# Patient Record
Sex: FEMALE | Race: AMERICAN INDIAN OR ALASKA NATIVE | NOT HISPANIC OR LATINO | Employment: OTHER | URBAN - METROPOLITAN AREA
[De-identification: names, ages, dates, MRNs, and addresses within clinical notes are randomized per-mention and may not be internally consistent; named-entity substitution may affect disease eponyms.]

---

## 2017-01-06 NOTE — PROGRESS NOTES
SUBJECTIVE:                                                    Kim Anne is a 71 year old female who presents to clinic today for the following health issues:    MTM: Crystal Barth, PharmD    Patient Description: Obese Native-American female with glasses.      Diabetes Follow-up  A1C goal: <8    On insulin.    A1C      6.3   12/13/2016  A1C      7.2   10/11/2016  A1C      9.1   8/29/2016  A1C      7.2   6/30/2016  A1C      6.8   12/9/2015  TSH   Date Value Ref Range Status   11/03/2016 1.28 0.40 - 4.00 mU/L Final   ]      Patient is checking blood sugars: Once in a while, 105 (fasting) today. In the past 2 weeks, reports lowest reading of 79 (asymptomatic, reports she rarely has low readings) and highest reading of 268 (after drinking soda).    Diabetic concerns: None     Symptoms of hypoglycemia (low blood sugar): none     Paresthesias (numbness or burning in feet) or sores: Yes, burning in the evening      Diabetic eye exam within the last year: Yes     Hyperlipidemia Follow-Up  LDL goal: <100    LDL CHOLESTEROL CALCULATED   Date Value Ref Range Status   12/20/2013 109 0 - 129 mg/dL Final     Comment:     LDL Cholesterol is the primary guide to therapy: LDL-cholesterol goal in high   risk patients is <100 mg/dL and in very high risk patients is <70 mg/dL.     LDL CHOLESTEROL DIRECT   Date Value Ref Range Status   08/29/2016 85 <100 mg/dL Final     Comment:     Desirable:       <100 mg/dl   ]      Rate your low fat/cholesterol diet?: good    Taking statin?  Yes, no muscle aches from statin    Other lipid medications/supplements?:  none     Hypertension Follow-up  BP goal: <130/80    BP Readings from Last 3 Encounters:   01/11/17 191/77   12/12/16 140/90   12/02/16 153/78     Last Basic Metabolic Panel:  NA      144   11/4/2016   POTASSIUM      4.5   11/4/2016  CHLORIDE      115   11/4/2016  FRANCES      7.9   11/4/2016  CO2       21   11/4/2016  BUN       37   11/4/2016  CR     2.09   11/4/2016  GLC       88    Visit Information Date & Time Provider Department Dept. Phone Encounter #  
 1/6/2017 10:15 AM Aracelis Redd MD [facility] [address] [place] [address] [id number] Follow-up Instructions Return in about 3 months (around 4/6/2017) for NP. Upcoming Health Maintenance Date Due HEMOGLOBIN A1C Q6M 1942 LIPID PANEL Q1 1942 FOOT EXAM Q1 12/30/1952 MICROALBUMIN Q1 12/30/1952 EYE EXAM RETINAL OR DILATED Q1 12/30/1952 DTaP/Tdap/Td series (1 - Tdap) 12/30/1963 FOBT Q 1 YEAR AGE 50-75 12/30/1992 ZOSTER VACCINE AGE 60> 12/30/2002 GLAUCOMA SCREENING Q2Y 12/30/2007 Pneumococcal 65+ Low/Medium Risk (1 of 2 - PCV13) 12/30/2007 MEDICARE YEARLY EXAM 12/30/2007 INFLUENZA AGE 9 TO ADULT 8/1/2016 Allergies as of 1/6/2017  Review Complete On: 1/6/2017 By: Jason Crawford LPN No Known Allergies Current Immunizations  Never Reviewed No immunizations on file. Not reviewed this visit You Were Diagnosed With   
  
 Codes Comments Bile duct stricture    -  Primary ICD-10-CM: K83.1 ICD-9-CM: 576.2 Intrahepatic bile duct carcinoma ([address]     ICD-10-CM: C22.1 ICD-9-CM: 155.1 Other abnormal tumor markers     ICD-10-CM: R97.8 ICD-9-CM: 795.89 Vitals BP Pulse Temp Height(growth percentile) Weight(growth percentile) SpO2  
 118/53 67 97.2 °F (36.2 °C) (Oral) 6' 2\" (1.88 m) 290 lb (131.5 kg) 93% BMI Smoking Status 37.23 kg/m2 Former Smoker BMI and BSA Data Body Mass Index Body Surface Area  
 37.23 kg/m 2 2.62 m 2 Preferred Pharmacy Pharmacy Name Phone [id number] [phone] Your Updated Medication List  
  
   
This list is accurate as of: 1/6/17 10:30 AM.  Always use your most recent med list.  
  
  
  
  
 ASPIRIN PO Take 81 mg by mouth daily. "11/4/2016      Outpatient blood pressures are being checked occasionally. She last checked BP at home 2 days ago, reports systolic BP of 198.    Low Salt Diet: no added salt       Chronic Kidney Disease Follow-up    Current NSAID use?   Ketorolac Tromethamine     Last seen by Nephrology 3/11/2016    GFR ESTIMATE   Date Value Ref Range Status   11/04/2016 23* >60 mL/min/1.7m2 Final     Comment:     Non  GFR Calc   11/03/2016 20* >60 mL/min/1.7m2 Final     Comment:     Non  GFR Calc   11/02/2016 17* >60 mL/min/1.7m2 Final     Comment:     Non  GFR Calc     GFR ESTIMATE IF BLACK   Date Value Ref Range Status   11/04/2016 28* >60 mL/min/1.7m2 Final     Comment:      GFR Calc   11/03/2016 24* >60 mL/min/1.7m2 Final     Comment:      GFR Calc   11/02/2016 20* >60 mL/min/1.7m2 Final     Comment:      GFR Calc       Depression Follow-up    Status since last visit:  Not a current problem     See PHQ-9 for current symptoms.  Other associated symptoms: None    Complicating factors:   Significant life event:  No   Current substance abuse:  None  Anxiety or Panic symptoms:  No    MENTAL STATUS EXAM  Appearance: Appears stated age, dressed and groomed appropriately for the visit today.  Attitude: cooperative  Behavior: normal  Eye Contact: normal  Speech: normal  Orientation: oriented to person, place, time and situation  Mood:  admits sadness and anxiety most of time  Affect: Mood Congruent  Thought Process: clear  Suicidal Ideation: reports no thoughts, no intention  Hallucination: no  Paranoid-no  Manic-no  Panic-no  Self harm-no  OCD-no  Gambling-no  Excessive shopping-no    Pain: 4-6/10, back and legs. No new MS complaints.    Sleep - \"Good\", wakes to urinate typically 3-4 times per night, after urinating she is able to go back to sleep without difficulty. Reports 6-8 hours total sleep per night.  Appetite - \"Good\", fasting " benazepril 10 mg tablet Commonly known as:  LOTENSIN Take 10 mg by mouth daily. bumetanide 1 mg tablet Commonly known as:  Rosie Bolus Take 2 mg by mouth two (2) times a day. CULTURELLE PROBIOTICS PO Take 1 Cap by mouth daily. * levothyroxine 200 mcg tablet Commonly known as:  SYNTHROID Take 400 mcg by mouth two (2) days a week. Takes 400 mcg Sat, Sun  
  
 * levothyroxine 200 mcg tablet Commonly known as:  SYNTHROID Take 200 mcg by mouth five (5) days a week. Daily Mon through Fri LORazepam 1 mg tablet Commonly known as:  ATIVAN Take 1 mg by mouth nightly.  
  
 magnesium oxide 400 mg Cap Take 400 mg by mouth daily. * NovoLOG Mix 70-30 FlexPen 100 unit/mL (70-30) Inpn Generic drug:  insulin aspart protamine/insulin aspart 60 Units by SubCUTAneous route Before breakfast and dinner. * NovoLOG Mix 70-30 FlexPen 100 unit/mL (70-30) Inpn Generic drug:  insulin aspart protamine/insulin aspart 10 Units by SubCUTAneous route nightly as needed (if BS >190 takes 10 units). potassium 99 mg tablet Take 99 mg by mouth daily. sucralfate 1 gram tablet Commonly known as:  Danney Ken Take 1 Tab by mouth three (3) times daily. VITAMIN B-12 1,000 mcg tablet Generic drug:  cyanocobalamin Take 1,000 mcg by mouth daily. VITAMIN D3 PO Take 10,000 Units by mouth daily. * Notice: This list has 4 medication(s) that are the same as other medications prescribed for you. Read the directions carefully, and ask your doctor or other care provider to review them with you. We Performed the Following CANCER AG 19-9 [[id number] Custom] CEA [id number] CPT(R)] HEPATIC FUNCTION PANEL [10281 CPT(R)] METABOLIC PANEL, COMPREHENSIVE [40170 CPT(R)] Follow-up Instructions Return in about 3 months (around 4/6/2017) for NP. Introducing [facility]! Dear Maria Feliciano: Thank you for requesting a [employer] account. Our records indicate that you already have an active [employer] account. You can access your account anytime at [email or web address] Did you know that you can access your hospital and ER discharge instructions at any time in [employer]? You can also review all of your test results from your hospital stay or ER visit. Additional Information If you have questions, please visit the Frequently Asked Questions section of the [employer] website at [email or web address] Remember, [employer] is NOT to be used for urgent needs. For medical emergencies, dial 911. Now available from your iPhone and Android! Please provide this summary of care documentation to your next provider. Your primary care clinician is listed as Azeem Sanchez. If you have any questions after today's visit, please call [phone]. today.  Exercise - 15 stairs in home     Recent falls - no  Recent blackouts - no  Seizures - no    Smoking - 2-3 cigarettes per day  Alcohol - None  Street drugs - None  Marijuana - None  Caffeine - Occasional soda    Any ER/UC or hospital visits in the last 2 weeks? - no      PHQ-9  English PHQ-9   Any Language             Social History   Substance Use Topics     Smoking status: Former Smoker -- 0.50 packs/day for 40 years     Types: Cigarettes     Quit date: 10/31/2016     Smokeless tobacco: Never Used     Alcohol Use: No        Problem list and histories reviewed & adjusted, as indicated.  Additional history: as documented    Patient Active Problem List   Diagnosis     Hyperlipidemia LDL goal <100     ARAUZ (dyspnea on exertion)     COPD (chronic obstructive pulmonary disease) (H)     Edema     Fatigue     History of MI (myocardial infarction)     Snores     Knee pain, left     Bunion of great toe of left foot     Dystrophic nail     Arthritis     Peripheral vascular disease (H)     Chronic low back pain     Health Care Home     CKD (chronic kidney disease) stage 4, GFR 15-29 ml/min (H)     Abnormal gait     Advance Care Planning     Vitamin D deficiency     Constipation     Tobacco use disorder     Hypertension goal BP (blood pressure) < 130/80     Bradycardia     Callus of foot     Other chronic pain     Cataracts, bilateral     Hyperopic astigmatism of both eyes     Presbyopia     Asymptomatic bunion, right     Acquired hypothyroidism     Type 2 diabetes mellitus with diabetic chronic kidney disease (H)     Hypertension associated with diabetes (H)     Hyperlipidemia associated with type 2 diabetes mellitus (H)     Obesity (BMI 30-39.9)     History of sick sinus syndrome     Nephrotic syndrome     Pneumonia     Grief     Past Surgical History   Procedure Laterality Date     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     Appendectomy       Cholecystectomy       Hysteroscopic placement contraceptive device        Elected term pregnancy       Subclavian stent  2010     [facility]     Ovary surgery       left for cyst benign     Colonoscopy  7/15/2011     polyps repeat in 5 years     Blepharoplasty bilateral  2013     Procedure: BLEPHAROPLASTY BILATERAL;  BILATERAL UPPER EYELID BLEPHAROPLASTY ;  Surgeon: Olayinka Lyon MD;  Location: [facility]       Social History   Substance Use Topics     Smoking status: Former Smoker -- 0.50 packs/day for 40 years     Types: Cigarettes     Quit date: 10/31/2016     Smokeless tobacco: Never Used     Alcohol Use: No     Family History   Problem Relation Age of Onset     DIABETES Mother      brother, MGM, sister     KIDNEY DISEASE Brother      X2 DM two      Asthma No family hx of      C.A.D. No family hx of      Hypertension No family hx of      CEREBROVASCULAR DISEASE No family hx of      Breast Cancer No family hx of      Cancer - colorectal No family hx of      Prostate Cancer No family hx of      Allergies No family hx of      Alzheimer Disease No family hx of      Anesthesia Reaction No family hx of      Arthritis No family hx of      Blood Disease No family hx of      CANCER No family hx of      Cardiovascular No family hx of      Circulatory No family hx of      Congenital Anomalies No family hx of      Connective Tissue Disorder No family hx of      Depression No family hx of      Eye Disorder No family hx of      Genetic Disorder No family hx of      GASTROINTESTINAL DISEASE No family hx of      Genitourinary Problems No family hx of      Gynecology No family hx of      HEART DISEASE No family hx of      Lipids No family hx of      Musculoskeletal Disorder No family hx of      Neurologic Disorder No family hx of      Obesity No family hx of      OSTEOPOROSIS No family hx of      Psychotic Disorder No family hx of      Respiratory No family hx of      Thyroid Disease No family hx of      Glaucoma No family hx of      Macular Degeneration No family hx of       Alcohol/Drug Child      daughter         Current Outpatient Prescriptions   Medication Sig Dispense Refill     oxyCODONE (ROXICODONE) 10 MG IR tablet Take 1 tablet (10 mg) by mouth every 8 hours as needed 90 tablet 0     glucose-vitamin C (DEX4 GLUCOSE) 4-0.006 G CHEW Take 1 tablet (4 g) by mouth every hour as needed for low blood sugar 50 tablet 3     insulin glargine (LANTUS) 100 UNIT/ML injection Inject 40 Units Subcutaneous every morning Pt reports down to 42 per MTM ON 12/08/2016 1 Month 3     losartan (COZAAR) 100 MG tablet Take 1 tablet (100 mg) by mouth daily 90 tablet 0     metoprolol (TOPROL-XL) 25 MG 24 hr tablet Take 1 tablet (25 mg) by mouth daily 90 tablet 1     albuterol (PROAIR HFA/PROVENTIL HFA/VENTOLIN HFA) 108 (90 BASE) MCG/ACT Inhaler Inhale 2 puffs into the lungs every 6 hours as needed for shortness of breath / dyspnea or wheezing 18 g 3     budesonide-formoterol (SYMBICORT) 80-4.5 MCG/ACT Inhaler Inhale 2 puffs into the lungs 2 times daily 1 Inhaler 1     nystatin (MYCOSTATIN) 339513 UNIT/GM POWD Apply 1 g topically 3 times daily as needed 30 g 1     amLODIPine (NORVASC) 10 MG tablet Take 1 tablet (10 mg) by mouth daily 30 tablet 11     furosemide (LASIX) 20 MG tablet Take 1 tablet (20 mg) by mouth daily 60 tablet 11     Cholecalciferol (VITAMIN D) 2000 UNITS tablet Take 2,000 Units by mouth daily 60 tablet 2     Alcohol Swabs (SM ALCOHOL PREP) 70 % PADS        ketorolac (ACULAR) 0.5 % ophthalmic solution        levothyroxine (SYNTHROID, LEVOTHROID) 200 MCG tablet Take 1 tablet (200 mcg) by mouth See Admin Instructions New daily increase. Recheck labs in 8 weeks. 90 tablet 1     docusate sodium (COLACE) 100 MG capsule Take 1 capsule (100 mg) by mouth 2 times daily 60 capsule 4     insulin pen needle 31G X 6 MM Use as directed 120 each 3     ASPIRIN LOW DOSE 81 MG EC tablet Take 1 tablet (81 mg) by mouth daily 30 tablet 11     Isopropyl Alcohol ([facility] ISOPROPYL ALCOHOL WIPES) 70 % MISC Externally  apply 1 pad topically 3 times daily 100 each 11     Propylene Glycol (SYSTANE BALANCE OP) Apply to eye as needed       order for DME Disp Refills Start End AMADEO     ORDER FOR DME, SET TO LOCAL PRINT, 1 Device 0   --    Sig: Equipment being ordered: DM shoes and inserts   Bunion, callus    Class: Local Print         Medication Administration Instructions     Equipment being ordered: DM shoes and inserts  Bunion, callus 1 Device 0     atorvastatin (LIPITOR) 40 MG tablet Take 1 tablet (40 mg) by mouth daily 90 tablet 1     ferrous sulfate (IRON) 325 (65 FE) MG tablet Take 1 tablet (325 mg) by mouth daily (with breakfast) 100 tablet 1     blood glucose monitoring (FREESTYLE) lancets Test BS four times daily as directed 1 Box 12     order for DME Equipment being ordered: 2 pairs of moderate support knee high support hose 2 Device 0     blood glucose monitoring (NO BRAND SPECIFIED) test strip 1 strip by In Vitro route 2 times daily Freestyle Lite 2 Box 12     order for DME Equipment being ordered:  4 wheeled walker with basket, seat and hand brakes 1 Device 0     order for DME Equipment being ordered: one automated BP machine and large cuff 1 Device 0     order for DME Equipment being ordered: 2 pair moderate level knee high support hose 2 Device 1     order for DME Equipment being ordered: One wheeled walker with seat, brakes and basket  To replace lost walker 1 Device 0     polyethylene glycol (MIRALAX) powder Take 17 g by mouth daily 510 g 2     nitroglycerin (NITROSTAT) 0.4 MG SL tablet Place 1 tablet (0.4 mg) under the tongue every 5 minutes as needed for chest pain 25 tablet 0     ORDER FOR DME, SET TO LOCAL PRINT, Equipment being ordered: DM shoes and inserts  Bunion, callus 1 Device 0     Emollient (EUCERIN CALMING DAILY MOIST) CREA Externally apply 1 dose * topically daily 1 Tube 12       Medications Reviewed with Patient:     Patient is a poor historian as it pertains to her medication compliance.    - Lantus:  "Reports she is now taking in evening rather than AM.  - Metoprolol, Amlodipine, and Losartan: Last took 2 days ago.  - Oxycodone: Last took last night.   - Nitroglycerin: States she has not had to take since previous visits.  - Lasix: Reports she is taking as prescribed.  - Levothyroxine: Reports she is taking as prescribed.  - Baby aspirin: Reports she is taking as prescribed.  - Atorvastatin: Reports she is taking as prescribed.  - Iron supplement: Reports she is taking as prescribed.  - Albuterol and Symbicort inhalers: Reports she is taking as prescribed, but using the \"blue one\" TID.    Discussed medication non-compliance. Reports she sets up her medications in a pill box weekly, but often does not take her medications because she has been \"too busy\".       [place] Prescription Drug Monitoring Program: Completed by provider 1/10/2017    Oxycodone (ROXICODONE) 10 mg tablet: On 12/9/2016, patient filled #90 (30 day supply)      Allergies   Allergen Reactions     Contrast Dye Hives and Itching     Clonidine      She had as IP and thinks it made her itchy     Diatrizoate Other (See Comments)     Diltiazem      Severe bradycardia     Hydralazine      [place] tab patient thought made her itchy so stopped     Iodine-131      Recent Labs   Lab Test  12/13/16   1036  11/04/16   0621  11/03/16   0545   11/02/16   0622  11/01/16   2335  10/11/16   0842  08/29/16   1652   12/09/15   0946   03/04/15   0944   12/20/13   1022   10/19/11   1111  08/19/11   0945   A1C  6.3*   --    --    --    --    --   7.2*  9.1*   < >  6.8*   < >  6.9*   < >  6.7*   < >   --   6.9*   LDL   --    --    --    --    --    --    --   85   --   72   --   99   < >  109   < >  85  109   HDL   --    --    --    --    --    --    --    --    --    --    --    --    --   43*   --   49*  49*   TRIG   --    --    --    --    --    --    --    --    --    --    --    --    --   331*   --   175*  254*   ALT   --    --    --    --   21 27   --   23   --   27   " "< >   --    < >   --    < >  14   --    CR   --   2.09*  2.39*   < >  2.64*  2.53*  2.24*  2.17*   < >  1.75*   < >  1.59*   < >   --    < >   --   1.20*   GFRESTIMATED   --   23*  20*   < >  18*  19*  22*  22*   < >  29*   < >  32*   < >   --    < >   --   45*   GFRESTBLACK   --   28*  24*   < >  22*  23*  26*  27*   < >  35*   < >  39*   < >   --    < >   --   55*   POTASSIUM   --   4.5  4.5   < >  5.6*  5.3  5.5*  5.2   < >  4.9   < >  5.1   < >   --    < >   --   4.2   TSH   --    --   1.28   --    --    --   17.25*  13.11*   --   1.24   < >   --    < >   --    < >  6.82*  36.00*    < > = values in this interval not displayed.      BP Readings from Last 3 Encounters:   01/11/17 191/77   12/12/16 140/90   12/02/16 153/78    Wt Readings from Last 3 Encounters:   01/11/17 196 lb 8 oz (89.132 kg)   12/08/16 197 lb 8 oz (89.585 kg)   12/02/16 191 lb (86.637 kg)            Labs reviewed in EPIC  Problem list, Medication list, Allergies, and Medical/Social/Surgical histories reviewed in [facility] and updated as appropriate.    ROS: Ankle swelling, worse at end of the day, wears support hose and tries to limit salt intake.  10 point ROS neg other than the symptoms noted above in the HPI.      This document serves as a record of the services and decisions personally performed and made by Mai Babcock MD. It was created on her behalf by Lou Pedro, a trained medical scribe. The creation of this document is based on the provider's statements to the medical scribe.  Lou Pedro 9:24 AM January 11, 2017    OBJECTIVE:                                                    /77 mmHg  Pulse 69  Temp(Src) 98.7  F (37.1  C) (Oral)  Resp 16  Ht 5' 5\" (1.651 m)  Wt 196 lb 8 oz (89.132 kg)  BMI 32.70 kg/m2  SpO2 96%  Body mass index is 32.7 kg/(m^2).     GENERAL:  Native-American obese female, healthy, alert and no distress  EYES: Eyes grossly normal to inspection, extraocular movements - intact, and PERRL  HENT: ear " canals- normal; TMs- normal; Nose- normal; Mouth- no ulcers, no lesions  NECK: no tenderness, no adenopathy, no asymmetry, no masses, no stiffness; thyroid- normal to palpation  RESP: lungs clear to auscultation - no rales, no rhonchi, no wheezes  CV: regular rates and rhythm, normal S1 S2, no S3 or S4 and no murmur, no click or rub -no edema no casimiro/cords  ABDOMEN: soft, no tenderness, no  hepatosplenomegaly, no masses, normal bowel sounds  MS: extremities- no gross deformities noted, trace edema of lower extremities bilaterally-symmetrical  SKIN: no suspicious lesions, no rashes  NEURO: strength and tone- normal, sensory exam- grossly normal, mentation- intact, speech- normal, reflexes- symmetric  Non focal no aphasia. No facial asymmetry. Finger to nose, rapid alteration, finger thumb opposition, heel knee shin are normal.  Diabetic foot exam: normal DP and PT pulses, left bunion, no trophic changes or ulcerative lesions and normal sensory exam, monofilament   BACK: no CVA tenderness, no paralumbar tenderness  PSYCH: Alert and oriented times 3; speech- coherent , normal rate and volume; able to articulate logical thoughts, able to abstract reason, no tangential thoughts, no hallucinations or delusions, affect- normal; poor historian in relationship to medication compliance  See [facility] notes   LYMPHATICS: ant. cervical- normal, post. cervical- normal, axillary- normal, supraclavicular- normal, inguinal- normal    Results for orders placed or performed in visit on 01/11/17   Drug abuse screen (NL, RW)   Result Value Ref Range    Methamphetamine Qual Urine  NEG     Negative   Cutoff for a negative methamphetamine is 1000 ng/mL or less.      Cocaine Qual Urine  NEG     Negative   Cutoff for a negative cocaine is 300 ng/mL or less.      Cannabinoids Qual Urine  NEG     Negative   Cutoff for a negative cannabinoid is 50 ng/mL or less.      MDMA Qual Urine  NEG     Negative   Cutoff for a negative MDMA (ecstasy) is 500  ng/mL or less.      Methadone Qual Urine  NEG     Negative   Cutoff for a negative methadone is 300 ng/mL or less.      Opiates Qualitative Urine  NEG     Negative   Cutoff for a negative opiate is 300 ng/mL or less.      Benzodiazepine Qual Urine  NEG     Negative   Cutoff for a negative benzodiazepine is 300 ng/mL or less.      Tricyc Anti Qual Urine  NEG     Negative   Cutoff for a negative tricyclic antidepressant is 1000 ng/mL or less.      Barbiturates Qual Urine  NEG     Negative   Cutoff for a negative barbituate is 300 ng/mL or less.      PCP Qual Urine  NEG     Negative   Cutoff for a negative PCP is 25 ng/mL or less.      Amphetamine Qual Urine  NEG     Negative   Cutoff for a negative amphetamine is 1000 ng/mL or less.      Oxycodone Qual Urine (A) NEG     Positive   Cutoff for a positive Oxycodone is greater than 100 ng/mL. This is an   unconfirmed screening result to be used for medical purposes only.                ASSESSMENT/PLAN:                                                      (E11.22,  N18.4,  Z79.4) Type 2 diabetes mellitus with stage 4 chronic kidney disease, with long-term current use of insulin (H)  (primary encounter diagnosis)  Comment: A1c has been recently controlled.  A1C      6.3   12/13/2016  A1C      7.2   10/11/2016  A1C      9.1   8/29/2016  A1C      7.2   6/30/2016  A1C      6.8   12/9/2015  Plan: Continue current treatment plan. Nephrology appointment 1/27/2017.    (M54.5,  G89.29) Chronic low back pain without sciatica, unspecified back pain laterality  Comment: Negative drug abuse screen. Refill.  Plan: Drug abuse screen (NL, RW), oxyCODONE         (ROXICODONE) 10 MG IR tablet    (Z79.899) Controlled substance agreement signed  Comment: Signed today.  Plan: CSA will be on file.    (E78.5) Hyperlipidemia LDL goal <100  Comment:   LDL CHOLESTEROL CALCULATED   Date Value Ref Range Status   12/20/2013 109 0 - 129 mg/dL Final     Comment:     LDL Cholesterol is the primary guide  to therapy: LDL-cholesterol goal in high   risk patients is <100 mg/dL and in very high risk patients is <70 mg/dL.     LDL CHOLESTEROL DIRECT   Date Value Ref Range Status   08/29/2016 85 <100 mg/dL Final     Comment:     Desirable:       <100 mg/dl     Plan: Continue current treatment plan.    (I10) Hypertension goal BP (blood pressure) < 130/80  Comment: Elevated secondary to medication noncompliance. Discussed importance of compliance to lower risk for stroke. Will check renal function.  BP Readings from Last 6 Encounters:   01/11/17 160/68   12/12/16 140/90   12/02/16 153/78   11/11/16 198/80   11/04/16 119/62   10/11/16 134/77     Plan: Renal panel (Alb, BUN, Ca, Cl, CO2, Creat,         Gluc, Phos, K, Na)    (K59.00) Constipation, unspecified constipation type  Comment: Managed with medication. Refill.  Plan: docusate sodium (COLACE) 100 MG capsule    (N18.4) CKD (chronic kidney disease) stage 4, GFR 15-29 ml/min (H)  Comment:   GFR ESTIMATE   Date Value Ref Range Status   11/04/2016 23* >60 mL/min/1.7m2 Final     Comment:     Non  GFR Calc   11/03/2016 20* >60 mL/min/1.7m2 Final     Comment:     Non  GFR Calc   11/02/2016 17* >60 mL/min/1.7m2 Final     Comment:     Non  GFR Calc     GFR ESTIMATE IF BLACK   Date Value Ref Range Status   11/04/2016 28* >60 mL/min/1.7m2 Final     Comment:      GFR Calc   11/03/2016 24* >60 mL/min/1.7m2 Final     Comment:      GFR Calc   11/02/2016 20* >60 mL/min/1.7m2 Final     Comment:      GFR Calc     Plan: Renal panel (Alb, BUN, Ca, Cl, CO2, Creat,         Gluc, Phos, K, Na) - Appointment with Nephrology 1/27/2017.    (J44.9) Chronic obstructive pulmonary disease, unspecified COPD type (H)  Comment: Oxygen saturation stable.  SpO2 Readings from Last 12 Encounters:   01/11/17 96%   12/08/16 97%   12/02/16 97%   11/11/16 97%   11/04/16 97%   10/11/16 97%   08/29/16 98%   08/10/16  "97%   06/30/16 96%   06/02/16 96%   05/26/16 97%   03/21/16 96%     Plan: Continue current treatment plan.    (D50.9) Iron deficiency anemia, unspecified iron deficiency anemia type  Comment: On iron supplementation. Labs today.  Plan: CBC with platelets, Iron and iron binding         capacity, Ferritin    (Z91.14) Noncompliance with medication regimen  Comment: Patient reports difficulty taking medications secondary to being \"too busy\". Discussed importance of medication compliance.  Plan: See patient instructions. Patient is to bring all medications in with her at next visit.    (F17.200) Tobacco use disorder  Comment: 2-3 cigarettes per day.  Plan: Encouraged cessation.        Patient Instructions   - Labs today.    - Remember to take your blood pressure medications (amlodipine, losartan, metoprolol, lasix) every morning, this will help reduce your risk for stroke.    - Make appointment with Crystal Barth PharmD for next week.    - Make appointment with Dr. Babcock in 1 month.    - Hard copy of script for oxycodone will be given to patient if tox screen is OK.     - Patient signed controlled substance agreement today.            The information in this document, created by a scribe for me, accurately reflects the services I personally performed and the decisions made by me. I have reviewed and approved this document for accuracy.  12:08 PM January 11, 2017    Mai Babcock MD  [facility]    40 min spent in direct face to face time with this pt discussing medication compliance, uncontrolled hypertension, CKD stage IV, diabetes, mental health, and others described above, greater than 50% in counseling and coordination of care.      "

## 2017-01-09 ENCOUNTER — CARE COORDINATION (OUTPATIENT)
Dept: GERIATRIC MEDICINE | Facility: CLINIC | Age: 72
End: 2017-01-09

## 2017-01-09 NOTE — PROGRESS NOTES
Per member, arranged transportation thru [facility] for the below appt:  Appt Date: 1/11 at 9:30am  Clinic Name:  [facility], [address], [place]  Transportation Provider: [employer]   time:  8:30-9am    Notified member of  time.  Shakila Fountain  Case Management Specialist  [facility]  [phone]

## 2017-01-11 ENCOUNTER — OFFICE VISIT (OUTPATIENT)
Dept: FAMILY MEDICINE | Facility: CLINIC | Age: 72
End: 2017-01-11
Payer: MEDICARE

## 2017-01-11 ENCOUNTER — OFFICE VISIT (OUTPATIENT)
Dept: PHARMACY | Facility: CLINIC | Age: 72
End: 2017-01-11
Payer: COMMERCIAL

## 2017-01-11 VITALS
RESPIRATION RATE: 16 BRPM | OXYGEN SATURATION: 96 % | SYSTOLIC BLOOD PRESSURE: 160 MMHG | WEIGHT: 196.5 LBS | HEART RATE: 69 BPM | DIASTOLIC BLOOD PRESSURE: 68 MMHG | HEIGHT: 65 IN | TEMPERATURE: 98.7 F | BODY MASS INDEX: 32.74 KG/M2

## 2017-01-11 DIAGNOSIS — N18.4 CKD (CHRONIC KIDNEY DISEASE) STAGE 4, GFR 15-29 ML/MIN (H): ICD-10-CM

## 2017-01-11 DIAGNOSIS — Z79.899 CONTROLLED SUBSTANCE AGREEMENT SIGNED: ICD-10-CM

## 2017-01-11 DIAGNOSIS — J44.9 CHRONIC OBSTRUCTIVE PULMONARY DISEASE, UNSPECIFIED COPD TYPE (H): ICD-10-CM

## 2017-01-11 DIAGNOSIS — F17.200 TOBACCO USE DISORDER: ICD-10-CM

## 2017-01-11 DIAGNOSIS — E11.22 TYPE 2 DIABETES MELLITUS WITH STAGE 4 CHRONIC KIDNEY DISEASE, WITH LONG-TERM CURRENT USE OF INSULIN (H): Primary | ICD-10-CM

## 2017-01-11 DIAGNOSIS — I10 HYPERTENSION GOAL BP (BLOOD PRESSURE) < 130/80: ICD-10-CM

## 2017-01-11 DIAGNOSIS — N18.4 TYPE 2 DIABETES MELLITUS WITH STAGE 4 CHRONIC KIDNEY DISEASE, WITH LONG-TERM CURRENT USE OF INSULIN (H): Primary | ICD-10-CM

## 2017-01-11 DIAGNOSIS — Z91.148 NONCOMPLIANCE WITH MEDICATION REGIMEN: ICD-10-CM

## 2017-01-11 DIAGNOSIS — I15.2 HYPERTENSION ASSOCIATED WITH DIABETES (H): Primary | ICD-10-CM

## 2017-01-11 DIAGNOSIS — K59.00 CONSTIPATION, UNSPECIFIED CONSTIPATION TYPE: ICD-10-CM

## 2017-01-11 DIAGNOSIS — Z79.4 TYPE 2 DIABETES MELLITUS WITH STAGE 4 CHRONIC KIDNEY DISEASE, WITH LONG-TERM CURRENT USE OF INSULIN (H): Primary | ICD-10-CM

## 2017-01-11 DIAGNOSIS — M54.50 CHRONIC LOW BACK PAIN WITHOUT SCIATICA, UNSPECIFIED BACK PAIN LATERALITY: ICD-10-CM

## 2017-01-11 DIAGNOSIS — E78.5 HYPERLIPIDEMIA LDL GOAL <100: ICD-10-CM

## 2017-01-11 DIAGNOSIS — D50.9 IRON DEFICIENCY ANEMIA, UNSPECIFIED IRON DEFICIENCY ANEMIA TYPE: ICD-10-CM

## 2017-01-11 DIAGNOSIS — G89.29 CHRONIC LOW BACK PAIN WITHOUT SCIATICA, UNSPECIFIED BACK PAIN LATERALITY: ICD-10-CM

## 2017-01-11 DIAGNOSIS — E11.59 HYPERTENSION ASSOCIATED WITH DIABETES (H): Primary | ICD-10-CM

## 2017-01-11 LAB
ALBUMIN SERPL-MCNC: 1.9 G/DL (ref 3.4–5)
AMPHETAMINES UR QL: ABNORMAL
ANION GAP SERPL CALCULATED.3IONS-SCNC: 9 MMOL/L (ref 3–14)
BARBITURATES UR QL: ABNORMAL
BENZODIAZ UR QL: ABNORMAL
BUN SERPL-MCNC: 22 MG/DL (ref 7–30)
CALCIUM SERPL-MCNC: 8.5 MG/DL (ref 8.5–10.1)
CANNABINOIDS UR QL: ABNORMAL
CHLORIDE SERPL-SCNC: 116 MMOL/L (ref 94–109)
CO2 SERPL-SCNC: 20 MMOL/L (ref 20–32)
COCAINE UR QL: ABNORMAL
CREAT SERPL-MCNC: 2.2 MG/DL (ref 0.52–1.04)
ERYTHROCYTE [DISTWIDTH] IN BLOOD BY AUTOMATED COUNT: 16.6 % (ref 10–15)
FERRITIN SERPL-MCNC: 105 NG/ML (ref 8–252)
GFR SERPL CREATININE-BSD FRML MDRD: 22 ML/MIN/1.7M2
GLUCOSE SERPL-MCNC: 145 MG/DL (ref 70–99)
HCT VFR BLD AUTO: 41.8 % (ref 35–47)
HGB BLD-MCNC: 13.2 G/DL (ref 11.7–15.7)
IRON SATN MFR SERPL: 18 % (ref 15–46)
IRON SERPL-MCNC: 35 UG/DL (ref 35–180)
MCH RBC QN AUTO: 29.4 PG (ref 26.5–33)
MCHC RBC AUTO-ENTMCNC: 31.6 G/DL (ref 31.5–36.5)
MCV RBC AUTO: 93 FL (ref 78–100)
MDA UR QL SCN: ABNORMAL
METHADONE UR QL SCN: ABNORMAL
METHAMPHET UR QL SCN: ABNORMAL
OPIATES UR QL SCN: ABNORMAL
OXYCODONE UR QL: ABNORMAL
PCP UR QL SCN: ABNORMAL
PHOSPHATE SERPL-MCNC: 3.8 MG/DL (ref 2.5–4.5)
PLATELET # BLD AUTO: 266 10E9/L (ref 150–450)
POTASSIUM SERPL-SCNC: 5.2 MMOL/L (ref 3.4–5.3)
RBC # BLD AUTO: 4.49 10E12/L (ref 3.8–5.2)
SODIUM SERPL-SCNC: 145 MMOL/L (ref 133–144)
TIBC SERPL-MCNC: 193 UG/DL (ref 240–430)
TRICYCLICS UR QL SCN: ABNORMAL
WBC # BLD AUTO: 7.4 10E9/L (ref 4–11)

## 2017-01-11 PROCEDURE — 99607 MTMS BY PHARM ADDL 15 MIN: CPT | Mod: GY | Performed by: PHARMACIST

## 2017-01-11 PROCEDURE — 85027 COMPLETE CBC AUTOMATED: CPT | Performed by: FAMILY MEDICINE

## 2017-01-11 PROCEDURE — 99605 MTMS BY PHARM NP 15 MIN: CPT | Mod: GY | Performed by: PHARMACIST

## 2017-01-11 PROCEDURE — 82728 ASSAY OF FERRITIN: CPT | Performed by: FAMILY MEDICINE

## 2017-01-11 PROCEDURE — 36415 COLL VENOUS BLD VENIPUNCTURE: CPT | Performed by: FAMILY MEDICINE

## 2017-01-11 PROCEDURE — 83550 IRON BINDING TEST: CPT | Performed by: FAMILY MEDICINE

## 2017-01-11 PROCEDURE — 80069 RENAL FUNCTION PANEL: CPT | Performed by: FAMILY MEDICINE

## 2017-01-11 PROCEDURE — 80305 DRUG TEST PRSMV DIR OPT OBS: CPT | Performed by: FAMILY MEDICINE

## 2017-01-11 PROCEDURE — 83540 ASSAY OF IRON: CPT | Performed by: FAMILY MEDICINE

## 2017-01-11 PROCEDURE — 99215 OFFICE O/P EST HI 40 MIN: CPT | Performed by: FAMILY MEDICINE

## 2017-01-11 RX ORDER — OXYCODONE HYDROCHLORIDE 10 MG/1
10 TABLET ORAL EVERY 8 HOURS PRN
Qty: 90 TABLET | Refills: 0 | Status: SHIPPED | OUTPATIENT
Start: 2017-01-11 | End: 2017-02-13

## 2017-01-11 RX ORDER — DOCUSATE SODIUM 100 MG/1
100 CAPSULE, LIQUID FILLED ORAL 2 TIMES DAILY
Qty: 60 CAPSULE | Refills: 4 | Status: SHIPPED | OUTPATIENT
Start: 2017-01-11 | End: 2017-11-30

## 2017-01-11 ASSESSMENT — PAIN SCALES - GENERAL: PAINLEVEL: SEVERE PAIN (6)

## 2017-01-11 NOTE — Clinical Note
My COPD Action Plan   Name: Kim Anne    YOB: 1945    Date: 1/10/2017    My doctor: Mai Babcock    My clinic: [facility] [address]  [address]  [facility] [id number] [phone]   My COPD Medicine:       My Controller Medications: Albuterol (Proair/Ventolin/Proventolin)  Formoterol/Budesonide (Symbicort)     My Rescue Medication:  Albuterol     Use of Oxygen:  Oxygen Not Prescribed   My COPD Severity: Mild = FeV1 > 80%        GREEN ZONE       Doing well today      Usual level of activity and exercise    Usual amount of cough and mucus    No shortness of breath    Can think clearly    Sleep well at night    Feel like eating Actions:      Take daily medicines    Use oxygen as prescribed    Follow regular exercise and diet plan    Avoid cigarette smoke and other irritants that harm the lungs             YELLOW ZONE          Having a bad day or flare up      Short of breath more than usual    Change in color or amount of mucus    More coughing or wheezing    Fever or chills    Less energy; trouble completing activities    Trouble thinking or focusing    Using quick relief inhaler or nebulizer more often    Poor sleep; symptoms wake me up    Do not feel like eating Actions:      Take daily medicines    Use quick relief inhaler every 4 hours    If you use oxygen, call you doctor to see if you should adjust your oxygen    Do breathing exercises or other things to help you relax    Let a loved one, friend or neighbor know you are feeling worse    If you have one or more symptoms, consider taking steroids or antibiotics (if they were prescribed)    Call your care team if you have 2 or more symptoms or symptoms don't improve           RED ZONE       Need medical care now      Severe shortness of breath (feel you can't breath)    Not enough breath to do any activity    Trouble walking or talking    Trouble coughing up mucus or blood in  mucus    Frequent coughing    Rescue medicines are not working    Not able to sleep because of breathing    Feel confused or drowsy    Chest pain  Actions:      Call 911 or     Have someone take you to the emergency room if you can't reach your care team        Electronically signed by: Mai Babcock, January 10, 2017     Annual Reminders:  Meet with Care Team, Flu Shot every Fall and Pneumonia Shot at least once.       TIFFANI SERRANO [place], [place] - [address]  [place], [place] - [address]

## 2017-01-11 NOTE — Clinical Note
[facility]  [facility], [address]   [address]  Telephone:  [phone]            January 12, 2017      Kim Anne  [address]  [facility] [address]              Dear Kim,    Results for orders placed or performed in visit on 01/11/17   Drug abuse screen (NL, RW)   Result Value Ref Range    Methamphetamine Qual Urine  NEG     Negative   Cutoff for a negative methamphetamine is 1000 ng/mL or less.      Cocaine Qual Urine  NEG     Negative   Cutoff for a negative cocaine is 300 ng/mL or less.      Cannabinoids Qual Urine  NEG     Negative   Cutoff for a negative cannabinoid is 50 ng/mL or less.      MDMA Qual Urine  NEG     Negative   Cutoff for a negative MDMA (ecstasy) is 500 ng/mL or less.      Methadone Qual Urine  NEG     Negative   Cutoff for a negative methadone is 300 ng/mL or less.      Opiates Qualitative Urine  NEG     Negative   Cutoff for a negative opiate is 300 ng/mL or less.      Benzodiazepine Qual Urine  NEG     Negative   Cutoff for a negative benzodiazepine is 300 ng/mL or less.      Tricyc Anti Qual Urine  NEG     Negative   Cutoff for a negative tricyclic antidepressant is 1000 ng/mL or less.      Barbiturates Qual Urine  NEG     Negative   Cutoff for a negative barbituate is 300 ng/mL or less.      PCP Qual Urine  NEG     Negative   Cutoff for a negative PCP is 25 ng/mL or less.      Amphetamine Qual Urine  NEG     Negative   Cutoff for a negative amphetamine is 1000 ng/mL or less.      Oxycodone Qual Urine (A) NEG     Positive   Cutoff for a positive Oxycodone is greater than 100 ng/mL. This is an   unconfirmed screening result to be used for medical purposes only.     Renal panel (Alb, BUN, Ca, Cl, CO2, Creat, Gluc, Phos, K, Na)   Result Value Ref Range    Sodium 145 (H) 133 - 144 mmol/L    Potassium 5.2 3.4 - 5.3 mmol/L    Chloride 116 (H) 94 - 109 mmol/L    Carbon Dioxide 20 20 - 32 mmol/L    Anion Gap 9 3 - 14  mmol/L    Glucose 145 (H) 70 - 99 mg/dL    Urea Nitrogen 22 7 - 30 mg/dL    Creatinine 2.20 (H) 0.52 - 1.04 mg/dL    GFR Estimate 22 (L) >60 mL/min/1.7m2    GFR Estimate If Black 27 (L) >60 mL/min/1.7m2    Calcium 8.5 8.5 - 10.1 mg/dL    Phosphorus 3.8 2.5 - 4.5 mg/dL    Albumin 1.9 (L) 3.4 - 5.0 g/dL   CBC with platelets   Result Value Ref Range    WBC 7.4 4.0 - 11.0 10e9/L    RBC Count 4.49 3.8 - 5.2 10e12/L    Hemoglobin 13.2 11.7 - 15.7 g/dL    Hematocrit 41.8 35.0 - 47.0 %    MCV 93 78 - 100 fl    MCH 29.4 26.5 - 33.0 pg    MCHC 31.6 31.5 - 36.5 g/dL    RDW 16.6 (H) 10.0 - 15.0 %    Platelet Count 266 150 - 450 10e9/L   Iron and iron binding capacity   Result Value Ref Range    Iron 35 35 - 180 ug/dL    Iron Binding Cap 193 (L) 240 - 430 ug/dL    Iron Saturation Index 18 15 - 46 %   Ferritin   Result Value Ref Range    Ferritin 105 8 - 252 ng/mL         GFR ESTIMATE   Date Value Ref Range Status   01/11/2017 22* >60 mL/min/1.7m2 Final     Comment:     Non  GFR Calc   11/04/2016 23* >60 mL/min/1.7m2 Final     Comment:     Non  GFR Calc   11/03/2016 20* >60 mL/min/1.7m2 Final     Comment:     Non  GFR Calc     Kidney stress as before.    Sugar and sodium are slightly up.   Iron is ok.  Nutrition lab albumin is low.      Sincerely,      Mai Babcock MD, MEd

## 2017-01-11 NOTE — PATIENT INSTRUCTIONS
- Labs today.    - Remember to take your blood pressure medications (amlodipine, losartan, metoprolol, lasix) every morning, this will help reduce your risk for stroke.    - Make appointment with Crystal Barth PharmD for next week.    - Make appointment with Dr. Babcock in 1 month.    - Hard copy of script for oxycodone will be given to patient if tox screen is OK.     - Patient signed controlled substance agreement today.

## 2017-01-11 NOTE — MR AVS SNAPSHOT
After Visit Summary   1/11/2017    Kim Anne    MRN: [id number]           Patient Information     Date Of Birth          1945        Visit Information        Provider Department      1/11/2017 9:30 AM Mai Babcock MD [facility]        Today's Diagnoses     Type 2 diabetes mellitus with stage 4 chronic kidney disease, with long-term current use of insulin (H)    -  1     Chronic low back pain without sciatica, unspecified back pain laterality         Controlled substance agreement signed         Hyperlipidemia LDL goal <100         Hypertension goal BP (blood pressure) < 130/80         Constipation, unspecified constipation type         CKD (chronic kidney disease) stage 4, GFR 15-29 ml/min (H)         Chronic obstructive pulmonary disease, unspecified COPD type (H)         Iron deficiency anemia, unspecified iron deficiency anemia type           Care Instructions    - Labs today.    - Remember to take your blood pressure medications (amlodipine, losartan, metoprolol, lasix) every morning, this will help reduce your risk for stroke.    - Make appointment with Crystal Barth PharmD for next week.    - Make appointment with Dr. Babcock in 1 month.    - Hard copy of script for oxycodone will be given to patient if tox screen is OK.     - Patient signed controlled substance agreement today.          Follow-ups after your visit        Your next 10 appointments already scheduled     Jan 27, 2017  9:15 AM   Lab with  LAB   [facility] ([facility])    [address] [place] [id number] [phone]            Jan 27, 2017 10:10 AM   (Arrive by 9:40 AM)   Return Visit with Bhargav Lopes MD   [facility] ([facility])    [address] [place] [id number] [phone]            Feb 23, 2017  1:00 PM   (Arrive by 12:45 PM)   Return Visit with Emily  "Virginia Cordova MD   [facility] ([facility])    [address] [facility]  [address] [facility] [address] [phone]              Who to contact     If you have questions or need follow up information about today's clinic visit or your schedule please contact [facility] directly at [phone].  Normal or non-critical lab and imaging results will be communicated to you by MyChart, letter or phone within 4 business days after the clinic has received the results. If you do not hear from us within 7 days, please contact the clinic through [employer] or phone. If you have a critical or abnormal lab result, we will notify you by phone as soon as possible.  Submit refill requests through [employer] or call your pharmacy and they will forward the refill request to us. Please allow 3 business days for your refill to be completed.          Additional Information About Your Visit        [employer] Information     [employer] lets you send messages to your doctor, view your test results, renew your prescriptions, schedule appointments and more. To sign up, go to [email or web address] . Click on \"Log in\" on the left side of the screen, which will take you to the Welcome page. Then click on \"Sign up Now\" on the right side of the page.     You will be asked to enter the access code listed below, as well as some personal information. Please follow the directions to create your username and password.     Your access code is: [email or web address]  Expires: 2017 10:10 AM     Your access code will  in 90 days. If you need help or a new code, please call your [facility] or [phone].        Care EveryWhere ID     This is your Care EveryWhere ID. This could be used by other organizations to access your [place] medical records  [id number]        Your Vitals Were     Pulse Temperature Respirations Height BMI (Body Mass Index) Pulse Oximetry    69 " "98.7  F (37.1  C) (Oral) 16 5' 5\" (1.651 m) 32.70 kg/m2 96%       Blood Pressure from Last 3 Encounters:   01/11/17 160/68   12/12/16 140/90   12/02/16 153/78    Weight from Last 3 Encounters:   01/11/17 196 lb 8 oz (89.132 kg)   12/08/16 197 lb 8 oz (89.585 kg)   12/02/16 191 lb (86.637 kg)              We Performed the Following     CBC with platelets     Drug abuse screen (NL, RW)     Ferritin     Iron and iron binding capacity     Renal panel (Alb, BUN, Ca, Cl, CO2, Creat, Gluc, Phos, K, Na)          Where to get your medicines      These medications were sent to TIFFANI SERRANO [facility] [place], [place] - [address]  [address] [facility] [address], [facility] [address]     Phone:  [phone]    - docusate sodium 100 MG capsule      Some of these will need a paper prescription and others can be bought over the counter.  Ask your nurse if you have questions.     Bring a paper prescription for each of these medications    - oxyCODONE 10 MG IR tablet       Primary Care Provider Office Phone # Fax #    Mai Babcock [phone]       [facility] [address]  [facility] [address]        Thank you!     Thank you for choosing [facility]  for your care. Our goal is always to provide you with excellent care. Hearing back from our patients is one way we can continue to improve our services. Please take a few minutes to complete the written survey that you may receive in the mail after your visit with us. Thank you!             Your Updated Medication List - Protect others around you: Learn how to safely use, store and throw away your medicines at www.disposemymeds.org.          This list is accurate as of: 1/11/17  9:41 AM.  Always use your most recent med list.                   Brand Name Dispense Instructions for use    albuterol 108 (90 BASE) MCG/ACT Inhaler    PROAIR HFA/PROVENTIL HFA/VENTOLIN HFA    18 g    Inhale 2 puffs into " the lungs every 6 hours as needed for shortness of breath / dyspnea or wheezing       amLODIPine 10 MG tablet    NORVASC    30 tablet    Take 1 tablet (10 mg) by mouth daily       ASPIRIN LOW DOSE 81 MG EC tablet   Generic drug:  aspirin     30 tablet    Take 1 tablet (81 mg) by mouth daily       atorvastatin 40 MG tablet    LIPITOR    90 tablet    Take 1 tablet (40 mg) by mouth daily       blood glucose monitoring lancets     1 Box    Test BS four times daily as directed       blood glucose monitoring test strip    no brand specified    2 Box    1 strip by In Vitro route 2 times daily Freestyle Lite       budesonide-formoterol 80-4.5 MCG/ACT Inhaler    SYMBICORT    1 Inhaler    Inhale 2 puffs into the lungs 2 times daily       docusate sodium 100 MG capsule    COLACE    60 capsule    Take 1 capsule (100 mg) by mouth 2 times daily       EUCERIN CALMING DAILY MOIST Crea     1 Tube    Externally apply 1 dose * topically daily       ferrous sulfate 325 (65 FE) MG tablet    IRON    100 tablet    Take 1 tablet (325 mg) by mouth daily (with breakfast)       furosemide 20 MG tablet    LASIX    60 tablet    Take 1 tablet (20 mg) by mouth daily       glucose-vitamin C 4-0.006 G Chew    DEX4 GLUCOSE    50 tablet    Take 1 tablet (4 g) by mouth every hour as needed for low blood sugar       insulin glargine 100 UNIT/ML injection    LANTUS    1 Month    Inject 40 Units Subcutaneous every morning Pt reports down to 42 per MTM ON 12/08/2016       insulin pen needle 31G X 6 MM     120 each    Use as directed       Isopropyl Alcohol 70 % Misc    [employer] ISOPROPYL ALCOHOL WIPES    100 each    Externally apply 1 pad topically 3 times daily       ketorolac 0.5 % ophthalmic solution    ACULAR         levothyroxine 200 MCG tablet    SYNTHROID/LEVOTHROID    90 tablet    Take 1 tablet (200 mcg) by mouth See Admin Instructions New daily increase. Recheck labs in 8 weeks.       losartan 100 MG tablet    COZAAR    90 tablet    Take 1 tablet (100  mg) by mouth daily       metoprolol 25 MG 24 hr tablet    TOPROL-XL    90 tablet    Take 1 tablet (25 mg) by mouth daily       nitroglycerin 0.4 MG sublingual tablet    NITROSTAT    25 tablet    Place 1 tablet (0.4 mg) under the tongue every 5 minutes as needed for chest pain       nystatin 869323 UNIT/GM Powd    MYCOSTATIN    30 g    Apply 1 g topically 3 times daily as needed       * order for DME     1 Device    Equipment being ordered: DM shoes and inserts Bunion, callus       * order for DME     2 Device    Equipment being ordered: 2 pair moderate level knee high support hose       * order for DME     1 Device    Equipment being ordered: One wheeled walker with seat, brakes and basket To replace lost walker       order for DME     1 Device    Equipment being ordered:  4 wheeled walker with basket, seat and hand brakes       * order for DME     1 Device    Equipment being ordered: one automated BP machine and large cuff       * order for DME     2 Device    Equipment being ordered: 2 pairs of moderate support knee high support hose       * order for DME     1 Device    DispRefillsStartEndDAW ?ORDER FOR DME, SET TO LOCAL PRINT,1 Device0-- ?Sig: Equipment being ordered: DM shoes and inserts  Bunion, callus ?Class: Local Print ? ? Medication Administration Instructions  ?Equipment being ordered: DM shoes and inserts Bunion, callus       oxyCODONE 10 MG IR tablet    ROXICODONE    90 tablet    Take 1 tablet (10 mg) by mouth every 8 hours as needed       polyethylene glycol powder    MIRALAX    510 g    Take 17 g by mouth daily       SM ALCOHOL PREP 70 % Pads          SYSTANE BALANCE OP      Apply to eye as needed       vitamin D 2000 UNITS tablet     60 tablet    Take 2,000 Units by mouth daily       * Notice:  This list has 6 medication(s) that are the same as other medications prescribed for you. Read the directions carefully, and ask your doctor or other care provider to review them with you.

## 2017-01-11 NOTE — PROGRESS NOTES
".  SUBJECTIVE/OBJECTIVE:                                                    Kim Anne is a 71 year old female coming in for a follow-up visit for Medication Therapy Management.  She was referred to me from Mai Babcock Pt added on today as a PCP covisit.    Chief Complaint: HTN follow-up. Last MTM visit on 12/2/16. CMR not completed today because this was a covisit.    Medication Adherence: issues found and discussed below. Patient did not bring medications to visit. She also isn't taking her HTN meds consistently - didn't take HTN meds this morning (because her cab came early). Told PCP that she took HTN meds yesterday, however tells me that she didn't take them yesterday because she was \"very busy cleaning out the refrigerator.\"    Hypertension: Current medications include amlodipine 10mg every day, furosemide 20mg once per day, losartan 100mg daily and metoprolol succinate 25mg daily.  Patient does self-monitor BP - SBP in the 180-190mmHg range.  Patient reports no current medication side effects.     Current labs include:  BP Readings from Last 3 Encounters:   01/11/17 160/68   12/12/16 140/90   12/02/16 153/78       A1C      6.3   12/13/2016  A1C      7.2   10/11/2016  A1C      9.1   8/29/2016  A1C      7.2   6/30/2016  A1C      6.8   12/9/2015    Recent Labs   Lab Test  08/29/16   1652  12/09/15   0946   12/20/13   1022   10/19/11   1111   CHOL   --    --    --   219*   --   169   HDL   --    --    --   43*   --   49*   LDL  85  72   < >  109   < >  85   TRIG   --    --    --   331*   --   175*   CHOLHDLRATIO   --    --    --   5.0   --   3.0    < > = values in this interval not displayed.       MICROL    75626   6/30/2016  MICROALBUMIN   9485.29   6/30/2016    Last Basic Metabolic Panel:  NA      144   11/4/2016   POTASSIUM      4.5   11/4/2016  CHLORIDE      115   11/4/2016  FRANCES      7.9   11/4/2016  CO2       21   11/4/2016  BUN       37   11/4/2016  CR     2.09   11/4/2016  GLC       88   " 11/4/2016    GFR ESTIMATE   Date Value Ref Range Status   11/04/2016 23* >60 mL/min/1.7m2 Final     Comment:     Non  GFR Calc   11/03/2016 20* >60 mL/min/1.7m2 Final     Comment:     Non  GFR Calc   11/02/2016 17* >60 mL/min/1.7m2 Final     Comment:     Non  GFR Calc     TSH   Date Value Ref Range Status   11/03/2016 1.28 0.40 - 4.00 mU/L Final     T4 FREE   Date Value Ref Range Status   10/11/2016 0.87 0.76 - 1.46 ng/dL Final     Wt Readings from Last 2 Encounters:   01/11/17 196 lb 8 oz (89.132 kg)   12/08/16 197 lb 8 oz (89.585 kg)     Most Recent Immunizations   Administered Date(s) Administered     Hepatitis B 06/28/2012     Influenza (High Dose) 3 valent vaccine 10/11/2016     Influenza (IIV3) 11/04/2014     Pneumococcal (PCV 13) 01/05/2015     Pneumococcal 23 valent 04/01/2011     TD (ADULT, 7+) 07/12/2007     TDAP (ADACEL AGES 11-64) 05/18/2011     Twinrix A/B 03/21/2012     Zoster vaccine, live 04/30/2012     ASSESSMENT:                                                    Current medications were reviewed with her today.      Medication Adherence: needs improvement. Discussed that pt should be taking her BP medications every day, no matter how busy she is. Discussed potential for end organ damage and the risk of MI/stroke. Pt says that she has not heard that high BP can cause a stroke before.     Hypertension: Needs Improvement. Patient is not meeting BP goal of < 140/90mmHg. She should start taking her meds daily, as prescribed.       PLAN:                                                      1. Pt to take HTN meds every day, as prescribed.     I spent 30 minutes with this patient today.  All changes were made via collaborative practice agreement with Mai Babcock. A copy of the visit note was provided to the patient's primary care provider.     Will follow up in 1 week with all her medications.     The patient was given a summary of these  recommendations as an after visit summary.    Pratibha Barth, PharmD  Medication Therapy Management Pharmacist  Pager: [phone]

## 2017-01-13 DIAGNOSIS — N18.4 CKD (CHRONIC KIDNEY DISEASE) STAGE 4, GFR 15-29 ML/MIN (H): ICD-10-CM

## 2017-01-13 DIAGNOSIS — N04.9 NEPHROTIC SYNDROME: Primary | ICD-10-CM

## 2017-01-13 NOTE — NURSING NOTE
Labs per clinic 2A protocol.  Follow up/CKD 3/4  Last OV: 3/1/16  Marian Almeida LPN  Nephrology  [facility]  [phone]

## 2017-01-16 ENCOUNTER — TELEPHONE (OUTPATIENT)
Dept: NEPHROLOGY | Facility: CLINIC | Age: 72
End: 2017-01-16

## 2017-01-16 NOTE — TELEPHONE ENCOUNTER
Marcelina, this is Dr. bermudez office from Clinic 3B at the [facility]. We are calling to remind you of your upcoming Nephrology appointment on 1/27/17 at 1010am. Please arrive about 1 hours prior to your appointment time for labs. Also, please bring an updated medication list or your labeled medication bottles with you to your appointment. If you have any questions or would like to cancel or reschedule your appointment, please call us at [phone].     You are welcome to have your labs done up to a week before your appointment at any [place] or [facility] facility. If you have your labs completed before your appointment, please still come 30 minutes early for check-in  ANNALEE SYED CMA

## 2017-01-17 ENCOUNTER — CARE COORDINATION (OUTPATIENT)
Dept: GERIATRIC MEDICINE | Facility: CLINIC | Age: 72
End: 2017-01-17

## 2017-01-17 NOTE — PROGRESS NOTES
Per member, arranged transportation thru [facility] for the below appt:  Appt Date: 1/18 at 3pm  Clinic Name:  [facility], [address], [place]  Transportation Provider: [facility]   time:  2pm    Notified member of  time.  Shakila Fountain  Case Management Specialist  [facility]  [phone]

## 2017-01-18 ENCOUNTER — OFFICE VISIT (OUTPATIENT)
Dept: PHARMACY | Facility: CLINIC | Age: 72
End: 2017-01-18
Payer: COMMERCIAL

## 2017-01-18 VITALS
OXYGEN SATURATION: 97 % | SYSTOLIC BLOOD PRESSURE: 118 MMHG | RESPIRATION RATE: 18 BRPM | HEART RATE: 74 BPM | WEIGHT: 199.5 LBS | DIASTOLIC BLOOD PRESSURE: 81 MMHG | TEMPERATURE: 99.4 F | BODY MASS INDEX: 33.2 KG/M2

## 2017-01-18 DIAGNOSIS — E11.59 HYPERTENSION ASSOCIATED WITH DIABETES (H): Primary | ICD-10-CM

## 2017-01-18 DIAGNOSIS — E63.9 NUTRITIONAL DEFICIENCY: ICD-10-CM

## 2017-01-18 DIAGNOSIS — E11.22 CONTROLLED TYPE 2 DIABETES MELLITUS WITH STAGE 4 CHRONIC KIDNEY DISEASE, WITH LONG-TERM CURRENT USE OF INSULIN (H): ICD-10-CM

## 2017-01-18 DIAGNOSIS — Z79.4 CONTROLLED TYPE 2 DIABETES MELLITUS WITH STAGE 4 CHRONIC KIDNEY DISEASE, WITH LONG-TERM CURRENT USE OF INSULIN (H): ICD-10-CM

## 2017-01-18 DIAGNOSIS — E03.9 ACQUIRED HYPOTHYROIDISM: ICD-10-CM

## 2017-01-18 DIAGNOSIS — K59.00 CONSTIPATION, UNSPECIFIED CONSTIPATION TYPE: ICD-10-CM

## 2017-01-18 DIAGNOSIS — N18.4 CONTROLLED TYPE 2 DIABETES MELLITUS WITH STAGE 4 CHRONIC KIDNEY DISEASE, WITH LONG-TERM CURRENT USE OF INSULIN (H): ICD-10-CM

## 2017-01-18 DIAGNOSIS — E78.5 HYPERLIPIDEMIA LDL GOAL <100: ICD-10-CM

## 2017-01-18 DIAGNOSIS — J44.9 CHRONIC OBSTRUCTIVE PULMONARY DISEASE, UNSPECIFIED COPD TYPE (H): ICD-10-CM

## 2017-01-18 DIAGNOSIS — I15.2 HYPERTENSION ASSOCIATED WITH DIABETES (H): Primary | ICD-10-CM

## 2017-01-18 DIAGNOSIS — I25.10 CORONARY ARTERY DISEASE INVOLVING NATIVE CORONARY ARTERY OF NATIVE HEART WITHOUT ANGINA PECTORIS: ICD-10-CM

## 2017-01-18 PROCEDURE — 99606 MTMS BY PHARM EST 15 MIN: CPT | Mod: GY | Performed by: PHARMACIST

## 2017-01-18 PROCEDURE — 99607 MTMS BY PHARM ADDL 15 MIN: CPT | Mod: GY | Performed by: PHARMACIST

## 2017-01-18 RX ORDER — BUDESONIDE AND FORMOTEROL FUMARATE DIHYDRATE 80; 4.5 UG/1; UG/1
2 AEROSOL RESPIRATORY (INHALATION) 2 TIMES DAILY
Qty: 1 INHALER | Refills: 1 | Status: SHIPPED | OUTPATIENT
Start: 2017-01-18 | End: 2017-03-10

## 2017-01-18 RX ORDER — ATORVASTATIN CALCIUM 40 MG/1
40 TABLET, FILM COATED ORAL DAILY
Qty: 90 TABLET | Refills: 1 | Status: SHIPPED | OUTPATIENT
Start: 2017-01-18 | End: 2017-05-10

## 2017-01-18 NOTE — PATIENT INSTRUCTIONS
Recommendations from today's MTM visit:                                                      1. Decrease Lantus to 30 units daily.     2. The Ventolin (blue inhaler) and Proair (red inhaler) are the same medication. Take these only when you are having trouble breathing. You should be taking the Symbicort twice a day every day. Look for that at home.     3. We filled up your pill boxes today. Keep up the great work with taking them!    4. Restart the atorvastatin and the Symbicort.    Next MTM visit: 1 month    To schedule another MTM appointment, please call the clinic directly or you may call the MTM scheduling line at [phone] or toll-free at [phone].     My Clinical Pharmacist's contact information:                                                      It was a pleasure seeing you today!  Please feel free to contact me with any questions or concerns you have.      Pratibha Barth, PharmD  Medication Therapy Management Pharmacist  Pager: [phone]    You may receive a survey about the MTM services you received.  I would appreciate your feedback to help me serve you better in the future. Please fill it out and return it when you can. Your comments will be anonymous.

## 2017-01-18 NOTE — NURSING NOTE
"Chief Complaint   Patient presents with     Medication Therapy Management       Initial /81 mmHg  Pulse 74  Temp(Src) 99.4  F (37.4  C) (Oral)  Resp 18  Wt 199 lb 8 oz (90.493 kg)  SpO2 97% Estimated body mass index is 33.2 kg/(m^2) as calculated from the following:    Height as of 1/11/17: 5' 5\" (1.651 m).    Weight as of this encounter: 199 lb 8 oz (90.493 kg).  BP completed using cuff size: regular    Latasha Esquivel MA      "

## 2017-01-18 NOTE — MR AVS SNAPSHOT
After Visit Summary   1/18/2017    Kim Anne    MRN: [id number]           Patient Information     Date Of Birth          1945        Visit Information        Provider Department      1/18/2017 3:00 PM Pratibha Barth [facility] MTM        Today's Diagnoses     Chronic obstructive pulmonary disease, unspecified COPD type (H)    -  1     Hyperlipidemia LDL goal <100           Care Instructions    Recommendations from today's MTM visit:                                                      1. Decrease Lantus to 30 units daily.     2. The Ventolin (blue inhaler) and Proair (red inhaler) are the same medication. Take these only when you are having trouble breathing. You should be taking the Symbicort twice a day every day. Look for that at home.     3. We filled up your pill boxes today. Keep up the great work with taking them!    4. Restart the atorvastatin and the Symbicort.    Next MTM visit: 1 month    To schedule another MTM appointment, please call the clinic directly or you may call the MTM scheduling line at [phone] or toll-free at [phone].     My Clinical Pharmacist's contact information:                                                      It was a pleasure seeing you today!  Please feel free to contact me with any questions or concerns you have.      Pratibha Barth, PharmD  Medication Therapy Management Pharmacist  Pager: [phone]    You may receive a survey about the MTM services you received.  I would appreciate your feedback to help me serve you better in the future. Please fill it out and return it when you can. Your comments will be anonymous.              Follow-ups after your visit        Your next 10 appointments already scheduled     Jan 27, 2017  9:15 AM   Lab with  LAB    [facility] ([facility])    [address]  [facility] [address] [phone]             "Jan 27, 2017 10:10 AM   (Arrive by 9:40 AM)   Return Visit with Bhargav Lopes MD   [facility] ([facility])    [address] [facility]  3rd [facility] [id number] [phone]            Feb 13, 2017 10:30 AM   Return Visit with Mai Babcock MD   [facility] ([facility])    [address]  [address]  [facility] [id number] [phone]            Feb 23, 2017  1:00 PM   (Arrive by 12:45 PM)   Return Visit with Emily Cordova MD   [facility] ([facility])    [address] [id number] [phone]              Who to contact     If you have questions or need follow up information about today's clinic visit or your schedule please contact [facility] directly at [phone].  Normal or non-critical lab and imaging results will be communicated to you by StemCellshart, letter or phone within 4 business days after the clinic has received the results. If you do not hear from us within 7 days, please contact the clinic through StemCellshart or phone. If you have a critical or abnormal lab result, we will notify you by phone as soon as possible.  Submit refill requests through [employer] or call your pharmacy and they will forward the refill request to us. Please allow 3 business days for your refill to be completed.          Additional Information About Your Visit        [employer] Information     [employer] lets you send messages to your doctor, view your test results, renew your prescriptions, schedule appointments and more. To sign up, go to [email or web address] . Click on \"Log in\" on the left side of the screen, which will take you to the Welcome page. Then click on \"Sign up Now\" on the right side of the page.     You will be asked to enter the access code listed below, as well as some personal " information. Please follow the directions to create your username and password.     Your access code is: [email or web address]  Expires: 2017 10:10 AM     Your access code will  in 90 days. If you need help or a new code, please call your [facility] or [phone].        Care EveryWhere ID     This is your Care EveryWhere ID. This could be used by other organizations to access your [facility] medical records  [id number]        Your Vitals Were     Pulse Temperature Respirations Pulse Oximetry          74 99.4  F (37.4  C) (Oral) 18 97%         Blood Pressure from Last 3 Encounters:   17 118/81   17 160/68   16 140/90    Weight from Last 3 Encounters:   17 199 lb 8 oz (90.493 kg)   17 196 lb 8 oz (89.132 kg)   16 197 lb 8 oz (89.585 kg)              Today, you had the following     No orders found for display         Where to get your medicines      These medications were sent to [facility] [place], [place] - [address] [facility] [address]  [address] [facility] [address], [facility] [address]     Phone:  [phone]    - atorvastatin 40 MG tablet  - budesonide-formoterol 80-4.5 MCG/ACT Inhaler       Primary Care Provider Office Phone # Fax #    Mai Babcock [phone]       [facility] [address]  [facility] [address]        Thank you!     Thank you for choosing [facility]  for your care. Our goal is always to provide you with excellent care. Hearing back from our patients is one way we can continue to improve our services. Please take a few minutes to complete the written survey that you may receive in the mail after your visit with us. Thank you!             Your Updated Medication List - Protect others around you: Learn how to safely use, store and throw away your medicines at www.disposemymeds.org.          This list is accurate as of: 17  4:21 PM.  Always use your most recent  med list.                   Brand Name Dispense Instructions for use    albuterol 108 (90 BASE) MCG/ACT Inhaler    PROAIR HFA/PROVENTIL HFA/VENTOLIN HFA    18 g    Inhale 2 puffs into the lungs every 6 hours as needed for shortness of breath / dyspnea or wheezing       amLODIPine 10 MG tablet    NORVASC    30 tablet    Take 1 tablet (10 mg) by mouth daily       ASPIRIN LOW DOSE 81 MG EC tablet   Generic drug:  aspirin     30 tablet    Take 1 tablet (81 mg) by mouth daily       atorvastatin 40 MG tablet    LIPITOR    90 tablet    Take 1 tablet (40 mg) by mouth daily       blood glucose monitoring lancets     1 Box    Test BS four times daily as directed       blood glucose monitoring test strip    no brand specified    2 Box    1 strip by In Vitro route 2 times daily Freestyle Lite       budesonide-formoterol 80-4.5 MCG/ACT Inhaler    SYMBICORT    1 Inhaler    Inhale 2 puffs into the lungs 2 times daily       docusate sodium 100 MG capsule    COLACE    60 capsule    Take 1 capsule (100 mg) by mouth 2 times daily       EUCERIN CALMING DAILY MOIST Crea     1 Tube    Externally apply 1 dose * topically daily       ferrous sulfate 325 (65 FE) MG tablet    IRON    100 tablet    Take 1 tablet (325 mg) by mouth daily (with breakfast)       furosemide 20 MG tablet    LASIX    60 tablet    Take 1 tablet (20 mg) by mouth daily       glucose-vitamin C 4-0.006 G Chew    DEX4 GLUCOSE    50 tablet    Take 1 tablet (4 g) by mouth every hour as needed for low blood sugar       insulin glargine 100 UNIT/ML injection    LANTUS    1 Month    Inject 40 Units Subcutaneous every morning Pt reports down to 42 per MTM ON 12/08/2016       insulin pen needle 31G X 6 MM     120 each    Use as directed       ketorolac 0.5 % ophthalmic solution    ACULAR         levothyroxine 200 MCG tablet    SYNTHROID/LEVOTHROID    90 tablet    Take 1 tablet (200 mcg) by mouth See Admin Instructions New daily increase. Recheck labs in 8 weeks.       losartan  100 MG tablet    COZAAR    90 tablet    Take 1 tablet (100 mg) by mouth daily       metoprolol 25 MG 24 hr tablet    TOPROL-XL    90 tablet    Take 1 tablet (25 mg) by mouth daily       nitroglycerin 0.4 MG sublingual tablet    NITROSTAT    25 tablet    Place 1 tablet (0.4 mg) under the tongue every 5 minutes as needed for chest pain       nystatin 445211 UNIT/GM Powd    MYCOSTATIN    30 g    Apply 1 g topically 3 times daily as needed       order for DME     1 Device    Equipment being ordered:  4 wheeled walker with basket, seat and hand brakes       * order for DME     1 Device    Equipment being ordered: one automated BP machine and large cuff       * order for DME     1 Device    DispRefillsStartEndDAW ?ORDER FOR DME, SET TO LOCAL PRINT,1 Device0-- ?Sig: Equipment being ordered: DM shoes and inserts  Bunion, callus ?Class: Local Print ? ? Medication Administration Instructions  ?Equipment being ordered: DM shoes and inserts Bunion, callus       oxyCODONE 10 MG IR tablet    ROXICODONE    90 tablet    Take 1 tablet (10 mg) by mouth every 8 hours as needed       polyethylene glycol powder    MIRALAX    510 g    Take 17 g by mouth daily       SM ALCOHOL PREP 70 % Pads          vitamin D 2000 UNITS tablet     60 tablet    Take 2,000 Units by mouth daily       * Notice:  This list has 2 medication(s) that are the same as other medications prescribed for you. Read the directions carefully, and ask your doctor or other care provider to review them with you.

## 2017-01-18 NOTE — PROGRESS NOTES
SUBJECTIVE/OBJECTIVE:                                                    Kim Anne is a 71 year old female coming in for a follow-up visit for Medication Therapy Management.  She was referred to me from Mai Babcock.     Chief Complaint: HTN follow-up and CMR. Last [facility] visit on 1/11/16.    Social: Son is on life support, [facility] support brought in because pt doesn't meet with a therapist regularly.     Medication Adherence: history of med compliance issues - frequently forgets to take her medications. Pt says that she used to use a pill box for her medications, but hers broke. She reports that she was better at taking her medications when she had the pill boxes.     Hypertension/CAD: Current medications include amlodipine 10mg every day, furosemide 20mg once per day, losartan 100mg daily and metoprolol succinate 25mg daily.  Patient does self-monitor BP with the values as below (in mmHg). Also has NTG SL on-hand for chest pain, though hasn't used this. Patient reports no current medication side effects.  94/67  164/75  110/79  174/77  119/79  165/73  125/84  186/89  106/75  166/83   110/75    Diabetes:  Pt currently taking Lantus 40 units daily. She did take 20 units of Lantus yesterday because had a hypoglycemic episode and woke up with a reading of 107mg/dL. Also has glucose tablets on-hand for hypoglycemia, though she usually just drinks juice when her BGs are low. Pt is experiencing the following side effects:  none  SMBG: two times daily.   Ranges (patient reported): 49-180mg/dL  Symptoms of low blood sugar? Shakiness, confusion. Frequency of hypoglycemia? Rarely, last yesterday.  Recent symptoms of high blood sugar? none  Eye exam: up to date  Foot exam: up to date  Microalbumin is not < 30 mg/g. Pt is taking an ACEi/ARB. Due for recheck.  Aspirin: Taking 81mg daily and denies side effects    Hyperlipidemia: Current therapy includes no current medications, has stopped atorvastatin because she  needs a refill.    COPD:  Current medications: Albuterol MDI and Budesonide+Formoterol (Symbicort) rarely. Has a complicated system of when she takes Ventolin and when she takes Proair, as she has both of these. Is unaware that these are both albuterol. Pt rinses their mouth after using steroid inhaler.   Pt reports the following symptoms: increased SOB at rest.    Hypothyroidism: Patient is taking levothyroxine 200 mcg daily. Patient is having the following symptoms: none.    Supplements: Currently taking vitamin D 2,000IU daily, ferrous sulfate 325mg daily. Notes no side effects with these medications.     Constipation: Currently taking docusate 100mg BID and Miralax daily. Works well, no side effects.      Current labs include:  BP Readings from Last 3 Encounters:   01/18/17 118/81   01/11/17 160/68   12/12/16 140/90       A1C      6.3   12/13/2016  A1C      7.2   10/11/2016  A1C      9.1   8/29/2016  A1C      7.2   6/30/2016  A1C      6.8   12/9/2015    Recent Labs   Lab Test  08/29/16   1652  12/09/15   0946   12/20/13   1022   10/19/11   1111   CHOL   --    --    --   219*   --   169   HDL   --    --    --   43*   --   49*   LDL  85  72   < >  109   < >  85   TRIG   --    --    --   331*   --   175*   CHOLHDLRATIO   --    --    --   5.0   --   3.0    < > = values in this interval not displayed.     Last Basic Metabolic Panel:  NA      144   11/4/2016   POTASSIUM      4.5   11/4/2016  CHLORIDE      115   11/4/2016  FRANCES      7.9   11/4/2016  CO2       21   11/4/2016  BUN       37   11/4/2016  CR     2.09   11/4/2016  GLC       88   11/4/2016    GFR ESTIMATE   Date Value Ref Range Status   01/11/2017 22* >60 mL/min/1.7m2 Final     Comment:     Non  GFR Calc   11/04/2016 23* >60 mL/min/1.7m2 Final     Comment:     Non  GFR Calc   11/03/2016 20* >60 mL/min/1.7m2 Final     Comment:     Non  GFR Calc     TSH   Date Value Ref Range Status   11/03/2016 1.28 0.40 - 4.00  mU/L Final     T4 FREE   Date Value Ref Range Status   10/11/2016 0.87 0.76 - 1.46 ng/dL Final     Wt Readings from Last 2 Encounters:   01/18/17 199 lb 8 oz (90.493 kg)   01/11/17 196 lb 8 oz (89.132 kg)     Most Recent Immunizations   Administered Date(s) Administered     Hepatitis B 06/28/2012     Influenza (High Dose) 3 valent vaccine 10/11/2016     Influenza (IIV3) 11/04/2014     Pneumococcal (PCV 13) 01/05/2015     Pneumococcal 23 valent 04/01/2011     TD (ADULT, 7+) 07/12/2007     TDAP (ADACEL AGES 11-64) 05/18/2011     Twinrix A/B 03/21/2012     Zoster vaccine, live 04/30/2012     ASSESSMENT:                                                    Current medications were reviewed with her today.      Medication Adherence: Improving. Pt seems to be less resistant to take her BP meds as prescribed today. Pill boxes given to pt today and first week filled out so she can have all her meds together for this week.     Hypertension/CAD: At goal of BP <140/90 today, continued to encourage pt to take these as prescribed.     Diabetes: Needs Improvement. Patient is meeting A1c goal of < 7%. Pt is having significany hyopglycemic episodes and is taking varied amounts of insulin. Seems that Lantus dose is too high - should decrease  The dose, however not to the point that she decreased it after her hypoglycemic episode.     Hyperlipidemia: Needs Improvement. Pt is not on high intensity statin which is indicated based on 2013 ACC/AHA guidelines for lipid management. Though she was prior to running out. Should restart atorvastatin.     COPD:  Needs improvement. Discussed with patient that Ventolin and Proair are the same thing. Should be taking Symbicort BID as prescribed. Discussed that this is the best to prevent SOB, and may take a while to feel any effects.     Hypothyroidism: Stable.    Supplements: Stable.    Constipation: Stable.      PLAN:                                                      1. Decrease Lantus to 30  units daily.   2. The Ventolin (blue inhaler) and Proair (red inhaler) are the same medication. Take these only when you are having trouble breathing. You should be taking the Symbicort 2 puffs twice a day every day. Look for that at home.   3. We filled up your pill boxes today. Keep up the great work with taking them!  4. Restart the atorvastatin 40mg daily.    I spent 60 minutes with this patient today.  All changes were made via collaborative practice agreement with Mai Babcock. A copy of the visit note was provided to the patient's primary care provider.     Will follow up in 1 month.     The patient was given a summary of these recommendations as an after visit summary.    Pratibha Barth, PharmD  Medication Therapy Management Pharmacist  Pager: [phone]

## 2017-01-25 ENCOUNTER — CARE COORDINATION (OUTPATIENT)
Dept: GERIATRIC MEDICINE | Facility: CLINIC | Age: 72
End: 2017-01-25

## 2017-01-25 NOTE — PROGRESS NOTES
Per Sheldon, arranged transportation thru [facility] for the below appt:  Appt Date: 1/27/17 at 9:15 AM  Clinic Name:  [facility] [address] [place] [address]  Transportation Provider: [facility] [employer]   time:  8:45 AM    Notified Kim of  time.      Brianna Barrios

## 2017-01-27 ENCOUNTER — OFFICE VISIT (OUTPATIENT)
Dept: NEPHROLOGY | Facility: CLINIC | Age: 72
End: 2017-01-27
Attending: INTERNAL MEDICINE
Payer: MEDICARE

## 2017-01-27 VITALS
OXYGEN SATURATION: 95 % | HEART RATE: 76 BPM | DIASTOLIC BLOOD PRESSURE: 74 MMHG | HEIGHT: 65 IN | SYSTOLIC BLOOD PRESSURE: 102 MMHG | WEIGHT: 193.3 LBS | BODY MASS INDEX: 32.21 KG/M2 | TEMPERATURE: 98.4 F

## 2017-01-27 DIAGNOSIS — N18.4 CKD (CHRONIC KIDNEY DISEASE) STAGE 4, GFR 15-29 ML/MIN (H): ICD-10-CM

## 2017-01-27 DIAGNOSIS — N04.9 NEPHROTIC SYNDROME: ICD-10-CM

## 2017-01-27 DIAGNOSIS — I10 HYPERTENSION GOAL BP (BLOOD PRESSURE) < 130/80: ICD-10-CM

## 2017-01-27 DIAGNOSIS — N18.4 CKD (CHRONIC KIDNEY DISEASE) STAGE 4, GFR 15-29 ML/MIN (H): Primary | ICD-10-CM

## 2017-01-27 LAB
ALBUMIN SERPL-MCNC: 1.7 G/DL (ref 3.4–5)
ANION GAP SERPL CALCULATED.3IONS-SCNC: 11 MMOL/L (ref 3–14)
BUN SERPL-MCNC: 25 MG/DL (ref 7–30)
CALCIUM SERPL-MCNC: 8.3 MG/DL (ref 8.5–10.1)
CHLORIDE SERPL-SCNC: 113 MMOL/L (ref 94–109)
CO2 SERPL-SCNC: 19 MMOL/L (ref 20–32)
CREAT SERPL-MCNC: 2.47 MG/DL (ref 0.52–1.04)
CREAT UR-MCNC: 163 MG/DL
ERYTHROCYTE [DISTWIDTH] IN BLOOD BY AUTOMATED COUNT: 14.9 % (ref 10–15)
GFR SERPL CREATININE-BSD FRML MDRD: 19 ML/MIN/1.7M2
GLUCOSE SERPL-MCNC: 98 MG/DL (ref 70–99)
HCT VFR BLD AUTO: 41.5 % (ref 35–47)
HGB BLD-MCNC: 12.8 G/DL (ref 11.7–15.7)
MCH RBC QN AUTO: 28.6 PG (ref 26.5–33)
MCHC RBC AUTO-ENTMCNC: 30.8 G/DL (ref 31.5–36.5)
MCV RBC AUTO: 93 FL (ref 78–100)
PHOSPHATE SERPL-MCNC: 3.7 MG/DL (ref 2.5–4.5)
PLATELET # BLD AUTO: 408 10E9/L (ref 150–450)
POTASSIUM SERPL-SCNC: 5.4 MMOL/L (ref 3.4–5.3)
PROT UR-MCNC: 23.92 G/L
PROT/CREAT 24H UR: 14.67 G/G CR (ref 0–0.2)
RBC # BLD AUTO: 4.48 10E12/L (ref 3.8–5.2)
SODIUM SERPL-SCNC: 142 MMOL/L (ref 133–144)
WBC # BLD AUTO: 10.3 10E9/L (ref 4–11)

## 2017-01-27 PROCEDURE — 84156 ASSAY OF PROTEIN URINE: CPT | Performed by: INTERNAL MEDICINE

## 2017-01-27 PROCEDURE — 36415 COLL VENOUS BLD VENIPUNCTURE: CPT | Performed by: INTERNAL MEDICINE

## 2017-01-27 PROCEDURE — 99213 OFFICE O/P EST LOW 20 MIN: CPT | Mod: ZF

## 2017-01-27 PROCEDURE — 85027 COMPLETE CBC AUTOMATED: CPT | Performed by: INTERNAL MEDICINE

## 2017-01-27 PROCEDURE — 80069 RENAL FUNCTION PANEL: CPT | Performed by: INTERNAL MEDICINE

## 2017-01-27 ASSESSMENT — PAIN SCALES - GENERAL: PAINLEVEL: MODERATE PAIN (4)

## 2017-01-27 NOTE — NURSING NOTE
"Chief Complaint   Patient presents with     RECHECK     6 month follow up CKD stage 4       Initial /74 mmHg  Pulse 76  Temp(Src) 98.4  F (36.9  C) (Oral)  Ht 1.651 m (5' 5\")  Wt 87.68 kg (193 lb 4.8 oz)  BMI 32.17 kg/m2  SpO2 95% Estimated body mass index is 32.17 kg/(m^2) as calculated from the following:    Height as of this encounter: 1.651 m (5' 5\").    Weight as of this encounter: 87.68 kg (193 lb 4.8 oz).  BP completed using cuff size: anna MORALES CMA    "
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
## 2017-01-27 NOTE — MR AVS SNAPSHOT
After Visit Summary   1/27/2017    Kim Anne    MRN: [id number]           Patient Information     Date Of Birth          1945        Visit Information        Provider Department      1/27/2017 10:10 AM Bhargav Lopes MD [facility] Nephrology        Care Instructions    Discontinue amlodipine.  Don't eat foods that are high in potassium: oranges, orange juice, bananas, tomatoes .        Follow-ups after your visit        Follow-up notes from your care team     Return in about 3 months (around 4/27/2017).      Your next 10 appointments already scheduled     Feb 13, 2017 10:30 AM   Return Visit with Mai Babcock MD   [facility] ([facility])    [address] [place] [address] [address] [place] [id number] [phone]            Feb 17, 2017 11:00 AM   SHORT with Pratibha Barth   [facility] ([facility])    [address] [place] [address]  [address] [place] [id number] [phone]            Feb 23, 2017  1:00 PM   (Arrive by 12:45 PM)   Return Visit with Emily Cordova MD   [facility] ([facility])    [address] [place] [address]  [address]rd [facility] [id number] [phone]            Apr 21, 2017  8:45 AM   Lab with  LAB   [facility] ([facility])    [address] [place] [id number] [phone]            Apr 21, 2017  9:40 AM   (Arrive by 9:10 AM)   Return Visit with Bhargav Lopes MD   [facility] ([facility])    [address] [place] [address]  3rd [facility] [id number] [phone]              Who to contact     If you have questions or need follow up information about today's clinic visit or your schedule please contact [facility] NEPHROLOG[facility] directly at [phone].  Normal or non-critical lab and  "imaging results will be communicated to you by MyChart, letter or phone within 4 business days after the clinic has received the results. If you do not hear from us within 7 days, please contact the clinic through [facility] or phone. If you have a critical or abnormal lab result, we will notify you by phone as soon as possible.  Submit refill requests through [facility] or call your pharmacy and they will forward the refill request to us. Please allow 3 business days for your refill to be completed.          Additional Information About Your Visit        [facility] Information     [facility] lets you send messages to your doctor, view your test results, renew your prescriptions, schedule appointments and more. To sign up, go to [email or web address] . Click on \"Log in\" on the left side of the screen, which will take you to the Welcome page. Then click on \"Sign up Now\" on the right side of the page.     You will be asked to enter the access code listed below, as well as some personal information. Please follow the directions to create your username and password.     Your access code is: [email or web address]  Expires: 2017 10:10 AM     Your access code will  in 90 days. If you need help or a new code, please call your [facility] clinic or [phone].        Care EveryWhere ID     This is your Care EveryWhere ID. This could be used by other organizations to access your [facility] medical records  [id number]        Your Vitals Were     Pulse Temperature Height BMI (Body Mass Index) Pulse Oximetry       76 98.4  F (36.9  C) (Oral) 1.651 m (5' 5\") 32.17 kg/m2 95%        Blood Pressure from Last 3 Encounters:   17 102/74   17 118/81   17 160/68    Weight from Last 3 Encounters:   17 87.68 kg (193 lb 4.8 oz)   17 90.493 kg (199 lb 8 oz)   17 89.132 kg (196 lb 8 oz)              Today, you had the following     No orders found for display         Today's Medication Changes          These changes " are accurate as of: 1/27/17 10:57 AM.  If you have any questions, ask your nurse or doctor.               Stop taking these medicines if you haven't already. Please contact your care team if you have questions.     amLODIPine 10 MG tablet   Commonly known as:  NORVASC   Stopped by:  Bhargav Lopes MD                    Primary Care Provider Office Phone # Fax #    Mai Babcock [phone]       [address] [address]        Thank you!     Thank you for choosing [facility] NEPHROLOGY  for your care. Our goal is always to provide you with excellent care. Hearing back from our patients is one way we can continue to improve our services. Please take a few minutes to complete the written survey that you may receive in the mail after your visit with us. Thank you!             Your Updated Medication List - Protect others around you: Learn how to safely use, store and throw away your medicines at www.disposemymeds.org.          This list is accurate as of: 1/27/17 10:57 AM.  Always use your most recent med list.                   Brand Name Dispense Instructions for use    albuterol 108 (90 BASE) MCG/ACT Inhaler    PROAIR HFA/PROVENTIL HFA/VENTOLIN HFA    18 g    Inhale 2 puffs into the lungs every 6 hours as needed for shortness of breath / dyspnea or wheezing       ASPIRIN LOW DOSE 81 MG EC tablet   Generic drug:  aspirin     30 tablet    Take 1 tablet (81 mg) by mouth daily       atorvastatin 40 MG tablet    LIPITOR    90 tablet    Take 1 tablet (40 mg) by mouth daily       blood glucose monitoring lancets     1 Box    Test BS four times daily as directed       blood glucose monitoring test strip    no brand specified    2 Box    1 strip by In Vitro route 2 times daily Freestyle Lite       budesonide-formoterol 80-4.5 MCG/ACT Inhaler    SYMBICORT    1 Inhaler    Inhale 2 puffs into the lungs 2 times daily       docusate sodium 100 MG capsule    COLACE     60 capsule    Take 1 capsule (100 mg) by mouth 2 times daily       EUCERIN CALMING DAILY MOIST Crea     1 Tube    Externally apply 1 dose * topically daily       ferrous sulfate 325 (65 FE) MG tablet    IRON    100 tablet    Take 1 tablet (325 mg) by mouth daily (with breakfast)       furosemide 20 MG tablet    LASIX    60 tablet    Take 1 tablet (20 mg) by mouth daily       glucose-vitamin C 4-0.006 G Chew    DEX4 GLUCOSE    50 tablet    Take 1 tablet (4 g) by mouth every hour as needed for low blood sugar       insulin glargine 100 UNIT/ML injection    LANTUS    15 mL    Inject 30 Units Subcutaneous every morning       insulin pen needle 31G X 6 MM     120 each    Use as directed       levothyroxine 200 MCG tablet    SYNTHROID/LEVOTHROID    90 tablet    Take 1 tablet (200 mcg) by mouth See Admin Instructions New daily increase. Recheck labs in 8 weeks.       losartan 100 MG tablet    COZAAR    90 tablet    Take 1 tablet (100 mg) by mouth daily       metoprolol 25 MG 24 hr tablet    TOPROL-XL    90 tablet    Take 1 tablet (25 mg) by mouth daily       nitroglycerin 0.4 MG sublingual tablet    NITROSTAT    25 tablet    Place 1 tablet (0.4 mg) under the tongue every 5 minutes as needed for chest pain       nystatin 702897 UNIT/GM Powd    MYCOSTATIN    30 g    Apply 1 g topically 3 times daily as needed       oxyCODONE 10 MG IR tablet    ROXICODONE    90 tablet    Take 1 tablet (10 mg) by mouth every 8 hours as needed       polyethylene glycol powder    MIRALAX    510 g    Take 17 g by mouth daily       SM ALCOHOL PREP 70 % Pads          vitamin D 2000 UNITS tablet     60 tablet    Take 2,000 Units by mouth daily

## 2017-01-27 NOTE — PATIENT INSTRUCTIONS
Discontinue amlodipine.  Don't eat foods that are high in potassium: oranges, orange juice, bananas, tomatoes .
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
## 2017-02-01 ENCOUNTER — TELEPHONE (OUTPATIENT)
Dept: FAMILY MEDICINE | Facility: CLINIC | Age: 72
End: 2017-02-01

## 2017-02-01 NOTE — TELEPHONE ENCOUNTER
Incoming call from pt inquiring about PCA services. Please follow up. Contact info: [phone]     Sonal Chavarria

## 2017-02-06 NOTE — PROGRESS NOTES
SUBJECTIVE:                                                    Kim Anne is a 71 year old female who presents to clinic today for the following health issues:    Patient Description: Obese  female with glasses    Diabetes Follow-up  A1C goal: <7    A1C      6.3   12/13/2016  A1C      7.2   10/11/2016  A1C      9.1   8/29/2016  A1C      7.2   6/30/2016  A1C      6.8   12/9/2015  TSH   Date Value Ref Range Status   11/03/2016 1.28 0.40 - 4.00 mU/L Final         Patient is checking blood sugars: 2     Patient reports taking Lantus at night as of 1 month ago.     Today: 126    65-2 week low - had a small amount of crackers and cheese that day.    267-2 week high - due to donuts    Diabetic concerns: None     Symptoms of hypoglycemia (low blood sugar): none     Paresthesias (numbness or burning in feet) or sores: Yes Burning      Diabetic eye exam within the last year: Yes     Hyperlipidemia Follow-Up  LDL goal: <100    LDL Cholesterol Calculated   Date Value Ref Range Status   12/20/2013 109 0 - 129 mg/dL Final     Comment:     LDL Cholesterol is the primary guide to therapy: LDL-cholesterol goal in high   risk patients is <100 mg/dL and in very high risk patients is <70 mg/dL.     LDL Cholesterol Direct   Date Value Ref Range Status   08/29/2016 85 <100 mg/dL Final     Comment:     Desirable:       <100 mg/dl       Rate your low fat/cholesterol diet?: fair    Taking statin?  Yes, no muscle aches from statin    Other lipid medications/supplements?:  none     Hypertension Follow-up  BP goal: <130/80    BP Readings from Last 3 Encounters:   02/13/17 158/81   01/27/17 102/74   01/18/17 118/81     Last Basic Metabolic Panel:  NA      142   1/27/2017   POTASSIUM      5.4   1/27/2017  CHLORIDE      113   1/27/2017  FRANCES      8.3   1/27/2017  CO2       19   1/27/2017  BUN       25   1/27/2017  CR     2.47   1/27/2017  GLC       98   1/27/2017      Outpatient blood pressures are being checked at Home .   Results are Normal .    Low Salt Diet: no added salt     Depression Followup    Status since last visit:  Pt reports not a current issue she is just worried about her son who is on life support.     See PHQ-9 for current symptoms.  Other associated symptoms: None    Complicating factors:   Significant life event:  Yes Son in hospital on life support    Current substance abuse:  None  Anxiety or Panic symptoms:  No  MENTAL STATUS EXAM  Appearance: Appears stated age, dressed and groomed appropriately for the visit today.  Attitude: cooperative  Behavior: normal  Eye Contact: normal  Speech: normal  Orientation: oriented to person, place, time and situation  Mood:  No anxiety or depression today.   Affect: Mood Congruent  Thought Process: clear  Suicidal Ideation: reports no thoughts, no intention  Hallucination: no  Paranoid-no  Manic-no  Panic-no  Self harm-no  OCD-no  Gambling-no    Sleep - Good last night - about 8 hours last night. Nocturia 2 times  Appetite - Fasting this morning. Taking Lantus at night (30 units).   Exercise - Walking pushing a wheel chair for less than 5 minutes    Recent falls - no  Recent blackouts - no  Seizures - no    Smoking - Smoking again - 10 per day. Declines help to quit though patient accepted quit plan card.   Alcohol - no  Street drugs - no  Marijuana - no  Caffeine - Coffee (1 cup per day)    Any ER/UC or hospital visits in the last 2 weeks? - no  Eye exam up to date? - Due    Pain rated as a 6/10 today radiating to left leg. Best is a 2-4/10.    Patient reports that her son is on life support at the hospital.     Using albuterol 2-3 times per day.     Chest pain - left chest burning sensation, at home while sitting down just after going to [employer] store. Lasted 1 minute.       PHQ-9  English PHQ-9   Any Language             Amount of exercise or physical activity: Walking    Problems taking medications regularly: No    Medication side effects: none    Diet: diabetic    Social  History   Substance Use Topics     Smoking status: Current Every Day Smoker     Packs/day: 0.10     Years: 40.00     Types: Cigarettes     Last attempt to quit: 10/31/2016     Smokeless tobacco: Never Used     Alcohol use No      Problem list and histories reviewed & adjusted, as indicated.  Additional history: as documented    Patient Active Problem List   Diagnosis     Hyperlipidemia LDL goal <100     ARAUZ (dyspnea on exertion)     COPD (chronic obstructive pulmonary disease) (H)     Edema     Fatigue     History of MI (myocardial infarction)     Snores     Knee pain, left     Bunion of great toe of left foot     Dystrophic nail     Arthritis     Peripheral vascular disease (H)     Chronic low back pain     Health Care Home     CKD (chronic kidney disease) stage 4, GFR 15-29 ml/min (H)     Abnormal gait     Advance Care Planning     Vitamin D deficiency     Constipation     Tobacco use disorder     Hypertension goal BP (blood pressure) < 130/80     Bradycardia     Callus of foot     Other chronic pain     Cataracts, bilateral     Hyperopic astigmatism of both eyes     Presbyopia     Asymptomatic bunion, right     Acquired hypothyroidism     Type 2 diabetes mellitus with diabetic chronic kidney disease (H)     Hypertension associated with diabetes (H)     Hyperlipidemia associated with type 2 diabetes mellitus (H)     Obesity (BMI 30-39.9)     History of sick sinus syndrome     Nephrotic syndrome     Pneumonia     Grief     Past Surgical History   Procedure Laterality Date     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     Appendectomy       Cholecystectomy       Hysteroscopic placement contraceptive device       Elected term pregnancy       Subclavian stent  august 2010     [facility]     Ovary surgery       left for cyst benign     Colonoscopy  7/15/2011     polyps repeat in 5 years     Blepharoplasty bilateral  9/18/2013     Procedure: BLEPHAROPLASTY BILATERAL;  BILATERAL UPPER EYELID BLEPHAROPLASTY ;   Surgeon: Olayinka Lyon MD;  Location: [facility]       Social History   Substance Use Topics     Smoking status: Current Every Day Smoker     Packs/day: 0.10     Years: 40.00     Types: Cigarettes     Last attempt to quit: 10/31/2016     Smokeless tobacco: Never Used     Alcohol use No     Family History   Problem Relation Age of Onset     DIABETES Mother      brother, MGM, sister     KIDNEY DISEASE Brother      X2 DM two      Alcohol/Drug Child      daughter     Asthma No family hx of      C.A.D. No family hx of      Hypertension No family hx of      CEREBROVASCULAR DISEASE No family hx of      Breast Cancer No family hx of      Cancer - colorectal No family hx of      Prostate Cancer No family hx of      Allergies No family hx of      Alzheimer Disease No family hx of      Anesthesia Reaction No family hx of      Arthritis No family hx of      Blood Disease No family hx of      CANCER No family hx of      Cardiovascular No family hx of      Circulatory No family hx of      Congenital Anomalies No family hx of      Connective Tissue Disorder No family hx of      Depression No family hx of      Eye Disorder No family hx of      Genetic Disorder No family hx of      GASTROINTESTINAL DISEASE No family hx of      Genitourinary Problems No family hx of      Gynecology No family hx of      HEART DISEASE No family hx of      Lipids No family hx of      Musculoskeletal Disorder No family hx of      Neurologic Disorder No family hx of      Obesity No family hx of      OSTEOPOROSIS No family hx of      Psychotic Disorder No family hx of      Respiratory No family hx of      Thyroid Disease No family hx of      Glaucoma No family hx of      Macular Degeneration No family hx of          Current Outpatient Prescriptions   Medication Sig Dispense Refill     insulin glargine (LANTUS) 100 UNIT/ML injection Inject 30 Units Subcutaneous every morning 15 mL 3     budesonide-formoterol (SYMBICORT) 80-4.5 MCG/ACT Inhaler Inhale 2  puffs into the lungs 2 times daily 1 Inhaler 1     atorvastatin (LIPITOR) 40 MG tablet Take 1 tablet (40 mg) by mouth daily 90 tablet 1     oxyCODONE (ROXICODONE) 10 MG IR tablet Take 1 tablet (10 mg) by mouth every 8 hours as needed 90 tablet 0     docusate sodium (COLACE) 100 MG capsule Take 1 capsule (100 mg) by mouth 2 times daily 60 capsule 4     glucose-vitamin C (DEX4 GLUCOSE) 4-0.006 G CHEW Take 1 tablet (4 g) by mouth every hour as needed for low blood sugar 50 tablet 3     losartan (COZAAR) 100 MG tablet Take 1 tablet (100 mg) by mouth daily 90 tablet 0     metoprolol (TOPROL-XL) 25 MG 24 hr tablet Take 1 tablet (25 mg) by mouth daily 90 tablet 1     albuterol (PROAIR HFA/PROVENTIL HFA/VENTOLIN HFA) 108 (90 BASE) MCG/ACT Inhaler Inhale 2 puffs into the lungs every 6 hours as needed for shortness of breath / dyspnea or wheezing 18 g 3     nystatin (MYCOSTATIN) 181117 UNIT/GM POWD Apply 1 g topically 3 times daily as needed 30 g 1     furosemide (LASIX) 20 MG tablet Take 1 tablet (20 mg) by mouth daily 60 tablet 11     Cholecalciferol (VITAMIN D) 2000 UNITS tablet Take 2,000 Units by mouth daily 60 tablet 2     Alcohol Swabs (SM ALCOHOL PREP) 70 % PADS        levothyroxine (SYNTHROID, LEVOTHROID) 200 MCG tablet Take 1 tablet (200 mcg) by mouth See Admin Instructions New daily increase. Recheck labs in 8 weeks. 90 tablet 1     insulin pen needle 31G X 6 MM Use as directed 120 each 3     ASPIRIN LOW DOSE 81 MG EC tablet Take 1 tablet (81 mg) by mouth daily 30 tablet 11     ferrous sulfate (IRON) 325 (65 FE) MG tablet Take 1 tablet (325 mg) by mouth daily (with breakfast) 100 tablet 1     blood glucose monitoring (FREESTYLE) lancets Test BS four times daily as directed 1 Box 12     blood glucose monitoring (NO BRAND SPECIFIED) test strip 1 strip by In Vitro route 2 times daily Freestyle Lite 2 Box 12     polyethylene glycol (MIRALAX) powder Take 17 g by mouth daily 510 g 2     nitroglycerin (NITROSTAT) 0.4 MG  SL tablet Place 1 tablet (0.4 mg) under the tongue every 5 minutes as needed for chest pain 25 tablet 0     Emollient (EUCERIN CALMING DAILY MOIST) CREA Externally apply 1 dose * topically daily 1 Tube 12     Allergies   Allergen Reactions     Contrast Dye Hives and Itching     Clonidine      She had as IP and thinks it made her itchy     Diatrizoate Other (See Comments)     Diltiazem      Severe bradycardia     Hydralazine      [place] tab patient thought made her itchy so stopped     Iodine-131      Recent Labs   Lab Test  01/27/17   0951  01/11/17   0946  12/13/16   1036   11/03/16   0545   11/02/16   0622  11/01/16   2335  10/11/16   0842  08/29/16   1652   12/09/15   0946   03/04/15   0944   12/20/13   1022   10/19/11   1111  08/19/11   0945   A1C   --    --   6.3*   --    --    --    --    --   7.2*  9.1*   < >  6.8*   < >  6.9*   < >  6.7*   < >   --   6.9*   LDL   --    --    --    --    --    --    --    --    --   85   --   72   --   99   < >  109   < >  85  109   HDL   --    --    --    --    --    --    --    --    --    --    --    --    --    --    --   43*   --   49*  49*   TRIG   --    --    --    --    --    --    --    --    --    --    --    --    --    --    --   331*   --   175*  254*   ALT   --    --    --    --    --    --   21  27   --   23   --   27   < >   --    < >   --    < >  14   --    CR  2.47*  2.20*   --    < >  2.39*   < >  2.64*  2.53*  2.24*  2.17*   < >  1.75*   < >  1.59*   < >   --    < >   --   1.20*   GFRESTIMATED  19*  22*   --    < >  20*   < >  18*  19*  22*  22*   < >  29*   < >  32*   < >   --    < >   --   45*   GFRESTBLACK  23*  27*   --    < >  24*   < >  22*  23*  26*  27*   < >  35*   < >  39*   < >   --    < >   --   55*   POTASSIUM  5.4*  5.2   --    < >  4.5   < >  5.6*  5.3  5.5*  5.2   < >  4.9   < >  5.1   < >   --    < >   --   4.2   TSH   --    --    --    --   1.28   --    --    --   17.25*  13.11*   --   1.24   < >   --    < >   --    < >  6.82*  36.00*     < > = values in this interval not displayed.      BP Readings from Last 3 Encounters:   02/13/17 158/81   01/27/17 102/74   01/18/17 118/81    Wt Readings from Last 3 Encounters:   02/13/17 196 lb (88.9 kg)   01/27/17 193 lb 4.8 oz (87.7 kg)   01/18/17 199 lb 8 oz (90.5 kg)            Labs reviewed in EPIC  Problem list, Medication list, Allergies, and Medical/Social/Surgical histories reviewed in [facility] and updated as appropriate.    ROS: 10 point ROS neg other than the symptoms noted above in the HPI.    This document serves as a record of the services and decisions personally performed and made by Mai Babcock MD. It was created on her behalf by Nicki Pop, a trained medical scribe. The creation of this document is based on the provider's statements to the medical scribe.  Nicki Ppo 12:59 PM 2/13/2017    OBJECTIVE:                                                    /81 (BP Location: Left arm, Patient Position: Chair, Cuff Size: Adult Large)  Pulse 68  Temp 98.6  F (37  C) (Oral)  Wt 196 lb (88.9 kg)  BMI 32.62 kg/m2  Body mass index is 32.62 kg/(m^2).  GENERAL:  Obese  female with glasses, healthy, alert and no distress  EYES: Eyes grossly normal to inspection, extraocular movements - intact, and PERRL  HENT: ear canals- normal; TMs- normal; Nose- normal; Mouth- no ulcers, no lesions  NECK: no tenderness, no adenopathy, no asymmetry, no masses, no stiffness; thyroid- normal to palpation  RESP: lungs clear to auscultation - no rales, no rhonchi, no wheezes  CV: regular rates and rhythm, normal S1 S2, no S3 or S4 and no murmur, no click or rub -no edema no casimiro/cords  ABDOMEN: soft, no tenderness, no  hepatosplenomegaly, no masses, normal bowel sounds  MS: extremities- no gross deformities noted, no edema  SKIN: no suspicious lesions, no rashes  NEURO: strength and tone- normal, sensory exam- grossly normal, mentation- intact, speech- normal, reflexes- symmetric  Non focal no aphasia. No  facial asymmetry. Finger to nose, rapid alteration, finger thumb opposition, heel knee shin are normal.  Diabetic foot exam: normal DP and PT pulses, no trophic changes or ulcerative lesions, normal sensory exam and thick toenails, bunions, and calloused feet. Declines referral to podiatry.   BACK: no CVA tenderness, no paralumbar tenderness  PSYCH: Alert and oriented times 3; speech- coherent , normal rate and volume; able to articulate logical thoughts, able to abstract reason, no tangential thoughts, no hallucinations or delusions, affect- normal  LYMPHATICS: ant. cervical- normal, post. cervical- normal, axillary- normal, supraclavicular- normal, inguinal- normal    Trace arthritis in hands bilaterally.     Expiratory wheeze anterior right lung - Cleared with coughing    Reviewed:  Key terms  XR LUMBAR SPINE 2-3 VIEWS 7/8/2014 10:43 AM   HISTORY: Lumbago   COMPARISON: None.   IMPRESSION: Multilevel advanced facet degenerative changes. Grade 1 degenerative spondylolisthesis at L4-L5. Multilevel mild degenerative disc disease. LATRICIA SEQUEIRA MD         ASSESSMENT/PLAN:                                                      (M54.5,  G89.29) Chronic low back pain without sciatica, unspecified back pain laterality  (primary encounter diagnosis)  Comment: Need for refill. Pain rating today: 6/10  Plan: oxyCODONE (ROXICODONE) 10 MG IR tablet          (I25.2) History of MI (myocardial infarction)  Comment: Patient noted a left chest area burning sensation lasting 1 minute recently.  Plan: Continue current treatment plan    (J44.9) Chronic obstructive pulmonary disease, unspecified COPD type (H)  Comment: Patient using her albuterol 2-3 times per day.   Plan: Continue current treatment plan    (E11.22,  N18.4,  Z79.4) Type 2 diabetes mellitus with stage 4 chronic kidney disease, with long-term current use of insulin (H)  Comment: Need for lab and referral  Plan: OPHTHALMOLOGY ADULT REFERRAL, Renal panel (Alb,        BUN, Ca,  Cl, CO2, Creat, Gluc, Phos, K, Na)          (E11.59,  I10) Hypertension associated with diabetes (H)  Comment:   BP Readings from Last 3 Encounters:   02/13/17 158/81   01/27/17 102/74   01/18/17 118/81   Plan: Continue current treatment plan    (I10) Hypertension goal BP (blood pressure) < 130/80    (E11.69,  E78.5) Hyperlipidemia associated with type 2 diabetes mellitus (H)    (E78.5) Hyperlipidemia LDL goal <100    (Z86.79) History of sick sinus syndrome    (G89.29) Other chronic pain  Comment: Overall pain rating of 6/10 today. Best is a 2-4/10  Plan: Continue current treatment plan    (Z13.820) Screening for osteoporosis  Comment: Need for screening  Plan: DX Hip/Pelvis/Spine          Patient Instructions:  Follow up with Dr GRIGGS in 1 month.   Eye referral sent  Oxycodone refill today.   Renal Panel lab to check Potassium Today.   The information in this document, created by the medical scribe, Nicki Pop, for me, accurately reflects the services I personally performed and the decisions made by me. I have reviewed and approved this document for accuracy prior to leaving the patient care area.    Mai Babcock MD  [facility]  40 min spent in direct face to face time with this pt discussing diabetes, pain, medications, and others described above, greater than 50% in counseling and coordination of care.

## 2017-02-09 ENCOUNTER — CARE COORDINATION (OUTPATIENT)
Dept: GERIATRIC MEDICINE | Facility: CLINIC | Age: 72
End: 2017-02-09

## 2017-02-09 NOTE — PROGRESS NOTES
Per Sofi, arranged transportation thru [facility] for the below appt:  Appt Date: 2/13 at 10:30am  Clinic Name:  [facility], [address], [place]  Transportation Provider: [employer]   time:  9:30am    Attempted to notify member of  time but no answer.  Shakila Fountain  Case Management Specialist  [facility]  [phone]

## 2017-02-13 ENCOUNTER — OFFICE VISIT (OUTPATIENT)
Dept: FAMILY MEDICINE | Facility: CLINIC | Age: 72
End: 2017-02-13
Payer: MEDICARE

## 2017-02-13 VITALS
TEMPERATURE: 98.6 F | SYSTOLIC BLOOD PRESSURE: 158 MMHG | WEIGHT: 196 LBS | BODY MASS INDEX: 32.62 KG/M2 | DIASTOLIC BLOOD PRESSURE: 81 MMHG | HEART RATE: 68 BPM

## 2017-02-13 DIAGNOSIS — Z86.79 HISTORY OF SICK SINUS SYNDROME: ICD-10-CM

## 2017-02-13 DIAGNOSIS — G89.29 OTHER CHRONIC PAIN: ICD-10-CM

## 2017-02-13 DIAGNOSIS — I10 HYPERTENSION GOAL BP (BLOOD PRESSURE) < 130/80: ICD-10-CM

## 2017-02-13 DIAGNOSIS — Z78.0 ASYMPTOMATIC MENOPAUSAL STATE: ICD-10-CM

## 2017-02-13 DIAGNOSIS — E11.59 HYPERTENSION ASSOCIATED WITH DIABETES (H): ICD-10-CM

## 2017-02-13 DIAGNOSIS — E11.22 TYPE 2 DIABETES MELLITUS WITH STAGE 4 CHRONIC KIDNEY DISEASE, WITH LONG-TERM CURRENT USE OF INSULIN (H): ICD-10-CM

## 2017-02-13 DIAGNOSIS — I15.2 HYPERTENSION ASSOCIATED WITH DIABETES (H): ICD-10-CM

## 2017-02-13 DIAGNOSIS — E11.69 HYPERLIPIDEMIA ASSOCIATED WITH TYPE 2 DIABETES MELLITUS (H): ICD-10-CM

## 2017-02-13 DIAGNOSIS — Z79.4 TYPE 2 DIABETES MELLITUS WITH STAGE 4 CHRONIC KIDNEY DISEASE, WITH LONG-TERM CURRENT USE OF INSULIN (H): ICD-10-CM

## 2017-02-13 DIAGNOSIS — I25.2 HISTORY OF MI (MYOCARDIAL INFARCTION): ICD-10-CM

## 2017-02-13 DIAGNOSIS — M54.50 CHRONIC LOW BACK PAIN WITHOUT SCIATICA, UNSPECIFIED BACK PAIN LATERALITY: Primary | ICD-10-CM

## 2017-02-13 DIAGNOSIS — J44.9 CHRONIC OBSTRUCTIVE PULMONARY DISEASE, UNSPECIFIED COPD TYPE (H): ICD-10-CM

## 2017-02-13 DIAGNOSIS — G89.29 CHRONIC LOW BACK PAIN WITHOUT SCIATICA, UNSPECIFIED BACK PAIN LATERALITY: Primary | ICD-10-CM

## 2017-02-13 DIAGNOSIS — N18.4 TYPE 2 DIABETES MELLITUS WITH STAGE 4 CHRONIC KIDNEY DISEASE, WITH LONG-TERM CURRENT USE OF INSULIN (H): ICD-10-CM

## 2017-02-13 DIAGNOSIS — E78.5 HYPERLIPIDEMIA LDL GOAL <100: ICD-10-CM

## 2017-02-13 DIAGNOSIS — Z13.820 SCREENING FOR OSTEOPOROSIS: ICD-10-CM

## 2017-02-13 DIAGNOSIS — E78.5 HYPERLIPIDEMIA ASSOCIATED WITH TYPE 2 DIABETES MELLITUS (H): ICD-10-CM

## 2017-02-13 PROCEDURE — 36415 COLL VENOUS BLD VENIPUNCTURE: CPT | Performed by: FAMILY MEDICINE

## 2017-02-13 PROCEDURE — 80069 RENAL FUNCTION PANEL: CPT | Performed by: FAMILY MEDICINE

## 2017-02-13 PROCEDURE — 99215 OFFICE O/P EST HI 40 MIN: CPT | Performed by: FAMILY MEDICINE

## 2017-02-13 RX ORDER — OXYCODONE HYDROCHLORIDE 10 MG/1
10 TABLET ORAL EVERY 8 HOURS PRN
Qty: 90 TABLET | Refills: 0 | Status: SHIPPED | OUTPATIENT
Start: 2017-02-13 | End: 2017-03-10

## 2017-02-13 ASSESSMENT — PAIN SCALES - GENERAL: PAINLEVEL: SEVERE PAIN (6)

## 2017-02-13 NOTE — LETTER
[facility]    02/13/17    Patient: Kim Anne  YOB: 1945  Medical Record Number: [id number]                                                                  Controlled Substance Agreement  I understand that my care provider has prescribed controlled substances (narcotics, tranquilizers, and/or stimulants) to help manage my condition(s).  I am taking this medicine to help me function or work.  I know that this is strong medicine.  It could have serious side effects and even cause a dependency on the drug.  If I stop these medicines suddenly, I could have severe withdrawal symptoms.    The risks, benefits, and side effects of these medication(s) were explained to me.  I agree that:  1. I will take part in other treatments as advised by my provider.  This may be psychiatry or counseling, physical therapy, behavioral therapy, group treatment, or a referral to a pain clinic.  I will reduce or stop my medicine when my provider tells me to do so.   2. I will take my medicines as prescribed.  I will not change the dose or schedule unless my provider tells me to.  There will be no refills if I  run out early.   I may be contacted at any time without warning and asked to complete a drug test or pill count.   3. I will keep all my appointments at the clinic.  If I miss appointments or fail to follow instructions, my provider may stop my medicine.  4. I will not ask other providers to prescribe controlled substances. And I will not accept controlled substances from other people. If I need another prescribed controlled substance for a new reason, I will notify my provider within one business day.  5. If I enroll in the [place] Medical Marijuana program, I will tell my provider.  I will also sign an agreement to share my medical records with my provider.  6. I will use one pharmacy to fill all of my controlled substance prescriptions.  If my prescription is mailed to my  pharmacy, it may take 5 to 7 days for my medicine to be ready.  7. I understand that my provider, clinic care team, and pharmacy can track controlled substance prescriptions from other providers through a central database (prescription monitoring program).  8. I will bring in my list of medications (or my medicine bottles) each time I come to the clinic.  REV-  04/2016                                                                                                                                            Page 1 of 2      [facility]    02/13/17    Patient: Kim Anne  YOB: 1945  Medical Record Number: [id number]    9. Refills of controlled substances will be made only during office hours.  It is up to me to make sure that I do not run out of my medicines on weekends or holidays.    10. I am responsible for my prescriptions.  If the medicine is lost or stolen, it will not be replaced.   I also agree not to share these medicines with anyone.  11. I agree to not use ANY illegal or recreational drugs.  This includes marijuana, cocaine, bath salts or other drugs.  I agree not to use alcohol unless my provider says I may.  I agree to give urine samples whenever asked.  If I fail to give a urine sample, the provider may stop my medicine.     12. I will tell my nurse or provider right away if I become pregnant or have a new medical problem treated outside of [facility].  13. I understand that this medicine can affect my thinking and judgment.  It may be unsafe for me to drive, use machinery and do dangerous tasks.  I will not do any of these things until I know how the medicine affects me.  If my dose changes, I will wait to see how it affects me.  I will contact my provider if I have concerns about medicine side effects.  I understand that if I do not follow any of the conditions above, my prescriptions or treatment may be stopped.    I agree that my provider,  clinic care team, and pharmacy may work with any city, state or federal law enforcement agency that investigates the misuse, sale, or other diversion of my controlled medicine. I will allow my provider to discuss my care with or share a copy of this agreement with any other treating provider, pharmacy or emergency room where I receive care.  I agree to give up (waive) any right of privacy or confidentiality with respect to these authorizations.   I have read this agreement and have asked questions about anything I did not understand.   ___________________________________    ___________________________  Patient Signature                                                           Date and Time  ___________________________________     ____________________________  Witness                                                                            Date and Time  ___________________________________  Mai Babcock MD  REV-  04/2016                                                                                                                                                                 Page 2 of 2  {Controlled Substance Agreement (CSA) Patient Instructions:[id number]}

## 2017-02-13 NOTE — MR AVS SNAPSHOT
After Visit Summary   2/13/2017    Kim Anne    MRN: [id number]           Patient Information     Date Of Birth          1945        Visit Information        Provider Department      2/13/2017 10:30 AM Mai Babcock MD [facility]        Today's Diagnoses     Chronic low back pain without sciatica, unspecified back pain laterality          Care Instructions    Follow up with Dr GRIGGS in 1 month.     Eye referral sent    Oxycodone refill today.     Renal Panel lab to check Potassium Today.         Follow-ups after your visit        Your next 10 appointments already scheduled     Feb 17, 2017 11:00 AM CST   SHORT with Pratibha Barth   [facility] ([facility])    [address] [place] [phone]            Feb 23, 2017  1:00 PM CST   (Arrive by 12:45 PM)   Return Visit with Emily Cordova MD   [facility] ([facility])    [address] [place] [address] [id number] [phone]            Apr 21, 2017  8:45 AM CDT   Lab with [facility]   [facility] ([facility])    [address] [place] [address] [id number] [phone]            Apr 21, 2017  9:40 AM CDT   (Arrive by 9:10 AM)   Return Visit with Bhargav Lopes MD   [facility] ([facility])    [address] [place] [address] [id number] [phone]              Who to contact     If you have questions or need follow up information about today's clinic visit or your schedule please contact [facility] directly at [phone].  Normal or non-critical lab and imaging results will be communicated to you by MyChart, letter or phone within 4 business days after the clinic has received the results. If you do  "not hear from us within 7 days, please contact the clinic through [employer] or phone. If you have a critical or abnormal lab result, we will notify you by phone as soon as possible.  Submit refill requests through [employer] or call your pharmacy and they will forward the refill request to us. Please allow 3 business days for your refill to be completed.          Additional Information About Your Visit        [employer] Information     [employer] lets you send messages to your doctor, view your test results, renew your prescriptions, schedule appointments and more. To sign up, go to [email or web address] . Click on \"Log in\" on the left side of the screen, which will take you to the Welcome page. Then click on \"Sign up Now\" on the right side of the page.     You will be asked to enter the access code listed below, as well as some personal information. Please follow the directions to create your username and password.     Your access code is: [email or web address]  Expires: 2017 10:47 AM     Your access code will  in 90 days. If you need help or a new code, please call your [facility] clinic or [phone].        Care EveryWhere ID     This is your Care EveryWhere ID. This could be used by other organizations to access your [facility] medical records  [id number]        Your Vitals Were     Pulse Temperature BMI (Body Mass Index)             68 98.6  F (37  C) (Oral) 32.62 kg/m2          Blood Pressure from Last 3 Encounters:   17 158/81   17 102/74   17 118/81    Weight from Last 3 Encounters:   17 196 lb (88.9 kg)   17 193 lb 4.8 oz (87.7 kg)   17 199 lb 8 oz (90.5 kg)              Today, you had the following     No orders found for display       Primary Care Provider Office Phone # Fax #    Mai Babcock [phone]       [facility] [address] [facility] [address]  [facility] [address]        Thank you!     Thank you for choosing [facility] " PRIMARY CARE  for your care. Our goal is always to provide you with excellent care. Hearing back from our patients is one way we can continue to improve our services. Please take a few minutes to complete the written survey that you may receive in the mail after your visit with us. Thank you!             Your Updated Medication List - Protect others around you: Learn how to safely use, store and throw away your medicines at www.disposemymeds.org.          This list is accurate as of: 2/13/17 10:48 AM.  Always use your most recent med list.                   Brand Name Dispense Instructions for use    albuterol 108 (90 BASE) MCG/ACT Inhaler    PROAIR HFA/PROVENTIL HFA/VENTOLIN HFA    18 g    Inhale 2 puffs into the lungs every 6 hours as needed for shortness of breath / dyspnea or wheezing       ASPIRIN LOW DOSE 81 MG EC tablet   Generic drug:  aspirin     30 tablet    Take 1 tablet (81 mg) by mouth daily       atorvastatin 40 MG tablet    LIPITOR    90 tablet    Take 1 tablet (40 mg) by mouth daily       blood glucose monitoring lancets     1 Box    Test BS four times daily as directed       blood glucose monitoring test strip    no brand specified    2 Box    1 strip by In Vitro route 2 times daily Freestyle Lite       budesonide-formoterol 80-4.5 MCG/ACT Inhaler    SYMBICORT    1 Inhaler    Inhale 2 puffs into the lungs 2 times daily       docusate sodium 100 MG capsule    COLACE    60 capsule    Take 1 capsule (100 mg) by mouth 2 times daily       EUCERIN CALMING DAILY MOIST Crea     1 Tube    Externally apply 1 dose * topically daily       ferrous sulfate 325 (65 FE) MG tablet    IRON    100 tablet    Take 1 tablet (325 mg) by mouth daily (with breakfast)       furosemide 20 MG tablet    LASIX    60 tablet    Take 1 tablet (20 mg) by mouth daily       glucose-vitamin C 4-0.006 G Chew    DEX4 GLUCOSE    50 tablet    Take 1 tablet (4 g) by mouth every hour as needed for low blood sugar       insulin glargine 100  UNIT/ML injection    LANTUS    15 mL    Inject 30 Units Subcutaneous every morning       insulin pen needle 31G X 6 MM     120 each    Use as directed       levothyroxine 200 MCG tablet    SYNTHROID/LEVOTHROID    90 tablet    Take 1 tablet (200 mcg) by mouth See Admin Instructions New daily increase. Recheck labs in 8 weeks.       losartan 100 MG tablet    COZAAR    90 tablet    Take 1 tablet (100 mg) by mouth daily       metoprolol 25 MG 24 hr tablet    TOPROL-XL    90 tablet    Take 1 tablet (25 mg) by mouth daily       nitroglycerin 0.4 MG sublingual tablet    NITROSTAT    25 tablet    Place 1 tablet (0.4 mg) under the tongue every 5 minutes as needed for chest pain       nystatin 978102 UNIT/GM Powd    MYCOSTATIN    30 g    Apply 1 g topically 3 times daily as needed       oxyCODONE 10 MG IR tablet    ROXICODONE    90 tablet    Take 1 tablet (10 mg) by mouth every 8 hours as needed       polyethylene glycol powder    MIRALAX    510 g    Take 17 g by mouth daily       SM ALCOHOL PREP 70 % Pads          vitamin D 2000 UNITS tablet     60 tablet    Take 2,000 Units by mouth daily

## 2017-02-13 NOTE — Clinical Note
Patient needs help in the house. Nephew used to help her and is currently in a coma and being transferred to [facility].

## 2017-02-13 NOTE — PATIENT INSTRUCTIONS
Follow up with Dr GRIGGS in 1 month.     Eye referral sent    Oxycodone refill today.     Renal Panel lab to check Potassium Today.

## 2017-02-13 NOTE — LETTER
[facility]  [facility]  [address] [place] [address], [address]  [place]   [address]  Telephone:  [phone]              February 14, 2017      Kim Anne  [address] [facility] [address]              Dear Kim,    Results for orders placed or performed in visit on 02/13/17   Renal panel (Alb, BUN, Ca, Cl, CO2, Creat, Gluc, Phos, K, Na)   Result Value Ref Range    Sodium 142 133 - 144 mmol/L    Potassium 5.8 (H) 3.4 - 5.3 mmol/L    Chloride 115 (H) 94 - 109 mmol/L    Carbon Dioxide 21 20 - 32 mmol/L    Anion Gap 6 3 - 14 mmol/L    Glucose 189 (H) 70 - 99 mg/dL    Urea Nitrogen 27 7 - 30 mg/dL    Creatinine 2.62 (H) 0.52 - 1.04 mg/dL    GFR Estimate 18 (L) >60 mL/min/1.7m2    GFR Estimate If Black 22 (L) >60 mL/min/1.7m2    Calcium 8.3 (L) 8.5 - 10.1 mg/dL    Phosphorus 3.8 2.5 - 4.5 mg/dL    Albumin 2.0 (L) 3.4 - 5.0 g/dL     Slightly worsened kidney tests.          Sincerely,      Mai Babcock MD, MEd

## 2017-02-13 NOTE — NURSING NOTE
"Chief Complaint   Patient presents with     Lipids     Diabetes     Hypertension       Initial /81 (BP Location: Left arm, Patient Position: Chair, Cuff Size: Adult Large)  Pulse 68  Temp 98.6  F (37  C) (Oral)  Wt 196 lb (88.9 kg)  BMI 32.62 kg/m2 Estimated body mass index is 32.62 kg/(m^2) as calculated from the following:    Height as of 1/27/17: 5' 5\" (1.651 m).    Weight as of this encounter: 196 lb (88.9 kg).  Medication Reconciliation: complete   Anibal Anne MA      "

## 2017-02-14 ENCOUNTER — CARE COORDINATION (OUTPATIENT)
Dept: GERIATRIC MEDICINE | Facility: CLINIC | Age: 72
End: 2017-02-14

## 2017-02-14 LAB
ALBUMIN SERPL-MCNC: 2 G/DL (ref 3.4–5)
ANION GAP SERPL CALCULATED.3IONS-SCNC: 6 MMOL/L (ref 3–14)
BUN SERPL-MCNC: 27 MG/DL (ref 7–30)
CALCIUM SERPL-MCNC: 8.3 MG/DL (ref 8.5–10.1)
CHLORIDE SERPL-SCNC: 115 MMOL/L (ref 94–109)
CO2 SERPL-SCNC: 21 MMOL/L (ref 20–32)
CREAT SERPL-MCNC: 2.62 MG/DL (ref 0.52–1.04)
GFR SERPL CREATININE-BSD FRML MDRD: 18 ML/MIN/1.7M2
GLUCOSE SERPL-MCNC: 189 MG/DL (ref 70–99)
PHOSPHATE SERPL-MCNC: 3.8 MG/DL (ref 2.5–4.5)
POTASSIUM SERPL-SCNC: 5.8 MMOL/L (ref 3.4–5.3)
SODIUM SERPL-SCNC: 142 MMOL/L (ref 133–144)

## 2017-02-14 NOTE — PROGRESS NOTES
Per Sarah, arranged transportation thru [facility] for the below appt:  Appt Date: 2/17 at 11am  Clinic Name:   [place], [address], [place]  Transportation Provider: [employer]   time:  10-10:30am    Notified member of  time.  Shakila Fountain  Case Management Specialist  [facility]  [phone]

## 2017-02-15 ASSESSMENT — PATIENT HEALTH QUESTIONNAIRE - PHQ9: SUM OF ALL RESPONSES TO PHQ QUESTIONS 1-9: 1

## 2017-02-17 ENCOUNTER — CARE COORDINATION (OUTPATIENT)
Dept: GERIATRIC MEDICINE | Facility: CLINIC | Age: 72
End: 2017-02-17

## 2017-02-17 ENCOUNTER — OFFICE VISIT (OUTPATIENT)
Dept: PHARMACY | Facility: CLINIC | Age: 72
End: 2017-02-17
Payer: COMMERCIAL

## 2017-02-17 VITALS — DIASTOLIC BLOOD PRESSURE: 66 MMHG | HEART RATE: 67 BPM | OXYGEN SATURATION: 96 % | SYSTOLIC BLOOD PRESSURE: 103 MMHG

## 2017-02-17 DIAGNOSIS — N18.4 CONTROLLED TYPE 2 DIABETES MELLITUS WITH STAGE 4 CHRONIC KIDNEY DISEASE, WITH LONG-TERM CURRENT USE OF INSULIN (H): ICD-10-CM

## 2017-02-17 DIAGNOSIS — E11.22 CONTROLLED TYPE 2 DIABETES MELLITUS WITH STAGE 4 CHRONIC KIDNEY DISEASE, WITH LONG-TERM CURRENT USE OF INSULIN (H): ICD-10-CM

## 2017-02-17 DIAGNOSIS — I25.10 CORONARY ARTERY DISEASE INVOLVING NATIVE CORONARY ARTERY OF NATIVE HEART WITHOUT ANGINA PECTORIS: ICD-10-CM

## 2017-02-17 DIAGNOSIS — E11.59 HYPERTENSION ASSOCIATED WITH DIABETES (H): Primary | ICD-10-CM

## 2017-02-17 DIAGNOSIS — Z79.4 CONTROLLED TYPE 2 DIABETES MELLITUS WITH STAGE 4 CHRONIC KIDNEY DISEASE, WITH LONG-TERM CURRENT USE OF INSULIN (H): ICD-10-CM

## 2017-02-17 DIAGNOSIS — J44.9 CHRONIC OBSTRUCTIVE PULMONARY DISEASE, UNSPECIFIED COPD TYPE (H): ICD-10-CM

## 2017-02-17 DIAGNOSIS — I15.2 HYPERTENSION ASSOCIATED WITH DIABETES (H): Primary | ICD-10-CM

## 2017-02-17 PROCEDURE — 99606 MTMS BY PHARM EST 15 MIN: CPT | Mod: GY | Performed by: PHARMACIST

## 2017-02-17 PROCEDURE — 99607 MTMS BY PHARM ADDL 15 MIN: CPT | Mod: GY | Performed by: PHARMACIST

## 2017-02-17 NOTE — PROGRESS NOTES
SUBJECTIVE/OBJECTIVE:                                                    Kim Anne is a 71 year old female coming in for a follow-up visit for Medication Therapy Management.  She was referred to me from Mai Babcock     Chief Complaint: HTN follow-up. Last MTM visit on 1/18/16.    Social: Son is on life support. He did a lot of things around the house (asting sort of like a PCA)    Medication Adherence: history of med compliance issues - says that things have improved since she started using the pill boxes.     Hypertension/CAD: Current medications include furosemide 20mg once per day, losartan 100mg daily and metoprolol succinate 25mg daily. Amlodipine stopped by Dr. Lopes, however pt isn't sure if she has stopped it - confused with with the difference between amlodipine and losartan. Patient does not self-monitor BP. Also has NTG SL on-hand for chest pain, used this last month - was stressed from the situation with her son. Patient reports no current medication side effects.    Diabetes:  Pt currently taking Lantus 30 units daily. Also has glucose tablets on-hand for hypoglycemia, though she usually just drinks juice when her BGs are low. Pt is experiencing the following side effects:  none  SMBG: two times daily.   Ranges (patient reported): 59-265mg/dL  Symptoms of low blood sugar? Shakiness, confusion. Frequency of hypoglycemia? Rare since dose decrease of the Lantus. Only 1 below 70mg/dL.   Recent symptoms of high blood sugar? none  Eye exam: up to date  Foot exam: up to date  Microalbumin is not < 30 mg/g. Pt is taking an ACEi/ARB. Due for recheck.  Aspirin: Taking 81mg daily and denies side effects    Hyperlipidemia: Current therapy includes atorvastatin 40mg once daily.  Pt reports no significant myalgias or other side effects.     COPD:  Current medications: Albuterol MDI and Budesonide+Formoterol (Symbicort) more frequently. Expresses better understanding of her albuterol. Pt rinses  their mouth after using steroid inhaler.   Pt reports the following symptoms: none.     Current labs include:    BP Readings from Last 3 Encounters:   02/13/17 158/81   01/27/17 102/74   01/18/17 118/81     Lab Results   Component Value Date    A1C 6.3 12/13/2016    A1C 7.2 10/11/2016    A1C 9.1 08/29/2016    A1C 7.2 06/30/2016    A1C 6.8 12/09/2015     Recent Labs   Lab Test  08/29/16   1652  12/09/15   0946   12/20/13   1022   10/19/11   1111   CHOL   --    --    --   219*   --   169   HDL   --    --    --   43*   --   49*   LDL  85  72   < >  109   < >  85   TRIG   --    --    --   331*   --   175*   CHOLHDLRATIO   --    --    --   5.0   --   3.0    < > = values in this interval not displayed.       Lab Results   Component Value Date    MICROL 49637 06/30/2016     No results found for: MICROALBUMIN    Last Basic Metabolic Panel:  Lab Results   Component Value Date     02/13/2017      Lab Results   Component Value Date    POTASSIUM 5.8 02/13/2017     Lab Results   Component Value Date    CHLORIDE 115 02/13/2017     Lab Results   Component Value Date    FRANCES 8.3 02/13/2017     Lab Results   Component Value Date    CO2 21 02/13/2017     Lab Results   Component Value Date    BUN 27 02/13/2017     Lab Results   Component Value Date    CR 2.62 02/13/2017     Lab Results   Component Value Date     02/13/2017     GFR Estimate   Date Value Ref Range Status   02/13/2017 18 (L) >60 mL/min/1.7m2 Final     Comment:     Non  GFR Calc   01/27/2017 19 (L) >60 mL/min/1.7m2 Final     Comment:     Non  GFR Calc   01/11/2017 22 (L) >60 mL/min/1.7m2 Final     Comment:     Non  GFR Calc     GFR Estimate If Black   Date Value Ref Range Status   02/13/2017 22 (L) >60 mL/min/1.7m2 Final     Comment:      GFR Calc   01/27/2017 23 (L) >60 mL/min/1.7m2 Final     Comment:      GFR Calc   01/11/2017 27 (L) >60 mL/min/1.7m2 Final     Comment:       GFR Calc     TSH   Date Value Ref Range Status   11/03/2016 1.28 0.40 - 4.00 mU/L Final     T4 Free   Date Value Ref Range Status   10/11/2016 0.87 0.76 - 1.46 ng/dL Final     Wt Readings from Last 2 Encounters:   02/13/17 196 lb (88.9 kg)   01/27/17 193 lb 4.8 oz (87.7 kg)     Most Recent Immunizations   Administered Date(s) Administered     Hepatitis B 06/28/2012     Influenza (High Dose) 3 valent vaccine 10/11/2016     Influenza (IIV3) 11/04/2014     Pneumococcal (PCV 13) 01/05/2015     Pneumococcal 23 valent 04/01/2011     TD (ADULT, 7+) 07/12/2007     TDAP (ADACEL AGES 11-64) 05/18/2011     Twinrix A/B 03/21/2012     Zoster vaccine, live 04/30/2012     ASSESSMENT:                                                    Current medications were reviewed with her today.      Medication Adherence: Improving per pt report.     Hypertension/CAD: BP at goal of BP <140/90 today on her left arm, however concerning that her right arm BP is quite elevated. Discussed with PCP, and agree that she should establish with cardiology.    Diabetes: Stable. Patient is meeting A1c goal of < 7%. Hypoglycemic issues have improved, pt would prefer to stay on this dose of Lantus.     Hyperlipidemia: Stable. Pt is on high intensity statin which is indicated based on 2013 ACC/AHA guidelines for lipid management. Stressed that she should be taking atorvastatin.    COPD:  Continued to stress compliance with Symbicort and albuterol technique.       PLAN:                                                      1. Sent referral to [facility] for getting a PCA set up.   2. Referral for cardiology entered via verbal approval with PCP.   3. Clarified pt should be taking atorvastatin and should not be taking amlodipine.     I spent 30 minutes with this patient today.  All changes were made via collaborative practice agreement with Mai Babcock. A copy of the visit note was provided to the patient's primary care provider.     Will follow  up in 1 month.     The patient was given a summary of these recommendations as an after visit summary.    Pratibha Barth, PharmD  Medication Therapy Management Pharmacist  Pager: [phone]

## 2017-02-17 NOTE — PATIENT INSTRUCTIONS
Recommendations from today's MTM visit:                                                      1. Dr. Lopes (your kidney doctor) stopped the amlodipine (Norvasc). Make sure that you stopped this.     2. Make sure that you started the atorvastatin (Lipitor) for your cholesterol.     3. I will have Nikia call you to start the PCA process.     Next MTM visit: 4/14 at 10:30    To schedule another MTM appointment, please call the clinic directly or you may call the MTM scheduling line at [phone] or toll-free at [phone].     My Clinical Pharmacist's contact information:                                                      It was a pleasure seeing you today!  Please feel free to contact me with any questions or concerns you have.      Pratibha Barth, PharmD  Medication Therapy Management Pharmacist  Pager: [phone]    You may receive a survey about the MTM services you received.  I would appreciate your feedback to help me serve you better in the future. Please fill it out and return it when you can. Your comments will be anonymous.

## 2017-02-17 NOTE — MR AVS SNAPSHOT
After Visit Summary   2/17/2017    Kim Anne    MRN: [id number]           Patient Information     Date Of Birth          1945        Visit Information        Provider Department      2/17/2017 11:00 AM Pratibha Barth [facility] MTM        Care Instructions    Recommendations from today's MTM visit:                                                      1. Dr. Lopes (your kidney doctor) stopped the amlodipine (Norvasc). Make sure that you stopped this.     2. Make sure that you started the atorvastatin (Lipitor) for your cholesterol.     3. I will have Nikia call you to start the PCA process.     Next MTM visit: 4/14 at 10:30    To schedule another MTM appointment, please call the clinic directly or you may call the MTM scheduling line at [phone] or toll-free at [phone].     My Clinical Pharmacist's contact information:                                                      It was a pleasure seeing you today!  Please feel free to contact me with any questions or concerns you have.      Pratibha Barth, PharmD  Medication Therapy Management Pharmacist  Pager: [phone]    You may receive a survey about the MTM services you received.  I would appreciate your feedback to help me serve you better in the future. Please fill it out and return it when you can. Your comments will be anonymous.                  Follow-ups after your visit        Your next 10 appointments already scheduled     Feb 17, 2017 11:00 AM CST   SHORT with Pratibha Barth   [facility] ([facility])    [address] [place] [id number] [phone]            Feb 20, 2017 10:30 AM CST   Return Visit with NICKI FLOAT NURSE   [facility] ([facility])    [address] [place] [id number]    [phone]            Feb 23, 2017  1:00 PM CST   (Arrive by 12:45 PM)   Return Visit with Emily Cordova MD   [facility] ([facility])    [address] [place] [address]  3rd [facility] [address]   [phone]            Mar 10, 2017  9:00 AM CST   Return Visit with Mai Babcock MD   [facility] ([facility])    [address] [place] [address]  [address] [place] [phone]            Apr 14, 2017 10:30 AM CDT   SHORT with Pratibha Barth   [facility] ([facility])    [address] [place] [address]  [address] [place] [phone]            Apr 21, 2017  8:45 AM CDT   Lab with  LAB   [facility] Lab ([facility])    [address] [place] [phone] [phone]            Apr 21, 2017  9:40 AM CDT   (Arrive by 9:10 AM)   Return Visit with Bhargav Lopes MD   [facility] Nephrology ([facility])    [address] [place] [phone]              Who to contact     If you have questions or need follow up information about today's clinic visit or your schedule please contact [facility] directly at [phone].  Normal or non-critical lab and imaging results will be communicated to you by MyChart, letter or phone within 4 business days after the clinic has received the results. If you do not hear from us within 7 days, please contact the clinic through MyChart or phone. If you have a critical or abnormal lab result, we will notify you by phone as soon as possible.  Submit refill requests through [employer] or call your pharmacy and they will forward the refill request to us. Please allow 3 business days for your refill to be completed.          Additional  "Information About Your Visit        MyChart Information     [employer] lets you send messages to your doctor, view your test results, renew your prescriptions, schedule appointments and more. To sign up, go to [email or web address] . Click on \"Log in\" on the left side of the screen, which will take you to the Welcome page. Then click on \"Sign up Now\" on the right side of the page.     You will be asked to enter the access code listed below, as well as some personal information. Please follow the directions to create your username and password.     Your access code is: [email or web address]  Expires: 2017 10:47 AM     Your access code will  in 90 days. If you need help or a new code, please call your [place] clinic or [phone].        Care EveryWhere ID     This is your Care EveryWhere ID. This could be used by other organizations to access your [place] medical records  [id number]        Your Vitals Were     Pulse Pulse Oximetry                67 96%           Blood Pressure from Last 3 Encounters:   17 103/66   17 158/81   17 102/74    Weight from Last 3 Encounters:   17 196 lb (88.9 kg)   17 193 lb 4.8 oz (87.7 kg)   17 199 lb 8 oz (90.5 kg)              Today, you had the following     No orders found for display       Primary Care Provider Office Phone # Fax #    Mai Babcock [phone]       [address] [address]        Thank you!     Thank you for choosing [facility]  for your care. Our goal is always to provide you with excellent care. Hearing back from our patients is one way we can continue to improve our services. Please take a few minutes to complete the written survey that you may receive in the mail after your visit with us. Thank you!             Your Updated Medication List - Protect others around you: Learn how to safely use, store and throw away your " medicines at www.disposemymeds.org.          This list is accurate as of: 2/17/17 10:51 AM.  Always use your most recent med list.                   Brand Name Dispense Instructions for use    albuterol 108 (90 BASE) MCG/ACT Inhaler    PROAIR HFA/PROVENTIL HFA/VENTOLIN HFA    18 g    Inhale 2 puffs into the lungs every 6 hours as needed for shortness of breath / dyspnea or wheezing       ASPIRIN LOW DOSE 81 MG EC tablet   Generic drug:  aspirin     30 tablet    Take 1 tablet (81 mg) by mouth daily       atorvastatin 40 MG tablet    LIPITOR    90 tablet    Take 1 tablet (40 mg) by mouth daily       blood glucose monitoring lancets     1 Box    Test BS four times daily as directed       blood glucose monitoring test strip    no brand specified    2 Box    1 strip by In Vitro route 2 times daily Freestyle Lite       budesonide-formoterol 80-4.5 MCG/ACT Inhaler    SYMBICORT    1 Inhaler    Inhale 2 puffs into the lungs 2 times daily       docusate sodium 100 MG capsule    COLACE    60 capsule    Take 1 capsule (100 mg) by mouth 2 times daily       EUCERIN CALMING DAILY MOIST Crea     1 Tube    Externally apply 1 dose * topically daily       ferrous sulfate 325 (65 FE) MG tablet    IRON    100 tablet    Take 1 tablet (325 mg) by mouth daily (with breakfast)       furosemide 20 MG tablet    LASIX    60 tablet    Take 1 tablet (20 mg) by mouth daily       glucose-vitamin C 4-0.006 G Chew    DEX4 GLUCOSE    50 tablet    Take 1 tablet (4 g) by mouth every hour as needed for low blood sugar       insulin glargine 100 UNIT/ML injection    LANTUS    15 mL    Inject 30 Units Subcutaneous every morning       insulin pen needle 31G X 6 MM     120 each    Use as directed       levothyroxine 200 MCG tablet    SYNTHROID/LEVOTHROID    90 tablet    Take 1 tablet (200 mcg) by mouth See Admin Instructions New daily increase. Recheck labs in 8 weeks.       losartan 100 MG tablet    COZAAR    90 tablet    Take 1 tablet (100 mg) by mouth  daily       metoprolol 25 MG 24 hr tablet    TOPROL-XL    90 tablet    Take 1 tablet (25 mg) by mouth daily       nitroglycerin 0.4 MG sublingual tablet    NITROSTAT    25 tablet    Place 1 tablet (0.4 mg) under the tongue every 5 minutes as needed for chest pain       nystatin 754099 UNIT/GM Powd    MYCOSTATIN    30 g    Apply 1 g topically 3 times daily as needed       oxyCODONE 10 MG IR tablet    ROXICODONE    90 tablet    Take 1 tablet (10 mg) by mouth every 8 hours as needed       polyethylene glycol powder    MIRALAX    510 g    Take 17 g by mouth daily       SM ALCOHOL PREP 70 % Pads          vitamin D 2000 UNITS tablet     60 tablet    Take 2,000 Units by mouth daily

## 2017-02-17 NOTE — PROGRESS NOTES
Kamaljit Ramos, arranged transportation thru [facility] for the below appt:  Appt Date: 2/20 at 10:30am  Clinic Name:  [facility], [address], [place]  Transportation Provider: [employer]   time:  9:30-10am    Appt Date: 2/23 at 1pm - arrival time 12:45am  Clinic Name:  [facility], [address] [facility], [place]  Transportation Provider: [employer]   time:  11:45-12;15pm    Appt Date: 3/10 at 9am  Clinic Name:  [facility], [address], [place]  Transportation Provider: [employer]   time:  8-8:30am    Notified member of  time.  Shakila Fountain  Case Management Specialist  [facility]  [phone]

## 2017-02-20 ENCOUNTER — OFFICE VISIT (OUTPATIENT)
Dept: FAMILY MEDICINE | Facility: CLINIC | Age: 72
End: 2017-02-20
Payer: MEDICARE

## 2017-02-20 VITALS — DIASTOLIC BLOOD PRESSURE: 64 MMHG | SYSTOLIC BLOOD PRESSURE: 108 MMHG

## 2017-02-20 DIAGNOSIS — I10 HTN (HYPERTENSION): Primary | ICD-10-CM

## 2017-02-20 PROCEDURE — 99207 ZZC NO CHARGE NURSE ONLY: CPT

## 2017-02-20 NOTE — PROGRESS NOTES
Kim Anne is a 71 year old female who comes in today for a Blood Pressure check because of medication change.    *Document pulse and BP  *Use new set of vitals button for multiple readings.  *Use extended vitals for orthostatic    Vitals as recorded, a regular cuff was used.    Patient is taking medication as prescribed  Patient is tolerating medications well.  Patient is not monitoring Blood Pressure at home.      Current complaints: none    /64 today, patient has no complaints.     Disposition: results routed to MD/AP  Thank you  Helga Marquis RN

## 2017-02-20 NOTE — MR AVS SNAPSHOT
After Visit Summary   2/20/2017    Kim Anne    MRN: [id number]           Patient Information     Date Of Birth          1945        Visit Information        Provider Department      2/20/2017 10:30 AM [facility] FLOAT NURSE [facility]        Today's Diagnoses     HTN (hypertension)    -  1       Follow-ups after your visit        Your next 10 appointments already scheduled     Feb 23, 2017  1:00 PM CST   (Arrive by 12:45 PM)   Return Visit with Emily Cordova MD   [facility] ([facility])    [address] [place] [address]  3rd [facility] [phone]            Mar 10, 2017  9:00 AM CST   Return Visit with Mai Babcock MD   [facility] ([facility])    [address] [place] [address] [address] [place] [phone]            Apr 14, 2017 10:30 AM CDT   [facility] ([facility])    [address] [place] [address] [address] [place] [phone]            Apr 21, 2017  8:45 AM CDT   Lab with  LAB   [facility] ([facility])    [address] [place] [address]  1st [facility] [phone]            Apr 21, 2017  9:40 AM CDT   (Arrive by 9:10 AM)   Return Visit with Bhargav Lopes MD   [facility] Nephrology ([facility])    [address] [place] [address]  3rd [facility] [phone]              Who to contact     If you have questions or need follow up information about today's clinic visit or your schedule please contact [facility] directly at [phone].  Normal or non-critical lab and imaging results will be communicated to you by MyChart, letter or phone within 4 business days after the clinic has  "received the results. If you do not hear from us within 7 days, please contact the clinic through [employer] or phone. If you have a critical or abnormal lab result, we will notify you by phone as soon as possible.  Submit refill requests through [employer] or call your pharmacy and they will forward the refill request to us. Please allow 3 business days for your refill to be completed.          Additional Information About Your Visit        [employer] Information     [employer] lets you send messages to your doctor, view your test results, renew your prescriptions, schedule appointments and more. To sign up, go to [email or web address] . Click on \"Log in\" on the left side of the screen, which will take you to the Welcome page. Then click on \"Sign up Now\" on the right side of the page.     You will be asked to enter the access code listed below, as well as some personal information. Please follow the directions to create your username and password.     Your access code is: [email or web address]  Expires: 2017 10:47 AM     Your access code will  in 90 days. If you need help or a new code, please call your [place] clinic or [phone].        Care EveryWhere ID     This is your Care EveryWhere ID. This could be used by other organizations to access your [place] medical records  [id number]         Blood Pressure from Last 3 Encounters:   17 108/64   17 103/66   17 158/81    Weight from Last 3 Encounters:   17 196 lb (88.9 kg)   17 193 lb 4.8 oz (87.7 kg)   17 199 lb 8 oz (90.5 kg)              Today, you had the following     No orders found for display       Primary Care Provider Office Phone # Fax #    Mai Babcock [phone]       [facility]ARE [facility] [address] [facility] [address]  [facility] [address]        Thank you!     Thank you for choosing [facility] PRIMARY CARE  for your care. Our goal is always to provide you with excellent care. " Hearing back from our patients is one way we can continue to improve our services. Please take a few minutes to complete the written survey that you may receive in the mail after your visit with us. Thank you!             Your Updated Medication List - Protect others around you: Learn how to safely use, store and throw away your medicines at www.disposemymeds.org.          This list is accurate as of: 2/20/17 10:43 AM.  Always use your most recent med list.                   Brand Name Dispense Instructions for use    albuterol 108 (90 BASE) MCG/ACT Inhaler    PROAIR HFA/PROVENTIL HFA/VENTOLIN HFA    18 g    Inhale 2 puffs into the lungs every 6 hours as needed for shortness of breath / dyspnea or wheezing       ASPIRIN LOW DOSE 81 MG EC tablet   Generic drug:  aspirin     30 tablet    Take 1 tablet (81 mg) by mouth daily       atorvastatin 40 MG tablet    LIPITOR    90 tablet    Take 1 tablet (40 mg) by mouth daily       blood glucose monitoring lancets     1 Box    Test BS four times daily as directed       blood glucose monitoring test strip    no brand specified    2 Box    1 strip by In Vitro route 2 times daily Freestyle Lite       budesonide-formoterol 80-4.5 MCG/ACT Inhaler    SYMBICORT    1 Inhaler    Inhale 2 puffs into the lungs 2 times daily       docusate sodium 100 MG capsule    COLACE    60 capsule    Take 1 capsule (100 mg) by mouth 2 times daily       EUCERIN CALMING DAILY MOIST Crea     1 Tube    Externally apply 1 dose * topically daily       ferrous sulfate 325 (65 FE) MG tablet    IRON    100 tablet    Take 1 tablet (325 mg) by mouth daily (with breakfast)       furosemide 20 MG tablet    LASIX    60 tablet    Take 1 tablet (20 mg) by mouth daily       glucose-vitamin C 4-0.006 G Chew    DEX4 GLUCOSE    50 tablet    Take 1 tablet (4 g) by mouth every hour as needed for low blood sugar       insulin glargine 100 UNIT/ML injection    LANTUS    15 mL    Inject 30 Units Subcutaneous every morning        insulin pen needle 31G X 6 MM     120 each    Use as directed       levothyroxine 200 MCG tablet    SYNTHROID/LEVOTHROID    90 tablet    Take 1 tablet (200 mcg) by mouth See Admin Instructions New daily increase. Recheck labs in 8 weeks.       losartan 100 MG tablet    COZAAR    90 tablet    Take 1 tablet (100 mg) by mouth daily       metoprolol 25 MG 24 hr tablet    TOPROL-XL    90 tablet    Take 1 tablet (25 mg) by mouth daily       nitroglycerin 0.4 MG sublingual tablet    NITROSTAT    25 tablet    Place 1 tablet (0.4 mg) under the tongue every 5 minutes as needed for chest pain       nystatin [id number] UNIT/GM Powd    MYCOSTATIN    30 g    Apply 1 g topically 3 times daily as needed       oxyCODONE 10 MG IR tablet    ROXICODONE    90 tablet    Take 1 tablet (10 mg) by mouth every 8 hours as needed       polyethylene glycol powder    MIRALAX    510 g    Take 17 g by mouth daily       SM ALCOHOL PREP 70 % Pads          vitamin D 2000 UNITS tablet     60 tablet    Take 2,000 Units by mouth daily

## 2017-02-23 ENCOUNTER — OFFICE VISIT (OUTPATIENT)
Dept: PULMONOLOGY | Facility: CLINIC | Age: 72
End: 2017-02-23
Payer: MEDICARE

## 2017-02-23 VITALS
RESPIRATION RATE: 16 BRPM | BODY MASS INDEX: 32.62 KG/M2 | DIASTOLIC BLOOD PRESSURE: 72 MMHG | OXYGEN SATURATION: 97 % | WEIGHT: 196 LBS | HEART RATE: 63 BPM | SYSTOLIC BLOOD PRESSURE: 111 MMHG

## 2017-02-23 DIAGNOSIS — J44.9 CHRONIC OBSTRUCTIVE PULMONARY DISEASE, UNSPECIFIED COPD TYPE (H): Primary | ICD-10-CM

## 2017-02-23 PROCEDURE — 99212 OFFICE O/P EST SF 10 MIN: CPT | Mod: ZF

## 2017-02-23 ASSESSMENT — PAIN SCALES - GENERAL: PAINLEVEL: NO PAIN (0)

## 2017-02-23 NOTE — LETTER
"2/23/2017       RE: Kim Anne  [address] [place] [facility] [address]     Dear Colleague,    Thank you for referring your patient, Kim Anne, to the [facility] at [facility]. Please see a copy of my visit note below.    HISTORY OF PRESENT ILLNESS:  The patient is a 71-year-old female with COPD and asthma.  She comes in today for followup.  I had last seen her in August and at that time we planned for 6-month followup.  She was hospitalized with pneumonia in November.  She feels that she has recovered completely from this pneumonia, although she does report that her breathing overall is gradually declining.  She has a chronic cough productive of small amounts of green phlegm without hemoptysis.  She has the persistent sensation that the phlegm is coming from the left side of her chest -- this has not changed.  The area is occasionally painful.  She does not have any fevers or chills.  Her weight is stable.  She has diffuse joint pain, but this is also unchanged.  Reflux symptoms are under good control.  A complete review of systems is negative.      She has Symbicort and uses this twice daily \"most of the time.\"  She also has an albuterol inhaler that she uses p.r.n.  She uses this most days, sometimes more than once each day.  Her son is currently hospitalized at [facility] after a significant illness, but he is getting better.      SOCIAL HISTORY:  She continues to smoke cigarettes.      PHYSICAL EXAMINATION:   GENERAL:  She is alert, breathing without the use of accessory respiratory muscles.   VITAL SIGNS:  Blood pressure 111/72, heart rate 63, respirations 16, oxygen saturation 97% on room air.   HEENT:  Normocephalic, atraumatic.  Sclerae are anicteric and conjunctivae are without injection.  Oropharynx is without lesion and mucous membranes are moist.   NECK:  No adenopathy or thyromegaly.   LUNGS:  Scattered expiratory " wheezes, no crackles or areas of focal consolidation.   HEART:  Regular rate and rhythm, S1, S2, without murmurs.   EXTREMITIES:  No clubbing, cyanosis or edema.   SKIN:  Warm and dry without rashes, erythema or ecchymoses.   NEUROLOGIC:  She is alert and appropriate.  Speech is fluent and there is no facial droop.  She is ambulatory.      IMAGING:  Chest x-ray from November is personally reviewed.  She has right upper lobe and left lower lobe infiltrates.      ASSESSMENT AND PLAN:  Chronic obstructive pulmonary disease with recurrent exacerbation:  She was most recently in the hospital in 2016.  She had pneumonia at that time.  I thought about getting a repeat chest x-ray to document clearing of infiltrates; however, she had a chest CT in 2016 that showed only a 4 mm (stable over 2 years) pulmonary nodule.  Therefore, I think the likelihood of underlying malignancy is very low.      Compliance has always been an issue for her and I encouraged her to quit smoking and to use her Symbicort on a regular basis.  I did not make any other changes to her medications at this time.  I encouraged her to remain active and she will return to clinic in 6 months, sooner p.r.n.         FLAKITO CORDOVA MD             D: 2017 13:10   T: 2017 17:10   MT: TT      Name:     JONEL CHAVEZ   MRN:      [id number]        Account:      [id number]   :      1945           Service Date: 2017      Document: [id number]       Again, thank you for allowing me to participate in the care of your patient.      Sincerely,    Flakito Cordova MD

## 2017-02-23 NOTE — PROGRESS NOTES
SUBJECTIVE:                                                    Kim Anne is a 71 year old female who presents to clinic today for the following health issues:      Patient Description:  obese female with glasses        Diabetes Follow-up AT GOAL  A1C goal: <7    Lab Results   Component Value Date    A1C 6.3 12/13/2016    A1C 7.2 10/11/2016    A1C 9.1 08/29/2016    A1C 7.2 06/30/2016    A1C 6.8 12/09/2015     TSH   Date Value Ref Range Status   11/03/2016 1.28 0.40 - 4.00 mU/L Final         Patient is checking blood sugars: not at all    Diabetic concerns: None     Symptoms of hypoglycemia (low blood sugar): none     Paresthesias (numbness or burning in feet) or sores: No     Diabetic eye exam within the last year: Yes     Hyperlipidemia Follow-Up AT GOAL   LDL goal: <100    LDL Cholesterol Calculated   Date Value Ref Range Status   12/20/2013 109 0 - 129 mg/dL Final     Comment:     LDL Cholesterol is the primary guide to therapy: LDL-cholesterol goal in high   risk patients is <100 mg/dL and in very high risk patients is <70 mg/dL.     LDL Cholesterol Direct   Date Value Ref Range Status   08/29/2016 85 <100 mg/dL Final     Comment:     Desirable:       <100 mg/dl   ]      Rate your low fat/cholesterol diet?: good    Taking statin?  Yes, no muscle aches from statin    Other lipid medications/supplements?:  none     Hypertension Follow-up AT GOAL  BP goal: <140/90    BP Readings from Last 3 Encounters:   03/10/17 95/69   02/23/17 111/72   02/20/17 108/64     Last Basic Metabolic Panel:  Lab Results   Component Value Date     02/13/2017      Lab Results   Component Value Date    POTASSIUM 5.8 02/13/2017     Lab Results   Component Value Date    CHLORIDE 115 02/13/2017     Lab Results   Component Value Date    FRANCES 8.3 02/13/2017     Lab Results   Component Value Date    CO2 21 02/13/2017     Lab Results   Component Value Date    BUN 27 02/13/2017     Lab Results   Component Value Date     CR 2.62 02/13/2017     Lab Results   Component Value Date     02/13/2017         Outpatient blood pressures are not being checked.    Low Salt Diet: no added salt       CKD 4- f/u with renal  Creatinine   Date Value Ref Range Status   02/13/2017 2.62 (H) 0.52 - 1.04 mg/dL Final   ]  GFR Estimate   Date Value Ref Range Status   02/13/2017 18 (L) >60 mL/min/1.7m2 Final     Comment:     Non  GFR Calc   01/27/2017 19 (L) >60 mL/min/1.7m2 Final     Comment:     Non  GFR Calc   01/11/2017 22 (L) >60 mL/min/1.7m2 Final     Comment:     Non  GFR Calc       Depression Followup    Status since last visit: Stable     See PHQ-9 for current symptoms.  Other associated symptoms: None    Complicating factors:   Significant life event:  No   Current substance abuse:  None  Anxiety or Panic symptoms:  No  MENTAL STATUS EXAM  Appearance: Appears stated age, dressed and groomed appropriately for the visit today.  Attitude: cooperative  Behavior: normal  Eye Contact: normal  Speech: normal  Orientation: oriented to person, place, time and situation  Mood:  admits sadness and anxiety most of time  Affect: Mood Congruent  Thought Process: clear  Suicidal Ideation: reports no thoughts, no intention  Hallucination: no  Paranoid-no  Manic-no  Panic-no  Self harm-no  OCD-no  Gambling-no  Excessive shopping no  Legal no  Self Harm no    Sleep - ok nocturia x3  Appetite - fasting, otherwise ok.   Exercise - walking daily-goes to hospital in [place] to see her son.   He has been slowly improving, planning rehab at [facility].    Recent falls - no  Recent blackouts - no  Seizures - no    Smoking - yes 10 cig/day. Quit in the past cold turkey.   Alcohol - no  Street drugs - no  Marijuana - no  Caffeine - coffee 1 cup per day    Any ER/UC or hospital visits in the last 2 weeks? - no  Eye exam up to date? - UTD  Dental visit up to date? - Dentures      PHQ-9  English PHQ-9   Any Language              Amount of exercise or physical activity: daily walking    Problems taking medications regularly: No    Medication side effects: none    Diet: low salt, low fat/cholesterol and diabetic    Social History   Substance Use Topics     Smoking status: Current Every Day Smoker     Packs/day: 0.10     Years: 40.00     Types: Cigarettes     Last attempt to quit: 10/31/2016     Smokeless tobacco: Never Used     Alcohol use No      Problem list and histories reviewed & adjusted, as indicated.  Additional history: as documented    Patient Active Problem List   Diagnosis     Hyperlipidemia LDL goal <100     ARAUZ (dyspnea on exertion)     COPD (chronic obstructive pulmonary disease) (H)     Edema     Fatigue     History of MI (myocardial infarction)     Snores     Knee pain, left     Bunion of great toe of left foot     Dystrophic nail     Arthritis     Peripheral vascular disease (H)     Chronic low back pain     Health Care Home     CKD (chronic kidney disease) stage 4, GFR 15-29 ml/min (H)     Abnormal gait     Advance Care Planning     Vitamin D deficiency     Constipation     Tobacco use disorder     Hypertension goal BP (blood pressure) < 130/80     Bradycardia     Callus of foot     Other chronic pain     Cataracts, bilateral     Hyperopic astigmatism of both eyes     Presbyopia     Asymptomatic bunion, right     Acquired hypothyroidism     Type 2 diabetes mellitus with diabetic chronic kidney disease (H)     Hypertension associated with diabetes (H)     Hyperlipidemia associated with type 2 diabetes mellitus (H)     Obesity (BMI 30-39.9)     History of sick sinus syndrome     Nephrotic syndrome     Pneumonia     Grief     Past Surgical History   Procedure Laterality Date     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     Appendectomy       Cholecystectomy       Hysteroscopic placement contraceptive device       Elected term pregnancy       Subclavian stent  august 2010     [facility]     Ovary surgery        left for cyst benign     Colonoscopy  7/15/2011     polyps repeat in 5 years     Blepharoplasty bilateral  2013     Procedure: BLEPHAROPLASTY BILATERAL;  BILATERAL UPPER EYELID BLEPHAROPLASTY ;  Surgeon: Olayinka Lyon MD;  Location: [facility]       Social History   Substance Use Topics     Smoking status: Current Every Day Smoker     Packs/day: 0.10     Years: 40.00     Types: Cigarettes     Last attempt to quit: 10/31/2016     Smokeless tobacco: Never Used     Alcohol use No     Family History   Problem Relation Age of Onset     DIABETES Mother      brother, MGM, sister     KIDNEY DISEASE Brother      X2 DM two      Alcohol/Drug Child      daughter     Asthma No family hx of      C.A.D. No family hx of      Hypertension No family hx of      CEREBROVASCULAR DISEASE No family hx of      Breast Cancer No family hx of      Cancer - colorectal No family hx of      Prostate Cancer No family hx of      Allergies No family hx of      Alzheimer Disease No family hx of      Anesthesia Reaction No family hx of      Arthritis No family hx of      Blood Disease No family hx of      CANCER No family hx of      Cardiovascular No family hx of      Circulatory No family hx of      Congenital Anomalies No family hx of      Connective Tissue Disorder No family hx of      Depression No family hx of      Eye Disorder No family hx of      Genetic Disorder No family hx of      GASTROINTESTINAL DISEASE No family hx of      Genitourinary Problems No family hx of      Gynecology No family hx of      HEART DISEASE No family hx of      Lipids No family hx of      Musculoskeletal Disorder No family hx of      Neurologic Disorder No family hx of      Obesity No family hx of      OSTEOPOROSIS No family hx of      Psychotic Disorder No family hx of      Respiratory No family hx of      Thyroid Disease No family hx of      Glaucoma No family hx of      Macular Degeneration No family hx of          Current Outpatient Prescriptions    Medication Sig Dispense Refill     oxyCODONE (ROXICODONE) 10 MG IR tablet Take 1 tablet (10 mg) by mouth every 8 hours as needed 90 tablet 0     insulin glargine (LANTUS) 100 UNIT/ML injection Inject 30 Units Subcutaneous every morning 15 mL 3     budesonide-formoterol (SYMBICORT) 80-4.5 MCG/ACT Inhaler Inhale 2 puffs into the lungs 2 times daily 1 Inhaler 1     atorvastatin (LIPITOR) 40 MG tablet Take 1 tablet (40 mg) by mouth daily 90 tablet 1     docusate sodium (COLACE) 100 MG capsule Take 1 capsule (100 mg) by mouth 2 times daily 60 capsule 4     glucose-vitamin C (DEX4 GLUCOSE) 4-0.006 G CHEW Take 1 tablet (4 g) by mouth every hour as needed for low blood sugar 50 tablet 3     losartan (COZAAR) 100 MG tablet Take 1 tablet (100 mg) by mouth daily 90 tablet 0     metoprolol (TOPROL-XL) 25 MG 24 hr tablet Take 1 tablet (25 mg) by mouth daily 90 tablet 1     albuterol (PROAIR HFA/PROVENTIL HFA/VENTOLIN HFA) 108 (90 BASE) MCG/ACT Inhaler Inhale 2 puffs into the lungs every 6 hours as needed for shortness of breath / dyspnea or wheezing 18 g 3     nystatin (MYCOSTATIN) 043595 UNIT/GM POWD Apply 1 g topically 3 times daily as needed 30 g 1     furosemide (LASIX) 20 MG tablet Take 1 tablet (20 mg) by mouth daily 60 tablet 11     Cholecalciferol (VITAMIN D) 2000 UNITS tablet Take 2,000 Units by mouth daily 60 tablet 2     Alcohol Swabs (SM ALCOHOL PREP) 70 % PADS        levothyroxine (SYNTHROID, LEVOTHROID) 200 MCG tablet Take 1 tablet (200 mcg) by mouth See Admin Instructions New daily increase. Recheck labs in 8 weeks. 90 tablet 1     insulin pen needle 31G X 6 MM Use as directed 120 each 3     ASPIRIN LOW DOSE 81 MG EC tablet Take 1 tablet (81 mg) by mouth daily 30 tablet 11     ferrous sulfate (IRON) 325 (65 FE) MG tablet Take 1 tablet (325 mg) by mouth daily (with breakfast) 100 tablet 1     blood glucose monitoring (FREESTYLE) lancets Test BS four times daily as directed 1 Box 12     blood glucose monitoring  (NO BRAND SPECIFIED) test strip 1 strip by In Vitro route 2 times daily Freestyle Lite 2 Box 12     polyethylene glycol (MIRALAX) powder Take 17 g by mouth daily 510 g 2     nitroglycerin (NITROSTAT) 0.4 MG SL tablet Place 1 tablet (0.4 mg) under the tongue every 5 minutes as needed for chest pain 25 tablet 0     Emollient (EUCERIN CALMING DAILY MOIST) CREA Externally apply 1 dose * topically daily 1 Tube 12     Allergies   Allergen Reactions     Contrast Dye Hives and Itching     Clonidine      She had as IP and thinks it made her itchy     Diatrizoate Other (See Comments)     Diltiazem      Severe bradycardia     Hydralazine      Drumright tab patient thought made her itchy so stopped     Iodine-131      Recent Labs   Lab Test  02/13/17   1102  01/27/17   0951   12/13/16   1036   11/03/16   0545   11/02/16   0622  11/01/16   2335  10/11/16   0842  08/29/16   1652   12/09/15   0946   03/04/15   0944   12/20/13   1022   10/19/11   1111  08/19/11   0945   A1C   --    --    --   6.3*   --    --    --    --    --   7.2*  9.1*   < >  6.8*   < >  6.9*   < >  6.7*   < >   --   6.9*   LDL   --    --    --    --    --    --    --    --    --    --   85   --   72   --   99   < >  109   < >  85  109   HDL   --    --    --    --    --    --    --    --    --    --    --    --    --    --    --    --   43*   --   49*  49*   TRIG   --    --    --    --    --    --    --    --    --    --    --    --    --    --    --    --   331*   --   175*  254*   ALT   --    --    --    --    --    --    --   21  27   --   23   --   27   < >   --    < >   --    < >  14   --    CR  2.62*  2.47*   < >   --    < >  2.39*   < >  2.64*  2.53*  2.24*  2.17*   < >  1.75*   < >  1.59*   < >   --    < >   --   1.20*   GFRESTIMATED  18*  19*   < >   --    < >  20*   < >  18*  19*  22*  22*   < >  29*   < >  32*   < >   --    < >   --   45*   GFRESTBLACK  22*  23*   < >   --    < >  24*   < >  22*  23*  26*  27*   < >  35*   < >  39*   < >   --    < >   --  "  55*   POTASSIUM  5.8*  5.4*   < >   --    < >  4.5   < >  5.6*  5.3  5.5*  5.2   < >  4.9   < >  5.1   < >   --    < >   --   4.2   TSH   --    --    --    --    --   1.28   --    --    --   17.25*  13.11*   --   1.24   < >   --    < >   --    < >  6.82*  36.00*    < > = values in this interval not displayed.      BP Readings from Last 3 Encounters:   03/10/17 95/69   02/23/17 111/72   02/20/17 108/64    Wt Readings from Last 3 Encounters:   03/10/17 196 lb (88.9 kg)   02/23/17 196 lb (88.9 kg)   02/13/17 196 lb (88.9 kg)            Labs reviewed in EPIC  Problem list, Medication list, Allergies, and Medical/Social/Surgical histories reviewed in [facility] and updated as appropriate.    ROS: 10 point ROS neg other than the symptoms noted above in the HPI.      OBJECTIVE:                                                    BP 95/69  Pulse 70  Temp 98.4  F (36.9  C) (Oral)  Resp 20  Ht 5' 5\" (1.651 m)  Wt 196 lb (88.9 kg)  SpO2 97%  BMI 32.62 kg/m2  There is no height or weight on file to calculate BMI.  GENERAL:   female, healthy, alert and no distress  EYES: Eyes grossly normal to inspection, extraocular movements - intact, and PERRL  HENT: ear canals- normal; TMs- normal; Nose- normal; Mouth- no ulcers, no lesions  NECK: no tenderness, no adenopathy, no asymmetry, no masses, no stiffness; thyroid- normal to palpation  RESP: lungs clear to auscultation - no rales, no rhonchi, no wheezes  CV: regular rates and rhythm, normal S1 S2, no S3 or S4 and no murmur, no click or rub -no edema no casimrio/cords  ABDOMEN: soft, no tenderness, no  hepatosplenomegaly, no masses, normal bowel sounds  MS: extremities- no gross deformities noted, no edema  SKIN: no suspicious lesions, no rashes  NEURO: strength and tone- normal, sensory exam- grossly normal, mentation- intact, speech- normal, reflexes- symmetric  Non focal no aphasia. No facial asymmetry. Finger to nose, rapid alteration, finger thumb opposition, heel knee " shin are normal.  Diabetic foot exam: normal DP and PT pulses, no trophic changes or ulcerative lesions and normal sensory exam left bunion  BACK: no CVA tenderness, no paralumbar tenderness  PSYCH: Alert and oriented times 3; speech- coherent , normal rate and volume; able to articulate logical thoughts, able to abstract reason, no tangential thoughts, no hallucinations or delusions, affect- normal    LYMPHATICS: ant. cervical- normal, post. cervical- normal, axillary- normal, supraclavicular- normal, inguinal- normal    Pain rating on Oxycodone 4/10 today  TSH   Date Value Ref Range Status   11/03/2016 1.28 0.40 - 4.00 mU/L Final   ]     Used Nitro 4 days ago. Was walking then mild left sided chest pain. No nausea/no radiation. Took one and the pain was gone. Declined EKG today, no active CP today.  ASSESSMENT/PLAN:                                                      ASSESSMENT / PLAN:  (R60.9) Edema, unspecified type  (primary encounter diagnosis)  Comment: none today, but gets slight swelling over the day per her report.  Advised to watch sodium intake, elevate.  Plan: order for DME            (M54.5,  G89.29) Chronic low back pain without sciatica, unspecified back pain laterality  Comment: Activity as tolerated  Plan: oxyCODONE (ROXICODONE) 10 MG IR tablet,             (J44.9) Chronic obstructive pulmonary disease, unspecified COPD type (H)  Comment:   Plan: budesonide-formoterol (SYMBICORT) 80-4.5         MCG/ACT Inhaler            (E11.22,  N18.4,  Z79.4) Controlled type 2 diabetes mellitus with stage 4 chronic kidney disease, with long-term current use of insulin (H)  Comment:   Plan: glucose-vitamin C (DEX4 GLUCOSE) 4-0.006 G CHEW            (E11.59,  I10) Hypertension associated with diabetes (H)  Comment:   Plan: losartan (COZAAR) 100 MG tablet            (E03.8) Other specified hypothyroidism  Comment:   Plan: levothyroxine (SYNTHROID/LEVOTHROID) 200 MCG         tablet            (E55.9) Vitamin D  deficiency disease  Comment:   Plan: Cholecalciferol (VITAMIN D) 2000 UNITS tablet                Patient Instructions:  Refills done today    Labs next visit with Dr GRIGGS in 2 months    2 months oxycodone scripts given    DME for support hose    Report if more episodes of increasing chest pain. Go to ER if not helped by nitro.        Mai Babcock MD  [facility]  30 min spent in direct face to face time with this pt discussing chronic pain, DM/BP/Lipid and other issues above and others described above, greater than 50% in counseling and coordination of care.

## 2017-02-23 NOTE — NURSING NOTE
Chief Complaint   Patient presents with     Breathing Problem     Patient is being seen for follow up of breathing issues      Pamela Turk CMA at 12:52 PM on 2/23/2017

## 2017-02-23 NOTE — PATIENT INSTRUCTIONS
Keep using the same inhalers; try to keep as active as you can.     We'll see you again in 6 months, and we'll check your breathing numbers at that time.    Work on quitting smoking!

## 2017-02-23 NOTE — MR AVS SNAPSHOT
After Visit Summary   2/23/2017    Kim Anne    MRN: [id number]           Patient Information     Date Of Birth          1945        Visit Information        Provider Department      2/23/2017 1:00 PM Emliy Cordova MD [facility]        Today's Diagnoses     Chronic obstructive pulmonary disease, unspecified COPD type (H)    -  1      Care Instructions    Keep using the same inhalers; try to keep as active as you can.     We'll see you again in 6 months, and we'll check your breathing numbers at that time.    Work on quitting smoking!        Follow-ups after your visit        Follow-up notes from your care team     Return in about 6 months (around 8/23/2017).      Your next 10 appointments already scheduled     Mar 08, 2017 12:30 PM CST   RETURN GENERAL with Cyn Gabriel MD   Eye Clinic ([facility])    [facility]  [address] [facility] [place]  [address] [address]   [phone]            Mar 10, 2017  9:00 AM CST   Return Visit with Mai Babcock MD   [facility] ([facility])    [address] [place] [address] [address] [place] [id number] [phone]            Apr 14, 2017 10:30 AM CDT   SHORT with Pratibha Barth   [facility] MT ([facility])    [address] [place] [address] [address] [place] [address]   [phone]            Apr 21, 2017  8:45 AM CDT   Lab with  LAB   [facility] ([facility])    [address] [place] [address]  1st [facility] [id number] [phone]            Apr 21, 2017  9:40 AM CDT   (Arrive by 9:10 AM)   Return Visit with Bhargav Lopes MD   [facility] Nephrology ([facility])    [address] [place] [address]  3rd [facility] [id number] [phone]              Who to contact     If you have questions or  "need follow up information about today's clinic visit or your schedule please contact [facility] directly at [phone].  Normal or non-critical lab and imaging results will be communicated to you by NeXeptionhart, letter or phone within 4 business days after the clinic has received the results. If you do not hear from us within 7 days, please contact the clinic through NeXeptionhart or phone. If you have a critical or abnormal lab result, we will notify you by phone as soon as possible.  Submit refill requests through [employer] or call your pharmacy and they will forward the refill request to us. Please allow 3 business days for your refill to be completed.          Additional Information About Your Visit        NeXeptionharPublicStuff Information     [employer] lets you send messages to your doctor, view your test results, renew your prescriptions, schedule appointments and more. To sign up, go to [email or web address] . Click on \"Log in\" on the left side of the screen, which will take you to the Welcome page. Then click on \"Sign up Now\" on the right side of the page.     You will be asked to enter the access code listed below, as well as some personal information. Please follow the directions to create your username and password.     Your access code is: [email or web address]  Expires: 2017 10:47 AM     Your access code will  in 90 days. If you need help or a new code, please call your [facility] clinic or [phone].        Care EveryWhere ID     This is your Care EveryWhere ID. This could be used by other organizations to access your [facility] medical records  [id number]        Your Vitals Were     Pulse Respirations Pulse Oximetry BMI (Body Mass Index)          63 16 97% 32.62 kg/m2         Blood Pressure from Last 3 Encounters:   17 111/72   17 108/64   17 103/66    Weight from Last 3 Encounters:   17 88.9 kg (196 lb)   17 88.9 kg (196 lb)   17 87.7 kg (193 lb 4.8 " oz)              Today, you had the following     No orders found for display       Primary Care Provider Office Phone # Fax #    Mai Babcock [phone]       [facility] [address] [facility] [address]  [facility] [address]        Thank you!     Thank you for choosing [facility]  for your care. Our goal is always to provide you with excellent care. Hearing back from our patients is one way we can continue to improve our services. Please take a few minutes to complete the written survey that you may receive in the mail after your visit with us. Thank you!             Your Updated Medication List - Protect others around you: Learn how to safely use, store and throw away your medicines at www.disposemymeds.org.          This list is accurate as of: 2/23/17 11:59 PM.  Always use your most recent med list.                   Brand Name Dispense Instructions for use    albuterol 108 (90 BASE) MCG/ACT Inhaler    PROAIR HFA/PROVENTIL HFA/VENTOLIN HFA    18 g    Inhale 2 puffs into the lungs every 6 hours as needed for shortness of breath / dyspnea or wheezing       ASPIRIN LOW DOSE 81 MG EC tablet   Generic drug:  aspirin     30 tablet    Take 1 tablet (81 mg) by mouth daily       atorvastatin 40 MG tablet    LIPITOR    90 tablet    Take 1 tablet (40 mg) by mouth daily       blood glucose monitoring lancets     1 Box    Test BS four times daily as directed       blood glucose monitoring test strip    no brand specified    2 Box    1 strip by In Vitro route 2 times daily Freestyle Lite       budesonide-formoterol 80-4.5 MCG/ACT Inhaler    SYMBICORT    1 Inhaler    Inhale 2 puffs into the lungs 2 times daily       docusate sodium 100 MG capsule    COLACE    60 capsule    Take 1 capsule (100 mg) by mouth 2 times daily       EUCERIN CALMING DAILY MOIST Crea     1 Tube    Externally apply 1 dose * topically daily       ferrous sulfate 325 (65 FE) MG tablet    IRON     100 tablet    Take 1 tablet (325 mg) by mouth daily (with breakfast)       furosemide 20 MG tablet    LASIX    60 tablet    Take 1 tablet (20 mg) by mouth daily       glucose-vitamin C 4-0.006 G Chew    DEX4 GLUCOSE    50 tablet    Take 1 tablet (4 g) by mouth every hour as needed for low blood sugar       insulin glargine 100 UNIT/ML injection    LANTUS    15 mL    Inject 30 Units Subcutaneous every morning       insulin pen needle 31G X 6 MM     120 each    Use as directed       levothyroxine 200 MCG tablet    SYNTHROID/LEVOTHROID    90 tablet    Take 1 tablet (200 mcg) by mouth See Admin Instructions New daily increase. Recheck labs in 8 weeks.       losartan 100 MG tablet    COZAAR    90 tablet    Take 1 tablet (100 mg) by mouth daily       metoprolol 25 MG 24 hr tablet    TOPROL-XL    90 tablet    Take 1 tablet (25 mg) by mouth daily       nitroglycerin 0.4 MG sublingual tablet    NITROSTAT    25 tablet    Place 1 tablet (0.4 mg) under the tongue every 5 minutes as needed for chest pain       nystatin 989759 UNIT/GM Powd    MYCOSTATIN    30 g    Apply 1 g topically 3 times daily as needed       oxyCODONE 10 MG IR tablet    ROXICODONE    90 tablet    Take 1 tablet (10 mg) by mouth every 8 hours as needed       polyethylene glycol powder    MIRALAX    510 g    Take 17 g by mouth daily       SM ALCOHOL PREP 70 % Pads          vitamin D 2000 UNITS tablet     60 tablet    Take 2,000 Units by mouth daily

## 2017-02-24 NOTE — PROGRESS NOTES
"HISTORY OF PRESENT ILLNESS:  The patient is a 71-year-old female with COPD and asthma.  She comes in today for followup.  I had last seen her in August and at that time we planned for 6-month followup.  She was hospitalized with pneumonia in November.  She feels that she has recovered completely from this pneumonia, although she does report that her breathing overall is gradually declining.  She has a chronic cough productive of small amounts of green phlegm without hemoptysis.  She has the persistent sensation that the phlegm is coming from the left side of her chest -- this has not changed.  The area is occasionally painful.  She does not have any fevers or chills.  Her weight is stable.  She has diffuse joint pain, but this is also unchanged.  Reflux symptoms are under good control.  A complete review of systems is negative.      She has Symbicort and uses this twice daily \"most of the time.\"  She also has an albuterol inhaler that she uses p.r.n.  She uses this most days, sometimes more than once each day.  Her son is currently hospitalized at [facility] after a significant illness, but he is getting better.      SOCIAL HISTORY:  She continues to smoke cigarettes.      PHYSICAL EXAMINATION:   GENERAL:  She is alert, breathing without the use of accessory respiratory muscles.   VITAL SIGNS:  Blood pressure 111/72, heart rate 63, respirations 16, oxygen saturation 97% on room air.   HEENT:  Normocephalic, atraumatic.  Sclerae are anicteric and conjunctivae are without injection.  Oropharynx is without lesion and mucous membranes are moist.   NECK:  No adenopathy or thyromegaly.   LUNGS:  Scattered expiratory wheezes, no crackles or areas of focal consolidation.   HEART:  Regular rate and rhythm, S1, S2, without murmurs.   EXTREMITIES:  No clubbing, cyanosis or edema.   SKIN:  Warm and dry without rashes, erythema or ecchymoses.   NEUROLOGIC:  She is alert and appropriate.  Speech is fluent and there is no facial " dyan.  She is ambulatory.      IMAGING:  Chest x-ray from November is personally reviewed.  She has right upper lobe and left lower lobe infiltrates.      ASSESSMENT AND PLAN:  Chronic obstructive pulmonary disease with recurrent exacerbation:  She was most recently in the hospital in 2016.  She had pneumonia at that time.  I thought about getting a repeat chest x-ray to document clearing of infiltrates; however, she had a chest CT in 2016 that showed only a 4 mm (stable over 2 years) pulmonary nodule.  Therefore, I think the likelihood of underlying malignancy is very low.      Compliance has always been an issue for her and I encouraged her to quit smoking and to use her Symbicort on a regular basis.  I did not make any other changes to her medications at this time.  I encouraged her to remain active and she will return to clinic in 6 months, sooner p.r.n.         FLAKITO TOPETE MD             D: 2017 13:10   T: 2017 17:10   MT: TT      Name:     JONEL CHAVEZ   MRN:      [id number]        Account:      [id number]   :      1945           Service Date: 2017      Document: [id number]

## 2017-03-10 ENCOUNTER — OFFICE VISIT (OUTPATIENT)
Dept: FAMILY MEDICINE | Facility: CLINIC | Age: 72
End: 2017-03-10
Payer: MEDICARE

## 2017-03-10 VITALS
TEMPERATURE: 98.4 F | OXYGEN SATURATION: 97 % | DIASTOLIC BLOOD PRESSURE: 69 MMHG | BODY MASS INDEX: 32.65 KG/M2 | WEIGHT: 196 LBS | HEART RATE: 70 BPM | SYSTOLIC BLOOD PRESSURE: 95 MMHG | HEIGHT: 65 IN | RESPIRATION RATE: 20 BRPM

## 2017-03-10 DIAGNOSIS — M54.50 CHRONIC LOW BACK PAIN WITHOUT SCIATICA, UNSPECIFIED BACK PAIN LATERALITY: ICD-10-CM

## 2017-03-10 DIAGNOSIS — E03.8 OTHER SPECIFIED HYPOTHYROIDISM: ICD-10-CM

## 2017-03-10 DIAGNOSIS — G89.29 CHRONIC LOW BACK PAIN WITHOUT SCIATICA, UNSPECIFIED BACK PAIN LATERALITY: ICD-10-CM

## 2017-03-10 DIAGNOSIS — E11.59 HYPERTENSION ASSOCIATED WITH DIABETES (H): ICD-10-CM

## 2017-03-10 DIAGNOSIS — Z79.4 CONTROLLED TYPE 2 DIABETES MELLITUS WITH STAGE 4 CHRONIC KIDNEY DISEASE, WITH LONG-TERM CURRENT USE OF INSULIN (H): ICD-10-CM

## 2017-03-10 DIAGNOSIS — J44.9 CHRONIC OBSTRUCTIVE PULMONARY DISEASE, UNSPECIFIED COPD TYPE (H): ICD-10-CM

## 2017-03-10 DIAGNOSIS — E55.9 VITAMIN D DEFICIENCY DISEASE: ICD-10-CM

## 2017-03-10 DIAGNOSIS — I15.2 HYPERTENSION ASSOCIATED WITH DIABETES (H): ICD-10-CM

## 2017-03-10 DIAGNOSIS — E11.22 CONTROLLED TYPE 2 DIABETES MELLITUS WITH STAGE 4 CHRONIC KIDNEY DISEASE, WITH LONG-TERM CURRENT USE OF INSULIN (H): ICD-10-CM

## 2017-03-10 DIAGNOSIS — N18.4 CONTROLLED TYPE 2 DIABETES MELLITUS WITH STAGE 4 CHRONIC KIDNEY DISEASE, WITH LONG-TERM CURRENT USE OF INSULIN (H): ICD-10-CM

## 2017-03-10 DIAGNOSIS — R60.9 EDEMA, UNSPECIFIED TYPE: Primary | ICD-10-CM

## 2017-03-10 PROCEDURE — 99214 OFFICE O/P EST MOD 30 MIN: CPT | Performed by: FAMILY MEDICINE

## 2017-03-10 RX ORDER — OXYCODONE HYDROCHLORIDE 10 MG/1
10 TABLET ORAL EVERY 8 HOURS PRN
Qty: 90 TABLET | Refills: 0 | Status: SHIPPED | OUTPATIENT
Start: 2017-03-13 | End: 2017-03-10

## 2017-03-10 RX ORDER — CHOLECALCIFEROL (VITAMIN D3) 50 MCG
2000 TABLET ORAL DAILY
Qty: 60 TABLET | Refills: 2 | Status: SHIPPED | OUTPATIENT
Start: 2017-03-10 | End: 2018-04-11

## 2017-03-10 RX ORDER — OXYCODONE HYDROCHLORIDE 10 MG/1
10 TABLET ORAL EVERY 8 HOURS PRN
Qty: 90 TABLET | Refills: 0 | Status: SHIPPED | OUTPATIENT
Start: 2017-04-12 | End: 2017-05-10

## 2017-03-10 RX ORDER — LOSARTAN POTASSIUM 100 MG/1
100 TABLET ORAL DAILY
Qty: 90 TABLET | Refills: 1 | Status: SHIPPED | OUTPATIENT
Start: 2017-03-10 | End: 2017-12-23 | Stop reason: SINTOL

## 2017-03-10 RX ORDER — BUDESONIDE AND FORMOTEROL FUMARATE DIHYDRATE 80; 4.5 UG/1; UG/1
2 AEROSOL RESPIRATORY (INHALATION) 2 TIMES DAILY
Qty: 1 INHALER | Refills: 1 | Status: SHIPPED | OUTPATIENT
Start: 2017-03-10 | End: 2017-05-10

## 2017-03-10 RX ORDER — LEVOTHYROXINE SODIUM 200 UG/1
200 TABLET ORAL SEE ADMIN INSTRUCTIONS
Qty: 90 TABLET | Refills: 1 | Status: SHIPPED | OUTPATIENT
Start: 2017-03-10 | End: 2017-05-10

## 2017-03-10 ASSESSMENT — PAIN SCALES - GENERAL: PAINLEVEL: MODERATE PAIN (4)

## 2017-03-10 NOTE — MR AVS SNAPSHOT
After Visit Summary   3/10/2017    Kim Anne    MRN: [id number]           Patient Information     Date Of Birth          1945        Visit Information        Provider Department      3/10/2017 9:00 AM Mai Babcock MD [facility]        Today's Diagnoses     Edema, unspecified type    -  1    Chronic low back pain without sciatica, unspecified back pain laterality        Chronic obstructive pulmonary disease, unspecified COPD type (H)        Controlled type 2 diabetes mellitus with stage 4 chronic kidney disease, with long-term current use of insulin (H)        Hypertension associated with diabetes (H)        Other specified hypothyroidism        Vitamin D deficiency disease          Care Instructions    Refills done today    Labs next visit with Dr GRIGGS in 2 months    2 months oxycodone scripts given    DME for support hose    Report if more episodes of increasing chest pain. Go to ER if not helped by nitro.        Follow-ups after your visit        Your next 10 appointments already scheduled     Apr 14, 2017 10:30 AM CDT   SHORT with Pratibha Barth   [facility] ([facility])    [address] [place] [address] [phone]            Apr 21, 2017  8:45 AM CDT   Lab with  LAB   [facility] ([facility])    [address] [place] [address]  1st [facility] [address] [phone]            Apr 21, 2017  9:40 AM CDT   (Arrive by 9:10 AM)   Return Visit with Bhargav Lopes MD   [facility] Nephrology ([facility])    [address] [place] [address]  3rd Floor  [facility] [address] [phone]              Who to contact     If you have questions or need follow up information about today's clinic visit or your schedule please contact [facility] directly at [phone].  Normal or  "non-critical lab and imaging results will be communicated to you by MyChart, letter or phone within 4 business days after the clinic has received the results. If you do not hear from us within 7 days, please contact the clinic through [employer] or phone. If you have a critical or abnormal lab result, we will notify you by phone as soon as possible.  Submit refill requests through enrich-in or call your pharmacy and they will forward the refill request to us. Please allow 3 business days for your refill to be completed.          Additional Information About Your Visit        enrich-in Information     enrich-in lets you send messages to your doctor, view your test results, renew your prescriptions, schedule appointments and more. To sign up, go to [email or web address] . Click on \"Log in\" on the left side of the screen, which will take you to the Welcome page. Then click on \"Sign up Now\" on the right side of the page.     You will be asked to enter the access code listed below, as well as some personal information. Please follow the directions to create your username and password.     Your access code is: [email or web address]  Expires: 2017 10:47 AM     Your access code will  in 90 days. If you need help or a new code, please call your [facility] clinic or [phone].        Care EveryWhere ID     This is your Care EveryWhere ID. This could be used by other organizations to access your [facility] medical records  [id number]        Your Vitals Were     Pulse Temperature Respirations Height Pulse Oximetry BMI (Body Mass Index)    70 98.4  F (36.9  C) (Oral) 20 5' 5\" (1.651 m) 97% 32.62 kg/m2       Blood Pressure from Last 3 Encounters:   03/10/17 95/69   17 111/72   17 108/64    Weight from Last 3 Encounters:   03/10/17 196 lb (88.9 kg)   17 196 lb (88.9 kg)   17 196 lb (88.9 kg)              Today, you had the following     No orders found for display         Today's Medication Changes        "   These changes are accurate as of: 3/10/17  9:45 AM.  If you have any questions, ask your nurse or doctor.               Start taking these medicines.        Dose/Directions    order for DME   Used for:  Edema, unspecified type   Started by:  Mai Babcock MD        Equipment being ordered: two pairs moderate knee high support hose   Quantity:  2 Device   Refills:  1       oxyCODONE 10 MG IR tablet   Commonly known as:  ROXICODONE   Used for:  Chronic low back pain without sciatica, unspecified back pain laterality   Started by:  Mai Babcock MD        Dose:  10 mg   Start taking on:  4/12/2017   Take 1 tablet (10 mg) by mouth every 8 hours as needed   Quantity:  90 tablet   Refills:  0            Where to get your medicines      These medications were sent to [facility] [address] [facility] [address]  [address] [facility] [address], [facility] [address]     Phone:  [phone]     budesonide-formoterol 80-4.5 MCG/ACT Inhaler    glucose-vitamin C 4-0.006 G Chew    levothyroxine 200 MCG tablet    losartan 100 MG tablet    vitamin D 2000 UNITS tablet         Some of these will need a paper prescription and others can be bought over the counter.  Ask your nurse if you have questions.     Bring a paper prescription for each of these medications     order for DME    oxyCODONE 10 MG IR tablet                Primary Care Provider Office Phone # Fax #    Mai Babcock [phone]       [facility] [address] [place] [address]  [facility] [address]        Thank you!     Thank you for choosing [facility]  for your care. Our goal is always to provide you with excellent care. Hearing back from our patients is one way we can continue to improve our services. Please take a few minutes to complete the written survey that you may receive in the mail after your visit with us. Thank you!             Your Updated Medication List - Protect  others around you: Learn how to safely use, store and throw away your medicines at www.disposemymeds.org.          This list is accurate as of: 3/10/17  9:45 AM.  Always use your most recent med list.                   Brand Name Dispense Instructions for use    albuterol 108 (90 BASE) MCG/ACT Inhaler    PROAIR HFA/PROVENTIL HFA/VENTOLIN HFA    18 g    Inhale 2 puffs into the lungs every 6 hours as needed for shortness of breath / dyspnea or wheezing       ASPIRIN LOW DOSE 81 MG EC tablet   Generic drug:  aspirin     30 tablet    Take 1 tablet (81 mg) by mouth daily       atorvastatin 40 MG tablet    LIPITOR    90 tablet    Take 1 tablet (40 mg) by mouth daily       blood glucose monitoring lancets     1 Box    Test BS four times daily as directed       blood glucose monitoring test strip    no brand specified    2 Box    1 strip by In Vitro route 2 times daily Freestyle Lite       budesonide-formoterol 80-4.5 MCG/ACT Inhaler    SYMBICORT    1 Inhaler    Inhale 2 puffs into the lungs 2 times daily       docusate sodium 100 MG capsule    COLACE    60 capsule    Take 1 capsule (100 mg) by mouth 2 times daily       EUCERIN CALMING DAILY MOIST Crea     1 Tube    Externally apply 1 dose * topically daily       ferrous sulfate 325 (65 FE) MG tablet    IRON    100 tablet    Take 1 tablet (325 mg) by mouth daily (with breakfast)       furosemide 20 MG tablet    LASIX    60 tablet    Take 1 tablet (20 mg) by mouth daily       glucose-vitamin C 4-0.006 G Chew    DEX4 GLUCOSE    50 tablet    Take 1 tablet (4 g) by mouth every hour as needed for low blood sugar       insulin glargine 100 UNIT/ML injection    LANTUS    15 mL    Inject 30 Units Subcutaneous every morning       insulin pen needle 31G X 6 MM     120 each    Use as directed       levothyroxine 200 MCG tablet    SYNTHROID/LEVOTHROID    90 tablet    Take 1 tablet (200 mcg) by mouth See Admin Instructions New daily increase. Recheck labs in 8 weeks.       losartan 100  MG tablet    COZAAR    90 tablet    Take 1 tablet (100 mg) by mouth daily       metoprolol 25 MG 24 hr tablet    TOPROL-XL    90 tablet    Take 1 tablet (25 mg) by mouth daily       nitroglycerin 0.4 MG sublingual tablet    NITROSTAT    25 tablet    Place 1 tablet (0.4 mg) under the tongue every 5 minutes as needed for chest pain       nystatin 400273 UNIT/GM Powd    MYCOSTATIN    30 g    Apply 1 g topically 3 times daily as needed       order for DME     2 Device    Equipment being ordered: two pairs moderate knee high support hose       oxyCODONE 10 MG IR tablet   Start taking on:  4/12/2017    ROXICODONE    90 tablet    Take 1 tablet (10 mg) by mouth every 8 hours as needed       polyethylene glycol powder    MIRALAX    510 g    Take 17 g by mouth daily       SM ALCOHOL PREP 70 % Pads          vitamin D 2000 UNITS tablet     60 tablet    Take 2,000 Units by mouth daily

## 2017-03-10 NOTE — PATIENT INSTRUCTIONS
Refills done today    Labs next visit with Dr GRIGGS in 2 months    2 months oxycodone scripts given    DME for support hose    Report if more episodes of increasing chest pain. Go to ER if not helped by nitro.

## 2017-03-10 NOTE — NURSING NOTE
"  Chief Complaint   Patient presents with     RECHECK       Initial BP 95/69  Pulse 70  Temp 98.4  F (36.9  C) (Oral)  Resp 20  Ht 5' 5\" (1.651 m)  Wt 196 lb (88.9 kg)  SpO2 97%  BMI 32.62 kg/m2 Estimated body mass index is 32.62 kg/(m^2) as calculated from the following:    Height as of this encounter: 5' 5\" (1.651 m).    Weight as of this encounter: 196 lb (88.9 kg).  Medication Reconciliation: complete     Anibal Anne MA      "

## 2017-03-21 DIAGNOSIS — E11.8 TYPE 2 DIABETES MELLITUS WITH COMPLICATION (H): ICD-10-CM

## 2017-03-21 RX ORDER — BLOOD-GLUCOSE METER
KIT MISCELLANEOUS
Qty: 50 STRIP | Refills: 12 | Status: SHIPPED | OUTPATIENT
Start: 2017-03-21 | End: 2017-05-10

## 2017-04-04 DIAGNOSIS — N18.4 CKD (CHRONIC KIDNEY DISEASE) STAGE 4, GFR 15-29 ML/MIN (H): Primary | ICD-10-CM

## 2017-04-04 NOTE — NURSING NOTE
Labs per clinic 2A protocol.  Follow up/CKD 4  Last OV: 1/27/17  Marian Almeida LPN  Nephrology  [facility]  [phone]

## 2017-04-06 ENCOUNTER — TELEPHONE (OUTPATIENT)
Dept: NEPHROLOGY | Facility: CLINIC | Age: 72
End: 2017-04-06

## 2017-04-06 NOTE — TELEPHONE ENCOUNTER
Marcelina, this is  office from Clinic 3B at the [facility]. We are calling to remind you of your upcoming Nephrology appointment on 4/18/17 at 940am. Please arrive about 1 hours prior to your appointment time for labs. Also, please bring an updated medication list or your labeled medication bottles with you to your appointment. If you have any questions or would like to cancel or reschedule your appointment, please call us at [phone].     You are welcome to have your labs done up to a week before your appointment at any [facility] facility. If you have your labs completed before your appointment, please still come 30 minutes early for check-in  ANNALEE SYED CMA

## 2017-04-11 ENCOUNTER — CARE COORDINATION (OUTPATIENT)
Dept: GERIATRIC MEDICINE | Facility: CLINIC | Age: 72
End: 2017-04-11

## 2017-04-20 ENCOUNTER — CARE COORDINATION (OUTPATIENT)
Dept: GERIATRIC MEDICINE | Facility: CLINIC | Age: 72
End: 2017-04-20

## 2017-04-20 NOTE — PROGRESS NOTES
Per Shelbi, arranged transportation thru [facility] for the below appt:  Appt Date: 4/24/17 @ 1:00pm  Clinic Name:  [facility] - [address] , [place] [address]  Transportation Provider: [employer]   time:  12:00pm - 12:30pm    Notified member of  time.    Justyna Mccain  Case Management Specialist  [facility]  [phone]

## 2017-04-25 ENCOUNTER — CARE COORDINATION (OUTPATIENT)
Dept: GERIATRIC MEDICINE | Facility: CLINIC | Age: 72
End: 2017-04-25

## 2017-04-25 NOTE — PROGRESS NOTES
[facility] Six-Month Telephone Assessment    6 month telephone assessment completed on 4/25/17.    ER visits: No  Hospitalizations: No  TCU stays: No  Significant health status changes: denies changes  Falls/Injuries: No  ADL/IADL changes: No  Changes in services: No    Caregiver Assessment follow up:  n/a      Member states she is interested in a PCA.  CM reviewed ALDs.  Member states is independent with all.  Member states she needs help with things around her house- cooking and cleaning.  Education provided that those are homemaking tasks.  CM offers home EW assessment.  CM offers 2 dates next week for home visit.  Member states she will call CM back for scheduling.      Will see client in 6 months for an annual health risk assessment.   Encouraged client to call CM with any questions or concerns in the meantime.     Batool Mai RN, BSN, PHN  [facility]  [phone]  Fax: [phone]

## 2017-05-08 NOTE — PROGRESS NOTES
SUBJECTIVE:                                                    Kim Anne is a 71 year old female who presents to clinic today for the following health issues:  MTM Pratibha Marie information for getting help at home    Patient Description:  female glasses with Diabetes        Medication Followup of All     Taking Medication as prescribed: yes    Side Effects:  None    Medication Helping Symptoms:  yes     Some wheezing still. Pratibha did refills for this.  Cab today  Diabetes Follow-up  A1C goal: <7    Lab Results   Component Value Date    A1C 6.3 12/13/2016    A1C 7.2 10/11/2016    A1C 9.1 08/29/2016    A1C 7.2 06/30/2016    A1C 6.8 12/09/2015     TSH   Date Value Ref Range Status   11/03/2016 1.28 0.40 - 4.00 mU/L Final   Last 2 weeks blood sugars by patient report:  Low 89  High 239 drank real pop  Today not checked      Patient is checking blood sugars: 1-2    Diabetic concerns: None     Symptoms of hypoglycemia (low blood sugar): none     Paresthesias (numbness or burning in feet) or sores: No     Diabetic eye exam within the last year: Yes due     Hyperlipidemia Follow-Up  LDL goal: <100    LDL Cholesterol Calculated   Date Value Ref Range Status   12/20/2013 109 0 - 129 mg/dL Final     Comment:     LDL Cholesterol is the primary guide to therapy: LDL-cholesterol goal in high   risk patients is <100 mg/dL and in very high risk patients is <70 mg/dL.     LDL Cholesterol Direct   Date Value Ref Range Status   08/29/2016 85 <100 mg/dL Final     Comment:     Desirable:       <100 mg/dl   ]  Recheck fasting today    Rate your low fat/cholesterol diet?: good    Taking statin?  Yes, no muscle aches from statin    Other lipid medications/supplements?:  none     Hypertension Follow-up  BP goal: <140/90 (could change to 150/90 due to age if needed)    BP Readings from Last 3 Encounters:   05/10/17 130/68   03/10/17 95/69   02/23/17 111/72     Last Basic Metabolic Panel:  Lab Results    Component Value Date     02/13/2017      Lab Results   Component Value Date    POTASSIUM 5.8 02/13/2017     Lab Results   Component Value Date    CHLORIDE 115 02/13/2017     Lab Results   Component Value Date    FRANCES 8.3 02/13/2017     Lab Results   Component Value Date    CO2 21 02/13/2017     Lab Results   Component Value Date    BUN 27 02/13/2017     Lab Results   Component Value Date    CR 2.62 02/13/2017     Lab Results   Component Value Date     02/13/2017         Outpatient blood pressures are not being checked.    Low Salt Diet: no added salt     Depression Followup    Status since last visit: Not a current issue     See PHQ-9 for current symptoms.  Other associated symptoms: None    Complicating factors:   Significant life event:  No   Current substance abuse:  None  Anxiety or Panic symptoms:  No  MENTAL STATUS EXAM  Appearance: Appears stated age, dressed and groomed appropriately for the visit today.  Attitude: cooperative  Behavior: normal  Eye Contact: normal  Speech: normal  Orientation: oriented to person, place, time and situation  Mood:  Admits no sadness and anxiety most of time  Affect: Mood Congruent  Thought Process: clear  Suicidal Ideation: reports no thoughts, no intention  Hallucination: no  Paranoid-no  Manic-no  Panic-no  Self harm-no  OCD-no  Gambling-no  Excessive shopping no  Legal no      Sleep - 6 hours, nocturia x 3  Appetite - ok  2meals/day, snacks  Exercise - no    Recent falls - no  Recent blackouts - no  Seizures - no    Smoking - yes 10 cig per day Reviewed cardiac risk calculator. She wants to quit again.  She will try Chantix. Did not have success with patch in the past.  First cig of day 1/2 hour after out of bed with coffee, 2 hours later, after lunch, 2 hours and after dinner, before bed. When up for bathroom then smokes.  Alcohol - no  Street drugs - no  Marijuana - no  Caffeine - no    Any ER/UC or hospital visits in the last 2 weeks? - no  Eye exam up to  date? - due  Dental visit up to date? - no issues      PHQ-9  English PHQ-9   Any Language             Amount of exercise or physical activity: none    Problems taking medications regularly: No    Medication side effects: none    Diet: low salt, low fat/cholesterol and diabetic    Social History   Substance Use Topics     Smoking status: Current Every Day Smoker     Packs/day: 0.10     Years: 40.00     Types: Cigarettes     Last attempt to quit: 10/31/2016     Smokeless tobacco: Never Used     Alcohol use No      Problem list and histories reviewed & adjusted, as indicated.  Additional history: as documented    Patient Active Problem List   Diagnosis     Hyperlipidemia LDL goal <100     ARAUZ (dyspnea on exertion)     COPD (chronic obstructive pulmonary disease) (H)     Edema     Fatigue     History of MI (myocardial infarction)     Snores     Knee pain, left     Bunion of great toe of left foot     Dystrophic nail     Arthritis     Peripheral vascular disease (H)     Chronic low back pain     Health Care Home     CKD (chronic kidney disease) stage 4, GFR 15-29 ml/min (H)     Abnormal gait     Advance Care Planning     Vitamin D deficiency     Constipation     Tobacco use disorder     Hypertension goal BP (blood pressure) < 130/80     Bradycardia     Callus of foot     Other chronic pain     Cataracts, bilateral     Hyperopic astigmatism of both eyes     Presbyopia     Asymptomatic bunion, right     Acquired hypothyroidism     Type 2 diabetes mellitus with diabetic chronic kidney disease (H)     Hypertension associated with diabetes (H)     Hyperlipidemia associated with type 2 diabetes mellitus (H)     Obesity (BMI 30-39.9)     History of sick sinus syndrome     Nephrotic syndrome     Pneumonia     Grief     Past Surgical History:   Procedure Laterality Date     APPENDECTOMY       BLEPHAROPLASTY BILATERAL  9/18/2013    Procedure: BLEPHAROPLASTY BILATERAL;  BILATERAL UPPER EYELID BLEPHAROPLASTY ;  Surgeon: Camron  MD Olayinka;  Location: [facility]     CHOLECYSTECTOMY       COLONOSCOPY  7/15/2011    polyps repeat in 5 years     elected term pregnancy       HYSTEROSCOPIC PLACEMENT CONTRACEPTIVE DEVICE       KIDNEY SURGERY      donated left kideny     OVARY SURGERY      left for cyst benign     subclavian stent  2010    [facility]       Social History   Substance Use Topics     Smoking status: Current Every Day Smoker     Packs/day: 0.10     Years: 40.00     Types: Cigarettes     Last attempt to quit: 10/31/2016     Smokeless tobacco: Never Used     Alcohol use No     Family History   Problem Relation Age of Onset     DIABETES Mother      brother, MGM, sister     KIDNEY DISEASE Brother      X2 DM two      Alcohol/Drug Child      daughter     Asthma No family hx of      C.A.D. No family hx of      Hypertension No family hx of      CEREBROVASCULAR DISEASE No family hx of      Breast Cancer No family hx of      Cancer - colorectal No family hx of      Prostate Cancer No family hx of      Allergies No family hx of      Alzheimer Disease No family hx of      Anesthesia Reaction No family hx of      Arthritis No family hx of      Blood Disease No family hx of      CANCER No family hx of      Cardiovascular No family hx of      Circulatory No family hx of      Congenital Anomalies No family hx of      Connective Tissue Disorder No family hx of      Depression No family hx of      Eye Disorder No family hx of      Genetic Disorder No family hx of      GASTROINTESTINAL DISEASE No family hx of      Genitourinary Problems No family hx of      Gynecology No family hx of      HEART DISEASE No family hx of      Lipids No family hx of      Musculoskeletal Disorder No family hx of      Neurologic Disorder No family hx of      Obesity No family hx of      OSTEOPOROSIS No family hx of      Psychotic Disorder No family hx of      Respiratory No family hx of      Thyroid Disease No family hx of      Glaucoma No family hx of      Macular  Degeneration No family hx of          Current Outpatient Prescriptions   Medication Sig Dispense Refill     FREESTYLE LITE test strip TEST BLOOD SUGAR TWO TIMES A DAY 50 strip 12     oxyCODONE (ROXICODONE) 10 MG IR tablet Take 1 tablet (10 mg) by mouth every 8 hours as needed 90 tablet 0     budesonide-formoterol (SYMBICORT) 80-4.5 MCG/ACT Inhaler Inhale 2 puffs into the lungs 2 times daily 1 Inhaler 1     glucose-vitamin C (DEX4 GLUCOSE) 4-0.006 G CHEW Take 1 tablet (4 g) by mouth every hour as needed for low blood sugar 50 tablet 3     losartan (COZAAR) 100 MG tablet Take 1 tablet (100 mg) by mouth daily 90 tablet 1     levothyroxine (SYNTHROID/LEVOTHROID) 200 MCG tablet Take 1 tablet (200 mcg) by mouth See Admin Instructions New daily increase. Recheck labs in 8 weeks. 90 tablet 1     Cholecalciferol (VITAMIN D) 2000 UNITS tablet Take 2,000 Units by mouth daily 60 tablet 2     order for DME Equipment being ordered: two pairs moderate knee high support hose 2 Device 1     insulin glargine (LANTUS) 100 UNIT/ML injection Inject 30 Units Subcutaneous every morning 15 mL 3     atorvastatin (LIPITOR) 40 MG tablet Take 1 tablet (40 mg) by mouth daily 90 tablet 1     docusate sodium (COLACE) 100 MG capsule Take 1 capsule (100 mg) by mouth 2 times daily 60 capsule 4     metoprolol (TOPROL-XL) 25 MG 24 hr tablet Take 1 tablet (25 mg) by mouth daily 90 tablet 1     albuterol (PROAIR HFA/PROVENTIL HFA/VENTOLIN HFA) 108 (90 BASE) MCG/ACT Inhaler Inhale 2 puffs into the lungs every 6 hours as needed for shortness of breath / dyspnea or wheezing 18 g 3     nystatin (MYCOSTATIN) 124103 UNIT/GM POWD Apply 1 g topically 3 times daily as needed 30 g 1     furosemide (LASIX) 20 MG tablet Take 1 tablet (20 mg) by mouth daily 60 tablet 11     Alcohol Swabs (SM ALCOHOL PREP) 70 % PADS        insulin pen needle 31G X 6 MM Use as directed 120 each 3     ASPIRIN LOW DOSE 81 MG EC tablet Take 1 tablet (81 mg) by mouth daily 30 tablet 11      ferrous sulfate (IRON) 325 (65 FE) MG tablet Take 1 tablet (325 mg) by mouth daily (with breakfast) 100 tablet 1     blood glucose monitoring (FREESTYLE) lancets Test BS four times daily as directed 1 Box 12     polyethylene glycol (MIRALAX) powder Take 17 g by mouth daily 510 g 2     nitroglycerin (NITROSTAT) 0.4 MG SL tablet Place 1 tablet (0.4 mg) under the tongue every 5 minutes as needed for chest pain 25 tablet 0     Emollient (EUCERIN CALMING DAILY MOIST) CREA Externally apply 1 dose * topically daily 1 Tube 12     Allergies   Allergen Reactions     Contrast Dye Hives and Itching     Clonidine      She had as IP and thinks it made her itchy     Diatrizoate Other (See Comments)     Diltiazem      Severe bradycardia     Hydralazine      [place] tab patient thought made her itchy so stopped     Iodine-131      Recent Labs   Lab Test  02/13/17   1102  01/27/17   0951   12/13/16   1036   11/03/16   0545   11/02/16   0622  11/01/16   2335  10/11/16   0842  08/29/16   1652   12/09/15   0946   03/04/15   0944   12/20/13   1022   10/19/11   1111  08/19/11   0945   A1C   --    --    --   6.3*   --    --    --    --    --   7.2*  9.1*   < >  6.8*   < >  6.9*   < >  6.7*   < >   --   6.9*   LDL   --    --    --    --    --    --    --    --    --    --   85   --   72   --   99   < >  109   < >  85  109   HDL   --    --    --    --    --    --    --    --    --    --    --    --    --    --    --    --   43*   --   49*  49*   TRIG   --    --    --    --    --    --    --    --    --    --    --    --    --    --    --    --   331*   --   175*  254*   ALT   --    --    --    --    --    --    --   21  27   --   23   --   27   < >   --    < >   --    < >  14   --    CR  2.62*  2.47*   < >   --    < >  2.39*   < >  2.64*  2.53*  2.24*  2.17*   < >  1.75*   < >  1.59*   < >   --    < >   --   1.20*   GFRESTIMATED  18*  19*   < >   --    < >  20*   < >  18*  19*  22*  22*   < >  29*   < >  32*   < >   --    < >   --   45*  "  GFRESTBLACK  22*  23*   < >   --    < >  24*   < >  22*  23*  26*  27*   < >  35*   < >  39*   < >   --    < >   --   55*   POTASSIUM  5.8*  5.4*   < >   --    < >  4.5   < >  5.6*  5.3  5.5*  5.2   < >  4.9   < >  5.1   < >   --    < >   --   4.2   TSH   --    --    --    --    --   1.28   --    --    --   17.25*  13.11*   --   1.24   < >   --    < >   --    < >  6.82*  36.00*    < > = values in this interval not displayed.      BP Readings from Last 3 Encounters:   05/10/17 130/68   03/10/17 95/69   02/23/17 111/72    Wt Readings from Last 3 Encounters:   05/10/17 190 lb 8 oz (86.4 kg)   03/10/17 196 lb (88.9 kg)   02/23/17 196 lb (88.9 kg)            Labs reviewed in EPIC  Problem list, Medication list, Allergies, and Medical/Social/Surgical histories reviewed in [facility] and updated as appropriate.    ROS: 10 point ROS neg other than the symptoms noted above in the HPI.    Having fun at [facility]  OBJECTIVE:                                                    /68  Pulse 60  Temp 98.5  F (36.9  C) (Oral)  Resp 16  Ht 5' 5\" (1.651 m)  Wt 190 lb 8 oz (86.4 kg)  SpO2 97%  BMI 31.7 kg/m2  There is no height or weight on file to calculate BMI.  GENERAL:   female with glasses, healthy, alert and no distress Smoker [place] dentures  EYES: Eyes grossly normal to inspection, extraocular movements - intact, and PERRL  HENT: ear canals- normal; TMs- normal; Nose- normal; Mouth- no ulcers, no lesions  NECK: no tenderness, no adenopathy, no asymmetry, no masses, no stiffness; thyroid- normal to palpation  RESP: lungs clear to auscultation - no rales, no rhonchi, no wheezes  CV: regular rates and rhythm, normal S1 S2, no S3 or S4 and no murmur, no click or rub -yes edema no casimiro/cords  ABDOMEN: soft, no tenderness, no  hepatosplenomegaly, no masses, normal bowel sounds  MS: extremities- no gross deformities noted yes edema  SKIN: no suspicious lesions, no rashes  NEURO: strength and tone- normal, sensory " exam- grossly normal, mentation- intact, speech- normal, reflexes- symmetric  Non focal no aphasia. No facial asymmetry. Finger to nose, rapid alteration, finger thumb opposition, heel knee shin are normal.  Diabetic foot exam: patient declines  BACK: no CVA tenderness, no paralumbar tenderness  PSYCH: Alert and oriented times 3; speech- coherent , normal rate and volume; able to articulate logical thoughts, able to abstract reason, no tangential thoughts, no hallucinations or delusions, affect- normal  LYMPHATICS: ant. cervical- normal, post. cervical- normal, axillary- normal, supraclavicular- normal, inguinal- normal       ASSESSMENT/PLAN:                                                      ASSESSMENT / PLAN:  (E78.5) Hyperlipidemia LDL goal <100  (primary encounter diagnosis)  Comment: fasting  Plan: Lipid Profile            (N18.4) CKD (chronic kidney disease) stage 4, GFR 15-29 ml/min (H)  Comment:   Plan: lab today    (J44.9) Chronic obstructive pulmonary disease, unspecified COPD type (H)  Comment:   Plan: continue cares, stop smoking    (R60.9) Edema, unspecified type  Comment:   Plan: Continue support hose, lasix, elevation. Increase exercise once you get your walker    (I25.2) History of MI (myocardial infarction)  Comment: no CP today  Plan: Continue cares    (I73.9) Peripheral vascular disease (H)  Comment:   Plan: Increase exercise once you get your walker    (M54.5,  G89.29) Chronic low back pain, unspecified back pain laterality, with sciatica presence unspecified  Comment: 4/10 pain today Increase exercise once you get your walker  Plan: Urine Drugs of Abuse Screen Panel 13        Oxycodone refill done if Tox13 is ok. [place] reviewed    (Z91.81) Risk for falls  Comment: Increase exercise once you get your walker  Plan: order for DME            (E11.22,  N18.4,  Z79.4) Type 2 diabetes mellitus with stage 4 chronic kidney disease, with long-term current use of insulin (H)  Comment:  stable  Plan: Hemoglobin A1c, OPHTHALMOLOGY ADULT REFERRAL            (Z71.6,  Z72.0) Encounter for smoking cessation counseling  Comment: we spent time reviewing Cardiac Risk Calculator with and without smoking. She could see the impact of quitting there and wants to try again. She tried the patch in the past and did not like it. She has dentures so gum is not an option. We discussed the behavioral component of smoking.  Plan: varenicline (CHANTIX STARTING MONTH PAK) 0.5 MG        X 11 & 1 MG X 42 tablet            (M54.5,  G89.29) Chronic low back pain without sciatica, unspecified back pain laterality  Comment: 4/10 pain today  Plan: oxyCODONE (ROXICODONE) 10 MG IR tablet            (E11.59,  I10) Hypertension associated with diabetes (H)  Comment: at goal  Plan: Continue cares    (E11.69,  E78.5) Hyperlipidemia associated with type 2 diabetes mellitus (H)  Comment: fast  Plan: labs today    (E03.9) Acquired hypothyroidism  Comment:   TSH   Date Value Ref Range Status   11/03/2016 1.28 0.40 - 4.00 mU/L Final   ]    Plan: Continue cares        Patient Instructions:  Pratibha did your refills    Chantix starter pack for smoking cessation.   You will try to have your coffee without cigarette.  Smoking Cessation Ideas    1. Consider hiding your cigarettes in an inconvenient place like the freezer, the trunk of your car etc.    2. Consider using gum, candy, toothpicks instead. Many smokers are oral people who like things in their mouth and hands.    3. Consider using an elastic/rubber band and snapping it when you want a cigarette to remind you to pause and consider if you want to make the choice to smoke. This is also a negative feedback tool.    4. Consider cutting the cigarette in half.    5. Consider portioning out your allotment for the day in a ziplock bag so that you can see what your limit is for the day. Then put the pack somewhere inconvenient.    6. Set a quit date.    7. Consider when and where and who you  "smoke with. Consider changing those behaviors. For example \"everytime I have coffee, I smoke\" try and separate these events from each other.    8. Consider setting longer time limits before your first or next cigarette. You could use a timer if needed.      Re-establish care with Ailyn/Pratibha/ ABDIRIZAK 1 month.    Pain medication refill today      Mai Babcock MD  [facility]  40 min spent in direct face to face time with this pt discussing DM/BP/Lipid/Smoking cessation/Chronic pain and others described above, greater than 50% in counseling and coordination of care.    She declined mammogram  Nica to set up eye exam again  "

## 2017-05-09 ENCOUNTER — CARE COORDINATION (OUTPATIENT)
Dept: GERIATRIC MEDICINE | Facility: CLINIC | Age: 72
End: 2017-05-09

## 2017-05-09 NOTE — PROGRESS NOTES
Per Ruby, arranged transportation thru [facility] for the below appt:  Appt Date: 5/10/17 @ 9:00 am  Clinic Name:  [facility] - [address] [place] [address], [id number], [place] [address]  Transportation Provider: [employer]   time:  8:00 am    Notified Prashant of  time.      Brianna Barrios  Case Management Specialist  [facility]   [phone]

## 2017-05-10 ENCOUNTER — OFFICE VISIT (OUTPATIENT)
Dept: PHARMACY | Facility: CLINIC | Age: 72
End: 2017-05-10
Payer: COMMERCIAL

## 2017-05-10 ENCOUNTER — CARE COORDINATION (OUTPATIENT)
Dept: GERIATRIC MEDICINE | Facility: CLINIC | Age: 72
End: 2017-05-10

## 2017-05-10 ENCOUNTER — OFFICE VISIT (OUTPATIENT)
Dept: FAMILY MEDICINE | Facility: CLINIC | Age: 72
End: 2017-05-10
Payer: MEDICARE

## 2017-05-10 VITALS
WEIGHT: 190.5 LBS | RESPIRATION RATE: 16 BRPM | HEART RATE: 60 BPM | SYSTOLIC BLOOD PRESSURE: 130 MMHG | DIASTOLIC BLOOD PRESSURE: 68 MMHG | TEMPERATURE: 98.5 F | BODY MASS INDEX: 31.74 KG/M2 | OXYGEN SATURATION: 97 % | HEIGHT: 65 IN

## 2017-05-10 DIAGNOSIS — Z79.4 TYPE 2 DIABETES MELLITUS WITHOUT COMPLICATION, WITH LONG-TERM CURRENT USE OF INSULIN (H): ICD-10-CM

## 2017-05-10 DIAGNOSIS — R21 GROIN RASH: ICD-10-CM

## 2017-05-10 DIAGNOSIS — G89.29 CHRONIC LOW BACK PAIN WITHOUT SCIATICA, UNSPECIFIED BACK PAIN LATERALITY: ICD-10-CM

## 2017-05-10 DIAGNOSIS — E78.5 HYPERLIPIDEMIA LDL GOAL <100: Primary | ICD-10-CM

## 2017-05-10 DIAGNOSIS — E11.22 TYPE 2 DIABETES MELLITUS WITH STAGE 4 CHRONIC KIDNEY DISEASE, WITH LONG-TERM CURRENT USE OF INSULIN (H): ICD-10-CM

## 2017-05-10 DIAGNOSIS — I15.2 HYPERTENSION ASSOCIATED WITH DIABETES (H): ICD-10-CM

## 2017-05-10 DIAGNOSIS — E78.5 HYPERLIPIDEMIA LDL GOAL <100: ICD-10-CM

## 2017-05-10 DIAGNOSIS — E03.8 OTHER SPECIFIED HYPOTHYROIDISM: ICD-10-CM

## 2017-05-10 DIAGNOSIS — G89.29 CHRONIC LOW BACK PAIN, UNSPECIFIED BACK PAIN LATERALITY, WITH SCIATICA PRESENCE UNSPECIFIED: ICD-10-CM

## 2017-05-10 DIAGNOSIS — R60.9 EDEMA, UNSPECIFIED TYPE: ICD-10-CM

## 2017-05-10 DIAGNOSIS — E78.5 HYPERLIPIDEMIA ASSOCIATED WITH TYPE 2 DIABETES MELLITUS (H): ICD-10-CM

## 2017-05-10 DIAGNOSIS — E11.9 TYPE 2 DIABETES MELLITUS WITHOUT COMPLICATION, WITH LONG-TERM CURRENT USE OF INSULIN (H): ICD-10-CM

## 2017-05-10 DIAGNOSIS — I25.2 HISTORY OF MI (MYOCARDIAL INFARCTION): ICD-10-CM

## 2017-05-10 DIAGNOSIS — N18.4 TYPE 2 DIABETES MELLITUS WITH STAGE 4 CHRONIC KIDNEY DISEASE, WITH LONG-TERM CURRENT USE OF INSULIN (H): ICD-10-CM

## 2017-05-10 DIAGNOSIS — J44.9 CHRONIC OBSTRUCTIVE PULMONARY DISEASE, UNSPECIFIED COPD TYPE (H): ICD-10-CM

## 2017-05-10 DIAGNOSIS — D50.9 IRON DEFICIENCY ANEMIA, UNSPECIFIED: ICD-10-CM

## 2017-05-10 DIAGNOSIS — Z91.81 RISK FOR FALLS: ICD-10-CM

## 2017-05-10 DIAGNOSIS — I25.10 CORONARY ARTERY DISEASE INVOLVING NATIVE CORONARY ARTERY OF NATIVE HEART WITHOUT ANGINA PECTORIS: ICD-10-CM

## 2017-05-10 DIAGNOSIS — Z79.4 TYPE 2 DIABETES MELLITUS WITH STAGE 4 CHRONIC KIDNEY DISEASE, WITH LONG-TERM CURRENT USE OF INSULIN (H): ICD-10-CM

## 2017-05-10 DIAGNOSIS — N18.4 CKD (CHRONIC KIDNEY DISEASE) STAGE 4, GFR 15-29 ML/MIN (H): ICD-10-CM

## 2017-05-10 DIAGNOSIS — I73.9 PERIPHERAL VASCULAR DISEASE (H): ICD-10-CM

## 2017-05-10 DIAGNOSIS — M54.50 CHRONIC LOW BACK PAIN WITHOUT SCIATICA, UNSPECIFIED BACK PAIN LATERALITY: ICD-10-CM

## 2017-05-10 DIAGNOSIS — M54.5 CHRONIC LOW BACK PAIN, UNSPECIFIED BACK PAIN LATERALITY, WITH SCIATICA PRESENCE UNSPECIFIED: ICD-10-CM

## 2017-05-10 DIAGNOSIS — E11.69 HYPERLIPIDEMIA ASSOCIATED WITH TYPE 2 DIABETES MELLITUS (H): ICD-10-CM

## 2017-05-10 DIAGNOSIS — K59.01 SLOW TRANSIT CONSTIPATION: ICD-10-CM

## 2017-05-10 DIAGNOSIS — I15.2 HYPERTENSION ASSOCIATED WITH DIABETES (H): Primary | ICD-10-CM

## 2017-05-10 DIAGNOSIS — E11.59 HYPERTENSION ASSOCIATED WITH DIABETES (H): Primary | ICD-10-CM

## 2017-05-10 DIAGNOSIS — E11.59 HYPERTENSION ASSOCIATED WITH DIABETES (H): ICD-10-CM

## 2017-05-10 DIAGNOSIS — E03.9 ACQUIRED HYPOTHYROIDISM: ICD-10-CM

## 2017-05-10 DIAGNOSIS — Z71.6 ENCOUNTER FOR SMOKING CESSATION COUNSELING: ICD-10-CM

## 2017-05-10 LAB
ALBUMIN SERPL-MCNC: 1.8 G/DL (ref 3.4–5)
AMPHETAMINES UR QL: ABNORMAL NG/ML
ANION GAP SERPL CALCULATED.3IONS-SCNC: 8 MMOL/L (ref 3–14)
BARBITURATES UR QL SCN: ABNORMAL NG/ML
BENZODIAZ UR QL SCN: ABNORMAL NG/ML
BUN SERPL-MCNC: 23 MG/DL (ref 7–30)
BUPRENORPHINE UR QL: ABNORMAL NG/ML
CALCIUM SERPL-MCNC: 8 MG/DL (ref 8.5–10.1)
CANNABINOIDS UR QL: ABNORMAL NG/ML
CHLORIDE SERPL-SCNC: 118 MMOL/L (ref 94–109)
CHOLEST SERPL-MCNC: 223 MG/DL
CO2 SERPL-SCNC: 19 MMOL/L (ref 20–32)
COCAINE UR QL SCN: ABNORMAL NG/ML
CREAT SERPL-MCNC: 2.25 MG/DL (ref 0.52–1.04)
CREAT UR-MCNC: 147 MG/DL
D-METHAMPHET UR QL: ABNORMAL NG/ML
ERYTHROCYTE [DISTWIDTH] IN BLOOD BY AUTOMATED COUNT: 15.6 % (ref 10–15)
GFR SERPL CREATININE-BSD FRML MDRD: 21 ML/MIN/1.7M2
GLUCOSE SERPL-MCNC: 142 MG/DL (ref 70–99)
HBA1C MFR BLD: 6.6 % (ref 4.3–6)
HCT VFR BLD AUTO: 41.4 % (ref 35–47)
HDLC SERPL-MCNC: 66 MG/DL
HGB BLD-MCNC: 12.8 G/DL (ref 11.7–15.7)
LDLC SERPL CALC-MCNC: 96 MG/DL
MCH RBC QN AUTO: 28.7 PG (ref 26.5–33)
MCHC RBC AUTO-ENTMCNC: 30.9 G/DL (ref 31.5–36.5)
MCV RBC AUTO: 93 FL (ref 78–100)
METHADONE UR QL SCN: ABNORMAL NG/ML
NONHDLC SERPL-MCNC: 157 MG/DL
OPIATES UR QL SCN: ABNORMAL NG/ML
OXYCODONE UR QL SCN: ABNORMAL NG/ML
PCP UR QL SCN: ABNORMAL NG/ML
PHOSPHATE SERPL-MCNC: 3.8 MG/DL (ref 2.5–4.5)
PLATELET # BLD AUTO: 322 10E9/L (ref 150–450)
POTASSIUM SERPL-SCNC: 5.5 MMOL/L (ref 3.4–5.3)
PROPOXYPH UR QL: ABNORMAL NG/ML
PROT UR-MCNC: 20.68 G/L
PROT/CREAT 24H UR: 14.07 G/G CR (ref 0–0.2)
RBC # BLD AUTO: 4.46 10E12/L (ref 3.8–5.2)
SODIUM SERPL-SCNC: 145 MMOL/L (ref 133–144)
TRICYCLICS UR QL SCN: ABNORMAL NG/ML
TRIGL SERPL-MCNC: 303 MG/DL
WBC # BLD AUTO: 7.2 10E9/L (ref 4–11)

## 2017-05-10 PROCEDURE — 80306 DRUG TEST PRSMV INSTRMNT: CPT | Performed by: INTERNAL MEDICINE

## 2017-05-10 PROCEDURE — 99607 MTMS BY PHARM ADDL 15 MIN: CPT | Mod: GY | Performed by: PHARMACIST

## 2017-05-10 PROCEDURE — 85027 COMPLETE CBC AUTOMATED: CPT | Performed by: FAMILY MEDICINE

## 2017-05-10 PROCEDURE — 80069 RENAL FUNCTION PANEL: CPT | Performed by: FAMILY MEDICINE

## 2017-05-10 PROCEDURE — 99606 MTMS BY PHARM EST 15 MIN: CPT | Mod: GY | Performed by: PHARMACIST

## 2017-05-10 PROCEDURE — 80061 LIPID PANEL: CPT | Performed by: FAMILY MEDICINE

## 2017-05-10 PROCEDURE — 83036 HEMOGLOBIN GLYCOSYLATED A1C: CPT | Performed by: FAMILY MEDICINE

## 2017-05-10 PROCEDURE — 84156 ASSAY OF PROTEIN URINE: CPT | Performed by: INTERNAL MEDICINE

## 2017-05-10 PROCEDURE — 36415 COLL VENOUS BLD VENIPUNCTURE: CPT | Performed by: FAMILY MEDICINE

## 2017-05-10 PROCEDURE — 99215 OFFICE O/P EST HI 40 MIN: CPT | Performed by: FAMILY MEDICINE

## 2017-05-10 RX ORDER — ALBUTEROL SULFATE 90 UG/1
2 AEROSOL, METERED RESPIRATORY (INHALATION) EVERY 6 HOURS PRN
Qty: 18 G | Refills: 3 | Status: SHIPPED | OUTPATIENT
Start: 2017-05-10 | End: 2017-09-28

## 2017-05-10 RX ORDER — METOPROLOL SUCCINATE 25 MG/1
25 TABLET, EXTENDED RELEASE ORAL DAILY
Qty: 90 TABLET | Refills: 1 | Status: SHIPPED | OUTPATIENT
Start: 2017-05-10 | End: 2018-08-16

## 2017-05-10 RX ORDER — POLYETHYLENE GLYCOL 3350 17 G/17G
1 POWDER, FOR SOLUTION ORAL DAILY
Qty: 510 G | Refills: 2 | Status: SHIPPED | OUTPATIENT
Start: 2017-05-10 | End: 2018-04-13

## 2017-05-10 RX ORDER — LANCETS 28 GAUGE
EACH MISCELLANEOUS
Qty: 1 BOX | Refills: 12 | Status: SHIPPED | OUTPATIENT
Start: 2017-05-10 | End: 2018-05-21

## 2017-05-10 RX ORDER — BUDESONIDE AND FORMOTEROL FUMARATE DIHYDRATE 80; 4.5 UG/1; UG/1
2 AEROSOL RESPIRATORY (INHALATION) 2 TIMES DAILY
Qty: 1 INHALER | Refills: 1 | Status: SHIPPED | OUTPATIENT
Start: 2017-05-10 | End: 2017-09-28

## 2017-05-10 RX ORDER — ISOPROPYL ALCOHOL 0.7 ML/1
1 SWAB TOPICAL 4 TIMES DAILY
Qty: 100 EACH | Refills: 11 | Status: SHIPPED | OUTPATIENT
Start: 2017-05-10 | End: 2018-08-10

## 2017-05-10 RX ORDER — LEVOTHYROXINE SODIUM 200 UG/1
200 TABLET ORAL SEE ADMIN INSTRUCTIONS
Qty: 90 TABLET | Refills: 1 | Status: SHIPPED | OUTPATIENT
Start: 2017-05-10 | End: 2018-02-26

## 2017-05-10 RX ORDER — OXYCODONE HYDROCHLORIDE 10 MG/1
10 TABLET ORAL EVERY 8 HOURS PRN
Qty: 90 TABLET | Refills: 0 | Status: SHIPPED | OUTPATIENT
Start: 2017-05-10 | End: 2017-06-08

## 2017-05-10 RX ORDER — ATORVASTATIN CALCIUM 40 MG/1
40 TABLET, FILM COATED ORAL DAILY
Qty: 90 TABLET | Refills: 1 | Status: SHIPPED | OUTPATIENT
Start: 2017-05-10 | End: 2018-01-15

## 2017-05-10 RX ORDER — FERROUS SULFATE 325(65) MG
1 TABLET ORAL
Qty: 100 TABLET | Refills: 1 | Status: SHIPPED | OUTPATIENT
Start: 2017-05-10 | End: 2017-10-31

## 2017-05-10 RX ORDER — NYSTATIN 100000 [USP'U]/G
1 POWDER TOPICAL 3 TIMES DAILY PRN
Qty: 30 G | Refills: 1 | Status: ON HOLD | OUTPATIENT
Start: 2017-05-10 | End: 2020-02-01

## 2017-05-10 ASSESSMENT — PAIN SCALES - GENERAL: PAINLEVEL: MILD PAIN (2)

## 2017-05-10 NOTE — PATIENT INSTRUCTIONS
"Pratibha did your refills    Chantix starter pack for smoking cessation.   You will try to have your coffee without cigarette.  Smoking Cessation Ideas    1. Consider hiding your cigarettes in an inconvenient place like the freezer, the trunk of your car etc.    2. Consider using gum, candy, toothpicks instead. Many smokers are oral people who like things in their mouth and hands.    3. Consider using an elastic/rubber band and snapping it when you want a cigarette to remind you to pause and consider if you want to make the choice to smoke. This is also a negative feedback tool.    4. Consider cutting the cigarette in half.    5. Consider portioning out your allotment for the day in a ziplock bag so that you can see what your limit is for the day. Then put the pack somewhere inconvenient.    6. Set a quit date.    7. Consider when and where and who you smoke with. Consider changing those behaviors. For example \"everytime I have coffee, I smoke\" try and separate these events from each other.    8. Consider setting longer time limits before your first or next cigarette. You could use a timer if needed.      Re-establish care with Ailyn/rPatibha/ ABDIRIZAK 1 month.    Pain medication refill today  "

## 2017-05-10 NOTE — PROGRESS NOTES
SUBJECTIVE/OBJECTIVE:                                                    Kim Anne is a 71 year old female coming in for a follow-up visit for Medication Therapy Management.  She was referred to me from Mai Babcock.     Chief Complaint: HTN follow-up. Last MTM visit on 2/17/17.    Social: Son is on life support. He did a lot of things around the house (asting sort of like a PCA)    Medication Adherence: history of med compliance issues - says that things have improved since she started using the pill boxes. Asking for refills today on all meds that need refills.    Hypertension/CAD: Current medications include furosemide 20mg once per day, losartan 100mg daily and metoprolol succinate 25mg daily. Patient does not self-monitor BP. Also has NTG SL on-hand for chest pain, hasn't used lately. Patient reports no current medication side effects.    Diabetes:  Pt currently taking Lantus 30 units daily. Also has glucose tablets on-hand for hypoglycemia, though she usually just drinks juice when her BGs are low. Pt is experiencing the following side effects:  none  SMBG: two times daily.   Ranges (patient reported): 89-239mg/dL  Symptoms of low blood sugar? Shakiness, confusion. Frequency of hypoglycemia? Rare since dose decrease of the Lantus.   Recent symptoms of high blood sugar? none  Eye exam: due  Foot exam: up to date  Microalbumin is not < 30 mg/g. Pt is taking an ACEi/ARB. Due for recheck.  Aspirin: Taking 81mg daily and denies side effects    Current labs include:    BP Readings from Last 3 Encounters:   05/10/17 130/68   03/10/17 95/69   02/23/17 111/72     Lab Results   Component Value Date    A1C 6.3 12/13/2016    A1C 7.2 10/11/2016    A1C 9.1 08/29/2016    A1C 7.2 06/30/2016    A1C 6.8 12/09/2015     Recent Labs   Lab Test  08/29/16   1652  12/09/15   0946   12/20/13   1022   10/19/11   1111   CHOL   --    --    --   219*   --   169   HDL   --    --    --   43*   --   49*   LDL  85  72   < >   109   < >  85   TRIG   --    --    --   331*   --   175*   CHOLHDLRATIO   --    --    --   5.0   --   3.0    < > = values in this interval not displayed.       Lab Results   Component Value Date    MICROL 89926 06/30/2016     No results found for: MICROALBUMIN    Last Basic Metabolic Panel:  Lab Results   Component Value Date     02/13/2017      Lab Results   Component Value Date    POTASSIUM 5.8 02/13/2017     Lab Results   Component Value Date    CHLORIDE 115 02/13/2017     Lab Results   Component Value Date    FRANCES 8.3 02/13/2017     Lab Results   Component Value Date    CO2 21 02/13/2017     Lab Results   Component Value Date    BUN 27 02/13/2017     Lab Results   Component Value Date    CR 2.62 02/13/2017     Lab Results   Component Value Date     02/13/2017     GFR Estimate   Date Value Ref Range Status   02/13/2017 18 (L) >60 mL/min/1.7m2 Final     Comment:     Non  GFR Calc   01/27/2017 19 (L) >60 mL/min/1.7m2 Final     Comment:     Non  GFR Calc   01/11/2017 22 (L) >60 mL/min/1.7m2 Final     Comment:     Non  GFR Calc     GFR Estimate If Black   Date Value Ref Range Status   02/13/2017 22 (L) >60 mL/min/1.7m2 Final     Comment:      GFR Calc   01/27/2017 23 (L) >60 mL/min/1.7m2 Final     Comment:      GFR Calc   01/11/2017 27 (L) >60 mL/min/1.7m2 Final     Comment:      GFR Calc     TSH   Date Value Ref Range Status   11/03/2016 1.28 0.40 - 4.00 mU/L Final     T4 Free   Date Value Ref Range Status   10/11/2016 0.87 0.76 - 1.46 ng/dL Final     Wt Readings from Last 2 Encounters:   05/10/17 190 lb 8 oz (86.4 kg)   03/10/17 196 lb (88.9 kg)     Most Recent Immunizations   Administered Date(s) Administered     Hepatitis B 06/28/2012     Influenza (High Dose) 3 valent vaccine 10/11/2016     Influenza (IIV3) 11/04/2014     Pneumococcal (PCV 13) 01/05/2015     Pneumococcal 23 valent 04/01/2011     TD  (ADULT, 7+) 07/12/2007     TDAP Vaccine (Adacel) 05/18/2011     Twinrix A/B 03/21/2012     Zoster vaccine, live 04/30/2012     ASSESSMENT:                                                    Current medications were reviewed with her today.      Medication Adherence: Improving per pt report. Refilled medications that were out of refills.     Hypertension/CAD: BP at goal of BP <140/90 today. Continue current therapy, continued to encourage pt to meet with cardiology. Due for CHC and renal panel today.     Diabetes: Stable. Patient is meeting A1c goal of < 7%. Hypoglycemic issues have improved. Due for A1c and microalbumin today.     PLAN:                                                      1. Refills on medications sent.  2. Labs today: A1c, microalbumin, CBC, renal panel    I spent 30 minutes with this patient today.  All changes were made via collaborative practice agreement with Mai Babcock. A copy of the visit note was provided to the patient's primary care provider.     Will follow up in 1 month.     The patient was given a summary of these recommendations as an after visit summary.    Pratibha Barth, PharmD  Medication Therapy Management Pharmacist  Pager: [phone]

## 2017-05-10 NOTE — LETTER
[facility]  [facility]  [address] [place] [address], [address]  [place]   [address]  Telephone:  [phone]              May 15, 2017      Kim Anne  [address]  [facility] [address]              Dear Kim,    Your kidney tests show stress.     Your A1C and LDL were great.    Potassium was elevated. AVOID POTASSIUM SUPPLEMENTS AND FOODS and Recheck.  Foods high in Potassium: Avocados, spinach, sweet potatos, coconut water, yogurt, white beans, bananas, acorn squash, dried apricost, mushrooms.  AVOID THESE FOODS FOR NOW.    Results for orders placed or performed in visit on 05/10/17   CBC with platelets   Result Value Ref Range    WBC 7.2 4.0 - 11.0 10e9/L    RBC Count 4.46 3.8 - 5.2 10e12/L    Hemoglobin 12.8 11.7 - 15.7 g/dL    Hematocrit 41.4 35.0 - 47.0 %    MCV 93 78 - 100 fl    MCH 28.7 26.5 - 33.0 pg    MCHC 30.9 (L) 31.5 - 36.5 g/dL    RDW 15.6 (H) 10.0 - 15.0 %    Platelet Count 322 150 - 450 10e9/L   Renal panel   Result Value Ref Range    Sodium 145 (H) 133 - 144 mmol/L    Potassium 5.5 (H) 3.4 - 5.3 mmol/L    Chloride 118 (H) 94 - 109 mmol/L    Carbon Dioxide 19 (L) 20 - 32 mmol/L    Anion Gap 8 3 - 14 mmol/L    Glucose 142 (H) 70 - 99 mg/dL    Urea Nitrogen 23 7 - 30 mg/dL    Creatinine 2.25 (H) 0.52 - 1.04 mg/dL    GFR Estimate 21 (L) >60 mL/min/1.7m2    GFR Estimate If Black 26 (L) >60 mL/min/1.7m2    Calcium 8.0 (L) 8.5 - 10.1 mg/dL    Phosphorus 3.8 2.5 - 4.5 mg/dL    Albumin 1.8 (L) 3.4 - 5.0 g/dL   Protein  random urine   Result Value Ref Range    Protein Random Urine 20.68 g/L    Protein Total Urine g/gr Creatinine 14.07 (H) 0 - 0.2 g/g Cr   Hemoglobin A1c   Result Value Ref Range    Hemoglobin A1C 6.6 (H) 4.3 - 6.0 %   Lipid Profile   Result Value Ref Range    Cholesterol 223 (H) <200 mg/dL    Triglycerides 303 (H) <150 mg/dL    HDL Cholesterol 66 >49 mg/dL    LDL Cholesterol Calculated 96 <100 mg/dL    Non HDL Cholesterol  157 (H) <130 mg/dL   Urine Drugs of Abuse Screen Panel 13   Result Value Ref Range    Cannabinoids (80-lab-7-carboxy-9-THC)  NDET ng/mL     Not Detected   Cutoff for a negative cannabinoid is 50 ng/mL or less.      Phencyclidine (Phencyclidine)  NDET ng/mL     Not Detected   Cutoff for a negative PCP is 25 ng/mL or less.      Cocaine (Benzoylecgonine)  NDET ng/mL     Not Detected   Cutoff for a negative cocaine is 150 ng/ml or less.      Methamphetamine (d-Methamphetamine)  NDET ng/mL     Not Detected   Cutoff for a negative methamphetamine is 500 ng/ml or less.      Opiates (Morphine)  NDET ng/mL     Not Detected   Cutoff for a negative opiate is 100 ng/ml or less.      Amphetamine (d-Amphetamine)  NDET ng/mL     Not Detected   Cutoff for a negative amphetamine is 500 ng/mL or less.      Benzodiazepines (Nordiazepam)  NDET ng/mL     Not Detected   Cutoff for a negative benzodiazepine is 150 ng/ml or less.      Tricyclic Antidepressants (Desipramine)  NDET ng/mL     Not Detected   Cutoff for a negative tricyclic antidepressant is 300 ng/ml or less.      Methadone (Methadone)  NDET ng/mL     Not Detected   Cutoff for a negative methadone is 200 ng/ml or less.      Barbiturates (Butalbital)  NDET ng/mL     Not Detected   Cutoff for a negative barbituate is 200 ng/ml or less.      Oxycodone (Oxycodone) (A) NDET ng/mL     Detected, Abnormal Result   Cutoff for a positive oxycodone is greater than 100 ng/ml.   This is an unconfirmed screening result to be used for medical purposes only.   Order [id number] for confirmation or individual confirmation tests to [facility].      Propoxyphene (Norpropoxyphene)  NDET ng/mL     Not Detected   Cutoff for a negative propoxyphene is 300 ng/ml or less      Buprenorphine (Buprenorphine)  NDET ng/mL     Not Detected   Cutoff for a negative buprenorphine is 10 ng/ml or less     Creatinine urine calculation only   Result Value Ref Range    Creatinine Urine 147 mg/dL                Sincerely,      Mai Babcock MD, MEd

## 2017-05-10 NOTE — MR AVS SNAPSHOT
After Visit Summary   5/10/2017    Kim Anne    MRN: [id number]           Patient Information     Date Of Birth          1945        Visit Information        Provider Department      5/10/2017 10:00 AM Pratibha Barth [facility]        Today's Diagnoses     Hypertension associated with diabetes (H)    -  1    Chronic obstructive pulmonary disease, unspecified COPD type (H)        Hyperlipidemia LDL goal <100        Type 2 diabetes mellitus without complication, with long-term current use of insulin (H)        Iron deficiency anemia, unspecified        Other specified hypothyroidism        Groin rash        Slow transit constipation        Coronary artery disease involving native coronary artery of native heart without angina pectoris           Follow-ups after your visit        Your next 10 appointments already scheduled     May 22, 2017 10:15 AM CDT   RETURN GENERAL with Cyn Gabriel MD   Eye Clinic ([facility])    Manuelito Ruiz [facility]  [address]  [address] [address]  [facility] [address] [phone]              Who to contact     If you have questions or need follow up information about today's clinic visit or your schedule please contact [facility] [place] directly at [phone].  Normal or non-critical lab and imaging results will be communicated to you by MyChart, letter or phone within 4 business days after the clinic has received the results. If you do not hear from us within 7 days, please contact the clinic through MyChart or phone. If you have a critical or abnormal lab result, we will notify you by phone as soon as possible.  Submit refill requests through [employer] or call your pharmacy and they will forward the refill request to us. Please allow 3 business days for your refill to be completed.          Additional Information About Your Visit        MyChart Information     [employer]  "lets you send messages to your doctor, view your test results, renew your prescriptions, schedule appointments and more. To sign up, go to [email or web address] . Click on \"Log in\" on the left side of the screen, which will take you to the Welcome page. Then click on \"Sign up Now\" on the right side of the page.     You will be asked to enter the access code listed below, as well as some personal information. Please follow the directions to create your username and password.     Your access code is: [email or web address]  Expires: 2017 11:47 AM     Your access code will  in 90 days. If you need help or a new code, please call your [place] clinic or [phone].        Care EveryWhere ID     This is your Care EveryWhere ID. This could be used by other organizations to access your [place] medical records  [id number]         Blood Pressure from Last 3 Encounters:   05/10/17 130/68   03/10/17 95/69   17 111/72    Weight from Last 3 Encounters:   05/10/17 190 lb 8 oz (86.4 kg)   03/10/17 196 lb (88.9 kg)   17 196 lb (88.9 kg)              Today, you had the following     No orders found for display         Today's Medication Changes          These changes are accurate as of: 5/10/17 11:59 PM.  If you have any questions, ask your nurse or doctor.               Start taking these medicines.        Dose/Directions    varenicline 0.5 MG X 11 & 1 MG X 42 tablet   Commonly known as:  CHANTIX STARTING MONTH GRETEL   Used for:  Encounter for smoking cessation counseling   Started by:  Mai Babcock MD        Take 0.5 mg tab daily for 3 days, then 0.5 mg tab twice daily for 4 days, then 1 mg twice daily.   Quantity:  53 tablet   Refills:  0         These medicines have changed or have updated prescriptions.        Dose/Directions    blood glucose monitoring test strip   Commonly known as:  FREESTYLE LITE   This may have changed:  See the new instructions.   Used for:  Type 2 diabetes mellitus without " complication, with long-term current use of insulin (H)   Changed by:  Pratibha Barth        TEST BLOOD SUGAR TWO TIMES A DAY   Quantity:  50 strip   Refills:  12       * order for DME   This may have changed:  Another medication with the same name was added. Make sure you understand how and when to take each.   Used for:  Edema, unspecified type   Changed by:  Mai Babcock MD        Equipment being ordered: two pairs moderate knee high support hose   Quantity:  2 Device   Refills:  1       * order for DME   This may have changed:  You were already taking a medication with the same name, and this prescription was added. Make sure you understand how and when to take each.   Used for:  Risk for falls   Changed by:  Mai Babcock MD        One wheeled walker with seat and brakes and basket   Quantity:  1 Device   Refills:  0       SM ALCOHOL PREP 70 % Pads   This may have changed:    - how much to take  - how to take this  - when to take this   Used for:  Type 2 diabetes mellitus without complication, with long-term current use of insulin (H)   Changed by:  Pratibha Barth        Dose:  1 pad   Externally apply 1 pad topically 4 times daily   Quantity:  100 each   Refills:  11       * Notice:  This list has 2 medication(s) that are the same as other medications prescribed for you. Read the directions carefully, and ask your doctor or other care provider to review them with you.         Where to get your medicines      These medications were sent to [facility] [place] [address] [address]  [address] [facility] [address]     Phone:  [phone]     albuterol 108 (90 BASE) MCG/ACT Inhaler    atorvastatin 40 MG tablet    blood glucose monitoring lancets    blood glucose monitoring test strip    budesonide-formoterol 80-4.5 MCG/ACT Inhaler    ferrous sulfate 325 (65 FE) MG tablet    levothyroxine 200 MCG tablet    metoprolol 25 MG 24 hr tablet     nystatin 028034 UNIT/GM Powd    polyethylene glycol powder    SM ALCOHOL PREP 70 % Pads    varenicline 0.5 MG X 11 & 1 MG X 42 tablet         Some of these will need a paper prescription and others can be bought over the counter.  Ask your nurse if you have questions.     Bring a paper prescription for each of these medications     order for DME    oxyCODONE 10 MG IR tablet                Primary Care Provider Office Phone # Fax #    Mai Babcock [phone]       [facility] CARE [facility] [address] [place] [address]  [facility] [address]        Thank you!     Thank you for choosing [facility]IMARY [facility]  for your care. Our goal is always to provide you with excellent care. Hearing back from our patients is one way we can continue to improve our services. Please take a few minutes to complete the written survey that you may receive in the mail after your visit with us. Thank you!             Your Updated Medication List - Protect others around you: Learn how to safely use, store and throw away your medicines at www.disposemymeds.org.          This list is accurate as of: 5/10/17 11:59 PM.  Always use your most recent med list.                   Brand Name Dispense Instructions for use    albuterol 108 (90 BASE) MCG/ACT Inhaler    PROAIR HFA/PROVENTIL HFA/VENTOLIN HFA    18 g    Inhale 2 puffs into the lungs every 6 hours as needed for shortness of breath / dyspnea or wheezing       ASPIRIN LOW DOSE 81 MG EC tablet   Generic drug:  aspirin     30 tablet    Take 1 tablet (81 mg) by mouth daily       atorvastatin 40 MG tablet    LIPITOR    90 tablet    Take 1 tablet (40 mg) by mouth daily       blood glucose monitoring lancets     1 Box    Test BS four times daily as directed       blood glucose monitoring test strip    FREESTYLE LITE    50 strip    TEST BLOOD SUGAR TWO TIMES A DAY       budesonide-formoterol 80-4.5 MCG/ACT Inhaler    SYMBICORT    1 Inhaler    Inhale 2 puffs  into the lungs 2 times daily       docusate sodium 100 MG capsule    COLACE    60 capsule    Take 1 capsule (100 mg) by mouth 2 times daily       EUCERIN CALMING DAILY MOIST Crea     1 Tube    Externally apply 1 dose * topically daily       ferrous sulfate 325 (65 FE) MG tablet    IRON    100 tablet    Take 1 tablet (325 mg) by mouth daily (with breakfast)       furosemide 20 MG tablet    LASIX    60 tablet    Take 1 tablet (20 mg) by mouth daily       glucose-vitamin C 4-0.006 G Chew    DEX4 GLUCOSE    50 tablet    Take 1 tablet (4 g) by mouth every hour as needed for low blood sugar       insulin glargine 100 UNIT/ML injection    LANTUS    15 mL    Inject 30 Units Subcutaneous every morning       insulin pen needle 31G X 6 MM     120 each    Use as directed       levothyroxine 200 MCG tablet    SYNTHROID/LEVOTHROID    90 tablet    Take 1 tablet (200 mcg) by mouth See Admin Instructions New daily increase. Recheck labs in 8 weeks.       losartan 100 MG tablet    COZAAR    90 tablet    Take 1 tablet (100 mg) by mouth daily       metoprolol 25 MG 24 hr tablet    TOPROL-XL    90 tablet    Take 1 tablet (25 mg) by mouth daily       nitroglycerin 0.4 MG sublingual tablet    NITROSTAT    25 tablet    Place 1 tablet (0.4 mg) under the tongue every 5 minutes as needed for chest pain       nystatin 818368 UNIT/GM Powd    MYCOSTATIN    30 g    Apply 1 g topically 3 times daily as needed       * order for DME     2 Device    Equipment being ordered: two pairs moderate knee high support hose       * order for DME     1 Device    One wheeled walker with seat and brakes and basket       oxyCODONE 10 MG IR tablet    ROXICODONE    90 tablet    Take 1 tablet (10 mg) by mouth every 8 hours as needed       polyethylene glycol powder    MIRALAX    510 g    Take 17 g (1 capful) by mouth daily       SM ALCOHOL PREP 70 % Pads     100 each    Externally apply 1 pad topically 4 times daily       varenicline 0.5 MG X 11 & 1 MG X 42 tablet     CHANTIX STARTING MONTH GRETEL    53 tablet    Take 0.5 mg tab daily for 3 days, then 0.5 mg tab twice daily for 4 days, then 1 mg twice daily.       vitamin D 2000 UNITS tablet     60 tablet    Take 2,000 Units by mouth daily       * Notice:  This list has 2 medication(s) that are the same as other medications prescribed for you. Read the directions carefully, and ask your doctor or other care provider to review them with you.

## 2017-05-10 NOTE — MR AVS SNAPSHOT
"              After Visit Summary   5/10/2017    Kim Anne    MRN: [id number]           Patient Information     Date Of Birth          1945        Visit Information        Provider Department      5/10/2017 9:30 AM Mai Babcock MD [facility]        Today's Diagnoses     Hyperlipidemia LDL goal <100    -  1    CKD (chronic kidney disease) stage 4, GFR 15-29 ml/min (H)        Chronic obstructive pulmonary disease, unspecified COPD type (H)        Edema, unspecified type        History of MI (myocardial infarction)        Peripheral vascular disease (H)        Chronic low back pain, unspecified back pain laterality, with sciatica presence unspecified        Risk for falls        Type 2 diabetes mellitus with stage 4 chronic kidney disease, with long-term current use of insulin (H)        Encounter for smoking cessation counseling        Chronic low back pain without sciatica, unspecified back pain laterality          Care Instructions    Pratibha did your refills    Chantix starter pack for smoking cessation.   You will try to have your coffee without cigarette.  Smoking Cessation Ideas    1. Consider hiding your cigarettes in an inconvenient place like the freezer, the trunk of your car etc.    2. Consider using gum, candy, toothpicks instead. Many smokers are oral people who like things in their mouth and hands.    3. Consider using an elastic/rubber band and snapping it when you want a cigarette to remind you to pause and consider if you want to make the choice to smoke. This is also a negative feedback tool.    4. Consider cutting the cigarette in half.    5. Consider portioning out your allotment for the day in a ziplock bag so that you can see what your limit is for the day. Then put the pack somewhere inconvenient.    6. Set a quit date.    7. Consider when and where and who you smoke with. Consider changing those behaviors. For example \"everytime I have coffee, " "I smoke\" try and separate these events from each other.    8. Consider setting longer time limits before your first or next cigarette. You could use a timer if needed.      Re-establish care with Mario/ ABDIRIZAK 1 month.    Pain medication refill today        Follow-ups after your visit        Additional Services     OPHTHALMOLOGY ADULT REFERRAL       Your provider has referred you to:  [facility]: Eye Clinic [facility] [phone]   [email or web address]      Please be aware that coverage of these services is subject to the terms and limitations of your health insurance plan.  Call member services at your health plan with any benefit or coverage questions.      Please bring the following to your appointment:  >>   Any x-rays, CTs or MRIs which have been performed.  Contact the facility where they were done to arrange for  prior to your scheduled appointment.  Any new CT, MRI or other procedures ordered by your specialist must be performed at a [place] facility or coordinated by your clinic's referral office.    >>   List of current medications   >>   This referral request   >>   Any documents/labs given to you for this referral                  Follow-up notes from your care team     Return in about 4 weeks (around 6/7/2017).      Who to contact     If you have questions or need follow up information about today's clinic visit or your schedule please contact [facility] PRIMARY [facility]ARE directly at [phone].  Normal or non-critical lab and imaging results will be communicated to you by MyChart, letter or phone within 4 business days after the clinic has received the results. If you do not hear from us within 7 days, please contact the clinic through MyChart or phone. If you have a critical or abnormal lab result, we will notify you by phone as soon as possible.  Submit refill requests through [employer] or call your pharmacy and they will forward the refill request to " "us. Please allow 3 business days for your refill to be completed.          Additional Information About Your Visit        MyChart Information     [employer] lets you send messages to your doctor, view your test results, renew your prescriptions, schedule appointments and more. To sign up, go to [email or web address] . Click on \"Log in\" on the left side of the screen, which will take you to the Welcome page. Then click on \"Sign up Now\" on the right side of the page.     You will be asked to enter the access code listed below, as well as some personal information. Please follow the directions to create your username and password.     Your access code is: [email or web address]  Expires: 2017 11:47 AM     Your access code will  in 90 days. If you need help or a new code, please call your [place] clinic or [phone].        Care EveryWhere ID     This is your Care EveryWhere ID. This could be used by other organizations to access your [place] medical records  [id number]        Your Vitals Were     Pulse Temperature Respirations Height Pulse Oximetry BMI (Body Mass Index)    60 98.5  F (36.9  C) (Oral) 16 5' 5\" (1.651 m) 97% 31.7 kg/m2       Blood Pressure from Last 3 Encounters:   05/10/17 130/68   03/10/17 95/69   17 111/72    Weight from Last 3 Encounters:   05/10/17 190 lb 8 oz (86.4 kg)   03/10/17 196 lb (88.9 kg)   17 196 lb (88.9 kg)              We Performed the Following     CBC with platelets     Hemoglobin A1c     Lipid Profile     OPHTHALMOLOGY ADULT REFERRAL     Protein  random urine     Renal panel     Urine Drugs of Abuse Screen Panel 13          Today's Medication Changes          These changes are accurate as of: 5/10/17 10:17 AM.  If you have any questions, ask your nurse or doctor.               Start taking these medicines.        Dose/Directions    varenicline 0.5 MG X 11 & 1 MG X 42 tablet   Commonly known as:  CHANTIX STARTING MONTH GRETEL   Used for:  Encounter for smoking " cessation counseling   Started by:  Mai Babcock MD        Take 0.5 mg tab daily for 3 days, then 0.5 mg tab twice daily for 4 days, then 1 mg twice daily.   Quantity:  53 tablet   Refills:  0         These medicines have changed or have updated prescriptions.        Dose/Directions    blood glucose monitoring test strip   Commonly known as:  FREESTYLE LITE   This may have changed:  See the new instructions.   Used for:  Type 2 diabetes mellitus without complication, with long-term current use of insulin (H)   Changed by:  Pratibha Barth        TEST BLOOD SUGAR TWO TIMES A DAY   Quantity:  50 strip   Refills:  12       * order for DME   This may have changed:  Another medication with the same name was added. Make sure you understand how and when to take each.   Used for:  Edema, unspecified type   Changed by:  Mai Babcock MD        Equipment being ordered: two pairs moderate knee high support hose   Quantity:  2 Device   Refills:  1       * order for DME   This may have changed:  You were already taking a medication with the same name, and this prescription was added. Make sure you understand how and when to take each.   Used for:  Risk for falls   Changed by:  Mai Babcock MD        One wheeled walker with seat and brakes and basket   Quantity:  1 Device   Refills:  0       SM ALCOHOL PREP 70 % Pads   This may have changed:    - how much to take  - how to take this  - when to take this   Used for:  Type 2 diabetes mellitus without complication, with long-term current use of insulin (H)   Changed by:  Pratibha Barth        Dose:  1 pad   Externally apply 1 pad topically 4 times daily   Quantity:  100 each   Refills:  11       * Notice:  This list has 2 medication(s) that are the same as other medications prescribed for you. Read the directions carefully, and ask your doctor or other care provider to review them with you.         Where to get your medicines      These  medications were sent to [facility] [place], [place] - [address]  [address] [facility] [address], [facility] [address]     Phone:  [phone]     albuterol 108 (90 BASE) MCG/ACT Inhaler    atorvastatin 40 MG tablet    blood glucose monitoring lancets    blood glucose monitoring test strip    budesonide-formoterol 80-4.5 MCG/ACT Inhaler    ferrous sulfate 325 (65 FE) MG tablet    levothyroxine 200 MCG tablet    metoprolol 25 MG 24 hr tablet    nystatin 287042 UNIT/GM Powd    polyethylene glycol powder    SM ALCOHOL PREP 70 % Pads    varenicline 0.5 MG X 11 & 1 MG X 42 tablet         Some of these will need a paper prescription and others can be bought over the counter.  Ask your nurse if you have questions.     Bring a paper prescription for each of these medications     order for DME    oxyCODONE 10 MG IR tablet                Primary Care Provider Office Phone # Fax #    Mai Babcock [phone]       [facility] [address] [address]  [facility] [address]        Thank you!     Thank you for choosing [facility] PRIMARY CARE  for your care. Our goal is always to provide you with excellent care. Hearing back from our patients is one way we can continue to improve our services. Please take a few minutes to complete the written survey that you may receive in the mail after your visit with us. Thank you!             Your Updated Medication List - Protect others around you: Learn how to safely use, store and throw away your medicines at www.disposemymeds.org.          This list is accurate as of: 5/10/17 10:17 AM.  Always use your most recent med list.                   Brand Name Dispense Instructions for use    albuterol 108 (90 BASE) MCG/ACT Inhaler    PROAIR HFA/PROVENTIL HFA/VENTOLIN HFA    18 g    Inhale 2 puffs into the lungs every 6 hours as needed for shortness of breath / dyspnea or wheezing       ASPIRIN LOW DOSE 81 MG EC tablet    Generic drug:  aspirin     30 tablet    Take 1 tablet (81 mg) by mouth daily       atorvastatin 40 MG tablet    LIPITOR    90 tablet    Take 1 tablet (40 mg) by mouth daily       blood glucose monitoring lancets     1 Box    Test BS four times daily as directed       blood glucose monitoring test strip    FREESTYLE LITE    50 strip    TEST BLOOD SUGAR TWO TIMES A DAY       budesonide-formoterol 80-4.5 MCG/ACT Inhaler    SYMBICORT    1 Inhaler    Inhale 2 puffs into the lungs 2 times daily       docusate sodium 100 MG capsule    COLACE    60 capsule    Take 1 capsule (100 mg) by mouth 2 times daily       EUCERIN CALMING DAILY MOIST Crea     1 Tube    Externally apply 1 dose * topically daily       ferrous sulfate 325 (65 FE) MG tablet    IRON    100 tablet    Take 1 tablet (325 mg) by mouth daily (with breakfast)       furosemide 20 MG tablet    LASIX    60 tablet    Take 1 tablet (20 mg) by mouth daily       glucose-vitamin C 4-0.006 G Chew    DEX4 GLUCOSE    50 tablet    Take 1 tablet (4 g) by mouth every hour as needed for low blood sugar       insulin glargine 100 UNIT/ML injection    LANTUS    15 mL    Inject 30 Units Subcutaneous every morning       insulin pen needle 31G X 6 MM     120 each    Use as directed       levothyroxine 200 MCG tablet    SYNTHROID/LEVOTHROID    90 tablet    Take 1 tablet (200 mcg) by mouth See Admin Instructions New daily increase. Recheck labs in 8 weeks.       losartan 100 MG tablet    COZAAR    90 tablet    Take 1 tablet (100 mg) by mouth daily       metoprolol 25 MG 24 hr tablet    TOPROL-XL    90 tablet    Take 1 tablet (25 mg) by mouth daily       nitroglycerin 0.4 MG sublingual tablet    NITROSTAT    25 tablet    Place 1 tablet (0.4 mg) under the tongue every 5 minutes as needed for chest pain       nystatin [id number] UNIT/GM Powd    MYCOSTATIN    30 g    Apply 1 g topically 3 times daily as needed       * order for DME     2 Device    Equipment being ordered: two pairs moderate  knee high support hose       * order for DME     1 Device    One wheeled walker with seat and brakes and basket       oxyCODONE 10 MG IR tablet    ROXICODONE    90 tablet    Take 1 tablet (10 mg) by mouth every 8 hours as needed       polyethylene glycol powder    MIRALAX    510 g    Take 17 g (1 capful) by mouth daily       SM ALCOHOL PREP 70 % Pads     100 each    Externally apply 1 pad topically 4 times daily       varenicline 0.5 MG X 11 & 1 MG X 42 tablet    CHANTIX STARTING MONTH GRETEL    53 tablet    Take 0.5 mg tab daily for 3 days, then 0.5 mg tab twice daily for 4 days, then 1 mg twice daily.       vitamin D 2000 UNITS tablet     60 tablet    Take 2,000 Units by mouth daily       * Notice:  This list has 2 medication(s) that are the same as other medications prescribed for you. Read the directions carefully, and ask your doctor or other care provider to review them with you.

## 2017-05-10 NOTE — PROGRESS NOTES
Call placed to member to offer to schedule home visit to assess for services.  Left voicemail requesting call back.   Batool Mai RN, BSN, PHN  [facility]  [phone]  Fax: [phone]

## 2017-05-18 ENCOUNTER — CARE COORDINATION (OUTPATIENT)
Dept: GERIATRIC MEDICINE | Facility: CLINIC | Age: 72
End: 2017-05-18

## 2017-05-18 NOTE — PROGRESS NOTES
Per Roxanne, arranged transportation thru [facility] for the below appt:  Appt Date: 5/22 @ 10:15 am  Clinic Name:   [facility] Clinic - [facility], [address] [place] [address], [address], [place] [address]  Transportation Provider: [employer]    time:  9:15 am    Notified Sarah of  time.    Brianna Barrios  Case Management Specialist  [facility]   [phone]

## 2017-05-22 ENCOUNTER — OFFICE VISIT (OUTPATIENT)
Dept: OPHTHALMOLOGY | Facility: CLINIC | Age: 72
End: 2017-05-22
Attending: OPHTHALMOLOGY
Payer: MEDICARE

## 2017-05-22 DIAGNOSIS — E11.9 DIABETES MELLITUS TYPE 2 WITHOUT RETINOPATHY (H): Primary | ICD-10-CM

## 2017-05-22 DIAGNOSIS — H52.4 PRESBYOPIA: ICD-10-CM

## 2017-05-22 DIAGNOSIS — Z96.1 PSEUDOPHAKIA, BOTH EYES: ICD-10-CM

## 2017-05-22 DIAGNOSIS — H52.13 MYOPIC ASTIGMATISM OF BOTH EYES: ICD-10-CM

## 2017-05-22 DIAGNOSIS — H52.203 MYOPIC ASTIGMATISM OF BOTH EYES: ICD-10-CM

## 2017-05-22 PROCEDURE — 99213 OFFICE O/P EST LOW 20 MIN: CPT | Mod: ZF

## 2017-05-22 ASSESSMENT — VISUAL ACUITY
OS_CC: 20/20
METHOD: SNELLEN - LINEAR
OD_CC: 20/20
CORRECTION_TYPE: GLASSES
OD_CC+: -2

## 2017-05-22 ASSESSMENT — CUP TO DISC RATIO
OS_RATIO: 0.2
OD_RATIO: 0.2

## 2017-05-22 ASSESSMENT — EXTERNAL EXAM - LEFT EYE: OS_EXAM: NORMAL

## 2017-05-22 ASSESSMENT — CONF VISUAL FIELD
OS_NORMAL: 1
METHOD: COUNTING FINGERS
OD_NORMAL: 1

## 2017-05-22 ASSESSMENT — SLIT LAMP EXAM - LIDS
COMMENTS: NORMAL
COMMENTS: NORMAL

## 2017-05-22 ASSESSMENT — REFRACTION_WEARINGRX
OD_ADD: +2.50
SPECS_TYPE: BIF
OS_AXIS: 105
OS_ADD: +2.50
OD_CYLINDER: +0.50
OS_SPHERE: -0.75
OS_CYLINDER: +0.75
OD_SPHERE: -1.00
OD_AXIS: 070

## 2017-05-22 ASSESSMENT — TONOMETRY
OS_IOP_MMHG: 18
IOP_METHOD: TONOPEN
OD_IOP_MMHG: 17

## 2017-05-22 ASSESSMENT — EXTERNAL EXAM - RIGHT EYE: OD_EXAM: NORMAL

## 2017-05-22 NOTE — NURSING NOTE
Chief Complaints and History of Present Illnesses   Patient presents with     Eye Exam For Diabetes     Diabetes mellitus type 2 without retinopathy      HPI    Affected eye(s):  Both   Symptoms:        Frequency:  Constant       Do you have eye pain now?:  No      Comments:  Had IOL done 9/16 and states that she has had floaters since.  Eye will ache  BS does not check  Lab Results       Component                Value               Date                       A1C                      6.6                 05/10/2017                 A1C                      6.3                 12/13/2016                 A1C                      7.2                 10/11/2016                 A1C                      9.1                 08/29/2016                 A1C                      7.2                 06/30/2016            Wendy Allred COT 10:42 AM May 22, 2017

## 2017-05-22 NOTE — MR AVS SNAPSHOT
After Visit Summary   2017    Kim Anne    MRN: [id number]           Patient Information     Date Of Birth          1945        Visit Information        Provider Department      2017 10:15 AM Cyn Gabriel MD [facility] Clinic        Today's Diagnoses     Diabetes mellitus type 2 without retinopathy (H)    -  1    Pseudophakia, both eyes        Myopic astigmatism of both eyes        Presbyopia           Follow-ups after your visit        Follow-up notes from your care team     Return in about 1 year (around 2018).      Who to contact     Please call your clinic at [phone] to:    Ask questions about your health    Make or cancel appointments    Discuss your medicines    Learn about your test results    Speak to your doctor   If you have compliments or concerns about an experience at your clinic, or if you wish to file a complaint, please contact [facility] Patient Relations at [phone] or email us at [email or web address]         Additional Information About Your Visit        MyChart Information     [employer] is an electronic gateway that provides easy, online access to your medical records. With [employer], you can request a clinic appointment, read your test results, renew a prescription or communicate with your care team.     To sign up for [employer] visit the website at [email or web address]   You will be asked to enter the access code listed below, as well as some personal information. Please follow the directions to create your username and password.     Your access code is: [email or web address]  Expires: 2017 12:21 PM     Your access code will  in 90 days. If you need help or a new code, please contact your [facility] Clinic or call [phone] for assistance.        Care EveryWhere ID     This is your Care EveryWhere ID. This could be used by other organizations to access your [facility]  records  [id number]         Blood Pressure from Last 3 Encounters:   05/10/17 130/68   03/10/17 95/69   02/23/17 111/72    Weight from Last 3 Encounters:   05/10/17 86.4 kg (190 lb 8 oz)   03/10/17 88.9 kg (196 lb)   02/23/17 88.9 kg (196 lb)              Today, you had the following     No orders found for display       Primary Care Provider Office Phone # Fax #    Mai Babcock [phone]        [facility] [address] [place] [address] [address]  [facility] [address]        Thank you!     Thank you for choosing EYE CLINIC  for your care. Our goal is always to provide you with excellent care. Hearing back from our patients is one way we can continue to improve our services. Please take a few minutes to complete the written survey that you may receive in the mail after your visit with us. Thank you!             Your Updated Medication List - Protect others around you: Learn how to safely use, store and throw away your medicines at www.disposemymeds.org.          This list is accurate as of: 5/22/17 12:21 PM.  Always use your most recent med list.                   Brand Name Dispense Instructions for use    albuterol 108 (90 BASE) MCG/ACT Inhaler    PROAIR HFA/PROVENTIL HFA/VENTOLIN HFA    18 g    Inhale 2 puffs into the lungs every 6 hours as needed for shortness of breath / dyspnea or wheezing       ASPIRIN LOW DOSE 81 MG EC tablet   Generic drug:  aspirin     30 tablet    Take 1 tablet (81 mg) by mouth daily       atorvastatin 40 MG tablet    LIPITOR    90 tablet    Take 1 tablet (40 mg) by mouth daily       blood glucose monitoring lancets     1 Box    Test BS four times daily as directed       blood glucose monitoring test strip    FREESTYLE LITE    50 strip    TEST BLOOD SUGAR TWO TIMES A DAY       budesonide-formoterol 80-4.5 MCG/ACT Inhaler    SYMBICORT    1 Inhaler    Inhale 2 puffs into the lungs 2 times daily       docusate sodium 100 MG capsule    COLACE    60 capsule    Take 1  capsule (100 mg) by mouth 2 times daily       EUCERIN CALMING DAILY MOIST Crea     1 Tube    Externally apply 1 dose * topically daily       ferrous sulfate 325 (65 FE) MG tablet    IRON    100 tablet    Take 1 tablet (325 mg) by mouth daily (with breakfast)       furosemide 20 MG tablet    LASIX    60 tablet    Take 1 tablet (20 mg) by mouth daily       glucose-vitamin C 4-0.006 G Chew    DEX4 GLUCOSE    50 tablet    Take 1 tablet (4 g) by mouth every hour as needed for low blood sugar       insulin glargine 100 UNIT/ML injection    LANTUS    15 mL    Inject 30 Units Subcutaneous every morning       insulin pen needle 31G X 6 MM     120 each    Use as directed       levothyroxine 200 MCG tablet    SYNTHROID/LEVOTHROID    90 tablet    Take 1 tablet (200 mcg) by mouth See Admin Instructions New daily increase. Recheck labs in 8 weeks.       losartan 100 MG tablet    COZAAR    90 tablet    Take 1 tablet (100 mg) by mouth daily       metoprolol 25 MG 24 hr tablet    TOPROL-XL    90 tablet    Take 1 tablet (25 mg) by mouth daily       nitroglycerin 0.4 MG sublingual tablet    NITROSTAT    25 tablet    Place 1 tablet (0.4 mg) under the tongue every 5 minutes as needed for chest pain       nystatin [id number] UNIT/GM Powd    MYCOSTATIN    30 g    Apply 1 g topically 3 times daily as needed       * order for DME     2 Device    Equipment being ordered: two pairs moderate knee high support hose       * order for DME     1 Device    One wheeled walker with seat and brakes and basket       oxyCODONE 10 MG IR tablet    ROXICODONE    90 tablet    Take 1 tablet (10 mg) by mouth every 8 hours as needed       polyethylene glycol powder    MIRALAX    510 g    Take 17 g (1 capful) by mouth daily       SM ALCOHOL PREP 70 % Pads     100 each    Externally apply 1 pad topically 4 times daily       varenicline 0.5 MG X 11 & 1 MG X 42 tablet    CHANTIX STARTING MONTH GRETEL    53 tablet    Take 0.5 mg tab daily for 3 days, then 0.5 mg tab twice  daily for 4 days, then 1 mg twice daily.       vitamin D 2000 UNITS tablet     60 tablet    Take 2,000 Units by mouth daily       * Notice:  This list has 2 medication(s) that are the same as other medications prescribed for you. Read the directions carefully, and ask your doctor or other care provider to review them with you.

## 2017-05-22 NOTE — PROGRESS NOTES
HPI  Kim Anne is a 71 year old female here for diabetic eye exam. She had cataract surgery in October of 2016, and she notes some floaters, but otherwise vision is good. No flashes. No eye pain, redness, discharge. She states her diabetes is doing well with good blood sugars.     Assessment & Plan    (E11.9) Diabetes mellitus type 2 without retinopathy (HCC)  (primary encounter diagnosis)  Comment: On insulin. Last A1c 6.6 5/2017. No diabetic retinopathy  Plan: Discussed the importance of tight blood glucose control in the prevention of diabetic retinopathy. Recommend yearly dilated eye exam.    (Z96.1) Pseudophakia, both eyes  Comment: Clear visual axis  Plan: Observe    (H52.203) Myopic astigmatism of both eyes  (H52.4) Presbyopia  Comment: Good vision with current glasses  Plan: Observe     -----------------------------------------------------------------------------------    Patient disposition:   Return in about 1 year (around 5/22/2018). or sooner as needed.    Teaching statement:  Complete documentation of historical and exam elements from today's encounter can be found in the full encounter summary report (not reduplicated in this progress note). I personally obtained the chief complaint(s) and history of present illness.  I confirmed and edited as necessary the review of systems, past medical/surgical history, family history, social history, and examination findings as documented by others; and I examined the patient myself. I personally reviewed the relevant tests, images, and reports as documented above.     I formulated and edited as necessary the assessment and plan and discussed the findings and management plan with the patient and family.      Cyn Gabriel MD  Comprehensive Ophthalmology & Ocular Pathology  Department of Ophthalmology and Visual Neurosciences  [email or web address]  Pager [phone]

## 2017-06-08 ENCOUNTER — OFFICE VISIT (OUTPATIENT)
Dept: FAMILY MEDICINE | Facility: CLINIC | Age: 72
End: 2017-06-08
Payer: MEDICARE

## 2017-06-08 VITALS
OXYGEN SATURATION: 97 % | HEART RATE: 59 BPM | DIASTOLIC BLOOD PRESSURE: 60 MMHG | WEIGHT: 188 LBS | RESPIRATION RATE: 16 BRPM | BODY MASS INDEX: 31.28 KG/M2 | SYSTOLIC BLOOD PRESSURE: 130 MMHG | TEMPERATURE: 98.9 F

## 2017-06-08 DIAGNOSIS — G89.29 CHRONIC LOW BACK PAIN WITHOUT SCIATICA, UNSPECIFIED BACK PAIN LATERALITY: ICD-10-CM

## 2017-06-08 DIAGNOSIS — R60.9 EDEMA, UNSPECIFIED TYPE: ICD-10-CM

## 2017-06-08 DIAGNOSIS — M54.50 CHRONIC LOW BACK PAIN WITHOUT SCIATICA, UNSPECIFIED BACK PAIN LATERALITY: ICD-10-CM

## 2017-06-08 PROCEDURE — 99214 OFFICE O/P EST MOD 30 MIN: CPT | Performed by: PHYSICIAN ASSISTANT

## 2017-06-08 RX ORDER — OXYCODONE HYDROCHLORIDE 10 MG/1
10 TABLET ORAL EVERY 8 HOURS PRN
Qty: 90 TABLET | Refills: 0 | Status: SHIPPED | OUTPATIENT
Start: 2017-06-08 | End: 2017-07-07

## 2017-06-08 ASSESSMENT — PAIN SCALES - GENERAL: PAINLEVEL: MODERATE PAIN (4)

## 2017-06-08 NOTE — PATIENT INSTRUCTIONS
Caring for Your Back Throughout the Day  Take care of your back throughout the day. You will likely have fewer back problems if you do. Try to warm up before you move. Shift positions often. Also do your best to form healthy habits.    Warm up for the day  Do a few slow, catlike stretches before starting your day. This simple warmup can soften your disks, stretch your back muscles, and help prevent injuries.  Shift positions often  At work and at home, change positions often. This helps keep your body from getting stiff. Stand up or lean back while you sit. If you can, get up and move every 1/2 hour.  Form healthy habits  Here are some suggestions:     Keep a healthy weight. When you weigh too much, your back is under excess strain. But losing just a few extra pounds can help a lot.    Try not to overeat. Learn about serving sizes. The size of a serving depends on the food and the food group. Many foods list serving sizes on the labels.    Handle minor aches with cold and heat. Apply cold the first 24 to 48 hours. Use heat after that. Always place a thin cloth between your skin and the source of cold or heat.    Take medicines as directed. This helps keep pain under control. Always read labels, and call your healthcare provider or pharmacist if you have any questions.  Walk each day  A daily walk keeps your back and thigh muscles stretched and strong. This gives your back better support. Be sure to walk with your spine s three curves aligned, by keeping your head, hips, and toes connected by a vertical line.     1675-3418 [employer]. [address] [place] [address], [place] [address]. All rights reserved. This information is not intended as a substitute for professional medical care. Always follow your healthcare professional's instructions.        Back Care Tips    Caring for your back  These are things you can do to prevent a recurrence of acute back pain and to reduce symptoms from chronic back pain:     Maintain a healthy weight. If you are overweight, losing weight will help most types of back pain.    Exercise is an important part of recovery from most types of back pain. The back is supported by the muscles behind and in front of the spine. This means both the back muscles and the abdominal muscles must be strengthened to provide better support for your spine.     Swimming and brisk walking are good overall exercises to improve your fitness level.    Practice safe lifting methods (below).    Practice good posture when sitting, standing and walking. Avoid prolonged sitting. This puts more stress on the lower back than standing or walking.    Wear quality shoes with sufficient arch support. Foot and ankle alignment can affect back symptoms. Women should avoid high heels.    Therapeutic massage can help  relax the back muscles without stretching them.    During the first 24 to 72 hours after an acute injury or flare-up of chronic back pain, apply an ice pack to the painful area for 20 minutes and then remove it for 20 minutes over a period of 60 to 90 minutes or several times a day. As a safety precaution, do not use a heating pad at bedtime. Sleeping on a heating pad can lead to skin burns or tissue damage.    Ice and heat therapies can be alternated.  Medications  Talk to your doctor before using medications, especially if you have other medical problems or are taking other medicines.    You may use acetaminophen or ibuprofen to control pain, unless other pain medicine was prescribed. If you have chronic conditions like diabetes, liver or kidney disease, stomach ulcers or gastrointestinal bleeding, or are taking blood thinners, talk with your doctor before taking any meidcations.    Be careful if you are given prescription pain medicines, narcotics, or medication for muscle spasm. They can cause drowsiness, affect your coordination, reflexes and judgment. Do not drive or operate heavy machinery.  Lumbar stretch   Here is a simple stretching exercise that will help relax muscle spasm and keep your back more limber. If exercise makes your back pain worse, don t do it.    Lie on your back with your knees bent and both feet on the ground.    Slowly raise your left knee to your chest as you flatten your lower back against the floor. Hold for 5 seconds.    Relax and repeat the exercise with your right knee.    Do 10 of these exercises for each leg.  Safe lifting method    Don t bend over at the waist to lift an object off the floor.  Instead, bend your knees and hips in a squat.     Keep your back and head upright    Hold the object close to your body, directly in front of you.    Straighten your legs to lift the object.     Lower the object to the floor in the reverse fashion.    If you must slide something across the floor, push it.  Posture tips  Sitting  Sit in chairs with straight backs or low-back support. rKeep your k nees lower than your hip, with your feet flat on the floor.  When driving, sit up straight. Adjust the seat forward so you are not leaning toward the steering wheel.  A small pillow or rolled towel behind your lower back may help if you are driving long distances.   Standing  When standing for long periods, shift most of your weight to one leg at a time. Alternate legs every few minutes.   Sleeping  The best way to sleep is on your side with your knees bent. Put a low pillow under your head to support your neck in a neutral spine position. Avoid thick pillows that bend your neck to one side. Put a pillow between your legs to further relax your lower back. If you sleep on your back, put pillows under your knees to support your legs in a slightly flexed position. Use a firm mattress. If your mattress sags, replace it, or use a 1/2-inch plywood board under the mattress to add support.  Follow-up care  Follow up with your health care provider or as directed by our staff.  If X-rays, a CT scan or an MRI scan were  taken, they will be reviewed by a radiologist. You will be notified of any new findings that may affect your care.  Call 911  Seek emergency medical care if any of the following occur:    Trouble breathing    Confusion    Very drowsy or trouble breathing    Fainting or loss of consciousness    Rapid or very slow heart rate    Loss of  bwel or bladder control  When to seek medical care  Call your health care provider if any of the following occur:    Pain becomes worse or spreads to your arms or legs    Weakness or numbness in one or both arms or legs    Numbness in the groin area    8854-6057 [employer]. [address] [place] [address]. All rights reserved. This information is not intended as a substitute for professional medical care. Always follow your healthcare professional's instructions.

## 2017-06-08 NOTE — PROGRESS NOTES
SUBJECTIVE:                                                    Kim Anne is a 71 year old female who presents to clinic today for the following health issues:      Back Pain Follow Up/ Oxycodone refill        Description:   Location of pain:  left  Character of pain: Varies back pain radiates to toes and pt reports experiencing numbness  Pain radiation:  To both left and right legs and feet   Since last visit, pain is:  unchanged  New numbness or weakness in legs, not attributed to pain:  no     Intensity: Currently4 /10    History:   Pain interferes with job: Not applicable  Therapies tried without relief: None   Therapies tried with relief: Oxycodone and hot tub outcome effective       Accompanying Signs & Symptoms:  Risk of Fracture:  None  Risk of Cauda Equina:  None  Risk of Infection:  None  Risk of Cancer:  None           Amount of exercise or physical activity:  Stairs every day 15 steps     Problems taking medications regularly: No    Medication side effects: none    Diet: regular (no restrictions)        Problem list and histories reviewed & adjusted, as indicated.  Additional history: Patient notes that her symptoms are controlled on OxyContin.  She is amenable to referral for Pain management.  Notes that her lumbar symptoms don't seem to be progressing.  She hasn't been able to  the HOWIE hose due to cost and is amenable to Care Coordination to see if assistance is available.  Repeat back exercises given.      ROS:  Constitutional, HEENT, cardiovascular, pulmonary, gi and gu systems are negative, except as otherwise noted.    OBJECTIVE:                                                    /60  Pulse 59  Temp 98.9  F (37.2  C) (Oral)  Resp 16  Wt 188 lb (85.3 kg)  SpO2 97%  BMI 31.28 kg/m2  Body mass index is 31.28 kg/(m^2).  GENERAL: healthy, alert and no distress  NECK: no adenopathy, no asymmetry, masses, or scars and thyroid normal to palpation  RESP: lungs clear to  auscultation - no rales, rhonchi or wheezes  CV: regular rate and rhythm, normal S1 S2, no S3 or S4, no murmur, click or rub,   SKIN: + 3 pitting LE to knee with no stasis dermatitis evident.  Comprehensive back pain exam:  No tenderness, Pain limits the following motions: Extension and L Lateral rotation, Lower extremity strength functional and equal on both sides and Straight leg raise negative bilaterally  PSYCH: mentation appears normal and affect flat    Diagnostic Test Results:  none      ASSESSMENT/PLAN:                                                    1. Chronic low back pain without sciatica, unspecified back pain laterality  - oxyCODONE (ROXICODONE) 10 MG IR tablet; Take 1 tablet (10 mg) by mouth every 8 hours as needed  Dispense: 90 tablet; Refill: 0  -PAIN MANAGEMENT Referral    2. Edema, unspecified type  - order for DME; Equipment being ordered: two pairs moderate knee high support hose  Dispense: 2 Device; Refill: 1    Use medication as directed.  Follow up per Dr. Hoyt, Pain management  Follow up with PCP in 1 month, sooner if needed.  Patient amenable to this follow up plan.     Nazario Mcneal PA-C  [facility]

## 2017-06-08 NOTE — MR AVS SNAPSHOT
After Visit Summary   6/8/2017    Kim Anne    MRN: [id number]           Patient Information     Date Of Birth          1945        Visit Information        Provider Department      6/8/2017 1:30 PM Nazario Mcneal PA-C [facility]        Today's Diagnoses     Chronic low back pain without sciatica, unspecified back pain laterality        Edema, unspecified type          Care Instructions      Caring for Your Back Throughout the Day  Take care of your back throughout the day. You will likely have fewer back problems if you do. Try to warm up before you move. Shift positions often. Also do your best to form healthy habits.    Warm up for the day  Do a few slow, catlike stretches before starting your day. This simple warmup can soften your disks, stretch your back muscles, and help prevent injuries.  Shift positions often  At work and at home, change positions often. This helps keep your body from getting stiff. Stand up or lean back while you sit. If you can, get up and move every 1/2 hour.  Form healthy habits  Here are some suggestions:     Keep a healthy weight. When you weigh too much, your back is under excess strain. But losing just a few extra pounds can help a lot.    Try not to overeat. Learn about serving sizes. The size of a serving depends on the food and the food group. Many foods list serving sizes on the labels.    Handle minor aches with cold and heat. Apply cold the first 24 to 48 hours. Use heat after that. Always place a thin cloth between your skin and the source of cold or heat.    Take medicines as directed. This helps keep pain under control. Always read labels, and call your healthcare provider or pharmacist if you have any questions.  Walk each day  A daily walk keeps your back and thigh muscles stretched and strong. This gives your back better support. Be sure to walk with your spine s three curves aligned, by keeping your  head, hips, and toes connected by a vertical line.     [id number] [employer]. [address] [place] [address], [place] [address]. All rights reserved. This information is not intended as a substitute for professional medical care. Always follow your healthcare professional's instructions.        Back Care Tips    Caring for your back  These are things you can do to prevent a recurrence of acute back pain and to reduce symptoms from chronic back pain:    Maintain a healthy weight. If you are overweight, losing weight will help most types of back pain.    Exercise is an important part of recovery from most types of back pain. The back is supported by the muscles behind and in front of the spine. This means both the back muscles and the abdominal muscles must be strengthened to provide better support for your spine.     Swimming and brisk walking are good overall exercises to improve your fitness level.    Practice safe lifting methods (below).    Practice good posture when sitting, standing and walking. Avoid prolonged sitting. This puts more stress on the lower back than standing or walking.    Wear quality shoes with sufficient arch support. Foot and ankle alignment can affect back symptoms. Women should avoid high heels.    Therapeutic massage can help  relax the back muscles without stretching them.    During the first 24 to 72 hours after an acute injury or flare-up of chronic back pain, apply an ice pack to the painful area for 20 minutes and then remove it for 20 minutes over a period of 60 to 90 minutes or several times a day. As a safety precaution, do not use a heating pad at bedtime. Sleeping on a heating pad can lead to skin burns or tissue damage.    Ice and heat therapies can be alternated.  Medications  Talk to your doctor before using medications, especially if you have other medical problems or are taking other medicines.    You may use acetaminophen or ibuprofen to control pain, unless other  pain medicine was prescribed. If you have chronic conditions like diabetes, liver or kidney disease, stomach ulcers or gastrointestinal bleeding, or are taking blood thinners, talk with your doctor before taking any meidcations.    Be careful if you are given prescription pain medicines, narcotics, or medication for muscle spasm. They can cause drowsiness, affect your coordination, reflexes and judgment. Do not drive or operate heavy machinery.  Lumbar stretch  Here is a simple stretching exercise that will help relax muscle spasm and keep your back more limber. If exercise makes your back pain worse, don t do it.    Lie on your back with your knees bent and both feet on the ground.    Slowly raise your left knee to your chest as you flatten your lower back against the floor. Hold for 5 seconds.    Relax and repeat the exercise with your right knee.    Do 10 of these exercises for each leg.  Safe lifting method    Don t bend over at the waist to lift an object off the floor.  Instead, bend your knees and hips in a squat.     Keep your back and head upright    Hold the object close to your body, directly in front of you.    Straighten your legs to lift the object.     Lower the object to the floor in the reverse fashion.    If you must slide something across the floor, push it.  Posture tips  Sitting  Sit in chairs with straight backs or low-back support. rKeep your k nees lower than your hip, with your feet flat on the floor.  When driving, sit up straight. Adjust the seat forward so you are not leaning toward the steering wheel.  A small pillow or rolled towel behind your lower back may help if you are driving long distances.   Standing  When standing for long periods, shift most of your weight to one leg at a time. Alternate legs every few minutes.   Sleeping  The best way to sleep is on your side with your knees bent. Put a low pillow under your head to support your neck in a neutral spine position. Avoid thick  pillows that bend your neck to one side. Put a pillow between your legs to further relax your lower back. If you sleep on your back, put pillows under your knees to support your legs in a slightly flexed position. Use a firm mattress. If your mattress sags, replace it, or use a 1/2-inch plywood board under the mattress to add support.  Follow-up care  Follow up with your health care provider or as directed by our staff.  If X-rays, a CT scan or an MRI scan were taken, they will be reviewed by a radiologist. You will be notified of any new findings that may affect your care.  Call 911  Seek emergency medical care if any of the following occur:    Trouble breathing    Confusion    Very drowsy or trouble breathing    Fainting or loss of consciousness    Rapid or very slow heart rate    Loss of  bwel or bladder control  When to seek medical care  Call your health care provider if any of the following occur:    Pain becomes worse or spreads to your arms or legs    Weakness or numbness in one or both arms or legs    Numbness in the groin area    [id number] [employer]. [address] [place] [address]. All rights reserved. This information is not intended as a substitute for professional medical care. Always follow your healthcare professional's instructions.                Follow-ups after your visit        Who to contact     If you have questions or need follow up information about today's clinic visit or your schedule please contact [facility] PRIMARY [facility]ARE directly at [phone].  Normal or non-critical lab and imaging results will be communicated to you by MyChart, letter or phone within 4 business days after the clinic has received the results. If you do not hear from us within 7 days, please contact the clinic through [email or web address] or phone. If you have a critical or abnormal lab result, we will notify you by phone as soon as possible.  Submit refill requests through [employer] or  "call your pharmacy and they will forward the refill request to us. Please allow 3 business days for your refill to be completed.          Additional Information About Your Visit        MyChart Information     [facility] lets you send messages to your doctor, view your test results, renew your prescriptions, schedule appointments and more. To sign up, go to [email or web address] . Click on \"Log in\" on the left side of the screen, which will take you to the Welcome page. Then click on \"Sign up Now\" on the right side of the page.     You will be asked to enter the access code listed below, as well as some personal information. Please follow the directions to create your username and password.     Your access code is: [email or web address]  Expires: 2017 12:21 PM     Your access code will  in 90 days. If you need help or a new code, please call your [place] clinic or [phone].        Care EveryWhere ID     This is your [facility] EveryWhere ID. This could be used by other organizations to access your [place] medical records  [id number]        Your Vitals Were     Pulse Temperature Respirations Pulse Oximetry BMI (Body Mass Index)       59 98.9  F (37.2  C) (Oral) 16 97% 31.28 kg/m2        Blood Pressure from Last 3 Encounters:   17 130/60   05/10/17 130/68   03/10/17 95/69    Weight from Last 3 Encounters:   17 188 lb (85.3 kg)   05/10/17 190 lb 8 oz (86.4 kg)   03/10/17 196 lb (88.9 kg)              Today, you had the following     No orders found for display         Where to get your medicines      Some of these will need a paper prescription and others can be bought over the counter.  Ask your nurse if you have questions.     Bring a paper prescription for each of these medications     order for DME    oxyCODONE 10 MG IR tablet          Primary Care Provider Office Phone #    Mai Babcock [phone]       [facility] [address] [facility] [address] [place] [id number]        Thank " you!     Thank you for choosing [facility]IMARY [facility]  for your care. Our goal is always to provide you with excellent care. Hearing back from our patients is one way we can continue to improve our services. Please take a few minutes to complete the written survey that you may receive in the mail after your visit with us. Thank you!             Your Updated Medication List - Protect others around you: Learn how to safely use, store and throw away your medicines at www.disposemymeds.org.          This list is accurate as of: 6/8/17  2:24 PM.  Always use your most recent med list.                   Brand Name Dispense Instructions for use    albuterol 108 (90 BASE) MCG/ACT Inhaler    PROAIR HFA/PROVENTIL HFA/VENTOLIN HFA    18 g    Inhale 2 puffs into the lungs every 6 hours as needed for shortness of breath / dyspnea or wheezing       ASPIRIN LOW DOSE 81 MG EC tablet   Generic drug:  aspirin     30 tablet    Take 1 tablet (81 mg) by mouth daily       atorvastatin 40 MG tablet    LIPITOR    90 tablet    Take 1 tablet (40 mg) by mouth daily       blood glucose monitoring lancets     1 Box    Test BS four times daily as directed       blood glucose monitoring test strip    FREESTYLE LITE    50 strip    TEST BLOOD SUGAR TWO TIMES A DAY       budesonide-formoterol 80-4.5 MCG/ACT Inhaler    SYMBICORT    1 Inhaler    Inhale 2 puffs into the lungs 2 times daily       docusate sodium 100 MG capsule    COLACE    60 capsule    Take 1 capsule (100 mg) by mouth 2 times daily       EUCERIN CALMING DAILY MOIST Crea     1 Tube    Externally apply 1 dose * topically daily       ferrous sulfate 325 (65 FE) MG tablet    IRON    100 tablet    Take 1 tablet (325 mg) by mouth daily (with breakfast)       furosemide 20 MG tablet    LASIX    60 tablet    Take 1 tablet (20 mg) by mouth daily       glucose-vitamin C 4-0.006 G Chew    DEX4 GLUCOSE    50 tablet    Take 1 tablet (4 g) by mouth every hour as needed for low  blood sugar       insulin glargine 100 UNIT/ML injection    LANTUS    15 mL    Inject 30 Units Subcutaneous every morning       insulin pen needle 31G X 6 MM     120 each    Use as directed       levothyroxine 200 MCG tablet    SYNTHROID/LEVOTHROID    90 tablet    Take 1 tablet (200 mcg) by mouth See Admin Instructions New daily increase. Recheck labs in 8 weeks.       losartan 100 MG tablet    COZAAR    90 tablet    Take 1 tablet (100 mg) by mouth daily       metoprolol 25 MG 24 hr tablet    TOPROL-XL    90 tablet    Take 1 tablet (25 mg) by mouth daily       nitroglycerin 0.4 MG sublingual tablet    NITROSTAT    25 tablet    Place 1 tablet (0.4 mg) under the tongue every 5 minutes as needed for chest pain       nystatin 241562 UNIT/GM Powd    MYCOSTATIN    30 g    Apply 1 g topically 3 times daily as needed       * order for DME     1 Device    One wheeled walker with seat and brakes and basket       * order for DME     2 Device    Equipment being ordered: two pairs moderate knee high support hose       oxyCODONE 10 MG IR tablet    ROXICODONE    90 tablet    Take 1 tablet (10 mg) by mouth every 8 hours as needed       polyethylene glycol powder    MIRALAX    510 g    Take 17 g (1 capful) by mouth daily       SM ALCOHOL PREP 70 % Pads     100 each    Externally apply 1 pad topically 4 times daily       varenicline 0.5 MG X 11 & 1 MG X 42 tablet    CHANTIX STARTING MONTH GRETEL    53 tablet    Take 0.5 mg tab daily for 3 days, then 0.5 mg tab twice daily for 4 days, then 1 mg twice daily.       vitamin D 2000 UNITS tablet     60 tablet    Take 2,000 Units by mouth daily       * Notice:  This list has 2 medication(s) that are the same as other medications prescribed for you. Read the directions carefully, and ask your doctor or other care provider to review them with you.

## 2017-06-08 NOTE — NURSING NOTE
"Chief Complaint   Patient presents with     Back Pain       Initial /60  Pulse 59  Temp 98.9  F (37.2  C) (Oral)  Resp 16  Wt 188 lb (85.3 kg)  SpO2 97%  BMI 31.28 kg/m2 Estimated body mass index is 31.28 kg/(m^2) as calculated from the following:    Height as of 5/10/17: 5' 5\" (1.651 m).    Weight as of this encounter: 188 lb (85.3 kg).  Medication Reconciliation: complete   Anibal Anne MA      "

## 2017-06-20 ENCOUNTER — TELEPHONE (OUTPATIENT)
Dept: FAMILY MEDICINE | Facility: CLINIC | Age: 72
End: 2017-06-20

## 2017-06-20 NOTE — TELEPHONE ENCOUNTER
Staff faxed completed order for Compression Stocking to  [facility] at [phone].      Kae Jacobs MA

## 2017-06-28 ENCOUNTER — TELEPHONE (OUTPATIENT)
Dept: FAMILY MEDICINE | Facility: CLINIC | Age: 72
End: 2017-06-28

## 2017-07-06 ENCOUNTER — CARE COORDINATION (OUTPATIENT)
Dept: GERIATRIC MEDICINE | Facility: CLINIC | Age: 72
End: 2017-07-06

## 2017-07-06 NOTE — PROGRESS NOTES
Arranged transportation thru [facility] for the below appt:  Appt Date & Time: 7/07 @ 10:30 am  Clinic Name & Address:  [address], [address], [place] [address]  Transportation Provider: [employer]   time:  9:30 am    Notified Kim of  time.      Brianna Barrios  Case Management Specialist  [facility]   [phone]

## 2017-07-07 ENCOUNTER — OFFICE VISIT (OUTPATIENT)
Dept: FAMILY MEDICINE | Facility: CLINIC | Age: 72
End: 2017-07-07
Payer: MEDICARE

## 2017-07-07 ENCOUNTER — OFFICE VISIT (OUTPATIENT)
Dept: BEHAVIORAL HEALTH | Facility: CLINIC | Age: 72
End: 2017-07-07
Payer: MEDICARE

## 2017-07-07 ENCOUNTER — TELEPHONE (OUTPATIENT)
Dept: FAMILY MEDICINE | Facility: CLINIC | Age: 72
End: 2017-07-07

## 2017-07-07 VITALS
RESPIRATION RATE: 14 BRPM | SYSTOLIC BLOOD PRESSURE: 88 MMHG | TEMPERATURE: 98.4 F | HEART RATE: 71 BPM | DIASTOLIC BLOOD PRESSURE: 60 MMHG | OXYGEN SATURATION: 96 % | WEIGHT: 188 LBS | BODY MASS INDEX: 31.28 KG/M2

## 2017-07-07 DIAGNOSIS — F17.210 CIGARETTE NICOTINE DEPENDENCE WITHOUT COMPLICATION: ICD-10-CM

## 2017-07-07 DIAGNOSIS — G89.29 CHRONIC LOW BACK PAIN WITHOUT SCIATICA, UNSPECIFIED BACK PAIN LATERALITY: ICD-10-CM

## 2017-07-07 DIAGNOSIS — M54.50 CHRONIC LOW BACK PAIN WITHOUT SCIATICA, UNSPECIFIED BACK PAIN LATERALITY: ICD-10-CM

## 2017-07-07 DIAGNOSIS — E11.22 CONTROLLED TYPE 2 DIABETES MELLITUS WITH STAGE 4 CHRONIC KIDNEY DISEASE, WITH LONG-TERM CURRENT USE OF INSULIN (H): Primary | ICD-10-CM

## 2017-07-07 DIAGNOSIS — R21 RASH: ICD-10-CM

## 2017-07-07 DIAGNOSIS — N18.4 CONTROLLED TYPE 2 DIABETES MELLITUS WITH STAGE 4 CHRONIC KIDNEY DISEASE, WITH LONG-TERM CURRENT USE OF INSULIN (H): Primary | ICD-10-CM

## 2017-07-07 DIAGNOSIS — F43.21 GRIEF: Primary | ICD-10-CM

## 2017-07-07 DIAGNOSIS — Z79.4 CONTROLLED TYPE 2 DIABETES MELLITUS WITH STAGE 4 CHRONIC KIDNEY DISEASE, WITH LONG-TERM CURRENT USE OF INSULIN (H): Primary | ICD-10-CM

## 2017-07-07 DIAGNOSIS — J44.9 CHRONIC OBSTRUCTIVE PULMONARY DISEASE, UNSPECIFIED COPD TYPE (H): ICD-10-CM

## 2017-07-07 PROCEDURE — 90832 PSYTX W PT 30 MINUTES: CPT | Performed by: SOCIAL WORKER

## 2017-07-07 PROCEDURE — 82043 UR ALBUMIN QUANTITATIVE: CPT | Performed by: NURSE PRACTITIONER

## 2017-07-07 PROCEDURE — 99214 OFFICE O/P EST MOD 30 MIN: CPT | Performed by: NURSE PRACTITIONER

## 2017-07-07 RX ORDER — OXYCODONE HYDROCHLORIDE 10 MG/1
10 TABLET ORAL EVERY 8 HOURS PRN
Qty: 90 TABLET | Refills: 0 | Status: SHIPPED | OUTPATIENT
Start: 2017-07-07 | End: 2017-08-07

## 2017-07-07 RX ORDER — DIAPER,BRIEF,INFANT-TODD,DISP
EACH MISCELLANEOUS
Qty: 30 G | Refills: 0 | Status: SHIPPED | OUTPATIENT
Start: 2017-07-07 | End: 2018-01-15

## 2017-07-07 NOTE — PROGRESS NOTES
[facility] - Integrated Primary Care   December 8, 2016      Behavioral Health Clinician Progress Note    Patient Name: Kim Anne           Service Type: Individual      Service Location:   Face to Face in Clinic     Session Start Time: 10:54  Session End Time: 11:13      Session Length: 16 - 37      Attendees: Patient, PCP and Scribe    Visit Activities (Refresh list every visit): [facility]    Diagnostic Assessment Date: TBD  Treatment Plan Review Date: TBD  See Flowsheets for today's PHQ-9 and JUSTINE-7 results  Previous PHQ-9:   PHQ-9 SCORE 12/2/2016 12/12/2016 2/14/2017   Total Score - - -   Total Score - - -   Total Score 0 0 1     Previous JUSTINE-7:   JUSTINE-7 SCORE 1/13/2016 12/2/2016 12/12/2016   Total Score - - -   Total Score 0 0 0   Total Score - - -       NAE LEVEL:  NAE Score (Last Two) 2/18/2013 5/23/2014   NAE Raw Score 48 45   Activation Score 80 73.1   NAE Level 4 4       DATA  Extended Session (60+ minutes): No  Interactive Complexity: No  Crisis: No    Treatment Objective(s) Addressed in This Session:  Target Behavior(s): disease management/lifestyle changes manage diabetes better    Grief / Loss: will process grief/loss issues in an adaptive manner  Psychological distress related to Diabetes    Current Stressors / Issues:  [facility] met with patient at PCP request to assess current behavioral health needs and provide information and support as necessary.  Pt reported that she found out yesterday that her cousin suddenly passed away. She stated that he was only 61 years old and his 10 year old daughter found him when he didn't get up for breakfast. Pt stated that it has been very difficult for her as she was not expecting this. Pt stated that her son currently lives with her as well as his two children. She stated that her son was in the hospital for three months due to diabetes and complications because of this. Pt seemed unclear about his medical conditions and stated that he was supposed to  get a PCA but he never did and he continues to fall and be short of breath. Pt also stated that she was supposed to get a PCA but also did not. Pt seemed very confused throughout the whole visit, especially regarding her own diabetes. Pt denied any anxiety or depression outside of the death of her cousin.    Reviewed new and ongoing stressors  Reviewed mental health symptoms and appropriate coping mechanisms    I affirmed the steps this patient has taken to address physical and behavioral health issues, and offered continued behavioral health services or referral, now or in the future, as needed by the patient.    Progress on Treatment Objective(s) / Homework:  Satisfactory progress - CONTEMPLATION (Considering change and yet undecided); Intervened by assessing the negative and positive thinking (ambivalence) about behavior change    Motivational Interviewing    MI Intervention: Expressed Empathy/Understanding, Supported Autonomy, Collaboration, Evocation, Open-ended questions and Reflections: simple and complex     Change Talk Expressed by the Patient: Committment to change Activation Taking steps    Provider Response to Change Talk: E - Evoked more info from patient about behavior change, A - Affirmed patient's thoughts, decisions, or attempts at behavior change, R - Reflected patient's change talk and S - Summarized patient's change talk statements      Care Plan review completed: No    Medication Review:  No changes to current psychiatric medication(s)    Medication Compliance:  Yes    Changes in Health Issues:   None reported    Chemical Use Review:   Substance Use: Chemical use reviewed, no active concerns identified      Tobacco Use: Yes, increase.  Client reports frequency of use 6 cigarettes daily. Reviewed information and resources for quitting  Reviewed options for assistance and intervention  Provided encouragement to quit     Assessment: Current Emotional / Mental Status (status of significant  symptoms):  Risk status (Self / Other harm or suicidal ideation)  Patient denies a history of suicidal ideation, suicide attempts, self-injurious behavior, homicidal ideation, homicidal behavior and and other safety concerns  Patient denies current fears or concerns for personal safety.  Patient denies current or recent suicidal ideation or behaviors.  Patient denies current or recent homicidal ideation or behaviors.  Patient denies current or recent self injurious behavior or ideation.  Patient denies other safety concerns.  A safety and risk management plan has not been developed at this time, however patient was encouraged to call [place] [phone] should there be a change in any of these risk factors.    Appearance:   Appropriate   Eye Contact:   Fair   Psychomotor Behavior: Normal   Attitude:   Cooperative   Orientation:   All  Speech   Rate / Production: Normal    Volume:  Normal   Mood:    Normal  Affect:    Blunted  Flat   Thought Content:  Clear   Thought Form:  Coherent  Logical   Insight:    Fair     Diagnoses:  1. Grief        Collateral Reports Completed:  Not Applicable    Plan: (Homework, other):  Patient was given information about behavioral services and encouraged to schedule a follow up appointment with the clinic [facility] [place] as needed.  She was also given information about mental health symptoms and treatment options .  CD Recommendations: Maintain Sobriety.  LAMINE Ndiaye, [facility] [place]      ______________________________________________________________________    Integrated Primary Care Behavioral Health Treatment Plan    Patient's Name: Kim Anne  YOB: 1945    Date: To be completed

## 2017-07-07 NOTE — TELEPHONE ENCOUNTER
Staff Left Message for Ayush at [facility] at [phone] stating staff did in fact faxed the DME Order for Compression Stocking and was verified by [email or web address]. Staff re-faxed DME Order to [phone] Ayush Direct Fax#.      Kae Jacobs MA

## 2017-07-07 NOTE — PROGRESS NOTES
SUBJECTIVE:                                                    Kim Anne is a 71 year old  female who presents to clinic today for the following health issues:  [facility]:John Lackey    Patient is here to Establish care and for Refills.     Patient reports that she lives in an apartment and her son lives with her and she helps in taking care of her grand kids. Patient reports that her son was recently discharged from a 3 month hospital admission and he went to rehabilitation. Patient reports that her son has been having difficulties with being short of breath and has been having frequent falls due to his weakness. Patient reports that his grand child who is 8 yrs old will sometimes help his father (patient's son).     Patient reports that she is still waiting for PCA help. Patient reports that there has been some missed telephone calls about this. This writer and the Aviva Lackey ([facility]) will follow-up with the  at the clinic and connect with the patient. Writer has concern about safety in the household and will follow-up with the .     Diabetes   Patient reports that she has had blood sugars ranging between 40's-200's with drinking pop, patient only checking when she thinks its appropriate. Patient denies any symptoms with the low blood sugars.     Patient is checking blood sugars: very occasionally.     Diabetic concerns: other - Patient stated when she gives her insulin her sugar goes really low so she do not use it. She reports she has been giving herself 10-15 units of insulin every so often, and then she stopped with the low blood sugars. Patient reports that she feels better off the insulin.       Symptoms of hypoglycemia (low blood sugar): denies     Paresthesias (numbness or burning in feet) or sores: Yes Numbness and burning when standing up too long, she reports that this is related to her back pain.      Date of last diabetic eye exam: Not Current    Chronic  Pain     Patient reports that her pain level is  4/10 today. Numbness to both toes while standing, bending, lifting things.    Type / Location of Pain: Lower back pain.   Analgesia/pain control:       Recent changes:  same      Overall control: Tolerable with discomfort  Activity level/function:      Daily activities:  Able to do light housework, cooking    Work:  not applicable  Adverse effects:  No  Adherance    Taking medication as directed?  Yes    Participating in other treatments: not applicable  Risk Factors:    Sleep:  Good    Mood/anxiety:  slightly worsened    Recent family or social stressors:  death in family:  Cousin  in his sleep    Other aggravating factors: prolonged sitting, prolonged standing and poor posture  PHQ-9 SCORE 2016   Total Score - - -   Total Score - - -   Total Score 0 0 1     JUSTINE-7 SCORE 2016   Total Score - - -   Total Score 0 0 0   Total Score - - -     Encounter-Level CSA - 2017:                 Controlled Substance Agreement - Scan on 2017  2:30 PM : CONTROLLED SUBSTANCE AGREEMENT (below)          Encounter-Level CSA - 2017:                 Controlled Substance Agreement - Scan on 2017  6:55 PM : CONTROLLED SUBSTANCE AGREEMENT (below)          Encounter-Level CSA - 2017:                 Controlled Substance Agreement - Scan on 3/8/2016  4:25 AM : CONTROLLED SUBSTANCE AGREEMENT 16 (below)          Encounter-Level CSA - 2017:                 Controlled Substance Agreement - Scan on 3/5/2015  9:09 AM : Controlled Substance Agreement 03/04/15 (below)              Amount of exercise or physical activity: Daily walk    Problems taking medications regularly: No    Medication side effects: none    Diet: low salt, Diabetes      Mental Health   Patient reports that she is sad because her cousin  yesterday in sleep. Patient reports that his 10 yr old daughter found him and was wondering why  "his father was not waking up and they called 911 but he had already passed. Patient reports that she will be going out of town to \"her people\" for the .      Status since last visit: increased sadness    See PHQ-9 for current symptoms.  Other associated symptoms: None    Complicating factors: death in the family.   Significant life event:  No   Current substance abuse:  None  Anxiety or Panic symptoms:  No    Sleep - pretty good about 6-8 hrs of sleep. Nocturia x 1  Appetite - good   Exercise - a block with walker. Only uses walker if walking too far. Otherwise reports that she would rather not use it, she reports that she would like not to be dependent on it.     Smoking - cigarettes 10 per day. Reports thinks about quitting everyday.  Patient reports she has quit \"cold turkey\" numerous times, but will always go back to it. Patient reports that she would rather quit at her own time, open to discussing this at the next visit.   Alcohol - no  Street drugs - no  Marijuana - no  Caffeine - 2 cups of coffee, mostly tea.     PHQ-9  English PHQ-9   Any Language               Problems taking medications regularly: No    Medication side effects: none    Diet: low salt    Social History   Substance Use Topics     Smoking status: Current Every Day Smoker     Packs/day: 0.10     Years: 40.00     Types: Cigarettes     Last attempt to quit: 10/31/2016     Smokeless tobacco: Never Used     Alcohol use No        Problem list and histories reviewed & adjusted, as indicated.  Additional history: as documented    Patient Active Problem List   Diagnosis     Hyperlipidemia LDL goal <100     ARAUZ (dyspnea on exertion)     COPD (chronic obstructive pulmonary disease) (H)     Edema     Fatigue     History of MI (myocardial infarction)     Snores     Knee pain, left     Bunion of great toe of left foot     Dystrophic nail     Arthritis     Peripheral vascular disease (H)     Chronic low back pain     Health Care Home     CKD (chronic " kidney disease) stage 4, GFR 15-29 ml/min (H)     Abnormal gait     Advance Care Planning     Vitamin D deficiency     Constipation     Tobacco use disorder     Hypertension goal BP (blood pressure) < 130/80     Bradycardia     Callus of foot     Other chronic pain     Cataracts, bilateral     Hyperopic astigmatism of both eyes     Presbyopia     Asymptomatic bunion, right     Acquired hypothyroidism     Type 2 diabetes mellitus with diabetic chronic kidney disease (H)     Hypertension associated with diabetes (H)     Hyperlipidemia associated with type 2 diabetes mellitus (H)     Obesity (BMI 30-39.9)     History of sick sinus syndrome     Nephrotic syndrome     Pneumonia     Grief     Past Surgical History:   Procedure Laterality Date     APPENDECTOMY       BLEPHAROPLASTY BILATERAL  2013    Procedure: BLEPHAROPLASTY BILATERAL;  BILATERAL UPPER EYELID BLEPHAROPLASTY ;  Surgeon: Olayinka Lyon MD;  Location:  [facility]     CATARACT IOL, RT/LT Bilateral      CHOLECYSTECTOMY       COLONOSCOPY  7/15/2011    polyps repeat in 5 years     elected term pregnancy       HYSTEROSCOPIC PLACEMENT CONTRACEPTIVE DEVICE       KIDNEY SURGERY      donated left kideny     OVARY SURGERY      left for cyst benign     subclavian stent  2010    [facility]       Social History   Substance Use Topics     Smoking status: Current Every Day Smoker     Packs/day: 0.10     Years: 40.00     Types: Cigarettes     Last attempt to quit: 10/31/2016     Smokeless tobacco: Never Used     Alcohol use No     Family History   Problem Relation Age of Onset     DIABETES Mother      brother, MGM, sister     KIDNEY DISEASE Brother      X2 DM two      Alcohol/Drug Child      daughter     Asthma No family hx of      C.A.D. No family hx of      Hypertension No family hx of      CEREBROVASCULAR DISEASE No family hx of      Breast Cancer No family hx of      Cancer - colorectal No family hx of      Prostate Cancer No family hx of       Allergies No family hx of      Alzheimer Disease No family hx of      Anesthesia Reaction No family hx of      Arthritis No family hx of      Blood Disease No family hx of      CANCER No family hx of      Cardiovascular No family hx of      Circulatory No family hx of      Congenital Anomalies No family hx of      Connective Tissue Disorder No family hx of      Depression No family hx of      Eye Disorder No family hx of      Genetic Disorder No family hx of      GASTROINTESTINAL DISEASE No family hx of      Genitourinary Problems No family hx of      Gynecology No family hx of      HEART DISEASE No family hx of      Lipids No family hx of      Musculoskeletal Disorder No family hx of      Neurologic Disorder No family hx of      Obesity No family hx of      OSTEOPOROSIS No family hx of      Psychotic Disorder No family hx of      Respiratory No family hx of      Thyroid Disease No family hx of      Glaucoma No family hx of      Macular Degeneration No family hx of            Current Outpatient Prescriptions   Medication Sig Dispense Refill     oxyCODONE (ROXICODONE) 10 MG IR tablet Take 1 tablet (10 mg) by mouth every 8 hours as needed 90 tablet 0     order for DME Equipment being ordered: two pairs moderate knee high support hose 2 Device 1     atorvastatin (LIPITOR) 40 MG tablet Take 1 tablet (40 mg) by mouth daily 90 tablet 1     blood glucose monitoring (FREESTYLE) lancets Test BS four times daily as directed 1 Box 12     budesonide-formoterol (SYMBICORT) 80-4.5 MCG/ACT Inhaler Inhale 2 puffs into the lungs 2 times daily 1 Inhaler 1     ferrous sulfate (IRON) 325 (65 FE) MG tablet Take 1 tablet (325 mg) by mouth daily (with breakfast) 100 tablet 1     blood glucose monitoring (FREESTYLE LITE) test strip TEST BLOOD SUGAR TWO TIMES A DAY 50 strip 12     levothyroxine (SYNTHROID/LEVOTHROID) 200 MCG tablet Take 1 tablet (200 mcg) by mouth See Admin Instructions New daily increase. Recheck labs in 8 weeks. 90 tablet  1     metoprolol (TOPROL-XL) 25 MG 24 hr tablet Take 1 tablet (25 mg) by mouth daily 90 tablet 1     nystatin (MYCOSTATIN) 963666 UNIT/GM POWD Apply 1 g topically 3 times daily as needed 30 g 1     polyethylene glycol (MIRALAX) powder Take 17 g (1 capful) by mouth daily 510 g 2     Alcohol Swabs (SM ALCOHOL PREP) 70 % PADS Externally apply 1 pad topically 4 times daily 100 each 11     order for DME One wheeled walker with seat and brakes and basket 1 Device 0     varenicline (CHANTIX STARTING MONTH GRETEL) 0.5 MG X 11 & 1 MG X 42 tablet Take 0.5 mg tab daily for 3 days, then 0.5 mg tab twice daily for 4 days, then 1 mg twice daily. 53 tablet 0     glucose-vitamin C (DEX4 GLUCOSE) 4-0.006 G CHEW Take 1 tablet (4 g) by mouth every hour as needed for low blood sugar 50 tablet 3     losartan (COZAAR) 100 MG tablet Take 1 tablet (100 mg) by mouth daily 90 tablet 1     Cholecalciferol (VITAMIN D) 2000 UNITS tablet Take 2,000 Units by mouth daily 60 tablet 2     insulin glargine (LANTUS) 100 UNIT/ML injection Inject 30 Units Subcutaneous every morning 15 mL 3     docusate sodium (COLACE) 100 MG capsule Take 1 capsule (100 mg) by mouth 2 times daily 60 capsule 4     furosemide (LASIX) 20 MG tablet Take 1 tablet (20 mg) by mouth daily 60 tablet 11     insulin pen needle 31G X 6 MM Use as directed 120 each 3     ASPIRIN LOW DOSE 81 MG EC tablet Take 1 tablet (81 mg) by mouth daily 30 tablet 11     nitroglycerin (NITROSTAT) 0.4 MG SL tablet Place 1 tablet (0.4 mg) under the tongue every 5 minutes as needed for chest pain 25 tablet 0     Emollient (EUCERIN CALMING DAILY MOIST) CREA Externally apply 1 dose * topically daily 1 Tube 12     albuterol (PROAIR HFA/PROVENTIL HFA/VENTOLIN HFA) 108 (90 BASE) MCG/ACT Inhaler Inhale 2 puffs into the lungs every 6 hours as needed for shortness of breath / dyspnea or wheezing (Patient not taking: Reported on 7/7/2017) 18 g 3     Allergies   Allergen Reactions     Contrast Dye Hives and Itching      Clonidine      She had as IP and thinks it made her itchy     Diatrizoate Other (See Comments)     Diltiazem      Severe bradycardia     Hydralazine      Craven tab patient thought made her itchy so stopped     Iodine-131      Recent Labs   Lab Test  05/10/17   1025  02/13/17   1102   12/13/16   1036   11/03/16   0545   11/02/16   0622  11/01/16   2335  10/11/16   0842  08/29/16   1652   12/09/15   0946   12/20/13   1022   10/19/11   1111   A1C  6.6*   --    --   6.3*   --    --    --    --    --   7.2*  9.1*   < >  6.8*   < >  6.7*   < >   --    LDL  96   --    --    --    --    --    --    --    --    --   85   --   72   < >  109   < >  85   HDL  66   --    --    --    --    --    --    --    --    --    --    --    --    --   43*   --   49*   TRIG  303*   --    --    --    --    --    --    --    --    --    --    --    --    --   331*   --   175*   ALT   --    --    --    --    --    --    --   21  27   --   23   --   27   < >   --    < >  14   CR  2.25*  2.62*   < >   --    < >  2.39*   < >  2.64*  2.53*  2.24*  2.17*   < >  1.75*   < >   --    < >   --    GFRESTIMATED  21*  18*   < >   --    < >  20*   < >  18*  19*  22*  22*   < >  29*   < >   --    < >   --    GFRESTBLACK  26*  22*   < >   --    < >  24*   < >  22*  23*  26*  27*   < >  35*   < >   --    < >   --    POTASSIUM  5.5*  5.8*   < >   --    < >  4.5   < >  5.6*  5.3  5.5*  5.2   < >  4.9   < >   --    < >   --    TSH   --    --    --    --    --   1.28   --    --    --   17.25*  13.11*   --   1.24   < >   --    < >  6.82*    < > = values in this interval not displayed.      BP Readings from Last 3 Encounters:   07/07/17 (!) 88/60   06/08/17 130/60   05/10/17 130/68    Wt Readings from Last 3 Encounters:   07/07/17 188 lb (85.3 kg)   06/08/17 188 lb (85.3 kg)   05/10/17 190 lb 8 oz (86.4 kg)        Labs reviewed in EPIC  Problem list, Medication list, Allergies, and Medical/Social/Surgical histories reviewed in EPIC and updated as  appropriate.     ROS: 10 point ROS neg other than the symptoms noted above in the HPI.   OBJECTIVE:                                                    BP (!) 88/60  Pulse 71  Temp 98.4  F (36.9  C) (Oral)  Resp 14  Wt 188 lb (85.3 kg)  SpO2 96%  BMI 31.28 kg/m2  Body mass index is 31.28 kg/(m^2).  GENERAL: healthy, alert, well nourished, well hydrated, no distress  EYES: Eyes grossly normal to inspection, extraocular movements - intact, and PERRL  HENT: Hard of Hearing. ear canals- normal; TMs- normal; Nose- normal; Mouth- no ulcers, no lesions  NECK: no tenderness, no adenopathy, no asymmetry, no masses, no stiffness; thyroid- normal to palpation  RESP: Rhonchi on left lung space, no change with cough. Right lung area with good air flow.   CV: regular rates and rhythm, normal S1 S2, no S3 or S4 and no murmur, no click or rub - no homans or cords  ABDOMEN: soft, no tenderness, no  hepatosplenomegaly, no masses, normal bowel sounds  MS: extremities- no gross deformities noted, no edema  SKIN: dry rash under chin, no suspicious lesions, no rashes  NEURO: strength and tone- normal, sensory exam- grossly normal, mentation- intact, speech- normal, reflexes- symmetric  Non focal no aphasia. No facial asymmetry. Finger to nose, rapid alteration, finger thumb opposition, heel knee shin are normal.  BACK: no CVA tenderness, no paralumbar tenderness  LYMPHATICS: ant. cervical- normal, post. cervical- normal, axillary- normal, supraclavicular- normal, inguinal- normal  Mental Status Assessment:  Appearance:   Appropriate   Eye Contact:   Good   Psychomotor Behavior: Normal   Attitude:   Cooperative   Orientation:   All  Speech   Rate / Production: Normal    Volume:  Normal   Mood:    Sad   Affect:    Appropriate   Thought Content:  Clear   Thought Form:  Coherent  Logical   Insight:    Fair   See [facility] notes   Diagnostic Test Results:pending.      ASSESSMENT/PLAN:                                                    (E11.22,   N18.4,  Z79.4) Controlled type 2 diabetes mellitus with stage 4 chronic kidney disease, with long-term current use of insulin (H)  (primary encounter diagnosis)  Comment: Patient reports discontinuation of insulin use due to low blood sugars. Patient did not bring her glucose meter to this visit. Unable to clarify the low levels at this time.   Plan: insulin glargine (LANTUS) 100 UNIT/ML         injection, Albumin Random Urine Quantitative        -Reduced the Lantus dose to 10 units every morning.  -Discussed with patient and she is agreeable to checking her blood sugar at least twice per week until the next visit and then we can re-visit the insulin dosing.     (M54.5,  G89.29) Chronic low back pain without sciatica, unspecified back pain laterality  Comment: Ongoing pain with some numbness to her toes. Patient denies any loss of bowel or bladder control.   Plan: oxyCODONE (ROXICODONE) 10 MG IR tablet        Refill done.     (R21) Rash  Comment: Patient reports itching under her chin.   Plan: hydrocortisone 1 % ointment        -Encouraged patient to use it for 2 weeks and then follow-up at the next visit.     (F17.210) Cigarette nicotine dependence without complication  Comment: Patient reports using about 10 cigarettes per day. Patient has a congested, when asked if she has a cold, she responds that she has a smoker's cough.   Plan: Will address plan to quit smoking on the next visit.     (J44.9) Chronic obstructive pulmonary disease, unspecified COPD type (H)  Comment: Patient has diagnosed COPD and Asthma. Last visit with pulmonary 2/23/2017. Productive cough, unable to clear left lung space during the exam.    Plan: follow-up with pulmonary in August 2017.   -Encouraged patient to use her medications as prescribed.        Patient Instructions   -Refill done.   -Labs today.   -Use cream for 2 weeks and then follow-up at next appointment.       JUNIOR Aguilar [facility]

## 2017-07-07 NOTE — TELEPHONE ENCOUNTER
Incoming call from  Ayush with [facility] they have not received the fax yet, please re-fax to Ayush's direct fax # [phone].  Ayush contact # [phone]        Bucky Weller

## 2017-07-07 NOTE — NURSING NOTE
"Chief Complaint   Patient presents with     Establish Care     Pain     Mental Health Problem     Diabetes       Initial BP (!) 88/60  Pulse 71  Temp 98.4  F (36.9  C) (Oral)  Resp 14  Wt 188 lb (85.3 kg)  SpO2 96%  BMI 31.28 kg/m2 Estimated body mass index is 31.28 kg/(m^2) as calculated from the following:    Height as of 5/10/17: 5' 5\" (1.651 m).    Weight as of this encounter: 188 lb (85.3 kg).  Medication Reconciliation: complete   Kae Jacobs MA      "

## 2017-07-07 NOTE — MR AVS SNAPSHOT
"              After Visit Summary   7/7/2017    Kim Anne    MRN: [id number]           Patient Information     Date Of Birth          1945        Visit Information        Provider Department      7/7/2017 10:30 AM Aviva Lackey LICSW [facility]        Today's Diagnoses     Grief    -  1       Follow-ups after your visit        Your next 10 appointments already scheduled     Sep 01, 2017  9:00 AM CDT   New Visit with JUNIOR Rodriguez CNP   [facility] ([facility])    [address]  [address]  [facility] [address] [phone]            Sep 01, 2017  9:00 AM CDT   Return Visit with LAMINE Chahal   [facility] ([facility])    [address]  [address]  [facility] [id number] [phone]              Who to contact     If you have questions or need follow up information about today's clinic visit or your schedule please contact [facility] [place] [place] directly at [phone].  Normal or non-critical lab and imaging results will be communicated to you by Zigfuhart, letter or phone within 4 business days after the clinic has received the results. If you do not hear from us within 7 days, please contact the clinic through Zigfuhart or phone. If you have a critical or abnormal lab result, we will notify you by phone as soon as possible.  Submit refill requests through [employer] or call your pharmacy and they will forward the refill request to us. Please allow 3 business days for your refill to be completed.          Additional Information About Your Visit        MyChart Information     [employer] lets you send messages to your doctor, view your test results, renew your prescriptions, schedule appointments and more. To sign up, go to [email or web address] . Click on \"Log in\" on the left side of the screen, which " "will take you to the Welcome page. Then click on \"Sign up Now\" on the right side of the page.     You will be asked to enter the access code listed below, as well as some personal information. Please follow the directions to create your username and password.     Your access code is: [email or web address]  Expires: 2017 12:21 PM     Your access code will  in 90 days. If you need help or a new code, please call your [place] clinic or [phone].        Care EveryWhere ID     This is your Care EveryWhere ID. This could be used by other organizations to access your [place] medical records  [id number]         Blood Pressure from Last 3 Encounters:   17 (!) 88/60   17 130/60   05/10/17 130/68    Weight from Last 3 Encounters:   17 188 lb (85.3 kg)   17 188 lb (85.3 kg)   05/10/17 190 lb 8 oz (86.4 kg)              Today, you had the following     No orders found for display         Today's Medication Changes          These changes are accurate as of: 17 11:16 AM.  If you have any questions, ask your nurse or doctor.               Start taking these medicines.        Dose/Directions    hydrocortisone 1 % ointment   Used for:  Rash   Started by:  Ailyn Figueroa APRN CNP        Apply sparingly to affected area three times daily for 14 days.   Quantity:  30 g   Refills:  0         These medicines have changed or have updated prescriptions.        Dose/Directions    insulin glargine 100 UNIT/ML injection   Commonly known as:  LANTUS   This may have changed:  how much to take   Used for:  Controlled type 2 diabetes mellitus with stage 4 chronic kidney disease, with long-term current use of insulin (H)   Changed by:  Ailyn Figueroa APRN CNP        Dose:  10 Units   Inject 10 Units Subcutaneous every morning   Quantity:  15 mL   Refills:  3            Where to get your medicines      These medications were sent to [facility] [place], [place] - [facility] [address] [facility] [address], [facility] [address]     Phone:  [phone]     hydrocortisone 1 % ointment    insulin glargine 100 UNIT/ML injection         Some of these will need a paper prescription and others can be bought over the counter.  Ask your nurse if you have questions.     Bring a paper prescription for each of these medications     oxyCODONE 10 MG IR tablet                Primary Care Provider Office Phone #    Mai Babcock [phone]       [facility] [address]  [facility] [address]        Equal Access to Services     [facility]: Hadii aad ku hadasho Soomaali, waaxda luqadaha, qaybta kaalmada adeegyada, waxay idiin hayaan adeeg keerthi white . So [facility] [phone].    ATENCIÓN: Si habla español, tiene a bailey disposición servicios gratuitos de asistencia lingüística. [facility] [phone].    We comply with applicable federal civil rights laws and [place] laws. We do not discriminate on the basis of race, color, national origin, age, disability sex, sexual orientation or gender identity.            Thank you!     Thank you for choosing [facility]  for your care. Our goal is always to provide you with excellent care. Hearing back from our patients is one way we can continue to improve our services. Please take a few minutes to complete the written survey that you may receive in the mail after your visit with us. Thank you!             Your Updated Medication List - Protect others around you: Learn how to safely use, store and throw away your medicines at www.disposemymeds.org.          This list is accurate as of: 7/7/17 11:16 AM.  Always use your most recent med list.                   Brand Name Dispense Instructions for use Diagnosis    albuterol 108 (90 BASE) MCG/ACT Inhaler    PROAIR HFA/PROVENTIL HFA/VENTOLIN HFA    18 g    Inhale 2 puffs into the lungs every 6 hours as needed for shortness of breath / dyspnea or wheezing     Chronic obstructive pulmonary disease, unspecified COPD type (H)       ASPIRIN LOW DOSE 81 MG EC tablet   Generic drug:  aspirin     30 tablet    Take 1 tablet (81 mg) by mouth daily    History of MI (myocardial infarction), Essential hypertension, benign       atorvastatin 40 MG tablet    LIPITOR    90 tablet    Take 1 tablet (40 mg) by mouth daily    Hyperlipidemia LDL goal <100       blood glucose monitoring lancets     1 Box    Test BS four times daily as directed    Type 2 diabetes mellitus without complication, with long-term current use of insulin (H)       blood glucose monitoring test strip    FREESTYLE LITE    50 strip    TEST BLOOD SUGAR TWO TIMES A DAY    Type 2 diabetes mellitus without complication, with long-term current use of insulin (H)       budesonide-formoterol 80-4.5 MCG/ACT Inhaler    SYMBICORT    1 Inhaler    Inhale 2 puffs into the lungs 2 times daily    Chronic obstructive pulmonary disease, unspecified COPD type (H)       docusate sodium 100 MG capsule    COLACE    60 capsule    Take 1 capsule (100 mg) by mouth 2 times daily    Constipation, unspecified constipation type       EUCERIN CALMING DAILY MOIST Crea     1 Tube    Externally apply 1 dose * topically daily    DM neuro manif type II       ferrous sulfate 325 (65 FE) MG tablet    IRON    100 tablet    Take 1 tablet (325 mg) by mouth daily (with breakfast)    Iron deficiency anemia, unspecified       furosemide 20 MG tablet    LASIX    60 tablet    Take 1 tablet (20 mg) by mouth daily    Hypertension goal BP (blood pressure) < 150/90       glucose-vitamin C 4-0.006 G Chew    DEX4 GLUCOSE    50 tablet    Take 1 tablet (4 g) by mouth every hour as needed for low blood sugar    Controlled type 2 diabetes mellitus with stage 4 chronic kidney disease, with long-term current use of insulin (H)       hydrocortisone 1 % ointment     30 g    Apply sparingly to affected area three times daily for 14 days.    Rash       insulin glargine 100  UNIT/ML injection    LANTUS    15 mL    Inject 10 Units Subcutaneous every morning    Controlled type 2 diabetes mellitus with stage 4 chronic kidney disease, with long-term current use of insulin (H)       insulin pen needle 31G X 6 MM     120 each    Use as directed    Type 2 diabetes mellitus without complication (H)       levothyroxine 200 MCG tablet    SYNTHROID/LEVOTHROID    90 tablet    Take 1 tablet (200 mcg) by mouth See Admin Instructions New daily increase. Recheck labs in 8 weeks.    Other specified hypothyroidism       losartan 100 MG tablet    COZAAR    90 tablet    Take 1 tablet (100 mg) by mouth daily    Hypertension associated with diabetes (H)       metoprolol 25 MG 24 hr tablet    TOPROL-XL    90 tablet    Take 1 tablet (25 mg) by mouth daily    Hypertension associated with diabetes (H)       nitroGLYcerin 0.4 MG sublingual tablet    NITROSTAT    25 tablet    Place 1 tablet (0.4 mg) under the tongue every 5 minutes as needed for chest pain    History of MI (myocardial infarction)       nystatin 794816 UNIT/GM Powd    MYCOSTATIN    30 g    Apply 1 g topically 3 times daily as needed    Groin rash       * order for DME     1 Device    One wheeled walker with seat and brakes and basket    Risk for falls       * order for DME     2 Device    Equipment being ordered: two pairs moderate knee high support hose    Edema, unspecified type       oxyCODONE 10 MG IR tablet    ROXICODONE    90 tablet    Take 1 tablet (10 mg) by mouth every 8 hours as needed    Chronic low back pain without sciatica, unspecified back pain laterality       polyethylene glycol powder    MIRALAX    510 g    Take 17 g (1 capful) by mouth daily    Slow transit constipation       SM ALCOHOL PREP 70 % Pads     100 each    Externally apply 1 pad topically 4 times daily    Type 2 diabetes mellitus without complication, with long-term current use of insulin (H)       varenicline 0.5 MG X 11 & 1 MG X 42 tablet    CHANTIX STARTING MONTH  GRETEL    53 tablet    Take 0.5 mg tab daily for 3 days, then 0.5 mg tab twice daily for 4 days, then 1 mg twice daily.    Encounter for smoking cessation counseling       vitamin D 2000 UNITS tablet     60 tablet    Take 2,000 Units by mouth daily    Vitamin D deficiency disease       * Notice:  This list has 2 medication(s) that are the same as other medications prescribed for you. Read the directions carefully, and ask your doctor or other care provider to review them with you.

## 2017-07-07 NOTE — TELEPHONE ENCOUNTER
Forms received from Ayush with [facility] because of missing info, please check what type of compression is needed.  Forms were placed in Ailyn's folder.  Please complete and fax  back today.        Bucky Weller

## 2017-07-07 NOTE — MR AVS SNAPSHOT
"              After Visit Summary   7/7/2017    Kim Anne    MRN: [id number]           Patient Information     Date Of Birth          1945        Visit Information        Provider Department      7/7/2017 10:30 AM Ailyn Figueroa APRN CNP [facility]        Today's Diagnoses     Rash    -  1    Controlled type 2 diabetes mellitus with stage 4 chronic kidney disease, with long-term current use of insulin (H)        Chronic low back pain without sciatica, unspecified back pain laterality          Care Instructions    -Refill done.   -Labs today.           Follow-ups after your visit        Who to contact     If you have questions or need follow up information about today's clinic visit or your schedule please contact [facility] directly at [phone].  Normal or non-critical lab and imaging results will be communicated to you by MyChart, letter or phone within 4 business days after the clinic has received the results. If you do not hear from us within 7 days, please contact the clinic through MyChart or phone. If you have a critical or abnormal lab result, we will notify you by phone as soon as possible.  Submit refill requests through [employer] [place] or call your pharmacy and they will forward the refill request to us. Please allow 3 business days for your refill to be completed.          Additional Information About Your Visit        MyChart Information     [employer] [place] lets you send messages to your doctor, view your test results, renew your prescriptions, schedule appointments and more. To sign up, go to [email or web address] . Click on \"Log in\" on the left side of the screen, which will take you to the Welcome page. Then click on \"Sign up Now\" on the right side of the page.     You will be asked to enter the access code listed below, as well as some personal information. Please follow the directions to create your username and password.   "   Your access code is: [email or web address]  Expires: 2017 12:21 PM     Your access code will  in 90 days. If you need help or a new code, please call your [facility] or [phone].        Care EveryWhere ID     This is your Care EveryWhere ID. This could be used by other organizations to access your [place] medical records  [id number]        Your Vitals Were     Pulse Temperature Respirations Pulse Oximetry BMI (Body Mass Index)       71 98.4  F (36.9  C) (Oral) 14 96% 31.28 kg/m2        Blood Pressure from Last 3 Encounters:   17 (!) 88/60   17 130/60   05/10/17 130/68    Weight from Last 3 Encounters:   17 188 lb (85.3 kg)   17 188 lb (85.3 kg)   05/10/17 190 lb 8 oz (86.4 kg)              We Performed the Following     Albumin Random Urine Quantitative          Today's Medication Changes          These changes are accurate as of: 17 11:00 AM.  If you have any questions, ask your nurse or doctor.               Start taking these medicines.        Dose/Directions    hydrocortisone 1 % ointment   Used for:  Rash   Started by:  Ailyn Figueroa APRN CNP        Apply sparingly to affected area three times daily for 14 days.   Quantity:  30 g   Refills:  0         These medicines have changed or have updated prescriptions.        Dose/Directions    insulin glargine 100 UNIT/ML injection   Commonly known as:  LANTUS   This may have changed:  how much to take   Used for:  Controlled type 2 diabetes mellitus with stage 4 chronic kidney disease, with long-term current use of insulin (H)   Changed by:  Ailyn Figueroa APRN CNP        Dose:  10 Units   Inject 10 Units Subcutaneous every morning   Quantity:  15 mL   Refills:  3            Where to get your medicines      These medications were sent to [facility] [place], [place] - [address] [facility] [address]  [address] [facility] [address], [facility] [address]     Phone:  [phone]     hydrocortisone 1 % ointment     insulin glargine 100 UNIT/ML injection         Some of these will need a paper prescription and others can be bought over the counter.  Ask your nurse if you have questions.     Bring a paper prescription for each of these medications     oxyCODONE 10 MG IR tablet                Primary Care Provider Office Phone #    Mai Babcock -[phone]       [facility] [address] [facility] [address]  [place] [address]        Equal Access to Services     CECIL TOLLIVER : Hadii aad ku hadasho Soomaali, waaxda luqadaha, qaybta kaalmada adeegyada, waxay idiin hayaan adeeg khconchitash laaudrey . So [facility] [phone].    ATENCIÓN: Si habla espyadira, tiene a bailey disposición servicios gratuitos de asistencia lingüística. Vicenteame al [phone].    We comply with applicable federal civil rights laws and [place] laws. We do not discriminate on the basis of race, color, national origin, age, disability sex, sexual orientation or gender identity.            Thank you!     Thank you for choosing [facility]  for your care. Our goal is always to provide you with excellent care. Hearing back from our patients is one way we can continue to improve our services. Please take a few minutes to complete the written survey that you may receive in the mail after your visit with us. Thank you!             Your Updated Medication List - Protect others around you: Learn how to safely use, store and throw away your medicines at www.disposemymeds.org.          This list is accurate as of: 7/7/17 11:00 AM.  Always use your most recent med list.                   Brand Name Dispense Instructions for use Diagnosis    albuterol 108 (90 BASE) MCG/ACT Inhaler    PROAIR HFA/PROVENTIL HFA/VENTOLIN HFA    18 g    Inhale 2 puffs into the lungs every 6 hours as needed for shortness of breath / dyspnea or wheezing    Chronic obstructive pulmonary disease, unspecified COPD type (H)       ASPIRIN LOW DOSE 81 MG EC tablet    Generic drug:  aspirin     30 tablet    Take 1 tablet (81 mg) by mouth daily    History of MI (myocardial infarction), Essential hypertension, benign       atorvastatin 40 MG tablet    LIPITOR    90 tablet    Take 1 tablet (40 mg) by mouth daily    Hyperlipidemia LDL goal <100       blood glucose monitoring lancets     1 Box    Test BS four times daily as directed    Type 2 diabetes mellitus without complication, with long-term current use of insulin (H)       blood glucose monitoring test strip    FREESTYLE LITE    50 strip    TEST BLOOD SUGAR TWO TIMES A DAY    Type 2 diabetes mellitus without complication, with long-term current use of insulin (H)       budesonide-formoterol 80-4.5 MCG/ACT Inhaler    SYMBICORT    1 Inhaler    Inhale 2 puffs into the lungs 2 times daily    Chronic obstructive pulmonary disease, unspecified COPD type (H)       docusate sodium 100 MG capsule    COLACE    60 capsule    Take 1 capsule (100 mg) by mouth 2 times daily    Constipation, unspecified constipation type       EUCERIN CALMING DAILY MOIST Crea     1 Tube    Externally apply 1 dose * topically daily    DM neuro manif type II       ferrous sulfate 325 (65 FE) MG tablet    IRON    100 tablet    Take 1 tablet (325 mg) by mouth daily (with breakfast)    Iron deficiency anemia, unspecified       furosemide 20 MG tablet    LASIX    60 tablet    Take 1 tablet (20 mg) by mouth daily    Hypertension goal BP (blood pressure) < 150/90       glucose-vitamin C 4-0.006 G Chew    DEX4 GLUCOSE    50 tablet    Take 1 tablet (4 g) by mouth every hour as needed for low blood sugar    Controlled type 2 diabetes mellitus with stage 4 chronic kidney disease, with long-term current use of insulin (H)       hydrocortisone 1 % ointment     30 g    Apply sparingly to affected area three times daily for 14 days.    Rash       insulin glargine 100 UNIT/ML injection    LANTUS    15 mL    Inject 10 Units Subcutaneous every morning    Controlled type 2  diabetes mellitus with stage 4 chronic kidney disease, with long-term current use of insulin (H)       insulin pen needle 31G X 6 MM     120 each    Use as directed    Type 2 diabetes mellitus without complication (H)       levothyroxine 200 MCG tablet    SYNTHROID/LEVOTHROID    90 tablet    Take 1 tablet (200 mcg) by mouth See Admin Instructions New daily increase. Recheck labs in 8 weeks.    Other specified hypothyroidism       losartan 100 MG tablet    COZAAR    90 tablet    Take 1 tablet (100 mg) by mouth daily    Hypertension associated with diabetes (H)       metoprolol 25 MG 24 hr tablet    TOPROL-XL    90 tablet    Take 1 tablet (25 mg) by mouth daily    Hypertension associated with diabetes (H)       nitroGLYcerin 0.4 MG sublingual tablet    NITROSTAT    25 tablet    Place 1 tablet (0.4 mg) under the tongue every 5 minutes as needed for chest pain    History of MI (myocardial infarction)       nystatin [id number] UNIT/GM Powd    MYCOSTATIN    30 g    Apply 1 g topically 3 times daily as needed    Groin rash       * order for DME     1 Device    One wheeled walker with seat and brakes and basket    Risk for falls       * order for DME     2 Device    Equipment being ordered: two pairs moderate knee high support hose    Edema, unspecified type       oxyCODONE 10 MG IR tablet    ROXICODONE    90 tablet    Take 1 tablet (10 mg) by mouth every 8 hours as needed    Chronic low back pain without sciatica, unspecified back pain laterality       polyethylene glycol powder    MIRALAX    510 g    Take 17 g (1 capful) by mouth daily    Slow transit constipation       SM ALCOHOL PREP 70 % Pads     100 each    Externally apply 1 pad topically 4 times daily    Type 2 diabetes mellitus without complication, with long-term current use of insulin (H)       varenicline 0.5 MG X 11 & 1 MG X 42 tablet    CHANTIX STARTING MONTH GRETEL    53 tablet    Take 0.5 mg tab daily for 3 days, then 0.5 mg tab twice daily for 4 days, then 1 mg  twice daily.    Encounter for smoking cessation counseling       vitamin D 2000 UNITS tablet     60 tablet    Take 2,000 Units by mouth daily    Vitamin D deficiency disease       * Notice:  This list has 2 medication(s) that are the same as other medications prescribed for you. Read the directions carefully, and ask your doctor or other care provider to review them with you.

## 2017-07-08 LAB
CREAT UR-MCNC: 84 MG/DL
MICROALBUMIN UR-MCNC: 5320 MG/L
MICROALBUMIN/CREAT UR: 6325.8 MG/G CR (ref 0–25)

## 2017-07-11 NOTE — TELEPHONE ENCOUNTER
APA paperwork was completed by PCP and returned to staff which was then faxed to [phone] ATTN: Ayush.        Kae Jacobs MA

## 2017-08-29 ENCOUNTER — CARE COORDINATION (OUTPATIENT)
Dept: GERIATRIC MEDICINE | Facility: CLINIC | Age: 72
End: 2017-08-29

## 2017-08-29 NOTE — PROGRESS NOTES
Arranged transportation thru [facility] for the below appt:  Appt Date & Time: 9/1/17 @ 9:00am  Clinic Name & Address:  [address] [place] [address]   Transportation Provider: [employer]    time:  8:00am - 8:30am    Notified member of  time.    Justyna Mccain  Case Management Specialist  [facility]  [phone]

## 2017-09-01 ENCOUNTER — OFFICE VISIT (OUTPATIENT)
Dept: BEHAVIORAL HEALTH | Facility: CLINIC | Age: 72
End: 2017-09-01
Payer: MEDICARE

## 2017-09-01 ENCOUNTER — CARE COORDINATION (OUTPATIENT)
Dept: CARE COORDINATION | Facility: CLINIC | Age: 72
End: 2017-09-01

## 2017-09-01 ENCOUNTER — OFFICE VISIT (OUTPATIENT)
Dept: FAMILY MEDICINE | Facility: CLINIC | Age: 72
End: 2017-09-01
Payer: MEDICARE

## 2017-09-01 VITALS
SYSTOLIC BLOOD PRESSURE: 124 MMHG | BODY MASS INDEX: 30.12 KG/M2 | RESPIRATION RATE: 14 BRPM | TEMPERATURE: 98.1 F | OXYGEN SATURATION: 96 % | WEIGHT: 181 LBS | HEART RATE: 68 BPM | DIASTOLIC BLOOD PRESSURE: 82 MMHG

## 2017-09-01 DIAGNOSIS — Z79.4 TYPE 2 DIABETES MELLITUS WITH STAGE 4 CHRONIC KIDNEY DISEASE, WITH LONG-TERM CURRENT USE OF INSULIN (H): ICD-10-CM

## 2017-09-01 DIAGNOSIS — E78.5 HYPERLIPIDEMIA ASSOCIATED WITH TYPE 2 DIABETES MELLITUS (H): ICD-10-CM

## 2017-09-01 DIAGNOSIS — Z00.00 MEDICARE ANNUAL WELLNESS VISIT, SUBSEQUENT: ICD-10-CM

## 2017-09-01 DIAGNOSIS — N18.4 TYPE 2 DIABETES MELLITUS WITH STAGE 4 CHRONIC KIDNEY DISEASE, WITH LONG-TERM CURRENT USE OF INSULIN (H): ICD-10-CM

## 2017-09-01 DIAGNOSIS — F43.21 GRIEF: Primary | ICD-10-CM

## 2017-09-01 DIAGNOSIS — E11.22 TYPE 2 DIABETES MELLITUS WITH STAGE 4 CHRONIC KIDNEY DISEASE, WITH LONG-TERM CURRENT USE OF INSULIN (H): ICD-10-CM

## 2017-09-01 DIAGNOSIS — N18.4 CKD (CHRONIC KIDNEY DISEASE) STAGE 4, GFR 15-29 ML/MIN (H): ICD-10-CM

## 2017-09-01 DIAGNOSIS — J44.9 CHRONIC OBSTRUCTIVE PULMONARY DISEASE, UNSPECIFIED COPD TYPE (H): Primary | ICD-10-CM

## 2017-09-01 DIAGNOSIS — F17.210 CIGARETTE NICOTINE DEPENDENCE WITHOUT COMPLICATION: ICD-10-CM

## 2017-09-01 DIAGNOSIS — M54.50 CHRONIC LOW BACK PAIN WITHOUT SCIATICA, UNSPECIFIED BACK PAIN LATERALITY: ICD-10-CM

## 2017-09-01 DIAGNOSIS — Z00.00 HEALTH CARE MAINTENANCE: ICD-10-CM

## 2017-09-01 DIAGNOSIS — G89.29 CHRONIC LOW BACK PAIN WITHOUT SCIATICA, UNSPECIFIED BACK PAIN LATERALITY: ICD-10-CM

## 2017-09-01 DIAGNOSIS — E11.69 HYPERLIPIDEMIA ASSOCIATED WITH TYPE 2 DIABETES MELLITUS (H): ICD-10-CM

## 2017-09-01 PROCEDURE — 90832 PSYTX W PT 30 MINUTES: CPT | Performed by: SOCIAL WORKER

## 2017-09-01 PROCEDURE — 99214 OFFICE O/P EST MOD 30 MIN: CPT | Performed by: NURSE PRACTITIONER

## 2017-09-01 RX ORDER — OXYCODONE HYDROCHLORIDE 10 MG/1
10 TABLET ORAL EVERY 8 HOURS PRN
Qty: 90 TABLET | Refills: 0 | Status: SHIPPED | OUTPATIENT
Start: 2017-09-07 | End: 2017-10-04

## 2017-09-01 NOTE — PROGRESS NOTES
SUBJECTIVE:                                                    Kim Anne is a 71 year old female who presents to clinic today for the following health issues:  [facility]:Aviva Lackey    Patient states that she is still waiting for PCA help and said that her previous PCP was working on this. Patient does not know if she has a . Patient met with Nikia MCCLOUD) during the visit and she will follow-up with her  and work on getting the patient PCA help at home.     Patient forgot to take her medications this morning and bring her glucometer. Patient states that she was running behind and the cab came too early and distracted her. Discussed patient bringing in all her medications and glucometer for the next visit with writer and MTM.     Diabetes Follow-up  Patient forgot to bring her meter. States that her blood sugars run in the low 100's when she checks them in the evening before bed. Patient reports that she takes 10 units of Lantus daily, but adjusts the dose as needed. Discussed that patient should not be adjusting this insulin; patient surprised by this although we had discussed this at the previous visit.       Patient is checking blood sugars: rarely.  Results range around 118    Diabetic concerns: None     Symptoms of hypoglycemia (low blood sugar): none     Paresthesias (numbness or burning in feet) or sores: Yes at night only.     Date of last diabetic eye exam: Possibly in April.    Chronic Pain Follow-Up  Patient states that her current pain level is at 4/10; patient states that the pain starts from the left side of her back pain to leg.   Type / Location of Pain: Lower back pain  Analgesia/pain control:       Recent changes:  Same.  Sometimes its hard when shopping or standing too long.      Overall control: Tolerable with discomfort  Activity level/function:      Daily activities:  Able to do light housework, cooking    Work:  not applicable  Adverse effects:  No  Adherance     Taking medication as directed?  Yes    Participating in other treatments: not applicable  Risk Factors:    Sleep:  Good    Mood/anxiety:  controlled    Recent family or social stressors: as noted.     Other aggravating factors: repetitive activities - going up and down the stairs with baskets of laundry.   PHQ-9 SCORE 12/2/2016 12/12/2016 2/14/2017   Total Score - - -   Total Score - - -   Total Score 0 0 1     JUSTINE-7 SCORE 1/13/2016 12/2/2016 12/12/2016   Total Score - - -   Total Score 0 0 0   Total Score - - -     Encounter-Level CSA - 02/13/2017:          Controlled Substance Agreement - Scan on 2/16/2017  2:30 PM : CONTROLLED SUBSTANCE AGREEMENT (below)            Encounter-Level CSA - 02/13/2017:          Controlled Substance Agreement - Scan on 1/16/2017  6:55 PM : CONTROLLED SUBSTANCE AGREEMENT (below)            Encounter-Level CSA - 02/13/2017:          Controlled Substance Agreement - Scan on 3/8/2016  4:25 AM : CONTROLLED SUBSTANCE AGREEMENT 03/07/16 (below)            Encounter-Level CSA - 02/13/2017:          Controlled Substance Agreement - Scan on 3/5/2015  9:09 AM : Controlled Substance Agreement 03/04/15 (below)                    Mental Health Follow up   Patient is concerned about his son and thinks he should have gone to the nursing home after discharge and now she has to take care of him. She reports that he is weak and she has to help take care of him and his grandson. Patient has a grandson about 8 years old that helps take care of his son. Son has lived with her for 10 years. Patient reports that her grandson started school last week.     Status since last visit: increase stress with taking care of his son.     See PHQ-9 for current symptoms.  Other associated symptoms: None    Complicating factors: as noted.   Significant life event:  No   Current substance abuse:  None  Anxiety or Panic symptoms:  No    Sleep - good once she falls asleep.   Appetite - good.   Exercise - no. Just normal  house work.     Smoking - 8 cigarettes per day from 10.  Alcohol - no  Street drugs - no  Marijuana - no  Caffeine - 1-2 cups of coffee. Seldom use of soda.     PHQ-9  English PHQ-9   Any Language               Problems taking medications regularly: No    Medication side effects: none    Diet: regular (no restrictions)  Social History   Substance Use Topics     Smoking status: Current Every Day Smoker     Packs/day: 0.10     Years: 40.00     Types: Cigarettes     Last attempt to quit: 10/31/2016     Smokeless tobacco: Never Used     Alcohol use No        Problem list and histories reviewed & adjusted, as indicated.  Additional history: as documented    Patient Active Problem List   Diagnosis     Hyperlipidemia LDL goal <100     ARAUZ (dyspnea on exertion)     COPD (chronic obstructive pulmonary disease) (H)     Edema     Fatigue     History of MI (myocardial infarction)     Snores     Knee pain, left     Bunion of great toe of left foot     Dystrophic nail     Arthritis     Peripheral vascular disease (H)     Chronic low back pain     Health Care Home     CKD (chronic kidney disease) stage 4, GFR 15-29 ml/min (H)     Abnormal gait     Advance Care Planning     Vitamin D deficiency     Constipation     Hypertension goal BP (blood pressure) < 130/80     Bradycardia     Callus of foot     Other chronic pain     Cataracts, bilateral     Hyperopic astigmatism of both eyes     Presbyopia     Asymptomatic bunion, right     Acquired hypothyroidism     Type 2 diabetes mellitus with diabetic chronic kidney disease (H)     Hypertension associated with diabetes (H)     Hyperlipidemia associated with type 2 diabetes mellitus (H)     Obesity (BMI 30-39.9)     History of sick sinus syndrome     Nephrotic syndrome     Pneumonia     Grief     Cigarette nicotine dependence without complication     Past Surgical History:   Procedure Laterality Date     APPENDECTOMY       BLEPHAROPLASTY BILATERAL  9/18/2013    Procedure: BLEPHAROPLASTY  BILATERAL;  BILATERAL UPPER EYELID BLEPHAROPLASTY ;  Surgeon: Olayinka Lyon MD;  Location: [facility]     CATARACT IOL, RT/LT Bilateral      CHOLECYSTECTOMY       COLONOSCOPY  7/15/2011    polyps repeat in 5 years     elected term pregnancy       HYSTEROSCOPIC PLACEMENT CONTRACEPTIVE DEVICE       KIDNEY SURGERY      donated left kideny     OVARY SURGERY      left for cyst benign     subclavian stent  2010    [facility]       Social History   Substance Use Topics     Smoking status: Current Every Day Smoker     Packs/day: 0.10     Years: 40.00     Types: Cigarettes     Last attempt to quit: 10/31/2016     Smokeless tobacco: Never Used     Alcohol use No     Family History   Problem Relation Age of Onset     DIABETES Mother      brother, MGM, sister     KIDNEY DISEASE Brother      X2 DM two      Alcohol/Drug Child      daughter     Asthma No family hx of      C.A.D. No family hx of      Hypertension No family hx of      CEREBROVASCULAR DISEASE No family hx of      Breast Cancer No family hx of      Cancer - colorectal No family hx of      Prostate Cancer No family hx of      Allergies No family hx of      Alzheimer Disease No family hx of      Anesthesia Reaction No family hx of      Arthritis No family hx of      Blood Disease No family hx of      CANCER No family hx of      Cardiovascular No family hx of      Circulatory No family hx of      Congenital Anomalies No family hx of      Connective Tissue Disorder No family hx of      Depression No family hx of      Eye Disorder No family hx of      Genetic Disorder No family hx of      GASTROINTESTINAL DISEASE No family hx of      Genitourinary Problems No family hx of      Gynecology No family hx of      HEART DISEASE No family hx of      Lipids No family hx of      Musculoskeletal Disorder No family hx of      Neurologic Disorder No family hx of      Obesity No family hx of      OSTEOPOROSIS No family hx of      Psychotic Disorder No family hx of       Respiratory No family hx of      Thyroid Disease No family hx of      Glaucoma No family hx of      Macular Degeneration No family hx of            Current Outpatient Prescriptions   Medication Sig Dispense Refill     oxyCODONE (ROXICODONE) 10 MG IR tablet Take 1 tablet (10 mg) by mouth every 8 hours as needed 90 tablet 0     insulin glargine (LANTUS) 100 UNIT/ML injection Inject 10 Units Subcutaneous every morning 15 mL 3     hydrocortisone 1 % ointment Apply sparingly to affected area three times daily for 14 days. 30 g 0     order for DME Equipment being ordered: two pairs moderate knee high support hose 2 Device 1     albuterol (PROAIR HFA/PROVENTIL HFA/VENTOLIN HFA) 108 (90 BASE) MCG/ACT Inhaler Inhale 2 puffs into the lungs every 6 hours as needed for shortness of breath / dyspnea or wheezing (Patient not taking: Reported on 7/7/2017) 18 g 3     atorvastatin (LIPITOR) 40 MG tablet Take 1 tablet (40 mg) by mouth daily 90 tablet 1     blood glucose monitoring (FREESTYLE) lancets Test BS four times daily as directed (Patient not taking: Reported on 7/7/2017) 1 Box 12     budesonide-formoterol (SYMBICORT) 80-4.5 MCG/ACT Inhaler Inhale 2 puffs into the lungs 2 times daily 1 Inhaler 1     ferrous sulfate (IRON) 325 (65 FE) MG tablet Take 1 tablet (325 mg) by mouth daily (with breakfast) 100 tablet 1     blood glucose monitoring (FREESTYLE LITE) test strip TEST BLOOD SUGAR TWO TIMES A DAY (Patient not taking: Reported on 7/7/2017) 50 strip 12     levothyroxine (SYNTHROID/LEVOTHROID) 200 MCG tablet Take 1 tablet (200 mcg) by mouth See Admin Instructions New daily increase. Recheck labs in 8 weeks. 90 tablet 1     metoprolol (TOPROL-XL) 25 MG 24 hr tablet Take 1 tablet (25 mg) by mouth daily 90 tablet 1     nystatin (MYCOSTATIN) 103802 UNIT/GM POWD Apply 1 g topically 3 times daily as needed 30 g 1     polyethylene glycol (MIRALAX) powder Take 17 g (1 capful) by mouth daily 510 g 2     Alcohol Swabs (SM ALCOHOL  PREP) 70 % PADS Externally apply 1 pad topically 4 times daily (Patient not taking: Reported on 7/7/2017) 100 each 11     order for DME One wheeled walker with seat and brakes and basket 1 Device 0     varenicline (CHANTIX STARTING MONTH PAK) 0.5 MG X 11 & 1 MG X 42 tablet Take 0.5 mg tab daily for 3 days, then 0.5 mg tab twice daily for 4 days, then 1 mg twice daily. 53 tablet 0     glucose-vitamin C (DEX4 GLUCOSE) 4-0.006 G CHEW Take 1 tablet (4 g) by mouth every hour as needed for low blood sugar 50 tablet 3     losartan (COZAAR) 100 MG tablet Take 1 tablet (100 mg) by mouth daily 90 tablet 1     Cholecalciferol (VITAMIN D) 2000 UNITS tablet Take 2,000 Units by mouth daily 60 tablet 2     docusate sodium (COLACE) 100 MG capsule Take 1 capsule (100 mg) by mouth 2 times daily 60 capsule 4     furosemide (LASIX) 20 MG tablet Take 1 tablet (20 mg) by mouth daily 60 tablet 11     insulin pen needle 31G X 6 MM Use as directed (Patient not taking: Reported on 7/7/2017) 120 each 3     ASPIRIN LOW DOSE 81 MG EC tablet Take 1 tablet (81 mg) by mouth daily 30 tablet 11     nitroglycerin (NITROSTAT) 0.4 MG SL tablet Place 1 tablet (0.4 mg) under the tongue every 5 minutes as needed for chest pain 25 tablet 0     Emollient (EUCERIN CALMING DAILY MOIST) CREA Externally apply 1 dose * topically daily 1 Tube 12     Allergies   Allergen Reactions     Contrast Dye Hives and Itching     Clonidine      She had as IP and thinks it made her itchy     Diatrizoate Other (See Comments)     Diltiazem      Severe bradycardia     Hydralazine      Granite tab patient thought made her itchy so stopped     Iodine-131      Recent Labs   Lab Test  05/10/17   1025  02/13/17   1102   12/13/16   1036   11/03/16   0545   11/02/16   0622  11/01/16   2335  10/11/16   0842  08/29/16   1652   12/09/15   0946   12/20/13   1022   10/19/11   1111   A1C  6.6*   --    --   6.3*   --    --    --    --    --   7.2*  9.1*   < >  6.8*   < >  6.7*   < >   --     LDL  96   --    --    --    --    --    --    --    --    --   85   --   72   < >  109   < >  85   HDL  66   --    --    --    --    --    --    --    --    --    --    --    --    --   43*   --   49*   TRIG  303*   --    --    --    --    --    --    --    --    --    --    --    --    --   331*   --   175*   ALT   --    --    --    --    --    --    --   21  27   --   23   --   27   < >   --    < >  14   CR  2.25*  2.62*   < >   --    < >  2.39*   < >  2.64*  2.53*  2.24*  2.17*   < >  1.75*   < >   --    < >   --    GFRESTIMATED  21*  18*   < >   --    < >  20*   < >  18*  19*  22*  22*   < >  29*   < >   --    < >   --    GFRESTBLACK  26*  22*   < >   --    < >  24*   < >  22*  23*  26*  27*   < >  35*   < >   --    < >   --    POTASSIUM  5.5*  5.8*   < >   --    < >  4.5   < >  5.6*  5.3  5.5*  5.2   < >  4.9   < >   --    < >   --    TSH   --    --    --    --    --   1.28   --    --    --   17.25*  13.11*   --   1.24   < >   --    < >  6.82*    < > = values in this interval not displayed.      BP Readings from Last 3 Encounters:   07/07/17 (!) 88/60   06/08/17 130/60   05/10/17 130/68    Wt Readings from Last 3 Encounters:   07/07/17 188 lb (85.3 kg)   06/08/17 188 lb (85.3 kg)   05/10/17 190 lb 8 oz (86.4 kg)        Labs reviewed in EPIC  Problem list, Medication list, Allergies, and Medical/Social/Surgical histories reviewed in [facility] and updated as appropriate.     ROS: Constitutional, neuro, ENT, endocrine, pulmonary, cardiac, gastrointestinal, genitourinary, musculoskeletal, integument and psychiatric systems are negative, except as otherwise noted above in the HPI.   OBJECTIVE:                                                    /82 (BP Location: Left arm, Patient Position: Chair, Cuff Size: Adult Regular)  Pulse 68  Temp 98.1  F (36.7  C) (Oral)  Resp 14  Wt 181 lb (82.1 kg)  SpO2 96%  BMI 30.12 kg/m2  Body mass index is 30.12 kg/(m^2).  GENERAL: healthy, alert, well nourished, well  hydrated, no distress  EYES: Eyes grossly normal to inspection, extraocular movements - intact, and PERRL  HENT: ear canals- normal; TMs- normal; Nose- normal; Mouth- no ulcers, no lesions  NECK: no tenderness, no adenopathy, no asymmetry, no masses, no stiffness; thyroid- normal to palpation  RESP: inspiratory wheezes.   CV: regular rates and rhythm, normal S1 S2, no S3 or S4 and no murmur, no click or rub - no homans or cords  ABDOMEN: soft, no tenderness, no  hepatosplenomegaly, no masses, normal bowel sounds  MS: extremities- no gross deformities noted, edema to bilateral legs and feet.   SKIN: no suspicious lesions, no rashes  NEURO: strength and tone- normal, sensory exam- grossly normal, mentation- intact, speech- normal, reflexes- symmetric  Non focal no aphasia. No facial asymmetry. Finger to nose, rapid alteration, finger thumb opposition, heel knee shin are normal.  BACK: no CVA tenderness, no paralumbar tenderness    Mental Status Assessment:  Appearance:   Appropriate   Eye Contact:   Fair   Psychomotor Behavior: Normal   Attitude:   Cooperative   Orientation:   All  Speech   Rate / Production: Normal    Volume:  Normal   Mood:    Normal  Affect:    Appropriate   Thought Content:  Clear  forgetful   Thought Form:  Coherent  Goal Directed  Logical   Insight:    Fair   Attention Span and Concentration:  limited  Recent and Remote Memory:  forgetful  Fund of Knowledge: appropriate  Muscle Strength and Tone: normal   Suicidal Ideation: reports no thoughts, no intention  Hallucination: No  Paranoid-No  Manic-No  Panic-No  Self harm-No      See [facility] notes     Diagnostic Test Results:  none      ASSESSMENT/PLAN:                                                    (J44.9) Chronic obstructive pulmonary disease, unspecified COPD type (H)  (primary encounter diagnosis)  Comment: Ongoing. Patient with wheezes through out and with a productive cough. Patient not sure when she last saw a pulmonologist. Last visit  2/23/2017.   ASSESSMENT AND PLAN:  Chronic obstructive pulmonary disease with recurrent exacerbation:  She was most recently in the hospital in 11/2016.  She had pneumonia at that time.  I thought about getting a repeat chest x-ray to document clearing of infiltrates; however, she had a chest CT in 05/2016 that showed only a 4 mm (stable over 2 years) pulmonary nodule.  Therefore, I think the likelihood of underlying malignancy is very low.       Compliance has always been an issue for her and I encouraged her to quit smoking and to use her Symbicort on a regular basis.  I did not make any other changes to her medications at this time.  I encouraged her to remain active and she will return to clinic in 6 months, sooner p.r.n.   Plan: Patient needs to be scheduled to see pulmonology for follow-up.       (E11.22,  N18.4,  Z79.4) Type 2 diabetes mellitus with stage 4 chronic kidney disease, with long-term current use of insulin (H)  Comment: Ongoing with compliance issues with her medications.   Plan: Hemoglobin A1c, CBC with platelets,         Comprehensive metabolic panel (BMP + Alb, Alk         Phos, ALT, AST, Total. Bili, TP)            (E11.69,  E78.5) Hyperlipidemia associated with type 2 diabetes mellitus (H)  Comment: Routine labs.   Plan: **Lipid panel reflex to direct LDL FUTURE 1yr            (F17.210) Cigarette nicotine dependence without complication  Comment: Ongoing, patient states that she would like to cut back on her cigarette smoking.   Plan: Discussed meeting with MTM at the next visit and address issue. Patient was previously on Chantix.     (Z00.00) Health care maintenance  Comment: Routine labs.   Plan: Hemoglobin A1c, CBC with platelets,         Comprehensive metabolic panel (BMP + Alb, Alk         Phos, ALT, AST, Total. Bili, TP), TSH with free        T4 reflex, **Lipid panel reflex to direct LDL         FUTURE 1yr            (Z00.00) Medicare annual wellness visit, subsequent  Comment: Routine  annual wellness visit.   Plan: Continue with current therapies. Discuss medication management at the next visit. Encouraged patient to bring in all her medications.     (M54.5,  G89.29) Chronic low back pain without sciatica, unspecified back pain laterality  Comment: Ongoing.   Plan: oxyCODONE (ROXICODONE) 10 MG IR tablet        Refill done.       Patient Instructions   -Las today.   -Follow-up for lab result discussion.   -Call clinic with any questions or concerns.       JUNIOR Aguilar [facility] PRIMARY [facility]

## 2017-09-01 NOTE — NURSING NOTE
"Chief Complaint   Patient presents with     RECHECK       Initial BP (!) 202/86  Pulse 68  Temp 98.1  F (36.7  C) (Oral)  Resp 14  Wt 181 lb (82.1 kg)  SpO2 96%  BMI 30.12 kg/m2 Estimated body mass index is 30.12 kg/(m^2) as calculated from the following:    Height as of 5/10/17: 5' 5\" (1.651 m).    Weight as of this encounter: 181 lb (82.1 kg).  Medication Reconciliation: complete     Gerson Flower, GRIS    "

## 2017-09-01 NOTE — LETTER
[facility] Home  Complex Care Plan  About Me  Patient Name:  Kim Anne    YOB: 1945  Age:   71 year old   Jackie MRN: [id number] Telephone Information:     Home Phone [phone]   Mobile [phone]       Address:    [address]  [facility] [place] [address] Email address:  No e-mail address on record      Emergency Contact(s)  Name Relationship Lgl Grd Work Phone Home Phone Mobile Phone   1. KATIE ALFRED Relative   [phone]           Primary language:  English     needed? No   [facility]:  [phone] op. 1  Other communication barriers: No  Preferred Method of Communication:  Phone  Current living arrangement: I live in a private home with family  Mobility Status/ Medical Equipment: Independent w/Device  Other information to know about me:    Health Maintenance  Health Maintenance Reviewed: Due/Overdue     My Access Plan  Medical Emergency 911   Primary Clinic Line [facility]- [phone]   24 Hour Appointment Line [phone] or  [phone]) (toll-free)   24 Hour Nurse Line [phone] (toll-free)   Preferred Urgent Care     Preferred [facility] [place] 2[place]  [phone]   Preferred Pharmacy [facility] [place] [address] [address] [facility] [address]     Behavioral Health Crisis Line The National Suicide Prevention Lifeline at 1-464.652.6203 or 911     My Care Team Members  Patient Care Team       Relationship Specialty Notifications Start End    Ailyn Figueroa APRN CNP PCP - General Nurse Practitioner - Gerontology  7/7/17     Phone: [phone] Fax: [phone]         [address] [place] [address] [facility] [address]    Batool Mai, RN Case Manager  Admissions 4/18/14     Phone: [phone]          [facility] [address] [place] [address] [facility] [address]    Nicki Ray Other (see comments)  Admissions 4/18/14     Comment:   [facility] Administrative Support    Marcus Frias MD MD Cardiology Admissions 10/15/14     Phone: [phone] Fax: [phone]         [address] [facility] [address] [facility] [address]    Hussain Sloan MD MD Ophthalmology  5/18/15     Phone: [phone] Fax: [phone]         XXX RETIRED XXX [facility] [address]    Cyn Gabriel MD MD Ophthalmology  9/10/15     Phone: [phone] Fax: [phone]         [address] [facility] [address]    Emily Cordova MD MD Internal Medicine  5/14/16     Phone: [phone] Fax: [phone]         [address] [facility] [facility] [address]    Bucky Mccain, RN Nurse Coordinator Cardiology Admissions 12/20/16     Phone: [phone]         Earnestine Nagy Other (see comments)  Admissions 2/17/17     Comment:  [facility] Administrative Support    Jessica Marie   Admissions 9/1/17     Phone: [phone]              My Care Plans  Self Management and Treatment Plan  Goals and (Comments)  Goal #1: Patient will have access to services in the home          Action Plans on File: Not Assessed  Advance Care Plans/Directives Type:        My Medical and Care Information  Problem List   Patient Active Problem List   Diagnosis     Hyperlipidemia LDL goal <100     ARAUZ (dyspnea on exertion)     COPD (chronic obstructive pulmonary disease) (H)     Edema     Fatigue     History of MI (myocardial infarction)     Snores     Knee pain, left     Bunion of great toe of left foot     Dystrophic nail     Arthritis     Peripheral vascular disease (H)     Chronic low back pain     Health Care Home     CKD (chronic kidney disease) stage 4, GFR 15-29 ml/min (H)     Abnormal gait     Advance Care Planning     Vitamin D deficiency     Constipation     Hypertension goal BP (blood pressure) < 130/80     Bradycardia     Callus of foot     Other chronic pain     Cataracts, bilateral     Hyperopic astigmatism of both eyes     Presbyopia      Asymptomatic bunion, right     Acquired hypothyroidism     Type 2 diabetes mellitus with diabetic chronic kidney disease (H)     Hypertension associated with diabetes (H)     Hyperlipidemia associated with type 2 diabetes mellitus (H)     Obesity (BMI 30-39.9)     History of sick sinus syndrome     Nephrotic syndrome     Pneumonia     Grief     Cigarette nicotine dependence without complication      Current Medications and Allergies:  See printed Medication Report.    Care Coordination Start Date: 09/01/17   Frequency of Care Coordination: monthly   Form Last Updated: 09/01/2017

## 2017-09-01 NOTE — PATIENT INSTRUCTIONS
-Las today.   -Follow-up for lab result discussion.   -Call clinic with any questions or concerns.

## 2017-09-01 NOTE — MR AVS SNAPSHOT
After Visit Summary   9/1/2017    Kim Anne    MRN: [id number]           Patient Information     Date Of Birth          1945        Visit Information        Provider Department      9/1/2017 9:00 AM Aviva Lackey LICSW [facility]        Today's Diagnoses     Grief    -  1       Follow-ups after your visit        Your next 10 appointments already scheduled     Sep 29, 2017 10:30 AM CDT   Return Visit with JUNIOR Rodriguez CNP   [facility] ([facility])    [address] [place] [address]  [address] [place] [id number] [phone]            Sep 29, 2017 10:30 AM CDT   Return Visit with LAMINE Chahal   [facility] ([facility])    [address] [place] [address]  [address] [place] [id number] [phone]            Sep 29, 2017 10:30 AM CDT   Office Visit with Pratibha Barth   [facility] ([facility]    [address] [place] [address]  [address] [place] [id number] [phone]           Bring a current list of meds and any records pertaining to this visit. For Physicals, please bring immunization records and any forms needing to be filled out. Please arrive 10 minutes early to complete paperwork.              Who to contact     If you have questions or need follow up information about today's clinic visit or your schedule please contact [facility] directly at [phone].  Normal or non-critical lab and imaging results will be communicated to you by MyChart, letter or phone within 4 business days after the clinic has received the results. If you do not hear from us within 7 days, please contact the clinic through MyChart or phone. If you have a critical or abnormal lab result, we will notify you by phone as soon as  "possible.  Submit refill requests through [employer] or call your pharmacy and they will forward the refill request to us. Please allow 3 business days for your refill to be completed.          Additional Information About Your Visit        [email or web address] Information     [employer] lets you send messages to your doctor, view your test results, renew your prescriptions, schedule appointments and more. To sign up, go to [email or web address] . Click on \"Log in\" on the left side of the screen, which will take you to the Welcome page. Then click on \"Sign up Now\" on the right side of the page.     You will be asked to enter the access code listed below, as well as some personal information. Please follow the directions to create your username and password.     Your access code is: [email or web address]  Expires: 2017  9:37 AM     Your access code will  in 90 days. If you need help or a new code, please call your [place] clinic or [phone].        Care EveryWhere ID     This is your Care EveryWhere ID. This could be used by other organizations to access your [place] medical records  [id number]         Blood Pressure from Last 3 Encounters:   17 124/82   17 (!) 88/60   17 130/60    Weight from Last 3 Encounters:   17 181 lb (82.1 kg)   17 188 lb (85.3 kg)   17 188 lb (85.3 kg)              Today, you had the following     No orders found for display         Where to get your medicines      Some of these will need a paper prescription and others can be bought over the counter.  Ask your nurse if you have questions.     Bring a paper prescription for each of these medications     oxyCODONE 10 MG IR tablet          Primary Care Provider Office Phone # Fax #    JUNIOR Rodriguez [phone]       [address] [facility] [address]  [facility] [place] [address]        Equal Access to Services     CECIL TOLLIVER AH: keo Solano, gail brown, " kassandra cary daisy muir'aan ah. So [facility] [phone].    ATENCIÓN: Si habla florence, tiene a bailey disposición servicios gratuitos de asistencia lingüística. Khadijah duran [phone].    We comply with applicable federal civil rights laws and [place] laws. We do not discriminate on the basis of race, color, national origin, age, disability sex, sexual orientation or gender identity.            Thank you!     Thank you for choosing [facility]  for your care. Our goal is always to provide you with excellent care. Hearing back from our patients is one way we can continue to improve our services. Please take a few minutes to complete the written survey that you may receive in the mail after your visit with us. Thank you!             Your Updated Medication List - Protect others around you: Learn how to safely use, store and throw away your medicines at www.disposemymeds.org.          This list is accurate as of: 9/1/17 10:40 AM.  Always use your most recent med list.                   Brand Name Dispense Instructions for use Diagnosis    albuterol 108 (90 BASE) MCG/ACT Inhaler    PROAIR HFA/PROVENTIL HFA/VENTOLIN HFA    18 g    Inhale 2 puffs into the lungs every 6 hours as needed for shortness of breath / dyspnea or wheezing    Chronic obstructive pulmonary disease, unspecified COPD type (H)       ASPIRIN LOW DOSE 81 MG EC tablet   Generic drug:  aspirin     30 tablet    Take 1 tablet (81 mg) by mouth daily    History of MI (myocardial infarction), Essential hypertension, benign       atorvastatin 40 MG tablet    LIPITOR    90 tablet    Take 1 tablet (40 mg) by mouth daily    Hyperlipidemia LDL goal <100       blood glucose monitoring lancets     1 Box    Test BS four times daily as directed    Type 2 diabetes mellitus without complication, with long-term current use of insulin (H)       blood glucose monitoring test strip    FREESTYLE LITE    50 strip    TEST BLOOD SUGAR TWO TIMES  A DAY    Type 2 diabetes mellitus without complication, with long-term current use of insulin (H)       budesonide-formoterol 80-4.5 MCG/ACT Inhaler    SYMBICORT    1 Inhaler    Inhale 2 puffs into the lungs 2 times daily    Chronic obstructive pulmonary disease, unspecified COPD type (H)       docusate sodium 100 MG capsule    COLACE    60 capsule    Take 1 capsule (100 mg) by mouth 2 times daily    Constipation, unspecified constipation type       EUCERIN CALMING DAILY MOIST Crea     1 Tube    Externally apply 1 dose * topically daily    DM neuro manif type II       ferrous sulfate 325 (65 FE) MG tablet    IRON    100 tablet    Take 1 tablet (325 mg) by mouth daily (with breakfast)    Iron deficiency anemia, unspecified       furosemide 20 MG tablet    LASIX    60 tablet    Take 1 tablet (20 mg) by mouth daily    Hypertension goal BP (blood pressure) < 150/90       glucose-vitamin C 4-0.006 G Chew    DEX4 GLUCOSE    50 tablet    Take 1 tablet (4 g) by mouth every hour as needed for low blood sugar    Controlled type 2 diabetes mellitus with stage 4 chronic kidney disease, with long-term current use of insulin (H)       hydrocortisone 1 % ointment     30 g    Apply sparingly to affected area three times daily for 14 days.    Rash       insulin glargine 100 UNIT/ML injection    LANTUS    15 mL    Inject 10 Units Subcutaneous every morning    Controlled type 2 diabetes mellitus with stage 4 chronic kidney disease, with long-term current use of insulin (H)       insulin pen needle 31G X 6 MM     120 each    Use as directed    Type 2 diabetes mellitus without complication (H)       levothyroxine 200 MCG tablet    SYNTHROID/LEVOTHROID    90 tablet    Take 1 tablet (200 mcg) by mouth See Admin Instructions New daily increase. Recheck labs in 8 weeks.    Other specified hypothyroidism       losartan 100 MG tablet    COZAAR    90 tablet    Take 1 tablet (100 mg) by mouth daily    Hypertension associated with diabetes (H)        metoprolol 25 MG 24 hr tablet    TOPROL-XL    90 tablet    Take 1 tablet (25 mg) by mouth daily    Hypertension associated with diabetes (H)       nitroGLYcerin 0.4 MG sublingual tablet    NITROSTAT    25 tablet    Place 1 tablet (0.4 mg) under the tongue every 5 minutes as needed for chest pain    History of MI (myocardial infarction)       nystatin 412095 UNIT/GM Powd    MYCOSTATIN    30 g    Apply 1 g topically 3 times daily as needed    Groin rash       * order for DME     1 Device    One wheeled walker with seat and brakes and basket    Risk for falls       * order for DME     2 Device    Equipment being ordered: two pairs moderate knee high support hose    Edema, unspecified type       oxyCODONE 10 MG IR tablet   Start taking on:  9/7/2017    ROXICODONE    90 tablet    Take 1 tablet (10 mg) by mouth every 8 hours as needed    Chronic low back pain without sciatica, unspecified back pain laterality       polyethylene glycol powder    MIRALAX    510 g    Take 17 g (1 capful) by mouth daily    Slow transit constipation       SM ALCOHOL PREP 70 % Pads     100 each    Externally apply 1 pad topically 4 times daily    Type 2 diabetes mellitus without complication, with long-term current use of insulin (H)       varenicline 0.5 MG X 11 & 1 MG X 42 tablet    CHANTIX STARTING MONTH GRETEL    53 tablet    Take 0.5 mg tab daily for 3 days, then 0.5 mg tab twice daily for 4 days, then 1 mg twice daily.    Encounter for smoking cessation counseling       vitamin D 2000 UNITS tablet     60 tablet    Take 2,000 Units by mouth daily    Vitamin D deficiency disease       * Notice:  This list has 2 medication(s) that are the same as other medications prescribed for you. Read the directions carefully, and ask your doctor or other care provider to review them with you.

## 2017-09-01 NOTE — PROGRESS NOTES
"[facility] - Integrated Primary Care   September 1, 2017      Behavioral Health Clinician Progress Note    Patient Name: Kim Anne           Service Type: Individual      Service Location:   Face to Face in Clinic     Session Start Time: 9:10am  Session End Time: 9:30am      Session Length: 16 - 37      Attendees: Patient and PCP    Visit Activities (Refresh list every visit): [facility]    Diagnostic Assessment Date: TBD  Treatment Plan Review Date: TBD  See Flowsheets for today's PHQ-9 and JUSTINE-7 results  Previous PHQ-9:   PHQ-9 SCORE 12/2/2016 12/12/2016 2/14/2017   Total Score - - -   Total Score - - -   Total Score 0 0 1     Previous JUSTINE-7:   JUSTINE-7 SCORE 1/13/2016 12/2/2016 12/12/2016   Total Score - - -   Total Score 0 0 0   Total Score - - -       NAE LEVEL:  NAE Score (Last Two) 2/18/2013 5/23/2014   NAE Raw Score 48 45   Activation Score 80 73.1   NAE Level 4 4       DATA  Extended Session (60+ minutes): No  Interactive Complexity: No  Crisis: No    Treatment Objective(s) Addressed in This Session:  Target Behavior(s): disease management/lifestyle changes manage diabetes better    Grief / Loss: will process grief/loss issues in an adaptive manner  Psychological distress related to Diabetes    Current Stressors / Issues:  [facility] met with patient at PCP request to assess current behavioral health needs and provide information and support as necessary.  Pt reported that her son continues to live with her and it has been difficult. She reported that he should have gone to a nursing home after getting discharged from the hospital however he came home and has been extremely weak ever since. Pt stated that he used to be bigger than she was, but now he is \"skin and bones\" and has lost a significant amount of weight. Pt stated that his son also lives with her and is 8 or 9 years old. Pt reported that he is helpful to her and returned to school on Monday. Pt stated that she is concerned about her son " and this causes a lot of stress in her life. Pt reported that she is still waiting on a PCA to come and help her. Writer involved the  for help regarding getting services into the home. Pt was confused regarding if she had had services in the past and was unclear if she had a  or not. Writer discussed with pt if she would let someone in her house to help her and she stated that she would. Pt would often stare off in the distance and seemed to be focused on something else. Pt denied any depression or anxiety and reported that she is doing fine besides her son who comes to struggle.    Reviewed new and ongoing stressors  Reviewed mental health symptoms and appropriate coping mechanisms    I affirmed the steps this patient has taken to address physical and behavioral health issues, and offered continued behavioral health services or referral, now or in the future, as needed by the patient.    Progress on Treatment Objective(s) / Homework:  Satisfactory progress - CONTEMPLATION (Considering change and yet undecided); Intervened by assessing the negative and positive thinking (ambivalence) about behavior change    Motivational Interviewing    MI Intervention: Expressed Empathy/Understanding, Supported Autonomy, Collaboration, Evocation, Open-ended questions and Reflections: simple and complex     Change Talk Expressed by the Patient: Committment to change Activation Taking steps    Provider Response to Change Talk: E - Evoked more info from patient about behavior change, A - Affirmed patient's thoughts, decisions, or attempts at behavior change, R - Reflected patient's change talk and S - Summarized patient's change talk statements      Care Plan review completed: No    Medication Review:  No changes to current psychiatric medication(s)    Medication Compliance:  Yes    Changes in Health Issues:   None reported    Chemical Use Review:   Substance Use: Chemical use reviewed, no active concerns  identified      Tobacco Use: Yes, increase.  Client reports frequency of use 6 cigarettes daily. Reviewed information and resources for quitting  Reviewed options for assistance and intervention  Provided encouragement to quit     Assessment: Current Emotional / Mental Status (status of significant symptoms):  Risk status (Self / Other harm or suicidal ideation)  Patient denies a history of suicidal ideation, suicide attempts, self-injurious behavior, homicidal ideation, homicidal behavior and and other safety concerns  Patient denies current fears or concerns for personal safety.  Patient denies current or recent suicidal ideation or behaviors.  Patient denies current or recent homicidal ideation or behaviors.  Patient denies current or recent self injurious behavior or ideation.  Patient denies other safety concerns.  A safety and risk management plan has not been developed at this time, however patient was encouraged to call [place] [phone] should there be a change in any of these risk factors.    Appearance:   Appropriate   Eye Contact:   Fair   Psychomotor Behavior: Normal   Attitude:   Cooperative   Orientation:   All  Speech   Rate / Production: Impoverished  Monotone    Volume:  Normal   Mood:    Normal  Affect:    Blunted  Flat   Thought Content:  Clear   Thought Form:  Tangential   Insight:    Fair     Diagnoses:  1. Grief        Collateral Reports Completed:  Not Applicable    Plan: (Homework, other):  Patient was given information about behavioral services and encouraged to schedule a follow up appointment with the clinic [facility] as needed.  She was also given information about mental health symptoms and treatment options .  CD Recommendations: Maintain Sobriety.  LAMINE Ndiaye, [facility]      ______________________________________________________________________    Integrated Primary Care Behavioral Health Treatment Plan    Patient's Name: Kim Anne  YOB: 1945    Date: To be  completed

## 2017-09-01 NOTE — PROGRESS NOTES
Clinic Care Coordination Contact  Care Team Conversations    SW met with patient at request of C and PCP. Patient is in need of PCA. SW reviewed chart. Patient is working with Batool Mai RN, BSN, PHN with [facility] [phone] who is patient's community care coordinator.     SW met with patient and discussed with patient options has with the EW program, also with [facility]. Patient stated that she was interested. VELASQUEZ called and left a message for Batool to call this SW to discuss patient's case and PCP's concerns.     Plan: VELASQUEZ will follow up with patient after talking to Batool.     SARABJIT Xiong    Care Coordinator  [facility], [facility], and [facility]'s   [phone]

## 2017-09-05 ENCOUNTER — CARE COORDINATION (OUTPATIENT)
Dept: GERIATRIC MEDICINE | Facility: CLINIC | Age: 72
End: 2017-09-05

## 2017-09-05 NOTE — PROGRESS NOTES
Call placed to member to schedule annual assessment.  No answer, unable to leave voicemail.  Batool Mai RN, BSN, PHN  [facility]  [phone]   Fax: [phone]

## 2017-09-07 ENCOUNTER — TELEPHONE (OUTPATIENT)
Dept: FAMILY MEDICINE | Facility: CLINIC | Age: 72
End: 2017-09-07

## 2017-09-07 DIAGNOSIS — Z00.00 HEALTH CARE MAINTENANCE: ICD-10-CM

## 2017-09-07 DIAGNOSIS — Z79.4 TYPE 2 DIABETES MELLITUS WITH STAGE 4 CHRONIC KIDNEY DISEASE, WITH LONG-TERM CURRENT USE OF INSULIN (H): ICD-10-CM

## 2017-09-07 DIAGNOSIS — E11.22 TYPE 2 DIABETES MELLITUS WITH STAGE 4 CHRONIC KIDNEY DISEASE, WITH LONG-TERM CURRENT USE OF INSULIN (H): ICD-10-CM

## 2017-09-07 DIAGNOSIS — E78.5 HYPERLIPIDEMIA ASSOCIATED WITH TYPE 2 DIABETES MELLITUS (H): ICD-10-CM

## 2017-09-07 DIAGNOSIS — E11.69 HYPERLIPIDEMIA ASSOCIATED WITH TYPE 2 DIABETES MELLITUS (H): ICD-10-CM

## 2017-09-07 DIAGNOSIS — N18.4 TYPE 2 DIABETES MELLITUS WITH STAGE 4 CHRONIC KIDNEY DISEASE, WITH LONG-TERM CURRENT USE OF INSULIN (H): ICD-10-CM

## 2017-09-07 DIAGNOSIS — E87.5 HYPERKALEMIA: Primary | ICD-10-CM

## 2017-09-07 LAB
ALBUMIN SERPL-MCNC: 1.9 G/DL (ref 3.4–5)
ALP SERPL-CCNC: 158 U/L (ref 40–150)
ALT SERPL W P-5'-P-CCNC: 18 U/L (ref 0–50)
ANION GAP SERPL CALCULATED.3IONS-SCNC: 10 MMOL/L (ref 3–14)
AST SERPL W P-5'-P-CCNC: 19 U/L (ref 0–45)
BILIRUB SERPL-MCNC: 0.2 MG/DL (ref 0.2–1.3)
BUN SERPL-MCNC: 26 MG/DL (ref 7–30)
CALCIUM SERPL-MCNC: 7.8 MG/DL (ref 8.5–10.1)
CHLORIDE SERPL-SCNC: 115 MMOL/L (ref 94–109)
CHOLEST SERPL-MCNC: 213 MG/DL
CO2 SERPL-SCNC: 18 MMOL/L (ref 20–32)
CREAT SERPL-MCNC: 2.7 MG/DL (ref 0.52–1.04)
ERYTHROCYTE [DISTWIDTH] IN BLOOD BY AUTOMATED COUNT: 15.6 % (ref 10–15)
GFR SERPL CREATININE-BSD FRML MDRD: 17 ML/MIN/1.7M2
GLUCOSE SERPL-MCNC: 158 MG/DL (ref 70–99)
HBA1C MFR BLD: 6.4 % (ref 4.3–6)
HCT VFR BLD AUTO: 41 % (ref 35–47)
HDLC SERPL-MCNC: 75 MG/DL
HGB BLD-MCNC: 12.8 G/DL (ref 11.7–15.7)
LDLC SERPL CALC-MCNC: 81 MG/DL
MCH RBC QN AUTO: 28.9 PG (ref 26.5–33)
MCHC RBC AUTO-ENTMCNC: 31.2 G/DL (ref 31.5–36.5)
MCV RBC AUTO: 93 FL (ref 78–100)
NONHDLC SERPL-MCNC: 138 MG/DL
PLATELET # BLD AUTO: 279 10E9/L (ref 150–450)
POTASSIUM SERPL-SCNC: 6.2 MMOL/L (ref 3.4–5.3)
PROT SERPL-MCNC: 5.7 G/DL (ref 6.8–8.8)
RBC # BLD AUTO: 4.43 10E12/L (ref 3.8–5.2)
SODIUM SERPL-SCNC: 143 MMOL/L (ref 133–144)
T4 FREE SERPL-MCNC: 0.48 NG/DL (ref 0.76–1.46)
TRIGL SERPL-MCNC: 283 MG/DL
TSH SERPL DL<=0.005 MIU/L-ACNC: 71.77 MU/L (ref 0.4–4)
WBC # BLD AUTO: 6.9 10E9/L (ref 4–11)

## 2017-09-07 PROCEDURE — 83036 HEMOGLOBIN GLYCOSYLATED A1C: CPT | Performed by: NURSE PRACTITIONER

## 2017-09-07 PROCEDURE — 84439 ASSAY OF FREE THYROXINE: CPT | Performed by: NURSE PRACTITIONER

## 2017-09-07 PROCEDURE — 80053 COMPREHEN METABOLIC PANEL: CPT | Performed by: NURSE PRACTITIONER

## 2017-09-07 PROCEDURE — 85027 COMPLETE CBC AUTOMATED: CPT | Performed by: NURSE PRACTITIONER

## 2017-09-07 PROCEDURE — 84443 ASSAY THYROID STIM HORMONE: CPT | Performed by: NURSE PRACTITIONER

## 2017-09-07 PROCEDURE — 36415 COLL VENOUS BLD VENIPUNCTURE: CPT | Performed by: NURSE PRACTITIONER

## 2017-09-07 PROCEDURE — 80061 LIPID PANEL: CPT | Performed by: NURSE PRACTITIONER

## 2017-09-07 NOTE — TELEPHONE ENCOUNTER
Elevated potassium noted in lab draw today at 6.2.  Forward urgently to Primary Care Provider.   Known history of elevated potassium and renal failure.  Has had relationship with Dr Lopes in Nephrology.   Jazmín Virk, CNP, APNP  [facility]

## 2017-09-08 ENCOUNTER — TELEPHONE (OUTPATIENT)
Dept: FAMILY MEDICINE | Facility: CLINIC | Age: 72
End: 2017-09-08

## 2017-09-08 ENCOUNTER — OFFICE VISIT (OUTPATIENT)
Dept: NEPHROLOGY | Facility: CLINIC | Age: 72
End: 2017-09-08
Attending: PHYSICIAN ASSISTANT
Payer: MEDICARE

## 2017-09-08 DIAGNOSIS — I42.2 HYPERTROPHIC CARDIOMYOPATHY (H): ICD-10-CM

## 2017-09-08 DIAGNOSIS — E87.20 ACIDOSIS: ICD-10-CM

## 2017-09-08 DIAGNOSIS — D63.1 ANEMIA OF CHRONIC RENAL FAILURE, STAGE 4 (SEVERE) (H): ICD-10-CM

## 2017-09-08 DIAGNOSIS — N18.4 CKD (CHRONIC KIDNEY DISEASE) STAGE 4, GFR 15-29 ML/MIN (H): ICD-10-CM

## 2017-09-08 DIAGNOSIS — N18.4 ANEMIA OF CHRONIC RENAL FAILURE, STAGE 4 (SEVERE) (H): ICD-10-CM

## 2017-09-08 DIAGNOSIS — E87.5 HYPERKALEMIA: ICD-10-CM

## 2017-09-08 DIAGNOSIS — Q24.8 LEFT VENTRICULAR OUTFLOW TRACT OBSTRUCTION: Primary | ICD-10-CM

## 2017-09-08 PROCEDURE — 99212 OFFICE O/P EST SF 10 MIN: CPT | Mod: ZF

## 2017-09-08 RX ORDER — FUROSEMIDE 40 MG
40 TABLET ORAL ONCE
Qty: 1 TABLET | Refills: 0 | Status: SHIPPED | OUTPATIENT
Start: 2017-09-08 | End: 2017-09-10

## 2017-09-08 RX ORDER — SODIUM POLYSTYRENE SULFONATE 15 G/60ML
15 SUSPENSION ORAL; RECTAL ONCE
Qty: 60 ML | Refills: 0 | Status: SHIPPED | OUTPATIENT
Start: 2017-09-08 | End: 2017-09-08

## 2017-09-08 RX ORDER — SODIUM POLYSTYRENE SULFONATE 15 G/60ML
15 SUSPENSION ORAL; RECTAL 2 TIMES DAILY
Qty: 120 ML | Refills: 0 | Status: SHIPPED | OUTPATIENT
Start: 2017-09-08 | End: 2017-09-09

## 2017-09-08 ASSESSMENT — PAIN SCALES - GENERAL: PAINLEVEL: MODERATE PAIN (4)

## 2017-09-08 NOTE — PATIENT INSTRUCTIONS
1.  medicine from pharmacy and start taking today.    2.  Have labs checked again on Monday. Low Potasium diet explained.    3. Check blood pressures every day on Right arm.  Call us if higher 150 (top number) or less than 120 top number.    4.  Check weight every day.  If decreasing by more than 5 lbs in 2-3 days or if decreasing steadily, if you feel lightheaded or dizzy, urine is very dark or you feel really thirsty.  Please call us    5.  Kidney Smart class.

## 2017-09-08 NOTE — MR AVS SNAPSHOT
After Visit Summary   9/8/2017    Kim Anne    MRN: [id number]           Patient Information     Date Of Birth          1945        Visit Information        Provider Department      9/8/2017 3:00 PM Wendy Espinal PA-C [facility]        Today's Diagnoses     Left ventricular outflow tract obstruction    -  1    Hyperkalemia        Hypertrophic cardiomyopathy (H)          Care Instructions    1.  medicine from pharmacy and start taking today.    2.  Have labs checked again on Monday.    3. Check blood pressures every day on Right arm.  Call us if higher 150 (top number) or less than 120 top number.    4.  Check weight every day.  If decreasing by more than 5 lbs in 2-3 days or if decreasing steadily, if you feel lightheaded or dizzy, urine is very dark or you feel really thirsty.  Please call us    5.  Kidney Smart class.            Follow-ups after your visit        Additional Services     CARDIOLOGY EVAL ADULT REFERRAL       Your provider has referred you to:  [facility]: [facility] [phone]   [email or web address]    Please be aware that coverage of these services is subject to the terms and limitations of your health insurance plan.  Call member services at your health plan with any benefit or coverage questions.      Type of Referral:  History of HCOM and LVOT. Need regular follow-up    Timeframe requested:  1-2 months  Please bring the following to your appointment:  >>   Any x-rays, CTs or MRIs which have been performed.  Contact the facility where they were done to arrange for  prior to your scheduled appointment.    >>   List of current medications  >>   This referral request   >>   Any documents/labs given to you for this referral                  Follow-up notes from your care team     Return in about 1 month (around 10/8/2017).      Your next 10  appointments already scheduled     Sep 21, 2017  7:30 AM CDT   (Arrive by 7:15 AM)   NEW HEART FAILURE with Jabier Law MD   [facility] ([facility])    [address] [facility] [address]  [address] [facility] [address]   [phone]            Sep 29, 2017 10:30 AM CDT   Return Visit with JUNIOR Rodriguez CNP   [facility]    [address] [place], [place] [address] [address]  [address]  [facility] [id number] [phone]            Sep 29, 2017 10:30 AM CDT   Return Visit with LAMINE Chahal   [facility] ([facility]    [address]   [address]  [facility] [id number] [phone]            Sep 29, 2017 10:30 AM CDT   Office Visit with Pratibha Barth   [facility] [place] ([facility])    [address] [place] [address]  [address] [place] [id number] [phone]           Bring a current list of meds and any records pertaining to this visit. For Physicals, please bring immunization records and any forms needing to be filled out. Please arrive 10 minutes early to complete paperwork.            Oct 13, 2017 10:30 AM CDT   (Arrive by 10:00 AM)   Return Visit with Wendy Espinal PA-C   [facility] ([facility])    [address] [id number] [phone]              Future tests that were ordered for you today     Open Future Orders        Priority Expected Expires Ordered    Basic metabolic panel  (Ca, Cl, CO2, Creat, Gluc, K, Na, BUN) Routine  9/8/2018 9/8/2017            Who to contact     If you have questions or need follow up information about today's clinic visit or your schedule please contact [facility] directly at [phone].  Normal or non-critical lab and imaging results will be communicated to you by Shay  "letter or phone within 4 business days after the clinic has received the results. If you do not hear from us within 7 days, please contact the clinic through Oncodesign or phone. If you have a critical or abnormal lab result, we will notify you by phone as soon as possible.  Submit refill requests through Oncodesign or call your pharmacy and they will forward the refill request to us. Please allow 3 business days for your refill to be completed.          Additional Information About Your Visit        Oncodesign Information     Oncodesign lets you send messages to your doctor, view your test results, renew your prescriptions, schedule appointments and more. To sign up, go to [email or web address] . Click on \"Log in\" on the left side of the screen, which will take you to the Welcome page. Then click on \"Sign up Now\" on the right side of the page.     You will be asked to enter the access code listed below, as well as some personal information. Please follow the directions to create your username and password.     Your access code is: [email or web address]  Expires: 2017  9:37 AM     Your access code will  in 90 days. If you need help or a new code, please call your [facility] clinic or [phone].        Care EveryWhere ID     This is your Care EveryWhere ID. This could be used by other organizations to access your [facility] medical records  [id number]        Your Vitals Were     Pulse Height Pulse Oximetry BMI (Body Mass Index)          66 1.651 m (5' 5\") 93% 30.69 kg/m2         Blood Pressure from Last 3 Encounters:   17 (!) 204/75   17 124/82   17 (!) 88/60    Weight from Last 3 Encounters:   17 83.6 kg (184 lb 6.4 oz)   17 82.1 kg (181 lb)   17 85.3 kg (188 lb)              We Performed the Following     CARDIOLOGY EVAL ADULT REFERRAL          Today's Medication Changes          These changes are accurate as of: 17  3:33 PM.  If you have any questions, ask your nurse or doctor.    "            These medicines have changed or have updated prescriptions.        Dose/Directions    sodium polystyrene 15 GM/60ML suspension   Commonly known as:  KAYEXALATE   This may have changed:  when to take this   Used for:  Hyperkalemia   Changed by:  Wendy Espinal PA-C        Dose:  15 g   Take 60 mLs (15 g) by mouth 2 times daily for 2 doses   Quantity:  120 mL   Refills:  0            Where to get your medicines      These medications were sent to [facility] [place], [place] - [address]  [address], [facility] [address]     Phone:  [phone]     sodium polystyrene 15 GM/60ML suspension                Primary Care Provider Office Phone # Fax #    Ailyn Figueroa, APRN [phone]       [address]  [facility] [address]        Equal Access to Services     CECIL [facility] Hospital[facility]ILEEN : Hadii alysha downingo Soluiz, waaxda luqadaha, qaybta kaalmada adeegyada, kassandra white . So [facility] [phone].    ATENCIÓN: Si habla español, tiene a bailey disposición servicios gratuitos de asistencia lingüística. Llame al [phone].    We comply with applicable federal civil rights laws and [place] laws. We do not discriminate on the basis of race, color, national origin, age, disability sex, sexual orientation or gender identity.            Thank you!     Thank you for choosing [facility]  for your care. Our goal is always to provide you with excellent care. Hearing back from our patients is one way we can continue to improve our services. Please take a few minutes to complete the written survey that you may receive in the mail after your visit with us. Thank you!             Your Updated Medication List - Protect others around you: Learn how to safely use, store and throw away your medicines at www.disposemymeds.org.          This list is accurate as of: 9/8/17  3:33 PM.  Always use your most recent med list.                    Brand Name Dispense Instructions for use Diagnosis    albuterol 108 (90 BASE) MCG/ACT Inhaler    PROAIR HFA/PROVENTIL HFA/VENTOLIN HFA    18 g    Inhale 2 puffs into the lungs every 6 hours as needed for shortness of breath / dyspnea or wheezing    Chronic obstructive pulmonary disease, unspecified COPD type (H)       ASPIRIN LOW DOSE 81 MG EC tablet   Generic drug:  aspirin     30 tablet    Take 1 tablet (81 mg) by mouth daily    History of MI (myocardial infarction), Essential hypertension, benign       atorvastatin 40 MG tablet    LIPITOR    90 tablet    Take 1 tablet (40 mg) by mouth daily    Hyperlipidemia LDL goal <100       blood glucose monitoring lancets     1 Box    Test BS four times daily as directed    Type 2 diabetes mellitus without complication, with long-term current use of insulin (H)       blood glucose monitoring test strip    FREESTYLE LITE    50 strip    TEST BLOOD SUGAR TWO TIMES A DAY    Type 2 diabetes mellitus without complication, with long-term current use of insulin (H)       budesonide-formoterol 80-4.5 MCG/ACT Inhaler    SYMBICORT    1 Inhaler    Inhale 2 puffs into the lungs 2 times daily    Chronic obstructive pulmonary disease, unspecified COPD type (H)       docusate sodium 100 MG capsule    COLACE    60 capsule    Take 1 capsule (100 mg) by mouth 2 times daily    Constipation, unspecified constipation type       EUCERIN CALMING DAILY MOIST Crea     1 Tube    Externally apply 1 dose * topically daily    DM neuro manif type II       ferrous sulfate 325 (65 FE) MG tablet    IRON    100 tablet    Take 1 tablet (325 mg) by mouth daily (with breakfast)    Iron deficiency anemia, unspecified       * furosemide 20 MG tablet    LASIX    60 tablet    Take 1 tablet (20 mg) by mouth daily    Hypertension goal BP (blood pressure) < 150/90       * furosemide 40 MG tablet    LASIX    1 tablet    Take 1 tablet (40 mg) by mouth once for 1 dose    Hyperkalemia       glucose-vitamin C 4-0.006 G Chew     DEX4 GLUCOSE    50 tablet    Take 1 tablet (4 g) by mouth every hour as needed for low blood sugar    Controlled type 2 diabetes mellitus with stage 4 chronic kidney disease, with long-term current use of insulin (H)       hydrocortisone 1 % ointment     30 g    Apply sparingly to affected area three times daily for 14 days.    Rash       insulin glargine 100 UNIT/ML injection    LANTUS    15 mL    Inject 10 Units Subcutaneous every morning    Controlled type 2 diabetes mellitus with stage 4 chronic kidney disease, with long-term current use of insulin (H)       insulin pen needle 31G X 6 MM     120 each    Use as directed    Type 2 diabetes mellitus without complication (H)       levothyroxine 200 MCG tablet    SYNTHROID/LEVOTHROID    90 tablet    Take 1 tablet (200 mcg) by mouth See Admin Instructions New daily increase. Recheck labs in 8 weeks.    Other specified hypothyroidism       losartan 100 MG tablet    COZAAR    90 tablet    Take 1 tablet (100 mg) by mouth daily    Hypertension associated with diabetes (H)       metoprolol 25 MG 24 hr tablet    TOPROL-XL    90 tablet    Take 1 tablet (25 mg) by mouth daily    Hypertension associated with diabetes (H)       nitroGLYcerin 0.4 MG sublingual tablet    NITROSTAT    25 tablet    Place 1 tablet (0.4 mg) under the tongue every 5 minutes as needed for chest pain    History of MI (myocardial infarction)       nystatin 277928 UNIT/GM Powd    MYCOSTATIN    30 g    Apply 1 g topically 3 times daily as needed    Groin rash       * order for DME     1 Device    One wheeled walker with seat and brakes and basket    Risk for falls       * order for DME     2 Device    Equipment being ordered: two pairs moderate knee high support hose    Edema, unspecified type       oxyCODONE 10 MG IR tablet    ROXICODONE    90 tablet    Take 1 tablet (10 mg) by mouth every 8 hours as needed    Chronic low back pain without sciatica, unspecified back pain laterality       polyethylene  glycol powder    MIRALAX    510 g    Take 17 g (1 capful) by mouth daily    Slow transit constipation       SM ALCOHOL PREP 70 % Pads     100 each    Externally apply 1 pad topically 4 times daily    Type 2 diabetes mellitus without complication, with long-term current use of insulin (H)       sodium polystyrene 15 GM/60ML suspension    KAYEXALATE    120 mL    Take 60 mLs (15 g) by mouth 2 times daily for 2 doses    Hyperkalemia       varenicline 0.5 MG X 11 & 1 MG X 42 tablet    CHANTIX STARTING MONTH GRETEL    53 tablet    Take 0.5 mg tab daily for 3 days, then 0.5 mg tab twice daily for 4 days, then 1 mg twice daily.    Encounter for smoking cessation counseling       vitamin D 2000 UNITS tablet     60 tablet    Take 2,000 Units by mouth daily    Vitamin D deficiency disease       * Notice:  This list has 4 medication(s) that are the same as other medications prescribed for you. Read the directions carefully, and ask your doctor or other care provider to review them with you.

## 2017-09-08 NOTE — NURSING NOTE
"Chief Complaint   Patient presents with     RECHECK     Follow up CKD stage 4.       Initial BP (!) 204/75  Pulse 66  Ht 1.651 m (5' 5\")  Wt 83.6 kg (184 lb 6.4 oz)  SpO2 93%  BMI 30.69 kg/m2 Estimated body mass index is 30.69 kg/(m^2) as calculated from the following:    Height as of this encounter: 1.651 m (5' 5\").    Weight as of this encounter: 83.6 kg (184 lb 6.4 oz).  Medication Reconciliation: complete   Yuliet Dickson., CMA    "

## 2017-09-08 NOTE — PROGRESS NOTES
Discussed hyperkalemia management at home with MTM: Pratibha Barth.     Plan to have patient take Lasix 40 mg today and dose patient with Kayexalate 15 g x 1 dose.     Patient to follow-up in clinic Monday for electrolyte recheck and also refer back to nephrology for ongoing care.     JUNIOR Aguilar CNP

## 2017-09-08 NOTE — TELEPHONE ENCOUNTER
[facility] called they don't have Kayexalate and won't be able to transfer to a different phar  because med can't be enter in their computer system, med was cancelled.   Phar contact info:  [phone]      Bucky Weller

## 2017-09-08 NOTE — TELEPHONE ENCOUNTER
Ordered Lasix and Kayexalate. Patient to follow-up in clinic Monday for potassium re-check.   Nurse to call and check on patient for symptoms and to help schedule with Nephrology Dr. Lopes.     JUNIOR Aguilar CNP

## 2017-09-08 NOTE — TELEPHONE ENCOUNTER
Writer called pt back.  Pt answered.     Pt stated that she just got [place].    Pt verbalized understanding of medication instructions from Ailyn.    Kayexalate reordered to [facility] pharmacy as writer received a call From Pt's pharmacy stating they didn't have this medication.    Juan Miles RN

## 2017-09-08 NOTE — TELEPHONE ENCOUNTER
Writer attempted to call pt, No answer.  Unable to LVM.    Will attempt to call back.    Juan Miles RN
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
## 2017-09-08 NOTE — TELEPHONE ENCOUNTER
Medication reordered to [facility] pharmacy as per Pt's request.    Juan Miles RN

## 2017-09-10 VITALS
HEIGHT: 65 IN | BODY MASS INDEX: 30.72 KG/M2 | HEART RATE: 66 BPM | SYSTOLIC BLOOD PRESSURE: 180 MMHG | DIASTOLIC BLOOD PRESSURE: 70 MMHG | OXYGEN SATURATION: 93 % | WEIGHT: 184.4 LBS

## 2017-09-10 RX ORDER — SODIUM BICARBONATE 325 MG/1
650 TABLET ORAL 3 TIMES DAILY
Qty: 180 TABLET | Refills: 3 | Status: SHIPPED | OUTPATIENT
Start: 2017-09-10 | End: 2017-12-15

## 2017-09-10 RX ORDER — FUROSEMIDE 40 MG
40 TABLET ORAL DAILY
Qty: 30 TABLET | Refills: 3 | Status: SHIPPED | OUTPATIENT
Start: 2017-09-10 | End: 2017-12-15
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
## 2017-09-11 DIAGNOSIS — N18.4 CKD (CHRONIC KIDNEY DISEASE) STAGE 4, GFR 15-29 ML/MIN (H): Primary | ICD-10-CM

## 2017-09-11 DIAGNOSIS — N18.4 CKD (CHRONIC KIDNEY DISEASE) STAGE 4, GFR 15-29 ML/MIN (H): ICD-10-CM

## 2017-09-11 DIAGNOSIS — E87.5 HYPERKALEMIA: ICD-10-CM

## 2017-09-11 DIAGNOSIS — J44.9 COPD (CHRONIC OBSTRUCTIVE PULMONARY DISEASE) (H): Primary | ICD-10-CM

## 2017-09-11 DIAGNOSIS — D63.1 ANEMIA OF CHRONIC RENAL FAILURE, STAGE 4 (SEVERE) (H): ICD-10-CM

## 2017-09-11 DIAGNOSIS — N18.4 ANEMIA OF CHRONIC RENAL FAILURE, STAGE 4 (SEVERE) (H): ICD-10-CM

## 2017-09-11 PROBLEM — I42.2 HYPERTROPHIC CARDIOMYOPATHY (H): Chronic | Status: ACTIVE | Noted: 2017-09-11

## 2017-09-11 LAB
ALBUMIN UR-MCNC: >=300 MG/DL
ANION GAP SERPL CALCULATED.3IONS-SCNC: 9 MMOL/L (ref 3–14)
APPEARANCE UR: CLEAR
BACTERIA #/AREA URNS HPF: ABNORMAL /HPF
BILIRUB UR QL STRIP: NEGATIVE
BUN SERPL-MCNC: 26 MG/DL (ref 7–30)
CALCIUM SERPL-MCNC: 8.1 MG/DL (ref 8.5–10.1)
CHLORIDE SERPL-SCNC: 116 MMOL/L (ref 94–109)
CO2 SERPL-SCNC: 20 MMOL/L (ref 20–32)
COLOR UR AUTO: YELLOW
CREAT SERPL-MCNC: 2.76 MG/DL (ref 0.52–1.04)
CREAT UR-MCNC: 31 MG/DL
FERRITIN SERPL-MCNC: 127 NG/ML (ref 8–252)
GFR SERPL CREATININE-BSD FRML MDRD: 17 ML/MIN/1.7M2
GLUCOSE SERPL-MCNC: 100 MG/DL (ref 70–99)
GLUCOSE UR STRIP-MCNC: 100 MG/DL
HGB UR QL STRIP: ABNORMAL
IRON SATN MFR SERPL: 43 % (ref 15–46)
IRON SERPL-MCNC: 73 UG/DL (ref 35–180)
KETONES UR STRIP-MCNC: NEGATIVE MG/DL
LEUKOCYTE ESTERASE UR QL STRIP: NEGATIVE
MAGNESIUM SERPL-MCNC: 1.7 MG/DL (ref 1.6–2.3)
NITRATE UR QL: NEGATIVE
NON-SQ EPI CELLS #/AREA URNS LPF: ABNORMAL /LPF
PH UR STRIP: 7 PH (ref 5–7)
PHOSPHATE SERPL-MCNC: 4.1 MG/DL (ref 2.5–4.5)
POTASSIUM SERPL-SCNC: 4.6 MMOL/L (ref 3.4–5.3)
PROT UR-MCNC: 4.36 G/L
PROT/CREAT 24H UR: 14.11 G/G CR (ref 0–0.2)
PTH-INTACT SERPL-MCNC: 363 PG/ML (ref 12–72)
RBC #/AREA URNS AUTO: ABNORMAL /HPF
SODIUM SERPL-SCNC: 145 MMOL/L (ref 133–144)
SOURCE: ABNORMAL
SP GR UR STRIP: 1.02 (ref 1–1.03)
TIBC SERPL-MCNC: 170 UG/DL (ref 240–430)
UROBILINOGEN UR STRIP-ACNC: 0.2 EU/DL (ref 0.2–1)
WBC #/AREA URNS AUTO: ABNORMAL /HPF

## 2017-09-11 PROCEDURE — 81001 URINALYSIS AUTO W/SCOPE: CPT | Performed by: PHYSICIAN ASSISTANT

## 2017-09-11 PROCEDURE — 84156 ASSAY OF PROTEIN URINE: CPT | Performed by: PHYSICIAN ASSISTANT

## 2017-09-11 PROCEDURE — 83540 ASSAY OF IRON: CPT | Performed by: PHYSICIAN ASSISTANT

## 2017-09-11 PROCEDURE — 83970 ASSAY OF PARATHORMONE: CPT | Performed by: PHYSICIAN ASSISTANT

## 2017-09-11 PROCEDURE — 36415 COLL VENOUS BLD VENIPUNCTURE: CPT | Performed by: PHYSICIAN ASSISTANT

## 2017-09-11 PROCEDURE — 83735 ASSAY OF MAGNESIUM: CPT | Performed by: PHYSICIAN ASSISTANT

## 2017-09-11 PROCEDURE — 80048 BASIC METABOLIC PNL TOTAL CA: CPT | Performed by: PHYSICIAN ASSISTANT

## 2017-09-11 PROCEDURE — 82728 ASSAY OF FERRITIN: CPT | Performed by: PHYSICIAN ASSISTANT

## 2017-09-11 PROCEDURE — 83550 IRON BINDING TEST: CPT | Performed by: PHYSICIAN ASSISTANT

## 2017-09-11 PROCEDURE — 82306 VITAMIN D 25 HYDROXY: CPT | Performed by: PHYSICIAN ASSISTANT

## 2017-09-11 PROCEDURE — 84100 ASSAY OF PHOSPHORUS: CPT | Performed by: PHYSICIAN ASSISTANT

## 2017-09-12 LAB — DEPRECATED CALCIDIOL+CALCIFEROL SERPL-MC: 15 UG/L (ref 20–75)

## 2017-09-13 ENCOUNTER — TELEPHONE (OUTPATIENT)
Dept: NEPHROLOGY | Facility: CLINIC | Age: 72
End: 2017-09-13

## 2017-09-14 ENCOUNTER — CARE COORDINATION (OUTPATIENT)
Dept: GERIATRIC MEDICINE | Facility: CLINIC | Age: 72
End: 2017-09-14

## 2017-09-14 ENCOUNTER — TELEPHONE (OUTPATIENT)
Dept: NEPHROLOGY | Facility: CLINIC | Age: 72
End: 2017-09-14

## 2017-09-14 DIAGNOSIS — N18.4 CHRONIC KIDNEY DISEASE, STAGE IV (SEVERE) (H): Primary | ICD-10-CM

## 2017-09-14 NOTE — PROGRESS NOTES
Scheduled annual assessment with member for 9/20 at 2pm.   Batool Mai RN, BSN, PHN  [facility]  [phone]  Fax: [phone]

## 2017-09-18 DIAGNOSIS — N18.4 CHRONIC KIDNEY DISEASE, STAGE IV (SEVERE) (H): ICD-10-CM

## 2017-09-18 PROCEDURE — 36415 COLL VENOUS BLD VENIPUNCTURE: CPT

## 2017-09-18 PROCEDURE — 80048 BASIC METABOLIC PNL TOTAL CA: CPT

## 2017-09-19 ENCOUNTER — PRE VISIT (OUTPATIENT)
Dept: CARDIOLOGY | Facility: CLINIC | Age: 72
End: 2017-09-19

## 2017-09-19 ENCOUNTER — TELEPHONE (OUTPATIENT)
Dept: TRANSPLANT | Facility: CLINIC | Age: 72
End: 2017-09-19

## 2017-09-19 ENCOUNTER — PRE VISIT (OUTPATIENT)
Dept: PULMONOLOGY | Facility: CLINIC | Age: 72
End: 2017-09-19

## 2017-09-19 ENCOUNTER — TELEPHONE (OUTPATIENT)
Dept: FAMILY MEDICINE | Facility: CLINIC | Age: 72
End: 2017-09-19

## 2017-09-19 DIAGNOSIS — Z76.82 ORGAN TRANSPLANT CANDIDATE: ICD-10-CM

## 2017-09-19 DIAGNOSIS — N18.4 CHRONIC RENAL FAILURE, STAGE 4 (SEVERE) (H): ICD-10-CM

## 2017-09-19 DIAGNOSIS — I10 ESSENTIAL HYPERTENSION: ICD-10-CM

## 2017-09-19 DIAGNOSIS — I73.9 PVD (PERIPHERAL VASCULAR DISEASE) (H): ICD-10-CM

## 2017-09-19 DIAGNOSIS — J98.4 DISEASE OF LUNG: ICD-10-CM

## 2017-09-19 DIAGNOSIS — Z87.891 HISTORY OF TOBACCO USE: ICD-10-CM

## 2017-09-19 DIAGNOSIS — E11.9 DIABETES MELLITUS, TYPE 2 (H): ICD-10-CM

## 2017-09-19 LAB
ANION GAP SERPL CALCULATED.3IONS-SCNC: 11 MMOL/L (ref 3–14)
BUN SERPL-MCNC: 34 MG/DL (ref 7–30)
CALCIUM SERPL-MCNC: 8 MG/DL (ref 8.5–10.1)
CHLORIDE SERPL-SCNC: 114 MMOL/L (ref 94–109)
CO2 SERPL-SCNC: 20 MMOL/L (ref 20–32)
CREAT SERPL-MCNC: 2.7 MG/DL (ref 0.52–1.04)
GFR SERPL CREATININE-BSD FRML MDRD: 17 ML/MIN/1.7M2
GLUCOSE SERPL-MCNC: 127 MG/DL (ref 70–99)
POTASSIUM SERPL-SCNC: 4.3 MMOL/L (ref 3.4–5.3)
SODIUM SERPL-SCNC: 145 MMOL/L (ref 133–144)

## 2017-09-19 NOTE — LETTER
09/25/17    Kim Anne  [address]  [place] [address]          Dear Kim,    Thank you for your interest in the Transplant Center at [facility], [facility]. We look forward to being a part your care team and assisting you through the transplant process.    As we discussed, your transplant coordinator is Carlota Beckwith ([phone]).  You may call your coordinator at any time with questions or concerns. Your first scheduled call will be on October 10th between 8am and noon. The TENTATIVE date for the evaluation is October 25th at 6:30am.  If this needs to change, call [phone].    Please complete the following.    1. Sign up for:    [employer], your electronic medical record    [employer], the Transplant Center's website (see enclosed booklet)    You can use these tools to learn more about your transplant, communicate with your care team, and track your medical details    2. Fill out and return the enclosed forms    Authorization for Electronic Communication    Authorization to Discuss Protected Health Information    [employer] Release of Information       Best Wishes,      Solid Organ Transplant Intake  [facility], [facility]    Cc: MD Ailyn Jarvis APRN, CNP        MD Emily Cervantes MD

## 2017-09-19 NOTE — Clinical Note
Please see smart set orders for pre kidney transplant evaluation - pt is not on dialysis. Pt does not need a second ABO. Thanks Carlota

## 2017-09-19 NOTE — TELEPHONE ENCOUNTER
1.  Date/reason for appt: 9/28/17, COPD    2.  Referring provider: FLORENTIN GUERRERO    3.  Call to patient (Yes / No - short description): No, referred     4.  Previous care at / records requested from:   [facility]

## 2017-09-19 NOTE — TELEPHONE ENCOUNTER
Pre visit nursing summary    HPI: Kim Anne is here for evaluation for heart failure.    Labs:    Ref. Range 9/7/2017 11:35 9/11/2017 09:28 9/11/2017 09:47   Sodium Latest Ref Range: 133 - 144 mmol/L 143 145 (H)    Potassium Latest Ref Range: 3.4 - 5.3 mmol/L 6.2 (HH) 4.6    Chloride Latest Ref Range: 94 - 109 mmol/L 115 (H) 116 (H)    Carbon Dioxide Latest Ref Range: 20 - 32 mmol/L 18 (L) 20    Urea Nitrogen Latest Ref Range: 7 - 30 mg/dL 26 26    Creatinine Latest Ref Range: 0.52 - 1.04 mg/dL 2.70 (H) 2.76 (H)    GFR Estimate Latest Ref Range: >60 mL/min/1.7m2 17 (L) 17 (L)    GFR Estimate If Black Latest Ref Range: >60 mL/min/1.7m2 21 (L) 20 (L)    Calcium Latest Ref Range: 8.5 - 10.1 mg/dL 7.8 (L) 8.1 (L)    Anion Gap Latest Ref Range: 3 - 14 mmol/L 10 9    Magnesium Latest Ref Range: 1.6 - 2.3 mg/dL  1.7    Phosphorus Latest Ref Range: 2.5 - 4.5 mg/dL  4.1    Albumin Latest Ref Range: 3.4 - 5.0 g/dL 1.9 (L)     Protein Total Latest Ref Range: 6.8 - 8.8 g/dL 5.7 (L)     Bilirubin Total Latest Ref Range: 0.2 - 1.3 mg/dL 0.2     Alkaline Phosphatase Latest Ref Range: 40 - 150 U/L 158 (H)     ALT Latest Ref Range: 0 - 50 U/L 18     AST Latest Ref Range: 0 - 45 U/L 19     Hemoglobin A1C Latest Ref Range: 4.3 - 6.0 % 6.4 (H)     Cholesterol Latest Ref Range: <200 mg/dL 213 (H)     Creatinine Urine Latest Units: mg/dL   31   Ferritin Latest Ref Range: 8 - 252 ng/mL  127    HDL Cholesterol Latest Ref Range: >49 mg/dL 75     Iron Latest Ref Range: 35 - 180 ug/dL  73    Iron Binding Cap Latest Ref Range: 240 - 430 ug/dL  170 (L)    Iron Saturation Index Latest Ref Range: 15 - 46 %  43    LDL Cholesterol Calculated Latest Ref Range: <100 mg/dL 81     Non HDL Cholesterol Latest Ref Range: <130 mg/dL 138 (H)     Protein Random Urine Latest Units: g/L   4.36   Protein Total Urine g/gr Creatinine Latest Ref Range: 0 - 0.2 g/g Cr   14.11 (H)   T4 Free Latest Ref Range: 0.76 - 1.46 ng/dL 0.48 (L)     Triglycerides  Latest Ref Range: <150 mg/dL 283 (H)     TSH Latest Ref Range: 0.40 - 4.00 mU/L 71.77 (H)     Vitamin D Deficiency screening Latest Ref Range: 20 - 75 ug/L  15 (L)    Glucose Latest Ref Range: 70 - 99 mg/dL 158 (H) 100 (H)    WBC Latest Ref Range: 4.0 - 11.0 10e9/L 6.9     Hemoglobin Latest Ref Range: 11.7 - 15.7 g/dL 12.8     Hematocrit Latest Ref Range: 35.0 - 47.0 % 41.0     Platelet Count Latest Ref Range: 150 - 450 10e9/L 279     RBC Count Latest Ref Range: 3.8 - 5.2 10e12/L 4.43     MCV Latest Ref Range: 78 - 100 fl 93     MCH Latest Ref Range: 26.5 - 33.0 pg 28.9     MCHC Latest Ref Range: 31.5 - 36.5 g/dL 31.2 (L)     RDW Latest Ref Range: 10.0 - 15.0 % 15.6 (H)     Parathyroid Hormone Intact Latest Ref Range: 12 - 72 pg/mL  363 (H)

## 2017-09-19 NOTE — TELEPHONE ENCOUNTER
Staff faxed completed and signed Insulin-Treated Diabetes Mellitus Report to [employer] at [phone].      Kae Jacobs MA

## 2017-09-19 NOTE — TELEPHONE ENCOUNTER
Reason for Call:  Form, our goal is to have forms completed with 72 hours, however, some forms may require a visit or additional information.    Type of letter, form or note:  medical - Insulin-Treated Diabetes Mellitus Report     Who is the form from?:  [employer] (if other please explain) [place] [employer]    Where did the form come from: Patient or family brought in       What clinic location was the form placed at[facility]    Where the form was placed: 's Box    What number is listed as a contact on the form?: Fax form when completed to [phone]       Additional comments: Patient would like a call when the form has been completed and faxed.     Call taken on 9/19/2017 at 8:35 AM by Cady Estevez

## 2017-09-22 ENCOUNTER — TELEPHONE (OUTPATIENT)
Dept: FAMILY MEDICINE | Facility: CLINIC | Age: 72
End: 2017-09-22

## 2017-09-22 NOTE — TELEPHONE ENCOUNTER
Staff faxed completed and signed [place] [employer] [facility]-Insulin-treated Diabetes Mellitus Report to [phone].      Kae Jacobs MA

## 2017-09-25 ENCOUNTER — CARE COORDINATION (OUTPATIENT)
Dept: GERIATRIC MEDICINE | Facility: CLINIC | Age: 72
End: 2017-09-25

## 2017-09-25 NOTE — PROGRESS NOTES
Call placed to member to reschedule annual assessment.  Member canceled previously scheduled assessment, 9/20/17.   NO answer. Unable to leave voicemail.   Batool Mai RN, BSN, PHN  [facility]  [phone]  Fax: [phone]

## 2017-09-25 NOTE — TELEPHONE ENCOUNTER
"Intake Progress Note  Nurse Call: 10/10  Save the Date: 10/25    Organ:  kidney  Referral Came Via (Fax from/phone call from):  Seen in clinic    Referring Physician: Eris Villela    Assigned Coordinator:  Carlota Beckwith    Reported Diagnosis that caused the kidney failure ( not CKD)  Previous donor in 1988 then later developed DMll    Best time patient can be reached:  mornings    How would you like to be communicated with, through MyChart, phone, or mail? phone      Records:  Viroblock requested from: All records are available in [facility] as all providers are  so no outside records have been ordered       Insurance information:  Current in EPIC      History of diabetes:  Yes  Type 2    If yes, age of onset:  65    Do you have an endocrinologist?: no         On insulin or oral medication:  Yes           What type of insulin and how many units? 10u Lantus daily          History of a kidney biopsy:  No    Past Medical and Surgical History (updated in Epic medical / surgical):             History of  cancer personally:  No,                             History of cardiac events:  No but has HTN               History abdominal surgeries other than previous transplant: Yes What type? Nephrectomy 1988, tubal ligation, gall bladder and appendix, hysterectomy              Where and when? Other all surgeries were 30 or more years ago              History of previous transplant: No            Listed or in eval at another transplant center?  No             History of hospitalization in last 12 months:  No                History of blood transfusion:  No               Smoking history:  Yes     Current smoker:  Yes  How much? 10 ciggarettes a day       On dialysis: No    If NYOD, estimated GFR:  17  Height:  65\"  Weight:  188lbs  BMI:  30    Health Maintenance:  PAP:  Year ago, [facility]  Mammo:  Year ago, [facility]  Colon:  never  Dental: dentures  Vaccines up to date  Special Needs (ie wheelchair, assistance, guardian, interpretor):  walker    As " for the next steps, as long as we are able to get financial approval and receive your medical records, you should be expecting a call from your Transplant Coordinator in about 2 weeks. Your Transplant Coordinator will go into more detail about the evaluation process, but we can put a hold on a tentative date for your transplant evaluation now. Kidney (K/P) evaluations are scheduled for Monday, Wednesday, or Thursdays, and can start as early as 6:30 am and end as late as 4:30pm. Would one of these days work better for you? Great, I am going to put a hold on Day/Month for you. Again, this appointment day and time is subject to change and is dependent on financial approval from your insurance company and our receiving your medical records so we are able to meet your individual needs.    I also want to schedule your first call with the coordinator. (Offer a couple choices and schedule patient's preferred date and time.)      Inform patient on the need to arrange age appropriate cancer screening, vaccines up to date and dental clearance    Reviewed evaluation process and reminded patient to complete questionnaire, complete medical records release, and review packet prior to evaluation visit     Informed patient that coordinator will review their chart and insurance coverage and if no concerns they will receive a call from a  to schedule evaluation

## 2017-09-25 NOTE — PROGRESS NOTES
Arranged transportation thru [facility] for the below appt:  Appt Date & Time: 9/28 @ 12:30pm  Clinic Name & Address:  [address], [place]   Transportation Provider: [employer]   time:  11:30 - 12:00pm    Notified member of  time.    Arranged transportation thru [facility] for the below appt:  Appt Date & Time: 9/29 @  10:30am  Clinic Name & Address:  [address] [place] [address]   Transportation Provider: [employer]   time:  9:30 - 10:00am    Notified member of  time.      Justyna Mccain  Case Management Specialist  [facility]  [phone]

## 2017-09-25 NOTE — PROGRESS NOTES
Return call from member who states she is not interested in a home visit at this time.  Will proceed with refusal.   Batool Mai RN, BSN, PHN  [facility]  [phone]  Fax: [phone]

## 2017-09-26 ENCOUNTER — CARE COORDINATION (OUTPATIENT)
Dept: GERIATRIC MEDICINE | Facility: CLINIC | Age: 72
End: 2017-09-26

## 2017-09-26 NOTE — PROGRESS NOTES
"Per CC, mailed client a \"Refusal of Home Visit\" letter.  MMIS entry.      Brianna Barrios  Case Management Specialist  [facility]   [phone]    "

## 2017-09-27 DIAGNOSIS — N18.4 CKD (CHRONIC KIDNEY DISEASE) STAGE 4, GFR 15-29 ML/MIN (H): Primary | ICD-10-CM

## 2017-09-28 ENCOUNTER — OFFICE VISIT (OUTPATIENT)
Dept: PULMONOLOGY | Facility: CLINIC | Age: 72
End: 2017-09-28
Attending: INTERNAL MEDICINE
Payer: MEDICARE

## 2017-09-28 VITALS
TEMPERATURE: 98.3 F | HEIGHT: 65 IN | DIASTOLIC BLOOD PRESSURE: 73 MMHG | SYSTOLIC BLOOD PRESSURE: 167 MMHG | RESPIRATION RATE: 18 BRPM | OXYGEN SATURATION: 96 % | BODY MASS INDEX: 31.06 KG/M2 | WEIGHT: 186.4 LBS | HEART RATE: 61 BPM

## 2017-09-28 DIAGNOSIS — J44.9 CHRONIC OBSTRUCTIVE PULMONARY DISEASE, UNSPECIFIED COPD TYPE (H): ICD-10-CM

## 2017-09-28 DIAGNOSIS — J43.9 PULMONARY EMPHYSEMA, UNSPECIFIED EMPHYSEMA TYPE (H): Primary | ICD-10-CM

## 2017-09-28 DIAGNOSIS — Z00.00 HEALTHCARE MAINTENANCE: ICD-10-CM

## 2017-09-28 DIAGNOSIS — J44.9 COPD (CHRONIC OBSTRUCTIVE PULMONARY DISEASE) (H): ICD-10-CM

## 2017-09-28 DIAGNOSIS — R06.02 SOB (SHORTNESS OF BREATH): ICD-10-CM

## 2017-09-28 DIAGNOSIS — Z23 ENCOUNTER FOR IMMUNIZATION: ICD-10-CM

## 2017-09-28 DIAGNOSIS — N18.9 CHRONIC KIDNEY DISEASE, UNSPECIFIED CKD STAGE: ICD-10-CM

## 2017-09-28 DIAGNOSIS — E66.9 OBESITY (BMI 30-39.9): ICD-10-CM

## 2017-09-28 PROCEDURE — 90662 IIV NO PRSV INCREASED AG IM: CPT | Mod: ZF | Performed by: INTERNAL MEDICINE

## 2017-09-28 PROCEDURE — 99212 OFFICE O/P EST SF 10 MIN: CPT | Mod: ZF

## 2017-09-28 PROCEDURE — G0008 ADMIN INFLUENZA VIRUS VAC: HCPCS | Mod: ZF

## 2017-09-28 PROCEDURE — 25000128 H RX IP 250 OP 636: Mod: ZF | Performed by: INTERNAL MEDICINE

## 2017-09-28 RX ORDER — ALBUTEROL SULFATE 90 UG/1
2 AEROSOL, METERED RESPIRATORY (INHALATION) EVERY 6 HOURS PRN
Qty: 18 G | Refills: 3 | Status: SHIPPED | OUTPATIENT
Start: 2017-09-28 | End: 2018-08-16

## 2017-09-28 RX ORDER — TIOTROPIUM BROMIDE 18 UG/1
CAPSULE ORAL; RESPIRATORY (INHALATION)
Qty: 90 CAPSULE | Refills: 3 | Status: SHIPPED | OUTPATIENT
Start: 2017-09-28 | End: 2017-12-15

## 2017-09-28 RX ORDER — BUDESONIDE AND FORMOTEROL FUMARATE DIHYDRATE 80; 4.5 UG/1; UG/1
2 AEROSOL RESPIRATORY (INHALATION) 2 TIMES DAILY
Qty: 1 INHALER | Refills: 1 | Status: SHIPPED | OUTPATIENT
Start: 2017-09-28 | End: 2017-10-03

## 2017-09-28 RX ADMIN — INFLUENZA A VIRUSA/MICHIGAN/45/2015 X-275 (H1N1) ANTIGEN (FORMALDEHYDE INACTIVATED), INFLUENZA A VIRUS A/HONG KONG/4801/2014 X-263B (H3N2) ANTIGEN (FORMALDEHYDE INACTIVATED), AND INFLUENZA B VIRUS B/BRISBANE/60/2008 ANTIGEN (FORMALDEHYDE INACTIVATED) 0.5 ML: 60; 60; 60 INJECTION, SUSPENSION INTRAMUSCULAR at 13:46

## 2017-09-28 ASSESSMENT — PAIN SCALES - GENERAL: PAINLEVEL: NO PAIN (0)

## 2017-09-28 NOTE — NURSING NOTE
RN was contacted by Dr. Packer regarding smoking cessation. RN advised regarding quit plan and chantix. MD stated pt didn't think insurance would cover chantix but would be willing to write for it. RN left message for patient to return call to review plan regarding quitting smoking and resources available.
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
## 2017-09-28 NOTE — PATIENT INSTRUCTIONS
How to Quit Smoking  Smoking is one of the hardest habits to break. About half of all people who have ever smoked have been able to quit. Most people who still smoke want to quit. Here are some of the best ways to stop smoking.    Keep trying  Most smokers make many attempts at quitting before they are successful. It s important not to give up.  Go cold turkey  Most former smokers quit cold turkey (all at once). Trying to cut back gradually doesn't seem to work as well, perhaps because it continues the smoking habit. Also, it is possible to inhale more while smoking fewer cigarettes. This results in the same amount of nicotine in your body.  Get support  Support programs can be a big help, especially for heavy smokers. These groups offer lectures, ways to change behavior, and peer support. Here are some ways to find a support program:    Free national quitline: 800-QUIT-NOW (261-596-1184).    [facility] quit-smoking programs.    American Lung Association: (542.125.7584).    American Cancer Society (829-794-2106).  Support at home is important too. Nonsmokers can offer praise and encouragement. If the smoker in your life finds it hard to quit, encourage them to keep trying.  Over-the-counter medicines  Nicotine replacement therapy may make quitting easier. Certain aids, such as the nicotine patch, gum, and lozenges, are available without a prescription. It is best to use these under a doctor s care, though. The skin patch provides a steady supply of nicotine. Nicotine gum and lozenges give temporary bursts of low levels of nicotine. Both methods reduce the craving for cigarettes. Warning: If you have nausea, vomiting, dizziness, weakness, or a fast heartbeat, stop using these products and see your doctor.  Prescription medicines  After reviewing your smoking patterns and past attempts to quit, your doctor may offer a prescription medicine such as bupropion, varenicline, a nicotine inhaler, or nasal spray. Each has  "advantages and side effects. Your doctor can review these with you.  Health benefits of quitting  The benefits of quitting start right away and keep improving the longer you go without smoking. These benefits occur at any age.  So whether you are 17 or 70, quitting is a good decision. Some of the benefits include:    20 minutes: Blood pressure and pulse return to normal.    8 hours: Oxygen levels return to normal.    2 days: Ability to smell and taste begin to improve as damaged nerves regrow.    2 to 3 weeks: Circulation and lung function improve.    1 to 9 months: Coughing, congestion, and shortness of breath decrease; tiredness decreases.    1 year: Risk of heart attack decreases by half.    5 years: Risk of lung cancer decreases by half; risk of stroke becomes the same as a nonsmoker s.  For more on how to quit smoking, try these online resources:     Smokefree.gov    \"Clearing the Air\" booklet from the National [employer] [place]: smokefree.gov/sites/default/files/pdf/clearing-the-air-accessible.pdf  Date Last Reviewed: 3/1/2017    5976-4476 [employer]. [address] [place] [address]. All rights reserved. This information is not intended as a substitute for professional medical care. Always follow your healthcare professional's instructions.        "

## 2017-09-28 NOTE — MR AVS SNAPSHOT
After Visit Summary   9/28/2017    Kim Anne    MRN: [id number]           Patient Information     Date Of Birth          1945        Visit Information        Provider Department      9/28/2017 1:00 PM Margret Packer MD M [facility]        Today's Diagnoses     Pulmonary emphysema, unspecified emphysema type (H)    -  1    Chronic obstructive pulmonary disease, unspecified COPD type (H)        SOB (shortness of breath)        Chronic kidney disease, unspecified CKD stage        Encounter for immunization        Healthcare maintenance        Obesity (BMI 30-39.9)          Care Instructions      How to Quit Smoking  Smoking is one of the hardest habits to break. About half of all people who have ever smoked have been able to quit. Most people who still smoke want to quit. Here are some of the best ways to stop smoking.    Keep trying  Most smokers make many attempts at quitting before they are successful. It s important not to give up.  Go cold turkey  Most former smokers quit cold turkey (all at once). Trying to cut back gradually doesn't seem to work as well, perhaps because it continues the smoking habit. Also, it is possible to inhale more while smoking fewer cigarettes. This results in the same amount of nicotine in your body.  Get support  Support programs can be a big help, especially for heavy smokers. These groups offer lectures, ways to change behavior, and peer support. Here are some ways to find a support program:    Free national quitline: 800-QUIT-NOW (581-911-3784).    [facility] quit-smoking programs.    American Lung Association: (817.861.1473).    American Cancer Society (300-855-5722).  Support at home is important too. Nonsmokers can offer praise and encouragement. If the smoker in your life finds it hard to quit, encourage them to keep trying.  Over-the-counter medicines  Nicotine replacement therapy may make quitting easier. Certain  "aids, such as the nicotine patch, gum, and lozenges, are available without a prescription. It is best to use these under a doctor s care, though. The skin patch provides a steady supply of nicotine. Nicotine gum and lozenges give temporary bursts of low levels of nicotine. Both methods reduce the craving for cigarettes. Warning: If you have nausea, vomiting, dizziness, weakness, or a fast heartbeat, stop using these products and see your doctor.  Prescription medicines  After reviewing your smoking patterns and past attempts to quit, your doctor may offer a prescription medicine such as bupropion, varenicline, a nicotine inhaler, or nasal spray. Each has advantages and side effects. Your doctor can review these with you.  Health benefits of quitting  The benefits of quitting start right away and keep improving the longer you go without smoking. These benefits occur at any age.  So whether you are 17 or 70, quitting is a good decision. Some of the benefits include:    20 minutes: Blood pressure and pulse return to normal.    8 hours: Oxygen levels return to normal.    2 days: Ability to smell and taste begin to improve as damaged nerves regrow.    2 to 3 weeks: Circulation and lung function improve.    1 to 9 months: Coughing, congestion, and shortness of breath decrease; tiredness decreases.    1 year: Risk of heart attack decreases by half.    5 years: Risk of lung cancer decreases by half; risk of stroke becomes the same as a nonsmoker s.  For more on how to quit smoking, try these online resources:     Smokefree.gov    \"Clearing the Air\" booklet from the National Cancer [place]: smokefree.gov/sites/default/files/pdf/clearing-the-air-accessible.pdf  Date Last Reviewed: 3/1/2017    0187-4445 The [employer]. [address] [place] [address], [place] [address]. All rights reserved. This information is not intended as a substitute for professional medical care. Always follow your healthcare professional's " instructions.                Follow-ups after your visit        Follow-up notes from your care team     Return in about 6 months (around 3/28/2018).      Your next 10 appointments already scheduled     Sep 29, 2017 10:30 AM CDT   [facility] JUNIOR Rodriguez CNP   [facility]    [address]  [address]  [facility] [address]   [phone]            Sep 29, 2017 10:30 AM CDT   Return Visit with LAMINE Chahal   [facility]    [address]  [address]  [facility] [address]   [phone]            Sep 29, 2017 10:30 AM CDT   Office Visit with Pratibha Barth   [facility] ([facility])    [address] [place] [address]  [address]  [facility] [address] [phone]           Bring a current list of meds and any records pertaining to this visit. For Physicals, please bring immunization records and any forms needing to be filled out. Please arrive 10 minutes early to complete paperwork.            Oct 13, 2017 10:30 AM CDT   (Arrive by 10:00 AM)   Return Visit with Wendy Espinal PA-C   [facility] ([facility])    [address] [place] [id number] [phone]            Mar 29, 2018  7:30 AM CDT   PFT VISIT with  PFL B   [facility] ([facility])    [address] [place] [id number] [phone]            Mar 29, 2018  8:00 AM CDT   (Arrive by 7:45 AM)   Return Visit with MD MATHEUS Rodriguez [facility] ([facility])    [address] [place] [id number] [phone]              Future tests that were ordered for you today     Open Future Orders   "      Priority Expected Expires Ordered    Spirometry, Breath Capacity:  Future Order, RT Performed Routine  2017    Renal panel Routine 10/13/2017 2017 2017    CBC with platelets Routine 10/13/2017 2017 2017    Protein  random urine with Creat Ratio Routine 10/13/2017 2017 2017            Who to contact     If you have questions or need follow up information about today's clinic visit or your schedule please contact [facility] directly at [phone].  Normal or non-critical lab and imaging results will be communicated to you by MonkeyFindhart, letter or phone within 4 business days after the clinic has received the results. If you do not hear from us within 7 days, please contact the clinic through WineDemont or phone. If you have a critical or abnormal lab result, we will notify you by phone as soon as possible.  Submit refill requests through YUPPTV or call your pharmacy and they will forward the refill request to us. Please allow 3 business days for your refill to be completed.          Additional Information About Your Visit        MyChart Information     YUPPTV lets you send messages to your doctor, view your test results, renew your prescriptions, schedule appointments and more. To sign up, go to [email or web address] . Click on \"Log in\" on the left side of the screen, which will take you to the Welcome page. Then click on \"Sign up Now\" on the right side of the page.     You will be asked to enter the access code listed below, as well as some personal information. Please follow the directions to create your username and password.     Your access code is: [email or web address]  Expires: 2017  9:37 AM     Your access code will  in 90 days. If you need help or a new code, please call your [place] clinic or [phone].        Care EveryWhere ID     This is your Care EveryWhere ID. This could be used by other organizations to " "access your [place] medical records  [id number]        Your Vitals Were     Pulse Temperature Respirations Height Pulse Oximetry BMI (Body Mass Index)    61 98.3  F (36.8  C) (Oral) 18 1.651 m (5' 5\") 96% 31.02 kg/m2       Blood Pressure from Last 3 Encounters:   09/28/17 167/73   09/08/17 180/70   09/01/17 124/82    Weight from Last 3 Encounters:   09/28/17 84.6 kg (186 lb 6.4 oz)   09/08/17 83.6 kg (184 lb 6.4 oz)   09/01/17 82.1 kg (181 lb)                 Today's Medication Changes          These changes are accurate as of: 9/28/17  1:46 PM.  If you have any questions, ask your nurse or doctor.               Start taking these medicines.        Dose/Directions    influenza Vac Split High-Dose 0.5 ML injection   Commonly known as:  FLUZONE   Used for:  Encounter for immunization, Healthcare maintenance   Started by:  Margret Packer MD        Dose:  0.5 mL   Inject 0.5 mLs into the muscle once for 1 dose   Quantity:  0.5 mL   Refills:  0       tiotropium 18 MCG capsule   Commonly known as:  SPIRIVA HANDIHALER   Used for:  Pulmonary emphysema, unspecified emphysema type (H)   Started by:  Margret Packer MD        Inhale contents of one capsule daily.   Quantity:  90 capsule   Refills:  3            Where to get your medicines      These medications were sent to [facility] [address] [facility] [address]  [address] [facility] [address], [facility] [address]     Phone:  [phone]     albuterol 108 (90 BASE) MCG/ACT Inhaler    budesonide-formoterol 80-4.5 MCG/ACT Inhaler    influenza Vac Split High-Dose 0.5 ML injection    tiotropium 18 MCG capsule                Primary Care Provider Office Phone # Fax #    JUNIOR Rodriguez [phone]       [address]  [facility] [address]        Equal Access to Services     CECIL TOLLIVER AH: Stephany Olmstead, waaxda luqadagail jones waxay idiin hayaan adeeg kharash la'aan ah. " So [facility] [phone].    ATENCIÓN: Si saela florence, tiene a bailey disposición servicios gratuitos de asistencia lingüística. Khadijah duran [phone].    We comply with applicable federal civil rights laws and [place] laws. We do not discriminate on the basis of race, color, national origin, age, disability sex, sexual orientation or gender identity.            Thank you!     Thank you for choosing [facility]  for your care. Our goal is always to provide you with excellent care. Hearing back from our patients is one way we can continue to improve our services. Please take a few minutes to complete the written survey that you may receive in the mail after your visit with us. Thank you!             Your Updated Medication List - Protect others around you: Learn how to safely use, store and throw away your medicines at www.disposemymeds.org.          This list is accurate as of: 9/28/17  1:46 PM.  Always use your most recent med list.                   Brand Name Dispense Instructions for use Diagnosis    albuterol 108 (90 BASE) MCG/ACT Inhaler    PROAIR HFA/PROVENTIL HFA/VENTOLIN HFA    18 g    Inhale 2 puffs into the lungs every 6 hours as needed for shortness of breath / dyspnea or wheezing    Chronic obstructive pulmonary disease, unspecified COPD type (H)       ASPIRIN LOW DOSE 81 MG EC tablet   Generic drug:  aspirin     30 tablet    Take 1 tablet (81 mg) by mouth daily    History of MI (myocardial infarction), Essential hypertension, benign       atorvastatin 40 MG tablet    LIPITOR    90 tablet    Take 1 tablet (40 mg) by mouth daily    Hyperlipidemia LDL goal <100       blood glucose monitoring lancets     1 Box    Test BS four times daily as directed    Type 2 diabetes mellitus without complication, with long-term current use of insulin (H)       blood glucose monitoring test strip    FREESTYLE LITE    50 strip    TEST BLOOD SUGAR TWO TIMES A DAY    Type 2 diabetes mellitus  without complication, with long-term current use of insulin (H)       budesonide-formoterol 80-4.5 MCG/ACT Inhaler    SYMBICORT    1 Inhaler    Inhale 2 puffs into the lungs 2 times daily    Chronic obstructive pulmonary disease, unspecified COPD type (H)       docusate sodium 100 MG capsule    COLACE    60 capsule    Take 1 capsule (100 mg) by mouth 2 times daily    Constipation, unspecified constipation type       EUCERIN CALMING DAILY MOIST Crea     1 Tube    Externally apply 1 dose * topically daily    DM neuro manif type II       ferrous sulfate 325 (65 FE) MG tablet    IRON    100 tablet    Take 1 tablet (325 mg) by mouth daily (with breakfast)    Iron deficiency anemia, unspecified       furosemide 40 MG tablet    LASIX    30 tablet    Take 1 tablet (40 mg) by mouth daily    Hyperkalemia       glucose-vitamin C 4-0.006 G Chew    DEX4 GLUCOSE    50 tablet    Take 1 tablet (4 g) by mouth every hour as needed for low blood sugar    Controlled type 2 diabetes mellitus with stage 4 chronic kidney disease, with long-term current use of insulin (H)       hydrocortisone 1 % ointment     30 g    Apply sparingly to affected area three times daily for 14 days.    Rash       influenza Vac Split High-Dose 0.5 ML injection    FLUZONE    0.5 mL    Inject 0.5 mLs into the muscle once for 1 dose    Encounter for immunization, Healthcare maintenance       insulin glargine 100 UNIT/ML injection    LANTUS    15 mL    Inject 10 Units Subcutaneous every morning    Controlled type 2 diabetes mellitus with stage 4 chronic kidney disease, with long-term current use of insulin (H)       insulin pen needle 31G X 6 MM     120 each    Use as directed    Type 2 diabetes mellitus without complication (H)       levothyroxine 200 MCG tablet    SYNTHROID/LEVOTHROID    90 tablet    Take 1 tablet (200 mcg) by mouth See Admin Instructions New daily increase. Recheck labs in 8 weeks.    Other specified hypothyroidism       losartan 100 MG tablet     COZAAR    90 tablet    Take 1 tablet (100 mg) by mouth daily    Hypertension associated with diabetes (H)       metoprolol 25 MG 24 hr tablet    TOPROL-XL    90 tablet    Take 1 tablet (25 mg) by mouth daily    Hypertension associated with diabetes (H)       nitroGLYcerin 0.4 MG sublingual tablet    NITROSTAT    25 tablet    Place 1 tablet (0.4 mg) under the tongue every 5 minutes as needed for chest pain    History of MI (myocardial infarction)       nystatin 875854 UNIT/GM Powd    MYCOSTATIN    30 g    Apply 1 g topically 3 times daily as needed    Groin rash       * order for DME     1 Device    One wheeled walker with seat and brakes and basket    Risk for falls       * order for DME     2 Device    Equipment being ordered: two pairs moderate knee high support hose    Edema, unspecified type       oxyCODONE 10 MG IR tablet    ROXICODONE    90 tablet    Take 1 tablet (10 mg) by mouth every 8 hours as needed    Chronic low back pain without sciatica, unspecified back pain laterality       polyethylene glycol powder    MIRALAX    510 g    Take 17 g (1 capful) by mouth daily    Slow transit constipation       SM ALCOHOL PREP 70 % Pads     100 each    Externally apply 1 pad topically 4 times daily    Type 2 diabetes mellitus without complication, with long-term current use of insulin (H)       sodium bicarbonate 325 MG tablet     180 tablet    Take 2 tablets (650 mg) by mouth 3 times daily    Acidosis, CKD (chronic kidney disease) stage 4, GFR 15-29 ml/min (H)       tiotropium 18 MCG capsule    SPIRIVA HANDIHALER    90 capsule    Inhale contents of one capsule daily.    Pulmonary emphysema, unspecified emphysema type (H)       varenicline 0.5 MG X 11 & 1 MG X 42 tablet    CHANTIX STARTING MONTH PAK    53 tablet    Take 0.5 mg tab daily for 3 days, then 0.5 mg tab twice daily for 4 days, then 1 mg twice daily.    Encounter for smoking cessation counseling       vitamin D 2000 UNITS tablet     60 tablet    Take  2,000 Units by mouth daily    Vitamin D deficiency disease       * Notice:  This list has 2 medication(s) that are the same as other medications prescribed for you. Read the directions carefully, and ask your doctor or other care provider to review them with you.

## 2017-09-28 NOTE — NURSING NOTE
Chief Complaint   Patient presents with     COPD     Follow up on Kim and her COPD     Bhargav Villegas CMA at 12:59 PM on 9/28/2017

## 2017-09-29 ENCOUNTER — CARE COORDINATION (OUTPATIENT)
Dept: CARE COORDINATION | Facility: CLINIC | Age: 72
End: 2017-09-29

## 2017-09-29 ENCOUNTER — OFFICE VISIT (OUTPATIENT)
Dept: BEHAVIORAL HEALTH | Facility: CLINIC | Age: 72
End: 2017-09-29
Payer: MEDICARE

## 2017-09-29 ENCOUNTER — OFFICE VISIT (OUTPATIENT)
Dept: PHARMACY | Facility: CLINIC | Age: 72
End: 2017-09-29
Payer: COMMERCIAL

## 2017-09-29 DIAGNOSIS — J44.9 CHRONIC OBSTRUCTIVE PULMONARY DISEASE, UNSPECIFIED COPD TYPE (H): ICD-10-CM

## 2017-09-29 DIAGNOSIS — E11.22 CONTROLLED TYPE 2 DIABETES MELLITUS WITH STAGE 4 CHRONIC KIDNEY DISEASE, WITH LONG-TERM CURRENT USE OF INSULIN (H): Primary | ICD-10-CM

## 2017-09-29 DIAGNOSIS — N18.4 CONTROLLED TYPE 2 DIABETES MELLITUS WITH STAGE 4 CHRONIC KIDNEY DISEASE, WITH LONG-TERM CURRENT USE OF INSULIN (H): Primary | ICD-10-CM

## 2017-09-29 DIAGNOSIS — E55.9 VITAMIN D DEFICIENCY: ICD-10-CM

## 2017-09-29 DIAGNOSIS — Z71.6 ENCOUNTER FOR SMOKING CESSATION COUNSELING: ICD-10-CM

## 2017-09-29 DIAGNOSIS — F43.21 GRIEF: Primary | ICD-10-CM

## 2017-09-29 DIAGNOSIS — Z79.4 CONTROLLED TYPE 2 DIABETES MELLITUS WITH STAGE 4 CHRONIC KIDNEY DISEASE, WITH LONG-TERM CURRENT USE OF INSULIN (H): Primary | ICD-10-CM

## 2017-09-29 DIAGNOSIS — E03.8 OTHER SPECIFIED HYPOTHYROIDISM: ICD-10-CM

## 2017-09-29 PROCEDURE — 99606 MTMS BY PHARM EST 15 MIN: CPT | Mod: GY | Performed by: PHARMACIST

## 2017-09-29 PROCEDURE — 99607 MTMS BY PHARM ADDL 15 MIN: CPT | Mod: GY | Performed by: PHARMACIST

## 2017-09-29 PROCEDURE — 90834 PSYTX W PT 45 MINUTES: CPT | Performed by: SOCIAL WORKER

## 2017-09-29 NOTE — MR AVS SNAPSHOT
After Visit Summary   9/29/2017    Kim Anne    MRN: [id number]           Patient Information     Date Of Birth          1945        Visit Information        Provider Department      9/29/2017 10:30 AM Pratibha Barth [facility] Integrated Primary Care MTM        Care Instructions    Recommendations from today's MTM visit:                                                      1. Increase your vitamin D to 3,000 units daily.     2. Take your iron with dinner, NOT with breakfast.     3. Ailyn faxed in the form for the 's office. 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     4. Keep up the great work with quitting smoking!!!     5. Try to limit foods that have potassium in them. Have your granddaughter help you look up which foods have a lot of potassium in them either on [email or web address] or the [employer] zeinab.     Next MTM visit: 2 weeks    To schedule another MTM appointment, please call the clinic directly or you may call the MTM scheduling line at [phone] or toll-free at [phone].     My Clinical Pharmacist's contact information:                                                      It was a pleasure seeing you today!  Please feel free to contact me with any questions or concerns you have.      Pratibha Barth, PharmD  Medication Therapy Management Pharmacist  Pager: [phone]    You may receive a survey about the MTM services you received.  I would appreciate your feedback to help me serve you better in the future. Please fill it out and return it when you can. Your comments will be anonymous.                  Follow-ups after your visit        Your next 10 appointments already scheduled     Oct 13, 2017 10:30 AM CDT   (Arrive by 10:00 AM)   Return Visit with JANE Montiel [facility] Nephrology ([facility] [place])    [address] [facility]  3rd [facility] [address] [phone]            Mar 29, 2018  7:30  "AM CDT   PFT VISIT with  PFL TAMMI   [facility] Pulmonary Function Testing ([facility])    [address] [id number] [phone]            Mar 29, 2018  8:00 AM CDT   (Arrive by 7:45 AM)   Return Visit with Margret Packer MD   [facility] ([facility])    [address] [id number] [phone]              Future tests that were ordered for you today     Open Future Orders        Priority Expected Expires Ordered    Spirometry, Breath Capacity:  Future Order, RT Performed Routine  11/12/2017 9/28/2017            Who to contact     If you have questions or need follow up information about today's clinic visit or your schedule please contact [facility] directly at [phone].  Normal or non-critical lab and imaging results will be communicated to you by MyChart, letter or phone within 4 business days after the clinic has received the results. If you do not hear from us within 7 days, please contact the clinic through Planbox[employer] or phone. If you have a critical or abnormal lab result, we will notify you by phone as soon as possible.  Submit refill requests through [employer] or call your pharmacy and they will forward the refill request to us. Please allow 3 business days for your refill to be completed.          Additional Information About Your Visit        Planboxhart Information     [employer] lets you send messages to your doctor, view your test results, renew your prescriptions, schedule appointments and more. To sign up, go to [email or web address] . Click on \"Log in\" on the left side of the screen, which will take you to the Welcome page. Then click on \"Sign up Now\" on the right side of the page.     You will be asked to enter the access code listed below, as well as some personal information. Please follow the directions to create " your username and password.     Your access code is: [email or web address]  Expires: 2017  9:37 AM     Your access code will  in 90 days. If you need help or a new code, please call your [place] clinic or [phone].        Care EveryWhere ID     This is your Care EveryWhere ID. This could be used by other organizations to access your [place] medical records  [id number]         Blood Pressure from Last 3 Encounters:   17 167/73   17 180/70   17 124/82    Weight from Last 3 Encounters:   17 186 lb 6.4 oz (84.6 kg)   17 184 lb 6.4 oz (83.6 kg)   17 181 lb (82.1 kg)              Today, you had the following     No orders found for display       Primary Care Provider Office Phone # Fax #    Ailyn Bernstein JUNIOR Figueroa [phone]       [address] [facility] [address] [place] [address]        Equal Access to Services     [facility]: Hadii aad ku hadasho Sofranciscaali, waaxda luqadaha, qaybta kaalmada stephanie, kassandra white . So [facility] [phone].    ATENCIÓN: Si habla español, tiene a bailey disposición servicios gratuitos de asistencia lingüística. Khadijah al [phone].    We comply with applicable federal civil rights laws and [place] laws. We do not discriminate on the basis of race, color, national origin, age, disability sex, sexual orientation or gender identity.            Thank you!     Thank you for choosing [facility] [place]  for your care. Our goal is always to provide you with excellent care. Hearing back from our patients is one way we can continue to improve our services. Please take a few minutes to complete the written survey that you may receive in the mail after your visit with us. Thank you!             Your Updated Medication List - Protect others around you: Learn how to safely use, store and throw away your medicines at www.disposemymeds.org.          This list is accurate as of: 17 10:45  AM.  Always use your most recent med list.                   Brand Name Dispense Instructions for use Diagnosis    albuterol 108 (90 BASE) MCG/ACT Inhaler    PROAIR HFA/PROVENTIL HFA/VENTOLIN HFA    18 g    Inhale 2 puffs into the lungs every 6 hours as needed for shortness of breath / dyspnea or wheezing    Chronic obstructive pulmonary disease, unspecified COPD type (H)       ASPIRIN LOW DOSE 81 MG EC tablet   Generic drug:  aspirin     30 tablet    Take 1 tablet (81 mg) by mouth daily    History of MI (myocardial infarction), Essential hypertension, benign       atorvastatin 40 MG tablet    LIPITOR    90 tablet    Take 1 tablet (40 mg) by mouth daily    Hyperlipidemia LDL goal <100       blood glucose monitoring lancets     1 Box    Test BS four times daily as directed    Type 2 diabetes mellitus without complication, with long-term current use of insulin (H)       blood glucose monitoring test strip    FREESTYLE LITE    50 strip    TEST BLOOD SUGAR TWO TIMES A DAY    Type 2 diabetes mellitus without complication, with long-term current use of insulin (H)       budesonide-formoterol 80-4.5 MCG/ACT Inhaler    SYMBICORT    1 Inhaler    Inhale 2 puffs into the lungs 2 times daily    Chronic obstructive pulmonary disease, unspecified COPD type (H)       docusate sodium 100 MG capsule    COLACE    60 capsule    Take 1 capsule (100 mg) by mouth 2 times daily    Constipation, unspecified constipation type       EUCERIN CALMING DAILY MOIST Crea     1 Tube    Externally apply 1 dose * topically daily    DM neuro manif type II       ferrous sulfate 325 (65 FE) MG tablet    IRON    100 tablet    Take 1 tablet (325 mg) by mouth daily (with breakfast)    Iron deficiency anemia, unspecified       furosemide 40 MG tablet    LASIX    30 tablet    Take 1 tablet (40 mg) by mouth daily    Hyperkalemia       glucose-vitamin C 4-0.006 G Chew    DEX4 GLUCOSE    50 tablet    Take 1 tablet (4 g) by mouth every hour as needed for low  blood sugar    Controlled type 2 diabetes mellitus with stage 4 chronic kidney disease, with long-term current use of insulin (H)       hydrocortisone 1 % ointment     30 g    Apply sparingly to affected area three times daily for 14 days.    Rash       insulin glargine 100 UNIT/ML injection    LANTUS    15 mL    Inject 10 Units Subcutaneous every morning    Controlled type 2 diabetes mellitus with stage 4 chronic kidney disease, with long-term current use of insulin (H)       insulin pen needle 31G X 6 MM     120 each    Use as directed    Type 2 diabetes mellitus without complication (H)       levothyroxine 200 MCG tablet    SYNTHROID/LEVOTHROID    90 tablet    Take 1 tablet (200 mcg) by mouth See Admin Instructions New daily increase. Recheck labs in 8 weeks.    Other specified hypothyroidism       losartan 100 MG tablet    COZAAR    90 tablet    Take 1 tablet (100 mg) by mouth daily    Hypertension associated with diabetes (H)       metoprolol 25 MG 24 hr tablet    TOPROL-XL    90 tablet    Take 1 tablet (25 mg) by mouth daily    Hypertension associated with diabetes (H)       nitroGLYcerin 0.4 MG sublingual tablet    NITROSTAT    25 tablet    Place 1 tablet (0.4 mg) under the tongue every 5 minutes as needed for chest pain    History of MI (myocardial infarction)       nystatin 675294 UNIT/GM Powd    MYCOSTATIN    30 g    Apply 1 g topically 3 times daily as needed    Groin rash       * order for DME     1 Device    One wheeled walker with seat and brakes and basket    Risk for falls       * order for DME     2 Device    Equipment being ordered: two pairs moderate knee high support hose    Edema, unspecified type       oxyCODONE 10 MG IR tablet    ROXICODONE    90 tablet    Take 1 tablet (10 mg) by mouth every 8 hours as needed    Chronic low back pain without sciatica, unspecified back pain laterality       polyethylene glycol powder    MIRALAX    510 g    Take 17 g (1 capful) by mouth daily    Slow transit  constipation       SM ALCOHOL PREP 70 % Pads     100 each    Externally apply 1 pad topically 4 times daily    Type 2 diabetes mellitus without complication, with long-term current use of insulin (H)       sodium bicarbonate 325 MG tablet     180 tablet    Take 2 tablets (650 mg) by mouth 3 times daily    Acidosis, CKD (chronic kidney disease) stage 4, GFR 15-29 ml/min (H)       tiotropium 18 MCG capsule    SPIRIVA HANDIHALER    90 capsule    Inhale contents of one capsule daily.    Pulmonary emphysema, unspecified emphysema type (H)       varenicline 0.5 MG X 11 & 1 MG X 42 tablet    CHANTIX STARTING MONTH GRETEL    53 tablet    Take 0.5 mg tab daily for 3 days, then 0.5 mg tab twice daily for 4 days, then 1 mg twice daily.    Encounter for smoking cessation counseling       vitamin D 2000 UNITS tablet     60 tablet    Take 2,000 Units by mouth daily    Vitamin D deficiency disease       * Notice:  This list has 2 medication(s) that are the same as other medications prescribed for you. Read the directions carefully, and ask your doctor or other care provider to review them with you.

## 2017-09-29 NOTE — PROGRESS NOTES
Clinic Care Coordination Contact  Care Team Conversations    Patient came to clinic today for visit with MTM/ [facility]. SW discussed case with [facility] and reviewed chart. [facility] and [facility].     SW discussed with patient refusal of [facility]. Patient stated that she would feel more comfortable if patient could meet [facility] in [facility]. SW will discuss with [facility].     SARABJIT Xiong    Care Coordinator  [facility] Primary [facility], and Women's   [phone]

## 2017-09-29 NOTE — PROGRESS NOTES
SUBJECTIVE/OBJECTIVE:                                                    Kim Anne is a 71 year old female coming in for a follow-up visit for Medication Therapy Management.  She was referred to me from Mai Babcock. PCP is now Ailyn Figueroa.    Chief Complaint: HTN follow-up. Last MTM visit on 5/10/17.    Medication Adherence: history of med compliance issues - says that things have improved since she started using the pill boxes. Pt didn't take medications for about 1 week when she forgot them at an out-of-town relative's house. Had medications sent back to her and reports she is now taking them.    Diabetes:  Pt currently taking Lantus 10 units daily. Also has glucose tablets on-hand for hypoglycemia. Pt is experiencing the following side effects:  none  SMBG: two times daily.   Ranges (patient reported): 187, 171, 145, 317, 338, 123, 194, 186, 106, 166, 112, 135  Symptoms of low blood sugar? Shakiness, confusion. Frequency of hypoglycemia? Rare since dose decrease of the Lantus.   Recent symptoms of high blood sugar? none  Eye exam: up to date  Foot exam: up to date  Microalbumin is not < 30 mg/g. Pt is taking an ACEi/ARB. Losartan 100 mg daily.  Aspirin: Taking 81mg daily and denies side effects  Diet: Has been eating more candy because she is trying to quit smoking and eats candy when she is craving a cigarette. She was told to avoid produce due to her high potassium levels recently so she has been eating mostly oatmeal, farina, and potatoes.   Immunizations: Pt has had annual flu vaccine and both pneumococcal vaccines.    Vitamin D deficiency: Pt currently prescribed vitamin D 2,000IU daily. Unclear if she is taking this as prescribed. Vitamin D levels have been consistently low for the past few years.   Lab Results   Component Value Date    VITDT 15 (L) 09/11/2017    VITDT 14 (L) 10/11/2016    VITDT 16 (L) 08/29/2016    VITDT 19 (L) 12/09/2015    VITDT 16 (L) 06/05/2015     Hypothyroidism:  Patient is prescribed levothyroxine 200 mcg daily. Patient is having the following symptoms: hypothyroidism -  Depressed mood.     Smoking Cessation: Pt reports smoking 4-8 cigarettes per day. When she is craving a cigarette she tries to chew Red Hots candy or some gum. She was prescribed Chantix by the pulmonary doctor yesterday but states her insurance doesn't cover it so she has not picked it up.     COPD: Current medications: Albuterol MDI (uses three times daily) and Budesonide+Formoterol (Symbicort) 2 puffs BID. Saw pulmonology yesterday and they started Spiriva, however pt hasn't picked up the prescription yet. Pt rinses their mouth after using steroid inhaler.   Pt reports the following symptoms: none.    Current labs include:  BP Readings from Last 3 Encounters:   09/28/17 167/73   09/08/17 180/70   09/01/17 124/82     Lab Results   Component Value Date    A1C 6.4 09/07/2017   .  Lab Results   Component Value Date    CHOL 213 09/07/2017     Lab Results   Component Value Date    TRIG 283 09/07/2017     Lab Results   Component Value Date    HDL 75 09/07/2017     Lab Results   Component Value Date    LDL 81 09/07/2017       Liver Function Studies -   Recent Labs   Lab Test  09/07/17   1135   PROTTOTAL  5.7*   ALBUMIN  1.9*   BILITOTAL  0.2   ALKPHOS  158*   AST  19   ALT  18     Lab Results   Component Value Date    UCRR 31 09/11/2017    MICROL 5320 07/07/2017    UMALCR 6325.80 (H) 07/07/2017     Last Basic Metabolic Panel:  Lab Results   Component Value Date     09/18/2017      Lab Results   Component Value Date    POTASSIUM 4.3 09/18/2017     Lab Results   Component Value Date    CHLORIDE 114 09/18/2017     Lab Results   Component Value Date    BUN 34 09/18/2017     Lab Results   Component Value Date    CR 2.70 09/18/2017     GFR Estimate   Date Value Ref Range Status   09/18/2017 17 (L) >60 mL/min/1.7m2 Final     Comment:     Non  GFR Calc   09/11/2017 17 (L) >60 mL/min/1.7m2 Final      Comment:     Non  GFR Calc   09/07/2017 17 (L) >60 mL/min/1.7m2 Final     Comment:     Non  GFR Calc     GFR Estimate If Black   Date Value Ref Range Status   09/18/2017 21 (L) >60 mL/min/1.7m2 Final     Comment:      GFR Calc   09/11/2017 20 (L) >60 mL/min/1.7m2 Final     Comment:      GFR Calc   09/07/2017 21 (L) >60 mL/min/1.7m2 Final     Comment:      GFR Calc     TSH   Date Value Ref Range Status   09/07/2017 71.77 (H) 0.40 - 4.00 mU/L Final     Most Recent Immunizations   Administered Date(s) Administered     HepB 06/28/2012     Influenza (High Dose) 3 valent vaccine 09/28/2017     Influenza (IIV3) 11/04/2014     Pneumococcal (PCV 13) 01/05/2015     Pneumococcal 23 valent 04/01/2011     TD (ADULT, 7+) 07/12/2007     TDAP Vaccine (Adacel) 05/18/2011     Twinrix A/B 03/21/2012     Zoster vaccine, live 04/30/2012     ASSESSMENT:                                                    Current medications were reviewed with her today.      Medication Adherence: Improving with use of pill box.      Diabetes: Stable. Patient is meeting A1c goal of < 7%. Microalbumin is not at goal < 30 mg/g. Taking an ARB at max dose. Aspirin therapy is safe and appropriate. Recommend to continue current therapy. Advised patient to limit high potassium foods and helped her look up foods that are high in potassium such as bananas, broccoli, avocados, and potatoes.     Vitamin D Deficiency: Needs improvement. Pt's latest vitamin D level was less than the goal range of 30 mcg/L. Pt reports her mood has been decreased lately which could be due to vitamin D deficiency or low thyroid levels. Recommend to increase vitamin D supplementation to 3000 units by mouth daily.     Hypothyroid: Needs improvement. Pt's last TSH level was elevated at 71.77 and free T4 was low at 0.48. Pt states she takes all medications in the morning. Levothyroxine absorption could be  decreased when taken with iron. Pt's depressed mood could be due to low thyroid levels or low vitamin D levels as discussed above. Recommend pt take iron supplement with dinner to help improve levothyroxine absorption and increase thyroid levels.     Smoking Cessation: Improving. Pt uses candy and gum to curb tobacco cravings. Encouraged pt to continue decreasing tobacco use. If she is able to start using Chantix the recommended max dose in patients with a CrCl less than 30 mL/min is 0.5 mg by mouth twice daily (Pt's calculated CrCl is 20.2 mL/min).     COPD: Stable. No changes in breathing or lung function. Recommend to start Spiriva as discussed with pulmonology.    PLAN:                                                      1. Increase your vitamin D to 3,000 units daily.   2. Take your iron with dinner, NOT with breakfast.   3. Keep up the great work with quitting smoking!!!   4. Try to limit foods that have potassium in them. Have your granddaughter help you look up which foods have a lot of potassium in them either on [email or web address] or the [employer] zeinab.    I spent 30 minutes with this patient today.  All changes were made via collaborative practice agreement with Ailyn Figueroa. A copy of the visit note was provided to the patient's primary care provider.     Will follow up in 2 weeks.     The patient was given a summary of these recommendations as an after visit summary.    Pratibha Barth, PharmD  Medication Therapy Management Pharmacist  Pager: [phone]

## 2017-09-29 NOTE — PATIENT INSTRUCTIONS
Recommendations from today's MTM visit:                                                      1. Increase your vitamin D to 3,000 units daily.     2. Take your iron with dinner, NOT with breakfast.     3. Ailyn faxed in the form for the 's office. 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     4. Keep up the great work with quitting smoking!!!     5. Try to limit foods that have potassium in them. Have your granddaughter help you look up which foods have a lot of potassium in them either on [email or web address] or the [employer] zeinab.     Next MTM visit: 2 weeks    To schedule another MTM appointment, please call the clinic directly or you may call the MTM scheduling line at [phone] or toll-free at [phone].     My Clinical Pharmacist's contact information:                                                      It was a pleasure seeing you today!  Please feel free to contact me with any questions or concerns you have.      Pratibha Barth, PharmD  Medication Therapy Management Pharmacist  Pager: [phone]    You may receive a survey about the MTM services you received.  I would appreciate your feedback to help me serve you better in the future. Please fill it out and return it when you can. Your comments will be anonymous.

## 2017-09-29 NOTE — PROGRESS NOTES
[facility] - Integrated Primary Care   September 29, 2017      Behavioral Health Clinician Progress Note    Patient Name: Kim Anne           Service Type: Individual      Service Location:   Face to Face in Clinic     Session Start Time: 10:14am  Session End Time: 10:50am      Session Length: 38 - 52      Attendees: Patient and MTM and MTM student    Visit Activities (Refresh list every visit): [facility]    Diagnostic Assessment Date: TBD  Treatment Plan Review Date: TBD  See Flowsheets for today's PHQ-9 and JUSTINE-7 results  Previous PHQ-9:   PHQ-9 SCORE 12/2/2016 12/12/2016 2/14/2017   Total Score - - -   Total Score - - -   Total Score 0 0 1     Previous JUSTINE-7:   JUSTINE-7 SCORE 1/13/2016 12/2/2016 12/12/2016   Total Score - - -   Total Score 0 0 0   Total Score - - -       NAE LEVEL:  NAE Score (Last Two) 2/18/2013 5/23/2014   NAE Raw Score 48 45   Activation Score 80 73.1   NAE Level 4 4       DATA  Extended Session (60+ minutes): No  Interactive Complexity: No  Crisis: No    Treatment Objective(s) Addressed in This Session:  Target Behavior(s): disease management/lifestyle changes manage diabetes better    Grief / Loss: will process grief/loss issues in an adaptive manner  Psychological distress related to Diabetes    Current Stressors / Issues:  Pt reported that her mood has been stable. She stated that she has not noticed any highs or lows in terms of depression. Pt denied any anxiety as well. Writer asked pt if she would still like someone to come into her home and help her like a PCA or nurse. She stated that she refused the assessment that was supposed to happen at her home because she doesn't want anyone coming into her home. Pt and writer processed this and she stated that her son had a nurse come in after he was discharged from the hospital and she called CPS because their home needed repairs. Writer asked pt if she doesn't want someone to come into her house or her son doesn't want  "someone to come into her house. She stated that it was her son because CPS got called for her grandson who is also living there. Pt stated that they had a hole in their wall and it needed to be fixed and that is why CPS was called. Pt stated that if the person who comes to her home is  she would consider letting them in. Pt presented as confused and paranoid throughout the visit. She was very limited in terms of her responses and seemed as though she did not want to answer any questions. Pt stated that she also had a form she needed filled out. Writer informed her that the form was faxed in and she stated \"well what did it say? I needed to see that before it was faxed in\". Writer and MTM helped pt to gain information regarding the DMV and that she can just go there and they can help her on the next steps regarding her 's license.    Reviewed new and ongoing stressors  Reviewed mental health symptoms and appropriate coping mechanisms    I affirmed the steps this patient has taken to address physical and behavioral health issues, and offered continued behavioral health services or referral, now or in the future, as needed by the patient.    Progress on Treatment Objective(s) / Homework:  Satisfactory progress - CONTEMPLATION (Considering change and yet undecided); Intervened by assessing the negative and positive thinking (ambivalence) about behavior change    Motivational Interviewing    MI Intervention: Expressed Empathy/Understanding, Supported Autonomy, Collaboration, Evocation, Open-ended questions and Reflections: simple and complex     Change Talk Expressed by the Patient: Committment to change Activation Taking steps    Provider Response to Change Talk: E - Evoked more info from patient about behavior change, A - Affirmed patient's thoughts, decisions, or attempts at behavior change, R - Reflected patient's change talk and S - Summarized patient's change talk statements      Care Plan " review completed: No    Medication Review:  No changes to current psychiatric medication(s)    Medication Compliance:  Yes    Changes in Health Issues:   None reported    Chemical Use Review:   Substance Use: Chemical use reviewed, no active concerns identified      Tobacco Use: Yes, increase.  Client reports frequency of use 6 cigarettes daily. Reviewed information and resources for quitting  Reviewed options for assistance and intervention  Provided encouragement to quit     Assessment: Current Emotional / Mental Status (status of significant symptoms):  Risk status (Self / Other harm or suicidal ideation)  Patient denies a history of suicidal ideation, suicide attempts, self-injurious behavior, homicidal ideation, homicidal behavior and and other safety concerns  Patient denies current fears or concerns for personal safety.  Patient denies current or recent suicidal ideation or behaviors.  Patient denies current or recent homicidal ideation or behaviors.  Patient denies current or recent self injurious behavior or ideation.  Patient denies other safety concerns.  A safety and risk management plan has not been developed at this time, however patient was encouraged to call [place] [phone] should there be a change in any of these risk factors.    Appearance:   Appropriate   Eye Contact:   Fair   Psychomotor Behavior: Normal   Attitude:   Cooperative   Orientation:   All  Speech   Rate / Production: Impoverished  Monotone    Volume:  Normal   Mood:    Normal  Affect:    Blunted  Flat   Thought Content:  Clear   Thought Form:  Tangential   Insight:    Fair     Diagnoses:  1. Grief        Collateral Reports Completed:  Not Applicable    Plan: (Homework, other):  Patient was given information about behavioral services and encouraged to schedule a follow up appointment with the clinic [facility] as needed.  She was also given information about mental health symptoms and treatment options .  CD Recommendations: Maintain  Redd.  LAMINE Ndiaye, [facility]      ______________________________________________________________________    Integrated Primary Care Behavioral Health Treatment Plan    Patient's Name: Kim Whittington Dayana  YOB: 1945    Date: To be completed

## 2017-09-29 NOTE — MR AVS SNAPSHOT
After Visit Summary   9/29/2017    Kim Anne    MRN: [id number]           Patient Information     Date Of Birth          1945        Visit Information        Provider Department      9/29/2017 10:30 AM Aviva Lackey LICSW [facility]        Today's Diagnoses     Grief    -  1       Follow-ups after your visit        Your next 10 appointments already scheduled     Oct 03, 2017  9:30 AM CDT   Return Visit with LAMINE Chahal   [facility] ([facility])    [address] [place] [address]  [address] [place] [id number]   [phone]            Oct 03, 2017  9:30 AM CDT   Return Visit with JUNIOR Rodriguez CNP   [facility] ([facility])    [address] [place] [address]  [address] [place] [id number]   [phone]            Oct 04, 2017 11:00 AM CDT   Office Visit with Pratibha Barth   [facility] ([facility])    [address] [place] [address] [address] [place] [id number]   [phone]           Bring a current list of meds and any records pertaining to this visit. For Physicals, please bring immunization records and any forms needing to be filled out. Please arrive 10 minutes early to complete paperwork.            Oct 13, 2017 10:30 AM CDT   (Arrive by 10:00 AM)   Return Visit with Wendy Espinal PA-C   [facility]    [address] [place] [id number] [phone]            Mar 29, 2018  7:30 AM CDT   PFT VISIT with  PFL TAMMI   [facility] ([facility])    [address] [place] [id number] [phone]            Mar 29, 2018  8:00 AM CDT   (Arrive by 7:45 AM)   Return Visit with Margret Packer MD   [facility] ([facility])    [address] [facility]  [address] [facility] [address] [phone]              Future tests that were ordered for you today     Open Future Orders        Priority Expected Expires Ordered    Spirometry, Breath Capacity:  Future Order, RT Performed Routine  2017            Who to contact     If you have questions or need follow up information about today's clinic visit or your schedule please contact [facility] directly at [phone].  Normal or non-critical lab and imaging results will be communicated to you by [email or web address], letter or phone within 4 business days after the clinic has received the results. If you do not hear from us within 7 days, please contact the clinic through [employer] or phone. If you have a critical or abnormal lab result, we will notify you by phone as soon as possible.  Submit refill requests through [employer] or call your pharmacy and they will forward the refill request to us. Please allow 3 business days for your refill to be completed.          Additional Information About Your Visit        Farmeto Information     F[employer]eto lets you send messages to your doctor, view your test results, renew your prescriptions, schedule appointments and more. To sign up, go to [email or web address] . Click on \"Log in\" on the left side of the screen, which will take you to the Welcome page. Then click on \"Sign up Now\" on the right side of the page.     You will be asked to enter the access code listed below, as well as some personal information. Please follow the directions to create your username and password.     Your access code is: [email or web address]  Expires: 2017  9:37 AM     Your access code will  in 90 days. If you need help or a new code, please call your [facility] or [phone].        Care EveryWhere ID     This is your Care EveryWhere ID. This could be used " by other organizations to access your [place] medical records  [id number]         Blood Pressure from Last 3 Encounters:   09/28/17 167/73   09/08/17 180/70   09/01/17 124/82    Weight from Last 3 Encounters:   09/28/17 186 lb 6.4 oz (84.6 kg)   09/08/17 184 lb 6.4 oz (83.6 kg)   09/01/17 181 lb (82.1 kg)              Today, you had the following     No orders found for display       Primary Care Provider Office Phone # Fax #    JUNIOR Rodriguez [phone]       [address] [facility] [address]  [facility] [address]        Equal Access to Services     CECIL TOLLIVER : Hadii aad ku hadasho Soomaali, waaxda luqadaha, qaybta kaalmada adeegyada, waxay idiin hayanastasiia white . So [facility] [phone].    ATENCIÓN: Si habla español, tiene a bailey disposición servicios gratuitos de asistencia lingüística. Llame al [phone].    We comply with applicable federal civil rights laws and [place] laws. We do not discriminate on the basis of race, color, national origin, age, disability sex, sexual orientation or gender identity.            Thank you!     Thank you for choosing [facility]  for your care. Our goal is always to provide you with excellent care. Hearing back from our patients is one way we can continue to improve our services. Please take a few minutes to complete the written survey that you may receive in the mail after your visit with us. Thank you!             Your Updated Medication List - Protect others around you: Learn how to safely use, store and throw away your medicines at www.disposemymeds.org.          This list is accurate as of: 9/29/17 11:32 AM.  Always use your most recent med list.                   Brand Name Dispense Instructions for use Diagnosis    albuterol 108 (90 BASE) MCG/ACT Inhaler    PROAIR HFA/PROVENTIL HFA/VENTOLIN HFA    18 g    Inhale 2 puffs into the lungs every 6 hours as needed for shortness of breath / dyspnea or wheezing     Chronic obstructive pulmonary disease, unspecified COPD type (H)       ASPIRIN LOW DOSE 81 MG EC tablet   Generic drug:  aspirin     30 tablet    Take 1 tablet (81 mg) by mouth daily    History of MI (myocardial infarction), Essential hypertension, benign       atorvastatin 40 MG tablet    LIPITOR    90 tablet    Take 1 tablet (40 mg) by mouth daily    Hyperlipidemia LDL goal <100       blood glucose monitoring lancets     1 Box    Test BS four times daily as directed    Type 2 diabetes mellitus without complication, with long-term current use of insulin (H)       blood glucose monitoring test strip    FREESTYLE LITE    50 strip    TEST BLOOD SUGAR TWO TIMES A DAY    Type 2 diabetes mellitus without complication, with long-term current use of insulin (H)       budesonide-formoterol 80-4.5 MCG/ACT Inhaler    SYMBICORT    1 Inhaler    Inhale 2 puffs into the lungs 2 times daily    Chronic obstructive pulmonary disease, unspecified COPD type (H)       docusate sodium 100 MG capsule    COLACE    60 capsule    Take 1 capsule (100 mg) by mouth 2 times daily    Constipation, unspecified constipation type       EUCERIN CALMING DAILY MOIST Crea     1 Tube    Externally apply 1 dose * topically daily    DM neuro manif type II       ferrous sulfate 325 (65 FE) MG tablet    IRON    100 tablet    Take 1 tablet (325 mg) by mouth daily (with breakfast)    Iron deficiency anemia, unspecified       furosemide 40 MG tablet    LASIX    30 tablet    Take 1 tablet (40 mg) by mouth daily    Hyperkalemia       glucose-vitamin C 4-0.006 G Chew    DEX4 GLUCOSE    50 tablet    Take 1 tablet (4 g) by mouth every hour as needed for low blood sugar    Controlled type 2 diabetes mellitus with stage 4 chronic kidney disease, with long-term current use of insulin (H)       hydrocortisone 1 % ointment     30 g    Apply sparingly to affected area three times daily for 14 days.    Rash       insulin glargine 100 UNIT/ML injection    LANTUS    15  mL    Inject 10 Units Subcutaneous every morning    Controlled type 2 diabetes mellitus with stage 4 chronic kidney disease, with long-term current use of insulin (H)       insulin pen needle 31G X 6 MM     120 each    Use as directed    Type 2 diabetes mellitus without complication (H)       levothyroxine 200 MCG tablet    SYNTHROID/LEVOTHROID    90 tablet    Take 1 tablet (200 mcg) by mouth See Admin Instructions New daily increase. Recheck labs in 8 weeks.    Other specified hypothyroidism       losartan 100 MG tablet    COZAAR    90 tablet    Take 1 tablet (100 mg) by mouth daily    Hypertension associated with diabetes (H)       metoprolol 25 MG 24 hr tablet    TOPROL-XL    90 tablet    Take 1 tablet (25 mg) by mouth daily    Hypertension associated with diabetes (H)       nitroGLYcerin 0.4 MG sublingual tablet    NITROSTAT    25 tablet    Place 1 tablet (0.4 mg) under the tongue every 5 minutes as needed for chest pain    History of MI (myocardial infarction)       nystatin 452642 UNIT/GM Powd    MYCOSTATIN    30 g    Apply 1 g topically 3 times daily as needed    Groin rash       * order for DME     1 Device    One wheeled walker with seat and brakes and basket    Risk for falls       * order for DME     2 Device    Equipment being ordered: two pairs moderate knee high support hose    Edema, unspecified type       oxyCODONE 10 MG IR tablet    ROXICODONE    90 tablet    Take 1 tablet (10 mg) by mouth every 8 hours as needed    Chronic low back pain without sciatica, unspecified back pain laterality       polyethylene glycol powder    MIRALAX    510 g    Take 17 g (1 capful) by mouth daily    Slow transit constipation       SM ALCOHOL PREP 70 % Pads     100 each    Externally apply 1 pad topically 4 times daily    Type 2 diabetes mellitus without complication, with long-term current use of insulin (H)       sodium bicarbonate 325 MG tablet     180 tablet    Take 2 tablets (650 mg) by mouth 3 times daily     Acidosis, CKD (chronic kidney disease) stage 4, GFR 15-29 ml/min (H)       tiotropium 18 MCG capsule    SPIRIVA HANDIHALER    90 capsule    Inhale contents of one capsule daily.    Pulmonary emphysema, unspecified emphysema type (H)       varenicline 0.5 MG X 11 & 1 MG X 42 tablet    CHANTIX STARTING MONTH GRETEL    53 tablet    Take 0.5 mg tab daily for 3 days, then 0.5 mg tab twice daily for 4 days, then 1 mg twice daily.    Encounter for smoking cessation counseling       vitamin D 2000 UNITS tablet     60 tablet    Take 2,000 Units by mouth daily    Vitamin D deficiency disease       * Notice:  This list has 2 medication(s) that are the same as other medications prescribed for you. Read the directions carefully, and ask your doctor or other care provider to review them with you.

## 2017-10-02 ENCOUNTER — CARE COORDINATION (OUTPATIENT)
Dept: GERIATRIC MEDICINE | Facility: CLINIC | Age: 72
End: 2017-10-02

## 2017-10-02 NOTE — PROGRESS NOTES
Rescheduled annual assessment 10/17 at 12pm.   Batool Mai RN, BSN, PHN  [facility]  [phone]  Fax: [phone]  ]

## 2017-10-03 DIAGNOSIS — J44.9 COPD (CHRONIC OBSTRUCTIVE PULMONARY DISEASE) (H): Primary | ICD-10-CM

## 2017-10-03 NOTE — TELEPHONE ENCOUNTER
Contacted by pt's pharmacy to request change in medication from Symbicort to Dulera d/t limited formulary.  Reviewed with Dr. Packer and new Rx written for Dulera.

## 2017-10-04 DIAGNOSIS — M54.50 CHRONIC LOW BACK PAIN WITHOUT SCIATICA, UNSPECIFIED BACK PAIN LATERALITY: ICD-10-CM

## 2017-10-04 DIAGNOSIS — G89.29 CHRONIC LOW BACK PAIN WITHOUT SCIATICA, UNSPECIFIED BACK PAIN LATERALITY: ICD-10-CM

## 2017-10-04 NOTE — TELEPHONE ENCOUNTER
Controlled Substance Refill Request for Oxycodone    Last refill: 9/7/17, 90 tablets, 30 day supply     Last clinic visit: 9/1/17    Next appt: N/A    Controlled substance agreement on file: Yes:  Date 2/16/17.    Documentation in problem list reviewed:  Yes    Processing:  Patient will  in clinic    RX [place] reviewed:  reviewed- no concerns  [place]  [email or web address]    Thank you!  Helga Marquis RN

## 2017-10-04 NOTE — TELEPHONE ENCOUNTER
oxycodone      Last Written Prescription Date:  9/1/2017  Last Fill Quantity: 90,   # refills: 0  Last Office Visit with [facility], [facility] or [facility] prescribing provider: 10/3/2017  Future Office visit:    Next 5 appointments (look out 90 days)     Oct 04, 2017 11:00 AM CDT   Office Visit with Pratibha Barth   [facility] ([facility])    [address] [place] [id number] [phone]                   Routing refill request to provider for review/approval because:  Drug not on the [facility], [facility] or [facility] refill protocol or controlled substance    Bucky Greene, Pharm.D.  [facility]  [phone]

## 2017-10-05 RX ORDER — OXYCODONE HYDROCHLORIDE 10 MG/1
10 TABLET ORAL EVERY 8 HOURS PRN
Qty: 90 TABLET | Refills: 0 | Status: SHIPPED | OUTPATIENT
Start: 2017-10-05 | End: 2017-10-20

## 2017-10-05 NOTE — TELEPHONE ENCOUNTER
Attempted to call Pt to inform that Rx was complete.  Unable to contact Pt or LVM.    Juan Miles RN

## 2017-10-09 ENCOUNTER — TELEPHONE (OUTPATIENT)
Dept: NEPHROLOGY | Facility: CLINIC | Age: 72
End: 2017-10-09

## 2017-10-10 ENCOUNTER — CARE COORDINATION (OUTPATIENT)
Dept: GERIATRIC MEDICINE | Facility: CLINIC | Age: 72
End: 2017-10-10

## 2017-10-10 NOTE — TELEPHONE ENCOUNTER
Reviewed pt's EPIC chart with records available to date. Pt has CKD stage 4 from diabetes type 2 per Dr. Bhargav Lopes's note January 2017, pt is on insulin. Next nephrology appointment was in September 2017 with Wendy Espinal NP. Mention of recommendation for kidney biopsy however I do not believe it actually was completed. Pt also has a solitary native kidney due to donating a kidney in 1988. History of smoking with COPD and currently still smoking ( just saw Dr. Packer in pulmonology for f/u of COPD and in her note she recommends OK for pt to proceed with kidney transplant ), history of coronary artery disease and MI, history of peripheral vascular disease, HTN, hyperlipidemia, hypothyroidism, low back pain. Colonoscopy in 2011 report states pt should repeat in 5 years. Pt declined recent recommendation for mammogram - last one 2015. Last PAP 10/2014 - due now. BMI 31. All OK to proceed with kidney transplant evaluation.     I called pt today as scheduled and spoke to pt at length. I introduced myself and explained my role. Pt confirmed she is interested in pursuing kidney transplant. Pt stating she donated her kidney to her brother in 1988, he has since passed away. I reviewed the pre kidney transplant evaluation components and process. I explained a  from our Office will call her to confirm her appts here on 10/25/2017.  Pt seemed unaware that her mammogram/PAP and colonoscopy procedures where due - instructed pt to make an appt with her PCP to review this and to get assistance with making appts. Instructed pt to bring someone along with her to appts. Instructed pt to call me with questions. Pt expressed good understanding of all and was in good agreement with the plan.     Smart set orders into [employer] today for pre kindey transplant eval - routed to .

## 2017-10-10 NOTE — PROGRESS NOTES
Arranged transportation thru [facility] for the below appt:  Appt Date & Time: 10/11 @ 4:00 pm  Clinic Name & Address:  [address] [address]  Transportation Provider: [employer]   time:  3:00 pm    Arranged transportation thru [facility] for the below appt:  Appt Date & Time: 10/13 @ 10:00 am  Clinic Name & Address:  [address] [address]  Transportation Provider: [employer]   time:  9:00 am    Arranged transportation thru [facility] for the below appt:  Appt Date & Time: 10/25 @ 6:10 am  Clinic Name & Address:  [address] [address]  Transportation Provider: [employer]   time:  5:10 am    Notified Kim of  time.    Brianna Barrios  Case Management Specialist  [facility]   [phone]

## 2017-10-11 ENCOUNTER — ALLIED HEALTH/NURSE VISIT (OUTPATIENT)
Dept: TRANSPLANT | Facility: CLINIC | Age: 72
End: 2017-10-11
Attending: PHYSICIAN ASSISTANT
Payer: COMMERCIAL

## 2017-10-11 DIAGNOSIS — N18.4 CKD (CHRONIC KIDNEY DISEASE) STAGE 4, GFR 15-29 ML/MIN (H): Primary | ICD-10-CM

## 2017-10-11 LAB
DLCOUNC-%PRED-PRE: 79 %
DLCOUNC-PRE: 16.54 ML/MIN/MMHG
DLCOUNC-PRED: 20.7 ML/MIN/MMHG
ERV-%PRED-PRE: 80 %
ERV-PRE: 0.39 L
ERV-PRED: 0.48 L
EXPTIME-PRE: 11.54 SEC
FEF2575-%PRED-PRE: 58 %
FEF2575-PRE: 1.03 L/SEC
FEF2575-PRED: 1.75 L/SEC
FEFMAX-%PRED-PRE: 68 %
FEFMAX-PRE: 3.88 L/SEC
FEFMAX-PRED: 5.64 L/SEC
FEV1-%PRED-PRE: 73 %
FEV1-PRE: 1.52 L
FEV1FEV6-PRE: 72 %
FEV1FEV6-PRED: 79 %
FEV1FVC-PRE: 71 %
FEV1FVC-PRED: 79 %
FEV1SVC-PRE: 66 %
FEV1SVC-PRED: 66 %
FIFMAX-PRE: 3.13 L/SEC
FVC-%PRED-PRE: 81 %
FVC-PRE: 2.15 L
FVC-PRED: 2.65 L
IC-%PRED-PRE: 72 %
IC-PRE: 1.92 L
IC-PRED: 2.67 L
VA-%PRED-PRE: 76 %
VA-PRE: 3.98 L
VC-%PRED-PRE: 73 %
VC-PRE: 2.31 L
VC-PRED: 3.15 L

## 2017-10-11 NOTE — TELEPHONE ENCOUNTER
"Called pt to verify for \"save the date\" of OCt 25 still was a good date for her, she confirmed yes, informed her that a 2nd date would be added and I would call back to confirm with her later today.  "

## 2017-10-11 NOTE — MR AVS SNAPSHOT
After Visit Summary   10/11/2017    Kim Anne    MRN: [id number]           Patient Information     Date Of Birth          1945        Visit Information        Provider Department      10/11/2017 4:00 PM  NEPHROLOGY NURSE [facility]        Today's Diagnoses     CKD (chronic kidney disease) stage 4, GFR 15-29 ml/min (H)    -  1       Follow-ups after your visit        Your next 10 appointments already scheduled     Oct 13, 2017 10:30 AM CDT   (Arrive by 10:00 AM)   Return Visit with Wendy Espinal PA-C   [facility] ([facility])    [address]  3rd Floor  [facility] [id number] [phone]            Oct 20, 2017  3:30 PM CDT   Office Visit with Pratibha Barth   [facility] ([facility])    [address] [place] [address] [address] [place] [address] [phone]           Bring a current list of meds and any records pertaining to this visit. For Physicals, please bring immunization records and any forms needing to be filled out. Please arrive 10 minutes early to complete paperwork.            Oct 20, 2017  4:00 PM CDT   Return Visit with JUNIOR Rodriguez CNP   [facility] ([facility])    [address] [place] [id number] [phone]            Oct 25, 2017  6:30 AM CDT   LAB with  LAB   [facility] ([facility])    [address] [place] [address]  1st Floor  [facility] [id number] [phone]           Patient must bring picture ID. Patient should be prepared to give a urine specimen  Please do not eat 10-12 hours before your appointment if you are coming in fasting for labs on lipids, cholesterol, or glucose (sugar). Pregnant women should follow their Care Team instructions. Water with medications is okay. Do not drink coffee or  other fluids. If you have concerns about taking  your medications, please ask at office or if scheduling via [employer], send a message by clicking on Secure Messaging, Message Your Care Team.            Oct 25, 2017  7:50 AM CDT   Transplant Class-Kidney with UC TRANSPLANT CLASS   [facility] ([facility])    [address] [place] [phone]   [phone]            Oct 25, 2017  9:00 AM CDT   NUTRITION VISIT with Sasha Adams RD   [facility] ([facility])    [address] [place] [phone]   [phone]            Oct 25, 2017  9:30 AM CDT   (Arrive by 9:15 AM)   Surgery Consult with UC PKE PATIENT 3   [facility] ([facility])    [address] [place] [phone]   [phone]            Oct 25, 2017 10:15 AM CDT   (Arrive by 10:00 AM)   SOT CARE COORDINATOR EVAL with Carlota Beckwith RN   [facility] ([facility])    [address] [place] [phone]   [phone]            Oct 25, 2017 10:30 AM CDT   Nephrology Consult with UC PKE PATIENT 3   [facility] ([facility])    [address] [place] [address]   [phone]            Oct 25, 2017  3:00 PM CDT   Ech Complete with [email or web address]   [facility] ([facility])    [address] [place] [id number] [phone]           1. Please bring or wear a comfortable two-piece outfit. 2. You may eat, drink and take your normal medicines. 3. For any questions that cannot be answered, please contact the ordering physician              Who to contact     If you have questions or need follow up information about today's clinic visit or your schedule please contact [facility] directly at [phone].  Normal or non-critical lab and imaging results will be communicated to you by MyChart, letter or phone within 4 business days after the clinic has received the results. If you do not hear from us within 7 days, please contact the clinic through [employer] or phone. If you have a critical or abnormal lab result, we will notify you by phone as soon as possible.  Submit refill requests through [employer] or call your pharmacy and they will forward the refill request to us. Please allow 3 business days for your refill to be completed.          Additional Information About Your Visit        [employer] Information     [employer] lets you send messages to your doctor, view your test results, renew your prescriptions, schedule appointments and more. To sign up, go to [email or web address] . Click on \"Log in\" on the left side of the screen, which will take you to the Welcome page. Then click on \"Sign up Now\" on the right side of the page.     You will be asked to enter the access code listed below, as well as some personal information. Please follow the directions to create your username and password.     Your access code is: [email or web address]  Expires: 2017  9:37 AM     Your access code will  in 90 days. If you need help or a new code, please call your [facility] clinic or [phone].        Care EveryWhere ID     This is your Care EveryWhere ID. This could be used by other organizations to access your [facility] medical records  [id number]         Blood Pressure from Last 3 Encounters:   17 167/73   17 180/70   17 124/82    Weight from Last 3 Encounters:   17 84.6 kg (186 lb 6.4 oz)   17 83.6 kg (184 lb 6.4 oz)   17 82.1 kg (181 lb)              Today, you had the following     No orders found for display       Primary Care Provider Office Phone # Fax #    JUNIOR Rodriguez [phone]       [address]  [facility] [address]        Equal Access to Services     CECIL TOLLIVER : Hadii aad ku hadjajanet [place], wadellada cubajoannaha, qaybta mookmakassandra dow. So [facility] [phone].    ATENCIÓN: Si habla español, tiene a bailey disposición servicios gratuitos de asistencia lingüística. Vicenteame al [phone].    We comply with applicable federal civil rights laws and [place] laws. We do not discriminate on the basis of race, color, national origin, age, disability, sex, sexual orientation, or gender identity.            Thank you!     Thank you for choosing [facility]  for your care. Our goal is always to provide you with excellent care. Hearing back from our patients is one way we can continue to improve our services. Please take a few minutes to complete the written survey that you may receive in the mail after your visit with us. Thank you!             Your Updated Medication List - Protect others around you: Learn how to safely use, store and throw away your medicines at www.disposemymeds.org.          This list is accurate as of: 10/11/17 11:59 PM.  Always use your most recent med list.                   Brand Name Dispense Instructions for use Diagnosis    albuterol 108 (90 BASE) MCG/ACT Inhaler    PROAIR HFA/PROVENTIL HFA/VENTOLIN HFA    18 g    Inhale 2 puffs into the lungs every 6 hours as needed for shortness of breath / dyspnea or wheezing    Chronic obstructive pulmonary disease, unspecified COPD type (H)       ASPIRIN LOW DOSE 81 MG EC tablet   Generic drug:  aspirin     30 tablet    Take 1 tablet (81 mg) by mouth daily    History of MI (myocardial infarction), Essential hypertension, benign       atorvastatin 40 MG tablet    LIPITOR    90 tablet    Take 1 tablet (40 mg) by mouth daily    Hyperlipidemia LDL goal <100       blood glucose monitoring lancets     1 Box    Test BS four times daily as directed    Type 2 diabetes mellitus without  complication, with long-term current use of insulin (H)       blood glucose monitoring test strip    FREESTYLE LITE    50 strip    TEST BLOOD SUGAR TWO TIMES A DAY    Type 2 diabetes mellitus without complication, with long-term current use of insulin (H)       docusate sodium 100 MG capsule    COLACE    60 capsule    Take 1 capsule (100 mg) by mouth 2 times daily    Constipation, unspecified constipation type       EUCERIN CALMING DAILY MOIST Crea     1 Tube    Externally apply 1 dose * topically daily    Type II or unspecified type diabetes mellitus with neurological manifestations, not stated as uncontrolled(250.60) (H)       ferrous sulfate 325 (65 FE) MG tablet    IRON    100 tablet    Take 1 tablet (325 mg) by mouth daily (with breakfast)    Iron deficiency anemia, unspecified       furosemide 40 MG tablet    LASIX    30 tablet    Take 1 tablet (40 mg) by mouth daily    Hyperkalemia       glucose-vitamin C 4-0.006 G Chew    DEX4 GLUCOSE    50 tablet    Take 1 tablet (4 g) by mouth every hour as needed for low blood sugar    Controlled type 2 diabetes mellitus with stage 4 chronic kidney disease, with long-term current use of insulin (H)       hydrocortisone 1 % ointment     30 g    Apply sparingly to affected area three times daily for 14 days.    Rash       insulin glargine 100 UNIT/ML injection    LANTUS    15 mL    Inject 10 Units Subcutaneous every morning    Controlled type 2 diabetes mellitus with stage 4 chronic kidney disease, with long-term current use of insulin (H)       insulin pen needle 31G X 6 MM     120 each    Use as directed    Type 2 diabetes mellitus without complication (H)       levothyroxine 200 MCG tablet    SYNTHROID/LEVOTHROID    90 tablet    Take 1 tablet (200 mcg) by mouth See Admin Instructions New daily increase. Recheck labs in 8 weeks.    Other specified hypothyroidism       losartan 100 MG tablet    COZAAR    90 tablet    Take 1 tablet (100 mg) by mouth daily    Hypertension  associated with diabetes (H)       metoprolol 25 MG 24 hr tablet    TOPROL-XL    90 tablet    Take 1 tablet (25 mg) by mouth daily    Hypertension associated with diabetes (H)       mometasone-formoterol 100-5 MCG/ACT oral inhaler    DULERA    1 Inhaler    Inhale 2 puffs into the lungs 2 times daily    COPD (chronic obstructive pulmonary disease) (H)       nitroGLYcerin 0.4 MG sublingual tablet    NITROSTAT    25 tablet    Place 1 tablet (0.4 mg) under the tongue every 5 minutes as needed for chest pain    History of MI (myocardial infarction)       nystatin 729365 UNIT/GM Powd    MYCOSTATIN    30 g    Apply 1 g topically 3 times daily as needed    Groin rash       * order for DME     1 Device    One wheeled walker with seat and brakes and basket    Risk for falls       * order for DME     2 Device    Equipment being ordered: two pairs moderate knee high support hose    Edema, unspecified type       oxyCODONE 10 MG IR tablet    ROXICODONE    90 tablet    Take 1 tablet (10 mg) by mouth every 8 hours as needed    Chronic low back pain without sciatica, unspecified back pain laterality       polyethylene glycol powder    MIRALAX    510 g    Take 17 g (1 capful) by mouth daily    Slow transit constipation       SM ALCOHOL PREP 70 % Pads     100 each    Externally apply 1 pad topically 4 times daily    Type 2 diabetes mellitus without complication, with long-term current use of insulin (H)       sodium bicarbonate 325 MG tablet     180 tablet    Take 2 tablets (650 mg) by mouth 3 times daily    Acidosis, CKD (chronic kidney disease) stage 4, GFR 15-29 ml/min (H)       tiotropium 18 MCG capsule    SPIRIVA HANDIHALER    90 capsule    Inhale contents of one capsule daily.    Pulmonary emphysema, unspecified emphysema type (H)       varenicline 0.5 MG X 11 & 1 MG X 42 tablet    CHANTIX STARTING MONTH PAK 53 tablet    Take 0.5 mg tab daily for 3 days, then 0.5 mg tab twice daily for 4 days, then 1 mg twice daily.     Encounter for smoking cessation counseling       vitamin D 2000 UNITS tablet     60 tablet    Take 2,000 Units by mouth daily    Vitamin D deficiency disease       * Notice:  This list has 2 medication(s) that are the same as other medications prescribed for you. Read the directions carefully, and ask your doctor or other care provider to review them with you.

## 2017-10-13 ENCOUNTER — OFFICE VISIT (OUTPATIENT)
Dept: NEPHROLOGY | Facility: CLINIC | Age: 72
End: 2017-10-13
Attending: INTERNAL MEDICINE
Payer: MEDICARE

## 2017-10-13 VITALS
DIASTOLIC BLOOD PRESSURE: 70 MMHG | WEIGHT: 177.6 LBS | BODY MASS INDEX: 29.59 KG/M2 | HEART RATE: 82 BPM | OXYGEN SATURATION: 96 % | HEIGHT: 65 IN | SYSTOLIC BLOOD PRESSURE: 174 MMHG

## 2017-10-13 DIAGNOSIS — N18.4 CHRONIC KIDNEY DISEASE, STAGE 4 (SEVERE) (H): Primary | ICD-10-CM

## 2017-10-13 DIAGNOSIS — N18.4 CKD (CHRONIC KIDNEY DISEASE) STAGE 4, GFR 15-29 ML/MIN (H): ICD-10-CM

## 2017-10-13 LAB
ALBUMIN SERPL-MCNC: 1.8 G/DL (ref 3.4–5)
ANION GAP SERPL CALCULATED.3IONS-SCNC: 8 MMOL/L (ref 3–14)
BUN SERPL-MCNC: 33 MG/DL (ref 7–30)
CALCIUM SERPL-MCNC: 8.2 MG/DL (ref 8.5–10.1)
CHLORIDE SERPL-SCNC: 112 MMOL/L (ref 94–109)
CO2 SERPL-SCNC: 23 MMOL/L (ref 20–32)
CREAT SERPL-MCNC: 3.07 MG/DL (ref 0.52–1.04)
CREAT UR-MCNC: 54 MG/DL
ERYTHROCYTE [DISTWIDTH] IN BLOOD BY AUTOMATED COUNT: 14.2 % (ref 10–15)
GFR SERPL CREATININE-BSD FRML MDRD: 15 ML/MIN/1.7M2
GLUCOSE SERPL-MCNC: 109 MG/DL (ref 70–99)
HCT VFR BLD AUTO: 37.3 % (ref 35–47)
HGB BLD-MCNC: 11.6 G/DL (ref 11.7–15.7)
MCH RBC QN AUTO: 28.5 PG (ref 26.5–33)
MCHC RBC AUTO-ENTMCNC: 31.1 G/DL (ref 31.5–36.5)
MCV RBC AUTO: 92 FL (ref 78–100)
PHOSPHATE SERPL-MCNC: 3.9 MG/DL (ref 2.5–4.5)
PLATELET # BLD AUTO: 320 10E9/L (ref 150–450)
POTASSIUM SERPL-SCNC: 4.5 MMOL/L (ref 3.4–5.3)
PROT UR-MCNC: 7.49 G/L
PROT/CREAT 24H UR: 13.82 G/G CR (ref 0–0.2)
RBC # BLD AUTO: 4.07 10E12/L (ref 3.8–5.2)
SODIUM SERPL-SCNC: 142 MMOL/L (ref 133–144)
WBC # BLD AUTO: 7.5 10E9/L (ref 4–11)

## 2017-10-13 PROCEDURE — 84156 ASSAY OF PROTEIN URINE: CPT | Performed by: PHYSICIAN ASSISTANT

## 2017-10-13 PROCEDURE — 36415 COLL VENOUS BLD VENIPUNCTURE: CPT | Performed by: PHYSICIAN ASSISTANT

## 2017-10-13 PROCEDURE — 99212 OFFICE O/P EST SF 10 MIN: CPT | Mod: ZF

## 2017-10-13 PROCEDURE — 80069 RENAL FUNCTION PANEL: CPT | Performed by: PHYSICIAN ASSISTANT

## 2017-10-13 PROCEDURE — 85027 COMPLETE CBC AUTOMATED: CPT | Performed by: PHYSICIAN ASSISTANT

## 2017-10-13 ASSESSMENT — PAIN SCALES - GENERAL: PAINLEVEL: MODERATE PAIN (4)

## 2017-10-13 NOTE — MR AVS SNAPSHOT
After Visit Summary   10/13/2017    Kim Anne    MRN: [id number]           Patient Information     Date Of Birth          1945        Visit Information        Provider Department      10/13/2017 10:30 AM Wendy Espinal PA-C [facility] Nephrology        Care Instructions    1.  Check blood pressures at home daily (few hours after taking medications) and heart rate.  Record those values and call us next Wednesday or Thursday with readings.  If BPs are above 150 we will plan to increase the Metoprolol.    2. Let us know if gaining weight or if fluid in legs is increasing.    3.  Labs in one month.    4.  Follow-up visit in 2 months.    5.  Access appointment with surgeon.          Follow-ups after your visit        Follow-up notes from your care team     Return in about 2 months (around 12/13/2017).      Your next 10 appointments already scheduled     Oct 20, 2017  3:30 PM CDT   Office Visit with Pratibha Barth   [facility] ([facility])    [address] [place] [id number] [phone]           Bring a current list of meds and any records pertaining to this visit. For Physicals, please bring immunization records and any forms needing to be filled out. Please arrive 10 minutes early to complete paperwork.            Oct 20, 2017  4:00 PM CDT   Return Visit with JUNIOR Rodriguez CNP   [facility]    [address] [place] [phone]            Oct 25, 2017  6:30 AM CDT   LAB with  LAB    Health Lab ([facility])    [address]  1st Floor  [facility] [id number] [phone]           Patient must bring picture ID. Patient should be prepared to give a urine specimen  Please do not eat 10-12 hours before your appointment if you are  coming in fasting for labs on lipids, cholesterol, or glucose (sugar). Pregnant women should follow their Care Team instructions. Water with medications is okay. Do not drink coffee or other fluids. If you have concerns about taking  your medications, please ask at office or if scheduling via [employer], send a message by clicking on Secure Messaging, Message Your Care Team.            Oct 25, 2017  7:50 AM CDT   Transplant Class-Kidney with UC TRANSPLANT CLASS   [facility] Solid Organ Transplant ([facility])    [address] [place] [phone]   [phone]            Oct 25, 2017  9:00 AM CDT   NUTRITION VISIT with Sasha Adams RD   [facility] Solid Organ Transplant ([facility])    [address] [place] [phone]   [phone]            Oct 25, 2017  9:30 AM CDT   (Arrive by 9:15 AM)   Surgery Consult with  PKE PATIENT 3   [facility] Solid Organ Transplant ([facility])    [address] [place] [phone]   [phone]            Oct 25, 2017 10:15 AM CDT   (Arrive by 10:00 AM)   SOT CARE COORDINATOR EVAL with Carlota Beckwith RN   [facility] Solid Organ Transplant ([facility])    [address] [place] [id number]   [phone]            Oct 25, 2017 10:30 AM CDT   Nephrology Consult with UC PKE PATIENT 3   [facility] Solid Organ Transplant ([facility])    [address] [place] [phone]   [phone]            Oct 25, 2017 11:15 AM CDT   (Arrive by 11:00 AM)   SOT SOCIAL WORK EVAL with ANA Dominguez   [facility] Solid Organ Transplant ([facility])    [address] [place] [phone]   [phone]            Oct 25, 2017  3:00 PM CDT   Ech Complete with UCECHCR2   [facility] Echo ([facility]    [address]  3rd [facility] [address] [phone]           1. Please bring or wear a comfortable two-piece outfit. 2. You may eat, drink and take your normal medicines. 3. For any questions that cannot be answered, please contact the ordering physician              Who to contact     If you have questions or need follow up information about today's clinic visit or your schedule please contact [facility] NEPHROLOGY directly at [phone].  Normal or non-critical lab and imaging results will be communicated to you by MyChart, letter or phone within 4 business days after the clinic has received the results. If you do not hear from us within 7 days, please contact the clinic through [employer] or phone. If you have a critical or abnormal lab result, we will notify you by phone as soon as possible.  Submit refill requests through [employer] or call your pharmacy and they will forward the refill request to us. Please allow 3 business days for your refill to be completed.          Additional Information About Your Visit        [employer] Information     [employer] lets you send messages to your doctor, view your test results, renew your prescriptions, schedule appointments and more. To sign up, go to [email or web address] . Click on \"Log in\" on the left side of the screen, which will take you to the Welcome page. Then click on \"Sign up Now\" on the right side of the page.     You will be asked to enter the access code listed below, as well as some personal information. Please follow the directions to create your username and password.     Your access code is: [email or web address]  Expires: 2017  9:37 AM     Your access code will  in 90 days. If you need help or a new code, please call your [place] clinic or [phone].        Care EveryWhere ID     This is your Care EveryWhere ID. This could be used by other organizations to access your [place] medical records  [id number]      " "  Your Vitals Were     Pulse Height Pulse Oximetry BMI (Body Mass Index)          82 1.651 m (5' 5\") 96% 29.55 kg/m2         Blood Pressure from Last 3 Encounters:   10/13/17 174/70   09/28/17 167/73   09/08/17 180/70    Weight from Last 3 Encounters:   10/13/17 80.6 kg (177 lb 9.6 oz)   09/28/17 84.6 kg (186 lb 6.4 oz)   09/08/17 83.6 kg (184 lb 6.4 oz)              Today, you had the following     No orders found for display       Primary Care Provider Office Phone # Fax #    JUNIOR Rodriguez [phone]       [address] [facility] [address]  [facility] [address]        Equal Access to Services     [facility] UNRULY : Hadii alysha graff Soluiz, waaxda luqadaha, qaybta kaalmada stephanie, kassandra white . So [facility] [phone].    ATENCIÓN: Si habla español, tiene a bailey disposición servicios gratuitos de asistencia lingüística. Khadijah al [phone].    We comply with applicable federal civil rights laws and [place] laws. We do not discriminate on the basis of race, color, national origin, age, disability, sex, sexual orientation, or gender identity.            Thank you!     Thank you for choosing [facility] NEPHROLOG[facility]  for your care. Our goal is always to provide you with excellent care. Hearing back from our patients is one way we can continue to improve our services. Please take a few minutes to complete the written survey that you may receive in the mail after your visit with us. Thank you!             Your Updated Medication List - Protect others around you: Learn how to safely use, store and throw away your medicines at www.disposemymeds.org.          This list is accurate as of: 10/13/17 12:02 PM.  Always use your most recent med list.                   Brand Name Dispense Instructions for use Diagnosis    albuterol 108 (90 BASE) MCG/ACT Inhaler    PROAIR HFA/PROVENTIL HFA/VENTOLIN HFA    18 g    Inhale 2 puffs into the lungs every 6 hours as needed for shortness of " breath / dyspnea or wheezing    Chronic obstructive pulmonary disease, unspecified COPD type (H)       ASPIRIN LOW DOSE 81 MG EC tablet   Generic drug:  aspirin     30 tablet    Take 1 tablet (81 mg) by mouth daily    History of MI (myocardial infarction), Essential hypertension, benign       atorvastatin 40 MG tablet    LIPITOR    90 tablet    Take 1 tablet (40 mg) by mouth daily    Hyperlipidemia LDL goal <100       blood glucose monitoring lancets     1 Box    Test BS four times daily as directed    Type 2 diabetes mellitus without complication, with long-term current use of insulin (H)       blood glucose monitoring test strip    FREESTYLE LITE    50 strip    TEST BLOOD SUGAR TWO TIMES A DAY    Type 2 diabetes mellitus without complication, with long-term current use of insulin (H)       docusate sodium 100 MG capsule    COLACE    60 capsule    Take 1 capsule (100 mg) by mouth 2 times daily    Constipation, unspecified constipation type       EUCERIN CALMING DAILY MOIST Crea     1 Tube    Externally apply 1 dose * topically daily    Type II or unspecified type diabetes mellitus with neurological manifestations, not stated as uncontrolled(250.60) (H)       ferrous sulfate 325 (65 FE) MG tablet    IRON    100 tablet    Take 1 tablet (325 mg) by mouth daily (with breakfast)    Iron deficiency anemia, unspecified       furosemide 40 MG tablet    LASIX    30 tablet    Take 1 tablet (40 mg) by mouth daily    Hyperkalemia       glucose-vitamin C 4-0.006 G Chew    DEX4 GLUCOSE    50 tablet    Take 1 tablet (4 g) by mouth every hour as needed for low blood sugar    Controlled type 2 diabetes mellitus with stage 4 chronic kidney disease, with long-term current use of insulin (H)       hydrocortisone 1 % ointment     30 g    Apply sparingly to affected area three times daily for 14 days.    Rash       insulin glargine 100 UNIT/ML injection    LANTUS    15 mL    Inject 10 Units Subcutaneous every morning    Controlled type  2 diabetes mellitus with stage 4 chronic kidney disease, with long-term current use of insulin (H)       insulin pen needle 31G X 6 MM     120 each    Use as directed    Type 2 diabetes mellitus without complication (H)       levothyroxine 200 MCG tablet    SYNTHROID/LEVOTHROID    90 tablet    Take 1 tablet (200 mcg) by mouth See Admin Instructions New daily increase. Recheck labs in 8 weeks.    Other specified hypothyroidism       losartan 100 MG tablet    COZAAR    90 tablet    Take 1 tablet (100 mg) by mouth daily    Hypertension associated with diabetes (H)       metoprolol 25 MG 24 hr tablet    TOPROL-XL    90 tablet    Take 1 tablet (25 mg) by mouth daily    Hypertension associated with diabetes (H)       mometasone-formoterol 100-5 MCG/ACT oral inhaler    DULERA    1 Inhaler    Inhale 2 puffs into the lungs 2 times daily    COPD (chronic obstructive pulmonary disease) (H)       nitroGLYcerin 0.4 MG sublingual tablet    NITROSTAT    25 tablet    Place 1 tablet (0.4 mg) under the tongue every 5 minutes as needed for chest pain    History of MI (myocardial infarction)       nystatin 669535 UNIT/GM Powd    MYCOSTATIN    30 g    Apply 1 g topically 3 times daily as needed    Groin rash       * order for DME     1 Device    One wheeled walker with seat and brakes and basket    Risk for falls       * order for DME     2 Device    Equipment being ordered: two pairs moderate knee high support hose    Edema, unspecified type       oxyCODONE 10 MG IR tablet    ROXICODONE    90 tablet    Take 1 tablet (10 mg) by mouth every 8 hours as needed    Chronic low back pain without sciatica, unspecified back pain laterality       polyethylene glycol powder    MIRALAX    510 g    Take 17 g (1 capful) by mouth daily    Slow transit constipation       SM ALCOHOL PREP 70 % Pads     100 each    Externally apply 1 pad topically 4 times daily    Type 2 diabetes mellitus without complication, with long-term current use of insulin (H)        sodium bicarbonate 325 MG tablet     180 tablet    Take 2 tablets (650 mg) by mouth 3 times daily    Acidosis, CKD (chronic kidney disease) stage 4, GFR 15-29 ml/min (H)       tiotropium 18 MCG capsule    SPIRIVA HANDIHALER    90 capsule    Inhale contents of one capsule daily.    Pulmonary emphysema, unspecified emphysema type (H)       varenicline 0.5 MG X 11 & 1 MG X 42 tablet    CHANTIX STARTING MONTH GRETEL    53 tablet    Take 0.5 mg tab daily for 3 days, then 0.5 mg tab twice daily for 4 days, then 1 mg twice daily.    Encounter for smoking cessation counseling       vitamin D 2000 UNITS tablet     60 tablet    Take 2,000 Units by mouth daily    Vitamin D deficiency disease       * Notice:  This list has 2 medication(s) that are the same as other medications prescribed for you. Read the directions carefully, and ask your doctor or other care provider to review them with you.

## 2017-10-13 NOTE — PATIENT INSTRUCTIONS
1.  Check blood pressures at home daily (few hours after taking medications) and heart rate.  Record those values and call us next Wednesday or Thursday with readings.  If BPs are above 150 we will plan to increase the Metoprolol.    2. Let us know if gaining weight or if fluid in legs is increasing.    3.  Labs in one month.    4.  Follow-up visit in 2 months.    5.  Access appointment with surgeon.

## 2017-10-13 NOTE — NURSING NOTE
"Chief Complaint   Patient presents with     RECHECK     Follow up CKD stage 4.       Initial /70  Pulse 82  Ht 1.651 m (5' 5\")  Wt 80.6 kg (177 lb 9.6 oz)  SpO2 96%  BMI 29.55 kg/m2 Estimated body mass index is 29.55 kg/(m^2) as calculated from the following:    Height as of this encounter: 1.651 m (5' 5\").    Weight as of this encounter: 80.6 kg (177 lb 9.6 oz).  Medication Reconciliation: complete   Yuliet Dickson., CMA    "
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
## 2017-10-17 NOTE — PROGRESS NOTES
Call placed to member to confirm visit.  Member canceled. CM attempts to reschedule.  Member states will call CM next week to reschedule.   Batool Mai RN, BSN, PHN  [facility]  [phone]  Fax: [phone]

## 2017-10-18 ENCOUNTER — CARE COORDINATION (OUTPATIENT)
Dept: GERIATRIC MEDICINE | Facility: CLINIC | Age: 72
End: 2017-10-18

## 2017-10-18 NOTE — PROGRESS NOTES
Arranged transportation thru [facility] for the below appt:  Appt Date & Time: 10/20 @ 3:30 pm  Clinic Name & Address:  [address], [facility] [address], [place] [address]  Transportation Provider: [employer]   time:  2:30 pm    Arranged transportation thru [facility] for the below appt:  Appt Date & Time: 10/25 @ 6:30 am  Clinic Name & Address:  [place] [id number]  Transportation Provider: [employer]   time:  5:30 am    Notified Kim of  time.    Brianna Barrios  Case Management Specialist  [facility]   [phone]

## 2017-10-20 ENCOUNTER — TELEPHONE (OUTPATIENT)
Dept: NEPHROLOGY | Facility: CLINIC | Age: 72
End: 2017-10-20

## 2017-10-20 ENCOUNTER — OFFICE VISIT (OUTPATIENT)
Dept: FAMILY MEDICINE | Facility: CLINIC | Age: 72
End: 2017-10-20
Payer: MEDICARE

## 2017-10-20 ENCOUNTER — OFFICE VISIT (OUTPATIENT)
Dept: PHARMACY | Facility: CLINIC | Age: 72
End: 2017-10-20
Payer: COMMERCIAL

## 2017-10-20 VITALS
OXYGEN SATURATION: 97 % | DIASTOLIC BLOOD PRESSURE: 58 MMHG | TEMPERATURE: 98.3 F | BODY MASS INDEX: 29.45 KG/M2 | HEART RATE: 95 BPM | WEIGHT: 177 LBS | SYSTOLIC BLOOD PRESSURE: 140 MMHG

## 2017-10-20 DIAGNOSIS — E11.59 HYPERTENSION ASSOCIATED WITH DIABETES (H): ICD-10-CM

## 2017-10-20 DIAGNOSIS — G89.29 CHRONIC LOW BACK PAIN WITHOUT SCIATICA, UNSPECIFIED BACK PAIN LATERALITY: ICD-10-CM

## 2017-10-20 DIAGNOSIS — N18.4 CKD (CHRONIC KIDNEY DISEASE) STAGE 4, GFR 15-29 ML/MIN (H): ICD-10-CM

## 2017-10-20 DIAGNOSIS — I15.2 HYPERTENSION ASSOCIATED WITH DIABETES (H): ICD-10-CM

## 2017-10-20 DIAGNOSIS — E03.9 ACQUIRED HYPOTHYROIDISM: Primary | ICD-10-CM

## 2017-10-20 DIAGNOSIS — E03.8 OTHER SPECIFIED HYPOTHYROIDISM: Primary | ICD-10-CM

## 2017-10-20 DIAGNOSIS — I15.0 RENOVASCULAR HYPERTENSION: Primary | ICD-10-CM

## 2017-10-20 DIAGNOSIS — M54.50 CHRONIC LOW BACK PAIN WITHOUT SCIATICA, UNSPECIFIED BACK PAIN LATERALITY: ICD-10-CM

## 2017-10-20 DIAGNOSIS — Z71.6 ENCOUNTER FOR SMOKING CESSATION COUNSELING: ICD-10-CM

## 2017-10-20 LAB — TSH SERPL DL<=0.005 MIU/L-ACNC: 1.04 MU/L (ref 0.4–4)

## 2017-10-20 PROCEDURE — 99607 MTMS BY PHARM ADDL 15 MIN: CPT | Mod: GY | Performed by: PHARMACIST

## 2017-10-20 PROCEDURE — 99606 MTMS BY PHARM EST 15 MIN: CPT | Mod: GY | Performed by: PHARMACIST

## 2017-10-20 PROCEDURE — 36415 COLL VENOUS BLD VENIPUNCTURE: CPT | Performed by: NURSE PRACTITIONER

## 2017-10-20 PROCEDURE — 99214 OFFICE O/P EST MOD 30 MIN: CPT | Performed by: NURSE PRACTITIONER

## 2017-10-20 PROCEDURE — 84443 ASSAY THYROID STIM HORMONE: CPT | Performed by: NURSE PRACTITIONER

## 2017-10-20 RX ORDER — OXYCODONE HYDROCHLORIDE 10 MG/1
10 TABLET ORAL EVERY 8 HOURS PRN
Qty: 90 TABLET | Refills: 0 | Status: SHIPPED | OUTPATIENT
Start: 2017-11-02 | End: 2017-11-30

## 2017-10-20 NOTE — MR AVS SNAPSHOT
After Visit Summary   10/20/2017    Kim Anne    MRN: [id number]           Patient Information     Date Of Birth          1945        Visit Information        Provider Department      10/20/2017 3:30 PM Pratibha Barth [facility]        Today's Diagnoses     Other specified hypothyroidism    -  1    Encounter for smoking cessation counseling        Hypertension associated with diabetes (H)           Follow-ups after your visit        Your next 10 appointments already scheduled     Nov 06, 2017 10:15 AM CST   LAB with  LAB   [facility] ([facility])    [address] [place] [id number] [phone]           Please do not eat 10-12 hours before your appointment if you are coming in fasting for labs on lipids, cholesterol, or glucose (sugar). This does not apply to pregnant women. Water, hot tea and black coffee (with nothing added) are okay. Do not drink other fluids, diet soda or chew gum.            Nov 06, 2017 10:30 AM CST   US AORTIC IMAGING with UCUSV2   [facility] US ([facility])    [address] [place] [id number] [phone]           Please bring a list of your medicines (including vitamins, minerals and over-the-counter drugs). Also, tell your doctor about any allergies you may have. Wear comfortable clothes and leave your valuables at home.  Adults: No eating or drinking for 8 hours before the exam. You may take medicine with a small sip of water.  Children: - Children 6+ years: No food or drink for 6 hours before exam. - Children 1-5 years: No food or drink for 4 hours before exam. - Infants, breast-fed: may have breast milk up to 2 hours before exam. - Infants, formula: may have bottle until 4 hours before exam.  Please call the Imaging Department at your exam site with any questions.            Nov 06, 2017  11:00 AM CST   US AORTA/IVC/ILIAC DUPLEX COMPLETE with UCUSV2   [facility] ([facility])    [address] [place] [phone]   [phone]           Please bring a list of your medicines (including vitamins, minerals and over-the-counter drugs). Also, tell your doctor about any allergies you may have. Wear comfortable clothes and leave your valuables at home.  Adults: No eating or drinking for 8 hours before the exam. You may take medicine with a small sip of water.  Children: - Children 6+ years: No food or drink for 6 hours before exam. - Children 1-5 years: No food or drink for 4 hours before exam. - Infants, breast-fed: may have breast milk up to 2 hours before exam. - Infants, formula: may have bottle until 4 hours before exam.  Please call the Imaging Department at your exam site with any questions.            Nov 06, 2017  2:30 PM CST   (Arrive by 2:15 PM)   NEW PANCREAS/KIDNEY TRANSPLANT WORK-UP with Kunal Nj MD   [facility] ([facility])    [address] [place] [id number]   [phone]            Dec 15, 2017 10:30 AM CST   (Arrive by 10:00 AM)   Return Visit with Wendy Espinal PA-C   [facility] Nephrology ([facility])    [address] [place] [id number]   [phone]            Nadeem 15, 2018 10:30 AM CST   Return Visit with JUNIOR aDy CNP   [facility] ([facility])    [address]  [address]  [facility] [address]   [phone]            Nadeem 15, 2018 10:30 AM CST   Return Visit with LAMINE Chahal   [facility] ([facility])    [address]  [address]  [facility] [address]   [phone]            Mar 29, 2018  7:30 AM CDT   PFT VISIT with GIOVANNA PFL TAMMI   [facility]  "Pulmonary Function Testing ([facility])    [address] [id number] [phone]            Mar 29, 2018  8:00 AM CDT   (Arrive by 7:45 AM)   Return Visit with Margret Packer MD   [facility] ([facility])    [address] [phone] [phone]              Who to contact     If you have questions or need follow up information about today's clinic visit or your schedule please contact [facility] directly at [phone].  Normal or non-critical lab and imaging results will be communicated to you by MyChart, letter or phone within 4 business days after the clinic has received the results. If you do not hear from us within 7 days, please contact the clinic through MyChart or phone. If you have a critical or abnormal lab result, we will notify you by phone as soon as possible.  Submit refill requests through [employer] or call your pharmacy and they will forward the refill request to us. Please allow 3 business days for your refill to be completed.          Additional Information About Your Visit        MyChart Information     [facility] lets you send messages to your doctor, view your test results, renew your prescriptions, schedule appointments and more. To sign up, go to [email or web address] . Click on \"Log in\" on the left side of the screen, which will take you to the Welcome page. Then click on \"Sign up Now\" on the right side of the page.     You will be asked to enter the access code listed below, as well as some personal information. Please follow the directions to create your username and password.     Your access code is: [email or web address]  Expires: 2017  9:37 AM     Your access code will  in 90 days. If you need help or a new code, please call your [facility] or [phone].        Care EveryWhere ID     This " is your Care EveryWhere ID. This could be used by other organizations to access your [facility] medical records  [id number]         Blood Pressure from Last 3 Encounters:   10/25/17 179/77   10/20/17 140/58   10/13/17 174/70    Weight from Last 3 Encounters:   10/25/17 177 lb 9.6 oz (80.6 kg)   10/20/17 177 lb (80.3 kg)   10/13/17 177 lb 9.6 oz (80.6 kg)              Today, you had the following     No orders found for display         Today's Medication Changes          These changes are accurate as of: 10/20/17 11:59 PM.  If you have any questions, ask your nurse or doctor.               Start taking these medicines.        Dose/Directions    amLODIPine 5 MG tablet   Commonly known as:  NORVASC   Used for:  Renovascular hypertension   Started by:  Wendy Espinal PA-C        Dose:  5 mg   Take 1 tablet (5 mg) by mouth daily   Quantity:  30 tablet   Refills:  3            Where to get your medicines      These medications were sent to [facility] [address]  [address], [facility] [address]     Phone:  [phone]     amLODIPine 5 MG tablet         Some of these will need a paper prescription and others can be bought over the counter.  Ask your nurse if you have questions.     Bring a paper prescription for each of these medications     oxyCODONE 10 MG IR tablet                Primary Care Provider Office Phone # Fax #    Ailyn Eduardohanna Ezequiel, JUNIOR [phone]       [address]  [facility] [address]        Equal Access to Services     CECIL TOLLIVER : Hadii alysha redd hadasho Soomaali, waaxda luqadaha, qaybta kaalmada adeegyafahad, waxay idiin hayaan adeeg kharash la'aan . So [facility] [phone].    ATENCIÓN: Si habla español, tiene a bailey disposición servicios gratuitos de asistencia lingüística. Llame al [phone].    We comply with applicable federal civil rights laws and [place] laws. We do not discriminate on the basis of race, color,  national origin, age, disability, sex, sexual orientation, or gender identity.            Thank you!     Thank you for choosing [facility]  for your care. Our goal is always to provide you with excellent care. Hearing back from our patients is one way we can continue to improve our services. Please take a few minutes to complete the written survey that you may receive in the mail after your visit with us. Thank you!             Your Updated Medication List - Protect others around you: Learn how to safely use, store and throw away your medicines at www.disposemymeds.org.          This list is accurate as of: 10/20/17 11:59 PM.  Always use your most recent med list.                   Brand Name Dispense Instructions for use Diagnosis    albuterol 108 (90 BASE) MCG/ACT Inhaler    PROAIR HFA/PROVENTIL HFA/VENTOLIN HFA    18 g    Inhale 2 puffs into the lungs every 6 hours as needed for shortness of breath / dyspnea or wheezing    Chronic obstructive pulmonary disease, unspecified COPD type (H)       amLODIPine 5 MG tablet    NORVASC    30 tablet    Take 1 tablet (5 mg) by mouth daily    Renovascular hypertension       ASPIRIN LOW DOSE 81 MG EC tablet   Generic drug:  aspirin     30 tablet    Take 1 tablet (81 mg) by mouth daily    History of MI (myocardial infarction), Essential hypertension, benign       atorvastatin 40 MG tablet    LIPITOR    90 tablet    Take 1 tablet (40 mg) by mouth daily    Hyperlipidemia LDL goal <100       blood glucose monitoring lancets     1 Box    Test BS four times daily as directed    Type 2 diabetes mellitus without complication, with long-term current use of insulin (H)       blood glucose monitoring test strip    FREESTYLE LITE    50 strip    TEST BLOOD SUGAR TWO TIMES A DAY    Type 2 diabetes mellitus without complication, with long-term current use of insulin (H)       docusate sodium 100 MG capsule    COLACE    60 capsule    Take 1 capsule (100 mg)  by mouth 2 times daily    Constipation, unspecified constipation type       EUCERIN CALMING DAILY MOIST Crea     1 Tube    Externally apply 1 dose * topically daily    Type II or unspecified type diabetes mellitus with neurological manifestations, not stated as uncontrolled(250.60) (H)       ferrous sulfate 325 (65 FE) MG tablet    IRON    100 tablet    Take 1 tablet (325 mg) by mouth daily (with breakfast)    Iron deficiency anemia, unspecified       furosemide 40 MG tablet    LASIX    30 tablet    Take 1 tablet (40 mg) by mouth daily    Hyperkalemia       glucose-vitamin C 4-0.006 G Chew    DEX4 GLUCOSE    50 tablet    Take 1 tablet (4 g) by mouth every hour as needed for low blood sugar    Controlled type 2 diabetes mellitus with stage 4 chronic kidney disease, with long-term current use of insulin (H)       hydrocortisone 1 % ointment     30 g    Apply sparingly to affected area three times daily for 14 days.    Rash       insulin glargine 100 UNIT/ML injection    LANTUS    15 mL    Inject 10 Units Subcutaneous every morning    Controlled type 2 diabetes mellitus with stage 4 chronic kidney disease, with long-term current use of insulin (H)       insulin pen needle 31G X 6 MM     120 each    Use as directed    Type 2 diabetes mellitus without complication (H)       levothyroxine 200 MCG tablet    SYNTHROID/LEVOTHROID    90 tablet    Take 1 tablet (200 mcg) by mouth See Admin Instructions New daily increase. Recheck labs in 8 weeks.    Other specified hypothyroidism       losartan 100 MG tablet    COZAAR    90 tablet    Take 1 tablet (100 mg) by mouth daily    Hypertension associated with diabetes (H)       metoprolol 25 MG 24 hr tablet    TOPROL-XL    90 tablet    Take 1 tablet (25 mg) by mouth daily    Hypertension associated with diabetes (H)       mometasone-formoterol 100-5 MCG/ACT oral inhaler    DULERA    1 Inhaler    Inhale 2 puffs into the lungs 2 times daily    COPD (chronic obstructive pulmonary  disease) (H)       nitroGLYcerin 0.4 MG sublingual tablet    NITROSTAT    25 tablet    Place 1 tablet (0.4 mg) under the tongue every 5 minutes as needed for chest pain    History of MI (myocardial infarction)       nystatin 664299 UNIT/GM Powd    MYCOSTATIN    30 g    Apply 1 g topically 3 times daily as needed    Groin rash       * order for DME     1 Device    One wheeled walker with seat and brakes and basket    Risk for falls       * order for DME     2 Device    Equipment being ordered: two pairs moderate knee high support hose    Edema, unspecified type       oxyCODONE 10 MG IR tablet   Start taking on:  11/2/2017    ROXICODONE    90 tablet    Take 1 tablet (10 mg) by mouth every 8 hours as needed    Chronic low back pain without sciatica, unspecified back pain laterality       polyethylene glycol powder    MIRALAX    510 g    Take 17 g (1 capful) by mouth daily    Slow transit constipation       SM ALCOHOL PREP 70 % Pads     100 each    Externally apply 1 pad topically 4 times daily    Type 2 diabetes mellitus without complication, with long-term current use of insulin (H)       sodium bicarbonate 325 MG tablet     180 tablet    Take 2 tablets (650 mg) by mouth 3 times daily    Acidosis, CKD (chronic kidney disease) stage 4, GFR 15-29 ml/min (H)       tiotropium 18 MCG capsule    SPIRIVA HANDIHALER    90 capsule    Inhale contents of one capsule daily.    Pulmonary emphysema, unspecified emphysema type (H)       varenicline 0.5 MG X 11 & 1 MG X 42 tablet    CHANTIX STARTING MONTH PAK    53 tablet    Take 0.5 mg tab daily for 3 days, then 0.5 mg tab twice daily for 4 days, then 1 mg twice daily.    Encounter for smoking cessation counseling       vitamin D 2000 UNITS tablet     60 tablet    Take 2,000 Units by mouth daily    Vitamin D deficiency disease       * Notice:  This list has 2 medication(s) that are the same as other medications prescribed for you. Read the directions carefully, and ask your doctor  or other care provider to review them with you.

## 2017-10-20 NOTE — PROGRESS NOTES
SUBJECTIVE:                                                    Kim Anne is a 72 year old  female who presents to clinic today for the following health issues:  MTM: Pratibha Nici    Hair Loss: Patient states that she has been losing hair and concerned is about it. Patient states that she had thick hair growing up but now you can see the top of her head because of how much hair she has lost. Last TSH check was done 9/7/2017. Will repeat lab.     Diabetes Follow-up  Patient states that she has been checking her blood sugars 175-200.   Patient is checking blood sugars: once daily.  Results are as follows:       am - forgot      Diabetic concerns: None     Symptoms of hypoglycemia (low blood sugar): none     Paresthesias (numbness or burning in feet) or sores: Yes    Date of last diabetic eye exam: 2017    Chronic Pain Follow-Up  Patient rates low back pain at 4/10 and usually her worst at 8-10/10.  Type / Location of Pain: back  Analgesia/pain control:       Recent changes:  same      Overall control: Tolerable with discomfort  Activity level/function:      Daily activities:  Can do most things most days, with some rest    Work:  Unable to work  Adverse effects:  No  Adherance    Taking medication as directed?  Yes    Participating in other treatments: not applicable  Risk Factors:    Sleep:  Fair    Mood/anxiety:  controlled    Recent family or social stressors:  none noted    Other aggravating factors: prolonged standing  PHQ-9 SCORE 12/2/2016 12/12/2016 2/14/2017   Total Score - - -   Total Score - - -   Total Score 0 0 1     JUSTINE-7 SCORE 1/13/2016 12/2/2016 12/12/2016   Total Score - - -   Total Score 0 0 0   Total Score - - -     Encounter-Level CSA - 02/13/2017:          Controlled Substance Agreement - Scan on 2/16/2017  2:30 PM : CONTROLLED SUBSTANCE AGREEMENT (below)            Encounter-Level CSA - 02/13/2017:          Controlled Substance Agreement - Scan on 1/16/2017  6:55 PM :  CONTROLLED SUBSTANCE AGREEMENT (below)            Encounter-Level CSA - 02/13/2017:          Controlled Substance Agreement - Scan on 3/8/2016  4:25 AM : CONTROLLED SUBSTANCE AGREEMENT 03/07/16 (below)            Encounter-Level CSA - 02/13/2017:          Controlled Substance Agreement - Scan on 3/5/2015  9:09 AM : Controlled Substance Agreement 03/04/15 (below)                  Amount of exercise or physical activity: 2-3 days/week for an average of 15-30 minutes    Problems taking medications regularly: No    Medication side effects: none  Diet: low salt      Mental Health Follow up   Patient states that she has not been depressed but there has been a lot of family medical health concerns that have been increasing her stress level. Patient states that her nephew went to the ED for a high blood pressure and he is still not feeling well. Patient also reports that she has a niece in the hospital for some fluid in her brain. Patient reports that her son is still getting better little by little. She states that he is very weak from the last time he was in the hospital. Patient states that all these things are causing her to have more stress, but she denies any depression or anxiety.   Patient also notes that she is going up [place] to discuss with her sisters what type of dialysis she will decide to have after meeting with nephrology 10/13/2017. Patient states that she does not remember most of that conversation because she was particularly tired that day. Patient states that she has some information that was provided to her to review because she makes the decision.     Status since last visit: increase stressors.     See PHQ-9 for current symptoms.  Other associated symptoms: None    Complicating factors: dialysis options due to kidney failure.   Significant life event:  No   Current substance abuse:  None  Anxiety or Panic symptoms:  No    Sleep - good.   Appetite - no concerns.   Exercise - walking.     Smoking -  no  Alcohol - no  Street drugs - no  Marijuana - no  Caffeine - no    PHQ-9  English PHQ-9   Any Language           Social History   Substance Use Topics     Smoking status: Current Every Day Smoker     Packs/day: 0.80     Years: 40.00     Types: Cigarettes     Last attempt to quit: 10/31/2016     Smokeless tobacco: Never Used     Alcohol use No        Problem list and histories reviewed & adjusted, as indicated.  Additional history: as documented    Patient Active Problem List   Diagnosis     Hyperlipidemia LDL goal <100     ARAUZ (dyspnea on exertion)     COPD (chronic obstructive pulmonary disease) (H)     Edema     Fatigue     History of MI (myocardial infarction)     Snores     Knee pain, left     Bunion of great toe of left foot     Dystrophic nail     Arthritis     Peripheral vascular disease (H)     Chronic low back pain     Health Care Home     CKD (chronic kidney disease) stage 4, GFR 15-29 ml/min (H)     Abnormal gait     Advance Care Planning     Vitamin D deficiency     Constipation     Hypertension goal BP (blood pressure) < 130/80     Bradycardia     Callus of foot     Other chronic pain     Cataracts, bilateral     Hyperopic astigmatism of both eyes     Presbyopia     Asymptomatic bunion, right     Acquired hypothyroidism     Type 2 diabetes mellitus with diabetic chronic kidney disease (H)     Hypertension associated with diabetes (H)     Hyperlipidemia associated with type 2 diabetes mellitus (H)     Obesity (BMI 30-39.9)     History of sick sinus syndrome     Nephrotic syndrome     Pneumonia     Grief     Cigarette nicotine dependence without complication     HCOM with LVOT     Hyperkalemia     Past Surgical History:   Procedure Laterality Date     APPENDECTOMY       BLEPHAROPLASTY BILATERAL  9/18/2013    Procedure: BLEPHAROPLASTY BILATERAL;  BILATERAL UPPER EYELID BLEPHAROPLASTY ;  Surgeon: Olayinka Lyon MD;  Location:  [facility]     CATARACT IOL, RT/LT Bilateral 2016     CHOLECYSTECTOMY        COLONOSCOPY  7/15/2011    polyps repeat in 5 years     elected term pregnancy       HYSTEROSCOPIC PLACEMENT CONTRACEPTIVE DEVICE       KIDNEY SURGERY      donated left kideny     OVARY SURGERY      left for cyst benign     subclavian stent  2010     [facility]       Social History   Substance Use Topics     Smoking status: Current Every Day Smoker     Packs/day: 0.80     Years: 40.00     Types: Cigarettes     Last attempt to quit: 10/31/2016     Smokeless tobacco: Never Used     Alcohol use No     Family History   Problem Relation Age of Onset     DIABETES Mother      brother, MGM, sister     KIDNEY DISEASE Brother      X2 DM two      Alcohol/Drug Child      daughter     Asthma No family hx of      C.A.D. No family hx of      Hypertension No family hx of      CEREBROVASCULAR DISEASE No family hx of      Breast Cancer No family hx of      Cancer - colorectal No family hx of      Prostate Cancer No family hx of      Allergies No family hx of      Alzheimer Disease No family hx of      Anesthesia Reaction No family hx of      Arthritis No family hx of      Blood Disease No family hx of      CANCER No family hx of      Cardiovascular No family hx of      Circulatory No family hx of      Congenital Anomalies No family hx of      Connective Tissue Disorder No family hx of      Depression No family hx of      Eye Disorder No family hx of      Genetic Disorder No family hx of      GASTROINTESTINAL DISEASE No family hx of      Genitourinary Problems No family hx of      Gynecology No family hx of      HEART DISEASE No family hx of      Lipids No family hx of      Musculoskeletal Disorder No family hx of      Neurologic Disorder No family hx of      Obesity No family hx of      OSTEOPOROSIS No family hx of      Psychotic Disorder No family hx of      Respiratory No family hx of      Thyroid Disease No family hx of      Glaucoma No family hx of      Macular Degeneration No family hx of            Current  Outpatient Prescriptions   Medication Sig Dispense Refill     oxyCODONE (ROXICODONE) 10 MG IR tablet Take 1 tablet (10 mg) by mouth every 8 hours as needed 90 tablet 0     mometasone-formoterol (DULERA) 100-5 MCG/ACT oral inhaler Inhale 2 puffs into the lungs 2 times daily 1 Inhaler 1     tiotropium (SPIRIVA HANDIHALER) 18 MCG capsule Inhale contents of one capsule daily. 90 capsule 3     albuterol (PROAIR HFA/PROVENTIL HFA/VENTOLIN HFA) 108 (90 BASE) MCG/ACT Inhaler Inhale 2 puffs into the lungs every 6 hours as needed for shortness of breath / dyspnea or wheezing 18 g 3     furosemide (LASIX) 40 MG tablet Take 1 tablet (40 mg) by mouth daily 30 tablet 3     sodium bicarbonate 325 MG tablet Take 2 tablets (650 mg) by mouth 3 times daily 180 tablet 3     insulin glargine (LANTUS) 100 UNIT/ML injection Inject 10 Units Subcutaneous every morning 15 mL 3     hydrocortisone 1 % ointment Apply sparingly to affected area three times daily for 14 days. 30 g 0     order for DME Equipment being ordered: two pairs moderate knee high support hose 2 Device 1     atorvastatin (LIPITOR) 40 MG tablet Take 1 tablet (40 mg) by mouth daily 90 tablet 1     blood glucose monitoring (FREESTYLE) lancets Test BS four times daily as directed 1 Box 12     ferrous sulfate (IRON) 325 (65 FE) MG tablet Take 1 tablet (325 mg) by mouth daily (with breakfast) 100 tablet 1     blood glucose monitoring (FREESTYLE LITE) test strip TEST BLOOD SUGAR TWO TIMES A DAY 50 strip 12     levothyroxine (SYNTHROID/LEVOTHROID) 200 MCG tablet Take 1 tablet (200 mcg) by mouth See Admin Instructions New daily increase. Recheck labs in 8 weeks. 90 tablet 1     metoprolol (TOPROL-XL) 25 MG 24 hr tablet Take 1 tablet (25 mg) by mouth daily 90 tablet 1     nystatin (MYCOSTATIN) 481460 UNIT/GM POWD Apply 1 g topically 3 times daily as needed 30 g 1     polyethylene glycol (MIRALAX) powder Take 17 g (1 capful) by mouth daily 510 g 2     Alcohol Swabs (SM ALCOHOL PREP)  70 % PADS Externally apply 1 pad topically 4 times daily 100 each 11     order for DME One wheeled walker with seat and brakes and basket 1 Device 0     varenicline (CHANTIX STARTING MONTH PAK) 0.5 MG X 11 & 1 MG X 42 tablet Take 0.5 mg tab daily for 3 days, then 0.5 mg tab twice daily for 4 days, then 1 mg twice daily. 53 tablet 0     glucose-vitamin C (DEX4 GLUCOSE) 4-0.006 G CHEW Take 1 tablet (4 g) by mouth every hour as needed for low blood sugar 50 tablet 3     losartan (COZAAR) 100 MG tablet Take 1 tablet (100 mg) by mouth daily 90 tablet 1     Cholecalciferol (VITAMIN D) 2000 UNITS tablet Take 2,000 Units by mouth daily 60 tablet 2     docusate sodium (COLACE) 100 MG capsule Take 1 capsule (100 mg) by mouth 2 times daily 60 capsule 4     insulin pen needle 31G X 6 MM Use as directed 120 each 3     ASPIRIN LOW DOSE 81 MG EC tablet Take 1 tablet (81 mg) by mouth daily 30 tablet 11     nitroglycerin (NITROSTAT) 0.4 MG SL tablet Place 1 tablet (0.4 mg) under the tongue every 5 minutes as needed for chest pain 25 tablet 0     Emollient (EUCERIN CALMING DAILY MOIST) CREA Externally apply 1 dose * topically daily 1 Tube 12     Allergies   Allergen Reactions     Contrast Dye Hives and Itching     Clonidine      She had as IP and thinks it made her itchy     Diatrizoate Other (See Comments)     Diltiazem      Severe bradycardia     Hydralazine      [place] tab patient thought made her itchy so stopped     Iodine-131      Recent Labs   Lab Test  10/13/17   1034  09/18/17   1141   09/07/17   1135  05/10/17   1025   12/13/16   1036   11/03/16   0545   11/02/16   0622  11/01/16   2335   08/29/16   1652   12/20/13   1022   A1C   --    --    --   6.4*  6.6*   --   6.3*   --    --    --    --    --    < >  9.1*   < >  6.7*   LDL   --    --    --   81  96   --    --    --    --    --    --    --    --   85   < >  109   HDL   --    --    --   75  66   --    --    --    --    --    --    --    --    --    --   43*   TRIG   --     --    --   283*  303*   --    --    --    --    --    --    --    --    --    --   331*   ALT   --    --    --   18   --    --    --    --    --    --   21  27   --   23   < >   --    CR  3.07*  2.70*   < >  2.70*  2.25*   < >   --    < >  2.39*   < >  2.64*  2.53*   < >  2.17*   < >   --    GFRESTIMATED  15*  17*   < >  17*  21*   < >   --    < >  20*   < >  18*  19*   < >  22*   < >   --    GFRESTBLACK  18*  21*   < >  21*  26*   < >   --    < >  24*   < >  22*  23*   < >  27*   < >   --    POTASSIUM  4.5  4.3   < >  6.2*  5.5*   < >   --    < >  4.5   < >  5.6*  5.3   < >  5.2   < >   --    TSH   --    --    --   71.77*   --    --    --    --   1.28   --    --    --    < >  13.11*   < >   --     < > = values in this interval not displayed.      BP Readings from Last 3 Encounters:   10/13/17 174/70   09/28/17 167/73   09/08/17 180/70    Wt Readings from Last 3 Encounters:   10/13/17 177 lb 9.6 oz (80.6 kg)   09/28/17 186 lb 6.4 oz (84.6 kg)   09/08/17 184 lb 6.4 oz (83.6 kg)        Labs reviewed in [facility]  Problem list, Medication list, Allergies, and Medical/Social/Surgical histories reviewed in [facility] and updated as appropriate.     ROS: Constitutional, neuro, ENT, endocrine, pulmonary, cardiac, gastrointestinal, genitourinary, musculoskeletal, integument and psychiatric systems are negative, except as otherwise noted above in the HPI.   OBJECTIVE:                                                    /58  Pulse 95  Temp 98.3  F (36.8  C) (Oral)  Wt 177 lb (80.3 kg)  SpO2 97%  BMI 29.45 kg/m2  Body mass index is 29.45 kg/(m^2).  GENERAL: healthy, alert, well nourished, well hydrated, no distress  EYES: Eyes grossly normal to inspection, extraocular movements - intact, and PERRL  RESP: lungs clear to auscultation - no rales, no rhonchi, no wheezes  CV: regular rates and rhythm, normal S1 S2, no S3 or S4 and no murmur, no click or rub - no homans or cords  ABDOMEN: soft, no tenderness,  normal bowel  sounds  MS: extremities- no gross deformities noted, no edema  NEURO: strength and tone- normal, sensory exam- grossly normal, mentation- intact, speech- normal, reflexes- symmetric  Non focal no aphasia. No facial asymmetry.   BACK: CVA tenderness, no paralumbar tenderness    Mental Status Assessment:  Appearance:   Appropriate   Eye Contact:   Fair   Psychomotor Behavior: Normal   Attitude:   Cooperative  Guarded   Orientation:   All  Speech   Rate / Production: Normal    Volume:  Normal   Mood:    Normal  Affect:    Appropriate   Thought Content:  Clear   Thought Form:  Coherent  Logical   Insight:    Fair   Attention Span and Concentration:  limited  Recent and Remote Memory:  intact  Fund of Knowledge: appropriate  Muscle Strength and Tone: normal   Suicidal Ideation: reports no thoughts, no intention  Hallucination: No  Paranoid-No  Manic-No  Panic-No  Self harm-No    Diagnostic Test Results:  Results for orders placed or performed in visit on 10/20/17   **TSH with free T4 reflex FUTURE 1yr   Result Value Ref Range    TSH 1.04 0.40 - 4.00 mU/L        ASSESSMENT/PLAN:                                                    (E03.9) Acquired hypothyroidism  (primary encounter diagnosis)  Comment: loss of hair as noted above.   Plan: **TSH with free T4 reflex FUTURE 1yr          (M54.5,  G89.29) Chronic low back pain without sciatica, unspecified back pain laterality  Comment: Controlled with medication.   Plan: oxyCODONE (ROXICODONE) 10 MG IR tablet        Refill done.     (N18.4) CKD (chronic kidney disease) stage 4, GFR 15-29 ml/min (H)  Comment: Patient denies any concerns at this time. Reports that she is taking time to make decisions on the dialysis as discussed with nephrology.   Plan: Continue with current therapy.     (I10) Hypertension goal BP (blood pressure) < 140/90  Comment:   BP Readings from Last 3 Encounters:   10/20/17 140/58   10/13/17 174/70   09/28/17 167/73       Plan: Encourage medication  compliance.   -Continue with current therapies.       Patient Instructions   -Call clinic with any questions or concerns.     JUNIOR [facility] PRIMARY [facility]

## 2017-10-20 NOTE — MR AVS SNAPSHOT
After Visit Summary   10/20/2017    Kim Anne    MRN: [id number]           Patient Information     Date Of Birth          1945        Visit Information        Provider Department      10/20/2017 4:00 PM Ailyn Figueroa APRN [facility]        Today's Diagnoses     Acquired hypothyroidism    -  1      Care Instructions    -Call clinic with any questions or concerns.             Follow-ups after your visit        Your next 10 appointments already scheduled     Oct 25, 2017  6:30 AM CDT   LAB with GIOVANNA LAB   [facility] ([facility]    [address] [place] [id number] [phone]           Patient must bring picture ID. Patient should be prepared to give a urine specimen  Please do not eat 10-12 hours before your appointment if you are coming in fasting for labs on lipids, cholesterol, or glucose (sugar). Pregnant women should follow their Care Team instructions. Water with medications is okay. Do not drink coffee or other fluids. If you have concerns about taking  your medications, please ask at office or if scheduling via Vent[employer], send a message by clicking on Secure Messaging, Message Your Care Team.            Oct 25, 2017  7:50 AM CDT   Transplant Class-Kidney with GIOVANNA TRANSPLANT CLASS   [facility] ([facility])    [address] [place] [id number] [phone]            Oct 25, 2017  9:00 AM CDT   NUTRITION VISIT with Sasha Adams RD   [facility] ([facility])    [address] [place] [id number] [phone]            Oct 25, 2017  9:30 AM CDT   (Arrive by 9:15 AM)   Surgery Consult with GIOVANNA ORONA PATIENT 3   [facility] ([facility])    [address] [place] [id number]    [phone]            Oct 25, 2017 10:15 AM CDT   (Arrive by 10:00 AM)   SOT CARE COORDINATOR EVAL with Carlota Beckwith RN   [facility] Transplant ([facility])    [address] [place] [phone]   [phone]            Oct 25, 2017 10:30 AM CDT   Nephrology Consult with  PKE PATIENT 3   [facility] ([facility])    [address] [place] [address]   [phone]            Oct 25, 2017 11:15 AM CDT   (Arrive by 11:00 AM)   SOT SOCIAL WORK EVAL with ANA Dominguez   [facility] ([facility])    [address] [place] [phone]   [phone]            Oct 25, 2017  1:30 PM CDT   (Arrive by 1:15 PM)   XR CHEST 2 VIEWS with XR1   [facility] ([facility])    [address] [place] [id number] [phone]           Please bring a list of your current medicines to your exam. (Include vitamins, minerals and over-thecounter medicines.) Leave your valuables at home.  Tell your doctor if there is a chance you may be pregnant.  You do not need to do anything special for this exam.            Oct 25, 2017  2:00 PM CDT   (Arrive by 1:45 PM)   ecg with  CV EKG   [facility] ([facility])    [address] [id number] [phone]            Oct 25, 2017  3:00 PM CDT   Ech Complete with UCECHCR2    [facility] ([facility])    [address] [place] [phone]           1. Please bring or wear a comfortable two-piece outfit. 2. You may eat, drink and take your normal medicines. 3. For any questions that cannot be answered, please contact the ordering physician              Who to contact     If you  "have questions or need follow up information about today's clinic visit or your schedule please contact [facility] directly at [phone].  Normal or non-critical lab and imaging results will be communicated to you by MyChart, letter or phone within 4 business days after the clinic has received the results. If you do not hear from us within 7 days, please contact the clinic through [facility] [employer] or phone. If you have a critical or abnormal lab result, we will notify you by phone as soon as possible.  Submit refill requests through [facility] or call your pharmacy and they will forward the refill request to us. Please allow 3 business days for your refill to be completed.          Additional Information About Your Visit        [facility] My [employer] Information     Or[facility]os lets you send messages to your doctor, view your test results, renew your prescriptions, schedule appointments and more. To sign up, go to [email or web address] . Click on \"Log in\" on the left side of the screen, which will take you to the Welcome page. Then click on \"Sign up Now\" on the right side of the page.     You will be asked to enter the access code listed below, as well as some personal information. Please follow the directions to create your username and password.     Your access code is: [email or web address]  Expires: 2017  9:37 AM     Your access code will  in 90 days. If you need help or a new code, please call your [facility] clinic or [phone].        Care EveryWhere ID     This is your Care EveryWhere ID. This could be used by other organizations to access your [facility] medical records  [id number]        Your Vitals Were     Pulse Temperature Pulse Oximetry BMI (Body Mass Index)          95 98.3  F (36.8  C) (Oral) 97% 29.45 kg/m2         Blood Pressure from Last 3 Encounters:   10/20/17 140/58   10/13/17 174/70   17 167/73    Weight from Last 3 Encounters:   10/20/17 177 lb (80.3 kg)   10/13/17 177 lb 9.6 oz " (80.6 kg)   09/28/17 186 lb 6.4 oz (84.6 kg)              We Performed the Following     **TSH with free T4 reflex FUTURE 1yr        Primary Care Provider Office Phone # Fax #    JUNIOR Rodriguez [phone]       [address]  [facility] [address]        Equal Access to Services     [facility] : Hadii aad ku hadasho Soomaali, waaxda luqadaha, qaybta kaalmada adeegyada, waxay idiin hayaan adeeg kharash la'aan . So [facility] [phone].    ATENCIÓN: Si habla español, tiene a bailey disposición servicios gratuitos de asistencia lingüística. Llame al [phone].    We comply with applicable federal civil rights laws and [place] laws. We do not discriminate on the basis of race, color, national origin, age, disability, sex, sexual orientation, or gender identity.            Thank you!     Thank you for choosing [facility] [place] [facility]  for your care. Our goal is always to provide you with excellent care. Hearing back from our patients is one way we can continue to improve our services. Please take a few minutes to complete the written survey that you may receive in the mail after your visit with us. Thank you!             Your Updated Medication List - Protect others around you: Learn how to safely use, store and throw away your medicines at www.disposemymeds.org.          This list is accurate as of: 10/20/17  4:24 PM.  Always use your most recent med list.                   Brand Name Dispense Instructions for use Diagnosis    albuterol 108 (90 BASE) MCG/ACT Inhaler    PROAIR HFA/PROVENTIL HFA/VENTOLIN HFA    18 g    Inhale 2 puffs into the lungs every 6 hours as needed for shortness of breath / dyspnea or wheezing    Chronic obstructive pulmonary disease, unspecified COPD type (H)       ASPIRIN LOW DOSE 81 MG EC tablet   Generic drug:  aspirin     30 tablet    Take 1 tablet (81 mg) by mouth daily    History of MI (myocardial infarction), Essential hypertension,  benign       atorvastatin 40 MG tablet    LIPITOR    90 tablet    Take 1 tablet (40 mg) by mouth daily    Hyperlipidemia LDL goal <100       blood glucose monitoring lancets     1 Box    Test BS four times daily as directed    Type 2 diabetes mellitus without complication, with long-term current use of insulin (H)       blood glucose monitoring test strip    FREESTYLE LITE    50 strip    TEST BLOOD SUGAR TWO TIMES A DAY    Type 2 diabetes mellitus without complication, with long-term current use of insulin (H)       docusate sodium 100 MG capsule    COLACE    60 capsule    Take 1 capsule (100 mg) by mouth 2 times daily    Constipation, unspecified constipation type       EUCERIN CALMING DAILY MOIST Crea     1 Tube    Externally apply 1 dose * topically daily    Type II or unspecified type diabetes mellitus with neurological manifestations, not stated as uncontrolled(250.60) (H)       ferrous sulfate 325 (65 FE) MG tablet    IRON    100 tablet    Take 1 tablet (325 mg) by mouth daily (with breakfast)    Iron deficiency anemia, unspecified       furosemide 40 MG tablet    LASIX    30 tablet    Take 1 tablet (40 mg) by mouth daily    Hyperkalemia       glucose-vitamin C 4-0.006 G Chew    DEX4 GLUCOSE    50 tablet    Take 1 tablet (4 g) by mouth every hour as needed for low blood sugar    Controlled type 2 diabetes mellitus with stage 4 chronic kidney disease, with long-term current use of insulin (H)       hydrocortisone 1 % ointment     30 g    Apply sparingly to affected area three times daily for 14 days.    Rash       insulin glargine 100 UNIT/ML injection    LANTUS    15 mL    Inject 10 Units Subcutaneous every morning    Controlled type 2 diabetes mellitus with stage 4 chronic kidney disease, with long-term current use of insulin (H)       insulin pen needle 31G X 6 MM     120 each    Use as directed    Type 2 diabetes mellitus without complication (H)       levothyroxine 200 MCG tablet    SYNTHROID/LEVOTHROID     90 tablet    Take 1 tablet (200 mcg) by mouth See Admin Instructions New daily increase. Recheck labs in 8 weeks.    Other specified hypothyroidism       losartan 100 MG tablet    COZAAR    90 tablet    Take 1 tablet (100 mg) by mouth daily    Hypertension associated with diabetes (H)       metoprolol 25 MG 24 hr tablet    TOPROL-XL    90 tablet    Take 1 tablet (25 mg) by mouth daily    Hypertension associated with diabetes (H)       mometasone-formoterol 100-5 MCG/ACT oral inhaler    DULERA    1 Inhaler    Inhale 2 puffs into the lungs 2 times daily    COPD (chronic obstructive pulmonary disease) (H)       nitroGLYcerin 0.4 MG sublingual tablet    NITROSTAT    25 tablet    Place 1 tablet (0.4 mg) under the tongue every 5 minutes as needed for chest pain    History of MI (myocardial infarction)       nystatin 407269 UNIT/GM Powd    MYCOSTATIN    30 g    Apply 1 g topically 3 times daily as needed    Groin rash       * order for DME     1 Device    One wheeled walker with seat and brakes and basket    Risk for falls       * order for DME     2 Device    Equipment being ordered: two pairs moderate knee high support hose    Edema, unspecified type       oxyCODONE 10 MG IR tablet    ROXICODONE    90 tablet    Take 1 tablet (10 mg) by mouth every 8 hours as needed    Chronic low back pain without sciatica, unspecified back pain laterality       polyethylene glycol powder    MIRALAX    510 g    Take 17 g (1 capful) by mouth daily    Slow transit constipation       SM ALCOHOL PREP 70 % Pads     100 each    Externally apply 1 pad topically 4 times daily    Type 2 diabetes mellitus without complication, with long-term current use of insulin (H)       sodium bicarbonate 325 MG tablet     180 tablet    Take 2 tablets (650 mg) by mouth 3 times daily    Acidosis, CKD (chronic kidney disease) stage 4, GFR 15-29 ml/min (H)       tiotropium 18 MCG capsule    SPIRIVA HANDIHALER    90 capsule    Inhale contents of one capsule daily.     Pulmonary emphysema, unspecified emphysema type (H)       varenicline 0.5 MG X 11 & 1 MG X 42 tablet    CHANTIX STARTING MONTH GRETEL    53 tablet    Take 0.5 mg tab daily for 3 days, then 0.5 mg tab twice daily for 4 days, then 1 mg twice daily.    Encounter for smoking cessation counseling       vitamin D 2000 UNITS tablet     60 tablet    Take 2,000 Units by mouth daily    Vitamin D deficiency disease       * Notice:  This list has 2 medication(s) that are the same as other medications prescribed for you. Read the directions carefully, and ask your doctor or other care provider to review them with you.

## 2017-10-20 NOTE — PROGRESS NOTES
SUBJECTIVE/OBJECTIVE:                                                    Kim Anne is a 71 year old female coming in for a follow-up visit for Medication Therapy Management.  She was referred to me from Mai Babcock. PCP is now Ailyn Figueroa. Pt seen with PCP today.    Chief Complaint: HTN follow-up. Last MTM visit on 5/10/17.    Medication Adherence: history of med compliance issues - says that things have improved since she started using the pill boxes. Pt didn't take medications for about 1 week when she forgot them at an out-of-town relative's house. Had medications sent back to her and reports she is now taking them.    Hypothyroidism: Patient is taking levothyroxine 200 mcg daily. Patient is having the following symptoms: hair loss, depressed mood.    Smoking Cessation: Pt reports smoking 4-8 cigarettes per day. When she is craving a cigarette she tries to chew Red Hots candy or some gum. She has not started the Chantix and she doesn't want to.     Hypertension: Current medications include amlodipine 10mg daily, furosemide 40mg daily, metoprolol XL 25mg daily, losartan 100mg daily.  Patient does not self-monitor BP.  Patient reports no current medication side effects. She did have a cigarette before today's appointment.     Current labs include:  BP Readings from Last 3 Encounters:   10/20/17 140/58   10/13/17 174/70   09/28/17 167/73     Lab Results   Component Value Date    A1C 6.4 09/07/2017   .  Lab Results   Component Value Date    CHOL 213 09/07/2017     Lab Results   Component Value Date    TRIG 283 09/07/2017     Lab Results   Component Value Date    HDL 75 09/07/2017     Lab Results   Component Value Date    LDL 81 09/07/2017       Liver Function Studies -   Recent Labs   Lab Test  09/07/17   1135   PROTTOTAL  5.7*   ALBUMIN  1.9*   BILITOTAL  0.2   ALKPHOS  158*   AST  19   ALT  18     Lab Results   Component Value Date    UCRR 54 10/13/2017    MICROL 5320 07/07/2017    UMALCR  6325.80 (H) 07/07/2017     Last Basic Metabolic Panel:  Lab Results   Component Value Date     09/18/2017      Lab Results   Component Value Date    POTASSIUM 4.3 09/18/2017     Lab Results   Component Value Date    CHLORIDE 114 09/18/2017     Lab Results   Component Value Date    BUN 34 09/18/2017     Lab Results   Component Value Date    CR 2.70 09/18/2017     GFR Estimate   Date Value Ref Range Status   10/13/2017 15 (L) >60 mL/min/1.7m2 Final     Comment:     Non  GFR Calc   09/18/2017 17 (L) >60 mL/min/1.7m2 Final     Comment:     Non  GFR Calc   09/11/2017 17 (L) >60 mL/min/1.7m2 Final     Comment:     Non  GFR Calc     GFR Estimate If Black   Date Value Ref Range Status   10/13/2017 18 (L) >60 mL/min/1.7m2 Final     Comment:      GFR Calc   09/18/2017 21 (L) >60 mL/min/1.7m2 Final     Comment:      GFR Calc   09/11/2017 20 (L) >60 mL/min/1.7m2 Final     Comment:      GFR Calc     TSH   Date Value Ref Range Status   09/07/2017 71.77 (H) 0.40 - 4.00 mU/L Final     Most Recent Immunizations   Administered Date(s) Administered     HepB 06/28/2012     Influenza (High Dose) 3 valent vaccine 09/28/2017     Influenza (IIV3) 11/04/2014     Pneumococcal (PCV 13) 01/05/2015     Pneumococcal 23 valent 04/01/2011     TD (ADULT, 7+) 07/12/2007     TDAP Vaccine (Adacel) 05/18/2011     Twinrix A/B 03/21/2012     Zoster vaccine, live 04/30/2012     ASSESSMENT:                                                    Current medications were reviewed with her today.      Medication Adherence: Improving with use of pill box.      Hypothyroid: Needs improvement. Pt now worried about hair loss. Could be due to hypothyroid, though is more likely due to increasing age. Should check TSH today.     Smoking Cessation: Improving. Encouraged continued smoking cessation.     Hypertension: Needs Improvement. Patient is not meeting BP goal of <  140/90mmHg, though is borderline and she had a cigarette before today's visit. Continue to stress smoking cessation.     PLAN:                                                      1. Encouraged continued smoking cessation.   2. TSH recheck today.    I spent 30 minutes with this patient today.  All changes were made via collaborative practice agreement with Ailyn Figueroa. A copy of the visit note was provided to the patient's primary care provider.     Will follow up in 2 weeks.     The patient was given a summary of these recommendations as an after visit summary.    Pratibha Barth, PharmD  Medication Therapy Management Pharmacist  Pager: [phone]

## 2017-10-20 NOTE — NURSING NOTE
"Chief Complaint   Patient presents with     Diabetes     Pain       Initial /58  Pulse 95  Temp 98.3  F (36.8  C) (Oral)  Wt 177 lb (80.3 kg)  SpO2 97%  BMI 29.45 kg/m2 Estimated body mass index is 29.45 kg/(m^2) as calculated from the following:    Height as of 10/13/17: 5' 5\" (1.651 m).    Weight as of this encounter: 177 lb (80.3 kg).  Medication Reconciliation: complete   Amy Neves MA      "

## 2017-10-25 ENCOUNTER — ALLIED HEALTH/NURSE VISIT (OUTPATIENT)
Dept: TRANSPLANT | Facility: CLINIC | Age: 72
End: 2017-10-25
Attending: TRANSPLANT SURGERY
Payer: MEDICARE

## 2017-10-25 ENCOUNTER — RESULTS ONLY (OUTPATIENT)
Dept: OTHER | Facility: CLINIC | Age: 72
End: 2017-10-25

## 2017-10-25 ENCOUNTER — ALLIED HEALTH/NURSE VISIT (OUTPATIENT)
Dept: TRANSPLANT | Facility: CLINIC | Age: 72
End: 2017-10-25
Attending: INTERNAL MEDICINE
Payer: MEDICARE

## 2017-10-25 ENCOUNTER — RADIANT APPOINTMENT (OUTPATIENT)
Dept: CARDIOLOGY | Facility: CLINIC | Age: 72
End: 2017-10-25
Attending: PHYSICIAN ASSISTANT

## 2017-10-25 VITALS
BODY MASS INDEX: 29.59 KG/M2 | SYSTOLIC BLOOD PRESSURE: 179 MMHG | HEART RATE: 91 BPM | HEIGHT: 65 IN | OXYGEN SATURATION: 100 % | WEIGHT: 177.6 LBS | DIASTOLIC BLOOD PRESSURE: 77 MMHG | RESPIRATION RATE: 20 BRPM | TEMPERATURE: 97.7 F

## 2017-10-25 DIAGNOSIS — Z01.818 ENCOUNTER FOR PRE-TRANSPLANT EVALUATION FOR KIDNEY TRANSPLANT: Primary | ICD-10-CM

## 2017-10-25 DIAGNOSIS — N18.4 CHRONIC RENAL FAILURE, STAGE 4 (SEVERE) (H): ICD-10-CM

## 2017-10-25 DIAGNOSIS — N18.4 CKD (CHRONIC KIDNEY DISEASE) STAGE 4, GFR 15-29 ML/MIN (H): Primary | ICD-10-CM

## 2017-10-25 DIAGNOSIS — I10 ESSENTIAL HYPERTENSION: ICD-10-CM

## 2017-10-25 DIAGNOSIS — J98.4 DISEASE OF LUNG: ICD-10-CM

## 2017-10-25 DIAGNOSIS — Z87.891 HISTORY OF TOBACCO USE: ICD-10-CM

## 2017-10-25 DIAGNOSIS — Z76.82 ORGAN TRANSPLANT CANDIDATE: ICD-10-CM

## 2017-10-25 DIAGNOSIS — Z76.82 AWAITING ORGAN TRANSPLANT STATUS: Primary | ICD-10-CM

## 2017-10-25 DIAGNOSIS — I10 HYPERTENSION GOAL BP (BLOOD PRESSURE) < 140/90: ICD-10-CM

## 2017-10-25 DIAGNOSIS — I73.9 PVD (PERIPHERAL VASCULAR DISEASE) (H): ICD-10-CM

## 2017-10-25 DIAGNOSIS — F17.200 SMOKER: ICD-10-CM

## 2017-10-25 DIAGNOSIS — N18.6 ESRD (END STAGE RENAL DISEASE) (H): Primary | ICD-10-CM

## 2017-10-25 DIAGNOSIS — E11.9 DIABETES MELLITUS, TYPE 2 (H): ICD-10-CM

## 2017-10-25 DIAGNOSIS — Z76.82 ORGAN TRANSPLANT CANDIDATE: Primary | ICD-10-CM

## 2017-10-25 DIAGNOSIS — Z90.5 SINGLE KIDNEY: ICD-10-CM

## 2017-10-25 DIAGNOSIS — N18.4 CHRONIC KIDNEY DISEASE, STAGE 4 (SEVERE) (H): ICD-10-CM

## 2017-10-25 LAB
ABO + RH BLD: NORMAL
ABO + RH BLD: NORMAL
ALBUMIN SERPL-MCNC: 1.9 G/DL (ref 3.4–5)
ALBUMIN UR-MCNC: >499 MG/DL
ALP SERPL-CCNC: 164 U/L (ref 40–150)
ALT SERPL W P-5'-P-CCNC: 16 U/L (ref 0–50)
ANION GAP SERPL CALCULATED.3IONS-SCNC: 7 MMOL/L (ref 3–14)
APPEARANCE UR: ABNORMAL
APTT PPP: 30 SEC (ref 22–37)
AST SERPL W P-5'-P-CCNC: 13 U/L (ref 0–45)
BASOPHILS # BLD AUTO: 0.1 10E9/L (ref 0–0.2)
BASOPHILS NFR BLD AUTO: 1.1 %
BILIRUB SERPL-MCNC: 0.2 MG/DL (ref 0.2–1.3)
BILIRUB UR QL STRIP: NEGATIVE
BLD GP AB SCN SERPL QL: NORMAL
BLD GP AB SCN TITR SERPL: NORMAL {TITER}
BLOOD BANK CMNT PATIENT-IMP: NORMAL
BUN SERPL-MCNC: 28 MG/DL (ref 7–30)
C PEPTIDE SERPL-MCNC: 2.8 NG/ML (ref 0.9–6.9)
CALCIUM SERPL-MCNC: 7.7 MG/DL (ref 8.5–10.1)
CARDIOLIPIN ANTIBODY IGG: <1.6 GPL-U/ML (ref 0–19.9)
CARDIOLIPIN ANTIBODY IGM: 0.3 MPL-U/ML (ref 0–19.9)
CHLORIDE SERPL-SCNC: 117 MMOL/L (ref 94–109)
CHOLEST SERPL-MCNC: 214 MG/DL
CMV IGG SERPL QL IA: >8 AI (ref 0–0.8)
CO2 SERPL-SCNC: 20 MMOL/L (ref 20–32)
COLOR UR AUTO: YELLOW
CREAT SERPL-MCNC: 2.77 MG/DL (ref 0.52–1.04)
DIFFERENTIAL METHOD BLD: ABNORMAL
EBV VCA IGG SER QL IA: >8 AI (ref 0–0.8)
EOSINOPHIL # BLD AUTO: 0.2 10E9/L (ref 0–0.7)
EOSINOPHIL NFR BLD AUTO: 2.3 %
ERYTHROCYTE [DISTWIDTH] IN BLOOD BY AUTOMATED COUNT: 14.1 % (ref 10–15)
GFR SERPL CREATININE-BSD FRML MDRD: 17 ML/MIN/1.7M2
GLUCOSE SERPL-MCNC: 112 MG/DL (ref 70–99)
GLUCOSE UR STRIP-MCNC: 150 MG/DL
HBA1C MFR BLD: 6.6 % (ref 4.3–6)
HBV CORE AB SERPL QL IA: NONREACTIVE
HBV SURFACE AB SERPL IA-ACNC: 65.86 M[IU]/ML
HBV SURFACE AG SERPL QL IA: NONREACTIVE
HCT VFR BLD AUTO: 38.8 % (ref 35–47)
HCV AB SERPL QL IA: NONREACTIVE
HDLC SERPL-MCNC: 67 MG/DL
HGB BLD-MCNC: 11.8 G/DL (ref 11.7–15.7)
HGB UR QL STRIP: NEGATIVE
HIV 1+2 AB+HIV1 P24 AG SERPL QL IA: NONREACTIVE
IMM GRANULOCYTES # BLD: 0 10E9/L (ref 0–0.4)
IMM GRANULOCYTES NFR BLD: 0.4 %
INR PPP: 0.84 (ref 0.86–1.14)
KETONES UR STRIP-MCNC: NEGATIVE MG/DL
LDLC SERPL CALC-MCNC: 104 MG/DL
LEUKOCYTE ESTERASE UR QL STRIP: NEGATIVE
LYMPHOCYTES # BLD AUTO: 1.2 10E9/L (ref 0.8–5.3)
LYMPHOCYTES NFR BLD AUTO: 16.9 %
MCH RBC QN AUTO: 28.2 PG (ref 26.5–33)
MCHC RBC AUTO-ENTMCNC: 30.4 G/DL (ref 31.5–36.5)
MCV RBC AUTO: 93 FL (ref 78–100)
MONOCYTES # BLD AUTO: 0.4 10E9/L (ref 0–1.3)
MONOCYTES NFR BLD AUTO: 5.4 %
NEUTROPHILS # BLD AUTO: 5.4 10E9/L (ref 1.6–8.3)
NEUTROPHILS NFR BLD AUTO: 73.9 %
NITRATE UR QL: NEGATIVE
NONHDLC SERPL-MCNC: 147 MG/DL
NRBC # BLD AUTO: 0 10*3/UL
NRBC BLD AUTO-RTO: 0 /100
PH UR STRIP: 6 PH (ref 5–7)
PHOSPHATE SERPL-MCNC: 3.2 MG/DL (ref 2.5–4.5)
PLATELET # BLD AUTO: 317 10E9/L (ref 150–450)
POTASSIUM SERPL-SCNC: 4.3 MMOL/L (ref 3.4–5.3)
PROT SERPL-MCNC: 5.8 G/DL (ref 6.8–8.8)
RBC # BLD AUTO: 4.19 10E12/L (ref 3.8–5.2)
RBC #/AREA URNS AUTO: 1 /HPF (ref 0–2)
SODIUM SERPL-SCNC: 144 MMOL/L (ref 133–144)
SOURCE: ABNORMAL
SP GR UR STRIP: 1.02 (ref 1–1.03)
SPECIMEN EXP DATE BLD: NORMAL
SQUAMOUS #/AREA URNS AUTO: 3 /HPF (ref 0–1)
T PALLIDUM IGG+IGM SER QL: NEGATIVE
THROMBIN TIME: 17.8 SEC (ref 13–19)
TRIGL SERPL-MCNC: 212 MG/DL
UROBILINOGEN UR STRIP-MCNC: 0 MG/DL (ref 0–2)
VZV IGG SER QL IA: 2.2 AI (ref 0–0.8)
WBC # BLD AUTO: 7.3 10E9/L (ref 4–11)
WBC #/AREA URNS AUTO: 3 /HPF (ref 0–2)

## 2017-10-25 PROCEDURE — 85610 PROTHROMBIN TIME: CPT | Performed by: PHYSICIAN ASSISTANT

## 2017-10-25 PROCEDURE — 40000866 ZZHCL STATISTIC HIV 1/2 ANTIGEN/ANTIBODY PRETRANSPLANT ONLY: Performed by: PHYSICIAN ASSISTANT

## 2017-10-25 PROCEDURE — 86147 CARDIOLIPIN ANTIBODY EA IG: CPT | Performed by: PHYSICIAN ASSISTANT

## 2017-10-25 PROCEDURE — 86900 BLOOD TYPING SEROLOGIC ABO: CPT | Performed by: PHYSICIAN ASSISTANT

## 2017-10-25 PROCEDURE — 86901 BLOOD TYPING SEROLOGIC RH(D): CPT | Performed by: PHYSICIAN ASSISTANT

## 2017-10-25 PROCEDURE — 85613 RUSSELL VIPER VENOM DILUTED: CPT | Performed by: PHYSICIAN ASSISTANT

## 2017-10-25 PROCEDURE — 81240 F2 GENE: CPT | Performed by: PHYSICIAN ASSISTANT

## 2017-10-25 PROCEDURE — 80053 COMPREHEN METABOLIC PANEL: CPT | Performed by: PHYSICIAN ASSISTANT

## 2017-10-25 PROCEDURE — 81001 URINALYSIS AUTO W/SCOPE: CPT | Performed by: PHYSICIAN ASSISTANT

## 2017-10-25 PROCEDURE — 86780 TREPONEMA PALLIDUM: CPT | Performed by: PHYSICIAN ASSISTANT

## 2017-10-25 PROCEDURE — 84681 ASSAY OF C-PEPTIDE: CPT | Performed by: PHYSICIAN ASSISTANT

## 2017-10-25 PROCEDURE — 85670 THROMBIN TIME PLASMA: CPT | Performed by: PHYSICIAN ASSISTANT

## 2017-10-25 PROCEDURE — 86704 HEP B CORE ANTIBODY TOTAL: CPT | Performed by: PHYSICIAN ASSISTANT

## 2017-10-25 PROCEDURE — 81370 HLA I & II TYPING LR: CPT | Performed by: PHYSICIAN ASSISTANT

## 2017-10-25 PROCEDURE — 85025 COMPLETE CBC W/AUTO DIFF WBC: CPT | Performed by: PHYSICIAN ASSISTANT

## 2017-10-25 PROCEDURE — 86480 TB TEST CELL IMMUN MEASURE: CPT | Performed by: PHYSICIAN ASSISTANT

## 2017-10-25 PROCEDURE — 86886 COOMBS TEST INDIRECT TITER: CPT | Performed by: PHYSICIAN ASSISTANT

## 2017-10-25 PROCEDURE — 40000724 ZZH UMP OPEN ENCOUNTER >70 DAYS

## 2017-10-25 PROCEDURE — 83036 HEMOGLOBIN GLYCOSYLATED A1C: CPT | Performed by: PHYSICIAN ASSISTANT

## 2017-10-25 PROCEDURE — 86665 EPSTEIN-BARR CAPSID VCA: CPT | Performed by: PHYSICIAN ASSISTANT

## 2017-10-25 PROCEDURE — 81241 F5 GENE: CPT | Performed by: PHYSICIAN ASSISTANT

## 2017-10-25 PROCEDURE — 86706 HEP B SURFACE ANTIBODY: CPT | Performed by: PHYSICIAN ASSISTANT

## 2017-10-25 PROCEDURE — 86850 RBC ANTIBODY SCREEN: CPT | Performed by: PHYSICIAN ASSISTANT

## 2017-10-25 PROCEDURE — 84100 ASSAY OF PHOSPHORUS: CPT | Performed by: PHYSICIAN ASSISTANT

## 2017-10-25 PROCEDURE — 86787 VARICELLA-ZOSTER ANTIBODY: CPT | Performed by: PHYSICIAN ASSISTANT

## 2017-10-25 PROCEDURE — 86832 HLA CLASS I HIGH DEFIN QUAL: CPT | Performed by: PHYSICIAN ASSISTANT

## 2017-10-25 PROCEDURE — 86833 HLA CLASS II HIGH DEFIN QUAL: CPT | Performed by: PHYSICIAN ASSISTANT

## 2017-10-25 PROCEDURE — 85730 THROMBOPLASTIN TIME PARTIAL: CPT | Mod: 91 | Performed by: PHYSICIAN ASSISTANT

## 2017-10-25 PROCEDURE — 36415 COLL VENOUS BLD VENIPUNCTURE: CPT | Performed by: PHYSICIAN ASSISTANT

## 2017-10-25 PROCEDURE — G0472 HEP C SCREEN HIGH RISK/OTHER: HCPCS | Performed by: PHYSICIAN ASSISTANT

## 2017-10-25 PROCEDURE — 86644 CMV ANTIBODY: CPT | Performed by: PHYSICIAN ASSISTANT

## 2017-10-25 PROCEDURE — 85730 THROMBOPLASTIN TIME PARTIAL: CPT | Performed by: PHYSICIAN ASSISTANT

## 2017-10-25 PROCEDURE — 80061 LIPID PANEL: CPT | Performed by: PHYSICIAN ASSISTANT

## 2017-10-25 PROCEDURE — 81376 HLA II TYPING 1 LOCUS LR: CPT | Performed by: PHYSICIAN ASSISTANT

## 2017-10-25 PROCEDURE — G0499 HEPB SCREEN HIGH RISK INDIV: HCPCS | Performed by: PHYSICIAN ASSISTANT

## 2017-10-25 RX ORDER — AMLODIPINE BESYLATE 5 MG/1
5 TABLET ORAL DAILY
Qty: 30 TABLET | Refills: 3 | Status: SHIPPED | OUTPATIENT
Start: 2017-10-25 | End: 2017-10-26

## 2017-10-25 ASSESSMENT — PAIN SCALES - GENERAL: PAINLEVEL: SEVERE PAIN (6)

## 2017-10-25 NOTE — PROGRESS NOTES
RE: Kim Anne    [facility]# [id number]        I saw your patient, Kim Anne, in consultation in our pretransplant clinic.  She was at clinic to discuss care for her end-stage renal disease.  She was at clinic with a friend.  Prior to clinic, they attended our pretransplant class.  Of note, she had donated a kidney to her brother in . She tells me his primary disease was diabetes. She, herself, now has diabetes.      We talked about the pros and cons of transplantation vs. dialysis.  We discussed the fact that it was important that she think about the pros and cons of each treatment option and make an active decision.  We also discussed the fact that the two were interconnected and she may need to go on dialysis before transplant (if she chose to have a transplant) and that if the transplant failed, she might need dialysis before another transplant.       We also discussed the fact that if she chose to have a transplant, she would need to decide between going on the wait list for a  donor transplant vs. having a living donor transplant.  We talked about the pros and cons of each option.  Although I didn't express an opinion regarding transplantation or dialysis, I suggested that if she chose to have a transplant, a living donor transplant would be preferable in that the surgery is the same, the immunosuppressive drugs and the risks are the same, but the transplant could be done sooner and the results are better.  I told her that the wait for  donor kidney was approximately five years for patients who are newly put on the waiting list.  In addition, we talked about the fact that the disadvantage of a living donor transplant was the risk to the donor.       Because she was interested in living donation, we spent some time discussing donor risks, including the risks of mortality, morbidity, and long-term risks with a single kidney.     I attempted to answer any remaining questions.   I also told her that should she have any questions, she should feel free to contact us.  We would be glad to answer any questions either over the phone or at another clinic visit.  Her transplant coordinator is Carlota Beckwith and may be reached at [phone].  Thank you for the opportunity to see her.     I spent 25 minutes with this patient.  Over 90% of that time was spent in counseling and coordination of care.             Yours truly,               Lexa Garzon MD         Professor of Surgery         ([phone])    MILKA/

## 2017-10-25 NOTE — PROGRESS NOTES
NUTRITION KIDNEY TRANSPLANT EVALUATION  Medical Nutrition Therapy    Weight and BMI:  Current BMI: 29.5  BMI is within criteria of <35 for kidney transplant       Visit Type: Initial Assessment    Kim Anne referred by Dr. Garzon for MNT related to kidney transplant evaluation    Patient accompanied by her cousin, Medina     H/o previous txp: no previous txp hx, however, donated a kidney in 1988    Nutrition Assessment:  Anthropometrics  Height:   65 in   BMI:    29.5    Weight Status:Overweight BMI 25-29.9   Weight:  177 lbs            IBW (lb): 125  % IBW: 142    Wt Hx: Pt has intentionally lost weight by eating less. She was in the 200s for UBW. Current weight stable for a few months now. Of note, weight during CKD diet education (6/2014) of 218 lbs.     Adj/dosing BW: 138 lbs/63 kg       Recent Labs   Lab Test  10/25/17   0639  09/07/17   1135   12/20/13   1022   10/19/11   1111   CHOL  214*  213*   < >  219*   --   169   HDL  67  75   < >  43*   --   49*   LDL  104*  81   < >  109   < >  85   TRIG  212*  283*   < >  331*   --   175*   CHOLHDLRATIO   --    --    --   5.0   --   3.0    < > = values in this interval not displayed.     Lab Results   Component Value Date    A1C 6.4 09/07/2017    A1C 6.6 05/10/2017    A1C 6.3 12/13/2016    A1C 7.2 10/11/2016    A1C 9.1 08/29/2016     Potassium   Date Value Ref Range Status   10/25/2017 4.3 3.4 - 5.3 mmol/L Final     Phosphorus: 3.2 today       Vitamins, Supplements, Pertinent Meds: iron, vitamin D  Herbal Medicines/Supplements: none    Dialysis Modality: None    Nutrition History  Pt-reported special diets/eating habits: none   Dining out/food not made at home: <1x/month   Appetite: good/baseline     Pt cooks for self. She recently switched to sea salt but one of her MDs told her that it is still salt. She plans now to switch to Mrs Graham.     Recall:  B: 2 boiled eggs, grits, cereal, oats   L: none  D: spaghetti and meatballs   Sn: cookies   Beverages: water,  seldom soda, 8-16 ounces lemonade, coffee, tea, 8-16 ounces milk, Gatorade (not daily)  ETOH (1 drink = 12 oz beer, 5 oz wine, 1.5 oz liquor): none     Physical Activity  Walking around house and casino      MALNUTRITION  % Intake:  Decreased intake does not meet criteria for malnutrition - intentional   % Weight Loss:  Weight loss does not meet criteria for malnutrition - intentional   Subcutaneous Fat Loss:  Does not meet criteria  Muscle Loss:  Does not meet criteria  Fluid Accumulation/Edema:  Unknown   Malnutrition Diagnosis: Patient does not meet two of the above criteria necessary for diagnosing malnutrition     Nutrition Prescription  Energy:  1575     (25 kcal/kg for maintenance)     Protein:  38-50  (0.6-0.8 g/kg for CKD)           Fluid:  1 ml/kcal or per MD        Micronutrient:  Na+: <2000 mg/day  K+: 3567-2419 mg/day  Phos: 800-1000 mg/day          Nutrition Diagnosis:  Food and nutrition related knowledge deficit r/t pre kidney transplant eval AEB pt verbalized not hearing pre/post transplant diet guidelines.    Nutrition Intervention:  Nutrition education provided:  Discussed sodium intake (low sodium foods and drinks, seasoning food without salt and tips for low sodium diet). Encouraged pt to utilize Mrs Dash, garlic/onion powders, etc and to purchase low sodium canned items if available. She reports buying frozen veggies instead of canned and reducing frequency of canned soup consumption.     Reviewed post txp diet guidelines in brief (will review in further detail post txp):   (1) Review of proper food safety measures d/t immunosuppressant therapy post-op and increased risk for food-borne illness (2) Stressed importance of not taking any herbal/Chinese/alternative medicines or supplements post txp (d/t risk for rejection, unknown effects on the organs, potential interactions with immunosuppresants). (3) Med regimen and possible side effects    Patient Understanding: Pt verbalized understanding of  education provided.  Expected Compliance: Good  Follow-Up Plans: PRN     Nutrition Goals:  Limit Na+ <2000mg/day    Provided pt with contact info.   Time spent with patient: 15 minutes.  Sasha Adams RD, LD

## 2017-10-25 NOTE — LETTER
10/25/2017     RE: Kim Anne  [address]  [facility] [address]     Dear Colleague,    Thank you for referring your patient, Kim Anne, to the [facility] SOLID ORGAN TRANSPLANT at [facility]. Please see a copy of my visit note below.    RE: Kim Anne    [facility]# [id number]        I saw your patient, Kim Anne, in consultation in our pretransplant clinic.  She was at clinic to discuss care for her end-stage renal disease.  She was at clinic with a friend.  Prior to clinic, they attended our pretransplant class.  Of note, she had donated a kidney to her brother in . She tells me his primary disease was diabetes. She, herself, now has diabetes.      We talked about the pros and cons of transplantation vs. dialysis.  We discussed the fact that it was important that she think about the pros and cons of each treatment option and make an active decision.  We also discussed the fact that the two were interconnected and she may need to go on dialysis before transplant (if she chose to have a transplant) and that if the transplant failed, she might need dialysis before another transplant.       We also discussed the fact that if she chose to have a transplant, she would need to decide between going on the wait list for a  donor transplant vs. having a living donor transplant.  We talked about the pros and cons of each option.  Although I didn't express an opinion regarding transplantation or dialysis, I suggested that if she chose to have a transplant, a living donor transplant would be preferable in that the surgery is the same, the immunosuppressive drugs and the risks are the same, but the transplant could be done sooner and the results are better.  I told her that the wait for  donor kidney was approximately five years for patients who are newly put on the waiting list.  In addition, we talked about the fact that the  disadvantage of a living donor transplant was the risk to the donor.       Because she was interested in living donation, we spent some time discussing donor risks, including the risks of mortality, morbidity, and long-term risks with a single kidney.     I attempted to answer any remaining questions.  I also told her that should she have any questions, she should feel free to contact us.  We would be glad to answer any questions either over the phone or at another clinic visit.  Her transplant coordinator is Carlota Beckwith and may be reached at [phone].  Thank you for the opportunity to see her.     I spent 25 minutes with this patient.  Over 90% of that time was spent in counseling and coordination of care.             Yours truly,               Lexa Garzon MD         Professor of Surgery         ([phone])    MILKA/

## 2017-10-25 NOTE — PROGRESS NOTES
Patient attended the Pre Kidney/Pancreas Transplant Education Class today with her cousin. All were very attentive and engaged throughout the class and asked few questions. I introduced the [facility] website and encouraged them to access it for further education. In addition to the standard video content. I reviewed living donation paired exchange, KDPI, typical length of stay and the need to stay locally post transplant discharge.

## 2017-10-25 NOTE — MR AVS SNAPSHOT
After Visit Summary   10/25/2017    Kim Anne    MRN: [id number]           Patient Information     Date Of Birth          1945        Visit Information        Provider Department      10/25/2017 9:00 AM Sasha Adams RD [facility] Solid Organ Transplant        Today's Diagnoses     Organ transplant candidate    -  1       Follow-ups after your visit        Your next 10 appointments already scheduled     Oct 25, 2017 10:15 AM CDT   (Arrive by 10:00 AM)   SOT CARE COORDINATOR EVAL with Carlota Beckwith RN   [facility] Solid Organ Transplant ([facility])    [address] [place] [address]   [phone]            Oct 25, 2017 10:30 AM CDT   Nephrology Consult with GIOVANNA ZAPATAE PATIENT 3   [facility] Solid Organ Transplant ([facility])    [address] [place] [address]   [phone]            Oct 25, 2017 11:15 AM CDT   (Arrive by 11:00 AM)   SOT SOCIAL WORK EVAL with ANA Dominguez   [facility] Solid Organ Transplant ([facility])    [address] [place] [address]   [phone]            Oct 25, 2017  1:30 PM CDT   (Arrive by 1:15 PM)   XR CHEST 2 VIEWS with UCXR1   [facility] ([facility])    [address] [place] [id number] [phone]           Please bring a list of your current medicines to your exam. (Include vitamins, minerals and over-thecounter medicines.) Leave your valuables at home.  Tell your doctor if there is a chance you may be pregnant.  You do not need to do anything special for this exam.            Oct 25, 2017  2:00 PM CDT   (Arrive by 1:45 PM)   ecg with  CV EKG   [facility] ([facility])    [address] [place] [id number] [phone]            Oct 25, 2017   3:00 PM CDT   Ech Complete with UCECHCR2   [facility] Echo ([facility])    [address] [place] [address] [phone]           1. Please bring or wear a comfortable two-piece outfit. 2. You may eat, drink and take your normal medicines. 3. For any questions that cannot be answered, please contact the ordering physician            Nov 06, 2017 10:15 AM CST   LAB with  LAB   [facility] Lab ([facility])    [address] [place] [address] [phone]           Patient must bring picture ID. Patient should be prepared to give a urine specimen  Please do not eat 10-12 hours before your appointment if you are coming in fasting for labs on lipids, cholesterol, or glucose (sugar). Pregnant women should follow their Care Team instructions. Water with medications is okay. Do not drink coffee or other fluids. If you have concerns about taking  your medications, please ask at office or if scheduling via [facility], send a message by clicking on Secure Messaging, Message Your Care Team.            Nov 06, 2017 10:30 AM CST   US AORTIC IMAGING with UCUSV2   [facility] US ([facility])    [address] [place] [address] [phone]           Please bring a list of your medicines (including vitamins, minerals and over-the-counter drugs). Also, tell your doctor about any allergies you may have. Wear comfortable clothes and leave your valuables at home.  Adults: No eating or drinking for 8 hours before the exam. You may take medicine with a small sip of water.  Children: - Children 6+ years: No food or drink for 6 hours before exam. - Children 1-5 years: No food or drink for 4 hours before exam. - Infants, breast-fed: may have breast milk up to 2 hours before exam. - Infants, formula: may have bottle until 4 hours before exam.  Please call the Imaging Department  at your exam site with any questions.            Nov 06, 2017 11:00 AM CST   US AORTA/IVC/ILIAC DUPLEX COMPLETE with UCUSV2   [facility] US ([facility] [place])    [address] [place] [address] [phone]           Please bring a list of your medicines (including vitamins, minerals and over-the-counter drugs). Also, tell your doctor about any allergies you may have. Wear comfortable clothes and leave your valuables at home.  Adults: No eating or drinking for 8 hours before the exam. You may take medicine with a small sip of water.  Children: - Children 6+ years: No food or drink for 6 hours before exam. - Children 1-5 years: No food or drink for 4 hours before exam. - Infants, breast-fed: may have breast milk up to 2 hours before exam. - Infants, formula: may have bottle until 4 hours before exam.  Please call the Imaging Department at your exam site with any questions.            Nov 06, 2017  2:30 PM CST   (Arrive by 2:15 PM)   NEW PANCREAS/KIDNEY TRANSPLANT WORK-UP with Kunal Nj MD   [facility] ([facility])    [address] [place] [id number] [phone]              Who to contact     If you have questions or need follow up information about today's clinic visit or your schedule please contact [facility] SOLID ORGAN TRANSPLANT directly at [phone].  Normal or non-critical lab and imaging results will be communicated to you by MyChart, letter or phone within 4 business days after the clinic has received the results. If you do not hear from us within 7 days, please contact the clinic through MyChart or phone. If you have a critical or abnormal lab result, we will notify you by phone as soon as possible.  Submit refill requests through [facility] or call your pharmacy and they will forward the refill request to us. Please allow 3 business days for your refill to be completed.           "Additional Information About Your Visit        MyChart Information     [employer] lets you send messages to your doctor, view your test results, renew your prescriptions, schedule appointments and more. To sign up, go to [email or web address] . Click on \"Log in\" on the left side of the screen, which will take you to the Welcome page. Then click on \"Sign up Now\" on the right side of the page.     You will be asked to enter the access code listed below, as well as some personal information. Please follow the directions to create your username and password.     Your access code is: [email or web address]  Expires: 2017  9:37 AM     Your access code will  in 90 days. If you need help or a new code, please call your [facility] clinic or [phone].        Care EveryWhere ID     This is your Care EveryWhere ID. This could be used by other organizations to access your [facility] medical records  [id number]         Blood Pressure from Last 3 Encounters:   10/20/17 140/58   10/13/17 174/70   17 167/73    Weight from Last 3 Encounters:   10/20/17 80.3 kg (177 lb)   10/13/17 80.6 kg (177 lb 9.6 oz)   17 84.6 kg (186 lb 6.4 oz)              Today, you had the following     No orders found for display       Primary Care Provider Office Phone # Fax #    Ailyn Bernstein JUNIOR Figueroa [phone]       [address] [facility] [address]  [facility] [address]        Equal Access to Services     [facility]: Hadii aad tramaine hadasho Soomaali, waaxda luqadaha, qaybta kaalmada stephanie, kassandra white . So [facility] [phone].    ATENCIÓN: Si habla español, tiene a bailey disposición servicios gratuitos de asistencia lingüística. Llame al [phone].    We comply with applicable federal civil rights laws and [place] laws. We do not discriminate on the basis of race, color, national origin, age, disability, sex, sexual orientation, or gender identity.            Thank you!     Thank you for choosing " [facility] SOLID ORGAN TRANSPLANT  for your care. Our goal is always to provide you with excellent care. Hearing back from our patients is one way we can continue to improve our services. Please take a few minutes to complete the written survey that you may receive in the mail after your visit with us. Thank you!             Your Updated Medication List - Protect others around you: Learn how to safely use, store and throw away your medicines at www.disposemymeds.org.          This list is accurate as of: 10/25/17  9:39 AM.  Always use your most recent med list.                   Brand Name Dispense Instructions for use Diagnosis    albuterol 108 (90 BASE) MCG/ACT Inhaler    PROAIR HFA/PROVENTIL HFA/VENTOLIN HFA    18 g    Inhale 2 puffs into the lungs every 6 hours as needed for shortness of breath / dyspnea or wheezing    Chronic obstructive pulmonary disease, unspecified COPD type (H)       ASPIRIN LOW DOSE 81 MG EC tablet   Generic drug:  aspirin     30 tablet    Take 1 tablet (81 mg) by mouth daily    History of MI (myocardial infarction), Essential hypertension, benign       atorvastatin 40 MG tablet    LIPITOR    90 tablet    Take 1 tablet (40 mg) by mouth daily    Hyperlipidemia LDL goal <100       blood glucose monitoring lancets     1 Box    Test BS four times daily as directed    Type 2 diabetes mellitus without complication, with long-term current use of insulin (H)       blood glucose monitoring test strip    FREESTYLE LITE    50 strip    TEST BLOOD SUGAR TWO TIMES A DAY    Type 2 diabetes mellitus without complication, with long-term current use of insulin (H)       docusate sodium 100 MG capsule    COLACE    60 capsule    Take 1 capsule (100 mg) by mouth 2 times daily    Constipation, unspecified constipation type       EUCERIN CALMING DAILY MOIST Crea     1 Tube    Externally apply 1 dose * topically daily    Type II or unspecified type diabetes mellitus with neurological manifestations, not stated as  uncontrolled(250.60) (H)       ferrous sulfate 325 (65 FE) MG tablet    IRON    100 tablet    Take 1 tablet (325 mg) by mouth daily (with breakfast)    Iron deficiency anemia, unspecified       furosemide 40 MG tablet    LASIX    30 tablet    Take 1 tablet (40 mg) by mouth daily    Hyperkalemia       glucose-vitamin C 4-0.006 G Chew    DEX4 GLUCOSE    50 tablet    Take 1 tablet (4 g) by mouth every hour as needed for low blood sugar    Controlled type 2 diabetes mellitus with stage 4 chronic kidney disease, with long-term current use of insulin (H)       hydrocortisone 1 % ointment     30 g    Apply sparingly to affected area three times daily for 14 days.    Rash       insulin glargine 100 UNIT/ML injection    LANTUS    15 mL    Inject 10 Units Subcutaneous every morning    Controlled type 2 diabetes mellitus with stage 4 chronic kidney disease, with long-term current use of insulin (H)       insulin pen needle 31G X 6 MM     120 each    Use as directed    Type 2 diabetes mellitus without complication (H)       levothyroxine 200 MCG tablet    SYNTHROID/LEVOTHROID    90 tablet    Take 1 tablet (200 mcg) by mouth See Admin Instructions New daily increase. Recheck labs in 8 weeks.    Other specified hypothyroidism       losartan 100 MG tablet    COZAAR    90 tablet    Take 1 tablet (100 mg) by mouth daily    Hypertension associated with diabetes (H)       metoprolol 25 MG 24 hr tablet    TOPROL-XL    90 tablet    Take 1 tablet (25 mg) by mouth daily    Hypertension associated with diabetes (H)       mometasone-formoterol 100-5 MCG/ACT oral inhaler    DULERA    1 Inhaler    Inhale 2 puffs into the lungs 2 times daily    COPD (chronic obstructive pulmonary disease) (H)       nitroGLYcerin 0.4 MG sublingual tablet    NITROSTAT    25 tablet    Place 1 tablet (0.4 mg) under the tongue every 5 minutes as needed for chest pain    History of MI (myocardial infarction)       nystatin 916361 UNIT/GM Powd    MYCOSTATIN    30 g     Apply 1 g topically 3 times daily as needed    Groin rash       * order for DME     1 Device    One wheeled walker with seat and brakes and basket    Risk for falls       * order for DME     2 Device    Equipment being ordered: two pairs moderate knee high support hose    Edema, unspecified type       oxyCODONE 10 MG IR tablet   Start taking on:  11/2/2017    ROXICODONE    90 tablet    Take 1 tablet (10 mg) by mouth every 8 hours as needed    Chronic low back pain without sciatica, unspecified back pain laterality       polyethylene glycol powder    MIRALAX    510 g    Take 17 g (1 capful) by mouth daily    Slow transit constipation       SM ALCOHOL PREP 70 % Pads     100 each    Externally apply 1 pad topically 4 times daily    Type 2 diabetes mellitus without complication, with long-term current use of insulin (H)       sodium bicarbonate 325 MG tablet     180 tablet    Take 2 tablets (650 mg) by mouth 3 times daily    Acidosis, CKD (chronic kidney disease) stage 4, GFR 15-29 ml/min (H)       tiotropium 18 MCG capsule    SPIRIVA HANDIHALER    90 capsule    Inhale contents of one capsule daily.    Pulmonary emphysema, unspecified emphysema type (H)       varenicline 0.5 MG X 11 & 1 MG X 42 tablet    CHANTIX STARTING MONTH GRETEL    53 tablet    Take 0.5 mg tab daily for 3 days, then 0.5 mg tab twice daily for 4 days, then 1 mg twice daily.    Encounter for smoking cessation counseling       vitamin D 2000 UNITS tablet     60 tablet    Take 2,000 Units by mouth daily    Vitamin D deficiency disease       * Notice:  This list has 2 medication(s) that are the same as other medications prescribed for you. Read the directions carefully, and ask your doctor or other care provider to review them with you.

## 2017-10-25 NOTE — MR AVS SNAPSHOT
After Visit Summary   10/25/2017    Kim Anne    MRN: [id number]           Patient Information     Date Of Birth          1945        Visit Information        Provider Department      10/25/2017 10:15 AM Carlota Beckwith RN [facility] Solid Organ Transplant        Today's Diagnoses     ESRD (end stage renal disease) (H)    -  1       Follow-ups after your visit        Your next 10 appointments already scheduled     Feb 12, 2018 11:30 AM CST   Return Visit with LAMINE Chahal   [facility] ([facility])    [address]  [address]  [facility] [address]   [phone]            Feb 12, 2018 11:30 AM CST   Return Visit with JUNIOR Bishop CNP   [facility] ([facility])    [address]  [address]  [facility] [address]   [phone]            Feb 16, 2018 10:00 AM CST   Lab with GIOVANNA LAB   [facility] Lab ([facility])    [address]  1st Floor  [facility] [address] [phone]            Feb 16, 2018 11:00 AM CST   (Arrive by 10:30 AM)   Return Visit with Wendy Espinal PA-C   [facility] Nephrology ([facility])    [address]  [address]  [facility] [address] [phone]            Feb 16, 2018 11:30 AM CST   (Arrive by 11:15 AM)   SHORT with Denton Yuen RPH   [facility] Medication Therapy Management ([facility])    [address]  3rd Floor  [facility] [id number] [phone]            Feb 27, 2018  6:00 PM CST   (Arrive by 5:45 PM)   NEW PANCREAS/KIDNEY TRANSPLANT WORK-UP with Quincy Griffin MD   [facility] Heart Care ([facility])    [address]  [address]  [facility] [address] [phone]            Mar 29, 2018  7:30 AM CDT   PFT VISIT with GIOVANNA PFL B   [facility] Pulmonary  "Function Testing ([facility])    [address]  3rd Floor  [facility] [id number] [phone]            Mar 29, 2018  8:00 AM CDT   (Arrive by 7:45 AM)   Return Visit with Margret Packer MD   [facility] ([facility])    [address]  [address]  [facility] [address] [phone]              Who to contact     If you have questions or need follow up information about today's clinic visit or your schedule please contact [facility]ANSPL[facility] directly at [phone].  Normal or non-critical lab and imaging results will be communicated to you by MyChart, letter or phone within 4 business days after the clinic has received the results. If you do not hear from us within 7 days, please contact the clinic through MyChart or phone. If you have a critical or abnormal lab result, we will notify you by phone as soon as possible.  Submit refill requests through [employer] or call your pharmacy and they will forward the refill request to us. Please allow 3 business days for your refill to be completed.          Additional Information About Your Visit        MyChart Information     The Smartphone Physical lets you send messages to your doctor, view your test results, renew your prescriptions, schedule appointments and more. To sign up, go to [email or web address] . Click on \"Log in\" on the left side of the screen, which will take you to the Welcome page. Then click on \"Sign up Now\" on the right side of the page.     You will be asked to enter the access code listed below, as well as some personal information. Please follow the directions to create your username and password.     Your access code is: [email or web address]  Expires: 3/1/2018  6:30 AM     Your access code will  in 90 days. If you need help or a new code, please call your [place] clinic or [phone].        Care EveryWhere ID     This is your Care EveryWhere " ID. This could be used by other organizations to access your [place] medical records  [id number]         Blood Pressure from Last 3 Encounters:   01/26/18 (!) 84/52   01/16/18 144/74   01/15/18 130/56    Weight from Last 3 Encounters:   01/26/18 77.1 kg (170 lb)   01/15/18 79.4 kg (175 lb)   12/15/17 80.1 kg (176 lb 8 oz)              Today, you had the following     No orders found for display       Primary Care Provider Office Phone # Fax #    JUNIOR Bishop [phone]       [address]  [facility] [address]        Equal Access to Services     CECIL TOLLIVER : Hadii aad ku hadasho Sofranciscaali, waaxda luqadaha, qaybta kaalmada adeegyada, waxlilliana white . So [facility] [phone].    ATENCIÓN: Si habla español, tiene a bailey disposición servicios gratuitos de asistencia lingüística. Llame al [phone].    We comply with applicable federal civil rights laws and [place] laws. We do not discriminate on the basis of race, color, national origin, age, disability, sex, sexual orientation, or gender identity.            Thank you!     Thank you for choosing [facility]GAN TRANSPLANT  for your care. Our goal is always to provide you with excellent care. Hearing back from our patients is one way we can continue to improve our services. Please take a few minutes to complete the written survey that you may receive in the mail after your visit with us. Thank you!             Your Updated Medication List - Protect others around you: Learn how to safely use, store and throw away your medicines at www.disposemymeds.org.          This list is accurate as of 10/25/17 11:59 PM.  Always use your most recent med list.                   Brand Name Dispense Instructions for use Diagnosis    albuterol 108 (90 BASE) MCG/ACT Inhaler    PROAIR HFA/PROVENTIL HFA/VENTOLIN HFA    18 g    Inhale 2 puffs into the lungs every 6 hours as needed for shortness of breath / dyspnea or  wheezing    Chronic obstructive pulmonary disease, unspecified COPD type (H)       ASPIRIN LOW DOSE 81 MG EC tablet   Generic drug:  aspirin     30 tablet    Take 1 tablet (81 mg) by mouth daily    History of MI (myocardial infarction), Essential hypertension, benign       blood glucose monitoring lancets     1 Box    Test BS four times daily as directed    Type 2 diabetes mellitus without complication, with long-term current use of insulin (H)       blood glucose monitoring test strip    FREESTYLE LITE    50 strip    TEST BLOOD SUGAR TWO TIMES A DAY    Type 2 diabetes mellitus without complication, with long-term current use of insulin (H)       EUCERIN CALMING DAILY MOIST Crea     1 Tube    Externally apply 1 dose * topically daily    Type II or unspecified type diabetes mellitus with neurological manifestations, not stated as uncontrolled(250.60) (H)       glucose-vitamin C 4-0.006 G Chew    DEX4 GLUCOSE    50 tablet    Take 1 tablet (4 g) by mouth every hour as needed for low blood sugar    Controlled type 2 diabetes mellitus with stage 4 chronic kidney disease, with long-term current use of insulin (H)       insulin glargine 100 UNIT/ML injection    LANTUS    15 mL    Inject 10 Units Subcutaneous every morning    Controlled type 2 diabetes mellitus with stage 4 chronic kidney disease, with long-term current use of insulin (H)       insulin pen needle 31G X 6 MM     120 each    Use as directed    Type 2 diabetes mellitus without complication (H)       levothyroxine 200 MCG tablet    SYNTHROID/LEVOTHROID    90 tablet    Take 1 tablet (200 mcg) by mouth See Admin Instructions New daily increase. Recheck labs in 8 weeks.    Other specified hypothyroidism       losartan 100 MG tablet    COZAAR    90 tablet    Take 1 tablet (100 mg) by mouth daily    Hypertension associated with diabetes (H)       metoprolol succinate 25 MG 24 hr tablet    TOPROL-XL    90 tablet    Take 1 tablet (25 mg) by mouth daily    Hypertension  associated with diabetes (H)       mometasone-formoterol 100-5 MCG/ACT oral inhaler    DULERA    1 Inhaler    Inhale 2 puffs into the lungs 2 times daily    COPD (chronic obstructive pulmonary disease) (H)       nystatin 840195 UNIT/GM Powd    MYCOSTATIN    30 g    Apply 1 g topically 3 times daily as needed    Groin rash       * order for DME     1 Device    One wheeled walker with seat and brakes and basket    Risk for falls       * order for DME     2 Device    Equipment being ordered: two pairs moderate knee high support hose    Edema, unspecified type       polyethylene glycol powder    MIRALAX    510 g    Take 17 g (1 capful) by mouth daily    Slow transit constipation       SM ALCOHOL PREP 70 % Pads     100 each    Externally apply 1 pad topically 4 times daily    Type 2 diabetes mellitus without complication, with long-term current use of insulin (H)       vitamin D 2000 UNITS tablet     60 tablet    Take 2,000 Units by mouth daily    Vitamin D deficiency disease       * Notice:  This list has 2 medication(s) that are the same as other medications prescribed for you. Read the directions carefully, and ask your doctor or other care provider to review them with you.

## 2017-10-25 NOTE — MR AVS SNAPSHOT
After Visit Summary   10/25/2017    Kim Anne    MRN: [id number]           Patient Information     Date Of Birth          1945        Visit Information        Provider Department      10/25/2017 11:15 AM Gricelda Galindo MSW [facility] Solid Organ Transplant        Today's Diagnoses     Awaiting organ transplant status    -  1       Follow-ups after your visit        Your next 10 appointments already scheduled     Nov 06, 2017 10:15 AM CST   LAB with  LAB   [facility] Lab [facility])    [address] [place] [address] [phone]           Patient must bring picture ID. Patient should be prepared to give a urine specimen  Please do not eat 10-12 hours before your appointment if you are coming in fasting for labs on lipids, cholesterol, or glucose (sugar). Pregnant women should follow their Care Team instructions. Water with medications is okay. Do not drink coffee or other fluids. If you have concerns about taking  your medications, please ask at office or if scheduling via [employer], send a message by clicking on Secure Messaging, Message Your Care Team.            Nov 06, 2017 10:30 AM CST   US AORTIC IMAGING with UCUSV2   [facility] US ([facility])    [address] [place] [address] [phone]           Please bring a list of your medicines (including vitamins, minerals and over-the-counter drugs). Also, tell your doctor about any allergies you may have. Wear comfortable clothes and leave your valuables at home.  Adults: No eating or drinking for 8 hours before the exam. You may take medicine with a small sip of water.  Children: - Children 6+ years: No food or drink for 6 hours before exam. - Children 1-5 years: No food or drink for 4 hours before exam. - Infants, breast-fed: may have breast milk up to 2 hours before exam. - Infants, formula: may have  bottle until 4 hours before exam.  Please call the Imaging Department at your exam site with any questions.            Nov 06, 2017 11:00 AM CST   US AORTA/IVC/ILIAC DUPLEX COMPLETE with UCUSV2   [facility] US ([facility])    [address] [place] [id number] [phone]           Please bring a list of your medicines (including vitamins, minerals and over-the-counter drugs). Also, tell your doctor about any allergies you may have. Wear comfortable clothes and leave your valuables at home.  Adults: No eating or drinking for 8 hours before the exam. You may take medicine with a small sip of water.  Children: - Children 6+ years: No food or drink for 6 hours before exam. - Children 1-5 years: No food or drink for 4 hours before exam. - Infants, breast-fed: may have breast milk up to 2 hours before exam. - Infants, formula: may have bottle until 4 hours before exam.  Please call the Imaging Department at your exam site with any questions.            Nov 06, 2017  2:30 PM CST   (Arrive by 2:15 PM)   NEW PANCREAS/KIDNEY TRANSPLANT WORK-UP with Kunal Nj MD   [facility] ([facility])    [address] [place] [address] [phone]            Dec 15, 2017 10:30 AM CST   (Arrive by 10:00 AM)   Return Visit with Wendy Espinal PA-C   [facility] Nephrology ([facility])    [address] [place] [address]   [phone]            Nadeem 15, 2018 10:30 AM CST   Return Visit with JUNIOR Rodriguez CNP   [facility] ([facility])    [address]  [facility] [address] [phone]            Nadeem 15, 2018 10:30 AM CST   Return Visit with LAMINE Chahal   [facility] ([facility])    [address]  [address]  [facility] [address]   [phone]            Mar 29, 2018  7:30 AM CDT   PFT VISIT with GIOVANNA CADENA   [facility] Pulmonary Function Testing ([facility])    [address] [place] [id number] [phone]            Mar 29, 2018  8:00 AM CDT   (Arrive by 7:45 AM)   Return Visit with Margret Packer MD   [facility] for [facility] ([facility])    [address] [place] [id number] [phone]              Who to contact     If you have questions or need follow up information about today's clinic visit or your schedule please contact [facility] SOLID ORGAN TRANSPLANT directly at [phone].  Normal or non-critical lab and imaging results will be communicated to you by [employer], letter or phone within 4 business days after the clinic has received the results. If you do not hear from us within 7 days, please contact the clinic through [employer] or phone. If you have a critical or abnormal lab result, we will notify you by phone as soon as possible.  Submit refill requests through [employer] or call your pharmacy and they will forward the refill request to us. Please allow 3 business days for your refill to be completed.          Additional Information About Your Visit        HELM [employer]shar[facility] [employer] Information     [employer] lets you send messages to your doctor, view your test results, renew your prescriptions, schedule appointments and more. To sign up, go to [email or web address] . Click on \"Log in\" on the left side of the screen, which will take you to the Welcome page. Then click on \"Sign up Now\" on the right side of the page.     You will be asked to enter the access code listed below, as well as some personal information. Please follow the directions to create your username and password.     Your access code is: [email or web address]  Expires: 2017  9:37 AM     Your access code will  in 90 days. " If you need help or a new code, please call your [place] clinic or [phone].        Care EveryWhere ID     This is your Care EveryWhere ID. This could be used by other organizations to access your [place] medical records  [id number]         Blood Pressure from Last 3 Encounters:   10/25/17 179/77   10/20/17 140/58   10/13/17 174/70    Weight from Last 3 Encounters:   10/25/17 80.6 kg (177 lb 9.6 oz)   10/20/17 80.3 kg (177 lb)   10/13/17 80.6 kg (177 lb 9.6 oz)              Today, you had the following     No orders found for display       Primary Care Provider Office Phone # Fax #    Ailyn Colecristelaeloise APRCRISTELA [phone]       [address] [facility] [address]  [facility] [address]        Equal Access to Services     [facility]: Hadii aad ku hadasho Sofranciscaali, waaxda luqadaha, qaybta kaalmada adeegyada, waxay raimundoin hayhannan mechelle white . So [facility] [phone].    ATENCIÓN: Si habla español, tiene a bailey disposición servicios gratuitos de asistencia lingüística. Llame al [phone].    We comply with applicable federal civil rights laws and [place] laws. We do not discriminate on the basis of race, color, national origin, age, disability, sex, sexual orientation, or gender identity.            Thank you!     Thank you for choosing [facility] SOLID ORGAN TRANSPLANT  for your care. Our goal is always to provide you with excellent care. Hearing back from our patients is one way we can continue to improve our services. Please take a few minutes to complete the written survey that you may receive in the mail after your visit with us. Thank you!             Your Updated Medication List - Protect others around you: Learn how to safely use, store and throw away your medicines at www.disposemymeds.org.          This list is accurate as of: 10/25/17 11:59 PM.  Always use your most recent med list.                   Brand Name Dispense Instructions for use Diagnosis    albuterol 108 (90 BASE) MCG/ACT  Inhaler    PROAIR HFA/PROVENTIL HFA/VENTOLIN HFA    18 g    Inhale 2 puffs into the lungs every 6 hours as needed for shortness of breath / dyspnea or wheezing    Chronic obstructive pulmonary disease, unspecified COPD type (H)       ASPIRIN LOW DOSE 81 MG EC tablet   Generic drug:  aspirin     30 tablet    Take 1 tablet (81 mg) by mouth daily    History of MI (myocardial infarction), Essential hypertension, benign       atorvastatin 40 MG tablet    LIPITOR    90 tablet    Take 1 tablet (40 mg) by mouth daily    Hyperlipidemia LDL goal <100       blood glucose monitoring lancets     1 Box    Test BS four times daily as directed    Type 2 diabetes mellitus without complication, with long-term current use of insulin (H)       blood glucose monitoring test strip    FREESTYLE LITE    50 strip    TEST BLOOD SUGAR TWO TIMES A DAY    Type 2 diabetes mellitus without complication, with long-term current use of insulin (H)       docusate sodium 100 MG capsule    COLACE    60 capsule    Take 1 capsule (100 mg) by mouth 2 times daily    Constipation, unspecified constipation type       EUCERIN CALMING DAILY MOIST Crea     1 Tube    Externally apply 1 dose * topically daily    Type II or unspecified type diabetes mellitus with neurological manifestations, not stated as uncontrolled(250.60) (H)       ferrous sulfate 325 (65 FE) MG tablet    IRON    100 tablet    Take 1 tablet (325 mg) by mouth daily (with breakfast)    Iron deficiency anemia, unspecified       furosemide 40 MG tablet    LASIX    30 tablet    Take 1 tablet (40 mg) by mouth daily    Hyperkalemia       glucose-vitamin C 4-0.006 G Chew    DEX4 GLUCOSE    50 tablet    Take 1 tablet (4 g) by mouth every hour as needed for low blood sugar    Controlled type 2 diabetes mellitus with stage 4 chronic kidney disease, with long-term current use of insulin (H)       hydrocortisone 1 % ointment     30 g    Apply sparingly to affected area three times daily for 14 days.     Rash       insulin glargine 100 UNIT/ML injection    LANTUS    15 mL    Inject 10 Units Subcutaneous every morning    Controlled type 2 diabetes mellitus with stage 4 chronic kidney disease, with long-term current use of insulin (H)       insulin pen needle 31G X 6 MM     120 each    Use as directed    Type 2 diabetes mellitus without complication (H)       levothyroxine 200 MCG tablet    SYNTHROID/LEVOTHROID    90 tablet    Take 1 tablet (200 mcg) by mouth See Admin Instructions New daily increase. Recheck labs in 8 weeks.    Other specified hypothyroidism       losartan 100 MG tablet    COZAAR    90 tablet    Take 1 tablet (100 mg) by mouth daily    Hypertension associated with diabetes (H)       metoprolol 25 MG 24 hr tablet    TOPROL-XL    90 tablet    Take 1 tablet (25 mg) by mouth daily    Hypertension associated with diabetes (H)       mometasone-formoterol 100-5 MCG/ACT oral inhaler    DULERA    1 Inhaler    Inhale 2 puffs into the lungs 2 times daily    COPD (chronic obstructive pulmonary disease) (H)       nitroGLYcerin 0.4 MG sublingual tablet    NITROSTAT    25 tablet    Place 1 tablet (0.4 mg) under the tongue every 5 minutes as needed for chest pain    History of MI (myocardial infarction)       nystatin [id number] UNIT/GM Powd    MYCOSTATIN    30 g    Apply 1 g topically 3 times daily as needed    Groin rash       * order for DME     1 Device    One wheeled walker with seat and brakes and basket    Risk for falls       * order for DME     2 Device    Equipment being ordered: two pairs moderate knee high support hose    Edema, unspecified type       oxyCODONE 10 MG IR tablet   Start taking on:  11/2/2017    ROXICODONE    90 tablet    Take 1 tablet (10 mg) by mouth every 8 hours as needed    Chronic low back pain without sciatica, unspecified back pain laterality       polyethylene glycol powder    MIRALAX    510 g    Take 17 g (1 capful) by mouth daily    Slow transit constipation       SM ALCOHOL PREP 70  % Pads     100 each    Externally apply 1 pad topically 4 times daily    Type 2 diabetes mellitus without complication, with long-term current use of insulin (H)       sodium bicarbonate 325 MG tablet     180 tablet    Take 2 tablets (650 mg) by mouth 3 times daily    Acidosis, CKD (chronic kidney disease) stage 4, GFR 15-29 ml/min (H)       tiotropium 18 MCG capsule    SPIRIVA HANDIHALER    90 capsule    Inhale contents of one capsule daily.    Pulmonary emphysema, unspecified emphysema type (H)       varenicline 0.5 MG X 11 & 1 MG X 42 tablet    CHANTIX STARTING MONTH GRETEL    53 tablet    Take 0.5 mg tab daily for 3 days, then 0.5 mg tab twice daily for 4 days, then 1 mg twice daily.    Encounter for smoking cessation counseling       vitamin D 2000 UNITS tablet     60 tablet    Take 2,000 Units by mouth daily    Vitamin D deficiency disease       * Notice:  This list has 2 medication(s) that are the same as other medications prescribed for you. Read the directions carefully, and ask your doctor or other care provider to review them with you.

## 2017-10-25 NOTE — MR AVS SNAPSHOT
After Visit Summary   10/25/2017    Kim Anne    MRN: [id number]           Patient Information     Date Of Birth          1945        Visit Information        Provider Department      10/25/2017 10:30 AM GIOVANNA PKSHIV PATIENT 3 [facility] Solid Organ Transplant        Today's Diagnoses     CKD (chronic kidney disease) stage 4, GFR 15-29 ml/min (H)    -  1    Smoker        Single kidney        Hypertension goal BP (blood pressure) < 140/90           Follow-ups after your visit        Your next 10 appointments already scheduled     Nov 06, 2017 10:15 AM CST   LAB with  LAB   [facility] ([facility])    [address] [place] [id number] [phone]           Please do not eat 10-12 hours before your appointment if you are coming in fasting for labs on lipids, cholesterol, or glucose (sugar). This does not apply to pregnant women. Water, hot tea and black coffee (with nothing added) are okay. Do not drink other fluids, diet soda or chew gum.            Nov 06, 2017 10:30 AM CST   US AORTIC IMAGING with UCUSV2   [facility] US ([facility])    [address] [place] [id number] [phone]           Please bring a list of your medicines (including vitamins, minerals and over-the-counter drugs). Also, tell your doctor about any allergies you may have. Wear comfortable clothes and leave your valuables at home.  Adults: No eating or drinking for 8 hours before the exam. You may take medicine with a small sip of water.  Children: - Children 6+ years: No food or drink for 6 hours before exam. - Children 1-5 years: No food or drink for 4 hours before exam. - Infants, breast-fed: may have breast milk up to 2 hours before exam. - Infants, formula: may have bottle until 4 hours before exam.  Please call the Imaging Department at your exam site with any questions.            Nov 06, 2017 11:00  AM CST   US AORTA/IVC/ILIAC DUPLEX COMPLETE with UCUSV2   [facility] ([facility])    [address] [place] [id number] [phone]           Please bring a list of your medicines (including vitamins, minerals and over-the-counter drugs). Also, tell your doctor about any allergies you may have. Wear comfortable clothes and leave your valuables at home.  Adults: No eating or drinking for 8 hours before the exam. You may take medicine with a small sip of water.  Children: - Children 6+ years: No food or drink for 6 hours before exam. - Children 1-5 years: No food or drink for 4 hours before exam. - Infants, breast-fed: may have breast milk up to 2 hours before exam. - Infants, formula: may have bottle until 4 hours before exam.  Please call the Imaging Department at your exam site with any questions.            Nov 06, 2017  2:30 PM CST   (Arrive by 2:15 PM)   NEW PANCREAS/KIDNEY TRANSPLANT WORK-UP with Kunal Nj MD   [facility] ([facility])    [address] [place] [address]   [phone]            Dec 15, 2017 10:30 AM CST   (Arrive by 10:00 AM)   Return Visit with Wendy Espinal PA-C   [facility]ph[facility] ([facility])    [address] [place] [address]   [phone]            Nadeem 15, 2018 10:30 AM CST   Return Visit with JUNIOR Day CNP   [facility] ([facility])    [address]  [address]  [facility] [address]   [phone]            Nadeem 15, 2018 10:30 AM CST   Return Visit with LAMINE Chahal   [facility] ([facility])    [address]  [address]  [facility] [address]   [phone]            Mar 29, 2018  7:30 AM CDT   PFT VISIT with  PFL TAMMI   [facility]  "Function Testing ([facility])    [address] [id number] [phone]            Mar 29, 2018  8:00 AM CDT   (Arrive by 7:45 AM)   Return Visit with Margret Packer MD   [facility] ([facility])    [address] [id number] [phone]              Who to contact     If you have questions or need follow up information about today's clinic visit or your schedule please contact [facility] SOLID ORGAN TRANSPLANT directly at [phone].  Normal or non-critical lab and imaging results will be communicated to you by MyChart, letter or phone within 4 business days after the clinic has received the results. If you do not hear from us within 7 days, please contact the clinic through MyChart or phone. If you have a critical or abnormal lab result, we will notify you by phone as soon as possible.  Submit refill requests through [employer] or call your pharmacy and they will forward the refill request to us. Please allow 3 business days for your refill to be completed.          Additional Information About Your Visit        MyChart Information     [employer] lets you send messages to your doctor, view your test results, renew your prescriptions, schedule appointments and more. To sign up, go to [email or web address] . Click on \"Log in\" on the left side of the screen, which will take you to the Welcome page. Then click on \"Sign up Now\" on the right side of the page.     You will be asked to enter the access code listed below, as well as some personal information. Please follow the directions to create your username and password.     Your access code is: [email or web address]  Expires: 2017  9:37 AM     Your access code will  in 90 days. If you need help or a new code, please call your [facility] clinic or [phone].        Care EveryWhere ID     This is your Care EveryWhere " "ID. This could be used by other organizations to access your [place] medical records  [id number]        Your Vitals Were     Pulse Temperature Respirations Height Pulse Oximetry BMI (Body Mass Index)    91 97.7  F (36.5  C) (Oral) 20 1.651 m (5' 5\") 100% 29.55 kg/m2       Blood Pressure from Last 3 Encounters:   10/25/17 179/77   10/20/17 140/58   10/13/17 174/70    Weight from Last 3 Encounters:   10/25/17 80.6 kg (177 lb 9.6 oz)   10/20/17 80.3 kg (177 lb)   10/13/17 80.6 kg (177 lb 9.6 oz)              Today, you had the following     No orders found for display       Primary Care Provider Office Phone # Fax #    Ailyn Bernstein Nieves APRCRISTELA [phone]       [address] [facility] [address]  [facility] [address]        Equal Access to Services     [facility]: Hadii aad ku hadasho Soomaali, waaxda luqadaha, qaybta kaalmada adeegyada, waxay idiin hayhannan mechelle white . So [facility] [phone].    ATENCIÓN: Si habla español, tiene a bailey disposición servicios gratuitos de asistencia lingüística. Llame al [phone].    We comply with applicable federal civil rights laws and [place] laws. We do not discriminate on the basis of race, color, national origin, age, disability, sex, sexual orientation, or gender identity.            Thank you!     Thank you for choosing [facility] SOLID ORGAN TRANSPLANT  for your care. Our goal is always to provide you with excellent care. Hearing back from our patients is one way we can continue to improve our services. Please take a few minutes to complete the written survey that you may receive in the mail after your visit with us. Thank you!             Your Updated Medication List - Protect others around you: Learn how to safely use, store and throw away your medicines at www.disposemymeds.org.          This list is accurate as of: 10/25/17 11:59 PM.  Always use your most recent med list.                   Brand Name Dispense Instructions for use Diagnosis    " albuterol 108 (90 BASE) MCG/ACT Inhaler    PROAIR HFA/PROVENTIL HFA/VENTOLIN HFA    18 g    Inhale 2 puffs into the lungs every 6 hours as needed for shortness of breath / dyspnea or wheezing    Chronic obstructive pulmonary disease, unspecified COPD type (H)       ASPIRIN LOW DOSE 81 MG EC tablet   Generic drug:  aspirin     30 tablet    Take 1 tablet (81 mg) by mouth daily    History of MI (myocardial infarction), Essential hypertension, benign       atorvastatin 40 MG tablet    LIPITOR    90 tablet    Take 1 tablet (40 mg) by mouth daily    Hyperlipidemia LDL goal <100       blood glucose monitoring lancets     1 Box    Test BS four times daily as directed    Type 2 diabetes mellitus without complication, with long-term current use of insulin (H)       blood glucose monitoring test strip    FREESTYLE LITE    50 strip    TEST BLOOD SUGAR TWO TIMES A DAY    Type 2 diabetes mellitus without complication, with long-term current use of insulin (H)       docusate sodium 100 MG capsule    COLACE    60 capsule    Take 1 capsule (100 mg) by mouth 2 times daily    Constipation, unspecified constipation type       EUCERIN CALMING DAILY MOIST Crea     1 Tube    Externally apply 1 dose * topically daily    Type II or unspecified type diabetes mellitus with neurological manifestations, not stated as uncontrolled(250.60) (H)       furosemide 40 MG tablet    LASIX    30 tablet    Take 1 tablet (40 mg) by mouth daily    Hyperkalemia       glucose-vitamin C 4-0.006 G Chew    DEX4 GLUCOSE    50 tablet    Take 1 tablet (4 g) by mouth every hour as needed for low blood sugar    Controlled type 2 diabetes mellitus with stage 4 chronic kidney disease, with long-term current use of insulin (H)       hydrocortisone 1 % ointment     30 g    Apply sparingly to affected area three times daily for 14 days.    Rash       insulin glargine 100 UNIT/ML injection    LANTUS    15 mL    Inject 10 Units Subcutaneous every morning    Controlled type  2 diabetes mellitus with stage 4 chronic kidney disease, with long-term current use of insulin (H)       insulin pen needle 31G X 6 MM     120 each    Use as directed    Type 2 diabetes mellitus without complication (H)       levothyroxine 200 MCG tablet    SYNTHROID/LEVOTHROID    90 tablet    Take 1 tablet (200 mcg) by mouth See Admin Instructions New daily increase. Recheck labs in 8 weeks.    Other specified hypothyroidism       losartan 100 MG tablet    COZAAR    90 tablet    Take 1 tablet (100 mg) by mouth daily    Hypertension associated with diabetes (H)       metoprolol 25 MG 24 hr tablet    TOPROL-XL    90 tablet    Take 1 tablet (25 mg) by mouth daily    Hypertension associated with diabetes (H)       mometasone-formoterol 100-5 MCG/ACT oral inhaler    DULERA    1 Inhaler    Inhale 2 puffs into the lungs 2 times daily    COPD (chronic obstructive pulmonary disease) (H)       nitroGLYcerin 0.4 MG sublingual tablet    NITROSTAT    25 tablet    Place 1 tablet (0.4 mg) under the tongue every 5 minutes as needed for chest pain    History of MI (myocardial infarction)       nystatin 473856 UNIT/GM Powd    MYCOSTATIN    30 g    Apply 1 g topically 3 times daily as needed    Groin rash       * order for DME     1 Device    One wheeled walker with seat and brakes and basket    Risk for falls       * order for DME     2 Device    Equipment being ordered: two pairs moderate knee high support hose    Edema, unspecified type       oxyCODONE 10 MG IR tablet   Start taking on:  11/2/2017    ROXICODONE    90 tablet    Take 1 tablet (10 mg) by mouth every 8 hours as needed    Chronic low back pain without sciatica, unspecified back pain laterality       polyethylene glycol powder    MIRALAX    510 g    Take 17 g (1 capful) by mouth daily    Slow transit constipation       SM ALCOHOL PREP 70 % Pads     100 each    Externally apply 1 pad topically 4 times daily    Type 2 diabetes mellitus without complication, with long-term  current use of insulin (H)       sodium bicarbonate 325 MG tablet     180 tablet    Take 2 tablets (650 mg) by mouth 3 times daily    Acidosis, CKD (chronic kidney disease) stage 4, GFR 15-29 ml/min (H)       tiotropium 18 MCG capsule    SPIRIVA HANDIHALER    90 capsule    Inhale contents of one capsule daily.    Pulmonary emphysema, unspecified emphysema type (H)       varenicline 0.5 MG X 11 & 1 MG X 42 tablet    CHANTIX STARTING MONTH GRETEL    53 tablet    Take 0.5 mg tab daily for 3 days, then 0.5 mg tab twice daily for 4 days, then 1 mg twice daily.    Encounter for smoking cessation counseling       vitamin D 2000 UNITS tablet     60 tablet    Take 2,000 Units by mouth daily    Vitamin D deficiency disease       * Notice:  This list has 2 medication(s) that are the same as other medications prescribed for you. Read the directions carefully, and ask your doctor or other care provider to review them with you.

## 2017-10-25 NOTE — NURSING NOTE
Pre Abdominal Organ Transplant Coordinator Evaluation Note:    MD present: Transplant Surgeon and Transplant Nephrologist     Attendees: self and pt's cousin Maggi     Type of transplant: kidney    Required Topic(s) Discussed: Evaluation notification document, SRTR data, Multiple wait list brochure, KDPI, Evaluation/approval process, Selection committee process, Wait list process and Living donor process    Teaching: Instruct patient on living donor process/provided contact info, Provided my business card and To call me with any questions    Assessment/Plan: I was not present at either provider consults today. I did spend approximately 30 minutes with pt and her cousin Maggi to review the pre-transplant process and to answer questions. Pt stating that she knows she will receive extra points on the wait list due to her previously donating a kidney and spoke about this today with the transplant surgeon. Pt unaware of any potential live donors at this time. Pt signed the signature page of the Receipt of Information for Kidney Transplant Recipients. Pt signed the KDPI > 85 % consent with a yes response. Explained to pt I will call her with the outcome of the Selection Committee Meeting. Instructed pt to call me with questions. Pt and cousin expressed good understanding of all and was in good agreement with the plan.     Time spent with patient: 30 minutes  Carlota Beckwith  --  Nurse Transplant Coordinator   Pager [phone]
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
## 2017-10-25 NOTE — MR AVS SNAPSHOT
After Visit Summary   10/25/2017    Kim Anne    MRN: [id number]           Patient Information     Date Of Birth          1945        Visit Information        Provider Department      10/25/2017 9:30 AM GIOVANNA ORONA PATIENT 3 [facility] Solid Organ Transplant        Today's Diagnoses     CKD (chronic kidney disease) stage 4, GFR 15-29 ml/min (H)    -  1    Essential hypertension        Organ transplant candidate           Follow-ups after your visit        Your next 10 appointments already scheduled     Oct 25, 2017  3:00 PM CDT   Ech Complete with [id number]   [facility] Echo ([facility])    [address] [place] [id number] [phone]           1. Please bring or wear a comfortable two-piece outfit. 2. You may eat, drink and take your normal medicines. 3. For any questions that cannot be answered, please contact the ordering physician            Nov 06, 2017 10:15 AM CST   LAB with  LAB   [facility] Lab ([facility])    [address] [place] [id number] [phone]           Patient must bring picture ID. Patient should be prepared to give a urine specimen  Please do not eat 10-12 hours before your appointment if you are coming in fasting for labs on lipids, cholesterol, or glucose (sugar). Pregnant women should follow their Care Team instructions. Water with medications is okay. Do not drink coffee or other fluids. If you have concerns about taking  your medications, please ask at office or if scheduling via Litblochart, send a message by clicking on Secure Messaging, Message Your Care Team.            Nov 06, 2017 10:30 AM CST   US AORTIC IMAGING with UCUSV2   [facility] US ([facility])    [address] [place] [address] [phone]           Please bring a list of your medicines (including vitamins, minerals and  over-the-counter drugs). Also, tell your doctor about any allergies you may have. Wear comfortable clothes and leave your valuables at home.  Adults: No eating or drinking for 8 hours before the exam. You may take medicine with a small sip of water.  Children: - Children 6+ years: No food or drink for 6 hours before exam. - Children 1-5 years: No food or drink for 4 hours before exam. - Infants, breast-fed: may have breast milk up to 2 hours before exam. - Infants, formula: may have bottle until 4 hours before exam.  Please call the Imaging Department at your exam site with any questions.            Nov 06, 2017 11:00 AM CST   US AORTA/IVC/ILIAC DUPLEX COMPLETE with UCUSV2   [facility] US ([facility])    [address] [place] [id number] [phone]           Please bring a list of your medicines (including vitamins, minerals and over-the-counter drugs). Also, tell your doctor about any allergies you may have. Wear comfortable clothes and leave your valuables at home.  Adults: No eating or drinking for 8 hours before the exam. You may take medicine with a small sip of water.  Children: - Children 6+ years: No food or drink for 6 hours before exam. - Children 1-5 years: No food or drink for 4 hours before exam. - Infants, breast-fed: may have breast milk up to 2 hours before exam. - Infants, formula: may have bottle until 4 hours before exam.  Please call the Imaging Department at your exam site with any questions.            Nov 06, 2017  2:30 PM CST   (Arrive by 2:15 PM)   NEW PANCREAS/KIDNEY TRANSPLANT WORK-UP with Kunal Nj MD   [facility] ([facility])    [address] [place] [id number] [phone]            Dec 15, 2017 10:30 AM CST   (Arrive by 10:00 AM)   Return Visit with Wendy Espinal PA-C   [facility]phrology ([facility])    [address] [place] [address]    3rd [facility] [address]   [phone]            Nadeem 15, 2018 10:30 AM CST   Return Visit with JUNIOR Rodriguez CNP   [facility] ([facility])    [address]  [address]  [facility] [address]   [phone]            Nadeem 15, 2018 10:30 AM CST   Return Visit with LAMINE Chahal   [facility] ([facility])    [address]  [address]  [facility] [address]   [phone]            Mar 29, 2018  7:30 AM CDT   PFT VISIT with  PFL B   [facility] Pulmonary Function Testing ([facility])    [address] [place] [address]   [phone]            Mar 29, 2018  8:00 AM CDT   (Arrive by 7:45 AM)   Return Visit with Margret Packer MD   [facility] ([facility])    [address] [place] [id number] [phone]              Who to contact     If you have questions or need follow up information about today's clinic visit or your schedule please contact [facility] SOLID ORGAN TRANSPLANT directly at [phone].  Normal or non-critical lab and imaging results will be communicated to you by Status4hart, letter or phone within 4 business days after the clinic has received the results. If you do not hear from us within 7 days, please contact the clinic through Status4hart or phone. If you have a critical or abnormal lab result, we will notify you by phone as soon as possible.  Submit refill requests through Shopventory or call your pharmacy and they will forward the refill request to us. Please allow 3 business days for your refill to be completed.          Additional Information About Your Visit        Shopventory Information     Shopventory lets you send messages to your doctor, view your test results, renew your prescriptions, schedule appointments  "and more. To sign up, go to [email or web address] . Click on \"Log in\" on the left side of the screen, which will take you to the Welcome page. Then click on \"Sign up Now\" on the right side of the page.     You will be asked to enter the access code listed below, as well as some personal information. Please follow the directions to create your username and password.     Your access code is: [email or web address]  Expires: 2017  9:37 AM     Your access code will  in 90 days. If you need help or a new code, please call your [place] clinic or [phone].        Care EveryWhere ID     This is your Care EveryWhere ID. This could be used by other organizations to access your [place] medical records  [id number]         Blood Pressure from Last 3 Encounters:   10/25/17 179/77   10/20/17 140/58   10/13/17 174/70    Weight from Last 3 Encounters:   10/25/17 80.6 kg (177 lb 9.6 oz)   10/20/17 80.3 kg (177 lb)   10/13/17 80.6 kg (177 lb 9.6 oz)              Today, you had the following     No orders found for display       Primary Care Provider Office Phone # Fax #    JUNIOR Rodriguez [phone]       [address]  [facility] [address]        Equal Access to Services     CECIL TOLLIVER : Hadii alysha ku hadasho Soomaali, waaxda luqadaha, qaybta kaalmada adeegyafahad, kassandra white . So [facility] [phone].    ATENCIÓN: Si habla español, tiene a bailey disposición servicios gratuitos de asistencia lingüística. Llame al [phone].    We comply with applicable federal civil rights laws and [place] laws. We do not discriminate on the basis of race, color, national origin, age, disability, sex, sexual orientation, or gender identity.            Thank you!     Thank you for choosing [facility] SOLID ORGAN TRANSPLANT  for your care. Our goal is always to provide you with excellent care. Hearing back from our patients is one way we can continue to improve our services. " Please take a few minutes to complete the written survey that you may receive in the mail after your visit with us. Thank you!             Your Updated Medication List - Protect others around you: Learn how to safely use, store and throw away your medicines at www.disposemymeds.org.          This list is accurate as of: 10/25/17  2:50 PM.  Always use your most recent med list.                   Brand Name Dispense Instructions for use Diagnosis    albuterol 108 (90 BASE) MCG/ACT Inhaler    PROAIR HFA/PROVENTIL HFA/VENTOLIN HFA    18 g    Inhale 2 puffs into the lungs every 6 hours as needed for shortness of breath / dyspnea or wheezing    Chronic obstructive pulmonary disease, unspecified COPD type (H)       ASPIRIN LOW DOSE 81 MG EC tablet   Generic drug:  aspirin     30 tablet    Take 1 tablet (81 mg) by mouth daily    History of MI (myocardial infarction), Essential hypertension, benign       atorvastatin 40 MG tablet    LIPITOR    90 tablet    Take 1 tablet (40 mg) by mouth daily    Hyperlipidemia LDL goal <100       blood glucose monitoring lancets     1 Box    Test BS four times daily as directed    Type 2 diabetes mellitus without complication, with long-term current use of insulin (H)       blood glucose monitoring test strip    FREESTYLE LITE    50 strip    TEST BLOOD SUGAR TWO TIMES A DAY    Type 2 diabetes mellitus without complication, with long-term current use of insulin (H)       docusate sodium 100 MG capsule    COLACE    60 capsule    Take 1 capsule (100 mg) by mouth 2 times daily    Constipation, unspecified constipation type       EUCERIN CALMING DAILY MOIST Crea     1 Tube    Externally apply 1 dose * topically daily    Type II or unspecified type diabetes mellitus with neurological manifestations, not stated as uncontrolled(250.60) (H)       ferrous sulfate 325 (65 FE) MG tablet    IRON    100 tablet    Take 1 tablet (325 mg) by mouth daily (with breakfast)    Iron deficiency anemia, unspecified        furosemide 40 MG tablet    LASIX    30 tablet    Take 1 tablet (40 mg) by mouth daily    Hyperkalemia       glucose-vitamin C 4-0.006 G Chew    DEX4 GLUCOSE    50 tablet    Take 1 tablet (4 g) by mouth every hour as needed for low blood sugar    Controlled type 2 diabetes mellitus with stage 4 chronic kidney disease, with long-term current use of insulin (H)       hydrocortisone 1 % ointment     30 g    Apply sparingly to affected area three times daily for 14 days.    Rash       insulin glargine 100 UNIT/ML injection    LANTUS    15 mL    Inject 10 Units Subcutaneous every morning    Controlled type 2 diabetes mellitus with stage 4 chronic kidney disease, with long-term current use of insulin (H)       insulin pen needle 31G X 6 MM     120 each    Use as directed    Type 2 diabetes mellitus without complication (H)       levothyroxine 200 MCG tablet    SYNTHROID/LEVOTHROID    90 tablet    Take 1 tablet (200 mcg) by mouth See Admin Instructions New daily increase. Recheck labs in 8 weeks.    Other specified hypothyroidism       losartan 100 MG tablet    COZAAR    90 tablet    Take 1 tablet (100 mg) by mouth daily    Hypertension associated with diabetes (H)       metoprolol 25 MG 24 hr tablet    TOPROL-XL    90 tablet    Take 1 tablet (25 mg) by mouth daily    Hypertension associated with diabetes (H)       mometasone-formoterol 100-5 MCG/ACT oral inhaler    DULERA    1 Inhaler    Inhale 2 puffs into the lungs 2 times daily    COPD (chronic obstructive pulmonary disease) (H)       nitroGLYcerin 0.4 MG sublingual tablet    NITROSTAT    25 tablet    Place 1 tablet (0.4 mg) under the tongue every 5 minutes as needed for chest pain    History of MI (myocardial infarction)       nystatin 207066 UNIT/GM Powd    MYCOSTATIN    30 g    Apply 1 g topically 3 times daily as needed    Groin rash       * order for DME     1 Device    One wheeled walker with seat and brakes and basket    Risk for falls       * order for DME      2 Device    Equipment being ordered: two pairs moderate knee high support hose    Edema, unspecified type       oxyCODONE 10 MG IR tablet   Start taking on:  11/2/2017    ROXICODONE    90 tablet    Take 1 tablet (10 mg) by mouth every 8 hours as needed    Chronic low back pain without sciatica, unspecified back pain laterality       polyethylene glycol powder    MIRALAX    510 g    Take 17 g (1 capful) by mouth daily    Slow transit constipation       SM ALCOHOL PREP 70 % Pads     100 each    Externally apply 1 pad topically 4 times daily    Type 2 diabetes mellitus without complication, with long-term current use of insulin (H)       sodium bicarbonate 325 MG tablet     180 tablet    Take 2 tablets (650 mg) by mouth 3 times daily    Acidosis, CKD (chronic kidney disease) stage 4, GFR 15-29 ml/min (H)       tiotropium 18 MCG capsule    SPIRIVA HANDIHALER    90 capsule    Inhale contents of one capsule daily.    Pulmonary emphysema, unspecified emphysema type (H)       varenicline 0.5 MG X 11 & 1 MG X 42 tablet    CHANTIX STARTING MONTH GRETEL    53 tablet    Take 0.5 mg tab daily for 3 days, then 0.5 mg tab twice daily for 4 days, then 1 mg twice daily.    Encounter for smoking cessation counseling       vitamin D 2000 UNITS tablet     60 tablet    Take 2,000 Units by mouth daily    Vitamin D deficiency disease       * Notice:  This list has 2 medication(s) that are the same as other medications prescribed for you. Read the directions carefully, and ask your doctor or other care provider to review them with you.

## 2017-10-25 NOTE — PROGRESS NOTES
Psychosocial Assessment  Patient Name/ Age: Kim Anne 72 year old   Medical Record #: [id number]  Duration of Interview:     40  min  Process:   Face-to-Face Interview                (counseling < 50%)   Present at Appointment: Kim and her cousin Olga        : ANA Christianson, [facility] Date:  2017        Type of transplant: Kidney    Donor type:   Kim indicated she does not know of any potential donors at this time.   Cadaver   Prior Transplants:    No Status of Transplant:       Current Living Situation    Location:   [address] [place] [address]  With Whom: Son Ki (47), a granddaughter (15) and grandson (8)       Family/ Social Support:    Kim also has a daughter (45) but does not know where she lives.    Kim has three additional grandchildren and three great grandchildren.  She has two sisters who live in [place].   None available      Available, helpful   Committed relationship:  Kim indicated she is  and her ex- is .   None available   Other supports:   Extended family and friends Available, helpful       Activities/ Functional Ability    Current level:  Kim wears corrective lenses. Ambulatory, visually impaired and independent with ADL's     Transportation Drives self       Vocational/Employment/Financial     Employment   Retired   Job Description      Income   [facility] nursing [facility]   Insurance  Medicare Parts A and B, [employer] including Part D and does with [facility] for her medications.    At this time, patient can afford medication costs:  Yes  Medicare and [place]       Medical Status    Current mode of treatment for ESRD None   Complications - Diabetes controlled with insulin. Neuropathy       Behavioral    Tobacco Use Yes  Chemical Dependency No   Kim indicated she is smoking 10 cigarettes a day and is working on quitting.   Kim indicated she drank alcohol when younger but not currently.  Kim also  quit using marijuana five years ago.      Psychiatric Impairment No    Reading ability Good  Education level: 11th Grade Recent Legal History No    Coping Style/Strategies:   Kim indicated when under stress she will smoke.     Ability to Adhere to Complex Medical Regime: Yes     Adherence History:  Kim indicated she will usually follow her physician's recommendations.        Education  _X_ Medicare  _X_ Rehabilitation  _X_ Donor issues  _X_ Community resources  _X_ Post discharge housing  _X_ Financial resources  _X_ Medical insurance options  _X_ Psych adjustment  _X_ Family adjustment  _X_ Health Care Directive - Provided a copy and explained the purpose of the Health Care Directive. Psychosocial Risks of Transplant Reviewed and Discussed:  _X_ Increased stress related to emotional,            family, social, employment or financial           situation  _X_ Affect on work and/or disability benefits  _X_ Affect on future life insurance  _X_ Transplant outcome expectations may           not be met  _X_ Mental Health Risks: anxiety,           depression, PTSD, guilt, grief and           chronic fatigue     Notable Items:   Discussed having a conversation with [facility] to learn if they will be able to provide her with anti-rejection medications in a timely basis as this has been an issue in the past for some transplant patients.      Final Evaluation/Assessment   Patient seemed to process information well. Appeared informed, motivated and able to follow post transplant requirements. Behavior was appropriate during interview. Has adequate income and insurance coverage. Adequate social support. No major contraindications noted for transplant.  At this time patient appears to understand the risks and benefits of transplant.      Recommendation  Acceptable    Selection Criteria Met:  Plan for support Yes   Chemical Dependence Yes   Smoking No  Mental Health Yes   Adequate Finances Yes    Signature: Makenzie Galindo  ANA, [facility]   Title: Clinical

## 2017-10-25 NOTE — MR AVS SNAPSHOT
After Visit Summary   10/25/2017    Kim Anne    MRN: [id number]           Patient Information     Date Of Birth          1945        Visit Information        Provider Department      10/25/2017 7:50 AM  TRANSPLANT CLASS [facility] Solid Organ Transplant        Today's Diagnoses     Encounter for pre-transplant evaluation for kidney transplant    -  1       Follow-ups after your visit        Your next 10 appointments already scheduled     Oct 25, 2017  2:00 PM CDT   (Arrive by 1:45 PM)   ecg with  CV EKG   [facility] Xray ([facility])    [address] [place] [address] [id number] [phone]            Oct 25, 2017  3:00 PM CDT   Ech Complete with ECHCR2   [facility] Echo ([facility])    [address] [place] [address] [id number] [phone]           1. Please bring or wear a comfortable two-piece outfit. 2. You may eat, drink and take your normal medicines. 3. For any questions that cannot be answered, please contact the ordering physician            Nov 06, 2017 10:15 AM CST   LAB with  LAB   [facility] Lab ([facility])    [address] [place] [address] [id number] [phone]           Patient must bring picture ID. Patient should be prepared to give a urine specimen  Please do not eat 10-12 hours before your appointment if you are coming in fasting for labs on lipids, cholesterol, or glucose (sugar). Pregnant women should follow their Care Team instructions. Water with medications is okay. Do not drink coffee or other fluids. If you have concerns about taking  your medications, please ask at office or if scheduling via Groupsite, send a message by clicking on Secure Messaging, Message Your Care Team.            Nov 06, 2017 10:30 AM CST   US AORTIC IMAGING with UCUSV2   [facility] US ([facility])     [address] [place] [address] [id number] [phone]           Please bring a list of your medicines (including vitamins, minerals and over-the-counter drugs). Also, tell your doctor about any allergies you may have. Wear comfortable clothes and leave your valuables at home.  Adults: No eating or drinking for 8 hours before the exam. You may take medicine with a small sip of water.  Children: - Children 6+ years: No food or drink for 6 hours before exam. - Children 1-5 years: No food or drink for 4 hours before exam. - Infants, breast-fed: may have breast milk up to 2 hours before exam. - Infants, formula: may have bottle until 4 hours before exam.  Please call the Imaging Department at your exam site with any questions.            Nov 06, 2017 11:00 AM CST   US AORTA/IVC/ILIAC DUPLEX COMPLETE with UCUSV2   [facility] ([facility])    [address] [place] [address] [id number] [phone]           Please bring a list of your medicines (including vitamins, minerals and over-the-counter drugs). Also, tell your doctor about any allergies you may have. Wear comfortable clothes and leave your valuables at home.  Adults: No eating or drinking for 8 hours before the exam. You may take medicine with a small sip of water.  Children: - Children 6+ years: No food or drink for 6 hours before exam. - Children 1-5 years: No food or drink for 4 hours before exam. - Infants, breast-fed: may have breast milk up to 2 hours before exam. - Infants, formula: may have bottle until 4 hours before exam.  Please call the Imaging Department at your exam site with any questions.            Nov 06, 2017  2:30 PM CST   (Arrive by 2:15 PM)   NEW PANCREAS/KIDNEY TRANSPLANT WORK-UP with Kunal Nj MD   [facility] ([facility])    [address] [place] [address] [phone] [phone]            Dec 15, 2017  10:30 AM CST   (Arrive by 10:00 AM)   Return Visit with Wendy Espinal PA-C   [facility] ([facility])    [address] [phone]            Nadeem 15, 2018 10:30 AM CST   Return Visit with JUNIOR Rodriguez CNP   [facility] ([facility]    [address]  [facility] [phone]            Nadeem 15, 2018 10:30 AM CST   Return Visit with LAMINE Chahal   [facility] ([facility])    [address]  [address]  [facility] [phone]            Mar 29, 2018  8:00 AM CDT   (Arrive by 7:45 AM)   Return Visit with Margret Packer MD   [facility] ([facility])    [address] [phone] [phone]              Who to contact     If you have questions or need follow up information about today's clinic visit or your schedule please contact [facility] SOLID ORGAN TRANSPLANT directly at [phone].  Normal or non-critical lab and imaging results will be communicated to you by MyChart, letter or phone within 4 business days after the clinic has received the results. If you do not hear from us within 7 days, please contact the clinic through MyChart or phone. If you have a critical or abnormal lab result, we will notify you by phone as soon as possible.  Submit refill requests through Lingt or call your pharmacy and they will forward the refill request to us. Please allow 3 business days for your refill to be completed.          Additional Information About Your Visit        Lingt Information     Lingt lets you send messages to your doctor, view your test results, renew your prescriptions, schedule appointments and more. To sign up, go to [email or web address]  "Click on \"Log in\" on the left side of the screen, which will take you to the Welcome page. Then click on \"Sign up Now\" on the right side of the page.     You will be asked to enter the access code listed below, as well as some personal information. Please follow the directions to create your username and password.     Your access code is: [email or web address]  Expires: 2017  9:37 AM     Your access code will  in 90 days. If you need help or a new code, please call your [facility] clinic or [phone].        Care EveryWhere ID     This is your Care EveryWhere ID. This could be used by other organizations to access your [facility] medical records  [id number]         Blood Pressure from Last 3 Encounters:   10/25/17 179/77   10/20/17 140/58   10/13/17 174/70    Weight from Last 3 Encounters:   10/25/17 80.6 kg (177 lb 9.6 oz)   10/20/17 80.3 kg (177 lb)   10/13/17 80.6 kg (177 lb 9.6 oz)              Today, you had the following     No orders found for display       Primary Care Provider Office Phone # Fax #    Ailyn Bernstein Henry APRCRISTELA [phone]       [address] [facility] [address]  [employer] [facility] [address]        Equal Access to Services     CECIL TOLLIVER : Hadii alysha downingo Soluiz, waaxda luqadaha, qaybta kaalmada stephanie, kassandra white . So [facility] [phone].    ATENCIÓN: Si habla español, tiene a bailey disposición servicios gratuitos de asistencia lingüística. Vicenteame al [phone].    We comply with applicable federal civil rights laws and [place] laws. We do not discriminate on the basis of race, color, national origin, age, disability, sex, sexual orientation, or gender identity.            Thank you!     Thank you for choosing [facility] SOLID ORGAN TRANSPLANT  for your care. Our goal is always to provide you with excellent care. Hearing back from our patients is one way we can continue to improve our services. Please take a few minutes to complete the written survey " that you may receive in the mail after your visit with us. Thank you!             Your Updated Medication List - Protect others around you: Learn how to safely use, store and throw away your medicines at www.disposemymeds.org.          This list is accurate as of: 10/25/17  1:57 PM.  Always use your most recent med list.                   Brand Name Dispense Instructions for use Diagnosis    albuterol 108 (90 BASE) MCG/ACT Inhaler    PROAIR HFA/PROVENTIL HFA/VENTOLIN HFA    18 g    Inhale 2 puffs into the lungs every 6 hours as needed for shortness of breath / dyspnea or wheezing    Chronic obstructive pulmonary disease, unspecified COPD type (H)       ASPIRIN LOW DOSE 81 MG EC tablet   Generic drug:  aspirin     30 tablet    Take 1 tablet (81 mg) by mouth daily    History of MI (myocardial infarction), Essential hypertension, benign       atorvastatin 40 MG tablet    LIPITOR    90 tablet    Take 1 tablet (40 mg) by mouth daily    Hyperlipidemia LDL goal <100       blood glucose monitoring lancets     1 Box    Test BS four times daily as directed    Type 2 diabetes mellitus without complication, with long-term current use of insulin (H)       blood glucose monitoring test strip    FREESTYLE LITE    50 strip    TEST BLOOD SUGAR TWO TIMES A DAY    Type 2 diabetes mellitus without complication, with long-term current use of insulin (H)       docusate sodium 100 MG capsule    COLACE    60 capsule    Take 1 capsule (100 mg) by mouth 2 times daily    Constipation, unspecified constipation type       EUCERIN CALMING DAILY MOIST Crea     1 Tube    Externally apply 1 dose * topically daily    Type II or unspecified type diabetes mellitus with neurological manifestations, not stated as uncontrolled(250.60) (H)       ferrous sulfate 325 (65 FE) MG tablet    IRON    100 tablet    Take 1 tablet (325 mg) by mouth daily (with breakfast)    Iron deficiency anemia, unspecified       furosemide 40 MG tablet    LASIX    30 tablet     Take 1 tablet (40 mg) by mouth daily    Hyperkalemia       glucose-vitamin C 4-0.006 G Chew    DEX4 GLUCOSE    50 tablet    Take 1 tablet (4 g) by mouth every hour as needed for low blood sugar    Controlled type 2 diabetes mellitus with stage 4 chronic kidney disease, with long-term current use of insulin (H)       hydrocortisone 1 % ointment     30 g    Apply sparingly to affected area three times daily for 14 days.    Rash       insulin glargine 100 UNIT/ML injection    LANTUS    15 mL    Inject 10 Units Subcutaneous every morning    Controlled type 2 diabetes mellitus with stage 4 chronic kidney disease, with long-term current use of insulin (H)       insulin pen needle 31G X 6 MM     120 each    Use as directed    Type 2 diabetes mellitus without complication (H)       levothyroxine 200 MCG tablet    SYNTHROID/LEVOTHROID    90 tablet    Take 1 tablet (200 mcg) by mouth See Admin Instructions New daily increase. Recheck labs in 8 weeks.    Other specified hypothyroidism       losartan 100 MG tablet    COZAAR    90 tablet    Take 1 tablet (100 mg) by mouth daily    Hypertension associated with diabetes (H)       metoprolol 25 MG 24 hr tablet    TOPROL-XL    90 tablet    Take 1 tablet (25 mg) by mouth daily    Hypertension associated with diabetes (H)       mometasone-formoterol 100-5 MCG/ACT oral inhaler    DULERA    1 Inhaler    Inhale 2 puffs into the lungs 2 times daily    COPD (chronic obstructive pulmonary disease) (H)       nitroGLYcerin 0.4 MG sublingual tablet    NITROSTAT    25 tablet    Place 1 tablet (0.4 mg) under the tongue every 5 minutes as needed for chest pain    History of MI (myocardial infarction)       nystatin 665909 UNIT/GM Powd    MYCOSTATIN    30 g    Apply 1 g topically 3 times daily as needed    Groin rash       * order for DME     1 Device    One wheeled walker with seat and brakes and basket    Risk for falls       * order for DME     2 Device    Equipment being ordered: two pairs  moderate knee high support hose    Edema, unspecified type       oxyCODONE 10 MG IR tablet   Start taking on:  11/2/2017    ROXICODONE    90 tablet    Take 1 tablet (10 mg) by mouth every 8 hours as needed    Chronic low back pain without sciatica, unspecified back pain laterality       polyethylene glycol powder    MIRALAX    510 g    Take 17 g (1 capful) by mouth daily    Slow transit constipation       SM ALCOHOL PREP 70 % Pads     100 each    Externally apply 1 pad topically 4 times daily    Type 2 diabetes mellitus without complication, with long-term current use of insulin (H)       sodium bicarbonate 325 MG tablet     180 tablet    Take 2 tablets (650 mg) by mouth 3 times daily    Acidosis, CKD (chronic kidney disease) stage 4, GFR 15-29 ml/min (H)       tiotropium 18 MCG capsule    SPIRIVA HANDIHALER    90 capsule    Inhale contents of one capsule daily.    Pulmonary emphysema, unspecified emphysema type (H)       varenicline 0.5 MG X 11 & 1 MG X 42 tablet    CHANTIX STARTING MONTH GRETEL    53 tablet    Take 0.5 mg tab daily for 3 days, then 0.5 mg tab twice daily for 4 days, then 1 mg twice daily.    Encounter for smoking cessation counseling       vitamin D 2000 UNITS tablet     60 tablet    Take 2,000 Units by mouth daily    Vitamin D deficiency disease       * Notice:  This list has 2 medication(s) that are the same as other medications prescribed for you. Read the directions carefully, and ask your doctor or other care provider to review them with you.

## 2017-10-26 DIAGNOSIS — I15.0 RENOVASCULAR HYPERTENSION: ICD-10-CM

## 2017-10-26 LAB
HLA TYPING COMPLETE SOT RECIPIENT: NORMAL
INTERPRETATION ECG - MUSE: NORMAL
LA PPP-IMP: NEGATIVE
PRA SINGLE ANTIGEN IGG ANTIBODY: NORMAL

## 2017-10-26 RX ORDER — AMLODIPINE BESYLATE 5 MG/1
10 TABLET ORAL DAILY
Qty: 60 TABLET | Refills: 3 | Status: SHIPPED | OUTPATIENT
Start: 2017-10-26 | End: 2017-11-02

## 2017-10-27 LAB
COPATH REPORT: NORMAL
M TB TUBERC IFN-G BLD QL: POSITIVE
M TB TUBERC IFN-G/MITOGEN IGNF BLD: 2.12 IU/ML

## 2017-10-30 LAB
A* LOCUS NMDP: NORMAL
A* LOCUS: NORMAL
A* NMDP: NORMAL
ABTEST METHOD: NORMAL
B* LOCUS NMDP: NORMAL
B* LOCUS: NORMAL
B* NMDP: NORMAL
B*: NORMAL
BW-1: NORMAL
C* LOCUS NMDP: NORMAL
C* LOCUS: NORMAL
C* NMDP: NORMAL
C*: NORMAL
DPA1* LOCUS NMDP: NORMAL
DPA1*: NORMAL
DPA1*LOCUS: NORMAL
DPB1* LOCUS NMDP: NORMAL
DPB1* NMDP: NORMAL
DPB1*: NORMAL
DPB1*LOCUS: NORMAL
DQA1*: NORMAL
DQA1*LOCUS NMDP: NORMAL
DQA1*LOCUS: NORMAL
DQB1* LOCUS NMDP: NORMAL
DQB1* LOCUS: NORMAL
DQB1* NMDP: NORMAL
DQB1*: NORMAL
DRB1* LOCUS: NORMAL
DRB1* NMDP: NORMAL
DRB1*: NORMAL
DRB3* LOCUS NMDP: NORMAL
DRB3* LOCUS: NORMAL
DRB3* NMDP: NORMAL
DRSSO TEST METHOD: NORMAL

## 2017-10-31 DIAGNOSIS — D50.9 ANEMIA, IRON DEFICIENCY: Primary | ICD-10-CM

## 2017-10-31 RX ORDER — FERROUS SULFATE 325(65) MG
1 TABLET ORAL
Qty: 100 TABLET | Refills: 1 | Status: SHIPPED | OUTPATIENT
Start: 2017-10-31 | End: 2018-04-10

## 2017-11-01 NOTE — PROGRESS NOTES
Left another message for patient requesting a call back to make sure that she has started taking the Amlodipine 10 mg daily as prescribed.      Wendy Espinal PA-C

## 2017-11-02 ENCOUNTER — CARE COORDINATION (OUTPATIENT)
Dept: NEPHROLOGY | Facility: CLINIC | Age: 72
End: 2017-11-02

## 2017-11-02 DIAGNOSIS — I15.0 RENOVASCULAR HYPERTENSION: ICD-10-CM

## 2017-11-02 RX ORDER — AMLODIPINE BESYLATE 5 MG/1
10 TABLET ORAL DAILY
Qty: 60 TABLET | Refills: 3 | Status: SHIPPED | OUTPATIENT
Start: 2017-11-02 | End: 2018-08-16

## 2017-11-02 NOTE — PROGRESS NOTES
Called patient to see if she has started amlodipine 10 mg daily. Said she was not aware that a Rx was sent, but will pick it up today or tomorrow. Requested that it be sent to a different pharmacy, so I resent the Rx. Confirmed that patient checks her BP at home. Told her I will follow up with her in 1-2 weeks to monitor blood pressure readings. Patient verbalized understanding.    Ayush Oneal, RN, BAN  Nephrology RN Care Coordinator

## 2017-11-03 ENCOUNTER — CARE COORDINATION (OUTPATIENT)
Dept: CARE COORDINATION | Facility: CLINIC | Age: 72
End: 2017-11-03

## 2017-11-03 ENCOUNTER — CARE COORDINATION (OUTPATIENT)
Dept: GERIATRIC MEDICINE | Facility: CLINIC | Age: 72
End: 2017-11-03

## 2017-11-03 NOTE — PROGRESS NOTES
Arranged transportation thru [facility] for the below appt:  Appt Date & Time: 11/06 @ 10:30 am  Clinic Name & Address:  [address] [address]  Transportation Provider: [employer]   time:  9:30 am    Notified Kim of  time.    Brianna Barrios  Case Management Specialist  [facility]   [phone]

## 2017-11-03 NOTE — PROGRESS NOTES
Clinic Care Coordination Contact  [facility]/Voicemail    Referral Source: PCP  Clinical Data: Care Coordinator Outreach  Outreach attempted x 1.  Left message on voicemail with call back information and requested return call.  Plan: Care Coordinator will continue to follow patient.  SARABJIT Xiong    Care Coordinator  [facility]   [phone]

## 2017-11-06 ENCOUNTER — PRE VISIT (OUTPATIENT)
Dept: CARDIOLOGY | Facility: CLINIC | Age: 72
End: 2017-11-06

## 2017-11-09 ENCOUNTER — CARE COORDINATION (OUTPATIENT)
Dept: NEPHROLOGY | Facility: CLINIC | Age: 72
End: 2017-11-09

## 2017-11-09 NOTE — PROGRESS NOTES
Reason for Call    Left VM for patient to return call. Would like to check if patient has started amlodipine, review home BP readings, and remind patient that she is due for repeat labs next week.    Ayush Oneal RN

## 2017-11-14 LAB
SA1 CELL: NORMAL
SA1 COMMENTS: NORMAL
SA1 HI RISK ABY: NORMAL
SA1 MOD RISK ABY: NORMAL
SA1 TEST METHOD: NORMAL
SA2 CELL: NORMAL
SA2 COMMENTS: NORMAL
SA2 HI RISK ABY UA: NORMAL
SA2 MOD RISK ABY: NORMAL
SA2 TEST METHOD: NORMAL

## 2017-11-22 NOTE — PROGRESS NOTES
Called patient and reviewed home BP readings. She reports the following readings over the past week:    194/85 P 76  184/86 P 73  149/81 P 70  146/69 P 73  134/72 P 65  161/81 P 74    She's unsure of her weights. Reports some bilateral edema in her feet that occurs during the day, but goes away at night. Says this is not new for her.     She reports that she usually takes her BP reading prior to taking her morning medications, but otherwise she just takes it whenever she remembers. I advised trying to check her BP about an hour or 2 after taking her morning BP medications.     Confirmed she started amlodipine. Is also taking lasix, metoprolol, and losartan as prescribed.     Update sent to MATT Montiel. Pt has follow up appointment scheduled for 12/15.     Ayush Oneal RN

## 2017-11-29 ENCOUNTER — CARE COORDINATION (OUTPATIENT)
Dept: NEPHROLOGY | Facility: CLINIC | Age: 72
End: 2017-11-29

## 2017-11-29 NOTE — PROGRESS NOTES
Called patient to check on home BP readings this week. She says she does not have the readings she recorded, because her grandson ripped them out of her notebook and made paper airplanes out of them. She said she thought the readings were similar to last week.   Will follow up again next week to review readings.     Ayush Oneal RN

## 2017-11-30 DIAGNOSIS — M54.50 CHRONIC LOW BACK PAIN WITHOUT SCIATICA, UNSPECIFIED BACK PAIN LATERALITY: ICD-10-CM

## 2017-11-30 DIAGNOSIS — G89.29 CHRONIC LOW BACK PAIN WITHOUT SCIATICA, UNSPECIFIED BACK PAIN LATERALITY: ICD-10-CM

## 2017-11-30 DIAGNOSIS — K59.00 CONSTIPATION, UNSPECIFIED CONSTIPATION TYPE: ICD-10-CM

## 2017-11-30 DIAGNOSIS — E87.5 HYPERKALEMIA: ICD-10-CM

## 2017-11-30 RX ORDER — OXYCODONE HYDROCHLORIDE 10 MG/1
10 TABLET ORAL EVERY 8 HOURS PRN
Qty: 90 TABLET | Refills: 0 | Status: SHIPPED | OUTPATIENT
Start: 2017-12-02 | End: 2017-12-28

## 2017-11-30 RX ORDER — FUROSEMIDE 40 MG
40 TABLET ORAL DAILY
Qty: 30 TABLET | Refills: 3 | OUTPATIENT
Start: 2017-11-30

## 2017-11-30 RX ORDER — DOCUSATE SODIUM 100 MG/1
100 CAPSULE, LIQUID FILLED ORAL 2 TIMES DAILY
Qty: 60 CAPSULE | Refills: 4 | Status: SHIPPED | OUTPATIENT
Start: 2017-11-30 | End: 2018-06-25

## 2017-11-30 NOTE — TELEPHONE ENCOUNTER
furosemide (LASIX) 40 MG tablet  Refills available at [facility] pharmacy.    Juan Miles RN

## 2017-12-11 ENCOUNTER — CARE COORDINATION (OUTPATIENT)
Dept: NEPHROLOGY | Facility: CLINIC | Age: 72
End: 2017-12-11

## 2017-12-11 NOTE — PROGRESS NOTES
Left VM for patient to return call to review home BP readings.   Patient has a follow up appointment scheduled for this Friday. 12/15, so I asked that she bring her readings to that visit as well.    Ayush Oneal RN

## 2017-12-12 ENCOUNTER — CARE COORDINATION (OUTPATIENT)
Dept: GERIATRIC MEDICINE | Facility: CLINIC | Age: 72
End: 2017-12-12

## 2017-12-12 DIAGNOSIS — N18.4 CKD (CHRONIC KIDNEY DISEASE) STAGE 4, GFR 15-29 ML/MIN (H): Primary | ICD-10-CM

## 2017-12-12 NOTE — PROGRESS NOTES
Was notified that member switched to [facility] effective 12/1/17.  Call placed to member for transitional HRA.  Let voicemail requesting call back.   CM noted that member is in kidney transplant evaluation. Call placed to Ayush Oneal RN nephrology CC to follow up on condition.  States she is working with member on her BP.  Concerns of poor compliance with medications.  Ayush provided this CM with pre transplant CC contact information, Priscilla [phone].  Left voicemail with Jessica requesting call back.    Batool Mai RN, BSN, PHN  [facility]  [phone]  Fax: [phone]

## 2017-12-13 ENCOUNTER — CARE COORDINATION (OUTPATIENT)
Dept: GERIATRIC MEDICINE | Facility: CLINIC | Age: 72
End: 2017-12-13

## 2017-12-13 NOTE — PROGRESS NOTES
Arranged transportation thru [facility] for the below appt:  Appt Date & Time: 12/15 @ 9:30 am  Clinic Name & Address:  [address] [address]  Transportation Provider: [employer]   time:  8:30 am    Notified Kim of  time.    Brianna Barrios  Case Management Specialist  [facility]   [phone]

## 2017-12-13 NOTE — PROGRESS NOTES
[facility] to [facility] to [facility]  States she is not interested in a visit until after 1/1/18,  is too busy.  Hx of member canceling in the past but CM will contact member to schedule assessment after 1/1.    Member  is happy with her new PCP.  States she is very stressed right now because her son is in the hospital.  States related to complications from his DM and also lung issues.  States the lung issues are related to past drug use.  States her son blames her for his bad health and drug use.    States she still cares for her 8 year old grandson.  States he is helpful around the house.   States she feels her health is unchanged but does get tired with a lot of activity.  States she has 15 stairs to get upstairs to use the bathroom.   is able to do the stairs but is tired afterwards.   Jacqueline still drives, has a van.   Batool Mai RN, BSN, PHN  [facility]  [phone]  Fax: [phone]

## 2017-12-15 ENCOUNTER — OFFICE VISIT (OUTPATIENT)
Dept: NEPHROLOGY | Facility: CLINIC | Age: 72
End: 2017-12-15
Attending: PHYSICIAN ASSISTANT
Payer: COMMERCIAL

## 2017-12-15 VITALS
DIASTOLIC BLOOD PRESSURE: 77 MMHG | HEIGHT: 65 IN | BODY MASS INDEX: 29.41 KG/M2 | OXYGEN SATURATION: 95 % | WEIGHT: 176.5 LBS | HEART RATE: 77 BPM | SYSTOLIC BLOOD PRESSURE: 186 MMHG | TEMPERATURE: 98.3 F

## 2017-12-15 DIAGNOSIS — J43.9 PULMONARY EMPHYSEMA, UNSPECIFIED EMPHYSEMA TYPE (H): ICD-10-CM

## 2017-12-15 DIAGNOSIS — E87.20 ACIDOSIS: ICD-10-CM

## 2017-12-15 DIAGNOSIS — R60.9 EDEMA, UNSPECIFIED TYPE: Primary | ICD-10-CM

## 2017-12-15 DIAGNOSIS — I25.2 HISTORY OF MI (MYOCARDIAL INFARCTION): ICD-10-CM

## 2017-12-15 DIAGNOSIS — R03.0 ELEVATED BLOOD PRESSURE READING WITHOUT DIAGNOSIS OF HYPERTENSION: ICD-10-CM

## 2017-12-15 DIAGNOSIS — N18.4 CKD (CHRONIC KIDNEY DISEASE) STAGE 4, GFR 15-29 ML/MIN (H): ICD-10-CM

## 2017-12-15 DIAGNOSIS — E87.5 HYPERKALEMIA: ICD-10-CM

## 2017-12-15 LAB
ALBUMIN SERPL-MCNC: 2 G/DL (ref 3.4–5)
ANION GAP SERPL CALCULATED.3IONS-SCNC: 7 MMOL/L (ref 3–14)
BUN SERPL-MCNC: 30 MG/DL (ref 7–30)
CALCIUM SERPL-MCNC: 7.9 MG/DL (ref 8.5–10.1)
CHLORIDE SERPL-SCNC: 119 MMOL/L (ref 94–109)
CO2 SERPL-SCNC: 18 MMOL/L (ref 20–32)
CREAT SERPL-MCNC: 2.87 MG/DL (ref 0.52–1.04)
CREAT UR-MCNC: 55 MG/DL
GFR SERPL CREATININE-BSD FRML MDRD: 16 ML/MIN/1.7M2
GLUCOSE SERPL-MCNC: 98 MG/DL (ref 70–99)
HGB BLD-MCNC: 12.1 G/DL (ref 11.7–15.7)
PHOSPHATE SERPL-MCNC: 4.1 MG/DL (ref 2.5–4.5)
POTASSIUM SERPL-SCNC: 5.5 MMOL/L (ref 3.4–5.3)
PROT UR-MCNC: 9.56 G/L
PROT/CREAT 24H UR: 17.29 G/G CR (ref 0–0.2)
SODIUM SERPL-SCNC: 144 MMOL/L (ref 133–144)

## 2017-12-15 PROCEDURE — 84156 ASSAY OF PROTEIN URINE: CPT | Performed by: PHYSICIAN ASSISTANT

## 2017-12-15 PROCEDURE — 99213 OFFICE O/P EST LOW 20 MIN: CPT

## 2017-12-15 PROCEDURE — 80069 RENAL FUNCTION PANEL: CPT | Performed by: PHYSICIAN ASSISTANT

## 2017-12-15 PROCEDURE — 85018 HEMOGLOBIN: CPT | Performed by: PHYSICIAN ASSISTANT

## 2017-12-15 PROCEDURE — 36415 COLL VENOUS BLD VENIPUNCTURE: CPT | Performed by: PHYSICIAN ASSISTANT

## 2017-12-15 RX ORDER — NITROGLYCERIN 0.4 MG/1
0.4 TABLET SUBLINGUAL EVERY 5 MIN PRN
Qty: 25 TABLET | Refills: 0 | Status: SHIPPED | OUTPATIENT
Start: 2017-12-15 | End: 2019-08-22

## 2017-12-15 RX ORDER — FUROSEMIDE 40 MG
40 TABLET ORAL DAILY
Qty: 30 TABLET | Refills: 3 | Status: SHIPPED | OUTPATIENT
Start: 2017-12-15 | End: 2017-12-23

## 2017-12-15 RX ORDER — TIOTROPIUM BROMIDE 18 UG/1
CAPSULE ORAL; RESPIRATORY (INHALATION)
Qty: 90 CAPSULE | Refills: 3 | Status: SHIPPED | OUTPATIENT
Start: 2017-12-15 | End: 2018-01-15

## 2017-12-15 RX ORDER — SODIUM POLYSTYRENE SULFONATE 15 G/60ML
15 SUSPENSION ORAL; RECTAL ONCE
Qty: 60 ML | Refills: 0 | Status: SHIPPED | OUTPATIENT
Start: 2017-12-15 | End: 2017-12-15

## 2017-12-15 RX ORDER — SODIUM BICARBONATE 325 MG/1
650 TABLET ORAL 3 TIMES DAILY
Qty: 180 TABLET | Refills: 3 | Status: SHIPPED | OUTPATIENT
Start: 2017-12-15 | End: 2018-04-10

## 2017-12-15 ASSESSMENT — PAIN SCALES - GENERAL: PAINLEVEL: NO PAIN (0)
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
## 2017-12-15 NOTE — MR AVS SNAPSHOT
After Visit Summary   12/15/2017    Kim nAne    MRN: [id number]           Patient Information     Date Of Birth          1945        Visit Information        Provider Department      12/15/2017 10:30 AM Wendy Espinal PA-C [facility] Nephrology        Today's Diagnoses     Edema, unspecified type    -  1    Pulmonary emphysema, unspecified emphysema type (H)        Hyperkalemia        Acidosis        CKD (chronic kidney disease) stage 4, GFR 15-29 ml/min (H)        History of MI (myocardial infarction)          Care Instructions    1. Your blood pressure is very high per clinic reading.     -Please make sure that you are taking Lasix 40 mg once daily, Amlodipine 10 mg once daily, Losartan 100 mg once daily and Metoprolol XL 25 mg once daily.  -We will complete a 24 hour blood pressure test to determine if you are always this high and need additional medications.  -I agree that limiting stress is important and you should take strategies to help keep your home peaceful    2.  Continue to reduce smoking, try to cut back by 1-2 cigarettes every few days or once a week at minimum to meet your goal.    3.  Start taking sodium bicarbonate to reduce acid levels in your blood.    4.  To improve potassium levels.  Take Kaexylate 15 G one time.    Start taking Lasix 40 once daily   Continue to limit food rich in potassium as we discussed including fruits and vegetables like tomatoes, potatoes, broccoli, beans, dairy products like milk, cheese all need to be limited to keep your potassium from climbing higher.    5.  Repeat labs next week.    6.  I have have the nurse talk to you more about dialysis options.              Follow-ups after your visit        Follow-up notes from your care team     Return in about 2 months (around 2/15/2018).      Your next 10 appointments already scheduled     Dec 22, 2017 10:00 AM CST   LAB with  LAB    Health Lab ([facility])    [address] [id number] [phone]           Please do not eat 10-12 hours before your appointment if you are coming in fasting for labs on lipids, cholesterol, or glucose (sugar). This does not apply to pregnant women. Water, hot tea and black coffee (with nothing added) are okay. Do not drink other fluids, diet soda or chew gum.            Nadeem 15, 2018 10:30 AM CST   Return Visit with JUNIOR Bishop CNP   [facility] ([facility])    [address]  [address]  [facility] [address]   [phone]            Nadeem 15, 2018 10:30 AM CST   Return Visit with LAMINE Chahal   [facility] ([facility])    [address]  [address]  [facility] [id number] [phone]            Feb 16, 2018 10:00 AM CST   Lab with  LAB   [facility] ([facility])    [address] [place] [id number] [phone]            Feb 16, 2018 11:00 AM CST   (Arrive by 10:30 AM)   Return Visit with Wendy Espinal PA-C   [facility] Nephrology ([facility])    [address] [place] [id number] [phone]            Mar 29, 2018  7:30 AM CDT   PFT VISIT with  PFL B   [facility] Pulmonary Function Testing ([facility])    [address] [place] [id number] [phone]            Mar 29, 2018  8:00 AM CDT   (Arrive by 7:45 AM)   Return Visit with Margret Packer MD   [facility] ([facility])    [address] [place] [id number] [phone]              Who to contact     If you have questions or need follow up information about today's clinic visit or your schedule please contact [facility] NE[facility] directly at [phone].  Normal  "or non-critical lab and imaging results will be communicated to you by MyChart, letter or phone within 4 business days after the clinic has received the results. If you do not hear from us within 7 days, please contact the clinic through [employer] or phone. If you have a critical or abnormal lab result, we will notify you by phone as soon as possible.  Submit refill requests through [employer] or call your pharmacy and they will forward the refill request to us. Please allow 3 business days for your refill to be completed.          Additional Information About Your Visit        [employer] Information     [employer] lets you send messages to your doctor, view your test results, renew your prescriptions, schedule appointments and more. To sign up, go to [email or web address] . Click on \"Log in\" on the left side of the screen, which will take you to the Welcome page. Then click on \"Sign up Now\" on the right side of the page.     You will be asked to enter the access code listed below, as well as some personal information. Please follow the directions to create your username and password.     Your access code is: [email or web address]  Expires: 3/1/2018  6:30 AM     Your access code will  in 90 days. If you need help or a new code, please call your [place] clinic or [phone].        Care EveryWhere ID     This is your Care EveryWhere ID. This could be used by other organizations to access your [facility] medical records  [id number]        Your Vitals Were     Pulse Temperature Height Pulse Oximetry BMI (Body Mass Index)       77 98.3  F (36.8  C) (Oral) 1.651 m (5' 5\") 95% 29.37 kg/m2        Blood Pressure from Last 3 Encounters:   12/15/17 186/77   10/25/17 179/77   10/20/17 140/58    Weight from Last 3 Encounters:   12/15/17 80.1 kg (176 lb 8 oz)   10/25/17 80.6 kg (177 lb 9.6 oz)   10/20/17 80.3 kg (177 lb)              Today, you had the following     No orders found for display         Today's Medication Changes        "   These changes are accurate as of: 12/15/17 12:16 PM.  If you have any questions, ask your nurse or doctor.               Start taking these medicines.        Dose/Directions    sodium polystyrene 15 GM/60ML suspension   Commonly known as:  KAYEXALATE   Used for:  Hyperkalemia   Started by:  Wendy Espinal PA-C        Dose:  15 g   Take 60 mLs (15 g) by mouth once for 1 dose Avoid taking other oral medications 3 hours before or after this medication.   Quantity:  60 mL   Refills:  0            Where to get your medicines      These medications were sent to [facility] [place], [place] - [address] [facility]  [address] [facility] [address]     Phone:  [phone]     furosemide 40 MG tablet    nitroGLYcerin 0.4 MG sublingual tablet    sodium bicarbonate 325 MG tablet    sodium polystyrene 15 GM/60ML suspension    tiotropium 18 MCG capsule                Primary Care Provider Office Phone # Fax #    Ailyn CRISTELA Nieves, APRN [phone]       [address]  [facility] [address]        Equal Access to Services     [facility]: Hadii aad ku hadasho Soomaali, waaxda luqadaha, qaybta kaalmada adeegyafahad, kassandra white . So [facility] [phone].    ATENCIÓN: Si habla español, tiene a bailey disposición servicios gratuitos de asistencia lingüística. Vicenteame al [phone].    We comply with applicable federal civil rights laws and [place] laws. We do not discriminate on the basis of race, color, national origin, age, disability, sex, sexual orientation, or gender identity.            Thank you!     Thank you for choosing [facility] NE[facility]ROLOG[facility]  for your care. Our goal is always to provide you with excellent care. Hearing back from our patients is one way we can continue to improve our services. Please take a few minutes to complete the written survey that you may receive in the mail after your visit with us. Thank you!             Your Updated  Medication List - Protect others around you: Learn how to safely use, store and throw away your medicines at www.disposemymeds.org.          This list is accurate as of: 12/15/17 12:16 PM.  Always use your most recent med list.                   Brand Name Dispense Instructions for use Diagnosis    albuterol 108 (90 BASE) MCG/ACT Inhaler    PROAIR HFA/PROVENTIL HFA/VENTOLIN HFA    18 g    Inhale 2 puffs into the lungs every 6 hours as needed for shortness of breath / dyspnea or wheezing    Chronic obstructive pulmonary disease, unspecified COPD type (H)       amLODIPine 5 MG tablet    NORVASC    60 tablet    Take 2 tablets (10 mg) by mouth daily    Renovascular hypertension       ASPIRIN LOW DOSE 81 MG EC tablet   Generic drug:  aspirin     30 tablet    Take 1 tablet (81 mg) by mouth daily    History of MI (myocardial infarction), Essential hypertension, benign       atorvastatin 40 MG tablet    LIPITOR    90 tablet    Take 1 tablet (40 mg) by mouth daily    Hyperlipidemia LDL goal <100       blood glucose monitoring lancets     1 Box    Test BS four times daily as directed    Type 2 diabetes mellitus without complication, with long-term current use of insulin (H)       blood glucose monitoring test strip    FREESTYLE LITE    50 strip    TEST BLOOD SUGAR TWO TIMES A DAY    Type 2 diabetes mellitus without complication, with long-term current use of insulin (H)       docusate sodium 100 MG capsule    COLACE    60 capsule    Take 1 capsule (100 mg) by mouth 2 times daily    Constipation, unspecified constipation type       EUCERIN CALMING DAILY MOIST Crea     1 Tube    Externally apply 1 dose * topically daily    Type II or unspecified type diabetes mellitus with neurological manifestations, not stated as uncontrolled(250.60) (H)       ferrous sulfate 325 (65 FE) MG tablet    IRON    100 tablet    Take 1 tablet (325 mg) by mouth daily (with breakfast)    Anemia, iron deficiency       furosemide 40 MG tablet    LASIX     30 tablet    Take 1 tablet (40 mg) by mouth daily    Hyperkalemia, Edema, unspecified type       glucose-vitamin C 4-0.006 G Chew    DEX4 GLUCOSE    50 tablet    Take 1 tablet (4 g) by mouth every hour as needed for low blood sugar    Controlled type 2 diabetes mellitus with stage 4 chronic kidney disease, with long-term current use of insulin (H)       hydrocortisone 1 % ointment     30 g    Apply sparingly to affected area three times daily for 14 days.    Rash       insulin glargine 100 UNIT/ML injection    LANTUS    15 mL    Inject 10 Units Subcutaneous every morning    Controlled type 2 diabetes mellitus with stage 4 chronic kidney disease, with long-term current use of insulin (H)       insulin pen needle 31G X 6 MM     120 each    Use as directed    Type 2 diabetes mellitus without complication (H)       levothyroxine 200 MCG tablet    SYNTHROID/LEVOTHROID    90 tablet    Take 1 tablet (200 mcg) by mouth See Admin Instructions New daily increase. Recheck labs in 8 weeks.    Other specified hypothyroidism       losartan 100 MG tablet    COZAAR    90 tablet    Take 1 tablet (100 mg) by mouth daily    Hypertension associated with diabetes (H)       metoprolol 25 MG 24 hr tablet    TOPROL-XL    90 tablet    Take 1 tablet (25 mg) by mouth daily    Hypertension associated with diabetes (H)       mometasone-formoterol 100-5 MCG/ACT oral inhaler    DULERA    1 Inhaler    Inhale 2 puffs into the lungs 2 times daily    COPD (chronic obstructive pulmonary disease) (H)       nitroGLYcerin 0.4 MG sublingual tablet    NITROSTAT    25 tablet    Place 1 tablet (0.4 mg) under the tongue every 5 minutes as needed for chest pain    History of MI (myocardial infarction)       nystatin 897917 UNIT/GM Powd    MYCOSTATIN    30 g    Apply 1 g topically 3 times daily as needed    Groin rash       * order for DME     1 Device    One wheeled walker with seat and brakes and basket    Risk for falls       * order for DME     2 Device     Equipment being ordered: two pairs moderate knee high support hose    Edema, unspecified type       oxyCODONE IR 10 MG tablet    ROXICODONE    90 tablet    Take 1 tablet (10 mg) by mouth every 8 hours as needed    Chronic low back pain without sciatica, unspecified back pain laterality       polyethylene glycol powder    MIRALAX    510 g    Take 17 g (1 capful) by mouth daily    Slow transit constipation       SM ALCOHOL PREP 70 % Pads     100 each    Externally apply 1 pad topically 4 times daily    Type 2 diabetes mellitus without complication, with long-term current use of insulin (H)       sodium bicarbonate 325 MG tablet     180 tablet    Take 2 tablets (650 mg) by mouth 3 times daily    Acidosis, CKD (chronic kidney disease) stage 4, GFR 15-29 ml/min (H)       sodium polystyrene 15 GM/60ML suspension    KAYEXALATE    60 mL    Take 60 mLs (15 g) by mouth once for 1 dose Avoid taking other oral medications 3 hours before or after this medication.    Hyperkalemia       tiotropium 18 MCG capsule    SPIRIVA HANDIHALER    90 capsule    Inhale contents of one capsule daily.    Pulmonary emphysema, unspecified emphysema type (H)       vitamin D 2000 UNITS tablet     60 tablet    Take 2,000 Units by mouth daily    Vitamin D deficiency disease       * Notice:  This list has 2 medication(s) that are the same as other medications prescribed for you. Read the directions carefully, and ask your doctor or other care provider to review them with you.

## 2017-12-15 NOTE — PATIENT INSTRUCTIONS
1. Your blood pressure is very high per clinic reading.     -Please make sure that you are taking Lasix 40 mg once daily, Amlodipine 10 mg once daily, Losartan 100 mg once daily and Metoprolol XL 25 mg once daily.  -We will complete a 24 hour blood pressure test to determine if you are always this high and need additional medications.  -I agree that limiting stress is important and you should take strategies to help keep your home peaceful    2.  Continue to reduce smoking, try to cut back by 1-2 cigarettes every few days or once a week at minimum to meet your goal.    3.  Start taking sodium bicarbonate to reduce acid levels in your blood.    4.  To improve potassium levels.  Take Kaexylate 15 G one time.    Start taking Lasix 40 once daily   Continue to limit food rich in potassium as we discussed including fruits and vegetables like tomatoes, potatoes, broccoli, beans, dairy products like milk, cheese all need to be limited to keep your potassium from climbing higher.    5.  Repeat labs next week.    6.  I have have the nurse talk to you more about dialysis options.

## 2017-12-15 NOTE — NURSING NOTE
"Chief Complaint   Patient presents with     RECHECK     follow up  Diabetic nephropathy       Initial /77 (BP Location: Right arm, Patient Position: Sitting, Cuff Size: Adult Regular)  Pulse 77  Temp 98.3  F (36.8  C) (Oral)  Ht 1.651 m (5' 5\")  Wt 80.1 kg (176 lb 8 oz)  SpO2 95%  BMI 29.37 kg/m2 Estimated body mass index is 29.37 kg/(m^2) as calculated from the following:    Height as of this encounter: 1.651 m (5' 5\").    Weight as of this encounter: 80.1 kg (176 lb 8 oz).  Medication Reconciliation: complete    "

## 2017-12-18 ENCOUNTER — TELEPHONE (OUTPATIENT)
Dept: FAMILY MEDICINE | Facility: CLINIC | Age: 72
End: 2017-12-18

## 2017-12-18 DIAGNOSIS — I10 HTN (HYPERTENSION): Primary | ICD-10-CM

## 2017-12-18 DIAGNOSIS — N18.4 CKD (CHRONIC KIDNEY DISEASE) STAGE 4, GFR 15-29 ML/MIN (H): ICD-10-CM

## 2017-12-18 NOTE — TELEPHONE ENCOUNTER
Reason for Call:  Form, our goal is to have forms completed with 72 hours, however, some forms may require a visit or additional information.    Type of letter, form or note:  medical    Who is the form from?: Patient    Where did the form come from: Patient or family brought in       What clinic location was the form placed at?: [facility]    Where the form was placed: 's Box    What number is listed as a contact on the form?: [phone] Call Patient when form/paperwork has been mail to the DM[employer].        Additional comments: Mail form to [address] [place] [address], [place] [address]    The  [employer] Division is requesting a copy of the last Physical in order to reinstate her driving Privileges.     Call taken on 12/18/2017 at 11:29 AM by Cady Estevez
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
## 2017-12-20 ENCOUNTER — CARE COORDINATION (OUTPATIENT)
Dept: GERIATRIC MEDICINE | Facility: CLINIC | Age: 72
End: 2017-12-20

## 2017-12-20 NOTE — PROGRESS NOTES
Order placed for 24 hour blood pressure monitor.   Left VM for patient to return call to discuss scheduling.     Ayush Oneal RN
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
## 2017-12-21 ENCOUNTER — CARE COORDINATION (OUTPATIENT)
Dept: GERIATRIC MEDICINE | Facility: CLINIC | Age: 72
End: 2017-12-21

## 2017-12-21 DIAGNOSIS — Z45.2 ADJUSTMENT AND MANAGEMENT OF VASCULAR ACCESS DEVICE: ICD-10-CM

## 2017-12-21 DIAGNOSIS — N18.4 CHRONIC KIDNEY DISEASE, STAGE 4, SEVERELY DECREASED GFR (H): Primary | ICD-10-CM

## 2017-12-21 NOTE — PROGRESS NOTES
Arranged [facility] for the below appt:  Appt Date & Time: 12/22/17 @ 10:00am  Clinic Name & Address:  [facility] [address]  Transportation Provider: [employer]   time:  9:00am    Notified member of  time.    Justyna Mccain  Case Management Specialist  [facility]  [phone]

## 2017-12-22 ENCOUNTER — CARE COORDINATION (OUTPATIENT)
Dept: GERIATRIC MEDICINE | Facility: CLINIC | Age: 72
End: 2017-12-22

## 2017-12-22 ENCOUNTER — ALLIED HEALTH/NURSE VISIT (OUTPATIENT)
Dept: NEPHROLOGY | Facility: CLINIC | Age: 72
End: 2017-12-22
Attending: PHYSICIAN ASSISTANT
Payer: COMMERCIAL

## 2017-12-22 VITALS — SYSTOLIC BLOOD PRESSURE: 156 MMHG | DIASTOLIC BLOOD PRESSURE: 70 MMHG

## 2017-12-22 DIAGNOSIS — R03.0 ELEVATED BLOOD PRESSURE READING WITHOUT DIAGNOSIS OF HYPERTENSION: ICD-10-CM

## 2017-12-22 DIAGNOSIS — E87.5 HYPERKALEMIA: ICD-10-CM

## 2017-12-22 DIAGNOSIS — I10 HTN (HYPERTENSION): ICD-10-CM

## 2017-12-22 LAB
ANION GAP SERPL CALCULATED.3IONS-SCNC: 7 MMOL/L (ref 3–14)
BUN SERPL-MCNC: 37 MG/DL (ref 7–30)
CALCIUM SERPL-MCNC: 7.8 MG/DL (ref 8.5–10.1)
CHLORIDE SERPL-SCNC: 117 MMOL/L (ref 94–109)
CO2 SERPL-SCNC: 21 MMOL/L (ref 20–32)
CREAT SERPL-MCNC: 3.17 MG/DL (ref 0.52–1.04)
GFR SERPL CREATININE-BSD FRML MDRD: 14 ML/MIN/1.7M2
GLUCOSE SERPL-MCNC: 125 MG/DL (ref 70–99)
POTASSIUM SERPL-SCNC: 5.7 MMOL/L (ref 3.4–5.3)
SODIUM SERPL-SCNC: 145 MMOL/L (ref 133–144)

## 2017-12-22 PROCEDURE — 80048 BASIC METABOLIC PNL TOTAL CA: CPT | Performed by: PHYSICIAN ASSISTANT

## 2017-12-22 PROCEDURE — 93786 AMBL BP MNTR W/SW REC ONLY: CPT | Mod: ZF

## 2017-12-22 PROCEDURE — 36415 COLL VENOUS BLD VENIPUNCTURE: CPT | Performed by: PHYSICIAN ASSISTANT

## 2017-12-22 NOTE — MR AVS SNAPSHOT
After Visit Summary   12/22/2017    Kim Anne    MRN: [id number]           Patient Information     Date Of Birth          1945        Visit Information        Provider Department      12/22/2017 10:30 AM UC BP MONITOR 1 [facility]        Today's Diagnoses     Elevated blood pressure reading without diagnosis of hypertension        HTN (hypertension)           Follow-ups after your visit        Your next 10 appointments already scheduled     Nadeem 15, 2018 10:30 AM CST   Return Visit with JUNIOR Bishop CNP   [facility] ([facility])    [address]  [address]  [facility] [address]   [phone]            Nadeem 15, 2018 10:30 AM CST   Return Visit with LAMINE Chahal   [facility]    [address]  [address]  [facility] [address]   [phone]            Jan 16, 2018  8:30 AM CST   US UPPER EXTREMITY VENOUS MAPPING BILATERAL with UCUSV1   [facility] US ([facility])    [address] [place] [address]  1st [facility] [id number] [phone]           Please bring a list of your medicines (including vitamins, minerals and over-the-counter drugs). Also, tell your doctor about any allergies you may have. Wear comfortable clothes and leave your valuables at home.  You do not need to do anything special to prepare for your exam.  Please call the Imaging Department at your exam site with any questions.            Jan 16, 2018  9:40 AM CST   (Arrive by 9:25 AM)   Fistula Consult with Maverick Kramer MD   [facility] ([facility])    [address] [place] [address]  3rd [facility] [id number] [phone]            Feb 16, 2018 10:00 AM CST   Lab with  LAB   [facility] ([facility])    [address] [place] [address]  [facility] [address]   [phone]            Feb 16, 2018 11:00 AM CST   (Arrive by 10:30 AM)   Return Visit with Wendy Espinal PA-C   [facility] ([facility])    [address] [place] [address]   [phone]            Mar 29, 2018  7:30 AM CDT   PFT VISIT with GIOVANNA CADENA   [facility] Pulmonary Function Testing ([facility])    [address] [place] [address]   [phone]            Mar 29, 2018  8:00 AM CDT   (Arrive by 7:45 AM)   Return Visit with Margret Packer MD   [facility] ([facility])    [address] [place] [address]   [phone]              Future tests that were ordered for you today     Open Future Orders        Priority Expected Expires Ordered    US Upper Extremity Venous Mapping Bilateral Routine 12/21/2017 12/22/2018 12/21/2017            Who to contact     If you have questions or need follow up information about today's clinic visit or your schedule please contact [facility] NEPHROLOGY directly at [phone].  Normal or non-critical lab and imaging results will be communicated to you by [employer], letter or phone within 4 business days after the clinic has received the results. If you do not hear from us within 7 days, please contact the clinic through [employer] or phone. If you have a critical or abnormal lab result, we will notify you by phone as soon as possible.  Submit refill requests through [employer] or call your pharmacy and they will forward the refill request to us. Please allow 3 business days for your refill to be completed.          Additional Information About Your Visit        [employer] Information     [employer] lets you send messages to your doctor, view your test results, renew your prescriptions, schedule appointments and more. To sign up, go to [email or web address] .  "Click on \"Log in\" on the left side of the screen, which will take you to the Welcome page. Then click on \"Sign up Now\" on the right side of the page.     You will be asked to enter the access code listed below, as well as some personal information. Please follow the directions to create your username and password.     Your access code is: [email or web address]  Expires: 3/1/2018  6:30 AM     Your access code will  in 90 days. If you need help or a new code, please call your [place] clinic or [phone].        Care EveryWhere ID     This is your Care EveryWhere ID. This could be used by other organizations to access your [place] medical records  [id number]         Blood Pressure from Last 3 Encounters:   17 156/70   12/15/17 186/77   10/25/17 179/77    Weight from Last 3 Encounters:   12/15/17 80.1 kg (176 lb 8 oz)   10/25/17 80.6 kg (177 lb 9.6 oz)   10/20/17 80.3 kg (177 lb)              We Performed the Following     24 hour blood press monitor     24 hour blood press monitor        Primary Care Provider Office Phone # Fax #    Ailyn JUNIOR Pickett [phone]       [address] [facility] [address] [place] [address]        Equal Access to Services     CECIL TOLLIVER : Hadii alysha ku hadasho Soomaali, waaxda luqadaha, qaybta kaalmada adeelva, kassandra white . So [facility] [phone].    ATENCIÓN: Si habla español, tiene a bailey disposición servicios gratuitos de asistencia lingüística. Khadijah al [phone].    We comply with applicable federal civil rights laws and [place] laws. We do not discriminate on the basis of race, color, national origin, age, disability, sex, sexual orientation, or gender identity.            Thank you!     Thank you for choosing [facility] NE[facility]  for your care. Our goal is always to provide you with excellent care. Hearing back from our patients is one way we can continue to improve our services. Please take a few minutes to complete the " written survey that you may receive in the mail after your visit with us. Thank you!             Your Updated Medication List - Protect others around you: Learn how to safely use, store and throw away your medicines at www.NaturalMotion[employer]Cardeds.org.          This list is accurate as of: 12/22/17 11:32 AM.  Always use your most recent med list.                   Brand Name Dispense Instructions for use Diagnosis    albuterol 108 (90 BASE) MCG/ACT Inhaler    PROAIR HFA/PROVENTIL HFA/VENTOLIN HFA    18 g    Inhale 2 puffs into the lungs every 6 hours as needed for shortness of breath / dyspnea or wheezing    Chronic obstructive pulmonary disease, unspecified COPD type (H)       amLODIPine 5 MG tablet    NORVASC    60 tablet    Take 2 tablets (10 mg) by mouth daily    Renovascular hypertension       ASPIRIN LOW DOSE 81 MG EC tablet   Generic drug:  aspirin     30 tablet    Take 1 tablet (81 mg) by mouth daily    History of MI (myocardial infarction), Essential hypertension, benign       atorvastatin 40 MG tablet    LIPITOR    90 tablet    Take 1 tablet (40 mg) by mouth daily    Hyperlipidemia LDL goal <100       blood glucose monitoring lancets     1 Box    Test BS four times daily as directed    Type 2 diabetes mellitus without complication, with long-term current use of insulin (H)       blood glucose monitoring test strip    FREESTYLE LITE    50 strip    TEST BLOOD SUGAR TWO TIMES A DAY    Type 2 diabetes mellitus without complication, with long-term current use of insulin (H)       docusate sodium 100 MG capsule    COLACE    60 capsule    Take 1 capsule (100 mg) by mouth 2 times daily    Constipation, unspecified constipation type       EUCERIN CALMING DAILY MOIST Crea     1 Tube    Externally apply 1 dose * topically daily    Type II or unspecified type diabetes mellitus with neurological manifestations, not stated as uncontrolled(250.60) (H)       ferrous sulfate 325 (65 FE) MG tablet    IRON    100 tablet    Take 1  tablet (325 mg) by mouth daily (with breakfast)    Anemia, iron deficiency       furosemide 40 MG tablet    LASIX    30 tablet    Take 1 tablet (40 mg) by mouth daily    Hyperkalemia, Edema, unspecified type       glucose-vitamin C 4-0.006 G Chew    DEX4 GLUCOSE    50 tablet    Take 1 tablet (4 g) by mouth every hour as needed for low blood sugar    Controlled type 2 diabetes mellitus with stage 4 chronic kidney disease, with long-term current use of insulin (H)       hydrocortisone 1 % ointment     30 g    Apply sparingly to affected area three times daily for 14 days.    Rash       insulin glargine 100 UNIT/ML injection    LANTUS    15 mL    Inject 10 Units Subcutaneous every morning    Controlled type 2 diabetes mellitus with stage 4 chronic kidney disease, with long-term current use of insulin (H)       insulin pen needle 31G X 6 MM     120 each    Use as directed    Type 2 diabetes mellitus without complication (H)       levothyroxine 200 MCG tablet    SYNTHROID/LEVOTHROID    90 tablet    Take 1 tablet (200 mcg) by mouth See Admin Instructions New daily increase. Recheck labs in 8 weeks.    Other specified hypothyroidism       losartan 100 MG tablet    COZAAR    90 tablet    Take 1 tablet (100 mg) by mouth daily    Hypertension associated with diabetes (H)       metoprolol 25 MG 24 hr tablet    TOPROL-XL    90 tablet    Take 1 tablet (25 mg) by mouth daily    Hypertension associated with diabetes (H)       mometasone-formoterol 100-5 MCG/ACT oral inhaler    DULERA    1 Inhaler    Inhale 2 puffs into the lungs 2 times daily    COPD (chronic obstructive pulmonary disease) (H)       nitroGLYcerin 0.4 MG sublingual tablet    NITROSTAT    25 tablet    Place 1 tablet (0.4 mg) under the tongue every 5 minutes as needed for chest pain    History of MI (myocardial infarction)       nystatin 121952 UNIT/GM Powd    MYCOSTATIN    30 g    Apply 1 g topically 3 times daily as needed    Groin rash       * order for DME     1  Device    One wheeled walker with seat and brakes and basket    Risk for falls       * order for DME     2 Device    Equipment being ordered: two pairs moderate knee high support hose    Edema, unspecified type       oxyCODONE IR 10 MG tablet    ROXICODONE    90 tablet    Take 1 tablet (10 mg) by mouth every 8 hours as needed    Chronic low back pain without sciatica, unspecified back pain laterality       polyethylene glycol powder    MIRALAX    510 g    Take 17 g (1 capful) by mouth daily    Slow transit constipation       SM ALCOHOL PREP 70 % Pads     100 each    Externally apply 1 pad topically 4 times daily    Type 2 diabetes mellitus without complication, with long-term current use of insulin (H)       sodium bicarbonate 325 MG tablet     180 tablet    Take 2 tablets (650 mg) by mouth 3 times daily    Acidosis, CKD (chronic kidney disease) stage 4, GFR 15-29 ml/min (H)       tiotropium 18 MCG capsule    SPIRIVA HANDIHALER    90 capsule    Inhale contents of one capsule daily.    Pulmonary emphysema, unspecified emphysema type (H)       vitamin D 2000 UNITS tablet     60 tablet    Take 2,000 Units by mouth daily    Vitamin D deficiency disease       * Notice:  This list has 2 medication(s) that are the same as other medications prescribed for you. Read the directions carefully, and ask your doctor or other care provider to review them with you.

## 2017-12-22 NOTE — NURSING NOTE
Arm circumference: 26 cm  BP cuff size: Adult Medium  3 BP readings sitting in room: 156/70, 150/66, 149/75  Cuff to be placed on the Right arm (per Wendy, has subclavian stenosis on left side)  Time of initiation: 11:15 AM  Serial number: [id number]  Patient instructions: Reviewed with patient and give handout.    Patient will return monitor on Tuesday 12/26 due to long holiday weekend.    Ayush Oneal RN

## 2017-12-22 NOTE — PROGRESS NOTES
Arranged transportation thru [facility] for the below appt:  Appt Date & Time: 12/26 @ 10:30 am  Clinic Name & Address:  [facility] - [address] [place] [address]  Transportation Provider: [employer]   time:  9:30 am    Notified Kim of  time.    Brianna Barrios  Case Management Specialist  [facility]   [phone]

## 2017-12-23 ENCOUNTER — TELEPHONE (OUTPATIENT)
Dept: NEPHROLOGY | Facility: CLINIC | Age: 72
End: 2017-12-23

## 2017-12-23 DIAGNOSIS — R60.9 EDEMA, UNSPECIFIED TYPE: ICD-10-CM

## 2017-12-23 DIAGNOSIS — I15.0 RENOVASCULAR HYPERTENSION: ICD-10-CM

## 2017-12-23 DIAGNOSIS — E87.5 HYPERKALEMIA: Primary | ICD-10-CM

## 2017-12-23 RX ORDER — SODIUM POLYSTYRENE SULFONATE 15 G/60ML
30 SUSPENSION ORAL; RECTAL 2 TIMES DAILY
Qty: 240 ML | Refills: 0 | Status: SHIPPED | OUTPATIENT
Start: 2017-12-23 | End: 2017-12-24

## 2017-12-23 RX ORDER — FUROSEMIDE 40 MG
40 TABLET ORAL 2 TIMES DAILY
Qty: 60 TABLET | Refills: 3 | Status: SHIPPED | OUTPATIENT
Start: 2017-12-23 | End: 2018-04-10

## 2017-12-26 ENCOUNTER — VIRTUAL VISIT (OUTPATIENT)
Dept: NEPHROLOGY | Facility: CLINIC | Age: 72
End: 2017-12-26
Attending: PHYSICIAN ASSISTANT
Payer: COMMERCIAL

## 2017-12-26 DIAGNOSIS — I10 HTN (HYPERTENSION): Primary | ICD-10-CM

## 2017-12-26 DIAGNOSIS — R03.0 ELEVATED BLOOD PRESSURE READING WITHOUT DIAGNOSIS OF HYPERTENSION: Primary | ICD-10-CM

## 2017-12-26 PROCEDURE — 93788 AMBL BP MNTR W/SW A/R: CPT | Mod: ZF

## 2017-12-26 NOTE — MR AVS SNAPSHOT
After Visit Summary   12/26/2017    Kim Anne    MRN: [id number]           Patient Information     Date Of Birth          1945        Visit Information        Provider Department      12/26/2017 11:00 AM UC BP MONITOR 1 [facility]        Today's Diagnoses     HTN (hypertension)    -  1       Follow-ups after your visit        Your next 10 appointments already scheduled     Nadeem 15, 2018 10:30 AM CST   Return Visit with JUNIOR Bishop CNP   [facility] ([facility])    [address]  [address]  [facility] [place] [facility] [address]   [phone]            Nadeem 15, 2018 10:30 AM CST   Return Visit with LAMINE Chahal   [facility] ([facility])    [address]  [address]  [facility] [address] [phone]            Jan 16, 2018  8:30 AM CST   US UPPER EXTREMITY VENOUS MAPPING BILATERAL with UCUSV1   [facility] US ([facility])    [address] [place] [id number] [phone]           Please bring a list of your medicines (including vitamins, minerals and over-the-counter drugs). Also, tell your doctor about any allergies you may have. Wear comfortable clothes and leave your valuables at home.  You do not need to do anything special to prepare for your exam.  Please call the Imaging Department at your exam site with any questions.            Jan 16, 2018  9:40 AM CST   (Arrive by 9:25 AM)   Fistula Consult with Maverick Kramer MD   [facility] Solid Organ Transplant ([facility])    [address] [place] [id number] [phone]            Feb 16, 2018 10:00 AM CST   Lab with  LAB   [facility] Lab ([facility])    [address] [place] [id number] [phone]            Feb 16, 2018 11:00  "AM CST   (Arrive by 10:30 AM)   Return Visit with Wendy Espinal PA-C   [facility] ([facility])    [address] [address] [phone]            Mar 29, 2018  7:30 AM CDT   PFT VISIT with GIOVANNA CADENA   [facility] Pulmonary Function Testing ([facility])    [address] [place] [address] [phone]            Mar 29, 2018  8:00 AM CDT   (Arrive by 7:45 AM)   Return Visit with Margret Packer MD   [facility] ([facility])    [address] [place] [address] [phone]              Who to contact     If you have questions or need follow up information about today's clinic visit or your schedule please contact [facility] NEPHROLOGY directly at [phone].  Normal or non-critical lab and imaging results will be communicated to you by [employer], letter or phone within 4 business days after the clinic has received the results. If you do not hear from us within 7 days, please contact the clinic through [employer] or phone. If you have a critical or abnormal lab result, we will notify you by phone as soon as possible.  Submit refill requests through [employer] or call your pharmacy and they will forward the refill request to us. Please allow 3 business days for your refill to be completed.          Additional Information About Your Visit        [employer] Information     [employer] lets you send messages to your doctor, view your test results, renew your prescriptions, schedule appointments and more. To sign up, go to [email or web address] . Click on \"Log in\" on the left side of the screen, which will take you to the Welcome page. Then click on \"Sign up Now\" on the right side of the page.     You will be asked to enter the access code listed below, as well as some personal information. Please follow the directions to " create your username and password.     Your access code is: [email or web address]  Expires: 3/1/2018  6:30 AM     Your access code will  in 90 days. If you need help or a new code, please call your [facility] or [phone].        Care EveryWhere ID     This is your Care EveryWhere ID. This could be used by other organizations to access your [place] medical records  [id number]         Blood Pressure from Last 3 Encounters:   17 156/70   12/15/17 186/77   10/25/17 179/77    Weight from Last 3 Encounters:   12/15/17 80.1 kg (176 lb 8 oz)   10/25/17 80.6 kg (177 lb 9.6 oz)   10/20/17 80.3 kg (177 lb)              Today, you had the following     No orders found for display       Primary Care Provider Office Phone # Fax #    Ailyn JUNIOR Pickett [phone]       [address] [address]        Equal Access to Services     [facility]: Hadii aad ku hadasho Soomaali, waaxda luqadaha, qaybta kaalmada adeegyada, kassandra white . So [facility] [phone].    ATENCIÓN: Si habla español, tiene a bailey disposición servicios gratuitos de asistencia lingüística. Llame al [phone].    We comply with applicable federal civil rights laws and [place] laws. We do not discriminate on the basis of race, color, national origin, age, disability, sex, sexual orientation, or gender identity.            Thank you!     Thank you for choosing [facility] NEPHROLOGY  for your care. Our goal is always to provide you with excellent care. Hearing back from our patients is one way we can continue to improve our services. Please take a few minutes to complete the written survey that you may receive in the mail after your visit with us. Thank you!             Your Updated Medication List - Protect others around you: Learn how to safely use, store and throw away your medicines at www.disposemymeds.org.          This list is accurate as of: 17 11:53 AM.  Always use your  most recent med list.                   Brand Name Dispense Instructions for use Diagnosis    albuterol 108 (90 BASE) MCG/ACT Inhaler    PROAIR HFA/PROVENTIL HFA/VENTOLIN HFA    18 g    Inhale 2 puffs into the lungs every 6 hours as needed for shortness of breath / dyspnea or wheezing    Chronic obstructive pulmonary disease, unspecified COPD type (H)       amLODIPine 5 MG tablet    NORVASC    60 tablet    Take 2 tablets (10 mg) by mouth daily    Renovascular hypertension       ASPIRIN LOW DOSE 81 MG EC tablet   Generic drug:  aspirin     30 tablet    Take 1 tablet (81 mg) by mouth daily    History of MI (myocardial infarction), Essential hypertension, benign       atorvastatin 40 MG tablet    LIPITOR    90 tablet    Take 1 tablet (40 mg) by mouth daily    Hyperlipidemia LDL goal <100       blood glucose monitoring lancets     1 Box    Test BS four times daily as directed    Type 2 diabetes mellitus without complication, with long-term current use of insulin (H)       blood glucose monitoring test strip    FREESTYLE LITE    50 strip    TEST BLOOD SUGAR TWO TIMES A DAY    Type 2 diabetes mellitus without complication, with long-term current use of insulin (H)       docusate sodium 100 MG capsule    COLACE    60 capsule    Take 1 capsule (100 mg) by mouth 2 times daily    Constipation, unspecified constipation type       EUCERIN CALMING DAILY MOIST Crea     1 Tube    Externally apply 1 dose * topically daily    Type II or unspecified type diabetes mellitus with neurological manifestations, not stated as uncontrolled(250.60) (H)       ferrous sulfate 325 (65 FE) MG tablet    IRON    100 tablet    Take 1 tablet (325 mg) by mouth daily (with breakfast)    Anemia, iron deficiency       furosemide 40 MG tablet    LASIX    60 tablet    Take 1 tablet (40 mg) by mouth 2 times daily    Hyperkalemia, Edema, unspecified type       glucose-vitamin C 4-0.006 G Chew    DEX4 GLUCOSE    50 tablet    Take 1 tablet (4 g) by mouth every  hour as needed for low blood sugar    Controlled type 2 diabetes mellitus with stage 4 chronic kidney disease, with long-term current use of insulin (H)       hydrocortisone 1 % ointment     30 g    Apply sparingly to affected area three times daily for 14 days.    Rash       insulin glargine 100 UNIT/ML injection    LANTUS    15 mL    Inject 10 Units Subcutaneous every morning    Controlled type 2 diabetes mellitus with stage 4 chronic kidney disease, with long-term current use of insulin (H)       insulin pen needle 31G X 6 MM     120 each    Use as directed    Type 2 diabetes mellitus without complication (H)       levothyroxine 200 MCG tablet    SYNTHROID/LEVOTHROID    90 tablet    Take 1 tablet (200 mcg) by mouth See Admin Instructions New daily increase. Recheck labs in 8 weeks.    Other specified hypothyroidism       metoprolol 25 MG 24 hr tablet    TOPROL-XL    90 tablet    Take 1 tablet (25 mg) by mouth daily    Hypertension associated with diabetes (H)       mometasone-formoterol 100-5 MCG/ACT oral inhaler    DULERA    1 Inhaler    Inhale 2 puffs into the lungs 2 times daily    COPD (chronic obstructive pulmonary disease) (H)       nitroGLYcerin 0.4 MG sublingual tablet    NITROSTAT    25 tablet    Place 1 tablet (0.4 mg) under the tongue every 5 minutes as needed for chest pain    History of MI (myocardial infarction)       nystatin 764002 UNIT/GM Powd    MYCOSTATIN    30 g    Apply 1 g topically 3 times daily as needed    Groin rash       * order for DME     1 Device    One wheeled walker with seat and brakes and basket    Risk for falls       * order for DME     2 Device    Equipment being ordered: two pairs moderate knee high support hose    Edema, unspecified type       oxyCODONE IR 10 MG tablet    ROXICODONE    90 tablet    Take 1 tablet (10 mg) by mouth every 8 hours as needed    Chronic low back pain without sciatica, unspecified back pain laterality       polyethylene glycol powder    MIRALAX     510 g    Take 17 g (1 capful) by mouth daily    Slow transit constipation       SM ALCOHOL PREP 70 % Pads     100 each    Externally apply 1 pad topically 4 times daily    Type 2 diabetes mellitus without complication, with long-term current use of insulin (H)       sodium bicarbonate 325 MG tablet     180 tablet    Take 2 tablets (650 mg) by mouth 3 times daily    Acidosis, CKD (chronic kidney disease) stage 4, GFR 15-29 ml/min (H)       tiotropium 18 MCG capsule    SPIRIVA HANDIHALER    90 capsule    Inhale contents of one capsule daily.    Pulmonary emphysema, unspecified emphysema type (H)       vitamin D 2000 UNITS tablet     60 tablet    Take 2,000 Units by mouth daily    Vitamin D deficiency disease       * Notice:  This list has 2 medication(s) that are the same as other medications prescribed for you. Read the directions carefully, and ask your doctor or other care provider to review them with you.

## 2017-12-26 NOTE — NURSING NOTE
Date and time complete: 12/23 11:15 AM  Time went to bed: 11:00 PM  Time woke up: Not provided   Naps? How many and what times: 2:00-3:00 PM and 6:00-8:00 PM  Did you get up to use bathroom, how many times: 1  Did you get up for any reason, how many times: 4   Compared to usual, how well did you sleep?   Same as usual  BP medications taken and times for each: not recorded by patient, but she says she took her regular medications as prescribed    Ayush Oneal RN

## 2017-12-26 NOTE — MR AVS SNAPSHOT
After Visit Summary   12/26/2017    Kim Anne    MRN: [id number]           Patient Information     Date Of Birth          1945        Visit Information        Provider Department      12/26/2017 11:00 AM Fredy Mccoy MD [facility]        Today's Diagnoses     Elevated blood pressure reading without diagnosis of hypertension    -  1       Follow-ups after your visit        Your next 10 appointments already scheduled     Nadeem 15, 2018 10:30 AM CST   Return Visit with JUNIOR Bishop CNP   [facility]    [address]  [address]  [facility] [address]   [phone]            Nadeem 15, 2018 10:30 AM CST   Return Visit with LAMINE Chahal   [facility] ([facility]    [address]  [address]  [facility] [address]   [phone]            Jan 16, 2018  8:30 AM CST   US UPPER EXTREMITY VENOUS MAPPING BILATERAL with UCUSV1   [facility] ([facility])    [address] [place] [id number] [phone]           Please bring a list of your medicines (including vitamins, minerals and over-the-counter drugs). Also, tell your doctor about any allergies you may have. Wear comfortable clothes and leave your valuables at home.  You do not need to do anything special to prepare for your exam.  Please call the Imaging Department at your exam site with any questions.            Jan 16, 2018  9:40 AM CST   (Arrive by 9:25 AM)   Fistula Consult with Maverick Kramer MD   [facility] ([facility])    [address] [place] [id number] [phone]            Feb 16, 2018 10:00 AM CST   Lab with  LAB   [facility] ([facility])    [address] [place] [address]  [id number]   [phone]            Feb 16, 2018 11:00 AM CST   (Arrive by 10:30 AM)   Return Visit with Wendy Espinal PA-C   [facility] ([facility])    [address] [place] [phone]   [phone]            Mar 29, 2018  7:30 AM CDT   PFT VISIT with GIOVANNA PFL B   [facility] Pulmonary Function Testing ([facility])    [address] [place] [id number] [phone]            Mar 29, 2018  8:00 AM CDT   (Arrive by 7:45 AM)   Return Visit with Margret Packer MD   [facility] ([facility])    [address] [place] [phone] [phone]              Future tests that were ordered for you today     Open Future Orders        Priority Expected Expires Ordered    Basic metabolic panel Routine 12/27/2017 1/26/2018 12/27/2017            Who to contact     If you have questions or need follow up information about today's clinic visit or your schedule please contact [facility] NE[facility] directly at [phone].  Normal or non-critical lab and imaging results will be communicated to you by [employer], letter or phone within 4 business days after the clinic has received the results. If you do not hear from us within 7 days, please contact the clinic through [employer] or phone. If you have a critical or abnormal lab result, we will notify you by phone as soon as possible.  Submit refill requests through [employer] or call your pharmacy and they will forward the refill request to us. Please allow 3 business days for your refill to be completed.          Additional Information About Your Visit        [employer] Information     [employer] lets you send messages to your doctor, view your test results, renew your prescriptions, schedule appointments and more. To sign up, go to [email or web address] . Click on \"Log in\" on the left side of the " "screen, which will take you to the Welcome page. Then click on \"Sign up Now\" on the right side of the page.     You will be asked to enter the access code listed below, as well as some personal information. Please follow the directions to create your username and password.     Your access code is: [email or web address]  Expires: 3/1/2018  6:30 AM     Your access code will  in 90 days. If you need help or a new code, please call your [place] clinic or [phone].        Care EveryWhere ID     This is your Care EveryWhere ID. This could be used by other organizations to access your [place] medical records  [id number]         Blood Pressure from Last 3 Encounters:   17 156/70   12/15/17 186/77   10/25/17 179/77    Weight from Last 3 Encounters:   12/15/17 80.1 kg (176 lb 8 oz)   10/25/17 80.6 kg (177 lb 9.6 oz)   10/20/17 80.3 kg (177 lb)              We Performed the Following     REVIEW/REPORT BP RECORDING        Primary Care Provider Office Phone # Fax #    AilynJUNIOR Blackwell [phone]       [address] [place] [address]        Equal Access to Services     CECIL TOLLIVER : Hadii aad ku hadasho Soomaali, waaxda luqadaha, qaybta kaalmada adeegyada, kassandra eubanksin hayanastasiia white . So [facility] [phone].    ATENCIÓN: Si habla español, tiene a bailey disposición servicios gratuitos de asistencia lingüística. Llame al [phone].    We comply with applicable federal civil rights laws and [place] laws. We do not discriminate on the basis of race, color, national origin, age, disability, sex, sexual orientation, or gender identity.            Thank you!     Thank you for choosing [facility]  for your care. Our goal is always to provide you with excellent care. Hearing back from our patients is one way we can continue to improve our services. Please take a few minutes to complete the written survey that you may receive in the mail after your visit with us. Thank " you!             Your Updated Medication List - Protect others around you: Learn how to safely use, store and throw away your medicines at www.disposemymeds.org.          This list is accurate as of: 12/26/17 11:59 PM.  Always use your most recent med list.                   Brand Name Dispense Instructions for use Diagnosis    albuterol 108 (90 BASE) MCG/ACT Inhaler    PROAIR HFA/PROVENTIL HFA/VENTOLIN HFA    18 g    Inhale 2 puffs into the lungs every 6 hours as needed for shortness of breath / dyspnea or wheezing    Chronic obstructive pulmonary disease, unspecified COPD type (H)       amLODIPine 5 MG tablet    NORVASC    60 tablet    Take 2 tablets (10 mg) by mouth daily    Renovascular hypertension       ASPIRIN LOW DOSE 81 MG EC tablet   Generic drug:  aspirin     30 tablet    Take 1 tablet (81 mg) by mouth daily    History of MI (myocardial infarction), Essential hypertension, benign       atorvastatin 40 MG tablet    LIPITOR    90 tablet    Take 1 tablet (40 mg) by mouth daily    Hyperlipidemia LDL goal <100       blood glucose monitoring lancets     1 Box    Test BS four times daily as directed    Type 2 diabetes mellitus without complication, with long-term current use of insulin (H)       blood glucose monitoring test strip    FREESTYLE LITE    50 strip    TEST BLOOD SUGAR TWO TIMES A DAY    Type 2 diabetes mellitus without complication, with long-term current use of insulin (H)       docusate sodium 100 MG capsule    COLACE    60 capsule    Take 1 capsule (100 mg) by mouth 2 times daily    Constipation, unspecified constipation type       EUCERIN CALMING DAILY MOIST Crea     1 Tube    Externally apply 1 dose * topically daily    Type II or unspecified type diabetes mellitus with neurological manifestations, not stated as uncontrolled(250.60) (H)       ferrous sulfate 325 (65 FE) MG tablet    IRON    100 tablet    Take 1 tablet (325 mg) by mouth daily (with breakfast)    Anemia, iron deficiency        furosemide 40 MG tablet    LASIX    60 tablet    Take 1 tablet (40 mg) by mouth 2 times daily    Hyperkalemia, Edema, unspecified type       glucose-vitamin C 4-0.006 G Chew    DEX4 GLUCOSE    50 tablet    Take 1 tablet (4 g) by mouth every hour as needed for low blood sugar    Controlled type 2 diabetes mellitus with stage 4 chronic kidney disease, with long-term current use of insulin (H)       hydrocortisone 1 % ointment     30 g    Apply sparingly to affected area three times daily for 14 days.    Rash       insulin glargine 100 UNIT/ML injection    LANTUS    15 mL    Inject 10 Units Subcutaneous every morning    Controlled type 2 diabetes mellitus with stage 4 chronic kidney disease, with long-term current use of insulin (H)       insulin pen needle 31G X 6 MM     120 each    Use as directed    Type 2 diabetes mellitus without complication (H)       levothyroxine 200 MCG tablet    SYNTHROID/LEVOTHROID    90 tablet    Take 1 tablet (200 mcg) by mouth See Admin Instructions New daily increase. Recheck labs in 8 weeks.    Other specified hypothyroidism       metoprolol 25 MG 24 hr tablet    TOPROL-XL    90 tablet    Take 1 tablet (25 mg) by mouth daily    Hypertension associated with diabetes (H)       mometasone-formoterol 100-5 MCG/ACT oral inhaler    DULERA    1 Inhaler    Inhale 2 puffs into the lungs 2 times daily    COPD (chronic obstructive pulmonary disease) (H)       nitroGLYcerin 0.4 MG sublingual tablet    NITROSTAT    25 tablet    Place 1 tablet (0.4 mg) under the tongue every 5 minutes as needed for chest pain    History of MI (myocardial infarction)       nystatin 665602 UNIT/GM Powd    MYCOSTATIN    30 g    Apply 1 g topically 3 times daily as needed    Groin rash       * order for DME     1 Device    One wheeled walker with seat and brakes and basket    Risk for falls       * order for DME     2 Device    Equipment being ordered: two pairs moderate knee high support hose    Edema, unspecified type        oxyCODONE IR 10 MG tablet    ROXICODONE    90 tablet    Take 1 tablet (10 mg) by mouth every 8 hours as needed    Chronic low back pain without sciatica, unspecified back pain laterality       polyethylene glycol powder    MIRALAX    510 g    Take 17 g (1 capful) by mouth daily    Slow transit constipation       SM ALCOHOL PREP 70 % Pads     100 each    Externally apply 1 pad topically 4 times daily    Type 2 diabetes mellitus without complication, with long-term current use of insulin (H)       sodium bicarbonate 325 MG tablet     180 tablet    Take 2 tablets (650 mg) by mouth 3 times daily    Acidosis, CKD (chronic kidney disease) stage 4, GFR 15-29 ml/min (H)       tiotropium 18 MCG capsule    SPIRIVA HANDIHALER    90 capsule    Inhale contents of one capsule daily.    Pulmonary emphysema, unspecified emphysema type (H)       vitamin D 2000 UNITS tablet     60 tablet    Take 2,000 Units by mouth daily    Vitamin D deficiency disease       * Notice:  This list has 2 medication(s) that are the same as other medications prescribed for you. Read the directions carefully, and ask your doctor or other care provider to review them with you.

## 2017-12-27 ENCOUNTER — CARE COORDINATION (OUTPATIENT)
Dept: NEPHROLOGY | Facility: CLINIC | Age: 72
End: 2017-12-27

## 2017-12-27 DIAGNOSIS — E87.5 HYPERKALEMIA: Primary | ICD-10-CM

## 2017-12-28 ENCOUNTER — TELEPHONE (OUTPATIENT)
Dept: NEPHROLOGY | Facility: CLINIC | Age: 72
End: 2017-12-28

## 2017-12-28 DIAGNOSIS — M54.50 CHRONIC LOW BACK PAIN WITHOUT SCIATICA, UNSPECIFIED BACK PAIN LATERALITY: ICD-10-CM

## 2017-12-28 DIAGNOSIS — G89.29 CHRONIC LOW BACK PAIN WITHOUT SCIATICA, UNSPECIFIED BACK PAIN LATERALITY: ICD-10-CM

## 2017-12-28 NOTE — TELEPHONE ENCOUNTER
"Social Work Brief Intervention  [facility]    Data/Intervention:    Patient Name:  Kim Anne  /Age:  1945 (72 year old)    Visit Type: telephone  Referral Source: [facility]al Clinic  Reason for Referral:  Patient has concerns with an adult family member.    Collaborated With:    -Patient on the phone    Patient Concerns/Issues:    spoke with Patient today on the phone. Patient reported that her concern is with her adult son who has been living with her for \"years and years.\" Patient reported he is not physically or mentally well and yells a lot. Patient reported she feels safe and that the yelling is not verbal abuse. Patient reported her son just got out of [facility] for rehab for his medical condition and feels he was discharged too early and needs more help to get better. Patient would like to know how to get him back into TCU for rehab or to get him home care.    Intervention/Education/Resources Provided:   encouraged Patient to have a conversation about this with her son.  explained that due to HIPAA, she will need his consent to speak with his medical providers as home care or TCU readmission has to be ordered by his doctor. Patient reported she will see if her son will let her come with him to his next appointment.     Assessment/Plan:  Patient to speak with her son about concerns and see if he will allow her to speak with his medical providers.    Provided patient with contact information and availability to follow up as needed.    Shefali Cornejo [facility]  Outpatient Specialty Clinics  Direct Phone: [phone]  Pager:  [phone]            "

## 2017-12-28 NOTE — TELEPHONE ENCOUNTER
oxyCODONE IR (ROXICODONE) 10 MG tablet  Controlled Substance Refill Request    Last refill: 12/2/17 Per [place]    Last clinic visit: 10/20/17    Next appt: 1/15/17    Controlled substance agreement on file: Yes:  Date 2/16/17 with Dr. Babcock.    Documentation in problem list reviewed:  Yes    Processing:  Patient will  in clinic    RX [place] reviewed:  reviewed- no concerns  [place]:  [email or web address]      Juan Miles RN

## 2017-12-28 NOTE — PROGRESS NOTES
Ambulatory Blood Pressure Monitoring (ABPM) Report     Readings      Overall 48      Day 32      Night 16             Overall        AVG MIN MAX    Systolic 151 125 176    Diastolic 64 45 90     73 123    PP 87 69 108    Heart Rate 57 51 109                  Daytime        AVG MIN MAX    Systolic 155 136 176    Diastolic 68 53 90     86 123    PP 88 70 103    Heart Rate 58 51 109           % SBP >135 100.0%      % DBP >85 6.3%                    Nighttime        AVG MIN MAX    Systolic 144 125 166    Diastolic 58 45 77    MAP 92 73 112    PP 85 69 108    Heart Rate 55 51 61           % SBP >120 100.0%      % DBP >70 6.3%             Systolic dipping % 7.5%               ACC/AHA Cutoff    Current  for Hypertension   Overall 151/64  >125/75    Daytime 155/68  >130/80    Nighttime 144/58  >110/65      Assessment/Recommendations: 71yo F with elevated clinic and ambulatory BP with a non-dipping pattern.    A copy of the ABPM results were provided to the patient.    Fredy Mccoy MD  [phone]

## 2017-12-29 RX ORDER — OXYCODONE HYDROCHLORIDE 10 MG/1
10 TABLET ORAL EVERY 8 HOURS PRN
Qty: 90 TABLET | Refills: 0 | Status: SHIPPED | OUTPATIENT
Start: 2017-12-30 | End: 2018-01-30

## 2018-01-02 ENCOUNTER — CARE COORDINATION (OUTPATIENT)
Dept: GERIATRIC MEDICINE | Facility: CLINIC | Age: 73
End: 2018-01-02

## 2018-01-02 NOTE — PROGRESS NOTES
Per member request, CM called back to schedule annual assessment after the first of the year.  Member [facility] is not intersted in a home visit at this time.  States she is driving to [facility] to visit her sisters.  \[facility]\"" she will be there for approx 1 week.  Was agreeable to ETTA calling back in a few weeks to reschedule.   Batool Mai RN, BSN, N  [facility]  [phone]  Fax: [phone]

## 2018-01-03 ENCOUNTER — CARE COORDINATION (OUTPATIENT)
Dept: NEPHROLOGY | Facility: CLINIC | Age: 73
End: 2018-01-03

## 2018-01-03 DIAGNOSIS — I10 HTN (HYPERTENSION): Primary | ICD-10-CM

## 2018-01-03 DIAGNOSIS — N18.4 CKD (CHRONIC KIDNEY DISEASE) STAGE 4, GFR 15-29 ML/MIN (H): ICD-10-CM

## 2018-01-03 NOTE — PROGRESS NOTES
Received order from MATT Fair for patient to start hydralazine 25 mg BID due to elevated BPs evaluated by 24 hour ABPM.     Called patient and relayed recommendations. She verbalized understanding. Rx sent to pharmacy. Will follow up in 2 weeks to continue to monitor BP.    Also reminded patient that she needs to get labs rechecked to monitor hyperkalemia. Patient said she would be available to come in next week. Message sent to scheduling staff to help with appointment.    Went to sign order for hydralazine and got a notification for a documented allergy to hydralazine. Called patient back. She's not sure what kind of reaction she had; thinks it was either rash/ itching or that she may have blacked out from it. Will hold off on sending Rx and update Wendy about allergy.    Ayush Oneal RN

## 2018-01-04 ENCOUNTER — CARE COORDINATION (OUTPATIENT)
Dept: GERIATRIC MEDICINE | Facility: CLINIC | Age: 73
End: 2018-01-04

## 2018-01-04 NOTE — PROGRESS NOTES
Arranged transportation thru [facility] PAR for the below appt:  Appt Date & Time: 1/08 @ 12:15 pm  Clinic Name & Address:  [facility] [address] [address]  Transportation Provider: [employer]   time:  11:15 am    Notified Kim of  time.    Brianna Barrios  Case Management Specialist  [facility]   [phone]

## 2018-01-04 NOTE — PROGRESS NOTES
Reviewed with MATT Fair. She recommends the following:    She needs to see Cardiology so we can get a better idea of her degree of obstruction to help guide our mgmt.  I referred her as did the transplant team and looks like she no-showed or canceled.   Lets try again for an as soon as possible appt with Cards.  We can remind her on Monday when she comes for labs and I'd like her to see pharmacy again to reinforce the changes made over the Ke[place] holiday.     Called patient and relayed recommendations. Will send a message to clinic coordinators to help with cardiology appointment. Also placed referral and sent message to [facility] pharmacist for appointment.     Ayush Oneal RN

## 2018-01-05 ENCOUNTER — CARE COORDINATION (OUTPATIENT)
Dept: NEPHROLOGY | Facility: CLINIC | Age: 73
End: 2018-01-05

## 2018-01-05 DIAGNOSIS — N18.4 CHRONIC KIDNEY DISEASE, STAGE 4 (SEVERE) (H): ICD-10-CM

## 2018-01-05 DIAGNOSIS — I10 HTN (HYPERTENSION): Primary | ICD-10-CM

## 2018-01-05 RX ORDER — HYDRALAZINE HYDROCHLORIDE 25 MG/1
25 TABLET, FILM COATED ORAL 2 TIMES DAILY
Qty: 60 TABLET | Refills: 1 | Status: SHIPPED | OUTPATIENT
Start: 2018-01-05 | End: 2018-04-10

## 2018-01-05 NOTE — PROGRESS NOTES
Received verbal order from MATT Montiel for patient to go ahead and start hydralazine 25 mg BID. Wendy reviewed with Dr. Lopes. Patient should stop medication and call us if she experiences itching or rash. Reviewed with patient who verbalized understanding.    Patient will follow up with [facility] pharmacist and get labs on Monday.    Ayush Oneal RN

## 2018-01-08 ENCOUNTER — CARE COORDINATION (OUTPATIENT)
Dept: NEPHROLOGY | Facility: CLINIC | Age: 73
End: 2018-01-08

## 2018-01-08 ENCOUNTER — OFFICE VISIT (OUTPATIENT)
Dept: PHARMACY | Facility: CLINIC | Age: 73
End: 2018-01-08
Payer: COMMERCIAL

## 2018-01-08 ENCOUNTER — HOSPITAL ENCOUNTER (OUTPATIENT)
Facility: CLINIC | Age: 73
Setting detail: SPECIMEN
Discharge: HOME | End: 2018-01-08
Admitting: PHYSICIAN ASSISTANT
Payer: COMMERCIAL

## 2018-01-08 VITALS — DIASTOLIC BLOOD PRESSURE: 60 MMHG | HEART RATE: 60 BPM | SYSTOLIC BLOOD PRESSURE: 128 MMHG

## 2018-01-08 DIAGNOSIS — E87.5 HYPERKALEMIA: ICD-10-CM

## 2018-01-08 DIAGNOSIS — I15.1 HYPERTENSION SECONDARY TO OTHER RENAL DISORDERS: Primary | ICD-10-CM

## 2018-01-08 DIAGNOSIS — N18.4 CKD (CHRONIC KIDNEY DISEASE) STAGE 4, GFR 15-29 ML/MIN (H): ICD-10-CM

## 2018-01-08 DIAGNOSIS — E87.5 HYPERKALEMIA: Primary | ICD-10-CM

## 2018-01-08 DIAGNOSIS — Z71.6 ENCOUNTER FOR SMOKING CESSATION COUNSELING: ICD-10-CM

## 2018-01-08 LAB
ANION GAP SERPL CALCULATED.3IONS-SCNC: 6 MMOL/L (ref 3–14)
BUN SERPL-MCNC: 33 MG/DL (ref 7–30)
CALCIUM SERPL-MCNC: 7.5 MG/DL (ref 8.5–10.1)
CHLORIDE SERPL-SCNC: 116 MMOL/L (ref 94–109)
CO2 SERPL-SCNC: 20 MMOL/L (ref 20–32)
CREAT SERPL-MCNC: 3.08 MG/DL (ref 0.52–1.04)
GFR SERPL CREATININE-BSD FRML MDRD: 15 ML/MIN/1.7M2
GLUCOSE SERPL-MCNC: 92 MG/DL (ref 70–99)
POTASSIUM SERPL-SCNC: 5.4 MMOL/L (ref 3.4–5.3)
SODIUM SERPL-SCNC: 143 MMOL/L (ref 133–144)

## 2018-01-08 PROCEDURE — 99606 MTMS BY PHARM EST 15 MIN: CPT | Performed by: PHARMACIST

## 2018-01-08 PROCEDURE — 80048 BASIC METABOLIC PNL TOTAL CA: CPT

## 2018-01-08 PROCEDURE — 99607 MTMS BY PHARM ADDL 15 MIN: CPT | Performed by: PHARMACIST

## 2018-01-08 RX ORDER — SODIUM POLYSTYRENE SULFONATE 15 G/60ML
15 SUSPENSION ORAL; RECTAL DAILY
Qty: 120 ML | Refills: 0 | Status: SHIPPED | OUTPATIENT
Start: 2018-01-08 | End: 2018-01-09

## 2018-01-08 NOTE — MR AVS SNAPSHOT
After Visit Summary   1/8/2018    Kim Anne    MRN: [id number]           Patient Information     Date Of Birth          1945        Visit Information        Provider Department      1/8/2018 12:30 PM [facility]        Care Instructions    Recommendations from today's MTM visit:                                                      1. Set your quit date for tomorrow. Talk to your family, tell them you are trying to quit smoking so they do not buy you cigarettes. If this does not work, save your empty pack so they do not think you've run out.     2. Have your granddaughter help you set up a daily alarm on your cell phone to remind you to take your medications.     3. Bring in all medications in to next visit. We will go through them in person.    Next MTM visit: 1/16/18 at 11 AM after your other appointments. Bring in all your medications.     To schedule another MTM appointment, please call the clinic directly or you may call the MTM scheduling line at [phone] or toll-free at [phone].     My Clinical Pharmacist's contact information:                                                      It was a pleasure seeing you today!  Please feel free to contact me with any questions or concerns you have.      Denton Yuen, PharmD  [facility] Pharmacist    Phone: [phone]     You may receive a survey about the MTM services you received.  I would appreciate your feedback to help me serve you better in the future. Please fill it out and return it when you can. Your comments will be anonymous.            Follow-ups after your visit        Your next 10 appointments already scheduled     Jan 08, 2018  2:00 PM CST   LAB with [facility] ([facility])    [address] [place] [address] [phone]           Please do not eat 10-12 hours before your appointment if you are coming in  fasting for labs on lipids, cholesterol, or glucose (sugar). This does not apply to pregnant women. Water, hot tea and black coffee (with nothing added) are okay. Do not drink other fluids, diet soda or chew gum.            Nadeem 15, 2018 10:30 AM CST   Return Visit with JUNIOR Bishop CNP   [facility] ([facility])    [address]  [address]  [facility] [address] [phone]            Nadeem 15, 2018 10:30 AM CST   Return Visit with LAMINE Chahal   [facility] ([facility])    [address]  [address]  [facility] [address] [phone]            Jan 16, 2018  8:30 AM CST   US UPPER EXTREMITY VENOUS MAPPING BILATERAL with [id number]   [facility] ([facility])    [address]  [address]st [facility] [address] [phone]           Please bring a list of your medicines (including vitamins, minerals and over-the-counter drugs). Also, tell your doctor about any allergies you may have. Wear comfortable clothes and leave your valuables at home.  You do not need to do anything special to prepare for your exam.  Please call the Imaging Department at your exam site with any questions.            Jan 16, 2018  9:40 AM CST   (Arrive by 9:25 AM)   Fistula Consult with Maverick Kramer MD   [facility] ([facility])    [address]  [address]  [facility] [id number] [phone]            Jan 16, 2018 11:00 AM CST   (Arrive by 10:45 AM)   Office Visit with Denton Yuen RPH   [facility] Medication Therapy Management ([facility])    [address]  3rd Floor  [facility] [address] [phone]           Bring a current list of meds and any records pertaining to this visit. For Physicals, please bring immunization records and any  forms needing to be filled out. Please arrive 10 minutes early to complete paperwork.            Jan 29, 2018  1:00 PM CST   (Arrive by 12:45 PM)   NEW HEART FAILURE with Wyatt Schneider MD   [facility] ([facility])    [address] [place] [address]  [address]  [facility] [address] [phone]            Feb 16, 2018 10:00 AM CST   Lab with UC LAB   [facility] ([facility])    [address] [place] [address]  1st Floor  [facility] [id number] [phone]            Feb 16, 2018 11:00 AM CST   (Arrive by 10:30 AM)   Return Visit with Wendy Espinal PA-C   [facility] ([facility])    [address] [place] [address]  [address]  [facility] [address] [phone]            Mar 29, 2018  8:00 AM CDT   (Arrive by 7:45 AM)   Return Visit with Margret Packer MD   [facility] ([facility])    [address] [place] [address]  [address] [place] [id number] [phone]              Who to contact     If you have questions or need follow up information about today's clinic visit or your schedule please contact [facility] MEDICATION THERAPY MANAGEMENT directly at [phone].  Normal or non-critical lab and imaging results will be communicated to you by SP3Hhart, letter or phone within 4 business days after the clinic has received the results. If you do not hear from us within 7 days, please contact the clinic through SP3Hhart or phone. If you have a critical or abnormal lab result, we will notify you by phone as soon as possible.  Submit refill requests through General Blood or call your pharmacy and they will forward the refill request to us. Please allow 3 business days for your refill to be completed.          Additional Information About Your Visit        General Blood Information     General Blood lets you send messages to your doctor, view your test results, renew your prescriptions, schedule  "appointments and more. To sign up, go to [email or web address] . Click on \"Log in\" on the left side of the screen, which will take you to the Welcome page. Then click on \"Sign up Now\" on the right side of the page.     You will be asked to enter the access code listed below, as well as some personal information. Please follow the directions to create your username and password.     Your access code is: [email or web address]  Expires: 3/1/2018  6:30 AM     Your access code will  in 90 days. If you need help or a new code, please call your [place] clinic or [phone].        Care EveryWhere ID     This is your Care EveryWhere ID. This could be used by other organizations to access your [place] medical records  [id number]        Your Vitals Were     Pulse                   60            Blood Pressure from Last 3 Encounters:   18 128/60   17 156/70   12/15/17 186/77    Weight from Last 3 Encounters:   12/15/17 176 lb 8 oz (80.1 kg)   10/25/17 177 lb 9.6 oz (80.6 kg)   10/20/17 177 lb (80.3 kg)              Today, you had the following     No orders found for display       Primary Care Provider Office Phone # Fax #    Ailyn CRISTELA SeguraNieves APRCRISTELA [phone]       [address] [facility] [address]  [facility] [address]        Equal Access to Services     CECIL TOLLIVER : Hadii alysha ku hadasho Soomaali, waaxda luqadaha, qaybta kaalmada adeegyafahad, kassandra villa [facility] white . So [facility] [phone].    ATENCIÓN: Si habla español, tiene a bailey disposición servicios gratuitos de asistencia lingüística. Llame al [phone].    We comply with applicable federal civil rights laws and [place] laws. We do not discriminate on the basis of race, color, national origin, age, disability, sex, sexual orientation, or gender identity.            Thank you!     Thank you for choosing [facility] MEDICATION THERAPY MANAGEMENT  for your care. Our goal is always to provide you with excellent care. Hearing back " from our patients is one way we can continue to improve our services. Please take a few minutes to complete the written survey that you may receive in the mail after your visit with us. Thank you!             Your Updated Medication List - Protect others around you: Learn how to safely use, store and throw away your medicines at www.disposemymeds.org.          This list is accurate as of: 1/8/18  1:15 PM.  Always use your most recent med list.                   Brand Name Dispense Instructions for use Diagnosis    albuterol 108 (90 BASE) MCG/ACT Inhaler    PROAIR HFA/PROVENTIL HFA/VENTOLIN HFA    18 g    Inhale 2 puffs into the lungs every 6 hours as needed for shortness of breath / dyspnea or wheezing    Chronic obstructive pulmonary disease, unspecified COPD type (H)       amLODIPine 5 MG tablet    NORVASC    60 tablet    Take 2 tablets (10 mg) by mouth daily    Renovascular hypertension       ASPIRIN LOW DOSE 81 MG EC tablet   Generic drug:  aspirin     30 tablet    Take 1 tablet (81 mg) by mouth daily    History of MI (myocardial infarction), Essential hypertension, benign       atorvastatin 40 MG tablet    LIPITOR    90 tablet    Take 1 tablet (40 mg) by mouth daily    Hyperlipidemia LDL goal <100       blood glucose monitoring lancets     1 Box    Test BS four times daily as directed    Type 2 diabetes mellitus without complication, with long-term current use of insulin (H)       blood glucose monitoring test strip    FREESTYLE LITE    50 strip    TEST BLOOD SUGAR TWO TIMES A DAY    Type 2 diabetes mellitus without complication, with long-term current use of insulin (H)       docusate sodium 100 MG capsule    COLACE    60 capsule    Take 1 capsule (100 mg) by mouth 2 times daily    Constipation, unspecified constipation type       EUCERIN CALMING DAILY MOIST Crea     1 Tube    Externally apply 1 dose * topically daily    Type II or unspecified type diabetes mellitus with neurological manifestations, not stated  as uncontrolled(250.60) (H)       ferrous sulfate 325 (65 FE) MG tablet    IRON    100 tablet    Take 1 tablet (325 mg) by mouth daily (with breakfast)    Anemia, iron deficiency       furosemide 40 MG tablet    LASIX    60 tablet    Take 1 tablet (40 mg) by mouth 2 times daily    Hyperkalemia, Edema, unspecified type       glucose-vitamin C 4-0.006 G Chew    DEX4 GLUCOSE    50 tablet    Take 1 tablet (4 g) by mouth every hour as needed for low blood sugar    Controlled type 2 diabetes mellitus with stage 4 chronic kidney disease, with long-term current use of insulin (H)       hydrALAZINE 25 MG tablet    APRESOLINE    60 tablet    Take 1 tablet (25 mg) by mouth 2 times daily    HTN (hypertension), Chronic kidney disease, stage 4 (severe) (H)       hydrocortisone 1 % ointment     30 g    Apply sparingly to affected area three times daily for 14 days.    Rash       insulin glargine 100 UNIT/ML injection    LANTUS    15 mL    Inject 10 Units Subcutaneous every morning    Controlled type 2 diabetes mellitus with stage 4 chronic kidney disease, with long-term current use of insulin (H)       insulin pen needle 31G X 6 MM     120 each    Use as directed    Type 2 diabetes mellitus without complication (H)       levothyroxine 200 MCG tablet    SYNTHROID/LEVOTHROID    90 tablet    Take 1 tablet (200 mcg) by mouth See Admin Instructions New daily increase. Recheck labs in 8 weeks.    Other specified hypothyroidism       metoprolol 25 MG 24 hr tablet    TOPROL-XL    90 tablet    Take 1 tablet (25 mg) by mouth daily    Hypertension associated with diabetes (H)       mometasone-formoterol 100-5 MCG/ACT oral inhaler    DULERA    1 Inhaler    Inhale 2 puffs into the lungs 2 times daily    COPD (chronic obstructive pulmonary disease) (H)       nitroGLYcerin 0.4 MG sublingual tablet    NITROSTAT    25 tablet    Place 1 tablet (0.4 mg) under the tongue every 5 minutes as needed for chest pain    History of MI (myocardial  infarction)       nystatin 157158 UNIT/GM Powd    MYCOSTATIN    30 g    Apply 1 g topically 3 times daily as needed    Groin rash       * order for DME     1 Device    One wheeled walker with seat and brakes and basket    Risk for falls       * order for DME     2 Device    Equipment being ordered: two pairs moderate knee high support hose    Edema, unspecified type       oxyCODONE IR 10 MG tablet    ROXICODONE    90 tablet    Take 1 tablet (10 mg) by mouth every 8 hours as needed    Chronic low back pain without sciatica, unspecified back pain laterality       polyethylene glycol powder    MIRALAX    510 g    Take 17 g (1 capful) by mouth daily    Slow transit constipation       SM ALCOHOL PREP 70 % Pads     100 each    Externally apply 1 pad topically 4 times daily    Type 2 diabetes mellitus without complication, with long-term current use of insulin (H)       sodium bicarbonate 325 MG tablet     180 tablet    Take 2 tablets (650 mg) by mouth 3 times daily    Acidosis, CKD (chronic kidney disease) stage 4, GFR 15-29 ml/min (H)       tiotropium 18 MCG capsule    SPIRIVA HANDIHALER    90 capsule    Inhale contents of one capsule daily.    Pulmonary emphysema, unspecified emphysema type (H)       vitamin D 2000 UNITS tablet     60 tablet    Take 2,000 Units by mouth daily    Vitamin D deficiency disease       * Notice:  This list has 2 medication(s) that are the same as other medications prescribed for you. Read the directions carefully, and ask your doctor or other care provider to review them with you.

## 2018-01-08 NOTE — PROGRESS NOTES
Called patient to review labs.   Received verbal order from MATT Fair for Kayexalate due to hyperkalemia. Rx sent to pharmacy.  Patient indicated she's having a hard time knowing what she can and cannot eat in relation to low potassium diet. Will refer patient to our dietician.   Wendy is also considering starting Veltassa for ongoing management of hyperkalemia. Will follow up with patient to discuss further if this is the plan.     Ayush Oneal RN

## 2018-01-08 NOTE — PROGRESS NOTES
SUBJECTIVE/OBJECTIVE:                           Kim Anne is a 72 year old female coming in for an initial visit for Medication Therapy Management.  She was referred to me from MATT Montiel for HTN.     Chief Complaint: HTN and smoking cessation    Allergies/ADRs: Reviewed in Epic  Tobacco: 0-1 pack per day - is interested in quittingTobacco Cessation Action Plan: Medication Therapy Management  (Referral to MTM)  Alcohol: not currently using  Caffeine: 1 cups/day of coffee, 4 cups of green tea daily  Activity: Has a pinched nerve in her back, sometimes she loses feelings in her feet.   PMH: Reviewed in Epic    Medication Adherence/Access  The patient misses their medication 0-2 times per week, but patient is unable to describe fully how many she might miss.  Pt uses a pill box. Her grand daughter reminds her to take her meds. She took her meds this morning.   Patient is responsible for his/her own medications.   Adherence/Compliance is described as fair .  The patient fills general medications at [facility] pharmacy (on file).   Has the patient been offered to fill at [facility]? No  Other programs or coupons used: Unknown    Hypertension: Patient was unable to recall at home medication names and doses. Per Epic generated medication list (last updated 12/15/17), current medications include Amlodipine 5 mg once daliy, metoprolol 25 mg 24 hour tablet once daily, and hydralazine 25 mg tablet twice daily. Patient does self-monitor BP only taking when she feels upset. Last week she took it and recalled a systolic level of 239. She reports that she was stressed out with family matters at the time she took it.    Smoking Cessation:  How much does she smoke:  6-8 cigarettes daily  Why does she smoke: Stress reliever   Triggers for smoking include:  Stress, influenced by peers/family members  How long has she been smoking:  About 40 years  What is the most she ever smoked:  10-12 cigarettes  daily  Tried quitting in the past: Yes.  How many times:  6-7.  For how long: 3 months.  What worked/didn t work in the past: Cold turkey worked in the past.  Tried Nicotine patches, but continued smoking with use.   Why does she want to quit (motivators for quitting): avoid the unwanted smell of smoke and bad breath, granddaughter has asthma, help lower blood pressure, increase quality of life    Current labs include:  BP Readings from Last 3 Encounters:   01/08/18 128/60   12/22/17 156/70   12/15/17 186/77     Today's Vitals: /60  Pulse 60  Lab Results   Component Value Date    A1C 6.6 10/25/2017   .  Lab Results   Component Value Date    CHOL 214 10/25/2017     Lab Results   Component Value Date    TRIG 212 10/25/2017     Lab Results   Component Value Date    HDL 67 10/25/2017     Lab Results   Component Value Date     10/25/2017       Liver Function Studies -   Recent Labs   Lab Test  12/15/17   0942  10/25/17   0639   PROTTOTAL   --   5.8*   ALBUMIN  2.0*  1.9*   BILITOTAL   --   0.2   ALKPHOS   --   164*   AST   --   13   ALT   --   16       Lab Results   Component Value Date    UCRR 55 12/15/2017    MICROL 5320 07/07/2017    UMALCR 6325.80 (H) 07/07/2017       Last Basic Metabolic Panel:  Lab Results   Component Value Date     12/22/2017      Lab Results   Component Value Date    POTASSIUM 5.7 12/22/2017     Lab Results   Component Value Date    CHLORIDE 117 12/22/2017     Lab Results   Component Value Date    BUN 37 12/22/2017     Lab Results   Component Value Date    CR 3.17 12/22/2017     GFR Estimate   Date Value Ref Range Status   12/22/2017 14 (L) >60 mL/min/1.7m2 Final     Comment:     Non  GFR Calc   12/15/2017 16 (L) >60 mL/min/1.7m2 Final     Comment:     Non  GFR Calc   10/25/2017 17 (L) >60 mL/min/1.7m2 Final     Comment:     Non  GFR Calc     TSH   Date Value Ref Range Status   10/20/2017 1.04 0.40 - 4.00 mU/L Final     Most  Recent Immunizations   Administered Date(s) Administered     HepB 06/28/2012     Influenza (High Dose) 3 valent vaccine 09/28/2017     Influenza (IIV3) PF 11/04/2014     Pneumo Conj 13-V (2010&after) 01/05/2015     Pneumococcal 23 valent 04/01/2011     TD (ADULT, 7+) 07/12/2007     TDAP Vaccine (Adacel) 05/18/2011     Twinrix A/B 03/21/2012     Zoster vaccine, live 04/30/2012       ASSESSMENT:                               Medication Adherence: Unknown at time of consult. Patient forgot medications at home and could not recall the names or doses of what she is currently taking. A full review of home medications was not completed at time of visit.     Smoking Cessation: Uncontrolled. Patient would benefit from quitting smoking. Given history of quitting cold turkey, one option is to attempt this method again. If unsuccessful, some pharmacological treatment options to support smoking cessation include nicorette lozenges or nicotine patches.     Hypertension: Needs improvement. Per 2015 KDIGO guidelines, an appropriate blood pressure goal (given CKD stage 4 comorbidity + albumin in urine >30mg/g) is <130/80 mmHg. Patient could benefit from decreasing daily caffeine and tobacco intake to help better manage blood pressure levels.     If patient is adherent to current medication list and blood pressures remain elevated some pharmacological treatment options include:   1. Replace metoprolol-XL 25 mg once daily with carvedilol 3.125 mg twice daily.   2. Consider adding Veltassa to lower serum potassium levels <5. Dosing recommendations: Initial, 8.4 g orally once daily. Titrate in increments of 8.4 g at 1-week or longer intervals to goal serum potassium level. Dose may be increased or decreased as needed. MAX, 25.2 g/day. Once potassium levels within range (<4.5-5) consider adding an ACE-inhibitor, such as Lisinopril, to control blood pressure and renal protection.    PLAN:                            1. Recommend patient to  reduce smoking cigarettes from 6-8 cigarettes/day to 0 cigarettes/day with a goal to quit by tomorrow (1/9/18). Advise patient to keep an empty cigarette pack in purse to prevent friends from purchasing cigarettes for her. Instruct patient to let family members know about personal goal to increase support system at home.     2. Request patient to bring all medications (prescriptions, OTC's, inhalers, etc) to follow-up MTM appointment next week 1/16/18. Will plan to review home medications and adherance to optimize medications to better manage blood pressure. Attempt to reduce overall number of medications, if possible.    3. Recommend patient to monitor and record at home blood pressures daily while relaxed.     4. MATT Montiel, agreed with plan to start Veltassa. I will order it to get started on the PA process. Will talk with patient at next visit.     I spent 60 minutes with this patient today. I offer these suggestions for consideration by the nephrology. A copy of the visit note was provided to the patient's renal care provider.    Will follow up with patient with an MTM visit in one week on Tuesday 1/16/17.     The patient was given a summary of these recommendations as an after visit summary.     Latasha Reza, PharmD Student     Denton Yuen, PharmD  MTM Pharmacist    Phone: [phone]

## 2018-01-08 NOTE — PATIENT INSTRUCTIONS
Recommendations from today's MTM visit:                                                      1. Set your quit date for tomorrow. Talk to your family, tell them you are trying to quit smoking so they do not buy you cigarettes. If this does not work, save your empty pack so they do not think you've run out.     2. Have your granddaughter help you set up a daily alarm on your cell phone to remind you to take your medications.     3. Bring in all medications in to next visit. We will go through them in person.    Next MTM visit: 1/16/18 at 11 AM after your other appointments. Bring in all your medications.     To schedule another MTM appointment, please call the clinic directly or you may call the MTM scheduling line at [phone] or toll-free at [phone].     My Clinical Pharmacist's contact information:                                                      It was a pleasure seeing you today!  Please feel free to contact me with any questions or concerns you have.      Denton Yuen, PharmDAGO  MTM Pharmacist    Phone: [phone]     You may receive a survey about the MTM services you received.  I would appreciate your feedback to help me serve you better in the future. Please fill it out and return it when you can. Your comments will be anonymous.

## 2018-01-09 ENCOUNTER — CARE COORDINATION (OUTPATIENT)
Dept: GERIATRIC MEDICINE | Facility: CLINIC | Age: 73
End: 2018-01-09

## 2018-01-09 RX ORDER — SODIUM POLYSTYRENE SULFONATE 15 G/60ML
15 SUSPENSION ORAL; RECTAL DAILY
Qty: 120 ML | Refills: 0 | Status: SHIPPED | OUTPATIENT
Start: 2018-01-09 | End: 2018-01-11

## 2018-01-09 NOTE — PROGRESS NOTES
Arranged transportation thru [facility] PAR for the below appt:  Appt Date & Time: 1/15 @ 10:30 am  Clinic Name & Address:   [facility] ([address])  Transportation Provider: [employer]   time:  9:30 am    Arranged transportation thru are PAR for the below appt:  Appt Date & Time: 1/16 @ 8:30 am  Clinic Name & Address:  [address] [address]  Transportation Provider: [employer]   time:  7:30 am    Notified Kim of  time.    Brianna Barrios  Case Management Specialist  [facility]   [phone]

## 2018-01-10 NOTE — TELEPHONE ENCOUNTER
Form not found in paperwork. May have already been processed.  I will route to pcp to check status of medical form.  Anibal Anne MA

## 2018-01-11 NOTE — TELEPHONE ENCOUNTER
Paperwork was filled 10/3/2017 and in the media section. Please let me know if this in what was needed.   JUNIOR Ragland CNP

## 2018-01-15 ENCOUNTER — OFFICE VISIT (OUTPATIENT)
Dept: BEHAVIORAL HEALTH | Facility: CLINIC | Age: 73
End: 2018-01-15
Payer: COMMERCIAL

## 2018-01-15 ENCOUNTER — OFFICE VISIT (OUTPATIENT)
Dept: FAMILY MEDICINE | Facility: CLINIC | Age: 73
End: 2018-01-15
Payer: COMMERCIAL

## 2018-01-15 VITALS
SYSTOLIC BLOOD PRESSURE: 130 MMHG | WEIGHT: 175 LBS | OXYGEN SATURATION: 95 % | RESPIRATION RATE: 16 BRPM | DIASTOLIC BLOOD PRESSURE: 56 MMHG | TEMPERATURE: 99.7 F | BODY MASS INDEX: 29.12 KG/M2 | HEART RATE: 78 BPM

## 2018-01-15 DIAGNOSIS — F43.21 GRIEF: Primary | ICD-10-CM

## 2018-01-15 DIAGNOSIS — E03.9 HYPOTHYROIDISM, UNSPECIFIED TYPE: ICD-10-CM

## 2018-01-15 DIAGNOSIS — N18.4 TYPE 2 DIABETES MELLITUS WITH STAGE 4 CHRONIC KIDNEY DISEASE, WITH LONG-TERM CURRENT USE OF INSULIN (H): Primary | ICD-10-CM

## 2018-01-15 DIAGNOSIS — E87.5 HYPERKALEMIA: ICD-10-CM

## 2018-01-15 DIAGNOSIS — Z79.4 TYPE 2 DIABETES MELLITUS WITH STAGE 4 CHRONIC KIDNEY DISEASE, WITH LONG-TERM CURRENT USE OF INSULIN (H): Primary | ICD-10-CM

## 2018-01-15 DIAGNOSIS — E78.5 HYPERLIPIDEMIA LDL GOAL <100: ICD-10-CM

## 2018-01-15 DIAGNOSIS — E11.22 TYPE 2 DIABETES MELLITUS WITH STAGE 4 CHRONIC KIDNEY DISEASE, WITH LONG-TERM CURRENT USE OF INSULIN (H): Primary | ICD-10-CM

## 2018-01-15 DIAGNOSIS — J43.9 PULMONARY EMPHYSEMA, UNSPECIFIED EMPHYSEMA TYPE (H): ICD-10-CM

## 2018-01-15 DIAGNOSIS — R60.0 LOCALIZED EDEMA: ICD-10-CM

## 2018-01-15 PROCEDURE — 93000 ELECTROCARDIOGRAM COMPLETE: CPT | Performed by: NURSE PRACTITIONER

## 2018-01-15 PROCEDURE — 90832 PSYTX W PT 30 MINUTES: CPT | Performed by: SOCIAL WORKER

## 2018-01-15 PROCEDURE — 99214 OFFICE O/P EST MOD 30 MIN: CPT | Performed by: NURSE PRACTITIONER

## 2018-01-15 RX ORDER — TIOTROPIUM BROMIDE 18 UG/1
CAPSULE ORAL; RESPIRATORY (INHALATION)
Qty: 90 CAPSULE | Refills: 3 | Status: SHIPPED | OUTPATIENT
Start: 2018-01-15 | End: 2018-04-10

## 2018-01-15 RX ORDER — ATORVASTATIN CALCIUM 40 MG/1
40 TABLET, FILM COATED ORAL DAILY
Qty: 90 TABLET | Refills: 1 | Status: SHIPPED | OUTPATIENT
Start: 2018-01-15 | End: 2018-11-26

## 2018-01-15 NOTE — MR AVS SNAPSHOT
After Visit Summary   1/15/2018    Kim Anne    MRN: [id number]           Patient Information     Date Of Birth          1945        Visit Information        Provider Department      1/15/2018 10:30 AM Aviva Lackey LICSW [facility]        Today's Diagnoses     Grief    -  1       Follow-ups after your visit        Your next 10 appointments already scheduled     Jan 16, 2018  8:30 AM CST   US UPPER EXTREMITY VENOUS MAPPING BILATERAL with [id number]   [facility] US ([facility])    [address] [place] [address]  [address] [facility] [address] [phone]           Please bring a list of your medicines (including vitamins, minerals and over-the-counter drugs). Also, tell your doctor about any allergies you may have. Wear comfortable clothes and leave your valuables at home.  You do not need to do anything special to prepare for your exam.  Please call the Imaging Department at your exam site with any questions.            Jan 16, 2018  9:40 AM CST   (Arrive by 9:25 AM)   Fistula Consult with Maverick Kramer MD   [facility] ([facility])    [address] [place] [address]  [address] [place] [address] [phone]            Jan 16, 2018 11:00 AM CST   (Arrive by 10:45 AM)   Office Visit with Denton Yuen [facility] Medication Therapy Management ([facility])    [address] [place] [address]  [address] [facility] [address] [phone]           Bring a current list of meds and any records pertaining to this visit. For Physicals, please bring immunization records and any forms needing to be filled out. Please arrive 10 minutes early to complete paperwork.            Jan 16, 2018 12:30 PM CST   NUTRITION VISIT with Sasha Adams RD   [facility] ([facility])    [address] [place] [address]  [address]  [facility] [address]   [phone]            Jan 29, 2018  1:00 PM CST   (Arrive by 12:45 PM)   NEW HEART FAILURE with Wyatt Schneider MD   [facility] Heart Care ([facility])    [address] [facility]  [address]  [facility] [address]   [phone]            Feb 16, 2018 10:00 AM CST   Lab with  LAB   [facility] Lab ([facility])    [address] [place] [address]  1st Floor  [facility] [address]   [phone]            Feb 16, 2018 11:00 AM CST   (Arrive by 10:30 AM)   Return Visit with Wendy Espinal PA-C   [facility] Nephrology ([facility])    [address] [place] [address]  [address]  [facility] [address]   [phone]            Mar 29, 2018  7:30 AM CDT   PFT VISIT with  PFL B   [facility] Pulmonary Function Testing ([facility])    [address] [place] [address]  3rd Floor  [facility] [address]   [phone]            Mar 29, 2018  8:00 AM CDT   (Arrive by 7:45 AM)   Return Visit with Margret Packer MD   [facility] ([facility])    [address] [place] [address]  [address]  [facility] [address]   [phone]              Future tests that were ordered for you today     Open Future Orders        Priority Expected Expires Ordered    **TSH with free T4 reflex FUTURE 1yr Routine 12/16/2018 1/15/2019 1/15/2018            Who to contact     If you have questions or need follow up information about today's clinic visit or your schedule please contact [facility] directly at [phone].  Normal or non-critical lab and imaging results will be communicated to you by MyChart, letter or phone within 4 business days after the clinic has received the results. If you do not hear from us within 7 days, please contact the clinic through MyChart or phone. If you have a critical or abnormal lab result, we will notify you by phone as  "soon as possible.  Submit refill requests through [employer] or call your pharmacy and they will forward the refill request to us. Please allow 3 business days for your refill to be completed.          Additional Information About Your Visit        [facility] [employer] Information     In[employer] lets you send messages to your doctor, view your test results, renew your prescriptions, schedule appointments and more. To sign up, go to [email or web address] . Click on \"Log in\" on the left side of the screen, which will take you to the Welcome page. Then click on \"Sign up Now\" on the right side of the page.     You will be asked to enter the access code listed below, as well as some personal information. Please follow the directions to create your username and password.     Your access code is: [email or web address]  Expires: 3/1/2018  6:30 AM     Your access code will  in 90 days. If you need help or a new code, please call your [facility] clinic or [phone].        Care EveryWhere ID     This is your Care EveryWhere ID. This could be used by other organizations to access your [facility] medical records  [id number]         Blood Pressure from Last 3 Encounters:   01/15/18 130/56   18 128/60   17 156/70    Weight from Last 3 Encounters:   01/15/18 175 lb (79.4 kg)   12/15/17 176 lb 8 oz (80.1 kg)   10/25/17 177 lb 9.6 oz (80.6 kg)              Today, you had the following     No orders found for display         Today's Medication Changes          These changes are accurate as of: 1/15/18 11:04 AM.  If you have any questions, ask your nurse or doctor.               These medicines have changed or have updated prescriptions.        Dose/Directions    * order for DME   This may have changed:  Another medication with the same name was added. Make sure you understand how and when to take each.   Used for:  Risk for falls   Changed by:  Mai Babcock MD        One wheeled walker with seat and brakes and basket   Quantity:  1 " Device   Refills:  0       * order for DME   This may have changed:  Another medication with the same name was added. Make sure you understand how and when to take each.   Used for:  Edema, unspecified type   Changed by:  Nazario Mcneal PA-C        Equipment being ordered: two pairs moderate knee high support hose   Quantity:  2 Device   Refills:  1       * order for DME   This may have changed:  You were already taking a medication with the same name, and this prescription was added. Make sure you understand how and when to take each.   Used for:  Localized edema   Changed by:  Ailyn Nieves APRN CNP        Equipment being ordered: Compression socks.   Quantity:  2 each   Refills:  0       * Notice:  This list has 3 medication(s) that are the same as other medications prescribed for you. Read the directions carefully, and ask your doctor or other care provider to review them with you.         Where to get your medicines      These medications were sent to [facility] [address]  [address] [address]     Phone:  [phone]     tiotropium 18 MCG capsule         These medications were sent to [facility] [place], [place] - [address] [facility]  [address] [facility] [address]     Phone:  [phone]     atorvastatin 40 MG tablet         Some of these will need a paper prescription and others can be bought over the counter.  Ask your nurse if you have questions.     Bring a paper prescription for each of these medications     order for DME                Primary Care Provider Office Phone # Fax #    JUNIOR Bishop [phone]       [address]  [facility] [address]        Equal Access to Services     [facility] UNRULY : Hadii alysha graff Soluiz, waaxda luqadaha, qaybta kaalmark brown, kassandra shoemaker. So [facility] [phone].    ATENCIÓN: Tae dailey  español, tiene a bailey disposición servicios gratuitos de asistencia lingüística. Khadijah duran [phone].    We comply with applicable federal civil rights laws and [place] laws. We do not discriminate on the basis of race, color, national origin, age, disability, sex, sexual orientation, or gender identity.            Thank you!     Thank you for choosing [facility]  for your care. Our goal is always to provide you with excellent care. Hearing back from our patients is one way we can continue to improve our services. Please take a few minutes to complete the written survey that you may receive in the mail after your visit with us. Thank you!             Your Updated Medication List - Protect others around you: Learn how to safely use, store and throw away your medicines at www.disposemymeds.org.          This list is accurate as of: 1/15/18 11:04 AM.  Always use your most recent med list.                   Brand Name Dispense Instructions for use Diagnosis    albuterol 108 (90 BASE) MCG/ACT Inhaler    PROAIR HFA/PROVENTIL HFA/VENTOLIN HFA    18 g    Inhale 2 puffs into the lungs every 6 hours as needed for shortness of breath / dyspnea or wheezing    Chronic obstructive pulmonary disease, unspecified COPD type (H)       amLODIPine 5 MG tablet    NORVASC    60 tablet    Take 2 tablets (10 mg) by mouth daily    Renovascular hypertension       ASPIRIN LOW DOSE 81 MG EC tablet   Generic drug:  aspirin     30 tablet    Take 1 tablet (81 mg) by mouth daily    History of MI (myocardial infarction), Essential hypertension, benign       atorvastatin 40 MG tablet    LIPITOR    90 tablet    Take 1 tablet (40 mg) by mouth daily    Hyperlipidemia LDL goal <100       blood glucose monitoring lancets     1 Box    Test BS four times daily as directed    Type 2 diabetes mellitus without complication, with long-term current use of insulin (H)       blood glucose monitoring test strip    FREESTYLE LITE     50 strip    TEST BLOOD SUGAR TWO TIMES A DAY    Type 2 diabetes mellitus without complication, with long-term current use of insulin (H)       docusate sodium 100 MG capsule    COLACE    60 capsule    Take 1 capsule (100 mg) by mouth 2 times daily    Constipation, unspecified constipation type       EUCERIN CALMING DAILY MOIST Crea     1 Tube    Externally apply 1 dose * topically daily    Type II or unspecified type diabetes mellitus with neurological manifestations, not stated as uncontrolled(250.60) (H)       ferrous sulfate 325 (65 FE) MG tablet    IRON    100 tablet    Take 1 tablet (325 mg) by mouth daily (with breakfast)    Anemia, iron deficiency       furosemide 40 MG tablet    LASIX    60 tablet    Take 1 tablet (40 mg) by mouth 2 times daily    Hyperkalemia, Edema, unspecified type       glucose-vitamin C 4-0.006 G Chew    DEX4 GLUCOSE    50 tablet    Take 1 tablet (4 g) by mouth every hour as needed for low blood sugar    Controlled type 2 diabetes mellitus with stage 4 chronic kidney disease, with long-term current use of insulin (H)       hydrALAZINE 25 MG tablet    APRESOLINE    60 tablet    Take 1 tablet (25 mg) by mouth 2 times daily    HTN (hypertension), Chronic kidney disease, stage 4 (severe) (H)       insulin glargine 100 UNIT/ML injection    LANTUS    15 mL    Inject 10 Units Subcutaneous every morning    Controlled type 2 diabetes mellitus with stage 4 chronic kidney disease, with long-term current use of insulin (H)       insulin pen needle 31G X 6 MM     120 each    Use as directed    Type 2 diabetes mellitus without complication (H)       levothyroxine 200 MCG tablet    SYNTHROID/LEVOTHROID    90 tablet    Take 1 tablet (200 mcg) by mouth See Admin Instructions New daily increase. Recheck labs in 8 weeks.    Other specified hypothyroidism       metoprolol succinate 25 MG 24 hr tablet    TOPROL-XL    90 tablet    Take 1 tablet (25 mg) by mouth daily    Hypertension associated with diabetes  (H)       mometasone-formoterol 100-5 MCG/ACT oral inhaler    DULERA    1 Inhaler    Inhale 2 puffs into the lungs 2 times daily    COPD (chronic obstructive pulmonary disease) (H)       nitroGLYcerin 0.4 MG sublingual tablet    NITROSTAT    25 tablet    Place 1 tablet (0.4 mg) under the tongue every 5 minutes as needed for chest pain    History of MI (myocardial infarction)       nystatin 996298 UNIT/GM Powd    MYCOSTATIN    30 g    Apply 1 g topically 3 times daily as needed    Groin rash       * order for DME     1 Device    One wheeled walker with seat and brakes and basket    Risk for falls       * order for DME     2 Device    Equipment being ordered: two pairs moderate knee high support hose    Edema, unspecified type       * order for DME     2 each    Equipment being ordered: Compression socks.    Localized edema       oxyCODONE IR 10 MG tablet    ROXICODONE    90 tablet    Take 1 tablet (10 mg) by mouth every 8 hours as needed    Chronic low back pain without sciatica, unspecified back pain laterality       patiromer 8.4 G packet    VELTASSA    30 each    Take 8.4 g by mouth daily    Hyperkalemia       polyethylene glycol powder    MIRALAX    510 g    Take 17 g (1 capful) by mouth daily    Slow transit constipation       SM ALCOHOL PREP 70 % Pads     100 each    Externally apply 1 pad topically 4 times daily    Type 2 diabetes mellitus without complication, with long-term current use of insulin (H)       sodium bicarbonate 325 MG tablet     180 tablet    Take 2 tablets (650 mg) by mouth 3 times daily    Acidosis, CKD (chronic kidney disease) stage 4, GFR 15-29 ml/min (H)       tiotropium 18 MCG capsule    SPIRIVA HANDIHALER    90 capsule    Inhale contents of one capsule daily.    Pulmonary emphysema, unspecified emphysema type (H)       vitamin D 2000 UNITS tablet     60 tablet    Take 2,000 Units by mouth daily    Vitamin D deficiency disease       * Notice:  This list has 3 medication(s) that are the  same as other medications prescribed for you. Read the directions carefully, and ask your doctor or other care provider to review them with you.

## 2018-01-15 NOTE — MR AVS SNAPSHOT
After Visit Summary   1/15/2018    Kim Anne    MRN: [id number]           Patient Information     Date Of Birth          1945        Visit Information        Provider Department      1/15/2018 10:30 AM Ailyn Nieves APRN [facility] Integrated [facility]        Today's Diagnoses     Type 2 diabetes mellitus with stage 4 chronic kidney disease, with long-term current use of insulin (H)    -  1    Pulmonary emphysema, unspecified emphysema type (H)        Localized edema        Hyperlipidemia LDL goal <100        Hypothyroidism, unspecified type        Hyperkalemia          Care Instructions    -Use Dulera 2 puffs twice daily.   -Refill done.   -Call clinic with any questions or concerns.     Discharge Instructions: Eating a Low-Potassium Diet  Your healthcare provider has prescribed a low-potassium diet for you. This kind of diet is advised for people who have certain kidney problems. Potassium is needed for muscle function. But too much potassium is a health risk. Potassium is found in many foods. Read below to find out how to change your diet.  Foods to limit  Some foods are high in potassium. Limit your daily intake of the foods in the list below.    Fruits: apricots (canned and fresh), bananas, cantaloupe, honeydew melon, kiwi, nectarines, pomegranates, oranges, orange juice, pears, dried fruits (apricots, dates, figs, prunes), and prune juice    Vegetables: asparagus, avocado, artichoke, bamboo shoots, beets, brussels sprouts, cabbage, celery, chard, okra, potatoes (white and sweet), pumpkin, rutabaga, spinach (cooked), squash, tomato, tomato sauce, tomato juice, and vegetable juice cocktail    Legumes: black-eyed peas, chickpeas, lentils, lima beans, navy beans, red kidney beans, soybeans, and split peas    Nuts and seeds: almonds, Brazil nuts, cashews, peanuts, peanut butter, pecans, pumpkin seeds, sunflower seeds, and walnuts    Breads and cereals: bran and  whole-grain products    Dairy foods: milk, cheese, ice cream, yogurt    Animal protein: all forms of animal protein    Other: chocolate, cocoa, coconut milk, and molasses  Tips    Ask your healthcare provider how much potassium you are allowed each day. This will help you figure out serving sizes for your needs.    Check labels for potassium. It may be listed as potassium chloride.    Do not use salt substitutes. These often have potassium in them.    Cook frozen fruits and vegetables in water. Rinse and drain them well before eating.    Drain liquid from all canned fruits and vegetables. Rinse them before eating.    Reduce the potassium in potatoes. Peel them, slice thinly, and soak in water for at least 4 hours.    Reduce the potassium in green leafy vegetables. Soak them in water for at least 4 hours.    Eat white rice and refined white flour products. These include white bread, pasta, and grits.  Follow-up  Make a follow-up appointment as advised by your healthcare provider.  When to call your healthcare provider  Call your healthcare provider right away if you have any of the following:    Fatigue    Shortness of breath    Chest pain    Slow, irregular heartbeat    Fainting    Dizziness    Lightheadedness    Confusion   Date Last Reviewed: 6/1/2017 2000-2017 [employer]. [address] [place] [address]. All rights reserved. This information is not intended as a substitute for professional medical care. Always follow your healthcare professional's instructions.                Follow-ups after your visit        Your next 10 appointments already scheduled     Jan 29, 2018  1:00 PM CST   (Arrive by 12:45 PM)   NEW HEART FAILURE with Wyatt Schneider MD   [facility] ([facility])    [address] [place] [address]  [address] [place] [id number] [phone]            Feb 12, 2018 11:30 AM CST   Return Visit with LAMINE Chahal   [facility] [place] [facility]  Integrated Primary [facility] ([facility])    [address]  [address]  [facility] [address]   [phone]            Feb 12, 2018 11:30 AM CST   Return Visit with JUNIOR Bishop CNP   [facility] ([facility])    [address]  [address]  [facility] [address]   [phone]            Feb 16, 2018 10:00 AM CST   Lab with  LAB   [facility] ([facility])    [address] [facility] [address]  1st Floor  [facility] [address]   [phone]            Feb 16, 2018 11:00 AM CST   (Arrive by 10:30 AM)   Return Visit with Wendy Espinal PA-C   [facility] Nephrology ([facility])    [address] [facility] [address]  [address]  [facility] [id number] [phone]            Feb 16, 2018 11:30 AM CST   (Arrive by 11:15 AM)   [facility]    [address] [facility] [address]  3rd Floor  [facility] [address] [phone]            Mar 29, 2018  7:30 AM CDT   PFT VISIT with  PFL TAMMI   [facility] Pulmonary Function Testing ([facility])    [address] [facility] [address]  3rd Floor  [facility] [id number] [phone]            Mar 29, 2018  8:00 AM CDT   (Arrive by 7:45 AM)   Return Visit with Margret Packer MD   [facility] ([facility])    [address] [place] [address]  [address]  [facility] [id number] [phone]              Who to contact     If you have questions or need follow up information about today's clinic visit or your schedule please contact [facility] directly at [phone].  Normal or non-critical lab and imaging results will be communicated to you by MyChart, letter or phone within 4 business days after the clinic has received the results. If you do not hear from us  "within 7 days, please contact the clinic through [employer] or phone. If you have a critical or abnormal lab result, we will notify you by phone as soon as possible.  Submit refill requests through [employer] or call your pharmacy and they will forward the refill request to us. Please allow 3 business days for your refill to be completed.          Additional Information About Your Visit        [employer] Information     [employer] lets you send messages to your doctor, view your test results, renew your prescriptions, schedule appointments and more. To sign up, go to [email or web address] . Click on \"Log in\" on the left side of the screen, which will take you to the Welcome page. Then click on \"Sign up Now\" on the right side of the page.     You will be asked to enter the access code listed below, as well as some personal information. Please follow the directions to create your username and password.     Your access code is: [email or web address]  Expires: 3/1/2018  6:30 AM     Your access code will  in 90 days. If you need help or a new code, please call your [place] clinic or [phone].        Care EveryWhere ID     This is your Care EveryWhere ID. This could be used by other organizations to access your [place] medical records  [id number]        Your Vitals Were     Pulse Temperature Respirations Pulse Oximetry BMI (Body Mass Index)       78 99.7  F (37.6  C) (Oral) 16 95% 29.12 kg/m2        Blood Pressure from Last 3 Encounters:   18 144/74   01/15/18 130/56   18 128/60    Weight from Last 3 Encounters:   01/15/18 175 lb (79.4 kg)   12/15/17 176 lb 8 oz (80.1 kg)   10/25/17 177 lb 9.6 oz (80.6 kg)              We Performed the Following     EKG 12-lead complete w/read - Clinics          Today's Medication Changes          These changes are accurate as of 1/15/18 11:59 PM.  If you have any questions, ask your nurse or doctor.               These medicines have changed or have updated prescriptions.        " Dose/Directions    * order for DME   This may have changed:  Another medication with the same name was added. Make sure you understand how and when to take each.   Used for:  Risk for falls   Changed by:  Ailyn Nieves APRN CNP        One wheeled walker with seat and brakes and basket   Quantity:  1 Device   Refills:  0       * order for DME   This may have changed:  Another medication with the same name was added. Make sure you understand how and when to take each.   Used for:  Edema, unspecified type   Changed by:  Ailyn Nieves APRN CNP        Equipment being ordered: two pairs moderate knee high support hose   Quantity:  2 Device   Refills:  1       * order for DME   This may have changed:  You were already taking a medication with the same name, and this prescription was added. Make sure you understand how and when to take each.   Used for:  Localized edema   Changed by:  Ailyn Nieves APRN CNP        Equipment being ordered: Compression socks.   Quantity:  2 each   Refills:  0       * Notice:  This list has 3 medication(s) that are the same as other medications prescribed for you. Read the directions carefully, and ask your doctor or other care provider to review them with you.         Where to get your medicines      These medications were sent to [facility] [place], [place] - [address]  [address] [address]     Phone:  [phone]     tiotropium 18 MCG capsule         These medications were sent to [facility] [place], [place] - [address] [facility]  [address] [facility] [address]     Phone:  [phone]     atorvastatin 40 MG tablet         Some of these will need a paper prescription and others can be bought over the counter.  Ask your nurse if you have questions.     Bring a paper prescription for each of these medications     order for DME                Primary Care Provider Office Phone # Fax #    Ailyn Nieves  APRN [facility] [phone]       [address]  [facility] [address]        Equal Access to Services     CECIL TOLLIVER : Hadgaurav alysha redd prerna [place], jacobda elie, uzielta katannada stephanie, kassandra chaudharideborah sahara. So [facility] [phone].    ATENCIÓN: Si habla español, tiene a bailey disposición servicios gratuitos de asistencia lingüística. Llame al [phone].    We comply with applicable federal civil rights laws and [place] laws. We do not discriminate on the basis of race, color, national origin, age, disability, sex, sexual orientation, or gender identity.            Thank you!     Thank you for choosing [facility]  for your care. Our goal is always to provide you with excellent care. Hearing back from our patients is one way we can continue to improve our services. Please take a few minutes to complete the written survey that you may receive in the mail after your visit with us. Thank you!             Your Updated Medication List - Protect others around you: Learn how to safely use, store and throw away your medicines at www.disposemymeds.org.          This list is accurate as of 1/15/18 11:59 PM.  Always use your most recent med list.                   Brand Name Dispense Instructions for use Diagnosis    albuterol 108 (90 BASE) MCG/ACT Inhaler    PROAIR HFA/PROVENTIL HFA/VENTOLIN HFA    18 g    Inhale 2 puffs into the lungs every 6 hours as needed for shortness of breath / dyspnea or wheezing    Chronic obstructive pulmonary disease, unspecified COPD type (H)       amLODIPine 5 MG tablet    NORVASC    60 tablet    Take 2 tablets (10 mg) by mouth daily    Renovascular hypertension       ASPIRIN LOW DOSE 81 MG EC tablet   Generic drug:  aspirin     30 tablet    Take 1 tablet (81 mg) by mouth daily    History of MI (myocardial infarction), Essential hypertension, benign       atorvastatin 40 MG tablet    LIPITOR    90 tablet    Take 1 tablet  (40 mg) by mouth daily    Hyperlipidemia LDL goal <100       blood glucose monitoring lancets     1 Box    Test BS four times daily as directed    Type 2 diabetes mellitus without complication, with long-term current use of insulin (H)       blood glucose monitoring test strip    FREESTYLE LITE    50 strip    TEST BLOOD SUGAR TWO TIMES A DAY    Type 2 diabetes mellitus without complication, with long-term current use of insulin (H)       docusate sodium 100 MG capsule    COLACE    60 capsule    Take 1 capsule (100 mg) by mouth 2 times daily    Constipation, unspecified constipation type       EUCERIN CALMING DAILY MOIST Crea     1 Tube    Externally apply 1 dose * topically daily    Type II or unspecified type diabetes mellitus with neurological manifestations, not stated as uncontrolled(250.60) (H)       ferrous sulfate 325 (65 FE) MG tablet    IRON    100 tablet    Take 1 tablet (325 mg) by mouth daily (with breakfast)    Anemia, iron deficiency       furosemide 40 MG tablet    LASIX    60 tablet    Take 1 tablet (40 mg) by mouth 2 times daily    Hyperkalemia, Edema, unspecified type       glucose-vitamin C 4-0.006 G Chew    DEX4 GLUCOSE    50 tablet    Take 1 tablet (4 g) by mouth every hour as needed for low blood sugar    Controlled type 2 diabetes mellitus with stage 4 chronic kidney disease, with long-term current use of insulin (H)       hydrALAZINE 25 MG tablet    APRESOLINE    60 tablet    Take 1 tablet (25 mg) by mouth 2 times daily    HTN (hypertension), Chronic kidney disease, stage 4 (severe) (H)       insulin glargine 100 UNIT/ML injection    LANTUS    15 mL    Inject 10 Units Subcutaneous every morning    Controlled type 2 diabetes mellitus with stage 4 chronic kidney disease, with long-term current use of insulin (H)       insulin pen needle 31G X 6 MM     120 each    Use as directed    Type 2 diabetes mellitus without complication (H)       levothyroxine 200 MCG tablet    SYNTHROID/LEVOTHROID    90  tablet    Take 1 tablet (200 mcg) by mouth See Admin Instructions New daily increase. Recheck labs in 8 weeks.    Other specified hypothyroidism       metoprolol succinate 25 MG 24 hr tablet    TOPROL-XL    90 tablet    Take 1 tablet (25 mg) by mouth daily    Hypertension associated with diabetes (H)       mometasone-formoterol 100-5 MCG/ACT oral inhaler    DULERA    1 Inhaler    Inhale 2 puffs into the lungs 2 times daily    COPD (chronic obstructive pulmonary disease) (H)       nitroGLYcerin 0.4 MG sublingual tablet    NITROSTAT    25 tablet    Place 1 tablet (0.4 mg) under the tongue every 5 minutes as needed for chest pain    History of MI (myocardial infarction)       nystatin 874168 UNIT/GM Powd    MYCOSTATIN    30 g    Apply 1 g topically 3 times daily as needed    Groin rash       * order for DME     1 Device    One wheeled walker with seat and brakes and basket    Risk for falls       * order for DME     2 Device    Equipment being ordered: two pairs moderate knee high support hose    Edema, unspecified type       * order for DME     2 each    Equipment being ordered: Compression socks.    Localized edema       oxyCODONE IR 10 MG tablet    ROXICODONE    90 tablet    Take 1 tablet (10 mg) by mouth every 8 hours as needed    Chronic low back pain without sciatica, unspecified back pain laterality       polyethylene glycol powder    MIRALAX    510 g    Take 17 g (1 capful) by mouth daily    Slow transit constipation       SM ALCOHOL PREP 70 % Pads     100 each    Externally apply 1 pad topically 4 times daily    Type 2 diabetes mellitus without complication, with long-term current use of insulin (H)       sodium bicarbonate 325 MG tablet     180 tablet    Take 2 tablets (650 mg) by mouth 3 times daily    Acidosis, CKD (chronic kidney disease) stage 4, GFR 15-29 ml/min (H)       tiotropium 18 MCG capsule    SPIRIVA HANDIHALER    90 capsule    Inhale contents of one capsule daily.    Pulmonary emphysema,  unspecified emphysema type (H)       vitamin D 2000 UNITS tablet     60 tablet    Take 2,000 Units by mouth daily    Vitamin D deficiency disease       * Notice:  This list has 3 medication(s) that are the same as other medications prescribed for you. Read the directions carefully, and ask your doctor or other care provider to review them with you.

## 2018-01-15 NOTE — PROGRESS NOTES
SUBJECTIVE:                                                    Kim Anne is a 72 year old  female who presents to clinic today for the following health issues:  [facility]: [facility]    Diabetes Follow-up  Patient states that she checks her blood sugars only when they are high. Patient states that she checked it last week and it was at 99.    Patient is checking blood sugars: Pt reports when she feels good she does not check sugar levels.     Diabetic concerns: None     Symptoms of hypoglycemia (low blood sugar): none     Paresthesias (numbness or burning in feet) or sores: Burning at night once in a while      Date of last diabetic eye exam: Due     Hyperlipidemia Follow-Up    Rate your low fat/cholesterol diet?: Pt reports concerned about what to eat and what not to eat consult on potassium enriched food.      Taking statin?  Yes, no muscle aches from statin    Other lipid medications/supplements?:  none    Hypertension Follow-up  Patient denies any chest pain, shortness of breath, or syncopal episodes.    Outpatient blood pressures are not being checked.    Low Salt Diet: no added salt    BP Readings from Last 2 Encounters:   01/08/18 128/60   12/22/17 156/70     Hemoglobin A1C (%)   Date Value   10/25/2017 6.6 (H)   09/07/2017 6.4 (H)     LDL Cholesterol Calculated (mg/dL)   Date Value   10/25/2017 104 (H)   09/07/2017 81     Back Pain Follow Up    Description:   Location of pain:  Low back pain   Character of pain: constant  Pain radiation: Patient reports pain radiates down to left leg  Since last visit, pain is:  worsened  New numbness or weakness in legs, not attributed to pain:  YES-  Pt reports weakness in legs due to pain she is unable to stand for long periods. She reports even standing in grocery line has become too much.     Intensity: Currently 10/10    History:   Pain interferes with job: Not applicable  Therapies tried without relief: Hot Tub   Therapies tried with relief: Hot  tub and oxycodone        Accompanying Signs & Symptoms:  Risk of Fracture:  Age >64  Risk of Cauda Equina:  None  Risk of Infection:  None  Risk of Cancer:  None        Amount of exercise or physical activity: walks outside when weather is nice.     Problems taking medications regularly: no, patient would like to discontinue some of her medications. Discussed the importance of her taking the medications currently prescribed.     Medication side effects: none  Diet: Regular diet, patient working on eating a low potassium diet.       Mental Health Follow up   Patient states that she is concerned about her diet with her high potassium levels. Patient states that her son's mood is getting better, so this has helped her mood too. Patient states that she will think about getting PCA help in the home to help with ADL's. Patient states that her son's health is not getting better but his mood has improved.     Status since last visit: improving.     See PHQ-9 for current symptoms.  Other associated symptoms: None    Complicating factors: none.   Significant life event:  No   Current substance abuse:  None  Anxiety or Panic symptoms:  No    Sleep - good, waking up about 2 times per night.   Appetite - watching her potassium diet.   Exercise - walking, otherwise exercising at home.     Smoking - 5-6 cigarettes per day.   Alcohol - no  Street drugs - no  Marijuana - last about 5 years ago.     PHQ-9  English PHQ-9   Any Language               Problems taking medications regularly: Yes, but only taking her insulin when she believes she needs to.     Medication side effects: none  Social History   Substance Use Topics     Smoking status: Current Every Day Smoker     Packs/day: 0.80     Years: 40.00     Types: Cigarettes     Last attempt to quit: 10/31/2016     Smokeless tobacco: Never Used     Alcohol use No        Problem list and histories reviewed & adjusted, as indicated.  Additional history: as documented    Patient Active  Problem List   Diagnosis     Hyperlipidemia LDL goal <100     ARAUZ (dyspnea on exertion)     COPD (chronic obstructive pulmonary disease) (H)     Edema     Fatigue     History of MI (myocardial infarction)     Snores     Knee pain, left     Bunion of great toe of left foot     Dystrophic nail     Arthritis     Peripheral vascular disease (H)     Chronic low back pain     Health Care Home     CKD (chronic kidney disease) stage 4, GFR 15-29 ml/min (H)     Abnormal gait     Advance Care Planning     Vitamin D deficiency     Constipation     Hypertension goal BP (blood pressure) < 130/80     Bradycardia     Callus of foot     Other chronic pain     Cataracts, bilateral     Hyperopic astigmatism of both eyes     Presbyopia     Asymptomatic bunion, right     Acquired hypothyroidism     Type 2 diabetes mellitus with diabetic chronic kidney disease (H)     Hypertension associated with diabetes (H)     Hyperlipidemia associated with type 2 diabetes mellitus (H)     Obesity (BMI 30-39.9)     History of sick sinus syndrome     Nephrotic syndrome     Pneumonia     Grief     Cigarette nicotine dependence without complication     HCOM with LVOT     Hyperkalemia     Type 2 diabetes, HbA1C goal < 8% (H)     Smoker     Single kidney     Hypothyroid     Hypertension goal BP (blood pressure) < 140/90     Hyperlipidemia     Diabetic nephropathy (H)     Hypoalbuminemia     Past Surgical History:   Procedure Laterality Date     APPENDECTOMY       BLEPHAROPLASTY BILATERAL  9/18/2013    Procedure: BLEPHAROPLASTY BILATERAL;  BILATERAL UPPER EYELID BLEPHAROPLASTY ;  Surgeon: Olayinka Lyon MD;  Location: [facility]     CATARACT IOL, RT/LT Bilateral 2016     CHOLECYSTECTOMY       COLONOSCOPY  7/15/2011    polyps repeat in 5 years     elected term pregnancy       HYSTEROSCOPIC PLACEMENT CONTRACEPTIVE DEVICE       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 No family hx of      Hypertension No family hx of      CEREBROVASCULAR DISEASE No family hx of      Breast Cancer No family hx of      Cancer - colorectal No family hx of      Prostate Cancer No family hx of      Allergies No family hx of      Alzheimer Disease No family hx of      Anesthesia Reaction No family hx of      Arthritis No family hx of      Blood Disease No family hx of      CANCER No family hx of      Cardiovascular No family hx of      Circulatory No family hx of      Congenital Anomalies No family hx of      Connective Tissue Disorder No family hx of      Depression No family hx of      Eye Disorder No family hx of      Genetic Disorder No family hx of      GASTROINTESTINAL DISEASE No family hx of      Genitourinary Problems No family hx of      Gynecology No family hx of      HEART DISEASE No family hx of      Lipids No family hx of      Musculoskeletal Disorder No family hx of      Neurologic Disorder No family hx of      Obesity No family hx of      OSTEOPOROSIS No family hx of      Psychotic Disorder No family hx of      Respiratory No family hx of      Thyroid Disease No family hx of      Glaucoma No family hx of      Macular Degeneration No family hx of            Current Outpatient Prescriptions   Medication Sig Dispense Refill     patiromer (VELTASSA) 8.4 G packet Take 8.4 g by mouth daily 30 each 11     hydrALAZINE (APRESOLINE) 25 MG tablet Take 1 tablet (25 mg) by mouth 2 times daily 60 tablet 1     oxyCODONE IR (ROXICODONE) 10 MG  tablet Take 1 tablet (10 mg) by mouth every 8 hours as needed 90 tablet 0     furosemide (LASIX) 40 MG tablet Take 1 tablet (40 mg) by mouth 2 times daily 60 tablet 3     tiotropium (SPIRIVA HANDIHALER) 18 MCG capsule Inhale contents of one capsule daily. 90 capsule 3     sodium bicarbonate 325 MG tablet Take 2 tablets (650 mg) by mouth 3 times daily 180 tablet 3     nitroGLYcerin (NITROSTAT) 0.4 MG sublingual tablet Place 1 tablet (0.4 mg) under the tongue every 5 minutes as needed for chest pain 25 tablet 0     docusate sodium (COLACE) 100 MG capsule Take 1 capsule (100 mg) by mouth 2 times daily 60 capsule 4     amLODIPine (NORVASC) 5 MG tablet Take 2 tablets (10 mg) by mouth daily 60 tablet 3     ferrous sulfate (IRON) 325 (65 FE) MG tablet Take 1 tablet (325 mg) by mouth daily (with breakfast) 100 tablet 1     mometasone-formoterol (DULERA) 100-5 MCG/ACT oral inhaler Inhale 2 puffs into the lungs 2 times daily 1 Inhaler 1     albuterol (PROAIR HFA/PROVENTIL HFA/VENTOLIN HFA) 108 (90 BASE) MCG/ACT Inhaler Inhale 2 puffs into the lungs every 6 hours as needed for shortness of breath / dyspnea or wheezing 18 g 3     insulin glargine (LANTUS) 100 UNIT/ML injection Inject 10 Units Subcutaneous every morning 15 mL 3     hydrocortisone 1 % ointment Apply sparingly to affected area three times daily for 14 days. 30 g 0     order for DME Equipment being ordered: two pairs moderate knee high support hose 2 Device 1     atorvastatin (LIPITOR) 40 MG tablet Take 1 tablet (40 mg) by mouth daily 90 tablet 1     blood glucose monitoring (FREESTYLE) lancets Test BS four times daily as directed 1 Box 12     blood glucose monitoring (FREESTYLE LITE) test strip TEST BLOOD SUGAR TWO TIMES A DAY 50 strip 12     levothyroxine (SYNTHROID/LEVOTHROID) 200 MCG tablet Take 1 tablet (200 mcg) by mouth See Admin Instructions New daily increase. Recheck labs in 8 weeks. 90 tablet 1     metoprolol (TOPROL-XL) 25 MG 24 hr tablet Take 1 tablet  (25 mg) by mouth daily 90 tablet 1     nystatin (MYCOSTATIN) 413758 UNIT/GM POWD Apply 1 g topically 3 times daily as needed 30 g 1     polyethylene glycol (MIRALAX) powder Take 17 g (1 capful) by mouth daily 510 g 2     Alcohol Swabs (SM ALCOHOL PREP) 70 % PADS Externally apply 1 pad topically 4 times daily 100 each 11     order for DME One wheeled walker with seat and brakes and basket 1 Device 0     glucose-vitamin C (DEX4 GLUCOSE) 4-0.006 G CHEW Take 1 tablet (4 g) by mouth every hour as needed for low blood sugar 50 tablet 3     Cholecalciferol (VITAMIN D) 2000 UNITS tablet Take 2,000 Units by mouth daily 60 tablet 2     insulin pen needle 31G X 6 MM Use as directed 120 each 3     ASPIRIN LOW DOSE 81 MG EC tablet Take 1 tablet (81 mg) by mouth daily 30 tablet 11     Emollient (EUCERIN CALMING DAILY MOIST) CREA Externally apply 1 dose * topically daily 1 Tube 12     Allergies   Allergen Reactions     Contrast Dye Hives and Itching     Clonidine      She had as IP and thinks it made her itchy     Diatrizoate Other (See Comments)     Diltiazem      Severe bradycardia     Hydralazine      [place] tab patient thought made her itchy so stopped     Iodine-131      Recent Labs   Lab Test  01/08/18   1335  12/22/17   1040   10/25/17   0639  10/20/17   1626   09/07/17   1135  05/10/17   1025   11/02/16   0622   A1C   --    --    --   6.6*   --    --   6.4*  6.6*   < >   --    LDL   --    --    --   104*   --    --   81  96   --    --    HDL   --    --    --   67   --    --   75  66   --    --    TRIG   --    --    --   212*   --    --   283*  303*   --    --    ALT   --    --    --   16   --    --   18   --    --   21   CR  3.08*  3.17*   < >  2.77*   --    < >  2.70*  2.25*   < >  2.64*   GFRESTIMATED  15*  14*   < >  17*   --    < >  17*  21*   < >  18*   GFRESTBLACK  18*  17*   < >  20*   --    < >  21*  26*   < >  22*   POTASSIUM  5.4*  5.7*   < >  4.3   --    < >  6.2*  5.5*   < >  5.6*   TSH   --    --    --    --    1.04   --   71.77*   --    < >   --     < > = values in this interval not displayed.      BP Readings from Last 3 Encounters:   01/08/18 128/60   12/22/17 156/70   12/15/17 186/77    Wt Readings from Last 3 Encounters:   12/15/17 176 lb 8 oz (80.1 kg)   10/25/17 177 lb 9.6 oz (80.6 kg)   10/20/17 177 lb (80.3 kg)        Labs reviewed in EPIC  Problem list, Medication list, Allergies, and Medical/Social/Surgical histories reviewed in [facility] and updated as appropriate.     ROS: Constitutional, neuro, ENT, endocrine, pulmonary, cardiac, gastrointestinal, genitourinary, musculoskeletal, integument and psychiatric systems are negative, except as otherwise noted above in the HPI.   OBJECTIVE:                                                    /56  Pulse 78  Temp 99.7  F (37.6  C) (Oral)  Resp 16  Wt 175 lb (79.4 kg)  SpO2 95%  BMI 29.12 kg/m2  Body mass index is 29.12 kg/(m^2).  GENERAL: healthy, alert, well nourished, well hydrated, no distress  EYES: Eyes grossly normal to inspection, extraocular movements - intact, and PERRL  HENT: ear canals- normal; TMs- normal; Nose- normal; Mouth- no ulcers, no lesions  NECK: no tenderness, no adenopathy, no asymmetry, no masses, no stiffness; thyroid- normal to palpation  RESP: lungs clear to auscultation - no rales, right lower rhonchi, no wheezes  CV: regular rates and rhythm, normal S1 S2, no S3 or S4 and no murmur, no click or rub - no homans or cords  ABDOMEN: soft, no tenderness,normal bowel sounds  MS: extremities- no gross deformities noted, no edema  SKIN: no suspicious lesions, no rashes  NEURO: strength and tone- normal, sensory exam- grossly normal, mentation- intact, speech- normal, reflexes- symmetric  Non focal no aphasia. No facial asymmetry.   BACK:  No CVA tenderness, paralumbar tenderness  LYMPHATICS: ant. cervical- normal, post. cervical- normal,  supraclavicular- normal  Mental Status Assessment:  Appearance:   Appropriate   Eye Contact:   Good    Psychomotor Behavior: Normal   Attitude:   Cooperative   Orientation:   All  Speech   Rate / Production: Normal    Volume:  Normal   Mood:    Normal  Affect:    Appropriate   Thought Content:  Clear   Thought Form:  Coherent  Logical   Insight:    Fair   Attention Span and Concentration:  appropriate  Recent and Remote Memory:  intact  Fund of Knowledge: appropriate  Muscle Strength and Tone: normal     See [facility] notes     Diagnostic Test Results:  Pending.      ASSESSMENT/PLAN:                                                    (E11.22,  N18.4,  Z79.4) Type 2 diabetes mellitus with stage 4 chronic kidney disease, with long-term current use of insulin (H)  (primary encounter diagnosis)  Comment: as noted above.   Plan: Hemoglobin A1c        -Encouraged patient to take her insulin daily and check her blood sugars daily.     (J43.9) Pulmonary emphysema, unspecified emphysema type (H)  Comment: Patient states that she has been using her albuterol inhaler frequently. Reports that she has only been using her Dulera once per day.   Plan: tiotropium (SPIRIVA HANDIHALER) 18 MCG capsule        -Discussed with patient that she should be taking her Dulera twice daily to help reduce the frequency use of the albuterol.     (R60.0) Localized edema  Comment: Patient with bilateral 2+ foot ankle edema. Patient has compression sock on today but they seem to be loose on her.   Plan: order for DME        Discussed ordering of new compression socks to help with the edema.   -Clarified with patient her lasix dose of 40 mg daily.     (E78.5) Hyperlipidemia LDL goal <100  Comment: Last cholesterol remained elevated.   Plan: atorvastatin (LIPITOR) 40 MG tablet        Will discuss increasing the Lipitor to 80 mg daily at the next visit.     (E03.9) Hypothyroidism, unspecified type  Comment: Rechecking level.   Plan: **TSH with free T4 reflex FUTURE 1yr          (E87.5) Hyperkalemia  Comment: Last EKG 10/25/2017, concern for changes with  hyperkalemia.   Plan: EKG 12-lead complete w/read - Clinics            Patient Instructions     -Use Dulera 2 puffs twice daily.   -Refill done.   -Call clinic with any questions or concerns.     Discharge Instructions: Eating a Low-Potassium Diet  Your healthcare provider has prescribed a low-potassium diet for you. This kind of diet is advised for people who have certain kidney problems. Potassium is needed for muscle function. But too much potassium is a health risk. Potassium is found in many foods. Read below to find out how to change your diet.  Foods to limit  Some foods are high in potassium. Limit your daily intake of the foods in the list below.    Fruits: apricots (canned and fresh), bananas, cantaloupe, honeydew melon, kiwi, nectarines, pomegranates, oranges, orange juice, pears, dried fruits (apricots, dates, figs, prunes), and prune juice    Vegetables: asparagus, avocado, artichoke, bamboo shoots, beets, brussels sprouts, cabbage, celery, chard, okra, potatoes (white and sweet), pumpkin, rutabaga, spinach (cooked), squash, tomato, tomato sauce, tomato juice, and vegetable juice cocktail    Legumes: black-eyed peas, chickpeas, lentils, lima beans, navy beans, red kidney beans, soybeans, and split peas    Nuts and seeds: almonds, Brazil nuts, cashews, peanuts, peanut butter, pecans, pumpkin seeds, sunflower seeds, and walnuts    Breads and cereals: bran and whole-grain products    Dairy foods: milk, cheese, ice cream, yogurt    Animal protein: all forms of animal protein    Other: chocolate, cocoa, coconut milk, and molasses  Tips    Ask your healthcare provider how much potassium you are allowed each day. This will help you figure out serving sizes for your needs.    Check labels for potassium. It may be listed as potassium chloride.    Do not use salt substitutes. These often have potassium in them.    Cook frozen fruits and vegetables in water. Rinse and drain them well before eating.    Drain liquid  from all canned fruits and vegetables. Rinse them before eating.    Reduce the potassium in potatoes. Peel them, slice thinly, and soak in water for at least 4 hours.    Reduce the potassium in green leafy vegetables. Soak them in water for at least 4 hours.    Eat white rice and refined white flour products. These include white bread, pasta, and grits.  Follow-up  Make a follow-up appointment as advised by your healthcare provider.  When to call your healthcare provider  Call your healthcare provider right away if you have any of the following:    Fatigue    Shortness of breath    Chest pain    Slow, irregular heartbeat    Fainting    Dizziness    Lightheadedness    Confusion   Date Last Reviewed: 6/1/2017 2000-2017 [employer]. [address] [place] [address], [place] [address]. All rights reserved. This information is not intended as a substitute for professional medical care. Always follow your healthcare professional's instructions.        I spent 25 min spent in direct face to face time with Kim Anne, greater than 50% in counseling and coordination of care for:  Diabetes, low potassium foods, and depression/anxiety.       [facility] PRIMARY CARE

## 2018-01-15 NOTE — PROGRESS NOTES
"[facility] - Integrated Primary Care   January 15, 2018      Behavioral Health Clinician Progress Note    Patient Name: Kim Anne           Service Type: Individual      Service Location:   Face to Face in Clinic     Session Start Time: 10:36am  Session End Time: 11:04am      Session Length: 16 - 37      Attendees: Patient and PCP    Visit Activities (Refresh list every visit): [facility] [place] [employer]    Diagnostic Assessment Date: TBD  Treatment Plan Review Date: TBD  See Flowsheets for today's PHQ-9 and JUSTINE-7 results  Previous PHQ-9:   PHQ-9 SCORE 12/2/2016 12/12/2016 2/14/2017   Total Score - - -   Total Score - - -   Total Score 0 0 1     Previous JUSTINE-7:   JUSTINE-7 SCORE 1/13/2016 12/2/2016 12/12/2016   Total Score - - -   Total Score 0 0 0   Total Score - - -       NAE LEVEL:  NAE Score (Last Two) 2/18/2013 5/23/2014   NAE Raw Score 48 45   Activation Score 80 73.1   NAE Level 4 4       DATA  Extended Session (60+ minutes): No  Interactive Complexity: No  Crisis: No    Treatment Objective(s) Addressed in This Session:  Target Behavior(s): disease management/lifestyle changes manage diabetes better    Grief / Loss: will process grief/loss issues in an adaptive manner  Psychological distress related to Diabetes    Current Stressors / Issues:  [facility] [place] met with patient at PCP request to assess current behavioral health needs and provide information and support as necessary.  Pt reported that she has been doing well. Pt reported that she does have a lot of anxiety regarding what she can eat and what she cannot due to having high potassium levels. Pt and writer processed how at her appointment tomorrow she should get a better idea of what she can eat. Pt reported that she gets up about twice a night to use the bathroom. Pt reported that her son and his son continues to live with her. Pt stated that her son's health is not getting better, however, \"his personality is getting better\". Pt stated that she would like some " help in her house in terms of helping with cleaning and cooking, but needs to think about if she wants that at this time. Pt reported that she smokes 5-6 cigarettes a day. Pt denied any alcohol or drug use. Pt reported that she does have sisters that she contacts on a regular basis. She stated that they are good support for her. Pt reported that her grandson's birthday is this week which she is excited for.    Reviewed new and ongoing stressors  Reviewed mental health symptoms and appropriate coping mechanisms    I affirmed the steps this patient has taken to address physical and behavioral health issues, and offered continued behavioral health services or referral, now or in the future, as needed by the patient.    Progress on Treatment Objective(s) / Homework:  Satisfactory progress - CONTEMPLATION (Considering change and yet undecided); Intervened by assessing the negative and positive thinking (ambivalence) about behavior change    Motivational Interviewing    MI Intervention: Expressed Empathy/Understanding, Supported Autonomy, Collaboration, Evocation, Open-ended questions and Reflections: simple and complex     Change Talk Expressed by the Patient: Committment to change Activation Taking steps    Provider Response to Change Talk: E - Evoked more info from patient about behavior change, A - Affirmed patient's thoughts, decisions, or attempts at behavior change, R - Reflected patient's change talk and S - Summarized patient's change talk statements      Care Plan review completed: No    Medication Review:  No changes to current psychiatric medication(s)    Medication Compliance:  Yes    Changes in Health Issues:   None reported    Chemical Use Review:   Substance Use: Chemical use reviewed, no active concerns identified      Tobacco Use: Yes, increase.  Client reports frequency of use 6 cigarettes daily. Reviewed information and resources for quitting  Reviewed options for assistance and intervention  Provided  encouragement to quit     Assessment: Current Emotional / Mental Status (status of significant symptoms):  Risk status (Self / Other harm or suicidal ideation)  Patient denies a history of suicidal ideation, suicide attempts, self-injurious behavior, homicidal ideation, homicidal behavior and and other safety concerns  Patient denies current fears or concerns for personal safety.  Patient denies current or recent suicidal ideation or behaviors.  Patient denies current or recent homicidal ideation or behaviors.  Patient denies current or recent self injurious behavior or ideation.  Patient denies other safety concerns.  A safety and risk management plan has not been developed at this time, however patient was encouraged to call [place] [phone] should there be a change in any of these risk factors.    Appearance:   Appropriate   Eye Contact:   Fair   Psychomotor Behavior: Normal   Attitude:   Cooperative   Orientation:   All  Speech   Rate / Production: Impoverished  Monotone    Volume:  Normal   Mood:    Normal  Affect:    Blunted  Flat   Thought Content:  Clear   Thought Form:  Logical   Insight:    Fair     Diagnoses:  1. Grief        Collateral Reports Completed:  Not Applicable    Plan: (Homework, other):  Patient was given information about behavioral services and encouraged to schedule a follow up appointment with the clinic [facility] as needed.  She was also given information about mental health symptoms and treatment options .  CD Recommendations: Maintain Sobriety.  LAMINE Ndiaye, [facility]      ______________________________________________________________________    Integrated Primary Care Behavioral Health Treatment Plan    Patient's Name: Kim Anne  YOB: 1945    Date: To be completed

## 2018-01-15 NOTE — PATIENT INSTRUCTIONS
-Use Dulera 2 puffs twice daily.   -Refill done.   -Call clinic with any questions or concerns.     Discharge Instructions: Eating a Low-Potassium Diet  Your healthcare provider has prescribed a low-potassium diet for you. This kind of diet is advised for people who have certain kidney problems. Potassium is needed for muscle function. But too much potassium is a health risk. Potassium is found in many foods. Read below to find out how to change your diet.  Foods to limit  Some foods are high in potassium. Limit your daily intake of the foods in the list below.    Fruits: apricots (canned and fresh), bananas, cantaloupe, honeydew melon, kiwi, nectarines, pomegranates, oranges, orange juice, pears, dried fruits (apricots, dates, figs, prunes), and prune juice    Vegetables: asparagus, avocado, artichoke, bamboo shoots, beets, brussels sprouts, cabbage, celery, chard, okra, potatoes (white and sweet), pumpkin, rutabaga, spinach (cooked), squash, tomato, tomato sauce, tomato juice, and vegetable juice cocktail    Legumes: black-eyed peas, chickpeas, lentils, lima beans, navy beans, red kidney beans, soybeans, and split peas    Nuts and seeds: almonds, Brazil nuts, cashews, peanuts, peanut butter, pecans, pumpkin seeds, sunflower seeds, and walnuts    Breads and cereals: bran and whole-grain products    Dairy foods: milk, cheese, ice cream, yogurt    Animal protein: all forms of animal protein    Other: chocolate, cocoa, coconut milk, and molasses  Tips    Ask your healthcare provider how much potassium you are allowed each day. This will help you figure out serving sizes for your needs.    Check labels for potassium. It may be listed as potassium chloride.    Do not use salt substitutes. These often have potassium in them.    Cook frozen fruits and vegetables in water. Rinse and drain them well before eating.    Drain liquid from all canned fruits and vegetables. Rinse them before eating.    Reduce the potassium in  potatoes. Peel them, slice thinly, and soak in water for at least 4 hours.    Reduce the potassium in green leafy vegetables. Soak them in water for at least 4 hours.    Eat white rice and refined white flour products. These include white bread, pasta, and grits.  Follow-up  Make a follow-up appointment as advised by your healthcare provider.  When to call your healthcare provider  Call your healthcare provider right away if you have any of the following:    Fatigue    Shortness of breath    Chest pain    Slow, irregular heartbeat    Fainting    Dizziness    Lightheadedness    Confusion   Date Last Reviewed: 6/1/2017 2000-2017 [employer]. [address] [place] [address], [place] [address]. All rights reserved. This information is not intended as a substitute for professional medical care. Always follow your healthcare professional's instructions.

## 2018-01-16 ENCOUNTER — OFFICE VISIT (OUTPATIENT)
Dept: PHARMACY | Facility: CLINIC | Age: 73
End: 2018-01-16
Payer: COMMERCIAL

## 2018-01-16 ENCOUNTER — TELEPHONE (OUTPATIENT)
Dept: FAMILY MEDICINE | Facility: CLINIC | Age: 73
End: 2018-01-16

## 2018-01-16 ENCOUNTER — CARE COORDINATION (OUTPATIENT)
Dept: GERIATRIC MEDICINE | Facility: CLINIC | Age: 73
End: 2018-01-16

## 2018-01-16 ENCOUNTER — RADIANT APPOINTMENT (OUTPATIENT)
Dept: ULTRASOUND IMAGING | Facility: CLINIC | Age: 73
End: 2018-01-16
Attending: CLINICAL NURSE SPECIALIST
Payer: COMMERCIAL

## 2018-01-16 ENCOUNTER — OFFICE VISIT (OUTPATIENT)
Dept: TRANSPLANT | Facility: CLINIC | Age: 73
End: 2018-01-16
Attending: SURGERY
Payer: COMMERCIAL

## 2018-01-16 ENCOUNTER — ALLIED HEALTH/NURSE VISIT (OUTPATIENT)
Dept: TRANSPLANT | Facility: CLINIC | Age: 73
End: 2018-01-16
Attending: PHYSICIAN ASSISTANT
Payer: COMMERCIAL

## 2018-01-16 VITALS — HEART RATE: 62 BPM | DIASTOLIC BLOOD PRESSURE: 74 MMHG | SYSTOLIC BLOOD PRESSURE: 144 MMHG

## 2018-01-16 DIAGNOSIS — K59.01 SLOW TRANSIT CONSTIPATION: ICD-10-CM

## 2018-01-16 DIAGNOSIS — R60.0 LOCALIZED EDEMA: ICD-10-CM

## 2018-01-16 DIAGNOSIS — Z71.6 ENCOUNTER FOR SMOKING CESSATION COUNSELING: ICD-10-CM

## 2018-01-16 DIAGNOSIS — N18.4 CHRONIC KIDNEY DISEASE, STAGE 4, SEVERELY DECREASED GFR (H): ICD-10-CM

## 2018-01-16 DIAGNOSIS — N18.5 TYPE 2 DIABETES MELLITUS WITH STAGE 5 CHRONIC KIDNEY DISEASE NOT ON CHRONIC DIALYSIS, WITH LONG-TERM CURRENT USE OF INSULIN (H): ICD-10-CM

## 2018-01-16 DIAGNOSIS — N18.4 CKD (CHRONIC KIDNEY DISEASE) STAGE 4, GFR 15-29 ML/MIN (H): Primary | ICD-10-CM

## 2018-01-16 DIAGNOSIS — E03.8 OTHER SPECIFIED HYPOTHYROIDISM: ICD-10-CM

## 2018-01-16 DIAGNOSIS — I15.1 HYPERTENSION SECONDARY TO OTHER RENAL DISORDERS: Primary | ICD-10-CM

## 2018-01-16 DIAGNOSIS — I10 ESSENTIAL HYPERTENSION: ICD-10-CM

## 2018-01-16 DIAGNOSIS — N18.4 CHRONIC KIDNEY DISEASE, STAGE 4, SEVERELY DECREASED GFR (H): Primary | ICD-10-CM

## 2018-01-16 DIAGNOSIS — N18.4 TYPE 2 DIABETES MELLITUS WITH STAGE 4 CHRONIC KIDNEY DISEASE, WITH LONG-TERM CURRENT USE OF INSULIN (H): ICD-10-CM

## 2018-01-16 DIAGNOSIS — E78.5 HYPERLIPIDEMIA LDL GOAL <100: ICD-10-CM

## 2018-01-16 DIAGNOSIS — E11.22 TYPE 2 DIABETES MELLITUS WITH STAGE 5 CHRONIC KIDNEY DISEASE NOT ON CHRONIC DIALYSIS, WITH LONG-TERM CURRENT USE OF INSULIN (H): ICD-10-CM

## 2018-01-16 DIAGNOSIS — Z79.4 TYPE 2 DIABETES MELLITUS WITH STAGE 5 CHRONIC KIDNEY DISEASE NOT ON CHRONIC DIALYSIS, WITH LONG-TERM CURRENT USE OF INSULIN (H): ICD-10-CM

## 2018-01-16 DIAGNOSIS — R52 PAIN: ICD-10-CM

## 2018-01-16 DIAGNOSIS — N18.4 CKD (CHRONIC KIDNEY DISEASE) STAGE 4, GFR 15-29 ML/MIN (H): ICD-10-CM

## 2018-01-16 DIAGNOSIS — N18.5 CHRONIC KIDNEY DISEASE, STAGE 5, KIDNEY FAILURE (H): Primary | ICD-10-CM

## 2018-01-16 DIAGNOSIS — J44.9 CHRONIC OBSTRUCTIVE PULMONARY DISEASE, UNSPECIFIED COPD TYPE (H): ICD-10-CM

## 2018-01-16 DIAGNOSIS — Z90.5 SINGLE KIDNEY: ICD-10-CM

## 2018-01-16 DIAGNOSIS — Z45.2 ADJUSTMENT AND MANAGEMENT OF VASCULAR ACCESS DEVICE: ICD-10-CM

## 2018-01-16 DIAGNOSIS — F17.200 SMOKER: ICD-10-CM

## 2018-01-16 DIAGNOSIS — E87.5 HYPERKALEMIA: ICD-10-CM

## 2018-01-16 DIAGNOSIS — Z79.4 TYPE 2 DIABETES MELLITUS WITH STAGE 4 CHRONIC KIDNEY DISEASE, WITH LONG-TERM CURRENT USE OF INSULIN (H): ICD-10-CM

## 2018-01-16 DIAGNOSIS — I73.9 PVD (PERIPHERAL VASCULAR DISEASE) (H): ICD-10-CM

## 2018-01-16 DIAGNOSIS — E11.22 TYPE 2 DIABETES MELLITUS WITH STAGE 4 CHRONIC KIDNEY DISEASE, WITH LONG-TERM CURRENT USE OF INSULIN (H): ICD-10-CM

## 2018-01-16 LAB
ANION GAP SERPL CALCULATED.3IONS-SCNC: 10 MMOL/L (ref 3–14)
BUN SERPL-MCNC: 41 MG/DL (ref 7–30)
CALCIUM SERPL-MCNC: 7.8 MG/DL (ref 8.5–10.1)
CHLORIDE SERPL-SCNC: 114 MMOL/L (ref 94–109)
CO2 SERPL-SCNC: 21 MMOL/L (ref 20–32)
CREAT SERPL-MCNC: 3.34 MG/DL (ref 0.52–1.04)
GFR SERPL CREATININE-BSD FRML MDRD: 14 ML/MIN/1.7M2
GLUCOSE SERPL-MCNC: 95 MG/DL (ref 70–99)
HBA1C MFR BLD: 6 % (ref 4.3–6)
POTASSIUM SERPL-SCNC: 4.8 MMOL/L (ref 3.4–5.3)
SODIUM SERPL-SCNC: 144 MMOL/L (ref 133–144)

## 2018-01-16 PROCEDURE — 99607 MTMS BY PHARM ADDL 15 MIN: CPT | Performed by: PHARMACIST

## 2018-01-16 PROCEDURE — 99606 MTMS BY PHARM EST 15 MIN: CPT | Performed by: PHARMACIST

## 2018-01-16 PROCEDURE — 80048 BASIC METABOLIC PNL TOTAL CA: CPT | Performed by: NURSE PRACTITIONER

## 2018-01-16 PROCEDURE — 83036 HEMOGLOBIN GLYCOSYLATED A1C: CPT | Performed by: NURSE PRACTITIONER

## 2018-01-16 PROCEDURE — 36415 COLL VENOUS BLD VENIPUNCTURE: CPT | Performed by: NURSE PRACTITIONER

## 2018-01-16 NOTE — MR AVS SNAPSHOT
After Visit Summary   1/16/2018    Kim Anne    MRN: [id number]           Patient Information     Date Of Birth          1945        Visit Information        Provider Department      1/16/2018 12:30 PM Sasha Adams RD [facility] Solid Organ Transplant        Today's Diagnoses     Chronic kidney disease, stage 4, severely decreased GFR (H)    -  1       Follow-ups after your visit        Your next 10 appointments already scheduled     Jan 29, 2018  1:00 PM CST   (Arrive by 12:45 PM)   NEW HEART FAILURE with Wyatt Schneider MD   [facility] Heart Care ([facility])    [address]  [address]  [place] [address]   [phone]            Feb 12, 2018 11:30 AM CST   Return Visit with LAMINE Chahal   [facility] ([facility])    [address]  [address]  [place] [address]   [phone]            Feb 12, 2018 11:30 AM CST   Return Visit with JUNIOR Bishop [facility] Primary [facility] ([facility])    [address]  [address]  [place] [facility] [address]   [phone]            Feb 16, 2018 10:00 AM CST   Lab with  LAB   [facility] Lab ([facility])    [address]  1st Floor  [place] [address] [phone]            Feb 16, 2018 11:00 AM CST   (Arrive by 10:30 AM)   Return Visit with Wendy Espinal PA-C   [facility] Nephrology ([facility])    [address]  [address]  [place] [address] [phone]            Feb 16, 2018 11:30 AM CST   (Arrive by 11:15 AM)   SHORT with Denton Yuen RPH   [facility] Medication Therapy Management ([facility])    [address]  3rd Floor  [place] [address] [phone]            Mar 29, 2018  7:30 AM CDT   PFT VISIT with  PFL TAMMI   [facility]  "Pulmonary Function Testing ([facility])    [address]  3rd [address]  [facility] [id number] [phone]            Mar 29, 2018  8:00 AM CDT   (Arrive by 7:45 AM)   Return Visit with Margret Packer MD   [facility] ([facility])    [address]  [address]  [facility] [id number] [phone]              Future tests that were ordered for you today     Open Future Orders        Priority Expected Expires Ordered    **TSH with free T4 reflex FUTURE 1yr Routine 12/16/2018 1/15/2019 1/15/2018            Who to contact     If you have questions or need follow up information about today's clinic visit or your schedule please contact [facility] directly at [phone].  Normal or non-critical lab and imaging results will be communicated to you by MyChart, letter or phone within 4 business days after the clinic has received the results. If you do not hear from us within 7 days, please contact the clinic through [employer] or phone. If you have a critical or abnormal lab result, we will notify you by phone as soon as possible.  Submit refill requests through [employer] or call your pharmacy and they will forward the refill request to us. Please allow 3 business days for your refill to be completed.          Additional Information About Your Visit        MyChart Information     [employer] lets you send messages to your doctor, view your test results, renew your prescriptions, schedule appointments and more. To sign up, go to [email or web address] . Click on \"Log in\" on the left side of the screen, which will take you to the Welcome page. Then click on \"Sign up Now\" on the right side of the page.     You will be asked to enter the access code listed below, as well as some personal information. Please follow the directions to create your username and password.     Your access code is: " [email or web address]  Expires: 3/1/2018  6:30 AM     Your access code will  in 90 days. If you need help or a new code, please call your [place] clinic or [phone].        Care EveryWhere ID     This is your Care EveryWhere ID. This could be used by other organizations to access your [place] medical records  [id number]         Blood Pressure from Last 3 Encounters:   18 144/74   01/15/18 130/56   18 128/60    Weight from Last 3 Encounters:   01/15/18 79.4 kg (175 lb)   12/15/17 80.1 kg (176 lb 8 oz)   10/25/17 80.6 kg (177 lb 9.6 oz)              Today, you had the following     No orders found for display         Today's Medication Changes          These changes are accurate as of: 18 12:53 PM.  If you have any questions, ask your nurse or doctor.               These medicines have changed or have updated prescriptions.        Dose/Directions    furosemide 40 MG tablet   Commonly known as:  LASIX   This may have changed:    - how much to take  - when to take this   Used for:  Hyperkalemia, Edema, unspecified type        Dose:  40 mg   Take 1 tablet (40 mg) by mouth 2 times daily   Quantity:  60 tablet   Refills:  3       hydrALAZINE 25 MG tablet   Commonly known as:  APRESOLINE   This may have changed:    - how much to take  - when to take this   Used for:  HTN (hypertension), Chronic kidney disease, stage 4 (severe) (H)        Dose:  25 mg   Take 1 tablet (25 mg) by mouth 2 times daily   Quantity:  60 tablet   Refills:  1       * order for DME   This may have changed:  Another medication with the same name was changed. Make sure you understand how and when to take each.   Used for:  Risk for falls   Changed by:  Mai Babcock MD        One wheeled walker with seat and brakes and basket   Quantity:  1 Device   Refills:  0       * order for DME   This may have changed:  Another medication with the same name was changed. Make sure you understand how and when to take each.   Used for:   Edema, unspecified type   Changed by:  Nazario Mcneal PA-C        Equipment being ordered: two pairs moderate knee high support hose   Quantity:  2 Device   Refills:  1       * order for DME   This may have changed:  additional instructions   Used for:  Localized edema   Changed by:  Ailyn Nieves APRN CNP        Equipment being ordered: Compression socks. Strength:15-20 mmHg   Quantity:  2 each   Refills:  0       * Notice:  This list has 3 medication(s) that are the same as other medications prescribed for you. Read the directions carefully, and ask your doctor or other care provider to review them with you.      Stop taking these medicines if you haven't already. Please contact your care team if you have questions.     glucose-vitamin C 4-0.006 G Chew   Commonly known as:  DEX4 GLUCOSE   Stopped by:  Denton Yuen, [facility]                Where to get your medicines      Some of these will need a paper prescription and others can be bought over the counter.  Ask your nurse if you have questions.     Bring a paper prescription for each of these medications     order for DME                Primary Care Provider Office Phone # Fax #    JUNIOR Bishop [phone]       [address] [facility] [address]  [facility] [address]        Equal Access to Services     CECIL TOLLIVER AH: Hadii alysha redd hadasho Sofranciscaali, waaxda luqadaha, qaybta kaalmada adeegyada, kassandra shoemaker. So [facility] [phone].    ATENCIÓN: Si habla español, tiene a bailey disposición servicios gratuitos de asistencia lingüística. Khadijah al [phone].    We comply with applicable federal civil rights laws and [place] laws. We do not discriminate on the basis of race, color, national origin, age, disability, sex, sexual orientation, or gender identity.            Thank you!     Thank you for choosing [facility]  for your care. Our goal is always to provide you with excellent care.  Hearing back from our patients is one way we can continue to improve our services. Please take a few minutes to complete the written survey that you may receive in the mail after your visit with us. Thank you!             Your Updated Medication List - Protect others around you: Learn how to safely use, store and throw away your medicines at www.disposemymeds.org.          This list is accurate as of: 1/16/18 12:53 PM.  Always use your most recent med list.                   Brand Name Dispense Instructions for use Diagnosis    albuterol 108 (90 BASE) MCG/ACT Inhaler    PROAIR HFA/PROVENTIL HFA/VENTOLIN HFA    18 g    Inhale 2 puffs into the lungs every 6 hours as needed for shortness of breath / dyspnea or wheezing    Chronic obstructive pulmonary disease, unspecified COPD type (H)       amLODIPine 5 MG tablet    NORVASC    60 tablet    Take 2 tablets (10 mg) by mouth daily    Renovascular hypertension       ASPIRIN LOW DOSE 81 MG EC tablet   Generic drug:  aspirin     30 tablet    Take 1 tablet (81 mg) by mouth daily    History of MI (myocardial infarction), Essential hypertension, benign       atorvastatin 40 MG tablet    LIPITOR    90 tablet    Take 1 tablet (40 mg) by mouth daily    Hyperlipidemia LDL goal <100       blood glucose monitoring lancets     1 Box    Test BS four times daily as directed    Type 2 diabetes mellitus without complication, with long-term current use of insulin (H)       blood glucose monitoring test strip    FREESTYLE LITE    50 strip    TEST BLOOD SUGAR TWO TIMES A DAY    Type 2 diabetes mellitus without complication, with long-term current use of insulin (H)       docusate sodium 100 MG capsule    COLACE    60 capsule    Take 1 capsule (100 mg) by mouth 2 times daily    Constipation, unspecified constipation type       EUCERIN CALMING DAILY MOIST Crea     1 Tube    Externally apply 1 dose * topically daily    Type II or unspecified type diabetes mellitus with neurological  manifestations, not stated as uncontrolled(250.60) (H)       ferrous sulfate 325 (65 FE) MG tablet    IRON    100 tablet    Take 1 tablet (325 mg) by mouth daily (with breakfast)    Anemia, iron deficiency       furosemide 40 MG tablet    LASIX    60 tablet    Take 1 tablet (40 mg) by mouth 2 times daily    Hyperkalemia, Edema, unspecified type       hydrALAZINE 25 MG tablet    APRESOLINE    60 tablet    Take 1 tablet (25 mg) by mouth 2 times daily    HTN (hypertension), Chronic kidney disease, stage 4 (severe) (H)       insulin glargine 100 UNIT/ML injection    LANTUS    15 mL    Inject 10 Units Subcutaneous every morning    Controlled type 2 diabetes mellitus with stage 4 chronic kidney disease, with long-term current use of insulin (H)       insulin pen needle 31G X 6 MM     120 each    Use as directed    Type 2 diabetes mellitus without complication (H)       levothyroxine 200 MCG tablet    SYNTHROID/LEVOTHROID    90 tablet    Take 1 tablet (200 mcg) by mouth See Admin Instructions New daily increase. Recheck labs in 8 weeks.    Other specified hypothyroidism       metoprolol succinate 25 MG 24 hr tablet    TOPROL-XL    90 tablet    Take 1 tablet (25 mg) by mouth daily    Hypertension associated with diabetes (H)       mometasone-formoterol 100-5 MCG/ACT oral inhaler    DULERA    1 Inhaler    Inhale 2 puffs into the lungs 2 times daily    COPD (chronic obstructive pulmonary disease) (H)       nitroGLYcerin 0.4 MG sublingual tablet    NITROSTAT    25 tablet    Place 1 tablet (0.4 mg) under the tongue every 5 minutes as needed for chest pain    History of MI (myocardial infarction)       nystatin 991490 UNIT/GM Powd    MYCOSTATIN    30 g    Apply 1 g topically 3 times daily as needed    Groin rash       * order for DME     1 Device    One wheeled walker with seat and brakes and basket    Risk for falls       * order for DME     2 Device    Equipment being ordered: two pairs moderate knee high support hose     Edema, unspecified type       * order for DME     2 each    Equipment being ordered: Compression socks. Strength:15-20 mmHg    Localized edema       oxyCODONE IR 10 MG tablet    ROXICODONE    90 tablet    Take 1 tablet (10 mg) by mouth every 8 hours as needed    Chronic low back pain without sciatica, unspecified back pain laterality       patiromer 8.4 G packet    VELTASSA    30 each    Take 8.4 g by mouth daily    Hyperkalemia       polyethylene glycol powder    MIRALAX    510 g    Take 17 g (1 capful) by mouth daily    Slow transit constipation       SM ALCOHOL PREP 70 % Pads     100 each    Externally apply 1 pad topically 4 times daily    Type 2 diabetes mellitus without complication, with long-term current use of insulin (H)       sodium bicarbonate 325 MG tablet     180 tablet    Take 2 tablets (650 mg) by mouth 3 times daily    Acidosis, CKD (chronic kidney disease) stage 4, GFR 15-29 ml/min (H)       tiotropium 18 MCG capsule    SPIRIVA HANDIHALER    90 capsule    Inhale contents of one capsule daily.    Pulmonary emphysema, unspecified emphysema type (H)       vitamin D 2000 UNITS tablet     60 tablet    Take 2,000 Units by mouth daily    Vitamin D deficiency disease       * Notice:  This list has 3 medication(s) that are the same as other medications prescribed for you. Read the directions carefully, and ask your doctor or other care provider to review them with you.

## 2018-01-16 NOTE — TELEPHONE ENCOUNTER
Incoming call from Ayush with [facility] stating that they received an order for pt's compression socks, but there is no strength specified.     They faxed back the order for revision.    Please follow up. Contact info: [phone]  Fax number: [phone]    Sonal Chavarria

## 2018-01-16 NOTE — PROGRESS NOTES
Outpatient MNT     Time Spent: 15 minutes  Visit Type: F/U (last seen by OP RD 10/2017 for kidney txp eval)  Referring Physician: Dr Espinal   Reason for RD Visit: hyperkalemia   Pt accompanied by: self    Follow Up on Previous Goals  1. Limit Na+ <2000 mg/day  Update/Evaluation: Goal likely met - see below for interval history     Previous Nutrition Dx  Food and nutrition related knowledge deficit r/t pre kidney transplant eval AEB pt verbalized not hearing pre/post transplant diet guidelines.   Update/Evaluation: Updated    Interval History  K+ has been high since 12/2017 (5.5, 5.7, 5.4). She reports having a list of foods at home and now is reading labels for K+, but is overwhelmed so overall is skipping meals.     Diet Recall  Breakfast Boiled eggs, cereal, oats, grits    Lunch Skips d/t diet restrictions    Dinner chix soup or tripe soup    Snacks Apples, oranges, grapes, berries   Beverages Some milk, grapefruit/apple/cranberry/orange juice    Alcohol Unknown    Dining out Unknown      Physical Activity  Unknown      Anthropometrics  Height:   65 in   BMI:    29.1    Weight Status:Overweight BMI 25-29.9   Weight:  175 lbs            IBW (lb): 125  % IBW: 140    Wt Hx: Wt stable since 10/2017.     Adj/dosing BW: 138 lbs/63 kg       Malnutrition  % Intake: Decreased intake does not meet criteria for malnutrition   % Weight Loss: None noted  Subcutaneous Fat Loss: None  Muscle Loss: None  Fluid Accumulation/Edema: Unknown  Malnutrition Diagnosis: Patient does not meet two of the above criteria necessary for diagnosing malnutrition     Nutrition Diagnosis  Excessive nutrient intake (potassium) related to pt eating high K+ foods as evidenced by high K+ levels x 1 month, reason for RD referral .     Nutrition Intervention  Provided list of high and low potassium foods and encouraged pt to keep list on fridge or in kitchen. Encouraged her to try almond milk instead of cow's milk and reviewed other foods she is  eating that may be contributing to a higher level.     Patient Understanding: Pt verbalized understanding of education provided.  Expected Compliance: Good  Follow-Up Plans: PRN     Nutrition Goals  Limit high K+ foods to reduce level to WNL     Provided pt with contact info.   Sasha Adams RD, LD  [facility] [phone]

## 2018-01-16 NOTE — TELEPHONE ENCOUNTER
DME order signed by provider and faxed - place on triage nurse desk if needed    Closing encounter - no further actions needed at this time    Jv Newberry RN

## 2018-01-16 NOTE — PROGRESS NOTES
SUBJECTIVE/OBJECTIVE:                           Kim Anne is a 72 year old female coming in for a follow up visit for Medication Therapy Management.  She was referred to me from MATT Montiel for HTN.     Chief Complaint: HTN, smoking cessation, Veltassa education    Allergies/ADRs: Reviewed in Epic  Tobacco: 0-1 pack per day - is interested in quittingTobacco Cessation Action Plan: Medication Therapy Management  (Referral to MTM)  Alcohol: not currently using  Caffeine: 1 cups/day of coffee, 4 cups of green tea daily  Activity: Has a pinched nerve in her back, sometimes she loses feelings in her feet.   PMH: Reviewed in Epic    Medication Adherence/Access  The patient misses their medication 0-2 times per week. Pt uses a pill box. Her grand daughter reminds her to take her meds. She took her meds today, typically only takes her medications in the morning. If she has a BID meds, she just doubles her dose and takes it once daily. She brought her medications in today as requested.  [facility]   Has the patient been offered to fill at [place]? No  Other programs or coupons used: Unknown    Smoking Cessation:  How much does she smoke:  Reduced to 4 cigarettes daily- 6-8 cigarettes daily. Her family is staying with her, and still giving her cigarettes. She spoke with them about it already, but to no avail.   Why does she smoke: Stress reliever   Triggers for smoking include:  Stress, influenced by peers/family members  How long has she been smoking:  About 40 years  What is the most she ever smoked:  10-12 cigarettes daily  Tried quitting in the past: Yes.  How many times:  6-7.  For how long: 3 months.  What worked/didn t work in the past: Cold turkey worked in the past.  Tried Nicotine patches, but continued smoking with use.   Why does she want to quit (motivators for quitting): avoid the unwanted smell of smoke and bad breath,  "granddaughter has asthma, help lower blood pressure, increase quality of life    Constipation: Pt is taking Docusate 200mg daily, uses Miralax prn. Having a BM every other day. No bloating or discomfort.     Hypertension/Edema: Current medications include Metoprolol ER 25mg daily Amlodipine 10mg daily, Hydralazine 50mg once daily.  Patient does not self-monitor BP.  Patient reports the following medication side effects: edema occurs in the afternoon/evening, returns to normal in the morning.     CKD: Pt is taking Ferrous sulfate daily, and Furosemide 80mg once daily. Hx of chronically elevated potassium, although today it is 4.8 after a dose of Kayexalate last week. She uses Sodium Bicarb if \"her levels are low\".  Ferritin and TSATs WNL.   Hemoglobin   Date Value Ref Range Status   12/15/2017 12.1 11.7 - 15.7 g/dL Final     Hypothyroidism: Patient is taking levothyroxine 200 mcg daily. Patient is having the following symptoms: none. She takes this with all her other medications in the morning.    Hyperlipidemia: Current therapy includes Atorvastatin 40mg once daily, has been out of this for 2 days, it was too cold to get to the pharmacy.  Pt reports no significant myalgias or other side effects.   The ASCVD Risk score ([place] DC Jr, et al., 2013) failed to calculate for the following reasons:    The patient has a prior MCI or stroke diagnosis    COPD: Current medications: Mometasone+Formoterol (Dulera) twice daily prn, Spiriva BID prn. Pt does not rinse their mouth after using steroid inhaler.  Pt is not taking either inhaler consistently. Has ran out of her Ventolin inhaler.   Pt is not experiencing side effects.   Pt reports the following symptoms: none.  Pt does have an COPD Action Plan on file.     Pain: Oxycodone 10mg TID scheduled. She is able to complete all her daily living activities with this med. When shopping her pain is around 8-9/10, at home it is controlled per patient.     Current labs include:  BP " Readings from Last 3 Encounters:   01/15/18 130/56   01/08/18 128/60   12/22/17 156/70     Today's Vitals: There were no vitals taken for this visit.  Lab Results   Component Value Date    A1C 6.6 10/25/2017   .  Lab Results   Component Value Date    CHOL 214 10/25/2017     Lab Results   Component Value Date    TRIG 212 10/25/2017     Lab Results   Component Value Date    HDL 67 10/25/2017     Lab Results   Component Value Date     10/25/2017       Liver Function Studies -   Recent Labs   Lab Test  12/15/17   0942  10/25/17   0639   PROTTOTAL   --   5.8*   ALBUMIN  2.0*  1.9*   BILITOTAL   --   0.2   ALKPHOS   --   164*   AST   --   13   ALT   --   16       Lab Results   Component Value Date    UCRR 55 12/15/2017    MICROL 5320 07/07/2017    UMALCR 6325.80 (H) 07/07/2017       Last Basic Metabolic Panel:  Lab Results   Component Value Date     12/22/2017      Lab Results   Component Value Date    POTASSIUM 5.7 12/22/2017     Lab Results   Component Value Date    CHLORIDE 117 12/22/2017     Lab Results   Component Value Date    BUN 37 12/22/2017     Lab Results   Component Value Date    CR 3.17 12/22/2017     GFR Estimate   Date Value Ref Range Status   01/08/2018 15 (L) >60 mL/min/1.7m2 Final     Comment:     Non  GFR Calc   12/22/2017 14 (L) >60 mL/min/1.7m2 Final     Comment:     Non  GFR Calc   12/15/2017 16 (L) >60 mL/min/1.7m2 Final     Comment:     Non  GFR Calc     TSH   Date Value Ref Range Status   10/20/2017 1.04 0.40 - 4.00 mU/L Final     Most Recent Immunizations   Administered Date(s) Administered     HepB 06/28/2012     Influenza (High Dose) 3 valent vaccine 09/28/2017     Influenza (IIV3) PF 11/04/2014     Pneumo Conj 13-V (2010&after) 01/05/2015     Pneumococcal 23 valent 04/01/2011     TD (ADULT, 7+) 07/12/2007     TDAP Vaccine (Adacel) 05/18/2011     Twinrix A/B 03/21/2012     Zoster vaccine, live 04/30/2012       ASSESSMENT:                                Medication Adherence: Pt states she is taking medications daily and does not miss doses frequently, but pt is a poor historian.     Smoking Cessation: Needs improvement. Discussed NRT with patient. She would like to try Nicotine Lozenges. Pt should have another conversation with family about quitting smoking and ask for their support.     Constipation: Stable.     Hypertension/Edema: Needs improvement. BP not at goal today <140/90, was at goal yesterday. Pt to start monitoring at home today.     CKD: Needs improvement. Wendy Espinal and I have been working on securing a free trial of Veltassa from  due to hyperkalemia. Pt signed release forms today. Discussed its use.     Hypothyroidism: Stable. Last TSH WNL.     Hyperlipidemia: Stable. Pt to  refill today.     COPD: Needs improvement. We discussed correct use of maintenance vs rescue inhalers. It is difficult to get information from patient regarding her SOB and breathing as she is a poor historian. Pt was told to Start using Spiriva every day and get a refill of Ventolin.  We will start her taking Spiriva daily as I do not think she will be able to handle starting Dulera and Spiriva at the same time.     Pain: Stable. Pain controlled per patient.     PLAN:                            MATT Montiel, consider...  1. Starting 2mg Nicotine lozenges    Pt to...  1. Start checking your blood pressures daily at home and write them down.   2. Start using Spiriva every day. Refill Ventolin inhaler.   3. Talk to pharmacy about refilling Ventolin inhaler. This inhaler is a rescue inhaler meant to be used when you are having shortness of breath.   4. Continue taking all your medications daily.  5. Start taking Veltassa one packet daily. Mix with 8 oz of water and drink daily with food AT LEAST 3 HOURS apart from your other medications. Do not take with your other medications. Pending     I spent 45 minutes with this patient  today. I offer these suggestions for consideration by the nephrology. A copy of the visit note was provided to the patient's renal care provider.     Will follow up with patient in 1 month    The patient was given a summary of these recommendations as an after visit summary.     Denton Yuen, PharmD  [facility] Pharmacist    Phone: [phone]

## 2018-01-16 NOTE — TELEPHONE ENCOUNTER
Routing to provider - Ezequiel - please review and advise as appropriate  1. Clarification:  DME - Compression stockings  2. Please provide strength - order pended    Thank you,  Jv Newberry RN

## 2018-01-16 NOTE — LETTER
1/16/2018       RE: Kim Anne  [address]  [place] [place] [address]     Dear Colleague,    Thank you for referring your patient, Kim Anne, to the [facility] SOLID ORGAN TRANSPL[facility] at [facility]. Please see a copy of my visit note below.    Dialysis Access Service  Consult Note    Referred by Dr. Villela for creation of permanent dialysis access.    HPI: Ms. Anne is being seen today for placement of permanent dialysis access due to Chronic renal failure from Hypertension and Type 2 Diabetes and solitary kidney (s/p donation in 1988).  Ms. Anne is not dialyzing at (Not currently on dialysis).      Past Surgical History:   Procedure Laterality Date     APPENDECTOMY       BLEPHAROPLASTY BILATERAL  9/18/2013    Procedure: BLEPHAROPLASTY BILATERAL;  BILATERAL UPPER EYELID BLEPHAROPLASTY ;  Surgeon: Olayinka Lyon MD;  Location:  [facility]     CATARACT IOL, RT/LT Bilateral 2016     CHOLECYSTECTOMY       COLONOSCOPY  7/15/2011    polyps repeat in 5 years     elected term pregnancy       HYSTEROSCOPIC PLACEMENT CONTRACEPTIVE DEVICE       KIDNEY SURGERY  1988    donated left kideny     OVARY SURGERY      left for cyst benign     subclavian stent  august 2010    LUMA Liao       Risk factors for vascular access are:     Hx of CVC    []    Comment:   Hx of PICC line         []    Comment:   Hx of Pacemaker    []    Comment:   History of failed access:  []           SVC syndrome   []       Heart Failure    []    EF:    Periph arterial disease  [x]    L subclavian stent  Prior Fracture/Surgery  []    Location:   DVT    []    Location:  Diabetes   [x]         Neuropathy   [x]    Comment:   Anticoagulation:   []    Agent:      Anticoagulation contraindication: []    Details:                   Pediatric    []                           Hx of transplant   []   Comment:     Current immunosuppression []   Comment:                                  PHYSICAL EXAM:     alert  and cooperative  EXTREMITY EXAM:   Vein Exam: Obvious suitable veins?    Left arm: YES   []           NO  [x]       Comment:    Right arm: YES   []           NO  [x]       Comment:   Arterial Exam:   Radial   L: 2+ R: 2+   Ulnar   L: 2+;R: 2+  Patent Palmar arch  L: YES   []  NO   []       R: YES   []   NO   []        Capillary refill:  L: <2  sec, R:< 2 sec    Sensory exam:   Left hand: Normal   [x]       Abnormal   []     Comment:    Right hand: Normal   [x]       Abnormal   []     Comment:     Motor exam normal:   Left hand: Normal   [x]       Abnormal   []     Comment:     Right hand: Normal   [x]       Abnormal   []     Comment:                       Mapping Reviewed:  Target vein: 3 mm left cephalic vein at antecubital fossa   Target artery radial artery at the proximal forearm    Assessment & Plan: Ms. Anne is a fair candidate for placement of permanent dialysis access.  I would recommend ?left radial artery to cephalic vein forearm AVG creation under Block with MAC .     The surgical risks and benefits were reviewed and questions were answered. We discussed the day of surgery plan, anesthesia, postop care, risk of infection, numbness, injury to surrounding structures, bleeding, thrombosis, steal syndrome, possible need for future angioplasty or surgical revision, as well as nonmaturation or need for site abandonment. This was contrasted with morbidity and mortality risk of long-term catheter based hemodialysis access. The patient was counselled to defer access construction in lieu of determining transplant candidacy due to poor vascular targets.  I told her that once we sort out her estimated time until she needs to initiate dialysis, and whether she might be appropriate for transplant in a preemptive fashion (as she didn't seem imformed about transplant I encouraged referral, and later found she had gone thru evaluation last fall.  ???).  If it is determined that transplant is not in her best interest,  I would wait to place her AVG access until she is within a month of initiation since she does not have suitable veins. The patient was counselled to contact our nurse coordinator, JUNIOR Dixon (Sum), [facility] at [phone] with any questions or concerns.  Thank you for the opportunity to participate in Ms. Anen's care.    TT: 35 min, CT: 25 min.            Maverick Kramer MD  Department of Surgery

## 2018-01-16 NOTE — PATIENT INSTRUCTIONS
Recommendations from today's MTM visit:                                                      1. I will talk to Wendy about sending over Nicotine lozenges place these between your gum and lip until it dissolves. Do not suck on it like a normal hard candy, this can cause as stomach ache.  Telling your family that you live with that you are quitting smoking and you would appreciate it if they didn't give you cigarettes.     2. Start checking your blood pressures daily at home and write them down.     3. Use Spiriva every day, but only once daily. Rinse your mouth after using your Dulera inhaler (the blue one). Rinsing your mouth can prevent a yeast infection. These inhalers are used to prevent shortness of breath, they will not work quickly enough to treat when you are already having shortness of breath.     4. Talk to pharmacy about refilling Ventolin inhaler. This inhaler is a rescue inhaler meant to be used when you are having shortness of breath.     5. Continue taking all your medications daily.    6. Start taking Veltassa one packet daily. Mix with 8 oz of water and drink daily with food AT LEAST 3 HOURS apart from your other medications. Do not take with your other medications.    Next MTM visit: 2/16/18 at 11:30am.     To schedule another MTM appointment, please call the clinic directly or you may call the MTM scheduling line at [phone] or toll-free at [phone].     My Clinical Pharmacist's contact information:                                                      It was a pleasure seeing you today!  Please feel free to contact me with any questions or concerns you have.      Denton Yuen, PharmDAGO  MTM Pharmacist    Phone: [phone]     You may receive a survey about the MT services you received.  I would appreciate your feedback to help me serve you better in the future. Please fill it out and return it when you can. Your comments will be anonymous.

## 2018-01-16 NOTE — Clinical Note
I got Steven robleroatin signed, discussed  it from other medications. FYI she has been taking her medications double dosed once daily instead of twice (ie. Hydralazine, Furosemide). I don't know if she could handle twice daily dosing at this point.   I think patient would benefit from Nicotine 2mg lozenges as well, as she is struggling to quit smoking. You give me the okay and I will send them over. Thanks!  Denton Yuen, PharmD [facility] Pharmacist  Phone: [phone]

## 2018-01-16 NOTE — MR AVS SNAPSHOT
After Visit Summary   1/16/2018    Kim Anne    MRN: [id number]           Patient Information     Date Of Birth          1945        Visit Information        Provider Department      1/16/2018 9:40 AM Maverick Kramer MD [facility]        Today's Diagnoses     Chronic kidney disease, stage 5, kidney failure (H)    -  1    Single kidney        Type 2 diabetes mellitus with stage 5 chronic kidney disease not on chronic dialysis, with long-term current use of insulin (H)        Essential hypertension        PVD (peripheral vascular disease) (H)        Smoker           Follow-ups after your visit        Your next 10 appointments already scheduled     Jan 29, 2018  1:00 PM CST   (Arrive by 12:45 PM)   NEW HEART FAILURE with Wyatt Schneider MD   [facility] ([facility])    [address]  [address]  [facility] [address] [phone]            Feb 12, 2018 11:30 AM CST   Return Visit with LAMINE Chahal   [facility] ([facility])    [address]  [address]  [facility] [address] [phone]            Feb 12, 2018 11:30 AM CST   Return Visit with JUNIOR Bishop CNP   [facility] ([facility])    [address]  [address]  [facility] [address] [phone]            Feb 16, 2018 10:00 AM CST   Lab with GIOVANNA LAB   [facility] ([facility])    [address]  1st Floor  [facility] [address] [phone]            Feb 16, 2018 11:00 AM CST   (Arrive by 10:30 AM)   Return Visit with JANE Montiel [facility] ([facility])    [address]  [address]  [place] [facility] [address] [phone]            Feb 16, 2018 11:30 AM CST   (Arrive by 11:15 AM)   SHORT with Denton Yuen RPH     [facility] ([facility])    [address]  3rd Floor  [facility] [address]   [phone]            Mar 29, 2018  7:30 AM CDT   PFT VISIT with GIOVANNA PFL B   [facility] ([facility])    [address]  3rd Floor  [facility] [address]   [phone]            Mar 29, 2018  8:00 AM CDT   (Arrive by 7:45 AM)   Return Visit with Margret Packer MD   [facility] ([facility])    [address]  [address]  [facility] [address] [phone]              Who to contact     If you have questions or need follow up information about today's clinic visit or your schedule please contact [facility] [employer] directly at [phone].  Normal or non-critical lab and imaging results will be communicated to you by Hatsizehart, letter or phone within 4 business days after the clinic has received the results. If you do not hear from us within 7 days, please contact the clinic through Hatsizehart or phone. If you have a critical or abnormal lab result, we will notify you by phone as soon as possible.  Submit refill requests through ZilloPay or call your pharmacy and they will forward the refill request to us. Please allow 3 business days for your refill to be completed.          Additional Information About Your Visit        ZilloPay Information     ZilloPay lets you send messages to your doctor, view your test results, renew your prescriptions, schedule appointments and more. To sign up, go to [email or web address] . Click on \"Log in\" on the left side of the screen, which will take you to the Welcome page. Then click on \"Sign up Now\" on the right side of the page.     You will be asked to enter the access code listed below, as well as some personal information. Please follow the directions to create your username and password.     Your access " code is: [email or web address]  Expires: 3/1/2018  6:30 AM     Your access code will  in 90 days. If you need help or a new code, please call your [place] clinic or [phone].        Care EveryWhere ID     This is your Care EveryWhere ID. This could be used by other organizations to access your [place] medical records  [id number]         Blood Pressure from Last 3 Encounters:   18 144/74   01/15/18 130/56   18 128/60    Weight from Last 3 Encounters:   01/15/18 79.4 kg (175 lb)   12/15/17 80.1 kg (176 lb 8 oz)   10/25/17 80.6 kg (177 lb 9.6 oz)              Today, you had the following     No orders found for display         Today's Medication Changes          These changes are accurate as of: 18 11:59 PM.  If you have any questions, ask your nurse or doctor.               Start taking these medicines.        Dose/Directions    nicotine polacrilex 2 MG lozenge   Commonly known as:  COMMIT   Used for:  Chronic obstructive pulmonary disease, unspecified COPD type (H)   Started by:  Denton Yuen, [facility]        Dissolve 1 lozenge orally every 4 hours as directed.   Quantity:  100 lozenge   Refills:  2         These medicines have changed or have updated prescriptions.        Dose/Directions    furosemide 40 MG tablet   Commonly known as:  LASIX   This may have changed:    - how much to take  - when to take this   Used for:  Hyperkalemia, Edema, unspecified type        Dose:  40 mg   Take 1 tablet (40 mg) by mouth 2 times daily   Quantity:  60 tablet   Refills:  3       hydrALAZINE 25 MG tablet   Commonly known as:  APRESOLINE   This may have changed:    - how much to take  - when to take this   Used for:  HTN (hypertension), Chronic kidney disease, stage 4 (severe) (H)        Dose:  25 mg   Take 1 tablet (25 mg) by mouth 2 times daily   Quantity:  60 tablet   Refills:  1       * order for DME   This may have changed:  Another medication with the same name was changed. Make sure you understand  how and when to take each.   Used for:  Risk for falls   Changed by:  Mai Babcock MD        One wheeled walker with seat and brakes and basket   Quantity:  1 Device   Refills:  0       * order for DME   This may have changed:  Another medication with the same name was changed. Make sure you understand how and when to take each.   Used for:  Edema, unspecified type   Changed by:  Nazario Mcneal PA-C        Equipment being ordered: two pairs moderate knee high support hose   Quantity:  2 Device   Refills:  1       * order for DME   This may have changed:  additional instructions   Used for:  Localized edema   Changed by:  Ailyn Nieves APRN CNP        Equipment being ordered: Compression socks. Strength:15-20 mmHg   Quantity:  2 each   Refills:  0       * Notice:  This list has 3 medication(s) that are the same as other medications prescribed for you. Read the directions carefully, and ask your doctor or other care provider to review them with you.      Stop taking these medicines if you haven't already. Please contact your care team if you have questions.     glucose-vitamin C 4-0.006 G Chew   Commonly known as:  DEX4 GLUCOSE   Stopped by:  Denton Yuen, [facility]                Where to get your medicines      These medications were sent to [facility] [place], [place] - [address]  [address] [facility] [address]     Phone:  [phone]     nicotine polacrilex 2 MG lozenge         Some of these will need a paper prescription and others can be bought over the counter.  Ask your nurse if you have questions.     Bring a paper prescription for each of these medications     order for DME                Primary Care Provider Office Phone # Fax #    JUNIOR Bishop [phone]       [address]  [facility] [address]        Equal Access to Services     CECIL TOLLIVER AH: keo Solano qaybta  kassandra aguilar giselledeborah aguilarfermin white ahChen [facility] [phone].    ATENCIÓN: Si ashlie henriquez, tiene a bailey disposición servicios gratuitos de asistencia lingüística. Khadijah al [phone].    We comply with applicable federal civil rights laws and [place] laws. We do not discriminate on the basis of race, color, national origin, age, disability, sex, sexual orientation, or gender identity.            Thank you!     Thank you for choosing [facility] [employer]  for your care. Our goal is always to provide you with excellent care. Hearing back from our patients is one way we can continue to improve our services. Please take a few minutes to complete the written survey that you may receive in the mail after your visit with us. Thank you!             Your Updated Medication List - Protect others around you: Learn how to safely use, store and throw away your medicines at www.disposemymeds.org.          This list is accurate as of: 1/16/18 11:59 PM.  Always use your most recent med list.                   Brand Name Dispense Instructions for use Diagnosis    albuterol 108 (90 BASE) MCG/ACT Inhaler    PROAIR HFA/PROVENTIL HFA/VENTOLIN HFA    18 g    Inhale 2 puffs into the lungs every 6 hours as needed for shortness of breath / dyspnea or wheezing    Chronic obstructive pulmonary disease, unspecified COPD type (H)       amLODIPine 5 MG tablet    NORVASC    60 tablet    Take 2 tablets (10 mg) by mouth daily    Renovascular hypertension       ASPIRIN LOW DOSE 81 MG EC tablet   Generic drug:  aspirin     30 tablet    Take 1 tablet (81 mg) by mouth daily    History of MI (myocardial infarction), Essential hypertension, benign       atorvastatin 40 MG tablet    LIPITOR    90 tablet    Take 1 tablet (40 mg) by mouth daily    Hyperlipidemia LDL goal <100       blood glucose monitoring lancets     1 Box    Test BS four times daily as directed    Type 2 diabetes mellitus without complication, with  long-term current use of insulin (H)       blood glucose monitoring test strip    FREESTYLE LITE    50 strip    TEST BLOOD SUGAR TWO TIMES A DAY    Type 2 diabetes mellitus without complication, with long-term current use of insulin (H)       docusate sodium 100 MG capsule    COLACE    60 capsule    Take 1 capsule (100 mg) by mouth 2 times daily    Constipation, unspecified constipation type       EUCERIN CALMING DAILY MOIST Crea     1 Tube    Externally apply 1 dose * topically daily    Type II or unspecified type diabetes mellitus with neurological manifestations, not stated as uncontrolled(250.60) (H)       ferrous sulfate 325 (65 FE) MG tablet    IRON    100 tablet    Take 1 tablet (325 mg) by mouth daily (with breakfast)    Anemia, iron deficiency       furosemide 40 MG tablet    LASIX    60 tablet    Take 1 tablet (40 mg) by mouth 2 times daily    Hyperkalemia, Edema, unspecified type       hydrALAZINE 25 MG tablet    APRESOLINE    60 tablet    Take 1 tablet (25 mg) by mouth 2 times daily    HTN (hypertension), Chronic kidney disease, stage 4 (severe) (H)       insulin glargine 100 UNIT/ML injection    LANTUS    15 mL    Inject 10 Units Subcutaneous every morning    Controlled type 2 diabetes mellitus with stage 4 chronic kidney disease, with long-term current use of insulin (H)       insulin pen needle 31G X 6 MM     120 each    Use as directed    Type 2 diabetes mellitus without complication (H)       levothyroxine 200 MCG tablet    SYNTHROID/LEVOTHROID    90 tablet    Take 1 tablet (200 mcg) by mouth See Admin Instructions New daily increase. Recheck labs in 8 weeks.    Other specified hypothyroidism       metoprolol succinate 25 MG 24 hr tablet    TOPROL-XL    90 tablet    Take 1 tablet (25 mg) by mouth daily    Hypertension associated with diabetes (H)       mometasone-formoterol 100-5 MCG/ACT oral inhaler    DULERA    1 Inhaler    Inhale 2 puffs into the lungs 2 times daily    COPD (chronic obstructive  pulmonary disease) (H)       nicotine polacrilex 2 MG lozenge    COMMIT    100 lozenge    Dissolve 1 lozenge orally every 4 hours as directed.    Chronic obstructive pulmonary disease, unspecified COPD type (H)       nitroGLYcerin 0.4 MG sublingual tablet    NITROSTAT    25 tablet    Place 1 tablet (0.4 mg) under the tongue every 5 minutes as needed for chest pain    History of MI (myocardial infarction)       nystatin 615425 UNIT/GM Powd    MYCOSTATIN    30 g    Apply 1 g topically 3 times daily as needed    Groin rash       * order for DME     1 Device    One wheeled walker with seat and brakes and basket    Risk for falls       * order for DME     2 Device    Equipment being ordered: two pairs moderate knee high support hose    Edema, unspecified type       * order for DME     2 each    Equipment being ordered: Compression socks. Strength:15-20 mmHg    Localized edema       oxyCODONE IR 10 MG tablet    ROXICODONE    90 tablet    Take 1 tablet (10 mg) by mouth every 8 hours as needed    Chronic low back pain without sciatica, unspecified back pain laterality       polyethylene glycol powder    MIRALAX    510 g    Take 17 g (1 capful) by mouth daily    Slow transit constipation       SM ALCOHOL PREP 70 % Pads     100 each    Externally apply 1 pad topically 4 times daily    Type 2 diabetes mellitus without complication, with long-term current use of insulin (H)       sodium bicarbonate 325 MG tablet     180 tablet    Take 2 tablets (650 mg) by mouth 3 times daily    Acidosis, CKD (chronic kidney disease) stage 4, GFR 15-29 ml/min (H)       tiotropium 18 MCG capsule    SPIRIVA HANDIHALER    90 capsule    Inhale contents of one capsule daily.    Pulmonary emphysema, unspecified emphysema type (H)       vitamin D 2000 UNITS tablet     60 tablet    Take 2,000 Units by mouth daily    Vitamin D deficiency disease       * Notice:  This list has 3 medication(s) that are the same as other medications prescribed for you.  Read the directions carefully, and ask your doctor or other care provider to review them with you.

## 2018-01-16 NOTE — MR AVS SNAPSHOT
After Visit Summary   1/16/2018    Kim Anne    MRN: [id number]           Patient Information     Date Of Birth          1945        Visit Information        Provider Department      1/16/2018 11:00 AM [facility]        Care Instructions    Recommendations from today's MT visit:                                                      1. I talk to Wendy about sending over Nicotine lozenges place these between your gum and lip until it dissolves. Do not suck on it like a normal hard candy, this can cause as stomach ache.  Telling your family that you live with that you are quitting smoking and you would appreciate it if they didn't give you cigarettes.     2. Start checking your blood pressures daily at home and write them down.     3. Use Spiriva ever day, but only once daily. Rinse your mouth after using your Dulera inhaler (the blue one). Rinsing your mouth can prevent a yeast infection. These inhalers are used to prevent shortness of breath, they will not work quickly enough to treat when you are already having shortness of breath.     4. Talk to pharmacy about refilling Ventolin inhaler. This inhaler is a rescue inhaler meant to be used when you are having shortness of breath.     5. Continue taking all your medications daily.    6. Start taking Veltassa one packet daily. Mix with 8 oz of water and drink daily with food AT LEAST 3 HOURS apart from your other medications. Do not take with your other medications.    Next MTM visit: 2/16/18 at 11:30am.     To schedule another MT appointment, please call the clinic directly or you may call the [facility] scheduling line at [phone] or toll-free at [phone].     My Clinical Pharmacist's contact information:                                                      It was a pleasure seeing you today!  Please feel free to contact me with any questions or concerns you have.      Denton  Wilfrid, Sofía  MTM Pharmacist    Phone: [phone]     You may receive a survey about the MT services you received.  I would appreciate your feedback to help me serve you better in the future. Please fill it out and return it when you can. Your comments will be anonymous.              Follow-ups after your visit        Your next 10 appointments already scheduled     Jan 16, 2018 12:30 PM CST   NUTRITION VISIT with Sasha Adams RD   [facility] ([facility])    [address]  [address]  [facility] [facility] [id number] [phone]            Jan 29, 2018  1:00 PM CST   (Arrive by 12:45 PM)   NEW HEART FAILURE with Wyatt Schneider MD   [facility] ([facility])    [address]  [address]  [place] [id number] [phone]            Feb 12, 2018 11:30 AM CST   Return Visit with LAMINE Chahal   [facility] ([facility])    [address]  [address]  [place] [facility] [address] [phone]            Feb 12, 2018 11:30 AM CST   Return Visit with JUNIOR Bishop CNP   [facility] ([facility])    [address]  [address]  [facility] [facility] [address] [phone]            Feb 16, 2018 10:00 AM CST   Lab with  LAB    [facility] ([facility])    [address]  1st Floor  [place] [facility] [id number] [phone]            Feb 16, 2018 11:00 AM CST   (Arrive by 10:30 AM)   Return Visit with Wendy Espinal PA-C   [facility] Nephrology ([facility])    [address]  [address]  [place] [facility] [id number] [phone]            Feb 16, 2018 11:30 AM CST   (Arrive by 11:15 AM)   [facility]    [address] [place] [address]  "[address]  [address] [facility] [address]   [phone]            Mar 29, 2018  7:30 AM CDT   PFT VISIT with GIOVANNA PFL B   [facility] ([facility])    [address]  [address] [facility] [address]   [phone]            Mar 29, 2018  8:00 AM CDT   (Arrive by 7:45 AM)   Return Visit with Margret Packer MD   [facility] ([facility])    [address]  [address]  [place] [address]   [phone]              Future tests that were ordered for you today     Open Future Orders        Priority Expected Expires Ordered    **TSH with free T4 reflex FUTURE 1yr Routine 12/16/2018 1/15/2019 1/15/2018            Who to contact     If you have questions or need follow up information about today's clinic visit or your schedule please contact [facility] MEDICATION THERAPY MANAGEMENT directly at [phone].  Normal or non-critical lab and imaging results will be communicated to you by Rormixhart, letter or phone within 4 business days after the clinic has received the results. If you do not hear from us within 7 days, please contact the clinic through Plickerst or phone. If you have a critical or abnormal lab result, we will notify you by phone as soon as possible.  Submit refill requests through ArmedZilla or call your pharmacy and they will forward the refill request to us. Please allow 3 business days for your refill to be completed.          Additional Information About Your Visit        ArmedZilla Information     ArmedZilla lets you send messages to your doctor, view your test results, renew your prescriptions, schedule appointments and more. To sign up, go to [email or web address] . Click on \"Log in\" on the left side of the screen, which will take you to the Welcome page. Then click on \"Sign up Now\" on the right side of the page.     You will be asked to enter the access code listed below, as well as " some personal information. Please follow the directions to create your username and password.     Your access code is: [email or web address]  Expires: 3/1/2018  6:30 AM     Your access code will  in 90 days. If you need help or a new code, please call your [facility] clinic or [phone].        Care EveryWhere ID     This is your Care EveryWhere ID. This could be used by other organizations to access your [facility] medical records  [id number]        Your Vitals Were     Pulse                   62            Blood Pressure from Last 3 Encounters:   18 144/74   01/15/18 130/56   18 128/60    Weight from Last 3 Encounters:   01/15/18 175 lb (79.4 kg)   12/15/17 176 lb 8 oz (80.1 kg)   10/25/17 177 lb 9.6 oz (80.6 kg)              Today, you had the following     No orders found for display         Today's Medication Changes          These changes are accurate as of: 18 12:09 PM.  If you have any questions, ask your nurse or doctor.               These medicines have changed or have updated prescriptions.        Dose/Directions    furosemide 40 MG tablet   Commonly known as:  LASIX   This may have changed:    - how much to take  - when to take this   Used for:  Hyperkalemia, Edema, unspecified type        Dose:  40 mg   Take 1 tablet (40 mg) by mouth 2 times daily   Quantity:  60 tablet   Refills:  3       hydrALAZINE 25 MG tablet   Commonly known as:  APRESOLINE   This may have changed:    - how much to take  - when to take this   Used for:  HTN (hypertension), Chronic kidney disease, stage 4 (severe) (H)        Dose:  25 mg   Take 1 tablet (25 mg) by mouth 2 times daily   Quantity:  60 tablet   Refills:  1         Stop taking these medicines if you haven't already. Please contact your care team if you have questions.     glucose-vitamin C 4-0.006 G Chew   Commonly known as:  DEX4 GLUCOSE   Stopped by:  Denton Yuen, [facility]                    Primary Care Provider Office Phone # Fax #    Ailyn N  Ezequiel, APRN [facility] [phone]       [address] [facility] [address]  [facility] [address]        Equal Access to Services     CECIL TOLLIVER : Hadgaurav alysha redd prerna [place], wadellada luqralf, qaybta kaalmada stephanie, kassandra chaudharideborah sahara. So [facility] [phone].    ATENCIÓN: Si habla español, tiene a bailey disposición servicios gratuitos de asistencia lingüística. Llame al [phone].    We comply with applicable federal civil rights laws and [place] laws. We do not discriminate on the basis of race, color, national origin, age, disability, sex, sexual orientation, or gender identity.            Thank you!     Thank you for choosing [facility] MEDICATION THERAPY MANAGEMENT  for your care. Our goal is always to provide you with excellent care. Hearing back from our patients is one way we can continue to improve our services. Please take a few minutes to complete the written survey that you may receive in the mail after your visit with us. Thank you!             Your Updated Medication List - Protect others around you: Learn how to safely use, store and throw away your medicines at www.disposemymeds.org.          This list is accurate as of: 1/16/18 12:09 PM.  Always use your most recent med list.                   Brand Name Dispense Instructions for use Diagnosis    albuterol 108 (90 BASE) MCG/ACT Inhaler    PROAIR HFA/PROVENTIL HFA/VENTOLIN HFA    18 g    Inhale 2 puffs into the lungs every 6 hours as needed for shortness of breath / dyspnea or wheezing    Chronic obstructive pulmonary disease, unspecified COPD type (H)       amLODIPine 5 MG tablet    NORVASC    60 tablet    Take 2 tablets (10 mg) by mouth daily    Renovascular hypertension       ASPIRIN LOW DOSE 81 MG EC tablet   Generic drug:  aspirin     30 tablet    Take 1 tablet (81 mg) by mouth daily    History of MI (myocardial infarction), Essential hypertension, benign       atorvastatin 40 MG tablet    LIPITOR    90 tablet    Take 1  tablet (40 mg) by mouth daily    Hyperlipidemia LDL goal <100       blood glucose monitoring lancets     1 Box    Test BS four times daily as directed    Type 2 diabetes mellitus without complication, with long-term current use of insulin (H)       blood glucose monitoring test strip    FREESTYLE LITE    50 strip    TEST BLOOD SUGAR TWO TIMES A DAY    Type 2 diabetes mellitus without complication, with long-term current use of insulin (H)       docusate sodium 100 MG capsule    COLACE    60 capsule    Take 1 capsule (100 mg) by mouth 2 times daily    Constipation, unspecified constipation type       EUCERIN CALMING DAILY MOIST Crea     1 Tube    Externally apply 1 dose * topically daily    Type II or unspecified type diabetes mellitus with neurological manifestations, not stated as uncontrolled(250.60) (H)       ferrous sulfate 325 (65 FE) MG tablet    IRON    100 tablet    Take 1 tablet (325 mg) by mouth daily (with breakfast)    Anemia, iron deficiency       furosemide 40 MG tablet    LASIX    60 tablet    Take 1 tablet (40 mg) by mouth 2 times daily    Hyperkalemia, Edema, unspecified type       hydrALAZINE 25 MG tablet    APRESOLINE    60 tablet    Take 1 tablet (25 mg) by mouth 2 times daily    HTN (hypertension), Chronic kidney disease, stage 4 (severe) (H)       insulin glargine 100 UNIT/ML injection    LANTUS    15 mL    Inject 10 Units Subcutaneous every morning    Controlled type 2 diabetes mellitus with stage 4 chronic kidney disease, with long-term current use of insulin (H)       insulin pen needle 31G X 6 MM     120 each    Use as directed    Type 2 diabetes mellitus without complication (H)       levothyroxine 200 MCG tablet    SYNTHROID/LEVOTHROID    90 tablet    Take 1 tablet (200 mcg) by mouth See Admin Instructions New daily increase. Recheck labs in 8 weeks.    Other specified hypothyroidism       metoprolol succinate 25 MG 24 hr tablet    TOPROL-XL    90 tablet    Take 1 tablet (25 mg) by mouth  daily    Hypertension associated with diabetes (H)       mometasone-formoterol 100-5 MCG/ACT oral inhaler    DULERA    1 Inhaler    Inhale 2 puffs into the lungs 2 times daily    COPD (chronic obstructive pulmonary disease) (H)       nitroGLYcerin 0.4 MG sublingual tablet    NITROSTAT    25 tablet    Place 1 tablet (0.4 mg) under the tongue every 5 minutes as needed for chest pain    History of MI (myocardial infarction)       nystatin 935613 UNIT/GM Powd    MYCOSTATIN    30 g    Apply 1 g topically 3 times daily as needed    Groin rash       * order for DME     1 Device    One wheeled walker with seat and brakes and basket    Risk for falls       * order for DME     2 Device    Equipment being ordered: two pairs moderate knee high support hose    Edema, unspecified type       * order for DME     2 each    Equipment being ordered: Compression socks.    Localized edema       oxyCODONE IR 10 MG tablet    ROXICODONE    90 tablet    Take 1 tablet (10 mg) by mouth every 8 hours as needed    Chronic low back pain without sciatica, unspecified back pain laterality       patiromer 8.4 G packet    VELTASSA    30 each    Take 8.4 g by mouth daily    Hyperkalemia       polyethylene glycol powder    MIRALAX    510 g    Take 17 g (1 capful) by mouth daily    Slow transit constipation       SM ALCOHOL PREP 70 % Pads     100 each    Externally apply 1 pad topically 4 times daily    Type 2 diabetes mellitus without complication, with long-term current use of insulin (H)       sodium bicarbonate 325 MG tablet     180 tablet    Take 2 tablets (650 mg) by mouth 3 times daily    Acidosis, CKD (chronic kidney disease) stage 4, GFR 15-29 ml/min (H)       tiotropium 18 MCG capsule    SPIRIVA HANDIHALER    90 capsule    Inhale contents of one capsule daily.    Pulmonary emphysema, unspecified emphysema type (H)       vitamin D 2000 UNITS tablet     60 tablet    Take 2,000 Units by mouth daily    Vitamin D deficiency disease       * Notice:   This list has 3 medication(s) that are the same as other medications prescribed for you. Read the directions carefully, and ask your doctor or other care provider to review them with you.

## 2018-01-18 RX ORDER — POLYETHYLENE GLYCOL 3350 17 G
POWDER IN PACKET (EA) ORAL
Qty: 100 LOZENGE | Refills: 2 | Status: SHIPPED | OUTPATIENT
Start: 2018-01-18 | End: 2019-05-01

## 2018-01-18 NOTE — PROGRESS NOTES
Dialysis Access Service  Consult Note    Referred by Dr. Villela for creation of permanent dialysis access.    HPI: Ms. Anne is being seen today for placement of permanent dialysis access due to Chronic renal failure from Hypertension and Type 2 Diabetes and solitary kidney (s/p donation in 1988).  Ms. Anne is not dialyzing at (Not currently on dialysis).      Past Surgical History:   Procedure Laterality Date     APPENDECTOMY       BLEPHAROPLASTY BILATERAL  9/18/2013    Procedure: BLEPHAROPLASTY BILATERAL;  BILATERAL UPPER EYELID BLEPHAROPLASTY ;  Surgeon: Olayinka Lyon MD;  Location: [facility]     CATARACT IOL, RT/LT Bilateral 2016     CHOLECYSTECTOMY       COLONOSCOPY  7/15/2011    polyps repeat in 5 years     elected term pregnancy       HYSTEROSCOPIC PLACEMENT CONTRACEPTIVE DEVICE       KIDNEY SURGERY  1988    donated left kideny     OVARY SURGERY      left for cyst benign     subclavian stent  august 2010    LUMA Liao       Risk factors for vascular access are:     Hx of CVC    []    Comment:   Hx of PICC line         []    Comment:   Hx of Pacemaker    []    Comment:   History of failed access:  []           SVC syndrome   []       Heart Failure    []    EF:    Periph arterial disease  [x]    L subclavian stent  Prior Fracture/Surgery  []    Location:   DVT    []    Location:  Diabetes   [x]         Neuropathy   [x]    Comment:   Anticoagulation:   []    Agent:      Anticoagulation contraindication: []    Details:                   Pediatric    []                           Hx of transplant   []   Comment:     Current immunosuppression []   Comment:                                  PHYSICAL EXAM:     alert and cooperative  EXTREMITY EXAM:   Vein Exam: Obvious suitable veins?    Left arm: YES   []           NO  [x]       Comment:    Right arm: YES   []           NO  [x]       Comment:   Arterial Exam:   Radial   L: 2+ R: 2+   Ulnar   L: 2+;R: 2+  Patent Palmar arch  L: YES   []  NO   []       R: YES    []   NO   []        Capillary refill:  L: <2  sec, R:< 2 sec    Sensory exam:   Left hand: Normal   [x]       Abnormal   []     Comment:    Right hand: Normal   [x]       Abnormal   []     Comment:     Motor exam normal:   Left hand: Normal   [x]       Abnormal   []     Comment:     Right hand: Normal   [x]       Abnormal   []     Comment:                       Mapping Reviewed:  Target vein: 3 mm left cephalic vein at antecubital fossa   Target artery radial artery at the proximal forearm    Assessment & Plan: Ms. Anne is a fair candidate for placement of permanent dialysis access.  I would recommend ?left radial artery to cephalic vein forearm AVG creation under Block with MAC .     The surgical risks and benefits were reviewed and questions were answered. We discussed the day of surgery plan, anesthesia, postop care, risk of infection, numbness, injury to surrounding structures, bleeding, thrombosis, steal syndrome, possible need for future angioplasty or surgical revision, as well as nonmaturation or need for site abandonment. This was contrasted with morbidity and mortality risk of long-term catheter based hemodialysis access. The patient was counselled to defer access construction in lieu of determining transplant candidacy due to poor vascular targets.  I told her that once we sort out her estimated time until she needs to initiate dialysis, and whether she might be appropriate for transplant in a preemptive fashion (as she didn't seem imformed about transplant I encouraged referral, and later found she had gone thru evaluation last fall.  ???).  If it is determined that transplant is not in her best interest, I would wait to place her AVG access until she is within a month of initiation since she does not have suitable veins. The patient was counselled to contact our nurse coordinator, JUNIOR Dixon (Sum), CNS at [phone] with any questions or concerns.  Thank you for the opportunity to  participate in Ms. Anne's care.    TT: 35 min, CT: 25 min.            Maverick Kramer MD  Department of Surgery

## 2018-01-19 ENCOUNTER — CARE COORDINATION (OUTPATIENT)
Dept: CARE COORDINATION | Facility: CLINIC | Age: 73
End: 2018-01-19

## 2018-01-19 NOTE — PROGRESS NOTES
Clinic Care Coordination Contact  [facility]/Voicemail    Referral Source: PCP  Clinical Data: Care Coordinator Outreach  Outreach attempted x 1.  Left message on voicemail with call back information and requested return call.  Plan:  Care Coordinator will continue to follow.  SARABJIT Xiong    Care Coordinator  [facility]   [phone]

## 2018-01-22 ENCOUNTER — CARE COORDINATION (OUTPATIENT)
Dept: GERIATRIC MEDICINE | Facility: CLINIC | Age: 73
End: 2018-01-22

## 2018-01-22 NOTE — TELEPHONE ENCOUNTER
"Staff can print and fax paperwork that was filled out on 10/3/2017. \"The  [employer] Division is requesting a copy of the last physical in order to reinstate her driving privileges.\" Staff unable to find a recent physical. Please advise before staff faxes paperwork.    Latasha Esquivel MA    "

## 2018-01-22 NOTE — PROGRESS NOTES
Call placed to member to attempt to schedule annual home visit. Member deflected topic of home visit.  States will contact CM when she is available for a home visit.   Discussed recent MTM appt 1/16.  Member was unaware of recommendations.  CM reviewed these with member.    Batool Mai RN, BSN, PHN  [facility]  [phone]  Fax: [phone]

## 2018-01-25 ENCOUNTER — TELEPHONE (OUTPATIENT)
Dept: TRANSPLANT | Facility: CLINIC | Age: 73
End: 2018-01-25

## 2018-01-25 NOTE — LETTER
01/25/18    Kim Anne  [address] [place] [address]    Dear Kim,    It was a pleasure to see you recently for consideration of kidney transplantation. Your pre-transplant evaluation results were reviewed at our Multidisciplinary Selection Committee. The committee is requesting the following items are completed before determining your candidacy:    1. Cardiology recommendation for proceeding with kidney transplant. Please attend your appointment here on January 29, 2018 at 1:00 PM with Dr. Schneider for this.    2. Dental work needs to be up to date. Please make an appointment for a check up if you have not had one in the last 12 months.   3. PAP smear, mammogram and colonoscopy reports need to be sent to our Office. Please follow-up with your primary care provider to facilitate getting these appointments scheduled.   4. Continue to follow with your pulmonologist as recommended. Your next appointment is on March 29, 2018 for PFT's at 7:30 AM and then 8:00 AM with Dr. Packer.  5. 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     You have already been added onto the kidney wait list here today. You have been placed on INACTIVE status due to needing all of the above items successfully completed. Upon successful completion, you will be eligible to be changed to ACTIVE status. You will be notified by our Office when your status can be changed to ACTIVE.     Please have any potential live donors register now online with our Program to initiat their evaluations at [email or web address]. You will be notified by our Program in the event of an approved live donor.     Please feel free to call me with questions at [phone].     Sincerely,    Carlota Beckwith RN BSN Transplant Coordinator   Solid Organ Transplant  [facility],  [facility]    Enclosure: UNOS Letter     CC: JUNIOR Bishop CNP (PCP)

## 2018-01-25 NOTE — LETTER
2018    Kim Anne  [address] [facility] [address]      Dear Ms. Anne,    This letter is sent to confirm that you are a candidate in the kidney transplant program at the [facility].  You were placed on the kidney INACTIVE waitlist on 2018.  This means you will accumulate waiting time but not receive  donor calls. You have been placed on INACTIVE status due to an incomplete evaluation, please see my separate letter regarding the details of this. You will be notified by our Office when your status can be changed to ACTIVE.      Items we will need from you:      We will receive approval from your insurance company for the transplant procedure when your status is ready to be changed to ACTIVE.  It is critical that you notify us if there is any change in your insurance.  It is also important that you familiarize yourself with the details of your specific insurance policy.  Our patient  is available to assist you if you should have any questions regarding your coverage.      An ALA or PRA blood sample needs to be sent here every 3 months to match you with  donors or any potential living donors. Your next sample is due here by 2018. You can have this drawn at our Clinic or any [facility] by simply making an appointment. You can also have this done at an outside lab for which special mailing boxes (called mailers) will be sent to you directly from the Outreach Department.  You should take the physician order form and the  to your outside laboratory if you wish when it is time to for this testing to be done.  Additional mailers can be obtained by calling the Transplant Office and asking to speak to a kidney .      During this waiting period, we may request additional periodic laboratory tests with your primary physician.  It will be your responsibility to remind your physician to  forward your results to the Transplant Office.                We need to be kept informed of any changes in your medical condition such as:    o changes in your medications,   o significant changes in your health  o significant infections (such as pneumonia or abscesses)  o blood transfusions  o any condition which requires hospitalization  o any surgery      Remember to complete any routine cancer screening tests required before your transplant.  This includes colonoscopy; prostrate screening for men, and mammogram and gynecologic testing for women, as well as dental work.  Your primary care clinic can assist you with arranging for these exams.  Remind your caregivers to forward copies of the records and final reports.    We want you to know that our program has physician and surgeon coverage 24 hours a day, 365 days a year. If this coverage changes or there are substantial program changes, you will be notified in writing by letter.     Attached is a letter from the [employer] ([employer]). It describes the services and information offered to patients by [employer] and the Organ Procurement and Transplantation Network.    We appreciate having had the opportunity to participate in your care.  If you have questions, please feel free to call the Transplant Office at [phone] or [phone].      Sincerely,       Kidney Transplant Program    Enclosures: ALA/PRA Physician Order, Telephone Contact List, Travel Resources, [employer] Letter, Waitlist Information Update and While You Are Waiting  CC:   JUNIOR Bishop CNP (PCP)

## 2018-01-25 NOTE — LETTER
PHYSICIAN ORDER   ALA/PRA BLOOD    DATE & TIME ISSUED: 2018 12:54 PM  PATIENT NAME: Kim Anne   : 1945     [facility] MR#  [id number]     DIAGNOSIS/ICD-10 CODE: Awaiting Organ transplant [Z76.82}   EXPIRES: (1 YEAR AFTER DATE ISSUED)  EVERY 12 weeks / 3 months   1. Please draw 20ml of blood in red top (plain) tube for Antileukocyte Antibody (ALA or PRA).   2. Label tubes with the patient s name, complete lab slip.         3. Mailers, lab slips with instructions are sent to patient separately.      4. Call the Outreach Lab at [phone] to reorder mailers.       5. Mail blood to (this address is also on the mailers):    IMMUNOLOGY LABORATORY  [facility]  Room [address]  [address]  [address]        Lexa Garzon MD  Surgical Director, Kidney Transplantation

## 2018-01-25 NOTE — TELEPHONE ENCOUNTER
Staff printed paperwork from 10/3/2017 and AVS from 1/15/2018 and mailed information to [place] [employer] at [address] [place] [address].    Latasha Esquivel MA

## 2018-01-25 NOTE — TELEPHONE ENCOUNTER
Contacted patient to review outcome of selection committee meeting (See selection committee encounter).   Explained to patient that he/she needs to complete all components of the evaluation to be eligible for active status on the waiting list or to proceed with a live donor kidney transplant.   Reviewed next steps based on outcomes: needs to attend cardiology appt 01/29/2018; needs updated PAP, mammogram, colonoscopy - pt to schedule all with PCP; needs to continue to follow with pulm - next appt for RTC for PFT and provider on 03/29/2018.  Explained I will add her onto the kidney waitlist now on INACTIVE status due to an incomplete evaluation. Explained she will be eligible for changing to ACTIVE status when all of evaluation has been successfully completed.   Patient will be listed as inactive (is not on dialysis and evaluation is not complete)-will receive:   -An Evaluation Summary Letter indicating what is needed to complete evaluation-discussed with patient if they would like to have testing done with  [facility] or locally   -A listing letter indicating inactive status   -A PRA Order   -PRA Mailers  Confirmed with patient that on successful completion of outstanding components, patient is eligible for active status and they will receive a follow-up call.   Confirmed that patient has contact information for additional questions or concerns.  Pt expressed good understanding of all and was in good agreement with the plan.   I added pt onto DD kidney wait list today on INACTIVE status due to an incomplete evaluation. Pt is not yet on dialysis. Pt qualifies for wait time with an est GFR of 16 on 12/15/2017. Pt listed with option of KDPI > 85 % and consent is signed for this. Generated listing letter/PRA order and Transplant Eval Summary today in EPIC - routed to administrative asisstant for mailing.   Pt was a previous kidney donor in 1988 - called [employer] who will send me the form to complete and send back so patient  can get points for previous donation.

## 2018-01-26 ENCOUNTER — HOSPITAL ENCOUNTER (OUTPATIENT)
Dept: GENERAL RADIOLOGY | Facility: CLINIC | Age: 73
Discharge: HOME | End: 2018-01-26
Attending: NURSE PRACTITIONER | Admitting: NURSE PRACTITIONER
Payer: COMMERCIAL

## 2018-01-26 ENCOUNTER — OFFICE VISIT (OUTPATIENT)
Dept: FAMILY MEDICINE | Facility: CLINIC | Age: 73
End: 2018-01-26
Payer: COMMERCIAL

## 2018-01-26 VITALS
HEART RATE: 68 BPM | OXYGEN SATURATION: 96 % | BODY MASS INDEX: 28.29 KG/M2 | TEMPERATURE: 98.6 F | RESPIRATION RATE: 18 BRPM | SYSTOLIC BLOOD PRESSURE: 84 MMHG | WEIGHT: 170 LBS | DIASTOLIC BLOOD PRESSURE: 52 MMHG

## 2018-01-26 DIAGNOSIS — J44.9 CHRONIC OBSTRUCTIVE PULMONARY DISEASE, UNSPECIFIED COPD TYPE (H): ICD-10-CM

## 2018-01-26 DIAGNOSIS — R06.02 SHORTNESS OF BREATH: ICD-10-CM

## 2018-01-26 DIAGNOSIS — R05.9 COUGH: Primary | ICD-10-CM

## 2018-01-26 DIAGNOSIS — J00 ACUTE NASOPHARYNGITIS: ICD-10-CM

## 2018-01-26 PROCEDURE — 99214 OFFICE O/P EST MOD 30 MIN: CPT | Performed by: NURSE PRACTITIONER

## 2018-01-26 PROCEDURE — 71046 X-RAY EXAM CHEST 2 VIEWS: CPT

## 2018-01-26 RX ORDER — FLUTICASONE PROPIONATE 50 MCG
1-2 SPRAY, SUSPENSION (ML) NASAL DAILY
Qty: 1 BOTTLE | Refills: 1 | Status: SHIPPED | OUTPATIENT
Start: 2018-01-26 | End: 2019-04-03

## 2018-01-26 RX ORDER — AZITHROMYCIN 250 MG/1
TABLET, FILM COATED ORAL
Qty: 6 TABLET | Refills: 0 | Status: SHIPPED | OUTPATIENT
Start: 2018-01-26 | End: 2018-03-12

## 2018-01-26 RX ORDER — ALBUTEROL SULFATE 0.83 MG/ML
1 SOLUTION RESPIRATORY (INHALATION) ONCE
Qty: 3 ML | Refills: 0 | Status: SHIPPED | OUTPATIENT
Start: 2018-01-26 | End: 2018-01-26

## 2018-01-26 NOTE — MR AVS SNAPSHOT
After Visit Summary   1/26/2018    Kim Anne    MRN: [id number]           Patient Information     Date Of Birth          1945        Visit Information        Provider Department      1/26/2018 9:00 AM Ailyn Nieves APRN CNP [facility]        Today's Diagnoses     Cough    -  1    Shortness of breath        Acute nasopharyngitis          Care Instructions    -Chest X-ray today.   -Flonase and Azithromycin for respiratory illness.   -Call clinic with any questions or concerns.             Follow-ups after your visit        Your next 10 appointments already scheduled     Jan 29, 2018  1:00 PM CST   (Arrive by 12:45 PM)   NEW HEART FAILURE with Wyatt Schneider MD   [facility] ([facility])    [address]  [address]  [facility] [id number] [phone]            Feb 12, 2018 11:30 AM CST   Return Visit with LAMINE Chahal   [facility] ([facility])    [address]  [address]  [facility] [id number] [phone]            Feb 12, 2018 11:30 AM CST   Return Visit with JUNIOR Bishop CNP   [facility] ([facility])    [address]  [address]  [facility] [id number] [phone]            Feb 16, 2018 10:00 AM CST   Lab with  LAB   [facility] Lab ([facility])    [address]  1st Floor  [facility] [id number] [phone]            Feb 16, 2018 11:00 AM CST   (Arrive by 10:30 AM)   Return Visit with Wendy Espinal PA-C   [facility] Nephrology ([facility])    [address]  [address]  [facility] [id number] [phone]            Feb 16, 2018 11:30 AM CST   (Arrive by 11:15 AM)   SHORT with Denton Yuen RPH   [facility] Medication Therapy Management ([facility]  "[facility])    [address]  [address] [facility] [address]   [phone]            Mar 29, 2018  7:30 AM CDT   PFT VISIT with GIOVANNA CADENA   [facility] Pulmonary Function Testing ([facility])    [address]  [address] [facility] [address] [phone]            Mar 29, 2018  8:00 AM CDT   (Arrive by 7:45 AM)   Return Visit with Margret Packer MD   [facility] ([facility])    [address]  [address]  [facility] [address] [phone]              Who to contact     If you have questions or need follow up information about today's clinic visit or your schedule please contact [facility] directly at [phone].  Normal or non-critical lab and imaging results will be communicated to you by MyChart, letter or phone within 4 business days after the clinic has received the results. If you do not hear from us within 7 days, please contact the clinic through [facility] or phone. If you have a critical or abnormal lab result, we will notify you by phone as soon as possible.  Submit refill requests through [facility] or call your pharmacy and they will forward the refill request to us. Please allow 3 business days for your refill to be completed.          Additional Information About Your Visit        MyChart Information     [employer] lets you send messages to your doctor, view your test results, renew your prescriptions, schedule appointments and more. To sign up, go to [email or web address] . Click on \"Log in\" on the left side of the screen, which will take you to the Welcome page. Then click on \"Sign up Now\" on the right side of the page.     You will be asked to enter the access code listed below, as well as some personal information. Please follow the directions to create your username and password.     Your access code is: [email or web address]  Expires: 3/1/2018  6:30 AM   "   Your access code will  in 90 days. If you need help or a new code, please call your [place] clinic or [phone].        Care EveryWhere ID     This is your Care EveryWhere ID. This could be used by other organizations to access your [place] medical records  [id number]        Your Vitals Were     Pulse Temperature Respirations Pulse Oximetry BMI (Body Mass Index)       68 98.6  F (37  C) (Oral) 18 96% 28.29 kg/m2        Blood Pressure from Last 3 Encounters:   18 (!) 84/52   18 144/74   01/15/18 130/56    Weight from Last 3 Encounters:   18 170 lb (77.1 kg)   01/15/18 175 lb (79.4 kg)   12/15/17 176 lb 8 oz (80.1 kg)              We Performed the Following     XR Chest 2 Views          Today's Medication Changes          These changes are accurate as of 18  9:26 AM.  If you have any questions, ask your nurse or doctor.               Start taking these medicines.        Dose/Directions    azithromycin 250 MG tablet   Commonly known as:  ZITHROMAX   Used for:  Cough   Started by:  Ailyn Nieves APRN CNP        Two tablets first day, then one tablet daily for four days.   Quantity:  6 tablet   Refills:  0       fluticasone 50 MCG/ACT spray   Commonly known as:  FLONASE   Used for:  Acute nasopharyngitis   Started by:  Ailyn Nieves APRN CNP        Dose:  1-2 spray   Spray 1-2 sprays into both nostrils daily   Quantity:  1 Bottle   Refills:  1         These medicines have changed or have updated prescriptions.        Dose/Directions    * albuterol 108 (90 BASE) MCG/ACT Inhaler   Commonly known as:  PROAIR HFA/PROVENTIL HFA/VENTOLIN HFA   This may have changed:  Another medication with the same name was added. Make sure you understand how and when to take each.   Used for:  Chronic obstructive pulmonary disease, unspecified COPD type (H)   Changed by:  Ailyn Nieves APRN CNP        Dose:  2 puff   Inhale 2 puffs into the lungs every 6 hours as needed for shortness of  breath / dyspnea or wheezing   Quantity:  18 g   Refills:  3       * albuterol (2.5 MG/3ML) 0.083% neb solution   This may have changed:  You were already taking a medication with the same name, and this prescription was added. Make sure you understand how and when to take each.   Used for:  Shortness of breath   Changed by:  Ailyn Nieves APRN CNP        Dose:  1 vial   Take 1 vial (2.5 mg) by nebulization once for 1 dose   Quantity:  3 mL   Refills:  0       furosemide 40 MG tablet   Commonly known as:  LASIX   This may have changed:    - how much to take  - when to take this   Used for:  Hyperkalemia, Edema, unspecified type        Dose:  40 mg   Take 1 tablet (40 mg) by mouth 2 times daily   Quantity:  60 tablet   Refills:  3       hydrALAZINE 25 MG tablet   Commonly known as:  APRESOLINE   This may have changed:    - how much to take  - when to take this   Used for:  HTN (hypertension), Chronic kidney disease, stage 4 (severe) (H)        Dose:  25 mg   Take 1 tablet (25 mg) by mouth 2 times daily   Quantity:  60 tablet   Refills:  1       * Notice:  This list has 2 medication(s) that are the same as other medications prescribed for you. Read the directions carefully, and ask your doctor or other care provider to review them with you.         Where to get your medicines      These medications were sent to [facility] [place], [place] - [address]  [address], [facility] [address]     Phone:  [phone]     albuterol (2.5 MG/3ML) 0.083% neb solution    azithromycin 250 MG tablet    fluticasone 50 MCG/ACT spray                Primary Care Provider Office Phone # Fax #    JUNIOR Bishop [phone]       [address]  [facility] [address]        Equal Access to Services     CECIL TOLLIVER : Stephany Olmstead, keo delgadillo, gail brown, kassandra shoemaekr. So [facility] [phone].    ATENCIÓN: Si  ashlie henriquez, tiene a bailey disposición servicios gratuitos de asistencia lingüística. Khadijah duran [phone].    We comply with applicable federal civil rights laws and [place] laws. We do not discriminate on the basis of race, color, national origin, age, disability, sex, sexual orientation, or gender identity.            Thank you!     Thank you for choosing [facility]  for your care. Our goal is always to provide you with excellent care. Hearing back from our patients is one way we can continue to improve our services. Please take a few minutes to complete the written survey that you may receive in the mail after your visit with us. Thank you!             Your Updated Medication List - Protect others around you: Learn how to safely use, store and throw away your medicines at www.disposemymeds.org.          This list is accurate as of 1/26/18  9:26 AM.  Always use your most recent med list.                   Brand Name Dispense Instructions for use Diagnosis    * albuterol 108 (90 BASE) MCG/ACT Inhaler    PROAIR HFA/PROVENTIL HFA/VENTOLIN HFA    18 g    Inhale 2 puffs into the lungs every 6 hours as needed for shortness of breath / dyspnea or wheezing    Chronic obstructive pulmonary disease, unspecified COPD type (H)       * albuterol (2.5 MG/3ML) 0.083% neb solution     3 mL    Take 1 vial (2.5 mg) by nebulization once for 1 dose    Shortness of breath       amLODIPine 5 MG tablet    NORVASC    60 tablet    Take 2 tablets (10 mg) by mouth daily    Renovascular hypertension       ASPIRIN LOW DOSE 81 MG EC tablet   Generic drug:  aspirin     30 tablet    Take 1 tablet (81 mg) by mouth daily    History of MI (myocardial infarction), Essential hypertension, benign       atorvastatin 40 MG tablet    LIPITOR    90 tablet    Take 1 tablet (40 mg) by mouth daily    Hyperlipidemia LDL goal <100       azithromycin 250 MG tablet    ZITHROMAX    6 tablet    Two tablets first day, then one  tablet daily for four days.    Cough       blood glucose monitoring lancets     1 Box    Test BS four times daily as directed    Type 2 diabetes mellitus without complication, with long-term current use of insulin (H)       blood glucose monitoring test strip    FREESTYLE LITE    50 strip    TEST BLOOD SUGAR TWO TIMES A DAY    Type 2 diabetes mellitus without complication, with long-term current use of insulin (H)       docusate sodium 100 MG capsule    COLACE    60 capsule    Take 1 capsule (100 mg) by mouth 2 times daily    Constipation, unspecified constipation type       EUCERIN CALMING DAILY MOIST Crea     1 Tube    Externally apply 1 dose * topically daily    Type II or unspecified type diabetes mellitus with neurological manifestations, not stated as uncontrolled(250.60) (H)       ferrous sulfate 325 (65 FE) MG tablet    IRON    100 tablet    Take 1 tablet (325 mg) by mouth daily (with breakfast)    Anemia, iron deficiency       fluticasone 50 MCG/ACT spray    FLONASE    1 Bottle    Spray 1-2 sprays into both nostrils daily    Acute nasopharyngitis       furosemide 40 MG tablet    LASIX    60 tablet    Take 1 tablet (40 mg) by mouth 2 times daily    Hyperkalemia, Edema, unspecified type       hydrALAZINE 25 MG tablet    APRESOLINE    60 tablet    Take 1 tablet (25 mg) by mouth 2 times daily    HTN (hypertension), Chronic kidney disease, stage 4 (severe) (H)       insulin glargine 100 UNIT/ML injection    LANTUS    15 mL    Inject 10 Units Subcutaneous every morning    Controlled type 2 diabetes mellitus with stage 4 chronic kidney disease, with long-term current use of insulin (H)       insulin pen needle 31G X 6 MM     120 each    Use as directed    Type 2 diabetes mellitus without complication (H)       levothyroxine 200 MCG tablet    SYNTHROID/LEVOTHROID    90 tablet    Take 1 tablet (200 mcg) by mouth See Admin Instructions New daily increase. Recheck labs in 8 weeks.    Other specified hypothyroidism        metoprolol succinate 25 MG 24 hr tablet    TOPROL-XL    90 tablet    Take 1 tablet (25 mg) by mouth daily    Hypertension associated with diabetes (H)       mometasone-formoterol 100-5 MCG/ACT oral inhaler    DULERA    1 Inhaler    Inhale 2 puffs into the lungs 2 times daily    COPD (chronic obstructive pulmonary disease) (H)       nicotine polacrilex 2 MG lozenge    COMMIT    100 lozenge    Dissolve 1 lozenge orally every 4 hours as directed.    Chronic obstructive pulmonary disease, unspecified COPD type (H)       nitroGLYcerin 0.4 MG sublingual tablet    NITROSTAT    25 tablet    Place 1 tablet (0.4 mg) under the tongue every 5 minutes as needed for chest pain    History of MI (myocardial infarction)       nystatin 859344 UNIT/GM Powd    MYCOSTATIN    30 g    Apply 1 g topically 3 times daily as needed    Groin rash       * order for DME     1 Device    One wheeled walker with seat and brakes and basket    Risk for falls       * order for DME     2 Device    Equipment being ordered: two pairs moderate knee high support hose    Edema, unspecified type       * order for DME     2 each    Equipment being ordered: Compression socks. Strength:15-20 mmHg    Localized edema       oxyCODONE IR 10 MG tablet    ROXICODONE    90 tablet    Take 1 tablet (10 mg) by mouth every 8 hours as needed    Chronic low back pain without sciatica, unspecified back pain laterality       polyethylene glycol powder    MIRALAX    510 g    Take 17 g (1 capful) by mouth daily    Slow transit constipation       SM ALCOHOL PREP 70 % Pads     100 each    Externally apply 1 pad topically 4 times daily    Type 2 diabetes mellitus without complication, with long-term current use of insulin (H)       sodium bicarbonate 325 MG tablet     180 tablet    Take 2 tablets (650 mg) by mouth 3 times daily    Acidosis, CKD (chronic kidney disease) stage 4, GFR 15-29 ml/min (H)       tiotropium 18 MCG capsule    SPIRIVA HANDIHALER    90 capsule    Inhale  contents of one capsule daily.    Pulmonary emphysema, unspecified emphysema type (H)       vitamin D 2000 UNITS tablet     60 tablet    Take 2,000 Units by mouth daily    Vitamin D deficiency disease       * Notice:  This list has 5 medication(s) that are the same as other medications prescribed for you. Read the directions carefully, and ask your doctor or other care provider to review them with you.

## 2018-01-26 NOTE — PROGRESS NOTES
SUBJECTIVE:                                                    Kim Anne is a 72 year old  female who presents to clinic today for the following health issues:    Cold  Patient reporting symptoms from last week about Monday. Patient states that her grandson vomited in her bedroom while he was sick and she has been sick ever since. Patient states that she was the only one in the house that was not sick at that point. Patient is currently living with her son and grandson. Denies any sore throat,  Just throat dryness. Denies any sinus pressure or pain. Reports green phlegm with cough.     Duration: Last week    Description (location/character/radiation): Chills, past fever, cough, green sputum.    Intensity:  moderate, severe    Accompanying signs and symptoms: Runny nose as well.     History (similar episodes/previous evaluation): Grandson recently sick.    Precipitating or alleviating factors: None    Therapies tried and outcome: None      Sleep - cough does not keep patient awake at night. Patient states that she is able to sleep at night.   Appetite - good   Exercise - none.     Smoking - cut down, about 1 cigarette per day since getting sick. Has not any cigarette since yesterday. Denies any urges to want to smoke.   Alcohol - no  Street drugs - no  Marijuana - no  Caffeine - 1 cup per day.     PHQ-9  English PHQ-9   Any Language             Problems taking medications regularly: No    Medication side effects: none    Diet: regular (no restrictions)    Social History   Substance Use Topics     Smoking status: Current Every Day Smoker     Packs/day: 0.80     Years: 40.00     Types: Cigarettes     Last attempt to quit: 10/31/2016     Smokeless tobacco: Never Used     Alcohol use No        Problem list and histories reviewed & adjusted, as indicated.  Additional history: as documented    Patient Active Problem List   Diagnosis     Hyperlipidemia LDL goal <100     ARAUZ (dyspnea on exertion)      COPD (chronic obstructive pulmonary disease) (H)     Edema     Fatigue     History of MI (myocardial infarction)     Snores     Knee pain, left     Bunion of great toe of left foot     Dystrophic nail     Arthritis     Peripheral vascular disease (H)     Chronic low back pain     Health Care Home     CKD (chronic kidney disease) stage 4, GFR 15-29 ml/min (H)     Abnormal gait     Advance Care Planning     Vitamin D deficiency     Constipation     Hypertension goal BP (blood pressure) < 130/80     Bradycardia     Callus of foot     Other chronic pain     Cataracts, bilateral     Hyperopic astigmatism of both eyes     Presbyopia     Asymptomatic bunion, right     Acquired hypothyroidism     Type 2 diabetes mellitus with diabetic chronic kidney disease (H)     Hypertension associated with diabetes (H)     Hyperlipidemia associated with type 2 diabetes mellitus (H)     Obesity (BMI 30-39.9)     History of sick sinus syndrome     Nephrotic syndrome     Pneumonia     Grief     Cigarette nicotine dependence without complication     HCOM with LVOT     Hyperkalemia     Type 2 diabetes, HbA1C goal < 8% (H)     Smoker     Single kidney     Hypothyroid     Hypertension goal BP (blood pressure) < 140/90     Hyperlipidemia     Diabetic nephropathy (H)     Hypoalbuminemia     Past Surgical History:   Procedure Laterality Date     APPENDECTOMY       BLEPHAROPLASTY BILATERAL  9/18/2013    Procedure: BLEPHAROPLASTY BILATERAL;  BILATERAL UPPER EYELID BLEPHAROPLASTY ;  Surgeon: Olayinka Lyon MD;  Location:  [facility]     CATARACT IOL, RT/LT Bilateral 2016     CHOLECYSTECTOMY       COLONOSCOPY  7/15/2011    polyps repeat in 5 years     elected term pregnancy       HYSTEROSCOPIC PLACEMENT CONTRACEPTIVE DEVICE       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 No family hx of      Hypertension No family hx of      CEREBROVASCULAR DISEASE No family hx of      Breast Cancer No family hx of      Cancer - colorectal No family hx of      Prostate Cancer No family hx of      Allergies No family hx of      Alzheimer Disease No family hx of      Anesthesia Reaction No family hx of      Arthritis No family hx of      Blood Disease No family hx of      CANCER No family hx of      Cardiovascular No family hx of      Circulatory No family hx of      Congenital Anomalies No family hx of      Connective Tissue Disorder No family hx of      Depression No family hx of      Eye Disorder No family hx of      Genetic Disorder No family hx of      GASTROINTESTINAL DISEASE No family hx of      Genitourinary Problems No family hx of      Gynecology No family hx of      HEART DISEASE No family hx of      Lipids No family hx of      Musculoskeletal Disorder No family hx of      Neurologic Disorder No family hx of      Obesity No family hx of      OSTEOPOROSIS No family hx of      Psychotic Disorder No family hx of      Respiratory No family hx of      Thyroid Disease No family hx of      Glaucoma No family hx of      Macular Degeneration No family hx of            Current Outpatient Prescriptions   Medication Sig Dispense Refill     nicotine polacrilex (COMMIT) 2 MG lozenge Dissolve 1 lozenge orally every 4 hours as directed. 100 lozenge 2     order for DME Equipment being ordered: Compression socks.  Strength:15-20 mmHg 2 each 0     tiotropium (SPIRIVA HANDIHALER) 18 MCG capsule Inhale contents of one capsule daily. 90  capsule 3     atorvastatin (LIPITOR) 40 MG tablet Take 1 tablet (40 mg) by mouth daily 90 tablet 1     hydrALAZINE (APRESOLINE) 25 MG tablet Take 1 tablet (25 mg) by mouth 2 times daily (Patient taking differently: Take 50 mg by mouth daily ) 60 tablet 1     oxyCODONE IR (ROXICODONE) 10 MG tablet Take 1 tablet (10 mg) by mouth every 8 hours as needed 90 tablet 0     furosemide (LASIX) 40 MG tablet Take 1 tablet (40 mg) by mouth 2 times daily (Patient taking differently: Take 80 mg by mouth daily ) 60 tablet 3     sodium bicarbonate 325 MG tablet Take 2 tablets (650 mg) by mouth 3 times daily 180 tablet 3     nitroGLYcerin (NITROSTAT) 0.4 MG sublingual tablet Place 1 tablet (0.4 mg) under the tongue every 5 minutes as needed for chest pain 25 tablet 0     docusate sodium (COLACE) 100 MG capsule Take 1 capsule (100 mg) by mouth 2 times daily 60 capsule 4     amLODIPine (NORVASC) 5 MG tablet Take 2 tablets (10 mg) by mouth daily 60 tablet 3     ferrous sulfate (IRON) 325 (65 FE) MG tablet Take 1 tablet (325 mg) by mouth daily (with breakfast) 100 tablet 1     mometasone-formoterol (DULERA) 100-5 MCG/ACT oral inhaler Inhale 2 puffs into the lungs 2 times daily 1 Inhaler 1     albuterol (PROAIR HFA/PROVENTIL HFA/VENTOLIN HFA) 108 (90 BASE) MCG/ACT Inhaler Inhale 2 puffs into the lungs every 6 hours as needed for shortness of breath / dyspnea or wheezing (Patient not taking: Reported on 1/16/2018) 18 g 3     insulin glargine (LANTUS) 100 UNIT/ML injection Inject 10 Units Subcutaneous every morning 15 mL 3     order for DME Equipment being ordered: two pairs moderate knee high support hose 2 Device 1     blood glucose monitoring (FREESTYLE) lancets Test BS four times daily as directed 1 Box 12     blood glucose monitoring (FREESTYLE LITE) test strip TEST BLOOD SUGAR TWO TIMES A DAY 50 strip 12     levothyroxine (SYNTHROID/LEVOTHROID) 200 MCG tablet Take 1 tablet (200 mcg) by mouth See Admin Instructions New daily increase.  Recheck labs in 8 weeks. 90 tablet 1     metoprolol (TOPROL-XL) 25 MG 24 hr tablet Take 1 tablet (25 mg) by mouth daily 90 tablet 1     nystatin (MYCOSTATIN) 777590 UNIT/GM POWD Apply 1 g topically 3 times daily as needed 30 g 1     polyethylene glycol (MIRALAX) powder Take 17 g (1 capful) by mouth daily 510 g 2     Alcohol Swabs (SM ALCOHOL PREP) 70 % PADS Externally apply 1 pad topically 4 times daily 100 each 11     order for DME One wheeled walker with seat and brakes and basket 1 Device 0     Cholecalciferol (VITAMIN D) 2000 UNITS tablet Take 2,000 Units by mouth daily (Patient not taking: Reported on 1/16/2018) 60 tablet 2     insulin pen needle 31G X 6 MM Use as directed 120 each 3     ASPIRIN LOW DOSE 81 MG EC tablet Take 1 tablet (81 mg) by mouth daily 30 tablet 11     Emollient (EUCERIN CALMING DAILY MOIST) CREA Externally apply 1 dose * topically daily 1 Tube 12     Allergies   Allergen Reactions     Contrast Dye Hives and Itching     Clonidine      She had as IP and thinks it made her itchy     Diatrizoate Other (See Comments)     Diltiazem      Severe bradycardia     Hydralazine      [place] tab patient thought made her itchy so stopped     Iodine-131      Recent Labs   Lab Test  01/16/18   1055  01/08/18   1335   10/25/17   0639  10/20/17   1626   09/07/17   1135  05/10/17   1025   11/02/16   0622   A1C  6.0   --    --   6.6*   --    --   6.4*  6.6*   < >   --    LDL   --    --    --   104*   --    --   81  96   --    --    HDL   --    --    --   67   --    --   75  66   --    --    TRIG   --    --    --   212*   --    --   283*  303*   --    --    ALT   --    --    --   16   --    --   18   --    --   21   CR  3.34*  3.08*   < >  2.77*   --    < >  2.70*  2.25*   < >  2.64*   GFRESTIMATED  14*  15*   < >  17*   --    < >  17*  21*   < >  18*   GFRESTBLACK  16*  18*   < >  20*   --    < >  21*  26*   < >  22*   POTASSIUM  4.8  5.4*   < >  4.3   --    < >  6.2*  5.5*   < >  5.6*   TSH   --    --    --     --   1.04   --   71.77*   --    < >   --     < > = values in this interval not displayed.      BP Readings from Last 3 Encounters:   01/16/18 144/74   01/15/18 130/56   01/08/18 128/60    Wt Readings from Last 3 Encounters:   01/15/18 175 lb (79.4 kg)   12/15/17 176 lb 8 oz (80.1 kg)   10/25/17 177 lb 9.6 oz (80.6 kg)        Labs reviewed in EPIC  Problem list, Medication list, Allergies, and Medical/Social/Surgical histories reviewed in [facility] and updated as appropriate.     ROS: Constitutional, neuro, ENT, endocrine, pulmonary, cardiac, gastrointestinal, genitourinary, musculoskeletal, integument and psychiatric systems are negative, except as otherwise noted above in the HPI.   OBJECTIVE:                                                    BP (!) 84/52  Pulse 68  Temp 98.6  F (37  C) (Oral)  Resp 18  Wt 170 lb (77.1 kg)  SpO2 96%  BMI 28.29 kg/m2  Body mass index is 28.29 kg/(m^2).  GENERAL: healthy, alert, well nourished, well hydrated, no distress  EYES: Eyes grossly normal to inspection, extraocular movements - intact, and PERRL  HENT: ear canals- normal; TMs- normal; Nose- normal; Mouth- no ulcers, no lesions  NECK: no tenderness, no adenopathy, no asymmetry, no masses, no stiffness; thyroid- normal to palpation  RESP: lungs clear to auscultation - no rales, no rhonchi, no wheezes  CV: regular rates and rhythm, normal S1 S2, no S3 or S4 and no murmur, no click or rub - no homans or cords  MS: extremities- no gross deformities noted, no edema  NEURO: strength and tone- normal, sensory exam- grossly normal, mentation- intact, speech- normal, reflexes- symmetric  Non focal no aphasia. No facial asymmetry.   LYMPHATICS: ant. cervical- normal, post. cervical- normal,  supraclavicular- normal.  Mental Status Assessment:  Appearance:   Appropriate   Eye Contact:   Good   Psychomotor Behavior: Normal   Attitude:   Cooperative   Orientation:   All  Speech   Rate / Production: Normal    Volume:  Normal    Mood:    Normal  Affect:    Appropriate   Thought Content:  Clear   Thought Form:  Coherent  Logical   Insight:    Good   Diagnostic Test Results:  Results for orders placed or performed in visit on 01/26/18 (from the past 24 hour(s))   XR Chest 2 Views    Narrative    Exam:  Chest X-ray 1/26/2018 10:35 AM    History: ; Cough    Comparison: 10/25/2017    Findings: PA and lateral chest radiographs are obtained. Stable  position of left subclavian stent. Surgical clips projecting over the  left upper quadrant. The cardiomediastinal silhouette and pulmonary  vasculature are within normal limits. The lungs are hyperinflated with  upper and middle lobe predominant bronchiectasis and bronchial wall  thickening, consistent with emphysema. No pleural effusion or  pneumothorax. No focal pulmonary opacities. The upper abdomen is  unremarkable.      Impression    Impression:   Emphysematous changes of the lungs without focal pulmonary opacity.    I have personally reviewed the examination and initial interpretation  and I agree with the findings.    WILL CAMILO MD     *Note: Due to a large number of results and/or encounters for the requested time period, some results have not been displayed. A complete set of results can be found in Results Review.        ASSESSMENT/PLAN:                                                    (R05) Cough  (primary encounter diagnosis)  Comment: Noted above. Concern for infection with patient having a history of smoking and COPD.   Plan: XR Chest 2 Views, azithromycin (ZITHROMAX) 250         MG tablet        -Discussed antibiotic use for the next 5 days.     (R06.02) Shortness of breath  Comment: Patient wheezing and short of breath during the exam. Patient denies using a neb prior to clinic visit.   Plan: albuterol (2.5 MG/3ML) 0.083% neb solution        -Patient moving air better after the nebulizer.   -Encouraged patient to use her albuterol rescue inhaler to help with her shortness of  breath.     (J00) Acute nasopharyngitis  Comment: Noted above.   Plan: fluticasone (FLONASE) 50 MCG/ACT spray        -encouraged patient to use Flonase daily while symptoms persist.     (J44.9) Chronic obstructive pulmonary disease, unspecified COPD type (H)  Comment: Patient with a history of COPD and smoking. CXR to rule out infection vs other etiology.   Plan: XR Chest 2 Views, albuterol (2.5 MG/3ML) 0.083%        neb solution        -No infectious process noted in the CXR.   -Patient to complete 5 day course of azithromycin.     Patient Instructions   -Chest X-ray today.   -Flonase and Azithromycin for respiratory illness.   -Call clinic with any questions or concerns.     I spent 25 min spent in direct face to face time with Kim Anne, greater than 50% in counseling and coordination of care for: Cough symptoms, Dyspnea and management.      JUNIOR Lopez [facility]

## 2018-01-26 NOTE — NURSING NOTE
"Chief Complaint   Patient presents with     URI       Initial BP (!) 84/52  Pulse 68  Temp 98.6  F (37  C) (Oral)  Resp 18  Wt 170 lb (77.1 kg)  SpO2 96%  BMI 28.29 kg/m2 Estimated body mass index is 28.29 kg/(m^2) as calculated from the following:    Height as of 12/15/17: 5' 5\" (1.651 m).    Weight as of this encounter: 170 lb (77.1 kg).  Medication Reconciliation: complete     Gerson Flower, GRIS    "

## 2018-01-26 NOTE — PATIENT INSTRUCTIONS
-Chest X-ray today.   -Flonase and Azithromycin for respiratory illness.   -Call clinic with any questions or concerns.

## 2018-01-26 NOTE — NURSING NOTE
The following nebulizer treatment was given:     MEDICATION: Albuterol Sulfate 2.5 mg  : [employer]  LOT #: [id number]  EXPIRATION DATE:  5/19  NDC # 9161-1700-86

## 2018-01-29 ENCOUNTER — PRE VISIT (OUTPATIENT)
Dept: CARDIOLOGY | Facility: CLINIC | Age: 73
End: 2018-01-29

## 2018-01-29 DIAGNOSIS — I42.2 HYPERTROPHIC CARDIOMYOPATHY (H): Primary | Chronic | ICD-10-CM

## 2018-01-30 ENCOUNTER — TELEPHONE (OUTPATIENT)
Dept: FAMILY MEDICINE | Facility: CLINIC | Age: 73
End: 2018-01-30

## 2018-01-30 DIAGNOSIS — M54.50 CHRONIC LOW BACK PAIN WITHOUT SCIATICA, UNSPECIFIED BACK PAIN LATERALITY: ICD-10-CM

## 2018-01-30 DIAGNOSIS — G89.29 CHRONIC LOW BACK PAIN WITHOUT SCIATICA, UNSPECIFIED BACK PAIN LATERALITY: ICD-10-CM

## 2018-01-30 RX ORDER — OXYCODONE HYDROCHLORIDE 10 MG/1
10 TABLET ORAL EVERY 8 HOURS PRN
Qty: 90 TABLET | Refills: 0 | Status: SHIPPED | OUTPATIENT
Start: 2018-01-30 | End: 2018-03-01

## 2018-01-30 NOTE — TELEPHONE ENCOUNTER
Routing refill request to provider for review/approval because:  Drug not on the [facility] refill protocol     Angelina Hollis RN January 30, 2018 3:38 PM

## 2018-01-30 NOTE — TELEPHONE ENCOUNTER
Last Written Prescription Date:  12/30/17  Last Fill Quantity: 90,  # refills: 0   Last Office Visit with [facility] provider:  01/26/18   Future Office Visit:    Next 5 appointments (look out 90 days)     Feb 12, 2018 11:30 AM CST   [facility] with LAMINE Chahal   [facility]    [address]  [address]  [facility] [address]   [phone]            Feb 12, 2018 11:30 AM CST   Return Visit with JUNIOR Bishop CNP   [facility] ([facility])    [address]  [address]  [facility] [address]   [phone]            Feb 16, 2018 11:30 AM CST   (Arrive by 11:15 AM)   SHORT with eDnton Yuen RPH   [facility] ([facility])    [address] [facility] [address]  [address] [facility] [address] [phone]                Requested Prescriptions   Pending Prescriptions Disp Refills     oxyCODONE IR (ROXICODONE) 10 MG tablet 90 tablet 0     Sig: Take 1 tablet (10 mg) by mouth every 8 hours as needed    There is no refill protocol information for this order

## 2018-01-31 NOTE — TELEPHONE ENCOUNTER
Called pt to inform script was available to  in clinic, no answer, left VM requesting call back.  Helga Marquis RN

## 2018-02-09 ENCOUNTER — CARE COORDINATION (OUTPATIENT)
Dept: GERIATRIC MEDICINE | Facility: CLINIC | Age: 73
End: 2018-02-09

## 2018-02-09 DIAGNOSIS — N18.4 CKD (CHRONIC KIDNEY DISEASE) STAGE 4, GFR 15-29 ML/MIN (H): Primary | ICD-10-CM

## 2018-02-09 NOTE — PROGRESS NOTES
Arranged transportation thru [facility] for the below appt:  Appt Date & Time: 2/12 @ 11:30 am  Clinic Name & Address:  [facility] [address] [place] [address], [address], [place] [address]  Transportation Provider: [employer]   time:  10:45 am    Arranged transportation thru [facility] for the below appt:  Appt Date & Time: 2/16 @ 10:00 am  Clinic Name & Address:  [place] [id number]  Transportation Provider: [employer]   time:  9:30 am    Arranged transportation thru [facility] for the below appt:  Appt Date & Time: 2/27 @ 5:45 pm  Clinic Name & Address:  [place] [id number]  Transportation Provider: [employer]   time:  5:15 pm    Notified Kim of  time.    Brianna Barrios  Case Management Specialist  [facility]   [phone]

## 2018-02-12 ENCOUNTER — OFFICE VISIT (OUTPATIENT)
Dept: BEHAVIORAL HEALTH | Facility: CLINIC | Age: 73
End: 2018-02-12
Payer: COMMERCIAL

## 2018-02-12 ENCOUNTER — OFFICE VISIT (OUTPATIENT)
Dept: FAMILY MEDICINE | Facility: CLINIC | Age: 73
End: 2018-02-12
Payer: COMMERCIAL

## 2018-02-12 VITALS
HEART RATE: 63 BPM | TEMPERATURE: 98.7 F | OXYGEN SATURATION: 98 % | RESPIRATION RATE: 16 BRPM | BODY MASS INDEX: 27.64 KG/M2 | SYSTOLIC BLOOD PRESSURE: 120 MMHG | HEIGHT: 66 IN | WEIGHT: 172 LBS | DIASTOLIC BLOOD PRESSURE: 60 MMHG

## 2018-02-12 DIAGNOSIS — F43.21 GRIEF: ICD-10-CM

## 2018-02-12 DIAGNOSIS — N18.4 TYPE 2 DIABETES MELLITUS WITH STAGE 4 CHRONIC KIDNEY DISEASE, WITH LONG-TERM CURRENT USE OF INSULIN (H): ICD-10-CM

## 2018-02-12 DIAGNOSIS — Z79.4 TYPE 2 DIABETES MELLITUS WITH STAGE 4 CHRONIC KIDNEY DISEASE, WITH LONG-TERM CURRENT USE OF INSULIN (H): ICD-10-CM

## 2018-02-12 DIAGNOSIS — Z12.4 CERVICAL CANCER SCREENING: ICD-10-CM

## 2018-02-12 DIAGNOSIS — Z12.11 COLON CANCER SCREENING: ICD-10-CM

## 2018-02-12 DIAGNOSIS — Z12.31 ENCOUNTER FOR SCREENING MAMMOGRAM FOR BREAST CANCER: Primary | ICD-10-CM

## 2018-02-12 DIAGNOSIS — F43.21 ADJUSTMENT DISORDER WITH DEPRESSED MOOD: Primary | ICD-10-CM

## 2018-02-12 DIAGNOSIS — Z76.82 AWAITING ORGAN TRANSPLANT STATUS: ICD-10-CM

## 2018-02-12 DIAGNOSIS — E11.22 TYPE 2 DIABETES MELLITUS WITH STAGE 4 CHRONIC KIDNEY DISEASE, WITH LONG-TERM CURRENT USE OF INSULIN (H): ICD-10-CM

## 2018-02-12 PROCEDURE — G0476 HPV COMBO ASSAY CA SCREEN: HCPCS | Performed by: NURSE PRACTITIONER

## 2018-02-12 PROCEDURE — G0145 SCR C/V CYTO,THINLAYER,RESCR: HCPCS | Performed by: NURSE PRACTITIONER

## 2018-02-12 PROCEDURE — 99214 OFFICE O/P EST MOD 30 MIN: CPT | Performed by: NURSE PRACTITIONER

## 2018-02-12 PROCEDURE — 90832 PSYTX W PT 30 MINUTES: CPT | Performed by: SOCIAL WORKER

## 2018-02-12 ASSESSMENT — PAIN SCALES - GENERAL: PAINLEVEL: MODERATE PAIN (4)

## 2018-02-12 NOTE — PROGRESS NOTES
SUBJECTIVE:                                                    Kim Anne is a 72 year old  female who presents to clinic today for the following health issues:  [facility]: Juan Lopez (Intern)    Diabetes Follow-up    Patient is checking blood sugars: 0-2    Diabetic concerns: None     Symptoms of hypoglycemia (low blood sugar): none     Paresthesias (numbness or burning in feet) or sores: No   Date of last diabetic eye exam: Current. Patient is due for an eye exam in May.    Hyperlipidemia Follow-Up      Rate your low fat/cholesterol diet?: good    Taking statin?  Yes, no muscle aches from statin    Other lipid medications/supplements?:  none    Hypertension Follow-up  Patient denies any headaches, chest pain, shortness of breath or syncopal episodes.     Outpatient blood pressures are not being checked.    Low Salt Diet: no added salt    BP Readings from Last 2 Encounters:   01/26/18 (!) 84/52   01/16/18 144/74     Hemoglobin A1C (%)   Date Value   01/16/2018 6.0   10/25/2017 6.6 (H)     LDL Cholesterol Calculated (mg/dL)   Date Value   10/25/2017 104 (H)   09/07/2017 81       Amount of exercise or physical activity: None    Problems taking medications regularly: No    Medication side effects: none    Diet: low potassium.       Mental Health Follow up   Patient reports that her mood has been good. Patient states that she has her kids and grand kids around her most the time and they keep her busy and entertained. States that she will go to her room when she needs some alone time.     Status since last visit: Improving.     See PHQ-9 for current symptoms.  Other associated symptoms: None    Complicating factors: increased doctor appointments.   Significant life event:  No   Current substance abuse:  None  Anxiety or Panic symptoms:  No    Sleep - good, with nocturia x1.   Appetite - good, reports that she has a big appetite and having trouble with the recommended low potassium diet. Patient  states that she does not make the meals; family typically cook for her and have been helpful with focusing on the low potassium.   Exercise - none at this time due to the cold and icy conditions. Walks in the hallways and some stairs.     Smoking - down to 3 cigarettes per day. Using hard candy.   Alcohol - no  Street drugs - no  Marijuana - no  Caffeine - coffee.     PHQ-9  English PHQ-9   Any Language               Problems taking medications regularly: No    Medication side effects: none    Diet: low potassium.     Social History   Substance Use Topics     Smoking status: Current Every Day Smoker     Packs/day: 0.80     Years: 40.00     Types: Cigarettes     Last attempt to quit: 10/31/2016     Smokeless tobacco: Never Used     Alcohol use No        Problem list and histories reviewed & adjusted, as indicated.  Additional history: as documented    Patient Active Problem List   Diagnosis     Hyperlipidemia LDL goal <100     ARAUZ (dyspnea on exertion)     COPD (chronic obstructive pulmonary disease) (H)     Edema     Fatigue     History of MI (myocardial infarction)     Snores     Knee pain, left     Bunion of great toe of left foot     Dystrophic nail     Arthritis     Peripheral vascular disease (H)     Chronic low back pain     Health Care Home     CKD (chronic kidney disease) stage 4, GFR 15-29 ml/min (H)     Abnormal gait     Advance Care Planning     Vitamin D deficiency     Constipation     Hypertension goal BP (blood pressure) < 130/80     Bradycardia     Callus of foot     Other chronic pain     Cataracts, bilateral     Hyperopic astigmatism of both eyes     Presbyopia     Asymptomatic bunion, right     Acquired hypothyroidism     Type 2 diabetes mellitus with diabetic chronic kidney disease (H)     Hypertension associated with diabetes (H)     Hyperlipidemia associated with type 2 diabetes mellitus (H)     Obesity (BMI 30-39.9)     History of sick sinus syndrome     Nephrotic syndrome     Pneumonia      Grief     Cigarette nicotine dependence without complication     HCOM with LVOT     Hyperkalemia     Type 2 diabetes, HbA1C goal < 8% (H)     Smoker     Single kidney     Hypothyroid     Hypertension goal BP (blood pressure) < 140/90     Hyperlipidemia     Diabetic nephropathy (H)     Hypoalbuminemia     Past Surgical History:   Procedure Laterality Date     APPENDECTOMY       BLEPHAROPLASTY BILATERAL  2013    Procedure: BLEPHAROPLASTY BILATERAL;  BILATERAL UPPER EYELID BLEPHAROPLASTY ;  Surgeon: Olayinka Lyon MD;  Location: [facility]     CATARACT IOL, RT/LT Bilateral      CHOLECYSTECTOMY       COLONOSCOPY  7/15/2011    polyps repeat in 5 years     elected term pregnancy       HYSTEROSCOPIC PLACEMENT CONTRACEPTIVE DEVICE       KIDNEY SURGERY      donated left kideny     OVARY SURGERY      left for cyst benign     subclavian stent  2010    [facility]       Social History   Substance Use Topics     Smoking status: Current Every Day Smoker     Packs/day: 0.80     Years: 40.00     Types: Cigarettes     Last attempt to quit: 10/31/2016     Smokeless tobacco: Never Used     Alcohol use No     Family History   Problem Relation Age of Onset     DIABETES Mother      brother, MGM, sister     KIDNEY DISEASE Brother      X2 DM two      Alcohol/Drug Child      daughter     Asthma No family hx of      C.A.D. No family hx of      Hypertension No family hx of      CEREBROVASCULAR DISEASE No family hx of      Breast Cancer No family hx of      Cancer - colorectal No family hx of      Prostate Cancer No family hx of      Allergies No family hx of      Alzheimer Disease No family hx of      Anesthesia Reaction No family hx of      Arthritis No family hx of      Blood Disease No family hx of      CANCER No family hx of      Cardiovascular No family hx of      Circulatory No family hx of      Congenital Anomalies No family hx of      Connective Tissue Disorder No family hx of      Depression No family hx of       Eye Disorder No family hx of      Genetic Disorder No family hx of      GASTROINTESTINAL DISEASE No family hx of      Genitourinary Problems No family hx of      Gynecology No family hx of      HEART DISEASE No family hx of      Lipids No family hx of      Musculoskeletal Disorder No family hx of      Neurologic Disorder No family hx of      Obesity No family hx of      OSTEOPOROSIS No family hx of      Psychotic Disorder No family hx of      Respiratory No family hx of      Thyroid Disease No family hx of      Glaucoma No family hx of      Macular Degeneration No family hx of            Current Outpatient Prescriptions   Medication Sig Dispense Refill     oxyCODONE IR (ROXICODONE) 10 MG tablet Take 1 tablet (10 mg) by mouth every 8 hours as needed 90 tablet 0     azithromycin (ZITHROMAX) 250 MG tablet Two tablets first day, then one tablet daily for four days. 6 tablet 0     fluticasone (FLONASE) 50 MCG/ACT spray Spray 1-2 sprays into both nostrils daily 1 Bottle 1     nicotine polacrilex (COMMIT) 2 MG lozenge Dissolve 1 lozenge orally every 4 hours as directed. 100 lozenge 2     order for DME Equipment being ordered: Compression socks.  Strength:15-20 mmHg 2 each 0     tiotropium (SPIRIVA HANDIHALER) 18 MCG capsule Inhale contents of one capsule daily. 90 capsule 3     atorvastatin (LIPITOR) 40 MG tablet Take 1 tablet (40 mg) by mouth daily 90 tablet 1     hydrALAZINE (APRESOLINE) 25 MG tablet Take 1 tablet (25 mg) by mouth 2 times daily (Patient taking differently: Take 50 mg by mouth daily ) 60 tablet 1     furosemide (LASIX) 40 MG tablet Take 1 tablet (40 mg) by mouth 2 times daily (Patient taking differently: Take 80 mg by mouth daily ) 60 tablet 3     sodium bicarbonate 325 MG tablet Take 2 tablets (650 mg) by mouth 3 times daily 180 tablet 3     nitroGLYcerin (NITROSTAT) 0.4 MG sublingual tablet Place 1 tablet (0.4 mg) under the tongue every 5 minutes as needed for chest pain 25 tablet 0     docusate  sodium (COLACE) 100 MG capsule Take 1 capsule (100 mg) by mouth 2 times daily 60 capsule 4     amLODIPine (NORVASC) 5 MG tablet Take 2 tablets (10 mg) by mouth daily 60 tablet 3     ferrous sulfate (IRON) 325 (65 FE) MG tablet Take 1 tablet (325 mg) by mouth daily (with breakfast) 100 tablet 1     mometasone-formoterol (DULERA) 100-5 MCG/ACT oral inhaler Inhale 2 puffs into the lungs 2 times daily 1 Inhaler 1     albuterol (PROAIR HFA/PROVENTIL HFA/VENTOLIN HFA) 108 (90 BASE) MCG/ACT Inhaler Inhale 2 puffs into the lungs every 6 hours as needed for shortness of breath / dyspnea or wheezing 18 g 3     insulin glargine (LANTUS) 100 UNIT/ML injection Inject 10 Units Subcutaneous every morning 15 mL 3     order for DME Equipment being ordered: two pairs moderate knee high support hose 2 Device 1     blood glucose monitoring (FREESTYLE) lancets Test BS four times daily as directed 1 Box 12     blood glucose monitoring (FREESTYLE LITE) test strip TEST BLOOD SUGAR TWO TIMES A DAY 50 strip 12     levothyroxine (SYNTHROID/LEVOTHROID) 200 MCG tablet Take 1 tablet (200 mcg) by mouth See Admin Instructions New daily increase. Recheck labs in 8 weeks. 90 tablet 1     metoprolol (TOPROL-XL) 25 MG 24 hr tablet Take 1 tablet (25 mg) by mouth daily 90 tablet 1     nystatin (MYCOSTATIN) 907234 UNIT/GM POWD Apply 1 g topically 3 times daily as needed 30 g 1     polyethylene glycol (MIRALAX) powder Take 17 g (1 capful) by mouth daily 510 g 2     Alcohol Swabs (SM ALCOHOL PREP) 70 % PADS Externally apply 1 pad topically 4 times daily 100 each 11     order for DME One wheeled walker with seat and brakes and basket 1 Device 0     Cholecalciferol (VITAMIN D) 2000 UNITS tablet Take 2,000 Units by mouth daily 60 tablet 2     insulin pen needle 31G X 6 MM Use as directed 120 each 3     ASPIRIN LOW DOSE 81 MG EC tablet Take 1 tablet (81 mg) by mouth daily 30 tablet 11     Emollient (EUCERIN CALMING DAILY MOIST) CREA Externally apply 1 dose *  "topically daily 1 Tube 12     Allergies   Allergen Reactions     Contrast Dye Hives and Itching     Clonidine      She had as IP and thinks it made her itchy     Diatrizoate Other (See Comments)     Diltiazem      Severe bradycardia     Hydralazine      [place] tab patient thought made her itchy so stopped     Iodine-131      Recent Labs   Lab Test  01/16/18   1055  01/08/18   1335   10/25/17   0639  10/20/17   1626   09/07/17   1135  05/10/17   1025   11/02/16   0622   A1C  6.0   --    --   6.6*   --    --   6.4*  6.6*   < >   --    LDL   --    --    --   104*   --    --   81  96   --    --    HDL   --    --    --   67   --    --   75  66   --    --    TRIG   --    --    --   212*   --    --   283*  303*   --    --    ALT   --    --    --   16   --    --   18   --    --   21   CR  3.34*  3.08*   < >  2.77*   --    < >  2.70*  2.25*   < >  2.64*   GFRESTIMATED  14*  15*   < >  17*   --    < >  17*  21*   < >  18*   GFRESTBLACK  16*  18*   < >  20*   --    < >  21*  26*   < >  22*   POTASSIUM  4.8  5.4*   < >  4.3   --    < >  6.2*  5.5*   < >  5.6*   TSH   --    --    --    --   1.04   --   71.77*   --    < >   --     < > = values in this interval not displayed.      BP Readings from Last 3 Encounters:   01/26/18 (!) 84/52   01/16/18 144/74   01/15/18 130/56    Wt Readings from Last 3 Encounters:   01/26/18 170 lb (77.1 kg)   01/15/18 175 lb (79.4 kg)   12/15/17 176 lb 8 oz (80.1 kg)        Labs reviewed in EPIC  Problem list, Medication list, Allergies, and Medical/Social/Surgical histories reviewed in [facility] and updated as appropriate.     ROS: Constitutional, neuro, ENT, endocrine, pulmonary, cardiac, gastrointestinal, genitourinary, musculoskeletal, integument and psychiatric systems are negative, except as otherwise noted above in the HPI.   OBJECTIVE:                                                    /60  Pulse 63  Temp 98.7  F (37.1  C) (Oral)  Resp 16  Ht 5' 6\" (1.676 m)  Wt 172 lb (78 kg)  SpO2 " 98%  BMI 27.76 kg/m2  Body mass index is 27.76 kg/(m^2).  GENERAL: healthy, alert, well nourished, well hydrated, no distress  EYES: Eyes grossly normal to inspection, extraocular movements - intact, and PERRL  RESP: lungs clear with expiratory wheezes to auscultation.  CV: regular rates and rhythm, normal S1 S2, no S3 or S4 and no murmur, no click or rub - no homans or cords  ABDOMEN: soft, no tenderness, normal bowel sounds  MS: extremities- no gross deformities noted, no edema  SKIN: no suspicious lesions, no rashes  NEURO: strength and tone- normal, sensory exam- grossly normal, mentation- intact, speech- normal, Non focal no aphasia. No facial asymmetry.   Mental Status Assessment:  Appearance:   Appropriate   Eye Contact:   Good   Psychomotor Behavior: Normal   Attitude:   Cooperative   Orientation:   All  Speech   Rate / Production: Normal    Volume:  Normal   Mood:    Normal  Affect:    Appropriate   Thought Content:  Clear   Thought Form:  Coherent  Logical   Insight:    Good   Attention Span and Concentration: appropriate  Recent and Remote Memory:  intact  Fund of Knowledge: appropriate  Muscle Strength and Tone: normal   Suicidal Ideation: reports no thoughts, no intention  Hallucination: No  Paranoid-No  Manic-No  Panic-No  Self harm-No      See [facility] notes     Diagnostic Test Results:  Pending.      ASSESSMENT/PLAN:                                                    (Z12.31) Encounter for screening mammogram for breast cancer  (primary encounter diagnosis)  Comment: Routine screening, also recommended by solid organ transplant.   Plan: *MA Screening Digital Bilateral        Patient will be helped to schedule her appointment.    (Z12.11) Colon cancer screening  Comment: Routine screening.   Plan: GASTROENTEROLOGY ADULT REF PROCEDURE ONLY        Recommendation per transplant team.   -Patient will be helped to schedule appointment.     (Z12.4) Cervical cancer screening  Comment: Per transplant  recommendation.   Plan: Pap imaged thin layer screen with HPV -         recommended age 30 - 65 years (select HPV order        below), HPV High Risk Types DNA Cervical        Letter will be sent out to patient with results.     (E11.22,  N18.4,  Z79.4) Type 2 diabetes mellitus with stage 4 chronic kidney disease, with long-term current use of insulin (H)  Comment: Noted above.   Lab Results   Component Value Date    A1C 6.0 01/16/2018    A1C 6.6 10/25/2017    A1C 6.4 09/07/2017    A1C 6.6 05/10/2017    A1C 6.3 12/13/2016       Plan: No changes with medications; continue with daily Lantus.     Patient Instructions   -Colonoscopy and Mammogram set-up in place.   -Will send you a letter with the pap results.     I spent 25 min spent in direct face to face time with Kim Anne, greater than 50% in counseling and coordination of care for:  Screenings for transplant, diabetes and depression/anxiety.     JUNIOR Lopez [facility] PRIMARY CARE

## 2018-02-12 NOTE — MR AVS SNAPSHOT
After Visit Summary   2/12/2018    Kim Anne    MRN: [id number]           Patient Information     Date Of Birth          1945        Visit Information        Provider Department      2/12/2018 11:30 AM Reji Lopez [facility]        Today's Diagnoses     Adjustment disorder with depressed mood    -  1    Grief           Follow-ups after your visit        Your next 10 appointments already scheduled     Feb 20, 2018 10:30 AM CST   (Arrive by 10:15 AM)   MA SCREENING DIGITAL BILATERAL with URBCMA1   [facility] ([facility])    [address] [place] [address], [address]  [facility] [id number] [phone]           Do not use any powder, lotion or deodorant under your arms or on your breast. If you do, we will ask you to remove it before your exam.  Wear comfortable, two-piece clothing.  If you have any allergies, tell your care team.  Bring any previous mammograms from other facilities or have them mailed to the breast center.            Feb 27, 2018  6:00 PM CST   (Arrive by 5:45 PM)   NEW PANCREAS/KIDNEY TRANSPLANT WORK-UP with Quincy Griffin MD   [facility] ([facility])    [address]  [address]  [facility] [id number] [phone]            Mar 02, 2018 10:00 AM CST   Lab with  LAB   [facility] ([facility])    [address]  1st Floor  [facility] [id number] [phone]            Mar 02, 2018 11:00 AM CST   (Arrive by 10:30 AM)   Return Visit with Wendy Espinal PA-C   [facility] ([facility])    [address]  [address]  [facility] [id number] [phone]            Mar 05, 2018 10:30 AM CST   (Arrive by 10:15 AM)   SHORT with Denton Yuen RPH   [facility] ([facility])    [address] [place] [address]  3rd Floor  [facility]  [address]   [phone]            Mar 12, 2018 10:00 AM CDT   Return Visit with JUNIOR Bishop CNP   [facility] ([facility]    [address]  [address]  [facility] [address]   [phone]            Mar 12, 2018 10:00 AM CDT   Return Visit with LAMINE Chahal   [facility] ([facility]    [address]  [address]  [facility] [address] [phone]            Mar 29, 2018  7:30 AM CDT   PFT VISIT with  PFL B   [facility] ([facility])    [address]  3rd Floor  [facility] [address] [phone]            Mar 29, 2018  8:00 AM CDT   (Arrive by 7:45 AM)   Return Visit with Margret Packer MD   [facility] ([facility])    [address]  [address]  [facility] [address] [phone]              Who to contact     If you have questions or need follow up information about today's clinic visit or your schedule please contact [facility] directly at [phone].  Normal or non-critical lab and imaging results will be communicated to you by [email or web address], letter or phone within 4 business days after the clinic has received the results. If you do not hear from us within 7 days, please contact the clinic through MyChart or phone. If you have a critical or abnormal lab result, we will notify you by phone as soon as possible.  Submit refill requests through ShopPad or call your pharmacy and they will forward the refill request to us. Please allow 3 business days for your refill to be completed.          Additional Information About Your Visit        ShopPad Information     ShopPad lets you send messages to your doctor, view your test results, renew your prescriptions, schedule appointments and more. To sign up, go  "to [email or web address] . Click on \"Log in\" on the left side of the screen, which will take you to the Welcome page. Then click on \"Sign up Now\" on the right side of the page.     You will be asked to enter the access code listed below, as well as some personal information. Please follow the directions to create your username and password.     Your access code is: [email or web address]  Expires: 3/1/2018  6:30 AM     Your access code will  in 90 days. If you need help or a new code, please call your [place] clinic or [phone].        Care EveryWhere ID     This is your Care EveryWhere ID. This could be used by other organizations to access your [place] medical records  [id number]         Blood Pressure from Last 3 Encounters:   18 182/73   18 120/60   18 (!) 84/52    Weight from Last 3 Encounters:   18 168 lb 9.6 oz (76.5 kg)   18 172 lb (78 kg)   18 170 lb (77.1 kg)              Today, you had the following     No orders found for display         Today's Medication Changes          These changes are accurate as of 18 11:59 PM.  If you have any questions, ask your nurse or doctor.               These medicines have changed or have updated prescriptions.        Dose/Directions    furosemide 40 MG tablet   Commonly known as:  LASIX   This may have changed:    - how much to take  - when to take this   Used for:  Hyperkalemia, Edema, unspecified type        Dose:  40 mg   Take 1 tablet (40 mg) by mouth 2 times daily   Quantity:  60 tablet   Refills:  3       hydrALAZINE 25 MG tablet   Commonly known as:  APRESOLINE   This may have changed:    - how much to take  - when to take this   Used for:  HTN (hypertension), Chronic kidney disease, stage 4 (severe) (H)        Dose:  25 mg   Take 1 tablet (25 mg) by mouth 2 times daily   Quantity:  60 tablet   Refills:  1                Primary Care Provider Office Phone # Fax #    JUNIOR Bishop [phone] "       [address] [facility] [address]  [facility] [address]        Equal Access to Services     ARPITADIMPLE UNRULY : Hadii alysha redd changjanet Sofranciscaali, waaxda luqadaha, qaybta katannafahad minroyafahad, kassandra cary giselledeborah merazconchitaanna white . So [facility] [place] [phone].    ATENCIÓN: Si habla español, tiene a bailey disposición servicios gratuitos de asistencia lingüística. Llame al [phone].    We comply with applicable federal civil rights laws and [place] laws. We do not discriminate on the basis of race, color, national origin, age, disability, sex, sexual orientation, or gender identity.            Thank you!     Thank you for choosing [facility]  for your care. Our goal is always to provide you with excellent care. Hearing back from our patients is one way we can continue to improve our services. Please take a few minutes to complete the written survey that you may receive in the mail after your visit with us. Thank you!             Your Updated Medication List - Protect others around you: Learn how to safely use, store and throw away your medicines at www.disposemymeds.org.          This list is accurate as of 2/12/18 11:59 PM.  Always use your most recent med list.                   Brand Name Dispense Instructions for use Diagnosis    albuterol 108 (90 BASE) MCG/ACT Inhaler    PROAIR HFA/PROVENTIL HFA/VENTOLIN HFA    18 g    Inhale 2 puffs into the lungs every 6 hours as needed for shortness of breath / dyspnea or wheezing    Chronic obstructive pulmonary disease, unspecified COPD type (H)       amLODIPine 5 MG tablet    NORVASC    60 tablet    Take 2 tablets (10 mg) by mouth daily    Renovascular hypertension       ASPIRIN LOW DOSE 81 MG EC tablet   Generic drug:  aspirin     30 tablet    Take 1 tablet (81 mg) by mouth daily    History of MI (myocardial infarction), Essential hypertension, benign       atorvastatin 40 MG tablet    LIPITOR    90 tablet    Take 1 tablet (40 mg) by mouth daily     Hyperlipidemia LDL goal <100       azithromycin 250 MG tablet    ZITHROMAX    6 tablet    Two tablets first day, then one tablet daily for four days.    Cough       blood glucose monitoring lancets     1 Box    Test BS four times daily as directed    Type 2 diabetes mellitus without complication, with long-term current use of insulin (H)       blood glucose monitoring test strip    FREESTYLE LITE    50 strip    TEST BLOOD SUGAR TWO TIMES A DAY    Type 2 diabetes mellitus without complication, with long-term current use of insulin (H)       docusate sodium 100 MG capsule    COLACE    60 capsule    Take 1 capsule (100 mg) by mouth 2 times daily    Constipation, unspecified constipation type       EUCERIN CALMING DAILY MOIST Crea     1 Tube    Externally apply 1 dose * topically daily    Type II or unspecified type diabetes mellitus with neurological manifestations, not stated as uncontrolled(250.60) (H)       ferrous sulfate 325 (65 FE) MG tablet    IRON    100 tablet    Take 1 tablet (325 mg) by mouth daily (with breakfast)    Anemia, iron deficiency       fluticasone 50 MCG/ACT spray    FLONASE    1 Bottle    Spray 1-2 sprays into both nostrils daily    Acute nasopharyngitis       furosemide 40 MG tablet    LASIX    60 tablet    Take 1 tablet (40 mg) by mouth 2 times daily    Hyperkalemia, Edema, unspecified type       hydrALAZINE 25 MG tablet    APRESOLINE    60 tablet    Take 1 tablet (25 mg) by mouth 2 times daily    HTN (hypertension), Chronic kidney disease, stage 4 (severe) (H)       insulin glargine 100 UNIT/ML injection    LANTUS    15 mL    Inject 10 Units Subcutaneous every morning    Controlled type 2 diabetes mellitus with stage 4 chronic kidney disease, with long-term current use of insulin (H)       insulin pen needle 31G X 6 MM     120 each    Use as directed    Type 2 diabetes mellitus without complication (H)       levothyroxine 200 MCG tablet    SYNTHROID/LEVOTHROID    90 tablet    Take 1 tablet  (200 mcg) by mouth See Admin Instructions New daily increase. Recheck labs in 8 weeks.    Other specified hypothyroidism       metoprolol succinate 25 MG 24 hr tablet    TOPROL-XL    90 tablet    Take 1 tablet (25 mg) by mouth daily    Hypertension associated with diabetes (H)       mometasone-formoterol 100-5 MCG/ACT oral inhaler    DULERA    1 Inhaler    Inhale 2 puffs into the lungs 2 times daily    COPD (chronic obstructive pulmonary disease) (H)       nicotine polacrilex 2 MG lozenge    COMMIT    100 lozenge    Dissolve 1 lozenge orally every 4 hours as directed.    Chronic obstructive pulmonary disease, unspecified COPD type (H)       nitroGLYcerin 0.4 MG sublingual tablet    NITROSTAT    25 tablet    Place 1 tablet (0.4 mg) under the tongue every 5 minutes as needed for chest pain    History of MI (myocardial infarction)       nystatin 314576 UNIT/GM Powd    MYCOSTATIN    30 g    Apply 1 g topically 3 times daily as needed    Groin rash       * order for DME     1 Device    One wheeled walker with seat and brakes and basket    Risk for falls       * order for DME     2 Device    Equipment being ordered: two pairs moderate knee high support hose    Edema, unspecified type       * order for DME     2 each    Equipment being ordered: Compression socks. Strength:15-20 mmHg    Localized edema       oxyCODONE IR 10 MG tablet    ROXICODONE    90 tablet    Take 1 tablet (10 mg) by mouth every 8 hours as needed    Chronic low back pain without sciatica, unspecified back pain laterality       polyethylene glycol powder    MIRALAX    510 g    Take 17 g (1 capful) by mouth daily    Slow transit constipation       SM ALCOHOL PREP 70 % Pads     100 each    Externally apply 1 pad topically 4 times daily    Type 2 diabetes mellitus without complication, with long-term current use of insulin (H)       sodium bicarbonate 325 MG tablet     180 tablet    Take 2 tablets (650 mg) by mouth 3 times daily    Acidosis, CKD (chronic  kidney disease) stage 4, GFR 15-29 ml/min (H)       tiotropium 18 MCG capsule    SPIRIVA HANDIHALER    90 capsule    Inhale contents of one capsule daily.    Pulmonary emphysema, unspecified emphysema type (H)       vitamin D 2000 UNITS tablet     60 tablet    Take 2,000 Units by mouth daily    Vitamin D deficiency disease       * Notice:  This list has 3 medication(s) that are the same as other medications prescribed for you. Read the directions carefully, and ask your doctor or other care provider to review them with you.

## 2018-02-12 NOTE — LETTER
My Depression Action Plan  Name: Kim Anne   Date of Birth 1945  Date: 2/12/2018    My doctor: Ailyn Nieves   My clinic: [facility]  [address] [place] [address]  [address]  [facility] [address]  [phone]          GREEN    ZONE   Good Control    What it looks like:     Things are going generally well. You have normal up s and down s. You may even feel depressed from time to time, but bad moods usually last less than a day.   What you need to do:  1. Continue to care for yourself (see self care plan)  2. Check your depression survival kit and update it as needed  3. Follow your physician s recommendations including any medication.  4. Do not stop taking medication unless you consult with your physician first.           YELLOW         ZONE Getting Worse    What it looks like:     Depression is starting to interfere with your life.     It may be hard to get out of bed; you may be starting to isolate yourself from others.    Symptoms of depression are starting to last most all day and this has happened for several days.     You may have suicidal thoughts but they are not constant.   What you need to do:     1. Call your care team, your response to treatment will improve if you keep your care team informed of your progress. Yellow periods are signs an adjustment may need to be made.     2. Continue your self-care, even if you have to fake it!    3. Talk to someone in your support network    4. Open up your depression survival kit           RED    ZONE Medical Alert - Get Help    What it looks like:     Depression is seriously interfering with your life.     You may experience these or other symptoms: You can t get out of bed most days, can t work or engage in other necessary activities, you have trouble taking care of basic hygiene, or basic responsibilities, thoughts of suicide or death that will not go away, self-injurious behavior.     What you need to  do:  1. Call your care team and request a same-day appointment. If they are not available (weekends or after hours) call your local crisis line, emergency room or 911.      Electronically signed by: Anibal Luis, February 12, 2018    Depression Self Care Plan / Survival Kit    Self-Care for Depression  Here s the deal. Your body and mind are really not as separate as most people think.  What you do and think affects how you feel and how you feel influences what you do and think. This means if you do things that people who feel good do, it will help you feel better.  Sometimes this is all it takes.  There is also a place for medication and therapy depending on how severe your depression is, so be sure to consult with your medical provider and/ or Behavioral Health Consultant if your symptoms are worsening or not improving.     In order to better manage my stress, I will:    Exercise  Get some form of exercise, every day. This will help reduce pain and release endorphins, the  feel good  chemicals in your brain. This is almost as good as taking antidepressants!  This is not the same as joining a gym and then never going! (they count on that by the way ) It can be as simple as just going for a walk or doing some gardening, anything that will get you moving.      Hygiene   Maintain good hygiene (Get out of bed in the morning, Make your bed, Brush your teeth, Take a shower, and Get dressed like you were going to work, even if you are unemployed).  If your clothes don't fit try to get ones that do.    Diet  I will strive to eat foods that are good for me, drink plenty of water, and avoid excessive sugar, caffeine, alcohol, and other mood-altering substances.  Some foods that are helpful in depression are: complex carbohydrates, B vitamins, flaxseed, fish or fish oil, fresh fruits and vegetables.    Psychotherapy  I agree to participate in Individual Therapy (if recommended).    Medication  If prescribed  medications, I agree to take them.  Missing doses can result in serious side effects.  I understand that drinking alcohol, or other illicit drug use, may cause potential side effects.  I will not stop my medication abruptly without first discussing it with my provider.    Staying Connected With Others  I will stay in touch with my friends, family members, and my primary care provider/team.    Use your imagination  Be creative.  We all have a creative side; it doesn t matter if it s oil painting, sand castles, or mud pies! This will also kick up the endorphins.    Witness Beauty  (AKA stop and smell the roses) Take a look outside, even in mid-winter. Notice colors, textures. Watch the squirrels and birds.     Service to others  Be of service to others.  There is always someone else in need.  By helping others we can  get out of ourselves  and remember the really important things.  This also provides opportunities for practicing all the other parts of the program.    Humor  Laugh and be silly!  Adjust your TV habits for less news and crime-drama and more comedy.    Control your stress  Try breathing deep, massage therapy, biofeedback, and meditation. Find time to relax each day.     My support system    Clinic Contact:  Phone number:    Contact 1:  Phone number:    Contact 2:  Phone number:    Congregational/:  Phone number:    Therapist:  Phone number:    [facility] crisis center:    Phone number:    Other community support:  Phone number:

## 2018-02-12 NOTE — LETTER
February 20, 2018    Kim Anne  [address] [facility] [address]    Dear Kim,  We are happy to inform you that your PAP smear result from 02/12/18 is normal.  We are now able to do a follow up test on PAP smears. The DNA test is for HPV (Human Papilloma Virus). Cervical cancer is closely linked with certain types of HPV. Your result showed no evidence of high risk HPV.  Therefore we recommend you return in 1 year for your next pap smear.  You will also need to return to the clinic every year for an annual exam and other preventive tests.  Please contact the clinic at [phone] with any questions.  Sincerely,    JUNIOR Lopez CNP/[facility]

## 2018-02-12 NOTE — Clinical Note
Hi,  Can you please help her schedule for a mammogram and colonoscopy.  Thanks!  JUNIOR Ragland CNP

## 2018-02-12 NOTE — MR AVS SNAPSHOT
After Visit Summary   2/12/2018    Kim Anne    MRN: [id number]           Patient Information     Date Of Birth          1945        Visit Information        Provider Department      2/12/2018 11:30 AM Ailyn Nieves APRN CNP [facility]        Today's Diagnoses     Encounter for screening mammogram for breast cancer    -  1    Colon cancer screening        Cervical cancer screening          Care Instructions    -Colonoscopy and Mammogram set-up in place.   -Will send you a letter with the pap results.             Follow-ups after your visit        Additional Services     GASTROENTEROLOGY ADULT REF PROCEDURE ONLY       Last Lab Result: Creatinine (mg/dL)       Date                     Value                 01/16/2018               3.34 (H)         ----------  Body mass index is 27.76 kg/(m^2).     Needed:  No  Language:  English    Patient will be contacted to schedule procedure.     Please be aware that coverage of these services is subject to the terms and limitations of your health insurance plan.  Call member services at your health plan with any benefit or coverage questions.  Any procedures must be performed at a [facility] facility OR coordinated by your clinic's referral office.    Please bring the following with you to your appointment:    (1) Any X-Rays, CTs or MRIs which have been performed.  Contact the facility where they were done to arrange for  prior to your scheduled appointment.    (2) List of current medications   (3) This referral request   (4) Any documents/labs given to you for this referral                  Your next 10 appointments already scheduled     Feb 16, 2018 10:00 AM CST   Lab with  LAB   [facility] ([facility])    [address]  1st [address]  [facility] [address] [phone]            Feb 16, 2018 11:00 AM CST   (Arrive by 10:30 AM)   Return Visit with Wendy  JANE Espinal   [facility] ([facility])    [address]  [address]  [place] [address]   [phone]            Feb 16, 2018 11:30 AM CST   (Arrive by 11:15 AM)   SHORT with Denton Yuen RPH   [facility] ([facility])    [address]  3rd Floor  [place] [address]   [phone]            Feb 27, 2018  6:00 PM CST   (Arrive by 5:45 PM)   NEW PANCREAS/KIDNEY TRANSPLANT WORK-UP with Quincy Griffin MD   [facility] ([facility])    [address]  [address]  [place] [address]   [phone]            Mar 29, 2018  7:30 AM CDT   PFT VISIT with  PFL TAMMI   [facility] Function Testing ([facility])    [address]  3rd Floor  [place] [address]   [phone]            Mar 29, 2018  8:00 AM CDT   (Arrive by 7:45 AM)   Return Visit with Margret Packer MD   [facility] ([facility])    [address]  [address]  [place] [address] [phone]              Future tests that were ordered for you today     Open Future Orders        Priority Expected Expires Ordered    *MA Screening Digital Bilateral Routine  2/12/2019 2/12/2018    GASTROENTEROLOGY ADULT REF PROCEDURE ONLY Routine  2/12/2019 2/12/2018            Who to contact     If you have questions or need follow up information about today's clinic visit or your schedule please contact [facility] directly at [phone].  Normal or non-critical lab and imaging results will be communicated to you by MyChart, letter or phone within 4 business days after the clinic has received the results. If you do not hear from us within 7 days, please contact the clinic through MyChart or phone. If you have a critical or abnormal lab result, we will notify  "you by phone as soon as possible.  Submit refill requests through [employer] or call your pharmacy and they will forward the refill request to us. Please allow 3 business days for your refill to be completed.          Additional Information About Your Visit        Consider[employer]pee Information     [employer] lets you send messages to your doctor, view your test results, renew your prescriptions, schedule appointments and more. To sign up, go to [email or web address] . Click on \"Log in\" on the left side of the screen, which will take you to the Welcome page. Then click on \"Sign up Now\" on the right side of the page.     You will be asked to enter the access code listed below, as well as some personal information. Please follow the directions to create your username and password.     Your access code is: [email or web address]  Expires: 3/1/2018  6:30 AM     Your access code will  in 90 days. If you need help or a new code, please call your [place] clinic or [phone].        Care EveryWhere ID     This is your Care EveryWhere ID. This could be used by other organizations to access your [place] medical records  [id number]        Your Vitals Were     Pulse Temperature Respirations Height Pulse Oximetry BMI (Body Mass Index)    63 98.7  F (37.1  C) (Oral) 16 5' 6\" (1.676 m) 98% 27.76 kg/m2       Blood Pressure from Last 3 Encounters:   18 120/60   18 (!) 84/52   18 144/74    Weight from Last 3 Encounters:   18 172 lb (78 kg)   18 170 lb (77.1 kg)   01/15/18 175 lb (79.4 kg)              We Performed the Following     HPV High Risk Types DNA Cervical     Pap imaged thin layer screen with HPV - recommended age 30 - 65 years (select HPV order below)          Today's Medication Changes          These changes are accurate as of 18 11:35 AM.  If you have any questions, ask your nurse or doctor.               These medicines have changed or have updated prescriptions.        Dose/Directions    " furosemide 40 MG tablet   Commonly known as:  LASIX   This may have changed:    - how much to take  - when to take this   Used for:  Hyperkalemia, Edema, unspecified type        Dose:  40 mg   Take 1 tablet (40 mg) by mouth 2 times daily   Quantity:  60 tablet   Refills:  3       hydrALAZINE 25 MG tablet   Commonly known as:  APRESOLINE   This may have changed:    - how much to take  - when to take this   Used for:  HTN (hypertension), Chronic kidney disease, stage 4 (severe) (H)        Dose:  25 mg   Take 1 tablet (25 mg) by mouth 2 times daily   Quantity:  60 tablet   Refills:  1                Primary Care Provider Office Phone # Fax #    JUNIOR Bishop [phone]       [address] [facility] [address] [place] [address]        Equal Access to Services     CECIL TOLLIVER : Stephany Olmstead, waaxda luqadaha, qaybta kaalmada stephanie, kassandra white . So [facility] [phone].    ATENCIÓN: Si habla español, tiene a bailey disposición servicios gratuitos de asistencia lingüística. Khadijah al [phone].    We comply with applicable federal civil rights laws and [place] laws. We do not discriminate on the basis of race, color, national origin, age, disability, sex, sexual orientation, or gender identity.            Thank you!     Thank you for choosing [facility]  for your care. Our goal is always to provide you with excellent care. Hearing back from our patients is one way we can continue to improve our services. Please take a few minutes to complete the written survey that you may receive in the mail after your visit with us. Thank you!             Your Updated Medication List - Protect others around you: Learn how to safely use, store and throw away your medicines at www.disposemymeds.org.          This list is accurate as of 2/12/18 11:35 AM.  Always use your most recent med list.                   Brand Name Dispense Instructions  for use Diagnosis    albuterol 108 (90 BASE) MCG/ACT Inhaler    PROAIR HFA/PROVENTIL HFA/VENTOLIN HFA    18 g    Inhale 2 puffs into the lungs every 6 hours as needed for shortness of breath / dyspnea or wheezing    Chronic obstructive pulmonary disease, unspecified COPD type (H)       amLODIPine 5 MG tablet    NORVASC    60 tablet    Take 2 tablets (10 mg) by mouth daily    Renovascular hypertension       ASPIRIN LOW DOSE 81 MG EC tablet   Generic drug:  aspirin     30 tablet    Take 1 tablet (81 mg) by mouth daily    History of MI (myocardial infarction), Essential hypertension, benign       atorvastatin 40 MG tablet    LIPITOR    90 tablet    Take 1 tablet (40 mg) by mouth daily    Hyperlipidemia LDL goal <100       azithromycin 250 MG tablet    ZITHROMAX    6 tablet    Two tablets first day, then one tablet daily for four days.    Cough       blood glucose monitoring lancets     1 Box    Test BS four times daily as directed    Type 2 diabetes mellitus without complication, with long-term current use of insulin (H)       blood glucose monitoring test strip    FREESTYLE LITE    50 strip    TEST BLOOD SUGAR TWO TIMES A DAY    Type 2 diabetes mellitus without complication, with long-term current use of insulin (H)       docusate sodium 100 MG capsule    COLACE    60 capsule    Take 1 capsule (100 mg) by mouth 2 times daily    Constipation, unspecified constipation type       EUCERIN CALMING DAILY MOIST Crea     1 Tube    Externally apply 1 dose * topically daily    Type II or unspecified type diabetes mellitus with neurological manifestations, not stated as uncontrolled(250.60) (H)       ferrous sulfate 325 (65 FE) MG tablet    IRON    100 tablet    Take 1 tablet (325 mg) by mouth daily (with breakfast)    Anemia, iron deficiency       fluticasone 50 MCG/ACT spray    FLONASE    1 Bottle    Spray 1-2 sprays into both nostrils daily    Acute nasopharyngitis       furosemide 40 MG tablet    LASIX    60 tablet    Take 1  tablet (40 mg) by mouth 2 times daily    Hyperkalemia, Edema, unspecified type       hydrALAZINE 25 MG tablet    APRESOLINE    60 tablet    Take 1 tablet (25 mg) by mouth 2 times daily    HTN (hypertension), Chronic kidney disease, stage 4 (severe) (H)       insulin glargine 100 UNIT/ML injection    LANTUS    15 mL    Inject 10 Units Subcutaneous every morning    Controlled type 2 diabetes mellitus with stage 4 chronic kidney disease, with long-term current use of insulin (H)       insulin pen needle 31G X 6 MM     120 each    Use as directed    Type 2 diabetes mellitus without complication (H)       levothyroxine 200 MCG tablet    SYNTHROID/LEVOTHROID    90 tablet    Take 1 tablet (200 mcg) by mouth See Admin Instructions New daily increase. Recheck labs in 8 weeks.    Other specified hypothyroidism       metoprolol succinate 25 MG 24 hr tablet    TOPROL-XL    90 tablet    Take 1 tablet (25 mg) by mouth daily    Hypertension associated with diabetes (H)       mometasone-formoterol 100-5 MCG/ACT oral inhaler    DULERA    1 Inhaler    Inhale 2 puffs into the lungs 2 times daily    COPD (chronic obstructive pulmonary disease) (H)       nicotine polacrilex 2 MG lozenge    COMMIT    100 lozenge    Dissolve 1 lozenge orally every 4 hours as directed.    Chronic obstructive pulmonary disease, unspecified COPD type (H)       nitroGLYcerin 0.4 MG sublingual tablet    NITROSTAT    25 tablet    Place 1 tablet (0.4 mg) under the tongue every 5 minutes as needed for chest pain    History of MI (myocardial infarction)       nystatin 164842 UNIT/GM Powd    MYCOSTATIN    30 g    Apply 1 g topically 3 times daily as needed    Groin rash       * order for DME     1 Device    One wheeled walker with seat and brakes and basket    Risk for falls       * order for DME     2 Device    Equipment being ordered: two pairs moderate knee high support hose    Edema, unspecified type       * order for DME     2 each    Equipment being ordered:  Compression socks. Strength:15-20 mmHg    Localized edema       oxyCODONE IR 10 MG tablet    ROXICODONE    90 tablet    Take 1 tablet (10 mg) by mouth every 8 hours as needed    Chronic low back pain without sciatica, unspecified back pain laterality       polyethylene glycol powder    MIRALAX    510 g    Take 17 g (1 capful) by mouth daily    Slow transit constipation       SM ALCOHOL PREP 70 % Pads     100 each    Externally apply 1 pad topically 4 times daily    Type 2 diabetes mellitus without complication, with long-term current use of insulin (H)       sodium bicarbonate 325 MG tablet     180 tablet    Take 2 tablets (650 mg) by mouth 3 times daily    Acidosis, CKD (chronic kidney disease) stage 4, GFR 15-29 ml/min (H)       tiotropium 18 MCG capsule    SPIRIVA HANDIHALER    90 capsule    Inhale contents of one capsule daily.    Pulmonary emphysema, unspecified emphysema type (H)       vitamin D 2000 UNITS tablet     60 tablet    Take 2,000 Units by mouth daily    Vitamin D deficiency disease       * Notice:  This list has 3 medication(s) that are the same as other medications prescribed for you. Read the directions carefully, and ask your doctor or other care provider to review them with you.

## 2018-02-12 NOTE — PROGRESS NOTES
"[facility] - Integrated Primary Care   February 12, 2018      Behavioral Health Clinician Progress Note    Patient Name: Kim Anne           Service Type: Individual      Service Location:   Face to Face in Clinic     Session Start Time: 11:03am  Session End Time: 11:23am      Session Length: 16 - 37      Attendees: Patient and PCP    Visit Activities (Refresh list every visit): [facility] [employer]    Diagnostic Assessment Date: TBD  Treatment Plan Review Date: TBD  See Flowsheets for today's PHQ-9 and JUSTINE-7 results  Previous PHQ-9:   PHQ-9 SCORE 12/12/2016 2/14/2017 2/12/2018   Total Score - - -   Total Score - - -   Total Score 0 1 0     Previous JUSTINE-7:   JUSTINE-7 SCORE 1/13/2016 12/2/2016 12/12/2016   Total Score - - -   Total Score 0 0 0   Total Score - - -       NAE LEVEL:  NAE Score (Last Two) 2/18/2013 5/23/2014   NAE Raw Score 48 45   Activation Score 80 73.1   NAE Level 4 4       DATA  Extended Session (60+ minutes): No  Interactive Complexity: No  Crisis: No    Treatment Objective(s) Addressed in This Session:  Target Behavior(s): disease management/lifestyle changes manage diabetes better    Grief / Loss: will process grief/loss issues in an adaptive manner  Psychological distress related to Diabetes    Current Stressors / Issues:  [facility] met with patient at PCP request to assess current behavioral health needs and provide information and support as necessary. Kim said things are good and wants to make sure she is scheduled for all of her appointments with speciality providers and the dentist. She reported sleeping 8-9 hours a night, waking up an average of four times to go to the restroom but is able to fall back asleep with no problem. She said her mood has been \"fine,\" stating there is no reason for her to be sad because she is constantly with her grandchildren who make her laugh and give her ross- she stated that her son and grandchildren live with her. She also stated that she is able to get " "alone time away from others in her room and notices her energy increasing after she does this. Pt. Reported that her appetite is \"too good\" but is worried and confused about what kinds of food she can only eat in moderation due to her special diet- the PCP provided her with nutritional education and this writer helped her brainstorm ways to remember and track her food intake. In regards to exercise, Kim said in the past, she has walked outdoors with her neighbor who is about the same age, but has not been able to lately due to the ice. She noted however, that she is walking in the halls and up/down stairs of her residence. Kim denied alcohol or drug use and said she is smoking 3 cigarettes/day which is down from 6/cigarettes a day during her last appointment. She said she wants to try quitting cold turkey soon, noting that she has done this plenty of times in the past. When she gets an urge to use a cigarette, she is able to eat a hard candy until the urge passes.     Reviewed new and ongoing stressors  Reviewed mental health symptoms and appropriate coping mechanisms    I affirmed the steps this patient has taken to address physical and behavioral health issues, and offered continued behavioral health services or referral, now or in the future, as needed by the patient.    Progress on Treatment Objective(s) / Homework:  Minimal progress - ACTION (Actively working towards change); Intervened by reinforcing change plan / affirming steps taken    Motivational Interviewing    MI Intervention: Expressed Empathy/Understanding, Supported Autonomy, Collaboration, Evocation, Open-ended questions, Reflections: simple and complex and Change talk (evoked)     Change Talk Expressed by the Patient: Desire to change Ability to change Reasons to change Need to change Committment to change Activation Taking steps    Provider Response to Change Talk: E - Evoked more info from patient about behavior change, A - Affirmed " patient's thoughts, decisions, or attempts at behavior change, R - Reflected patient's change talk and S - Summarized patient's change talk statements      Care Plan review completed: No    Medication Review:  No changes to current psychiatric medication(s)    Medication Compliance:  Yes    Changes in Health Issues:   None reported    Chemical Use Review:   Substance Use: Chemical use reviewed, no active concerns identified      Tobacco Use: Yes, decrease.  Client reports frequency of use 3 cigarettes daily. Provided encouragement to quit   Provided support and affirmation for steps taken towards quiting     Assessment: Current Emotional / Mental Status (status of significant symptoms):  Risk status (Self / Other harm or suicidal ideation)  Patient denies a history of suicidal ideation, suicide attempts, self-injurious behavior, homicidal ideation, homicidal behavior and and other safety concerns  Patient denies current fears or concerns for personal safety.  Patient denies current or recent suicidal ideation or behaviors.  Patient denies current or recent homicidal ideation or behaviors.  Patient denies current or recent self injurious behavior or ideation.  Patient denies other safety concerns.  A safety and risk management plan has not been developed at this time, however patient was encouraged to call [place] [phone] should there be a change in any of these risk factors.    Appearance:   Appropriate   Eye Contact:   Fair   Psychomotor Behavior: Normal   Attitude:   Cooperative   Orientation:   All  Speech   Rate / Production: Impoverished  Monotone    Volume:  Normal   Mood:    Normal  Affect:    Appropriate  Bright   Thought Content:  Clear   Thought Form:  Logical   Insight:    Fair     Diagnoses:  1. Adjustment disorder with depressed mood    2. Grief      Collateral Reports Completed:  Not Applicable    Plan: (Homework, other):  Patient was given information about behavioral services and encouraged to  schedule a follow up appointment with the clinic [facility] as needed.  She was also given information about mental health symptoms and treatment options .  CD Recommendations: Maintain Sobriety. This note was written by Reji Lopez, [facility] intern and reviewed by ETIENNE Arndt, [facility]      ______________________________________________________________________    Integrated Primary Care Behavioral Health Treatment Plan    Patient's Name: Kim Anne  YOB: 1945    Date: To be completed

## 2018-02-13 ASSESSMENT — PATIENT HEALTH QUESTIONNAIRE - PHQ9: SUM OF ALL RESPONSES TO PHQ QUESTIONS 1-9: 0

## 2018-02-15 ENCOUNTER — CARE COORDINATION (OUTPATIENT)
Dept: GERIATRIC MEDICINE | Facility: CLINIC | Age: 73
End: 2018-02-15

## 2018-02-15 LAB
COPATH REPORT: NORMAL
PAP: NORMAL

## 2018-02-15 NOTE — PROGRESS NOTES
Arranged transportation thru [facility] for the below appt:  Appt Date & Time: 2/20 @ 10:15 am  Clinic Name & Address:  [address] [address]  Transportation Provider: [employer]   time:  9:15 am    Notified Kim of  time.    Brianna Barrios  Case Management Specialist  [facility]   [phone]

## 2018-02-16 ENCOUNTER — OFFICE VISIT (OUTPATIENT)
Dept: NEPHROLOGY | Facility: CLINIC | Age: 73
End: 2018-02-16
Attending: PHYSICIAN ASSISTANT
Payer: COMMERCIAL

## 2018-02-16 VITALS
HEART RATE: 73 BPM | HEIGHT: 66 IN | BODY MASS INDEX: 27.1 KG/M2 | SYSTOLIC BLOOD PRESSURE: 182 MMHG | DIASTOLIC BLOOD PRESSURE: 73 MMHG | WEIGHT: 168.6 LBS | OXYGEN SATURATION: 97 %

## 2018-02-16 DIAGNOSIS — E87.20 ACIDOSIS: ICD-10-CM

## 2018-02-16 DIAGNOSIS — I15.0 RENOVASCULAR HYPERTENSION: ICD-10-CM

## 2018-02-16 DIAGNOSIS — N18.4 CKD (CHRONIC KIDNEY DISEASE) STAGE 4, GFR 15-29 ML/MIN (H): ICD-10-CM

## 2018-02-16 DIAGNOSIS — N18.4 CKD (CHRONIC KIDNEY DISEASE) STAGE 4, GFR 15-29 ML/MIN (H): Primary | ICD-10-CM

## 2018-02-16 DIAGNOSIS — E55.9 HYPOVITAMINOSIS D: ICD-10-CM

## 2018-02-16 DIAGNOSIS — N25.81 SECONDARY RENAL HYPERPARATHYROIDISM (H): ICD-10-CM

## 2018-02-16 LAB
ALBUMIN SERPL-MCNC: 1.8 G/DL (ref 3.4–5)
ANION GAP SERPL CALCULATED.3IONS-SCNC: 8 MMOL/L (ref 3–14)
BUN SERPL-MCNC: 37 MG/DL (ref 7–30)
CALCIUM SERPL-MCNC: 7.6 MG/DL (ref 8.5–10.1)
CHLORIDE SERPL-SCNC: 114 MMOL/L (ref 94–109)
CO2 SERPL-SCNC: 20 MMOL/L (ref 20–32)
CREAT SERPL-MCNC: 3.11 MG/DL (ref 0.52–1.04)
CREAT UR-MCNC: 129 MG/DL
DEPRECATED CALCIDIOL+CALCIFEROL SERPL-MC: 12 UG/L (ref 20–75)
ERYTHROCYTE [DISTWIDTH] IN BLOOD BY AUTOMATED COUNT: 15.1 % (ref 10–15)
FERRITIN SERPL-MCNC: 134 NG/ML (ref 8–252)
FINAL DIAGNOSIS: NORMAL
GFR SERPL CREATININE-BSD FRML MDRD: 15 ML/MIN/1.7M2
GLUCOSE SERPL-MCNC: 111 MG/DL (ref 70–99)
HCT VFR BLD AUTO: 35.7 % (ref 35–47)
HGB BLD-MCNC: 11 G/DL (ref 11.7–15.7)
HPV HR 12 DNA CVX QL NAA+PROBE: NEGATIVE
HPV16 DNA SPEC QL NAA+PROBE: NEGATIVE
HPV18 DNA SPEC QL NAA+PROBE: NEGATIVE
IRON SATN MFR SERPL: 28 % (ref 15–46)
IRON SERPL-MCNC: 46 UG/DL (ref 35–180)
MCH RBC QN AUTO: 28.3 PG (ref 26.5–33)
MCHC RBC AUTO-ENTMCNC: 30.8 G/DL (ref 31.5–36.5)
MCV RBC AUTO: 92 FL (ref 78–100)
PHOSPHATE SERPL-MCNC: 4.5 MG/DL (ref 2.5–4.5)
PLATELET # BLD AUTO: 269 10E9/L (ref 150–450)
POTASSIUM SERPL-SCNC: 4.5 MMOL/L (ref 3.4–5.3)
PROT UR-MCNC: 14.62 G/L
PROT/CREAT 24H UR: 11.34 G/G CR (ref 0–0.2)
PTH-INTACT SERPL-MCNC: 536 PG/ML (ref 12–72)
RBC # BLD AUTO: 3.89 10E12/L (ref 3.8–5.2)
SODIUM SERPL-SCNC: 143 MMOL/L (ref 133–144)
SPECIMEN DESCRIPTION: NORMAL
SPECIMEN SOURCE CVX/VAG CYTO: NORMAL
TIBC SERPL-MCNC: 163 UG/DL (ref 240–430)
WBC # BLD AUTO: 5.1 10E9/L (ref 4–11)

## 2018-02-16 PROCEDURE — 82728 ASSAY OF FERRITIN: CPT | Performed by: PHYSICIAN ASSISTANT

## 2018-02-16 PROCEDURE — 84156 ASSAY OF PROTEIN URINE: CPT | Performed by: PHYSICIAN ASSISTANT

## 2018-02-16 PROCEDURE — G0463 HOSPITAL OUTPT CLINIC VISIT: HCPCS | Mod: ZF

## 2018-02-16 PROCEDURE — 82306 VITAMIN D 25 HYDROXY: CPT | Performed by: PHYSICIAN ASSISTANT

## 2018-02-16 PROCEDURE — 83550 IRON BINDING TEST: CPT | Performed by: PHYSICIAN ASSISTANT

## 2018-02-16 PROCEDURE — 83970 ASSAY OF PARATHORMONE: CPT | Performed by: PHYSICIAN ASSISTANT

## 2018-02-16 PROCEDURE — 83540 ASSAY OF IRON: CPT | Performed by: PHYSICIAN ASSISTANT

## 2018-02-16 PROCEDURE — 80069 RENAL FUNCTION PANEL: CPT | Performed by: PHYSICIAN ASSISTANT

## 2018-02-16 PROCEDURE — 36415 COLL VENOUS BLD VENIPUNCTURE: CPT | Performed by: PHYSICIAN ASSISTANT

## 2018-02-16 PROCEDURE — 85027 COMPLETE CBC AUTOMATED: CPT | Performed by: PHYSICIAN ASSISTANT

## 2018-02-16 ASSESSMENT — PAIN SCALES - GENERAL: PAINLEVEL: MILD PAIN (3)

## 2018-02-16 NOTE — PATIENT INSTRUCTIONS
1.  Bring BP readings and medicines with you to next appointment.  2.  Congratulations on keeping the potassium under control and keep up the good work.  3.  Please be sure to attend your cardiology visit on 2/27/2017.
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
## 2018-02-16 NOTE — MR AVS SNAPSHOT
After Visit Summary   2/16/2018    Kim Anne    MRN: [id number]           Patient Information     Date Of Birth          1945        Visit Information        Provider Department      2/16/2018 11:00 AM Wendy Espinal PA-C [facility]        Care Instructions    1.  Bring BP readings and medicines with you to next appointment.  2.  Congratulations on keeping the potassium under control and keep up the good work.  3.  Please be sure to attend your cardiology visit on 2/27/2017.            Follow-ups after your visit        Follow-up notes from your care team     Return in about 2 weeks (around 3/5/2018).      Your next 10 appointments already scheduled     Feb 20, 2018 10:30 AM CST   (Arrive by 10:15 AM)   MA SCREENING DIGITAL BILATERAL with URBCMA1   [facility] ([facility])    [address] [place] [address], [address]  [facility] [id number] [phone]           Do not use any powder, lotion or deodorant under your arms or on your breast. If you do, we will ask you to remove it before your exam.  Wear comfortable, two-piece clothing.  If you have any allergies, tell your care team.  Bring any previous mammograms from other facilities or have them mailed to the breast center.            Feb 27, 2018  6:00 PM CST   (Arrive by 5:45 PM)   NEW PANCREAS/KIDNEY TRANSPLANT WORK-UP with Quincy Griffin MD   [facility] ([facility])    [address] [place] [address]  [address]  [facility] [address] [phone]            Mar 02, 2018 10:00 AM CST   Lab with  LAB   [facility] ([facility])    [address] [place] [address]  1st Floor  [facility] [id number] [phone]            Mar 02, 2018 11:00 AM CST   (Arrive by 10:30 AM)   Return Visit with JANE Montiel [facility] ([facility])    [address]  [address]  [facility] [id number] [phone]             Mar 05, 2018 10:30 AM CST   (Arrive by 10:15 AM)   SHORT with Denton Yuen RPH   [facility] Therapy [facility] ([facility])    [address]  3rd Floor  [facility] [address] [phone]            Mar 12, 2018 10:00 AM CDT   Return Visit with JUNIOR Bishop CNP   [facility] ([facility])    [address]  [address]  [facility] [id number] [phone]            Mar 12, 2018 10:00 AM CDT   Return Visit with LAMINE Chahal   [facility] ([facility])    [address]  [address]  [facility] [id number] [phone]            Mar 29, 2018  7:30 AM CDT   PFT VISIT with GIOVANNA PFL B   [facility] Pulmonary Function Testing ([facility])    [address]  3rd Floor  [facility] [id number] [phone]            Mar 29, 2018  8:00 AM CDT   (Arrive by 7:45 AM)   Return Visit with Margret Packer MD   [facility] ([facility])    [address]  [address]  [facility] [id number] [phone]              Who to contact     If you have questions or need follow up information about today's clinic visit or your schedule please contact [facility] directly at [phone].  Normal or non-critical lab and imaging results will be communicated to you by MyChart, letter or phone within 4 business days after the clinic has received the results. If you do not hear from us within 7 days, please contact the clinic through MyChart or phone. If you have a critical or abnormal lab result, we will notify you by phone as soon as possible.  Submit refill requests through [employer] or call your pharmacy and they will forward the refill request to us. Please allow 3 business days for your refill to be completed.           "Additional Information About Your Visit        MyChart Information     [employer]Pads lets you send messages to your doctor, view your test results, renew your prescriptions, schedule appointments and more. To sign up, go to [email or web address] . Click on \"Log in\" on the left side of the screen, which will take you to the Welcome page. Then click on \"Sign up Now\" on the right side of the page.     You will be asked to enter the access code listed below, as well as some personal information. Please follow the directions to create your username and password.     Your access code is: [email or web address]  Expires: 3/1/2018  6:30 AM     Your access code will  in 90 days. If you need help or a new code, please call your [place] clinic or [phone].        Care EveryWhere ID     This is your Care EveryWhere ID. This could be used by other organizations to access your [place] medical records  [id number]        Your Vitals Were     Pulse Height Pulse Oximetry BMI (Body Mass Index)          73 1.676 m (5' 6\") 97% 27.21 kg/m2         Blood Pressure from Last 3 Encounters:   18 182/73   18 120/60   18 (!) 84/52    Weight from Last 3 Encounters:   18 76.5 kg (168 lb 9.6 oz)   18 78 kg (172 lb)   18 77.1 kg (170 lb)              Today, you had the following     No orders found for display         Today's Medication Changes          These changes are accurate as of 18 12:20 PM.  If you have any questions, ask your nurse or doctor.               These medicines have changed or have updated prescriptions.        Dose/Directions    furosemide 40 MG tablet   Commonly known as:  LASIX   This may have changed:    - how much to take  - when to take this   Used for:  Hyperkalemia, Edema, unspecified type        Dose:  40 mg   Take 1 tablet (40 mg) by mouth 2 times daily   Quantity:  60 tablet   Refills:  3       hydrALAZINE 25 MG tablet   Commonly known as:  APRESOLINE   This may have changed:  "   - how much to take  - when to take this   Used for:  HTN (hypertension), Chronic kidney disease, stage 4 (severe) (H)        Dose:  25 mg   Take 1 tablet (25 mg) by mouth 2 times daily   Quantity:  60 tablet   Refills:  1                Primary Care Provider Office Phone # Fax JUNIOR [place] [phone]       [address] [facility] [address]  [facility] [address]        Equal Access to Services     CECIL TOLLIVER : Hadii aad ku hadasho Soomaali, waaxda luqadaha, qaybta kaalmada adeegyada, waxay idiin hayaan adeeg kharaanna laaudrey . So [facility] [phone].    ATENCIÓN: Si habla español, tiene a bailey disposición servicios gratuitos de asistencia lingüística. [facility] [phone].    We comply with applicable federal civil rights laws and [place] laws. We do not discriminate on the basis of race, color, national origin, age, disability, sex, sexual orientation, or gender identity.            Thank you!     Thank you for choosing [facility]  for your care. Our goal is always to provide you with excellent care. Hearing back from our patients is one way we can continue to improve our services. Please take a few minutes to complete the written survey that you may receive in the mail after your visit with us. Thank you!             Your Updated Medication List - Protect others around you: Learn how to safely use, store and throw away your medicines at www.disposemymeds.org.          This list is accurate as of 2/16/18 12:20 PM.  Always use your most recent med list.                   Brand Name Dispense Instructions for use Diagnosis    albuterol 108 (90 BASE) MCG/ACT Inhaler    PROAIR HFA/PROVENTIL HFA/VENTOLIN HFA    18 g    Inhale 2 puffs into the lungs every 6 hours as needed for shortness of breath / dyspnea or wheezing    Chronic obstructive pulmonary disease, unspecified COPD type (H)       amLODIPine 5 MG tablet    NORVASC    60 tablet    Take 2 tablets (10 mg) by mouth daily     Renovascular hypertension       ASPIRIN LOW DOSE 81 MG EC tablet   Generic drug:  aspirin     30 tablet    Take 1 tablet (81 mg) by mouth daily    History of MI (myocardial infarction), Essential hypertension, benign       atorvastatin 40 MG tablet    LIPITOR    90 tablet    Take 1 tablet (40 mg) by mouth daily    Hyperlipidemia LDL goal <100       azithromycin 250 MG tablet    ZITHROMAX    6 tablet    Two tablets first day, then one tablet daily for four days.    Cough       blood glucose monitoring lancets     1 Box    Test BS four times daily as directed    Type 2 diabetes mellitus without complication, with long-term current use of insulin (H)       blood glucose monitoring test strip    FREESTYLE LITE    50 strip    TEST BLOOD SUGAR TWO TIMES A DAY    Type 2 diabetes mellitus without complication, with long-term current use of insulin (H)       docusate sodium 100 MG capsule    COLACE    60 capsule    Take 1 capsule (100 mg) by mouth 2 times daily    Constipation, unspecified constipation type       EUCERIN CALMING DAILY MOIST Crea     1 Tube    Externally apply 1 dose * topically daily    Type II or unspecified type diabetes mellitus with neurological manifestations, not stated as uncontrolled(250.60) (H)       ferrous sulfate 325 (65 FE) MG tablet    IRON    100 tablet    Take 1 tablet (325 mg) by mouth daily (with breakfast)    Anemia, iron deficiency       fluticasone 50 MCG/ACT spray    FLONASE    1 Bottle    Spray 1-2 sprays into both nostrils daily    Acute nasopharyngitis       furosemide 40 MG tablet    LASIX    60 tablet    Take 1 tablet (40 mg) by mouth 2 times daily    Hyperkalemia, Edema, unspecified type       hydrALAZINE 25 MG tablet    APRESOLINE    60 tablet    Take 1 tablet (25 mg) by mouth 2 times daily    HTN (hypertension), Chronic kidney disease, stage 4 (severe) (H)       insulin glargine 100 UNIT/ML injection    LANTUS    15 mL    Inject 10 Units Subcutaneous every morning    Controlled  type 2 diabetes mellitus with stage 4 chronic kidney disease, with long-term current use of insulin (H)       insulin pen needle 31G X 6 MM     120 each    Use as directed    Type 2 diabetes mellitus without complication (H)       levothyroxine 200 MCG tablet    SYNTHROID/LEVOTHROID    90 tablet    Take 1 tablet (200 mcg) by mouth See Admin Instructions New daily increase. Recheck labs in 8 weeks.    Other specified hypothyroidism       metoprolol succinate 25 MG 24 hr tablet    TOPROL-XL    90 tablet    Take 1 tablet (25 mg) by mouth daily    Hypertension associated with diabetes (H)       mometasone-formoterol 100-5 MCG/ACT oral inhaler    DULERA    1 Inhaler    Inhale 2 puffs into the lungs 2 times daily    COPD (chronic obstructive pulmonary disease) (H)       nicotine polacrilex 2 MG lozenge    COMMIT    100 lozenge    Dissolve 1 lozenge orally every 4 hours as directed.    Chronic obstructive pulmonary disease, unspecified COPD type (H)       nitroGLYcerin 0.4 MG sublingual tablet    NITROSTAT    25 tablet    Place 1 tablet (0.4 mg) under the tongue every 5 minutes as needed for chest pain    History of MI (myocardial infarction)       nystatin 541165 UNIT/GM Powd    MYCOSTATIN    30 g    Apply 1 g topically 3 times daily as needed    Groin rash       * order for DME     1 Device    One wheeled walker with seat and brakes and basket    Risk for falls       * order for DME     2 Device    Equipment being ordered: two pairs moderate knee high support hose    Edema, unspecified type       * order for DME     2 each    Equipment being ordered: Compression socks. Strength:15-20 mmHg    Localized edema       oxyCODONE IR 10 MG tablet    ROXICODONE    90 tablet    Take 1 tablet (10 mg) by mouth every 8 hours as needed    Chronic low back pain without sciatica, unspecified back pain laterality       polyethylene glycol powder    MIRALAX    510 g    Take 17 g (1 capful) by mouth daily    Slow transit constipation        SM ALCOHOL PREP 70 % Pads     100 each    Externally apply 1 pad topically 4 times daily    Type 2 diabetes mellitus without complication, with long-term current use of insulin (H)       sodium bicarbonate 325 MG tablet     180 tablet    Take 2 tablets (650 mg) by mouth 3 times daily    Acidosis, CKD (chronic kidney disease) stage 4, GFR 15-29 ml/min (H)       tiotropium 18 MCG capsule    SPIRIVA HANDIHALER    90 capsule    Inhale contents of one capsule daily.    Pulmonary emphysema, unspecified emphysema type (H)       vitamin D 2000 UNITS tablet     60 tablet    Take 2,000 Units by mouth daily    Vitamin D deficiency disease       * Notice:  This list has 3 medication(s) that are the same as other medications prescribed for you. Read the directions carefully, and ask your doctor or other care provider to review them with you.
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
## 2018-02-16 NOTE — NURSING NOTE
"Chief Complaint   Patient presents with     RECHECK     CKd follow up       Initial /73  Pulse 73  Ht 1.676 m (5' 6\")  Wt 76.5 kg (168 lb 9.6 oz)  SpO2 97%  BMI 27.21 kg/m2 Estimated body mass index is 27.21 kg/(m^2) as calculated from the following:    Height as of this encounter: 1.676 m (5' 6\").    Weight as of this encounter: 76.5 kg (168 lb 9.6 oz).  Medication Reconciliation: complete   ANNALEE SYED CMA      "

## 2018-02-20 ENCOUNTER — RADIANT APPOINTMENT (OUTPATIENT)
Dept: MAMMOGRAPHY | Facility: CLINIC | Age: 73
End: 2018-02-20
Attending: NURSE PRACTITIONER
Payer: COMMERCIAL

## 2018-02-20 DIAGNOSIS — Z12.31 ENCOUNTER FOR SCREENING MAMMOGRAM FOR BREAST CANCER: ICD-10-CM

## 2018-02-20 PROCEDURE — 77067 SCR MAMMO BI INCL CAD: CPT

## 2018-02-21 ENCOUNTER — CARE COORDINATION (OUTPATIENT)
Dept: GERIATRIC MEDICINE | Facility: CLINIC | Age: 73
End: 2018-02-21

## 2018-02-21 ENCOUNTER — TELEPHONE (OUTPATIENT)
Dept: TRANSPLANT | Facility: CLINIC | Age: 73
End: 2018-02-21

## 2018-02-21 NOTE — PROGRESS NOTES
Arranged transportation thru [facility] for the below appt:  Appt Date & Time: 2/23 @ 12:00 pm  Clinic Name & Address:  [address] [address]  Transportation Provider: [employer]   time:  11-11:30 am    Also scheduled the following March rides:    Arranged transportation thru [facility] for the below appt:  Appt Date & Time: 3/02 @ 10:00 am  Clinic Name & Address:  [place] [id number]  Transportation Provider: [employer]   time:  9-9:30 am    Arranged transportation thru [facility] for the below appt:  Appt Date & Time: 3/05 @ 10:15 am  Clinic Name & Address:  [address] [address]  Transportation Provider: [employer]   time:  9:15-9:45 am    Arranged transportation thru [facility] for the below appt:  Appt Date & Time: 3/12 @ 10:00 am  Clinic Name & Address:  [facility] ([address] [place] [address])  Transportation Provider: [employer]   time:  9-9:30 am    Arranged transportation thru [facility] for the below appt:  Appt Date & Time: 3/29 @ 7:30 am  Clinic Name & Address:  [address] [address]  Transportation Provider: [employer]   time:  6:30-7:00 am    Notified Kim of  times.    Brianna Barrios  Case Management Specialist  [facility]   [phone]

## 2018-02-21 NOTE — TELEPHONE ENCOUNTER
Called pt and asked her about no shows for cardiology appts here - pt states she has had trouble getting rides scheduled. Knows she has an appt with cardiologist again 02- at 6 PM.  Pt states her colonoscopy is scheduled in the morning on the same day she thinks.  I explained that it will be difficult to see the cardiologist in the [place] on the same day as a colonoscopy and recommended she changes one or the other appt. Pt then not sure when colonoscopy was scheduled for. Instructed pt to call [facility] and her dentist to find out appt dates/times for both and then call me back with this. I explained she will remain listed on INACTIVE status until all of eval is completed. Pt expressed good understanding of all and was in good agreement with the plan.

## 2018-02-26 DIAGNOSIS — I25.2 HISTORY OF MI (MYOCARDIAL INFARCTION): ICD-10-CM

## 2018-02-26 DIAGNOSIS — E03.8 OTHER SPECIFIED HYPOTHYROIDISM: ICD-10-CM

## 2018-02-26 DIAGNOSIS — I10 ESSENTIAL HYPERTENSION, BENIGN: ICD-10-CM

## 2018-02-26 DIAGNOSIS — N18.4 CKD (CHRONIC KIDNEY DISEASE) STAGE 4, GFR 15-29 ML/MIN (H): Primary | ICD-10-CM

## 2018-02-27 ENCOUNTER — OFFICE VISIT (OUTPATIENT)
Dept: CARDIOLOGY | Facility: CLINIC | Age: 73
End: 2018-02-27
Attending: INTERNAL MEDICINE
Payer: COMMERCIAL

## 2018-02-27 VITALS
BODY MASS INDEX: 27.26 KG/M2 | SYSTOLIC BLOOD PRESSURE: 133 MMHG | DIASTOLIC BLOOD PRESSURE: 67 MMHG | HEART RATE: 71 BPM | WEIGHT: 168.9 LBS | OXYGEN SATURATION: 97 %

## 2018-02-27 DIAGNOSIS — I73.9 PVD (PERIPHERAL VASCULAR DISEASE) (H): ICD-10-CM

## 2018-02-27 DIAGNOSIS — N18.4 CHRONIC RENAL FAILURE, STAGE 4 (SEVERE) (H): ICD-10-CM

## 2018-02-27 DIAGNOSIS — Z76.82 ORGAN TRANSPLANT CANDIDATE: ICD-10-CM

## 2018-02-27 DIAGNOSIS — R06.09 EXERTIONAL DYSPNEA: Primary | ICD-10-CM

## 2018-02-27 DIAGNOSIS — J98.4 DISEASE OF LUNG: ICD-10-CM

## 2018-02-27 DIAGNOSIS — Z01.810 PRE-OPERATIVE CARDIOVASCULAR EXAMINATION: ICD-10-CM

## 2018-02-27 DIAGNOSIS — I10 ESSENTIAL HYPERTENSION: ICD-10-CM

## 2018-02-27 PROCEDURE — 99204 OFFICE O/P NEW MOD 45 MIN: CPT | Mod: ZP | Performed by: INTERNAL MEDICINE

## 2018-02-27 PROCEDURE — G0463 HOSPITAL OUTPT CLINIC VISIT: HCPCS | Mod: ZF

## 2018-02-27 RX ORDER — ASPIRIN 81 MG/1
TABLET ORAL
Qty: 120 TABLET | Refills: 2 | Status: SHIPPED | OUTPATIENT
Start: 2018-02-27 | End: 2019-03-29

## 2018-02-27 RX ORDER — LEVOTHYROXINE SODIUM 200 UG/1
TABLET ORAL
Qty: 30 TABLET | Refills: 1 | Status: SHIPPED | OUTPATIENT
Start: 2018-02-27 | End: 2018-07-02

## 2018-02-27 ASSESSMENT — PAIN SCALES - GENERAL: PAINLEVEL: NO PAIN (0)
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
## 2018-02-27 NOTE — NURSING NOTE
Chief Complaint   Patient presents with     New Patient     new patient pre kidney transplant patient spoke to [facility]       Vitals were taken and medications were reconciled.     Татьяна Queen RMA  5:22 PM

## 2018-02-27 NOTE — MR AVS SNAPSHOT
After Visit Summary   2/27/2018    Kim Anne    MRN: [id number]           Patient Information     Date Of Birth          1945        Visit Information        Provider Department      2/27/2018 6:00 PM Quincy Griffin MD [facility]        Today's Diagnoses     Chronic renal failure, stage 4 (severe) (H)        Diabetes mellitus, type 2 (H)        History of tobacco use        Organ transplant candidate        PVD (peripheral vascular disease) (H)        Essential hypertension        Disease of lung          Care Instructions    You were seen at the [facility] clinic today.  You saw Dr. Griffin  Here are your Instructions:    1.  Dobutamine stress echo.      Emma Akbar RN  Nurse Care Coordinator  Office:  [phone] option #1, then #3 & ask for Emma (nurse line)  Fax:  [phone]  After Hours:  [phone]  Appointments:  [phone] option #1, then option #1                        Follow-ups after your visit        Your next 10 appointments already scheduled     Mar 01, 2018 12:30 PM CST   Lab with  LAB   [facility] ([facility])    [address]  1st Floor  [facility] [address] [phone]            Mar 01, 2018  1:30 PM CST   (Arrive by 1:00 PM)   Return Visit with Wendy Espinal PA-C   [facility] Nephrology ([facility])    [address]  [address]  [facility] [id number] [phone]            Mar 02, 2018 11:00 AM CST   Ech Dobutamine Stress Test with [id number]   [facility] [place],  [facility] ([facility] [place])    [address] [id number] [phone]           1.  Please bring or wear a comfortable two-piece outfit and walking shoes. 2.  Stop eating 3 hours before the test. You may drink water or juice. 3.  Stop all caffeine 12 hours before the test. This includes coffee, tea,  soda pop, chocolate and certain medicines (such as Anacin and Excederin). Also avoid decaf coffee and tea, as these contain small amounts of caffeine. 4.  No alcohol, smoking or use of other tobacco products for 12 hours before the test. 5.  Refer to your provider instructions to see if you need to stop any medications (such as beta-blockers or nitrates) for this test. 6.  For patients with diabetes: -   If you take insulin, call your diabetes care team. Ask if you should take a   dose the morning of your test. -   If you take diabetes medicine by mouth, don't take it on the morning of your test. Bring it with you to take after the test.  (If you have questions, call your diabetes care team) 7.  When you arrive, please tell us if: -   You have diabetes. -   You have taken Viagra, Cialis or Levitra in the past 48 hours. 8.  For any questions that cannot be answered, please contact the ordering physician            Mar 05, 2018 10:30 AM CST   (Arrive by 10:15 AM)   SHORT with JOHN Gao [facility] Medication Therapy Management ([facility])    [address]  3rd [facility] [address]   [phone]            Mar 12, 2018 10:00 AM CDT   Return Visit with JUNIOR Bishop CNP   [facility] ([facility])    [address]  [address]  [facility] [address]   [phone]            Mar 12, 2018 10:00 AM CDT   Return Visit with LAMINE Chahal   [facility] ([facility])    [address]  [address]  [facility] [address]   [phone]            Mar 29, 2018  7:30 AM CDT   PFT VISIT with GIOVANNA CADENA   [facility] Pulmonary Function Testing ([facility])    [address]  [address] [facility] [address]   [phone]            Mar 29, 2018  8:00 AM CDT   (Arrive by 7:45 AM)   Return Visit with  "Margret Packer MD   [facility] ([facility])    [address]  [address] [place] [address] [phone]              Future tests that were ordered for you today     Open Future Orders        Priority Expected Expires Ordered    Dobutamine Stress Echocardiogram Routine  2019    Renal panel Routine 3/1/2018 2018 2018    CBC with platelets Routine 3/1/2018 2018 2018            Who to contact     If you have questions or need follow up information about today's clinic visit or your schedule please contact [facility] directly at [phone].  Normal or non-critical lab and imaging results will be communicated to you by octoScopehart, letter or phone within 4 business days after the clinic has received the results. If you do not hear from us within 7 days, please contact the clinic through octoScopehart or phone. If you have a critical or abnormal lab result, we will notify you by phone as soon as possible.  Submit refill requests through Cytoguide or call your pharmacy and they will forward the refill request to us. Please allow 3 business days for your refill to be completed.          Additional Information About Your Visit        octoScopehart Information     Cytoguide lets you send messages to your doctor, view your test results, renew your prescriptions, schedule appointments and more. To sign up, go to [email or web address] . Click on \"Log in\" on the left side of the screen, which will take you to the Welcome page. Then click on \"Sign up Now\" on the right side of the page.     You will be asked to enter the access code listed below, as well as some personal information. Please follow the directions to create your username and password.     Your access code is: [email or web address]  Expires: 3/1/2018  6:30 AM     Your access code will  in 90 days. If you need help or a new code, please call your [facility] or " [phone].        Care EveryWhere ID     This is your Care EveryWhere ID. This could be used by other organizations to access your [place] medical records  [id number]        Your Vitals Were     Pulse Pulse Oximetry BMI (Body Mass Index)             71 97% 27.26 kg/m2          Blood Pressure from Last 3 Encounters:   02/27/18 133/67   02/16/18 182/73   02/12/18 120/60    Weight from Last 3 Encounters:   02/27/18 76.6 kg (168 lb 14.4 oz)   02/16/18 76.5 kg (168 lb 9.6 oz)   02/12/18 78 kg (172 lb)                 Today's Medication Changes          These changes are accurate as of 2/27/18  6:39 PM.  If you have any questions, ask your nurse or doctor.               These medicines have changed or have updated prescriptions.        Dose/Directions    furosemide 40 MG tablet   Commonly known as:  LASIX   This may have changed:    - how much to take  - when to take this   Used for:  Hyperkalemia, Edema, unspecified type        Dose:  40 mg   Take 1 tablet (40 mg) by mouth 2 times daily   Quantity:  60 tablet   Refills:  3       hydrALAZINE 25 MG tablet   Commonly known as:  APRESOLINE   This may have changed:    - how much to take  - when to take this   Used for:  HTN (hypertension), Chronic kidney disease, stage 4 (severe) (H)        Dose:  25 mg   Take 1 tablet (25 mg) by mouth 2 times daily   Quantity:  60 tablet   Refills:  1                Primary Care Provider Office Phone # Fax #    Ailyn JUNIOR Pickett [phone]       [address] [facility] [address]  [facility] [address]        Equal Access to Services     [facility] UNRULY AH: Hadii alysha redd hadasho Soluiz, waaxda luqadaha, qaybta kaalmada adeegyada, kassandra shoemaker. So [facility] [phone].    ATENCIÓN: Si habla español, tiene a bailey disposición servicios gratuitos de asistencia lingüística. Llame al [phone].    We comply with applicable federal civil rights laws and [place] laws. We do not discriminate on the basis of  race, color, national origin, age, disability, sex, sexual orientation, or gender identity.            Thank you!     Thank you for choosing [facility]  for your care. Our goal is always to provide you with excellent care. Hearing back from our patients is one way we can continue to improve our services. Please take a few minutes to complete the written survey that you may receive in the mail after your visit with us. Thank you!             Your Updated Medication List - Protect others around you: Learn how to safely use, store and throw away your medicines at www.disposemymeds.org.          This list is accurate as of 2/27/18  6:39 PM.  Always use your most recent med list.                   Brand Name Dispense Instructions for use Diagnosis    albuterol 108 (90 BASE) MCG/ACT Inhaler    PROAIR HFA/PROVENTIL HFA/VENTOLIN HFA    18 g    Inhale 2 puffs into the lungs every 6 hours as needed for shortness of breath / dyspnea or wheezing    Chronic obstructive pulmonary disease, unspecified COPD type (H)       amLODIPine 5 MG tablet    NORVASC    60 tablet    Take 2 tablets (10 mg) by mouth daily    Renovascular hypertension       ASPIR-LOW 81 MG EC tablet   Generic drug:  aspirin     120 tablet    TAKE 1 TABLET BY MOUTH EVERY DAY    History of MI (myocardial infarction), Essential hypertension, benign       atorvastatin 40 MG tablet    LIPITOR    90 tablet    Take 1 tablet (40 mg) by mouth daily    Hyperlipidemia LDL goal <100       azithromycin 250 MG tablet    ZITHROMAX    6 tablet    Two tablets first day, then one tablet daily for four days.    Cough       blood glucose monitoring lancets     1 Box    Test BS four times daily as directed    Type 2 diabetes mellitus without complication, with long-term current use of insulin (H)       blood glucose monitoring test strip    FREESTYLE LITE    50 strip    TEST BLOOD SUGAR TWO TIMES A DAY    Type 2 diabetes mellitus without complication, with long-term current  use of insulin (H)       docusate sodium 100 MG capsule    COLACE    60 capsule    Take 1 capsule (100 mg) by mouth 2 times daily    Constipation, unspecified constipation type       EUCERIN CALMING DAILY MOIST Crea     1 Tube    Externally apply 1 dose * topically daily    Type II or unspecified type diabetes mellitus with neurological manifestations, not stated as uncontrolled(250.60) (H)       ferrous sulfate 325 (65 FE) MG tablet    IRON    100 tablet    Take 1 tablet (325 mg) by mouth daily (with breakfast)    Anemia, iron deficiency       fluticasone 50 MCG/ACT spray    FLONASE    1 Bottle    Spray 1-2 sprays into both nostrils daily    Acute nasopharyngitis       furosemide 40 MG tablet    LASIX    60 tablet    Take 1 tablet (40 mg) by mouth 2 times daily    Hyperkalemia, Edema, unspecified type       hydrALAZINE 25 MG tablet    APRESOLINE    60 tablet    Take 1 tablet (25 mg) by mouth 2 times daily    HTN (hypertension), Chronic kidney disease, stage 4 (severe) (H)       insulin glargine 100 UNIT/ML injection    LANTUS    15 mL    Inject 10 Units Subcutaneous every morning    Controlled type 2 diabetes mellitus with stage 4 chronic kidney disease, with long-term current use of insulin (H)       insulin pen needle 31G X 6 MM     120 each    Use as directed    Type 2 diabetes mellitus without complication (H)       levothyroxine 200 MCG tablet    SYNTHROID/LEVOTHROID    30 tablet    TAKE 1 TABLET BY MOUTH EVERY DAY    Other specified hypothyroidism       metoprolol succinate 25 MG 24 hr tablet    TOPROL-XL    90 tablet    Take 1 tablet (25 mg) by mouth daily    Hypertension associated with diabetes (H)       mometasone-formoterol 100-5 MCG/ACT oral inhaler    DULERA    1 Inhaler    Inhale 2 puffs into the lungs 2 times daily    COPD (chronic obstructive pulmonary disease) (H)       nicotine polacrilex 2 MG lozenge    COMMIT    100 lozenge    Dissolve 1 lozenge orally every 4 hours as directed.    Chronic  obstructive pulmonary disease, unspecified COPD type (H)       nitroGLYcerin 0.4 MG sublingual tablet    NITROSTAT    25 tablet    Place 1 tablet (0.4 mg) under the tongue every 5 minutes as needed for chest pain    History of MI (myocardial infarction)       nystatin 716897 UNIT/GM Powd    MYCOSTATIN    30 g    Apply 1 g topically 3 times daily as needed    Groin rash       * order for DME     1 Device    One wheeled walker with seat and brakes and basket    Risk for falls       * order for DME     2 Device    Equipment being ordered: two pairs moderate knee high support hose    Edema, unspecified type       * order for DME     2 each    Equipment being ordered: Compression socks. Strength:15-20 mmHg    Localized edema       oxyCODONE IR 10 MG tablet    ROXICODONE    90 tablet    Take 1 tablet (10 mg) by mouth every 8 hours as needed    Chronic low back pain without sciatica, unspecified back pain laterality       polyethylene glycol powder    MIRALAX    510 g    Take 17 g (1 capful) by mouth daily    Slow transit constipation       SM ALCOHOL PREP 70 % Pads     100 each    Externally apply 1 pad topically 4 times daily    Type 2 diabetes mellitus without complication, with long-term current use of insulin (H)       sodium bicarbonate 325 MG tablet     180 tablet    Take 2 tablets (650 mg) by mouth 3 times daily    Acidosis, CKD (chronic kidney disease) stage 4, GFR 15-29 ml/min (H)       tiotropium 18 MCG capsule    SPIRIVA HANDIHALER    90 capsule    Inhale contents of one capsule daily.    Pulmonary emphysema, unspecified emphysema type (H)       vitamin D 2000 UNITS tablet     60 tablet    Take 2,000 Units by mouth daily    Vitamin D deficiency disease       * Notice:  This list has 3 medication(s) that are the same as other medications prescribed for you. Read the directions carefully, and ask your doctor or other care provider to review them with you.

## 2018-02-28 ENCOUNTER — TELEPHONE (OUTPATIENT)
Dept: FAMILY MEDICINE | Facility: CLINIC | Age: 73
End: 2018-02-28

## 2018-02-28 ENCOUNTER — CARE COORDINATION (OUTPATIENT)
Dept: GERIATRIC MEDICINE | Facility: CLINIC | Age: 73
End: 2018-02-28

## 2018-02-28 DIAGNOSIS — J44.9 COPD (CHRONIC OBSTRUCTIVE PULMONARY DISEASE) (H): ICD-10-CM

## 2018-02-28 DIAGNOSIS — M54.50 CHRONIC LOW BACK PAIN WITHOUT SCIATICA, UNSPECIFIED BACK PAIN LATERALITY: ICD-10-CM

## 2018-02-28 DIAGNOSIS — G89.29 CHRONIC LOW BACK PAIN WITHOUT SCIATICA, UNSPECIFIED BACK PAIN LATERALITY: ICD-10-CM

## 2018-02-28 NOTE — PROGRESS NOTES
Arranged transportation thru [facility] for the below appt:  Appt Date & Time: 3/01 @ 12:30 pm  Clinic Name & Address:  [facility]  [address] [place] [address]  Transportation Provider: [employer]   time:  11:30    Arranged transportation thru [facility] for the below appt:  Appt Date & Time: 3/02 @ 11:00 am  Clinic Name & Address:  [facility] [address] [address]  Transportation Provider: [employer]   time:  10-10:30 am    Notified Kim of  time.    Brianna Barrios  Case Management Specialist  [facility]   [phone]

## 2018-02-28 NOTE — PATIENT INSTRUCTIONS
You were seen at the [facility] clinic today.  You saw Dr. Griffin  Here are your Instructions:    1.  Dobutamine stress echo.      Emma Akbar RN  Nurse Care Coordinator  Office:  [phone] option #1, then #3 & ask for Emma (nurse line)  Fax:  [phone]  After Hours:  [phone]  Appointments:  [phone] option #1, then option #1

## 2018-02-28 NOTE — TELEPHONE ENCOUNTER
"Controlled Substance Refill Request for Oxycodone    Last refill: 1/31/18, 90 tablets, 30 day supply     Last clinic visit: 2/12/18     Next appt:3/12/18    Controlled substance agreement on file: Yes:  Date 2/16/17.    Documentation in problem list reviewed:  Yes    Processing:  Patient will  in clinic    RX [place] reviewed:  reviewed- pt should have 3 days of medication left  [place]  [email or web address]-ph.[email or web address]      Called pt to follow up as pt should have 3 days of medication left. Patient staes she is out of medication, advised she should have 9 tablets left at least. Pt states \"when my pain is real bad I take 1.5 tablets so I took an extra half tablets sometime.\"   Forwarding to PCP to reviewed and advise, POD unwilling to refill medication at this time as pt should have medication remaining.  Helga Marquis RN     "

## 2018-02-28 NOTE — TELEPHONE ENCOUNTER
Reason for Call:  Medication or medication refill:    Do you use a [facility]?  Name of the pharmacy and phone number for the current request:  [facility] - [phone]    Name of the medication requested: oxycodone    Other request: Patient said that she called in request 2 days ago.  See no record of this.  She stated that she called the pharmacy.  Patient is now OUT of oxycodone    Can we leave a detailed message on this number? YES    Phone number patient can be reached at: Home number on file [phone] (home)    Best Time: anytime    Call taken on 2/28/2018 at 10:19 AM by Kiley Amaya

## 2018-03-01 RX ORDER — OXYCODONE HYDROCHLORIDE 10 MG/1
10 TABLET ORAL EVERY 8 HOURS PRN
Qty: 90 TABLET | Refills: 0 | Status: SHIPPED | OUTPATIENT
Start: 2018-03-02 | End: 2018-03-30

## 2018-03-01 NOTE — PROGRESS NOTES
Member called CMS to inform her that her ride did not show up today for her 3/01 appointment to the [facility]. Per [employer], there was not a ride scheduled in their system. CMS called [employer] and asked them to look into what happened, and to also confirm this member's rides for future dates. VIPIN called [employer] and informed CMS that today's no-show was due to human error - [employer] did not manually enter that particular ride into their system. CMS called member back to inform her of this and asked member to re-schedule the appointment, and let CMS know new appointment date and time, and CMS will re-schedule the ride. CMS also confirmed with member that future rides were double-checked, and that there should be no further issues.    Brianna Barrios  Case Management Specialist  [facility]   [phone]

## 2018-03-02 ENCOUNTER — HOSPITAL ENCOUNTER (OUTPATIENT)
Dept: CARDIOLOGY | Facility: CLINIC | Age: 73
Discharge: HOME | End: 2018-03-02
Attending: INTERNAL MEDICINE | Admitting: INTERNAL MEDICINE
Payer: COMMERCIAL

## 2018-03-02 DIAGNOSIS — Z87.891 HISTORY OF TOBACCO USE: ICD-10-CM

## 2018-03-02 DIAGNOSIS — I73.9 PVD (PERIPHERAL VASCULAR DISEASE) (H): ICD-10-CM

## 2018-03-02 DIAGNOSIS — N18.4 CHRONIC RENAL FAILURE, STAGE 4 (SEVERE) (H): ICD-10-CM

## 2018-03-02 DIAGNOSIS — E11.9 DIABETES MELLITUS, TYPE 2 (H): ICD-10-CM

## 2018-03-02 DIAGNOSIS — Z76.82 ORGAN TRANSPLANT CANDIDATE: ICD-10-CM

## 2018-03-02 DIAGNOSIS — I10 ESSENTIAL HYPERTENSION: ICD-10-CM

## 2018-03-02 PROCEDURE — 93321 DOPPLER ECHO F-UP/LMTD STD: CPT | Mod: TC

## 2018-03-02 PROCEDURE — 93325 DOPPLER ECHO COLOR FLOW MAPG: CPT | Mod: 26 | Performed by: INTERNAL MEDICINE

## 2018-03-02 PROCEDURE — 93350 STRESS TTE ONLY: CPT | Mod: 26 | Performed by: INTERNAL MEDICINE

## 2018-03-02 PROCEDURE — 25000125 ZZHC RX 250: Performed by: INTERNAL MEDICINE

## 2018-03-02 PROCEDURE — 25000128 H RX IP 250 OP 636: Performed by: INTERNAL MEDICINE

## 2018-03-02 PROCEDURE — 93016 CV STRESS TEST SUPVJ ONLY: CPT | Mod: GC | Performed by: INTERNAL MEDICINE

## 2018-03-02 PROCEDURE — 93321 DOPPLER ECHO F-UP/LMTD STD: CPT | Mod: 26 | Performed by: INTERNAL MEDICINE

## 2018-03-02 PROCEDURE — 25500064 ZZH RX 255 OP 636: Performed by: INTERNAL MEDICINE

## 2018-03-02 PROCEDURE — 93018 CV STRESS TEST I&R ONLY: CPT | Mod: GC | Performed by: INTERNAL MEDICINE

## 2018-03-02 RX ORDER — METOPROLOL TARTRATE 1 MG/ML
1-30 INJECTION, SOLUTION INTRAVENOUS
Status: DISPENSED | OUTPATIENT
Start: 2018-03-02 | End: 2018-03-02

## 2018-03-02 RX ORDER — DOBUTAMINE HYDROCHLORIDE 200 MG/100ML
10-50 INJECTION INTRAVENOUS CONTINUOUS
Status: ACTIVE | OUTPATIENT
Start: 2018-03-02 | End: 2018-03-02

## 2018-03-02 RX ADMIN — DOBUTAMINE IN DEXTROSE 30 MCG/KG/MIN: 200 INJECTION, SOLUTION INTRAVENOUS at 11:37

## 2018-03-02 RX ADMIN — PERFLUTREN 9 ML: 6.52 INJECTION, SUSPENSION INTRAVENOUS at 12:05

## 2018-03-02 RX ADMIN — METOPROLOL TARTRATE 3 MG: 5 INJECTION INTRAVENOUS at 11:54

## 2018-03-02 NOTE — LETTER
2018       TO: Kim Chavez   [address] [facility] [address]       Dear Ms. Chavez,    The results of your recent echocardiogram.    Results for orders placed or performed during the hospital encounter of 18   Echo  stress test with definity    Narrative    [id number]  [id number]  [id number]  [id number]^KISHA^FERNANDO^LANA           [facility]  Echocardiography Laboratory  [address] [place] [address]     Name: KIM CHAVEZ  MRN: [id number]  : 1945  Study Date: 2018 11:35 AM  Age: 72 yrs  Gender: Female  [facility]  Reason For Study: Chronic renal failure  History: Chronic renal failure  Ordering Physician: FERNANDO BEARD  Referring Physician: FERNANDO BEARD  Performed By: Oneida Hercules RDCS     BSA: 1.9 m2  Height: 66 in  Weight: 168 lb  HR: 63  BP: 111/62 mmHg  _____________________________________________________________________________  __     _____________________________________________________________________________  __        Interpretation Summary  Normal dobutamine stress echocardiogram  The target heart rate was achieved.  Normal biventricular size and global systolic function at baseline, LVEF=60-  65%. Left ventricular hypertrophy present.  With dobutamine, LVEF increased to >70% and LV cavity size decreased  appropriately.  No regional wall motion abnormalities at rest or with dobutamine.  No angina was elicited.  1mm upsloping ST segment depression in inferior and anterolateral leads with  stress, resolved during recovery. Patient also had SVT HR 150s with stress  that resolved with vagal maneuver and IV metoprolol.  Normal heart rate response to dobutamine.  Blood pressure response can't be assessed since it was checked from left arm  where she is known to have left subclavian stenosis.  No significant valvular abnormalities are noted on screening Doppler  exam.  _____________________________________________________________________________  __     Stress  The drug infusion was stopped due to target heart rate achieved.  The patient did not exhibit any symptoms during drug infusion.  The maximum dose of dobutamine was 50mcg/kg/min.  The maximum dose of metoprolol was 3.0mg.  Definity (NDC #69948-679-34) given intravenously.  Patient was given 9ml mixture of 1.5ml Definity and 8.5ml saline.  1 ml wasted.  Definity Expiration 19 .  Definity Lot # 4726 .  Hypotensive BP response to drug infusion.  Target Heart Rate was achieved.     Baseline  The patient is in normal sinus rhythm.     Stress Results        Protocol:  . Stress Echo        Maximum Predicted HR:   148 bpm        Target HR: 126 bpm                 % Maximum Predicted HR: 88 %                             Stage  DurationHeart Rate  BP                                 (mm:ss)   (bpm)                         Baseline  0:00      63    111/62                           Peak    6:11     130    78/43                            Stress Duration:   6:11 mm:ss *                      Maximum Stress HR: 130 bpm *     Left Ventricle  The left ventricle is normal in size. The left ventricular ejection fraction  is normal.     Right Ventricle  The right ventricle is normal in size and function.     Atria  The left atrium is not well visualized. Right atrium not well visualized.        Mitral Valve  The mitral valve leaflets appear normal. There is no evidence of stenosis,  fluttering, or prolapse. There is trace mitral regurgitation.     Tricuspid Valve  Normal tricuspid valve. There is mild (1+) tricuspid regurgitation.     Aortic Valve  The aortic valve is not well visualized. AoV function by doppler is normal.     Pulmonic Valve  The pulmonic valve is not well seen, but is grossly normal.     Vessels  The aortic root is not well visualized. Inferior vena cava not well visualized  for estimation of right atrial  pressure. Pulmonary arteries not well  visualized.        Pericardium  There is no pericardial effusion.     Procedure  Dobutamine Echo Complete. Contrast Definity.     Attestation  I was present and supervised the stress test. I personally viewed the imaging  and agree with the interpretation and report as documented by the fellow,  Andrés Verde.  _____________________________________________________________________________  __                                Report approved by: Anthony OROZCO 03/02/2018 02:35 PM                    _____________________________________________________________________________  __        *Note: Due to a large number of results and/or encounters for the requested time period, some results have not been displayed. A complete set of results can be found in Results Review.                    Your dobutamine echo shows no evidence of ischemia (lack of blood supply to your heart muscle). You also don't have a significant dynamic LV outflow tract gradient post-dobutmine. This further suggests that you do not have HCM/HOCM and that your LV hypertrophy is related to hypertension/CKD (high blood pressure & kidney disease). No further preop cardiac workup is needed at this time.             Sincerely,    Dr. Griffin/Emma RIVERA  [facility]

## 2018-03-02 NOTE — PROGRESS NOTES
Pt here for dobutamine stress test.  Test, meds and side effects reviewed with patient.  Achieved target HR at 50 mcg/kg/min Dobutamine. Pt became hypotensive at peak HR so Dobutamine infusion was stopped. Pt went into SVT/AT during recovery, HR 180s. Returned to SR after vagal maneuvers and a total of 3 mg IV Metoprolol. BP improved to 90s/50s on L arm during recovery. Pt asymptomatic. Rechecked BP on R arm, sbp 130s-140s. Pt escorted out to the gold waiting room.

## 2018-03-05 ENCOUNTER — OFFICE VISIT (OUTPATIENT)
Dept: PHARMACY | Facility: CLINIC | Age: 73
End: 2018-03-05
Payer: COMMERCIAL

## 2018-03-05 VITALS — SYSTOLIC BLOOD PRESSURE: 172 MMHG | HEART RATE: 60 BPM | DIASTOLIC BLOOD PRESSURE: 79 MMHG

## 2018-03-05 DIAGNOSIS — Z71.6 ENCOUNTER FOR SMOKING CESSATION COUNSELING: ICD-10-CM

## 2018-03-05 DIAGNOSIS — I15.1 HYPERTENSION SECONDARY TO OTHER RENAL DISORDERS: Primary | ICD-10-CM

## 2018-03-05 DIAGNOSIS — J44.9 CHRONIC OBSTRUCTIVE PULMONARY DISEASE, UNSPECIFIED COPD TYPE (H): ICD-10-CM

## 2018-03-05 PROCEDURE — 99606 MTMS BY PHARM EST 15 MIN: CPT | Performed by: PHARMACIST
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
## 2018-03-05 NOTE — PROGRESS NOTES
SUBJECTIVE/OBJECTIVE:                           Kim Anne is a 72 year old female coming in for a follow up visit for Medication Therapy Management.  She was referred to me from MATT Montiel for HTN.     Chief Complaint: HTN, smoking cessation    Allergies/ADRs: Reviewed in Epic  Tobacco: 0-1 pack per day - is interested in quittingTobacco Cessation Action Plan: Medication Therapy Management  (Referral to MTM)  Alcohol: not currently using  Caffeine: 1 cups/day of coffee, 4 cups of green tea daily  Activity: Has a pinched nerve in her back, sometimes she loses feelings in her feet.   PMH: Reviewed in Epic    Medication Adherence/Access  The patient misses their medication 0-2 times per week. Pt uses a pill box. Her grand daughter reminds her to take her meds. Since last visit she has started taking medications twice daily rather than all at once in the morning.  She leaves her meds in the kitchen where she sees them. She states she takes them a bit later some days, but does not forget overall.   The patient fills general medications at [facility] (on file).   Has the patient been offered to fill at [facility]? Yes    Smoking Cessation:  How much does she smoke:  Reduced to 2 cigarettes daily- down from 6-8 cigarettes daily. Her family is staying with her, and still giving her cigarettes. She spoke with them about it already, but to no avail. They smoke inside all the time. Nicotine lozenges were ordered for her last time, but her pharmacy did not fill them. Apparently to fill the NRT she first has to meet with the pharmacist for a smoking cessation appointment.   Why does she smoke: Stress reliever   Triggers for smoking include:  Stress, influenced by peers/family members  How long has she been smoking:  About 40 years  What is the most she ever smoked:  10-12 cigarettes daily  Tried quitting in the past: Yes.  How many times:  6-7.  For how long: 3 months.  What worked/didn t  work in the past: Cold turkey worked in the past.  Tried Nicotine patches, but continued smoking with use.   Why does she want to quit (motivators for quitting): avoid the unwanted smell of smoke and bad breath, granddaughter has asthma, help lower blood pressure, increase quality of life    Hypertension/Edema: Current medications include Metoprolol ER 25mg daily, Amlodipine 10mg daily, Hydralazine 25mg BID daily.  Patient reports the following medication side effects: edema occurs in the afternoon/evening, returns to normal in the morning.  She has been checking BP infrequently. States her BPs have running high before she takes her medications in the morning, 170s. Last home check was on Friday. It was decided not to use Veltassa.     COPD: Current medications: Spiriva daily.   Pt states she has started taking Spiriva consistently per our last visit instruction. No issues or exacerbations.  Pt is not experiencing side effects.   Pt reports the following symptoms: none.  Pt does have an COPD Action Plan on file.     Current labs include:  BP Readings from Last 3 Encounters:   02/27/18 133/67   02/16/18 182/73   02/12/18 120/60     Today's Vitals: There were no vitals taken for this visit.  Lab Results   Component Value Date    A1C 6.6 10/25/2017   .  Lab Results   Component Value Date    CHOL 214 10/25/2017     Lab Results   Component Value Date    TRIG 212 10/25/2017     Lab Results   Component Value Date    HDL 67 10/25/2017     Lab Results   Component Value Date     10/25/2017       Liver Function Studies -   Recent Labs   Lab Test  12/15/17   0942  10/25/17   0639   PROTTOTAL   --   5.8*   ALBUMIN  2.0*  1.9*   BILITOTAL   --   0.2   ALKPHOS   --   164*   AST   --   13   ALT   --   16       Lab Results   Component Value Date    UCRR 129 02/16/2018    MICROL 5320 07/07/2017    UMALCR 6325.80 (H) 07/07/2017       Last Basic Metabolic Panel:  Lab Results   Component Value Date     12/22/2017      Lab  Results   Component Value Date    POTASSIUM 5.7 12/22/2017     Lab Results   Component Value Date    CHLORIDE 117 12/22/2017     Lab Results   Component Value Date    BUN 37 12/22/2017     Lab Results   Component Value Date    CR 3.17 12/22/2017     GFR Estimate   Date Value Ref Range Status   02/16/2018 15 (L) >60 mL/min/1.7m2 Final     Comment:     Non  GFR Calc   01/16/2018 14 (L) >60 mL/min/1.7m2 Final     Comment:     Non  GFR Calc   01/08/2018 15 (L) >60 mL/min/1.7m2 Final     Comment:     Non  GFR Calc     TSH   Date Value Ref Range Status   10/20/2017 1.04 0.40 - 4.00 mU/L Final     Most Recent Immunizations   Administered Date(s) Administered     HepB 06/28/2012     Influenza (High Dose) 3 valent vaccine 09/28/2017     Influenza (IIV3) PF 11/04/2014     Pneumo Conj 13-V (2010&after) 01/05/2015     Pneumococcal 23 valent 04/01/2011     TD (ADULT, 7+) 07/12/2007     TDAP Vaccine (Adacel) 05/18/2011     Twinrix A/B 03/21/2012     Zoster vaccine, live 04/30/2012       ASSESSMENT:                               Medication Adherence: Pt states she is taking medications daily and does not miss doses frequently, but pt is a poor historian.  Pt believes she is having a colonoscopy on Monday, but I do not see an appointment listed, just one with her PCP. Pt to call her PCP and discuss.     Smoking Cessation: Improving. Pt must meet with her dispensing pharmacist before they will fill her Nicotine Lozenges. I offered to fill these downstairs at [facility], but patient prefers to meet with her dispensing pharmacist. She has steadily been reducing her total cigarettes per day and would benefit from having another discussion with her family about smoking inside the house.    Hypertension/Edema: Needs improvement. BP not at goal <130/80 per ACC/AHA guidelines. Pt has not yet taken her BP medications this morning which could explain her high value. Educated patient on always  taking her morning meds even before clinic visits.     COPD: Improved. Pt has consistently been taking Spiriva again.     PLAN:                            Pt to...  1. Make an appointment with your pharmacist to get your nicotine lozenges filled.   2. Call your primary care clinic and ask when your colonoscopy is scheduled for. I do not think it is scheduled as of yet.   3. Talk to your family about you quitting smoking. If they are going to smoke while living with you, ask that they do it outside to reduce your temptation.   4. Keep checking your blood pressures at home. Write down the numbers and times in a notebook. Be sure to take your blood pressure medications before coming into clinic. Your blood pressure goal is less than 130/80.     I spent 45 minutes with this patient today. I offer these suggestions for consideration by the nephrology. A copy of the visit note was provided to the patient's renal care provider.     Will follow up with patient in 1 month    The patient was given a summary of these recommendations as an after visit summary.     Denton Yuen, PharmD  [facility] Pharmacist    Phone: [phone]

## 2018-03-05 NOTE — Clinical Note
Marcelina Nguyen,  Pt's BP was elevated today, but she did not take her medications this morning. I will see her next month and check again. She has improved her adherence and has been taking her medications twice daily instead of all at once in the morning.  Considerations in the future: Changing Metoprolol to Carvedilol, increasing Hydralazine.   Thanks! No action needed currently.

## 2018-03-05 NOTE — MR AVS SNAPSHOT
After Visit Summary   3/5/2018    Kim Anne    MRN: [id number]           Patient Information     Date Of Birth          1945        Visit Information        Provider Department      3/5/2018 10:30 [facility]        Care Instructions    Recommendations from today's MT visit:                                                      1. Make an appointment with your pharmacist to get your nicotine lozenges filled.     2. Call your primary care clinic and ask when your colonoscopy is scheduled for. I do not think it is scheduled as of yet.     3. Talk to your family about you quitting smoking. If they are going to smoke while living with you, ask that they do it outside to reduce your temptation.     4. Keep checking your blood pressures at home. Write down the numbers and times in a notebook. Be sure to take your blood pressure medications before coming into clinic. Your blood pressure goal is less than 130/80.     Next MTM visit: 4/2/18 at 11AM, take your medications before coming in.     To schedule another MTM appointment, please call the clinic directly or you may call the MTM scheduling line at [phone] or toll-free at [phone].     My Clinical Pharmacist's contact information:                                                      It was a pleasure seeing you today!  Please feel free to contact me with any questions or concerns you have.      Denton Yuen, PharmD  [facility] Pharmacist    Phone: [phone]     You may receive a survey about the [facility] services you received.  I would appreciate your feedback to help me serve you better in the future. Please fill it out and return it when you can. Your comments will be anonymous.              Follow-ups after your visit        Your next 10 appointments already scheduled     Mar 12, 2018 10:00 AM CDT   Return Visit with JUNIOR Bishop [facility]  Primary Care ([facility])    [address]  [address]  [facility] [address]   [phone]            Mar 12, 2018 10:00 AM CDT   Return Visit with LAMINE Chahal   [facility] ([facility])    [address]  [address]  [facility] [address]   [phone]            Mar 29, 2018  7:30 AM CDT   PFT VISIT with  PFL B   [facility] Pulmonary Function Testing ([facility])    [address]  3rd Floor  [facility] [address]   [phone]            Mar 29, 2018  8:00 AM CDT   (Arrive by 7:45 AM)   Return Visit with Margret Packer MD   [facility] ([facility])    [address]  [address]  [facility] [address]   [phone]            Apr 02, 2018 11:00 AM CDT   (Arrive by 10:45 AM)   SHORT with Denton Yuen [facility] Medication Therapy Management ([facility])    [address]  [address] [facility] [address]   [phone]              Who to contact     If you have questions or need follow up information about today's clinic visit or your schedule please contact [facility] MEDICATION THERAPY MANAGEMENT directly at [phone].  Normal or non-critical lab and imaging results will be communicated to you by MyChart, letter or phone within 4 business days after the clinic has received the results. If you do not hear from us within 7 days, please contact the clinic through [employer] or phone. If you have a critical or abnormal lab result, we will notify you by phone as soon as possible.  Submit refill requests through [employer] or call your pharmacy and they will forward the refill request to us. Please allow 3 business days for your refill to be completed.          Additional Information About Your Visit        MyChart Information     [employer] lets you send  "messages to your doctor, view your test results, renew your prescriptions, schedule appointments and more. To sign up, go to [email or web address] . Click on \"Log in\" on the left side of the screen, which will take you to the Welcome page. Then click on \"Sign up Now\" on the right side of the page.     You will be asked to enter the access code listed below, as well as some personal information. Please follow the directions to create your username and password.     Your access code is: [email or web address]  Expires: 6/3/2018 11:06 AM     Your access code will  in 90 days. If you need help or a new code, please call your [facility] clinic or [phone].        Care EveryWhere ID     This is your Care EveryWhere ID. This could be used by other organizations to access your [facility] medical records  [id number]         Blood Pressure from Last 3 Encounters:   18 133/67   18 182/73   18 120/60    Weight from Last 3 Encounters:   18 168 lb 14.4 oz (76.6 kg)   18 168 lb 9.6 oz (76.5 kg)   18 172 lb (78 kg)              Today, you had the following     No orders found for display         Today's Medication Changes          These changes are accurate as of 3/5/18 11:06 AM.  If you have any questions, ask your nurse or doctor.               These medicines have changed or have updated prescriptions.        Dose/Directions    furosemide 40 MG tablet   Commonly known as:  LASIX   This may have changed:    - how much to take  - when to take this   Used for:  Hyperkalemia, Edema, unspecified type        Dose:  40 mg   Take 1 tablet (40 mg) by mouth 2 times daily   Quantity:  60 tablet   Refills:  3       hydrALAZINE 25 MG tablet   Commonly known as:  APRESOLINE   This may have changed:    - how much to take  - when to take this   Used for:  HTN (hypertension), Chronic kidney disease, stage 4 (severe) (H)        Dose:  25 mg   Take 1 tablet (25 mg) by mouth 2 times daily   Quantity:  60 tablet "   Refills:  1                Primary Care Provider Office Phone # Fax #    JUNIOR Bishop [phone]       [address] [facility] [address] [place] [address]        Equal Access to Services     CECIL TOLLIVER : Hadii alyhsa ku hadjao Soomaali, waaxda luqadaha, qaybta kaalmada adeegyada, kassandra desain laurenfermin pendleton christopher shoemaker. So [facility] [phone].    ATENCIÓN: Si habla español, tiene a bailey disposición servicios gratuitos de asistencia lingüística. Llame al [phone].    We comply with applicable federal civil rights laws and [place] laws. We do not discriminate on the basis of race, color, national origin, age, disability, sex, sexual orientation, or gender identity.            Thank you!     Thank you for choosing [facility] MEDICATION THERAPY MANAGEMENT  for your care. Our goal is always to provide you with excellent care. Hearing back from our patients is one way we can continue to improve our services. Please take a few minutes to complete the written survey that you may receive in the mail after your visit with us. Thank you!             Your Updated Medication List - Protect others around you: Learn how to safely use, store and throw away your medicines at www.disposemymeds.org.          This list is accurate as of 3/5/18 11:06 AM.  Always use your most recent med list.                   Brand Name Dispense Instructions for use Diagnosis    albuterol 108 (90 BASE) MCG/ACT Inhaler    PROAIR HFA/PROVENTIL HFA/VENTOLIN HFA    18 g    Inhale 2 puffs into the lungs every 6 hours as needed for shortness of breath / dyspnea or wheezing    Chronic obstructive pulmonary disease, unspecified COPD type (H)       amLODIPine 5 MG tablet    NORVASC    60 tablet    Take 2 tablets (10 mg) by mouth daily    Renovascular hypertension       ASPIR-LOW 81 MG EC tablet   Generic drug:  aspirin     120 tablet    TAKE 1 TABLET BY MOUTH EVERY DAY    History of MI (myocardial infarction), Essential hypertension,  benign       atorvastatin 40 MG tablet    LIPITOR    90 tablet    Take 1 tablet (40 mg) by mouth daily    Hyperlipidemia LDL goal <100       azithromycin 250 MG tablet    ZITHROMAX    6 tablet    Two tablets first day, then one tablet daily for four days.    Cough       blood glucose monitoring lancets     1 Box    Test BS four times daily as directed    Type 2 diabetes mellitus without complication, with long-term current use of insulin (H)       blood glucose monitoring test strip    FREESTYLE LITE    50 strip    TEST BLOOD SUGAR TWO TIMES A DAY    Type 2 diabetes mellitus without complication, with long-term current use of insulin (H)       docusate sodium 100 MG capsule    COLACE    60 capsule    Take 1 capsule (100 mg) by mouth 2 times daily    Constipation, unspecified constipation type       EUCERIN CALMING DAILY MOIST Crea     1 Tube    Externally apply 1 dose * topically daily    Type II or unspecified type diabetes mellitus with neurological manifestations, not stated as uncontrolled(250.60) (H)       ferrous sulfate 325 (65 FE) MG tablet    IRON    100 tablet    Take 1 tablet (325 mg) by mouth daily (with breakfast)    Anemia, iron deficiency       fluticasone 50 MCG/ACT spray    FLONASE    1 Bottle    Spray 1-2 sprays into both nostrils daily    Acute nasopharyngitis       furosemide 40 MG tablet    LASIX    60 tablet    Take 1 tablet (40 mg) by mouth 2 times daily    Hyperkalemia, Edema, unspecified type       hydrALAZINE 25 MG tablet    APRESOLINE    60 tablet    Take 1 tablet (25 mg) by mouth 2 times daily    HTN (hypertension), Chronic kidney disease, stage 4 (severe) (H)       insulin glargine 100 UNIT/ML injection    LANTUS    15 mL    Inject 10 Units Subcutaneous every morning    Controlled type 2 diabetes mellitus with stage 4 chronic kidney disease, with long-term current use of insulin (H)       insulin pen needle 31G X 6 MM     120 each    Use as directed    Type 2 diabetes mellitus without  complication (H)       levothyroxine 200 MCG tablet    SYNTHROID/LEVOTHROID    30 tablet    TAKE 1 TABLET BY MOUTH EVERY DAY    Other specified hypothyroidism       metoprolol succinate 25 MG 24 hr tablet    TOPROL-XL    90 tablet    Take 1 tablet (25 mg) by mouth daily    Hypertension associated with diabetes (H)       mometasone-formoterol 100-5 MCG/ACT oral inhaler    DULERA    1 Inhaler    Inhale 2 puffs into the lungs 2 times daily    COPD (chronic obstructive pulmonary disease) (H)       nicotine polacrilex 2 MG lozenge    COMMIT    100 lozenge    Dissolve 1 lozenge orally every 4 hours as directed.    Chronic obstructive pulmonary disease, unspecified COPD type (H)       nitroGLYcerin 0.4 MG sublingual tablet    NITROSTAT    25 tablet    Place 1 tablet (0.4 mg) under the tongue every 5 minutes as needed for chest pain    History of MI (myocardial infarction)       nystatin 653191 UNIT/GM Powd    MYCOSTATIN    30 g    Apply 1 g topically 3 times daily as needed    Groin rash       * order for DME     1 Device    One wheeled walker with seat and brakes and basket    Risk for falls       * order for DME     2 Device    Equipment being ordered: two pairs moderate knee high support hose    Edema, unspecified type       * order for DME     2 each    Equipment being ordered: Compression socks. Strength:15-20 mmHg    Localized edema       oxyCODONE IR 10 MG tablet    ROXICODONE    90 tablet    Take 1 tablet (10 mg) by mouth every 8 hours as needed    Chronic low back pain without sciatica, unspecified back pain laterality       polyethylene glycol powder    MIRALAX    510 g    Take 17 g (1 capful) by mouth daily    Slow transit constipation       SM ALCOHOL PREP 70 % Pads     100 each    Externally apply 1 pad topically 4 times daily    Type 2 diabetes mellitus without complication, with long-term current use of insulin (H)       sodium bicarbonate 325 MG tablet     180 tablet    Take 2 tablets (650 mg) by mouth 3  times daily    Acidosis, CKD (chronic kidney disease) stage 4, GFR 15-29 ml/min (H)       tiotropium 18 MCG capsule    SPIRIVA HANDIHALER    90 capsule    Inhale contents of one capsule daily.    Pulmonary emphysema, unspecified emphysema type (H)       vitamin D 2000 UNITS tablet     60 tablet    Take 2,000 Units by mouth daily    Vitamin D deficiency disease       * Notice:  This list has 3 medication(s) that are the same as other medications prescribed for you. Read the directions carefully, and ask your doctor or other care provider to review them with you.

## 2018-03-05 NOTE — PATIENT INSTRUCTIONS
Recommendations from today's MTM visit:                                                      1. Make an appointment with your pharmacist to get your nicotine lozenges filled.     2. Call your primary care clinic and ask when your colonoscopy is scheduled for. I do not think it is scheduled as of yet.     3. Talk to your family about you quitting smoking. If they are going to smoke while living with you, ask that they do it outside to reduce your temptation.     4. Keep checking your blood pressures at home. Write down the numbers and times in a notebook. Be sure to take your blood pressure medications before coming into clinic. Your blood pressure goal is less than 130/80.     Next MTM visit: 4/2/18 at 11AM, take your medications before coming in.     To schedule another MTM appointment, please call the clinic directly or you may call the MTM scheduling line at [phone] or toll-free at [phone].     My Clinical Pharmacist's contact information:                                                      It was a pleasure seeing you today!  Please feel free to contact me with any questions or concerns you have.      Denton Yuen, Sofía  MTM Pharmacist    Phone: [phone]     You may receive a survey about the MT services you received.  I would appreciate your feedback to help me serve you better in the future. Please fill it out and return it when you can. Your comments will be anonymous.

## 2018-03-06 ENCOUNTER — CARE COORDINATION (OUTPATIENT)
Dept: GERIATRIC MEDICINE | Facility: CLINIC | Age: 73
End: 2018-03-06

## 2018-03-06 NOTE — PROGRESS NOTES
Arranged transportation thru [facility] for the below appt:  Appt Date & Time: 3/08 @ 3:30 pm  Clinic Name & Address:  [place] [address]  Transportation Provider: [employer]   time:  2:30-3:00 pm    Arranged transportation thru [facility] for the below appt:  Appt Date & Time: 3/13 @ 10:15 am  Clinic Name & Address:  [facility] - [address], [place] [address]  Transportation Provider: [employer]   time:  9:15-9:45 am    Notified Kim of  time.    Brianna Barrios  Case Management Specialist  [facility]   [phone]

## 2018-03-08 ENCOUNTER — DOCUMENTATION ONLY (OUTPATIENT)
Dept: DENTISTRY | Facility: CLINIC | Age: 73
End: 2018-03-08

## 2018-03-08 NOTE — PROGRESS NOTES
Dental Service Clearance Letter  Kim Anne  is cleared for transplant from a dental standpoint after having received care at the [facility]. #22, #27, #28 only remaining teeth; no caries or pathology noted.  Noted periodontal involvement and recession.  She has well-fitting complete maxillary denture, has regular cleaning and exams at outside office, mandibular acrylic partial causes pain when inserting and patient reports never/very rarely wearing /P. Patient denies dental pain, facial swelling, fever, oral suppuration. No evidence of caries or imminent dental infection at this time. She has been given oral hygiene instructions and oral health management products to help with any potential adverse effects of her future treatments.  We will provide her with follow-up care to assist with any concerns that may arise.   Thank you for allowing us to participate in the comprehensive care of Kim Anne.  For any questions or concerns, please contact me or the dental care coordinator ([phone]).    Oseas Moran DDS   PGY 1  Pager: [phone]

## 2018-03-11 DIAGNOSIS — G89.29 CHRONIC LOW BACK PAIN WITHOUT SCIATICA, UNSPECIFIED BACK PAIN LATERALITY: ICD-10-CM

## 2018-03-11 DIAGNOSIS — M54.50 CHRONIC LOW BACK PAIN WITHOUT SCIATICA, UNSPECIFIED BACK PAIN LATERALITY: ICD-10-CM

## 2018-03-12 ENCOUNTER — OFFICE VISIT (OUTPATIENT)
Dept: FAMILY MEDICINE | Facility: CLINIC | Age: 73
End: 2018-03-12
Payer: COMMERCIAL

## 2018-03-12 ENCOUNTER — OFFICE VISIT (OUTPATIENT)
Dept: BEHAVIORAL HEALTH | Facility: CLINIC | Age: 73
End: 2018-03-12
Payer: COMMERCIAL

## 2018-03-12 VITALS
HEART RATE: 69 BPM | RESPIRATION RATE: 12 BRPM | OXYGEN SATURATION: 98 % | TEMPERATURE: 97.8 F | WEIGHT: 168 LBS | BODY MASS INDEX: 27.12 KG/M2 | SYSTOLIC BLOOD PRESSURE: 112 MMHG | DIASTOLIC BLOOD PRESSURE: 78 MMHG

## 2018-03-12 DIAGNOSIS — R22.9 LOCAL SUPERFICIAL SWELLING, MASS OR LUMP: ICD-10-CM

## 2018-03-12 DIAGNOSIS — Z79.4 TYPE 2 DIABETES MELLITUS WITH STAGE 4 CHRONIC KIDNEY DISEASE, WITH LONG-TERM CURRENT USE OF INSULIN (H): Primary | ICD-10-CM

## 2018-03-12 DIAGNOSIS — N18.4 TYPE 2 DIABETES MELLITUS WITH STAGE 4 CHRONIC KIDNEY DISEASE, WITH LONG-TERM CURRENT USE OF INSULIN (H): Primary | ICD-10-CM

## 2018-03-12 DIAGNOSIS — Z13.89 SCREENING FOR DIABETIC PERIPHERAL NEUROPATHY: ICD-10-CM

## 2018-03-12 DIAGNOSIS — E11.22 TYPE 2 DIABETES MELLITUS WITH STAGE 4 CHRONIC KIDNEY DISEASE, WITH LONG-TERM CURRENT USE OF INSULIN (H): Primary | ICD-10-CM

## 2018-03-12 DIAGNOSIS — F43.21 GRIEF: Primary | ICD-10-CM

## 2018-03-12 DIAGNOSIS — I10 HYPERTENSION GOAL BP (BLOOD PRESSURE) < 130/80: ICD-10-CM

## 2018-03-12 PROCEDURE — 99207 C FOOT EXAM  NO CHARGE: CPT | Performed by: NURSE PRACTITIONER

## 2018-03-12 PROCEDURE — 90832 PSYTX W PT 30 MINUTES: CPT | Performed by: SOCIAL WORKER

## 2018-03-12 PROCEDURE — 99214 OFFICE O/P EST MOD 30 MIN: CPT | Performed by: NURSE PRACTITIONER

## 2018-03-12 RX ORDER — OXYCODONE HYDROCHLORIDE 10 MG/1
10 TABLET ORAL EVERY 8 HOURS PRN
Qty: 90 TABLET | Refills: 0 | OUTPATIENT
Start: 2018-03-12

## 2018-03-12 ASSESSMENT — ANXIETY QUESTIONNAIRES
6. BECOMING EASILY ANNOYED OR IRRITABLE: NOT AT ALL
5. BEING SO RESTLESS THAT IT IS HARD TO SIT STILL: NOT AT ALL
3. WORRYING TOO MUCH ABOUT DIFFERENT THINGS: NOT AT ALL
7. FEELING AFRAID AS IF SOMETHING AWFUL MIGHT HAPPEN: NOT AT ALL
1. FEELING NERVOUS, ANXIOUS, OR ON EDGE: NOT AT ALL
IF YOU CHECKED OFF ANY PROBLEMS ON THIS QUESTIONNAIRE, HOW DIFFICULT HAVE THESE PROBLEMS MADE IT FOR YOU TO DO YOUR WORK, TAKE CARE OF THINGS AT HOME, OR GET ALONG WITH OTHER PEOPLE: NOT DIFFICULT AT ALL
2. NOT BEING ABLE TO STOP OR CONTROL WORRYING: NOT AT ALL
GAD7 TOTAL SCORE: 0

## 2018-03-12 ASSESSMENT — PATIENT HEALTH QUESTIONNAIRE - PHQ9: 5. POOR APPETITE OR OVEREATING: NOT AT ALL

## 2018-03-12 NOTE — NURSING NOTE
"Chief Complaint   Patient presents with     Diabetes     Hypertension       Initial /78 (BP Location: Right arm)  Pulse 69  Temp 97.8  F (36.6  C) (Oral)  Resp 12  Wt 168 lb (76.2 kg)  SpO2 98%  BMI 27.12 kg/m2 Estimated body mass index is 27.12 kg/(m^2) as calculated from the following:    Height as of 2/16/18: 5' 6\" (1.676 m).    Weight as of this encounter: 168 lb (76.2 kg).  Medication Reconciliation: complete     Carola Salamanca CMA      "

## 2018-03-12 NOTE — PATIENT INSTRUCTIONS
-Follow-instructions of bowel preparation for colonoscopy.  -Pelvis CT ordered to assess left gluteus lump.

## 2018-03-12 NOTE — MR AVS SNAPSHOT
After Visit Summary   3/12/2018    Kim Anne    MRN: [id number]           Patient Information     Date Of Birth          1945        Visit Information        Provider Department      3/12/2018 10:00 AM Ailyn Nieves APRN C[place] [facility] [place]'s Diagnoses     Screening for diabetic peripheral neuropathy    -  1    Local superficial swelling, mass or lump        Hypertension goal BP (blood pressure) < 130/80          Care Instructions    -Follow-instructions of bowel preparation for colonoscopy.  -Pelvis CT ordered to assess left gluteus lump.           Follow-ups after your visit        Your next 10 appointments already scheduled     Apr 02, 2018 11:00 AM CDT   (Arrive by 10:45 AM)   [facility] with [facility] ([facility])    [address] [place] [id number] [phone]              Future tests that were ordered for you today     Open Future Orders        Priority Expected Expires Ordered    CT Pelvis w/o Contrast Routine  3/12/2019 3/12/2018            Who to contact     If you have questions or need follow up information about today's clinic visit or your schedule please contact [facility] directly at [phone].  Normal or non-critical lab and imaging results will be communicated to you by MyChart, letter or phone within 4 business days after the clinic has received the results. If you do not hear from us within 7 days, please contact the clinic through MyChart or phone. If you have a critical or abnormal lab result, we will notify you by phone as soon as possible.  Submit refill requests through [employer] or call your pharmacy and they will forward the refill request to us. Please allow 3 business days for your refill to be completed.          Additional Information About Your Visit        MyChart Information   "   [employer] lets you send messages to your doctor, view your test results, renew your prescriptions, schedule appointments and more. To sign up, go to [email or web address] . Click on \"Log in\" on the left side of the screen, which will take you to the Welcome page. Then click on \"Sign up Now\" on the right side of the page.     You will be asked to enter the access code listed below, as well as some personal information. Please follow the directions to create your username and password.     Your access code is: [email or web address]  Expires: 6/3/2018 12:06 PM     Your access code will  in 90 days. If you need help or a new code, please call your [place] clinic or [phone].        Care EveryWhere ID     This is your Care EveryWhere ID. This could be used by other organizations to access your [place] medical records  [id number]        Your Vitals Were     Pulse Temperature Respirations Pulse Oximetry BMI (Body Mass Index)       69 97.8  F (36.6  C) (Oral) 12 98% 27.12 kg/m2        Blood Pressure from Last 3 Encounters:   18 112/78   18 172/79   18 133/67    Weight from Last 3 Encounters:   18 168 lb (76.2 kg)   18 168 lb 14.4 oz (76.6 kg)   18 168 lb 9.6 oz (76.5 kg)              We Performed the Following     FOOT EXAM  NO CHARGE [28126.114]          Today's Medication Changes          These changes are accurate as of 3/12/18 10:41 AM.  If you have any questions, ask your nurse or doctor.               These medicines have changed or have updated prescriptions.        Dose/Directions    furosemide 40 MG tablet   Commonly known as:  LASIX   This may have changed:    - how much to take  - when to take this   Used for:  Hyperkalemia, Edema, unspecified type        Dose:  40 mg   Take 1 tablet (40 mg) by mouth 2 times daily   Quantity:  60 tablet   Refills:  3       hydrALAZINE 25 MG tablet   Commonly known as:  APRESOLINE   This may have changed:    - how much to take  - when to " take this   Used for:  HTN (hypertension), Chronic kidney disease, stage 4 (severe) (H)        Dose:  25 mg   Take 1 tablet (25 mg) by mouth 2 times daily   Quantity:  60 tablet   Refills:  1                Primary Care Provider Office Phone # Fax #    JUNIOR Bishop [phone]       [address] [facility] [address]  [facility] [address]        Equal Access to Services     CECIL TOLLIVER : Hadii aad ku hadasho Soomaali, waaxda luqadaha, qaybta kaalmada adeegyada, waxay idiin hayaan adeeg keerthi laaudrey . So [facility] [phone].    ATENCIÓN: Si habla espyadira, tiene a bailey disposición servicios gratuitos de asistencia lingüística. Llame al [phone].    We comply with applicable federal civil rights laws and [place] laws. We do not discriminate on the basis of race, color, national origin, age, disability, sex, sexual orientation, or gender identity.            Thank you!     Thank you for choosing [facility]  for your care. Our goal is always to provide you with excellent care. Hearing back from our patients is one way we can continue to improve our services. Please take a few minutes to complete the written survey that you may receive in the mail after your visit with us. Thank you!             Your Updated Medication List - Protect others around you: Learn how to safely use, store and throw away your medicines at www.disposemymeds.org.          This list is accurate as of 3/12/18 10:41 AM.  Always use your most recent med list.                   Brand Name Dispense Instructions for use Diagnosis    albuterol 108 (90 BASE) MCG/ACT Inhaler    PROAIR HFA/PROVENTIL HFA/VENTOLIN HFA    18 g    Inhale 2 puffs into the lungs every 6 hours as needed for shortness of breath / dyspnea or wheezing    Chronic obstructive pulmonary disease, unspecified COPD type (H)       amLODIPine 5 MG tablet    NORVASC    60 tablet    Take 2 tablets (10 mg) by mouth daily    Renovascular  hypertension       ASPIR-LOW 81 MG EC tablet   Generic drug:  aspirin     120 tablet    TAKE 1 TABLET BY MOUTH EVERY DAY    History of MI (myocardial infarction), Essential hypertension, benign       atorvastatin 40 MG tablet    LIPITOR    90 tablet    Take 1 tablet (40 mg) by mouth daily    Hyperlipidemia LDL goal <100       blood glucose monitoring lancets     1 Box    Test BS four times daily as directed    Type 2 diabetes mellitus without complication, with long-term current use of insulin (H)       blood glucose monitoring test strip    FREESTYLE LITE    50 strip    TEST BLOOD SUGAR TWO TIMES A DAY    Type 2 diabetes mellitus without complication, with long-term current use of insulin (H)       docusate sodium 100 MG capsule    COLACE    60 capsule    Take 1 capsule (100 mg) by mouth 2 times daily    Constipation, unspecified constipation type       EUCERIN CALMING DAILY MOIST Crea     1 Tube    Externally apply 1 dose * topically daily    Type II or unspecified type diabetes mellitus with neurological manifestations, not stated as uncontrolled(250.60) (H)       ferrous sulfate 325 (65 FE) MG tablet    IRON    100 tablet    Take 1 tablet (325 mg) by mouth daily (with breakfast)    Anemia, iron deficiency       fluticasone 50 MCG/ACT spray    FLONASE    1 Bottle    Spray 1-2 sprays into both nostrils daily    Acute nasopharyngitis       furosemide 40 MG tablet    LASIX    60 tablet    Take 1 tablet (40 mg) by mouth 2 times daily    Hyperkalemia, Edema, unspecified type       hydrALAZINE 25 MG tablet    APRESOLINE    60 tablet    Take 1 tablet (25 mg) by mouth 2 times daily    HTN (hypertension), Chronic kidney disease, stage 4 (severe) (H)       insulin glargine 100 UNIT/ML injection    LANTUS    15 mL    Inject 10 Units Subcutaneous every morning    Controlled type 2 diabetes mellitus with stage 4 chronic kidney disease, with long-term current use of insulin (H)       insulin pen needle 31G X 6 MM     120 each     Use as directed    Type 2 diabetes mellitus without complication (H)       levothyroxine 200 MCG tablet    SYNTHROID/LEVOTHROID    30 tablet    TAKE 1 TABLET BY MOUTH EVERY DAY    Other specified hypothyroidism       metoprolol succinate 25 MG 24 hr tablet    TOPROL-XL    90 tablet    Take 1 tablet (25 mg) by mouth daily    Hypertension associated with diabetes (H)       mometasone-formoterol 100-5 MCG/ACT oral inhaler    DULERA    1 Inhaler    Inhale 2 puffs into the lungs 2 times daily    COPD (chronic obstructive pulmonary disease) (H)       nicotine polacrilex 2 MG lozenge    COMMIT    100 lozenge    Dissolve 1 lozenge orally every 4 hours as directed.    Chronic obstructive pulmonary disease, unspecified COPD type (H)       nitroGLYcerin 0.4 MG sublingual tablet    NITROSTAT    25 tablet    Place 1 tablet (0.4 mg) under the tongue every 5 minutes as needed for chest pain    History of MI (myocardial infarction)       nystatin [id number] UNIT/GM Powd    MYCOSTATIN    30 g    Apply 1 g topically 3 times daily as needed    Groin rash       * order for DME     1 Device    One wheeled walker with seat and brakes and basket    Risk for falls       * order for DME     2 Device    Equipment being ordered: two pairs moderate knee high support hose    Edema, unspecified type       * order for DME     2 each    Equipment being ordered: Compression socks. Strength:15-20 mmHg    Localized edema       oxyCODONE IR 10 MG tablet    ROXICODONE    90 tablet    Take 1 tablet (10 mg) by mouth every 8 hours as needed    Chronic low back pain without sciatica, unspecified back pain laterality       polyethylene glycol powder    MIRALAX    510 g    Take 17 g (1 capful) by mouth daily    Slow transit constipation       SM ALCOHOL PREP 70 % Pads     100 each    Externally apply 1 pad topically 4 times daily    Type 2 diabetes mellitus without complication, with long-term current use of insulin (H)       sodium bicarbonate 325 MG tablet      180 tablet    Take 2 tablets (650 mg) by mouth 3 times daily    Acidosis, CKD (chronic kidney disease) stage 4, GFR 15-29 ml/min (H)       tiotropium 18 MCG capsule    SPIRIVA HANDIHALER    90 capsule    Inhale contents of one capsule daily.    Pulmonary emphysema, unspecified emphysema type (H)       vitamin D 2000 UNITS tablet     60 tablet    Take 2,000 Units by mouth daily    Vitamin D deficiency disease       * Notice:  This list has 3 medication(s) that are the same as other medications prescribed for you. Read the directions carefully, and ask your doctor or other care provider to review them with you.

## 2018-03-12 NOTE — PROGRESS NOTES
"[facility] - Integrated Primary Care   March 12, 2018      Behavioral Health Clinician Progress Note    Patient Name: Kim Anne           Service Type: Individual      Service Location:   Face to Face in Clinic     Session Start Time: 10:05am  Session End Time: 10:30am      Session Length: 16 - 37      Attendees: Patient and PCP    Visit Activities (Refresh list every visit): [facility]    Diagnostic Assessment Date: TBD  Treatment Plan Review Date: TBD  See Flowsheets for today's PHQ-9 and JUSTINE-7 results  Previous PHQ-9:   PHQ-9 SCORE 2/14/2017 2/12/2018 3/12/2018   Total Score - - -   Total Score - - -   Total Score 1 0 0     Previous JUSTINE-7:   JUSTINE-7 SCORE 12/2/2016 12/12/2016 3/12/2018   Total Score - - -   Total Score 0 0 0   Total Score - - -       NAE LEVEL:  NAE Score (Last Two) 2/18/2013 5/23/2014   NAE Raw Score 48 45   Activation Score 80 73.1   NAE Level 4 4       DATA  Extended Session (60+ minutes): No  Interactive Complexity: No  Crisis: No    Treatment Objective(s) Addressed in This Session:  Target Behavior(s): disease management/lifestyle changes manage diabetes better    Grief / Loss: will process grief/loss issues in an adaptive manner  Psychological distress related to Diabetes    Current Stressors / Issues:  [facility] met with patient at PCP request to assess current behavioral health needs and provide information and support as necessary.  Pt reported that she has been doing well. Pt reported that she has her colonoscopy set up and also completed her echo. Pt denied any depression or anxiety. She stated that her mental health has been stable as well as her sleep. Pt stated that she is still living with her son and her grandson. When asked how it was going she asked \"why\"? Writer explained how in the past she stated that she had a lot of stress in terms of her son and his health. She stated that now it is going well. Pt reported that the appointments with her transplant team have been " going well. Pt stated that she also has a list of foods she should be eating to help control her potassium. Pt was anxious regarding her colonoscopy tomorrow so her PCP went through all of the instructions for the procedure. Writer informed pt that she can call the clinic if she has any questions and we can help walk her through it.    Reviewed new and ongoing stressors  Reviewed mental health symptoms and appropriate coping mechanisms    I affirmed the steps this patient has taken to address physical and behavioral health issues, and offered continued behavioral health services or referral, now or in the future, as needed by the patient.    Progress on Treatment Objective(s) / Homework:  Satisfactory progress - CONTEMPLATION (Considering change and yet undecided); Intervened by assessing the negative and positive thinking (ambivalence) about behavior change    Motivational Interviewing    MI Intervention: Expressed Empathy/Understanding, Supported Autonomy, Collaboration, Evocation, Open-ended questions and Reflections: simple and complex     Change Talk Expressed by the Patient: Committment to change Activation Taking steps    Provider Response to Change Talk: E - Evoked more info from patient about behavior change, A - Affirmed patient's thoughts, decisions, or attempts at behavior change, R - Reflected patient's change talk and S - Summarized patient's change talk statements      Care Plan review completed: No    Medication Review:  No changes to current psychiatric medication(s)    Medication Compliance:  Yes    Changes in Health Issues:   None reported    Chemical Use Review:   Substance Use: Chemical use reviewed, no active concerns identified      Tobacco Use: Yes, increase.  Client reports frequency of use 6 cigarettes daily. Reviewed information and resources for quitting  Reviewed options for assistance and intervention  Provided encouragement to quit     Assessment: Current Emotional / Mental Status  (status of significant symptoms):  Risk status (Self / Other harm or suicidal ideation)  Patient denies a history of suicidal ideation, suicide attempts, self-injurious behavior, homicidal ideation, homicidal behavior and and other safety concerns  Patient denies current fears or concerns for personal safety.  Patient denies current or recent suicidal ideation or behaviors.  Patient denies current or recent homicidal ideation or behaviors.  Patient denies current or recent self injurious behavior or ideation.  Patient denies other safety concerns.  A safety and risk management plan has not been developed at this time, however patient was encouraged to call [place] [phone] should there be a change in any of these risk factors.    Appearance:   Appropriate   Eye Contact:   Fair   Psychomotor Behavior: Normal   Attitude:   Guarded   Orientation:   All  Speech   Rate / Production: Impoverished  Monotone    Volume:  Normal   Mood:    Normal  Affect:    Blunted  Flat   Thought Content:  Clear   Thought Form:  Logical   Insight:    Fair     Diagnoses:  1. Grief        Collateral Reports Completed:  Not Applicable    Plan: (Homework, other):  Patient was given information about behavioral services and encouraged to schedule a follow up appointment with the clinic [facility] as needed.  She was also given information about mental health symptoms and treatment options .  CD Recommendations: Maintain Sobriety.  LAMINE Ndiaye, [facility]      ______________________________________________________________________    Integrated Primary Care Behavioral Health Treatment Plan    Patient's Name: Kim Anne  YOB: 1945    Date: To be completed

## 2018-03-12 NOTE — PROGRESS NOTES
SUBJECTIVE:                                                    Kim Anne is a 72 year old  female who presents to clinic today for the following health issues:  [facility]: Aviva Lackey    Patient states that she went to her dental appointment and is scheduled for a follow-up dental appointment for cleaning.   Patient also has her paperwork for her upcoming colonoscopy tomorrow morning, reviewed the bowel prep regimen as documented. Patient states that her niece will be driving her to the procedure tomorrow.     Diabetes Follow-up  States that she has been checking her blood sugar daily and there range around 94.  States that she is taling Lantus 10 units daily at night.     Patient is checking blood sugars: rarely.  Results range around 94.     Diabetic concerns: None     Symptoms of hypoglycemia (low blood sugar): none     Paresthesias (numbness or burning in feet) or sores: No     Date of last diabetic eye exam: may 2017    BP Readings from Last 2 Encounters:   03/05/18 172/79   02/27/18 133/67     Hemoglobin A1C (%)   Date Value   01/16/2018 6.0   10/25/2017 6.6 (H)     LDL Cholesterol Calculated (mg/dL)   Date Value   10/25/2017 104 (H)   09/07/2017 81       Lump: Patient reports a painful lump to her left lateral gluteus. States that she does not remember any trauma to the area. Denies any fall either. Patient states that this area has been bothering her especially when she sleeps too long on the left side.     Hypertension Follow-up      Outpatient blood pressures are not being checked.    Low Salt Diet: not monitoring salt      Amount of exercise or physical activity: None    Problems taking medications regularly: No    Medication side effects: none    Diet: regular (no restrictions)      Mental Health Follow up  Patient states that her mood has been ok. Reports that she is still staying with her son and grandson. Denies any concern with increased worrying, depression or anxiety.      Status since last visit: stable.     See PHQ-9 for current symptoms.  Other associated symptoms: None    Complicating factors: none.   Significant life event:  No   Current substance abuse:  None  Anxiety or Panic symptoms:  No    Sleep - good.   Appetite - no concern.   Exercise - some walking, not regularly.     Smoking - 3 cigarettes per day.   Alcohol - no  Street drugs - no  Marijuana - no  Caffeine - coffee    PHQ-9  English PHQ-9   Any Language               Social History   Substance Use Topics     Smoking status: Current Every Day Smoker     Packs/day: 0.80     Years: 40.00     Types: Cigarettes     Last attempt to quit: 10/31/2016     Smokeless tobacco: Never Used     Alcohol use No        Problem list and histories reviewed & adjusted, as indicated.  Additional history: as documented    Patient Active Problem List   Diagnosis     Hyperlipidemia LDL goal <100     ARAUZ (dyspnea on exertion)     COPD (chronic obstructive pulmonary disease) (H)     Edema     Fatigue     History of MI (myocardial infarction)     Snores     Knee pain, left     Bunion of great toe of left foot     Dystrophic nail     Arthritis     Peripheral vascular disease (H)     Chronic low back pain     Health Care Home     CKD (chronic kidney disease) stage 4, GFR 15-29 ml/min (H)     Abnormal gait     Advance Care Planning     Vitamin D deficiency     Constipation     Hypertension goal BP (blood pressure) < 130/80     Bradycardia     Callus of foot     Other chronic pain     Cataracts, bilateral     Hyperopic astigmatism of both eyes     Presbyopia     Asymptomatic bunion, right     Acquired hypothyroidism     Type 2 diabetes mellitus with diabetic chronic kidney disease (H)     Hypertension associated with diabetes (H)     Hyperlipidemia associated with type 2 diabetes mellitus (H)     Obesity (BMI 30-39.9)     History of sick sinus syndrome     Nephrotic syndrome     Pneumonia     Grief     Cigarette nicotine dependence without  complication     HCOM with LVOT     Hyperkalemia     Type 2 diabetes, HbA1C goal < 8% (H)     Smoker     Single kidney     Hypothyroid     Hypertension goal BP (blood pressure) < 140/90     Hyperlipidemia     Diabetic nephropathy (H)     Hypoalbuminemia     Past Surgical History:   Procedure Laterality Date     APPENDECTOMY       BLEPHAROPLASTY BILATERAL  2013    Procedure: BLEPHAROPLASTY BILATERAL;  BILATERAL UPPER EYELID BLEPHAROPLASTY ;  Surgeon: Olayinka Lyon MD;  Location: [facility]     CATARACT IOL, RT/LT Bilateral      CHOLECYSTECTOMY       COLONOSCOPY  7/15/2011    polyps repeat in 5 years     elected term pregnancy       HYSTEROSCOPIC PLACEMENT CONTRACEPTIVE DEVICE       KIDNEY SURGERY      donated left kideny     OVARY SURGERY      left for cyst benign     subclavian stent  2010    [facility]       Social History   Substance Use Topics     Smoking status: Current Every Day Smoker     Packs/day: 0.80     Years: 40.00     Types: Cigarettes     Last attempt to quit: 10/31/2016     Smokeless tobacco: Never Used     Alcohol use No     Family History   Problem Relation Age of Onset     DIABETES Mother      brother, MGM, sister     KIDNEY DISEASE Brother      X2 DM two      Alcohol/Drug Child      daughter     Asthma No family hx of      C.A.D. No family hx of      Hypertension No family hx of      CEREBROVASCULAR DISEASE No family hx of      Breast Cancer No family hx of      Cancer - colorectal No family hx of      Prostate Cancer No family hx of      Allergies No family hx of      Alzheimer Disease No family hx of      Anesthesia Reaction No family hx of      Arthritis No family hx of      Blood Disease No family hx of      CANCER No family hx of      Cardiovascular No family hx of      Circulatory No family hx of      Congenital Anomalies No family hx of      Connective Tissue Disorder No family hx of      Depression No family hx of      Eye Disorder No family hx of      Genetic  Disorder No family hx of      GASTROINTESTINAL DISEASE No family hx of      Genitourinary Problems No family hx of      Gynecology No family hx of      HEART DISEASE No family hx of      Lipids No family hx of      Musculoskeletal Disorder No family hx of      Neurologic Disorder No family hx of      Obesity No family hx of      OSTEOPOROSIS No family hx of      Psychotic Disorder No family hx of      Respiratory No family hx of      Thyroid Disease No family hx of      Glaucoma No family hx of      Macular Degeneration No family hx of            Current Outpatient Prescriptions   Medication Sig Dispense Refill     oxyCODONE IR (ROXICODONE) 10 MG tablet Take 1 tablet (10 mg) by mouth every 8 hours as needed 90 tablet 0     mometasone-formoterol (DULERA) 100-5 MCG/ACT oral inhaler Inhale 2 puffs into the lungs 2 times daily 1 Inhaler 1     levothyroxine (SYNTHROID/LEVOTHROID) 200 MCG tablet TAKE 1 TABLET BY MOUTH EVERY DAY 30 tablet 1     ASPIR-LOW 81 MG EC tablet TAKE 1 TABLET BY MOUTH EVERY  tablet 2     azithromycin (ZITHROMAX) 250 MG tablet Two tablets first day, then one tablet daily for four days. 6 tablet 0     fluticasone (FLONASE) 50 MCG/ACT spray Spray 1-2 sprays into both nostrils daily 1 Bottle 1     nicotine polacrilex (COMMIT) 2 MG lozenge Dissolve 1 lozenge orally every 4 hours as directed. 100 lozenge 2     order for DME Equipment being ordered: Compression socks.  Strength:15-20 mmHg 2 each 0     tiotropium (SPIRIVA HANDIHALER) 18 MCG capsule Inhale contents of one capsule daily. 90 capsule 3     atorvastatin (LIPITOR) 40 MG tablet Take 1 tablet (40 mg) by mouth daily 90 tablet 1     hydrALAZINE (APRESOLINE) 25 MG tablet Take 1 tablet (25 mg) by mouth 2 times daily (Patient taking differently: Take 50 mg by mouth daily ) 60 tablet 1     furosemide (LASIX) 40 MG tablet Take 1 tablet (40 mg) by mouth 2 times daily (Patient taking differently: Take 80 mg by mouth daily ) 60 tablet 3     sodium  bicarbonate 325 MG tablet Take 2 tablets (650 mg) by mouth 3 times daily 180 tablet 3     nitroGLYcerin (NITROSTAT) 0.4 MG sublingual tablet Place 1 tablet (0.4 mg) under the tongue every 5 minutes as needed for chest pain 25 tablet 0     docusate sodium (COLACE) 100 MG capsule Take 1 capsule (100 mg) by mouth 2 times daily 60 capsule 4     amLODIPine (NORVASC) 5 MG tablet Take 2 tablets (10 mg) by mouth daily 60 tablet 3     ferrous sulfate (IRON) 325 (65 FE) MG tablet Take 1 tablet (325 mg) by mouth daily (with breakfast) 100 tablet 1     albuterol (PROAIR HFA/PROVENTIL HFA/VENTOLIN HFA) 108 (90 BASE) MCG/ACT Inhaler Inhale 2 puffs into the lungs every 6 hours as needed for shortness of breath / dyspnea or wheezing 18 g 3     insulin glargine (LANTUS) 100 UNIT/ML injection Inject 10 Units Subcutaneous every morning 15 mL 3     order for DME Equipment being ordered: two pairs moderate knee high support hose 2 Device 1     blood glucose monitoring (FREESTYLE) lancets Test BS four times daily as directed 1 Box 12     blood glucose monitoring (FREESTYLE LITE) test strip TEST BLOOD SUGAR TWO TIMES A DAY 50 strip 12     metoprolol (TOPROL-XL) 25 MG 24 hr tablet Take 1 tablet (25 mg) by mouth daily 90 tablet 1     nystatin (MYCOSTATIN) 853295 UNIT/GM POWD Apply 1 g topically 3 times daily as needed 30 g 1     polyethylene glycol (MIRALAX) powder Take 17 g (1 capful) by mouth daily 510 g 2     Alcohol Swabs (SM ALCOHOL PREP) 70 % PADS Externally apply 1 pad topically 4 times daily 100 each 11     order for DME One wheeled walker with seat and brakes and basket 1 Device 0     Cholecalciferol (VITAMIN D) 2000 UNITS tablet Take 2,000 Units by mouth daily 60 tablet 2     insulin pen needle 31G X 6 MM Use as directed 120 each 3     Emollient (EUCERIN CALMING DAILY MOIST) CREA Externally apply 1 dose * topically daily 1 Tube 12     Allergies   Allergen Reactions     Contrast Dye Hives and Itching     Clonidine      She had as  IP and thinks it made her itchy     Diatrizoate Other (See Comments)     Diltiazem      Severe bradycardia     Hydralazine      Noble tab patient thought made her itchy so stopped     Iodine-131      Recent Labs   Lab Test  02/16/18   0945  01/16/18   1055   10/25/17   0639  10/20/17   1626   09/07/17   1135  05/10/17   1025   11/02/16   0622   A1C   --   6.0   --   6.6*   --    --   6.4*  6.6*   < >   --    LDL   --    --    --   104*   --    --   81  96   --    --    HDL   --    --    --   67   --    --   75  66   --    --    TRIG   --    --    --   212*   --    --   283*  303*   --    --    ALT   --    --    --   16   --    --   18   --    --   21   CR  3.11*  3.34*   < >  2.77*   --    < >  2.70*  2.25*   < >  2.64*   GFRESTIMATED  15*  14*   < >  17*   --    < >  17*  21*   < >  18*   GFRESTBLACK  18*  16*   < >  20*   --    < >  21*  26*   < >  22*   POTASSIUM  4.5  4.8   < >  4.3   --    < >  6.2*  5.5*   < >  5.6*   TSH   --    --    --    --   1.04   --   71.77*   --    < >   --     < > = values in this interval not displayed.      BP Readings from Last 3 Encounters:   03/05/18 172/79   02/27/18 133/67   02/16/18 182/73    Wt Readings from Last 3 Encounters:   02/27/18 168 lb 14.4 oz (76.6 kg)   02/16/18 168 lb 9.6 oz (76.5 kg)   02/12/18 172 lb (78 kg)        Labs reviewed in EPIC  Problem list, Medication list, Allergies, and Medical/Social/Surgical histories reviewed in [facility] and updated as appropriate.     ROS: Constitutional, neuro, ENT, endocrine, pulmonary, cardiac, gastrointestinal, genitourinary, musculoskeletal, integument and psychiatric systems are negative, except as otherwise noted above in the HPI.   OBJECTIVE:                                                    /78 (BP Location: Right arm)  Pulse 69  Temp 97.8  F (36.6  C) (Oral)  Resp 12  Wt 168 lb (76.2 kg)  SpO2 98%  BMI 27.12 kg/m2  There is no height or weight on file to calculate BMI.  GENERAL: healthy, alert, well  nourished, well hydrated, no distress  EYES: Eyes grossly normal to inspection, extraocular movements - intact, and PERRL  RESP: lungs clear to auscultation - no rales, no rhonchi, no wheezes  CV: regular rates and rhythm, normal S1 S2, no S3 or S4 and no murmur, no click or rub   MS: extremities- no gross deformities noted, no edema  NEURO: strength and tone- normal, sensory exam- grossly normal, mentation- intact, speech- normal,  Non focal no aphasia. No facial asymmetry.  Skin: palpable lump to left lateral gluteus. Lump is firm and tender to palapation. No redness to skin.   Diabetic foot exam: normal DP and PT pulses, no trophic changes or ulcerative lesions and normal sensory exam    Mental Status Assessment:  Appearance:   Appropriate   Eye Contact:   Good   Psychomotor Behavior: Normal   Attitude:   Cooperative   Orientation:   All  Speech   Rate / Production: Normal    Volume:  Normal   Mood:    Normal  Affect:    Appropriate   Thought Content:  Clear   Thought Form:  Coherent  Logical   Insight:    Fair   Attention Span and Concentration: appropriate.   Recent and Remote Memory:  intact  Fund of Knowledge: appropriate  Muscle Strength and Tone: normal     See [facility] notes     Diagnostic Test Results:  none      ASSESSMENT/PLAN:                                                    (E11.22,  N18.4,  Z79.4) Type 2 diabetes mellitus with stage 4 chronic kidney disease, with long-term current use of insulin (H)  (primary encounter diagnosis)  Comment: Noted above.  Lab Results   Component Value Date    A1C 6.0 01/16/2018    A1C 6.6 10/25/2017    A1C 6.4 09/07/2017    A1C 6.6 05/10/2017    A1C 6.3 12/13/2016       Plan: continue with Lantus 10 units daily.     (Z13.89) Screening for diabetic peripheral neuropathy  Comment: Denies any foot changes; no numbness or tingling.   Plan: FOOT EXAM  NO CHARGE [96142.114]          (I10) Hypertension goal BP (blood pressure) < 130/80  Comment: Denies any headache, chest pain,  heart palpitations or syncopal episodes.   Plan: continue with antihypertensives or cholesterol medication.    (R22.9) Local superficial swelling, mass or lump  Comment: Noted above.   Plan: CT Pelvis w/o Contrast        Will call patient with results.       I spent 25 min spent in direct face to face time with Kim Anne, greater than 50% in counseling and coordination of care for:  Diabetes, hypertension and depression/ anxiety.       JUNIOR Lopez [facility] PRIMARY CARE

## 2018-03-12 NOTE — MR AVS SNAPSHOT
"              After Visit Summary   3/12/2018    Kim Anne    MRN: [id number]           Patient Information     Date Of Birth          1945        Visit Information        Provider Department      3/12/2018 10:00 AM Aviva Lackey LICSW [facility] [place]        Today's Diagnoses     Grief    -  1       Follow-ups after your visit        Your next 10 appointments already scheduled     Apr 02, 2018 11:00 AM CDT   (Arrive by 10:45 AM)   SHORT with [facility] Medication Therapy Management ([facility])    [address] [place] [id number] [phone]              Who to contact     If you have questions or need follow up information about today's clinic visit or your schedule please contact [facility] PRIMARY [facility] directly at [phone].  Normal or non-critical lab and imaging results will be communicated to you by MyChart, letter or phone within 4 business days after the clinic has received the results. If you do not hear from us within 7 days, please contact the clinic through MyChart or phone. If you have a critical or abnormal lab result, we will notify you by phone as soon as possible.  Submit refill requests through [employer] or call your pharmacy and they will forward the refill request to us. Please allow 3 business days for your refill to be completed.          Additional Information About Your Visit        MyChart Information     [employer] lets you send messages to your doctor, view your test results, renew your prescriptions, schedule appointments and more. To sign up, go to [email or web address] . Click on \"Log in\" on the left side of the screen, which will take you to the Welcome page. Then click on \"Sign up Now\" on the right side of the page.     You will be asked to enter the access code listed below, as well as some personal information. Please follow the directions " to create your username and password.     Your access code is: [email or web address]  Expires: 6/3/2018 12:06 PM     Your access code will  in 90 days. If you need help or a new code, please call your [facility] clinic or [phone].        Care EveryWhere ID     This is your Care EveryWhere ID. This could be used by other organizations to access your [facility] medical records  [id number]         Blood Pressure from Last 3 Encounters:   18 112/78   18 172/79   18 133/67    Weight from Last 3 Encounters:   18 168 lb (76.2 kg)   18 168 lb 14.4 oz (76.6 kg)   18 168 lb 9.6 oz (76.5 kg)              Today, you had the following     No orders found for display         Today's Medication Changes          These changes are accurate as of 3/12/18 10:34 AM.  If you have any questions, ask your nurse or doctor.               These medicines have changed or have updated prescriptions.        Dose/Directions    furosemide 40 MG tablet   Commonly known as:  LASIX   This may have changed:    - how much to take  - when to take this   Used for:  Hyperkalemia, Edema, unspecified type        Dose:  40 mg   Take 1 tablet (40 mg) by mouth 2 times daily   Quantity:  60 tablet   Refills:  3       hydrALAZINE 25 MG tablet   Commonly known as:  APRESOLINE   This may have changed:    - how much to take  - when to take this   Used for:  HTN (hypertension), Chronic kidney disease, stage 4 (severe) (H)        Dose:  25 mg   Take 1 tablet (25 mg) by mouth 2 times daily   Quantity:  60 tablet   Refills:  1                Primary Care Provider Office Phone # Fax #    JUNIOR Bishop [phone]       [address] [place] [address] [address]  [facility] [address]        Equal Access to Services     CECIL TOLLIVER AH: Stephany Olmstead, wasegun delgadillo, qaybta kaalmafahad brown, kassandra shoemaker. So [facility] [phone].    ATENCIÓN: Si sujatha cochran a bailey disposición  servicios gratuitos de asistencia lingüística. Khadijah duran [phone].    We comply with applicable federal civil rights laws and [place] laws. We do not discriminate on the basis of race, color, national origin, age, disability, sex, sexual orientation, or gender identity.            Thank you!     Thank you for choosing [facility] PRIMARY CARE  for your care. Our goal is always to provide you with excellent care. Hearing back from our patients is one way we can continue to improve our services. Please take a few minutes to complete the written survey that you may receive in the mail after your visit with us. Thank you!             Your Updated Medication List - Protect others around you: Learn how to safely use, store and throw away your medicines at www.disposemymeds.org.          This list is accurate as of 3/12/18 10:34 AM.  Always use your most recent med list.                   Brand Name Dispense Instructions for use Diagnosis    albuterol 108 (90 BASE) MCG/ACT Inhaler    PROAIR HFA/PROVENTIL HFA/VENTOLIN HFA    18 g    Inhale 2 puffs into the lungs every 6 hours as needed for shortness of breath / dyspnea or wheezing    Chronic obstructive pulmonary disease, unspecified COPD type (H)       amLODIPine 5 MG tablet    NORVASC    60 tablet    Take 2 tablets (10 mg) by mouth daily    Renovascular hypertension       ASPIR-LOW 81 MG EC tablet   Generic drug:  aspirin     120 tablet    TAKE 1 TABLET BY MOUTH EVERY DAY    History of MI (myocardial infarction), Essential hypertension, benign       atorvastatin 40 MG tablet    LIPITOR    90 tablet    Take 1 tablet (40 mg) by mouth daily    Hyperlipidemia LDL goal <100       blood glucose monitoring lancets     1 Box    Test BS four times daily as directed    Type 2 diabetes mellitus without complication, with long-term current use of insulin (H)       blood glucose monitoring test strip    FREESTYLE LITE    50 strip    TEST BLOOD SUGAR TWO TIMES A  DAY    Type 2 diabetes mellitus without complication, with long-term current use of insulin (H)       docusate sodium 100 MG capsule    COLACE    60 capsule    Take 1 capsule (100 mg) by mouth 2 times daily    Constipation, unspecified constipation type       EUCERIN CALMING DAILY MOIST Crea     1 Tube    Externally apply 1 dose * topically daily    Type II or unspecified type diabetes mellitus with neurological manifestations, not stated as uncontrolled(250.60) (H)       ferrous sulfate 325 (65 FE) MG tablet    IRON    100 tablet    Take 1 tablet (325 mg) by mouth daily (with breakfast)    Anemia, iron deficiency       fluticasone 50 MCG/ACT spray    FLONASE    1 Bottle    Spray 1-2 sprays into both nostrils daily    Acute nasopharyngitis       furosemide 40 MG tablet    LASIX    60 tablet    Take 1 tablet (40 mg) by mouth 2 times daily    Hyperkalemia, Edema, unspecified type       hydrALAZINE 25 MG tablet    APRESOLINE    60 tablet    Take 1 tablet (25 mg) by mouth 2 times daily    HTN (hypertension), Chronic kidney disease, stage 4 (severe) (H)       insulin glargine 100 UNIT/ML injection    LANTUS    15 mL    Inject 10 Units Subcutaneous every morning    Controlled type 2 diabetes mellitus with stage 4 chronic kidney disease, with long-term current use of insulin (H)       insulin pen needle 31G X 6 MM     120 each    Use as directed    Type 2 diabetes mellitus without complication (H)       levothyroxine 200 MCG tablet    SYNTHROID/LEVOTHROID    30 tablet    TAKE 1 TABLET BY MOUTH EVERY DAY    Other specified hypothyroidism       metoprolol succinate 25 MG 24 hr tablet    TOPROL-XL    90 tablet    Take 1 tablet (25 mg) by mouth daily    Hypertension associated with diabetes (H)       mometasone-formoterol 100-5 MCG/ACT oral inhaler    DULERA    1 Inhaler    Inhale 2 puffs into the lungs 2 times daily    COPD (chronic obstructive pulmonary disease) (H)       nicotine polacrilex 2 MG lozenge    COMMIT    100  lozenge    Dissolve 1 lozenge orally every 4 hours as directed.    Chronic obstructive pulmonary disease, unspecified COPD type (H)       nitroGLYcerin 0.4 MG sublingual tablet    NITROSTAT    25 tablet    Place 1 tablet (0.4 mg) under the tongue every 5 minutes as needed for chest pain    History of MI (myocardial infarction)       nystatin 783735 UNIT/GM Powd    MYCOSTATIN    30 g    Apply 1 g topically 3 times daily as needed    Groin rash       * order for DME     1 Device    One wheeled walker with seat and brakes and basket    Risk for falls       * order for DME     2 Device    Equipment being ordered: two pairs moderate knee high support hose    Edema, unspecified type       * order for DME     2 each    Equipment being ordered: Compression socks. Strength:15-20 mmHg    Localized edema       oxyCODONE IR 10 MG tablet    ROXICODONE    90 tablet    Take 1 tablet (10 mg) by mouth every 8 hours as needed    Chronic low back pain without sciatica, unspecified back pain laterality       polyethylene glycol powder    MIRALAX    510 g    Take 17 g (1 capful) by mouth daily    Slow transit constipation       SM ALCOHOL PREP 70 % Pads     100 each    Externally apply 1 pad topically 4 times daily    Type 2 diabetes mellitus without complication, with long-term current use of insulin (H)       sodium bicarbonate 325 MG tablet     180 tablet    Take 2 tablets (650 mg) by mouth 3 times daily    Acidosis, CKD (chronic kidney disease) stage 4, GFR 15-29 ml/min (H)       tiotropium 18 MCG capsule    SPIRIVA HANDIHALER    90 capsule    Inhale contents of one capsule daily.    Pulmonary emphysema, unspecified emphysema type (H)       vitamin D 2000 UNITS tablet     60 tablet    Take 2,000 Units by mouth daily    Vitamin D deficiency disease       * Notice:  This list has 3 medication(s) that are the same as other medications prescribed for you. Read the directions carefully, and ask your doctor or other care provider to  review them with you.

## 2018-03-13 ENCOUNTER — CARE COORDINATION (OUTPATIENT)
Dept: GERIATRIC MEDICINE | Facility: CLINIC | Age: 73
End: 2018-03-13

## 2018-03-13 ENCOUNTER — TRANSFERRED RECORDS (OUTPATIENT)
Dept: HEALTH INFORMATION MANAGEMENT | Facility: CLINIC | Age: 73
End: 2018-03-13

## 2018-03-13 ASSESSMENT — PATIENT HEALTH QUESTIONNAIRE - PHQ9: SUM OF ALL RESPONSES TO PHQ QUESTIONS 1-9: 0

## 2018-03-13 ASSESSMENT — ANXIETY QUESTIONNAIRES: GAD7 TOTAL SCORE: 0

## 2018-03-13 NOTE — PROGRESS NOTES
Arranged transportation thru [facility] for the below appt:  Appt Date & Time: 3/15 @ 9:00 am  Clinic Name & Address:  [facility] [address] [address]  Transportation Provider: [employer]   time:  8:00 am    Notified Kim of  time.    Brianna Barrios  Case Management Specialist  [facility]   [phone]

## 2018-03-20 ENCOUNTER — CARE COORDINATION (OUTPATIENT)
Dept: GERIATRIC MEDICINE | Facility: CLINIC | Age: 73
End: 2018-03-20

## 2018-03-20 NOTE — PROGRESS NOTES
"[facility] Six-Month Telephone Assessment    6 month telephone assessment completed on 3/20/18.      ER visits: No  Hospitalizations: No  TCU stays: No  Significant health status changes: denies  Falls/Injuries: No  ADL/IADL changes: No  Changes in services: No    Member continues to decline annual home visit. States she is too busy and is getting tired of appointments.   Reviewed smoking cessation.  States she has not gotten the lozenges yet but has reduced her smoking to 4 per/day.   Reviewed her BP.  States is monitoring every other day.  When asked what the last reading was she states it was \"160 something\".  States she logs her BP reading and brings to appointments.   Reviewed her home situation.  Confirms son and grandchildren are still staying there.   Member does not offer much information voluntarily.  Mostly responds with \"yes\" or \"no\" when questions are asked.       Caregiver Assessment follow up:  n/a    Goals: See POC in chart for goal progress documentation.      Will see member in 6 months for an annual health risk assessment.   Encouraged member to call CC with any questions or concerns in the meantime.     Batool Mia RN, BSN, PHN  [facility]  [phone]  Fax: [phone]            "

## 2018-03-30 DIAGNOSIS — G89.29 CHRONIC LOW BACK PAIN WITHOUT SCIATICA, UNSPECIFIED BACK PAIN LATERALITY: ICD-10-CM

## 2018-03-30 DIAGNOSIS — M54.50 CHRONIC LOW BACK PAIN WITHOUT SCIATICA, UNSPECIFIED BACK PAIN LATERALITY: ICD-10-CM

## 2018-03-30 RX ORDER — OXYCODONE HYDROCHLORIDE 10 MG/1
10 TABLET ORAL EVERY 8 HOURS PRN
Qty: 90 TABLET | Refills: 0 | Status: SHIPPED | OUTPATIENT
Start: 2018-03-30 | End: 2018-04-26

## 2018-03-30 NOTE — TELEPHONE ENCOUNTER
Pt called she would like to pick pick Rx today in clinic. Pt stated that she made the refill request on 3/27, but writer can't find any message in pt's chart.   Pt contact info: [phone]      Bucky Weller

## 2018-03-30 NOTE — TELEPHONE ENCOUNTER
oxyCODONE IR (ROXICODONE) 10 MG tablet  Controlled Substance Refill Request    Last refill: 3/2/18, , 90 for 30 days     Last clinic visit: 3/12/18    Next appt: 4/10/18    Controlled substance agreement on file: Yes:  Date 2/16/17.  With previous provider    Documentation in problem list reviewed:  Yes    Processing:  Patient will  in clinic    [place] reviewed:  reviewed- no concerns  [place]  [email or web address]      Juan Miles RN

## 2018-04-03 ENCOUNTER — CARE COORDINATION (OUTPATIENT)
Dept: NEPHROLOGY | Facility: CLINIC | Age: 73
End: 2018-04-03

## 2018-04-03 NOTE — PROGRESS NOTES
Reason for Call    Called patient for CKD 4 follow up. She said she's doing well with no new symptoms or concerns. Denies uremic symptoms. Says her BP continues to be 160s-170s/80s at home.     She cancelled an appointment last month, so we will get her in to see MATT Montiel next week. Asked patient to bring her medications to the appointment.    Ayush Oneal, RN, BAN  Nephrology RN Care Coordinator

## 2018-04-09 ENCOUNTER — PATIENT OUTREACH (OUTPATIENT)
Dept: GERIATRIC MEDICINE | Facility: CLINIC | Age: 73
End: 2018-04-09

## 2018-04-09 NOTE — PROGRESS NOTES
[facility] Care Coordination Contact  Arranged transportation thru [facility] for the below appts:  Appt Date & Time: 04/10 at 9am  Clinic Name & Address:  [facility] ([address], [facility])  Transportation Provider: [employer]   time:  8-8:30am    Appt Date & Time: 04/11 at 10am  Clinic Name & Address:  [facility] ([address], [facility])  Transportation Provider: [employer]   time:  9-9:30am    Notified member of  time.    Luci Godinez  CMS Supervisor  [facility]  [phone]

## 2018-04-10 ENCOUNTER — OFFICE VISIT (OUTPATIENT)
Dept: PHARMACY | Facility: CLINIC | Age: 73
End: 2018-04-10
Payer: COMMERCIAL

## 2018-04-10 ENCOUNTER — OFFICE VISIT (OUTPATIENT)
Dept: BEHAVIORAL HEALTH | Facility: CLINIC | Age: 73
End: 2018-04-10
Payer: COMMERCIAL

## 2018-04-10 ENCOUNTER — OFFICE VISIT (OUTPATIENT)
Dept: FAMILY MEDICINE | Facility: CLINIC | Age: 73
End: 2018-04-10
Payer: COMMERCIAL

## 2018-04-10 VITALS
SYSTOLIC BLOOD PRESSURE: 142 MMHG | BODY MASS INDEX: 26.47 KG/M2 | RESPIRATION RATE: 16 BRPM | DIASTOLIC BLOOD PRESSURE: 68 MMHG | HEART RATE: 85 BPM | OXYGEN SATURATION: 97 % | WEIGHT: 164 LBS | TEMPERATURE: 98.4 F

## 2018-04-10 DIAGNOSIS — D50.9 IRON DEFICIENCY ANEMIA, UNSPECIFIED IRON DEFICIENCY ANEMIA TYPE: ICD-10-CM

## 2018-04-10 DIAGNOSIS — Z71.6 ENCOUNTER FOR SMOKING CESSATION COUNSELING: ICD-10-CM

## 2018-04-10 DIAGNOSIS — I10 HYPERTENSION GOAL BP (BLOOD PRESSURE) < 140/90: ICD-10-CM

## 2018-04-10 DIAGNOSIS — Z79.4 TYPE 2 DIABETES MELLITUS WITHOUT COMPLICATION, WITH LONG-TERM CURRENT USE OF INSULIN (H): ICD-10-CM

## 2018-04-10 DIAGNOSIS — E87.20 ACIDOSIS: ICD-10-CM

## 2018-04-10 DIAGNOSIS — N18.4 TYPE 2 DIABETES MELLITUS WITH STAGE 4 CHRONIC KIDNEY DISEASE, WITH LONG-TERM CURRENT USE OF INSULIN (H): ICD-10-CM

## 2018-04-10 DIAGNOSIS — N18.4 CHRONIC KIDNEY DISEASE, STAGE 4 (SEVERE) (H): ICD-10-CM

## 2018-04-10 DIAGNOSIS — E11.9 TYPE 2 DIABETES MELLITUS WITHOUT COMPLICATION, WITH LONG-TERM CURRENT USE OF INSULIN (H): ICD-10-CM

## 2018-04-10 DIAGNOSIS — K59.01 SLOW TRANSIT CONSTIPATION: ICD-10-CM

## 2018-04-10 DIAGNOSIS — E87.5 HYPERKALEMIA: ICD-10-CM

## 2018-04-10 DIAGNOSIS — G89.29 CHRONIC LOW BACK PAIN, UNSPECIFIED BACK PAIN LATERALITY, WITH SCIATICA PRESENCE UNSPECIFIED: Primary | ICD-10-CM

## 2018-04-10 DIAGNOSIS — J43.9 PULMONARY EMPHYSEMA, UNSPECIFIED EMPHYSEMA TYPE (H): ICD-10-CM

## 2018-04-10 DIAGNOSIS — R60.9 EDEMA, UNSPECIFIED TYPE: ICD-10-CM

## 2018-04-10 DIAGNOSIS — M54.5 CHRONIC LOW BACK PAIN, UNSPECIFIED BACK PAIN LATERALITY, WITH SCIATICA PRESENCE UNSPECIFIED: Primary | ICD-10-CM

## 2018-04-10 DIAGNOSIS — Z79.4 TYPE 2 DIABETES MELLITUS WITH STAGE 4 CHRONIC KIDNEY DISEASE, WITH LONG-TERM CURRENT USE OF INSULIN (H): ICD-10-CM

## 2018-04-10 DIAGNOSIS — E11.22 TYPE 2 DIABETES MELLITUS WITH STAGE 4 CHRONIC KIDNEY DISEASE, WITH LONG-TERM CURRENT USE OF INSULIN (H): ICD-10-CM

## 2018-04-10 DIAGNOSIS — I10 ESSENTIAL HYPERTENSION: Primary | ICD-10-CM

## 2018-04-10 DIAGNOSIS — F43.21 GRIEF: Primary | ICD-10-CM

## 2018-04-10 DIAGNOSIS — N18.4 CKD (CHRONIC KIDNEY DISEASE) STAGE 4, GFR 15-29 ML/MIN (H): ICD-10-CM

## 2018-04-10 PROCEDURE — 99607 MTMS BY PHARM ADDL 15 MIN: CPT | Performed by: PHARMACIST

## 2018-04-10 PROCEDURE — 99214 OFFICE O/P EST MOD 30 MIN: CPT | Performed by: NURSE PRACTITIONER

## 2018-04-10 PROCEDURE — 99606 MTMS BY PHARM EST 15 MIN: CPT | Performed by: PHARMACIST

## 2018-04-10 PROCEDURE — 90832 PSYTX W PT 30 MINUTES: CPT | Performed by: SOCIAL WORKER

## 2018-04-10 RX ORDER — FERROUS SULFATE 325(65) MG
1 TABLET ORAL
Qty: 100 TABLET | Refills: 3 | Status: SHIPPED | OUTPATIENT
Start: 2018-04-10 | End: 2018-08-16

## 2018-04-10 RX ORDER — FUROSEMIDE 40 MG
40 TABLET ORAL DAILY
Qty: 60 TABLET | Refills: 3 | COMMUNITY
Start: 2018-04-10 | End: 2018-05-16

## 2018-04-10 RX ORDER — HYDRALAZINE HYDROCHLORIDE 25 MG/1
25 TABLET, FILM COATED ORAL 2 TIMES DAILY
Qty: 60 TABLET | Refills: 1 | COMMUNITY
Start: 2018-04-10 | End: 2018-07-06

## 2018-04-10 RX ORDER — TIOTROPIUM BROMIDE 18 UG/1
CAPSULE ORAL; RESPIRATORY (INHALATION)
Qty: 90 CAPSULE | Refills: 3 | Status: SHIPPED | OUTPATIENT
Start: 2018-04-10 | End: 2018-11-23

## 2018-04-10 RX ORDER — SODIUM BICARBONATE 325 MG/1
325 TABLET ORAL 2 TIMES DAILY
Qty: 180 TABLET | Refills: 3 | COMMUNITY
Start: 2018-04-10 | End: 2018-04-11

## 2018-04-10 NOTE — NURSING NOTE
"Chief Complaint   Patient presents with     Hypertension     Diabetes     Anxiety     Depression       Initial /68 (BP Location: Right arm)  Pulse 85  Temp 98.4  F (36.9  C) (Oral)  Resp 16  Wt 164 lb (74.4 kg)  SpO2 97%  BMI 26.47 kg/m2 Estimated body mass index is 26.47 kg/(m^2) as calculated from the following:    Height as of 2/16/18: 5' 6\" (1.676 m).    Weight as of this encounter: 164 lb (74.4 kg).  Medication Reconciliation: complete   Carola Salamanca CMA      "

## 2018-04-10 NOTE — PROGRESS NOTES
[facility]   April 10, 2018      Behavioral Health Clinician Progress Note    Patient Name: Kim Anne           Service Type: Individual      Service Location:   Face to Face in Clinic     Session Start Time: 8:25am  Session End Time: 8:52am      Session Length: 16 - 37      Attendees: Patient, PCP and MTM    Visit Activities (Refresh list every visit): [facility] [employer]    Diagnostic Assessment Date: TBD  Treatment Plan Review Date: TBD  See Flowsheets for today's PHQ-9 and JUSTINE-7 results  Previous PHQ-9:   PHQ-9 SCORE 2/14/2017 2/12/2018 3/12/2018   Total Score - - -   Total Score - - -   Total Score 1 0 0     Previous JUSTINE-7:   JUSTINE-7 SCORE 12/2/2016 12/12/2016 3/12/2018   Total Score - - -   Total Score 0 0 0   Total Score - - -       NAE LEVEL:  NAE Score (Last Two) 2/18/2013 5/23/2014   NAE Raw Score 48 45   Activation Score 80 73.1   NAE Level 4 4       DATA  Extended Session (60+ minutes): No  Interactive Complexity: No  Crisis: No    Treatment Objective(s) Addressed in This Session:  Target Behavior(s): disease management/lifestyle changes manage diabetes better    Grief / Loss: will process grief/loss issues in an adaptive manner  Psychological distress related to Diabetes    Current Stressors / Issues:  [facility] met with patient at PCP request to assess current behavioral health needs and provide information and support as necessary.  Pt reported that she has been doing well. Pt stated that she has been struggling to stop smoking cigarettes. Pt stated that she smoked marijuana and stopped that much more easily than stopping smoking has been. Pt stated that she went down to two cigarettes a day and is now back to four cigarettes a day. Pt denied any current depression or anxiety. Pt stated that she thinks her mood has been stable and she feels like her needs are being met in terms of her mental health. Pt stated that her sleep has been good however she does wake up from pain in  "her hip. Pt stated that she still has her son living with her and his son as well as \"the toddlers\". Pt is guarded about who is living with her as CPS has been called in the past. Pt reported that she got a stationary bike that she has been using. Pt was resistant to setting a plan in terms of her exercise and instead just wanted to \"do it whenever I want to\". Pt was confused about some of her medications so her MTM and PCP helped pt go through her medications. Pt brought them into the appointment.    Reviewed new and ongoing stressors  Reviewed mental health symptoms and appropriate coping mechanisms    I affirmed the steps this patient has taken to address physical and behavioral health issues, and offered continued behavioral health services or referral, now or in the future, as needed by the patient.    Progress on Treatment Objective(s) / Homework:  Satisfactory progress - CONTEMPLATION (Considering change and yet undecided); Intervened by assessing the negative and positive thinking (ambivalence) about behavior change    Motivational Interviewing    MI Intervention: Expressed Empathy/Understanding, Supported Autonomy, Collaboration, Evocation, Open-ended questions and Reflections: simple and complex     Change Talk Expressed by the Patient: Committment to change Activation Taking steps    Provider Response to Change Talk: E - Evoked more info from patient about behavior change, A - Affirmed patient's thoughts, decisions, or attempts at behavior change, R - Reflected patient's change talk and S - Summarized patient's change talk statements      Care Plan review completed: No    Medication Review:  No changes to current psychiatric medication(s)    Medication Compliance:  Yes    Changes in Health Issues:   None reported    Chemical Use Review:   Substance Use: Chemical use reviewed, no active concerns identified      Tobacco Use: Yes, increase.  Client reports frequency of use 6 cigarettes daily. Reviewed " information and resources for quitting  Reviewed options for assistance and intervention  Provided encouragement to quit     Assessment: Current Emotional / Mental Status (status of significant symptoms):  Risk status (Self / Other harm or suicidal ideation)  Patient denies a history of suicidal ideation, suicide attempts, self-injurious behavior, homicidal ideation, homicidal behavior and and other safety concerns  Patient denies current fears or concerns for personal safety.  Patient denies current or recent suicidal ideation or behaviors.  Patient denies current or recent homicidal ideation or behaviors.  Patient denies current or recent self injurious behavior or ideation.  Patient denies other safety concerns.  A safety and risk management plan has not been developed at this time, however patient was encouraged to call [place] [phone] should there be a change in any of these risk factors.    Appearance:   Appropriate   Eye Contact:   Fair   Psychomotor Behavior: Normal   Attitude:   Guarded   Orientation:   All  Speech   Rate / Production: Impoverished  Monotone    Volume:  Normal   Mood:    Normal  Affect:    Blunted  Flat   Thought Content:  Clear   Thought Form:  Logical   Insight:    Fair     Diagnoses:  1. Grief        Collateral Reports Completed:  Not Applicable    Plan: (Homework, other):  Patient was given information about behavioral services and encouraged to schedule a follow up appointment with the clinic [facility] as needed.  She was also given information about mental health symptoms and treatment options .  CD Recommendations: Maintain Sobriety.  LAMINE Ndiaye, [facility]      ______________________________________________________________________    Integrated Primary Care Behavioral Health Treatment Plan    Patient's Name: Kim Anne  YOB: 1945    Date: To be completed

## 2018-04-10 NOTE — LETTER
[facility] PRIMARY CARE    04/10/18    Patient: Kim Anne  YOB: 1945  Medical Record Number: [id number]                                                                  Controlled Substance Agreement  I understand that my care provider has prescribed controlled substances (narcotics, tranquilizers, and/or stimulants) to help manage my condition(s).  I am taking this medicine to help me function or work.  I know that this is strong medicine.  It could have serious side effects and even cause a dependency on the drug.  If I stop these medicines suddenly, I could have severe withdrawal symptoms.    The risks, benefits, and side effects of these medication(s) were explained to me.  I agree that:  1. I will take part in other treatments as advised by my provider.  This may be psychiatry or counseling, physical therapy, behavioral therapy, group treatment, or a referral to a pain clinic.  I will reduce or stop my medicine when my provider tells me to do so.   2. I will take my medicines as prescribed.  I will not change the dose or schedule unless my provider tells me to.  There will be no refills if I  run out early.   I may be contacted at any time without warning and asked to complete a drug test or pill count.   3. I will keep all my appointments at the clinic.  If I miss appointments or fail to follow instructions, my provider may stop my medicine.  4. I will not ask other providers to prescribe controlled substances. And I will not accept controlled substances from other people. If I need another prescribed controlled substance for a new reason, I will notify my provider within one business day.  5. If I enroll in the [place] Medical Marijuana program, I will tell my provider.  I will also sign an agreement to share my medical records with my provider.  6. I will use one pharmacy to fill all of my controlled substance prescriptions.  If my prescription is mailed to my  pharmacy, it may take 5 to 7 days for my medicine to be ready.  7. I understand that my provider, clinic care team, and pharmacy can track controlled substance prescriptions from other providers through a central database (prescription monitoring program).  8. I will bring in my list of medications (or my medicine bottles) each time I come to the clinic.  REV-  04/2016                                                                                                                                            Page 1 of 2      [facility]TEGRATED PRIMARY CARE    04/10/18    Patient: Kim Anne  YOB: 1945  Medical Record Number: [id number]    9. Refills of controlled substances will be made only during office hours.  It is up to me to make sure that I do not run out of my medicines on weekends or holidays.    10. I am responsible for my prescriptions.  If the medicine is lost or stolen, it will not be replaced.   I also agree not to share these medicines with anyone.  11. I agree to not use ANY illegal or recreational drugs.  This includes marijuana, cocaine, bath salts or other drugs.  I agree not to use alcohol unless my provider says I may.  I agree to give urine samples whenever asked.  If I fail to give a urine sample, the provider may stop my medicine.     12. I will tell my nurse or provider right away if I become pregnant or have a new medical problem treated outside of [facility].  13. I understand that this medicine can affect my thinking and judgment.  It may be unsafe for me to drive, use machinery and do dangerous tasks.  I will not do any of these things until I know how the medicine affects me.  If my dose changes, I will wait to see how it affects me.  I will contact my provider if I have concerns about medicine side effects.  I understand that if I do not follow any of the conditions above, my prescriptions or treatment may be stopped.    I agree that my provider,  clinic care team, and pharmacy may work with any city, state or federal law enforcement agency that investigates the misuse, sale, or other diversion of my controlled medicine. I will allow my provider to discuss my care with or share a copy of this agreement with any other treating provider, pharmacy or emergency room where I receive care.  I agree to give up (waive) any right of privacy or confidentiality with respect to these authorizations.   I have read this agreement and have asked questions about anything I did not understand.   ___________________________________    ___________________________  Patient Signature                                                           Date and Time  ___________________________________     ____________________________  Witness                                                                            Date and Time  ___________________________________  JUNIOR Lopez CNP  REV-  04/2016                                                                                                                                                                 Page 2 of 2

## 2018-04-10 NOTE — MR AVS SNAPSHOT
After Visit Summary   4/10/2018    Kim Anne    MRN: [id number]           Patient Information     Date Of Birth          1945        Visit Information        Provider Department      4/10/2018 11:00 AM Aviva Lackey LICSW [facility]        Today's Diagnoses     Grief    -  1       Follow-ups after your visit        Your next 10 appointments already scheduled     Apr 10, 2018 11:00 AM CDT   Return Visit with JUNIOR Bishop CNP   [facility] ([facility])    [address]  [address]  [facility] [address]   [phone]            Apr 10, 2018 11:00 AM CDT   Return Visit with LAMINE Chahal   [facility] ([facility]    [address]  [address]  [facility] [address] [phone]            Apr 11, 2018 10:00 AM CDT   Lab with  LAB   [facility] ([facility])    [address]  1st Floor  [facility] [address] [phone]            Apr 11, 2018 11:00 AM CDT   (Arrive by 10:30 AM)   Return Visit with Wendy Espinal PA-C   [facility] Nephrology ([facility])    [address]  [address]  [facility] [address] [phone]            Apr 12, 2018 11:00 AM CDT   (Arrive by 10:45 AM)   CT PELVIS W/O CONTRAST with URCT1   [facility] Radiology ([facility])    [address] [place] [id number] [phone]           Please bring any scans or X-rays taken at other hospitals, if similar tests were done. Also bring a list of your medicines, including vitamins, minerals and over-the-counter drugs. It is safest to leave personal items at home.  Be sure to tell your doctor:   If you have any allergies.   If there s any chance you are pregnant.   If you are breastfeeding.  You may  have contrast for this exam. To prepare:   Do not eat or drink for 2 hours before your exam. If you need to take medicine, you may take it with small sips of water. (We may ask you to take liquid medicine as well.)   The day before your exam, drink extra fluids at least six 8-ounce glasses (unless your doctor tells you to restrict your fluids).   You will be given instructions on how to drink the contrast.  Patients over 70 or patients with diabetes or kidney problems:   If you haven t had a blood test (creatinine test) within the last 30 days, the Cardiologist/Radiologist may require you to get this test prior to your exam.  If you have diabetes:   Continue to take your metformin medication on the day of your exam  Please wear loose clothing, such as a sweat suit or jogging clothes. Avoid snaps, zippers and other metal. We may ask you to undress and put on a hospital gown.  If you have any questions, please call the Imaging Department where you will have your exam.              Who to contact     If you have questions or need follow up information about today's clinic visit or your schedule please contact [facility] directly at [phone].  Normal or non-critical lab and imaging results will be communicated to you by MySmart[employer], letter or phone within 4 business days after the clinic has received the results. If you do not hear from us within 7 days, please contact the clinic through My[employer] or phone. If you have a critical or abnormal lab result, we will notify you by phone as soon as possible.  Submit refill requests through [employer] or call your pharmacy and they will forward the refill request to us. Please allow 3 business days for your refill to be completed.          Additional Information About Your Visit        [employer] Information     [employer] lets you send messages to your doctor, view your test results, renew your prescriptions, schedule appointments and more. To sign up, go to  [email or web address] . Click on \"Log in\" on the left side of the screen, which will take you to the Welcome page. Then click on \"Sign up Now\" on the right side of the page.     You will be asked to enter the access code listed below, as well as some personal information. Please follow the directions to create your username and password.     Your access code is: [email or web address]  Expires: 6/3/2018 12:06 PM     Your access code will  in 90 days. If you need help or a new code, please call your [place] clinic or [phone].        Care EveryWhere ID     This is your Care EveryWhere ID. This could be used by other organizations to access your [place] medical records  [id number]         Blood Pressure from Last 3 Encounters:   04/10/18 142/68   18 112/78   18 172/79    Weight from Last 3 Encounters:   04/10/18 164 lb (74.4 kg)   18 168 lb (76.2 kg)   18 168 lb 14.4 oz (76.6 kg)              Today, you had the following     No orders found for display         Today's Medication Changes          These changes are accurate as of 4/10/18  8:56 AM.  If you have any questions, ask your nurse or doctor.               These medicines have changed or have updated prescriptions.        Dose/Directions    furosemide 40 MG tablet   Commonly known as:  LASIX   This may have changed:    - how much to take  - when to take this   Used for:  Hyperkalemia, Edema, unspecified type        Dose:  40 mg   Take 1 tablet (40 mg) by mouth 2 times daily   Quantity:  60 tablet   Refills:  3       hydrALAZINE 25 MG tablet   Commonly known as:  APRESOLINE   This may have changed:    - how much to take  - when to take this   Used for:  HTN (hypertension), Chronic kidney disease, stage 4 (severe) (H)        Dose:  25 mg   Take 1 tablet (25 mg) by mouth 2 times daily   Quantity:  60 tablet   Refills:  1                Primary Care Provider Office Phone # Fax #    JUNIOR Bishop [phone]    "    [address] [facility] [address]  [facility] [address]        Equal Access to Services     ARPITADIMPLE UNRULY : Hadii alysha redd changjanet Sofranciscaali, waaxda luqadaha, qaybta katannafahad minroyafahad, kassandra cary giselledeborah merazconchitaanna white . So [facility] [phone].    ATENCIÓN: Si habla español, tiene a bailey disposición servicios gratuitos de asistencia lingüística. Llame al [phone].    We comply with applicable federal civil rights laws and [place] laws. We do not discriminate on the basis of race, color, national origin, age, disability, sex, sexual orientation, or gender identity.            Thank you!     Thank you for choosing [facility]  for your care. Our goal is always to provide you with excellent care. Hearing back from our patients is one way we can continue to improve our services. Please take a few minutes to complete the written survey that you may receive in the mail after your visit with us. Thank you!             Your Updated Medication List - Protect others around you: Learn how to safely use, store and throw away your medicines at www.disposemymeds.org.          This list is accurate as of 4/10/18  8:56 AM.  Always use your most recent med list.                   Brand Name Dispense Instructions for use Diagnosis    albuterol 108 (90 BASE) MCG/ACT Inhaler    PROAIR HFA/PROVENTIL HFA/VENTOLIN HFA    18 g    Inhale 2 puffs into the lungs every 6 hours as needed for shortness of breath / dyspnea or wheezing    Chronic obstructive pulmonary disease, unspecified COPD type (H)       amLODIPine 5 MG tablet    NORVASC    60 tablet    Take 2 tablets (10 mg) by mouth daily    Renovascular hypertension       ASPIR-LOW 81 MG EC tablet   Generic drug:  aspirin     120 tablet    TAKE 1 TABLET BY MOUTH EVERY DAY    History of MI (myocardial infarction), Essential hypertension, benign       atorvastatin 40 MG tablet    LIPITOR    90 tablet    Take 1 tablet (40 mg) by mouth daily    Hyperlipidemia LDL goal  <100       blood glucose monitoring lancets     1 Box    Test BS four times daily as directed    Type 2 diabetes mellitus without complication, with long-term current use of insulin (H)       blood glucose monitoring test strip    FREESTYLE LITE    50 strip    TEST BLOOD SUGAR TWO TIMES A DAY    Type 2 diabetes mellitus without complication, with long-term current use of insulin (H)       docusate sodium 100 MG capsule    COLACE    60 capsule    Take 1 capsule (100 mg) by mouth 2 times daily    Constipation, unspecified constipation type       EUCERIN CALMING DAILY MOIST Crea     1 Tube    Externally apply 1 dose * topically daily    Type II or unspecified type diabetes mellitus with neurological manifestations, not stated as uncontrolled(250.60) (H)       ferrous sulfate 325 (65 FE) MG tablet    IRON    100 tablet    Take 1 tablet (325 mg) by mouth daily (with breakfast)    Anemia, iron deficiency       fluticasone 50 MCG/ACT spray    FLONASE    1 Bottle    Spray 1-2 sprays into both nostrils daily    Acute nasopharyngitis       furosemide 40 MG tablet    LASIX    60 tablet    Take 1 tablet (40 mg) by mouth 2 times daily    Hyperkalemia, Edema, unspecified type       hydrALAZINE 25 MG tablet    APRESOLINE    60 tablet    Take 1 tablet (25 mg) by mouth 2 times daily    HTN (hypertension), Chronic kidney disease, stage 4 (severe) (H)       insulin glargine 100 UNIT/ML injection    LANTUS    15 mL    Inject 10 Units Subcutaneous every morning    Controlled type 2 diabetes mellitus with stage 4 chronic kidney disease, with long-term current use of insulin (H)       insulin pen needle 31G X 6 MM     120 each    Use as directed    Type 2 diabetes mellitus without complication (H)       levothyroxine 200 MCG tablet    SYNTHROID/LEVOTHROID    30 tablet    TAKE 1 TABLET BY MOUTH EVERY DAY    Other specified hypothyroidism       metoprolol succinate 25 MG 24 hr tablet    TOPROL-XL    90 tablet    Take 1 tablet (25 mg) by  mouth daily    Hypertension associated with diabetes (H)       mometasone-formoterol 100-5 MCG/ACT oral inhaler    DULERA    1 Inhaler    Inhale 2 puffs into the lungs 2 times daily    COPD (chronic obstructive pulmonary disease) (H)       nicotine polacrilex 2 MG lozenge    COMMIT    100 lozenge    Dissolve 1 lozenge orally every 4 hours as directed.    Chronic obstructive pulmonary disease, unspecified COPD type (H)       nitroGLYcerin 0.4 MG sublingual tablet    NITROSTAT    25 tablet    Place 1 tablet (0.4 mg) under the tongue every 5 minutes as needed for chest pain    History of MI (myocardial infarction)       nystatin 229820 UNIT/GM Powd    MYCOSTATIN    30 g    Apply 1 g topically 3 times daily as needed    Groin rash       * order for DME     1 Device    One wheeled walker with seat and brakes and basket    Risk for falls       * order for DME     2 Device    Equipment being ordered: two pairs moderate knee high support hose    Edema, unspecified type       * order for DME     2 each    Equipment being ordered: Compression socks. Strength:15-20 mmHg    Localized edema       oxyCODONE IR 10 MG tablet    ROXICODONE    90 tablet    Take 1 tablet (10 mg) by mouth every 8 hours as needed    Chronic low back pain without sciatica, unspecified back pain laterality       polyethylene glycol powder    MIRALAX    510 g    Take 17 g (1 capful) by mouth daily    Slow transit constipation       SM ALCOHOL PREP 70 % Pads     100 each    Externally apply 1 pad topically 4 times daily    Type 2 diabetes mellitus without complication, with long-term current use of insulin (H)       sodium bicarbonate 325 MG tablet     180 tablet    Take 2 tablets (650 mg) by mouth 3 times daily    Acidosis, CKD (chronic kidney disease) stage 4, GFR 15-29 ml/min (H)       tiotropium 18 MCG capsule    SPIRIVA HANDIHALER    90 capsule    Inhale contents of one capsule daily.    Pulmonary emphysema, unspecified emphysema type (H)       vitamin  D 2000 UNITS tablet     60 tablet    Take 2,000 Units by mouth daily    Vitamin D deficiency disease       * Notice:  This list has 3 medication(s) that are the same as other medications prescribed for you. Read the directions carefully, and ask your doctor or other care provider to review them with you.

## 2018-04-10 NOTE — PROGRESS NOTES
SUBJECTIVE/OBJECTIVE:                Kim Anne is a 72 year old female coming in for a follow-up visit for Medication Therapy Management.  She was referred to me from Ailyn Nieves. Seen as a covisit with PCP and Aviva Lackey [facility].     Chief Complaint: Follow up from [facility] visit with Detnon Yuen PharmD on 3/5/18.      Tobacco: 0-1 pack per day - is interested in quittingTobacco Cessation Action Plan: NRT when able to obtain  Alcohol: not currently using    Medication Adherence/Access:  The patient misses their medication 0-2 times per week. Pt uses a pill box. Her grand daughter reminds her to take her meds. Takes medication 2 times a day. Brought in all her pills today, issues found and discussed below.   The patient fills general medications at [facility] pharmacy (on file).     Smoking Cessation:  How much does she smoke: increased from 2 to 4 cigarettes per day. She spoke with her family and they have changed to smoking on the porch. She has not been able to get NRT, feels too busy with appointments. Planning to work on quitting with her sister when she is back from out of town.   Why does she smoke: Stress reliever   Triggers for smoking include:  Stress, influenced by peers/family members  How long has she been smoking:  About 40 years  What is the most she ever smoked:  10-12 cigarettes daily  Tried quitting in the past: Yes.  How many times:  6-7.  For how long: 3 months.  What worked/didn t work in the past: Cold turkey worked in the past.  Tried Nicotine patches, but continued smoking with use.   Why does she want to quit (motivators for quitting): avoid the unwanted smell of smoke and bad breath, granddaughter has asthma, help lower blood pressure, increase quality of life    Hypertension/Edema: Current medications include furosemide 40mg daily (not BID as prescibed), Metoprolol ER 25mg daily, Amlodipine 10mg daily, Hydralazine 25mg BID.  Patient reports no side effects. She has been  checking BP infrequently, brings in cuff today. Recent home checks 148/112, 174/84    COPD: Current medications: Dulera 2 puffs BID, out of Spiriva.   She rinses out her mouth after using steroid inhaler. No issues or exacerbations.  Pt is not experiencing side effects.   Pt reports the following symptoms: none.  Pt does have an COPD Action Plan on file.     constipation: currently taking docusate 100mg BID and Miralax PRN. No side effects reported and controls constipation adequately.     Diabetes:  Pt currently taking Lantus 10u daily. Pt is not experiencing side effects.  SMBG: one time daily.   Ranges (patient reported): fasting 119 today 99 lowest, 166 highest  Patient is not experiencing hypoglycemia  Recent symptoms of high blood sugar? none  Eye exam: up to date  Foot exam: up to date  Microalbumin is not < 30 mg/g. Pt is not taking an ACEi/ARB due to hx hyperkalemia  Aspirin: Taking 81mg daily and denies side effects  Diet/Exercise: out of time to discuss today    CKD/anemia: currently taking sodium bicarb at 1 tablet BID (not 2 tablets TID as prescribed) and stopped taking ferrous sulfate 325mg daily, unclear how long ago. Follow-up scheduled with nephrology tomorrow.    Current labs include:    BP Readings from Last 3 Encounters:   03/12/18 112/78   03/05/18 172/79   02/27/18 133/67     Lab Results   Component Value Date    A1C 6.0 01/16/2018   .  Lab Results   Component Value Date    CHOL 214 10/25/2017     Lab Results   Component Value Date    TRIG 212 10/25/2017     Lab Results   Component Value Date    HDL 67 10/25/2017     Lab Results   Component Value Date     10/25/2017       Liver Function Studies -   Recent Labs   Lab Test  02/16/18   0945   10/25/17   0639   PROTTOTAL   --    --   5.8*   ALBUMIN  1.8*   < >  1.9*   BILITOTAL   --    --   0.2   ALKPHOS   --    --   164*   AST   --    --   13   ALT   --    --   16    < > = values in this interval not displayed.       Lab Results   Component  Value Date    UCRR 129 02/16/2018    MICROL 5320 07/07/2017    UMALCR 6325.80 (H) 07/07/2017       Last Basic Metabolic Panel:  Lab Results   Component Value Date     02/16/2018      Lab Results   Component Value Date    POTASSIUM 4.5 02/16/2018     Lab Results   Component Value Date    CHLORIDE 114 02/16/2018     Lab Results   Component Value Date    BUN 37 02/16/2018     Lab Results   Component Value Date    CR 3.11 02/16/2018     GFR Estimate   Date Value Ref Range Status   02/16/2018 15 (L) >60 mL/min/1.7m2 Final     Comment:     Non  GFR Calc   01/16/2018 14 (L) >60 mL/min/1.7m2 Final     Comment:     Non  GFR Calc   01/08/2018 15 (L) >60 mL/min/1.7m2 Final     Comment:     Non  GFR Calc     GFR Estimate If Black   Date Value Ref Range Status   02/16/2018 18 (L) >60 mL/min/1.7m2 Final     Comment:      GFR Calc   01/16/2018 16 (L) >60 mL/min/1.7m2 Final     Comment:      GFR Calc   01/08/2018 18 (L) >60 mL/min/1.7m2 Final     Comment:      GFR Calc     TSH   Date Value Ref Range Status   10/20/2017 1.04 0.40 - 4.00 mU/L Final   ]    Most Recent Immunizations   Administered Date(s) Administered     HepB 06/28/2012     Influenza (High Dose) 3 valent vaccine 09/28/2017     Influenza (IIV3) PF 11/04/2014     Pneumo Conj 13-V (2010&after) 01/05/2015     Pneumococcal 23 valent 04/01/2011     TD (ADULT, 7+) 07/12/2007     TDAP Vaccine (Adacel) 05/18/2011     Twinrix A/B 03/21/2012     Zoster vaccine, live 04/30/2012       ASSESSMENT:              Current medications were reviewed today as discussed above.      Medication Adherence: good, see below for resolution of issues found upon review     Smoking cessation: needs improvement. Provided encouragement for efforts to reduce smoking and  NRT when able.     Hypertension/Edema: improved. BP at goal <130/80 today. Will continue medications at the doses she is  currently taking.      COPD: stable. Encouraged restarting Spiriva, ordered.    constipation: stable    diabetes: stable. A1c at goal <8% without hypoglycemia.     CKD/anemia: GFR has continued to slowly decline. Pt will f/u with nephrology regarding dosing of sodium bicarbonate. Ordered refill for iron and agreeable to restart; last iron studies wnl with once daily supplementation.       PLAN:                  1.  and restart Spiriva and ferrous sulfate  2. F/u nephrology regarding sodium bicarb dosing    I spent 30 minutes with this patient today. All changes were made via collaborative practice agreement with Ailyn Nieves. A copy of the visit note was provided to the patient's primary care provider.     Will follow up in 1 month.    The patient was given a summary of these recommendations as an after visit summary.    Carmen Elder, PharmD, BCACP

## 2018-04-10 NOTE — PROGRESS NOTES
SUBJECTIVE:                                                    Kim Anne is a 72 year old  female who presents to clinic today for the following health issues:  [facility]: Aviva DOMINGUEZ    Diabetes Follow-up  Patient is checking blood sugars: 1 times daily.    Blood sugar testing frequency justification: Adjustment of medication(s)  Results are as follows:         am - 170    Diabetic concerns: None     Symptoms of hypoglycemia (low blood sugar): none     Paresthesias (numbness or burning in feet) or sores: Yes, occasionally     Date of last diabetic eye exam: 5/2017    BP Readings from Last 2 Encounters:   03/12/18 112/78   03/05/18 172/79     Hemoglobin A1C (%)   Date Value   01/16/2018 6.0   10/25/2017 6.6 (H)     LDL Cholesterol Calculated (mg/dL)   Date Value   10/25/2017 104 (H)   09/07/2017 81     Hypertension Follow-up  Patient denies any chest pain, heart palpitations, headaches or dizziness.     Outpatient blood pressures are occasionally checks at home.     Low Salt Diet: not monitoring salt      Mental Health Follow up: Anxiety/Depression  Patient states that she is getting ride set-up from [employer]. Patient denies any depression or anxiety. States that her mood has been ok.     Status since last visit: good, does not know she has depression    See PHQ-9 for current symptoms.  Other associated symptoms: None    Complicating factors: none  Significant life event:  No   Current substance abuse:  None  Anxiety or Panic symptoms:  No    Sleep - Good, nocturia 2-3 times per day.   Appetite - no issues.   Exercise - uses stationary bike and uses it 5 minutes 3-4 times per day    Smoking - about 2-4 cigarettes per day. States that she tried quitting with hard candy, and telling everyone to smoke outside.   Alcohol - no  Street drugs - no  Marijuana - no  Caffeine - coffee.     PHQ-9  English PHQ-9   Any Language               Problems taking medications regularly: No    Medication side  effects: none    Diet: regular (no restrictions)    Social History   Substance Use Topics     Smoking status: Current Every Day Smoker     Packs/day: 0.80     Years: 40.00     Types: Cigarettes     Last attempt to quit: 10/31/2016     Smokeless tobacco: Never Used     Alcohol use No        Problem list and histories reviewed & adjusted, as indicated.  Additional history: as documented    Patient Active Problem List   Diagnosis     Hyperlipidemia LDL goal <100     ARAUZ (dyspnea on exertion)     COPD (chronic obstructive pulmonary disease) (H)     Edema     Fatigue     History of MI (myocardial infarction)     Snores     Knee pain, left     Bunion of great toe of left foot     Dystrophic nail     Arthritis     Peripheral vascular disease (H)     Chronic low back pain     Health Care Home     CKD (chronic kidney disease) stage 4, GFR 15-29 ml/min (H)     Abnormal gait     Advance Care Planning     Vitamin D deficiency     Constipation     Hypertension goal BP (blood pressure) < 130/80     Bradycardia     Callus of foot     Other chronic pain     Cataracts, bilateral     Hyperopic astigmatism of both eyes     Presbyopia     Asymptomatic bunion, right     Acquired hypothyroidism     Type 2 diabetes mellitus with diabetic chronic kidney disease (H)     Hypertension associated with diabetes (H)     Hyperlipidemia associated with type 2 diabetes mellitus (H)     Obesity (BMI 30-39.9)     History of sick sinus syndrome     Nephrotic syndrome     Pneumonia     Grief     Cigarette nicotine dependence without complication     HCOM with LVOT     Hyperkalemia     Type 2 diabetes, HbA1C goal < 8% (H)     Smoker     Single kidney     Hypothyroid     Hypertension goal BP (blood pressure) < 140/90     Hyperlipidemia     Diabetic nephropathy (H)     Hypoalbuminemia     Past Surgical History:   Procedure Laterality Date     APPENDECTOMY       BLEPHAROPLASTY BILATERAL  9/18/2013    Procedure: BLEPHAROPLASTY BILATERAL;  BILATERAL UPPER  EYELID BLEPHAROPLASTY ;  Surgeon: Olayinka Lyon MD;  Location:  [facility]     CATARACT IOL, RT/LT Bilateral      CHOLECYSTECTOMY       COLONOSCOPY  7/15/2011    polyps repeat in 5 years     elected term pregnancy       HYSTEROSCOPIC PLACEMENT CONTRACEPTIVE DEVICE       KIDNEY SURGERY      donated left kideny     OVARY SURGERY      left for cyst benign     subclavian stent  2010    [facility]       Social History   Substance Use Topics     Smoking status: Current Every Day Smoker     Packs/day: 0.80     Years: 40.00     Types: Cigarettes     Last attempt to quit: 10/31/2016     Smokeless tobacco: Never Used     Alcohol use No     Family History   Problem Relation Age of Onset     DIABETES Mother      brother, MGM, sister     KIDNEY DISEASE Brother      X2 DM two      Alcohol/Drug Child      daughter     Asthma No family hx of      C.A.D. No family hx of      Hypertension No family hx of      CEREBROVASCULAR DISEASE No family hx of      Breast Cancer No family hx of      Cancer - colorectal No family hx of      Prostate Cancer No family hx of      Allergies No family hx of      Alzheimer Disease No family hx of      Anesthesia Reaction No family hx of      Arthritis No family hx of      Blood Disease No family hx of      CANCER No family hx of      Cardiovascular No family hx of      Circulatory No family hx of      Congenital Anomalies No family hx of      Connective Tissue Disorder No family hx of      Depression No family hx of      Eye Disorder No family hx of      Genetic Disorder No family hx of      GASTROINTESTINAL DISEASE No family hx of      Genitourinary Problems No family hx of      Gynecology No family hx of      HEART DISEASE No family hx of      Lipids No family hx of      Musculoskeletal Disorder No family hx of      Neurologic Disorder No family hx of      Obesity No family hx of      OSTEOPOROSIS No family hx of      Psychotic Disorder No family hx of      Respiratory No family hx  of      Thyroid Disease No family hx of      Glaucoma No family hx of      Macular Degeneration No family hx of            Current Outpatient Prescriptions   Medication Sig Dispense Refill     oxyCODONE IR (ROXICODONE) 10 MG tablet Take 1 tablet (10 mg) by mouth every 8 hours as needed 90 tablet 0     mometasone-formoterol (DULERA) 100-5 MCG/ACT oral inhaler Inhale 2 puffs into the lungs 2 times daily 1 Inhaler 1     levothyroxine (SYNTHROID/LEVOTHROID) 200 MCG tablet TAKE 1 TABLET BY MOUTH EVERY DAY 30 tablet 1     ASPIR-LOW 81 MG EC tablet TAKE 1 TABLET BY MOUTH EVERY  tablet 2     fluticasone (FLONASE) 50 MCG/ACT spray Spray 1-2 sprays into both nostrils daily 1 Bottle 1     nicotine polacrilex (COMMIT) 2 MG lozenge Dissolve 1 lozenge orally every 4 hours as directed. 100 lozenge 2     order for DME Equipment being ordered: Compression socks.  Strength:15-20 mmHg 2 each 0     tiotropium (SPIRIVA HANDIHALER) 18 MCG capsule Inhale contents of one capsule daily. 90 capsule 3     atorvastatin (LIPITOR) 40 MG tablet Take 1 tablet (40 mg) by mouth daily 90 tablet 1     hydrALAZINE (APRESOLINE) 25 MG tablet Take 1 tablet (25 mg) by mouth 2 times daily (Patient taking differently: Take 50 mg by mouth daily ) 60 tablet 1     furosemide (LASIX) 40 MG tablet Take 1 tablet (40 mg) by mouth 2 times daily (Patient taking differently: Take 80 mg by mouth daily ) 60 tablet 3     sodium bicarbonate 325 MG tablet Take 2 tablets (650 mg) by mouth 3 times daily 180 tablet 3     nitroGLYcerin (NITROSTAT) 0.4 MG sublingual tablet Place 1 tablet (0.4 mg) under the tongue every 5 minutes as needed for chest pain 25 tablet 0     docusate sodium (COLACE) 100 MG capsule Take 1 capsule (100 mg) by mouth 2 times daily 60 capsule 4     amLODIPine (NORVASC) 5 MG tablet Take 2 tablets (10 mg) by mouth daily 60 tablet 3     ferrous sulfate (IRON) 325 (65 FE) MG tablet Take 1 tablet (325 mg) by mouth daily (with breakfast) 100 tablet 1      albuterol (PROAIR HFA/PROVENTIL HFA/VENTOLIN HFA) 108 (90 BASE) MCG/ACT Inhaler Inhale 2 puffs into the lungs every 6 hours as needed for shortness of breath / dyspnea or wheezing 18 g 3     insulin glargine (LANTUS) 100 UNIT/ML injection Inject 10 Units Subcutaneous every morning 15 mL 3     order for DME Equipment being ordered: two pairs moderate knee high support hose 2 Device 1     blood glucose monitoring (FREESTYLE) lancets Test BS four times daily as directed 1 Box 12     blood glucose monitoring (FREESTYLE LITE) test strip TEST BLOOD SUGAR TWO TIMES A DAY 50 strip 12     metoprolol (TOPROL-XL) 25 MG 24 hr tablet Take 1 tablet (25 mg) by mouth daily 90 tablet 1     nystatin (MYCOSTATIN) 268316 UNIT/GM POWD Apply 1 g topically 3 times daily as needed 30 g 1     polyethylene glycol (MIRALAX) powder Take 17 g (1 capful) by mouth daily 510 g 2     Alcohol Swabs (SM ALCOHOL PREP) 70 % PADS Externally apply 1 pad topically 4 times daily 100 each 11     order for DME One wheeled walker with seat and brakes and basket 1 Device 0     Cholecalciferol (VITAMIN D) 2000 UNITS tablet Take 2,000 Units by mouth daily 60 tablet 2     insulin pen needle 31G X 6 MM Use as directed 120 each 3     Emollient (EUCERIN CALMING DAILY MOIST) CREA Externally apply 1 dose * topically daily 1 Tube 12     Allergies   Allergen Reactions     Contrast Dye Hives and Itching     Clonidine      She had as IP and thinks it made her itchy     Diatrizoate Other (See Comments)     Diltiazem      Severe bradycardia     Hydralazine      Lamb tab patient thought made her itchy so stopped     Iodine-131      Recent Labs   Lab Test  02/16/18   0945  01/16/18   1055   10/25/17   0639  10/20/17   1626   09/07/17   1135  05/10/17   1025   11/02/16   0622   A1C   --   6.0   --   6.6*   --    --   6.4*  6.6*   < >   --    LDL   --    --    --   104*   --    --   81  96   --    --    HDL   --    --    --   67   --    --   75  66   --    --    TRIG   --     --    --   212*   --    --   283*  303*   --    --    ALT   --    --    --   16   --    --   18   --    --   21   CR  3.11*  3.34*   < >  2.77*   --    < >  2.70*  2.25*   < >  2.64*   GFRESTIMATED  15*  14*   < >  17*   --    < >  17*  21*   < >  18*   GFRESTBLACK  18*  16*   < >  20*   --    < >  21*  26*   < >  22*   POTASSIUM  4.5  4.8   < >  4.3   --    < >  6.2*  5.5*   < >  5.6*   TSH   --    --    --    --   1.04   --   71.77*   --    < >   --     < > = values in this interval not displayed.      BP Readings from Last 3 Encounters:   03/12/18 112/78   03/05/18 172/79   02/27/18 133/67    Wt Readings from Last 3 Encounters:   03/12/18 168 lb (76.2 kg)   02/27/18 168 lb 14.4 oz (76.6 kg)   02/16/18 168 lb 9.6 oz (76.5 kg)        Labs reviewed in EPIC  Problem list, Medication list, Allergies, and Medical/Social/Surgical histories reviewed in [facility] and updated as appropriate.     ROS: Constitutional, neuro, ENT, endocrine, pulmonary, cardiac, gastrointestinal, genitourinary, musculoskeletal, integument and psychiatric systems are negative, except as otherwise noted above in the HPI.   OBJECTIVE:                                                    /68 (BP Location: Right arm)  Pulse 85  Temp 98.4  F (36.9  C) (Oral)  Resp 16  Wt 164 lb (74.4 kg)  SpO2 97%  BMI 26.47 kg/m2  Body mass index is 26.47 kg/(m^2).  GENERAL: healthy, alert, forgetful.  EYES: Eyes grossly normal to inspection, extraocular movements - intact, and PERRL  RESP: lungs with inspiratory wheezes  CV: regular rates and rhythm, normal S1 S2, no S3 or S4 and no murmur, no click or rub   MS: extremities- no gross deformities noted, mild bilateral lower leg edema  NEURO: strength and tone- normal, sensory exam- grossly normal, mentation- intact, speech- normal, reflexes- symmetric  Non focal no aphasia. No facial asymmetry.  LYMPHATICS: ant. cervical- normal, post. cervical- normal, supraclavicular- normal, inguinal- normal  Mental Status  Assessment:  Appearance:   Appropriate   Eye Contact:   Good   Psychomotor Behavior: Normal   Attitude:   Cooperative   Orientation:   All  Speech   Rate / Production: Normal    Volume:  Normal   Mood:    Normal  Affect:    Appropriate   Thought Content:  Clear  with forgetfulness   Thought Form:  Coherent  Logical   Insight:    Fair   Attention Span and Concentration:  limited  See [facility] notes     Diagnostic Test Results:  Pending.      ASSESSMENT/PLAN:                                                    (M54.5,  G89.29) Chronic low back pain, unspecified back pain laterality, with sciatica presence unspecified  (primary encounter diagnosis)  Comment: Patient on chronic pain medication. Needs a new controlled substance agreement signed today and a urine specimen for toxicology.   Plan: Urine Drugs of Abuse Screen Panel 13          (I10) Hypertension goal BP (blood pressure) < 140/90  Comment: See above. Denies any symptoms.   BP Readings from Last 3 Encounters:   04/10/18 142/68   03/12/18 112/78   03/05/18 172/79     Plan: Continue with BP medications with no changes.     (E11.22,  N18.4,  Z79.4) Type 2 diabetes mellitus with stage 4 chronic kidney disease, with long-term current use of insulin (H)  Comment: Noted above.   Lab Results   Component Value Date    A1C 6.0 01/16/2018    A1C 6.6 10/25/2017    A1C 6.4 09/07/2017    A1C 6.6 05/10/2017    A1C 6.3 12/13/2016     Plan: -Continue with daily Lantus.   -Continue eating healthy and exercising.       Patient Instructions   -Refills done.   -Scheduled Pelvis CT.    I spent 25 min spent in direct face to face time with Kim Anne, greater than 50% in counseling and coordination of care for:  Hypertension, diabetes and chronic pain management.       JUNIOR Lopez CNP  [facility]

## 2018-04-10 NOTE — MR AVS SNAPSHOT
After Visit Summary   4/10/2018    Kim Anne    MRN: [id number]           Patient Information     Date Of Birth          1945        Visit Information        Provider Department      4/10/2018 8:30 AM Carmen Elder, [facility] MTM        Today's Diagnoses     Essential hypertension    -  1    Chronic kidney disease, stage 4 (severe) (H)        Hyperkalemia        Edema, unspecified type        Acidosis        CKD (chronic kidney disease) stage 4, GFR 15-29 ml/min (H)        Pulmonary emphysema, unspecified emphysema type (H)        Iron deficiency anemia, unspecified iron deficiency anemia type        Encounter for smoking cessation counseling        Slow transit constipation        Type 2 diabetes mellitus without complication, with long-term current use of insulin (H)          Care Instructions    See AVS from PCP encounter for details           Follow-ups after your visit        Your next 10 appointments already scheduled     May 08, 2018 10:00 AM CDT   Return Visit with JUNIOR Bishop CNP   [facility] ([facility])    [address] [place] [address]  [address]  [facility] [address] [phone]            May 08, 2018 10:00 AM CDT   Return Visit with Aviva Lackey [facility] ([facility])    [address] [place] [address]  [address] [place] [id number] [phone]            May 08, 2018 10:00 AM CDT   SHORT with Carmen Elder [facility] [place] ([facility])    [address] [place] [address]  [address]  [facility] [address]   [phone]            May 16, 2018 10:30 AM CDT   Lab with  LAB   [facility] ([facility])    [address]  1st Floor  [facility] [id number] [phone]            May 16, 2018 11:30 AM  "CDT   (Arrive by 11:00 AM)   Return Visit with Wendy Espinal PA-C   [facility] ([facility])    [address]  [address]  [facility] [address] [phone]            Jul 27, 2018  9:15 AM CDT   Lab with UC LAB   [facility] ([facility])    [address]  1st Floor  [facility] [id number] [phone]            Jul 27, 2018 10:10 AM CDT   (Arrive by 9:40 AM)   Return Visit with Bhargav Lopes MD   [facility] ([facility])    [address]  [address]  [facility] [address] [phone]              Who to contact     If you have questions or need follow up information about today's clinic visit or your schedule please contact [facility] directly at [phone].  Normal or non-critical lab and imaging results will be communicated to you by [place] [employer], letter or phone within 4 business days after the clinic has received the results. If you do not hear from us within 7 days, please contact the clinic through [employer] or phone. If you have a critical or abnormal lab result, we will notify you by phone as soon as possible.  Submit refill requests through [employer] or call your pharmacy and they will forward the refill request to us. Please allow 3 business days for your refill to be completed.          Additional Information About Your Visit        [employer] [employer] Information     [employer] lets you send messages to your doctor, view your test results, renew your prescriptions, schedule appointments and more. To sign up, go to [email or web address] . Click on \"Log in\" on the left side of the screen, which will take you to the Welcome page. Then click on \"Sign up Now\" on the right side of the page.     You will be asked to enter the access code listed below, as well as some personal information. Please follow the directions to create your username and password.   "   Your access code is: [email or web address]  Expires: 6/3/2018 12:06 PM     Your access code will  in 90 days. If you need help or a new code, please call your [facility] clinic or [phone].        Care EveryWhere ID     This is your Care EveryWhere ID. This could be used by other organizations to access your [facility] medical records  [id number]         Blood Pressure from Last 3 Encounters:   18 125/63   04/10/18 142/68   18 112/78    Weight from Last 3 Encounters:   18 166 lb (75.3 kg)   04/10/18 164 lb (74.4 kg)   18 168 lb (76.2 kg)              Today, you had the following     No orders found for display         Today's Medication Changes          These changes are accurate as of 4/10/18 11:59 PM.  If you have any questions, ask your nurse or doctor.               These medicines have changed or have updated prescriptions.        Dose/Directions    hydrALAZINE 25 MG tablet   Commonly known as:  APRESOLINE   This may have changed:    - how much to take  - when to take this   Used for:  Essential hypertension, Chronic kidney disease, stage 4 (severe) (H)        Dose:  25 mg   Take 1 tablet (25 mg) by mouth 2 times daily   Quantity:  60 tablet   Refills:  1       LASIX 40 MG tablet   This may have changed:  when to take this   Used for:  Hyperkalemia, Edema, unspecified type   Generic drug:  furosemide   Changed by:  Carmen Elder RPH        Dose:  40 mg   Take 1 tablet (40 mg) by mouth daily   Quantity:  60 tablet   Refills:  3       MIRALAX powder   This may have changed:    - when to take this  - reasons to take this   Used for:  Slow transit constipation   Generic drug:  polyethylene glycol   Changed by:  Carmen Elder RPH        Dose:  1 capful   Take 17 g (1 capful) by mouth daily as needed for constipation   Quantity:  510 g   Refills:  2       sodium bicarbonate 325 MG tablet   This may have changed:    - how much to take  - when to take this   Used for:   Acidosis, CKD (chronic kidney disease) stage 4, GFR 15-29 ml/min (H)   Changed by:  Carmen Elder RPH        Dose:  325 mg   Take 1 tablet (325 mg) by mouth 2 times daily   Quantity:  180 tablet   Refills:  3            Where to get your medicines      These medications were sent to [facility] - [place], [place] - [address]  [address] [facility] [address]     Phone:  [phone]     ferrous sulfate 325 (65 Fe) MG tablet    tiotropium 18 MCG capsule                Primary Care Provider Office Phone # Fax #    JUNIOR Bishop [phone]       [address] [facility] [address]  [facility] [address]        Equal Access to Services     CECIL TOLLIVER : Stephany downingo Soluiz, waaxda luqadaha, qaybta kaalmada adeferminyafahad, kassandra white . So [facility] [phone].    ATENCIÓN: Si habla español, tiene a bailey disposición servicios gratuitos de asistencia lingüística. Llame al [phone].    We comply with applicable federal civil rights laws and [place] laws. We do not discriminate on the basis of race, color, national origin, age, disability, sex, sexual orientation, or gender identity.            Thank you!     Thank you for choosing [facility]  for your care. Our goal is always to provide you with excellent care. Hearing back from our patients is one way we can continue to improve our services. Please take a few minutes to complete the written survey that you may receive in the mail after your visit with us. Thank you!             Your Updated Medication List - Protect others around you: Learn how to safely use, store and throw away your medicines at www.disposemymeds.org.          This list is accurate as of 4/10/18 11:59 PM.  Always use your most recent med list.                   Brand Name Dispense Instructions for use Diagnosis    albuterol 108 (90 Base) MCG/ACT Inhaler    PROAIR HFA/PROVENTIL  HFA/VENTOLIN HFA    18 g    Inhale 2 puffs into the lungs every 6 hours as needed for shortness of breath / dyspnea or wheezing    Chronic obstructive pulmonary disease, unspecified COPD type (H)       amLODIPine 5 MG tablet    NORVASC    60 tablet    Take 2 tablets (10 mg) by mouth daily    Renovascular hypertension       ASPIR-LOW 81 MG EC tablet   Generic drug:  aspirin     120 tablet    TAKE 1 TABLET BY MOUTH EVERY DAY    History of MI (myocardial infarction), Essential hypertension, benign       atorvastatin 40 MG tablet    LIPITOR    90 tablet    Take 1 tablet (40 mg) by mouth daily    Hyperlipidemia LDL goal <100       blood glucose monitoring lancets     1 Box    Test BS four times daily as directed    Type 2 diabetes mellitus without complication, with long-term current use of insulin (H)       blood glucose monitoring test strip    FREESTYLE LITE    50 strip    TEST BLOOD SUGAR TWO TIMES A DAY    Type 2 diabetes mellitus without complication, with long-term current use of insulin (H)       docusate sodium 100 MG capsule    COLACE    60 capsule    Take 1 capsule (100 mg) by mouth 2 times daily    Constipation, unspecified constipation type       EUCERIN CALMING DAILY MOIST Crea     1 Tube    Externally apply 1 dose * topically daily    Type II or unspecified type diabetes mellitus with neurological manifestations, not stated as uncontrolled(250.60) (H)       ferrous sulfate 325 (65 Fe) MG tablet    IRON    100 tablet    Take 1 tablet (325 mg) by mouth daily (with breakfast)    Iron deficiency anemia, unspecified iron deficiency anemia type       fluticasone 50 MCG/ACT spray    FLONASE    1 Bottle    Spray 1-2 sprays into both nostrils daily    Acute nasopharyngitis       hydrALAZINE 25 MG tablet    APRESOLINE    60 tablet    Take 1 tablet (25 mg) by mouth 2 times daily    Essential hypertension, Chronic kidney disease, stage 4 (severe) (H)       insulin glargine 100 UNIT/ML injection    LANTUS    15 mL     Inject 10 Units Subcutaneous every morning    Controlled type 2 diabetes mellitus with stage 4 chronic kidney disease, with long-term current use of insulin (H)       insulin pen needle 31G X 6 MM     120 each    Use as directed    Type 2 diabetes mellitus without complication (H)       LASIX 40 MG tablet   Generic drug:  furosemide     60 tablet    Take 1 tablet (40 mg) by mouth daily    Hyperkalemia, Edema, unspecified type       levothyroxine 200 MCG tablet    SYNTHROID/LEVOTHROID    30 tablet    TAKE 1 TABLET BY MOUTH EVERY DAY    Other specified hypothyroidism       metoprolol succinate 25 MG 24 hr tablet    TOPROL-XL    90 tablet    Take 1 tablet (25 mg) by mouth daily    Hypertension associated with diabetes (H)       MIRALAX powder   Generic drug:  polyethylene glycol     510 g    Take 17 g (1 capful) by mouth daily as needed for constipation    Slow transit constipation       mometasone-formoterol 100-5 MCG/ACT oral inhaler    DULERA    1 Inhaler    Inhale 2 puffs into the lungs 2 times daily    COPD (chronic obstructive pulmonary disease) (H)       nicotine polacrilex 2 MG lozenge    COMMIT    100 lozenge    Dissolve 1 lozenge orally every 4 hours as directed.    Chronic obstructive pulmonary disease, unspecified COPD type (H)       nitroGLYcerin 0.4 MG sublingual tablet    NITROSTAT    25 tablet    Place 1 tablet (0.4 mg) under the tongue every 5 minutes as needed for chest pain    History of MI (myocardial infarction)       nystatin 064716 UNIT/GM Powd    MYCOSTATIN    30 g    Apply 1 g topically 3 times daily as needed    Groin rash       * order for DME     1 Device    One wheeled walker with seat and brakes and basket    Risk for falls       * order for DME     2 Device    Equipment being ordered: two pairs moderate knee high support hose    Edema, unspecified type       * order for DME     2 each    Equipment being ordered: Compression socks. Strength:15-20 mmHg    Localized edema       oxyCODONE IR  10 MG tablet    ROXICODONE    90 tablet    Take 1 tablet (10 mg) by mouth every 8 hours as needed    Chronic low back pain without sciatica, unspecified back pain laterality       SM ALCOHOL PREP 70 % Pads     100 each    Externally apply 1 pad topically 4 times daily    Type 2 diabetes mellitus without complication, with long-term current use of insulin (H)       sodium bicarbonate 325 MG tablet     180 tablet    Take 1 tablet (325 mg) by mouth 2 times daily    Acidosis, CKD (chronic kidney disease) stage 4, GFR 15-29 ml/min (H)       tiotropium 18 MCG capsule    SPIRIVA HANDIHALER    90 capsule    Inhale contents of one capsule daily.    Pulmonary emphysema, unspecified emphysema type (H)       * Notice:  This list has 3 medication(s) that are the same as other medications prescribed for you. Read the directions carefully, and ask your doctor or other care provider to review them with you.

## 2018-04-10 NOTE — MR AVS SNAPSHOT
After Visit Summary   4/10/2018    Kim Anne    MRN: [id number]           Patient Information     Date Of Birth          1945        Visit Information        Provider Department      4/10/2018 11:00 AM Ailyn Nieves APRN CNP [facility]        Today's Diagnoses     Chronic low back pain, unspecified back pain laterality, with sciatica presence unspecified    -  1    Hypertension goal BP (blood pressure) < 140/90        CKD (chronic kidney disease) stage 4, GFR 15-29 ml/min (H)          Care Instructions    -Refills done.   -Scheduled Pelvis CT.          Follow-ups after your visit        Your next 10 appointments already scheduled     Apr 10, 2018 11:00 AM CDT   Return Visit with JUNIOR Bishop CNP   [facility] ([facility])    [address]  [address]  [facility] [id number] [phone]            Apr 10, 2018 11:00 AM CDT   Return Visit with LAMINE Chahal   [facility] ([facility])    [address]  [address]  [facility] [id number] [phone]            Apr 11, 2018 10:00 AM CDT   Lab with  LAB   [facility] ([facility])    [address]  1st Floor  [facility] [id number] [phone]            Apr 11, 2018 11:00 AM CDT   (Arrive by 10:30 AM)   Return Visit with Wendy Espinal PA-C   [facility] ([facility])    [address]  [address]  [facility] [id number] [phone]            Apr 12, 2018 11:00 AM CDT   (Arrive by 10:45 AM)   CT PELVIS W/O CONTRAST with URCT[facility]   [facility]    [facility] [address] [phone]           Please bring any scans or X-rays taken at other hospitals, if similar tests were done.  Also bring a list of your medicines, including vitamins, minerals and over-the-counter drugs. It is safest to leave personal items at home.  Be sure to tell your doctor:   If you have any allergies.   If there s any chance you are pregnant.   If you are breastfeeding.  You may have contrast for this exam. To prepare:   Do not eat or drink for 2 hours before your exam. If you need to take medicine, you may take it with small sips of water. (We may ask you to take liquid medicine as well.)   The day before your exam, drink extra fluids at least six 8-ounce glasses (unless your doctor tells you to restrict your fluids).   You will be given instructions on how to drink the contrast.  Patients over 70 or patients with diabetes or kidney problems:   If you haven t had a blood test (creatinine test) within the last 30 days, the Cardiologist/Radiologist may require you to get this test prior to your exam.  If you have diabetes:   Continue to take your metformin medication on the day of your exam  Please wear loose clothing, such as a sweat suit or jogging clothes. Avoid snaps, zippers and other metal. We may ask you to undress and put on a hospital gown.  If you have any questions, please call the Imaging Department where you will have your exam.              Who to contact     If you have questions or need follow up information about today's clinic visit or your schedule please contact [facility] directly at [phone].  Normal or non-critical lab and imaging results will be communicated to you by MyChart, letter or phone within 4 business days after the clinic has received the results. If you do not hear from us within 7 days, please contact the clinic through [employer] or phone. If you have a critical or abnormal lab result, we will notify you by phone as soon as possible.  Submit refill requests through [employer] or call your pharmacy and they will forward the refill request to us. Please  "allow 3 business days for your refill to be completed.          Additional Information About Your Visit        MyChart Information     [employer] lets you send messages to your doctor, view your test results, renew your prescriptions, schedule appointments and more. To sign up, go to [email or web address] . Click on \"Log in\" on the left side of the screen, which will take you to the Welcome page. Then click on \"Sign up Now\" on the right side of the page.     You will be asked to enter the access code listed below, as well as some personal information. Please follow the directions to create your username and password.     Your access code is: [email or web address]  Expires: 6/3/2018 12:06 PM     Your access code will  in 90 days. If you need help or a new code, please call your [place] clinic or [phone].        Care EveryWhere ID     This is your Care EveryWhere ID. This could be used by other organizations to access your [place] medical records  [id number]        Your Vitals Were     Pulse Temperature Respirations Pulse Oximetry BMI (Body Mass Index)       85 98.4  F (36.9  C) (Oral) 16 97% 26.47 kg/m2        Blood Pressure from Last 3 Encounters:   04/10/18 142/68   18 112/78   18 172/79    Weight from Last 3 Encounters:   04/10/18 164 lb (74.4 kg)   18 168 lb (76.2 kg)   18 168 lb 14.4 oz (76.6 kg)              We Performed the Following     Urine Drugs of Abuse Screen Panel 13          Today's Medication Changes          These changes are accurate as of 4/10/18  9:09 AM.  If you have any questions, ask your nurse or doctor.               These medicines have changed or have updated prescriptions.        Dose/Directions    hydrALAZINE 25 MG tablet   Commonly known as:  APRESOLINE   This may have changed:    - how much to take  - when to take this   Used for:  Essential hypertension, Chronic kidney disease, stage 4 (severe) (H)        Dose:  25 mg   Take 1 tablet (25 mg) by mouth 2 " times daily   Quantity:  60 tablet   Refills:  1       LASIX 40 MG tablet   This may have changed:  when to take this   Used for:  Hyperkalemia, Edema, unspecified type   Generic drug:  furosemide   Changed by:  Carmen Elder RPH        Dose:  40 mg   Take 1 tablet (40 mg) by mouth daily   Quantity:  60 tablet   Refills:  3       sodium bicarbonate 325 MG tablet   This may have changed:    - how much to take  - when to take this   Used for:  Acidosis, CKD (chronic kidney disease) stage 4, GFR 15-29 ml/min (H)   Changed by:  Carmen Elder RPH        Dose:  325 mg   Take 1 tablet (325 mg) by mouth 2 times daily   Quantity:  180 tablet   Refills:  3            Where to get your medicines      These medications were sent to [facility] [place], [place] - [address]  [address] [facility] [address]     Phone:  [phone]     ferrous sulfate 325 (65 FE) MG tablet    tiotropium 18 MCG capsule                Primary Care Provider Office Phone # Fax #    JUNIOR Bishop [phone]       [address] [facility] [address]  [facility] [address]        Equal Access to Services     CECIL TOLLIVER : Hadii alysha downingo Soluiz, waaxda luqadaha, qaybta kaalmada adeferminyada, kassandra white . So [facility] [phone].    ATENCIÓN: Si habla español, tiene a bailey disposición servicios gratuitos de asistencia lingüística. [facility] [phone].    We comply with applicable federal civil rights laws and [place] laws. We do not discriminate on the basis of race, color, national origin, age, disability, sex, sexual orientation, or gender identity.            Thank you!     Thank you for choosing [facility]  for your care. Our goal is always to provide you with excellent care. Hearing back from our patients is one way we can continue to improve our services. Please take a few minutes to complete the written survey that  you may receive in the mail after your visit with us. Thank you!             Your Updated Medication List - Protect others around you: Learn how to safely use, store and throw away your medicines at www.disposemymeds.org.          This list is accurate as of 4/10/18  9:09 AM.  Always use your most recent med list.                   Brand Name Dispense Instructions for use Diagnosis    albuterol 108 (90 BASE) MCG/ACT Inhaler    PROAIR HFA/PROVENTIL HFA/VENTOLIN HFA    18 g    Inhale 2 puffs into the lungs every 6 hours as needed for shortness of breath / dyspnea or wheezing    Chronic obstructive pulmonary disease, unspecified COPD type (H)       amLODIPine 5 MG tablet    NORVASC    60 tablet    Take 2 tablets (10 mg) by mouth daily    Renovascular hypertension       ASPIR-LOW 81 MG EC tablet   Generic drug:  aspirin     120 tablet    TAKE 1 TABLET BY MOUTH EVERY DAY    History of MI (myocardial infarction), Essential hypertension, benign       atorvastatin 40 MG tablet    LIPITOR    90 tablet    Take 1 tablet (40 mg) by mouth daily    Hyperlipidemia LDL goal <100       blood glucose monitoring lancets     1 Box    Test BS four times daily as directed    Type 2 diabetes mellitus without complication, with long-term current use of insulin (H)       blood glucose monitoring test strip    FREESTYLE LITE    50 strip    TEST BLOOD SUGAR TWO TIMES A DAY    Type 2 diabetes mellitus without complication, with long-term current use of insulin (H)       docusate sodium 100 MG capsule    COLACE    60 capsule    Take 1 capsule (100 mg) by mouth 2 times daily    Constipation, unspecified constipation type       EUCERIN CALMING DAILY MOIST Crea     1 Tube    Externally apply 1 dose * topically daily    Type II or unspecified type diabetes mellitus with neurological manifestations, not stated as uncontrolled(250.60) (H)       ferrous sulfate 325 (65 FE) MG tablet    IRON    100 tablet    Take 1 tablet (325 mg) by mouth daily (with  breakfast)    Iron deficiency anemia, unspecified iron deficiency anemia type       fluticasone 50 MCG/ACT spray    FLONASE    1 Bottle    Spray 1-2 sprays into both nostrils daily    Acute nasopharyngitis       hydrALAZINE 25 MG tablet    APRESOLINE    60 tablet    Take 1 tablet (25 mg) by mouth 2 times daily    Essential hypertension, Chronic kidney disease, stage 4 (severe) (H)       insulin glargine 100 UNIT/ML injection    LANTUS    15 mL    Inject 10 Units Subcutaneous every morning    Controlled type 2 diabetes mellitus with stage 4 chronic kidney disease, with long-term current use of insulin (H)       insulin pen needle 31G X 6 MM     120 each    Use as directed    Type 2 diabetes mellitus without complication (H)       LASIX 40 MG tablet   Generic drug:  furosemide     60 tablet    Take 1 tablet (40 mg) by mouth daily    Hyperkalemia, Edema, unspecified type       levothyroxine 200 MCG tablet    SYNTHROID/LEVOTHROID    30 tablet    TAKE 1 TABLET BY MOUTH EVERY DAY    Other specified hypothyroidism       metoprolol succinate 25 MG 24 hr tablet    TOPROL-XL    90 tablet    Take 1 tablet (25 mg) by mouth daily    Hypertension associated with diabetes (H)       mometasone-formoterol 100-5 MCG/ACT oral inhaler    DULERA    1 Inhaler    Inhale 2 puffs into the lungs 2 times daily    COPD (chronic obstructive pulmonary disease) (H)       nicotine polacrilex 2 MG lozenge    COMMIT    100 lozenge    Dissolve 1 lozenge orally every 4 hours as directed.    Chronic obstructive pulmonary disease, unspecified COPD type (H)       nitroGLYcerin 0.4 MG sublingual tablet    NITROSTAT    25 tablet    Place 1 tablet (0.4 mg) under the tongue every 5 minutes as needed for chest pain    History of MI (myocardial infarction)       nystatin 816651 UNIT/GM Powd    MYCOSTATIN    30 g    Apply 1 g topically 3 times daily as needed    Groin rash       * order for DME     1 Device    One wheeled walker with seat and brakes and basket     Risk for falls       * order for DME     2 Device    Equipment being ordered: two pairs moderate knee high support hose    Edema, unspecified type       * order for DME     2 each    Equipment being ordered: Compression socks. Strength:15-20 mmHg    Localized edema       oxyCODONE IR 10 MG tablet    ROXICODONE    90 tablet    Take 1 tablet (10 mg) by mouth every 8 hours as needed    Chronic low back pain without sciatica, unspecified back pain laterality       polyethylene glycol powder    MIRALAX    510 g    Take 17 g (1 capful) by mouth daily    Slow transit constipation       SM ALCOHOL PREP 70 % Pads     100 each    Externally apply 1 pad topically 4 times daily    Type 2 diabetes mellitus without complication, with long-term current use of insulin (H)       sodium bicarbonate 325 MG tablet     180 tablet    Take 1 tablet (325 mg) by mouth 2 times daily    Acidosis, CKD (chronic kidney disease) stage 4, GFR 15-29 ml/min (H)       tiotropium 18 MCG capsule    SPIRIVA HANDIHALER    90 capsule    Inhale contents of one capsule daily.    Pulmonary emphysema, unspecified emphysema type (H)       vitamin D 2000 UNITS tablet     60 tablet    Take 2,000 Units by mouth daily    Vitamin D deficiency disease       * Notice:  This list has 3 medication(s) that are the same as other medications prescribed for you. Read the directions carefully, and ask your doctor or other care provider to review them with you.

## 2018-04-11 ENCOUNTER — OFFICE VISIT (OUTPATIENT)
Dept: NEPHROLOGY | Facility: CLINIC | Age: 73
End: 2018-04-11
Attending: PHYSICIAN ASSISTANT
Payer: COMMERCIAL

## 2018-04-11 ENCOUNTER — PATIENT OUTREACH (OUTPATIENT)
Dept: GERIATRIC MEDICINE | Facility: CLINIC | Age: 73
End: 2018-04-11

## 2018-04-11 VITALS
BODY MASS INDEX: 26.79 KG/M2 | OXYGEN SATURATION: 95 % | HEART RATE: 56 BPM | SYSTOLIC BLOOD PRESSURE: 125 MMHG | DIASTOLIC BLOOD PRESSURE: 63 MMHG | WEIGHT: 166 LBS | TEMPERATURE: 98.6 F

## 2018-04-11 DIAGNOSIS — G89.4 CHRONIC PAIN SYNDROME: ICD-10-CM

## 2018-04-11 DIAGNOSIS — E87.20 ACIDOSIS: ICD-10-CM

## 2018-04-11 DIAGNOSIS — E55.9 VITAMIN D DEFICIENCY DISEASE: ICD-10-CM

## 2018-04-11 DIAGNOSIS — N18.4 CKD (CHRONIC KIDNEY DISEASE) STAGE 4, GFR 15-29 ML/MIN (H): ICD-10-CM

## 2018-04-11 DIAGNOSIS — E55.9 VITAMIN D DEFICIENCY: Primary | ICD-10-CM

## 2018-04-11 LAB
ALBUMIN SERPL-MCNC: 2.1 G/DL (ref 3.4–5)
ANION GAP SERPL CALCULATED.3IONS-SCNC: 10 MMOL/L (ref 3–14)
BUN SERPL-MCNC: 43 MG/DL (ref 7–30)
CALCIUM SERPL-MCNC: 8 MG/DL (ref 8.5–10.1)
CHLORIDE SERPL-SCNC: 116 MMOL/L (ref 94–109)
CO2 SERPL-SCNC: 18 MMOL/L (ref 20–32)
CREAT SERPL-MCNC: 3.84 MG/DL (ref 0.52–1.04)
ERYTHROCYTE [DISTWIDTH] IN BLOOD BY AUTOMATED COUNT: 15.4 % (ref 10–15)
GFR SERPL CREATININE-BSD FRML MDRD: 12 ML/MIN/1.7M2
GLUCOSE SERPL-MCNC: 99 MG/DL (ref 70–99)
HCT VFR BLD AUTO: 39.5 % (ref 35–47)
HGB BLD-MCNC: 12.1 G/DL (ref 11.7–15.7)
MCH RBC QN AUTO: 28 PG (ref 26.5–33)
MCHC RBC AUTO-ENTMCNC: 30.6 G/DL (ref 31.5–36.5)
MCV RBC AUTO: 91 FL (ref 78–100)
PHOSPHATE SERPL-MCNC: 4.5 MG/DL (ref 2.5–4.5)
PLATELET # BLD AUTO: 333 10E9/L (ref 150–450)
POTASSIUM SERPL-SCNC: 4.9 MMOL/L (ref 3.4–5.3)
RBC # BLD AUTO: 4.32 10E12/L (ref 3.8–5.2)
SODIUM SERPL-SCNC: 143 MMOL/L (ref 133–144)
WBC # BLD AUTO: 6.3 10E9/L (ref 4–11)

## 2018-04-11 PROCEDURE — 85027 COMPLETE CBC AUTOMATED: CPT | Performed by: PHYSICIAN ASSISTANT

## 2018-04-11 PROCEDURE — 36415 COLL VENOUS BLD VENIPUNCTURE: CPT | Performed by: PHYSICIAN ASSISTANT

## 2018-04-11 PROCEDURE — 80069 RENAL FUNCTION PANEL: CPT | Performed by: PHYSICIAN ASSISTANT

## 2018-04-11 PROCEDURE — G0463 HOSPITAL OUTPT CLINIC VISIT: HCPCS | Mod: ZF

## 2018-04-11 PROCEDURE — 80307 DRUG TEST PRSMV CHEM ANLYZR: CPT | Performed by: NURSE PRACTITIONER

## 2018-04-11 RX ORDER — SODIUM BICARBONATE 325 MG/1
650 TABLET ORAL 2 TIMES DAILY
Qty: 360 TABLET | Refills: 3 | Status: SHIPPED | OUTPATIENT
Start: 2018-04-11 | End: 2019-06-19

## 2018-04-11 RX ORDER — ERGOCALCIFEROL 1.25 MG/1
50000 CAPSULE, LIQUID FILLED ORAL
Qty: 8 CAPSULE | Refills: 0 | Status: SHIPPED | OUTPATIENT
Start: 2018-04-11 | End: 2018-05-31

## 2018-04-11 RX ORDER — CHOLECALCIFEROL (VITAMIN D3) 50 MCG
2000 TABLET ORAL DAILY
Qty: 60 TABLET | Refills: 2 | Status: SHIPPED | OUTPATIENT
Start: 2018-04-11 | End: 2018-08-16

## 2018-04-11 ASSESSMENT — PAIN SCALES - GENERAL: PAINLEVEL: SEVERE PAIN (6)

## 2018-04-11 NOTE — PROGRESS NOTES
[facility] Care Coordination Contact  Arranged transportation thru [facility] for the below appt:  Appt Date & Time: 04/12 at 11am  Clinic Name & Address:   Radiology ([address] [place] [address])  Transportation Provider: [employer]   time:  10am    Notified member of  time.    Luci Godinez  CMS Supervisor  [facility]  [phone]

## 2018-04-11 NOTE — PATIENT INSTRUCTIONS
1.  Start taking Ergocalciferol 50,000 units once weekly.  Do this for 8 weeks.  Hold Cholecalciferol until you have completed the 8 weeks, then begin taking Cholecalciferol 2000 units once a day.    2.  Keep up good work of quitting smoking.    3. Blood pressures is in good range and no edema. No changes to blood pressure meds.    4.  Let us know if you start to have nausea, vomiting, itching, confusion or slowed thinking, funny tastes of food, diarrhea, difficulty or decreased urine output, trouble breathing or swelling of hands/legs/face.

## 2018-04-11 NOTE — NURSING NOTE
"Chief Complaint   Patient presents with     RECHECK     CKD        Initial /63  Pulse 56  Temp 98.6  F (37  C) (Oral)  Wt 75.3 kg (166 lb)  SpO2 95%  BMI 26.79 kg/m2 Estimated body mass index is 26.79 kg/(m^2) as calculated from the following:    Height as of 2/16/18: 1.676 m (5' 6\").    Weight as of this encounter: 75.3 kg (166 lb).  Medication Reconciliation: complete   Shivani Jefferson, CMA      "
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
## 2018-04-11 NOTE — MR AVS SNAPSHOT
After Visit Summary   4/11/2018    Kim Anne    MRN: [id number]           Patient Information     Date Of Birth          1945        Visit Information        Provider Department      4/11/2018 11:00 AM Wendy Espinal PA-C [facility]        Today's Diagnoses     Vitamin D deficiency    -  1    Acidosis        CKD (chronic kidney disease) stage 4, GFR 15-29 ml/min (H)        Vitamin D deficiency disease          Care Instructions    1.  Start taking Ergocalciferol 50,000 units once weekly.  Do this for 8 weeks.  Hold Cholecalciferol until you have completed the 8 weeks, then begin taking Cholecalciferol 2000 units once a day.    2.  Keep up good work of quitting smoking.    3. Blood pressures is in good range and no edema. No changes to blood pressure meds.    4.  Let us know if you start to have nausea, vomiting, itching, confusion or slowed thinking, funny tastes of food, diarrhea, difficulty or decreased urine output, trouble breathing or swelling of hands/legs/face.          Follow-ups after your visit        Follow-up notes from your care team     Return in about 1 month (around 5/11/2018).      Your next 10 appointments already scheduled     Apr 12, 2018 11:00 AM CDT   (Arrive by 10:45 AM)   CT PELVIS W/O CONTRAST with URCT1   [facility], [place], Radiology ([facility])    [address] [place] [id number] [phone]           Please bring any scans or X-rays taken at other hospitals, if similar tests were done. Also bring a list of your medicines, including vitamins, minerals and over-the-counter drugs. It is safest to leave personal items at home.  Be sure to tell your doctor:   If you have any allergies.   If there s any chance you are pregnant.   If you are breastfeeding.  You may have contrast for this exam. To prepare:   Do not eat or drink for 2 hours before your exam. If you need to take medicine, you  may take it with small sips of water. (We may ask you to take liquid medicine as well.)   The day before your exam, drink extra fluids at least six 8-ounce glasses (unless your doctor tells you to restrict your fluids).   You will be given instructions on how to drink the contrast.  Patients over 70 or patients with diabetes or kidney problems:   If you haven t had a blood test (creatinine test) within the last 30 days, the Cardiologist/Radiologist may require you to get this test prior to your exam.  If you have diabetes:   Continue to take your metformin medication on the day of your exam  Please wear loose clothing, such as a sweat suit or jogging clothes. Avoid snaps, zippers and other metal. We may ask you to undress and put on a hospital gown.  If you have any questions, please call the Imaging Department where you will have your exam.            May 08, 2018 10:00 AM CDT   Return Visit with JUNIOR Bishop CNP   [facility] ([facility])    [address] [place] [address]  [address] [place] [id number]   [phone]            May 08, 2018 10:00 AM CDT   Return Visit with Aviva [facility] ([facility])    [address] [place] [address]  [address] [place] [phone]   [phone]            May 08, 2018 10:00 AM CDT   SHORT with Carmen Elder [facility] [place] ([facility])    [address] [place] [address]  [address] [place] [address]   [phone]            May 16, 2018 10:30 AM CDT   Lab with  LAB   [facility] ([facility])    [address] [place] [address] Se  1st Floor  [facility] [id number] [phone]            May 16, 2018 11:30 AM CDT   (Arrive by 11:00 AM)   Return Visit with Wendy Espinal PA-C   [facility] ([facility])    [address] [place] [address]  "  [address]  [facility] [id number] [phone]            2018  9:15 AM CDT   Lab with  LAB   [facility] ([facility])    [address] [place] [address]  1st Floor  [facility] [id number] [phone]            2018 10:10 AM CDT   (Arrive by 9:40 AM)   Return Visit with Bhargav Lopes MD   [facility] ([facility])    [address] [place] [address]  [address] [place] [id number] [phone]              Who to contact     If you have questions or need follow up information about today's clinic visit or your schedule please contact [facility]PH[facility] directly at [phone].  Normal or non-critical lab and imaging results will be communicated to you by MyChart, letter or phone within 4 business days after the clinic has received the results. If you do not hear from us within 7 days, please contact the clinic through MyChart or phone. If you have a critical or abnormal lab result, we will notify you by phone as soon as possible.  Submit refill requests through [employer] or call your pharmacy and they will forward the refill request to us. Please allow 3 business days for your refill to be completed.          Additional Information About Your Visit        [employer] Information     [employer] lets you send messages to your doctor, view your test results, renew your prescriptions, schedule appointments and more. To sign up, go to [email or web address] . Click on \"Log in\" on the left side of the screen, which will take you to the Welcome page. Then click on \"Sign up Now\" on the right side of the page.     You will be asked to enter the access code listed below, as well as some personal information. Please follow the directions to create your username and password.     Your access code is: [email or web address]  Expires: 6/3/2018 12:06 PM     Your access code will  in 90 days. If you need help or a new code, please call your [place] clinic or " [phone].        Care EveryWhere ID     This is your Care EveryWhere ID. This could be used by other organizations to access your [facility] medical records  [id number]        Your Vitals Were     Pulse Temperature Pulse Oximetry BMI (Body Mass Index)          56 98.6  F (37  C) (Oral) 95% 26.79 kg/m2         Blood Pressure from Last 3 Encounters:   04/11/18 125/63   04/10/18 142/68   03/12/18 112/78    Weight from Last 3 Encounters:   04/11/18 75.3 kg (166 lb)   04/10/18 74.4 kg (164 lb)   03/12/18 76.2 kg (168 lb)              Today, you had the following     No orders found for display         Today's Medication Changes          These changes are accurate as of 4/11/18 12:45 PM.  If you have any questions, ask your nurse or doctor.               Start taking these medicines.        Dose/Directions    vitamin D 11632 UNIT capsule   Commonly known as:  ERGOCALCIFEROL   Used for:  Vitamin D deficiency   Started by:  Wendy Espinal PA-C        Dose:  07286 Units   Take 1 capsule (50,000 Units) by mouth every 7 days for 8 doses   Quantity:  8 capsule   Refills:  0         These medicines have changed or have updated prescriptions.        Dose/Directions    sodium bicarbonate 325 MG tablet   This may have changed:  how much to take   Used for:  Acidosis, CKD (chronic kidney disease) stage 4, GFR 15-29 ml/min (H)   Changed by:  Wnedy Espinal PA-C        Dose:  650 mg   Take 2 tablets (650 mg) by mouth 2 times daily   Quantity:  360 tablet   Refills:  3            Where to get your medicines      These medications were sent to [facility] [place] - [address]  [address] [facility] [place] [address]     Phone:  [phone]     sodium bicarbonate 325 MG tablet    vitamin D 2000 units tablet    vitamin D 73463 UNIT capsule                Primary Care Provider Office Phone # Fax #    JUNIOR Bishop [phone]       [address] [facility] [address] [facility]  [address]  [facility] [address]        Equal Access to Services     CECIL TOLLIVER : Hadii alysha redd changjanet Katyali, wadellada luqadaha, qaybta katannakassandra dowconchitaanna shoemaker. So [facility] [phone].    ATENCIÓN: Si habla español, tiene a bailey disposición servicios gratuitos de asistencia lingüística. Vicenteame al [phone].    We comply with applicable federal civil rights laws and [place] laws. We do not discriminate on the basis of race, color, national origin, age, disability, sex, sexual orientation, or gender identity.            Thank you!     Thank you for choosing [facility]  for your care. Our goal is always to provide you with excellent care. Hearing back from our patients is one way we can continue to improve our services. Please take a few minutes to complete the written survey that you may receive in the mail after your visit with us. Thank you!             Your Updated Medication List - Protect others around you: Learn how to safely use, store and throw away your medicines at www.disposemymeds.org.          This list is accurate as of 4/11/18 12:45 PM.  Always use your most recent med list.                   Brand Name Dispense Instructions for use Diagnosis    albuterol 108 (90 Base) MCG/ACT Inhaler    PROAIR HFA/PROVENTIL HFA/VENTOLIN HFA    18 g    Inhale 2 puffs into the lungs every 6 hours as needed for shortness of breath / dyspnea or wheezing    Chronic obstructive pulmonary disease, unspecified COPD type (H)       amLODIPine 5 MG tablet    NORVASC    60 tablet    Take 2 tablets (10 mg) by mouth daily    Renovascular hypertension       ASPIR-LOW 81 MG EC tablet   Generic drug:  aspirin     120 tablet    TAKE 1 TABLET BY MOUTH EVERY DAY    History of MI (myocardial infarction), Essential hypertension, benign       atorvastatin 40 MG tablet    LIPITOR    90 tablet    Take 1 tablet (40 mg) by mouth daily    Hyperlipidemia LDL goal <100       blood glucose monitoring  lancets     1 Box    Test BS four times daily as directed    Type 2 diabetes mellitus without complication, with long-term current use of insulin (H)       blood glucose monitoring test strip    FREESTYLE LITE    50 strip    TEST BLOOD SUGAR TWO TIMES A DAY    Type 2 diabetes mellitus without complication, with long-term current use of insulin (H)       docusate sodium 100 MG capsule    COLACE    60 capsule    Take 1 capsule (100 mg) by mouth 2 times daily    Constipation, unspecified constipation type       EUCERIN CALMING DAILY MOIST Crea     1 Tube    Externally apply 1 dose * topically daily    Type II or unspecified type diabetes mellitus with neurological manifestations, not stated as uncontrolled(250.60) (H)       ferrous sulfate 325 (65 Fe) MG tablet    IRON    100 tablet    Take 1 tablet (325 mg) by mouth daily (with breakfast)    Iron deficiency anemia, unspecified iron deficiency anemia type       fluticasone 50 MCG/ACT spray    FLONASE    1 Bottle    Spray 1-2 sprays into both nostrils daily    Acute nasopharyngitis       hydrALAZINE 25 MG tablet    APRESOLINE    60 tablet    Take 1 tablet (25 mg) by mouth 2 times daily    Essential hypertension, Chronic kidney disease, stage 4 (severe) (H)       insulin glargine 100 UNIT/ML injection    LANTUS    15 mL    Inject 10 Units Subcutaneous every morning    Controlled type 2 diabetes mellitus with stage 4 chronic kidney disease, with long-term current use of insulin (H)       insulin pen needle 31G X 6 MM     120 each    Use as directed    Type 2 diabetes mellitus without complication (H)       LASIX 40 MG tablet   Generic drug:  furosemide     60 tablet    Take 1 tablet (40 mg) by mouth daily    Hyperkalemia, Edema, unspecified type       levothyroxine 200 MCG tablet    SYNTHROID/LEVOTHROID    30 tablet    TAKE 1 TABLET BY MOUTH EVERY DAY    Other specified hypothyroidism       metoprolol succinate 25 MG 24 hr tablet    TOPROL-XL    90 tablet    Take 1 tablet  (25 mg) by mouth daily    Hypertension associated with diabetes (H)       mometasone-formoterol 100-5 MCG/ACT oral inhaler    DULERA    1 Inhaler    Inhale 2 puffs into the lungs 2 times daily    COPD (chronic obstructive pulmonary disease) (H)       nicotine polacrilex 2 MG lozenge    COMMIT    100 lozenge    Dissolve 1 lozenge orally every 4 hours as directed.    Chronic obstructive pulmonary disease, unspecified COPD type (H)       nitroGLYcerin 0.4 MG sublingual tablet    NITROSTAT    25 tablet    Place 1 tablet (0.4 mg) under the tongue every 5 minutes as needed for chest pain    History of MI (myocardial infarction)       nystatin 187760 UNIT/GM Powd    MYCOSTATIN    30 g    Apply 1 g topically 3 times daily as needed    Groin rash       * order for DME     1 Device    One wheeled walker with seat and brakes and basket    Risk for falls       * order for DME     2 Device    Equipment being ordered: two pairs moderate knee high support hose    Edema, unspecified type       * order for DME     2 each    Equipment being ordered: Compression socks. Strength:15-20 mmHg    Localized edema       oxyCODONE IR 10 MG tablet    ROXICODONE    90 tablet    Take 1 tablet (10 mg) by mouth every 8 hours as needed    Chronic low back pain without sciatica, unspecified back pain laterality       polyethylene glycol powder    MIRALAX    510 g    Take 17 g (1 capful) by mouth daily    Slow transit constipation       SM ALCOHOL PREP 70 % Pads     100 each    Externally apply 1 pad topically 4 times daily    Type 2 diabetes mellitus without complication, with long-term current use of insulin (H)       sodium bicarbonate 325 MG tablet     360 tablet    Take 2 tablets (650 mg) by mouth 2 times daily    Acidosis, CKD (chronic kidney disease) stage 4, GFR 15-29 ml/min (H)       tiotropium 18 MCG capsule    SPIRIVA HANDIHALER    90 capsule    Inhale contents of one capsule daily.    Pulmonary emphysema, unspecified emphysema type (H)        vitamin D 2000 units tablet     60 tablet    Take 2,000 Units by mouth daily    Vitamin D deficiency disease       vitamin D 76098 UNIT capsule    ERGOCALCIFEROL    8 capsule    Take 1 capsule (50,000 Units) by mouth every 7 days for 8 doses    Vitamin D deficiency       * Notice:  This list has 3 medication(s) that are the same as other medications prescribed for you. Read the directions carefully, and ask your doctor or other care provider to review them with you.
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
## 2018-04-11 NOTE — LETTER
April 16, 2018       TO: Kim Anne  [address] [facility] [address]       Dear ,    We are writing to inform you of your test results.    1.  Start taking Ergocalciferol 50,000 units once weekly.  Do this for 8 weeks.  Hold Cholecalciferol until you have completed the 8 weeks, then begin taking Cholecalciferol 2000 units once a day.    2.  Keep up good work of quitting smoking.    3. Blood pressures is in good range and no edema. No changes to blood pressure meds.    4.  Let us know if you start to have nausea, vomiting, itching, confusion or slowed thinking, funny tastes of food, diarrhea, difficulty or decreased urine output, trouble breathing or swelling of hands/legs/face.    Resulted Orders   Renal panel   Result Value Ref Range    Sodium 143 133 - 144 mmol/L    Potassium 4.9 3.4 - 5.3 mmol/L    Chloride 116 (H) 94 - 109 mmol/L    Carbon Dioxide 18 (L) 20 - 32 mmol/L    Anion Gap 10 3 - 14 mmol/L    Glucose 99 70 - 99 mg/dL    Urea Nitrogen 43 (H) 7 - 30 mg/dL    Creatinine 3.84 (H) 0.52 - 1.04 mg/dL    GFR Estimate 12 (L) >60 mL/min/1.7m2      Comment:      Non  GFR Calc    GFR Estimate If Black 14 (L) >60 mL/min/1.7m2      Comment:       GFR Calc    Calcium 8.0 (L) 8.5 - 10.1 mg/dL    Phosphorus 4.5 2.5 - 4.5 mg/dL    Albumin 2.1 (L) 3.4 - 5.0 g/dL   CBC with platelets   Result Value Ref Range    WBC 6.3 4.0 - 11.0 10e9/L    RBC Count 4.32 3.8 - 5.2 10e12/L    Hemoglobin 12.1 11.7 - 15.7 g/dL    Hematocrit 39.5 35.0 - 47.0 %    MCV 91 78 - 100 fl    MCH 28.0 26.5 - 33.0 pg    MCHC 30.6 (L) 31.5 - 36.5 g/dL    RDW 15.4 (H) 10.0 - 15.0 %    Platelet Count 333 150 - 450 10e9/L       Wendy Espinal PA-C

## 2018-04-12 ENCOUNTER — HOSPITAL ENCOUNTER (OUTPATIENT)
Dept: CT IMAGING | Facility: CLINIC | Age: 73
Discharge: HOME | End: 2018-04-12
Attending: NURSE PRACTITIONER | Admitting: NURSE PRACTITIONER
Payer: COMMERCIAL

## 2018-04-12 ENCOUNTER — TELEPHONE (OUTPATIENT)
Dept: FAMILY MEDICINE | Facility: CLINIC | Age: 73
End: 2018-04-12

## 2018-04-12 DIAGNOSIS — G89.4 CHRONIC PAIN SYNDROME: Primary | ICD-10-CM

## 2018-04-12 DIAGNOSIS — R22.9 LOCAL SUPERFICIAL SWELLING, MASS OR LUMP: ICD-10-CM

## 2018-04-12 PROCEDURE — 72192 CT PELVIS W/O DYE: CPT

## 2018-04-13 RX ORDER — POLYETHYLENE GLYCOL 3350 17 G/17G
1 POWDER, FOR SOLUTION ORAL DAILY PRN
Qty: 510 G | Refills: 2 | COMMUNITY
Start: 2018-04-13 | End: 2018-06-25

## 2018-04-16 LAB — PAIN DRUG SCR UR W RPTD MEDS: NORMAL

## 2018-04-18 ENCOUNTER — CARE COORDINATION (OUTPATIENT)
Dept: NEPHROLOGY | Facility: CLINIC | Age: 73
End: 2018-04-18

## 2018-04-26 ENCOUNTER — TELEPHONE (OUTPATIENT)
Dept: FAMILY MEDICINE | Facility: CLINIC | Age: 73
End: 2018-04-26

## 2018-04-26 DIAGNOSIS — M54.50 CHRONIC LOW BACK PAIN WITHOUT SCIATICA, UNSPECIFIED BACK PAIN LATERALITY: ICD-10-CM

## 2018-04-26 DIAGNOSIS — G89.29 CHRONIC LOW BACK PAIN WITHOUT SCIATICA, UNSPECIFIED BACK PAIN LATERALITY: ICD-10-CM

## 2018-04-26 DIAGNOSIS — B85.0 HEAD LICE: Primary | ICD-10-CM

## 2018-04-26 NOTE — TELEPHONE ENCOUNTER
RN Lice Protocol: Ages 4 and older (nurse mostly huddle for age 3 and younger)  Kim Anne is a 72 year old female who is being evaluated for symptoms of head lice.      NURSING ASSESSMENT:  Reports: persistent itchy scalp, visualization of nits or lice and exposure to known infestation  Denies: report from school or   In addition notes: None    Complicating factors:  Reports: NONE that apply to this patient    NURSING PLAN: Routed to provider Yes    EDUCATION: Patient given instructions and education regarding appropriate treatment of lice and removal of nits. Advised a second treatment may be necessary if symptoms do not resolve after 7-10 days.     RECOMMENDED DISPOSITION:  Will forward to PCP  Will comply with recommendation: Yes  Nurse Triage.    Pt asking for Lice shampoo.       Juan Miles RN

## 2018-04-26 NOTE — TELEPHONE ENCOUNTER
Reason for Call:  Lotion     Detailed comments: pt want a Rx send  to her pharmacy for head lice.  [facility] - [facility] tel:  [phone]    Phone Number Patient can be reached at: Home number on file [phone] (home)    Best Time: anytime    Can we leave a detailed message on this number? YES    Call taken on 4/26/2018 at 2:08 PM by Bucky Weller

## 2018-04-26 NOTE — TELEPHONE ENCOUNTER
Shampoo ordered with instructions. Patient may repeat rinse in one week if visualizing lice.   JUNIOR Ragland CNP

## 2018-04-27 DIAGNOSIS — I15.2 HYPERTENSION ASSOCIATED WITH DIABETES (H): ICD-10-CM

## 2018-04-27 DIAGNOSIS — E11.59 HYPERTENSION ASSOCIATED WITH DIABETES (H): ICD-10-CM

## 2018-04-27 RX ORDER — METOPROLOL SUCCINATE 25 MG/1
TABLET, EXTENDED RELEASE ORAL
Qty: 30 TABLET | Refills: 1 | OUTPATIENT
Start: 2018-04-27

## 2018-04-30 RX ORDER — OXYCODONE HYDROCHLORIDE 10 MG/1
10 TABLET ORAL EVERY 8 HOURS PRN
Qty: 90 TABLET | Refills: 0 | Status: SHIPPED | OUTPATIENT
Start: 2018-04-30 | End: 2018-05-30

## 2018-05-14 ENCOUNTER — PATIENT OUTREACH (OUTPATIENT)
Dept: GERIATRIC MEDICINE | Facility: CLINIC | Age: 73
End: 2018-05-14

## 2018-05-14 DIAGNOSIS — N04.9 NEPHROTIC SYNDROME: Primary | ICD-10-CM

## 2018-05-14 NOTE — PROGRESS NOTES
[facility]  Arranged transportation thru [facility] PAR for the below appt:  Appt Date & Time: 5/16 @ 10:30 am  Clinic Name & Address:  [facility] [address] [address]  Transportation Provider: [employer]   time:  9:30-10 am     Notified Kim of  time.    Luci Godinez  CMS Supervisor  [facility]  [phone]

## 2018-05-16 ENCOUNTER — OFFICE VISIT (OUTPATIENT)
Dept: NEPHROLOGY | Facility: CLINIC | Age: 73
End: 2018-05-16
Attending: PHYSICIAN ASSISTANT
Payer: COMMERCIAL

## 2018-05-16 VITALS
HEIGHT: 66 IN | OXYGEN SATURATION: 96 % | HEART RATE: 72 BPM | WEIGHT: 167 LBS | TEMPERATURE: 98.1 F | DIASTOLIC BLOOD PRESSURE: 66 MMHG | BODY MASS INDEX: 26.84 KG/M2 | SYSTOLIC BLOOD PRESSURE: 183 MMHG

## 2018-05-16 DIAGNOSIS — E87.5 HYPERKALEMIA: ICD-10-CM

## 2018-05-16 DIAGNOSIS — R60.9 EDEMA, UNSPECIFIED TYPE: ICD-10-CM

## 2018-05-16 DIAGNOSIS — L98.9 SKIN LESION OF HAND: ICD-10-CM

## 2018-05-16 DIAGNOSIS — I15.0 RENOVASCULAR HYPERTENSION: Primary | ICD-10-CM

## 2018-05-16 DIAGNOSIS — N04.9 NEPHROTIC SYNDROME: ICD-10-CM

## 2018-05-16 DIAGNOSIS — N18.5 CKD (CHRONIC KIDNEY DISEASE) STAGE 5, GFR LESS THAN 15 ML/MIN (H): ICD-10-CM

## 2018-05-16 LAB
ALBUMIN SERPL-MCNC: 2.3 G/DL (ref 3.4–5)
ALBUMIN UR-MCNC: >499 MG/DL
ANION GAP SERPL CALCULATED.3IONS-SCNC: 8 MMOL/L (ref 3–14)
APPEARANCE UR: CLEAR
BILIRUB UR QL STRIP: NEGATIVE
BUN SERPL-MCNC: 37 MG/DL (ref 7–30)
CALCIUM SERPL-MCNC: 8.2 MG/DL (ref 8.5–10.1)
CHLORIDE SERPL-SCNC: 115 MMOL/L (ref 94–109)
CO2 SERPL-SCNC: 19 MMOL/L (ref 20–32)
COLOR UR AUTO: ABNORMAL
CREAT SERPL-MCNC: 3.86 MG/DL (ref 0.52–1.04)
CREAT UR-MCNC: 37 MG/DL
ERYTHROCYTE [DISTWIDTH] IN BLOOD BY AUTOMATED COUNT: 15.9 % (ref 10–15)
GFR SERPL CREATININE-BSD FRML MDRD: 11 ML/MIN/1.7M2
GLUCOSE SERPL-MCNC: 103 MG/DL (ref 70–99)
GLUCOSE UR STRIP-MCNC: 150 MG/DL
HCT VFR BLD AUTO: 40.4 % (ref 35–47)
HGB BLD-MCNC: 12.3 G/DL (ref 11.7–15.7)
HGB UR QL STRIP: NEGATIVE
HYALINE CASTS #/AREA URNS LPF: 1 /LPF (ref 0–2)
KETONES UR STRIP-MCNC: NEGATIVE MG/DL
LEUKOCYTE ESTERASE UR QL STRIP: NEGATIVE
MCH RBC QN AUTO: 28.5 PG (ref 26.5–33)
MCHC RBC AUTO-ENTMCNC: 30.4 G/DL (ref 31.5–36.5)
MCV RBC AUTO: 94 FL (ref 78–100)
NITRATE UR QL: NEGATIVE
PH UR STRIP: 7 PH (ref 5–7)
PHOSPHATE SERPL-MCNC: 4.5 MG/DL (ref 2.5–4.5)
PLATELET # BLD AUTO: 300 10E9/L (ref 150–450)
POTASSIUM SERPL-SCNC: 5.3 MMOL/L (ref 3.4–5.3)
PROT UR-MCNC: 6.04 G/L
PROT/CREAT 24H UR: 16.14 G/G CR (ref 0–0.2)
RBC # BLD AUTO: 4.32 10E12/L (ref 3.8–5.2)
RBC #/AREA URNS AUTO: 1 /HPF (ref 0–2)
SODIUM SERPL-SCNC: 142 MMOL/L (ref 133–144)
SOURCE: ABNORMAL
SP GR UR STRIP: 1.01 (ref 1–1.03)
SQUAMOUS #/AREA URNS AUTO: 1 /HPF (ref 0–1)
UROBILINOGEN UR STRIP-MCNC: 0 MG/DL (ref 0–2)
WBC # BLD AUTO: 8.4 10E9/L (ref 4–11)
WBC #/AREA URNS AUTO: 1 /HPF (ref 0–5)

## 2018-05-16 PROCEDURE — 84156 ASSAY OF PROTEIN URINE: CPT | Performed by: PHYSICIAN ASSISTANT

## 2018-05-16 PROCEDURE — G0463 HOSPITAL OUTPT CLINIC VISIT: HCPCS | Mod: ZF

## 2018-05-16 PROCEDURE — 81001 URINALYSIS AUTO W/SCOPE: CPT | Performed by: PHYSICIAN ASSISTANT

## 2018-05-16 PROCEDURE — 80069 RENAL FUNCTION PANEL: CPT | Performed by: PHYSICIAN ASSISTANT

## 2018-05-16 PROCEDURE — 85027 COMPLETE CBC AUTOMATED: CPT | Performed by: PHYSICIAN ASSISTANT

## 2018-05-16 PROCEDURE — 36415 COLL VENOUS BLD VENIPUNCTURE: CPT | Performed by: PHYSICIAN ASSISTANT

## 2018-05-16 RX ORDER — FUROSEMIDE 40 MG
60 TABLET ORAL DAILY
Qty: 90 TABLET | Refills: 3 | Status: ON HOLD | OUTPATIENT
Start: 2018-05-16 | End: 2019-05-05

## 2018-05-16 ASSESSMENT — PAIN SCALES - GENERAL: PAINLEVEL: MODERATE PAIN (4)
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
## 2018-05-20 PROBLEM — L98.9 SKIN LESION OF HAND: Status: ACTIVE | Noted: 2018-05-20

## 2018-05-21 ENCOUNTER — TELEPHONE (OUTPATIENT)
Dept: FAMILY MEDICINE | Facility: CLINIC | Age: 73
End: 2018-05-21

## 2018-05-21 DIAGNOSIS — E11.9 TYPE 2 DIABETES MELLITUS WITHOUT COMPLICATION, WITH LONG-TERM CURRENT USE OF INSULIN (H): ICD-10-CM

## 2018-05-21 DIAGNOSIS — E11.22 CONTROLLED TYPE 2 DIABETES MELLITUS WITH STAGE 4 CHRONIC KIDNEY DISEASE, WITH LONG-TERM CURRENT USE OF INSULIN (H): ICD-10-CM

## 2018-05-21 DIAGNOSIS — Z79.4 CONTROLLED TYPE 2 DIABETES MELLITUS WITH STAGE 4 CHRONIC KIDNEY DISEASE, WITH LONG-TERM CURRENT USE OF INSULIN (H): ICD-10-CM

## 2018-05-21 DIAGNOSIS — N18.4 CONTROLLED TYPE 2 DIABETES MELLITUS WITH STAGE 4 CHRONIC KIDNEY DISEASE, WITH LONG-TERM CURRENT USE OF INSULIN (H): ICD-10-CM

## 2018-05-21 DIAGNOSIS — Z79.4 TYPE 2 DIABETES MELLITUS WITHOUT COMPLICATION, WITH LONG-TERM CURRENT USE OF INSULIN (H): ICD-10-CM

## 2018-05-21 RX ORDER — LANCETS 28 GAUGE
EACH MISCELLANEOUS
Qty: 100 EACH | Refills: 1 | Status: SHIPPED | OUTPATIENT
Start: 2018-05-21

## 2018-05-21 NOTE — TELEPHONE ENCOUNTER
Called patient left message advising supplies refilled please contact pharmacy    Signed Prescriptions:                        Disp   Refills    blood glucose monitoring (FREESTYLE LITE) *50 str*12       Sig: TEST BLOOD SUGAR TWO TIMES A DAY  Authorizing Provider: FLORENTIN STEWART    blood glucose monitoring (FREESTYLE) nanci*100 ea*1        Sig: Test BS two times daily as directed  Authorizing Provider: FLORENTIN STEWART    insulin glargine (LANTUS SOLOSTAR) 100 UNI*15 mL  3        Sig: Inject 10 Units Subcutaneous every morning  Authorizing Provider: FLORENTIN STEWART    insulin pen needle 31G X 6 MM              120 ea*3        Sig: Use as directed  Authorizing Provider: FLORENTIN STEWART    blood glucose monitoring (NO BRAND SPECIFI*1 kit  0        Sig: Use to test blood sugar 2 times daily or as directed.           Preferred blood glucose meter OR supplies to           accompany: Blood Glucose Monitor Brands: per           insurance.  Authorizing Provider: FLORENTIN STEWART      Closing encounter - no further actions needed at this time    Jv Newberry RN

## 2018-05-21 NOTE — TELEPHONE ENCOUNTER
Please call patient and let her know that her blood sugar supplies have been refilled as requested. Patient reported them lost at her [facility] office visit.   JUNIOR Ragland CNP

## 2018-05-30 DIAGNOSIS — G89.29 CHRONIC LOW BACK PAIN WITHOUT SCIATICA, UNSPECIFIED BACK PAIN LATERALITY: ICD-10-CM

## 2018-05-30 DIAGNOSIS — M54.50 CHRONIC LOW BACK PAIN WITHOUT SCIATICA, UNSPECIFIED BACK PAIN LATERALITY: ICD-10-CM

## 2018-05-30 NOTE — TELEPHONE ENCOUNTER
Last Written Prescription Date:  4/30/18  Last Fill Quantity: 90,  # refills: 0   Last office visit: 4/10/2018 with prescribing provider:     Future Office Visit:      Requested Prescriptions   Pending Prescriptions Disp Refills     oxyCODONE IR (ROXICODONE) 10 MG tablet 90 tablet 0     Sig: Take 1 tablet (10 mg) by mouth every 8 hours as needed    There is no refill protocol information for this order

## 2018-05-30 NOTE — TELEPHONE ENCOUNTER
oxyCODONE IR (ROXICODONE) 10 MG tablet  Controlled Substance Refill Request    Last refill: 5/2/18, , 90 for 30 days     Last clinic visit: 5/8/18    Next appt: none    Controlled substance agreement on file: Yes:  Date 4/18/18.    Documentation in problem list reviewed:  Yes    Processing:  Patient will  in clinic    [place] reviewed:  reviewed- no concerns  [place]:  [email or web address]      Juan Miles RN

## 2018-05-31 RX ORDER — OXYCODONE HYDROCHLORIDE 10 MG/1
10 TABLET ORAL EVERY 8 HOURS PRN
Qty: 90 TABLET | Refills: 0 | Status: SHIPPED | OUTPATIENT
Start: 2018-06-01 | End: 2018-06-25

## 2018-06-13 PROBLEM — Z53.9 ERRONEOUS ENCOUNTER--DISREGARD: Status: ACTIVE | Noted: 2018-06-13

## 2018-06-25 ENCOUNTER — PATIENT OUTREACH (OUTPATIENT)
Dept: GERIATRIC MEDICINE | Facility: CLINIC | Age: 73
End: 2018-06-25

## 2018-06-25 ENCOUNTER — OFFICE VISIT (OUTPATIENT)
Dept: FAMILY MEDICINE | Facility: CLINIC | Age: 73
End: 2018-06-25
Payer: COMMERCIAL

## 2018-06-25 VITALS
TEMPERATURE: 98.6 F | WEIGHT: 168 LBS | SYSTOLIC BLOOD PRESSURE: 148 MMHG | OXYGEN SATURATION: 98 % | DIASTOLIC BLOOD PRESSURE: 62 MMHG | BODY MASS INDEX: 27.12 KG/M2 | RESPIRATION RATE: 16 BRPM | HEART RATE: 62 BPM

## 2018-06-25 DIAGNOSIS — K59.01 SLOW TRANSIT CONSTIPATION: ICD-10-CM

## 2018-06-25 DIAGNOSIS — K59.00 CONSTIPATION, UNSPECIFIED CONSTIPATION TYPE: ICD-10-CM

## 2018-06-25 DIAGNOSIS — G89.29 CHRONIC LOW BACK PAIN WITHOUT SCIATICA, UNSPECIFIED BACK PAIN LATERALITY: ICD-10-CM

## 2018-06-25 DIAGNOSIS — R60.9 EDEMA, UNSPECIFIED TYPE: ICD-10-CM

## 2018-06-25 DIAGNOSIS — M54.50 CHRONIC LOW BACK PAIN WITHOUT SCIATICA, UNSPECIFIED BACK PAIN LATERALITY: ICD-10-CM

## 2018-06-25 DIAGNOSIS — N18.4 CKD (CHRONIC KIDNEY DISEASE) STAGE 4, GFR 15-29 ML/MIN (H): Primary | ICD-10-CM

## 2018-06-25 DIAGNOSIS — E11.22 TYPE 2 DIABETES MELLITUS WITH STAGE 5 CHRONIC KIDNEY DISEASE NOT ON CHRONIC DIALYSIS, WITHOUT LONG-TERM CURRENT USE OF INSULIN (H): Primary | ICD-10-CM

## 2018-06-25 DIAGNOSIS — N18.5 TYPE 2 DIABETES MELLITUS WITH STAGE 5 CHRONIC KIDNEY DISEASE NOT ON CHRONIC DIALYSIS, WITHOUT LONG-TERM CURRENT USE OF INSULIN (H): Primary | ICD-10-CM

## 2018-06-25 PROCEDURE — 99214 OFFICE O/P EST MOD 30 MIN: CPT | Performed by: NURSE PRACTITIONER

## 2018-06-25 RX ORDER — DOCUSATE SODIUM 100 MG/1
100 CAPSULE, LIQUID FILLED ORAL 2 TIMES DAILY
Qty: 60 CAPSULE | Refills: 4 | Status: SHIPPED | OUTPATIENT
Start: 2018-06-25 | End: 2019-01-04

## 2018-06-25 RX ORDER — POLYETHYLENE GLYCOL 3350 17 G/17G
1 POWDER, FOR SOLUTION ORAL DAILY PRN
Qty: 510 G | Refills: 2 | Status: ON HOLD | OUTPATIENT
Start: 2018-06-25 | End: 2019-06-21

## 2018-06-25 RX ORDER — OXYCODONE HYDROCHLORIDE 10 MG/1
10 TABLET ORAL EVERY 8 HOURS PRN
Qty: 90 TABLET | Refills: 0 | Status: SHIPPED | OUTPATIENT
Start: 2018-07-02 | End: 2018-07-30

## 2018-06-25 ASSESSMENT — ANXIETY QUESTIONNAIRES
3. WORRYING TOO MUCH ABOUT DIFFERENT THINGS: NOT AT ALL
IF YOU CHECKED OFF ANY PROBLEMS ON THIS QUESTIONNAIRE, HOW DIFFICULT HAVE THESE PROBLEMS MADE IT FOR YOU TO DO YOUR WORK, TAKE CARE OF THINGS AT HOME, OR GET ALONG WITH OTHER PEOPLE: NOT DIFFICULT AT ALL
2. NOT BEING ABLE TO STOP OR CONTROL WORRYING: NOT AT ALL
1. FEELING NERVOUS, ANXIOUS, OR ON EDGE: NOT AT ALL
7. FEELING AFRAID AS IF SOMETHING AWFUL MIGHT HAPPEN: NOT AT ALL
6. BECOMING EASILY ANNOYED OR IRRITABLE: NOT AT ALL
5. BEING SO RESTLESS THAT IT IS HARD TO SIT STILL: NOT AT ALL
GAD7 TOTAL SCORE: 0

## 2018-06-25 ASSESSMENT — PATIENT HEALTH QUESTIONNAIRE - PHQ9: 5. POOR APPETITE OR OVEREATING: NOT AT ALL

## 2018-06-25 NOTE — MR AVS SNAPSHOT
After Visit Summary   6/25/2018    Kim Anne    MRN: [id number]           Patient Information     Date Of Birth          1945        Visit Information        Provider Department      6/25/2018 11:30 AM Ailyn Nieves APRN CNP [facility]        Today's Diagnoses     Screening for diabetic retinopathy    -  1    Constipation, unspecified constipation type        Slow transit constipation        Type 2 diabetes mellitus with stage 5 chronic kidney disease not on chronic dialysis, without long-term current use of insulin (H)        Chronic low back pain without sciatica, unspecified back pain laterality        Edema, unspecified type          Care Instructions    -Refills done.     -Call clinic with any questions or concerns.           Follow-ups after your visit        Your next 10 appointments already scheduled     Jun 27, 2018 12:30 PM CDT   Lab with  LAB   [facility] ([facility])    [address] [place] [id number] [phone]            Jun 27, 2018  1:30 PM CDT   (Arrive by 1:00 PM)   Return Visit with Mita Ellsworth NP   [facility] ([facility])    [address] [place] [address]  [address] [place] [address] [id number] [phone]            Aug 03, 2018  8:45 AM CDT   Lab with  LAB   [facility] ([facility])    [address] [place] [id number] [phone]            Aug 03, 2018  9:40 AM CDT   (Arrive by 9:10 AM)   Return Visit with Bhargav Lopes MD   [facility] ([facility])    [address] [place] [address]  [address] [place] [id number] [phone]              Who to contact     If you have questions or need follow up information about today's clinic visit or your schedule please contact [facility] [place] [facility] directly at [phone].  Normal or  "non-critical lab and imaging results will be communicated to you by MyChart, letter or phone within 4 business days after the clinic has received the results. If you do not hear from us within 7 days, please contact the clinic through [employer] or phone. If you have a critical or abnormal lab result, we will notify you by phone as soon as possible.  Submit refill requests through [employer] or call your pharmacy and they will forward the refill request to us. Please allow 3 business days for your refill to be completed.          Additional Information About Your Visit        [employer] Information     [employer] lets you send messages to your doctor, view your test results, renew your prescriptions, schedule appointments and more. To sign up, go to [email or web address] . Click on \"Log in\" on the left side of the screen, which will take you to the Welcome page. Then click on \"Sign up Now\" on the right side of the page.     You will be asked to enter the access code listed below, as well as some personal information. Please follow the directions to create your username and password.     Your access code is: [email or web address]  Expires: 2018  6:30 AM     Your access code will  in 90 days. If you need help or a new code, please call your [facility] clinic or [phone].        Care EveryWhere ID     This is your Care EveryWhere ID. This could be used by other organizations to access your [facility] medical records  [id number]        Your Vitals Were     Pulse Temperature Respirations Pulse Oximetry BMI (Body Mass Index)       62 98.6  F (37  C) (Oral) 16 98% 27.12 kg/m2        Blood Pressure from Last 3 Encounters:   18 148/62   18 183/66   18 125/63    Weight from Last 3 Encounters:   18 168 lb (76.2 kg)   18 167 lb (75.8 kg)   18 166 lb (75.3 kg)              We Performed the Following     Albumin Random Urine Quantitative with Creat Ratio     HEMOGLOBIN A1C          Today's Medication " Changes          These changes are accurate as of 6/25/18 11:56 AM.  If you have any questions, ask your nurse or doctor.               Start taking these medicines.        Dose/Directions    order for DME   Used for:  Type 2 diabetes mellitus with stage 5 chronic kidney disease not on chronic dialysis, without long-term current use of insulin (H)   Started by:  Ailyn Nieves APRN CNP        Equipment being ordered: Diabetic Shoes   Quantity:  1 each   Refills:  0         These medicines have changed or have updated prescriptions.        Dose/Directions    oxyCODONE IR 10 MG tablet   Commonly known as:  ROXICODONE   This may have changed:  These instructions start on 7/2/2018. If you are unsure what to do until then, ask your doctor or other care provider.   Used for:  Chronic low back pain without sciatica, unspecified back pain laterality   Changed by:  Ailyn Nieves APRN CNP        Dose:  10 mg   Start taking on:  7/2/2018   Take 1 tablet (10 mg) by mouth every 8 hours as needed   Quantity:  90 tablet   Refills:  0            Where to get your medicines      These medications were sent to [facility] [place], [place] - [address] [facility]  [address] [facility] [address]     Phone:  [phone]     docusate sodium 100 MG capsule    polyethylene glycol powder         Some of these will need a paper prescription and others can be bought over the counter.  Ask your nurse if you have questions.     Bring a paper prescription for each of these medications     order for DME    order for DME    oxyCODONE IR 10 MG tablet               Information about OPIOIDS     PRESCRIPTION OPIOIDS: WHAT YOU NEED TO KNOW   We gave you an opioid (narcotic) pain medicine. It is important to manage your pain, but opioids are not always the best choice. You should first try all the other options your care team gave you. Take this medicine for as short a time (and as few doses) as possible.     These  medicines have risks:    DO NOT drive when on new or higher doses of pain medicine. These medicines can affect your alertness and reaction times, and you could be arrested for driving under the influence (DUI). If you need to use opioids long-term, talk to your care team about driving.    DO NOT operate heave machinery    DO NOT do any other dangerous activities while taking these medicines.     DO NOT drink any alcohol while taking these medicines.      If the opioid prescribed includes acetaminophen, DO NOT take with any other medicines that contain acetaminophen. Read all labels carefully. Look for the word  acetaminophen  or  Tylenol.  Ask your pharmacist if you have questions or are unsure.    You can get addicted to pain medicines, especially if you have a history of addiction (chemical, alcohol or substance dependence). Talk to your care team about ways to reduce this risk.    Store your pills in a secure place, locked if possible. We will not replace any lost or stolen medicine. If you don t finish your medicine, please throw away (dispose) as directed by your pharmacist. The [place] [employer] has more information about safe disposal: https://www.pca.[facility].[place].us/living-green/managing-unwanted-medications.     All opioids tend to cause constipation. Drink plenty of water and eat foods that have a lot of fiber, such as fruits, vegetables, prune juice, apple juice and high-fiber cereal. Take a laxative (Miralax, milk of magnesia, Colace, Senna) if you don t move your bowels at least every other day.          Primary Care Provider Office Phone # Fax #    JUNIOR Bishop [phone]       [address] [facility] [address]  [facility] [address]        Equal Access to Services     [facility]: Hadii alysha graff Soluiz, waaxda luqadaha, qaybta kaalkassandra ramirez. [facility] [phone].    ATENCIÓN: Si saela español, tiene a bailey disposición  servicios gratuitos de asistencia lingüística. Khadijah duran [phone].    We comply with applicable federal civil rights laws and [place] laws. We do not discriminate on the basis of race, color, national origin, age, disability, sex, sexual orientation, or gender identity.            Thank you!     Thank you for choosing [facility]  for your care. Our goal is always to provide you with excellent care. Hearing back from our patients is one way we can continue to improve our services. Please take a few minutes to complete the written survey that you may receive in the mail after your visit with us. Thank you!             Your Updated Medication List - Protect others around you: Learn how to safely use, store and throw away your medicines at www.disposemymeds.org.          This list is accurate as of 6/25/18 11:56 AM.  Always use your most recent med list.                   Brand Name Dispense Instructions for use Diagnosis    albuterol 108 (90 Base) MCG/ACT Inhaler    PROAIR HFA/PROVENTIL HFA/VENTOLIN HFA    18 g    Inhale 2 puffs into the lungs every 6 hours as needed for shortness of breath / dyspnea or wheezing    Chronic obstructive pulmonary disease, unspecified COPD type (H)       amLODIPine 5 MG tablet    NORVASC    60 tablet    Take 2 tablets (10 mg) by mouth daily    Renovascular hypertension       ASPIR-LOW 81 MG EC tablet   Generic drug:  aspirin     120 tablet    TAKE 1 TABLET BY MOUTH EVERY DAY    History of MI (myocardial infarction), Essential hypertension, benign       atorvastatin 40 MG tablet    LIPITOR    90 tablet    Take 1 tablet (40 mg) by mouth daily    Hyperlipidemia LDL goal <100       blood glucose monitoring lancets     100 each    Test BS two times daily as directed    Type 2 diabetes mellitus without complication, with long-term current use of insulin (H)       blood glucose monitoring meter device kit    no brand specified    1 kit    Use to test blood  sugar 2 times daily or as directed. Preferred blood glucose meter OR supplies to accompany: Blood Glucose Monitor Brands: per insurance.    Type 2 diabetes mellitus without complication, with long-term current use of insulin (H)       blood glucose monitoring test strip    FREESTYLE LITE    50 strip    TEST BLOOD SUGAR TWO TIMES A DAY    Type 2 diabetes mellitus without complication, with long-term current use of insulin (H)       Blood Pressure Monitor Kit     1 kit    Automatic Blood Pressure Monitor    Renovascular hypertension       docusate sodium 100 MG capsule    COLACE    60 capsule    Take 1 capsule (100 mg) by mouth 2 times daily    Constipation, unspecified constipation type       EUCERIN CALMING DAILY MOIST Crea     1 Tube    Externally apply 1 dose * topically daily    Type II or unspecified type diabetes mellitus with neurological manifestations, not stated as uncontrolled(250.60) (H)       ferrous sulfate 325 (65 Fe) MG tablet    IRON    100 tablet    Take 1 tablet (325 mg) by mouth daily (with breakfast)    Iron deficiency anemia, unspecified iron deficiency anemia type       fluticasone 50 MCG/ACT spray    FLONASE    1 Bottle    Spray 1-2 sprays into both nostrils daily    Acute nasopharyngitis       furosemide 40 MG tablet    LASIX    90 tablet    Take 1.5 tablets (60 mg) by mouth daily    Hyperkalemia, Edema, unspecified type       hydrALAZINE 25 MG tablet    APRESOLINE    60 tablet    Take 1 tablet (25 mg) by mouth 2 times daily    Essential hypertension, Chronic kidney disease, stage 4 (severe) (H)       insulin glargine 100 UNIT/ML injection    LANTUS    15 mL    Inject 10 Units Subcutaneous every morning    Controlled type 2 diabetes mellitus with stage 4 chronic kidney disease, with long-term current use of insulin (H)       insulin pen needle 31G X 6 MM     120 each    Use as directed    Type 2 diabetes mellitus without complication, with long-term current use of insulin (H)        levothyroxine 200 MCG tablet    SYNTHROID/LEVOTHROID    30 tablet    TAKE 1 TABLET BY MOUTH EVERY DAY    Other specified hypothyroidism       metoprolol succinate 25 MG 24 hr tablet    TOPROL-XL    90 tablet    Take 1 tablet (25 mg) by mouth daily    Hypertension associated with diabetes (H)       mometasone-formoterol 100-5 MCG/ACT oral inhaler    DULERA    1 Inhaler    Inhale 2 puffs into the lungs 2 times daily    COPD (chronic obstructive pulmonary disease) (H)       nicotine polacrilex 2 MG lozenge    COMMIT    100 lozenge    Dissolve 1 lozenge orally every 4 hours as directed.    Chronic obstructive pulmonary disease, unspecified COPD type (H)       nitroGLYcerin 0.4 MG sublingual tablet    NITROSTAT    25 tablet    Place 1 tablet (0.4 mg) under the tongue every 5 minutes as needed for chest pain    History of MI (myocardial infarction)       nystatin 658474 UNIT/GM Powd    MYCOSTATIN    30 g    Apply 1 g topically 3 times daily as needed    Groin rash       * order for DME     1 Device    One wheeled walker with seat and brakes and basket    Risk for falls       * order for DME     2 each    Equipment being ordered: Compression socks. Strength:15-20 mmHg    Localized edema       order for DME     1 each    Equipment being ordered: Diabetic Shoes    Type 2 diabetes mellitus with stage 5 chronic kidney disease not on chronic dialysis, without long-term current use of insulin (H)       order for DME     2 Device    Equipment being ordered: two pairs moderate knee high support hose    Edema, unspecified type       oxyCODONE IR 10 MG tablet   Start taking on:  7/2/2018    ROXICODONE    90 tablet    Take 1 tablet (10 mg) by mouth every 8 hours as needed    Chronic low back pain without sciatica, unspecified back pain laterality       permethrin 1 % Liqd     120 mL    Wash hair with conditioner-free shampoo; rinse with water and towel dry. Apply a sufficient amount of lotion or cream rinse to saturate the hair and  scalp (especially behind the ears and nape of neck). Leave on hair for 10 minutes (but no longer), then rinse off with warm water; remove remaining nits with nit comb.   May repeat 7 days after first treatment if lice or nits are still present    Head lice       polyethylene glycol powder    MIRALAX    510 g    Take 17 g (1 capful) by mouth daily as needed for constipation    Slow transit constipation       SM ALCOHOL PREP 70 % Pads     100 each    Externally apply 1 pad topically 4 times daily    Type 2 diabetes mellitus without complication, with long-term current use of insulin (H)       sodium bicarbonate 325 MG tablet     360 tablet    Take 2 tablets (650 mg) by mouth 2 times daily    Acidosis, CKD (chronic kidney disease) stage 4, GFR 15-29 ml/min (H)       tiotropium 18 MCG capsule    SPIRIVA HANDIHALER    90 capsule    Inhale contents of one capsule daily.    Pulmonary emphysema, unspecified emphysema type (H)       vitamin D 2000 units tablet     60 tablet    Take 2,000 Units by mouth daily    Vitamin D deficiency disease       * Notice:  This list has 2 medication(s) that are the same as other medications prescribed for you. Read the directions carefully, and ask your doctor or other care provider to review them with you.

## 2018-06-25 NOTE — PROGRESS NOTES
SUBJECTIVE:                                                    Kim Anne is a 72 year old  female who presents to clinic today for the following health issues:    Patient reports that she has been out of town visiting with her family for the past month and planning on going back in July. Reports that her main concern is trying to keep her scheduled appointments.     Diabetes Follow-up  Patient reports that she does not go too low or too high on her blood sugars; reports numbers in the 90's. Checking every other day. Reports that she has been feeling good in general.    Patient is checking blood sugars: about every other day.     Diabetic concerns: None     Symptoms of hypoglycemia (low blood sugar): none     Paresthesias (numbness or burning in feet) or sores: Yes; Occasionally in the evening edema.      Date of last diabetic eye exam: 5/2017    BP Readings from Last 2 Encounters:   05/16/18 183/66   04/11/18 125/63     Hemoglobin A1C (%)   Date Value   01/16/2018 6.0   10/25/2017 6.6 (H)     LDL Cholesterol Calculated (mg/dL)   Date Value   10/25/2017 104 (H)   09/07/2017 81     Hypertension Follow-up  Denies any headache, chest pain, heart palpitations or shortness of breath. Reports increase edema to left LL in comparison to righ LL. Reports elevation of legs in the evening and also trying to use her compression socks often.     Outpatient blood pressures are not being checked.    Low Salt Diet: not monitoring salt    Chronic Pain Follow-Up     Type / Location of Pain: Low back; worse when not taking her pain medications.   Analgesia/pain control:       Recent changes:  worse      Overall control: Inadequate pain control - varies  Activity level/function:      Daily activities:  Able to do moderate activities    Work:  not applicable  Adverse effects:  No  Adherance    Taking medication as directed?  Yes    Participating in other treatments: yes  Risk Factors:    Sleep:  Fair  -  sometimes wakes up to use bathroom    Mood/anxiety:  controlled    Recent family or social stressors:  none noted    Other aggravating factors: none  PHQ-9 SCORE 2/14/2017 2/12/2018 3/12/2018   Total Score - - -   Total Score - - -   Total Score 1 0 0     JUSTINE-7 SCORE 12/2/2016 12/12/2016 3/12/2018   Total Score - - -   Total Score 0 0 0   Total Score - - -     Encounter-Level CSA - 04/10/2018:          Controlled Substance Agreement - Scan on 4/18/2018  3:13 PM : CONTROLLED SUBSTANCE AGREEMENT (below)            Encounter-Level CSA - 04/10/2018:          Controlled Substance Agreement - Scan on 2/16/2017  2:30 PM : CONTROLLED SUBSTANCE AGREEMENT (below)            Encounter-Level CSA - 04/10/2018:          Controlled Substance Agreement - Scan on 1/16/2017  6:55 PM : CONTROLLED SUBSTANCE AGREEMENT (below)            Encounter-Level CSA - 04/10/2018:          Controlled Substance Agreement - Scan on 3/8/2016  4:25 AM : CONTROLLED SUBSTANCE AGREEMENT 03/07/16 (below)            Encounter-Level CSA - 04/10/2018:          Controlled Substance Agreement - Scan on 3/5/2015  9:09 AM : Controlled Substance Agreement 03/04/15 (below)                  Problems taking medications regularly: No    Medication side effects: none    Diet: low fat/cholesterol    Social History   Substance Use Topics     Smoking status: Current Every Day Smoker     Packs/day: 0.80     Years: 40.00     Types: Cigarettes     Last attempt to quit: 10/31/2016     Smokeless tobacco: Never Used     Alcohol use No        Problem list and histories reviewed & adjusted, as indicated.  Additional history: as documented    Patient Active Problem List   Diagnosis     Hyperlipidemia LDL goal <100     ARAUZ (dyspnea on exertion)     COPD (chronic obstructive pulmonary disease) (H)     Edema     Fatigue     History of MI (myocardial infarction)     Snores     Knee pain, left     Bunion of great toe of left foot     Dystrophic nail     Arthritis     Peripheral  vascular disease (H)     Chronic low back pain     Health Care Home     CKD (chronic kidney disease) stage 4, GFR 15-29 ml/min (H)     Abnormal gait     Advance Care Planning     Vitamin D deficiency     Constipation     Hypertension goal BP (blood pressure) < 130/80     Bradycardia     Callus of foot     Other chronic pain     Cataracts, bilateral     Hyperopic astigmatism of both eyes     Presbyopia     Asymptomatic bunion, right     Acquired hypothyroidism     Type 2 diabetes mellitus with diabetic chronic kidney disease (H)     Hypertension associated with diabetes (H)     Hyperlipidemia associated with type 2 diabetes mellitus (H)     Obesity (BMI 30-39.9)     History of sick sinus syndrome     Nephrotic syndrome     Pneumonia     Grief     Cigarette nicotine dependence without complication     HCOM with LVOT     Hyperkalemia     Type 2 diabetes, HbA1C goal < 8% (H)     Smoker     Single kidney     Hypothyroid     Hypertension goal BP (blood pressure) < 140/90     Hyperlipidemia     Diabetic nephropathy (H)     Hypoalbuminemia     Skin lesion of hand     ERRONEOUS ENCOUNTER--DISREGARD     Past Surgical History:   Procedure Laterality Date     APPENDECTOMY       BLEPHAROPLASTY BILATERAL  9/18/2013    Procedure: BLEPHAROPLASTY BILATERAL;  BILATERAL UPPER EYELID BLEPHAROPLASTY ;  Surgeon: Olayinka Lyon MD;  Location: [facility]     CATARACT IOL, RT/LT Bilateral 2016     CHOLECYSTECTOMY       COLONOSCOPY  7/15/2011    polyps repeat in 5 years     elected term pregnancy       HYSTEROSCOPIC PLACEMENT CONTRACEPTIVE DEVICE       KIDNEY SURGERY  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 No family hx of      Hypertension No family hx of      Cerebrovascular Disease No family hx of      Breast Cancer No family hx of      Cancer - colorectal No family hx of      Prostate Cancer No family hx of      Allergies No family hx of      Alzheimer Disease No family hx of      Anesthesia Reaction No family hx of      Arthritis No family hx of      Blood Disease No family hx of      Cancer No family hx of      Cardiovascular No family hx of      Circulatory No family hx of      Congenital Anomalies No family hx of      Connective Tissue Disorder No family hx of      Depression No family hx of      Eye Disorder No family hx of      Genetic Disorder No family hx of      GASTROINTESTINAL DISEASE No family hx of      Genitourinary Problems No family hx of      Gynecology No family hx of      HEART DISEASE No family hx of      Lipids No family hx of      Musculoskeletal Disorder No family hx of      Neurologic Disorder No family hx of      Obesity No family hx of      Osteoperosis No family hx of      Psychotic Disorder No family hx of      Respiratory No family hx of      Thyroid Disease No family hx of      Glaucoma No family hx of      Macular Degeneration No family hx of            Current Outpatient Prescriptions   Medication Sig Dispense Refill     albuterol (PROAIR HFA/PROVENTIL HFA/VENTOLIN HFA) 108 (90 BASE) MCG/ACT Inhaler Inhale 2 puffs into the lungs every 6 hours as needed for shortness of breath / dyspnea or wheezing 18 g 3     Alcohol Swabs (SM ALCOHOL PREP) 70 % PADS Externally apply 1 pad topically 4 times daily 100 each 11     amLODIPine (NORVASC) 5 MG tablet Take 2 tablets (10 mg) by mouth daily 60 tablet 3     ASPIR-LOW 81 MG EC tablet TAKE 1 TABLET BY MOUTH EVERY  tablet 2     atorvastatin  (LIPITOR) 40 MG tablet Take 1 tablet (40 mg) by mouth daily 90 tablet 1     blood glucose monitoring (FREESTYLE LITE) test strip TEST BLOOD SUGAR TWO TIMES A DAY 50 strip 12     blood glucose monitoring (FREESTYLE) lancets Test BS two times daily as directed 100 each 1     blood glucose monitoring (NO BRAND SPECIFIED) meter device kit Use to test blood sugar 2 times daily or as directed. Preferred blood glucose meter OR supplies to accompany: Blood Glucose Monitor Brands: per insurance. 1 kit 0     Blood Pressure Monitor KIT Automatic Blood Pressure Monitor 1 kit 0     Cholecalciferol (VITAMIN D) 2000 units tablet Take 2,000 Units by mouth daily 60 tablet 2     docusate sodium (COLACE) 100 MG capsule Take 1 capsule (100 mg) by mouth 2 times daily 60 capsule 4     Emollient (EUCERIN CALMING DAILY MOIST) CREA Externally apply 1 dose * topically daily 1 Tube 12     ferrous sulfate (IRON) 325 (65 FE) MG tablet Take 1 tablet (325 mg) by mouth daily (with breakfast) 100 tablet 3     fluticasone (FLONASE) 50 MCG/ACT spray Spray 1-2 sprays into both nostrils daily 1 Bottle 1     furosemide (LASIX) 40 MG tablet Take 1.5 tablets (60 mg) by mouth daily 90 tablet 3     hydrALAZINE (APRESOLINE) 25 MG tablet Take 1 tablet (25 mg) by mouth 2 times daily 60 tablet 1     insulin glargine (LANTUS SOLOSTAR) 100 UNIT/ML pen Inject 10 Units Subcutaneous every morning 15 mL 3     insulin pen needle 31G X 6 MM Use as directed 120 each 3     levothyroxine (SYNTHROID/LEVOTHROID) 200 MCG tablet TAKE 1 TABLET BY MOUTH EVERY DAY 30 tablet 1     metoprolol (TOPROL-XL) 25 MG 24 hr tablet Take 1 tablet (25 mg) by mouth daily 90 tablet 1     mometasone-formoterol (DULERA) 100-5 MCG/ACT oral inhaler Inhale 2 puffs into the lungs 2 times daily 1 Inhaler 1     nicotine polacrilex (COMMIT) 2 MG lozenge Dissolve 1 lozenge orally every 4 hours as directed. 100 lozenge 2     nitroGLYcerin (NITROSTAT) 0.4 MG sublingual tablet Place 1 tablet (0.4 mg) under  the tongue every 5 minutes as needed for chest pain 25 tablet 0     nystatin (MYCOSTATIN) 777636 UNIT/GM POWD Apply 1 g topically 3 times daily as needed 30 g 1     order for DME Equipment being ordered: Compression socks.  Strength:15-20 mmHg 2 each 0     order for DME Equipment being ordered: two pairs moderate knee high support hose 2 Device 1     order for DME One wheeled walker with seat and brakes and basket 1 Device 0     oxyCODONE IR (ROXICODONE) 10 MG tablet Take 1 tablet (10 mg) by mouth every 8 hours as needed 90 tablet 0     permethrin 1 % LIQD Wash hair with conditioner-free shampoo; rinse with water and towel dry. Apply a sufficient amount of lotion or cream rinse to saturate the hair and scalp (especially behind the ears and nape of neck). Leave on hair for 10 minutes (but no longer), then rinse off with warm water; remove remaining nits with nit comb.     May repeat 7 days after first treatment if lice or nits are still present 120 mL 0     polyethylene glycol (MIRALAX) powder Take 17 g (1 capful) by mouth daily as needed for constipation 510 g 2     sodium bicarbonate 325 MG tablet Take 2 tablets (650 mg) by mouth 2 times daily 360 tablet 3     tiotropium (SPIRIVA HANDIHALER) 18 MCG capsule Inhale contents of one capsule daily. 90 capsule 3     Allergies   Allergen Reactions     Contrast Dye Hives and Itching     Clonidine      She had as IP and thinks it made her itchy     Diatrizoate Other (See Comments)     Diltiazem      Severe bradycardia     Hydralazine      Tompkins tab patient thought made her itchy so stopped     Iodine-131      Recent Labs   Lab Test  05/16/18   1030  04/11/18   0931   01/16/18   1055   10/25/17   0639  10/20/17   1626   09/07/17   1135  05/10/17   1025   11/02/16   0622   A1C   --    --    --   6.0   --   6.6*   --    --   6.4*  6.6*   < >   --    LDL   --    --    --    --    --   104*   --    --   81  96   --    --    HDL   --    --    --    --    --   67   --    --   75   66   --    --    TRIG   --    --    --    --    --   212*   --    --   283*  303*   --    --    ALT   --    --    --    --    --   16   --    --   18   --    --   21   CR  3.86*  3.84*   < >  3.34*   < >  2.77*   --    < >  2.70*  2.25*   < >  2.64*   GFRESTIMATED  11*  12*   < >  14*   < >  17*   --    < >  17*  21*   < >  18*   GFRESTBLACK  14*  14*   < >  16*   < >  20*   --    < >  21*  26*   < >  22*   POTASSIUM  5.3  4.9   < >  4.8   < >  4.3   --    < >  6.2*  5.5*   < >  5.6*   TSH   --    --    --    --    --    --   1.04   --   71.77*   --    < >   --     < > = values in this interval not displayed.      BP Readings from Last 3 Encounters:   05/16/18 183/66   04/11/18 125/63   04/10/18 142/68    Wt Readings from Last 3 Encounters:   05/16/18 167 lb (75.8 kg)   04/11/18 166 lb (75.3 kg)   04/10/18 164 lb (74.4 kg)        Labs reviewed in EPIC  Problem list, Medication list, Allergies, and Medical/Social/Surgical histories reviewed in [facility] and updated as appropriate.     ROS: Constitutional, neuro, ENT, endocrine, pulmonary, cardiac, gastrointestinal, genitourinary, musculoskeletal, integument and psychiatric systems are negative, except as otherwise noted above in the HPI.     OBJECTIVE:                                                    /62 (BP Location: Right arm, Patient Position: Sitting, Cuff Size: Adult Regular)  Pulse 62  Temp 98.6  F (37  C) (Oral)  Resp 16  Wt 168 lb (76.2 kg)  SpO2 98%  BMI 27.12 kg/m2  Body mass index is 27.12 kg/(m^2).  GENERAL: healthy, alert, well nourished, well hydrated, no distress  EYES: Eyes grossly normal to inspection, extraocular movements - intact  RESP: lungs clear to auscultation - no rales, no rhonchi, no wheezes  CV: regular rates and rhythm, normal S1 S2, no S3 or S4 and no murmur, no click or rub   MS: extremities- no gross deformities noted, bilateral edema to LL; left > right.   SKIN: no suspicious lesions, no rashes  NEURO: strength and tone-  normal, sensory exam- grossly normal, mentation- intact, speech- normal, reflexes- symmetric  Non focal no aphasia. No facial asymmetry.   Mental Status Assessment:  Appearance:   Appropriate   Eye Contact:   Good   Psychomotor Behavior: Normal   Attitude:   Cooperative   Orientation:   All  Speech   Rate / Production: Normal    Volume:  Normal   Mood:    Normal  Affect:    Appropriate   Thought Content:  Clear   Thought Form:  Coherent  Logical   Insight:    Fair   Attention Span and Concentration: appropriate.   Recent and Remote Memory:  intact  Fund of Knowledge: appropriate  Muscle Strength and Tone: normal   Suicidal Ideation: reports no thoughts, no intention  See [facility] notes     Diagnostic Test Results:  Pending.      ASSESSMENT/PLAN:                                                    (E11.22,  N18.5) Type 2 diabetes mellitus with stage 5 chronic kidney disease not on chronic dialysis, without long-term current use of insulin (H)  (primary encounter diagnosis)  Comment: noted above.   Plan: HEMOGLOBIN A1C, Albumin Random Urine         Quantitative with Creat Ratio, order for DME        -Will follow up with patient in 4 weeks.     (K59.00) Constipation, unspecified constipation type  Comment: Stable with bowel prep medications.   Plan: docusate sodium (COLACE) 100 MG capsule        polyethylene glycol (MIRALAX) powder  -Refills done.     (M54.5,  G89.29) Chronic low back pain without sciatica, unspecified back pain laterality  Comment: Stable when medicated.   Plan: oxyCODONE IR (ROXICODONE) 10 MG tablet        Hard copy prescription for next month given to patient.     (R60.9) Edema, unspecified type  Comment: Bilateral lower leg edema.   Plan: order for DME        -Reordered compression socks.       Patient Instructions   -Refills done.     -Call clinic with any questions or concerns.     I spent 25 min spent in direct face to face time with Kim Anne, greater than 50% in counseling and  coordination of care for:  Diabetes, constipation. Chronic pain and Edema.       JUNIOR Lopez [facility]

## 2018-06-25 NOTE — PROGRESS NOTES
[facility] Care Coordination Contact  Arranged transportation thru [facility] for the below appt:  Appt Date & Time: 6/25 @ 11:30 am  Clinic Name & Address:   [facility]  Transportation Provider: [facility] [employer]   time:  10:30-11:00 am    Arranged transportation thru [facility] for the below appt:  Appt Date & Time: 6/27 @ 12:30 pm  Clinic Name & Address:  [address] [address]  Transportation Provider: [facility]   time:  11:30 am - 12:00 pm    Arranged transportation thru [facility] for the below appt:  Appt Date & Time: 8/03 @ 8:45 am  Clinic Name & Address:  [address] [address]  Transportation Provider: [facility]   time:  7:45-8:15 am    Notified Kim of  time.    Brianna Barrios  Case Management Specialist  [facility]   [phone]

## 2018-06-25 NOTE — PROGRESS NOTES
[facility] [place] Care Coordination Contact  Call placed to member to follow up on MA status.  [facility] is set to term 8/31. Unable to reach member, unable to leave voicemail.    Batool Mai RN, BSN, PHN  [facility] [place]  [phone]  Fax: [phone]

## 2018-06-26 ASSESSMENT — ANXIETY QUESTIONNAIRES: GAD7 TOTAL SCORE: 0

## 2018-06-26 ASSESSMENT — PATIENT HEALTH QUESTIONNAIRE - PHQ9: SUM OF ALL RESPONSES TO PHQ QUESTIONS 1-9: 0

## 2018-06-27 NOTE — PROGRESS NOTES
[facility] Care Coordination Contact  Second attempt to reach member to follow up on her MA status.  Unable to reach member. Unable to leave voicemail.  Batool Mai RN, BSN, PHN  [facility]  [phone]  Fax: [phone]

## 2018-07-02 DIAGNOSIS — E03.8 OTHER SPECIFIED HYPOTHYROIDISM: ICD-10-CM

## 2018-07-02 RX ORDER — LEVOTHYROXINE SODIUM 200 UG/1
200 TABLET ORAL DAILY
Qty: 90 TABLET | Refills: 0 | Status: SHIPPED | OUTPATIENT
Start: 2018-07-02 | End: 2018-11-26

## 2018-07-02 NOTE — TELEPHONE ENCOUNTER
Prescription approved per [facility] Refill Protocol.    Signed Prescriptions:                        Disp   Refills    levothyroxine (SYNTHROID/LEVOTHROID) 200 M*90 tab*0        Sig: Take 1 tablet (200 mcg) by mouth daily  Authorizing Provider: FLORENTIN STEWART  Ordering User: BIB GOODSON      Closing encounter - no further actions needed at this time    Bib Goodson RN

## 2018-07-02 NOTE — TELEPHONE ENCOUNTER
"Last Written Prescription Date:  2/27/18  Last Fill Quantity: 30,  # refills: 1   Last office visit: 6/25/2018 with prescribing provider:     Future Office Visit:   Next 5 appointments (look out 90 days)     Jul 26, 2018 11:00 AM CDT   Return Visit with JUNIOR Bishop CNP   [facility]    [address]  [address]  [facility] [address]   [phone]            Jul 26, 2018 11:00 AM CDT   Return Visit with LAMINE Chahal   [facility] ([facility])    [address]  [address]  [facility] [address]   [phone]                 Requested Prescriptions   Pending Prescriptions Disp Refills     levothyroxine (SYNTHROID/LEVOTHROID) 200 MCG tablet 30 tablet 1     Sig: Take 1 tablet (200 mcg) by mouth daily    Thyroid Protocol Passed    7/2/2018 11:33 AM       Passed - Patient is 12 years or older       Passed - Recent (12 mo) or future (30 days) visit within the authorizing provider's specialty    Patient had office visit in the last 12 months or has a visit in the next 30 days with authorizing provider or within the authorizing provider's specialty.  See \"Patient Info\" tab in inbasket, or \"Choose Columns\" in Meds & Orders section of the refill encounter.           Passed - Normal TSH on file in past 12 months    Recent Labs   Lab Test  10/20/17   1626   TSH  1.04             Passed - No active pregnancy on record    If patient is pregnant or has had a positive pregnancy test, please check TSH.         Passed - No positive pregnancy test in past 12 months    If patient is pregnant or has had a positive pregnancy test, please check TSH.            "

## 2018-07-05 ENCOUNTER — TELEPHONE (OUTPATIENT)
Dept: TRANSPLANT | Facility: CLINIC | Age: 73
End: 2018-07-05

## 2018-07-05 NOTE — TELEPHONE ENCOUNTER
Received a VM from pt asking for a return call. Reviewed pre kidney eval status - pt has successfully completed all components of evaluation to date except - still need dental clearance and still needs an appt with ID for pos TB Quant Gold with eval results. Returned call today to patient and her phone just rang and rang.  Plan to review pt's status with Transplant Team on 07/11/2018 to determine if all results/status remains acceptable.  Pt will remain listed on DD kidney alone list on INACTIVE until formally approved to change to ACTIVE.

## 2018-07-06 ENCOUNTER — CARE COORDINATION (OUTPATIENT)
Dept: NEPHROLOGY | Facility: CLINIC | Age: 73
End: 2018-07-06

## 2018-07-06 DIAGNOSIS — N18.4 CHRONIC KIDNEY DISEASE, STAGE 4 (SEVERE) (H): ICD-10-CM

## 2018-07-06 DIAGNOSIS — I10 ESSENTIAL HYPERTENSION: ICD-10-CM

## 2018-07-06 NOTE — TELEPHONE ENCOUNTER
hydrALAZINE (APRESOLINE) 25 MG tablet  Last Written Prescription Date:  4/10/18  Last Fill Quantity: 60,  # refills: 1   Last office visit: 6/25/2018 with prescribing provider:     Future Office Visit:   Next 5 appointments (look out 90 days)     Jul 26, 2018 11:00 AM CDT   Return Visit with JUNIOR Bishop CNP   [facility] ([facility]    [address]  [address]  [facility] [address]   [phone]            Jul 26, 2018 11:00 AM CDT   Return Visit with LAMINE Chahal   [facility] ([facility])    [address]  [address]  [facility] [address]   [phone]                 Requested Prescriptions   Pending Prescriptions Disp Refills     hydrALAZINE (APRESOLINE) 25 MG tablet 60 tablet 1     Sig: Take 1 tablet (25 mg) by mouth 2 times daily    There is no refill protocol information for this order

## 2018-07-06 NOTE — PROGRESS NOTES
Reason for Call    Attempted to reach patient for CKD 5 follow up. Call rang with no answer and did not go to . Will attempt to reach again next week.    Patient no-showed appointment with Fatimah Gardner NP last week. Is scheduled with Dr. Lopes on 8/3. Will assess for symptoms/ concerns to see if pt needs to come in sooner.    Ayush Oneal, RN, BAN  Nephrology RN Care Coordinator

## 2018-07-10 RX ORDER — HYDRALAZINE HYDROCHLORIDE 25 MG/1
25 TABLET, FILM COATED ORAL 2 TIMES DAILY
Qty: 60 TABLET | Refills: 1 | Status: SHIPPED | OUTPATIENT
Start: 2018-07-10 | End: 2018-08-03

## 2018-07-10 NOTE — TELEPHONE ENCOUNTER
LOV: 6/25/2018      Hydralazine  Routing refill request to provider for review/approval because:  Medication is reported/historical    Thanks! Sarah August RN

## 2018-07-20 ENCOUNTER — TELEPHONE (OUTPATIENT)
Dept: FAMILY MEDICINE | Facility: CLINIC | Age: 73
End: 2018-07-20

## 2018-07-20 NOTE — TELEPHONE ENCOUNTER
Reason for Call:  Form, our goal is to have forms completed with 72 hours, however, some forms may require a visit or additional information.    Type of letter, form or note:  medical - DMV Form    Who is the form from?: Patient    Where did the form come from: Patient or family brought in       What clinic location was the form placed at?: [facility]    Where the form was placed: 's Box    What number is listed as a contact on the form?:[phone]       Additional comments: Patient can  the form at her next appt. With Ailyn SEGURA    Call taken on 7/20/2018 at 4:02 PM by Cady Estevez

## 2018-07-24 ENCOUNTER — PATIENT OUTREACH (OUTPATIENT)
Dept: GERIATRIC MEDICINE | Facility: CLINIC | Age: 73
End: 2018-07-24

## 2018-07-24 NOTE — PROGRESS NOTES
[facility] Care Coordination Contact  Call placed to member regarding MA status.  [facility] is set to term 8/31/18.  Updated member.   Batool Mai RN, BSN, PHN  [facility]  [phone]  Fax: [phone]

## 2018-07-25 DIAGNOSIS — E11.9 TYPE 2 DIABETES MELLITUS WITHOUT COMPLICATION, WITH LONG-TERM CURRENT USE OF INSULIN (H): ICD-10-CM

## 2018-07-25 DIAGNOSIS — Z79.4 TYPE 2 DIABETES MELLITUS WITHOUT COMPLICATION, WITH LONG-TERM CURRENT USE OF INSULIN (H): ICD-10-CM

## 2018-07-25 RX ORDER — ISOPROPYL ALCOHOL 0.7 ML/1
SWAB TOPICAL
Refills: 11 | OUTPATIENT
Start: 2018-07-25

## 2018-07-26 ENCOUNTER — OFFICE VISIT (OUTPATIENT)
Dept: FAMILY MEDICINE | Facility: CLINIC | Age: 73
End: 2018-07-26
Payer: COMMERCIAL

## 2018-07-26 ENCOUNTER — OFFICE VISIT (OUTPATIENT)
Dept: PHARMACY | Facility: CLINIC | Age: 73
End: 2018-07-26
Payer: COMMERCIAL

## 2018-07-26 ENCOUNTER — OFFICE VISIT (OUTPATIENT)
Dept: BEHAVIORAL HEALTH | Facility: CLINIC | Age: 73
End: 2018-07-26
Payer: COMMERCIAL

## 2018-07-26 VITALS
HEART RATE: 78 BPM | TEMPERATURE: 98.4 F | SYSTOLIC BLOOD PRESSURE: 138 MMHG | DIASTOLIC BLOOD PRESSURE: 68 MMHG | WEIGHT: 167.5 LBS | RESPIRATION RATE: 16 BRPM | BODY MASS INDEX: 27.04 KG/M2 | OXYGEN SATURATION: 98 %

## 2018-07-26 DIAGNOSIS — N18.4 CONTROLLED TYPE 2 DIABETES MELLITUS WITH STAGE 4 CHRONIC KIDNEY DISEASE, WITH LONG-TERM CURRENT USE OF INSULIN (H): ICD-10-CM

## 2018-07-26 DIAGNOSIS — I10 HYPERTENSION GOAL BP (BLOOD PRESSURE) < 130/80: ICD-10-CM

## 2018-07-26 DIAGNOSIS — E03.9 ACQUIRED HYPOTHYROIDISM: ICD-10-CM

## 2018-07-26 DIAGNOSIS — E11.9 TYPE 2 DIABETES MELLITUS WITHOUT COMPLICATION, WITH LONG-TERM CURRENT USE OF INSULIN (H): Primary | ICD-10-CM

## 2018-07-26 DIAGNOSIS — F43.21 GRIEF: Primary | ICD-10-CM

## 2018-07-26 DIAGNOSIS — Z13.5 SCREENING FOR DIABETIC RETINOPATHY: Primary | ICD-10-CM

## 2018-07-26 DIAGNOSIS — E11.22 TYPE 2 DIABETES MELLITUS WITH STAGE 5 CHRONIC KIDNEY DISEASE NOT ON CHRONIC DIALYSIS, WITH LONG-TERM CURRENT USE OF INSULIN (H): ICD-10-CM

## 2018-07-26 DIAGNOSIS — N18.5 TYPE 2 DIABETES MELLITUS WITH STAGE 5 CHRONIC KIDNEY DISEASE NOT ON CHRONIC DIALYSIS, WITH LONG-TERM CURRENT USE OF INSULIN (H): ICD-10-CM

## 2018-07-26 DIAGNOSIS — I10 ESSENTIAL HYPERTENSION: ICD-10-CM

## 2018-07-26 DIAGNOSIS — F17.210 CIGARETTE NICOTINE DEPENDENCE WITHOUT COMPLICATION: ICD-10-CM

## 2018-07-26 DIAGNOSIS — J44.9 CHRONIC OBSTRUCTIVE PULMONARY DISEASE, UNSPECIFIED COPD TYPE (H): ICD-10-CM

## 2018-07-26 DIAGNOSIS — Z79.4 CONTROLLED TYPE 2 DIABETES MELLITUS WITH STAGE 4 CHRONIC KIDNEY DISEASE, WITH LONG-TERM CURRENT USE OF INSULIN (H): ICD-10-CM

## 2018-07-26 DIAGNOSIS — Z79.4 TYPE 2 DIABETES MELLITUS WITH STAGE 5 CHRONIC KIDNEY DISEASE NOT ON CHRONIC DIALYSIS, WITH LONG-TERM CURRENT USE OF INSULIN (H): ICD-10-CM

## 2018-07-26 DIAGNOSIS — Z79.4 TYPE 2 DIABETES MELLITUS WITHOUT COMPLICATION, WITH LONG-TERM CURRENT USE OF INSULIN (H): Primary | ICD-10-CM

## 2018-07-26 DIAGNOSIS — E11.22 CONTROLLED TYPE 2 DIABETES MELLITUS WITH STAGE 4 CHRONIC KIDNEY DISEASE, WITH LONG-TERM CURRENT USE OF INSULIN (H): ICD-10-CM

## 2018-07-26 LAB
CREAT UR-MCNC: 36 MG/DL
HBA1C MFR BLD: 5.8 % (ref 0–5.6)
MICROALBUMIN UR-MCNC: 3790 MG/L
MICROALBUMIN/CREAT UR: ABNORMAL MG/G CR (ref 0–25)
TSH SERPL DL<=0.005 MIU/L-ACNC: 1.12 MU/L (ref 0.4–4)

## 2018-07-26 PROCEDURE — 83036 HEMOGLOBIN GLYCOSYLATED A1C: CPT | Performed by: NURSE PRACTITIONER

## 2018-07-26 PROCEDURE — 84443 ASSAY THYROID STIM HORMONE: CPT | Performed by: NURSE PRACTITIONER

## 2018-07-26 PROCEDURE — 82043 UR ALBUMIN QUANTITATIVE: CPT | Performed by: NURSE PRACTITIONER

## 2018-07-26 PROCEDURE — 99214 OFFICE O/P EST MOD 30 MIN: CPT | Performed by: NURSE PRACTITIONER

## 2018-07-26 PROCEDURE — 90832 PSYTX W PT 30 MINUTES: CPT | Performed by: SOCIAL WORKER

## 2018-07-26 PROCEDURE — 99607 MTMS BY PHARM ADDL 15 MIN: CPT | Performed by: PHARMACIST

## 2018-07-26 PROCEDURE — 36415 COLL VENOUS BLD VENIPUNCTURE: CPT | Performed by: NURSE PRACTITIONER

## 2018-07-26 PROCEDURE — 99606 MTMS BY PHARM EST 15 MIN: CPT | Performed by: PHARMACIST

## 2018-07-26 NOTE — PROGRESS NOTES
[facility] - Integrated Primary Care   July 26, 2018      Behavioral Health Clinician Progress Note    Patient Name: Kim Anne           Service Type: Individual      Service Location:   Face to Face in Clinic     Session Start Time: 10:40am  Session End Time: 11:02am      Session Length: 16 - 37      Attendees: Patient, PCP and MTM    Visit Activities (Refresh list every visit): [facility] [place] [employer]    Diagnostic Assessment Date: TBD  Treatment Plan Review Date: TBD  See Flowsheets for today's PHQ-9 and JUSTINE-7 results  Previous PHQ-9:   PHQ-9 SCORE 2/12/2018 3/12/2018 6/25/2018   Total Score - - -   Total Score - - -   Total Score 0 0 0     Previous JUSTINE-7:   JUSTINE-7 SCORE 12/12/2016 3/12/2018 6/25/2018   Total Score - - -   Total Score 0 0 0   Total Score - - -       NAE LEVEL:  NAE Score (Last Two) 2/18/2013 5/23/2014   NAE Raw Score 48 45   Activation Score 80 73.1   NAE Level 4 4       DATA  Extended Session (60+ minutes): No  Interactive Complexity: No  Crisis: No    Treatment Objective(s) Addressed in This Session:  Target Behavior(s): disease management/lifestyle changes manage diabetes better    Grief / Loss: will process grief/loss issues in an adaptive manner  Psychological distress related to Diabetes    Current Stressors / Issues:  [facility] [place] met with patient at PCP request to assess current behavioral health needs and provide information and support as necessary.  Pt reported that she has been doing well. Pt reported that her mood has been stable however she has been getting upset at times. Pt reported that her grandson along with his two children and girlfriend are all living with her. Pt stated that her grandson's girlfriend eats all of their food and doesn't do anything around the house to help out. Pt stated that she wants her to leave but they don't have anywhere to go and she worries about the children. Writer encouraged pt to talk to her grandson and his girlfriend about the expectations of living  with her and what they need to do in order to continue to live there. Pt stated that it is hard a situation which writer validated. Pt reported that she is smoking about 3 cigarettes a day and is trying to hard to quit. Pt reported that she is sleeping well and her appetite has been good. Pt stated that she is going on walks on a consistent basis.    Reviewed new and ongoing stressors  Reviewed mental health symptoms and appropriate coping mechanisms    I affirmed the steps this patient has taken to address physical and behavioral health issues, and offered continued behavioral health services or referral, now or in the future, as needed by the patient.    Progress on Treatment Objective(s) / Homework:  Satisfactory progress - CONTEMPLATION (Considering change and yet undecided); Intervened by assessing the negative and positive thinking (ambivalence) about behavior change    Motivational Interviewing    MI Intervention: Expressed Empathy/Understanding, Supported Autonomy, Collaboration, Evocation, Open-ended questions and Reflections: simple and complex     Change Talk Expressed by the Patient: Committment to change Activation Taking steps    Provider Response to Change Talk: E - Evoked more info from patient about behavior change, A - Affirmed patient's thoughts, decisions, or attempts at behavior change, R - Reflected patient's change talk and S - Summarized patient's change talk statements      Care Plan review completed: No    Medication Review:  No changes to current psychiatric medication(s)    Medication Compliance:  Yes    Changes in Health Issues:   None reported    Chemical Use Review:   Substance Use: Chemical use reviewed, no active concerns identified      Tobacco Use: Yes, increase.  Client reports frequency of use 6 cigarettes daily. Reviewed information and resources for quitting  Reviewed options for assistance and intervention  Provided encouragement to quit     Assessment: Current Emotional /  Mental Status (status of significant symptoms):  Risk status (Self / Other harm or suicidal ideation)  Patient denies a history of suicidal ideation, suicide attempts, self-injurious behavior, homicidal ideation, homicidal behavior and and other safety concerns  Patient denies current fears or concerns for personal safety.  Patient denies current or recent suicidal ideation or behaviors.  Patient denies current or recent homicidal ideation or behaviors.  Patient denies current or recent self injurious behavior or ideation.  Patient denies other safety concerns.  A safety and risk management plan has not been developed at this time, however patient was encouraged to call [place] [phone] should there be a change in any of these risk factors.    Appearance:   Appropriate   Eye Contact:   Fair   Psychomotor Behavior: Normal   Attitude:   Guarded   Orientation:   All  Speech   Rate / Production: Impoverished  Monotone    Volume:  Normal   Mood:    Normal  Affect:    Blunted  Flat   Thought Content:  Clear   Thought Form:  Logical   Insight:    Fair     Diagnoses:  1. Grief        Collateral Reports Completed:  Not Applicable    Plan: (Homework, other):  Patient was given information about behavioral services and encouraged to schedule a follow up appointment with the clinic [facility] as needed.  She was also given information about mental health symptoms and treatment options .  CD Recommendations: Maintain Sobriety.  LAMINE Ndiaye, [facility]      ______________________________________________________________________    Integrated Primary Care Behavioral Health Treatment Plan    Patient's Name: Kim Anen  YOB: 1945    Date: To be completed

## 2018-07-26 NOTE — PROGRESS NOTES
SUBJECTIVE:                                                    Kim Anne is a 72 year old  female who presents to clinic today for the following health issues:  [facility]: [facility]  MT: [place]    Patient would like to have her paperwork for driving completed today.     Diabetes Follow-up  Blood sugars ranging  all done in the morning. Taking her Lantus 10 units in the morning.   Patient is checking blood sugars: 1 times daily.    Blood sugar testing frequency justification: Patient modifying lifestyle changes (diet, exercise) with blood sugars  Results are as follows:         am -     Diabetic concerns: None     Symptoms of hypoglycemia (low blood sugar): none     Paresthesias (numbness or burning in feet) or sores: No     Date of last diabetic eye exam: about 1 year ago    BP Readings from Last 2 Encounters:   06/25/18 148/62   05/16/18 183/66     Hemoglobin A1C (%)   Date Value   01/16/2018 6.0   10/25/2017 6.6 (H)     LDL Cholesterol Calculated (mg/dL)   Date Value   10/25/2017 104 (H)   09/07/2017 81     Diabetes Management Resources        Mental Health Follow up     Status since last visit: Patient reports having difficulty with her grandson's girlfriend. Patient living with her grandson, his girlfriend and two young kids. Patient reports that she is unable to kick them out because they have nowhere to go.     See PHQ-9 for current symptoms.  Other associated symptoms: None    Complicating factors: none.   Significant life event:  No   Current substance abuse:  None  Anxiety or Panic symptoms:  No    Sleep - good, no concerns.   Appetite - good, no issues.   Exercise - walking.     Smoking - smoking about 3-4 per day.   Alcohol - no  Street drugs - none.   Marijuana - no  Caffeine - 1-2 cups of coffee in the morning.     PHQ-9  English PHQ-9   Any Language             Hypertension Follow-up    Outpatient blood pressures are being checked at home.  Results are BP ranging  111-163/67-79 with HR 67-89. Patient not sure what she is taking for her BP medications. Sometimes she sets them up and other times her daughter will set-up her medications for her. Patient denies any headaches, dizziness, chest pain, shortness of breath or heart palpitations.     Low Salt Diet: no added salt        Problems taking medications regularly: No    Medication side effects: none    Diet: regular (no restrictions)    Social History   Substance Use Topics     Smoking status: Current Every Day Smoker     Packs/day: 0.80     Years: 40.00     Types: Cigarettes     Last attempt to quit: 10/31/2016     Smokeless tobacco: Never Used     Alcohol use No        Problem list and histories reviewed & adjusted, as indicated.  Additional history: as documented    Patient Active Problem List   Diagnosis     Hyperlipidemia LDL goal <100     ARAUZ (dyspnea on exertion)     COPD (chronic obstructive pulmonary disease) (H)     Edema     Fatigue     History of MI (myocardial infarction)     Snores     Knee pain, left     Bunion of great toe of left foot     Dystrophic nail     Arthritis     Peripheral vascular disease (H)     Chronic low back pain     Health Care Home     CKD (chronic kidney disease) stage 4, GFR 15-29 ml/min (H)     Abnormal gait     Advance Care Planning     Vitamin D deficiency     Constipation     Hypertension goal BP (blood pressure) < 130/80     Bradycardia     Callus of foot     Other chronic pain     Cataracts, bilateral     Hyperopic astigmatism of both eyes     Presbyopia     Asymptomatic bunion, right     Acquired hypothyroidism     Type 2 diabetes mellitus with diabetic chronic kidney disease (H)     Hypertension associated with diabetes (H)     Hyperlipidemia associated with type 2 diabetes mellitus (H)     Obesity (BMI 30-39.9)     History of sick sinus syndrome     Nephrotic syndrome     Pneumonia     Grief     Cigarette nicotine dependence without complication     HCOM with LVOT     Hyperkalemia      Type 2 diabetes, HbA1C goal < 8% (H)     Smoker     Single kidney     Hypothyroid     Hypertension goal BP (blood pressure) < 140/90     Hyperlipidemia     Diabetic nephropathy (H)     Hypoalbuminemia     Skin lesion of hand     ERRONEOUS ENCOUNTER--DISREGARD     Past Surgical History:   Procedure Laterality Date     APPENDECTOMY       BLEPHAROPLASTY BILATERAL  2013    Procedure: BLEPHAROPLASTY BILATERAL;  BILATERAL UPPER EYELID BLEPHAROPLASTY ;  Surgeon: Olayinka Lyon MD;  Location: [facility]     CATARACT IOL, RT/LT Bilateral      CHOLECYSTECTOMY       COLONOSCOPY  7/15/2011    polyps repeat in 5 years     elected term pregnancy       HYSTEROSCOPIC PLACEMENT CONTRACEPTIVE DEVICE       KIDNEY SURGERY      donated left kideny     OVARY SURGERY      left for cyst benign     subclavian stent  2010    [facility]       Social History   Substance Use Topics     Smoking status: Current Every Day Smoker     Packs/day: 0.80     Years: 40.00     Types: Cigarettes     Last attempt to quit: 10/31/2016     Smokeless tobacco: Never Used     Alcohol use No     Family History   Problem Relation Age of Onset     Diabetes Mother      brother, MGM, sister     KIDNEY DISEASE Brother      X2 DM two      Alcohol/Drug Child      daughter     Asthma No family hx of      C.A.D. No family hx of      Hypertension No family hx of      Cerebrovascular Disease No family hx of      Breast Cancer No family hx of      Cancer - colorectal No family hx of      Prostate Cancer No family hx of      Allergies No family hx of      Alzheimer Disease No family hx of      Anesthesia Reaction No family hx of      Arthritis No family hx of      Blood Disease No family hx of      Cancer No family hx of      Cardiovascular No family hx of      Circulatory No family hx of      Congenital Anomalies No family hx of      Connective Tissue Disorder No family hx of      Depression No family hx of      Eye Disorder No family hx of       Genetic Disorder No family hx of      GASTROINTESTINAL DISEASE No family hx of      Genitourinary Problems No family hx of      Gynecology No family hx of      HEART DISEASE No family hx of      Lipids No family hx of      Musculoskeletal Disorder No family hx of      Neurologic Disorder No family hx of      Obesity No family hx of      Osteoperosis No family hx of      Psychotic Disorder No family hx of      Respiratory No family hx of      Thyroid Disease No family hx of      Glaucoma No family hx of      Macular Degeneration No family hx of            Current Outpatient Prescriptions   Medication Sig Dispense Refill     albuterol (PROAIR HFA/PROVENTIL HFA/VENTOLIN HFA) 108 (90 BASE) MCG/ACT Inhaler Inhale 2 puffs into the lungs every 6 hours as needed for shortness of breath / dyspnea or wheezing 18 g 3     Alcohol Swabs (SM ALCOHOL PREP) 70 % PADS Externally apply 1 pad topically 4 times daily 100 each 11     amLODIPine (NORVASC) 5 MG tablet Take 2 tablets (10 mg) by mouth daily 60 tablet 3     ASPIR-LOW 81 MG EC tablet TAKE 1 TABLET BY MOUTH EVERY  tablet 2     atorvastatin (LIPITOR) 40 MG tablet Take 1 tablet (40 mg) by mouth daily 90 tablet 1     blood glucose monitoring (FREESTYLE LITE) test strip TEST BLOOD SUGAR TWO TIMES A DAY 50 strip 12     blood glucose monitoring (FREESTYLE) lancets Test BS two times daily as directed 100 each 1     blood glucose monitoring (NO BRAND SPECIFIED) meter device kit Use to test blood sugar 2 times daily or as directed. Preferred blood glucose meter OR supplies to accompany: Blood Glucose Monitor Brands: per insurance. 1 kit 0     Blood Pressure Monitor KIT Automatic Blood Pressure Monitor 1 kit 0     Cholecalciferol (VITAMIN D) 2000 units tablet Take 2,000 Units by mouth daily 60 tablet 2     docusate sodium (COLACE) 100 MG capsule Take 1 capsule (100 mg) by mouth 2 times daily 60 capsule 4     Emollient (EUCERIN CALMING DAILY MOIST) CREA Externally apply 1 dose *  topically daily 1 Tube 12     ferrous sulfate (IRON) 325 (65 FE) MG tablet Take 1 tablet (325 mg) by mouth daily (with breakfast) 100 tablet 3     fluticasone (FLONASE) 50 MCG/ACT spray Spray 1-2 sprays into both nostrils daily 1 Bottle 1     furosemide (LASIX) 40 MG tablet Take 1.5 tablets (60 mg) by mouth daily 90 tablet 3     hydrALAZINE (APRESOLINE) 25 MG tablet Take 1 tablet (25 mg) by mouth 2 times daily 60 tablet 1     insulin glargine (LANTUS SOLOSTAR) 100 UNIT/ML pen Inject 10 Units Subcutaneous every morning 15 mL 3     insulin pen needle 31G X 6 MM Use as directed 120 each 3     levothyroxine (SYNTHROID/LEVOTHROID) 200 MCG tablet Take 1 tablet (200 mcg) by mouth daily 90 tablet 0     metoprolol (TOPROL-XL) 25 MG 24 hr tablet Take 1 tablet (25 mg) by mouth daily 90 tablet 1     mometasone-formoterol (DULERA) 100-5 MCG/ACT oral inhaler Inhale 2 puffs into the lungs 2 times daily 1 Inhaler 1     nicotine polacrilex (COMMIT) 2 MG lozenge Dissolve 1 lozenge orally every 4 hours as directed. 100 lozenge 2     nitroGLYcerin (NITROSTAT) 0.4 MG sublingual tablet Place 1 tablet (0.4 mg) under the tongue every 5 minutes as needed for chest pain 25 tablet 0     nystatin (MYCOSTATIN) 264476 UNIT/GM POWD Apply 1 g topically 3 times daily as needed 30 g 1     order for DME Equipment being ordered: Diabetic Shoes 1 each 0     order for DME Equipment being ordered: two pairs moderate knee high support hose 2 Device 1     order for DME Equipment being ordered: Compression socks.  Strength:15-20 mmHg 2 each 0     order for DME One wheeled walker with seat and brakes and basket 1 Device 0     oxyCODONE IR (ROXICODONE) 10 MG tablet Take 1 tablet (10 mg) by mouth every 8 hours as needed 90 tablet 0     permethrin 1 % LIQD Wash hair with conditioner-free shampoo; rinse with water and towel dry. Apply a sufficient amount of lotion or cream rinse to saturate the hair and scalp (especially behind the ears and nape of neck). Leave  on hair for 10 minutes (but no longer), then rinse off with warm water; remove remaining nits with nit comb.     May repeat 7 days after first treatment if lice or nits are still present 120 mL 0     polyethylene glycol (MIRALAX) powder Take 17 g (1 capful) by mouth daily as needed for constipation 510 g 2     sodium bicarbonate 325 MG tablet Take 2 tablets (650 mg) by mouth 2 times daily 360 tablet 3     tiotropium (SPIRIVA HANDIHALER) 18 MCG capsule Inhale contents of one capsule daily. 90 capsule 3     Allergies   Allergen Reactions     Contrast Dye Hives and Itching     Clonidine      She had as IP and thinks it made her itchy     Diatrizoate Other (See Comments)     Diltiazem      Severe bradycardia     Hydralazine      [place] tab patient thought made her itchy so stopped     Iodine-131      Recent Labs   Lab Test  05/16/18   1030  04/11/18   0931   01/16/18   1055   10/25/17   0639  10/20/17   1626   09/07/17   1135  05/10/17   1025   11/02/16   0622   A1C   --    --    --   6.0   --   6.6*   --    --   6.4*  6.6*   < >   --    LDL   --    --    --    --    --   104*   --    --   81  96   --    --    HDL   --    --    --    --    --   67   --    --   75  66   --    --    TRIG   --    --    --    --    --   212*   --    --   283*  303*   --    --    ALT   --    --    --    --    --   16   --    --   18   --    --   21   CR  3.86*  3.84*   < >  3.34*   < >  2.77*   --    < >  2.70*  2.25*   < >  2.64*   GFRESTIMATED  11*  12*   < >  14*   < >  17*   --    < >  17*  21*   < >  18*   GFRESTBLACK  14*  14*   < >  16*   < >  20*   --    < >  21*  26*   < >  22*   POTASSIUM  5.3  4.9   < >  4.8   < >  4.3   --    < >  6.2*  5.5*   < >  5.6*   TSH   --    --    --    --    --    --   1.04   --   71.77*   --    < >   --     < > = values in this interval not displayed.      BP Readings from Last 3 Encounters:   06/25/18 148/62   05/16/18 183/66   04/11/18 125/63    Wt Readings from Last 3 Encounters:   06/25/18 168 lb  (76.2 kg)   05/16/18 167 lb (75.8 kg)   04/11/18 166 lb (75.3 kg)        Labs reviewed in EPIC  Problem list, Medication list, Allergies, and Medical/Social/Surgical histories reviewed in [facility] and updated as appropriate.     ROS: Constitutional, neuro, ENT, endocrine, pulmonary, cardiac, gastrointestinal, genitourinary, musculoskeletal, integument and psychiatric systems are negative, except as otherwise noted above in the HPI.    OBJECTIVE:                                                    /68 (BP Location: Right arm)  Pulse 78  Temp 98.4  F (36.9  C) (Oral)  Resp 16  Wt 167 lb 8 oz (76 kg)  SpO2 98%  BMI 27.04 kg/m2  Body mass index is 27.04 kg/(m^2).  GENERAL: healthy, alert, well nourished, well hydrated, no distress  EYES: Eyes grossly normal to inspection, extraocular movements - intact  RESP: lungs clear to auscultation - no rales, no rhonchi, no wheezes  CV: regular rates and rhythm, normal S1 S2, no S3 or S4 and no murmur, no click or rub - no homans or cords  MS: extremities- no gross deformities noted, no edema  NEURO: strength and tone- normal, sensory exam- grossly normal, mentation- intact, speech- normal,  Non focal no aphasia. No facial asymmetry.  LYMPHATICS: ant. cervical- normal, post. cervical- normal, supraclavicular- normal  Mental Status Assessment:  Appearance:   Appropriate   Eye Contact:   Good   Psychomotor Behavior: Normal   Attitude:   Cooperative   Orientation:   All  Speech   Rate / Production: Normal    Volume:  Soft   Mood:    Normal  Affect:    Appropriate   Thought Content:  Clear   Thought Form:  Coherent  Logical   Insight:    Fair   Attention Span and Concentration: appropriate  Recent and Remote Memory:  intact  Fund of Knowledge: appropriate  Muscle Strength and Tone: normal     See [facility] notes     Diagnostic Test Results:  Results for orders placed or performed in visit on 07/26/18   TSH with free T4 reflex   Result Value Ref Range    TSH 1.12 0.40 - 4.00 mU/L    Hemoglobin A1c   Result Value Ref Range    Hemoglobin A1C 5.8 (H) 0 - 5.6 %   Albumin Random Urine Quantitative with Creat Ratio   Result Value Ref Range    Creatinine Urine 36 mg/dL    Albumin Urine mg/L 3790 mg/L    Albumin Urine mg/g Cr 98449.60 (H) 0 - 25 mg/g Cr     *Note: Due to a large number of results and/or encounters for the requested time period, some results have not been displayed. A complete set of results can be found in Results Review.        ASSESSMENT/PLAN:                                                    (Z13.5) Screening for diabetic retinopathy  (primary encounter diagnosis)  Comment: Routine screening.   Plan: OPHTHALMOLOGY ADULT REFERRAL        -Patient will be called to schedule exam.     (I10) Hypertension goal BP (blood pressure) < 130/80  Comment: Patient denies any headaches, chest pain, heart palpitations, dizziness, shortness of breath.   BP Readings from Last 3 Encounters:   07/26/18 138/68   06/25/18 148/62   05/16/18 183/66     Plan: Continue with current medications with no changes.   -Patient to bring in all medications at the next visit for confirmation of what she is currently taking.     (E11.22,  N18.5,  Z79.4) Type 2 diabetes mellitus with stage 5 chronic kidney disease not on chronic dialysis, with long-term current use of insulin (H)  Comment: Noted above.   Plan: Hemoglobin A1c, Albumin Random Urine; insulin glargine (LANTUS SOLOSTAR) 100 UNIT/ML         pen        Quantitative with Creat Ratio        -Decrease Lantus to 8 units daily due to low blood sugars in the morning.     (E03.9) Acquired hypothyroidism  Comment: Routine follow-up. Denies any heat or cold intolerance, mood changes, or heart palpitations.   Plan: TSH with free T4 reflex        Normal thyroid level.       Patient Instructions   -Make sure you have Ventolin or ProAir to use as needed for shortness of breath.   -Bring your Blood Pressure machine and all your pill bottles at the next visit.    -Decrease Lantus to 8 units per day.     I spent 25 min spent in direct face to face time with Kim Anne, greater than 50% in counseling and coordination of care for:  Diabetes and hypertension.       JUNIOR Lopez [facility] PRIMARY CARE

## 2018-07-26 NOTE — PROGRESS NOTES
SUBJECTIVE/OBJECTIVE:                Kim Anne is a 72 year old female coming in for a follow-up visit for Medication Therapy Management.  She was referred to me from Ailyn Nieves. Seen as a covisit with PCP and [facility] Lackey [facility].     Chief Complaint: Follow up from our visit on 4-10-18.    Tobacco: 0-1 pack per day - is interested in quittingTobacco Cessation Action Plan: NRT when able obtain  Alcohol: not currently using    Medication Adherence/Access:  Patient's granddaughter helps pt st up meds. Sometimes patient sets them up herself. Uses a weekly pill box.    Diabetes:  Pt currently taking Lantus 10 units daily. Pt is not experiencing side effects.  SMBG: one time daily.   Ranges (patient reported): see below  Reports feeling good overall.  Patient is not experiencing hypoglycemia.  Recent symptoms of high blood sugar? none  Eye exam: due-Patient would like to schedule this today.  Foot exam: Up to date.  ACEi/ARB: No-due to history of hyperkalemia.   Urine Albumin:   Lab Results   Component Value Date    UMALCR 6325.80 (H) 07/07/2017      Date FBG/ 2hours post Lunch/2hours post Dinner /2hours post    7/26 84      7/25 153      7/24 78      7/23 83      7/22 87      7/21 157      7/20 78        Aspirin: Taking 81mg daily and denies side effects.  Diet/Exercise: Reports walking about 10 minutes daily with family.  Hemoglobin A1C   Date Value Ref Range Status   07/26/2018 5.8 (H) 0 - 5.6 % Final     Comment:     Normal <5.7% Prediabetes 5.7-6.4%  Diabetes 6.5% or higher - adopted from ADA   consensus guidelines.         Hypertension: Current therapy includes hydralazine 25mg BID, amlodipine 10mg daily, and metoprolol 25mg daily. Patient does self-monitor BP. Home BP monitoring in range of 150's-160's systolic over 60's-70's diastolic.  Patient reports no current medication side effects. Reports no dizziness or lightheadedness. Patient reports previously bringing in her blood pressure machine to  check accuracy. Patient states she has the same BP machine and it might not be as accurate since it has been dropped a couple times since she first got it.    Smoking Cessation: Current therapy includes nicotine 2mg lozenges. Patient reports that she hasn't had to use them. Reports no medication side effects. Patient currently smoking about 3 cigarettes per day. Patient reports getting help through prayer. Patient not ready to completely quit smoking.   Why does she smoke: Stress reliever.      COPD: Current therapy includes none. Prescribed Dulera 2 puffs BID, Spiriva-1 capsule daily, and albuterol PRN. Patient reports not needing to use her inhalers recently. Unsure which inhalers she has at home. Reports no SOB or cough.  Pt is not experiencing side effects.   Pt reports the following symptoms: none.  Pt does have an COPD Action Plan on file.     Today's Vitals:   BP Readings from Last 3 Encounters:   07/26/18 138/68   06/25/18 148/62   05/16/18 183/66       ASSESSMENT:              Current medications were reviewed today as discussed above.      Medication Adherence: Fair, needs improvement-see below.     Diabetes: Needs improvement. Patient is well below goal of <8%, due for recheck. Patient has experienced a couple of lows of <80 and would benefit from reducing her lantus to 8 units daily to reduce risk of hypoglycemia.     Hypertension: Needs improvement.  BP is not at goal of <130/80. Discussed with patient about bringing in BP machine to check accuracy against clinic's BP machine to make sure patient is getting accurate readings.    Smoking Cessation: needs improvement. Patient would benefit from quitting for overall health. Will continue to discuss    COPD: Stable. Patient should check at home to make sure she has her albuterol inhaler available if needed. Patient should continue to use her albuterol inhaler PRN and will plan to address her medication adherence at future visits.      PLAN:                   1. Decrease Lantus to 8u daily    2. Pt to check at home for albuterol to use PRN    I spent 30 minutes with this patient today. All changes were made via collaborative practice agreement with Ailyn Nieves. A copy of the visit note was provided to the patient's primary care provider.     Will follow up in 3 weeks.    The patient was given a summary of these recommendations as an after visit summary.    Linda Clark, PharmD Candidate     Carmen Elder, AnastasiiaD, BCACP

## 2018-07-26 NOTE — MR AVS SNAPSHOT
"              After Visit Summary   7/26/2018    Kim Anne    MRN: [id number]           Patient Information     Date Of Birth          1945        Visit Information        Provider Department      7/26/2018 11:00 AM [facility]iva Lackey LIC[facility]        Today's Diagnoses     Grief    -  1       Follow-ups after your visit        Your next 10 appointments already scheduled     Aug 03, 2018  8:45 AM CDT   Lab with  LAB   [facility] ([facility])    [address]  1st Floor  [place] [address] [phone]            Aug 03, 2018  9:40 AM CDT   (Arrive by 9:10 AM)   Return Visit with Bhargav Lopes MD   [facility] ([facility])    [address]  [address]  [place] [address] [phone]              Who to contact     If you have questions or need follow up information about today's clinic visit or your schedule please contact [facility] directly at [phone].  Normal or non-critical lab and imaging results will be communicated to you by SuperCloudhart, letter or phone within 4 business days after the clinic has received the results. If you do not hear from us within 7 days, please contact the clinic through SuperCloudhart or phone. If you have a critical or abnormal lab result, we will notify you by phone as soon as possible.  Submit refill requests through TriCipher or call your pharmacy and they will forward the refill request to us. Please allow 3 business days for your refill to be completed.          Additional Information About Your Visit        MyChart Information     TriCipher lets you send messages to your doctor, view your test results, renew your prescriptions, schedule appointments and more. To sign up, go to [email or web address] . Click on \"Log in\" on the left side of the screen, which will take you to the Welcome page. Then click on \"Sign up " "Now\" on the right side of the page.     You will be asked to enter the access code listed below, as well as some personal information. Please follow the directions to create your username and password.     Your access code is: [email or web address]  Expires: 2018  6:30 AM     Your access code will  in 90 days. If you need help or a new code, please call your [facility] [employer] clinic or [phone].        Care EveryWhere ID     This is your Care EveryWhere ID. This could be used by other organizations to access your [employer] [facility] medical records  [id number]         Blood Pressure from Last 3 Encounters:   18 138/68   18 148/62   18 183/66    Weight from Last 3 Encounters:   18 167 lb 8 oz (76 kg)   18 168 lb (76.2 kg)   18 167 lb (75.8 kg)              Today, you had the following     No orders found for display       Primary Care Provider Office Phone # Fax #    AilynJUNIOR Blackwell [phone]       [address] [facility] [address]  [facility] [address]        Equal Access to Services     DIMPLE TOLLIVER : Hadii aad ku hadjao Sofranciscaali, waaxda luqadaha, qaybta kaalmada adeegyada, kassandra white . So [facility] [phone].    ATENCIÓN: Si habla español, tiene a bailey disposición servicios gratuitos de asistencia lingüística. Khadijah al [phone].    We comply with applicable federal civil rights laws and [place] laws. We do not discriminate on the basis of race, color, national origin, age, disability, sex, sexual orientation, or gender identity.            Thank you!     Thank you for choosing [facility]  for your care. Our goal is always to provide you with excellent care. Hearing back from our patients is one way we can continue to improve our services. Please take a few minutes to complete the written survey that you may receive in the mail after your visit with us. Thank you!             Your Updated Medication List - Protect " others around you: Learn how to safely use, store and throw away your medicines at www.disposemymeds.org.          This list is accurate as of 7/26/18 11:02 AM.  Always use your most recent med list.                   Brand Name Dispense Instructions for use Diagnosis    albuterol 108 (90 Base) MCG/ACT Inhaler    PROAIR HFA/PROVENTIL HFA/VENTOLIN HFA    18 g    Inhale 2 puffs into the lungs every 6 hours as needed for shortness of breath / dyspnea or wheezing    Chronic obstructive pulmonary disease, unspecified COPD type (H)       amLODIPine 5 MG tablet    NORVASC    60 tablet    Take 2 tablets (10 mg) by mouth daily    Renovascular hypertension       ASPIR-LOW 81 MG EC tablet   Generic drug:  aspirin     120 tablet    TAKE 1 TABLET BY MOUTH EVERY DAY    History of MI (myocardial infarction), Essential hypertension, benign       atorvastatin 40 MG tablet    LIPITOR    90 tablet    Take 1 tablet (40 mg) by mouth daily    Hyperlipidemia LDL goal <100       blood glucose monitoring lancets     100 each    Test BS two times daily as directed    Type 2 diabetes mellitus without complication, with long-term current use of insulin (H)       blood glucose monitoring meter device kit    no brand specified    1 kit    Use to test blood sugar 2 times daily or as directed. Preferred blood glucose meter OR supplies to accompany: Blood Glucose Monitor Brands: per insurance.    Type 2 diabetes mellitus without complication, with long-term current use of insulin (H)       blood glucose monitoring test strip    FREESTYLE LITE    50 strip    TEST BLOOD SUGAR TWO TIMES A DAY    Type 2 diabetes mellitus without complication, with long-term current use of insulin (H)       Blood Pressure Monitor Kit     1 kit    Automatic Blood Pressure Monitor    Renovascular hypertension       docusate sodium 100 MG capsule    COLACE    60 capsule    Take 1 capsule (100 mg) by mouth 2 times daily    Constipation, unspecified constipation type        EUCERIN CALMING DAILY MOIST Crea     1 Tube    Externally apply 1 dose * topically daily    Type II or unspecified type diabetes mellitus with neurological manifestations, not stated as uncontrolled(250.60) (H)       ferrous sulfate 325 (65 Fe) MG tablet    IRON    100 tablet    Take 1 tablet (325 mg) by mouth daily (with breakfast)    Iron deficiency anemia, unspecified iron deficiency anemia type       fluticasone 50 MCG/ACT spray    FLONASE    1 Bottle    Spray 1-2 sprays into both nostrils daily    Acute nasopharyngitis       furosemide 40 MG tablet    LASIX    90 tablet    Take 1.5 tablets (60 mg) by mouth daily    Hyperkalemia, Edema, unspecified type       hydrALAZINE 25 MG tablet    APRESOLINE    60 tablet    Take 1 tablet (25 mg) by mouth 2 times daily    Essential hypertension, Chronic kidney disease, stage 4 (severe) (H)       insulin glargine 100 UNIT/ML injection    LANTUS    15 mL    Inject 10 Units Subcutaneous every morning    Controlled type 2 diabetes mellitus with stage 4 chronic kidney disease, with long-term current use of insulin (H)       insulin pen needle 31G X 6 MM     120 each    Use as directed    Type 2 diabetes mellitus without complication, with long-term current use of insulin (H)       levothyroxine 200 MCG tablet    SYNTHROID/LEVOTHROID    90 tablet    Take 1 tablet (200 mcg) by mouth daily    Other specified hypothyroidism       metoprolol succinate 25 MG 24 hr tablet    TOPROL-XL    90 tablet    Take 1 tablet (25 mg) by mouth daily    Hypertension associated with diabetes (H)       mometasone-formoterol 100-5 MCG/ACT oral inhaler    DULERA    1 Inhaler    Inhale 2 puffs into the lungs 2 times daily    COPD (chronic obstructive pulmonary disease) (H)       nicotine polacrilex 2 MG lozenge    COMMIT    100 lozenge    Dissolve 1 lozenge orally every 4 hours as directed.    Chronic obstructive pulmonary disease, unspecified COPD type (H)       nitroGLYcerin 0.4 MG sublingual tablet     NITROSTAT    25 tablet    Place 1 tablet (0.4 mg) under the tongue every 5 minutes as needed for chest pain    History of MI (myocardial infarction)       nystatin 744198 UNIT/GM Powd    MYCOSTATIN    30 g    Apply 1 g topically 3 times daily as needed    Groin rash       * order for DME     1 Device    One wheeled walker with seat and brakes and basket    Risk for falls       * order for DME     2 each    Equipment being ordered: Compression socks. Strength:15-20 mmHg    Localized edema       order for DME     1 each    Equipment being ordered: Diabetic Shoes    Type 2 diabetes mellitus with stage 5 chronic kidney disease not on chronic dialysis, without long-term current use of insulin (H)       order for DME     2 Device    Equipment being ordered: two pairs moderate knee high support hose    Edema, unspecified type       oxyCODONE IR 10 MG tablet    ROXICODONE    90 tablet    Take 1 tablet (10 mg) by mouth every 8 hours as needed    Chronic low back pain without sciatica, unspecified back pain laterality       polyethylene glycol powder    MIRALAX    510 g    Take 17 g (1 capful) by mouth daily as needed for constipation    Slow transit constipation       SM ALCOHOL PREP 70 % Pads     100 each    Externally apply 1 pad topically 4 times daily    Type 2 diabetes mellitus without complication, with long-term current use of insulin (H)       sodium bicarbonate 325 MG tablet     360 tablet    Take 2 tablets (650 mg) by mouth 2 times daily    Acidosis, CKD (chronic kidney disease) stage 4, GFR 15-29 ml/min (H)       tiotropium 18 MCG capsule    SPIRIVA HANDIHALER    90 capsule    Inhale contents of one capsule daily.    Pulmonary emphysema, unspecified emphysema type (H)       vitamin D 2000 units tablet     60 tablet    Take 2,000 Units by mouth daily    Vitamin D deficiency disease       * Notice:  This list has 2 medication(s) that are the same as other medications prescribed for you. Read the directions  carefully, and ask your doctor or other care provider to review them with you.

## 2018-07-26 NOTE — MR AVS SNAPSHOT
After Visit Summary   7/26/2018    Kim Anne    MRN: [id number]           Patient Information     Date Of Birth          1945        Visit Information        Provider Department      7/26/2018 11:00 AM Carmen Elder [facility]        Today's Diagnoses     Type 2 diabetes mellitus without complication, with long-term current use of insulin (H)    -  1    Essential hypertension        Cigarette nicotine dependence without complication        Chronic obstructive pulmonary disease, unspecified COPD type (H)          Care Instructions    See AVS from PCP encounter for details           Follow-ups after your visit        Your next 10 appointments already scheduled     Aug 03, 2018  8:45 AM CDT   Lab with  LAB   [facility] ([facility])    [address]  1st Floor  [facility] [id number] [phone]            Aug 03, 2018  9:40 AM CDT   (Arrive by 9:10 AM)   Return Visit with Bhargav Lopes MD   [facility] ([facility])    [address]  [address]  [facility] [id number] [phone]            Aug 16, 2018 11:00 AM CDT   SHORT with Carmen Elder [facility] ([facility])    [address] [place] [address]  [address] [place] [id number] [phone]            Aug 30, 2018 10:30 AM CDT   Return Visit with JUNIOR Bishop CNP   [facility] ([facility])    [address] [place], [place] [address]  [address]  [facility] [id number] [phone]            Aug 30, 2018 10:30 AM CDT   Return Visit with LAMINE Chahal   [facility] ([facility])    [address]  [address] [place] [id number] [phone]              Who to contact     If you have questions or need  "follow up information about today's clinic visit or your schedule please contact [facility] directly at [phone].  Normal or non-critical lab and imaging results will be communicated to you by MyChart, letter or phone within 4 business days after the clinic has received the results. If you do not hear from us within 7 days, please contact the clinic through [employer] or phone. If you have a critical or abnormal lab result, we will notify you by phone as soon as possible.  Submit refill requests through [employer] or call your pharmacy and they will forward the refill request to us. Please allow 3 business days for your refill to be completed.          Additional Information About Your Visit        [employer] Information     [employer] lets you send messages to your doctor, view your test results, renew your prescriptions, schedule appointments and more. To sign up, go to [email or web address] . Click on \"Log in\" on the left side of the screen, which will take you to the Welcome page. Then click on \"Sign up Now\" on the right side of the page.     You will be asked to enter the access code listed below, as well as some personal information. Please follow the directions to create your username and password.     Your access code is: [email or web address]  Expires: 2018  6:30 AM     Your access code will  in 90 days. If you need help or a new code, please call your [facility] clinic or [phone].        Care EveryWhere ID     This is your Care EveryWhere ID. This could be used by other organizations to access your [facility] medical records  [id number]         Blood Pressure from Last 3 Encounters:   18 138/68   18 148/62   18 183/66    Weight from Last 3 Encounters:   18 167 lb 8 oz (76 kg)   18 168 lb (76.2 kg)   18 167 lb (75.8 kg)              Today, you had the following     No orders found for display         Today's Medication Changes        "   These changes are accurate as of 7/26/18 11:59 PM.  If you have any questions, ask your nurse or doctor.               These medicines have changed or have updated prescriptions.        Dose/Directions    insulin glargine 100 UNIT/ML injection   Commonly known as:  LANTUS   This may have changed:  how much to take   Used for:  Controlled type 2 diabetes mellitus with stage 4 chronic kidney disease, with long-term current use of insulin (H)   Changed by:  Ailyn Nieves APRN CNP        Dose:  8 Units   Inject 8 Units Subcutaneous every morning   Quantity:  15 mL   Refills:  3            Where to get your medicines      These medications were sent to [facility] [place], [place] - [address]  [address] [facility] [address]     Phone:  [phone]     insulin glargine 100 UNIT/ML injection                Primary Care Provider Office Phone # Fax #    JUNIOR Bishop [phone]       [address] [facility] [address]  [facility] [address]        Equal Access to Services     DIMPLE TOLLIVER AH: Hadii aad ku hadasho Soomaali, waaxda luqadaha, qaybta kaalmada adeegyada, waxay idiin hayanastasiia min kharaanna white . So [facility] [phone].    ATENCIÓN: Si habla español, tiene a bailey disposición servicios gratuitos de asistencia lingüística. [facility] [phone].    We comply with applicable federal civil rights laws and [place] laws. We do not discriminate on the basis of race, color, national origin, age, disability, sex, sexual orientation, or gender identity.            Thank you!     Thank you for choosing [facility]  for your care. Our goal is always to provide you with excellent care. Hearing back from our patients is one way we can continue to improve our services. Please take a few minutes to complete the written survey that you may receive in the mail after your visit with us. Thank you!             Your Updated Medication List -  Protect others around you: Learn how to safely use, store and throw away your medicines at www.disposemymeds.org.          This list is accurate as of 7/26/18 11:59 PM.  Always use your most recent med list.                   Brand Name Dispense Instructions for use Diagnosis    albuterol 108 (90 Base) MCG/ACT Inhaler    PROAIR HFA/PROVENTIL HFA/VENTOLIN HFA    18 g    Inhale 2 puffs into the lungs every 6 hours as needed for shortness of breath / dyspnea or wheezing    Chronic obstructive pulmonary disease, unspecified COPD type (H)       amLODIPine 5 MG tablet    NORVASC    60 tablet    Take 2 tablets (10 mg) by mouth daily    Renovascular hypertension       ASPIR-LOW 81 MG EC tablet   Generic drug:  aspirin     120 tablet    TAKE 1 TABLET BY MOUTH EVERY DAY    History of MI (myocardial infarction), Essential hypertension, benign       atorvastatin 40 MG tablet    LIPITOR    90 tablet    Take 1 tablet (40 mg) by mouth daily    Hyperlipidemia LDL goal <100       blood glucose monitoring lancets     100 each    Test BS two times daily as directed    Type 2 diabetes mellitus without complication, with long-term current use of insulin (H)       blood glucose monitoring meter device kit    no brand specified    1 kit    Use to test blood sugar 2 times daily or as directed. Preferred blood glucose meter OR supplies to accompany: Blood Glucose Monitor Brands: per insurance.    Type 2 diabetes mellitus without complication, with long-term current use of insulin (H)       blood glucose monitoring test strip    FREESTYLE LITE    50 strip    TEST BLOOD SUGAR TWO TIMES A DAY    Type 2 diabetes mellitus without complication, with long-term current use of insulin (H)       Blood Pressure Monitor Kit     1 kit    Automatic Blood Pressure Monitor    Renovascular hypertension       docusate sodium 100 MG capsule    COLACE    60 capsule    Take 1 capsule (100 mg) by mouth 2 times daily    Constipation, unspecified constipation type        EUCERIN CALMING DAILY MOIST Crea     1 Tube    Externally apply 1 dose * topically daily    Type II or unspecified type diabetes mellitus with neurological manifestations, not stated as uncontrolled(250.60) (H)       ferrous sulfate 325 (65 Fe) MG tablet    IRON    100 tablet    Take 1 tablet (325 mg) by mouth daily (with breakfast)    Iron deficiency anemia, unspecified iron deficiency anemia type       fluticasone 50 MCG/ACT spray    FLONASE    1 Bottle    Spray 1-2 sprays into both nostrils daily    Acute nasopharyngitis       furosemide 40 MG tablet    LASIX    90 tablet    Take 1.5 tablets (60 mg) by mouth daily    Hyperkalemia, Edema, unspecified type       hydrALAZINE 25 MG tablet    APRESOLINE    60 tablet    Take 1 tablet (25 mg) by mouth 2 times daily    Essential hypertension, Chronic kidney disease, stage 4 (severe) (H)       insulin glargine 100 UNIT/ML injection    LANTUS    15 mL    Inject 8 Units Subcutaneous every morning    Controlled type 2 diabetes mellitus with stage 4 chronic kidney disease, with long-term current use of insulin (H)       insulin pen needle 31G X 6 MM     120 each    Use as directed    Type 2 diabetes mellitus without complication, with long-term current use of insulin (H)       levothyroxine 200 MCG tablet    SYNTHROID/LEVOTHROID    90 tablet    Take 1 tablet (200 mcg) by mouth daily    Other specified hypothyroidism       metoprolol succinate 25 MG 24 hr tablet    TOPROL-XL    90 tablet    Take 1 tablet (25 mg) by mouth daily    Hypertension associated with diabetes (H)       mometasone-formoterol 100-5 MCG/ACT oral inhaler    DULERA    1 Inhaler    Inhale 2 puffs into the lungs 2 times daily    COPD (chronic obstructive pulmonary disease) (H)       nicotine polacrilex 2 MG lozenge    COMMIT    100 lozenge    Dissolve 1 lozenge orally every 4 hours as directed.    Chronic obstructive pulmonary disease, unspecified COPD type (H)       nitroGLYcerin 0.4 MG sublingual  tablet    NITROSTAT    25 tablet    Place 1 tablet (0.4 mg) under the tongue every 5 minutes as needed for chest pain    History of MI (myocardial infarction)       nystatin 568117 UNIT/GM Powd    MYCOSTATIN    30 g    Apply 1 g topically 3 times daily as needed    Groin rash       * order for DME     1 Device    One wheeled walker with seat and brakes and basket    Risk for falls       * order for DME     2 each    Equipment being ordered: Compression socks. Strength:15-20 mmHg    Localized edema       order for DME     1 each    Equipment being ordered: Diabetic Shoes    Type 2 diabetes mellitus with stage 5 chronic kidney disease not on chronic dialysis, without long-term current use of insulin (H)       order for DME     2 Device    Equipment being ordered: two pairs moderate knee high support hose    Edema, unspecified type       oxyCODONE IR 10 MG tablet    ROXICODONE    90 tablet    Take 1 tablet (10 mg) by mouth every 8 hours as needed    Chronic low back pain without sciatica, unspecified back pain laterality       polyethylene glycol powder    MIRALAX    510 g    Take 17 g (1 capful) by mouth daily as needed for constipation    Slow transit constipation       SM ALCOHOL PREP 70 % Pads     100 each    Externally apply 1 pad topically 4 times daily    Type 2 diabetes mellitus without complication, with long-term current use of insulin (H)       sodium bicarbonate 325 MG tablet     360 tablet    Take 2 tablets (650 mg) by mouth 2 times daily    Acidosis, CKD (chronic kidney disease) stage 4, GFR 15-29 ml/min (H)       tiotropium 18 MCG capsule    SPIRIVA HANDIHALER    90 capsule    Inhale contents of one capsule daily.    Pulmonary emphysema, unspecified emphysema type (H)       vitamin D 2000 units tablet     60 tablet    Take 2,000 Units by mouth daily    Vitamin D deficiency disease       * Notice:  This list has 2 medication(s) that are the same as other medications prescribed for you. Read the directions  carefully, and ask your doctor or other care provider to review them with you.

## 2018-07-26 NOTE — MR AVS SNAPSHOT
After Visit Summary   7/26/2018    Kim Anne    MRN: [id number]           Patient Information     Date Of Birth          1945        Visit Information        Provider Department      7/26/2018 11:00 AM Ailyn Nieves APRN [facility]        Today's Diagnoses     Screening for diabetic retinopathy    -  1    Hypertension goal BP (blood pressure) < 130/80        Type 2 diabetes mellitus with stage 5 chronic kidney disease not on chronic dialysis, with long-term current use of insulin (H)        Acquired hypothyroidism        Controlled type 2 diabetes mellitus with stage 4 chronic kidney disease, with long-term current use of insulin (H)          Care Instructions    -Make sure you have Ventolin or ProAir to use as needed for shortness of breath.   -Bring your Blood Pressure machine and all your pill bottles at the next visit.   -Decrease Lantus to 8 units per day.           Follow-ups after your visit        Additional Services     OPHTHALMOLOGY ADULT REFERRAL       Your provider has referred you to: [facility] [place] [phone]   [email or web address]    Please be aware that coverage of these services is subject to the terms and limitations of your health insurance plan.  Call member services at your health plan with any benefit or coverage questions.      Please bring the following with you to your appointment:    (1) Any X-Rays, CTs or MRIs which have been performed.  Contact the facility where they were done to arrange for  prior to your scheduled appointment.    (2) List of current medications  (3) This referral request   (4) Any documents/labs given to you for this referral                  Your next 10 appointments already scheduled     Aug 03, 2018  8:45 AM CDT   Lab with  LAB    Health Lab ([facility])    [address] [place] [id number]  "  [phone]            Aug 03, 2018  9:40 AM CDT   (Arrive by 9:10 AM)   Return Visit with Bhargav Lopes MD   [facility] ([facility])    [address]  [address]  [facility] [address] [phone]              Who to contact     If you have questions or need follow up information about today's clinic visit or your schedule please contact [facility] directly at [phone].  Normal or non-critical lab and imaging results will be communicated to you by MyChart, letter or phone within 4 business days after the clinic has received the results. If you do not hear from us within 7 days, please contact the clinic through [employer] or phone. If you have a critical or abnormal lab result, we will notify you by phone as soon as possible.  Submit refill requests through [employer] or call your pharmacy and they will forward the refill request to us. Please allow 3 business days for your refill to be completed.          Additional Information About Your Visit        [employer] Information     Viewdle lets you send messages to your doctor, view your test results, renew your prescriptions, schedule appointments and more. To sign up, go to [email or web address] . Click on \"Log in\" on the left side of the screen, which will take you to the Welcome page. Then click on \"Sign up Now\" on the right side of the page.     You will be asked to enter the access code listed below, as well as some personal information. Please follow the directions to create your username and password.     Your access code is: [email or web address]  Expires: 2018  6:30 AM     Your access code will  in 90 days. If you need help or a new code, please call your [facility] clinic or [phone].        Care EveryWhere ID     This is your Care EveryWhere ID. This could be used by other organizations to access your [facility] medical records  [id number]        Your Vitals Were     Pulse " Temperature Respirations Pulse Oximetry BMI (Body Mass Index)       78 98.4  F (36.9  C) (Oral) 16 98% 27.04 kg/m2        Blood Pressure from Last 3 Encounters:   07/26/18 138/68   06/25/18 148/62   05/16/18 183/66    Weight from Last 3 Encounters:   07/26/18 167 lb 8 oz (76 kg)   06/25/18 168 lb (76.2 kg)   05/16/18 167 lb (75.8 kg)              We Performed the Following     Albumin Random Urine Quantitative with Creat Ratio     Hemoglobin A1c     OPHTHALMOLOGY ADULT REFERRAL     TSH with free T4 reflex          Today's Medication Changes          These changes are accurate as of 7/26/18 11:13 AM.  If you have any questions, ask your nurse or doctor.               These medicines have changed or have updated prescriptions.        Dose/Directions    insulin glargine 100 UNIT/ML injection   Commonly known as:  LANTUS   This may have changed:  how much to take   Used for:  Controlled type 2 diabetes mellitus with stage 4 chronic kidney disease, with long-term current use of insulin (H)   Changed by:  Ailyn Nieves APRN CNP        Dose:  8 Units   Inject 8 Units Subcutaneous every morning   Quantity:  15 mL   Refills:  3            Where to get your medicines      These medications were sent to [facility] [place], [place] - [address] [facility]  [address] [facility] [address]     Phone:  [phone]     insulin glargine 100 UNIT/ML injection                Primary Care Provider Office Phone # Fax #    JUNIOR Bishop [phone]       [address] [facility] [address]  [facility] [address]        Equal Access to Services     [facility] CenterEILEEN : Hadii alysha redd hadasho Soomaali, waaxda luqadaha, qaybta kaalmada adeegteresa, kassandra idiin hayaan adeeg kharash la'aan . So [facility] [phone].    ATENCIÓN: Si habla español, tiene a bailey disposición servicios gratuitos de asistencia lingüística. Llame al [phone].    We comply with applicable federal civil rights laws and [place] laws.  We do not discriminate on the basis of race, color, national origin, age, disability, sex, sexual orientation, or gender identity.            Thank you!     Thank you for choosing [facility]  for your care. Our goal is always to provide you with excellent care. Hearing back from our patients is one way we can continue to improve our services. Please take a few minutes to complete the written survey that you may receive in the mail after your visit with us. Thank you!             Your Updated Medication List - Protect others around you: Learn how to safely use, store and throw away your medicines at www.disposemymeds.org.          This list is accurate as of 7/26/18 11:13 AM.  Always use your most recent med list.                   Brand Name Dispense Instructions for use Diagnosis    albuterol 108 (90 Base) MCG/ACT Inhaler    PROAIR HFA/PROVENTIL HFA/VENTOLIN HFA    18 g    Inhale 2 puffs into the lungs every 6 hours as needed for shortness of breath / dyspnea or wheezing    Chronic obstructive pulmonary disease, unspecified COPD type (H)       amLODIPine 5 MG tablet    NORVASC    60 tablet    Take 2 tablets (10 mg) by mouth daily    Renovascular hypertension       ASPIR-LOW 81 MG EC tablet   Generic drug:  aspirin     120 tablet    TAKE 1 TABLET BY MOUTH EVERY DAY    History of MI (myocardial infarction), Essential hypertension, benign       atorvastatin 40 MG tablet    LIPITOR    90 tablet    Take 1 tablet (40 mg) by mouth daily    Hyperlipidemia LDL goal <100       blood glucose monitoring lancets     100 each    Test BS two times daily as directed    Type 2 diabetes mellitus without complication, with long-term current use of insulin (H)       blood glucose monitoring meter device kit    no brand specified    1 kit    Use to test blood sugar 2 times daily or as directed. Preferred blood glucose meter OR supplies to accompany: Blood Glucose Monitor Brands: per insurance.    Type 2  diabetes mellitus without complication, with long-term current use of insulin (H)       blood glucose monitoring test strip    FREESTYLE LITE    50 strip    TEST BLOOD SUGAR TWO TIMES A DAY    Type 2 diabetes mellitus without complication, with long-term current use of insulin (H)       Blood Pressure Monitor Kit     1 kit    Automatic Blood Pressure Monitor    Renovascular hypertension       docusate sodium 100 MG capsule    COLACE    60 capsule    Take 1 capsule (100 mg) by mouth 2 times daily    Constipation, unspecified constipation type       EUCERIN CALMING DAILY MOIST Crea     1 Tube    Externally apply 1 dose * topically daily    Type II or unspecified type diabetes mellitus with neurological manifestations, not stated as uncontrolled(250.60) (H)       ferrous sulfate 325 (65 Fe) MG tablet    IRON    100 tablet    Take 1 tablet (325 mg) by mouth daily (with breakfast)    Iron deficiency anemia, unspecified iron deficiency anemia type       fluticasone 50 MCG/ACT spray    FLONASE    1 Bottle    Spray 1-2 sprays into both nostrils daily    Acute nasopharyngitis       furosemide 40 MG tablet    LASIX    90 tablet    Take 1.5 tablets (60 mg) by mouth daily    Hyperkalemia, Edema, unspecified type       hydrALAZINE 25 MG tablet    APRESOLINE    60 tablet    Take 1 tablet (25 mg) by mouth 2 times daily    Essential hypertension, Chronic kidney disease, stage 4 (severe) (H)       insulin glargine 100 UNIT/ML injection    LANTUS    15 mL    Inject 8 Units Subcutaneous every morning    Controlled type 2 diabetes mellitus with stage 4 chronic kidney disease, with long-term current use of insulin (H)       insulin pen needle 31G X 6 MM     120 each    Use as directed    Type 2 diabetes mellitus without complication, with long-term current use of insulin (H)       levothyroxine 200 MCG tablet    SYNTHROID/LEVOTHROID    90 tablet    Take 1 tablet (200 mcg) by mouth daily    Other specified hypothyroidism       metoprolol  succinate 25 MG 24 hr tablet    TOPROL-XL    90 tablet    Take 1 tablet (25 mg) by mouth daily    Hypertension associated with diabetes (H)       mometasone-formoterol 100-5 MCG/ACT oral inhaler    DULERA    1 Inhaler    Inhale 2 puffs into the lungs 2 times daily    COPD (chronic obstructive pulmonary disease) (H)       nicotine polacrilex 2 MG lozenge    COMMIT    100 lozenge    Dissolve 1 lozenge orally every 4 hours as directed.    Chronic obstructive pulmonary disease, unspecified COPD type (H)       nitroGLYcerin 0.4 MG sublingual tablet    NITROSTAT    25 tablet    Place 1 tablet (0.4 mg) under the tongue every 5 minutes as needed for chest pain    History of MI (myocardial infarction)       nystatin 200280 UNIT/GM Powd    MYCOSTATIN    30 g    Apply 1 g topically 3 times daily as needed    Groin rash       * order for DME     1 Device    One wheeled walker with seat and brakes and basket    Risk for falls       * order for DME     2 each    Equipment being ordered: Compression socks. Strength:15-20 mmHg    Localized edema       order for DME     1 each    Equipment being ordered: Diabetic Shoes    Type 2 diabetes mellitus with stage 5 chronic kidney disease not on chronic dialysis, without long-term current use of insulin (H)       order for DME     2 Device    Equipment being ordered: two pairs moderate knee high support hose    Edema, unspecified type       oxyCODONE IR 10 MG tablet    ROXICODONE    90 tablet    Take 1 tablet (10 mg) by mouth every 8 hours as needed    Chronic low back pain without sciatica, unspecified back pain laterality       polyethylene glycol powder    MIRALAX    510 g    Take 17 g (1 capful) by mouth daily as needed for constipation    Slow transit constipation       SM ALCOHOL PREP 70 % Pads     100 each    Externally apply 1 pad topically 4 times daily    Type 2 diabetes mellitus without complication, with long-term current use of insulin (H)       sodium bicarbonate 325 MG  tablet     360 tablet    Take 2 tablets (650 mg) by mouth 2 times daily    Acidosis, CKD (chronic kidney disease) stage 4, GFR 15-29 ml/min (H)       tiotropium 18 MCG capsule    SPIRIVA HANDIHALER    90 capsule    Inhale contents of one capsule daily.    Pulmonary emphysema, unspecified emphysema type (H)       vitamin D 2000 units tablet     60 tablet    Take 2,000 Units by mouth daily    Vitamin D deficiency disease       * Notice:  This list has 2 medication(s) that are the same as other medications prescribed for you. Read the directions carefully, and ask your doctor or other care provider to review them with you.

## 2018-07-26 NOTE — PATIENT INSTRUCTIONS
-Make sure you have Ventolin or ProAir to use as needed for shortness of breath.   -Bring your Blood Pressure machine and all your pill bottles at the next visit.   -Decrease Lantus to 8 units per day.

## 2018-07-30 ENCOUNTER — TELEPHONE (OUTPATIENT)
Dept: FAMILY MEDICINE | Facility: CLINIC | Age: 73
End: 2018-07-30

## 2018-07-30 DIAGNOSIS — G89.29 CHRONIC LOW BACK PAIN WITHOUT SCIATICA, UNSPECIFIED BACK PAIN LATERALITY: ICD-10-CM

## 2018-07-30 DIAGNOSIS — M54.50 CHRONIC LOW BACK PAIN WITHOUT SCIATICA, UNSPECIFIED BACK PAIN LATERALITY: ICD-10-CM

## 2018-07-30 NOTE — TELEPHONE ENCOUNTER
oxyCODONE IR (ROXICODONE) 10 MG tablet  Requested Prescriptions  Last Written Prescription Date:  7/2/18  Last Fill Quantity: 90,  # refills: 0   Last office visit: 7/26/2018 with prescribing provider:     Future Office Visit:   Next 5 appointments (look out 90 days)     Aug 16, 2018 11:00 AM CDT   SHORT with Carmen Elder RPH   [facility] ([facility])    [address] [place] [address]  [address] [place] [address]   [phone]            Aug 30, 2018 10:30 AM CDT   Return Visit with JUNIOR Bishop CNP   [facility] ([facility])    [address]  [address] [place] [address]   [phone]            Aug 30, 2018 10:30 AM CDT   Return Visit with LAMINE Chahal   [facility] ([facility])    [address] [facility]  [address] [place] [address]   [phone]                    Pending Prescriptions Disp Refills     oxyCODONE IR (ROXICODONE) 10 MG tablet 90 tablet 0     Sig: Take 1 tablet (10 mg) by mouth every 8 hours as needed    There is no refill protocol information for this order

## 2018-07-31 ENCOUNTER — PATIENT OUTREACH (OUTPATIENT)
Dept: GERIATRIC MEDICINE | Facility: CLINIC | Age: 73
End: 2018-07-31

## 2018-07-31 NOTE — PROGRESS NOTES
[facility] Care Coordination Contact  Arranged transportation thru [facility] for the below appt:  Appt Date & Time: 8/03 @ 8:45 am  Clinic Name & Address:  [facility] [address] [address]  Transportation Provider: [employer]   time:  7:45-8:15 am    Notified Kim of  time.    Brianna Barrios  Case Management Specialist  [facility]   [phone]

## 2018-08-01 ENCOUNTER — PATIENT OUTREACH (OUTPATIENT)
Dept: GERIATRIC MEDICINE | Facility: CLINIC | Age: 73
End: 2018-08-01

## 2018-08-01 DIAGNOSIS — N18.4 CKD (CHRONIC KIDNEY DISEASE) STAGE 4, GFR 15-29 ML/MIN (H): Primary | ICD-10-CM

## 2018-08-01 NOTE — TELEPHONE ENCOUNTER
Problem List Complete:  Yes    Clinic visit frequency required: unspecified       Controlled substance agreement on file: Yes:  Date 4/18/18.     Processing:  to be picked up or mailed   checked in past 3 months?  [place] PM reviewed 8/1/2018 9:09 AM - last refill 7/2/18 - no concerns    Thank you,  Jv Newberry RN

## 2018-08-01 NOTE — PROGRESS NOTES
[facility] Care Coordination Contact  Call placed to member to schedule annual home visit. No answer, unable to leave voicemail.  Batool Mai RN, BSN, PHN  [facility]  [phone]  Fax: [phone]

## 2018-08-02 RX ORDER — OXYCODONE HYDROCHLORIDE 10 MG/1
10 TABLET ORAL EVERY 8 HOURS PRN
Qty: 90 TABLET | Refills: 0 | Status: SHIPPED | OUTPATIENT
Start: 2018-08-02 | End: 2018-08-03

## 2018-08-02 NOTE — TELEPHONE ENCOUNTER
Pt called checking on the status for Oxycodone Rx. Please call pt when Rx is ready for .    Pt contact info: [phone]      Bucky Weller

## 2018-08-03 ENCOUNTER — OFFICE VISIT (OUTPATIENT)
Dept: NEPHROLOGY | Facility: CLINIC | Age: 73
End: 2018-08-03
Attending: INTERNAL MEDICINE
Payer: COMMERCIAL

## 2018-08-03 VITALS
DIASTOLIC BLOOD PRESSURE: 77 MMHG | BODY MASS INDEX: 25.88 KG/M2 | WEIGHT: 161 LBS | HEART RATE: 67 BPM | OXYGEN SATURATION: 99 % | SYSTOLIC BLOOD PRESSURE: 183 MMHG | HEIGHT: 66 IN

## 2018-08-03 DIAGNOSIS — I10 HYPERTENSION GOAL BP (BLOOD PRESSURE) < 140/90: ICD-10-CM

## 2018-08-03 DIAGNOSIS — N18.4 CKD (CHRONIC KIDNEY DISEASE) STAGE 4, GFR 15-29 ML/MIN (H): ICD-10-CM

## 2018-08-03 DIAGNOSIS — I10 ESSENTIAL HYPERTENSION: ICD-10-CM

## 2018-08-03 DIAGNOSIS — E11.22 TYPE 2 DIABETES MELLITUS WITH CHRONIC KIDNEY DISEASE, WITHOUT LONG-TERM CURRENT USE OF INSULIN, UNSPECIFIED CKD STAGE (H): Primary | ICD-10-CM

## 2018-08-03 DIAGNOSIS — N18.4 CHRONIC KIDNEY DISEASE, STAGE 4 (SEVERE) (H): ICD-10-CM

## 2018-08-03 LAB
ALBUMIN SERPL-MCNC: 1.9 G/DL (ref 3.4–5)
ANION GAP SERPL CALCULATED.3IONS-SCNC: 9 MMOL/L (ref 3–14)
BUN SERPL-MCNC: 37 MG/DL (ref 7–30)
CALCIUM SERPL-MCNC: 7.7 MG/DL (ref 8.5–10.1)
CHLORIDE SERPL-SCNC: 116 MMOL/L (ref 94–109)
CO2 SERPL-SCNC: 19 MMOL/L (ref 20–32)
CREAT SERPL-MCNC: 3.88 MG/DL (ref 0.52–1.04)
DEPRECATED CALCIDIOL+CALCIFEROL SERPL-MC: 11 UG/L (ref 20–75)
ERYTHROCYTE [DISTWIDTH] IN BLOOD BY AUTOMATED COUNT: 13.9 % (ref 10–15)
GFR SERPL CREATININE-BSD FRML MDRD: 11 ML/MIN/1.7M2
GLUCOSE SERPL-MCNC: 94 MG/DL (ref 70–99)
HCT VFR BLD AUTO: 39.1 % (ref 35–47)
HGB BLD-MCNC: 11.9 G/DL (ref 11.7–15.7)
MCH RBC QN AUTO: 28.3 PG (ref 26.5–33)
MCHC RBC AUTO-ENTMCNC: 30.4 G/DL (ref 31.5–36.5)
MCV RBC AUTO: 93 FL (ref 78–100)
PHOSPHATE SERPL-MCNC: 4.7 MG/DL (ref 2.5–4.5)
PLATELET # BLD AUTO: 305 10E9/L (ref 150–450)
POTASSIUM SERPL-SCNC: 4.9 MMOL/L (ref 3.4–5.3)
PTH-INTACT SERPL-MCNC: 486 PG/ML (ref 18–80)
RBC # BLD AUTO: 4.2 10E12/L (ref 3.8–5.2)
SODIUM SERPL-SCNC: 144 MMOL/L (ref 133–144)
WBC # BLD AUTO: 7.7 10E9/L (ref 4–11)

## 2018-08-03 PROCEDURE — 82306 VITAMIN D 25 HYDROXY: CPT | Performed by: INTERNAL MEDICINE

## 2018-08-03 PROCEDURE — 85027 COMPLETE CBC AUTOMATED: CPT | Performed by: INTERNAL MEDICINE

## 2018-08-03 PROCEDURE — 83970 ASSAY OF PARATHORMONE: CPT | Performed by: INTERNAL MEDICINE

## 2018-08-03 PROCEDURE — 80069 RENAL FUNCTION PANEL: CPT | Performed by: INTERNAL MEDICINE

## 2018-08-03 PROCEDURE — 36415 COLL VENOUS BLD VENIPUNCTURE: CPT | Performed by: INTERNAL MEDICINE

## 2018-08-03 PROCEDURE — G0463 HOSPITAL OUTPT CLINIC VISIT: HCPCS | Mod: ZF

## 2018-08-03 RX ORDER — OXYCODONE HYDROCHLORIDE 10 MG/1
10 TABLET ORAL EVERY 8 HOURS PRN
Qty: 90 TABLET | Refills: 0 | Status: SHIPPED | OUTPATIENT
Start: 2018-08-03 | End: 2018-08-30

## 2018-08-03 RX ORDER — HYDRALAZINE HYDROCHLORIDE 50 MG/1
50 TABLET, FILM COATED ORAL 2 TIMES DAILY
Qty: 60 TABLET | Refills: 11 | Status: SHIPPED | OUTPATIENT
Start: 2018-08-03 | End: 2018-08-16

## 2018-08-03 ASSESSMENT — PAIN SCALES - GENERAL: PAINLEVEL: MILD PAIN (3)
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
## 2018-08-03 NOTE — MR AVS SNAPSHOT
After Visit Summary   8/3/2018    Kim Anne    MRN: [id number]           Patient Information     Date Of Birth          1945        Visit Information        Provider Department      8/3/2018 9:40 AM Bhargav Lopes MD [facility] Nephrology        Today's Diagnoses     Type 2 diabetes mellitus with chronic kidney disease, without long-term current use of insulin, unspecified CKD stage (H)    -  1    CKD (chronic kidney disease) stage 4, GFR 15-29 ml/min (H)        Hypertension goal BP (blood pressure) < 140/90        Essential hypertension        Chronic kidney disease, stage 4 (severe) (H)          Care Instructions    Call Carlota Beckwith, Transplant Coordinator to schedule remaining part of your transplant w/u. Her telephone number is [phone].            Follow-ups after your visit        Follow-up notes from your care team     Return in about 3 months (around 11/3/2018).      Your next 10 appointments already scheduled     Aug 13, 2018 10:15 AM CDT   RETURN GENERAL with Cyn Gabriel MD   Eye Clinic ([facility])    [address]  9[facility] [address] [place] [id number]   [phone]            Aug 16, 2018 11:00 AM CDT   SHORT with Carmen Elder [facility] [place] ([facility])    [address] [place] [id number] [phone]            Aug 30, 2018 10:30 AM CDT   Return Visit with JUNIOR Bishop CNP   [facility] ([facility])    [address] [place] [address]  [address] [place] [id number] [phone]            Aug 30, 2018 10:30 AM CDT   Return Visit with LAMINE Chahal   [facility] ([facility])    [address] [place] [address]  [address] [place] [id number] [phone]            Nov 16, 2018  8:45 AM CST   Lab  "with  LAB   [facility] ([facility])    [address]  1st Floor  [facility] [address] [phone]            2018  9:40 AM CST   (Arrive by 9:10 AM)   Return Visit with Bhargav Lopes MD   [facility] ([facility])    [address]  [address]  [facility] [address] [phone]              Who to contact     If you have questions or need follow up information about today's clinic visit or your schedule please contact [facility] NEPHROLOG[facility] directly at [phone].  Normal or non-critical lab and imaging results will be communicated to you by MyChart, letter or phone within 4 business days after the clinic has received the results. If you do not hear from us within 7 days, please contact the clinic through [employer] or phone. If you have a critical or abnormal lab result, we will notify you by phone as soon as possible.  Submit refill requests through flipClass or call your pharmacy and they will forward the refill request to us. Please allow 3 business days for your refill to be completed.          Additional Information About Your Visit        flipClass Information     flipClass lets you send messages to your doctor, view your test results, renew your prescriptions, schedule appointments and more. To sign up, go to [email or web address] . Click on \"Log in\" on the left side of the screen, which will take you to the Welcome page. Then click on \"Sign up Now\" on the right side of the page.     You will be asked to enter the access code listed below, as well as some personal information. Please follow the directions to create your username and password.     Your access code is: [email or web address]  Expires: 2018  6:30 AM     Your access code will  in 90 days. If you need help or a new code, please call your [place] clinic or [phone].        Care EveryWhere ID     This is your Care EveryWhere ID. This could be used by " "other organizations to access your [facility] medical records  [id number]        Your Vitals Were     Pulse Height Pulse Oximetry BMI (Body Mass Index)          67 1.676 m (5' 6\") 99% 25.99 kg/m2         Blood Pressure from Last 3 Encounters:   08/03/18 183/77   07/26/18 138/68   06/25/18 148/62    Weight from Last 3 Encounters:   08/03/18 73 kg (161 lb)   07/26/18 76 kg (167 lb 8 oz)   06/25/18 76.2 kg (168 lb)              Today, you had the following     No orders found for display         Today's Medication Changes          These changes are accurate as of 8/3/18 10:19 AM.  If you have any questions, ask your nurse or doctor.               These medicines have changed or have updated prescriptions.        Dose/Directions    hydrALAZINE 50 MG tablet   Commonly known as:  APRESOLINE   This may have changed:    - medication strength  - how much to take   Used for:  Essential hypertension, Chronic kidney disease, stage 4 (severe) (H)   Changed by:  Bhargav Lopes MD        Dose:  50 mg   Take 1 tablet (50 mg) by mouth 2 times daily   Quantity:  60 tablet   Refills:  11            Where to get your medicines      These medications were sent to [facility] [address]  [address] [facility] [address]     Phone:  [phone]     hydrALAZINE 50 MG tablet                Primary Care Provider Office Phone # Fax #    Ailyn JUNIOR Pickett [phone]       [address] [facility] [address]  [facility] [address]        Equal Access to Services     [facility] : Hadii alysha downingo Soluiz, waaxda luqadaha, qaybta kaalmada adeegyada, waxay idiin hayaan adeeg kharash la'aan . So [facility] [phone].    ATENCIÓN: Si habla español, tiene a bailey disposición servicios gratuitos de asistencia lingüística. Llame al [phone].    We comply with applicable federal civil rights laws and [place] laws. We do not discriminate on the basis of race, color, national origin, age, " disability, sex, sexual orientation, or gender identity.            Thank you!     Thank you for choosing [facility]PHR[facility]  for your care. Our goal is always to provide you with excellent care. Hearing back from our patients is one way we can continue to improve our services. Please take a few minutes to complete the written survey that you may receive in the mail after your visit with us. Thank you!             Your Updated Medication List - Protect others around you: Learn how to safely use, store and throw away your medicines at www.disposemymeds.org.          This list is accurate as of 8/3/18 10:19 AM.  Always use your most recent med list.                   Brand Name Dispense Instructions for use Diagnosis    albuterol 108 (90 Base) MCG/ACT Inhaler    PROAIR HFA/PROVENTIL HFA/VENTOLIN HFA    18 g    Inhale 2 puffs into the lungs every 6 hours as needed for shortness of breath / dyspnea or wheezing    Chronic obstructive pulmonary disease, unspecified COPD type (H)       amLODIPine 5 MG tablet    NORVASC    60 tablet    Take 2 tablets (10 mg) by mouth daily    Renovascular hypertension       ASPIR-LOW 81 MG EC tablet   Generic drug:  aspirin     120 tablet    TAKE 1 TABLET BY MOUTH EVERY DAY    History of MI (myocardial infarction), Essential hypertension, benign       atorvastatin 40 MG tablet    LIPITOR    90 tablet    Take 1 tablet (40 mg) by mouth daily    Hyperlipidemia LDL goal <100       blood glucose monitoring lancets     100 each    Test BS two times daily as directed    Type 2 diabetes mellitus without complication, with long-term current use of insulin (H)       blood glucose monitoring meter device kit    no brand specified    1 kit    Use to test blood sugar 2 times daily or as directed. Preferred blood glucose meter OR supplies to accompany: Blood Glucose Monitor Brands: per insurance.    Type 2 diabetes mellitus without complication, with long-term current use of insulin (H)       blood  glucose monitoring test strip    FREESTYLE LITE    50 strip    TEST BLOOD SUGAR TWO TIMES A DAY    Type 2 diabetes mellitus without complication, with long-term current use of insulin (H)       Blood Pressure Monitor Kit     1 kit    Automatic Blood Pressure Monitor    Renovascular hypertension       docusate sodium 100 MG capsule    COLACE    60 capsule    Take 1 capsule (100 mg) by mouth 2 times daily    Constipation, unspecified constipation type       EUCERIN CALMING DAILY MOIST Crea     1 Tube    Externally apply 1 dose * topically daily    Type II or unspecified type diabetes mellitus with neurological manifestations, not stated as uncontrolled(250.60) (H)       ferrous sulfate 325 (65 Fe) MG tablet    IRON    100 tablet    Take 1 tablet (325 mg) by mouth daily (with breakfast)    Iron deficiency anemia, unspecified iron deficiency anemia type       fluticasone 50 MCG/ACT spray    FLONASE    1 Bottle    Spray 1-2 sprays into both nostrils daily    Acute nasopharyngitis       furosemide 40 MG tablet    LASIX    90 tablet    Take 1.5 tablets (60 mg) by mouth daily    Hyperkalemia, Edema, unspecified type       hydrALAZINE 50 MG tablet    APRESOLINE    60 tablet    Take 1 tablet (50 mg) by mouth 2 times daily    Essential hypertension, Chronic kidney disease, stage 4 (severe) (H)       insulin glargine 100 UNIT/ML injection    LANTUS    15 mL    Inject 8 Units Subcutaneous every morning    Controlled type 2 diabetes mellitus with stage 4 chronic kidney disease, with long-term current use of insulin (H)       insulin pen needle 31G X 6 MM     120 each    Use as directed    Type 2 diabetes mellitus without complication, with long-term current use of insulin (H)       levothyroxine 200 MCG tablet    SYNTHROID/LEVOTHROID    90 tablet    Take 1 tablet (200 mcg) by mouth daily    Other specified hypothyroidism       metoprolol succinate 25 MG 24 hr tablet    TOPROL-XL    90 tablet    Take 1 tablet (25 mg) by mouth  daily    Hypertension associated with diabetes (H)       mometasone-formoterol 100-5 MCG/ACT oral inhaler    DULERA    1 Inhaler    Inhale 2 puffs into the lungs 2 times daily    COPD (chronic obstructive pulmonary disease) (H)       nicotine polacrilex 2 MG lozenge    COMMIT    100 lozenge    Dissolve 1 lozenge orally every 4 hours as directed.    Chronic obstructive pulmonary disease, unspecified COPD type (H)       nitroGLYcerin 0.4 MG sublingual tablet    NITROSTAT    25 tablet    Place 1 tablet (0.4 mg) under the tongue every 5 minutes as needed for chest pain    History of MI (myocardial infarction)       nystatin 184470 UNIT/GM Powd    MYCOSTATIN    30 g    Apply 1 g topically 3 times daily as needed    Groin rash       * order for DME     1 Device    One wheeled walker with seat and brakes and basket    Risk for falls       * order for DME     2 each    Equipment being ordered: Compression socks. Strength:15-20 mmHg    Localized edema       order for DME     1 each    Equipment being ordered: Diabetic Shoes    Type 2 diabetes mellitus with stage 5 chronic kidney disease not on chronic dialysis, without long-term current use of insulin (H)       order for DME     2 Device    Equipment being ordered: two pairs moderate knee high support hose    Edema, unspecified type       oxyCODONE IR 10 MG tablet    ROXICODONE    90 tablet    Take 1 tablet (10 mg) by mouth every 8 hours as needed    Chronic low back pain without sciatica, unspecified back pain laterality       polyethylene glycol powder    MIRALAX    510 g    Take 17 g (1 capful) by mouth daily as needed for constipation    Slow transit constipation       SM ALCOHOL PREP 70 % Pads     100 each    Externally apply 1 pad topically 4 times daily    Type 2 diabetes mellitus without complication, with long-term current use of insulin (H)       sodium bicarbonate 325 MG tablet     360 tablet    Take 2 tablets (650 mg) by mouth 2 times daily    Acidosis, CKD  (chronic kidney disease) stage 4, GFR 15-29 ml/min (H)       tiotropium 18 MCG capsule    SPIRIVA HANDIHALER    90 capsule    Inhale contents of one capsule daily.    Pulmonary emphysema, unspecified emphysema type (H)       vitamin D 2000 units tablet     60 tablet    Take 2,000 Units by mouth daily    Vitamin D deficiency disease       * Notice:  This list has 2 medication(s) that are the same as other medications prescribed for you. Read the directions carefully, and ask your doctor or other care provider to review them with you.

## 2018-08-03 NOTE — TELEPHONE ENCOUNTER
Patient informed that Rx for requested oxycodone is at  to . Patient will  today.     Kallie Pritchard RN  08/03/18  11:02 AM
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
## 2018-08-03 NOTE — PATIENT INSTRUCTIONS
Call Carlota Beckwith, Transplant Coordinator to schedule remaining part of your transplant w/u. Her telephone number is [phone].

## 2018-08-03 NOTE — NURSING NOTE
"Chief Complaint   Patient presents with     RECHECK     1 mo kidney follow up     /77  Pulse 67  Ht 1.676 m (5' 6\")  Wt 73 kg (161 lb)  SpO2 99%  BMI 25.99 kg/m2  ANNALEE SYED CMA    "

## 2018-08-07 ENCOUNTER — PATIENT OUTREACH (OUTPATIENT)
Dept: GERIATRIC MEDICINE | Facility: CLINIC | Age: 73
End: 2018-08-07

## 2018-08-07 NOTE — PROGRESS NOTES
[facility] Care Coordination Contact  Arranged transportation thru [facility] for the below appt:  Appt Date & Time: 8/13 @ 10:15 am  Clinic Name & Address:  [facility] [address] [address]  Transportation Provider: [employer]   time:  9:15-9:45 am    Arranged transportation thru [facility] for the below appt:  Appt Date & Time: 8/16 @ 11:00 am  Clinic Name & Address:   [facility]  Transportation Provider: [facility]   time:  10:00-10:30 am    Arranged transportation thru [facility] for the below appt:  Appt Date & Time: 8/30 @ 10:30 am  Clinic Name & Address:   [facility]  Transportation Provider: [employer]   time:  9:30-10:00 am    Notified Kim of  time.    Brianna Barrios  Case Management Specialist  [facility]   [phone]

## 2018-08-08 ENCOUNTER — DOCUMENTATION ONLY (OUTPATIENT)
Dept: TRANSPLANT | Facility: CLINIC | Age: 73
End: 2018-08-08

## 2018-08-08 NOTE — PROGRESS NOTES
[facility] Care Coordination Contact  Second attempt to reach member.  Spoke with member to schedule visit.  States she is leaving to visit her sister. Is not sure how long she will be gone.  Will contact CM when she returns to schedule visit.   Batool Mai RN, BSN, PHN  [facility]  [phone]  Fax: [phone]

## 2018-08-10 DIAGNOSIS — E11.9 TYPE 2 DIABETES MELLITUS WITHOUT COMPLICATION, WITH LONG-TERM CURRENT USE OF INSULIN (H): ICD-10-CM

## 2018-08-10 DIAGNOSIS — Z79.4 TYPE 2 DIABETES MELLITUS WITHOUT COMPLICATION, WITH LONG-TERM CURRENT USE OF INSULIN (H): ICD-10-CM

## 2018-08-13 ENCOUNTER — TRANSFERRED RECORDS (OUTPATIENT)
Dept: HEALTH INFORMATION MANAGEMENT | Facility: CLINIC | Age: 73
End: 2018-08-13

## 2018-08-13 ENCOUNTER — OFFICE VISIT (OUTPATIENT)
Dept: OPHTHALMOLOGY | Facility: CLINIC | Age: 73
End: 2018-08-13
Attending: OPHTHALMOLOGY
Payer: COMMERCIAL

## 2018-08-13 DIAGNOSIS — Z96.1 PSEUDOPHAKIA, BOTH EYES: ICD-10-CM

## 2018-08-13 DIAGNOSIS — H52.4 PRESBYOPIA: ICD-10-CM

## 2018-08-13 DIAGNOSIS — H52.13 MYOPIC ASTIGMATISM OF BOTH EYES: ICD-10-CM

## 2018-08-13 DIAGNOSIS — H52.203 MYOPIC ASTIGMATISM OF BOTH EYES: ICD-10-CM

## 2018-08-13 DIAGNOSIS — H04.123 DRY EYES, BILATERAL: ICD-10-CM

## 2018-08-13 DIAGNOSIS — E11.9 DIABETES MELLITUS TYPE 2 WITHOUT RETINOPATHY (H): Primary | ICD-10-CM

## 2018-08-13 PROCEDURE — G0463 HOSPITAL OUTPT CLINIC VISIT: HCPCS | Mod: ZF

## 2018-08-13 PROCEDURE — 92015 DETERMINE REFRACTIVE STATE: CPT | Mod: GY,ZF

## 2018-08-13 RX ORDER — ISOPROPYL ALCOHOL 0.7 ML/1
1 SWAB TOPICAL 4 TIMES DAILY
Qty: 100 EACH | Refills: 11 | Status: SHIPPED | OUTPATIENT
Start: 2018-08-13 | End: 2019-08-22

## 2018-08-13 ASSESSMENT — REFRACTION_WEARINGRX
OD_CYLINDER: +0.50
OS_CYLINDER: +0.75
OD_AXIS: 070
OD_SPHERE: -1.00
OS_AXIS: 105
OD_ADD: +2.50
OS_ADD: +2.50
OS_SPHERE: -0.75
SPECS_TYPE: BIF

## 2018-08-13 ASSESSMENT — VISUAL ACUITY
OD_CC: J1+
OD_CC+: -1+3
OS_CC: J1+
METHOD: SNELLEN - LINEAR
OS_CC+: -1
OS_CC: 20/20
OD_CC: 20/30

## 2018-08-13 ASSESSMENT — TONOMETRY
OD_IOP_MMHG: 14
IOP_METHOD: TONOPEN
OS_IOP_MMHG: 15

## 2018-08-13 ASSESSMENT — REFRACTION_MANIFEST
OD_ADD: +2.25
OS_ADD: +2.25
OD_AXIS: 120
OS_CYLINDER: SPHERE
OD_SPHERE: -1.00
OS_SPHERE: -0.25
OD_CYLINDER: +0.50

## 2018-08-13 ASSESSMENT — CONF VISUAL FIELD
OS_NORMAL: 1
METHOD: COUNTING FINGERS
OD_NORMAL: 1

## 2018-08-13 ASSESSMENT — SLIT LAMP EXAM - LIDS
COMMENTS: NORMAL
COMMENTS: NORMAL

## 2018-08-13 ASSESSMENT — CUP TO DISC RATIO
OS_RATIO: 0.2
OD_RATIO: 0.2

## 2018-08-13 ASSESSMENT — EXTERNAL EXAM - RIGHT EYE: OD_EXAM: NORMAL

## 2018-08-13 ASSESSMENT — EXTERNAL EXAM - LEFT EYE: OS_EXAM: NORMAL

## 2018-08-13 NOTE — NURSING NOTE
Chief Complaints and History of Present Illnesses   Patient presents with     Eye Exam For Diabetes     1 yr DM 2 CEE     HPI    Affected eye(s):  Both   Symptoms:     Decreased vision   Floaters (Comment: Seen floaters since CE BE surgery about 2 years ago.)   No flashes   No tearing   Dryness   Itching (Comment: BE last couple of days.)   No burning         Do you have eye pain now?:  No      Comments:  Pt states LE has been aching for about 2 weeks during the day.  Pt feels VA has decreased in the past year and would like new glasses Rx today.    BS between 90 and 101 in the mornings, did not check today.  Lab Results       Component                Value               Date                       A1C                      5.8                 07/26/2018                 A1C                      6.0                 01/16/2018                 A1C                      6.6                 10/25/2017                 A1C                      6.4                 09/07/2017                 A1C                      6.6                 05/10/2017              Claudio JOLLEY August 13, 2018 12:34 PM  Lori MUÑOZ August 13, 2018 12:34 PM

## 2018-08-13 NOTE — PROGRESS NOTES
HPI  Kim Anne is a 72 year old female here for diabetic eye exam. She has noted more intermittent blurring of distance vision. No eye pain, redness, discharge. She states her diabetes is doing well with good blood sugars.     Assessment & Plan    (E11.9) Diabetes mellitus type 2 without retinopathy (HCC)  (primary encounter diagnosis)  Comment: On insulin. Last A1c 5.8 7/2018. No diabetic retinopathy  Plan: Discussed the importance of tight blood glucose control in the prevention of diabetic retinopathy. Recommend yearly dilated eye exam.    (Z96.1) Pseudophakia, both eyes  Comment: Clear visual axis  Plan: Observe    (H04.123) Dry eyes, bilateral  Comment: right eye > left eye, causing some blurring  Plan: Recommend ATs 3-4x daily OU    (H52.203) Myopic astigmatism of both eyes  (H52.4) Presbyopia  Comment: Good vision with current glasses, mildly limited right eye by dryness  Plan: Observe     -----------------------------------------------------------------------------------    Patient disposition:   Return in about 1 year (around 8/13/2019). or sooner as needed.    Teaching statement:  Complete documentation of historical and exam elements from today's encounter can be found in the full encounter summary report (not reduplicated in this progress note). I personally obtained the chief complaint(s) and history of present illness.  I confirmed and edited as necessary the review of systems, past medical/surgical history, family history, social history, and examination findings as documented by others; and I examined the patient myself. I personally reviewed the relevant tests, images, and reports as documented above.     I formulated and edited as necessary the assessment and plan and discussed the findings and management plan with the patient and family.      Cyn Gabriel MD  Comprehensive Ophthalmology & Ocular Pathology  Department of Ophthalmology and Visual  [email or web address]  Pager [phone]

## 2018-08-13 NOTE — MR AVS SNAPSHOT
After Visit Summary   8/13/2018    Kim Anne    MRN: [id number]           Patient Information     Date Of Birth          1945        Visit Information        Provider Department      8/13/2018 10:15 AM [facility]        Today's Diagnoses     Diabetes mellitus type 2 without retinopathy (H)    -  1    Pseudophakia, both eyes        Dry eyes, bilateral        Myopic astigmatism of both eyes        Presbyopia           Follow-ups after your visit        Follow-up notes from your care team     Return in about 1 year (around 8/13/2019).      Your next 10 appointments already scheduled     Aug 16, 2018 11:00 AM CDT   SHORT with Carmen Elder [facility] ([facility])    [address] [place] [address]  [address]  [facility] [id number] [phone]            Aug 30, 2018 10:30 AM CDT   Return Visit with JUNIOR [facility] ([facility])    [address] [place] [address]  [address]  [facility] [id number] [phone]            Aug 30, 2018 10:30 AM CDT   Return Visit with Aviva Lackey [facility] ([facility])    [address] [place] [address]  [address]  [facility] [id number] [phone]            Nov 16, 2018  8:45 AM CST   Lab with  LAB    Health [facility] ([facility])    [address]  1st Floor  [facility] [id number] [phone]            Nov 16, 2018  9:40 AM CST   (Arrive by 9:10 AM)   Return Visit with Bhargav Lopes MD   [facility] ([facility])    [address]  [address]  [facility] [id number] [phone]              Who to contact     Please call your clinic at [phone] to:    Ask questions about your health    Make or cancel appointments    Discuss your  medicines    Learn about your test results    Speak to your doctor            Additional Information About Your Visit        MyChart Information     [employer] is an electronic gateway that provides easy, online access to your medical records. With TPACKt, you can request a clinic appointment, read your test results, renew a prescription or communicate with your care team.     To sign up for TPACKt visit the website at [email or web address]   You will be asked to enter the access code listed below, as well as some personal information. Please follow the directions to create your username and password.     Your access code is: [email or web address]  Expires: 2018  6:30 AM     Your access code will  in 90 days. If you need help or a new code, please contact your [facility] or call [phone] for assistance.        Care EveryWhere ID     This is your Care EveryWhere ID. This could be used by other organizations to access your [facility] medical records  [id number]         Blood Pressure from Last 3 Encounters:   18 183/77   18 138/68   18 148/62    Weight from Last 3 Encounters:   18 73 kg (161 lb)   18 76 kg (167 lb 8 oz)   18 76.2 kg (168 lb)              Today, you had the following     No orders found for display       Primary Care Provider Office Phone # Fax #    Ailyn CRISTELA SeguraNievesJUNIOR [phone]       [address] [facility] [address]  [facility] [address]        Equal Access to Services     CECIL TOLLIVER : Hadii aad ku hadasho Soomaali, waaxda luqadaha, qaybta kaalmada adeegyada, waxay idiin hayaan laureneg mariyaaraanna la'anastasiia . So [facility] [phone].    ATENCIÓN: Si habla español, tiene a bailey disposición servicios gratuitos de asistencia lingüística. Llame al [phone].    We comply with applicable federal civil rights laws and [place] laws. We do not discriminate on the basis of race, color, national origin, age, disability, sex,  sexual orientation, or gender identity.            Thank you!     Thank you for choosing [facility]  for your care. Our goal is always to provide you with excellent care. Hearing back from our patients is one way we can continue to improve our services. Please take a few minutes to complete the written survey that you may receive in the mail after your visit with us. Thank you!             Your Updated Medication List - Protect others around you: Learn how to safely use, store and throw away your medicines at www.disposemymeds.org.          This list is accurate as of 8/13/18 12:02 PM.  Always use your most recent med list.                   Brand Name Dispense Instructions for use Diagnosis    albuterol 108 (90 Base) MCG/ACT inhaler    PROAIR HFA/PROVENTIL HFA/VENTOLIN HFA    18 g    Inhale 2 puffs into the lungs every 6 hours as needed for shortness of breath / dyspnea or wheezing    Chronic obstructive pulmonary disease, unspecified COPD type (H)       amLODIPine 5 MG tablet    NORVASC    60 tablet    Take 2 tablets (10 mg) by mouth daily    Renovascular hypertension       ASPIR-LOW 81 MG EC tablet   Generic drug:  aspirin     120 tablet    TAKE 1 TABLET BY MOUTH EVERY DAY    History of MI (myocardial infarction), Essential hypertension, benign       atorvastatin 40 MG tablet    LIPITOR    90 tablet    Take 1 tablet (40 mg) by mouth daily    Hyperlipidemia LDL goal <100       blood glucose monitoring lancets     100 each    Test BS two times daily as directed    Type 2 diabetes mellitus without complication, with long-term current use of insulin (H)       blood glucose monitoring meter device kit    no brand specified    1 kit    Use to test blood sugar 2 times daily or as directed. Preferred blood glucose meter OR supplies to accompany: Blood Glucose Monitor Brands: per insurance.    Type 2 diabetes mellitus without complication, with long-term current use of insulin (H)       blood glucose monitoring test strip     FREESTYLE LITE    50 strip    TEST BLOOD SUGAR TWO TIMES A DAY    Type 2 diabetes mellitus without complication, with long-term current use of insulin (H)       Blood Pressure Monitor Kit     1 kit    Automatic Blood Pressure Monitor    Renovascular hypertension       docusate sodium 100 MG capsule    COLACE    60 capsule    Take 1 capsule (100 mg) by mouth 2 times daily    Constipation, unspecified constipation type       EUCERIN CALMING DAILY MOIST Crea     1 Tube    Externally apply 1 dose * topically daily    Type II or unspecified type diabetes mellitus with neurological manifestations, not stated as uncontrolled(250.60) (H)       ferrous sulfate 325 (65 Fe) MG tablet    IRON    100 tablet    Take 1 tablet (325 mg) by mouth daily (with breakfast)    Iron deficiency anemia, unspecified iron deficiency anemia type       fluticasone 50 MCG/ACT spray    FLONASE    1 Bottle    Spray 1-2 sprays into both nostrils daily    Acute nasopharyngitis       furosemide 40 MG tablet    LASIX    90 tablet    Take 1.5 tablets (60 mg) by mouth daily    Hyperkalemia, Edema, unspecified type       hydrALAZINE 50 MG tablet    APRESOLINE    60 tablet    Take 1 tablet (50 mg) by mouth 2 times daily    Essential hypertension, Chronic kidney disease, stage 4 (severe) (H)       insulin glargine 100 UNIT/ML injection    LANTUS    15 mL    Inject 8 Units Subcutaneous every morning    Controlled type 2 diabetes mellitus with stage 4 chronic kidney disease, with long-term current use of insulin (H)       insulin pen needle 31G X 6 MM     120 each    Use as directed    Type 2 diabetes mellitus without complication, with long-term current use of insulin (H)       levothyroxine 200 MCG tablet    SYNTHROID/LEVOTHROID    90 tablet    Take 1 tablet (200 mcg) by mouth daily    Other specified hypothyroidism       metoprolol succinate 25 MG 24 hr tablet    TOPROL-XL    90 tablet    Take 1 tablet (25 mg) by mouth daily    Hypertension associated  with diabetes (H)       mometasone-formoterol 100-5 MCG/ACT oral inhaler    DULERA    1 Inhaler    Inhale 2 puffs into the lungs 2 times daily    COPD (chronic obstructive pulmonary disease) (H)       nicotine polacrilex 2 MG lozenge    COMMIT    100 lozenge    Dissolve 1 lozenge orally every 4 hours as directed.    Chronic obstructive pulmonary disease, unspecified COPD type (H)       nitroGLYcerin 0.4 MG sublingual tablet    NITROSTAT    25 tablet    Place 1 tablet (0.4 mg) under the tongue every 5 minutes as needed for chest pain    History of MI (myocardial infarction)       nystatin 164013 UNIT/GM Powd    MYCOSTATIN    30 g    Apply 1 g topically 3 times daily as needed    Groin rash       * order for DME     1 Device    One wheeled walker with seat and brakes and basket    Risk for falls       * order for DME     2 each    Equipment being ordered: Compression socks. Strength:15-20 mmHg    Localized edema       order for DME     1 each    Equipment being ordered: Diabetic Shoes    Type 2 diabetes mellitus with stage 5 chronic kidney disease not on chronic dialysis, without long-term current use of insulin (H)       order for DME     2 Device    Equipment being ordered: two pairs moderate knee high support hose    Edema, unspecified type       oxyCODONE IR 10 MG tablet    ROXICODONE    90 tablet    Take 1 tablet (10 mg) by mouth every 8 hours as needed    Chronic low back pain without sciatica, unspecified back pain laterality       polyethylene glycol powder    MIRALAX    510 g    Take 17 g (1 capful) by mouth daily as needed for constipation    Slow transit constipation       SM ALCOHOL PREP 70 % Pads     100 each    Externally apply 1 pad topically 4 times daily    Type 2 diabetes mellitus without complication, with long-term current use of insulin (H)       sodium bicarbonate 325 MG tablet     360 tablet    Take 2 tablets (650 mg) by mouth 2 times daily    Acidosis, CKD (chronic kidney disease) stage 4, GFR  15-29 ml/min (H)       tiotropium 18 MCG capsule    SPIRIVA HANDIHALER    90 capsule    Inhale contents of one capsule daily.    Pulmonary emphysema, unspecified emphysema type (H)       vitamin D 2000 units tablet     60 tablet    Take 2,000 Units by mouth daily    Vitamin D deficiency disease       * Notice:  This list has 2 medication(s) that are the same as other medications prescribed for you. Read the directions carefully, and ask your doctor or other care provider to review them with you.

## 2018-08-13 NOTE — TELEPHONE ENCOUNTER
Medication Refill Request    Medication(s) requested:  Alcohol Swabs (SM ALCOHOL PREP) 70 % PADS    Last Office Visit: 7/26/18  Labs: up to date  Next Office Visit: 8/30/18    Rx approved and refilled per [facility] refill protocol    Kallie Pritchard RN  08/13/18  10:18 AM

## 2018-08-14 ENCOUNTER — TELEPHONE (OUTPATIENT)
Dept: TRANSPLANT | Facility: CLINIC | Age: 73
End: 2018-08-14

## 2018-08-15 ENCOUNTER — DOCUMENTATION ONLY (OUTPATIENT)
Dept: TRANSPLANT | Facility: CLINIC | Age: 73
End: 2018-08-15

## 2018-08-15 NOTE — TELEPHONE ENCOUNTER
Patient called saying she does want to remain on kidney wait list and that she is doing pretty well. She thought she would be contacted when she needed to come back.  I asked her if she is willing get her testing/appointments and she is agreeable and says she has transportation to come here.

## 2018-08-16 ENCOUNTER — OFFICE VISIT (OUTPATIENT)
Dept: PHARMACY | Facility: CLINIC | Age: 73
End: 2018-08-16
Payer: COMMERCIAL

## 2018-08-16 ENCOUNTER — TELEPHONE (OUTPATIENT)
Dept: TRANSPLANT | Facility: CLINIC | Age: 73
End: 2018-08-16

## 2018-08-16 VITALS — SYSTOLIC BLOOD PRESSURE: 164 MMHG | DIASTOLIC BLOOD PRESSURE: 82 MMHG

## 2018-08-16 DIAGNOSIS — N18.6 ESRD (END STAGE RENAL DISEASE) (H): ICD-10-CM

## 2018-08-16 DIAGNOSIS — I15.2 HYPERTENSION ASSOCIATED WITH DIABETES (H): ICD-10-CM

## 2018-08-16 DIAGNOSIS — J44.9 CHRONIC OBSTRUCTIVE PULMONARY DISEASE, UNSPECIFIED COPD TYPE (H): ICD-10-CM

## 2018-08-16 DIAGNOSIS — I10 ESSENTIAL HYPERTENSION: ICD-10-CM

## 2018-08-16 DIAGNOSIS — E55.9 VITAMIN D DEFICIENCY DISEASE: ICD-10-CM

## 2018-08-16 DIAGNOSIS — Z76.82 ORGAN TRANSPLANT CANDIDATE: Primary | ICD-10-CM

## 2018-08-16 DIAGNOSIS — E11.9 TYPE 2 DIABETES MELLITUS WITHOUT COMPLICATION, WITH LONG-TERM CURRENT USE OF INSULIN (H): ICD-10-CM

## 2018-08-16 DIAGNOSIS — N18.4 CHRONIC KIDNEY DISEASE, STAGE 4 (SEVERE) (H): ICD-10-CM

## 2018-08-16 DIAGNOSIS — Z79.4 TYPE 2 DIABETES MELLITUS WITHOUT COMPLICATION, WITH LONG-TERM CURRENT USE OF INSULIN (H): ICD-10-CM

## 2018-08-16 DIAGNOSIS — E11.59 HYPERTENSION ASSOCIATED WITH DIABETES (H): ICD-10-CM

## 2018-08-16 DIAGNOSIS — I15.0 RENOVASCULAR HYPERTENSION: Primary | ICD-10-CM

## 2018-08-16 DIAGNOSIS — D50.9 IRON DEFICIENCY ANEMIA, UNSPECIFIED IRON DEFICIENCY ANEMIA TYPE: ICD-10-CM

## 2018-08-16 PROCEDURE — 99606 MTMS BY PHARM EST 15 MIN: CPT | Performed by: PHARMACIST

## 2018-08-16 PROCEDURE — 99607 MTMS BY PHARM ADDL 15 MIN: CPT | Performed by: PHARMACIST

## 2018-08-16 RX ORDER — ERGOCALCIFEROL 1.25 MG/1
50000 CAPSULE ORAL WEEKLY
Qty: 8 CAPSULE | Refills: 0 | Status: SHIPPED | OUTPATIENT
Start: 2018-08-16 | End: 2018-10-22

## 2018-08-16 RX ORDER — CHOLECALCIFEROL (VITAMIN D3) 50 MCG
2000 TABLET ORAL DAILY
Qty: 90 TABLET | Refills: 3 | Status: SHIPPED | OUTPATIENT
Start: 2018-08-16 | End: 2019-08-22

## 2018-08-16 RX ORDER — AMLODIPINE BESYLATE 10 MG/1
10 TABLET ORAL DAILY
Qty: 90 TABLET | Refills: 3 | Status: SHIPPED | OUTPATIENT
Start: 2018-08-16 | End: 2018-11-23

## 2018-08-16 RX ORDER — ALBUTEROL SULFATE 90 UG/1
2 AEROSOL, METERED RESPIRATORY (INHALATION) EVERY 6 HOURS PRN
Qty: 18 G | Refills: 3 | Status: SHIPPED | OUTPATIENT
Start: 2018-08-16 | End: 2018-11-23

## 2018-08-16 RX ORDER — METOPROLOL SUCCINATE 25 MG/1
25 TABLET, EXTENDED RELEASE ORAL DAILY
Qty: 90 TABLET | Refills: 3 | Status: SHIPPED | OUTPATIENT
Start: 2018-08-16 | End: 2019-04-03

## 2018-08-16 RX ORDER — HYDRALAZINE HYDROCHLORIDE 25 MG/1
25 TABLET, FILM COATED ORAL 2 TIMES DAILY
COMMUNITY
Start: 2018-08-16 | End: 2019-01-04

## 2018-08-16 NOTE — Clinical Note
FYI, numerous problems identified in following her medication regimen, including not taking 2 antihypertensives. See plan for details

## 2018-08-16 NOTE — PROGRESS NOTES
"SUBJECTIVE/OBJECTIVE:                Kim Anne is a 72 year old female coming in for a follow-up visit for Medication Therapy Management.  She was referred to me from Ailyn Nieves.     Chief Complaint: Follow up from our visit on 7/26/18.    Tobacco: 0-1 pack per day - is interested in quittingTobacco Cessation Action Plan: NRT   Alcohol: not currently using    Medication Adherence/Access:  Issues found and discussed below. Patient's granddaughter helps pt set up meds. Sometimes patient sets them up herself. Uses a weekly pill box.   Brought in all her pill bottles and inhalers today for review.    Diabetes:  Pt currently taking Lantus 8 units daily. Pt is not experiencing side effects.  SMBG: one time daily.   Ranges (glucometer): see below  Reports feeling good overall.  Patient is not experiencing hypoglycemia.  Recent symptoms of high blood sugar? none  Eye exam: Up to date  Foot exam: Up to date.  ACEi/ARB: No-due to history of hyperkalemia.   Urine Albumin:   Lab Results   Component Value Date    UMALCR 6325.80 (H) 07/07/2017      Date FBG/ 2hours post Lunch/2hours post Dinner /2hours post    8/16 91      8/15 95/187       88        120      95       89       101        Aspirin: Taking 81mg daily and denies side effects.  Diet/Exercise: Reports walking about 10 minutes daily with family.  Hemoglobin A1C   Date Value Ref Range Status   07/26/2018 5.8 (H) 0 - 5.6 % Final     Comment:     Normal <5.7% Prediabetes 5.7-6.4%  Diabetes 6.5% or higher - adopted from ADA   consensus guidelines.         Hypertension: Current therapy includes hydralazine 25mg BID (did not increase to 50mg as prescribed). Has not been taking amlodipine or metoprolol in >1 month per refill history. \"the medicines weren't refilled so I assumed the doctor didn't want me to take them anymore\" Patient does self-monitor BP. Home BP monitoring in range of 160-180's systolic over 70-80's diastolic.  Patient reports no current " "medication side effects. Reports no dizziness or lightheadedness. Brought in machine today to check accuracy.     COPD: Current therapy includes Spiriva twice daily (not daily as prescribed), Dulera using PRN SOB (not BID as prescribed). Doesn't have any albuterol.   Reports no SOB or cough.  Pt is not experiencing side effects.   Pt reports the following symptoms: none.  Pt does have an COPD Action Plan on file.     Vitamin D deficiency: has not been taking any vitamin D recently, out of refills.   Vitamin D Deficiency Screening Results:  Lab Results   Component Value Date    VITDT 11 (L) 08/03/2018    VITDT 12 (L) 02/16/2018    VITDT 15 (L) 09/11/2017    VITDT 14 (L) 10/11/2016    VITDT 16 (L) 08/29/2016     CKD/anemia: currently taking iron 1 tab daily but states she \"hates taking it\".  Causes GI discomfort, constipation and discoloration to stools. States she is going to stop taking it.  Hemoglobin   Date Value Ref Range Status   08/03/2018 11.9 11.7 - 15.7 g/dL Final   ]   Ferritin   Date Value Ref Range Status   02/16/2018 134 8 - 252 ng/mL Final     Iron   Date Value Ref Range Status   02/16/2018 46 35 - 180 ug/dL Final     Iron Binding Cap   Date Value Ref Range Status   02/16/2018 163 (L) 240 - 430 ug/dL Final       Today's Vitals: /82      ASSESSMENT:              Current medications were reviewed today as discussed above.      Medication Adherence: fair, needs improvement - see below    Diabetes: improved. A1c well below goal <8%. Hypoglycemia has resolved, will monitor and consider further decrease or possible discontinuation of Lantus.     Hypertension: needs improvement. BP above goal <140/90. Has been off amlodipine and metoprolol; will restart both these medications and continue hydralazine at current dose.    COPD: needs improvement. Education provided on inhaler use, controller vs reliever. Refilled albuterol     Vit D deficiency: needs improvement. Refilled daily vit D and added 2 months of " weekly high dose vit D.     CKD/anemia: stable. Will inform nephrology pt is stopping iron supplement.        PLAN:                  1. Continue hydralazine at 25mg BID  2. Restart amlodipine and metoprolol  3. Restart daily vitamin D and add [id number] weekly x2 months  4. Decrease Spiriva to once daily as prescribed  5. Increase Dulera to BID as prescribed  6.  albuterol MDI for PRN use    I spent 45 minutes with this patient today. All changes were made via collaborative practice agreement with Ailyn Nieves. A copy of the visit note was provided to the patient's primary care provider.     Will follow up in 1 month.    The patient was given a summary of these recommendations as an after visit summary.    Carmen Elder, PharmD, BCACP

## 2018-08-16 NOTE — MR AVS SNAPSHOT
After Visit Summary   8/16/2018    Kim Anne    MRN: [id number]           Patient Information     Date Of Birth          1945        Visit Information        Provider Department      8/16/2018 11:00 AM Carmen Elder, [facility] MTM        Today's Diagnoses     Renovascular hypertension        Hypertension associated with diabetes (H)        Vitamin D deficiency disease        Chronic obstructive pulmonary disease, unspecified COPD type (H)        Essential hypertension        Chronic kidney disease, stage 4 (severe) (H)          Care Instructions    Recommendations from today's MTM visit:                                                        1.  from pharmacy: 2 blood pressure pills (amlodipine and metoprolol).      2. Restart vitamin D. Also take the weekly vitamin D pill to boost up the vitamin in your body.  This is good for your bones.    3. Use Spiriva (grey inhaler) only 1 time a day.  Use Dulera (blue inhaler) twice a day.    4.  albuterol inhaler to use as needed (new)    5. I'll let Dr Lopes know you are stopping iron    Next MTM visit: 1 month    To schedule another MTM appointment, please call the clinic directly or you may call the MTM scheduling line at [phone] or toll-free at [phone].     My Clinical Pharmacist's contact information:                                                      It was a pleasure seeing you today!  Please feel free to contact me with any questions or concerns you have.      Carmen Elder, PharmD, [facility]   [phone]    You may receive a survey about the MTM services you received.  I would appreciate your feedback to help me serve you better in the future. Please fill it out and return it when you can. Your comments will be anonymous.                    Follow-ups after your visit        Your next 10 appointments already scheduled     Aug 30, 2018 10:30 AM CDT   Return Visit  "with JUNIOR Bishop CNP   [facility] ([facility])    [address]  [address]  [facility] [id number] [phone]            Aug 30, 2018 10:30 AM CDT   Return Visit with LAMINE Chahal   [facility] ([facility])    [address]  [address]  [facility] [id number] [phone]            Nov 16, 2018  8:45 AM CST   Lab with  LAB   [facility] ([facility])    [address]  1st Floor  [facility] [id number] [phone]            Nov 16, 2018  9:40 AM CST   (Arrive by 9:10 AM)   Return Visit with Bhargav Lopes MD   [facility]ph[facility] ([facility])    [address]  [address]  [facility] [id number] [phone]              Who to contact     If you have questions or need follow up information about today's clinic visit or your schedule please contact [facility] PRIMARY [facility] MTM directly at [phone].  Normal or non-critical lab and imaging results will be communicated to you by MyChart, letter or phone within 4 business days after the clinic has received the results. If you do not hear from us within 7 days, please contact the clinic through MyChart or phone. If you have a critical or abnormal lab result, we will notify you by phone as soon as possible.  Submit refill requests through [employer] or call your pharmacy and they will forward the refill request to us. Please allow 3 business days for your refill to be completed.          Additional Information About Your Visit        [employer] Information     [employer] lets you send messages to your doctor, view your test results, renew your prescriptions, schedule appointments and more. To sign up, go to [email or web address] . Click on \"Log in\" on the left side of the screen, which will take you to the Welcome page. Then click " "on \"Sign up Now\" on the right side of the page.     You will be asked to enter the access code listed below, as well as some personal information. Please follow the directions to create your username and password.     Your access code is: [email or web address]  Expires: 2018  6:30 AM     Your access code will  in 90 days. If you need help or a new code, please call your [place] clinic or [phone].        Care EveryWhere ID     This is your Care EveryWhere ID. This could be used by other organizations to access your [place] medical records  [id number]         Blood Pressure from Last 3 Encounters:   18 164/82   18 183/77   18 138/68    Weight from Last 3 Encounters:   18 161 lb (73 kg)   18 167 lb 8 oz (76 kg)   18 168 lb (76.2 kg)              Today, you had the following     No orders found for display         Today's Medication Changes          These changes are accurate as of 18 11:48 AM.  If you have any questions, ask your nurse or doctor.               Start taking these medicines.        Dose/Directions    ergocalciferol 36423 units capsule   Commonly known as:  ERGOCALCIFEROL   Used for:  Vitamin D deficiency disease   Started by:  Carmen Elder RPH        Dose:  62430 Units   Take 1 capsule (50,000 Units) by mouth once a week   Quantity:  8 capsule   Refills:  0         These medicines have changed or have updated prescriptions.        Dose/Directions    amLODIPine 10 MG tablet   Commonly known as:  NORVASC   This may have changed:  medication strength   Used for:  Renovascular hypertension   Changed by:  Carmen Elder RPH        Dose:  10 mg   Take 1 tablet (10 mg) by mouth daily   Quantity:  90 tablet   Refills:  3       hydrALAZINE 25 MG tablet   Commonly known as:  APRESOLINE   This may have changed:    - medication strength  - how much to take   Used for:  Essential hypertension, Chronic kidney disease, stage 4 (severe) (H) "   Changed by:  Carmen Elder [facility]        Dose:  25 mg   Take 1 tablet (25 mg) by mouth 2 times daily   Refills:  0            Where to get your medicines      These medications were sent to TIFF[facility] SERRAN[facility] [place], [place] - [address]  [address] [address]     Phone:  [phone]     albuterol 108 (90 Base) MCG/ACT inhaler    amLODIPine 10 MG tablet    ergocalciferol 98665 units capsule    metoprolol succinate 25 MG 24 hr tablet    vitamin D 2000 units tablet                Primary Care Provider Office Phone # Fax #    Ailyn Nieves, APRN [phone]       [address]  [facility] [address]        Equal Access to Services     CECIL TOLLIVER : Hadii aad ku hadasho Sofranciscaali, waaxda luqadaha, qaybta kaalmada adeegyada, kassandra white . So [facility] [phone].    ATENCIÓN: Si habla español, tiene a bailey disposición servicios gratuitos de asistencia lingüística. Llame al [phone].    We comply with applicable federal civil rights laws and [place] laws. We do not discriminate on the basis of race, color, national origin, age, disability, sex, sexual orientation, or gender identity.            Thank you!     Thank you for choosing [facility]  for your care. Our goal is always to provide you with excellent care. Hearing back from our patients is one way we can continue to improve our services. Please take a few minutes to complete the written survey that you may receive in the mail after your visit with us. Thank you!             Your Updated Medication List - Protect others around you: Learn how to safely use, store and throw away your medicines at www.disposemymeds.org.          This list is accurate as of 8/16/18 11:48 AM.  Always use your most recent med list.                   Brand Name Dispense Instructions for use Diagnosis    albuterol 108 (90 Base) MCG/ACT inhaler     PROAIR HFA/PROVENTIL HFA/VENTOLIN HFA    18 g    Inhale 2 puffs into the lungs every 6 hours as needed for shortness of breath / dyspnea or wheezing    Chronic obstructive pulmonary disease, unspecified COPD type (H)       amLODIPine 10 MG tablet    NORVASC    90 tablet    Take 1 tablet (10 mg) by mouth daily    Renovascular hypertension       ASPIR-LOW 81 MG EC tablet   Generic drug:  aspirin     120 tablet    TAKE 1 TABLET BY MOUTH EVERY DAY    History of MI (myocardial infarction), Essential hypertension, benign       atorvastatin 40 MG tablet    LIPITOR    90 tablet    Take 1 tablet (40 mg) by mouth daily    Hyperlipidemia LDL goal <100       blood glucose monitoring lancets     100 each    Test BS two times daily as directed    Type 2 diabetes mellitus without complication, with long-term current use of insulin (H)       blood glucose monitoring meter device kit    no brand specified    1 kit    Use to test blood sugar 2 times daily or as directed. Preferred blood glucose meter OR supplies to accompany: Blood Glucose Monitor Brands: per insurance.    Type 2 diabetes mellitus without complication, with long-term current use of insulin (H)       blood glucose monitoring test strip    FREESTYLE LITE    50 strip    TEST BLOOD SUGAR TWO TIMES A DAY    Type 2 diabetes mellitus without complication, with long-term current use of insulin (H)       Blood Pressure Monitor Kit     1 kit    Automatic Blood Pressure Monitor    Renovascular hypertension       docusate sodium 100 MG capsule    COLACE    60 capsule    Take 1 capsule (100 mg) by mouth 2 times daily    Constipation, unspecified constipation type       ergocalciferol 20908 units capsule    ERGOCALCIFEROL    8 capsule    Take 1 capsule (50,000 Units) by mouth once a week    Vitamin D deficiency disease       EUCERIN CALMING DAILY MOIST Crea     1 Tube    Externally apply 1 dose * topically daily    Type II or unspecified type diabetes mellitus with neurological  manifestations, not stated as uncontrolled(250.60) (H)       fluticasone 50 MCG/ACT spray    FLONASE    1 Bottle    Spray 1-2 sprays into both nostrils daily    Acute nasopharyngitis       furosemide 40 MG tablet    LASIX    90 tablet    Take 1.5 tablets (60 mg) by mouth daily    Hyperkalemia, Edema, unspecified type       hydrALAZINE 25 MG tablet    APRESOLINE     Take 1 tablet (25 mg) by mouth 2 times daily    Essential hypertension, Chronic kidney disease, stage 4 (severe) (H)       insulin glargine 100 UNIT/ML injection    LANTUS    15 mL    Inject 8 Units Subcutaneous every morning    Controlled type 2 diabetes mellitus with stage 4 chronic kidney disease, with long-term current use of insulin (H)       insulin pen needle 31G X 6 MM     120 each    Use as directed    Type 2 diabetes mellitus without complication, with long-term current use of insulin (H)       levothyroxine 200 MCG tablet    SYNTHROID/LEVOTHROID    90 tablet    Take 1 tablet (200 mcg) by mouth daily    Other specified hypothyroidism       metoprolol succinate 25 MG 24 hr tablet    TOPROL-XL    90 tablet    Take 1 tablet (25 mg) by mouth daily    Hypertension associated with diabetes (H)       mometasone-formoterol 100-5 MCG/ACT oral inhaler    DULERA    1 Inhaler    Inhale 2 puffs into the lungs 2 times daily    COPD (chronic obstructive pulmonary disease) (H)       nicotine polacrilex 2 MG lozenge    COMMIT    100 lozenge    Dissolve 1 lozenge orally every 4 hours as directed.    Chronic obstructive pulmonary disease, unspecified COPD type (H)       nitroGLYcerin 0.4 MG sublingual tablet    NITROSTAT    25 tablet    Place 1 tablet (0.4 mg) under the tongue every 5 minutes as needed for chest pain    History of MI (myocardial infarction)       nystatin 540308 UNIT/GM Powd    MYCOSTATIN    30 g    Apply 1 g topically 3 times daily as needed    Groin rash       * order for DME     1 Device    One wheeled walker with seat and brakes and basket     Risk for falls       * order for DME     2 each    Equipment being ordered: Compression socks. Strength:15-20 mmHg    Localized edema       order for DME     1 each    Equipment being ordered: Diabetic Shoes    Type 2 diabetes mellitus with stage 5 chronic kidney disease not on chronic dialysis, without long-term current use of insulin (H)       order for DME     2 Device    Equipment being ordered: two pairs moderate knee high support hose    Edema, unspecified type       oxyCODONE IR 10 MG tablet    ROXICODONE    90 tablet    Take 1 tablet (10 mg) by mouth every 8 hours as needed    Chronic low back pain without sciatica, unspecified back pain laterality       polyethylene glycol powder    MIRALAX    510 g    Take 17 g (1 capful) by mouth daily as needed for constipation    Slow transit constipation       SM ALCOHOL PREP 70 % Pads     100 each    Externally apply 1 pad topically 4 times daily    Type 2 diabetes mellitus without complication, with long-term current use of insulin (H)       sodium bicarbonate 325 MG tablet     360 tablet    Take 2 tablets (650 mg) by mouth 2 times daily    Acidosis, CKD (chronic kidney disease) stage 4, GFR 15-29 ml/min (H)       tiotropium 18 MCG capsule    SPIRIVA HANDIHALER    90 capsule    Inhale contents of one capsule daily.    Pulmonary emphysema, unspecified emphysema type (H)       vitamin D 2000 units tablet     90 tablet    Take 2,000 Units by mouth daily    Vitamin D deficiency disease       * Notice:  This list has 2 medication(s) that are the same as other medications prescribed for you. Read the directions carefully, and ask your doctor or other care provider to review them with you.

## 2018-08-16 NOTE — PATIENT INSTRUCTIONS
Recommendations from today's MTM visit:                                                        1.  from pharmacy: 2 blood pressure pills (amlodipine and metoprolol).      2. Restart vitamin D. Also take the weekly vitamin D pill to boost up the vitamin in your body.  This is good for your bones.    3. Use Spiriva (grey inhaler) only 1 time a day.  Use Dulera (blue inhaler) twice a day.    4.  albuterol inhaler to use as needed (new)    5. I'll let Dr Lopes know you are stopping iron    Next MTM visit: 1 month    To schedule another MTM appointment, please call the clinic directly or you may call the MTM scheduling line at [phone] or toll-free at [phone].     My Clinical Pharmacist's contact information:                                                      It was a pleasure seeing you today!  Please feel free to contact me with any questions or concerns you have.      Carmen Elder, PharmD, [facility]   [phone]    You may receive a survey about the MTM services you received.  I would appreciate your feedback to help me serve you better in the future. Please fill it out and return it when you can. Your comments will be anonymous.

## 2018-08-16 NOTE — TELEPHONE ENCOUNTER
Attempted to reach patient on phone; no answer. Writer wanted to inform patient that she will be asked to see ID for positive Quant Gold prior to obtaining active status on wait list.

## 2018-08-30 ENCOUNTER — OFFICE VISIT (OUTPATIENT)
Dept: BEHAVIORAL HEALTH | Facility: CLINIC | Age: 73
End: 2018-08-30
Payer: COMMERCIAL

## 2018-08-30 ENCOUNTER — OFFICE VISIT (OUTPATIENT)
Dept: FAMILY MEDICINE | Facility: CLINIC | Age: 73
End: 2018-08-30
Payer: COMMERCIAL

## 2018-08-30 VITALS
SYSTOLIC BLOOD PRESSURE: 120 MMHG | TEMPERATURE: 98.2 F | HEART RATE: 68 BPM | BODY MASS INDEX: 25.82 KG/M2 | DIASTOLIC BLOOD PRESSURE: 64 MMHG | WEIGHT: 160 LBS | RESPIRATION RATE: 16 BRPM

## 2018-08-30 DIAGNOSIS — M54.50 CHRONIC LOW BACK PAIN WITHOUT SCIATICA, UNSPECIFIED BACK PAIN LATERALITY: Primary | ICD-10-CM

## 2018-08-30 DIAGNOSIS — N18.4 TYPE 2 DIABETES MELLITUS WITH STAGE 4 CHRONIC KIDNEY DISEASE, WITH LONG-TERM CURRENT USE OF INSULIN (H): ICD-10-CM

## 2018-08-30 DIAGNOSIS — I10 HYPERTENSION GOAL BP (BLOOD PRESSURE) < 130/80: ICD-10-CM

## 2018-08-30 DIAGNOSIS — E11.22 TYPE 2 DIABETES MELLITUS WITH STAGE 4 CHRONIC KIDNEY DISEASE, WITH LONG-TERM CURRENT USE OF INSULIN (H): ICD-10-CM

## 2018-08-30 DIAGNOSIS — F43.21 GRIEF: Primary | ICD-10-CM

## 2018-08-30 DIAGNOSIS — F41.1 GAD (GENERALIZED ANXIETY DISORDER): ICD-10-CM

## 2018-08-30 DIAGNOSIS — Z79.4 TYPE 2 DIABETES MELLITUS WITH STAGE 4 CHRONIC KIDNEY DISEASE, WITH LONG-TERM CURRENT USE OF INSULIN (H): ICD-10-CM

## 2018-08-30 DIAGNOSIS — G89.29 CHRONIC LOW BACK PAIN WITHOUT SCIATICA, UNSPECIFIED BACK PAIN LATERALITY: Primary | ICD-10-CM

## 2018-08-30 PROCEDURE — 90832 PSYTX W PT 30 MINUTES: CPT | Performed by: SOCIAL WORKER

## 2018-08-30 PROCEDURE — 99214 OFFICE O/P EST MOD 30 MIN: CPT | Performed by: NURSE PRACTITIONER

## 2018-08-30 RX ORDER — SODIUM BICARBONATE 650 MG/1
TABLET ORAL
COMMUNITY
Start: 2018-07-25 | End: 2019-04-03

## 2018-08-30 RX ORDER — HYDRALAZINE HYDROCHLORIDE 50 MG/1
TABLET, FILM COATED ORAL
COMMUNITY
Start: 2018-08-03 | End: 2019-04-03

## 2018-08-30 RX ORDER — OXYCODONE HYDROCHLORIDE 10 MG/1
10 TABLET ORAL EVERY 8 HOURS PRN
Qty: 90 TABLET | Refills: 0 | Status: SHIPPED | OUTPATIENT
Start: 2018-08-30 | End: 2018-09-28

## 2018-08-30 RX ORDER — PEN NEEDLE, DIABETIC 32GX 5/32"
NEEDLE, DISPOSABLE MISCELLANEOUS
COMMUNITY
Start: 2018-05-21 | End: 2019-04-03

## 2018-08-30 RX ORDER — FERROUS SULFATE 325(65) MG
TABLET ORAL
COMMUNITY
Start: 2018-08-06 | End: 2019-04-03

## 2018-08-30 NOTE — PROGRESS NOTES
SUBJECTIVE:                                                    Kim Anne is a 72 year old  female who presents to clinic today for the following health issues:  [facility]: Aviva DOMINGUEZ    Diabetes Follow-up  Patient is checking blood sugars: once daily.  Results are as follows:         am - 140    Diabetic concerns: None     Symptoms of hypoglycemia (low blood sugar): none     Paresthesias (numbness or burning in feet) or sores: Yes, burning of feet.     Date of last diabetic eye exam: last month    BP Readings from Last 2 Encounters:   08/16/18 164/82   08/03/18 183/77     Hemoglobin A1C (%)   Date Value   07/26/2018 5.8 (H)   01/16/2018 6.0     LDL Cholesterol Calculated (mg/dL)   Date Value   10/25/2017 104 (H)   09/07/2017 81       Diabetes Management Resources      Hypertension Follow-up  Patient denies any headaches, chest pain, shortness of breath and heart palpitations. Pateint reports that she has been getting some bilateral pain from lower back to legs.   Patient reports that she is still forgetting to take her BP medication scheduled for the evening time because she is always so busy.   BP Readings from Last 3 Encounters:   08/30/18 120/64   08/16/18 164/82   08/03/18 183/77       Outpatient blood pressures are being checked at home.  Results are 101/79.    Low Salt Diet: no added salt      Mental Health Follow up  Patient reports that she has been doing well. Reports that the apartment next door to her was raided this morning by the police for drugs. Concerned about her cat that might have been attacked by a racoon and she needs to get it to the vet.     Status since last visit: stable.     See PHQ-9 for current symptoms.  Other associated symptoms: None    Complicating factors: none.   Significant life event:  No   Current substance abuse:  None  Anxiety or Panic symptoms:  No    Sleep - no issues.   Appetite - good, trying to eat healthy.   Exercise - walking a couple blocks with a  walker.     Smoking - 3 cigarettes per day.   Alcohol - no  Street drugs - no  Marijuana - no    PHQ-9  English PHQ-9   Any Language               Problems taking medications regularly: No    Medication side effects: none    Diet: regular (no restrictions)    Social History   Substance Use Topics     Smoking status: Current Every Day Smoker     Packs/day: 0.25     Years: 40.00     Types: Cigarettes     Last attempt to quit: 10/31/2016     Smokeless tobacco: Never Used     Alcohol use No        Problem list and histories reviewed & adjusted, as indicated.  Additional history: as documented    Patient Active Problem List   Diagnosis     Hyperlipidemia LDL goal <100     ARAUZ (dyspnea on exertion)     COPD (chronic obstructive pulmonary disease) (H)     Edema     Fatigue     History of MI (myocardial infarction)     Snores     Knee pain, left     Bunion of great toe of left foot     Dystrophic nail     Arthritis     Peripheral vascular disease (H)     Chronic low back pain     Health Care Home     CKD (chronic kidney disease) stage 4, GFR 15-29 ml/min (H)     Abnormal gait     Advance Care Planning     Vitamin D deficiency     Constipation     Hypertension goal BP (blood pressure) < 130/80     Bradycardia     Callus of foot     Other chronic pain     Cataracts, bilateral     Hyperopic astigmatism of both eyes     Presbyopia     Asymptomatic bunion, right     Acquired hypothyroidism     Type 2 diabetes mellitus with diabetic chronic kidney disease (H)     Hypertension associated with diabetes (H)     Hyperlipidemia associated with type 2 diabetes mellitus (H)     Obesity (BMI 30-39.9)     History of sick sinus syndrome     Nephrotic syndrome     Pneumonia     Grief     Cigarette nicotine dependence without complication     HCOM with LVOT     Hyperkalemia     Type 2 diabetes, HbA1C goal < 8% (H)     Smoker     Single kidney     Hypothyroid     Hypertension goal BP (blood pressure) < 140/90     Hyperlipidemia     Diabetic  nephropathy (H)     Hypoalbuminemia     Skin lesion of hand     ERRONEOUS ENCOUNTER--DISREGARD     Past Surgical History:   Procedure Laterality Date     APPENDECTOMY       BLEPHAROPLASTY BILATERAL  2013    Procedure: BLEPHAROPLASTY BILATERAL;  BILATERAL UPPER EYELID BLEPHAROPLASTY ;  Surgeon: Olayinka Lyon MD;  Location:  [facility]     CATARACT IOL, RT/LT Bilateral      CHOLECYSTECTOMY       COLONOSCOPY  7/15/2011    polyps repeat in 5 years     elected term pregnancy       HYSTEROSCOPIC PLACEMENT CONTRACEPTIVE DEVICE       KIDNEY SURGERY      donated left kideny     OVARY SURGERY      left for cyst benign     subclavian stent  2010    [facility]       Social History   Substance Use Topics     Smoking status: Current Every Day Smoker     Packs/day: 0.25     Years: 40.00     Types: Cigarettes     Last attempt to quit: 10/31/2016     Smokeless tobacco: Never Used     Alcohol use No     Family History   Problem Relation Age of Onset     Diabetes Mother      brother, MGM, sister     KIDNEY DISEASE Brother      X2 DM two      Alcohol/Drug Child      daughter     Asthma No family hx of      C.A.D. No family hx of      Hypertension No family hx of      Cerebrovascular Disease No family hx of      Breast Cancer No family hx of      Cancer - colorectal No family hx of      Prostate Cancer No family hx of      Allergies No family hx of      Alzheimer Disease No family hx of      Anesthesia Reaction No family hx of      Arthritis No family hx of      Blood Disease No family hx of      Cancer No family hx of      Cardiovascular No family hx of      Circulatory No family hx of      Congenital Anomalies No family hx of      Connective Tissue Disorder No family hx of      Depression No family hx of      Eye Disorder No family hx of      Genetic Disorder No family hx of      GASTROINTESTINAL DISEASE No family hx of      Genitourinary Problems No family hx of      Gynecology No family hx of      HEART  DISEASE No family hx of      Lipids No family hx of      Musculoskeletal Disorder No family hx of      Neurologic Disorder No family hx of      Obesity No family hx of      Osteoperosis No family hx of      Psychotic Disorder No family hx of      Respiratory No family hx of      Thyroid Disease No family hx of      Glaucoma No family hx of      Macular Degeneration No family hx of            Current Outpatient Prescriptions   Medication Sig Dispense Refill     albuterol (PROAIR HFA/PROVENTIL HFA/VENTOLIN HFA) 108 (90 Base) MCG/ACT inhaler Inhale 2 puffs into the lungs every 6 hours as needed for shortness of breath / dyspnea or wheezing 18 g 3     Alcohol Swabs (SM ALCOHOL PREP) 70 % PADS Externally apply 1 pad topically 4 times daily 100 each 11     amLODIPine (NORVASC) 10 MG tablet Take 1 tablet (10 mg) by mouth daily 90 tablet 3     ASPIR-LOW 81 MG EC tablet TAKE 1 TABLET BY MOUTH EVERY  tablet 2     atorvastatin (LIPITOR) 40 MG tablet Take 1 tablet (40 mg) by mouth daily 90 tablet 1     blood glucose monitoring (FREESTYLE LITE) test strip TEST BLOOD SUGAR TWO TIMES A DAY 50 strip 12     blood glucose monitoring (FREESTYLE) lancets Test BS two times daily as directed 100 each 1     blood glucose monitoring (NO BRAND SPECIFIED) meter device kit Use to test blood sugar 2 times daily or as directed. Preferred blood glucose meter OR supplies to accompany: Blood Glucose Monitor Brands: per insurance. 1 kit 0     Blood Pressure Monitor KIT Automatic Blood Pressure Monitor 1 kit 0     Cholecalciferol (VITAMIN D) 2000 units tablet Take 2,000 Units by mouth daily 90 tablet 3     docusate sodium (COLACE) 100 MG capsule Take 1 capsule (100 mg) by mouth 2 times daily 60 capsule 4     Emollient (EUCERIN CALMING DAILY MOIST) CREA Externally apply 1 dose * topically daily 1 Tube 12     ergocalciferol (ERGOCALCIFEROL) 35982 units capsule Take 1 capsule (50,000 Units) by mouth once a week 8 capsule 0     fluticasone  (FLONASE) 50 MCG/ACT spray Spray 1-2 sprays into both nostrils daily 1 Bottle 1     furosemide (LASIX) 40 MG tablet Take 1.5 tablets (60 mg) by mouth daily 90 tablet 3     hydrALAZINE (APRESOLINE) 25 MG tablet Take 1 tablet (25 mg) by mouth 2 times daily       insulin glargine (LANTUS SOLOSTAR) 100 UNIT/ML pen Inject 8 Units Subcutaneous every morning 15 mL 3     insulin pen needle 31G X 6 MM Use as directed 120 each 3     levothyroxine (SYNTHROID/LEVOTHROID) 200 MCG tablet Take 1 tablet (200 mcg) by mouth daily 90 tablet 0     metoprolol succinate (TOPROL-XL) 25 MG 24 hr tablet Take 1 tablet (25 mg) by mouth daily 90 tablet 3     mometasone-formoterol (DULERA) 100-5 MCG/ACT oral inhaler Inhale 2 puffs into the lungs 2 times daily 1 Inhaler 1     nicotine polacrilex (COMMIT) 2 MG lozenge Dissolve 1 lozenge orally every 4 hours as directed. 100 lozenge 2     nitroGLYcerin (NITROSTAT) 0.4 MG sublingual tablet Place 1 tablet (0.4 mg) under the tongue every 5 minutes as needed for chest pain 25 tablet 0     nystatin (MYCOSTATIN) 885710 UNIT/GM POWD Apply 1 g topically 3 times daily as needed 30 g 1     order for DME Equipment being ordered: Diabetic Shoes 1 each 0     order for DME Equipment being ordered: two pairs moderate knee high support hose 2 Device 1     order for DME Equipment being ordered: Compression socks.  Strength:15-20 mmHg 2 each 0     order for DME One wheeled walker with seat and brakes and basket 1 Device 0     oxyCODONE IR (ROXICODONE) 10 MG tablet Take 1 tablet (10 mg) by mouth every 8 hours as needed 90 tablet 0     polyethylene glycol (MIRALAX) powder Take 17 g (1 capful) by mouth daily as needed for constipation 510 g 2     sodium bicarbonate 325 MG tablet Take 2 tablets (650 mg) by mouth 2 times daily 360 tablet 3     tiotropium (SPIRIVA HANDIHALER) 18 MCG capsule Inhale contents of one capsule daily. 90 capsule 3     Allergies   Allergen Reactions     Contrast Dye Hives and Itching      Clonidine      She had as IP and thinks it made her itchy     Diatrizoate Other (See Comments)     Diltiazem      Severe bradycardia     Iodine-131      Recent Labs   Lab Test  08/03/18   0859  07/26/18   1116  05/16/18   1030   01/16/18   1055   10/25/17   0639  10/20/17   1626   09/07/17   1135  05/10/17   1025   11/02/16   0622   A1C   --   5.8*   --    --   6.0   --   6.6*   --    --   6.4*  6.6*   < >   --    LDL   --    --    --    --    --    --   104*   --    --   81  96   --    --    HDL   --    --    --    --    --    --   67   --    --   75  66   --    --    TRIG   --    --    --    --    --    --   212*   --    --   283*  303*   --    --    ALT   --    --    --    --    --    --   16   --    --   18   --    --   21   CR  3.88*   --   3.86*   < >  3.34*   < >  2.77*   --    < >  2.70*  2.25*   < >  2.64*   GFRESTIMATED  11*   --   11*   < >  14*   < >  17*   --    < >  17*  21*   < >  18*   GFRESTBLACK  14*   --   14*   < >  16*   < >  20*   --    < >  21*  26*   < >  22*   POTASSIUM  4.9   --   5.3   < >  4.8   < >  4.3   --    < >  6.2*  5.5*   < >  5.6*   TSH   --   1.12   --    --    --    --    --   1.04   --   71.77*   --    < >   --     < > = values in this interval not displayed.      BP Readings from Last 3 Encounters:   08/16/18 164/82   08/03/18 183/77   07/26/18 138/68    Wt Readings from Last 3 Encounters:   08/03/18 161 lb (73 kg)   07/26/18 167 lb 8 oz (76 kg)   06/25/18 168 lb (76.2 kg)        Labs reviewed in EPIC  Problem list, Medication list, Allergies, and Medical/Social/Surgical histories reviewed in Psychiatric and updated as appropriate.     ROS: Constitutional, neuro, ENT, endocrine, pulmonary, cardiac, gastrointestinal, genitourinary, musculoskeletal, integument and psychiatric systems are negative, except as otherwise noted above in the HPI.     OBJECTIVE:                                                    /64 (BP Location: Left arm)  Pulse 68  Temp 98.2  F (36.8  C) (Oral)   Resp 16  Wt 160 lb (72.6 kg)  BMI 25.82 kg/m2  Body mass index is 25.82 kg/(m^2).  GENERAL: healthy, alert, well nourished, well hydrated, no distress  EYES: Eyes grossly normal to inspection, extraocular movements - intact.  NECK: no tenderness, no adenopathy, no asymmetry, no masses, no stiffness.  RESP: lungs clear to auscultation - no rales, no rhonchi, no wheezes  CV: regular rates and rhythm, normal S1 S2, no S3 or S4 and no murmur, no click or rub  MS: extremities- no gross deformities noted, no edema  NEURO: strength and tone- normal, sensory exam- grossly normal, mentation- intact, speech- normal, reflexes- symmetric  Non focal no aphasia. No facial asymmetry.   BACK: no CVA tenderness, no paralumbar tenderness  LYMPHATICS: ant. cervical- normal, post. cervical- normal, supraclavicular- normal.  Mental Status Assessment:  Appearance:   Appropriate   Eye Contact:   Good   Psychomotor Behavior: Normal   Attitude:   Cooperative   Orientation:   All  Speech   Rate / Production: Normal    Volume:  Normal   Mood:    Normal  Affect:    Appropriate   Thought Content:  Clear   Thought Form:  Coherent  Logical   Insight:    Fair   Attention Span and Concentration:  appropriate  Recent and Remote Memory:  intact  Fund of Knowledge: appropriate  Muscle Strength and Tone: normal     See [facility] notes     Diagnostic Test Results:  Results for orders placed or performed in visit on 08/03/18   Renal panel   Result Value Ref Range    Sodium 144 133 - 144 mmol/L    Potassium 4.9 3.4 - 5.3 mmol/L    Chloride 116 (H) 94 - 109 mmol/L    Carbon Dioxide 19 (L) 20 - 32 mmol/L    Anion Gap 9 3 - 14 mmol/L    Glucose 94 70 - 99 mg/dL    Urea Nitrogen 37 (H) 7 - 30 mg/dL    Creatinine 3.88 (H) 0.52 - 1.04 mg/dL    GFR Estimate 11 (L) >60 mL/min/1.7m2    GFR Estimate If Black 14 (L) >60 mL/min/1.7m2    Calcium 7.7 (L) 8.5 - 10.1 mg/dL    Phosphorus 4.7 (H) 2.5 - 4.5 mg/dL    Albumin 1.9 (L) 3.4 - 5.0 g/dL   CBC with platelets   Result  Value Ref Range    WBC 7.7 4.0 - 11.0 10e9/L    RBC Count 4.20 3.8 - 5.2 10e12/L    Hemoglobin 11.9 11.7 - 15.7 g/dL    Hematocrit 39.1 35.0 - 47.0 %    MCV 93 78 - 100 fl    MCH 28.3 26.5 - 33.0 pg    MCHC 30.4 (L) 31.5 - 36.5 g/dL    RDW 13.9 10.0 - 15.0 %    Platelet Count 305 150 - 450 10e9/L   Parathyroid Hormone Intact   Result Value Ref Range    Parathyroid Hormone Intact 486 (H) 18 - 80 pg/mL   Vitamin D Deficiency   Result Value Ref Range    Vitamin D Deficiency screening 11 (L) 20 - 75 ug/L     *Note: Due to a large number of results and/or encounters for the requested time period, some results have not been displayed. A complete set of results can be found in Results Review.        ASSESSMENT/PLAN:                                                    (M54.5,  G89.29) Chronic low back pain without sciatica, unspecified back pain laterality  (primary encounter diagnosis)  Comment: Stable on pain medication.   Plan: oxyCODONE IR (ROXICODONE) 10 MG tablet,         CANCELED: Drug Abuse Screen Panel 13, Urine         (Pain Care Package)        Refill done.    (I10) Hypertension goal BP (blood pressure) < 130/80  Comment: improved from the last visit. Denies any symptoms.   BP Readings from Last 3 Encounters:   08/30/18 120/64   08/16/18 164/82   08/03/18 183/77     Plan: Encouraged patient to set a reminder for her evening dose medications.     (E11.22,  N18.4,  Z79.4) Type 2 diabetes mellitus with stage 4 chronic kidney disease, with long-term current use of insulin (H)  Comment: Stable. Denies any up or downs with her blood sugars.   Plan: Continue with Lantus at 8 units daily.   -Will follow-up with MTM at the next visit.         Patient Instructions   -Refills done.   -Call clinic with any questions or concerns.     I spent 25 min spent in direct face to face time with Kim Anne, greater than 50% in counseling and coordination of care for:  Chronic pain, Diabetes and Hypertension.       Ailyn  JUNIOR [facility]

## 2018-08-30 NOTE — MR AVS SNAPSHOT
After Visit Summary   8/30/2018    Kim Anne    MRN: [id number]           Patient Information     Date Of Birth          1945        Visit Information        Provider Department      8/30/2018 10:30 AM Ailyn Nieves APRN CNP [facility]        Today's Diagnoses     Chronic low back pain without sciatica, unspecified back pain laterality    -  1      Care Instructions    -Refills done.   -Call clinic with any questions or concerns.           Follow-ups after your visit        Follow-up notes from your care team     Return in about 4 weeks (around 9/27/2018) for Chronic pain, Diabetes.      Your next 10 appointments already scheduled     Sep 11, 2018  3:00 PM CDT   (Arrive by 2:45 PM)   New Patient Visit with Michelle Jackson MD   [facility] ([facility])    [address] [place] [address]  [address]  [facility] [id number] [phone]            Nov 16, 2018  8:45 AM CST   Lab with  LAB   [facility] ([facility])    [address] [place] [address]  1st Floor  [facility] [id number] [phone]            Nov 16, 2018  9:40 AM CST   (Arrive by 9:10 AM)   Return Visit with Bhargav Lopes MD   [facility] Nephrology ([facility])    [address] [place] [address]  [address]  [facility] [id number] [phone]              Who to contact     If you have questions or need follow up information about today's clinic visit or your schedule please contact [facility] directly at [phone].  Normal or non-critical lab and imaging results will be communicated to you by MyChart, letter or phone within 4 business days after the clinic has received the results. If you do not hear from us within 7 days, please contact the clinic through MyChart or phone. If you have a critical or abnormal lab result, we will notify you by phone as soon as  "possible.  Submit refill requests through [employer] or call your pharmacy and they will forward the refill request to us. Please allow 3 business days for your refill to be completed.          Additional Information About Your Visit        [employer] Information     [employer] lets you send messages to your doctor, view your test results, renew your prescriptions, schedule appointments and more. To sign up, go to [email or web address] . Click on \"Log in\" on the left side of the screen, which will take you to the Welcome page. Then click on \"Sign up Now\" on the right side of the page.     You will be asked to enter the access code listed below, as well as some personal information. Please follow the directions to create your username and password.     Your access code is: [email or web address]  Expires: 2018  6:30 AM     Your access code will  in 90 days. If you need help or a new code, please call your [place] clinic or [phone].        Care EveryWhere ID     This is your Care EveryWhere ID. This could be used by other organizations to access your [place] medical records  [id number]        Your Vitals Were     Pulse Temperature Respirations BMI (Body Mass Index)          68 98.2  F (36.8  C) (Oral) 16 25.82 kg/m2         Blood Pressure from Last 3 Encounters:   18 120/64   18 164/82   18 183/77    Weight from Last 3 Encounters:   18 160 lb (72.6 kg)   18 161 lb (73 kg)   18 167 lb 8 oz (76 kg)              We Performed the Following     Drug Abuse Screen Panel 13, Urine (Pain Care Package)          Where to get your medicines      Some of these will need a paper prescription and others can be bought over the counter.  Ask your nurse if you have questions.     Bring a paper prescription for each of these medications     oxyCODONE IR 10 MG tablet         Information about OPIOIDS     PRESCRIPTION OPIOIDS: WHAT YOU NEED TO KNOW   We gave you an opioid (narcotic) pain medicine. It " is important to manage your pain, but opioids are not always the best choice. You should first try all the other options your care team gave you. Take this medicine for as short a time (and as few doses) as possible.    Some activities can increase your pain, such as bandage changes or therapy sessions. It may help to take your pain medicine 30 to 60 minutes before these activities. Reduce your stress by getting enough sleep, working on hobbies you enjoy and practicing relaxation or meditation. Talk to your care team about ways to manage your pain beyond prescription opioids.    These medicines have risks:    DO NOT drive when on new or higher doses of pain medicine. These medicines can affect your alertness and reaction times, and you could be arrested for driving under the influence (DUI). If you need to use opioids long-term, talk to your care team about driving.    DO NOT operate heavy machinery    DO NOT do any other dangerous activities while taking these medicines.    DO NOT drink any alcohol while taking these medicines.     If the opioid prescribed includes acetaminophen, DO NOT take with any other medicines that contain acetaminophen. Read all labels carefully. Look for the word  acetaminophen  or  Tylenol.  Ask your pharmacist if you have questions or are unsure.    You can get addicted to pain medicines, especially if you have a history of addiction (chemical, alcohol or substance dependence). Talk to your care team about ways to reduce this risk.    All opioids tend to cause constipation. Drink plenty of water and eat foods that have a lot of fiber, such as fruits, vegetables, prune juice, apple juice and high-fiber cereal. Take a laxative (Miralax, milk of magnesia, Colace, Senna) if you don t move your bowels at least every other day. Other side effects include upset stomach, sleepiness, dizziness, throwing up, tolerance (needing more of the medicine to have the same effect), physical dependence and  slowed breathing.    Store your pills in a secure place, locked if possible. We will not replace any lost or stolen medicine. If you don t finish your medicine, please throw away (dispose) as directed by your pharmacist. The [place] [employer]ution [employer] has more information about safe disposal: [email or web address]         Primary Care Provider Office Phone # Fax #    JUNIOR Bishop [phone]       [address] [facility] [[email or web address]] [address]  [facility] [address]        Equal Access to Services     [facility]: Hadii aad ku hadasho Soomaali, waaxda luqadaha, qaybta kaalmada adeegyada, kassandra villa adefermin white . So [facility] [phone].    ATENCIÓN: Si habla español, tiene a bailey disposición servicios gratuitos de asistencia lingüística. Vicenteame al [phone].    We comply with applicable federal civil rights laws and [place] laws. We do not discriminate on the basis of race, color, national origin, age, disability, sex, sexual orientation, or gender identity.            Thank you!     Thank you for choosing [facility] [place] [facility]  for your care. Our goal is always to provide you with excellent care. Hearing back from our patients is one way we can continue to improve our services. Please take a few minutes to complete the written survey that you may receive in the mail after your visit with us. Thank you!             Your Updated Medication List - Protect others around you: Learn how to safely use, store and throw away your medicines at www.disposemymeds.org.          This list is accurate as of 8/30/18 10:45 AM.  Always use your most recent med list.                   Brand Name Dispense Instructions for use Diagnosis    albuterol 108 (90 Base) MCG/ACT inhaler    PROAIR HFA/PROVENTIL HFA/VENTOLIN HFA    18 g    Inhale 2 puffs into the lungs every 6 hours as needed for shortness of breath / dyspnea or wheezing    Chronic  obstructive pulmonary disease, unspecified COPD type (H)       amLODIPine 10 MG tablet    NORVASC    90 tablet    Take 1 tablet (10 mg) by mouth daily    Renovascular hypertension       ASPIR-LOW 81 MG EC tablet   Generic drug:  aspirin     120 tablet    TAKE 1 TABLET BY MOUTH EVERY DAY    History of MI (myocardial infarction), Essential hypertension, benign       atorvastatin 40 MG tablet    LIPITOR    90 tablet    Take 1 tablet (40 mg) by mouth daily    Hyperlipidemia LDL goal <100       blood glucose monitoring lancets     100 each    Test BS two times daily as directed    Type 2 diabetes mellitus without complication, with long-term current use of insulin (H)       blood glucose monitoring meter device kit    no brand specified    1 kit    Use to test blood sugar 2 times daily or as directed. Preferred blood glucose meter OR supplies to accompany: Blood Glucose Monitor Brands: per insurance.    Type 2 diabetes mellitus without complication, with long-term current use of insulin (H)       blood glucose monitoring test strip    FREESTYLE LITE    50 strip    TEST BLOOD SUGAR TWO TIMES A DAY    Type 2 diabetes mellitus without complication, with long-term current use of insulin (H)       Blood Pressure Monitor Kit     1 kit    Automatic Blood Pressure Monitor    Renovascular hypertension       docusate sodium 100 MG capsule    COLACE    60 capsule    Take 1 capsule (100 mg) by mouth 2 times daily    Constipation, unspecified constipation type       ergocalciferol 82405 units capsule    ERGOCALCIFEROL    8 capsule    Take 1 capsule (50,000 Units) by mouth once a week    Vitamin D deficiency disease       EUCERIN CALMING DAILY MOIST Crea     1 Tube    Externally apply 1 dose * topically daily    Type II or unspecified type diabetes mellitus with neurological manifestations, not stated as uncontrolled(250.60) (H)       ferrous sulfate 325 (65 Fe) MG tablet    IRON          fluticasone 50 MCG/ACT spray    FLONASE    1  Bottle    Spray 1-2 sprays into both nostrils daily    Acute nasopharyngitis       furosemide 40 MG tablet    LASIX    90 tablet    Take 1.5 tablets (60 mg) by mouth daily    Hyperkalemia, Edema, unspecified type       * hydrALAZINE 50 MG tablet    APRESOLINE          * hydrALAZINE 25 MG tablet    APRESOLINE     Take 1 tablet (25 mg) by mouth 2 times daily    Essential hypertension, Chronic kidney disease, stage 4 (severe) (H)       insulin glargine 100 UNIT/ML injection    LANTUS    15 mL    Inject 8 Units Subcutaneous every morning    Controlled type 2 diabetes mellitus with stage 4 chronic kidney disease, with long-term current use of insulin (H)       * insulin pen needle 31G X 6 MM     120 each    Use as directed    Type 2 diabetes mellitus without complication, with long-term current use of insulin (H)       * TECHLITE PEN NEEDLES 32G X 4 MM   Generic drug:  insulin pen needle           levothyroxine 200 MCG tablet    SYNTHROID/LEVOTHROID    90 tablet    Take 1 tablet (200 mcg) by mouth daily    Other specified hypothyroidism       metoprolol succinate 25 MG 24 hr tablet    TOPROL-XL    90 tablet    Take 1 tablet (25 mg) by mouth daily    Hypertension associated with diabetes (H)       mometasone-formoterol 100-5 MCG/ACT oral inhaler    DULERA    1 Inhaler    Inhale 2 puffs into the lungs 2 times daily    COPD (chronic obstructive pulmonary disease) (H)       nicotine polacrilex 2 MG lozenge    COMMIT    100 lozenge    Dissolve 1 lozenge orally every 4 hours as directed.    Chronic obstructive pulmonary disease, unspecified COPD type (H)       nitroGLYcerin 0.4 MG sublingual tablet    NITROSTAT    25 tablet    Place 1 tablet (0.4 mg) under the tongue every 5 minutes as needed for chest pain    History of MI (myocardial infarction)       nystatin 189178 UNIT/GM Powd    MYCOSTATIN    30 g    Apply 1 g topically 3 times daily as needed    Groin rash       * order for DME     1 Device    One wheeled walker with  seat and brakes and basket    Risk for falls       * order for DME     2 each    Equipment being ordered: Compression socks. Strength:15-20 mmHg    Localized edema       order for DME     1 each    Equipment being ordered: Diabetic Shoes    Type 2 diabetes mellitus with stage 5 chronic kidney disease not on chronic dialysis, without long-term current use of insulin (H)       order for DME     2 Device    Equipment being ordered: two pairs moderate knee high support hose    Edema, unspecified type       oxyCODONE IR 10 MG tablet    ROXICODONE    90 tablet    Take 1 tablet (10 mg) by mouth every 8 hours as needed    Chronic low back pain without sciatica, unspecified back pain laterality       polyethylene glycol powder    MIRALAX    510 g    Take 17 g (1 capful) by mouth daily as needed for constipation    Slow transit constipation       SM ALCOHOL PREP 70 % Pads     100 each    Externally apply 1 pad topically 4 times daily    Type 2 diabetes mellitus without complication, with long-term current use of insulin (H)       * sodium bicarbonate 325 MG tablet     360 tablet    Take 2 tablets (650 mg) by mouth 2 times daily    Acidosis, CKD (chronic kidney disease) stage 4, GFR 15-29 ml/min (H)       * sodium bicarbonate 650 MG tablet           tiotropium 18 MCG capsule    SPIRIVA HANDIHALER    90 capsule    Inhale contents of one capsule daily.    Pulmonary emphysema, unspecified emphysema type (H)       vitamin D 2000 units tablet     90 tablet    Take 2,000 Units by mouth daily    Vitamin D deficiency disease       * Notice:  This list has 8 medication(s) that are the same as other medications prescribed for you. Read the directions carefully, and ask your doctor or other care provider to review them with you.

## 2018-08-30 NOTE — MR AVS SNAPSHOT
After Visit Summary   8/30/2018    Kim Anne    MRN: [id number]           Patient Information     Date Of Birth          1945        Visit Information        Provider Department      8/30/2018 10:30 AM Aviva Lackey LICSW [facility]        Today's Diagnoses     Grief    -  1    JUSTINE (generalized anxiety disorder)           Follow-ups after your visit        Your next 10 appointments already scheduled     Sep 11, 2018  3:00 PM CDT   (Arrive by 2:45 PM)   New Patient Visit with Michelle Jackson MD   [facility] ([facility])    [address]  [address]  [facility] [address] [phone]            Sep 28, 2018 10:30 AM CDT   Return Visit with JUNIOR Bishop [facility] ([facility])    [address]  [address]  [facility] [id number] [phone]            Sep 28, 2018 10:30 AM CDT   Return Visit with LAMINE Chahal   [facility] ([facility])    [address]  [address]  [facility] [id number] [phone]            Nov 16, 2018  8:45 AM CST   Lab with  LAB   [facility] ([facility])    [address]  1st Floor  [facility] [id number] [phone]            Nov 16, 2018  9:40 AM CST   (Arrive by 9:10 AM)   Return Visit with Bhargav Lopes MD   [facility] ([facility])    [address]  [address]  [facility] [id number] [phone]              Who to contact     If you have questions or need follow up information about today's clinic visit or your schedule please contact [facility] directly at [phone].  Normal or non-critical lab and imaging results will be communicated to you by MyChart, letter or phone  "within 4 business days after the clinic has received the results. If you do not hear from us within 7 days, please contact the clinic through Sonos or phone. If you have a critical or abnormal lab result, we will notify you by phone as soon as possible.  Submit refill requests through Sonos or call your pharmacy and they will forward the refill request to us. Please allow 3 business days for your refill to be completed.          Additional Information About Your Visit        Sonos Information     Sonos lets you send messages to your doctor, view your test results, renew your prescriptions, schedule appointments and more. To sign up, go to [email or web address] . Click on \"Log in\" on the left side of the screen, which will take you to the Welcome page. Then click on \"Sign up Now\" on the right side of the page.     You will be asked to enter the access code listed below, as well as some personal information. Please follow the directions to create your username and password.     Your access code is: [email or web address]  Expires: 2018  6:30 AM     Your access code will  in 90 days. If you need help or a new code, please call your [place] clinic or [phone].        Care EveryWhere ID     This is your Care EveryWhere ID. This could be used by other organizations to access your [place] medical records  [id number]         Blood Pressure from Last 3 Encounters:   18 120/64   18 164/82   18 183/77    Weight from Last 3 Encounters:   18 160 lb (72.6 kg)   18 161 lb (73 kg)   18 167 lb 8 oz (76 kg)              Today, you had the following     No orders found for display         Where to get your medicines      Some of these will need a paper prescription and others can be bought over the counter.  Ask your nurse if you have questions.     Bring a paper prescription for each of these medications     oxyCODONE IR 10 MG tablet          Primary Care Provider Office Phone # " Fax #    Ailyn Nieves, APRN -[phone]       [address] [facility] [address]  [facility] [address]        Equal Access to Services     CECIL TOLLIVER : Hadgaurav alysha redd chango Sofranciscaali, waaxda luqadaha, qaybta kaalmada adejaclynda, kassandra pendleton laYrisanastasiia shoemaker. So [facility] [phone].    ATENCIÓN: Si habla español, tiene a bailey disposición servicios gratuitos de asistencia lingüística. Llame al [phone].    We comply with applicable federal civil rights laws and [place] laws. We do not discriminate on the basis of race, color, national origin, age, disability, sex, sexual orientation, or gender identity.            Thank you!     Thank you for choosing [facility]  for your care. Our goal is always to provide you with excellent care. Hearing back from our patients is one way we can continue to improve our services. Please take a few minutes to complete the written survey that you may receive in the mail after your visit with us. Thank you!             Your Updated Medication List - Protect others around you: Learn how to safely use, store and throw away your medicines at www.disposemymeds.org.          This list is accurate as of 8/30/18 11:19 AM.  Always use your most recent med list.                   Brand Name Dispense Instructions for use Diagnosis    albuterol 108 (90 Base) MCG/ACT inhaler    PROAIR HFA/PROVENTIL HFA/VENTOLIN HFA    18 g    Inhale 2 puffs into the lungs every 6 hours as needed for shortness of breath / dyspnea or wheezing    Chronic obstructive pulmonary disease, unspecified COPD type (H)       amLODIPine 10 MG tablet    NORVASC    90 tablet    Take 1 tablet (10 mg) by mouth daily    Renovascular hypertension       ASPIR-LOW 81 MG EC tablet   Generic drug:  aspirin     120 tablet    TAKE 1 TABLET BY MOUTH EVERY DAY    History of MI (myocardial infarction), Essential hypertension, benign       atorvastatin 40 MG tablet    LIPITOR    90 tablet     Take 1 tablet (40 mg) by mouth daily    Hyperlipidemia LDL goal <100       blood glucose monitoring lancets     100 each    Test BS two times daily as directed    Type 2 diabetes mellitus without complication, with long-term current use of insulin (H)       blood glucose monitoring meter device kit    no brand specified    1 kit    Use to test blood sugar 2 times daily or as directed. Preferred blood glucose meter OR supplies to accompany: Blood Glucose Monitor Brands: per insurance.    Type 2 diabetes mellitus without complication, with long-term current use of insulin (H)       blood glucose monitoring test strip    FREESTYLE LITE    50 strip    TEST BLOOD SUGAR TWO TIMES A DAY    Type 2 diabetes mellitus without complication, with long-term current use of insulin (H)       Blood Pressure Monitor Kit     1 kit    Automatic Blood Pressure Monitor    Renovascular hypertension       docusate sodium 100 MG capsule    COLACE    60 capsule    Take 1 capsule (100 mg) by mouth 2 times daily    Constipation, unspecified constipation type       ergocalciferol 12412 units capsule    ERGOCALCIFEROL    8 capsule    Take 1 capsule (50,000 Units) by mouth once a week    Vitamin D deficiency disease       EUCERIN CALMING DAILY MOIST Crea     1 Tube    Externally apply 1 dose * topically daily    Type II or unspecified type diabetes mellitus with neurological manifestations, not stated as uncontrolled(250.60) (H)       ferrous sulfate 325 (65 Fe) MG tablet    IRON          fluticasone 50 MCG/ACT spray    FLONASE    1 Bottle    Spray 1-2 sprays into both nostrils daily    Acute nasopharyngitis       furosemide 40 MG tablet    LASIX    90 tablet    Take 1.5 tablets (60 mg) by mouth daily    Hyperkalemia, Edema, unspecified type       * hydrALAZINE 50 MG tablet    APRESOLINE          * hydrALAZINE 25 MG tablet    APRESOLINE     Take 1 tablet (25 mg) by mouth 2 times daily    Essential hypertension, Chronic kidney disease, stage 4  (severe) (H)       insulin glargine 100 UNIT/ML injection    LANTUS    15 mL    Inject 8 Units Subcutaneous every morning    Controlled type 2 diabetes mellitus with stage 4 chronic kidney disease, with long-term current use of insulin (H)       * insulin pen needle 31G X 6 MM     120 each    Use as directed    Type 2 diabetes mellitus without complication, with long-term current use of insulin (H)       * TECHLITE PEN NEEDLES 32G X 4 MM   Generic drug:  insulin pen needle           levothyroxine 200 MCG tablet    SYNTHROID/LEVOTHROID    90 tablet    Take 1 tablet (200 mcg) by mouth daily    Other specified hypothyroidism       metoprolol succinate 25 MG 24 hr tablet    TOPROL-XL    90 tablet    Take 1 tablet (25 mg) by mouth daily    Hypertension associated with diabetes (H)       mometasone-formoterol 100-5 MCG/ACT oral inhaler    DULERA    1 Inhaler    Inhale 2 puffs into the lungs 2 times daily    COPD (chronic obstructive pulmonary disease) (H)       nicotine polacrilex 2 MG lozenge    COMMIT    100 lozenge    Dissolve 1 lozenge orally every 4 hours as directed.    Chronic obstructive pulmonary disease, unspecified COPD type (H)       nitroGLYcerin 0.4 MG sublingual tablet    NITROSTAT    25 tablet    Place 1 tablet (0.4 mg) under the tongue every 5 minutes as needed for chest pain    History of MI (myocardial infarction)       nystatin 015234 UNIT/GM Powd    MYCOSTATIN    30 g    Apply 1 g topically 3 times daily as needed    Groin rash       * order for DME     1 Device    One wheeled walker with seat and brakes and basket    Risk for falls       * order for DME     2 each    Equipment being ordered: Compression socks. Strength:15-20 mmHg    Localized edema       order for DME     1 each    Equipment being ordered: Diabetic Shoes    Type 2 diabetes mellitus with stage 5 chronic kidney disease not on chronic dialysis, without long-term current use of insulin (H)       order for DME     2 Device    Equipment  being ordered: two pairs moderate knee high support hose    Edema, unspecified type       oxyCODONE IR 10 MG tablet    ROXICODONE    90 tablet    Take 1 tablet (10 mg) by mouth every 8 hours as needed    Chronic low back pain without sciatica, unspecified back pain laterality       polyethylene glycol powder    MIRALAX    510 g    Take 17 g (1 capful) by mouth daily as needed for constipation    Slow transit constipation       SM ALCOHOL PREP 70 % Pads     100 each    Externally apply 1 pad topically 4 times daily    Type 2 diabetes mellitus without complication, with long-term current use of insulin (H)       * sodium bicarbonate 325 MG tablet     360 tablet    Take 2 tablets (650 mg) by mouth 2 times daily    Acidosis, CKD (chronic kidney disease) stage 4, GFR 15-29 ml/min (H)       * sodium bicarbonate 650 MG tablet           tiotropium 18 MCG capsule    SPIRIVA HANDIHALER    90 capsule    Inhale contents of one capsule daily.    Pulmonary emphysema, unspecified emphysema type (H)       vitamin D 2000 units tablet     90 tablet    Take 2,000 Units by mouth daily    Vitamin D deficiency disease       * Notice:  This list has 8 medication(s) that are the same as other medications prescribed for you. Read the directions carefully, and ask your doctor or other care provider to review them with you.
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
## 2018-09-07 ENCOUNTER — PATIENT OUTREACH (OUTPATIENT)
Dept: GERIATRIC MEDICINE | Facility: CLINIC | Age: 73
End: 2018-09-07

## 2018-09-07 NOTE — PROGRESS NOTES
Per member, clinic called and rescheduled 9/11 appointment date and time. CMS cancelled the ride for 9/11/18.    Brianna Barrios  Case Management Specialist  [facility]   [phone]

## 2018-09-07 NOTE — PROGRESS NOTES
[facility] Care Coordination Contact  Arranged transportation thru [facility] for the below appt:  Appt Date & Time: 9/11 @ 3:00 pm  Clinic Name & Address:  [address] [address]  Transportation Provider: [employer]   time:  2-2:30 pm    Arranged transportation thru [facility] PAR for the below appt:  Appt Date & Time: 9/28 @ 10:30 am  Clinic [facility] & Address:   [facility]  Transportation Provider: [employer]   time:  9:30-10:00 am    Notified Kim of  time.    Brianna Barrios  Case Management Specialist  [facility]   [phone]

## 2018-09-10 NOTE — PROGRESS NOTES
Ride rescheduled for 10/5 @ 1:00pm.     Set ride up with [employer].     Justyna Mccain  Case Management Specialist  [facility]  [phone]

## 2018-09-11 ENCOUNTER — CARE COORDINATION (OUTPATIENT)
Dept: NEPHROLOGY | Facility: CLINIC | Age: 73
End: 2018-09-11

## 2018-09-11 NOTE — PROGRESS NOTES
Attempted to reach patient for CKD 5 follow up/ screen for uremic symptoms.  Left VM for patient to return call.    Ayush Oneal, RN   Nephrology RN Care Coordinator

## 2018-09-14 NOTE — PROGRESS NOTES
Attempted to reach patient again. No answer and call did not go to voicemail. Will try again next week.    Ayush Oneal RN

## 2018-09-17 NOTE — PROGRESS NOTES
[facility] Care Coordination Contact  Attempt to reach member to schedule annual assessment.  Unable to reach member.  Unable to leave voicemail.  Will proceed with unable to contact for annual home visit.   Batool Mai RN, BSN, PHN  [facility]  [phone]  Fax: [phone]     Detail Level: Detailed

## 2018-09-18 ENCOUNTER — PATIENT OUTREACH (OUTPATIENT)
Dept: GERIATRIC MEDICINE | Facility: CLINIC | Age: 73
End: 2018-09-18

## 2018-09-18 NOTE — PROGRESS NOTES
"[facility] Care Coordination Contact  Per CC, mailed client an \"Unable to Contact\" letter.    Luci Godinez  CMS Supervisor  [facility]  [phone]      "

## 2018-09-18 NOTE — LETTER
September 18, 2018    KIM CHAVEZ  [address]  [facility] [address]    Dear Kim,     I have been unable to reach you by telephone. I am writing to ask you or a family member to call me at [phone]. If you reach my voicemail, please leave a message with your daytime telephone number and a date and time that I can call you. If you are hearing impaired, please call the [place] Relay at 496 or [phone] (kbldlo-[id number] relay service).     The reason I am trying to reach you is:    [] Six (6) month check-in  [x] To schedule your annual assessment  [] Other:        Please call me as soon as you receive this letter. I look forward to speaking with you.    Sincerely,      Batool Mai RN, N  [employer] [facility] [employer]  [phone]  [email or web address]    [facility] is a health plan that contracts with both Medicare and the [place] Medical Assistance (Medicaid) program to provide benefits of both programs to enrollees. Enrollment in [facility] depends on contract renewal.    MSC+ [id number] IA ([id number])      (11/16)

## 2018-09-24 NOTE — PROGRESS NOTES
[facility] Care Coordination Contact  Per CC, continues to be unable to contact member for assessment, MMIS entered.    Luci Godinez  Care Management Specialist Supervisor  [facility]  [phone]

## 2018-09-27 NOTE — PROGRESS NOTES
SUBJECTIVE:                                                    Kim Anne is a 73 year old  female who presents to clinic today for the following health issues:  [facility]: [facility]    Patient reports that her grandson got into a bad accident and is on life support in [place]. Patient is planning to go and visit him over the weekend.     Otherwise reports that everything has been fine.     Diabetes Follow-up  Reports blood sugars; 90's -100's. Patient states that she stopped taking her Lantus about 2 weeks ago. Feels like she is healthy and does not need to be on her insulin at this time. Discussed the importance of maintaining her medications as prescribed and gently weaning off while monitoring her levels.     Patient is checking blood sugars: no recently let her blood sugar testing kit at her sister's house    Diabetic concerns: None     Symptoms of hypoglycemia (low blood sugar): none     Paresthesias (numbness or burning in feet) or sores: Yes numbness, burning sometimes     Date of last diabetic eye exam: couple months ago    BP Readings from Last 2 Encounters:   08/30/18 120/64   08/16/18 164/82     Hemoglobin A1C (%)   Date Value   07/26/2018 5.8 (H)   01/16/2018 6.0     LDL Cholesterol Calculated (mg/dL)   Date Value   10/25/2017 104 (H)   09/07/2017 81     Diabetes Management Resources      Chronic Pain Follow-Up    Type / Location of Pain: low back  Analgesia/pain control:       Recent changes:  Same, sometimes good sometimes bad it varies      Overall control: varies  Activity level/function:      Daily activities:  Able to do moderate activities    Work:  not applicable  Adverse effects:  No  Adherance    Taking medication as directed?  Yes    Participating in other treatments: no   Risk Factors:    Sleep:  Fair    Mood/anxiety:  slightly worsened    Recent family or social stressors:  none noted    Other aggravating factors: prolonged standing and walkiing long distance  PHQ-9  SCORE 2/12/2018 3/12/2018 6/25/2018   Total Score - - -   Total Score - - -   Total Score 0 0 0     JUSTINE-7 SCORE 12/12/2016 3/12/2018 6/25/2018   Total Score - - -   Total Score 0 0 0   Total Score - - -     Encounter-Level CSA - 04/10/2018:          Controlled Substance Agreement - Scan on 4/18/2018  3:13 PM : CONTROLLED SUBSTANCE AGREEMENT (below)            Encounter-Level CSA - 04/10/2018:          Controlled Substance Agreement - Scan on 2/16/2017  2:30 PM : CONTROLLED SUBSTANCE AGREEMENT (below)            Encounter-Level CSA - 04/10/2018:          Controlled Substance Agreement - Scan on 1/16/2017  6:55 PM : CONTROLLED SUBSTANCE AGREEMENT (below)            Encounter-Level CSA - 04/10/2018:          Controlled Substance Agreement - Scan on 3/8/2016  4:25 AM : CONTROLLED SUBSTANCE AGREEMENT 03/07/16 (below)            Encounter-Level CSA - 04/10/2018:          Controlled Substance Agreement - Scan on 3/5/2015  9:09 AM : Controlled Substance Agreement 03/04/15 (below)                  Mental Health Follow up     Status since last visit: stable.     See PHQ-9 for current symptoms.  Other associated symptoms: None    Complicating factors: none at this time.   Significant life event:  No   Current substance abuse:  None  Anxiety or Panic symptoms:  No    Sleep - no issues at this time. Able to fall asleep after having nocturia x 3.   Appetite - good.   Exercise - walking about 10 minutes per day.    Smoking - 3 cigarettes, willing to use medication to help quit.   Alcohol - denies.   Street drugs - no  Marijuana - no  Caffeine- 1-2 in the morning only. Tea sometimes.     PHQ-9  English PHQ-9   Any Language               Problems taking medications regularly: No    Medication side effects: none    Diet: regular (no restrictions)    Social History   Substance Use Topics     Smoking status: Current Every Day Smoker     Packs/day: 0.25     Years: 40.00     Types: Cigarettes     Last attempt to quit: 10/31/2016      Smokeless tobacco: Never Used     Alcohol use No        Problem list and histories reviewed & adjusted, as indicated.  Additional history: as documented    Patient Active Problem List   Diagnosis     Hyperlipidemia LDL goal <100     ARAUZ (dyspnea on exertion)     COPD (chronic obstructive pulmonary disease) (H)     Edema     Fatigue     History of MI (myocardial infarction)     Snores     Knee pain, left     Bunion of great toe of left foot     Dystrophic nail     Arthritis     Peripheral vascular disease (H)     Chronic low back pain     Health Care Home     CKD (chronic kidney disease) stage 4, GFR 15-29 ml/min (H)     Abnormal gait     Advance Care Planning     Vitamin D deficiency     Constipation     Hypertension goal BP (blood pressure) < 130/80     Bradycardia     Callus of foot     Other chronic pain     Cataracts, bilateral     Hyperopic astigmatism of both eyes     Presbyopia     Asymptomatic bunion, right     Acquired hypothyroidism     Type 2 diabetes mellitus with diabetic chronic kidney disease (H)     Hypertension associated with diabetes (H)     Hyperlipidemia associated with type 2 diabetes mellitus (H)     Obesity (BMI 30-39.9)     History of sick sinus syndrome     Nephrotic syndrome     Pneumonia     Grief     Cigarette nicotine dependence without complication     HCOM with LVOT     Hyperkalemia     Type 2 diabetes, HbA1C goal < 8% (H)     Smoker     Single kidney     Hypothyroid     Hypertension goal BP (blood pressure) < 140/90     Hyperlipidemia     Diabetic nephropathy (H)     Hypoalbuminemia     Skin lesion of hand     ERRONEOUS ENCOUNTER--DISREGARD     JUSTINE (generalized anxiety disorder)     Past Surgical History:   Procedure Laterality Date     APPENDECTOMY       BLEPHAROPLASTY BILATERAL  9/18/2013    Procedure: BLEPHAROPLASTY BILATERAL;  BILATERAL UPPER EYELID BLEPHAROPLASTY ;  Surgeon: Olayinka Lyon MD;  Location:  [facility]     CATARACT IOL, RT/LT Bilateral 2016     CHOLECYSTECTOMY        COLONOSCOPY  7/15/2011    polyps repeat in 5 years     elected term pregnancy       HYSTEROSCOPIC PLACEMENT CONTRACEPTIVE DEVICE       KIDNEY SURGERY      donated left kideny     OVARY SURGERY      left for cyst benign     subclavian stent  2010     [facility]       Social History   Substance Use Topics     Smoking status: Current Every Day Smoker     Packs/day: 0.25     Years: 40.00     Types: Cigarettes     Last attempt to quit: 10/31/2016     Smokeless tobacco: Never Used     Alcohol use No     Family History   Problem Relation Age of Onset     Diabetes Mother      brother, MGM, sister     KIDNEY DISEASE Brother      X2 DM two      Alcohol/Drug Child      daughter     Asthma No family hx of      C.A.D. No family hx of      Hypertension No family hx of      Cerebrovascular Disease No family hx of      Breast Cancer No family hx of      Cancer - colorectal No family hx of      Prostate Cancer No family hx of      Allergies No family hx of      Alzheimer Disease No family hx of      Anesthesia Reaction No family hx of      Arthritis No family hx of      Blood Disease No family hx of      Cancer No family hx of      Cardiovascular No family hx of      Circulatory No family hx of      Congenital Anomalies No family hx of      Connective Tissue Disorder No family hx of      Depression No family hx of      Eye Disorder No family hx of      Genetic Disorder No family hx of      GASTROINTESTINAL DISEASE No family hx of      Genitourinary Problems No family hx of      Gynecology No family hx of      HEART DISEASE No family hx of      Lipids No family hx of      Musculoskeletal Disorder No family hx of      Neurologic Disorder No family hx of      Obesity No family hx of      Osteoporosis No family hx of      Psychotic Disorder No family hx of      Respiratory No family hx of      Thyroid Disease No family hx of      Glaucoma No family hx of      Macular Degeneration No family hx of            Current  Outpatient Prescriptions   Medication Sig Dispense Refill     albuterol (PROAIR HFA/PROVENTIL HFA/VENTOLIN HFA) 108 (90 Base) MCG/ACT inhaler Inhale 2 puffs into the lungs every 6 hours as needed for shortness of breath / dyspnea or wheezing 18 g 3     Alcohol Swabs (SM ALCOHOL PREP) 70 % PADS Externally apply 1 pad topically 4 times daily 100 each 11     amLODIPine (NORVASC) 10 MG tablet Take 1 tablet (10 mg) by mouth daily 90 tablet 3     ASPIR-LOW 81 MG EC tablet TAKE 1 TABLET BY MOUTH EVERY  tablet 2     atorvastatin (LIPITOR) 40 MG tablet Take 1 tablet (40 mg) by mouth daily 90 tablet 1     blood glucose monitoring (FREESTYLE LITE) test strip TEST BLOOD SUGAR TWO TIMES A DAY 50 strip 12     blood glucose monitoring (FREESTYLE) lancets Test BS two times daily as directed 100 each 1     blood glucose monitoring (NO BRAND SPECIFIED) meter device kit Use to test blood sugar 2 times daily or as directed. Preferred blood glucose meter OR supplies to accompany: Blood Glucose Monitor Brands: per insurance. 1 kit 0     Blood Pressure Monitor KIT Automatic Blood Pressure Monitor 1 kit 0     Cholecalciferol (VITAMIN D) 2000 units tablet Take 2,000 Units by mouth daily 90 tablet 3     docusate sodium (COLACE) 100 MG capsule Take 1 capsule (100 mg) by mouth 2 times daily 60 capsule 4     Emollient (EUCERIN CALMING DAILY MOIST) CREA Externally apply 1 dose * topically daily 1 Tube 12     ergocalciferol (ERGOCALCIFEROL) 62565 units capsule Take 1 capsule (50,000 Units) by mouth once a week 8 capsule 0     ferrous sulfate (IRON) 325 (65 Fe) MG tablet        fluticasone (FLONASE) 50 MCG/ACT spray Spray 1-2 sprays into both nostrils daily 1 Bottle 1     furosemide (LASIX) 40 MG tablet Take 1.5 tablets (60 mg) by mouth daily 90 tablet 3     hydrALAZINE (APRESOLINE) 25 MG tablet Take 1 tablet (25 mg) by mouth 2 times daily       hydrALAZINE (APRESOLINE) 50 MG tablet        insulin glargine (LANTUS SOLOSTAR) 100 UNIT/ML pen  Inject 8 Units Subcutaneous every morning 15 mL 3     insulin pen needle 31G X 6 MM Use as directed 120 each 3     levothyroxine (SYNTHROID/LEVOTHROID) 200 MCG tablet Take 1 tablet (200 mcg) by mouth daily 90 tablet 0     metoprolol succinate (TOPROL-XL) 25 MG 24 hr tablet Take 1 tablet (25 mg) by mouth daily 90 tablet 3     mometasone-formoterol (DULERA) 100-5 MCG/ACT oral inhaler Inhale 2 puffs into the lungs 2 times daily 1 Inhaler 1     nicotine polacrilex (COMMIT) 2 MG lozenge Dissolve 1 lozenge orally every 4 hours as directed. 100 lozenge 2     nitroGLYcerin (NITROSTAT) 0.4 MG sublingual tablet Place 1 tablet (0.4 mg) under the tongue every 5 minutes as needed for chest pain 25 tablet 0     nystatin (MYCOSTATIN) 632445 UNIT/GM POWD Apply 1 g topically 3 times daily as needed 30 g 1     order for DME Equipment being ordered: Diabetic Shoes 1 each 0     order for DME Equipment being ordered: two pairs moderate knee high support hose 2 Device 1     order for DME Equipment being ordered: Compression socks.  Strength:15-20 mmHg 2 each 0     order for DME One wheeled walker with seat and brakes and basket 1 Device 0     oxyCODONE IR (ROXICODONE) 10 MG tablet Take 1 tablet (10 mg) by mouth every 8 hours as needed 90 tablet 0     polyethylene glycol (MIRALAX) powder Take 17 g (1 capful) by mouth daily as needed for constipation 510 g 2     sodium bicarbonate 325 MG tablet Take 2 tablets (650 mg) by mouth 2 times daily 360 tablet 3     sodium bicarbonate 650 MG tablet        TECHLITE PEN NEEDLES 32G X 4 MM        tiotropium (SPIRIVA HANDIHALER) 18 MCG capsule Inhale contents of one capsule daily. 90 capsule 3     Allergies   Allergen Reactions     Contrast Dye Hives and Itching     Clonidine      She had as IP and thinks it made her itchy     Diatrizoate Other (See Comments)     Diltiazem      Severe bradycardia     Iodine-131      Recent Labs   Lab Test  08/03/18   0859  07/26/18   1116  05/16/18   1030   01/16/18    1055   10/25/17   0639  10/20/17   1626   09/07/17   1135  05/10/17   1025   11/02/16   0622   A1C   --   5.8*   --    --   6.0   --   6.6*   --    --   6.4*  6.6*   < >   --    LDL   --    --    --    --    --    --   104*   --    --   81  96   --    --    HDL   --    --    --    --    --    --   67   --    --   75  66   --    --    TRIG   --    --    --    --    --    --   212*   --    --   283*  303*   --    --    ALT   --    --    --    --    --    --   16   --    --   18   --    --   21   CR  3.88*   --   3.86*   < >  3.34*   < >  2.77*   --    < >  2.70*  2.25*   < >  2.64*   GFRESTIMATED  11*   --   11*   < >  14*   < >  17*   --    < >  17*  21*   < >  18*   GFRESTBLACK  14*   --   14*   < >  16*   < >  20*   --    < >  21*  26*   < >  22*   POTASSIUM  4.9   --   5.3   < >  4.8   < >  4.3   --    < >  6.2*  5.5*   < >  5.6*   TSH   --   1.12   --    --    --    --    --   1.04   --   71.77*   --    < >   --     < > = values in this interval not displayed.      BP Readings from Last 3 Encounters:   08/30/18 120/64   08/16/18 164/82   08/03/18 183/77    Wt Readings from Last 3 Encounters:   08/30/18 160 lb (72.6 kg)   08/03/18 161 lb (73 kg)   07/26/18 167 lb 8 oz (76 kg)        Labs reviewed in EPIC  Problem list, Medication list, Allergies, and Medical/Social/Surgical histories reviewed in [facility] and updated as appropriate.     ROS: Constitutional, neuro, ENT, endocrine, pulmonary, cardiac, gastrointestinal, genitourinary, musculoskeletal, integument and psychiatric systems are negative, except as otherwise noted above in the HPI.    OBJECTIVE:                                                    Pulse 67  Temp 98.2  F (36.8  C) (Oral)  Resp 16  Wt 166 lb (75.3 kg)  SpO2 98%  BMI 26.79 kg/m2  Body mass index is 26.79 kg/(m^2).  GENERAL: healthy, alert, no distress  EYES: Eyes grossly normal to inspection, extraocular movements - intactn  RESP: lungs clear to auscultation - no rales, no rhonchi, no  wheezes  CV: regular rates and rhythm, normal S1 S2, no S3 or S4 and no murmur, no click or rub   MS: slow normal gait.   NEURO: strength and tone- normal, sensory exam- grossly normal, mentation- intact, speech- normal, Non focal no aphasia. No facial asymmetry.   Mental Status Assessment:  Appearance:   Appropriate   Eye Contact:   Good   Psychomotor Behavior: Normal   Attitude:   Cooperative   Orientation:   All  Speech   Rate / Production: Normal    Volume:  Normal   Mood:    Normal  Affect:    Appropriate   Thought Content:  Clear   Thought Form:  Coherent  Logical   Insight:    Fair   Attention Span and Concentration: appropriate.   Recent and Remote Memory:  intact  Fund of Knowledge: appropriate  Muscle Strength and Tone: normal   Suicidal Ideation: reports no thoughts, no intention    See [facility] notes     Diagnostic Test Results:  Results for orders placed or performed in visit on 08/03/18   Renal panel   Result Value Ref Range    Sodium 144 133 - 144 mmol/L    Potassium 4.9 3.4 - 5.3 mmol/L    Chloride 116 (H) 94 - 109 mmol/L    Carbon Dioxide 19 (L) 20 - 32 mmol/L    Anion Gap 9 3 - 14 mmol/L    Glucose 94 70 - 99 mg/dL    Urea Nitrogen 37 (H) 7 - 30 mg/dL    Creatinine 3.88 (H) 0.52 - 1.04 mg/dL    GFR Estimate 11 (L) >60 mL/min/1.7m2    GFR Estimate If Black 14 (L) >60 mL/min/1.7m2    Calcium 7.7 (L) 8.5 - 10.1 mg/dL    Phosphorus 4.7 (H) 2.5 - 4.5 mg/dL    Albumin 1.9 (L) 3.4 - 5.0 g/dL   CBC with platelets   Result Value Ref Range    WBC 7.7 4.0 - 11.0 10e9/L    RBC Count 4.20 3.8 - 5.2 10e12/L    Hemoglobin 11.9 11.7 - 15.7 g/dL    Hematocrit 39.1 35.0 - 47.0 %    MCV 93 78 - 100 fl    MCH 28.3 26.5 - 33.0 pg    MCHC 30.4 (L) 31.5 - 36.5 g/dL    RDW 13.9 10.0 - 15.0 %    Platelet Count 305 150 - 450 10e9/L   Parathyroid Hormone Intact   Result Value Ref Range    Parathyroid Hormone Intact 486 (H) 18 - 80 pg/mL   Vitamin D Deficiency   Result Value Ref Range    Vitamin D Deficiency screening 11 (L) 20 -  75 ug/L     *Note: Due to a large number of results and/or encounters for the requested time period, some results have not been displayed. A complete set of results can be found in Results Review.        ASSESSMENT/PLAN:                                                    (Z23) Need for prophylactic vaccination and inoculation against influenza  (primary encounter diagnosis)  Comment: Routine annual vaccination.   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   Plan: Emollient (EUCERIN CALMING DAILY MOIST) CREA        Refill done.     (E78.5) Hyperlipidemia LDL goal <100  Comment:   Cholesterol   Date Value Ref Range Status   10/25/2017 214 (H) <200 mg/dL Final     Comment:     Desirable:       <200 mg/dl     Plan: Lipid panel reflex to direct LDL Fasting        -Discussed continued healthy eating and increase activity as tolerated.     (M54.5,  G89.29) Chronic low back pain without sciatica, unspecified back pain laterality  Comment: Stable with the pain medications.   Plan: oxyCODONE IR (ROXICODONE) 10 MG tablet        Refills done.       Patient Instructions   -Refills done.   -Flu vaccination today.   -Call clinic with questions or concerns.     I spent 25 min spent in direct face to face time with Kim Anne, greater than 50% in counseling and coordination of care for:  Diabetes, Chronic pain and Hyperlipidemia.       JUNIOR Lopez [facility]             Injectable Influenza Immunization Documentation    1.  Is the person to be vaccinated sick today?   No    2. Does  the person to be vaccinated have an allergy to a component   of the vaccine?   No  Egg Allergy Algorithm Link    3. Has the person to be vaccinated ever had a serious reaction   to influenza vaccine in the past?   No    4. Has the person to be vaccinated ever had Guillain-Barré syndrome?   No    Form completed by Carola Salamanca CMA

## 2018-09-28 ENCOUNTER — OFFICE VISIT (OUTPATIENT)
Dept: BEHAVIORAL HEALTH | Facility: CLINIC | Age: 73
End: 2018-09-28
Payer: COMMERCIAL

## 2018-09-28 ENCOUNTER — OFFICE VISIT (OUTPATIENT)
Dept: FAMILY MEDICINE | Facility: CLINIC | Age: 73
End: 2018-09-28
Payer: COMMERCIAL

## 2018-09-28 VITALS
WEIGHT: 166 LBS | OXYGEN SATURATION: 98 % | BODY MASS INDEX: 26.79 KG/M2 | HEART RATE: 67 BPM | TEMPERATURE: 98.2 F | RESPIRATION RATE: 16 BRPM

## 2018-09-28 DIAGNOSIS — G89.29 CHRONIC LOW BACK PAIN WITHOUT SCIATICA, UNSPECIFIED BACK PAIN LATERALITY: ICD-10-CM

## 2018-09-28 DIAGNOSIS — E78.5 HYPERLIPIDEMIA LDL GOAL <100: ICD-10-CM

## 2018-09-28 DIAGNOSIS — M54.50 CHRONIC LOW BACK PAIN WITHOUT SCIATICA, UNSPECIFIED BACK PAIN LATERALITY: ICD-10-CM

## 2018-09-28 DIAGNOSIS — E11.49 TYPE II OR UNSPECIFIED TYPE DIABETES MELLITUS WITH NEUROLOGICAL MANIFESTATIONS, NOT STATED AS UNCONTROLLED(250.60) (H): ICD-10-CM

## 2018-09-28 DIAGNOSIS — F43.21 GRIEF: Primary | ICD-10-CM

## 2018-09-28 DIAGNOSIS — Z23 NEED FOR PROPHYLACTIC VACCINATION AND INOCULATION AGAINST INFLUENZA: Primary | ICD-10-CM

## 2018-09-28 PROCEDURE — 90832 PSYTX W PT 30 MINUTES: CPT | Performed by: SOCIAL WORKER

## 2018-09-28 PROCEDURE — G0008 ADMIN INFLUENZA VIRUS VAC: HCPCS | Performed by: NURSE PRACTITIONER

## 2018-09-28 PROCEDURE — 99214 OFFICE O/P EST MOD 30 MIN: CPT | Mod: 25 | Performed by: NURSE PRACTITIONER

## 2018-09-28 PROCEDURE — 90662 IIV NO PRSV INCREASED AG IM: CPT | Performed by: NURSE PRACTITIONER

## 2018-09-28 RX ORDER — PETROLATUM,WHITE 41 %
1 OINTMENT (GRAM) TOPICAL DAILY
Qty: 1 TUBE | Refills: 12 | Status: ON HOLD | OUTPATIENT
Start: 2018-09-28 | End: 2020-02-28

## 2018-09-28 RX ORDER — OXYCODONE HYDROCHLORIDE 10 MG/1
10 TABLET ORAL EVERY 8 HOURS PRN
Qty: 90 TABLET | Refills: 0 | Status: SHIPPED | OUTPATIENT
Start: 2018-09-28 | End: 2018-10-19

## 2018-09-28 NOTE — MR AVS SNAPSHOT
After Visit Summary   9/28/2018    Kim Anne    MRN: [id number]           Patient Information     Date Of Birth          1945        Visit Information        Provider Department      9/28/2018 10:30 AM Ailyn Nieves APRN [facility]        Today's Diagnoses     Hyperlipidemia LDL goal <100    -  1    Need for prophylactic vaccination and inoculation against influenza        Chronic low back pain without sciatica, unspecified back pain laterality        Type II or unspecified type diabetes mellitus with neurological manifestations, not stated as uncontrolled(250.60) (H)          Care Instructions    -Refills done.   -Flu vaccination today.   -Call clinic with questions or concerns.             Follow-ups after your visit        Follow-up notes from your care team     Return in about 4 weeks (around 10/26/2018) for Chronic pain, Mental Health, Med-check.      Your next 10 appointments already scheduled     Oct 05, 2018  1:00 PM CDT   (Arrive by 12:45 PM)   New Patient Visit with Noemy Merritt MD   [facility] ([facility])    [address] [place] [address]  [address]  [facility] [address] [phone]            Nov 16, 2018  8:45 AM CST   Lab with  LAB   [facility] ([facility])    [address] [place] [address]  1st Floor  [facility] [address] [phone]            Nov 16, 2018  9:40 AM CST   (Arrive by 9:10 AM)   Return Visit with Bhargav Lopes MD   [facility] ([facility])    [address] [place] [address]  [address]  [facility] [id number] [phone]              Future tests that were ordered for you today     Open Future Orders        Priority Expected Expires Ordered    Lipid panel reflex to direct LDL Fasting Routine  9/28/2019 9/28/2018            Who to contact     If you have questions or need follow up information about  "today's clinic visit or your schedule please contact [facility] directly at [phone].  Normal or non-critical lab and imaging results will be communicated to you by MyChart, letter or phone within 4 business days after the clinic has received the results. If you do not hear from us within 7 days, please contact the clinic through [email or web address] or phone. If you have a critical or abnormal lab result, we will notify you by phone as soon as possible.  Submit refill requests through [facility] or call your pharmacy and they will forward the refill request to us. Please allow 3 business days for your refill to be completed.          Additional Information About Your Visit        [email or web address] Information     [facility] lets you send messages to your doctor, view your test results, renew your prescriptions, schedule appointments and more. To sign up, go to [email or web address] . Click on \"Log in\" on the left side of the screen, which will take you to the Welcome page. Then click on \"Sign up Now\" on the right side of the page.     You will be asked to enter the access code listed below, as well as some personal information. Please follow the directions to create your username and password.     Your access code is: [email or web address]  Expires: 2018  6:30 AM     Your access code will  in 90 days. If you need help or a new code, please call your [facility] clinic or [phone].        Care EveryWhere ID     This is your Care EveryWhere ID. This could be used by other organizations to access your [facility] medical records  [id number]        Your Vitals Were     Pulse Temperature Respirations Pulse Oximetry BMI (Body Mass Index)       67 98.2  F (36.8  C) (Oral) 16 98% 26.79 kg/m2        Blood Pressure from Last 3 Encounters:   18 120/64   18 164/82   18 183/77    Weight from Last 3 Encounters:   18 166 lb (75.3 kg)   18 160 lb (72.6 kg)   18 161 lb (73 kg)         "      We Performed the Following     ADMIN INFLUENZA (For MEDICARE Patients ONLY) []     FLU VACCINE, INCREASED ANTIGEN, PRESV FREE, AGE 65+ [64772]          Today's Medication Changes          These changes are accurate as of 9/28/18 10:53 AM.  If you have any questions, ask your nurse or doctor.               These medicines have changed or have updated prescriptions.        Dose/Directions    oxyCODONE IR 10 MG tablet   Commonly known as:  ROXICODONE   This may have changed:  reasons to take this   Used for:  Chronic low back pain without sciatica, unspecified back pain laterality   Changed by:  Ailyn Nieves APRN CNP        Dose:  10 mg   Take 1 tablet (10 mg) by mouth every 8 hours as needed for moderate to severe pain   Quantity:  90 tablet   Refills:  0            Where to get your medicines      These medications were sent to [facility] [place], [place] - [address] [facility]  [address] [facility] [address]     Phone:  [phone]     EUCERIN CALMING DAILY MOIST Crea         Some of these will need a paper prescription and others can be bought over the counter.  Ask your nurse if you have questions.     Bring a paper prescription for each of these medications     oxyCODONE IR 10 MG tablet               Information about OPIOIDS     PRESCRIPTION OPIOIDS: WHAT YOU NEED TO KNOW   We gave you an opioid (narcotic) pain medicine. It is important to manage your pain, but opioids are not always the best choice. You should first try all the other options your care team gave you. Take this medicine for as short a time (and as few doses) as possible.    Some activities can increase your pain, such as bandage changes or therapy sessions. It may help to take your pain medicine 30 to 60 minutes before these activities. Reduce your stress by getting enough sleep, working on hobbies you enjoy and practicing relaxation or meditation. Talk to your care team about ways to manage your pain  beyond prescription opioids.    These medicines have risks:    DO NOT drive when on new or higher doses of pain medicine. These medicines can affect your alertness and reaction times, and you could be arrested for driving under the influence (DUI). If you need to use opioids long-term, talk to your care team about driving.    DO NOT operate heavy machinery    DO NOT do any other dangerous activities while taking these medicines.    DO NOT drink any alcohol while taking these medicines.     If the opioid prescribed includes acetaminophen, DO NOT take with any other medicines that contain acetaminophen. Read all labels carefully. Look for the word  acetaminophen  or  Tylenol.  Ask your pharmacist if you have questions or are unsure.    You can get addicted to pain medicines, especially if you have a history of addiction (chemical, alcohol or substance dependence). Talk to your care team about ways to reduce this risk.    All opioids tend to cause constipation. Drink plenty of water and eat foods that have a lot of fiber, such as fruits, vegetables, prune juice, apple juice and high-fiber cereal. Take a laxative (Miralax, milk of magnesia, Colace, Senna) if you don t move your bowels at least every other day. Other side effects include upset stomach, sleepiness, dizziness, throwing up, tolerance (needing more of the medicine to have the same effect), physical dependence and slowed breathing.    Store your pills in a secure place, locked if possible. We will not replace any lost or stolen medicine. If you don t finish your medicine, please throw away (dispose) as directed by your pharmacist. The [place] Pollution Control Agency has more information about safe disposal: https://www.pca.[facility].[place].us/living-green/managing-unwanted-medications         Primary Care Provider Office Phone # Fax #    JUNIOR Bishop [phone]       [address] [facility] [address]  [facility] [address]        Equal Access to  Services     [facility]: Hadii aad ku hadjajanet Stephyluiz, wadellada luqadaha, qaybta kaalmada stephanie, kassandra white . So [facility] [phone].    ATENCIÓN: Si habla florence, tiene a bailey disposición servicios gratuitos de asistencia lingüística. Llame al [phone].    We comply with applicable federal civil rights laws and [place] laws. We do not discriminate on the basis of race, color, national origin, age, disability, sex, sexual orientation, or gender identity.            Thank you!     Thank you for choosing [facility]  for your care. Our goal is always to provide you with excellent care. Hearing back from our patients is one way we can continue to improve our services. Please take a few minutes to complete the written survey that you may receive in the mail after your visit with us. Thank you!             Your Updated Medication List - Protect others around you: Learn how to safely use, store and throw away your medicines at www.disposemymeds.org.          This list is accurate as of 9/28/18 10:53 AM.  Always use your most recent med list.                   Brand Name Dispense Instructions for use Diagnosis    albuterol 108 (90 Base) MCG/ACT inhaler    PROAIR HFA/PROVENTIL HFA/VENTOLIN HFA    18 g    Inhale 2 puffs into the lungs every 6 hours as needed for shortness of breath / dyspnea or wheezing    Chronic obstructive pulmonary disease, unspecified COPD type (H)       amLODIPine 10 MG tablet    NORVASC    90 tablet    Take 1 tablet (10 mg) by mouth daily    Renovascular hypertension       ASPIR-LOW 81 MG EC tablet   Generic drug:  aspirin     120 tablet    TAKE 1 TABLET BY MOUTH EVERY DAY    History of MI (myocardial infarction), Essential hypertension, benign       atorvastatin 40 MG tablet    LIPITOR    90 tablet    Take 1 tablet (40 mg) by mouth daily    Hyperlipidemia LDL goal <100       blood glucose monitoring lancets     100 each    Test BS  two times daily as directed    Type 2 diabetes mellitus without complication, with long-term current use of insulin (H)       blood glucose monitoring meter device kit    no brand specified    1 kit    Use to test blood sugar 2 times daily or as directed. Preferred blood glucose meter OR supplies to accompany: Blood Glucose Monitor Brands: per insurance.    Type 2 diabetes mellitus without complication, with long-term current use of insulin (H)       blood glucose monitoring test strip    FREESTYLE LITE    50 strip    TEST BLOOD SUGAR TWO TIMES A DAY    Type 2 diabetes mellitus without complication, with long-term current use of insulin (H)       Blood Pressure Monitor Kit     1 kit    Automatic Blood Pressure Monitor    Renovascular hypertension       docusate sodium 100 MG capsule    COLACE    60 capsule    Take 1 capsule (100 mg) by mouth 2 times daily    Constipation, unspecified constipation type       ergocalciferol 41621 units capsule    ERGOCALCIFEROL    8 capsule    Take 1 capsule (50,000 Units) by mouth once a week    Vitamin D deficiency disease       EUCERIN CALMING DAILY MOIST Crea     1 Tube    Externally apply 1 dose. topically daily    Type II or unspecified type diabetes mellitus with neurological manifestations, not stated as uncontrolled(250.60) (H)       ferrous sulfate 325 (65 Fe) MG tablet    IRON          fluticasone 50 MCG/ACT spray    FLONASE    1 Bottle    Spray 1-2 sprays into both nostrils daily    Acute nasopharyngitis       furosemide 40 MG tablet    LASIX    90 tablet    Take 1.5 tablets (60 mg) by mouth daily    Hyperkalemia, Edema, unspecified type       * hydrALAZINE 50 MG tablet    APRESOLINE          * hydrALAZINE 25 MG tablet    APRESOLINE     Take 1 tablet (25 mg) by mouth 2 times daily    Essential hypertension, Chronic kidney disease, stage 4 (severe) (H)       insulin glargine 100 UNIT/ML injection    LANTUS    15 mL    Inject 8 Units Subcutaneous every morning    Controlled  type 2 diabetes mellitus with stage 4 chronic kidney disease, with long-term current use of insulin (H)       * insulin pen needle 31G X 6 MM     120 each    Use as directed    Type 2 diabetes mellitus without complication, with long-term current use of insulin (H)       * TECHLITE PEN NEEDLES 32G X 4 MM   Generic drug:  insulin pen needle           levothyroxine 200 MCG tablet    SYNTHROID/LEVOTHROID    90 tablet    Take 1 tablet (200 mcg) by mouth daily    Other specified hypothyroidism       metoprolol succinate 25 MG 24 hr tablet    TOPROL-XL    90 tablet    Take 1 tablet (25 mg) by mouth daily    Hypertension associated with diabetes (H)       mometasone-formoterol 100-5 MCG/ACT oral inhaler    DULERA    1 Inhaler    Inhale 2 puffs into the lungs 2 times daily    COPD (chronic obstructive pulmonary disease) (H)       nicotine polacrilex 2 MG lozenge    COMMIT    100 lozenge    Dissolve 1 lozenge orally every 4 hours as directed.    Chronic obstructive pulmonary disease, unspecified COPD type (H)       nitroGLYcerin 0.4 MG sublingual tablet    NITROSTAT    25 tablet    Place 1 tablet (0.4 mg) under the tongue every 5 minutes as needed for chest pain    History of MI (myocardial infarction)       nystatin 982598 UNIT/GM Powd    MYCOSTATIN    30 g    Apply 1 g topically 3 times daily as needed    Groin rash       * order for DME     1 Device    One wheeled walker with seat and brakes and basket    Risk for falls       * order for DME     2 each    Equipment being ordered: Compression socks. Strength:15-20 mmHg    Localized edema       order for DME     1 each    Equipment being ordered: Diabetic Shoes    Type 2 diabetes mellitus with stage 5 chronic kidney disease not on chronic dialysis, without long-term current use of insulin (H)       order for DME     2 Device    Equipment being ordered: two pairs moderate knee high support hose    Edema, unspecified type       oxyCODONE IR 10 MG tablet    ROXICODONE    90  tablet    Take 1 tablet (10 mg) by mouth every 8 hours as needed for moderate to severe pain    Chronic low back pain without sciatica, unspecified back pain laterality       polyethylene glycol powder    MIRALAX    510 g    Take 17 g (1 capful) by mouth daily as needed for constipation    Slow transit constipation       SM ALCOHOL PREP 70 % Pads     100 each    Externally apply 1 pad topically 4 times daily    Type 2 diabetes mellitus without complication, with long-term current use of insulin (H)       * sodium bicarbonate 325 MG tablet     360 tablet    Take 2 tablets (650 mg) by mouth 2 times daily    Acidosis, CKD (chronic kidney disease) stage 4, GFR 15-29 ml/min (H)       * sodium bicarbonate 650 MG tablet           tiotropium 18 MCG capsule    SPIRIVA HANDIHALER    90 capsule    Inhale contents of one capsule daily.    Pulmonary emphysema, unspecified emphysema type (H)       vitamin D 2000 units tablet     90 tablet    Take 2,000 Units by mouth daily    Vitamin D deficiency disease       * Notice:  This list has 8 medication(s) that are the same as other medications prescribed for you. Read the directions carefully, and ask your doctor or other care provider to review them with you.

## 2018-09-28 NOTE — PROGRESS NOTES
[facility]   September 28, 2018      Behavioral Health Clinician Progress Note    Patient Name: Kim Anne           Service Type: Individual      Service Location:   Face to Face in Clinic     Session Start Time: 10:30am  Session End Time: 10:40am      Session Length: 16 - 37      Attendees: Patient and PCP    Visit Activities (Refresh list every visit): [facility] [employer]    Diagnostic Assessment Date: TBD  Treatment Plan Review Date: TBD  See Flowsheets for today's PHQ-9 and JUSTINE-7 results  Previous PHQ-9:   PHQ-9 SCORE 2/12/2018 3/12/2018 6/25/2018   Total Score - - -   Total Score - - -   Total Score 0 0 0     Previous JUSTINE-7:   JUSTINE-7 SCORE 12/12/2016 3/12/2018 6/25/2018   Total Score - - -   Total Score 0 0 0   Total Score - - -       NAE LEVEL:  NAE Score (Last Two) 2/18/2013 5/23/2014   NAE Raw Score 48 45   Activation Score 80 73.1   NAE Level 4 4       DATA  Extended Session (60+ minutes): No  Interactive Complexity: No  Crisis: No    Treatment Objective(s) Addressed in This Session:  Target Behavior(s): disease management/lifestyle changes manage diabetes better    Grief / Loss: will process grief/loss issues in an adaptive manner  Psychological distress related to Diabetes    Current Stressors / Issues:  [facility] met with patient at PCP request to assess current behavioral health needs and provide information and support as necessary.  Pt reported that she [place]. Pt stated that her grandson was in a very bad car accident up north and is still in the hospital so she is going to go up [place] this weekend to visit him. Pt reported that she is worked about him but he is awake and recognizes people. Pt reported that in terms of her mental health she feels like it is stable. Pt denied any extreme anxiety or depression. She reported that her living situation has calmed down and has been more stable as well. Pt reported that she is trying to  exercise everyday. Pt is also open to starting a medication to help reduce her smoking.    Reviewed new and ongoing stressors  Reviewed mental health symptoms and appropriate coping mechanisms    I affirmed the steps this patient has taken to address physical and behavioral health issues, and offered continued behavioral health services or referral, now or in the future, as needed by the patient.    Progress on Treatment Objective(s) / Homework:  Satisfactory progress - CONTEMPLATION (Considering change and yet undecided); Intervened by assessing the negative and positive thinking (ambivalence) about behavior change    Motivational Interviewing    MI Intervention: Expressed Empathy/Understanding, Supported Autonomy, Collaboration, Evocation, Open-ended questions and Reflections: simple and complex     Change Talk Expressed by the Patient: Committment to change Activation Taking steps    Provider Response to Change Talk: E - Evoked more info from patient about behavior change, A - Affirmed patient's thoughts, decisions, or attempts at behavior change, R - Reflected patient's change talk and S - Summarized patient's change talk statements      Care Plan review completed: No    Medication Review:  No changes to current psychiatric medication(s)    Medication Compliance:  Yes    Changes in Health Issues:   None reported    Chemical Use Review:   Substance Use: Chemical use reviewed, no active concerns identified      Tobacco Use: Yes, increase.  Client reports frequency of use 6 cigarettes daily. Reviewed information and resources for quitting  Reviewed options for assistance and intervention  Provided encouragement to quit     Assessment: Current Emotional / Mental Status (status of significant symptoms):  Risk status (Self / Other harm or suicidal ideation)  Patient denies a history of suicidal ideation, suicide attempts, self-injurious behavior, homicidal ideation, homicidal behavior and and other safety  concerns  Patient denies current fears or concerns for personal safety.  Patient denies current or recent suicidal ideation or behaviors.  Patient denies current or recent homicidal ideation or behaviors.  Patient denies current or recent self injurious behavior or ideation.  Patient denies other safety concerns.  A safety and risk management plan has not been developed at this time, however patient was encouraged to call [place] [phone] should there be a change in any of these risk factors.    Appearance:   Appropriate   Eye Contact:   Fair   Psychomotor Behavior: Normal   Attitude:   Guarded   Orientation:   All  Speech   Rate / Production: Impoverished  Monotone    Volume:  Normal   Mood:    Normal  Affect:    Blunted  Flat   Thought Content:  Clear   Thought Form:  Logical   Insight:    Fair     Diagnoses:  1. Grief        Collateral Reports Completed:  Not Applicable    Plan: (Homework, other):  Patient was given information about behavioral services and encouraged to schedule a follow up appointment with the clinic [facility] as needed.  She was also given information about mental health symptoms and treatment options .  CD Recommendations: Maintain Sobriety.  LAMINE Ndiaye, [facility]      ______________________________________________________________________    Integrated Primary Care Behavioral Health Treatment Plan    Patient's Name: Kim Anne  YOB: 1945    Date: To be completed

## 2018-09-28 NOTE — MR AVS SNAPSHOT
After Visit Summary   9/28/2018    Kim Anne    MRN: [id number]           Patient Information     Date Of Birth          1945        Visit Information        Provider Department      9/28/2018 10:30 AM Aviva Lackey LICSW [facility]        Today's Diagnoses     Grief    -  1       Follow-ups after your visit        Your next 10 appointments already scheduled     Oct 05, 2018  1:00 PM CDT   (Arrive by 12:45 PM)   New Patient Visit with Noemy Merritt MD   [facility] Infectious Diseases ([facility])    [address]  [address]  [place] [id number] [phone]            Nov 16, 2018  8:45 AM CST   Lab with  LAB   [facility] ([facility])    [address] [place] [address]  1st Floor  [place] [id number] [phone]            Nov 16, 2018  9:40 AM CST   (Arrive by 9:10 AM)   Return Visit with Bhargav Lopes MD   [facility] Nephrology ([facility])    [address]  [address]  [place] [id number] [phone]              Future tests that were ordered for you today     Open Future Orders        Priority Expected Expires Ordered    Lipid panel reflex to direct LDL Fasting Routine  9/28/2019 9/28/2018            Who to contact     If you have questions or need follow up information about today's clinic visit or your schedule please contact [facility] directly at [phone].  Normal or non-critical lab and imaging results will be communicated to you by MyChart, letter or phone within 4 business days after the clinic has received the results. If you do not hear from us within 7 days, please contact the clinic through MyChart or phone. If you have a critical or abnormal lab result, we will notify you by phone as soon as possible.  Submit refill requests through [email or web address] or call your pharmacy and they will  "forward the refill request to us. Please allow 3 business days for your refill to be completed.          Additional Information About Your Visit        [employer] Information     Purple lets you send messages to your doctor, view your test results, renew your prescriptions, schedule appointments and more. To sign up, go to [email or web address] . Click on \"Log in\" on the left side of the screen, which will take you to the Welcome page. Then click on \"Sign up Now\" on the right side of the page.     You will be asked to enter the access code listed below, as well as some personal information. Please follow the directions to create your username and password.     Your access code is: [email or web address]  Expires: 2018  6:30 AM     Your access code will  in 90 days. If you need help or a new code, please call your [place] clinic or [phone].        Care EveryWhere ID     This is your Care EveryWhere ID. This could be used by other organizations to access your [place] medical records  [id number]         Blood Pressure from Last 3 Encounters:   18 120/64   18 164/82   18 183/77    Weight from Last 3 Encounters:   18 166 lb (75.3 kg)   18 160 lb (72.6 kg)   18 161 lb (73 kg)              Today, you had the following     No orders found for display         Today's Medication Changes          These changes are accurate as of 18 11:00 AM.  If you have any questions, ask your nurse or doctor.               These medicines have changed or have updated prescriptions.        Dose/Directions    oxyCODONE IR 10 MG tablet   Commonly known as:  ROXICODONE   This may have changed:  reasons to take this   Used for:  Chronic low back pain without sciatica, unspecified back pain laterality   Changed by:  Ailyn Nieves APRN CNP        Dose:  10 mg   Take 1 tablet (10 mg) by mouth every 8 hours as needed for moderate to severe pain   Quantity:  90 tablet   Refills:  0          "   Where to get your medicines      These medications were sent to [facility] [place], [place] - [address]  [address] [facility] [address]     Phone:  [phone]     EUCERIN CALMING DAILY MOIST Crea         Some of these will need a paper prescription and others can be bought over the counter.  Ask your nurse if you have questions.     Bring a paper prescription for each of these medications     oxyCODONE IR 10 MG tablet                Primary Care Provider Office Phone # Fax #    Ailyn Nieves, JUNIOR [phone]       [address]  [facility] [address]        Equal Access to Services     CECIL TOLLIVER : Hadii aad ku hadasho Soomaali, waaxda luqadaha, qaybta kaalmada adeegyada, waxlilliana white . So [facility] [phone].    ATENCIÓN: Si habla español, tiene a bailey disposición servicios gratuitos de asistencia lingüística. Llame al [phone].    We comply with applicable federal civil rights laws and [place] laws. We do not discriminate on the basis of race, color, national origin, age, disability, sex, sexual orientation, or gender identity.            Thank you!     Thank you for choosing [facility] PRIMARY [facility]  for your care. Our goal is always to provide you with excellent care. Hearing back from our patients is one way we can continue to improve our services. Please take a few minutes to complete the written survey that you may receive in the mail after your visit with us. Thank you!             Your Updated Medication List - Protect others around you: Learn how to safely use, store and throw away your medicines at www.disposemymeds.org.          This list is accurate as of 9/28/18 11:00 AM.  Always use your most recent med list.                   Brand Name Dispense Instructions for use Diagnosis    albuterol 108 (90 Base) MCG/ACT inhaler    PROAIR HFA/PROVENTIL HFA/VENTOLIN HFA    18 g    Inhale 2 puffs  into the lungs every 6 hours as needed for shortness of breath / dyspnea or wheezing    Chronic obstructive pulmonary disease, unspecified COPD type (H)       amLODIPine 10 MG tablet    NORVASC    90 tablet    Take 1 tablet (10 mg) by mouth daily    Renovascular hypertension       ASPIR-LOW 81 MG EC tablet   Generic drug:  aspirin     120 tablet    TAKE 1 TABLET BY MOUTH EVERY DAY    History of MI (myocardial infarction), Essential hypertension, benign       atorvastatin 40 MG tablet    LIPITOR    90 tablet    Take 1 tablet (40 mg) by mouth daily    Hyperlipidemia LDL goal <100       blood glucose monitoring lancets     100 each    Test BS two times daily as directed    Type 2 diabetes mellitus without complication, with long-term current use of insulin (H)       blood glucose monitoring meter device kit    no brand specified    1 kit    Use to test blood sugar 2 times daily or as directed. Preferred blood glucose meter OR supplies to accompany: Blood Glucose Monitor Brands: per insurance.    Type 2 diabetes mellitus without complication, with long-term current use of insulin (H)       blood glucose monitoring test strip    FREESTYLE LITE    50 strip    TEST BLOOD SUGAR TWO TIMES A DAY    Type 2 diabetes mellitus without complication, with long-term current use of insulin (H)       Blood Pressure Monitor Kit     1 kit    Automatic Blood Pressure Monitor    Renovascular hypertension       docusate sodium 100 MG capsule    COLACE    60 capsule    Take 1 capsule (100 mg) by mouth 2 times daily    Constipation, unspecified constipation type       ergocalciferol 45034 units capsule    ERGOCALCIFEROL    8 capsule    Take 1 capsule (50,000 Units) by mouth once a week    Vitamin D deficiency disease       EUCERIN CALMING DAILY MOIST Crea     1 Tube    Externally apply 1 dose. topically daily    Type II or unspecified type diabetes mellitus with neurological manifestations, not stated as uncontrolled(250.60) (H)        ferrous sulfate 325 (65 Fe) MG tablet    IRON          fluticasone 50 MCG/ACT spray    FLONASE    1 Bottle    Spray 1-2 sprays into both nostrils daily    Acute nasopharyngitis       furosemide 40 MG tablet    LASIX    90 tablet    Take 1.5 tablets (60 mg) by mouth daily    Hyperkalemia, Edema, unspecified type       * hydrALAZINE 50 MG tablet    APRESOLINE          * hydrALAZINE 25 MG tablet    APRESOLINE     Take 1 tablet (25 mg) by mouth 2 times daily    Essential hypertension, Chronic kidney disease, stage 4 (severe) (H)       insulin glargine 100 UNIT/ML injection    LANTUS    15 mL    Inject 8 Units Subcutaneous every morning    Controlled type 2 diabetes mellitus with stage 4 chronic kidney disease, with long-term current use of insulin (H)       * insulin pen needle 31G X 6 MM     120 each    Use as directed    Type 2 diabetes mellitus without complication, with long-term current use of insulin (H)       * TECHLITE PEN NEEDLES 32G X 4 MM   Generic drug:  insulin pen needle           levothyroxine 200 MCG tablet    SYNTHROID/LEVOTHROID    90 tablet    Take 1 tablet (200 mcg) by mouth daily    Other specified hypothyroidism       metoprolol succinate 25 MG 24 hr tablet    TOPROL-XL    90 tablet    Take 1 tablet (25 mg) by mouth daily    Hypertension associated with diabetes (H)       mometasone-formoterol 100-5 MCG/ACT oral inhaler    DULERA    1 Inhaler    Inhale 2 puffs into the lungs 2 times daily    COPD (chronic obstructive pulmonary disease) (H)       nicotine polacrilex 2 MG lozenge    COMMIT    100 lozenge    Dissolve 1 lozenge orally every 4 hours as directed.    Chronic obstructive pulmonary disease, unspecified COPD type (H)       nitroGLYcerin 0.4 MG sublingual tablet    NITROSTAT    25 tablet    Place 1 tablet (0.4 mg) under the tongue every 5 minutes as needed for chest pain    History of MI (myocardial infarction)       nystatin 787115 UNIT/GM Powd    MYCOSTATIN    30 g    Apply 1 g topically 3  times daily as needed    Groin rash       * order for DME     1 Device    One wheeled walker with seat and brakes and basket    Risk for falls       * order for DME     2 each    Equipment being ordered: Compression socks. Strength:15-20 mmHg    Localized edema       order for DME     1 each    Equipment being ordered: Diabetic Shoes    Type 2 diabetes mellitus with stage 5 chronic kidney disease not on chronic dialysis, without long-term current use of insulin (H)       order for DME     2 Device    Equipment being ordered: two pairs moderate knee high support hose    Edema, unspecified type       oxyCODONE IR 10 MG tablet    ROXICODONE    90 tablet    Take 1 tablet (10 mg) by mouth every 8 hours as needed for moderate to severe pain    Chronic low back pain without sciatica, unspecified back pain laterality       polyethylene glycol powder    MIRALAX    510 g    Take 17 g (1 capful) by mouth daily as needed for constipation    Slow transit constipation       SM ALCOHOL PREP 70 % Pads     100 each    Externally apply 1 pad topically 4 times daily    Type 2 diabetes mellitus without complication, with long-term current use of insulin (H)       * sodium bicarbonate 325 MG tablet     360 tablet    Take 2 tablets (650 mg) by mouth 2 times daily    Acidosis, CKD (chronic kidney disease) stage 4, GFR 15-29 ml/min (H)       * sodium bicarbonate 650 MG tablet           tiotropium 18 MCG capsule    SPIRIVA HANDIHALER    90 capsule    Inhale contents of one capsule daily.    Pulmonary emphysema, unspecified emphysema type (H)       vitamin D 2000 units tablet     90 tablet    Take 2,000 Units by mouth daily    Vitamin D deficiency disease       * Notice:  This list has 8 medication(s) that are the same as other medications prescribed for you. Read the directions carefully, and ask your doctor or other care provider to review them with you.

## 2018-10-03 ENCOUNTER — PATIENT OUTREACH (OUTPATIENT)
Dept: GERIATRIC MEDICINE | Facility: CLINIC | Age: 73
End: 2018-10-03

## 2018-10-03 NOTE — PROGRESS NOTES
[facility] Care Coordination Contact  Arranged transportation thru [facility] for the below appt:  Appt Date & Time: 10/05 at 12:45pm  Clinic Name & Address:  [facility] - [address] [place] [address]  Transportation Provider: [employer]   time:  11:45a - 12:15p    Notified member of  time.    Luci Godinez  Care Management Specialist Supervisor  [facility]  [phone]

## 2018-10-05 ENCOUNTER — OFFICE VISIT (OUTPATIENT)
Dept: INFECTIOUS DISEASES | Facility: CLINIC | Age: 73
End: 2018-10-05
Attending: STUDENT IN AN ORGANIZED HEALTH CARE EDUCATION/TRAINING PROGRAM
Payer: COMMERCIAL

## 2018-10-05 VITALS
DIASTOLIC BLOOD PRESSURE: 70 MMHG | HEIGHT: 65 IN | TEMPERATURE: 98.3 F | SYSTOLIC BLOOD PRESSURE: 139 MMHG | WEIGHT: 164.4 LBS | HEART RATE: 62 BPM | OXYGEN SATURATION: 97 % | BODY MASS INDEX: 27.39 KG/M2

## 2018-10-05 DIAGNOSIS — Z86.11 H/O TB (TUBERCULOSIS): ICD-10-CM

## 2018-10-05 DIAGNOSIS — R76.12 POSITIVE QUANTIFERON-TB GOLD TEST: Primary | ICD-10-CM

## 2018-10-05 DIAGNOSIS — Z76.82 KIDNEY TRANSPLANT CANDIDATE: ICD-10-CM

## 2018-10-05 DIAGNOSIS — Z71.9 HEALTH COUNSELING: ICD-10-CM

## 2018-10-05 DIAGNOSIS — Z23 NEED FOR VACCINATION: ICD-10-CM

## 2018-10-05 PROCEDURE — G0463 HOSPITAL OUTPT CLINIC VISIT: HCPCS | Mod: 25,ZF

## 2018-10-05 PROCEDURE — 90471 IMMUNIZATION ADMIN: CPT | Mod: ZF

## 2018-10-05 PROCEDURE — 25000581 ZZH RX MED A9270 GY (STAT IND- M) 250: Mod: ZF | Performed by: STUDENT IN AN ORGANIZED HEALTH CARE EDUCATION/TRAINING PROGRAM

## 2018-10-05 PROCEDURE — 90750 HZV VACC RECOMBINANT IM: CPT | Mod: ZF | Performed by: STUDENT IN AN ORGANIZED HEALTH CARE EDUCATION/TRAINING PROGRAM

## 2018-10-05 RX ADMIN — ZOSTER VACCINE RECOMBINANT, ADJUVANTED 0.5 ML: KIT at 14:09

## 2018-10-05 ASSESSMENT — PAIN SCALES - GENERAL: PAINLEVEL: NO PAIN (0)

## 2018-10-05 NOTE — NURSING NOTE
Chief Complaint   Patient presents with     Consult     consult positive Quant gold     Roma Razo MA
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
## 2018-10-05 NOTE — MR AVS SNAPSHOT
After Visit Summary   10/5/2018    Kim Anne    MRN: [id number]           Patient Information     Date Of Birth          1945        Visit Information        Provider Department      10/5/2018 1:00 PM Noemy Merritt MD [facility]        Today's Diagnoses     Need for vaccination    -  1       Follow-ups after your visit        Your next 10 appointments already scheduled     Oct 19, 2018 10:00 AM CDT   Return Visit with LAMINE Rodriguez   [facility] ([facility])    [address]  [address]  [facility] [address]   [phone]            Oct 19, 2018 10:00 AM CDT   Return Visit with JUNIOR Bishop CNP   [facility] [place]    [address]  [address]  [facility] [address]   [phone]            Nov 16, 2018  8:45 AM CST   Lab with  LAB   [facility] ([facility])    [address] [place] [address]  1st Floor  [facility] [id number] [phone]            Nov 16, 2018  9:40 AM CST   (Arrive by 9:10 AM)   Return Visit with Bhargav Lopes MD   [facility] ([facility])    [address] [place] [address]  [address]  [facility] [id number] [phone]            Dec 10, 2018 10:00 AM CST   Nurse Visit with  Dsc Nurse   [facility] ([facility])    [address] [place] [address]  [address]  [facility] [id number] [phone]              Who to contact     If you have questions or need follow up information about today's clinic visit or your schedule please contact [facility] directly at [phone].  Normal or non-critical lab and imaging results will be communicated to you by MyChart, letter or phone within 4 business days after the clinic has  "received the results. If you do not hear from us within 7 days, please contact the clinic through [employer] or phone. If you have a critical or abnormal lab result, we will notify you by phone as soon as possible.  Submit refill requests through [employer] or call your pharmacy and they will forward the refill request to us. Please allow 3 business days for your refill to be completed.          Additional Information About Your Visit        [employer] Information     [employer] lets you send messages to your doctor, view your test results, renew your prescriptions, schedule appointments and more. To sign up, go to [email or web address] . Click on \"Log in\" on the left side of the screen, which will take you to the Welcome page. Then click on \"Sign up Now\" on the right side of the page.     You will be asked to enter the access code listed below, as well as some personal information. Please follow the directions to create your username and password.     Your access code is: [email or web address]  Expires: 2018  6:30 AM     Your access code will  in 90 days. If you need help or a new code, please call your [facility] clinic or [phone].        Care EveryWhere ID     This is your Care EveryWhere ID. This could be used by other organizations to access your [facility] medical records  [id number]        Your Vitals Were     Pulse Temperature Height Pulse Oximetry BMI (Body Mass Index)       62 98.3  F (36.8  C) (Oral) 1.651 m (5' 5\") 97% 27.36 kg/m2        Blood Pressure from Last 3 Encounters:   10/05/18 139/70   18 120/64   18 164/82    Weight from Last 3 Encounters:   10/05/18 74.6 kg (164 lb 6.4 oz)   18 75.3 kg (166 lb)   18 72.6 kg (160 lb)              Today, you had the following     No orders found for display       Primary Care Provider Office Phone # Fax #    JUNIOR Bishop [phone]       [address] [place] [address]        Equal Access to Services  "    CECIL TOLLIVER : Hadii aad ku prerna [place], waaxda luqadaha, qaybta kaalmada adeelva, kassandra luis daniel hayanastasiia merazconchitaanna white . So [facility] [phone].    ATENCIÓN: Si habla español, tiene a bailey disposición servicios gratuitos de asistencia lingüística. Llame al [phone].    We comply with applicable federal civil rights laws and [place] laws. We do not discriminate on the basis of race, color, national origin, age, disability, sex, sexual orientation, or gender identity.            Thank you!     Thank you for choosing [facility]  for your care. Our goal is always to provide you with excellent care. Hearing back from our patients is one way we can continue to improve our services. Please take a few minutes to complete the written survey that you may receive in the mail after your visit with us. Thank you!             Your Updated Medication List - Protect others around you: Learn how to safely use, store and throw away your medicines at www.disposemymeds.org.          This list is accurate as of 10/5/18  2:04 PM.  Always use your most recent med list.                   Brand Name Dispense Instructions for use Diagnosis    albuterol 108 (90 Base) MCG/ACT inhaler    PROAIR HFA/PROVENTIL HFA/VENTOLIN HFA    18 g    Inhale 2 puffs into the lungs every 6 hours as needed for shortness of breath / dyspnea or wheezing    Chronic obstructive pulmonary disease, unspecified COPD type (H)       amLODIPine 10 MG tablet    NORVASC    90 tablet    Take 1 tablet (10 mg) by mouth daily    Renovascular hypertension       ASPIR-LOW 81 MG EC tablet   Generic drug:  aspirin     120 tablet    TAKE 1 TABLET BY MOUTH EVERY DAY    History of MI (myocardial infarction), Essential hypertension, benign       atorvastatin 40 MG tablet    LIPITOR    90 tablet    Take 1 tablet (40 mg) by mouth daily    Hyperlipidemia LDL goal <100       blood glucose monitoring lancets     100 each    Test BS two  times daily as directed    Type 2 diabetes mellitus without complication, with long-term current use of insulin (H)       blood glucose monitoring meter device kit    no brand specified    1 kit    Use to test blood sugar 2 times daily or as directed. Preferred blood glucose meter OR supplies to accompany: Blood Glucose Monitor Brands: per insurance.    Type 2 diabetes mellitus without complication, with long-term current use of insulin (H)       blood glucose monitoring test strip    FREESTYLE LITE    50 strip    TEST BLOOD SUGAR TWO TIMES A DAY    Type 2 diabetes mellitus without complication, with long-term current use of insulin (H)       Blood Pressure Monitor Kit     1 kit    Automatic Blood Pressure Monitor    Renovascular hypertension       docusate sodium 100 MG capsule    COLACE    60 capsule    Take 1 capsule (100 mg) by mouth 2 times daily    Constipation, unspecified constipation type       ergocalciferol 77269 units capsule    ERGOCALCIFEROL    8 capsule    Take 1 capsule (50,000 Units) by mouth once a week    Vitamin D deficiency disease       EUCERIN CALMING DAILY MOIST Crea     1 Tube    Externally apply 1 dose. topically daily    Type II or unspecified type diabetes mellitus with neurological manifestations, not stated as uncontrolled(250.60) (H)       ferrous sulfate 325 (65 Fe) MG tablet    IRON          fluticasone 50 MCG/ACT spray    FLONASE    1 Bottle    Spray 1-2 sprays into both nostrils daily    Acute nasopharyngitis       furosemide 40 MG tablet    LASIX    90 tablet    Take 1.5 tablets (60 mg) by mouth daily    Hyperkalemia, Edema, unspecified type       * hydrALAZINE 50 MG tablet    APRESOLINE          * hydrALAZINE 25 MG tablet    APRESOLINE     Take 1 tablet (25 mg) by mouth 2 times daily    Essential hypertension, Chronic kidney disease, stage 4 (severe) (H)       insulin glargine 100 UNIT/ML injection    LANTUS    15 mL    Inject 8 Units Subcutaneous every morning    Controlled type  2 diabetes mellitus with stage 4 chronic kidney disease, with long-term current use of insulin (H)       * insulin pen needle 31G X 6 MM     120 each    Use as directed    Type 2 diabetes mellitus without complication, with long-term current use of insulin (H)       * TECHLITE PEN NEEDLES 32G X 4 MM   Generic drug:  insulin pen needle           levothyroxine 200 MCG tablet    SYNTHROID/LEVOTHROID    90 tablet    Take 1 tablet (200 mcg) by mouth daily    Other specified hypothyroidism       metoprolol succinate 25 MG 24 hr tablet    TOPROL-XL    90 tablet    Take 1 tablet (25 mg) by mouth daily    Hypertension associated with diabetes (H)       mometasone-formoterol 100-5 MCG/ACT oral inhaler    DULERA    1 Inhaler    Inhale 2 puffs into the lungs 2 times daily    COPD (chronic obstructive pulmonary disease) (H)       nicotine polacrilex 2 MG lozenge    COMMIT    100 lozenge    Dissolve 1 lozenge orally every 4 hours as directed.    Chronic obstructive pulmonary disease, unspecified COPD type (H)       nitroGLYcerin 0.4 MG sublingual tablet    NITROSTAT    25 tablet    Place 1 tablet (0.4 mg) under the tongue every 5 minutes as needed for chest pain    History of MI (myocardial infarction)       nystatin 271992 UNIT/GM Powd    MYCOSTATIN    30 g    Apply 1 g topically 3 times daily as needed    Groin rash       * order for DME     1 Device    One wheeled walker with seat and brakes and basket    Risk for falls       * order for DME     2 each    Equipment being ordered: Compression socks. Strength:15-20 mmHg    Localized edema       order for DME     1 each    Equipment being ordered: Diabetic Shoes    Type 2 diabetes mellitus with stage 5 chronic kidney disease not on chronic dialysis, without long-term current use of insulin (H)       order for DME     2 Device    Equipment being ordered: two pairs moderate knee high support hose    Edema, unspecified type       oxyCODONE IR 10 MG tablet    ROXICODONE    90 tablet     Take 1 tablet (10 mg) by mouth every 8 hours as needed for moderate to severe pain    Chronic low back pain without sciatica, unspecified back pain laterality       polyethylene glycol powder    MIRALAX    510 g    Take 17 g (1 capful) by mouth daily as needed for constipation    Slow transit constipation       SM ALCOHOL PREP 70 % Pads     100 each    Externally apply 1 pad topically 4 times daily    Type 2 diabetes mellitus without complication, with long-term current use of insulin (H)       * sodium bicarbonate 325 MG tablet     360 tablet    Take 2 tablets (650 mg) by mouth 2 times daily    Acidosis, CKD (chronic kidney disease) stage 4, GFR 15-29 ml/min (H)       * sodium bicarbonate 650 MG tablet           tiotropium 18 MCG capsule    SPIRIVA HANDIHALER    90 capsule    Inhale contents of one capsule daily.    Pulmonary emphysema, unspecified emphysema type (H)       vitamin D 2000 units tablet     90 tablet    Take 2,000 Units by mouth daily    Vitamin D deficiency disease       * Notice:  This list has 8 medication(s) that are the same as other medications prescribed for you. Read the directions carefully, and ask your doctor or other care provider to review them with you.
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
## 2018-10-18 NOTE — PROGRESS NOTES
"SUBJECTIVE:                                                    Kim Anne is a 73 year old  female who presents to clinic today for the following health issues:  [facility]: Don    Patient states that her transplant team is looking into having her participate in a Paired Kidney Exchange Program.;     Patient thought that she was running late today and forgot to take all her medications this morning. Otherwise denies having trouble taking her medications.     Physical Health:    In general, how would you rate your overall physical health? good    Outside of work, how many days during the week do you exercise? 2-3 days/week    Outside of work, approximately how many minutes a day do you exercise?not applicable    If you drink alcohol do you typically have >3 drinks per day or >7 drinks per week? No    Do you usually eat at least 4 servings of fruit and vegetables a day, include whole grains & fiber and avoid regularly eating high fat or \"junk\" foods? Yes    Do you have any problems taking medications regularly?  No    Do you have any side effects from medications? none    Needs assistance for the following daily activities: medications    Home safety:  none identified     Hearing impairment: Yes, Feel that people are mumbling or not speaking clearly.    In the past 6 months, have you been bothered by leaking of urine? no    Mental Health:    In general, how would you rate your overall mental or emotional health? good    Diabetes Follow-up  Patient reports that she is not checking her blood sugar and also forgot to bring her pill bottles like we had discussed at the last visit.     Patient is checking blood sugars: not lately    Diabetic concerns: None     Symptoms of hypoglycemia (low blood sugar): none. Denies any symptoms.      Paresthesias (numbness or burning in feet) or sores: No     Date of last diabetic eye exam: 8/13/18    BP Readings from Last 2 Encounters:   10/05/18 139/70   08/30/18 " 120/64     Hemoglobin A1C (%)   Date Value   07/26/2018 5.8 (H)   01/16/2018 6.0     LDL Cholesterol Calculated (mg/dL)   Date Value   10/25/2017 104 (H)   09/07/2017 81       Diabetes Management Resources      Hypertension Follow-up  Denies any headaches, chest pain, shortness of breath or syncopal episodes.   BP Readings from Last 3 Encounters:   10/19/18 142/64   10/05/18 139/70   08/30/18 120/64       Outpatient blood pressures periodically     Low Salt Diet: no added salt      Mental Health Follow up   Reports that a close friend recently passed away. Reports that this was hard on her. States that she has been trying to stay busy to get through this. States that [place].     Status since last visit: stable, no concerns.     See PHQ-9 for current symptoms.  Other associated symptoms: None    Complicating factors: none.   Significant life event:  No   Current substance abuse:  None  Anxiety or Panic symptoms:  No    Sleep - good sleep, nocturia x 2-3  Appetite - no issues. Patient is interested in picking up vegetables form the clinic today.   Exercise - tries to go for walks.     Smoking - about 3 cigarettes per day.   Alcohol - denies.   Street drugs - denies.   Marijuana - denies.   Caffeine - about 2 cups in the morning.     PHQ-9  English PHQ-9   Any Language               Problems taking medications regularly: No    Medication side effects: none    Diet: no potassium    Social History   Substance Use Topics     Smoking status: Current Every Day Smoker     Packs/day: 0.25     Years: 40.00     Types: Cigarettes     Last attempt to quit: 10/31/2016     Smokeless tobacco: Never Used     Alcohol use No        Problem list and histories reviewed & adjusted, as indicated.  Additional history: as documented    Patient Active Problem List   Diagnosis     Hyperlipidemia LDL goal <100     ARAUZ (dyspnea on exertion)     COPD (chronic obstructive pulmonary disease)  (H)     Edema     Fatigue     History of MI (myocardial infarction)     Snores     Knee pain, left     Bunion of great toe of left foot     Dystrophic nail     Arthritis     Peripheral vascular disease (H)     Chronic low back pain     Health Care Home     CKD (chronic kidney disease) stage 4, GFR 15-29 ml/min (H)     Abnormal gait     Advance Care Planning     Vitamin D deficiency     Constipation     Hypertension goal BP (blood pressure) < 130/80     Bradycardia     Callus of foot     Other chronic pain     Cataracts, bilateral     Hyperopic astigmatism of both eyes     Presbyopia     Asymptomatic bunion, right     Acquired hypothyroidism     Type 2 diabetes mellitus with diabetic chronic kidney disease (H)     Hypertension associated with diabetes (H)     Hyperlipidemia associated with type 2 diabetes mellitus (H)     Obesity (BMI 30-39.9)     History of sick sinus syndrome     Nephrotic syndrome     Pneumonia     Grief     Cigarette nicotine dependence without complication     HCOM with LVOT     Hyperkalemia     Type 2 diabetes, HbA1C goal < 8% (H)     Smoker     Single kidney     Hypothyroid     Hypertension goal BP (blood pressure) < 140/90     Hyperlipidemia     Diabetic nephropathy (H)     Hypoalbuminemia     Skin lesion of hand     ERRONEOUS ENCOUNTER--DISREGARD     JUSTINE (generalized anxiety disorder)     Past Surgical History:   Procedure Laterality Date     APPENDECTOMY       BLEPHAROPLASTY BILATERAL  9/18/2013    Procedure: BLEPHAROPLASTY BILATERAL;  BILATERAL UPPER EYELID BLEPHAROPLASTY ;  Surgeon: Olayinka Lyon MD;  Location: [facility]     CATARACT IOL, RT/LT Bilateral 2016     CHOLECYSTECTOMY       COLONOSCOPY  7/15/2011    polyps repeat in 5 years     elected term pregnancy       HYSTEROSCOPIC PLACEMENT CONTRACEPTIVE DEVICE       KIDNEY SURGERY  1988    donated left kideny     OVARY SURGERY      left for cyst benign     subclavian stent  august 2010    LUMA Liao       Social History   Substance Use  Topics     Smoking status: Current Every Day Smoker     Packs/day: 0.25     Years: 40.00     Types: Cigarettes     Last attempt to quit: 10/31/2016     Smokeless tobacco: Never Used     Alcohol use No     Family History   Problem Relation Age of Onset     Diabetes Mother      brother, MGM, sister     KIDNEY DISEASE Brother      X2 DM two      Alcohol/Drug Child      daughter     Asthma No family hx of      C.A.D. No family hx of      Hypertension No family hx of      Cerebrovascular Disease No family hx of      Breast Cancer No family hx of      Cancer - colorectal No family hx of      Prostate Cancer No family hx of      Allergies No family hx of      Alzheimer Disease No family hx of      Anesthesia Reaction No family hx of      Arthritis No family hx of      Blood Disease No family hx of      Cancer No family hx of      Cardiovascular No family hx of      Circulatory No family hx of      Congenital Anomalies No family hx of      Connective Tissue Disorder No family hx of      Depression No family hx of      Eye Disorder No family hx of      Genetic Disorder No family hx of      GASTROINTESTINAL DISEASE No family hx of      Genitourinary Problems No family hx of      Gynecology No family hx of      HEART DISEASE No family hx of      Lipids No family hx of      Musculoskeletal Disorder No family hx of      Neurologic Disorder No family hx of      Obesity No family hx of      Osteoporosis No family hx of      Psychotic Disorder No family hx of      Respiratory No family hx of      Thyroid Disease No family hx of      Glaucoma No family hx of      Macular Degeneration No family hx of            Current Outpatient Prescriptions   Medication Sig Dispense Refill     albuterol (PROAIR HFA/PROVENTIL HFA/VENTOLIN HFA) 108 (90 Base) MCG/ACT inhaler Inhale 2 puffs into the lungs every 6 hours as needed for shortness of breath / dyspnea or wheezing 18 g 3     Alcohol Swabs (SM ALCOHOL PREP) 70 % PADS Externally apply 1  pad topically 4 times daily 100 each 11     amLODIPine (NORVASC) 10 MG tablet Take 1 tablet (10 mg) by mouth daily 90 tablet 3     ASPIR-LOW 81 MG EC tablet TAKE 1 TABLET BY MOUTH EVERY  tablet 2     atorvastatin (LIPITOR) 40 MG tablet Take 1 tablet (40 mg) by mouth daily 90 tablet 1     blood glucose monitoring (FREESTYLE LITE) test strip TEST BLOOD SUGAR TWO TIMES A DAY 50 strip 12     blood glucose monitoring (FREESTYLE) lancets Test BS two times daily as directed 100 each 1     blood glucose monitoring (NO BRAND SPECIFIED) meter device kit Use to test blood sugar 2 times daily or as directed. Preferred blood glucose meter OR supplies to accompany: Blood Glucose Monitor Brands: per insurance. 1 kit 0     Blood Pressure Monitor KIT Automatic Blood Pressure Monitor 1 kit 0     Cholecalciferol (VITAMIN D) 2000 units tablet Take 2,000 Units by mouth daily 90 tablet 3     docusate sodium (COLACE) 100 MG capsule Take 1 capsule (100 mg) by mouth 2 times daily 60 capsule 4     Emollient (EUCERIN CALMING DAILY MOIST) CREA Externally apply 1 dose. topically daily 1 Tube 12     ergocalciferol (ERGOCALCIFEROL) 42662 units capsule Take 1 capsule (50,000 Units) by mouth once a week 8 capsule 0     ferrous sulfate (IRON) 325 (65 Fe) MG tablet        fluticasone (FLONASE) 50 MCG/ACT spray Spray 1-2 sprays into both nostrils daily 1 Bottle 1     furosemide (LASIX) 40 MG tablet Take 1.5 tablets (60 mg) by mouth daily 90 tablet 3     hydrALAZINE (APRESOLINE) 25 MG tablet Take 1 tablet (25 mg) by mouth 2 times daily       hydrALAZINE (APRESOLINE) 50 MG tablet        insulin glargine (LANTUS SOLOSTAR) 100 UNIT/ML pen Inject 8 Units Subcutaneous every morning 15 mL 3     insulin pen needle 31G X 6 MM Use as directed 120 each 3     levothyroxine (SYNTHROID/LEVOTHROID) 200 MCG tablet Take 1 tablet (200 mcg) by mouth daily 90 tablet 0     metoprolol succinate (TOPROL-XL) 25 MG 24 hr tablet Take 1 tablet (25 mg) by mouth daily 90  tablet 3     mometasone-formoterol (DULERA) 100-5 MCG/ACT oral inhaler Inhale 2 puffs into the lungs 2 times daily 1 Inhaler 1     nicotine polacrilex (COMMIT) 2 MG lozenge Dissolve 1 lozenge orally every 4 hours as directed. 100 lozenge 2     nitroGLYcerin (NITROSTAT) 0.4 MG sublingual tablet Place 1 tablet (0.4 mg) under the tongue every 5 minutes as needed for chest pain 25 tablet 0     nystatin (MYCOSTATIN) 489130 UNIT/GM POWD Apply 1 g topically 3 times daily as needed 30 g 1     order for DME Equipment being ordered: Diabetic Shoes 1 each 0     order for DME Equipment being ordered: two pairs moderate knee high support hose 2 Device 1     order for DME Equipment being ordered: Compression socks.  Strength:15-20 mmHg 2 each 0     order for DME One wheeled walker with seat and brakes and basket 1 Device 0     oxyCODONE IR (ROXICODONE) 10 MG tablet Take 1 tablet (10 mg) by mouth every 8 hours as needed for moderate to severe pain 90 tablet 0     polyethylene glycol (MIRALAX) powder Take 17 g (1 capful) by mouth daily as needed for constipation 510 g 2     sodium bicarbonate 325 MG tablet Take 2 tablets (650 mg) by mouth 2 times daily 360 tablet 3     sodium bicarbonate 650 MG tablet        TECHLITE PEN NEEDLES 32G X 4 MM        tiotropium (SPIRIVA HANDIHALER) 18 MCG capsule Inhale contents of one capsule daily. 90 capsule 3     Allergies   Allergen Reactions     Contrast Dye Hives and Itching     Clonidine      She had as IP and thinks it made her itchy     Diatrizoate Other (See Comments)     Diltiazem      Severe bradycardia     Iodine-131      Recent Labs   Lab Test  08/03/18   0859  07/26/18   1116  05/16/18   1030   01/16/18   1055   10/25/17   0639  10/20/17   1626   09/07/17   1135  05/10/17   1025   11/02/16   0622   A1C   --   5.8*   --    --   6.0   --   6.6*   --    --   6.4*  6.6*   < >   --    LDL   --    --    --    --    --    --   104*   --    --   81  96   --    --    HDL   --    --    --    --     --    --   67   --    --   75  66   --    --    TRIG   --    --    --    --    --    --   212*   --    --   283*  303*   --    --    ALT   --    --    --    --    --    --   16   --    --   18   --    --   21   CR  3.88*   --   3.86*   < >  3.34*   < >  2.77*   --    < >  2.70*  2.25*   < >  2.64*   GFRESTIMATED  11*   --   11*   < >  14*   < >  17*   --    < >  17*  21*   < >  18*   GFRESTBLACK  14*   --   14*   < >  16*   < >  20*   --    < >  21*  26*   < >  22*   POTASSIUM  4.9   --   5.3   < >  4.8   < >  4.3   --    < >  6.2*  5.5*   < >  5.6*   TSH   --   1.12   --    --    --    --    --   1.04   --   71.77*   --    < >   --     < > = values in this interval not displayed.      BP Readings from Last 3 Encounters:   10/05/18 139/70   08/30/18 120/64   08/16/18 164/82    Wt Readings from Last 3 Encounters:   10/05/18 164 lb 6.4 oz (74.6 kg)   09/28/18 166 lb (75.3 kg)   08/30/18 160 lb (72.6 kg)        Labs reviewed in EPIC  Problem list, Medication list, Allergies, and Medical/Social/Surgical histories reviewed in [facility] and updated as appropriate.     ROS: Constitutional, neuro, ENT, endocrine, pulmonary, cardiac, gastrointestinal, genitourinary, musculoskeletal, integument and psychiatric systems are negative, except as otherwise noted above in the HPI.   OBJECTIVE:                                                    /64  Pulse 68  Temp 98.4  F (36.9  C) (Oral)  Resp 16  Wt 164 lb (74.4 kg)  SpO2 97%  BMI 27.29 kg/m2  Body mass index is 27.29 kg/(m^2).  GENERAL: healthy, alert, well nourished, well hydrated, no distress  EYES: Eyes grossly normal to inspection, extraocular movements - intact, and PERRL  HENT: ear canals- normal; TMs- normal; Nose- normal; Mouth- no ulcers, no lesions  NECK: no tenderness, no adenopathy, no asymmetry, no masses, no stiffness  RESP: lungs clear to auscultation - no rales, no rhonchi, no wheezes  CV: regular rates and rhythm, normal S1 S2, no S3 or S4 and no murmur,  no click or rub - no homans or cords  ABDOMEN: soft, no tenderness, no  hepatosplenomegaly, no masses, normal bowel sounds  MS: extremities- no gross deformities noted, mild dependent edema LLE  SKIN: no suspicious lesions, no rashes  NEURO: strength and tone- normal, sensory exam- grossly normal, mentation- intact, speech- normal, Non focal no aphasia. No facial asymmetry.   BACK: no CVA tenderness, no paralumbar tenderness  LYMPHATICS: ant. cervical- normal, post. cervical- normal, axillary- normal, supraclavicular- normal  Mental Status Assessment:  Appearance:   Appropriate   Eye Contact:   Good   Psychomotor Behavior: Normal   Attitude:   Cooperative   Orientation:   All  Speech   Rate / Production: Normal    Volume:  Normal   Mood:    Normal  Affect:    Appropriate   Thought Content:  Clear   Thought Form:  Coherent  Logical   Insight:    Fair   Attention Span and Concentration: appropriate.   Recent and Remote Memory:  intact  Fund of Knowledge: appropriate  Muscle Strength and Tone: normal     See [facility] notes     Diagnostic Test Results:  Results for orders placed or performed in visit on 10/19/18   Lipid panel reflex to direct LDL Fasting   Result Value Ref Range    Cholesterol 161 <200 mg/dL    Triglycerides 188 (H) <150 mg/dL    HDL Cholesterol 77 >49 mg/dL    LDL Cholesterol Calculated 46 <100 mg/dL    Non HDL Cholesterol 84 <130 mg/dL   Protein  random urine with Creat Ratio   Result Value Ref Range    Protein Random Urine 11.86 g/L    Protein Total Urine g/gr Creatinine 13.23 (H) 0 - 0.2 g/g Cr   Creatinine urine calculation only   Result Value Ref Range    Creatinine Urine 90 mg/dL     *Note: Due to a large number of results and/or encounters for the requested time period, some results have not been displayed. A complete set of results can be found in Results Review.        ASSESSMENT/PLAN:                                                    (Z00.00) Medicare annual wellness visit, subsequent  (primary  encounter diagnosis)  Comment: Patient reports that she is feeling pretty healthy and has no physical concerns at this time.   Plan: Continue with current care with no changes.       (N18.4) CKD (chronic kidney disease) stage 4, GFR 15-29 ml/min (H)  Comment: Patient with chronic kidney disease. Being followed closely by nephrology and remains on the transplant list at this time.   Plan: Continue with recommendations as discussed. Patient is scheduled to nephrology clinic next month.       (E78.5) Hyperlipidemia LDL goal <100  Comment: Routine monitoring.   Cholesterol   Date Value Ref Range Status   10/19/2018 161 <200 mg/dL Final     Plan: Creatinine urine calculation only        Improvement of cholesterol with well managed diabetes, and improved food choices.       (M54.5,  G89.29) Chronic low back pain without sciatica, unspecified back pain laterality  Comment: Stable with pain medication.   Plan: oxyCODONE IR (ROXICODONE) 10 MG tablet        Refill done.       Patient Instructions   -Labs today, will call with results.   -Refills done.   -Bring medications to clinic at the next visit.   -Call clinic with questions or concerns.       Preventive Health Recommendations    Female Ages 65 +    Yearly exam:     See your health care provider every year in order to  o Review health changes.   o Discuss preventive care.    o Review your medicines if your doctor has prescribed any.      You no longer need a yearly Pap test unless you've had an abnormal Pap test in the past 10 years. If you have vaginal symptoms, such as bleeding or discharge, be sure to talk with your provider about a Pap test.      Every 1 to 2 years, have a mammogram.  If you are over 69, talk with your health care provider about whether or not you want to continue having screening mammograms.      Every 10 years, have a colonoscopy. Or, have a yearly FIT test (stool test). These exams will check for colon cancer.       Have a cholesterol test every 5  years, or more often if your doctor advises it.       Have a diabetes test (fasting glucose) every three years. If you are at risk for diabetes, you should have this test more often.       At age 65, have a bone density scan (DEXA) to check for osteoporosis (brittle bone disease).    Shots:    Get a flu shot each year.    Get a tetanus shot every 10 years.    Talk to your doctor about your pneumonia vaccines. There are now two you should receive - Pneumovax (PPSV 23) and Prevnar (PCV 13).    Talk to your pharmacist about the shingles vaccine.    Talk to your doctor about the hepatitis B vaccine.    Nutrition:     Eat at least 5 servings of fruits and vegetables each day.      Eat whole-grain bread, whole-wheat pasta and brown rice instead of white grains and rice.      Get adequate Calcium and Vitamin D.     Lifestyle    Exercise at least 150 minutes a week (30 minutes a day, 5 days a week). This will help you control your weight and prevent disease.      Limit alcohol to one drink per day.      No smoking.       Wear sunscreen to prevent skin cancer.       See your dentist twice a year for an exam and cleaning.      See your eye doctor every 1 to 2 years to screen for conditions such as glaucoma, macular degeneration and cataracts.    I spent Greater than 40 minutes was spent face to face with Kim Anne, with greater than 50 % in counselling and consultation about as noted above.     JUNIOR Lopez [facility] PR[facility]

## 2018-10-19 ENCOUNTER — OFFICE VISIT (OUTPATIENT)
Dept: BEHAVIORAL HEALTH | Facility: CLINIC | Age: 73
End: 2018-10-19
Payer: COMMERCIAL

## 2018-10-19 ENCOUNTER — OFFICE VISIT (OUTPATIENT)
Dept: FAMILY MEDICINE | Facility: CLINIC | Age: 73
End: 2018-10-19
Payer: COMMERCIAL

## 2018-10-19 VITALS
DIASTOLIC BLOOD PRESSURE: 64 MMHG | SYSTOLIC BLOOD PRESSURE: 142 MMHG | BODY MASS INDEX: 27.29 KG/M2 | RESPIRATION RATE: 16 BRPM | OXYGEN SATURATION: 97 % | HEART RATE: 68 BPM | WEIGHT: 164 LBS | TEMPERATURE: 98.4 F

## 2018-10-19 DIAGNOSIS — F41.1 GAD (GENERALIZED ANXIETY DISORDER): Primary | ICD-10-CM

## 2018-10-19 DIAGNOSIS — N18.4 CKD (CHRONIC KIDNEY DISEASE) STAGE 4, GFR 15-29 ML/MIN (H): ICD-10-CM

## 2018-10-19 DIAGNOSIS — M54.50 CHRONIC LOW BACK PAIN WITHOUT SCIATICA, UNSPECIFIED BACK PAIN LATERALITY: ICD-10-CM

## 2018-10-19 DIAGNOSIS — Z00.00 MEDICARE ANNUAL WELLNESS VISIT, SUBSEQUENT: Primary | ICD-10-CM

## 2018-10-19 DIAGNOSIS — G89.29 CHRONIC LOW BACK PAIN WITHOUT SCIATICA, UNSPECIFIED BACK PAIN LATERALITY: ICD-10-CM

## 2018-10-19 DIAGNOSIS — E78.5 HYPERLIPIDEMIA LDL GOAL <100: ICD-10-CM

## 2018-10-19 LAB
CHOLEST SERPL-MCNC: 161 MG/DL
CREAT UR-MCNC: 90 MG/DL
HDLC SERPL-MCNC: 77 MG/DL
LDLC SERPL CALC-MCNC: 46 MG/DL
NONHDLC SERPL-MCNC: 84 MG/DL
PROT UR-MCNC: 11.86 G/L
PROT/CREAT 24H UR: 13.23 G/G CR (ref 0–0.2)
TRIGL SERPL-MCNC: 188 MG/DL

## 2018-10-19 PROCEDURE — 84156 ASSAY OF PROTEIN URINE: CPT | Performed by: INTERNAL MEDICINE

## 2018-10-19 PROCEDURE — 80061 LIPID PANEL: CPT | Performed by: NURSE PRACTITIONER

## 2018-10-19 PROCEDURE — 99215 OFFICE O/P EST HI 40 MIN: CPT | Performed by: NURSE PRACTITIONER

## 2018-10-19 PROCEDURE — 36415 COLL VENOUS BLD VENIPUNCTURE: CPT | Performed by: NURSE PRACTITIONER

## 2018-10-19 PROCEDURE — 90832 PSYTX W PT 30 MINUTES: CPT

## 2018-10-19 RX ORDER — OXYCODONE HYDROCHLORIDE 10 MG/1
10 TABLET ORAL EVERY 8 HOURS PRN
Qty: 90 TABLET | Refills: 0 | Status: SHIPPED | OUTPATIENT
Start: 2018-10-27 | End: 2018-11-23

## 2018-10-19 NOTE — PATIENT INSTRUCTIONS
-Labs today, will call with results.   -Refills done.   -Bring medications to clinic at the next visit.   -Call clinic with questions or concerns.       Preventive Health Recommendations    Female Ages 65 +    Yearly exam:     See your health care provider every year in order to  o Review health changes.   o Discuss preventive care.    o Review your medicines if your doctor has prescribed any.      You no longer need a yearly Pap test unless you've had an abnormal Pap test in the past 10 years. If you have vaginal symptoms, such as bleeding or discharge, be sure to talk with your provider about a Pap test.      Every 1 to 2 years, have a mammogram.  If you are over 69, talk with your health care provider about whether or not you want to continue having screening mammograms.      Every 10 years, have a colonoscopy. Or, have a yearly FIT test (stool test). These exams will check for colon cancer.       Have a cholesterol test every 5 years, or more often if your doctor advises it.       Have a diabetes test (fasting glucose) every three years. If you are at risk for diabetes, you should have this test more often.       At age 65, have a bone density scan (DEXA) to check for osteoporosis (brittle bone disease).    Shots:    Get a flu shot each year.    Get a tetanus shot every 10 years.    Talk to your doctor about your pneumonia vaccines. There are now two you should receive - Pneumovax (PPSV 23) and Prevnar (PCV 13).    Talk to your pharmacist about the shingles vaccine.    Talk to your doctor about the hepatitis B vaccine.    Nutrition:     Eat at least 5 servings of fruits and vegetables each day.      Eat whole-grain bread, whole-wheat pasta and brown rice instead of white grains and rice.      Get adequate Calcium and Vitamin D.     Lifestyle    Exercise at least 150 minutes a week (30 minutes a day, 5 days a week). This will help you control your weight and prevent disease.      Limit alcohol to one drink per  day.      No smoking.       Wear sunscreen to prevent skin cancer.       See your dentist twice a year for an exam and cleaning.      See your eye doctor every 1 to 2 years to screen for conditions such as glaucoma, macular degeneration and cataracts.

## 2018-10-19 NOTE — PROGRESS NOTES
[facility]   2018      Behavioral Health Clinician Progress Note    Patient Name: Kim Anne           Service Type: Individual      Service Location:   Face to Face in Clinic     Session Start Time: 09:10am  Session End Time: 09:30am      Session Length: 16 - 37      Attendees: Patient, PCP and [facility] Intern    Visit Activities (Refresh list every visit): [facility] [employer]    Diagnostic Assessment Date: TBD  Treatment Plan Review Date: TBD  See Flowsheets for today's PHQ-9 and JUSTINE-7 results  Previous PHQ-9:   PHQ-9 SCORE 2018 3/12/2018 2018   Total Score - - -   Total Score - - -   Total Score 0 0 0     Previous JUSTINE-7:   JUSTINE-7 SCORE 2016 3/12/2018 2018   Total Score - - -   Total Score 0 0 0   Total Score - - -       NAE LEVEL:  NAE Score (Last Two) 2013   NEA Raw Score 48 45   Activation Score 80 73.1   NAE Level 4 4       DATA  Extended Session (60+ minutes): No  Interactive Complexity: No  Crisis: No    Treatment Objective(s) Addressed in This Session:  Target Behavior(s): disease management/lifestyle changes manage diabetes better    Grief / Loss: will process grief/loss issues in an adaptive manner  Psychological distress related to Diabetes    Current Stressors / Issues:  Patient met with PCP and [facility] Intern for a co-visit. She shared that her best friend  about a month ago and this has been hard on her emotionally. She is keeping herself busy and spending time with family. Patient reports that her mood is relatively stable, and sleep and appetite have been normal. Patient processed feelings of loss in regard to activities she was once able to do in her earlier years, such as cleaning her house, that she now has difficulty with. Validated patient's feelings and encouraged her to reach out to people who can support her. Patient picked up CSA veggies.   Patient denied SI/SIB thoughts or urges. She will schedule a follow up visit  in one month.    Progress on Treatment Objective(s) / Homework:  Stable - MAINTENANCE (Working to maintain change, with risk of relapse); Intervened by continuing to positively reinforce healthy behavior choice     Motivational Interviewing    MI Intervention: Expressed Empathy/Understanding, Supported Autonomy, Collaboration, Evocation, Open-ended questions and Reflections: simple and complex     Change Talk Expressed by the Patient: Committment to change Activation Taking steps    Provider Response to Change Talk: E - Evoked more info from patient about behavior change, A - Affirmed patient's thoughts, decisions, or attempts at behavior change, R - Reflected patient's change talk and S - Summarized patient's change talk statements      Care Plan review completed: No    Medication Review:  No changes to current psychiatric medication(s)    Medication Compliance:  Yes    Changes in Health Issues:   Yes: please see medical note by PCP JUNIOR Samuel, CNP    Chemical Use Review:   Substance Use: Chemical use reviewed, no active concerns identified      Tobacco Use: Yes,  .  Client reports frequency of use 6 cigarettes daily. Reviewed information and resources for quitting  Reviewed options for assistance and intervention  Provided encouragement to quit      Assessment: Current Emotional / Mental Status (status of significant symptoms):  Risk status (Self / Other harm or suicidal ideation)  Patient denies a history of suicidal ideation, suicide attempts, self-injurious behavior, homicidal ideation, homicidal behavior and and other safety concerns  Patient denies current fears or concerns for personal safety.  Patient denies current or recent suicidal ideation or behaviors.  Patient denies current or recent homicidal ideation or behaviors.  Patient denies current or recent self injurious behavior or ideation.  Patient denies other safety concerns.  A safety and risk management plan has not been developed at this  time, however patient was encouraged to call [facility] / 91 should there be a change in any of these risk factors.    Appearance:   Appropriate   Eye Contact:   Fair   Psychomotor Behavior: Normal   Attitude:   Guarded   Orientation:   All  Speech   Rate / Production: Normal    Volume:  Normal   Mood:    Normal  Affect:    Appropriate   Thought Content:  Clear   Thought Form:  Logical   Insight:    Fair     Diagnoses:  1. JUSTINE (generalized anxiety disorder)        Collateral Reports Completed:  Not Applicable    Plan: (Homework, other):  Patient was given information about behavioral services and encouraged to schedule a follow up appointment with the clinic [facility] as needed.  She was also given information about mental health symptoms and treatment options .  CD Recommendations: Maintain Sobriety.  Xochitl Ponce, [facility] Intern  Reviewed/Supervised by: ANA Bess, LAMINE

## 2018-10-19 NOTE — MR AVS SNAPSHOT
After Visit Summary   10/19/2018    Kim Anne    MRN: [id number]           Patient Information     Date Of Birth          1945        Visit Information        Provider Department      10/19/2018 10:00 AM Ailyn Nieves APRN CNP [facility]        Today's Diagnoses     Hyperlipidemia LDL goal <100        CKD (chronic kidney disease) stage 4, GFR 15-29 ml/min (H)        Chronic low back pain without sciatica, unspecified back pain laterality          Care Instructions    -Labs today, will call with results.   -Refills done.   -Bring medications to clinic at the next visit.   -Call clinic with questions or concerns.             Follow-ups after your visit        Follow-up notes from your care team     Return in about 4 weeks (around 11/16/2018) for Med check, Hypertension, Mental Health.      Your next 10 appointments already scheduled     Oct 19, 2018 10:00 AM CDT   Return Visit with Xochitl Ponce   [facility] ([facility])    [address]  [address]  [facility] [address]   [phone]            Oct 19, 2018 10:00 AM CDT   Return Visit with JUNIOR Bishop CNP   [facility] ([facility])    [address]  [address]  [facility] [address]   [phone]            Nov 16, 2018  8:45 AM CST   Lab with GIOVANNA LAB   [facility] ([facility])    [address]  1st Floor  [place] [place] [facility] [address] [phone]            Nov 16, 2018  9:40 AM CST   (Arrive by 9:10 AM)   Return Visit with Bhargav Lopes MD   [facility] ([facility])    [address]  [address]  [place] [place] [facility] [address]   [phone]            Dec 10, 2018 10:00 AM CST   Nurse Visit with  Dsc Nurse   [facility] ([facility])     "[address]  [address]  [facility] [address] [phone]              Who to contact     If you have questions or need follow up information about today's clinic visit or your schedule please contact [facility] directly at [phone].  Normal or non-critical lab and imaging results will be communicated to you by MyChart, letter or phone within 4 business days after the clinic has received the results. If you do not hear from us within 7 days, please contact the clinic through MyChart or phone. If you have a critical or abnormal lab result, we will notify you by phone as soon as possible.  Submit refill requests through [employer] or call your pharmacy and they will forward the refill request to us. Please allow 3 business days for your refill to be completed.          Additional Information About Your Visit        MyChart Information     [employer] lets you send messages to your doctor, view your test results, renew your prescriptions, schedule appointments and more. To sign up, go to [email or web address] . Click on \"Log in\" on the left side of the screen, which will take you to the Welcome page. Then click on \"Sign up Now\" on the right side of the page.     You will be asked to enter the access code listed below, as well as some personal information. Please follow the directions to create your username and password.     Your access code is: [email or web address]  Expires: 2018  6:30 AM     Your access code will  in 90 days. If you need help or a new code, please call your [facility] or [phone].        Care EveryWhere ID     This is your Care EveryWhere ID. This could be used by other organizations to access your [facility] medical records  [id number]        Your Vitals Were     Pulse Temperature Respirations Pulse Oximetry BMI (Body Mass Index)       68 98.4  F (36.9  C) (Oral) 16 97% 27.29 kg/m2        Blood Pressure from Last 3 Encounters:   10/19/18 " 142/64   10/05/18 139/70   08/30/18 120/64    Weight from Last 3 Encounters:   10/19/18 164 lb (74.4 kg)   10/05/18 164 lb 6.4 oz (74.6 kg)   09/28/18 166 lb (75.3 kg)              We Performed the Following     Lipid panel reflex to direct LDL Fasting     Protein  random urine with Creat Ratio          Today's Medication Changes          These changes are accurate as of 10/19/18  9:37 AM.  If you have any questions, ask your nurse or doctor.               These medicines have changed or have updated prescriptions.        Dose/Directions    oxyCODONE IR 10 MG tablet   Commonly known as:  ROXICODONE   This may have changed:  These instructions start on 10/27/2018. If you are unsure what to do until then, ask your doctor or other care provider.   Used for:  Chronic low back pain without sciatica, unspecified back pain laterality   Changed by:  Ailyn Nieves APRN CNP        Dose:  10 mg   Start taking on:  10/27/2018   Take 1 tablet (10 mg) by mouth every 8 hours as needed for moderate to severe pain   Quantity:  90 tablet   Refills:  0            Where to get your medicines      Some of these will need a paper prescription and others can be bought over the counter.  Ask your nurse if you have questions.     Bring a paper prescription for each of these medications     oxyCODONE IR 10 MG tablet               Information about OPIOIDS     PRESCRIPTION OPIOIDS: WHAT YOU NEED TO KNOW   We gave you an opioid (narcotic) pain medicine. It is important to manage your pain, but opioids are not always the best choice. You should first try all the other options your care team gave you. Take this medicine for as short a time (and as few doses) as possible.    Some activities can increase your pain, such as bandage changes or therapy sessions. It may help to take your pain medicine 30 to 60 minutes before these activities. Reduce your stress by getting enough sleep, working on hobbies you enjoy and practicing relaxation or  meditation. Talk to your care team about ways to manage your pain beyond prescription opioids.    These medicines have risks:    DO NOT drive when on new or higher doses of pain medicine. These medicines can affect your alertness and reaction times, and you could be arrested for driving under the influence (DUI). If you need to use opioids long-term, talk to your care team about driving.    DO NOT operate heavy machinery    DO NOT do any other dangerous activities while taking these medicines.    DO NOT drink any alcohol while taking these medicines.     If the opioid prescribed includes acetaminophen, DO NOT take with any other medicines that contain acetaminophen. Read all labels carefully. Look for the word  acetaminophen  or  Tylenol.  Ask your pharmacist if you have questions or are unsure.    You can get addicted to pain medicines, especially if you have a history of addiction (chemical, alcohol or substance dependence). Talk to your care team about ways to reduce this risk.    All opioids tend to cause constipation. Drink plenty of water and eat foods that have a lot of fiber, such as fruits, vegetables, prune juice, apple juice and high-fiber cereal. Take a laxative (Miralax, milk of magnesia, Colace, Senna) if you don t move your bowels at least every other day. Other side effects include upset stomach, sleepiness, dizziness, throwing up, tolerance (needing more of the medicine to have the same effect), physical dependence and slowed breathing.    Store your pills in a secure place, locked if possible. We will not replace any lost or stolen medicine. If you don t finish your medicine, please throw away (dispose) as directed by your pharmacist. The [place] [employer]ution [employer] has more information about safe disposal: [email or web address]         Primary Care Provider Office Phone # Fax #    JUNIOR Bishop [phone] [facility] [address]  [facility] [address]        Equal Access to Services     ARPIATDIMPLE UNRULY : Hadii alysha redd changjanet Sofranciscaali, wadellada luqadaha, qaybta katannada mechelleroyafahad, kassandra cary giselledeborah merazconchitaanna shoemaker. So [facility] [phone].    ATENCIÓN: Si habla español, tiene a bailey disposición servicios gratuitos de asistencia lingüística. Llame al [phone].    We comply with applicable federal civil rights laws and [place] laws. We do not discriminate on the basis of race, color, national origin, age, disability, sex, sexual orientation, or gender identity.            Thank you!     Thank you for choosing [facility]  for your care. Our goal is always to provide you with excellent care. Hearing back from our patients is one way we can continue to improve our services. Please take a few minutes to complete the written survey that you may receive in the mail after your visit with us. Thank you!             Your Updated Medication List - Protect others around you: Learn how to safely use, store and throw away your medicines at www.disposemymeds.org.          This list is accurate as of 10/19/18  9:37 AM.  Always use your most recent med list.                   Brand Name Dispense Instructions for use Diagnosis    albuterol 108 (90 Base) MCG/ACT inhaler    PROAIR HFA/PROVENTIL HFA/VENTOLIN HFA    18 g    Inhale 2 puffs into the lungs every 6 hours as needed for shortness of breath / dyspnea or wheezing    Chronic obstructive pulmonary disease, unspecified COPD type (H)       amLODIPine 10 MG tablet    NORVASC    90 tablet    Take 1 tablet (10 mg) by mouth daily    Renovascular hypertension       ASPIR-LOW 81 MG EC tablet   Generic drug:  aspirin     120 tablet    TAKE 1 TABLET BY MOUTH EVERY DAY    History of MI (myocardial infarction), Essential hypertension, benign       atorvastatin 40 MG tablet    LIPITOR    90 tablet    Take 1 tablet (40 mg) by mouth daily    Hyperlipidemia LDL goal <100        blood glucose monitoring lancets     100 each    Test BS two times daily as directed    Type 2 diabetes mellitus without complication, with long-term current use of insulin (H)       blood glucose monitoring meter device kit    no brand specified    1 kit    Use to test blood sugar 2 times daily or as directed. Preferred blood glucose meter OR supplies to accompany: Blood Glucose Monitor Brands: per insurance.    Type 2 diabetes mellitus without complication, with long-term current use of insulin (H)       blood glucose monitoring test strip    FREESTYLE LITE    50 strip    TEST BLOOD SUGAR TWO TIMES A DAY    Type 2 diabetes mellitus without complication, with long-term current use of insulin (H)       Blood Pressure Monitor Kit     1 kit    Automatic Blood Pressure Monitor    Renovascular hypertension       docusate sodium 100 MG capsule    COLACE    60 capsule    Take 1 capsule (100 mg) by mouth 2 times daily    Constipation, unspecified constipation type       ergocalciferol 25784 units capsule    ERGOCALCIFEROL    8 capsule    Take 1 capsule (50,000 Units) by mouth once a week    Vitamin D deficiency disease       EUCERIN CALMING DAILY MOIST Crea     1 Tube    Externally apply 1 dose. topically daily    Type II or unspecified type diabetes mellitus with neurological manifestations, not stated as uncontrolled(250.60) (H)       ferrous sulfate 325 (65 Fe) MG tablet    IRON          fluticasone 50 MCG/ACT spray    FLONASE    1 Bottle    Spray 1-2 sprays into both nostrils daily    Acute nasopharyngitis       furosemide 40 MG tablet    LASIX    90 tablet    Take 1.5 tablets (60 mg) by mouth daily    Hyperkalemia, Edema, unspecified type       * hydrALAZINE 50 MG tablet    APRESOLINE          * hydrALAZINE 25 MG tablet    APRESOLINE     Take 1 tablet (25 mg) by mouth 2 times daily    Essential hypertension, Chronic kidney disease, stage 4 (severe) (H)       insulin glargine 100 UNIT/ML injection    LANTUS    15  mL    Inject 8 Units Subcutaneous every morning    Controlled type 2 diabetes mellitus with stage 4 chronic kidney disease, with long-term current use of insulin (H)       * insulin pen needle 31G X 6 MM     120 each    Use as directed    Type 2 diabetes mellitus without complication, with long-term current use of insulin (H)       * TECHLITE PEN NEEDLES 32G X 4 MM   Generic drug:  insulin pen needle           levothyroxine 200 MCG tablet    SYNTHROID/LEVOTHROID    90 tablet    Take 1 tablet (200 mcg) by mouth daily    Other specified hypothyroidism       metoprolol succinate 25 MG 24 hr tablet    TOPROL-XL    90 tablet    Take 1 tablet (25 mg) by mouth daily    Hypertension associated with diabetes (H)       mometasone-formoterol 100-5 MCG/ACT oral inhaler    DULERA    1 Inhaler    Inhale 2 puffs into the lungs 2 times daily    COPD (chronic obstructive pulmonary disease) (H)       nicotine polacrilex 2 MG lozenge    COMMIT    100 lozenge    Dissolve 1 lozenge orally every 4 hours as directed.    Chronic obstructive pulmonary disease, unspecified COPD type (H)       nitroGLYcerin 0.4 MG sublingual tablet    NITROSTAT    25 tablet    Place 1 tablet (0.4 mg) under the tongue every 5 minutes as needed for chest pain    History of MI (myocardial infarction)       nystatin 700974 UNIT/GM Powd    MYCOSTATIN    30 g    Apply 1 g topically 3 times daily as needed    Groin rash       * order for DME     1 Device    One wheeled walker with seat and brakes and basket    Risk for falls       * order for DME     2 each    Equipment being ordered: Compression socks. Strength:15-20 mmHg    Localized edema       order for DME     1 each    Equipment being ordered: Diabetic Shoes    Type 2 diabetes mellitus with stage 5 chronic kidney disease not on chronic dialysis, without long-term current use of insulin (H)       order for DME     2 Device    Equipment being ordered: two pairs moderate knee high support hose    Edema,  unspecified type       oxyCODONE IR 10 MG tablet   Start taking on:  10/27/2018    ROXICODONE    90 tablet    Take 1 tablet (10 mg) by mouth every 8 hours as needed for moderate to severe pain    Chronic low back pain without sciatica, unspecified back pain laterality       polyethylene glycol powder    MIRALAX    510 g    Take 17 g (1 capful) by mouth daily as needed for constipation    Slow transit constipation       SM ALCOHOL PREP 70 % Pads     100 each    Externally apply 1 pad topically 4 times daily    Type 2 diabetes mellitus without complication, with long-term current use of insulin (H)       * sodium bicarbonate 325 MG tablet     360 tablet    Take 2 tablets (650 mg) by mouth 2 times daily    Acidosis, CKD (chronic kidney disease) stage 4, GFR 15-29 ml/min (H)       * sodium bicarbonate 650 MG tablet           tiotropium 18 MCG capsule    SPIRIVA HANDIHALER    90 capsule    Inhale contents of one capsule daily.    Pulmonary emphysema, unspecified emphysema type (H)       vitamin D 2000 units tablet     90 tablet    Take 2,000 Units by mouth daily    Vitamin D deficiency disease       * Notice:  This list has 8 medication(s) that are the same as other medications prescribed for you. Read the directions carefully, and ask your doctor or other care provider to review them with you.

## 2018-10-19 NOTE — MR AVS SNAPSHOT
After Visit Summary   10/19/2018    Kim Anne    MRN: [id number]           Patient Information     Date Of Birth          1945        Visit Information        Provider Department      10/19/2018 10:00 AM Xochitl Pocne [facility]        Today's Diagnoses     JUSTINE (generalized anxiety disorder)    -  1       Follow-ups after your visit        Your next 10 appointments already scheduled     Nov 12, 2018 10:00 AM CST   Return Visit with JUNIOR Bishop CNP   [facility] ([facility])    [address]  [address]  [facility] [id number] [phone]            Nov 12, 2018 10:00 AM CST   Return Visit with LAMINE Chahal   [facility] [place]    [address]  [address]  [facility] [id number] [phone]            Nov 16, 2018  8:45 AM CST   Lab with  LAB   [facility] ([facility])    [address]  1st Floor  [facility] [id number] [phone]            Nov 16, 2018  9:40 AM CST   (Arrive by 9:10 AM)   Return Visit with Bhargav Lopes MD   [facility] ([facility])    [address]  [address]  [facility] [id number] [phone]            Dec 10, 2018 10:00 AM CST   Nurse Visit with  Dsc Nurse   [facility] ([facility])    [address] [place] [address]  [address]  [facility] [id number] [phone]              Who to contact     If you have questions or need follow up information about today's clinic visit or your schedule please contact [facility] directly at [phone].  Normal or non-critical lab and imaging results will be communicated to you by MyChart, letter or phone within 4 business days after the clinic has received the  "results. If you do not hear from us within 7 days, please contact the clinic through [email or web address] or phone. If you have a critical or abnormal lab result, we will notify you by phone as soon as possible.  Submit refill requests through [email or web address] or call your pharmacy and they will forward the refill request to us. Please allow 3 business days for your refill to be completed.          Additional Information About Your Visit        LavanteharTopPatch Information     [email or web address] lets you send messages to your doctor, view your test results, renew your prescriptions, schedule appointments and more. To sign up, go to [email or web address] . Click on \"Log in\" on the left side of the screen, which will take you to the Welcome page. Then click on \"Sign up Now\" on the right side of the page.     You will be asked to enter the access code listed below, as well as some personal information. Please follow the directions to create your username and password.     Your access code is: [email or web address]  Expires: 2018  6:30 AM     Your access code will  in 90 days. If you need help or a new code, please call your [place] clinic or [phone].        Care EveryWhere ID     This is your Care EveryWhere ID. This could be used by other organizations to access your [place] medical records  [id number]         Blood Pressure from Last 3 Encounters:   10/19/18 142/64   10/05/18 139/70   18 120/64    Weight from Last 3 Encounters:   10/19/18 74.4 kg (164 lb)   10/05/18 74.6 kg (164 lb 6.4 oz)   18 75.3 kg (166 lb)              Today, you had the following     No orders found for display         Where to get your medicines      Some of these will need a paper prescription and others can be bought over the counter.  Ask your nurse if you have questions.     Bring a paper prescription for each of these medications     oxyCODONE IR 10 MG tablet          Primary Care Provider Office Phone # Fax #    JUNIOR Bishop [phone] " [phone]       [address]  [facility] [address]        Equal Access to Services     ARPITADIMPLE UNRULY : Hadii alysha redd prerna [place], wadellada luqadaha, qaybta kaalmada stephanie, kassandra raimundoin hayaan laurenfermin pendleton laYrisanastasiia shoemaker. So [facility] [phone].    ATENCIÓN: Si habla español, tiene a bailey disposición servicios gratuitos de asistencia lingüística. Llame al [phone].    We comply with applicable federal civil rights laws and [place] laws. We do not discriminate on the basis of race, color, national origin, age, disability, sex, sexual orientation, or gender identity.            Thank you!     Thank you for choosing [facility]  for your care. Our goal is always to provide you with excellent care. Hearing back from our patients is one way we can continue to improve our services. Please take a few minutes to complete the written survey that you may receive in the mail after your visit with us. Thank you!             Your Updated Medication List - Protect others around you: Learn how to safely use, store and throw away your medicines at www.disposemymeds.org.          This list is accurate as of 10/19/18 11:59 PM.  Always use your most recent med list.                   Brand Name Dispense Instructions for use Diagnosis    albuterol 108 (90 Base) MCG/ACT inhaler    PROAIR HFA/PROVENTIL HFA/VENTOLIN HFA    18 g    Inhale 2 puffs into the lungs every 6 hours as needed for shortness of breath / dyspnea or wheezing    Chronic obstructive pulmonary disease, unspecified COPD type (H)       amLODIPine 10 MG tablet    NORVASC    90 tablet    Take 1 tablet (10 mg) by mouth daily    Renovascular hypertension       ASPIR-LOW 81 MG EC tablet   Generic drug:  aspirin     120 tablet    TAKE 1 TABLET BY MOUTH EVERY DAY    History of MI (myocardial infarction), Essential hypertension, benign       atorvastatin 40 MG tablet    LIPITOR    90 tablet    Take 1 tablet (40 mg) by mouth daily     Hyperlipidemia LDL goal <100       blood glucose monitoring lancets     100 each    Test BS two times daily as directed    Type 2 diabetes mellitus without complication, with long-term current use of insulin (H)       blood glucose monitoring meter device kit    no brand specified    1 kit    Use to test blood sugar 2 times daily or as directed. Preferred blood glucose meter OR supplies to accompany: Blood Glucose Monitor Brands: per insurance.    Type 2 diabetes mellitus without complication, with long-term current use of insulin (H)       blood glucose monitoring test strip    FREESTYLE LITE    50 strip    TEST BLOOD SUGAR TWO TIMES A DAY    Type 2 diabetes mellitus without complication, with long-term current use of insulin (H)       Blood Pressure Monitor Kit     1 kit    Automatic Blood Pressure Monitor    Renovascular hypertension       docusate sodium 100 MG capsule    COLACE    60 capsule    Take 1 capsule (100 mg) by mouth 2 times daily    Constipation, unspecified constipation type       EUCERIN CALMING DAILY MOIST Crea     1 Tube    Externally apply 1 dose. topically daily    Type II or unspecified type diabetes mellitus with neurological manifestations, not stated as uncontrolled(250.60) (H)       ferrous sulfate 325 (65 Fe) MG tablet    IRON          fluticasone 50 MCG/ACT spray    FLONASE    1 Bottle    Spray 1-2 sprays into both nostrils daily    Acute nasopharyngitis       furosemide 40 MG tablet    LASIX    90 tablet    Take 1.5 tablets (60 mg) by mouth daily    Hyperkalemia, Edema, unspecified type       * hydrALAZINE 50 MG tablet    APRESOLINE          * hydrALAZINE 25 MG tablet    APRESOLINE     Take 1 tablet (25 mg) by mouth 2 times daily    Essential hypertension, Chronic kidney disease, stage 4 (severe) (H)       insulin glargine 100 UNIT/ML injection    LANTUS    15 mL    Inject 8 Units Subcutaneous every morning    Controlled type 2 diabetes mellitus with stage 4 chronic kidney disease, with  long-term current use of insulin (H)       * insulin pen needle 31G X 6 MM     120 each    Use as directed    Type 2 diabetes mellitus without complication, with long-term current use of insulin (H)       * TECHLITE PEN NEEDLES 32G X 4 MM   Generic drug:  insulin pen needle           levothyroxine 200 MCG tablet    SYNTHROID/LEVOTHROID    90 tablet    Take 1 tablet (200 mcg) by mouth daily    Other specified hypothyroidism       metoprolol succinate 25 MG 24 hr tablet    TOPROL-XL    90 tablet    Take 1 tablet (25 mg) by mouth daily    Hypertension associated with diabetes (H)       mometasone-formoterol 100-5 MCG/ACT oral inhaler    DULERA    1 Inhaler    Inhale 2 puffs into the lungs 2 times daily    COPD (chronic obstructive pulmonary disease) (H)       nicotine polacrilex 2 MG lozenge    COMMIT    100 lozenge    Dissolve 1 lozenge orally every 4 hours as directed.    Chronic obstructive pulmonary disease, unspecified COPD type (H)       nitroGLYcerin 0.4 MG sublingual tablet    NITROSTAT    25 tablet    Place 1 tablet (0.4 mg) under the tongue every 5 minutes as needed for chest pain    History of MI (myocardial infarction)       nystatin 824800 UNIT/GM Powd    MYCOSTATIN    30 g    Apply 1 g topically 3 times daily as needed    Groin rash       * order for DME     1 Device    One wheeled walker with seat and brakes and basket    Risk for falls       * order for DME     2 each    Equipment being ordered: Compression socks. Strength:15-20 mmHg    Localized edema       order for DME     1 each    Equipment being ordered: Diabetic Shoes    Type 2 diabetes mellitus with stage 5 chronic kidney disease not on chronic dialysis, without long-term current use of insulin (H)       order for DME     2 Device    Equipment being ordered: two pairs moderate knee high support hose    Edema, unspecified type       oxyCODONE IR 10 MG tablet    ROXICODONE    90 tablet    Take 1 tablet (10 mg) by mouth every 8 hours as needed for  moderate to severe pain    Chronic low back pain without sciatica, unspecified back pain laterality       polyethylene glycol powder    MIRALAX    510 g    Take 17 g (1 capful) by mouth daily as needed for constipation    Slow transit constipation       SM ALCOHOL PREP 70 % Pads     100 each    Externally apply 1 pad topically 4 times daily    Type 2 diabetes mellitus without complication, with long-term current use of insulin (H)       * sodium bicarbonate 325 MG tablet     360 tablet    Take 2 tablets (650 mg) by mouth 2 times daily    Acidosis, CKD (chronic kidney disease) stage 4, GFR 15-29 ml/min (H)       * sodium bicarbonate 650 MG tablet           tiotropium 18 MCG capsule    SPIRIVA HANDIHALER    90 capsule    Inhale contents of one capsule daily.    Pulmonary emphysema, unspecified emphysema type (H)       vitamin D 2000 units tablet     90 tablet    Take 2,000 Units by mouth daily    Vitamin D deficiency disease       * Notice:  This list has 8 medication(s) that are the same as other medications prescribed for you. Read the directions carefully, and ask your doctor or other care provider to review them with you.

## 2018-10-22 DIAGNOSIS — E55.9 VITAMIN D DEFICIENCY DISEASE: ICD-10-CM

## 2018-10-22 RX ORDER — ERGOCALCIFEROL 1.25 MG/1
50000 CAPSULE ORAL WEEKLY
Qty: 8 CAPSULE | Refills: 0 | Status: SHIPPED | OUTPATIENT
Start: 2018-10-22 | End: 2019-04-03

## 2018-10-22 NOTE — TELEPHONE ENCOUNTER
Medication Refill Request    Medication(s) requested:  ergocalciferol (ERGOCALCIFEROL) 31410 units capsule    Last Office Visit: 10/19/18  Next Office Visit: 11/12/18  Labs: up to date     Rx approved and refilled per [facility] [place] protocol    Kallie Pritchard RN  10/22/18  10:44 AM

## 2018-11-09 NOTE — PROGRESS NOTES
"SUBJECTIVE:                                                    Kim Anne is a 73 year old female who presents to clinic today for the following health issues:  [facility]: ***    Diabetes Follow-up      Patient is checking blood sugars: { :961914}    Diabetic concerns: {Diabetic Concerns:981230::\"None\"}     Symptoms of hypoglycemia (low blood sugar): { :252759::\"none\"}     Paresthesias (numbness or burning in feet) or sores: { :658227::\"No\"}     Date of last diabetic eye exam: ***    Diabetes Management Resources    Hyperlipidemia Follow-Up      Rate your low fat/cholesterol diet?: { :008546::\"good\"}    Taking statin?  { :951532::\"No\"}    Other lipid medications/supplements?:  { :225590::\"none\"}    Hypertension Follow-up      Outpatient blood pressures {ISCHECKIN}    Low Salt Diet: { :048346::\"no added salt\"}    BP Readings from Last 2 Encounters:   10/19/18 142/64   10/05/18 139/70     Hemoglobin A1C (%)   Date Value   2018 5.8 (H)   2018 6.0     LDL Cholesterol Calculated (mg/dL)   Date Value   10/19/2018 46   10/25/2017 104 (H)     Chronic Kidney Disease Follow-up      Current NSAID use?  {CKD NSAID Use:539194}    Chronic Pain Follow-Up       Type / Location of Pain: Back Pain    Analgesia/pain control:       Recent changes:  { :885504488}      Overall control: { :182354::\"Tolerable with discomfort\"}  Activity level/function:      Daily activities:  { :549036}    Work:  { :599720}  Adverse effects:  { :900749::\"No\"}  Adherance    Taking medication as directed?  { :178488::\"Yes\"}    Participating in other treatments: { :528818::\"yes\"}  Risk Factors:    Sleep:  { :165976}    Mood/anxiety:  { :434576::\"controlled\"}    Recent family or social stressors:  { :931119::\"none noted\"}    Other aggravating factors: { :709123::\"none\"}  PHQ-9 SCORE 2018 3/12/2018 2018   Total Score - - -   Total Score - - -   Total Score 0 0 0     JUSTINE-7 SCORE 2016 3/12/2018 2018   Total Score - - - " "  Total Score 0 0 0   Total Score - - -     Encounter-Level CSA - 04/10/2018:          Controlled Substance Agreement - Scan on 4/18/2018  3:13 PM : CONTROLLED SUBSTANCE AGREEMENT (below)            Encounter-Level CSA - 04/10/2018:          Controlled Substance Agreement - Scan on 2/16/2017  2:30 PM : CONTROLLED SUBSTANCE AGREEMENT (below)            Encounter-Level CSA - 04/10/2018:          Controlled Substance Agreement - Scan on 1/16/2017  6:55 PM : CONTROLLED SUBSTANCE AGREEMENT (below)            Encounter-Level CSA - 04/10/2018:          Controlled Substance Agreement - Scan on 3/8/2016  4:25 AM : CONTROLLED SUBSTANCE AGREEMENT 03/07/16 (below)            Encounter-Level CSA - 04/10/2018:          Controlled Substance Agreement - Scan on 3/5/2015  9:09 AM : Controlled Substance Agreement 03/04/15 (below)                Amount of exercise or physical activity: {Exercise frequency days per week:602077}    Problems taking medications regularly: {Med Problems:553277::\"No\"}    Medication side effects: {CHRONIC MED SIDE EFFECTS:007401::\"none\"}    Diet: { :494468}        Mental Health Follow up ***    Status since last visit: ***    See PHQ-9 for current symptoms.  Other associated symptoms: None    Complicating factors: ***  Significant life event:  No   Current substance abuse:  None  Anxiety or Panic symptoms:  No    Sleep - ***  Appetite - ***  Exercise - ***    Smoking - ***  Alcohol - ***  Street drugs - ***  Marijuana - ***  Caffeine - ***    PHQ-9  English PHQ-9   Any Language           {additional problems for provider to add:868000}      Problems taking medications regularly: {Med Problems:884848::\"No\"}    Medication side effects: {CHRONIC MED SIDE EFFECTS:387122::\"none\"}    Diet: { :182464}    Social History   Substance Use Topics     Smoking status: Current Every Day Smoker     Packs/day: 0.25     Years: 40.00     Types: Cigarettes     Last attempt to quit: 10/31/2016     Smokeless tobacco: Never Used "     Alcohol use No        Problem list and histories reviewed & adjusted, as indicated.  Additional history: as documented    Patient Active Problem List   Diagnosis     Hyperlipidemia LDL goal <100     ARAUZ (dyspnea on exertion)     COPD (chronic obstructive pulmonary disease) (H)     Edema     Fatigue     History of MI (myocardial infarction)     Snores     Knee pain, left     Bunion of great toe of left foot     Dystrophic nail     Arthritis     Peripheral vascular disease (H)     Chronic low back pain     Health Care Home     CKD (chronic kidney disease) stage 4, GFR 15-29 ml/min (H)     Abnormal gait     Advance Care Planning     Vitamin D deficiency     Constipation     Hypertension goal BP (blood pressure) < 130/80     Bradycardia     Callus of foot     Other chronic pain     Cataracts, bilateral     Hyperopic astigmatism of both eyes     Presbyopia     Asymptomatic bunion, right     Acquired hypothyroidism     Type 2 diabetes mellitus with diabetic chronic kidney disease (H)     Hypertension associated with diabetes (H)     Hyperlipidemia associated with type 2 diabetes mellitus (H)     Obesity (BMI 30-39.9)     History of sick sinus syndrome     Nephrotic syndrome     Pneumonia     Grief     Cigarette nicotine dependence without complication     HCOM with LVOT     Hyperkalemia     Type 2 diabetes, HbA1C goal < 8% (H)     Smoker     Single kidney     Hypothyroid     Hypertension goal BP (blood pressure) < 140/90     Hyperlipidemia     Diabetic nephropathy (H)     Hypoalbuminemia     Skin lesion of hand     ERRONEOUS ENCOUNTER--DISREGARD     JUSTINE (generalized anxiety disorder)     Past Surgical History:   Procedure Laterality Date     APPENDECTOMY       BLEPHAROPLASTY BILATERAL  9/18/2013    Procedure: BLEPHAROPLASTY BILATERAL;  BILATERAL UPPER EYELID BLEPHAROPLASTY ;  Surgeon: Olayinka Lyon MD;  Location: [facility]     CATARACT IOL, RT/LT Bilateral 2016     CHOLECYSTECTOMY       COLONOSCOPY  7/15/2011     polyps repeat in 5 years     elected term pregnancy       HYSTEROSCOPIC PLACEMENT CONTRACEPTIVE DEVICE       KIDNEY SURGERY      donated left kideny     OVARY SURGERY      left for cyst benign     subclavian stent  2010    [facility]       Social History   Substance Use Topics     Smoking status: Current Every Day Smoker     Packs/day: 0.25     Years: 40.00     Types: Cigarettes     Last attempt to quit: 10/31/2016     Smokeless tobacco: Never Used     Alcohol use No     Family History   Problem Relation Age of Onset     Diabetes Mother      brother, MGM, sister     KIDNEY DISEASE Brother      X2 DM two      Alcohol/Drug Child      daughter     Asthma No family hx of      C.A.D. No family hx of      Hypertension No family hx of      Cerebrovascular Disease No family hx of      Breast Cancer No family hx of      Cancer - colorectal No family hx of      Prostate Cancer No family hx of      Allergies No family hx of      Alzheimer Disease No family hx of      Anesthesia Reaction No family hx of      Arthritis No family hx of      Blood Disease No family hx of      Cancer No family hx of      Cardiovascular No family hx of      Circulatory No family hx of      Congenital Anomalies No family hx of      Connective Tissue Disorder No family hx of      Depression No family hx of      Eye Disorder No family hx of      Genetic Disorder No family hx of      GASTROINTESTINAL DISEASE No family hx of      Genitourinary Problems No family hx of      Gynecology No family hx of      HEART DISEASE No family hx of      Lipids No family hx of      Musculoskeletal Disorder No family hx of      Neurologic Disorder No family hx of      Obesity No family hx of      Osteoporosis No family hx of      Psychotic Disorder No family hx of      Respiratory No family hx of      Thyroid Disease No family hx of      Glaucoma No family hx of      Macular Degeneration No family hx of            Current Outpatient Prescriptions    Medication Sig Dispense Refill     albuterol (PROAIR HFA/PROVENTIL HFA/VENTOLIN HFA) 108 (90 Base) MCG/ACT inhaler Inhale 2 puffs into the lungs every 6 hours as needed for shortness of breath / dyspnea or wheezing 18 g 3     Alcohol Swabs (SM ALCOHOL PREP) 70 % PADS Externally apply 1 pad topically 4 times daily 100 each 11     amLODIPine (NORVASC) 10 MG tablet Take 1 tablet (10 mg) by mouth daily 90 tablet 3     ASPIR-LOW 81 MG EC tablet TAKE 1 TABLET BY MOUTH EVERY  tablet 2     atorvastatin (LIPITOR) 40 MG tablet Take 1 tablet (40 mg) by mouth daily 90 tablet 1     blood glucose monitoring (FREESTYLE LITE) test strip TEST BLOOD SUGAR TWO TIMES A DAY 50 strip 12     blood glucose monitoring (FREESTYLE) lancets Test BS two times daily as directed 100 each 1     blood glucose monitoring (NO BRAND SPECIFIED) meter device kit Use to test blood sugar 2 times daily or as directed. Preferred blood glucose meter OR supplies to accompany: Blood Glucose Monitor Brands: per insurance. 1 kit 0     Blood Pressure Monitor KIT Automatic Blood Pressure Monitor 1 kit 0     Cholecalciferol (VITAMIN D) 2000 units tablet Take 2,000 Units by mouth daily 90 tablet 3     docusate sodium (COLACE) 100 MG capsule Take 1 capsule (100 mg) by mouth 2 times daily 60 capsule 4     Emollient (EUCERIN CALMING DAILY MOIST) CREA Externally apply 1 dose. topically daily 1 Tube 12     ergocalciferol (ERGOCALCIFEROL) 51711 units capsule Take 1 capsule (50,000 Units) by mouth once a week 8 capsule 0     ferrous sulfate (IRON) 325 (65 Fe) MG tablet        fluticasone (FLONASE) 50 MCG/ACT spray Spray 1-2 sprays into both nostrils daily 1 Bottle 1     furosemide (LASIX) 40 MG tablet Take 1.5 tablets (60 mg) by mouth daily 90 tablet 3     hydrALAZINE (APRESOLINE) 25 MG tablet Take 1 tablet (25 mg) by mouth 2 times daily       hydrALAZINE (APRESOLINE) 50 MG tablet        insulin glargine (LANTUS SOLOSTAR) 100 UNIT/ML pen Inject 8 Units  Subcutaneous every morning 15 mL 3     insulin pen needle 31G X 6 MM Use as directed 120 each 3     levothyroxine (SYNTHROID/LEVOTHROID) 200 MCG tablet Take 1 tablet (200 mcg) by mouth daily 90 tablet 0     metoprolol succinate (TOPROL-XL) 25 MG 24 hr tablet Take 1 tablet (25 mg) by mouth daily 90 tablet 3     mometasone-formoterol (DULERA) 100-5 MCG/ACT oral inhaler Inhale 2 puffs into the lungs 2 times daily 1 Inhaler 1     nicotine polacrilex (COMMIT) 2 MG lozenge Dissolve 1 lozenge orally every 4 hours as directed. 100 lozenge 2     nitroGLYcerin (NITROSTAT) 0.4 MG sublingual tablet Place 1 tablet (0.4 mg) under the tongue every 5 minutes as needed for chest pain 25 tablet 0     nystatin (MYCOSTATIN) 490461 UNIT/GM POWD Apply 1 g topically 3 times daily as needed 30 g 1     order for DME Equipment being ordered: Diabetic Shoes 1 each 0     order for DME Equipment being ordered: two pairs moderate knee high support hose 2 Device 1     order for DME Equipment being ordered: Compression socks.  Strength:15-20 mmHg 2 each 0     order for DME One wheeled walker with seat and brakes and basket 1 Device 0     oxyCODONE IR (ROXICODONE) 10 MG tablet Take 1 tablet (10 mg) by mouth every 8 hours as needed for moderate to severe pain 90 tablet 0     polyethylene glycol (MIRALAX) powder Take 17 g (1 capful) by mouth daily as needed for constipation 510 g 2     sodium bicarbonate 325 MG tablet Take 2 tablets (650 mg) by mouth 2 times daily 360 tablet 3     sodium bicarbonate 650 MG tablet        TECHLITE PEN NEEDLES 32G X 4 MM        tiotropium (SPIRIVA HANDIHALER) 18 MCG capsule Inhale contents of one capsule daily. 90 capsule 3     Allergies   Allergen Reactions     Contrast Dye Hives and Itching     Clonidine      She had as IP and thinks it made her itchy     Diatrizoate Other (See Comments)     Diltiazem      Severe bradycardia     Iodine-131      Recent Labs   Lab Test  10/19/18   0939  08/03/18   0859  07/26/18   1116   05/16/18   1030   01/16/18   1055   10/25/17   0639  10/20/17   1626   09/07/17   1135   11/02/16   0622   A1C   --    --   5.8*   --    --   6.0   --   6.6*   --    --   6.4*   < >   --    LDL  46   --    --    --    --    --    --   104*   --    --   81   < >   --    HDL  77   --    --    --    --    --    --   67   --    --   75   < >   --    TRIG  188*   --    --    --    --    --    --   212*   --    --   283*   < >   --    ALT   --    --    --    --    --    --    --   16   --    --   18   --   21   CR   --   3.88*   --   3.86*   < >  3.34*   < >  2.77*   --    < >  2.70*   < >  2.64*   GFRESTIMATED   --   11*   --   11*   < >  14*   < >  17*   --    < >  17*   < >  18*   GFRESTBLACK   --   14*   --   14*   < >  16*   < >  20*   --    < >  21*   < >  22*   POTASSIUM   --   4.9   --   5.3   < >  4.8   < >  4.3   --    < >  6.2*   < >  5.6*   TSH   --    --   1.12   --    --    --    --    --   1.04   --   71.77*   < >   --     < > = values in this interval not displayed.      BP Readings from Last 3 Encounters:   10/19/18 142/64   10/05/18 139/70   08/30/18 120/64    Wt Readings from Last 3 Encounters:   10/19/18 164 lb (74.4 kg)   10/05/18 164 lb 6.4 oz (74.6 kg)   09/28/18 166 lb (75.3 kg)        Labs reviewed in EPIC  Problem list, Medication list, Allergies, and Medical/Social/Surgical histories reviewed in [facility] and updated as appropriate.     ROS: Constitutional, neuro, ENT, endocrine, pulmonary, cardiac, gastrointestinal, genitourinary, musculoskeletal, integument and psychiatric systems are negative, except as otherwise noted above in the HPI.    {Scribe Signature}   OBJECTIVE:                                                    There were no vitals taken for this visit.  There is no height or weight on file to calculate BMI.  GENERAL: healthy, alert, well nourished, well hydrated, no distress  EYES: Eyes grossly normal to inspection, extraocular movements - intact, and PERRL  HENT: ear canals- normal;  "TMs- normal; Nose- normal; Mouth- no ulcers, no lesions  NECK: no tenderness, no adenopathy, no asymmetry, no masses, no stiffness; thyroid- normal to palpation  RESP: lungs clear to auscultation - no rales, no rhonchi, no wheezes  CV: regular rates and rhythm, normal S1 S2, no S3 or S4 and no murmur, no click or rub - no homans or cords  ABDOMEN: soft, no tenderness, no  hepatosplenomegaly, no masses, normal bowel sounds  MS: extremities- no gross deformities noted, no edema  SKIN: no suspicious lesions, no rashes  NEURO: strength and tone- normal, sensory exam- grossly normal, mentation- intact, speech- normal, reflexes- symmetric  Non focal no aphasia. No facial asymmetry. Finger to nose, rapid alteration, finger thumb opposition, heel knee shin are normal.  BACK: no CVA tenderness, no paralumbar tenderness  LYMPHATICS: ant. cervical- normal, post. cervical- normal, axillary- normal, supraclavicular- normal, inguinal- normal  Mental Status Assessment:  Appearance:   {Appearance:410951::\"Appropriate \"}  Eye Contact:   {Eye Contact:495418::\"Good \"}  Psychomotor Behavior: {Psychomotor Behavior:659179::\"Normal \"}  Attitude:   {Attitude:133280::\"Cooperative \"}  Orientation:   {Orientation:266128::\"All\"}  Speech   Rate / Production: {Speech Rate/Production:242968::\"Normal \"}   Volume:  {Speech Volume:505941::\"Normal \"}  Mood:    {Mood:852870::\"Normal\"}  Affect:    {Affect:016818::\"Appropriate \"}  Thought Content:  {Thought Content:881334::\"Clear \"}  Thought Form:  {Thought Form:844139::\"Coherent \",\"Logical \"}  Insight:    {Insight:096364::\"Good \"}  Attention Span and Concentration:  limited  Recent and Remote Memory:  intact  Fund of Knowledge: appropriate  Muscle Strength and Tone: normal   Suicidal Ideation: reports no thoughts, no intention  Hallucination: {YES / NO:465749::\"Yes\"}  Paranoid-{YES / NO:084459::\"Yes\"}  Manic-{YES / NO:921814::\"Yes\"}  Panic-{YES / NO:028666::\"Yes\"}  Self harm-{YES / " "NO:699093::\"Yes\"}      See [facility] notes ***    {Diagnostic Test Results:754343::\"Diagnostic Test Results:\",\"none \"}     ASSESSMENT/PLAN:                                                    ***    Patient Instructions:  ***        {Provider Disclaimer}    JUNIOR Lopez [facility] PRIMARY CARE    {make me the author button}         "

## 2018-11-12 ENCOUNTER — OFFICE VISIT (OUTPATIENT)
Dept: FAMILY MEDICINE | Facility: CLINIC | Age: 73
End: 2018-11-12
Payer: COMMERCIAL

## 2018-11-12 DIAGNOSIS — Z53.9 NO SHOW: Primary | ICD-10-CM

## 2018-11-12 PROCEDURE — 99207 ZZC NO SHOW FOR SCHEDULED APPT: CPT | Performed by: FAMILY MEDICINE

## 2018-11-12 NOTE — MR AVS SNAPSHOT
After Visit Summary   11/12/2018    Kim Anne    MRN: [id number]           Patient Information     Date Of Birth          1945        Today's Diagnoses     NO SHOW    -  1       Follow-ups after your visit        Your next 10 appointments already scheduled     Nov 23, 2018  8:30 AM CST   Return Visit with JUNIOR Bishop CNP   [facility] ([facility])    [address]  [address]  [facility] [address] [phone]            Nov 23, 2018  8:30 AM CST   Return Visit with LAMINE Chahal   [facility] ([facility])    [address]  [address]  [facility] [id number] [phone]            Dec 10, 2018 10:00 AM CST   Nurse Visit with  Dsc Nurse   [facility] and Infectious Diseases ([facility])    [address]  [address]  [facility] [address] [phone]            Jan 30, 2019 10:00 AM CST   Lab with  LAB   [facility] ([facility])    [address]  1st Floor  [facility] [address] [phone]            Jan 30, 2019 10:50 AM CST   (Arrive by 10:20 AM)   Return Visit with Rasheeda Liz MD   [facility] Nephrology ([facility])    [address]  [address]  [facility] [id number] [phone]              Who to contact     If you have questions or need follow up information about today's clinic visit or your schedule please contact [facility] directly at [phone].  Normal or non-critical lab and imaging results will be communicated to you by MyChart, letter or phone within 4 business days after the clinic has received the results. If you do not hear from us within 7 days, please contact the clinic through MyChart or phone. If you have a critical or abnormal lab result, we  will notify you by phone as soon as possible.  Submit refill requests through [employer] or call your pharmacy and they will forward the refill request to us. Please allow 3 business days for your refill to be completed.          Additional Information About Your Visit        Care EveryWhere ID     This is your Care EveryWhere ID. This could be used by other organizations to access your [place] medical records  [id number]         Blood Pressure from Last 3 Encounters:   10/19/18 142/64   10/05/18 139/70   08/30/18 120/64    Weight from Last 3 Encounters:   11/16/18 167 lb 12.8 oz (76.1 kg)   10/19/18 164 lb (74.4 kg)   10/05/18 164 lb 6.4 oz (74.6 kg)              Today, you had the following     No orders found for display       Primary Care Provider Office Phone # Fax #    Ailyn JUNIOR Pickett [phone]       [address]  [facility] [address]        Equal Access to Services     DIMPLE TOLLIVER : Hadii aad ku hadasho Soomaali, waaxda luqadaha, qaybta kaalmada adeegyada, waxay idiin hayaan adeeg kharaanna muir'anastasiia . So [facility] [phone].    ATENCIÓN: Si habla español, tiene a bailey disposición servicios gratuitos de asistencia lingüística. Llame al [phone].    We comply with applicable federal civil rights laws and [place] laws. We do not discriminate on the basis of race, color, national origin, age, disability, sex, sexual orientation, or gender identity.            Thank you!     Thank you for choosing [facility]  for your care. Our goal is always to provide you with excellent care. Hearing back from our patients is one way we can continue to improve our services. Please take a few minutes to complete the written survey that you may receive in the mail after your visit with us. Thank you!             Your Updated Medication List - Protect others around you: Learn how to safely use, store and throw away your medicines at www.disposemymeds.org.           This list is accurate as of 11/12/18 11:59 PM.  Always use your most recent med list.                   Brand Name Dispense Instructions for use Diagnosis    albuterol 108 (90 Base) MCG/ACT inhaler    PROAIR HFA/PROVENTIL HFA/VENTOLIN HFA    18 g    Inhale 2 puffs into the lungs every 6 hours as needed for shortness of breath / dyspnea or wheezing    Chronic obstructive pulmonary disease, unspecified COPD type (H)       amLODIPine 10 MG tablet    NORVASC    90 tablet    Take 1 tablet (10 mg) by mouth daily    Renovascular hypertension       ASPIR-LOW 81 MG EC tablet   Generic drug:  aspirin     120 tablet    TAKE 1 TABLET BY MOUTH EVERY DAY    History of MI (myocardial infarction), Essential hypertension, benign       atorvastatin 40 MG tablet    LIPITOR    90 tablet    Take 1 tablet (40 mg) by mouth daily    Hyperlipidemia LDL goal <100       blood glucose monitoring lancets     100 each    Test BS two times daily as directed    Type 2 diabetes mellitus without complication, with long-term current use of insulin (H)       blood glucose monitoring meter device kit    no brand specified    1 kit    Use to test blood sugar 2 times daily or as directed. Preferred blood glucose meter OR supplies to accompany: Blood Glucose Monitor Brands: per insurance.    Type 2 diabetes mellitus without complication, with long-term current use of insulin (H)       blood glucose monitoring test strip    FREESTYLE LITE    50 strip    TEST BLOOD SUGAR TWO TIMES A DAY    Type 2 diabetes mellitus without complication, with long-term current use of insulin (H)       Blood Pressure Monitor Kit     1 kit    Automatic Blood Pressure Monitor    Renovascular hypertension       docusate sodium 100 MG capsule    COLACE    60 capsule    Take 1 capsule (100 mg) by mouth 2 times daily    Constipation, unspecified constipation type       ergocalciferol 84610 units capsule    ERGOCALCIFEROL    8 capsule    Take 1 capsule (50,000 Units) by mouth once  a week    Vitamin D deficiency disease       EUCERIN CALMING DAILY MOIST Crea     1 Tube    Externally apply 1 dose. topically daily    Type II or unspecified type diabetes mellitus with neurological manifestations, not stated as uncontrolled(250.60) (H)       ferrous sulfate 325 (65 Fe) MG tablet    IRON          fluticasone 50 MCG/ACT spray    FLONASE    1 Bottle    Spray 1-2 sprays into both nostrils daily    Acute nasopharyngitis       furosemide 40 MG tablet    LASIX    90 tablet    Take 1.5 tablets (60 mg) by mouth daily    Hyperkalemia, Edema, unspecified type       * hydrALAZINE 50 MG tablet    APRESOLINE          * hydrALAZINE 25 MG tablet    APRESOLINE     Take 1 tablet (25 mg) by mouth 2 times daily    Essential hypertension, Chronic kidney disease, stage 4 (severe) (H)       insulin glargine 100 UNIT/ML injection    LANTUS    15 mL    Inject 8 Units Subcutaneous every morning    Controlled type 2 diabetes mellitus with stage 4 chronic kidney disease, with long-term current use of insulin (H)       * insulin pen needle 31G X 6 MM miscellaneous    31G X 6 MM    120 each    Use as directed    Type 2 diabetes mellitus without complication, with long-term current use of insulin (H)       * TECHLITE PEN NEEDLES 32G X 4 MM miscellaneous   Generic drug:  insulin pen needle           levothyroxine 200 MCG tablet    SYNTHROID/LEVOTHROID    90 tablet    Take 1 tablet (200 mcg) by mouth daily    Other specified hypothyroidism       metoprolol succinate 25 MG 24 hr tablet    TOPROL-XL    90 tablet    Take 1 tablet (25 mg) by mouth daily    Hypertension associated with diabetes (H)       mometasone-formoterol 100-5 MCG/ACT oral inhaler    DULERA    1 Inhaler    Inhale 2 puffs into the lungs 2 times daily    COPD (chronic obstructive pulmonary disease) (H)       nicotine polacrilex 2 MG lozenge    COMMIT    100 lozenge    Dissolve 1 lozenge orally every 4 hours as directed.    Chronic obstructive pulmonary disease,  unspecified COPD type (H)       nitroGLYcerin 0.4 MG sublingual tablet    NITROSTAT    25 tablet    Place 1 tablet (0.4 mg) under the tongue every 5 minutes as needed for chest pain    History of MI (myocardial infarction)       nystatin 759082 UNIT/GM Powd    MYCOSTATIN    30 g    Apply 1 g topically 3 times daily as needed    Groin rash       * order for DME     1 Device    One wheeled walker with seat and brakes and basket    Risk for falls       * order for DME     2 each    Equipment being ordered: Compression socks. Strength:15-20 mmHg    Localized edema       order for DME     1 each    Equipment being ordered: Diabetic Shoes    Type 2 diabetes mellitus with stage 5 chronic kidney disease not on chronic dialysis, without long-term current use of insulin (H)       order for DME     2 Device    Equipment being ordered: two pairs moderate knee high support hose    Edema, unspecified type       oxyCODONE IR 10 MG tablet    ROXICODONE    90 tablet    Take 1 tablet (10 mg) by mouth every 8 hours as needed for moderate to severe pain    Chronic low back pain without sciatica, unspecified back pain laterality       polyethylene glycol powder    MIRALAX    510 g    Take 17 g (1 capful) by mouth daily as needed for constipation    Slow transit constipation       SM ALCOHOL PREP 70 % Pads     100 each    Externally apply 1 pad topically 4 times daily    Type 2 diabetes mellitus without complication, with long-term current use of insulin (H)       * sodium bicarbonate 325 MG tablet     360 tablet    Take 2 tablets (650 mg) by mouth 2 times daily    Acidosis, CKD (chronic kidney disease) stage 4, GFR 15-29 ml/min (H)       * sodium bicarbonate 650 MG tablet           tiotropium 18 MCG capsule    SPIRIVA HANDIHALER    90 capsule    Inhale contents of one capsule daily.    Pulmonary emphysema, unspecified emphysema type (H)       vitamin D3 2000 units tablet    CHOLECALCIFEROL    90 tablet    Take 2,000 Units by mouth daily     Vitamin D deficiency disease       * Notice:  This list has 8 medication(s) that are the same as other medications prescribed for you. Read the directions carefully, and ask your doctor or other care provider to review them with you.

## 2018-11-13 DIAGNOSIS — N18.4 CKD (CHRONIC KIDNEY DISEASE) STAGE 4, GFR 15-29 ML/MIN (H): Primary | ICD-10-CM

## 2018-11-15 ENCOUNTER — PATIENT OUTREACH (OUTPATIENT)
Dept: GERIATRIC MEDICINE | Facility: CLINIC | Age: 73
End: 2018-11-15

## 2018-11-15 NOTE — PROGRESS NOTES
[facility] Care Coordination Contact    Arranged transportation thru [facility] for the below appt:  Appt Date & Time: 11/16/18 at 8:45AM  Clinic Name & Address:  [facility]  [address] [place] [address]   Transportation Provider: Elissa Hands   time:  7:45am-8:15AM    Notified member of  time.    Alisa Griffith  Care Management Specialist  [facility]  [phone]

## 2018-11-15 NOTE — PROGRESS NOTES
[facility] Care Coordination Contact    Arranged transportation thru [facility] for the below appt:  Appt Date & Time: 11/23/18 at 8:30AM  Clinic Name & Address:  [facility]  [address] [place] [address], [address] [place]   Transportation Provider: Elissa Hands   time:  7:30-8:00AM    Notified member of  time.    Alisa Griffith  Care Management Specialist  [facility]  [phone]

## 2018-11-16 ENCOUNTER — OFFICE VISIT (OUTPATIENT)
Dept: NEPHROLOGY | Facility: CLINIC | Age: 73
End: 2018-11-16
Attending: INTERNAL MEDICINE
Payer: COMMERCIAL

## 2018-11-16 ENCOUNTER — CARE COORDINATION (OUTPATIENT)
Dept: NEPHROLOGY | Facility: CLINIC | Age: 73
End: 2018-11-16

## 2018-11-16 VITALS — BODY MASS INDEX: 27.96 KG/M2 | HEIGHT: 65 IN | WEIGHT: 167.8 LBS

## 2018-11-16 DIAGNOSIS — N18.4 CKD (CHRONIC KIDNEY DISEASE) STAGE 4, GFR 15-29 ML/MIN (H): Primary | ICD-10-CM

## 2018-11-16 DIAGNOSIS — E11.22 TYPE 2 DIABETES MELLITUS WITH DIABETIC CHRONIC KIDNEY DISEASE, UNSPECIFIED CKD STAGE, UNSPECIFIED WHETHER LONG TERM INSULIN USE (H): ICD-10-CM

## 2018-11-16 DIAGNOSIS — I15.2 HYPERTENSION ASSOCIATED WITH DIABETES (H): ICD-10-CM

## 2018-11-16 DIAGNOSIS — N18.4 CKD (CHRONIC KIDNEY DISEASE) STAGE 4, GFR 15-29 ML/MIN (H): ICD-10-CM

## 2018-11-16 DIAGNOSIS — E11.59 HYPERTENSION ASSOCIATED WITH DIABETES (H): ICD-10-CM

## 2018-11-16 LAB
ALBUMIN SERPL-MCNC: 1.5 G/DL (ref 3.4–5)
ANION GAP SERPL CALCULATED.3IONS-SCNC: 9 MMOL/L (ref 3–14)
BUN SERPL-MCNC: 42 MG/DL (ref 7–30)
CALCIUM SERPL-MCNC: 7.1 MG/DL (ref 8.5–10.1)
CHLORIDE SERPL-SCNC: 116 MMOL/L (ref 94–109)
CO2 SERPL-SCNC: 17 MMOL/L (ref 20–32)
CREAT SERPL-MCNC: 5.43 MG/DL (ref 0.52–1.04)
CREAT UR-MCNC: 110 MG/DL
ERYTHROCYTE [DISTWIDTH] IN BLOOD BY AUTOMATED COUNT: 15.5 % (ref 10–15)
GFR SERPL CREATININE-BSD FRML MDRD: 8 ML/MIN/1.7M2
GLUCOSE SERPL-MCNC: 85 MG/DL (ref 70–99)
HCT VFR BLD AUTO: 32.5 % (ref 35–47)
HGB BLD-MCNC: 9.9 G/DL (ref 11.7–15.7)
MCH RBC QN AUTO: 28.8 PG (ref 26.5–33)
MCHC RBC AUTO-ENTMCNC: 30.5 G/DL (ref 31.5–36.5)
MCV RBC AUTO: 95 FL (ref 78–100)
PHOSPHATE SERPL-MCNC: 5.2 MG/DL (ref 2.5–4.5)
PLATELET # BLD AUTO: 259 10E9/L (ref 150–450)
POTASSIUM SERPL-SCNC: 5.5 MMOL/L (ref 3.4–5.3)
PROT UR-MCNC: 11.5 G/L
PROT/CREAT 24H UR: 10.45 G/G CR (ref 0–0.2)
RBC # BLD AUTO: 3.44 10E12/L (ref 3.8–5.2)
SODIUM SERPL-SCNC: 142 MMOL/L (ref 133–144)
WBC # BLD AUTO: 5.8 10E9/L (ref 4–11)

## 2018-11-16 PROCEDURE — 36415 COLL VENOUS BLD VENIPUNCTURE: CPT | Performed by: INTERNAL MEDICINE

## 2018-11-16 PROCEDURE — 80069 RENAL FUNCTION PANEL: CPT | Performed by: INTERNAL MEDICINE

## 2018-11-16 PROCEDURE — 84156 ASSAY OF PROTEIN URINE: CPT | Performed by: INTERNAL MEDICINE

## 2018-11-16 PROCEDURE — 85027 COMPLETE CBC AUTOMATED: CPT | Performed by: INTERNAL MEDICINE

## 2018-11-16 PROCEDURE — G0463 HOSPITAL OUTPT CLINIC VISIT: HCPCS | Mod: ZF

## 2018-11-16 ASSESSMENT — PAIN SCALES - GENERAL: PAINLEVEL: SEVERE PAIN (6)

## 2018-11-16 NOTE — MR AVS SNAPSHOT
After Visit Summary   11/16/2018    Kim Anne    MRN: [id number]           Patient Information     Date Of Birth          1945        Visit Information        Provider Department      11/16/2018 9:40 AM Bhargav Lopes MD [facility] Nephrology        Today's Diagnoses     CKD (chronic kidney disease) stage 4, GFR 15-29 ml/min (H)    -  1    Hypertension associated with diabetes (H)        Type 2 diabetes mellitus with diabetic chronic kidney disease, unspecified CKD stage, unspecified whether long term insulin use (H)           Follow-ups after your visit        Follow-up notes from your care team     Return in about 2 months (around 1/16/2019).      Your next 10 appointments already scheduled     Nov 23, 2018  8:30 AM CST   Return Visit with JUNIOR Bishop CNP   [facility] ([facility])    [address]  [address]  [facility] [address]   [phone]            Nov 23, 2018  8:30 AM CST   Return Visit with LAMINE Chahal   [facility] ([facility])    [address]  [address]  [facility] [address]   [phone]            Dec 10, 2018 10:00 AM CST   Nurse Visit with  Dsc Nurse   [facility] and Infectious Diseases ([facility])    [address]  [address]  [facility] [address] [phone]            Jan 30, 2019 10:00 AM CST   Lab with  LAB   [facility] ([facility])    [address] [place] [address] [address]  1st Floor  [facility] [address] [phone]            Jan 30, 2019 10:50 AM CST   (Arrive by 10:20 AM)   Return Visit with Rasheeda Liz MD   [facility] Nephrology ([facility])    [address]  [address]  [facility] [id number] [phone]              Who to contact     If you have questions or need follow up  "information about today's clinic visit or your schedule please contact [facility] NE[facility]OLOG[facility] directly at [phone].  Normal or non-critical lab and imaging results will be communicated to you by MyChart, letter or phone within 4 business days after the clinic has received the results. If you do not hear from us within 7 days, please contact the clinic through MyChart or phone. If you have a critical or abnormal lab result, we will notify you by phone as soon as possible.  Submit refill requests through [employer] or call your pharmacy and they will forward the refill request to us. Please allow 3 business days for your refill to be completed.          Additional Information About Your Visit        Care EveryWhere ID     This is your Care EveryWhere ID. This could be used by other organizations to access your [place] medical records  [id number]        Your Vitals Were     Height BMI (Body Mass Index)                1.651 m (5' 5\") 27.92 kg/m2           Blood Pressure from Last 3 Encounters:   10/19/18 142/64   10/05/18 139/70   08/30/18 120/64    Weight from Last 3 Encounters:   11/16/18 76.1 kg (167 lb 12.8 oz)   10/19/18 74.4 kg (164 lb)   10/05/18 74.6 kg (164 lb 6.4 oz)              Today, you had the following     No orders found for display       Primary Care Provider Office Phone # Fax #    Ailyn Nieves APRCRISTELA [phone]       [address] [facility] [address] [place] [address]        Equal Access to Services     CECIL TOLLIVER : Hadii aad ku hadasho Soomaali, waaxda luqadaha, qaybta kaalmada adeegyada, kassandra white . So [facility] [phone].    ATENCIÓN: Si habla español, tiene a bailey disposición servicios gratuitos de asistencia lingüística. Llame al [phone].    We comply with applicable federal civil rights laws and [place] laws. We do not discriminate on the basis of race, color, national origin, age, disability, sex, sexual orientation, or gender identity.       "      Thank you!     Thank you for choosing [facility] NEPHROLOGY  for your care. Our goal is always to provide you with excellent care. Hearing back from our patients is one way we can continue to improve our services. Please take a few minutes to complete the written survey that you may receive in the mail after your visit with us. Thank you!             Your Updated Medication List - Protect others around you: Learn how to safely use, store and throw away your medicines at www.disposemymeds.org.          This list is accurate as of 11/16/18 10:29 AM.  Always use your most recent med list.                   Brand Name Dispense Instructions for use Diagnosis    albuterol 108 (90 Base) MCG/ACT inhaler    PROAIR HFA/PROVENTIL HFA/VENTOLIN HFA    18 g    Inhale 2 puffs into the lungs every 6 hours as needed for shortness of breath / dyspnea or wheezing    Chronic obstructive pulmonary disease, unspecified COPD type (H)       amLODIPine 10 MG tablet    NORVASC    90 tablet    Take 1 tablet (10 mg) by mouth daily    Renovascular hypertension       ASPIR-LOW 81 MG EC tablet   Generic drug:  aspirin     120 tablet    TAKE 1 TABLET BY MOUTH EVERY DAY    History of MI (myocardial infarction), Essential hypertension, benign       atorvastatin 40 MG tablet    LIPITOR    90 tablet    Take 1 tablet (40 mg) by mouth daily    Hyperlipidemia LDL goal <100       blood glucose monitoring lancets     100 each    Test BS two times daily as directed    Type 2 diabetes mellitus without complication, with long-term current use of insulin (H)       blood glucose monitoring meter device kit    no brand specified    1 kit    Use to test blood sugar 2 times daily or as directed. Preferred blood glucose meter OR supplies to accompany: Blood Glucose Monitor Brands: per insurance.    Type 2 diabetes mellitus without complication, with long-term current use of insulin (H)       blood glucose monitoring test strip    FREESTYLE LITE    50 strip     TEST BLOOD SUGAR TWO TIMES A DAY    Type 2 diabetes mellitus without complication, with long-term current use of insulin (H)       Blood Pressure Monitor Kit     1 kit    Automatic Blood Pressure Monitor    Renovascular hypertension       docusate sodium 100 MG capsule    COLACE    60 capsule    Take 1 capsule (100 mg) by mouth 2 times daily    Constipation, unspecified constipation type       ergocalciferol 22514 units capsule    ERGOCALCIFEROL    8 capsule    Take 1 capsule (50,000 Units) by mouth once a week    Vitamin D deficiency disease       EUCERIN CALMING DAILY MOIST Crea     1 Tube    Externally apply 1 dose. topically daily    Type II or unspecified type diabetes mellitus with neurological manifestations, not stated as uncontrolled(250.60) (H)       ferrous sulfate 325 (65 Fe) MG tablet    IRON          fluticasone 50 MCG/ACT spray    FLONASE    1 Bottle    Spray 1-2 sprays into both nostrils daily    Acute nasopharyngitis       furosemide 40 MG tablet    LASIX    90 tablet    Take 1.5 tablets (60 mg) by mouth daily    Hyperkalemia, Edema, unspecified type       * hydrALAZINE 50 MG tablet    APRESOLINE          * hydrALAZINE 25 MG tablet    APRESOLINE     Take 1 tablet (25 mg) by mouth 2 times daily    Essential hypertension, Chronic kidney disease, stage 4 (severe) (H)       insulin glargine 100 UNIT/ML injection    LANTUS    15 mL    Inject 8 Units Subcutaneous every morning    Controlled type 2 diabetes mellitus with stage 4 chronic kidney disease, with long-term current use of insulin (H)       * insulin pen needle 31G X 6 MM     120 each    Use as directed    Type 2 diabetes mellitus without complication, with long-term current use of insulin (H)       * TECHLITE PEN NEEDLES 32G X 4 MM   Generic drug:  insulin pen needle           levothyroxine 200 MCG tablet    SYNTHROID/LEVOTHROID    90 tablet    Take 1 tablet (200 mcg) by mouth daily    Other specified hypothyroidism       metoprolol succinate 25  MG 24 hr tablet    TOPROL-XL    90 tablet    Take 1 tablet (25 mg) by mouth daily    Hypertension associated with diabetes (H)       mometasone-formoterol 100-5 MCG/ACT oral inhaler    DULERA    1 Inhaler    Inhale 2 puffs into the lungs 2 times daily    COPD (chronic obstructive pulmonary disease) (H)       nicotine polacrilex 2 MG lozenge    COMMIT    100 lozenge    Dissolve 1 lozenge orally every 4 hours as directed.    Chronic obstructive pulmonary disease, unspecified COPD type (H)       nitroGLYcerin 0.4 MG sublingual tablet    NITROSTAT    25 tablet    Place 1 tablet (0.4 mg) under the tongue every 5 minutes as needed for chest pain    History of MI (myocardial infarction)       nystatin 364845 UNIT/GM Powd    MYCOSTATIN    30 g    Apply 1 g topically 3 times daily as needed    Groin rash       * order for DME     1 Device    One wheeled walker with seat and brakes and basket    Risk for falls       * order for DME     2 each    Equipment being ordered: Compression socks. Strength:15-20 mmHg    Localized edema       order for DME     1 each    Equipment being ordered: Diabetic Shoes    Type 2 diabetes mellitus with stage 5 chronic kidney disease not on chronic dialysis, without long-term current use of insulin (H)       order for DME     2 Device    Equipment being ordered: two pairs moderate knee high support hose    Edema, unspecified type       oxyCODONE IR 10 MG tablet    ROXICODONE    90 tablet    Take 1 tablet (10 mg) by mouth every 8 hours as needed for moderate to severe pain    Chronic low back pain without sciatica, unspecified back pain laterality       polyethylene glycol powder    MIRALAX    510 g    Take 17 g (1 capful) by mouth daily as needed for constipation    Slow transit constipation       SM ALCOHOL PREP 70 % Pads     100 each    Externally apply 1 pad topically 4 times daily    Type 2 diabetes mellitus without complication, with long-term current use of insulin (H)       * sodium  bicarbonate 325 MG tablet     360 tablet    Take 2 tablets (650 mg) by mouth 2 times daily    Acidosis, CKD (chronic kidney disease) stage 4, GFR 15-29 ml/min (H)       * sodium bicarbonate 650 MG tablet           tiotropium 18 MCG capsule    SPIRIVA HANDIHALER    90 capsule    Inhale contents of one capsule daily.    Pulmonary emphysema, unspecified emphysema type (H)       vitamin D 2000 units tablet     90 tablet    Take 2,000 Units by mouth daily    Vitamin D deficiency disease       * Notice:  This list has 8 medication(s) that are the same as other medications prescribed for you. Read the directions carefully, and ask your doctor or other care provider to review them with you.

## 2018-11-16 NOTE — PROGRESS NOTES
Patient seen by Dr. Lopes today.  Ready to proceed with AV graft surgery. Patient saw Dr. Kramer for access consult in January 2018. Message sent to surgical team to see if surgery can be scheduled or if they need to re-consult since Dr. Kramer is no longer here.    Discussed with patient during appointment today and told her to expect a call about scheduling.     Ayush Oneal, NICOLE   Nephrology RN Care Coordinator
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
## 2018-11-23 ENCOUNTER — OFFICE VISIT (OUTPATIENT)
Dept: FAMILY MEDICINE | Facility: CLINIC | Age: 73
End: 2018-11-23
Payer: COMMERCIAL

## 2018-11-23 ENCOUNTER — OFFICE VISIT (OUTPATIENT)
Dept: BEHAVIORAL HEALTH | Facility: CLINIC | Age: 73
End: 2018-11-23
Payer: COMMERCIAL

## 2018-11-23 VITALS
SYSTOLIC BLOOD PRESSURE: 120 MMHG | DIASTOLIC BLOOD PRESSURE: 60 MMHG | HEIGHT: 65 IN | RESPIRATION RATE: 14 BRPM | OXYGEN SATURATION: 96 % | WEIGHT: 166.5 LBS | BODY MASS INDEX: 27.74 KG/M2 | HEART RATE: 85 BPM | TEMPERATURE: 98.5 F

## 2018-11-23 DIAGNOSIS — J42 CHRONIC BRONCHITIS, UNSPECIFIED CHRONIC BRONCHITIS TYPE (H): Primary | ICD-10-CM

## 2018-11-23 DIAGNOSIS — Z79.4 TYPE 2 DIABETES MELLITUS WITH CHRONIC KIDNEY DISEASE, WITH LONG-TERM CURRENT USE OF INSULIN, UNSPECIFIED CKD STAGE (H): ICD-10-CM

## 2018-11-23 DIAGNOSIS — M54.50 CHRONIC LOW BACK PAIN WITHOUT SCIATICA, UNSPECIFIED BACK PAIN LATERALITY: ICD-10-CM

## 2018-11-23 DIAGNOSIS — N18.4 CKD (CHRONIC KIDNEY DISEASE) STAGE 4, GFR 15-29 ML/MIN (H): ICD-10-CM

## 2018-11-23 DIAGNOSIS — E11.22 TYPE 2 DIABETES MELLITUS WITH CHRONIC KIDNEY DISEASE, WITH LONG-TERM CURRENT USE OF INSULIN, UNSPECIFIED CKD STAGE (H): ICD-10-CM

## 2018-11-23 DIAGNOSIS — E78.5 HYPERLIPIDEMIA LDL GOAL <100: ICD-10-CM

## 2018-11-23 DIAGNOSIS — J44.9 CHRONIC OBSTRUCTIVE PULMONARY DISEASE, UNSPECIFIED COPD TYPE (H): ICD-10-CM

## 2018-11-23 DIAGNOSIS — J43.9 PULMONARY EMPHYSEMA, UNSPECIFIED EMPHYSEMA TYPE (H): ICD-10-CM

## 2018-11-23 DIAGNOSIS — G89.29 CHRONIC LOW BACK PAIN WITHOUT SCIATICA, UNSPECIFIED BACK PAIN LATERALITY: ICD-10-CM

## 2018-11-23 DIAGNOSIS — G89.29 OTHER CHRONIC PAIN: ICD-10-CM

## 2018-11-23 DIAGNOSIS — F17.210 CIGARETTE NICOTINE DEPENDENCE WITHOUT COMPLICATION: ICD-10-CM

## 2018-11-23 DIAGNOSIS — I25.2 HISTORY OF MI (MYOCARDIAL INFARCTION): ICD-10-CM

## 2018-11-23 DIAGNOSIS — F41.1 GAD (GENERALIZED ANXIETY DISORDER): Primary | ICD-10-CM

## 2018-11-23 DIAGNOSIS — I10 HYPERTENSION GOAL BP (BLOOD PRESSURE) < 130/80: ICD-10-CM

## 2018-11-23 DIAGNOSIS — F43.21 GRIEF: ICD-10-CM

## 2018-11-23 DIAGNOSIS — I15.0 RENOVASCULAR HYPERTENSION: ICD-10-CM

## 2018-11-23 DIAGNOSIS — F41.1 GAD (GENERALIZED ANXIETY DISORDER): ICD-10-CM

## 2018-11-23 PROCEDURE — 99214 OFFICE O/P EST MOD 30 MIN: CPT | Performed by: NURSE PRACTITIONER

## 2018-11-23 PROCEDURE — 90832 PSYTX W PT 30 MINUTES: CPT | Performed by: SOCIAL WORKER

## 2018-11-23 RX ORDER — TIOTROPIUM BROMIDE 18 UG/1
CAPSULE ORAL; RESPIRATORY (INHALATION)
Qty: 90 CAPSULE | Refills: 3 | Status: SHIPPED | OUTPATIENT
Start: 2018-11-23 | End: 2019-01-04

## 2018-11-23 RX ORDER — OXYCODONE HYDROCHLORIDE 10 MG/1
10 TABLET ORAL EVERY 8 HOURS PRN
Qty: 90 TABLET | Refills: 0 | Status: SHIPPED | OUTPATIENT
Start: 2018-11-23 | End: 2018-12-27

## 2018-11-23 RX ORDER — ALBUTEROL SULFATE 90 UG/1
2 AEROSOL, METERED RESPIRATORY (INHALATION) EVERY 6 HOURS PRN
Qty: 18 G | Refills: 3 | Status: SHIPPED | OUTPATIENT
Start: 2018-11-23 | End: 2019-01-04

## 2018-11-23 RX ORDER — AMLODIPINE BESYLATE 10 MG/1
10 TABLET ORAL DAILY
Qty: 90 TABLET | Refills: 3 | Status: ON HOLD | OUTPATIENT
Start: 2018-11-23 | End: 2019-05-30

## 2018-11-23 ASSESSMENT — PAIN SCALES - GENERAL: PAINLEVEL: MODERATE PAIN (4)

## 2018-11-23 NOTE — MR AVS SNAPSHOT
After Visit Summary   11/23/2018    Kim Anne    MRN: [id number]           Patient Information     Date Of Birth          1945        Visit Information        Provider Department      11/23/2018 9:30 AM Mireya Baldwin APRN CNP [facility]        Today's Diagnoses     Chronic bronchitis, unspecified chronic bronchitis type (H)        Pulmonary emphysema, unspecified emphysema type (H)        Chronic obstructive pulmonary disease, unspecified COPD type (H)        Renovascular hypertension        Chronic low back pain without sciatica, unspecified back pain laterality          Care Instructions    Medications refilled today.  Keep checking blood sugars a few times a week.  Schedule with me next month for a check up.          Follow-ups after your visit        Follow-up notes from your care team     Return in about 4 weeks (around 12/21/2018) for PharmD collaboration, Routine Visit, Physical Exam.      Your next 10 appointments already scheduled     Dec 10, 2018 10:00 AM CST   Nurse Visit with  Dsc Nurse   [facility] Infectious Diseases ([facility])    [address] [place] [address]  [address]  [facility] [address] [phone]            Jan 30, 2019 10:00 AM CST   Lab with  LAB   [facility] ([facility])    [address] [place] [address]  1st Floor  [facility] [id number] [phone]            Jan 30, 2019 10:50 AM CST   (Arrive by 10:20 AM)   Return Visit with Rasheeda Liz MD   [facility] Nephrology ([facility])    [address] [place] [address]  [address]  [facility] [id number] [phone]              Who to contact     If you have questions or need follow up information about today's clinic visit or your schedule please contact [facility] directly at [phone].  Normal or non-critical lab and imaging results will be  "communicated to you by MyChart, letter or phone within 4 business days after the clinic has received the results. If you do not hear from us within 7 days, please contact the clinic through MyChart or phone. If you have a critical or abnormal lab result, we will notify you by phone as soon as possible.  Submit refill requests through [employer] or call your pharmacy and they will forward the refill request to us. Please allow 3 business days for your refill to be completed.          Additional Information About Your Visit        Care EveryWhere ID     This is your Care EveryWhere ID. This could be used by other organizations to access your [facility] medical records  [id number]        Your Vitals Were     Pulse Temperature Respirations Height Pulse Oximetry BMI (Body Mass Index)    85 98.5  F (36.9  C) (Oral) 14 5' 5\" (1.651 m) 96% 27.71 kg/m2       Blood Pressure from Last 3 Encounters:   11/23/18 120/60   10/19/18 142/64   10/05/18 139/70    Weight from Last 3 Encounters:   11/23/18 166 lb 8 oz (75.5 kg)   11/16/18 167 lb 12.8 oz (76.1 kg)   10/19/18 164 lb (74.4 kg)              Today, you had the following     No orders found for display         Where to get your medicines      These medications were sent to [facility] [place], [place] - [address] [facility]  [address] [facility] [address]     Phone:  [phone]     albuterol 108 (90 Base) MCG/ACT inhaler    amLODIPine 10 MG tablet    mometasone-formoterol 100-5 MCG/ACT oral inhaler    tiotropium 18 MCG capsule         Some of these will need a paper prescription and others can be bought over the counter.  Ask your nurse if you have questions.     Bring a paper prescription for each of these medications     oxyCODONE IR 10 MG tablet         Information about OPIOIDS     PRESCRIPTION OPIOIDS: WHAT YOU NEED TO KNOW   We gave you an opioid (narcotic) pain medicine. It is important to manage your pain, but opioids are not always the best " choice. You should first try all the other options your care team gave you. Take this medicine for as short a time (and as few doses) as possible.    Some activities can increase your pain, such as bandage changes or therapy sessions. It may help to take your pain medicine 30 to 60 minutes before these activities. Reduce your stress by getting enough sleep, working on hobbies you enjoy and practicing relaxation or meditation. Talk to your care team about ways to manage your pain beyond prescription opioids.    These medicines have risks:    DO NOT drive when on new or higher doses of pain medicine. These medicines can affect your alertness and reaction times, and you could be arrested for driving under the influence (DUI). If you need to use opioids long-term, talk to your care team about driving.    DO NOT operate heavy machinery    DO NOT do any other dangerous activities while taking these medicines.    DO NOT drink any alcohol while taking these medicines.     If the opioid prescribed includes acetaminophen, DO NOT take with any other medicines that contain acetaminophen. Read all labels carefully. Look for the word  acetaminophen  or  Tylenol.  Ask your pharmacist if you have questions or are unsure.    You can get addicted to pain medicines, especially if you have a history of addiction (chemical, alcohol or substance dependence). Talk to your care team about ways to reduce this risk.    All opioids tend to cause constipation. Drink plenty of water and eat foods that have a lot of fiber, such as fruits, vegetables, prune juice, apple juice and high-fiber cereal. Take a laxative (Miralax, milk of magnesia, Colace, Senna) if you don t move your bowels at least every other day. Other side effects include upset stomach, sleepiness, dizziness, throwing up, tolerance (needing more of the medicine to have the same effect), physical dependence and slowed breathing.    Store your pills in a secure place, locked if  possible. We will not replace any lost or stolen medicine. If you don t finish your medicine, please throw away (dispose) as directed by your pharmacist. The [place] [employer]ution [employer] has more information about safe disposal: [email or web address]         Primary Care Provider Office Phone # Fax #    JUNIOR Bishop [phone]       [address] [facility] [address]  [facility] [address]        Equal Access to Services     CECIL TOLLIVER : Hadii aad ku hadasho Soomaali, waaxda luqadaha, qaybta kaalmada adeegyada, waxay idiin hayaan adeeg kharaanna white . So [facility] [phone].    ATENCIÓN: Si habla español, tiene a bailey disposición servicios gratuitos de asistencia lingüística. Khadijah al [phone].    We comply with applicable federal civil rights laws and [place] laws. We do not discriminate on the basis of race, color, national origin, age, disability, sex, sexual orientation, or gender identity.            Thank you!     Thank you for choosing [facility]  for your care. Our goal is always to provide you with excellent care. Hearing back from our patients is one way we can continue to improve our services. Please take a few minutes to complete the written survey that you may receive in the mail after your visit with us. Thank you!             Your Updated Medication List - Protect others around you: Learn how to safely use, store and throw away your medicines at www.disposemymeds.org.          This list is accurate as of 11/23/18  9:37 AM.  Always use your most recent med list.                   Brand Name Dispense Instructions for use Diagnosis    albuterol 108 (90 Base) MCG/ACT inhaler    PROAIR HFA/PROVENTIL HFA/VENTOLIN HFA    18 g    Inhale 2 puffs into the lungs every 6 hours as needed for shortness of breath / dyspnea or wheezing    Chronic obstructive pulmonary disease, unspecified COPD type (H)        amLODIPine 10 MG tablet    NORVASC    90 tablet    Take 1 tablet (10 mg) by mouth daily    Renovascular hypertension       ASPIR-LOW 81 MG EC tablet   Generic drug:  aspirin     120 tablet    TAKE 1 TABLET BY MOUTH EVERY DAY    History of MI (myocardial infarction), Essential hypertension, benign       atorvastatin 40 MG tablet    LIPITOR    90 tablet    Take 1 tablet (40 mg) by mouth daily    Hyperlipidemia LDL goal <100       blood glucose monitoring lancets     100 each    Test BS two times daily as directed    Type 2 diabetes mellitus without complication, with long-term current use of insulin (H)       blood glucose monitoring meter device kit    no brand specified    1 kit    Use to test blood sugar 2 times daily or as directed. Preferred blood glucose meter OR supplies to accompany: Blood Glucose Monitor Brands: per insurance.    Type 2 diabetes mellitus without complication, with long-term current use of insulin (H)       blood glucose monitoring test strip    FREESTYLE LITE    50 strip    TEST BLOOD SUGAR TWO TIMES A DAY    Type 2 diabetes mellitus without complication, with long-term current use of insulin (H)       Blood Pressure Monitor Kit     1 kit    Automatic Blood Pressure Monitor    Renovascular hypertension       docusate sodium 100 MG capsule    COLACE    60 capsule    Take 1 capsule (100 mg) by mouth 2 times daily    Constipation, unspecified constipation type       ergocalciferol 76879 units capsule    ERGOCALCIFEROL    8 capsule    Take 1 capsule (50,000 Units) by mouth once a week    Vitamin D deficiency disease       EUCERIN CALMING DAILY MOIST Crea     1 Tube    Externally apply 1 dose. topically daily    Type II or unspecified type diabetes mellitus with neurological manifestations, not stated as uncontrolled(250.60) (H)       ferrous sulfate 325 (65 Fe) MG tablet    IRON          fluticasone 50 MCG/ACT spray    FLONASE    1 Bottle    Spray 1-2 sprays into both nostrils daily    Acute  nasopharyngitis       furosemide 40 MG tablet    LASIX    90 tablet    Take 1.5 tablets (60 mg) by mouth daily    Hyperkalemia, Edema, unspecified type       * hydrALAZINE 50 MG tablet    APRESOLINE          * hydrALAZINE 25 MG tablet    APRESOLINE     Take 1 tablet (25 mg) by mouth 2 times daily    Essential hypertension, Chronic kidney disease, stage 4 (severe) (H)       insulin glargine 100 UNIT/ML injection    LANTUS    15 mL    Inject 8 Units Subcutaneous every morning    Controlled type 2 diabetes mellitus with stage 4 chronic kidney disease, with long-term current use of insulin (H)       * insulin pen needle 31G X 6 MM miscellaneous    31G X 6 MM    120 each    Use as directed    Type 2 diabetes mellitus without complication, with long-term current use of insulin (H)       * TECHLITE PEN NEEDLES 32G X 4 MM miscellaneous   Generic drug:  insulin pen needle           levothyroxine 200 MCG tablet    SYNTHROID/LEVOTHROID    90 tablet    Take 1 tablet (200 mcg) by mouth daily    Other specified hypothyroidism       metoprolol succinate 25 MG 24 hr tablet    TOPROL-XL    90 tablet    Take 1 tablet (25 mg) by mouth daily    Hypertension associated with diabetes (H)       mometasone-formoterol 100-5 MCG/ACT oral inhaler    DULERA    1 Inhaler    Inhale 2 puffs into the lungs 2 times daily    Chronic bronchitis, unspecified chronic bronchitis type (H)       nicotine polacrilex 2 MG lozenge    COMMIT    100 lozenge    Dissolve 1 lozenge orally every 4 hours as directed.    Chronic obstructive pulmonary disease, unspecified COPD type (H)       nitroGLYcerin 0.4 MG sublingual tablet    NITROSTAT    25 tablet    Place 1 tablet (0.4 mg) under the tongue every 5 minutes as needed for chest pain    History of MI (myocardial infarction)       nystatin 750551 UNIT/GM Powd    MYCOSTATIN    30 g    Apply 1 g topically 3 times daily as needed    Groin rash       * order for DME     1 Device    One wheeled walker with seat and  brakes and basket    Risk for falls       * order for DME     2 each    Equipment being ordered: Compression socks. Strength:15-20 mmHg    Localized edema       order for DME     1 each    Equipment being ordered: Diabetic Shoes    Type 2 diabetes mellitus with stage 5 chronic kidney disease not on chronic dialysis, without long-term current use of insulin (H)       order for DME     2 Device    Equipment being ordered: two pairs moderate knee high support hose    Edema, unspecified type       oxyCODONE IR 10 MG tablet    ROXICODONE    90 tablet    Take 1 tablet (10 mg) by mouth every 8 hours as needed for moderate to severe pain    Chronic low back pain without sciatica, unspecified back pain laterality       polyethylene glycol powder    MIRALAX    510 g    Take 17 g (1 capful) by mouth daily as needed for constipation    Slow transit constipation       SM ALCOHOL PREP 70 % Pads     100 each    Externally apply 1 pad topically 4 times daily    Type 2 diabetes mellitus without complication, with long-term current use of insulin (H)       * sodium bicarbonate 325 MG tablet     360 tablet    Take 2 tablets (650 mg) by mouth 2 times daily    Acidosis, CKD (chronic kidney disease) stage 4, GFR 15-29 ml/min (H)       * sodium bicarbonate 650 MG tablet           tiotropium 18 MCG capsule    SPIRIVA HANDIHALER    90 capsule    Inhale contents of one capsule daily.    Pulmonary emphysema, unspecified emphysema type (H)       vitamin D3 2000 units tablet    CHOLECALCIFEROL    90 tablet    Take 2,000 Units by mouth daily    Vitamin D deficiency disease       * Notice:  This list has 8 medication(s) that are the same as other medications prescribed for you. Read the directions carefully, and ask your doctor or other care provider to review them with you.

## 2018-11-23 NOTE — MR AVS SNAPSHOT
After Visit Summary   11/23/2018    Kim Anne    MRN: [id number]           Patient Information     Date Of Birth          1945        Visit Information        Provider Department      11/23/2018 9:30 AM Ryan Christine, [place] [facility]        Today's Diagnoses     JUSTINE (generalized anxiety disorder)    -  1       Follow-ups after your visit        Your next 10 appointments already scheduled     Dec 10, 2018 10:00 AM CST   Nurse Visit with  Dsc Nurse   [facility]    [address]  [address]  [place] [address] [phone]            Jan 04, 2019  2:00 PM CST   [place] Visit with JUNIOR Guzman CNP   [facility] [place]    [address]  [address]  [place] [id number] [phone]            Jan 04, 2019  2:00 PM CST   [place] Visit with LAMINE Seals   [facility] [place]    [address]  [address]  [place] [id number] [phone]            Jan 30, 2019 10:00 AM CST   Lab with  LAB   [facility] ([facility]    [address]  1st Floor  [place] [id number] [phone]            Jan 30, 2019 10:50 AM CST   (Arrive by 10:20 AM)   Return Visit with Rasheeda Liz MD   [facility] ([facility]    [address]  [address]  [place] [id number] [phone]              Who to contact     If you have questions or need follow up information about today's clinic visit or your schedule please contact [facility] directly at [phone].  Normal or non-critical lab and imaging results will be communicated to you by MyChart, letter or phone within 4 business days after  the clinic has received the results. If you do not hear from us within 7 days, please contact the clinic through [employer] or phone. If you have a critical or abnormal lab result, we will notify you by phone as soon as possible.  Submit refill requests through [employer] or call your pharmacy and they will forward the refill request to us. Please allow 3 business days for your refill to be completed.          Additional Information About Your Visit        Care EveryWhere ID     This is your Care EveryWhere ID. This could be used by other organizations to access your [facility] medical records  [id number]         Blood Pressure from Last 3 Encounters:   11/23/18 120/60   10/19/18 142/64   10/05/18 139/70    Weight from Last 3 Encounters:   11/23/18 75.5 kg (166 lb 8 oz)   11/16/18 76.1 kg (167 lb 12.8 oz)   10/19/18 74.4 kg (164 lb)              Today, you had the following     No orders found for display         Where to get your medicines      These medications were sent to [facility] [place], [place] - [address]  [address] [facility] [address] [facility] [address]     Phone:  [phone]     albuterol 108 (90 Base) MCG/ACT inhaler    amLODIPine 10 MG tablet    mometasone-formoterol 100-5 MCG/ACT inhaler    tiotropium 18 MCG inhaled capsule         Some of these will need a paper prescription and others can be bought over the counter.  Ask your nurse if you have questions.     Bring a paper prescription for each of these medications     oxyCODONE IR 10 MG tablet          Primary Care Provider Office Phone # Fax #    JUNIOR Bishop [phone] [address] [facility] [address]  [facility] [address]        Equal Access to Services     DIMPLE TOLLIVER : Hadii alysha Olmstead, wasegun delgadillo, gail kaalmada stephanie, kassandra shoemaker. So [facility] [phone].    ATENCIÓN: Si habla español, tiene a bailey disposición servicios gratuitos de asistencia  lingüísticaChen Lucio al [phone].    We comply with applicable federal civil rights laws and [place] laws. We do not discriminate on the basis of race, color, national origin, age, disability, sex, sexual orientation, or gender identity.            Thank you!     Thank you for choosing [facility]  for your care. Our goal is always to provide you with excellent care. Hearing back from our patients is one way we can continue to improve our services. Please take a few minutes to complete the written survey that you may receive in the mail after your visit with us. Thank you!             Your Updated Medication List - Protect others around you: Learn how to safely use, store and throw away your medicines at www.disposemymeds.org.          This list is accurate as of 11/23/18 11:59 PM.  Always use your most recent med list.                   Brand Name Dispense Instructions for use Diagnosis    albuterol 108 (90 Base) MCG/ACT inhaler    PROAIR HFA/PROVENTIL HFA/VENTOLIN HFA    18 g    Inhale 2 puffs into the lungs every 6 hours as needed for shortness of breath / dyspnea or wheezing    Chronic obstructive pulmonary disease, unspecified COPD type (H)       amLODIPine 10 MG tablet    NORVASC    90 tablet    Take 1 tablet (10 mg) by mouth daily    Renovascular hypertension       ASPIR-LOW 81 MG EC tablet   Generic drug:  aspirin     120 tablet    TAKE 1 TABLET BY MOUTH EVERY DAY    History of MI (myocardial infarction), Essential hypertension, benign       atorvastatin 40 MG tablet    LIPITOR    90 tablet    Take 1 tablet (40 mg) by mouth daily    Hyperlipidemia LDL goal <100       blood glucose monitoring lancets     100 each    Test BS two times daily as directed    Type 2 diabetes mellitus without complication, with long-term current use of insulin (H)       blood glucose monitoring meter device kit    no brand specified    1 kit    Use to test blood sugar 2 times daily or as directed.  Preferred blood glucose meter OR supplies to accompany: Blood Glucose Monitor Brands: per insurance.    Type 2 diabetes mellitus without complication, with long-term current use of insulin (H)       blood glucose monitoring test strip    FREESTYLE LITE    50 strip    TEST BLOOD SUGAR TWO TIMES A DAY    Type 2 diabetes mellitus without complication, with long-term current use of insulin (H)       Blood Pressure Monitor Kit     1 kit    Automatic Blood Pressure Monitor    Renovascular hypertension       docusate sodium 100 MG capsule    COLACE    60 capsule    Take 1 capsule (100 mg) by mouth 2 times daily    Constipation, unspecified constipation type       ergocalciferol 00644 units capsule    ERGOCALCIFEROL    8 capsule    Take 1 capsule (50,000 Units) by mouth once a week    Vitamin D deficiency disease       EUCERIN CALMING DAILY MOIST Crea     1 Tube    Externally apply 1 dose. topically daily    Type II or unspecified type diabetes mellitus with neurological manifestations, not stated as uncontrolled(250.60) (H)       ferrous sulfate 325 (65 Fe) MG tablet    IRON          fluticasone 50 MCG/ACT nasal spray    FLONASE    1 Bottle    Spray 1-2 sprays into both nostrils daily    Acute nasopharyngitis       furosemide 40 MG tablet    LASIX    90 tablet    Take 1.5 tablets (60 mg) by mouth daily    Hyperkalemia, Edema, unspecified type       * hydrALAZINE 50 MG tablet    APRESOLINE          * hydrALAZINE 25 MG tablet    APRESOLINE     Take 1 tablet (25 mg) by mouth 2 times daily    Essential hypertension, Chronic kidney disease, stage 4 (severe) (H)       insulin glargine 100 UNIT/ML pen    LANTUS PEN    15 mL    Inject 8 Units Subcutaneous every morning    Controlled type 2 diabetes mellitus with stage 4 chronic kidney disease, with long-term current use of insulin (H)       * insulin pen needle 31G X 6 MM miscellaneous    31G X 6 MM    120 each    Use as directed    Type 2 diabetes mellitus without complication,  with long-term current use of insulin (H)       * TECHLITE PEN NEEDLES 32G X 4 MM miscellaneous   Generic drug:  insulin pen needle           levothyroxine 200 MCG tablet    SYNTHROID/LEVOTHROID    90 tablet    Take 1 tablet (200 mcg) by mouth daily    Other specified hypothyroidism       metoprolol succinate 25 MG 24 hr tablet    TOPROL-XL    90 tablet    Take 1 tablet (25 mg) by mouth daily    Hypertension associated with diabetes (H)       mometasone-formoterol 100-5 MCG/ACT inhaler    DULERA    1 Inhaler    Inhale 2 puffs into the lungs 2 times daily    Chronic bronchitis, unspecified chronic bronchitis type (H)       nicotine 2 MG lozenge    COMMIT    100 lozenge    Dissolve 1 lozenge orally every 4 hours as directed.    Chronic obstructive pulmonary disease, unspecified COPD type (H)       nitroGLYcerin 0.4 MG sublingual tablet    NITROSTAT    25 tablet    Place 1 tablet (0.4 mg) under the tongue every 5 minutes as needed for chest pain    History of MI (myocardial infarction)       nystatin 292603 UNIT/GM Powd    MYCOSTATIN    30 g    Apply 1 g topically 3 times daily as needed    Groin rash       * order for DME     1 Device    One wheeled walker with seat and brakes and basket    Risk for falls       * order for DME     2 each    Equipment being ordered: Compression socks. Strength:15-20 mmHg    Localized edema       order for DME     1 each    Equipment being ordered: Diabetic Shoes    Type 2 diabetes mellitus with stage 5 chronic kidney disease not on chronic dialysis, without long-term current use of insulin (H)       order for DME     2 Device    Equipment being ordered: two pairs moderate knee high support hose    Edema, unspecified type       oxyCODONE IR 10 MG tablet    ROXICODONE    90 tablet    Take 1 tablet (10 mg) by mouth every 8 hours as needed for moderate to severe pain    Chronic low back pain without sciatica, unspecified back pain laterality       polyethylene glycol powder    MIRALAX     510 g    Take 17 g (1 capful) by mouth daily as needed for constipation    Slow transit constipation       SM ALCOHOL PREP 70 % Pads     100 each    Externally apply 1 pad topically 4 times daily    Type 2 diabetes mellitus without complication, with long-term current use of insulin (H)       * sodium bicarbonate 325 MG tablet     360 tablet    Take 2 tablets (650 mg) by mouth 2 times daily    Acidosis, CKD (chronic kidney disease) stage 4, GFR 15-29 ml/min (H)       * sodium bicarbonate 650 MG tablet           tiotropium 18 MCG inhaled capsule    SPIRIVA HANDIHALER    90 capsule    Inhale contents of one capsule daily.    Pulmonary emphysema, unspecified emphysema type (H)       vitamin D3 2000 units tablet    CHOLECALCIFEROL    90 tablet    Take 2,000 Units by mouth daily    Vitamin D deficiency disease       * Notice:  This list has 8 medication(s) that are the same as other medications prescribed for you. Read the directions carefully, and ask your doctor or other care provider to review them with you.

## 2018-11-23 NOTE — PATIENT INSTRUCTIONS
Medications refilled today.  Keep checking blood sugars a few times a week.  Schedule with me next month for a check up.

## 2018-11-23 NOTE — PROGRESS NOTES
SUBJECTIVE:                                                    Kim Anne is a 73 year old female with hx of COPD, CKD stage 5, diabetes, hypertension, hx of AMI with stent placement, and chronic low back pain who presents to clinic today for the following health issues:  [facility]: [place]    Patient presents for primary care follow up and medication management.     Her primary care provider is on maternity leave, the patient did not get that message so still came to the clinic at her appt time. She was able to be seen on this provider's schedule.    Seems to be a poor historian. States she has been doing well overall but that her best friend passed away in the past few months, which has been difficult.     Patient with history of multiple chronic medical conditions, as below. Needs medication refills today. Denies any acute illnesses or changes in her health since her last visit.      BP Readings from Last 6 Encounters:   11/23/18 120/60   10/19/18 142/64   10/05/18 139/70   08/30/18 120/64   08/16/18 164/82   08/03/18 183/77     Diabetes Follow-up      Patient is checking blood sugars: sporadically    Typically range in the 90s. Denies any hypoglycemic sx, on Lantus 8 units qam.    Diabetic concerns: None     Symptoms of hypoglycemia (low blood sugar): none     Paresthesias (numbness or burning in feet) or sores: No     Date of last diabetic eye exam: utd 10/23/2018     Diabetes Management Resources    Hyperlipidemia Follow-Up      Rate your low fat/cholesterol diet?: good    Taking statin?  Yes, no muscle aches from statin    Other lipid medications/supplements?:  none    Hypertension Follow-up      Outpatient blood pressures are being checked at home.  Results are variable. Has been out of Norvasc.     Low Salt Diet: no added salt    BP Readings from Last 2 Encounters:   11/23/18 120/60   10/19/18 142/64     Hemoglobin A1C (%)   Date Value   07/26/2018 5.8 (H)   01/16/2018 6.0     LDL Cholesterol Calculated  (mg/dL)   Date Value   10/19/2018 46   10/25/2017 104 (H)       Amount of exercise or physical activity: walking 4 times a day     Problems taking medications regularly: No    Medication side effects: none    Diet: diabetic        Mental Health Follow up Anxiety    Status since last visit: stable    See PHQ-9 for current symptoms.  Other associated symptoms: None    Complicating factors: friend passed away  Significant life event:  No   Current substance abuse:  None  Anxiety or Panic symptoms:  No    Sleep - stable  Appetite - stable  Exercise - denies    Smoking - 3 cigarettes per day,would like to quit.  Alcohol - denies  Street drugs - denies  Marijuana - denies  Caffeine - denies    PHQ-9  English PHQ-9   Any Language           Vascular Disease Follow-up:  Coronary Artery and Peripheral Vascular Disease      Chest pain or pressure, left side neck or arm pain: No    Shortness of breath/increased sweats/nausea with exertion: No    Pain in calves walking 1-2 blocks: No    Worsened or new symptoms since last visit: No    Nitroglycerin use: occasionally    Daily aspirin use: Yes    COPD Follow-Up    Symptoms are currently: slightly worsened - has been out of inhalers x one week.    Om Dulera BID, Spiriva once daily, Albuterol if needed    Current fatigue or dyspnea with ambulation: stable     Shortness of breath: stable    Increased or change in Cough/Sputum: Yes-  Since running out of inhalers.    Fever(s): No        Any ER/UC or hospital admissions since your last visit? No     History   Smoking Status     Current Every Day Smoker     Packs/day: 0.25     Years: 40.00     Types: Cigarettes     Last attempt to quit: 10/31/2016   Smokeless Tobacco     Never Used     No results found for: FEV1, EYF4NFA  Chronic Kidney Disease Follow-up      Current NSAID use?  No    -followed by Nephrology. Now thought to have CKD5, egfr of 8. 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 Per  nephrology to schedule for AV graft and plan to start HD soon.     Back Pain Follow Up      Description:   Location of pain:  left  Character of pain: dull ache  Pain radiation: Does not radiate  Since last visit, pain is:  unchanged  New numbness or weakness in legs, not attributed to pain:  no     Intensity: Currently mild    History:   Pain interferes with job: Not applicable  Therapies tried without relief: rest  Therapies tried with relief: opioids   -Patient prescribed Oxycodone IR 10 mg q8 hrs prn          Accompanying Signs & Symptoms:  Risk of Fracture:  None  Risk of Cauda Equina:  None  Risk of Infection:  None  Risk of Cancer:  None        Problems taking medications regularly: No    Medication side effects: none    Diet: regular (no restrictions)    Social History   Substance Use Topics     Smoking status: Current Every Day Smoker     Packs/day: 0.25     Years: 40.00     Types: Cigarettes     Last attempt to quit: 10/31/2016     Smokeless tobacco: Never Used     Alcohol use No        Problem list and histories reviewed & adjusted, as indicated.  Additional history: as documented    Patient Active Problem List   Diagnosis     Hyperlipidemia LDL goal <100     ARAUZ (dyspnea on exertion)     COPD (chronic obstructive pulmonary disease) (H)     Edema     Fatigue     History of MI (myocardial infarction)     Snores     Knee pain, left     Bunion of great toe of left foot     Dystrophic nail     Arthritis     Peripheral vascular disease (H)     Chronic low back pain     Health Care Home     CKD (chronic kidney disease) stage 4, GFR 15-29 ml/min (H)     Abnormal gait     Advance Care Planning     Vitamin D deficiency     Constipation     Hypertension goal BP (blood pressure) < 130/80     Bradycardia     Callus of foot     Other chronic pain     Cataracts, bilateral     Hyperopic astigmatism of both eyes     Presbyopia     Asymptomatic bunion, right     Acquired hypothyroidism     Type 2 diabetes mellitus with  diabetic chronic kidney disease (H)     Hypertension associated with diabetes (H)     Hyperlipidemia associated with type 2 diabetes mellitus (H)     Obesity (BMI 30-39.9)     History of sick sinus syndrome     Nephrotic syndrome     Pneumonia     Grief     Cigarette nicotine dependence without complication     HCOM with LVOT     Hyperkalemia     Type 2 diabetes, HbA1C goal < 8% (H)     Smoker     Single kidney     Hypothyroid     Hypertension goal BP (blood pressure) < 140/90     Hyperlipidemia     Diabetic nephropathy (H)     Hypoalbuminemia     Skin lesion of hand     ERRONEOUS ENCOUNTER--DISREGARD     JUSTINE (generalized anxiety disorder)     Past Surgical History:   Procedure Laterality Date     APPENDECTOMY       BLEPHAROPLASTY BILATERAL  2013    Procedure: BLEPHAROPLASTY BILATERAL;  BILATERAL UPPER EYELID BLEPHAROPLASTY ;  Surgeon: Olayinka Lyon MD;  Location:  [facility]     CATARACT IOL, RT/LT Bilateral      CHOLECYSTECTOMY       COLONOSCOPY  7/15/2011    polyps repeat in 5 years     elected term pregnancy       HYSTEROSCOPIC PLACEMENT CONTRACEPTIVE DEVICE       KIDNEY SURGERY      donated left kideny     OVARY SURGERY      left for cyst benign     subclavian stent  2010    [facility]       Social History   Substance Use Topics     Smoking status: Current Every Day Smoker     Packs/day: 0.25     Years: 40.00     Types: Cigarettes     Last attempt to quit: 10/31/2016     Smokeless tobacco: Never Used     Alcohol use No     Family History   Problem Relation Age of Onset     Diabetes Mother      brother, MGM, sister     KIDNEY DISEASE Brother      X2 DM two      Alcohol/Drug Child      daughter     Asthma No family hx of      C.A.D. No family hx of      Hypertension No family hx of      Cerebrovascular Disease No family hx of      Breast Cancer No family hx of      Cancer - colorectal No family hx of      Prostate Cancer No family hx of      Allergies No family hx of      Alzheimer Disease  No family hx of      Anesthesia Reaction No family hx of      Arthritis No family hx of      Blood Disease No family hx of      Cancer No family hx of      Cardiovascular No family hx of      Circulatory No family hx of      Congenital Anomalies No family hx of      Connective Tissue Disorder No family hx of      Depression No family hx of      Eye Disorder No family hx of      Genetic Disorder No family hx of      GASTROINTESTINAL DISEASE No family hx of      Genitourinary Problems No family hx of      Gynecology No family hx of      HEART DISEASE No family hx of      Lipids No family hx of      Musculoskeletal Disorder No family hx of      Neurologic Disorder No family hx of      Obesity No family hx of      Osteoporosis No family hx of      Psychotic Disorder No family hx of      Respiratory No family hx of      Thyroid Disease No family hx of      Glaucoma No family hx of      Macular Degeneration No family hx of            Current Outpatient Prescriptions   Medication Sig Dispense Refill     albuterol (PROAIR HFA/PROVENTIL HFA/VENTOLIN HFA) 108 (90 Base) MCG/ACT inhaler Inhale 2 puffs into the lungs every 6 hours as needed for shortness of breath / dyspnea or wheezing 18 g 3     Alcohol Swabs (SM ALCOHOL PREP) 70 % PADS Externally apply 1 pad topically 4 times daily 100 each 11     amLODIPine (NORVASC) 10 MG tablet Take 1 tablet (10 mg) by mouth daily 90 tablet 3     ASPIR-LOW 81 MG EC tablet TAKE 1 TABLET BY MOUTH EVERY  tablet 2     atorvastatin (LIPITOR) 40 MG tablet Take 1 tablet (40 mg) by mouth daily 90 tablet 1     blood glucose monitoring (FREESTYLE LITE) test strip TEST BLOOD SUGAR TWO TIMES A DAY 50 strip 12     blood glucose monitoring (FREESTYLE) lancets Test BS two times daily as directed 100 each 1     blood glucose monitoring (NO BRAND SPECIFIED) meter device kit Use to test blood sugar 2 times daily or as directed. Preferred blood glucose meter OR supplies to accompany: Blood Glucose  Monitor Brands: per insurance. 1 kit 0     Blood Pressure Monitor KIT Automatic Blood Pressure Monitor 1 kit 0     Cholecalciferol (VITAMIN D) 2000 units tablet Take 2,000 Units by mouth daily 90 tablet 3     docusate sodium (COLACE) 100 MG capsule Take 1 capsule (100 mg) by mouth 2 times daily 60 capsule 4     Emollient (EUCERIN CALMING DAILY MOIST) CREA Externally apply 1 dose. topically daily 1 Tube 12     ergocalciferol (ERGOCALCIFEROL) 53915 units capsule Take 1 capsule (50,000 Units) by mouth once a week 8 capsule 0     ferrous sulfate (IRON) 325 (65 Fe) MG tablet        fluticasone (FLONASE) 50 MCG/ACT spray Spray 1-2 sprays into both nostrils daily 1 Bottle 1     furosemide (LASIX) 40 MG tablet Take 1.5 tablets (60 mg) by mouth daily 90 tablet 3     hydrALAZINE (APRESOLINE) 25 MG tablet Take 1 tablet (25 mg) by mouth 2 times daily       hydrALAZINE (APRESOLINE) 50 MG tablet        insulin glargine (LANTUS SOLOSTAR) 100 UNIT/ML pen Inject 8 Units Subcutaneous every morning 15 mL 3     insulin pen needle 31G X 6 MM Use as directed 120 each 3     levothyroxine (SYNTHROID/LEVOTHROID) 200 MCG tablet Take 1 tablet (200 mcg) by mouth daily 90 tablet 0     metoprolol succinate (TOPROL-XL) 25 MG 24 hr tablet Take 1 tablet (25 mg) by mouth daily 90 tablet 3     mometasone-formoterol (DULERA) 100-5 MCG/ACT oral inhaler Inhale 2 puffs into the lungs 2 times daily 1 Inhaler 1     nicotine polacrilex (COMMIT) 2 MG lozenge Dissolve 1 lozenge orally every 4 hours as directed. 100 lozenge 2     nitroGLYcerin (NITROSTAT) 0.4 MG sublingual tablet Place 1 tablet (0.4 mg) under the tongue every 5 minutes as needed for chest pain 25 tablet 0     nystatin (MYCOSTATIN) 106116 UNIT/GM POWD Apply 1 g topically 3 times daily as needed 30 g 1     order for DME Equipment being ordered: Diabetic Shoes 1 each 0     order for DME Equipment being ordered: two pairs moderate knee high support hose 2 Device 1     order for DME Equipment being  ordered: Compression socks.  Strength:15-20 mmHg 2 each 0     order for DME One wheeled walker with seat and brakes and basket 1 Device 0     oxyCODONE IR (ROXICODONE) 10 MG tablet Take 1 tablet (10 mg) by mouth every 8 hours as needed for moderate to severe pain 90 tablet 0     polyethylene glycol (MIRALAX) powder Take 17 g (1 capful) by mouth daily as needed for constipation 510 g 2     sodium bicarbonate 325 MG tablet Take 2 tablets (650 mg) by mouth 2 times daily 360 tablet 3     sodium bicarbonate 650 MG tablet        TECHLITE PEN NEEDLES 32G X 4 MM        tiotropium (SPIRIVA HANDIHALER) 18 MCG capsule Inhale contents of one capsule daily. 90 capsule 3     [DISCONTINUED] albuterol (PROAIR HFA/PROVENTIL HFA/VENTOLIN HFA) 108 (90 Base) MCG/ACT inhaler Inhale 2 puffs into the lungs every 6 hours as needed for shortness of breath / dyspnea or wheezing 18 g 3     [DISCONTINUED] amLODIPine (NORVASC) 10 MG tablet Take 1 tablet (10 mg) by mouth daily 90 tablet 3     [DISCONTINUED] mometasone-formoterol (DULERA) 100-5 MCG/ACT oral inhaler Inhale 2 puffs into the lungs 2 times daily 1 Inhaler 1     [DISCONTINUED] oxyCODONE IR (ROXICODONE) 10 MG tablet Take 1 tablet (10 mg) by mouth every 8 hours as needed for moderate to severe pain 90 tablet 0     [DISCONTINUED] tiotropium (SPIRIVA HANDIHALER) 18 MCG capsule Inhale contents of one capsule daily. 90 capsule 3     Allergies   Allergen Reactions     Contrast Dye Hives and Itching     Clonidine      She had as IP and thinks it made her itchy     Diatrizoate Other (See Comments)     Diltiazem      Severe bradycardia     Iodine-131      Recent Labs   Lab Test  11/16/18   0947  10/19/18   0939  08/03/18   0859  07/26/18   1116   01/16/18   1055   10/25/17   0639  10/20/17   1626   09/07/17   1135   11/02/16   0622   A1C   --    --    --   5.8*   --   6.0   --   6.6*   --    --   6.4*   < >   --    LDL   --   46   --    --    --    --    --   104*   --    --   81   < >   --   "  HDL   --   77   --    --    --    --    --   67   --    --   75   < >   --    TRIG   --   188*   --    --    --    --    --   212*   --    --   283*   < >   --    ALT   --    --    --    --    --    --    --   16   --    --   18   --   21   CR  5.43*   --   3.88*   --    < >  3.34*   < >  2.77*   --    < >  2.70*   < >  2.64*   GFRESTIMATED  8*   --   11*   --    < >  14*   < >  17*   --    < >  17*   < >  18*   GFRESTBLACK  9*   --   14*   --    < >  16*   < >  20*   --    < >  21*   < >  22*   POTASSIUM  5.5*   --   4.9   --    < >  4.8   < >  4.3   --    < >  6.2*   < >  5.6*   TSH   --    --    --   1.12   --    --    --    --   1.04   --   71.77*   < >   --     < > = values in this interval not displayed.      BP Readings from Last 3 Encounters:   11/23/18 120/60   10/19/18 142/64   10/05/18 139/70    Wt Readings from Last 3 Encounters:   11/23/18 166 lb 8 oz (75.5 kg)   11/16/18 167 lb 12.8 oz (76.1 kg)   10/19/18 164 lb (74.4 kg)        Labs reviewed in EPIC  Problem list, Medication list, Allergies, and Medical/Social/Surgical histories reviewed in [facility] and updated as appropriate.     ROS: Constitutional, neuro, ENT, endocrine, pulmonary, cardiac, gastrointestinal, genitourinary, musculoskeletal, integument and psychiatric systems are negative, except as otherwise noted above in the HPI.      OBJECTIVE:                                                    /60  Pulse 85  Temp 98.5  F (36.9  C) (Oral)  Resp 14  Ht 5' 5\" (1.651 m)  Wt 166 lb 8 oz (75.5 kg)  SpO2 96%  BMI 27.71 kg/m2  Body mass index is 27.71 kg/(m^2).  GENERAL: healthy, alert, well nourished, well hydrated, no distress  EYES: Eyes grossly normal to inspection, extraocular movements - intact, and PERRL  HENT: ear canals- normal; TMs- normal; Nose- normal; Mouth- no ulcers, no lesions  NECK: no tenderness, no adenopathy, no asymmetry, no masses, no stiffness; thyroid- normal to palpation  RESP: lungs clear to auscultation -+ rales " RUL, RLL. No use of accessory muscles.   CV: regular rates and rhythm, normal S1 S2, no S3 or S4 and no murmur, no click or rub - no homans or cords  ABDOMEN: soft, no tenderness, no  hepatosplenomegaly, no masses, normal bowel sounds  MS: extremities- no gross deformities noted, no edema  SKIN: no suspicious lesions, no rashes  NEURO: strength and tone- normal, sensory exam- grossly normal, mentation- intact, speech- normal, reflexes- symmetric  Non focal no aphasia. No facial asymmetry.   BACK: no CVA tenderness, + paralumbar tenderness    Mental Status Assessment:  Appearance:   Appropriate   Eye Contact:   Good   Psychomotor Behavior: Normal   Attitude:   Cooperative   Orientation:   All  Speech   Rate / Production: Normal    Volume:  Normal   Mood:    Normal  Affect:    Appropriate   Thought Content:  Clear   Thought Form:  Coherent  Logical   Insight:    Fair   Attention Span and Concentration:  limited  Recent and Remote Memory:  intact  Fund of Knowledge: appropriate  Muscle Strength and Tone: normal   Suicidal Ideation: reports no thoughts, no intention  Hallucination: No  Paranoid-No  Manic-No  Panic-No  Self harm-No      See [facility] notes     Diagnostic Test Results:  Results for orders placed or performed in visit on 11/16/18   Renal panel   Result Value Ref Range    Sodium 142 133 - 144 mmol/L    Potassium 5.5 (H) 3.4 - 5.3 mmol/L    Chloride 116 (H) 94 - 109 mmol/L    Carbon Dioxide 17 (L) 20 - 32 mmol/L    Anion Gap 9 3 - 14 mmol/L    Glucose 85 70 - 99 mg/dL    Urea Nitrogen 42 (H) 7 - 30 mg/dL    Creatinine 5.43 (H) 0.52 - 1.04 mg/dL    GFR Estimate 8 (L) >60 mL/min/1.7m2    GFR Estimate If Black 9 (L) >60 mL/min/1.7m2    Calcium 7.1 (L) 8.5 - 10.1 mg/dL    Phosphorus 5.2 (H) 2.5 - 4.5 mg/dL    Albumin 1.5 (L) 3.4 - 5.0 g/dL   CBC with platelets   Result Value Ref Range    WBC 5.8 4.0 - 11.0 10e9/L    RBC Count 3.44 (L) 3.8 - 5.2 10e12/L    Hemoglobin 9.9 (L) 11.7 - 15.7 g/dL    Hematocrit 32.5 (L) 35.0  - 47.0 %    MCV 95 78 - 100 fl    MCH 28.8 26.5 - 33.0 pg    MCHC 30.5 (L) 31.5 - 36.5 g/dL    RDW 15.5 (H) 10.0 - 15.0 %    Platelet Count 259 150 - 450 10e9/L   Protein  random urine with Creat Ratio   Result Value Ref Range    Protein Random Urine 11.50 g/L    Protein Total Urine g/gr Creatinine 10.45 (H) 0 - 0.2 g/g Cr   Creatinine urine calculation only   Result Value Ref Range    Creatinine Urine 110 mg/dL     *Note: Due to a large number of results and/or encounters for the requested time period, some results have not been displayed. A complete set of results can be found in Results Review.        ASSESSMENT/PLAN:                                                    (J42) Chronic bronchitis, unspecified chronic bronchitis type (H)  (primary encounter diagnosis)  (J43.9) Pulmonary emphysema, unspecified emphysema type (H)  Comment: variable. Has been out of inhalers for a week. No respiratory distress sx.  -refill on inhalers, advised to follow regimen. Smoking cessation encouraged.   Plan: mometasone-formoterol (DULERA) 100-5 MCG/ACT         oral inhaler  Plan: tiotropium (SPIRIVA HANDIHALER) 18 MCG capsule  Plan: albuterol (PROAIR HFA/PROVENTIL HFA/VENTOLIN         HFA) 108 (90 Base) MCG/ACT inhaler            (F41.1) JUSTINE (generalized anxiety disorder)  (F43.21) Grief  Comment: patient states mood has been stable, but that she recently lost her best friend and is grieving. Somewhat guarded in talking about mental health issues.   Plan: [facility] support given.   -continue routine follow up.       (M54.5,  G89.29) Chronic low back pain without sciatica, unspecified back pain laterality  Comment: stable, no new issues, needs a refill.  Denies side effects or concerns with medication.   Plan: oxyCODONE IR (ROXICODONE) 10 MG tablet           (E78.5) Hyperlipidemia LDL goal <100  Comment: on a statin, lipid panel 10/19/18 at goal  Plan: continue current medications    (I25.2) History of MI (myocardial  infarction)  Comment: remote hx of MI, on ASA, beta blocker and statin, has a history of stent placement  Plan: continue to monitor. No new symptoms.     (N18.4) CKD (chronic kidney disease) stage 4, GFR 15-29 ml/min (H)  (I15.0) Renovascular hypertension  Comment: patient followed by Nephrology.  -CKD now stage 5 with eGFR of 8. She is getting scheduled for AV graft, plan is to start dialysis soon.   Plan: continue to monitor, ongoing follow up with Nephrology.    (I10) Hypertension goal BP (blood pressure) < 130/80  Comment: BP stable today.   Plan: refill on Norvasc, continue to monitor.       (E11.22,  Z79.4) Type 2 diabetes mellitus with chronic kidney disease, with long-term current use of insulin, unspecified CKD stage (H)  Comment: patient reports BS has been stable, not checking on a routine.   Plan: continue Lantus, encouraged to check more regularly.  Will follow up in one month with PharmD, can take on care while her PCP is out on leave.     (F17.210) Cigarette nicotine dependence without complication  Comment: cessation advised.   Plan: patient states she is thinking of quitting.               Patient Instructions:  Patient Instructions   Medications refilled today.  Keep checking blood sugars a few times a week.  Schedule with me next month for a check up.                JUNIOR Alex [facility] PR[facility]

## 2018-11-26 DIAGNOSIS — E03.8 OTHER SPECIFIED HYPOTHYROIDISM: ICD-10-CM

## 2018-11-26 DIAGNOSIS — E78.5 HYPERLIPIDEMIA LDL GOAL <100: ICD-10-CM

## 2018-11-26 NOTE — PROGRESS NOTES
[facility] - Integrated Primary Care   November 23, 2018      Behavioral Health Clinician Progress Note    Patient Name: Kim Anne           Service Type: Individual      Service Location:   Face to Face in Clinic     Session Start Time: 09:30 am  Session End Time: 09:50am      Session Length: 16 - 37      Attendees: Patient and PCP    Visit Activities (Refresh list every visit): [facility]    Diagnostic Assessment Date: TBD  Treatment Plan Review Date: TBD  See Flowsheets for today's PHQ-9 and JUSTINE-7 results  Previous PHQ-9:   PHQ-9 SCORE 2/12/2018 3/12/2018 6/25/2018   Total Score - - -   Total Score - - -   Total Score 0 0 0     Previous JUSTINE-7:   JUSTINE-7 SCORE 12/12/2016 3/12/2018 6/25/2018   Total Score - - -   Total Score 0 0 0   Total Score - - -       NAE LEVEL:  NAE Score (Last Two) 2/18/2013 5/23/2014   NAE Raw Score 48 45   Activation Score 80 73.1   NAE Level 4 4       DATA  Extended Session (60+ minutes): No  Interactive Complexity: No  Crisis: No    Treatment Objective(s) Addressed in This Session:  Target Behavior(s): disease management/lifestyle changes manage diabetes better    Grief / Loss: will process grief/loss issues in an adaptive manner  Psychological distress related to Diabetes    Current Stressors / Issues:    [facility] co-visit with patient and PCP in the clinic. Patient reports no major concerns for today's visit other than to review and refill medications. She states she enjoyed spending Thanksgiving with family, and that her overall mood is good. Patient shared that while she misses her best friend who passed away, she is close to her sister and they are in contact daily.   Patient enjoys spending time watching tv, playing bingo, and going to the casino on occasion.   Patient reports that appetite and sleep are stable.    Progress on Treatment Objective(s) / Homework:  Stable - MAINTENANCE (Working to maintain change, with risk of relapse); Intervened by continuing to  positively reinforce healthy behavior choice     Motivational Interviewing    MI Intervention: Expressed Empathy/Understanding, Supported Autonomy, Collaboration, Evocation, Open-ended questions and Reflections: simple and complex     Change Talk Expressed by the Patient: Committment to change Activation Taking steps    Provider Response to Change Talk: E - Evoked more info from patient about behavior change, A - Affirmed patient's thoughts, decisions, or attempts at behavior change, R - Reflected patient's change talk and S - Summarized patient's change talk statements      Care Plan review completed: No    Medication Review:  No changes to current psychiatric medication(s)    Medication Compliance:  Yes    Changes in Health Issues:   Yes: please see medical note by PCP JUNIOR Samuel, CNP    Chemical Use Review:   Substance Use: Chemical use reviewed, no active concerns identified      Tobacco Use: Yes,  .  Client reports frequency of use 6 cigarettes daily. Reviewed information and resources for quitting  Reviewed options for assistance and intervention  Provided encouragement to quit      Assessment: Current Emotional / Mental Status (status of significant symptoms):  Risk status (Self / Other harm or suicidal ideation)  Patient denies a history of suicidal ideation, suicide attempts, self-injurious behavior, homicidal ideation, homicidal behavior and and other safety concerns  Patient denies current fears or concerns for personal safety.  Patient denies current or recent suicidal ideation or behaviors.  Patient denies current or recent homicidal ideation or behaviors.  Patient denies current or recent self injurious behavior or ideation.  Patient denies other safety concerns.  A safety and risk management plan has not been developed at this time, however patient was encouraged to call [facility] / [facility] should there be a change in any of these risk factors.    Appearance:   Appropriate   Eye  Contact:   Fair   Psychomotor Behavior: Normal   Attitude:   Guarded   Orientation:   All  Speech   Rate / Production: Normal    Volume:  Normal   Mood:    Normal  Affect:    Appropriate   Thought Content:  Clear   Thought Form:  Logical   Insight:    Fair     Diagnoses:  1. JUSTINE (generalized anxiety disorder)        Collateral Reports Completed:  Not Applicable    Plan: (Homework, other):  Patient was given information about behavioral services and encouraged to schedule a follow up appointment with the clinic [facility] as needed.  She was also given information about mental health symptoms and treatment options .  CD Recommendations: Maintain Sobriety.       LAMINE Bess, [facility]

## 2018-11-26 NOTE — TELEPHONE ENCOUNTER
levothyroxine (SYNTHROID/LEVOTHROID) 200 MCG tablet  Last Written Prescription Date:  7/2/18  Last Fill Quantity: 90,  # refills: 0   Last office visit: 11/23/2018 with prescribing provider:     Future Office Visit:   Next 5 appointments (look out 90 days)     Jan 04, 2019  2:00 PM CST   Return Visit with JUNIOR Guzman CNP   [facility]    [address]  [address]  [facility] [address]   [phone]            Jan 04, 2019  2:00 PM CST   Return Visit with Ryan Christine [facility] ([facility])    [address]  [address]  [facility] [address]   [phone]                   atorvastatin (LIPITOR) 40 MG tablet  Last Written Prescription Date:  1/15/18  Last Fill Quantity: 90,  # refills: 1   Last office visit: 11/23/2018 with prescribing provider:     Future Office Visit:   Next 5 appointments (look out 90 days)     Jan 04, 2019  2:00 PM CST   Return Visit with JUNIOR Guzman CNP   [facility] ([facility]    [address]  [address]  [facility] [address]   [phone]            Jan 04, 2019  2:00 PM CST   Return Visit with Ryan Christine [facility] ([facility])    [address]  [address]  [facility] [address]   [phone]                 Requested Prescriptions   Pending Prescriptions Disp Refills     atorvastatin (LIPITOR) 40 MG tablet 90 tablet 1     Sig: Take 1 tablet (40 mg) by mouth daily    Statins Protocol Passed    11/26/2018  2:58 PM       Passed - LDL on file in past 12 months    Recent Labs   Lab Test  10/19/18   0939   LDL  46            Passed - No abnormal creatine kinase in past 12 months    Recent Labs   Lab Test  01/05/12   2218   CKT  105               Passed -  "Recent (12 mo) or future (30 days) visit within the authorizing provider's specialty    Patient had office visit in the last 12 months or has a visit in the next 30 days with authorizing provider or within the authorizing provider's specialty.  See \"Patient Info\" tab in inbasket, or \"Choose Columns\" in Meds & Orders section of the refill encounter.             Passed - Patient is age 18 or older       Passed - No active pregnancy on record       Passed - No positive pregnancy test in past 12 months        levothyroxine (SYNTHROID/LEVOTHROID) 200 MCG tablet 90 tablet 0     Sig: Take 1 tablet (200 mcg) by mouth daily    Thyroid Protocol Passed    11/26/2018  2:58 PM       Passed - Patient is 12 years or older       Passed - Recent (12 mo) or future (30 days) visit within the authorizing provider's specialty    Patient had office visit in the last 12 months or has a visit in the next 30 days with authorizing provider or within the authorizing provider's specialty.  See \"Patient Info\" tab in inbasket, or \"Choose Columns\" in Meds & Orders section of the refill encounter.             Passed - Normal TSH on file in past 12 months    Recent Labs   Lab Test  07/26/18   1116   TSH  1.12             Passed - No active pregnancy on record    If patient is pregnant or has had a positive pregnancy test, please check TSH.         Passed - No positive pregnancy test in past 12 months    If patient is pregnant or has had a positive pregnancy test, please check TSH.            "

## 2018-11-27 DIAGNOSIS — Z45.2 ADJUSTMENT AND MANAGEMENT OF VASCULAR ACCESS DEVICE: ICD-10-CM

## 2018-11-27 DIAGNOSIS — N18.5 CHRONIC KIDNEY DISEASE, STAGE 5, KIDNEY FAILURE (H): Primary | ICD-10-CM

## 2018-11-27 RX ORDER — LEVOTHYROXINE SODIUM 200 UG/1
200 TABLET ORAL DAILY
Qty: 90 TABLET | Refills: 1 | Status: ON HOLD | OUTPATIENT
Start: 2018-11-27 | End: 2020-02-01

## 2018-11-27 RX ORDER — ATORVASTATIN CALCIUM 40 MG/1
40 TABLET, FILM COATED ORAL DAILY
Qty: 90 TABLET | Refills: 1 | Status: ON HOLD | OUTPATIENT
Start: 2018-11-27 | End: 2020-02-01

## 2018-11-27 NOTE — TELEPHONE ENCOUNTER
LOV: 11/23/2018    Last FLP: 10/19  Last TSH: 7/26      Prescription approved per [facility] [place] Refill Protocol.  Thanks! Sarah August RN

## 2018-12-06 ENCOUNTER — CARE COORDINATION (OUTPATIENT)
Dept: NEPHROLOGY | Facility: CLINIC | Age: 73
End: 2018-12-06

## 2018-12-06 NOTE — PROGRESS NOTES
Nephrology Note: Nursing Outreach Encounter    REASON FOR CALL:                                                      REASON FOR CALL: Care Coordination                                          SITUATION/BACKROUND:                                                    Patient is being treated for CKD Stage 5.    Patient last seen by Dr. Lopes 11/16. At that time, eGFR was 8 and Dr. Lopes discussed needing to schedule patient for AV Graft surgery.     Unfortunately, patient initially saw Dr. Kramer for AV graft consult in January 2018, but patient will need another consult appointment since Dr. Kramer is no longer here. Patient was scheduled to see Dr. Allen for consult on 12/3, but patient rescheduled it for 1/28.     Attempted to reach patient to discuss scheduling sooner consult and to check on uremic symptoms. Call rang but did not go to voice mail. I will attempt to reach again next week.     Ayush Oneal, RN  Nephrology RN Care Coordinator

## 2018-12-10 DIAGNOSIS — Z00.00 HEALTH CARE MAINTENANCE: Primary | ICD-10-CM

## 2018-12-13 ENCOUNTER — TELEPHONE (OUTPATIENT)
Dept: TRANSPLANT | Facility: CLINIC | Age: 73
End: 2018-12-13

## 2018-12-13 NOTE — TELEPHONE ENCOUNTER
Called Kim to discuss her status on the waitlist. Asked her to call me, left contact name and number.  Also reminded her that our , Janice, is trying to  her as well for fistula appointments.

## 2018-12-20 ENCOUNTER — PATIENT OUTREACH (OUTPATIENT)
Dept: GERIATRIC MEDICINE | Facility: CLINIC | Age: 73
End: 2018-12-20

## 2018-12-20 NOTE — PROGRESS NOTES
Return voicemail from Ayush RIVERA who states that she has been trying to reach member to see how her symptoms are and to monitor her for more urgent needs as her CKD is progressing.  Is scheduled at the end of January for dialysis fistula.  Should be seen sooner then that if possible.    If CM reaches member, will reply message and encourage her to call Ayush back.  Batool Mai RN, BSN, PHN  [facility]  [phone]  Fax: [phone]

## 2018-12-20 NOTE — PROGRESS NOTES
CM notes in epic that there have been several failed attempts to reach member via telephone by nephrology CC.  CM attempts to reach Ayush RIVERA CC,  to follow up.  Left VM requesting call back.  CM attempts to reach member.  A male answers the phone, asks who is calling and then hangs up.   Batool Mai RN, BSN, PHN  [facility]  [phone]  Fax: [phone]

## 2018-12-27 DIAGNOSIS — G89.29 CHRONIC LOW BACK PAIN WITHOUT SCIATICA, UNSPECIFIED BACK PAIN LATERALITY: ICD-10-CM

## 2018-12-27 DIAGNOSIS — M54.50 CHRONIC LOW BACK PAIN WITHOUT SCIATICA, UNSPECIFIED BACK PAIN LATERALITY: ICD-10-CM

## 2018-12-27 RX ORDER — OXYCODONE HYDROCHLORIDE 10 MG/1
10 TABLET ORAL EVERY 8 HOURS PRN
Qty: 24 TABLET | Refills: 0 | Status: SHIPPED | OUTPATIENT
Start: 2018-12-27 | End: 2019-01-04

## 2018-12-27 NOTE — TELEPHONE ENCOUNTER
Bridge to cover to PCP appointment on 1/4 provided.  Script handed to patient.      Jeannie Mcalin, DO on 12/27/2018 at 4:57 PM  (covering provider)

## 2018-12-27 NOTE — TELEPHONE ENCOUNTER
Controlled Substance Refill Request for oxyCODONE IR (ROXICODONE) 10 MG tablet   Last refill: 11/27/18-- #90 for 30 day supply    Last clinic visit: 11/23/18-- with Mireya Mcadams appt: 1/4/19 with Mireya      Controlled substance agreement on file: Yes:  Date 4/18/13.  Documentation in problem list reviewed:  Yes  Processing:  Patient will  in clinic    [place] reviewed:  reviewed- no concerns      Kallie Pritchard RN  12/27/18  4:48 PM

## 2019-01-04 ENCOUNTER — TELEPHONE (OUTPATIENT)
Dept: FAMILY MEDICINE | Facility: CLINIC | Age: 74
End: 2019-01-04

## 2019-01-04 ENCOUNTER — OFFICE VISIT (OUTPATIENT)
Dept: BEHAVIORAL HEALTH | Facility: CLINIC | Age: 74
End: 2019-01-04
Payer: COMMERCIAL

## 2019-01-04 ENCOUNTER — OFFICE VISIT (OUTPATIENT)
Dept: FAMILY MEDICINE | Facility: CLINIC | Age: 74
End: 2019-01-04
Payer: COMMERCIAL

## 2019-01-04 VITALS
HEART RATE: 89 BPM | WEIGHT: 160 LBS | RESPIRATION RATE: 16 BRPM | DIASTOLIC BLOOD PRESSURE: 58 MMHG | BODY MASS INDEX: 26.63 KG/M2 | TEMPERATURE: 98 F | OXYGEN SATURATION: 98 % | SYSTOLIC BLOOD PRESSURE: 128 MMHG

## 2019-01-04 DIAGNOSIS — F41.1 GAD (GENERALIZED ANXIETY DISORDER): Primary | ICD-10-CM

## 2019-01-04 DIAGNOSIS — J44.9 CHRONIC OBSTRUCTIVE PULMONARY DISEASE, UNSPECIFIED COPD TYPE (H): ICD-10-CM

## 2019-01-04 DIAGNOSIS — J43.9 PULMONARY EMPHYSEMA, UNSPECIFIED EMPHYSEMA TYPE (H): ICD-10-CM

## 2019-01-04 DIAGNOSIS — E78.5 HYPERLIPIDEMIA LDL GOAL <100: ICD-10-CM

## 2019-01-04 DIAGNOSIS — M54.50 CHRONIC LOW BACK PAIN WITHOUT SCIATICA, UNSPECIFIED BACK PAIN LATERALITY: ICD-10-CM

## 2019-01-04 DIAGNOSIS — G89.29 CHRONIC LOW BACK PAIN WITHOUT SCIATICA, UNSPECIFIED BACK PAIN LATERALITY: ICD-10-CM

## 2019-01-04 DIAGNOSIS — N18.5 CKD (CHRONIC KIDNEY DISEASE) STAGE 5, GFR LESS THAN 15 ML/MIN (H): ICD-10-CM

## 2019-01-04 DIAGNOSIS — N18.4 CHRONIC KIDNEY DISEASE, STAGE 4 (SEVERE) (H): ICD-10-CM

## 2019-01-04 DIAGNOSIS — J42 CHRONIC BRONCHITIS, UNSPECIFIED CHRONIC BRONCHITIS TYPE (H): ICD-10-CM

## 2019-01-04 DIAGNOSIS — F41.1 GAD (GENERALIZED ANXIETY DISORDER): ICD-10-CM

## 2019-01-04 DIAGNOSIS — K59.00 CONSTIPATION, UNSPECIFIED CONSTIPATION TYPE: ICD-10-CM

## 2019-01-04 DIAGNOSIS — R80.9 TYPE 2 DIABETES MELLITUS WITH MICROALBUMINURIA, WITH LONG-TERM CURRENT USE OF INSULIN (H): Primary | ICD-10-CM

## 2019-01-04 DIAGNOSIS — E11.29 TYPE 2 DIABETES MELLITUS WITH MICROALBUMINURIA, WITH LONG-TERM CURRENT USE OF INSULIN (H): Primary | ICD-10-CM

## 2019-01-04 DIAGNOSIS — Z79.4 TYPE 2 DIABETES MELLITUS WITH MICROALBUMINURIA, WITH LONG-TERM CURRENT USE OF INSULIN (H): Primary | ICD-10-CM

## 2019-01-04 DIAGNOSIS — I10 ESSENTIAL HYPERTENSION: ICD-10-CM

## 2019-01-04 LAB — HBA1C MFR BLD: 5.5 % (ref 0–5.6)

## 2019-01-04 PROCEDURE — 36415 COLL VENOUS BLD VENIPUNCTURE: CPT | Performed by: NURSE PRACTITIONER

## 2019-01-04 PROCEDURE — 90832 PSYTX W PT 30 MINUTES: CPT

## 2019-01-04 PROCEDURE — 83036 HEMOGLOBIN GLYCOSYLATED A1C: CPT | Performed by: NURSE PRACTITIONER

## 2019-01-04 PROCEDURE — 99215 OFFICE O/P EST HI 40 MIN: CPT | Performed by: NURSE PRACTITIONER

## 2019-01-04 RX ORDER — ALBUTEROL SULFATE 90 UG/1
2 AEROSOL, METERED RESPIRATORY (INHALATION) EVERY 6 HOURS PRN
Qty: 18 G | Refills: 3 | Status: SHIPPED | OUTPATIENT
Start: 2019-01-04 | End: 2019-06-28

## 2019-01-04 RX ORDER — HYDRALAZINE HYDROCHLORIDE 25 MG/1
25 TABLET, FILM COATED ORAL 2 TIMES DAILY
Qty: 60 TABLET | Refills: 1 | Status: ON HOLD | OUTPATIENT
Start: 2019-01-04 | End: 2019-05-05

## 2019-01-04 RX ORDER — ADHESIVE BANDAGE 3/4"
1 BANDAGE TOPICAL 2 TIMES DAILY
Qty: 1 EACH | Refills: 0 | Status: SHIPPED | OUTPATIENT
Start: 2019-01-04 | End: 2019-08-22

## 2019-01-04 RX ORDER — DOCUSATE SODIUM 100 MG/1
100 CAPSULE, LIQUID FILLED ORAL 2 TIMES DAILY
Qty: 60 CAPSULE | Refills: 4 | Status: SHIPPED | OUTPATIENT
Start: 2019-01-04 | End: 2019-08-22

## 2019-01-04 RX ORDER — TIOTROPIUM BROMIDE 18 UG/1
CAPSULE ORAL; RESPIRATORY (INHALATION)
Qty: 90 CAPSULE | Refills: 3 | Status: ON HOLD | OUTPATIENT
Start: 2019-01-04 | End: 2019-06-21

## 2019-01-04 RX ORDER — OXYCODONE HYDROCHLORIDE 10 MG/1
10 TABLET ORAL EVERY 8 HOURS PRN
Qty: 90 TABLET | Refills: 0 | Status: SHIPPED | OUTPATIENT
Start: 2019-01-04 | End: 2019-02-01

## 2019-01-04 NOTE — TELEPHONE ENCOUNTER
"[facility] the following[facility]     \"We do not carry the following medication(s) Blood Pressure machine as we have a limited formulary. The previously mention mentioned item will have to be sent to another pharmacy. Please feel free to call with any questions. Thank you\".     [facility]: [phone]    Sakina Fraga  [facility]      "

## 2019-01-04 NOTE — PATIENT INSTRUCTIONS
Schedule appt in the next few weeks to talk about loss of friend.   I can have Hafsa our nurse care coordinator call you.  I re-ordered your medications and blood pressure cuff to start checking your blood pressures.  We can recheck your hemoglobin A1c today.

## 2019-01-04 NOTE — PROGRESS NOTES
SUBJECTIVE:                                                    Kim Anne is a 73 year old female with hx of COPD, CKD stage 5, diabetes, hypertension, hx of AMI with stent placement, and chronic low back pain who presents to clinic today for the following health issues:  [facility]: X[place]itl    Patient presents for primary care follow up and medication management. She denies any changes to her medical conditions. States she has been compliant on her medications. Denies recent illnesses. Still with grief as her best friend passed away last month. She would be open to meeting with a therapist to process this grief.    Patient sees Dr. Lopes with Nephrology, hx of CKD stage 5 with most recent GFR of 8. Per chart review her Nephrology clinic has been trying to contact her regarding placement of an AV graft, but has been unable to reach her. Patient states someone at her apartment has not been giving her the messages. Denies progressive weakness, loss of appetite, or decreased urine output today.      Diabetes Follow-up    Patient is checking blood sugars: once or twice  daily.  Results are as follows:         119    Diabetic concerns: None     Symptoms of hypoglycemia (low blood sugar): none     Paresthesias (numbness or burning in feet) or sores: No     Date of last diabetic eye exam: 3 months ago  Patient on Lantus 8 units QAM  BP Readings from Last 2 Encounters:   01/04/19 128/58   11/23/18 120/60     Hemoglobin A1C (%)   Date Value   01/04/2019 5.5   07/26/2018 5.8 (H)     LDL Cholesterol Calculated (mg/dL)   Date Value   10/19/2018 46   10/25/2017 104 (H)       Diabetes Management Resources  Chronic Pain Follow-Up       Type / Location of Pain: Back Pain - hx of degenerative disc disase  Lumbar XR 7/8/14 IMPRESSION: Multilevel advanced facet degenerative changes. Grade 1  degenerative spondylolisthesis at L4-L5. Multilevel mild degenerative  disc disease.  On chronic opioids Oxycodone IR 10 mg q8h  prn.  Analgesia/pain control:       Recent changes:  varies      Overall control: Tolerable with discomfort  Activity level/function:      Daily activities:  Able to do light housework, cooking    Work:  Unable to work  Adverse effects:  No  Adherance    Taking medication as directed?  Yes    Participating in other treatments: no - exercises and walks  Cannot stand for very long at a time  Risk Factors:    Sleep:  Good    Mood/anxiety:  controlled    Recent family or social stressors:  none noted    Other aggravating factors: prolonged standing  PHQ-9 SCORE 2/12/2018 3/12/2018 6/25/2018   PHQ-9 Total Score - - -   PHQ-9 Total Score - - -   PHQ-9 Total Score 0 0 0     JUSTINE-7 SCORE 12/12/2016 3/12/2018 6/25/2018   Total Score - - -   Total Score 0 0 0   Total Score - - -     Encounter-Level CSA - 04/10/2018:    Controlled Substance Agreement - Scan on 4/18/2018  3:13 PM: CONTROLLED SUBSTANCE AGREEMENT (below)       Encounter-Level CSA - 02/13/2017:    Controlled Substance Agreement - Scan on 2/16/2017  2:30 PM: CONTROLLED SUBSTANCE AGREEMENT (below)       Encounter-Level CSA - 01/11/2017:    Controlled Substance Agreement - Scan on 1/16/2017  6:55 PM: CONTROLLED SUBSTANCE AGREEMENT (below)       Encounter-Level CSA - 03/07/2016:    Controlled Substance Agreement - Scan on 3/8/2016  4:25 AM: CONTROLLED SUBSTANCE AGREEMENT 03/07/16 (below)       Encounter-Level CSA - 03/04/2015:    Controlled Substance Agreement - Scan on 3/5/2015  9:09 AM: Controlled Substance Agreement 03/04/15 (below)       Patient-Level CSA:    There are no patient-level csa.                 Mental Health Follow up Depression     Status since last visit: variable    See PHQ-9 for current symptoms.  Other associated symptoms: None    Complicating factors: friend recently passed away  Significant life event:  No   Current substance abuse:  None  Anxiety or Panic symptoms:  No    Sleep -stable  Watch tv for awhile back to sleep  Appetite - no  changes  Wt Readings from Last 4 Encounters:   19 72.6 kg (160 lb)   18 75.5 kg (166 lb 8 oz)   18 76.1 kg (167 lb 12.8 oz)   10/19/18 74.4 kg (164 lb)     Exercise - denies    Smoking - continues smoking up to  ppd  Alcohol - denies  Street drugs - denies  Marijuana - denies  Caffeine - denies    PHQ-9  English PHQ-9   Any Language           Hyperlipidemia Follow-Up      Rate your low fat/cholesterol diet?: fair    Taking statin?  Yes, no muscle aches from statin    Other lipid medications/supplements?:  none    Hypertension Follow-up      Outpatient blood pressures are being checked at home.  Results are stable.    Low Salt Diet: no added salt    Vascular Disease Follow-up:  Coronary Artery and Peripheral Vascular Disease      Chest pain or pressure, left side neck or arm pain: No    Shortness of breath/increased sweats/nausea with exertion: No    Pain in calves walking 1-2 blocks: No    Worsened or new symptoms since last visit: No    Nitroglycerin use: no    Daily aspirin use: Yes    COPD Follow-Up    Symptoms are currently: stable    Current fatigue or dyspnea with ambulation: none    Shortness of breath: stable    Increased or change in Cough/Sputum: No    Fever(s): No        Any ER/UC or hospital admissions since your last visit? No     History   Smoking Status     Current Every Day Smoker     Packs/day: 0.25     Years: 40.00     Types: Cigarettes     Last attempt to quit: 10/31/2016   Smokeless Tobacco     Never Used     No results found for: FEV1, AGE2GKQ  Chronic Kidney Disease Follow-up      Current NSAID use?  No        Problems taking medications regularly: No    Medication side effects: none    Diet: restrictions due to being on potassium    Social History     Tobacco Use     Smoking status: Current Every Day Smoker     Packs/day: 0.25     Years: 40.00     Pack years: 10.00     Types: Cigarettes     Last attempt to quit: 10/31/2016     Years since quittin.1     Smokeless  tobacco: Never Used   Substance Use Topics     Alcohol use: No     Alcohol/week: 0.0 oz        Problem list and histories reviewed & adjusted, as indicated.  Additional history: as documented    Patient Active Problem List   Diagnosis     Hyperlipidemia LDL goal <100     ARAUZ (dyspnea on exertion)     COPD (chronic obstructive pulmonary disease) (H)     Edema     Fatigue     History of MI (myocardial infarction)     Snores     Knee pain, left     Bunion of great toe of left foot     Dystrophic nail     Arthritis     Peripheral vascular disease (H)     Chronic low back pain     Health Care Home     CKD (chronic kidney disease) stage 4, GFR 15-29 ml/min (H)     Abnormal gait     Advance Care Planning     Vitamin D deficiency     Constipation     Hypertension goal BP (blood pressure) < 130/80     Bradycardia     Callus of foot     Other chronic pain     Cataracts, bilateral     Hyperopic astigmatism of both eyes     Presbyopia     Asymptomatic bunion, right     Acquired hypothyroidism     Type 2 diabetes mellitus with diabetic chronic kidney disease (H)     Hypertension associated with diabetes (H)     Hyperlipidemia associated with type 2 diabetes mellitus (H)     Obesity (BMI 30-39.9)     History of sick sinus syndrome     Nephrotic syndrome     Pneumonia     Grief     Cigarette nicotine dependence without complication     HCOM with LVOT     Hyperkalemia     Type 2 diabetes, HbA1C goal < 8% (H)     Smoker     Single kidney     Hypothyroid     Hypertension goal BP (blood pressure) < 140/90     Hyperlipidemia     Diabetic nephropathy (H)     Hypoalbuminemia     Skin lesion of hand     ERRONEOUS ENCOUNTER--DISREGARD     JUSTINE (generalized anxiety disorder)     Past Surgical History:   Procedure Laterality Date     APPENDECTOMY       BLEPHAROPLASTY BILATERAL  9/18/2013    Procedure: BLEPHAROPLASTY BILATERAL;  BILATERAL UPPER EYELID BLEPHAROPLASTY ;  Surgeon: Olayinka Lyon MD;  Location:  [facility]     CATARACT IOL, RT/LT  Bilateral 2016     CHOLECYSTECTOMY       COLONOSCOPY  7/15/2011    polyps repeat in 5 years     elected term pregnancy       HYSTEROSCOPIC PLACEMENT CONTRACEPTIVE DEVICE       KIDNEY SURGERY      donated left kideny     OVARY SURGERY      left for cyst benign     subclavian stent  2010    [facility]       Social History     Tobacco Use     Smoking status: Current Every Day Smoker     Packs/day: 0.25     Years: 40.00     Pack years: 10.00     Types: Cigarettes     Last attempt to quit: 10/31/2016     Years since quittin.1     Smokeless tobacco: Never Used   Substance Use Topics     Alcohol use: No     Alcohol/week: 0.0 oz     Family History   Problem Relation Age of Onset     Diabetes Mother         brother, MGM, sister     Kidney Disease Brother         X2 DM two      Alcohol/Drug Child         daughter     Asthma No family hx of      C.A.D. No family hx of      Hypertension No family hx of      Cerebrovascular Disease No family hx of      Breast Cancer No family hx of      Cancer - colorectal No family hx of      Prostate Cancer No family hx of      Allergies No family hx of      Alzheimer Disease No family hx of      Anesthesia Reaction No family hx of      Arthritis No family hx of      Blood Disease No family hx of      Cancer No family hx of      Cardiovascular No family hx of      Circulatory No family hx of      Congenital Anomalies No family hx of      Connective Tissue Disorder No family hx of      Depression No family hx of      Eye Disorder No family hx of      Genetic Disorder No family hx of      Gastrointestinal Disease No family hx of      Genitourinary Problems No family hx of      Gynecology No family hx of      Heart Disease No family hx of      Lipids No family hx of      Musculoskeletal Disorder No family hx of      Neurologic Disorder No family hx of      Obesity No family hx of      Osteoporosis No family hx of      Psychotic Disorder No family hx of      Respiratory No  family hx of      Thyroid Disease No family hx of      Glaucoma No family hx of      Macular Degeneration No family hx of            Current Outpatient Medications   Medication Sig Dispense Refill     albuterol (PROAIR HFA/PROVENTIL HFA/VENTOLIN HFA) 108 (90 Base) MCG/ACT inhaler Inhale 2 puffs into the lungs every 6 hours as needed for shortness of breath / dyspnea or wheezing 18 g 3     Alcohol Swabs (SM ALCOHOL PREP) 70 % PADS Externally apply 1 pad topically 4 times daily 100 each 11     amLODIPine (NORVASC) 10 MG tablet Take 1 tablet (10 mg) by mouth daily 90 tablet 3     ASPIR-LOW 81 MG EC tablet TAKE 1 TABLET BY MOUTH EVERY  tablet 2     atorvastatin (LIPITOR) 40 MG tablet Take 1 tablet (40 mg) by mouth daily 90 tablet 1     blood glucose monitoring (FREESTYLE LITE) test strip TEST BLOOD SUGAR TWO TIMES A DAY 50 strip 12     blood glucose monitoring (FREESTYLE) lancets Test BS two times daily as directed 100 each 1     blood glucose monitoring (NO BRAND SPECIFIED) meter device kit Use to test blood sugar 2 times daily or as directed. Preferred blood glucose meter OR supplies to accompany: Blood Glucose Monitor Brands: per insurance. 1 kit 0     Blood Pressure Monitor KIT Automatic Blood Pressure Monitor 1 kit 0     Blood Pressure Monitoring (BLOOD PRESSURE CUFF) MISC 1 each 2 times daily 1 each 0     Cholecalciferol (VITAMIN D) 2000 units tablet Take 2,000 Units by mouth daily 90 tablet 3     docusate sodium (COLACE) 100 MG capsule Take 1 capsule (100 mg) by mouth 2 times daily 60 capsule 4     Emollient (EUCERIN CALMING DAILY MOIST) CREA Externally apply 1 dose. topically daily 1 Tube 12     ergocalciferol (ERGOCALCIFEROL) 60437 units capsule Take 1 capsule (50,000 Units) by mouth once a week 8 capsule 0     ferrous sulfate (IRON) 325 (65 Fe) MG tablet        fluticasone (FLONASE) 50 MCG/ACT spray Spray 1-2 sprays into both nostrils daily 1 Bottle 1     furosemide (LASIX) 40 MG tablet Take 1.5 tablets  (60 mg) by mouth daily 90 tablet 3     hydrALAZINE (APRESOLINE) 25 MG tablet Take 1 tablet (25 mg) by mouth 2 times daily 60 tablet 1     hydrALAZINE (APRESOLINE) 50 MG tablet        insulin glargine (LANTUS SOLOSTAR) 100 UNIT/ML pen Inject 8 Units Subcutaneous every morning 15 mL 3     insulin pen needle 31G X 6 MM Use as directed 120 each 3     levothyroxine (SYNTHROID/LEVOTHROID) 200 MCG tablet Take 1 tablet (200 mcg) by mouth daily 90 tablet 1     metoprolol succinate (TOPROL-XL) 25 MG 24 hr tablet Take 1 tablet (25 mg) by mouth daily 90 tablet 3     mometasone-formoterol (DULERA) 100-5 MCG/ACT inhaler Inhale 2 puffs into the lungs 2 times daily 1 Inhaler 1     nicotine polacrilex (COMMIT) 2 MG lozenge Dissolve 1 lozenge orally every 4 hours as directed. 100 lozenge 2     nitroGLYcerin (NITROSTAT) 0.4 MG sublingual tablet Place 1 tablet (0.4 mg) under the tongue every 5 minutes as needed for chest pain 25 tablet 0     nystatin (MYCOSTATIN) 439724 UNIT/GM POWD Apply 1 g topically 3 times daily as needed 30 g 1     order for DME Equipment being ordered: Diabetic Shoes 1 each 0     order for DME Equipment being ordered: two pairs moderate knee high support hose 2 Device 1     order for DME Equipment being ordered: Compression socks.  Strength:15-20 mmHg 2 each 0     order for DME One wheeled walker with seat and brakes and basket 1 Device 0     oxyCODONE IR (ROXICODONE) 10 MG tablet Take 1 tablet (10 mg) by mouth every 8 hours as needed for moderate to severe pain 90 tablet 0     polyethylene glycol (MIRALAX) powder Take 17 g (1 capful) by mouth daily as needed for constipation 510 g 2     sodium bicarbonate 325 MG tablet Take 2 tablets (650 mg) by mouth 2 times daily 360 tablet 3     sodium bicarbonate 650 MG tablet        TECHLITE PEN NEEDLES 32G X 4 MM        tiotropium (SPIRIVA HANDIHALER) 18 MCG inhaled capsule Inhale contents of one capsule daily. 90 capsule 3     Allergies   Allergen Reactions     Contrast  Dye Hives and Itching     Clonidine      She had as IP and thinks it made her itchy     Diatrizoate Other (See Comments)     Diltiazem      Severe bradycardia     Iodine-131      Recent Labs   Lab Test 01/04/19  1426 11/16/18  0947 10/19/18  0939 08/03/18  0859 07/26/18  1116  01/16/18  1055  10/25/17  0639 10/20/17  1626  09/07/17  1135  11/02/16  0622   A1C 5.5  --   --   --  5.8*  --  6.0  --  6.6*  --   --  6.4*   < >  --    LDL  --   --  46  --   --   --   --   --  104*  --   --  81   < >  --    HDL  --   --  77  --   --   --   --   --  67  --   --  75   < >  --    TRIG  --   --  188*  --   --   --   --   --  212*  --   --  283*   < >  --    ALT  --   --   --   --   --   --   --   --  16  --   --  18  --  21   CR  --  5.43*  --  3.88*  --    < > 3.34*   < > 2.77*  --    < > 2.70*   < > 2.64*   GFRESTIMATED  --  8*  --  11*  --    < > 14*   < > 17*  --    < > 17*   < > 18*   GFRESTBLACK  --  9*  --  14*  --    < > 16*   < > 20*  --    < > 21*   < > 22*   POTASSIUM  --  5.5*  --  4.9  --    < > 4.8   < > 4.3  --    < > 6.2*   < > 5.6*   TSH  --   --   --   --  1.12  --   --   --   --  1.04  --  71.77*   < >  --     < > = values in this interval not displayed.      BP Readings from Last 3 Encounters:   01/04/19 128/58   11/23/18 120/60   10/19/18 142/64    Wt Readings from Last 3 Encounters:   01/04/19 72.6 kg (160 lb)   11/23/18 75.5 kg (166 lb 8 oz)   11/16/18 76.1 kg (167 lb 12.8 oz)        Labs reviewed in EPIC  Problem list, Medication list, Allergies, and Medical/Social/Surgical histories reviewed in [facility] and updated as appropriate.     ROS: Constitutional, neuro, ENT, endocrine, pulmonary, cardiac, gastrointestinal, genitourinary, musculoskeletal, integument and psychiatric systems are negative, except as otherwise noted above in the HPI.       OBJECTIVE:                                                    /58   Pulse 89   Temp 98  F (36.7  C) (Oral)   Resp 16   Wt 72.6 kg (160 lb)   SpO2 98%    BMI 26.63 kg/m    Body mass index is 26.63 kg/m .  GENERAL: healthy, alert, well nourished, well hydrated, no distress    Mental Status Assessment:  Appearance:   Appropriate   Eye Contact:   Good   Psychomotor Behavior: Normal   Attitude:   Cooperative   Orientation:   All  Speech   Rate / Production: Normal    Volume:  Normal   Mood:    Depressed  Normal  Affect:    Appropriate   Thought Content:  Clear   Thought Form:  Coherent  Logical   Insight:    Fair   Attention Span and Concentration:  limited  Recent and Remote Memory:  intact  Fund of Knowledge: appropriate  Muscle Strength and Tone: normal   Suicidal Ideation: reports no thoughts, no intention  Hallucination: No  Paranoid-No  Manic-No  Panic-No  Self harm-No      See [facility] notes     Diagnostic Test Results:  Results for orders placed or performed in visit on 01/04/19   HEMOGLOBIN A1C   Result Value Ref Range    Hemoglobin A1C 5.5 0 - 5.6 %     *Note: Due to a large number of results and/or encounters for the requested time period, some results have not been displayed. A complete set of results can be found in Results Review.        ASSESSMENT/PLAN:                                                    (E11.29,  R80.9,  Z79.4) Type 2 diabetes mellitus with microalbuminuria, with long-term current use of insulin (H)  (primary encounter diagnosis)  Comment: A1c done today, stable.  Continue Lantus at current dose and home glucose monitoring.  Plan: HEMOGLOBIN A1C            (J42) Chronic bronchitis, unspecified chronic bronchitis type (H)  Comment: stable with use of inhalers. Continue Dulera, Spiriva and Albuterol.   Plan: mometasone-formoterol (DULERA) 100-5 MCG/ACT         inhaler  Plan: tiotropium (SPIRIVA HANDIHALER) 18 MCG inhaled         capsule  Plan: albuterol (PROAIR HFA/PROVENTIL HFA/VENTOLIN         HFA) 108 (90 Base) MCG/ACT inhaler      (I10) Essential hypertension  Comment: refill Hydralazine, Norvasc, beta blocker. Patient reports home BP cuff  broken, will place order for new cuff.   BP Readings from Last 6 Encounters:   01/04/19 128/58   11/23/18 120/60   10/19/18 142/64   10/05/18 139/70   08/30/18 120/64   08/16/18 164/82     Plan: hydrALAZINE (APRESOLINE) 25 MG tablet      (K59.00) Constipation, unspecified constipation type  Comment: on chronic opioids. Continue bowel regimen.  Plan: docusate sodium (COLACE) 100 MG capsule      (N18.4) Chronic kidney disease, stage 5 (severe) (H)  Comment: renal function declining. Nephrology has been trying to reach patient about AV graft placement. Will have care coordination follow up on this.  Denies uremic sx at this time.  Does not seem to understand the severity of her condition.  Plan: Blood Pressure Monitoring (BLOOD PRESSURE CUFF)        MISC, hydrALAZINE (APRESOLINE) 25 MG tablet    (M54.5,  G89.29) Chronic low back pain without sciatica, unspecified back pain laterality  Comment: no concerns, medications refilled  Plan: oxyCODONE IR (ROXICODONE) 10 MG tablet    (E78.5) Hyperlipidemia LDL goal <100  Comment: continue statin  Plan: as above    (F41.1) JUSTINE (generalized anxiety disorder)  Comment: some worsening mental health due to loss of friend  Plan: patient agrees to meet with [facility] in clinic to discuss.     (I25.2) History of MI (myocardial infarction)  Comment: remote hx of MI, on ASA, beta blocker and statin, has a history of stent placement  Plan: continue to monitor. No new symptoms.     (F17.210) Cigarette nicotine dependence without complication  Comment: cessation advised.   Plan: patient states she is thinking of quitting.       Patient Instructions:  Patient Instructions   Schedule appt in the next few weeks to talk about loss of friend.   I can have Hafsa our nurse care coordinator call you.  I re-ordered your medications and blood pressure cuff to start checking your blood pressures.  We can recheck your hemoglobin A1c today.                 Mireya Baldwin, JUNIOR [facility]  PRIMARY CARE

## 2019-01-07 NOTE — PROGRESS NOTES
"[facility]   January 4, 2019      Behavioral Health Clinician Progress Note    Patient Name: Kim Anne           Service Type: Individual      Service Location:   Face to Face in Clinic     Session Start Time: 02:00 pm  Session End Time: 02:30pm      Session Length: 16 - 37      Attendees: Patient, PCP and [facility] Intern    Visit Activities (Refresh list every visit): [facility] [employer]    Diagnostic Assessment Date: TBD  Treatment Plan Review Date: TBD  See Flowsheets for today's PHQ-9 and JUSTINE-7 results  Previous PHQ-9:   PHQ-9 SCORE 2/12/2018 3/12/2018 6/25/2018   PHQ-9 Total Score - - -   PHQ-9 Total Score - - -   PHQ-9 Total Score 0 0 0     Previous JUSTINE-7:   JUSTINE-7 SCORE 12/12/2016 3/12/2018 6/25/2018   Total Score - - -   Total Score 0 0 0   Total Score - - -       NAE LEVEL:  NAE Score (Last Two) 2/18/2013 5/23/2014   NAE Raw Score 48 45   Activation Score 80 73.1   NAE Level 4 4       DATA  Extended Session (60+ minutes): No  Interactive Complexity: No  Crisis: No    Treatment Objective(s) Addressed in This Session:  Target Behavior(s): disease management/lifestyle changes manage diabetes better    Grief / Loss: will process grief/loss issues in an adaptive manner  Psychological distress related to Diabetes    Current Stressors / Issues:  [facility] co-visit with patient and PCP in the clinic. Patient reports that her mood has been \"okay\" and that her primary concern for the visit is discussing pain management. Patient reports that back pain is her primary source of pain and the medications she takes are the only things that have helped manage her pain effectively. Assessed how patient has been, as she continues to grieve the loss of her best friend a month ago. Patient became tearful and processed how difficult this loss has been for her. Expressed empathy for this loss and explored with patient current support systems she has that have helped fill the void of her loss. Patient shared " that she has been spending a lot of time with her cousin and this has been helpful for her, but reports that it is still very hard for her. Suggested seeing a therapist to talk about some of these feelings more and patient said she is open to meeting with [facility] [place] Intern next week. Patient reports that her sleep has been normal and that her appetite is good. Patient denies any SI/SIB thoughts or urges and will schedule a follow-up appointment with JANE, Mireya Baldwin, in one month.    Progress on Treatment Objective(s) / Homework:  Minimal progress - PREPARATION (Decided to change - considering how); Intervened by negotiating a change plan and determining options / strategies for behavior change, identifying triggers, exploring social supports, and working towards setting a date to begin behavior change    Motivational Interviewing    MI Intervention: Expressed Empathy/Understanding, Supported Autonomy, Collaboration, Evocation, Open-ended questions and Reflections: simple and complex     Change Talk Expressed by the Patient: Committment to change Activation Taking steps    Provider Response to Change Talk: E - Evoked more info from patient about behavior change, A - Affirmed patient's thoughts, decisions, or attempts at behavior change, R - Reflected patient's change talk and S - Summarized patient's change talk statements      Care Plan review completed: No    Medication Review:  No changes to current psychiatric medication(s)    Medication Compliance:  Yes    Changes in Health Issues:   Yes: Pain, Associated Psychological Distress  Please see medical note.    Chemical Use Review:   Substance Use: Chemical use reviewed, no active concerns identified      Tobacco Use: Yes,  .  Client reports frequency of use 6 cigarettes daily. Reviewed information and resources for quitting  Reviewed options for assistance and intervention  Provided encouragement to quit      Assessment: Current Emotional / Mental Status (status of  significant symptoms):  Risk status (Self / Other harm or suicidal ideation)  Patient denies a history of suicidal ideation, suicide attempts, self-injurious behavior, homicidal ideation, homicidal behavior and and other safety concerns  Patient denies current fears or concerns for personal safety.  Patient denies current or recent suicidal ideation or behaviors.  Patient denies current or recent homicidal ideation or behaviors.  Patient denies current or recent self injurious behavior or ideation.  Patient denies other safety concerns.  A safety and risk management plan has not been developed at this time, however patient was encouraged to call [place] [phone] should there be a change in any of these risk factors.    Appearance:   Appropriate   Eye Contact:   Fair   Psychomotor Behavior: Normal   Attitude:   Guarded   Orientation:   All  Speech   Rate / Production: Normal    Volume:  Normal   Mood:    Normal Sad   Affect:    Appropriate  Tearful   Thought Content:  Clear   Thought Form:  Logical   Insight:    Fair     Diagnoses:  1. JUSTINE (generalized anxiety disorder)        Collateral Reports Completed:  Not Applicable    Plan: (Homework, other):  Patient was given information about behavioral services and was encouraged to schedule a follow up appointment with the clinic [facility] in 1 week.  She was also given information about mental health symptoms and treatment options .  CD Recommendations: Maintain Sobriety.  Xochitl Ponce, [facility] MSW Clinical Intern    Supervised/Reviewed:   LAMINE Bess, [facility]

## 2019-01-15 NOTE — TELEPHONE ENCOUNTER
LM with male that answered home # to RTC to clinic, we need an alternate pharmacy for Blood pressure monitor.  Roxanne Leyva RN

## 2019-01-28 ENCOUNTER — PATIENT OUTREACH (OUTPATIENT)
Dept: GERIATRIC MEDICINE | Facility: CLINIC | Age: 74
End: 2019-01-28

## 2019-01-28 DIAGNOSIS — N18.4 CKD (CHRONIC KIDNEY DISEASE) STAGE 4, GFR 15-29 ML/MIN (H): Primary | ICD-10-CM

## 2019-01-28 NOTE — PROGRESS NOTES
[facility] Care Coordination Contact    Arranged transportation thru [facility] for the below appt:  Appt Date & Time: 1/30/19 at 10:00AM  Clinic Name & Address:  [facility]  Transportation Provider: [employer]   time:  9:00-9:30AM    Notified member of  time.    Arranged transportation thru [facility] for the below appt:  Appt Date & Time: 2/1/19 at 1:00PM  [place]  [facility] [place] Provider: [employer]   time:  12:00-12:30PM    Notified member of  time.    Alisa Griffith  Care Management Specialist  [facility]  [phone]

## 2019-02-01 ENCOUNTER — OFFICE VISIT (OUTPATIENT)
Dept: FAMILY MEDICINE | Facility: CLINIC | Age: 74
End: 2019-02-01
Payer: COMMERCIAL

## 2019-02-01 ENCOUNTER — OFFICE VISIT (OUTPATIENT)
Dept: BEHAVIORAL HEALTH | Facility: CLINIC | Age: 74
End: 2019-02-01
Payer: COMMERCIAL

## 2019-02-01 VITALS
DIASTOLIC BLOOD PRESSURE: 64 MMHG | WEIGHT: 159 LBS | HEIGHT: 65 IN | BODY MASS INDEX: 26.49 KG/M2 | TEMPERATURE: 98.1 F | SYSTOLIC BLOOD PRESSURE: 130 MMHG | HEART RATE: 96 BPM | RESPIRATION RATE: 16 BRPM | OXYGEN SATURATION: 98 %

## 2019-02-01 DIAGNOSIS — R80.9 TYPE 2 DIABETES MELLITUS WITH MICROALBUMINURIA, WITH LONG-TERM CURRENT USE OF INSULIN (H): ICD-10-CM

## 2019-02-01 DIAGNOSIS — N18.4 CHRONIC KIDNEY DISEASE, STAGE 4 (SEVERE) (H): ICD-10-CM

## 2019-02-01 DIAGNOSIS — E11.29 TYPE 2 DIABETES MELLITUS WITH MICROALBUMINURIA, WITH LONG-TERM CURRENT USE OF INSULIN (H): ICD-10-CM

## 2019-02-01 DIAGNOSIS — M54.50 CHRONIC LOW BACK PAIN WITHOUT SCIATICA, UNSPECIFIED BACK PAIN LATERALITY: Primary | ICD-10-CM

## 2019-02-01 DIAGNOSIS — I10 ESSENTIAL HYPERTENSION: ICD-10-CM

## 2019-02-01 DIAGNOSIS — F43.22 ADJUSTMENT DISORDER WITH ANXIOUS MOOD: Primary | ICD-10-CM

## 2019-02-01 DIAGNOSIS — I25.2 HISTORY OF MI (MYOCARDIAL INFARCTION): ICD-10-CM

## 2019-02-01 DIAGNOSIS — F17.200 TOBACCO DEPENDENCE SYNDROME: ICD-10-CM

## 2019-02-01 DIAGNOSIS — Z79.4 TYPE 2 DIABETES MELLITUS WITH MICROALBUMINURIA, WITH LONG-TERM CURRENT USE OF INSULIN (H): ICD-10-CM

## 2019-02-01 DIAGNOSIS — J43.9 PULMONARY EMPHYSEMA, UNSPECIFIED EMPHYSEMA TYPE (H): ICD-10-CM

## 2019-02-01 DIAGNOSIS — N18.5 CKD (CHRONIC KIDNEY DISEASE) STAGE 5, GFR LESS THAN 15 ML/MIN (H): ICD-10-CM

## 2019-02-01 DIAGNOSIS — J34.89 RHINORRHEA: ICD-10-CM

## 2019-02-01 DIAGNOSIS — G89.29 CHRONIC LOW BACK PAIN WITHOUT SCIATICA, UNSPECIFIED BACK PAIN LATERALITY: Primary | ICD-10-CM

## 2019-02-01 PROCEDURE — 99215 OFFICE O/P EST HI 40 MIN: CPT | Performed by: NURSE PRACTITIONER

## 2019-02-01 PROCEDURE — 90834 PSYTX W PT 45 MINUTES: CPT

## 2019-02-01 RX ORDER — OXYCODONE HYDROCHLORIDE 10 MG/1
10 TABLET ORAL EVERY 8 HOURS PRN
Qty: 90 TABLET | Refills: 0 | Status: SHIPPED | OUTPATIENT
Start: 2019-02-04 | End: 2019-03-04

## 2019-02-01 RX ORDER — FLUTICASONE PROPIONATE 50 MCG
1 SPRAY, SUSPENSION (ML) NASAL DAILY
Qty: 9.9 ML | Refills: 1 | Status: SHIPPED | OUTPATIENT
Start: 2019-02-01 | End: 2019-09-04

## 2019-02-01 ASSESSMENT — MIFFLIN-ST. JEOR: SCORE: 1227.1

## 2019-02-01 NOTE — PROGRESS NOTES
SUBJECTIVE:                                                    Kim Anne is a 73 year old femalewith hx of COPD, CKD stage 5, diabetes, hypertension, hx of AMI with stent placement, and chronic low back pain   who presents to clinic today for the following health issues:  [facility]: Xochitl    Patient presents for primary care follow up and medication management. She was noted to miss her vascular access appt to placed a dialysis fistula, as well as her Nephrology appt, within the past week. She is in stage V renal failure, and her most recent GFR was 8. She continues to deny any changes in symptoms, including confusion, urinary issues or pain. Patient states she missed those appointments due to the cold weather, and states that her niece recently had an MI. Additionally, she states her other niece's  passed away of cardiac issues and she is going to the  today.    The patient met with the [facility] intern Xochitl after our appointment, and evidently told her that she is actually very nervous about starting dialysis. She shared that her friend who recently  was on dialysis and she saw how time consuming it was. She also shared that she is afraid of missing out on spending time with her sisters who live up [place] a  [place]ervation if she has to start dialysis.         Diabetes Follow-up    Patient is checking blood sugars: twice daily.    Blood sugar testing frequency justification: on insulin  Results are as follows: patient states BS has been stable. She remains on Lantus 8 units in the AM.              Diabetic concerns: None     Symptoms of hypoglycemia (low blood sugar): nervousness     Paresthesias (numbness or burning in feet) or sores: No     Date of last diabetic eye exam: a couple months ago    Diabetes Management Resources          Hypertension Follow-up      Outpatient blood pressures are not being checked.    Low Salt Diet: no added salt    BP Readings from Last 2 Encounters:    01/04/19 128/58   11/23/18 120/60     Hemoglobin A1C (%)   Date Value   01/04/2019 5.5   07/26/2018 5.8 (H)     LDL Cholesterol Calculated (mg/dL)   Date Value   10/19/2018 46   10/25/2017 104 (H)           Mental Health Follow up Depression    Status since last visit: unchanged    See PHQ-9 for current symptoms.  Other associated symptoms: None    Complicating factors: loss of a friend recently. Medical co-morbidities  Significant life event:  No   Current substance abuse:  None  Anxiety or Panic symptoms:  No    Sleep - stable  Appetite - eating but not as much  Over the past few weeks has been more nervous  Friends and family are supportive    Exercise - denies     Smoking - unchanged. Denies readiness to quit  Alcohol - denies  Street drugs - denies  Marijuana - denies  Caffeine - denies    PHQ-9  English PHQ-9   Any Language           Hyperlipidemia Follow-Up      Rate your low fat/cholesterol diet?: fair    Taking statin?  Yes, no muscle aches from statin    Other lipid medications/supplements?:  none    Hypertension Follow-up      Outpatient blood pressures are being checked at home.  Results are stable  BP Readings from Last 6 Encounters:   02/01/19 130/64   01/04/19 128/58   11/23/18 120/60   10/19/18 142/64   10/05/18 139/70   08/30/18 120/64         Low Salt Diet: no added salt    Vascular Disease Follow-up:  Peripheral Vascular Disease (PVD) and CAD      Chest pain or pressure, left side neck or arm pain: No    Shortness of breath/increased sweats/nausea with exertion: No    Pain in calves walking 1-2 blocks: No    Worsened or new symptoms since last visit: No    Nitroglycerin use: no    Daily aspirin use: Yes    COPD Follow-Up    Symptoms are currently: slightly worsened    Current fatigue or dyspnea with ambulation: stable     Shortness of breath: slightly worsened    Increased or change in Cough/Sputum: No    Fever(s): No    Some nasal congestion/rhinorrhea seems to be worsening her cough.    Using  rescue inhaler BID. Also uses Spiriva daily.    Smokes up to 1/2 ppd        Any ER/UC or hospital admissions since your last visit? No     History   Smoking Status     Current Every Day Smoker     Packs/day: 0.25     Years: 40.00     Types: Cigarettes     Last attempt to quit: 10/31/2016   Smokeless Tobacco     Never Used     No results found for: FEV1, [id number]  Chronic Kidney Disease Follow-up      Current NSAID use?  No    Back Pain Follow Up      Description:   Back Pain - hx of degenerative disc disase  Lumbar XR 14 IMPRESSION: Multilevel advanced facet degenerative changes. Grade 1  degenerative spondylolisthesis at L4-L5. Multilevel mild degenerative  disc disease.  Location of pain:  center  Character of pain: dull ache and gnawing  Pain radiation: Does not radiate  Since last visit, pain is:  unchanged  New numbness or weakness in legs, not attributed to pain:  no     Intensity: moderate    History:   Pain interferes with job: Not applicable,   Therapies tried without relief: rest  Therapies tried with relief: opioids           Accompanying Signs & Symptoms:  Risk of Fracture:  None  Risk of Cauda Equina:  None  Risk of Infection:  None  Risk of Cancer:  None          Problems taking medications regularly: No    Medication side effects: none    Diet: low salt    Social History     Tobacco Use     Smoking status: Current Every Day Smoker     Packs/day: 0.25     Years: 40.00     Pack years: 10.00     Types: Cigarettes     Last attempt to quit: 10/31/2016     Years since quittin.2     Smokeless tobacco: Never Used   Substance Use Topics     Alcohol use: No     Alcohol/week: 0.0 oz        Problem list and histories reviewed & adjusted, as indicated.  Additional history: as documented    Patient Active Problem List   Diagnosis     Hyperlipidemia LDL goal <100     ARAUZ (dyspnea on exertion)     COPD (chronic obstructive pulmonary disease) (H)     Edema     Fatigue     History of MI (myocardial infarction)      Snores     Knee pain, left     Bunion of great toe of left foot     Dystrophic nail     Arthritis     Peripheral vascular disease (H)     Chronic low back pain     Health Care Home     CKD (chronic kidney disease) stage 4, GFR 15-29 ml/min (H)     Abnormal gait     Advance Care Planning     Vitamin D deficiency     Constipation     Hypertension goal BP (blood pressure) < 130/80     Bradycardia     Callus of foot     Other chronic pain     Cataracts, bilateral     Hyperopic astigmatism of both eyes     Presbyopia     Asymptomatic bunion, right     Acquired hypothyroidism     Type 2 diabetes mellitus with diabetic chronic kidney disease (H)     Hypertension associated with diabetes (H)     Hyperlipidemia associated with type 2 diabetes mellitus (H)     Obesity (BMI 30-39.9)     History of sick sinus syndrome     Nephrotic syndrome     Pneumonia     Grief     Cigarette nicotine dependence without complication     HCOM with LVOT     Hyperkalemia     Type 2 diabetes, HbA1C goal < 8% (H)     Smoker     Single kidney     Hypothyroid     Hypertension goal BP (blood pressure) < 140/90     Hyperlipidemia     Diabetic nephropathy (H)     Hypoalbuminemia     Skin lesion of hand     ERRONEOUS ENCOUNTER--DISREGARD     JUSTINE (generalized anxiety disorder)     Past Surgical History:   Procedure Laterality Date     APPENDECTOMY       BLEPHAROPLASTY BILATERAL  9/18/2013    Procedure: BLEPHAROPLASTY BILATERAL;  BILATERAL UPPER EYELID BLEPHAROPLASTY ;  Surgeon: Olayinka Lyon MD;  Location:  [facility]     CATARACT IOL, RT/LT Bilateral 2016     CHOLECYSTECTOMY       COLONOSCOPY  7/15/2011    polyps repeat in 5 years     elected term pregnancy       HYSTEROSCOPIC PLACEMENT CONTRACEPTIVE DEVICE       KIDNEY SURGERY  1988    donated left kideny     OVARY SURGERY      left for cyst benign     subclavian stent  august 2010     [facility]       Social History     Tobacco Use     Smoking status: Current Every Day Smoker     Packs/day: 0.25      Years: 40.00     Pack years: 10.00     Types: Cigarettes     Last attempt to quit: 10/31/2016     Years since quittin.2     Smokeless tobacco: Never Used   Substance Use Topics     Alcohol use: No     Alcohol/week: 0.0 oz     Family History   Problem Relation Age of Onset     Diabetes Mother         brother, MGM, sister     Kidney Disease Brother         X2 DM two      Alcohol/Drug Child         daughter     Asthma No family hx of      C.A.D. No family hx of      Hypertension No family hx of      Cerebrovascular Disease No family hx of      Breast Cancer No family hx of      Cancer - colorectal No family hx of      Prostate Cancer No family hx of      Allergies No family hx of      Alzheimer Disease No family hx of      Anesthesia Reaction No family hx of      Arthritis No family hx of      Blood Disease No family hx of      Cancer No family hx of      Cardiovascular No family hx of      Circulatory No family hx of      Congenital Anomalies No family hx of      Connective Tissue Disorder No family hx of      Depression No family hx of      Eye Disorder No family hx of      Genetic Disorder No family hx of      Gastrointestinal Disease No family hx of      Genitourinary Problems No family hx of      Gynecology No family hx of      Heart Disease No family hx of      Lipids No family hx of      Musculoskeletal Disorder No family hx of      Neurologic Disorder No family hx of      Obesity No family hx of      Osteoporosis No family hx of      Psychotic Disorder No family hx of      Respiratory No family hx of      Thyroid Disease No family hx of      Glaucoma No family hx of      Macular Degeneration No family hx of            Current Outpatient Medications   Medication Sig Dispense Refill     albuterol (PROAIR HFA/PROVENTIL HFA/VENTOLIN HFA) 108 (90 Base) MCG/ACT inhaler Inhale 2 puffs into the lungs every 6 hours as needed for shortness of breath / dyspnea or wheezing 18 g 3     Alcohol Swabs (SM ALCOHOL  PREP) 70 % PADS Externally apply 1 pad topically 4 times daily 100 each 11     amLODIPine (NORVASC) 10 MG tablet Take 1 tablet (10 mg) by mouth daily 90 tablet 3     ASPIR-LOW 81 MG EC tablet TAKE 1 TABLET BY MOUTH EVERY  tablet 2     atorvastatin (LIPITOR) 40 MG tablet Take 1 tablet (40 mg) by mouth daily 90 tablet 1     blood glucose monitoring (FREESTYLE LITE) test strip TEST BLOOD SUGAR TWO TIMES A DAY 50 strip 12     blood glucose monitoring (FREESTYLE) lancets Test BS two times daily as directed 100 each 1     blood glucose monitoring (NO BRAND SPECIFIED) meter device kit Use to test blood sugar 2 times daily or as directed. Preferred blood glucose meter OR supplies to accompany: Blood Glucose Monitor Brands: per insurance. 1 kit 0     Blood Pressure Monitor KIT Automatic Blood Pressure Monitor 1 kit 0     Blood Pressure Monitoring (BLOOD PRESSURE CUFF) MISC 1 each 2 times daily 1 each 0     Cholecalciferol (VITAMIN D) 2000 units tablet Take 2,000 Units by mouth daily 90 tablet 3     docusate sodium (COLACE) 100 MG capsule Take 1 capsule (100 mg) by mouth 2 times daily 60 capsule 4     Emollient (EUCERIN CALMING DAILY MOIST) CREA Externally apply 1 dose. topically daily 1 Tube 12     ergocalciferol (ERGOCALCIFEROL) 36664 units capsule Take 1 capsule (50,000 Units) by mouth once a week 8 capsule 0     ferrous sulfate (IRON) 325 (65 Fe) MG tablet        fluticasone (FLONASE) 50 MCG/ACT spray Spray 1-2 sprays into both nostrils daily 1 Bottle 1     furosemide (LASIX) 40 MG tablet Take 1.5 tablets (60 mg) by mouth daily 90 tablet 3     hydrALAZINE (APRESOLINE) 25 MG tablet Take 1 tablet (25 mg) by mouth 2 times daily 60 tablet 1     hydrALAZINE (APRESOLINE) 50 MG tablet        insulin glargine (LANTUS SOLOSTAR) 100 UNIT/ML pen Inject 8 Units Subcutaneous every morning 15 mL 3     insulin pen needle 31G X 6 MM Use as directed 120 each 3     levothyroxine (SYNTHROID/LEVOTHROID) 200 MCG tablet Take 1 tablet  (200 mcg) by mouth daily 90 tablet 1     metoprolol succinate (TOPROL-XL) 25 MG 24 hr tablet Take 1 tablet (25 mg) by mouth daily 90 tablet 3     mometasone-formoterol (DULERA) 100-5 MCG/ACT inhaler Inhale 2 puffs into the lungs 2 times daily 1 Inhaler 1     nicotine polacrilex (COMMIT) 2 MG lozenge Dissolve 1 lozenge orally every 4 hours as directed. 100 lozenge 2     nitroGLYcerin (NITROSTAT) 0.4 MG sublingual tablet Place 1 tablet (0.4 mg) under the tongue every 5 minutes as needed for chest pain 25 tablet 0     nystatin (MYCOSTATIN) 192811 UNIT/GM POWD Apply 1 g topically 3 times daily as needed 30 g 1     order for DME Equipment being ordered: Diabetic Shoes 1 each 0     order for DME Equipment being ordered: two pairs moderate knee high support hose 2 Device 1     order for DME Equipment being ordered: Compression socks.  Strength:15-20 mmHg 2 each 0     order for DME One wheeled walker with seat and brakes and basket 1 Device 0     oxyCODONE IR (ROXICODONE) 10 MG tablet Take 1 tablet (10 mg) by mouth every 8 hours as needed for moderate to severe pain 90 tablet 0     polyethylene glycol (MIRALAX) powder Take 17 g (1 capful) by mouth daily as needed for constipation 510 g 2     sodium bicarbonate 325 MG tablet Take 2 tablets (650 mg) by mouth 2 times daily 360 tablet 3     sodium bicarbonate 650 MG tablet        TECHLITE PEN NEEDLES 32G X 4 MM        tiotropium (SPIRIVA HANDIHALER) 18 MCG inhaled capsule Inhale contents of one capsule daily. 90 capsule 3     Allergies   Allergen Reactions     Contrast Dye Hives and Itching     Clonidine      She had as IP and thinks it made her itchy     Diatrizoate Other (See Comments)     Diltiazem      Severe bradycardia     Iodine-131      Recent Labs   Lab Test 01/04/19  1426 11/16/18  0947 10/19/18  0939 08/03/18  0859 07/26/18  1116  01/16/18  1055  10/25/17  0639 10/20/17  1626  09/07/17  1135  11/02/16  0622   A1C 5.5  --   --   --  5.8*  --  6.0  --  6.6*  --   --   6.4*   < >  --    LDL  --   --  46  --   --   --   --   --  104*  --   --  81   < >  --    HDL  --   --  77  --   --   --   --   --  67  --   --  75   < >  --    TRIG  --   --  188*  --   --   --   --   --  212*  --   --  283*   < >  --    ALT  --   --   --   --   --   --   --   --  16  --   --  18  --  21   CR  --  5.43*  --  3.88*  --    < > 3.34*   < > 2.77*  --    < > 2.70*   < > 2.64*   GFRESTIMATED  --  8*  --  11*  --    < > 14*   < > 17*  --    < > 17*   < > 18*   GFRESTBLACK  --  9*  --  14*  --    < > 16*   < > 20*  --    < > 21*   < > 22*   POTASSIUM  --  5.5*  --  4.9  --    < > 4.8   < > 4.3  --    < > 6.2*   < > 5.6*   TSH  --   --   --   --  1.12  --   --   --   --  1.04  --  71.77*   < >  --     < > = values in this interval not displayed.      BP Readings from Last 3 Encounters:   01/04/19 128/58   11/23/18 120/60   10/19/18 142/64    Wt Readings from Last 3 Encounters:   01/04/19 72.6 kg (160 lb)   11/23/18 75.5 kg (166 lb 8 oz)   11/16/18 76.1 kg (167 lb 12.8 oz)        Labs reviewed in EPIC  Problem list, Medication list, Allergies, and Medical/Social/Surgical histories reviewed in [facility] and updated as appropriate.     ROS: Constitutional, neuro, ENT, endocrine, pulmonary, cardiac, gastrointestinal, genitourinary, musculoskeletal, integument and psychiatric systems are negative, except as otherwise noted above in the HPI.       OBJECTIVE:                                                    There were no vitals taken for this visit.  There is no height or weight on file to calculate BMI.  GENERAL: healthy, alert, well nourished, well hydrated, no distress  EYES: Eyes grossly normal to inspection, extraocular movements - intact, and PERRL  HENT: ear canals- normal; TMs- normal; Nose- normal; Mouth- no ulcers, no lesions  NECK: no tenderness, no adenopathy, no asymmetry, no masses, no stiffness; thyroid- normal to palpation  RESP: lungs clear to auscultation - no rales, no rhonchi, no wheezes  CV:  regular rates and rhythm, normal S1 S2, no S3 or S4 and no murmur, no click or rub - no homans or cords  ABDOMEN: soft, no tenderness, no  hepatosplenomegaly, no masses, normal bowel sounds  MS: extremities- no gross deformities noted, no edema  SKIN: no suspicious lesions, no rashes  NEURO: strength and tone- normal, sensory exam- grossly normal, mentation- intact, speech- normal, reflexes- symmetric  Non focal no aphasia. No facial asymmetry. Finger to nose, rapid alteration, finger thumb opposition, heel knee shin are normal.  BACK: no CVA tenderness, no paralumbar tenderness  LYMPHATICS: ant. cervical- normal, post. cervical- normal, axillary- normal, supraclavicular- normal, inguinal- normal  Mental Status Assessment:  Appearance:   Appropriate   Eye Contact:   Fair   Psychomotor Behavior: Normal   Attitude:   Cooperative   Orientation:   All  Speech   Rate / Production: Normal    Volume:  Normal   Mood:    Depressed  Normal  Affect:    Appropriate   Thought Content:  Clear   Thought Form:  Coherent  Goal Directed  Logical   Insight:    Fair   Attention Span and Concentration:  limited  Recent and Remote Memory:  intact  Fund of Knowledge: appropriate  Muscle Strength and Tone: normal   Suicidal Ideation: reports no thoughts, no intention  Hallucination: No  Paranoid-No  Manic-No  Panic-No  Self harm-No      See [facility] notes     Diagnostic Test Results:  Results for orders placed or performed in visit on 01/04/19   HEMOGLOBIN A1C   Result Value Ref Range    Hemoglobin A1C 5.5 0 - 5.6 %     *Note: Due to a large number of results and/or encounters for the requested time period, some results have not been displayed. A complete set of results can be found in Results Review.        ASSESSMENT/PLAN:                                                    (M54.5,  G89.29) Chronic low back pain without sciatica, unspecified back pain laterality  (primary encounter diagnosis)  Comment: patient on chronic opioids through  another provider.  No concerns for abuse or misuse. However, may need to revisit medication choices as her renal function is worsening and she continues to put off starting dialysis.  Will continue to advise on non-pharmacologic measures.  Plan: oxyCODONE IR (ROXICODONE) 10 MG tablet      (J43.9) Pulmonary emphysema, unspecified emphysema type (H)  Comment: on Dulera, Spiriva and Albuterol rescue inhaler.  -advised on smoking cessation.  Plan: will have PharmD assist at next appointment to ensure patient understands the medications she is on and assist with any concerns.    (E11.29,  R80.9,  Z79.4) Type 2 diabetes mellitus with microalbuminuria, with long-term current use of insulin (H)  Comment: BS has been stable, denies hypoglycemia  Plan: continue current regimen.    (N18.5) CKD (chronic kidney disease) stage 5, GFR less than 15 ml/min (H)  Comment: patient with stage V renal failure, has no-showed multiple appointments with Nephrology. She has been advised to start dialysis.  Plan: patient processed her concerns about starting dialysis with the [facility] intern. I reached out to their care coordinator to see if they could assist her in rescheduling her appointments. She seems to understand she needs to follow up but has been non-compliant.    (J34.89) Rhinorrhea  Comment: patient reports some worsening nasal congestion, advised on starting a nasal spray. Would also benefit from using a humidifier.  Plan: fluticasone (FLONASE) 50 MCG/ACT nasal spray      (I10) Essential hypertension  Comment: patient on Hydralazine, Norvasc and beta blocker  Plan: blood pressure at goal. Continue to monitor    (I25.2) History of MI (myocardial infarction)  Comment: remote history of MI with stent placement. On ASA, beta blocker and statin  Plan: continue to monitor.    (F17.200) Tobacco dependence syndrome  Comment: advised on cessation  Plan: denies readiness to quit        Patient Instructions:  Patient Instructions   We can try to  help get your appointments scheduled.  Refill on medications.   Please schedule in a month with Pharmacist and PCP.   I ordered a nasal spray (Flonase) to try.  You can use your inhaler up to 4x per day.                 JUNIOR [facility] PRIMARY [facility]ARE      I spent Greater than 40 minutes was spent face to face with Kim Anne, with greater than 50 % in counselling and consultation about multiple complex issues as above,

## 2019-02-01 NOTE — PATIENT INSTRUCTIONS
We can try to help get your appointments scheduled.  Refill on medications.   Please schedule in a month with Pharmacist and PCP.   I ordered a nasal spray (Flonase) to try.  You can use your inhaler up to 4x per day.

## 2019-02-01 NOTE — PROGRESS NOTES
"[facility]   2019      Behavioral Health Clinician Progress Note    Patient Name: Kim Anne           Service Type: Individual      Service Location:   Face to Face in Clinic     Session Start Time: 01:00 pm  Session End Time: 01:50pm      Session Length: 38 - 52      Attendees: Patient, PCP and [facility] Intern    Visit Activities (Refresh list every visit): [facility] Only and [facility]    Diagnostic Assessment Date: TBD  Treatment Plan Review Date: TBD  See Flowsheets for today's PHQ-9 and JUSTINE-7 results  Previous PHQ-9:   PHQ-9 SCORE 2018 3/12/2018 2018   PHQ-9 Total Score - - -   PHQ-9 Total Score - - -   PHQ-9 Total Score 0 0 0     Previous JUSTINE-7:   JUSTINE-7 SCORE 2016 3/12/2018 2018   Total Score - - -   Total Score 0 0 0   Total Score - - -       NAE LEVEL:  NAE Score (Last Two) 2013   NAE Raw Score 48 45   Activation Score 80 73.1   NAE Level 4 4       DATA  Extended Session (60+ minutes): No  Interactive Complexity: No  Crisis: No    Treatment Objective(s) Addressed in This Session:  Target Behavior(s): disease management/lifestyle changes manage diabetes better    Grief / Loss: will process grief/loss issues in an adaptive manner  Psychological distress related to Diabetes    Current Stressors / Issues:  [facility] co-visit with patient and PCP in the clinic. Patient reports that her mood has been \"nervous\" because she has experienced some losses in her family. Patient shared that her niece recently had a massive heart attack and her another niece's  just  of a heart attack at a young age and is planning to attend the  [facility]. Expressed empathy and asked her how she's managing. Patient shared that she prays a lot and tells herself that she needs to just accept certain things and move on. Validated patient's strength and encouraged her to continue reaching out for support when she needs it. Patient shared that her " relatives are very supportive and she can tell them anything and they listen. Patient reports that her sleep has been normal and that her appetite has decreased over the past two weeks. PCP asked patient about her kidney appointments that she has missed and patient shared that with the stress of this week and the cold weather she was unable to follow through with these appointments. When writer met with patient individually, she explored ambivalence around beginning dialysis. Patient shared that her best friend who recently passed away was undergoing dialysis and she told patient that it was very painful and time consuming. Patient also shared that she doesn't want to lose time with her sisters who live [place] on the [facility] while attending dialysis. Patient knows the importance of following through with treatment for her kidneys, as it will prolong her life. Validated patient's feelings and expressed understanding about wanting to spend time with her sisters. Suggested to patient that she may be experiencing fear and resistance around beginning dialysis because of losing time with her family, and patient agreed that this may be the case. Patient will think about how the clinic can support her on an ongoing basis, and patient will think about it. Writer encouraged patient to schedule a follow-up therapy visit if she wants more support around this process and with all of her recent loss. Patient plans to meet with PCP and MTM for a follow-up appointment in one month.    Progress on Treatment Objective(s) / Homework:  Minimal progress - PREPARATION (Decided to change - considering how); Intervened by negotiating a change plan and determining options / strategies for behavior change, identifying triggers, exploring social supports, and working towards setting a date to begin behavior change    Motivational Interviewing    MI Intervention: Expressed Empathy/Understanding, Supported Autonomy,  Collaboration, Evocation, Open-ended questions and Reflections: simple and complex     Change Talk Expressed by the Patient: Committment to change Activation Taking steps    Provider Response to Change Talk: E - Evoked more info from patient about behavior change, A - Affirmed patient's thoughts, decisions, or attempts at behavior change, R - Reflected patient's change talk and S - Summarized patient's change talk statements      Care Plan review completed: No    Medication Review:  No changes to current psychiatric medication(s)    Medication Compliance:  Yes    Changes in Health Issues:   Yes: Pain, Associated Psychological Distress  Please see medical note.    Chemical Use Review:   Substance Use: Chemical use reviewed, no active concerns identified      Tobacco Use: Yes,  .  Client reports frequency of use 6 cigarettes daily. Reviewed information and resources for quitting  Reviewed options for assistance and intervention  Provided encouragement to quit      Assessment: Current Emotional / Mental Status (status of significant symptoms):  Risk status (Self / Other harm or suicidal ideation)  Patient denies a history of suicidal ideation, suicide attempts, self-injurious behavior, homicidal ideation, homicidal behavior and and other safety concerns  Patient denies current fears or concerns for personal safety.  Patient denies current or recent suicidal ideation or behaviors.  Patient denies current or recent homicidal ideation or behaviors.  Patient denies current or recent self injurious behavior or ideation.  Patient denies other safety concerns.  A safety and risk management plan has not been developed at this time, however patient was encouraged to call [facility] should there be a change in any of these risk factors.    Appearance:   Appropriate   Eye Contact:   Fair   Psychomotor Behavior: Normal   Attitude:   Guarded   Orientation:   All  Speech   Rate / Production: Normal    Volume:  Normal    Mood:    Anxious  Sad   Affect:    Appropriate   Thought Content:  Clear   Thought Form:  Logical   Insight:    Fair     Provisional Diagnosis:  1. Adjustment disorder with anxious mood        Collateral Reports Completed:  Not Applicable    Plan: (Homework, other):  Patient was given information about behavioral services and was encouraged to schedule a follow up appointment with the clinic [facility] as needed.  She was also given information about mental health symptoms and treatment options .  CD Recommendations: Maintain Sobriety.  Xochitl Ponce [facility] MSW Clinical Intern    Supervised/Reviewed:   LAMINE Bess, [facility]

## 2019-02-04 ENCOUNTER — CARE COORDINATION (OUTPATIENT)
Dept: NEPHROLOGY | Facility: CLINIC | Age: 74
End: 2019-02-04

## 2019-02-04 NOTE — PROGRESS NOTES
Patient no-showed vascular access consult with Dr. Allen and no-showed nephrology follow up appointment with Dr. Liz.     Message sent to clinic coordinators to help reschedule nephrology appointment. Can see any provider since patient used to see Dr. Lopes and she has not established care with anyone yet since his jail.     Ayush Oneal, RN, BAN  Nephrology RN Care Coordinator

## 2019-02-06 NOTE — PROGRESS NOTES
Per clinic coordinator: I called her and got a hold of a man at her home and he said he would have her call us to [facility].     She also left a message for the patient 2 days ago. Will close encounter and await call back from patient.    Ayush Oneal, RN, BAN  Nephrology RN Care Coordinator

## 2019-02-28 ENCOUNTER — TELEPHONE (OUTPATIENT)
Dept: FAMILY MEDICINE | Facility: CLINIC | Age: 74
End: 2019-02-28

## 2019-02-28 DIAGNOSIS — M54.50 CHRONIC LOW BACK PAIN WITHOUT SCIATICA, UNSPECIFIED BACK PAIN LATERALITY: ICD-10-CM

## 2019-02-28 DIAGNOSIS — G89.29 CHRONIC LOW BACK PAIN WITHOUT SCIATICA, UNSPECIFIED BACK PAIN LATERALITY: ICD-10-CM

## 2019-03-04 RX ORDER — OXYCODONE HYDROCHLORIDE 10 MG/1
10 TABLET ORAL EVERY 8 HOURS PRN
Qty: 90 TABLET | Refills: 0 | Status: SHIPPED | OUTPATIENT
Start: 2019-03-04 | End: 2019-04-03

## 2019-03-04 NOTE — TELEPHONE ENCOUNTER
Prescription filled for oxycodone with anticipation that she will keep the appointment with Mireya Baldwin on April 3rd, 2019.   Printed and given to the clinic nurses.  Jazmín Virk CNP, APNP  [facility]

## 2019-03-04 NOTE — TELEPHONE ENCOUNTER
Controlled Substance Refill Request for oxyCODONE IR (ROXICODONE) 10 MG tablet   Last refill: 2/3/19-- #90 for 30 day supply    Last clinic visit: 2/1/19   Next appt: 4/3/19      Controlled substance agreement on file: Yes:  Date 4/18/18.  Documentation in problem list reviewed:  Yes  Processing:  Patient will  in clinic    [place] reviewed:  reviewed- no concerns      Kallie Pritchard RN  03/04/19  8:46 AM

## 2019-03-04 NOTE — TELEPHONE ENCOUNTER
Pt called checking on the status of refill on Oxycodone.  Pt tel: [phone]      Bucky Weller

## 2019-03-05 ENCOUNTER — CARE COORDINATION (OUTPATIENT)
Dept: NEPHROLOGY | Facility: CLINIC | Age: 74
End: 2019-03-05

## 2019-03-08 NOTE — TELEPHONE ENCOUNTER
Another rx request came through for oxycodone. Per  patient picked up this med on 3/4/19.     Kallie Pritchard RN  03/08/19  4:02 PM

## 2019-03-20 ENCOUNTER — CARE COORDINATION (OUTPATIENT)
Dept: NEPHROLOGY | Facility: CLINIC | Age: 74
End: 2019-03-20

## 2019-03-20 NOTE — PROGRESS NOTES
Attempted to reach patient for follow up.   Could not leave VM for patient to return call, because box was full.    Violette Lugo, RN, BSN  Nephrology Care Coordinator  [facility] Physician Heart  [email or web address]  [phone]

## 2019-03-28 ENCOUNTER — TELEPHONE (OUTPATIENT)
Dept: TRANSPLANT | Facility: CLINIC | Age: 74
End: 2019-03-28

## 2019-03-28 ENCOUNTER — PATIENT OUTREACH (OUTPATIENT)
Dept: GERIATRIC MEDICINE | Facility: CLINIC | Age: 74
End: 2019-03-28

## 2019-03-28 NOTE — PROGRESS NOTES
[facility] Care Coordination Contact    Called member to complete six month assessment. Was unable to leave voicemail. Voicemail box full.  Left message with nephrology CC Rissa Cochran introducing self as CC with  [facility].  Batool Mai RN, BSN, PHN  [facility]  [phone]  Fax: [phone]\

## 2019-03-29 ENCOUNTER — PATIENT OUTREACH (OUTPATIENT)
Dept: GERIATRIC MEDICINE | Facility: CLINIC | Age: 74
End: 2019-03-29

## 2019-03-29 DIAGNOSIS — I10 ESSENTIAL HYPERTENSION, BENIGN: ICD-10-CM

## 2019-03-29 DIAGNOSIS — I25.2 HISTORY OF MI (MYOCARDIAL INFARCTION): ICD-10-CM

## 2019-03-29 NOTE — TELEPHONE ENCOUNTER
"Requested Prescriptions   Pending Prescriptions Disp Refills     ASPIR-LOW 81 MG EC tablet  Last Written Prescription Date:  2/27/18  Last Fill Quantity: 120,  # refills: 2   Last office visit: 2/1/2019 with prescribing provider:     Future Office Visit:   Next 5 appointments (look out 90 days)    Apr 03, 2019 11:00 AM CDT  Return Visit with JUNIOR Guzman CNP  [facility] ([facility]) [address] [place] [address]  [address] [place] [id number]  [phone]   Apr 03, 2019 11:00 AM CDT  Return Visit with Xochitl Ponce  [facility] ([facility]) [address] [place] [address]  [address] [place] [id number] [phone]   Apr 03, 2019 11:00 AM CDT  Office Visit with Carmen Elder [facility] ([facility]) [address] [place] [id number]  [phone]          120 tablet 2     Sig: Take 1 tablet (81 mg) by mouth daily    Analgesics (Non-Narcotic Tylenol and ASA Only) Passed - 3/29/2019  4:54 PM       Passed - Recent (12 mo) or future (30 days) visit within the authorizing provider's specialty    Patient had office visit in the last 12 months or has a visit in the next 30 days with authorizing provider or within the authorizing provider's specialty.  See \"Patient Info\" tab in inbasket, or \"Choose Columns\" in Meds & Orders section of the refill encounter.             Passed - Patient is age 20 years or older    If ASA is flagged for ages under 20 years old. Forward to provider for confirmation Ryes Syndrome is not a concern.             Passed - Medication is active on med list          "

## 2019-03-29 NOTE — PROGRESS NOTES
[facility] Care Coordination Contact    Arranged transportation thru [facility] for the below appt:  Appt Date & Time: 4/3/2019 at 11:00Am  Clinic Name & Address:  [address] [place] [address]  Transportation Provider: [employer]   time:  10:00-10:30AM    Notified member of  time.    Alisa Griffith  Care Management Specialist  [facility]  [phone]

## 2019-04-01 ENCOUNTER — PATIENT OUTREACH (OUTPATIENT)
Dept: GERIATRIC MEDICINE | Facility: CLINIC | Age: 74
End: 2019-04-01

## 2019-04-01 ENCOUNTER — TELEPHONE (OUTPATIENT)
Dept: TRANSPLANT | Facility: CLINIC | Age: 74
End: 2019-04-01

## 2019-04-01 RX ORDER — ASPIRIN 81 MG/1
81 TABLET ORAL DAILY
Qty: 120 TABLET | Refills: 2 | Status: SHIPPED | OUTPATIENT
Start: 2019-04-01 | End: 2019-04-03

## 2019-04-01 NOTE — TELEPHONE ENCOUNTER
Medication Refill Request    Medication(s) requested:  ASPIR-LOW 81 MG EC tablet    Last Office Visit: 2/1/19  Next Office Visit: 4/3/19  Labs: up to date     Rx approved and refilled per [facility] refill protocol.    Kallie Pritchard RN  04/01/19  8:14 AM

## 2019-04-01 NOTE — PROGRESS NOTES
[facility] Care Coordination Contact  Spoke to Rissa,[phone] nephrology CC who states member has been no shows for several recent appts they are having a very difficult time reaching her.  Discussed appropriateness of her continued candidacy given the frequency of no show/no call.   CM to continue to attempt to reach.   Batool Mai RN, BSN, PHN  [facility]  [phone]  Fax: [phone]

## 2019-04-01 NOTE — TELEPHONE ENCOUNTER
Batool called to discuss Kim.   She stated that she used to have a good working relationship with Kim, however she has cut her off since 2015.   Apparently there is some privacy and trust issues the Kim reports in that when visitors come into her home-she is reported to the state (child endangerment, ect).   Reported to me from the  CC=at the time in 2015, the house is not in anyway safe for a post operative patient to be living in. This was evidenced by the amount of unsanitary observations noted.   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   Other people in her house are: her son and his children.   Son has history of gang involvement, drug use and abuse.   Caller discussed Kim's ambivalence to dialysis-see note from pharmacist.   Caller has my name and number should she actually make contact with Kim.

## 2019-04-01 NOTE — TELEPHONE ENCOUNTER
Called case coordinator back. Left message with name and contact number.   Need her assistance with scheduling Kim and/or to discuss her continued candidacy given the frequency of no show/no calls.

## 2019-04-02 ENCOUNTER — TELEPHONE (OUTPATIENT)
Dept: TRANSPLANT | Facility: CLINIC | Age: 74
End: 2019-04-02

## 2019-04-02 ENCOUNTER — PATIENT OUTREACH (OUTPATIENT)
Dept: GERIATRIC MEDICINE | Facility: CLINIC | Age: 74
End: 2019-04-02

## 2019-04-02 NOTE — PROGRESS NOTES
"[facility] Care Coordination Contact      [facility] Six-Month Telephone Assessment    6 month telephone assessment completed on 19.    Telephone assessment was brief, member was difficult to engage in conversation. Shared concern with member that she has been missing appts and has been very difficult to reach. Memebr states her nephew  and she has been back and forth between visiting her family who lives north.  Provided member with Rissa CC telephone number and encouraged her to call to follow up.  Member declines home visit.     States everardo has been \"fine\" and has no concerns.     ER visits: No  Hospitalizations: No  TCU stays: No  Significant health status changes: denies  Falls/Injuries: No  ADL/IADL changes: No  Changes in services: No    Caregiver Assessment follow up:  n/a    Goals: See POC in chart for goal progress documentation.     Will see member in 6 months for an annual health risk assessment.   Encouraged member to call CC with any questions or concerns in the meantime.   Batool Mai RN, BSN, PHN  [facility]  [phone]  Fax: [phone]            "

## 2019-04-02 NOTE — TELEPHONE ENCOUNTER
"Kim called. She said she thought she had an appointment with us tomorrow. I reviewed that she is seeing a pharmacist for medication management.   I asked how she is feeling. She stated \"oh, alright\".   I asked if she is going to follow up with dialysis. She stated \"I am afraid of it!\"  I asked when is the last time you had labs and visited with your nephrologist. She said about 3 months or more.   I reviewed that her past nephrologist has retired, and that she should come in for updated labs and assessment. We can then decide, with her nephrologist, the best plan for her.   She is agreeable and wants someone to call her to make the appointment.   I will route to the clinic RN's.   "

## 2019-04-03 ENCOUNTER — OFFICE VISIT (OUTPATIENT)
Dept: PHARMACY | Facility: CLINIC | Age: 74
End: 2019-04-03
Payer: COMMERCIAL

## 2019-04-03 ENCOUNTER — OFFICE VISIT (OUTPATIENT)
Dept: FAMILY MEDICINE | Facility: CLINIC | Age: 74
End: 2019-04-03
Payer: COMMERCIAL

## 2019-04-03 ENCOUNTER — OFFICE VISIT (OUTPATIENT)
Dept: BEHAVIORAL HEALTH | Facility: CLINIC | Age: 74
End: 2019-04-03
Payer: COMMERCIAL

## 2019-04-03 VITALS
RESPIRATION RATE: 16 BRPM | HEIGHT: 65 IN | WEIGHT: 159.5 LBS | SYSTOLIC BLOOD PRESSURE: 138 MMHG | HEART RATE: 92 BPM | OXYGEN SATURATION: 97 % | TEMPERATURE: 97.4 F | DIASTOLIC BLOOD PRESSURE: 72 MMHG | BODY MASS INDEX: 26.57 KG/M2

## 2019-04-03 DIAGNOSIS — E78.5 HYPERLIPIDEMIA LDL GOAL <100: ICD-10-CM

## 2019-04-03 DIAGNOSIS — I25.2 HISTORY OF MI (MYOCARDIAL INFARCTION): ICD-10-CM

## 2019-04-03 DIAGNOSIS — N18.4 CHRONIC KIDNEY DISEASE, STAGE 4 (SEVERE) (H): ICD-10-CM

## 2019-04-03 DIAGNOSIS — E11.29 TYPE 2 DIABETES MELLITUS WITH MICROALBUMINURIA, WITH LONG-TERM CURRENT USE OF INSULIN (H): ICD-10-CM

## 2019-04-03 DIAGNOSIS — D50.9 IRON DEFICIENCY ANEMIA, UNSPECIFIED IRON DEFICIENCY ANEMIA TYPE: ICD-10-CM

## 2019-04-03 DIAGNOSIS — Z79.4 TYPE 2 DIABETES MELLITUS WITHOUT COMPLICATION, WITH LONG-TERM CURRENT USE OF INSULIN (H): ICD-10-CM

## 2019-04-03 DIAGNOSIS — E55.9 VITAMIN D DEFICIENCY DISEASE: ICD-10-CM

## 2019-04-03 DIAGNOSIS — E11.59 HYPERTENSION ASSOCIATED WITH DIABETES (H): ICD-10-CM

## 2019-04-03 DIAGNOSIS — F41.1 GAD (GENERALIZED ANXIETY DISORDER): Primary | ICD-10-CM

## 2019-04-03 DIAGNOSIS — N18.4 CKD (CHRONIC KIDNEY DISEASE) STAGE 4, GFR 15-29 ML/MIN (H): Primary | ICD-10-CM

## 2019-04-03 DIAGNOSIS — Z79.4 TYPE 2 DIABETES MELLITUS WITH MICROALBUMINURIA, WITH LONG-TERM CURRENT USE OF INSULIN (H): ICD-10-CM

## 2019-04-03 DIAGNOSIS — M54.50 CHRONIC LOW BACK PAIN WITHOUT SCIATICA, UNSPECIFIED BACK PAIN LATERALITY: ICD-10-CM

## 2019-04-03 DIAGNOSIS — I10 ESSENTIAL HYPERTENSION, BENIGN: Primary | ICD-10-CM

## 2019-04-03 DIAGNOSIS — J44.9 CHRONIC OBSTRUCTIVE PULMONARY DISEASE, UNSPECIFIED COPD TYPE (H): ICD-10-CM

## 2019-04-03 DIAGNOSIS — I15.2 HYPERTENSION ASSOCIATED WITH DIABETES (H): ICD-10-CM

## 2019-04-03 DIAGNOSIS — F17.210 CIGARETTE NICOTINE DEPENDENCE WITHOUT COMPLICATION: ICD-10-CM

## 2019-04-03 DIAGNOSIS — G89.29 CHRONIC LOW BACK PAIN WITHOUT SCIATICA, UNSPECIFIED BACK PAIN LATERALITY: ICD-10-CM

## 2019-04-03 DIAGNOSIS — I10 ESSENTIAL HYPERTENSION: ICD-10-CM

## 2019-04-03 DIAGNOSIS — E11.9 TYPE 2 DIABETES MELLITUS WITHOUT COMPLICATION, WITH LONG-TERM CURRENT USE OF INSULIN (H): ICD-10-CM

## 2019-04-03 DIAGNOSIS — R80.9 TYPE 2 DIABETES MELLITUS WITH MICROALBUMINURIA, WITH LONG-TERM CURRENT USE OF INSULIN (H): ICD-10-CM

## 2019-04-03 DIAGNOSIS — J43.9 PULMONARY EMPHYSEMA, UNSPECIFIED EMPHYSEMA TYPE (H): ICD-10-CM

## 2019-04-03 LAB
BASOPHILS # BLD AUTO: 0 10E9/L (ref 0–0.2)
BASOPHILS NFR BLD AUTO: 0.3 %
DIFFERENTIAL METHOD BLD: ABNORMAL
EOSINOPHIL # BLD AUTO: 0.4 10E9/L (ref 0–0.7)
EOSINOPHIL NFR BLD AUTO: 5.7 %
ERYTHROCYTE [DISTWIDTH] IN BLOOD BY AUTOMATED COUNT: 16.5 % (ref 10–15)
HCT VFR BLD AUTO: 34.5 % (ref 35–47)
HGB BLD-MCNC: 11.2 G/DL (ref 11.7–15.7)
LYMPHOCYTES # BLD AUTO: 1.3 10E9/L (ref 0.8–5.3)
LYMPHOCYTES NFR BLD AUTO: 17.6 %
MCH RBC QN AUTO: 28.6 PG (ref 26.5–33)
MCHC RBC AUTO-ENTMCNC: 32.5 G/DL (ref 31.5–36.5)
MCV RBC AUTO: 88 FL (ref 78–100)
MONOCYTES # BLD AUTO: 0.3 10E9/L (ref 0–1.3)
MONOCYTES NFR BLD AUTO: 4.4 %
NEUTROPHILS # BLD AUTO: 5.3 10E9/L (ref 1.6–8.3)
NEUTROPHILS NFR BLD AUTO: 72 %
PLATELET # BLD AUTO: 182 10E9/L (ref 150–450)
RBC # BLD AUTO: 3.92 10E12/L (ref 3.8–5.2)
WBC # BLD AUTO: 7.4 10E9/L (ref 4–11)

## 2019-04-03 PROCEDURE — 99607 MTMS BY PHARM ADDL 15 MIN: CPT | Performed by: PHARMACIST

## 2019-04-03 PROCEDURE — 85025 COMPLETE CBC W/AUTO DIFF WBC: CPT | Performed by: NURSE PRACTITIONER

## 2019-04-03 PROCEDURE — 80048 BASIC METABOLIC PNL TOTAL CA: CPT | Performed by: NURSE PRACTITIONER

## 2019-04-03 PROCEDURE — 36415 COLL VENOUS BLD VENIPUNCTURE: CPT | Performed by: NURSE PRACTITIONER

## 2019-04-03 PROCEDURE — 90834 PSYTX W PT 45 MINUTES: CPT

## 2019-04-03 PROCEDURE — 99215 OFFICE O/P EST HI 40 MIN: CPT | Performed by: NURSE PRACTITIONER

## 2019-04-03 PROCEDURE — 99605 MTMS BY PHARM NP 15 MIN: CPT | Performed by: PHARMACIST

## 2019-04-03 RX ORDER — OXYCODONE HYDROCHLORIDE 10 MG/1
10 TABLET ORAL EVERY 8 HOURS PRN
Qty: 90 TABLET | Refills: 0 | Status: SHIPPED | OUTPATIENT
Start: 2019-04-04 | End: 2019-05-06

## 2019-04-03 RX ORDER — ASPIRIN 81 MG/1
81 TABLET ORAL DAILY
Qty: 90 TABLET | Refills: 3 | Status: ON HOLD | OUTPATIENT
Start: 2019-04-03 | End: 2020-02-28

## 2019-04-03 RX ORDER — METOPROLOL SUCCINATE 25 MG/1
25 TABLET, EXTENDED RELEASE ORAL DAILY
Qty: 90 TABLET | Refills: 3 | Status: ON HOLD | OUTPATIENT
Start: 2019-04-03 | End: 2019-05-05

## 2019-04-03 ASSESSMENT — MIFFLIN-ST. JEOR: SCORE: 1229.37

## 2019-04-03 NOTE — PROGRESS NOTES
"SUBJECTIVE:                                                    Kim Anne is a 73 year old female with a hx of COPD, CKD stage 5, diabetes, hypertension, hx of AMI with stent placement, and chronic low back pain  who presents to clinic today for the following health issues:  [facility]: X[place]l      Patient presents for primary care follow up and medication management. Reports her nephew  yesterday due to MI and she plans to go to his  today. She reported another family member had  at our last visit. She states her feet are more swollen than normal, and attributes that to not elevating her legs overnight last night. She does wear her tyson hose during the day.    Patient still has not followed up with Nephrology. She has stage V renal failure and has been advised to start dialysis by numerous professionals. She acknowledges she is afraid to do so, and is afraid it will hurt. She states her friend told her she will \"die in the chair\" while getting dialysis. She is open to possibly touring a dialysis clinic to get her questions answered regarding what to expect. Aside from peripheral edema, she also endorses low urine output, pruritis and some nausea. Denies vomiting or worsening confusion.    Patient brings her medications for PharmD to review. Her granddaughter sets up her medications for her. PharmD notes multiple missing medications and mistakes with set up. See PharmD note from today. Discussed possibility of home health services or community paramedic. Patient declines at this time. Per Care coordination note from , patient terminated home care services in  due to being reported to the [facility] for child endangerment and unsanitary conditions. It also noted history of drug use in the home by her son/grandkids. Per social work, patient qualifies for multiple home services but is unable to get them due to refusing an in home assessment.       Medication errors noted in medication box:  -no lasix " in AM medicine  -no ASA or Metoprolol  -No ferrous sulfate supplement        Wt Readings from Last 3 Encounters:   04/03/19 72.3 kg (159 lb 8 oz)   02/01/19 72.1 kg (159 lb)   01/04/19 72.6 kg (160 lb)     Diabetes Follow-up      Patient is checking blood sugars: stopped checking 2 weeks ago due to blood sugars being stable in the 90s. Stopped her Lantus two weeks ago    Diabetic concerns: None     Symptoms of hypoglycemia (low blood sugar): none     Paresthesias (numbness or burning in feet) or sores: No     Date of last diabetic eye exam:  Eye Due     Diabetes Management Resources    Hyperlipidemia Follow-Up      Rate your low fat/cholesterol diet?: fair    Taking statin?  Yes, no muscle aches from statin    Other lipid medications/supplements?:  none    Hypertension Follow-up  -On Hydralazine 25 mg daily and Norvasc 10 mg daily  No metoprolol or Lasix in medication box.     Outpatient blood pressures Pt would like education on how to work Blood pressure cuff at home however she did not bring it in to today's visit.     Does not know how to work her cuff        Low Salt Diet: No added salt     BP Readings from Last 2 Encounters:   04/03/19 138/72   02/01/19 130/64     Hemoglobin A1C (%)   Date Value   01/04/2019 5.5   07/26/2018 5.8 (H)     LDL Cholesterol Calculated (mg/dL)   Date Value   10/19/2018 46   10/25/2017 104 (H)     Back Pain Follow Up       Description:   Back Pain - hx of degenerative disc disase  Lumbar XR 7/8/14 IMPRESSION: Multilevel advanced facet degenerative changes. Grade 1  degenerative spondylolisthesis at L4-L5. Multilevel mild degenerative  disc disease.  -slightly worse today, using a wheelchair as a walker, has a walker at home.   Location of pain:  center  Character of pain: dull ache and gnawing  Pain radiation: Does not radiate  Since last visit, pain is:  unchanged  New numbness or weakness in legs, not attributed to pain:  no     Intensity: moderate    History:   Pain interferes  with job: Not applicable,   Therapies tried without relief: rest  Therapies tried with relief: opioids              Accompanying Signs & Symptoms:  Risk of Fracture:  None  Risk of Cauda Equina:  None  Risk of Infection:  None  Risk of Cancer:  None  PHQ-9 SCORE 2/12/2018 3/12/2018 6/25/2018   PHQ-9 Total Score - - -   PHQ-9 Total Score - - -   PHQ-9 Total Score 0 0 0     JUSTINE-7 SCORE 12/12/2016 3/12/2018 6/25/2018   Total Score - - -   Total Score 0 0 0   Total Score - - -     Encounter-Level CSA - 04/10/2018:    Controlled Substance Agreement - Scan on 4/18/2018  3:13 PM: CONTROLLED SUBSTANCE AGREEMENT (below)       Encounter-Level CSA - 02/13/2017:    Controlled Substance Agreement - Scan on 2/16/2017  2:30 PM: CONTROLLED SUBSTANCE AGREEMENT (below)       Encounter-Level CSA - 01/11/2017:    Controlled Substance Agreement - Scan on 1/16/2017  6:55 PM: CONTROLLED SUBSTANCE AGREEMENT (below)       Encounter-Level CSA - 03/07/2016:    Controlled Substance Agreement - Scan on 3/8/2016  4:25 AM: CONTROLLED SUBSTANCE AGREEMENT 03/07/16 (below)       Encounter-Level CSA - 03/04/2015:    Controlled Substance Agreement - Scan on 3/5/2015  9:09 AM: Controlled Substance Agreement 03/04/15 (below)       Patient-Level CSA:    There are no patient-level csa.         Amount of exercise or physical activity:  Walking     Problems taking medications regularly: No    Medication side effects: none    Diet: Potassium diet         Mental Health Follow up -Depression     Status since last visit: reports stable.     Presents as depressed.        See PHQ-9 for current symptoms.  Other associated symptoms: None    Complicating factors: multiple deaths in the family  Significant life event:  No   Current substance abuse:  None  Anxiety or Panic symptoms:  No    Sleep - stable  Appetite - low  Exercise - denies    Smoking - 5 per day, considering cessation  Alcohol - denies  Street drugs - denies  Marijuana - denies  Caffeine -  denies    PHQ-9  English PHQ-9   Any Language         COPD Follow-Up    Symptoms are currently: same    Current fatigue or dyspnea with ambulation: stable     Shortness of breath: slightly worsened    Increased or change in Cough/Sputum: No    Fever(s): No    Using rescue inhaler BID. Also uses Spiriva daily.    Smokes up to 1/2 ppd     dulera II bid, spiriva     Any ER/UC or hospital admissions since your last visit? No     Vascular Disease Follow-up:  Peripheral Vascular Disease (PVD) and CAD       Chest pain or pressure, left side neck or arm pain: No    Shortness of breath/increased sweats/nausea with exertion: No    Pain in calves walking 1-2 blocks: No    Worsened or new symptoms since last visit: No    Nitroglycerin use: no    Daily aspirin use: Yes  Chronic Kidney Disease Follow-up      Current NSAID use?  No  -Na Bicarn 650 once daily, not BID as prescribed  -no longer taking Iron.   -see HPI above for more information.      Problems taking medications regularly: No    Medication side effects: none    Diet: limits K+    Social History     Tobacco Use     Smoking status: Current Every Day Smoker     Packs/day: 0.25     Years: 40.00     Pack years: 10.00     Types: Cigarettes     Last attempt to quit: 10/31/2016     Years since quittin.4     Smokeless tobacco: Never Used   Substance Use Topics     Alcohol use: No     Alcohol/week: 0.0 oz        Problem list and histories reviewed & adjusted, as indicated.  Additional history: as documented    Patient Active Problem List   Diagnosis     Hyperlipidemia LDL goal <100     ARAUZ (dyspnea on exertion)     COPD (chronic obstructive pulmonary disease) (H)     Edema     Fatigue     History of MI (myocardial infarction)     Snores     Knee pain, left     Bunion of great toe of left foot     Dystrophic nail     Arthritis     Peripheral vascular disease (H)     Chronic low back pain     Health Care Home     CKD (chronic kidney disease) stage 4, GFR 15-29 ml/min (H)      Abnormal gait     Advance Care Planning     Vitamin D deficiency     Constipation     Hypertension goal BP (blood pressure) < 130/80     Bradycardia     Callus of foot     Other chronic pain     Cataracts, bilateral     Hyperopic astigmatism of both eyes     Presbyopia     Asymptomatic bunion, right     Acquired hypothyroidism     Type 2 diabetes mellitus with diabetic chronic kidney disease (H)     Hypertension associated with diabetes (H)     Hyperlipidemia associated with type 2 diabetes mellitus (H)     Obesity (BMI 30-39.9)     History of sick sinus syndrome     Nephrotic syndrome     Pneumonia     Grief     Cigarette nicotine dependence without complication     HCOM with LVOT     Hyperkalemia     Type 2 diabetes, HbA1C goal < 8% (H)     Smoker     Single kidney     Hypothyroid     Hypertension goal BP (blood pressure) < 140/90     Hyperlipidemia     Diabetic nephropathy (H)     Hypoalbuminemia     Skin lesion of hand     ERRONEOUS ENCOUNTER--DISREGARD     JUSTINE (generalized anxiety disorder)     Past Surgical History:   Procedure Laterality Date     APPENDECTOMY       BLEPHAROPLASTY BILATERAL  2013    Procedure: BLEPHAROPLASTY BILATERAL;  BILATERAL UPPER EYELID BLEPHAROPLASTY ;  Surgeon: Olayinka Lyon MD;  Location: [facility]     CATARACT IOL, RT/LT Bilateral      CHOLECYSTECTOMY       COLONOSCOPY  7/15/2011    polyps repeat in 5 years     elected term pregnancy       HYSTEROSCOPIC PLACEMENT CONTRACEPTIVE DEVICE       KIDNEY SURGERY      donated left kideny     OVARY SURGERY      left for cyst benign     subclavian stent  2010    [facility]       Social History     Tobacco Use     Smoking status: Current Every Day Smoker     Packs/day: 0.25     Years: 40.00     Pack years: 10.00     Types: Cigarettes     Last attempt to quit: 10/31/2016     Years since quittin.4     Smokeless tobacco: Never Used   Substance Use Topics     Alcohol use: No     Alcohol/week: 0.0 oz     Family History    Problem Relation Age of Onset     Diabetes Mother         brother, MGM, sister     Kidney Disease Brother         X2 DM two      Alcohol/Drug Child         daughter     Asthma No family hx of      C.A.D. No family hx of      Hypertension No family hx of      Cerebrovascular Disease No family hx of      Breast Cancer No family hx of      Cancer - colorectal No family hx of      Prostate Cancer No family hx of      Allergies No family hx of      Alzheimer Disease No family hx of      Anesthesia Reaction No family hx of      Arthritis No family hx of      Blood Disease No family hx of      Cancer No family hx of      Cardiovascular No family hx of      Circulatory No family hx of      Congenital Anomalies No family hx of      Connective Tissue Disorder No family hx of      Depression No family hx of      Eye Disorder No family hx of      Genetic Disorder No family hx of      Gastrointestinal Disease No family hx of      Genitourinary Problems No family hx of      Gynecology No family hx of      Heart Disease No family hx of      Lipids No family hx of      Musculoskeletal Disorder No family hx of      Neurologic Disorder No family hx of      Obesity No family hx of      Osteoporosis No family hx of      Psychotic Disorder No family hx of      Respiratory No family hx of      Thyroid Disease No family hx of      Glaucoma No family hx of      Macular Degeneration No family hx of            Current Outpatient Medications   Medication Sig Dispense Refill     albuterol (PROAIR HFA/PROVENTIL HFA/VENTOLIN HFA) 108 (90 Base) MCG/ACT inhaler Inhale 2 puffs into the lungs every 6 hours as needed for shortness of breath / dyspnea or wheezing 18 g 3     Alcohol Swabs (SM ALCOHOL PREP) 70 % PADS Externally apply 1 pad topically 4 times daily 100 each 11     amLODIPine (NORVASC) 10 MG tablet Take 1 tablet (10 mg) by mouth daily 90 tablet 3     ASPIR-LOW 81 MG EC tablet Take 1 tablet (81 mg) by mouth daily 120 tablet 2      atorvastatin (LIPITOR) 40 MG tablet Take 1 tablet (40 mg) by mouth daily 90 tablet 1     blood glucose monitoring (FREESTYLE LITE) test strip TEST BLOOD SUGAR TWO TIMES A DAY 50 strip 12     blood glucose monitoring (FREESTYLE) lancets Test BS two times daily as directed 100 each 1     blood glucose monitoring (NO BRAND SPECIFIED) meter device kit Use to test blood sugar 2 times daily or as directed. Preferred blood glucose meter OR supplies to accompany: Blood Glucose Monitor Brands: per insurance. 1 kit 0     Blood Pressure Monitor KIT Automatic Blood Pressure Monitor 1 kit 0     Blood Pressure Monitoring (BLOOD PRESSURE CUFF) MISC 1 each 2 times daily 1 each 0     Cholecalciferol (VITAMIN D) 2000 units tablet Take 2,000 Units by mouth daily 90 tablet 3     docusate sodium (COLACE) 100 MG capsule Take 1 capsule (100 mg) by mouth 2 times daily 60 capsule 4     Emollient (EUCERIN CALMING DAILY MOIST) CREA Externally apply 1 dose. topically daily 1 Tube 12     ergocalciferol (ERGOCALCIFEROL) 11794 units capsule Take 1 capsule (50,000 Units) by mouth once a week 8 capsule 0     ferrous sulfate (IRON) 325 (65 Fe) MG tablet        fluticasone (FLONASE) 50 MCG/ACT nasal spray Spray 1 spray into both nostrils daily 9.9 mL 1     fluticasone (FLONASE) 50 MCG/ACT spray Spray 1-2 sprays into both nostrils daily 1 Bottle 1     furosemide (LASIX) 40 MG tablet Take 1.5 tablets (60 mg) by mouth daily 90 tablet 3     hydrALAZINE (APRESOLINE) 25 MG tablet Take 1 tablet (25 mg) by mouth 2 times daily 60 tablet 1     hydrALAZINE (APRESOLINE) 50 MG tablet        insulin glargine (LANTUS SOLOSTAR) 100 UNIT/ML pen Inject 8 Units Subcutaneous every morning 15 mL 3     insulin pen needle 31G X 6 MM Use as directed 120 each 3     levothyroxine (SYNTHROID/LEVOTHROID) 200 MCG tablet Take 1 tablet (200 mcg) by mouth daily 90 tablet 1     metoprolol succinate (TOPROL-XL) 25 MG 24 hr tablet Take 1 tablet (25 mg) by mouth daily 90 tablet 3      mometasone-formoterol (DULERA) 100-5 MCG/ACT inhaler Inhale 2 puffs into the lungs 2 times daily 1 Inhaler 1     nicotine polacrilex (COMMIT) 2 MG lozenge Dissolve 1 lozenge orally every 4 hours as directed. 100 lozenge 2     nitroGLYcerin (NITROSTAT) 0.4 MG sublingual tablet Place 1 tablet (0.4 mg) under the tongue every 5 minutes as needed for chest pain 25 tablet 0     nystatin (MYCOSTATIN) 497983 UNIT/GM POWD Apply 1 g topically 3 times daily as needed 30 g 1     order for DME Equipment being ordered: Diabetic Shoes 1 each 0     order for DME Equipment being ordered: two pairs moderate knee high support hose 2 Device 1     order for DME Equipment being ordered: Compression socks.  Strength:15-20 mmHg 2 each 0     order for DME One wheeled walker with seat and brakes and basket 1 Device 0     oxyCODONE IR (ROXICODONE) 10 MG tablet Take 1 tablet (10 mg) by mouth every 8 hours as needed for moderate to severe pain 90 tablet 0     polyethylene glycol (MIRALAX) powder Take 17 g (1 capful) by mouth daily as needed for constipation 510 g 2     sodium bicarbonate 325 MG tablet Take 2 tablets (650 mg) by mouth 2 times daily 360 tablet 3     sodium bicarbonate 650 MG tablet        TECHLITE PEN NEEDLES 32G X 4 MM        tiotropium (SPIRIVA HANDIHALER) 18 MCG inhaled capsule Inhale contents of one capsule daily. 90 capsule 3     Allergies   Allergen Reactions     Contrast Dye Hives and Itching     Clonidine      She had as IP and thinks it made her itchy     Diatrizoate Other (See Comments)     Diltiazem      Severe bradycardia     Iodine-131      Recent Labs   Lab Test 01/04/19  1426 11/16/18  0947 10/19/18  0939 08/03/18  0859 07/26/18  1116  01/16/18  1055  10/25/17  0639 10/20/17  1626  09/07/17  1135  11/02/16  0622   A1C 5.5  --   --   --  5.8*  --  6.0  --  6.6*  --   --  6.4*   < >  --    LDL  --   --  46  --   --   --   --   --  104*  --   --  81   < >  --    HDL  --   --  77  --   --   --   --   --  67  --   --   "75   < >  --    TRIG  --   --  188*  --   --   --   --   --  212*  --   --  283*   < >  --    ALT  --   --   --   --   --   --   --   --  16  --   --  18  --  21   CR  --  5.43*  --  3.88*  --    < > 3.34*   < > 2.77*  --    < > 2.70*   < > 2.64*   GFRESTIMATED  --  8*  --  11*  --    < > 14*   < > 17*  --    < > 17*   < > 18*   GFRESTBLACK  --  9*  --  14*  --    < > 16*   < > 20*  --    < > 21*   < > 22*   POTASSIUM  --  5.5*  --  4.9  --    < > 4.8   < > 4.3  --    < > 6.2*   < > 5.6*   TSH  --   --   --   --  1.12  --   --   --   --  1.04  --  71.77*   < >  --     < > = values in this interval not displayed.      BP Readings from Last 3 Encounters:   04/03/19 138/72   02/01/19 130/64   01/04/19 128/58    Wt Readings from Last 3 Encounters:   04/03/19 72.3 kg (159 lb 8 oz)   02/01/19 72.1 kg (159 lb)   01/04/19 72.6 kg (160 lb)        Labs reviewed in EPIC  Problem list, Medication list, Allergies, and Medical/Social/Surgical histories reviewed in [facility] and updated as appropriate.     ROS: Constitutional, neuro, ENT, endocrine, pulmonary, cardiac, gastrointestinal, genitourinary, musculoskeletal, integument and psychiatric systems are negative, except as otherwise noted above in the HPI.    OBJECTIVE:                                                    /72   Pulse 92   Temp 97.4  F (36.3  C) (Oral)   Resp 16   Ht 1.651 m (5' 5\")   Wt 72.3 kg (159 lb 8 oz)   SpO2 97%   BMI 26.54 kg/m    Body mass index is 26.54 kg/m .  GENERAL: healthy, alert, well nourished, well hydrated, no distress  EYES: Eyes grossly normal to inspection, extraocular movements - intact, and PERRL    RESP: lungs clear to auscultation - no rales, + scattered rhonchi throughout  CV: regular rates and rhythm, normal S1 S2, no S3 or S4 and no murmur, no click or rub - no homans or cords  Extremities: 2+ pitting edema bilateral feet. Cool to touch, faint pedal pulses, normal color and sensation.   ABDOMEN: soft, no tenderness, no  " hepatosplenomegaly, no masses, normal bowel sounds  MS: extremities- no gross deformities noted, no edema  SKIN: no suspicious lesions, no rashes  NEURO: strength and tone- normal, sensory exam- grossly normal, mentation- intact, speech- normal, reflexes- symmetric  Non focal no aphasia. No facial asymmetry.    Mental Status Assessment:  Appearance:   Appropriate  Bizarre   Eye Contact:   Fair   Psychomotor Behavior: Retarded (Slowed)   Attitude:   Guarded   Orientation:   All  Speech   Rate / Production: Slow    Volume:  Soft   Mood:    Depressed  Irritable   Affect:    Flat   Thought Content:  Clear   Thought Form:  Coherent  Goal Directed  Logical   Insight:    Poor    Diabetic Foot exam:  Microfilament exam done, no areas of concern, skin intact, nails thickened and trimmed, no calluses, corns, or open lesions, feet very edematous and cool to touch.    Attention Span and Concentration:  limited  Recent and Remote Memory:  intact  Fund of Knowledge: appropriate  Muscle Strength and Tone: normal         See [facility] notes     Diagnostic Test Results:  Results for orders placed or performed in visit on 04/03/19   CBC with platelets and differential   Result Value Ref Range    WBC 7.4 4.0 - 11.0 10e9/L    RBC Count 3.92 3.8 - 5.2 10e12/L    Hemoglobin 11.2 (L) 11.7 - 15.7 g/dL    Hematocrit 34.5 (L) 35.0 - 47.0 %    MCV 88 78 - 100 fl    MCH 28.6 26.5 - 33.0 pg    MCHC 32.5 31.5 - 36.5 g/dL    RDW 16.5 (H) 10.0 - 15.0 %    Platelet Count 182 150 - 450 10e9/L    % Neutrophils 72.0 %    % Lymphocytes 17.6 %    % Monocytes 4.4 %    % Eosinophils 5.7 %    % Basophils 0.3 %    Absolute Neutrophil 5.3 1.6 - 8.3 10e9/L    Absolute Lymphocytes 1.3 0.8 - 5.3 10e9/L    Absolute Monocytes 0.3 0.0 - 1.3 10e9/L    Absolute Eosinophils 0.4 0.0 - 0.7 10e9/L    Absolute Basophils 0.0 0.0 - 0.2 10e9/L    Diff Method Automated Method      *Note: Due to a large number of results and/or encounters for the requested time period, some results  have not been displayed. A complete set of results can be found in Results Review.        ASSESSMENT/PLAN:                                                    (N18.4) CKD (chronic kidney disease) stage 4, GFR 15-29 ml/min (H)  (primary encounter diagnosis)  Comment: end stage renal failure.  Plan: Basic metabolic panel  (Ca, Cl, CO2, Creat,         Gluc, K, Na, BUN), CBC with platelets and         differential        Update BMP and CBC today to get basic idea of progression, remaining renal labs per Nephrology.  -she is not compliant with follow up.  -Addressed her fears of starting dialysis and offered to set up tour of dialysis clinic to help address her concerns and hopefully dispell some fear. Patient was agreeable to that. We will reach out to clinic scheduler to assist.   -advised to resume Lasix and use her tyson hose for swelling.     (M54.5,  G89.29) Chronic low back pain without sciatica, unspecified back pain laterality  Comment: on chronic opioids. No concern for misuse, but question their long term appropriateness. Will do refill today, continue to monitor closely.  -advised on non-pharmacologic methods such as use of her walker. Patient minimally receptive.   Plan: oxyCODONE IR (ROXICODONE) 10 MG tablet            (I25.2) History of MI (myocardial infarction)  Comment: remote history with stent placement.   Plan: beta blocker and ASA not in pill box, PharmD advised to resume taking.  -continue statin.  -denies symptoms.     (E11.29,  R80.9,  Z79.4) Type 2 diabetes mellitus with microalbuminuria, with long-term current use of insulin (H)  Comment:pt not checking blood sugars at this time.   Plan: advised to resume checking 3-4 x per week and keep a log, will review at next appt and see if she qualifies to resume her insulin.     (I10) Essential hypertension  Comment: BP stable.  Plan: on Hydralazine and Norvasc. Beta blocker not in pill box, advised to resume.       (J43.9) Pulmonary emphysema, unspecified  emphysema type (H)  Comment: on Dulera and Spiriva  Plan: reviewed inhalers and difference between rescue and controller inhaler.  -advised on smoking cessation    (F17.210) Cigarette nicotine dependence without complication  Comment: considering cessation  Plan: encouragement given        Patient Instructions:  Patient Instructions     We can have our  help you get some appointments rescheduled.   Please bring your blood pressure cuff next time.  Please start checking blood sugar (finger test) every 1-2 days.   We will check lab today.  Start taking water pill (Lasix) in the morning.     Kim Anne   Home Medication Instructions GÓMEZ:    Printed on:04/03/19 115   Medication Information                      albuterol (PROAIR HFA/PROVENTIL HFA/VENTOLIN HFA) 108 (90 Base) MCG/ACT inhaler  Inhale 2 puffs into the lungs every 6 hours as needed for shortness of breath / dyspnea or wheezing             Alcohol Swabs (SM ALCOHOL PREP) 70 % PADS  Externally apply 1 pad topically 4 times daily             amLODIPine (NORVASC) 10 MG tablet  Take 1 tablet (10 mg) by mouth daily             ASPIR-LOW 81 MG EC tablet  Take 1 tablet (81 mg) by mouth daily             atorvastatin (LIPITOR) 40 MG tablet  Take 1 tablet (40 mg) by mouth daily             blood glucose monitoring (FREESTYLE LITE) test strip  TEST BLOOD SUGAR TWO TIMES A DAY             blood glucose monitoring (FREESTYLE) lancets  Test BS two times daily as directed             Blood Pressure Monitoring (BLOOD PRESSURE CUFF) MISC  1 each 2 times daily             Cholecalciferol (VITAMIN D) 2000 units tablet  Take 2,000 Units by mouth daily             docusate sodium (COLACE) 100 MG capsule  Take 1 capsule (100 mg) by mouth 2 times daily             Emollient (EUCERIN CALMING DAILY MOIST) CREA  Externally apply 1 dose. topically daily             fluticasone (FLONASE) 50 MCG/ACT nasal spray  Spray 1 spray into both nostrils daily              furosemide (LASIX) 40 MG tablet  Take 1.5 tablets (60 mg) by mouth daily             hydrALAZINE (APRESOLINE) 25 MG tablet  Take 1 tablet (25 mg) by mouth 2 times daily             levothyroxine (SYNTHROID/LEVOTHROID) 200 MCG tablet  Take 1 tablet (200 mcg) by mouth daily             metoprolol succinate ER (TOPROL-XL) 25 MG 24 hr tablet  Take 1 tablet (25 mg) by mouth daily             mometasone-formoterol (DULERA) 100-5 MCG/ACT inhaler  Inhale 2 puffs into the lungs 2 times daily             nicotine polacrilex (COMMIT) 2 MG lozenge  Dissolve 1 lozenge orally every 4 hours as directed.             nitroGLYcerin (NITROSTAT) 0.4 MG sublingual tablet  Place 1 tablet (0.4 mg) under the tongue every 5 minutes as needed for chest pain             nystatin (MYCOSTATIN) 124076 UNIT/GM POWD  Apply 1 g topically 3 times daily as needed             order for DME  One wheeled walker with seat and brakes and basket             order for DME  Equipment being ordered: Compression socks.  Strength:15-20 mmHg             order for DME  Equipment being ordered: Diabetic Shoes             order for DME  Equipment being ordered: two pairs moderate knee high support hose             oxyCODONE IR (ROXICODONE) 10 MG tablet  Take 1 tablet (10 mg) by mouth every 8 hours as needed for moderate to severe pain             polyethylene glycol (MIRALAX) powder  Take 17 g (1 capful) by mouth daily as needed for constipation             sodium bicarbonate 325 MG tablet  Take 2 tablets (650 mg) by mouth 2 times daily             tiotropium (SPIRIVA HANDIHALER) 18 MCG inhaled capsule  Inhale contents of one capsule daily.                               JUNIOR Alex [facility] PRIMARY CARE     I spent Greater than 40 minutes was spent face to face with Kim Anne, with greater than 50 % in counselling and consultation about multiple severe chronic medical conditions and social issues.

## 2019-04-03 NOTE — PROGRESS NOTES
"[facility] - [facility]   April 3, 2019      Behavioral Health Clinician Progress Note    Patient Name: Kim Anne           Service Type: Individual      Service Location:   Face to Face in Clinic     Session Start Time: 11:00 am  Session End Time: 11:45am      Session Length: 38 - 52      Attendees: Patient and PCP    Visit Activities (Refresh list every visit): [facility] [facility]    Diagnostic Assessment Date: TBD  Treatment Plan Review Date: TBD  See Flowsheets for today's PHQ-9 and JUSTINE-7 results  Previous PHQ-9:   PHQ-9 SCORE 2/12/2018 3/12/2018 6/25/2018   PHQ-9 Total Score - - -   PHQ-9 Total Score - - -   PHQ-9 Total Score 0 0 0     Previous JUSTINE-7:   JUSTINE-7 SCORE 12/12/2016 3/12/2018 6/25/2018   Total Score - - -   Total Score 0 0 0   Total Score - - -       NAE LEVEL:  NAE Score (Last Two) 2/18/2013 5/23/2014   NAE Raw Score 48 45   Activation Score 80 73.1   NAE Level 4 4       DATA  Extended Session (60+ minutes): No  Interactive Complexity: No  Crisis: No    Treatment Objective(s) Addressed in This Session:  Target Behavior(s): disease management/lifestyle changes manage diabetes better    Grief / Loss: will process grief/loss issues in an adaptive manner  Psychological distress related to Diabetes    Current Stressors / Issues:  [facility] co-visit with patient and PCP in the clinic. Patient reports that her mood has been \"okay\" and that her primary concern for the visit is her swollen feet and ankles and her back pain. Patient reported that she is fearful about going to dialysis and writer processed this fear with her and worked on normalizing the fear. MTM suggested the option to tour a dialysis facility and meet with a nurse to get information about what the process will look like. Patient said she would be interested in this and writer discussed other support systems she has in her life that may be able to attend the visit with her. She reports that she has someone who would be able to " "go with her and requested that another referral. Patient shared that her mood has been \"okay\" and that she been able to sleep through the night. She shared that one of her family members recently passed away from diabetes and a heart attack and she was planning on attending the  this weekend. Patient shared that her appetite has been okay. Writer offered [facility] only appointments to patient to work on processing the fear around dialysis and she said she is interested in this and requested to schedule an appointment in a month.    Progress on Treatment Objective(s) / Homework:  Minimal progress - PREPARATION (Decided to change - considering how); Intervened by negotiating a change plan and determining options / strategies for behavior change, identifying triggers, exploring social supports, and working towards setting a date to begin behavior change    Motivational Interviewing    MI Intervention: Expressed Empathy/Understanding, Supported Autonomy, Collaboration, Evocation, Open-ended questions and Reflections: simple and complex     Change Talk Expressed by the Patient: Committment to change Activation Taking steps    Provider Response to Change Talk: E - Evoked more info from patient about behavior change, A - Affirmed patient's thoughts, decisions, or attempts at behavior change, R - Reflected patient's change talk and S - Summarized patient's change talk statements      Care Plan review completed: No    Medication Review:  No changes to current psychiatric medication(s)    Medication Compliance:  Yes    Changes in Health Issues:   Yes: Pain, Associated Psychological Distress  Please see medical note.    Chemical Use Review:   Substance Use: Chemical use reviewed, no active concerns identified      Tobacco Use: Yes,  .  Client reports frequency of use 6 cigarettes daily. Reviewed information and resources for quitting  Reviewed options for assistance and intervention  Provided encouragement to quit  "     Assessment: Current Emotional / Mental Status (status of significant symptoms):  Risk status (Self / Other harm or suicidal ideation)  Patient denies a history of suicidal ideation, suicide attempts, self-injurious behavior, homicidal ideation, homicidal behavior and and other safety concerns  Patient denies current fears or concerns for personal safety.  Patient denies current or recent suicidal ideation or behaviors.  Patient denies current or recent homicidal ideation or behaviors.  Patient denies current or recent self injurious behavior or ideation.  Patient denies other safety concerns.  A safety and risk management plan has not been developed at this time, however patient was encouraged to call [place] [phone] should there be a change in any of these risk factors.    Appearance:   Appropriate   Eye Contact:   Fair   Psychomotor Behavior: Normal   Attitude:   Guarded   Orientation:   All  Speech   Rate / Production: Normal    Volume:  Normal   Mood:    Normal Sad   Affect:    Appropriate  Tearful   Thought Content:  Clear   Thought Form:  Logical   Insight:    Fair     Diagnoses:  1. JUSTINE (generalized anxiety disorder)        Collateral Reports Completed:  Not Applicable    Plan: (Homework, other):  Patient was given information about behavioral services and was encouraged to schedule a follow up appointment with the clinic [facility] as needed.  She was also given information about mental health symptoms and treatment options .  CD Recommendations: Maintain Sobriety.  Xochitl Ponce, [facility] MSW Clinical Intern    Supervised/Reviewed:   LAMINE Bess, [facility]

## 2019-04-03 NOTE — PATIENT INSTRUCTIONS
We can have our  help you get some appointments rescheduled.   Please bring your blood pressure cuff next time.  Please start checking blood sugar (finger test) every 1-2 days.   We will check lab today.  Start taking water pill (Lasix) in the morning.     Kim Anne   Home Medication Instructions GÓMEZ:    Printed on:04/03/19 6343   Medication Information                      albuterol (PROAIR HFA/PROVENTIL HFA/VENTOLIN HFA) 108 (90 Base) MCG/ACT inhaler  Inhale 2 puffs into the lungs every 6 hours as needed for shortness of breath / dyspnea or wheezing             Alcohol Swabs (SM ALCOHOL PREP) 70 % PADS  Externally apply 1 pad topically 4 times daily             amLODIPine (NORVASC) 10 MG tablet  Take 1 tablet (10 mg) by mouth daily             ASPIR-LOW 81 MG EC tablet  Take 1 tablet (81 mg) by mouth daily             atorvastatin (LIPITOR) 40 MG tablet  Take 1 tablet (40 mg) by mouth daily             blood glucose monitoring (FREESTYLE LITE) test strip  TEST BLOOD SUGAR TWO TIMES A DAY             blood glucose monitoring (FREESTYLE) lancets  Test BS two times daily as directed             Blood Pressure Monitoring (BLOOD PRESSURE CUFF) MISC  1 each 2 times daily             Cholecalciferol (VITAMIN D) 2000 units tablet  Take 2,000 Units by mouth daily             docusate sodium (COLACE) 100 MG capsule  Take 1 capsule (100 mg) by mouth 2 times daily             Emollient (EUCERIN CALMING DAILY MOIST) CREA  Externally apply 1 dose. topically daily             fluticasone (FLONASE) 50 MCG/ACT nasal spray  Spray 1 spray into both nostrils daily             furosemide (LASIX) 40 MG tablet  Take 1.5 tablets (60 mg) by mouth daily             hydrALAZINE (APRESOLINE) 25 MG tablet  Take 1 tablet (25 mg) by mouth 2 times daily             levothyroxine (SYNTHROID/LEVOTHROID) 200 MCG tablet  Take 1 tablet (200 mcg) by mouth daily             metoprolol succinate ER (TOPROL-XL) 25 MG 24 hr  tablet  Take 1 tablet (25 mg) by mouth daily             mometasone-formoterol (DULERA) 100-5 MCG/ACT inhaler  Inhale 2 puffs into the lungs 2 times daily             nicotine polacrilex (COMMIT) 2 MG lozenge  Dissolve 1 lozenge orally every 4 hours as directed.             nitroGLYcerin (NITROSTAT) 0.4 MG sublingual tablet  Place 1 tablet (0.4 mg) under the tongue every 5 minutes as needed for chest pain             nystatin (MYCOSTATIN) 975270 UNIT/GM POWD  Apply 1 g topically 3 times daily as needed             order for DME  One wheeled walker with seat and brakes and basket             order for DME  Equipment being ordered: Compression socks.  Strength:15-20 mmHg             order for DME  Equipment being ordered: Diabetic Shoes             order for DME  Equipment being ordered: two pairs moderate knee high support hose             oxyCODONE IR (ROXICODONE) 10 MG tablet  Take 1 tablet (10 mg) by mouth every 8 hours as needed for moderate to severe pain             polyethylene glycol (MIRALAX) powder  Take 17 g (1 capful) by mouth daily as needed for constipation             sodium bicarbonate 325 MG tablet  Take 2 tablets (650 mg) by mouth 2 times daily             tiotropium (SPIRIVA HANDIHALER) 18 MCG inhaled capsule  Inhale contents of one capsule daily.

## 2019-04-03 NOTE — PROGRESS NOTES
SUBJECTIVE/OBJECTIVE:                Kim Anne is a 73 year old female coming in for a follow-up visit for Medication Therapy Management.  She was referred to me from Ailyn Nieves. Seen as a covisit with Mireya Baldwin NP and Xochitl Mcfarlane [facility] intern.   This is a follow-up visit but the first visit of this calendar year.    Chief Complaint: Follow up from our visit on 8/16/18.    1. Both feet are swollen today  2. Back pain has been bothersome, using a clinic wheelchair today as a walker. She does have a walker at home, encouraged use. See PCP note for additional info.     Tobacco: 0-1 pack per day - is interested in quitting. Smoking 5cig/day and working with her friend to quit.   Alcohol: not currently using    Medication Adherence/Access:  Issues found and discussed below. Patient's granddaughter helps pt set up meds. Uses a weekly pill box. Brought in pill box and bottles today. Has only been taking AM medications and numerous mistakes noted in pill setup.   Current AM meds--hydralazine, amlodipine, vitamin D, sodium bicarb, atorvastatin, levothyroxine.     Diabetes:  Pt stopped using Lantus 2 weeks ago, didn't think she needed to use it and had been taking 4-6u daily previously. Pt is not experiencing side effects.  SMBG: none recently, last 2 weeks ago.  Ranges (pt reported): 90s  Patient is not experiencing hypoglycemia.  Recent symptoms of high blood sugar? none  Eye exam: Up to date  Foot exam: Up to date.  ACEi/ARB: No-due to history of hyperkalemia.   Urine Albumin:   Lab Results   Component Value Date    UMALCR 6325.80 (H) 07/07/2017    Aspirin: no aspirin in pill box or bottles  Diet/Exercise: reports difficulty adhering to renal diet and finding enough foods.   Lab Results   Component Value Date    A1C 5.5 01/04/2019    A1C 5.8 07/26/2018    A1C 6.0 01/16/2018    A1C 6.6 10/25/2017    A1C 6.4 09/07/2017       Hypertension: Current therapy includes hydralazine 25mg once daily (not BID as  "prescribed), amlodipine 10mg daily.  No metoprolol or furosemide set up in med box. Not self-monitoring BP, doesn't know how to use cuff. Patient reports no current medication side effects. Reports no dizziness or lightheadedness. Bilateral pitting edema in feet and lower legs, wearing compression stockings. \"forgot to put up my feet last night\".  See PCP note for additional details.     COPD: Current therapy includes Spiriva once daily in the evening, Dulera 2 puffs BID, albuterol BID.    Reports no SOB or cough.  Pt is not experiencing side effects.   Pt reports the following symptoms: none.  Pt does have an COPD Action Plan on file.     Vitamin D deficiency: Currently taking vitamin D 2000u daily without problems.   Vitamin D Deficiency Screening Results:  Lab Results   Component Value Date    VITDT 11 (L) 08/03/2018    VITDT 12 (L) 02/16/2018    VITDT 15 (L) 09/11/2017    VITDT 14 (L) 10/11/2016    VITDT 16 (L) 08/29/2016       CKD/anemia: currently taking sodium bicarb 650mg once a day (not BID as prescribed).  No longer taking iron, per previous discussion does not like GI side effects.  Symptoms: reduced urine output per pt report, occasional nausea and vomiting (~1x/wk, last night was the last occurrence).  Admits she has been scared to talk to nephrology about fistula and dialysis placement. Had been told by her friend on dialysis that \"you die in a chair\" and worried about her description.  She is overdue for a follow-up with nephrology but willing to schedule and interested in further discussion on dialysis, including tour of a facility to learn more.     Today's Vitals:   BP Readings from Last 1 Encounters:   04/03/19 138/72     Pulse Readings from Last 1 Encounters:   04/03/19 92     Wt Readings from Last 1 Encounters:   04/03/19 159 lb 8 oz (72.3 kg)     Ht Readings from Last 1 Encounters:   04/03/19 5' 5\" (1.651 m)     Estimated body mass index is 26.54 kg/m  as calculated from the following:    " "Height as of an earlier encounter on 4/3/19: 5' 5\" (1.651 m).    Weight as of an earlier encounter on 4/3/19: 159 lb 8 oz (72.3 kg).    Temp Readings from Last 1 Encounters:   04/03/19 97.4  F (36.3  C) (Oral)        ASSESSMENT:              Current medications were reviewed today as discussed above.      Medication Adherence: poor, needs improvement - see below. Updated med list provided to assist with med setup, see AVS from PCP visit.    Diabetes: Pt would benefit from restarting daily SMBG since stopping insulin to determine if she needs to restart medication.  Will hold off on restarting Lantus.  Recommended for her to take aspirin as prescribed, refilled.    Hypertension: BP at goal <140/90 and unclear which medications she has been taking.  Will continue her current prescribed regimen - reordered metoprolol and set up pill box for BID dosing of hydralazine. Pt to restart furosemide daily. Encouraged her to bring in BP cuff to follow-up for assistance with use.    COPD: stable. Education on controller vs reliever medications and recommend she decrease albuterol to PRN use.    Vit D deficiency: continue supplement and consider rechecking labs with nephrology.    CKD/anemia: needs improvement. Pt strongly encouraged to follow-up with nephrology. Will involve pt navigator to assist with scheduling. Education to increase Sodium bicarb to BID as prescribed     PLAN:                  1. Restart SMBG once daily  2. Restart metoprolol, furosemide, aspirin  3. Increase hydralazine and sodium bicarbonate to BID as prescribed  4. Use albuterol PRN instead of scheduled    I spent 60 minutes with this patient today. All changes were made via collaborative practice agreement with Mireya Baldwin. A copy of the visit note was provided to the patient's primary care provider.     Will follow up in 1 month.    The patient was given a summary of these recommendations as an after visit summary.    Carmen Elder, AnastasiiaD, BCACP   "

## 2019-04-04 ENCOUNTER — TELEPHONE (OUTPATIENT)
Dept: FAMILY MEDICINE | Facility: CLINIC | Age: 74
End: 2019-04-04

## 2019-04-04 LAB
ANION GAP SERPL CALCULATED.3IONS-SCNC: 10 MMOL/L (ref 3–14)
BUN SERPL-MCNC: 37 MG/DL (ref 7–30)
CALCIUM SERPL-MCNC: 7.5 MG/DL (ref 8.5–10.1)
CHLORIDE SERPL-SCNC: 119 MMOL/L (ref 94–109)
CO2 SERPL-SCNC: 15 MMOL/L (ref 20–32)
CREAT SERPL-MCNC: 5.38 MG/DL (ref 0.52–1.04)
GFR SERPL CREATININE-BSD FRML MDRD: 7 ML/MIN/{1.73_M2}
GLUCOSE SERPL-MCNC: 92 MG/DL (ref 70–99)
POTASSIUM SERPL-SCNC: 5.8 MMOL/L (ref 3.4–5.3)
SODIUM SERPL-SCNC: 144 MMOL/L (ref 133–144)

## 2019-04-05 ENCOUNTER — CARE COORDINATION (OUTPATIENT)
Dept: NEPHROLOGY | Facility: CLINIC | Age: 74
End: 2019-04-05

## 2019-04-05 NOTE — PROGRESS NOTES
Nephrology Note: Nursing Outreach Encounter    REASON FOR CALL:                                                      REASON FOR CALL: Care Coordination, Scheduling                                          SITUATION/BACKROUND:                                                    Patient is being treated for CKD Stage 5, htn.  Last office visit was with Dr. Lopes 11/16/18.  Multiple attempts by Nephrology team to follow up with patient in hopes to have her scheduled for an AV graft and to see a Nephrologist provider for f/u in general;  Enrolled in Kidney Smart class but has not attended yet.     ASSESSMENT:                                                      Unable to assess, as I have not been able to get in touch with patient.        PLAN:                                                      Follow Up:  In communication today with Kim oHward's Care Management Coordinator with [facility] #[phone].       With Elizabet's assistance we scheduled a follow up with Dr. Leal May 1, 2019 at 1430 with labs before hand.  We are also working on getting patient scheduled with a 1:1 Kidney Smart session hopefully on that same day, May 1st.  Call out and message left with Althea the Kidney Smart Coordinator to call back.     Violette Lugo, RN, BSN  Nephrology Care Coordinator  [facility] Physician Heart  [email or web address]  [phone]

## 2019-04-05 NOTE — TELEPHONE ENCOUNTER
Working with Nephrology to get pt in for a visit and have a 1:1 with RN to over kidney education.     Got pt nephrology apt for 5/1/19. And kidney RN will be calling me to help arrange for a 1:1 meeting with her that same day.     Will try to reach out to pt together and I will meet with her during her upcoming appt with PCP and remind her of nephrology appointment.    Elizabet Hester    Care Management Coordinator - [facility]  [facility]

## 2019-04-16 ENCOUNTER — TELEPHONE (OUTPATIENT)
Dept: FAMILY MEDICINE | Facility: CLINIC | Age: 74
End: 2019-04-16

## 2019-04-16 NOTE — PROGRESS NOTES
Call out to Elizabet Medrano, patient's RN Care Manager Coordinator with [facility] #[phone] to attempt to set up 1:1 Kidney smart class in conjuction with her apt with Dr. Leal 5/1/19. Left above information on self identified voicemail to call me back at [phone] or message me.     Will follow up with this tomorrow    Violette Lugo, RN, BSN  Nephrology Care Coordinator  [facility] Physician Heart  [email or web address]  [phone]

## 2019-04-17 NOTE — TELEPHONE ENCOUNTER
Spoke with the Nephrology nurse Violette #[phone], who gave Marcela's number for Kidney education. [phone]  Called and left a message asking for her to call me back to go over setting up a 1:1 visit to go over dialysis and other kidney health questions.    Will follow up in 2 days.    Elizabet Hester    Care Management Coordinator - [facility]  [facility]

## 2019-04-17 NOTE — PROGRESS NOTES
Received a call from patient's RN Care Manager Coordinator with [facility], Elizabet Medrano, in trying to coordinate a kidney smart class or 1:1 session. Patient see's Dr. Leal here at clinic at 1430 May 1st, 2019. It would be ideal to set up something that day as the patient has been noncompliant in past in showing up for appointments.     Nursing gave her Althea from Kidney Smart class number which is [phone]    Elizabet will call Althea and try and coordinate a time that works best for patient.    Nephrology care coordinator (NICOLE Oakes) will follow up in a couple days.    Violette Lugo, RN, BSN  Nephrology Care Coordinator  [facility] Physician Heart  [email or web address]  [phone]

## 2019-04-19 NOTE — TELEPHONE ENCOUNTER
Marcela called me back and is able to set up for a kidney smart nurse to meet with Kim before her apt on 5/1/19 in nephrology.  Reached out to Kim and left VM for her to call me back to discuss referral.   Kim is hard to get a hold of by phone and will be in clinic next week on Wednesday meeting with Xochitl. Plan to meet with Kim while in clinic and go over kidney appointment information with Marcela on the phone to discuss what the plan is with the kidney specialist.     Elizabet Hester    Care Management Coordinator - [facility]  [facility]

## 2019-04-24 ENCOUNTER — TELEPHONE (OUTPATIENT)
Dept: BEHAVIORAL HEALTH | Facility: CLINIC | Age: 74
End: 2019-04-24

## 2019-04-24 NOTE — TELEPHONE ENCOUNTER
Reached taqueria by phone and explained the kidney appointments on 5/1/19. Pt in agreeable and plans to attend appointment.    Elizabet Hester    Care Management Coordinator - [facility]  [facility]

## 2019-04-24 NOTE — TELEPHONE ENCOUNTER
Writer contacted patient to check-in about a no show with her PCP, Mireya, this afternoon and she said she forgot about this appointment and would like to reschedule and asked writer to assist her with this.

## 2019-04-24 NOTE — TELEPHONE ENCOUNTER
Pt no-showed to [facility] visit today.   Called and left message for pt to call back to inform her of kidney appointment.  Called Althea the kidney smart nurse notifying her that pt did not show to appointment.  Althea stated that she was able to set up for Kim to see a kidney nurse for her nephrology apt on 5/1/19. Kidney nurse Deb can meet pt at 1:30pm at same location as the nephrology.     Elizabet Hester    Care Management Coordinator - [facility]  [facility]

## 2019-04-26 ENCOUNTER — PATIENT OUTREACH (OUTPATIENT)
Dept: GERIATRIC MEDICINE | Facility: CLINIC | Age: 74
End: 2019-04-26

## 2019-04-26 NOTE — NURSING NOTE
Patient attended Kidney Smart class for RRT education. Flow sheet updated. Patient has follow up appointment with MATT Montiel on Friday 10/13, so will discuss if access referral should be made at that time. Patient is scheduled for Kidney Tx evaluation on 11/6.    Ayush Oneal RN    contraindicated

## 2019-04-26 NOTE — PROGRESS NOTES
[facility] Care Coordination Contact    Arranged transportation thru [facility] for the below appt:  Appt Date & Time: 5/1/19 at 1:30PM  Clinic Name & Address:  [facility]- [address] [place] [address]  Transportation Provider: Elissa Hands   time:  12:30-1:00PM    Notified member of  time.    Alisa Griffith  Care Management Specialist  [facility]  [phone]

## 2019-04-28 ENCOUNTER — TELEPHONE (OUTPATIENT)
Dept: FAMILY MEDICINE | Facility: CLINIC | Age: 74
End: 2019-04-28

## 2019-04-28 DIAGNOSIS — M54.50 CHRONIC LOW BACK PAIN WITHOUT SCIATICA, UNSPECIFIED BACK PAIN LATERALITY: ICD-10-CM

## 2019-04-28 DIAGNOSIS — G89.29 CHRONIC LOW BACK PAIN WITHOUT SCIATICA, UNSPECIFIED BACK PAIN LATERALITY: ICD-10-CM

## 2019-04-28 RX ORDER — OXYCODONE HYDROCHLORIDE 10 MG/1
10 TABLET ORAL EVERY 8 HOURS PRN
Qty: 90 TABLET | Refills: 0 | Status: CANCELLED | OUTPATIENT
Start: 2019-04-28

## 2019-04-29 NOTE — TELEPHONE ENCOUNTER
Refill request for Oxycodone is early, pt has appt 4/30 with PCP & can discuss this then. LM on pt VM.  Roxanne Leyva RN

## 2019-04-30 DIAGNOSIS — N18.4 CKD (CHRONIC KIDNEY DISEASE) STAGE 4, GFR 15-29 ML/MIN (H): Primary | ICD-10-CM

## 2019-05-01 ENCOUNTER — HOSPITAL ENCOUNTER (INPATIENT)
Facility: CLINIC | Age: 74
LOS: 4 days | Discharge: HOME | DRG: 640 | End: 2019-05-05
Attending: EMERGENCY MEDICINE | Admitting: INTERNAL MEDICINE
Payer: COMMERCIAL

## 2019-05-01 ENCOUNTER — ALLIED HEALTH/NURSE VISIT (OUTPATIENT)
Dept: TRANSPLANT | Facility: CLINIC | Age: 74
DRG: 640 | End: 2019-05-01
Attending: INTERNAL MEDICINE
Payer: COMMERCIAL

## 2019-05-01 ENCOUNTER — DOCUMENTATION ONLY (OUTPATIENT)
Dept: NEPHROLOGY | Facility: CLINIC | Age: 74
End: 2019-05-01

## 2019-05-01 ENCOUNTER — CARE COORDINATION (OUTPATIENT)
Dept: NEPHROLOGY | Facility: CLINIC | Age: 74
End: 2019-05-01

## 2019-05-01 ENCOUNTER — OFFICE VISIT (OUTPATIENT)
Dept: NEPHROLOGY | Facility: CLINIC | Age: 74
DRG: 640 | End: 2019-05-01
Attending: INTERNAL MEDICINE
Payer: COMMERCIAL

## 2019-05-01 VITALS
HEIGHT: 64 IN | TEMPERATURE: 97.7 F | DIASTOLIC BLOOD PRESSURE: 97 MMHG | HEART RATE: 79 BPM | SYSTOLIC BLOOD PRESSURE: 167 MMHG | WEIGHT: 168.3 LBS | BODY MASS INDEX: 28.73 KG/M2 | OXYGEN SATURATION: 98 %

## 2019-05-01 DIAGNOSIS — N18.4 CKD (CHRONIC KIDNEY DISEASE) STAGE 4, GFR 15-29 ML/MIN (H): Primary | ICD-10-CM

## 2019-05-01 DIAGNOSIS — E87.5 HYPERKALEMIA: ICD-10-CM

## 2019-05-01 DIAGNOSIS — N18.4 CKD (CHRONIC KIDNEY DISEASE) STAGE 4, GFR 15-29 ML/MIN (H): ICD-10-CM

## 2019-05-01 DIAGNOSIS — E11.22 TYPE 2 DIABETES MELLITUS WITH HYPERTENSION AND END STAGE RENAL DISEASE NOT ON DIALYSIS (H): ICD-10-CM

## 2019-05-01 DIAGNOSIS — Z79.4 ENCOUNTER FOR LONG-TERM (CURRENT) USE OF INSULIN (H): ICD-10-CM

## 2019-05-01 DIAGNOSIS — N18.6 END STAGE RENAL DISEASE (H): ICD-10-CM

## 2019-05-01 DIAGNOSIS — N18.5 CHRONIC KIDNEY DISEASE, STAGE 5, KIDNEY FAILURE (H): Primary | ICD-10-CM

## 2019-05-01 DIAGNOSIS — I12.0 TYPE 2 DIABETES MELLITUS WITH HYPERTENSION AND END STAGE RENAL DISEASE NOT ON DIALYSIS (H): ICD-10-CM

## 2019-05-01 DIAGNOSIS — N18.5 CHRONIC KIDNEY DISEASE, STAGE V (H): Primary | ICD-10-CM

## 2019-05-01 DIAGNOSIS — N18.6 TYPE 2 DIABETES MELLITUS WITH HYPERTENSION AND END STAGE RENAL DISEASE NOT ON DIALYSIS (H): ICD-10-CM

## 2019-05-01 LAB
ALBUMIN SERPL-MCNC: 1.2 G/DL (ref 3.4–5)
ALBUMIN SERPL-MCNC: 1.6 G/DL (ref 3.4–5)
ALBUMIN UR-MCNC: 100 MG/DL
ALP SERPL-CCNC: 77 U/L (ref 40–150)
ALT SERPL W P-5'-P-CCNC: 17 U/L (ref 0–50)
ANION GAP SERPL CALCULATED.3IONS-SCNC: 6 MMOL/L (ref 3–14)
APPEARANCE UR: CLEAR
AST SERPL W P-5'-P-CCNC: 21 U/L (ref 0–45)
BILIRUB DIRECT SERPL-MCNC: <0.1 MG/DL (ref 0–0.2)
BILIRUB SERPL-MCNC: 0.1 MG/DL (ref 0.2–1.3)
BILIRUB UR QL STRIP: NEGATIVE
BUN SERPL-MCNC: 44 MG/DL (ref 7–30)
CA-I BLD-SCNC: 4.2 MG/DL (ref 4.4–5.2)
CALCIUM SERPL-MCNC: 7.2 MG/DL (ref 8.5–10.1)
CHLORIDE SERPL-SCNC: 119 MMOL/L (ref 94–109)
CO2 BLDCOV-SCNC: 18 MMOL/L (ref 21–28)
CO2 SERPL-SCNC: 19 MMOL/L (ref 20–32)
COLOR UR AUTO: ABNORMAL
CREAT SERPL-MCNC: 4.8 MG/DL (ref 0.52–1.04)
CREAT UR-MCNC: 106 MG/DL
ERYTHROCYTE [DISTWIDTH] IN BLOOD BY AUTOMATED COUNT: 16.7 % (ref 10–15)
FERRITIN SERPL-MCNC: 286 NG/ML (ref 8–252)
GFR SERPL CREATININE-BSD FRML MDRD: 8 ML/MIN/{1.73_M2}
GLUCOSE BLD-MCNC: 73 MG/DL (ref 70–99)
GLUCOSE BLDC GLUCOMTR-MCNC: 143 MG/DL (ref 70–99)
GLUCOSE BLDC GLUCOMTR-MCNC: 152 MG/DL (ref 70–99)
GLUCOSE BLDC GLUCOMTR-MCNC: 72 MG/DL (ref 70–99)
GLUCOSE BLDC GLUCOMTR-MCNC: 99 MG/DL (ref 70–99)
GLUCOSE SERPL-MCNC: 82 MG/DL (ref 70–99)
GLUCOSE UR STRIP-MCNC: NEGATIVE MG/DL
HCT VFR BLD AUTO: 33 % (ref 35–47)
HCT VFR BLD CALC: 31 %PCV (ref 35–47)
HGB BLD CALC-MCNC: 10.5 G/DL (ref 11.7–15.7)
HGB BLD-MCNC: 10 G/DL (ref 11.7–15.7)
HGB UR QL STRIP: NEGATIVE
INR PPP: 0.95 (ref 0.86–1.14)
IRON SATN MFR SERPL: 34 % (ref 15–46)
IRON SERPL-MCNC: 48 UG/DL (ref 35–180)
KETONES UR STRIP-MCNC: NEGATIVE MG/DL
LEUKOCYTE ESTERASE UR QL STRIP: NEGATIVE
MAGNESIUM SERPL-MCNC: 1.5 MG/DL (ref 1.6–2.3)
MCH RBC QN AUTO: 28.5 PG (ref 26.5–33)
MCHC RBC AUTO-ENTMCNC: 30.3 G/DL (ref 31.5–36.5)
MCV RBC AUTO: 94 FL (ref 78–100)
MUCOUS THREADS #/AREA URNS LPF: PRESENT /LPF
NITRATE UR QL: NEGATIVE
PCO2 BLDV: 42 MM HG (ref 40–50)
PH BLDV: 7.24 PH (ref 7.32–7.43)
PH UR STRIP: 6.5 PH (ref 5–7)
PHOSPHATE SERPL-MCNC: 3.1 MG/DL (ref 2.5–4.5)
PLATELET # BLD AUTO: 216 10E9/L (ref 150–450)
PO2 BLDV: 22 MM HG (ref 25–47)
POTASSIUM BLD-SCNC: 6.5 MMOL/L (ref 3.4–5.3)
POTASSIUM SERPL-SCNC: 6.2 MMOL/L (ref 3.4–5.3)
POTASSIUM SERPL-SCNC: 6.2 MMOL/L (ref 3.4–5.3)
POTASSIUM SERPL-SCNC: 6.4 MMOL/L (ref 3.4–5.3)
POTASSIUM SERPL-SCNC: 6.6 MMOL/L (ref 3.4–5.3)
PROT SERPL-MCNC: 4.5 G/DL (ref 6.8–8.8)
PROT UR-MCNC: 9.37 G/L
PROT/CREAT 24H UR: 8.84 G/G CR (ref 0–0.2)
PTH-INTACT SERPL-MCNC: 692 PG/ML (ref 18–80)
RBC # BLD AUTO: 3.51 10E12/L (ref 3.8–5.2)
RBC #/AREA URNS AUTO: <1 /HPF (ref 0–2)
SAO2 % BLDV FROM PO2: 29 %
SODIUM BLD-SCNC: 141 MMOL/L (ref 133–144)
SODIUM SERPL-SCNC: 143 MMOL/L (ref 133–144)
SOURCE: ABNORMAL
SP GR UR STRIP: 1.01 (ref 1–1.03)
SQUAMOUS #/AREA URNS AUTO: <1 /HPF (ref 0–1)
TIBC SERPL-MCNC: 140 UG/DL (ref 240–430)
UROBILINOGEN UR STRIP-MCNC: NORMAL MG/DL (ref 0–2)
WBC # BLD AUTO: 7 10E9/L (ref 4–11)
WBC #/AREA URNS AUTO: <1 /HPF (ref 0–5)

## 2019-05-01 PROCEDURE — 96365 THER/PROPH/DIAG IV INF INIT: CPT | Performed by: EMERGENCY MEDICINE

## 2019-05-01 PROCEDURE — 99207 ZZC APP CREDIT; MD BILLING SHARED VISIT: CPT | Performed by: PHYSICIAN ASSISTANT

## 2019-05-01 PROCEDURE — 25000125 ZZHC RX 250: Performed by: EMERGENCY MEDICINE

## 2019-05-01 PROCEDURE — 99285 EMERGENCY DEPT VISIT HI MDM: CPT | Mod: 25 | Performed by: EMERGENCY MEDICINE

## 2019-05-01 PROCEDURE — 83735 ASSAY OF MAGNESIUM: CPT | Performed by: EMERGENCY MEDICINE

## 2019-05-01 PROCEDURE — 36415 COLL VENOUS BLD VENIPUNCTURE: CPT | Performed by: EMERGENCY MEDICINE

## 2019-05-01 PROCEDURE — 83540 ASSAY OF IRON: CPT | Performed by: INTERNAL MEDICINE

## 2019-05-01 PROCEDURE — 80076 HEPATIC FUNCTION PANEL: CPT | Performed by: EMERGENCY MEDICINE

## 2019-05-01 PROCEDURE — 40000498 ZZHCL STATISTIC POTASSIUM ED POCT

## 2019-05-01 PROCEDURE — 12000001 ZZH R&B MED SURG/OB UMMC

## 2019-05-01 PROCEDURE — 83550 IRON BINDING TEST: CPT | Performed by: INTERNAL MEDICINE

## 2019-05-01 PROCEDURE — 93010 ELECTROCARDIOGRAM REPORT: CPT | Performed by: INTERNAL MEDICINE

## 2019-05-01 PROCEDURE — 25800025 ZZH RX 258: Performed by: PHYSICIAN ASSISTANT

## 2019-05-01 PROCEDURE — 93005 ELECTROCARDIOGRAM TRACING: CPT | Performed by: EMERGENCY MEDICINE

## 2019-05-01 PROCEDURE — 82728 ASSAY OF FERRITIN: CPT | Performed by: INTERNAL MEDICINE

## 2019-05-01 PROCEDURE — 82306 VITAMIN D 25 HYDROXY: CPT | Performed by: INTERNAL MEDICINE

## 2019-05-01 PROCEDURE — 40000497 ZZHCL STATISTIC SODIUM ED POCT

## 2019-05-01 PROCEDURE — 25000132 ZZH RX MED GY IP 250 OP 250 PS 637: Performed by: PHYSICIAN ASSISTANT

## 2019-05-01 PROCEDURE — 96375 TX/PRO/DX INJ NEW DRUG ADDON: CPT | Performed by: EMERGENCY MEDICINE

## 2019-05-01 PROCEDURE — 80069 RENAL FUNCTION PANEL: CPT | Performed by: INTERNAL MEDICINE

## 2019-05-01 PROCEDURE — 96361 HYDRATE IV INFUSION ADD-ON: CPT | Performed by: EMERGENCY MEDICINE

## 2019-05-01 PROCEDURE — 99222 1ST HOSP IP/OBS MODERATE 55: CPT | Performed by: INTERNAL MEDICINE

## 2019-05-01 PROCEDURE — 96376 TX/PRO/DX INJ SAME DRUG ADON: CPT | Performed by: EMERGENCY MEDICINE

## 2019-05-01 PROCEDURE — G0463 HOSPITAL OUTPT CLINIC VISIT: HCPCS | Mod: ZF

## 2019-05-01 PROCEDURE — 99291 CRITICAL CARE FIRST HOUR: CPT | Mod: 25 | Performed by: EMERGENCY MEDICINE

## 2019-05-01 PROCEDURE — 83970 ASSAY OF PARATHORMONE: CPT | Performed by: INTERNAL MEDICINE

## 2019-05-01 PROCEDURE — 96367 TX/PROPH/DG ADDL SEQ IV INF: CPT | Performed by: EMERGENCY MEDICINE

## 2019-05-01 PROCEDURE — 81001 URINALYSIS AUTO W/SCOPE: CPT | Performed by: EMERGENCY MEDICINE

## 2019-05-01 PROCEDURE — 93010 ELECTROCARDIOGRAM REPORT: CPT | Mod: Z6 | Performed by: EMERGENCY MEDICINE

## 2019-05-01 PROCEDURE — 84156 ASSAY OF PROTEIN URINE: CPT | Performed by: INTERNAL MEDICINE

## 2019-05-01 PROCEDURE — 84132 ASSAY OF SERUM POTASSIUM: CPT | Performed by: EMERGENCY MEDICINE

## 2019-05-01 PROCEDURE — 40000501 ZZHCL STATISTIC HEMATOCRIT ED POCT

## 2019-05-01 PROCEDURE — 82330 ASSAY OF CALCIUM: CPT

## 2019-05-01 PROCEDURE — 25000132 ZZH RX MED GY IP 250 OP 250 PS 637: Performed by: EMERGENCY MEDICINE

## 2019-05-01 PROCEDURE — 00000146 ZZHCL STATISTIC GLUCOSE BY METER IP

## 2019-05-01 PROCEDURE — 82803 BLOOD GASES ANY COMBINATION: CPT

## 2019-05-01 PROCEDURE — 96366 THER/PROPH/DIAG IV INF ADDON: CPT | Performed by: EMERGENCY MEDICINE

## 2019-05-01 PROCEDURE — 85610 PROTHROMBIN TIME: CPT | Performed by: EMERGENCY MEDICINE

## 2019-05-01 PROCEDURE — 25000128 H RX IP 250 OP 636: Performed by: EMERGENCY MEDICINE

## 2019-05-01 PROCEDURE — 40000502 ZZHCL STATISTIC GLUCOSE ED POCT

## 2019-05-01 PROCEDURE — 85027 COMPLETE CBC AUTOMATED: CPT | Performed by: INTERNAL MEDICINE

## 2019-05-01 PROCEDURE — 25800025 ZZH RX 258: Performed by: EMERGENCY MEDICINE

## 2019-05-01 PROCEDURE — 94640 AIRWAY INHALATION TREATMENT: CPT | Performed by: EMERGENCY MEDICINE

## 2019-05-01 PROCEDURE — 83036 HEMOGLOBIN GLYCOSYLATED A1C: CPT | Performed by: EMERGENCY MEDICINE

## 2019-05-01 PROCEDURE — 36415 COLL VENOUS BLD VENIPUNCTURE: CPT | Performed by: INTERNAL MEDICINE

## 2019-05-01 PROCEDURE — 84132 ASSAY OF SERUM POTASSIUM: CPT | Performed by: PHYSICIAN ASSISTANT

## 2019-05-01 RX ORDER — DEXTROSE MONOHYDRATE 100 MG/ML
INJECTION, SOLUTION INTRAVENOUS CONTINUOUS
Status: DISCONTINUED | OUTPATIENT
Start: 2019-05-01 | End: 2019-05-01

## 2019-05-01 RX ORDER — DEXTROSE MONOHYDRATE 25 G/50ML
25-50 INJECTION, SOLUTION INTRAVENOUS
Status: DISCONTINUED | OUTPATIENT
Start: 2019-05-01 | End: 2019-05-02

## 2019-05-01 RX ORDER — ALBUTEROL SULFATE 0.83 MG/ML
2.5 SOLUTION RESPIRATORY (INHALATION) ONCE
Status: DISCONTINUED | OUTPATIENT
Start: 2019-05-01 | End: 2019-05-01

## 2019-05-01 RX ORDER — DEXTROSE MONOHYDRATE 25 G/50ML
25 INJECTION, SOLUTION INTRAVENOUS ONCE
Status: COMPLETED | OUTPATIENT
Start: 2019-05-01 | End: 2019-05-02

## 2019-05-01 RX ORDER — CALCIUM GLUCONATE 94 MG/ML
1 INJECTION, SOLUTION INTRAVENOUS ONCE
Status: COMPLETED | OUTPATIENT
Start: 2019-05-01 | End: 2019-05-01

## 2019-05-01 RX ORDER — DEXTROSE MONOHYDRATE 25 G/50ML
25 INJECTION, SOLUTION INTRAVENOUS ONCE
Status: COMPLETED | OUTPATIENT
Start: 2019-05-01 | End: 2019-05-01

## 2019-05-01 RX ORDER — NICOTINE POLACRILEX 4 MG
15-30 LOZENGE BUCCAL
Status: DISCONTINUED | OUTPATIENT
Start: 2019-05-01 | End: 2019-05-02

## 2019-05-01 RX ORDER — SODIUM POLYSTYRENE SULFONATE 15 G/60ML
30 SUSPENSION ORAL; RECTAL ONCE
Status: COMPLETED | OUTPATIENT
Start: 2019-05-01 | End: 2019-05-02

## 2019-05-01 RX ORDER — DEXTROSE MONOHYDRATE 100 MG/ML
INJECTION, SOLUTION INTRAVENOUS CONTINUOUS
Status: ACTIVE | OUTPATIENT
Start: 2019-05-01 | End: 2019-05-02

## 2019-05-01 RX ORDER — ALBUTEROL SULFATE 0.83 MG/ML
2.5 SOLUTION RESPIRATORY (INHALATION) ONCE
Status: COMPLETED | OUTPATIENT
Start: 2019-05-01 | End: 2019-05-01

## 2019-05-01 RX ORDER — DEXTROSE MONOHYDRATE 100 MG/ML
INJECTION, SOLUTION INTRAVENOUS CONTINUOUS
Status: DISCONTINUED | OUTPATIENT
Start: 2019-05-01 | End: 2019-05-02

## 2019-05-01 RX ORDER — FUROSEMIDE 10 MG/ML
100 INJECTION INTRAMUSCULAR; INTRAVENOUS ONCE
Status: COMPLETED | OUTPATIENT
Start: 2019-05-01 | End: 2019-05-01

## 2019-05-01 RX ORDER — MAGNESIUM SULFATE 1 G/100ML
2 INJECTION INTRAVENOUS ONCE
Status: DISCONTINUED | OUTPATIENT
Start: 2019-05-01 | End: 2019-05-01

## 2019-05-01 RX ORDER — FUROSEMIDE 10 MG/ML
100 INJECTION INTRAMUSCULAR; INTRAVENOUS ONCE
Status: DISCONTINUED | OUTPATIENT
Start: 2019-05-01 | End: 2019-05-02

## 2019-05-01 RX ADMIN — FUROSEMIDE 100 MG: 10 INJECTION, SOLUTION INTRAVENOUS at 19:21

## 2019-05-01 RX ADMIN — DEXTROSE MONOHYDRATE: 100 INJECTION, SOLUTION INTRAVENOUS at 18:24

## 2019-05-01 RX ADMIN — ALBUTEROL SULFATE 2.5 MG: 2.5 SOLUTION RESPIRATORY (INHALATION) at 18:09

## 2019-05-01 RX ADMIN — HUMAN INSULIN 8 UNITS: 100 INJECTION, SOLUTION SUBCUTANEOUS at 18:05

## 2019-05-01 RX ADMIN — DEXTROSE MONOHYDRATE 25 G: 25 INJECTION, SOLUTION INTRAVENOUS at 18:02

## 2019-05-01 RX ADMIN — HUMAN INSULIN 8 UNITS: 100 INJECTION, SOLUTION SUBCUTANEOUS at 21:22

## 2019-05-01 RX ADMIN — SODIUM BICARBONATE 50 MEQ: 84 INJECTION, SOLUTION INTRAVENOUS at 18:07

## 2019-05-01 RX ADMIN — CALCIUM GLUCONATE 1 G: 98 INJECTION, SOLUTION INTRAVENOUS at 18:07

## 2019-05-01 RX ADMIN — Medication 2 G: at 22:16

## 2019-05-01 RX ADMIN — DEXTROSE MONOHYDRATE 25 G: 25 INJECTION, SOLUTION INTRAVENOUS at 21:25

## 2019-05-01 RX ADMIN — DEXTROSE MONOHYDRATE: 100 INJECTION, SOLUTION INTRAVENOUS at 21:29

## 2019-05-01 ASSESSMENT — MIFFLIN-ST. JEOR: SCORE: 1253.4

## 2019-05-01 ASSESSMENT — ENCOUNTER SYMPTOMS
PALPITATIONS: 1
FATIGUE: 1
NAUSEA: 1
VOMITING: 1
ROS SKIN COMMENTS: POSITIVE FOR GENERALIZED ITCHING
SHORTNESS OF BREATH: 1

## 2019-05-01 ASSESSMENT — PAIN SCALES - GENERAL: PAINLEVEL: MODERATE PAIN (4)
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
## 2019-05-01 NOTE — PROGRESS NOTES
The pt came into the clinic and has a potassium of 6.6. Pt denies any CP, SOB, dizziness, or any pain. 12 lead shows a sinus rhythm with no ST changes. Vitals WNL. A&Ox4.    Pt denies all current complaints.      Pt is being transferred to the ED via  for the potassium level.

## 2019-05-01 NOTE — LETTER
5/1/2019      RE: Kim Anne  [address] [facility] [address]       Assessment :     # Stage V CKD:  Secondary to biopsy proven  diabetic nephropathy (Patient had native kidney biopsy in Feb of 2015) in settings of solitary right kidney after donating to brother in 1988. Today laboratory work up showed serum BUN of 47 mg/dl, serum creatinine of 4.8 mg/dl and correlating eGFR  8 ml/min. UPCR is 8.8 g/gCr. Patient had UPCR of 10.45 g/gCr in 11/2018    # Hypertension: patient's blood pressure is elevated at 167/87 mmHg. On amlodipine 10 mg daily, toprolol XL 25 mg daily , hydralazine 25 mg BID, Lasix 60 mg (patient dose not take it every day). Patient appears hypervolemic on exam given facial, upper and lower extremity edema. Patient gained 9 lbs in last 4 weeks.She is breathing comfortable on room air.    # Anemia in chronic renal disease:   - Hgb: Stable, 10 g/dl  - Iron studies: Replete    # Electrolytes:   - Potassium; level: High 6.6. ECG showed NSR, no acute changes ( no evidence of arrhythmia, conduction abnormalities). Patient is asymptomatic  - Bicarbonate; level: Low, 19. On PO sodium bicarbonate 650 mg BID    # Mineral Bone Disorder:   - Secondary renal hyperparathyroidism; PTH level was 486 pg/ml in 08/2018. PTH from today is pending  - Vitamin D; level is: Low in 08/18. Repeat vit D level from today is pending. On cholecalciferol 2000U daily  - Corrected for hypoalbuminemia serum calcium; level is:  Normal 9.1  - Phosphorus; level is: Normal 3.1    # Type II DM-diet controlled. Patient's recent HgbA1c was 5.5% in 01/2019.    Plan:  Rapid response team was called.   Patient will be transported by ambulance to the ED at [facility]. Case was discussed over-the-phone with ED physician   Recommend to give IV Calcium gluconate 2 g, sodium bicarbonate 50 mEQ, albuterol nebs, dextrose 25 g followed by insulin 10 units, IV lasix 100 mg. Repeat serum potassium in 2 hours after above is  completed and shift if still elevated.  Patient should be monitored by continuous telemetry.   Check VBG  The patient needs initiation of hemodialysis given presence of moderate hyperkalemia, signs and symptoms of uremia. Risks and benefits of hemodialysis were discussed in length with the patient.The patient decided to proceed with hemodialysis.   I discussed patient's case with IR. Patient is scheduled for tunneled catheter placement tomorrow. Admitting team needs to place an inpatient consult after the patient's is admitted to the hospital. Patient needs to be NPO after midnight. INR was added to patient's labs.  Plan is to initiate hemodialysis after tunneled catheter placement tomorrow.  Restart PTA Amlodipine, Toprol XL and Hydralazine    Assessment and plan was discussed with patient and she voiced her understanding and agreement    Patient was staffed with Dr.Manske Gabi Leal MD  Nephrology Fellow  [facility]  Department of Medicine  Division of Renal Disease and Hypertension  [phone]      Reason for Visit:  Kim Anne is a 73 year old female with CKD from diabetes, who presents for routine follow up. Patient was last seen in 2018.    HPI:  This is a 73 year old female with type II DM, solitary right kidney after donating to brother in , HTN, obesity and stage V CKD with nephrotic range proteinuria  who presents for follow up. Patient was last seen by  in 2018 and 2 months follow up was recommended. However the patient did not show for her follow-up appointments. Since last seen has had no medical problems and no changes in her medications.  Patient reports progressive fatigue,  appetite, shortness of breath with exertion and increased upper and lower extremity edema. She admits generalized skin pruritis and muscle twitching in upper and lower extremities. Patient admits chronic cough with clear sputum for last 2 years. Patient states that  her urinary output decreased in last 6 months. Patient states that she urinates small amounts every 2 hours. Patient states that she had few instances in last few months when she suddenly dropped objects from her hands for unclear reason.    Patient denies: fever, chills, weight loss, dizziness, adenopathy, sore throat, rhinorrhea,chest pain, palpitations, orthopnea, nausea, vomiting, abdominal pain, changes in bowel habits, dysuria, urinary urgency, hematuria, rash, muscle weakness.    Home BP: Not checked    ROS:  A comprehensive review of systems was obtained and negative, except as noted in the HPI or PMH.    Active Medical Problems:  Patient Active Problem List   Diagnosis     Hyperlipidemia LDL goal <100     ARAUZ (dyspnea on exertion)     COPD (chronic obstructive pulmonary disease) (H)     Edema     Fatigue     History of MI (myocardial infarction)     Snores     Knee pain, left     Bunion of great toe of left foot     Dystrophic nail     Arthritis     Peripheral vascular disease (H)     Chronic low back pain     Health Care Home     CKD (chronic kidney disease) stage 4, GFR 15-29 ml/min (H)     Abnormal gait     Advance Care Planning     Vitamin D deficiency     Constipation     Hypertension goal BP (blood pressure) < 130/80     Bradycardia     Callus of foot     Other chronic pain     Cataracts, bilateral     Hyperopic astigmatism of both eyes     Presbyopia     Asymptomatic bunion, right     Acquired hypothyroidism     Type 2 diabetes mellitus with diabetic chronic kidney disease (H)     Hypertension associated with diabetes (H)     Hyperlipidemia associated with type 2 diabetes mellitus (H)     Obesity (BMI 30-39.9)     History of sick sinus syndrome     Nephrotic syndrome     Pneumonia     Grief     Cigarette nicotine dependence without complication     HCOM with LVOT     Hyperkalemia     Type 2 diabetes, HbA1C goal < 8% (H)     Smoker     Single kidney     Hypothyroid     Hypertension goal BP (blood  pressure) < 140/90     Hyperlipidemia     Diabetic nephropathy (H)     Hypoalbuminemia     Skin lesion of hand     ERRONEOUS ENCOUNTER--DISREGARD     JUSTINE (generalized anxiety disorder)       Personal Hx:   Social History     Tobacco Use     Smoking status: Current Every Day Smoker     Packs/day: 0.25     Years: 40.00     Pack years: 10.00     Types: Cigarettes     Last attempt to quit: 10/31/2016     Years since quittin.4     Smokeless tobacco: Never Used   Substance Use Topics     Alcohol use: No     Alcohol/week: 0.0 oz       Allergies: Reviewed by provider.     Medications:  Current Outpatient Medications   Medication Sig     albuterol (PROAIR HFA/PROVENTIL HFA/VENTOLIN HFA) 108 (90 Base) MCG/ACT inhaler Inhale 2 puffs into the lungs every 6 hours as needed for shortness of breath / dyspnea or wheezing     Alcohol Swabs (SM ALCOHOL PREP) 70 % PADS Externally apply 1 pad topically 4 times daily     amLODIPine (NORVASC) 10 MG tablet Take 1 tablet (10 mg) by mouth daily     ASPIR-LOW 81 MG EC tablet Take 1 tablet (81 mg) by mouth daily     atorvastatin (LIPITOR) 40 MG tablet Take 1 tablet (40 mg) by mouth daily     blood glucose monitoring (FREESTYLE LITE) test strip TEST BLOOD SUGAR TWO TIMES A DAY     blood glucose monitoring (FREESTYLE) lancets Test BS two times daily as directed     Blood Pressure Monitoring (BLOOD PRESSURE CUFF) MISC 1 each 2 times daily     Cholecalciferol (VITAMIN D) 2000 units tablet Take 2,000 Units by mouth daily     docusate sodium (COLACE) 100 MG capsule Take 1 capsule (100 mg) by mouth 2 times daily     Emollient (EUCERIN CALMING DAILY MOIST) CREA Externally apply 1 dose. topically daily     fluticasone (FLONASE) 50 MCG/ACT nasal spray Spray 1 spray into both nostrils daily     furosemide (LASIX) 40 MG tablet Take 1.5 tablets (60 mg) by mouth daily     hydrALAZINE (APRESOLINE) 25 MG tablet Take 1 tablet (25 mg) by mouth 2 times daily     levothyroxine (SYNTHROID/LEVOTHROID) 200 MCG  "tablet Take 1 tablet (200 mcg) by mouth daily     metoprolol succinate ER (TOPROL-XL) 25 MG 24 hr tablet Take 1 tablet (25 mg) by mouth daily     mometasone-formoterol (DULERA) 100-5 MCG/ACT inhaler Inhale 2 puffs into the lungs 2 times daily     nicotine polacrilex (COMMIT) 2 MG lozenge Dissolve 1 lozenge orally every 4 hours as directed.     nitroGLYcerin (NITROSTAT) 0.4 MG sublingual tablet Place 1 tablet (0.4 mg) under the tongue every 5 minutes as needed for chest pain     nystatin (MYCOSTATIN) 393319 UNIT/GM POWD Apply 1 g topically 3 times daily as needed     order for DME Equipment being ordered: Diabetic Shoes     order for DME Equipment being ordered: two pairs moderate knee high support hose     order for DME Equipment being ordered: Compression socks.  Strength:15-20 mmHg     order for DME One wheeled walker with seat and brakes and basket     oxyCODONE IR (ROXICODONE) 10 MG tablet Take 1 tablet (10 mg) by mouth every 8 hours as needed for moderate to severe pain     polyethylene glycol (MIRALAX) powder Take 17 g (1 capful) by mouth daily as needed for constipation     sodium bicarbonate 325 MG tablet Take 2 tablets (650 mg) by mouth 2 times daily     tiotropium (SPIRIVA HANDIHALER) 18 MCG inhaled capsule Inhale contents of one capsule daily.     Current Facility-Administered Medications   Medication     HOLD MEDICATION       Vitals:  BP (!) 167/97   Pulse 79   Temp 97.7  F (36.5  C) (Oral)   Ht 1.626 m (5' 4\")   Wt 76.3 kg (168 lb 4.8 oz)   SpO2 98%   BMI 28.89 kg/m       Exam:  GENERAL APPEARANCE: chronically ill looking, alert and no distress  HENT:periorbital edema, mouth without ulcers or lesions  LYMPHATICS: no cervical or supraclavicular nodes  RESP:elft lung clear to auscultation in anterior and posterior fields, expiratory wheezes in the right posterior fields  CV: regular rhythm, normal rate, no rub, no murmur  EDEMA: pitting edema of the upper extremities and 2+ pretibial pitting LE " edema bilaterally  ABDOMEN: soft, nondistended, nontender, bowel sounds normal  MS: extremities normal - no gross deformities noted, no evidence of inflammation in joints, no muscle tenderness  SKIN: no rash  Neuro: awake, alert, oriented in time , place , situation and herself, +asterexis     LABS:   CMP  Recent Labs   Lab Test 04/03/19  1202 11/16/18  0947 08/03/18  0859 05/16/18  1030    142 144 142   POTASSIUM 5.8* 5.5* 4.9 5.3   CHLORIDE 119* 116* 116* 115*   CO2 15* 17* 19* 19*   ANIONGAP 10 9 9 8   GLC 92 85 94 103*   BUN 37* 42* 37* 37*   CR 5.38* 5.43* 3.88* 3.86*   GFRESTIMATED 7* 8* 11* 11*   GFRESTBLACK 8* 9* 14* 14*   FRANCES 7.5* 7.1* 7.7* 8.2*     Recent Labs   Lab Test 10/25/17  0639 09/07/17  1135 11/02/16  0622 11/01/16  2335   BILITOTAL 0.2 0.2 0.3 0.4   ALKPHOS 164* 158* 228* 265*   ALT 16 18 21 27   AST 13 19 17 23     CBC  Recent Labs   Lab Test 04/03/19  1202 11/16/18  0947 08/03/18  0859 05/16/18  1030   HGB 11.2* 9.9* 11.9 12.3   WBC 7.4 5.8 7.7 8.4   RBC 3.92 3.44* 4.20 4.32   HCT 34.5* 32.5* 39.1 40.4   MCV 88 95 93 94   MCH 28.6 28.8 28.3 28.5   MCHC 32.5 30.5* 30.4* 30.4*   RDW 16.5* 15.5* 13.9 15.9*    259 305 300     URINE STUDIES  Recent Labs   Lab Test 05/16/18  1030 10/25/17  0640 09/11/17  0952 11/02/16  2235 10/11/16  0844 08/29/16  1652   COLOR Straw Yellow Yellow Yellow Yellow Yellow   APPEARANCE Clear Slightly Cloudy Clear Clear Clear Clear   URINEGLC 150* 150* 100* 150* 100* 100*   URINEBILI Negative Negative Negative Negative Negative Negative   URINEKETONE Negative Negative Negative Negative Negative Negative   SG 1.011 1.019 1.020 1.011 1.025 1.020   UBLD Negative Negative Small* Trace* Trace* Trace*   URINEPH 7.0 6.0 7.0 6.5 7.0 7.0   PROTEIN >499* >499* >=300* 300* >=300* >=300*   UROBILINOGEN  --   --  0.2  --  0.2 0.2   NITRITE Negative Negative Negative Negative Negative Negative   LEUKEST Negative Negative Negative Negative Negative Negative   RBCU 1 1 O - 2  <1 O - 2 O - 2   WBCU 1 3* O - 2 1 O - 2 O - 2     Recent Labs   Lab Test 11/16/18  0907 10/19/18  1528 05/16/18  1030 02/16/18  0950   UTPG 10.45* 13.23* 16.14* 11.34*     PTH  Recent Labs   Lab Test 08/03/18  0859 02/16/18  0945 09/11/17  0928   PTHI 486* 536* 363*     IRON STUDIES  Recent Labs   Lab Test 02/16/18  0945 09/11/17  0928 01/11/17  0946   IRON 46 73 35   * 170* 193*   IRONSAT 28 43 18    127 105       Gabi Leal MD    I have seen and examined this patient with the fellow.  This note reflects our joint assessment and plan.     Makenzie Ortiz MD
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
## 2019-05-01 NOTE — LETTER
5/1/2019       RE: Kim Anne  [address] [facility] [id number]     Dear Colleague,    Thank you for referring your patient, Kim Anne, to the [facility] NEPHROLOGY at [facility]. Please see a copy of my visit note below.    Assessment :     # Stage V CKD:  Secondary to biopsy proven  diabetic nephropathy (Patient had native kidney biopsy in Feb of 2015) in settings of solitary right kidney after donating to brother in 1988. Today laboratory work up showed serum BUN of 47 mg/dl, serum creatinine of 4.8 mg/dl and correlating eGFR  8 ml/min. UPCR is 8.8 g/gCr. Patient had UPCR of 10.45 g/gCr in 11/2018    # Hypertension: patient's blood pressure is elevated at 167/87 mmHg. On amlodipine 10 mg daily, toprolol XL 25 mg daily , hydralazine 25 mg BID, Lasix 60 mg (patient dose not take it every day). Patient appears hypervolemic on exam given facial, upper and lower extremity edema. Patient gained 9 lbs in last 4 weeks.She is breathing comfortable on room air.    # Anemia in chronic renal disease:   - Hgb: Stable, 10 g/dl  - Iron studies: Replete    # Electrolytes:   - Potassium; level: High 6.6. ECG showed NSR, no acute changes ( no evidence of arrhythmia, conduction abnormalities). Patient is asymptomatic  - Bicarbonate; level: Low, 19. On PO sodium bicarbonate 650 mg BID    # Mineral Bone Disorder:   - Secondary renal hyperparathyroidism; PTH level was 486 pg/ml in 08/2018. PTH from today is pending  - Vitamin D; level is: Low in 08/18. Repeat vit D level from today is pending. On cholecalciferol 2000U daily  - Corrected for hypoalbuminemia serum calcium; level is:  Normal 9.1  - Phosphorus; level is: Normal 3.1    # Type II DM-diet controlled. Patient's recent HgbA1c was 5.5% in 01/2019.    Plan:  Rapid response team was called.   Patient will be transported by ambulance to [facility] ED [facility]. Case was discussed over-the-phone with ED physician    Recommend to give IV Calcium gluconate 2 g, sodium bicarbonate 50 mEQ, albuterol nebs, dextrose 25 g followed by insulin 10 units, IV lasix 100 mg. Repeat serum potassium in 2 hours after above is completed and shift if still elevated.  Patient should be monitored by continuous telemetry.   Check VBG  The patient needs initiation of hemodialysis given presence of moderate hyperkalemia, signs and symptoms of uremia. Risks and benefits of hemodialysis were discussed in length with the patient.The patient decided to proceed with hemodialysis.   I discussed patient's case with IR. Patient is scheduled for tunneled catheter placement tomorrow. Admitting team needs to place an inpatient consult after the patient's is admitted to the hospital. Patient needs to be NPO after midnight. INR was added to patient's labs.  Plan is to initiate hemodialysis after tunneled catheter placement tomorrow.  Restart PTA Amlodipine, Toprol XL and Hydralazine    Assessment and plan was discussed with patient and she voiced her understanding and agreement    Patient was staffed with Dr.Manske Gabi Leal MD  Nephrology Fellow  [facility]  Department of Medicine  Division of Renal Disease and Hypertension  [phone]      Reason for Visit:  Kim Anne is a 73 year old female with CKD from diabetes, who presents for routine follow up. Patient was last seen in 2018.    HPI:  This is a 73 year old female with type II DM, solitary right kidney after donating to brother in , HTN, obesity and stage V CKD with nephrotic range proteinuria  who presents for follow up. Patient was last seen by  in 2018 and 2 months follow up was recommended. However the patient did not show for her follow-up appointments. Since last seen has had no medical problems and no changes in her medications.  Patient reports progressive fatigue,  appetite, shortness of breath with exertion and increased  upper and lower extremity edema. She admits generalized skin pruritis and muscle twitching in upper and lower extremities. Patient admits chronic cough with clear sputum for last 2 years. Patient states that her urinary output decreased in last 6 months. Patient states that she urinates small amounts every 2 hours. Patient states that she had few instances in last few months when she suddenly dropped objects from her hands for unclear reason.    Patient denies: fever, chills, weight loss, dizziness, adenopathy, sore throat, rhinorrhea,chest pain, palpitations, orthopnea, nausea, vomiting, abdominal pain, changes in bowel habits, dysuria, urinary urgency, hematuria, rash, muscle weakness.    Home BP: Not checked    ROS:  A comprehensive review of systems was obtained and negative, except as noted in the HPI or PMH.    Active Medical Problems:  Patient Active Problem List   Diagnosis     Hyperlipidemia LDL goal <100     ARAUZ (dyspnea on exertion)     COPD (chronic obstructive pulmonary disease) (H)     Edema     Fatigue     History of MI (myocardial infarction)     Snores     Knee pain, left     Bunion of great toe of left foot     Dystrophic nail     Arthritis     Peripheral vascular disease (H)     Chronic low back pain     Health Care Home     CKD (chronic kidney disease) stage 4, GFR 15-29 ml/min (H)     Abnormal gait     Advance Care Planning     Vitamin D deficiency     Constipation     Hypertension goal BP (blood pressure) < 130/80     Bradycardia     Callus of foot     Other chronic pain     Cataracts, bilateral     Hyperopic astigmatism of both eyes     Presbyopia     Asymptomatic bunion, right     Acquired hypothyroidism     Type 2 diabetes mellitus with diabetic chronic kidney disease (H)     Hypertension associated with diabetes (H)     Hyperlipidemia associated with type 2 diabetes mellitus (H)     Obesity (BMI 30-39.9)     History of sick sinus syndrome     Nephrotic syndrome     Pneumonia     Grief      Cigarette nicotine dependence without complication     HCOM with LVOT     Hyperkalemia     Type 2 diabetes, HbA1C goal < 8% (H)     Smoker     Single kidney     Hypothyroid     Hypertension goal BP (blood pressure) < 140/90     Hyperlipidemia     Diabetic nephropathy (H)     Hypoalbuminemia     Skin lesion of hand     ERRONEOUS ENCOUNTER--DISREGARD     JUSTINE (generalized anxiety disorder)       Personal Hx:   Social History     Tobacco Use     Smoking status: Current Every Day Smoker     Packs/day: 0.25     Years: 40.00     Pack years: 10.00     Types: Cigarettes     Last attempt to quit: 10/31/2016     Years since quittin.4     Smokeless tobacco: Never Used   Substance Use Topics     Alcohol use: No     Alcohol/week: 0.0 oz       Allergies: Reviewed by provider.     Medications:  Current Outpatient Medications   Medication Sig     albuterol (PROAIR HFA/PROVENTIL HFA/VENTOLIN HFA) 108 (90 Base) MCG/ACT inhaler Inhale 2 puffs into the lungs every 6 hours as needed for shortness of breath / dyspnea or wheezing     Alcohol Swabs (SM ALCOHOL PREP) 70 % PADS Externally apply 1 pad topically 4 times daily     amLODIPine (NORVASC) 10 MG tablet Take 1 tablet (10 mg) by mouth daily     ASPIR-LOW 81 MG EC tablet Take 1 tablet (81 mg) by mouth daily     atorvastatin (LIPITOR) 40 MG tablet Take 1 tablet (40 mg) by mouth daily     blood glucose monitoring (FREESTYLE LITE) test strip TEST BLOOD SUGAR TWO TIMES A DAY     blood glucose monitoring (FREESTYLE) lancets Test BS two times daily as directed     Blood Pressure Monitoring (BLOOD PRESSURE CUFF) MISC 1 each 2 times daily     Cholecalciferol (VITAMIN D) 2000 units tablet Take 2,000 Units by mouth daily     docusate sodium (COLACE) 100 MG capsule Take 1 capsule (100 mg) by mouth 2 times daily     Emollient (EUCERIN CALMING DAILY MOIST) CREA Externally apply 1 dose. topically daily     fluticasone (FLONASE) 50 MCG/ACT nasal spray Spray 1 spray into both nostrils daily      "furosemide (LASIX) 40 MG tablet Take 1.5 tablets (60 mg) by mouth daily     hydrALAZINE (APRESOLINE) 25 MG tablet Take 1 tablet (25 mg) by mouth 2 times daily     levothyroxine (SYNTHROID/LEVOTHROID) 200 MCG tablet Take 1 tablet (200 mcg) by mouth daily     metoprolol succinate ER (TOPROL-XL) 25 MG 24 hr tablet Take 1 tablet (25 mg) by mouth daily     mometasone-formoterol (DULERA) 100-5 MCG/ACT inhaler Inhale 2 puffs into the lungs 2 times daily     nicotine polacrilex (COMMIT) 2 MG lozenge Dissolve 1 lozenge orally every 4 hours as directed.     nitroGLYcerin (NITROSTAT) 0.4 MG sublingual tablet Place 1 tablet (0.4 mg) under the tongue every 5 minutes as needed for chest pain     nystatin (MYCOSTATIN) 565600 UNIT/GM POWD Apply 1 g topically 3 times daily as needed     order for DME Equipment being ordered: Diabetic Shoes     order for DME Equipment being ordered: two pairs moderate knee high support hose     order for DME Equipment being ordered: Compression socks.  Strength:15-20 mmHg     order for DME One wheeled walker with seat and brakes and basket     oxyCODONE IR (ROXICODONE) 10 MG tablet Take 1 tablet (10 mg) by mouth every 8 hours as needed for moderate to severe pain     polyethylene glycol (MIRALAX) powder Take 17 g (1 capful) by mouth daily as needed for constipation     sodium bicarbonate 325 MG tablet Take 2 tablets (650 mg) by mouth 2 times daily     tiotropium (SPIRIVA HANDIHALER) 18 MCG inhaled capsule Inhale contents of one capsule daily.     Current Facility-Administered Medications   Medication     HOLD MEDICATION       Vitals:  BP (!) 167/97   Pulse 79   Temp 97.7  F (36.5  C) (Oral)   Ht 1.626 m (5' 4\")   Wt 76.3 kg (168 lb 4.8 oz)   SpO2 98%   BMI 28.89 kg/m       Exam:  GENERAL APPEARANCE: chronically ill looking, alert and no distress  HENT:periorbital edema, mouth without ulcers or lesions  LYMPHATICS: no cervical or supraclavicular nodes  RESP:elft lung clear to auscultation in " anterior and posterior fields, expiratory wheezes in the right posterior fields  CV: regular rhythm, normal rate, no rub, no murmur  EDEMA: pitting edema of the upper extremities and 2+ pretibial pitting LE edema bilaterally  ABDOMEN: soft, nondistended, nontender, bowel sounds normal  MS: extremities normal - no gross deformities noted, no evidence of inflammation in joints, no muscle tenderness  SKIN: no rash  Neuro: awake, alert, oriented in time , place , situation and herself, +asterexis     LABS:   CMP  Recent Labs   Lab Test 04/03/19  1202 11/16/18  0947 08/03/18  0859 05/16/18  1030    142 144 142   POTASSIUM 5.8* 5.5* 4.9 5.3   CHLORIDE 119* 116* 116* 115*   CO2 15* 17* 19* 19*   ANIONGAP 10 9 9 8   GLC 92 85 94 103*   BUN 37* 42* 37* 37*   CR 5.38* 5.43* 3.88* 3.86*   GFRESTIMATED 7* 8* 11* 11*   GFRESTBLACK 8* 9* 14* 14*   FRANCES 7.5* 7.1* 7.7* 8.2*     Recent Labs   Lab Test 10/25/17  0639 09/07/17  1135 11/02/16  0622 11/01/16  2335   BILITOTAL 0.2 0.2 0.3 0.4   ALKPHOS 164* 158* 228* 265*   ALT 16 18 21 27   AST 13 19 17 23     CBC  Recent Labs   Lab Test 04/03/19  1202 11/16/18  0947 08/03/18  0859 05/16/18  1030   HGB 11.2* 9.9* 11.9 12.3   WBC 7.4 5.8 7.7 8.4   RBC 3.92 3.44* 4.20 4.32   HCT 34.5* 32.5* 39.1 40.4   MCV 88 95 93 94   MCH 28.6 28.8 28.3 28.5   MCHC 32.5 30.5* 30.4* 30.4*   RDW 16.5* 15.5* 13.9 15.9*    259 305 300     URINE STUDIES  Recent Labs   Lab Test 05/16/18  1030 10/25/17  0640 09/11/17  0952 11/02/16  2235 10/11/16  0844 08/29/16  1652   COLOR Straw Yellow Yellow Yellow Yellow Yellow   APPEARANCE Clear Slightly Cloudy Clear Clear Clear Clear   URINEGLC 150* 150* 100* 150* 100* 100*   URINEBILI Negative Negative Negative Negative Negative Negative   URINEKETONE Negative Negative Negative Negative Negative Negative   SG 1.011 1.019 1.020 1.011 1.025 1.020   UBLD Negative Negative Small* Trace* Trace* Trace*   URINEPH 7.0 6.0 7.0 6.5 7.0 7.0   PROTEIN >499* >499* >=300*  300* >=300* >=300*   UROBILINOGEN  --   --  0.2  --  0.2 0.2   NITRITE Negative Negative Negative Negative Negative Negative   LEUKEST Negative Negative Negative Negative Negative Negative   RBCU 1 1 O - 2 <1 O - 2 O - 2   WBCU 1 3* O - 2 1 O - 2 O - 2     Recent Labs   Lab Test 11/16/18  0907 10/19/18  1528 05/16/18  1030 02/16/18  0950   UTPG 10.45* 13.23* 16.14* 11.34*     PTH  Recent Labs   Lab Test 08/03/18  0859 02/16/18  0945 09/11/17  0928   PTHI 486* 536* 363*     IRON STUDIES  Recent Labs   Lab Test 02/16/18  0945 09/11/17  0928 01/11/17  0946   IRON 46 73 35   * 170* 193*   IRONSAT 28 43 18    127 105       Gabi Leal MD    I have seen and examined this patient with the fellow.  This note reflects our joint assessment and plan.     Makenzie Ortiz MD

## 2019-05-01 NOTE — PROGRESS NOTES
Assessment :     # Stage V CKD:  Secondary to biopsy proven diabetic nephropathy (Patient had native kidney biopsy in Feb of 2015) in settings of solitary right kidney after donating to brother in 1988. Today laboratory work up showed serum BUN of 47 mg/dl, serum creatinine of 4.8 mg/dl and correlating eGFR  8 ml/min. UPCR is 8.8 g/gCr. Patient had UPCR of 10.45 g/gCr in 11/2018    # Hypertension: patient's blood pressure is elevated at 167/87 mmHg. On amlodipine 10 mg daily, toprolol XL 25 mg daily , hydralazine 25 mg BID, Lasix 60 mg (patient dose not take it every day). Patient appears hypervolemic on exam given facial, upper and lower extremity edema. Patient gained 9 lbs in last 4 weeks.She is breathing comfortable on room air.    # Anemia in chronic renal disease:   - Hgb: Stable, 10 g/dl  - Iron studies: Replete    # Electrolytes:   - Potassium; level: High 6.6. ECG showed NSR, no acute changes ( no evidence of arrhythmia, conduction abnormalities). Patient is asymptomatic  - Bicarbonate; level: Low, 19. On PO sodium bicarbonate 650 mg BID    # Mineral Bone Disorder:   - Secondary renal hyperparathyroidism; PTH level was 486 pg/ml in 08/2018. PTH from today is pending  - Vitamin D; level is: Low in 08/18. Repeat vit D level from today is pending. On cholecalciferol 2000U daily  - Corrected for hypoalbuminemia serum calcium; level is:  Normal 9.1  - Phosphorus; level is: Normal 3.1    # Type II DM-diet controlled. Patient's recent HgbA1c was 5.5% in 01/2019.    Plan:  Rapid response team was called.   Patient will be transported by ambulance to the ED at [facility]. Case was discussed over-the-phone with ED physician   Recommend to give IV Calcium gluconate 2 g, sodium bicarbonate 50 mEQ, albuterol nebs, dextrose 25 g followed by insulin 10 units, IV lasix 100 mg. Repeat serum potassium in 2 hours after above is completed and shift if still elevated.  Patient should be monitored by continuous telemetry.    Check VBG  The patient needs initiation of hemodialysis given presence of moderate hyperkalemia, signs and symptoms of uremia. Risks and benefits of hemodialysis were discussed in length with the patient.The patient decided to proceed with hemodialysis.   I discussed patient's case with IR. Patient is scheduled for tunneled catheter placement tomorrow. Admitting team needs to place an inpatient consult after the patient's is admitted to the hospital. Patient needs to be NPO after midnight. INR was added to patient's labs.  Plan is to initiate hemodialysis after tunneled catheter placement tomorrow.  Restart PTA Amlodipine, Toprol XL and Hydralazine    Assessment and plan was discussed with patient and she voiced her understanding and agreement    Patient was staffed with Dr.Manske Gabi Leal MD  Nephrology Fellow  [facility]  Department of Medicine  Division of Renal Disease and Hypertension  [phone]      Reason for Visit:  Kim Anne is a 73 year old female with CKD from diabetes, who presents for routine follow up. Patient was last seen in 2018.    HPI:  This is a 73 year old female with type II DM, solitary right kidney after donating to brother in , HTN, obesity and stage V CKD with nephrotic range proteinuria  who presents for follow up. Patient was last seen by  in 2018 and 2 months follow up was recommended. However the patient did not show for her follow-up appointments. Since last seen has had no medical problems and no changes in her medications.  Patient reports progressive fatigue,  appetite, shortness of breath with exertion and increased upper and lower extremity edema. She admits generalized skin pruritis and muscle twitching in upper and lower extremities. Patient admits chronic cough with clear sputum for last 2 years. Patient states that her urinary output decreased in last 6 months. Patient states that she urinates small amounts  every 2 hours. Patient states that she had few instances in last few months when she suddenly dropped objects from her hands for unclear reason.    Patient denies: fever, chills, weight loss, dizziness, adenopathy, sore throat, rhinorrhea,chest pain, palpitations, orthopnea, nausea, vomiting, abdominal pain, changes in bowel habits, dysuria, urinary urgency, hematuria, rash, muscle weakness.    Home BP: Not checked    ROS:  A comprehensive review of systems was obtained and negative, except as noted in the HPI or PMH.    Active Medical Problems:  Patient Active Problem List   Diagnosis     Hyperlipidemia LDL goal <100     ARAUZ (dyspnea on exertion)     COPD (chronic obstructive pulmonary disease) (H)     Edema     Fatigue     History of MI (myocardial infarction)     Snores     Knee pain, left     Bunion of great toe of left foot     Dystrophic nail     Arthritis     Peripheral vascular disease (H)     Chronic low back pain     Health Care Home     CKD (chronic kidney disease) stage 4, GFR 15-29 ml/min (H)     Abnormal gait     Advance Care Planning     Vitamin D deficiency     Constipation     Hypertension goal BP (blood pressure) < 130/80     Bradycardia     Callus of foot     Other chronic pain     Cataracts, bilateral     Hyperopic astigmatism of both eyes     Presbyopia     Asymptomatic bunion, right     Acquired hypothyroidism     Type 2 diabetes mellitus with diabetic chronic kidney disease (H)     Hypertension associated with diabetes (H)     Hyperlipidemia associated with type 2 diabetes mellitus (H)     Obesity (BMI 30-39.9)     History of sick sinus syndrome     Nephrotic syndrome     Pneumonia     Grief     Cigarette nicotine dependence without complication     HCOM with LVOT     Hyperkalemia     Type 2 diabetes, HbA1C goal < 8% (H)     Smoker     Single kidney     Hypothyroid     Hypertension goal BP (blood pressure) < 140/90     Hyperlipidemia     Diabetic nephropathy (H)     Hypoalbuminemia     Skin  lesion of hand     ERRONEOUS ENCOUNTER--DISREGARD     JUSTINE (generalized anxiety disorder)       Personal Hx:   Social History     Tobacco Use     Smoking status: Current Every Day Smoker     Packs/day: 0.25     Years: 40.00     Pack years: 10.00     Types: Cigarettes     Last attempt to quit: 10/31/2016     Years since quittin.4     Smokeless tobacco: Never Used   Substance Use Topics     Alcohol use: No     Alcohol/week: 0.0 oz       Allergies: Reviewed by provider.     Medications:  Current Outpatient Medications   Medication Sig     albuterol (PROAIR HFA/PROVENTIL HFA/VENTOLIN HFA) 108 (90 Base) MCG/ACT inhaler Inhale 2 puffs into the lungs every 6 hours as needed for shortness of breath / dyspnea or wheezing     Alcohol Swabs (SM ALCOHOL PREP) 70 % PADS Externally apply 1 pad topically 4 times daily     amLODIPine (NORVASC) 10 MG tablet Take 1 tablet (10 mg) by mouth daily     ASPIR-LOW 81 MG EC tablet Take 1 tablet (81 mg) by mouth daily     atorvastatin (LIPITOR) 40 MG tablet Take 1 tablet (40 mg) by mouth daily     blood glucose monitoring (FREESTYLE LITE) test strip TEST BLOOD SUGAR TWO TIMES A DAY     blood glucose monitoring (FREESTYLE) lancets Test BS two times daily as directed     Blood Pressure Monitoring (BLOOD PRESSURE CUFF) MISC 1 each 2 times daily     Cholecalciferol (VITAMIN D) 2000 units tablet Take 2,000 Units by mouth daily     docusate sodium (COLACE) 100 MG capsule Take 1 capsule (100 mg) by mouth 2 times daily     Emollient (EUCERIN CALMING DAILY MOIST) CREA Externally apply 1 dose. topically daily     fluticasone (FLONASE) 50 MCG/ACT nasal spray Spray 1 spray into both nostrils daily     furosemide (LASIX) 40 MG tablet Take 1.5 tablets (60 mg) by mouth daily     hydrALAZINE (APRESOLINE) 25 MG tablet Take 1 tablet (25 mg) by mouth 2 times daily     levothyroxine (SYNTHROID/LEVOTHROID) 200 MCG tablet Take 1 tablet (200 mcg) by mouth daily     metoprolol succinate ER (TOPROL-XL) 25 MG 24  "hr tablet Take 1 tablet (25 mg) by mouth daily     mometasone-formoterol (DULERA) 100-5 MCG/ACT inhaler Inhale 2 puffs into the lungs 2 times daily     nicotine polacrilex (COMMIT) 2 MG lozenge Dissolve 1 lozenge orally every 4 hours as directed.     nitroGLYcerin (NITROSTAT) 0.4 MG sublingual tablet Place 1 tablet (0.4 mg) under the tongue every 5 minutes as needed for chest pain     nystatin (MYCOSTATIN) 597314 UNIT/GM POWD Apply 1 g topically 3 times daily as needed     order for DME Equipment being ordered: Diabetic Shoes     order for DME Equipment being ordered: two pairs moderate knee high support hose     order for DME Equipment being ordered: Compression socks.  Strength:15-20 mmHg     order for DME One wheeled walker with seat and brakes and basket     oxyCODONE IR (ROXICODONE) 10 MG tablet Take 1 tablet (10 mg) by mouth every 8 hours as needed for moderate to severe pain     polyethylene glycol (MIRALAX) powder Take 17 g (1 capful) by mouth daily as needed for constipation     sodium bicarbonate 325 MG tablet Take 2 tablets (650 mg) by mouth 2 times daily     tiotropium (SPIRIVA HANDIHALER) 18 MCG inhaled capsule Inhale contents of one capsule daily.     Current Facility-Administered Medications   Medication     HOLD MEDICATION       Vitals:  BP (!) 167/97   Pulse 79   Temp 97.7  F (36.5  C) (Oral)   Ht 1.626 m (5' 4\")   Wt 76.3 kg (168 lb 4.8 oz)   SpO2 98%   BMI 28.89 kg/m      Exam:  GENERAL APPEARANCE: chronically ill looking, alert and no distress  HENT:periorbital edema, mouth without ulcers or lesions  LYMPHATICS: no cervical or supraclavicular nodes  RESP:elft lung clear to auscultation in anterior and posterior fields, expiratory wheezes in the right posterior fields  CV: regular rhythm, normal rate, no rub, no murmur  EDEMA: pitting edema of the upper extremities and 2+ pretibial pitting LE edema bilaterally  ABDOMEN: soft, nondistended, nontender, bowel sounds normal  MS: extremities " normal - no gross deformities noted, no evidence of inflammation in joints, no muscle tenderness  SKIN: no rash  Neuro: awake, alert, oriented in time , place , situation and herself, +asterexis     LABS:   CMP  Recent Labs   Lab Test 04/03/19  1202 11/16/18  0947 08/03/18  0859 05/16/18  1030    142 144 142   POTASSIUM 5.8* 5.5* 4.9 5.3   CHLORIDE 119* 116* 116* 115*   CO2 15* 17* 19* 19*   ANIONGAP 10 9 9 8   GLC 92 85 94 103*   BUN 37* 42* 37* 37*   CR 5.38* 5.43* 3.88* 3.86*   GFRESTIMATED 7* 8* 11* 11*   GFRESTBLACK 8* 9* 14* 14*   FRANCES 7.5* 7.1* 7.7* 8.2*     Recent Labs   Lab Test 10/25/17  0639 09/07/17  1135 11/02/16  0622 11/01/16  2335   BILITOTAL 0.2 0.2 0.3 0.4   ALKPHOS 164* 158* 228* 265*   ALT 16 18 21 27   AST 13 19 17 23     CBC  Recent Labs   Lab Test 04/03/19  1202 11/16/18  0947 08/03/18  0859 05/16/18  1030   HGB 11.2* 9.9* 11.9 12.3   WBC 7.4 5.8 7.7 8.4   RBC 3.92 3.44* 4.20 4.32   HCT 34.5* 32.5* 39.1 40.4   MCV 88 95 93 94   MCH 28.6 28.8 28.3 28.5   MCHC 32.5 30.5* 30.4* 30.4*   RDW 16.5* 15.5* 13.9 15.9*    259 305 300     URINE STUDIES  Recent Labs   Lab Test 05/16/18  1030 10/25/17  0640 09/11/17  0952 11/02/16  2235 10/11/16  0844 08/29/16  1652   COLOR Straw Yellow Yellow Yellow Yellow Yellow   APPEARANCE Clear Slightly Cloudy Clear Clear Clear Clear   URINEGLC 150* 150* 100* 150* 100* 100*   URINEBILI Negative Negative Negative Negative Negative Negative   URINEKETONE Negative Negative Negative Negative Negative Negative   SG 1.011 1.019 1.020 1.011 1.025 1.020   UBLD Negative Negative Small* Trace* Trace* Trace*   URINEPH 7.0 6.0 7.0 6.5 7.0 7.0   PROTEIN >499* >499* >=300* 300* >=300* >=300*   UROBILINOGEN  --   --  0.2  --  0.2 0.2   NITRITE Negative Negative Negative Negative Negative Negative   LEUKEST Negative Negative Negative Negative Negative Negative   RBCU 1 1 O - 2 <1 O - 2 O - 2   WBCU 1 3* O - 2 1 O - 2 O - 2     Recent Labs   Lab Test 11/16/18  0907  10/19/18  1528 05/16/18  1030 02/16/18  0950   UTPG 10.45* 13.23* 16.14* 11.34*     PTH  Recent Labs   Lab Test 08/03/18  0859 02/16/18  0945 09/11/17  0928   PTHI 486* 536* 363*     IRON STUDIES  Recent Labs   Lab Test 02/16/18  0945 09/11/17  0928 01/11/17  0946   IRON 46 73 35   * 170* 193*   IRONSAT 28 43 18    127 105       Gabi Leal MD    I have seen and examined this patient with the fellow.  This note reflects our joint assessment and plan.     Makenzie Ortiz MD

## 2019-05-01 NOTE — NURSING NOTE
"Chief Complaint   Patient presents with     RECHECK     CKD (chronic kidney disease) stage 4     BP (!) 167/97   Pulse 79   Temp 97.7  F (36.5  C) (Oral)   Ht 1.626 m (5' 4\")   Wt 76.3 kg (168 lb 4.8 oz)   SpO2 98%   BMI 28.89 kg/m    Marizol Ignacio, GRIS    "

## 2019-05-01 NOTE — ED TRIAGE NOTES
HX kidney donor in 1988.  Pt now in ESRD  Pt at clinic for routine labs and found to have elevated K+ of 6.6  EMS called and pt transported to ER
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
## 2019-05-01 NOTE — PHARMACY-CONSULT NOTE
Patient is being treated for hyperkalemia. A pharmacy consult was initiated to review the patient's medication list for possible causes of hyperkalemia.  The following medications for this patient may cause or exacerbate hyperkalemia: Aspirin     Though this may cause hyperkalemia the incidence of aspirin-inducing hyperkalemia is low. Would recommend continuing therapy as benefit outweighs risk of discontinuing     Ricardo Maya Pharm.D.

## 2019-05-02 ENCOUNTER — PATIENT OUTREACH (OUTPATIENT)
Dept: GERIATRIC MEDICINE | Facility: CLINIC | Age: 74
End: 2019-05-02

## 2019-05-02 ENCOUNTER — APPOINTMENT (OUTPATIENT)
Dept: OCCUPATIONAL THERAPY | Facility: CLINIC | Age: 74
DRG: 640 | End: 2019-05-02
Attending: PHYSICIAN ASSISTANT
Payer: COMMERCIAL

## 2019-05-02 ENCOUNTER — APPOINTMENT (OUTPATIENT)
Dept: GENERAL RADIOLOGY | Facility: CLINIC | Age: 74
DRG: 640 | End: 2019-05-02
Attending: HOSPITALIST
Payer: COMMERCIAL

## 2019-05-02 ENCOUNTER — APPOINTMENT (OUTPATIENT)
Dept: INTERVENTIONAL RADIOLOGY/VASCULAR | Facility: CLINIC | Age: 74
DRG: 640 | End: 2019-05-02
Attending: NURSE PRACTITIONER
Payer: COMMERCIAL

## 2019-05-02 LAB
ANION GAP SERPL CALCULATED.3IONS-SCNC: 5 MMOL/L (ref 3–14)
ANION GAP SERPL CALCULATED.3IONS-SCNC: 8 MMOL/L (ref 3–14)
BUN SERPL-MCNC: 43 MG/DL (ref 7–30)
BUN SERPL-MCNC: 45 MG/DL (ref 7–30)
CALCIUM SERPL-MCNC: 7 MG/DL (ref 8.5–10.1)
CALCIUM SERPL-MCNC: 7 MG/DL (ref 8.5–10.1)
CHLORIDE SERPL-SCNC: 119 MMOL/L (ref 94–109)
CHLORIDE SERPL-SCNC: 123 MMOL/L (ref 94–109)
CO2 SERPL-SCNC: 14 MMOL/L (ref 20–32)
CO2 SERPL-SCNC: 21 MMOL/L (ref 20–32)
CREAT SERPL-MCNC: 4.74 MG/DL (ref 0.52–1.04)
CREAT SERPL-MCNC: 4.77 MG/DL (ref 0.52–1.04)
DEPRECATED CALCIDIOL+CALCIFEROL SERPL-MC: 12 UG/L (ref 20–75)
DEPRECATED CALCIDIOL+CALCIFEROL SERPL-MC: 15 UG/L (ref 20–75)
GFR SERPL CREATININE-BSD FRML MDRD: 8 ML/MIN/{1.73_M2}
GFR SERPL CREATININE-BSD FRML MDRD: 8 ML/MIN/{1.73_M2}
GLUCOSE BLDC GLUCOMTR-MCNC: 118 MG/DL (ref 70–99)
GLUCOSE BLDC GLUCOMTR-MCNC: 139 MG/DL (ref 70–99)
GLUCOSE BLDC GLUCOMTR-MCNC: 153 MG/DL (ref 70–99)
GLUCOSE BLDC GLUCOMTR-MCNC: 35 MG/DL (ref 70–99)
GLUCOSE BLDC GLUCOMTR-MCNC: 88 MG/DL (ref 70–99)
GLUCOSE BLDC GLUCOMTR-MCNC: 88 MG/DL (ref 70–99)
GLUCOSE BLDC GLUCOMTR-MCNC: 90 MG/DL (ref 70–99)
GLUCOSE BLDC GLUCOMTR-MCNC: 96 MG/DL (ref 70–99)
GLUCOSE BLDC GLUCOMTR-MCNC: 97 MG/DL (ref 70–99)
GLUCOSE SERPL-MCNC: 40 MG/DL (ref 70–99)
GLUCOSE SERPL-MCNC: 87 MG/DL (ref 70–99)
HBA1C MFR BLD: 5.2 % (ref 0–5.6)
INTERPRETATION ECG - MUSE: NORMAL
MAGNESIUM SERPL-MCNC: 2 MG/DL (ref 1.6–2.3)
POTASSIUM SERPL-SCNC: 5.8 MMOL/L (ref 3.4–5.3)
POTASSIUM SERPL-SCNC: 5.9 MMOL/L (ref 3.4–5.3)
POTASSIUM SERPL-SCNC: 6 MMOL/L (ref 3.4–5.3)
SODIUM SERPL-SCNC: 144 MMOL/L (ref 133–144)
SODIUM SERPL-SCNC: 145 MMOL/L (ref 133–144)

## 2019-05-02 PROCEDURE — 90937 HEMODIALYSIS REPEATED EVAL: CPT

## 2019-05-02 PROCEDURE — 86706 HEP B SURFACE ANTIBODY: CPT | Performed by: INTERNAL MEDICINE

## 2019-05-02 PROCEDURE — 02H633Z INSERTION OF INFUSION DEVICE INTO RIGHT ATRIUM, PERCUTANEOUS APPROACH: ICD-10-PCS | Performed by: RADIOLOGY

## 2019-05-02 PROCEDURE — 12000001 ZZH R&B MED SURG/OB UMMC

## 2019-05-02 PROCEDURE — 76937 US GUIDE VASCULAR ACCESS: CPT

## 2019-05-02 PROCEDURE — 25000132 ZZH RX MED GY IP 250 OP 250 PS 637: Performed by: EMERGENCY MEDICINE

## 2019-05-02 PROCEDURE — 96361 HYDRATE IV INFUSION ADD-ON: CPT | Performed by: EMERGENCY MEDICINE

## 2019-05-02 PROCEDURE — 25000132 ZZH RX MED GY IP 250 OP 250 PS 637: Performed by: HOSPITALIST

## 2019-05-02 PROCEDURE — 99232 SBSQ HOSP IP/OBS MODERATE 35: CPT | Performed by: HOSPITALIST

## 2019-05-02 PROCEDURE — 25000128 H RX IP 250 OP 636: Performed by: INTERNAL MEDICINE

## 2019-05-02 PROCEDURE — C1750 CATH, HEMODIALYSIS,LONG-TERM: HCPCS

## 2019-05-02 PROCEDURE — 25800025 ZZH RX 258: Performed by: HOSPITALIST

## 2019-05-02 PROCEDURE — 99207 ZZC CDG-MDM COMPONENT: MEETS LOW - DOWN CODED: CPT | Performed by: HOSPITALIST

## 2019-05-02 PROCEDURE — 27210908 ZZH NEEDLE CR4

## 2019-05-02 PROCEDURE — 84132 ASSAY OF SERUM POTASSIUM: CPT | Performed by: HOSPITALIST

## 2019-05-02 PROCEDURE — 96375 TX/PRO/DX INJ NEW DRUG ADDON: CPT | Performed by: EMERGENCY MEDICINE

## 2019-05-02 PROCEDURE — 25000132 ZZH RX MED GY IP 250 OP 250 PS 637: Performed by: PHYSICIAN ASSISTANT

## 2019-05-02 PROCEDURE — 87340 HEPATITIS B SURFACE AG IA: CPT | Performed by: INTERNAL MEDICINE

## 2019-05-02 PROCEDURE — 25000125 ZZHC RX 250: Performed by: STUDENT IN AN ORGANIZED HEALTH CARE EDUCATION/TRAINING PROGRAM

## 2019-05-02 PROCEDURE — 25000132 ZZH RX MED GY IP 250 OP 250 PS 637: Performed by: INTERNAL MEDICINE

## 2019-05-02 PROCEDURE — 99153 MOD SED SAME PHYS/QHP EA: CPT

## 2019-05-02 PROCEDURE — 80048 BASIC METABOLIC PNL TOTAL CA: CPT | Performed by: EMERGENCY MEDICINE

## 2019-05-02 PROCEDURE — C1769 GUIDE WIRE: HCPCS

## 2019-05-02 PROCEDURE — 0JH63XZ INSERTION OF TUNNELED VASCULAR ACCESS DEVICE INTO CHEST SUBCUTANEOUS TISSUE AND FASCIA, PERCUTANEOUS APPROACH: ICD-10-PCS | Performed by: RADIOLOGY

## 2019-05-02 PROCEDURE — 5A1D70Z PERFORMANCE OF URINARY FILTRATION, INTERMITTENT, LESS THAN 6 HOURS PER DAY: ICD-10-PCS | Performed by: INTERNAL MEDICINE

## 2019-05-02 PROCEDURE — 80048 BASIC METABOLIC PNL TOTAL CA: CPT | Performed by: INTERNAL MEDICINE

## 2019-05-02 PROCEDURE — 99152 MOD SED SAME PHYS/QHP 5/>YRS: CPT

## 2019-05-02 PROCEDURE — 25800025 ZZH RX 258: Performed by: INTERNAL MEDICINE

## 2019-05-02 PROCEDURE — 36416 COLLJ CAPILLARY BLOOD SPEC: CPT | Performed by: INTERNAL MEDICINE

## 2019-05-02 PROCEDURE — 96376 TX/PRO/DX INJ SAME DRUG ADON: CPT | Performed by: EMERGENCY MEDICINE

## 2019-05-02 PROCEDURE — 25000128 H RX IP 250 OP 636: Performed by: STUDENT IN AN ORGANIZED HEALTH CARE EDUCATION/TRAINING PROGRAM

## 2019-05-02 PROCEDURE — 82306 VITAMIN D 25 HYDROXY: CPT | Performed by: EMERGENCY MEDICINE

## 2019-05-02 PROCEDURE — 25800025 ZZH RX 258: Performed by: PHYSICIAN ASSISTANT

## 2019-05-02 PROCEDURE — 71046 X-RAY EXAM CHEST 2 VIEWS: CPT

## 2019-05-02 PROCEDURE — 36558 INSERT TUNNELED CV CATH: CPT | Mod: LT

## 2019-05-02 PROCEDURE — 36416 COLLJ CAPILLARY BLOOD SPEC: CPT | Performed by: HOSPITALIST

## 2019-05-02 PROCEDURE — 27210904 ZZH KIT CR6

## 2019-05-02 PROCEDURE — 97535 SELF CARE MNGMENT TRAINING: CPT | Mod: GO | Performed by: OCCUPATIONAL THERAPIST

## 2019-05-02 PROCEDURE — 00000146 ZZHCL STATISTIC GLUCOSE BY METER IP

## 2019-05-02 PROCEDURE — 83735 ASSAY OF MAGNESIUM: CPT | Performed by: INTERNAL MEDICINE

## 2019-05-02 PROCEDURE — G0463 HOSPITAL OUTPT CLINIC VISIT: HCPCS

## 2019-05-02 PROCEDURE — 27210738 ZZH ACCESSORY CR2

## 2019-05-02 PROCEDURE — 27210732 ZZH ACCESSORY CR1

## 2019-05-02 PROCEDURE — 25000128 H RX IP 250 OP 636: Performed by: NURSE PRACTITIONER

## 2019-05-02 PROCEDURE — 97165 OT EVAL LOW COMPLEX 30 MIN: CPT | Mod: GO | Performed by: OCCUPATIONAL THERAPIST

## 2019-05-02 RX ORDER — AMLODIPINE BESYLATE 10 MG/1
10 TABLET ORAL DAILY
Status: DISCONTINUED | OUTPATIENT
Start: 2019-05-02 | End: 2019-05-03

## 2019-05-02 RX ORDER — BUMETANIDE 0.25 MG/ML
2 INJECTION INTRAMUSCULAR; INTRAVENOUS ONCE
Status: COMPLETED | OUTPATIENT
Start: 2019-05-02 | End: 2019-05-02

## 2019-05-02 RX ORDER — HEPARIN SODIUM 1000 [USP'U]/ML
3 INJECTION, SOLUTION INTRAVENOUS; SUBCUTANEOUS ONCE
Status: CANCELLED | OUTPATIENT
Start: 2019-05-02 | End: 2019-05-02

## 2019-05-02 RX ORDER — SODIUM POLYSTYRENE SULFONATE 4.1 MEQ/G
60 POWDER, FOR SUSPENSION ORAL; RECTAL ONCE
Status: COMPLETED | OUTPATIENT
Start: 2019-05-02 | End: 2019-05-02

## 2019-05-02 RX ORDER — FENTANYL CITRATE 50 UG/ML
25-50 INJECTION, SOLUTION INTRAMUSCULAR; INTRAVENOUS EVERY 5 MIN PRN
Status: DISCONTINUED | OUTPATIENT
Start: 2019-05-02 | End: 2019-05-02 | Stop reason: HOSPADM

## 2019-05-02 RX ORDER — DEXTROSE MONOHYDRATE 25 G/50ML
25-50 INJECTION, SOLUTION INTRAVENOUS
Status: DISCONTINUED | OUTPATIENT
Start: 2019-05-02 | End: 2019-05-02

## 2019-05-02 RX ORDER — NICOTINE POLACRILEX 4 MG
15-30 LOZENGE BUCCAL
Status: DISCONTINUED | OUTPATIENT
Start: 2019-05-02 | End: 2019-05-02

## 2019-05-02 RX ORDER — HYDRALAZINE HYDROCHLORIDE 25 MG/1
25 TABLET, FILM COATED ORAL 2 TIMES DAILY
Status: DISCONTINUED | OUTPATIENT
Start: 2019-05-02 | End: 2019-05-03

## 2019-05-02 RX ORDER — DEXTROSE MONOHYDRATE 100 MG/ML
INJECTION, SOLUTION INTRAVENOUS CONTINUOUS
Status: ACTIVE | OUTPATIENT
Start: 2019-05-02 | End: 2019-05-02

## 2019-05-02 RX ORDER — ONDANSETRON 2 MG/ML
4 INJECTION INTRAMUSCULAR; INTRAVENOUS EVERY 6 HOURS PRN
Status: DISCONTINUED | OUTPATIENT
Start: 2019-05-02 | End: 2019-05-05 | Stop reason: HOSPADM

## 2019-05-02 RX ORDER — DEXTROSE MONOHYDRATE 25 G/50ML
25-50 INJECTION, SOLUTION INTRAVENOUS
Status: DISCONTINUED | OUTPATIENT
Start: 2019-05-02 | End: 2019-05-05 | Stop reason: HOSPADM

## 2019-05-02 RX ORDER — CEFAZOLIN SODIUM 2 G/100ML
2 INJECTION, SOLUTION INTRAVENOUS
Status: COMPLETED | OUTPATIENT
Start: 2019-05-02 | End: 2019-05-02

## 2019-05-02 RX ORDER — BISACODYL 5 MG
10 TABLET, DELAYED RELEASE (ENTERIC COATED) ORAL DAILY PRN
Status: DISCONTINUED | OUTPATIENT
Start: 2019-05-02 | End: 2019-05-05 | Stop reason: HOSPADM

## 2019-05-02 RX ORDER — HEPARIN SODIUM 1000 [USP'U]/ML
3 INJECTION, SOLUTION INTRAVENOUS; SUBCUTANEOUS ONCE
Status: COMPLETED | OUTPATIENT
Start: 2019-05-02 | End: 2019-05-02

## 2019-05-02 RX ORDER — NALOXONE HYDROCHLORIDE 0.4 MG/ML
.1-.4 INJECTION, SOLUTION INTRAMUSCULAR; INTRAVENOUS; SUBCUTANEOUS
Status: DISCONTINUED | OUTPATIENT
Start: 2019-05-02 | End: 2019-05-05 | Stop reason: HOSPADM

## 2019-05-02 RX ORDER — METOPROLOL SUCCINATE 25 MG/1
25 TABLET, EXTENDED RELEASE ORAL DAILY
Status: DISCONTINUED | OUTPATIENT
Start: 2019-05-02 | End: 2019-05-03

## 2019-05-02 RX ORDER — NICOTINE POLACRILEX 4 MG
15-30 LOZENGE BUCCAL
Status: DISCONTINUED | OUTPATIENT
Start: 2019-05-02 | End: 2019-05-05 | Stop reason: HOSPADM

## 2019-05-02 RX ORDER — FLUMAZENIL 0.1 MG/ML
0.2 INJECTION, SOLUTION INTRAVENOUS
Status: DISCONTINUED | OUTPATIENT
Start: 2019-05-02 | End: 2019-05-02 | Stop reason: HOSPADM

## 2019-05-02 RX ORDER — NALOXONE HYDROCHLORIDE 0.4 MG/ML
.1-.4 INJECTION, SOLUTION INTRAMUSCULAR; INTRAVENOUS; SUBCUTANEOUS
Status: DISCONTINUED | OUTPATIENT
Start: 2019-05-02 | End: 2019-05-02 | Stop reason: HOSPADM

## 2019-05-02 RX ORDER — LABETALOL 20 MG/4 ML (5 MG/ML) INTRAVENOUS SYRINGE
10
Status: DISCONTINUED | OUTPATIENT
Start: 2019-05-02 | End: 2019-05-05 | Stop reason: HOSPADM

## 2019-05-02 RX ORDER — BISACODYL 5 MG
5 TABLET, DELAYED RELEASE (ENTERIC COATED) ORAL DAILY PRN
Status: DISCONTINUED | OUTPATIENT
Start: 2019-05-02 | End: 2019-05-05 | Stop reason: HOSPADM

## 2019-05-02 RX ORDER — DEXTROSE MONOHYDRATE 25 G/50ML
25-50 INJECTION, SOLUTION INTRAVENOUS
Status: CANCELLED | OUTPATIENT
Start: 2019-05-02

## 2019-05-02 RX ORDER — NICOTINE POLACRILEX 4 MG
15-30 LOZENGE BUCCAL
Status: CANCELLED | OUTPATIENT
Start: 2019-05-02

## 2019-05-02 RX ORDER — OXYCODONE HYDROCHLORIDE 5 MG/1
10 TABLET ORAL EVERY 8 HOURS PRN
Status: DISCONTINUED | OUTPATIENT
Start: 2019-05-02 | End: 2019-05-05 | Stop reason: HOSPADM

## 2019-05-02 RX ORDER — SODIUM POLYSTYRENE SULFONATE 15 G/60ML
30 SUSPENSION ORAL; RECTAL ONCE
Status: DISCONTINUED | OUTPATIENT
Start: 2019-05-02 | End: 2019-05-02

## 2019-05-02 RX ORDER — LEVOTHYROXINE SODIUM 100 UG/1
200 TABLET ORAL
Status: DISCONTINUED | OUTPATIENT
Start: 2019-05-02 | End: 2019-05-05 | Stop reason: HOSPADM

## 2019-05-02 RX ORDER — ACETAMINOPHEN 325 MG/1
650 TABLET ORAL EVERY 4 HOURS PRN
Status: DISCONTINUED | OUTPATIENT
Start: 2019-05-02 | End: 2019-05-05 | Stop reason: HOSPADM

## 2019-05-02 RX ORDER — ATORVASTATIN CALCIUM 40 MG/1
40 TABLET, FILM COATED ORAL DAILY
Status: DISCONTINUED | OUTPATIENT
Start: 2019-05-02 | End: 2019-05-05 | Stop reason: HOSPADM

## 2019-05-02 RX ORDER — AMOXICILLIN 250 MG
2 CAPSULE ORAL 2 TIMES DAILY
Status: DISCONTINUED | OUTPATIENT
Start: 2019-05-02 | End: 2019-05-05 | Stop reason: HOSPADM

## 2019-05-02 RX ORDER — BISACODYL 5 MG
15 TABLET, DELAYED RELEASE (ENTERIC COATED) ORAL DAILY PRN
Status: DISCONTINUED | OUTPATIENT
Start: 2019-05-02 | End: 2019-05-05 | Stop reason: HOSPADM

## 2019-05-02 RX ORDER — AMOXICILLIN 250 MG
1 CAPSULE ORAL 2 TIMES DAILY
Status: DISCONTINUED | OUTPATIENT
Start: 2019-05-02 | End: 2019-05-05 | Stop reason: HOSPADM

## 2019-05-02 RX ORDER — ALBUTEROL SULFATE 90 UG/1
2 AEROSOL, METERED RESPIRATORY (INHALATION) EVERY 6 HOURS PRN
Status: DISCONTINUED | OUTPATIENT
Start: 2019-05-02 | End: 2019-05-05 | Stop reason: HOSPADM

## 2019-05-02 RX ORDER — ONDANSETRON 4 MG/1
4 TABLET, ORALLY DISINTEGRATING ORAL EVERY 6 HOURS PRN
Status: DISCONTINUED | OUTPATIENT
Start: 2019-05-02 | End: 2019-05-05 | Stop reason: HOSPADM

## 2019-05-02 RX ORDER — SODIUM BICARBONATE 650 MG/1
650 TABLET ORAL 2 TIMES DAILY
Status: DISCONTINUED | OUTPATIENT
Start: 2019-05-02 | End: 2019-05-05 | Stop reason: HOSPADM

## 2019-05-02 RX ORDER — POLYETHYLENE GLYCOL 3350 17 G/17G
17 POWDER, FOR SOLUTION ORAL DAILY PRN
Status: DISCONTINUED | OUTPATIENT
Start: 2019-05-02 | End: 2019-05-05 | Stop reason: HOSPADM

## 2019-05-02 RX ADMIN — UMECLIDINIUM 1 PUFF: 62.5 AEROSOL, POWDER ORAL at 08:05

## 2019-05-02 RX ADMIN — FENTANYL CITRATE 25 MCG: 50 INJECTION INTRAMUSCULAR; INTRAVENOUS at 15:35

## 2019-05-02 RX ADMIN — HEPARIN SODIUM 2.5 ML: 1000 INJECTION, SOLUTION INTRAVENOUS; SUBCUTANEOUS at 16:05

## 2019-05-02 RX ADMIN — HUMAN INSULIN 8 UNITS: 100 INJECTION, SOLUTION SUBCUTANEOUS at 00:52

## 2019-05-02 RX ADMIN — SODIUM POLYSTYRENE SULFONATE 60 G: 1 POWDER ORAL; RECTAL at 09:29

## 2019-05-02 RX ADMIN — SODIUM BICARBONATE 650 MG TABLET 650 MG: at 08:04

## 2019-05-02 RX ADMIN — DEXTROSE MONOHYDRATE: 100 INJECTION, SOLUTION INTRAVENOUS at 11:13

## 2019-05-02 RX ADMIN — FLUTICASONE FUROATE AND VILANTEROL TRIFENATATE 1 PUFF: 100; 25 POWDER RESPIRATORY (INHALATION) at 08:05

## 2019-05-02 RX ADMIN — ACETAMINOPHEN 650 MG: 325 TABLET, FILM COATED ORAL at 19:15

## 2019-05-02 RX ADMIN — OXYCODONE HYDROCHLORIDE 10 MG: 5 TABLET ORAL at 19:15

## 2019-05-02 RX ADMIN — SENNOSIDES AND DOCUSATE SODIUM 1 TABLET: 8.6; 5 TABLET ORAL at 20:17

## 2019-05-02 RX ADMIN — Medication: at 17:13

## 2019-05-02 RX ADMIN — BUMETANIDE 2 MG: 0.25 INJECTION INTRAMUSCULAR; INTRAVENOUS at 00:16

## 2019-05-02 RX ADMIN — LEVOTHYROXINE SODIUM 200 MCG: 100 TABLET ORAL at 08:02

## 2019-05-02 RX ADMIN — SODIUM POLYSTYRENE SULFONATE 30 G: 15 SUSPENSION ORAL; RECTAL at 00:16

## 2019-05-02 RX ADMIN — AMLODIPINE BESYLATE 10 MG: 10 TABLET ORAL at 08:04

## 2019-05-02 RX ADMIN — CEFAZOLIN SODIUM 2 G: 2 INJECTION, SOLUTION INTRAVENOUS at 15:25

## 2019-05-02 RX ADMIN — HYDRALAZINE HYDROCHLORIDE 25 MG: 25 TABLET ORAL at 20:17

## 2019-05-02 RX ADMIN — SENNOSIDES AND DOCUSATE SODIUM 1 TABLET: 8.6; 5 TABLET ORAL at 08:02

## 2019-05-02 RX ADMIN — DEXTROSE MONOHYDRATE: 100 INJECTION, SOLUTION INTRAVENOUS at 05:48

## 2019-05-02 RX ADMIN — HEPARIN SODIUM 2.5 ML: 1000 INJECTION, SOLUTION INTRAVENOUS; SUBCUTANEOUS at 16:03

## 2019-05-02 RX ADMIN — LIDOCAINE HYDROCHLORIDE 25 ML: 10 INJECTION, SOLUTION EPIDURAL; INFILTRATION; INTRACAUDAL; PERINEURAL at 16:05

## 2019-05-02 RX ADMIN — ATORVASTATIN CALCIUM 40 MG: 40 TABLET, FILM COATED ORAL at 08:04

## 2019-05-02 RX ADMIN — METOPROLOL SUCCINATE 25 MG: 25 TABLET, EXTENDED RELEASE ORAL at 08:04

## 2019-05-02 RX ADMIN — MIDAZOLAM HYDROCHLORIDE 0.5 MG: 1 INJECTION, SOLUTION INTRAMUSCULAR; INTRAVENOUS at 15:35

## 2019-05-02 RX ADMIN — DEXTROSE MONOHYDRATE 50 ML: 25 INJECTION, SOLUTION INTRAVENOUS at 04:04

## 2019-05-02 RX ADMIN — DEXTROSE MONOHYDRATE: 100 INJECTION, SOLUTION INTRAVENOUS at 00:16

## 2019-05-02 RX ADMIN — FUROSEMIDE 60 MG: 20 TABLET ORAL at 08:02

## 2019-05-02 RX ADMIN — SODIUM CHLORIDE 300 ML: 9 INJECTION, SOLUTION INTRAVENOUS at 17:13

## 2019-05-02 RX ADMIN — HYDRALAZINE HYDROCHLORIDE 25 MG: 25 TABLET ORAL at 08:04

## 2019-05-02 RX ADMIN — DEXTROSE MONOHYDRATE 25 G: 25 INJECTION, SOLUTION INTRAVENOUS at 00:52

## 2019-05-02 RX ADMIN — SODIUM CHLORIDE 250 ML: 9 INJECTION, SOLUTION INTRAVENOUS at 17:13

## 2019-05-02 RX ADMIN — SODIUM BICARBONATE 650 MG TABLET 650 MG: at 20:17

## 2019-05-02 ASSESSMENT — ACTIVITIES OF DAILY LIVING (ADL)
ADLS_ACUITY_SCORE: 13
ADLS_ACUITY_SCORE: 13
ADLS_ACUITY_SCORE: 14

## 2019-05-02 ASSESSMENT — MIFFLIN-ST. JEOR
SCORE: 1253.4
SCORE: 1252.04

## 2019-05-02 NOTE — PHARMACY-ADMISSION MEDICATION HISTORY
"Admission medication history interview status for the 5/1/2019 admission is complete. See Epic admission navigator for allergy information, pharmacy, prior to admission medications and immunization status.     Medication history interview sources:  Patient, Surescripts      Changes made to PTA medication list (reason)    Added: n/a    Deleted:   -nicotine 2 mg lozenge q4h PRN (pt stopped)    Changed: n/a      Additional medication history information (including reliability of information, actions taken by pharmacist):  -Patient knew her medications fairly well and was a reliable historian.   -Patient reported that she last used Nitrostat about one month ago.  -Patient reported she stopped at home use of nicotine lozenge \"a while ago\".  -Patient has had none of her daily scheduled meds so far today.        Prior to Admission medications    Medication Sig Last Dose Taking? Auth Provider   albuterol (PROAIR HFA/PROVENTIL HFA/VENTOLIN HFA) 108 (90 Base) MCG/ACT inhaler Inhale 2 puffs into the lungs every 6 hours as needed for shortness of breath / dyspnea or wheezing 4/30/2019 at Unknown time Yes Mireya Baldwin APRN CNP   amLODIPine (NORVASC) 10 MG tablet Take 1 tablet (10 mg) by mouth daily 4/30/2019 at Unknown time Yes Mireya Baldwin APRN CNP   ASPIR-LOW 81 MG EC tablet Take 1 tablet (81 mg) by mouth daily 4/30/2019 at Unknown time Yes Mireya Baldwin APRN CNP   atorvastatin (LIPITOR) 40 MG tablet Take 1 tablet (40 mg) by mouth daily 4/30/2019 at Unknown time Yes Ailyn Nieves APRN CNP   Cholecalciferol (VITAMIN D) 2000 units tablet Take 2,000 Units by mouth daily 4/30/2019 at Unknown time Yes Ailyn Nieves APRN CNP   docusate sodium (COLACE) 100 MG capsule Take 1 capsule (100 mg) by mouth 2 times daily 4/30/2019 at Unknown time Yes Mireya Baldwin APRN CNP   fluticasone (FLONASE) 50 MCG/ACT nasal spray Spray 1 spray into both nostrils daily 4/30/2019 at Unknown time Yes " Mireya Baldwin APRN CNP   furosemide (LASIX) 40 MG tablet Take 1.5 tablets (60 mg) by mouth daily 4/30/2019 at Unknown time Yes Wendy Espinal PA-C   hydrALAZINE (APRESOLINE) 25 MG tablet Take 1 tablet (25 mg) by mouth 2 times daily 4/30/2019 at Unknown time Yes Mireya Baldwin APRN CNP   levothyroxine (SYNTHROID/LEVOTHROID) 200 MCG tablet Take 1 tablet (200 mcg) by mouth daily 4/30/2019 at Unknown time Yes Ailyn Nieves APRN CNP   metoprolol succinate ER (TOPROL-XL) 25 MG 24 hr tablet Take 1 tablet (25 mg) by mouth daily 4/30/2019 at Unknown time Yes Mireya Baldwin APRN CNP   mometasone-formoterol (DULERA) 100-5 MCG/ACT inhaler Inhale 2 puffs into the lungs 2 times daily 4/30/2019 at Unknown time Yes Mireya Baldwin APRN CNP   nitroGLYcerin (NITROSTAT) 0.4 MG sublingual tablet Place 1 tablet (0.4 mg) under the tongue every 5 minutes as needed for chest pain Past Month at Unknown time Yes Wendy Espinal PA-C   nystatin (MYCOSTATIN) [id number] UNIT/GM POWD Apply 1 g topically 3 times daily as needed Past Week at Unknown time Yes Mai Babcock MD   oxyCODONE IR (ROXICODONE) 10 MG tablet Take 1 tablet (10 mg) by mouth every 8 hours as needed for moderate to severe pain 4/30/2019 at PM Yes Mireya Baldwin APRN CNP   sodium bicarbonate 325 MG tablet Take 2 tablets (650 mg) by mouth 2 times daily 4/30/2019 at Unknown time Yes Wendy Espinal PA-C   tiotropium (SPIRIVA HANDIHALER) 18 MCG inhaled capsule Inhale contents of one capsule daily. 4/30/2019 at Unknown time Yes Mireya Baldwin APRN CNP   Alcohol Swabs (SM ALCOHOL PREP) 70 % PADS Externally apply 1 pad topically 4 times daily   Ailyn Nieves APRN CNP   blood glucose monitoring (FREESTYLE LITE) test strip TEST BLOOD SUGAR TWO TIMES A DAY   Ailyn Nieves APRN CNP   blood glucose monitoring (FREESTYLE) lancets Test BS two times daily as directed   Ailyn Nieves APRN CNP   Blood Pressure  Monitoring (BLOOD PRESSURE CUFF) MISC 1 each 2 times daily   Mireya Baldwin APRN CNP   Emollient (EUCERIN CALMING DAILY MOIST) CREA Externally apply 1 dose. topically daily Unknown at Unknown time  Ailyn Nieves APRN CNP   order for DME Equipment being ordered: Diabetic Shoes   Ailyn Nieves APRN CNP   order for DME Equipment being ordered: two pairs moderate knee high support hose   Ailyn Nieves APRN CNP   order for DME Equipment being ordered: Compression socks.  Strength:15-20 mmHg   Ailyn Nieves APRN CNP   order for DME One wheeled walker with seat and brakes and basket   Mai Babcock MD   polyethylene glycol (MIRALAX) powder Take 17 g (1 capful) by mouth daily as needed for constipation Unknown at Unknown time  Ailyn Nieves APRN CNP         Medication history completed by: Debra Pruitt, Pharmacy student

## 2019-05-02 NOTE — PROGRESS NOTES
[facility], [place]    Hospitalist Progress Note    Date of Service (when I saw the patient): 05/02/2019    Assessment & Plan     Kim Anne is a 73 year old female admitted on 5/1/2019. She has PMH of DMII, ESRD, HLD, COPD, Hypothyroidism, Tobacco abuse who presents to the ED from Nephrology clinic for evaluation of Hyperkalemia.      # Hyperkalemia, K 6.6 on admit. EKG w/o peaked T waves.    -SP Dextrose and Insulin and Bicarb, Kayexalate and diuretics  -correct low mg, recheck today  -give additional 60 g kayexalate and repeat level at 1p  -initiate dialysis in patient after tunnel line placed.      # ESRD, CKD V: 2/2 biopsy proven diabetic nephropathy. Pt w/ solitary kidney s/p donation to brother in 1998.  Cr 4.80, BUN 44. BL Cr appears 3.0-4.0 over past 12 months. Appears hypervolemic on exam. No O2 use.    -plan to initiate dialysis inpatient after tunnel line place.   -not on diabetic medications and HgA1c Was 5.5 IN Jan.     #. Bone health: PTH high, ionized ca borderline low 4.2, Phosphorus 3.1.   -check Vit D level    # Hypervolemic on exam, some intermittent cough  -get CXR     # DMII: A1c 5.5 (1/2019). Diet controlled.  BG stable on admission.    - recheck A1c  - BG c  - May need insulin on admission id hyperglycemic  - Hypoglycemia protocol     # Hypoalbuminemia: albumin 1.6 on admission.   - from nephrotic range protein uria  - support nutrtion   # Anemia of chronic renal disease: Hgb 10.0, MCV 94. Ferritin is 286, Saturation index 34     # Hypocalcemia: Ca 7.2, ICal 4.2.  - Received 2g calcium gluconate in ED     # HTN: High, likely from volume. Needs dialysis  - Continue PTA norvasc, metoprolol, Hydralazine, lasix.     # COPD: No Home O2 use.Stable.   - Continue Ct PTA Spiriva, dulera, albuterol.    # Hypothyroidism: PTA synthroid.  - Continue Synthroid     #Tobacco Abuse: Current 5-6 cigarette/day smoker  - Recommend cessation  - Nicotine gum per patient  request       DVT Prophylaxis: Pneumatic Compression Devices, waiting for tunnel line  Code Status: Full Code     Disposition Plan     Expected discharge: 2 - 3 days, recommended to prior living arrangement once HD plan per       Gerri Zapata MD    Interval History   Poor appetite, intermittent cough, nausea    -Data reviewed today: I reviewed all new labs and imaging results over the last 24 hours. I personally reviewed no images or EKG's today.    Physical Exam   Temp: 97.6  F (36.4  C) Temp src: Oral BP: 166/73 Pulse: 71   Resp: 18 SpO2: 99 % O2 Device: None (Room air)    Vitals:    05/02/19 0540   Weight: 76.2 kg (168 lb)     Vital Signs with Ranges  Temp:  [97.3  F (36.3  C)-97.9  F (36.6  C)] 97.6  F (36.4  C)  Pulse:  [60-93] 71  Resp:  [18-20] 18  BP: ()/(68-97) 166/73  SpO2:  [97 %-100 %] 99 %  I/O last 3 completed shifts:  In: 50 [IV Piggyback:50]  Out: -     Constitutional:Awake, alert, cooperative, no apparent distress  Respiratory: Clear to auscultation bilaterally, no crackles or wheezing  Cardiovascular: Regular rate and rhythm, normal S1 and S2, and no murmur noted  GI: Normal bowel sounds, soft, non-distended, non-tender  Skin/Integumen:  Edema in legs and hand and back noted      Medications     dextrose       - MEDICATION INSTRUCTIONS -         amLODIPine  10 mg Oral Daily     atorvastatin  40 mg Oral Daily     ceFAZolin  2 g Intravenous Pre-Op/Pre-procedure x 1 dose     fluticasone-vilanterol  1 puff Inhalation Daily     furosemide  60 mg Oral Daily     hydrALAZINE  25 mg Oral BID     levothyroxine  200 mcg Oral QAM AC     metoprolol succinate ER  25 mg Oral Daily     senna-docusate  1 tablet Oral BID    Or     senna-docusate  2 tablet Oral BID     sodium bicarbonate  650 mg Oral BID     umeclidinium  1 puff Inhalation Daily       Data   Recent Labs   Lab 05/02/19  0645 05/02/19  0411 05/01/19  2154 05/01/19  1940 05/01/19  1753 05/01/19  1748 05/01/19  1320   WBC  --   --   --   --    --   --  7.0   HGB  --   --   --   --  10.5*  --  10.0*   MCV  --   --   --   --   --   --  94   PLT  --   --   --   --   --   --  216   INR  --   --   --   --   --  0.95  --    * 144  --   --  141  --  143   POTASSIUM 5.9* 6.0* 6.2* 6.2* 6.5* 6.4* 6.6*   CHLORIDE 123* 119*  --   --   --   --  119*   CO2 14* 21  --   --   --   --  19*   BUN 43* 45*  --   --   --   --  44*   CR 4.77* 4.74*  --   --   --   --  4.80*   ANIONGAP 8 5  --   --   --   --  6   FRANCES 7.0* 7.0*  --   --   --   --  7.2*   GLC 87 40*  --   --  73  --  82   ALBUMIN  --   --   --  1.2*  --   --  1.6*   PROTTOTAL  --   --   --  4.5*  --   --   --    BILITOTAL  --   --   --  0.1*  --   --   --    ALKPHOS  --   --   --  77  --   --   --    ALT  --   --   --  17  --   --   --    AST  --   --   --  21  --   --   --        No results found for this or any previous visit (from the past 24 hour(s)).

## 2019-05-02 NOTE — PLAN OF CARE
Discharge Planner OT   Patient plan for discharge: home with A from family  Current status: pt near baseline functionally, but fatigued and deconditioned. SBA-CGA for OOB transfers and toileting. VSS while on RA. Use of walker with mobility.  Barriers to return to prior living situation: deconditioning  Recommendations for discharge: home with A from family  Rationale for recommendations: Anticipate pt will be able to complete ADLs safely with A from family prn at time of discharge.        Entered by: Nilson Rick 05/02/2019 10:26 AM

## 2019-05-02 NOTE — PROCEDURES
Interventional Radiology Brief Post Procedure Note    Procedure: IR CVC TUNNEL PLACEMENT > 5 YRS OF AGE    Proceduralist: Edy Doan MD    Assistant: Donavan Nguyen MD and None    Time Out: Prior to the start of the procedure and with procedural staff participation, I verbally confirmed the patient s identity using two indicators, relevant allergies, that the procedure was appropriate and matched the consent or emergent situation, and that the correct equipment/implants were available. Immediately prior to starting the procedure I conducted the Time Out with the procedural staff and re-confirmed the patient s name, procedure, and site/side. (The Joint Commission universal protocol was followed.)  Yes    Medications   Medication Event Details Admin User Admin Time   ceFAZolin (ANCEF) intermittent infusion 2 g in 100 mL dextrose PRE-MIX Medication Given Dose: 2 g; Rate: 200 mL/hr; Route: Intravenous; Scheduled Time:  3:25 PM; Comment: Time started Cathy Gupta RN 5/2/2019  3:25 PM   midazolam (VERSED) injection 0.5-2 mg Medication Given Dose: 0.5 mg; Route: Intravenous Cathy Gupta RN 5/2/2019  3:35 PM   fentaNYL (PF) (SUBLIMAZE) injection 25-50 mcg Medication Given Dose: 25 mcg; Route: Intravenous Cathy Gupta RN 5/2/2019  3:35 PM   heparin Lock (1000 units/mL High concentration) 3,000 Units Medication Given by Other Dose: 2.5 mL; Route: Intracatheter; Scheduled Time:  4:15 PM; Comment: By Cathy Zhu RN 5/2/2019  4:03 PM   lidocaine 1 % 1-30 mL Medication Given by Other Dose: 25 mL; Route: Intradermal; Comment: By Cathy Zhu RN 5/2/2019  4:05 PM   heparin Lock (1000 units/mL High concentration) 3,000 Units Medication Given by Other Dose: 2.5 mL; Route: Intracatheter; Scheduled Time:  4:15 PM; Comment: By Cathy Zhu RN 5/2/2019  4:05 PM       Sedation: IR Nurse Monitored Care   Post Procedure Summary:  Prior to the start of the procedure  and with procedural staff participation, I verbally confirmed the patient s identity using two indicators, relevant allergies, that the procedure was appropriate and matched the consent or emergent situation, and that the correct equipment/implants were available. Immediately prior to starting the procedure I conducted the Time Out with the procedural staff and re-confirmed the patient s name, procedure, and site/side. (The Joint Commission universal protocol was followed.)  Yes       Sedatives: Fentanyl and Midazolam (Versed)    Vital signs, airway and pulse oximetry were monitored and remained stable throughout the procedure and sedation was maintained until the procedure was complete.  The patient was monitored by staff until sedation discharge criteria were met.    Patient tolerance: Patient tolerated the procedure well with no immediate complications.    Time of sedation in minutes: 30 Minutes minutes from beginning to end of physician one to one monitoring.          Findings: A dialysis catheter was placed. Ready to use.     Estimated Blood Loss: None    Fluoroscopy Time:  minute(s)    SPECIMENS: None    Complications: 1. None     Condition: Stable    Plan:   -Line is ready to use.     Comments: See dictated procedure note for full details.    Donavan Nguyen MD

## 2019-05-02 NOTE — PROGRESS NOTES
"   05/02/19 0909   Quick Adds   Type of Visit Initial Occupational Therapy Evaluation   Living Environment   Lives With grandchild(dari);child(dari), adult   Living Arrangements house   Home Accessibility stairs to enter home;stairs within home   Number of Stairs, Main Entrance 5   Number of Stairs, Within Home, Primary   (15)   Transportation Anticipated car, drives self;family or friend will provide   Living Environment Comment some family member is usually home to A prn   Self-Care   Usual Activity Tolerance fair   Current Activity Tolerance poor   Regular Exercise No   Equipment Currently Used at Home walker, rolling;cane, straight   Functional Level   Ambulation 1-->assistive equipment  (rolling walker)   Transferring 0-->independent   Toileting 0-->independent   Bathing 0-->independent  (Pt does sponge bathing \"to not get stuck in the tub\")   Dressing 0-->independent   Eating 0-->independent   Communication 0-->understands/communicates without difficulty   Swallowing 0-->swallows foods/liquids without difficulty   Cognition 0 - no cognition issues reported   Fall history within last six months no   Which of the above functional risks had a recent onset or change? none   General Information   Onset of Illness/Injury or Date of Surgery - Date 05/01/19   Referring Physician Xochitl Arthur PA-C   Patient/Family Goals Statement To return home and be Ind   Additional Occupational Profile Info/Pertinent History of Current Problem Kim Anne is a 73 year old female admitted on 5/1/2019. She has PMH of DMII, ESRD, HLD, COPD, Hypothyroidism, Tobacco abuse who presents to the ED from Nephrology clinic for evaluation of Hyperkalemia.    Precautions/Limitations no known precautions/limitations   Cognitive Status Examination   Orientation orientation to person, place and time   Level of Consciousness alert   Visual Perception   Visual Perception Wears glasses   Visual Perception Comments report some dizziness " and that her glasses make her head hurt   Sensory Examination   Sensory Quick Adds No deficits were identified   Pain Assessment   Patient Currently in Pain No   Integumentary/Edema   Integumentary/Edema Comments some nonpitting edema in [place] LEs and Mid UEs   Range of Motion (ROM)   ROM Comment WNL   Strength   Strength Comments WFL, some generalized weakness   Muscle Tone Assessment   Muscle Tone Comments WNL   Coordination   Upper Extremity Coordination No deficits were identified   Transfer Skill: Sit to Stand   Level of [place]: Sit/Stand stand-by assist   Bathing   Level of [place] stand-by assist   Upper Body Dressing   Level of [place]: Dress Upper Body stand-by assist   Lower Body Dressing   Level of [place]: Dress Lower Body stand-by assist   Toileting   Level of [place]: Toilet stand-by assist   Grooming   Level of [place]: Grooming stand-by assist   Instrumental Activities of Daily Living (IADL)   Previous Responsibilities   (family A with IADLs)   Activities of Daily Living Analysis   Impairments Contributing to Impaired Activities of Daily Living   (deconditioning)   General Therapy Interventions   Planned Therapy Interventions ADL retraining;progressive activity/exercise;home program guidelines   Clinical Impression   Criteria for Skilled Therapeutic Interventions Met yes, treatment indicated   OT Diagnosis decreased ADL I and tolerance   Influenced by the following impairments deconditioning   Assessment of Occupational Performance 1-3 Performance Deficits   Identified Performance Deficits leisure, home mgmt   Clinical Decision Making (Complexity) Low complexity   Therapy Frequency 5 times/wk   Predicted Duration of Therapy Intervention (days/wks) 5/9/19   Anticipated Equipment Needs at Discharge   (TBD)   Anticipated Discharge Disposition Home with Assist  (A from family prn)   Risks and Benefits of Treatment have been explained. Yes   Patient, Family & other staff in  "agreement with plan of care Yes   Clinical Impression Comments Pt may benefit from skilled OT to help increase ADL I and tolerance   [facility] AM-PAC TM \"6 Clicks\"   2016, Trustees of [facility], under license to [employer].  All rights reserved.   6 Clicks Short Forms Daily Activity Inpatient Short Form   [facility] AM-PAC  \"6 Clicks\" Daily Activity Inpatient Short Form   1. Putting on and taking off regular lower body clothing? 3 - A Little   2. Bathing (including washing, rinsing, drying)? 3 - A Little   3. Toileting, which includes using toilet, bedpan or urinal? 3 - A Little   4. Putting on and taking off regular upper body clothing? 4 - None   5. Taking care of personal grooming such as brushing teeth? 4 - None   6. Eating meals? 4 - None   Daily Activity Raw Score (Score out of 24.Lower scores equate to lower levels of function) 21   Total Evaluation Time   Total Evaluation Time (Minutes) 10     "

## 2019-05-02 NOTE — PROGRESS NOTES
Reason for Assessment:  Nutrition education regarding HD diet   Diet History: Obtained from grand daughter. Patient lives with extended family. Granddaughter does all the cooking. Not following any specific dietary guidelines. Patient usually consumes 2 meals per day, a late bkf and an early dinner.   Nutrition Diagnosis:  Food- and nutrition-related knowledge deficit r/t no previous knowledge of dialysis diet ( new HD)   Interventions:  Provided instruction on dialysis diet guidelines   Provided handouts ( low K+, Phos and K+ foods )   Goals:   Patient will verbalize understanding of the dietary guidelines   Follow-up:    Patient to ask any further nutrition-related questions before discharge.  In addition, pt may request outpatient RD appointment.    Sudhir Love RD/ROSI  Pager [phone]

## 2019-05-02 NOTE — CONSULTS
Patient is on IR schedule 5/2/2019 for a Image guided placement of large bore, double lumen, tunneled CVC.   Labs WNL for procedure.   Orders for NPO, scrubs and antibiotics have been entered.   Consent will be done prior to procedure.     Please [place] the [place] RN [place] [phone] for estimated time of procedure.     Case discussed with Dr. Doan, Dr. Zapata and Dr. Sheffield. Kim Anne is a 73 year old female admitted on 5/1/2019. She has PMH of DMII, ESRD, HLD, COPD, Hypothyroidism, Tobacco abuse who presents to the ED from Nephrology clinic for evaluation of Hyperkalemia and initiation of HD. Team is requesting tunneled line for this purpose. They do not have a request on laterality as fistula planning is in the very early stages. Team is working to bring potassium down and it is currently 5.9. She does have an EKG from yesterday that shows no changes from her norm. She has been hyperkalemic in the high 5s since November 2018.    Margret Oneill DNP, APRN  Interventional Radiology  Pager: [phone]

## 2019-05-02 NOTE — PROGRESS NOTES
Interventional Radiology Pre-Procedure Sedation Assessment   Time of Assessment: 3:01 PM    Expected Level: Moderate Sedation    Indication: Sedation is required for the following type of Procedure: Venous Access    Sedation and procedural consent: Risks, benefits and alternatives were discussed with Patient    PO Intake: Appropriately NPO for procedure    ASA Class: Class 2 - MILD SYSTEMIC DISEASE, NO ACUTE PROBLEMS, NO FUNCTIONAL LIMITATIONS.    Mallampati: Grade 2:  Soft palate, base of uvula, tonsillar pillars, and portion of posterior pharyngeal wall visible    Lungs: Lungs Clear with good breath sounds bilaterally    Heart: Normal heart sounds and rate    History and physical reviewed and no updates needed. I have reviewed the lab findings, diagnostic data, medications, and the plan for sedation. I have determined this patient to be an appropriate candidate for the planned sedation and procedure and have reassessed the patient IMMEDIATELY PRIOR to sedation and procedure.    Misha Feng MD
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
## 2019-05-02 NOTE — PROGRESS NOTES
Care Coordinator Progress Note    Admission Date/Time:  5/1/2019  Attending MD:  Gerri Zapata*    Data  Chart reviewed, discussed with interdisciplinary team.   Patient was admitted for:    Hyperkalemia  End stage renal disease (H)  Type 2 diabetes mellitus with hypertension and end stage renal disease not on dialysis (H)  Encounter for long-term (current) use of insulin (H).    Concerns with insurance coverage for discharge needs: None.  Current Living Situation: Patient lives with family.  Support System: Supportive and Involved  Services Involved: none  Transportation at Discharge: Family or friend will provide  Transportation to Medical Appointments:  - Name of caregiver: family and pt still drives independently   Transportation to Dialysis: pt reports family will take her  Barriers to Discharge: none      Coordination of Care     Addendum 5/3 @ 5814: [facility] Schedule change to TTS, run time to be determined. Dialysis Rx written by Neph Fellow - faxed w/ Neph dc summary, Hospitalist progress note from today, and all Hep B labs to Pallavi at accepting facility (Ph: [phone] Fax:  [phone]). Patient expected to dc over the weekend per discussion Neph Fellow Dr. Sheffield had with Pallavi at [facility] and was replayed to me. No further RNCC needs.    5/3:   Faxed CXR, Access report and first run sheet to [facility] Admissions. Need to fax Hep B panel and another two run sheets when they are available.    5/2:   D: Chart reviewed and plan of care discussed with Medical Team. Plan for patient to discharge when medically stable and outpatient dialysis has been set up and confirmed from accepting facility.    I/A: Met with patient at bedside to discuss home environment and support, and outpatient dialysis. Patient reports she lives with her family and they help her. Also pt reports that her family will drive her to/from dialysis.    Patient selected facility on [address] in [facility]; reports that one is nearest  to her home. Available paperwork gathered and faxed to [facility] beds [facility] (Ph: [phone] Fax: [phone]) today at 1054am.    Remaining required paperwork that needs to be faxed before [facility]missions will accept patient: Nephrology Consult note(s), Primary Medicine progress note(s), Hepatitis B panel (HBsAg, anti-HBs, anti-HBc), last 3 dialysis run sheets, and final dialysis orders from Nephrology.    P: Care Coordination will continue to follow.       Referrals: Provided patient with options for Dialysis Services.             Plan  Anticipated Discharge Date:  weekend  Anticipated Discharge Plan:  Home      .Laura Coulter RN, BSN, PHN  Medicine Care Coordinator  [facility]  Desk Phone: [phone]  Pager: [phone]    To contact Weekend RNCC, dial * [phone] and enter job code [phone] at prompt.   This pager can not be contacted by text page or outside line.

## 2019-05-02 NOTE — PROGRESS NOTES
TRANSITIONS OF CARE (KERRIE) LOG   KERRIE tasks should be completed by the CC within one (1) business day of notification of each transition. Follow up contact with member is required after return to their usual care setting.  Note:  If CC finds out about the transitions fifteen (15) days or more after the member has returned to their usual care setting, no KERRIE log is needed. However, the CC should check in with the member to discuss the transition process, any changes needed to the care plan and document it in a case note.    Member Name:  Kim Whittington [facility] Name:  [employer] Member ID#:    Product: [facility] [place] [facility] Care Coordinator Contact:  Batool Mai RN, N Agency/County/Care System: [facility]   Transition Communication Actions from Care Management Contact   Transition #1   Notification Date: 5/2/19 Transition Date:  5/1/19 Transition From: Clinic     Is this the member s usual care setting?               yes Transition To: Hospital, [facility]   Transition Type:  Unplanned  Reason for Admission/Comments:  ESRD    Left voicemail with VELASQUEZ Silva introducing self as CC with  partners.  Joi silva to reach out as needed and I will continue to follow hospital course in [facility].       Shared CC contact info, care plan/services with receiving setting--Date completed: 5/2/19   Notified PCP of transition--Date completed:  5/1/19     via  EMR   Transition #2   Transition #3  (if applicable)   Notification Date: 5/6/19        Transition To:  Home  Transition Date: 5/5/19     Transition Type:    Planned  Notified PCP -- Date completed: 5/5/19              Shared CC contact info, care plan/services with receiving setting or, if applicable, home care agency--Date completed:  5/6/19  *Complete additional tasks below, if this transition is a return to usual care setting.      Comments: Call placed to member to follow up post discharge.  Left voicemail requesting call back.     Second attempt to reach member.   "Member states she is doing \"okay\" at home.  CM reviews change in rx at time of hospital discharge.  Member was short and uninterested in speaking to CM. Member states self cares and house work is difficult but is not interested in a home visit. States her son and several grandchildren stay with her.  CM encouraged member to call as needed.         *Complete tasks below when the member is discharging TO their usual care setting within one (1) business day of notification.  For situations where the Care Coordinator is notified of the discharge prior to the date of discharge, the Care Coordinator must follow up with the member or designated representative to confirm that discharge actually occurred and discuss required KERRIE tasks as outlined in the KERRIE Instructions.  (This includes situations where it may be a  new  usual care setting for the member. (i.e., a community member who decides upon permanent nursing home placement following hospitalization and rehab).    Date completed: 5/8/19 Communicated with member or their designated representative about the following:  care transition process; about changes to the member s health status; plan of care updates; education about transitions and how to prevent unplanned transitions/readmissions  Four Pillars for Optimal Transition:    Check  Yes  - if the member, family member and/or SNF/facility staff manages the following:    If  No  provide explanation in the comments section.          [x]  Yes     []  No     Does the member have a follow-up appointment scheduled with primary care or specialist? (Mental health hospitalizations--the appt. should be w/in 7 days)   [x]  Yes     []  No     Can the member manage their medications or is there a system in place to manage medications (e.g. home care set-up)?         [x]  Yes     []  No     Can the member verbalize warning signs and symptoms to watch for and how to respond?         [x]  Yes     []  No     Does the member use a " Personal Health Care Record?  Check  Yes  if visit summary, discharge summary, and/or healthcare summary are being used as a PHR.                                                                                                                                                                                    [] Yes      [x] No      Have you updated the member s care plan?  If  No  provide explanation in comments.   Comments:

## 2019-05-02 NOTE — PROGRESS NOTES
Patient Name: Kim Anne  Medical Record Number: [id number]  Today's Date: 5/2/2019    Procedure: Placement of a tunneled dual lumen catheter for dialysis  Proceduralist: Dr Wendy Milton    Sedation start time: 1536  Sedation end time: 1615  Sedation medications administered: Versed 0.5 mg   Fentanyl 25 mcg  Total sedation time: 40 min  Sedation Notes: Moderate sedation tolerated well    Procedure start time: 1526  Puncture time: 1528  Procedure end time: 1622    Report given to: Dialysis RN  : No    Other Notes: Pt arrived to IR room 5 from . Consent reviewed. Pt denies any questions or concerns regarding procedure. Pt positioned supine, prepped and monitored per protocol. Palindrome 14.5 Greenlandic x 23 cm catheter inserted via RIJ by Dr Paula.  Catheter sutured, heparin locked and is ready to use.  Site cleansed and a dressing applied..Pt tolerated procedure without any noted complications. Pt transferred to Dialysis.

## 2019-05-02 NOTE — PROGRESS NOTES
D/I/A: Received consult for suspected pressure injury on coccyx. Assessed the area and noted blanchable erythema to BL upper buttocks (fleshy aspect). Coccyx/sacrum epidermis is intact without any erythema. No pressure injury detected. Able to shift weight from side to side. Educated pt on pressure injury, risk factors, and preventive measures. Verbalized understanding,     P. Continue to follow pressure injury prevention protocol. No further visit planned, will sign off.

## 2019-05-02 NOTE — PLAN OF CARE
"Time: 2948-3437  Reason for admission/Dx:   End stage renal disease (H) [N18.6]  Hyperkalemia [E87.5]   [Vitals]: /72   Pulse 56   Temp 97.4  F (36.3  C) (Axillary)   Resp 14   Ht 1.626 m (5' 4\")   Wt 76.3 kg (168 lb 4.8 oz)   SpO2 98%   BMI 28.89 kg/m    [Labs/Lactic]: WDL   [BG]   Glucose Values Bedside Glucose (mg/dl )  GLUCOSE   Latest Ref Rng & Units - 70 - 99 mg/dL   5/2/2019 -- 97   5/2/2019 -- 88   Some recent data might be hidden      [Electrolytes]:  Potassium (mmol/L)   Date Value   05/02/2019 5.8 (H)     Magnesium (mg/dL)   Date Value   05/02/2019 2.0      Phosphorus (mg/dL)   Date Value   05/01/2019 3.1      [Imaging]: Lung X-ray Completed today    Cardiac: WDL Tele:NSR. Generalized eedma +2. HTN controlled by scheduled meds. [Pain/PRN/s]: WDL, denies pain.  PRN labetalol for BP > 180   Respiraton: Inspiratory wheezes  [Lines]:PIV, CVC placed @1600  [Infusions]:N/A    Neuros:WDL, A&Ox4    [GI]  :Orders Placed This Encounter      Dialysis Diet   [Activity]: SBA with walker   []:WDL, infrequent      [Skin/Wounds]: Red, blanchable area on coccyx     Shift changes: CVC dialysis catheter was placed @ 1600. Patient on Dialysis. K+ 5.8.   Plan: Continue to monitor. Continue with POC.       "

## 2019-05-02 NOTE — PLAN OF CARE
Admit to 5a from UED at 0540. A&O. VSS on RA. HTN to 196/74. Telemetry in place, NSR. +2 edema to b/l arms and feet. NPO + meds. PIV infusing D10 at 50 ml/hr. BG stable at 90. Potassium of 6.0. Non-blanchable redness on coccyx, WOCN consult placed. Up with SBA + walker. Plan for HD line placement in IR today.

## 2019-05-02 NOTE — PLAN OF CARE
PT 5A:  Acknowledge PT consult.  Based up on chart review and discussion with OT, pt appears to be mobilizing very near her baseline with FWW.  C/o of fatigue, OT is addressing this.  Do not anticipate mobility barriers to safe discharge.  PT will reassess pt's case 5/3 and initiate services if indicated, but not anticipate need for this.

## 2019-05-03 ENCOUNTER — TELEPHONE (OUTPATIENT)
Dept: FAMILY MEDICINE | Facility: CLINIC | Age: 74
End: 2019-05-03

## 2019-05-03 ENCOUNTER — APPOINTMENT (OUTPATIENT)
Dept: OCCUPATIONAL THERAPY | Facility: CLINIC | Age: 74
DRG: 640 | End: 2019-05-03
Payer: COMMERCIAL

## 2019-05-03 LAB
ANION GAP SERPL CALCULATED.3IONS-SCNC: 7 MMOL/L (ref 3–14)
BUN SERPL-MCNC: 33 MG/DL (ref 7–30)
CALCIUM SERPL-MCNC: 6.7 MG/DL (ref 8.5–10.1)
CHLORIDE SERPL-SCNC: 111 MMOL/L (ref 94–109)
CO2 SERPL-SCNC: 20 MMOL/L (ref 20–32)
CREAT SERPL-MCNC: 3.91 MG/DL (ref 0.52–1.04)
ERYTHROCYTE [DISTWIDTH] IN BLOOD BY AUTOMATED COUNT: 16.6 % (ref 10–15)
GFR SERPL CREATININE-BSD FRML MDRD: 11 ML/MIN/{1.73_M2}
GLUCOSE BLDC GLUCOMTR-MCNC: 109 MG/DL (ref 70–99)
GLUCOSE BLDC GLUCOMTR-MCNC: 131 MG/DL (ref 70–99)
GLUCOSE BLDC GLUCOMTR-MCNC: 140 MG/DL (ref 70–99)
GLUCOSE BLDC GLUCOMTR-MCNC: 149 MG/DL (ref 70–99)
GLUCOSE SERPL-MCNC: 123 MG/DL (ref 70–99)
HBV CORE IGM SERPL QL IA: NONREACTIVE
HBV SURFACE AB SERPL IA-ACNC: 108.65 M[IU]/ML
HBV SURFACE AG SERPL QL IA: NONREACTIVE
HCT VFR BLD AUTO: 28.9 % (ref 35–47)
HCV AB SERPL QL IA: NONREACTIVE
HGB BLD-MCNC: 8.8 G/DL (ref 11.7–15.7)
MCH RBC QN AUTO: 28.4 PG (ref 26.5–33)
MCHC RBC AUTO-ENTMCNC: 30.4 G/DL (ref 31.5–36.5)
MCV RBC AUTO: 93 FL (ref 78–100)
PLATELET # BLD AUTO: 180 10E9/L (ref 150–450)
POTASSIUM SERPL-SCNC: 4.5 MMOL/L (ref 3.4–5.3)
RBC # BLD AUTO: 3.1 10E12/L (ref 3.8–5.2)
SODIUM SERPL-SCNC: 138 MMOL/L (ref 133–144)
WBC # BLD AUTO: 5.6 10E9/L (ref 4–11)

## 2019-05-03 PROCEDURE — 25000132 ZZH RX MED GY IP 250 OP 250 PS 637: Performed by: PHYSICIAN ASSISTANT

## 2019-05-03 PROCEDURE — 85027 COMPLETE CBC AUTOMATED: CPT | Performed by: HOSPITALIST

## 2019-05-03 PROCEDURE — 97535 SELF CARE MNGMENT TRAINING: CPT | Mod: GO

## 2019-05-03 PROCEDURE — 36415 COLL VENOUS BLD VENIPUNCTURE: CPT | Performed by: HOSPITALIST

## 2019-05-03 PROCEDURE — 25000132 ZZH RX MED GY IP 250 OP 250 PS 637: Performed by: INTERNAL MEDICINE

## 2019-05-03 PROCEDURE — 12000001 ZZH R&B MED SURG/OB UMMC

## 2019-05-03 PROCEDURE — 86803 HEPATITIS C AB TEST: CPT | Performed by: HOSPITALIST

## 2019-05-03 PROCEDURE — 00000146 ZZHCL STATISTIC GLUCOSE BY METER IP

## 2019-05-03 PROCEDURE — 80048 BASIC METABOLIC PNL TOTAL CA: CPT | Performed by: HOSPITALIST

## 2019-05-03 PROCEDURE — 86705 HEP B CORE ANTIBODY IGM: CPT | Performed by: HOSPITALIST

## 2019-05-03 PROCEDURE — 25000128 H RX IP 250 OP 636: Performed by: INTERNAL MEDICINE

## 2019-05-03 PROCEDURE — 99232 SBSQ HOSP IP/OBS MODERATE 35: CPT | Performed by: HOSPITALIST

## 2019-05-03 PROCEDURE — 90937 HEMODIALYSIS REPEATED EVAL: CPT

## 2019-05-03 PROCEDURE — 63400005 ZZH RX 634: Performed by: INTERNAL MEDICINE

## 2019-05-03 RX ORDER — DOXERCALCIFEROL 4 UG/2ML
1 INJECTION INTRAVENOUS
Status: COMPLETED | OUTPATIENT
Start: 2019-05-03 | End: 2019-05-03

## 2019-05-03 RX ADMIN — DOXERCALCIFEROL 1 MCG: 4 INJECTION, SOLUTION INTRAVENOUS at 16:32

## 2019-05-03 RX ADMIN — OXYCODONE HYDROCHLORIDE 10 MG: 5 TABLET ORAL at 07:46

## 2019-05-03 RX ADMIN — ACETAMINOPHEN 650 MG: 325 TABLET, FILM COATED ORAL at 07:46

## 2019-05-03 RX ADMIN — SENNOSIDES AND DOCUSATE SODIUM 1 TABLET: 8.6; 5 TABLET ORAL at 20:07

## 2019-05-03 RX ADMIN — FUROSEMIDE 60 MG: 20 TABLET ORAL at 10:04

## 2019-05-03 RX ADMIN — SODIUM CHLORIDE 250 ML: 9 INJECTION, SOLUTION INTRAVENOUS at 16:33

## 2019-05-03 RX ADMIN — Medication: at 16:33

## 2019-05-03 RX ADMIN — EPOETIN ALFA 2000 UNITS: 10000 SOLUTION INTRAVENOUS; SUBCUTANEOUS at 16:32

## 2019-05-03 RX ADMIN — ATORVASTATIN CALCIUM 40 MG: 40 TABLET, FILM COATED ORAL at 10:05

## 2019-05-03 RX ADMIN — UMECLIDINIUM 1 PUFF: 62.5 AEROSOL, POWDER ORAL at 10:03

## 2019-05-03 RX ADMIN — LEVOTHYROXINE SODIUM 200 MCG: 100 TABLET ORAL at 10:05

## 2019-05-03 RX ADMIN — SODIUM BICARBONATE 650 MG TABLET 650 MG: at 20:07

## 2019-05-03 RX ADMIN — FLUTICASONE FUROATE AND VILANTEROL TRIFENATATE 1 PUFF: 100; 25 POWDER RESPIRATORY (INHALATION) at 10:03

## 2019-05-03 RX ADMIN — HYDRALAZINE HYDROCHLORIDE 25 MG: 25 TABLET ORAL at 10:04

## 2019-05-03 RX ADMIN — SODIUM CHLORIDE 300 ML: 9 INJECTION, SOLUTION INTRAVENOUS at 16:32

## 2019-05-03 RX ADMIN — SENNOSIDES AND DOCUSATE SODIUM 1 TABLET: 8.6; 5 TABLET ORAL at 07:46

## 2019-05-03 RX ADMIN — SODIUM BICARBONATE 650 MG TABLET 650 MG: at 10:04

## 2019-05-03 RX ADMIN — VITAMIN D, TAB 1000IU (100/BT) 2000 UNITS: 25 TAB at 10:04

## 2019-05-03 RX ADMIN — METOPROLOL SUCCINATE 25 MG: 25 TABLET, EXTENDED RELEASE ORAL at 10:04

## 2019-05-03 RX ADMIN — AMLODIPINE BESYLATE 10 MG: 10 TABLET ORAL at 10:05

## 2019-05-03 ASSESSMENT — ACTIVITIES OF DAILY LIVING (ADL)
ADLS_ACUITY_SCORE: 16

## 2019-05-03 NOTE — PROGRESS NOTES
[facility], [place]    Hospitalist Progress Note    Date of Service (when I saw the patient): 05/03/2019    Assessment & Plan     Kim Anne is a 73 year old female admitted on 5/1/2019. She has PMH of DMII, ESRD, HLD, COPD, Hypothyroidism, Tobacco abuse who presents to the ED from Nephrology clinic for evaluation of Hyperkalemia.      # Hyperkalemia from ESRD, Resolved, now that she is on dialysis    -initially got dextrose, insulin, bicarb, keyaxalate  -renal diet    # ESRD, CKD V: 2/2 biopsy proven diabetic nephropathy. Pt w/ solitary kidney s/p donation to brother in 1998.  Cr 4.80, BUN 44. BL Cr appears 3.0-4.0 over past 12 months. was hypervolemic on exam. No O2 use.    -started on dialysis inpatient after tunnel line place.   -not on diabetic medications and HgA1c Was 5.5 IN Jan.     #. Bone health: PTH high, ionized ca borderline low 4.2, Phosphorus 3.1.   -low Vit D level  -start oral supplements  -getting hectoral with dialysis    # Hypervolemic on exam, some intermittent cough  -CXR showed mild BL pleural effusion     # DMII: A1c 5.2 (1/2019). Diet controlled.  BG stable on admission.    - BG c are well controlled.     # Hypoalbuminemia: albumin 1.6 on admission.   - from nephrotic range protein uria  - support nutrtion     # Anemia of chronic renal disease: Hgb 10.0, MCV 94. Ferritin is 286, Saturation index 34  -getting Epo with HD  -good iron stores     # Hypocalcemia: Ca 7.2, ICal 4.2.  - Received 2g calcium gluconate in ED  - started on Vit D and getting hectoral with HD     # HTN: Was high prior to HD but now low  - Stop norvasc, Hydralazine and metoprolo. Keep lasix.      # COPD: No Home O2 use.Stable.   - Continue Ct PTA Spiriva, dulera, albuterol.    # Hypothyroidism: PTA synthroid.  - Continue Synthroid     #Tobacco Abuse: Current 5-6 cigarette/day smoker  - Recommend cessation  - Nicotine gum per patient request       DVT Prophylaxis: Pneumatic Compression  Devices, waiting for tunnel line  Code Status: Full Code     Disposition Plan     Expected discharge tomorrow after dialsysi, recommended to prior living arrangement once HD plan per       Gerri Zapata MD    Interval History     Feels better. Nausea is better.     -Data reviewed today: I reviewed all new labs and imaging results over the last 24 hours. I personally reviewed no images or EKG's today.    Physical Exam   Temp: 98.1  F (36.7  C) Temp src: Oral BP: 104/51 Pulse: 55 Heart Rate: 55 Resp: 14 SpO2: 97 % O2 Device: None (Room air)    Vitals:    05/02/19 0540 05/02/19 1030   Weight: 76.2 kg (168 lb) 76.3 kg (168 lb 4.8 oz)     Vital Signs with Ranges  Temp:  [96.9  F (36.1  C)-98.3  F (36.8  C)] 98.1  F (36.7  C)  Pulse:  [49-63] 55  Heart Rate:  [50-75] 55  Resp:  [14-16] 14  BP: ()/(42-86) 104/51  Cuff Mean (mmHg):  [] 63  SpO2:  [93 %-100 %] 97 %  I/O last 3 completed shifts:  In: 189.17 [I.V.:189.17]  Out: 300 [Other:300]    Constitutional:Awake, alert, cooperative, no apparent distress  Respiratory: Clear to auscultation bilaterally, no crackles or wheezing  Cardiovascular: Regular rate and rhythm, normal S1 and S2, and no murmur noted  GI: Normal bowel sounds, soft, non-distended, non-tender  Skin/Integumen:  Edema in legs and hand and back noted      Medications       sodium chloride 0.9%  250 mL Intravenous Once in dialysis     sodium chloride 0.9%  300 mL Hemodialysis Machine Once     amLODIPine  10 mg Oral Daily     atorvastatin  40 mg Oral Daily     doxercalciferol  1 mcg Intravenous Once in dialysis     epoetin jacobo (EPOGEN,PROCRIT) inj ESRD  2,000 Units Intravenous Once in dialysis     fluticasone-vilanterol  1 puff Inhalation Daily     furosemide  60 mg Oral Daily     gelatin absorbable  1 each Topical During Hemodialysis (from stock)     gelatin absorbable  1 each Topical During Hemodialysis (from stock)     hydrALAZINE  25 mg Oral BID     levothyroxine  200 mcg Oral QAM AC      metoprolol succinate ER  25 mg Oral Daily     - MEDICATION INSTRUCTIONS -   Does not apply Once     senna-docusate  1 tablet Oral BID    Or     senna-docusate  2 tablet Oral BID     sodium bicarbonate  650 mg Oral BID     sodium chloride (PF)  9 mL Intracatheter During Hemodialysis (from stock)     sodium chloride (PF)  9 mL Intracatheter During Hemodialysis (from stock)     umeclidinium  1 puff Inhalation Daily     cholecalciferol  2,000 Units Oral Daily       Data   Recent Labs   Lab 05/03/19  0625 05/02/19  1405 05/02/19  0645 05/02/19  0411  05/01/19  1940 05/01/19  1753 05/01/19  1748 05/01/19  1320   WBC 5.6  --   --   --   --   --   --   --  7.0   HGB 8.8*  --   --   --   --   --  10.5*  --  10.0*   MCV 93  --   --   --   --   --   --   --  94     --   --   --   --   --   --   --  216   INR  --   --   --   --   --   --   --  0.95  --      --  145* 144  --   --  141  --  143   POTASSIUM 4.5 5.8* 5.9* 6.0*   < > 6.2* 6.5* 6.4* 6.6*   CHLORIDE 111*  --  123* 119*  --   --   --   --  119*   CO2 20  --  14* 21  --   --   --   --  19*   BUN 33*  --  43* 45*  --   --   --   --  44*   CR 3.91*  --  4.77* 4.74*  --   --   --   --  4.80*   ANIONGAP 7  --  8 5  --   --   --   --  6   FRANCES 6.7*  --  7.0* 7.0*  --   --   --   --  7.2*   *  --  87 40*  --   --  73  --  82   ALBUMIN  --   --   --   --   --  1.2*  --   --  1.6*   PROTTOTAL  --   --   --   --   --  4.5*  --   --   --    BILITOTAL  --   --   --   --   --  0.1*  --   --   --    ALKPHOS  --   --   --   --   --  77  --   --   --    ALT  --   --   --   --   --  17  --   --   --    AST  --   --   --   --   --  21  --   --   --     < > = values in this interval not displayed.       Recent Results (from the past 24 hour(s))   IR CVC Tunnel Placement > 5 Yrs of Age    Narrative    PRE-PROCEDURE DIAGNOSIS: End-stage renal disease    POST-PROCEDURE DIAGNOSIS: Same    PROCEDURE: Tunneled central venous catheter placement    Impression    IMPRESSION:  Completed image-guided placement of 14.5 Polish, 23 cm  double lumen tunneled central venous catheter via right internal  jugular vein. Catheter tip in high right atrium. Aspirates and flushes  freely, heparin locked and ready for immediate use. No complication.     CLINICAL HISTORY: End-stage renal disease, patient to start  hemodialysis later today. Tunneled central venous catheter placement  requested.    PERFORMED BY: Dr. Edy Doan    Resident: Misha Feng     Fellow: Donavan Nguyen    CONSENT: Written informed consent was obtained and is documented in  the patient record.    MEDICATIONS: 1. 0.5 mg midazolam IV  2. 25 mcg fentanyl IV  3. 25 mL 1% lidocaine for local anesthesia  4. 200 units heparin per lumen    NURSING: The patient was placed on continuous vital signs monitoring.  Vital signs and sedation were monitored by nursing staff under IR  physician staff supervision.      SEDATION TIME: 40 minutes face-to-face    FLUOROSCOPY TIME: .8 minutes    DESCRIPTION: The right neck and upper chest were prepped and draped in  the usual sterile fashion.      Under ultrasound guidance, the right internal jugular vein was  identified and the overlying skin was anesthetized and skin  dermatotomy was made. Under ultrasound guidance, right internal  jugular venipuncture was made with needle. Image saved documenting  venipuncture and patency.    Needle was exchanged over guidewire for a dilator under fluoroscopic  guidance. Length to right atrium was measured with guidewire.  Guidewire and inner dilator were removed. Wire was advanced into  inferior vena cava under fluoroscopic guidance and secured.     The anterior chest skin was anesthetized and incision was made after  measurements were taken. A cuffed catheter was subcutaneously tunneled  from the anterior chest incision to the internal jugular venipuncture  site after path of tunnel was anesthetized. The dilator was exchanged  over guidewire for a peel-away  sheath. Guidewire was removed. Under  fluoroscopic guidance, the catheter was placed through the peel-away  sheath. Peel-away sheath was removed.      Final catheter position saved. Both catheter lumens adequately  aspirated and flushed. Each lumen was heparin locked. A catheter  retaining suture and sterile dressing were applied. The skin  dermatotomy site overlying the internal jugular venipuncture was  closed with topical adhesive.    COMPLICATIONS: No immediate concerns, the patient remained stable  throughout the procedure and tolerated it well.    ESTIMATED BLOOD LOSS: Minimal    SPECIMENS: None

## 2019-05-03 NOTE — PLAN OF CARE
Pt has great po intake today, slightly hypotensive this annette prior to HD, pt had received morning antihypertensives after had called to clarify this was o.k. With dialysis to give but pt BP low prior to HD this afternoon, updated HD nurse on this, BG WNL, other VSS except matthew at times in 50's, on tele, pt is alert, orientated with flat affect, no BM today, small UOP this a.m.

## 2019-05-03 NOTE — PLAN OF CARE
Discharge Planner OT   Patient plan for discharge: home with A from family  Current status: Pt completes mobility, transfers and ambulation with SBA and walker, c/o fatigue and poor activity tolerance.  Barriers to return to prior living situation: deconditioning  Recommendations for discharge: Per plan established by the OT, the recommendation for dc location is home with A from family  Rationale for recommendations: Anticipate pt will be able to complete ADLs safely with A from family prn at time of discharge

## 2019-05-03 NOTE — PROGRESS NOTES
Nephrology Progress Note  05/03/2019         Assessment & Recommendations:     Kim Anne is a 73 year old female with a hx of solitary kidney post donation to her brother 1988, Bx proven DM nephropathy from 2/2015 , CKD 5 now possibly ESKD , hypothyroidism , COPD , HTN , admitted from the clinic after a RRT was called for hyperkalemia to 6.6 , Nephrology consulted for management of her ESKD and hyperkalemia     ESKD with initiating dialysis today  Creatinine at 4.7 with GFR ~8 with >10g/g of albuminuria  Bx 2015 with DM nephropathy  Kidney function has been slowly declining  She has uremic symptoms with fatigue , tremors , poor appetite and asterixis  HD initiated 5/2/19  -- tolerating 2nd treatment well, plan for IHD tomorrow and then can be discharged to follow Dr Liz [facility] on tuesday  -- Next treatment tomorrow  -- labs daily with daily weights  -- continue on home diuretics for volume management     Hyperkalemia  improved  -- keep on low potassium diet  Mild hypernatremia and hypocalcemia (corrected calcium wnl)  -- follow on daily labs     Mild hypervolemia and hx of HTN  PTA on amlodipine 10 , furosemide 40mg bid , hydralazine 25mg BiD and metoprolol 25 daily  -- will continue on home meds for now , increase lasix to 80mg BiD  -- goal /80s     Anemia   ACD/inflamm  Iron replete 34% Isat, ferritin 286 , Iron 48 on 5/1/19,   -- EPO dosed at 2000U today     MBD    Hypocalcemia and phos wnl  Vit D 15  -- Hectorol dosed at 1mcg with IHD, she ran on K3 Ca3 bath  -- Re-started on PO 2000U D3     DM 2 on Insulin      Recommendations were communicated to primary team     Seen and discussed with Dr. Mehran Sheffield MD   [phone]    Interval History :   Nursing and provider notes from last 24 hours reviewed.  In the last 24 hours Kim Anne has been stable and tolerating IHD well.    Review of Systems:   I reviewed the following systems:  GI: + appetite. -  "nausea or vomiting or diarrhea.   Neuro:  mild confusion  Constitutional:  - fever or chills  CV: - dyspnea or edema.  - chest pain.    Physical Exam:   I/O last 3 completed shifts:  In: 189.17 [I.V.:189.17]  Out: 300 [Other:300]   BP 99/52   Pulse 54   Temp 98.1  F (36.7  C) (Oral)   Resp 14   Ht 1.626 m (5' 4\")   Wt 76.3 kg (168 lb 4.8 oz)   SpO2 98%   BMI 28.89 kg/m       GENERAL APPEARANCE: NAD  EYES:  - scleral icterus, pupils equal  HENT: mouth without ulcers or lesions  PULM: lungs clear to auscultation bilaterally, equal air movement, no clubbing  CV: regular rhythm, normal rate, no rub     -JVD -     -edema trace   GI: soft, nontender,  Non distended, bowel sounds are +  INTEGUMENT: no cyanosis, - rash  NEURO:  mild asterixis   Access RIJ tunneled placed 5/2/19    Labs:   All labs reviewed by me  Electrolytes/Renal -   Recent Labs   Lab Test 05/03/19  0625 05/02/19  1405 05/02/19  0645 05/02/19  0411  05/01/19  1940  05/01/19  1320  11/16/18  0947 08/03/18  0859  09/11/17  0928     --  145* 144  --   --    < > 143   < > 142 144   < > 145*   POTASSIUM 4.5 5.8* 5.9* 6.0*   < > 6.2*   < > 6.6*   < > 5.5* 4.9   < > 4.6   CHLORIDE 111*  --  123* 119*  --   --   --  119*   < > 116* 116*   < > 116*   CO2 20  --  14* 21  --   --   --  19*   < > 17* 19*   < > 20   BUN 33*  --  43* 45*  --   --   --  44*   < > 42* 37*   < > 26   CR 3.91*  --  4.77* 4.74*  --   --   --  4.80*   < > 5.43* 3.88*   < > 2.76*   *  --  87 40*  --   --    < > 82   < > 85 94   < > 100*   FRANCES 6.7*  --  7.0* 7.0*  --   --   --  7.2*   < > 7.1* 7.7*   < > 8.1*   MAG  --   --  2.0  --   --  1.5*  --   --   --   --   --   --  1.7   PHOS  --   --   --   --   --   --   --  3.1  --  5.2* 4.7*   < > 4.1    < > = values in this interval not displayed.       CBC -   Recent Labs   Lab Test 05/03/19  0625 05/01/19  1753 05/01/19  1320 04/03/19  1202   WBC 5.6  --  7.0 7.4   HGB 8.8* 10.5* 10.0* 11.2*     --  216 182       LFTs " -   Recent Labs   Lab Test 05/01/19  1940 05/01/19  1320 11/16/18  0947  10/25/17  0639  09/07/17  1135   ALKPHOS 77  --   --   --  164*  --  158*   BILITOTAL 0.1*  --   --   --  0.2  --  0.2   ALT 17  --   --   --  16  --  18   AST 21  --   --   --  13  --  19   PROTTOTAL 4.5*  --   --   --  5.8*  --  5.7*   ALBUMIN 1.2* 1.6* 1.5*   < > 1.9*   < > 1.9*    < > = values in this interval not displayed.       Iron Panel -   Recent Labs   Lab Test 05/01/19  1320 02/16/18  0945 09/11/17  0928   IRON 48 46 73   IRONSAT 34 28 43   * 134 127         Imaging:  All imaging studies reviewed by me.     Current Medications:    sodium chloride 0.9%  250 mL Intravenous Once in dialysis     sodium chloride 0.9%  300 mL Hemodialysis Machine Once     amLODIPine  10 mg Oral Daily     atorvastatin  40 mg Oral Daily     doxercalciferol  1 mcg Intravenous Once in dialysis     epoetin jacobo (EPOGEN,PROCRIT) inj ESRD  2,000 Units Intravenous Once in dialysis     fluticasone-vilanterol  1 puff Inhalation Daily     furosemide  60 mg Oral Daily     gelatin absorbable  1 each Topical During Hemodialysis (from stock)     gelatin absorbable  1 each Topical During Hemodialysis (from stock)     hydrALAZINE  25 mg Oral BID     levothyroxine  200 mcg Oral QAM AC     metoprolol succinate ER  25 mg Oral Daily     - MEDICATION INSTRUCTIONS -   Does not apply Once     senna-docusate  1 tablet Oral BID    Or     senna-docusate  2 tablet Oral BID     sodium bicarbonate  650 mg Oral BID     sodium chloride (PF)  9 mL Intracatheter During Hemodialysis (from stock)     sodium chloride (PF)  9 mL Intracatheter During Hemodialysis (from stock)     umeclidinium  1 puff Inhalation Daily     cholecalciferol  2,000 Units Oral Daily       Yola Sheffield MD

## 2019-05-03 NOTE — DISCHARGE SUMMARY
Dialysis Discharge Summary Brief    [facility]  Division of Nephrology  Nephrology Discharge Dialysis Orders  Ph: [phone]  Fax: [phone]    Kim Chavez  MRN: [id number]  YOB: 1945    [facility]: [facility]  Primary Nephrologist: Dr Liz    Date of Admission: 5/1/2019  Date of Discharge: 5/4/2019    Discharge Diagnosis:  Kim Chavez is a 73 year old female with a hx of solitary kidney post donation to her brother 1988, Bx proven DM nephropathy from 2/2015 , CKD 5 now possibly ESKD , hypothyroidism , COPD , HTN , admitted from the clinic after a RRT was called for hyperkalemia to 6.6 , Nephrology consulted for management of her ESKD and hyperkalemia     ESKD with initiating dialysis today  Creatinine at 4.7 with GFR ~8 with >10g/g of albuminuria  Bx 2015 with DM nephropathy  Kidney function has been slowly declining  She has uremic symptoms with fatigue , tremors , poor appetite and asterixis  HD initiated 5/2/19  -- tolerating 2nd treatment well, plan for IHD tomorrow and then can be discharged to follow Dr Liz at [facility] on tuesday  -- Next treatment tomorrow  -- labs daily with daily weights  -- continue on home diuretics for volume management     Hyperkalemia  improved  -- keep on low potassium diet  Mild hypernatremia and hypocalcemia (corrected calcium wnl)  -- follow on daily labs     Mild hypervolemia and hx of HTN  PTA on amlodipine 10 , furosemide 40mg bid , hydralazine 25mg BiD and metoprolol 25 daily  -- will continue on home meds for now , increase lasix to 80mg BiD  -- goal /80s     Anemia   ACD/inflamm  Iron replete 34% Isat, ferritin 286 , Iron 48 on 5/1/19,   -- EPO dosed at 2000U today     MBD    Hypocalcemia and phos wnl  Vit D 15  -- Hectorol dosed at 1mcg with IHD, she ran on K3 Ca3 bath  -- Re-started on PO 2000U D3     DM 2 on Insulin    Results for KIM CHAVEZ (MRN  [id number]) as of 5/3/2019 15:52   Ref. Range 4/3/2019 12:02 5/1/2019 13:20 5/1/2019 17:48 5/1/2019 17:53 5/1/2019 19:40 5/1/2019 21:54 5/2/2019 04:11 5/2/2019 06:45 5/2/2019 14:05 5/3/2019 06:25   Sodium Latest Ref Range: 133 - 144 mmol/L 144 143  141   144 145 (H)  138   Potassium Latest Ref Range: 3.4 - 5.3 mmol/L 5.8 (H) 6.6 (HH) 6.4 (HH) 6.5 (HH) 6.2 (HH) 6.2 (HH) 6.0 (H) 5.9 (H) 5.8 (H) 4.5   Chloride Latest Ref Range: 94 - 109 mmol/L 119 (H) 119 (H)     119 (H) 123 (H)  111 (H)   Carbon Dioxide Latest Ref Range: 20 - 32 mmol/L 15 (L) 19 (L)     21 14 (L)  20   Urea Nitrogen Latest Ref Range: 7 - 30 mg/dL 37 (H) 44 (H)     45 (H) 43 (H)  33 (H)   Creatinine Latest Ref Range: 0.52 - 1.04 mg/dL 5.38 (H) 4.80 (H)     4.74 (H) 4.77 (H)  3.91 (H)   GFR Estimate Latest Ref Range: >60 mL/min/1.73_m2 7 (L) 8 (L)     8 (L) 8 (L)  11 (L)   GFR Estimate If Black Latest Ref Range: >60 mL/min/1.73_m2 8 (L) 10 (L)     10 (L) 10 (L)  12 (L)   Calcium Latest Ref Range: 8.5 - 10.1 mg/dL 7.5 (L) 7.2 (L)     7.0 (L) 7.0 (L)  6.7 (L)   Anion Gap Latest Ref Range: 3 - 14 mmol/L 10 6     5 8  7   Magnesium Latest Ref Range: 1.6 - 2.3 mg/dL     1.5 (L)   2.0     Phosphorus Latest Ref Range: 2.5 - 4.5 mg/dL  3.1           Albumin Latest Ref Range: 3.4 - 5.0 g/dL  1.6 (L)   1.2 (L)        Protein Total Latest Ref Range: 6.8 - 8.8 g/dL     4.5 (L)        Bilirubin Total Latest Ref Range: 0.2 - 1.3 mg/dL     0.1 (L)        Alkaline Phosphatase Latest Ref Range: 40 - 150 U/L     77        ALT Latest Ref Range: 0 - 50 U/L     17        AST Latest Ref Range: 0 - 45 U/L     21        Hemoglobin A1C Latest Ref Range: 0 - 5.6 %   5.2          Bilirubin Direct Latest Ref Range: 0.0 - 0.2 mg/dL     <0.1        Calcium Ionized Latest Ref Range: 4.4 - 5.2 mg/dL    4.2 (L)         Creatinine Urine Latest Units: mg/dL  106           Ferritin Latest Ref Range: 8 - 252 ng/mL  286 (H)           Iron Latest Ref Range: 35 - 180 ug/dL  48           Iron  Binding Cap Latest Ref Range: 240 - 430 ug/dL  140 (L)           Iron Saturation Index Latest Ref Range: 15 - 46 %  34           Protein Random Urine Latest Units: g/L  9.37           Protein Total Urine g/gr Creatinine Latest Ref Range: 0 - 0.2 g/g Cr  8.84 (H)           Vitamin D Deficiency screening Latest Ref Range: 20 - 75 ug/L  15 (L)     12 (L)          Results for JONEL CHAVEZ (MRN [id number]) as of 5/3/2019 15:52   Ref. Range 2/16/2018 09:45 8/3/2018 08:59 5/1/2019 13:20   Parathyroid Hormone Intact Latest Ref Range: 18 - 80 pg/mL 536 (H) 486 (H) 692 (H)           ICD-10-CM    1. Hyperkalemia E87.5 UA with Microscopic reflex to Culture     Potassium     INR     ISTAT gases elec ica gluc tyron POCT     ISTAT gases elec ica gluc tyron POCT     Potassium     Glucose by meter     Glucose by meter     Magnesium     Magnesium     Glucose by meter     Glucose by meter     Potassium     Hepatic panel     Hepatic panel     Glucose by meter     Glucose by meter     Glucose by meter     Glucose by meter     Glucose by meter     Glucose by meter     Basic metabolic panel     Glucose by meter     Glucose by meter     Glucose by meter     Glucose by meter     Basic metabolic panel     Glucose by meter     Glucose by meter     Glucose by meter     Glucose by meter     Hemoglobin A1c     Hemoglobin A1c     Potassium     Glucose by meter     Glucose by meter     Magnesium     Magnesium     Vitamin D Deficiency     Glucose by meter     Glucose by meter     Vitamin D Deficiency     Vitamin D Deficiency     Hepatitis B Surface Antibody     Hepatitis B surface antigen     Glucose by meter     Glucose by meter     Glucose by meter     Glucose by meter     Hepatitis B core antibody IgM     Hepatits C antibody     Basic metabolic panel     CBC with platelets     Glucose by meter     Glucose by meter     Glucose by meter     Glucose by meter     CANCELED: Magnesium     CANCELED: Hepatic panel     CANCELED: Hemoglobin A1c      CANCELED: Magnesium     CANCELED: Vitamin D     CANCELED: Vitamin D Deficiency   2. End stage renal disease (H) N18.6    3. Type 2 diabetes mellitus with hypertension and end stage renal disease not on dialysis (H) E11.22     I12.0     N18.6    4. Encounter for long-term (current) use of insulin (H) Z79.4        [X] New initiation, new dialysis orders will be faxed.        New Orders (if not applicable put NA):  Estimated Dry Weight Unknown , euvolemic possibly ~75kg   Dialysis Duration 3hrs   Dialysis Access RIJ tunneled   Antibiotics (dose per dialysis, end date) NA           Labs to be drawn at dialysis Per protocol   Other major changes to dialysis prescription (e.g. Dialysate bath, heparin, blood flow rate, etc)   NA   Medication changes (also fax the unit a copy of the discharge summary)         Per discharge summary     Name of physician completing this form: Yola Sheffield MD, MD

## 2019-05-03 NOTE — PROGRESS NOTES
HEMODIALYSIS TREATMENT NOTE    Date: 5/2/2019  Time: 7:09 PM    Data:  Pre Wt: 76.3 kg    Desired Wt: 76.3 kg   Post Wt:  Requested from PCN  Ultrafiltration - Post Run Net Total Removed (mL): 300 mL  Ultrafiltration - Post Run Net Total Gain (mL): 0 mL  Vascular Access Status: Yes, secured and visible  Dialyzer Rinse: Streaked, Light  Total Blood Volume Processed: 23.7  Total Dialysis (Treatment) Time: 2 hrs     Lab:   Yes, Hep B status    Interventions/Assessment:  72 yo HD for 2 hrs initiation; no fluid removed. CVC patent, , saline locked/clear guard caps. See MAR for medications. Report given to PCN; new weight requested.      Plan:    Per renal team.

## 2019-05-03 NOTE — PLAN OF CARE
Time: 3725-2109    Reason for admission: hyperkalemia  Vitals: VSS on RA, intermittently bradycardic  Activity: assist x1 to BSC  Pain: denies  Neuro: A&Ox4  Cardiac: intermittently bradycardic, HR 58-80's, on cardiac tele.  Respiratory: LS coarse, inspiratory wheezes, current everyday smoker.  ARAUZ.    GI/: oliguric, on HD.  Last BM 5/2  Diet: CHO diet, moderate carb.  Tolerating well  Lines: R PIV SL, CVC for HD SL.  Scant blood on dressing.  Skin: blanchable redness on coccyx  Labs: K 5.8, will recheck in AM  New this shift:  PT returned from dialysis at 1920.  No fluid removed.    Plan: discharge to home when HD plan is in place and electrolytes stable.  Will continue to monitor and follow POC.

## 2019-05-03 NOTE — PROGRESS NOTES
HEMODIALYSIS TREATMENT NOTE    Date: 5/3/2019  Time: 5:58 PM    Data:  Pre Wt:  76.3 kg   Desired Wt: 76.3 kg   Post Wt:  Requested from PCN  Ultrafiltration - Post Run Net Total Removed (mL): 300 mL  Ultrafiltration - Post Run Net Total Gain (mL): 0 mL  Vascular Access Status: Yes, secured and visible  Dialyzer Rinse: Streaked, Light  Total Blood Volume Processed: 43.3  Total Dialysis (Treatment) Time:  2.5 hrs    Lab:   No    Interventions/Assessment:  72 yo HD 2.5 hrs; no fluid removed. CVC blood soaked, new dressing CDI, ; saline locked/clear guard caps. Pt tolerated tx well. See MAR for medications. Report given to PCN, new weight requested.     Plan:    Per renal team.

## 2019-05-04 LAB
GLUCOSE BLDC GLUCOMTR-MCNC: 104 MG/DL (ref 70–99)
GLUCOSE BLDC GLUCOMTR-MCNC: 109 MG/DL (ref 70–99)
GLUCOSE BLDC GLUCOMTR-MCNC: 133 MG/DL (ref 70–99)
GLUCOSE BLDC GLUCOMTR-MCNC: 74 MG/DL (ref 70–99)
GLUCOSE BLDC GLUCOMTR-MCNC: 99 MG/DL (ref 70–99)

## 2019-05-04 PROCEDURE — 25000132 ZZH RX MED GY IP 250 OP 250 PS 637: Performed by: PHYSICIAN ASSISTANT

## 2019-05-04 PROCEDURE — 12000001 ZZH R&B MED SURG/OB UMMC

## 2019-05-04 PROCEDURE — 99207 ZZC CDG-CUT & PASTE-POTENTIAL IMPACT ON LEVEL: CPT | Performed by: HOSPITALIST

## 2019-05-04 PROCEDURE — 99232 SBSQ HOSP IP/OBS MODERATE 35: CPT | Performed by: HOSPITALIST

## 2019-05-04 PROCEDURE — 25000132 ZZH RX MED GY IP 250 OP 250 PS 637: Performed by: INTERNAL MEDICINE

## 2019-05-04 PROCEDURE — 25000128 H RX IP 250 OP 636: Performed by: INTERNAL MEDICINE

## 2019-05-04 PROCEDURE — 90937 HEMODIALYSIS REPEATED EVAL: CPT

## 2019-05-04 PROCEDURE — 25000132 ZZH RX MED GY IP 250 OP 250 PS 637: Performed by: HOSPITALIST

## 2019-05-04 PROCEDURE — 00000146 ZZHCL STATISTIC GLUCOSE BY METER IP

## 2019-05-04 RX ORDER — HYDROXYZINE HYDROCHLORIDE 10 MG/1
10 TABLET, FILM COATED ORAL 3 TIMES DAILY PRN
Status: DISCONTINUED | OUTPATIENT
Start: 2019-05-04 | End: 2019-05-05 | Stop reason: HOSPADM

## 2019-05-04 RX ADMIN — FUROSEMIDE 60 MG: 20 TABLET ORAL at 07:51

## 2019-05-04 RX ADMIN — SENNOSIDES AND DOCUSATE SODIUM 2 TABLET: 8.6; 5 TABLET ORAL at 19:32

## 2019-05-04 RX ADMIN — VITAMIN D, TAB 1000IU (100/BT) 2000 UNITS: 25 TAB at 07:51

## 2019-05-04 RX ADMIN — SODIUM CHLORIDE 250 ML: 9 INJECTION, SOLUTION INTRAVENOUS at 10:00

## 2019-05-04 RX ADMIN — SENNOSIDES AND DOCUSATE SODIUM 2 TABLET: 8.6; 5 TABLET ORAL at 07:51

## 2019-05-04 RX ADMIN — ATORVASTATIN CALCIUM 40 MG: 40 TABLET, FILM COATED ORAL at 07:51

## 2019-05-04 RX ADMIN — SODIUM BICARBONATE 650 MG TABLET 650 MG: at 19:32

## 2019-05-04 RX ADMIN — HYDROXYZINE HYDROCHLORIDE 10 MG: 10 TABLET ORAL at 07:51

## 2019-05-04 RX ADMIN — ACETAMINOPHEN 650 MG: 325 TABLET, FILM COATED ORAL at 02:42

## 2019-05-04 RX ADMIN — Medication: at 10:00

## 2019-05-04 RX ADMIN — LEVOTHYROXINE SODIUM 200 MCG: 100 TABLET ORAL at 07:51

## 2019-05-04 RX ADMIN — SODIUM CHLORIDE 300 ML: 9 INJECTION, SOLUTION INTRAVENOUS at 09:59

## 2019-05-04 RX ADMIN — OXYCODONE HYDROCHLORIDE 10 MG: 5 TABLET ORAL at 19:34

## 2019-05-04 RX ADMIN — OXYCODONE HYDROCHLORIDE 10 MG: 5 TABLET ORAL at 02:41

## 2019-05-04 RX ADMIN — FUROSEMIDE 60 MG: 20 TABLET ORAL at 15:55

## 2019-05-04 RX ADMIN — SODIUM BICARBONATE 650 MG TABLET 650 MG: at 07:51

## 2019-05-04 ASSESSMENT — ACTIVITIES OF DAILY LIVING (ADL)
ADLS_ACUITY_SCORE: 16

## 2019-05-04 ASSESSMENT — MIFFLIN-ST. JEOR: SCORE: 1261.11

## 2019-05-04 NOTE — PROGRESS NOTES
HEMODIALYSIS TREATMENT NOTE    Date: 5/4/2019  Time: 2:13 PM    Data:  Pre Wt:  77.1 kg   Desired Wt: 77.1 kg   Post Wt:    Weight gain:   kg   Weight change:   kg  Ultrafiltration - Post Run Net Total Removed (mL): 0 mL  Ultrafiltration - Post Run Net Total Gain (mL): 0 mL  Vascular Access Status: Yes, secured and visible  Dialyzer Rinse: Streaked  Total Blood Volume Processed: 43.3  Total Dialysis (Treatment) Time:  3 hours    Lab:   No    Interventions:Assessment:  Tx complete. CVC utilized without complication. No fluid obtained this tx. VSS throughout tx. CVC lumens saline locked and clear guard caps applied. Hand off report given to primary nurse.     Plan:    Per nephrology team.

## 2019-05-04 NOTE — PLAN OF CARE
Pt A&O, VSS on RA. Potassium normalized at 4.5 today. Dialysis this am, tolerated well, no fluid off. Pt resting most of the shift. BGs stable. No c/o pain. Atarax given prn for itching. Up SBA. HOWIE stockings on for mild BLE edema. Plan to discharge on HD.

## 2019-05-04 NOTE — PROGRESS NOTES
Nephrology Progress Note  05/04/2019         Assessment & Recommendations:     Kim Anne is a 73 year old female with a PMHx significant for solitary kidney (post donation to her brother 1988), biopsy-proven DM nephropathy (2/2015) , CKD 5 (now ESKD and started on HD this admission) , hypothyroidism , COPD , HTN , admitted from the clinic after a rapid repsonse was called for hyperkalemia (6.6) , Nephrology consulted for management of her ESKD and hyperkalemia     ESKD with new start HD  Creatinine at 4.7 with GFR ~8 with >10g/g of albuminuria, SHe has a 2015 kidney biopsy showing DM nephropathy, and her kidney function has been slowly declining associated with uremic symptoms (fatigue , tremors , poor appetite and asterixis). HD initiated 5/2/19  - Plan for 3rd treatment today, then discharge home to follow with Dr Liz at [facility] on Tuesday  - continue on home diuretics for volume management     Hyperkalemia  - keep on low potassium diet     Mild hypervolemia and hx of HTN  PTA on amlodipine 10 , furosemide 80mg bid (increased this admission from 40mg) , hydralazine 25mg BiD and metoprolol 25 daily  - goal /80s     Anemia of chronic disease  Iron replete 34% Isat, ferritin 286 , Iron 48 on 5/1/19,   - EPO dosed at 2000U     MBD  , Hypocalcemia and phos wnl, Vit D 15 (on 2000U daily)  - Hectorol dosed at 1mcg with iHD, she ran on K3 Ca3 bath     DM 2   On Insulin    Recommendations were communicated to primary team     Seen and discussed with Dr. Mehran Jackson MD   [phone]    Interval History :   Nursing and provider notes from last 24 hours reviewed.    Kim Anne was seen and examined on HD today. She has no overnight issues, and denies any complaints. SHe has no chest pain or shortness of breath. She has no LE swelling. She has been eating and drinking well.     Review of Systems:   I reviewed the following systems:  GI: + appetite. - nausea or vomiting or  "diarrhea.   Neuro:  mild confusion  Constitutional:  - fever or chills  CV: - dyspnea or edema.  - chest pain.    Physical Exam:   I/O last 3 completed shifts:  In: 720 [P.O.:720]  Out: 500 [Urine:200; Other:300]   /67   Pulse 72   Temp 97.9  F (36.6  C) (Oral)   Resp 18   Ht 1.626 m (5' 4\")   Wt 77.1 kg (170 lb)   SpO2 97%   BMI 29.18 kg/m       GENERAL APPEARANCE: NAD  EYES:  - scleral icterus, pupils equal  HENT: mouth without ulcers or lesions  PULM: lungs clear to auscultation bilaterally, equal air movement, no clubbing  CV: regular rhythm, normal rate, no rub     -JVD -     -edema trace   GI: soft, nontender,  Non distended, bowel sounds are +  INTEGUMENT: no cyanosis, - rash  NEURO:  mild asterixis   Access RIJ tunneled placed 5/2/19    Labs:   All labs reviewed by me  Electrolytes/Renal -   Recent Labs   Lab Test 05/03/19  0625 05/02/19  1405 05/02/19  0645 05/02/19  0411  05/01/19  1940  05/01/19  1320  11/16/18  0947 08/03/18  0859  09/11/17  0928     --  145* 144  --   --    < > 143   < > 142 144   < > 145*   POTASSIUM 4.5 5.8* 5.9* 6.0*   < > 6.2*   < > 6.6*   < > 5.5* 4.9   < > 4.6   CHLORIDE 111*  --  123* 119*  --   --   --  119*   < > 116* 116*   < > 116*   CO2 20  --  14* 21  --   --   --  19*   < > 17* 19*   < > 20   BUN 33*  --  43* 45*  --   --   --  44*   < > 42* 37*   < > 26   CR 3.91*  --  4.77* 4.74*  --   --   --  4.80*   < > 5.43* 3.88*   < > 2.76*   *  --  87 40*  --   --    < > 82   < > 85 94   < > 100*   FRANCES 6.7*  --  7.0* 7.0*  --   --   --  7.2*   < > 7.1* 7.7*   < > 8.1*   MAG  --   --  2.0  --   --  1.5*  --   --   --   --   --   --  1.7   PHOS  --   --   --   --   --   --   --  3.1  --  5.2* 4.7*   < > 4.1    < > = values in this interval not displayed.     CBC -   Recent Labs   Lab Test 05/03/19  0625 05/01/19  1753 05/01/19  1320 04/03/19  1202   WBC 5.6  --  7.0 7.4   HGB 8.8* 10.5* 10.0* 11.2*     --  216 182     LFTs -   Recent Labs   Lab Test " 05/01/19  1940 05/01/19  1320 11/16/18  0947  10/25/17  0639  09/07/17  1135   ALKPHOS 77  --   --   --  164*  --  158*   BILITOTAL 0.1*  --   --   --  0.2  --  0.2   ALT 17  --   --   --  16  --  18   AST 21  --   --   --  13  --  19   PROTTOTAL 4.5*  --   --   --  5.8*  --  5.7*   ALBUMIN 1.2* 1.6* 1.5*   < > 1.9*   < > 1.9*    < > = values in this interval not displayed.     Iron Panel -   Recent Labs   Lab Test 05/01/19  1320 02/16/18  0945 09/11/17  0928   IRON 48 46 73   IRONSAT 34 28 43   * 134 127     Imaging:  All imaging studies reviewed by me.     Current Medications:    sodium chloride 0.9%  250 mL Intravenous Once in dialysis     sodium chloride 0.9%  300 mL Hemodialysis Machine Once     atorvastatin  40 mg Oral Daily     fluticasone-vilanterol  1 puff Inhalation Daily     furosemide  60 mg Oral Daily     gelatin absorbable  1 each Topical During Hemodialysis (from stock)     levothyroxine  200 mcg Oral QAM AC     - MEDICATION INSTRUCTIONS -   Does not apply Once     senna-docusate  1 tablet Oral BID    Or     senna-docusate  2 tablet Oral BID     sodium bicarbonate  650 mg Oral BID     sodium chloride (PF)  9 mL Intracatheter During Hemodialysis (from stock)     sodium chloride (PF)  9 mL Intracatheter During Hemodialysis (from stock)     umeclidinium  1 puff Inhalation Daily     cholecalciferol  2,000 Units Oral Daily       Dain Jackson MD

## 2019-05-04 NOTE — PLAN OF CARE
PT 5A: Defer - Following chart review and further discussion with OT, pt with no acute IP PT needs. Pt mobilizing at SBA with FWW (FWW is baseline) and will be safe to return to home setting when medically appropriate. OT to address fatigue management techniques. Will complete orders. Thank you for your referral.

## 2019-05-04 NOTE — PROGRESS NOTES
[facility], [place]    Hospitalist Progress Note    Date of Service (when I saw the patient): 05/04/2019    Assessment & Plan     Kim Anne is a 73 year old female admitted on 5/1/2019. She has PMH of DMII, ESRD, HLD, COPD, Hypothyroidism, Tobacco abuse who presents to the ED from Nephrology clinic for evaluation of Hyperkalemia.      # Hyperkalemia from ESRD, Resolved, now that she is on dialysis    -initially got dextrose, insulin, bicarb, keyaxalate  -now better after HD  -renal diet    # ESRD, CKD V: 2/2 biopsy proven diabetic nephropathy. Pt w/ solitary kidney s/p donation to brother in 1998.  Cr 4.80, BUN 44. BL Cr appears 3.0-4.0 over past 12 months. was hypervolemic on exam. No O2 use.    -started on dialysis inpatient after tunnel line place.   -not on diabetic medications and HgA1c Was 5.5 IN Jan.     #. Bone health: PTH high, ionized ca borderline low 4.2, Phosphorus 3.1.   -low Vit D level  -start oral supplements  -getting hectoral with dialysis    # Hypervolemic on exam, some intermittent cough  -CXR showed mild BL pleural effusion     # DMII: A1c 5.2 (1/2019). Diet controlled.  BG stable on admission.    - BG c are well controlled.     # Hypoalbuminemia: albumin 1.6 on admission.   - from nephrotic range protein uria  - support nutrtion     # Anemia of chronic renal disease: Hgb 10.0, MCV 94. Ferritin is 286, Saturation index 34  -getting Epo with HD  -good iron stores     # Hypocalcemia: Ca 7.2, ICal 4.2.  - Received 2g calcium gluconate in ED  - started on Vit D and getting hectoral with HD     # HTN: Was high prior to HD but now low  - Off norvasc, Hydralazine and metoprolo. Incrase lasix to 60 mg BID. Monitor for how she does in between dialysis.      # COPD: No Home O2 use.Stable.   - Continue Ct PTA Spiriva, dulera, albuterol.    # Hypothyroidism: PTA synthroid.  - Continue Synthroid     #Tobacco Abuse: Current 5-6 cigarette/day smoker  - Recommend  cessation  - Nicotine gum per patient request       DVT Prophylaxis: Pneumatic Compression Devices, waiting for tunnel line  Code Status: Full Code     Disposition Plan     Expected discharge tomorrow after dialsysi, recommended to prior living arrangement once HD plan per       Gerri Zapata MD    Interval History     She is doing better. No weight changes.     -Data reviewed today: I reviewed all new labs and imaging results over the last 24 hours. I personally reviewed no images or EKG's today.    Physical Exam   Temp: 98.1  F (36.7  C) Temp src: Oral BP: 137/54 Pulse: 57 Heart Rate: 61 Resp: 20 SpO2: 97 % O2 Device: None (Room air)    Vitals:    05/02/19 0540 05/02/19 1030 05/04/19 0719   Weight: 76.2 kg (168 lb) 76.3 kg (168 lb 4.8 oz) 77.1 kg (170 lb)     Vital Signs with Ranges  Temp:  [97.7  F (36.5  C)-99.1  F (37.3  C)] 98.1  F (36.7  C)  Pulse:  [50-72] 57  Heart Rate:  [50-71] 61  Resp:  [14-20] 20  BP: ()/(42-67) 137/54  Cuff Mean (mmHg):  [63] 63  SpO2:  [94 %-99 %] 97 %  I/O last 3 completed shifts:  In: 720 [P.O.:720]  Out: 500 [Urine:200; Other:300]    Constitutional:Awake, alert, cooperative, no apparent distress  Respiratory: Clear to auscultation bilaterally, no crackles or wheezing  Cardiovascular: Regular rate and rhythm, normal S1 and S2, and no murmur noted  GI: Normal bowel sounds, soft, non-distended, non-tender  Skin/Integumen:  Edema in legs and hand and back noted      Medications       atorvastatin  40 mg Oral Daily     fluticasone-vilanterol  1 puff Inhalation Daily     furosemide  60 mg Oral BID     levothyroxine  200 mcg Oral QAM AC     senna-docusate  1 tablet Oral BID    Or     senna-docusate  2 tablet Oral BID     sodium bicarbonate  650 mg Oral BID     umeclidinium  1 puff Inhalation Daily     cholecalciferol  2,000 Units Oral Daily       Data   Recent Labs   Lab 05/03/19  0625 05/02/19  1405 05/02/19  0645 05/02/19  0411  05/01/19  1940 05/01/19  1753 05/01/19  1745  05/01/19  1320   WBC 5.6  --   --   --   --   --   --   --  7.0   HGB 8.8*  --   --   --   --   --  10.5*  --  10.0*   MCV 93  --   --   --   --   --   --   --  94     --   --   --   --   --   --   --  216   INR  --   --   --   --   --   --   --  0.95  --      --  145* 144  --   --  141  --  143   POTASSIUM 4.5 5.8* 5.9* 6.0*   < > 6.2* 6.5* 6.4* 6.6*   CHLORIDE 111*  --  123* 119*  --   --   --   --  119*   CO2 20  --  14* 21  --   --   --   --  19*   BUN 33*  --  43* 45*  --   --   --   --  44*   CR 3.91*  --  4.77* 4.74*  --   --   --   --  4.80*   ANIONGAP 7  --  8 5  --   --   --   --  6   FRANCES 6.7*  --  7.0* 7.0*  --   --   --   --  7.2*   *  --  87 40*  --   --  73  --  82   ALBUMIN  --   --   --   --   --  1.2*  --   --  1.6*   PROTTOTAL  --   --   --   --   --  4.5*  --   --   --    BILITOTAL  --   --   --   --   --  0.1*  --   --   --    ALKPHOS  --   --   --   --   --  77  --   --   --    ALT  --   --   --   --   --  17  --   --   --    AST  --   --   --   --   --  21  --   --   --     < > = values in this interval not displayed.       No results found for this or any previous visit (from the past 24 hour(s)).

## 2019-05-05 ENCOUNTER — TELEPHONE (OUTPATIENT)
Dept: FAMILY MEDICINE | Facility: CLINIC | Age: 74
End: 2019-05-05

## 2019-05-05 VITALS
TEMPERATURE: 98.2 F | HEIGHT: 64 IN | SYSTOLIC BLOOD PRESSURE: 145 MMHG | RESPIRATION RATE: 18 BRPM | DIASTOLIC BLOOD PRESSURE: 64 MMHG | WEIGHT: 170 LBS | BODY MASS INDEX: 29.02 KG/M2 | OXYGEN SATURATION: 97 % | HEART RATE: 68 BPM

## 2019-05-05 DIAGNOSIS — G89.29 CHRONIC LOW BACK PAIN WITHOUT SCIATICA, UNSPECIFIED BACK PAIN LATERALITY: ICD-10-CM

## 2019-05-05 DIAGNOSIS — M54.50 CHRONIC LOW BACK PAIN WITHOUT SCIATICA, UNSPECIFIED BACK PAIN LATERALITY: ICD-10-CM

## 2019-05-05 LAB
GLUCOSE BLDC GLUCOMTR-MCNC: 114 MG/DL (ref 70–99)
GLUCOSE BLDC GLUCOMTR-MCNC: 81 MG/DL (ref 70–99)
GLUCOSE BLDC GLUCOMTR-MCNC: 89 MG/DL (ref 70–99)

## 2019-05-05 PROCEDURE — 25000132 ZZH RX MED GY IP 250 OP 250 PS 637: Performed by: INTERNAL MEDICINE

## 2019-05-05 PROCEDURE — 00000146 ZZHCL STATISTIC GLUCOSE BY METER IP

## 2019-05-05 PROCEDURE — 99239 HOSP IP/OBS DSCHRG MGMT >30: CPT | Performed by: HOSPITALIST

## 2019-05-05 PROCEDURE — 25000132 ZZH RX MED GY IP 250 OP 250 PS 637: Performed by: PHYSICIAN ASSISTANT

## 2019-05-05 PROCEDURE — 86481 TB AG RESPONSE T-CELL SUSP: CPT | Performed by: HOSPITALIST

## 2019-05-05 PROCEDURE — 36415 COLL VENOUS BLD VENIPUNCTURE: CPT | Performed by: HOSPITALIST

## 2019-05-05 PROCEDURE — 25000132 ZZH RX MED GY IP 250 OP 250 PS 637: Performed by: HOSPITALIST

## 2019-05-05 RX ORDER — HYDROXYZINE HYDROCHLORIDE 10 MG/1
10 TABLET, FILM COATED ORAL 3 TIMES DAILY PRN
Qty: 30 TABLET | Refills: 0 | Status: SHIPPED | OUTPATIENT
Start: 2019-05-05 | End: 2019-06-26

## 2019-05-05 RX ORDER — ACETAMINOPHEN 325 MG/1
650 TABLET ORAL EVERY 4 HOURS PRN
Start: 2019-05-05 | End: 2019-11-20

## 2019-05-05 RX ORDER — AMLODIPINE BESYLATE 10 MG/1
10 TABLET ORAL DAILY
Status: DISCONTINUED | OUTPATIENT
Start: 2019-05-05 | End: 2019-05-05 | Stop reason: HOSPADM

## 2019-05-05 RX ORDER — FUROSEMIDE 20 MG
60 TABLET ORAL
Qty: 180 TABLET | Refills: 0 | Status: ON HOLD | OUTPATIENT
Start: 2019-05-05 | End: 2019-06-21

## 2019-05-05 RX ADMIN — LEVOTHYROXINE SODIUM 200 MCG: 100 TABLET ORAL at 07:57

## 2019-05-05 RX ADMIN — VITAMIN D, TAB 1000IU (100/BT) 2000 UNITS: 25 TAB at 07:57

## 2019-05-05 RX ADMIN — ATORVASTATIN CALCIUM 40 MG: 40 TABLET, FILM COATED ORAL at 07:57

## 2019-05-05 RX ADMIN — HYDROXYZINE HYDROCHLORIDE 10 MG: 10 TABLET ORAL at 07:57

## 2019-05-05 RX ADMIN — SENNOSIDES AND DOCUSATE SODIUM 2 TABLET: 8.6; 5 TABLET ORAL at 10:28

## 2019-05-05 RX ADMIN — OXYCODONE HYDROCHLORIDE 10 MG: 5 TABLET ORAL at 07:58

## 2019-05-05 RX ADMIN — SODIUM BICARBONATE 650 MG TABLET 650 MG: at 07:57

## 2019-05-05 RX ADMIN — AMLODIPINE BESYLATE 10 MG: 10 TABLET ORAL at 10:24

## 2019-05-05 RX ADMIN — FUROSEMIDE 60 MG: 20 TABLET ORAL at 07:56

## 2019-05-05 ASSESSMENT — ACTIVITIES OF DAILY LIVING (ADL)
ADLS_ACUITY_SCORE: 16

## 2019-05-05 NOTE — PLAN OF CARE
"BP 97/68   Pulse 57   Temp 98.1  F (36.7  C) (Oral)   Resp 20   Ht 1.626 m (5' 4\")   Wt 77.1 kg (170 lb)   SpO2 98%   BMI 29.18 kg/m      Shift: 1700-2019   Reason for Admission: Hyperkalemia (resolved)  VS: VSS on RA  Neuros: A/Ox4, able to make needs known.   GI/: No BMs this shift. Oliguric d/t dialysis   Diet: Dialysis diet  Drains/Lines: PIV SL, CVC 2L   Activity: SBA  Pain/Nausea: Denies nausea. C/o of back and leg pain- PRN Oxycodone given?   Skin: Denied full skin assessment  Social: Family visited briefly ?   Plan of care: Possible discharge tomorrow after dialysis. Will continue to monitor and follow POC.   "

## 2019-05-05 NOTE — PROGRESS NOTES
[id number] student note     Reason for admission: hyperkalemia  Pain: denies  Neuro: A&Ox4  Cardiac: temp 98.2 resp 18, pulse 68, /64  GI/: had BM in morning. CVC double limen (right)  Diet: Dialysis Diet  Lines: peripheral IV left arm  Skin: blanchable redness   Labs: potassium: 5.8 as of 5/5/19  New this shift: Pt discharged in afternoon. Pt educated in dialysis diet, low potassium diet and in moving forward with dialysis.
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
## 2019-05-05 NOTE — PLAN OF CARE
Alert and oriented x 4. Up with standby assist to the commode. Denies pain overnight. BG monitored overnight. For dialysis today. Possible discharge today. Vital signs stable. Will continue to monitor and follow plan of care

## 2019-05-05 NOTE — PROGRESS NOTES
Nephrology Progress Note  05/05/2019         Assessment & Recommendations:     Kim Anne is a 73 year old female with a PMHx significant for solitary kidney (post donation to her brother 1988), biopsy-proven DM nephropathy (2/2015) , CKD 5 (now ESKD and started on HD this admission) , hypothyroidism , COPD , HTN , admitted from the clinic after a rapid repsonse was called for hyperkalemia (6.6) , Nephrology consulted for management of her ESKD and hyperkalemia     ESKD with new start HD  Creatinine at 4.7 with GFR ~8 with >10g/g of albuminuria, SHe has a 2015 kidney biopsy showing DM nephropathy, and her kidney function has been slowly declining associated with uremic symptoms (fatigue , tremors , poor appetite and asterixis). HD initiated 5/2/19  - Plan for discharge home today to follow with Dr Liz at [facility] on Tuesday     Hyperkalemia  - keep on low potassium diet     Mild hypervolemia and hx of HTN  PTA on amlodipine 10 , furosemide 80mg bid (increased this admission from 40mg) , hydralazine 25mg BiD and metoprolol 25 daily  - goal /80s  - restart amlodipine 10mg daily     Anemia of chronic disease  Iron replete 34% Isat, ferritin 286 , Iron 48 on 5/1/19,   - EPO dosed at 2000U     MBD  , Hypocalcemia and phos wnl, Vit D 15 (on 2000U daily)  - Hectorol dosed at 1mcg with iHD, she ran on K3 Ca3 bath     DM 2   On Insulin    Recommendations were communicated to primary team     Seen and discussed with Dr. Mehran Jackson MD   [phone]    Interval History :   Nursing and provider notes from last 24 hours reviewed.    Kim Anne was seen and examined this morning. She had no overnight issues. 's, antihypertensives held yesterday. She had no chest pains and no shortness of breath.     Review of Systems:   I reviewed the following systems:  GI: + appetite. - nausea or vomiting or diarrhea.   Neuro:  mild confusion  Constitutional:  - fever or chills  CV: -  "dyspnea or edema.  - chest pain.    Physical Exam:   I/O last 3 completed shifts:  In: 0   Out: 200 [Urine:200]   /64 (BP Location: Right arm)   Pulse 68   Temp 98.2  F (36.8  C) (Oral)   Resp 18   Ht 1.626 m (5' 4\")   Wt 77.1 kg (170 lb)   SpO2 97%   BMI 29.18 kg/m       GENERAL APPEARANCE: NAD  EYES:  - scleral icterus, pupils equal  HENT: mouth without ulcers or lesions  PULM: lungs clear to auscultation bilaterally, equal air movement, no clubbing  CV: regular rhythm, normal rate, no rub     -JVD -     -edema trace   GI: soft, nontender,  Non distended, bowel sounds are +  INTEGUMENT: no cyanosis, - rash  NEURO:  mild asterixis   Access RIJ tunneled placed 5/2/19    Labs:   All labs reviewed by me  Electrolytes/Renal -   Recent Labs   Lab Test 05/03/19  0625 05/02/19  1405 05/02/19  0645 05/02/19  0411  05/01/19  1940  05/01/19  1320  11/16/18  0947 08/03/18  0859  09/11/17  0928     --  145* 144  --   --    < > 143   < > 142 144   < > 145*   POTASSIUM 4.5 5.8* 5.9* 6.0*   < > 6.2*   < > 6.6*   < > 5.5* 4.9   < > 4.6   CHLORIDE 111*  --  123* 119*  --   --   --  119*   < > 116* 116*   < > 116*   CO2 20  --  14* 21  --   --   --  19*   < > 17* 19*   < > 20   BUN 33*  --  43* 45*  --   --   --  44*   < > 42* 37*   < > 26   CR 3.91*  --  4.77* 4.74*  --   --   --  4.80*   < > 5.43* 3.88*   < > 2.76*   *  --  87 40*  --   --    < > 82   < > 85 94   < > 100*   FRANCES 6.7*  --  7.0* 7.0*  --   --   --  7.2*   < > 7.1* 7.7*   < > 8.1*   MAG  --   --  2.0  --   --  1.5*  --   --   --   --   --   --  1.7   PHOS  --   --   --   --   --   --   --  3.1  --  5.2* 4.7*   < > 4.1    < > = values in this interval not displayed.     CBC -   Recent Labs   Lab Test 05/03/19  0625 05/01/19  1753 05/01/19  1320 04/03/19  1202   WBC 5.6  --  7.0 7.4   HGB 8.8* 10.5* 10.0* 11.2*     --  216 182     LFTs -   Recent Labs   Lab Test 05/01/19  1940 05/01/19  1320 11/16/18  0947  10/25/17  0639  09/07/17  1135 "   ALKPHOS 77  --   --   --  164*  --  158*   BILITOTAL 0.1*  --   --   --  0.2  --  0.2   ALT 17  --   --   --  16  --  18   AST 21  --   --   --  13  --  19   PROTTOTAL 4.5*  --   --   --  5.8*  --  5.7*   ALBUMIN 1.2* 1.6* 1.5*   < > 1.9*   < > 1.9*    < > = values in this interval not displayed.     Iron Panel -   Recent Labs   Lab Test 05/01/19  1320 02/16/18  0945 09/11/17  0928   IRON 48 46 73   IRONSAT 34 28 43   * 134 127     Imaging:  All imaging studies reviewed by me.     Current Medications:    amLODIPine  10 mg Oral Daily     atorvastatin  40 mg Oral Daily     fluticasone-vilanterol  1 puff Inhalation Daily     furosemide  60 mg Oral BID     levothyroxine  200 mcg Oral QAM AC     senna-docusate  1 tablet Oral BID    Or     senna-docusate  2 tablet Oral BID     sodium bicarbonate  650 mg Oral BID     umeclidinium  1 puff Inhalation Daily     cholecalciferol  2,000 Units Oral Daily       Dain Jackson MD

## 2019-05-05 NOTE — PLAN OF CARE
PT discharging home with plan in place for outpatient dialysis. PIV removed. Belongings gathered per family. AVS reviewed and signed at bedside. Education on dialysis diet given. Family providing transport home. Adequate for discharge.

## 2019-05-06 ENCOUNTER — PATIENT OUTREACH (OUTPATIENT)
Dept: CARE COORDINATION | Facility: CLINIC | Age: 74
End: 2019-05-06

## 2019-05-06 LAB
GAMMA INTERFERON BACKGROUND BLD IA-ACNC: 0.06 IU/ML
M TB IFN-G BLD-IMP: POSITIVE
M TB IFN-G CD4+ BCKGRND COR BLD-ACNC: 9.7 IU/ML
MITOGEN IGNF BCKGRD COR BLD-ACNC: 0.73 IU/ML
MITOGEN IGNF BCKGRD COR BLD-ACNC: 0.88 IU/ML

## 2019-05-06 RX ORDER — OXYCODONE HYDROCHLORIDE 10 MG/1
10 TABLET ORAL EVERY 8 HOURS PRN
Qty: 90 TABLET | Refills: 0 | Status: ON HOLD | OUTPATIENT
Start: 2019-05-06 | End: 2019-05-30

## 2019-05-06 NOTE — TELEPHONE ENCOUNTER
Pt would like her Oxycodone refill today as she's completely out.     Pt tel: [phone]      Bucky Weller

## 2019-05-06 NOTE — TELEPHONE ENCOUNTER
Controlled Substance Refill Request for oxyCODONE IR (ROXICODONE) 10 MG tablet   Last refill: 4/3/19-- #90 for 30 day supply    Last clinic visit: 4/3/19   Next appt: none scheduled at this time with PCP      Controlled substance agreement on file: Yes:  Date 4/18/18.  Documentation in problem list reviewed:  Yes  Processing:  Patient will  in clinic    [place] reviewed:  reviewed- no concerns      Kallie Pritchard RN  05/06/19  11:13 AM

## 2019-05-06 NOTE — PLAN OF CARE
Occupational Therapy Discharge Summary    Reason for therapy discharge:    Discharged to home.    Progress towards therapy goal(s). See goals on Care Plan in [facility] electronic health record for goal details.  Goals partially met.  Barriers to achieving goals:   discharge from facility.    Therapy recommendation(s):    Family to assist with ADLs/IADLs PRN

## 2019-05-06 NOTE — PROGRESS NOTES
[facility]: Post-Discharge Note  SITUATION                                                      Admission:    Admission Date: 05/01/19   Reason for Admission: Hyperkalemia from ESRD, Resolved. Sp Tunnel line placement and Initiation of dialysis  Discharge:   Discharge Date: 05/05/19  Discharge Diagnosis: Hyperkalemia from ESRD, Resolved. Sp Tunnel line placement and Initiation of dialysis  Discharge Service: Hospitalist     BACKGROUND                                                         Kim Anne is a 73 year old female who presents in transfer from Nephrology clinic for evaluation and treatment of Hyperkalemia.   Patient reports that she makes small amounts of urine and follows a low K diet, though had routine labs drawn at Nephrology clinic today and found to have elevated potassium, prompting ED evaluation. Patient denies CP, sob, palpitations abdominal pain, cough, fever, chills, dysuria.      Currently, patient with complaints of fatigue from being in clinic and ED. We discussed plan as outline above and patient agreeable.      Patient does not endorse: headaches, changes in vision, chest pain, palpitations, upper respiratory symptoms of rhinorrhea or congestion, shortness of breath, cough, sputum production, wheezing, abdominal pain, nausea, emesis, constipation, diarrhea, dysuria, edema, rashes, weakness, focal neurologic deficits, recent travel, illness, fever, chills.    ASSESSMENT      Discharge Assessment  Patient reports symptoms are: Unchanged  Does the patient have all of their medications?: Yes  Does patient know what their new medications are for?: Yes  Does patient have a follow-up appointment scheduled?: No  Does patient have any other questions or concerns?: No    Post-op  Did the patient have surgery or a procedure: No  Fever: No  Chills: Yes  Eating & Drinking: (Patient reports she doesn't have much of an appetite and hasn't eaten much since going home. Patient  states she needs a prescription for Ensure. )  Bowel Function: normal  Urinary Status: voiding without complaint/concerns    PLAN                                                      Outpatient Plan:      Patient needs follow up with PCP (she seemed confused when informed about this and may need to be called to help set this appointment up.    Follow up with primary care provider, Ailyn Nieves, within 7 days for hospital follow- up.  No follow up labs or test are needed.      Future Appointments   Date Time Provider Department Center   12/5/2090  7:30 AM [facility] Own[facility]           Althea Edmondson, CMA

## 2019-05-08 ENCOUNTER — OFFICE VISIT (OUTPATIENT)
Dept: FAMILY MEDICINE | Facility: CLINIC | Age: 74
End: 2019-05-08
Payer: COMMERCIAL

## 2019-05-08 ENCOUNTER — OFFICE VISIT (OUTPATIENT)
Dept: BEHAVIORAL HEALTH | Facility: CLINIC | Age: 74
End: 2019-05-08
Payer: COMMERCIAL

## 2019-05-08 VITALS
HEART RATE: 74 BPM | BODY MASS INDEX: 29.01 KG/M2 | SYSTOLIC BLOOD PRESSURE: 144 MMHG | TEMPERATURE: 99.2 F | WEIGHT: 169 LBS | RESPIRATION RATE: 16 BRPM | OXYGEN SATURATION: 97 % | DIASTOLIC BLOOD PRESSURE: 72 MMHG

## 2019-05-08 DIAGNOSIS — I25.2 HISTORY OF MI (MYOCARDIAL INFARCTION): ICD-10-CM

## 2019-05-08 DIAGNOSIS — L85.3 DRY SKIN: ICD-10-CM

## 2019-05-08 DIAGNOSIS — J42 CHRONIC BRONCHITIS, UNSPECIFIED CHRONIC BRONCHITIS TYPE (H): ICD-10-CM

## 2019-05-08 DIAGNOSIS — Z09 HOSPITAL DISCHARGE FOLLOW-UP: Primary | ICD-10-CM

## 2019-05-08 DIAGNOSIS — H91.90 HEARING LOSS, UNSPECIFIED HEARING LOSS TYPE, UNSPECIFIED LATERALITY: ICD-10-CM

## 2019-05-08 DIAGNOSIS — E78.5 HYPERLIPIDEMIA LDL GOAL <100: ICD-10-CM

## 2019-05-08 DIAGNOSIS — F17.200 TOBACCO DEPENDENCE SYNDROME: ICD-10-CM

## 2019-05-08 DIAGNOSIS — R80.9 TYPE 2 DIABETES MELLITUS WITH MICROALBUMINURIA, WITH LONG-TERM CURRENT USE OF INSULIN (H): ICD-10-CM

## 2019-05-08 DIAGNOSIS — E11.29 TYPE 2 DIABETES MELLITUS WITH MICROALBUMINURIA, WITH LONG-TERM CURRENT USE OF INSULIN (H): ICD-10-CM

## 2019-05-08 DIAGNOSIS — K02.9 TOOTH DECAY: ICD-10-CM

## 2019-05-08 DIAGNOSIS — Z79.4 TYPE 2 DIABETES MELLITUS WITH MICROALBUMINURIA, WITH LONG-TERM CURRENT USE OF INSULIN (H): ICD-10-CM

## 2019-05-08 DIAGNOSIS — N18.5 CKD (CHRONIC KIDNEY DISEASE) STAGE 5, GFR LESS THAN 15 ML/MIN (H): ICD-10-CM

## 2019-05-08 DIAGNOSIS — Z91.199 NON-COMPLIANT PATIENT: ICD-10-CM

## 2019-05-08 DIAGNOSIS — I10 HYPERTENSION GOAL BP (BLOOD PRESSURE) < 130/80: ICD-10-CM

## 2019-05-08 DIAGNOSIS — F43.21 ADJUSTMENT DISORDER WITH DEPRESSED MOOD: Primary | ICD-10-CM

## 2019-05-08 PROCEDURE — 99215 OFFICE O/P EST HI 40 MIN: CPT | Performed by: NURSE PRACTITIONER

## 2019-05-08 PROCEDURE — 90832 PSYTX W PT 30 MINUTES: CPT | Performed by: SOCIAL WORKER

## 2019-05-08 RX ORDER — AMMONIUM LACTATE 12 G/100G
LOTION TOPICAL 2 TIMES DAILY
Qty: 225 G | Refills: 0 | Status: ON HOLD | OUTPATIENT
Start: 2019-05-08 | End: 2020-02-28

## 2019-05-08 RX ORDER — METOPROLOL TARTRATE 25 MG/1
25 TABLET, FILM COATED ORAL DAILY
Qty: 30 TABLET | Refills: 0 | Status: ON HOLD | COMMUNITY
Start: 2019-05-08 | End: 2019-05-22

## 2019-05-08 RX ORDER — HYDRALAZINE HYDROCHLORIDE 25 MG/1
25 TABLET, FILM COATED ORAL 2 TIMES DAILY
Qty: 30 TABLET | Refills: 3 | Status: ON HOLD | COMMUNITY
Start: 2019-05-08 | End: 2019-06-21

## 2019-05-08 NOTE — PROGRESS NOTES
[facility]   May 8, 2019      Behavioral Health Clinician Progress Note    Patient Name: Kim Anne           Service Type: Individual      Service Location:   Face to Face in Clinic     Session Start Time: 2:40 pm  Session End Time: 3:00 pm      Session Length: 16 - 37      Attendees: Patient and PCP    Visit Activities (Refresh list every visit): [facility] [employer]    Diagnostic Assessment Date: TBD  Treatment Plan Review Date: TBD  See Flowsheets for today's PHQ-9 and JUSTINE-7 results  Previous PHQ-9:   PHQ-9 SCORE 2/12/2018 3/12/2018 6/25/2018   PHQ-9 Total Score - - -   PHQ-9 Total Score - - -   PHQ-9 Total Score 0 0 0     Previous JUSTINE-7:   JUSTINE-7 SCORE 12/12/2016 3/12/2018 6/25/2018   Total Score - - -   Total Score 0 0 0   Total Score - - -       NAE LEVEL:  NAE Score (Last Two) 2/18/2013 5/23/2014   NAE Raw Score 48 45   Activation Score 80 73.1   NAE Level 4 4       DATA  Extended Session (60+ minutes): No  Interactive Complexity: No  Crisis: No    Treatment Objective(s) Addressed in This Session:  Target Behavior(s): disease management/lifestyle changes manage diabetes better    Grief / Loss: will process grief/loss issues in an adaptive manner  Psychological distress related to Diabetes    Current Stressors / Issues:  [facility] co-visit with patient and PCP in the clinic. Patient reports confusion about how her medications are being set up since returning home from the hospital, as she struggles to see with her glasses. PCP discussed getting her set up for eye and hearing exams, as patient also clearly has difficulty hearing during the visit. She is able to hear and respond appropriately when speaking at a louder volume. Discussed the option of home care or having the Community Paramedic visit patient for weekly medication setup. Patient reports family members who she lives with are supposed to be helping her set up medications, but they also have trouble seeing and reading  "instructions. MTM reviewed medications and set up pill boxes for one week. Patient expressed understanding regarding what she should be taking and how.  Patient reports she is not always eating \"like I should\" because she does not prepare her own meals. Family members prepare most meals and patient does eat with them.   Patient endorsed minimal appetite, and states she often forces herself to eat. She is attending dialysis three day per week and reports feeling somewhat better in regard to having more energy during the day.  Patient appears fairly depressed, though denies low mood or anxiety. Administered a mini mental exam and patient was oriented to person, place, and time.     Progress on Treatment Objective(s) / Homework:  Minimal progress - PREPARATION (Decided to change - considering how); Intervened by negotiating a change plan and determining options / strategies for behavior change, identifying triggers, exploring social supports, and working towards setting a date to begin behavior change    Motivational Interviewing    MI Intervention: Expressed Empathy/Understanding, Supported Autonomy, Collaboration, Evocation, Open-ended questions and Reflections: simple and complex     Change Talk Expressed by the Patient: Committment to change Activation Taking steps    Provider Response to Change Talk: E - Evoked more info from patient about behavior change, A - Affirmed patient's thoughts, decisions, or attempts at behavior change, R - Reflected patient's change talk and S - Summarized patient's change talk statements      Care Plan review completed: No    Medication Review:  No changes to current psychiatric medication(s)    Medication Compliance:  Yes    Changes in Health Issues:   Yes: please see medical note by PCP    Chemical Use Review:   Substance Use: Chemical use reviewed, no active concerns identified      Tobacco Use: No current tobacco use.       Assessment: Current Emotional / Mental Status (status of " significant symptoms):  Risk status (Self / Other harm or suicidal ideation)  Patient denies a history of suicidal ideation, suicide attempts, self-injurious behavior, homicidal ideation, homicidal behavior and and other safety concerns  Patient denies current fears or concerns for personal safety.  Patient denies current or recent suicidal ideation or behaviors.  Patient denies current or recent homicidal ideation or behaviors.  Patient denies current or recent self injurious behavior or ideation.  Patient denies other safety concerns.  A safety and risk management plan has not been developed at this time, however patient was encouraged to call [place] [phone] should there be a change in any of these risk factors.    Appearance:   Appropriate   Eye Contact:   Fair   Psychomotor Behavior: Normal   Attitude:   Cooperative   Orientation:   All  Speech   Rate / Production: Normal    Volume:  Normal   Mood:    Depressed   Affect:    Flat   Thought Content:  Clear   Thought Form:  Logical   Insight:    Fair     Diagnoses:  1. Adjustment disorder with depressed mood        Collateral Reports Completed:  Not Applicable    Plan: (Homework, other):  Patient was given information about behavioral services and was encouraged to schedule a follow up appointment with the clinic [facility] as needed.  She was also given information about mental health symptoms and treatment options .  CD Recommendations: Maintain Sobriety.     LAMINE Bess, [facility]

## 2019-05-08 NOTE — PATIENT INSTRUCTIONS
We will set up your medications today.  We will have the community paramedic come out and help with medication set up and blood pressure.  I am glad you started dialysis.  Please come back in one week and see Joanna.

## 2019-05-08 NOTE — PROGRESS NOTES
SUBJECTIVE:                                                    Kim Anne is a 73 year old female with hx of CKD 5 (hx of solitary kidney/donor and diabetic nephropathy per biopsy), diabetes, HTN, hx of AMI with stent placement, COPD and chronic back pain who presents to clinic today for the following health issues:  [facility]: [facility]    Patient presents for primary care follow up and to follow up from recent hospitalization.    Patient was hospitalized at [place] from 5/1-5/5. She was at her Nephrology appt on 5/1 and noted critically elevated K+ of 6.6. A rapid reponse was called and she was admitted for management. Initially got dextrose/insulin/bicarb/ kayexalate, then tunneled line placed by IR and dialysis initiated.     Patient states she started Dialysis at [facility] this week and will be going Tuesdays, Thursdays and Saturdays. Her line is in her front right chest, no concerns. States she was reluctant to start dialysis for a long time but now is accepting of it.     Patient presents as confused, as she has been at previous visits. Hearing impairment makes it very hard to communicate. She brings her pill boxes which contain multiple errors. States her granddaughter helps her set up her pills. Still lives independently with many family members. She is open to others coming out to her home to help with medication set up and blood pressure check. She brought her wrist BP cuff so we could show her how to use it.           Hospital Follow-up Visit:    Hospital/Nursing Home/IP Rehab Facility: [facility]  Date of Admission: 5/1/19  Date of Discharge: 5/5/19  Reason(s) for Admission: CKD 5, hyperkalemia            Problems taking medications regularly:  None       Medication changes since discharge: increased lasix per Nephrology. Hospital Medicine recommended holding Metoprolol and Hydralazine       Problems adhering to non-medication therapy:  None    Summary of hospitalization:   [facility] discharge summary reviewed  Assessment & Recommendations:      Kim Anne is a 73 year old female with a PMHx significant for solitary kidney (post donation to her brother 1988), biopsy-proven DM nephropathy (2/2015) , CKD 5 (now ESKD and started on HD this admission) , hypothyroidism , COPD , HTN , admitted from the clinic after a rapid repsonse was called for hyperkalemia (6.6) , Nephrology consulted for management of her ESKD and hyperkalemia     ESKD with new start HD  Creatinine at 4.7 with GFR ~8 with >10g/g of albuminuria, SHe has a 2015 kidney biopsy showing DM nephropathy, and her kidney function has been slowly declining associated with uremic symptoms (fatigue , tremors , poor appetite and asterixis). HD initiated 5/2/19  - Plan for discharge home today to follow with Dr Liz at [facility] on Tuesday     Hyperkalemia  - keep on low potassium diet     Mild hypervolemia and hx of HTN  PTA on amlodipine 10 , furosemide 80mg bid (increased this admission from 40mg) , hydralazine 25mg BiD and metoprolol 25 daily  - goal /80s  - restart amlodipine 10mg daily     Anemia of chronic disease  Iron replete 34% Isat, ferritin 286 , Iron 48 on 5/1/19,   - EPO dosed at 2000U     MBD  , Hypocalcemia and phos wnl, Vit D 15 (on 2000U daily)  - Hectorol dosed at 1mcg with iHD, she ran on K3 Ca3 bath     DM 2        Recommendations were communicated to primary team      Seen and discussed with Dr. Mehran Jackson MD   [phone]     Hospital Course:   Kim Anne is a 73 year old female admitted on 5/1/2019. She has PMH of DMII, ESRD, HLD, COPD, Hypothyroidism, Tobacco abuse who presents to the ED from Nephrology clinic for evaluation of Hyperkalemia.      # Hyperkalemia from ESRD, Resolved, now that she is on dialysis     -tunnel line placed by IR and Dialysis initiated. Needs out pt plan for fistula  -initially got dextrose, insulin, bicarb, keyaxalate for  hyperkalemia which did work somewhat.   -now better after HD  -renal diet at discharge     # ESRD, CKD V: 2/2 biopsy proven diabetic nephropathy. Pt w/ solitary kidney s/p donation to brother in 1998.  Cr 4.80, BUN 44. BL Cr appears 3.0-4.0 over past 12 months. was hypervolemic on exam. No O2 use.     -started on dialysis inpatient after tunnel line place.   -not on diabetic medications and HgA1c Was 5.5 IN Jan.   -over last few years, the HbA1c was very good,  But still she got ESRD. Not sure how.      #. Bone health: PTH high, ionized ca borderline low 4.2, Phosphorus 3.1.   -low Vit D level  -start oral supplements  -getting hectoral with dialysis     # Hypervolemic on exam, some intermittent cough  -CXR showed mild BL pleural effusion  -lasix dose increased to 60 mg BiD  -slowly nephrology will start pulling volume.      # DMII: A1c 5.2 (1/2019). Diet controlled.  BG stable on admission.    - BG c are well controlled.      # Hypoalbuminemia: albumin 1.6 on admission.   - from nephrotic range protein uria  - support nutrtion      # Anemia of chronic renal disease: Hgb 10.0, MCV 94. Ferritin is 286, Saturation index 34  -getting Epo with HD  -good iron stores  -After being here in the hospital and dialysis runs, the Hb did dip down to 8.8     # Hypocalcemia: Ca 7.2, ICal 4.2.  - Received 2g calcium gluconate in ED  - started on Vit D and getting hectoral with HD  - PTH is very high, but likely secondary and will likely come down, but needs follow up     # HTN: Was high prior to HD but now low ater initiation of dialysis.   - WE have taken her Off Hydralazine and metoprolo. We will continue norvasc 10 mg po daily, Incrase lasix to 60 mg BID to manage volume. Follow-up BP as out pt with dialysis  and with PCP      # COPD: No Home O2 use.Stable.   - Continue Ct PTA Spiriva, dulera, albuterol.     # Hypothyroidism: PTA synthroid.  - Continue Synthroid     #Tobacco Abuse: Current 5-6 cigarette/day smoker  - Recommend  cessation  - Nicotine gum per patient request                        Diagnostic Tests/Treatments reviewed.  Follow up needed: none  Other Healthcare Providers Involved in Patient s Care:         Care Coordination, Specialist appointment - Nephrology, dialysis and MTM  Update since discharge: stable.     Post Discharge Medication Reconciliation: discharge medications reconciled and changed, per note/orders (see AVS).  Plan of care communicated with patient     Coding guidelines for this visit:  Type of Medical   Decision Making Face-to-Face Visit       within 7 Days of discharge Face-to-Face Visit        within 14 days of discharge   Moderate Complexity [id number]   High Complexity [id number]                    Problems taking medications regularly: yes- poor set-up, confusion    Medication side effects: none    Diet: renal (not following)    Social History     Tobacco Use     Smoking status: Current Every Day Smoker     Packs/day: 0.25     Years: 40.00     Pack years: 10.00     Types: Cigarettes     Last attempt to quit: 10/31/2016     Years since quittin.5     Smokeless tobacco: Never Used   Substance Use Topics     Alcohol use: No     Alcohol/week: 0.0 oz        Problem list and histories reviewed & adjusted, as indicated.  Additional history: as documented    Patient Active Problem List   Diagnosis     Hyperlipidemia LDL goal <100     ARAUZ (dyspnea on exertion)     COPD (chronic obstructive pulmonary disease) (H)     Edema     Fatigue     History of MI (myocardial infarction)     Snores     Knee pain, left     Bunion of great toe of left foot     Dystrophic nail     Arthritis     Peripheral vascular disease (H)     Chronic low back pain     Health Care Home     CKD (chronic kidney disease) stage 4, GFR 15-29 ml/min (H)     Abnormal gait     Advance Care Planning     Vitamin D deficiency     Constipation     Hypertension goal BP (blood pressure) < 130/80     Bradycardia     Callus of foot     Other chronic  pain     Cataracts, bilateral     Hyperopic astigmatism of both eyes     Presbyopia     Asymptomatic bunion, right     Acquired hypothyroidism     Type 2 diabetes mellitus with diabetic chronic kidney disease (H)     Hypertension associated with diabetes (H)     Hyperlipidemia associated with type 2 diabetes mellitus (H)     Obesity (BMI 30-39.9)     History of sick sinus syndrome     Nephrotic syndrome     Pneumonia     Grief     Cigarette nicotine dependence without complication     HCOM with LVOT     Hyperkalemia     Type 2 diabetes, HbA1C goal < 8% (H)     Smoker     Single kidney     Hypothyroid     Hypertension goal BP (blood pressure) < 140/90     Hyperlipidemia     Diabetic nephropathy (H)     Hypoalbuminemia     Skin lesion of hand     ERRONEOUS ENCOUNTER--DISREGARD     JUSTINE (generalized anxiety disorder)     Past Surgical History:   Procedure Laterality Date     APPENDECTOMY       BLEPHAROPLASTY BILATERAL  2013    Procedure: BLEPHAROPLASTY BILATERAL;  BILATERAL UPPER EYELID BLEPHAROPLASTY ;  Surgeon: Olayinka Lyon MD;  Location: [facility]     CATARACT IOL, RT/LT Bilateral      CHOLECYSTECTOMY       COLONOSCOPY  7/15/2011    polyps repeat in 5 years     elected term pregnancy       HYSTEROSCOPIC PLACEMENT CONTRACEPTIVE DEVICE       KIDNEY SURGERY      donated left kideny     OVARY SURGERY      left for cyst benign     subclavian stent  2010    [facility]       Social History     Tobacco Use     Smoking status: Current Every Day Smoker     Packs/day: 0.25     Years: 40.00     Pack years: 10.00     Types: Cigarettes     Last attempt to quit: 10/31/2016     Years since quittin.5     Smokeless tobacco: Never Used   Substance Use Topics     Alcohol use: No     Alcohol/week: 0.0 oz     Family History   Problem Relation Age of Onset     Diabetes Mother         brother, MGM, sister     Kidney Disease Brother         X2 DM two      Alcohol/Drug Child         daughter     Asthma No family  hx of      C.A.D. No family hx of      Hypertension No family hx of      Cerebrovascular Disease No family hx of      Breast Cancer No family hx of      Cancer - colorectal No family hx of      Prostate Cancer No family hx of      Allergies No family hx of      Alzheimer Disease No family hx of      Anesthesia Reaction No family hx of      Arthritis No family hx of      Blood Disease No family hx of      Cancer No family hx of      Cardiovascular No family hx of      Circulatory No family hx of      Congenital Anomalies No family hx of      Connective Tissue Disorder No family hx of      Depression No family hx of      Eye Disorder No family hx of      Genetic Disorder No family hx of      Gastrointestinal Disease No family hx of      Genitourinary Problems No family hx of      Gynecology No family hx of      Heart Disease No family hx of      Lipids No family hx of      Musculoskeletal Disorder No family hx of      Neurologic Disorder No family hx of      Obesity No family hx of      Osteoporosis No family hx of      Psychotic Disorder No family hx of      Respiratory No family hx of      Thyroid Disease No family hx of      Glaucoma No family hx of      Macular Degeneration No family hx of            Current Outpatient Medications   Medication Sig Dispense Refill     acetaminophen (TYLENOL) 325 MG tablet Take 2 tablets (650 mg) by mouth every 4 hours as needed for mild pain       albuterol (PROAIR HFA/PROVENTIL HFA/VENTOLIN HFA) 108 (90 Base) MCG/ACT inhaler Inhale 2 puffs into the lungs every 6 hours as needed for shortness of breath / dyspnea or wheezing 18 g 3     Alcohol Swabs (SM ALCOHOL PREP) 70 % PADS Externally apply 1 pad topically 4 times daily 100 each 11     amLODIPine (NORVASC) 10 MG tablet Take 1 tablet (10 mg) by mouth daily 90 tablet 3     ammonium lactate (LAC-HYDRIN) 12 % external lotion Apply topically 2 times daily 225 g 0     ASPIR-LOW 81 MG EC tablet Take 1 tablet (81 mg) by mouth daily 90  tablet 3     atorvastatin (LIPITOR) 40 MG tablet Take 1 tablet (40 mg) by mouth daily 90 tablet 1     blood glucose monitoring (FREESTYLE LITE) test strip TEST BLOOD SUGAR TWO TIMES A DAY 50 strip 12     blood glucose monitoring (FREESTYLE) lancets Test BS two times daily as directed 100 each 1     Blood Pressure Monitoring (BLOOD PRESSURE CUFF) MISC 1 each 2 times daily 1 each 0     Cholecalciferol (VITAMIN D) 2000 units tablet Take 2,000 Units by mouth daily 90 tablet 3     docusate sodium (COLACE) 100 MG capsule Take 1 capsule (100 mg) by mouth 2 times daily 60 capsule 4     Emollient (EUCERIN CALMING DAILY MOIST) CREA Externally apply 1 dose. topically daily 1 Tube 12     fluticasone (FLONASE) 50 MCG/ACT nasal spray Spray 1 spray into both nostrils daily 9.9 mL 1     furosemide (LASIX) 20 MG tablet Take 3 tablets (60 mg) by mouth 2 times daily 180 tablet 0     hydrALAZINE (APRESOLINE) 25 MG tablet Take 1 tablet (25 mg) by mouth 2 times daily 30 tablet 3     hydrOXYzine (ATARAX) 10 MG tablet Take 1 tablet (10 mg) by mouth 3 times daily as needed for itching 30 tablet 0     levothyroxine (SYNTHROID/LEVOTHROID) 200 MCG tablet Take 1 tablet (200 mcg) by mouth daily 90 tablet 1     metoprolol tartrate (LOPRESSOR) 25 MG tablet Take 1 tablet (25 mg) by mouth daily 30 tablet 0     mometasone-formoterol (DULERA) 100-5 MCG/ACT inhaler Inhale 2 puffs into the lungs 2 times daily 1 Inhaler 1     nitroGLYcerin (NITROSTAT) 0.4 MG sublingual tablet Place 1 tablet (0.4 mg) under the tongue every 5 minutes as needed for chest pain 25 tablet 0     nystatin (MYCOSTATIN) 988296 UNIT/GM POWD Apply 1 g topically 3 times daily as needed 30 g 1     order for DME Equipment being ordered: Ensure 6 Can 3     order for DME Equipment being ordered: cane 1 each 0     order for DME Equipment being ordered: Diabetic Shoes 1 each 0     order for DME Equipment being ordered: two pairs moderate knee high support hose 2 Device 1     order for DME  Equipment being ordered: Compression socks.  Strength:15-20 mmHg 2 each 0     order for DME One wheeled walker with seat and brakes and basket 1 Device 0     polyethylene glycol (MIRALAX) powder Take 17 g (1 capful) by mouth daily as needed for constipation 510 g 2     sodium bicarbonate 325 MG tablet Take 2 tablets (650 mg) by mouth 2 times daily 360 tablet 3     tiotropium (SPIRIVA HANDIHALER) 18 MCG inhaled capsule Inhale contents of one capsule daily. 90 capsule 3     oxyCODONE IR (ROXICODONE) 10 MG tablet Take 1 tablet (10 mg) by mouth every 8 hours as needed for moderate to severe pain 90 tablet 0     Allergies   Allergen Reactions     Contrast Dye Hives and Itching     Clonidine      She had as IP and thinks it made her itchy     Diatrizoate Other (See Comments)     Diltiazem      Severe bradycardia     Iodine-131      Recent Labs   Lab Test 05/03/19  0625 05/02/19  1405 05/02/19  0645  05/01/19  1940  05/01/19  1748  01/04/19  1426  10/19/18  0939  07/26/18  1116  10/25/17  0639 10/20/17  1626  09/07/17  1135   A1C  --   --   --   --   --   --  5.2  --  5.5  --   --   --  5.8*   < > 6.6*  --   --  6.4*   LDL  --   --   --   --   --   --   --   --   --   --  46  --   --   --  104*  --   --  81   HDL  --   --   --   --   --   --   --   --   --   --  77  --   --   --  67  --   --  75   TRIG  --   --   --   --   --   --   --   --   --   --  188*  --   --   --  212*  --   --  283*   ALT  --   --   --   --  17  --   --   --   --   --   --   --   --   --  16  --   --  18   CR 3.91*  --  4.77*   < >  --   --   --    < >  --    < >  --    < >  --    < > 2.77*  --    < > 2.70*   GFRESTIMATED 11*  --  8*   < >  --   --   --    < >  --    < >  --    < >  --    < > 17*  --    < > 17*   GFRESTBLACK 12*  --  10*   < >  --   --   --    < >  --    < >  --    < >  --    < > 20*  --    < > 21*   POTASSIUM 4.5 5.8* 5.9*   < > 6.2*   < > 6.4*   < >  --    < >  --    < >  --    < > 4.3  --    < > 6.2*   TSH  --   --   --   --    --   --   --   --   --   --   --   --  1.12  --   --  1.04  --  71.77*    < > = values in this interval not displayed.      BP Readings from Last 3 Encounters:   05/08/19 144/72   05/05/19 145/64   05/01/19 (!) 167/97    Wt Readings from Last 3 Encounters:   05/08/19 76.7 kg (169 lb)   05/04/19 77.1 kg (170 lb)   05/01/19 76.3 kg (168 lb 4.8 oz)        Labs reviewed in EPIC  Problem list, Medication list, Allergies, and Medical/Social/Surgical histories reviewed in [facility] and updated as appropriate.     ROS: Constitutional, neuro, ENT, endocrine, pulmonary, cardiac, gastrointestinal, genitourinary, musculoskeletal, integument and psychiatric systems are negative, except as otherwise noted above in the HPI.       OBJECTIVE:                                                    /72   Pulse 74   Temp 99.2  F (37.3  C) (Temporal)   Resp 16   Wt 76.7 kg (169 lb)   SpO2 97%   BMI 29.01 kg/m    Body mass index is 29.01 kg/m .  GENERAL: healthy, alert, well nourished, well hydrated, no distress  EYES: Eyes grossly normal to inspection, extraocular movements - intact, and PERRL    RESP: + scattered rhonchi throughout  CV: regular rates and rhythm, 2/5 mid systolic murmur noted    MS: extremities- no gross deformities noted, 1+ pitting edema BLE  SKIN: no suspicious lesions, no rashes  NEURO: strength and tone- normal, sensory exam- grossly normal, mentation- intact, speech- normal, reflexes- symmetric  Non focal no aphasia.     Mental Status Assessment:  Appearance:   Appropriate   Eye Contact:   Fair   Psychomotor Behavior: Normal   Attitude:   Cooperative   Orientation:   All  Speech   Rate / Production: Normal    Volume:  Soft   Mood:    Depressed   Affect:    Flat  Subdued   Thought Content:  Clear   Thought Form:  Goal Directed   Insight:    Poor   Attention Span and Concentration:  limited  Recent and Remote Memory:  intact  Fund of Knowledge: appropriate  Muscle Strength and Tone: normal   Suicidal Ideation:  reports no thoughts, no intention        See [facility] notes     Diagnostic Test Results:  Results for orders placed or performed during the hospital encounter of 05/01/19   IR CVC Tunnel Placement > 5 Yrs of Age    Narrative    PRE-PROCEDURE DIAGNOSIS: End-stage renal disease    POST-PROCEDURE DIAGNOSIS: Same    PROCEDURE: Tunneled central venous catheter placement    Impression    IMPRESSION: Completed image-guided placement of 14.5 Nepali, 23 cm  double lumen tunneled central venous catheter via right internal  jugular vein. Catheter tip in high right atrium. Aspirates and flushes  freely, heparin locked and ready for immediate use. No complication.     CLINICAL HISTORY: End-stage renal disease, patient to start  hemodialysis later today. Tunneled central venous catheter placement  requested.    PERFORMED BY: Dr. Edy Doan    Resident: Misha Feng     Fellow: Donavan Nguyen    CONSENT: Written informed consent was obtained and is documented in  the patient record.    MEDICATIONS: 1. 0.5 mg midazolam IV  2. 25 mcg fentanyl IV  3. 25 mL 1% lidocaine for local anesthesia  4. 200 units heparin per lumen    NURSING: The patient was placed on continuous vital signs monitoring.  Vital signs and sedation were monitored by nursing staff under IR  physician staff supervision.      SEDATION TIME: 40 minutes face-to-face    FLUOROSCOPY TIME: .8 minutes    DESCRIPTION: The right neck and upper chest were prepped and draped in  the usual sterile fashion.      Under ultrasound guidance, the right internal jugular vein was  identified and the overlying skin was anesthetized and skin  dermatotomy was made. Under ultrasound guidance, right internal  jugular venipuncture was made with needle. Image saved documenting  venipuncture and patency.    Needle was exchanged over guidewire for a dilator under fluoroscopic  guidance. Length to right atrium was measured with guidewire.  Guidewire and inner dilator were removed. Wire was  advanced into  inferior vena cava under fluoroscopic guidance and secured.     The anterior chest skin was anesthetized and incision was made after  measurements were taken. A cuffed catheter was subcutaneously tunneled  from the anterior chest incision to the internal jugular venipuncture  site after path of tunnel was anesthetized. The dilator was exchanged  over guidewire for a peel-away sheath. Guidewire was removed. Under  fluoroscopic guidance, the catheter was placed through the peel-away  sheath. Peel-away sheath was removed.      Final catheter position saved. Both catheter lumens adequately  aspirated and flushed. Each lumen was heparin locked. A catheter  retaining suture and sterile dressing were applied. The skin  dermatotomy site overlying the internal jugular venipuncture was  closed with topical adhesive.    COMPLICATIONS: No immediate concerns, the patient remained stable  throughout the procedure and tolerated it well.    ESTIMATED BLOOD LOSS: Minimal    SPECIMENS: None    XR Chest 2 Views    Narrative    EXAMINATION: XR CHEST 2 VW, 5/2/2019 2:45 PM    INDICATION: cough    COMPARISON: 1/26/2018    FINDINGS: PA and lateral radiograph of the chest.  Surgical clips  overlie the left upper quadrant. Left subclavian artery stent.  Hyperexpanded lung fields. Trachea is midline. The cardiomediastinal  silhouette is within normal limits. Small bilateral pleural effusions  with adjacent basilar opacities. Stable right apical scarring.  Degenerative changes of the thoracic spine. No acute osseous  abnormalities. Soft tissue is unremarkable.       Impression    IMPRESSION:   Small bilateral pleural effusions with adjacent atelectasis versus  consolidation.    I have personally reviewed the examination and initial interpretation  and I agree with the findings.    RADHA LOYD, DO   UA with Microscopic reflex to Culture   Result Value Ref Range    Color Urine Straw     Appearance Urine Clear     Glucose Urine  Negative NEG^Negative mg/dL    Bilirubin Urine Negative NEG^Negative    Ketones Urine Negative NEG^Negative mg/dL    Specific Gravity Urine 1.006 1.003 - 1.035    Blood Urine Negative NEG^Negative    pH Urine 6.5 5.0 - 7.0 pH    Protein Albumin Urine 100 (A) NEG^Negative mg/dL    Urobilinogen mg/dL Normal 0.0 - 2.0 mg/dL    Nitrite Urine Negative NEG^Negative    Leukocyte Esterase Urine Negative NEG^Negative    Source Urine     WBC Urine <1 0 - 5 /HPF    RBC Urine <1 0 - 2 /HPF    Squamous Epithelial /HPF Urine <1 0 - 1 /HPF    Mucous Urine Present (A) NEG^Negative /LPF   Potassium   Result Value Ref Range    Potassium 6.4 (HH) 3.4 - 5.3 mmol/L   INR   Result Value Ref Range    INR 0.95 0.86 - 1.14   Potassium   Result Value Ref Range    Potassium 6.2 (HH) 3.4 - 5.3 mmol/L   Glucose by meter   Result Value Ref Range    Glucose 143 (H) 70 - 99 mg/dL   Magnesium   Result Value Ref Range    Magnesium 1.5 (L) 1.6 - 2.3 mg/dL   Glucose by meter   Result Value Ref Range    Glucose 99 70 - 99 mg/dL   Potassium   Result Value Ref Range    Potassium 6.2 (HH) 3.4 - 5.3 mmol/L   Hepatic panel   Result Value Ref Range    Bilirubin Direct <0.1 0.0 - 0.2 mg/dL    Bilirubin Total 0.1 (L) 0.2 - 1.3 mg/dL    Albumin 1.2 (L) 3.4 - 5.0 g/dL    Protein Total 4.5 (L) 6.8 - 8.8 g/dL    Alkaline Phosphatase 77 40 - 150 U/L    ALT 17 0 - 50 U/L    AST 21 0 - 45 U/L   Glucose by meter   Result Value Ref Range    Glucose 152 (H) 70 - 99 mg/dL   Glucose by meter   Result Value Ref Range    Glucose 72 70 - 99 mg/dL   Glucose by meter   Result Value Ref Range    Glucose 96 70 - 99 mg/dL   Basic metabolic panel   Result Value Ref Range    Sodium 144 133 - 144 mmol/L    Potassium 6.0 (H) 3.4 - 5.3 mmol/L    Chloride 119 (H) 94 - 109 mmol/L    Carbon Dioxide 21 20 - 32 mmol/L    Anion Gap 5 3 - 14 mmol/L    Glucose 40 (LL) 70 - 99 mg/dL    Urea Nitrogen 45 (H) 7 - 30 mg/dL    Creatinine 4.74 (H) 0.52 - 1.04 mg/dL    GFR Estimate 8 (L) >60  mL/min/[1.73_m2]    GFR Estimate If Black 10 (L) >60 mL/min/[1.73_m2]    Calcium 7.0 (L) 8.5 - 10.1 mg/dL   Glucose by meter   Result Value Ref Range    Glucose 35 (LL) 70 - 99 mg/dL   Glucose by meter   Result Value Ref Range    Glucose 118 (H) 70 - 99 mg/dL   Basic metabolic panel   Result Value Ref Range    Sodium 145 (H) 133 - 144 mmol/L    Potassium 5.9 (H) 3.4 - 5.3 mmol/L    Chloride 123 (H) 94 - 109 mmol/L    Carbon Dioxide 14 (L) 20 - 32 mmol/L    Anion Gap 8 3 - 14 mmol/L    Glucose 87 70 - 99 mg/dL    Urea Nitrogen 43 (H) 7 - 30 mg/dL    Creatinine 4.77 (H) 0.52 - 1.04 mg/dL    GFR Estimate 8 (L) >60 mL/min/[1.73_m2]    GFR Estimate If Black 10 (L) >60 mL/min/[1.73_m2]    Calcium 7.0 (L) 8.5 -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 period, some results have not been displayed. A complete set of results can be found in Results Review.        ASSESSMENT/PLAN:                                                     (Z09) Hospital discharge follow-up  (primary encounter diagnosis)  Comment: patient admitted for hyperkalemia as above. Recently started dialysis.   Plan: patient will be undergoing dialysis 3 x per week at [facility].  She seems compliant as far as starting this.   She remains confused. There are barriers of hearing and visual impairment at work. I have some concerns about her ability to care for herself and manage her complex illnesses.  Care coordination was consulted and Nikia () will work with her.  We will send the community paramedic to her home to help with medication set up, as she is on multiple medications which were all set up incorrectly.     (N18.5) CKD (chronic kidney disease) stage 5, GFR less than 15 ml/min (H)  Comment: end stage renal disease, just started dialysis.  She needs ongoing Nephrology follow up.   Access site examined and appears clean and dry.  She has been compliant and agreeable to going to dialysis 3 x per week.  Order placed for ensure supplements,   Continue compression socks for edema.  Plan: order for DME            (H91.90) Hearing loss, unspecified hearing loss type, unspecified laterality  Comment: affecting her ability to interact with professionals and comprehend instructions.   Plan: AUDIOLOGY ADULT REFERRAL        Needs to be seen for hearing aids.     (K02.9) Tooth decay  Comment: asks to see dentist, will do referral but advised her to prioritize more immediate medical needs first.   Plan: DENTAL REFERRAL            (Z91.19) Non-compliant patient  Comment: she is non-compliant as far as taking medications as prescribed. Will have community paramedic help with medication set up. She evidently qualifies for home care but in the past she refused and they had concerns about family members doing drugs in the home. [facility] will work with her as well.   Plan: COMMUNITY PARAMEDIC REFERRAL JANIS          EDUARDO ROLAND  [facility]         [facility] for [facility]            (E11.29,  R80.9,  Z79.4) Type 2 diabetes mellitus with microalbuminuria, with long-term current use of insulin (H)  Comment: A1c has been stable lately. Some visual impairment. Needs eye exam.   Plan: OPHTHALMOLOGY ADULT REFERRAL            (L85.3) Dry skin  Comment: requesting lotion.   Plan: ammonium lactate (LAC-HYDRIN) 12 % external         lotion            (J42) Chronic bronchitis, unspecified chronic bronchitis type (H)  Comment: still wheezing/rhonchi but O2 sats stable. States she uses her spiriva inhaler but did not seems to know about Dulera inhaler.   Plan: advised on compliance with both inhalers.     (I10) Hypertension goal BP (blood pressure) < 130/80  Comment: blood pressure stable today.   BP Readings from Last 6 Encounters:   05/08/19 144/72   05/05/19 145/64   05/01/19 (!) 167/97   04/03/19 138/72   02/01/19 130/64   01/04/19 128/58     Plan: would continue with Norvasc, Hydralazine and Metoprolol. Recommended use of wrist cuff 2-3 x per week due to starting dialysis and fluid shifts. Community paramedic to assist in BP management as well.     (I25.2) History of MI (myocardial infarction)  Comment: hx of stent placement.   Plan: no new ischemic symptoms. On ASA, beta blocker and statin.     (E78.5) Hyperlipidemia LDL goal <100  Comment: on statin  Plan: continue to monitor    (F17.200) Tobacco dependence syndrome  Comment: still smoking  Plan: not ready to quit.         Patient Instructions:  Patient Instructions   We will set up your medications today.  We will have the community paramedic come out and help with medication set up and blood pressure.  I am glad you started dialysis.  Please come back in one week and see Joanna.                JUNIOR Alex [facility]

## 2019-05-09 ENCOUNTER — TELEPHONE (OUTPATIENT)
Dept: FAMILY MEDICINE | Facility: CLINIC | Age: 74
End: 2019-05-09

## 2019-05-09 DIAGNOSIS — L85.3 DRY SKIN: ICD-10-CM

## 2019-05-09 RX ORDER — AMMONIUM LACTATE 12 G/100G
LOTION TOPICAL 2 TIMES DAILY
Qty: 225 G | Refills: 0 | Status: CANCELLED | OUTPATIENT
Start: 2019-05-09

## 2019-05-09 NOTE — TELEPHONE ENCOUNTER
Reason for Call:  Medication or medication refill:    Do you use a [facility]?  Name of the pharmacy and phone number for the current request:    [facility] tel: [phone]    Name of the medication requested: ammonium lactate (LAC-HYDRIN) 12 % external lotion    Other request: Pharmacy faxed a request to switch the pt from lotion to cream    Call taken on 5/9/2019 at 9:44 AM by Sakina Fraga

## 2019-05-20 ENCOUNTER — APPOINTMENT (OUTPATIENT)
Dept: GENERAL RADIOLOGY | Facility: CLINIC | Age: 74
DRG: 286 | End: 2019-05-20
Attending: EMERGENCY MEDICINE
Payer: COMMERCIAL

## 2019-05-20 ENCOUNTER — HOSPITAL ENCOUNTER (INPATIENT)
Facility: CLINIC | Age: 74
LOS: 9 days | Discharge: NURSING, REHAB OR LONG-TERM CARE | DRG: 286 | End: 2019-05-30
Attending: EMERGENCY MEDICINE | Admitting: INTERNAL MEDICINE
Payer: COMMERCIAL

## 2019-05-20 DIAGNOSIS — Z98.890 S/P CORONARY ANGIOGRAM: ICD-10-CM

## 2019-05-20 DIAGNOSIS — M62.81 GENERALIZED MUSCLE WEAKNESS: ICD-10-CM

## 2019-05-20 DIAGNOSIS — Z76.89 HEALTH CARE HOME: Primary | ICD-10-CM

## 2019-05-20 DIAGNOSIS — R07.9 CHEST PAIN, UNSPECIFIED TYPE: ICD-10-CM

## 2019-05-20 DIAGNOSIS — J44.9 CHRONIC OBSTRUCTIVE PULMONARY DISEASE, UNSPECIFIED COPD TYPE (H): ICD-10-CM

## 2019-05-20 DIAGNOSIS — G89.29 CHRONIC LOW BACK PAIN WITHOUT SCIATICA, UNSPECIFIED BACK PAIN LATERALITY: ICD-10-CM

## 2019-05-20 DIAGNOSIS — R07.89 ATYPICAL CHEST PAIN: ICD-10-CM

## 2019-05-20 DIAGNOSIS — J90 PLEURAL EFFUSION: ICD-10-CM

## 2019-05-20 DIAGNOSIS — I82.B22: ICD-10-CM

## 2019-05-20 DIAGNOSIS — E78.5 HYPERLIPIDEMIA LDL GOAL <100: ICD-10-CM

## 2019-05-20 DIAGNOSIS — I47.10 SVT (SUPRAVENTRICULAR TACHYCARDIA) (H): ICD-10-CM

## 2019-05-20 DIAGNOSIS — R06.00 DYSPNEA, UNSPECIFIED TYPE: ICD-10-CM

## 2019-05-20 DIAGNOSIS — M54.50 CHRONIC LOW BACK PAIN WITHOUT SCIATICA, UNSPECIFIED BACK PAIN LATERALITY: ICD-10-CM

## 2019-05-20 LAB
ALBUMIN SERPL-MCNC: 1.4 G/DL (ref 3.4–5)
ALP SERPL-CCNC: 125 U/L (ref 40–150)
ALT SERPL W P-5'-P-CCNC: 12 U/L (ref 0–50)
ANION GAP SERPL CALCULATED.3IONS-SCNC: 4 MMOL/L (ref 3–14)
AST SERPL W P-5'-P-CCNC: 21 U/L (ref 0–45)
BASOPHILS # BLD AUTO: 0.1 10E9/L (ref 0–0.2)
BASOPHILS NFR BLD AUTO: 0.6 %
BILIRUB SERPL-MCNC: 0.3 MG/DL (ref 0.2–1.3)
BUN SERPL-MCNC: 16 MG/DL (ref 7–30)
CALCIUM SERPL-MCNC: 7.1 MG/DL (ref 8.5–10.1)
CHLORIDE SERPL-SCNC: 106 MMOL/L (ref 94–109)
CO2 SERPL-SCNC: 33 MMOL/L (ref 20–32)
CREAT SERPL-MCNC: 3.61 MG/DL (ref 0.52–1.04)
DIFFERENTIAL METHOD BLD: ABNORMAL
EOSINOPHIL # BLD AUTO: 0.1 10E9/L (ref 0–0.7)
EOSINOPHIL NFR BLD AUTO: 0.6 %
ERYTHROCYTE [DISTWIDTH] IN BLOOD BY AUTOMATED COUNT: 15 % (ref 10–15)
GFR SERPL CREATININE-BSD FRML MDRD: 12 ML/MIN/{1.73_M2}
GLUCOSE SERPL-MCNC: 94 MG/DL (ref 70–99)
HCT VFR BLD AUTO: 25.8 % (ref 35–47)
HGB BLD-MCNC: 7.9 G/DL (ref 11.7–15.7)
IMM GRANULOCYTES # BLD: 0 10E9/L (ref 0–0.4)
IMM GRANULOCYTES NFR BLD: 0.2 %
LYMPHOCYTES # BLD AUTO: 1 10E9/L (ref 0.8–5.3)
LYMPHOCYTES NFR BLD AUTO: 12.3 %
MCH RBC QN AUTO: 28.4 PG (ref 26.5–33)
MCHC RBC AUTO-ENTMCNC: 30.6 G/DL (ref 31.5–36.5)
MCV RBC AUTO: 93 FL (ref 78–100)
MONOCYTES # BLD AUTO: 0.5 10E9/L (ref 0–1.3)
MONOCYTES NFR BLD AUTO: 6.3 %
NEUTROPHILS # BLD AUTO: 6.8 10E9/L (ref 1.6–8.3)
NEUTROPHILS NFR BLD AUTO: 80 %
NRBC # BLD AUTO: 0 10*3/UL
NRBC BLD AUTO-RTO: 0 /100
PLATELET # BLD AUTO: 372 10E9/L (ref 150–450)
POTASSIUM SERPL-SCNC: 3.9 MMOL/L (ref 3.4–5.3)
PROT SERPL-MCNC: 5.8 G/DL (ref 6.8–8.8)
RBC # BLD AUTO: 2.78 10E12/L (ref 3.8–5.2)
SODIUM SERPL-SCNC: 143 MMOL/L (ref 133–144)
TROPONIN I SERPL-MCNC: 0.04 UG/L (ref 0–0.04)
WBC # BLD AUTO: 8.5 10E9/L (ref 4–11)

## 2019-05-20 PROCEDURE — 85025 COMPLETE CBC W/AUTO DIFF WBC: CPT | Performed by: EMERGENCY MEDICINE

## 2019-05-20 PROCEDURE — 25000128 H RX IP 250 OP 636: Performed by: EMERGENCY MEDICINE

## 2019-05-20 PROCEDURE — 84484 ASSAY OF TROPONIN QUANT: CPT | Performed by: EMERGENCY MEDICINE

## 2019-05-20 PROCEDURE — 25000132 ZZH RX MED GY IP 250 OP 250 PS 637: Performed by: EMERGENCY MEDICINE

## 2019-05-20 PROCEDURE — 25000125 ZZHC RX 250: Performed by: EMERGENCY MEDICINE

## 2019-05-20 PROCEDURE — 71046 X-RAY EXAM CHEST 2 VIEWS: CPT

## 2019-05-20 PROCEDURE — 80053 COMPREHEN METABOLIC PANEL: CPT | Performed by: EMERGENCY MEDICINE

## 2019-05-20 PROCEDURE — 25800030 ZZH RX IP 258 OP 636: Performed by: EMERGENCY MEDICINE

## 2019-05-20 RX ORDER — IPRATROPIUM BROMIDE AND ALBUTEROL SULFATE 2.5; .5 MG/3ML; MG/3ML
3 SOLUTION RESPIRATORY (INHALATION) ONCE
Status: COMPLETED | OUTPATIENT
Start: 2019-05-20 | End: 2019-05-20

## 2019-05-20 RX ORDER — MAGNESIUM SULFATE 1 G/100ML
1 INJECTION INTRAVENOUS ONCE
Status: DISCONTINUED | OUTPATIENT
Start: 2019-05-20 | End: 2019-05-20

## 2019-05-20 RX ORDER — ASPIRIN 81 MG/1
324 TABLET, CHEWABLE ORAL ONCE
Status: COMPLETED | OUTPATIENT
Start: 2019-05-20 | End: 2019-05-20

## 2019-05-20 RX ADMIN — ASPIRIN 81 MG CHEWABLE TABLET 324 MG: 81 TABLET CHEWABLE at 19:37

## 2019-05-20 RX ADMIN — IPRATROPIUM BROMIDE AND ALBUTEROL SULFATE 3 ML: .5; 3 SOLUTION RESPIRATORY (INHALATION) at 19:38

## 2019-05-20 RX ADMIN — CALCIUM GLUCONATE 1 G: 98 INJECTION, SOLUTION INTRAVENOUS at 20:06

## 2019-05-20 RX ADMIN — SODIUM CHLORIDE, POTASSIUM CHLORIDE, SODIUM LACTATE AND CALCIUM CHLORIDE 500 ML: 600; 310; 30; 20 INJECTION, SOLUTION INTRAVENOUS at 19:38

## 2019-05-20 NOTE — LETTER
Health Information Management Services               Recipient:  [facility]         Sender:  BROOKS Gil, MYCHALW  5B   Pager [phone]  Phone [phone]          Date: May 30, 2019  Patient Name:  Kim Anne  Routing Message:    Discharge orders         The documents accompanying this notice contain confidential information belonging to the sender.  This information is intended only for the use of the individual or entity named above.  The authorized recipient of this information is prohibited from disclosing this information to any other party and is required to destroy the information after its stated need has been fulfilled, unless otherwise required by state law.      If you are not the intended recipient, you are hereby notified that any disclosure, copy, distribution or action taken in reliance on the contents of these documents is strictly prohibited.  If you have received this document in error, please return it by fax to [phone] with a note on the cover sheet explaining why you are returning it (e.g. not your patient, not your provider, etc.).  If you need further assistance, please call [employer] Centralized Transcription at [phone].  Documents may also be returned by mail to [employer], , [facility] [address], [place], [place] [address].

## 2019-05-20 NOTE — LETTER
Health Information Management Services               Recipient:  [facility] [address]         Sender:  BROOKS Gil, LSW  5B   Pager [phone]  Phone [phone]          Date: May 30, 2019  Patient Name:  Kim Anne  Routing Message:    Discharge orders        The documents accompanying this notice contain confidential information belonging to the sender.  This information is intended only for the use of the individual or entity named above.  The authorized recipient of this information is prohibited from disclosing this information to any other party and is required to destroy the information after its stated need has been fulfilled, unless otherwise required by state law.      If you are not the intended recipient, you are hereby notified that any disclosure, copy, distribution or action taken in reliance on the contents of these documents is strictly prohibited.  If you have received this document in error, please return it by fax to [phone] with a note on the cover sheet explaining why you are returning it (e.g. not your patient, not your provider, etc.).  If you need further assistance, please call [employer]; to (do) Centralized Transcription at [phone].  Documents may also be returned by mail to [employer], , [address], [place], [place] [address].

## 2019-05-21 ENCOUNTER — NURSE TRIAGE (OUTPATIENT)
Dept: NURSING | Facility: CLINIC | Age: 74
End: 2019-05-21

## 2019-05-21 ENCOUNTER — PATIENT OUTREACH (OUTPATIENT)
Dept: GERIATRIC MEDICINE | Facility: CLINIC | Age: 74
End: 2019-05-21

## 2019-05-21 ENCOUNTER — APPOINTMENT (OUTPATIENT)
Dept: CARDIOLOGY | Facility: CLINIC | Age: 74
DRG: 286 | End: 2019-05-21
Attending: EMERGENCY MEDICINE
Payer: COMMERCIAL

## 2019-05-21 PROBLEM — R07.89 ATYPICAL CHEST PAIN: Status: ACTIVE | Noted: 2019-05-21

## 2019-05-21 LAB
ALBUMIN SERPL-MCNC: 1.3 G/DL (ref 3.4–5)
ANION GAP SERPL CALCULATED.3IONS-SCNC: 5 MMOL/L (ref 3–14)
BASOPHILS # BLD AUTO: 0.1 10E9/L (ref 0–0.2)
BASOPHILS NFR BLD AUTO: 0.7 %
BUN SERPL-MCNC: 20 MG/DL (ref 7–30)
CALCIUM SERPL-MCNC: 7.2 MG/DL (ref 8.5–10.1)
CHLORIDE SERPL-SCNC: 107 MMOL/L (ref 94–109)
CO2 SERPL-SCNC: 31 MMOL/L (ref 20–32)
CREAT SERPL-MCNC: 3.91 MG/DL (ref 0.52–1.04)
DIFFERENTIAL METHOD BLD: ABNORMAL
EOSINOPHIL # BLD AUTO: 0.1 10E9/L (ref 0–0.7)
EOSINOPHIL NFR BLD AUTO: 1.5 %
ERYTHROCYTE [DISTWIDTH] IN BLOOD BY AUTOMATED COUNT: 14.6 % (ref 10–15)
ERYTHROCYTE [DISTWIDTH] IN BLOOD BY AUTOMATED COUNT: 14.8 % (ref 10–15)
GFR SERPL CREATININE-BSD FRML MDRD: 11 ML/MIN/{1.73_M2}
GLUCOSE BLDC GLUCOMTR-MCNC: 119 MG/DL (ref 70–99)
GLUCOSE SERPL-MCNC: 107 MG/DL (ref 70–99)
GRAM STN SPEC: NORMAL
HCT VFR BLD AUTO: 24.6 % (ref 35–47)
HCT VFR BLD AUTO: 26.4 % (ref 35–47)
HGB BLD-MCNC: 7.2 G/DL (ref 11.7–15.7)
HGB BLD-MCNC: 7.8 G/DL (ref 11.7–15.7)
IMM GRANULOCYTES # BLD: 0 10E9/L (ref 0–0.4)
IMM GRANULOCYTES NFR BLD: 0.3 %
INTERPRETATION ECG - MUSE: NORMAL
LYMPHOCYTES # BLD AUTO: 0.9 10E9/L (ref 0.8–5.3)
LYMPHOCYTES NFR BLD AUTO: 10.4 %
Lab: NORMAL
MAGNESIUM SERPL-MCNC: 1.6 MG/DL (ref 1.6–2.3)
MCH RBC QN AUTO: 28.2 PG (ref 26.5–33)
MCH RBC QN AUTO: 28.6 PG (ref 26.5–33)
MCHC RBC AUTO-ENTMCNC: 29.3 G/DL (ref 31.5–36.5)
MCHC RBC AUTO-ENTMCNC: 29.5 G/DL (ref 31.5–36.5)
MCV RBC AUTO: 97 FL (ref 78–100)
MCV RBC AUTO: 97 FL (ref 78–100)
MONOCYTES # BLD AUTO: 0.4 10E9/L (ref 0–1.3)
MONOCYTES NFR BLD AUTO: 4.2 %
NEUTROPHILS # BLD AUTO: 7.2 10E9/L (ref 1.6–8.3)
NEUTROPHILS NFR BLD AUTO: 82.9 %
NRBC # BLD AUTO: 0 10*3/UL
NRBC BLD AUTO-RTO: 0 /100
PHOSPHATE SERPL-MCNC: 3.3 MG/DL (ref 2.5–4.5)
PLATELET # BLD AUTO: 342 10E9/L (ref 150–450)
PLATELET # BLD AUTO: 353 10E9/L (ref 150–450)
POTASSIUM SERPL-SCNC: 4.1 MMOL/L (ref 3.4–5.3)
PROCALCITONIN SERPL-MCNC: 0.33 NG/ML
RBC # BLD AUTO: 2.55 10E12/L (ref 3.8–5.2)
RBC # BLD AUTO: 2.73 10E12/L (ref 3.8–5.2)
SODIUM SERPL-SCNC: 143 MMOL/L (ref 133–144)
SPECIMEN SOURCE: NORMAL
TROPONIN I SERPL-MCNC: 0.05 UG/L (ref 0–0.04)
TSH SERPL DL<=0.005 MIU/L-ACNC: 15.8 MU/L (ref 0.4–4)
WBC # BLD AUTO: 8.7 10E9/L (ref 4–11)
WBC # BLD AUTO: 9.6 10E9/L (ref 4–11)

## 2019-05-21 PROCEDURE — 94640 AIRWAY INHALATION TREATMENT: CPT | Mod: 76 | Performed by: OPTOMETRIST

## 2019-05-21 PROCEDURE — 93005 ELECTROCARDIOGRAM TRACING: CPT | Performed by: EMERGENCY MEDICINE

## 2019-05-21 PROCEDURE — 99222 1ST HOSP IP/OBS MODERATE 55: CPT | Mod: 25 | Performed by: INTERNAL MEDICINE

## 2019-05-21 PROCEDURE — 99285 EMERGENCY DEPT VISIT HI MDM: CPT | Mod: 25 | Performed by: EMERGENCY MEDICINE

## 2019-05-21 PROCEDURE — 94640 AIRWAY INHALATION TREATMENT: CPT | Performed by: OPTOMETRIST

## 2019-05-21 PROCEDURE — 25000128 H RX IP 250 OP 636: Performed by: INTERNAL MEDICINE

## 2019-05-21 PROCEDURE — 84443 ASSAY THYROID STIM HORMONE: CPT | Performed by: EMERGENCY MEDICINE

## 2019-05-21 PROCEDURE — 93010 ELECTROCARDIOGRAM REPORT: CPT | Mod: Z6 | Performed by: EMERGENCY MEDICINE

## 2019-05-21 PROCEDURE — 87077 CULTURE AEROBIC IDENTIFY: CPT | Performed by: PHYSICIAN ASSISTANT

## 2019-05-21 PROCEDURE — 12000004 ZZH R&B IMCU UMMC

## 2019-05-21 PROCEDURE — 40000275 ZZH STATISTIC RCP TIME EA 10 MIN: Performed by: OPTOMETRIST

## 2019-05-21 PROCEDURE — 84145 PROCALCITONIN (PCT): CPT | Performed by: INTERNAL MEDICINE

## 2019-05-21 PROCEDURE — 25000132 ZZH RX MED GY IP 250 OP 250 PS 637: Performed by: PHYSICIAN ASSISTANT

## 2019-05-21 PROCEDURE — 87181 SC STD AGAR DILUTION PER AGT: CPT | Performed by: PHYSICIAN ASSISTANT

## 2019-05-21 PROCEDURE — 63400005 ZZH RX 634: Performed by: INTERNAL MEDICINE

## 2019-05-21 PROCEDURE — 90937 HEMODIALYSIS REPEATED EVAL: CPT

## 2019-05-21 PROCEDURE — 87185 SC STD ENZYME DETCJ PER NZM: CPT | Performed by: PHYSICIAN ASSISTANT

## 2019-05-21 PROCEDURE — 85027 COMPLETE CBC AUTOMATED: CPT | Performed by: INTERNAL MEDICINE

## 2019-05-21 PROCEDURE — 36415 COLL VENOUS BLD VENIPUNCTURE: CPT | Performed by: INTERNAL MEDICINE

## 2019-05-21 PROCEDURE — 83735 ASSAY OF MAGNESIUM: CPT | Performed by: PHYSICIAN ASSISTANT

## 2019-05-21 PROCEDURE — 93005 ELECTROCARDIOGRAM TRACING: CPT

## 2019-05-21 PROCEDURE — 85025 COMPLETE CBC W/AUTO DIFF WBC: CPT | Performed by: INTERNAL MEDICINE

## 2019-05-21 PROCEDURE — 99223 1ST HOSP IP/OBS HIGH 75: CPT | Mod: AI | Performed by: INTERNAL MEDICINE

## 2019-05-21 PROCEDURE — 25000125 ZZHC RX 250: Performed by: PHYSICIAN ASSISTANT

## 2019-05-21 PROCEDURE — 93010 ELECTROCARDIOGRAM REPORT: CPT | Mod: 76 | Performed by: EMERGENCY MEDICINE

## 2019-05-21 PROCEDURE — 84484 ASSAY OF TROPONIN QUANT: CPT | Performed by: EMERGENCY MEDICINE

## 2019-05-21 PROCEDURE — 00000146 ZZHCL STATISTIC GLUCOSE BY METER IP

## 2019-05-21 PROCEDURE — 84145 PROCALCITONIN (PCT): CPT | Performed by: PHYSICIAN ASSISTANT

## 2019-05-21 PROCEDURE — 93308 TTE F-UP OR LMTD: CPT | Mod: 26 | Performed by: EMERGENCY MEDICINE

## 2019-05-21 PROCEDURE — 93308 TTE F-UP OR LMTD: CPT | Performed by: EMERGENCY MEDICINE

## 2019-05-21 PROCEDURE — 93010 ELECTROCARDIOGRAM REPORT: CPT | Mod: ZP | Performed by: INTERNAL MEDICINE

## 2019-05-21 PROCEDURE — 87205 SMEAR GRAM STAIN: CPT | Performed by: PHYSICIAN ASSISTANT

## 2019-05-21 PROCEDURE — 99207 ZZC APP CREDIT; MD BILLING SHARED VISIT: CPT | Performed by: PHYSICIAN ASSISTANT

## 2019-05-21 PROCEDURE — 96360 HYDRATION IV INFUSION INIT: CPT | Performed by: EMERGENCY MEDICINE

## 2019-05-21 PROCEDURE — 25000128 H RX IP 250 OP 636: Performed by: STUDENT IN AN ORGANIZED HEALTH CARE EDUCATION/TRAINING PROGRAM

## 2019-05-21 PROCEDURE — 87070 CULTURE OTHR SPECIMN AEROBIC: CPT | Performed by: PHYSICIAN ASSISTANT

## 2019-05-21 PROCEDURE — 80069 RENAL FUNCTION PANEL: CPT | Performed by: INTERNAL MEDICINE

## 2019-05-21 PROCEDURE — 25000125 ZZHC RX 250: Performed by: EMERGENCY MEDICINE

## 2019-05-21 PROCEDURE — 93306 TTE W/DOPPLER COMPLETE: CPT | Mod: 26 | Performed by: INTERNAL MEDICINE

## 2019-05-21 PROCEDURE — 96361 HYDRATE IV INFUSION ADD-ON: CPT | Performed by: EMERGENCY MEDICINE

## 2019-05-21 PROCEDURE — 93306 TTE W/DOPPLER COMPLETE: CPT

## 2019-05-21 PROCEDURE — 25000128 H RX IP 250 OP 636: Performed by: PHYSICIAN ASSISTANT

## 2019-05-21 PROCEDURE — 84484 ASSAY OF TROPONIN QUANT: CPT | Performed by: PHYSICIAN ASSISTANT

## 2019-05-21 PROCEDURE — 93005 ELECTROCARDIOGRAM TRACING: CPT | Mod: 76 | Performed by: EMERGENCY MEDICINE

## 2019-05-21 RX ORDER — AMLODIPINE BESYLATE 10 MG/1
10 TABLET ORAL ONCE
Status: COMPLETED | OUTPATIENT
Start: 2019-05-21 | End: 2019-05-21

## 2019-05-21 RX ORDER — AMLODIPINE BESYLATE 10 MG/1
10 TABLET ORAL DAILY
Status: CANCELLED | OUTPATIENT
Start: 2019-05-22

## 2019-05-21 RX ORDER — FLUTICASONE PROPIONATE 50 MCG
1 SPRAY, SUSPENSION (ML) NASAL DAILY
Status: DISCONTINUED | OUTPATIENT
Start: 2019-05-21 | End: 2019-05-30 | Stop reason: HOSPADM

## 2019-05-21 RX ORDER — NITROGLYCERIN 0.4 MG/1
0.4 TABLET SUBLINGUAL EVERY 5 MIN PRN
Status: DISCONTINUED | OUTPATIENT
Start: 2019-05-21 | End: 2019-05-30 | Stop reason: HOSPADM

## 2019-05-21 RX ORDER — ATORVASTATIN CALCIUM 40 MG/1
40 TABLET, FILM COATED ORAL DAILY
Status: DISCONTINUED | OUTPATIENT
Start: 2019-05-21 | End: 2019-05-30 | Stop reason: HOSPADM

## 2019-05-21 RX ORDER — BISACODYL 5 MG
10 TABLET, DELAYED RELEASE (ENTERIC COATED) ORAL DAILY PRN
Status: DISCONTINUED | OUTPATIENT
Start: 2019-05-21 | End: 2019-05-30 | Stop reason: HOSPADM

## 2019-05-21 RX ORDER — DOCUSATE SODIUM 100 MG/1
100 CAPSULE, LIQUID FILLED ORAL 2 TIMES DAILY
Status: DISCONTINUED | OUTPATIENT
Start: 2019-05-21 | End: 2019-05-30 | Stop reason: HOSPADM

## 2019-05-21 RX ORDER — IPRATROPIUM BROMIDE AND ALBUTEROL SULFATE 2.5; .5 MG/3ML; MG/3ML
3 SOLUTION RESPIRATORY (INHALATION) ONCE
Status: COMPLETED | OUTPATIENT
Start: 2019-05-21 | End: 2019-05-21

## 2019-05-21 RX ORDER — ACETAMINOPHEN 325 MG/1
650 TABLET ORAL EVERY 4 HOURS PRN
Status: DISCONTINUED | OUTPATIENT
Start: 2019-05-21 | End: 2019-05-30 | Stop reason: HOSPADM

## 2019-05-21 RX ORDER — BISACODYL 5 MG
15 TABLET, DELAYED RELEASE (ENTERIC COATED) ORAL DAILY PRN
Status: DISCONTINUED | OUTPATIENT
Start: 2019-05-21 | End: 2019-05-30 | Stop reason: HOSPADM

## 2019-05-21 RX ORDER — ASPIRIN 81 MG/1
81 TABLET ORAL DAILY
Status: DISCONTINUED | OUTPATIENT
Start: 2019-05-21 | End: 2019-05-30 | Stop reason: HOSPADM

## 2019-05-21 RX ORDER — OXYCODONE HYDROCHLORIDE 10 MG/1
10 TABLET ORAL EVERY 8 HOURS PRN
Status: DISCONTINUED | OUTPATIENT
Start: 2019-05-21 | End: 2019-05-30 | Stop reason: HOSPADM

## 2019-05-21 RX ORDER — FUROSEMIDE 40 MG
80 TABLET ORAL
Status: DISCONTINUED | OUTPATIENT
Start: 2019-05-21 | End: 2019-05-22

## 2019-05-21 RX ORDER — LABETALOL 20 MG/4 ML (5 MG/ML) INTRAVENOUS SYRINGE
Status: DISCONTINUED
Start: 2019-05-21 | End: 2019-05-21 | Stop reason: WASHOUT

## 2019-05-21 RX ORDER — TIOTROPIUM BROMIDE 18 UG/1
18 CAPSULE ORAL; RESPIRATORY (INHALATION) DAILY
Status: DISCONTINUED | OUTPATIENT
Start: 2019-05-21 | End: 2019-05-30 | Stop reason: HOSPADM

## 2019-05-21 RX ORDER — METOPROLOL TARTRATE 25 MG/1
25 TABLET, FILM COATED ORAL DAILY
Status: DISCONTINUED | OUTPATIENT
Start: 2019-05-21 | End: 2019-05-21

## 2019-05-21 RX ORDER — AMOXICILLIN 250 MG
2 CAPSULE ORAL 2 TIMES DAILY
Status: DISCONTINUED | OUTPATIENT
Start: 2019-05-21 | End: 2019-05-30 | Stop reason: HOSPADM

## 2019-05-21 RX ORDER — NYSTATIN 100000 [USP'U]/G
1 POWDER TOPICAL 3 TIMES DAILY PRN
Status: DISCONTINUED | OUTPATIENT
Start: 2019-05-21 | End: 2019-05-21

## 2019-05-21 RX ORDER — AMLODIPINE BESYLATE 10 MG/1
10 TABLET ORAL DAILY
Status: DISCONTINUED | OUTPATIENT
Start: 2019-05-22 | End: 2019-05-22

## 2019-05-21 RX ORDER — NALOXONE HYDROCHLORIDE 0.4 MG/ML
.1-.4 INJECTION, SOLUTION INTRAMUSCULAR; INTRAVENOUS; SUBCUTANEOUS
Status: DISCONTINUED | OUTPATIENT
Start: 2019-05-21 | End: 2019-05-30 | Stop reason: HOSPADM

## 2019-05-21 RX ORDER — HEPARIN SODIUM 10000 [USP'U]/100ML
0-3500 INJECTION, SOLUTION INTRAVENOUS CONTINUOUS
Status: DISCONTINUED | OUTPATIENT
Start: 2019-05-21 | End: 2019-05-23

## 2019-05-21 RX ORDER — POLYETHYLENE GLYCOL 3350 17 G/17G
17 POWDER, FOR SOLUTION ORAL DAILY PRN
Status: DISCONTINUED | OUTPATIENT
Start: 2019-05-21 | End: 2019-05-30 | Stop reason: HOSPADM

## 2019-05-21 RX ORDER — HYDRALAZINE HYDROCHLORIDE 20 MG/ML
10 INJECTION INTRAMUSCULAR; INTRAVENOUS ONCE
Status: COMPLETED | OUTPATIENT
Start: 2019-05-21 | End: 2019-05-21

## 2019-05-21 RX ORDER — SODIUM BICARBONATE 650 MG/1
650 TABLET ORAL 2 TIMES DAILY
Status: DISCONTINUED | OUTPATIENT
Start: 2019-05-21 | End: 2019-05-30 | Stop reason: HOSPADM

## 2019-05-21 RX ORDER — AMOXICILLIN 250 MG
1 CAPSULE ORAL 2 TIMES DAILY
Status: DISCONTINUED | OUTPATIENT
Start: 2019-05-21 | End: 2019-05-30 | Stop reason: HOSPADM

## 2019-05-21 RX ORDER — AMMONIUM LACTATE 12 G/100G
LOTION TOPICAL 2 TIMES DAILY
Status: DISCONTINUED | OUTPATIENT
Start: 2019-05-21 | End: 2019-05-30 | Stop reason: HOSPADM

## 2019-05-21 RX ORDER — HYDRALAZINE HYDROCHLORIDE 25 MG/1
25 TABLET, FILM COATED ORAL 2 TIMES DAILY
Status: DISCONTINUED | OUTPATIENT
Start: 2019-05-21 | End: 2019-05-21

## 2019-05-21 RX ORDER — POLYETHYLENE GLYCOL 3350 17 G/17G
17 POWDER, FOR SOLUTION ORAL DAILY PRN
Status: DISCONTINUED | OUTPATIENT
Start: 2019-05-21 | End: 2019-05-23

## 2019-05-21 RX ORDER — LIDOCAINE 40 MG/G
CREAM TOPICAL
Status: DISCONTINUED | OUTPATIENT
Start: 2019-05-21 | End: 2019-05-23

## 2019-05-21 RX ORDER — PETROLATUM,WHITE 41 %
1 OINTMENT (GRAM) TOPICAL
Status: DISCONTINUED | OUTPATIENT
Start: 2019-05-21 | End: 2019-05-30 | Stop reason: HOSPADM

## 2019-05-21 RX ORDER — ALBUTEROL SULFATE 90 UG/1
2 AEROSOL, METERED RESPIRATORY (INHALATION) EVERY 6 HOURS PRN
Status: DISCONTINUED | OUTPATIENT
Start: 2019-05-21 | End: 2019-05-30 | Stop reason: HOSPADM

## 2019-05-21 RX ORDER — IPRATROPIUM BROMIDE AND ALBUTEROL SULFATE 2.5; .5 MG/3ML; MG/3ML
3 SOLUTION RESPIRATORY (INHALATION)
Status: DISCONTINUED | OUTPATIENT
Start: 2019-05-21 | End: 2019-05-25

## 2019-05-21 RX ORDER — BISACODYL 5 MG
5 TABLET, DELAYED RELEASE (ENTERIC COATED) ORAL DAILY PRN
Status: DISCONTINUED | OUTPATIENT
Start: 2019-05-21 | End: 2019-05-30 | Stop reason: HOSPADM

## 2019-05-21 RX ORDER — HYDROXYZINE HYDROCHLORIDE 10 MG/1
10 TABLET, FILM COATED ORAL 3 TIMES DAILY PRN
Status: DISCONTINUED | OUTPATIENT
Start: 2019-05-21 | End: 2019-05-30 | Stop reason: HOSPADM

## 2019-05-21 RX ADMIN — EPOETIN ALFA 4000 UNITS: 10000 SOLUTION INTRAVENOUS; SUBCUTANEOUS at 16:19

## 2019-05-21 RX ADMIN — DOCUSATE SODIUM 100 MG: 100 CAPSULE, LIQUID FILLED ORAL at 20:14

## 2019-05-21 RX ADMIN — SODIUM CHLORIDE 300 ML: 9 INJECTION, SOLUTION INTRAVENOUS at 14:10

## 2019-05-21 RX ADMIN — IRON SUCROSE 100 MG: 20 INJECTION, SOLUTION INTRAVENOUS at 17:04

## 2019-05-21 RX ADMIN — IPRATROPIUM BROMIDE AND ALBUTEROL SULFATE 3 ML: .5; 3 SOLUTION RESPIRATORY (INHALATION) at 19:48

## 2019-05-21 RX ADMIN — AMLODIPINE BESYLATE 10 MG: 10 TABLET ORAL at 17:03

## 2019-05-21 RX ADMIN — MELATONIN 2000 UNITS: at 20:49

## 2019-05-21 RX ADMIN — SODIUM BICARBONATE 650 MG TABLET 650 MG: at 20:14

## 2019-05-21 RX ADMIN — ACETAMINOPHEN 650 MG: 325 TABLET, FILM COATED ORAL at 20:18

## 2019-05-21 RX ADMIN — FLUTICASONE PROPIONATE 1 SPRAY: 50 SPRAY, METERED NASAL at 20:18

## 2019-05-21 RX ADMIN — LEVOTHYROXINE SODIUM 200 MCG: 100 TABLET ORAL at 20:13

## 2019-05-21 RX ADMIN — ATORVASTATIN CALCIUM 40 MG: 40 TABLET, FILM COATED ORAL at 20:13

## 2019-05-21 RX ADMIN — IPRATROPIUM BROMIDE AND ALBUTEROL SULFATE 3 ML: .5; 3 SOLUTION RESPIRATORY (INHALATION) at 07:43

## 2019-05-21 RX ADMIN — SENNOSIDES AND DOCUSATE SODIUM 1 TABLET: 8.6; 5 TABLET ORAL at 20:14

## 2019-05-21 RX ADMIN — HYDRALAZINE HYDROCHLORIDE 10 MG: 20 INJECTION, SOLUTION INTRAMUSCULAR; INTRAVENOUS at 16:36

## 2019-05-21 RX ADMIN — Medication: at 20:25

## 2019-05-21 RX ADMIN — SODIUM CHLORIDE 250 ML: 9 INJECTION, SOLUTION INTRAVENOUS at 14:11

## 2019-05-21 RX ADMIN — TIOTROPIUM BROMIDE 18 MCG: 18 CAPSULE ORAL; RESPIRATORY (INHALATION) at 20:19

## 2019-05-21 RX ADMIN — ASPIRIN 81 MG: 81 TABLET, COATED ORAL at 20:18

## 2019-05-21 RX ADMIN — HEPARIN SODIUM 800 UNITS/HR: 10000 INJECTION, SOLUTION INTRAVENOUS at 20:38

## 2019-05-21 ASSESSMENT — PAIN DESCRIPTION - DESCRIPTORS
DESCRIPTORS: HEADACHE
DESCRIPTORS: HEADACHE

## 2019-05-21 ASSESSMENT — ACTIVITIES OF DAILY LIVING (ADL): ADLS_ACUITY_SCORE: 19

## 2019-05-21 NOTE — ED NOTES
Patient appears to be sleeping. Her work of breathing has improved not in any distress currently. Waiting for EMS arrival for transport to [place] ED.

## 2019-05-21 NOTE — ED PROVIDER NOTES
History     Chief Complaint   Patient presents with     Chest Pain     Pt states she has been having chest pain since yesterday     HPI  Kim Anne is a 73 year old female who has past medical history of end-stage renal disease on dialysis associated with anemia, high blood pressure, diabetes, current smoking with COPD, history of tuberculosis treated in the 70s, hypoparathyroidism with hypocalcemia, and hypothyroidism.    Chest tightness and dyspnea since yesterday, resolved overnight and then reoccurred.  Unsure if exertional.  It was associated with generalized weakness and dizziness.  She said she had similar episodes recently and fell twice but did not lose consciousness or hit her head.  She says that the falls were due to general weakness and not syncope.    She is recently started on dialysis for end-stage renal disease.  Her last dialysis was Saturday (2 days ago, today is Monday).  On arrival to the ED she was asymptomatic with no pain or dyspnea.  She denies fever.  She has a chronic cough that has not changed for the past month.  She has nausea but no vomiting.  She has no leg swelling and no history of blood clots.  She has no recent travel.  Per her son she has had decreased p.o. intake for the past month.  She was discharged on 5/5 of this year after an admission for hyperkalemia.  Dialysis was initiated at this time.  She has a history of kidney donation to her brother has a single kidney with uncontrolled diabetes and diabetic nephropathy.    I have reviewed the Medications, Allergies, Past Medical and Surgical History, and Social History in the Epic system.    Review of Systems   Constitutional: Negative for fever.   HENT: Negative for congestion.    Eyes: Negative for visual disturbance.   Respiratory: Positive for shortness of breath.    Cardiovascular: Positive for chest pain.   Gastrointestinal: Positive for nausea. Negative for vomiting.   Musculoskeletal: Negative for back pain.    Skin: Negative for rash.   Neurological: Positive for dizziness and weakness (generalized).   Psychiatric/Behavioral: Negative for agitation.       Physical Exam   BP: 100/62  Pulse: 70  Temp: 98.3  F (36.8  C)  Resp: 12  Weight: 68 kg (150 lb)  SpO2: 93 %      Physical Exam   Constitutional: She is oriented to person, place, and time. She appears well-developed and well-nourished.   Chronically ill appearing   HENT:   Head: Normocephalic and atraumatic.   Mouth/Throat: Oropharynx is clear and moist.   Eyes: Pupils are equal, round, and reactive to light. EOM are normal.   Neck: Normal range of motion.   Cardiovascular: Normal rate and regular rhythm.   Pulmonary/Chest: Effort normal.   Scattered wheezing   Abdominal: Soft. There is no tenderness.   Musculoskeletal: Normal range of motion. She exhibits no edema.   Neurological: She is alert and oriented to person, place, and time. No cranial nerve deficit or sensory deficit.   Skin: Skin is warm and dry.       ED Course        Procedures         Results for orders placed during the hospital encounter of 05/20/19   POC US ECHO LIMITED    Impression Limited Bedside Cardiac Ultrasound, performed and interpreted by me.   Indication: Shortness of Breath.  Parasternal long axis, parasternal short axis, apical 4 chamber, subcostal and BL lung views were acquired.   Image quality was satisfactory.    Findings:    Global left ventricular function appears intact.  Chambers do not appear dilated.  There is no evidence of free fluid within the pericardium.  No B lines  Large bilateral pleural effusions    IMPRESSION: Grossly normal left ventricular function and chamber size.  No pericardial effusion.  Large pleural effusions bilaterally.              EKG Interpretation:      Interpreted by Janet Snell  Time reviewed: 1903  Symptoms at time of EKG: R shoulder pain   Rhythm: normal sinus   Rate: Tachycardia  Axis: Normal  Ectopy: premature ventricular contractions  (infrequent)  Conduction: QTC prolongation to 522  ST Segments/ T Waves: No ST-T wave changes  Q Waves: II, III and aVf  Comparison to prior: No old EKG available    Clinical Impression: sinus tachycardia         EKG Interpretation:      Interpreted by Janet Snell  Time reviewed:1945   Symptoms at time of EKG: none   Rhythm: normal sinus   Rate: normal  Axis: NORMAL  Ectopy: none  Conduction: normal  ST Segments/ T Waves: No ST-T wave changes  Q Waves: none  Comparison to prior: sinus tachycardia resolved, QTC improved    Clinical Impression: normal EKG           EKG Interpretation:      Interpreted by Janet Snell  Time reviewed:2155   Symptoms at time of EKG: none (chest pain episode with tachycardia had resolved)   Rhythm: normal sinus   Rate: normal  Axis: NORMAL  Ectopy: frequent PACs  Conduction: normal  ST Segments/ T Waves: No ST-T wave changes  Q Waves: none  Comparison to prior: sinus tachycardia resolved    Clinical Impression: normal EKG                  Critical Care time:  none             Labs Ordered and Resulted from Time of ED Arrival Up to the Time of Departure from the ED - No data to display         Assessments & Plan (with Medical Decision Making)     Ms Anne is a 74 yo woman with multiple medical problems including ESRD recently started on HD via tunneled catheter, COPD and active smoking who presents with chest pain and dyspnea since yesterday.  DDX includes ACS, pulmonary edema 2/2 volume overload or CHF, pleural effusion, pneumonia, PE, arrhythmia, electrolyte abnormality, COPD exacerbation.  She arrived with some pain and an EKG which showed a run of sinus tachycardia and a prolonged QTc.  Calcium was given empirically as she has a history of hypocalcemia secondary to hyperparathyroidism.  She was saturating at 90% on room air with unknown baseline so was put on 2L NC.  POCUS showed large pleural effusions bilaterally which are likely 2/2 renal failure as LV function appears  preserved.  Patient has no history of PE/DVT, no leg swelling, and intermittent chest pain, so PE is lower on my differential.  Ordered CBC, CMP, troponin, CXR, Also will give nebs for possible COPD exacerbation.  Patient had a recent positive quantiferon gold test.  She was treated for TB decades ago however due to her new respiratory symptoms she was placed on airbourne precautions.    [facility] note:  No electrolyte abnormalities, troponin detectable but WNL, anemia at baseline.  Patient experienced a reoccurrence of her symptoms and during that time developed sinus tachycardia.  It lasted about 1-2 minutes and resolved, and repeat EKG shows sinus rhythm with frequent PACs, NOT atrial fibrillation as P waves are present so patient was not anticoagulated.  Patient was admitted for continued cardiac w/u and monitoring.    Janet Snell MD on 5/22/2019 at 8:55 PM       I have reviewed the nursing notes.    I have reviewed the findings, diagnosis, plan and need for follow up with the patient.       Medication List      There are no discharge medications for this visit.         Final diagnoses:   None       5/20/2019   [facility], [place], EMERGENCY DEPARTMENT     Janet Snell MD  05/22/19 2055       Janet Snell MD  05/22/19 2057
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
## 2019-05-21 NOTE — PROGRESS NOTES
"HEMODIALYSIS TREATMENT NOTE    Date: 5/21/2019  Time: 1715    Data:  Pre Wt:   66.6 kg  Desired Wt: 65 kg  Post Wt:    Weight gain:     Weight change:     Ultrafiltration - Post Run Net Total Removed (mL):  1600 mL  Ultrafiltration - Post Run Net Total Gain (mL):  0  Vascular Access Status: Yes, secured and visible  Dialyzer Rinse:  light  Total Blood Volume Processed:  69.7 L  Total Dialysis (Treatment) Time:  3 hours    Lab: Troponin    Assessment/Interventions:  Pt dialyzed in room 4432 for negative airflow.  3 hour HD run via right internal jugular tunneled catheter.  Blood flow 400 mL/min.  1.6 kg removed to reach dry weight of 65 kg.  Pt given Hydralazine and  Norvasc during run for elevated BP per Primary Team.   Pt sinus rhythm with short runs (up to 7 beats) V-tach.  EKG completed.  Pt's BP did not change with V-tach.  Pt states that she can feel these beats and describes them as \"pounding\".  Pt c/o chest pain once that lasted only a few minutes and resolved without intervention.  Pt c/o SOB at times.  Pt remained on 3L O2 via NC. SPO2 mid-90's.  Drsg to dialysis catheter changed.  Pt given Epogen and Iron during run.   Primary Team and Cardiology Team saw pt in the Dialysis Unit.  Pt transported to Unit 6B by this RN, masked, on O2 and monitor.  Bedside report given to 6B RN after this RN attempted to give report by phone without success.       Plan:  Continue with plan of care.  Next run per Renal Team.    "

## 2019-05-21 NOTE — PROGRESS NOTES
[facility] Care Coordination Contact  CC received notification of Emergency Room visit.  ER visit occurred on 5/20/19 at [facility] with Dx of Chest pain. Member was then transferred to [place] on 5/21 to complete dialysis treatment.   CC contacted [place] ED to determine if member will be admitted or discharged home. ED SW was unsure of member's plan after dialysis she will call and update once a plan is established. Contact information given to ED SW for this CC and also ongoing CC.   Ariana Neal RN  [facility]  [phone]

## 2019-05-21 NOTE — ED NOTES
Patient transferred from [facility] ED while awaiting medicine bed.  Patient is alert and oriented in no respiratory distress.  She denies any pain currently.  There was a note from ED nursing upon her arrival to the [facility] that she is being treated for active TB however on chart review I do not see any documentation of this including from her recent primary care visit.  Her medications do not show any evidence of treatment for TB at this time.  Her chest x-ray does not show any upper lobe opacities.  She does have a history of TB which was completed in the 1970s at which time she had a full year of treatment and reports she was compliant with her medication.  She was seen by infectious disease previously without any signs of TB or latent TB at that time.  Patient did have a repeat Quantiferon gold at the beginning of May which was again positive, previously thought to be due to prior disease.  Discussed with medicine and will determine if Negative airflow room and airborne precautions are still needed.         Yakelin Jim MD  05/21/19 2851
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
## 2019-05-21 NOTE — ED NOTES
Patient signed out to me at the end of my partner's shift.  Patient is being actively treated for TB and requires negative airflow room.  Admission status to the [facility] is delayed secondary to this requirement.  Meanwhile the patient is being ruled out for her chest pain and has slightly positive troponins most likely secondary to her chronic renal disease.  Patient is a new dialysis patient and is on a Tuesday Thursday Saturday schedule.  Patient is due for dialysis today which cannot be done on the [facility].  A negative airflow room was reserved in the [facility] [place] ED and the patient will be transferred there where she can have a formal echocardiogram to compare with her previous echo (for WMA) and dialysis can be done from there as needed.    Echocardiogram ordered and tech informed of patient transfer.    Medicine team informed of the patient's transfer to the [place] ED.    Case was discussed with the [place] ED physician and charge nurse.    MD Jason Ferraro Ford Christian, MD  05/21/19 3148

## 2019-05-21 NOTE — ED NOTES
Bed: ED11  Expected date:   Expected time:   Means of arrival:   Comments:  [place] transfer. Kim MckeonKenzie.  [phone]. Tx for TB. Medicine for atypical CP. No medicine bed available. Needs Echo and dialysis.
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
## 2019-05-21 NOTE — ED NOTES
Writer cleaning charting up, charting end time on LR bolus from 1938 and Calcium gluconate @ 2006 now. I was not the RN at the time infusion ended on evening shift 5/20/19, but there was not infusion at 0720 when I took assignment and assessed pt.

## 2019-05-22 LAB
ABO + RH BLD: NORMAL
ABO + RH BLD: NORMAL
ALBUMIN SERPL-MCNC: 1.2 G/DL (ref 3.4–5)
ALP SERPL-CCNC: 102 U/L (ref 40–150)
ALT SERPL W P-5'-P-CCNC: 10 U/L (ref 0–50)
ANION GAP SERPL CALCULATED.3IONS-SCNC: 3 MMOL/L (ref 3–14)
AST SERPL W P-5'-P-CCNC: 16 U/L (ref 0–45)
BILIRUB SERPL-MCNC: 0.1 MG/DL (ref 0.2–1.3)
BLD GP AB SCN SERPL QL: NORMAL
BLD PROD TYP BPU: NORMAL
BLD PROD TYP BPU: NORMAL
BLD UNIT ID BPU: 0
BLOOD BANK CMNT PATIENT-IMP: NORMAL
BLOOD PRODUCT CODE: NORMAL
BPU ID: NORMAL
BUN SERPL-MCNC: 10 MG/DL (ref 7–30)
CALCIUM SERPL-MCNC: 7.6 MG/DL (ref 8.5–10.1)
CHLORIDE SERPL-SCNC: 106 MMOL/L (ref 94–109)
CO2 SERPL-SCNC: 30 MMOL/L (ref 20–32)
CREAT SERPL-MCNC: 2.24 MG/DL (ref 0.52–1.04)
ERYTHROCYTE [DISTWIDTH] IN BLOOD BY AUTOMATED COUNT: 14.6 % (ref 10–15)
GFR SERPL CREATININE-BSD FRML MDRD: 21 ML/MIN/{1.73_M2}
GLUCOSE BLDC GLUCOMTR-MCNC: 128 MG/DL (ref 70–99)
GLUCOSE SERPL-MCNC: 109 MG/DL (ref 70–99)
HCT VFR BLD AUTO: 24 % (ref 35–47)
HGB BLD-MCNC: 7 G/DL (ref 11.7–15.7)
INR PPP: 0.99 (ref 0.86–1.14)
INTERPRETATION ECG - MUSE: NORMAL
LMWH PPP CHRO-ACNC: 0.11 IU/ML
LMWH PPP CHRO-ACNC: 0.3 IU/ML
MAGNESIUM SERPL-MCNC: 1.6 MG/DL (ref 1.6–2.3)
MCH RBC QN AUTO: 28.2 PG (ref 26.5–33)
MCHC RBC AUTO-ENTMCNC: 29.2 G/DL (ref 31.5–36.5)
MCV RBC AUTO: 97 FL (ref 78–100)
NUM BPU REQUESTED: 1
PHOSPHATE SERPL-MCNC: 1.9 MG/DL (ref 2.5–4.5)
PLATELET # BLD AUTO: 327 10E9/L (ref 150–450)
POTASSIUM SERPL-SCNC: 3.8 MMOL/L (ref 3.4–5.3)
PROT SERPL-MCNC: 5 G/DL (ref 6.8–8.8)
RBC # BLD AUTO: 2.48 10E12/L (ref 3.8–5.2)
SODIUM SERPL-SCNC: 139 MMOL/L (ref 133–144)
SPECIMEN EXP DATE BLD: NORMAL
TRANSFUSION STATUS PATIENT QL: NORMAL
TRANSFUSION STATUS PATIENT QL: NORMAL
WBC # BLD AUTO: 7.6 10E9/L (ref 4–11)

## 2019-05-22 PROCEDURE — 99232 SBSQ HOSP IP/OBS MODERATE 35: CPT | Mod: GC | Performed by: INTERNAL MEDICINE

## 2019-05-22 PROCEDURE — 86923 COMPATIBILITY TEST ELECTRIC: CPT | Performed by: PHYSICIAN ASSISTANT

## 2019-05-22 PROCEDURE — 94640 AIRWAY INHALATION TREATMENT: CPT | Mod: 76

## 2019-05-22 PROCEDURE — 87206 SMEAR FLUORESCENT/ACID STAI: CPT | Performed by: PHYSICIAN ASSISTANT

## 2019-05-22 PROCEDURE — 85610 PROTHROMBIN TIME: CPT | Performed by: INTERNAL MEDICINE

## 2019-05-22 PROCEDURE — 84100 ASSAY OF PHOSPHORUS: CPT | Performed by: PHYSICIAN ASSISTANT

## 2019-05-22 PROCEDURE — 86850 RBC ANTIBODY SCREEN: CPT | Performed by: PHYSICIAN ASSISTANT

## 2019-05-22 PROCEDURE — 00000146 ZZHCL STATISTIC GLUCOSE BY METER IP

## 2019-05-22 PROCEDURE — 25000125 ZZHC RX 250: Performed by: PHYSICIAN ASSISTANT

## 2019-05-22 PROCEDURE — 12000004 ZZH R&B IMCU UMMC

## 2019-05-22 PROCEDURE — 87015 SPECIMEN INFECT AGNT CONCNTJ: CPT | Performed by: PHYSICIAN ASSISTANT

## 2019-05-22 PROCEDURE — 25800030 ZZH RX IP 258 OP 636: Performed by: INTERNAL MEDICINE

## 2019-05-22 PROCEDURE — 87116 MYCOBACTERIA CULTURE: CPT | Performed by: PHYSICIAN ASSISTANT

## 2019-05-22 PROCEDURE — 36415 COLL VENOUS BLD VENIPUNCTURE: CPT | Performed by: PHYSICIAN ASSISTANT

## 2019-05-22 PROCEDURE — 85027 COMPLETE CBC AUTOMATED: CPT | Performed by: PHYSICIAN ASSISTANT

## 2019-05-22 PROCEDURE — 40000556 ZZH STATISTIC PERIPHERAL IV START W US GUIDANCE

## 2019-05-22 PROCEDURE — 25000132 ZZH RX MED GY IP 250 OP 250 PS 637: Performed by: PHYSICIAN ASSISTANT

## 2019-05-22 PROCEDURE — 80053 COMPREHEN METABOLIC PANEL: CPT | Performed by: PHYSICIAN ASSISTANT

## 2019-05-22 PROCEDURE — 83735 ASSAY OF MAGNESIUM: CPT | Performed by: PHYSICIAN ASSISTANT

## 2019-05-22 PROCEDURE — 25000132 ZZH RX MED GY IP 250 OP 250 PS 637: Performed by: INTERNAL MEDICINE

## 2019-05-22 PROCEDURE — 86901 BLOOD TYPING SEROLOGIC RH(D): CPT | Performed by: PHYSICIAN ASSISTANT

## 2019-05-22 PROCEDURE — 94640 AIRWAY INHALATION TREATMENT: CPT

## 2019-05-22 PROCEDURE — G0463 HOSPITAL OUTPT CLINIC VISIT: HCPCS

## 2019-05-22 PROCEDURE — 36415 COLL VENOUS BLD VENIPUNCTURE: CPT | Performed by: INTERNAL MEDICINE

## 2019-05-22 PROCEDURE — 40000275 ZZH STATISTIC RCP TIME EA 10 MIN

## 2019-05-22 PROCEDURE — 86900 BLOOD TYPING SEROLOGIC ABO: CPT | Performed by: PHYSICIAN ASSISTANT

## 2019-05-22 PROCEDURE — 25000128 H RX IP 250 OP 636: Performed by: STUDENT IN AN ORGANIZED HEALTH CARE EDUCATION/TRAINING PROGRAM

## 2019-05-22 PROCEDURE — 85520 HEPARIN ASSAY: CPT | Performed by: INTERNAL MEDICINE

## 2019-05-22 PROCEDURE — 25000125 ZZHC RX 250: Performed by: INTERNAL MEDICINE

## 2019-05-22 PROCEDURE — 99233 SBSQ HOSP IP/OBS HIGH 50: CPT | Performed by: INTERNAL MEDICINE

## 2019-05-22 PROCEDURE — P9016 RBC LEUKOCYTES REDUCED: HCPCS | Performed by: PHYSICIAN ASSISTANT

## 2019-05-22 RX ORDER — METOPROLOL SUCCINATE 25 MG/1
25 TABLET, EXTENDED RELEASE ORAL DAILY
Status: ON HOLD | COMMUNITY
End: 2019-05-30

## 2019-05-22 RX ADMIN — TIOTROPIUM BROMIDE 18 MCG: 18 CAPSULE ORAL; RESPIRATORY (INHALATION) at 08:32

## 2019-05-22 RX ADMIN — LEVOTHYROXINE SODIUM 200 MCG: 100 TABLET ORAL at 08:30

## 2019-05-22 RX ADMIN — FLUTICASONE PROPIONATE 1 SPRAY: 50 SPRAY, METERED NASAL at 08:28

## 2019-05-22 RX ADMIN — ASPIRIN 81 MG: 81 TABLET, COATED ORAL at 08:29

## 2019-05-22 RX ADMIN — DOCUSATE SODIUM 100 MG: 100 CAPSULE, LIQUID FILLED ORAL at 08:29

## 2019-05-22 RX ADMIN — ATORVASTATIN CALCIUM 40 MG: 40 TABLET, FILM COATED ORAL at 08:30

## 2019-05-22 RX ADMIN — HEPARIN SODIUM 950 UNITS/HR: 10000 INJECTION, SOLUTION INTRAVENOUS at 20:45

## 2019-05-22 RX ADMIN — FLUTICASONE FUROATE AND VILANTEROL TRIFENATATE 1 PUFF: 100; 25 POWDER RESPIRATORY (INHALATION) at 08:27

## 2019-05-22 RX ADMIN — ACETAMINOPHEN 650 MG: 325 TABLET, FILM COATED ORAL at 04:00

## 2019-05-22 RX ADMIN — ACETAMINOPHEN 650 MG: 325 TABLET, FILM COATED ORAL at 14:43

## 2019-05-22 RX ADMIN — SODIUM BICARBONATE 650 MG TABLET 650 MG: at 20:44

## 2019-05-22 RX ADMIN — Medication: at 08:35

## 2019-05-22 RX ADMIN — MELATONIN 2000 UNITS: at 08:29

## 2019-05-22 RX ADMIN — DOCUSATE SODIUM 100 MG: 100 CAPSULE, LIQUID FILLED ORAL at 20:44

## 2019-05-22 RX ADMIN — SODIUM PHOSPHATE, MONOBASIC, MONOHYDRATE AND SODIUM PHOSPHATE, DIBASIC, ANHYDROUS 15 MMOL: 276; 142 INJECTION, SOLUTION INTRAVENOUS at 06:47

## 2019-05-22 RX ADMIN — SENNOSIDES AND DOCUSATE SODIUM 2 TABLET: 8.6; 5 TABLET ORAL at 20:44

## 2019-05-22 RX ADMIN — ACETAMINOPHEN 650 MG: 325 TABLET, FILM COATED ORAL at 08:47

## 2019-05-22 RX ADMIN — IPRATROPIUM BROMIDE AND ALBUTEROL SULFATE 3 ML: .5; 3 SOLUTION RESPIRATORY (INHALATION) at 20:08

## 2019-05-22 RX ADMIN — SODIUM BICARBONATE 650 MG TABLET 650 MG: at 08:29

## 2019-05-22 RX ADMIN — Medication: at 20:44

## 2019-05-22 RX ADMIN — IPRATROPIUM BROMIDE AND ALBUTEROL SULFATE 3 ML: .5; 3 SOLUTION RESPIRATORY (INHALATION) at 08:09

## 2019-05-22 RX ADMIN — SENNOSIDES AND DOCUSATE SODIUM 2 TABLET: 8.6; 5 TABLET ORAL at 08:47

## 2019-05-22 RX ADMIN — IPRATROPIUM BROMIDE AND ALBUTEROL SULFATE 3 ML: .5; 3 SOLUTION RESPIRATORY (INHALATION) at 11:53

## 2019-05-22 ASSESSMENT — ACTIVITIES OF DAILY LIVING (ADL)
ADLS_ACUITY_SCORE: 18
ADLS_ACUITY_SCORE: 19
ADLS_ACUITY_SCORE: 18
ADLS_ACUITY_SCORE: 19

## 2019-05-22 ASSESSMENT — ENCOUNTER SYMPTOMS
AGITATION: 0
VOMITING: 0
BACK PAIN: 0
DIZZINESS: 1
NAUSEA: 1
FEVER: 0
SHORTNESS OF BREATH: 1
WEAKNESS: 1

## 2019-05-22 NOTE — PROVIDER NOTIFICATION
Provider notified at job code 1221 of run of SVT. HR in 130s.   Currently in NSR, HR in 60s-70s.   No new orders. Will continue to monitor.

## 2019-05-22 NOTE — PROGRESS NOTES
Care Coordinator - Discharge Planning    Admission Date/Time:  5/20/2019  Attending MD:  Tomás Porras MD     Data  Date of initial CC assessment:  5/22/19  Chart reviewed, discussed with interdisciplinary team.   Patient was admitted for:   1. Generalized muscle weakness    2. Chest pain, unspecified type    3. Dyspnea, unspecified type    4. Pleural effusion      Assessment   Concerns with insurance coverage for discharge needs: None.  Current Living Situation: Patient lives with family-son  Support System: Supportive and Involved  Services Involved: Dialysis Services  Transportation at Discharge: Car and Family or friend will provide  Barriers to Discharge: pending medical stability and safe d/c plan    Coordination of Care and Referrals:   Met with patient to discuss home situation and potential discharge needs. She reports (and I have confirmed with center) that she dialyzes at [facility] ([phone]/Fax: [phone]) T/Th/Sat. She reports that she lives with her son and had no home services prior to admission. Per MD team, she had + Quant Gold  prior to starting at dialysis center. Per record patient has a [employer] ETTA Mai [phone].  Plan at this time is a heart cath tomorrow.      Plan  Anticipated Discharge Date: TBD  Anticipated Discharge Plan: Home with resumption of dialysis    Pedro Echols, NICOLE Care Coordinator [phone]

## 2019-05-22 NOTE — PLAN OF CARE
S/B: Pt slept well between cares, bladder scanned at 04 for 270cc, pt wants to keep bedpan by side and try again. RN explained we can try again at 06am. Only voids a small amt per pt since on dialysis. Phosphorous replacement ordered. Heparin gtt not therapeutic, increased per orders. Recheck at 1030am.    A: A&Ox4. VSS. SR. Afebrile. Right headache pain; tylenol and heat pack placed.  No nausea. Electrolytes WNL.    I/O this shift:  In: 50 [P.O.:50]  Out: -     Temp:  [98.2  F (36.8  C)-98.8  F (37.1  C)] 98.3  F (36.8  C)  Pulse:  [59-93] 73  Heart Rate:  [58-89] 67  Resp:  [12-29] 18  BP: (103-198)/(51-98) 105/63  SpO2:  [92 %-99 %] 92 %     R: NPO for possible cardiac procedure today. Continue with POC. Notify primary team with changes.

## 2019-05-22 NOTE — PROGRESS NOTES
SPIRITUAL HEALTH SERVICES  SPIRITUAL ASSESSMENT Progress Note  [facility] ([place]) 6B     REFERRAL SOURCE: Patient request    I met with patient Kim Anne to introduce the role of SHS. Kim requests a visit from a . She would like to receive Congregational Communion, in addition to a rosary and Congregational Prayer book.     PLAN: I will refer to [facility]   Massimo.    Ember RiveraStetson  Oncology   Pager [phone]    [facility] remains available 24/7 for emergent requests/referrals, either by having the switchboard page the on-call  or by entering an ASAP/STAT consult in Epic (this will also page the on-call ).

## 2019-05-22 NOTE — PROGRESS NOTES
Cardiology Progress Note  05/22/2019       Subjective:     Patient reports a few episodes of chest pain overnight, and Pt noted to have three isolated episodes of SVT overnight. Pt is unsure if they matched up to these runs. Pt denies chest pain this AM. HRs are stable in 60s, but HR increases to 130s-140s and desats to 80s upon sitting up or standing, but HRs improve quickly and oxygen level stabilize within a few minutes.    Remainder of 4 point ROS reviewed and negative except as above.      Objective:   Vitals: /63 (BP Location: Left arm)   Pulse 73   Temp 98.3  F (36.8  C) (Oral)   Resp 18   Wt 69.6 kg (153 lb 7 oz)   SpO2 92%   BMI 26.34 kg/m     Vitals:    05/20/19 1908 05/21/19 0729 05/22/19 0349   Weight: 68 kg (150 lb) 65.3 kg (144 lb) 69.6 kg (153 lb 7 oz)     Physical Exam:  GEN: pleasant, no acute distress  HEENT: no icterus  CV: RRR, normal s1/s2, no murmurs  CHEST: coarse breath sounds anteriorally  ABD: soft, non-tender, normal active bowel sounds  EXTR: pulses 2+. No clubbing, cyanosis or edema  NEURO: alert and oriented, speech fluent/appropriate, motor grossly nonfocal    Diagnostics:  Labs and images personally reviewed.    Echo: reviewed  Tele: reviewed  EKG: reviewed    Meds per EPIC EMR:    amLODIPine  10 mg Oral Daily     ammonium lactate   Topical BID     aspirin  81 mg Oral Daily     atorvastatin  40 mg Oral Daily     docusate sodium  100 mg Oral BID     fluticasone  1 spray Both Nostrils Daily     fluticasone-vilanterol  1 puff Inhalation Daily     furosemide  80 mg Oral BID     ipratropium - albuterol 0.5 mg/2.5 mg/3 mL  3 mL Nebulization 4x daily     levothyroxine  200 mcg Oral Daily     senna-docusate  1 tablet Oral BID    Or     senna-docusate  2 tablet Oral BID     sodium bicarbonate  650 mg Oral BID     sodium chloride (PF)  3 mL Intracatheter Q8H     sodium phosphate  15 mmol Intravenous Once     tiotropium  18 mcg Inhalation Daily     vitamin D3  2,000 Units Oral Daily        Assessment and Recommendation:   Kim Anne is a 74 yo female who was admitted on 5/21/2019. She has a history of ESRD on hemodialysis, anemia, HTN, DM, tobacco abuse, COPD, hx of tuberculosis (s/p treatment in 1970's), as well as hypoparathyroidism, hypocalcemia and hypothyroidism who presented to  ED complaining of episodic chest tightness and dyspnea x1 day.     # Chest Pain with Mildly Elevated, but Flat Troponins  # Possible SVT vs Atrial Tachycardia  Possible arrythmia-related as Pt does have hx of SVT and HD RN reports episodes of CP associated with wide-complex tachycardia runs. Hx of CAD, but no records available and Pt does not know details of this. Recently dropping Hb in last several months, now on HD with Epo and Iron. Possible unstable angina, but unclear at this time. ECG with sinus tachycardia and QTc prolongation of 522 with infrequent PVCs (1903), however after review of ECG this appears the rate was in 150s which could also represent supraventricular arrhythmia vs aflutter. No ST-T wave changes. ECG from 1945 with NSR with improved QTc and ECG at 2155 with irregular rhythm without ST-T wave changes with frequent PVCs with QTc 447. Phos WNL. Mg 1.6. K WNL. Patient with elevated troponin from 0.041-->0.048 -->0.049 --> 0.047.  - No further troponin checks needed now that peaked  - Recommend continuing low intensity hep gtt and monitoring Hb closely after transfusion, pending how Pt does with this, may consider loading with plavix with possible coronary angiogram tomorrow (already given  on 5/20, continue 81 mg daily ongoing) (hx of contrast allergy, so would need pre-treatment)  - Will make NPO after [place] in case of procedure tomorrow  - Repeat EKG with recurrence of CP and monitor on tele to see if any further arrhythmias  - Continue infectious evaluation per primary service     We will continue to follow along with you     I have seen and discussed the patient with staff  attending, Dr. Shaw, who agrees with the above.    Debra Camarillo MD  Cardiology Fellow    Staff Physician Comments:     I personally interviewed and examined Kim Anne today, and agree with cardiology residents assessment and plan as documented above.      Lynsey Shaw MD     Division of Cardiology

## 2019-05-22 NOTE — PLAN OF CARE
"Time of notification: 1350 PM  Provider notified: Darrion GUTIERREZ  Patient status: Pt was up in chair eating lunch, chest pain got really bad--\"I felt like I was gonna cry, all over my chest\". Pt stood and got back in bed, pain subsided substantially, now mainly in left back. Pt continues to have intermittent headache, requesting more Tylenol. Pt was very weak while standing and dizzy.  Temp:  [97.6  F (36.4  C)-98.5  F (36.9  C)] 98.4  F (36.9  C)  Pulse:  [73] 73  Heart Rate:  [58-79] 79  Resp:  [14-24] 20  BP: ()/(51-98) 91/56  SpO2:  [92 %-98 %] 97 %  Orders received:   "

## 2019-05-22 NOTE — TELEPHONE ENCOUNTER
Patient calling to ask about patient who is in [facility].  Connected patient to [place]  but caller ended call.    Pili Sanchez RN  [facility]

## 2019-05-22 NOTE — SIGNIFICANT EVENT
"SPIRITUAL HEALTH SERVICES  SPIRITUAL ASSESSMENT Progress Note  [facility] ([place]) 6B     REFERRAL SOURCE: On-call     Pt requested Mosque prayer book and Rosary which I brought to her. She stated: \"I want to pray but I cannot remember my prayers.\" I also prayed with her and anointed her.    PATIENT ANOINTED by Father Massimo Jean-Baptiste 5/22/2019     PLAN: Spiritual Health Services remains available for patient's ongoing support.    Massimo Jean-Baptiste M.Div.     Pager [phone]    "

## 2019-05-22 NOTE — PROGRESS NOTES
WOC Service Consult Note    WOC service consulted for suspected pressure injury to coccyx.   Patient assessed and no injury noted to sacrum or coccyx. Skin intact and without redness. Patient able to turn self independently, patient denies pain to this area.   Continue routine pressure injury prevention strategies.     WOC service will sign off at this time, please re-consult with any further questions or concerns.     Kathy Garcia RN, BSN, CWON

## 2019-05-22 NOTE — PHARMACY-ADMISSION MEDICATION HISTORY
Admission medication history interview status for the 5/20/2019 admission is complete. See Epic admission navigator for allergy information, pharmacy, prior to admission medications and immunization status.     Medication history interview sources:  Patient, Verify Rx Benefits, discharge summary on 5/5/19, and progress note on 5/8/19    Changes made to PTA medication list (reason)  Added: NA  Deleted: NA  Changed: Changed metoprolol tartrate 25 mg to metoprolol succinate 25 mg (on 5/8/19 metoprolol tartrate was historically added to the patients medication list per patient report; however, the patient's discharge summary and prescription fill history indicate that she was taking metoprolol succinate)    Additional medication history information (including reliability of information, actions taken by pharmacist):  - The patient was a poor historian. She was unable to identify medications (name of medication, dosage and directions) or time of last dose. She reported that she has someone help her with her medications, but when asked patient couldn't identify who helped her with medications.  Per progress on 5/8/19 it appears a community paramedic helps her with her medication set up  - Unsure whether patient is taking all three of her blood pressure medications.  She was told to continue amlodipine and hold her hydralazine and metoprolol succinate after her most recent discharge.  It is unclear whether she was restarted on hydralazine and metoprolol since her discharge.  - Patient fills her medications at [facility] in [place], [place]    Prior to Admission medications    Medication Sig Last Dose Taking? Auth Provider   acetaminophen (TYLENOL) 325 MG tablet Take 2 tablets (650 mg) by mouth every 4 hours as needed for mild pain Unknown at Unknown time  Gerri Zapata MD   albuterol (PROAIR HFA/PROVENTIL HFA/VENTOLIN HFA) 108 (90 Base) MCG/ACT inhaler Inhale 2 puffs into the lungs every 6  hours as needed for shortness of breath / dyspnea or wheezing Unknown at Unknown time  Mireya Baldwin APRN CNP   Alcohol Swabs (SM ALCOHOL PREP) 70 % PADS Externally apply 1 pad topically 4 times daily   Ailyn Nieves APRN CNP   amLODIPine (NORVASC) 10 MG tablet Take 1 tablet (10 mg) by mouth daily Unknown at Unknown time  Mireya Baldwin APRN CNP   ammonium lactate (LAC-HYDRIN) 12 % external lotion Apply topically 2 times daily Unknown at Unknown time  Mireya Baldwin APRN CNP   ASPIR-LOW 81 MG EC tablet Take 1 tablet (81 mg) by mouth daily Unknown at Unknown time  Mireya Baldwin APRN CNP   atorvastatin (LIPITOR) 40 MG tablet Take 1 tablet (40 mg) by mouth daily Unknown at Unknown time  Ailyn Nieves APRN CNP   blood glucose monitoring (FREESTYLE LITE) test strip TEST BLOOD SUGAR TWO TIMES A DAY   Ailyn Nieves APRN CNP   blood glucose monitoring (FREESTYLE) lancets Test BS two times daily as directed   Ailyn Nieves APRN CNP   Blood Pressure Monitoring (BLOOD PRESSURE CUFF) MISC 1 each 2 times daily   Mireya Baldwin APRN CNP   Cholecalciferol (VITAMIN D) 2000 units tablet Take 2,000 Units by mouth daily Unknown at Unknown time  Ailyn Nieves APRN CNP   docusate sodium (COLACE) 100 MG capsule Take 1 capsule (100 mg) by mouth 2 times daily Unknown at Unknown time  Mireya Baldwin APRN CNP   Emollient (EUCERIN CALMING DAILY MOIST) CREA Externally apply 1 dose. topically daily Unknown at Unknown time  Ailyn Nieves APRN CNP   fluticasone (FLONASE) 50 MCG/ACT nasal spray Spray 1 spray into both nostrils daily Unknown at Unknown time  Mireya Baldwin APRN CNP   furosemide (LASIX) 20 MG tablet Take 3 tablets (60 mg) by mouth 2 times daily Unknown at Unknown time  Gerri Zapata MD   hydrALAZINE (APRESOLINE) 25 MG tablet Take 1 tablet (25 mg) by mouth 2 times daily   Mireya Baldwin APRN CNP   hydrOXYzine (ATARAX) 10 MG  tablet Take 1 tablet (10 mg) by mouth 3 times daily as needed for itching Unknown at Unknown time  Gerri Zapata MD   levothyroxine (SYNTHROID/LEVOTHROID) 200 MCG tablet Take 1 tablet (200 mcg) by mouth daily Unknown at Unknown time  Ailyn Nieves APRN CNP   metoprolol succinate ER (TOPROL-XL) 25 MG 24 hr tablet Take 25 mg by mouth daily   Unknown, Entered By History   mometasone-formoterol (DULERA) 100-5 MCG/ACT inhaler Inhale 2 puffs into the lungs 2 times daily Unknown at Unknown time  Mireya Baldwin APRN CNP   nitroGLYcerin (NITROSTAT) 0.4 MG sublingual tablet Place 1 tablet (0.4 mg) under the tongue every 5 minutes as needed for chest pain Unknown at 1500  Wendy Espinal PA-C   nystatin (MYCOSTATIN) 595147 UNIT/GM POWD Apply 1 g topically 3 times daily as needed Unknown at Unknown time  Mai Babcock MD   order for DME Equipment being ordered: Ensure   Mireya Baldwin APRN CNP   order for DME Equipment being ordered: cane   Mireya Baldwin APRN CNP   order for DME Equipment being ordered: Diabetic Shoes   Ailyn Nieves APRN CNP   order for DME Equipment being ordered: two pairs moderate knee high support hose   Ailyn Nieves APRN CNP   order for DME Equipment being ordered: Compression socks.  Strength:15-20 mmHg   Ailyn Nieves APRN CNP   order for DME One wheeled walker with seat and brakes and basket   Mai Babcock MD   oxyCODONE IR (ROXICODONE) 10 MG tablet Take 1 tablet (10 mg) by mouth every 8 hours as needed for moderate to severe pain Unknown at Unknown time  Roxanne Maldonado APRN CNP   polyethylene glycol (MIRALAX) powder Take 17 g (1 capful) by mouth daily as needed for constipation Unknown at Unknown time  Ailyn Nieves APRN CNP   sodium bicarbonate 325 MG tablet Take 2 tablets (650 mg) by mouth 2 times daily Unknown at Unknown time  Wendy Espinal PA-C   tiotropium (SPIRIVA HANDIHALER) 18 MCG inhaled capsule  Inhale contents of one capsule daily. Unknown at Unknown time  Mireya Baldwin APRN CNP     Medication history completed by:  Batool LaurentD IV Student    I have discussed, read, edited and agree with above documentation.  Corazon Castillo, Pharm.D., [facility]  Pager [phone]

## 2019-05-22 NOTE — PROGRESS NOTES
Pt arrived to 6B from Dialysis @1800. No report was obtained from dialysis RN, Debbi, prior to arrival. Writer had returned Debbi RIVERA's call and was told by staff, RN was already transporting the patient to unit 6B. Writer then settled patient & began admission questions. Report then given to evening RN @1900.
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
## 2019-05-22 NOTE — PROGRESS NOTES
[facility], [place]    Medicine Progress Note - Hospitalist Service, Gold 7        Date of Admission:  5/20/2019  Assessment & Plan     72yo female who admitted 5/21/2019 with ESRD on hemodialysis, anemia, HTN, DM, tobacco abuse, COPD admitted with CP and SOB.         Chest pain, Dyspnea  Still having episodic CP and SOB at times exertional and other times relating to when has spontaneous SVT. Episodes typically last a few minutes and resolve with either resting or resolution of SVT.  Per chart review, patient had normal dobutamine stress ECHO in 3/2018, however at that time it was noted she had episode of SVT which was resolved with vagal maneuvers and IV metoprolol.  troponin levels 0.04 - .049.  ECHO yest with normal global and regional left ventricular function with EF 60-65%.  Global RV function normal without significant valvular abnormalities. Estimated pulm artery systolic pressure is 44mmHg c/w pulmonary hypertension. Yest during dialysis, patient had some episodes of wide complex tachycardia (6-8 beats) which resolved spontaneously.  These have not recurred since completing HD.   Concerned that this may represent unstable angina.  D/w cardiology and did not want to cath given low hgb and concern may bleed- so transfuse 1 unit(s) prbc today.  -Continue cardiac telemetry  -low intensity heparin gtt  -CBC, CMP, Mg, Phos in AM   -Continue supplemental oxygen for SpO2 >92% on RA   - mg given already once cont daily  -Nitroglycerin prn  -likely cath tomorrow, NPO after MN  -repeat EKG with recurrence of CP     Hx of COPD   Chronic cough, dyspnea:   Afebrile. Still needing supplemental oxygen.      -Continue supplemental O2 for POx >92 on RA  -Duonebs scheduled QID  -Continue PTA Spiriva  -TB rule out as below; ID consulted   -Gram stain and sputum culture   -Airborne precautions      Hx of TB (treated in 1970s) with hx of positive Quantiferon levels: Pt has a hx of TB in the  1970s which was treated for 1 year per patient. She has a hx of positive Quantiferon gold tests; 5/2017 and again on 5/5/19. She was seen by Infectious Disease in 3/2018. Infectious Disease thought positive Quantiferon gold from 2017 was likely from prior pulmonary TB. Given no hx of exposure to TB after that, ID did not think she had latent TB at that time. CXR obtained yesterday which did not show findings concerning for TB.   -ID consulted -  they felt no need to r/o TB - have discontinued isolation    T2DM: Hgb A1C 5.2 on 5/1/2019. Diet controlled.   -BG checks BID and HS   -Monitor        HTN: PTA regimen prior to dialysis initiation was Amlodipine 10mg daily, furosemide 80mg BID (which was increased during last admission). Per chart review, on 5/8,  hydralazine 25mg BID and Metoprolol 25 daily were discontinued as BP were improving with HD. BP low on admission, however were elevated during HD to 190s/80s. Had not received home medications since admission. Gave Amlodipine 10mg once as well as Hydralazine 20mg IV once. BP improved to 152/84.  Today BP /50-63 and had held her am meds.    Will hold all the BP meds again for now and cont follow.  -Goal /80s  -Monitor closely  -Vitals q4hrs    ESRD on HD 2/2 biopsy proven diabetic nephropathy  Pt w/ solitary kidney s/p donation to brother in 1998:  Dialysis was initiated on 5/2/19 due to hyperkalemia in setting of worsening renal function. She has a hx of ESRD on HD 2/2 bx proven diabetic nephropathy (2015) and she has a solitary kidney s/p donation to her brother in 1998. Dialyzes Tu, Thurs, Sat.  EDW 67kg. Is currently 65.3kg. Per Nephrology note, she has lost 10kgs and continues to have poor appetite since initiation.   -Nephrology consulted  -Will dialyze on TTS schedule per Neph  -Daily weights  -Daily BMP, Mg, Phos      Vit D deficiency: Continue cholecalciferol 2000U daily. Nephrology resuming Hectorol 2mcg with HD      Pseudohypocalcemia:  "Corrected calcium for albumin was normal with Ca level of 9.2. Recived 1g calcium gluconate in RV ED.   -Nephrology dialyzing on Hectoral 2mcg for now   -Daily BMP, monitor Ca level      Anemia: Likely secondary to ESRD  -Continue Mircera 100mg as OP [id number]   -Daily CBC   -transfuse 1UPRBC - f/u hgb pending       Hypothyroidism  Abnormal TFTs: On Levothyroxine 200mcg daily. On admission TSH 15. Per chart review, patient's TSH was WNL at 1.12 on current dose of Levothyroxine 200mcg daily. Pt states she sometimes misses her medications due to \"not being able to see them.\" Per note, she has seen providers due to needing new glasses. Likely TSH elevated in setting of non-adherence with medications. Educated to resume PTA dose.  -Resume PTA Levothyroxine 200mcg daily  -Repeat TSH with Free T4 in 4-6 weeks with PCP      Tobacco Abuse: Current every day smoker ~5 cigarettes per day. Smoked 50 years.   -Encouraged cessation      Diet: NPO per Anesthesia Guidelines for Procedure/Surgery Except for: Meds  Regular Diet Adult    DVT Prophylaxis: heparin gtt  Chaney Catheter: not present  Code Status: Full Code      Disposition Plan   Expected discharge: 2 - 3 days, recommended to prior living arrangement once CP appropriately evaluated    Entered: Tomás Porras MD 05/22/2019, 4:24 PM       The patient's care was discussed with the Bedside Nurse and Patient.    Tomás Porras MD  Hospitalist Service, [address], [place]  Pager: [phone]  Please see sticky note for cross cover information  ______________________________________________________________________    Interval History   Continues to have episodic CP and SOB either associated with exertion or when she feels her heart racing and is noted on telemetry to have SVT.    Data reviewed today: I reviewed all medications, new labs and imaging results over the last 24 hours. I personally reviewed the EKG tracing showing No ST/T " changes.    Physical Exam   Vital Signs: Temp: 98.4  F (36.9  C) Temp src: Oral BP: 91/56 Pulse: 73 Heart Rate: 79 Resp: 20 SpO2: 97 % O2 Device: Nasal cannula Oxygen Delivery: 5 LPM  Weight: 153 lbs 7.04 oz  General Appearance: Well appearing, lying in bed  Respiratory: CTAB  Cardiovascular: RRR w/ 2/6 franci  GI: +BS NT      Data   Recent Labs   Lab 05/22/19  0335 05/21/19  2108 05/21/19  1639 05/21/19  1103 05/21/19  0751 05/21/19  0159 05/20/19  1942   WBC 7.6 9.6  --  8.7  --   --  8.5   HGB 7.0* 7.8*  --  7.2*  --   --  7.9*   MCV 97 97  --  97  --   --  93    353  --  342  --   --  372   INR 0.99  --   --   --   --   --   --      --   --  143  --   --  143   POTASSIUM 3.8  --   --  4.1  --   --  3.9   CHLORIDE 106  --   --  107  --   --  106   CO2 30  --   --  31  --   --  33*   BUN 10  --   --  20  --   --  16   CR 2.24*  --   --  3.91*  --   --  3.61*   ANIONGAP 3  --   --  5  --   --  4   FRANCES 7.6*  --   --  7.2*  --   --  7.1*   *  --   --  107*  --   --  94   ALBUMIN 1.2*  --   --  1.3*  --   --  1.4*   PROTTOTAL 5.0*  --   --   --   --   --  5.8*   BILITOTAL 0.1*  --   --   --   --   --  0.3   ALKPHOS 102  --   --   --   --   --  125   ALT 10  --   --   --   --   --  12   AST 16  --   --   --   --   --  21   TROPI  --   --  0.047*  --  0.049* 0.048* 0.041

## 2019-05-22 NOTE — PROGRESS NOTES
[id number]  Pt admitted to unit after dialysis run today. Pt admitted for chest pain and SOB.   Hx of ESRD on HD TTS, HTN, DM2, COPD, TB (treated in 1970s)  Neuro: A/Ox4. Forgetful at times.   Cardiac: SR with HR 60s-80s. AVSS. Pt has had 3 runs of SVT with HR in 130s this shift. MD aware.    Respiratory: O2 sats stable on 4L NC   GI/: Pt on hemodialysis, states she does void still. No BM this shift, last BM 5/18, scheduled bowel meds given   Diet/appetite: Currently combo dialysis diet, will be NPO at midnight.   Activity:  Ax1 with a walker.   Pain: Headache moderately controlled with PRN tylenol given x1.   Skin: non blanchable redness noted on coccyx, mepilex applied. WOC consult ordered.   LDA's: CVC for hemodialysis. PIV running hep gtt at 800units/hr, Xa check at 0230.   Needs new sputum sample.     2RN skin assessment completed by writer and Riya CADENA RN.     Plan: Continue with POC. Notify primary team with changes.

## 2019-05-22 NOTE — PROGRESS NOTES
[facility] Care Coordination Contact    TRANSITIONS OF CARE (KERRIE) LOG   KERRIE tasks should be completed by the CC within one (1) business day of notification of each transition. Follow up contact with member is required after return to their usual care setting.  Note:  If CC finds out about the transitions fifteen (15) days or more after the member has returned to their usual care setting, no KERRIE log is needed. However, the CC should check in with the member to discuss the transition process, any changes needed to the care plan and document it in a case note.    Member Name:  [facility] [id number]   [place] [facility] [place] Coordinator Contact:  Batool Mai RN, PHN Agency/County/Care System: [facility]   Transition Communication Actions from Care Management Contact   Transition #1   Notification Date: 5/22/19 Transition Date:   5/21/19 Transition From: Dialysis     Is this the member s usual care setting?               yes Transition To: [facility]   Transition Type:  Unplanned  Reason for Admission/Comments:    Atypical chest pain.   5/21/19CC received notification of Emergency Room visit.  ER visit occurred on 5/20/19 at [facility] with Dx of Chest pain. Member was then transferred to [place] on 5/21 to complete dialysis treatment.   CC contacted [place] ED to determine if member will be admitted or discharged home. ED SW was unsure of member's plan after dialysis she will call and update once a plan is established. Contact information given to ED SW for this CC and also ongoing CC.  5/30 call from unit VELASQUEZ sun, who states member will discharge to [facility] today at 330pm.    5/30- call to [facility] VELASQUEZ alston states last minute member is declining TCU.  WIll discharge to home today with home care.            Shared CC contact info, care plan/services with receiving  setting--Date completed: 5/22/19   Notified PCP of transition--Date completed:  5/22/19     via  EMR   Transition #2   Transition #3  (if applicable)   Notification Date:      5/30/19    Transition To: Home   Transition Date:      Transition Type:    Planned  Notified PCP -- Date completed: 5/30/19              Shared CC contact info, care plan/services with receiving setting or, if applicable, home care agency--Date completed:  Left voicemail with Enoch zarate- sharing contact information and requesting invite to CC.   *Complete additional tasks below, if this transition is a return to usual care setting.      Comments:  Call to [facility] to speak with RN. Left voicemail requesting call back.   Call to member, man who states is pts son answers.  States member is still sleeping.  Takes message to request that pt call CM. Does not have follow up scheduled.  CM requested son to assist.  Son agrees.        Comments:      *Complete tasks below when the member is discharging TO their usual care setting within one (1) business day of notification.  For situations where the Care Coordinator is notified of the discharge prior to the date of discharge, the Care Coordinator must follow up with the member or designated representative to confirm that discharge actually occurred and discuss required KERRIE tasks as outlined in the KERRIE Instructions.  (This includes situations where it may be a  new  usual care setting for the member. (i.e., a community member who decides upon permanent nursing home placement following hospitalization and rehab).    Date completed: 6/3/19  Communicated with member or their designated representative about the following:  care transition process; about changes to the member s health status; plan of care updates; education about transitions and how to prevent unplanned transitions/readmissions  Four Pillars for Optimal Transition:    Check  Yes  - if the member, family member and/or  SNF/facility staff manages the following:    If  No  provide explanation in the comments section.          []  Yes     []  No     Does the member have a follow-up appointment scheduled with primary care or specialist? (Mental health hospitalizations--the appt. should be w/in 7 days)   [x]  Yes     []  No     Can the member manage their medications or is there a system in place to manage medications (e.g. home care set-up)?         [x]  Yes     []  No     Can the member verbalize warning signs and symptoms to watch for and how to respond?         [x]  Yes     []  No     Does the member use a Personal Health Care Record?  Check  Yes  if visit summary, discharge summary, and/or healthcare summary are being used as a PHR.                                                                                                                                                                                    [x] Yes      [] No      Have you updated the member s care plan?  If  No  provide explanation in comments.   Comments:  [facility]- new service.      Batool Mai RN, BSN, PHN  [facility]  [phone]  Fax: [phone]

## 2019-05-22 NOTE — PLAN OF CARE
Neuro: A&Ox4, forgetful.  Cardiac: SR, intermittently tachycardic with activity and and rest. Had 3 5-beat runs of SVT, team aware. Pt had really bad chest pain while sitting in chair eating lunch, eased by returning to bed. MD aware, EKG if sustained chest pain. VSS. /58   Pulse 66   Temp 98.5  F (36.9  C) (Oral)   Resp 20   Wt 69.6 kg (153 lb 7 oz)   SpO2 98%   BMI 26.34 kg/m     Respiratory: Sating 98% on 5LNC, pt is very dyspneic with movement. Pt has productive cough with thick yellow/gray sputum. AFB sample sent. Airborne precautions discontinued per MD.  GI/: Adequate urine output. Pt constipated, scheduled stool softener.  Diet/appetite: Tolerating dialysis diet. Eating fair amount. Pt is NPO at midnight for angiogram tomorrow.  Activity:  Assist of 1, poor activity tolerance. Up to chair.  Pain: At acceptable level on current regimen, pt has intermittent headache--Tylenol given. Pt had chest pain at lunch, eased after returning to bed.   Skin: No new deficits noted, WOC assessed coccyx--no breakdown. Preventative mepilex.  LDA's: 2 PIVs, heparin drip & blood infusing. Tunneled dialysis catheter SL.    Plan: Continue with POC. Notify primary team with changes. NPO at midnight for angiogram. Continue heparin drip and monitor telemetry/chest pain.

## 2019-05-22 NOTE — PROVIDER NOTIFICATION
Gold 7 team paged that lab called and stated that due to the fact that pt hasnt had sputum for continous AFB, they cancelled it and it would need to be reordered if wanted. The sputum they sent yesterday am was quantity not sufficient.

## 2019-05-23 ENCOUNTER — APPOINTMENT (OUTPATIENT)
Dept: GENERAL RADIOLOGY | Facility: CLINIC | Age: 74
DRG: 286 | End: 2019-05-23
Attending: INTERNAL MEDICINE
Payer: COMMERCIAL

## 2019-05-23 LAB
ALBUMIN SERPL-MCNC: 1.2 G/DL (ref 3.4–5)
ALP SERPL-CCNC: 100 U/L (ref 40–150)
ALT SERPL W P-5'-P-CCNC: 10 U/L (ref 0–50)
ANION GAP SERPL CALCULATED.3IONS-SCNC: 4 MMOL/L (ref 3–14)
AST SERPL W P-5'-P-CCNC: 18 U/L (ref 0–45)
BILIRUB SERPL-MCNC: 0.4 MG/DL (ref 0.2–1.3)
BUN SERPL-MCNC: 17 MG/DL (ref 7–30)
CALCIUM SERPL-MCNC: 7.1 MG/DL (ref 8.5–10.1)
CHLORIDE SERPL-SCNC: 108 MMOL/L (ref 94–109)
CO2 SERPL-SCNC: 28 MMOL/L (ref 20–32)
CREAT SERPL-MCNC: 2.97 MG/DL (ref 0.52–1.04)
ERYTHROCYTE [DISTWIDTH] IN BLOOD BY AUTOMATED COUNT: 15.2 % (ref 10–15)
GFR SERPL CREATININE-BSD FRML MDRD: 15 ML/MIN/{1.73_M2}
GLUCOSE BLDC GLUCOMTR-MCNC: 134 MG/DL (ref 70–99)
GLUCOSE BLDC GLUCOMTR-MCNC: 74 MG/DL (ref 70–99)
GLUCOSE SERPL-MCNC: 76 MG/DL (ref 70–99)
HCT VFR BLD AUTO: 26.8 % (ref 35–47)
HGB BLD-MCNC: 8 G/DL (ref 11.7–15.7)
KCT BLD-ACNC: 146 SEC (ref 75–150)
LMWH PPP CHRO-ACNC: 0.22 IU/ML
MCH RBC QN AUTO: 28.2 PG (ref 26.5–33)
MCHC RBC AUTO-ENTMCNC: 29.9 G/DL (ref 31.5–36.5)
MCV RBC AUTO: 94 FL (ref 78–100)
PHOSPHATE SERPL-MCNC: 3.2 MG/DL (ref 2.5–4.5)
PLATELET # BLD AUTO: 320 10E9/L (ref 150–450)
POTASSIUM SERPL-SCNC: 3.8 MMOL/L (ref 3.4–5.3)
PROT SERPL-MCNC: 4.8 G/DL (ref 6.8–8.8)
RBC # BLD AUTO: 2.84 10E12/L (ref 3.8–5.2)
SODIUM SERPL-SCNC: 140 MMOL/L (ref 133–144)
WBC # BLD AUTO: 8.2 10E9/L (ref 4–11)

## 2019-05-23 PROCEDURE — 63400005 ZZH RX 634: Performed by: INTERNAL MEDICINE

## 2019-05-23 PROCEDURE — 93454 CORONARY ARTERY ANGIO S&I: CPT | Mod: 26 | Performed by: INTERNAL MEDICINE

## 2019-05-23 PROCEDURE — 80053 COMPREHEN METABOLIC PANEL: CPT | Performed by: INTERNAL MEDICINE

## 2019-05-23 PROCEDURE — 99207 ZZC APP CREDIT; MD BILLING SHARED VISIT: CPT | Performed by: PHYSICIAN ASSISTANT

## 2019-05-23 PROCEDURE — 12000004 ZZH R&B IMCU UMMC

## 2019-05-23 PROCEDURE — 85027 COMPLETE CBC AUTOMATED: CPT | Performed by: INTERNAL MEDICINE

## 2019-05-23 PROCEDURE — 71046 X-RAY EXAM CHEST 2 VIEWS: CPT

## 2019-05-23 PROCEDURE — 94640 AIRWAY INHALATION TREATMENT: CPT

## 2019-05-23 PROCEDURE — 99232 SBSQ HOSP IP/OBS MODERATE 35: CPT | Mod: 25 | Performed by: INTERNAL MEDICINE

## 2019-05-23 PROCEDURE — 25000128 H RX IP 250 OP 636: Performed by: INTERNAL MEDICINE

## 2019-05-23 PROCEDURE — B2111ZZ FLUOROSCOPY OF MULTIPLE CORONARY ARTERIES USING LOW OSMOLAR CONTRAST: ICD-10-PCS | Performed by: INTERNAL MEDICINE

## 2019-05-23 PROCEDURE — 36415 COLL VENOUS BLD VENIPUNCTURE: CPT | Performed by: INTERNAL MEDICINE

## 2019-05-23 PROCEDURE — 25000132 ZZH RX MED GY IP 250 OP 250 PS 637: Performed by: PHYSICIAN ASSISTANT

## 2019-05-23 PROCEDURE — 90937 HEMODIALYSIS REPEATED EVAL: CPT

## 2019-05-23 PROCEDURE — 99152 MOD SED SAME PHYS/QHP 5/>YRS: CPT | Performed by: INTERNAL MEDICINE

## 2019-05-23 PROCEDURE — 94640 AIRWAY INHALATION TREATMENT: CPT | Mod: 76

## 2019-05-23 PROCEDURE — 25000125 ZZHC RX 250: Performed by: PHYSICIAN ASSISTANT

## 2019-05-23 PROCEDURE — 00000146 ZZHCL STATISTIC GLUCOSE BY METER IP

## 2019-05-23 PROCEDURE — 25000128 H RX IP 250 OP 636: Performed by: STUDENT IN AN ORGANIZED HEALTH CARE EDUCATION/TRAINING PROGRAM

## 2019-05-23 PROCEDURE — 5A1D70Z PERFORMANCE OF URINARY FILTRATION, INTERMITTENT, LESS THAN 6 HOURS PER DAY: ICD-10-PCS | Performed by: INTERNAL MEDICINE

## 2019-05-23 PROCEDURE — 84100 ASSAY OF PHOSPHORUS: CPT | Performed by: INTERNAL MEDICINE

## 2019-05-23 PROCEDURE — 93454 CORONARY ARTERY ANGIO S&I: CPT | Performed by: INTERNAL MEDICINE

## 2019-05-23 PROCEDURE — 27210794 ZZH OR GENERAL SUPPLY STERILE: Performed by: INTERNAL MEDICINE

## 2019-05-23 PROCEDURE — 40000275 ZZH STATISTIC RCP TIME EA 10 MIN

## 2019-05-23 PROCEDURE — C1894 INTRO/SHEATH, NON-LASER: HCPCS | Performed by: INTERNAL MEDICINE

## 2019-05-23 PROCEDURE — 99233 SBSQ HOSP IP/OBS HIGH 50: CPT | Performed by: INTERNAL MEDICINE

## 2019-05-23 PROCEDURE — 85347 COAGULATION TIME ACTIVATED: CPT

## 2019-05-23 PROCEDURE — 25000125 ZZHC RX 250: Performed by: INTERNAL MEDICINE

## 2019-05-23 PROCEDURE — 85520 HEPARIN ASSAY: CPT | Performed by: INTERNAL MEDICINE

## 2019-05-23 RX ORDER — ATROPINE SULFATE 0.1 MG/ML
0.5 INJECTION INTRAVENOUS EVERY 5 MIN PRN
Status: ACTIVE | OUTPATIENT
Start: 2019-05-23 | End: 2019-05-24

## 2019-05-23 RX ORDER — NALOXONE HYDROCHLORIDE 0.4 MG/ML
.2-.4 INJECTION, SOLUTION INTRAMUSCULAR; INTRAVENOUS; SUBCUTANEOUS
Status: DISCONTINUED | OUTPATIENT
Start: 2019-05-23 | End: 2019-05-23

## 2019-05-23 RX ORDER — FENTANYL CITRATE 50 UG/ML
25-50 INJECTION, SOLUTION INTRAMUSCULAR; INTRAVENOUS
Status: ACTIVE | OUTPATIENT
Start: 2019-05-23 | End: 2019-05-24

## 2019-05-23 RX ORDER — FLUMAZENIL 0.1 MG/ML
0.2 INJECTION, SOLUTION INTRAVENOUS
Status: ACTIVE | OUTPATIENT
Start: 2019-05-23 | End: 2019-05-24

## 2019-05-23 RX ORDER — DIPHENHYDRAMINE HYDROCHLORIDE 50 MG/ML
INJECTION INTRAMUSCULAR; INTRAVENOUS
Status: DISCONTINUED | OUTPATIENT
Start: 2019-05-23 | End: 2019-05-23 | Stop reason: HOSPADM

## 2019-05-23 RX ORDER — NALOXONE HYDROCHLORIDE 0.4 MG/ML
.1-.4 INJECTION, SOLUTION INTRAMUSCULAR; INTRAVENOUS; SUBCUTANEOUS
Status: DISCONTINUED | OUTPATIENT
Start: 2019-05-23 | End: 2019-05-23

## 2019-05-23 RX ORDER — ARGATROBAN 1 MG/ML
350 INJECTION, SOLUTION INTRAVENOUS
Status: DISCONTINUED | OUTPATIENT
Start: 2019-05-23 | End: 2019-05-23 | Stop reason: HOSPADM

## 2019-05-23 RX ORDER — NITROGLYCERIN 20 MG/100ML
.07-2 INJECTION INTRAVENOUS CONTINUOUS PRN
Status: DISCONTINUED | OUTPATIENT
Start: 2019-05-23 | End: 2019-05-23 | Stop reason: HOSPADM

## 2019-05-23 RX ORDER — EPTIFIBATIDE 2 MG/ML
180 INJECTION, SOLUTION INTRAVENOUS EVERY 10 MIN PRN
Status: DISCONTINUED | OUTPATIENT
Start: 2019-05-23 | End: 2019-05-23 | Stop reason: HOSPADM

## 2019-05-23 RX ORDER — NITROGLYCERIN 5 MG/ML
VIAL (ML) INTRAVENOUS
Status: DISCONTINUED | OUTPATIENT
Start: 2019-05-23 | End: 2019-05-23 | Stop reason: HOSPADM

## 2019-05-23 RX ORDER — FENTANYL CITRATE 50 UG/ML
50 INJECTION, SOLUTION INTRAMUSCULAR; INTRAVENOUS
Status: ACTIVE | OUTPATIENT
Start: 2019-05-23 | End: 2019-05-24

## 2019-05-23 RX ORDER — CLOPIDOGREL BISULFATE 75 MG/1
600 TABLET ORAL ONCE
Status: DISCONTINUED | OUTPATIENT
Start: 2019-05-23 | End: 2019-05-24 | Stop reason: CLARIF

## 2019-05-23 RX ORDER — DOPAMINE HYDROCHLORIDE 160 MG/100ML
2-20 INJECTION, SOLUTION INTRAVENOUS CONTINUOUS PRN
Status: DISCONTINUED | OUTPATIENT
Start: 2019-05-23 | End: 2019-05-23 | Stop reason: HOSPADM

## 2019-05-23 RX ORDER — METHYLPREDNISOLONE SODIUM SUCCINATE 125 MG/2ML
125 INJECTION, POWDER, LYOPHILIZED, FOR SOLUTION INTRAMUSCULAR; INTRAVENOUS ONCE
Status: COMPLETED | OUTPATIENT
Start: 2019-05-23 | End: 2019-05-23

## 2019-05-23 RX ORDER — FENTANYL CITRATE 50 UG/ML
INJECTION, SOLUTION INTRAMUSCULAR; INTRAVENOUS
Status: DISCONTINUED | OUTPATIENT
Start: 2019-05-23 | End: 2019-05-23 | Stop reason: HOSPADM

## 2019-05-23 RX ORDER — DOBUTAMINE HYDROCHLORIDE 200 MG/100ML
2-20 INJECTION INTRAVENOUS CONTINUOUS PRN
Status: DISCONTINUED | OUTPATIENT
Start: 2019-05-23 | End: 2019-05-23 | Stop reason: HOSPADM

## 2019-05-23 RX ORDER — FENTANYL CITRATE 50 UG/ML
25 INJECTION, SOLUTION INTRAMUSCULAR; INTRAVENOUS EVERY 5 MIN PRN
Status: ACTIVE | OUTPATIENT
Start: 2019-05-23 | End: 2019-05-24

## 2019-05-23 RX ORDER — DIPHENHYDRAMINE HYDROCHLORIDE 50 MG/ML
50 INJECTION INTRAMUSCULAR; INTRAVENOUS ONCE
Status: DISCONTINUED | OUTPATIENT
Start: 2019-05-23 | End: 2019-05-23 | Stop reason: HOSPADM

## 2019-05-23 RX ORDER — LIDOCAINE 40 MG/G
CREAM TOPICAL
Status: DISCONTINUED | OUTPATIENT
Start: 2019-05-23 | End: 2019-05-30 | Stop reason: HOSPADM

## 2019-05-23 RX ORDER — DOXERCALCIFEROL 4 UG/2ML
2 INJECTION INTRAVENOUS
Status: COMPLETED | OUTPATIENT
Start: 2019-05-23 | End: 2019-05-23

## 2019-05-23 RX ORDER — EPTIFIBATIDE 2 MG/ML
1 INJECTION, SOLUTION INTRAVENOUS CONTINUOUS PRN
Status: DISCONTINUED | OUTPATIENT
Start: 2019-05-23 | End: 2019-05-23 | Stop reason: HOSPADM

## 2019-05-23 RX ORDER — METHYLPREDNISOLONE SODIUM SUCCINATE 40 MG/ML
40 INJECTION, POWDER, LYOPHILIZED, FOR SOLUTION INTRAMUSCULAR; INTRAVENOUS EVERY 4 HOURS
Status: DISCONTINUED | OUTPATIENT
Start: 2019-05-23 | End: 2019-05-23

## 2019-05-23 RX ORDER — NOREPINEPHRINE BITARTRATE 0.06 MG/ML
.03-.4 INJECTION, SOLUTION INTRAVENOUS CONTINUOUS PRN
Status: DISCONTINUED | OUTPATIENT
Start: 2019-05-23 | End: 2019-05-23 | Stop reason: HOSPADM

## 2019-05-23 RX ORDER — ARGATROBAN 1 MG/ML
150 INJECTION, SOLUTION INTRAVENOUS
Status: DISCONTINUED | OUTPATIENT
Start: 2019-05-23 | End: 2019-05-23 | Stop reason: HOSPADM

## 2019-05-23 RX ADMIN — DOXERCALCIFEROL 2 MCG: 4 INJECTION, SOLUTION INTRAVENOUS at 10:43

## 2019-05-23 RX ADMIN — METHYLPREDNISOLONE SODIUM SUCCINATE 125 MG: 125 INJECTION, POWDER, FOR SOLUTION INTRAMUSCULAR; INTRAVENOUS at 12:55

## 2019-05-23 RX ADMIN — EPOETIN ALFA 4000 UNITS: 10000 SOLUTION INTRAVENOUS; SUBCUTANEOUS at 10:42

## 2019-05-23 RX ADMIN — SODIUM BICARBONATE 650 MG TABLET 650 MG: at 09:04

## 2019-05-23 RX ADMIN — FLUTICASONE PROPIONATE 1 SPRAY: 50 SPRAY, METERED NASAL at 09:03

## 2019-05-23 RX ADMIN — SODIUM BICARBONATE 650 MG TABLET 650 MG: at 19:44

## 2019-05-23 RX ADMIN — SENNOSIDES AND DOCUSATE SODIUM 2 TABLET: 8.6; 5 TABLET ORAL at 09:04

## 2019-05-23 RX ADMIN — IPRATROPIUM BROMIDE AND ALBUTEROL SULFATE 3 ML: .5; 3 SOLUTION RESPIRATORY (INHALATION) at 07:42

## 2019-05-23 RX ADMIN — DOCUSATE SODIUM 100 MG: 100 CAPSULE, LIQUID FILLED ORAL at 09:05

## 2019-05-23 RX ADMIN — MELATONIN 2000 UNITS: at 09:04

## 2019-05-23 RX ADMIN — IPRATROPIUM BROMIDE AND ALBUTEROL SULFATE 3 ML: .5; 3 SOLUTION RESPIRATORY (INHALATION) at 20:45

## 2019-05-23 RX ADMIN — TIOTROPIUM BROMIDE 18 MCG: 18 CAPSULE ORAL; RESPIRATORY (INHALATION) at 09:02

## 2019-05-23 RX ADMIN — IPRATROPIUM BROMIDE AND ALBUTEROL SULFATE 3 ML: .5; 3 SOLUTION RESPIRATORY (INHALATION) at 15:54

## 2019-05-23 RX ADMIN — LEVOTHYROXINE SODIUM 200 MCG: 100 TABLET ORAL at 09:05

## 2019-05-23 RX ADMIN — FLUTICASONE FUROATE AND VILANTEROL TRIFENATATE 1 PUFF: 100; 25 POWDER RESPIRATORY (INHALATION) at 09:01

## 2019-05-23 RX ADMIN — ATORVASTATIN CALCIUM 40 MG: 40 TABLET, FILM COATED ORAL at 09:04

## 2019-05-23 RX ADMIN — Medication: at 19:48

## 2019-05-23 RX ADMIN — ASPIRIN 81 MG: 81 TABLET, COATED ORAL at 09:05

## 2019-05-23 RX ADMIN — Medication: at 09:01

## 2019-05-23 RX ADMIN — SODIUM CHLORIDE 250 ML: 9 INJECTION, SOLUTION INTRAVENOUS at 10:42

## 2019-05-23 RX ADMIN — SODIUM CHLORIDE 300 ML: 9 INJECTION, SOLUTION INTRAVENOUS at 10:42

## 2019-05-23 ASSESSMENT — ACTIVITIES OF DAILY LIVING (ADL)
ADLS_ACUITY_SCORE: 18

## 2019-05-23 NOTE — PROGRESS NOTES
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  Nephrology consulted for management of her ESKD.     ESKD on IHD initiated IHD 5/2/19  Creatinine was 4.7 with GFR ~8 with >10g/g of albuminuria  Bx 2015 with DM nephropathy  Kidney function had been slowly declining  HD initiated 5/2/19   She is dialyzing at [facility] under care of Dr Liz on TTS schedule  Treatment time 3.5hrs , last Kt/V 1.05, EDW 67Kg--> she is now around 64kg with weight loss and poor appetite  -- will dialyze based on her TTS schedule  -- since initiation she has lost >10 kgs and continues to have poor appetite   -- will monitor closely  -- please get daily standing weights      Electrolytes are stable  Mild hypocalcemia -- corrected wnl (improved since dialysis initiation)  -- will dialyze on 2mcg Hectorol for now.     Euvolemic and hx of HTN  Prior to dialysis initiation was on amlodipine 10 , furosemide 40mg bid , hydralazine 25mg BiD and metoprolol 25 daily  -- will continue on lasix to 80mg BiD and amlodipine 10mg   -- goal /80s     Anemia   ACD/inflamm  Iron stores are low Isat 18% with Ferritin 265  -- EPO dosed at 4000U with Venofer 100mg Qhd  -- She is on Mircera 100mg as OP Q2wk     MBD    Hypocalcemia improved from before and phos low at 2.7 last checked  Vit D 15  -- Continue on PO 2000U D3  -- Will resume on Hectorol 2mcg with IHD     DM 2 on Insulin     Chest Pain with mild troponin leak  SVT over night , plans for cath today  -- ECHO today with concentric LVH and 55-60% EF and pulm HTN  -- She has been eating poorly and has lost 5kgs since last admission , the run sheets do not indicate  any intra-dialytic hypotension   -- Cardiology following      Recommendations were communicated to primary team     Seen and discussed with Dr. Nadine Sheffield MD   [phone]    Interval History :   Nursing and provider notes from last 24 hours reviewed.  In the last 24 hours Kim Anne has had SVTs over night , does not report any new issues today except fatigue and poor appetite    Review of Systems:   I reviewed the following systems:  GI: + appetite. - nausea or vomiting or diarrhea.   Neuro:  - confusion  Constitutional:  - fever or chills  CV: - dyspnea or edema.  - chest pain.    Physical Exam:   I/O last 3 completed shifts:  In: 873.63 [P.O.:240; I.V.:286.13]  Out: 575 [Urine:575]   /73   Pulse 61   Temp 98.6  F (37  C) (Oral)   Resp 18   Wt 64 kg (141 lb 1.5 oz)   SpO2 100%   BMI 24.22 kg/m       GENERAL APPEARANCE: NAD  EYES:  - scleral icterus, pupils equal  HENT: mouth without ulcers or lesions  PULM: lungs clear to auscultation bilaterally, equal air movement, no clubbing  CV: regular rhythm, normal rate, no rub     -JVD -     -edema -   GI: soft, non tender, non distended, bowel sounds are +  INTEGUMENT: no cyanosis, - rash  NEURO:  - asterixis   Access RIJ access CVC tunneled     Labs:   All labs reviewed by me  Electrolytes/Renal -   Recent Labs   Lab Test 05/23/19  0445 05/22/19  0335 05/21/19  1639 05/21/19  1103  05/02/19  0645    139  --  143   < > 145*   POTASSIUM 3.8 3.8  --  4.1   < > 5.9*   CHLORIDE 108 106  --  107   < > 123*   CO2 28 30  --  31   < > 14*   BUN 17 10  --  20   < > 43*   CR 2.97* 2.24*  --  3.91*   < > 4.77*   GLC 76 109*  --  107*   < > 87   FRANCES 7.1* 7.6*  --  7.2*   < > 7.0*   MAG  --  1.6 1.6  --   --  2.0   PHOS 3.2 1.9*  --  3.3  --   --     < > = values in this interval not displayed.       CBC -   Recent Labs   Lab Test 05/23/19  0445 05/22/19  0335 05/21/19  2108   WBC 8.2 7.6 9.6   HGB 8.0* 7.0* 7.8*    327 353       LFTs -    Recent Labs   Lab Test 05/23/19  0445 05/22/19  0335 05/21/19  1103 05/20/19  1942   ALKPHOS 100 102  --  125   BILITOTAL 0.4 0.1*  --  0.3   ALT 10 10  --  12   AST 18 16  --  21   PROTTOTAL 4.8* 5.0*  --  5.8*   ALBUMIN 1.2* 1.2* 1.3* 1.4*       Iron Panel -   Recent Labs   Lab Test 05/01/19  1320 02/16/18  0945 09/11/17  0928   IRON 48 46 73   IRONSAT 34 28 43   * 134 127         Imaging:  All imaging studies reviewed by me.     Current Medications:    ammonium lactate   Topical BID     aspirin  81 mg Oral Daily     atorvastatin  40 mg Oral Daily     cangrelor  30 mcg/kg Intravenous Once     clopidogrel  600 mg Oral Once     diphenhydrAMINE  50 mg Intravenous Once     docusate sodium  100 mg Oral BID     fentaNYL  50 mcg Intravenous Once within 24 hrs     fluticasone  1 spray Both Nostrils Daily     fluticasone-vilanterol  1 puff Inhalation Daily     gelatin absorbable  1 each Topical During Hemodialysis (from stock)     ipratropium - albuterol 0.5 mg/2.5 mg/3 mL  3 mL Nebulization 4x daily     levothyroxine  200 mcg Oral Daily     midazolam  1 mg Intravenous Once within 24 hrs     - MEDICATION INSTRUCTIONS -   Does not apply Once     senna-docusate  1 tablet Oral BID    Or     senna-docusate  2 tablet Oral BID     sodium bicarbonate  650 mg Oral BID     sodium chloride (PF)  3 mL Intracatheter Q8H     sodium chloride (PF)  9 mL Intracatheter During Hemodialysis (from stock)     sodium chloride (PF)  9 mL Intracatheter During Hemodialysis (from stock)     tiotropium  18 mcg Inhalation Daily     vitamin D3  2,000 Units Oral Daily       argatroban       bivalirudin (ANGIOMAX) infusion ADULT       cangrelor (KENGREAL) infusion ADULT       DOBUTamine       DOPamine       EPINEPHrine IV infusion ADULT       eptifibatide       HEParin 950 Units/hr (05/22/19 2045)     HEParin       - MEDICATION INSTRUCTIONS -       nitroGLYcerin       nitroPRUsside (NIPRIDE) IV infusion ADULT/PEDS GREATER than or EQUAL to 45 kg  std conc       norepinephrine       phenylephrine       vasopressin (PITRESSIN) infusion ADULT (40 mL)       Yola Sheffield MD

## 2019-05-23 NOTE — PLAN OF CARE
NEURO: Drowsy after angio today (given benadryl, fentanyl and versed). Oriented x4.   RESPIRATORY: LS diminished, R>L. SpO2> 90% on 2 LPM, weaned down from 3 LPM after procedure.   CARDIO: HR sinus rhythm. Other VSS. Groin site WNL- bilateral foot pulses weak. Doppler ordered and used to verify pulse in RLE. Pt denies numbness, ROM WNL.   GI/: BS active. Renal diet. Awaiting dinner.   ACTIVITY: Assist 1 stand at side of bed.   PAIN: Denied.   PLAN: Continue to monitor groin site and overall condition. Notify MD with concerns.

## 2019-05-23 NOTE — PROGRESS NOTES
HEMODIALYSIS TREATMENT NOTE    Date: 5/23/2019  Time: 1:54 PM    Data:  Pre Wt: 64 kg    Desired Wt: 63 kg   Post Wt: 63.2 kg   Weight gain: 0 kg   Weight change: -0.8 kg  Ultrafiltration - Post Run Net Total Removed (mL): 800 mL  Ultrafiltration - Post Run Net Total Gain (mL): 0 mL  Vascular Access Status: Dressing CDI.  CVC lumens saline locked & new Clear Guard caps applied post-run.   Dialyzer Rinse: Light  Total Blood Volume Processed: 69.6  Total Dialysis (Treatment) Time: 3 hours     Lab:   No    Assessment:  Arrived to unit afebrile, VS WDL, oriented x4 and calm/cooperative.  No edema noted & lung sounds clear to auscultation.  Productive cough noted.  NPO for cardiac cath procedure later in day. BS on arrival 74, BS at end of run 134.  Patient able to stand for weight with x1 assist.       Interventions:  Patient dialyzed for 3 hours on K3 Ca3 dialysate bath via RIJ CVC.  UF tolerated well & net removal 0.8kg.  VSS throughout run.  Assisted to toilet x1 via bedpan with large formed stool noted.  Administered Solu-Medrol 125mg IV with rinseback for contrast prep.       Plan:    Per renal team.  Handoff report given by phone to NICOLE Ryan.

## 2019-05-23 NOTE — PROGRESS NOTES
[facility] [place]    Medicine Progress Note - Hospitalist Service, Gold 7       Date of Admission:  5/20/2019  Assessment & Plan Kim Anne is a 73 year old female admitted on 5/20/2019. 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 She is admitted for evaluation of exertional chest pain and dyspnea.     # Exertional chest pain and dyspnea:  Initial concern for unstable angina given slightly elevated, but stable, troponin. Echo 5/21 showed normal LV function (EF 60-65%), no wall motion abnormalities, normal RV function, and elevated PA pressures c/w pulmonary hypertension. Symptoms quickly resolve with rest. Empirically treated with ASA and low intensity heparin drip. Occasionally symptomatic at rest with runs of SVT/NSVT, but otherwise exertional and highly concerning for ACS. Cardiology consulted, s/p coronary angio 5/23 which showed only mild CAD (LAD 30% stenosis, RCA 30-60% stenosis). No intervention. Patient noted to have SVT during procedure with associated hypotension. Cardiology suspects symptomatic SVT.   - Continue ASA 81mg daily per cards recs  - EP consult as below  - Consider repeat EKG for recurrent symptoms    # Symptomatic paroxysmal SVT and non-sustained VT:  Intermittently noted on telemetry, occasionally noted with symptoms of chest pain and dyspnea. Historically hypertensive, but has been borderline hypotensive since admission. Noted to have frequent runs of SVT during heart cath with associated hypotension. Suspect presenting symptoms d/t arrhythmia.   - Continue telemetry  - EP consulted for recommendations    # Acute hypoxic respiratory failure:  Suspect poor pulmonary reserve in setting of COPD, with acute desaturations due to SVT. O2 sats currently stable on 2 L. Lungs clear. CXR clear on  admission. Sputum culture 5/21 grew Hemophilus influenzae and Moraxella catarrhalis, but no acute pulmonary symptoms, fever, or leukocytosis to suggest active infection.   - Wean O2 as able  - Will consider treatment w antibiotics if having increased sputum production, cough or fevers.     # COPD:  No wheezing on exam. Cont breo ellipta, spiriva, and duoneb QID.     # Hx LTBI with positive quantiferon gold:  Reportedly treated in 1970's. No history of new exposures. CXR negative for evidence of active TB. ID consulted. No indication for further evaluation or airborne isolation d/t positive quantiferon gold alone. Single sputum sent for AFB stain/culture, currently pending.   - Airborne isolation discontinued  - Follow up on pending AFB cultures     # Type II DM:  Diet controlled. A1C 5.2 on 5/1/19. Adequately controlled. Borderline hypoglycemia d/t NPO status. Now improved.   - Glucose checks AC/HS  - Hypoglycemia protocol ordered     # HTN:  Previous regimen included hydralazine 25mg BID, metoprolol XL 25mg daily, amlodipine 10mg daily, and lasix 80mg BID. Hydralazine and metoprolol recently discontinued d/t lower BP with HD. Now hypotensive, likely related to SVT.   - Amlodipine and Lasix discontinued  - Monitor    # ESRD on HD; Hx solitary kidney:  Secondary to diabetic nephropathy. HD initiated 5/2/19, currently TTS via RIJ tunneled catheter.   - Nephrology consulted  - Continuing TTS HD schedule  - Daily weights  - Daily BMP, Mg, Phos    # Chronic anemia:  Likely anemia of chronic renal disease. Hgb stable.   - Repeat CBC in AM    # Hypothyroidism:  TSH 15 on admission, likely d/t medication non-compliance. TSH 1.12 on current dose of levothyroxine when taking regularly.    - Continue PTA levothyroxine 200mcg daily  - Repeat TFT's in 4-6 weeks    # Tobacco abuse:  Encouraged cessation.        Diet: NPO per Anesthesia Guidelines for Procedure/Surgery Except for: Meds    DVT Prophylaxis: IV heparin  Chaney  Catheter: not present  Code Status: Full Code      Disposition Plan   Expected discharge: 2 - 3 days, recommended to prior living arrangement once diagnostics completed and symptoms improved.  Entered: Emery Hampton PA-C 05/23/2019, 12:00 PM       The patient's care was discussed with the Attending Physician, Dr. Porras.    Emery Hampton PA-C  Hospitalist Service, [address], [place]  Pager: [phone]  Please see sticky note for cross cover information  ______________________________________________________________________    Interval History   Feeling about the same today. Notes ongoing chest pressure and dyspnea on exertion. Also noted intermittent palpitations. No dizziness. No fevers or chills. Cough and sputum production at baseline. No GI or  complaints.     Data reviewed today: I reviewed all medications, new labs and imaging results over the last 24 hours. I personally reviewed no images or EKG's today.    Physical Exam   Vital Signs: Temp: 98.6  F (37  C) Temp src: Oral BP: 128/71 Pulse: 61 Heart Rate: 61 Resp: 20 SpO2: 100 % O2 Device: Nasal cannula Oxygen Delivery: 2 LPM  Weight: 141 lbs 1.51 oz    GENERAL:  Awake. Alert. Oriented x 3. NAD.   HEENT:  No scleral icterus. Mucous membranes moist.   CV:  RRR. S1, S2. No murmurs appreciated.   RESPIRATORY:  Lungs CTAB with no wheezing, rales, rhonchi.   GI:  Abdomen soft, non-distended. Active bowel sounds. No tenderness, guarding, or rebound. No mass or HSM.    NEUROLOGICAL:  No focal deficits. Moves all extremities.    EXTREMITIES:  No peripheral edema. No calf tenderness. Intact bilateral pedal pulses.   SKIN:  No rash.      Data   ROUTINE IP LABS (Last four results)  Recent Labs   Lab 05/23/19  0445 05/22/19  0335 05/21/19  1639 05/21/19  1103 05/20/19  1942    139  --  143 143   POTASSIUM 3.8 3.8  --  4.1 3.9   CHLORIDE 108 106  --  107 106   CO2 28 30  --  31 33*   ANIONGAP 4 3  --  5 4   GLC 76 109*  --   107* 94   BUN 17 10  --  20 16   CR 2.97* 2.24*  --  3.91* 3.61*   FRANCES 7.1* 7.6*  --  7.2* 7.1*   MAG  --  1.6 1.6  --   --    PHOS 3.2 1.9*  --  3.3  --    PROTTOTAL 4.8* 5.0*  --   --  5.8*   ALBUMIN 1.2* 1.2*  --  1.3* 1.4*   BILITOTAL 0.4 0.1*  --   --  0.3   ALKPHOS 100 102  --   --  125   AST 18 16  --   --  21   ALT 10 10  --   --  12     Recent Labs   Lab 05/23/19  0445 05/22/19  0335 05/21/19  2108 05/21/19  1103   WBC 8.2 7.6 9.6 8.7   RBC 2.84* 2.48* 2.73* 2.55*   HGB 8.0* 7.0* 7.8* 7.2*   HCT 26.8* 24.0* 26.4* 24.6*   MCV 94 97 97 97   MCH 28.2 28.2 28.6 28.2   MCHC 29.9* 29.2* 29.5* 29.3*   RDW 15.2* 14.6 14.6 14.8    327 353 342     Recent Labs   Lab 05/22/19  0335   INR 0.99        Glucose Values Latest Ref Rng & Units 5/21/2019 5/21/2019 5/22/2019 5/22/2019 5/23/2019 5/23/2019 5/23/2019   Bedside Glucose (mg/dl )  - -- -- -- -- -- -- --   GLUCOSE 70 - 99 mg/dL 107(H) 119(H) 109(H) 128(H) 76 74 134(H)   Some recent data might be hidden        All labs personally reviewed in [facility].  See A&P for additional results.     Unresulted Labs Ordered in the Past 30 Days of this Admission     Date and Time Order Name Status Description    5/22/2019 1202 AFB Culture Non Blood In process     5/22/2019 1202 AFB Stain Non Blood In process     5/21/2019 1410 Sputum Culture Aerobic Bacterial Preliminary

## 2019-05-23 NOTE — PROGRESS NOTES
Cardiology Progress Note  05/23/2019       Subjective:     Patient reports a few episodes of chest pain overnight, and Pt noted to have three isolated episodes of SVT overnight. Pt is unsure if they matched up to these runs. Pt denies chest pain this AM. HRs are stable in 60s, but HR increases to 130s-140s and desats to 80s upon sitting up or standing, but HRs improve quickly and oxygen level stabilize within a few minutes.    Remainder of 4 point ROS reviewed and negative except as above.      Objective:   Vitals: /72 (BP Location: Left arm)   Pulse 69   Temp 98.1  F (36.7  C) (Axillary)   Resp 20   Wt 64.5 kg (142 lb 1.6 oz)   SpO2 99%   BMI 24.39 kg/m     Vitals:    05/21/19 0729 05/22/19 0349 05/23/19 0318   Weight: 65.3 kg (144 lb) 69.6 kg (153 lb 7 oz) 64.5 kg (142 lb 1.6 oz)     Physical Exam:  GEN: pleasant, no acute distress  HEENT: no icterus  CV: RRR, normal s1/s2, no murmurs  CHEST: coarse breath sounds anteriorally  ABD: soft, non-tender, normal active bowel sounds  EXTR: pulses 2+. No clubbing, cyanosis or edema  NEURO: alert and oriented, speech fluent/appropriate, motor grossly nonfocal    Diagnostics:  Labs and images personally reviewed.    Echo: reviewed  Tele: reviewed  EKG: reviewed    Meds per EPIC EMR:    sodium chloride 0.9%  250 mL Intravenous Once in dialysis     sodium chloride 0.9%  300 mL Hemodialysis Machine Once     ammonium lactate   Topical BID     aspirin  81 mg Oral Daily     atorvastatin  40 mg Oral Daily     docusate sodium  100 mg Oral BID     doxercalciferol  2 mcg Intravenous Once in dialysis     epoetin jacobo (EPOGEN,PROCRIT) inj ESRD  4,000 Units Intravenous Once in dialysis     fluticasone  1 spray Both Nostrils Daily     fluticasone-vilanterol  1 puff Inhalation Daily     gelatin absorbable  1 each Topical During Hemodialysis (from stock)     ipratropium - albuterol 0.5 mg/2.5 mg/3 mL  3 mL Nebulization 4x daily     levothyroxine  200 mcg Oral Daily     -  MEDICATION INSTRUCTIONS -   Does not apply Once     senna-docusate  1 tablet Oral BID    Or     senna-docusate  2 tablet Oral BID     sodium bicarbonate  650 mg Oral BID     sodium chloride (PF)  3 mL Intracatheter Q8H     sodium chloride (PF)  9 mL Intracatheter During Hemodialysis (from stock)     sodium chloride (PF)  9 mL Intracatheter During Hemodialysis (from stock)     tiotropium  18 mcg Inhalation Daily     vitamin D3  2,000 Units Oral Daily       Assessment and Recommendation:   Kim Anne is a 74 yo female who was admitted on 5/21/2019. She has a history of ESRD on hemodialysis, anemia, HTN, DM, tobacco abuse, COPD, hx of tuberculosis (s/p treatment in 1970's), as well as hypoparathyroidism, hypocalcemia and hypothyroidism who presented to  ED complaining of episodic chest tightness and dyspnea x1 day.     # Chest Pain with Mildly Elevated, but Flat Troponins  # Possible SVT vs Atrial Tachycardia  Possible arrythmia-related as Pt does have hx of SVT and HD RN reports episodes of CP associated with wide-complex tachycardia runs. Hx of CAD, but no records available and Pt does not know details of this. Recently dropping Hb in last several months, now on HD with Epo and Iron. Possible unstable angina, but unclear at this time. ECG with sinus tachycardia and QTc prolongation of 522 with infrequent PVCs (1903), however after review of ECG this appears the rate was in 150s which could also represent supraventricular arrhythmia vs aflutter. No ST-T wave changes. ECG from 1945 with NSR with improved QTc and ECG at 2155 with irregular rhythm without ST-T wave changes with frequent PVCs with QTc 447. Phos WNL. Mg 1.6. K WNL. Patient with elevated troponin from 0.041-->0.048 -->0.049 --> 0.047.  - No further troponin checks needed now that peaked  - Recommend continuing low intensity hep gtt until procedure and monitoring Hb closely after transfusion, given Pt's Hb has been stable with this, plan is to  load with plavix 600 mg this AM and do coronary angiogram this afternoon (already given  on 5/20, continue 81 mg daily ongoing) (hx of contrast allergy, so will pre-treat with solumedrol and benadryl)  - NPO since MN  - Repeat EKG with recurrence of CP and monitor on tele to see if any further arrhythmias  - Continue infectious evaluation per primary service     I have seen and discussed the patient with staff attending, Dr. Shaw, who agrees with the above.    Debra Camarillo MD  Cardiology Fellow      Brief Addendum:  Coronary angiogram showed nonobstructive CAD, but frequent runs of SVT with decrease in pressures and recurrence of pains with these episodes, so CP seems consistent with symptomatic SVT.  - No need to continue plavix at this time, but would continue ASA 81 mg daily given some degree of CAD  - Recommend EP consult for more definitive management of SVT    General Cardiology will sign off at this time.    Debra Camarillo MD  Cardiology Fellow      Staff Physician Comments:     I personally interviewed and examined Kim Anne today, and agree with cardiology residents assessment and plan as documented above.      Lynsey Shaw MD     Division of Cardiology

## 2019-05-23 NOTE — PLAN OF CARE
NEURO: A&Ox4 this morning, sleepy after angio this afternoon  TELE: SR with occasional runs of SVT in the 130s-160s, per cath lab she has poor pulsatility on A-line during SVT runs   VITALS: Stable  CV: RRR, pulses strong, +1 BLE edema  PULM: LS CTA, equal, sats stable on RA  GI: BS+, had 2 large soft BMs this shift, NPO all day for angio  : Dialysis today  SKIN: Intact  LDA: Bilat PIVs  GTT: Hep at 950 units/hr, 10A therapeutic  PAIN: Denies  ACTIVITY: Up with 1A, currently bed rest until 1645  PLAN: Continue cares on 6B, monitor groin site, currently soft with no bruising

## 2019-05-23 NOTE — PLAN OF CARE
/72 (BP Location: Left arm)   Pulse 69   Temp 98.1  F (36.7  C) (Axillary)   Resp 20   Wt 64.5 kg (142 lb 1.6 oz)   SpO2 99%   BMI 24.39 kg/m     Neuro: A&Ox4. Follows commands.  Cardiac: SR/ST. HR  60s-70s at rest and 120s with exertion. Had a 5 beat run of v-tach at 12:30am per tele tech. Afebrile.  Respiratory: Sating >95% on 2L NC. Crackles in bases. SOB and dyspnea w/ exertion.  GI/: On HD but makes some urine. BM X0. Stool softeners given. Active BS. Passing gas.  Diet/appetite: Tolerating regular diet. NPO at midnight d/t procedure today.  Activity:  SBA up to commode.  Pain: Denies  Skin: No new deficits noted.  LDA's: Rt and lft PIV intact. Hep gtt at therapeutic rate of 950 unit/hr.     Plan: Continue with POC. Notify primary team with changes.

## 2019-05-24 ENCOUNTER — APPOINTMENT (OUTPATIENT)
Dept: ULTRASOUND IMAGING | Facility: CLINIC | Age: 74
DRG: 286 | End: 2019-05-24
Attending: PHYSICIAN ASSISTANT
Payer: COMMERCIAL

## 2019-05-24 LAB
ACID FAST STN SPEC QL: NORMAL
ACID FAST STN SPEC QL: NORMAL
ANION GAP SERPL CALCULATED.3IONS-SCNC: 5 MMOL/L (ref 3–14)
BACTERIA SPEC CULT: ABNORMAL
BUN SERPL-MCNC: 13 MG/DL (ref 7–30)
CALCIUM SERPL-MCNC: 7.2 MG/DL (ref 8.5–10.1)
CHLORIDE SERPL-SCNC: 104 MMOL/L (ref 94–109)
CO2 SERPL-SCNC: 28 MMOL/L (ref 20–32)
CREAT SERPL-MCNC: 2.12 MG/DL (ref 0.52–1.04)
ERYTHROCYTE [DISTWIDTH] IN BLOOD BY AUTOMATED COUNT: 14.9 % (ref 10–15)
GFR SERPL CREATININE-BSD FRML MDRD: 22 ML/MIN/{1.73_M2}
GLUCOSE BLDC GLUCOMTR-MCNC: 111 MG/DL (ref 70–99)
GLUCOSE BLDC GLUCOMTR-MCNC: 175 MG/DL (ref 70–99)
GLUCOSE BLDC GLUCOMTR-MCNC: 198 MG/DL (ref 70–99)
GLUCOSE SERPL-MCNC: 148 MG/DL (ref 70–99)
HCT VFR BLD AUTO: 27.8 % (ref 35–47)
HGB BLD-MCNC: 8.3 G/DL (ref 11.7–15.7)
LACTATE BLD-SCNC: 1.4 MMOL/L (ref 0.7–2)
MCH RBC QN AUTO: 28 PG (ref 26.5–33)
MCHC RBC AUTO-ENTMCNC: 29.9 G/DL (ref 31.5–36.5)
MCV RBC AUTO: 94 FL (ref 78–100)
PLATELET # BLD AUTO: 345 10E9/L (ref 150–450)
POTASSIUM SERPL-SCNC: 4 MMOL/L (ref 3.4–5.3)
RBC # BLD AUTO: 2.96 10E12/L (ref 3.8–5.2)
SODIUM SERPL-SCNC: 136 MMOL/L (ref 133–144)
SPECIMEN SOURCE: ABNORMAL
SPECIMEN SOURCE: NORMAL
WBC # BLD AUTO: 6.3 10E9/L (ref 4–11)

## 2019-05-24 PROCEDURE — 25000125 ZZHC RX 250: Performed by: PHYSICIAN ASSISTANT

## 2019-05-24 PROCEDURE — 80048 BASIC METABOLIC PNL TOTAL CA: CPT | Performed by: PHYSICIAN ASSISTANT

## 2019-05-24 PROCEDURE — 25000132 ZZH RX MED GY IP 250 OP 250 PS 637: Performed by: PHYSICIAN ASSISTANT

## 2019-05-24 PROCEDURE — 99222 1ST HOSP IP/OBS MODERATE 55: CPT | Performed by: INTERNAL MEDICINE

## 2019-05-24 PROCEDURE — 93931 UPPER EXTREMITY STUDY: CPT | Mod: LT

## 2019-05-24 PROCEDURE — 25000131 ZZH RX MED GY IP 250 OP 636 PS 637: Performed by: NURSE PRACTITIONER

## 2019-05-24 PROCEDURE — 99207 ZZC APP CREDIT; MD BILLING SHARED VISIT: CPT | Performed by: PHYSICIAN ASSISTANT

## 2019-05-24 PROCEDURE — 94640 AIRWAY INHALATION TREATMENT: CPT | Mod: 76

## 2019-05-24 PROCEDURE — 83605 ASSAY OF LACTIC ACID: CPT

## 2019-05-24 PROCEDURE — 12000004 ZZH R&B IMCU UMMC

## 2019-05-24 PROCEDURE — 36415 COLL VENOUS BLD VENIPUNCTURE: CPT | Performed by: PHYSICIAN ASSISTANT

## 2019-05-24 PROCEDURE — 93010 ELECTROCARDIOGRAM REPORT: CPT | Performed by: INTERNAL MEDICINE

## 2019-05-24 PROCEDURE — 40000275 ZZH STATISTIC RCP TIME EA 10 MIN

## 2019-05-24 PROCEDURE — 93005 ELECTROCARDIOGRAM TRACING: CPT

## 2019-05-24 PROCEDURE — 85027 COMPLETE CBC AUTOMATED: CPT | Performed by: PHYSICIAN ASSISTANT

## 2019-05-24 PROCEDURE — 99233 SBSQ HOSP IP/OBS HIGH 50: CPT | Performed by: INTERNAL MEDICINE

## 2019-05-24 PROCEDURE — 94640 AIRWAY INHALATION TREATMENT: CPT

## 2019-05-24 PROCEDURE — 00000146 ZZHCL STATISTIC GLUCOSE BY METER IP

## 2019-05-24 RX ORDER — METHYLPREDNISOLONE SODIUM SUCCINATE 125 MG/2ML
125 INJECTION, POWDER, LYOPHILIZED, FOR SOLUTION INTRAMUSCULAR; INTRAVENOUS
Status: DISCONTINUED | OUTPATIENT
Start: 2019-05-24 | End: 2019-05-24

## 2019-05-24 RX ORDER — METHYLPREDNISOLONE 32 MG/1
32 TABLET ORAL ONCE
Status: COMPLETED | OUTPATIENT
Start: 2019-05-24 | End: 2019-05-24

## 2019-05-24 RX ORDER — METHYLPREDNISOLONE 32 MG/1
32 TABLET ORAL ONCE
Status: COMPLETED | OUTPATIENT
Start: 2019-05-25 | End: 2019-05-25

## 2019-05-24 RX ORDER — LIDOCAINE 40 MG/G
CREAM TOPICAL
Status: CANCELLED | OUTPATIENT
Start: 2019-05-24

## 2019-05-24 RX ORDER — DILTIAZEM HCL 60 MG
60 TABLET ORAL EVERY 8 HOURS SCHEDULED
Status: DISCONTINUED | OUTPATIENT
Start: 2019-05-24 | End: 2019-05-26

## 2019-05-24 RX ORDER — METHYLPREDNISOLONE 32 MG/1
32 TABLET ORAL ONCE
Status: CANCELLED | OUTPATIENT
Start: 2019-05-24

## 2019-05-24 RX ORDER — METHYLPREDNISOLONE 32 MG/1
32 TABLET ORAL ONCE
Status: CANCELLED | OUTPATIENT
Start: 2019-05-25

## 2019-05-24 RX ADMIN — SENNOSIDES AND DOCUSATE SODIUM 1 TABLET: 8.6; 5 TABLET ORAL at 21:28

## 2019-05-24 RX ADMIN — SODIUM BICARBONATE 650 MG TABLET 650 MG: at 21:28

## 2019-05-24 RX ADMIN — SODIUM BICARBONATE 650 MG TABLET 650 MG: at 08:02

## 2019-05-24 RX ADMIN — LEVOTHYROXINE SODIUM 200 MCG: 100 TABLET ORAL at 08:02

## 2019-05-24 RX ADMIN — IPRATROPIUM BROMIDE AND ALBUTEROL SULFATE 3 ML: .5; 3 SOLUTION RESPIRATORY (INHALATION) at 08:36

## 2019-05-24 RX ADMIN — FLUTICASONE PROPIONATE 1 SPRAY: 50 SPRAY, METERED NASAL at 08:01

## 2019-05-24 RX ADMIN — DILTIAZEM HYDROCHLORIDE 60 MG: 60 TABLET, FILM COATED ORAL at 14:55

## 2019-05-24 RX ADMIN — METHYLPREDNISOLONE 32 MG: 32 TABLET ORAL at 22:18

## 2019-05-24 RX ADMIN — IPRATROPIUM BROMIDE AND ALBUTEROL SULFATE 3 ML: .5; 3 SOLUTION RESPIRATORY (INHALATION) at 11:43

## 2019-05-24 RX ADMIN — Medication: at 08:09

## 2019-05-24 RX ADMIN — TIOTROPIUM BROMIDE 18 MCG: 18 CAPSULE ORAL; RESPIRATORY (INHALATION) at 08:02

## 2019-05-24 RX ADMIN — ATORVASTATIN CALCIUM 40 MG: 40 TABLET, FILM COATED ORAL at 08:02

## 2019-05-24 RX ADMIN — IPRATROPIUM BROMIDE AND ALBUTEROL SULFATE 3 ML: .5; 3 SOLUTION RESPIRATORY (INHALATION) at 20:29

## 2019-05-24 RX ADMIN — ASPIRIN 81 MG: 81 TABLET, COATED ORAL at 08:02

## 2019-05-24 RX ADMIN — MELATONIN 2000 UNITS: at 08:02

## 2019-05-24 RX ADMIN — Medication: at 21:29

## 2019-05-24 RX ADMIN — IPRATROPIUM BROMIDE AND ALBUTEROL SULFATE 3 ML: .5; 3 SOLUTION RESPIRATORY (INHALATION) at 15:17

## 2019-05-24 RX ADMIN — DOCUSATE SODIUM 100 MG: 100 CAPSULE, LIQUID FILLED ORAL at 21:28

## 2019-05-24 RX ADMIN — FLUTICASONE FUROATE AND VILANTEROL TRIFENATATE 1 PUFF: 100; 25 POWDER RESPIRATORY (INHALATION) at 08:01

## 2019-05-24 ASSESSMENT — ACTIVITIES OF DAILY LIVING (ADL)
ADLS_ACUITY_SCORE: 18

## 2019-05-24 ASSESSMENT — PAIN DESCRIPTION - DESCRIPTORS: DESCRIPTORS: DULL;ACHING

## 2019-05-24 NOTE — PLAN OF CARE
Neuro: A&Ox4. Forgetful at times  Cardiac: SR/ST HR 's. Seen by EP started on Cardizem. Low SBP in L versus R arm; MD aware. Hold Cardizem per MD. Left upper extremity US completed.  Respiratory: Sating 93 on 0.5-1L O2 via [place]. Desats to 85 without O2.  GI/: HD pt; make little urine. No BM this shift.  Diet/appetite: Tolerating renal diet. Eating well.  Activity:  Assist of 1, up to chair.  Pain: At acceptable level on current regimen.   Skin: No new deficits noted. Non blanchable redness to coccyx, weight shift q2hr.  LDA's: PIV to R and L; SL.    Plan: Continue with POC. Notify primary team with changes.

## 2019-05-24 NOTE — PLAN OF CARE
/65 (BP Location: Left arm)   Pulse 61   Temp 98.4  F (36.9  C)   Resp 20   Wt 63 kg (138 lb 14.2 oz)   SpO2 98%   BMI 23.84 kg/m     Neuro: A&Ox4. Follows commands.  Cardiac: SR HR  60s-80s. Run of SVT at 22:01 for 2 min. per tele tech. Afebrile.  Respiratory: Sating >95% on 1/2 L NC. Diminished in bases. SOB and dyspnea w/ exertion.  GI/: On HD but makes some urine. BM X 2. Active BS.  Diet/appetite: Tolerating regular diet.  Activity:  SBA up to commode.  Pain: Denies  Skin: No new deficits noted.  LDA's: Rt and lft PIV intact.       Plan: Continue with POC. Notify primary team with changes.

## 2019-05-24 NOTE — PROVIDER NOTIFICATION
Time of notification: 6:49 PM  Provider notified: [employer] cover  Patient status: Clarified with NP that if this writer should hold Cardizem tonight since the day team PA said to hold off on it.  Temp:  [98.3  F (36.8  C)-98.6  F (37  C)] 98.3  F (36.8  C)  Pulse:  [] 81  Heart Rate:  [] 79  Resp:  [16-20] 16  BP: ()/(41-73) 123/53  SpO2:  [90 %-99 %] 97 %  Orders received: NP said pt can have the Cardizem tonight.
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
## 2019-05-24 NOTE — PROGRESS NOTES
[facility], [place]    Medicine Progress Note - Hospitalist Service, Gold 7       Date of Admission:  5/20/2019  Assessment & Plan Kim Anne is a 73 year old female admitted on 5/20/2019. 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 She is admitted for evaluation of exertional chest pain and dyspnea.     # Asymmetric upper extremity BP and pulse:  Contacted by RN re: hypotension following diltiazem. SBP 70's on LUE. Patient asymptomatic. Repeated BP on RUE with 's. RN continued to repeat BP's on both extremities with 's on the right and 70's on the left. Left radial pulse diminished and delayed in comparison to right. DDx includes aortic dissection vs arterial blockage. No ischemic symptoms to LUE. No swelling or mottling noted on exam. No chest pain or LUE pain reported. Case discussed with cardiology team.   - STAT Arterial duplex US to evaluate for possible stenosis/clot  - If arterial US negative, then will proceed with CTA to further rule out dissection  - IV solu-medrol ordered PRN for CTA if needed  - On call providers updated    # Exertional chest pain and dyspnea:  Initial concern for unstable angina given slightly elevated, but stable, troponin. Echo 5/21 showed normal LV function (EF 60-65%), no wall motion abnormalities, normal RV function, and elevated PA pressures c/w pulmonary hypertension. Cardiology consulted, s/p coronary angio 5/23 which showed only mild CAD (LAD 30% stenosis, RCA 30-60% stenosis). No intervention. Patient noted to have SVT during procedure with associated hypotension. Cardiology suspects symptomatic SVT. Continues to have intermittent pSVT/NSVT per telemetry review.    - Off heparin gtt  - Continue ASA 81mg daily per cards recs  - EP consult as below  - Consider repeat EKG  for recurrent symptoms    # Symptomatic paroxysmal SVT and non-sustained VT:  Intermittently noted on telemetry, occasionally noted with symptoms of chest pain and dyspnea. Noted to have frequent runs of SVT during heart cath 5/23 with associated hypotension. Cardiology suspect presenting symptoms d/t arrhythmia. Overnight noted to have several runs of pSVT, the longest lasting 2 minutes and followed a 9 beat run of VT. BP improved today. HR 60's at baseline.   - Continue telemetry  - EP consulted for recommendations  - No plans for EP study today (likely won't happen until Tuesday)  - Start Cardizem 60mg q 8h with hold parameters (SBP <110 or HR <55)    # Acute hypoxic respiratory failure:  Suspect poor pulmonary reserve in setting of COPD, with acute desaturations due to pSVT and reduced cardiac output. Lungs clear on exam. Repeat CXR 5/23 with bilateral pleural effusions, R>L, and atelectasis. Sputum culture 5/21 grew Hemophilus influenzae and Moraxella catarrhalis, but no clinical signs/symptoms of active pneumonia; suspect colonization.  - O2 prn to maintain sats >90%.   - Monitor for fevers, worsening cough or increased sputum production    # COPD:  No wheezing on exam. Cont breo ellipta, spiriva, and duoneb QID.     # Hx LTBI with positive quantiferon gold:  Reportedly treated in 1970's. No history of new exposures. CXR negative for evidence of active TB. ID consulted. No indication for further evaluation or airborne isolation d/t positive quantiferon gold alone. Single sputum sent for AFB stain/culture, currently pending.   - Airborne isolation discontinued  - Follow up on pending AFB cultures     # Type II DM:  Diet controlled. A1C 5.2 on 5/1/19. Adequately controlled. Borderline hypoglycemia d/t NPO status. Now improved.   - Glucose checks AC/HS  - Hypoglycemia protocol ordered     # HTN:  PTA amlodipine and lasix held d/t low BP secondary to SVT. Pressures slightly improved but still having frequent runs  of SVT. Now hypotensive, likely related to SVT.   - Continue to hold PTA meds  - Monitor    # ESRD on HD; Hx solitary kidney:  Secondary to diabetic nephropathy. HD initiated 5/2/19, currently TTS via RIJ tunneled catheter. No acute concerns.   - Nephrology consulted  - HD q TTS   - Daily weights  - Daily BMP, Mg, Phos    # Chronic anemia:  Likely anemia of chronic renal disease. Hgb stable.   - Repeat CBC in AM    # Hypothyroidism:  TSH 15 on admission, likely d/t medication non-compliance. Prior TSH 1.12 on current dose of levothyroxine, indicating adequate replacement when taking regularly.    - Continue levothyroxine 200mcg daily  - Repeat TFT's in 4-6 weeks    # Tobacco abuse:  Encouraged cessation.        Diet: NPO per Anesthesia Guidelines for Procedure/Surgery Except for: Meds    DVT Prophylaxis: IV heparin  Chaney Catheter: not present  Code Status: Full Code      Disposition Plan   Expected discharge: 2 - 3 days, recommended to prior living arrangement once diagnostics completed and symptoms improved.  Entered: Emery Hampton PA-C 05/24/2019, 10:26 AM       The patient's care was discussed with the Attending Physician, Dr. Porras.    Emery Hampton PA-C  Hospitalist Service, [address], [place]  Pager: [phone]  Please see sticky note for cross cover information  ______________________________________________________________________    Interval History   Continues to have intermittent dyspnea, chest pressure and palpitations. Usually with exertion, but occasionally at rest. Denies any fevers, chills or increase in sputum production. No GI or  complaints.     Data reviewed today: I reviewed all medications, new labs and imaging results over the last 24 hours. I personally reviewed no images or EKG's today.    Physical Exam   Vital Signs: Temp: 98.3  F (36.8  C) Temp src: Oral BP: 125/70   Heart Rate: 68 Resp: 18 SpO2: 96 % O2 Device: Nasal cannula Oxygen Delivery: 1/2  LPM  Weight: 138 lbs 14.24 oz    GENERAL:  Awake. Alert. Oriented x 3. NAD. RIJ tunneled catheter.   HEENT:  No scleral icterus. Mucous membranes moist.   CV:  RRR. S1, S2. IV systolic murmur.   RESPIRATORY:  Lungs CTAB with no wheezing, rales, rhonchi.   GI:  Abdomen soft, non-distended. Active bowel sounds. No tenderness.    NEUROLOGICAL:  No focal deficits. Moves all extremities.    EXTREMITIES:  No peripheral edema. No calf tenderness. Intact bilateral pedal pulses.   SKIN:  No rash.      Data   ROUTINE IP LABS (Last four results)  Recent Labs   Lab 05/24/19 0435 05/23/19 0445 05/22/19  0335 05/21/19  1639 05/21/19  1103 05/20/19  1942    140 139  --  143 143   POTASSIUM 4.0 3.8 3.8  --  4.1 3.9   CHLORIDE 104 108 106  --  107 106   CO2 28 28 30  --  31 33*   ANIONGAP 5 4 3  --  5 4   * 76 109*  --  107* 94   BUN 13 17 10  --  20 16   CR 2.12* 2.97* 2.24*  --  3.91* 3.61*   FRANCES 7.2* 7.1* 7.6*  --  7.2* 7.1*   MAG  --   --  1.6 1.6  --   --    PHOS  --  3.2 1.9*  --  3.3  --    PROTTOTAL  --  4.8* 5.0*  --   --  5.8*   ALBUMIN  --  1.2* 1.2*  --  1.3* 1.4*   BILITOTAL  --  0.4 0.1*  --   --  0.3   ALKPHOS  --  100 102  --   --  125   AST  --  18 16  --   --  21   ALT  --  10 10  --   --  12     Recent Labs   Lab 05/24/19 0435 05/23/19 0445 05/22/19  0335 05/21/19  2108   WBC 6.3 8.2 7.6 9.6   RBC 2.96* 2.84* 2.48* 2.73*   HGB 8.3* 8.0* 7.0* 7.8*   HCT 27.8* 26.8* 24.0* 26.4*   MCV 94 94 97 97   MCH 28.0 28.2 28.2 28.6   MCHC 29.9* 29.9* 29.2* 29.5*   RDW 14.9 15.2* 14.6 14.6    320 327 353     Recent Labs   Lab 05/22/19  0335   INR 0.99        Glucose Values Latest Ref Rng & Units 5/22/2019 5/22/2019 5/23/2019 5/23/2019 5/23/2019 5/24/2019 5/24/2019   Bedside Glucose (mg/dl )  - -- -- -- -- -- -- --   GLUCOSE 70 - 99 mg/dL 109(H) 128(H) 76 74 134(H) 175(H) 148(H)   Some recent data might be hidden        All labs personally reviewed in [facility].  See A&P for additional  results.     Unresulted Labs Ordered in the Past 30 Days of this Admission     Date and Time Order Name Status Description    5/22/2019 1202 AFB Culture Non Blood Preliminary     5/21/2019 1410 Sputum Culture Aerobic Bacterial Preliminary

## 2019-05-24 NOTE — PROGRESS NOTES
Time of notification: 3:53 PM  Provider notified: Gold 7 PA  Patient status: notified PA that pt's  BP is 78/48 on the L arm and R arm is 124/52. Pt does c/o of lightheaded when up in the commode.  Temp:  [98.3  F (36.8  C)-98.6  F (37  C)] 98.3  F (36.8  C)  Pulse:  [] 81  Heart Rate:  [] 80  Resp:  [16-20] 16  BP: ()/(47-78) 72/47  SpO2:  [90 %-99 %] 97 %  Orders received:  MD at bedside to assess the patient. MD said to hold off on Cardizem at this time.

## 2019-05-25 ENCOUNTER — APPOINTMENT (OUTPATIENT)
Dept: CT IMAGING | Facility: CLINIC | Age: 74
DRG: 286 | End: 2019-05-25
Attending: NURSE PRACTITIONER
Payer: COMMERCIAL

## 2019-05-25 LAB
ANION GAP SERPL CALCULATED.3IONS-SCNC: 4 MMOL/L (ref 3–14)
BUN SERPL-MCNC: 20 MG/DL (ref 7–30)
CALCIUM SERPL-MCNC: 7.3 MG/DL (ref 8.5–10.1)
CHLORIDE SERPL-SCNC: 106 MMOL/L (ref 94–109)
CO2 SERPL-SCNC: 27 MMOL/L (ref 20–32)
CREAT SERPL-MCNC: 3.18 MG/DL (ref 0.52–1.04)
ERYTHROCYTE [DISTWIDTH] IN BLOOD BY AUTOMATED COUNT: 15.4 % (ref 10–15)
GFR SERPL CREATININE-BSD FRML MDRD: 14 ML/MIN/{1.73_M2}
GLUCOSE BLDC GLUCOMTR-MCNC: 212 MG/DL (ref 70–99)
GLUCOSE SERPL-MCNC: 165 MG/DL (ref 70–99)
HCT VFR BLD AUTO: 31.8 % (ref 35–47)
HGB BLD-MCNC: 9.4 G/DL (ref 11.7–15.7)
MAGNESIUM SERPL-MCNC: 1.3 MG/DL (ref 1.6–2.3)
MCH RBC QN AUTO: 28.4 PG (ref 26.5–33)
MCHC RBC AUTO-ENTMCNC: 29.6 G/DL (ref 31.5–36.5)
MCV RBC AUTO: 96 FL (ref 78–100)
PLATELET # BLD AUTO: 382 10E9/L (ref 150–450)
POTASSIUM SERPL-SCNC: 4.3 MMOL/L (ref 3.4–5.3)
RADIOLOGIST FLAGS: ABNORMAL
RBC # BLD AUTO: 3.31 10E12/L (ref 3.8–5.2)
SODIUM SERPL-SCNC: 138 MMOL/L (ref 133–144)
WBC # BLD AUTO: 9.4 10E9/L (ref 4–11)

## 2019-05-25 PROCEDURE — 99233 SBSQ HOSP IP/OBS HIGH 50: CPT | Performed by: INTERNAL MEDICINE

## 2019-05-25 PROCEDURE — 71275 CT ANGIOGRAPHY CHEST: CPT

## 2019-05-25 PROCEDURE — 25000132 ZZH RX MED GY IP 250 OP 250 PS 637: Performed by: PHYSICIAN ASSISTANT

## 2019-05-25 PROCEDURE — 00000146 ZZHCL STATISTIC GLUCOSE BY METER IP

## 2019-05-25 PROCEDURE — 90937 HEMODIALYSIS REPEATED EVAL: CPT

## 2019-05-25 PROCEDURE — 25000125 ZZHC RX 250: Performed by: PHYSICIAN ASSISTANT

## 2019-05-25 PROCEDURE — 12000004 ZZH R&B IMCU UMMC

## 2019-05-25 PROCEDURE — 94640 AIRWAY INHALATION TREATMENT: CPT

## 2019-05-25 PROCEDURE — 83735 ASSAY OF MAGNESIUM: CPT | Performed by: PHYSICIAN ASSISTANT

## 2019-05-25 PROCEDURE — 85027 COMPLETE CBC AUTOMATED: CPT | Performed by: PHYSICIAN ASSISTANT

## 2019-05-25 PROCEDURE — 25000128 H RX IP 250 OP 636: Performed by: INTERNAL MEDICINE

## 2019-05-25 PROCEDURE — 25000128 H RX IP 250 OP 636: Performed by: RADIOLOGY

## 2019-05-25 PROCEDURE — 36415 COLL VENOUS BLD VENIPUNCTURE: CPT | Performed by: PHYSICIAN ASSISTANT

## 2019-05-25 PROCEDURE — 99207 ZZC APP CREDIT; MD BILLING SHARED VISIT: CPT | Performed by: PHYSICIAN ASSISTANT

## 2019-05-25 PROCEDURE — 63400005 ZZH RX 634: Performed by: INTERNAL MEDICINE

## 2019-05-25 PROCEDURE — 80048 BASIC METABOLIC PNL TOTAL CA: CPT | Performed by: PHYSICIAN ASSISTANT

## 2019-05-25 PROCEDURE — 25000131 ZZH RX MED GY IP 250 OP 636 PS 637: Performed by: NURSE PRACTITIONER

## 2019-05-25 PROCEDURE — 40000275 ZZH STATISTIC RCP TIME EA 10 MIN

## 2019-05-25 RX ORDER — IOPAMIDOL 755 MG/ML
125 INJECTION, SOLUTION INTRAVASCULAR ONCE
Status: COMPLETED | OUTPATIENT
Start: 2019-05-25 | End: 2019-05-25

## 2019-05-25 RX ORDER — DOXERCALCIFEROL 4 UG/2ML
2 INJECTION INTRAVENOUS
Status: COMPLETED | OUTPATIENT
Start: 2019-05-25 | End: 2019-05-25

## 2019-05-25 RX ORDER — MAGNESIUM SULFATE HEPTAHYDRATE 40 MG/ML
4 INJECTION, SOLUTION INTRAVENOUS EVERY 4 HOURS PRN
Status: DISCONTINUED | OUTPATIENT
Start: 2019-05-25 | End: 2019-05-30 | Stop reason: HOSPADM

## 2019-05-25 RX ORDER — IPRATROPIUM BROMIDE AND ALBUTEROL SULFATE 2.5; .5 MG/3ML; MG/3ML
3 SOLUTION RESPIRATORY (INHALATION) EVERY 4 HOURS PRN
Status: DISCONTINUED | OUTPATIENT
Start: 2019-05-25 | End: 2019-05-30 | Stop reason: HOSPADM

## 2019-05-25 RX ADMIN — EPOETIN ALFA 4000 UNITS: 10000 SOLUTION INTRAVENOUS; SUBCUTANEOUS at 14:51

## 2019-05-25 RX ADMIN — SODIUM CHLORIDE 250 ML: 9 INJECTION, SOLUTION INTRAVENOUS at 14:53

## 2019-05-25 RX ADMIN — ASPIRIN 81 MG: 81 TABLET, COATED ORAL at 07:44

## 2019-05-25 RX ADMIN — IPRATROPIUM BROMIDE AND ALBUTEROL SULFATE 3 ML: .5; 3 SOLUTION RESPIRATORY (INHALATION) at 09:02

## 2019-05-25 RX ADMIN — Medication 2 G: at 21:05

## 2019-05-25 RX ADMIN — FLUTICASONE FUROATE AND VILANTEROL TRIFENATATE 1 PUFF: 100; 25 POWDER RESPIRATORY (INHALATION) at 07:45

## 2019-05-25 RX ADMIN — SODIUM BICARBONATE 650 MG TABLET 650 MG: at 19:58

## 2019-05-25 RX ADMIN — DILTIAZEM HYDROCHLORIDE 60 MG: 60 TABLET, FILM COATED ORAL at 20:15

## 2019-05-25 RX ADMIN — FLUTICASONE PROPIONATE 1 SPRAY: 50 SPRAY, METERED NASAL at 07:45

## 2019-05-25 RX ADMIN — DOCUSATE SODIUM 100 MG: 100 CAPSULE, LIQUID FILLED ORAL at 07:43

## 2019-05-25 RX ADMIN — Medication: at 20:00

## 2019-05-25 RX ADMIN — Medication: at 07:44

## 2019-05-25 RX ADMIN — Medication: at 14:54

## 2019-05-25 RX ADMIN — SENNOSIDES AND DOCUSATE SODIUM 1 TABLET: 8.6; 5 TABLET ORAL at 07:44

## 2019-05-25 RX ADMIN — SENNOSIDES AND DOCUSATE SODIUM 1 TABLET: 8.6; 5 TABLET ORAL at 19:58

## 2019-05-25 RX ADMIN — DOXERCALCIFEROL 2 MCG: 4 INJECTION, SOLUTION INTRAVENOUS at 14:51

## 2019-05-25 RX ADMIN — METHYLPREDNISOLONE 32 MG: 32 TABLET ORAL at 07:44

## 2019-05-25 RX ADMIN — ATORVASTATIN CALCIUM 40 MG: 40 TABLET, FILM COATED ORAL at 07:44

## 2019-05-25 RX ADMIN — DOCUSATE SODIUM 100 MG: 100 CAPSULE, LIQUID FILLED ORAL at 19:59

## 2019-05-25 RX ADMIN — TIOTROPIUM BROMIDE 18 MCG: 18 CAPSULE ORAL; RESPIRATORY (INHALATION) at 07:44

## 2019-05-25 RX ADMIN — SODIUM CHLORIDE 300 ML: 9 INJECTION, SOLUTION INTRAVENOUS at 14:53

## 2019-05-25 RX ADMIN — SODIUM BICARBONATE 650 MG TABLET 650 MG: at 07:44

## 2019-05-25 RX ADMIN — LEVOTHYROXINE SODIUM 200 MCG: 100 TABLET ORAL at 07:44

## 2019-05-25 RX ADMIN — MELATONIN 2000 UNITS: at 07:43

## 2019-05-25 RX ADMIN — IOPAMIDOL 125 ML: 755 INJECTION, SOLUTION INTRAVENOUS at 11:13

## 2019-05-25 ASSESSMENT — ACTIVITIES OF DAILY LIVING (ADL)
ADLS_ACUITY_SCORE: 18

## 2019-05-25 NOTE — PROVIDER NOTIFICATION
Discussed with gold cross-cover MD if team wanted patient to get PO cadizem overnight. Per day shift RN the day MDs wanted to hold cardizem but evening MD/NP was okay to give med overnight. Explained that on assessment around 2130 pt had some dull, non-radiating chest pain, which patient has came into hospital with, that spontaneously resolved before talking to MD. RN also heard audible murmur that was not charted as present by previous RNs so just wanted MDs to be aware. Bps still varying per each arm but slightly improved on left. SBP on L arm ranged from 70s-90s this shift, R arm SBPs ranging from 130s-140s. HR in SR 70s-90s most of the time but throughout night shift has intermittently had episodes that appeared to be short Afib episodes of tachycardia/irregularity, HR capping at 130s. RN ordered stat EKG when patient having more frequent irregularity epsiodes, but was SR w/ PACs during time of EKG. MD verified to hold all cardizem doses (including 2200 dose not given yet and 6-7am dose) until CTA is obtained, or if patient develops worsened cardiac symptoms overnight.

## 2019-05-25 NOTE — PROVIDER NOTIFICATION
Clarified with PA if PO Cardizem still needs to be held tonight. Discussed with PA that this morning this RN was told to hold off on the PO Cardizem, also mentioned PA that the notes says to start the Cardizem. PA said to continue with PO Cardizem, hold the Cardizem if R arm BP is less 110 and HR is less than 55.

## 2019-05-25 NOTE — PROVIDER NOTIFICATION
Notified MD that pt's HR has been going in and out of -150's which lasts for about 30 sec to 1 minute and HR goes back to SR 70-80's afterwards, pt asymptomatic, VSS, On RA. Pt did had dull chest pain overnight, none reported this morning. CTA is scheduled @ 10 am today. Updated PA this writer did not heard any murmur yesterday, but did heard a faint murmer today. PA said he is aware about the murmur and continue to monitor the patient closely and continue to hold Cardizem at this time.

## 2019-05-25 NOTE — PROGRESS NOTES
HEMODIALYSIS TREATMENT NOTE    Date: 5/25/2019  Time: 4:36 PM    Data:  Pre Wt: 63.2    Desired Wt: 63 kg   Post Wt:  63  Weight gain:0   kg   Weight change: -0.2   kg  Ultrafiltration - Post Run Net Total Removed (mL): 800 mL  Ultrafiltration - Post Run Net Total Gain (mL): 0 mL  Vascular Access Status: Yes, secured and visible  Dialyzer Rinse: Light  Total Blood Volume Processed: 69.6  Total Dialysis (Treatment) Time:  3 hrs    Lab:   No    Interventions:  Epo, iron and hectorol given during treatment. Handoff given to floor RN    Assessment:  Pt vitally stable through out with no complications. Pt tolerated dialysis very well.     Plan:    Will continue to monitor and follow POC per renal team.

## 2019-05-25 NOTE — PROGRESS NOTES
[facility], [facility] Progress Note - Hospitalist Service, Gold 7       Date of Admission:  5/20/2019  Assessment & Plan Kim Anne is a 73 year old female admitted on 5/20/2019. 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 She is admitted for evaluation of exertional chest pain and dyspnea.     # Occluded L subclavian stent:  Asymmetric BP noted 5/24 with SBP 70's on left and 120's on right. Delayed and diminished L radial pulse also noted on exam. LUE arterial US negative for focal stenosis or occlusion. CTA negative for dissection but shows occlusion of previously placed L subclavian stent. Case discussed with IR. Appears to have good collaterals. Currently no ischemic symptoms, therefore no indication for inpatient intervention.   - Monitor for symptoms of ischemia  - Will need OP follow up with IR for definitive treatment with angioplasty  - Continue aspirin    # Exertional chest pain and dyspnea:  Initial concern for unstable angina given slightly elevated, but stable, troponin. Echo 5/21 showed normal LV function (EF 60-65%), no wall motion abnormalities, normal RV function, and elevated PA pressures c/w pulmonary hypertension. Cardiology consulted, s/p coronary angio 5/23 which showed non-obstructive CAD. No intervention. SVT noted during heart cath with associated hypotension. Cardiology suspects symptomatic SVT. Clinically improved minimally symptomatic with ongoing pSVT.   - EP consulted, see below   - Continue ASA 81mg daily per cards recs  - Consider repeat EKG for recurrent symptoms    # Symptomatic paroxysmal SVT and non-sustained VT:  See above. Noted to have frequent runs of SVT during heart cath 5/23 with associated hypotension. Cardiology suspect presenting symptoms d/t arrhythmia. Continues to  have brief paroxysms SVT. ? A-fib on telemetry overnight. Minimally symptomatic today.   - Continue telemetry  - EP consulted, no plans for EP study today (likely won't happen until Tuesday)  - Start Cardizem 60mg q 8h with hold parameters (SBP <110 or HR <55)    # Acute hypoxic respiratory failure:  Suspect poor pulmonary reserve in setting of COPD, with acute desaturations due to pSVT and reduced cardiac output. Lungs clear on exam. CT chest 5/25 shows moderate bilateral pleural effusions with right basilar atelectasis vs consolidation. Sputum culture 5/21 grew Hemophilus influenzae and Moraxella catarrhalis but no clinical signs/symptoms of active pneumonia, therefore suspect colonization. Doing well on RA today.   - O2 prn to maintain sats >90%.   - Monitor for fevers, worsening cough or increased sputum production  - Consider therapeutic thoracentesis if develops worsening symptoms    # COPD:  No wheezing on exam. Cont breo ellipta, spiriva, and duoneb QID.     # Hx LTBI with positive quantiferon gold:  Reportedly treated in 1970's. No history of new exposures. CXR negative for evidence of active TB. ID consulted. No indication for further evaluation or airborne isolation d/t positive quantiferon gold alone. Single sputum sent for AFB stain/culture, currently pending.   - Follow up on pending AFB cultures, currently NGTD    # Type II DM:  Diet controlled. A1C 5.2 on 5/1/19. Adequately controlled. Borderline hypoglycemia d/t NPO status. Now improved.   - Glucose checks AC/HS  - Hypoglycemia protocol ordered     # HTN:  PTA amlodipine and lasix held d/t low BP secondary to SVT. Pressures slightly improved but still having frequent runs of SVT. Now hypotensive, likely related to SVT.   - Continue to hold PTA meds  - Monitor    # ESRD on HD; Hx solitary kidney:  Secondary to diabetic nephropathy. HD initiated 5/2/19, currently TTS via RIJ tunneled catheter. No acute concerns.   - Nephrology consulted  - HD q TTS    - Daily weights  - Daily BMP, Mg, Phos    # Chronic anemia:  Likely anemia of chronic renal disease. Hgb stable.   - Repeat CBC in AM    # Hypothyroidism:  TSH 15 on admission, likely d/t medication non-compliance. Prior TSH 1.12 on current dose of levothyroxine, indicating adequate replacement when taking regularly.    - Continue levothyroxine 200mcg daily  - Repeat TFT's in 4-6 weeks    # Tobacco abuse:  Encouraged cessation.        Diet: Renal Diet (non-dialysis)    DVT Prophylaxis: IV heparin  Chaney Catheter: not present  Code Status: Full Code      Disposition Plan   Expected discharge: 2 - 3 days, recommended to prior living arrangement once diagnostics completed and symptoms improved.  Entered: Emery Hampton PA-C 05/25/2019, 2:12 PM       The patient's care was discussed with the Attending Physician, Dr. Porras.    Emery Hampton PA-C  Hospitalist Service, [address], [place]  Pager: [phone]  Please see sticky note for cross cover information  _____________________________________________________________________    Interval History Events of overnight reviewed with nursing staff. Persistent hypotension noted in LUE with SBP 70-80's. Intermittent runs of SVT, short duration, asymptomatic. Currently on room air. Patient reports feeling better.     Data reviewed today: I reviewed all medications, new labs and imaging results over the last 24 hours. I personally reviewed no images or EKG's today.    Physical Exam   Vital Signs: Temp: 98.5  F (36.9  C) Temp src: Oral BP: 123/77 Pulse: 81 Heart Rate: 89 Resp: 16 SpO2: 93 % O2 Device: None (Room air) Oxygen Delivery: 2 LPM  Weight: 139 lbs 4.8 oz    GENERAL:  Awake. Alert. Oriented x 3. NAD. RIJ tunneled catheter.   HEENT:  No scleral icterus. Mucous membranes moist.   CV:  RRR. S1, S2. IV systolic murmur. Diminished, delayed L radial pulse.  RESPIRATORY:  Diminished in bases. Lungs otherwise CTAB.   GI:  Abdomen soft,  non-distended. Active bowel sounds. No tenderness.    NEUROLOGICAL:  No focal deficits. Moves all extremities.    EXTREMITIES:  No peripheral edema. No calf tenderness. Intact bilateral pedal pulses.   SKIN:  No rash.      Data   ROUTINE IP LABS (Last four results)  Recent Labs   Lab 05/25/19  0801 05/24/19 0435 05/23/19 0445 05/22/19  0335 05/21/19  1639 05/21/19  1103 05/20/19  1942    136 140 139  --  143 143   POTASSIUM 4.3 4.0 3.8 3.8  --  4.1 3.9   CHLORIDE 106 104 108 106  --  107 106   CO2 27 28 28 30  --  31 33*   ANIONGAP 4 5 4 3  --  5 4   * 148* 76 109*  --  107* 94   BUN 20 13 17 10  --  20 16   CR 3.18* 2.12* 2.97* 2.24*  --  3.91* 3.61*   FRANCES 7.3* 7.2* 7.1* 7.6*  --  7.2* 7.1*   MAG  --   --   --  1.6 1.6  --   --    PHOS  --   --  3.2 1.9*  --  3.3  --    PROTTOTAL  --   --  4.8* 5.0*  --   --  5.8*   ALBUMIN  --   --  1.2* 1.2*  --  1.3* 1.4*   BILITOTAL  --   --  0.4 0.1*  --   --  0.3   ALKPHOS  --   --  100 102  --   --  125   AST  --   --  18 16  --   --  21   ALT  --   --  10 10  --   --  12     Recent Labs   Lab 05/25/19  0801 05/24/19 0435 05/23/19 0445 05/22/19  0335   WBC 9.4 6.3 8.2 7.6   RBC 3.31* 2.96* 2.84* 2.48*   HGB 9.4* 8.3* 8.0* 7.0*   HCT 31.8* 27.8* 26.8* 24.0*   MCV 96 94 94 97   MCH 28.4 28.0 28.2 28.2   MCHC 29.6* 29.9* 29.9* 29.2*   RDW 15.4* 14.9 15.2* 14.6    345 320 327     Recent Labs   Lab 05/22/19  0335   INR 0.99        Glucose Values Latest Ref Rng & Units 5/23/2019 5/23/2019 5/24/2019 5/24/2019 5/24/2019 5/24/2019 5/25/2019   Bedside Glucose (mg/dl )  - -- -- -- -- -- -- --   GLUCOSE 70 - 99 mg/dL 74 134(H) 175(H) 148(H) 111(H) 198(H) 165(H)   Some recent data might be hidden        All labs personally reviewed in Epic.  See A&P for additional results.     Unresulted Labs Ordered in the Past 30 Days of this Admission     Date and Time Order Name Status Description    5/22/2019 1202 AFB Culture Non Blood Preliminary

## 2019-05-25 NOTE — PROGRESS NOTES
Nephrology dialysis note    This patient was seen and examined while on dialysis. Laboratory results and nurses' notes were reviewed. Low BPs in left arm noted. IR plans intervention for occluded L subclavian stent.    No changes to management of volume, anemia, BMD, acidosis, or electrolytes.    Diagnosis - ESRD admitted with CP, plan for next HD Tuesday    Recommend:   - check BPs in R arm    Fredy Mccoy MD  [phone]

## 2019-05-25 NOTE — PROGRESS NOTES
Medicine cross cover note -    Primary team signed out to obtain CTA if US unrevealing.  D/w radiology and given her allergies she will need our twelve and two hour steroid prep.  Given methyl pred tonight and will receive a second dose in the am prior to 0930 CTA.

## 2019-05-25 NOTE — PLAN OF CARE
Neuro: A&Ox4. Forgetful at times.  Cardiac: SR/ST. HR has been 80-90, goes in and out of -150's episodes for about 30 seconds, max 's intermittently, pt asymptomatic, VSMD PRAVEEN aware. L and R arm BP varies.   Respiratory: Sating 93 % on RA.  GI/: HD pt, oliguric. No BM this shift.  Diet/appetite: Tolerating renal diet. Eating well.  Activity:  Assist of 1 up to chair.  Pain: At acceptable level on current regimen.   Skin: No new deficits noted.  LDA's: PIV to L and R; SL.    Plan: CTA of L arm completed. Pt had dialysis today, 500 ml out.  EP study on Tuesday. Continue with PO Cardizem per MD. Continue with POC. Notify primary team with changes.

## 2019-05-25 NOTE — PLAN OF CARE
Neuro: A&Ox4. Forgetful at times. No numbness/tingling.   Cardiac: SR with occ. PACs, HR 70s-90s, with intermittent afib RVR episodes with only ~10-30 seconds at a time, HR max in 130s. VSS. BPs varying greatly per arm, SBP 70s-90s L arm, SBP 130s-140s. C/o chest pain x1 which resolved spontaneously.   Respiratory: Sating 92% on RA, denies SOB.  GI/: oliguric, HD pt. No N/V. No BM.   Diet/appetite: Tolerating renal diet.   Activity:  Assist of 1 to commode.   Pain: Denies pain besides chest pain episode x1.   Skin: No new deficits noted.  LDA's:  Bilateral PIVs saline locked. R chest HD cath CDI.      Plan: Continue with POC. Notify primary team with changes.

## 2019-05-26 ENCOUNTER — APPOINTMENT (OUTPATIENT)
Dept: PHYSICAL THERAPY | Facility: CLINIC | Age: 74
DRG: 286 | End: 2019-05-26
Attending: PHYSICIAN ASSISTANT
Payer: COMMERCIAL

## 2019-05-26 ENCOUNTER — APPOINTMENT (OUTPATIENT)
Dept: OCCUPATIONAL THERAPY | Facility: CLINIC | Age: 74
DRG: 286 | End: 2019-05-26
Attending: PHYSICIAN ASSISTANT
Payer: COMMERCIAL

## 2019-05-26 LAB
ANION GAP SERPL CALCULATED.3IONS-SCNC: 4 MMOL/L (ref 3–14)
BUN SERPL-MCNC: 13 MG/DL (ref 7–30)
CALCIUM SERPL-MCNC: 7.7 MG/DL (ref 8.5–10.1)
CHLORIDE SERPL-SCNC: 104 MMOL/L (ref 94–109)
CO2 SERPL-SCNC: 28 MMOL/L (ref 20–32)
CREAT SERPL-MCNC: 2.23 MG/DL (ref 0.52–1.04)
ERYTHROCYTE [DISTWIDTH] IN BLOOD BY AUTOMATED COUNT: 15.9 % (ref 10–15)
GFR SERPL CREATININE-BSD FRML MDRD: 21 ML/MIN/{1.73_M2}
GLUCOSE BLDC GLUCOMTR-MCNC: 129 MG/DL (ref 70–99)
GLUCOSE SERPL-MCNC: 115 MG/DL (ref 70–99)
HCT VFR BLD AUTO: 32.6 % (ref 35–47)
HGB BLD-MCNC: 9.4 G/DL (ref 11.7–15.7)
MAGNESIUM SERPL-MCNC: 1.9 MG/DL (ref 1.6–2.3)
MCH RBC QN AUTO: 28.3 PG (ref 26.5–33)
MCHC RBC AUTO-ENTMCNC: 28.8 G/DL (ref 31.5–36.5)
MCV RBC AUTO: 98 FL (ref 78–100)
PHOSPHATE SERPL-MCNC: 1.3 MG/DL (ref 2.5–4.5)
PLATELET # BLD AUTO: 422 10E9/L (ref 150–450)
POTASSIUM SERPL-SCNC: 3.8 MMOL/L (ref 3.4–5.3)
RBC # BLD AUTO: 3.32 10E12/L (ref 3.8–5.2)
SODIUM SERPL-SCNC: 136 MMOL/L (ref 133–144)
WBC # BLD AUTO: 12.2 10E9/L (ref 4–11)

## 2019-05-26 PROCEDURE — 25800030 ZZH RX IP 258 OP 636: Performed by: PHYSICIAN ASSISTANT

## 2019-05-26 PROCEDURE — 25000125 ZZHC RX 250: Performed by: PHYSICIAN ASSISTANT

## 2019-05-26 PROCEDURE — 97530 THERAPEUTIC ACTIVITIES: CPT | Mod: GP

## 2019-05-26 PROCEDURE — 12000001 ZZH R&B MED SURG/OB UMMC

## 2019-05-26 PROCEDURE — 99207 ZZC APP CREDIT; MD BILLING SHARED VISIT: CPT | Performed by: PHYSICIAN ASSISTANT

## 2019-05-26 PROCEDURE — 99232 SBSQ HOSP IP/OBS MODERATE 35: CPT | Performed by: INTERNAL MEDICINE

## 2019-05-26 PROCEDURE — 97530 THERAPEUTIC ACTIVITIES: CPT | Mod: GO | Performed by: OCCUPATIONAL THERAPIST

## 2019-05-26 PROCEDURE — 00000146 ZZHCL STATISTIC GLUCOSE BY METER IP

## 2019-05-26 PROCEDURE — 97165 OT EVAL LOW COMPLEX 30 MIN: CPT | Mod: GO | Performed by: OCCUPATIONAL THERAPIST

## 2019-05-26 PROCEDURE — 80048 BASIC METABOLIC PNL TOTAL CA: CPT | Performed by: PHYSICIAN ASSISTANT

## 2019-05-26 PROCEDURE — 97535 SELF CARE MNGMENT TRAINING: CPT | Mod: GO | Performed by: OCCUPATIONAL THERAPIST

## 2019-05-26 PROCEDURE — 84100 ASSAY OF PHOSPHORUS: CPT | Performed by: PHYSICIAN ASSISTANT

## 2019-05-26 PROCEDURE — 83735 ASSAY OF MAGNESIUM: CPT | Performed by: PHYSICIAN ASSISTANT

## 2019-05-26 PROCEDURE — 84100 ASSAY OF PHOSPHORUS: CPT | Performed by: INTERNAL MEDICINE

## 2019-05-26 PROCEDURE — 36415 COLL VENOUS BLD VENIPUNCTURE: CPT | Performed by: INTERNAL MEDICINE

## 2019-05-26 PROCEDURE — 25000132 ZZH RX MED GY IP 250 OP 250 PS 637: Performed by: PHYSICIAN ASSISTANT

## 2019-05-26 PROCEDURE — 36415 COLL VENOUS BLD VENIPUNCTURE: CPT | Performed by: PHYSICIAN ASSISTANT

## 2019-05-26 PROCEDURE — 85027 COMPLETE CBC AUTOMATED: CPT | Performed by: PHYSICIAN ASSISTANT

## 2019-05-26 PROCEDURE — 97162 PT EVAL MOD COMPLEX 30 MIN: CPT | Mod: GP

## 2019-05-26 RX ORDER — DILTIAZEM HCL 60 MG
60 TABLET ORAL EVERY 6 HOURS SCHEDULED
Status: DISCONTINUED | OUTPATIENT
Start: 2019-05-26 | End: 2019-05-27

## 2019-05-26 RX ADMIN — MELATONIN 2000 UNITS: at 07:41

## 2019-05-26 RX ADMIN — LEVOTHYROXINE SODIUM 200 MCG: 100 TABLET ORAL at 07:41

## 2019-05-26 RX ADMIN — DOCUSATE SODIUM 100 MG: 100 CAPSULE, LIQUID FILLED ORAL at 20:40

## 2019-05-26 RX ADMIN — TIOTROPIUM BROMIDE 18 MCG: 18 CAPSULE ORAL; RESPIRATORY (INHALATION) at 09:39

## 2019-05-26 RX ADMIN — DILTIAZEM HYDROCHLORIDE 60 MG: 60 TABLET, FILM COATED ORAL at 18:54

## 2019-05-26 RX ADMIN — Medication: at 20:40

## 2019-05-26 RX ADMIN — SODIUM PHOSPHATE, MONOBASIC, MONOHYDRATE AND SODIUM PHOSPHATE, DIBASIC, ANHYDROUS 20 MMOL: 276; 142 INJECTION, SOLUTION INTRAVENOUS at 18:55

## 2019-05-26 RX ADMIN — DILTIAZEM HYDROCHLORIDE 60 MG: 60 TABLET, FILM COATED ORAL at 06:10

## 2019-05-26 RX ADMIN — SODIUM BICARBONATE 650 MG TABLET 650 MG: at 20:40

## 2019-05-26 RX ADMIN — ATORVASTATIN CALCIUM 40 MG: 40 TABLET, FILM COATED ORAL at 07:42

## 2019-05-26 RX ADMIN — FLUTICASONE PROPIONATE 1 SPRAY: 50 SPRAY, METERED NASAL at 07:40

## 2019-05-26 RX ADMIN — DOCUSATE SODIUM 100 MG: 100 CAPSULE, LIQUID FILLED ORAL at 07:40

## 2019-05-26 RX ADMIN — Medication 2 G: at 13:05

## 2019-05-26 RX ADMIN — SENNOSIDES AND DOCUSATE SODIUM 1 TABLET: 8.6; 5 TABLET ORAL at 07:41

## 2019-05-26 RX ADMIN — SENNOSIDES AND DOCUSATE SODIUM 2 TABLET: 8.6; 5 TABLET ORAL at 20:40

## 2019-05-26 RX ADMIN — FLUTICASONE FUROATE AND VILANTEROL TRIFENATATE 1 PUFF: 100; 25 POWDER RESPIRATORY (INHALATION) at 07:41

## 2019-05-26 RX ADMIN — ASPIRIN 81 MG: 81 TABLET, COATED ORAL at 07:42

## 2019-05-26 RX ADMIN — Medication: at 07:42

## 2019-05-26 RX ADMIN — DILTIAZEM HYDROCHLORIDE 60 MG: 60 TABLET, FILM COATED ORAL at 11:48

## 2019-05-26 RX ADMIN — SODIUM BICARBONATE 650 MG TABLET 650 MG: at 07:40

## 2019-05-26 ASSESSMENT — ACTIVITIES OF DAILY LIVING (ADL)
ADLS_ACUITY_SCORE: 14
ADLS_ACUITY_SCORE: 14
ADLS_ACUITY_SCORE: 18
ADLS_ACUITY_SCORE: 18
ADLS_ACUITY_SCORE: 14
ADLS_ACUITY_SCORE: 18

## 2019-05-26 NOTE — PROGRESS NOTES
[facility], [place]    Medicine Progress Note - Hospitalist Service, Gold 7       Date of Admission:  5/20/2019  Assessment & Plan Kim Anne is a 73 year old female admitted on 5/20/2019. 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 She is admitted for evaluation of exertional chest pain and dyspnea.     # Exertional chest pain and dyspnea:  Initial concern for unstable angina given slightly elevated, but stable, troponin. Echo 5/21 showed normal LV function (EF 60-65%), no wall motion abnormalities, normal RV function, and elevated PA pressures c/w pulmonary hypertension. Cardiology consulted, s/p coronary angio 5/23 which showed non-obstructive CAD. No intervention needed. SVT noted during heart cath with associated hypotension. Cardiology suspects presenting symptoms d/t SVT. Clinically improved.   - EP consulted, see below   - Continue ASA 81mg daily per cards recs  - Consider repeat EKG for recurrent symptoms    # Symptomatic paroxysmal SVT and non-sustained VT:  See above. Noted to have frequent runs of SVT during heart cath 5/23 with associated hypotension. Cardiology suspect presenting symptoms d/t arrhythmia. Continues to have brief paroxysms SVT which resolved spontaneously. BP stable. Minimally symptomatic with episodes at this time. Mg 1.3, replaced 5/26.   - Repeat Mg, replace as needed  - EP consulted, may need EP study if persists despite diltiazem (? Tuesday)  - Increase Cardizem to 60mg q 6h with hold parameters (SBP <110 or HR <55)  - Transition to long acting Cardizem 240mg daily in coming days if BP tolerates  - OK to transfer off 6B to med/tele    # Acute hypoxic respiratory failure:  Etiology multifactorial, with underlying COPD, SVT, and bilateral pleural effusions contributing. Symptoms improved with  better HR control. Pleural effusions stable but currently on room air. Lungs clear on exam. Sputum culture 5/21 grew Hemophilus influenzae and Moraxella catarrhalis but no clinical signs/symptoms of active pneumonia, therefore suspect colonization. Continues to do well on RA. No desaturations with PT/OT today.   - O2 prn to maintain sats >90%.   - Monitor for fevers, worsening cough or increased sputum production  - Consider therapeutic thoracentesis if develops worsening symptoms    # Occluded L subclavian stent:  Asymmetric BP noted 5/24 with SBP 70's on left and 120's on right. Delayed and diminished L radial pulse also noted on exam. LUE arterial US negative for focal stenosis or occlusion. CTA 5/25 negative for dissection but shows occlusion of previously placed L subclavian stent. Case discussed with IR - appears to have good collaterals. Currently no ischemic symptoms, therefore no indication for inpatient intervention.   - Monitor for symptoms of ischemia  - Check blood pressures on R arm for most accurate reading  - Will need OP follow up with IR for definitive treatment with angioplasty    # COPD:  No wheezing on exam. Cont breo ellipta, spiriva, and duoneb QID.     # Hx LTBI with positive quantiferon gold:  Reportedly treated in 1970's. No history of new exposures. CXR negative for evidence of active TB. ID consulted. No indication for further evaluation or airborne isolation d/t positive quantiferon gold alone. Single sputum sent for AFB stain/culture, currently pending.   - Follow up on pending AFB cultures, currently NGTD    # Type II DM:  Diet controlled. A1C 5.2 on 5/1/19. Adequately controlled on diet alone.   - Glucose checks AC/HS  - Hypoglycemia protocol ordered     # HTN:  PTA amlodipine and lasix held d/t low BP secondary to SVT. Unclear if low BP's measured on LUE, which has known arterial occlusion. BP now improved. Hypertensive last evening but now improved.   - Continue diltiazem as  above  - Please check BP on right arm only    # ESRD on HD; Hx solitary kidney:  Secondary to diabetic nephropathy. HD initiated 5/2/19, currently TTS via RIJ tunneled catheter. No acute concerns.   - Nephrology consulted  - HD q TTS   - Daily weights  - Daily BMP, Mg, Phos    # Chronic anemia:  Likely anemia of chronic renal disease. Hgb stable.   - Repeat CBC in AM    # Hypothyroidism:  TSH 15 on admission, likely d/t medication non-compliance. Prior TSH 1.12 on current dose of levothyroxine, indicating adequate replacement when taking regularly.    - Continue levothyroxine 200mcg daily  - Repeat TFT's in 4-6 weeks    # Tobacco abuse:  Encouraged cessation.        Diet: Renal Diet (non-dialysis)    DVT Prophylaxis: IV heparin  Chaney Catheter: not present  Code Status: Full Code      Disposition Plan   Expected discharge: 2 - 3 days, recommended to TBD once safe disposition plan/ TCU bed available and vitals stable; diagnostics completed.  Entered: Emery Hampton PA-C 05/26/2019, 10:28 AM       The patient's care was discussed with the Attending Physician, Dr. Porras.    Emery Hampton PA-C  Hospitalist Service, [address], [place]  Pager: [phone]  Please see sticky note for cross cover information  _____________________________________________________________________    Interval History Feeling better today. Denies any chest pain, dyspnea, palpitations or dizziness. Got very fatigued when up walking with OT, unclear if tachycardic w activity. Denies any pain or paresthesias in LUE.     ROS:  Pulm, CV, GI and  performed and negative unless otherwise noted above.     Data reviewed today: I reviewed all medications, new labs and imaging results over the last 24 hours. I personally reviewed no images or EKG's today.    Physical Exam   Vital Signs: Temp: 99.3  F (37.4  C) Temp src: Oral BP: 141/73   Heart Rate: 73 Resp: 16 SpO2: 98 % O2 Device: None (Room air)    Weight: 142 lbs  14.4 oz    GENERAL:  Awake. Alert. Oriented x 3. NAD. RIJ tunneled catheter.   HEENT:  No scleral icterus. Mucous membranes moist.   CV:  RRR. S1, S2. IV systolic murmur. Diminished, delayed L radial pulse.  RESPIRATORY:  Diminished in bases. Lungs otherwise CTAB.   GI:  Abdomen soft, non-distended. Active bowel sounds. No tenderness.    NEUROLOGICAL:  No focal deficits. Moves all extremities.    EXTREMITIES:  No peripheral edema. No calf tenderness. Intact bilateral pedal pulses.   SKIN:  No rash.      Data   ROUTINE IP LABS (Last four results)  Recent Labs   Lab 05/25/19  1840 05/25/19  0801 05/24/19  0435 05/23/19  0445 05/22/19  0335 05/21/19  1639 05/21/19  1103 05/20/19  1942   NA  --  138 136 140 139  --  143 143   POTASSIUM  --  4.3 4.0 3.8 3.8  --  4.1 3.9   CHLORIDE  --  106 104 108 106  --  107 106   CO2  --  27 28 28 30  --  31 33*   ANIONGAP  --  4 5 4 3  --  5 4   GLC  --  165* 148* 76 109*  --  107* 94   BUN  --  20 13 17 10  --  20 16   CR  --  3.18* 2.12* 2.97* 2.24*  --  3.91* 3.61*   FRANCES  --  7.3* 7.2* 7.1* 7.6*  --  7.2* 7.1*   MAG 1.3*  --   --   --  1.6 1.6  --   --    PHOS  --   --   --  3.2 1.9*  --  3.3  --    PROTTOTAL  --   --   --  4.8* 5.0*  --   --  5.8*   ALBUMIN  --   --   --  1.2* 1.2*  --  1.3* 1.4*   BILITOTAL  --   --   --  0.4 0.1*  --   --  0.3   ALKPHOS  --   --   --  100 102  --   --  125   AST  --   --   --  18 16  --   --  21   ALT  --   --   --  10 10  --   --  12     Recent Labs   Lab 05/25/19  0801 05/24/19  0435 05/23/19  0445 05/22/19  0335   WBC 9.4 6.3 8.2 7.6   RBC 3.31* 2.96* 2.84* 2.48*   HGB 9.4* 8.3* 8.0* 7.0*   HCT 31.8* 27.8* 26.8* 24.0*   MCV 96 94 94 97   MCH 28.4 28.0 28.2 28.2   MCHC 29.6* 29.9* 29.9* 29.2*   RDW 15.4* 14.9 15.2* 14.6    345 320 327     Recent Labs   Lab 05/22/19  0335   INR 0.99        Glucose Values Latest Ref Rng & Units 5/24/2019 5/24/2019 5/24/2019 5/24/2019 5/25/2019 5/25/2019 5/25/2019   Bedside Glucose (mg/dl )  - -- --  -- -- -- -- 212   GLUCOSE 70 - 99 mg/dL 175(H) 148(H) 111(H) 198(H) 165(H) 212(H) --   Some recent data might be hidden        All labs personally reviewed in Epic.  See A&P for additional results.     Unresulted Labs Ordered in the Past 30 Days of this Admission     Date and Time Order Name Status Description    5/26/2019 0743 Phosphorus In process     5/26/2019 0743 Magnesium In process     5/22/2019 1202 AFB Culture Non Blood Preliminary

## 2019-05-26 NOTE — PROGRESS NOTES
05/26/19 1200   Quick Adds   Type of Visit Initial Occupational Therapy Evaluation   Living Environment   Lives With grandchild(dari);child(dari), adult   Living Arrangements house   Number of Stairs, Main Entrance 5   Transportation Anticipated family or friend will provide   Living Environment Comment Family is often home but she is alone for parts of the day.  Prior notes state pt drives, per pt today she has various options but family often give her a ride.     Self-Care   Usual Activity Tolerance fair   Current Activity Tolerance fair   Equipment Currently Used at Home walker, rolling;cane, straight   Activity/Exercise/Self-Care Comment Pt reports being sedentary at home.  Does go to dialysis and that is the most exercise she gets.     Functional Level   Ambulation 1-->assistive equipment   Transferring 1-->assistive equipment   Toileting 0-->independent   Bathing 0-->independent   Dressing 0-->independent   Cognition 0 - no cognition issues reported   Fall history within last six months yes   Number of times patient has fallen within last six months 2   Which of the above functional risks had a recent onset or change? ambulation;transferring;fall history  (poor activity tolerance for ADLs in standing)   Prior Functional Level Comment Pt reports feeling weak, denies cognitive deficits although prior ntoes from last admit noted cognitive decline.    General Information   Onset of Illness/Injury or Date of Surgery - Date 05/20/19   Referring Physician Berta SELLERS   Patient/Family Goals Statement dc home   Additional Occupational Profile Info/Pertinent History of Current Problem 73 year old female admitted on 5/1/2019. She has PMH of DMII, ESRD, HLD, COPD, Hypothyroidism, Tobacco abuse who presents to the ED from Nephrology clinic for evaluation of Hyperkalemia   Precautions/Limitations no known precautions/limitations   General Observations Pt pleasant and agreeable.    General Info Comments activity up w A    Cognitive Status Examination   Orientation orientation to person, place and time   Level of Consciousness alert   Memory impaired   Cognitive Comment Pt slow to answer, reports she is at her baseline. Not formally assessed.    Visual Perception   Visual Perception Wears glasses   Sensory Examination   Sensory Quick Adds No deficits were identified   Integumentary/Edema   Integumentary/Edema no deficits were identifed   Posture   Posture Comments hunched posture, more pronounced with fatigue   Range of Motion (ROM)   ROM Comment WFL   Strength   Strength Comments generalized weakness, feels she is below her baseline due to hospital deconditioning   Hand Strength   Hand Strength Comments intact, functional   Coordination   Fine Motor Coordination WFL during ADLs   Mobility   Bed Mobility Comments IND, assist with blankets, HOB elevated   Transfer Skills   Transfer Comments Close CGA using walker   Transfer Skill: Bed to Chair/Chair to Bed   Level of [place]: Bed to Chair contact guard   Assistive Device - Transfer Skill Bed to Chair Chair to Bed Rehab Eval rolling walker   Transfer Skill: Sit to Stand   Level of [place]: Sit/Stand minimum assist (75% patients effort)  (from lower seat)   Physical Assist/Nonphysical Assist: Sit/Stand 1 person assist   Toilet Transfer   Toilet Transfer Comments using BSC with nursing   Tub/Shower Transfer   Tub/Shower Transfer Comments reports she has been sponge bathing since getting ill recently   Balance   Balance Comments below baseline, no overt LOB although slightly unsteady with walker   Bathing   Level of [place] stand-by assist  (seated sponge bath)   Upper Body Dressing   Level of [place]: Dress Upper Body stand-by assist   Lower Body Dressing   Level of [place]: Dress Lower Body independent   Grooming   Level of [place]: Grooming stand-by assist   Physical Assist/Nonphysical Assist: Grooming set-up required   Instrumental Activities of Daily  Living (IADL)   Previous Responsibilities   (does some, family assist with all, no longer does laundry )   Activities of Daily Living Analysis   Impairments Contributing to Impaired Activities of Daily Living strength decreased;balance impaired  (deconditioned)   General Therapy Interventions   Planned Therapy Interventions ADL retraining;progressive activity/exercise;home program guidelines;strengthening   Clinical Impression   Criteria for Skilled Therapeutic Interventions Met yes, treatment indicated   OT Diagnosis deconditioning   Influenced by the following impairments decreased strength and activity tolerance, risk of falls   Assessment of Occupational Performance 1-3 Performance Deficits   Identified Performance Deficits home management, shower toileting   Clinical Decision Making (Complexity) Low complexity   Therapy Frequency daily   Predicted Duration of Therapy Intervention (days/wks) 1 week   Anticipated Discharge Disposition Transitional Care Facility;Home with Assist;Home with Home Therapy   Risks and Benefits of Treatment have been explained. Yes   Patient, Family & other staff in agreement with plan of care Yes   Clinical Impression Comments Pt would benefit from TCU due to recent falls and current deconditioning. Pt reports she would decline TCU and wants to dc to home. Would benefit from IP therapies to educate re fall rpevention, and improve strength adn activity tolerance for safe dc home.    Total Evaluation Time   Total Evaluation Time (Minutes) 4

## 2019-05-26 NOTE — PLAN OF CARE
Neuro: A&Ox4. Forgetful at times. No numbness/tingling.   Cardiac: SR with occ. PACs, HR 70s-90s, with intermittent SVT episodes with only ~10-30 seconds at a time, HR max in 140s.  SBP 160s-180s.   Respiratory: Sating 92% on RA, denies SOB.  GI/: oliguric, HD pt. No N/V. No BM.   Diet/appetite: Tolerating renal diet.   Activity:  Assist of 1 to commode.   Pain: Denies pain .  Skin: No new deficits noted.  LDA's: PIV saline locked. R chest HD cath CDI.      Plan: Continue with POC. Notify primary team with changes.

## 2019-05-26 NOTE — PLAN OF CARE
D: Patient transferred from 6B earlier with all belongings. SHe is alert and oriented X3. Has not gotten out of bed yet. Sleeping comfortably, IV saline locked and tele hooked up. Needs phosphorus replacement when it arrives from pharmacy.

## 2019-05-26 NOTE — PROGRESS NOTES
Transfer  Transferred to: 5B  Via:bed  Reason for transfer:Pt no longer appropriate for 6B- improved patient condition  Family: Unable to reach.  Belongings: Packed and sent with pt  Chart: Delivered with pt to next unit  Medications: Meds sent to new unit with pt  Report given to: Dee  Pt status: pt is alert and oriented x4. On RA. Mg 1.9; replacement infusing. Phos 1.3; PA notified.

## 2019-05-26 NOTE — PROVIDER NOTIFICATION
2130: Gold cross-cover notified that tele printed strip from 1900 where pt had ~15 beats of SVT/possible Vtach run, then had 20 second period with 1-2 normal beats, then these SVT/Vtach appearing beats for 3-5 beats. SR now. SBP now 169. NP stated that no changes or concerns unless patient sustaining.     0007: Gold cross-cover notified that pt SBP still in 160s, asymptomatic. Improved from 180s after Cardizem. Inquired if team wanted to order PRN BP meds. No new orders at this time.

## 2019-05-26 NOTE — PLAN OF CARE
PT 6B: Evaluation completed and treatment initiated.   Discharge Planner PT   Patient plan for discharge: Return home  Current status: SOB with gait x100ft, HR increases by [id number] with this light activity.  Proximal LE weakness noted.  Recent falls 2/2 hypotension after dialysis.  Barriers to return to prior living situation: medical status  Recommendations for discharge: Home with HHPT  Rationale for recommendations:  Pt has notable proximal LE weakness and history of falls, also with low activity baseline.  Is at risk for readmission as well as further falls.  Will benefit from skilled PT to increase gait safety and LE strength.  HHPT to continue with LE strengthening at discharge         Entered by: Laura Plummer 05/26/2019 12:33 PM

## 2019-05-26 NOTE — PLAN OF CARE
Discharge Planner OT  6B  Patient plan for discharge: Pt initially reports she would consider TCU, talking to her family.  At end of session stated she would plan to go home from hospital.   Current status: Pt currently below her baseline for strength and activity tolerance. Reports 2 falls after dialysis and overall weakness.  Pt with poor awareness of limitations, putting self at risk of falls.  Pt reports being sedentary at home and has assist from family for IADLs.    Barriers to return to prior living situation: medical needs, fall risk  Recommendations for discharge: TCU although per pt will decline and plans to dc to home.  If dc to home rec home with home OT PT     Rationale for recommendations: Continue skilled therapies to improve strength and overall activity tolerance along with ADL AE to reduce risk of future falls.        Entered by: Martha Albarran 05/26/2019 12:22 PM

## 2019-05-26 NOTE — PROGRESS NOTES
05/26/19 1155   Quick Adds   Type of Visit Initial PT Evaluation   Living Environment   Lives With child(dari), adult;child(dari), dependent   Living Arrangements house   Home Accessibility stairs to enter home   Number of Stairs, Main Entrance 5   Transportation Anticipated car, drives self   Self-Care   Usual Activity Tolerance fair   Current Activity Tolerance poor   Regular Exercise No   Equipment Currently Used at Home   (4WW)   Activity/Exercise/Self-Care Comment Pt is sedentary at home.  OP Dialysis 3x/wk   Functional Level Prior   Ambulation 1-->assistive equipment   Transferring 1-->assistive equipment   Toileting 0-->independent   Bathing 0-->independent   Communication 0-->understands/communicates without difficulty   Swallowing 0-->swallows foods/liquids without difficulty   Cognition 0 - no cognition issues reported   Fall history within last six months yes   Number of times patient has fallen within last six months 2   Prior Functional Level Comment Pt fell after dialysis when hypotensive.   General Information   Onset of Illness/Injury or Date of Surgery - Date 05/20/19   Referring Physician Emery Hampton PA-C   Patient/Family Goals Statement none stated   Pertinent History of Current Problem (include personal factors and/or comorbidities that impact the POC) Kim Anne is a 73 year old female admitted on 5/20/2019. 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 She is admitted for evaluation of exertional chest pain and dyspnea.    Precautions/Limitations fall precautions   Heart Disease Risk Factors Lack of physical activity;Medical history;Overweight;Age   General Observations HR 70's at rest up to 100's with short gait bout of x100ft, irregular    Cognitive Status Examination   Orientation orientation to person, place and time    Level of Consciousness alert   Follows Commands and Answers Questions 100% of the time   Personal Safety and Judgment intact   Cognitive Comment question pt's cognition, however also appears hard of hearing which may be impeding comprehension of some commands.   Pain Assessment   Patient Currently in Pain No   Integumentary/Edema   Integumentary/Edema no deficits were identifed   Posture    Posture Forward head position;Protracted shoulders   Range of Motion (ROM)   ROM Quick Adds No deficits were identified   Strength   Strength Comments unable to rise from chair without notable BUE support indicating proximal LE weakness   Bed Mobility   Bed Mobility Comments not observed   Transfer Skills   Transfer Comments mod (I) sit<>stand but requires increased time to complete bracing with posterior LEs against chair and hinging at hips into extension   Gait   Gait Comments uses FWW.  forward flexed at hips throughout, fatigues quickly and with that flexes forward even more and feet are outside the FWW FELICIANO   Balance   Balance Comments intact sitting, impaired dynamic reaching   General Therapy Interventions   Planned Therapy Interventions balance training;gait training;strengthening;home program guidelines;progressive activity/exercise;transfer training   Clinical Impression   Criteria for Skilled Therapeutic Intervention yes, treatment indicated   PT Diagnosis impaired functional mobility   Influenced by the following impairments strength, balance, cardiopulm conditioning   Functional limitations due to impairments endurance for community gait and household ADL, falls risk   Clinical Presentation Evolving/Changing   Clinical Presentation Rationale SOB and abnormal HR response with activity   Clinical Decision Making (Complexity) Moderate complexity   Therapy Frequency` 3 times/week   Predicted Duration of Therapy Intervention (days/wks) 1 week   Anticipated Discharge Disposition Home with Home Therapy   Risk & Benefits  "of therapy have been explained Yes   Patient, Family & other staff in agreement with plan of care Yes   Clinical Impression Comments Pt has notable proximal LE weakness and history of falls, also with low activity baseline.  Is at risk for readmission as well as further falls.  Will benefit from skilled PT to increase gait safety and LE strength.  HHPT to continue with LE strengthening at discharge   [facility] AM-PAC  \"6 Clicks\" V.2 Basic Mobility Inpatient Short Form   1. Turning from your back to your side while in a flat bed without using bedrails? 4 - None   2. Moving from lying on your back to sitting on the side of a flat bed without using bedrails? 4 - None   3. Moving to and from a bed to a chair (including a wheelchair)? 4 - None   4. Standing up from a chair using your arms (e.g., wheelchair, or bedside chair)? 4 - None   5. To walk in hospital room? 4 - None   6. Climbing 3-5 steps with a railing? 3 - A Little   Basic Mobility Raw Score (Score out of 24.Lower scores equate to lower levels of function) 23   Total Evaluation Time   Total Evaluation Time (Minutes) 6     "

## 2019-05-27 ENCOUNTER — APPOINTMENT (OUTPATIENT)
Dept: OCCUPATIONAL THERAPY | Facility: CLINIC | Age: 74
DRG: 286 | End: 2019-05-27
Payer: COMMERCIAL

## 2019-05-27 LAB
GLUCOSE BLDC GLUCOMTR-MCNC: 146 MG/DL (ref 70–99)
GLUCOSE BLDC GLUCOMTR-MCNC: 93 MG/DL (ref 70–99)
PHOSPHATE SERPL-MCNC: 2.5 MG/DL (ref 2.5–4.5)

## 2019-05-27 PROCEDURE — 25000132 ZZH RX MED GY IP 250 OP 250 PS 637: Performed by: PHYSICIAN ASSISTANT

## 2019-05-27 PROCEDURE — 99232 SBSQ HOSP IP/OBS MODERATE 35: CPT | Performed by: INTERNAL MEDICINE

## 2019-05-27 PROCEDURE — 12000001 ZZH R&B MED SURG/OB UMMC

## 2019-05-27 PROCEDURE — 00000146 ZZHCL STATISTIC GLUCOSE BY METER IP

## 2019-05-27 PROCEDURE — 97535 SELF CARE MNGMENT TRAINING: CPT | Mod: GO

## 2019-05-27 PROCEDURE — 99207 ZZC APP CREDIT; MD BILLING SHARED VISIT: CPT | Performed by: PHYSICIAN ASSISTANT

## 2019-05-27 PROCEDURE — 97110 THERAPEUTIC EXERCISES: CPT | Mod: GO

## 2019-05-27 PROCEDURE — 97530 THERAPEUTIC ACTIVITIES: CPT | Mod: GO

## 2019-05-27 RX ORDER — DILTIAZEM HYDROCHLORIDE 240 MG/1
240 CAPSULE, COATED, EXTENDED RELEASE ORAL DAILY
Status: DISCONTINUED | OUTPATIENT
Start: 2019-05-28 | End: 2019-05-30 | Stop reason: HOSPADM

## 2019-05-27 RX ORDER — DILTIAZEM HCL 60 MG
60 TABLET ORAL EVERY 6 HOURS SCHEDULED
Status: COMPLETED | OUTPATIENT
Start: 2019-05-27 | End: 2019-05-28

## 2019-05-27 RX ADMIN — DOCUSATE SODIUM 100 MG: 100 CAPSULE, LIQUID FILLED ORAL at 09:02

## 2019-05-27 RX ADMIN — LEVOTHYROXINE SODIUM 200 MCG: 100 TABLET ORAL at 09:01

## 2019-05-27 RX ADMIN — ATORVASTATIN CALCIUM 40 MG: 40 TABLET, FILM COATED ORAL at 09:16

## 2019-05-27 RX ADMIN — FLUTICASONE FUROATE AND VILANTEROL TRIFENATATE 1 PUFF: 100; 25 POWDER RESPIRATORY (INHALATION) at 09:01

## 2019-05-27 RX ADMIN — FUROSEMIDE 60 MG: 40 TABLET ORAL at 17:50

## 2019-05-27 RX ADMIN — FLUTICASONE PROPIONATE 1 SPRAY: 50 SPRAY, METERED NASAL at 09:00

## 2019-05-27 RX ADMIN — SENNOSIDES AND DOCUSATE SODIUM 2 TABLET: 8.6; 5 TABLET ORAL at 19:57

## 2019-05-27 RX ADMIN — MELATONIN 2000 UNITS: at 09:02

## 2019-05-27 RX ADMIN — DOCUSATE SODIUM 100 MG: 100 CAPSULE, LIQUID FILLED ORAL at 19:57

## 2019-05-27 RX ADMIN — Medication: at 09:08

## 2019-05-27 RX ADMIN — POLYETHYLENE GLYCOL 3350 17 G: 17 POWDER, FOR SOLUTION ORAL at 13:40

## 2019-05-27 RX ADMIN — SODIUM BICARBONATE 650 MG TABLET 650 MG: at 19:58

## 2019-05-27 RX ADMIN — SENNOSIDES AND DOCUSATE SODIUM 2 TABLET: 8.6; 5 TABLET ORAL at 09:02

## 2019-05-27 RX ADMIN — DILTIAZEM HYDROCHLORIDE 60 MG: 60 TABLET, FILM COATED ORAL at 06:13

## 2019-05-27 RX ADMIN — BISACODYL 10 MG: 5 TABLET, COATED ORAL at 13:40

## 2019-05-27 RX ADMIN — ASPIRIN 81 MG: 81 TABLET, COATED ORAL at 09:02

## 2019-05-27 RX ADMIN — SODIUM BICARBONATE 650 MG TABLET 650 MG: at 09:01

## 2019-05-27 RX ADMIN — DILTIAZEM HYDROCHLORIDE 60 MG: 60 TABLET, FILM COATED ORAL at 13:08

## 2019-05-27 RX ADMIN — Medication: at 19:58

## 2019-05-27 RX ADMIN — DILTIAZEM HYDROCHLORIDE 60 MG: 60 TABLET, FILM COATED ORAL at 19:57

## 2019-05-27 RX ADMIN — TIOTROPIUM BROMIDE 18 MCG: 18 CAPSULE ORAL; RESPIRATORY (INHALATION) at 09:00

## 2019-05-27 ASSESSMENT — ACTIVITIES OF DAILY LIVING (ADL)
ADLS_ACUITY_SCORE: 14

## 2019-05-27 NOTE — PLAN OF CARE
OT 5B  Discharge Planner OT   Patient plan for discharge: Pt undecided on whether or not she wants to go home or TCU  Current status: SBA supine<>EOB with HOB flat. SBA sit<>stand from higher surface with FWW. Min A for a toilet transfer with use of grab bar and FWW. SBA doffing/donning new socks. Pt ambulated ~125ft with FWW and CGA.   Barriers to return to prior living situation: fatigue, weakness, acute medical needs  Recommendations for discharge: TCU, however if pt declines would recommend home with assist and HH OT/PT  Rationale for recommendations: Pt is below baseline and would benefit from continued skilled therapy to increase activity tolerance and independence with ADLs       Entered by: Olivia Fitch 05/27/2019 2:43 PM

## 2019-05-27 NOTE — PLAN OF CARE
Shift: [id number]  VS: Temp: 98.4  F (36.9  C) Temp src: Oral BP: 117/57 Pulse: 67 Heart Rate: 68 Resp: 16 SpO2: 95 % O2 Device: None (Room air)    Pain: No c/o of pain  Neuro: A&O, neuros intact ex baseline LE neuropathy, [place]  Cardiac: Sinus Rhythm, wnl   Respiratory: LS coarse, no SOB at rest  GI/Diet/Appetite: Renal diet, good appetite, no BM this shift  : Oliguric on HD    LDA's: PIV running phos replacement   Skin: Intact, dry, ecchymotic  Activity: A1 with walker   Pertinent Labs/Lab Collection: , Phos 1.3, replaced, recheck ordered     Plan:  Continue to monitor and with POC.

## 2019-05-27 NOTE — PROGRESS NOTES
[facility], [place]    Medicine Progress Note - Hospitalist Service, Gold 7       Date of Admission:  5/20/2019  Assessment & Plan   Kim Anne is a 73 year old female admitted on 5/20/2019. 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 She is admitted for evaluation of exertional chest pain and dyspnea.      # Exertional chest pain and dyspnea:  Initial concern for unstable angina given slightly elevated, but stable, troponin. Echo 5/21 showed normal LV function (EF 60-65%), no wall motion abnormalities, normal RV function, and elevated PA pressures c/w pulmonary hypertension. Cardiology consulted, s/p coronary angio 5/23 which showed non-obstructive CAD. No intervention needed. SVT noted during heart cath with associated symptom of chest tightness and hypotension. Patient reported to have SVT/tachycarida previously during HD runs. Cardiology suspects presenting symptoms d/t SVT. Clinically improved, however continues to have intermittent episodes of chest tightness.  - EP consulted, see below   - Continue ASA 81mg daily per cards recs  - Consider repeat EKG for recurrent symptoms     # Symptomatic paroxysmal SVT and non-sustained VT:  See above. Noted to have frequent runs of SVT during heart cath 5/23 with associated chest tightness and hypotension. Cardiology suspect presenting symptoms d/t arrhythmia. Continues to have brief paroxysms SVT which resolved spontaneously. BP stable. Minimally symptomatic with episodes at this time. Mg 1.3 5/26, replaced, repeat 2.5.  - Repeat Mag in AM   - Transition short acting Cardizem 60mg q 6h to Long acting Cardizem 240 mg daily in the AM with hold parameters (SBP <110 or HR  <55)   - EP consulted, may need EP study if persists despite diltiazem possibly tomorrow  - NPO tomorrow AM for possible procedure, will discuss with EP in AM   - Continue telemetry      # Acute hypoxic respiratory failure:  Etiology multifactorial, with SVT, and bilateral pleural effusions contributing with poor reserve 2/2 COPD. Symptoms improved with better HR control. Pleural effusions stable, currently on room air. Lungs clear on exam. Sputum culture 5/21 grew Hemophilus influenzae and Moraxella catarrhalis but no clinical signs/symptoms of active pneumonia, therefore suspect colonization. Continues to do well on RA. No desaturations with PT/OT today. WBC mildly elevated 5/26 continues to have ongoing/chronic cough.   - O2 prn to maintain sats >90%.   - Monitor for fevers, worsening cough or increased sputum production  - Consider therapeutic thoracentesis if develops worsening symptoms  - Repeat CBC in AM     #Leukocytosis: WBC 12.2 5/26. Unclear etiology at this time. Patient has persistent chronic cough with no change in sputum production. Sputum cultures 5/21 grew Hemophilus influenzae and Moraxella catarrhalis but no acute signs of pneumonia. CTA chest 5/25 with scattered groundglass opacities in lung possibly representing infectious process. No urinary symptoms. Afebrile and hemodynamically stable.   - Repeat CBC in AM   - If WBC continues to trend up or becomes febrile would initiate broad spectrum abx and obtain blood cultures      # Occluded L subclavian stent:  Asymmetric BP noted 5/24 with SBP 70's on left and 120's on right. Delayed and diminished L radial pulse also noted on exam. LUE arterial US negative for focal stenosis or occlusion. CTA 5/25 negative for dissection but shows occlusion of previously placed L subclavian stent. Case discussed with IR - appears to have good collaterals. Currently no ischemic symptoms, therefore no indication for inpatient intervention.   - Monitor for symptoms of  ischemia  - Check blood pressures on R arm for most accurate reading  - Will need OP follow up with IR for definitive treatment with angioplasty    # HTN:  PTA amlodipine, metoprolol and lasix held d/t low BP secondary to SVT. Unclear if low BP's measured on LUE, which has known arterial occlusion. BP now improved. Mild hypertension today.   - Continue diltiazem as above   - Resume PTA lasix 60 mg BID today   - Please check BP on right arm only     # ESRD on HD; Hx solitary kidney:  Secondary to diabetic nephropathy (biopsy proven). HD initiated 5/2/19, currently TTS via RIJ tunneled catheter. No acute concerns.   - Nephrology consulted  - HD q TTS   - Daily weights  - Daily BMP, Mg, Phos    # Hypothyroidism:  TSH 15 on admission, likely d/t medication non-compliance. Prior TSH 1.12 on current dose of levothyroxine, indicating adequate replacement when taking regularly.    - Continue levothyroxine 200mcg daily  - Repeat TFT's in 4-6 weeks    # Type II DM:  Diet controlled. A1C 5.2 on 5/1/19. Adequately controlled on diet alone.   - Glucose checks AC/HS   - Hypoglycemia protocol ordered     Stable chronic problems:    # COPD:  No wheezing on exam to suggest acute exacerbation. Cont breo ellipta, spiriva, and duoneb QID.   # Hx LTBI with positive quantiferon gold:  Reportedly treated in 1970's. No history of new exposures. CXR negative for evidence of active TB. ID consulted. No indication for further evaluation or airborne isolation d/t positive quantiferon gold alone. Single sputum sent for AFB stain/culture negative.    # Chronic anemia:  Likely anemia of chronic renal disease. Hgb stable.   # Tobacco abuse:  Encouraged cessation.       Diet: Renal Diet (non-dialysis)    DVT Prophylaxis: Pneumatic Compression Devices  Chaney Catheter: not present  Code Status: Full Code      Disposition Plan   Expected discharge: 1-3 days, recommended to prior living arrangement vs TCU once vitals stable; diagnostics  completed.  Entered: MATT Ron 05/27/2019, 9:46 AM       The patient's care was discussed with the Attending Physician, Dr. Porras, Bedside Nurse and Patient.    MATT Ron  Hospitalist Service, [address], [place]  Pager: [phone]  Please see sticky note for cross cover information  ______________________________________________________________________    Interval History   No acute events overnight. Reports she was up to use the restroom this AM with no SOB. Worked with PT and OT yesterday. PT recommending home with HHPT, OT recommends TCU vs home with OP OT. Reports constipation, however denies abdominal pain. Reports ongoing cough with moderate sputum production.     Denies chest pain, dyspnea, palpitations, dizziness, lightheadedness.    Data reviewed today: I reviewed all medications, new labs and imaging results over the last 24 hours. I personally reviewed no images or EKG's today.    Telemetry review:  SVT X 6 in the last 12 hours. Runs typically around 4 beats, longest run 16 beats. Occasional PVC and PAC.     Physical Exam   Vital Signs: Temp: 96.9  F (36.1  C) Temp src: Oral BP: 178/65 Pulse: 77 Heart Rate: 68 Resp: 16 SpO2: 92 % O2 Device: None (Room air)    Weight: 154 lbs 0 oz   GENERAL: Alert and oriented x 3. NAD. Lying in bed.   HEENT: Anicteric sclera. Mucous membranes moist and without lesions.   CV: RRR. S1, S2. IV/VI systolic murmurs appreciated.   RESPIRATORY: Effort normal on RA. Lung sounds diminished at bilateral bases otherwise CTAB with no wheezing, rales, rhonchi.   GI: Abdomen soft and non distended, bowel sounds present. No tenderness, rebound, guarding.   MUSCULOSKELETAL: No joint swelling or tenderness.  Ecchymosis of left lower extremity 3-4 digits.  NEUROLOGICAL: No focal deficits. Moves all extremities.  EXTREMITIES: No peripheral edema. Intact bilateral pedal pulses.   SKIN: No jaundice. No rashes.       Data   Recent  Labs   Lab 05/26/19  1126 05/25/19  0801 05/24/19  0435 05/23/19  0445 05/22/19  0335  05/21/19  1639  05/21/19  0751 05/21/19  0159   WBC 12.2* 9.4 6.3 8.2 7.6   < >  --    < >  --   --    HGB 9.4* 9.4* 8.3* 8.0* 7.0*   < >  --    < >  --   --    MCV 98 96 94 94 97   < >  --    < >  --   --     382 345 320 327   < >  --    < >  --   --    INR  --   --   --   --  0.99  --   --   --   --   --     138 136 140 139  --   --    < >  --   --    POTASSIUM 3.8 4.3 4.0 3.8 3.8  --   --    < >  --   --    CHLORIDE 104 106 104 108 106  --   --    < >  --   --    CO2 28 27 28 28 30  --   --    < >  --   --    BUN 13 20 13 17 10  --   --    < >  --   --    CR 2.23* 3.18* 2.12* 2.97* 2.24*  --   --    < >  --   --    ANIONGAP 4 4 5 4 3  --   --    < >  --   --    FRANCES 7.7* 7.3* 7.2* 7.1* 7.6*  --   --    < >  --   --    * 165* 148* 76 109*  --   --    < >  --   --    ALBUMIN  --   --   --  1.2* 1.2*  --   --    < >  --   --    PROTTOTAL  --   --   --  4.8* 5.0*  --   --   --   --   --    BILITOTAL  --   --   --  0.4 0.1*  --   --   --   --   --    ALKPHOS  --   --   --  100 102  --   --   --   --   --    ALT  --   --   --  10 10  --   --   --   --   --    AST  --   --   --  18 16  --   --   --   --   --    TROPI  --   --   --   --   --   --  0.047*  --  0.049* 0.048*    < > = values in this interval not displayed.     Medications       ammonium lactate   Topical BID     aspirin  81 mg Oral Daily     atorvastatin  40 mg Oral Daily     diltiazem  60 mg Oral Q6H LUIS     docusate sodium  100 mg Oral BID     fluticasone  1 spray Both Nostrils Daily     fluticasone-vilanterol  1 puff Inhalation Daily     levothyroxine  200 mcg Oral Daily     senna-docusate  1 tablet Oral BID    Or     senna-docusate  2 tablet Oral BID     sodium bicarbonate  650 mg Oral BID     sodium chloride (PF)  3 mL Intracatheter Q8H     tiotropium  18 mcg Inhalation Daily     vitamin D3  2,000 Units Oral Daily

## 2019-05-27 NOTE — PLAN OF CARE
D/I/A: Patient A & O X 4, VS , temp 96.3, P, 78, BP , 172/68, R 18, sat 93% RA . Patient denies pain denies chest pain . Telemetry NSR . Patient up to bathroom with SBA and walker . C/o constipations given laxatives no result yet. Patient with good appetite . Call light in reach, bed alarm on, and hourly round completed. Continue monitor closely and update MD with change.

## 2019-05-27 NOTE — PLAN OF CARE
Patient A/O, up with SBA and walker to bathroom, VSS. Telemetry NSR. No c/o chest pain, pt slept in between cares and safety checks. BP on right arm only, left arm has hx of arterial occlusion. Right side internal jugular for T,Th,Sun HD. Renal diet. No further requests made, will continue to monitor.

## 2019-05-28 LAB
ANION GAP SERPL CALCULATED.3IONS-SCNC: 7 MMOL/L (ref 3–14)
BUN SERPL-MCNC: 26 MG/DL (ref 7–30)
CALCIUM SERPL-MCNC: 7.1 MG/DL (ref 8.5–10.1)
CHLORIDE SERPL-SCNC: 106 MMOL/L (ref 94–109)
CO2 SERPL-SCNC: 26 MMOL/L (ref 20–32)
CREAT SERPL-MCNC: 3.5 MG/DL (ref 0.52–1.04)
ERYTHROCYTE [DISTWIDTH] IN BLOOD BY AUTOMATED COUNT: 16.4 % (ref 10–15)
GFR SERPL CREATININE-BSD FRML MDRD: 12 ML/MIN/{1.73_M2}
GLUCOSE BLDC GLUCOMTR-MCNC: 105 MG/DL (ref 70–99)
GLUCOSE BLDC GLUCOMTR-MCNC: 114 MG/DL (ref 70–99)
GLUCOSE BLDC GLUCOMTR-MCNC: 123 MG/DL (ref 70–99)
GLUCOSE BLDC GLUCOMTR-MCNC: 78 MG/DL (ref 70–99)
GLUCOSE SERPL-MCNC: 76 MG/DL (ref 70–99)
HCT VFR BLD AUTO: 30.1 % (ref 35–47)
HGB BLD-MCNC: 8.8 G/DL (ref 11.7–15.7)
INTERPRETATION ECG - MUSE: NORMAL
MAGNESIUM SERPL-MCNC: 2.2 MG/DL (ref 1.6–2.3)
MCH RBC QN AUTO: 28.5 PG (ref 26.5–33)
MCHC RBC AUTO-ENTMCNC: 29.2 G/DL (ref 31.5–36.5)
MCV RBC AUTO: 97 FL (ref 78–100)
PHOSPHATE SERPL-MCNC: 2.6 MG/DL (ref 2.5–4.5)
PLATELET # BLD AUTO: 359 10E9/L (ref 150–450)
POTASSIUM SERPL-SCNC: 3.5 MMOL/L (ref 3.4–5.3)
RBC # BLD AUTO: 3.09 10E12/L (ref 3.8–5.2)
SODIUM SERPL-SCNC: 139 MMOL/L (ref 133–144)
WBC # BLD AUTO: 11 10E9/L (ref 4–11)

## 2019-05-28 PROCEDURE — 84100 ASSAY OF PHOSPHORUS: CPT | Performed by: PHYSICIAN ASSISTANT

## 2019-05-28 PROCEDURE — 83735 ASSAY OF MAGNESIUM: CPT | Performed by: PHYSICIAN ASSISTANT

## 2019-05-28 PROCEDURE — 63400005 ZZH RX 634: Performed by: INTERNAL MEDICINE

## 2019-05-28 PROCEDURE — 25000128 H RX IP 250 OP 636: Performed by: INTERNAL MEDICINE

## 2019-05-28 PROCEDURE — 36415 COLL VENOUS BLD VENIPUNCTURE: CPT | Performed by: PHYSICIAN ASSISTANT

## 2019-05-28 PROCEDURE — 99207 ZZC APP CREDIT; MD BILLING SHARED VISIT: CPT | Performed by: PHYSICIAN ASSISTANT

## 2019-05-28 PROCEDURE — 25000132 ZZH RX MED GY IP 250 OP 250 PS 637: Performed by: PHYSICIAN ASSISTANT

## 2019-05-28 PROCEDURE — 90937 HEMODIALYSIS REPEATED EVAL: CPT

## 2019-05-28 PROCEDURE — 00000146 ZZHCL STATISTIC GLUCOSE BY METER IP

## 2019-05-28 PROCEDURE — 80048 BASIC METABOLIC PNL TOTAL CA: CPT | Performed by: PHYSICIAN ASSISTANT

## 2019-05-28 PROCEDURE — 12000001 ZZH R&B MED SURG/OB UMMC

## 2019-05-28 PROCEDURE — 85027 COMPLETE CBC AUTOMATED: CPT | Performed by: PHYSICIAN ASSISTANT

## 2019-05-28 RX ADMIN — Medication: at 07:55

## 2019-05-28 RX ADMIN — ACETAMINOPHEN 650 MG: 325 TABLET, FILM COATED ORAL at 21:06

## 2019-05-28 RX ADMIN — SENNOSIDES AND DOCUSATE SODIUM 2 TABLET: 8.6; 5 TABLET ORAL at 19:19

## 2019-05-28 RX ADMIN — IRON SUCROSE 100 MG: 20 INJECTION, SOLUTION INTRAVENOUS at 08:26

## 2019-05-28 RX ADMIN — FLUTICASONE FUROATE AND VILANTEROL TRIFENATATE 1 PUFF: 100; 25 POWDER RESPIRATORY (INHALATION) at 12:28

## 2019-05-28 RX ADMIN — FLUTICASONE PROPIONATE 1 SPRAY: 50 SPRAY, METERED NASAL at 12:28

## 2019-05-28 RX ADMIN — MELATONIN 2000 UNITS: at 13:03

## 2019-05-28 RX ADMIN — ATORVASTATIN CALCIUM 40 MG: 40 TABLET, FILM COATED ORAL at 13:03

## 2019-05-28 RX ADMIN — SODIUM CHLORIDE 250 ML: 9 INJECTION, SOLUTION INTRAVENOUS at 07:55

## 2019-05-28 RX ADMIN — SODIUM BICARBONATE 650 MG TABLET 650 MG: at 13:03

## 2019-05-28 RX ADMIN — DILTIAZEM HYDROCHLORIDE 240 MG: 240 CAPSULE, COATED, EXTENDED RELEASE ORAL at 13:03

## 2019-05-28 RX ADMIN — LEVOTHYROXINE SODIUM 200 MCG: 100 TABLET ORAL at 13:02

## 2019-05-28 RX ADMIN — Medication: at 19:22

## 2019-05-28 RX ADMIN — DOCUSATE SODIUM 100 MG: 100 CAPSULE, LIQUID FILLED ORAL at 19:19

## 2019-05-28 RX ADMIN — EPOETIN ALFA 4000 UNITS: 10000 SOLUTION INTRAVENOUS; SUBCUTANEOUS at 10:11

## 2019-05-28 RX ADMIN — SODIUM CHLORIDE 300 ML: 9 INJECTION, SOLUTION INTRAVENOUS at 07:55

## 2019-05-28 RX ADMIN — FUROSEMIDE 60 MG: 40 TABLET ORAL at 15:47

## 2019-05-28 RX ADMIN — ASPIRIN 81 MG: 81 TABLET, COATED ORAL at 12:27

## 2019-05-28 RX ADMIN — ACETAMINOPHEN 650 MG: 325 TABLET, FILM COATED ORAL at 15:47

## 2019-05-28 RX ADMIN — SODIUM BICARBONATE 650 MG TABLET 650 MG: at 19:19

## 2019-05-28 RX ADMIN — FUROSEMIDE 60 MG: 40 TABLET ORAL at 12:26

## 2019-05-28 RX ADMIN — TIOTROPIUM BROMIDE 18 MCG: 18 CAPSULE ORAL; RESPIRATORY (INHALATION) at 12:27

## 2019-05-28 RX ADMIN — DILTIAZEM HYDROCHLORIDE 60 MG: 60 TABLET, FILM COATED ORAL at 01:10

## 2019-05-28 ASSESSMENT — ACTIVITIES OF DAILY LIVING (ADL)
ADLS_ACUITY_SCORE: 14
ADLS_ACUITY_SCORE: 16

## 2019-05-28 NOTE — PROGRESS NOTES
CLINICAL NUTRITION SERVICES    Reviewed nutrition risk factors due to LOSx7. Pt is tolerating diet, eating well per nursing documentation. No nutrition issues identified at this time. RD will follow per protocol at this time, unless consulted.    Sudhir Love RD/ROSI  Pager [phone]

## 2019-05-28 NOTE — PROGRESS NOTES
[facility], [place]    Medicine Progress Note - Hospitalist Service, Gold 7       Date of Admission:  5/20/2019  Assessment & Plan    Kim Anne is a 73 year old female admitted on 5/20/2019. 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 She is admitted for evaluation of exertional chest pain and dyspnea.      # Exertional chest pain and dyspnea:  Initial concern for unstable angina given slightly elevated, but stable, troponin. Echo 5/21 showed normal LV function (EF 60-65%), no wall motion abnormalities, normal RV function, and elevated PA pressures c/w pulmonary hypertension. Cardiology consulted, s/p coronary angio 5/23 which showed non-obstructive CAD. No intervention needed. SVT noted during heart cath with associated symptom of chest tightness and hypotension. Patient reported to have SVT/tachycarida previously during HD runs. Cardiology suspects presenting symptoms d/t SVT. Clinically improved, however continues to have intermittent episodes of SOB.   - EP consulted, see below   - Continue ASA 81mg daily per cards recs  - Consider repeat EKG for recurrent symptoms     # Symptomatic paroxysmal SVT and non-sustained VT:  See above. Noted to have frequent runs of SVT during heart cath 5/23 with associated chest tightness and hypotension. Cardiology suspect presenting symptoms d/t arrhythmia. Continues to have brief paroxysms SVT which resolved spontaneously about 13 times in the last 24 hours. BP stable. Minimally symptomatic with episodes at this time. Mg 1.3 5/26, replaced, repeat 2.5.  - Cardizem transitioned to long acting today 240 mg with hold parameters (SBP <110 or HR <55)   - EP consulted, would like to  hold off on EP study at this time. Plan to follow up outpatient in about one month with consideration for halter to determine arrhythmia burden. Cardiology will arrange.   - Continue telemetry      # Acute hypoxic respiratory failure: Etiology multifactorial, with SVT, and bilateral pleural effusions contributing with poor reserve 2/2 COPD. Symptoms improved with better HR control. Pleural effusions stable, currently on room air. Lungs clear on exam. Sputum culture 5/21 grew Hemophilus influenzae and Moraxella catarrhalis but no clinical signs/symptoms of active pneumonia, therefore suspect colonization. CT chest 5/25 with scattered groundglass opacities. Continues to do well on RA. No desaturations with PT/OT.  WBC mildly elevated 5/26, down trend today, continues to have ongoing/chronic cough.   - O2 prn to maintain sats >90%.   - Monitor for fevers, worsening cough or increased sputum production  - Consider therapeutic thoracentesis if develops worsening symptoms  - Repeat CBC in AM     #Leukocytosis: WBC 12.2 5/26, down trended today. Unclear etiology at this time. Patient has persistent chronic cough with no change in sputum production. Sputum cultures 5/21 grew Hemophilus influenzae and Moraxella catarrhalis but no acute signs of pneumonia. CTA chest 5/25 with scattered groundglass opacities in lung possibly representing infectious process. No urinary symptoms. Afebrile and hemodynamically stable.   - Repeat CBC in AM   - If WBC continues to trend up or becomes febrile would initiate broad spectrum abx and obtain blood cultures      # Occluded L subclavian stent:  Asymmetric BP noted 5/24 with SBP 70's on left and 120's on right. Delayed and diminished L radial pulse also noted on exam. LUE arterial US negative for focal stenosis or occlusion. CTA 5/25 negative for dissection but shows occlusion of previously placed L subclavian stent. Case discussed with IR - appears to have good collaterals. Currently no  ischemic symptoms, therefore no indication for inpatient intervention.   - Monitor for symptoms of ischemia  - Check blood pressures on R arm for most accurate reading  - Will need OP follow up with IR for definitive treatment with angioplasty    # HTN:  PTA amlodipine, metoprolol and lasix held d/t low BP secondary to SVT. Unclear if low BP's measured on LUE, which has known arterial occlusion. BP now improved. Hypertension this AM, improved.   - Continue diltiazem as above   - Continue PTA lasix 60 mg BID (resumed 5/27)  - Please check BP on right arm only     # ESRD on HD; Hx solitary kidney:  Secondary to diabetic nephropathy (biopsy proven). HD initiated 5/2/19, currently TTS via RIJ tunneled catheter. No acute concerns.   - Nephrology consulted  - HD q TTS   - Daily weights  - Daily BMP, Mg, Phos    # Hypothyroidism:  TSH 15 on admission, likely d/t medication non-compliance. Prior TSH 1.12 on current dose of levothyroxine, indicating adequate replacement when taking regularly.    - Continue levothyroxine 200mcg daily  - Repeat TFT's in 4-6 weeks    # Type II DM:  Diet controlled. A1C 5.2 on 5/1/19. Adequately controlled on diet alone.   - Glucose checks AC/HS   - Hypoglycemia protocol ordered     Stable chronic problems:    # COPD:  No wheezing on exam to suggest acute exacerbation. Cont breo ellipta, spiriva, and duoneb QID.   # Hx LTBI with positive quantiferon gold:  Reportedly treated in 1970's. No history of new exposures. CXR negative for evidence of active TB. ID consulted. No indication for further evaluation or airborne isolation d/t positive quantiferon gold alone. Single sputum sent for AFB stain/culture negative.    # Chronic anemia:  Likely anemia of chronic renal disease. Hgb stable.   # Tobacco abuse:  Encouraged cessation.       Diet: Renal Diet (non-dialysis)    DVT Prophylaxis: Pneumatic Compression Devices  Chaney Catheter: not present  Code Status: Full Code         Disposition Plan    Expected discharge: 1-2 days, recommended to prior living arrangement with HH PT and OT once breathing at baseline, vitals stable.  Entered: MATT Ron 05/28/2019, 12:57 PM       The patient's care was discussed with the Attending Physician, Dr. Gao, Bedside Nurse, Care Coordinator/, EP cardiology consult and Patient.    MATT Ron  Hospitalist Service, [address], [place]  Pager: [phone]  Please see sticky note for cross cover information  ______________________________________________________________________    Interval History   Patient seen on dialysis. Notes she slept well overnight. No acute issues overnight. Reports she felt SOB yesterday when she was walking with PT. Continues to have chronic cough with no changes, stable sputum production. Denies chest pain, SOB, palpitations, nausea, vomiting, abdominal pain, constipation, diarrhea, dysuria.      ROS: Pulm, CV, GI and  performed and negative unless otherwise noted above.       Data reviewed today: I reviewed all medications, new labs and imaging results over the last 24 hours. I personally reviewed no images or EKG's today.    Physical Exam   Vital Signs: Temp: 98.7  F (37.1  C) Temp src: Oral BP: 149/81 Pulse: 66 Heart Rate: 73 Resp: 18 SpO2: 99 % O2 Device: None (Room air) Oxygen Delivery: 2 LPM  Weight: 146 lbs 11.2 oz  GENERAL: Alert and oriented x 3. NAD. Lying in bed on dialysis.    HEENT: Anicteric sclera. Mucous membranes moist and without lesions.   CV: RRR. S1, S2. IV/VI systolic murmurs appreciated.   RESPIRATORY: Effort normal on RA. Lung sounds diminished at bilateral bases otherwise CTAB with no wheezing, rales, rhonchi.   GI: Abdomen soft and non distended, bowel sounds present. No tenderness, rebound, guarding.   MUSCULOSKELETAL: No joint swelling or tenderness.  Ecchymosis of left lower extremity 3-4 digits.  NEUROLOGICAL: No focal deficits. Moves all  extremities.  EXTREMITIES: No peripheral edema. Intact bilateral pedal pulses.   SKIN: No jaundice. No rashes.     Lines: right internal jugular in place, no surrounding erythema or edema   PIV left lower forearm       Data   Recent Labs   Lab 05/28/19  0523 05/26/19  1126 05/25/19  0801  05/23/19  0445 05/22/19  0335  05/21/19  1639   WBC 11.0 12.2* 9.4   < > 8.2 7.6   < >  --    HGB 8.8* 9.4* 9.4*   < > 8.0* 7.0*   < >  --    MCV 97 98 96   < > 94 97   < >  --     422 382   < > 320 327   < >  --    INR  --   --   --   --   --  0.99  --   --     136 138   < > 140 139   < >  --    POTASSIUM 3.5 3.8 4.3   < > 3.8 3.8   < >  --    CHLORIDE 106 104 106   < > 108 106   < >  --    CO2 26 28 27   < > 28 30   < >  --    BUN 26 13 20   < > 17 10   < >  --    CR 3.50* 2.23* 3.18*   < > 2.97* 2.24*   < >  --    ANIONGAP 7 4 4   < > 4 3   < >  --    FRANCES 7.1* 7.7* 7.3*   < > 7.1* 7.6*   < >  --    GLC 76 115* 165*   < > 76 109*   < >  --    ALBUMIN  --   --   --   --  1.2* 1.2*  --   --    PROTTOTAL  --   --   --   --  4.8* 5.0*  --   --    BILITOTAL  --   --   --   --  0.4 0.1*  --   --    ALKPHOS  --   --   --   --  100 102  --   --    ALT  --   --   --   --  10 10  --   --    AST  --   --   --   --  18 16  --   --    TROPI  --   --   --   --   --   --   --  0.047*    < > = values in this interval not displayed.     Medications       ammonium lactate   Topical BID     aspirin  81 mg Oral Daily     atorvastatin  40 mg Oral Daily     diltiazem ER COATED BEADS  240 mg Oral Daily     docusate sodium  100 mg Oral BID     fluticasone  1 spray Both Nostrils Daily     fluticasone-vilanterol  1 puff Inhalation Daily     furosemide  60 mg Oral BID     levothyroxine  200 mcg Oral Daily     senna-docusate  1 tablet Oral BID    Or     senna-docusate  2 tablet Oral BID     sodium bicarbonate  650 mg Oral BID     sodium chloride (PF)  3 mL Intracatheter Q8H     tiotropium  18 mcg Inhalation Daily     vitamin D3  2,000 Units  Oral Daily

## 2019-05-28 NOTE — PLAN OF CARE
A:  patient denies pain.  Did have difficulty sleeping and melatonin given with relief.  Patient up to bathroom with one assist.  Had large stool and voided large amount.  Plan for dialysis in am and MD considering EP also.  Not scheduled yet.    R:  continue to monitor and treat per plan of care.

## 2019-05-28 NOTE — PLAN OF CARE
D: Patient had dialysis this am, and has been up with walker to bathroom with one assist. Bed alarm on to remind her to call. Ate meal well, glucose stable. Occas. Coarse cough, use of inhalers and possible neb treatment if needed. Tele stable with occas. Elevations in heart rate with activity.

## 2019-05-28 NOTE — PLAN OF CARE
Patient is alert and oriented x4, Patient is vitally stable on room air. The patient denies pain and nausea. The patient is NPO for an electrophysiology study tomorrow but she is also scheduled to go to dialysis at 8 am tomorrow morning and I am not sure when they are planning on doing the EP. The patient has been pretty quiet this shift watching TV. Continue to monitor and notify MD of any changes.

## 2019-05-28 NOTE — PROGRESS NOTES
HEMODIALYSIS TREATMENT NOTE    Date: 5/28/2019  Time: 12:13 PM    Data:  Pre Wt: 66.5 kg    Desired Wt: 64.5 kg   Ultrafiltration - Post Run Net Total Removed (mL): 1500 mL  Ultrafiltration - Post Run Net Total Gain (mL): 0 mL  Vascular Access Status: Yes, secured and visible  Dialyzer Rinse: Streaked, Light  Total Blood Volume Processed: 81.2 Liters  Total Dialysis (Treatment) Time: 3.5 hrs      Interventions/Assessment:  3.5 hr HD tx via right CVC on K+3 Ca 3 bath with . Keep SBP > 100. Epogen and Venofer given IV. Catheter dressing changed per protocol. Pt in NSR with HR 65-80. Patient had 3 brief episodes where HR was in the 120s and then HR quickly returned to 70s. Pt asymptomatic. Nephrologist Yola moreau. UF goal reduced from 2 kg to 1.5 kg. CVC saline locked with clear guard caps. Post /81 and report given to primary RN.       Plan:    Next HD tx per renal team.

## 2019-05-28 NOTE — PROGRESS NOTES
Care Coordinator Progress Note    Admission Date/Time:  5/20/2019  Attending MD:  Flakito Gao MD    Data  Chart reviewed, discussed with interdisciplinary team.   Patient was admitted for:    Generalized muscle weakness  Chest pain, unspecified type  Dyspnea, unspecified type  Pleural effusion  S/P coronary angiogram.    Assessment   Concerns with insurance coverage for discharge needs: None.  Current Living Situation: Patient lives with family-son  Support System: Supportive and Involved  Services Involved: Dialysis Services  Transportation at Discharge: Car and Family or friend will provide  Barriers to Discharge: pending medical stability and safe d/c plan     Coordination of Care and Referrals:   Per previous RNCC pt dialyzes at [facility] ([phone]/Fax: [phone]) T/Th/Sat. Pt lives with her son and had no home services prior to admission.  Per record patient has a [employer] ETTA Mai [phone].    Plan is for CV to follow up with pt and discuss possible EP vs continued monitoring.    [facility]  [address] [place] [id number]  Ph [phone] Fx [phone]    PT OT recommend discharge to TCU pt currently is declining. Met with pt and referral to [facility] for home PT OT and safety eval at home.      _______________________  [facility]  Phone  [phone]  Fax  [phone]  ______________________     Plan  Anticipated Discharge Date: Pending medical decisions  Anticipated Discharge Plan:  discharge to TCU if pt agrees or referral has been made to [facility].      Kandi Trejo, ALMA, BSN    [facility]    Medicine Group  [address] [place] [address]    [email or web address]  [email or web address]    Office: [phone] Pager: [phone]  To contact weekend RNCC, dial * [phone] and enter pager number [phone] at prompt. This pager can not be contacted by text page or outside line.

## 2019-05-29 ENCOUNTER — APPOINTMENT (OUTPATIENT)
Dept: GENERAL RADIOLOGY | Facility: CLINIC | Age: 74
DRG: 286 | End: 2019-05-29
Attending: PHYSICIAN ASSISTANT
Payer: COMMERCIAL

## 2019-05-29 ENCOUNTER — APPOINTMENT (OUTPATIENT)
Dept: PHYSICAL THERAPY | Facility: CLINIC | Age: 74
DRG: 286 | End: 2019-05-29
Payer: COMMERCIAL

## 2019-05-29 ENCOUNTER — APPOINTMENT (OUTPATIENT)
Dept: OCCUPATIONAL THERAPY | Facility: CLINIC | Age: 74
DRG: 286 | End: 2019-05-29
Payer: COMMERCIAL

## 2019-05-29 LAB
ANION GAP SERPL CALCULATED.3IONS-SCNC: 4 MMOL/L (ref 3–14)
BUN SERPL-MCNC: 14 MG/DL (ref 7–30)
CA-I SERPL ISE-MCNC: 4.3 MG/DL (ref 4.4–5.2)
CALCIUM SERPL-MCNC: 7.2 MG/DL (ref 8.5–10.1)
CHLORIDE SERPL-SCNC: 105 MMOL/L (ref 94–109)
CO2 SERPL-SCNC: 29 MMOL/L (ref 20–32)
CREAT SERPL-MCNC: 2.47 MG/DL (ref 0.52–1.04)
CRP SERPL-MCNC: 38 MG/L (ref 0–8)
ERYTHROCYTE [DISTWIDTH] IN BLOOD BY AUTOMATED COUNT: 16.6 % (ref 10–15)
GFR SERPL CREATININE-BSD FRML MDRD: 19 ML/MIN/{1.73_M2}
GLUCOSE BLDC GLUCOMTR-MCNC: 135 MG/DL (ref 70–99)
GLUCOSE BLDC GLUCOMTR-MCNC: 191 MG/DL (ref 70–99)
GLUCOSE BLDC GLUCOMTR-MCNC: 78 MG/DL (ref 70–99)
GLUCOSE BLDC GLUCOMTR-MCNC: 94 MG/DL (ref 70–99)
GLUCOSE SERPL-MCNC: 81 MG/DL (ref 70–99)
HCT VFR BLD AUTO: 25.7 % (ref 35–47)
HGB BLD-MCNC: 7.6 G/DL (ref 11.7–15.7)
MAGNESIUM SERPL-MCNC: 1.7 MG/DL (ref 1.6–2.3)
MCH RBC QN AUTO: 28.6 PG (ref 26.5–33)
MCHC RBC AUTO-ENTMCNC: 29.6 G/DL (ref 31.5–36.5)
MCV RBC AUTO: 97 FL (ref 78–100)
PHOSPHATE SERPL-MCNC: 2.3 MG/DL (ref 2.5–4.5)
PLATELET # BLD AUTO: 334 10E9/L (ref 150–450)
POTASSIUM SERPL-SCNC: 3.4 MMOL/L (ref 3.4–5.3)
PROCALCITONIN SERPL-MCNC: 0.68 NG/ML
RBC # BLD AUTO: 2.66 10E12/L (ref 3.8–5.2)
SODIUM SERPL-SCNC: 138 MMOL/L (ref 133–144)
WBC # BLD AUTO: 6.2 10E9/L (ref 4–11)

## 2019-05-29 PROCEDURE — 82330 ASSAY OF CALCIUM: CPT | Performed by: PHYSICIAN ASSISTANT

## 2019-05-29 PROCEDURE — 83735 ASSAY OF MAGNESIUM: CPT | Performed by: PHYSICIAN ASSISTANT

## 2019-05-29 PROCEDURE — 85027 COMPLETE CBC AUTOMATED: CPT | Performed by: PHYSICIAN ASSISTANT

## 2019-05-29 PROCEDURE — 84100 ASSAY OF PHOSPHORUS: CPT | Performed by: PHYSICIAN ASSISTANT

## 2019-05-29 PROCEDURE — 97110 THERAPEUTIC EXERCISES: CPT | Mod: GP | Performed by: PHYSICAL THERAPIST

## 2019-05-29 PROCEDURE — 25000125 ZZHC RX 250: Performed by: PHYSICIAN ASSISTANT

## 2019-05-29 PROCEDURE — 25000132 ZZH RX MED GY IP 250 OP 250 PS 637: Performed by: PHYSICIAN ASSISTANT

## 2019-05-29 PROCEDURE — 97535 SELF CARE MNGMENT TRAINING: CPT | Mod: GO

## 2019-05-29 PROCEDURE — 36415 COLL VENOUS BLD VENIPUNCTURE: CPT | Performed by: PHYSICIAN ASSISTANT

## 2019-05-29 PROCEDURE — 71046 X-RAY EXAM CHEST 2 VIEWS: CPT

## 2019-05-29 PROCEDURE — 84145 PROCALCITONIN (PCT): CPT | Performed by: PHYSICIAN ASSISTANT

## 2019-05-29 PROCEDURE — 80048 BASIC METABOLIC PNL TOTAL CA: CPT | Performed by: PHYSICIAN ASSISTANT

## 2019-05-29 PROCEDURE — 25800030 ZZH RX IP 258 OP 636: Performed by: PHYSICIAN ASSISTANT

## 2019-05-29 PROCEDURE — 97116 GAIT TRAINING THERAPY: CPT | Mod: GP | Performed by: PHYSICAL THERAPIST

## 2019-05-29 PROCEDURE — 00000146 ZZHCL STATISTIC GLUCOSE BY METER IP

## 2019-05-29 PROCEDURE — 12000001 ZZH R&B MED SURG/OB UMMC

## 2019-05-29 PROCEDURE — 99207 ZZC APP CREDIT; MD BILLING SHARED VISIT: CPT | Performed by: PHYSICIAN ASSISTANT

## 2019-05-29 PROCEDURE — 86140 C-REACTIVE PROTEIN: CPT | Performed by: PHYSICIAN ASSISTANT

## 2019-05-29 RX ADMIN — SODIUM BICARBONATE 650 MG TABLET 650 MG: at 20:00

## 2019-05-29 RX ADMIN — FUROSEMIDE 60 MG: 40 TABLET ORAL at 15:53

## 2019-05-29 RX ADMIN — FLUTICASONE PROPIONATE 1 SPRAY: 50 SPRAY, METERED NASAL at 08:06

## 2019-05-29 RX ADMIN — TIOTROPIUM BROMIDE 18 MCG: 18 CAPSULE ORAL; RESPIRATORY (INHALATION) at 08:07

## 2019-05-29 RX ADMIN — SODIUM BICARBONATE 650 MG TABLET 650 MG: at 08:09

## 2019-05-29 RX ADMIN — SENNOSIDES AND DOCUSATE SODIUM 2 TABLET: 8.6; 5 TABLET ORAL at 08:08

## 2019-05-29 RX ADMIN — ATORVASTATIN CALCIUM 40 MG: 40 TABLET, FILM COATED ORAL at 08:08

## 2019-05-29 RX ADMIN — FLUTICASONE FUROATE AND VILANTEROL TRIFENATATE 1 PUFF: 100; 25 POWDER RESPIRATORY (INHALATION) at 08:12

## 2019-05-29 RX ADMIN — Medication: at 08:15

## 2019-05-29 RX ADMIN — DILTIAZEM HYDROCHLORIDE 240 MG: 240 CAPSULE, COATED, EXTENDED RELEASE ORAL at 08:09

## 2019-05-29 RX ADMIN — ASPIRIN 81 MG: 81 TABLET, COATED ORAL at 08:08

## 2019-05-29 RX ADMIN — DOCUSATE SODIUM 100 MG: 100 CAPSULE, LIQUID FILLED ORAL at 08:07

## 2019-05-29 RX ADMIN — MELATONIN 2000 UNITS: at 08:08

## 2019-05-29 RX ADMIN — LEVOTHYROXINE SODIUM 200 MCG: 100 TABLET ORAL at 08:08

## 2019-05-29 RX ADMIN — SODIUM PHOSPHATE, MONOBASIC, MONOHYDRATE AND SODIUM PHOSPHATE, DIBASIC, ANHYDROUS 15 MMOL: 276; 142 INJECTION, SOLUTION INTRAVENOUS at 10:50

## 2019-05-29 RX ADMIN — Medication: at 20:02

## 2019-05-29 RX ADMIN — FUROSEMIDE 60 MG: 40 TABLET ORAL at 08:07

## 2019-05-29 ASSESSMENT — ACTIVITIES OF DAILY LIVING (ADL)
ADLS_ACUITY_SCORE: 16

## 2019-05-29 NOTE — PROGRESS NOTES
[facility] [employer]    Medicine Progress Note - Hospitalist Service, Gold 7       Date of Admission:  5/20/2019  Assessment & Plan       Kim Anne is a 73 year old female admitted on 5/20/2019. 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 She is admitted for evaluation of exertional chest pain and dyspnea.      # Exertional chest pain and dyspnea:  Initial concern for unstable angina given slightly elevated, but stable, troponin. Echo 5/21 showed normal LV function (EF 60-65%), no wall motion abnormalities, normal RV function, and elevated PA pressures c/w pulmonary hypertension. Cardiology consulted, s/p coronary angio 5/23 which showed non-obstructive CAD. No intervention needed. SVT noted during heart cath with associated symptom of chest tightness and hypotension. Patient reported to have SVT/tachycarida previously during HD runs. Cardiology suspects presenting symptoms d/t SVT. Clinically improved, however continues to have intermittent episodes of SOB while walking.   - EP consulted, see below   - Continue ASA 81mg daily per cards recs  - Consider repeat EKG for recurrent symptoms     # Symptomatic paroxysmal SVT and non-sustained VT:  See above. Noted to have frequent runs of SVT during heart cath 5/23 with associated chest tightness and hypotension. Cardiology suspect presenting symptoms d/t arrhythmia. Continues to have brief paroxysms SVT which resolved spontaneously frequency decreased. BP stable. Minimally symptomatic with episodes at this time. Mg 1.3 5/26, replaced, repeat 2.5.  - Continue Cardizem long acting today 240 mg with hold parameters (SBP <110 or HR <55)   - EP consulted, would like to hold  off on EP study at this time. Plan to follow up outpatient in about one month with consideration for halter to determine arrhythmia burden. Cardiology will arrange.   - Continue telemetry      # Acute hypoxic respiratory failure: Etiology multifactorial, with SVT, and bilateral pleural effusions contributing with poor reserve 2/2 COPD. Symptoms improved with better HR control. Pleural effusions stable, currently on room air. Lungs course on exam. Sputum culture 5/21 grew Hemophilus influenzae and Moraxella catarrhalis but no clinical signs/symptoms of active pneumonia, therefore suspect colonization. CT chest 5/25 with scattered groundglass opacities. Continues to do well on RA. No desaturations with PT/OT.  WBC mildly elevated 5/26, down trend today, continues to have ongoing/chronic cough.   - Repeat CXR today, right pleural effusion seems to be improving, final read pending   - O2 prn to maintain sats >90%.   - Monitor for fevers, worsening cough or increased sputum production  - Repeat CBC in AM     #Leukocytosis: WBC 12.2 5/26, down trended today. Unclear etiology at this time. Patient has persistent chronic cough with no change in sputum production. Sputum cultures 5/21 grew Hemophilus influenzae and Moraxella catarrhalis but no acute signs of pneumonia. CTA chest 5/25 with scattered groundglass opacities in lung possibly representing infectious process. No urinary symptoms. Low grade temp last evening, afebrile today and remains hemodynamically stable. Procal 0.68 today, CRP down trending.   - Repeat CBC in AM   - Repeat procal in AM   - Hold on antibiotics at this time   - CXR today, read pending   - If WBC continues to trend up or becomes febrile would initiate broad spectrum abx and obtain blood cultures      # Occluded L subclavian stent:  Asymmetric BP noted 5/24 with SBP 70's on left and 120's on right. Delayed and diminished L radial pulse also noted on exam. LUE arterial US negative for focal stenosis  or occlusion. CTA 5/25 negative for dissection but shows occlusion of previously placed L subclavian stent. Case discussed with IR - appears to have good collaterals. Currently no ischemic symptoms, therefore no indication for inpatient intervention.   - Monitor for symptoms of ischemia  - Check blood pressures on R arm for most accurate reading  - Will need OP follow up with IR for definitive treatment with angioplasty    # HTN:  PTA amlodipine, metoprolol and lasix held d/t low BP secondary to SVT. Unclear if low BP's measured on LUE, which has known arterial occlusion. BP now improved. Blood pressures stable.    - Continue diltiazem as above   - Continue PTA lasix 60 mg BID (resumed 5/27)  - Please check BP on right arm only     # ESRD on HD; Hx solitary kidney:  Secondary to diabetic nephropathy (biopsy proven). HD initiated 5/2/19, currently TTS via RIJ tunneled catheter. No acute concerns.   - Nephrology consulted  - HD q TTS   - Daily weights  - Daily BMP, Mg, Phos    # Hypothyroidism:  TSH 15 on admission, likely d/t medication non-compliance. Prior TSH 1.12 on current dose of levothyroxine, indicating adequate replacement when taking regularly.    - Continue levothyroxine 200mcg daily  - Repeat TFT's in 4-6 weeks    # Type II DM:  Diet controlled. A1C 5.2 on 5/1/19. Adequately controlled on diet alone.   - Glucose checks AC/HS   - Hypoglycemia protocol ordered     Stable chronic problems:    # COPD:  No wheezing on exam to suggest acute exacerbation. Cont breo ellipta, spiriva, and duoneb QID.   # Hx LTBI with positive quantiferon gold:  Reportedly treated in 1970's. No history of new exposures. CXR negative for evidence of active TB. ID consulted. No indication for further evaluation or airborne isolation d/t positive quantiferon gold alone. Single sputum sent for AFB stain/culture negative.    # Chronic anemia:  Likely anemia of chronic renal disease. Hgb stable.   # Tobacco abuse:  Encouraged cessation.  "      Diet: Renal Diet (non-dialysis)    DVT Prophylaxis: Pneumatic Compression Devices  Chaney Catheter: not present  Code Status: Full Code         Disposition Plan   Expected discharge: Tomorrow, recommended to transitional care unit vs home with HH PT and OT once safe disposition plan/ TCU bed available and vital signs stable.  Entered: MATT Ron 05/29/2019, 1:55 PM       The patient's care was discussed with the Attending Physician, Dr. Gao, Bedside Nurse, Care Coordinator/ and Patient.    MATT Ron  Hospitalist Service, [address], [place]  Pager: [phone]  Please see sticky note for cross cover information  ______________________________________________________________________    Interval History   Patient reports feeling well today. Notes she continued to have racing heart rate but \"only once in great while.\" Up walking the halls today with some SOB. No hypoxia noted. Denies fever or chills, denies chest pain, increased cough, chest congestion or sputum production.     ROS: Pulm, CV, GI and  performed and negative unless otherwise noted above.     Data reviewed today: I reviewed all medications, new labs and imaging results over the last 24 hours. I personally reviewed the chest x-ray image(s) showing decrease size of right pleural effusion, final read pending.    Physical Exam   Vital Signs: Temp: 96.9  F (36.1  C) Temp src: Oral BP: 146/53   Heart Rate: 68 Resp: 20 SpO2: 93 % O2 Device: None (Room air)    Weight: 145 lbs 4.8 oz  GENERAL: Alert and oriented x 3. NAD. Lying in bed on dialysis.    HEENT: Anicteric sclera. Mucous membranes moist and without lesions.   CV: RRR. S1, S2. IV/VI systolic murmurs appreciated.   RESPIRATORY: Effort normal on RA. Lung sounds diminished at bilateral bases otherwise CTAB with no wheezing, rales, rhonchi.   GI: Abdomen soft and non distended, bowel sounds present. No tenderness, rebound, " guarding.   MUSCULOSKELETAL: No joint swelling or tenderness.    NEUROLOGICAL: No focal deficits. Moves all extremities.  EXTREMITIES: No peripheral edema. Intact bilateral pedal pulses.   SKIN: No jaundice. No rashes.      Lines: right internal jugular in place, no surrounding erythema or edema   PIV left lower forearm    Data   Recent Labs   Lab 05/29/19  0452 05/28/19  0523 05/26/19  1126  05/23/19  0445   WBC 6.2 11.0 12.2*   < > 8.2   HGB 7.6* 8.8* 9.4*   < > 8.0*   MCV 97 97 98   < > 94    359 422   < > 320    139 136   < > 140   POTASSIUM 3.4 3.5 3.8   < > 3.8   CHLORIDE 105 106 104   < > 108   CO2 29 26 28   < > 28   BUN 14 26 13   < > 17   CR 2.47* 3.50* 2.23*   < > 2.97*   ANIONGAP 4 7 4   < > 4   FRANCES 7.2* 7.1* 7.7*   < > 7.1*   GLC 81 76 115*   < > 76   ALBUMIN  --   --   --   --  1.2*   PROTTOTAL  --   --   --   --  4.8*   BILITOTAL  --   --   --   --  0.4   ALKPHOS  --   --   --   --  100   ALT  --   --   --   --  10   AST  --   --   --   --  18    < > = values in this interval not displayed.       Medications       ammonium lactate   Topical BID     aspirin  81 mg Oral Daily     atorvastatin  40 mg Oral Daily     diltiazem ER COATED BEADS  240 mg Oral Daily     docusate sodium  100 mg Oral BID     fluticasone  1 spray Both Nostrils Daily     fluticasone-vilanterol  1 puff Inhalation Daily     furosemide  60 mg Oral BID     levothyroxine  200 mcg Oral Daily     senna-docusate  1 tablet Oral BID    Or     senna-docusate  2 tablet Oral BID     sodium bicarbonate  650 mg Oral BID     sodium chloride (PF)  3 mL Intracatheter Q8H     tiotropium  18 mcg Inhalation Daily     vitamin D3  2,000 Units Oral Daily

## 2019-05-29 NOTE — PLAN OF CARE
Discharge Planner PT   Patient plan for discharge: Per pt, should would like to discuss option of TCU  Current status: Pt walked 150' with standing rest period.  02 sats 96% and HR 88 prior to actiivty.  02 sats remained 96-98 with activity on RA but noted mild SOB with activity. No report of dizziness. Bed alarm on.   Barriers to return to prior living situation: fatigue,  needs supervision,   Recommendations for discharge: Pt will need close and contant supervision and assist with ADL's  Would benefit from continued therapies.   Rationale for recommendations: Pt seems a little forgetful at times.  Walking greater distances but likely below baseline.  Continue to address strengthening and mobility, and endurance.        Entered by: Linda Nunez 05/29/2019 11:02 AM

## 2019-05-29 NOTE — PLAN OF CARE
A:  patient resting well tonight.  Denies pain.  Assisted to bathroom times one.  Bed alarm on due to forgetfulness.    Vital signs WN.    R:  continue to monitor and treat per plan of care.

## 2019-05-29 NOTE — PLAN OF CARE
Discharge Planner OT   Patient plan for discharge: TCU.  Current status: Patient with poor safety awareness and readily fatigued with light ADL tasks. Minimal assistance required for toilet transfer with verbal cueing required to use grab bars properly. Discussion held with patient and care coordinator during treatment session and patient now agreeable and wanting to discharge to TCU. Patient reported she wants to get stronger and go to rehab.  Barriers to return to prior living situation: Strength, endurance, balance, cognition.  Recommendations for discharge: TCU.  Rationale for recommendations: To progress functional independence.       Entered by: Zakiya Wiley 05/29/2019 11:28 AM

## 2019-05-29 NOTE — PROGRESS NOTES
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  Nephrology consulted for management of her ESKD.     ESKD on IHD initiated IHD 5/2/19  Creatinine was 4.7 with GFR ~8 with >10g/g of albuminuria  Bx 2015 with DM nephropathy  Kidney function had been slowly declining  HD initiated 5/2/19   She is dialyzing at [facility] under care of Dr Liz on TTS schedule  Treatment time 3.5hrs , last Kt/V 1.05, EDW 67Kg--> she is now around 63-64kg with weight loss and poor appetite  -- will dialyze based on her TTS schedule  -- since initiation she has lost >10 kgs and continues to have poor appetite   -- will monitor closely  -- please get daily standing weights      Electrolytes are stable  Mild hypocalcemia -- corrected wnl (improved since dialysis initiation)  -- will dialyze on 2mcg Hectorol for now.     Euvolemic and hx of HTN  Prior to dialysis initiation was on amlodipine 10 , furosemide 40mg bid , hydralazine 25mg BiD and metoprolol 25 daily  -- will continue on lasix to 80mg BiD and amlodipine 10mg   -- goal /80s     Anemia   ACD/inflamm  Iron stores are low Isat 18% with Ferritin 265  -- EPO dosed at 4000U with Venofer 100mg Qhd  -- She is on Mircera 100mg as OP Q2wk     MBD    Hypocalcemia improved from before and phos low at 2.7 last checked  Vit D 15  -- Continue on PO 2000U D3  -- Will resume on Hectorol 2mcg with IHD     DM 2 on Insulin     Chest Pain with mild troponin leak  PSVT   SVT over night , plans for cath today  -- ECHO today with concentric LVH and 55-60% EF and pulm HTN  -- EP to evaluate for recurrent SVT on diltiazem   -- Cardiology  following      Recommendations were communicated to primary team     Seen and discussed with Dr. Mehran Sheffield MD   [phone]    Interval History :   Nursing and provider notes from last 24 hours reviewed.  Seen on dialysis and has x2 episodes of SVT otherwsie  , does not report any new issues today except fatigue and poor appetite    Review of Systems:   I reviewed the following systems:  GI: + appetite. - nausea or vomiting or diarrhea.   Neuro:  - confusion  Constitutional:  - fever or chills  CV: - dyspnea or edema.  - chest pain.    Physical Exam:   I/O last 3 completed shifts:  In: 20 [P.O.:20]  Out: 2300 [Urine:800; Other:1500]   /46 (BP Location: Right arm)   Pulse 66   Temp 99  F (37.2  C) (Oral)   Resp 18   Wt 65.9 kg (145 lb 4.8 oz)   SpO2 94%   BMI 24.94 kg/m       GENERAL APPEARANCE: NAD  EYES:  - scleral icterus, pupils equal  HENT: mouth without ulcers or lesions  PULM: lungs clear to auscultation bilaterally, equal air movement, no clubbing  CV: regular rhythm, normal rate, no rub     -JVD -     -edema -   GI: soft, non tender, non distended, bowel sounds are +  INTEGUMENT: no cyanosis, - rash  NEURO:  - asterixis   Access RIJ access CVC tunneled     Labs:   All labs reviewed by me  Electrolytes/Renal -   Recent Labs   Lab Test 05/28/19 0523 05/26/19  2341 05/26/19  1126 05/25/19  1840 05/25/19  0801     --  136  --  138   POTASSIUM 3.5  --  3.8  --  4.3   CHLORIDE 106  --  104  --  106   CO2 26  --  28  --  27   BUN 26  --  13  --  20   CR 3.50*  --  2.23*  --  3.18*   GLC 76  --  115*  --  165*   FRANCES 7.1*  --  7.7*  --  7.3*   MAG 2.2  --  1.9 1.3*  --    PHOS 2.6 2.5 1.3*  --   --        CBC -   Recent Labs   Lab Test 05/28/19  0523 05/26/19  1126 05/25/19  0801   WBC 11.0 12.2* 9.4   HGB 8.8* 9.4* 9.4*    422 382       LFTs -   Recent Labs   Lab Test 05/23/19  0445 05/22/19  0335 05/21/19  1103 05/20/19  1942   ALKPHOS 100 102  --  125   BILITOTAL 0.4 0.1*  --   0.3   ALT 10 10  --  12   AST 18 16  --  21   PROTTOTAL 4.8* 5.0*  --  5.8*   ALBUMIN 1.2* 1.2* 1.3* 1.4*       Iron Panel -   Recent Labs   Lab Test 05/01/19  1320 02/16/18  0945 09/11/17  0928   IRON 48 46 73   IRONSAT 34 28 43   * 134 127         Imaging:  All imaging studies reviewed by me.     Current Medications:    ammonium lactate   Topical BID     aspirin  81 mg Oral Daily     atorvastatin  40 mg Oral Daily     diltiazem ER COATED BEADS  240 mg Oral Daily     docusate sodium  100 mg Oral BID     fluticasone  1 spray Both Nostrils Daily     fluticasone-vilanterol  1 puff Inhalation Daily     furosemide  60 mg Oral BID     levothyroxine  200 mcg Oral Daily     senna-docusate  1 tablet Oral BID    Or     senna-docusate  2 tablet Oral BID     sodium bicarbonate  650 mg Oral BID     sodium chloride (PF)  3 mL Intracatheter Q8H     tiotropium  18 mcg Inhalation Daily     vitamin D3  2,000 Units Oral Daily       Yola Sheffield MD

## 2019-05-29 NOTE — PLAN OF CARE
Pt A&O x 4.  Flat affect.  AVSS on RA, tele bradycardia.  Some increased SOB with activity, maintains O2sats. Denies pain or discomfort.  Phos replaced per protocol. Up with 1-assist and walker.  CXR done.  BG stable.  Good appetite.  Plan for dialysis in the morning and possible discharge tomorrow afternoon.  Will update provider with any changes.

## 2019-05-29 NOTE — PROGRESS NOTES
"Social Work Services Progress Note    Hospital Day: 9  Date of Initial Social Work Evaluation:  Not yet completed  Collaborated with:  Pt, granddaughter, medical team    Data:  Pt is a 73 year old female admitted to [facility] on 5/21/19.     Intervention:  SW was informed that pt and pt's granddaughter were interested in TCU placement instead of discharging home from [facility]. SW provided the family with a list of Medicare TCU options. Family requested SW to come back after lunch to give them time, as a family, to discuss.     Assessment:  Pt awake in bed with granddaughter in the room. Pt and family understanding of SW intervention.     Plan:    Anticipated Disposition:  Facility:  TCU    Barriers to d/c plan:  Placement    Follow Up:  SW will follow up with the family this afternoon regarding their decision for TCU placement options. Will continue to remain available in the meantime.    BROOKS Gil, SARABJIT  5B   Pager [phone]  Phone [phone]    Addendum 1:14pm: Met with the pt at bedside. Granddaughter was not present. When pt was asked if they had decided on TCU options, pt stated, \"I guess I am going home now. My granddaughter is taking me home.\" When asked where her granddaughter was the pt was unsure. SW reiterated the pt's plan to go home and pt became confused and stated, \"Well no one has told me that yet. I don't know what is going on.\" SW left the room at this point as pt did not seem to be understanding as to what is going on.     Call was placed to pt's son (Ki Jordan [phone]) who is listed as the pt's decision maker.The son stated the granddaughter should not be making these decisions and that he hasn't been updated as to what is going on. Son stated he will be coming to the hospital shortly and will be requesting to see the doctor. MD updated.     BROOKS Gil, MYCHALW  5B   Pager [phone]  Phone [phone]    Addendum 4:18pm: TCU referrals sent to " the followin) [facility]  2) [facility]  3) [facility]  4) [facility]  5) [facility]    Will continue to follow for possible discharge tomorrow.    JAME GilW, LSW  5B   Pager [phone]  Phone [phone]

## 2019-05-29 NOTE — PLAN OF CARE
Patient is alert and oriented x4, Patient is vitally stable on room air. The patient can be forgetful at times so I have the bed alarm on since she is standby assist. She has been pretty quiet in her room just watching TV. The denies pain and nausea. The patient did have a temp at the beginning of the shift of 100.1 so I gave her PRN tylenol, when I rechecked her temp it was staring to come down it was 99.0 the last time I checked. Continue to monitor and notify MD of nay changes.    Update: temp was still 99 at pm so I gave her another dose of tylenol.

## 2019-05-29 NOTE — PROGRESS NOTES
Care Coordinator Progress Note    Admission Date/Time:  5/20/2019  Attending MD:  Flakito Gao MD    Data  Chart reviewed, discussed with interdisciplinary team.   Patient was admitted for:    Generalized muscle weakness  Chest pain, unspecified type  Dyspnea, unspecified type  Pleural effusion  S/P coronary angiogram  Atypical chest pain  Hyperlipidemia LDL goal <100  Chronic obstructive pulmonary disease, unspecified COPD type (H)  Health Care Home.    Assessment  RNCC paged by OT to pt room.  OT states that pt is now agreeing to TCU.  RNCC and OT discussed advantages to TCU with pt.  Pt is willing to discuss with SW and her granddaughter, pt seemed to understand why TCU is recommended however at times appeared confused therefor RNCC phrased it multiple ways for improved understanding.  Granddaughter and pt to meet with SW shortly to discuss options.      Kandi Trejo, NICOLECC, BSN    [facility]    Medicine Group  [address] [place] [id number]    [email or web address]    Office: [phone] Pager: [phone]  To contact weekend RNCC, dial * [phone] and enter pager number [phone] at prompt. This pager can not be contacted by text page or outside line.

## 2019-05-30 VITALS
SYSTOLIC BLOOD PRESSURE: 179 MMHG | TEMPERATURE: 97.9 F | OXYGEN SATURATION: 97 % | BODY MASS INDEX: 24.52 KG/M2 | RESPIRATION RATE: 18 BRPM | HEART RATE: 71 BPM | WEIGHT: 142.86 LBS | DIASTOLIC BLOOD PRESSURE: 63 MMHG

## 2019-05-30 LAB
ANION GAP SERPL CALCULATED.3IONS-SCNC: 8 MMOL/L (ref 3–14)
BUN SERPL-MCNC: 23 MG/DL (ref 7–30)
CALCIUM SERPL-MCNC: 6.8 MG/DL (ref 8.5–10.1)
CHLORIDE SERPL-SCNC: 105 MMOL/L (ref 94–109)
CO2 SERPL-SCNC: 25 MMOL/L (ref 20–32)
CREAT SERPL-MCNC: 3.26 MG/DL (ref 0.52–1.04)
ERYTHROCYTE [DISTWIDTH] IN BLOOD BY AUTOMATED COUNT: 16.7 % (ref 10–15)
GFR SERPL CREATININE-BSD FRML MDRD: 13 ML/MIN/{1.73_M2}
GLUCOSE BLDC GLUCOMTR-MCNC: 120 MG/DL (ref 70–99)
GLUCOSE BLDC GLUCOMTR-MCNC: 90 MG/DL (ref 70–99)
GLUCOSE SERPL-MCNC: 105 MG/DL (ref 70–99)
HCT VFR BLD AUTO: 24.1 % (ref 35–47)
HGB BLD-MCNC: 7.3 G/DL (ref 11.7–15.7)
MAGNESIUM SERPL-MCNC: 1.7 MG/DL (ref 1.6–2.3)
MCH RBC QN AUTO: 28.6 PG (ref 26.5–33)
MCHC RBC AUTO-ENTMCNC: 30.3 G/DL (ref 31.5–36.5)
MCV RBC AUTO: 95 FL (ref 78–100)
PHOSPHATE SERPL-MCNC: 3.9 MG/DL (ref 2.5–4.5)
PLATELET # BLD AUTO: 332 10E9/L (ref 150–450)
POTASSIUM SERPL-SCNC: 3 MMOL/L (ref 3.4–5.3)
PROCALCITONIN SERPL-MCNC: 0.57 NG/ML
RBC # BLD AUTO: 2.55 10E12/L (ref 3.8–5.2)
SODIUM SERPL-SCNC: 139 MMOL/L (ref 133–144)
WBC # BLD AUTO: 7.8 10E9/L (ref 4–11)

## 2019-05-30 PROCEDURE — 25000132 ZZH RX MED GY IP 250 OP 250 PS 637: Performed by: PHYSICIAN ASSISTANT

## 2019-05-30 PROCEDURE — 00000146 ZZHCL STATISTIC GLUCOSE BY METER IP

## 2019-05-30 PROCEDURE — 83735 ASSAY OF MAGNESIUM: CPT | Performed by: PEDIATRICS

## 2019-05-30 PROCEDURE — 25000125 ZZHC RX 250: Performed by: PHYSICIAN ASSISTANT

## 2019-05-30 PROCEDURE — 84100 ASSAY OF PHOSPHORUS: CPT | Performed by: PEDIATRICS

## 2019-05-30 PROCEDURE — 84145 PROCALCITONIN (PCT): CPT | Performed by: PEDIATRICS

## 2019-05-30 PROCEDURE — 25000128 H RX IP 250 OP 636: Performed by: INTERNAL MEDICINE

## 2019-05-30 PROCEDURE — 80048 BASIC METABOLIC PNL TOTAL CA: CPT | Performed by: PEDIATRICS

## 2019-05-30 PROCEDURE — 63400005 ZZH RX 634: Performed by: INTERNAL MEDICINE

## 2019-05-30 PROCEDURE — 99207 ZZC APP CREDIT; MD BILLING SHARED VISIT: CPT | Performed by: PHYSICIAN ASSISTANT

## 2019-05-30 PROCEDURE — 85027 COMPLETE CBC AUTOMATED: CPT | Performed by: PEDIATRICS

## 2019-05-30 PROCEDURE — 90937 HEMODIALYSIS REPEATED EVAL: CPT

## 2019-05-30 PROCEDURE — 36415 COLL VENOUS BLD VENIPUNCTURE: CPT | Performed by: PEDIATRICS

## 2019-05-30 RX ORDER — DILTIAZEM HYDROCHLORIDE 240 MG/1
240 CAPSULE, COATED, EXTENDED RELEASE ORAL DAILY
DISCHARGE
Start: 2019-05-31 | End: 2019-05-30

## 2019-05-30 RX ORDER — OXYCODONE HYDROCHLORIDE 10 MG/1
5-10 TABLET ORAL EVERY 8 HOURS PRN
Qty: 5 TABLET | Refills: 0 | Status: SHIPPED | OUTPATIENT
Start: 2019-05-30 | End: 2019-06-10

## 2019-05-30 RX ORDER — DILTIAZEM HYDROCHLORIDE 240 MG/1
240 CAPSULE, COATED, EXTENDED RELEASE ORAL DAILY
Qty: 30 CAPSULE | Refills: 0 | Status: SHIPPED | OUTPATIENT
Start: 2019-05-31 | End: 2019-08-22

## 2019-05-30 RX ORDER — POTASSIUM CHLORIDE 20MEQ/15ML
20 LIQUID (ML) ORAL ONCE
Status: COMPLETED | OUTPATIENT
Start: 2019-05-30 | End: 2019-05-30

## 2019-05-30 RX ORDER — OXYCODONE HYDROCHLORIDE 10 MG/1
10 TABLET ORAL EVERY 8 HOURS PRN
Qty: 10 TABLET | Refills: 0 | Status: CANCELLED | OUTPATIENT
Start: 2019-05-30

## 2019-05-30 RX ORDER — HYDRALAZINE HYDROCHLORIDE 25 MG/1
25 TABLET, FILM COATED ORAL 2 TIMES DAILY
Status: DISCONTINUED | OUTPATIENT
Start: 2019-05-30 | End: 2019-05-30 | Stop reason: HOSPADM

## 2019-05-30 RX ORDER — DOXERCALCIFEROL 4 UG/2ML
2 INJECTION INTRAVENOUS
Status: COMPLETED | OUTPATIENT
Start: 2019-05-30 | End: 2019-05-30

## 2019-05-30 RX ADMIN — SODIUM BICARBONATE 650 MG TABLET 650 MG: at 12:00

## 2019-05-30 RX ADMIN — DILTIAZEM HYDROCHLORIDE 240 MG: 240 CAPSULE, COATED, EXTENDED RELEASE ORAL at 12:04

## 2019-05-30 RX ADMIN — POTASSIUM CHLORIDE 20 MEQ: 20 SOLUTION ORAL at 12:10

## 2019-05-30 RX ADMIN — LEVOTHYROXINE SODIUM 200 MCG: 100 TABLET ORAL at 12:02

## 2019-05-30 RX ADMIN — HYDRALAZINE HYDROCHLORIDE 25 MG: 25 TABLET ORAL at 13:39

## 2019-05-30 RX ADMIN — Medication: at 07:48

## 2019-05-30 RX ADMIN — DOXERCALCIFEROL 2 MCG: 4 INJECTION, SOLUTION INTRAVENOUS at 07:50

## 2019-05-30 RX ADMIN — ATORVASTATIN CALCIUM 40 MG: 40 TABLET, FILM COATED ORAL at 12:03

## 2019-05-30 RX ADMIN — Medication 2 G: at 13:39

## 2019-05-30 RX ADMIN — MELATONIN 2000 UNITS: at 12:03

## 2019-05-30 RX ADMIN — SODIUM CHLORIDE 300 ML: 9 INJECTION, SOLUTION INTRAVENOUS at 07:47

## 2019-05-30 RX ADMIN — FLUTICASONE PROPIONATE 1 SPRAY: 50 SPRAY, METERED NASAL at 12:06

## 2019-05-30 RX ADMIN — ACETAMINOPHEN 650 MG: 325 TABLET, FILM COATED ORAL at 02:17

## 2019-05-30 RX ADMIN — TIOTROPIUM BROMIDE 18 MCG: 18 CAPSULE ORAL; RESPIRATORY (INHALATION) at 12:02

## 2019-05-30 RX ADMIN — FUROSEMIDE 60 MG: 40 TABLET ORAL at 12:04

## 2019-05-30 RX ADMIN — FLUTICASONE FUROATE AND VILANTEROL TRIFENATATE 1 PUFF: 100; 25 POWDER RESPIRATORY (INHALATION) at 12:06

## 2019-05-30 RX ADMIN — EPOETIN ALFA 4000 UNITS: 10000 SOLUTION INTRAVENOUS; SUBCUTANEOUS at 11:21

## 2019-05-30 RX ADMIN — ASPIRIN 81 MG: 81 TABLET, COATED ORAL at 12:04

## 2019-05-30 RX ADMIN — SODIUM CHLORIDE 250 ML: 9 INJECTION, SOLUTION INTRAVENOUS at 07:48

## 2019-05-30 ASSESSMENT — ACTIVITIES OF DAILY LIVING (ADL)
ADLS_ACUITY_SCORE: 16

## 2019-05-30 NOTE — PLAN OF CARE
DISCHARGE                         No discharge date for patient encounter.  ----------------------------------------------------------------------------  Discharged to: Home  Via: private transportation  Accompanied by: Family   Discharge Instructions: renal diet, activity as tolerated, medications, follow up appointments, when to call the MD, aftercare instructions.  Prescriptions: To be filled by  [place] pharmacy; medication list reviewed & sent with pt  Follow Up Appointments: arranged; information given  Belongings: All sent with pt  IV: d/c'd  Telemetry: d/c'd  Pt exhibits understanding of above discharge instructions; all questions answered.    Discharge Paperwork: Signed, copied, and sent home with patient.

## 2019-05-30 NOTE — PROGRESS NOTES
HEMODIALYSIS TREATMENT NOTE    Date: 5/30/2019  Time: 8:56 AM    Data:  Pre Wt: 64.8kg    Desired Wt: 63. kg   Post Wt:63kg    Weight gain:   kg   Weight change:   kg  Ultrafiltration - Post Run Net Total Removed (mL): 1800 mL  Ultrafiltration - Post Run Net Total Gain (mL): 0 mL  Vascular Access Status: Yes, secured and visible  Dialyzer Rinse: Streaked, Light  Total Blood Volume Processed: 80.5  Total Dialysis (Treatment) Time: 3.5     Lab:   no    Interventions:Assessments  Pt dialyzed today for 3.5hrs on a K-4 Ca-3 bath via RCVC. 400 Qb throughout. 1.8kgs of UF to an EDW of 63kg. VSS throughout. See MAR for meds given. See Epic for further details. Report to primary RN post run.     Plan:    Per renal

## 2019-05-30 NOTE — DISCHARGE SUMMARY
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 She had recurrent SVT while admitted here and was evaluated by cardiology and EP. Needs work on nutrition status   EDW adjusted to 63-63.5kg    ESKD on IHD initiated IHD 5/2/19  Creatinine was 4.7 with GFR ~8 with >10g/g of albuminuria  Bx 2015 with DM nephropathy  Kidney function had been slowly declining  HD initiated 5/2/19   She is dialyzing at [facility] under care of Dr Liz on TTS schedule  Treatment time 3.5hrs , last Kt/V 1.05, EDW 67Kg--> she is now around 63-64kg with weight loss and poor appetite  -- Dialyzed based on her TTS schedule  -- since initiation she has lost >10 kgs and continues to have poor appetite      Electrolytes are stable  Mild hypocalcemia -- corrected wnl (improved since dialysis initiation)  -- will dialyze on 2mcg Hectorol for now.     Euvolemic and hx of HTN  Prior to dialysis initiation was on amlodipine 10 , furosemide 40mg bid , hydralazine 25mg BiD and metoprolol 25 daily  -- will continue on lasix to 80mg BiD and amlodipine 10mg   -- goal /80s     Anemia   ACD/inflamm  Iron stores are low Isat 18% with Ferritin 265  -- EPO dosed at 4000U with Venofer 100mg Qhd  while inpatient  -- She is on Mircera 100mg as OP Q2wk     MBD    Hypocalcemia improved from before and phos low at 2.7 last checked  Vit D 15  -- Continue on PO 2000U D3  -- On Hectorol 2mcg with IHD     DM 2 on Insulin     Chest Pain with mild troponin leak  PSVT   SVT over night , plans for cath today  -- ECHO today with concentric LVH and 55-60% EF and pulm HTN  -- EP evaluated for SVT and recommended Diltiazem for now              ICD-10-CM    1. Health Care Home Z76.89 Home care nursing referral     Home Care PT Referral for Hospital Discharge     Home Care OT Referral for Hospital Discharge     Glucose by meter     Glucose by meter     Glucose by meter     Glucose by meter     Glucose by meter     Glucose by meter     Calcium ionized     CBC with platelets     Basic metabolic panel     Magnesium     Phosphorus     Glucose by meter     Glucose by meter     Glucose by meter     Glucose by meter     CRP inflammation     CRP inflammation     Procalcitonin     Procalcitonin     Glucose by meter     Glucose by meter     Glucose by meter     Glucose by meter     Phosphorus     Procalcitonin     Basic metabolic panel     CBC with platelets     Glucose by meter     Glucose by meter     Glucose by meter     Glucose by meter     CANCELED: Procalcitonin     CANCELED: CRP inflammation   2. Generalized muscle weakness M62.81 Troponin II + III     Troponin II + III     TSH     TSH     CBC with platelets differential     Renal panel     Sputum Culture Aerobic Bacterial     Gram stain     Procalcitonin     Procalcitonin     Magnesium     Troponin I     Magnesium     Magnesium     CBC with platelets     Heparin 10a Level     CBC with platelets     Comprehensive metabolic panel     Phosphorus     Magnesium     Glucose by meter     Glucose by meter     INR     INR     Heparin 10a Level     AFB Stain Non Blood     AFB Culture Non Blood     ABO/Rh type and screen     Blood component     Blood component     Glucose by meter      Glucose by meter     Heparin Xa (10a) Level     CBC with platelets     Comprehensive metabolic panel     Phosphorus     Glucose by meter     Glucose by meter     Glucose by meter     Glucose by meter     CANCELED: TSH     CANCELED: Procalcitonin     CANCELED: Magnesium     CANCELED: Magnesium     CANCELED: AFB Culture Non Blood     CANCELED: AFB Culture Non Blood     CANCELED: AFB Culture Non Blood     CANCELED: INR     CANCELED: Heparin Xa (10a) Level   3. Chest pain, unspecified type R07.9    4. Dyspnea, unspecified type R06.00    5. Pleural effusion J90    6. S/P coronary angiogram Z98.890 Follow-Up with Cardiac Advanced Practice Provider     Activated clotting time POCT     Activated clotting time POCT     CBC with platelets     Basic metabolic panel     Glucose by meter     Glucose by meter     Lactic acid whole blood     Lactic acid whole blood     Glucose by meter     Glucose by meter     Glucose by meter     Glucose by meter     CBC with platelets     Basic metabolic panel     Magnesium     Glucose by meter     Glucose by meter     CBC with platelets     Basic metabolic panel     Magnesium     Magnesium     Phosphorus     Phosphorus     Glucose by meter     Glucose by meter     Phosphorus     Glucose by meter     Glucose by meter     Glucose by meter     Glucose by meter     Magnesium     CBC with platelets     Basic metabolic panel     Phosphorus     Glucose by meter     Glucose by meter     CANCELED: Lactic acid level STAT for sepsis protocol     CANCELED: Magnesium     CANCELED: Phosphorus   7. Atypical chest pain R07.89 Home care nursing referral     Home Care PT Referral for Hospital Discharge     Home Care OT Referral for Hospital Discharge   8. Hyperlipidemia LDL goal <100 E78.5 Home care nursing referral     Home Care PT Referral for Hospital Discharge     Home Care OT Referral for Hospital Discharge   9. Chronic obstructive pulmonary disease, unspecified COPD type (H) J44.9 Home care nursing  referral     Home Care PT Referral for Hospital Discharge     Home Care OT Referral for Hospital Discharge       [X] Resume all previous dialysis orders with exception as noted below    New Orders (if not applicable put NA):  Estimated Dry Weight 63-64   Dialysis Duration NA   Dialysis Access NA   Antibiotics (dose per dialysis, end date) NA           Labs to be drawn at dialysis NA   Other major changes to dialysis prescription (e.g. Dialysate bath, heparin, blood flow rate, etc)   Started on 2mcg Hectorol   Medication changes (also fax the unit a copy of the discharge summary)         NA     Name of physician completing this form: Yola Sheffield MD, MD Michelle Laurent MD

## 2019-05-30 NOTE — PLAN OF CARE
D: Quiet all pm, tolerated meal well, vitals stable, no change in tele reading. Encouraged her to sit in chair more. Up to bathroom with walker and stand by assist.IV saline locked, patient spoke with M.D. And  about possible discharge tomorrow and returning home vs rehab. Amari I and O closely, comfort and activity tolerance.

## 2019-05-30 NOTE — PLAN OF CARE
A:  patient having left shoulder pain this morning.  Pain med given and hot pack with relief.  Ambulated to bathroom with standby assist times one. Using call light appropriately.  Plan for discharge today after dialysis.  R:  continue to monitor and treat per plan of care.

## 2019-05-30 NOTE — PLAN OF CARE
Physical Therapy Discharge Summary    Reason for therapy discharge:    Discharged to transitional care facility.    Progress towards therapy goal(s). See goals on Care Plan in [facility] electronic health record for goal details.  Goals partially met.  Barriers to achieving goals:   discharge from facility.    Therapy recommendation(s):    Continued therapy is recommended.  Rationale/Recommendations:  strength, activity tolerance and safety with functional mobility.

## 2019-05-30 NOTE — DISCHARGE SUMMARY
[facility], [place]  Hospitalist Discharge Summary       Date of Admission:  5/20/2019  Date of Discharge:  5/30/2019  Discharging Provider: MATT Ron/Dr. Gao  Discharge Team: Hospitalist Service, Gold 7    Discharge Diagnoses     Exertional chest pain and dyspnea    Symptomatic paroxysmal SVT and non-sustained VT    Acute hypoxic respiratory failure    Leukocytosis    Occluded L subclavian stent    HTN    ESRD on HS    Hx of solitary kidney    Hypothyroidism     Type II DM     COPD     Hx LTBI with positive quantiferon gold     Chronic anemia     Tobacco abuse     Follow-ups Needed After Discharge   Follow-up Appointments     Follow Up and recommended labs and tests      Follow up with [facility] physician.  The following labs/tests are   recommended: CBC, BMP. Repeat TFT's in 4-6 weeks  Follow up with interventional radiology for Occluded L subclavian stent,   referral placed on discharge         Follow up with cardiology within one month with consideration of halter monitor        Unresulted Labs Ordered in the Past 30 Days of this Admission     Date and Time Order Name Status Description    5/22/2019 1202 AFB Culture Non Blood Preliminary       These results will be followed up by PCP    Discharge Disposition   Discharged to nursing home  Condition at discharge: Stable    Hospital Course   Kim Anne is a 73 year old female admitted on 5/20/2019. 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 She is admitted for evaluation of exertional chest pain and dyspnea.      # Exertional chest pain and dyspnea:  Initial concern for unstable angina given slightly elevated, but stable, troponin. Echo 5/21 showed  normal LV function (EF 60-65%), no wall motion abnormalities, normal RV function, and elevated PA pressures c/w pulmonary hypertension. Cardiology consulted, s/p coronary angio 5/23 which showed non-obstructive CAD. No intervention needed. SVT noted during heart cath with associated symptom of chest tightness and hypotension. Patient reported to have SVT/tachycarida previously during HD runs. Cardiology suspects presenting symptoms d/t SVT. EP cardiology was consulted, see below.     # Symptomatic paroxysmal SVT and non-sustained VT:  See above. Noted to have frequent runs of SVT during heart cath 5/23 with associated chest tightness and hypotension. Cardiology suspect presenting symptoms due to arrhythmia. Continues to have brief paroxysms SVT which resolved spontaneously. Patient was started on short acting diltiazem which was transitioned to long acting. PTA metoprolol and amlodipine were discontinued. Brief episodes of paroxysmal SVT decreased in frequency. BP remained stable. Minimally symptomatic with episodes at this time.   - Continue Cardizem long acting today 240 mg  - Follow up with EP cardiology for ongoing management and consideration of halter monitor to determine arrhythmia burden. Cardiology will arrange.      # Acute hypoxic respiratory failure: Etiology multifactorial, with SVT, and bilateral pleural effusions contributing with poor reserve 2/2 COPD. Symptoms improved with better HR control. Pleural effusions stable, currently on room air. Lungs course on exam. Sputum culture 5/21 grew Hemophilus influenzae and Moraxella catarrhalis but no clinical signs/symptoms of active pneumonia, therefore suspect colonization. CT chest 5/25 with scattered groundglass opacities. Continues to do well on RA. No desaturations with PT/OT.  WBC mildly elevated 5/26, down trend today, continues to have ongoing/chronic cough. Procalcitonin stable and patient remains afebrile, low suspicion for infection.     #  Occluded L subclavian stent:  Asymmetric BP noted 5/24 with SBP 70's on left and 120's on right. Delayed and diminished L radial pulse also noted on exam. LUE arterial US negative for focal stenosis or occlusion. CTA 5/25 negative for dissection but shows occlusion of previously placed L subclavian stent. Case discussed with IR - appears to have good collaterals. Currently no ischemic symptoms, therefore no indication for inpatient intervention.   - Check blood pressures on R arm for most accurate reading  - Follow up with IR for definitive treatment with angioplasty    # HTN:  PTA amlodipine, metoprolol and lasix. Antihypertensives were held due to low BP secondary to SVT. With control of SVT after initiation of diltiazem as above, BP improved. Amlodipine was discontinued given addition of diltiazem. Metoprolol held on discharge. Hydralazine and lasix resumed prior to discharge.   - Please check BP on right arm only     # ESRD on HD; Hx solitary kidney:  Secondary to diabetic nephropathy (biopsy proven). HD initiated 5/2/19, currently TTS via RIJ tunneled catheter. Nephrology consulted and patient was continued on dialysis.   - Continue HD q TTS   - Daily weights    # Hypothyroidism:  TSH 15 on admission, likely d/t medication non-compliance. Prior TSH 1.12 on current dose of levothyroxine, indicating adequate replacement when taking regularly.    - Continue levothyroxine 200mcg daily  - Repeat TFT's in 4-6 weeks    # Type II DM:  Diet controlled. A1C 5.2 on 5/1/19. Adequately controlled on diet alone.    Resolved hospital problems:   # Leukocytosis: Mild elevation of wbc to 12.2 5/26, with normalization the following day. Etiology unclear. Procalcitonin stable, CRP down trending, patient had low grade temperature 5/28 which resolved with no intervention.   - Repeat CBC within one week       Stable chronic problems:    # COPD:  No wheezing on exam to suggest acute exacerbation. Cont breo ellipta, spiriva, and  duoneb QID.   # Hx LTBI with positive quantiferon gold:  Reportedly treated in 1970's. No history of new exposures. CXR negative for evidence of active TB. ID consulted. No indication for further evaluation or airborne isolation d/t positive quantiferon gold alone. Single sputum sent for AFB stain/culture negative.    # Chronic anemia:  Anemia of chronic renal disease and iron deficiency. Hgb low but remained stable. Patient received EPO and venofer while inpatient. She is on Mircera 100 mg every 2 weeks as outpatient.  # Tobacco abuse:  Encouraged cessation.      Consultations This Hospital Stay   NEPHROLOGY ESRD ADULT IP CONSULT  NEPHROLOGY ESRD ADULT IP CONSULT  CARDIOLOGY GENERAL ADULT IP CONSULT  INFECTIOUS DISEASE GENERAL ADULT IP CONSULT  WOUND OSTOMY CONTINENCE NURSE  IP CONSULT  VASCULAR ACCESS CARE ADULT IP CONSULT  CARDIOLOGY ELECTROPHYSIOLOGY (EP) IP CONSULT  MEDICATION HISTORY IP PHARMACY CONSULT  PHYSICAL THERAPY ADULT IP CONSULT  OCCUPATIONAL THERAPY ADULT IP CONSULT  OCCUPATIONAL THERAPY ADULT IP CONSULT  PHYSICAL THERAPY ADULT IP CONSULT  OCCUPATIONAL THERAPY ADULT IP CONSULT    Code Status   Full Code    Time Spent on this Encounter   I, Kandi Rehman, personally saw the patient today and spent greater than 30 minutes discharging this patient.       MATT Ron  [facility], [place]  ______________________________________________________________________    Physical Exam   Vital Signs: Temp: 97.9  F (36.6  C) Temp src: Oral BP: (S) 179/63(PA notified) Pulse: 71 Heart Rate: 73 Resp: 18 SpO2: 97 % O2 Device: None (Room air)    Weight: 142 lbs 13.73 oz  GENERAL: Alert and oriented x 3. NAD. Lying in bed on dialysis.    HEENT: Anicteric sclera. Mucous membranes moist and without lesions.   CV: RRR. S1, S2. IV/VI systolic murmurs appreciated.   RESPIRATORY: Effort normal on RA. Lung sounds diminished at bilateral bases otherwise CTAB with no wheezing, rales,  rhonchi.   GI: Abdomen soft and non distended, bowel sounds present. No tenderness, rebound, guarding.   MUSCULOSKELETAL: No joint swelling or tenderness.    NEUROLOGICAL: No focal deficits. Moves all extremities.  EXTREMITIES: No peripheral edema. Intact bilateral pedal pulses.   SKIN: No jaundice. No rashes.          Primary Care Physician   Ailyn Nieves    Discharge Orders      Medication Therapy Management Referral      Follow-Up with Cardiac Advanced Practice Provider      Home care nursing referral      Home Care PT Referral for Hospital Discharge      Home Care OT Referral for Hospital Discharge      IR REFERRAL      MD face to face encounter    Documentation of Face to Face and Certification for Home Health Services    I certify that patient: Kim Anne is under my care and that I, or a nurse practitioner or physician's assistant working with me, had a face-to-face encounter that meets the physician face-to-face encounter requirements with this patient on: 5/28/2019.    This encounter with the patient was in whole, or in part, for the following medical condition, which is the primary reason for home health care: weakness, fatigue.    I certify that, based on my findings, the following services are medically necessary home health services: Nursing, Occupational Therapy and Physical Therapy.    My clinical findings support the need for the above services because: Nurse is needed: To assess for physical improvement and tolerance of them after changes in medications or other medical regimen.., Occupational Therapy Services are needed to assess and treat cognitive ability and address ADL safety due to impairment in weakness, fatigue. and Physical Therapy Services are needed to assess and treat the following functional impairments: weakness.    Based on the above findings. I certify that this patient is confined to the home and needs intermittent skilled nursing care, physical therapy and/or  speech therapy.  The patient is under my care, and I have initiated the establishment of the plan of care.  This patient will be followed by a physician who will periodically review the plan of care.  Physician/Provider to provide follow up care: Ailyn Nieves    Attending hospital physician (the Medicare certified [place] provider): Flakito Gao MD  Physician Signature: See electronic signature associated with these discharge orders.  Date: 5/28/2019     Daily weights    Call Provider for weight gain of more than 2 pounds per day or 5 pounds per week.     Activity - Up with nursing assistance     Reason for your hospital stay    Dear Kim Anne    Your were hospitalized at [facility] with abnormal heart rhythm and treated with diltiazem.  Over your hospitalization your heart rate improved and today you are ready to be discharged to rehab.  If you continue dilitazem therapy you should continue to improve but if you develop fever, shortness of breath, light headedness, chest pain or increased pain please seek medical attention.    We are suggesting the following medication changes:  - Additional of diltiazem 240 mg daily   - Discontinuation of metoprolol   - Discontinuation of amlodipine       Please set up an appointment with:  -Follow up with rehab facility physician within one week with repeat labs including BMP and CBC  -Follow up with nephrology   -Follow up with cardiology within one month with consideration of halter monitor   -Follow up with interventional radiology for Occluded L subclavian stent, referral placed on discharge      It was a pleasure meeting with you today. Thank you for allowing me and my team the privilege of caring for you today. You are the reason we are here, and I truly hope we provided you with the excellent service you deserve. Please let us know if there is anything else we can do for you so that we can be sure you are leaving completely  satisfied with your care experience.    Your hospital unit at the time of discharge is 5B so if you have any questions please call the hospital at [phone] and ask to talk to a nurse on 5B.    Take care!  Kandi Rehman PA-C  Hospitalist Service     Follow Up and recommended labs and tests    Follow up with long term physician.  The following labs/tests are recommended: CBC, BMP. Repeat TFT's in 4-6 weeks  Follow up with interventional radiology for Occluded L subclavian stent, referral placed on discharge     Full Code     Physical Therapy Adult Consult    Evaluate and treat as clinically indicated.    Reason:  Deconditioning     Occupational Therapy Adult Consult    Evaluate and treat as clinically indicated.    Reason:  Deconditioning     Fall precautions     Advance Diet as Tolerated    Follow this diet upon discharge:   Dialysis Diet       Significant Results and Procedures   Most Recent 3 CBC's:  Recent Labs   Lab Test 05/30/19 0445 05/29/19  0452 05/28/19  0523   WBC 7.8 6.2 11.0   HGB 7.3* 7.6* 8.8*   MCV 95 97 97    334 359     Most Recent 3 BMP's:  Recent Labs   Lab Test 05/30/19  0445 05/29/19  0452 05/28/19  0523    138 139   POTASSIUM 3.0* 3.4 3.5   CHLORIDE 105 105 106   CO2 25 29 26   BUN 23 14 26   CR 3.26* 2.47* 3.50*   ANIONGAP 8 4 7   FRANCES 6.8* 7.2* 7.1*   * 81 76     Most Recent 2 LFT's:  Recent Labs   Lab Test 05/23/19  0445 05/22/19  0335   AST 18 16   ALT 10 10   ALKPHOS 100 102   BILITOTAL 0.4 0.1*     Most Recent 3 INR's:  Recent Labs   Lab Test 05/22/19  0335 05/01/19  1748 10/25/17  0639   INR 0.99 0.95 0.84*   ,   Results for orders placed or performed during the hospital encounter of 05/20/19   Chest XR,  PA & LAT    Narrative    CHEST TWO VIEWS    5/20/2019 8:31 PM     HISTORY: Dyspnea.    COMPARISON: 5/2/2019.      Impression    IMPRESSION: Interval placement of a large-bore right jugular catheter  with its tip at the cavoatrial junction. Interval significant  increase  in size of bilateral pleural effusions with associated  infiltrate/atelectasis. The upper lung zones remain relatively clear  and the heart size is normal and unchanged.       CHRISTINE SALCEDO MD   POC US ECHO LIMITED    Impression    Limited Bedside Cardiac Ultrasound, performed and interpreted by me.   Indication: Shortness of Breath.  Parasternal long axis, parasternal short axis, apical 4 chamber, subcostal and BL lung views were acquired.   Image quality was satisfactory.    Findings:    Global left ventricular function appears intact.  Chambers do not appear dilated.  There is no evidence of free fluid within the pericardium.  No B lines  Large bilateral pleural effusions    IMPRESSION: Grossly normal left ventricular function and chamber size.  No pericardial effusion.  Large pleural effusions bilaterally.     XR Chest 2 Views    Narrative    Exam: XR CHEST 2 VW, 5/23/2019 9:19 PM    Indication: cough and hypoxia    Comparison: Chest x-ray 5/20/2018    Findings:   AP and lateral views of the chest. Right IJ dialysis catheter tip  projecting over the low SVC. Cardiac silhouette is normal in size. No  pneumothorax. Moderate right and small left pleural effusions with  overlying opacities. The upper abdomen is unremarkable.      Impression    Impression:   Moderate right and small left pleural effusions, increased from the  prior radiograph, with overlying atelectasis/consolidation.    I have personally reviewed the examination and initial interpretation  and I agree with the findings.    WILL CAMILO MD   US Upper Extremity Arterial Duplex Left    Narrative    Exam: US UPPER EXTREMITY ARTERIAL DUPLEX LEFT, 5/24/2019 6:27 PM    Indication: Asymmetric BP and pulses in the upper extremities; please  evaluate left upper extremity and subclavian for possible clot    Comparison: None    Findings:   Normal left upper extremity duplex sonographic evaluation of the  subclavian, axillary, brachial, radial and  ulnar arteries.    The systolic velocities of 90, 96, 74, 81, 53, 25, 30, 30, 37 within  the subclavian, axillary, upper brachial, mid brachial, distal  brachial, proximal radial, proximal ulna, distal radial and distal  ulnar arteries respectively.      Impression    Impression: Left upper extremity arterial sonogram negative for  thrombosis.    I have personally reviewed the examination and initial interpretation  and I agree with the findings.    RAPHAEL BUTLER MD   CTA Chest with Contrast     Value    Radiologist flags (Urgent)     Occluded left subclavian stent, collateral flow likely    Narrative    Study: CTA CHEST WITH CONTRAST 5/25/2019 11:22 AM     History:   Unequal pressures in arms, otherwise asymptomatic.  Eval  for dissection.  Pt has contrast allergy, will get steroids pre-scan..     Comparison: 5/23/2019    Technique:  Helical CT images from the thoracic inlet through the  upper abdomen were obtained with intravenous contrast, with axial and  coronal reformations. Images are displayed at 1 and 5 mm intervals.  Images reviewed in lung, soft tissue, and bone windows.     Contrast dose: iopamidol (ISOVUE-370) solution 125 mL    Radiation Dose (DLP): 208 mGy*cm    Findings:     There is adequate contrast bolus in the study. No evidence of right  heart strain. No pulmonary embolus. The heart size is normal. No  pericardial effusion. Mild to moderate coronary artery atherosclerotic  calcification.    Occlusion of the left subclavian artery stent which appears to fill in  a retrograde fashion via the left vertebral artery. Length of the  occlusion is as much as 2 cm although the distal extent of occlusion  is difficult to delineate. The remainder of the great vessels are  patent. Aorta is normal in caliber with mild to moderate  atherosclerotic disease. No aortic dissection or other acute aortic  process. Limited visualization of the celiac access and SMA are  patent.     The central tracheobronchial  tree is patent. Moderate to large, right  greater than left, pleural effusions with overlying atelectasis versus  consolidation. Scattered groundglass opacities, most prominent in the  right lower lobe. Scattered micronodules throughout the lung apices  particularly on the right, similar to 5/26/2016. No suspicious  pulmonary nodule. Thyroid homogeneously enhances. Mildly prominent  mediastinal and hilar lymph nodes, increased from 5/26/2016, for  example 9 mm short axis diameter mediastinal node (series 5 image 26).      Limited visualization of the abdomen reveal no acute pathology. No  suspicious bony or soft tissue lesions.       Impression    IMPRESSION:     1. Occlusion of the left subclavian artery stent, with filling of the  distal subclavian artery likely due to collateral retrograde flow from  the vertebral artery. This could potentially cause subclavian steal  symptoms.   2. No evidence of pulmonary embolism or aortic dissection.  3. Moderate to large bilateral right greater than left pleural  effusions and associated atelectasis.  4. Scattered groundglass opacities the lungs may represent infectious  process.  5. Scattered micronodules in both lung apices similar to 5/26/2018.    [Urgent Result: Occluded left subclavian stent, collateral flow likely  provided by left vertebral artery. Descending aorta mural flap    Finding was identified on 5/25/2019 11:52 AM.     Dr. Porras was contacted by Dr. Jerry Herrera at 5/25/2019 11:57 AM and  verbalized understanding of the urgent finding.     I have personally reviewed the examination and initial interpretation  and I agree with the findings.    CLAUDIA ROGER MD   XR Chest 2 Views    Narrative    XR CHEST 2 VW  5/29/2019 1:51 PM      HISTORY: cough, fever    COMPARISON: CT chest 5/25/2019, chest radiograph 5/20/2019, 5/22/2019.    FINDINGS: PA and lateral views of the chest. Right double-lumen  central venous catheter tip projects over the right atrium.  Improved  bilateral pleural effusions since prior. Bibasilar opacities. No  pneumothorax. No acute osseous or abdominal abnormality.      Impression    IMPRESSION: Improved bilateral pleural effusions, residual basilar  opacities likely atelectasis versus developing consolidation.    I have personally reviewed the examination and initial interpretation  and I agree with the findings.    WILL CAMILO MD     *Note: Due to a large number of results and/or encounters for the requested time period, some results have not been displayed. A complete set of results can be found in Results Review.       Discharge Medications   Current Discharge Medication List      START taking these medications    Details   diltiazem ER COATED BEADS (CARDIZEM CD/CARTIA XT) 240 MG 24 hr capsule Take 1 capsule (240 mg) by mouth daily  Qty: 30 capsule, Refills: 0    Associated Diagnoses: SVT (supraventricular tachycardia) (H)         CONTINUE these medications which have CHANGED    Details   oxyCODONE IR (ROXICODONE) 10 MG tablet Take 0.5-1 tablets (5-10 mg) by mouth every 8 hours as needed for moderate to severe pain  Qty: 5 tablet, Refills: 0    Associated Diagnoses: Chronic low back pain without sciatica, unspecified back pain laterality         CONTINUE these medications which have NOT CHANGED    Details   acetaminophen (TYLENOL) 325 MG tablet Take 2 tablets (650 mg) by mouth every 4 hours as needed for mild pain    Associated Diagnoses: CKD (chronic kidney disease) stage 4, GFR 15-29 ml/min (H)      albuterol (PROAIR HFA/PROVENTIL HFA/VENTOLIN HFA) 108 (90 Base) MCG/ACT inhaler Inhale 2 puffs into the lungs every 6 hours as needed for shortness of breath / dyspnea or wheezing  Qty: 18 g, Refills: 3    Associated Diagnoses: Chronic obstructive pulmonary disease, unspecified COPD type (H)      Alcohol Swabs (SM ALCOHOL PREP) 70 % PADS Externally apply 1 pad topically 4 times daily  Qty: 100 each, Refills: 11    Associated Diagnoses: Type 2  diabetes mellitus without complication, with long-term current use of insulin (H)      ammonium lactate (LAC-HYDRIN) 12 % external lotion Apply topically 2 times daily  Qty: 225 g, Refills: 0    Associated Diagnoses: Dry skin      ASPIR-LOW 81 MG EC tablet Take 1 tablet (81 mg) by mouth daily  Qty: 90 tablet, Refills: 3    Associated Diagnoses: History of MI (myocardial infarction); Essential hypertension, benign      atorvastatin (LIPITOR) 40 MG tablet Take 1 tablet (40 mg) by mouth daily  Qty: 90 tablet, Refills: 1    Associated Diagnoses: Hyperlipidemia LDL goal <100      blood glucose monitoring (FREESTYLE LITE) test strip TEST BLOOD SUGAR TWO TIMES A DAY  Qty: 50 strip, Refills: 12    Associated Diagnoses: Type 2 diabetes mellitus without complication, with long-term current use of insulin (H)      blood glucose monitoring (FREESTYLE) lancets Test BS two times daily as directed  Qty: 100 each, Refills: 1    Associated Diagnoses: Type 2 diabetes mellitus without complication, with long-term current use of insulin (H)      Blood Pressure Monitoring (BLOOD PRESSURE CUFF) MISC 1 each 2 times daily  Qty: 1 each, Refills: 0    Associated Diagnoses: Chronic kidney disease, stage 4 (severe) (H)      Cholecalciferol (VITAMIN D) 2000 units tablet Take 2,000 Units by mouth daily  Qty: 90 tablet, Refills: 3    Associated Diagnoses: Vitamin D deficiency disease      docusate sodium (COLACE) 100 MG capsule Take 1 capsule (100 mg) by mouth 2 times daily  Qty: 60 capsule, Refills: 4    Associated Diagnoses: Constipation, unspecified constipation type      Emollient (EUCERIN CALMING DAILY MOIST) CREA Externally apply 1 dose. topically daily  Qty: 1 Tube, Refills: 12    Associated Diagnoses: Type II or unspecified type diabetes mellitus with neurological manifestations, not stated as uncontrolled(250.60) (H)      fluticasone (FLONASE) 50 MCG/ACT nasal spray Spray 1 spray into both nostrils daily  Qty: 9.9 mL, Refills: 1     Associated Diagnoses: Rhinorrhea      furosemide (LASIX) 20 MG tablet Take 3 tablets (60 mg) by mouth 2 times daily  Qty: 180 tablet, Refills: 0    Associated Diagnoses: CKD (chronic kidney disease) stage 4, GFR 15-29 ml/min (H)      hydrALAZINE (APRESOLINE) 25 MG tablet Take 1 tablet (25 mg) by mouth 2 times daily  Qty: 30 tablet, Refills: 3      hydrOXYzine (ATARAX) 10 MG tablet Take 1 tablet (10 mg) by mouth 3 times daily as needed for itching  Qty: 30 tablet, Refills: 0    Associated Diagnoses: CKD (chronic kidney disease) stage 4, GFR 15-29 ml/min (H)      levothyroxine (SYNTHROID/LEVOTHROID) 200 MCG tablet Take 1 tablet (200 mcg) by mouth daily  Qty: 90 tablet, Refills: 1    Associated Diagnoses: Other specified hypothyroidism      mometasone-formoterol (DULERA) 100-5 MCG/ACT inhaler Inhale 2 puffs into the lungs 2 times daily  Qty: 1 Inhaler, Refills: 1    Associated Diagnoses: Chronic bronchitis, unspecified chronic bronchitis type (H)      nitroGLYcerin (NITROSTAT) 0.4 MG sublingual tablet Place 1 tablet (0.4 mg) under the tongue every 5 minutes as needed for chest pain  Qty: 25 tablet, Refills: 0    Associated Diagnoses: History of MI (myocardial infarction)      nystatin (MYCOSTATIN) 787389 UNIT/GM POWD Apply 1 g topically 3 times daily as needed  Qty: 30 g, Refills: 1    Associated Diagnoses: Groin rash      !! order for DME Equipment being ordered: Ensure  Qty: 6 Can, Refills: 3    Associated Diagnoses: CKD (chronic kidney disease) stage 5, GFR less than 15 ml/min (H)      !! order for DME Equipment being ordered: cane  Qty: 1 each, Refills: 0    Associated Diagnoses: Non-compliant patient      !! order for DME Equipment being ordered: Diabetic Shoes  Qty: 1 each, Refills: 0    Associated Diagnoses: Type 2 diabetes mellitus with stage 5 chronic kidney disease not on chronic dialysis, without long-term current use of insulin (H)      !! order for DME Equipment being ordered: two pairs moderate knee high  support hose  Qty: 2 Device, Refills: 1    Associated Diagnoses: Edema, unspecified type      !! order for DME Equipment being ordered: Compression socks.  Strength:15-20 mmHg  Qty: 2 each, Refills: 0    Associated Diagnoses: Localized edema      !! order for DME One wheeled walker with seat and brakes and basket  Qty: 1 Device, Refills: 0    Associated Diagnoses: Risk for falls      polyethylene glycol (MIRALAX) powder Take 17 g (1 capful) by mouth daily as needed for constipation  Qty: 510 g, Refills: 2    Associated Diagnoses: Slow transit constipation      sodium bicarbonate 325 MG tablet Take 2 tablets (650 mg) by mouth 2 times daily  Qty: 360 tablet, Refills: 3    Associated Diagnoses: Acidosis; CKD (chronic kidney disease) stage 4, GFR 15-29 ml/min (H)      tiotropium (SPIRIVA HANDIHALER) 18 MCG inhaled capsule Inhale contents of one capsule daily.  Qty: 90 capsule, Refills: 3    Associated Diagnoses: Pulmonary emphysema, unspecified emphysema type (H)       !! - Potential duplicate medications found. Please discuss with provider.      STOP taking these medications       amLODIPine (NORVASC) 10 MG tablet Comments:   Reason for Stopping:         metoprolol succinate ER (TOPROL-XL) 25 MG 24 hr tablet Comments:   Reason for Stopping:             Allergies   Allergies   Allergen Reactions     Contrast Dye Hives and Itching     Clonidine      She had as IP and thinks it made her itchy     Diatrizoate Other (See Comments)     Diltiazem      Severe bradycardia     Iodine-131      Attestation:  This patient has been seen and evaluated by me on day of discharge.      Discharge plan was discussed with the PA and I agree with the findings and plan in this note.   Plan was reviewed with the patient and orders reviewed.  I hav made the STRONG recommendation to the patient that she go to a TCU to work on therapies, but she adamantly refuses. She will discontinue home with recommendation for close follow up. I have asked  her NOT TTO DRIVE until she is evaluated by her PCP.     Time spent on discharge : 45 minutes.    Flakito Gao MD  DOS: 5/30/2019

## 2019-05-30 NOTE — PROGRESS NOTES
Social Work Services Discharge Note      Patient Name:  Kim Anne     Anticipated Discharge Date:  5/30/19    Discharge Disposition:   TCU:  [facility] [address] [place] [address]  Ph: [phone]    Following MD:  Flakito Gao MD     Pre-Admission Screening (PAS) online form has been completed.  The Level of Care (LOC) is:  Determined  Confirmation Code is:  [id number]  Patient/caregiver informed of referral to [facility] for Pre-Admission Screening for skilled nursing facility (SNF) placement and to expect a phone call post discharge from SNF.     Additional Services/Equipment Arranged:  W/C van ride set up for 3:30pm      Patient / Family response to discharge plan:  Agreeable     Persons notified of above discharge plan:  MD, Bedside RN, pt, pt's sonBatool (RN w/ [facility]  [phone]), pt's dialysis center, TCU facility.     Staff Discharge Instructions:  SW will fax discharge orders and signed hard scripts for any controlled substances.  Please print a packet and send with patient.     CTS Handoff completed:  NO    Medicare Notice of Rights provided to the patient/family:  YES    Pt is a 73 year old female admitted to [facility] on 5/21/19. Pt will be discharging to [facility] at 3:30pm via w/c ride. [facility]  admissions phone: [phone].    BROOKS Gil, SARABJIT  5B   Pager [phone]  Phone [phone]    Addendum 2:37pm: Discharge orders & notes faxed to [facility] and [facility] (P: [phone]; F: [phone])    BROOKS Gil, SARABJIT  5B   Pager [phone]  Phone [phone]    Addendum 4:04pm: Pt has decided to discharge back to her home. Pt has this right, however there is concern regarding pt's safety. There has been concern for VA issues at home due to family. VA report has been completed. Pt will be receiving a ride from her cousin, Timi (ph: [phone]) once discharge orders are in.      [facility] and medical team has been updated.

## 2019-05-30 NOTE — PLAN OF CARE
Neuro: A&Ox4. Forgetful  Cardiac: SR, no episodes of SVT. BP elevated, PA notified. Restarted hydralazine, will monitor BP.   Respiratory: Sating > 92% on RA.  GI/: Adequate urine output, pt dialyzed this morning (1.8kg removed). No BM today.  Diet/appetite: Tolerating renal diet. Eating well.  Activity:  Assist of 1, up to bathroom with walker and assist of 1.  Pain: denies  Skin: No new deficits noted.  LDA's: PIV, dialysis CVC    Plan: Continue with POC. Notify primary team with changes. Plan for pt to discharge to [facility] TCU at 3:30 PM via w/c transport.

## 2019-05-30 NOTE — PROGRESS NOTES
[facility]  Met with pt to discuss plans for HC.  Pt to be discharged home  5/30 in the evening and has agreed to have [facility] follow with services of SN, PT and OT. Patient care support center processing referral.  Pt verbalized understanding that initial visit is scheduled for  1 to 2 days after discharge.   Pt has 24 hour phone number for [facility] for any questions or concerns.    Thank you  Pili Toledo RN, BSN  [facility] Liaison  [facility]  [phone]

## 2019-05-30 NOTE — PROGRESS NOTES
Care Coordinator - Discharge Planning    Admission Date/Time:  5/20/2019  Attending MD:  Flakito Gao MD     Data    Chart reviewed, discussed with interdisciplinary team.   Patient was admitted for:   1. Health Care Home    2. Generalized muscle weakness    3. Chest pain, unspecified type    4. Dyspnea, unspecified type    5. Pleural effusion    6. S/P coronary angiogram    7. Atypical chest pain    8. Hyperlipidemia LDL goal <100    9. Chronic obstructive pulmonary disease, unspecified COPD type (H)    10. Chronic low back pain without sciatica, unspecified back pain laterality    11. SVT (supraventricular tachycardia) (H)    12. 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   Pt was going to TCU, currently declining going to TCU.  RNCC and SW spent time discussing reasons for TCU referrals and pt stated that she has to pay bills and take care of things at home and continues to decline TCU.  Pt is agreeing with C, RNCC updated HC on discharge today.  Pts cousin is going to pick her up this evening after discharge papers are completed and pt is ready to go.  Pt will call cousin to pick her up at the entrance.  SW updated md mike team to change orders to home discharge.  Pt ensured RNCC that she has family at home that will be present with her upon arrival.    _______________________  [facility]  Phone  [phone]  Fax  [phone]  ______________________    Plan  Anticipated Discharge Date: 5/30/2019   Anticipated Discharge Plan:  discharge to home with [facility].      CTS Handoff completed:  YES    Kandi Trejo, ALMA, BSN    [facility]  [address] [place] [id number]    [email or web address]  Replaced by [facility] [employer] acquired by [email or web address]    Office: [phone] Pager: [phone]  To contact weekend RNYANIQUE, dial * [phone]  and enter pager number [phone] at prompt. This pager can not be contacted by text page or outside line.

## 2019-05-30 NOTE — PROGRESS NOTES
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  Nephrology consulted for management of her ESKD.     ESKD on IHD initiated IHD 5/2/19  Creatinine was 4.7 with GFR ~8 with >10g/g of albuminuria  Bx 2015 with DM nephropathy  Kidney function had been slowly declining  HD initiated 5/2/19   She is dialyzing at [facility] under care of Dr Liz on TTS schedule  Treatment time 3.5hrs , last Kt/V 1.05, EDW 67Kg--> she is now around 63-64kg with weight loss and poor appetite  -- will dialyze based on her TTS schedule  -- since initiation she has lost >10 kgs and continues to have poor appetite   -- will monitor closely  -- please get daily standing weights      Electrolytes are stable  Mild hypocalcemia -- corrected wnl (improved since dialysis initiation)  -- will dialyze on 2mcg Hectorol for now.     Euvolemic and hx of HTN  Prior to dialysis initiation was on amlodipine 10 , furosemide 40mg bid , hydralazine 25mg BiD and metoprolol 25 daily  -- will continue on lasix to 80mg BiD and amlodipine 10mg   -- goal /80s     Anemia   ACD/inflamm  Iron stores are low Isat 18% with Ferritin 265  -- EPO dosed at 4000U with Venofer 100mg Qhd  -- She is on Mircera 100mg as OP Q2wk     MBD    Hypocalcemia improved from before and phos low at 2.7 last checked  Vit D 15  -- Continue on PO 2000U D3  -- Will resume on Hectorol 2mcg with IHD     DM 2 on Insulin     Chest Pain with mild troponin leak  PSVT   SVT over night , plans for cath today  -- ECHO today with concentric LVH and 55-60% EF and pulm HTN  -- EP evaluated for recurrent SVT, on diltiazem   -- Cardiology  following      Recommendations were communicated to primary team     Seen and discussed with Dr. Mehran Sheffield MD   [phone]    Interval History :   Nursing and provider notes from last 24 hours reviewed.  Seen on dialysis and tolerating well , aimimg for 63.5kg toda and will weigh after HD run    Review of Systems:   I reviewed the following systems:  GI: + appetite. - nausea or vomiting or diarrhea.   Neuro:  - confusion  Constitutional:  - fever or chills  CV: - dyspnea or edema.  - chest pain.    Physical Exam:   I/O last 3 completed shifts:  In: 120 [P.O.:120]  Out: 800 [Urine:800]   /59   Pulse 71   Temp 98.1  F (36.7  C) (Oral)   Resp 16   Wt 64.8 kg (142 lb 13.7 oz)   SpO2 98%   BMI 24.52 kg/m       GENERAL APPEARANCE: NAD  EYES:  - scleral icterus, pupils equal  HENT: mouth without ulcers or lesions  PULM: lungs clear to auscultation bilaterally, equal air movement, no clubbing  CV: regular rhythm, normal rate, no rub     -JVD -     -edema -   GI: soft, non tender, non distended, bowel sounds are +  INTEGUMENT: no cyanosis, - rash  NEURO:  - asterixis   Access RIJ access CVC tunneled     Labs:   All labs reviewed by me  Electrolytes/Renal -   Recent Labs   Lab Test 05/30/19 0445 05/29/19 0452 05/28/19  0523 05/26/19  1126    138 139  --  136   POTASSIUM 3.0* 3.4 3.5  --  3.8   CHLORIDE 105 105 106  --  104   CO2 25 29 26  --  28   BUN 23 14 26  --  13   CR 3.26* 2.47* 3.50*  --  2.23*   * 81 76  --  115*   FRANCES 6.8* 7.2* 7.1*  --  7.7*   MAG  --  1.7 2.2  --  1.9   PHOS 3.9 2.3* 2.6   < > 1.3*    < > = values in this interval not displayed.       CBC -   Recent Labs   Lab Test 05/30/19 0445 05/29/19 0452 05/28/19  0523   WBC 7.8 6.2 11.0   HGB 7.3* 7.6* 8.8*    334 359       LFTs -   Recent Labs   Lab Test 05/23/19  0445 05/22/19  0335 05/21/19  1103 05/20/19  1942   ALKPHOS 100 102  --  125   BILITOTAL 0.4 0.1*  --  0.3   ALT 10 10  --  12   AST 18 16  --  21    PROTTOTAL 4.8* 5.0*  --  5.8*   ALBUMIN 1.2* 1.2* 1.3* 1.4*       Iron Panel -   Recent Labs   Lab Test 05/01/19  1320 02/16/18  0945 09/11/17  0928   IRON 48 46 73   IRONSAT 34 28 43   * 134 127         Imaging:  All imaging studies reviewed by me.     Current Medications:    ammonium lactate   Topical BID     aspirin  81 mg Oral Daily     atorvastatin  40 mg Oral Daily     diltiazem ER COATED BEADS  240 mg Oral Daily     docusate sodium  100 mg Oral BID     epoetin jacobo (EPOGEN,PROCRIT) inj ESRD  4,000 Units Intravenous Once in dialysis     fluticasone  1 spray Both Nostrils Daily     fluticasone-vilanterol  1 puff Inhalation Daily     furosemide  60 mg Oral BID     gelatin absorbable  1 each Topical During Hemodialysis (from stock)     levothyroxine  200 mcg Oral Daily     potassium chloride  20 mEq Oral Once     senna-docusate  1 tablet Oral BID    Or     senna-docusate  2 tablet Oral BID     sodium bicarbonate  650 mg Oral BID     sodium chloride (PF)  3 mL Intracatheter Q8H     sodium chloride (PF)  9 mL Intracatheter During Hemodialysis (from stock)     sodium chloride (PF)  9 mL Intracatheter During Hemodialysis (from stock)     tiotropium  18 mcg Inhalation Daily     vitamin D3  2,000 Units Oral Daily       Yola Sheffield MD

## 2019-05-30 NOTE — PLAN OF CARE
OT 5B: Cancel - new consult received to complete MoCA however pt off the unit this AM for HD and with other healthcare providers upon attempts this afternoon. Per chart review, pt is planning to discharge to TCU at 3:30PM, anticipate further cognitive testing can be completed at rehab to facilitate discharge needs.

## 2019-05-31 DIAGNOSIS — I47.10 SVT (SUPRAVENTRICULAR TACHYCARDIA) (H): Primary | ICD-10-CM

## 2019-06-05 ENCOUNTER — TELEPHONE (OUTPATIENT)
Dept: FAMILY MEDICINE | Facility: CLINIC | Age: 74
End: 2019-06-05

## 2019-06-07 DIAGNOSIS — M54.50 CHRONIC LOW BACK PAIN WITHOUT SCIATICA, UNSPECIFIED BACK PAIN LATERALITY: ICD-10-CM

## 2019-06-07 DIAGNOSIS — G89.29 CHRONIC LOW BACK PAIN WITHOUT SCIATICA, UNSPECIFIED BACK PAIN LATERALITY: ICD-10-CM

## 2019-06-07 NOTE — TELEPHONE ENCOUNTER
Pt is olegario out of med and want ot  tomorrow.   [facility]. .    Pt tel: [phone]      Bucky Weller

## 2019-06-10 RX ORDER — OXYCODONE HYDROCHLORIDE 10 MG/1
5-10 TABLET ORAL EVERY 8 HOURS PRN
Qty: 90 TABLET | Refills: 0 | Status: SHIPPED | OUTPATIENT
Start: 2019-06-10 | End: 2019-06-28

## 2019-06-10 NOTE — TELEPHONE ENCOUNTER
2nd call from pt is olegario out of med. Pt want a call back ASAP.   [facility]. .     Pt tel: [phone]        Bucky Weller

## 2019-06-10 NOTE — TELEPHONE ENCOUNTER
Controlled Substance Refill Request for oxyCODONE IR (ROXICODONE) 10 MG tablet   Last refill: 5/7/19-- #90 for 30 day supply    Last clinic visit: 5/8/19   Next appt: 6/24/19      Controlled substance agreement on file: Yes:  Date 4/18/18.  Documentation in problem list reviewed:  Yes  Processing:  e-scribe    [place] reviewed:  reviewed- no concerns      Kallie Pritchard RN  06/10/19  9:19 AM

## 2019-06-12 ENCOUNTER — PATIENT OUTREACH (OUTPATIENT)
Dept: GERIATRIC MEDICINE | Facility: CLINIC | Age: 74
End: 2019-06-12

## 2019-06-12 NOTE — PROGRESS NOTES
[facility] Care Coordination Contact    Call placed to HC RN (Alljeremy [phone]) to follow up on member condition.  Left voicemail requesting call back.  Batool Mai RN, BSN, PHN  [facility]  [phone]  Fax: [phone]

## 2019-06-14 ENCOUNTER — DOCUMENTATION ONLY (OUTPATIENT)
Dept: FAMILY MEDICINE | Facility: CLINIC | Age: 74
End: 2019-06-14

## 2019-06-14 NOTE — PROGRESS NOTES
[facility] now requests orders and shares plan of care/discharge summaries for some patients through [facility].  Please REPLY TO THIS MESSAGE OR ROUTE BACK TO THE AUTHOR in order to give authorization for orders when needed.  This is considered a verbal order, you will still receive a faxed copy of orders for signature.  Thank you for your assistance in improving collaboration for our patients.    ORDER    Ailyn Nieves NP & Dr. Mclain,    Requesting new orders for PT/OT evaluation.  PT and OT's original orders were canceled due to inability of getting ahold of patient. I met with the patient yesterday and after explanation of the benefits of both disciplines she has agreed to having an assessment from them.    Thank you,  Remberto Hinojosa RN Case Manager   [phone]  [email or web address]

## 2019-06-19 ENCOUNTER — HOSPITAL ENCOUNTER (OUTPATIENT)
Facility: CLINIC | Age: 74
Setting detail: OBSERVATION
Discharge: HOME | End: 2019-06-21
Attending: FAMILY MEDICINE | Admitting: INTERNAL MEDICINE
Payer: COMMERCIAL

## 2019-06-19 ENCOUNTER — APPOINTMENT (OUTPATIENT)
Dept: GENERAL RADIOLOGY | Facility: CLINIC | Age: 74
End: 2019-06-19
Attending: FAMILY MEDICINE
Payer: COMMERCIAL

## 2019-06-19 DIAGNOSIS — F17.210 CIGARETTE SMOKER: ICD-10-CM

## 2019-06-19 DIAGNOSIS — N18.6 ESRD (END STAGE RENAL DISEASE) ON DIALYSIS (H): Primary | ICD-10-CM

## 2019-06-19 DIAGNOSIS — K59.01 SLOW TRANSIT CONSTIPATION: ICD-10-CM

## 2019-06-19 DIAGNOSIS — I47.10 SVT (SUPRAVENTRICULAR TACHYCARDIA) (H): ICD-10-CM

## 2019-06-19 DIAGNOSIS — F17.200 SMOKER: ICD-10-CM

## 2019-06-19 DIAGNOSIS — J44.9 CHRONIC OBSTRUCTIVE PULMONARY DISEASE, UNSPECIFIED COPD TYPE (H): ICD-10-CM

## 2019-06-19 DIAGNOSIS — Z99.2 ESRD (END STAGE RENAL DISEASE) ON DIALYSIS (H): Primary | ICD-10-CM

## 2019-06-19 DIAGNOSIS — J44.1 COPD EXACERBATION (H): ICD-10-CM

## 2019-06-19 DIAGNOSIS — N18.4 CKD (CHRONIC KIDNEY DISEASE) STAGE 4, GFR 15-29 ML/MIN (H): ICD-10-CM

## 2019-06-19 LAB
ALBUMIN SERPL-MCNC: 2 G/DL (ref 3.4–5)
ALP SERPL-CCNC: 150 U/L (ref 40–150)
ALT SERPL W P-5'-P-CCNC: 17 U/L (ref 0–50)
ANION GAP SERPL CALCULATED.3IONS-SCNC: 7 MMOL/L (ref 3–14)
AST SERPL W P-5'-P-CCNC: 20 U/L (ref 0–45)
BASOPHILS # BLD AUTO: 0.1 10E9/L (ref 0–0.2)
BASOPHILS NFR BLD AUTO: 0.6 %
BILIRUB SERPL-MCNC: 0.2 MG/DL (ref 0.2–1.3)
BUN SERPL-MCNC: 25 MG/DL (ref 7–30)
CALCIUM SERPL-MCNC: 8 MG/DL (ref 8.5–10.1)
CHLORIDE SERPL-SCNC: 102 MMOL/L (ref 94–109)
CO2 SERPL-SCNC: 31 MMOL/L (ref 20–32)
CREAT SERPL-MCNC: 3.27 MG/DL (ref 0.52–1.04)
DIFFERENTIAL METHOD BLD: ABNORMAL
EOSINOPHIL # BLD AUTO: 0.2 10E9/L (ref 0–0.7)
EOSINOPHIL NFR BLD AUTO: 2.5 %
ERYTHROCYTE [DISTWIDTH] IN BLOOD BY AUTOMATED COUNT: 15.9 % (ref 10–15)
GFR SERPL CREATININE-BSD FRML MDRD: 13 ML/MIN/{1.73_M2}
GLUCOSE SERPL-MCNC: 103 MG/DL (ref 70–99)
HCT VFR BLD AUTO: 25.9 % (ref 35–47)
HGB BLD-MCNC: 8 G/DL (ref 11.7–15.7)
IMM GRANULOCYTES # BLD: 0 10E9/L (ref 0–0.4)
IMM GRANULOCYTES NFR BLD: 0.2 %
LYMPHOCYTES # BLD AUTO: 1.9 10E9/L (ref 0.8–5.3)
LYMPHOCYTES NFR BLD AUTO: 22.8 %
MCH RBC QN AUTO: 29.1 PG (ref 26.5–33)
MCHC RBC AUTO-ENTMCNC: 30.9 G/DL (ref 31.5–36.5)
MCV RBC AUTO: 94 FL (ref 78–100)
MONOCYTES # BLD AUTO: 0.5 10E9/L (ref 0–1.3)
MONOCYTES NFR BLD AUTO: 6.2 %
NEUTROPHILS # BLD AUTO: 5.7 10E9/L (ref 1.6–8.3)
NEUTROPHILS NFR BLD AUTO: 67.7 %
NRBC # BLD AUTO: 0 10*3/UL
NRBC BLD AUTO-RTO: 0 /100
PLATELET # BLD AUTO: 333 10E9/L (ref 150–450)
POTASSIUM SERPL-SCNC: 3.6 MMOL/L (ref 3.4–5.3)
PROT SERPL-MCNC: 6.3 G/DL (ref 6.8–8.8)
RBC # BLD AUTO: 2.75 10E12/L (ref 3.8–5.2)
SODIUM SERPL-SCNC: 140 MMOL/L (ref 133–144)
TROPONIN I BLD-MCNC: 0.04 UG/L (ref 0–0.08)
WBC # BLD AUTO: 8.4 10E9/L (ref 4–11)

## 2019-06-19 PROCEDURE — 94640 AIRWAY INHALATION TREATMENT: CPT | Performed by: FAMILY MEDICINE

## 2019-06-19 PROCEDURE — 93005 ELECTROCARDIOGRAM TRACING: CPT | Performed by: FAMILY MEDICINE

## 2019-06-19 PROCEDURE — 71045 X-RAY EXAM CHEST 1 VIEW: CPT

## 2019-06-19 PROCEDURE — 99285 EMERGENCY DEPT VISIT HI MDM: CPT | Mod: 25 | Performed by: FAMILY MEDICINE

## 2019-06-19 PROCEDURE — 85025 COMPLETE CBC W/AUTO DIFF WBC: CPT | Performed by: FAMILY MEDICINE

## 2019-06-19 PROCEDURE — 25000128 H RX IP 250 OP 636: Performed by: FAMILY MEDICINE

## 2019-06-19 PROCEDURE — 96374 THER/PROPH/DIAG INJ IV PUSH: CPT | Performed by: FAMILY MEDICINE

## 2019-06-19 PROCEDURE — 99285 EMERGENCY DEPT VISIT HI MDM: CPT | Mod: Z6 | Performed by: FAMILY MEDICINE

## 2019-06-19 PROCEDURE — 84484 ASSAY OF TROPONIN QUANT: CPT

## 2019-06-19 PROCEDURE — 80053 COMPREHEN METABOLIC PANEL: CPT | Performed by: FAMILY MEDICINE

## 2019-06-19 PROCEDURE — 25000125 ZZHC RX 250: Performed by: FAMILY MEDICINE

## 2019-06-19 RX ORDER — METHYLPREDNISOLONE SODIUM SUCCINATE 125 MG/2ML
125 INJECTION, POWDER, LYOPHILIZED, FOR SOLUTION INTRAMUSCULAR; INTRAVENOUS ONCE
Status: COMPLETED | OUTPATIENT
Start: 2019-06-19 | End: 2019-06-19

## 2019-06-19 RX ORDER — METOPROLOL SUCCINATE 25 MG/1
TABLET, EXTENDED RELEASE ORAL
COMMUNITY
Start: 2019-06-10 | End: 2019-06-20

## 2019-06-19 RX ORDER — ERGOCALCIFEROL 1.25 MG/1
CAPSULE, LIQUID FILLED ORAL
Status: ON HOLD | COMMUNITY
Start: 2018-09-14 | End: 2019-06-21

## 2019-06-19 RX ORDER — AMMONIUM LACTATE 12 G/100G
CREAM TOPICAL
Status: ON HOLD | COMMUNITY
Start: 2019-05-10 | End: 2019-06-21

## 2019-06-19 RX ORDER — INSULIN GLARGINE 100 [IU]/ML
INJECTION, SOLUTION SUBCUTANEOUS
Status: ON HOLD | COMMUNITY
Start: 2018-07-26 | End: 2019-06-21

## 2019-06-19 RX ORDER — SODIUM BICARBONATE 650 MG/1
TABLET ORAL
Status: ON HOLD | COMMUNITY
Start: 2019-03-29 | End: 2019-06-21

## 2019-06-19 RX ORDER — IPRATROPIUM BROMIDE AND ALBUTEROL SULFATE 2.5; .5 MG/3ML; MG/3ML
3 SOLUTION RESPIRATORY (INHALATION) ONCE
Status: COMPLETED | OUTPATIENT
Start: 2019-06-19 | End: 2019-06-19

## 2019-06-19 RX ORDER — AMLODIPINE BESYLATE 10 MG/1
TABLET ORAL
COMMUNITY
Start: 2019-06-10 | End: 2019-06-20

## 2019-06-19 RX ADMIN — METHYLPREDNISOLONE SODIUM SUCCINATE 125 MG: 125 INJECTION, POWDER, FOR SOLUTION INTRAMUSCULAR; INTRAVENOUS at 23:42

## 2019-06-19 RX ADMIN — IPRATROPIUM BROMIDE AND ALBUTEROL SULFATE 3 ML: .5; 3 SOLUTION RESPIRATORY (INHALATION) at 22:13

## 2019-06-19 ASSESSMENT — ENCOUNTER SYMPTOMS
SHORTNESS OF BREATH: 1
ABDOMINAL PAIN: 0
FEVER: 0

## 2019-06-20 ENCOUNTER — TELEPHONE (OUTPATIENT)
Dept: FAMILY MEDICINE | Facility: CLINIC | Age: 74
End: 2019-06-20

## 2019-06-20 PROBLEM — J44.1 COPD EXACERBATION (H): Status: ACTIVE | Noted: 2019-06-20

## 2019-06-20 LAB
INTERPRETATION ECG - MUSE: NORMAL
MAGNESIUM SERPL-MCNC: 2.2 MG/DL (ref 1.6–2.3)
POTASSIUM SERPL-SCNC: 4.1 MMOL/L (ref 3.4–5.3)
TSH SERPL DL<=0.005 MIU/L-ACNC: 0.35 MU/L (ref 0.4–4)

## 2019-06-20 PROCEDURE — 25000132 ZZH RX MED GY IP 250 OP 250 PS 637: Performed by: STUDENT IN AN ORGANIZED HEALTH CARE EDUCATION/TRAINING PROGRAM

## 2019-06-20 PROCEDURE — 90937 HEMODIALYSIS REPEATED EVAL: CPT

## 2019-06-20 PROCEDURE — 27211427 ZZ H AEROBIKA WITH MANOMETER

## 2019-06-20 PROCEDURE — 94640 AIRWAY INHALATION TREATMENT: CPT

## 2019-06-20 PROCEDURE — G0378 HOSPITAL OBSERVATION PER HR: HCPCS

## 2019-06-20 PROCEDURE — 40000275 ZZH STATISTIC RCP TIME EA 10 MIN

## 2019-06-20 PROCEDURE — 94640 AIRWAY INHALATION TREATMENT: CPT | Mod: 76

## 2019-06-20 PROCEDURE — 36415 COLL VENOUS BLD VENIPUNCTURE: CPT | Performed by: STUDENT IN AN ORGANIZED HEALTH CARE EDUCATION/TRAINING PROGRAM

## 2019-06-20 PROCEDURE — 63400005 ZZH RX 634: Performed by: INTERNAL MEDICINE

## 2019-06-20 PROCEDURE — 84132 ASSAY OF SERUM POTASSIUM: CPT | Performed by: STUDENT IN AN ORGANIZED HEALTH CARE EDUCATION/TRAINING PROGRAM

## 2019-06-20 PROCEDURE — 84443 ASSAY THYROID STIM HORMONE: CPT | Performed by: STUDENT IN AN ORGANIZED HEALTH CARE EDUCATION/TRAINING PROGRAM

## 2019-06-20 PROCEDURE — 99220 ZZC INITIAL OBSERVATION CARE,LEVL III: CPT | Mod: AI | Performed by: STUDENT IN AN ORGANIZED HEALTH CARE EDUCATION/TRAINING PROGRAM

## 2019-06-20 PROCEDURE — 25000125 ZZHC RX 250: Performed by: STUDENT IN AN ORGANIZED HEALTH CARE EDUCATION/TRAINING PROGRAM

## 2019-06-20 PROCEDURE — 25000131 ZZH RX MED GY IP 250 OP 636 PS 637: Performed by: STUDENT IN AN ORGANIZED HEALTH CARE EDUCATION/TRAINING PROGRAM

## 2019-06-20 PROCEDURE — 83735 ASSAY OF MAGNESIUM: CPT | Performed by: STUDENT IN AN ORGANIZED HEALTH CARE EDUCATION/TRAINING PROGRAM

## 2019-06-20 PROCEDURE — G0257 UNSCHED DIALYSIS ESRD PT HOS: HCPCS

## 2019-06-20 PROCEDURE — 25000128 H RX IP 250 OP 636: Performed by: INTERNAL MEDICINE

## 2019-06-20 RX ORDER — NALOXONE HYDROCHLORIDE 0.4 MG/ML
.1-.4 INJECTION, SOLUTION INTRAMUSCULAR; INTRAVENOUS; SUBCUTANEOUS
Status: DISCONTINUED | OUTPATIENT
Start: 2019-06-20 | End: 2019-06-21 | Stop reason: HOSPADM

## 2019-06-20 RX ORDER — PREDNISONE 20 MG/1
40 TABLET ORAL DAILY
Status: DISCONTINUED | OUTPATIENT
Start: 2019-06-20 | End: 2019-06-21 | Stop reason: HOSPADM

## 2019-06-20 RX ORDER — ASPIRIN 81 MG/1
81 TABLET ORAL DAILY
Status: DISCONTINUED | OUTPATIENT
Start: 2019-06-20 | End: 2019-06-21 | Stop reason: HOSPADM

## 2019-06-20 RX ORDER — ONDANSETRON 4 MG/1
4 TABLET, ORALLY DISINTEGRATING ORAL EVERY 6 HOURS PRN
Status: DISCONTINUED | OUTPATIENT
Start: 2019-06-20 | End: 2019-06-21 | Stop reason: HOSPADM

## 2019-06-20 RX ORDER — ACETAMINOPHEN 650 MG/1
650 SUPPOSITORY RECTAL EVERY 4 HOURS PRN
Status: DISCONTINUED | OUTPATIENT
Start: 2019-06-20 | End: 2019-06-21 | Stop reason: HOSPADM

## 2019-06-20 RX ORDER — ONDANSETRON 2 MG/ML
4 INJECTION INTRAMUSCULAR; INTRAVENOUS EVERY 6 HOURS PRN
Status: DISCONTINUED | OUTPATIENT
Start: 2019-06-20 | End: 2019-06-21 | Stop reason: HOSPADM

## 2019-06-20 RX ORDER — NYSTATIN 100000 [USP'U]/G
1 POWDER TOPICAL 3 TIMES DAILY PRN
Status: DISCONTINUED | OUTPATIENT
Start: 2019-06-20 | End: 2019-06-20

## 2019-06-20 RX ORDER — ACETAMINOPHEN 325 MG/1
650 TABLET ORAL EVERY 4 HOURS PRN
Status: DISCONTINUED | OUTPATIENT
Start: 2019-06-20 | End: 2019-06-21 | Stop reason: HOSPADM

## 2019-06-20 RX ORDER — DILTIAZEM HYDROCHLORIDE 240 MG/1
240 CAPSULE, COATED, EXTENDED RELEASE ORAL DAILY
Status: DISCONTINUED | OUTPATIENT
Start: 2019-06-20 | End: 2019-06-21 | Stop reason: HOSPADM

## 2019-06-20 RX ORDER — LEVOTHYROXINE SODIUM 150 UG/1
150 TABLET ORAL DAILY
Status: DISCONTINUED | OUTPATIENT
Start: 2019-06-21 | End: 2019-06-21 | Stop reason: HOSPADM

## 2019-06-20 RX ORDER — LEVOTHYROXINE SODIUM 200 UG/1
200 TABLET ORAL DAILY
Status: DISCONTINUED | OUTPATIENT
Start: 2019-06-20 | End: 2019-06-20

## 2019-06-20 RX ORDER — ATORVASTATIN CALCIUM 40 MG/1
40 TABLET, FILM COATED ORAL DAILY
Status: DISCONTINUED | OUTPATIENT
Start: 2019-06-20 | End: 2019-06-21 | Stop reason: HOSPADM

## 2019-06-20 RX ORDER — FLUTICASONE PROPIONATE 50 MCG
1 SPRAY, SUSPENSION (ML) NASAL DAILY
Status: DISCONTINUED | OUTPATIENT
Start: 2019-06-20 | End: 2019-06-21 | Stop reason: CLARIF

## 2019-06-20 RX ORDER — LANOLIN ALCOHOL/MO/W.PET/CERES
3 CREAM (GRAM) TOPICAL
Status: DISCONTINUED | OUTPATIENT
Start: 2019-06-20 | End: 2019-06-21 | Stop reason: HOSPADM

## 2019-06-20 RX ORDER — FLUTICASONE PROPIONATE 50 MCG
1 SPRAY, SUSPENSION (ML) NASAL DAILY
Status: DISCONTINUED | OUTPATIENT
Start: 2019-06-20 | End: 2019-06-20

## 2019-06-20 RX ORDER — FUROSEMIDE 40 MG
40 TABLET ORAL
Status: DISCONTINUED | OUTPATIENT
Start: 2019-06-20 | End: 2019-06-21 | Stop reason: HOSPADM

## 2019-06-20 RX ORDER — IPRATROPIUM BROMIDE AND ALBUTEROL SULFATE 2.5; .5 MG/3ML; MG/3ML
3 SOLUTION RESPIRATORY (INHALATION)
Status: DISCONTINUED | OUTPATIENT
Start: 2019-06-20 | End: 2019-06-21 | Stop reason: HOSPADM

## 2019-06-20 RX ORDER — ACETAMINOPHEN 325 MG/1
650 TABLET ORAL EVERY 4 HOURS PRN
Status: DISCONTINUED | OUTPATIENT
Start: 2019-06-20 | End: 2019-06-20

## 2019-06-20 RX ORDER — AMOXICILLIN 250 MG
2 CAPSULE ORAL 2 TIMES DAILY
Status: DISCONTINUED | OUTPATIENT
Start: 2019-06-20 | End: 2019-06-21 | Stop reason: HOSPADM

## 2019-06-20 RX ORDER — AMOXICILLIN 250 MG
1 CAPSULE ORAL 2 TIMES DAILY
Status: DISCONTINUED | OUTPATIENT
Start: 2019-06-20 | End: 2019-06-21 | Stop reason: HOSPADM

## 2019-06-20 RX ADMIN — SODIUM CHLORIDE 300 ML: 9 INJECTION, SOLUTION INTRAVENOUS at 15:26

## 2019-06-20 RX ADMIN — FUROSEMIDE 40 MG: 40 TABLET ORAL at 08:39

## 2019-06-20 RX ADMIN — SENNOSIDES AND DOCUSATE SODIUM 1 TABLET: 8.6; 5 TABLET ORAL at 08:39

## 2019-06-20 RX ADMIN — EPOETIN ALFA 4000 UNITS: 10000 SOLUTION INTRAVENOUS; SUBCUTANEOUS at 15:37

## 2019-06-20 RX ADMIN — DILTIAZEM HYDROCHLORIDE 240 MG: 240 CAPSULE, COATED, EXTENDED RELEASE ORAL at 08:38

## 2019-06-20 RX ADMIN — ASPIRIN 81 MG: 81 TABLET, COATED ORAL at 08:39

## 2019-06-20 RX ADMIN — Medication: at 15:26

## 2019-06-20 RX ADMIN — PREDNISONE 40 MG: 20 TABLET ORAL at 08:39

## 2019-06-20 RX ADMIN — IPRATROPIUM BROMIDE AND ALBUTEROL SULFATE 3 ML: .5; 3 SOLUTION RESPIRATORY (INHALATION) at 11:27

## 2019-06-20 RX ADMIN — SODIUM CHLORIDE 250 ML: 9 INJECTION, SOLUTION INTRAVENOUS at 15:26

## 2019-06-20 RX ADMIN — VITAMIN D, TAB 1000IU (100/BT) 2000 UNITS: 25 TAB at 08:39

## 2019-06-20 RX ADMIN — SENNOSIDES AND DOCUSATE SODIUM 1 TABLET: 8.6; 5 TABLET ORAL at 20:30

## 2019-06-20 RX ADMIN — ATORVASTATIN CALCIUM 40 MG: 40 TABLET, FILM COATED ORAL at 08:39

## 2019-06-20 RX ADMIN — IPRATROPIUM BROMIDE AND ALBUTEROL SULFATE 3 ML: .5; 3 SOLUTION RESPIRATORY (INHALATION) at 07:21

## 2019-06-20 RX ADMIN — LEVOTHYROXINE SODIUM 200 MCG: 200 TABLET ORAL at 08:38

## 2019-06-20 RX ADMIN — IPRATROPIUM BROMIDE AND ALBUTEROL SULFATE 3 ML: .5; 3 SOLUTION RESPIRATORY (INHALATION) at 20:48

## 2019-06-20 ASSESSMENT — ENCOUNTER SYMPTOMS
COUGH: 1
HEMATOLOGIC/LYMPHATIC NEGATIVE: 1
NAUSEA: 0
MYALGIAS: 0
CHILLS: 0
NEUROLOGICAL NEGATIVE: 1
VOMITING: 0

## 2019-06-20 NOTE — PLAN OF CARE
Observation goals PRIOR TO DISCHARGE     Comments: 1) Improved lung exam ls coarse.  O2 sats 93% on RA.  2) Seen by COPDcare team  pending  3) Stable vital signs   vitals stable  4) Patient feels safe to return to home   Nurse to notify provider when observation goals have been met and patient is ready for discharge.

## 2019-06-20 NOTE — PROGRESS NOTES
[facility]  Patient is currently open to home care services with [facility].  The patient is currently receiving RN/PT/OT services.  [facility]  and team have been notified of patient admission.  [facility] liaison will continue to follow patient during stay.  If appropriate provide orders to resume home care at time of discharge.    Thank you  Pili Toledo RN, BSN  [facility] Liaison  [facility]  [phone]
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
## 2019-06-20 NOTE — PLAN OF CARE
Observation goals PRIOR TO DISCHARGE     Comments: 1) Improved lung exam ls coarse.  O2 sats 93% on RA when awake.  Pt desats slightly to 89% on RA.  Improves to 95% while sleeping on 1 LNC  2) Seen by COPDcare team  . Not completed.  3) Stable vital signs   vitals stable.  4) Patient feels safe to return to home   Pt en route to HD  Nurse to notify provider when observation goals have been met and patient is ready for discharge.

## 2019-06-20 NOTE — ED NOTES
I did not see this patient.  Patient was seen by different provider.     Serge Nelson MD  06/20/19 0006

## 2019-06-20 NOTE — PROVIDER NOTIFICATION
"Provider notified, \"FYI: Pts BP when arrived 86/54, rechecked and came to 91/61. Pt also requiring 3L O2 via nasal cannula.\"  "
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
## 2019-06-20 NOTE — PLAN OF CARE
Outpatient/Observation goals to be met before discharge home:     1) Improved lung exam: Not met  2) Seen by COPDcare team: Not met  3) Stable vital signs: On-going, pt requiring supplemental oxygen  4) Patient feels safe to return to home: Not met

## 2019-06-20 NOTE — ED NOTES
Observation Brochure and Video    Patient informed of observation status based on provider's order.  Observation brochure was given and the video watched. Patient/Family stated understanding. Questions answered.  Musa Gunter

## 2019-06-20 NOTE — PROGRESS NOTES
Care Coordinator - Discharge Planning    Admission Date/Time:  6/19/2019  Attending MD:  Sin Zapien MD     Data  Date of initial CC assessment:  6/20/19  Chart reviewed, discussed with interdisciplinary team.   Patient was admitted for:   1. ESRD (end stage renal disease) on dialysis (H)    2. COPD exacerbation (H)    3. Cigarette smoker         Assessment   Concerns with insurance coverage for discharge needs: None.  Current Living Situation: Patient lives with family.  Support System: Supportive and Involved  Services Involved: Dialysis Services and Home Care  Transportation at Discharge: Car, Family or friend will provide and Per pt her son will transport her home  Transportation to Medical Appointments:    - Name of caregiver: Self and family  Barriers to Discharge: chronically ill     Pt admitted with COPD exacerbation.   Pt  With a medical history significant for type 2 diabetes mellitus with diabetic nephropathy, neuropathy now on hemodialysis, solitary kidney status post donation, hypertension, COPD, hypothyroidism, peripheral vascular disease, prior latent tuberculosis infection persistently positive quant gold, tobacco abuse disorder.  Per chart review patient open to [facility] for home PT and RN services.  Patient dialyzes [facility] at [facility] :  [address] [place] [address] [phone]  [phone]    Met with pt and provider.  Anticipate that pt will be discharged home tomorrow.  Plan for pt to resume home health services.  Pt notes no concerns about her ability to return home.  Discharge orders updated.   ALVAREZ form reviewed, signed and copy given to patient.       Coordination of Care and Referrals: Provided patient/family with options for Dialysis Services and Home Care.      Plan  Anticipated Discharge Date:  6/21/19  Anticipated Discharge Plan:  6/21/19    Amrita Browning RN BSN, PHN RN Care Coordinator  Internal Medicine   [phone]  Pager:  [phone]  Weekend RN Care Coordinator job code * * * [phone]  6/20/2019 4:09 PM
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
## 2019-06-20 NOTE — TELEPHONE ENCOUNTER
Reason for Call:  Home Health Care    Malia  with [facility] called regarding:     Orders are needed for this patient.     PT: once a week X 3 weeks for strengthening, gait training, balance and fall prevention    Pt Provider:  Ailyn Nieves     Phone Number Homecare Nurse can be reached at: [phone]    Can we leave a detailed message on this number? YES    Phone number patient can be reached at: Home number on file [phone] (home)    Best Time: anytime      Call taken on 6/20/2019 at 9:26 AM by Bucky Weller

## 2019-06-21 ENCOUNTER — APPOINTMENT (OUTPATIENT)
Dept: CARDIOLOGY | Facility: CLINIC | Age: 74
End: 2019-06-21
Attending: PHYSICIAN ASSISTANT
Payer: COMMERCIAL

## 2019-06-21 VITALS
SYSTOLIC BLOOD PRESSURE: 138 MMHG | DIASTOLIC BLOOD PRESSURE: 55 MMHG | RESPIRATION RATE: 18 BRPM | HEART RATE: 77 BPM | BODY MASS INDEX: 24.2 KG/M2 | WEIGHT: 141 LBS | OXYGEN SATURATION: 96 % | TEMPERATURE: 98.4 F

## 2019-06-21 LAB
ALBUMIN SERPL-MCNC: 1.9 G/DL (ref 3.4–5)
ANION GAP SERPL CALCULATED.3IONS-SCNC: 8 MMOL/L (ref 3–14)
BUN SERPL-MCNC: 22 MG/DL (ref 7–30)
CALCIUM SERPL-MCNC: 8.1 MG/DL (ref 8.5–10.1)
CHLORIDE SERPL-SCNC: 101 MMOL/L (ref 94–109)
CO2 SERPL-SCNC: 26 MMOL/L (ref 20–32)
CREAT SERPL-MCNC: 2.33 MG/DL (ref 0.52–1.04)
ERYTHROCYTE [DISTWIDTH] IN BLOOD BY AUTOMATED COUNT: 15.8 % (ref 10–15)
GFR SERPL CREATININE-BSD FRML MDRD: 20 ML/MIN/{1.73_M2}
GLUCOSE SERPL-MCNC: 191 MG/DL (ref 70–99)
HCT VFR BLD AUTO: 25.4 % (ref 35–47)
HGB BLD-MCNC: 7.4 G/DL (ref 11.7–15.7)
MCH RBC QN AUTO: 28.6 PG (ref 26.5–33)
MCHC RBC AUTO-ENTMCNC: 29.1 G/DL (ref 31.5–36.5)
MCV RBC AUTO: 98 FL (ref 78–100)
PHOSPHATE SERPL-MCNC: 3.3 MG/DL (ref 2.5–4.5)
PLATELET # BLD AUTO: 307 10E9/L (ref 150–450)
POTASSIUM SERPL-SCNC: 4.1 MMOL/L (ref 3.4–5.3)
RBC # BLD AUTO: 2.59 10E12/L (ref 3.8–5.2)
SODIUM SERPL-SCNC: 136 MMOL/L (ref 133–144)
WBC # BLD AUTO: 12.1 10E9/L (ref 4–11)

## 2019-06-21 PROCEDURE — 25000125 ZZHC RX 250: Performed by: STUDENT IN AN ORGANIZED HEALTH CARE EDUCATION/TRAINING PROGRAM

## 2019-06-21 PROCEDURE — 40000275 ZZH STATISTIC RCP TIME EA 10 MIN

## 2019-06-21 PROCEDURE — 0298T ZIO PATCH HOLTER ADULT PEDIATRIC GREATER THAN 48 HRS: CPT | Performed by: INTERNAL MEDICINE

## 2019-06-21 PROCEDURE — 25000132 ZZH RX MED GY IP 250 OP 250 PS 637: Performed by: PHYSICIAN ASSISTANT

## 2019-06-21 PROCEDURE — 40000962 ZZH STATISTIC CHRONIC DISEASE SPECIALIST RT CONSULT

## 2019-06-21 PROCEDURE — 25000132 ZZH RX MED GY IP 250 OP 250 PS 637: Performed by: STUDENT IN AN ORGANIZED HEALTH CARE EDUCATION/TRAINING PROGRAM

## 2019-06-21 PROCEDURE — 99406 BEHAV CHNG SMOKING 3-10 MIN: CPT

## 2019-06-21 PROCEDURE — 85027 COMPLETE CBC AUTOMATED: CPT | Performed by: STUDENT IN AN ORGANIZED HEALTH CARE EDUCATION/TRAINING PROGRAM

## 2019-06-21 PROCEDURE — 0296T ZIO PATCH HOLTER ADULT PEDIATRIC GREATER THAN 48 HRS: CPT

## 2019-06-21 PROCEDURE — G0463 HOSPITAL OUTPT CLINIC VISIT: HCPCS

## 2019-06-21 PROCEDURE — 94799 UNLISTED PULMONARY SVC/PX: CPT

## 2019-06-21 PROCEDURE — G0378 HOSPITAL OBSERVATION PER HR: HCPCS

## 2019-06-21 PROCEDURE — 94640 AIRWAY INHALATION TREATMENT: CPT | Mod: 76

## 2019-06-21 PROCEDURE — 40000989 ZZH STATISTIC CHRONIC PULMONARY DISEASE SPECIALIST

## 2019-06-21 PROCEDURE — 36415 COLL VENOUS BLD VENIPUNCTURE: CPT | Performed by: STUDENT IN AN ORGANIZED HEALTH CARE EDUCATION/TRAINING PROGRAM

## 2019-06-21 PROCEDURE — 80069 RENAL FUNCTION PANEL: CPT | Performed by: STUDENT IN AN ORGANIZED HEALTH CARE EDUCATION/TRAINING PROGRAM

## 2019-06-21 PROCEDURE — 94640 AIRWAY INHALATION TREATMENT: CPT

## 2019-06-21 PROCEDURE — 0298T ZZC EXT ECG > 48HR TO 21 DAY REVIEW AND INTERPRETATN: CPT | Performed by: INTERNAL MEDICINE

## 2019-06-21 PROCEDURE — 99217 ZZC OBSERVATION CARE DISCHARGE: CPT | Performed by: STUDENT IN AN ORGANIZED HEALTH CARE EDUCATION/TRAINING PROGRAM

## 2019-06-21 PROCEDURE — 25000131 ZZH RX MED GY IP 250 OP 636 PS 637: Performed by: STUDENT IN AN ORGANIZED HEALTH CARE EDUCATION/TRAINING PROGRAM

## 2019-06-21 RX ORDER — POLYETHYLENE GLYCOL 3350 17 G/17G
17 POWDER, FOR SOLUTION ORAL DAILY PRN
Qty: 10 PACKET | Refills: 0 | Status: ON HOLD | OUTPATIENT
Start: 2019-06-21 | End: 2020-02-01

## 2019-06-21 RX ORDER — FUROSEMIDE 20 MG
40 TABLET ORAL
Qty: 180 TABLET | Refills: 0
Start: 2019-06-21 | End: 2019-07-25

## 2019-06-21 RX ORDER — POLYETHYLENE GLYCOL 3350 17 G
2 POWDER IN PACKET (EA) ORAL
Qty: 20 LOZENGE | Refills: 1 | Status: SHIPPED | OUTPATIENT
Start: 2019-06-21 | End: 2019-06-28

## 2019-06-21 RX ORDER — POLYETHYLENE GLYCOL 3350 17 G/17G
17 POWDER, FOR SOLUTION ORAL DAILY
Status: DISCONTINUED | OUTPATIENT
Start: 2019-06-21 | End: 2019-06-21 | Stop reason: HOSPADM

## 2019-06-21 RX ORDER — PREDNISONE 20 MG/1
40 TABLET ORAL DAILY
Qty: 6 TABLET | Refills: 0 | Status: SHIPPED | OUTPATIENT
Start: 2019-06-21 | End: 2019-06-28

## 2019-06-21 RX ADMIN — IPRATROPIUM BROMIDE AND ALBUTEROL SULFATE 3 ML: .5; 3 SOLUTION RESPIRATORY (INHALATION) at 07:14

## 2019-06-21 RX ADMIN — ATORVASTATIN CALCIUM 40 MG: 40 TABLET, FILM COATED ORAL at 08:13

## 2019-06-21 RX ADMIN — POLYETHYLENE GLYCOL 3350 17 G: 17 POWDER, FOR SOLUTION ORAL at 08:58

## 2019-06-21 RX ADMIN — FUROSEMIDE 40 MG: 40 TABLET ORAL at 08:14

## 2019-06-21 RX ADMIN — SENNOSIDES AND DOCUSATE SODIUM 2 TABLET: 8.6; 5 TABLET ORAL at 08:13

## 2019-06-21 RX ADMIN — IPRATROPIUM BROMIDE AND ALBUTEROL SULFATE 3 ML: .5; 3 SOLUTION RESPIRATORY (INHALATION) at 13:35

## 2019-06-21 RX ADMIN — DILTIAZEM HYDROCHLORIDE 240 MG: 240 CAPSULE, COATED, EXTENDED RELEASE ORAL at 08:14

## 2019-06-21 RX ADMIN — LEVOTHYROXINE SODIUM 150 MCG: 150 TABLET ORAL at 08:14

## 2019-06-21 RX ADMIN — ASPIRIN 81 MG: 81 TABLET, COATED ORAL at 08:13

## 2019-06-21 RX ADMIN — VITAMIN D, TAB 1000IU (100/BT) 2000 UNITS: 25 TAB at 08:13

## 2019-06-21 RX ADMIN — PREDNISONE 40 MG: 20 TABLET ORAL at 08:13

## 2019-06-21 NOTE — DISCHARGE SUMMARY
[facility], [place]  Hospitalist Discharge Summary       Date of Admission:  6/19/2019  Date of Discharge:  6/21/2019  Discharging Provider: Emery Hampton PA-C  Discharge Team: Hospitalist Service, Gold 5    Discharge Diagnoses 1. COPD exacerbation   2. Paroxysmal SVT/NSVT   3. ESRD on HD   4. Hypertension   5. Hypothyroidism   6. Occluded left subclavian stent    Follow-ups Needed After Discharge   Follow-up Appointments     Adult [email or web address] Follow-up and recommended labs and tests      Follow up with primary care provider, Ailyn Nieves, within 7   days for hospital follow- up.  The following labs/tests are recommended:   CBC and BMP.      Appointments on [place] and/or [facility] (with [facility] [[email or web address]] or [facility]   provider or service). Call [phone] if you haven't heard regarding   these appointments within 7 days of discharge.             Discharge Disposition   Discharged to home  Condition at discharge: Stable    Hospital Course Kim Anne is a 73 year old female with history of ESRD on HD, paroxysmal SVT/NSVT, DM2, COPD, HTN, hypothyroidism, and occluded left subclavian stent (causing asymmetric upper extremity BP's) who presented with dyspnea and chest tightness. She was evaluated in the ED. EKG, troponin, and CXR negative. She was felt to have a COPD exacerbation. She was treated with bronchodilators with subsequent improvement.    She was admitted to observation for ongoing management of suspected COPD exacerbation. She was treated with steroids and scheduled Duonebs with continued improvement. She was seen by our chronic lung disease RT for ongoing education. She was found to have poor technique with spiriva. She is also on LABA/inhaled steroid combo inhaler BID. She was subsequently transitioned to Trelegy once daily and noted to have adequate technique. There is no clear precipitant for this exacerbation. History of tobacco use, which may be cause.      During recent admission, patient was noted to have chest pain, dyspnea, and hypoxia felt secondary to paroxysmal SVT and NSVT. She was noted to have a a brief run of each during her stay. The burden of arrhythmia is unknown. She will be discharged with a Ziopatch x 2 weeks. It is possible that presenting symptoms could be due to paroxysm of SVT, however noted to be in NSR upon arrival.    Her hospital stay was otherwise uncomplicated. Nephrology was consulted for scheduled HD.     She will discharge home with plans to follow up with primary care in 1 week.     Consultations This Hospital Stay   IP RESPIRATORY CARE CHRONIC PULMONARY DISEASE SPECIALIST  SMOKING CESSATION PROGRAM IP CONSULT  NEPHROLOGY ESRD ADULT IP CONSULT    Code Status   Full Code    Time Spent on this Encounter   I, Emery Hampton, personally saw the patient today and spent greater than 30 minutes discharging this patient.       Emery Hampton PA-C  [facility], [place]  ______________________________________________________________________    Physical Exam   Vital Signs: Temp: 98.4  F (36.9  C) Temp src: Oral BP: 138/55 Pulse: 77 Heart Rate: 78 Resp: 18 SpO2: 96 % O2 Device: None (Room air) Oxygen Delivery: 2 LPM  Weight: 141 lbs 0 oz  General Appearance: NAD. Breathing comfortably on room air. Oriented x3.   Respiratory: CTAB.   Cardiovascular: RRR. S1, S2. No murmur.   GI: Soft, ND, NT. Active BS.   Skin: No rash.   Other:  Trace LE edema. No calf tenderness. No focal neuro deficits.           Primary Care Physician   Ailyn Nieves    Discharge Orders      Medication Therapy Management Referral      Home care nursing referral      Home Care PT Referral for Hospital Discharge      Reason for your hospital stay    Admitted to observation with suspected COPD exacerbation.     Adult [facility] Follow-up and recommended labs and tests    Follow up with primary care provider, Ailyn Nieves, within 7  days for hospital follow- up.  The following labs/tests are recommended: CBC and BMP.      Appointments on [place] and/or [facility] (with [facility] or [facility] provider or service). Call [phone] if you haven't heard regarding these appointments within 7 days of discharge.     Activity    Your activity upon discharge: activity as tolerated     Full Code     Zio Patch Holter Adult Pediatric Greater than 48 hrs     Diet    Follow this diet upon discharge: Dialysis Diet       Significant Results and Procedures   ROUTINE IP LABS (Last four results)  Recent Labs   Lab 06/21/19  0610 06/20/19  1400 06/19/19  2209     --  140   POTASSIUM 4.1 4.1 3.6   CHLORIDE 101  --  102   CO2 26  --  31   ANIONGAP 8  --  7   *  --  103*   BUN 22  --  25   CR 2.33*  --  3.27*   FRANCES 8.1*  --  8.0*   MAG  --  2.2  --    PHOS 3.3  --   --    PROTTOTAL  --   --  6.3*   ALBUMIN 1.9*  --  2.0*   BILITOTAL  --   --  0.2   ALKPHOS  --   --  150   AST  --   --  20   ALT  --   --  17     Recent Labs   Lab 06/21/19  0610 06/19/19  2209   WBC 12.1* 8.4   RBC 2.59* 2.75*   HGB 7.4* 8.0*   HCT 25.4* 25.9*   MCV 98 94   MCH 28.6 29.1   MCHC 29.1* 30.9*   RDW 15.8* 15.9*    333     No lab results found in last 7 days.     Glucose Values Latest Ref Rng & Units 5/29/2019 5/29/2019 5/30/2019 5/30/2019 5/30/2019 6/19/2019 6/21/2019   Bedside Glucose (mg/dl )  - -- -- -- -- -- -- --   GLUCOSE 70 - 99 mg/dL 135(H) 191(H) 120(H) 105(H) 90 103(H) 191(H)   Some recent data might be hidden            Discharge Medications   Current Discharge Medication List      START taking these medications    Details   Fluticasone-Umeclidin-Vilanterol (TRELEGY ELLIPTA) 100-62.5-25 MCG/INH oral inhaler Inhale 1 puff into the lungs daily  Qty: 1 Inhaler, Refills: 0    Associated Diagnoses: Chronic obstructive pulmonary disease, unspecified COPD type (H)      nicotine (COMMIT) 2 MG lozenge Place 1 lozenge (2 mg) inside cheek every hour as needed for  smoking cessation  Qty: 20 lozenge, Refills: 1    Associated Diagnoses: Smoker      polyethylene glycol (MIRALAX/GLYCOLAX) packet Take 17 g by mouth daily as needed for constipation  Qty: 10 packet, Refills: 0    Associated Diagnoses: Slow transit constipation      predniSONE (DELTASONE) 20 MG tablet Take 2 tablets (40 mg) by mouth daily for 3 days  Qty: 6 tablet, Refills: 0    Associated Diagnoses: COPD exacerbation (H)         CONTINUE these medications which have CHANGED    Details   furosemide (LASIX) 20 MG tablet Take 2 tablets (40 mg) by mouth 2 times daily  Qty: 180 tablet, Refills: 0    Associated Diagnoses: CKD (chronic kidney disease) stage 4, GFR 15-29 ml/min (H)         CONTINUE these medications which have NOT CHANGED    Details   acetaminophen (TYLENOL) 325 MG tablet Take 2 tablets (650 mg) by mouth every 4 hours as needed for mild pain    Associated Diagnoses: CKD (chronic kidney disease) stage 4, GFR 15-29 ml/min (H)      albuterol (PROAIR HFA/PROVENTIL HFA/VENTOLIN HFA) 108 (90 Base) MCG/ACT inhaler Inhale 2 puffs into the lungs every 6 hours as needed for shortness of breath / dyspnea or wheezing  Qty: 18 g, Refills: 3    Associated Diagnoses: Chronic obstructive pulmonary disease, unspecified COPD type (H)      ammonium lactate (LAC-HYDRIN) 12 % external lotion Apply topically 2 times daily  Qty: 225 g, Refills: 0    Associated Diagnoses: Dry skin      ASPIR-LOW 81 MG EC tablet Take 1 tablet (81 mg) by mouth daily  Qty: 90 tablet, Refills: 3    Associated Diagnoses: History of MI (myocardial infarction); Essential hypertension, benign      atorvastatin (LIPITOR) 40 MG tablet Take 1 tablet (40 mg) by mouth daily  Qty: 90 tablet, Refills: 1    Associated Diagnoses: Hyperlipidemia LDL goal <100      diltiazem ER COATED BEADS (CARDIZEM CD/CARTIA XT) 240 MG 24 hr capsule Take 1 capsule (240 mg) by mouth daily  Qty: 30 capsule, Refills: 0    Associated Diagnoses: SVT (supraventricular tachycardia) (H)       docusate sodium (COLACE) 100 MG capsule Take 1 capsule (100 mg) by mouth 2 times daily  Qty: 60 capsule, Refills: 4    Associated Diagnoses: Constipation, unspecified constipation type      fluticasone (FLONASE) 50 MCG/ACT nasal spray Spray 1 spray into both nostrils daily  Qty: 9.9 mL, Refills: 1    Associated Diagnoses: Rhinorrhea      hydrOXYzine (ATARAX) 10 MG tablet Take 1 tablet (10 mg) by mouth 3 times daily as needed for itching  Qty: 30 tablet, Refills: 0    Associated Diagnoses: CKD (chronic kidney disease) stage 4, GFR 15-29 ml/min (H)      levothyroxine (SYNTHROID/LEVOTHROID) 200 MCG tablet Take 1 tablet (200 mcg) by mouth daily  Qty: 90 tablet, Refills: 1    Associated Diagnoses: Other specified hypothyroidism      oxyCODONE IR (ROXICODONE) 10 MG tablet Take 0.5-1 tablets (5-10 mg) by mouth every 8 hours as needed for moderate to severe pain  Qty: 90 tablet, Refills: 0    Associated Diagnoses: Chronic low back pain without sciatica, unspecified back pain laterality      Alcohol Swabs (SM ALCOHOL PREP) 70 % PADS Externally apply 1 pad topically 4 times daily  Qty: 100 each, Refills: 11    Associated Diagnoses: Type 2 diabetes mellitus without complication, with long-term current use of insulin (H)      blood glucose monitoring (FREESTYLE LITE) test strip TEST BLOOD SUGAR TWO TIMES A DAY  Qty: 50 strip, Refills: 12    Associated Diagnoses: Type 2 diabetes mellitus without complication, with long-term current use of insulin (H)      blood glucose monitoring (FREESTYLE) lancets Test BS two times daily as directed  Qty: 100 each, Refills: 1    Associated Diagnoses: Type 2 diabetes mellitus without complication, with long-term current use of insulin (H)      Blood Pressure Monitoring (BLOOD PRESSURE CUFF) MISC 1 each 2 times daily  Qty: 1 each, Refills: 0    Associated Diagnoses: Chronic kidney disease, stage 4 (severe) (H)      Cholecalciferol (VITAMIN D) 2000 units tablet Take 2,000 Units by mouth  daily  Qty: 90 tablet, Refills: 3    Associated Diagnoses: Vitamin D deficiency disease      Emollient (EUCERIN CALMING DAILY MOIST) CREA Externally apply 1 dose. topically daily  Qty: 1 Tube, Refills: 12    Associated Diagnoses: Type II or unspecified type diabetes mellitus with neurological manifestations, not stated as uncontrolled(250.60) (H)      nitroGLYcerin (NITROSTAT) 0.4 MG sublingual tablet Place 1 tablet (0.4 mg) under the tongue every 5 minutes as needed for chest pain  Qty: 25 tablet, Refills: 0    Associated Diagnoses: History of MI (myocardial infarction)      nystatin (MYCOSTATIN) 242726 UNIT/GM POWD Apply 1 g topically 3 times daily as needed  Qty: 30 g, Refills: 1    Associated Diagnoses: Groin rash      !! order for DME Equipment being ordered: Ensure  Qty: 6 Can, Refills: 3    Associated Diagnoses: CKD (chronic kidney disease) stage 5, GFR less than 15 ml/min (H)      !! order for DME Equipment being ordered: cane  Qty: 1 each, Refills: 0    Associated Diagnoses: Non-compliant patient      !! order for DME Equipment being ordered: Diabetic Shoes  Qty: 1 each, Refills: 0    Associated Diagnoses: Type 2 diabetes mellitus with stage 5 chronic kidney disease not on chronic dialysis, without long-term current use of insulin (H)      !! order for DME Equipment being ordered: two pairs moderate knee high support hose  Qty: 2 Device, Refills: 1    Associated Diagnoses: Edema, unspecified type      !! order for DME Equipment being ordered: Compression socks.  Strength:15-20 mmHg  Qty: 2 each, Refills: 0    Associated Diagnoses: Localized edema      !! order for DME One wheeled walker with seat and brakes and basket  Qty: 1 Device, Refills: 0    Associated Diagnoses: Risk for falls       !! - Potential duplicate medications found. Please discuss with provider.      STOP taking these medications       hydrALAZINE (APRESOLINE) 25 MG tablet Comments:   Reason for Stopping:         LANTUS SOLOSTAR 100 UNIT/ML  soln Comments:   Reason for Stopping:         mometasone-formoterol (DULERA) 100-5 MCG/ACT inhaler Comments:   Reason for Stopping:         polyethylene glycol (MIRALAX) powder Comments:   Reason for Stopping:         sodium bicarbonate 650 MG tablet Comments:   Reason for Stopping:         tiotropium (SPIRIVA HANDIHALER) 18 MCG inhaled capsule Comments:   Reason for Stopping:         vitamin D2 (ERGOCALCIFEROL) 90211 units (1250 mcg) capsule Comments:   Reason for Stopping:             Allergies   Allergies   Allergen Reactions     Contrast Dye Hives and Itching     Clonidine      She had as IP and thinks it made her itchy     Diatrizoate Other (See Comments)     Diltiazem      Severe bradycardia     Iodine-131

## 2019-06-21 NOTE — PROVIDER NOTIFICATION
"Cross cover paged: \"FYI: Per tele monitor tech at 0210 pt had 9 beats of aberrant Afib and at 2048 6 beats of VT.\"  "

## 2019-06-21 NOTE — PROGRESS NOTES
"SPIRITUAL HEALTH SERVICES  [facility] ([place] 6D Observation  ON-CALL VISIT     REFERRAL SOURCE: On-call  visit with pt Kim, per request for hospital  visit as noted in initial nursing assessment.     Pt about to discharge - she asked for  visit \"to pray with me before I go. I haven't been able to say my prayers for a couple of days.\" Pt is [employer], she asked if I could provide her with a rosary. I provided a rosary for pt and said prayers with her.      PLAN: no further needs indicated before discharge     Darwin Mandujano M.Div (Bill)., [facility]  Staff   Pager [phone]                                                                                          "

## 2019-06-21 NOTE — PLAN OF CARE
Outpatient/Observation goals to be met before discharge home:   /59   Pulse 84   Temp 98.6  F (37  C) (Oral)   Resp 16   Wt 63.3 kg (139 lb 8.8 oz)   SpO2 95%   Breastfeeding? No   BMI 23.95 kg/m      1) Improved lung exam: Not met, lung sounds coarse  2) Seen by COPDcare team: Not met  3) Stable vital signs: Met  4) Patient feels safe to return to home: Pending

## 2019-06-21 NOTE — PROGRESS NOTES
Observation goals PRIOR TO DISCHARGE     Comments: 1) Improved lung exam   LS crackles but improved from yesterday.  Still ARAUZ but improved.  Pt unreliable historian, reports baseline activity tolerance at times, other times reports increased ARAUZ from baseline  2) Seen by COPDcare team at bedside this AM  3) Stable vital signs bitals stable.  On RA mid 90s  4) Patient feels safe to return to home   Nurse to notify provider when observation goals have been met and patient is ready for discharge.

## 2019-06-21 NOTE — PROGRESS NOTES
HEMODIALYSIS TREATMENT NOTE    Date: 6/20/2019  Time: 7:08 PM    Data:  Pre Wt: 64.2 kg - standing    Desired Wt: 63 kg   Post Wt: 63.3 kg - standing    Weight change: -0.9 kg (pt ate on HD)  Ultrafiltration - Post Run Net Total Removed (mL): 1200 mL  Ultrafiltration - Post Run Net Total Gain (mL): 0 mL  Vascular Access Status: Yes, secured and visible  Dialyzer Rinse: Streaked, Moderate  Total Blood Volume Processed: 53.3 Liters    Total Dialysis (Treatment) Time: 3 hrs      Lab:   None    Interventions/Assessment:  Stable HD tx via RCVC on K3 Ca3 bath with ordered . Keep MAP > 65 per order. Pt on 2L O2 via NC. Expiratory wheezes in upper lobes. Pt admitted with COPD exacerbation. Pt ate dinner and offered no complaints during treatment. Epogen given IV. Catheter dressing changed per protocol. In the last 5 minutes of treatment, pt had a brief episode of v tach. Pt has experienced this before. 1.2 kg removed to achieve EDW of 63 kg. CVC saline locked with clear guard caps and report called to primary 6D RN. EKG strip sent with patient's chart.       Plan:    Next HD tx per renal team.

## 2019-06-21 NOTE — PROVIDER NOTIFICATION
Cross cover paged in re: lasix dose following dialysis.  Advised to hold dose. Also informed of v. tach run during the end of dialysis.

## 2019-06-21 NOTE — CONSULTS
"Chronic Pulmonary Disease Specialist Consult   COPD Initial Interview    2019    Patient: Kim Anne      :  1945                    MRN:[id number]      Reason for Consult:  Patient with normal spirometry but with air trapping being followed by COPD Readmission Reduction Program    History of Present Illness: The patient initially reported to [place] ED and complained of \"chest feeling tight\" and shortness of breath.  Initial vital signs were notable for a temperature of 98.7, heart rate of 75, blood pressure of 88/58 with a respiratory rate of 16 satting 86% on room air.      Last PFT on 2017  FVC 2.15 L 81%  FEV1 1.52 L 73%  FEV1/FVC% 71%  Interpretation: Normal spirometry but with air trapping    Home respiratory medications include:  Spiriva Handihaler Daily  Dulera BID  Albuterol MDI Q6 prn      Assessment:  Patient was resting in bed comfortably with no shortness of breath or respiratory distress noted. BS clear, RR 18, and oxygen saturation 97% on RA.      Action:    -Evaluated patients inspiratory strength using In-Check device: Patient able to generate sufficient inspiratory flow for adequate drug deposition (For Albuterol MDI only). Kim was unable to generate enough inspiratory strength to use her Spiriva Handihaler    -Evaluated patients coordination and technique with inhaler: Patient demonstrates good technique with Albuterol inhaler. Kim did require repeated coaching.     -Patient able to generate a pressure of 10 cm H2O on Aerobika OPEP device for 2 seconds generating good strong productive cough. The goal for each breath, for a total of 3 sets of 10 breaths, is to exhale at 10-20 of pressure for 3-4 seconds without fatigue.      Recommendations:  -Continue with current inpatient respiratory medication schedule.    -Establish care with a Pulmonologist and get complete PFT's. Was seen by Dr. Packer in the past but has not been seen for > 2 years. Patient wishes " to reestablish care with Dr. Cisneros. I will send Down message to  for appointments.    -Use Aerobika Oscillating PEP Device for 3 sets of 10 breaths two times daily. Perform 2 to 3 'castrejon coughs'  to clear airway after each set. May use device with or without nebs. The use of this device will help open smaller airways, improve mucus clearance, decrease cough frequency, and improve exercise tolerance.    -Use Aerochamber with Albuterol rescue MDI's for better drug deposition.    -Patient needs continued reinforcement on inhaler use.    -2mg Nicotine Lozenges for cravings and urges. Patient states that she has quit smoking three weeks ago. We did talk about her cravings and the use of lozenges vs gum. She stated that she would prefer to use lozenges.    -PT/OT consult to assess safety with returning home, functional abilities and limitations, home care needs and cognitive evaluation    -Referral for outpatient pulmonary Rehab    -At discharge order Trelegy Ellipta Q Day. Continue Albuterol MDI Q6 prn. Discontinue Spiriva Handihaler and Dulera as these medications are included in the Trelegy. Patient was unable to generate inspiratory flow sufficient for the use of the Handihaler but does demonstrate strength to use the Ellipta device. Also may be more efficient for her as it is a one inhalation, one time a day medication. This will help with compliance. She required repeated instruction with her current medications.      Will continue to follow and support patient as needed. Will follow up with phone call 48 hours after discharge.     I spent 75 minutes with the patient.    JAME Xiong, RRT  Chronic Pulmonary Disease Specialist  Office: [phone]   Pager: [phone]       Repeat visit to show patient Ellipta device for her discharge inhaler and to complete instruction.  Kim demonstrates appropriate use of this device.

## 2019-06-21 NOTE — PLAN OF CARE
Outpatient/Observation goals to be met before discharge home:   /54   Pulse 84   Temp 98.8  F (37.1  C) (Oral)   Resp 16   Wt 63.3 kg (139 lb 8.8 oz)   SpO2 95%   Breastfeeding? No   BMI 23.95 kg/m      1) Improved lung exam:  Not met, lung sounds coarse  2) Seen by COPDcare team: Not met  3) Stable vital signs: Met  4) Patient feels safe to return to home: Pending

## 2019-06-21 NOTE — PROGRESS NOTES
piv removed.  Discharge instructions including f/u care, home care and medication changes/new medications reviewed with pt.  Pt to discharge after ziopatch applied.  Al pt questions answered.  Pt to discharge when transport arrives.

## 2019-06-21 NOTE — PROGRESS NOTES
Brief Nephrology Note    Pt seen and EMR Reviewed. Tolerated iHD yesterday per TTS schedule. Lytes acceptable this AM.     Will plan iHD tmrw if remains inpatient.     Please call with Kelsi.     GERALD Bowie  [phone]

## 2019-06-22 ENCOUNTER — PATIENT OUTREACH (OUTPATIENT)
Dept: CARE COORDINATION | Facility: CLINIC | Age: 74
End: 2019-06-22

## 2019-06-24 ENCOUNTER — ALLIED HEALTH/NURSE VISIT (OUTPATIENT)
Dept: FAMILY MEDICINE | Facility: CLINIC | Age: 74
End: 2019-06-24

## 2019-06-24 ENCOUNTER — TELEPHONE (OUTPATIENT)
Dept: RESPIRATORY THERAPY | Facility: CLINIC | Age: 74
End: 2019-06-24

## 2019-06-24 ENCOUNTER — PATIENT OUTREACH (OUTPATIENT)
Dept: GERIATRIC MEDICINE | Facility: CLINIC | Age: 74
End: 2019-06-24

## 2019-06-24 DIAGNOSIS — Z09 HOSPITAL DISCHARGE FOLLOW-UP: Primary | ICD-10-CM

## 2019-06-24 PROCEDURE — 99207 ZZC NO CHARGE NURSE ONLY: CPT | Performed by: NURSE PRACTITIONER

## 2019-06-24 NOTE — TELEPHONE ENCOUNTER
Spoke with Kim today for ongoing COPD education. She sounds good with no noticeable shortness of breath or respiratory distress. She stated that she is feeling a little better and has not had any new shortness of breath. She acknowledged that she needs to just slow down and rest more often. She had no further questions or concerns at this time. I reminded her of our call back number should she need to contact us prior to our next call.    JAME Xiong, RRT  Chronic Pulmonary Disease Specialist  Office: [phone]   Pager: [phone]

## 2019-06-24 NOTE — PROGRESS NOTES
[facility] Care Coordination Contact    Left voicemail with  home care, Cristal, to reschedule home visit.  Member was admitted to obs and needed to cancel previous co visit.   Batool Mai RN, BSN, PHN  [facility]  [phone]  Fax: [phone]

## 2019-06-24 NOTE — PROGRESS NOTES
Date: 6/24/2019 Status: Michael   Time: 2:00 PM Length: 60   Visit Type: RETURN [657] GÓMEZ:     Provider: Ailyn Nieves APRN CNP Department:  PRIMARY CARE     Patient has clinic visit within 24-48 hours of Discharge so no post DC follow up call is needed

## 2019-06-24 NOTE — PROGRESS NOTES
"Patient was late for hospital follow up with PCP-- provider requested that writer triage patient and assess for any immediate concerns. Writer went over ED/Discharge Protocol with patient. Patient reported that she went to the hospital because she took 3 meds she wasn't supposed to-- states that she isn't sure which ones she accidentally took. Writer noted some changes to inhalers and a couple added medications. Patient was very confused about medications. Writer went over inhalers and incentive spirometer with patient who seemed to have a better understanding. MTM referral placed d/t medication changes from recent hospitalization. Writer wrapped L arm loosely with ace bandage for protection-- informed patient to remove it at night and have staff re-wrap in the morning, patient verbalized understanding. Patient will see PCP on Friday.       ED/Discharge Protocol  How things are going for you since being back home.\"  Pt response: \"I'm doing alright. I'm getting around\"  Is patient experiencing symptoms that may require a hospital visit? No    Discharge Instructions  \"Let's review your discharge instructions.  Do you remember what is/are the follow-up recommendations?  Pt. Response: To see Ailyn today    \"Were you instructed to make a follow-up appointment with us?\"  Pt. Response: Yes.  Has appointment been made?   Yes (was late for this appointment, triaged, will schedule another appointment for PCP's earliest opening)    Medications  \"How many new medications were started during your recent hospitalization/ED visit?\" 2 or more - Epic MTM referral needed  \"How many of your current medicines changed (dose, timing, name, etc.) during your hospital/ED visit?\" 0-1  \"Do you have questions about any of your medications? No  \"Were you newly diagnosed with heart failure, COPD, diabetes or did you have a heart attack?\"  No  For patients on insulin: \"Did you start on insulin in the hospital or did you have your insulin dose " "changed?\"  No  Medication reconciliation completed? Yes  Was MTM referral place? Yes - \"[facility]  will call you within the next few days to schedule an appointment with an MTM pharmacist to discuss your medicines and make sure they are working as well as they possibly can for you. This visit can be done in a [facility] clinic or on the phone and is at no charge to you.\"    Call Summary    \"Do you have any questions or concerns about your condition or care plan at the moment?\"  \"Yes--  I keep bumping my arms and getting these tears on my arms\"  Triage nurse advice given: Keep arm wrapped with ace bandage/gauze for protection, use caution-- writer applied tegaderm to skin tears for added protection against re-opening.    Patient was in ER 3 in the past year.    \"If you have questions or things don't continue to improve, please let us know and call the clinic at [phone].  Even if the clinic is not open, triage nurses are available 24/7 to help you. Thank you for your time and take care!\"      Kallie Pritchard  06/24/19  2:55 PM            "

## 2019-06-25 ENCOUNTER — TELEPHONE (OUTPATIENT)
Dept: FAMILY MEDICINE | Facility: CLINIC | Age: 74
End: 2019-06-25

## 2019-06-25 NOTE — TELEPHONE ENCOUNTER
Pt met with triage nurse yesterday and mentioned that she would like an eye exam for her diabetes and to get her eye glasses checked out.   Called Kim and left message with my direct number to call back to schedule eye exam    Elizabet Hester    Care Management Coordinator - [facility]

## 2019-06-27 ENCOUNTER — TELEPHONE (OUTPATIENT)
Dept: PULMONOLOGY | Facility: CLINIC | Age: 74
End: 2019-06-27

## 2019-06-27 ENCOUNTER — DOCUMENTATION ONLY (OUTPATIENT)
Dept: FAMILY MEDICINE | Facility: CLINIC | Age: 74
End: 2019-06-27

## 2019-06-27 NOTE — TELEPHONE ENCOUNTER
Called Kim and assisted her in scheduling her eye exam for Monday 7/15/19 at 2:45pm    Elizabet Hester    Care Management Coordinator - [facility]

## 2019-06-27 NOTE — PROGRESS NOTES
[facility] now requests orders and shares plan of care/discharge summaries for some patients through [employer].  Please REPLY TO THIS MESSAGE OR ROUTE BACK TO THE AUTHOR in order to give authorization for orders when needed.  This is considered a verbal order, you will still receive a faxed copy of orders for signature.  Thank you for your assistance in improving collaboration for our patients.    ORDER    Dr. Mclain and Ailyn Nieves,    Requesting the following Home Care orders  SN 1w3, PRN    Ms. Anne also stated that [place] beginning [place] to visit family. And will return home the following week..    Thank you,  Remberto Hinojosa RN Case Manager   [phone]  [email or web address]

## 2019-06-27 NOTE — TELEPHONE ENCOUNTER
Clinic has tried contacting opt 3x, to reestablish and schedule appt for Pulmonary clinic,     EsquivelLionel 6/27/2019 12:47 PM CDT  called pt and spoke with son about scheudling appt, pt is sleeping and will call back when she can to reschedule, SV 6/27    Lionel Esquivel 6/25/2019  9:26 AM CDT  called pt and pt isnt home, pt family member said to call back around 11-12 to get a hold of her to schedule an appt SV 6/25    Lionel Esquivel 6/21/2019   Called pt and LVM about scheduling, left call back number [phone]

## 2019-06-27 NOTE — PROGRESS NOTES
[facility] Care Coordination Contact    Return call from Cristal RIVERA who states she has not been able to schedule with member because is having a hard time reaching member.  States she will try to coordinate for next week.  Batool Mai RN, BSN, PHN  [facility]  [phone]  Fax: [phone]

## 2019-06-28 ENCOUNTER — TELEPHONE (OUTPATIENT)
Dept: RESPIRATORY THERAPY | Facility: CLINIC | Age: 74
End: 2019-06-28

## 2019-06-28 ENCOUNTER — OFFICE VISIT (OUTPATIENT)
Dept: BEHAVIORAL HEALTH | Facility: CLINIC | Age: 74
End: 2019-06-28
Payer: COMMERCIAL

## 2019-06-28 ENCOUNTER — OFFICE VISIT (OUTPATIENT)
Dept: FAMILY MEDICINE | Facility: CLINIC | Age: 74
End: 2019-06-28
Payer: COMMERCIAL

## 2019-06-28 ENCOUNTER — TELEPHONE (OUTPATIENT)
Dept: FAMILY MEDICINE | Facility: CLINIC | Age: 74
End: 2019-06-28

## 2019-06-28 VITALS
OXYGEN SATURATION: 93 % | BODY MASS INDEX: 24.03 KG/M2 | HEART RATE: 75 BPM | RESPIRATION RATE: 16 BRPM | DIASTOLIC BLOOD PRESSURE: 68 MMHG | SYSTOLIC BLOOD PRESSURE: 140 MMHG | WEIGHT: 140 LBS | TEMPERATURE: 98.6 F

## 2019-06-28 DIAGNOSIS — J44.9 CHRONIC OBSTRUCTIVE PULMONARY DISEASE, UNSPECIFIED COPD TYPE (H): Primary | ICD-10-CM

## 2019-06-28 DIAGNOSIS — M54.50 CHRONIC LOW BACK PAIN WITHOUT SCIATICA, UNSPECIFIED BACK PAIN LATERALITY: ICD-10-CM

## 2019-06-28 DIAGNOSIS — F17.200 SMOKER: ICD-10-CM

## 2019-06-28 DIAGNOSIS — J44.9 CHRONIC OBSTRUCTIVE PULMONARY DISEASE, UNSPECIFIED COPD TYPE (H): ICD-10-CM

## 2019-06-28 DIAGNOSIS — Z92.89 HISTORY OF BEING HOSPITALIZED: Primary | ICD-10-CM

## 2019-06-28 DIAGNOSIS — F43.21 ADJUSTMENT DISORDER WITH DEPRESSED MOOD: Primary | ICD-10-CM

## 2019-06-28 DIAGNOSIS — N18.5 CKD (CHRONIC KIDNEY DISEASE) STAGE 5, GFR LESS THAN 15 ML/MIN (H): ICD-10-CM

## 2019-06-28 DIAGNOSIS — G89.29 CHRONIC LOW BACK PAIN WITHOUT SCIATICA, UNSPECIFIED BACK PAIN LATERALITY: ICD-10-CM

## 2019-06-28 PROCEDURE — 99215 OFFICE O/P EST HI 40 MIN: CPT | Performed by: NURSE PRACTITIONER

## 2019-06-28 RX ORDER — OXYCODONE HYDROCHLORIDE 10 MG/1
5-10 TABLET ORAL EVERY 8 HOURS PRN
Qty: 90 TABLET | Refills: 0 | Status: SHIPPED | OUTPATIENT
Start: 2019-07-10 | End: 2019-08-06

## 2019-06-28 RX ORDER — OXYCODONE HYDROCHLORIDE 10 MG/1
5-10 TABLET ORAL EVERY 8 HOURS PRN
Qty: 90 TABLET | Refills: 0 | Status: SHIPPED | OUTPATIENT
Start: 2019-07-10 | End: 2019-06-28

## 2019-06-28 RX ORDER — ALBUTEROL SULFATE 90 UG/1
2 AEROSOL, METERED RESPIRATORY (INHALATION) EVERY 6 HOURS PRN
Qty: 18 G | Refills: 3 | Status: ON HOLD | OUTPATIENT
Start: 2019-06-28 | End: 2020-02-28

## 2019-06-28 RX ORDER — POLYETHYLENE GLYCOL 3350 17 G
2 POWDER IN PACKET (EA) ORAL
Qty: 20 LOZENGE | Refills: 1 | Status: SHIPPED | OUTPATIENT
Start: 2019-06-28 | End: 2019-08-21

## 2019-06-28 NOTE — TELEPHONE ENCOUNTER
Reason for Call:  Formulary Issue    Detailed comments: Fax from [facility] received stating that the pt's Fluticasone-Umeclidin-Vilanterol (TRELEGY ELLIPTA) 100-62.5-25 MCG/INH oral inhaler does not fall on their formulary list. Allternatives are: Combo products: Advair diskis or Dulera, anticholinergic: Spiriva.    If none of the alternatives are an option then the pt will need to be sent to another pharmacy if you want her to stick with the Fluticasone-Umeclidin-Vilanterol (TRELEGY ELLIPTA) 100-62.5-25 MCG/INH oral inhaler    Phone Number: [facility] tel: [phone]    Best Time: anytime    Can we leave a detailed message on this number? Not Applicable    Call taken on 6/28/2019 at 5:16 PM by Sakina Fraga

## 2019-06-28 NOTE — TELEPHONE ENCOUNTER
Our team was called by primary clinic to assist with setting up appointment at [facility] for establishment of care as we recommended for her during her hospital stay. The  at [facility] have attempted three times to get a hold of her in order to get her on the schedule. I gave the caller the direct number for the  so that she could assist in setting up this appointment. We will reach out to Kim again in the next couple of weeks to see how she is doing.    JAME Xiong, RRT  Chronic Pulmonary Disease Specialist  Office: [phone]   Pager: [phone]

## 2019-06-28 NOTE — PROGRESS NOTES
SUBJECTIVE:                                                    Kim Anne is a 73 year old female who presents to clinic today for the following health issues:  [facility]: Don    Patient states someone cleaned her house for her and misplaced her inhalers and nicotine lozenges: Patient requesting refills on these medications.    Currently on Dialysis three times per week. T/Thu/Sat.     Patient has a nurse? that sets up her medications for her weekly.   Lives with Son/grand son and grand daughter.     Hypertension Follow-up  Patient denies any chest pain, shortness of breath, heart palpitations or syncopal episodes.  BP Readings from Last 3 Encounters:   06/28/19 140/68   06/21/19 138/55   05/30/19 (S) 179/63       Do you check your blood pressure regularly outside of the clinic? No     Are you following a low salt diet? Yes    Are your blood pressures ever more than 140 on the top number (systolic) OR more   than 90 on the bottom number (diastolic), for example 140/90?     Chronic Pain Follow-Up  Type / Location of Pain: low back pain   Analgesia/pain control:       Recent changes:  same      Overall control: Comfortably manageable  Activity level/function:      Daily activities:  Able to do light housework, cooking and Can do most things most days, with some rest    Work:  Unable to work  Adverse effects:  Yes   Adherance    Taking medication as directed?  Yes    Participating in other treatments: no -   Risk Factors:    Sleep:  Fair    Mood/anxiety:  controlled    Recent family or social stressors:  none     Other aggravating factors: prolonged sitting and prolonged standing  PHQ-9 SCORE 2/12/2018 3/12/2018 6/25/2018   PHQ-9 Total Score - - -   PHQ-9 Total Score - - -   PHQ-9 Total Score 0 0 0     JUSTINE-7 SCORE 12/12/2016 3/12/2018 6/25/2018   Total Score - - -   Total Score 0 0 0   Total Score - - -     Encounter-Level CSA - 04/10/2018:    Controlled Substance Agreement - Scan on 4/18/2018  3:13 PM: CONTROLLED  SUBSTANCE AGREEMENT (below)       Encounter-Level CSA - 02/13/2017:    Controlled Substance Agreement - Scan on 2/16/2017  2:30 PM: CONTROLLED SUBSTANCE AGREEMENT (below)       Encounter-Level CSA - 01/11/2017:    Controlled Substance Agreement - Scan on 1/16/2017  6:55 PM: CONTROLLED SUBSTANCE AGREEMENT (below)       Encounter-Level CSA - 03/07/2016:    Controlled Substance Agreement - Scan on 3/8/2016  4:25 AM: CONTROLLED SUBSTANCE AGREEMENT 03/07/16 (below)       Encounter-Level CSA - 03/04/2015:    Controlled Substance Agreement - Scan on 3/5/2015  9:09 AM: Controlled Substance Agreement 03/04/15 (below)       Patient-Level CSA:    There are no patient-level csa.         Amount of exercise or physical activity: daily walks.     Problems taking medications regularly: Yes,  lost prescription    Medication side effects: none    Diet: no potassium         Hospital Follow-up Visit:  Hospital/Nursing Home/IP Rehab Facility: [facility]  Date of Admission: 6/19/2019  Date of Discharge: 6/21/2019  Reason(s) for Admission:    1. COPD exacerbation   2. Paroxysmal SVT/NSVT   3. ESRD on HD   4. Hypertension   5. Hypothyroidism   6. Occluded left subclavian stent            Problems taking medications regularly:  Yes, missing her inhalers- needs new prescriptions.        Medication changes since discharge: trelegy ellipta, miralax, nicotine, and prednisone       Problems adhering to non-medication therapy:  None    Summary of hospitalization:  [facility] discharge summary reviewed  Diagnostic Tests/Treatments reviewed.  Follow up needed: CBC and BMP  Other Healthcare Providers Involved in Patient s Care:         Homecare, Care Coordination and Specialist appointment - as scheduled.   Update since discharge: improved.     Post Discharge Medication Reconciliation: discharge medications reconciled, continue medications without change.  Plan of care communicated with patient     Coding guidelines for  this visit:  Type of Medical   Decision Making Face-to-Face Visit       within 7 Days of discharge Face-to-Face Visit        within 14 days of discharge   Moderate Complexity [id number]   High Complexity 50023 [id number]              Mental Health Follow up: Bipolar    Status since last visit: manageable,     See PHQ-9 for current symptoms.  Other associated symptoms: None    Complicating factors: none   Significant life event:  No   Current substance abuse:  None  Anxiety or Panic symptoms:  No    Sleep - good, no issues.   Appetite - struggling with her appetite, requesting to be on ensure.   Exercise - walking with the aide of a walker.    Smoking - quit after last hospital admission. Struggling with cravings. Requesting to have her nicotine lozenges refilled.   Denies any illicit drug use.     PHQ-9  English PHQ-9   Any Language               Social History     Tobacco Use     Smoking status: Current Every Day Smoker     Packs/day: 0.25     Years: 40.00     Pack years: 10.00     Types: Cigarettes     Smokeless tobacco: Never Used   Substance Use Topics     Alcohol use: No     Alcohol/week: 0.0 oz        Problem list and histories reviewed & adjusted, as indicated.  Additional history: as documented    Patient Active Problem List   Diagnosis     Hyperlipidemia LDL goal <100     ARAUZ (dyspnea on exertion)     COPD (chronic obstructive pulmonary disease) (H)     Edema     Fatigue     History of MI (myocardial infarction)     Snores     Knee pain, left     Bunion of great toe of left foot     Dystrophic nail     Arthritis     Peripheral vascular disease (H)     Chronic low back pain     Health Care Home     CKD (chronic kidney disease) stage 4, GFR 15-29 ml/min (H)     Abnormal gait     Advance Care Planning     Vitamin D deficiency     Constipation     Hypertension goal BP (blood pressure) < 130/80     Bradycardia     Callus of foot     Other chronic pain     Cataracts, bilateral     Hyperopic astigmatism of both eyes      Presbyopia     Asymptomatic bunion, right     Acquired hypothyroidism     Type 2 diabetes mellitus with diabetic chronic kidney disease (H)     Hypertension associated with diabetes (H)     Hyperlipidemia associated with type 2 diabetes mellitus (H)     Obesity (BMI 30-39.9)     History of sick sinus syndrome     Nephrotic syndrome     Pneumonia     Grief     Cigarette nicotine dependence without complication     HCOM with LVOT     Hyperkalemia     Type 2 diabetes, HbA1C goal < 8% (H)     Smoker     Single kidney     Hypothyroid     Hypertension goal BP (blood pressure) < 140/90     Hyperlipidemia     Diabetic nephropathy (H)     Hypoalbuminemia     Skin lesion of hand     ERRONEOUS ENCOUNTER--DISREGARD     JUSTINE (generalized anxiety disorder)     Atypical chest pain     COPD exacerbation (H)     Past Surgical History:   Procedure Laterality Date     APPENDECTOMY       BLEPHAROPLASTY BILATERAL  9/18/2013    Procedure: BLEPHAROPLASTY BILATERAL;  BILATERAL UPPER EYELID BLEPHAROPLASTY ;  Surgeon: Olayinka Lyon MD;  Location:  [facility]     CATARACT IOL, RT/LT Bilateral 2016     CHOLECYSTECTOMY       COLONOSCOPY  7/15/2011    polyps repeat in 5 years     CV CORONARY ANGIOGRAM N/A 5/23/2019    Procedure: Coronary Angiogram;  Surgeon: Kunal Nj MD;  Location: 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    donated left kideny     OVARY SURGERY      left for cyst benign     subclavian stent  august 2010    [facility]       Social History     Tobacco Use     Smoking status: Current Every Day Smoker     Packs/day: 0.25     Years: 40.00     Pack years: 10.00     Types: Cigarettes     Smokeless tobacco: Never Used   Substance Use Topics     Alcohol use: No     Alcohol/week: 0.0 oz     Family History   Problem Relation Age of Onset     Diabetes Mother         brother, MGM, sister      Kidney Disease Brother         X2 DM two      Alcohol/Drug Child         daughter     Asthma No family hx of      C.A.D. No family hx of      Hypertension No family hx of      Cerebrovascular Disease No family hx of      Breast Cancer No family hx of      Cancer - colorectal No family hx of      Prostate Cancer No family hx of      Allergies No family hx of      Alzheimer Disease No family hx of      Anesthesia Reaction No family hx of      Arthritis No family hx of      Blood Disease No family hx of      Cancer No family hx of      Cardiovascular No family hx of      Circulatory No family hx of      Congenital Anomalies No family hx of      Connective Tissue Disorder No family hx of      Depression No family hx of      Eye Disorder No family hx of      Genetic Disorder No family hx of      Gastrointestinal Disease No family hx of      Genitourinary Problems No family hx of      Gynecology No family hx of      Heart Disease No family hx of      Lipids No family hx of      Musculoskeletal Disorder No family hx of      Neurologic Disorder No family hx of      Obesity No family hx of      Osteoporosis No family hx of      Psychotic Disorder No family hx of      Respiratory No family hx of      Thyroid Disease No family hx of      Glaucoma No family hx of      Macular Degeneration No family hx of            Current Outpatient Medications   Medication Sig Dispense Refill     acetaminophen (TYLENOL) 325 MG tablet Take 2 tablets (650 mg) by mouth every 4 hours as needed for mild pain       albuterol (PROAIR HFA/PROVENTIL HFA/VENTOLIN HFA) 108 (90 Base) MCG/ACT inhaler Inhale 2 puffs into the lungs every 6 hours as needed for shortness of breath / dyspnea or wheezing 18 g 3     Alcohol Swabs (SM ALCOHOL PREP) 70 % PADS Externally apply 1 pad topically 4 times daily 100 each 11     ammonium lactate (LAC-HYDRIN) 12 % external lotion Apply topically 2 times daily 225 g 0     ASPIR-LOW 81 MG EC tablet Take 1 tablet (81  mg) by mouth daily 90 tablet 3     atorvastatin (LIPITOR) 40 MG tablet Take 1 tablet (40 mg) by mouth daily 90 tablet 1     blood glucose monitoring (FREESTYLE LITE) test strip TEST BLOOD SUGAR TWO TIMES A DAY 50 strip 12     blood glucose monitoring (FREESTYLE) lancets Test BS two times daily as directed 100 each 1     Blood Pressure Monitoring (BLOOD PRESSURE CUFF) MISC 1 each 2 times daily 1 each 0     Cholecalciferol (VITAMIN D) 2000 units tablet Take 2,000 Units by mouth daily 90 tablet 3     diltiazem ER COATED BEADS (CARDIZEM CD/CARTIA XT) 240 MG 24 hr capsule Take 1 capsule (240 mg) by mouth daily 30 capsule 0     docusate sodium (COLACE) 100 MG capsule Take 1 capsule (100 mg) by mouth 2 times daily 60 capsule 4     Emollient (EUCERIN CALMING DAILY MOIST) CREA Externally apply 1 dose. topically daily 1 Tube 12     fluticasone (FLONASE) 50 MCG/ACT nasal spray Spray 1 spray into both nostrils daily 9.9 mL 1     Fluticasone-Umeclidin-Vilanterol (TRELEGY ELLIPTA) 100-62.5-25 MCG/INH oral inhaler Inhale 1 puff into the lungs daily 1 Inhaler 0     furosemide (LASIX) 20 MG tablet Take 2 tablets (40 mg) by mouth 2 times daily 180 tablet 0     hydrOXYzine (ATARAX) 10 MG tablet Take 1 tablet (10 mg) by mouth 3 times daily as needed for itching 30 tablet 0     levothyroxine (SYNTHROID/LEVOTHROID) 200 MCG tablet Take 1 tablet (200 mcg) by mouth daily 90 tablet 1     nicotine (COMMIT) 2 MG lozenge Place 1 lozenge (2 mg) inside cheek every hour as needed for smoking cessation 20 lozenge 1     nitroGLYcerin (NITROSTAT) 0.4 MG sublingual tablet Place 1 tablet (0.4 mg) under the tongue every 5 minutes as needed for chest pain 25 tablet 0     nystatin (MYCOSTATIN) 491162 UNIT/GM POWD Apply 1 g topically 3 times daily as needed 30 g 1     order for DME Equipment being ordered: Ensure 6 Can 3     order for DME Equipment being ordered: cane 1 each 0     order for DME Equipment being ordered: Diabetic Shoes 1 each 0     order for  DME Equipment being ordered: two pairs moderate knee high support hose 2 Device 1     order for DME Equipment being ordered: Compression socks.  Strength:15-20 mmHg 2 each 0     order for DME One wheeled walker with seat and brakes and basket 1 Device 0     oxyCODONE IR (ROXICODONE) 10 MG tablet Take 0.5-1 tablets (5-10 mg) by mouth every 8 hours as needed for moderate to severe pain 90 tablet 0     polyethylene glycol (MIRALAX/GLYCOLAX) packet Take 17 g by mouth daily as needed for constipation 10 packet 0     Allergies   Allergen Reactions     Contrast Dye Hives and Itching     Clonidine      She had as IP and thinks it made her itchy     Diatrizoate Other (See Comments)     Diltiazem      Severe bradycardia     Iodine-131      Recent Labs   Lab Test 06/21/19  0610 06/20/19  1400 06/19/19  2209  05/23/19  0445 05/22/19  0335  05/21/19  0159  05/01/19  1748  01/04/19  1426  10/19/18  0939  07/26/18  1116  10/25/17  0639  09/07/17  1135   A1C  --   --   --   --   --   --   --   --   --  5.2  --  5.5  --   --   --  5.8*   < > 6.6*  --  6.4*   LDL  --   --   --   --   --   --   --   --   --   --   --   --   --  46  --   --   --  104*  --  81   HDL  --   --   --   --   --   --   --   --   --   --   --   --   --  77  --   --   --  67  --  75   TRIG  --   --   --   --   --   --   --   --   --   --   --   --   --  188*  --   --   --  212*  --  283*   ALT  --   --  17  --  10 10  --   --    < >  --   --   --   --   --   --   --   --  16  --  18   CR 2.33*  --  3.27*   < > 2.97* 2.24*   < >  --    < >  --    < >  --    < >  --    < >  --    < > 2.77*   < > 2.70*   GFRESTIMATED 20*  --  13*   < > 15* 21*   < >  --    < >  --    < >  --    < >  --    < >  --    < > 17*   < > 17*   GFRESTBLACK 23*  --  15*   < > 17* 24*   < >  --    < >  --    < >  --    < >  --    < >  --    < > 20*   < > 21*   POTASSIUM 4.1 4.1 3.6   < > 3.8 3.8   < >  --    < > 6.4*   < >  --    < >  --    < >  --    < > 4.3   < > 6.2*   TSH  --  0.35*  --    --   --   --   --  15.80*  --   --   --   --   --   --   --  1.12  --   --    < > 71.77*    < > = values in this interval not displayed.      BP Readings from Last 3 Encounters:   06/21/19 138/55   05/30/19 (S) 179/63   05/08/19 144/72    Wt Readings from Last 3 Encounters:   06/21/19 64 kg (141 lb)   05/30/19 64.8 kg (142 lb 13.7 oz)   05/08/19 76.7 kg (169 lb)        Labs reviewed in EPIC  Problem list, Medication list, Allergies, and Medical/Social/Surgical histories reviewed in [facility] and updated as appropriate.     ROS: Constitutional, neuro, ENT, endocrine, pulmonary, cardiac, gastrointestinal, genitourinary, musculoskeletal, integument and psychiatric systems are negative, except as otherwise noted above in the HPI.   OBJECTIVE:                                                    /68   Pulse 75   Temp 98.6  F (37  C)   Resp 16   Wt 63.5 kg (140 lb)   SpO2 93%   BMI 24.03 kg/m    Body mass index is 24.03 kg/m .  Physical Exam   Constitutional: She is oriented to person, place, and time. She appears well-developed.   Eyes: Pupils are equal, round, and reactive to light.   Cardiovascular: Normal rate.   Pulmonary/Chest: Effort normal and breath sounds normal.   Abdominal: Soft. Bowel sounds are normal.   Musculoskeletal: Normal range of motion.   Neurological: She is alert and oriented to person, place, and time.   Psychiatric: She has a normal mood and affect. Her behavior is normal. Judgment and thought content normal.      Mental Status Assessment:  Appearance:   Appropriate   Eye Contact:   Good   Psychomotor Behavior: Normal   Attitude:   Cooperative   Orientation:   All  Speech   Rate / Production: Normal    Volume:  Normal   Mood:    Normal  Affect:    Appropriate   Thought Content:  Clear   Thought Form:  Coherent  Logical   Insight:    Good   Attention Span and Concentration:appropriate  Recent and Remote Memory:  intact  Fund of Knowledge: appropriate  Muscle Strength and Tone: normal with  generalized weakness.       Diagnostic Test Results:  Labs reviewed in Epic     ASSESSMENT/PLAN:                                                    (Z92.89) History of being hospitalized  (primary encounter diagnosis)  Comment: see HPI. Patient needs some follow-up labs post hospitalization.  Patient reports improvement since discharge from the hospital.  Plan: CBC with platelets and differential,         Comprehensive metabolic panel (BMP + Alb, Alk         Phos, ALT, AST, Total. Bili, TP)        -Follow-up on labs.    (N18.5) CKD (chronic kidney disease) stage 5, GFR less than 15 ml/min (H)  Comment: Patient is going on dialysis 3 times a week reports increased tiredness and fatigue after procedure we will continue to monitor.   Plan: order for DME        Order to ensure no replacements for when patient is unable to eat or has decreased appetite.     (J44.9) Chronic obstructive pulmonary disease, unspecified COPD type (H)  Comment: Stable on current inhalers, patient will need to be rescheduled to see pulmonary for follow-up appointment.  Plan: albuterol (PROAIR HFA/PROVENTIL HFA/VENTOLIN         HFA) 108 (90 Base) MCG/ACT inhaler,         Fluticasone-Umeclidin-Vilanterol (TRELEGY         ELLIPTA) 100-62.5-25 MCG/INH oral inhaler        Refills are done.    (F17.200) Smoker  Comment: Patient reports that she quit smoking after she was admitted to the hospital.  Is still struggling with cravings.  Reports that the lozenges were helping her but she has been missing them since she came back home.  Plan: nicotine (COMMIT) 2 MG lozenge        Refill sent to pharmacy.    (M54.5,  G89.29) Chronic low back pain without sciatica, unspecified back pain laterality  Comment: Patient on chronic opiates low back pain; no misuse of pain medications or early refill requests.  Plan: oxyCODONE IR (ROXICODONE) 10 MG tablet,         DISCONTINUED: oxyCODONE IR (ROXICODONE) 10 MG         tablet        -Medication refilled and hard copy  given to patient.      Patient Instructions   -Refills done.   -Schedule with lung doctors.   -Clinic will call you about ensure.     I spent Greater than 40 minutes was spent face to face with Kim Anne, with greater than 50 % in counselling and consultation about post hospitalization, chronic kidney disease, COPD, chronic back pain and smoking cessation      JUNIOR Lopez [facility] PRIMARY CARE

## 2019-07-01 DIAGNOSIS — Z53.9 DIAGNOSIS NOT YET DEFINED: Primary | ICD-10-CM

## 2019-07-01 PROCEDURE — G0180 MD CERTIFICATION HHA PATIENT: HCPCS | Performed by: FAMILY MEDICINE

## 2019-07-01 NOTE — TELEPHONE ENCOUNTER
It appears the patient was on Dulera previously and Trelegy is hard to get covered now so will switch her back to Dulera.      Annamarie SARAHP

## 2019-07-02 ENCOUNTER — PATIENT OUTREACH (OUTPATIENT)
Dept: GERIATRIC MEDICINE | Facility: CLINIC | Age: 74
End: 2019-07-02

## 2019-07-02 NOTE — PROGRESS NOTES
[facility] Care Coordination Contact    Was contact by [facility] NICOLE Bee to reschedule co visit.  Home care and CM will see member on 9/7 at 1pm in her home.  Batool Mai RN, BSN, PHN  [facility]  [phone]  Fax: [phone]

## 2019-07-02 NOTE — TELEPHONE ENCOUNTER
LM at home number that an RX is ready for her at Pharmacy & that [facility] will be calling (re: ensure).  Roxanne parrish RN

## 2019-07-03 ENCOUNTER — TELEPHONE (OUTPATIENT)
Dept: FAMILY MEDICINE | Facility: CLINIC | Age: 74
End: 2019-07-03

## 2019-07-03 NOTE — TELEPHONE ENCOUNTER
Faxed order to [facility]. Document sent to abstract for scanning.    Catherine Hatfield MA

## 2019-07-03 NOTE — TELEPHONE ENCOUNTER
Faxed order to [facility] - [phone]. Documents sent to abstract for scanning.    Catherine Hatfield MA

## 2019-07-03 NOTE — TELEPHONE ENCOUNTER
Faxed orders to [facility] to [phone]. Document sent to abstract for scanning.    Catherine Hatfield MA

## 2019-07-03 NOTE — TELEPHONE ENCOUNTER
Faxed prescription request to [facility] - [phone] Documents sent to abstract for scanning.     Catherine Hatfield MA

## 2019-07-10 ENCOUNTER — TELEPHONE (OUTPATIENT)
Dept: RESPIRATORY THERAPY | Facility: CLINIC | Age: 74
End: 2019-07-10

## 2019-07-10 NOTE — TELEPHONE ENCOUNTER
Attempted to reach Kim for ongoing COPD education/support. There was no answer so a message was left for her that included our call back number should she need to contact us prior to our next call.    JAME Xiong, RRT  Chronic Pulmonary Disease Specialist  Office: [phone]   Pager: [phone]

## 2019-07-15 ENCOUNTER — OFFICE VISIT (OUTPATIENT)
Dept: OPHTHALMOLOGY | Facility: CLINIC | Age: 74
End: 2019-07-15
Payer: COMMERCIAL

## 2019-07-15 DIAGNOSIS — H52.4 PRESBYOPIA: ICD-10-CM

## 2019-07-15 DIAGNOSIS — Z79.4 TYPE 2 DIABETES MELLITUS WITH MICROALBUMINURIA, WITH LONG-TERM CURRENT USE OF INSULIN (H): ICD-10-CM

## 2019-07-15 DIAGNOSIS — H04.123 DRY EYES, BILATERAL: ICD-10-CM

## 2019-07-15 DIAGNOSIS — H52.13 MYOPIC ASTIGMATISM OF BOTH EYES: ICD-10-CM

## 2019-07-15 DIAGNOSIS — E11.29 TYPE 2 DIABETES MELLITUS WITH MICROALBUMINURIA, WITH LONG-TERM CURRENT USE OF INSULIN (H): ICD-10-CM

## 2019-07-15 DIAGNOSIS — R80.9 TYPE 2 DIABETES MELLITUS WITH MICROALBUMINURIA, WITH LONG-TERM CURRENT USE OF INSULIN (H): ICD-10-CM

## 2019-07-15 DIAGNOSIS — E11.9 DIABETES MELLITUS TYPE 2 WITHOUT RETINOPATHY (H): Primary | ICD-10-CM

## 2019-07-15 DIAGNOSIS — H52.203 MYOPIC ASTIGMATISM OF BOTH EYES: ICD-10-CM

## 2019-07-15 DIAGNOSIS — Z96.1 PSEUDOPHAKIA, BOTH EYES: ICD-10-CM

## 2019-07-15 PROCEDURE — 92015 DETERMINE REFRACTIVE STATE: CPT | Mod: ZF

## 2019-07-15 PROCEDURE — G0463 HOSPITAL OUTPT CLINIC VISIT: HCPCS | Mod: ZF

## 2019-07-15 ASSESSMENT — REFRACTION_WEARINGRX
OD_SPHERE: -1.00
OS_CYLINDER: +0.75
OS_SPHERE: -0.75
OS_ADD: +2.50
OD_ADD: +2.50
SPECS_TYPE: BIF
OD_AXIS: 070
OS_AXIS: 105
OD_CYLINDER: +0.50

## 2019-07-15 ASSESSMENT — REFRACTION_MANIFEST
OS_CYLINDER: SPHERE
OS_ADD: +2.50
OS_SPHERE: -0.50
OD_SPHERE: -0.25
OD_AXIS: 100
OD_ADD: +2.50
OD_CYLINDER: +0.25

## 2019-07-15 ASSESSMENT — TONOMETRY
OD_IOP_MMHG: 10
OS_IOP_MMHG: 11
IOP_METHOD: TONOPEN

## 2019-07-15 ASSESSMENT — VISUAL ACUITY
METHOD: SNELLEN - LINEAR
OD_CC: 40+2
OS_CC: 20/25

## 2019-07-15 ASSESSMENT — SLIT LAMP EXAM - LIDS
COMMENTS: NORMAL
COMMENTS: NORMAL

## 2019-07-15 ASSESSMENT — CUP TO DISC RATIO
OD_RATIO: 0.2
OS_RATIO: 0.2

## 2019-07-15 ASSESSMENT — CONF VISUAL FIELD
OS_NORMAL: 1
OD_NORMAL: 1

## 2019-07-15 ASSESSMENT — EXTERNAL EXAM - LEFT EYE: OS_EXAM: NORMAL

## 2019-07-15 ASSESSMENT — EXTERNAL EXAM - RIGHT EYE: OD_EXAM: NORMAL

## 2019-07-15 NOTE — PROGRESS NOTES
HPI  Kim Anne is a 73 year old female here for diabetic eye exam. Endorsing increased trouble/blurriness with distance vision. Using bifocals, reports feeling like eyes are aching and strained after trying to read up close.  States in morning eyes have mucous surrounding, will remain during day sometimes. Endorsing some dryness, tried eye drops at last visit, was using AT 3x per day with not improvement. Denies redness.  Reports diabetes is well controlled, no longer on insulin or medications, controlled through diet. A1C 5.2 on 5/1/19.    Assessment & Plan    (E11.9) Diabetes mellitus type 2 without retinopathy (HCC)  (primary encounter diagnosis)  Comment: Diet controlled. Last A1c 5.2 5/1/19. No diabetic retinopathy  Plan: Discussed the importance of tight blood glucose control in the prevention of diabetic retinopathy. Recommend yearly dilated eye exam.    (Z96.1) Pseudophakia, both eyes  Comment: Clear visual axis  Plan: Observe    (H04.123) Dry eyes, bilateral  Comment: right eye > left eye, causing some blurring  Plan: Start warm compresses both eyes BID, change to gel tears at least 3x daily    (H52.203) Myopic astigmatism of both eyes  (H52.4) Presbyopia  Comment: Improved vision with refraction  Plan: Given updated glasses Rx.      -----------------------------------------------------------------------------------    Patient disposition:   Return in about 1 year (around 7/15/2020). or sooner as needed.    Teaching statement:  Complete documentation of historical and exam elements from today's encounter can be found in the full encounter summary report (not reduplicated in this progress note). I personally obtained the chief complaint(s) and history of present illness.  I confirmed and edited as necessary the review of systems, past medical/surgical history, family history, social history, and examination findings as documented by others; and I examined the patient myself. I personally reviewed  the relevant tests, images, and reports as documented above.     I formulated and edited as necessary the assessment and plan and discussed the findings and management plan with the patient and family.      Cyn Gabriel MD  Comprehensive Ophthalmology & Ocular Pathology  Department of Ophthalmology and Visual Neurosciences  [email or web address]  Pager [phone]

## 2019-07-15 NOTE — PATIENT INSTRUCTIONS
Blepharitis    What is blepharitis?  Blepharitis is a common problem and although it does not result in blindness, it can contribute to red, irritated eyes. In the most common form of blepharitis, the eyelids are reddened and somewhat swollen and scaly appearing at the base of the lashes. As the scales become more coarse, the surface of the eye becomes irritated, forming crusts, which may cause the eyelids to stick together upon waking up in the morning. If the debris falls into your eye, you may feel like you have  something in your eye  or experience a gritty sensation.     Treating blepharitis  Ophthalmologists at the [facility] recommend controlling the problem through eyelid hygiene.   Eyelid hygiene is cleansing of the lid to promote a normal ocular surface. It is important to cleanse so that it does not develop into a more serious condition. Blepharitis cannot be cured, but with eyelid hygiene done on a daily basis, the symptoms may be controlled.   Cleansing the eye  Hot Compresses: perform 2 times daily, or as ordered by your doctor      A.  Soak clean washcloth in hot water  OR      B.  Commercially available hot pack  OR      C.  Dry rice in a clean sock (dress sock or other thin sock is best), microwave until hot (20-30 seconds)      - Place hot compress on closed eyelid for 3-5 minutes (make sure not too hot)   - Gently massage eyelids for 30 seconds    Eyelid Scrubs:   a. Make solution with   water +   baby shampoo OR  b. In shower: place baby shampoo on fingertips OR  c. Steri-Lid cleansing solution    -  Use solution of choice on fingertips to  brush  your eyelids (like brushing teeth) for 30 seconds  -  Rinse eyelids and eyelashes with clean water  - Wipe dry with clean, dry cloth (bernard cloth is best)      Additional care for blepharitis  Keep your hands and face clean. Be careful not to touch or rub your eyes with soiled handkerchiefs, dirty fingers, etc. Women should avoid the use  of eye makeup during the early stages of treatment. When cosmetic use is resumed, replace liquid products because your old products may be contaminated. Remove all eye makeup before bedtime.   Occasionally, medication may be prescribed to treat blepharitis, but only your doctor can decide if you are a candidate for this treatment.

## 2019-07-16 ENCOUNTER — TELEPHONE (OUTPATIENT)
Dept: FAMILY MEDICINE | Facility: CLINIC | Age: 74
End: 2019-07-16

## 2019-07-16 NOTE — TELEPHONE ENCOUNTER
Faxed order form to [phone]. Documents sent to abstract for scanning.    Catherine Hatfield MA

## 2019-07-17 ENCOUNTER — DOCUMENTATION ONLY (OUTPATIENT)
Dept: CARE COORDINATION | Facility: CLINIC | Age: 74
End: 2019-07-17

## 2019-07-18 LAB
MYCOBACTERIUM SPEC CULT: NORMAL
MYCOBACTERIUM SPEC CULT: NORMAL
SPECIMEN SOURCE: NORMAL

## 2019-07-23 ENCOUNTER — TELEPHONE (OUTPATIENT)
Dept: RESPIRATORY THERAPY | Facility: CLINIC | Age: 74
End: 2019-07-23

## 2019-07-23 ENCOUNTER — TELEPHONE (OUTPATIENT)
Dept: FAMILY MEDICINE | Facility: CLINIC | Age: 74
End: 2019-07-23

## 2019-07-23 DIAGNOSIS — N18.4 CKD (CHRONIC KIDNEY DISEASE) STAGE 4, GFR 15-29 ML/MIN (H): ICD-10-CM

## 2019-07-23 RX ORDER — FUROSEMIDE 20 MG
40 TABLET ORAL
Qty: 180 TABLET | Refills: 0 | Status: CANCELLED | OUTPATIENT
Start: 2019-07-23

## 2019-07-23 NOTE — TELEPHONE ENCOUNTER
"**Faxed request noted \"Sending to primary doctor as requested by Dr. Zapata.\" Rx request was addressed to Dr. Baldwin, however while provider's been on leave, patient has been seeing Ezequiel**    Reason for Fax:  Medication or medication refill:    Do you use a [facility]?  Name of the pharmacy and phone number for the current request:  [facility] - [place], [place] - [address] [facility] [address] [facility]    P: [phone]   F: [phone]    Name of the medication requested: Furosemide (Lasix) 20 mg tablet  Requested Prescriptions   Pending Prescriptions Disp Refills     furosemide (LASIX) 20 MG tablet 180 tablet 0     Sig: Take 2 tablets (40 mg) by mouth 2 times daily       There is no refill protocol information for this order   Last Written Prescription Date:  6/21/2019  Last Fill Quantity: 180,  # refills: 0   Last office visit: 6/28/2019 with prescribing provider:  Ailyn PACHECO CNP   Future Office Visit: 7/26/2019  Next 5 appointments (look out 90 days)    Jul 26, 2019 11:00 AM CDT  Return Visit with JUNIOR Bishop CNP  [facility] ([facility]) [address]  [address]  [facility] [id number] [phone]   Jul 26, 2019 11:00 AM CDT  Return Visit with LAMINE Rodriguez  [facility] ([facility]) [address]  [address]  [facility] [id number] [phone]            Fax taken on 7/23/2019 at 3:42 PM by Nellie Graves      "

## 2019-07-23 NOTE — TELEPHONE ENCOUNTER
Attempted to contact Kim today via phone for ongoing COPD education. There was no answer so a message was left for her that included our call back number should she need to contact us prior to our next call.    JAME Xiong, RRT  Chronic Pulmonary Disease Specialist  Office: [phone]   Pager: [phone]

## 2019-07-25 DIAGNOSIS — N18.4 CKD (CHRONIC KIDNEY DISEASE) STAGE 4, GFR 15-29 ML/MIN (H): ICD-10-CM

## 2019-07-25 RX ORDER — FUROSEMIDE 20 MG
40 TABLET ORAL
Qty: 180 TABLET | Refills: 0 | Status: SHIPPED | OUTPATIENT
Start: 2019-07-25 | End: 2019-08-21

## 2019-07-26 ENCOUNTER — OFFICE VISIT (OUTPATIENT)
Dept: BEHAVIORAL HEALTH | Facility: CLINIC | Age: 74
End: 2019-07-26
Payer: COMMERCIAL

## 2019-07-26 ENCOUNTER — OFFICE VISIT (OUTPATIENT)
Dept: FAMILY MEDICINE | Facility: CLINIC | Age: 74
End: 2019-07-26
Payer: COMMERCIAL

## 2019-07-26 VITALS
TEMPERATURE: 97.9 F | OXYGEN SATURATION: 93 % | BODY MASS INDEX: 22.14 KG/M2 | RESPIRATION RATE: 18 BRPM | HEART RATE: 79 BPM | WEIGHT: 129 LBS | DIASTOLIC BLOOD PRESSURE: 58 MMHG | SYSTOLIC BLOOD PRESSURE: 140 MMHG

## 2019-07-26 DIAGNOSIS — E11.22 TYPE 2 DIABETES MELLITUS WITH STAGE 4 CHRONIC KIDNEY DISEASE, WITHOUT LONG-TERM CURRENT USE OF INSULIN (H): ICD-10-CM

## 2019-07-26 DIAGNOSIS — M54.50 CHRONIC MIDLINE LOW BACK PAIN WITHOUT SCIATICA: ICD-10-CM

## 2019-07-26 DIAGNOSIS — F43.22 ADJUSTMENT DISORDER WITH ANXIOUS MOOD: Primary | ICD-10-CM

## 2019-07-26 DIAGNOSIS — N18.4 CKD (CHRONIC KIDNEY DISEASE) STAGE 4, GFR 15-29 ML/MIN (H): Primary | ICD-10-CM

## 2019-07-26 DIAGNOSIS — I10 HYPERTENSION GOAL BP (BLOOD PRESSURE) < 140/90: ICD-10-CM

## 2019-07-26 DIAGNOSIS — G89.29 CHRONIC MIDLINE LOW BACK PAIN WITHOUT SCIATICA: ICD-10-CM

## 2019-07-26 DIAGNOSIS — N18.4 TYPE 2 DIABETES MELLITUS WITH STAGE 4 CHRONIC KIDNEY DISEASE, WITHOUT LONG-TERM CURRENT USE OF INSULIN (H): ICD-10-CM

## 2019-07-26 DIAGNOSIS — N18.5 CKD (CHRONIC KIDNEY DISEASE) STAGE 5, GFR LESS THAN 15 ML/MIN (H): ICD-10-CM

## 2019-07-26 PROCEDURE — 99214 OFFICE O/P EST MOD 30 MIN: CPT | Performed by: NURSE PRACTITIONER

## 2019-07-26 PROCEDURE — 90832 PSYTX W PT 30 MINUTES: CPT | Performed by: SOCIAL WORKER

## 2019-07-26 NOTE — Clinical Note
Good evening, Kim is a mutual patient and am wondering if it would be ok for her to be on her furosemide at 40 mg in the morning only. Am concerned about her falling- she states that she has to get up about 2-3 times at night to use the bathroom. Currently on Furosemide 40 mg BID. Am also having her schedule a follow-up appointment with nephrology. JUNIOR Ragland CNP

## 2019-07-26 NOTE — PROGRESS NOTES
SUBJECTIVE:                                                    Kim Anne is a 73 year old female who presents to clinic today for the following health issues:  [facility]: [facility]    Concerns: Patient is complaining about losing weight and increased hair loss. Patient also notes that the taste in her mouth is not as it used to be and same as her smell- states that she was told that this will improve with time.     Diabetes Follow-up    How often are you checking your blood sugar? A few times a week    What time of day are you checking your blood sugars (select all that apply)?  Before meals    Have you had any blood sugars above 200?  No    Have you had any blood sugars below 70?  No    What symptoms do you notice when your blood sugar is low?  None    What concerns do you have today about your diabetes? None     Do you have any of these symptoms? (Select all that apply)  Weight loss     Have you had a diabetic eye exam in the last 12 months? Yes- Date of last eye exam: last month    BP Readings from Last 2 Encounters:   06/28/19 140/68   06/21/19 138/55     Hemoglobin A1C (%)   Date Value   05/01/2019 5.2   01/04/2019 5.5     LDL Cholesterol Calculated (mg/dL)   Date Value   10/19/2018 46   10/25/2017 104 (H)       Diabetes Management Resources    Chronic Pain Follow-Up     Type / Location of Pain: Chronic low back pain without sciatica  Analgesia/pain control:       Recent changes:  Up and down      Overall control: tolerable sometimes but sometimes unbearable  Activity level/function:      Daily activities:  Unable to perform most daily activities - chores, hobbies, social activities, driving    Work:  not applicable  Adverse effects:  Yes can not get anything done without her pain medication.   Adherance    Taking medication as directed?  Yes    Participating in other treatments: walking  Risk Factors:    Sleep:  Poor    Mood/anxiety:  controlled    Recent family or social stressors:  none noted    Other  aggravating factors: repetitive activities - chores housework  PHQ-9 SCORE 2/12/2018 3/12/2018 6/25/2018   PHQ-9 Total Score - - -   PHQ-9 Total Score - - -   PHQ-9 Total Score 0 0 0     JUSTINE-7 SCORE 12/12/2016 3/12/2018 6/25/2018   Total Score - - -   Total Score 0 0 0   Total Score - - -     Encounter-Level CSA - 04/10/2018:    Controlled Substance Agreement - Scan on 4/18/2018  3:13 PM: CONTROLLED SUBSTANCE AGREEMENT (below)       Encounter-Level CSA - 02/13/2017:    Controlled Substance Agreement - Scan on 2/16/2017  2:30 PM: CONTROLLED SUBSTANCE AGREEMENT (below)       Encounter-Level CSA - 01/11/2017:    Controlled Substance Agreement - Scan on 1/16/2017  6:55 PM: CONTROLLED SUBSTANCE AGREEMENT (below)       Encounter-Level CSA - 03/07/2016:    Controlled Substance Agreement - Scan on 3/8/2016  4:25 AM: CONTROLLED SUBSTANCE AGREEMENT 03/07/16 (below)       Encounter-Level CSA - 03/04/2015:    Controlled Substance Agreement - Scan on 3/5/2015  9:09 AM: Controlled Substance Agreement 03/04/15 (below)       Patient-Level CSA:    There are no patient-level csa.           Mental Health Follow up   Patient reports that her mood has been good. Patient reports that she has been keeping busy with life. Patient reports that she has lost some of her belongings when her daughter was cleaning out her house.     Status since last visit: reports increased weight loss and hair loss.     See PHQ-9 for current symptoms.  Other associated symptoms: None    Complicating factors: medication concerns.   Significant life event:  No   Current substance abuse:  None  Anxiety or Panic symptoms:  No    Sleep - waking up at least 2-3 times at night to use the bathroom. Patient reports only having small amounts urine with the frequent bathroom visits.   Appetite - low, has been trying to use boost as recommended. Lost her prescription and will need to have this re-ordered.   Exercise - denies.     Smoking - occasionally.   Alcohol -  denies.   Street drugs - denies.   Marijuana - denies.     PHQ-9  English PHQ-9   Any Language               Problems taking medications regularly: No    Medication side effects: none    Diet: potassium    Social History     Tobacco Use     Smoking status: Former Smoker     Packs/day: 0.25     Years: 40.00     Pack years: 10.00     Types: Cigarettes     Smokeless tobacco: Never Used   Substance Use Topics     Alcohol use: No     Alcohol/week: 0.0 oz        Problem list and histories reviewed & adjusted, as indicated.  Additional history: as documented    Patient Active Problem List   Diagnosis     Hyperlipidemia LDL goal <100     ARAUZ (dyspnea on exertion)     COPD (chronic obstructive pulmonary disease) (H)     Edema     Fatigue     History of MI (myocardial infarction)     Snores     Knee pain, left     Bunion of great toe of left foot     Dystrophic nail     Arthritis     Peripheral vascular disease (H)     Chronic low back pain     Health Care Home     CKD (chronic kidney disease) stage 4, GFR 15-29 ml/min (H)     Abnormal gait     Health maintenance examination     Vitamin D deficiency     Constipation     Hypertension goal BP (blood pressure) < 130/80     Bradycardia     Callus of foot     Other chronic pain     Cataracts, bilateral     Hyperopic astigmatism of both eyes     Presbyopia     Asymptomatic bunion, right     Acquired hypothyroidism     Type 2 diabetes mellitus with diabetic chronic kidney disease (H)     Hypertension associated with diabetes (H)     Hyperlipidemia associated with type 2 diabetes mellitus (H)     Obesity (BMI 30-39.9)     History of sick sinus syndrome     Nephrotic syndrome     Pneumonia     Grief     Tobacco dependence     HCOM with LVOT     Hyperkalemia     Type 2 diabetes, HbA1C goal < 8% (H)     Smoker     Single kidney     Hypothyroid     Hypertension goal BP (blood pressure) < 140/90     Hyperlipidemia     Diabetic nephropathy (H)     Hypoalbuminemia     Skin lesion of hand      ERRONEOUS ENCOUNTER--DISREGARD     JUSTINE (generalized anxiety disorder)     Atypical chest pain     COPD exacerbation (H)     Past Surgical History:   Procedure Laterality Date     APPENDECTOMY       BLEPHAROPLASTY BILATERAL  2013    Procedure: BLEPHAROPLASTY BILATERAL;  BILATERAL UPPER EYELID BLEPHAROPLASTY ;  Surgeon: Olayinka Lyon MD;  Location:  [facility]     CATARACT IOL, RT/LT Bilateral      CHOLECYSTECTOMY       COLONOSCOPY  7/15/2011    polyps repeat in 5 years     CV CORONARY ANGIOGRAM N/A 2019    Procedure: Coronary Angiogram;  Surgeon: Kunal Nj MD;  Location:  [facility]     elected term pregnancy       HYSTEROSCOPIC PLACEMENT CONTRACEPTIVE DEVICE       IR CVC TUNNEL PLACEMENT > 5 YRS OF AGE  2019     KIDNEY SURGERY      donated left kideny     OVARY SURGERY      left for cyst benign     subclavian stent  2010    [facility]       Social History     Tobacco Use     Smoking status: Former Smoker     Packs/day: 0.25     Years: 40.00     Pack years: 10.00     Types: Cigarettes     Smokeless tobacco: Never Used   Substance Use Topics     Alcohol use: No     Alcohol/week: 0.0 oz     Family History   Problem Relation Age of Onset     Diabetes Mother         brother, MGM, sister     Kidney Disease Brother         X2 DM two      Alcohol/Drug Child         daughter     Asthma No family hx of      C.A.D. No family hx of      Hypertension No family hx of      Cerebrovascular Disease No family hx of      Breast Cancer No family hx of      Cancer - colorectal No family hx of      Prostate Cancer No family hx of      Allergies No family hx of      Alzheimer Disease No family hx of      Anesthesia Reaction No family hx of      Arthritis No family hx of      Blood Disease No family hx of      Cancer No family hx of      Cardiovascular No family hx of      Circulatory No family hx of      Congenital Anomalies No family hx of      Connective Tissue Disorder No family  hx of      Depression No family hx of      Eye Disorder No family hx of      Genetic Disorder No family hx of      Gastrointestinal Disease No family hx of      Genitourinary Problems No family hx of      Gynecology No family hx of      Heart Disease No family hx of      Lipids No family hx of      Musculoskeletal Disorder No family hx of      Neurologic Disorder No family hx of      Obesity No family hx of      Osteoporosis No family hx of      Psychotic Disorder No family hx of      Respiratory No family hx of      Thyroid Disease No family hx of      Glaucoma No family hx of      Macular Degeneration No family hx of            Current Outpatient Medications   Medication Sig Dispense Refill     acetaminophen (TYLENOL) 325 MG tablet Take 2 tablets (650 mg) by mouth every 4 hours as needed for mild pain       albuterol (PROAIR HFA/PROVENTIL HFA/VENTOLIN HFA) 108 (90 Base) MCG/ACT inhaler Inhale 2 puffs into the lungs every 6 hours as needed for shortness of breath / dyspnea or wheezing 18 g 3     Alcohol Swabs (SM ALCOHOL PREP) 70 % PADS Externally apply 1 pad topically 4 times daily 100 each 11     ammonium lactate (LAC-HYDRIN) 12 % external lotion Apply topically 2 times daily 225 g 0     ASPIR-LOW 81 MG EC tablet Take 1 tablet (81 mg) by mouth daily 90 tablet 3     atorvastatin (LIPITOR) 40 MG tablet Take 1 tablet (40 mg) by mouth daily 90 tablet 1     blood glucose monitoring (FREESTYLE LITE) test strip TEST BLOOD SUGAR TWO TIMES A DAY 50 strip 12     blood glucose monitoring (FREESTYLE) lancets Test BS two times daily as directed 100 each 1     Blood Pressure Monitoring (BLOOD PRESSURE CUFF) MISC 1 each 2 times daily 1 each 0     Cholecalciferol (VITAMIN D) 2000 units tablet Take 2,000 Units by mouth daily 90 tablet 3     diltiazem ER COATED BEADS (CARDIZEM CD/CARTIA XT) 240 MG 24 hr capsule Take 1 capsule (240 mg) by mouth daily 30 capsule 0     docusate sodium (COLACE) 100 MG capsule Take 1 capsule (100 mg)  by mouth 2 times daily 60 capsule 4     Emollient (EUCERIN CALMING DAILY MOIST) CREA Externally apply 1 dose. topically daily 1 Tube 12     fluticasone (FLONASE) 50 MCG/ACT nasal spray Spray 1 spray into both nostrils daily 9.9 mL 1     Fluticasone-Umeclidin-Vilanterol (TRELEGY ELLIPTA) 100-62.5-25 MCG/INH oral inhaler Inhale 1 puff into the lungs daily 1 Inhaler 0     furosemide (LASIX) 20 MG tablet Take 2 tablets (40 mg) by mouth 2 times daily 180 tablet 0     hydrOXYzine (ATARAX) 10 MG tablet Take 1 tablet (10 mg) by mouth 3 times daily as needed for itching 30 tablet 0     levothyroxine (SYNTHROID/LEVOTHROID) 200 MCG tablet Take 1 tablet (200 mcg) by mouth daily 90 tablet 1     mometasone-formoterol (DULERA) 100-5 MCG/ACT inhaler Inhale 2 puffs into the lungs 2 times daily 1 Inhaler 1     nicotine (COMMIT) 2 MG lozenge Place 1 lozenge (2 mg) inside cheek every hour as needed for smoking cessation 20 lozenge 1     nitroGLYcerin (NITROSTAT) 0.4 MG sublingual tablet Place 1 tablet (0.4 mg) under the tongue every 5 minutes as needed for chest pain 25 tablet 0     nystatin (MYCOSTATIN) 353644 UNIT/GM POWD Apply 1 g topically 3 times daily as needed 30 g 1     order for DME Equipment being ordered: Ensure 6 Can 3     order for DME Equipment being ordered: cane 1 each 0     order for DME Equipment being ordered: Diabetic Shoes 1 each 0     order for DME Equipment being ordered: two pairs moderate knee high support hose 2 Device 1     order for DME Equipment being ordered: Compression socks.  Strength:15-20 mmHg 2 each 0     order for DME One wheeled walker with seat and brakes and basket 1 Device 0     oxyCODONE IR (ROXICODONE) 10 MG tablet Take 0.5-1 tablets (5-10 mg) by mouth every 8 hours as needed for moderate to severe pain 90 tablet 0     polyethylene glycol (MIRALAX/GLYCOLAX) packet Take 17 g by mouth daily as needed for constipation 10 packet 0     Allergies   Allergen Reactions     Contrast Dye Hives and  Itching     Clonidine      She had as IP and thinks it made her itchy     Diatrizoate Other (See Comments)     Diltiazem      Severe bradycardia     Iodine-131      Recent Labs   Lab Test 06/21/19  0610 06/20/19  1400 06/19/19  2209  05/23/19  0445 05/22/19  0335  05/21/19  0159  05/01/19  1748  01/04/19  1426  10/19/18  0939  07/26/18  1116  10/25/17  0639  09/07/17  1135   A1C  --   --   --   --   --   --   --   --   --  5.2  --  5.5  --   --   --  5.8*   < > 6.6*  --  6.4*   LDL  --   --   --   --   --   --   --   --   --   --   --   --   --  46  --   --   --  104*  --  81   HDL  --   --   --   --   --   --   --   --   --   --   --   --   --  77  --   --   --  67  --  75   TRIG  --   --   --   --   --   --   --   --   --   --   --   --   --  188*  --   --   --  212*  --  283*   ALT  --   --  17  --  10 10  --   --    < >  --   --   --   --   --   --   --   --  16  --  18   CR 2.33*  --  3.27*   < > 2.97* 2.24*   < >  --    < >  --    < >  --    < >  --    < >  --    < > 2.77*   < > 2.70*   GFRESTIMATED 20*  --  13*   < > 15* 21*   < >  --    < >  --    < >  --    < >  --    < >  --    < > 17*   < > 17*   GFRESTBLACK 23*  --  15*   < > 17* 24*   < >  --    < >  --    < >  --    < >  --    < >  --    < > 20*   < > 21*   POTASSIUM 4.1 4.1 3.6   < > 3.8 3.8   < >  --    < > 6.4*   < >  --    < >  --    < >  --    < > 4.3   < > 6.2*   TSH  --  0.35*  --   --   --   --   --  15.80*  --   --   --   --   --   --   --  1.12  --   --    < > 71.77*    < > = values in this interval not displayed.      BP Readings from Last 3 Encounters:   06/28/19 140/68   06/21/19 138/55   05/30/19 (S) 179/63    Wt Readings from Last 3 Encounters:   06/28/19 63.5 kg (140 lb)   06/21/19 64 kg (141 lb)   05/30/19 64.8 kg (142 lb 13.7 oz)        Labs reviewed in EPIC  Problem list, Medication list, Allergies, and Medical/Social/Surgical histories reviewed in [facility] and updated as appropriate.     ROS: Constitutional, neuro, ENT, endocrine,  pulmonary, cardiac, gastrointestinal, genitourinary, musculoskeletal, integument and psychiatric systems are negative, except as otherwise noted above in the HPI.     OBJECTIVE:                                                    /58   Pulse 79   Temp 97.9  F (36.6  C)   Resp 18   Wt 58.5 kg (129 lb)   SpO2 93%   BMI 22.14 kg/m    Body mass index is 22.14 kg/m .    GENERAL: healthy, alert and no distress  RESP: lungs clear to auscultation - no rales, rhonchi or wheezes  CV: regular rate and rhythm, normal S1 S2, no S3 or S4, no murmur, click or rub, no peripheral edema and peripheral pulses strong.   ABDOMEN: soft, nontender and bowel sounds normal  MS: no gross musculoskeletal defects noted, no edema  NEURO: generalized weakness of all extremities and oriented x 3, [place] and uses a wheeled walker.   PSYCH: mentation appears normal and confused    Mental Status Assessment:  Appearance:   Appropriate   Eye Contact:   Good   Psychomotor Behavior: Normal   Attitude:   Cooperative   Orientation:   All  Speech   Rate / Production: Normal    Volume:  Normal   Mood:    Anxious  Normal  Affect:    Appropriate   Thought Content:  Clear   Thought Form:  Coherent  Logical   Insight:    Fair   Attention Span and Concentration:  limited  Recent and Remote Memory:  intact  Fund of Knowledge: appropriate  Muscle Strength and Tone: normal     See [facility] notes     Diagnostic Test Results:  none      ASSESSMENT/PLAN:                                                    (N18.4) CKD (chronic kidney disease) stage 4, GFR 15-29 ml/min (H)  (primary encounter diagnosis)  (N18.5) CKD (chronic kidney disease) stage 5, GFR less than 15 ml/min (H)  Comment: Patient is scheduled for dialysis three times per week and reports having to get up 2-3 times at night to use the bathroom. Patient is also struggling with her recent weight loss and hair loss.   Plan: CARE COORDINATION REFERRAL; order for DME for boost.         -Work with patient on  getting to her appointments as scheduled and scheduling some needed follow-up appointments.    -will order nutritional supplement (boost) and follow-up to ensure that patient remains well nourished with her loss of appetite.     (E11.22,  N18.4) Type 2 diabetes mellitus with stage 4 chronic kidney disease, without long-term current use of insulin (H)  Comment: Patient reports that she has been checking her blood sugars a few times per week. Denies any hyper- or hypo- blood sugars.   Lab Results   Component Value Date    A1C 5.2 05/01/2019    A1C 5.5 01/04/2019    A1C 5.8 07/26/2018    A1C 6.0 01/16/2018    A1C 6.6 10/25/2017     Plan: Needs A1C recheck next month.       (I10) Hypertension goal BP (blood pressure) < 140/90  Comment: Patient denies any chest pain, shortness of breath, heart palpitations or syncopal episodes.   BP Readings from Last 3 Encounters:   07/26/19 140/58   06/28/19 140/68   06/21/19 138/55     Plan: continue with anti-hypertensive with no changes.   -Will need to have patient schedule and follow-up with nephrology- no office visit noted since 5/1/2019  -Patient signed a release of information for [employer] and will reach out to them about their goal weight for the patient.     (M54.5,  G89.29) Chronic midline low back pain without sciatica  Comment: Improving.   Plan: patient is on oxycodone 5-10 mg every 8 hrs as needed for pain and this seems to keep her comfortable.       Patient Instructions   -Please bring all the medications including inhalers to the next visit.         JUNIOR Lopez CNP  [facility]

## 2019-07-26 NOTE — PROGRESS NOTES
[facility]   July 26, 2019      Behavioral Health Clinician Progress Note    Patient Name: Kim Anne           Service Type: Individual      Service Location:   Face to Face in Clinic     Session Start Time: 11:am  Session End Time: 11:25am      Session Length: 16 - 37      Attendees: Patient and PCP    Visit Activities (Refresh list every visit): [facility] [employer]    Diagnostic Assessment Date: TBD  Treatment Plan Review Date: 10-  See Flowsheets for today's PHQ-9 and JUSTINE-7 results  Previous PHQ-9:   PHQ-9 SCORE 2/12/2018 3/12/2018 6/25/2018   PHQ-9 Total Score - - -   PHQ-9 Total Score - - -   PHQ-9 Total Score 0 0 0     Previous JUSTINE-7:   JUSTINE-7 SCORE 12/12/2016 3/12/2018 6/25/2018   Total Score - - -   Total Score 0 0 0   Total Score - - -       NAE LEVEL:  NAE Score (Last Two) 2/18/2013 5/23/2014   NAE Raw Score 48 45   Activation Score 80 73.1   NAE Level 4 4       DATA  Extended Session (60+ minutes): No  Interactive Complexity: No  Crisis: No    Treatment Objective(s) Addressed in This Session:  Target Behavior(s): disease management/lifestyle changes manage diabetes better    Adjustment Difficulties: will develop coping/problem-solving skills to facilitate more adaptive adjustment  Psychological distress related to Diabetes    Current Stressors / Issues:  [facility] co-visit with patient and PCP in the clinic. Patient reports her main concern is that she is loosing hair and loosing weight-regarding sleeping the client reported only waking to use the restroom-low appetite forcing self to eat-loosing weight and the PCP will talk with the dialysis company to assess this weight loss-regarding mood she reported feeling good-she goes to [employer] and goes to the [employer]-no suicidal thoughts-she has a lot of significant important people in her life-she spends time with friends and family-she is taking her medications as prescribed and has been setting up her own  medications-regarding anxiety the patient reported having manageable anxiety and no panic attacks-as the PCP talked with the patient about her prescriptions there was some confusion about what she was taking so the patient agreed to bring her medications to her next appointment.      Progress on Treatment Objective(s) / Homework:  Minimal progress - PREPARATION (Decided to change - considering how); Intervened by negotiating a change plan and determining options / strategies for behavior change, identifying triggers, exploring social supports, and working towards setting a date to begin behavior change    Motivational Interviewing    MI Intervention: Expressed Empathy/Understanding, Supported Autonomy, Collaboration, Evocation, Open-ended questions and Reflections: simple and complex     Change Talk Expressed by the Patient: Desire to change Ability to change Reasons to change Need to change Committment to change Activation Taking steps    Provider Response to Change Talk: E - Evoked more info from patient about behavior change, A - Affirmed patient's thoughts, decisions, or attempts at behavior change, R - Reflected patient's change talk and S - Summarized patient's change talk statements      Care Plan review completed: No    Medication Review:  No changes to current psychiatric medication(s)    Medication Compliance:  Yes    Changes in Health Issues:   Yes: please see medical note by PCP    Chemical Use Review:   Substance Use: Chemical use reviewed, no active concerns identified      Tobacco Use: No current tobacco use.       Assessment: Current Emotional / Mental Status (status of significant symptoms):  Risk status (Self / Other harm or suicidal ideation)  Patient denies a history of suicidal ideation, suicide attempts, self-injurious behavior, homicidal ideation, homicidal behavior and and other safety concerns  Patient denies current fears or concerns for personal safety.  Patient denies current or recent  suicidal ideation or behaviors.  Patient denies current or recent homicidal ideation or behaviors.  Patient denies current or recent self injurious behavior or ideation.  Patient denies other safety concerns.  A safety and risk management plan has not been developed at this time, however patient was encouraged to call [facility] should there be a change in any of these risk factors.    Appearance:   Appropriate   Eye Contact:   Good   Psychomotor Behavior: Normal   Attitude:   Cooperative   Orientation:   All  Speech   Rate / Production: Normal    Volume:  Normal   Mood:    Normal  Affect:    Appropriate  Blunted   Thought Content:  Clear   Thought Form:  Coherent  Goal Directed  Logical   Insight:    Fair     Diagnoses:  1. Adjustment disorder with anxious mood        Collateral Reports Completed:  Not Applicable    Plan: (Homework, other):  Patient was given information about behavioral services and was encouraged to schedule a follow up appointment with the clinic [facility] as needed to complete the DA.  She was also given information about mental health symptoms and treatment options .  CD Recommendations: Maintain Sobriety.     Yoan Pichardo [facility], [facility]                                                    Treatment Plan    Client's Name: Kim Anne  YOB: 1945    Date: 7-    Generalized Anxiety Disorder   Grief    Referral / Collaboration:  Referral to another professional/service is not indicated at this time..    Anticipated number of session or this episode of care: 20      MeasurableTreatment Goal(s) related to diagnosis / functional impairment(s)  Goal 1: Client will gain some understanding of her current medical condition and mastery over the care of her medical conditions    I will know I've met my goal when know what is going on with my health.      Objective #A (Client Action)    Client will identify 2 successful strategies to more effectively address stressors.  Status:  New - Date: 7-     Intervention(s)  Therapist will teach active acceptance with mindful coping.      Patient has reviewed and agreed to the above plan.      Yoan Pichardo, [facility]  July 26, 2019

## 2019-07-29 ENCOUNTER — TELEPHONE (OUTPATIENT)
Dept: FAMILY MEDICINE | Facility: CLINIC | Age: 74
End: 2019-07-29

## 2019-07-29 ENCOUNTER — PATIENT OUTREACH (OUTPATIENT)
Dept: CARE COORDINATION | Facility: CLINIC | Age: 74
End: 2019-07-29

## 2019-07-29 NOTE — TELEPHONE ENCOUNTER
"hydralazine is not on active med list, DC'd 6/21/19. As \"stop at discharge\"    Roxanne Leyva RN  "

## 2019-07-29 NOTE — TELEPHONE ENCOUNTER
Pt needs Follow-up with Nephrology.   Called and original provider has left the clinic, pt scheduled with alternate provider. First opening 9/11/19 @ 3pm and 2pm for labs. Scheduled for pt    Called Kim and informed her of scheduled appointments. Pt states that she will make it to the appts.     Elizabet Hester    Care Management Coordinator - [facility]

## 2019-07-29 NOTE — TELEPHONE ENCOUNTER
Fax came through to refill hydralazine 25mg tablets from pharmacy. Unable to locate medication in active meds.     Kallie Pritchard RN  07/29/19  12:05 PM

## 2019-07-29 NOTE — PROGRESS NOTES
Clinic Care Coordination Contact  Care Team Conversations    Received care coordination referral from PCP to discuss advance care planning with patient. Chart reviewed and patient has [facility] care coordination. Will route to [facility] for follow up.     Hafsa Gomez RN  [facility]-Care Coordination  [facility]  [facility]  [phone]

## 2019-07-29 NOTE — TELEPHONE ENCOUNTER
Received in error.  Please route to correct individual.  Please see Care Team Tab.  Thank you.  Jessica

## 2019-07-31 ENCOUNTER — PATIENT OUTREACH (OUTPATIENT)
Dept: GERIATRIC MEDICINE | Facility: CLINIC | Age: 74
End: 2019-07-31

## 2019-07-31 NOTE — LETTER
August 19, 2019      KIM CHAVEZ  [address]  [place] [address]        Dear Kim:     Your Care Coordinator has been unable to reach you by telephone. I am writing to ask you or a family member to call me at [phone]. If you reach my voicemail, please leave a message with your daytime telephone number and a date and time that I can call you. If you are hearing impaired, please call the [place] [employer] at 470 or [phone] (zfugnc-rn-frcktz relay service).     The reason I am trying to reach you is:    [] Six (6) month check-in  [x] To schedule your annual assessment  [] Other:      Please call me as soon as you receive this letter. I look forward to speaking with you.    Sincerely,      Batool Mai RN, PHN    E-mail: [email or web address]  Phone: [phone]      [facility] ([facility]) is a health plan that contracts with both Medicare and the [place] Medical Assistance (Medicaid) program to provide benefits of both programs to enrollees. Enrollment in [facility] depends on contract renewal.    [id number]    [id number] (11/18)

## 2019-07-31 NOTE — PROGRESS NOTES
[facility] Care Coordination Contact    Attempt to reach member to schedule annual home visit and to discuss advanced care planning.  Unable to leave VM requesting call back.  Batool Mai RN, BSN, PHN  [facility]  [phone]  Fax: [phone]

## 2019-07-31 NOTE — LETTER
August 12, 2019      KIM CHAVEZ  [address]  [place] [facility] [address]        Dear Kim:     Your Care Coordinator has been unable to reach you by telephone. I am writing to ask you or a family member to call me at [phone]. If you reach my voicemail, please leave a message with your daytime telephone number and a date and time that I can call you. If you are hearing impaired, please call the [place] [employer] at [phone] or [phone] (zhdrfn-jl-typvml relay service).     The reason I am trying to reach you is:    [] Six (6) month check-in  [x] To schedule your annual assessment  [] Other:      Please call me as soon as you receive this letter. I look forward to speaking with you.    Sincerely,      Batool Mai RN, PHN    E-mail: [email or web address]  Phone: [phone]      [facility] ([facility]) is a health plan that contracts with both Medicare and the [place] Medical Assistance (Medicaid) program to provide benefits of both programs to enrollees. Enrollment in [facility] depends on contract renewal.    [id number]    C1682A (11/18)

## 2019-08-02 NOTE — TELEPHONE ENCOUNTER
Writer notes Rx DC'd at patient's hospitalization on 06/21/2019. Patient is no longer taking medication    Kenia Mendoza, NICOLE  Triage Nurse

## 2019-08-02 NOTE — TELEPHONE ENCOUNTER
hydrALAZINE (APRESOLINE) 25 MG tablet (Discontinued)Last Written Prescription Date:  5/8/19- 6/21/19  Last Fill Quantity: 30,  # refills: 3   Last office visit: 7/26/2019 with prescribing provider:     Future Office Visit:   Next 5 appointments (look out 90 days)    Aug 06, 2019  1:00 PM CDT  Return Visit with JUNIOR Bishop CNP  [facility] ([facility]) [address] [place] [address]  [address] [place] [id number]  [phone]   Aug 06, 2019  1:30 PM CDT  Return Visit with Yoan Pichardo [facility] ([facility]) [address] [place] [address]  [address] [place] [id number]  [phone]   Aug 21, 2019 10:30 AM CDT  SHORT with Carmen Elder [facility] ([facility]) [address] [place] [address]  [address] [place] [id number]  [phone]

## 2019-08-05 ENCOUNTER — ANCILLARY PROCEDURE (OUTPATIENT)
Dept: CARDIOLOGY | Facility: CLINIC | Age: 74
End: 2019-08-05
Attending: NURSE PRACTITIONER
Payer: COMMERCIAL

## 2019-08-05 DIAGNOSIS — I47.10 SVT (SUPRAVENTRICULAR TACHYCARDIA) (H): ICD-10-CM

## 2019-08-06 ENCOUNTER — OFFICE VISIT (OUTPATIENT)
Dept: BEHAVIORAL HEALTH | Facility: CLINIC | Age: 74
End: 2019-08-06
Payer: COMMERCIAL

## 2019-08-06 ENCOUNTER — OFFICE VISIT (OUTPATIENT)
Dept: FAMILY MEDICINE | Facility: CLINIC | Age: 74
End: 2019-08-06
Payer: COMMERCIAL

## 2019-08-06 VITALS
WEIGHT: 127 LBS | RESPIRATION RATE: 16 BRPM | DIASTOLIC BLOOD PRESSURE: 58 MMHG | TEMPERATURE: 98.4 F | OXYGEN SATURATION: 96 % | BODY MASS INDEX: 21.8 KG/M2 | SYSTOLIC BLOOD PRESSURE: 138 MMHG | HEART RATE: 88 BPM

## 2019-08-06 DIAGNOSIS — F41.1 GAD (GENERALIZED ANXIETY DISORDER): Primary | ICD-10-CM

## 2019-08-06 DIAGNOSIS — M54.50 CHRONIC LOW BACK PAIN WITHOUT SCIATICA, UNSPECIFIED BACK PAIN LATERALITY: ICD-10-CM

## 2019-08-06 DIAGNOSIS — Z79.899 MEDICATION MANAGEMENT: Primary | ICD-10-CM

## 2019-08-06 DIAGNOSIS — G89.29 CHRONIC LOW BACK PAIN WITHOUT SCIATICA, UNSPECIFIED BACK PAIN LATERALITY: ICD-10-CM

## 2019-08-06 PROCEDURE — 99213 OFFICE O/P EST LOW 20 MIN: CPT | Performed by: NURSE PRACTITIONER

## 2019-08-06 RX ORDER — OXYCODONE HYDROCHLORIDE 10 MG/1
5-10 TABLET ORAL EVERY 8 HOURS PRN
Qty: 90 TABLET | Refills: 0 | Status: SHIPPED | OUTPATIENT
Start: 2019-08-06 | End: 2019-09-05

## 2019-08-06 NOTE — PROGRESS NOTES
SUBJECTIVE:                                                    Kim Anne is a 73 year old female who presents to clinic today for the following health issues:      Medication Followup of :All Medications.   Patient was supposed to bring all of her medications to this appointment for reconciliation and to follow-up on what she is actually taking because she does not remember all her medications. Patient forgot her medications at home. Pain medication refilled and requested patient to reschedule in 2 weeks for medication reconciliation.       Taking Medication as prescribed: yes    Side Effects:  None    Medication Helping Symptoms:  yes         Social History     Tobacco Use     Smoking status: Former Smoker     Packs/day: 0.25     Years: 40.00     Pack years: 10.00     Types: Cigarettes     Smokeless tobacco: Never Used   Substance Use Topics     Alcohol use: No     Alcohol/week: 0.0 oz        Problem list and histories reviewed& adjusted, as indicated.  Additional history: as documented    Patient Active Problem List   Diagnosis     Hyperlipidemia LDL goal <100     ARAUZ (dyspnea on exertion)     COPD (chronic obstructive pulmonary disease) (H)     Edema     Fatigue     History of MI (myocardial infarction)     Snores     Knee pain, left     Bunion of great toe of left foot     Dystrophic nail     Arthritis     Peripheral vascular disease (H)     Chronic low back pain     Health Care Home     CKD (chronic kidney disease) stage 4, GFR 15-29 ml/min (H)     Abnormal gait     Health maintenance examination     Vitamin D deficiency     Constipation     Hypertension goal BP (blood pressure) < 130/80     Bradycardia     Callus of foot     Other chronic pain     Cataracts, bilateral     Hyperopic astigmatism of both eyes     Presbyopia     Asymptomatic bunion, right     Acquired hypothyroidism     Type 2 diabetes mellitus with diabetic chronic kidney disease (H)     Hypertension associated with diabetes (H)      Hyperlipidemia associated with type 2 diabetes mellitus (H)     Obesity (BMI 30-39.9)     History of sick sinus syndrome     Nephrotic syndrome     Pneumonia     Grief     Tobacco dependence     HCOM with LVOT     Hyperkalemia     Type 2 diabetes, HbA1C goal < 8% (H)     Smoker     Single kidney     Hypothyroid     Hypertension goal BP (blood pressure) < 140/90     Hyperlipidemia     Diabetic nephropathy (H)     Hypoalbuminemia     Skin lesion of hand     ERRONEOUS ENCOUNTER--DISREGARD     JUSTINE (generalized anxiety disorder)     Atypical chest pain     COPD exacerbation (H)     Past Surgical History:   Procedure Laterality Date     APPENDECTOMY       BLEPHAROPLASTY BILATERAL  2013    Procedure: BLEPHAROPLASTY BILATERAL;  BILATERAL UPPER EYELID BLEPHAROPLASTY ;  Surgeon: Olayinka Lyon MD;  Location:  [facility]     CATARACT IOL, RT/LT Bilateral 2016     CHOLECYSTECTOMY       COLONOSCOPY  7/15/2011    polyps repeat in 5 years     CV CORONARY ANGIOGRAM N/A 2019    Procedure: Coronary Angiogram;  Surgeon: Kunal Nj MD;  Location:  [facility]     elected term pregnancy       HYSTEROSCOPIC PLACEMENT CONTRACEPTIVE DEVICE       IR CVC TUNNEL PLACEMENT > 5 YRS OF AGE  2019     KIDNEY SURGERY      donated left kideny     OVARY SURGERY      left for cyst benign     subclavian stent  2010    [facility]       Social History     Tobacco Use     Smoking status: Former Smoker     Packs/day: 0.25     Years: 40.00     Pack years: 10.00     Types: Cigarettes     Smokeless tobacco: Never Used   Substance Use Topics     Alcohol use: No     Alcohol/week: 0.0 oz     Family History   Problem Relation Age of Onset     Diabetes Mother         brother, MGM, sister     Kidney Disease Brother         X2 DM two      Alcohol/Drug Child         daughter     Asthma No family hx of      C.A.D. No family hx of      Hypertension No family hx of      Cerebrovascular Disease No family hx of      Breast  Cancer No family hx of      Cancer - colorectal No family hx of      Prostate Cancer No family hx of      Allergies No family hx of      Alzheimer Disease No family hx of      Anesthesia Reaction No family hx of      Arthritis No family hx of      Blood Disease No family hx of      Cancer No family hx of      Cardiovascular No family hx of      Circulatory No family hx of      Congenital Anomalies No family hx of      Connective Tissue Disorder No family hx of      Depression No family hx of      Eye Disorder No family hx of      Genetic Disorder No family hx of      Gastrointestinal Disease No family hx of      Genitourinary Problems No family hx of      Gynecology No family hx of      Heart Disease No family hx of      Lipids No family hx of      Musculoskeletal Disorder No family hx of      Neurologic Disorder No family hx of      Obesity No family hx of      Osteoporosis No family hx of      Psychotic Disorder No family hx of      Respiratory No family hx of      Thyroid Disease No family hx of      Glaucoma No family hx of      Macular Degeneration No family hx of            Current Outpatient Medications   Medication Sig Dispense Refill     acetaminophen (TYLENOL) 325 MG tablet Take 2 tablets (650 mg) by mouth every 4 hours as needed for mild pain (Patient not taking: Reported on 7/26/2019)       albuterol (PROAIR HFA/PROVENTIL HFA/VENTOLIN HFA) 108 (90 Base) MCG/ACT inhaler Inhale 2 puffs into the lungs every 6 hours as needed for shortness of breath / dyspnea or wheezing 18 g 3     Alcohol Swabs (SM ALCOHOL PREP) 70 % PADS Externally apply 1 pad topically 4 times daily 100 each 11     ammonium lactate (LAC-HYDRIN) 12 % external lotion Apply topically 2 times daily 225 g 0     ASPIR-LOW 81 MG EC tablet Take 1 tablet (81 mg) by mouth daily 90 tablet 3     atorvastatin (LIPITOR) 40 MG tablet Take 1 tablet (40 mg) by mouth daily 90 tablet 1     blood glucose monitoring (FREESTYLE LITE) test strip TEST BLOOD  SUGAR TWO TIMES A DAY 50 strip 12     blood glucose monitoring (FREESTYLE) lancets Test BS two times daily as directed 100 each 1     Blood Pressure Monitoring (BLOOD PRESSURE CUFF) MISC 1 each 2 times daily 1 each 0     Cholecalciferol (VITAMIN D) 2000 units tablet Take 2,000 Units by mouth daily 90 tablet 3     diltiazem ER COATED BEADS (CARDIZEM CD/CARTIA XT) 240 MG 24 hr capsule Take 1 capsule (240 mg) by mouth daily 30 capsule 0     docusate sodium (COLACE) 100 MG capsule Take 1 capsule (100 mg) by mouth 2 times daily 60 capsule 4     Emollient (EUCERIN CALMING DAILY MOIST) CREA Externally apply 1 dose. topically daily 1 Tube 12     fluticasone (FLONASE) 50 MCG/ACT nasal spray Spray 1 spray into both nostrils daily 9.9 mL 1     Fluticasone-Umeclidin-Vilanterol (TRELEGY ELLIPTA) 100-62.5-25 MCG/INH oral inhaler Inhale 1 puff into the lungs daily 1 Inhaler 0     furosemide (LASIX) 20 MG tablet Take 2 tablets (40 mg) by mouth 2 times daily 180 tablet 0     hydrOXYzine (ATARAX) 10 MG tablet Take 1 tablet (10 mg) by mouth 3 times daily as needed for itching 30 tablet 0     levothyroxine (SYNTHROID/LEVOTHROID) 200 MCG tablet Take 1 tablet (200 mcg) by mouth daily 90 tablet 1     mometasone-formoterol (DULERA) 100-5 MCG/ACT inhaler Inhale 2 puffs into the lungs 2 times daily 1 Inhaler 1     nicotine (COMMIT) 2 MG lozenge Place 1 lozenge (2 mg) inside cheek every hour as needed for smoking cessation 20 lozenge 1     nitroGLYcerin (NITROSTAT) 0.4 MG sublingual tablet Place 1 tablet (0.4 mg) under the tongue every 5 minutes as needed for chest pain 25 tablet 0     nystatin (MYCOSTATIN) 812610 UNIT/GM POWD Apply 1 g topically 3 times daily as needed 30 g 1     order for DME Equipment being ordered: Boost. 6 Can 3     order for DME Equipment being ordered: cane 1 each 0     order for DME Equipment being ordered: Diabetic Shoes 1 each 0     order for DME Equipment being ordered: two pairs moderate knee high support hose 2  Device 1     order for DME Equipment being ordered: Compression socks.  Strength:15-20 mmHg 2 each 0     order for DME One wheeled walker with seat and brakes and basket 1 Device 0     oxyCODONE IR (ROXICODONE) 10 MG tablet Take 0.5-1 tablets (5-10 mg) by mouth every 8 hours as needed for moderate to severe pain 90 tablet 0     polyethylene glycol (MIRALAX/GLYCOLAX) packet Take 17 g by mouth daily as needed for constipation 10 packet 0     Allergies   Allergen Reactions     Contrast Dye Hives and Itching     Clonidine      She had as IP and thinks it made her itchy     Diatrizoate Other (See Comments)     Diltiazem      Severe bradycardia     Iodine-131      Recent Labs   Lab Test 06/21/19  0610 06/20/19  1400 06/19/19  2209  05/23/19  0445 05/22/19  0335  05/21/19  0159  05/01/19  1748  01/04/19  1426  10/19/18  0939  07/26/18  1116  10/25/17  0639  09/07/17  1135   A1C  --   --   --   --   --   --   --   --   --  5.2  --  5.5  --   --   --  5.8*   < > 6.6*  --  6.4*   LDL  --   --   --   --   --   --   --   --   --   --   --   --   --  46  --   --   --  104*  --  81   HDL  --   --   --   --   --   --   --   --   --   --   --   --   --  77  --   --   --  67  --  75   TRIG  --   --   --   --   --   --   --   --   --   --   --   --   --  188*  --   --   --  212*  --  283*   ALT  --   --  17  --  10 10  --   --    < >  --   --   --   --   --   --   --   --  16  --  18   CR 2.33*  --  3.27*   < > 2.97* 2.24*   < >  --    < >  --    < >  --    < >  --    < >  --    < > 2.77*   < > 2.70*   GFRESTIMATED 20*  --  13*   < > 15* 21*   < >  --    < >  --    < >  --    < >  --    < >  --    < > 17*   < > 17*   GFRESTBLACK 23*  --  15*   < > 17* 24*   < >  --    < >  --    < >  --    < >  --    < >  --    < > 20*   < > 21*   POTASSIUM 4.1 4.1 3.6   < > 3.8 3.8   < >  --    < > 6.4*   < >  --    < >  --    < >  --    < > 4.3   < > 6.2*   TSH  --  0.35*  --   --   --   --   --  15.80*  --   --   --   --   --   --   --  1.12  --    --    < > 71.77*    < > = values in this interval not displayed.      BP Readings from Last 3 Encounters:   07/26/19 140/58   06/28/19 140/68   06/21/19 138/55    Wt Readings from Last 3 Encounters:   07/26/19 58.5 kg (129 lb)   06/28/19 63.5 kg (140 lb)   06/21/19 64 kg (141 lb)        Labs reviewed in EPIC  Problem list, Medication list, Allergies, and Medical/Social/Surgical histories reviewed in [facility] and updated as appropriate.     ROS: Constitutional, neuro, ENT, endocrine, pulmonary, cardiac, gastrointestinal, genitourinary, musculoskeletal, integument and psychiatric systems are negative, except as otherwise noted above in the HPI.    OBJECTIVE:                                                    /58 (BP Location: Left arm, Patient Position: Sitting, Cuff Size: Adult Regular)   Pulse 88   Temp 98.4  F (36.9  C) (Temporal)   Resp 16   Wt 57.6 kg (127 lb)   SpO2 96%   BMI 21.80 kg/m    Body mass index is 21.8 kg/m .       ASSESSMENT/PLAN:                                                    (Z79.899) Medication management  (primary encounter diagnosis)  Comment: Patient forgot to bring her medications as previously discussed in her appointment for medication management.   Plan: Patient will reschedule for the next week and bring all the medications that she has at home for reconciliation.   -Will have a reminder call about her appointment and bringing in the medications.     (M54.5,  G89.29) Chronic low back pain without sciatica, unspecified back pain laterality  Comment: Stable with current medications.   Plan: oxyCODONE IR (ROXICODONE) 10 MG tablet        Refill done.       JUNIOR Lopez [facility]

## 2019-08-07 ENCOUNTER — TELEPHONE (OUTPATIENT)
Dept: FAMILY MEDICINE | Facility: CLINIC | Age: 74
End: 2019-08-07

## 2019-08-07 NOTE — TELEPHONE ENCOUNTER
Reason for Call:  Form, our goal is to have forms completed with 72 hours, however, some forms may require a visit or additional information.    Type of letter, form or note:  DM Driving Form     Who is the form from?: [place] Dept of Public Safety (if other please explain)    Where did the form come from: Patient or family brought in       What clinic location was the form placed at?: [facility]    Where the form was placed: [facility]  Box/Folder    What number is listed as a contact on the form?: none       Additional comments: Please fill out and and fax. Thanks!     Call taken on 8/7/2019 at 11:40 AM by Corrie Brown

## 2019-08-08 NOTE — PROGRESS NOTES
[facility] Care Coordination Contact    Second attempt to reach member.  No answer, unable to leave VM.  Batool Mai RN, BSN, PHN  [facility]  [phone]  Fax: [phone]

## 2019-08-12 NOTE — PROGRESS NOTES
"[facility] Care Coordination Contact    Per CC, mailed client an \"Unable to Contact\" letter.    Alisa Griffith  Care Management Specialist  [facility]  [phone]    "

## 2019-08-12 NOTE — PROGRESS NOTES
[facility] Care Coordination Contact    Third attempt to reach member.  Unable to leave voicemail.  Will proceed with unable to reach letter.   Batool Mai RN, BSN, PHN  [facility]  [phone]  Fax: [phone]

## 2019-08-16 ENCOUNTER — APPOINTMENT (OUTPATIENT)
Dept: GENERAL RADIOLOGY | Facility: CLINIC | Age: 74
End: 2019-08-16
Attending: INTERNAL MEDICINE
Payer: COMMERCIAL

## 2019-08-16 ENCOUNTER — HOSPITAL ENCOUNTER (EMERGENCY)
Facility: CLINIC | Age: 74
Discharge: HOME | End: 2019-08-16
Attending: INTERNAL MEDICINE | Admitting: INTERNAL MEDICINE
Payer: COMMERCIAL

## 2019-08-16 VITALS
TEMPERATURE: 97.5 F | WEIGHT: 125.6 LBS | DIASTOLIC BLOOD PRESSURE: 53 MMHG | SYSTOLIC BLOOD PRESSURE: 163 MMHG | BODY MASS INDEX: 21.56 KG/M2 | OXYGEN SATURATION: 93 % | HEART RATE: 75 BPM | RESPIRATION RATE: 16 BRPM

## 2019-08-16 DIAGNOSIS — N18.6 ESRD (END STAGE RENAL DISEASE) ON DIALYSIS (H): ICD-10-CM

## 2019-08-16 DIAGNOSIS — J44.1 COPD EXACERBATION (H): ICD-10-CM

## 2019-08-16 DIAGNOSIS — Z99.2 ESRD (END STAGE RENAL DISEASE) ON DIALYSIS (H): ICD-10-CM

## 2019-08-16 PROCEDURE — 25000125 ZZHC RX 250: Performed by: INTERNAL MEDICINE

## 2019-08-16 PROCEDURE — 99284 EMERGENCY DEPT VISIT MOD MDM: CPT | Mod: Z6 | Performed by: INTERNAL MEDICINE

## 2019-08-16 PROCEDURE — 71046 X-RAY EXAM CHEST 2 VIEWS: CPT

## 2019-08-16 PROCEDURE — 94640 AIRWAY INHALATION TREATMENT: CPT | Performed by: INTERNAL MEDICINE

## 2019-08-16 PROCEDURE — 99284 EMERGENCY DEPT VISIT MOD MDM: CPT | Mod: 25 | Performed by: INTERNAL MEDICINE

## 2019-08-16 RX ORDER — IPRATROPIUM BROMIDE AND ALBUTEROL SULFATE 2.5; .5 MG/3ML; MG/3ML
3 SOLUTION RESPIRATORY (INHALATION) ONCE
Status: COMPLETED | OUTPATIENT
Start: 2019-08-16 | End: 2019-08-16

## 2019-08-16 RX ORDER — LEVOFLOXACIN 250 MG/1
250 TABLET, FILM COATED ORAL DAILY
Qty: 7 TABLET | Refills: 0 | Status: ON HOLD | OUTPATIENT
Start: 2019-08-16 | End: 2019-11-22

## 2019-08-16 RX ORDER — PREDNISONE 20 MG/1
40 TABLET ORAL DAILY
Qty: 10 TABLET | Refills: 0 | Status: SHIPPED | OUTPATIENT
Start: 2019-08-16 | End: 2019-08-21

## 2019-08-16 RX ADMIN — IPRATROPIUM BROMIDE AND ALBUTEROL SULFATE 3 ML: .5; 3 SOLUTION RESPIRATORY (INHALATION) at 14:21

## 2019-08-16 ASSESSMENT — ENCOUNTER SYMPTOMS
FEVER: 0
COUGH: 1
ABDOMINAL PAIN: 0
CONFUSION: 0
RHINORRHEA: 1
ARTHRALGIAS: 0
DIFFICULTY URINATING: 0
EYE REDNESS: 0
NECK STIFFNESS: 0
COLOR CHANGE: 0
SHORTNESS OF BREATH: 0
HEADACHES: 0
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
## 2019-08-16 NOTE — ED AVS SNAPSHOT
[facility], [place], Emergency Department  [address]  [facility] [place] [address]  Phone:  [phone]  Fax:  [phone]                                    Kim Anne   MRN: [id number]    Department:  [facility], Emergency Department   Date of Visit:  8/16/2019           After Visit Summary Signature Page    I have received my discharge instructions, and my questions have been answered. I have discussed any challenges I see with this plan with the nurse or doctor.    ..........................................................................................................................................  Patient/Patient Representative Signature      ..........................................................................................................................................  Patient Representative Print Name and Relationship to Patient    ..................................................               ................................................  Date                                   Time    ..........................................................................................................................................  Reviewed by Signature/Title    ...................................................              ..............................................  Date                                               Time          22EPIC Rev 08/18

## 2019-08-18 NOTE — TELEPHONE ENCOUNTER
RECORDS RECEIVED FROM: deana Mcnela at [facility] care- was seen by Dr Ortiz previously for CKD   DATE RECEIVED: 09.11.2019   NOTES STATUS DETAILS   OFFICE NOTE from referring provider Internal 07.29.2019   OFFICE NOTE from other specialist  Internal 05.01.2019  04.03.2019  11.16.2018  08.16.2018  05.16.2018  04.11.2018  11.16.2018  10.25.2017   *Only VASCULITIS or LUPUS gather office notes for the following N/A    *PULMONARY   N/A    *ENT N/A    *DERMATOLOGY N/A    *RHEUMATOLOGY N/A    DISCHARGE SUMMARY from hospital N/A    DISCHARGE REPORT from the ER N/A    MEDICATION LIST Internal    IMAGING  (NEED IMAGES AND REPORTS)     KIDNEY CT SCAN N/A    KIDNEY ULTRASOUND N/A    LABS     CBC Internal 07.05.2019   CMP Internal 07.05.2019   BMP Internal 05.30.2019   UA N/A    URINE PROTEIN Internal 05.01.2019   RENAL PANEL Internal 06.21.2019   BIOPSY     KIDNEY BIOPSY  N/A

## 2019-08-19 ENCOUNTER — PATIENT OUTREACH (OUTPATIENT)
Dept: GERIATRIC MEDICINE | Facility: CLINIC | Age: 74
End: 2019-08-19

## 2019-08-19 NOTE — PROGRESS NOTES
"[facility] Care Coordination Contact    Per CC, mailed client an \"Unable to Contact\" letter.    Alisa Griffith  Care Management Specialist  [facility]  [phone]    "

## 2019-08-19 NOTE — PROGRESS NOTES
[facility] Care Coordination Contact  CC received notification of Emergency Room visit.  ER visit occurred on 8/16/19 at [facility] with Dx of COPD exaserbation.    CC contacted member and was unable to reach.  No answer, unable to leave voicemail.   Member has a follow-up appointment with PCP: Yes: scheduled on 8/21/19    Care plan reviewed and updated.  PCP notified of ED visit via EMR.

## 2019-08-20 ENCOUNTER — TELEPHONE (OUTPATIENT)
Dept: RESPIRATORY THERAPY | Facility: CLINIC | Age: 74
End: 2019-08-20

## 2019-08-20 NOTE — TELEPHONE ENCOUNTER
Attempted to contact patient today for ongoing COPD education. There was no answer and no capability to leave a message. Will attempt again next month.    JAME Xiong, RRT  Chronic Pulmonary Disease Specialist  Office: [phone]   Pager: [phone]

## 2019-08-21 ENCOUNTER — OFFICE VISIT (OUTPATIENT)
Dept: BEHAVIORAL HEALTH | Facility: CLINIC | Age: 74
End: 2019-08-21
Payer: COMMERCIAL

## 2019-08-21 ENCOUNTER — OFFICE VISIT (OUTPATIENT)
Dept: PHARMACY | Facility: CLINIC | Age: 74
End: 2019-08-21
Payer: COMMERCIAL

## 2019-08-21 ENCOUNTER — OFFICE VISIT (OUTPATIENT)
Dept: FAMILY MEDICINE | Facility: CLINIC | Age: 74
End: 2019-08-21
Payer: COMMERCIAL

## 2019-08-21 VITALS
RESPIRATION RATE: 24 BRPM | SYSTOLIC BLOOD PRESSURE: 138 MMHG | TEMPERATURE: 97.3 F | WEIGHT: 126.5 LBS | OXYGEN SATURATION: 95 % | HEART RATE: 65 BPM | BODY MASS INDEX: 21.71 KG/M2 | DIASTOLIC BLOOD PRESSURE: 62 MMHG

## 2019-08-21 DIAGNOSIS — I47.10 SVT (SUPRAVENTRICULAR TACHYCARDIA) (H): ICD-10-CM

## 2019-08-21 DIAGNOSIS — K59.00 CONSTIPATION, UNSPECIFIED CONSTIPATION TYPE: ICD-10-CM

## 2019-08-21 DIAGNOSIS — F17.200 SMOKER: ICD-10-CM

## 2019-08-21 DIAGNOSIS — F17.210 CIGARETTE NICOTINE DEPENDENCE WITHOUT COMPLICATION: ICD-10-CM

## 2019-08-21 DIAGNOSIS — N18.4 CKD (CHRONIC KIDNEY DISEASE) STAGE 4, GFR 15-29 ML/MIN (H): ICD-10-CM

## 2019-08-21 DIAGNOSIS — E03.9 HYPOTHYROIDISM, UNSPECIFIED TYPE: ICD-10-CM

## 2019-08-21 DIAGNOSIS — J44.9 CHRONIC OBSTRUCTIVE PULMONARY DISEASE, UNSPECIFIED COPD TYPE (H): ICD-10-CM

## 2019-08-21 DIAGNOSIS — N18.4 TYPE 2 DIABETES MELLITUS WITH STAGE 4 CHRONIC KIDNEY DISEASE, WITHOUT LONG-TERM CURRENT USE OF INSULIN (H): ICD-10-CM

## 2019-08-21 DIAGNOSIS — I15.2 HYPERTENSION ASSOCIATED WITH DIABETES (H): ICD-10-CM

## 2019-08-21 DIAGNOSIS — N18.5 CKD (CHRONIC KIDNEY DISEASE) STAGE 5, GFR LESS THAN 15 ML/MIN (H): Primary | ICD-10-CM

## 2019-08-21 DIAGNOSIS — J44.1 COPD EXACERBATION (H): ICD-10-CM

## 2019-08-21 DIAGNOSIS — E11.59 HYPERTENSION ASSOCIATED WITH DIABETES (H): ICD-10-CM

## 2019-08-21 DIAGNOSIS — E11.22 TYPE 2 DIABETES MELLITUS WITH STAGE 4 CHRONIC KIDNEY DISEASE, WITHOUT LONG-TERM CURRENT USE OF INSULIN (H): ICD-10-CM

## 2019-08-21 DIAGNOSIS — E55.9 VITAMIN D DEFICIENCY DISEASE: ICD-10-CM

## 2019-08-21 DIAGNOSIS — J44.1 COPD EXACERBATION (H): Primary | ICD-10-CM

## 2019-08-21 DIAGNOSIS — F43.21 ADJUSTMENT DISORDER WITH DEPRESSED MOOD: ICD-10-CM

## 2019-08-21 DIAGNOSIS — I25.2 HISTORY OF MI (MYOCARDIAL INFARCTION): ICD-10-CM

## 2019-08-21 PROCEDURE — 99214 OFFICE O/P EST MOD 30 MIN: CPT | Performed by: NURSE PRACTITIONER

## 2019-08-21 PROCEDURE — 99606 MTMS BY PHARM EST 15 MIN: CPT | Performed by: PHARMACIST

## 2019-08-21 PROCEDURE — 99607 MTMS BY PHARM ADDL 15 MIN: CPT | Performed by: PHARMACIST

## 2019-08-21 PROCEDURE — 90791 PSYCH DIAGNOSTIC EVALUATION: CPT

## 2019-08-21 RX ORDER — FUROSEMIDE 20 MG
20 TABLET ORAL DAILY
Qty: 180 TABLET | Refills: 0 | COMMUNITY
Start: 2019-08-21 | End: 2019-08-22

## 2019-08-21 RX ORDER — POLYETHYLENE GLYCOL 3350 17 G
2 POWDER IN PACKET (EA) ORAL
Qty: 20 LOZENGE | Refills: 1 | Status: SHIPPED | OUTPATIENT
Start: 2019-08-21 | End: 2019-09-04

## 2019-08-21 NOTE — PATIENT INSTRUCTIONS
Please go to one of the following stores to get your eye glasses    We have ordered you a neb machine and solution, please use 4x daily    We will get you a number for [employer] so you can go there to get your   Boost     We have reordered you your nicotine log    Please stop your Norvasc and Metoprolol,     Restart diltiazem XR 240mg daily     Increase furosemide to 40mg daily    Stop Dulera. Start Advair discus 250mcg BID    Start albuterol neb QID prn    [facility]   Website   Directions   Save   2.85 Google reviews   Eye care center in [place], [place]     Located in: [facility]   Address: [address] [place] [id number]   Hours:   Open ? Closes 8PM     Phone: [phone]   Appointments: [email or web address]

## 2019-08-21 NOTE — PROGRESS NOTES
SUBJECTIVE:                                                    Kim Anne is a 73 year old female who presents to clinic today for the following health issues:  [place] [facility]: Sofía Rico    ED/UC Followup:    Facility:  [facility]  Date of visit: 8/16/19  Reason for visit: COPD exacerbation and infection   Current Status: not back to baseline, finished prednisone and Levaquin, finished her Levaquin early, taking 2 tabs daily instead of one tab daily, not nebing no machine or solution      ED Course   2:00 PM  The patient was seen and examined by Krystal Rollins MD in Room ED02.      Procedures            Results for orders placed or performed during the hospital encounter of 08/16/19 (from the past 24 hour(s))   XR Chest 2 Views     Status: None     Collection Time: 08/16/19  2:50 PM     Narrative     CHEST TWO VIEW   8/16/2019 2:50 PM      HISTORY: Cough.     COMPARISON: Chest x-ray 6/19/2019.        Impression     IMPRESSION: PA and lateral views of the chest. Lungs are clear. Heart  is normal in size. Tiny residual left pleural effusion is noted. This  has improved since prior exam. No pneumothorax. Right IJ vas catheter  terminates near the SVC right atrium junction unchanged in position  since prior study.     SHASHANK HORTON MD         Labs Ordered and Resulted from Time of ED Arrival Up to the Time of Departure from the ED - No data to display        Assessments & Plan (with Medical Decision Making)  COPD exacerbation, CXR no acute change, better effusion, improving with duo neb, will start pred and levaquin, continue neb at home, follow up with her PMD in one week.  ESRD on HD due tomorrow.        COPD Follow-Up    Overall, how are your COPD symptoms since your last clinic visit?  Not back to baseline yet from hospital ED stay     How much fatigue or shortness of breath do you have when you are walking? Worse, not nebing just using MDI, today met with Sofía and she is not using her MDI appropriately, will  look at different inhalers, easier to use, states she does not have neb solution or machine        How much shortness of breath do you have when you are resting?  worse    How often do you cough? Sometimes, almost daily since ED visit     Have you noticed any change in your sputum/phlegm?  No    Have you experienced a recent fever? No    Please describe how far you can walk without stopping to rest:  Less than a mile    How many flights of stairs are you able to walk up without stopping?  1  Have you had any Emergency Room Visits, Urgent Care Visits, or Hospital Admissions because of your COPD since your last office visit?  Yes      How many times have you gone to the ED, Urgent Care, or been hospitalized for COPD since your last clinic visit?  1       History   Smoking Status     Former Smoker     Packs/day: 0.25     Years: 40.00     Types: Cigarettes   Smokeless Tobacco     Never Used     No results found for: FEV1, [id number]      How many servings of fruits and vegetables do you eat daily?  0-1    On average, how many sweetened beverages do you drink each day (soda, juice, sweet tea, etc)?   0    How many days per week do you miss taking your medication? 0      Mental Health Follow up Depression/anxiety    Status since last visit: stable    See PHQ-9 for current symptoms.  Other associated symptoms: None    Complicating factors: COPD, CKD on dialysis  Significant life event:  No   Current substance abuse:  None  Anxiety or Panic symptoms:  No    Sleep - poor  Appetite - ok hard with dialysis  Exercise - none    Smoking - yes, 3 cig a day   Alcohol - denies  Street drugs - denies  Marijuana - denies   Caffeine - yes     PHQ-9  English PHQ-9   Any Language           DM: on no meds and not checking her sugars, no diet modifications, asymptomatic,last HgbA1c 5/2019, 5.2     PHARMD recommendations from  visit today, see below:     1. Will call [facility] pharmacy and dialysis for med rec prior to making medication  changes  2. Stop taking metoprolol and amlodipine per med list, removed from her pill box at her visit  3. Restart diltiazem XR 240mg daily  4. Increase furosemide to 40mg daily  5. Stop Dulera. Start Advair discus 250mcg BID  6. Start albuterol neb QID prn      Problems taking medications regularly: No    Medication side effects: none    Diet: potassium    Social History     Tobacco Use     Smoking status: Former Smoker     Packs/day: 0.25     Years: 40.00     Pack years: 10.00     Types: Cigarettes     Smokeless tobacco: Never Used   Substance Use Topics     Alcohol use: No     Alcohol/week: 0.0 oz        Problem list and histories reviewed & adjusted, as indicated.  Additional history: as documented    Patient Active Problem List   Diagnosis     Hyperlipidemia LDL goal <100     ARAUZ (dyspnea on exertion)     COPD (chronic obstructive pulmonary disease) (H)     Edema     Fatigue     History of MI (myocardial infarction)     Snores     Knee pain, left     Bunion of great toe of left foot     Dystrophic nail     Arthritis     Peripheral vascular disease (H)     Chronic low back pain     Health Care Home     CKD (chronic kidney disease) stage 4, GFR 15-29 ml/min (H)     Abnormal gait     Health maintenance examination     Vitamin D deficiency     Constipation     Hypertension goal BP (blood pressure) < 130/80     Bradycardia     Callus of foot     Other chronic pain     Cataracts, bilateral     Hyperopic astigmatism of both eyes     Presbyopia     Asymptomatic bunion, right     Acquired hypothyroidism     Type 2 diabetes mellitus with diabetic chronic kidney disease (H)     Hypertension associated with diabetes (H)     Hyperlipidemia associated with type 2 diabetes mellitus (H)     Obesity (BMI 30-39.9)     History of sick sinus syndrome     Nephrotic syndrome     Pneumonia     Grief     Tobacco dependence     HCOM with LVOT     Hyperkalemia     Type 2 diabetes, HbA1C goal < 8% (H)     Smoker     Single kidney      Hypothyroid     Hypertension goal BP (blood pressure) < 140/90     Hyperlipidemia     Diabetic nephropathy (H)     Hypoalbuminemia     Skin lesion of hand     ERRONEOUS ENCOUNTER--DISREGARD     JUSTINE (generalized anxiety disorder)     Atypical chest pain     COPD exacerbation (H)     Past Surgical History:   Procedure Laterality Date     APPENDECTOMY       BLEPHAROPLASTY BILATERAL  2013    Procedure: BLEPHAROPLASTY BILATERAL;  BILATERAL UPPER EYELID BLEPHAROPLASTY ;  Surgeon: Olayinka Lyon MD;  Location:  [facility]     CATARACT IOL, RT/LT Bilateral      CHOLECYSTECTOMY       COLONOSCOPY  7/15/2011    polyps repeat in 5 years     CV CORONARY ANGIOGRAM N/A 2019    Procedure: Coronary Angiogram;  Surgeon: Kunal Nj MD;  Location:  [facility]     elected term pregnancy       HYSTEROSCOPIC PLACEMENT CONTRACEPTIVE DEVICE       IR CVC TUNNEL PLACEMENT > 5 YRS OF AGE  2019     KIDNEY SURGERY      donated left kideny     OVARY SURGERY      left for cyst benign     subclavian stent  2010    [facility]       Social History     Tobacco Use     Smoking status: Former Smoker     Packs/day: 0.25     Years: 40.00     Pack years: 10.00     Types: Cigarettes     Smokeless tobacco: Never Used   Substance Use Topics     Alcohol use: No     Alcohol/week: 0.0 oz     Family History   Problem Relation Age of Onset     Diabetes Mother         brother, MGM, sister     Kidney Disease Brother         X2 DM two      Alcohol/Drug Child         daughter     Asthma No family hx of      C.A.D. No family hx of      Hypertension No family hx of      Cerebrovascular Disease No family hx of      Breast Cancer No family hx of      Cancer - colorectal No family hx of      Prostate Cancer No family hx of      Allergies No family hx of      Alzheimer Disease No family hx of      Anesthesia Reaction No family hx of      Arthritis No family hx of      Blood Disease No family hx of      Cancer No family hx  of      Cardiovascular No family hx of      Circulatory No family hx of      Congenital Anomalies No family hx of      Connective Tissue Disorder No family hx of      Depression No family hx of      Eye Disorder No family hx of      Genetic Disorder No family hx of      Gastrointestinal Disease No family hx of      Genitourinary Problems No family hx of      Gynecology No family hx of      Heart Disease No family hx of      Lipids No family hx of      Musculoskeletal Disorder No family hx of      Neurologic Disorder No family hx of      Obesity No family hx of      Osteoporosis No family hx of      Psychotic Disorder No family hx of      Respiratory No family hx of      Thyroid Disease No family hx of      Glaucoma No family hx of      Macular Degeneration No family hx of            Current Outpatient Medications   Medication Sig Dispense Refill     acetaminophen (TYLENOL) 325 MG tablet Take 2 tablets (650 mg) by mouth every 4 hours as needed for mild pain (Patient not taking: Reported on 7/26/2019)       albuterol (PROAIR HFA/PROVENTIL HFA/VENTOLIN HFA) 108 (90 Base) MCG/ACT inhaler Inhale 2 puffs into the lungs every 6 hours as needed for shortness of breath / dyspnea or wheezing 18 g 3     Alcohol Swabs (SM ALCOHOL PREP) 70 % PADS Externally apply 1 pad topically 4 times daily 100 each 11     ammonium lactate (LAC-HYDRIN) 12 % external lotion Apply topically 2 times daily 225 g 0     ASPIR-LOW 81 MG EC tablet Take 1 tablet (81 mg) by mouth daily 90 tablet 3     atorvastatin (LIPITOR) 40 MG tablet Take 1 tablet (40 mg) by mouth daily 90 tablet 1     blood glucose monitoring (FREESTYLE LITE) test strip TEST BLOOD SUGAR TWO TIMES A DAY 50 strip 12     blood glucose monitoring (FREESTYLE) lancets Test BS two times daily as directed 100 each 1     Blood Pressure Monitoring (BLOOD PRESSURE CUFF) MISC 1 each 2 times daily 1 each 0     Cholecalciferol (VITAMIN D) 2000 units tablet Take 2,000 Units by mouth daily 90  tablet 3     diltiazem ER COATED BEADS (CARDIZEM CD/CARTIA XT) 240 MG 24 hr capsule Take 1 capsule (240 mg) by mouth daily 30 capsule 0     docusate sodium (COLACE) 100 MG capsule Take 1 capsule (100 mg) by mouth 2 times daily 60 capsule 4     Emollient (EUCERIN CALMING DAILY MOIST) CREA Externally apply 1 dose. topically daily 1 Tube 12     fluticasone (FLONASE) 50 MCG/ACT nasal spray Spray 1 spray into both nostrils daily 9.9 mL 1     Fluticasone-Umeclidin-Vilanterol (TRELEGY ELLIPTA) 100-62.5-25 MCG/INH oral inhaler Inhale 1 puff into the lungs daily 1 Inhaler 0     furosemide (LASIX) 20 MG tablet Take 2 tablets (40 mg) by mouth 2 times daily 180 tablet 0     hydrOXYzine (ATARAX) 10 MG tablet Take 1 tablet (10 mg) by mouth 3 times daily as needed for itching 30 tablet 0     levofloxacin (LEVAQUIN) 250 MG tablet Take 1 tablet (250 mg) by mouth daily for 7 days 7 tablet 0     levothyroxine (SYNTHROID/LEVOTHROID) 200 MCG tablet Take 1 tablet (200 mcg) by mouth daily 90 tablet 1     mometasone-formoterol (DULERA) 100-5 MCG/ACT inhaler Inhale 2 puffs into the lungs 2 times daily 1 Inhaler 1     nicotine (COMMIT) 2 MG lozenge Place 1 lozenge (2 mg) inside cheek every hour as needed for smoking cessation 20 lozenge 1     nitroGLYcerin (NITROSTAT) 0.4 MG sublingual tablet Place 1 tablet (0.4 mg) under the tongue every 5 minutes as needed for chest pain 25 tablet 0     nystatin (MYCOSTATIN) 411996 UNIT/GM POWD Apply 1 g topically 3 times daily as needed 30 g 1     order for DME Equipment being ordered: Boost. 6 Can 3     order for DME Equipment being ordered: cane 1 each 0     order for DME Equipment being ordered: Diabetic Shoes 1 each 0     order for DME Equipment being ordered: two pairs moderate knee high support hose 2 Device 1     order for DME Equipment being ordered: Compression socks.  Strength:15-20 mmHg 2 each 0     order for DME One wheeled walker with seat and brakes and basket 1 Device 0     oxyCODONE IR  (ROXICODONE) 10 MG tablet Take 0.5-1 tablets (5-10 mg) by mouth every 8 hours as needed for moderate to severe pain 90 tablet 0     polyethylene glycol (MIRALAX/GLYCOLAX) packet Take 17 g by mouth daily as needed for constipation 10 packet 0     predniSONE (DELTASONE) 20 MG tablet Take 2 tablets (40 mg) by mouth daily for 5 days 10 tablet 0     Allergies   Allergen Reactions     Contrast Dye Hives and Itching     Clonidine      She had as IP and thinks it made her itchy     Diatrizoate Other (See Comments)     Diltiazem      Severe bradycardia     Iodine-131      Recent Labs   Lab Test 06/21/19  0610 06/20/19  1400 06/19/19  2209  05/23/19  0445 05/22/19  0335  05/21/19  0159  05/01/19  1748  01/04/19  1426  10/19/18  0939  07/26/18  1116  10/25/17  0639  09/07/17  1135   A1C  --   --   --   --   --   --   --   --   --  5.2  --  5.5  --   --   --  5.8*   < > 6.6*  --  6.4*   LDL  --   --   --   --   --   --   --   --   --   --   --   --   --  46  --   --   --  104*  --  81   HDL  --   --   --   --   --   --   --   --   --   --   --   --   --  77  --   --   --  67  --  75   TRIG  --   --   --   --   --   --   --   --   --   --   --   --   --  188*  --   --   --  212*  --  283*   ALT  --   --  17  --  10 10  --   --    < >  --   --   --   --   --   --   --   --  16  --  18   CR 2.33*  --  3.27*   < > 2.97* 2.24*   < >  --    < >  --    < >  --    < >  --    < >  --    < > 2.77*   < > 2.70*   GFRESTIMATED 20*  --  13*   < > 15* 21*   < >  --    < >  --    < >  --    < >  --    < >  --    < > 17*   < > 17*   GFRESTBLACK 23*  --  15*   < > 17* 24*   < >  --    < >  --    < >  --    < >  --    < >  --    < > 20*   < > 21*   POTASSIUM 4.1 4.1 3.6   < > 3.8 3.8   < >  --    < > 6.4*   < >  --    < >  --    < >  --    < > 4.3   < > 6.2*   TSH  --  0.35*  --   --   --   --   --  15.80*  --   --   --   --   --   --   --  1.12  --   --    < > 71.77*    < > = values in this interval not displayed.      BP Readings from Last 3  Encounters:   08/16/19 (!) 163/53   08/06/19 138/58   07/26/19 140/58    Wt Readings from Last 3 Encounters:   08/16/19 57 kg (125 lb 9.6 oz)   08/06/19 57.6 kg (127 lb)   07/26/19 58.5 kg (129 lb)        Labs reviewed in EPIC  Problem list, Medication list, Allergies, and Medical/Social/Surgical histories reviewed in [facility] and updated as appropriate.     ROS: Constitutional, neuro, ENT, endocrine, pulmonary, cardiac, gastrointestinal, genitourinary, musculoskeletal, integument and psychiatric systems are negative, except as otherwise noted above in the HPI.    OBJECTIVE:                                                    /62   Pulse 65   Temp 97.3  F (36.3  C)   Resp 24   Wt 57.4 kg (126 lb 8 oz)   SpO2 95%   BMI 21.71 kg/m    There is no height or weight on file to calculate BMI.    GENERAL: alert, mild distress, pale, frail, elderly and fatigued  RESP: no rales , no wheezes and rhonchi R upper anterior, R upper posterior, L upper anterior and L upper posterior  CV: regular rate and rhythm, normal S1 S2, no S3 or S4, no murmur, click or rub, no peripheral edema and peripheral pulses strong  ABDOMEN: soft, nontender, no hepatosplenomegaly, no masses and bowel sounds normal  MS: no gross musculoskeletal defects noted, no edema  SKIN: no suspicious lesions or rashes    Mental Status Assessment:  Appearance:   Appropriate    Eye Contact:   Poor  Psychomotor Behavior: Agitated   Attitude:   Cooperative  Guarded   Orientation:   All  Speech   Rate / Production: Slow    Volume:  Soft   Mood:    Anxious  Irritable   Affect:    Flat  Labile   Thought Content:  Clear  Rumination   Thought Form:  Goal Directed   Insight:    Poor   Attention Span and Concentration:  limited  Recent and Remote Memory:  intact  Fund of Knowledge: appropriate  Muscle Strength and Tone: normal   Suicidal Ideation: reports no thoughts, no intention  Hallucination: No  Paranoid-No  Manic-No  Panic-No  Self harm-No      See [facility] notes  Trisha     Diagnostic Test Results:  Results for orders placed or performed during the hospital encounter of 08/16/19   XR Chest 2 Views    Narrative    CHEST TWO VIEW   8/16/2019 2:50 PM     HISTORY: Cough.    COMPARISON: Chest x-ray 6/19/2019.      Impression    IMPRESSION: PA and lateral views of the chest. Lungs are clear. Heart  is normal in size. Tiny residual left pleural effusion is noted. This  has improved since prior exam. No pneumothorax. Right IJ vas catheter  terminates near the SVC right atrium junction unchanged in position  since prior study.    SHASHANK HORTON MD     *Note: Due to a large number of results and/or encounters for the requested time period, some results have not been displayed. A complete set of results can be found in Results Review.        ASSESSMENT/PLAN:                                                    ASSESSMENT / PLAN:  (N18.5) CKD (chronic kidney disease) stage 5, GFR less than 15 ml/min (H)  (primary encounter diagnosis)  Comment: on dialysis, tolerating ok, increase fatigue since ED visit  Plan: meds reviewed with PharmD, unclear if taking appropriately     (E11.22,  N18.4) Type 2 diabetes mellitus with stage 4 chronic kidney disease, without long-term current use of insulin (H)  Comment: no meds not checking sugars, no symptoms, last HgbA1c 5.2  Plan: monitor for symptoms     (J44.9) Chronic obstructive pulmonary disease, unspecified COPD type (H)  Comment: not back to baseline after ED visit, finished AB and prednisone, not using nebs, poor MDI user  Plan: order for DME        Ordered a nebulizer and neb solution , start ASAP 4x daily, MDI changed to Advair for better compliance     (F17.200) Smoker  Comment: chronic but improved  Plan: nicotine (COMMIT) 2 MG lozenge        Restart nicotine log     (J44.1) COPD exacerbation (H)  Comment: not back to baseline   Plan: neb machine and solution ordered, call clinic if sx worsen, may need to restart AB and prednisone       Patient  Instructions   Please go to one of the following stores to get your eye glasses    We have ordered you a neb machine and solution     We will get you a number for [facility] so you can go there to get your   Boost     We have reordered you your nicotine log    [facility]   Website   Directions   Save   2.85 Google reviews   Eye care center in [place], [place]     Located in: [facility]   Address: [address] [place] [id number]   Hours:   Open ? Closes 8PM     Phone: [phone]   Appointments: [email or web address]        Roxanne Maldonado NP, APRN CNP  [facility] PR[facility]

## 2019-08-21 NOTE — Clinical Note
KLEBER Robertson. [place], she does not have her phone turned on right now and couldn't get me another phone number to reach her or time when phone would be back on, but would maybe give her a week or two to outreach again. Dr Liz - not sure if you are the one who follows her at dialysis?  Just wanted to make you aware of how she takes her medication and I faxed an updated med list to dialysis.

## 2019-08-21 NOTE — PROGRESS NOTES
SUBJECTIVE/OBJECTIVE:                Kim Anne is a 73 year old female coming in for a follow-up visit for Medication Therapy Management.  She was referred to me from Ailyn Nieves. Seen as a covisit with Annamarie Maldonado.      Chief Complaint: Follow up from our visit on 4/3/19.  Asking why she is losing her hair. Brought in all her pill boxes today for review.  NOTE: no working phone at this time, unsure when she will be able to pay to have it restart.    Tobacco: 0-1 pack per day - is interested in quittingTobacco Cessation Action Plan: Pharmacotherapies : other Nicotine replacement  Alcohol: not currently using    Medication Adherence/Access:  Patient uses pill box(es). She is setting them up herself currently. Unclear why her granddaughter is no longer helping. Pt did not clearly answer why.   Patient takes medications 1 time(s) per day. She has medication in some of the PM slots in her pill box but says she doesn't take them.   Pt unable to report how often she usually misses her medication. This past week, missed 1 dose.  Brought in her pill box for review today.  Taking as follows(AM only):  Atorvastatin 40mg, aspirin 81mg, metoprolol XL 25mg, levothyroxine 200mcg, amlodipine 10mg, furosemide 20mg, docusate 200mg on some days.   She does not have her pill box set up correctly. Missing doses of metoprolol and amlodipine on some days, states it is because she needs a refill.     COPD: Current medications: Short-Acting Bronchodilator: Albuterol MDI (brought in Proventil, inh  2019 and about 110 puffs remaining. She does not have a nebulizer at home.) ICS/LABA- Dulera 1 puff(s) twice daily. Reviewed inhaler technique, poor. Pt has difficulty depressing the inhaler and timing the medication administration with her breath. Hard for her to use MDI type inhaler.  Pt is not experiencing side effects.   Pt reports the following symptoms: recent respiratory infection and recent ER visit.  Productive cough, states she has a hard time coughing up mucus.  Sometimes mucus is yellow and sometimes clear. She reports being out of prednisone and Levaquin a couple days ago, but should still be taking both today and 2 days remaining on Rx for Levaquin. Unable to state how she had been taking it, thinks she was taking 2 tablets per day. She is unable to read the directions on the bottles because she doesn't currently own reading glasses that work well for her.      ESRD: HD at [facility] (Fax [phone]), [facility], states she has never missed a day. History of solitary kidney s/p donation.  Pt thinks her blood pressure has been low at dialysis.  Called to verify and readings are as follows this past week:  Pre 208/99, 209/90, 159/68  Post 141/68, 179/81, 148/60  Spoke with Katherine RIVERA for med rec. Also med rec with [facility] pharmacy and pt recently prescribed Nephrocap MVI.   She was seen briefly by Dr Liz on 8/22 while at dialysis and no medication changes were recommended per RN.    Diabetes:  Pt currently taking no medication. She is not checking her blood sugar. One reading on her glucometer recently in 400s, pt states belonged to a friend.   Patient is not experiencing hypoglycemia  Recent symptoms of high blood sugar? none  Eye exam: up to date  Foot exam: up to date  ACEi/ARB: No.   Urine Albumin:   Lab Results   Component Value Date    UMALCR 10,383.60 (H) 07/26/2018      Aspirin: Taking 81mg daily and denies side effects  Diet/Exercise: did not review today. Pt states appetite has been low and has been losing weight.     Hypertension: Current therapy includes: amlodipine 10mg daily (not on her med list), metoprolol XL 25mg daily (not on her med list), furosemide 20mg daily (med list dose 40mg BID, per dialysis med list 40mg daily).  Not taking diltiazem.   Patient reports no current medication side effects.   Per hospital discharge summary 5/30:  # HTN:  PTA amlodipine, metoprolol and  "lasix. Antihypertensives were held due to low BP secondary to SVT. With control of SVT after initiation of diltiazem as above, BP improved. Amlodipine was discontinued given addition of diltiazem. Metoprolol held on discharge. Hydralazine and lasix resumed prior to discharge.   - Please check BP on right arm only    Hx MI: currently taking aspirin 81mg daily, atorvastatin 40mg daily. Denies angina. Asking for refill of Nitroglycerin SL to have on hand.     constipation: currently taking docusate 200mg daily and PRN Miralax. Has been working well for her.     Hypothyroidism: Patient is taking levothyroxine 200 mcg daily. Patient is having the following symptoms: hyperthyroidism -  weight loss.   TSH   Date Value Ref Range Status   06/20/2019 0.35 (L) 0.40 - 4.00 mU/L Final     smoking: She is currently smoking 3 cig/day and continues to work on quitting. She would like to have nicotine lozenges available to help her quit. Per [facility] pharmacist, she apparently has to meet with them for a smoking cessation visit to  the lozenges.      Today's Vitals:   BP Readings from Last 1 Encounters:   08/21/19 138/62     Pulse Readings from Last 1 Encounters:   08/21/19 65     Wt Readings from Last 1 Encounters:   08/21/19 126 lb 8 oz (57.4 kg)     Ht Readings from Last 1 Encounters:   05/02/19 5' 4\" (1.626 m)     Estimated body mass index is 21.71 kg/m  as calculated from the following:    Height as of 5/2/19: 5' 4\" (1.626 m).    Weight as of an earlier encounter on 8/21/19: 126 lb 8 oz (57.4 kg).    Temp Readings from Last 1 Encounters:   08/21/19 97.3  F (36.3  C)         ASSESSMENT:              Current medications were reviewed today as discussed above.      Medication Adherence: fair, needs improvement - see below    COPD: needs improvement. Pt is unable to use MDI. Limited formulary at [facility] pharmacy, will try Advair diskus. Unfortunately they do not have the breath-activated albuterol to replace MDI, will defer " discussion on switching to follow-up and try to get at [facility].  Pt would also benefit from having the ability to use a nebulizer at home. Sent Rx for albuterol nebs and given paper copy for nebulizer by Annamarie Maldonado. She was given instructions to pick this and Ensure up at [facility].     ESRD: Pt is regularly attending dialysis sessions and she will follow-up with nephrology as scheduled.    Diabetes: stable. A1c is well below goal <8% off medication. Unnecessary to SMBG at this time.     Hypertension: needs improvement. BP is at goal in clinic today <140//90, however quite elevated prior to dialysis. With recent multiple hospitalizations, she has not made the prescribed medication changes. Removed metoprolol and amlodipine from her pill box while in clinic and left furosemide at dose she had been taking, 20mg daily.  Discussed with PCP on 8/22 after med rec attempted with [facility] pharmacy as well as [facility] dialysis center. Will keep her off amlodipine and metoprolol, reorder diltiazem, and increase furosemide to 40mg daily.     Hx MI: stable, refilled Nitroglycerin.     Constipation: stable    Hypothyroidism: needs improvement. TSH slightly low at last check 6/20, but prior to that was elevated at 15 on 5/21. She appears to be taking levothyroxine as prescribed at this time. Consider TSH recheck prior to a possible dose change.     Smoking: needs improvement. Sent Rx for nicotine lozenges. She will need to have smoking cessation appt at [facility] to pick these up. Pharmacist will discuss with her.     PLAN:                  1. Called [facility] pharmacy and dialysis center for med rec prior to making medication changes. Faxed updated med list to [facility] after med changes completed (Attn: Katherine RIVERA).  2. Stop taking metoprolol and amlodipine per med list, removed from her pill box at her visit  3. Restart diltiazem XR 240mg daily  4. Increase furosemide to 40mg daily  5. Stop Dulera. Start  Advair diskus 250mcg BID  6. Start albuterol neb QID prn    I spent 40 minutes with this patient today. All changes were made via collaborative practice agreement with Ailyn Nieves. A copy of the visit note was provided to the patient's primary care provider.     Will follow up in 2 weeks.    The patient was given a summary of these recommendations as an after visit summary.    Carmen Elder, PharmD, BCACP

## 2019-08-22 RX ORDER — ALBUTEROL SULFATE 0.83 MG/ML
2.5 SOLUTION RESPIRATORY (INHALATION) EVERY 6 HOURS PRN
Qty: 180 ML | Refills: 3 | Status: ON HOLD | OUTPATIENT
Start: 2019-08-22 | End: 2020-02-27

## 2019-08-22 RX ORDER — DOCUSATE SODIUM 100 MG/1
200 CAPSULE, LIQUID FILLED ORAL DAILY
Qty: 60 CAPSULE | Refills: 4 | Status: ON HOLD | COMMUNITY
Start: 2019-08-22 | End: 2020-02-01

## 2019-08-22 RX ORDER — NITROGLYCERIN 0.4 MG/1
0.4 TABLET SUBLINGUAL EVERY 5 MIN PRN
Qty: 25 TABLET | Refills: 0 | Status: ON HOLD | OUTPATIENT
Start: 2019-08-22 | End: 2020-02-28

## 2019-08-22 RX ORDER — CHOLECALCIFEROL (VITAMIN D3) 50 MCG
2000 TABLET ORAL DAILY
Qty: 90 TABLET | Refills: 3 | Status: ON HOLD | OUTPATIENT
Start: 2019-08-22 | End: 2020-02-28

## 2019-08-22 RX ORDER — FUROSEMIDE 20 MG
40 TABLET ORAL DAILY
Qty: 60 TABLET | Refills: 3 | Status: ON HOLD | OUTPATIENT
Start: 2019-08-22 | End: 2019-11-27

## 2019-08-22 RX ORDER — DILTIAZEM HYDROCHLORIDE 240 MG/1
240 CAPSULE, COATED, EXTENDED RELEASE ORAL DAILY
Qty: 30 CAPSULE | Refills: 3 | Status: SHIPPED | OUTPATIENT
Start: 2019-08-22 | End: 2019-09-04

## 2019-08-23 RX ORDER — IPRATROPIUM BROMIDE AND ALBUTEROL SULFATE 2.5; .5 MG/3ML; MG/3ML
1 SOLUTION RESPIRATORY (INHALATION) 4 TIMES DAILY
Qty: 1 BOX | Refills: 1 | Status: SHIPPED | OUTPATIENT
Start: 2019-08-23 | End: 2019-09-04

## 2019-08-27 ENCOUNTER — OFFICE VISIT (OUTPATIENT)
Dept: FAMILY MEDICINE | Facility: CLINIC | Age: 74
End: 2019-08-27
Payer: COMMERCIAL

## 2019-08-27 VITALS
WEIGHT: 126 LBS | SYSTOLIC BLOOD PRESSURE: 138 MMHG | HEART RATE: 71 BPM | RESPIRATION RATE: 18 BRPM | BODY MASS INDEX: 21.63 KG/M2 | DIASTOLIC BLOOD PRESSURE: 64 MMHG | OXYGEN SATURATION: 96 % | TEMPERATURE: 97 F

## 2019-08-27 DIAGNOSIS — G89.29 OTHER CHRONIC PAIN: ICD-10-CM

## 2019-08-27 DIAGNOSIS — Z79.899 MEDICATION MANAGEMENT: ICD-10-CM

## 2019-08-27 DIAGNOSIS — Z99.2 DIALYSIS PATIENT (H): ICD-10-CM

## 2019-08-27 DIAGNOSIS — G89.29 CHRONIC LOW BACK PAIN WITHOUT SCIATICA, UNSPECIFIED BACK PAIN LATERALITY: ICD-10-CM

## 2019-08-27 DIAGNOSIS — Z00.00 ENCOUNTER FOR MEDICARE ANNUAL WELLNESS EXAM: Primary | ICD-10-CM

## 2019-08-27 DIAGNOSIS — M54.50 CHRONIC LOW BACK PAIN WITHOUT SCIATICA, UNSPECIFIED BACK PAIN LATERALITY: ICD-10-CM

## 2019-08-27 DIAGNOSIS — J44.9 CHRONIC OBSTRUCTIVE PULMONARY DISEASE, UNSPECIFIED COPD TYPE (H): ICD-10-CM

## 2019-08-27 PROCEDURE — G0439 PPPS, SUBSEQ VISIT: HCPCS | Performed by: NURSE PRACTITIONER

## 2019-08-27 NOTE — Clinical Note
Hi, this is Ailyn. Do you have this patient on your list, if not. I would like for you to connect with her if possible for medication set-up. Thanks, Ailyn.

## 2019-08-27 NOTE — PROGRESS NOTES
"  SUBJECTIVE:   Kim Anne is a 73 year old female who presents for Preventive Visit.    Are you in the first 12 months of your Medicare Part B coverage?  No    Brother in law passed away yesterday- very unexpected, [place] or tomorrow morning.     Physical Health:    In general, how would you rate your overall physical health? excellent    Outside of work, how many days during the week do you exercise? 6-7 days/week    Outside of work, approximately how many minutes a day do you exercise?less than 15 minutes    If you drink alcohol do you typically have >3 drinks per day or >7 drinks per week? No    Do you usually eat at least 4 servings of fruit and vegetables a day, include whole grains & fiber and avoid regularly eating high fat or \"junk\" foods? Yes.     Do you have any problems taking medications regularly?  YES    Do you have any side effects from medications? none    Needs assistance for the following daily activities: no assistance needed    Which of the following safety concerns are present in your home?  none identified     Hearing impairment: Yes    In the past 6 months, have you been bothered by leaking of urine? No    Patient is on dialysis three times per week:  T/Thu/Sat    Mental Health:    In general, how would you rate your overall mental or emotional health? good      Do you feel safe in your environment? Yes    Do you have a Health Care Directive? Yes: Advance Directive has been received and scanned.    Additional concerns to address?  YES - Physical needed for Driving    Fall risk:  Fallen 2 or more times in the past year?: No  Any fall with injury in the past year?: No    Cognitive Screenin) Repeat 3 items (Leader, Season, Table)    2) Clock draw: NORMAL  3) 3 item recall: Recalls 1 object   Results: NORMAL clock, 1-2 items recalled: COGNITIVE IMPAIRMENT LESS LIKELY    Mini-CogTM Copyright S Chris. Licensed by the author for use " in [facility]; reprinted with permission (adeline@[facility]). All rights reserved.      Do you have sleep apnea, excessive snoring or daytime drowsiness?: no        Diabetes Follow-up: Patient reports checking blood sugars every once in a while. Diabetes is currently resolved, no medications or need for blood sugar checks.       Hypertension Follow-up  Patient denies any headache, chest pain, shortness of breath, heart palpitations or syncopal episodes.     Do you check your blood pressure regularly outside of the clinic? No     Are you following a low salt diet? No    Are your blood pressures ever more than 140 on the top number (systolic) OR more   than 90 on the bottom number (diastolic), for example 140/90? Yes    BP Readings from Last 2 Encounters:   08/27/19 138/64   08/21/19 138/62     Hemoglobin A1C (%)   Date Value   05/01/2019 5.2   01/04/2019 5.5     LDL Cholesterol Calculated (mg/dL)   Date Value   10/19/2018 46   10/25/2017 104 (H)         Reviewed and updated as needed this visit by Provider        Social History     Tobacco Use     Smoking status: Former Smoker     Packs/day: 0.25     Years: 40.00     Pack years: 10.00     Types: Cigarettes     Smokeless tobacco: Never Used   Substance Use Topics     Alcohol use: No     Alcohol/week: 0.0 oz                           Current providers sharing in care for this patient include:   Patient Care Team:  Ailyn Nieves APRN CNP as PCP - General (Nurse Practitioner - Gerontology)  Batool Mai RN as Lead Care Coordinator  Cyn Gabriel MD as MD (Ophthalmology)  Emily Cordova MD as MD (Internal Medicine)  Eris Villela MD as Referring Physician (Nephrology)  Emma Akbar RN as Nurse Coordinator (Cardiology)  Edilberto Maloney MD as MD (Nephrology)  Michelle Jackson MD as Hospitalist (Internal Medicine)  Marcus Frias MD as MD (Cardiology)  Serge Funez MD as MD (Internal Medicine)  Ailyn Nieves APRN  EVA as Assigned PCP  Carmen Elder [facility] as Pharmacist (Pharmacist)    The following health maintenance items are reviewed in Epic and correct as of today:  Health Maintenance   Topic Date Due     SPIROMETRY  09/28/2018     PHQ-2  01/01/2019     URINE DRUG SCREEN  04/11/2019     MICROALBUMIN  07/26/2019     FALL RISK ASSESSMENT  08/13/2019     ZOSTER IMMUNIZATION (3 of 3) 11/30/2018     INFLUENZA VACCINE (1) 09/01/2019     MEDICARE ANNUAL WELLNESS VISIT  10/19/2019     LIPID  10/19/2019     A1C  11/01/2019     MAMMO SCREENING  02/20/2020     DIABETIC FOOT EXAM  04/03/2020     BMP  06/21/2020     EYE EXAM  07/15/2020     DTAP/TDAP/TD IMMUNIZATION (4 - Td) 05/18/2021     TSH W/FREE T4 REFLEX  06/20/2021     ADVANCE CARE PLANNING  05/10/2022     COLONOSCOPY  03/13/2028     DEXA  Completed     HEPATITIS C SCREENING  Completed     COPD ACTION PLAN  Completed     PNEUMOCOCCAL IMMUNIZATION 65+ HIGH/HIGHEST RISK  Completed     IPV IMMUNIZATION  Aged Out     MENINGITIS IMMUNIZATION  Aged Out     Labs reviewed in EPIC  BP Readings from Last 3 Encounters:   08/27/19 138/64   08/21/19 138/62   08/16/19 (!) 163/53    Wt Readings from Last 3 Encounters:   08/27/19 57.2 kg (126 lb)   08/21/19 57.4 kg (126 lb 8 oz)   08/16/19 57 kg (125 lb 9.6 oz)                  Patient Active Problem List   Diagnosis     Hyperlipidemia LDL goal <100     ARAUZ (dyspnea on exertion)     COPD (chronic obstructive pulmonary disease) (H)     Edema     Fatigue     History of MI (myocardial infarction)     Snores     Knee pain, left     Bunion of great toe of left foot     Dystrophic nail     Arthritis     Peripheral vascular disease (H)     Chronic low back pain     Health Care Home     CKD (chronic kidney disease) stage 4, GFR 15-29 ml/min (H)     Abnormal gait     Health maintenance examination     Vitamin D deficiency     Constipation     Hypertension goal BP (blood pressure) < 130/80     Bradycardia     Callus of foot     Other chronic pain      Cataracts, bilateral     Hyperopic astigmatism of both eyes     Presbyopia     Asymptomatic bunion, right     Acquired hypothyroidism     Type 2 diabetes mellitus with diabetic chronic kidney disease (H)     Hypertension associated with diabetes (H)     Hyperlipidemia associated with type 2 diabetes mellitus (H)     Obesity (BMI 30-39.9)     History of sick sinus syndrome     Nephrotic syndrome     Pneumonia     Grief     Tobacco dependence     HCOM with LVOT     Hyperkalemia     Type 2 diabetes, HbA1C goal < 8% (H)     Smoker     Single kidney     Hypothyroid     Hypertension goal BP (blood pressure) < 140/90     Hyperlipidemia     Diabetic nephropathy (H)     Hypoalbuminemia     Skin lesion of hand     ERRONEOUS ENCOUNTER--DISREGARD     JUSTINE (generalized anxiety disorder)     Atypical chest pain     COPD exacerbation (H)     Past Surgical History:   Procedure Laterality Date     APPENDECTOMY       BLEPHAROPLASTY BILATERAL  9/18/2013    Procedure: BLEPHAROPLASTY BILATERAL;  BILATERAL UPPER EYELID BLEPHAROPLASTY ;  Surgeon: Olayinka Lyon MD;  Location:  [facility]     CATARACT IOL, RT/LT Bilateral 2016     CHOLECYSTECTOMY       COLONOSCOPY  7/15/2011    polyps repeat in 5 years     CV CORONARY ANGIOGRAM N/A 5/23/2019    Procedure: Coronary Angiogram;  Surgeon: Kunal Nj MD;  Location:  [facility]     elected term pregnancy       HYSTEROSCOPIC PLACEMENT CONTRACEPTIVE DEVICE       IR CVC TUNNEL PLACEMENT > 5 YRS OF AGE  5/2/2019     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 No family hx of      Hypertension No family hx of      Cerebrovascular Disease No family hx of      Breast Cancer No family hx of      Cancer - colorectal No family hx of      Prostate Cancer No family hx of      Allergies No family hx of      Alzheimer Disease No family hx of      Anesthesia Reaction No family hx of      Arthritis No family hx of      Blood Disease No family hx of      Cancer No family hx of      Cardiovascular No family hx of      Circulatory No family hx of      Congenital Anomalies No family hx of      Connective Tissue Disorder No family hx of      Depression No family hx of      Eye Disorder No family hx of      Genetic Disorder No family hx of      Gastrointestinal Disease No family hx of      Genitourinary Problems No family hx of      Gynecology No family hx of      Heart Disease No family hx of      Lipids No family hx of      Musculoskeletal Disorder No family hx of      Neurologic Disorder No family hx of      Obesity No family hx of      Osteoporosis No family hx of      Psychotic Disorder No family hx of      Respiratory No family hx of      Thyroid Disease No family hx of      Glaucoma No family hx of      Macular Degeneration No family hx of          Current Outpatient Medications   Medication Sig Dispense Refill     acetaminophen (TYLENOL) 325 MG tablet Take 2 tablets (650 mg) by mouth every 4 hours as needed for mild pain       albuterol (PROAIR HFA/PROVENTIL HFA/VENTOLIN HFA) 108 (90 Base) MCG/ACT inhaler Inhale 2 puffs into the lungs every 6 hours as needed for shortness of breath / dyspnea or wheezing 18 g 3     albuterol (PROVENTIL) (2.5 MG/3ML) 0.083% neb solution Take 1 vial (2.5 mg) by nebulization every 6 hours as needed for shortness of breath / dyspnea or wheezing 180 mL 3     ammonium lactate (LAC-HYDRIN) 12 % external  lotion Apply topically 2 times daily 225 g 0     ASPIR-LOW 81 MG EC tablet Take 1 tablet (81 mg) by mouth daily 90 tablet 3     atorvastatin (LIPITOR) 40 MG tablet Take 1 tablet (40 mg) by mouth daily 90 tablet 1     blood glucose monitoring (FREESTYLE LITE) test strip TEST BLOOD SUGAR TWO TIMES A DAY 50 strip 12     blood glucose monitoring (FREESTYLE) lancets Test BS two times daily as directed 100 each 1     diltiazem ER COATED BEADS (CARDIZEM CD/CARTIA XT) 240 MG 24 hr capsule Take 1 capsule (240 mg) by mouth daily 30 capsule 3     docusate sodium (COLACE) 100 MG capsule Take 2 capsules (200 mg) by mouth daily 60 capsule 4     Emollient (EUCERIN CALMING DAILY MOIST) CREA Externally apply 1 dose. topically daily 1 Tube 12     fluticasone (FLONASE) 50 MCG/ACT nasal spray Spray 1 spray into both nostrils daily 9.9 mL 1     fluticasone-salmeterol (ADVAIR) 250-50 MCG/DOSE inhaler Inhale 1 puff into the lungs every 12 hours 1 Inhaler 5     furosemide (LASIX) 20 MG tablet Take 2 tablets (40 mg) by mouth daily 60 tablet 3     ipratropium - albuterol 0.5 mg/2.5 mg/3 mL (DUONEB) 0.5-2.5 (3) MG/3ML neb solution Take 1 vial (3 mLs) by nebulization 4 times daily 1 Box 1     levothyroxine (SYNTHROID/LEVOTHROID) 200 MCG tablet Take 1 tablet (200 mcg) by mouth daily 90 tablet 1     multivitamin RENAL (NEPHROCAPS/TRIPHROCAPS) 1 MG capsule Take 1 capsule by mouth daily       nicotine (COMMIT) 2 MG lozenge Place 1 lozenge (2 mg) inside cheek every hour as needed for smoking cessation 20 lozenge 1     nitroGLYcerin (NITROSTAT) 0.4 MG sublingual tablet Place 1 tablet (0.4 mg) under the tongue every 5 minutes as needed for chest pain 25 tablet 0     nystatin (MYCOSTATIN) 439061 UNIT/GM POWD Apply 1 g topically 3 times daily as needed 30 g 1     order for DME Equipment being ordered: Nebulizer 1 Device 0     order for DME Equipment being ordered: Boost. 6 Can 3     order for DME Equipment being ordered: cane 1 each 0     order for DME  Equipment being ordered: Diabetic Shoes 1 each 0     order for DME Equipment being ordered: two pairs moderate knee high support hose 2 Device 1     order for DME Equipment being ordered: Compression socks.  Strength:15-20 mmHg 2 each 0     order for DME One wheeled walker with seat and brakes and basket 1 Device 0     oxyCODONE IR (ROXICODONE) 10 MG tablet Take 0.5-1 tablets (5-10 mg) by mouth every 8 hours as needed for moderate to severe pain 90 tablet 0     polyethylene glycol (MIRALAX/GLYCOLAX) packet Take 17 g by mouth daily as needed for constipation 10 packet 0     vitamin D3 (CHOLECALCIFEROL) 2000 units (50 mcg) tablet Take 1 tablet (2,000 Units) by mouth daily 90 tablet 3     Allergies   Allergen Reactions     Contrast Dye Hives and Itching     Clonidine      She had as IP and thinks it made her itchy     Diatrizoate Other (See Comments)     Diltiazem      Severe bradycardia     Iodine-131      Recent Labs   Lab Test 06/21/19  0610 06/20/19  1400 06/19/19  2209  05/23/19  0445 05/22/19  0335  05/21/19  0159  05/01/19  1748  01/04/19  1426  10/19/18  0939  07/26/18  1116  10/25/17  0639  09/07/17  1135   A1C  --   --   --   --   --   --   --   --   --  5.2  --  5.5  --   --   --  5.8*   < > 6.6*  --  6.4*   LDL  --   --   --   --   --   --   --   --   --   --   --   --   --  46  --   --   --  104*  --  81   HDL  --   --   --   --   --   --   --   --   --   --   --   --   --  77  --   --   --  67  --  75   TRIG  --   --   --   --   --   --   --   --   --   --   --   --   --  188*  --   --   --  212*  --  283*   ALT  --   --  17  --  10 10  --   --    < >  --   --   --   --   --   --   --   --  16  --  18   CR 2.33*  --  3.27*   < > 2.97* 2.24*   < >  --    < >  --    < >  --    < >  --    < >  --    < > 2.77*   < > 2.70*   GFRESTIMATED 20*  --  13*   < > 15* 21*   < >  --    < >  --    < >  --    < >  --    < >  --    < > 17*   < > 17*   GFRESTBLACK 23*  --  15*   < > 17* 24*   < >  --    < >  --    < >  --   "  < >  --    < >  --    < > 20*   < > 21*   POTASSIUM 4.1 4.1 3.6   < > 3.8 3.8   < >  --    < > 6.4*   < >  --    < >  --    < >  --    < > 4.3   < > 6.2*   TSH  --  0.35*  --   --   --   --   --  15.80*  --   --   --   --   --   --   --  1.12  --   --    < > 71.77*    < > = values in this interval not displayed.        ROS:  Constitutional, HEENT, cardiovascular, pulmonary, gi and gu systems are negative, except as otherwise noted.    OBJECTIVE:   /64   Pulse 71   Temp 97  F (36.1  C)   Resp 18   Wt 57.2 kg (126 lb)   SpO2 96%   BMI 21.63 kg/m   Estimated body mass index is 21.71 kg/m  as calculated from the following:    Height as of 5/2/19: 1.626 m (5' 4\").    Weight as of 8/21/19: 57.4 kg (126 lb 8 oz).  EXAM:   GENERAL: healthy, alert and no distress  EYES: Eyes grossly normal to inspection, PERRL and conjunctivae and sclerae normal  HENT: [place] of hearing; normal cephalic/atraumatic, ear canals and TM's normal, nose and mouth without ulcers or lesions, oropharynx clear and oral mucous membranes moist  NECK: no adenopathy, no asymmetry, masses, or scars and thyroid normal to palpation  RESP: rhonchi bilateral posterior.   CV: regular rate and rhythm, normal S1 S2, no S3 or S4, no murmur, click or rub, no peripheral edema and peripheral pulses strong  ABDOMEN: soft, nontender, no hepatosplenomegaly, no masses and bowel sounds normal  MS: no gross musculoskeletal defects noted, no edema  NEURO: generalized weakness, mentation intact and speech normal  PSYCH: mentation appears normal    Diagnostic Test Results:  Labs reviewed in Epic    ASSESSMENT / PLAN:   1. 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 Patient is under the care of several providers but is seen here at this clinic for her overall medical care.     2. Chronic obstructive pulmonary disease, unspecified COPD type (H)  Uncontrolled. Patient has a history of COPD " "that is not well managed at his time. Patient is struggling with keeping up with the right medications. Pharmacy was able to go through medications and call dialysis and patient's pharmacy to reconcile her medications. Community paramedics will also be set-up to help patient with her medication set-up.     3. Other chronic pain  Improving pain control. Patient is on oxycodone 10 mg every 8 hours as needed for pain management. Patient has been compliant without any concerns or requesting her refills too soon.     4. Dialysis patient (H)  Patient is dialyzed three times per week, and will have a follow-up appointment with nephrology for ongoing care.       End of Life Planning:  Patient currently has an advanced directive: Yes.      COUNSELING:  Reviewed preventive health counseling, as reflected in patient instructions       Healthy diet/nutrition       Fall risk prevention    Estimated body mass index is 21.71 kg/m  as calculated from the following:    Height as of 5/2/19: 1.626 m (5' 4\").    Weight as of 8/21/19: 57.4 kg (126 lb 8 oz).    Weight management plan return visit in 1 Month and prescription for boost was sent to [facility].      reports that she has quit smoking. Her smoking use included cigarettes. She has a 10.00 pack-year smoking history. She has never used smokeless tobacco.      Appropriate preventive services were discussed with this patient, including applicable screening as appropriate for cardiovascular disease, diabetes, osteopenia/osteoporosis, and glaucoma.  As appropriate for age/gender, discussed screening for colorectal cancer, prostate cancer, breast cancer, and cervical cancer. Checklist reviewing preventive services available has been given to the patient.    Reviewed patients plan of care and provided an AVS. The Complex Care Plan (for patients with higher acuity and needing more deliberate coordination of services) for Kim meets the Care Plan requirement. This Care Plan has " been established and reviewed with the Patient.    Counseling Resources:  ATP IV Guidelines  Pooled Cohorts Equation Calculator  Breast Cancer Risk Calculator  FRAX Risk Assessment  ICSI Preventive Guidelines  Dietary Guidelines for Americans, 2010  USDA's MyPlate  ASA Prophylaxis  Lung CA Screening    JUNIOR Lopez CNP  [facility] PRIMARY [facility]

## 2019-08-27 NOTE — PATIENT INSTRUCTIONS
- medications at the pharmacy.   -Bring all medications to the next visit.   -Call clinic with any questions or concerns.     Patient Education   Personalized Prevention Plan  You are due for the preventive services outlined below.  Your care team is available to assist you in scheduling these services.  If you have already completed any of these items, please share that information with your care team to update in your medical record.  Health Maintenance Due   Topic Date Due     Breathing Capacity Test  09/28/2018     PHQ-2  01/01/2019     URINE DRUG SCREEN  04/11/2019     Kidney Microalbumin Urine Test  07/26/2019     FALL RISK ASSESSMENT  08/13/2019     Zoster (Shingles) Vaccine (3 of 3) 11/30/2018

## 2019-08-28 ENCOUNTER — DOCUMENTATION ONLY (OUTPATIENT)
Dept: BEHAVIORAL HEALTH | Facility: CLINIC | Age: 74
End: 2019-08-28

## 2019-08-28 ENCOUNTER — PATIENT OUTREACH (OUTPATIENT)
Dept: GERIATRIC MEDICINE | Facility: CLINIC | Age: 74
End: 2019-08-28

## 2019-08-28 NOTE — PROGRESS NOTES
[facility]al [facility] Home Services  NON [facility]      Social Work Care Navigator Note      Patient: Kim Anne  Date: August 28, 2019  Preferred Name: Kim    Previous PHQ-9:   PHQ-9 SCORE 2/12/2018 3/12/2018 6/25/2018   PHQ-9 Total Score - - -   PHQ-9 Total Score - - -   PHQ-9 Total Score 0 0 0     Previous JUSTINE-7:   JUSTINE-7 SCORE 12/12/2016 3/12/2018 6/25/2018   Total Score - - -   Total Score 0 0 0   Total Score - - -     NAE LEVEL:  NAE Score (Last Two) 2/18/2013 5/23/2014   NAE Raw Score 48 45   Activation Score 80 73.1   NAE Level 4 4     Preferred Contact:  No data recorded    Type of Contact Today: DOCUMENTATION ONLY      Data: (subjective / Objective):  Warm hand off from [facility] Community Health Worker asking if I would please screen for [facility] eligibility. Pt meets criteria as far as having an MA product and MH dx but she alos has a [employer]  following.     for Batool Mai [facility]  [phone] requesting a call back to discuss pt's needs/plan of care.    Will route note to Behavioral Health Clinician and PCP.    Karin Celaya [facility] Care Coordinator-  [facility]  Tele: [phone]      Next 5 appointments (look out 90 days)    Sep 24, 2019  9:00 AM CDT  Office Visit with Carmen Elder, [facility] ([facility]) [address] [place] [address]  [address] [place] [id number] [phone]   Sep 24, 2019  9:00 AM CDT  Return Visit with Yoan Pichardo [facility] ([facility]) [address]  [address] [place] [id number] [phone]   Sep 24, 2019  9:00 AM CDT  Return Visit with JUNIOR Bishop [facility] ([facility]) [address]  [address] [place] [id number] [phone]

## 2019-08-29 ENCOUNTER — PATIENT OUTREACH (OUTPATIENT)
Dept: BEHAVIORAL HEALTH | Facility: CLINIC | Age: 74
End: 2019-08-29

## 2019-08-29 NOTE — PROGRESS NOTES
Left message with triage nurse for PCP at [facility] about pt needs and Community Paramedic referral.

## 2019-08-29 NOTE — PROGRESS NOTES
[facility] Care Coordination Contact    Spoke to home care ally,  All disciplines discharged due to unable to reach/no shows.   Batool Mai RN, BSN, PHN  [facility]  [phone]  Fax: [phone]

## 2019-08-30 NOTE — PROGRESS NOTES
"[facility] [place]   Integrated Behavioral Health Services   Diagnostic Assessment      PATIENT'S NAME: Kim Anne  MRN:   [id number]  :   1945  DATE OF SERVICE: 2019  SERVICE LOCATION: Face to Face in Clinic  Visit Activities: [facility]      Identifying Information:  Patient is a 73 year old year old, ,  female.  Patient attended the session alone.        Referral:  Patient was referred for an assessment by PCP at [facility].   Reason for referral: clarify behavioral health diagnosis, determine behavioral health treatment options, assess client readiness and motivation to change, assess client social situation, address the interaction of behavioral and medical issues and consultation on complex comorbid conditions.       Patient's Statement of Presenting Concern:  Patient reports the following reason(s) for seeking an assessment at this time: \"I've been feeling pretty down because of my health and people dying\".  Patient stated that her symptoms have resulted in the following functional impairments: chronic disease management, health maintenance, money management and organization      History of Presenting Concern:  Patient reports that these problem(s) began around the onset of several medical conditions including chronic back pain, COPD, and chronic kidney disease (on dialysis). Patient endorses depressive symptoms including low energy, \"I hardly eat,\" and difficulty completing tasks. Patient states these symptoms have made it challenging to successfully manage her medical health needs, noting medications compliance is a challenge. Patient reports times of increased anxiety over the past 2 years due to the deaths of several loved ones including her best friend and son's friend (per 19 Xochitl Ponce [facility] Intern note).   Patient has attempted to resolve these concerns in the past through: medication(s) from " "physician / PCP, physician / PCP, primary care behavioral health provider and [facility] Pharmacist. Patient reports that other professional(s) are involved in providing support / services.      Social History:  Patient reported she grew up in [place], [place] and [place], [place] with her mother and siblings. She is the fifth born of 8 children.  Patient report being close to her family all throughout her life. Patient reported that her childhood was \"alright.\" Patient reports, \"I was generally happy as a kid.\"  Patient reported a history of 1 committed relationships or marriages. Patient has been  for \"many\" years. Patient reported having 2, children 50 year old son and 47 year old daughter. Patient identified extensive stable and meaningful social connections. Patient reports her siblings, children, and grandchildren are major supports. She spend times with them often.    Patient lives with her son, granddaughter, and grandson.  Patient is currently disabled.  Work history includes a nurses aide many years ago, patient was unable to recall any other past jobs. Patient has not worked for many years noting finances have always been an issue.     Patient reported that she has not been involved with the legal system.  Patient's highest education level was some high school but no degreedropped out at 11 th grade. Patient did not identify any learning problems. There are ethnic, cultural or Synagogue factors that may be relavent for therapy. These factors will be addressed in the Preliminary Treatment plan. Patient identifies as .  Patient did not serve in the .       Mental Health History:  Patient reported no family history of mental health issues. Patient reports her mood is \"alright.\" She states the onset of depression began around the time she began to experience significant medical issues including chronic kidney disease (on dialysis), COPD, and chronic back pain. Patient reports " "symptoms including low energy, low appetite, difficulty complete tasks, and challenges managing her medical needs. Patient notes particular difficulty keeping track of her medications. Patient reports a decrease in her overall functioning noting difficulty walking long distances, washing her body, standing for long periods of times, and hearing.Patient has difficulty hearing during the visit, she is able to hear and respond appropriately when speaking at a louder volume. Patient reports times of increases anxiety over the past 2 years due to the deaths of several loved ones including her best friend and son's friend (per 2/1/19 Xochitl Ponce [facility] Intern note). Patient reports \"just accepting it,\" spending time with family, playing bingo, and going to the casino have helped the patient cope with medical and behavioral health symptoms. Patient has received the following mental health services in the past: physician / PCP and primary care behavioral health provider. Patient is currently receiving the following services: physician / PCP and [facility] Pharmacist.      Chemical Health History:  Patient reported no family history of chemical health issues. Patient has not received chemical dependency treatment in the past. Patient is not currently receiving any chemical dependency treatment. Patient reports no problems as a result of their drinking / drug use. Patient current smokes 3 cigarettes a day.      Cage-AID score is: 0 Based on Cage-Aid score and clinical interview there  are not indications of drug or alcohol abuse.      Discussed the general effects of drugs and alcohol on health and well-being.      Significant Losses / Trauma / Abuse / Neglect Issues:  There are indications or report of significant loss, trauma, abuse or neglect issues related to: death of several loved ones including best friend.    Issues of possible neglect are not present.      Medical History:   Patient Active Problem List   Diagnosis     " Hyperlipidemia LDL goal <100     ARAUZ (dyspnea on exertion)     COPD (chronic obstructive pulmonary disease) (H)     Edema     Fatigue     History of MI (myocardial infarction)     Snores     Knee pain, left     Bunion of great toe of left foot     Dystrophic nail     Arthritis     Peripheral vascular disease (H)     Chronic low back pain     Health Care Home     CKD (chronic kidney disease) stage 4, GFR 15-29 ml/min (H)     Abnormal gait     Health maintenance examination     Vitamin D deficiency     Constipation     Hypertension goal BP (blood pressure) < 130/80     Bradycardia     Callus of foot     Other chronic pain     Cataracts, bilateral     Hyperopic astigmatism of both eyes     Presbyopia     Asymptomatic bunion, right     Acquired hypothyroidism     Type 2 diabetes mellitus with diabetic chronic kidney disease (H)     Hypertension associated with diabetes (H)     Hyperlipidemia associated with type 2 diabetes mellitus (H)     Obesity (BMI 30-39.9)     History of sick sinus syndrome     Nephrotic syndrome     Pneumonia     Grief     Tobacco dependence     HCOM with LVOT     Hyperkalemia     Type 2 diabetes, HbA1C goal < 8% (H)     Smoker     Single kidney     Hypothyroid     Hypertension goal BP (blood pressure) < 140/90     Hyperlipidemia     Diabetic nephropathy (H)     Hypoalbuminemia     Skin lesion of hand     ERRONEOUS ENCOUNTER--DISREGARD     JUSTINE (generalized anxiety disorder)     Atypical chest pain     COPD exacerbation (H)     Dialysis patient (H)       Medication Review:  Current Outpatient Medications   Medication     acetaminophen (TYLENOL) 325 MG tablet     albuterol (PROAIR HFA/PROVENTIL HFA/VENTOLIN HFA) 108 (90 Base) MCG/ACT inhaler     albuterol (PROVENTIL) (2.5 MG/3ML) 0.083% neb solution     ammonium lactate (LAC-HYDRIN) 12 % external lotion     ASPIR-LOW 81 MG EC tablet     atorvastatin (LIPITOR) 40 MG tablet     blood glucose monitoring (FREESTYLE LITE) test strip     blood glucose  monitoring (FREESTYLE) lancets     diltiazem ER COATED BEADS (CARDIZEM CD/CARTIA XT) 240 MG 24 hr capsule     docusate sodium (COLACE) 100 MG capsule     Emollient (EUCERIN CALMING DAILY MOIST) CREA     fluticasone (FLONASE) 50 MCG/ACT nasal spray     fluticasone-salmeterol (ADVAIR) 250-50 MCG/DOSE inhaler     furosemide (LASIX) 20 MG tablet     ipratropium - albuterol 0.5 mg/2.5 mg/3 mL (DUONEB) 0.5-2.5 (3) MG/3ML neb solution     levothyroxine (SYNTHROID/LEVOTHROID) 200 MCG tablet     multivitamin RENAL (NEPHROCAPS/TRIPHROCAPS) 1 MG capsule     nicotine (COMMIT) 2 MG lozenge     nitroGLYcerin (NITROSTAT) 0.4 MG sublingual tablet     nystatin (MYCOSTATIN) 425516 UNIT/GM POWD     order for DME     order for DME     order for DME     order for DME     order for DME     order for DME     order for DME     oxyCODONE IR (ROXICODONE) 10 MG tablet     polyethylene glycol (MIRALAX/GLYCOLAX) packet     vitamin D3 (CHOLECALCIFEROL) 2000 units (50 mcg) tablet     No current facility-administered medications for this visit.        Patient was provided recommendation to follow-up with physician.    Mental Status Assessment:  Appearance:   Appropriate   Eye Contact:   Poor  Psychomotor Behavior: Agitated  Normal   Attitude:   Cooperative  Guarded at times  Orientation:   All  Speech   Rate / Production: Normal  Slow    Volume:  Soft   Mood:    Depressed  Irritable - patient stated she hadn't eaten all day  Affect:    Flat   Thought Content:  Clear   Thought Form:  Coherent   Insight:    Poor       Safety Assessment:    Patient denies a history of suicidal ideation, suicide attempts, self-injurious behavior, homicidal ideation, homicidal behavior and and other safety concerns  Patient denies current or recent suicidal ideation or behaviors.  Patient denies current or recent homicidal ideation or behaviors.  Patient denies current or recent self injurious behavior or ideation.  Patient denies other safety concerns.  Patient  reports there are no firearms in the house  Protective Factors Children in the home , Sense of responsibility to family, Reality testing ability, Positive coping skills and Positive social support   Risk Factors Sense of hopelessness and/or helplessness      Plan for Safety and Risk Management:  A safety and risk management plan has not been developed at this time, however patient was encouraged to call [place] [phone] should there be a change in any of these risk factors.      Review of Symptoms:  Depression: Interest Energy Concentration Appetite Psychomotor slowing or agitation Irritability  Magda:  No symptoms  Psychosis: No symptoms  Anxiety: No symptoms  Panic:  No symptoms  Post Traumatic Stress Disorder: No symptoms  Obsessive Compulsive Disorder: No symptoms  Eating Disorder: No symptoms  Oppositional Defiant Disorder: No symptoms  ADD / ADHD: No symptoms  Conduct Disorder: No symptoms    Patient's Strengths and Limitations:  Patient identified the following strengths or resources that will help her succeed in counseling: jodi / spirituality, friends / good social support, family support and strong social skills. Patient identified the following supports: family, Synagogue / spirituality and friends. Things that may interfere with the patient's success in behavioral health services include:financial hardship and physical health concerns.    Diagnostic Criteria:  A. The development of emotional or behavioral symptoms in response to an identifiable stressor(s) occurring within 3 months of the onset of the stressor(s)  B. These symptoms or behaviors are clinically significant, as evidenced by one or both of the following:       - Marked distress that is out of proportion to the severity/intensity of the stressor (with consideration for external context & culture)       - Significant impairment in social, occupational, or other important areas of functioning  C. The stress-related disturbance does not meet  "criteria for another disorder & is not not an exacerbation of another mental disorder  D. The symptoms do not represent normal bereavement  E. Once the stressor or its consequences have terminated, the symptoms do not persist for more than an additional 6 months       * Adjustment Disorder with Depressed Mood: The predominant manifestations are symptoms such as low mood, tearfulness, or feelings of hopelessness      Functional Status:  Patient's symptoms are causing reduced functional status in the following areas: Activities of Daily Living - difficulty completing daily tasks   Financial management - \"finances have always been an issue\"  Chronic disease management - variable medication compliance       DSM5 Diagnoses: (Sustained by DSM5 Criteria Listed Above)  Diagnoses: Adjustment Disorders  309.0 (F43.21) With depressed mood  Psychosocial & Contextual Factors: on disability, chronic diseases, extensive social network  WHODAS Score: 22  See Media section of EPIC medical record for completed WHODAS    Preliminary Treatment Plan:    Client's identified ethnic issues will be addressed by integrating patient's ethnic identity into treatment planning and sessions.    Initial Treatment will focus on: Depressed Mood - alleviate depressive symptoms   Chronic Disease Management - medication compliance.    Chemical dependency recommendations: No indications of CD issues    As a preliminary treatment goal, patient will develop coping/problem-solving skills to facilitate more adaptive adjustment.    The focus of initial interventions will be to alleviate depressed mood and collaborate with PCP and [facility] Pharmacist to increase ability to manage chronic diseases .    Collaboration with other professionals is not indicated at this time.    Referral to another professional/service is not indicated at this time..    A Release of Information is not needed at this time.    Report to child or adult protection services was ROSANA Rico " GRETCHEN Fitch, Behavioral Health Clinician   Reviewed/Supervised by: LAMINE Bess, [facility]

## 2019-09-03 ENCOUNTER — PATIENT OUTREACH (OUTPATIENT)
Dept: BEHAVIORAL HEALTH | Facility: CLINIC | Age: 74
End: 2019-09-03

## 2019-09-03 PROBLEM — F43.21 ADJUSTMENT DISORDER WITH DEPRESSED MOOD: Status: ACTIVE | Noted: 2019-09-03

## 2019-09-03 PROBLEM — F41.1 GAD (GENERALIZED ANXIETY DISORDER): Status: RESOLVED | Noted: 2018-08-30 | Resolved: 2019-09-03

## 2019-09-03 PROBLEM — I47.19 ATRIAL TACHYCARDIA (H): Status: ACTIVE | Noted: 2019-09-03

## 2019-09-03 PROBLEM — I42.2 HYPERTROPHIC CARDIOMYOPATHY (H): Chronic | Status: RESOLVED | Noted: 2017-09-11 | Resolved: 2019-09-03

## 2019-09-04 ENCOUNTER — PATIENT OUTREACH (OUTPATIENT)
Dept: BEHAVIORAL HEALTH | Facility: CLINIC | Age: 74
End: 2019-09-04

## 2019-09-04 ENCOUNTER — OFFICE VISIT (OUTPATIENT)
Dept: CARDIOLOGY | Facility: CLINIC | Age: 74
End: 2019-09-04
Attending: INTERNAL MEDICINE
Payer: COMMERCIAL

## 2019-09-04 VITALS
DIASTOLIC BLOOD PRESSURE: 65 MMHG | OXYGEN SATURATION: 95 % | HEIGHT: 68 IN | WEIGHT: 129 LBS | HEART RATE: 71 BPM | SYSTOLIC BLOOD PRESSURE: 121 MMHG | BODY MASS INDEX: 19.55 KG/M2

## 2019-09-04 DIAGNOSIS — M54.50 CHRONIC LOW BACK PAIN WITHOUT SCIATICA, UNSPECIFIED BACK PAIN LATERALITY: ICD-10-CM

## 2019-09-04 DIAGNOSIS — G89.29 CHRONIC LOW BACK PAIN WITHOUT SCIATICA, UNSPECIFIED BACK PAIN LATERALITY: ICD-10-CM

## 2019-09-04 DIAGNOSIS — I47.19 ATRIAL TACHYCARDIA (H): Primary | ICD-10-CM

## 2019-09-04 PROCEDURE — G0463 HOSPITAL OUTPT CLINIC VISIT: HCPCS | Mod: 27,ZF

## 2019-09-04 PROCEDURE — 99214 OFFICE O/P EST MOD 30 MIN: CPT | Mod: ZP | Performed by: INTERNAL MEDICINE

## 2019-09-04 PROCEDURE — 93005 ELECTROCARDIOGRAM TRACING: CPT | Mod: ZF

## 2019-09-04 PROCEDURE — 93010 ELECTROCARDIOGRAM REPORT: CPT | Mod: ZP | Performed by: INTERNAL MEDICINE

## 2019-09-04 RX ORDER — CARVEDILOL 6.25 MG/1
6.25 TABLET ORAL 2 TIMES DAILY WITH MEALS
Qty: 180 TABLET | Refills: 3 | Status: ON HOLD | OUTPATIENT
Start: 2019-09-04 | End: 2019-11-27

## 2019-09-04 ASSESSMENT — PAIN SCALES - GENERAL: PAINLEVEL: NO PAIN (0)

## 2019-09-04 ASSESSMENT — MIFFLIN-ST. JEOR: SCORE: 1138.64

## 2019-09-04 NOTE — TELEPHONE ENCOUNTER
Controlled Substance Refill Request for oxyCODONE IR (ROXICODONE) 10 MG tablet  Problem List Complete:  Yes   checked in past 3 months?  No, route to RN\

## 2019-09-04 NOTE — PROGRESS NOTES
Left message with a male for a return call about the [facility] program. 2nd attempt to contact the patient.
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
## 2019-09-04 NOTE — TELEPHONE ENCOUNTER
Requested Prescriptions   Pending Prescriptions Disp Refills     oxyCODONE IR (ROXICODONE) 10 MG tablet 90 tablet 0     Sig: Take 0.5-1 tablets (5-10 mg) by mouth every 8 hours as needed for moderate to severe pain       There is no refill protocol information for this order        Problem List Complete:    Yes    Last Written Prescription Date:  8/6/19  Last Fill Quantity: 90,   # refills: 0    [place] [facility] [place] [facility] [place]: 8/27/19    Future Office visit:   Next 5 appointments (look out 90 days)    Sep 24, 2019  9:00 AM CDT  Office Visit with Carmen Elder [facility] ([facility]) [address] [place] [address] [address]  [facility] [address]  [phone]   Sep 24, 2019  9:00 AM CDT  Return Visit with Yoan Pichardo [facility] ([facility]) [address]  [address]  [facility] [address]  [phone]   Sep 24, 2019  9:00 AM CDT  Return Visit with JUNIOR Bishop CNP  [facility] ([facility]) [address]  [address]  [facility] [address]  [phone]          Controlled substance agreement:   Encounter-Level CSA - 04/10/2018:    Controlled Substance Agreement - Scan on 4/18/2018  3:13 PM: CONTROLLED SUBSTANCE AGREEMENT (below)       Encounter-Level CSA - 02/13/2017:    Controlled Substance Agreement - Scan on 2/16/2017  2:30 PM: CONTROLLED SUBSTANCE AGREEMENT (below)       Encounter-Level CSA - 01/11/2017:    Controlled Substance Agreement - Scan on 1/16/2017  6:55 PM: CONTROLLED SUBSTANCE AGREEMENT (below)       Encounter-Level CSA - 03/07/2016:    Controlled Substance Agreement - Scan on 3/8/2016  4:25 AM: CONTROLLED SUBSTANCE AGREEMENT 03/07/16 (below)       Encounter-Level CSA - 03/04/2015:    Controlled Substance Agreement - Scan on 3/5/2015  9:09 AM: Controlled  Substance Agreement 03/04/15 (below)       Patient-Level CSA:    There are no patient-level csa.          Processing:  e-prescribe    [email or web address]   checked in past 3 months?  No, route to RN [place]  reviewed 9/4/2019 4:23 PM      Last refill: 8/7/19  Disp: 90    Concerns: NO

## 2019-09-04 NOTE — PATIENT INSTRUCTIONS
You were seen in the Electrophysiology today by: Dr. Cool    Plan:     Medication Changes:     Stop diltiazem    Start carvedilol 6.25mg twice a day    Follow up visit: 1 month with Debbi Talley NP     Further Instructions: see your PCP for your hip pain      Your Care Team:  EP Cardiology   Telephone Number     Nellie Jaron RIVERA [phone]     For scheduling appts or procedures:    Fatimah Benson   [phone]   For the Device Clinic (Pacemakers, ICDs, Loop Recorders)    During business hours: [phone]  After business hours:   [phone]- select option 4 and ask for job code [phone].       Cardiovascular Clinic:   [address] [place] [address]. [place] [address]      As always, Thank you for trusting us with your health care needs!                Patient Education     Patient Education    Carvedilol Oral capsule, extended-release    Carvedilol Oral tablet  Carvedilol Oral tablet  What is this medicine?  CARVEDILOL (ROXY ve dil ol) is a beta-blocker. Beta-blockers reduce the workload on the heart and help it to beat more regularly. This medicine is used to treat high blood pressure and heart failure.  This medicine may be used for other purposes; ask your health care provider or pharmacist if you have questions.  What should I tell my health care provider before I take this medicine?  They need to know if you have any of these conditions:    circulation problems    diabetes    history of heart attack or heart disease    liver disease    lung or breathing disease, like asthma or emphysema    pheochromocytoma    slow or irregular heartbeat    thyroid disease    an unusual or allergic reaction to carvedilol, other beta-blockers, medicines, foods, dyes, or preservatives    pregnant or trying to get pregnant    breast-feeding  How should I use this medicine?  Take this medicine by mouth with a glass of water. Follow the directions on the prescription label. It is best to take the tablets with food. Take  your doses at regular intervals. Do not take your medicine more often than directed. Do not stop taking except on the advice of your doctor or health care professional.  Talk to your pediatrician regarding the use of this medicine in children. Special care may be needed.  Overdosage: If you think you have taken too much of this medicine contact a poison control center or emergency room at once.  NOTE: This medicine is only for you. Do not share this medicine with others.  What if I miss a dose?  If you miss a dose, take it as soon as you can. If it is almost time for your next dose, take only that dose. Do not take double or extra doses.  What may interact with this medicine?  This medicine may interact with the following medications:    certain medicines for blood pressure, heart disease, irregular heart beat    certain medicines for depression, like fluoxetine or paroxetine    certain medicines for diabetes, like glipizide or glyburide    cimetidine    clonidine    cyclosporine    digoxin    MAOIs like Carbex, Eldepryl, Marplan, Nardil, and Parnate    reserpine    rifampin  This list may not describe all possible interactions. Give your health care provider a list of all the medicines, herbs, non-prescription drugs, or dietary supplements you use. Also tell them if you smoke, drink alcohol, or use illegal drugs. Some items may interact with your medicine.  What should I watch for while using this medicine?  Check your heart rate and blood pressure regularly while you are taking this medicine. Ask your doctor or health care professional what your heart rate and blood pressure should be, and when you should contact him or her. Do not stop taking this medicine suddenly. This could lead to serious heart-related effects.  Contact your doctor or health care professional if you have difficulty breathing while taking this drug.  Check your weight daily. Ask your doctor or health care professional when you should notify  him/her of any weight gain.  You may get drowsy or dizzy. Do not drive, use machinery, or do anything that requires mental alertness until you know how this medicine affects you. To reduce the risk of dizzy or fainting spells, do not sit or stand up quickly. Alcohol can make you more drowsy, and increase flushing and rapid heartbeats. Avoid alcoholic drinks.  If you have diabetes, check your blood sugar as directed. Tell your doctor if you have changes in your blood sugar while you are taking this medicine.  If you are going to have surgery, tell your doctor or health care professional that you are taking this medicine.  What side effects may I notice from receiving this medicine?  Side effects that you should report to your doctor or health care professional as soon as possible:    allergic reactions like skin rash, itching or hives, swelling of the face, lips, or tongue    breathing problems    dark urine    irregular heartbeat    swollen legs or ankles    vomiting    yellowing of the eyes or skin  Side effects that usually do not require medical attention (report to your doctor or health care professional if they continue or are bothersome):    change in sex drive or performance    diarrhea    dry eyes (especially if wearing contact lenses)    dry, itching skin    headache    nausea    unusually tired  This list may not describe all possible side effects. Call your doctor for medical advice about side effects. You may report side effects to FDA at 2-808-FDA-2728.  Where should I keep my medicine?  Keep out of the reach of children.  Store at room temperature below 30 degrees C (86 degrees F). Protect from moisture. Keep container tightly closed. Throw away any unused medicine after the expiration date.  NOTE:This sheet is a summary. It may not cover all possible information. If you have questions about this medicine, talk to your doctor, pharmacist, or health care provider. Copyright  2016 [employer] Standard

## 2019-09-04 NOTE — NURSING NOTE
Chief Complaint   Patient presents with     Follow Up     3 mo follow-up AT inpt - started dilt. Review zio     Vitals were taken and medications were reconciled. EKG was performed    Татьяна JONES  12:59 PM
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
## 2019-09-05 LAB — INTERPRETATION ECG - MUSE: NORMAL

## 2019-09-05 RX ORDER — OXYCODONE HYDROCHLORIDE 10 MG/1
5-10 TABLET ORAL EVERY 8 HOURS PRN
Qty: 90 TABLET | Refills: 0 | Status: SHIPPED | OUTPATIENT
Start: 2019-09-05 | End: 2019-10-04

## 2019-09-05 ASSESSMENT — PATIENT HEALTH QUESTIONNAIRE - PHQ9: SUM OF ALL RESPONSES TO PHQ QUESTIONS 1-9: 3

## 2019-09-06 ENCOUNTER — OFFICE VISIT (OUTPATIENT)
Dept: PULMONOLOGY | Facility: CLINIC | Age: 74
End: 2019-09-06
Attending: INTERNAL MEDICINE
Payer: COMMERCIAL

## 2019-09-06 VITALS
HEIGHT: 65 IN | HEART RATE: 75 BPM | BODY MASS INDEX: 21.49 KG/M2 | DIASTOLIC BLOOD PRESSURE: 64 MMHG | WEIGHT: 129 LBS | SYSTOLIC BLOOD PRESSURE: 120 MMHG | OXYGEN SATURATION: 95 %

## 2019-09-06 DIAGNOSIS — J43.9 PULMONARY EMPHYSEMA, UNSPECIFIED EMPHYSEMA TYPE (H): ICD-10-CM

## 2019-09-06 DIAGNOSIS — J42 CHRONIC BRONCHITIS, UNSPECIFIED CHRONIC BRONCHITIS TYPE (H): Primary | ICD-10-CM

## 2019-09-06 DIAGNOSIS — J44.9 CHRONIC OBSTRUCTIVE PULMONARY DISEASE, UNSPECIFIED COPD TYPE (H): Primary | ICD-10-CM

## 2019-09-06 PROCEDURE — G0463 HOSPITAL OUTPT CLINIC VISIT: HCPCS

## 2019-09-06 RX ORDER — ALBUTEROL SULFATE 90 UG/1
2 AEROSOL, METERED RESPIRATORY (INHALATION) EVERY 4 HOURS PRN
Qty: 1 INHALER | Refills: 12 | Status: SHIPPED | OUTPATIENT
Start: 2019-09-06 | End: 2019-11-20

## 2019-09-06 RX ORDER — TIOTROPIUM BROMIDE 18 UG/1
18 CAPSULE ORAL; RESPIRATORY (INHALATION) DAILY
Qty: 30 CAPSULE | Refills: 11 | Status: ON HOLD | OUTPATIENT
Start: 2019-09-06 | End: 2020-02-28

## 2019-09-06 ASSESSMENT — MIFFLIN-ST. JEOR: SCORE: 1091.02

## 2019-09-06 ASSESSMENT — PAIN SCALES - GENERAL: PAINLEVEL: NO PAIN (0)

## 2019-09-06 NOTE — NURSING NOTE
Chief Complaint   Patient presents with     Follow Up     COPD     Vitals were taken and medications were reconciled.     ROBERT Valencia
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
## 2019-09-06 NOTE — PATIENT INSTRUCTIONS
COPD  Refilled - Advair 250/50 to be used one inhalation twice daily.  Rinse mouth after each use  Added Spiriva once a day in the morning  Albuterol rescue inhaler refilled.    Get flu shot this fall.    Return to clinic in 1 year.  If you are having breathing problems in the meantime, please call our staff (Francisco Guy RN).    Referral to Dermatology to evaluate and treat L hip tender subcutaneous collection/tissue
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
## 2019-09-08 LAB
EXPTIME-PRE: 9.2 SEC
FEF2575-%PRED-PRE: 59 %
FEF2575-PRE: 1.01 L/SEC
FEF2575-PRED: 1.71 L/SEC
FEFMAX-%PRED-PRE: 59 %
FEFMAX-PRE: 3.3 L/SEC
FEFMAX-PRED: 5.57 L/SEC
FEV1-%PRED-PRE: 83 %
FEV1-PRE: 1.71 L
FEV1FEV6-PRE: 68 %
FEV1FEV6-PRED: 79 %
FEV1FVC-PRE: 68 %
FEV1FVC-PRED: 78 %
FIFMAX-PRE: 2.71 L/SEC
FVC-%PRED-PRE: 96 %
FVC-PRE: 2.53 L
FVC-PRED: 2.62 L

## 2019-09-09 ENCOUNTER — PATIENT OUTREACH (OUTPATIENT)
Dept: BEHAVIORAL HEALTH | Facility: CLINIC | Age: 74
End: 2019-09-09

## 2019-09-09 ENCOUNTER — TELEPHONE (OUTPATIENT)
Dept: FAMILY MEDICINE | Facility: CLINIC | Age: 74
End: 2019-09-09

## 2019-09-09 NOTE — PROGRESS NOTES
3rd outreach for the Community Paramedic program.  Left message for a call back if interested, no further attempts will be made at this time.    Zakiya WITT, NRP  Community Paramedic  Phone number [phone]  Email [email or web address]

## 2019-09-09 NOTE — TELEPHONE ENCOUNTER
Reason for Call:  Form, our goal is to have forms completed with 72 hours, however, some forms may require a visit or additional information.    Type of letter, form or note:  medical    Who is the form from?: Patient    Where did the form come from: Patient or family brought in       What [facility] the [facility]    Where the form was placed: [employer] Box/Folder    What number is listed as a contact on the form?: Fax: [phone]. Call patient when forms have been completed and faxed. Save a copy for patient to .        Additional comments: Anytime     Call taken on 9/9/2019 at 3:08 PM by Cady Estevez

## 2019-09-11 ENCOUNTER — PRE VISIT (OUTPATIENT)
Dept: NEPHROLOGY | Facility: CLINIC | Age: 74
End: 2019-09-11

## 2019-09-12 NOTE — TELEPHONE ENCOUNTER
Faxed completed forms to [facility] [phone] , copy placed at  for patient pick-up & sent to abstract for scanning.    Carola Salamanca, CMA

## 2019-09-16 ENCOUNTER — ANCILLARY PROCEDURE (OUTPATIENT)
Dept: ULTRASOUND IMAGING | Facility: CLINIC | Age: 74
End: 2019-09-16
Attending: CLINICAL NURSE SPECIALIST
Payer: COMMERCIAL

## 2019-09-16 ENCOUNTER — OFFICE VISIT (OUTPATIENT)
Dept: TRANSPLANT | Facility: CLINIC | Age: 74
End: 2019-09-16
Attending: SURGERY
Payer: COMMERCIAL

## 2019-09-16 VITALS
HEIGHT: 65 IN | SYSTOLIC BLOOD PRESSURE: 181 MMHG | DIASTOLIC BLOOD PRESSURE: 70 MMHG | HEART RATE: 57 BPM | BODY MASS INDEX: 21.14 KG/M2 | WEIGHT: 126.9 LBS | OXYGEN SATURATION: 96 %

## 2019-09-16 DIAGNOSIS — N18.5 CHRONIC KIDNEY DISEASE, STAGE 5, KIDNEY FAILURE (H): ICD-10-CM

## 2019-09-16 DIAGNOSIS — Z45.2 ADJUSTMENT AND MANAGEMENT OF VASCULAR ACCESS DEVICE: ICD-10-CM

## 2019-09-16 DIAGNOSIS — Z76.82 ORGAN TRANSPLANT CANDIDATE: Primary | ICD-10-CM

## 2019-09-16 PROCEDURE — G0463 HOSPITAL OUTPT CLINIC VISIT: HCPCS | Mod: ZF

## 2019-09-16 ASSESSMENT — MIFFLIN-ST. JEOR: SCORE: 1076.49

## 2019-09-16 NOTE — LETTER
"9/16/2019      RE: Kim Anne  [address] [facility] [address]       Chief Complaint   Patient presents with     Consult     Fistula      Blood pressure (!) 181/70, pulse 57, height 1.651 m (5' 5\"), weight 57.6 kg (126 lb 14.4 oz), SpO2 96 %, not currently breastfeeding.    Nicky Salamanca [facility]      Transplant Surgery - Clinic Consult Note  09/16/2019    Assessment & Recommendations: Kim Anne is a left handed 74 year old female who donated her left kidney to there brother in 1988. She also has a complex past medical history including hypertension, diabetes mellitus, COPD, SVT, dyslipidemia and multiple abdominal operations. She has had biopsy proven diabetic nephropathy from 02/2015 resulting in chronic kidney failure. She has been on hemodialysis starting 5/2/19 using a tunneled line. She presents today in clinic for discussion about hemodialysis access. On exam, she has slow capillary refill on her right hand and muscle wasting placing her at increased risk for steal syndrome should we proceed with hemodialysis access. She is on the transplant list and is not active. Given the morbidity and significant risk associated with hemodialysis access placement, the patient is opted to forego hemodialysis access placement. We feel this is reasonable to wait for a kidney transplant at this time.     -Will get in touch with the patient's Transplant coordinator to work on activating the patients transplant candidacy    JILLIAN/Fellow/Resident Provider: Massimo Lamb MD    I have reviewed history, examined patient and discussed plan with the fellow/resident/JILLIAN.  I concur with the findings in this note.       Risks of the surgical procedure including but not limited to the rare risk of mortality discussed in detail. Patient verbalized good understanding and had several pertinent questions which were answered satisfactorily.     Immunosuppressive regimen, management and long term risks discussed in " "detail.\    Total time: 25 min  Counseling time: 15 min          Faculty: Susana Allen M.D.    __________________________________________________________________    HPI:  Kim Anne is a left handed 74 year old female who donated her left kidney to there brother in 1988. She also has a complex past medical history including hypertension, diabetes mellitus, COPD, SVT, dyslipidemia and multiple abdominal operations. She has had biopsy proven diabetic nephropathy from 02/2015 resulting in chronic kidney failure. She has been on hemodialysis starting 5/2/19 using a tunneled line. She presents today in clinic for discussion about hemodialysis access. She has had some issues with non-compliance in the past. She lives at home with her two adult children.     ROS:   A 10-point review of systems was negative except as noted above.    Curent Meds:      Physical Exam:     Admit Weight: 57.6 kg (126 lb 14.4 oz)    Current vitals:   BP (!) 181/70   Pulse 57   Ht 1.651 m (5' 5\")   Wt 57.6 kg (126 lb 14.4 oz)   SpO2 96%   BMI 21.12 kg/m       Vital sign ranges:    Pulse:  [57] 57  BP: (181)/(70) 181/70  SpO2:  [96 %] 96 %  Patient Vitals for the past 24 hrs:   BP Pulse SpO2 Height Weight   09/16/19 1351 (!) 181/70 57 96 % 1.651 m (5' 5\") 57.6 kg (126 lb 14.4 oz)     General Appearance: in no apparent distress.   Skin: normal, warm   Heart: irregular rate and rhythm  Lungs: non-labored breathing on room air  Abdomen: The abdomen is soft, non-tender, non-distended  Extremities: edema: absent. Hand muscle wasting noted, slow capillary refill noted    Data:     Urinalysis  Recent Labs   Lab Test 05/01/19  2258 05/01/19  1320 11/16/18  0907  05/16/18  1030   COLOR Straw  --   --   --  Straw   APPEARANCE Clear  --   --   --  Clear   URINEGLC Negative  --   --   --  150*   URINEBILI Negative  --   --   --  Negative   URINEKETONE Negative  --   --   --  Negative   SG 1.006  --   --   --  1.011   UBLD Negative  --   --   " --  Negative   URINEPH 6.5  --   --   --  7.0   PROTEIN 100*  --   --   --  >499*   NITRITE Negative  --   --   --  Negative   LEUKEST Negative  --   --   --  Negative   RBCU <1  --   --   --  1   WBCU <1  --   --   --  1   UTPG  --  8.84* 10.45*   < > 16.14*    < > = values in this interval not displayed.       Susana Allen MD

## 2019-09-16 NOTE — PROGRESS NOTES
"Chief Complaint   Patient presents with     Consult     Fistula      Blood pressure (!) 181/70, pulse 57, height 1.651 m (5' 5\"), weight 57.6 kg (126 lb 14.4 oz), SpO2 96 %, not currently breastfeeding.    Nicky Salamanca, [facility]    "

## 2019-09-16 NOTE — PROGRESS NOTES
Transplant Surgery - Clinic Consult Note  09/16/2019    Assessment & Recommendations: Kim Anne is a left handed 74 year old female who donated her left kidney to there brother in 1988. She also has a complex past medical history including hypertension, diabetes mellitus, COPD, SVT, dyslipidemia and multiple abdominal operations. She has had biopsy proven diabetic nephropathy from 02/2015 resulting in chronic kidney failure. She has been on hemodialysis starting 5/2/19 using a tunneled line. She presents today in clinic for discussion about hemodialysis access. On exam, she has slow capillary refill on her right hand and muscle wasting placing her at increased risk for steal syndrome should we proceed with hemodialysis access. She is on the transplant list and is not active. Given the morbidity and significant risk associated with hemodialysis access placement, the patient is opted to forego hemodialysis access placement. We feel this is reasonable to wait for a kidney transplant at this time.     -Will get in touch with the patient's Transplant coordinator to work on activating the patients transplant candidacy    JILLIAN/Fellow/Resident Provider: Massimo Lamb MD    I have reviewed history, examined patient and discussed plan with the fellow/resident/JILLAIN.  I concur with the findings in this note.       Risks of the surgical procedure including but not limited to the rare risk of mortality discussed in detail. Patient verbalized good understanding and had several pertinent questions which were answered satisfactorily.     Immunosuppressive regimen, management and long term risks discussed in detail.\    Total time: 25 min  Counseling time: 15 min          Faculty: Susana Allen M.D.    __________________________________________________________________    HPI:  Kim Anne is a left handed 74 year old female who donated her left kidney to there brother in 1988. She also has a complex past  "medical history including hypertension, diabetes mellitus, COPD, SVT, dyslipidemia and multiple abdominal operations. She has had biopsy proven diabetic nephropathy from 02/2015 resulting in chronic kidney failure. She has been on hemodialysis starting 5/2/19 using a tunneled line. She presents today in clinic for discussion about hemodialysis access. She has had some issues with non-compliance in the past. She lives at home with her two adult children.     ROS:   A 10-point review of systems was negative except as noted above.    Curent Meds:      Physical Exam:     Admit Weight: 57.6 kg (126 lb 14.4 oz)    Current vitals:   BP (!) 181/70   Pulse 57   Ht 1.651 m (5' 5\")   Wt 57.6 kg (126 lb 14.4 oz)   SpO2 96%   BMI 21.12 kg/m      Vital sign ranges:    Pulse:  [57] 57  BP: (181)/(70) 181/70  SpO2:  [96 %] 96 %  Patient Vitals for the past 24 hrs:   BP Pulse SpO2 Height Weight   09/16/19 1351 (!) 181/70 57 96 % 1.651 m (5' 5\") 57.6 kg (126 lb 14.4 oz)     General Appearance: in no apparent distress.   Skin: normal, warm   Heart: irregular rate and rhythm  Lungs: non-labored breathing on room air  Abdomen: The abdomen is soft, non-tender, non-distended  Extremities: edema: absent. Hand muscle wasting noted, slow capillary refill noted    Data:     Urinalysis  Recent Labs   Lab Test 05/01/19  2258 05/01/19  1320 11/16/18  0907  05/16/18  1030   COLOR Straw  --   --   --  Straw   APPEARANCE Clear  --   --   --  Clear   URINEGLC Negative  --   --   --  150*   URINEBILI Negative  --   --   --  Negative   URINEKETONE Negative  --   --   --  Negative   SG 1.006  --   --   --  1.011   UBLD Negative  --   --   --  Negative   URINEPH 6.5  --   --   --  7.0   PROTEIN 100*  --   --   --  >499*   NITRITE Negative  --   --   --  Negative   LEUKEST Negative  --   --   --  Negative   RBCU <1  --   --   --  1   WBCU <1  --   --   --  1   UTPG  --  8.84* 10.45*   < > 16.14*    < > = values in this interval not displayed.       "

## 2019-09-19 ENCOUNTER — TELEPHONE (OUTPATIENT)
Dept: TRANSPLANT | Facility: CLINIC | Age: 74
End: 2019-09-19

## 2019-09-19 DIAGNOSIS — N18.6 ESRD (END STAGE RENAL DISEASE) (H): ICD-10-CM

## 2019-09-19 DIAGNOSIS — Z76.82 ORGAN TRANSPLANT CANDIDATE: ICD-10-CM

## 2019-10-01 ENCOUNTER — ANCILLARY PROCEDURE (OUTPATIENT)
Dept: GENERAL RADIOLOGY | Facility: CLINIC | Age: 74
End: 2019-10-01
Attending: INTERNAL MEDICINE
Payer: COMMERCIAL

## 2019-10-01 DIAGNOSIS — Z99.2 ESRD (END STAGE RENAL DISEASE) ON DIALYSIS (H): ICD-10-CM

## 2019-10-01 DIAGNOSIS — R76.11 MANTOUX: POSITIVE: ICD-10-CM

## 2019-10-01 DIAGNOSIS — A15.9 TB (TUBERCULOSIS): ICD-10-CM

## 2019-10-01 DIAGNOSIS — N18.6 ESRD (END STAGE RENAL DISEASE) ON DIALYSIS (H): ICD-10-CM

## 2019-10-01 DIAGNOSIS — R76.11 MANTOUX: POSITIVE: Primary | ICD-10-CM

## 2019-10-02 ENCOUNTER — PATIENT OUTREACH (OUTPATIENT)
Dept: GERIATRIC MEDICINE | Facility: CLINIC | Age: 74
End: 2019-10-02

## 2019-10-02 NOTE — PROGRESS NOTES
[facility] Care Coordination Contact    Call from [facility] VELASQUEZ to discuss concerns.     ANA Funes, [facility]   Nephrology Social Worker  [facility]  [address] [place] [address]  T: [phone] F: [phone]

## 2019-10-03 DIAGNOSIS — M54.50 CHRONIC LOW BACK PAIN WITHOUT SCIATICA, UNSPECIFIED BACK PAIN LATERALITY: ICD-10-CM

## 2019-10-03 DIAGNOSIS — G89.29 CHRONIC LOW BACK PAIN WITHOUT SCIATICA, UNSPECIFIED BACK PAIN LATERALITY: ICD-10-CM

## 2019-10-04 RX ORDER — OXYCODONE HYDROCHLORIDE 10 MG/1
5-10 TABLET ORAL EVERY 8 HOURS PRN
Qty: 90 TABLET | Refills: 0 | Status: SHIPPED | OUTPATIENT
Start: 2019-10-04 | End: 2019-10-30

## 2019-10-04 NOTE — TELEPHONE ENCOUNTER
Roxicodone 10 mg      Last Written Prescription Date:  9/5/19  Last Fill Quantity: 90,   # refills: 0  Last Office Visit: 8/27/19  Future Office visit:    Next 5 appointments (look out 90 days)    Oct 07, 2019  9:00 AM CDT  Return Visit with JUNIOR Bishop CNP  [facility] [address]  [address]  [facility] [address]  [phone]   Oct 07, 2019  9:00 AM CDT  Return Visit with LAMINE Rodriguez  [facility] ([facility]) [address]  [address]  [facility] [address]  [phone]           Routing refill request to provider for review/approval because:  Drug not on the [facility], [facility] or [facility] refill protocol or controlled substance    Last script in [place]  was written by  PCP and was filled on 9/5/19. Routed to PCP for review. Angelina Hollis RN October 4, 2019 9:41 AM

## 2019-10-05 ENCOUNTER — NURSE TRIAGE (OUTPATIENT)
Dept: NURSING | Facility: CLINIC | Age: 74
End: 2019-10-05

## 2019-10-05 ENCOUNTER — TRANSFERRED RECORDS (OUTPATIENT)
Dept: HEALTH INFORMATION MANAGEMENT | Facility: CLINIC | Age: 74
End: 2019-10-05

## 2019-10-05 NOTE — TELEPHONE ENCOUNTER
Debbi RIVERA is calling from [facility] in [facility] with questions on location for Dialysis.

## 2019-10-07 ENCOUNTER — PATIENT OUTREACH (OUTPATIENT)
Dept: GERIATRIC MEDICINE | Facility: CLINIC | Age: 74
End: 2019-10-07
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
## 2019-10-09 ENCOUNTER — TELEPHONE (OUTPATIENT)
Dept: DERMATOLOGY | Facility: CLINIC | Age: 74
End: 2019-10-09

## 2019-10-21 ENCOUNTER — TELEPHONE (OUTPATIENT)
Dept: RESPIRATORY THERAPY | Facility: CLINIC | Age: 74
End: 2019-10-21

## 2019-10-21 NOTE — TELEPHONE ENCOUNTER
Attempted to contact patient today for ongoing COPD education. There was no answer and no capability to leave a message. This is the second attempt with no answering machine available. Will file her case.     JAME Xiong, RRT, CTTS  Chronic Pulmonary Disease Specialist  Office: [phone]   Pager: [phone]

## 2019-10-29 DIAGNOSIS — N18.6 ESRD ON DIALYSIS (H): Primary | ICD-10-CM

## 2019-10-29 DIAGNOSIS — Z99.2 ESRD ON DIALYSIS (H): Primary | ICD-10-CM

## 2019-10-29 DIAGNOSIS — Z01.818 PRE-OP EVALUATION: ICD-10-CM

## 2019-10-30 ENCOUNTER — COMMITTEE REVIEW (OUTPATIENT)
Dept: TRANSPLANT | Facility: CLINIC | Age: 74
End: 2019-10-30

## 2019-10-30 NOTE — COMMITTEE REVIEW
Abdominal Committee Review Note     Evaluation Date: 10/25/2017  Committee Review Date: 10/30/2019    Organ being evaluated for: Kidney    Transplant Phase: Waitlist  Transplant Status: Inactive    Transplant Coordinator: Aubrie Lin  Transplant Surgeon:       Referring Physician: Eris Villela    Primary Diagnosis: Diabetes Mellitus - Type II  Secondary Diagnosis:     Committee Review Members:  Nephrology Efrain Chinchilla MD, Matthias Olson, APRN CNP   Nutrition Vania Adams, RD   Pharmacy [facility]    - Clinical Marilee Lora, [facility], Gricelda Galindo [facility]   Transplant Temo Cifuentes MD, Martha Schroeder, NP, Debbi Willis, RN, Carlota Beckwith, RN, Robina Lugo, NICOLE, Lucrecia Shannon RN   Transplant Surgery [facility], Temo Cifuentes MD       Transplant Eligibility: Irreversible chronic kidney disease treated w/dialysis or expected need for dialysis    Committee Review Decision: Remove    Relative Contraindications: None    Absolute Contraindications: Active/ongoing non-compliance and/or failure to provide medical information , Other, non compliance w/dialysis: Positive tox for oxycodone    Committee Chair Temo Cifuentes MD verbally attested to the committee's decision.    Committee Discussion Details: Reviewed history of non compliance refusing dialysis, missing appointments and skipping dialysis.   Reviewed messages with referring nephrologist to ascertain candidacy and compliance.   Reviewed ED note from 10/6/2019 with positive tox screen for marijuana and oxycodone.   Decision make to remove: Coordinator to message referring nephrologist prior to removing from [facility] to update.

## 2019-10-31 ENCOUNTER — DOCUMENTATION ONLY (OUTPATIENT)
Dept: TRANSPLANT | Facility: CLINIC | Age: 74
End: 2019-10-31

## 2019-11-06 ENCOUNTER — DOCUMENTATION ONLY (OUTPATIENT)
Dept: TRANSPLANT | Facility: CLINIC | Age: 74
End: 2019-11-06

## 2019-11-14 ENCOUNTER — PREP FOR PROCEDURE (OUTPATIENT)
Dept: TRANSPLANT | Facility: CLINIC | Age: 74
End: 2019-11-14

## 2019-11-14 DIAGNOSIS — N18.6 ENCOUNTER REGARDING VASCULAR ACCESS FOR DIALYSIS FOR ESRD (H): ICD-10-CM

## 2019-11-14 DIAGNOSIS — Z99.2 ENCOUNTER REGARDING VASCULAR ACCESS FOR DIALYSIS FOR ESRD (H): ICD-10-CM

## 2019-11-14 DIAGNOSIS — Z99.2 ESRD ON DIALYSIS (H): Primary | ICD-10-CM

## 2019-11-14 DIAGNOSIS — N18.6 ESRD ON DIALYSIS (H): Primary | ICD-10-CM

## 2019-11-15 ENCOUNTER — TELEPHONE (OUTPATIENT)
Dept: FAMILY MEDICINE | Facility: CLINIC | Age: 74
End: 2019-11-15

## 2019-11-15 ENCOUNTER — TELEPHONE (OUTPATIENT)
Dept: TRANSPLANT | Facility: CLINIC | Age: 74
End: 2019-11-15

## 2019-11-15 NOTE — TELEPHONE ENCOUNTER
Letters in Epic on 10/31/19 detailing the reasons that the transplant team is electing to remove her from the kidney transplant list and a letter dated 11/6/19 from this team asking her to call to schedule a visit.    Spoke with the patient and recommended she call the number provided on the 11/6/19 letter; she voiced understanding and agreed to plan of care. Angelina Hollis RN November 15, 2019 4:51 PM
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
## 2019-11-15 NOTE — TELEPHONE ENCOUNTER
Reason for Call:  Clarification    Detailed comments: Patient had called stating that she had gotten a letter in the mail saying that she is no longer currently a dialysis patient and also that she is no longer on the kidney transplant wait list. Patient does not understand why she had received this letter and would like to be put back on the wait list and dialysis.  Phone Number Patient can be reached at: Cell number on file:    Telephone Information:   Mobile [phone]       Best Time: Anytime    Can we leave a detailed message on this number? Not Applicable - Patient states she does not have a VM    Call taken on 11/15/2019 at 2:00 PM by Nica Bonner

## 2019-11-19 ENCOUNTER — DOCUMENTATION ONLY (OUTPATIENT)
Dept: TRANSPLANT | Facility: CLINIC | Age: 74
End: 2019-11-19

## 2019-11-19 ENCOUNTER — PRE VISIT (OUTPATIENT)
Dept: SURGERY | Facility: CLINIC | Age: 74
End: 2019-11-19

## 2019-11-19 ENCOUNTER — PATIENT OUTREACH (OUTPATIENT)
Dept: GERIATRIC MEDICINE | Facility: CLINIC | Age: 74
End: 2019-11-19

## 2019-11-19 DIAGNOSIS — Z99.2 ESRD ON DIALYSIS (H): Primary | ICD-10-CM

## 2019-11-19 DIAGNOSIS — N18.6 ENCOUNTER REGARDING VASCULAR ACCESS FOR DIALYSIS FOR END-STAGE RENAL DISEASE (H): ICD-10-CM

## 2019-11-19 DIAGNOSIS — Z99.2 ENCOUNTER REGARDING VASCULAR ACCESS FOR DIALYSIS FOR END-STAGE RENAL DISEASE (H): ICD-10-CM

## 2019-11-19 DIAGNOSIS — Z45.2 ENCOUNTER FOR ADJUSTMENT AND MANAGEMENT OF VASCULAR ACCESS DEVICE: ICD-10-CM

## 2019-11-19 DIAGNOSIS — N18.6 ESRD ON DIALYSIS (H): Primary | ICD-10-CM

## 2019-11-19 NOTE — PROGRESS NOTES
Rasheeda Liz MD Linke, Camilla D, RN; Edyta Keita APRN CNS Cc: Hafsa Saleem LPN      I think her compliance is fair, but cognition may need careful evaluation   thanks    Previous Messages      ----- Message -----   From: Aubrie Lin, RN   Sent: 9/19/2019  10:02 AM CDT   To: Hafsa Saleem LPN, Rasheeda Liz MD, *     Rasheeda, If you feel she is compliant at this time, please let me know and I will have her come in to see the transplant team for active status consideration.   Rissa        10/24/2019  Rasheeda Liz MD Kao, Yeewan S, APRN CNS      Hi   Patient needs to be scheduled for access surgery. She is not reliable, let me and dialysis unit when it is scheduled to remind her   thanks      10/29/2019  Edyta Keita APRN CNS  Rasheeda Liz MD Mirna,   Discussed with Dr. Allen yesterday, he agrees to schedule RUE AV graft surgery. We'll contact her dialysis center for pre op and surgery date/time.   Thanks   Sum

## 2019-11-19 NOTE — PROGRESS NOTES
[facility] Care Coordination Contact    CM placed call to Pratibha ENG at [facility] dialysis clinic, [phone]. To update that this CM was not able to reach member.  VELASQUEZ plans to provide member with CM contact so that she can reach out to CM.   Batool Mai RN, BSN, PHN  [facility]  [phone]  Fax: [phone]

## 2019-11-19 NOTE — PROGRESS NOTES
[facility] Care Coordination Contact    Call from [facility] YANIQUE,  who this CM has spoken with in the past.  Voicemail from [facility] states that member may be interested in services.  CC is requesting this CM call member to discuss. CC provided contact for member that is listed in epic.   Call placed to member to follow up.  No answer. Unable to leave voicemail.  Batool Mai RN, BSN, PHN  [facility]  [phone]  Fax: [phone]

## 2019-11-19 NOTE — PROGRESS NOTES
Edyta Keita APRN CNS  Maggie Gatica; Yuliet Vyas,   I faxed appts and called her dialysis center this morning, they will give her instructions for pre/post op and surgery date/time.   Thanks   Sum    Previous Messages      ----- Message -----   From: Maggie Gatica   Sent: 11/18/2019  12:45 PM CST   To: Edyta Adorno APRN CNS   Subject: RE: Vascular access surgery                       It is scheduled   1.  PAC 11.20.19 at 9:30   2.  Surgery 11.22.19 9:45 procedure time   3.  post op with Sum for 12.06.19 at 1:00     I tried to call her but no answer

## 2019-11-19 NOTE — TELEPHONE ENCOUNTER
FUTURE VISIT INFORMATION      SURGERY INFORMATION:    Date: 11/22/19    Location: [facility] OR    Surgeon:  Susana Allen    Anesthesia Type:  Combined General with Block    RECORDS REQUESTED FROM:       Primary Care Provider: Ailyn Mejia    Pertinent Medical History: COPD, peripheral vascular disease, bradycardia, hypertension,atrial tachycardia    Most recent EKG+ Tracing: 10/7/19- [facility]- requested tracing    Most recent ECHO: 5/20/19    Most recent Cardiac Stress Test: 3/2/18    Most recent Coronary Angiogram: 5/20/19    Most recent PFT's: 9/6/19

## 2019-11-20 ENCOUNTER — OFFICE VISIT (OUTPATIENT)
Dept: SURGERY | Facility: CLINIC | Age: 74
End: 2019-11-20
Attending: CLINICAL NURSE SPECIALIST
Payer: COMMERCIAL

## 2019-11-20 ENCOUNTER — ANESTHESIA EVENT (OUTPATIENT)
Dept: SURGERY | Facility: CLINIC | Age: 74
DRG: 193 | End: 2019-11-20
Payer: COMMERCIAL

## 2019-11-20 VITALS
TEMPERATURE: 99.1 F | HEIGHT: 65 IN | DIASTOLIC BLOOD PRESSURE: 58 MMHG | HEART RATE: 87 BPM | RESPIRATION RATE: 20 BRPM | BODY MASS INDEX: 21.77 KG/M2 | OXYGEN SATURATION: 95 % | SYSTOLIC BLOOD PRESSURE: 118 MMHG | WEIGHT: 130.7 LBS

## 2019-11-20 DIAGNOSIS — N18.6 ESRD (END STAGE RENAL DISEASE) ON DIALYSIS (H): ICD-10-CM

## 2019-11-20 DIAGNOSIS — Z01.818 PRE-OP EXAMINATION: Primary | ICD-10-CM

## 2019-11-20 DIAGNOSIS — Z99.2 ESRD (END STAGE RENAL DISEASE) ON DIALYSIS (H): ICD-10-CM

## 2019-11-20 LAB
ANION GAP SERPL CALCULATED.3IONS-SCNC: 9 MMOL/L (ref 3–14)
BASOPHILS # BLD AUTO: 0.1 10E9/L (ref 0–0.2)
BASOPHILS NFR BLD AUTO: 0.8 %
BUN SERPL-MCNC: 35 MG/DL (ref 7–30)
CALCIUM SERPL-MCNC: 9.2 MG/DL (ref 8.5–10.1)
CHLORIDE SERPL-SCNC: 99 MMOL/L (ref 94–109)
CO2 SERPL-SCNC: 27 MMOL/L (ref 20–32)
CREAT SERPL-MCNC: 5.69 MG/DL (ref 0.52–1.04)
DIFFERENTIAL METHOD BLD: ABNORMAL
EOSINOPHIL # BLD AUTO: 0.2 10E9/L (ref 0–0.7)
EOSINOPHIL NFR BLD AUTO: 1.8 %
ERYTHROCYTE [DISTWIDTH] IN BLOOD BY AUTOMATED COUNT: 18.5 % (ref 10–15)
GFR SERPL CREATININE-BSD FRML MDRD: 7 ML/MIN/{1.73_M2}
GLUCOSE SERPL-MCNC: 160 MG/DL (ref 70–99)
HCT VFR BLD AUTO: 42.2 % (ref 35–47)
HGB BLD-MCNC: 13.5 G/DL (ref 11.7–15.7)
IMM GRANULOCYTES # BLD: 0.1 10E9/L (ref 0–0.4)
IMM GRANULOCYTES NFR BLD: 1.1 %
LYMPHOCYTES # BLD AUTO: 1.7 10E9/L (ref 0.8–5.3)
LYMPHOCYTES NFR BLD AUTO: 16.8 %
MCH RBC QN AUTO: 30.4 PG (ref 26.5–33)
MCHC RBC AUTO-ENTMCNC: 32 G/DL (ref 31.5–36.5)
MCV RBC AUTO: 95 FL (ref 78–100)
MONOCYTES # BLD AUTO: 0.5 10E9/L (ref 0–1.3)
MONOCYTES NFR BLD AUTO: 4.5 %
NEUTROPHILS # BLD AUTO: 7.5 10E9/L (ref 1.6–8.3)
NEUTROPHILS NFR BLD AUTO: 75 %
NRBC # BLD AUTO: 0 10*3/UL
NRBC BLD AUTO-RTO: 0 /100
PLATELET # BLD AUTO: 259 10E9/L (ref 150–450)
POTASSIUM SERPL-SCNC: 4.5 MMOL/L (ref 3.4–5.3)
RBC # BLD AUTO: 4.44 10E12/L (ref 3.8–5.2)
SODIUM SERPL-SCNC: 135 MMOL/L (ref 133–144)
WBC # BLD AUTO: 10 10E9/L (ref 4–11)

## 2019-11-20 ASSESSMENT — MIFFLIN-ST. JEOR: SCORE: 1093.73

## 2019-11-20 ASSESSMENT — LIFESTYLE VARIABLES: TOBACCO_USE: 1

## 2019-11-20 ASSESSMENT — COPD QUESTIONNAIRES
COPD: 1
CAT_SEVERITY: MODERATE

## 2019-11-20 ASSESSMENT — PAIN SCALES - GENERAL: PAINLEVEL: EXTREME PAIN (8)

## 2019-11-20 NOTE — PATIENT INSTRUCTIONS
Preparing for Your Surgery      Name:  Kim Anne   MRN:  [id number]   :  1945   Today's Date:  2019     Arriving for surgery:  Surgery date:   2019  Arrival time:  7:15 am  Please come to:       [facility] [address]  [address] [place]  [address]    - ? parking is available in front of the hospital      -    Please proceed to [address] on the 3rd floor. [phone]?     - ?If you are in need of directions, wheelchair or escort please stop at the Information Desk in the lobby.  Inform the information person that you are here for surgery; a wheelchair and escort to [address] will be provided.?     What can I eat or drink?  -  You may have solid food or milk products until 8 hours prior to your surgery.(midnight)  -  You may have water, apple juice or 7up/Sprite until 2 hours prior to your surgery.(until 7:15 am arrival time)    Which medicines can I take?         Stop Aspirin, vitamins and supplements one week prior to surgery.        Hold Ibuprofen for 24 hours and/or Naproxen for 48 hours prior to surgery.     -  Please take these medications the day of surgery:  Synthroid   Carvedilol       How do I prepare myself?  -  Take two showers: one the night before surgery; and one the morning of surgery.         Use Scrubcare or Hibiclens to wash from neck down, leave soap on your skin for up to one minute.  Do not get soap in your eyes or ears.  You may use your own shampoo and conditioner; no other hair products.   -  Do NOT use lotion, powder, deodorant, or antiperspirant the day of your surgery.  -  Do NOT wear any makeup, fingernail polish or jewelry.  - Do not bring your own medications to the hospital, except for inhalers and eye   drops.  -  Bring your ID and insurance card.       -If you are scheduled to go home the Same Day as surgery you must have a responsible adult as a  and to stay with you overnight the first 24 hours after  surgery.     Questions or Concerns:  -Please call the Pre Admission Nursing Office at [phone].         -For questions after surgery please call your surgeons office.
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
## 2019-11-22 ENCOUNTER — ANESTHESIA (OUTPATIENT)
Dept: SURGERY | Facility: CLINIC | Age: 74
DRG: 193 | End: 2019-11-22
Payer: COMMERCIAL

## 2019-11-22 ENCOUNTER — HOSPITAL ENCOUNTER (OUTPATIENT)
Facility: CLINIC | Age: 74
Discharge: HOME | DRG: 193 | End: 2019-11-22
Attending: SURGERY | Admitting: SURGERY
Payer: COMMERCIAL

## 2019-11-22 VITALS
SYSTOLIC BLOOD PRESSURE: 93 MMHG | RESPIRATION RATE: 16 BRPM | DIASTOLIC BLOOD PRESSURE: 70 MMHG | BODY MASS INDEX: 21.63 KG/M2 | WEIGHT: 129.85 LBS | OXYGEN SATURATION: 96 % | HEIGHT: 65 IN | TEMPERATURE: 97.4 F | HEART RATE: 58 BPM

## 2019-11-22 DIAGNOSIS — N18.6 ESRD ON DIALYSIS (H): ICD-10-CM

## 2019-11-22 DIAGNOSIS — Z99.2 ESRD ON DIALYSIS (H): ICD-10-CM

## 2019-11-22 DIAGNOSIS — N18.6 ENCOUNTER REGARDING VASCULAR ACCESS FOR DIALYSIS FOR ESRD (H): ICD-10-CM

## 2019-11-22 DIAGNOSIS — Z99.2 ENCOUNTER REGARDING VASCULAR ACCESS FOR DIALYSIS FOR ESRD (H): ICD-10-CM

## 2019-11-22 DIAGNOSIS — Z45.2 ENCOUNTER FOR ADJUSTMENT AND MANAGEMENT OF VASCULAR ACCESS DEVICE: ICD-10-CM

## 2019-11-22 DIAGNOSIS — Z99.2 ENCOUNTER REGARDING VASCULAR ACCESS FOR DIALYSIS FOR END-STAGE RENAL DISEASE (H): ICD-10-CM

## 2019-11-22 DIAGNOSIS — N18.6 ENCOUNTER REGARDING VASCULAR ACCESS FOR DIALYSIS FOR END-STAGE RENAL DISEASE (H): ICD-10-CM

## 2019-11-22 LAB
APTT PPP: 35 SEC (ref 22–37)
BASOPHILS # BLD AUTO: 0.1 10E9/L (ref 0–0.2)
BASOPHILS NFR BLD AUTO: 0.7 %
CREAT SERPL-MCNC: 5.19 MG/DL (ref 0.52–1.04)
DIFFERENTIAL METHOD BLD: ABNORMAL
EOSINOPHIL # BLD AUTO: 0.2 10E9/L (ref 0–0.7)
EOSINOPHIL NFR BLD AUTO: 1.8 %
ERYTHROCYTE [DISTWIDTH] IN BLOOD BY AUTOMATED COUNT: 18.7 % (ref 10–15)
GFR SERPL CREATININE-BSD FRML MDRD: 8 ML/MIN/{1.73_M2}
GLUCOSE BLDC GLUCOMTR-MCNC: 104 MG/DL (ref 70–99)
GLUCOSE BLDC GLUCOMTR-MCNC: 136 MG/DL (ref 70–99)
GLUCOSE SERPL-MCNC: 119 MG/DL (ref 70–99)
HCT VFR BLD AUTO: 40.1 % (ref 35–47)
HGB BLD-MCNC: 12.7 G/DL (ref 11.7–15.7)
IMM GRANULOCYTES # BLD: 0.1 10E9/L (ref 0–0.4)
IMM GRANULOCYTES NFR BLD: 1.2 %
INR PPP: 0.97 (ref 0.86–1.14)
LYMPHOCYTES # BLD AUTO: 1.2 10E9/L (ref 0.8–5.3)
LYMPHOCYTES NFR BLD AUTO: 11.6 %
MCH RBC QN AUTO: 30.1 PG (ref 26.5–33)
MCHC RBC AUTO-ENTMCNC: 31.7 G/DL (ref 31.5–36.5)
MCV RBC AUTO: 95 FL (ref 78–100)
MONOCYTES # BLD AUTO: 0.5 10E9/L (ref 0–1.3)
MONOCYTES NFR BLD AUTO: 4.7 %
NEUTROPHILS # BLD AUTO: 8.2 10E9/L (ref 1.6–8.3)
NEUTROPHILS NFR BLD AUTO: 80 %
NRBC # BLD AUTO: 0 10*3/UL
NRBC BLD AUTO-RTO: 0 /100
PLATELET # BLD AUTO: 242 10E9/L (ref 150–450)
POTASSIUM SERPL-SCNC: 4.4 MMOL/L (ref 3.4–5.3)
RBC # BLD AUTO: 4.22 10E12/L (ref 3.8–5.2)
WBC # BLD AUTO: 10.2 10E9/L (ref 4–11)

## 2019-11-22 PROCEDURE — 27210794 ZZH OR GENERAL SUPPLY STERILE: Performed by: SURGERY

## 2019-11-22 PROCEDURE — 25000128 H RX IP 250 OP 636: Performed by: STUDENT IN AN ORGANIZED HEALTH CARE EDUCATION/TRAINING PROGRAM

## 2019-11-22 PROCEDURE — 85025 COMPLETE CBC W/AUTO DIFF WBC: CPT | Performed by: CLINICAL NURSE SPECIALIST

## 2019-11-22 PROCEDURE — 82947 ASSAY GLUCOSE BLOOD QUANT: CPT | Performed by: CLINICAL NURSE SPECIALIST

## 2019-11-22 PROCEDURE — 85610 PROTHROMBIN TIME: CPT | Performed by: CLINICAL NURSE SPECIALIST

## 2019-11-22 PROCEDURE — 25000128 H RX IP 250 OP 636: Performed by: NURSE ANESTHETIST, CERTIFIED REGISTERED

## 2019-11-22 PROCEDURE — 82565 ASSAY OF CREATININE: CPT | Performed by: CLINICAL NURSE SPECIALIST

## 2019-11-22 PROCEDURE — 36415 COLL VENOUS BLD VENIPUNCTURE: CPT | Performed by: CLINICAL NURSE SPECIALIST

## 2019-11-22 PROCEDURE — 36000057 ZZH SURGERY LEVEL 3 1ST 30 MIN - UMMC: Performed by: SURGERY

## 2019-11-22 PROCEDURE — 25000128 H RX IP 250 OP 636: Performed by: CLINICAL NURSE SPECIALIST

## 2019-11-22 PROCEDURE — 76499 UNLISTED DX RADIOGRAPHIC PX: CPT

## 2019-11-22 PROCEDURE — 84132 ASSAY OF SERUM POTASSIUM: CPT | Performed by: CLINICAL NURSE SPECIALIST

## 2019-11-22 PROCEDURE — 25000132 ZZH RX MED GY IP 250 OP 250 PS 637: Performed by: ANESTHESIOLOGY

## 2019-11-22 PROCEDURE — 37000009 ZZH ANESTHESIA TECHNICAL FEE, EACH ADDTL 15 MIN: Performed by: SURGERY

## 2019-11-22 PROCEDURE — 76998 US GUIDE INTRAOP: CPT

## 2019-11-22 PROCEDURE — 36000059 ZZH SURGERY LEVEL 3 EA 15 ADDTL MIN UMMC: Performed by: SURGERY

## 2019-11-22 PROCEDURE — 71000027 ZZH RECOVERY PHASE 2 EACH 15 MINS: Performed by: SURGERY

## 2019-11-22 PROCEDURE — 37000008 ZZH ANESTHESIA TECHNICAL FEE, 1ST 30 MIN: Performed by: SURGERY

## 2019-11-22 PROCEDURE — 25000125 ZZHC RX 250: Performed by: NURSE ANESTHETIST, CERTIFIED REGISTERED

## 2019-11-22 PROCEDURE — 85730 THROMBOPLASTIN TIME PARTIAL: CPT | Performed by: CLINICAL NURSE SPECIALIST

## 2019-11-22 PROCEDURE — 82962 GLUCOSE BLOOD TEST: CPT

## 2019-11-22 PROCEDURE — 25800030 ZZH RX IP 258 OP 636: Performed by: NURSE ANESTHETIST, CERTIFIED REGISTERED

## 2019-11-22 PROCEDURE — 25800030 ZZH RX IP 258 OP 636: Performed by: SURGERY

## 2019-11-22 PROCEDURE — 25000125 ZZHC RX 250: Performed by: ANESTHESIOLOGY

## 2019-11-22 PROCEDURE — 25000128 H RX IP 250 OP 636: Performed by: SURGERY

## 2019-11-22 PROCEDURE — 40000171 ZZH STATISTIC PRE-PROCEDURE ASSESSMENT III: Performed by: SURGERY

## 2019-11-22 RX ORDER — LIDOCAINE HYDROCHLORIDE 20 MG/ML
INJECTION, SOLUTION INFILTRATION; PERINEURAL PRN
Status: DISCONTINUED | OUTPATIENT
Start: 2019-11-22 | End: 2019-11-22

## 2019-11-22 RX ORDER — SODIUM CHLORIDE 9 MG/ML
INJECTION, SOLUTION INTRAVENOUS CONTINUOUS PRN
Status: DISCONTINUED | OUTPATIENT
Start: 2019-11-22 | End: 2019-11-22

## 2019-11-22 RX ORDER — ACETAMINOPHEN 325 MG/1
650 TABLET ORAL ONCE
Status: COMPLETED | OUTPATIENT
Start: 2019-11-22 | End: 2019-11-22

## 2019-11-22 RX ORDER — CALCIUM CHLORIDE 100 MG/ML
INJECTION INTRAVENOUS; INTRAVENTRICULAR PRN
Status: DISCONTINUED | OUTPATIENT
Start: 2019-11-22 | End: 2019-11-22

## 2019-11-22 RX ORDER — NALOXONE HYDROCHLORIDE 0.4 MG/ML
.1-.4 INJECTION, SOLUTION INTRAMUSCULAR; INTRAVENOUS; SUBCUTANEOUS
Status: DISCONTINUED | OUTPATIENT
Start: 2019-11-22 | End: 2019-11-22 | Stop reason: HOSPADM

## 2019-11-22 RX ORDER — FENTANYL CITRATE 50 UG/ML
INJECTION, SOLUTION INTRAMUSCULAR; INTRAVENOUS PRN
Status: DISCONTINUED | OUTPATIENT
Start: 2019-11-22 | End: 2019-11-22

## 2019-11-22 RX ORDER — TRAMADOL HYDROCHLORIDE 50 MG/1
50 TABLET ORAL EVERY 6 HOURS PRN
Qty: 20 TABLET | Refills: 0 | Status: ON HOLD | OUTPATIENT
Start: 2019-11-22 | End: 2019-11-27

## 2019-11-22 RX ORDER — ONDANSETRON 2 MG/ML
INJECTION INTRAMUSCULAR; INTRAVENOUS PRN
Status: DISCONTINUED | OUTPATIENT
Start: 2019-11-22 | End: 2019-11-22

## 2019-11-22 RX ORDER — FENTANYL CITRATE 50 UG/ML
25-50 INJECTION, SOLUTION INTRAMUSCULAR; INTRAVENOUS
Status: DISCONTINUED | OUTPATIENT
Start: 2019-11-22 | End: 2019-11-22 | Stop reason: HOSPADM

## 2019-11-22 RX ORDER — FLUMAZENIL 0.1 MG/ML
0.2 INJECTION, SOLUTION INTRAVENOUS
Status: DISCONTINUED | OUTPATIENT
Start: 2019-11-22 | End: 2019-11-22 | Stop reason: HOSPADM

## 2019-11-22 RX ORDER — GLYCOPYRROLATE 0.2 MG/ML
INJECTION, SOLUTION INTRAMUSCULAR; INTRAVENOUS PRN
Status: DISCONTINUED | OUTPATIENT
Start: 2019-11-22 | End: 2019-11-22

## 2019-11-22 RX ORDER — EPHEDRINE SULFATE 50 MG/ML
INJECTION, SOLUTION INTRAMUSCULAR; INTRAVENOUS; SUBCUTANEOUS PRN
Status: DISCONTINUED | OUTPATIENT
Start: 2019-11-22 | End: 2019-11-22

## 2019-11-22 RX ORDER — ALBUTEROL SULFATE 0.83 MG/ML
2.5 SOLUTION RESPIRATORY (INHALATION) ONCE
Status: COMPLETED | OUTPATIENT
Start: 2019-11-22 | End: 2019-11-22

## 2019-11-22 RX ORDER — SODIUM CHLORIDE, SODIUM LACTATE, POTASSIUM CHLORIDE, CALCIUM CHLORIDE 600; 310; 30; 20 MG/100ML; MG/100ML; MG/100ML; MG/100ML
INJECTION, SOLUTION INTRAVENOUS CONTINUOUS
Status: DISCONTINUED | OUTPATIENT
Start: 2019-11-22 | End: 2019-11-22 | Stop reason: HOSPADM

## 2019-11-22 RX ORDER — ONDANSETRON 4 MG/1
4 TABLET, ORALLY DISINTEGRATING ORAL EVERY 30 MIN PRN
Status: DISCONTINUED | OUTPATIENT
Start: 2019-11-22 | End: 2019-11-22 | Stop reason: HOSPADM

## 2019-11-22 RX ORDER — BUPIVACAINE HYDROCHLORIDE 5 MG/ML
INJECTION, SOLUTION EPIDURAL; INTRACAUDAL PRN
Status: DISCONTINUED | OUTPATIENT
Start: 2019-11-22 | End: 2019-11-22

## 2019-11-22 RX ORDER — VANCOMYCIN HYDROCHLORIDE 1 G/200ML
1000 INJECTION, SOLUTION INTRAVENOUS
Status: COMPLETED | OUTPATIENT
Start: 2019-11-22 | End: 2019-11-22

## 2019-11-22 RX ORDER — DIMENHYDRINATE 50 MG/ML
25 INJECTION, SOLUTION INTRAMUSCULAR; INTRAVENOUS
Status: DISCONTINUED | OUTPATIENT
Start: 2019-11-22 | End: 2019-11-22 | Stop reason: HOSPADM

## 2019-11-22 RX ORDER — FENTANYL CITRATE 50 UG/ML
25 INJECTION, SOLUTION INTRAMUSCULAR; INTRAVENOUS
Status: DISCONTINUED | OUTPATIENT
Start: 2019-11-22 | End: 2019-11-22 | Stop reason: HOSPADM

## 2019-11-22 RX ORDER — ONDANSETRON 2 MG/ML
4 INJECTION INTRAMUSCULAR; INTRAVENOUS EVERY 30 MIN PRN
Status: DISCONTINUED | OUTPATIENT
Start: 2019-11-22 | End: 2019-11-22 | Stop reason: HOSPADM

## 2019-11-22 RX ORDER — PROPOFOL 10 MG/ML
INJECTION, EMULSION INTRAVENOUS CONTINUOUS PRN
Status: DISCONTINUED | OUTPATIENT
Start: 2019-11-22 | End: 2019-11-22

## 2019-11-22 RX ADMIN — BUPIVACAINE HYDROCHLORIDE 30 ML: 5 INJECTION, SOLUTION EPIDURAL; INTRACAUDAL at 09:45

## 2019-11-22 RX ADMIN — ONDANSETRON 4 MG: 2 INJECTION INTRAMUSCULAR; INTRAVENOUS at 10:41

## 2019-11-22 RX ADMIN — FENTANYL CITRATE 25 MCG: 50 INJECTION, SOLUTION INTRAMUSCULAR; INTRAVENOUS at 12:50

## 2019-11-22 RX ADMIN — PROPOFOL 50 MCG/KG/MIN: 10 INJECTION, EMULSION INTRAVENOUS at 10:56

## 2019-11-22 RX ADMIN — NOREPINEPHRINE BITARTRATE 3.2 MCG: 1 INJECTION INTRAVENOUS at 11:37

## 2019-11-22 RX ADMIN — GLYCOPYRROLATE 0.2 MG: 0.2 INJECTION, SOLUTION INTRAMUSCULAR; INTRAVENOUS at 10:41

## 2019-11-22 RX ADMIN — Medication 10 MG: at 11:29

## 2019-11-22 RX ADMIN — CALCIUM CHLORIDE 0.5 G: 100 INJECTION, SOLUTION INTRAVENOUS at 11:13

## 2019-11-22 RX ADMIN — FENTANYL CITRATE 25 MCG: 50 INJECTION, SOLUTION INTRAMUSCULAR; INTRAVENOUS at 13:05

## 2019-11-22 RX ADMIN — ALBUTEROL SULFATE 2.5 MG: 2.5 SOLUTION RESPIRATORY (INHALATION) at 09:56

## 2019-11-22 RX ADMIN — ACETAMINOPHEN 650 MG: 325 TABLET, FILM COATED ORAL at 09:56

## 2019-11-22 RX ADMIN — Medication 10 MG: at 11:20

## 2019-11-22 RX ADMIN — CALCIUM CHLORIDE 0.5 G: 100 INJECTION, SOLUTION INTRAVENOUS at 11:05

## 2019-11-22 RX ADMIN — GLYCOPYRROLATE 0.2 MG: 0.2 INJECTION, SOLUTION INTRAMUSCULAR; INTRAVENOUS at 11:49

## 2019-11-22 RX ADMIN — LIDOCAINE HYDROCHLORIDE 50 MG: 20 INJECTION, SOLUTION INFILTRATION; PERINEURAL at 10:41

## 2019-11-22 RX ADMIN — Medication 5 MG: at 11:16

## 2019-11-22 RX ADMIN — VANCOMYCIN HYDROCHLORIDE 1 G: 1 INJECTION, SOLUTION INTRAVENOUS at 10:55

## 2019-11-22 RX ADMIN — SODIUM CHLORIDE: 9 INJECTION, SOLUTION INTRAVENOUS at 10:20

## 2019-11-22 ASSESSMENT — MIFFLIN-ST. JEOR: SCORE: 1089.88

## 2019-11-22 NOTE — OR NURSING
Dr Gage was called at 08:35 and notified that blood pressure was 68/45 to 79/49 at start of block with heartrate in low 40s He was also notified that patient has a very frequent productive cough with white and clear phlegm.  Albuterol nebulizer given per order.  Patient had right supraclavicular block. She denies numbness around mouth, ringing in  The ears or metalic taste

## 2019-11-22 NOTE — SIGNIFICANT EVENT
SPIRITUAL HEALTH SERVICES  [facility] ([place] 3C   PRE-SURGERY VISIT    Had pre-surgery visit with pt. Provided spiritual support, prayer.     PATIENT ANOINTED by Father Massimo Jean-Baptiste 11/22/2019     Massimo Jean-Baptiste M.Div.     Pager [phone]

## 2019-11-22 NOTE — ANESTHESIA POSTPROCEDURE EVALUATION
Anesthesia POST Procedure Evaluation    Patient: Kim Anne   MRN:     [id number] Gender:   female   Age:    74 year old :      1945        Preoperative Diagnosis: ESRD on dialysis (H) [N18.6, Z99.2]  Encounter regarding vascular access for dialysis for ESRD (H) [N18.6, Z99.2]   Procedure(s):  CREATION, GRAFT, ARTERIOVENOUS, RIGHT UPPER EXTREMITY   Postop Comments: No value filed.       Anesthesia Type:  Not documented  No value filed.    Reportable Event: NO     PAIN: Uncomplicated   Sign Out status: Comfortable, Well controlled pain     PONV: No PONV   Sign Out status:  No Nausea or Vomiting     Neuro/Psych: Uneventful perioperative course   Sign Out Status: Preoperative baseline; Age appropriate mentation     Airway/Resp.: Uneventful perioperative course        CV: Uneventful perioperative course   Sign Out status: Appropriate BP and perfusion indices; Appropriate HR/Rhythm     Disposition:   Sign Out in:  PACU  Disposition:  Phase II; Home  Recovery Course: Uneventful  Follow-Up: Not required           Last Anesthesia Record Vitals:  CRNA VITALS  2019 1304 - 2019 1404      2019             NIBP:  96/64    Pulse:  69    Temp:  36.4  C (97.5  F)    SpO2:  100 %    Resp Rate (observed):  10    EKG:  Sinus rhythm          Last PACU Vitals:  Vitals Value Taken Time   BP 96/64 2019  1:37 PM   Temp 36.4  C (97.5  F) 2019  1:37 PM   Pulse 70 2019  1:37 PM   Resp 14 2019  1:37 PM   SpO2 100 % 2019  1:37 PM   Temp src     NIBP 96/64 2019  1:38 PM   Pulse 69 2019  1:38 PM   SpO2 100 % 2019  1:38 PM   Resp     Temp 36.4  C (97.5  F) 2019  1:38 PM   Ht Rate 71 2019  1:30 PM   Temp 2 33.9  C (93  F) 2019  1:30 PM         Electronically Signed By: William Brambila MD, 2019, 4:04 PM

## 2019-11-22 NOTE — OR NURSING
Dr Lamb at bedside to obtain consent.  He stated he thought patient would onllyneed one antibiotic.  He stated he would talk to Dr Allen but to just sent the antibiotics to the OR- he stated not to start in the preop

## 2019-11-22 NOTE — ANESTHESIA PROCEDURE NOTES
Peripheral Nerve Block Procedure Note  Date/Time: 11/22/2019 9:45 AM    Staff:     Anesthesiologist:  Severiano Hickey MD    Resident/CRNA:  Gi Jaramillo MD    Block performed by resident/CRNA in the presence of a teaching physician    Location: Pre-op  Procedure Start/Stop TImes:     patient identified, IV checked, risks and benefits discussed, informed consent, monitors and equipment checked, pre-op evaluation, at physician/surgeon's request and post-op pain management      Correct Patient: Yes      Correct Position: Yes      Correct Site: Yes      Correct Procedure: Yes      Correct Laterality:  N/A    Site Marked:  N/A  Procedure details:     Procedure:  Other (R Supraclavicular)    ASA:  3    Laterality:  Right    Position:  Sitting    Sterile Prep: chloraprep, mask and sterile gloves      Local skin infiltration:  None    Needle:  Short bevel    Needle gauge:  21    Needle length (inches):  1    Ultrasound: Yes      Ultrasound used to identify targeted nerve, plexus, or vascular structure and placed a needle adjacent to it      Permanent Image entered into patiient's record      Abnormal pain on injection: No      Blood Aspirated: No      Paresthesias:  No    Bleeding at site: No      Bolus via:  Needle    Infusion Method:  Single Shot    Complications:  None

## 2019-11-22 NOTE — OP NOTE
Preop Dx:  Chronic renal failure.     Postop Dx: Same     Procedure:   1- Right cephalic vein to brachial artery arterio-venous fistula creation  2- Intra-operative ultrasound     Surgeon: Susana Allen MD     Fellow: Massimo Lamb MD Fellow     Resident: Patel Dias MD Fellow     Anesthesia: General.     EBL: 10 ml        Complications: None.     Indication: The patient has Chronic kidney failure and is in need of permanent dialysis access.  After discussing the risks and benefits of proceeding, the patient agreed to proceed with surgery and provided informed consent.      FINDINGS:   Artery quality was: there was good   Outflow vein quality was: Normal       PROCEDURE:   The patient was placed supine on the operating table.The right upper extremity was positioned, prepped and draped in the usual sterile fashion. An ultrasound was performed to assess the size, quality, and position of the artery and vein. An incision was made and extended through the subcutaneous tissues near the right wrist. The brachial artery and cephalic vein in the arm were circumferentially dissected. The patient was not heparinized. Proximal and distal control of the Radial artery was obtained. A arteriotomy was made and the cephalic vein was anastomosed to the brachial artery. The anastomosis was completed using running 7-0 prolene and distended with heparinized saline prior to tying. Hemostasis was obained. . The clamps were removed and the capillary refill was good. These wounds were closed with a subcuticular suture of 4-0 monocryl.  The arterio-venous fistula wound was closed in layers with absorbable suture and dressed with Dermabond. Counts were correct. Faculty was present for the key portions of the procedure. The patient was then awakened and transferred to PACU in good condition.     I was present during the key portions of the procedure, and I was immediately available for the entire procedure.

## 2019-11-22 NOTE — ANESTHESIA CARE TRANSFER NOTE
Patient: Kim Anne    Procedure(s):  CREATION, GRAFT, ARTERIOVENOUS, RIGHT UPPER EXTREMITY    Diagnosis: ESRD on dialysis (H) [N18.6, Z99.2]  Encounter regarding vascular access for dialysis for ESRD (H) [N18.6, Z99.2]  Diagnosis Additional Information: No value filed.    Anesthesia Type:   No value filed.     Note:  Airway :Nasal Cannula  Patient transferred to:Phase II  Comments: Spont resp, VSS, report to RNHandoff Report: Identifed the Patient, Identified the Reponsible Provider, Reviewed the pertinent medical history, Discussed the surgical course, Reviewed Intra-OP anesthesia mangement and issues during anesthesia, Set expectations for post-procedure period and Allowed opportunity for questions and acknowledgement of understanding      Vitals: (Last set prior to Anesthesia Care Transfer)    CRNA VITALS  11/22/2019 1304 - 11/22/2019 1345      11/22/2019             NIBP:  96/64    Pulse:  69    Temp:  36.4  C (97.5  F)    SpO2:  100 %    Resp Rate (observed):  10    EKG:  Sinus rhythm                Electronically Signed By: JUNIOR Calixto CRNA  November 22, 2019  1:45 PM

## 2019-11-22 NOTE — DISCHARGE INSTRUCTIONS
May resum pre-hospital diet immediately.    No lifting >10 pounds for 6 weeks.  No rigorous physical activity.    May shower now. No bathing for two weeks.    Call [phone] to schedule or with questions during the week days.      Call [phone] and ask to speak with surgery resident if you are having troubles in the evenings, at night, or on weekends. Please call if you experience increasing abdominal pain, nausea, vomiting, increasing drainage from your wounds, chills, or fever >101.5    Take stool softener while taking narcotic pain medication.    No driving for at least 12 hours after taking narcotic pain medication.    Future Appointments   Date Time Provider Department Center   12/6/2019  1:00 PM Edyta Keita APRN [facility]   9/11/2020 10:30 AM Serge Funez MD [facility]   12/5/2090  7:30 AM [address]  Same-Day Surgery   Adult Discharge Orders & Instructions     For 24 hours after surgery    1. Get plenty of rest.  A responsible adult must stay with you for at least 24 hours after you leave the hospital.   2. Do not drive or use heavy equipment.  If you have weakness or tingling, don't drive or use heavy equipment until this feeling goes away.  3. Do not drink alcohol.  4. Avoid strenuous or risky activities.  Ask for help when climbing stairs.   5. You may feel lightheaded.  IF so, sit for a few minutes before standing.  Have someone help you get up.   6. If you have nausea (feel sick to your stomach): Drink only clear liquids such as apple juice, ginger ale, broth or 7-Up.  Rest may also help.  Be sure to drink enough fluids.  Move to a regular diet as you feel able.  7. You may have a slight fever. Call the doctor if your fever is over 100 F (37.7 C) (taken under the tongue) or lasts longer than 24 hours.  8. You may have a dry mouth, a sore throat, muscle aches or trouble sleeping.  These should go away after 24  hours.  9. Do not make important or legal decisions.   Call your doctor for any of the followin.  Signs of infection (fever, growing tenderness at the surgery site, a large amount of drainage or bleeding, severe pain, foul-smelling drainage, redness, swelling).    2. It has been over 8 to 10 hours since surgery and you are still not able to urinate (pass water).    3.  Headache for over 24 hours.    To contact a doctor, call Dr Allen's office at [phone] or:        [phone] and ask for the resident on call for   Transplant Surgery (answered 24 hours a day)      Emergency Department:    [facility]: [phone]       (TTY for hearing impaired: [phone])    [facility]: [phone]       (TTY for hearing impaired: [phone])

## 2019-11-23 ENCOUNTER — APPOINTMENT (OUTPATIENT)
Dept: CARDIOLOGY | Facility: CLINIC | Age: 74
DRG: 193 | End: 2019-11-23
Attending: INTERNAL MEDICINE
Payer: COMMERCIAL

## 2019-11-23 ENCOUNTER — HOSPITAL ENCOUNTER (INPATIENT)
Facility: CLINIC | Age: 74
LOS: 4 days | Discharge: HOME HEALTH | DRG: 193 | End: 2019-11-27
Attending: INTERNAL MEDICINE | Admitting: PHYSICIAN ASSISTANT
Payer: COMMERCIAL

## 2019-11-23 ENCOUNTER — APPOINTMENT (OUTPATIENT)
Dept: GENERAL RADIOLOGY | Facility: CLINIC | Age: 74
DRG: 193 | End: 2019-11-23
Attending: INTERNAL MEDICINE
Payer: COMMERCIAL

## 2019-11-23 ENCOUNTER — APPOINTMENT (OUTPATIENT)
Dept: CT IMAGING | Facility: CLINIC | Age: 74
DRG: 193 | End: 2019-11-23
Attending: INTERNAL MEDICINE
Payer: COMMERCIAL

## 2019-11-23 DIAGNOSIS — J18.9 PNEUMONIA OF BOTH LOWER LOBES DUE TO INFECTIOUS ORGANISM: ICD-10-CM

## 2019-11-23 DIAGNOSIS — F17.210 CIGARETTE SMOKER: ICD-10-CM

## 2019-11-23 DIAGNOSIS — I95.9 HYPOTENSION, UNSPECIFIED HYPOTENSION TYPE: ICD-10-CM

## 2019-11-23 DIAGNOSIS — J43.9 PULMONARY EMPHYSEMA, UNSPECIFIED EMPHYSEMA TYPE (H): ICD-10-CM

## 2019-11-23 DIAGNOSIS — N18.6 ESRD (END STAGE RENAL DISEASE) ON DIALYSIS (H): ICD-10-CM

## 2019-11-23 DIAGNOSIS — N18.6 ESRD ON DIALYSIS (H): Primary | ICD-10-CM

## 2019-11-23 DIAGNOSIS — J96.21 ACUTE ON CHRONIC RESPIRATORY FAILURE WITH HYPOXIA (H): ICD-10-CM

## 2019-11-23 DIAGNOSIS — Z99.2 ESRD ON DIALYSIS (H): Primary | ICD-10-CM

## 2019-11-23 DIAGNOSIS — J42 CHRONIC BRONCHITIS, UNSPECIFIED CHRONIC BRONCHITIS TYPE (H): ICD-10-CM

## 2019-11-23 DIAGNOSIS — Z99.2 ESRD (END STAGE RENAL DISEASE) ON DIALYSIS (H): ICD-10-CM

## 2019-11-23 LAB
ABO + RH BLD: NORMAL
ABO + RH BLD: NORMAL
ALBUMIN SERPL-MCNC: 3.2 G/DL (ref 3.4–5)
ALBUMIN UR-MCNC: 300 MG/DL
ALP SERPL-CCNC: 139 U/L (ref 40–150)
ALT SERPL W P-5'-P-CCNC: 25 U/L (ref 0–50)
ANION GAP SERPL CALCULATED.3IONS-SCNC: 7 MMOL/L (ref 3–14)
APPEARANCE UR: CLEAR
AST SERPL W P-5'-P-CCNC: 25 U/L (ref 0–45)
BASOPHILS # BLD AUTO: 0.1 10E9/L (ref 0–0.2)
BASOPHILS NFR BLD AUTO: 0.6 %
BILIRUB SERPL-MCNC: 0.4 MG/DL (ref 0.2–1.3)
BILIRUB UR QL STRIP: NEGATIVE
BLD GP AB SCN SERPL QL: NORMAL
BLOOD BANK CMNT PATIENT-IMP: NORMAL
BUN SERPL-MCNC: 21 MG/DL (ref 7–30)
CALCIUM SERPL-MCNC: 8.7 MG/DL (ref 8.5–10.1)
CHLORIDE SERPL-SCNC: 101 MMOL/L (ref 94–109)
CO2 BLDCOV-SCNC: 26 MMOL/L (ref 21–28)
CO2 SERPL-SCNC: 26 MMOL/L (ref 20–32)
COLOR UR AUTO: YELLOW
CREAT SERPL-MCNC: 3.73 MG/DL (ref 0.52–1.04)
CRP SERPL-MCNC: 8.9 MG/L (ref 0–8)
DIFFERENTIAL METHOD BLD: ABNORMAL
EOSINOPHIL # BLD AUTO: 0.2 10E9/L (ref 0–0.7)
EOSINOPHIL NFR BLD AUTO: 1.3 %
ERYTHROCYTE [DISTWIDTH] IN BLOOD BY AUTOMATED COUNT: 18.7 % (ref 10–15)
ERYTHROCYTE [SEDIMENTATION RATE] IN BLOOD BY WESTERGREN METHOD: 26 MM/H (ref 0–30)
GFR SERPL CREATININE-BSD FRML MDRD: 11 ML/MIN/{1.73_M2}
GLUCOSE BLDC GLUCOMTR-MCNC: 102 MG/DL (ref 70–99)
GLUCOSE BLDC GLUCOMTR-MCNC: 105 MG/DL (ref 70–99)
GLUCOSE BLDC GLUCOMTR-MCNC: 145 MG/DL (ref 70–99)
GLUCOSE SERPL-MCNC: 129 MG/DL (ref 70–99)
GLUCOSE UR STRIP-MCNC: 30 MG/DL
HCT VFR BLD AUTO: 41.5 % (ref 35–47)
HGB BLD-MCNC: 12.9 G/DL (ref 11.7–15.7)
HGB UR QL STRIP: ABNORMAL
IMM GRANULOCYTES # BLD: 0.1 10E9/L (ref 0–0.4)
IMM GRANULOCYTES NFR BLD: 0.7 %
INR PPP: 1.02 (ref 0.86–1.14)
INTERPRETATION ECG - MUSE: NORMAL
KETONES UR STRIP-MCNC: NEGATIVE MG/DL
LACTATE BLD-SCNC: 0.8 MMOL/L (ref 0.7–2.1)
LEUKOCYTE ESTERASE UR QL STRIP: NEGATIVE
LYMPHOCYTES # BLD AUTO: 0.8 10E9/L (ref 0.8–5.3)
LYMPHOCYTES NFR BLD AUTO: 6.6 %
MAGNESIUM SERPL-MCNC: 1.9 MG/DL (ref 1.6–2.3)
MCH RBC QN AUTO: 30 PG (ref 26.5–33)
MCHC RBC AUTO-ENTMCNC: 31.1 G/DL (ref 31.5–36.5)
MCV RBC AUTO: 97 FL (ref 78–100)
MONOCYTES # BLD AUTO: 0.4 10E9/L (ref 0–1.3)
MONOCYTES NFR BLD AUTO: 3.2 %
NEUTROPHILS # BLD AUTO: 10.6 10E9/L (ref 1.6–8.3)
NEUTROPHILS NFR BLD AUTO: 87.6 %
NITRATE UR QL: NEGATIVE
NRBC # BLD AUTO: 0 10*3/UL
NRBC BLD AUTO-RTO: 0 /100
NT-PROBNP SERPL-MCNC: ABNORMAL PG/ML (ref 0–900)
PCO2 BLDV: 49 MM HG (ref 40–50)
PH BLDV: 7.34 PH (ref 7.32–7.43)
PH UR STRIP: 7.5 PH (ref 5–7)
PLATELET # BLD AUTO: 235 10E9/L (ref 150–450)
PO2 BLDV: 48 MM HG (ref 25–47)
POTASSIUM SERPL-SCNC: 3.9 MMOL/L (ref 3.4–5.3)
PROT SERPL-MCNC: 7.4 G/DL (ref 6.8–8.8)
RBC # BLD AUTO: 4.3 10E12/L (ref 3.8–5.2)
RBC #/AREA URNS AUTO: 3 /HPF (ref 0–2)
SAO2 % BLDV FROM PO2: 80 %
SODIUM SERPL-SCNC: 135 MMOL/L (ref 133–144)
SOURCE: ABNORMAL
SP GR UR STRIP: 1.01 (ref 1–1.03)
SPECIMEN EXP DATE BLD: NORMAL
SQUAMOUS #/AREA URNS AUTO: <1 /HPF (ref 0–1)
T4 FREE SERPL-MCNC: 0.54 NG/DL (ref 0.76–1.46)
TRANS CELLS #/AREA URNS HPF: <1 /HPF (ref 0–1)
TROPONIN I SERPL-MCNC: 0.04 UG/L (ref 0–0.04)
TSH SERPL DL<=0.005 MIU/L-ACNC: 83.55 MU/L (ref 0.4–4)
UROBILINOGEN UR STRIP-MCNC: NORMAL MG/DL (ref 0–2)
VANCOMYCIN SERPL-MCNC: 11.9 MG/L
WBC # BLD AUTO: 12.1 10E9/L (ref 4–11)
WBC #/AREA URNS AUTO: 2 /HPF (ref 0–5)

## 2019-11-23 PROCEDURE — 99291 CRITICAL CARE FIRST HOUR: CPT | Mod: GC | Performed by: PHYSICIAN ASSISTANT

## 2019-11-23 PROCEDURE — 25800030 ZZH RX IP 258 OP 636: Performed by: INTERNAL MEDICINE

## 2019-11-23 PROCEDURE — 83605 ASSAY OF LACTIC ACID: CPT

## 2019-11-23 PROCEDURE — 99291 CRITICAL CARE FIRST HOUR: CPT | Mod: 25 | Performed by: INTERNAL MEDICINE

## 2019-11-23 PROCEDURE — 25000128 H RX IP 250 OP 636: Performed by: STUDENT IN AN ORGANIZED HEALTH CARE EDUCATION/TRAINING PROGRAM

## 2019-11-23 PROCEDURE — 86850 RBC ANTIBODY SCREEN: CPT | Performed by: INTERNAL MEDICINE

## 2019-11-23 PROCEDURE — 84439 ASSAY OF FREE THYROXINE: CPT | Performed by: INTERNAL MEDICINE

## 2019-11-23 PROCEDURE — 99285 EMERGENCY DEPT VISIT HI MDM: CPT | Mod: 25

## 2019-11-23 PROCEDURE — 80202 ASSAY OF VANCOMYCIN: CPT | Performed by: INTERNAL MEDICINE

## 2019-11-23 PROCEDURE — 96361 HYDRATE IV INFUSION ADD-ON: CPT

## 2019-11-23 PROCEDURE — 87040 BLOOD CULTURE FOR BACTERIA: CPT | Performed by: STUDENT IN AN ORGANIZED HEALTH CARE EDUCATION/TRAINING PROGRAM

## 2019-11-23 PROCEDURE — 93306 TTE W/DOPPLER COMPLETE: CPT | Mod: 26 | Performed by: INTERNAL MEDICINE

## 2019-11-23 PROCEDURE — 25000132 ZZH RX MED GY IP 250 OP 250 PS 637: Performed by: STUDENT IN AN ORGANIZED HEALTH CARE EDUCATION/TRAINING PROGRAM

## 2019-11-23 PROCEDURE — 00000146 ZZHCL STATISTIC GLUCOSE BY METER IP

## 2019-11-23 PROCEDURE — 96365 THER/PROPH/DIAG IV INF INIT: CPT

## 2019-11-23 PROCEDURE — 71045 X-RAY EXAM CHEST 1 VIEW: CPT

## 2019-11-23 PROCEDURE — 80053 COMPREHEN METABOLIC PANEL: CPT | Performed by: INTERNAL MEDICINE

## 2019-11-23 PROCEDURE — 86900 BLOOD TYPING SEROLOGIC ABO: CPT | Performed by: INTERNAL MEDICINE

## 2019-11-23 PROCEDURE — 93308 TTE F-UP OR LMTD: CPT | Mod: 26 | Performed by: INTERNAL MEDICINE

## 2019-11-23 PROCEDURE — 93010 ELECTROCARDIOGRAM REPORT: CPT | Mod: 59 | Performed by: INTERNAL MEDICINE

## 2019-11-23 PROCEDURE — 83880 ASSAY OF NATRIURETIC PEPTIDE: CPT | Performed by: INTERNAL MEDICINE

## 2019-11-23 PROCEDURE — 86901 BLOOD TYPING SEROLOGIC RH(D): CPT | Performed by: INTERNAL MEDICINE

## 2019-11-23 PROCEDURE — 93306 TTE W/DOPPLER COMPLETE: CPT

## 2019-11-23 PROCEDURE — 85610 PROTHROMBIN TIME: CPT | Performed by: INTERNAL MEDICINE

## 2019-11-23 PROCEDURE — 93005 ELECTROCARDIOGRAM TRACING: CPT

## 2019-11-23 PROCEDURE — 25000128 H RX IP 250 OP 636: Performed by: INTERNAL MEDICINE

## 2019-11-23 PROCEDURE — 20000004 ZZH R&B ICU UMMC

## 2019-11-23 PROCEDURE — 40000556 ZZH STATISTIC PERIPHERAL IV START W US GUIDANCE

## 2019-11-23 PROCEDURE — 84443 ASSAY THYROID STIM HORMONE: CPT | Performed by: INTERNAL MEDICINE

## 2019-11-23 PROCEDURE — 81001 URINALYSIS AUTO W/SCOPE: CPT | Performed by: INTERNAL MEDICINE

## 2019-11-23 PROCEDURE — 93308 TTE F-UP OR LMTD: CPT

## 2019-11-23 PROCEDURE — 82803 BLOOD GASES ANY COMBINATION: CPT

## 2019-11-23 PROCEDURE — 84484 ASSAY OF TROPONIN QUANT: CPT | Performed by: INTERNAL MEDICINE

## 2019-11-23 PROCEDURE — 86140 C-REACTIVE PROTEIN: CPT | Performed by: INTERNAL MEDICINE

## 2019-11-23 PROCEDURE — 83735 ASSAY OF MAGNESIUM: CPT | Performed by: INTERNAL MEDICINE

## 2019-11-23 PROCEDURE — 87040 BLOOD CULTURE FOR BACTERIA: CPT | Performed by: INTERNAL MEDICINE

## 2019-11-23 PROCEDURE — 85652 RBC SED RATE AUTOMATED: CPT | Performed by: INTERNAL MEDICINE

## 2019-11-23 PROCEDURE — 85025 COMPLETE CBC W/AUTO DIFF WBC: CPT | Performed by: INTERNAL MEDICINE

## 2019-11-23 PROCEDURE — 74176 CT ABD & PELVIS W/O CONTRAST: CPT

## 2019-11-23 RX ORDER — ATORVASTATIN CALCIUM 40 MG/1
40 TABLET, FILM COATED ORAL AT BEDTIME
Status: DISCONTINUED | OUTPATIENT
Start: 2019-11-23 | End: 2019-11-27 | Stop reason: HOSPADM

## 2019-11-23 RX ORDER — SENNOSIDES 8.6 MG
8.6 TABLET ORAL 2 TIMES DAILY PRN
Status: DISCONTINUED | OUTPATIENT
Start: 2019-11-23 | End: 2019-11-27 | Stop reason: HOSPADM

## 2019-11-23 RX ORDER — POLYETHYLENE GLYCOL 3350 17 G/17G
17 POWDER, FOR SOLUTION ORAL DAILY PRN
Status: DISCONTINUED | OUTPATIENT
Start: 2019-11-23 | End: 2019-11-27 | Stop reason: HOSPADM

## 2019-11-23 RX ORDER — ONDANSETRON 2 MG/ML
4 INJECTION INTRAMUSCULAR; INTRAVENOUS EVERY 6 HOURS PRN
Status: DISCONTINUED | OUTPATIENT
Start: 2019-11-23 | End: 2019-11-27 | Stop reason: HOSPADM

## 2019-11-23 RX ORDER — HEPARIN SODIUM 1000 [USP'U]/ML
3 INJECTION, SOLUTION INTRAVENOUS; SUBCUTANEOUS ONCE
Status: COMPLETED | OUTPATIENT
Start: 2019-11-23 | End: 2019-11-23

## 2019-11-23 RX ORDER — LIDOCAINE 40 MG/G
CREAM TOPICAL
Status: DISCONTINUED | OUTPATIENT
Start: 2019-11-23 | End: 2019-11-27 | Stop reason: HOSPADM

## 2019-11-23 RX ORDER — OXYCODONE HYDROCHLORIDE 5 MG/1
5 TABLET ORAL EVERY 8 HOURS PRN
Status: DISCONTINUED | OUTPATIENT
Start: 2019-11-23 | End: 2019-11-26

## 2019-11-23 RX ORDER — NALOXONE HYDROCHLORIDE 0.4 MG/ML
.1-.4 INJECTION, SOLUTION INTRAMUSCULAR; INTRAVENOUS; SUBCUTANEOUS
Status: DISCONTINUED | OUTPATIENT
Start: 2019-11-23 | End: 2019-11-27 | Stop reason: HOSPADM

## 2019-11-23 RX ORDER — ALBUTEROL SULFATE 90 UG/1
2 AEROSOL, METERED RESPIRATORY (INHALATION) EVERY 6 HOURS PRN
Status: DISCONTINUED | OUTPATIENT
Start: 2019-11-23 | End: 2019-11-27 | Stop reason: HOSPADM

## 2019-11-23 RX ORDER — PIPERACILLIN SODIUM, TAZOBACTAM SODIUM 2; .25 G/10ML; G/10ML
2.25 INJECTION, POWDER, LYOPHILIZED, FOR SOLUTION INTRAVENOUS EVERY 8 HOURS SCHEDULED
Status: DISCONTINUED | OUTPATIENT
Start: 2019-11-23 | End: 2019-11-24

## 2019-11-23 RX ORDER — PIPERACILLIN SODIUM, TAZOBACTAM SODIUM 2; .25 G/10ML; G/10ML
2.25 INJECTION, POWDER, LYOPHILIZED, FOR SOLUTION INTRAVENOUS ONCE
Status: COMPLETED | OUTPATIENT
Start: 2019-11-23 | End: 2019-11-23

## 2019-11-23 RX ORDER — ONDANSETRON 4 MG/1
4 TABLET, FILM COATED ORAL EVERY 6 HOURS PRN
Status: DISCONTINUED | OUTPATIENT
Start: 2019-11-23 | End: 2019-11-23

## 2019-11-23 RX ADMIN — ATORVASTATIN CALCIUM 40 MG: 40 TABLET, FILM COATED ORAL at 21:05

## 2019-11-23 RX ADMIN — PIPERACILLIN SODIUM AND TAZOBACTAM SODIUM 2.25 G: 2; .25 INJECTION, POWDER, LYOPHILIZED, FOR SOLUTION INTRAVENOUS at 13:07

## 2019-11-23 RX ADMIN — SENNOSIDES 8.6 MG: 8.6 TABLET, FILM COATED ORAL at 20:42

## 2019-11-23 RX ADMIN — SODIUM CHLORIDE 500 ML: 9 INJECTION, SOLUTION INTRAVENOUS at 10:33

## 2019-11-23 RX ADMIN — UMECLIDINIUM 1 PUFF: 62.5 AEROSOL, POWDER ORAL at 18:28

## 2019-11-23 RX ADMIN — VANCOMYCIN HYDROCHLORIDE 750 MG: 1 INJECTION, POWDER, LYOPHILIZED, FOR SOLUTION INTRAVENOUS at 15:44

## 2019-11-23 RX ADMIN — FLUTICASONE FUROATE AND VILANTEROL TRIFENATATE 1 PUFF: 200; 25 POWDER RESPIRATORY (INHALATION) at 18:27

## 2019-11-23 RX ADMIN — GLUCAGON HYDROCHLORIDE 5 MG: 1 INJECTION, POWDER, FOR SOLUTION INTRAMUSCULAR; INTRAVENOUS; SUBCUTANEOUS at 16:48

## 2019-11-23 RX ADMIN — HEPARIN SODIUM 3000 UNITS: 1000 INJECTION, SOLUTION INTRAVENOUS; SUBCUTANEOUS at 14:21

## 2019-11-23 RX ADMIN — ONDANSETRON 4 MG: 2 INJECTION INTRAMUSCULAR; INTRAVENOUS at 21:09

## 2019-11-23 RX ADMIN — HEPARIN SODIUM 3000 UNITS: 1000 INJECTION, SOLUTION INTRAVENOUS; SUBCUTANEOUS at 14:22

## 2019-11-23 RX ADMIN — PIPERACILLIN SODIUM AND TAZOBACTAM SODIUM 2.25 G: 2; .25 INJECTION, POWDER, LYOPHILIZED, FOR SOLUTION INTRAVENOUS at 20:41

## 2019-11-23 ASSESSMENT — ACTIVITIES OF DAILY LIVING (ADL)
RETIRED_EATING: 0-->INDEPENDENT
TRANSFERRING: 1-->ASSISTIVE EQUIPMENT
RETIRED_COMMUNICATION: 0-->UNDERSTANDS/COMMUNICATES WITHOUT DIFFICULTY
TOILETING: 0-->INDEPENDENT
FALL_HISTORY_WITHIN_LAST_SIX_MONTHS: NO
DRESS: 0-->INDEPENDENT
SWALLOWING: 0-->SWALLOWS FOODS/LIQUIDS WITHOUT DIFFICULTY
WHICH_OF_THE_ABOVE_FUNCTIONAL_RISKS_HAD_A_RECENT_ONSET_OR_CHANGE?: AMBULATION
COGNITION: 0 - NO COGNITION ISSUES REPORTED
AMBULATION: 1-->ASSISTIVE EQUIPMENT
ADLS_ACUITY_SCORE: 17
BATHING: 0-->INDEPENDENT
ADLS_ACUITY_SCORE: 16

## 2019-11-23 ASSESSMENT — ENCOUNTER SYMPTOMS
ABDOMINAL PAIN: 0
COUGH: 1
NAUSEA: 0
ARTHRALGIAS: 0
EYE REDNESS: 0
COLOR CHANGE: 0
SHORTNESS OF BREATH: 1
CONFUSION: 0
FEVER: 0
HEADACHES: 0
DIFFICULTY URINATING: 0
DIZZINESS: 0
NECK STIFFNESS: 0

## 2019-11-23 ASSESSMENT — MIFFLIN-ST. JEOR: SCORE: 1126.88
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
## 2019-11-23 NOTE — ED NOTES
Attempted to call patients family member angelina to give her updates as asked by pt. Angelina not available at time of call.

## 2019-11-23 NOTE — PLAN OF CARE
Admitted/transferred from: ED  Reason for admission/transfer: Hypotension @ dialysis center  Patient status upon admission/transfer: Mentating well but hypotensive 70s/50s and bradycardic to the 50s. Afebrile. On no drips.   Interventions: IV glucogen for reversal of pts home coreg, monitoring.  Plan: Monitor overnight, IV abx.   2 RN skin assessment: completed by Rebecca Lewis & Margret Stewart.   Result of skin assessment and interventions/actions: Blanchable redness  Height, weight, drug calc weight: Done  Patient belongings (see Flowsheet - Adult Profile for details): Purse, cell phone, jacket, clothes & shoes staying w/ pt in room.  MDRO education (if applicable): N/A

## 2019-11-23 NOTE — PROGRESS NOTES
Anaerobic and aerobic blood cultures drawn off of both lines of dialysis catheter and sent to lab. Both lines flushed with saline, followed by 1.9 mL heparin.

## 2019-11-23 NOTE — ED NOTES
Initial Assessment: HyPOtension noted with decreased O2 Sats.  Communicates needs without difficulty. Pleasant and co-op. C/O SOB. Denies chest/shoulder/arm pain or discomfort. No acute distress noted. MD aware of SYS B/P 70-80. NS(1000ml) bolus infusing to support B/P.
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
## 2019-11-23 NOTE — PHARMACY-VANCOMYCIN DOSING SERVICE
Pharmacy Vancomycin Initial Note  Date of Service 2019  Patient's  1945  74 year old, female    Indication: Sepsis    Current estimated CrCl = Estimated Creatinine Clearance: 12.3 mL/min (A) (based on SCr of 3.73 mg/dL (H)).    Creatinine for last 3 days  2019:  8:32 AM Creatinine 5.19 mg/dL  2019: 10:03 AM Creatinine 3.73 mg/dL    Recent Vancomycin Level(s) for last 3 days  No results found for requested labs within last 72 hours.      Vancomycin IV Administrations (past 72 hours)                   vancomycin (VANCOCIN) 1000 mg in dextrose 5% 200 mL PREMIX (g) 1 g Given 19 1055                Nephrotoxins and other renal medications (From now, onward)    Start     Dose/Rate Route Frequency Ordered Stop    19 1234  piperacillin-tazobactam (ZOSYN) 2.25 g vial to attach to  ml bag      2.25 g  over 30 Minutes Intravenous ONCE 19 1233            Contrast Orders - past 72 hours (72h ago, onward)    None                Plan:  1.  Will obtain a vanco level prior to dosing. Patient came in yesterday for a procedure and received 1g (17mg/kg) IV vanco once for the procedure. Patient is on dialysis and scheduled for ,, session and was only 45 mins into today's session before having to quit and go to the ED. I'm unsure if patient is producing urine at the moment.   2.  Goal Trough Level: 15-20 mg/L   3.  Pharmacy will check trough levels as appropriate before dialysis session.    4. Serum creatinine levels will be ordered daily for the first week of therapy and at least twice weekly for subsequent weeks.    5. [place] method utilized to dose vancomycin therapy: Method 1    Jaswant Dee RPH

## 2019-11-23 NOTE — PLAN OF CARE
ICU End of Shift Summary. See flowsheets for vital signs and detailed assessment.    Changes this shift: Pt admitted from ED for hypotension but asymptomatic. BPs much improved on bilateral lower extremities. Remains bradycardic in the 50s. Started on Vanco. Awaiting blood cultures & HD line cultures. Patient stated she may have taken extra Coreg this AM-given 5 mg of Glucagon for reversal. CT chest/abdomen done.     Plan:  Monitor overnight.

## 2019-11-23 NOTE — PHARMACY-VANCOMYCIN DOSING SERVICE
Pharmacy Vancomycin Note  Date of Service 2019  Patient's  1945   74 year old, female    Indication: Sepsis  Goal Trough Level: 15-20 mg/L  Day of Therapy: 1  Current Vancomycin regimen:  Intermittent vanco dosing    Current estimated CrCl = Estimated Creatinine Clearance: 12.3 mL/min (A) (based on SCr of 3.73 mg/dL (H)).    Creatinine for last 3 days  2019:  8:32 AM Creatinine 5.19 mg/dL  2019: 10:03 AM Creatinine 3.73 mg/dL    Recent Vancomycin Levels (past 3 days)  2019: 10:03 AM Vancomycin Level 11.9 mg/L    Vancomycin IV Administrations (past 72 hours)                   vancomycin (VANCOCIN) 1000 mg in dextrose 5% 200 mL PREMIX (g) 1 g Given 19 1055                Nephrotoxins and other renal medications (From now, onward)    Start     Dose/Rate Route Frequency Ordered Stop    19 1254  vancomycin place wolfe - receiving intermittent dosing      1 each Intravenous SEE ADMIN INSTRUCTIONS 19 1254               Contrast Orders - past 72 hours (72h ago, onward)    None          Interpretation of levels and current regimen:  Trough level is  Subtherapeutic    Has serum creatinine changed > 50% in last 72 hours: Yes    Urine output:  unable to determine    Renal Function: ESRD on Dialysis    Plan:  1.  Patient is subtherapeutic after yesterday's vanco dose, will redose at 750mg IV once (12.7mg/kg)  2.  Pharmacy will check trough levels as appropriate, before next dialysis session    3. Serum creatinine levels will be ordered daily for the first week of therapy and at least twice weekly for subsequent weeks.      Jaswant Dee [facility]

## 2019-11-23 NOTE — PROGRESS NOTES
[facility] MICU STAFF BRIEF ICU ACCEPTANCE NOTE    Assessment:  Kim Anne is a 74 year old woman admitted on 11/23/2019 from our ED with hypotension. History of ESRD on T/T/Sa dialysis. Was noted to be hypotensive during a her Thursday run on 11/21 but able to complete the entire session. Today at dialysis she became hypotensive to the 60's after 45 minutes, they gave her 1L back without response, and she was transferred to the ED for ongoing evaluation. Here she is entirely asymptomatic on review of symptoms. She has a R chest tunneled dialysis line which has appears clean and no reports of erythema, pain or discharge. No infectious/constitutional symptoms reported. She received some crystalloid in the ED with transient response. Systolic from R UE ranging 60-80's. In the MICU, it is noted she is on Coreg 6.25 mg BID at home and she's unsure if she accidentally took too much. We evaluated her Coreg Rx vial which was filled on 11/20; there were approximately 4 pills unaccounted for which could coincide with doubling up her dose over the last few days. Her heart rate is in the high 40's to low 50's. Upon checking a blood pressure from her R LE, her MAP's are noted to be >65. Lactate is normal, mentation is normal, she is asymptomatic. Her WBC is 12, but has been in the 10-12 range dating back to 6/2019. We're administering a robb dose of Glucagon (more for potential diagnostic purposes than any therapeutic effect), hold Coreg and allow for washout to occur, and I'm comfortable continuing to follow cuff pressures off her lower extremity given her otherwise clinical stability. It's certainly possible she has an occult bacteremia and so we've obtained blood cultures and started empiric antibiotics. Of note, she has a history of hypothyroidism and TSH was noted to be 80's, free Tf 0.54; based on an 11/20 discharge pharmacy note, it seems Kim has been non-compliant with her home Synthroid. No  clinical evidence of myxedema.     Acute Issues:    Hypotension, sepsis vs beta blocker mediated    Possible beta blocker overdose, mild    Hypothyroidism     ESRD on T/Th/Sa HD    Leukocytosis, chronic    Plan:    Continue to check blood pressure off LE    Test dose of IV Glucagon 5 mg x1    Restart home Synthroid (non-compliance at home)    Await pending culture data, continue empiric antibiotics      Please see the resident's note from today for full details. The patient was seen and examined with the resident/fellow physician.  We have discussed the patient in detail and I agree with the findings, assessment, and plan as documented when resident's separate note was cosigned on this day. The plan was formulated in conjunction with pharmacy, ICU nurses, and respiratory therapist. I have evaluated all laboratory values and imaging studies for the past 24 hours. I have reviewed all the consults that have been ordered and are active for this patient.      Critical Care Time: 30 min.  I spent this time (excluding procedures) personally providing and directing critical care services at the bedside and on the critical care unit.          Brice Rivas MD, 11/23/2019, 4:55 PM  Department of Pulmonary, Allergy, Critical Care & Sleep Medicine   Pager: [phone]

## 2019-11-24 LAB
ANION GAP SERPL CALCULATED.3IONS-SCNC: 8 MMOL/L (ref 3–14)
BUN SERPL-MCNC: 36 MG/DL (ref 7–30)
CALCIUM SERPL-MCNC: 7.8 MG/DL (ref 8.5–10.1)
CHLORIDE SERPL-SCNC: 106 MMOL/L (ref 94–109)
CO2 SERPL-SCNC: 24 MMOL/L (ref 20–32)
CREAT SERPL-MCNC: 4.98 MG/DL (ref 0.52–1.04)
ERYTHROCYTE [DISTWIDTH] IN BLOOD BY AUTOMATED COUNT: 18.6 % (ref 10–15)
FLUAV+FLUBV RNA SPEC QL NAA+PROBE: NEGATIVE
FLUAV+FLUBV RNA SPEC QL NAA+PROBE: NEGATIVE
GFR SERPL CREATININE-BSD FRML MDRD: 8 ML/MIN/{1.73_M2}
GLUCOSE BLDC GLUCOMTR-MCNC: 115 MG/DL (ref 70–99)
GLUCOSE BLDC GLUCOMTR-MCNC: 119 MG/DL (ref 70–99)
GLUCOSE SERPL-MCNC: 133 MG/DL (ref 70–99)
GRAM STN SPEC: NORMAL
HCT VFR BLD AUTO: 32.7 % (ref 35–47)
HGB BLD-MCNC: 10 G/DL (ref 11.7–15.7)
L PNEUMO1 AG UR QL IA: NORMAL
Lab: NORMAL
MAGNESIUM SERPL-MCNC: 1.9 MG/DL (ref 1.6–2.3)
MCH RBC QN AUTO: 30.1 PG (ref 26.5–33)
MCHC RBC AUTO-ENTMCNC: 30.6 G/DL (ref 31.5–36.5)
MCV RBC AUTO: 99 FL (ref 78–100)
PLATELET # BLD AUTO: 182 10E9/L (ref 150–450)
POTASSIUM SERPL-SCNC: 4.6 MMOL/L (ref 3.4–5.3)
RBC # BLD AUTO: 3.32 10E12/L (ref 3.8–5.2)
RSV RNA SPEC NAA+PROBE: NEGATIVE
SODIUM SERPL-SCNC: 139 MMOL/L (ref 133–144)
SPECIMEN SOURCE: NORMAL
WBC # BLD AUTO: 7.7 10E9/L (ref 4–11)

## 2019-11-24 PROCEDURE — 25000132 ZZH RX MED GY IP 250 OP 250 PS 637: Performed by: STUDENT IN AN ORGANIZED HEALTH CARE EDUCATION/TRAINING PROGRAM

## 2019-11-24 PROCEDURE — 99233 SBSQ HOSP IP/OBS HIGH 50: CPT | Performed by: STUDENT IN AN ORGANIZED HEALTH CARE EDUCATION/TRAINING PROGRAM

## 2019-11-24 PROCEDURE — 87899 AGENT NOS ASSAY W/OPTIC: CPT | Performed by: STUDENT IN AN ORGANIZED HEALTH CARE EDUCATION/TRAINING PROGRAM

## 2019-11-24 PROCEDURE — 87205 SMEAR GRAM STAIN: CPT | Performed by: STUDENT IN AN ORGANIZED HEALTH CARE EDUCATION/TRAINING PROGRAM

## 2019-11-24 PROCEDURE — 25000128 H RX IP 250 OP 636: Performed by: PHYSICIAN ASSISTANT

## 2019-11-24 PROCEDURE — 99233 SBSQ HOSP IP/OBS HIGH 50: CPT | Performed by: INTERNAL MEDICINE

## 2019-11-24 PROCEDURE — 25000128 H RX IP 250 OP 636: Performed by: STUDENT IN AN ORGANIZED HEALTH CARE EDUCATION/TRAINING PROGRAM

## 2019-11-24 PROCEDURE — 5A1D70Z PERFORMANCE OF URINARY FILTRATION, INTERMITTENT, LESS THAN 6 HOURS PER DAY: ICD-10-PCS | Performed by: INTERNAL MEDICINE

## 2019-11-24 PROCEDURE — 87106 FUNGI IDENTIFICATION YEAST: CPT | Performed by: STUDENT IN AN ORGANIZED HEALTH CARE EDUCATION/TRAINING PROGRAM

## 2019-11-24 PROCEDURE — 83735 ASSAY OF MAGNESIUM: CPT | Performed by: STUDENT IN AN ORGANIZED HEALTH CARE EDUCATION/TRAINING PROGRAM

## 2019-11-24 PROCEDURE — 93010 ELECTROCARDIOGRAM REPORT: CPT | Performed by: INTERNAL MEDICINE

## 2019-11-24 PROCEDURE — 00000146 ZZHCL STATISTIC GLUCOSE BY METER IP

## 2019-11-24 PROCEDURE — 80048 BASIC METABOLIC PNL TOTAL CA: CPT | Performed by: STUDENT IN AN ORGANIZED HEALTH CARE EDUCATION/TRAINING PROGRAM

## 2019-11-24 PROCEDURE — 87631 RESP VIRUS 3-5 TARGETS: CPT | Performed by: STUDENT IN AN ORGANIZED HEALTH CARE EDUCATION/TRAINING PROGRAM

## 2019-11-24 PROCEDURE — 36415 COLL VENOUS BLD VENIPUNCTURE: CPT | Performed by: STUDENT IN AN ORGANIZED HEALTH CARE EDUCATION/TRAINING PROGRAM

## 2019-11-24 PROCEDURE — 85027 COMPLETE CBC AUTOMATED: CPT | Performed by: STUDENT IN AN ORGANIZED HEALTH CARE EDUCATION/TRAINING PROGRAM

## 2019-11-24 PROCEDURE — 87070 CULTURE OTHR SPECIMN AEROBIC: CPT | Performed by: STUDENT IN AN ORGANIZED HEALTH CARE EDUCATION/TRAINING PROGRAM

## 2019-11-24 PROCEDURE — 93005 ELECTROCARDIOGRAM TRACING: CPT

## 2019-11-24 PROCEDURE — 12000001 ZZH R&B MED SURG/OB UMMC

## 2019-11-24 RX ORDER — AZITHROMYCIN 250 MG/1
500 TABLET, FILM COATED ORAL DAILY
Status: DISCONTINUED | OUTPATIENT
Start: 2019-11-24 | End: 2019-11-24

## 2019-11-24 RX ORDER — PIPERACILLIN SODIUM, TAZOBACTAM SODIUM 2; .25 G/10ML; G/10ML
2.25 INJECTION, POWDER, LYOPHILIZED, FOR SOLUTION INTRAVENOUS EVERY 6 HOURS
Status: DISCONTINUED | OUTPATIENT
Start: 2019-11-24 | End: 2019-11-24

## 2019-11-24 RX ORDER — AZITHROMYCIN 250 MG/1
250 TABLET, FILM COATED ORAL DAILY
Status: DISCONTINUED | OUTPATIENT
Start: 2019-11-25 | End: 2019-11-25

## 2019-11-24 RX ORDER — CEFTRIAXONE 2 G/1
2 INJECTION, POWDER, FOR SOLUTION INTRAMUSCULAR; INTRAVENOUS EVERY 24 HOURS
Status: DISCONTINUED | OUTPATIENT
Start: 2019-11-24 | End: 2019-11-25

## 2019-11-24 RX ADMIN — ATORVASTATIN CALCIUM 40 MG: 40 TABLET, FILM COATED ORAL at 21:28

## 2019-11-24 RX ADMIN — UMECLIDINIUM 1 PUFF: 62.5 AEROSOL, POWDER ORAL at 08:59

## 2019-11-24 RX ADMIN — PIPERACILLIN SODIUM AND TAZOBACTAM SODIUM 2.25 G: 2; .25 INJECTION, POWDER, LYOPHILIZED, FOR SOLUTION INTRAVENOUS at 04:19

## 2019-11-24 RX ADMIN — OXYCODONE HYDROCHLORIDE 5 MG: 5 TABLET ORAL at 23:38

## 2019-11-24 RX ADMIN — POLYETHYLENE GLYCOL 3350 17 G: 17 POWDER, FOR SOLUTION ORAL at 16:03

## 2019-11-24 RX ADMIN — PIPERACILLIN SODIUM AND TAZOBACTAM SODIUM 2.25 G: 2; .25 INJECTION, POWDER, LYOPHILIZED, FOR SOLUTION INTRAVENOUS at 10:20

## 2019-11-24 RX ADMIN — Medication 1 MG: at 23:38

## 2019-11-24 RX ADMIN — FLUTICASONE FUROATE AND VILANTEROL TRIFENATATE 1 PUFF: 200; 25 POWDER RESPIRATORY (INHALATION) at 08:59

## 2019-11-24 RX ADMIN — CEFTRIAXONE SODIUM 2 G: 2 INJECTION, POWDER, FOR SOLUTION INTRAMUSCULAR; INTRAVENOUS at 15:52

## 2019-11-24 RX ADMIN — AZITHROMYCIN 500 MG: 500 TABLET, FILM COATED ORAL at 12:23

## 2019-11-24 RX ADMIN — LEVOTHYROXINE SODIUM 200 MCG: 0.03 TABLET ORAL at 08:59

## 2019-11-24 ASSESSMENT — ACTIVITIES OF DAILY LIVING (ADL)
ADLS_ACUITY_SCORE: 17
ADLS_ACUITY_SCORE: 17
ADLS_ACUITY_SCORE: 15
ADLS_ACUITY_SCORE: 15
ADLS_ACUITY_SCORE: 17
ADLS_ACUITY_SCORE: 17

## 2019-11-24 ASSESSMENT — MIFFLIN-ST. JEOR: SCORE: 1131.88

## 2019-11-24 NOTE — PROGRESS NOTES
"SPIRITUAL HEALTH SERVICES  SPIRITUAL ASSESSMENT Progress Note  [facility] ([place]) 5A     Pt requested Rosary. \" I had one but don't know where it went.\"  I gave her another rosary.     PLAN: Spiritual Health Services remains available for patient's ongoing support.    Massimo Jean-Baptiste M.Div.     Pager [phone]    "

## 2019-11-24 NOTE — PROGRESS NOTES
"                              Internal Medicine Progress Note - Gold Service  Kim Anne MRN: [id number]  Age: 74 year old, : 1945  Date: 2019         Interval History:     No acute events overnight. Reports feeling week overall. No focal symptom.     Last 24 hr care team notes reviewed.     ROS:  4 point ROS including Respiratory, CV, GI and , is negative.     PHYSICAL EXAM    Vital signs:  Temp: 97.7  F (36.5  C) Temp src: Oral BP: 102/43 Pulse: (!) 49 Heart Rate: 51 Resp: 16 SpO2: 97 % O2 Device: None (Room air) Oxygen Delivery: 2 LPM Height: 165.1 cm (5' 5\") Weight: 63.1 kg (139 lb 1.8 oz)  Estimated body mass index is 23.15 kg/m  as calculated from the following:    Height as of this encounter: 1.651 m (5' 5\").    Weight as of this encounter: 63.1 kg (139 lb 1.8 oz).      Intake/Output Summary (Last 24 hours) at 2019 0821  Last data filed at 2019 0700  Gross per 24 hour   Intake 910 ml   Output --   Net 910 ml     GEN: NAD, resting comfortably on exam, AAox3  HEENT: NC/AT , well injected conjunctiva, anicteric sclera, EOMI  Neck: trachea midline, no JVD  CV: RRR, nl S1/S2 no mumurs  PULM: bibasilar inspiratory rales   Abdomen: soft, non tender/distented , BS +   EXTREMITIES: warm, no pedal edema  SKIN: No rashes  NEURO: MS: AAOx3  - No focal deficit     DIAGNOSTIC STUDIES  I reviewed all medications, laboratory results, and imaging over the past 24 hours. Notable for;   ROUTINE LABS:   Mg 1.9  Hgb 10.0 (12.9)  WBC 7.7 (12.1)  UA : 2 WBC , negative LE     MICROBIOLOGY  Blood culture  NGTD   Blood culture  NGTD   Blood culture  NGTD     IMAGING  CT chest/abd ():  1. Upper lung predominant micronodules and scattered tree-in-bud  opacities, atypical infection versus respiratory bronchiolitis  (smoking related injury) versus, less likely, hypersensitivity  pneumonitis. Associated right lower lobe predominant bronchial wall  thickening and mucous " plugging. Near resolution of the previously seen  pleural effusions on 5/25/2019 CT.  2. Lobulated fluid attenuation medial to the spleen is grossly  unchanged compared to 5/25/2019 and favored benign given stability.  Limited evaluation secondary to lack of IV contrast. Differential  includes a lymphangioma. This could be further evaluated with MRI on a  nonemergent basis, if clinically indicated.  3. Moderate nonspecific right perinephric fat stranding. Correlate for  urinary tract infection/pyelonephritis. No appreciable urinary tract  stone or hydronephrosis.    Echocardiogram (11/23):  Global and regional left ventricular function is hyperkinetic with an EF of  65-70%.  The pattern of wall thickening is consistent with hypertrophic cardiomyopathy.  Basal septum measures 1.9 cm. LVOT gradient is 33 mmHg at rest. No RICHELLE or MR.  Global right ventricular function is normal.  IVC diameter <2.1 cm collapsing >50% with sniff suggests a normal RA pressure  of 3 mmHg.  No pericardial effusion is present.  No change from prior study.    Assessment and Plan:     Date of admission 11/23/2019  Ms Anne is a 72yo woman with a PMH of solitary R kidney, ESRD on HD TThSa, COPD, DMII, non obstructive CAD, A.tach/PSVT, and hypothyroidism who presents from HD with concerns of hypotension. She was in HD today when she felt SOB and was found to be hypotensive. Beta blocker unintentional overdose vs septic shock. She initially presented to the MICU and transferred to medicine on 11/24.    #Plan for today:  - Stop Vanc/zosyn  - Start Azithromycin + Ceftriaxone for CAP    # Hypotension 2/2 beta blocker toxicity vs severe sepsis   #CAP   #Leukocytosis   Patient with persistent bradycardia and hypotension despite fluid resuscitation in the ED. EKG on admission showing A.Tach with bradycardia. She noted taking too much coreg and has approximately 4 pills unaccounted for. She was challenged with Glucagon with a slight increase in HR.  Empirically started on Vancomycin + Zosyn for unclear source of infection    Infectious work up including CT chest/abd pelvis pertinent for tree in bud opacities c/w atypical infection. Will transition to Ceftriaxone and azithromycin for CAP therapy.    - Hold home coreg  - s/p 5mg glucagon for beta blocker toxicity with improvement   - Ceftriaxone + Azithromycin   - Sputum culture + Legionella Uag  - Negative flu swab   - Blood cultures with NGTD      # History of COPD   - Continue home albuterol, Advair and Tiotropium     #History of PSVT/A.Tach   Seen by Dr. Cool on 9/2019 where she was transitioned from diltiazem to Carvedilol. If BP continues to be an issue we can transition back to CCB or Metoprolol.   - Hold Coreg for now     #Non obstructive CAD   #History of A.Tach   - Ap: Aspirin 81 mg   - Statin: atorvastatin 40 mg   - BB: holding coreg for now   - EF>35%, no need for ACE/ARB, Ald ant, SCD ppx       # S/P LDKT 1988  # ESRD 2/2 Diabetic Nephropathy on HD TTh  - Nephrology consult placed for management of HD  - Access: Tunneled subclavian       #Hypothyroidism   Patient with a h/o hypothyroidism, TSH checked on admission was 83 with T4 0.54. Was noted in her med rec that she has not been taking her Levothyroxine. No clinical signs of myxedema.   - Restart home dose Levothyroxine  - Should follow up with Endocrinology at discharge      #DMII  H/o DMII c/b nephropathy. Most recent A1C 5.2 on 5/1/19. No current medications for diabetes.   - Continue to monitor BG      Consulting Services:   Nephrology     Diet: Regular   VTE prophylaxis: SCDs   Family: Not updated  PT/OT: Y/Y  Code status: Full    Disposition: 1-2 days pending stable BP and PT assessment.     Lines:  Subclavian line   PIVs     Patient care plan discussed beside with patient, RN.   Vickie Singer MD  Internal Medicine Hospitalist & Staff Physician  [facility]  Pager: [phone]    Team: [facility]  Cover between 5pm-8am: pager [phone] (Gold), if no response then page job code [phone].

## 2019-11-24 NOTE — PLAN OF CARE
"Pt transferred from  at 1245. 4eyes completed. Ambulates with SBA. Pt is A&Ox4 denies pain or nausea this shift. Does report constipation. Pt admitted for possible coreg overdose consistent hypotension and bradycardia (40-50)   BP measures soft in LUE Pt has fistula on RUE, therefore BP is best measured on lower extremities.     /46 (BP Location: Left leg)   Pulse (!) 49   Temp 98.8  F (37.1  C) (Oral)   Resp 14   Ht 1.651 m (5' 5\")   Wt 63.1 kg (139 lb 1.8 oz)   SpO2 98%   BMI 23.15 kg/m      "
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
## 2019-11-24 NOTE — PROGRESS NOTES
Admission/Transfer from:   2 RN skin assessment completed. Yes  Significant findings include: blanchable redness near rectum   WOC Nurse Consult Ordered? No  Was NST/NA able to join during assessment? No
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
## 2019-11-24 NOTE — PLAN OF CARE
Transferred to:  at 1245  Status at time of transfer: AVSS, up with standby assist and home walker  Belongings: sent with patient  Emery removed? (if no, why?): No emery present  Chart and medications: sent with patient  Family notified: patient will notify  Problem: Adult Inpatient Plan of Care  Goal: Plan of Care Review  11/24/2019 1248 by Jovanna Feldman, RN  Outcome: Improving

## 2019-11-24 NOTE — PROGRESS NOTES
SPIRITUAL HEALTH SERVICES  SPIRITUAL ASSESSMENT Progress Note  [facility] 4C   ON-CALL VISIT    REFERRAL SOURCE: Hospital  request    Pt Kim requested a rosery from [facility]. I provided her with the rosery and she declined any additional services at this time.      PLAN: Unit  will be informed of my visit for follow up. Spiritual Health is available to Kim per request.     Chaparrita White  Chaplain Resident  Pager [phone]

## 2019-11-24 NOTE — PROGRESS NOTES
[facility], [place]    Medicine Transfer accept Note - Hospitalist Service, Gold night       Date of Admission:  11/23/2019  Assessment & Plan   Ms Anne is a 74yo woman with a PMH of solitary R kidney, ESRD on HD TThSa, COPD, DMII, h/o MI, hypothyroidism and recent creation of R AV fistula for HD on 11/22 who presented from HD with hypotension w sBP in 60s and bradycardia, initially admitted to MICU on 11/23 as her BP did not improve w adequate IV hydration in ED. Concern for beta blocker overdose (4 pills of coreg unaccounted for) v septic shock, received glucagon test dose w no significant response and started on empiric antibiotics. In the MICU, L calf sBP 90s, HR high 40s to low 50s and she remained asymptomatic. She was downgraded to IMC     Hypotension 2/2 beta blocker toxicity vs septic shock vs other  Sinus bradycardia  Baseline sBP 90s. HR in 60s. BP widely varies by location. Has been asymptomatic throughout ED and MICU stay. BP did not respond to fluid resuscitation in the ED. She notes she may have taken too much coreg and has approximately 4 pills (6.25mg tabs) unaccounted for. She was challenged with Glucagon with a slight increase in HR, but not significant and was not started on gtt. She was started on empiric antibiotics for possible septic shock with most likely source being her HD cath vs recent AV fistula surgery on 11/22. LA 0.8  - Hold home coreg 6.25mg bid  - atropine if became symptomatic  - MAP goal >65  - checking BP on L calf  - cannot use R arm for BP due to new AV fistula  - Continue empiric antibiotics zosyn and vancomycin for now. Consider stopping if culture negative    H/o L kidney donation 1988  ESRD 2/2 Diabetic Nephropathy on HD TThSa  Recent AV fistula creation on 11/22  Was seen 5/2019 with moderate hyperkalemia and HD was initiated at that time. Developed hypotension during HD. HD for 1hr 45min on 11/23, although not completed due to  hypotension. Also could not complete 11/21 session due to hypotension. Received total 2.5L IVF for hypotension.  - Nephrology consult placed for management of HD.  - K 3.9 and sat 94% RA no emergent need for HD     Remote h/o MI with stent   No chest pain or sign of ischemia.  - Continue home ASA, statin  - Hold home coreg in the setting of possible beta blocker toxicity as above    COPD   Followed mostly by family practitioner. On home Albuterol prn inhaler, Advair and Tiotropium although there are notes that she may use these inconsistently. Denies SOB or changes in cough.  - Continue home albuterol, Advair and Tiotropium   - Continue to monitor respiratory status      Leukocytosis   Slightly elevated to 12.1. She is otherwise without localizing source of infection, is afebrile. Blood cultures pending.  - Continue empiric antibiotics   - Continue to monitor      Hypothyroidism   Patient with a h/o hypothyroidism, TSH checked on admission was 83 with T4 0.54. Was noted in her med rec that she has not been taking her Levothyroxine. No clinical signs of myxedema.   - Restart home dose Levothyroxine  - Should follow up with Endocrinology at discharge      DMII  H/o DMII c/b nephropathy. Most recent A1C 5.2 on 5/1/19. No current medications for diabetes.   - Continue to monitor BG     h/o chronic back pain.   Takes Tramadol and Oxycodone PRN for this. Holding these currently in the setting of her hypotension. No concerns of pain on examination.   - Continue to monitor for pain      Diet: Dialysis Diet    DVT Prophylaxis: Pneumatic Compression Devices  Chaney Catheter: not present  Code Status: Full Code      Disposition Plan   Expected discharge: 2 - 3 days, recommended to prior living arrangement once BP and HR stable, and able to tolerate HD.  Entered: Crystal Diop MD 11/24/2019, 3:01 AM       The patient's care was discussed with the Bedside Nurse and Patient.    Crystal Diop MD  [facility] Service[facility]  night  [facility], [place]  Pager:[phone]  Please see sticky note for cross cover information  ______________________________________________________________________    Interval History   Admitted to MICU for bradycardia and persistent hypotension. Stable and asymptomatic, thus downgraded to medicine.  Feeling OK, does not remember much. Has back pain, not new. Otherwise no pain. Other 4 point ROS negative    Data reviewed today: I reviewed all medications, new labs and imaging results over the last 24 hours.    Physical Exam   Vital Signs: Temp: 98  F (36.7  C) Temp src: Oral BP: 99/53 Pulse: (!) 49 Heart Rate: (!) 47 Resp: 12 SpO2: 94 % O2 Device: None (Room air) Oxygen Delivery: 2 LPM  Weight: 138 lbs .13 oz  General Appearance: Alert and oriented to place person  Respiratory: unlabored, coarse breath sound bilaterally, no wheezing  Cardiovascular: regular matthew  GI: soft non tender  Skin: R forarm AV fistula w good thrill, wound healing, no drainage    Data   Recent Labs   Lab 11/23/19  1003 11/22/19  0832 11/20/19  1059   WBC 12.1* 10.2 10.0   HGB 12.9 12.7 13.5   MCV 97 95 95    242 259   INR 1.02 0.97  --      --  135   POTASSIUM 3.9 4.4 4.5   CHLORIDE 101  --  99   CO2 26  --  27   BUN 21  --  35*   CR 3.73* 5.19* 5.69*   ANIONGAP 7  --  9   FRANCES 8.7  --  9.2   * 119* 160*   ALBUMIN 3.2*  --   --    PROTTOTAL 7.4  --   --    BILITOTAL 0.4  --   --    ALKPHOS 139  --   --    ALT 25  --   --    AST 25  --   --    TROPI 0.040  --   --      Recent Results (from the past 24 hour(s))   POC US ECHO LIMITED    Impression    Limited Bedside Cardiac Ultrasound, performed and interpreted by me.   Indication: Hypotension/shock.  Parasternal long axis, parasternal short axis, apical 4 chamber and subcostal views were acquired.   Image quality was satisfactory.    Findings:    Abnormal ?decreased LVEF on some view    IMPRESSION: Abnormal limited cardiac ultrasound showing ?LVEF  mildly low.  No pericardial effusion.   XR Chest Port 1 View    Narrative    EXAM: XR CHEST PORT 1 VW  2019 10:52 AM     HISTORY:  cough hypotension       COMPARISON:  10/1/2019    FINDINGS:   Single upright frontal radiograph of the chest. Right large bore  venous catheter tip projects at the mid SVC.    The trachea is midline. The cardiomediastinal silhouette is  well-defined..    Acute airspace opacity. Left pleural effusion. No pneumothorax.    Included osseous structures and soft tissues and upper abdomen are  within normal limits.      Impression    IMPRESSION: No acute airspace opacity. Small left pleural effusion.    I have personally reviewed the examination and initial interpretation  and I agree with the findings.    RAPHAEL BUTLER MD   Echocardiogram Complete    Narrative    [id number]  [id number]  [id number]  [id number]^HARMONY^EMY           [facility],[place]  Echocardiography Laboratory  [address] [place] [id number]     Name: JONEL CHAVEZ  MRN: [id number]  : 1945  Study Date: 2019 11:45 AM  Age: 74 yrs  Gender: Female  Patient [facility]: [facility]  Reason For Study: Dyspnea, hypotension  Ordering Physician: EMY ANTUNEZ  Performed By: HEMAL Larson     BSA: 1.6 m2  Height: 65 in  Weight: 130 lb  BP: 75/45 mmHg  _____________________________________________________________________________  __        Procedure  Complete Portable Echo Adult.  _____________________________________________________________________________  __        Interpretation Summary  Global and regional left ventricular function is hyperkinetic with an EF of  65-70%.  The pattern of wall thickening is consistent with hypertrophic cardiomyopathy.  Basal septum measures 1.9 cm. LVOT gradient is 33 mmHg at rest. No RICHELLE or MR.  Global right ventricular function is normal.  IVC diameter <2.1 cm collapsing >50% with sniff suggests a normal RA pressure  of 3 mmHg.  No  pericardial effusion is present.  No change from prior study.  _____________________________________________________________________________  __        Left Ventricle  Global and regional left ventricular function is hyperkinetic with an EF of  65-70%. Left ventricular size is normal. The pattern of wall thickening is  consistent with hypertrophic cardiomyopathy. Grade II or moderate diastolic  dysfunction.     Right Ventricle  The right ventricle is normal size. Global right ventricular function is  normal. There is mild right ventricular hypertrophy.     Atria  Both atria appear normal.        Mitral Valve  Trace mitral insufficiency is present.     Aortic Valve  The aortic valve is tricuspid. Mild aortic valve calcification is present.     Tricuspid Valve  Trace tricuspid insufficiency is present. The peak velocity of the tricuspid  regurgitant jet is not obtainable.     Pulmonic Valve  The pulmonic valve is normal.     Vessels  The aorta root is normal. IVC diameter <2.1 cm collapsing >50% with sniff  suggests a normal RA pressure of 3 mmHg.     Pericardium  No pericardial effusion is present.     _____________________________________________________________________________  __  MMode/2D Measurements & Calculations  IVSd: 1.5 cm  LVIDd: 4.3 cm  LVIDs: 2.8 cm  LVPWd: 1.1 cm     FS: 34.9 %  LV mass(C)d: 213.8 grams  LV mass(C)dI: 129.8 grams/m2  Ao root diam: 3.2 cm  LVOT diam: 2.2 cm  LVOT area: 3.8 cm2  LA Volume (BP): 38.4 ml  LA Volume Index (BP): 23.3 ml/m2  RWT: 0.53        Doppler Measurements & Calculations  MV E max alberto: 69.4 cm/sec  MV A max alberto: 75.2 cm/sec  MV E/A: 0.92     MV dec slope: 210.0 cm/sec2  MV dec time: 0.33 sec  PA V2 max: 111.0 cm/sec  PA max P.9 mmHg  PA acc time: 0.12 sec  E/E' avg: 15.8  Lateral E/e': 16.0  Medial E/e': 15.6     _____________________________________________________________________________  __           Report approved by: Enrique Barnett 2019 12:30 PM       CT Chest Abdomen Pelvis w/o Contrast    Narrative    EXAMINATION: CT CHEST ABDOMEN PELVIS W/O CONTRAST  11/23/2019 2:53 PM       CLINICAL HISTORY: Sepsis    COMPARISON: CT chest 5/25/2019, CT pelvis 4/12/2018        PROCEDURE COMMENTS: CT of the chest, abdomen, and pelvis was performed  withou intravenous and oral contrast. Axial MIP  images of the chest,  and coronal and sagittal reformatted images of the chest, abdomen, and  pelvis obtained.    FINDINGS:    Support devices: Tip of the right-sided double-lumen central venous  catheter in the cavoatrial junction. Proximal left subclavian artery  stent.    Chest: Normal heart size. No significant pericardial effusion. Few  prominent mediastinal lymph nodes without lymphadenopathy. Trachea  midline. Scattered micronodules in tree-in-bud opacities in the right  middle lobe and right greater than left upper lobes. Mild reticulation  in the peripheral right lower and left lower lobes. Mild linear  scarring in the lingula, medial right middle lobe, and posterolateral  right upper lobe. Dependent atelectasis. No focal consolidation. Trace  residual pleural effusions. Right lower lobe predominant bronchial  wall thickening and mucous plugging.    Abdomen/pelvis:  Normal liver. Cholecystectomy. Atrophic pancreas. Lobulated fluid  attenuation medial to the spleen near the pancreatic tail measuring up  to 3.2 cm (series 5 image 298), similar to 5/25/2019. Surgical changes  of left nephrectomy. Moderate perinephric fat stranding. No  hydronephrosis, hydroureter, or urinary tract stone. Several vascular  calcifications are noted about the distal right ureter. Urinary  bladder is decompressed with diffuse bilateral thickening.    Small hiatal hernia. Distended stomach. Small and large bowels are  nonobstructed. Appendectomy, hysterectomy, and bilateral  salpingooophorectomy. No pneumoperitoneum no abdominal ascites.    Soft tissue/bones: Advanced degenerative changes in the  L5-S1 with  posterior disc bulge. Severe osteopenic bony appearance.      Impression    IMPRESSION:  1. Upper lung predominant micronodules and scattered tree-in-bud  opacities, atypical infection versus respiratory bronchiolitis  (smoking related injury) versus, less likely, hypersensitivity  pneumonitis. Associated right lower lobe predominant bronchial wall  thickening and mucous plugging. Near resolution of the previously seen  pleural effusions on 5/25/2019 CT.  2. Lobulated fluid attenuation medial to the spleen is grossly  unchanged compared to 5/25/2019 and favored benign given stability.  Limited evaluation secondary to lack of IV contrast. Differential  includes a lymphangioma. This could be further evaluated with MRI on a  nonemergent basis, if clinically indicated.  3. Moderate nonspecific right perinephric fat stranding. Correlate for  urinary tract infection/pyelonephritis. No appreciable urinary tract  stone or hydronephrosis.    I have personally reviewed the examination and initial interpretation  and I agree with the findings.    RADHA LOYD, DO

## 2019-11-24 NOTE — PLAN OF CARE
ICU End of Shift Summary. See flowsheets for vital signs and detailed assessment.    Changes this shift: BP variable and soft overnight, has been mainly 90s/40s, with MAPs ranging widely from 60-70. Best pressures obtained on left calf. Remains asymptomatic, is alert and oriented. HR 40s overnight. MD aware, updated regarding vitals several times overnight. Per verbal order from MICU team, ok with MAPs >60. Other vitals WNL. Voided x1. Has now transferred off of MICU to [facility] service. Has transfer orders to .     Plan: Continue to monitor, continue plan of care. Notify team of changes. Transfer to 6b when bed is available.

## 2019-11-25 ENCOUNTER — APPOINTMENT (OUTPATIENT)
Dept: ULTRASOUND IMAGING | Facility: CLINIC | Age: 74
DRG: 193 | End: 2019-11-25
Attending: INTERNAL MEDICINE
Payer: COMMERCIAL

## 2019-11-25 ENCOUNTER — PATIENT OUTREACH (OUTPATIENT)
Dept: GERIATRIC MEDICINE | Facility: CLINIC | Age: 74
End: 2019-11-25

## 2019-11-25 ENCOUNTER — APPOINTMENT (OUTPATIENT)
Dept: OCCUPATIONAL THERAPY | Facility: CLINIC | Age: 74
DRG: 193 | End: 2019-11-25
Attending: STUDENT IN AN ORGANIZED HEALTH CARE EDUCATION/TRAINING PROGRAM
Payer: COMMERCIAL

## 2019-11-25 ENCOUNTER — APPOINTMENT (OUTPATIENT)
Dept: PHYSICAL THERAPY | Facility: CLINIC | Age: 74
DRG: 193 | End: 2019-11-25
Attending: STUDENT IN AN ORGANIZED HEALTH CARE EDUCATION/TRAINING PROGRAM
Payer: COMMERCIAL

## 2019-11-25 LAB
ANION GAP SERPL CALCULATED.3IONS-SCNC: 11 MMOL/L (ref 3–14)
BUN SERPL-MCNC: 44 MG/DL (ref 7–30)
CALCIUM SERPL-MCNC: 7.7 MG/DL (ref 8.5–10.1)
CHLORIDE SERPL-SCNC: 107 MMOL/L (ref 94–109)
CO2 SERPL-SCNC: 19 MMOL/L (ref 20–32)
CREAT SERPL-MCNC: 6.54 MG/DL (ref 0.52–1.04)
ERYTHROCYTE [DISTWIDTH] IN BLOOD BY AUTOMATED COUNT: 18.6 % (ref 10–15)
GFR SERPL CREATININE-BSD FRML MDRD: 6 ML/MIN/{1.73_M2}
GLUCOSE BLDC GLUCOMTR-MCNC: 105 MG/DL (ref 70–99)
GLUCOSE BLDC GLUCOMTR-MCNC: 107 MG/DL (ref 70–99)
GLUCOSE BLDC GLUCOMTR-MCNC: 134 MG/DL (ref 70–99)
GLUCOSE BLDC GLUCOMTR-MCNC: 91 MG/DL (ref 70–99)
GLUCOSE BLDC GLUCOMTR-MCNC: 98 MG/DL (ref 70–99)
GLUCOSE SERPL-MCNC: 107 MG/DL (ref 70–99)
HCT VFR BLD AUTO: 33.4 % (ref 35–47)
HGB BLD-MCNC: 10.3 G/DL (ref 11.7–15.7)
INTERPRETATION ECG - MUSE: NORMAL
MCH RBC QN AUTO: 30.3 PG (ref 26.5–33)
MCHC RBC AUTO-ENTMCNC: 30.8 G/DL (ref 31.5–36.5)
MCV RBC AUTO: 98 FL (ref 78–100)
MRSA DNA SPEC QL NAA+PROBE: NEGATIVE
PLATELET # BLD AUTO: 208 10E9/L (ref 150–450)
POTASSIUM SERPL-SCNC: 4.7 MMOL/L (ref 3.4–5.3)
RBC # BLD AUTO: 3.4 10E12/L (ref 3.8–5.2)
SODIUM SERPL-SCNC: 138 MMOL/L (ref 133–144)
SPECIMEN SOURCE: NORMAL
VANCOMYCIN SERPL-MCNC: 19.5 MG/L
WBC # BLD AUTO: 8.4 10E9/L (ref 4–11)

## 2019-11-25 PROCEDURE — 87640 STAPH A DNA AMP PROBE: CPT | Performed by: STUDENT IN AN ORGANIZED HEALTH CARE EDUCATION/TRAINING PROGRAM

## 2019-11-25 PROCEDURE — 97161 PT EVAL LOW COMPLEX 20 MIN: CPT | Mod: GP

## 2019-11-25 PROCEDURE — 97530 THERAPEUTIC ACTIVITIES: CPT | Mod: GO

## 2019-11-25 PROCEDURE — 97535 SELF CARE MNGMENT TRAINING: CPT | Mod: GO

## 2019-11-25 PROCEDURE — 97530 THERAPEUTIC ACTIVITIES: CPT | Mod: GP

## 2019-11-25 PROCEDURE — 99233 SBSQ HOSP IP/OBS HIGH 50: CPT | Performed by: STUDENT IN AN ORGANIZED HEALTH CARE EDUCATION/TRAINING PROGRAM

## 2019-11-25 PROCEDURE — 25000132 ZZH RX MED GY IP 250 OP 250 PS 637: Performed by: STUDENT IN AN ORGANIZED HEALTH CARE EDUCATION/TRAINING PROGRAM

## 2019-11-25 PROCEDURE — 80048 BASIC METABOLIC PNL TOTAL CA: CPT | Performed by: STUDENT IN AN ORGANIZED HEALTH CARE EDUCATION/TRAINING PROGRAM

## 2019-11-25 PROCEDURE — 25000128 H RX IP 250 OP 636: Performed by: STUDENT IN AN ORGANIZED HEALTH CARE EDUCATION/TRAINING PROGRAM

## 2019-11-25 PROCEDURE — 36415 COLL VENOUS BLD VENIPUNCTURE: CPT | Performed by: STUDENT IN AN ORGANIZED HEALTH CARE EDUCATION/TRAINING PROGRAM

## 2019-11-25 PROCEDURE — 87641 MR-STAPH DNA AMP PROBE: CPT | Performed by: STUDENT IN AN ORGANIZED HEALTH CARE EDUCATION/TRAINING PROGRAM

## 2019-11-25 PROCEDURE — 97165 OT EVAL LOW COMPLEX 30 MIN: CPT | Mod: GO

## 2019-11-25 PROCEDURE — 12000001 ZZH R&B MED SURG/OB UMMC

## 2019-11-25 PROCEDURE — 80202 ASSAY OF VANCOMYCIN: CPT | Performed by: STUDENT IN AN ORGANIZED HEALTH CARE EDUCATION/TRAINING PROGRAM

## 2019-11-25 PROCEDURE — 93990 DOPPLER FLOW TESTING: CPT

## 2019-11-25 PROCEDURE — 00000146 ZZHCL STATISTIC GLUCOSE BY METER IP

## 2019-11-25 PROCEDURE — 85027 COMPLETE CBC AUTOMATED: CPT | Performed by: STUDENT IN AN ORGANIZED HEALTH CARE EDUCATION/TRAINING PROGRAM

## 2019-11-25 RX ORDER — AZITHROMYCIN 250 MG/1
250 TABLET, FILM COATED ORAL DAILY
Status: COMPLETED | OUTPATIENT
Start: 2019-11-26 | End: 2019-11-27

## 2019-11-25 RX ORDER — CEFTRIAXONE 2 G/1
2 INJECTION, POWDER, FOR SOLUTION INTRAMUSCULAR; INTRAVENOUS EVERY 24 HOURS
Status: COMPLETED | OUTPATIENT
Start: 2019-11-25 | End: 2019-11-25

## 2019-11-25 RX ORDER — AMOXICILLIN AND CLAVULANATE POTASSIUM 500; 125 MG/1; MG/1
1 TABLET, FILM COATED ORAL
Status: COMPLETED | OUTPATIENT
Start: 2019-11-26 | End: 2019-11-27

## 2019-11-25 RX ADMIN — UMECLIDINIUM 1 PUFF: 62.5 AEROSOL, POWDER ORAL at 07:55

## 2019-11-25 RX ADMIN — CEFTRIAXONE SODIUM 2 G: 2 INJECTION, POWDER, FOR SOLUTION INTRAMUSCULAR; INTRAVENOUS at 16:25

## 2019-11-25 RX ADMIN — POLYETHYLENE GLYCOL 3350 17 G: 17 POWDER, FOR SOLUTION ORAL at 16:35

## 2019-11-25 RX ADMIN — FLUTICASONE FUROATE AND VILANTEROL TRIFENATATE 1 PUFF: 200; 25 POWDER RESPIRATORY (INHALATION) at 07:55

## 2019-11-25 RX ADMIN — AZITHROMYCIN 250 MG: 250 TABLET, FILM COATED ORAL at 07:55

## 2019-11-25 RX ADMIN — ATORVASTATIN CALCIUM 40 MG: 40 TABLET, FILM COATED ORAL at 22:05

## 2019-11-25 RX ADMIN — LEVOTHYROXINE SODIUM 200 MCG: 0.03 TABLET ORAL at 07:55

## 2019-11-25 RX ADMIN — SENNOSIDES 8.6 MG: 8.6 TABLET, FILM COATED ORAL at 16:35

## 2019-11-25 ASSESSMENT — MIFFLIN-ST. JEOR: SCORE: 1139.09

## 2019-11-25 ASSESSMENT — ACTIVITIES OF DAILY LIVING (ADL)
ADLS_ACUITY_SCORE: 17
ADLS_ACUITY_SCORE: 18
ADLS_ACUITY_SCORE: 18

## 2019-11-25 NOTE — PROGRESS NOTES
"CLINICAL NUTRITION SERVICES - ASSESSMENT NOTE     Nutrition Prescription    RECOMMENDATIONS FOR MDs/PROVIDERS TO ORDER:  1. Recommend liberalizing diet as able to promote increase nutritional intake.   2. Recommend ordering Nephrocaps daily to help meet micronutrient needs with dialysis    Malnutrition Status:    Severe malnutrition in the context of chronic illness    Recommendations already ordered by Registered Dietitian (RD):  Boost Plus BID  Room Service with assistance ordering meals     Future/Additional Recommendations:  Monitor lytes, if lower lyte supplement needed, recommend ordering Nepro instead of Boost Plus.     REASON FOR ASSESSMENT  Kim Anne is a/an 74 year old female assessed by the dietitian for Admission Nutrition Risk Screen for reduced oral intake over the last month    NUTRITION HISTORY  Per chart review, pt with h/o COPD, kidney donation in 1998, ESRD (HD on MWF), DMT2, and HTN.    Per discussion with pt, she has had a poor appetite for a while. She was unable to describe how long she has had a reduced appetite, nor describe what foods she eats at home. When asked is she follows a low sodium, phos or potassium diet at home, she replied that she didn't know what that meant. Pt states she has had Boost Plus supplements in the past and enjoyed them.     CURRENT NUTRITION ORDERS  Diet: Dialysis  Intake/Tolerance: Pt reports having a poor appetite, and only able to eat tomato soup today. When asked what she had to eat yesterday, pt replied \"don't remember.\" Nursing notes pt with 100% intake of one meal on 11/23, and 25% intake of one meal on 11/24.  Pt states that she finds it difficult to order while being on the Dialysis diet, and states that she would like someone to help her order her meals.     LABS  Labs reviewed  K+ 4.7 (WNL) on 11/25    MEDICATIONS  Medications reviewed    ANTHROPOMETRICS  Height: 165.1 cm (5' 5\")  Most Recent Weight: 63.1 kg (139 lb 1.8 oz)    IBW: 56.8 " kg  BMI: Normal BMI  Weight History: No weight loss per documented weights. Pt didn't know what her weight was or if she had lost weight.   Wt Readings from Last 10 Encounters:   11/24/19 63.1 kg (139 lb 1.8 oz)   11/22/19 58.9 kg (129 lb 13.6 oz)   11/20/19 59.3 kg (130 lb 11.2 oz)   09/16/19 57.6 kg (126 lb 14.4 oz)   09/06/19 58.5 kg (129 lb)   09/04/19 58.5 kg (129 lb)   08/27/19 57.2 kg (126 lb)   08/21/19 57.4 kg (126 lb 8 oz)   08/16/19 57 kg (125 lb 9.6 oz)   08/06/19 57.6 kg (127 lb)     Dosing Weight: 59 kg (lowest/driest recent wt)    ASSESSED NUTRITION NEEDS  Estimated Energy Needs: 6274-6276 kcals/day (25 - 30 kcals/kg)  Justification: Maintenance  Estimated Protein Needs: 59-71 grams protein/day (1 - 1.2 grams of pro/kg)  Justification: Dialysis  Estimated Fluid Needs: 2186-9038 mL/day (1 mL/kcal)   Justification: Maintenance (or per MD pending fluid restriction)    PHYSICAL FINDINGS  See malnutrition section below.    MALNUTRITION  % Intake: Unable to assess PTA intake (but suspect lower than 75% of needs PTA given pt reported reduced appetite)  % Weight Loss: None noted  Subcutaneous Fat Loss: Facial region, Upper arm and Lower arm: Moderate  Muscle Loss: Temporal, Facial & jaw region:  Mild; Scapular bone, Thoracic region (clavicle, acromium bone, deltoid, trapezius, pectoral), Upper arm (bicep, tricep), Lower arm  (forearm):, Dorsal hand:  Moderate, Upper leg (quadricep, hamstring), Patellar region and Posterior calf:  Severe  Fluid Accumulation/Edema: Does not meet criteria  Malnutrition Diagnosis: Severe malnutrition in the context of chronic illness    NUTRITION DIAGNOSIS  Inadequate oral intake related to poor appetite as evidenced by pt intake of only tomato soup today, 25% of one small meal yesterday, and severe muscle depletion.       INTERVENTIONS  Implementation  - Nutrition Education: Provided education on importance of adequate nutritional intake, especially protein for preservation of  LBM. Discussed supplements available to pt to help meet needs. Discussed having a nutrition associate help pt order to optimize intake.      - Medical food supplement therapy    Goals  Patient to consume % of nutritionally adequate meal trays TID, or the equivalent with supplements/snacks.     Monitoring/Evaluation  Progress toward goals will be monitored and evaluated per protocol.    Elizabet Gonzalez RD, LD  Pager: [phone]

## 2019-11-25 NOTE — PROGRESS NOTES
Medicine Gold Crosscover Note    Was called to patient's bedside due to worsening pain and swelling of her right arm.  She noticed it over the last couple of hours.  No fevers, chills, sweats, difficulty breathing, chest pain or other symptoms.  On exam she had significant swelling, and mild redness of the medial aspect of her right arm adjacent to her surgical site. The incision appeared clean and was not draining. Recently placed AVF had a good thrill.  Per patient she had mild tingling of her fingers a few hours ago, but this has resolved.  Her right hand was warm with a palpable radial pulse and no numbness or weakness.    # Right arm pain/swelling  - Unclear whether this is new or represents a post operative reaction or new infection.  Given presentation of hypotension and recent narrowing of antibiotics, will restart IV vancomycin and readdress in the AM.  Fistula ultrasound ordered for the AM.  If concerns about a surgical sight infection persist, may need to consult surgery.      Flakito Leigh 11/25/2019 1:00 AM.

## 2019-11-25 NOTE — PROVIDER NOTIFICATION
Provider notified that per pharmacy, pt's vanco level is therapeutic and does not recommend vanco until next dialysis run.

## 2019-11-25 NOTE — PROGRESS NOTES
"                              Internal Medicine Progress Note - Gold Service  Kim Anne MRN: [id number]  Age: 74 year old, : 1945  Date: 2019         Interval History:     Swelling around the arm noted overnight. Improved towards the AM. Otherwise no new complaints.      Last 24 hr care team notes reviewed.     ROS:  4 point ROS including Respiratory, CV, GI and , is negative.     PHYSICAL EXAM    Vital signs:  Temp: 98  F (36.7  C) Temp src: Oral BP: 116/44 Pulse: 60 Heart Rate: 57 Resp: 16 SpO2: 92 % O2 Device: None (Room air) Oxygen Delivery: 2 LPM Height: 165.1 cm (5' 5\") Weight: 63.1 kg (139 lb 1.8 oz)  Estimated body mass index is 23.15 kg/m  as calculated from the following:    Height as of this encounter: 1.651 m (5' 5\").    Weight as of this encounter: 63.1 kg (139 lb 1.8 oz).      Intake/Output Summary (Last 24 hours) at 2019 0821  Last data filed at 2019 0700  Gross per 24 hour   Intake 910 ml   Output --   Net 910 ml     GEN: NAD, AAox3  HEENT: NC/AT , well injected conjunctiva, EOMI  Neck: trachea midline, no JVD  CV: RRR, nl S1/S2 no mumurs  PULM: bibasilar inspiratory rales   Abdomen: soft, non tender/distented , BS +   EXTREMITIES: warm, no pedal edema  SKIN: No rashes  NEURO: MS: AAOx3  - No focal deficit     DIAGNOSTIC STUDIES  I reviewed all medications, laboratory results, and imaging over the past 24 hours. Notable for;   ROUTINE LABS:   WBC 8.4     MICROBIOLOGY  Blood culture  NGTD   Blood culture  NGTD   Blood culture  NGTD     IMAGING  None     Assessment and Plan:     Date of admission 2019  Ms Anne is a 74yo woman with a PMH of solitary R kidney, ESRD on HD TThSa, COPD, DMII, non obstructive CAD, A.tach/PSVT, and hypothyroidism who presents from HD with concerns of hypotension. She was in HD today when she felt SOB and was found to be hypotensive. Beta blocker unintentional overdose vs septic shock. She initially " presented to the MICU and transferred to medicine on 11/24.    #Plan for today:  - PT/OT evaluation  - Change to oral Abx   - US AVF     # Hypotension 2/2 beta blocker toxicity vs severe sepsis   #CAP   #Leukocytosis   Patient with persistent bradycardia and hypotension despite fluid resuscitation in the ED. EKG on admission showing A.Tach with bradycardia. She noted taking too much coreg and has approximately 4 pills unaccounted for. She was challenged with Glucagon with a slight increase in HR. Empirically started on Vancomycin + Zosyn for unclear source of infection    Infectious work up including CT chest/abd pelvis pertinent for tree in bud opacities c/w atypical infection. Will transition to b-lactam and azithromycin for CAP therapy.    - Hold home coreg  - s/p 5mg glucagon for beta blocker toxicity with improvement   - Ceftriaxone + Azithromycin ---> Augmentin + Azithromycin to finish 5 days (end date 11/27)  - Negative flu swab   - Blood cultures with NGTD      #Right arm swelling   Noted on 11/24 overnight. No evidence of infection. No history of trauma.   US fistula obtained and showing tripling of velocities across cephalic system. Will discuss with Nephrology.     # History of COPD   - Continue home albuterol, Advair and Tiotropium     #History of PSVT/A.Tach   Seen by Dr. Cool on 9/2019 where she was transitioned from diltiazem to Carvedilol. If BP continues to be an issue we can transition back to CCB or Metoprolol.   - Hold Coreg for now    #Non obstructive CAD   #History of A.Tach   - Ap: Aspirin 81 mg   - Statin: atorvastatin 40 mg   - BB: holding coreg for now   - EF>35%, no need for ACE/ARB, Ald ant, SCD ppx       # S/P LDKT 1988  # ESRD 2/2 Diabetic Nephropathy on HD TTh  - Nephrology consult placed for management of HD  - Access: Tunneled subclavian       #Hypothyroidism   Patient with a h/o hypothyroidism, TSH checked on admission was 83 with T4 0.54. Was noted in her med rec that she has not  been taking her Levothyroxine. No clinical signs of myxedema.   - Restart home dose Levothyroxine  - Should follow up with Endocrinology at discharge      #DMII  H/o DMII c/b nephropathy. Most recent A1C 5.2 on 5/1/19. No current medications for diabetes.   - Continue to monitor BG      Consulting Services:   Nephrology     Diet: Regular   VTE prophylaxis: SCDs   Family: Not updated  PT/OT: Y/Y  Code status: Full    Disposition: 1-2 days pending safe TCU placement.     Lines:  Subclavian line   PIVs     Patient care plan discussed beside with patient, RN.   Vickie Singer MD  Internal Medicine Hospitalist & Staff Physician  [facility]  Pager: [phone]    Team: Lorena Hooper   Page Cross Cover between 5pm-8am: pager [phone] (Rufus), if no response then page job code [phone].

## 2019-11-25 NOTE — PROGRESS NOTES
"Social Work: Assessment with Discharge Plan    Patient Name:  Kim Anne  :  1945  Age:  74 year old  MRN:  [id number]  Completed assessment with:  Patient    Presenting Information   Reason for Referral:  Discharge plan  Date of Intake:  2019  Referral Source:  Chart Review  Decision Maker:  Unknown if pt can be own decision-maker- MOCA score of 8 today  Alternate Decision Maker:  Per HCD on file- pt's son Ki (ph [phone]) is primary agent and daughter April (ph [phone]) secondary agent  Health Care Directive:  Copy in Chart  Living Situation:  House  Previous Functional Status:  Unsure, pt unable to answer some questions- pt has walker at bedside that she states is hers from home  Patient and family understanding of hospitalization:  Not discussed  Cultural/Language/Spiritual Considerations:  None identified  Adjustment to Illness:  Pt poor historian and unable to answer several questions about her history and current medical care    Physical Health  Reason for Admission:    1. Hypotension, unspecified hypotension type    2. ESRD (end stage renal disease) on dialysis (H)    3. Acute on chronic respiratory failure with hypoxia (H)    4. Cigarette smoker      Services Needed/Recommended:  TCU    Mental Health/Chemical Dependency  Diagnosis:  None noted  Support/Services in Place:  None  Services Needed/Recommended:  NA    Support System  Significant relationship at present time:  None identified  Family of origin is available for support:  Pt states she lives with her son Ki, daughter, and grandchildren. Asked if family is a good support for her at home and pt stated \"I don't know.\"  Other support available:  None identified  Gaps in support system:  Unclear how much support pt has at home  Patient is caregiver to:  None     Provider Information   Primary Care Physician:  Ailyn Nieves   [phone]   Clinic:  [address] [place] [id number]      Case " "Manager:  Unknown    Financial   Income Source:  Not discussed  Financial Concerns:  None identified  Insurance:    Payor/Plan Subscriber Name Rel Member # Group #   UCARE - UCARE-SENIORS* JONEL CHAVEZ*  [id number] [facility]      [address]       Discharge Plan   Patient and family discharge goal:  TCU  Provided education on discharge plan:  YES  Patient agreeable to discharge plan:  YES  A list of Medicare Certified Facilities with associated star ratings was provided to the patient and/or family to encourage patient choice. Patient's choices for facility are:  Pt requests referral to [facility]  Will NH provide Skilled rehabilitation or complex medical:  YES  General information regarding anticipated insurance coverage and possible out of pocket cost was discussed. Patient and patient's family are aware patient may incur the cost of transportation to the facility, pending insurance payment: YES  Barriers to discharge:  Medical stability    Discharge Recommendations   Anticipated Disposition:  Facility:  TCU  Transportation Needs:  TBD  Name of Transportation Company and Phone:  TBD    Additional comments   Pt is a 74-year-old female admitted to [facility] 11/23/19 from HD with concerns of hypotension. TCU is recommended at discharge. Met with pt to discuss. Pt immediately agreeable to TCU placement and states it sounds like a good idea. Pt unable to answer questions about her support at home and often answered \"I don't know\" to questions. Per nephrology note, pt dialyzes at [facility]- asked pt to confirm this and she states she dialyzes on [address], but is unable to answer [facility] (appears pt referring to [facility] clinic). Asked pt how she gets to and from dialysis and pt stated \"I don't know.\" Provided pt list of TCUs and she looked at list and identified [facility] as facility she would like to discharge to.    Pt's son Ki listed on [facility] on file. Asked " "pt if Ki can be called to discuss discharge plan. Pt stated \"if he cares.\" Asked pt if her family is a good support for her at home and she stated \"I don't know.\" Asked pt how things were going at home and she said \"good.\"    Called pt's son Ki (ph [phone])- left [facility] for callback.    Faxed referral to [facility] per pt request:  1) [facility] ( [phone])    ANA Valdez, [facility]    [facility]  Pager [phone]  Phone [phone]      "

## 2019-11-25 NOTE — CONSULTS
Transplant Surgery  Consult Note  11/25/2019    Assessment & Plan:   Kim Anne is a left handed 74 year old female who donated her left kidney to there brother in 1988. She also has a complex past medical history including hypertension, diabetes mellitus, COPD, SVT, dyslipidemia and multiple abdominal operations. She underwent a radio-cephalic AV fistula creation on 11/22/2019. She was admitted the following day with hypotension and bradycardia during dialysis.      -continue cares per primary team   -will continue to monitor AV fistula for signs of infection  -the Transplant Surgery team will continue to see the patient peripherally      Medical Decision Making: Low    JILLIAN/Fellow/Resident Provider: Massimo Lamb MD    Faculty: Susana Allen M.D.    I have reviewed history, examined patient and discussed plan with the fellow/resident/JILLIAN.  I concur with the findings in this note.             _________________________________________________________________    History: History is obtained from the patient    Kim Anne is a left handed 74 year old female who donated her left kidney to there brother in 1988. She also has a complex past medical history including hypertension, diabetes mellitus, COPD, SVT, dyslipidemia and multiple abdominal operations. She has had biopsy proven diabetic nephropathy from 02/2015 resulting in chronic kidney failure. She has been on hemodialysis starting 5/2/19 using a tunneled line. She underwent a radio-cephalic AV fistula creation on 11/22/2019. She was admitted the following day with hypotension and bradycardia during dialysis. Upon further investigation, she was unsure if she took too much of her Coreg. Since admission, she continues to feel better. She denies any symptoms. Her AV fistula site has minimal swelling and some bruising noted.          ROS:   A 10-point review of systems was negative except as noted above.    Meds:    [START ON 11/26/2019]  "amoxicillin-clavulanate  1 tablet Oral Q24H LUIS     atorvastatin  40 mg Oral At Bedtime     [START ON 11/26/2019] azithromycin  250 mg Oral Daily     fluticasone-vilanterol  1 puff Inhalation Daily     glucagon  1 mg Intravenous Once     levothyroxine  200 mcg Oral Daily     sodium chloride (PF)  3 mL Intracatheter Q8H     umeclidinium  1 puff Inhalation Daily     vancomycin place wolfe - receiving intermittent dosing  1 each Intravenous See Admin Instructions       Physical Exam:     Admit Weight: 59 kg (130 lb)    Current vitals:   /42 (BP Location: Right leg)   Pulse 58   Temp 98.4  F (36.9  C) (Oral)   Resp 16   Ht 1.651 m (5' 5\")   Wt 63.8 kg (140 lb 11.2 oz)   SpO2 96%   BMI 23.41 kg/m      Vital sign ranges:    Temp:  [97.7  F (36.5  C)-98.4  F (36.9  C)] 98.4  F (36.9  C)  Pulse:  [58-60] 58  Heart Rate:  [57-59] 57  Resp:  [14-16] 16  BP: (113-129)/(40-45) 129/42  SpO2:  [92 %-96 %] 96 %    General Appearance: in no apparent distress.   Skin: normal, warm   Heart: irregular rate and rhythm  Lungs: non-labored breathing on room air  Abdomen: The abdomen is soft, non-tender, non-distended  Extremities: RUE AV fistula with good thrill and bruit noted, non-blanching erythema noted, no tenderness noted    Data:   CMP  Recent Labs   Lab 11/25/19  0813 11/24/19  0335 11/23/19  1003    139 135   POTASSIUM 4.7 4.6 3.9   CHLORIDE 107 106 101   CO2 19* 24 26   * 133* 129*   BUN 44* 36* 21   CR 6.54* 4.98* 3.73*   GFRESTIMATED 6* 8* 11*   GFRESTBLACK 7* 9* 13*   FRANCES 7.7* 7.8* 8.7   MAG  --  1.9 1.9   ALBUMIN  --   --  3.2*   BILITOTAL  --   --  0.4   ALKPHOS  --   --  139   AST  --   --  25   ALT  --   --  25     CBC  Recent Labs   Lab 11/25/19  0813 11/24/19  0335   HGB 10.3* 10.0*   WBC 8.4 7.7    182     "

## 2019-11-25 NOTE — PLAN OF CARE
PT -   Discharge Planner PT   Patient plan for discharge: home with assist from family  Current status: Evaluation completed and treatment initiated. Pt is confused and is poor historian in providing information on PLOF. Pt is mod I for bed mobility using bed rail. Pt performs sit<>stand transfers with 4WW with supervision for safety. Verbal cues provided for safe hand placement and safety with brake management. Pt gets agitated with therapist for providing cues and with attempting to put on gait belt. Pt ambulates in hallway ~75ft with 4WW and SBA, antalgic gait d/t LE pain. Pt is SBA to/from bathroom with 4WW.  Barriers to return to prior living situation: medical status, cognition, decreased safety awareness, home set up  Recommendations for discharge: TCU  Rationale for recommendations: Pt provides different information on assist from family at home. Pt is below baseline and demonstrates decreased safety awareness with functional mobility. Pt will benefit from continued rehab in order to progress activity tolerance, strength, balance and safety/independence with functional mobility.  Entered by: Maggie Wallace 11/25/2019 3:31 PM

## 2019-11-25 NOTE — PROGRESS NOTES
11/25/19 1500   Quick Adds   Type of Visit Initial PT Evaluation   Living Environment   Lives With child(dari), adult   Living Arrangements house   Home Accessibility stairs to enter home;stairs within home   Number of Stairs, Main Entrance other (see comments)  (15)   Number of Stairs, Within Home, Primary other (see comments)  (flight)   Transportation Anticipated family or friend will provide   Living Environment Comment Pt is poor historian. Provides different answer than what was given to OT earlier. Pt changed answers when questions were asked again   Self-Care   Usual Activity Tolerance fair   Current Activity Tolerance poor   Regular Exercise No   Equipment Currently Used at Home walker, rolling   Functional Level Prior   Ambulation 1-->assistive equipment   Transferring 1-->assistive equipment   Toileting 0-->independent   Bathing 0-->independent   Communication 0-->understands/communicates without difficulty   Swallowing 0-->swallows foods/liquids without difficulty   Cognition 1 - attention or memory deficits   Fall history within last six months no   Which of the above functional risks had a recent onset or change? ambulation;transferring;toileting;bathing;dressing   General Information   Onset of Illness/Injury or Date of Surgery - Date 11/23/19   Referring Physician Leah Mckeon MD   Patient/Family Goals Statement To go home   Pertinent History of Current Problem (include personal factors and/or comorbidities that impact the POC) Per chart, Ms Anne is a 74yo woman with a PMH of solitary R kidney, ESRD on HD TThSa, COPD, DMII, non obstructive CAD, A.tach/PSVT, and hypothyroidism who presents from HD with concerns of hypotension. She was in HD today when she felt SOB and was found to be hypotensive. Beta blocker unintentional overdose vs septic shock. She initially presented to the MICU and transferred to medicine on 11/24.   Precautions/Limitations fall precautions   Cognitive Status  "Examination   Orientation person;place   Level of Consciousness lethargic/somnolent   Follows Commands and Answers Questions 100% of the time   Personal Safety and Judgment intact   Memory intact   Pain Assessment   Patient Currently in Pain Yes, see Vital Sign flowsheet   Posture    Posture Forward head position;Protracted shoulders   Range of Motion (ROM)   ROM Comment WFL   Strength   Strength Comments Deconditioned, grossly 4-/5   Bed Mobility   Bed Mobility Comments mod I   Transfer Skills   Transfer Comments supervision for safety concerns   Gait   Gait Comments SBA   Balance   Balance Comments not formally assessed   General Therapy Interventions   Planned Therapy Interventions ADL retraining;IADL retraining;balance training;bed mobility training;gait training;neuromuscular re-education;strengthening;transfer training;progressive activity/exercise   Clinical Impression   Criteria for Skilled Therapeutic Intervention yes, treatment indicated   PT Diagnosis Impaired functional mobility   Influenced by the following impairments weakness, decreased activity tolerance, cognition, pain   Functional limitations due to impairments ambulation, transfers, ADLs/IADLs, stairs   Clinical Presentation Stable/Uncomplicated   Clinical Presentation Rationale chart review and clinical judgment   Clinical Decision Making (Complexity) Low complexity   Therapy Frequency 5x/week   Predicted Duration of Therapy Intervention (days/wks) 5 days   Anticipated Discharge Disposition Transitional Care Facility   Risk & Benefits of therapy have been explained Yes   Patient, Family & other staff in agreement with plan of care Yes   [facility] AM-PAC  \"6 Clicks\" V.2 Basic Mobility Inpatient Short Form   1. Turning from your back to your side while in a flat bed without using bedrails? 4 - None   2. Moving from lying on your back to sitting on the side of a flat bed without using bedrails? 4 - None   3. Moving to and from a bed to a " chair (including a wheelchair)? 3 - A Little   4. Standing up from a chair using your arms (e.g., wheelchair, or bedside chair)? 3 - A Little   5. To walk in hospital room? 3 - A Little   6. Climbing 3-5 steps with a railing? 2 - A Lot   Basic Mobility Raw Score (Score out of 24.Lower scores equate to lower levels of function) 19   Total Evaluation Time   Total Evaluation Time (Minutes) 8

## 2019-11-25 NOTE — PROGRESS NOTES
Antimicrobial Stewardship Team Note    Antimicrobial Stewardship Program - A joint venture between [facility] and  Physicians to optimize antibiotic management.  NOT a formal consult - Restricted Antimicrobial Review     Patient: Kim Anne  MRN: [id number]  Allergies: Contrast dye; Clonidine; Diatrizoate; Diltiazem; and Iodine-131    Brief Summary: Kim Anne is a 73 year old woman with a history of solitary R kidney, ESRD on HD TThSa, COPD/emphysema, DMII, h/o MI, h/o tuberculosis s/p treatment in 1970s, hypothyroidism, recent creation of arteriovenous fistula for HD access 11/22, who presented from HD on 11/23 with concerns of hypotension, SBPs in the 60s, SOB. Hypotension persistent despite adequate fluid resuscitation so she was transferred to the MICU. She notes that she may have taken too may carvedilol pills. Pill count reveals possibly 4 pills unaccounted for. Slight response to glucagon challenge. Empiric antibiotics continued in the setting of possible septic shock (most likely source would be bacteremia from HD line) vs. unintentional beta blocker overdose.    WBC on admission was 10.2. Increased briefly to 12.1, now down to 7.7. Patient's blood pressure and heart rate are now normal; patient has continuously been afebrile with normal respiratory rate. Oxygen usually above 95%, did dip down to 92% at one point. Sputum gram stain positive for few mixed gram positive reyna. Sputum culture pending. Peripheral blood cultures, blood cultures from Blue and Red ports from 11/23 TD. Influenza A and B and RSV negative. CT scan showed no acute airspace opacity.         Active Anti-infective Medications   (From admission, onward)                Start     Stop    11/25/19 0800  azithromycin  250 mg,   Oral,   DAILY     CAP        --    11/25/19 0150  Vancomycin Place Dean - Receiving Intermittent Dosing  1 each,   Intravenous,   SEE ADMIN INSTRUCTIONS     Skin and Soft Tissue  Infection        --    11/24/19 1600  cefTRIAXone  2 g,   Intravenous,   EVERY 24 HOURS     CAP        --          Assessment: Unintentional Beta Blocker Overdose. From chart review, patient was treated for CAP due to shortness of breath, which can be attributed to her bradycardia and unintentional beta blocker overdose. Patient does not meet criteria for CAP. Her mild leukocytosis of 12.1 has resolved, she has been afebrile with no increase in oxygen requirements, her CT shows no acute signs of pneumonia, no positive sputum cultures (11/24 sputum cultures pending, gram stain positive only for few mixed Gram positive reyna). Patient does have a productive cough, but otherwise does not meet the requirements of CAP diagnosis. Recommend discontinuing azithromycin and ceftriaxone.    Possible right arm cellulitis s/p AVF 11/22. Area of concern for possible cellulitis is red and swollen. It is adjacent to fistula created 11/22. Given the absence of systemic symptoms noted (fevers, increased WBCs, etc.), would question if this is a true cellulitis. Patient has no history of MRSA, but no negative MRSA nares PCR either. Vancomycin is not recommended for simple SSTI, though may be more indicated if patient is MRSA colonized. If patient is not colonized, may narrow therapy to an oral antibiotic.    Recommendations:  1. Discontinue ceftriaxone and azithromycin  2. Recommend doing a MRSA nares PCR to determine if patient is colonized with MRSA. If positive, continue vancomycin for SSTI for a total of 5 days (end 11/27). If negative, de-escalate to cephalexin for treatment total of 5 days (end 11/27).    Medication Change: discontinue one or more antibiotic(s), de-escalate antibiotic regimen - Azithromycin, Ceftriaxone, Cephalexin, Vancomycin IV.  Recommended labs:  MRSA nares PCR    Pharmacy took the following actions: Called/paged provider, Sticky note reminder created, Electronic note created.    Discussed with ID Staff Sheri  MD Kaia; Allyssa Kaplan, PharmD    Anastasiia ArredondoD, MPH  PGY1 Pharmacy Practice Resident  Pager: [phone]  Phone: [phone]    Vital Signs/Clinical Features:  Vitals         11/23 0700  -  11/24 0659 11/24 0700  -  11/25 0659 11/25 0700  -  11/25 1454   Most Recent    Temp ( F) 97.7 -  98.8    96 -  98.8      98.4     98.4 (36.9)    Pulse 43 -  77      60      58     58    Heart Rate 44 -  83    50 -  59       57    Resp 7 -  30    14 -  18      16     16    BP 45/28 -  134/52    96/43 -  137/53      129/42     129/42    SpO2 (%) 88 -  100    92 -  100      96     96            Labs  Estimated Creatinine Clearance: 7.6 mL/min (A) (based on SCr of 6.54 mg/dL (H)).  Recent Labs   Lab Test 06/21/19  0610 11/20/19  1059 11/22/19  0832 11/23/19  1003 11/24/19  0335 11/25/19  0813   CR 2.33* 5.69* 5.19* 3.73* 4.98* 6.54*       Recent Labs   Lab Test 05/20/19  1942 05/21/19  1103  06/19/19  2209 06/21/19  0610 11/20/19  1059 11/22/19  0832 11/23/19  1003 11/24/19  0335 11/25/19  0813   WBC 8.5 8.7   < > 8.4 12.1* 10.0 10.2 12.1* 7.7 8.4   ANEU 6.8 7.2  --  5.7  --  7.5 8.2 10.6*  --   --    ALYM 1.0 0.9  --  1.9  --  1.7 1.2 0.8  --   --    JOSE 0.5 0.4  --  0.5  --  0.5 0.5 0.4  --   --    AEOS 0.1 0.1  --  0.2  --  0.2 0.2 0.2  --   --    HGB 7.9* 7.2*   < > 8.0* 7.4* 13.5 12.7 12.9 10.0* 10.3*   HCT 25.8* 24.6*   < > 25.9* 25.4* 42.2 40.1 41.5 32.7* 33.4*   MCV 93 97   < > 94 98 95 95 97 99 98    342   < > 333 307 259 242 235 182 208    < > = values in this interval not displayed.       Recent Labs   Lab Test 05/01/19  1940 05/20/19  1942 05/21/19  1103 05/22/19  0335 05/23/19  0445 06/19/19  2209 06/21/19  0610 11/23/19  1003   BILITOTAL 0.1* 0.3  --  0.1* 0.4 0.2  --  0.4   ALKPHOS 77 125  --  102 100 150  --  139   ALBUMIN 1.2* 1.4* 1.3* 1.2* 1.2* 2.0* 1.9* 3.2*   AST 21 21  --  16 18 20  --  25   ALT 17 12  --  10 10 17  --  25       Recent Labs   Lab Test 01/05/12 2218 01/06/12  0520 01/06/12  0919  12/22/14  1337 11/01/16  2335 11/02/16  0813 11/03/16  0545 05/21/19  1103 05/24/19  1606 05/29/19  0452 05/30/19  0445 11/23/19  1003 11/23/19  1013   PCAL  --   --   --  <0.05  <0.05    --  5.62 5.34 0.33  --  0.68 0.57  --   --    LACT 1.7 1.7 1.8  --  1.0  --   --   --  1.4  --   --   --  0.8   CRP  --   --   --   --   --   --   --   --   --  38.0*  --  8.9*  --    SED  --   --   --   --   --   --   --   --   --   --   --  26  --        Recent Labs   Lab Test 11/25/19  0238   VANCOMYCIN 19.5       Culture Results:  7-Day Micro Results       Procedure Component Value Units Date/Time    Methicillin Resistant Staph Aureus PCR     Order Status:  No result Lab Status:  No result     Specimen:  Nasal Swab     Sputum Culture Aerobic Bacterial [[id number]] Collected:  11/24/19 1021    Order Status:  Completed Lab Status:  Preliminary result Updated:  11/25/19 1218    Specimen:  Sputum      Specimen Description Sputum     Culture Micro Culture in progress    Gram stain [[id number]] Collected:  11/24/19 1021    Order Status:  Completed Lab Status:  Final result Updated:  11/24/19 1405    Specimen:  Sputum      Specimen Description Sputum     Special Requests Screen     Gram Stain <10 Squamous epithelial cells/low power field      >25 PMNs/low power field      Few  Mixed gram positive reyna      Influenza A and B and RSV PCR [[id number]] Collected:  11/24/19 1021    Order Status:  Completed Lab Status:  Final result Updated:  11/24/19 1144    Specimen:  Nares from Nasal Swab      Specimen Description Nares     Influenza A PCR Negative     Comment: Flu A target RNA not detected, presumed negative for Influenza A or the viral   load is below the limit of detection.          Influenza B PCR Negative     Comment: Flu B target RNA not detected, presumed negative for Influenza B or the viral   load is below the limit of detection.          Resp Syncytial Virus Negative     Comment: RSV target RNA not detected, presumed negative for  Respiratory Syncitial Virus   or the viral load is below the limit of detection.  FDA approved assay performed using [employer] GeneXpert(R) real-time PCR.         Blood culture [[id number]] Collected:  11/23/19 1425    Order Status:  Completed Lab Status:  Preliminary result Updated:  11/25/19 1349    Specimen:  Blood from Catheter      Specimen Description Blood Blue port DIALYSIS     Special Requests Aerobic and anaerobic bottles received     Culture Micro No growth after 2 days    Blood culture [[id number]] Collected:  11/23/19 1425    Order Status:  Completed Lab Status:  Preliminary result Updated:  11/25/19 1349    Specimen:  Blood from Catheter      Specimen Description Blood Red port DIALYSIS     Special Requests Aerobic and anaerobic bottles received     Culture Micro No growth after 2 days    Blood culture [[id number]] Collected:  11/23/19 1305    Order Status:  Completed Lab Status:  Preliminary result Updated:  11/25/19 1349    Specimen:  Blood from Hand, Left      Specimen Description Blood Left Hand     Special Requests Aerobic and anaerobic bottles received     Culture Micro No growth after 2 days    Blood culture     Order Status:  Canceled Lab Status:  No result     Specimen:  Blood from Catheter     Blood culture     Order Status:  Canceled Lab Status:  No result     Specimen:  Blood from Catheter     Blood Culture ONE site [[id number]] Collected:  11/23/19 1017    Order Status:  Completed Lab Status:  Preliminary result Updated:  11/25/19 1349    Specimen:  Blood from Arm, Left      Specimen Description Blood Unspecified Site     Special Requests Aerobic and anaerobic bottles received     Culture Micro No growth after 2 days            Recent Labs   Lab Test 09/11/17  0952 10/25/17  0640 05/16/18  1030 05/01/19  2258 11/23/19  1235   URINEPH 7.0 6.0 7.0 6.5 7.5*   NITRITE Negative Negative Negative Negative Negative   LEUKEST Negative Negative Negative Negative Negative   WBCU O - 2 3* 1 <1 2                          Imaging: Echocardiogram Complete    Result Date: 2019  [id number]^EMY    [facility] [address] [place] [id number]  Name: JONEL CHAVEZ MRN: [id number] : 1945 Study Date: 2019 11:45 AM Age: 74 yrs Gender: Female Patient Location: [facility] Reason For Study: Dyspnea, hypotension Ordering Physician: EMY ANTUNEZ Performed By: HEMAL Larson  BSA: 1.6 m2 Height: 65 in Weight: 130 lb BP: 75/45 mmHg _____________________________________________________________________________ __   Procedure Complete Portable Echo Adult. _____________________________________________________________________________ __   Interpretation Summary Global and regional left ventricular function is hyperkinetic with an EF of 65-70%. The pattern of wall thickening is consistent with hypertrophic cardiomyopathy. Basal septum measures 1.9 cm. LVOT gradient is 33 mmHg at rest. No RICHELLE or MR. Global right ventricular function is normal. IVC diameter <2.1 cm collapsing >50% with sniff suggests a normal RA pressure of 3 mmHg. No pericardial effusion is present. No change from prior study. _____________________________________________________________________________ __   Left Ventricle Global and regional left ventricular function is hyperkinetic with an EF of 65-70%. Left ventricular size is normal. The pattern of wall thickening is consistent with hypertrophic cardiomyopathy. Grade II or moderate diastolic dysfunction.  Right Ventricle The right ventricle is normal size. Global right ventricular function is normal. There is mild right ventricular hypertrophy.  Atria Both atria appear normal.   Mitral Valve Trace mitral insufficiency is present.  Aortic Valve The aortic valve is tricuspid. Mild aortic valve calcification is present.  Tricuspid Valve Trace tricuspid insufficiency is present. The peak velocity of the  tricuspid regurgitant jet is not obtainable.  Pulmonic Valve The pulmonic valve is normal.  Vessels The aorta root is normal. IVC diameter <2.1 cm collapsing >50% with sniff suggests a normal RA pressure of 3 mmHg.  Pericardium No pericardial effusion is present.  _____________________________________________________________________________ __ MMode/2D Measurements & Calculations IVSd: 1.5 cm LVIDd: 4.3 cm LVIDs: 2.8 cm LVPWd: 1.1 cm  FS: 34.9 % LV mass(C)d: 213.8 grams LV mass(C)dI: 129.8 grams/m2 Ao root diam: 3.2 cm LVOT diam: 2.2 cm LVOT area: 3.8 cm2 LA Volume (BP): 38.4 ml LA Volume Index (BP): 23.3 ml/m2 RWT: 0.53   Doppler Measurements & Calculations MV E max alberto: 69.4 cm/sec MV A max alberto: 75.2 cm/sec MV E/A: 0.92  MV dec slope: 210.0 cm/sec2 MV dec time: 0.33 sec PA V2 max: 111.0 cm/sec PA max P.9 mmHg PA acc time: 0.12 sec E/E' avg: 15.8 Lateral E/e': 16.0 Medial E/e': 15.6  _____________________________________________________________________________ __    Report approved by: Enrique Barnett 2019 12:30 PM      Us Ext Arterial Venous Dialys Acs Graft    Result Date: 2019  RIGHT ARM DIALYSIS FISTULA DUPLEX ULTRASOUND 2019 CLINICAL HISTORY: Right arm swelling. Pain. Right brachial-cephalic dialysis fistula created 2019. COMPARISONS: None available. REFERRING PROVIDER: ANDREW ALVAREZ GOODWIN TECHNIQUE: Grayscale, color Doppler, Doppler waveform ultrasound evaluation of the right arm brachial-cephalic dialysis circuit. FINDINGS: RIGHT: Internal jugular vein: 18 cm/s, phasic, fully compressible Brachial artery, upper arm: 211/69 cm/s, monophasic, brisk upstroke Brachial artery, mid arm: 289/107 cm/s, monophasic, brisk upstroke Brachial artery, antecubital fossa: 298/101 cm/s, monophasic, brisk upstroke Brachial artery, antecubital fossa: 4.2 mm diameter, 904 mL/min VolFlow Brachial artery, inferior to anastomosis: 130/0 cm/s, triphasic, antegrade Radial artery, origin: 53/0  cm/s, triphasic Ulnar artery: 73 cm/s, triphasic Arteriovenous anastomosis: 851/344 cm/s, 1.7 mm diameter Cephalic vein, central to anastomosis: 252/72 cm/s Cephalic vein, above antecubital fossa: 199/91 cm/s Cephalic vein, above antecubital fossa: 5.3 mm diameter, 982 mL/min VolFlow Cephalic vein, mid arm: 106/37 cm/s Cephalic vein, upper arm: 97/42 cm/s Cephalic vein, shoulder: 129/49 cm/s Cephalic arch: 413/224 cm/s Cephalic-subclavian confluence: 261/112 cm/s Axillary vein: 33 cm/s, phasic, retrograde Subclavian vein, mid: 97 cm/s Subclavian vein, central: 67 cm/s Innominate vein: 60 cm/s     IMPRESSION: 1. Arteriovenous anastomotic narrowing. 1.7 mm diameter. Almost tripling of peak systolic velocity (2.9 times) across the anastomosis with peak systolic velocity of 851 cm/s. 2. More than 600 mL/min flow volume through the cephalic vein. 3. Cephalic arch narrowing. More than tripling of peak systolic velocity form the cephalic vein in the shoulder to the arch. JHOAN BELL MD    Xr Chest Port 1 View    Result Date: 11/23/2019  EXAM: XR CHEST PORT 1 VW  11/23/2019 10:52 AM HISTORY:  cough hypotension   COMPARISON:  10/1/2019 FINDINGS: Single upright frontal radiograph of the chest. Right large bore venous catheter tip projects at the mid SVC. The trachea is midline. The cardiomediastinal silhouette is well-defined.. Acute airspace opacity. Left pleural effusion. No pneumothorax. Included osseous structures and soft tissues and upper abdomen are within normal limits.     IMPRESSION: No acute airspace opacity. Small left pleural effusion. I have personally reviewed the examination and initial interpretation and I agree with the findings. RAPHAEL BUTLER MD    Ct Chest Abdomen Pelvis W/o Contrast    Result Date: 11/23/2019  EXAMINATION: CT CHEST ABDOMEN PELVIS W/O CONTRAST  11/23/2019 2:53 PM CLINICAL HISTORY: Sepsis COMPARISON: CT chest 5/25/2019, CT pelvis 4/12/2018    PROCEDURE COMMENTS: CT of the chest,  abdomen, and pelvis was performed withou intravenous and oral contrast. Axial MIP  images of the chest, and coronal and sagittal reformatted images of the chest, abdomen, and pelvis obtained. FINDINGS:  Support devices: Tip of the right-sided double-lumen central venous catheter in the cavoatrial junction. Proximal left subclavian artery stent. Chest: Normal heart size. No significant pericardial effusion. Few prominent mediastinal lymph nodes without lymphadenopathy. Trachea midline. Scattered micronodules in tree-in-bud opacities in the right middle lobe and right greater than left upper lobes. Mild reticulation in the peripheral right lower and left lower lobes. Mild linear scarring in the lingula, medial right middle lobe, and posterolateral right upper lobe. Dependent atelectasis. No focal consolidation. Trace residual pleural effusions. Right lower lobe predominant bronchial wall thickening and mucous plugging. Abdomen/pelvis: Normal liver. Cholecystectomy. Atrophic pancreas. Lobulated fluid attenuation medial to the spleen near the pancreatic tail measuring up to 3.2 cm (series 5 image 298), similar to 5/25/2019. Surgical changes of left nephrectomy. Moderate perinephric fat stranding. No hydronephrosis, hydroureter, or urinary tract stone. Several vascular calcifications are noted about the distal right ureter. Urinary bladder is decompressed with diffuse bilateral thickening. Small hiatal hernia. Distended stomach. Small and large bowels are nonobstructed. Appendectomy, hysterectomy, and bilateral salpingooophorectomy. No pneumoperitoneum no abdominal ascites. Soft tissue/bones: Advanced degenerative changes in the L5-S1 with posterior disc bulge. Severe osteopenic bony appearance.     IMPRESSION: 1. Upper lung predominant micronodules and scattered tree-in-bud opacities, atypical infection versus respiratory bronchiolitis (smoking related injury) versus, less likely, hypersensitivity pneumonitis.  Associated right lower lobe predominant bronchial wall thickening and mucous plugging. Near resolution of the previously seen pleural effusions on 5/25/2019 CT. 2. Lobulated fluid attenuation medial to the spleen is grossly unchanged compared to 5/25/2019 and favored benign given stability. Limited evaluation secondary to lack of IV contrast. Differential includes a lymphangioma. This could be further evaluated with MRI on a nonemergent basis, if clinically indicated. 3. Moderate nonspecific right perinephric fat stranding. Correlate for urinary tract infection/pyelonephritis. No appreciable urinary tract stone or hydronephrosis. I have personally reviewed the examination and initial interpretation and I agree with the findings. RADHA LOYD, DO    Poc Us Echo Limited    Limited Bedside Cardiac Ultrasound, performed and interpreted by me. Indication: Hypotension/shock. Parasternal long axis, parasternal short axis, apical 4 chamber and subcostal views were acquired. Image quality was satisfactory. Findings:  Abnormal ?decreased LVEF on some view IMPRESSION: Abnormal limited cardiac ultrasound showing ?LVEF mildly low.  No pericardial effusion.    Poc Us Guidance Needle Placement    R Supraclavicular block

## 2019-11-25 NOTE — PLAN OF CARE
"Time 4219-9310     Reason for admission: unintentional overdose of beta blocker    Vitals: VSS on RA    /44 (BP Location: Left leg)   Pulse 60   Temp 98  F (36.7  C) (Oral)   Resp 16   Ht 1.651 m (5' 5\")   Wt 63.1 kg (139 lb 1.8 oz)   SpO2 92%   BMI 23.15 kg/m       Activity: SBA, Walker  Pain: c/o generalized joint pain and localized pain at R fistula surgical site  Neuro: A&Ox4; forgetful  Cardiac: bradycardic on tele  Respiratory: productive frequent cough  GI/: oliguric on HD. no BM on shift. Denies nausea.  Diet: Dialysis diet  Lines: fistula R arm. R subclavian HD cath. 2 R PIVs.   Skin: localized redness, edema at R fistula surgical site. Blanchable redness to coccyx  Labs: Cr 4.98  New this shift: oxy given for generalized joint pain. R elbox/fistula surgical site warm to touch, redness, edema, painful; cross cover notified and assessed bedside. Good radial pulse, thrill palpable, skin warm below site. Provider ordered vancomycin however vanco level is therapeutic and pharm does not recommend until after next dialysis run. Also, plan for ultrasound of fistula this am. Frequently monitoring site; symptoms continue.     Plan: ultrasound this am. discharge to prior living when stable HR and BP; PT assessment pending.      Continue to monitor and follow POC  "

## 2019-11-25 NOTE — PLAN OF CARE
"/42 (BP Location: Right leg)   Pulse 58   Temp 98.4  F (36.9  C) (Oral)   Resp 16   Ht 1.651 m (5' 5\")   Wt 63.8 kg (140 lb 11.2 oz)   SpO2 96%   BMI 23.41 kg/m      Time 1741-9910      Reason for admission: Hypotension  Vitals: VSS on RA  Activity: Up SBA w/walker  Pain: Denies pain  Neuro: A&Ox4, speech logical  Mood/Behavior: calm, cooperative  Cardiac: Tele~ bradycardic, no edema BLE  Respiratory: LS clear  GI/: No BM~ pt requested stool softener~PRN miralax and senokot given, voiding without difficulty  Diet: Dialysis diet  Lines: PIV   Skin: CDI      New changes this shift: US of CELSA. PT/OT worked with pt, recommending TCU. SW following.       Plan: Continue to monitor and follow POC.           "
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
## 2019-11-25 NOTE — PHARMACY-VANCOMYCIN DOSING SERVICE
Pharmacy Vancomycin Initial Note  Date of Service 2019  Patient's  1945  74 year old, female    Indication: Skin and Soft Tissue Infection    Current estimated CrCl = Estimated Creatinine Clearance: 9.9 mL/min (A) (based on SCr of 4.98 mg/dL (H)).    Creatinine for last 3 days  2019:  8:32 AM Creatinine 5.19 mg/dL  2019: 10:03 AM Creatinine 3.73 mg/dL  2019:  3:35 AM Creatinine 4.98 mg/dL    Recent Vancomycin Level(s) for last 3 days  2019: 10:03 AM Vancomycin Level 11.9 mg/L  2019:  2:38 AM Vancomycin Level 19.5 mg/L      Vancomycin IV Administrations (past 72 hours)                   vancomycin (VANCOCIN) 750 mg in sodium chloride 0.9 % 250 mL intermittent infusion (mg) 750 mg New Bag 19 1544                Nephrotoxins and other renal medications (From now, onward)    Start     Dose/Rate Route Frequency Ordered Stop    19 0150  vancomycin place wolfe - receiving intermittent dosing      1 each Intravenous SEE ADMIN INSTRUCTIONS 19 0151            Contrast Orders - past 72 hours (72h ago, onward)    None           Plan:  1.  Start intermittent Vancomycin following Dialysis    2.  Goal Trough Level: 10-15 mg/L   3.  Pharmacy will check trough levels as appropriate, before next dialysis session   4. Serum creatinine levels will be ordered daily for the first week of therapy and at least twice weekly for subsequent weeks.    5. [place] method utilized to dose vancomycin therapy: Method 2    Maggie Jung, PharmD, BCPS

## 2019-11-25 NOTE — PLAN OF CARE
Discharge Planner OT   Patient plan for discharge: unclear; pt not fully oriented to situation   Current status: Pt evaluated and treatment initiated. Pt CGA with FWW for functional transfers and mobility. Noted mild LOB with ambulating ~15 ft in room. Pt fatiguing quickly, reporting LE pain and weakness. Pt completing ~1 minute of standing ADLs at the sink before needed to sit down 2/2 LE pain and weakness. Pt requiring reminders to complete instructions, unable to follow more than 1 step commands, poor historian. Pt scoring 8/30 on the MoCA, indicating significant cognitive deficits. HR up to 120s after activity, all other VSS. Pt did not verbalize any significant supports at home.   Barriers to return to prior living situation: deconditioning, cognitive status, medical status  Recommendations for discharge: TCU  Rationale for recommendations: Pt is below baseline and would benefit from continued skilled therapy to increase activity tolerance and independence with ADLs.         Entered by: Pratibha Mercer 11/25/2019 9:45 AM

## 2019-11-25 NOTE — PROGRESS NOTES
[facility] Care Coordination Contact    TRANSITIONS OF CARE (KERRIE) LOG   KERRIE tasks should be completed by the CC within one (1) business day of notification of each transition. Follow up contact with member is required after return to their usual care setting.  Note:  If CC finds out about the transitions fifteen (15) days or more after the member has returned to their usual care setting, no KERRIE log is needed. However, the CC should check in with the member to discuss the transition process, any changes needed to the care plan and document it in a case note.    Member Name:  Kim Anne [facility] Name:  [facility]/Health Plan Member ID#: [id number]   Product: [facility] Care Coordinator Contact:  Batool Mai RN, PHN/Sakina Carrion covering  Agency/County/Care System: [facility]   Transition Communication Actions from Care Management Contact   Transition #1   Notification Date: 11/25/19 Transition Date:   11/23/19 Transition From: Home  (HD unit)      Is this the member s usual care setting?               yes Transition To: Hospital, [facility]    Transition Type:  Unplanned  Reason for Admission/Comments:  Hypotension while at HD     [facility] Care Coordination Contact  CC contacted Hospital /discharge planner Shannon VELASQUEZ [phone] and reviewed community POC. CC requested to be notified of concerns, care conference dates and discharge planning.  Explained to Shannon that member's CC has not been able to reach member past couple of years to complete assess. Reviewed Epic notes, OT recommending TCU.  Shannon states that she is unsure if member is agreeable to TCU plan. Discussed referral to HC if member discharge to home due to taking additional doses of Coreg. Shannon will f/u with CC.   Transition log initiated.   PCP notified of hospitalization via EMR.  Sakina Carrion RN, BC  Manager [facility] Care Coordinator   [phone]  (Fax)         Shared CC contact  info, care plan/services with receiving setting--Date completed: see above 11/25/19   Notified PCP of transition--Date completed:  11/23/19     via  EMR   Transition #2   Transition #3  (if applicable)   Notification Date: 11/27/19  Transition To:  Home  Transition Date: 11/27/19     Transition Type:    Planned  Notified PCP -- Date completed: 11/27/19              Shared CC contact info, care plan/services with receiving setting or, if applicable, home care agency--Date completed:  11/27/19  *Complete additional tasks below, if this transition is a return to usual care setting.      Comments:      11/27/2019CC contacted member, person answering the phone said she was sleeping. Provided CC and contact information, requesting a return call   Member has a follow-up appointment with PCP in 7 days: No, member has dialysis 3x/wk.  CC to f/u   E-mail sent to Connie RIVERA [facility] to introduce myself, shared info that member has been unable to contact or refusal of visit in the past. Explained that CC will enc an assessment to assist with home services.    Member has had a change in condition: will cont to make attempts to reach member.   Home visit needed:member will benefit from a visit/assessment.   New referrals placed: Yes: RN  [facility] referral placed to hospital staff.   Transition log completed.   PCP notified of transition back to home via EMR.  Sakina Carrion RN, BC  Manager [facility] Care Coordinator   [phone]  (Fax)         Notification Date:          Transition To:    Transition Date:              Transition Type:      Notified PCP--Date completed:          Shared CC contact info, care plan/services with receiving setting or, if applicable, home care agency--Date completed:       *Complete additional tasks below, if this transition is a return to usual care setting.      Comments:       *Complete tasks below when the member is discharging TO their usual care setting within one (1) business day of  notification.  For situations where the Care Coordinator is notified of the discharge prior to the date of discharge, the Care Coordinator must follow up with the member or designated representative to confirm that discharge actually occurred and discuss required KERRIE tasks as outlined in the KERRIE Instructions.  (This includes situations where it may be a  new  usual care setting for the member. (i.e., a community member who decides upon permanent nursing home placement following hospitalization and rehab).    Date completed:   Communicated with member or their designated representative about the following:  care transition process; about changes to the member s health status; plan of care updates; education about transitions and how to prevent unplanned transitions/readmissions  Four Pillars for Optimal Transition:    Check  Yes  - if the member, family member and/or SNF/facility staff manages the following:    If  No  provide explanation in the comments section.          [x]  Yes     []  No     Does the member have a follow-up appointment scheduled with primary care or specialist? (Mental health hospitalizations--the appt. should be w/in 7 days)   []  Yes     [x]  No     Can the member manage their medications or is there a system in place to manage medications (e.g. home care set-up)?         [x]  Yes     []  No     Can the member verbalize warning signs and symptoms to watch for and how to respond?         [x]  Yes     []  No     Does the member use a Personal Health Care Record?  Check  Yes  if visit summary, discharge summary, and/or healthcare summary are being used as a PHR.                                                                                                                                                                                    [] Yes      [x] No      Have you updated the member s care plan?  If  No  provide explanation in comments.   Comments:  12/2/2019 Rec'd e-mail from Remberto RIVERA [facility]  stating that she is familiar with this member and that it can be difficult to reach member.    12/2/2019 Call placed to member (telephone busy for most of the morning), introduced myself and inquired on how she is doing. Member states that she is doing better, states that she finished her last dose of antibiotic.  Member states that she has dialysis Tu/Th and Friday. Reviewed discharge summary, member states that she does not have any medications, she does not have a nebulizer machine. Explained that she will be getting a phone call from Remberto RIVERA [facility] to set up a visit. Conference call with member placed to PCC to schedule f/u visit, earliest appt with Dr. Nieves is 12/6/19 @ 8:30, member states that this will work for her.   Offered to schedule a home visit, member states that she does not believe she needs assistance in the home. Member states that she drives herself to dialysis most days, otherwise a friend will use her van to assist her with transportation. PLAN: Explained that CC will update Remberto on above information, This CC will f/u with member early next week. Member has this CC contact information.  Sakina Carrion RN, BC  Manager [facility] Care Coordinator   [phone]  (Fax)    12/9/19 refer to EMR, member declines services, no care plan to update.   Sakina Carrion RN, BC  Manager [facility] Care Coordinator   [phone]  (Fax)

## 2019-11-25 NOTE — PROGRESS NOTES
"   11/25/19 8382   Quick Adds   Type of Visit Initial Occupational Therapy Evaluation   Living Environment   Lives With child(dari), adult;grandchild(dari)  (pt requiring a lot of time to answer, unclear if accurate)   Living Arrangements house   Home Accessibility stairs to enter home;stairs within home   Number of Stairs, Main Entrance 4   Number of Stairs, Within Home, Primary other (see comments)  (17)   Transportation Anticipated family or friend will provide   Living Environment Comment Pt poor historian, requiring a lot of time to answer PLOF questions; has tub shower, says she does not drive, however she does not remember how she gets around. Pt's son is also \"sick\" and unable to assist.    Self-Care   Usual Activity Tolerance fair   Current Activity Tolerance poor   Regular Exercise No   Equipment Currently Used at Home cane, straight;walker, rolling   Activity/Exercise/Self-Care Comment Pt reports being sedantary and only gets up to walk to the bathroom and back.    Functional Level   Ambulation 1-->assistive equipment   Transferring 1-->assistive equipment   Toileting 0-->independent   Bathing 0-->independent   Dressing 0-->independent   Eating 0-->independent   Communication 0-->understands/communicates without difficulty   Swallowing 0-->swallows foods/liquids without difficulty   Cognition 0 - no cognition issues reported   Fall history within last six months no   Which of the above functional risks had a recent onset or change? ambulation;transferring;toileting;bathing;dressing;cognition   General Information   Onset of Illness/Injury or Date of Surgery - Date 11/23/19   Referring Physician Leah Mckeon MD   Patient/Family Goals Statement To get better and go home   Additional Occupational Profile Info/Pertinent History of Current Problem Kim Anne is a 74 year old female with ESRD secondary to previous kidney donation and DM who presented with hypotension and bradycardia due to " accidental beta blocker overdose. She is on HD [facility] TTS schedule. BP has recovered and currently she is stable.   Precautions/Limitations fall precautions   Weight-Bearing Status - LUE full weight-bearing   Weight-Bearing Status - RUE full weight-bearing   Weight-Bearing Status - LLE full weight-bearing   Weight-Bearing Status - RLE full weight-bearing   Heart Disease Risk Factors Age;Lack of physical activity   General Observations Pt pleasant, confused and forgetful; agreeable to therapy   General Info Comments Activity: up ad yohana   Cognitive Status Examination   Orientation person;place   Level of Consciousness confused   Follows Commands (Cognition) follows one step commands;increased processing time needed;repetition of directions required   Memory impaired   Attention Sustained attention impaired   Executive Function Self awareness/monitoring impaired   Cognitive Comment Pt oriented x2, requiring repetition of directions. Pt uncertain of her PLOF and home situation.   Visual Perception   Visual Perception Wears glasses   Sensory Examination   Sensory Quick Adds No deficits were identified   Pain Assessment   Patient Currently in Pain Yes, see Vital Sign flowsheet   Integumentary/Edema   Integumentary/Edema no deficits were identifed   Range of Motion (ROM)   ROM Quick Adds No deficits were identified   Strength   Strength Comments general weakness, B UEs generally 4/5   Hand Strength   Hand Strength Comments B UEs WFL    Activities of Daily Living Analysis   Impairments Contributing to Impaired Activities of Daily Living balance impaired;fear and anxiety;strength decreased;pain   General Therapy Interventions   Planned Therapy Interventions ADL retraining;IADL retraining;strengthening;home program guidelines;progressive activity/exercise;risk factor education;cognition   Clinical Impression   Criteria for Skilled Therapeutic Interventions Met yes, treatment indicated   OT Diagnosis Decreased safety  "and independence in    Influenced by the following impairments deconditioning, weakness, cognitive deficits   Assessment of Occupational Performance 5 or more Performance Deficits   Identified Performance Deficits bathing, cooking, medication management, leisure, dressing   Clinical Decision Making (Complexity) Low complexity   Therapy Frequency 5x/week   Predicted Duration of Therapy Intervention (days/wks) 1 week   Anticipated Equipment Needs at Discharge   (TBD)   Anticipated Discharge Disposition Transitional Care Facility   Risks and Benefits of Treatment have been explained. Yes   Patient, Family & other staff in agreement with plan of care Yes   [facility] TM \"6 Clicks\"   2016, Trustees of [facility], under license to [employer].  All rights reserved.   6 Clicks Short Forms Daily Activity Inpatient Short Form   [facility]  \"6 Clicks\" Daily Activity Inpatient Short Form   1. Putting on and taking off regular lower body clothing? 2 - A Lot   2. Bathing (including washing, rinsing, drying)? 2 - A Lot   3. Toileting, which includes using toilet, bedpan or urinal? 3 - A Little   4. Putting on and taking off regular upper body clothing? 3 - A Little   5. Taking care of personal grooming such as brushing teeth? 3 - A Little   6. Eating meals? 4 - None   Daily Activity Raw Score (Score out of 24.Lower scores equate to lower levels of function) 17   Total Evaluation Time   Total Evaluation Time (Minutes) 5     "

## 2019-11-25 NOTE — PLAN OF CARE
Pt admitted for hypotension and bradycardia. Pt A&Ox4, but forgetful. Pt up with stand by assist and her walker. Pt denied any pain. IV antibiotics given for PNA. Pt had a fistula in the right arm. Pt also had a catheter in her right chest. BP only on the lower extremities due to left subclavian stenosis. Tele was sinus matthew. Pt able to make her needs known. Possible discharge home tomorrow pending OT/ PT consult. Continue with plan of care.

## 2019-11-26 ENCOUNTER — APPOINTMENT (OUTPATIENT)
Dept: OCCUPATIONAL THERAPY | Facility: CLINIC | Age: 74
DRG: 193 | End: 2019-11-26
Payer: COMMERCIAL

## 2019-11-26 LAB
BACTERIA SPEC CULT: ABNORMAL
BACTERIA SPEC CULT: ABNORMAL
GLUCOSE BLDC GLUCOMTR-MCNC: 145 MG/DL (ref 70–99)
GLUCOSE BLDC GLUCOMTR-MCNC: 166 MG/DL (ref 70–99)
GLUCOSE BLDC GLUCOMTR-MCNC: 207 MG/DL (ref 70–99)
GLUCOSE BLDC GLUCOMTR-MCNC: 99 MG/DL (ref 70–99)
HBV SURFACE AB SERPL IA-ACNC: 101.4 M[IU]/ML
HBV SURFACE AG SERPL QL IA: NONREACTIVE
SPECIMEN SOURCE: ABNORMAL
VANCOMYCIN SERPL-MCNC: 18.4 MG/L

## 2019-11-26 PROCEDURE — 25800030 ZZH RX IP 258 OP 636: Performed by: INTERNAL MEDICINE

## 2019-11-26 PROCEDURE — 25000132 ZZH RX MED GY IP 250 OP 250 PS 637: Performed by: STUDENT IN AN ORGANIZED HEALTH CARE EDUCATION/TRAINING PROGRAM

## 2019-11-26 PROCEDURE — 12000001 ZZH R&B MED SURG/OB UMMC

## 2019-11-26 PROCEDURE — 40000141 ZZH STATISTIC PERIPHERAL IV START W/O US GUIDANCE

## 2019-11-26 PROCEDURE — 97535 SELF CARE MNGMENT TRAINING: CPT | Mod: GO

## 2019-11-26 PROCEDURE — 86706 HEP B SURFACE ANTIBODY: CPT | Performed by: INTERNAL MEDICINE

## 2019-11-26 PROCEDURE — 80202 ASSAY OF VANCOMYCIN: CPT | Performed by: STUDENT IN AN ORGANIZED HEALTH CARE EDUCATION/TRAINING PROGRAM

## 2019-11-26 PROCEDURE — 25000132 ZZH RX MED GY IP 250 OP 250 PS 637: Performed by: PHYSICIAN ASSISTANT

## 2019-11-26 PROCEDURE — 36415 COLL VENOUS BLD VENIPUNCTURE: CPT | Performed by: STUDENT IN AN ORGANIZED HEALTH CARE EDUCATION/TRAINING PROGRAM

## 2019-11-26 PROCEDURE — 99233 SBSQ HOSP IP/OBS HIGH 50: CPT | Performed by: INTERNAL MEDICINE

## 2019-11-26 PROCEDURE — 90937 HEMODIALYSIS REPEATED EVAL: CPT

## 2019-11-26 PROCEDURE — 00000146 ZZHCL STATISTIC GLUCOSE BY METER IP

## 2019-11-26 PROCEDURE — 25000128 H RX IP 250 OP 636: Performed by: INTERNAL MEDICINE

## 2019-11-26 PROCEDURE — 87340 HEPATITIS B SURFACE AG IA: CPT | Performed by: INTERNAL MEDICINE

## 2019-11-26 RX ORDER — HEPARIN SODIUM 1000 [USP'U]/ML
500 INJECTION, SOLUTION INTRAVENOUS; SUBCUTANEOUS
Status: COMPLETED | OUTPATIENT
Start: 2019-11-26 | End: 2019-11-26

## 2019-11-26 RX ORDER — HEPARIN SODIUM 1000 [USP'U]/ML
500 INJECTION, SOLUTION INTRAVENOUS; SUBCUTANEOUS CONTINUOUS
Status: DISCONTINUED | OUTPATIENT
Start: 2019-11-26 | End: 2019-11-26

## 2019-11-26 RX ADMIN — SODIUM CHLORIDE 250 ML: 9 INJECTION, SOLUTION INTRAVENOUS at 08:15

## 2019-11-26 RX ADMIN — AZITHROMYCIN MONOHYDRATE 250 MG: 250 TABLET ORAL at 12:40

## 2019-11-26 RX ADMIN — Medication 1 MG: at 22:04

## 2019-11-26 RX ADMIN — LEVOTHYROXINE SODIUM 200 MCG: 0.03 TABLET ORAL at 12:40

## 2019-11-26 RX ADMIN — ATORVASTATIN CALCIUM 40 MG: 40 TABLET, FILM COATED ORAL at 21:24

## 2019-11-26 RX ADMIN — FLUTICASONE FUROATE AND VILANTEROL TRIFENATATE 1 PUFF: 200; 25 POWDER RESPIRATORY (INHALATION) at 12:42

## 2019-11-26 RX ADMIN — AMOXICILLIN AND CLAVULANATE POTASSIUM 1 TABLET: 500; 125 TABLET, FILM COATED ORAL at 12:40

## 2019-11-26 RX ADMIN — SODIUM CHLORIDE 300 ML: 9 INJECTION, SOLUTION INTRAVENOUS at 08:15

## 2019-11-26 RX ADMIN — HEPARIN SODIUM 500 UNITS: 1000 INJECTION, SOLUTION INTRAVENOUS; SUBCUTANEOUS at 08:16

## 2019-11-26 RX ADMIN — HEPARIN SODIUM 500 UNITS/HR: 1000 INJECTION, SOLUTION INTRAVENOUS; SUBCUTANEOUS at 08:16

## 2019-11-26 RX ADMIN — Medication 5 MG: at 17:51

## 2019-11-26 RX ADMIN — UMECLIDINIUM 1 PUFF: 62.5 AEROSOL, POWDER ORAL at 12:42

## 2019-11-26 ASSESSMENT — MIFFLIN-ST. JEOR: SCORE: 1142.27

## 2019-11-26 ASSESSMENT — ACTIVITIES OF DAILY LIVING (ADL)
ADLS_ACUITY_SCORE: 18
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
## 2019-11-26 NOTE — PROGRESS NOTES
HEMODIALYSIS TREATMENT NOTE    Date: 11/26/2019  Time: 1146    Data:  Pre Wt: 64.1 kg   Desired Wt: 63 kg   Post Wt:    Weight change:     Ultrafiltration - Post Run Net Total Removed (mL): 1100 mL  Vascular Access Status: patent  Dialyzer Rinse: Light  Total Blood Volume Processed: 57.44 L   Total Dialysis (Treatment) Time: 3.5 hours    Lab: No     Assessment/Interventions:  3.5 hour HD run via right internal jugular dialysis catheter.  500 Unit Heparin bolus given at initiation of run followed by 500 units/hr for a total of 2200 units.  1 kg removed.  Tolerated well.  Drsg to dialysis catheter changed.  Report called.     Plan:  Continue with plan of care.  Next run per Renal Team.

## 2019-11-26 NOTE — PROGRESS NOTES
"Social Work Services Progress Note    Hospital Day: 4  Date of Initial Social Work Evaluation:  11/25/19  Collaborated with:  Primary team Gold 9, pt's son Ki (ph [phone]), patient    Data:  Pt is a 74-year-old female admitted to [facility] 11/23/19 with concerns of hypotension. TCU is recommended at discharge. Pt is medically stable for discharge.    Intervention:  Pt accepted to [facility] for TCU placement. Pt reported she wanted to go to TCU yesterday but later in the day told PT she wanted to go home. Called pt's son Ki who is listed as pt's primary healthcare agent. A woman named Angelina answered and stated that she is pt's niece and Ki unavailable to come to the phone d/t \"respiratory issues.\" Angelina stated that Ki wants pt to return home. Asked to speak with Ki directly. Explained discharge recommendations to Ki. Ki states that if the pt wants to come home she can come home. Asked Ki if the pt chooses [facility] if he is agreeable with that plan and he stated yes. Ki states he provides transportation to/from dialysis. Asked Ki who pt lives with and he stated \"me and some other people.\" He states someone is with pt 24/7 at home.     Met with pt. Pt's granddaughter Katlyn at beside but did not participate in conversation; was on phone. Reminded pt of discussion yesterday and that [facility] can accept her for TCU. Pt states she plans to \"go straight home.\" Asked pt if she feels strong enough to go home right now and she states \"no, I am dizzy when I get up to go to the bathroom.\" Discussed that this is why TCU is recommended. Pt repeated she wants to go \"straight home.\" Asked pt if she has help at home and she pointed to her granddaughter. Pt states she would like to go home tomorrow. Pt states she would be agreeable with home PT.     Called pt's son/[facility] Ki to update him on d/c plan- no answer and unable to leave message.    Assessment:  Pt declining TCU and requesting " "to d/c home    Plan:    Anticipated Disposition:  Home with services    Barriers to d/c plan:  Medical stability    Follow Up:  SW to follow and assist as needed    ANA Valdez, [facility]    [facility]  Pager [phone]  Phone [phone]     Addendum 3:07pm: Called pt's son Ki again. Ki agreeable with pt's wish to d/c home. Ki agreeable with home care services and states \"send them all.\" Ki has no preference for agency and states he is ok with any agency pt chooses.         "

## 2019-11-26 NOTE — PLAN OF CARE
PT Cancel: Pt declining therapy at both attempts to be seen. First pt was finishing up lunch, second time she was trying to make a phone call. Will reschedule to 11/27.

## 2019-11-26 NOTE — PLAN OF CARE
Discharge Planner OT   Patient plan for discharge: did not discussed   Current status: Pt CGA for functional transfers and CGA with 4WW for mobility in room. Pt completed full shower seated with CGA. Pt needing cues for sequencing/problem-solving tasks, pt with inconsistent reporting.   Barriers to return to prior living situation: medical status, deconditioning, cognitive dysfunction  Recommendations for discharge: TCU  Rationale for recommendations: Pt is below baseline and would benefit from continued skilled therapy to increase activity tolerance and independence with ADLs.         Entered by: Patsy Schmid 11/26/2019 9:33 AM

## 2019-11-26 NOTE — PROGRESS NOTES
"  Care Coordinator Progress Note    Admission Date/Time:  11/23/2019  Attending MD:  Rui Guerrero MD    Data  Chart reviewed, discussed with interdisciplinary team.   Patient was admitted for:    Hypotension, unspecified hypotension type  ESRD (end stage renal disease) on dialysis (H)  Acute on chronic respiratory failure with hypoxia (H)  Cigarette smoker  ESRD on dialysis (H).    Concerns with insurance coverage for discharge needs: None.  Current Living Situation: Patient lives with family.  Support System: Involved  Services Involved: Dialysis Services and Home Care  Transportation at Discharge: Family or friend will provide and patient stated \"she needed to borrow money for gas\"  Transportation to Medical Appointments:   - Not applicable  Barriers to Discharge: medically stable    Coordination of Care and Referrals: Provided patient/family with options for Home Care.        Assessment  Met with patient in room. RNCC role was introduced and discharge plans were discussed.     Therapies are recommending TCU, however, patient is refusing. SW contacted patient's son, July, patient's health care agent, and stated that it was the patient's decision and supportive if patient wants to return home.     Patient is requesting home therapies PT, OT, RN. Patient was offered choice of agency and medicare.gov with star ratings were provided. Patient would like referral made to [facility] as she has used in the past. Referral was made and orders placed.    Patient stated she \"thinks she has a ride\" if discharge tomorrow and that \"she will need to borrow money for gas.\" Patient stated she will need to work on that for tomorrow and will get back to RNCC tomorrow.     RNCC called outpatient dialysis center and updated on discharge plan and to anticipate patient back on Thursday for dialysis.      Plan  Anticipated Discharge Date:  11/27/19  Anticipated Discharge Plan:  Home with home care.     Shannon Albert, NICOLE, MHA  Internal " Medicine Care Coordinator  Phone: [phone] Pager: [phone]  [facility] [place]     To contact Weekend RNCC, dial [phone] and enter job code [phone] at prompt.   This pager can not be contacted by text page or outside line.

## 2019-11-26 NOTE — PHARMACY-VANCOMYCIN DOSING SERVICE
Pharmacy Vancomycin Note  Date of Service 2019  Patient's  1945   74 year old, female    Indication: Skin and Soft Tissue Infection  Goal Trough Level: 10-15 mg/L  Day of Therapy: 3  Current Vancomycin regimen:  Intermittent dosing based on levels     Current estimated CrCl = Estimated Creatinine Clearance: 7.6 mL/min (A) (based on SCr of 6.54 mg/dL (H)).    NASAL SWAB: MRSA negative    Creatinine for last 3 days  2019:  3:35 AM Creatinine 4.98 mg/dL  2019:  8:13 AM Creatinine 6.54 mg/dL    Recent Vancomycin Levels (past 3 days)  2019:  2:38 AM Vancomycin Level 19.5 mg/L  2019:  6:09 AM Vancomycin Level 18.4 mg/L. Represents a pre-HD level    Vancomycin IV Administrations (past 72 hours)      No vancomycin orders with administrations in past 72 hours.                Nephrotoxins and other renal medications (From now, onward)    Start     Dose/Rate Route Frequency Ordered Stop    19 0900  amoxicillin-clavulanate (AUGMENTIN) 500-125 MG per tablet 1 tablet      1 tablet Oral EVERY 24 HOURS SCHEDULED 19 1423 19 0859             Contrast Orders - past 72 hours (72h ago, onward)    None          Interpretation of levels and current regimen:  Trough level is  Therapeutic    Has serum creatinine changed > 50% in last 72 hours: No    Urine output:  anuric    Renal Function: ESRD on Dialysis    Plan: DC Vancomycin! Transitioned to alternative antibiotics    Thanks for the consult  Donal Bragg RP        .

## 2019-11-26 NOTE — PLAN OF CARE
0429-3833 A&Ox4, forgetful but able to make needs known.  VSS on RA, tele sinus matthew. C/o leg pain, tolerable with heating pad.  Up w/ SBA +W to bathroom.  Stool softeners given on day shift, pt had BM.  Oliguric, on HD.  Plan for HD this AM @ 0745.  Pt Denies pain at fistula site. CVC line intact.  L PIV x2 SL.  before bed & 99 overnight.  MRSA negative. Start PO abx today. SW working on TCU placement. Continue to monitor and follow POC.

## 2019-11-27 ENCOUNTER — APPOINTMENT (OUTPATIENT)
Dept: PHYSICAL THERAPY | Facility: CLINIC | Age: 74
DRG: 193 | End: 2019-11-27
Payer: COMMERCIAL

## 2019-11-27 VITALS
OXYGEN SATURATION: 97 % | HEART RATE: 71 BPM | RESPIRATION RATE: 16 BRPM | SYSTOLIC BLOOD PRESSURE: 119 MMHG | DIASTOLIC BLOOD PRESSURE: 44 MMHG | WEIGHT: 141.4 LBS | HEIGHT: 65 IN | TEMPERATURE: 97.9 F | BODY MASS INDEX: 23.56 KG/M2

## 2019-11-27 LAB
GLUCOSE BLDC GLUCOMTR-MCNC: 115 MG/DL (ref 70–99)
PHOSPHATE SERPL-MCNC: 4.2 MG/DL (ref 2.5–4.5)

## 2019-11-27 PROCEDURE — 97116 GAIT TRAINING THERAPY: CPT | Mod: GP

## 2019-11-27 PROCEDURE — 25000132 ZZH RX MED GY IP 250 OP 250 PS 637: Performed by: STUDENT IN AN ORGANIZED HEALTH CARE EDUCATION/TRAINING PROGRAM

## 2019-11-27 PROCEDURE — 99239 HOSP IP/OBS DSCHRG MGMT >30: CPT | Performed by: INTERNAL MEDICINE

## 2019-11-27 PROCEDURE — 97530 THERAPEUTIC ACTIVITIES: CPT | Mod: GP

## 2019-11-27 PROCEDURE — 00000146 ZZHCL STATISTIC GLUCOSE BY METER IP

## 2019-11-27 PROCEDURE — 36415 COLL VENOUS BLD VENIPUNCTURE: CPT | Performed by: INTERNAL MEDICINE

## 2019-11-27 PROCEDURE — 84100 ASSAY OF PHOSPHORUS: CPT | Performed by: INTERNAL MEDICINE

## 2019-11-27 PROCEDURE — 97535 SELF CARE MNGMENT TRAINING: CPT | Mod: GO

## 2019-11-27 RX ORDER — AMOXICILLIN AND CLAVULANATE POTASSIUM 500; 125 MG/1; MG/1
1 TABLET, FILM COATED ORAL EVERY 24 HOURS
Qty: 5 TABLET | Refills: 0 | Status: SHIPPED | OUTPATIENT
Start: 2019-11-27 | End: 2019-12-06

## 2019-11-27 RX ADMIN — AZITHROMYCIN MONOHYDRATE 250 MG: 250 TABLET ORAL at 09:17

## 2019-11-27 RX ADMIN — UMECLIDINIUM 1 PUFF: 62.5 AEROSOL, POWDER ORAL at 09:17

## 2019-11-27 RX ADMIN — LEVOTHYROXINE SODIUM 200 MCG: 0.03 TABLET ORAL at 09:17

## 2019-11-27 RX ADMIN — FLUTICASONE FUROATE AND VILANTEROL TRIFENATATE 1 PUFF: 200; 25 POWDER RESPIRATORY (INHALATION) at 09:17

## 2019-11-27 RX ADMIN — AMOXICILLIN AND CLAVULANATE POTASSIUM 1 TABLET: 500; 125 TABLET, FILM COATED ORAL at 09:17

## 2019-11-27 ASSESSMENT — ACTIVITIES OF DAILY LIVING (ADL)
ADLS_ACUITY_SCORE: 18

## 2019-11-27 NOTE — PROGRESS NOTES
[facility], [facility] Progress Note - Hospitalist Service, Gold 09       Date of Admission:  11/23/2019  Assessment & Plan   # Hypotension 2/2 beta blocker toxicity vs severe sepsis   #CAP   #Leukocytosis   Patient with persistent bradycardia and hypotension despite fluid resuscitation in the ED. EKG on admission showing A.Tach with bradycardia. She noted taking too much coreg and has approximately 4 pills unaccounted for. She was challenged with Glucagon with a slight increase in HR. Empirically started on Vancomycin + Zosyn for unclear source of infection. ;ater changed to Augmentin and Zithromax      Infectious work up including CT chest/abd pelvis pertinent for tree in bud opacities c/w atypical infection.      - Hold home coreg  - s/p 5mg glucagon for beta blocker toxicity with improvement   - Ceftriaxone + Azithromycin ---> Augmentin + Azithromycin to finish 5 days (end date 11/27)  - Negative flu swab   - Blood cultures with NGTD       #Right arm swelling   Noted on 11/24 overnight. No evidence of infection. No history of trauma.   US fistula obtained and showing tripling of velocities across cephalic system. Will discuss with Nephrology.      # History of COPD   - Continue home albuterol, Advair and Tiotropium      #History of PSVT/A.Tach   Seen by Dr. Cool on 9/2019 where she was transitioned from diltiazem to Carvedilol. If BP continues to be an issue we can transition back to CCB or Metoprolol.   - Hold Coreg for now     #Non obstructive CAD   #History of A.Tach   - Ap: Aspirin 81 mg   - Statin: atorvastatin 40 mg   - BB: holding coreg for now   - EF>35%, no need for ACE/ARB, Ald ant, SCD ppx       # S/P LDKT 1988  # ESRD 2/2 Diabetic Nephropathy on HD TTh  - Nephrology consult placed for management of HD  - Access: Tunneled subclavian       #Hypothyroidism   Patient with a h/o hypothyroidism, TSH checked on admission was 83 with T4 0.54. Was noted in her med rec  that she has not been taking her Levothyroxine. No clinical signs of myxedema.   - Restart home dose Levothyroxine  - Should follow up with Endocrinology at discharge      #DMII  H/o DMII c/b nephropathy. Most recent A1C 5.2 on 5/1/19. No current medications for diabetes.   - Continue to monitor BG      Consulting Services:   Nephrology     Diet: Dialysis Diet  Room Service  Snacks/Supplements Adult: Boost Plus; Between Meals    DVT Prophylaxis: Heparin SQ  Chaney Catheter: not present  Code Status: Full Code      Disposition Plan   Expected discharge: Tomorrow, recommended to prior living arrangement once antibiotic plan established.  Entered: Rui Guerrero MD 11/26/2019, 6:11 PM       The patient's care was discussed with the Bedside Nurse and Patient.    Rui Guerrero MD  Hospitalist Service, [address], [place]  Pager: [phone]  Please see sticky note for cross cover information  ______________________________________________________________________    Interval History   Fever free, BP improved   More alert but feels tired     Data reviewed today: I reviewed all medications, new labs and imaging results over the last 24 hours.     ROS   NO fever  Generalized fatigue   No cough or SOB   NO diarrhea       Physical Exam   Vital Signs: Temp: 99  F (37.2  C) Temp src: Oral BP: 98/50 Pulse: 71 Heart Rate: 62 Resp: 16 SpO2: 96 % O2 Device: None (Room air)    Weight: 141 lbs 6.4 oz  Hematologic / Lymphatic: no cervical lymphadenopathy  GI: No scars, normal bowel sounds, soft, non-distended, non-tender, no masses palpated, no hepatosplenomegally  Neurologic: Awake, alert, oriented to name, place and time.  Cranial nerves II-XII are grossly intact.  Motor is 5 out of 5 bilaterally.  Cerebellar finger to nose, heel to shin intact.  Sensory is intact.  Babinski down going, Romberg negative, and gait is normal.    Data   Recent Labs   Lab 11/25/19  0813 11/24/19  0335 11/23/19  1003  11/22/19  0832   WBC 8.4 7.7 12.1* 10.2   HGB 10.3* 10.0* 12.9 12.7   MCV 98 99 97 95    182 235 242   INR  --   --  1.02 0.97    139 135  --    POTASSIUM 4.7 4.6 3.9 4.4   CHLORIDE 107 106 101  --    CO2 19* 24 26  --    BUN 44* 36* 21  --    CR 6.54* 4.98* 3.73* 5.19*   ANIONGAP 11 8 7  --    FRANCES 7.7* 7.8* 8.7  --    * 133* 129* 119*   ALBUMIN  --   --  3.2*  --    PROTTOTAL  --   --  7.4  --    BILITOTAL  --   --  0.4  --    ALKPHOS  --   --  139  --    ALT  --   --  25  --    AST  --   --  25  --    TROPI  --   --  0.040  --      No results found for this or any previous visit (from the past 24 hour(s)).  Medications       amoxicillin-clavulanate  1 tablet Oral Q24H LUIS     atorvastatin  40 mg Oral At Bedtime     azithromycin  250 mg Oral Daily     fluticasone-vilanterol  1 puff Inhalation Daily     glucagon  1 mg Intravenous Once     levothyroxine  200 mcg Oral Daily     sodium chloride (PF)  3 mL Intracatheter Q8H     umeclidinium  1 puff Inhalation Daily

## 2019-11-27 NOTE — PROGRESS NOTES
Pt discharging home with referrals for home PT/OT and medication management. RN went over discharge instructions with patient at bedside. Picked up medication from discharge pharmacy for the patient. PIV removed at bedside. Patient's family brought her downstairs in wheel chair at time of discharge.    Cathy Bassett RN on 11/27/2019 at 11:52 AM

## 2019-11-27 NOTE — PROGRESS NOTES
Brief Care Coordination Note:    Patient medically ready for discharge. Attempted to meet with patient to sign the Important Medicare Message, patient has already left the hospital. [facility] updated on discharge plan. Outpatient dialysis clinic updated, discharge orders faxed.     Cristiana Lucas   Phone: [phone]  Fax: [phone]    Maggie Saleem, RNCC, BSN    [address] [address]    [email or web address]    Office: [phone] Pager: [phone]  To contact weekend RNCC, dial [phone] and enter pager number [phone] at prompt. This pager can not be contacted by text page or outside line.

## 2019-11-27 NOTE — PLAN OF CARE
6201-3550: Afebrile. VSS on RA ex soft BPs (90s/50s). A/Ox4. Up with SBA + walker to bathroom. Pt admitted for hypotension and CAP. Antibiotics switched to oral augmentin and oral azithromycin today. Dialysis run done this morning. Pt is oliguric. Reports 1 BM today. Reports chronic pain/soreness in R leg. PRN oxycodone given x1 with relief. Dialysis diet with fair appetite. Patient will likely discharge tomorrow. Continue with POC.  [  Cathy Bassett RN on 11/26/2019 at 6:56 PM

## 2019-11-27 NOTE — PLAN OF CARE
Discharge Planner OT   Patient plan for discharge: home  Current status: Pt continues to demonstrate significant cognitive deficits, including providing varying information on PLOF and assist at home and difficulties sequencing. Pt requiring total A to call support persons, difficulties with tracking sequence of numbers to dial. Pt adamantly refusing recommendations for TCU, refusing any OOB/EOB activity.  Barriers to return to prior living situation: deconditioning, cognitive deficits, limited support system and limited insight into deficits  Recommendations for discharge: TCU  Rationale for recommendations: Pt is below baseline and would benefit from continued skilled therapy to increase activity tolerance and independence with ADLs.         Entered by: Pratibha Mercer 11/27/2019 9:07 AM

## 2019-11-27 NOTE — DISCHARGE SUMMARY
Dialysis Discharge Summary Brief    [facility]  Division of Nephrology  Nephrology Discharge Dialysis Orders  Ph: [phone]  Fax: [phone]    Kim Anne  MRN: [id number]  YOB: 1945    [place] [facility]: [facility]  Primary Nephrologist: Agusto    Date of Admission: 11/23/2019  Date of Discharge: 11/27/2019  Discharge Diagnosis: Hypotension and bradycardia    ICD-10-CM    1. ESRD on dialysis (H) N18.6 Methicillin Resistant Staph Aureus PCR    Z99.2 0.9% sodium chloride BOLUS     0.9% sodium chloride BOLUS     heparin (porcine) injection 500 Units     Glucose by meter     Glucose by meter     Glucose by meter     Glucose by meter     Vancomycin level     Glucose by meter     Glucose by meter     Hepatitis B Surface Antibody     Hepatitis B surface antigen     Glucose by meter     Glucose by meter     Glucose by meter     Glucose by meter     Glucose by meter     Glucose by meter     Phosphorus     Glucose by meter     Glucose by meter     DISCONTINUED: 0.9% sodium chloride BOLUS     DISCONTINUED: gelatin absorbable (GELFOAM) sponge 1 each     DISCONTINUED: heparin 10,000 units/10 mL infusion (DIALYSIS USE)     DISCONTINUED: sodium chloride (PF) 0.9% PF flush 9 mL     DISCONTINUED: sodium chloride (PF) 0.9% PF flush 9 mL     DISCONTINUED: sodium chloride (PF) 0.9% PF flush 10 mL     DISCONTINUED: sodium chloride (PF) 0.9% PF flush 10 mL     DISCONTINUED: alteplase (CATHFLO ACTIVASE) injection 2 mg     DISCONTINUED: alteplase (CATHFLO ACTIVASE) injection 2 mg   2. Hypotension, unspecified hypotension type I95.9 ABO/Rh type and screen     UA reflex to Microscopic and Culture     Blood Culture ONE site     Erythrocyte sedimentation rate auto     Blood culture     Vancomycin level     Blood culture     Blood culture     Glucose by meter     Glucose by meter     Glucose by meter     Glucose by meter     CBC with platelets     Basic metabolic panel      Magnesium     Glucose by meter     Glucose by meter     Sputum Culture Aerobic Bacterial     Gram stain     Influenza A and B and RSV PCR     Legionella pneumonia antigen urine     Glucose by meter     Glucose by meter     Glucose by meter     Glucose by meter     Vancomycin level     Glucose by meter     Glucose by meter     Basic metabolic panel     CBC with platelets     Glucose by meter     Glucose by meter     Glucose by meter     Glucose by meter   3. ESRD (end stage renal disease) on dialysis (H) N18.6     Z99.2    4. Acute on chronic respiratory failure with hypoxia (H) J96.21    5. Cigarette smoker F17.210    6. Chronic bronchitis, unspecified chronic bronchitis type (H) J42 I have reviewed and agree with all the recommendations and orders detailed in this document.     I have reviewed and agree with all the recommendations and orders detailed in this document.   7. Pulmonary emphysema, unspecified emphysema type (H) J43.9 I have reviewed and agree with all the recommendations and orders detailed in this document.     I have reviewed and agree with all the recommendations and orders detailed in this document.   8. Pneumonia of both lower lobes due to infectious organism (H) J18.1 amoxicillin-clavulanate (AUGMENTIN) 500-125 MG tablet       [] New initiation, new dialysis orders will be faxed.    [x] Resume all previous dialysis orders with exception as noted below    New Orders (if not applicable put NA):  Estimated Dry Weight 63   Dialysis Duration    Dialysis Access    Antibiotics (dose per dialysis, end date)            Labs to be drawn at dialysis    Other major changes to dialysis prescription (e.g. Dialysate bath, heparin, blood flow rate, etc)      Medication changes (also fax the unit a copy of the discharge summary)         Stop carvedilol     Name of physician completing this form: Rasheeda Liz MD, MD

## 2019-11-27 NOTE — PLAN OF CARE
"Time 1900-0730     Reason for admission: unintentional overdose of beta blocker     Vitals: VSS on RA    /44 (BP Location: Right leg)   Pulse 71   Temp 97.9  F (36.6  C) (Oral)   Resp 16   Ht 1.651 m (5' 5\")   Wt 64.1 kg (141 lb 6.4 oz)   SpO2 97%   BMI 23.53 kg/m       Activity: SBA, Walker  Pain: c/o generalized joint pain and localized pain at R fistula surgical site; declined pain medication  Neuro: A&Ox4; forgetful  Cardiac: bradycardic on tele  Respiratory: productive frequent cough  GI/: oliguric on HD. no BM on shift. Denies nausea.  Diet: Dialysis diet  Lines: fistula R arm. R subclavian HD cath. PIV.   Skin: localized redness at R fistula surgical site. Blanchable redness to coccyx  Labs: Cr 4.98  New this shift: Up to bathroom. Bed alarm on as pt not calling for RN inconsistently. Slept well overnight.     Plan: pt declining discharge to TCU and requesting discharge home with services. Possible discharge today.     Continue to monitor and follow POC  "

## 2019-11-27 NOTE — PLAN OF CARE
5A  Discharge Planner PT   Patient plan for discharge: home  Current status: pt encountered ambulating with 4WW from bathroom with RN staff. Although initially agreeable to attempt stairs as pt strongly desiring to return to home. Despite multiple attempts to encourage pt, pt ultimately declines any stairs and begins to yell at therapist. Declines any additional activity and ambulates back to room with 4WW and SBA.   Barriers to return to prior living situation: stairs, cognition, safety concerns.   Recommendations for discharge: TCU  Rationale for recommendations: pt has not demonstrated adequate safety with mobility for return to home. Poor historian and contradicting number of stairs provided - today states 15 stairs needed to enter apartment however with OT reports only 4.        Entered by: Efrain Looney 11/27/2019 8:35 AM        Physical Therapy Discharge Summary    Reason for therapy discharge:    Discharged to home however recommended to TCU    Progress towards therapy goal(s). See goals on Care Plan in [facility] health record for goal details.  Goals not met.  Barriers to achieving goals:   discharge from facility.    Therapy recommendation(s):    Continued therapy is recommended.  Rationale/Recommendations:  for home safety evaluation and progession of safety with mobility. .

## 2019-11-28 NOTE — DISCHARGE SUMMARY
[facility] [place]  Hospitalist Discharge Summary       Date of Admission:  11/23/2019  Date of Discharge:  11/27/2019 10:46 AM  Discharging Provider: Rui Guerrero MD  Discharge Team: Hospitalist Service, [place]     Discharge Diagnoses   Pneumonia   Hypotension     Follow-ups Needed After Discharge   {Additional follow-up instructions/to-do's for PCP    :Post discharge follow up     Unresulted Labs Ordered in the Past 30 Days of this Admission     Date and Time Order Name Status Description    11/23/2019 1003 T4 free In process       These results will be followed up by PCP    Discharge Disposition   Discharged to home  Condition at discharge: Stable    Hospital Course    Patient was admitted to the hospital after she presented with Low BP without any symptoms.   She was found to have infiltrates in the lung and was started on IV antibiotics, admitted to ICU and was followed.   Later when BP was checked on legs it was found to be WNL. And patient was transferred to the floor.   Patient continue to feel and do better   Patient was getting dialysis in the hospital   Patient was discharged home with home health and oral antibiotics to finish the course of pneumonia treatment        Consultations This Hospital Stay   PHARMACY TO DOSE [facility] SERVICES IP CONSULT  SOCIAL WORK IP CONSULT  VASCULAR ACCESS CARE ADULT IP CONSULT  NEPHROLOGY ESRD ADULT IP CONSULT  PHYSICAL THERAPY ADULT IP CONSULT  OCCUPATIONAL THERAPY ADULT IP CONSULT  PHARMACY TO DOSE [facility]  VASCULAR ACCESS CARE ADULT IP CONSULT    Code Status   Prior    Time Spent on this Encounter   I, Rui Guerrero MD, personally saw the patient today and spent greater than 30 minutes discharging this patient.       Rui Guerrero MD  [facility] [place]  ______________________________________________________________________    Physical Exam   Vital Signs:                    Weight: 141 lbs 6.4  oz  Constitutional: awake, alert, cooperative, no apparent distress, and appears stated age  ENT: Normocephalic, without obvious abnormality, atraumatic, sinuses nontender on palpation, external ears without lesions, oral pharynx with moist mucous membranes, tonsils without erythema or exudates, gums normal and good dentition.  Hematologic / Lymphatic: no cervical lymphadenopathy  Respiratory: No increased work of breathing, good air exchange, clear to auscultation bilaterally, no crackles or wheezing  Cardiovascular: Normal apical impulse, regular rate and rhythm, normal S1 and S2, no S3 or S4, and no murmur noted  GI: No scars, normal bowel sounds, soft, non-distended, non-tender, no masses palpated, no hepatosplenomegally  Neurologic: Awake, alert, oriented to name, place and time.  Cranial nerves II-XII are grossly intact.  Motor is 5 out of 5 bilaterally.  Cerebellar finger to nose, heel to shin intact.  Sensory is intact.  Babinski down going, Romberg negative, and gait is normal.       Primary Care Physician   Ailyn Nieves    Discharge Orders      Medication Therapy Management Referral      Home care nursing referral      Home Care PT Referral for Hospital Discharge      Home Care OT Referral for Hospital Discharge      MD face to face encounter    Documentation of Face to Face and Certification for Home Health Services    I certify that patient: Kim Anne is under my care and that I, or a nurse practitioner or physician's assistant working with me, had a face-to-face encounter that meets the physician face-to-face encounter requirements with this patient on: 11/26/2019.    This encounter with the patient was in whole, or in part, for the following medical condition, which is the primary reason for home health care: below functional mobility and hypotensive.    I certify that, based on my findings, the following services are medically necessary home health services: Nursing, Occupational  Therapy and Physical Therapy.    My clinical findings support the need for the above services because: Nurse is needed: To provide assessment and oversight required in the home to assure adherence to the medical plan due to: blood pressure./ Occupational Therapy Services are needed to assess and treat cognitive ability and address ADL safety due to impairment in functional mobitlity. and Physical Therapy Services are needed to assess and treat the following functional impairments: functional mobility.    Further, I certify that my clinical findings support that this patient is homebound (i.e. absences from home require considerable and taxing effort and are for medical reasons or [facility] services or infrequently or of short duration when for other reasons) because: Requires assistance of another person or specialized equipment to access medical services because patient: Requires supervision of another for safe transfer...    Based on the above findings. I certify that this patient is confined to the home and needs intermittent skilled nursing care, physical therapy and/or speech therapy.  The patient is under my care, and I have initiated the establishment of the plan of care.  This patient will be followed by a physician who will periodically review the plan of care.  Physician/Provider to provide follow up care: Ailyn Nieves    Attending hospital physician (the Medicare certified [facility] provider): Rui Guerrero MD  Physician Signature: See electronic signature associated with these discharge orders.  Date: 11/26/2019       Significant Results and Procedures   Results for orders placed or performed during the hospital encounter of 11/23/19   XR Chest Port 1 View    Narrative    EXAM: XR CHEST PORT 1 VW  11/23/2019 10:52 AM     HISTORY:  cough hypotension       COMPARISON:  10/1/2019    FINDINGS:   Single upright frontal radiograph of the chest. Right large bore  venous catheter tip projects at the mid  SVC.    The trachea is midline. The cardiomediastinal silhouette is  well-defined..    Acute airspace opacity. Left pleural effusion. No pneumothorax.    Included osseous structures and soft tissues and upper abdomen are  within normal limits.      Impression    IMPRESSION: No acute airspace opacity. Small left pleural effusion.    I have personally reviewed the examination and initial interpretation  and I agree with the findings.    RAPHAEL BUTLER MD   POC US ECHO LIMITED    Impression    Limited Bedside Cardiac Ultrasound, performed and interpreted by me.   Indication: Hypotension/shock.  Parasternal long axis, parasternal short axis, apical 4 chamber and subcostal views were acquired.   Image quality was satisfactory.    Findings:    Abnormal ?decreased LVEF on some view    IMPRESSION: Abnormal limited cardiac ultrasound showing ?LVEF mildly low.  No pericardial effusion.   CT Chest Abdomen Pelvis w/o Contrast    Narrative    EXAMINATION: CT CHEST ABDOMEN PELVIS W/O CONTRAST  11/23/2019 2:53 PM       CLINICAL HISTORY: Sepsis    COMPARISON: CT chest 5/25/2019, CT pelvis 4/12/2018        PROCEDURE COMMENTS: CT of the chest, abdomen, and pelvis was performed  withou intravenous and oral contrast. Axial MIP  images of the chest,  and coronal and sagittal reformatted images of the chest, abdomen, and  pelvis obtained.    FINDINGS:    Support devices: Tip of the right-sided double-lumen central venous  catheter in the cavoatrial junction. Proximal left subclavian artery  stent.    Chest: Normal heart size. No significant pericardial effusion. Few  prominent mediastinal lymph nodes without lymphadenopathy. Trachea  midline. Scattered micronodules in tree-in-bud opacities in the right  middle lobe and right greater than left upper lobes. Mild reticulation  in the peripheral right lower and left lower lobes. Mild linear  scarring in the lingula, medial right middle lobe, and posterolateral  right upper lobe.  Dependent atelectasis. No focal consolidation. Trace  residual pleural effusions. Right lower lobe predominant bronchial  wall thickening and mucous plugging.    Abdomen/pelvis:  Normal liver. Cholecystectomy. Atrophic pancreas. Lobulated fluid  attenuation medial to the spleen near the pancreatic tail measuring up  to 3.2 cm (series 5 image 298), similar to 5/25/2019. Surgical changes  of left nephrectomy. Moderate perinephric fat stranding. No  hydronephrosis, hydroureter, or urinary tract stone. Several vascular  calcifications are noted about the distal right ureter. Urinary  bladder is decompressed with diffuse bilateral thickening.    Small hiatal hernia. Distended stomach. Small and large bowels are  nonobstructed. Appendectomy, hysterectomy, and bilateral  salpingooophorectomy. No pneumoperitoneum no abdominal ascites.    Soft tissue/bones: Advanced degenerative changes in the L5-S1 with  posterior disc bulge. Severe osteopenic bony appearance.      Impression    IMPRESSION:  1. Upper lung predominant micronodules and scattered tree-in-bud  opacities, atypical infection versus respiratory bronchiolitis  (smoking related injury) versus, less likely, hypersensitivity  pneumonitis. Associated right lower lobe predominant bronchial wall  thickening and mucous plugging. Near resolution of the previously seen  pleural effusions on 5/25/2019 CT.  2. Lobulated fluid attenuation medial to the spleen is grossly  unchanged compared to 5/25/2019 and favored benign given stability.  Limited evaluation secondary to lack of IV contrast. Differential  includes a lymphangioma. This could be further evaluated with MRI on a  nonemergent basis, if clinically indicated.  3. Moderate nonspecific right perinephric fat stranding. Correlate for  urinary tract infection/pyelonephritis. No appreciable urinary tract  stone or hydronephrosis.    I have personally reviewed the examination and initial interpretation  and I agree with the  findings.    RADHA LOYD, DO   US Ext Arterial Venous Dialys Acs Graft    Narrative    RIGHT ARM DIALYSIS FISTULA DUPLEX ULTRASOUND 11/25/2019    CLINICAL HISTORY: Right arm swelling. Pain. Right brachial-cephalic  dialysis fistula created 11/22/2019.    COMPARISONS: None available.    REFERRING PROVIDER: ANDREW ALVAREZ GOODWIN    TECHNIQUE: Grayscale, color Doppler, Doppler waveform ultrasound  evaluation of the right arm brachial-cephalic dialysis circuit.    FINDINGS: RIGHT:  Internal jugular vein: 18 cm/s, phasic, fully compressible    Brachial artery, upper arm: 211/69 cm/s, monophasic, brisk upstroke  Brachial artery, mid arm: 289/107 cm/s, monophasic, brisk upstroke  Brachial artery, antecubital fossa: 298/101 cm/s, monophasic, brisk  upstroke  Brachial artery, antecubital fossa: 4.2 mm diameter, 904 mL/min  VolFlow    Brachial artery, inferior to anastomosis: 130/0 cm/s, triphasic,  antegrade    Radial artery, origin: 53/0 cm/s, triphasic  Ulnar artery: 73 cm/s, triphasic    Arteriovenous anastomosis: 851/344 cm/s, 1.7 mm diameter  Cephalic vein, central to anastomosis: 252/72 cm/s    Cephalic vein, above antecubital fossa: 199/91 cm/s  Cephalic vein, above antecubital fossa: 5.3 mm diameter, 982 mL/min  VolFlow    Cephalic vein, mid arm: 106/37 cm/s  Cephalic vein, upper arm: 97/42 cm/s  Cephalic vein, shoulder: 129/49 cm/s    Cephalic arch: 413/224 cm/s  Cephalic-subclavian confluence: 261/112 cm/s    Axillary vein: 33 cm/s, phasic, retrograde    Subclavian vein, mid: 97 cm/s  Subclavian vein, central: 67 cm/s    Innominate vein: 60 cm/s      Impression    IMPRESSION:  1. Arteriovenous anastomotic narrowing. 1.7 mm diameter. Almost  tripling of peak systolic velocity (2.9 times) across the anastomosis  with peak systolic velocity of 851 cm/s.    2. More than 600 mL/min flow volume through the cephalic vein.    3. Cephalic arch narrowing. More than tripling of peak systolic  velocity form the cephalic  vein in the shoulder to the arch.     JHOAN BELL MD   Echocardiogram Complete    Narrative    [id number]  [id number]  [id number]  [id number]^HARMONY^EMY           [facility]  Echocardiography Laboratory  [address] [place] [address]     Name: JONEL CHAVEZ  MRN: [id number]  : 1945  Study Date: 2019 11:45 AM  Age: 74 yrs  Gender: Female  Patient Location: [facility]  Reason For Study: Dyspnea, hypotension  Ordering Physician: EMY ANTUNEZ  Performed By: HEMAL Larson     BSA: 1.6 m2  Height: 65 in  Weight: 130 lb  BP: 75/45 mmHg  _____________________________________________________________________________  __        Procedure  Complete Portable Echo Adult.  _____________________________________________________________________________  __        Interpretation Summary  Global and regional left ventricular function is hyperkinetic with an EF of  65-70%.  The pattern of wall thickening is consistent with hypertrophic cardiomyopathy.  Basal septum measures 1.9 cm. LVOT gradient is 33 mmHg at rest. No RICHELLE or MR.  Global right ventricular function is normal.  IVC diameter <2.1 cm collapsing >50% with sniff suggests a normal RA pressure  of 3 mmHg.  No pericardial effusion is present.  No change from prior study.  _____________________________________________________________________________  __        Left Ventricle  Global and regional left ventricular function is hyperkinetic with an EF of  65-70%. Left ventricular size is normal. The pattern of wall thickening is  consistent with hypertrophic cardiomyopathy. Grade II or moderate diastolic  dysfunction.     Right Ventricle  The right ventricle is normal size. Global right ventricular function is  normal. There is mild right ventricular hypertrophy.     Atria  Both atria appear normal.        Mitral Valve  Trace mitral insufficiency is present.     Aortic Valve  The aortic valve is tricuspid. Mild aortic valve  calcification is present.     Tricuspid Valve  Trace tricuspid insufficiency is present. The peak velocity of the tricuspid  regurgitant jet is not obtainable.     Pulmonic Valve  The pulmonic valve is normal.     Vessels  The aorta root is normal. IVC diameter <2.1 cm collapsing >50% with sniff  suggests a normal RA pressure of 3 mmHg.     Pericardium  No pericardial effusion is present.     _____________________________________________________________________________  __  MMode/2D Measurements & Calculations  IVSd: 1.5 cm  LVIDd: 4.3 cm  LVIDs: 2.8 cm  LVPWd: 1.1 cm     FS: 34.9 %  LV mass(C)d: 213.8 grams  LV mass(C)dI: 129.8 grams/m2  Ao root diam: 3.2 cm  LVOT diam: 2.2 cm  LVOT area: 3.8 cm2  LA Volume (BP): 38.4 ml  LA Volume Index (BP): 23.3 ml/m2  RWT: 0.53        Doppler Measurements & Calculations  MV E max alberto: 69.4 cm/sec  MV A max alberto: 75.2 cm/sec  MV E/A: 0.92     MV dec slope: 210.0 cm/sec2  MV dec time: 0.33 sec  PA V2 max: 111.0 cm/sec  PA max P.9 mmHg  PA acc time: 0.12 sec  E/E' avg: 15.8  Lateral E/e': 16.0  Medial E/e': 15.6     _____________________________________________________________________________  __           Report approved by: Enrique Barnett 2019 12:30 PM        *Note: Due to a large number of results and/or encounters for the requested time period, some results have not been displayed. A complete set of results can be found in Results Review.       Discharge Medications   Discharge Medication List as of 2019  9:20 AM      START taking these medications    Details   amoxicillin-clavulanate (AUGMENTIN) 500-125 MG tablet Take 1 tablet by mouth every 24 hours, Disp-5 tablet, R-0, E-Prescribe         CONTINUE these medications which have NOT CHANGED    Details   albuterol (PROAIR HFA/PROVENTIL HFA/VENTOLIN HFA) 108 (90 Base) MCG/ACT inhaler Inhale 2 puffs into the lungs every 6 hours as needed for shortness of breath / dyspnea or wheezing, Disp-18 g, R-3,  E-Prescribe      albuterol (PROVENTIL) (2.5 MG/3ML) 0.083% neb solution Take 1 vial (2.5 mg) by nebulization every 6 hours as needed for shortness of breath / dyspnea or wheezing, Disp-180 mL, R-3, E-Prescribe      ammonium lactate (LAC-HYDRIN) 12 % external lotion Apply topically 2 times dailyDisp-225 g, [id number]      ASPIR-LOW 81 MG EC tablet Take 1 tablet (81 mg) by mouth daily, Disp-90 tablet, R-3, AMADEO, E-Prescribe      atorvastatin (LIPITOR) 40 MG tablet Take 1 tablet (40 mg) by mouth daily, Disp-90 tablet, R-1, E-Prescribe      blood glucose monitoring (FREESTYLE LITE) test strip TEST BLOOD SUGAR TWO TIMES A DAY, Disp-50 strip, R-12, E-Prescribe      blood glucose monitoring (FREESTYLE) lancets Test BS two times daily as directed, Disp-100 each, R-1, E-Prescribe      docusate sodium (COLACE) 100 MG capsule Take 200 mg by mouth daily Pt last took 11/19/19, Disp-60 capsule, R-4, Historical      Emollient (EUCERIN CALMING DAILY MOIST) CREA Externally apply 1 dose. topically dailyDisp-1 Tube, [id number]      fluticasone-salmeterol (ADVAIR) 250-50 MCG/DOSE inhaler Inhale 1 puff into the lungs every 12 hours, Disp-1 Inhaler, R-5, E-Prescribe      levothyroxine (SYNTHROID/LEVOTHROID) 200 MCG tablet Take 1 tablet (200 mcg) by mouth daily, Disp-90 tablet, R-1, E-Prescribe      multivitamin RENAL (NEPHROCAPS/TRIPHROCAPS) 1 MG capsule Take 1 capsule by mouth daily Pt doesn't recall last dose 11/20/19, Historical      nitroGLYcerin (NITROSTAT) 0.4 MG sublingual tablet Place 1 tablet (0.4 mg) under the tongue every 5 minutes as needed for chest pain, Disp-25 tablet, R-0, E-Prescribe      nystatin (MYCOSTATIN) 572228 UNIT/GM POWD Apply 1 g topically 3 times daily as neededDisp-30 g, [id number]      !! order for DME Equipment being ordered: NebulizerDisp-1 Device, R-0, Local Print      !! order for DME Equipment being ordered: Boost.Disp-6 Can, R-3, Local Print      !! order for DME Equipment being ordered:  caneDisp-1 each, R-0, Local Print      !! order for DME Equipment being ordered: Diabetic ShoesDisp-1 each, R-0, Local Print      !! order for DME Equipment being ordered: two pairs moderate knee high support hoseDisp-2 Device, R-1, Local Print      !! order for DME Equipment being ordered: Compression socks.  Strength:15-20 mmHgDisp-2 each, R-0, Local Print      !! order for DME One wheeled walker with seat and brakes and basketDisp-1 Device, R-0, Local Print      oxyCODONE IR (ROXICODONE) 10 MG tablet Take 0.5-1 tablets (5-10 mg) by mouth every 8 hours as needed for moderate to severe pain, Disp-90 tablet, R-0, E-Prescribe      polyethylene glycol (MIRALAX/GLYCOLAX) packet Take 17 g by mouth daily as needed for constipation, Disp-10 packet, R-0, E-Prescribe      tiotropium (SPIRIVA) 18 MCG inhaled capsule Inhale 1 capsule (18 mcg) into the lungs daily, Disp-30 capsule, R-11, E-Prescribe      vitamin D3 (CHOLECALCIFEROL) 2000 units (50 mcg) tablet Take 1 tablet (2,000 Units) by mouth daily, Disp-90 tablet, R-3, E-Prescribe       !! - Potential duplicate medications found. Please discuss with provider.      STOP taking these medications       carvedilol (COREG) 6.25 MG tablet Comments:   Reason for Stopping:         furosemide (LASIX) 20 MG tablet Comments:   Reason for Stopping:         traMADol (ULTRAM) 50 MG tablet Comments:   Reason for Stopping:             Allergies   Allergies   Allergen Reactions     Contrast Dye Hives and Itching     Clonidine      She had as IP and thinks it made her itchy     Diatrizoate Other (See Comments)     Diltiazem      Severe bradycardia     Iodine-131

## 2019-11-29 ENCOUNTER — CARE COORDINATION (OUTPATIENT)
Dept: FAMILY MEDICINE | Facility: CLINIC | Age: 74
End: 2019-11-29

## 2019-11-29 LAB
BACTERIA SPEC CULT: NO GROWTH
Lab: NORMAL
SPECIMEN SOURCE: NORMAL

## 2019-11-29 NOTE — PLAN OF CARE
Occupational Therapy Discharge Summary    Reason for therapy discharge:    Discharged to home. Therapy was recommending discharge to TCU.    Progress towards therapy goal(s). [facility] for goal details.  Goals partially met.  Barriers to achieving goals:   discharge from facility.    Therapy recommendation(s):    Continued therapy is recommended.  Rationale/Recommendations:  Pt would benefit from  OT to ensure safety and independence with ADL.

## 2019-12-02 ENCOUNTER — PATIENT OUTREACH (OUTPATIENT)
Dept: GERIATRIC MEDICINE | Facility: CLINIC | Age: 74
End: 2019-12-02

## 2019-12-02 DIAGNOSIS — G89.29 CHRONIC LOW BACK PAIN WITHOUT SCIATICA, UNSPECIFIED BACK PAIN LATERALITY: ICD-10-CM

## 2019-12-02 DIAGNOSIS — M54.50 CHRONIC LOW BACK PAIN WITHOUT SCIATICA, UNSPECIFIED BACK PAIN LATERALITY: ICD-10-CM

## 2019-12-02 NOTE — TELEPHONE ENCOUNTER
Controlled Substance Refill Request for oxyCODONE IR (ROXICODONE) 10 MG tablet  Problem List Complete:  Yes   checked in past 3 months?  No, route to RN

## 2019-12-02 NOTE — TELEPHONE ENCOUNTER
Requested Prescriptions   Pending Prescriptions Disp Refills     oxyCODONE IR (ROXICODONE) 10 MG tablet 90 tablet 0     Sig: Take 0.5-1 tablets (5-10 mg) by mouth every 8 hours as needed for moderate to severe pain       There is no refill protocol information for this order        Last Written Prescription Date:  11/3/19  Last Fill Quantity: 90,   # refills: 0  Last Office Visit: na  Future Office visit:    Next 5 appointments (look out 90 days)    Dec 06, 2019  8:30 AM CST  Return Visit with JUNIOR Bishop CNP  [facility] [address]   [address]  [facility] [id number] [phone]           Routing refill request to provider for review/approval because:  Drug not on the [facility], [facility] or [facility] refill protocol or controlled substance

## 2019-12-02 NOTE — PROGRESS NOTES
"[facility] Care Coordination Contact    Rec'd e-mail from Remberto RIVERA [facility] stating that her episode was closed due to the Clinical Coordinator being unable to get a hold of the pt and the pt never returning phone calls.    Per Remberto, If she wishes to have Home Care a new referral from her PCP is needed.     Call placed back to Kim to review the above information. Kim would like a nurse visit, \"I sometimes forget to take the pills.\"  Kim again states that she has no medications at home. Kim states that she took her last pill today for pneumonia.   Kim said she would like help with preparing meals and possibly having meals delivered to her home.  Explained that CC will need to complete an assessment, Kim agreeable. Home visit scheduled for 12/4/19 @ 12:00.    Explained that CC will send message to PCP to request a new order for [facility].  Sakina Carrion RN, BC  Manager [facility] Care Coordinator   [phone]  (Fax)       "

## 2019-12-03 ENCOUNTER — TELEPHONE (OUTPATIENT)
Dept: FAMILY MEDICINE | Facility: CLINIC | Age: 74
End: 2019-12-03

## 2019-12-03 ENCOUNTER — PATIENT OUTREACH (OUTPATIENT)
Dept: GERIATRIC MEDICINE | Facility: CLINIC | Age: 74
End: 2019-12-03

## 2019-12-03 RX ORDER — OXYCODONE HYDROCHLORIDE 10 MG/1
5-10 TABLET ORAL EVERY 8 HOURS PRN
Qty: 90 TABLET | Refills: 0 | OUTPATIENT
Start: 2019-12-03

## 2019-12-03 NOTE — PROGRESS NOTES
"[facility] Care Coordination Contact  Call placed to Dialysis, spoke with Althea to inquire if member made it to her appt. Althea states that member was at dialysis, stating that member is rarely on time. Althea expressed concern that member continues to drive herself to Dialysis, stating that she would benefit from more help.  Shared that member had reported that she has no medications at home. Althea brought telephone to member who stated that she does not have medications at home and does not know where there are at. Althea shared that she had member bring in all of her medication bottles and she provided education and assisted with labeling the bottles. Inquired which pharmacy member utilizes, member stated, \"the [place] one on [address]    Call placed to [facility] [phone]. CC informed that last medication refill was 11/21/19 (numerous medications filled), CC was informed that she is not able to read the signature of the person that picked up the medications.  Medications ordered by PCP, KENDRICK Nieves.  CC was informed that member may be able to refill the medications for a partial supply.   Call placed back to Dialysis unit spoke with Althea to ask member who would have picked up her medications. Member states that she does not know. Member gave CC permission to contact her friend Ginette.  CC attempted to reach Ginette, but the number is member's cell number and there was no answer.   Call placed to PCC to share above info, request new referral for [facility].  VM left with Ember SPANGLER, noted that member has a Telemedicine appt scheduled for 12/4/19 @ 10 AM. Introduced myself and shared that member reports that she does not have any medications.. Explained that member has an appt with PCP on 12/6/19 and CC has requested new orders for [facility].  Request a call back.    Attempted to reach member, no answer, not able to leave a vm.     Rec'd tele call from Roxanne, nursing at [facility]. Explained that a new " referral for [facility] is required due to member not returning calls to schedule an initial assessment.  Roxanne will reach out to [facility].    Rec'd vm from Roxanne stating that she called member's home, stating that a small child answered the phone and said that Kim was not available.  Roxanne explained that it is important that Selam call the PCC.   [phone]  Sakina Carrion RN, BC  Manager [facility] Coordinator   [phone]  (Fax)

## 2019-12-03 NOTE — TELEPHONE ENCOUNTER
"Per Sakina  Care Coordinator, pt did not answer phone after 5 attempts were made. Case was then closed.  Kim has an appt 12/6. Pt has no showed her last 3 appts & was last seen 8/6.  HC is requesting pt be seen & then send referral.  Per Sakina pt has \"no meds at all\"   Place a call to pt home, a child answered stating\" Kim is not available\" I did leave message that it was of high importance that pt call clinic.    Sakina will be going out to pt home tomorrow as a welfare check, there is a probability that an adult protection case report will be filed.    Routing to PCP  Roxanne Leyva RN  "

## 2019-12-04 ENCOUNTER — PATIENT OUTREACH (OUTPATIENT)
Dept: GERIATRIC MEDICINE | Facility: CLINIC | Age: 74
End: 2019-12-04

## 2019-12-04 ENCOUNTER — TELEPHONE (OUTPATIENT)
Dept: FAMILY MEDICINE | Facility: CLINIC | Age: 74
End: 2019-12-04

## 2019-12-04 ENCOUNTER — TELEPHONE (OUTPATIENT)
Dept: PHARMACY | Facility: CLINIC | Age: 74
End: 2019-12-04

## 2019-12-04 NOTE — TELEPHONE ENCOUNTER
Routed to Yakelin Leach, [facility] Geriatric MTM pharmacist, to try to reach out to patient.    Allyssa Matamoros, PharmD  Medication Therapy Management Pharmacist

## 2019-12-04 NOTE — TELEPHONE ENCOUNTER
MTM referral from: Transitions of Care (recent hospital discharge or ED visit)    MTM referral outreach attempt #2 on December 4, 2019 at 2:09 PM      Outcome: Initially, patient scheduled for a telephone MTM appointment on Monday, 12/02/2019 with [facility], but appointment was a no show. We reached out to patient and rescheduled appointment to Wednesday, 12/04/2019 via telephone, but appointment was another no show. We've reached our max attempts on reaching out to patient. Will route message to MTM Pharmacist/Provider as a FYI. Thank you,    See [facility] Coordinator

## 2019-12-04 NOTE — TELEPHONE ENCOUNTER
Patient had a transition of care MTM visit scheduled today at 10:00.  I called patient multiple times but was unable to reach the patient.  The phone number on file just keeps ringing.  She does not have a voicemail so was unable to leave my contact information for her to call me back.  I can try to call the patient later today when I have an opening however I do not have any 1 hour time slot but at least could do a quick medication review and address any issues or concerns the patient has.  If I do not get a hold of the patient today, I reach out to Yakelin Leach the [facility] and [place] geriatric MTM pharmacist to see if she could help in reaching this patient.    Sakina the [facility] care coordinator called me yesterday and left a message to let me know that she talked to the patient and patient reports she has no medications at her home.  See patient outreach telephone encounter from 12/3/2019.  Sakina did say that she has an appointment with the patient today at 12:00.  I tried to call Sakina to touch base but had to leave a voicemail.  I will try to call her again later today as well.      Allyssa Matamoros, PharmD  Medication Therapy Management Pharmacist

## 2019-12-04 NOTE — TELEPHONE ENCOUNTER
I tried calling pt again but no answer.    Allyssa Matamoros, PharmD  Medication Therapy Management Pharmacist

## 2019-12-05 ENCOUNTER — PATIENT OUTREACH (OUTPATIENT)
Dept: GERIATRIC MEDICINE | Facility: CLINIC | Age: 74
End: 2019-12-05

## 2019-12-05 NOTE — PROGRESS NOTES
[facility] Care Coordination Contact    Went with Sakina Carrion RN to assess how member is doing since discharged from the hospital and reports of member not having any of her medications. Below are this Care Coordinator's observation of the home environment:  Upon arrival to the home, the sidewalk and walk ways to and around the home were not shoveled, making it unsafe pathways to the home. Upon entering the home, there was a strong odor of cigarette smoke and cat. A gentleman answered the door and stated that he did not live there, but was working on a project out back. Upon entering the front room there were 2 other gentleman sitting in the front living room, and it was unclear their relation to member. The front living room was cluttered (if not hoarded) with dirty clothing, garbage and worn out furniture. There were heavy curtains blocking the doorway from the main living room, to the sitting room between the living room and kitchen. The sitting room had a single bed in the room that was covered in dirty clothing, and there were no bedding on the mattress. There was also a couch in the sitting room and this was clear of belongings but was very dirty. There were also racks of clothing hanging in the sitting room as well as two large bikes sitting on the couc. The stairs to the upper level was in the sitting room. There was no carpet on any of the taylor and the baseboards were filthy dirty and sticky. The walls were all written on and cracked and pealing plaster in all rooms, or half painted walls where the paint was not able to cover and was still able to see through the paint to what was beneath it. There was garbage on the floors, and hanging on door handles. There was dirty laundry in every room, and piles of clothing in corners and closets as well as in the hallways. Member s room had large drawings done in a blue paint all over the walls. There also appeared to be writing by small children all  over the walls. The room had a cluttered floor that would be hazardous for safe ambulation. Member s bed was   made, and still had the plastic wrap on the mattress, of which  was   laying on.  was smoking near her bed (or in her bed), as there was an ja tray on her bedside table. Over the course of the visit with , a granddaughter was in the home, member s daughter, along with four other males (unclear of their relationship). When asked who lives in the home  states that only she, her son, and grandson live in the home.  This  s (SARABJIT Horowitz) observations are that the home is not suitable for habitation due to the level of garbage, soiled clothing, un-kept home maintenance, plaster walls cracking and pealing, no clear pathways within the home.  SARABJIT Horowitz  [facility]  [phone]

## 2019-12-05 NOTE — PROGRESS NOTES
[facility] Care Coordination Contact    Attempted to reach member to remind her of her PCP appt tomorrow at 8:30. No answer, unable to leave vm.  Sakina Carrion RN, BC  Manager [facility] Care Coordinator   [phone]  (Fax)

## 2019-12-05 NOTE — PROGRESS NOTES
[facility] Care Coordination Contact    Due to member's refusal of TCU, her inability to secure her medications, not taking her medications and unsafe living environment a VA report was made. VA report #: [id number].  Called Admissions at [facility] ([phone]) and left a message stating that member has declined the placement option.  Routed message to PCP.  SARABJIT Horowitz  [facility]  [phone]

## 2019-12-05 NOTE — PROGRESS NOTES
"[facility] Care Coordination Contact    Received a call from the [facility] medical director (Dr. Clint Cosme) after a request for review of appropriateness for admission to SNF. Dr. Cosme was in agreement that member meets the SNF level of care for admission to a TCU. Dr. Cosme requested that the Care Coordinator team reach out to the PCP about direct admission orders.   Also received a call from [facility] admissions (Jessica: [phone]). Jessica shared that they do have a bed for  and will accept her. Reviewed with Jessica that this Care Coordinator will reach out to the PCP (Ailyn Nieves) and inquire if the PCP is comfortable with doing direct orders today or if member would need to be seen in clinic before any orders are written, sharing that  does have an appointment scheduled on 12/6/19 at 8:30am. Jessica shared to keep her posted on what the out come is with the PCP and they will plan for admission.  Called the PCP clinic ([facility]: [phone]), and requested to speak with the PCP RN, Roxanne. Had to leave a message for Roxanne, requesting a return call on this Care Coordinator's cell phone.   is currently at Dialysis ([phone]).  Called the dialysis center to be able to update member on what is going on. Spoke with the dialysis RN (Alek) on duty today and he shared that member is currently being taken off the machine now, and is getting ready to leave. Alek passed the phone to member. Re-introduced self to member and member stated that she spoke with all of her family yesterday and she doesn't want to go to the TCU and stated \"I don't want to do that and I don't want to go\".  Attempted to ask member why she doesn't want to go and member hung up the phone.   SARABJIT Horowitz  [facility]  [phone]  Cell: [phone]  "

## 2019-12-05 NOTE — TELEPHONE ENCOUNTER
Attempted call to patient, and phone kept ringing with inability to leave a voice mail.  Will inform , Sakina Carrion.  Several attempts have been made previously by Ember Matamoros to reach patient without success.    Addendum:  Noted Pili Day's encounter in [facility] and I called and spoke with Pili.  Pili and Sakina are aware that patient is not taking any medications currently, and are working to communicate this to provider as well; is not taking meds prescribed at discharge or any meds per Pili.  I have asked Pili to inform me if there is anything more I can do, and if pt agrees to MTM visit.     Yakelin Leach, Pharm.D.,[facility]  Board Certified Geriatric Pharmacist  Medication Therapy Management Pharmacist  [phone]

## 2019-12-06 ENCOUNTER — OFFICE VISIT (OUTPATIENT)
Dept: TRANSPLANT | Facility: CLINIC | Age: 74
End: 2019-12-06
Attending: CLINICAL NURSE SPECIALIST
Payer: COMMERCIAL

## 2019-12-06 ENCOUNTER — OFFICE VISIT (OUTPATIENT)
Dept: FAMILY MEDICINE | Facility: CLINIC | Age: 74
End: 2019-12-06
Payer: COMMERCIAL

## 2019-12-06 ENCOUNTER — OFFICE VISIT (OUTPATIENT)
Dept: BEHAVIORAL HEALTH | Facility: CLINIC | Age: 74
End: 2019-12-06
Payer: COMMERCIAL

## 2019-12-06 ENCOUNTER — TELEPHONE (OUTPATIENT)
Dept: BEHAVIORAL HEALTH | Facility: CLINIC | Age: 74
End: 2019-12-06

## 2019-12-06 ENCOUNTER — DOCUMENTATION ONLY (OUTPATIENT)
Dept: BEHAVIORAL HEALTH | Facility: CLINIC | Age: 74
End: 2019-12-06

## 2019-12-06 VITALS
OXYGEN SATURATION: 98 % | HEART RATE: 86 BPM | DIASTOLIC BLOOD PRESSURE: 52 MMHG | SYSTOLIC BLOOD PRESSURE: 118 MMHG | TEMPERATURE: 98 F | BODY MASS INDEX: 23.96 KG/M2 | RESPIRATION RATE: 14 BRPM | WEIGHT: 144 LBS

## 2019-12-06 VITALS
DIASTOLIC BLOOD PRESSURE: 69 MMHG | SYSTOLIC BLOOD PRESSURE: 106 MMHG | BODY MASS INDEX: 23.63 KG/M2 | OXYGEN SATURATION: 96 % | WEIGHT: 142 LBS | HEART RATE: 89 BPM

## 2019-12-06 DIAGNOSIS — N18.5 CKD (CHRONIC KIDNEY DISEASE) STAGE 5, GFR LESS THAN 15 ML/MIN (H): ICD-10-CM

## 2019-12-06 DIAGNOSIS — E03.9 HYPOTHYROIDISM, UNSPECIFIED TYPE: ICD-10-CM

## 2019-12-06 DIAGNOSIS — Z99.2 DIALYSIS PATIENT (H): Primary | ICD-10-CM

## 2019-12-06 DIAGNOSIS — M54.50 CHRONIC LOW BACK PAIN WITHOUT SCIATICA, UNSPECIFIED BACK PAIN LATERALITY: ICD-10-CM

## 2019-12-06 DIAGNOSIS — Z09 FOLLOW-UP EXAMINATION AFTER VASCULAR SURGERY: ICD-10-CM

## 2019-12-06 DIAGNOSIS — F43.21 ADJUSTMENT DISORDER WITH DEPRESSED MOOD: Primary | ICD-10-CM

## 2019-12-06 DIAGNOSIS — Z79.899 MEDICATION MANAGEMENT: ICD-10-CM

## 2019-12-06 DIAGNOSIS — G89.29 CHRONIC LOW BACK PAIN WITHOUT SCIATICA, UNSPECIFIED BACK PAIN LATERALITY: ICD-10-CM

## 2019-12-06 DIAGNOSIS — Z48.89 ENCOUNTER FOR POST SURGICAL WOUND CHECK: ICD-10-CM

## 2019-12-06 DIAGNOSIS — N18.6 ESRD ON DIALYSIS (H): Primary | ICD-10-CM

## 2019-12-06 DIAGNOSIS — Z99.2 ESRD ON DIALYSIS (H): Primary | ICD-10-CM

## 2019-12-06 DIAGNOSIS — Z92.89 HISTORY OF RECENT HOSPITALIZATION: ICD-10-CM

## 2019-12-06 LAB
ALBUMIN SERPL-MCNC: 3 G/DL (ref 3.4–5)
ALP SERPL-CCNC: 175 U/L (ref 40–150)
ALT SERPL W P-5'-P-CCNC: 24 U/L (ref 0–50)
ANION GAP SERPL CALCULATED.3IONS-SCNC: 6 MMOL/L (ref 3–14)
AST SERPL W P-5'-P-CCNC: 19 U/L (ref 0–45)
BASOPHILS # BLD AUTO: 0.1 10E9/L (ref 0–0.2)
BASOPHILS NFR BLD AUTO: 0.6 %
BILIRUB SERPL-MCNC: 0.2 MG/DL (ref 0.2–1.3)
BUN SERPL-MCNC: 37 MG/DL (ref 7–30)
CALCIUM SERPL-MCNC: 8.5 MG/DL (ref 8.5–10.1)
CHLORIDE SERPL-SCNC: 107 MMOL/L (ref 94–109)
CO2 SERPL-SCNC: 29 MMOL/L (ref 20–32)
CREAT SERPL-MCNC: 4.75 MG/DL (ref 0.52–1.04)
DIFFERENTIAL METHOD BLD: ABNORMAL
EOSINOPHIL # BLD AUTO: 0.2 10E9/L (ref 0–0.7)
EOSINOPHIL NFR BLD AUTO: 2.6 %
ERYTHROCYTE [DISTWIDTH] IN BLOOD BY AUTOMATED COUNT: 19.4 % (ref 10–15)
GFR SERPL CREATININE-BSD FRML MDRD: 8 ML/MIN/{1.73_M2}
GLUCOSE SERPL-MCNC: 122 MG/DL (ref 70–99)
HCT VFR BLD AUTO: 30.1 % (ref 35–47)
HGB BLD-MCNC: 9.1 G/DL (ref 11.7–15.7)
LYMPHOCYTES # BLD AUTO: 1.2 10E9/L (ref 0.8–5.3)
LYMPHOCYTES NFR BLD AUTO: 13 %
MCH RBC QN AUTO: 31.4 PG (ref 26.5–33)
MCHC RBC AUTO-ENTMCNC: 30.2 G/DL (ref 31.5–36.5)
MCV RBC AUTO: 104 FL (ref 78–100)
MONOCYTES # BLD AUTO: 0.6 10E9/L (ref 0–1.3)
MONOCYTES NFR BLD AUTO: 6.3 %
NEUTROPHILS # BLD AUTO: 6.9 10E9/L (ref 1.6–8.3)
NEUTROPHILS NFR BLD AUTO: 77.5 %
PLATELET # BLD AUTO: 356 10E9/L (ref 150–450)
POTASSIUM SERPL-SCNC: 4.8 MMOL/L (ref 3.4–5.3)
PROT SERPL-MCNC: 7.1 G/DL (ref 6.8–8.8)
RBC # BLD AUTO: 2.9 10E12/L (ref 3.8–5.2)
SODIUM SERPL-SCNC: 142 MMOL/L (ref 133–144)
T4 FREE SERPL-MCNC: 0.53 NG/DL (ref 0.76–1.46)
TSH SERPL DL<=0.005 MIU/L-ACNC: 54.32 MU/L (ref 0.4–4)
WBC # BLD AUTO: 8.9 10E9/L (ref 4–11)

## 2019-12-06 PROCEDURE — 85025 COMPLETE CBC W/AUTO DIFF WBC: CPT | Performed by: NURSE PRACTITIONER

## 2019-12-06 PROCEDURE — 99215 OFFICE O/P EST HI 40 MIN: CPT | Performed by: NURSE PRACTITIONER

## 2019-12-06 PROCEDURE — 36415 COLL VENOUS BLD VENIPUNCTURE: CPT | Performed by: NURSE PRACTITIONER

## 2019-12-06 PROCEDURE — 84443 ASSAY THYROID STIM HORMONE: CPT | Performed by: NURSE PRACTITIONER

## 2019-12-06 PROCEDURE — 80053 COMPREHEN METABOLIC PANEL: CPT | Performed by: NURSE PRACTITIONER

## 2019-12-06 PROCEDURE — 84439 ASSAY OF FREE THYROXINE: CPT | Performed by: NURSE PRACTITIONER

## 2019-12-06 PROCEDURE — 90832 PSYTX W PT 30 MINUTES: CPT

## 2019-12-06 RX ORDER — OXYCODONE HYDROCHLORIDE 5 MG/1
5 TABLET ORAL EVERY 8 HOURS PRN
Qty: 90 TABLET | Refills: 0 | Status: ON HOLD | OUTPATIENT
Start: 2019-12-06 | End: 2019-12-14

## 2019-12-06 NOTE — LETTER
12/6/2019       RE: Kim Anne  [address]  [place] [address]     Dear Colleague,    Thank you for referring your patient, Kim Anne, to the [facility] at [facility]. Please see a copy of my visit note below.    Dialysis Access Service   Progress Note    S:  Ms. Anne is being seen today for surgical followup of her dialysis access.  She reports no issues with the wound, and  a little steal syndrome of the distal extremity. C/O mild numbness and tingling in right 5th finger and swelling in right arm and elbow. She reports swelling in arm is getting better. She went to ED and admitted PO 1 D/T hypotension. Denies pain in right arm, tingling/numbness and a change color/temperature in right hand and fingers. S/P Right  upper arm cephalic vein to brachial artery arterio-venous fistula creation on 11/22 2019.    O:/  Temp:  [98  F (36.7  C)] 98  F (36.7  C)  Pulse:  [86-89] 89  Resp:  [14] 14  BP: (106-118)/(52-69) 106/69  SpO2:  [96 %-98 %] 96 %  GENERAL: alert, cooperative  Circulation:    Radial pulse 3+  Ulnar pulse  3+   Capillary refill:  capillary refill < 2 sec    Sensory exam:   arm: Normal   [x]           Abnormal   []          Comment:    hand: Normal   []           Abnormal   [x]          Comment: mild numbness and tingling in right 5th finger  Motor exam:   arm: Normal   [x]           Abnormal   []          Comment:    hand: Normal   [x]           Abnormal   []          Comment:    Access: R upper extremity wound(s) healed, non-tender. Mild venous hypertension, ++ thrill and bruit via hand held doppler present. Mild edema in right elbow and forearm without apparent infection. Right hand slightly cooler/pale than the left. Right palm arch arterial pulse dopplerable.      Assessment & Plan: Ms. Anne's dialysis access has matured well at this time point.    1. Elevate right arm with support as tolerated  2. Check  thrill of AV fistula twice a day  3. May start left arm hammer curl exercise with a squeeze ball. 15 minutes a time, 3-4 times a day as tolerated  4. Wear warm gloves when is outside in cold weather temperature  5. Follow up with Dr. Allen in clinic in 4 weeks    We would like to see the patient back in the clinic in 4 weeks time to assess progress. The patient was counselled to contact our nurse coordinator, JUNIOR Dixon CNS (Sum) at [phone] with any questions or concerns.  Thank you for the opportunity to participate in Ms. Anne's care.    TT: 15 min  CT: 15 min    KAMILAH Reyes (Sum)  Dialysis Vascular Access/SOT Clinical Nurse Specialist    Solid Organ Transplant Service - [facility]   Phone # [phone]  Pager # [phone]    Again, thank you for allowing me to participate in the care of your patient.      Sincerely,    JUNIOR Aranda

## 2019-12-06 NOTE — PROGRESS NOTES
[facility] Care Coordination Contact    12/5/19 Rec'd phone call from Yulisa [employer] Adult Protection,  Conference in Pili WHIPPLE. Reviewed information from home visit, recent discharge from hospital, member not taking medications, declining TCU.    Informed that case will be reviewed.    Sakina Carrion RN, BC  Manager [facility] Care Coordinator   [phone]  (Fax)

## 2019-12-06 NOTE — PROGRESS NOTES
Disposed of patient's not needed medications Furosemide 20mg, Diltiazem 240mg, and Carvedilol 6.25mg per patient's request. Disposal occurred at [facility] medication disposal area. [facility] was accompanied by Anibal Anne, Medical Assistant

## 2019-12-06 NOTE — PROGRESS NOTES
[facility]: Integrated Behavioral Health  December 6, 2019      Behavioral Health Clinician Progress Note    Patient Name: Kim Anne           Service Type:  Individual      Service Location:   Face to Face in Clinic     Session Start Time: 8:40 am  Session End Time: 9:00 am      Session Length: 16 - 37      Attendees: Patient and PCP    Visit Activities (Refresh list every visit): [facility] [employer]    Diagnostic Assessment Date: 8/21/19  Treatment Plan Review Date: 11/21/19  See Flowsheets for today's PHQ-9 and JUSTINE-7 results  Previous PHQ-9:   PHQ-9 SCORE 3/12/2018 6/25/2018 9/5/2019   PHQ-9 Total Score - - -   PHQ-9 Total Score - - -   PHQ-9 Total Score 0 0 3     Previous JUSTINE-7:   JUSTINE-7 SCORE 12/12/2016 3/12/2018 6/25/2018   Total Score - - -   Total Score 0 0 0   Total Score - - -       NAE LEVEL:  NAE Score (Last Two) 2/18/2013 5/23/2014   NAE Raw Score 48 45   Activation Score 80 73.1   NAE Level 4 4       DATA  Extended Session (60+ minutes): No  Interactive Complexity: No  Crisis: No  [facility] Patient: No    Treatment Objective(s) Addressed in This Session:  Target Behavior(s): disease management/lifestyle changes and mental health symptoms    Depressed Mood: Increase interest, engagement, and pleasure in doing things  Decrease frequency and intensity of feeling down, depressed, hopeless  Improve quantity and quality of night time sleep / decrease daytime naps  Improve diet, appetite, mindful eating, and / or meal planning  Psychological distress related to Pain    Current Stressors / Issues:  [facility] co-visit with patient and PCP in the clinic. Patient presents for routine follow up. Primary concern is medications. Patient is unsure what medications she needs to be taking. She also lost her pill box which has made it difficult to keep track of her medications. Her granddaughter helps set up her medications. PCP reviewed medications, patient expressed understanding regarding what she  should be taking and how. Provided patient with a new pill box. Patient and PCP reviewed discharge recommendations. Patient has declined TCU care and Home Care services. Patient appeared guarded about the recommendations, noting she doesn t want to spend anymore time away from home and feels she is able to take care of herself adequately. Instead she plans to visit her sisters and go on walks with them to regain her strength. Patient reports her mood is  very good.  She is glad to be home spending time with her grandchildren and sisters,  they re helpful.  Patient reports poor sleep due to foot pain. Patient reports difficult managing chronic pain, noting knee and leg pain making it difficult to walk. She holds on furniture to help her walk throughout her home. Patient reports low appetite,  I don t have an appetite but I m eating.       Progress on Treatment Objective(s) / Homework:  No improvement - CONTEMPLATION (Considering change and yet undecided); Intervened by assessing the negative and positive thinking (ambivalence) about behavior change    Motivational Interviewing    MI Intervention: Expressed Empathy/Understanding, Supported Autonomy, Collaboration, Evocation, Open-ended questions and Reflections: simple and complex     Change Talk Expressed by the Patient: Desire to change Reasons to change Need to change    Provider Response to Change Talk: E - Evoked more info from patient about behavior change, A - Affirmed patient's thoughts, decisions, or attempts at behavior change, R - Reflected patient's change talk and S - Summarized patient's change talk statements    Also provided psychoeducation about behavioral health condition, symptoms, and treatment options    Care Plan review completed: No    Medication Review:  No changes to current psychiatric medication(s)    Medication Compliance:  Variable    Changes in Health Issues:   Yes: Please see PCP note    Chemical Use Review:   Substance Use: Chemical use  reviewed, no active concerns identified      Tobacco Use: No change in amount of tobacco use since last session.  Patient declined discussion at this time    Assessment: Current Emotional / Mental Status (status of significant symptoms):  Risk status (Self / Other harm or suicidal ideation)  Patient denies a history of suicidal ideation, suicide attempts, self-injurious behavior, homicidal ideation, homicidal behavior and and other safety concerns  Patient denies current fears or concerns for personal safety.  Patient denies current or recent suicidal ideation or behaviors.  Patient denies current or recent homicidal ideation or behaviors.  Patient denies current or recent self injurious behavior or ideation.  Patient denies other safety concerns.  A safety and risk management plan has not been developed at this time, however patient was encouraged to call [place] [phone] should there be a change in any of these risk factors.    Appearance:   Appropriate   Eye Contact:   Fair   Psychomotor Behavior: Normal   Attitude:   Guarded   Orientation:   All  Speech   Rate / Production: Normal    Volume:  Normal   Mood:    Depressed   Affect:    Blunted   Thought Content:  Clear   Thought Form:  Coherent  Logical   Insight:    Poor     Diagnoses:  1. Adjustment disorder with depressed mood        Collateral Reports Completed:  Not Applicable    Plan: (Homework, other):  Patient was given information about behavioral services and encouraged to schedule a follow up appointment with the clinic [facility] as needed.  She was also given information about mental health symptoms and treatment options .  CD Recommendations: No indications of CD issues.   GRETCHEN Flores, [facility]  Reviewed/Supervised by: LAMINE Bess, [facility]      ______________________________________________________________________    Integrated Primary Care Behavioral Health Treatment Plan    Patient's Name: Kim Anne  YOB: 1945    Date:  8/21/19    DSM-V Diagnoses: Adjustment Disorders  309.0 (F43.21) With depressed mood  Psychosocial / Contextual Factors: on disability, chronic diseases, extensive social network  WHODAS: 22    Referral / Collaboration:  Referral to another professional/service is not indicated at this time.     Anticipated number of session or this episode of care: 10      Measurable Treatment Goal(s) related to diagnosis / functional impairment(s)  Goal 1: Patient will develop coping/problem-solving skills to facilitate more adaptive adjustment.    I will know I've met my goal when my appetite and sleep improves.      Objective #A (Patient Action)    Patient will identify 2 strategies to more effectively address stressors.  Status: New - Date: 8/21/19     Intervention(s)  [employer] will teach emotional regulation skills. and distress tolerance skills.      Goal 2: Patient will develop effective chronic pain management skills.    I will know I've met my goal when I take my medications regularly.      Objective #A  Patient will identify 2 strategies for managing pain.    Status: New - Date: 8/21/19     Intervention(s)  [employer] will teach distraction skills. mindfulness strategies.    Patient has reviewed and agreed to the above plan.      GRETCHEN Flores  August 21, 2019

## 2019-12-06 NOTE — TELEPHONE ENCOUNTER
"A paper carlos stocking was decorated for the clinic the stocking reads \"Betty Mast homeless hurting 4 daughters pl help [phone] Angelina.\" [facility] called to follow up with patient and see if she needs any support. Patient did not answer, left a message.   "

## 2019-12-06 NOTE — PROGRESS NOTES
SUBJECTIVE:                                                    Kim Anne is a 74 year old female who presents to clinic today for the following health issues:  [facility]: [place]    Patient states that she gets her Dialysis T/Th/Sat; [place] that [place].     Patient declines TCU care and Home Care services. States she has plenty of help in the home; patient very defensive about any talks about being in any type of institution for her recovery care.        Hospital Follow-up Visit:  Hospital/Nursing Home/IP Rehab Facility: [facility]  Date of Admission: 1123/19  Date of Discharge: 11/27/19  Reason(s) for Admission: ESRD, Hypotension, Acute respiratory failure, Pneumonia, Pulmonary emphysema            Problems taking medications regularly:  None       Medication changes since discharge:     STOP taking these medications         carvedilol (COREG) 6.25 MG tablet Comments:   Reason for Stopping:            furosemide (LASIX) 20 MG tablet Comments:   Reason for Stopping:            traMADol (ULTRAM) 50 MG tablet Comments:   Reason for Stopping:                 Problems adhering to non-medication therapy:  Yes, patient has not taken any of her medications since she was discharged because she was unclear what she was supposed to be taking. Patient brought all her medications with her for clarification; no inhalers brought today.     Summary of hospitalization:  [facility] discharge summary reviewed  Diagnostic Tests/Treatments reviewed.  Follow up needed: none  Other Healthcare Providers Involved in Patient s Care:         Care Coordination and MTM  Update since discharge: stable.     Post Discharge Medication Reconciliation: discharge medications reconciled, continue medications without change.  Plan of care communicated with patient     Coding guidelines for this visit:  Type of Medical   Decision Making  Face-to-Face Visit       within 7 Days of discharge Face-to-Face Visit        within 14 days of discharge   Moderate Complexity [id number]   High Complexity [id number]              Mental Health Follow up: Depression.  Patient feels like she is able to take care of herself with the help of her family that is around her. Reports that she has increased generalized pain. Patient also needs clarification with her medications. States that she lost her po    Status since last visit: worsening pain.     See PHQ-9 for current symptoms.  Other associated symptoms: None    Complicating factors: unsure what medications to take.  Significant life event:  No   Current substance abuse:  None  Anxiety or Panic symptoms:  No    Sleep - foot sensitivity with the lack of pain medication.  Appetite - reports that she has a poor appetite.   Exercise - states that when she is with her sisters, she walks around plenty and does not need to have PT.     PHQ-9  English PHQ-9   Any Language               Problems taking medications regularly: Yes,  Not clear what she needs to be taking.     Medication side effects: none    Diet: Renal Diet.     Social History     Tobacco Use     Smoking status: Light Tobacco Smoker     Packs/day: 0.25     Years: 50.00     Pack years: 12.50     Types: Cigarettes     Smokeless tobacco: Never Used   Substance Use Topics     Alcohol use: No     Alcohol/week: 0.0 standard drinks        Problem list and histories reviewed & adjusted, as indicated.  Additional history: as documented    Patient Active Problem List   Diagnosis     Hyperlipidemia LDL goal <100     ARAUZ (dyspnea on exertion)     COPD (chronic obstructive pulmonary disease) (H)     Edema     Fatigue     History of MI (myocardial infarction)     Snores     Knee pain, left     Bunion of great toe of left foot     Dystrophic nail     Arthritis     Peripheral vascular disease (H)     Chronic low back pain     Health Care Home     CKD (chronic kidney  disease) stage 4, GFR 15-29 ml/min (H)     Abnormal gait     Health maintenance examination     Vitamin D deficiency     Constipation     Bradycardia     Callus of foot     Other chronic pain     Cataracts, bilateral     Hyperopic astigmatism of both eyes     Presbyopia     Asymptomatic bunion, right     Acquired hypothyroidism     Type 2 diabetes mellitus with diabetic chronic kidney disease (H)     Obesity (BMI 30-39.9)     History of sick sinus syndrome     Nephrotic syndrome     Pneumonia of both lower lobes due to infectious organism (H)     Grief     Tobacco dependence     Hyperkalemia     Type 2 diabetes, HbA1C goal < 8% (H)     Smoker     Single kidney     Hypothyroid     Hypertension goal BP (blood pressure) < 140/90     Diabetic nephropathy (H)     Hypoalbuminemia     Skin lesion of hand     ERRONEOUS ENCOUNTER--DISREGARD     Atypical chest pain     COPD exacerbation (H)     Dialysis patient (H)     Atrial tachycardia (H)     Adjustment disorder with depressed mood     ESRD on dialysis (H)     Encounter regarding vascular access for dialysis for ESRD (H)     Hypotension, unspecified hypotension type     Past Surgical History:   Procedure Laterality Date     APPENDECTOMY       BLEPHAROPLASTY BILATERAL  9/18/2013    Procedure: BLEPHAROPLASTY BILATERAL;  BILATERAL UPPER EYELID BLEPHAROPLASTY ;  Surgeon: Olayinka Lyon MD;  Location:  [facility]     CATARACT IOL, RT/LT Bilateral 2016     CHOLECYSTECTOMY       COLONOSCOPY  7/15/2011    polyps repeat in 5 years     CREATE FISTULA ARTERIOVENOUS UPPER EXTREMITY Right 11/22/2019    Procedure: CREATION, GRAFT, ARTERIOVENOUS, RIGHT UPPER EXTREMITY;  Surgeon: Susana Allen MD;  Location: [facility] OR     CV CORONARY ANGIOGRAM N/A 5/23/2019    Procedure: Coronary Angiogram;  Surgeon: Kunal Nj MD;  Location: [facility] HEART CARDIAC CATH LAB     elected term pregnancy       HYSTEROSCOPIC PLACEMENT CONTRACEPTIVE DEVICE       IR CVC TUNNEL PLACEMENT > 5 YRS OF AGE  5/2/2019      KIDNEY SURGERY      donated left kideny     OVARY SURGERY      left for cyst benign     subclavian stent  2010    [facility]       Social History     Tobacco Use     Smoking status: Light Tobacco Smoker     Packs/day: 0.25     Years: 50.00     Pack years: 12.50     Types: Cigarettes     Smokeless tobacco: Never Used   Substance Use Topics     Alcohol use: No     Alcohol/week: 0.0 standard drinks     Family History   Problem Relation Age of Onset     Diabetes Mother         brother, MGM, sister     Kidney Disease Brother         X2 DM two      Alcohol/Drug Child         daughter     Alcoholism Son      Diabetes Son      Asthma No family hx of      C.A.D. No family hx of      Hypertension No family hx of      Cerebrovascular Disease No family hx of      Breast Cancer No family hx of      Cancer - colorectal No family hx of      Prostate Cancer No family hx of      Allergies No family hx of      Alzheimer Disease No family hx of      Anesthesia Reaction No family hx of      Arthritis No family hx of      Blood Disease No family hx of      Cancer No family hx of      Cardiovascular No family hx of      Circulatory No family hx of      Congenital Anomalies No family hx of      Connective Tissue Disorder No family hx of      Depression No family hx of      Eye Disorder No family hx of      Genetic Disorder No family hx of      Gastrointestinal Disease No family hx of      Genitourinary Problems No family hx of      Gynecology No family hx of      Heart Disease No family hx of      Lipids No family hx of      Musculoskeletal Disorder No family hx of      Neurologic Disorder No family hx of      Obesity No family hx of      Osteoporosis No family hx of      Psychotic Disorder No family hx of      Respiratory No family hx of      Thyroid Disease No family hx of      Glaucoma No family hx of      Macular Degeneration No family hx of            Current Outpatient Medications   Medication Sig Dispense Refill      albuterol (PROAIR HFA/PROVENTIL HFA/VENTOLIN HFA) 108 (90 Base) MCG/ACT inhaler Inhale 2 puffs into the lungs every 6 hours as needed for shortness of breath / dyspnea or wheezing (Patient taking differently: Inhale 2 puffs into the lungs every 6 hours as needed for shortness of breath / dyspnea or wheezing (Pt has not used in past 2 weeks 11/20/19) ) 18 g 3     albuterol (PROVENTIL) (2.5 MG/3ML) 0.083% neb solution Take 1 vial (2.5 mg) by nebulization every 6 hours as needed for shortness of breath / dyspnea or wheezing (Patient taking differently: Take 2.5 mg by nebulization every 6 hours as needed for shortness of breath / dyspnea or wheezing (Pt has not used in past 2 weeks 11/20/19) ) 180 mL 3     ammonium lactate (LAC-HYDRIN) 12 % external lotion Apply topically 2 times daily 225 g 0     amoxicillin-clavulanate (AUGMENTIN) 500-125 MG tablet Take 1 tablet by mouth every 24 hours 5 tablet 0     ASPIR-LOW 81 MG EC tablet Take 1 tablet (81 mg) by mouth daily (Patient taking differently: Take 81 mg by mouth At Bedtime ) 90 tablet 3     atorvastatin (LIPITOR) 40 MG tablet Take 1 tablet (40 mg) by mouth daily (Patient taking differently: Take 40 mg by mouth At Bedtime ) 90 tablet 1     blood glucose monitoring (FREESTYLE LITE) test strip TEST BLOOD SUGAR TWO TIMES A DAY 50 strip 12     blood glucose monitoring (FREESTYLE) lancets Test BS two times daily as directed 100 each 1     docusate sodium (COLACE) 100 MG capsule Take 200 mg by mouth daily Pt last took 11/19/19 60 capsule 4     Emollient (EUCERIN CALMING DAILY MOIST) CREA Externally apply 1 dose. topically daily 1 Tube 12     fluticasone-salmeterol (ADVAIR) 250-50 MCG/DOSE inhaler Inhale 1 puff into the lungs every 12 hours (Patient taking differently: Inhale 1 puff into the lungs every 12 hours Pt has not used in past month 11/20/19) 1 Inhaler 5     levothyroxine (SYNTHROID/LEVOTHROID) 200 MCG tablet Take 1 tablet (200 mcg) by mouth daily (Patient taking  differently: Take 200 mcg by mouth daily Pt has not taken in past month 11/20/19) 90 tablet 1     multivitamin RENAL (NEPHROCAPS/TRIPHROCAPS) 1 MG capsule Take 1 capsule by mouth daily Pt doesn't recall last dose 11/20/19       nitroGLYcerin (NITROSTAT) 0.4 MG sublingual tablet Place 1 tablet (0.4 mg) under the tongue every 5 minutes as needed for chest pain (Patient not taking: Reported on 9/4/2019) 25 tablet 0     nystatin (MYCOSTATIN) 203312 UNIT/GM POWD Apply 1 g topically 3 times daily as needed 30 g 1     order for DME Equipment being ordered: Nebulizer 1 Device 0     order for DME Equipment being ordered: Boost. 6 Can 3     order for DME Equipment being ordered: cane 1 each 0     order for DME Equipment being ordered: Diabetic Shoes 1 each 0     order for DME Equipment being ordered: two pairs moderate knee high support hose 2 Device 1     order for DME Equipment being ordered: Compression socks.  Strength:15-20 mmHg 2 each 0     order for DME One wheeled walker with seat and brakes and basket 1 Device 0     oxyCODONE IR (ROXICODONE) 10 MG tablet Take 0.5-1 tablets (5-10 mg) by mouth every 8 hours as needed for moderate to severe pain (Patient taking differently: Take 5-10 mg by mouth every 8 hours as needed for moderate to severe pain (Pt last took 11/19/19) ) 90 tablet 0     polyethylene glycol (MIRALAX/GLYCOLAX) packet Take 17 g by mouth daily as needed for constipation 10 packet 0     tiotropium (SPIRIVA) 18 MCG inhaled capsule Inhale 1 capsule (18 mcg) into the lungs daily (Patient taking differently: Inhale 18 mcg into the lungs daily Pt has not used in past month 11/20/19) 30 capsule 11     vitamin D3 (CHOLECALCIFEROL) 2000 units (50 mcg) tablet Take 1 tablet (2,000 Units) by mouth daily (Patient taking differently: Take 2,000 Units by mouth At Bedtime ) 90 tablet 3     Allergies   Allergen Reactions     Contrast Dye Hives and Itching     Clonidine      She had as IP and thinks it made her itchy      Diatrizoate Other (See Comments)     Diltiazem      Severe bradycardia     Iodine-131      Recent Labs   Lab Test 11/25/19  0813 11/24/19  0335 11/23/19  1003  06/20/19  1400 06/19/19  2209  05/23/19  0445  05/01/19  1748  01/04/19  1426  10/19/18  0939  07/26/18  1116  10/25/17  0639  09/07/17  1135   A1C  --   --   --   --   --   --   --   --   --  5.2  --  5.5  --   --   --  5.8*   < > 6.6*  --  6.4*   LDL  --   --   --   --   --   --   --   --   --   --   --   --   --  46  --   --   --  104*  --  81   HDL  --   --   --   --   --   --   --   --   --   --   --   --   --  77  --   --   --  67  --  75   TRIG  --   --   --   --   --   --   --   --   --   --   --   --   --  188*  --   --   --  212*  --  283*   ALT  --   --  25  --   --  17  --  10   < >  --   --   --   --   --   --   --   --  16  --  18   CR 6.54* 4.98* 3.73*   < >  --  3.27*   < > 2.97*   < >  --    < >  --    < >  --    < >  --    < > 2.77*   < > 2.70*   GFRESTIMATED 6* 8* 11*   < >  --  13*   < > 15*   < >  --    < >  --    < >  --    < >  --    < > 17*   < > 17*   GFRESTBLACK 7* 9* 13*   < >  --  15*   < > 17*   < >  --    < >  --    < >  --    < >  --    < > 20*   < > 21*   POTASSIUM 4.7 4.6 3.9   < > 4.1 3.6   < > 3.8   < > 6.4*   < >  --    < >  --    < >  --    < > 4.3   < > 6.2*   TSH  --   --  83.55*  --  0.35*  --   --   --    < >  --   --   --   --   --   --  1.12  --   --    < > 71.77*    < > = values in this interval not displayed.      BP Readings from Last 3 Encounters:   11/27/19 119/44   11/22/19 93/70   11/20/19 118/58    Wt Readings from Last 3 Encounters:   11/26/19 64.1 kg (141 lb 6.4 oz)   11/22/19 58.9 kg (129 lb 13.6 oz)   11/20/19 59.3 kg (130 lb 11.2 oz)          Labs reviewed in EPIC  Problem list, Medication list, Allergies, and Medical/Social/Surgical histories reviewed in [facility] and updated as appropriate.      ROS: Constitutional, neuro, ENT, endocrine, pulmonary, cardiac, gastrointestinal, genitourinary, musculoskeletal,  integument and psychiatric systems are negative, except as otherwise noted above in the HPI.     OBJECTIVE:                                                    /52   Pulse 86   Temp 98  F (36.7  C)   Resp 14   Wt 65.3 kg (144 lb)   SpO2 98%   BMI 23.96 kg/m    Body mass index is 23.96 kg/m .    GENERAL: healthy, alert and no distress  EYES: Eyes grossly normal to inspection, PERRL and conjunctivae and sclerae normal  NECK: no adenopathy, no asymmetry, masses, or scars and thyroid normal to palpation  RESP: lungs clear to auscultation - no rales, rhonchi or wheezes  CV: regular rates and rhythm, normal S1 S2, no S3 or S4, no murmur, click or rub and no peripheral edema  ABDOMEN: soft, nontender and bowel sounds normal  MS: positive for generalized weakness. no gross musculoskeletal defects noted, no edema  NEURO: Normal strength and tone, mentation intact and speech normal    Mental Status Assessment:  Appearance:   Appropriate   Eye Contact:   Good   Psychomotor Behavior: Normal   Attitude:   Cooperative  Guarded   Orientation:   All  Speech   Rate / Production: Normal    Volume:  Normal   Mood:    Normal  Affect:    Appropriate   Thought Content:  Clear   Thought Form:  Coherent  Logical   Insight:    Fair   Attention Span and Concentration: appropriate.   Recent and Remote Memory:  intact  Fund of Knowledge: appropriate  Muscle Strength and Tone: normal   Suicidal Ideation: reports no thoughts, no intention    See [facility] notes     Diagnostic Test Results:  Results for orders placed or performed in visit on 12/06/19   CBC with platelets and differential     Status: Abnormal   Result Value Ref Range    WBC 8.9 4.0 - 11.0 10e9/L    RBC Count 2.90 (L) 3.8 - 5.2 10e12/L    Hemoglobin 9.1 (L) 11.7 - 15.7 g/dL    Hematocrit 30.1 (L) 35.0 - 47.0 %     (H) 78 - 100 fl    MCH 31.4 26.5 - 33.0 pg    MCHC 30.2 (L) 31.5 - 36.5 g/dL    RDW 19.4 (H) 10.0 - 15.0 %    Platelet Count 356 150 - 450 10e9/L    %  Neutrophils 77.5 %    % Lymphocytes 13.0 %    % Monocytes 6.3 %    % Eosinophils 2.6 %    % Basophils 0.6 %    Absolute Neutrophil 6.9 1.6 - 8.3 10e9/L    Absolute Lymphocytes 1.2 0.8 - 5.3 10e9/L    Absolute Monocytes 0.6 0.0 - 1.3 10e9/L    Absolute Eosinophils 0.2 0.0 - 0.7 10e9/L    Absolute Basophils 0.1 0.0 - 0.2 10e9/L    Diff Method Automated Method    Comprehensive metabolic panel (BMP + Alb, Alk Phos, ALT, AST, Total. Bili, TP)     Status: Abnormal   Result Value Ref Range    Sodium 142 133 - 144 mmol/L    Potassium 4.8 3.4 - 5.3 mmol/L    Chloride 107 94 - 109 mmol/L    Carbon Dioxide 29 20 - 32 mmol/L    Anion Gap 6 3 - 14 mmol/L    Glucose 122 (H) 70 - 99 mg/dL    Urea Nitrogen 37 (H) 7 - 30 mg/dL    Creatinine 4.75 (H) 0.52 - 1.04 mg/dL    GFR Estimate 8 (L) >60 mL/min/[1.73_m2]    GFR Estimate If Black 10 (L) >60 mL/min/[1.73_m2]    Calcium 8.5 8.5 - 10.1 mg/dL    Bilirubin Total 0.2 0.2 - 1.3 mg/dL    Albumin 3.0 (L) 3.4 - 5.0 g/dL    Protein Total 7.1 6.8 - 8.8 g/dL    Alkaline Phosphatase 175 (H) 40 - 150 U/L    ALT 24 0 - 50 U/L    AST 19 0 - 45 U/L   TSH with free T4 reflex     Status: Abnormal   Result Value Ref Range    TSH 54.32 (H) 0.40 - 4.00 mU/L   T4 free     Status: Abnormal   Result Value Ref Range    T4 Free 0.53 (L) 0.76 - 1.46 ng/dL        ASSESSMENT/PLAN:                                                    (Z92.89)History of recent hospitalization.   (Z99.2) Dialysis patient (H)  (primary encounter diagnosis)  (N18.5) CKD (chronic kidney disease) stage 5, GFR less than 15 ml/min (H)  Comment: Gets dialysis three times per week. Followed by nephrology for management.   Plan: CBC with platelets and differential,         Comprehensive metabolic panel (BMP + Alb, Alk         Phos, ALT, AST, Total. Bili, TP), T4 free, T4         free        -Continue with treatment as recommended.     (M54.5,  G89.29) Chronic low back pain without sciatica, unspecified back pain laterality  Comment:  Patient with generalized pain. Has been off her pain medications since 11/19. Patient was on oxycodone for pain management.   Plan: oxyCODONE IR (ROXICODONE) 5 MG tablet        Restart oxycodone at a lower dose and have patient follow up in a month or sooner if needed for reassessment.     (Z79.899) Medication management  Comment: Patient reports that she has not taken any medications since her discharge. She does present with all her pills that she is supposed to be taking. Patient did not bring her inhalers today.    Plan: Medications reviewed. Discontinued medications as noted on discharge. BP is stable. Will continue to keep patient off her BP medications for now.   -Patient notes that her grand daughter sets up her medications for her.   -Patient declined TCU or home care to her home- feels very strongly about this!    (E03.9) Hypothyroidism, unspecified type  Comment: Patient does have her levothyroxine with her today. Will plan to recheck her at the next appointment after she has been taking her medication regularly.   Plan: TSH with free T4 reflex        -Medications confirmed with patient and will start taking as scheduled.       Patient Instructions   -Oxycodone refilled, continue with the 1 tablet three times per day.   -Labs today, will call with the results.   -Call clinic with any questions or concerns.     I spent Greater than 40 minutes was spent face to face with Kim Anne, with greater than 50 % in counselling and consultation about Post hospitalization, medication management, and chronic pain.       JUNIOR Lopez CNP  [facility] PRIMARY CARE

## 2019-12-06 NOTE — PROGRESS NOTES
Dialysis Access Service   Progress Note    S:  Ms. Anne is being seen today for surgical followup of her dialysis access.  She reports no issues with the wound, and  a little steal syndrome of the distal extremity. C/O mild numbness and tingling in right 5th finger and swelling in right arm and elbow. She reports swelling in arm is getting better. She went to ED and admitted PO 1 D/T hypotension. Denies pain in right arm, tingling/numbness and a change color/temperature in right hand and fingers. S/P Right upper arm cephalic vein to brachial artery arterio-venous fistula creation on 11/22 2019.    O:/  Temp:  [98  F (36.7  C)] 98  F (36.7  C)  Pulse:  [86-89] 89  Resp:  [14] 14  BP: (106-118)/(52-69) 106/69  SpO2:  [96 %-98 %] 96 %  GENERAL: alert, cooperative  Circulation:    Radial pulse 3+  Ulnar pulse  3+   Capillary refill:  capillary refill < 2 sec    Sensory exam:   arm: Normal   [x]           Abnormal   []          Comment:    hand: Normal   []           Abnormal   [x]          Comment: mild numbness and tingling in right 5th finger  Motor exam:   arm: Normal   [x]           Abnormal   []          Comment:    hand: Normal   [x]           Abnormal   []          Comment:    Access: R upper extremity wound(s) healed, non-tender. Mild venous hypertension, ++ thrill and bruit via hand held doppler present. Mild edema in right elbow and forearm without apparent infection. Right hand slightly cooler/pale than the left. Right palm arch arterial pulse dopplerable.      Assessment & Plan: Ms. Anne's dialysis access has matured well at this time point.    1. Elevate right arm with support as tolerated  2. Check thrill of AV fistula twice a day  3. May start left arm hammer curl exercise with a squeeze ball. 15 minutes a time, 3-4 times a day as tolerated  4. Wear warm gloves when is outside in cold weather temperature  5. Follow up with Dr. Allen in clinic in 4 weeks    We would like to see the patient  back in the clinic in 4 weeks time to assess progress. The patient was counselled to contact our nurse coordinator, JUNIOR Dixon CNS (Sum) at [phone] with any questions or concerns.  Thank you for the opportunity to participate in Ms. Anne's care.    TT: 15 min  CT: 15 min    KAMILAH Reyes (Sum)  Dialysis Vascular Access/SOT Clinical Nurse Specialist    Solid Organ Transplant Service - [facility]   Phone # [phone]  Pager # [phone]

## 2019-12-06 NOTE — PATIENT INSTRUCTIONS
-Oxycodone refilled, continue with the 1 tablet three times per day.   -Labs today, will call with the results.   -Call clinic with any questions or concerns.

## 2019-12-09 ENCOUNTER — PATIENT OUTREACH (OUTPATIENT)
Dept: GERIATRIC MEDICINE | Facility: CLINIC | Age: 74
End: 2019-12-09

## 2019-12-09 ENCOUNTER — TELEPHONE (OUTPATIENT)
Dept: FAMILY MEDICINE | Facility: CLINIC | Age: 74
End: 2019-12-09

## 2019-12-09 NOTE — PROGRESS NOTES
[facility] Care Coordination Contact    Epic notes reviewed, member completed hospital follow up with PCP 12/6/19. Per visit notes, member declines TCU care and Home Care services. States she has plenty iof help in the home    CC provided business card to member and daughter on 12/4/19. CC will not follow up with member at this time.  Sakina Carrion RN, BC  Manager [facility] Care Coordinator   [phone]  (Fax)

## 2019-12-09 NOTE — TELEPHONE ENCOUNTER
Talked to Sakina- NICOLE about patient and updated her on the current situation.   All other inquiries can be found on the OV note from 12/6/2019.   JUNIOR Ragland CNP

## 2019-12-09 NOTE — PROGRESS NOTES
[facility] Care Coordination Contact    Rec'd notification that [place] [employer] shows member is not eligible for [place].  Call placed to Yulisa Nair, spoke with Sergey who states that member had called and stated that she did not receive the renewal forms in the mail and that Yulisa Nair resent the forms on 11/21/19. Sergey states that the forms have not been rec'd.  Attempted to reach member, no answer, no voice mail.  Sakina Carrion RN, BC  Manager [facility] Care Coordinator   [phone]  (Fax)

## 2019-12-10 ENCOUNTER — HOSPITAL ENCOUNTER (OUTPATIENT)
Facility: CLINIC | Age: 74
Setting detail: OBSERVATION
Discharge: HOME | End: 2019-12-14
Attending: EMERGENCY MEDICINE | Admitting: EMERGENCY MEDICINE
Payer: COMMERCIAL

## 2019-12-10 ENCOUNTER — TELEPHONE (OUTPATIENT)
Dept: FAMILY MEDICINE | Facility: CLINIC | Age: 74
End: 2019-12-10

## 2019-12-10 ENCOUNTER — APPOINTMENT (OUTPATIENT)
Dept: GENERAL RADIOLOGY | Facility: CLINIC | Age: 74
End: 2019-12-10
Attending: EMERGENCY MEDICINE
Payer: COMMERCIAL

## 2019-12-10 DIAGNOSIS — G89.29 CHRONIC LOW BACK PAIN WITHOUT SCIATICA, UNSPECIFIED BACK PAIN LATERALITY: ICD-10-CM

## 2019-12-10 DIAGNOSIS — F17.210 CIGARETTE SMOKER: ICD-10-CM

## 2019-12-10 DIAGNOSIS — M54.50 CHRONIC LOW BACK PAIN WITHOUT SCIATICA, UNSPECIFIED BACK PAIN LATERALITY: ICD-10-CM

## 2019-12-10 DIAGNOSIS — T82.7XXA INFECTION OF ARTERIOVENOUS FISTULA, INITIAL ENCOUNTER (H): ICD-10-CM

## 2019-12-10 DIAGNOSIS — J41.0 SIMPLE CHRONIC BRONCHITIS (H): Primary | ICD-10-CM

## 2019-12-10 DIAGNOSIS — N18.6 ESRD ON DIALYSIS (H): ICD-10-CM

## 2019-12-10 DIAGNOSIS — Z99.2 ESRD ON DIALYSIS (H): ICD-10-CM

## 2019-12-10 DIAGNOSIS — R07.9 CHEST PAIN, UNSPECIFIED TYPE: ICD-10-CM

## 2019-12-10 DIAGNOSIS — R07.89 OTHER CHEST PAIN: ICD-10-CM

## 2019-12-10 LAB
ANION GAP SERPL CALCULATED.3IONS-SCNC: 3 MMOL/L (ref 3–14)
BASOPHILS # BLD AUTO: 0.1 10E9/L (ref 0–0.2)
BASOPHILS NFR BLD AUTO: 0.9 %
BUN SERPL-MCNC: 14 MG/DL (ref 7–30)
CALCIUM SERPL-MCNC: 8.1 MG/DL (ref 8.5–10.1)
CHLORIDE SERPL-SCNC: 101 MMOL/L (ref 94–109)
CO2 SERPL-SCNC: 33 MMOL/L (ref 20–32)
CREAT SERPL-MCNC: 2.66 MG/DL (ref 0.52–1.04)
DIFFERENTIAL METHOD BLD: ABNORMAL
EOSINOPHIL # BLD AUTO: 0.1 10E9/L (ref 0–0.7)
EOSINOPHIL NFR BLD AUTO: 1.8 %
ERYTHROCYTE [DISTWIDTH] IN BLOOD BY AUTOMATED COUNT: 17.6 % (ref 10–15)
GFR SERPL CREATININE-BSD FRML MDRD: 17 ML/MIN/{1.73_M2}
GLUCOSE BLDC GLUCOMTR-MCNC: 72 MG/DL (ref 70–99)
GLUCOSE SERPL-MCNC: 131 MG/DL (ref 70–99)
HBA1C MFR BLD: 5.8 % (ref 0–5.6)
HCT VFR BLD AUTO: 32.6 % (ref 35–47)
HGB BLD-MCNC: 10.2 G/DL (ref 11.7–15.7)
IMM GRANULOCYTES # BLD: 0 10E9/L (ref 0–0.4)
IMM GRANULOCYTES NFR BLD: 0.4 %
INTERPRETATION ECG - MUSE: NORMAL
LYMPHOCYTES # BLD AUTO: 1.1 10E9/L (ref 0.8–5.3)
LYMPHOCYTES NFR BLD AUTO: 14.9 %
MCH RBC QN AUTO: 31.6 PG (ref 26.5–33)
MCHC RBC AUTO-ENTMCNC: 31.3 G/DL (ref 31.5–36.5)
MCV RBC AUTO: 101 FL (ref 78–100)
MONOCYTES # BLD AUTO: 0.3 10E9/L (ref 0–1.3)
MONOCYTES NFR BLD AUTO: 3.7 %
NEUTROPHILS # BLD AUTO: 6 10E9/L (ref 1.6–8.3)
NEUTROPHILS NFR BLD AUTO: 78.3 %
NRBC # BLD AUTO: 0 10*3/UL
NRBC BLD AUTO-RTO: 0 /100
NT-PROBNP SERPL-MCNC: ABNORMAL PG/ML (ref 0–900)
PLATELET # BLD AUTO: 352 10E9/L (ref 150–450)
POTASSIUM SERPL-SCNC: 3.7 MMOL/L (ref 3.4–5.3)
RBC # BLD AUTO: 3.23 10E12/L (ref 3.8–5.2)
SODIUM SERPL-SCNC: 137 MMOL/L (ref 133–144)
TROPONIN I SERPL-MCNC: 0.03 UG/L (ref 0–0.04)
TROPONIN I SERPL-MCNC: 0.04 UG/L (ref 0–0.04)
TROPONIN I SERPL-MCNC: 0.05 UG/L (ref 0–0.04)
WBC # BLD AUTO: 7.7 10E9/L (ref 4–11)

## 2019-12-10 PROCEDURE — 80048 BASIC METABOLIC PNL TOTAL CA: CPT | Performed by: EMERGENCY MEDICINE

## 2019-12-10 PROCEDURE — 36415 COLL VENOUS BLD VENIPUNCTURE: CPT | Performed by: NURSE PRACTITIONER

## 2019-12-10 PROCEDURE — 96374 THER/PROPH/DIAG INJ IV PUSH: CPT

## 2019-12-10 PROCEDURE — 84484 ASSAY OF TROPONIN QUANT: CPT | Performed by: NURSE PRACTITIONER

## 2019-12-10 PROCEDURE — 99220 ZZC INITIAL OBSERVATION CARE,LEVL III: CPT | Mod: 25 | Performed by: EMERGENCY MEDICINE

## 2019-12-10 PROCEDURE — 93010 ELECTROCARDIOGRAM REPORT: CPT | Mod: Z6 | Performed by: EMERGENCY MEDICINE

## 2019-12-10 PROCEDURE — 25000132 ZZH RX MED GY IP 250 OP 250 PS 637: Performed by: NURSE PRACTITIONER

## 2019-12-10 PROCEDURE — 83880 ASSAY OF NATRIURETIC PEPTIDE: CPT | Performed by: EMERGENCY MEDICINE

## 2019-12-10 PROCEDURE — 71046 X-RAY EXAM CHEST 2 VIEWS: CPT

## 2019-12-10 PROCEDURE — 00000146 ZZHCL STATISTIC GLUCOSE BY METER IP

## 2019-12-10 PROCEDURE — 25000125 ZZHC RX 250: Performed by: EMERGENCY MEDICINE

## 2019-12-10 PROCEDURE — 83036 HEMOGLOBIN GLYCOSYLATED A1C: CPT | Performed by: EMERGENCY MEDICINE

## 2019-12-10 PROCEDURE — 99285 EMERGENCY DEPT VISIT HI MDM: CPT | Mod: 25

## 2019-12-10 PROCEDURE — 93005 ELECTROCARDIOGRAM TRACING: CPT

## 2019-12-10 PROCEDURE — 85025 COMPLETE CBC W/AUTO DIFF WBC: CPT | Performed by: EMERGENCY MEDICINE

## 2019-12-10 PROCEDURE — 25000128 H RX IP 250 OP 636: Performed by: EMERGENCY MEDICINE

## 2019-12-10 PROCEDURE — 94640 AIRWAY INHALATION TREATMENT: CPT

## 2019-12-10 PROCEDURE — 93005 ELECTROCARDIOGRAM TRACING: CPT | Mod: 76

## 2019-12-10 PROCEDURE — G0378 HOSPITAL OBSERVATION PER HR: HCPCS

## 2019-12-10 PROCEDURE — 84484 ASSAY OF TROPONIN QUANT: CPT | Performed by: EMERGENCY MEDICINE

## 2019-12-10 PROCEDURE — 93010 ELECTROCARDIOGRAM REPORT: CPT | Mod: 76 | Performed by: EMERGENCY MEDICINE

## 2019-12-10 RX ORDER — ASPIRIN 81 MG/1
81 TABLET ORAL DAILY
Status: DISCONTINUED | OUTPATIENT
Start: 2019-12-11 | End: 2019-12-10

## 2019-12-10 RX ORDER — OXYCODONE HYDROCHLORIDE 5 MG/1
5 TABLET ORAL EVERY 8 HOURS PRN
Status: DISCONTINUED | OUTPATIENT
Start: 2019-12-10 | End: 2019-12-14 | Stop reason: HOSPADM

## 2019-12-10 RX ORDER — DEXTROSE MONOHYDRATE 25 G/50ML
25-50 INJECTION, SOLUTION INTRAVENOUS
Status: DISCONTINUED | OUTPATIENT
Start: 2019-12-10 | End: 2019-12-12

## 2019-12-10 RX ORDER — NITROGLYCERIN 0.4 MG/1
0.4 TABLET SUBLINGUAL EVERY 5 MIN PRN
Status: DISCONTINUED | OUTPATIENT
Start: 2019-12-10 | End: 2019-12-14 | Stop reason: HOSPADM

## 2019-12-10 RX ORDER — IPRATROPIUM BROMIDE AND ALBUTEROL SULFATE 2.5; .5 MG/3ML; MG/3ML
3 SOLUTION RESPIRATORY (INHALATION) ONCE
Status: COMPLETED | OUTPATIENT
Start: 2019-12-10 | End: 2019-12-10

## 2019-12-10 RX ORDER — ALUMINA, MAGNESIA, AND SIMETHICONE 2400; 2400; 240 MG/30ML; MG/30ML; MG/30ML
30 SUSPENSION ORAL EVERY 4 HOURS PRN
Status: DISCONTINUED | OUTPATIENT
Start: 2019-12-10 | End: 2019-12-14 | Stop reason: HOSPADM

## 2019-12-10 RX ORDER — ALBUTEROL SULFATE 90 UG/1
2 AEROSOL, METERED RESPIRATORY (INHALATION) EVERY 6 HOURS PRN
Status: DISCONTINUED | OUTPATIENT
Start: 2019-12-10 | End: 2019-12-14 | Stop reason: HOSPADM

## 2019-12-10 RX ORDER — ALBUTEROL SULFATE 0.83 MG/ML
2.5 SOLUTION RESPIRATORY (INHALATION) EVERY 6 HOURS PRN
Status: DISCONTINUED | OUTPATIENT
Start: 2019-12-10 | End: 2019-12-14 | Stop reason: HOSPADM

## 2019-12-10 RX ORDER — LEVOTHYROXINE SODIUM 50 UG/1
200 TABLET ORAL DAILY
Status: DISCONTINUED | OUTPATIENT
Start: 2019-12-11 | End: 2019-12-14 | Stop reason: HOSPADM

## 2019-12-10 RX ORDER — MORPHINE SULFATE 2 MG/ML
2 INJECTION, SOLUTION INTRAMUSCULAR; INTRAVENOUS ONCE
Status: COMPLETED | OUTPATIENT
Start: 2019-12-10 | End: 2019-12-10

## 2019-12-10 RX ORDER — NICOTINE POLACRILEX 4 MG
15-30 LOZENGE BUCCAL
Status: DISCONTINUED | OUTPATIENT
Start: 2019-12-10 | End: 2019-12-12

## 2019-12-10 RX ORDER — ACETAMINOPHEN 325 MG/1
650 TABLET ORAL EVERY 4 HOURS PRN
Status: DISCONTINUED | OUTPATIENT
Start: 2019-12-10 | End: 2019-12-14 | Stop reason: HOSPADM

## 2019-12-10 RX ORDER — ASPIRIN 81 MG/1
162 TABLET, CHEWABLE ORAL ONCE
Status: DISCONTINUED | OUTPATIENT
Start: 2019-12-10 | End: 2019-12-14 | Stop reason: HOSPADM

## 2019-12-10 RX ORDER — NALOXONE HYDROCHLORIDE 0.4 MG/ML
.1-.4 INJECTION, SOLUTION INTRAMUSCULAR; INTRAVENOUS; SUBCUTANEOUS
Status: DISCONTINUED | OUTPATIENT
Start: 2019-12-10 | End: 2019-12-14 | Stop reason: HOSPADM

## 2019-12-10 RX ORDER — ASPIRIN 81 MG/1
81 TABLET ORAL AT BEDTIME
Status: DISCONTINUED | OUTPATIENT
Start: 2019-12-11 | End: 2019-12-14 | Stop reason: HOSPADM

## 2019-12-10 RX ORDER — LIDOCAINE 40 MG/G
CREAM TOPICAL
Status: DISCONTINUED | OUTPATIENT
Start: 2019-12-10 | End: 2019-12-14 | Stop reason: HOSPADM

## 2019-12-10 RX ORDER — DOCUSATE SODIUM 100 MG/1
200 CAPSULE, LIQUID FILLED ORAL DAILY
Status: DISCONTINUED | OUTPATIENT
Start: 2019-12-11 | End: 2019-12-14 | Stop reason: HOSPADM

## 2019-12-10 RX ORDER — ATORVASTATIN CALCIUM 40 MG/1
40 TABLET, FILM COATED ORAL AT BEDTIME
Status: DISCONTINUED | OUTPATIENT
Start: 2019-12-10 | End: 2019-12-14 | Stop reason: HOSPADM

## 2019-12-10 RX ADMIN — ACETAMINOPHEN 650 MG: 325 TABLET, FILM COATED ORAL at 22:40

## 2019-12-10 RX ADMIN — ATORVASTATIN CALCIUM 40 MG: 40 TABLET, FILM COATED ORAL at 22:40

## 2019-12-10 RX ADMIN — Medication 1 MG: at 22:40

## 2019-12-10 RX ADMIN — IPRATROPIUM BROMIDE AND ALBUTEROL SULFATE 3 ML: .5; 3 SOLUTION RESPIRATORY (INHALATION) at 14:19

## 2019-12-10 RX ADMIN — MORPHINE SULFATE 2 MG: 2 INJECTION, SOLUTION INTRAMUSCULAR; INTRAVENOUS at 16:16

## 2019-12-10 ASSESSMENT — ENCOUNTER SYMPTOMS
ARTHRALGIAS: 0
EYE REDNESS: 0
DIFFICULTY URINATING: 0
HEADACHES: 0
ABDOMINAL PAIN: 0
COLOR CHANGE: 0
CONFUSION: 0
NECK STIFFNESS: 0
SHORTNESS OF BREATH: 1
FEVER: 0
CHILLS: 1
COUGH: 1
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
## 2019-12-10 NOTE — ED NOTES
Observation Brochure and Video    Patient and family informed of observation status based on provider's order.  Observation brochure was given and the video watched. Patient/Family stated understanding. Questions answered.  VINCENZO AVILA RN

## 2019-12-10 NOTE — TELEPHONE ENCOUNTER
"Pt walked into clinic, sent by dialysis unit for SOB & intermittent chest pain, pt related this started last evening. Chest pain is described as 'off & on\" & mild.  Pt has productive cough that is \"yellow sometimes\" [id number]   Lungs have rales, rhonchi & wheezes bilat & throughout.  Pt was recently Inpt in ICU for pneumonia.  Pt looks exhausted & states she has not felt well since she was discharged the end of November.  Huddled with PCP & recommendation is to go to ED. Transport here, report called to ED.  Roxanne Leyva RN  "
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
## 2019-12-10 NOTE — PHARMACY-ADMISSION MEDICATION HISTORY
"Admission medication history for the December 10, 2019 admission is complete.     Interview Sources:  Patient, Surescripts fill history, [place]     Reliability of Source: Poor, patient responded to most questions with \"I don't know\"    Medication Adherence: Poor, does not have recent fills for most medications    Changes made to PTA medication list (reason)  Added: none  Deleted: none  Changed: none    Additional medication history information:   -Patient said she used the albuterol inhaler and Spiriva inhaler this morning. When asked the last time she took any other medications she said \"I don't know\"  -Per [facility] discharge summary from 11/27/19, carvedilol, furosemide, and tramadol were stopped at discharge  -Per PCP note from 12/6/19, oxycodone dose was reduced from 10 mg tid prn to 5 mg tid prn. When asked if she takes 5 or 10 mg at home the patient was unsure but her daughter replied she takes 10 mg, unsure if patient started taking the lower dose or not  -Per fill history:  --oxycodone 5 mg last filled 12/6/19  --albuterol inhaler, Advair, Spiriva last filled 9/6/19  --albuterol nebs, atorvastatin, levothyroxine, nitroglycerin last filled 8/22/19  --ammonium lactate lotion last filled 5/10/19  -Per [place] :  Oxycodone 5 mg filled 12/6/19 for #90 (30 DS)  Tramadol 50 mg filled 11/22/19 for #20 (5 DS)  Oxycodone 10 mg filled 11/3/19 for #90 (30 DS)    Prior to Admission Medication List:  Prior to Admission medications    Medication Sig Last Dose Taking? Auth Provider   albuterol (PROAIR HFA/PROVENTIL HFA/VENTOLIN HFA) 108 (90 Base) MCG/ACT inhaler Inhale 2 puffs into the lungs every 6 hours as needed for shortness of breath / dyspnea or wheezing 12/10/2019 at Unknown time Yes Ailyn Nieves APRN CNP   fluticasone-salmeterol (ADVAIR) 250-50 MCG/DOSE inhaler Inhale 1 puff into the lungs every 12 hours 12/10/2019 at Unknown time Yes Nieves, Ailyn N, APRN CNP   albuterol (PROVENTIL) (2.5 MG/3ML) 0.083% neb solution " Take 1 vial (2.5 mg) by nebulization every 6 hours as needed for shortness of breath / dyspnea or wheezing Unknown at Unknown time  Ailyn Nieves APRN CNP   ammonium lactate (LAC-HYDRIN) 12 % external lotion Apply topically 2 times daily Unknown at Unknown time  Mireya Baldwin APRN CNP   ASPIR-LOW 81 MG EC tablet Take 1 tablet (81 mg) by mouth daily  Patient taking differently: Take 81 mg by mouth At Bedtime  Unknown at Unknown time  Mireya Baldwin APRN CNP   atorvastatin (LIPITOR) 40 MG tablet Take 1 tablet (40 mg) by mouth daily  Patient taking differently: Take 40 mg by mouth At Bedtime  Unknown at Unknown time  Ailyn Nieves APRN CNP   blood glucose monitoring (FREESTYLE LITE) test strip TEST BLOOD SUGAR TWO TIMES A DAY   Ailyn Nieves APRN CNP   blood glucose monitoring (FREESTYLE) lancets Test BS two times daily as directed   Ailyn Nieves APRN CNP   docusate sodium (COLACE) 100 MG capsule Take 200 mg by mouth daily Pt last took 11/19/19 Unknown at Unknown time  Ailyn Nieves APRN CNP   Emollient (EUCERIN CALMING DAILY MOIST) CREA Externally apply 1 dose. topically daily Unknown at Unknown time  Ailyn Nieves APRN CNP   levothyroxine (SYNTHROID/LEVOTHROID) 200 MCG tablet Take 1 tablet (200 mcg) by mouth daily Unknown at Unknown time  Ailyn Nieves APRN CNP   multivitamin RENAL (NEPHROCAPS/TRIPHROCAPS) 1 MG capsule Take 1 capsule by mouth daily Pt doesn't recall last dose 11/20/19 Unknown at Unknown time  Reported, Patient   nitroGLYcerin (NITROSTAT) 0.4 MG sublingual tablet Place 1 tablet (0.4 mg) under the tongue every 5 minutes as needed for chest pain Unknown at Unknown time  Ailyn Nieves APRN CNP   nystatin (MYCOSTATIN) 931945 UNIT/GM POWD Apply 1 g topically 3 times daily as needed Unknown at Unknown time  Mai Babcock MD   order for DME Equipment being ordered: Nebulizer   Roxanne Maldonado APRN CNP   order for DME Equipment being  ordered: Boost.   Ailyn Nieves APRN CNP   order for DME Equipment being ordered: Mireya Wilkinson APRN CNP   order for DME Equipment being ordered: Diabetic Shoes   Ailyn Nieves APRN CNP   order for DME Equipment being ordered: two pairs moderate knee high support hose   Ailyn Nieves APRN CNP   order for DME Equipment being ordered: Compression socks.  Strength:15-20 mmHg   Ailyn Nieves APRN CNP   order for DME One wheeled walker with seat and brakes and basket   Mai Babcock MD   oxyCODONE IR (ROXICODONE) 5 MG tablet Take 1 tablet (5 mg) by mouth every 8 hours as needed for moderate to severe pain Unknown at Unknown time  Ailyn Nieves APRN CNP   polyethylene glycol (MIRALAX/GLYCOLAX) packet Take 17 g by mouth daily as needed for constipation Unknown at Unknown time  Emery Hampton PA-C   tiotropium (SPIRIVA) 18 MCG inhaled capsule Inhale 1 capsule (18 mcg) into the lungs daily Unknown at Unknown time  Serge Funez MD   vitamin D3 (CHOLECALCIFEROL) 2000 units (50 mcg) tablet Take 1 tablet (2,000 Units) by mouth daily  Patient taking differently: Take 2,000 Units by mouth At Bedtime  Unknown at Unknown time  Ailyn Nieves APRN CNP       Time spent: 30 minutes    Medication history completed by:   Shaye Josue, Pharm.D.

## 2019-12-11 ENCOUNTER — APPOINTMENT (OUTPATIENT)
Dept: NUCLEAR MEDICINE | Facility: CLINIC | Age: 74
End: 2019-12-11
Attending: NURSE PRACTITIONER
Payer: COMMERCIAL

## 2019-12-11 ENCOUNTER — APPOINTMENT (OUTPATIENT)
Dept: CARDIOLOGY | Facility: CLINIC | Age: 74
End: 2019-12-11
Attending: NURSE PRACTITIONER
Payer: COMMERCIAL

## 2019-12-11 ENCOUNTER — PATIENT OUTREACH (OUTPATIENT)
Dept: GERIATRIC MEDICINE | Facility: CLINIC | Age: 74
End: 2019-12-11

## 2019-12-11 LAB
ANION GAP SERPL CALCULATED.3IONS-SCNC: 5 MMOL/L (ref 3–14)
BUN SERPL-MCNC: 24 MG/DL (ref 7–30)
CALCIUM SERPL-MCNC: 8.2 MG/DL (ref 8.5–10.1)
CHLORIDE SERPL-SCNC: 103 MMOL/L (ref 94–109)
CO2 SERPL-SCNC: 30 MMOL/L (ref 20–32)
CREAT SERPL-MCNC: 4 MG/DL (ref 0.52–1.04)
ERYTHROCYTE [DISTWIDTH] IN BLOOD BY AUTOMATED COUNT: 18.4 % (ref 10–15)
GFR SERPL CREATININE-BSD FRML MDRD: 10 ML/MIN/{1.73_M2}
GLUCOSE BLDC GLUCOMTR-MCNC: 110 MG/DL (ref 70–99)
GLUCOSE BLDC GLUCOMTR-MCNC: 114 MG/DL (ref 70–99)
GLUCOSE BLDC GLUCOMTR-MCNC: 122 MG/DL (ref 70–99)
GLUCOSE BLDC GLUCOMTR-MCNC: 137 MG/DL (ref 70–99)
GLUCOSE BLDC GLUCOMTR-MCNC: 179 MG/DL (ref 70–99)
GLUCOSE BLDC GLUCOMTR-MCNC: 73 MG/DL (ref 70–99)
GLUCOSE SERPL-MCNC: 89 MG/DL (ref 70–99)
HCT VFR BLD AUTO: 30 % (ref 35–47)
HGB BLD-MCNC: 9.3 G/DL (ref 11.7–15.7)
INTERPRETATION ECG - MUSE: NORMAL
MCH RBC QN AUTO: 31.7 PG (ref 26.5–33)
MCHC RBC AUTO-ENTMCNC: 31 G/DL (ref 31.5–36.5)
MCV RBC AUTO: 102 FL (ref 78–100)
PLATELET # BLD AUTO: 323 10E9/L (ref 150–450)
POTASSIUM SERPL-SCNC: 4.3 MMOL/L (ref 3.4–5.3)
RBC # BLD AUTO: 2.93 10E12/L (ref 3.8–5.2)
SODIUM SERPL-SCNC: 139 MMOL/L (ref 133–144)
WBC # BLD AUTO: 5.6 10E9/L (ref 4–11)

## 2019-12-11 PROCEDURE — 90937 HEMODIALYSIS REPEATED EVAL: CPT

## 2019-12-11 PROCEDURE — 00000146 ZZHCL STATISTIC GLUCOSE BY METER IP

## 2019-12-11 PROCEDURE — 96375 TX/PRO/DX INJ NEW DRUG ADDON: CPT | Mod: 59

## 2019-12-11 PROCEDURE — 83036 HEMOGLOBIN GLYCOSYLATED A1C: CPT | Performed by: NURSE PRACTITIONER

## 2019-12-11 PROCEDURE — G0378 HOSPITAL OBSERVATION PER HR: HCPCS

## 2019-12-11 PROCEDURE — 93306 TTE W/DOPPLER COMPLETE: CPT | Mod: 26 | Performed by: INTERNAL MEDICINE

## 2019-12-11 PROCEDURE — 25000132 ZZH RX MED GY IP 250 OP 250 PS 637: Performed by: PHYSICIAN ASSISTANT

## 2019-12-11 PROCEDURE — 85027 COMPLETE CBC AUTOMATED: CPT | Performed by: NURSE PRACTITIONER

## 2019-12-11 PROCEDURE — 93018 CV STRESS TEST I&R ONLY: CPT | Performed by: INTERNAL MEDICINE

## 2019-12-11 PROCEDURE — 25000131 ZZH RX MED GY IP 250 OP 636 PS 637: Performed by: NURSE PRACTITIONER

## 2019-12-11 PROCEDURE — 25000132 ZZH RX MED GY IP 250 OP 250 PS 637: Performed by: NURSE PRACTITIONER

## 2019-12-11 PROCEDURE — 93016 CV STRESS TEST SUPVJ ONLY: CPT | Performed by: INTERNAL MEDICINE

## 2019-12-11 PROCEDURE — 99225 ZZC SUBSEQUENT OBSERVATION CARE,LEVEL II: CPT | Mod: Z6 | Performed by: EMERGENCY MEDICINE

## 2019-12-11 PROCEDURE — 94640 AIRWAY INHALATION TREATMENT: CPT

## 2019-12-11 PROCEDURE — 78452 HT MUSCLE IMAGE SPECT MULT: CPT

## 2019-12-11 PROCEDURE — A9502 TC99M TETROFOSMIN: HCPCS | Performed by: EMERGENCY MEDICINE

## 2019-12-11 PROCEDURE — 25000128 H RX IP 250 OP 636: Performed by: EMERGENCY MEDICINE

## 2019-12-11 PROCEDURE — 25000125 ZZHC RX 250: Performed by: INTERNAL MEDICINE

## 2019-12-11 PROCEDURE — 40000275 ZZH STATISTIC RCP TIME EA 10 MIN

## 2019-12-11 PROCEDURE — 93017 CV STRESS TEST TRACING ONLY: CPT

## 2019-12-11 PROCEDURE — 93306 TTE W/DOPPLER COMPLETE: CPT

## 2019-12-11 PROCEDURE — G0257 UNSCHED DIALYSIS ESRD PT HOS: HCPCS

## 2019-12-11 PROCEDURE — 25800025 ZZH RX 258: Performed by: NURSE PRACTITIONER

## 2019-12-11 PROCEDURE — 96372 THER/PROPH/DIAG INJ SC/IM: CPT | Mod: XS

## 2019-12-11 PROCEDURE — 36415 COLL VENOUS BLD VENIPUNCTURE: CPT | Performed by: NURSE PRACTITIONER

## 2019-12-11 PROCEDURE — 80048 BASIC METABOLIC PNL TOTAL CA: CPT | Performed by: NURSE PRACTITIONER

## 2019-12-11 PROCEDURE — 34300033 ZZH RX 343: Performed by: EMERGENCY MEDICINE

## 2019-12-11 RX ORDER — ALBUTEROL SULFATE 0.83 MG/ML
2.5 SOLUTION RESPIRATORY (INHALATION) ONCE
Status: COMPLETED | OUTPATIENT
Start: 2019-12-11 | End: 2019-12-11

## 2019-12-11 RX ORDER — POLYETHYLENE GLYCOL 3350 17 G/17G
17 POWDER, FOR SOLUTION ORAL 2 TIMES DAILY PRN
Status: DISCONTINUED | OUTPATIENT
Start: 2019-12-11 | End: 2019-12-14 | Stop reason: HOSPADM

## 2019-12-11 RX ORDER — ALBUTEROL SULFATE 90 UG/1
2 AEROSOL, METERED RESPIRATORY (INHALATION) EVERY 5 MIN PRN
Status: DISCONTINUED | OUTPATIENT
Start: 2019-12-11 | End: 2019-12-11

## 2019-12-11 RX ORDER — AMINOPHYLLINE 25 MG/ML
50-100 INJECTION, SOLUTION INTRAVENOUS
Status: COMPLETED | OUTPATIENT
Start: 2019-12-11 | End: 2019-12-11

## 2019-12-11 RX ORDER — HEPARIN SODIUM 1000 [USP'U]/ML
2000 INJECTION, SOLUTION INTRAVENOUS; SUBCUTANEOUS
Status: DISCONTINUED | OUTPATIENT
Start: 2019-12-11 | End: 2019-12-11

## 2019-12-11 RX ORDER — ACYCLOVIR 200 MG/1
0-1 CAPSULE ORAL
Status: DISCONTINUED | OUTPATIENT
Start: 2019-12-11 | End: 2019-12-14 | Stop reason: HOSPADM

## 2019-12-11 RX ORDER — DIPHENHYDRAMINE HCL 25 MG
25 CAPSULE ORAL EVERY 6 HOURS PRN
Status: DISCONTINUED | OUTPATIENT
Start: 2019-12-11 | End: 2019-12-14 | Stop reason: HOSPADM

## 2019-12-11 RX ORDER — REGADENOSON 0.08 MG/ML
0.4 INJECTION, SOLUTION INTRAVENOUS ONCE
Status: COMPLETED | OUTPATIENT
Start: 2019-12-11 | End: 2019-12-11

## 2019-12-11 RX ORDER — DEXTROSE MONOHYDRATE 25 G/50ML
25 INJECTION, SOLUTION INTRAVENOUS ONCE
Status: DISCONTINUED | OUTPATIENT
Start: 2019-12-11 | End: 2019-12-14 | Stop reason: HOSPADM

## 2019-12-11 RX ADMIN — DEXTROSE 50 % IN WATER (D50W) INTRAVENOUS SYRINGE 25 ML: at 06:48

## 2019-12-11 RX ADMIN — AMINOPHYLLINE 50 MG: 25 INJECTION, SOLUTION INTRAVENOUS at 10:56

## 2019-12-11 RX ADMIN — OXYCODONE HYDROCHLORIDE 5 MG: 5 TABLET ORAL at 05:56

## 2019-12-11 RX ADMIN — Medication 1 CAPSULE: at 08:10

## 2019-12-11 RX ADMIN — ASPIRIN 81 MG: 81 TABLET, COATED ORAL at 21:41

## 2019-12-11 RX ADMIN — TETROFOSMIN 9.3 MCI.: 1.38 INJECTION, POWDER, LYOPHILIZED, FOR SOLUTION INTRAVENOUS at 10:00

## 2019-12-11 RX ADMIN — REGADENOSON 0.4 MG: 0.08 INJECTION, SOLUTION INTRAVENOUS at 10:44

## 2019-12-11 RX ADMIN — ATORVASTATIN CALCIUM 40 MG: 40 TABLET, FILM COATED ORAL at 21:42

## 2019-12-11 RX ADMIN — POLYETHYLENE GLYCOL 3350 17 G: 17 POWDER, FOR SOLUTION ORAL at 20:42

## 2019-12-11 RX ADMIN — LEVOTHYROXINE SODIUM 200 MCG: 50 TABLET ORAL at 08:10

## 2019-12-11 RX ADMIN — TETROFOSMIN 37.2 MCI.: 1.38 INJECTION, POWDER, LYOPHILIZED, FOR SOLUTION INTRAVENOUS at 10:45

## 2019-12-11 RX ADMIN — ALBUTEROL SULFATE 2.5 MG: 2.5 SOLUTION RESPIRATORY (INHALATION) at 14:36

## 2019-12-11 RX ADMIN — DIPHENHYDRAMINE HYDROCHLORIDE 25 MG: 25 CAPSULE ORAL at 05:05

## 2019-12-11 RX ADMIN — OXYCODONE HYDROCHLORIDE 5 MG: 5 TABLET ORAL at 19:43

## 2019-12-11 RX ADMIN — DOCUSATE SODIUM 200 MG: 100 CAPSULE, LIQUID FILLED ORAL at 08:10

## 2019-12-11 RX ADMIN — INSULIN ASPART 1 UNITS: 100 INJECTION, SOLUTION INTRAVENOUS; SUBCUTANEOUS at 03:00

## 2019-12-11 NOTE — CONSULTS
"Social Work Services Progress Note    Hospital Day: 2  Date of Initial Social Work Evaluation:  11/25/19  Collaborated with:  RNCC, pt, and clinic CLAU Presley    Data:  SW following 74 year old female for discharge planning.     Intervention:  Per JILLIAN, pt does not have access to food and would like SW to see and discuss. Chart reviewed, notes from RN and LSW through [facility] regarding discharge planning and vulnerable adult report filed. Per chart review, pt was accepted to [facility] [phone] on 12/5, however declined to go.  SW left message with Sakina [phone], RNCC through [facility] to discuss pt.     RNCC met with pt and per RNCC, pt is agreeable to TCU.   SW met with pt while pt at dialysis. Pt reports she lives with her son Ki and her 10 year old grandson. Pt reports that her son is sick, however is unable to tell SW what he is sick with. SW asked pt about living situation, pt stated \"It is good, we are all quiet.\" Pt states that her and her grandson are able to make meals. SW asked if any other family members assist with her and her grandson, pt reports \"sometimes\" however was unable to elaborate. Pt reports a nurse comes out sometimes, SW asked what kind of nurse and pt responded \"what kind of nurse do you mean, a nurse is a nurse.\"   Pt reports that she would like to go to a TCU. SW asked pt about previous admission where she declined TCU, but was accepted to [facility] TCU. Pt did not respond when asked. Asked pt if she would be agreeable to referral there, pt agreeable.   Pt reports she does not have any concerns at home.   SW left voicemail with [facility] TCU to ask about referral.   VELASQUEZ attempted to call son Ki [phone], no answer and was unable to leave voicemail.     VELASQUEZ received call back from Jessica in admissions at [facility]. Jessica does not have referral and would like information faxed. VELASQUEZ faxed information to [phone].     1530: Received call " from ALMA Presley through  [facility]. Sakina reports that pt has not been agreeable to TCU. Sakina visited the home and made the [facility] report last week. Sakina plans on following up with pt at next PCP appointment on 1/7/20. Per Sakina, pt has good rapport with PCP, however often is a no show for appointments. Informed Sakina referral has been made to [facility]CU. Sakina requests follow up for discharge plan.     Assessment:  Pt agreeable to TCU    Plan:    Anticipated Disposition:  Facility:  D    Barriers to d/c plan:  Bed availability     Follow Up:  SW to follow as needed.    ANA Caldwell, LGSW  6D Adult Acute Care SW  Ph:[phone]  Pager [phone]  Unit [phone]

## 2019-12-11 NOTE — PLAN OF CARE
Observation goals:    - Serial troponins and stress test complete. No  - Seen and cleared by consultant if applicable. No  - Adequate pain control on oral analgesia. Yes  - Vital signs normal or at patient baseline. Yes  - Safe disposition plan has been identified. No  Patient denies chest pain at this time. On tele, sinus rhythm hr 60.        BG at 0720 was 137.
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
## 2019-12-11 NOTE — PLAN OF CARE
- Serial troponins and stress test complete. No  - Seen and cleared by consultant if applicable. No  - Adequate pain control on oral analgesia. Yes  - Vital signs normal or at patient baseline. Yes  - Safe disposition plan has been identified. No  Patient denies chest pain at this time. On tele, sinus rhythm hr 60.

## 2019-12-11 NOTE — PROGRESS NOTES
[facility] Care Coordination Contact    Rec'd notification via Epic that member is currently admitted to [facility], under observation dx Chest Pain.  VM left with VELASQUEZ/Obs to introduce myself. Explained that CC will follow in Epic, request a call back with questions or concerns.  Explained that member has no homecare services and recently declined homecare.    12/11/19 Rec'd vm from [facility] requesting a return call [phone].  12/11/19 Call placed to Pratibha, reviewed notes from home visit and PCP conversation.  Pratibha states that member is willing to transfer to TCU and a referral to [facility]. Pratibha inquired on children in the home. Explained that CC did not observe children in home during the visit, but this was daytime.  Explained that I will review CC's notes and f/u with her.  12/11/19 VM left with Pratibha that per CC note,  Member is a legal guardian to a 18 year old and 13 year old and her son Ki and his 6 year old child also live in member's home. Explained that unsure if this information remain accurate as member has not allowed her CC to complete a home visit/assessment for the past two years (unable to contact).    Per [facility]. last home visit 11/30/2015.  12/12/19 Epic notes reviewed.  CC to follow, will complete assessment with member if she admits to TCU or at [facility] 1/7/20 appt.   Sakina Carrion RN, BC  Manager [facility] Care Coordinator   [phone]  (Fax)

## 2019-12-11 NOTE — PLAN OF CARE
- Serial troponins and stress test complete. No  - Seen and cleared by consultant if applicable. No  - Adequate pain control on oral analgesia. Yes  - Vital signs normal or at patient baseline. Yes  - Safe disposition plan has been identified. No
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
## 2019-12-11 NOTE — PROGRESS NOTES
[facility], [place]    Medicine Progress Note - Hospitalist Service       Date of Admission:  12/10/2019  Assessment & Plan   Kim Anne is a 74 year old female with a past medical history significant for s/p kidney donor (donated to her brother in 1988), CKD stage IV (On HD Tu/Th/Sa), COPD, MI, HTN, DM2, HLD, diverticulosis and hypothyroidism who presents here to the Emergency Department due to shortness of breath and left sided chest pain.      1. Chest Pain,  2. CKD stage IV (On HD Tu/Th/Sa),   Shortness of breath: Patient presented for chest pain and shortness of breath that has been ongoing for the last few days. She reports having them after dialysis yesterday. The chest pain was central. She denied any shortness of breath today. Denies any chest pain. EKG is unremarkable and was repeated in the ED with recurrence of chest pain- it remained unremarkable for acute process. Initial Troponin mildly elevated in setting of CKD on hemodialysis, 0.051. decreased.  BNP is elevated at 66,000. Chest xray has mild effusion, possible fluid overload/CHF. No peripheral swelling or edema.  Discussed with cardiology who recommended echo and a lexiscan. Echo showed Global and regional left ventricular function is normal with an EF of 60-65%. No regional wall motion abnormalities are seen. Mild pulmonary hypertension. No change since 11/23/19. Renal consult for dialysis and patient underwent dialysis today. Per nephrology, 3L were taken off to bring patient down to dry weight. Patient reports no change in her shortness of breath. Patient underwent a Lexiscan due to her chest pain which was normal.  - Continue cardiac monitoring overnight.      2. Dispo Planning: Pt reports that she hasn't eaten much because there is no food in her house. She lives with son and grandson and states that they will only give her food if she spends her money to buy it. They have been heating the house with her  stove. She has not been eating because there is no one to cook. She was seen by SW/CC in her home last week and it was recommended she go to a TCU. The patient declined. Today the patient expressed interest to go to a TCU. [facility] is working on placement   - [facility] working on placement       Chronic Medical Problems  #HTN:   #HLD: continue PTA lipitor  #COPD: continue PTA Advair, PTA Spiriva  #CAD, s/p MI (2006): continue PTA ASA 81mg  #DM2: LSSI ordered, glucose checks Q4hrs while NPO  #Diverticulosis  #Hypothyroid: continue PTA synthroid   #Chronic Pain: continue PTA Oxycodone      Disposition Plan   Expected discharge: Tomorrow, recommended to transitional care unit once safe disposition plan/ TCU bed available.  Entered: JUNIOR Kumari CNP 12/11/2019, 1:56 PM       The patient's care was discussed with the Attending Physician, Dr. Garibay, Bedside Nurse, Care Coordinator/ and Patient.    JUNIOR Kumari CNP  Hospitalist Service  [facility], [place]    ______________________________________________________________________    Interval History   No events overnight    Data reviewed today: I reviewed all medications, new labs and imaging results over the last 24 hours.     Physical Exam   Vital Signs: Temp: 98.3  F (36.8  C) Temp src: Oral BP: 137/66 Pulse: 59 Heart Rate: 68 Resp: 16 SpO2: 97 % O2 Device: Nasal cannula Oxygen Delivery: 1 LPM  Weight: 136 lbs 3.91 oz  Constitutional: awake, alert, cooperative, no apparent distress, and appears stated age  Eyes: Lids and lashes normal, pupils equal, round and reactive to light, extra ocular muscles intact, sclera clear, conjunctiva normal  ENT: Normocephalic, atraumatic,  oral pharynx with moist mucous membranes  Respiratory: No increased work of breathing, coarse breath sounds bilaterally  Cardiovascular: Regular rate and rhythm, normal S1 and S2, no S3 or S4, and no murmur noted  Abdomen: Right sided chest wall catheter.  Normal bowel sounds, soft, non-distended, non-tender, no masses palpated, no hepatosplenomegally  Skin: normal skin color, texture, turgor  Musculoskeletal: There is no redness, warmth, or swelling of the joints.  Full range of motion noted.  Motor strength is 5 out of 5 all extremities bilaterally.  Tone is normal.  Neurologic: Awake, alert, oriented to name, place and time.  Cranial nerves II-XII are grossly intact.  Motor is 5 out of 5 bilaterally. Sensory is intact.  Gait is normal.       Data   Recent Labs   Lab 19  0638 12/10/19  2306 12/10/19  1943 12/10/19  1412 19  0910   WBC 5.6  --   --  7.7 8.9   HGB 9.3*  --   --  10.2* 9.1*   *  --   --  101* 104*     --   --  352 356     --   --  137 142   POTASSIUM 4.3  --   --  3.7 4.8   CHLORIDE 103  --   --  101 107   CO2 30  --   --  33* 29   BUN 24  --   --  14 37*   CR 4.00*  --   --  2.66* 4.75*   ANIONGAP 5  --   --  3 6   FRANCES 8.2*  --   --  8.1* 8.5   GLC 89  --   --  131* 122*   ALBUMIN  --   --   --   --  3.0*   PROTTOTAL  --   --   --   --  7.1   BILITOTAL  --   --   --   --  0.2   ALKPHOS  --   --   --   --  175*   ALT  --   --   --   --  24   AST  --   --   --   --  19   TROPI  --  0.039 0.032 0.051*  --      Recent Results (from the past 24 hour(s))   Echocardiogram Complete    Narrative    [id number]  [id number]  [id number]  [id number]^KADE^JENNIFER^JER           [facility],[place]  Echocardiography Laboratory  [address] [place] [id number]     Name: JONEL CHAVEZ  MRN: [id number]  : 1945  Study Date: 2019 09:26 AM  Age: 74 yrs  Gender: Female  Patient Location: [facility]  Reason For Study: Chest Pain, Chest Pressure, Chest Tightness  Ordering Physician: JENNIFER BRAUN  Performed By: Ml Baird RDCS, RVT     BSA: 1.7 m2  Height: 65 in  Weight: 144 lb  BP: 113/66 mmHg  _____________________________________________________________________________  __         Procedure  Complete Portable Echo Adult.  _____________________________________________________________________________  __        Interpretation Summary  Global and regional left ventricular function is normal with an EF of 60-65%.  No regional wall motion abnormalities are seen.  The pattern of wall thickening is consistent with hypertrophic cardiomyopathy.  Basal septum measures 2.0cm. No LVOT gradient. No RICHELLE, trace MR.  Global right ventricular function is normal.  Mild pulmonary hypertension is present. Estimated pulmonary artery systolic  pressure is 43 mmHg plus right atrial pressure.  IVC diameter and respiratory changes fall into an intermediate range  suggesting an RA pressure of 8 mmHg.     This study was compared with the study from 11/23/19. No significant changes.  _____________________________________________________________________________  __        Left Ventricle  Global and regional left ventricular function is normal with an EF of 60-65%.  The pattern of wall thickening is consistent with hypertrophic cardiomyopathy.  Grade III or advanced diastolic dysfunction. No regional wall motion  abnormalities are seen.     Right Ventricle  The right ventricle is normal size. Global right ventricular function is  normal.     Atria  Atria not well visualized, but appears at least mildly dilated.        Mitral Valve  The mitral valve is normal. Trace mitral insufficiency is present.     Aortic Valve  Trileaflet aortic sclerosis without stenosis.     Tricuspid Valve  The tricuspid valve is normal. Mild tricuspid insufficiency is present.  Estimated pulmonary artery systolic pressure is 43 mmHg plus right atrial  pressure. Mild pulmonary hypertension is present.     Pulmonic Valve  On Doppler interrogation, there is no significant stenosis or regurgitation.     Vessels  The aorta root is normal. The thoracic aorta cannot be assessed. IVC diameter  and respiratory changes fall into an intermediate range  suggesting an RA  pressure of 8 mmHg.     Pericardium  No pericardial effusion is present.        Compared to Previous Study  This study was compared with the study from 19 .  _____________________________________________________________________________  __     MMode/2D Measurements & Calculations  IVSd: 1.7 cm  LVIDd: 3.7 cm  LVIDs: 2.6 cm  LVPWd: 1.1 cm  FS: 30.9 %  LV mass(C)d: 180.0 grams  LV mass(C)dI: 104.6 grams/m2  RWT: 0.57        Doppler Measurements & Calculations  MV E max alberto: 131.0 cm/sec  MV A max alberto: 50.2 cm/sec  MV E/A: 2.6  MV dec slope: 174.0 cm/sec2  MV dec time: 0.16 sec  TR max alberto: 329.0 cm/sec  TR max P.3 mmHg  E/E' av.0  Lateral E/e': 19.6  Medial E/e': 32.3        _____________________________________________________________________________  __        Report approved by: Enrique Reynolds 2019 10:59 AM      NM Lexiscan stress test (nuc card)   Result Value    Target     Baseline Systolic     Baseline Diastolic BP 65    Last Stress Systolic     Last Stress Diastolic BP 61    Baseline HR 63    Max HR 74    Calculated Percent HR 51    Rate Pressure Product 7,844.0    Left Ventricular EF 79    Narrative       The nuclear stress test is negative for inducible myocardial ischemia   or infarction.     Left ventricular function is normal.     The left ventricular ejection fraction at stress is 79%.     There is no prior study for comparison.    CT: Bilateral pleural effusions. No suspicious lung nodules. Aortic and   valvular calcifications. Heart is normal. Partially visualized upper   abdominal organs are within normal limits.         Medications       sodium chloride 0.9%  250 mL Intravenous Once in dialysis     sodium chloride 0.9%  300 mL Hemodialysis Machine Once     aspirin  162 mg Oral Once     aspirin  81 mg Oral At Bedtime     atorvastatin  40 mg Oral At Bedtime     dextrose  25 mL Intravenous Once     docusate sodium  200 mg Oral Daily      fluticasone-vilanterol  1 puff Inhalation Daily     heparin (porcine)  2,000 Units Hemodialysis Machine Once in dialysis     heparin  3 mL Intracatheter During Hemodialysis (from stock)     heparin  3 mL Intracatheter During Hemodialysis (from stock)     insulin aspart  1-4 Units Subcutaneous Q4H     levothyroxine  200 mcg Oral Daily     multivitamin RENAL  1 capsule Oral Daily     sodium chloride (PF)  3 mL Intracatheter Q8H     umeclidinium  1 puff Inhalation Daily

## 2019-12-11 NOTE — PROGRESS NOTES
Care Coordinator - Discharge Planning    Admission Date/Time:  12/10/2019  Attending MD:  Pascale Carranza MD     Data  Chart reviewed, discussed with interdisciplinary team.   Patient was admitted for:   1. Chest pain, unspecified type    2. Other chest pain    3. Cigarette smoker         Assessment   Concerns with insurance coverage for discharge needs: None.  Current Living Situation: Patient lives with family.  Support System: Pt states she lives with her son.  Per pt her daughter April often comes over intoxicated.  Pt states she feels safe in her home.  Services Involved: None  Transportation at Discharge: TBD  Transportation to Medical Appointments:    - Name of caregiver: Self  Barriers to Discharge: Medical stability     Pt status reviewed/discussed during care team rounds.  Anticipate possible discharge today pending heather scan results and after HD.   Team noted that [facility] report was recently filed d/t concerns with pt home situation/environment.    Pt has recently declined home care and TCU services.   Pt discharged from [facility]  On 11/23 she was discharged with plan of having [facility] RN, PT services.   Per chart she dialyzes at:  [facility]  Ph [phone] Fx [phone]  MWF.     Met with pt.  Introduced RNCC role.  Discussed anticipated plan for discharge.  Pt initially open to plan of having home care services but after some discussion indicated that TCU would be preferred.   Pt notes she feels weaker and would benefit from TCU.  Agreed to have SW f/u with pt regarding TCU.  If it is determined that TCU is not an option would plan for home health services - RN, PT, OT services.   Pt states she never received a call from [facility] to arrange home visits.   Pt notes no concerns with transportation.  Per pt if she is discharged to home someone from her family would be able to transport her home.  MOON form reviewed.   Reconfirmed pt home # [phone] and address: [address] [place] [address].       Spoke  with [facility] Liaison regarding referral status for this patient.  Per [facility] notes referral was received on 11/26 and the agency made three attempts to reach the patient.  [facility] closed the referral on 11/29.   Reviewed contact information and # noted above is the # [facility] has on file.       Discharge orders updated with home care orders.   Updated [facility].  Awaiting further input from [facility].       Coordination of Care and Referrals: Provided patient/family with options for Home Care and Skilled Nursing Facility, TCU.      Plan  Anticipated Discharge Date:  TBD  Anticipated Discharge Plan:  TBD    Amrita Browning, RN BSN, PHN RN Care Coordinator  Internal Medicine   [phone]  Pager: [phone]  Weekend RN Care Coordinator job code * * * [phone]  [facility] [employer]  12/11/2019 2:11 PM

## 2019-12-11 NOTE — PROGRESS NOTES
[facility] Care Coordination Contact    12/9/19 Rec'd vm from Ailynkiel Nieves PCP requesting a return call.  12/9/19 Call placed to PCP, reviewed recent PCC appointment  Ailyn shared that she offered TCU, member has declined home care services, stating that she has enough help in the home.  Ailyn shared that all medications were filled, stating that member looked well, clean and well kept. Ailyn shared that she does not want to push member and that she was agreeable to follow up in January.  Shared that a [facility] report was completed, CC was informed that they will likely not open the case.   Sakina Carrion RN, BC  Manager [facility] Care Coordinator   [phone]  (Fax)

## 2019-12-11 NOTE — PROGRESS NOTES
Social Work Services Progress Note    Hospital Day: 1  Date of Initial Social Work Evaluation:  n/a  Collaborated with:   Care Coordinator    Data:  ED SW received VM regarding care coordination.    Intervention:  VM received from Marissa Care Coordinator Sakina Carrion RN who has been following Kim post 11/27 hosp dishcarge, including 12/4 home visit.  ED SW notes a detailed 12/4 note in chart which also indicates assessment by SARABJIT Feng stating that the home is not suitable for habitation.    Per Sakina, Kim completed PCP follow up on 12/6 and declined TCU, HC services and states she has enough family support at home.     Sakina welcomed additional questions and coordination as needed.     Sakina Carrion RN ([phone])    Assessment:  Additional care services have been recommended and offered to Kim but she has declined them stating she has enough support at home.      Plan:     Anticipated Disposition:  TBD    Barriers to d/c plan:  Concerns that home environment is unsuitable.    Follow Up:  ED SW available to support, as needed.    LAMINE Peck, MSW  Social Work Services, Emergency Dept [facility]  Pager: [phone] Mon-Sat 9 am - 9 pm, on-call/after hours pager [phone]

## 2019-12-12 LAB
GLUCOSE BLDC GLUCOMTR-MCNC: 150 MG/DL (ref 70–99)
GLUCOSE BLDC GLUCOMTR-MCNC: 272 MG/DL (ref 70–99)
GLUCOSE BLDC GLUCOMTR-MCNC: 91 MG/DL (ref 70–99)

## 2019-12-12 PROCEDURE — 25000132 ZZH RX MED GY IP 250 OP 250 PS 637: Performed by: NURSE PRACTITIONER

## 2019-12-12 PROCEDURE — 96375 TX/PRO/DX INJ NEW DRUG ADDON: CPT | Mod: 59

## 2019-12-12 PROCEDURE — 25000132 ZZH RX MED GY IP 250 OP 250 PS 637: Performed by: PHYSICIAN ASSISTANT

## 2019-12-12 PROCEDURE — 00000146 ZZHCL STATISTIC GLUCOSE BY METER IP

## 2019-12-12 PROCEDURE — 90937 HEMODIALYSIS REPEATED EVAL: CPT

## 2019-12-12 PROCEDURE — 25000131 ZZH RX MED GY IP 250 OP 636 PS 637: Performed by: PHYSICIAN ASSISTANT

## 2019-12-12 PROCEDURE — 99224 ZZC SUBSEQUENT OBSERVATION CARE,LEVEL I: CPT | Mod: Z6 | Performed by: NURSE PRACTITIONER

## 2019-12-12 PROCEDURE — G0378 HOSPITAL OBSERVATION PER HR: HCPCS

## 2019-12-12 PROCEDURE — 25000128 H RX IP 250 OP 636: Performed by: INTERNAL MEDICINE

## 2019-12-12 PROCEDURE — 96372 THER/PROPH/DIAG INJ SC/IM: CPT

## 2019-12-12 PROCEDURE — 25800030 ZZH RX IP 258 OP 636: Performed by: INTERNAL MEDICINE

## 2019-12-12 PROCEDURE — G0257 UNSCHED DIALYSIS ESRD PT HOS: HCPCS

## 2019-12-12 PROCEDURE — 25000128 H RX IP 250 OP 636: Performed by: NURSE PRACTITIONER

## 2019-12-12 RX ORDER — DEXTROSE MONOHYDRATE 25 G/50ML
25-50 INJECTION, SOLUTION INTRAVENOUS
Status: DISCONTINUED | OUTPATIENT
Start: 2019-12-12 | End: 2019-12-14 | Stop reason: HOSPADM

## 2019-12-12 RX ORDER — HEPARIN SODIUM 1000 [USP'U]/ML
4000 INJECTION, SOLUTION INTRAVENOUS; SUBCUTANEOUS
Status: COMPLETED | OUTPATIENT
Start: 2019-12-12 | End: 2019-12-12

## 2019-12-12 RX ORDER — NICOTINE POLACRILEX 4 MG
15-30 LOZENGE BUCCAL
Status: DISCONTINUED | OUTPATIENT
Start: 2019-12-12 | End: 2019-12-14 | Stop reason: HOSPADM

## 2019-12-12 RX ORDER — CEFAZOLIN SODIUM 2 G/100ML
2 INJECTION, SOLUTION INTRAVENOUS
Status: DISCONTINUED | OUTPATIENT
Start: 2019-12-12 | End: 2019-12-13

## 2019-12-12 RX ADMIN — INSULIN ASPART 1 UNITS: 100 INJECTION, SOLUTION INTRAVENOUS; SUBCUTANEOUS at 23:45

## 2019-12-12 RX ADMIN — POLYETHYLENE GLYCOL 3350 17 G: 17 POWDER, FOR SOLUTION ORAL at 20:25

## 2019-12-12 RX ADMIN — ACETAMINOPHEN 650 MG: 325 TABLET, FILM COATED ORAL at 01:57

## 2019-12-12 RX ADMIN — LEVOTHYROXINE SODIUM 200 MCG: 50 TABLET ORAL at 08:38

## 2019-12-12 RX ADMIN — SODIUM CHLORIDE 300 ML: 9 INJECTION, SOLUTION INTRAVENOUS at 16:19

## 2019-12-12 RX ADMIN — Medication 1 CAPSULE: at 08:38

## 2019-12-12 RX ADMIN — HEPARIN SODIUM 4000 UNITS: 1000 INJECTION INTRAVENOUS; SUBCUTANEOUS at 16:24

## 2019-12-12 RX ADMIN — SODIUM CHLORIDE 250 ML: 9 INJECTION, SOLUTION INTRAVENOUS at 16:19

## 2019-12-12 RX ADMIN — DIPHENHYDRAMINE HYDROCHLORIDE 25 MG: 25 CAPSULE ORAL at 19:00

## 2019-12-12 RX ADMIN — HEPARIN SODIUM 3000 UNITS: 1000 INJECTION INTRAVENOUS; SUBCUTANEOUS at 19:38

## 2019-12-12 RX ADMIN — ATORVASTATIN CALCIUM 40 MG: 40 TABLET, FILM COATED ORAL at 21:23

## 2019-12-12 RX ADMIN — OXYCODONE HYDROCHLORIDE 5 MG: 5 TABLET ORAL at 03:44

## 2019-12-12 RX ADMIN — CEFAZOLIN SODIUM 2 G: 2 INJECTION, SOLUTION INTRAVENOUS at 21:23

## 2019-12-12 RX ADMIN — DOCUSATE SODIUM 200 MG: 100 CAPSULE, LIQUID FILLED ORAL at 08:38

## 2019-12-12 RX ADMIN — OXYCODONE HYDROCHLORIDE 5 MG: 5 TABLET ORAL at 22:04

## 2019-12-12 RX ADMIN — HEPARIN SODIUM 3000 UNITS: 1000 INJECTION INTRAVENOUS; SUBCUTANEOUS at 19:39

## 2019-12-12 RX ADMIN — ASPIRIN 81 MG: 81 TABLET, COATED ORAL at 21:23

## 2019-12-12 NOTE — PROGRESS NOTES
ER attending addendum  Please see JILLIAN H&P and daily progress note for full details.   I personally saw and examined pt and agree with the JILLIAN's plan of care.     She is a 74-year-old female with a history of end-stage renal disease who was admitted to the observation unit due to complaint of chest pain.  Patient had serial troponins as well as an echocardiogram done this morning that were stable.  Patient was seen by the renal dialysis team who took the patient to be dialyzed since they were unable to take off any fluid yesterday.  Plan versus for the patient be discharged around 6 PM today.  Unfortunately patient is concerned about her home living situation it is recommending transfer to TCU.  Patient feels like is not getting appropriate care and would like to be transferred to his TCU.      Will have the patient possibly transferred to TCU tomorrow if she is accepted.  Agree with plan of care.  Patient with no other acute issues at this time.    Signed:  Sugey Garibay MD  December 11, 2019 at 8:21 PM

## 2019-12-12 NOTE — PLAN OF CARE
Observation goals:     - Serial troponins and stress test complete.Troponin and stress test complete. Patient off floor for dialysis.   - Seen and cleared by consultant if applicable. No  - Adequate pain control on oral analgesia. Yes  - Vital signs normal or at patient baseline. Yes  - Safe disposition plan has been identified. No  Patient denies chest pain at this time. On tele, sinus rhythm hr 60.         BG at 0720 was 137.

## 2019-12-12 NOTE — PLAN OF CARE
Observation goals:     -Serial troponins and stress test complete.Troponin and stress test complete. Dialysis ordered.    - Seen and cleared by consultant if applicable. No  - Adequate pain control on oral analgesia. Yes  - Vital signs normal or at patient baseline. Yes  - Safe disposition plan has been identified. No, possible TCU placement.  Patient denies chest pain at this time.

## 2019-12-12 NOTE — PROGRESS NOTES
[facility] Care Coordination Contact    Epic notes reviewed. Call placed to Pratibha ENG to inquire on member. Pratibha states that [facility] is not able to accept member. Pratibha has referral to other TCU's.  Explained that per chart review, member has not had a CC visit for the past 4 years.   CC to follow.  Sakina Carrion RN, BC  Manager [facility] Care Coordinator   [phone]  (Fax)

## 2019-12-12 NOTE — PLAN OF CARE
Outpatient/Observation goals to be met before discharge home:   /58 (BP Location: Left arm)   Pulse 60   Temp 98.7  F (37.1  C) (Oral)   Resp 16   Wt 58.8 kg (129 lb 10.1 oz)   SpO2 93%   BMI 21.57 kg/m      - Serial troponins and stress test complete.: Met  - Seen and cleared by consultant if applicable: Met  - Adequate pain control on oral analgesia: On-going  - Vital signs normal or at patient baseline: Met  - Safe disposition plan has been identified: Not met

## 2019-12-12 NOTE — PROGRESS NOTES
"Social Work Services Progress Note    Hospital Day: 3  Date of Initial Social Work Evaluation:  11/25/19  Collaborated with:  TCU admissions    Data:  SW following 74 year old female pt for discharge planning.     Intervention:  SW spoke with [facility] Admissions, Jessica [phone] regarding referral. Jessica reports she has not looked referral over yet, but will be looking over shortly.     1300: Pt not accepted to [facility]. Met with pt to discuss additional referrals. Provided pt with list of medicare.gov rated facilities. Pt reports she does not have a preference to facilities. SW asked if she wanted to speak with son, pt declined. Pt reports she would like to be in [place] and was agreeable to referrals to [facility].   SW spoke with [facility] [phone], referral sent to [phone].  SW spoke with Aleksandr Stafford. Nydia reports that due to pt's address she may be closer to [facility] location or [place] location. Referral faxed to [phone]. [phone] Nydia stated pt is clinically accepted to [facility] [place]  [phone] f:[phone] and [facility] [place] [phone], however pt's MA has lapsed.   [facility]Z met with pt. Pt reports that she had renewed her MA \"months ago.\" Pt agreeable to calling [facility] with [facility]. SW and pt called [facility] [phone], pt's [employer] lapsed at the end of November, however pt has coverage for 90 days, until she renews. Pt will need to renew [employer], pt agreeable.   VELASQUEZ updated Nydia who will ask facility if they are able to accept. Awaiting call back from Nydia.     Assessment:  SW to continue to follow for discharge plan.     Plan:    Anticipated Disposition:  Facility:  TBD    Barriers to d/c plan:  Insurance coverage    Follow Up:  SW to continue to follow for discharge needs    ANA Caldwell, LGSW  6D Adult Acute Care [facility]  Ph:[phone]  Pager [phone]  Unit [phone]    "

## 2019-12-12 NOTE — PROGRESS NOTES
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     1. Chest Pain,  2. CKD stage IV (On HD Tu/Th/Sa),   Shortness of breath: Patient presented for chest pain and shortness of breath that has been ongoing for the last few days. She reports having them after dialysis yesterday. The chest pain was central. She denied any shortness of breath today. Denies any chest pain. EKG is unremarkable and was repeated in the ED with recurrence of chest pain- it remained unremarkable for acute process. Initial Troponin mildly elevated in setting of CKD on hemodialysis, 0.051. decreased.  BNP is elevated at 66,000. Chest xray has mild effusion, possible fluid overload/CHF. No peripheral swelling or edema.  Discussed with cardiology who recommended echo and a lexiscan. Echo showed Global and regional left ventricular function is normal with an EF of 60-65%. No regional wall motion abnormalities are seen. Mild pulmonary hypertension. No change since 11/23/19. Renal consult for dialysis and patient underwent ultrafiltration yesterday and will dialyze today.  Per nephrology, 3L were taken off to bring patient down to dry weight yesterday. Patient reports no change in her shortness of breath. Patient underwent a Lexiscan due to her chest pain which was normal.  - HD today     2. Dispo Planning: Pt reports that she hasn't eaten much because there is no food in her house. She lives with son and grandson and states that they will only give her food if she spends her money to buy it. They have been heating the house with her stove. She has not been eating because  there is no one to cook. She was seen by SW/CC in her home last week and it was recommended she go to a TCU. The patient declined. Today the patient expressed interest to go to a TCU. [facility] is working on placement   - [facility] working on placement        Chronic Medical Problems  #HTN:   #HLD: continue PTA lipitor  #COPD: continue PTA Advair, PTA Spiriva  #CAD, s/p MI (2006): continue PTA ASA 81mg  #DM2: LSSI ordered, glucose checks Q4hrs while NPO  #Diverticulosis  #Hypothyroid: continue PTA synthroid   #Chronic Pain: continue PTA Oxycodone    Addendum:   Dr. Laurent called from nephrology.  She is concerned patient may have infection of fistula.  Recommended starting Ancef and consulting Dr. Allen tomorrow am.  Patient is stable, afebrile, stable vital signs, no leukocytosis.   -Ancef 2 g IV after dialysis three times per week  -will consult Dr. Allen in the am  -repeat CBC, BMP in am        FEN: dialysis diet  Lines: right internal jugular tunneled CVC   Prophylaxis: SCDs          Consults:   nephrology         Discharge Planning:   Pending TCU placement        Interval History:   Kim is doing ok this am.  Still reports some SOB and cough.  Feels slightly improved from admission. Awaiting TCU placement and is agreeable to that.     ROS:   Constitutional: No fevers/chills. Tolerating diet.   Cardiovascular: No chest pain or palpitations.   Respiratory: +cough, +SOB  GI: No abdominal pain. No N/V.      : No urinary complaints.   Musculoskeletal: Denies pain.           Physical Exam:   /67 (BP Location: Left arm)   Pulse 59   Temp 98.4  F (36.9  C) (Oral)   Resp 16   Wt 58.8 kg (129 lb 10.1 oz)   SpO2 92%   BMI 21.57 kg/m       GENERAL: Alert and oriented x 3. NAD.   HEENT: Anicteric sclera. Mucous membranes moist.   CV: RRR. S1, S2. No murmurs appreciated.   RESPIRATORY: Effort normal. Lungs CTAB with no wheezing, rales, rhonchi.   GI: Abdomen soft and non distended with normoactive bowel sounds  present in all quadrants. No tenderness, rebound, guarding.   NEUROLOGICAL: No focal deficits. Moves all extremities.    EXTREMITIES: No peripheral edema. Intact bilateral pedal pulses.   SKIN: No jaundice. No rashes.     Medication list reviewed.   Labs and imaging were reviewed.     JUNIOR Barth, CNP  [facility] #[id number]

## 2019-12-12 NOTE — PLAN OF CARE
Outpatient/Observation goals to be met before discharge home:   /57 (BP Location: Left arm)   Pulse 59   Temp 97.5  F (36.4  C) (Oral)   Resp 16   Wt 58.8 kg (129 lb 10.1 oz)   SpO2 98%   BMI 21.57 kg/m      - Serial troponins and stress test complete.: Met  - Seen and cleared by consultant if applicable: Met  - Adequate pain control on oral analgesia: On-going  - Vital signs normal or at patient baseline: Met  - Safe disposition plan has been identified: Not met, pending TCU availability and placement.

## 2019-12-12 NOTE — PROGRESS NOTES
HEMODIALYSIS TREATMENT NOTE    Date: 12/11/2019  Time: 6:16 PM    Data:  Pre Wt: 61.8 kg (136 lb 3.9 oz)   Desired Wt: 58.8 kg   Post Wt: 58.8 kg (129 lb 10.1 oz)  Weight change: 3 kg  Ultrafiltration - Post Run Net Total Removed (mL): 3000 mL  Vascular Access Status: CVC  Dialyzer Rinse: Moderate  Total Blood Volume Processed: 0 L   Total Dialysis (Treatment) Time: 3 hr UF   Dialysate Bath : None UF only  Heparin: None    Lab:   No    Interventions:  Pt arrived for UF only run, 3 kg off ordered in 2 hrs. Pt did stand for weights. Noted crackles in lungs, also wheezing. Tx started and there was steep drop in crit line, -9.5% with 1.3 kg off.   Dr Gerber here, increased run to 3 hrs UF.  Able to pull w/o difficulty. Nebulizer ordered and admin per order.  End of tx 3 kg off with -14.7% blood vol chg. Pt seemed sl disoriented at end, had been sleeping soundly. Reported for floor RN. BG check 110, no food ordered yet waiting for final result of scan, NPO in case heart cath needed. Pt states she is not eating well, likely in weight loss pattern.     Assessment:  Pt has vol on, good result from UF. Pt likely needs some TCU time, may be home issues, she did mention that house is cold and supplement with running stove. No one is there for her to help with cooking except son who has health issues as well. Care Coordinator is involved.      Plan:    Next tx per renal team, may need PT consult.

## 2019-12-12 NOTE — PLAN OF CARE
Observation goals:     -Serial troponins and stress test complete.Troponin and stress test complete. Dialysis scheduled for 4 PM.    - Seen and cleared by consultant if applicable. No  - Adequate pain control on oral analgesia. Yes  - Vital signs normal or at patient baseline. Yes  - Safe disposition plan has been identified. No, possible TCU placement.  Patient denies chest pain at this time.

## 2019-12-12 NOTE — PLAN OF CARE
Outpatient/Observation goals to be met before discharge home:   BP 98/51 (BP Location: Left arm)   Pulse 71   Temp 98.2  F (36.8  C) (Oral)   Resp 16   Wt 58.8 kg (129 lb 10.1 oz)   SpO2 98%   BMI 21.57 kg/m      - Serial troponins and stress test complete.: Met  - Seen and cleared by consultant if applicable: Met  - Adequate pain control on oral analgesia: On-going  - Vital signs normal or at patient baseline: Met  - Safe disposition plan has been identified: Not met

## 2019-12-12 NOTE — PLAN OF CARE
Observation goals:     - Serial troponins and stress test complete. Patient off floor for lexiscan.   - Seen and cleared by consultant if applicable. No  - Adequate pain control on oral analgesia. Yes  - Vital signs normal or at patient baseline. Yes  - Safe disposition plan has been identified. No  Patient denies chest pain at this time. On tele, sinus rhythm hr 60.         BG at 0720 was 137.

## 2019-12-13 ENCOUNTER — APPOINTMENT (OUTPATIENT)
Dept: ULTRASOUND IMAGING | Facility: CLINIC | Age: 74
End: 2019-12-13
Attending: PHYSICIAN ASSISTANT
Payer: COMMERCIAL

## 2019-12-13 LAB
ANION GAP SERPL CALCULATED.3IONS-SCNC: 5 MMOL/L (ref 3–14)
BASOPHILS # BLD AUTO: 0.1 10E9/L (ref 0–0.2)
BASOPHILS NFR BLD AUTO: 1.7 %
BUN SERPL-MCNC: 18 MG/DL (ref 7–30)
CALCIUM SERPL-MCNC: 8.4 MG/DL (ref 8.5–10.1)
CHLORIDE SERPL-SCNC: 102 MMOL/L (ref 94–109)
CO2 SERPL-SCNC: 28 MMOL/L (ref 20–32)
CREAT SERPL-MCNC: 3.6 MG/DL (ref 0.52–1.04)
CV STRESS MAX HR HE: 74
DIFFERENTIAL METHOD BLD: ABNORMAL
EOSINOPHIL # BLD AUTO: 0.4 10E9/L (ref 0–0.7)
EOSINOPHIL NFR BLD AUTO: 8.8 %
ERYTHROCYTE [DISTWIDTH] IN BLOOD BY AUTOMATED COUNT: 17.6 % (ref 10–15)
GFR SERPL CREATININE-BSD FRML MDRD: 12 ML/MIN/{1.73_M2}
GLUCOSE BLDC GLUCOMTR-MCNC: 104 MG/DL (ref 70–99)
GLUCOSE BLDC GLUCOMTR-MCNC: 149 MG/DL (ref 70–99)
GLUCOSE BLDC GLUCOMTR-MCNC: 150 MG/DL (ref 70–99)
GLUCOSE BLDC GLUCOMTR-MCNC: 213 MG/DL (ref 70–99)
GLUCOSE BLDC GLUCOMTR-MCNC: 94 MG/DL (ref 70–99)
GLUCOSE SERPL-MCNC: 78 MG/DL (ref 70–99)
HBA1C MFR BLD: 5.7 % (ref 0–5.6)
HCT VFR BLD AUTO: 33.3 % (ref 35–47)
HGB BLD-MCNC: 10.2 G/DL (ref 11.7–15.7)
IMM GRANULOCYTES # BLD: 0 10E9/L (ref 0–0.4)
IMM GRANULOCYTES NFR BLD: 0.6 %
LYMPHOCYTES # BLD AUTO: 1.3 10E9/L (ref 0.8–5.3)
LYMPHOCYTES NFR BLD AUTO: 28 %
MCH RBC QN AUTO: 31.7 PG (ref 26.5–33)
MCHC RBC AUTO-ENTMCNC: 30.6 G/DL (ref 31.5–36.5)
MCV RBC AUTO: 103 FL (ref 78–100)
MONOCYTES # BLD AUTO: 0.4 10E9/L (ref 0–1.3)
MONOCYTES NFR BLD AUTO: 7.7 %
NEUTROPHILS # BLD AUTO: 2.6 10E9/L (ref 1.6–8.3)
NEUTROPHILS NFR BLD AUTO: 53.2 %
NRBC # BLD AUTO: 0 10*3/UL
NRBC BLD AUTO-RTO: 0 /100
NUC STRESS EJECTION FRACTION: 79 %
PLATELET # BLD AUTO: 319 10E9/L (ref 150–450)
POTASSIUM SERPL-SCNC: 4.3 MMOL/L (ref 3.4–5.3)
RATE PRESSURE PRODUCT: 7844
RBC # BLD AUTO: 3.22 10E12/L (ref 3.8–5.2)
SODIUM SERPL-SCNC: 134 MMOL/L (ref 133–144)
STRESS ECHO BASELINE DIASTOLIC HE: 65
STRESS ECHO BASELINE HR: 63
STRESS ECHO BASELINE SYSTOLIC BP: 133
STRESS ECHO CALCULATED PERCENT HR: 51 %
STRESS ECHO LAST STRESS DIASTOLIC BP: 61
STRESS ECHO LAST STRESS SYSTOLIC BP: 106
STRESS ECHO TARGET HR: 146
WBC # BLD AUTO: 4.8 10E9/L (ref 4–11)

## 2019-12-13 PROCEDURE — 85025 COMPLETE CBC W/AUTO DIFF WBC: CPT | Performed by: NURSE PRACTITIONER

## 2019-12-13 PROCEDURE — 99225 ZZC SUBSEQUENT OBSERVATION CARE,LEVEL II: CPT | Mod: Z6 | Performed by: PHYSICIAN ASSISTANT

## 2019-12-13 PROCEDURE — 96372 THER/PROPH/DIAG INJ SC/IM: CPT

## 2019-12-13 PROCEDURE — G0378 HOSPITAL OBSERVATION PER HR: HCPCS

## 2019-12-13 PROCEDURE — 93990 DOPPLER FLOW TESTING: CPT

## 2019-12-13 PROCEDURE — 00000146 ZZHCL STATISTIC GLUCOSE BY METER IP

## 2019-12-13 PROCEDURE — 36415 COLL VENOUS BLD VENIPUNCTURE: CPT | Performed by: NURSE PRACTITIONER

## 2019-12-13 PROCEDURE — 25000132 ZZH RX MED GY IP 250 OP 250 PS 637: Performed by: NURSE PRACTITIONER

## 2019-12-13 PROCEDURE — 25000132 ZZH RX MED GY IP 250 OP 250 PS 637: Performed by: PHYSICIAN ASSISTANT

## 2019-12-13 PROCEDURE — 80048 BASIC METABOLIC PNL TOTAL CA: CPT | Performed by: NURSE PRACTITIONER

## 2019-12-13 RX ORDER — AMOXICILLIN AND CLAVULANATE POTASSIUM 500; 125 MG/1; MG/1
1 TABLET, FILM COATED ORAL
Status: DISCONTINUED | OUTPATIENT
Start: 2019-12-13 | End: 2019-12-14 | Stop reason: HOSPADM

## 2019-12-13 RX ORDER — HYDROXYZINE HYDROCHLORIDE 25 MG/1
25 TABLET, FILM COATED ORAL 3 TIMES DAILY PRN
Status: DISCONTINUED | OUTPATIENT
Start: 2019-12-13 | End: 2019-12-14 | Stop reason: HOSPADM

## 2019-12-13 RX ADMIN — ATORVASTATIN CALCIUM 40 MG: 40 TABLET, FILM COATED ORAL at 23:20

## 2019-12-13 RX ADMIN — UMECLIDINIUM 1 PUFF: 62.5 AEROSOL, POWDER ORAL at 07:42

## 2019-12-13 RX ADMIN — DIPHENHYDRAMINE HYDROCHLORIDE 25 MG: 25 CAPSULE ORAL at 20:12

## 2019-12-13 RX ADMIN — DIPHENHYDRAMINE HYDROCHLORIDE 25 MG: 25 CAPSULE ORAL at 02:44

## 2019-12-13 RX ADMIN — FLUTICASONE FUROATE AND VILANTEROL TRIFENATATE 1 PUFF: 200; 25 POWDER RESPIRATORY (INHALATION) at 07:42

## 2019-12-13 RX ADMIN — DOCUSATE SODIUM 200 MG: 100 CAPSULE, LIQUID FILLED ORAL at 07:41

## 2019-12-13 RX ADMIN — OXYCODONE HYDROCHLORIDE 5 MG: 5 TABLET ORAL at 06:07

## 2019-12-13 RX ADMIN — LEVOTHYROXINE SODIUM 200 MCG: 50 TABLET ORAL at 07:41

## 2019-12-13 RX ADMIN — OXYCODONE HYDROCHLORIDE 5 MG: 5 TABLET ORAL at 20:12

## 2019-12-13 RX ADMIN — HYDROXYZINE HYDROCHLORIDE 25 MG: 25 TABLET, FILM COATED ORAL at 05:34

## 2019-12-13 RX ADMIN — Medication 1 CAPSULE: at 07:41

## 2019-12-13 RX ADMIN — ACETAMINOPHEN 650 MG: 325 TABLET, FILM COATED ORAL at 05:08

## 2019-12-13 RX ADMIN — AMOXICILLIN AND CLAVULANATE POTASSIUM 1 TABLET: 500; 125 TABLET, FILM COATED ORAL at 14:28

## 2019-12-13 RX ADMIN — ASPIRIN 81 MG: 81 TABLET, COATED ORAL at 23:20

## 2019-12-13 NOTE — PROGRESS NOTES
Social Work Services Progress Note    Hospital Day: 4  Date of Initial Social Work Evaluation:  11/25/19  Collaborated with:  Pt, Aleksandr Stafford     Data:  SW following 74 year old female pt for discharge planning.     Intervention:  SW received call from Aleksandr Stafford stating they are able to accept pt and will be able to help her renew her MA application. Updated pt that she is able to go to [facility] [place] or [facility] [place]. Pt reports she would like to go to [facility] [place]. SW asked pt if she has family/friend that will take her to dialysis tomorrow, she stated she had someone to take her.   SW called [facility] for w/c ride [phone], next available ride is at 3:45PM.  Updated Nydia, pt, JILLIAN, and charge RN.     Assessment:  Pt agreeable to TCU stay.     Plan:    Anticipated Disposition:  Facility:  [facility] [place] [address] [address]     Barriers to d/c plan:  NA    Follow Up:  SW available as needed.    ANA Caldwell, LGSW  6D Adult Acute Care SW  Ph:[phone]  Pager [phone]  Unit [phone]

## 2019-12-13 NOTE — PLAN OF CARE
Outpatient/Observation goals to be met before discharge home:    PRIMARY DIAGNOSIS: Chest pain  OUTPATIENT/OBSERVATION GOALS TO BE MET BEFORE DISCHARGE:  Serial troponins and stress test complete: YES  Seen and cleared by consultant if applicable: NO-none ordered at this time.  Adequate pain control on oral analgesia: YES  Vital signs normal or at patient baseline: YES-vitals WNL.  Safe disposition plan has been identified: NO-yet to be determined.  *Nurse to notify MD when observation goals have been met and patient is ready for discharge.

## 2019-12-13 NOTE — CONSULTS
Transplant Surgery  Inpatient Daily Progress Note  12/13/2019    Assessment & Plan:   Kim Anne is a left handed 74 year old female who donated her left kidney to there brother in 1988. She also has a complex past medical history including hypertension, diabetes mellitus, COPD, SVT, dyslipidemia and multiple abdominal operations. She underwent a radio-cephalic AV fistula creation on 11/22/2019. She was admitted due to dyspnea that has now improved. Some erythema was noted around her AV fistula site concerning for infection     -please start augmentin for 1 week  -please obtain an ultrasound to evaluate AV fistula function  -will continue to monitor AV fistula for signs of infection  -the Transplant Surgery team will continue to see the patient peripherally    JILLIAN/Fellow/Resident Provider: Massimo Lamb MD    Faculty: Susana Allen M.D.    I have reviewed history, examined patient and discussed plan with the fellow/resident/JILLIAN.  I concur with the findings in this note.             _________________________________________________________________  Transplant History:   4/13/1988 (Kidney), Postoperative day:      Interval History: History is obtained from the patient  Kim Anne is a left handed 74 year old female who donated her left kidney to there brother in 1988. She also has a complex past medical history including hypertension, diabetes mellitus, COPD, SVT, dyslipidemia and multiple abdominal operations. She has had biopsy proven diabetic nephropathy from 02/2015 resulting in chronic kidney failure. She has been on hemodialysis starting 5/2/19 using a tunneled line. She underwent a radio-cephalic AV fistula creation on 11/22/2019. She was admitted on 12/10/19 due to dyspnea. Since admission, she underwent hemodialysis and had fluid removed and she continues to feel better. She denies any symptoms. Her AV fistula site has minimal swelling but with erythema noted.    ROS:   A 10-point  review of systems was negative except as noted above.    Meds:    aspirin  162 mg Oral Once     aspirin  81 mg Oral At Bedtime     atorvastatin  40 mg Oral At Bedtime     ceFAZolin  2 g Intravenous Once per day on Tue Thu Sat     dextrose  25 mL Intravenous Once     docusate sodium  200 mg Oral Daily     fluticasone-vilanterol  1 puff Inhalation Daily     levothyroxine  200 mcg Oral Daily     multivitamin RENAL  1 capsule Oral Daily     sodium chloride (PF)  3 mL Intracatheter Q8H     umeclidinium  1 puff Inhalation Daily       Physical Exam:     Admit Weight: 65.3 kg (144 lb)    Current vitals:   /57 (BP Location: Left arm)   Pulse 59   Temp 98.7  F (37.1  C) (Oral)   Resp 16   Wt 58.8 kg (129 lb 10.1 oz)   SpO2 95%   BMI 21.57 kg/m      Vital sign ranges:    Temp:  [97.6  F (36.4  C)-98.7  F (37.1  C)] 98.7  F (37.1  C)  Pulse:  [56-72] 59  Heart Rate:  [57-64] 59  Resp:  [8-18] 16  BP: ()/(51-72) 102/57  SpO2:  [90 %-97 %] 95 %    General Appearance: in no apparent distress.   Skin: normal, warm   Heart: irregular rate and rhythm  Lungs: non-labored breathing on room air  Abdomen: The abdomen is soft, non-tender, non-distended  Extremities: RUE AV fistula with good thrill and bruit noted, blanching erythema noted, no tenderness noted    Data:   CMP  Recent Labs   Lab 12/13/19  0553 12/11/19  0638    139   POTASSIUM 4.3 4.3   CHLORIDE 102 103   CO2 28 30   GLC 78 89   BUN 18 24   CR 3.60* 4.00*   GFRESTIMATED 12* 10*   GFRESTBLACK 14* 12*   FRANCES 8.4* 8.2*     CBC  Recent Labs   Lab 12/13/19  0553 12/11/19  0638   HGB 10.2* 9.3*   WBC 4.8 5.6    323   A1C  --  5.7*

## 2019-12-13 NOTE — PROGRESS NOTES
Social Work Services Discharge Note      Patient Name:  Kim Anne     Anticipated Discharge Date:  12/13/19    Discharge Disposition:   TCU:  [facility] [place] [address] [place] [address]    Following MD:  Per facility      Pre-Admission Screening (PAS) online form has been completed.  The Level of Care (LOC) is:  Determined  Confirmation Code is:  [id number]  Patient/caregiver informed of referral to [facility] Line for Pre-Admission Screening for skilled nursing facility (SNF) placement and to expect a phone call post discharge from SNF.     Additional Services/Equipment Arranged:  [employer] w/c [employer] [phone] arranged for 3:45PM     Patient / Family response to discharge plan:  Pt in agreement with discharge plan.      Persons notified of above discharge plan:  Pt, Nydia Brandon Liaison, JILLIAN, charge RN     Staff Discharge Instructions:  Please fax discharge orders and signed hard scripts for any controlled substances.  Please print a packet and send with patient.     Medicare Notice of Rights provided to the patient/family:  YES    ANA Caldwell, GRETCHEN  6D Adult Acute Care   Ph:[phone]  Pager [phone]  Unit [phone]

## 2019-12-13 NOTE — PLAN OF CARE
- Serial troponins and stress test complete: Yes  - Seen and cleared by consultant if applicable: No  - Adequate pain control on oral analgesia: Yes  - Vital signs normal or at patient baseline: Yes  - Safe disposition plan has been identified: No, awaiting TCU placement    Call light within reach, able to make needs known. Will continue to monitor and follow POC.

## 2019-12-13 NOTE — PROGRESS NOTES
ED OBSERVATION PROGRESS NOTE:  S: Kim Anne is a 74 year old female with a past medical history significant for s/p kidney donor (donated to her brother in 1988), CKD stage IV (On HD Tu/Th/Sa), COPD, MI, HTN, DM2, HLD, diverticulosis and hypothyroidism who presents here to the Emergency Department due to shortness of breath and left sided chest pain.     Chief Complaint   Patient presents with     Shortness of Breath     Pt states that she has been SOB since last night     1. Simple chronic bronchitis (H)    2. Chest pain, unspecified type    3. Other chest pain    4. Cigarette smoker    5. ESRD on dialysis (H)        Problem List:  Patient Active Problem List   Diagnosis     Hyperlipidemia LDL goal <100     ARAUZ (dyspnea on exertion)     COPD (chronic obstructive pulmonary disease) (H)     Edema     Fatigue     History of MI (myocardial infarction)     Snores     Knee pain, left     Bunion of great toe of left foot     Dystrophic nail     Arthritis     Peripheral vascular disease (H)     Chronic low back pain     Health Care Home     CKD (chronic kidney disease) stage 4, GFR 15-29 ml/min (H)     Abnormal gait     Health maintenance examination     Vitamin D deficiency     Constipation     Bradycardia     Callus of foot     Other chronic pain     Cataracts, bilateral     Hyperopic astigmatism of both eyes     Presbyopia     Asymptomatic bunion, right     Acquired hypothyroidism     Type 2 diabetes mellitus with diabetic chronic kidney disease (H)     Obesity (BMI 30-39.9)     History of sick sinus syndrome     Nephrotic syndrome     Pneumonia of both lower lobes due to infectious organism (H)     Grief     Tobacco dependence     Hyperkalemia     Type 2 diabetes, HbA1C goal < 8% (H)     Smoker     Single kidney     Hypothyroid     Hypertension goal BP (blood pressure) < 140/90     Diabetic nephropathy (H)     Hypoalbuminemia     Skin lesion of hand     ERRONEOUS ENCOUNTER--DISREGARD     Atypical chest pain      COPD exacerbation (H)     Dialysis patient (H)     Atrial tachycardia (H)     Adjustment disorder with depressed mood     ESRD on dialysis (H)     Encounter regarding vascular access for dialysis for ESRD (H)     Hypotension, unspecified hypotension type       MEDS:   No current outpatient medications on file.       ALLERGIES:    Allergies   Allergen Reactions     Contrast Dye Hives and Itching     Clonidine      She had as IP and thinks it made her itchy     Diatrizoate Other (See Comments)     Diltiazem      Severe bradycardia     Iodine-131        O:/57 (BP Location: Left arm)   Pulse 59   Temp 98.7  F (37.1  C) (Oral)   Resp 16   Wt 58.8 kg (129 lb 10.1 oz)   SpO2 95%   BMI 21.57 kg/m      Physical Exam   Constitutional: Pt is oriented to person, place, and time.Pt appears well-developed and well-nourished.   HENT:   Head: Normocephalic and atraumatic.   Eyes: Conjunctivae are normal. Pupils are equal, round, and reactive to light.   Neck: Normal range of motion. Neck supple.   Cardiovascular: Normal rate, regular rhythm, normal heart sounds and intact distal pulses.    Pulmonary/Chest: Effort normal and breath sounds normal. No respiratory distress. Pt has no wheezes. Pt has no rales  Abdominal: Soft. Bowel sounds are normal. Pt exhibits no distension and no mass. No tenderness. Pt has no rebound and no guarding.   Musculoskeletal: Right forearm erythema around the fistula. No warmth and fluctuance.   Neurological: Pt is alert and oriented to person, place, and time. Normal reflexes.   Skin: Skin is warm and dry. No rash noted.   Psychiatric: Pt has a normal mood and affect. Behavior is normal. Judgment and thought content normal.     Kim Anne is a 74 year old female with a past medical history significant for s/p kidney donor (donated to her brother in 1988), CKD stage IV (On HD Tu/Th/Sa), COPD, MI, HTN, DM2, HLD, diverticulosis and hypothyroidism who presents here to the Emergency  Department due to shortness of breath and left sided chest pain.      1. Chest Pain,  2. CKD stage IV (On HD Tu/Th/Sa),   Shortness of breath: Patient presented for chest pain and shortness of breath that has been ongoing for the last few days. She reports having them after dialysis yesterday. The chest pain was central. She denied any shortness of breath today. Denies any chest pain. EKG is unremarkable and was repeated in the ED with recurrence of chest pain- it remained unremarkable for acute process. Initial Troponin mildly elevated in setting of CKD on hemodialysis, 0.051. decreased.  BNP is elevated at 66,000. Chest xray has mild effusion, possible fluid overload/CHF. No peripheral swelling or edema.  Discussed with cardiology who recommended echo and a lexiscan. Echo showed Global and regional left ventricular function is normal with an EF of 60-65%. No regional wall motion abnormalities are seen. Mild pulmonary hypertension. No change since 11/23/19. Renal consult for dialysis and patient underwent ultrafiltration yesterday and will dialyze today.  Per nephrology, 3L were taken off to bring patient down to dry weight yesterday. Patient reports no change in her shortness of breath. Patient underwent a Lexiscan due to her chest pain which was normal. She also had HD run yesterday. During that, Dr. Laurent from nephrology noticed erythema around the fistula and was concerned about infection.  Recommended starting Ancef and consulting Dr. Allen tomorrow am. She did receive a dose of Ancef yesterday. Consulted Dr. Lamb at transplant surgery who recommended Augmenting x 1 week and US of the fistula to further evaluate function.  US, no sonographic evidence for infection associated with right upper extremity fistula. Fistula anastomosis evaluation: Continued anastomotic narrowing, mildly improved since 11/25/2019. Patient is stable, afebrile, stable vital signs, no leukocytosis.-Augmentin x 1 week  -HD  tomorrow per routine   -repeat CBC, BMP in am     2. Dispo Planning: Pt reports that she hasn't eaten much because there is no food in her house. She lives with son and grandson and states that they will only give her food if she spends her money to buy it. They have been heating the house with her stove. She has not been eating because there is no one to cook. She was seen by SW/CC in her home last week and it was recommended she go to a TCU. The patient declined. Yesterday, the patient expressed interest to go to a TCU.  Per SW, pt discharging to TCU tomorrow at 3:30 tomorrow given no changes in medical status.    -SW to follow pt        Chronic Medical Problems  #HTN:   #HLD: continue PTA lipitor  #COPD: continue PTA Advair, PTA Spiriva  #CAD, s/p MI (2006): continue PTA ASA 81mg  #DM2: LSSI ordered, glucose checks Q4hrs while NPO  #Diverticulosis  #Hypothyroid: continue PTA synthroid   #Chronic Pain: continue PTA Oxycodone     Consults: Nephrology, surgery   FEN: Regular   DVT prophylaxis: Early ambulation  Code Status: Full  Disposition: Stable vital signs. Patient return to baseline.  All labs/images reviewed      Signed:  Dilma Razo PA-C  December 13, 2019 at 3:26 PM

## 2019-12-13 NOTE — PLAN OF CARE
- Serial troponins and stress test complete: Yes  - Seen and cleared by consultant if applicable: No  - Adequate pain control on oral analgesia: Yes  - Vital signs normal or at patient baseline: Yes  - Safe disposition plan has been identified: No, awaiting TCU placement

## 2019-12-13 NOTE — PLAN OF CARE
Observation goals PRIOR TO DISCHARGE     Comments: List all goals to be met before discharge home:   - Serial troponins and stress test complete. YES: cleared from cardiac standpoint  - Seen and cleared by consultant if applicable YES  - Adequate pain control on oral analgesia YES  - Vital signs normal or at patient baseline /57 (BP Location: Left arm)   Pulse 59   Temp 98.7  F (37.1  C) (Oral)   Resp 16   Wt 58.8 kg (129 lb 10.1 oz)   SpO2 95%   BMI 21.57 kg/m      - Safe disposition plan has been identified YES: discharge to TCU AT 1545  - Nurse to notify provider when observation goals have been met and patient is ready for discharge.

## 2019-12-13 NOTE — PROGRESS NOTES
HEMODIALYSIS TREATMENT NOTE    Date: 12/12/2019  Time: 6:38 PM    Data:  Pre Wt: 58.8 kg   Desired Wt: 57.8 kg   Post Wt:   Weight change:   Ultrafiltration - Post Run Net Total Removed (mL):   Vascular Access Status: CVC (tunneled RIJ)  Dialyzer Rinse: Moderate  Total Blood Volume Processed:    Total Dialysis (Treatment) Time: 3 hours  Dialysate Bath: K 3, Ca 3, Na 138, Bicarb 32  Heparin: 4,000 unit loading dose    Lab:   No    Interventions:  300 BFR. Lines reversed 2/2 frequent arterial alarms. No alarming after reversal. UF goal increased mid-run. 1.2 L of fluid removed without complications. PO Benadryl given for c/o itching. CVC Heparin locked post-treatment and ClearGuard caps placed. Report given to primary RN on 6D.    Assessment:  ESRD patient in for SOB and chest pain. Alert and oriented. Able to make needs known. VSS throughout on room air. Frequent productive sounding cough. Ordered dinner to take back to 6D.     Plan:    Per renal team.

## 2019-12-13 NOTE — PROGRESS NOTES
Social Work Services Progress Note    Hospital Day: 4  Date of Initial Social Work Evaluation:  11/25/19  Collaborated with:  JILLIAN, [facility] liaison [place], and pt    Data:  SW following 74 year old female pt for discharge planning.     Intervention:  JILLIAN updated SW that pt is not ready for discharge today, will be ready tomorrow.   SW updated Nydia Stafford stated facility is able to accept tomorrow.   VELASQUEZ called [facility] [phone] to cancel ride for today. Ride has been rescheduled for 12/14/19 at 330PM.   VELASQUEZ updated pt and JILLIAN.     SW will need to fax discharge orders to [phone] tomorrow.     Assessment:  Pt agreeable to discharge tomorrow.     Plan:    Anticipated Disposition:  Facility:  [facility] [address] [place] [address]    Barriers to d/c plan:  NA    Follow Up:  SW to fax discharge orders.     ANA Caldwell, LGSW  [facility]   Ph:[phone]  Pager [phone]  Unit [phone]

## 2019-12-13 NOTE — PLAN OF CARE
Observation goals PRIOR TO DISCHARGE     Comments: List all goals to be met before discharge home:   - Serial troponins and stress test complete. YES: cleared from cardiac standpoint  - Seen and cleared by consultant if applicable YES  - Adequate pain control on oral analgesia YES  - Vital signs normal or at patient baseline /59 (BP Location: Left arm)   Pulse 59   Temp 98.3  F (36.8  C) (Oral)   Resp 16   Wt 58.8 kg (129 lb 10.1 oz)   SpO2 96%   BMI 21.57 kg/m      - Safe disposition plan has been identified YES: discharge planned tomorrow at 1530  - Nurse to notify provider when observation goals have been met and patient is ready for discharge.

## 2019-12-13 NOTE — PLAN OF CARE
Observation goals PRIOR TO DISCHARGE     Comments: List all goals to be met before discharge home:   - Serial troponins and stress test complete. YES: cleared from cardiac standpoint  - Seen and cleared by consultant if applicable YES  - Adequate pain control on oral analgesia YES  - Vital signs normal or at patient baseline BP 95/55 (BP Location: Left arm)   Pulse 56   Temp 98.3  F (36.8  C) (Oral)   Resp 16   Wt 58.8 kg (129 lb 10.1 oz)   SpO2 94%   BMI 21.57 kg/m      - Safe disposition plan has been identified PENDING: discharge pending TCU placement  - Nurse to notify provider when observation goals have been met and patient is ready for discharge.

## 2019-12-13 NOTE — PROGRESS NOTES
Nephrology  Progress note- dialysis visit  12/12/2019     This patient was seen and examined while on dialysis.  Professional oversight of the patient's dialysis care, access care and dialysis related co-morbidities were addressed as necessary with the patient and / or staff.   Right arm AV fistula appears possibly infected.      Laboratory results and nurses' notes were reviewed.   No changes to management of volume, anemia, BMD, acidosis, or electrolytes.   Diagnosis - ESRD  Possible cellulitis right AV fistula.  No fluctuance appreciated.  May need ultrasound.  No h/o MRSA so 1st generation cephalosporin may be adequate treatment.  Recommend consult with Dr. Allen and surgical fellow Dr. Massimo Lamb who placed fistula on 11/22/19.      Michelle Laurent MD  [facility]  Department of Medicine  Division of Renal Disease and Hypertension  [phone]

## 2019-12-14 VITALS
HEART RATE: 51 BPM | OXYGEN SATURATION: 96 % | RESPIRATION RATE: 16 BRPM | BODY MASS INDEX: 20.84 KG/M2 | WEIGHT: 125.22 LBS | TEMPERATURE: 97.8 F | DIASTOLIC BLOOD PRESSURE: 49 MMHG | SYSTOLIC BLOOD PRESSURE: 100 MMHG

## 2019-12-14 LAB
GLUCOSE BLDC GLUCOMTR-MCNC: 116 MG/DL (ref 70–99)
GLUCOSE BLDC GLUCOMTR-MCNC: 116 MG/DL (ref 70–99)

## 2019-12-14 PROCEDURE — 25000132 ZZH RX MED GY IP 250 OP 250 PS 637: Performed by: PHYSICIAN ASSISTANT

## 2019-12-14 PROCEDURE — 25000128 H RX IP 250 OP 636: Performed by: INTERNAL MEDICINE

## 2019-12-14 PROCEDURE — 25800030 ZZH RX IP 258 OP 636: Performed by: INTERNAL MEDICINE

## 2019-12-14 PROCEDURE — 25000132 ZZH RX MED GY IP 250 OP 250 PS 637: Performed by: NURSE PRACTITIONER

## 2019-12-14 PROCEDURE — 90937 HEMODIALYSIS REPEATED EVAL: CPT

## 2019-12-14 PROCEDURE — 99217 ZZC OBSERVATION CARE DISCHARGE: CPT | Mod: Z6 | Performed by: FAMILY MEDICINE

## 2019-12-14 PROCEDURE — G0378 HOSPITAL OBSERVATION PER HR: HCPCS

## 2019-12-14 PROCEDURE — G0257 UNSCHED DIALYSIS ESRD PT HOS: HCPCS

## 2019-12-14 PROCEDURE — 00000146 ZZHCL STATISTIC GLUCOSE BY METER IP

## 2019-12-14 RX ORDER — HEPARIN SODIUM 1000 [USP'U]/ML
4000 INJECTION, SOLUTION INTRAVENOUS; SUBCUTANEOUS
Status: COMPLETED | OUTPATIENT
Start: 2019-12-14 | End: 2019-12-14

## 2019-12-14 RX ORDER — AMOXICILLIN AND CLAVULANATE POTASSIUM 500; 125 MG/1; MG/1
1 TABLET, FILM COATED ORAL EVERY 24 HOURS
Status: ON HOLD | DISCHARGE
Start: 2019-12-15 | End: 2020-02-01

## 2019-12-14 RX ORDER — OXYCODONE HYDROCHLORIDE 5 MG/1
5 TABLET ORAL EVERY 8 HOURS PRN
Qty: 90 TABLET | Refills: 0 | Status: SHIPPED | DISCHARGE
Start: 2019-12-14 | End: 2020-01-06

## 2019-12-14 RX ADMIN — DOCUSATE SODIUM 200 MG: 100 CAPSULE, LIQUID FILLED ORAL at 08:17

## 2019-12-14 RX ADMIN — FLUTICASONE FUROATE AND VILANTEROL TRIFENATATE 1 PUFF: 200; 25 POWDER RESPIRATORY (INHALATION) at 08:18

## 2019-12-14 RX ADMIN — LEVOTHYROXINE SODIUM 200 MCG: 50 TABLET ORAL at 08:17

## 2019-12-14 RX ADMIN — HYDROXYZINE HYDROCHLORIDE 25 MG: 25 TABLET, FILM COATED ORAL at 02:55

## 2019-12-14 RX ADMIN — HEPARIN SODIUM 4000 UNITS: 1000 INJECTION INTRAVENOUS; SUBCUTANEOUS at 10:12

## 2019-12-14 RX ADMIN — AMOXICILLIN AND CLAVULANATE POTASSIUM 1 TABLET: 500; 125 TABLET, FILM COATED ORAL at 08:17

## 2019-12-14 RX ADMIN — Medication 1 CAPSULE: at 08:17

## 2019-12-14 RX ADMIN — HEPARIN SODIUM 3000 UNITS: 1000 INJECTION INTRAVENOUS; SUBCUTANEOUS at 13:16

## 2019-12-14 RX ADMIN — OXYCODONE HYDROCHLORIDE 5 MG: 5 TABLET ORAL at 14:37

## 2019-12-14 RX ADMIN — Medication 1 MG: at 02:55

## 2019-12-14 RX ADMIN — SODIUM CHLORIDE 250 ML: 9 INJECTION, SOLUTION INTRAVENOUS at 10:14

## 2019-12-14 RX ADMIN — SODIUM CHLORIDE 300 ML: 9 INJECTION, SOLUTION INTRAVENOUS at 10:14

## 2019-12-14 RX ADMIN — UMECLIDINIUM 1 PUFF: 62.5 AEROSOL, POWDER ORAL at 08:18

## 2019-12-14 RX ADMIN — ACETAMINOPHEN 650 MG: 325 TABLET, FILM COATED ORAL at 02:55

## 2019-12-14 NOTE — PROGRESS NOTES
HEMODIALYSIS TREATMENT NOTE    Date: 12/14/2019  Time: 1:24 PM    Data:  Pre Wt: 58.8 kg (129 lb 10.1 oz)   Desired Wt: 56.8 kg   Post Wt: 56.8 kg (125 lb 3.5 oz)  Weight change: 2 kg  Ultrafiltration - Post Run Net Total Removed (mL): 2000 mL  Vascular Access Status: patent  Dialyzer Rinse: Clear  Total Blood Volume Processed: 55.41 L Liters  Total Dialysis (Treatment) Time: 3 Hours    Lab:   No    Assessment/Interventions:  Patient dialyzed 3hrs via CVC with BFR of 350ml/min. Net fluid removal 2kg. Patient tolerated HD run well.no c/o discomfort. Post dialysis standing weight 56.8kg. Patient refused his pre dialysis weight. CVC site dressing CDi. Post dialysis verbal report given to floor RN.      Plan:    Next HD run per renal team.

## 2019-12-14 NOTE — PROGRESS NOTES
Observation goals PRIOR TO DISCHARGE     Comments: List all goals to be met before discharge home:   - Serial troponins and stress test complete. YES: cleared from cardiac standpoint    - Seen and cleared by consultant if applicable YES    - Adequate pain control on oral analgesia YES, oxy administered x 1 and Tylenol have been effective in alleviating pain    - Vital signs normal or at patient baseline Yes,BP 94/51 (BP Location: Left arm)   Pulse 54   Temp 98.3  F (36.8  C) (Oral)   Resp 16   Wt 58.8 kg (129 lb 10.1 oz)   SpO2 95% RA   BMI 21.57 kg/m      - Safe disposition plan has been identified YES:  Will have dialysis this morning and will discharge to [facility]. Has ride scheduled for 3:30pm.  Right fistula site red and area marked. No increase from previous marked are. Denies pain and tenderness to site. Bruit and thrill noted.     - Nurse to notify provider when observation goals have been met and patient is ready for discharge.

## 2019-12-14 NOTE — PROGRESS NOTES
Nephrology  Progress note- dialysis visit  12/14/2019     This patient was seen and examined while on dialysis.  Professional oversight of the patient's dialysis care, access care and dialysis related co-morbidities were addressed as necessary with the patient and / or staff.   No issues during run    Laboratory results and nurses' notes were reviewed.   No changes to management of volume, anemia, BMD, acidosis, or electrolytes.   Diagnosis - ESRD      Michelle Laurent MD  Physicians  [facility]  Department of Medicine  Division of Renal Disease and Hypertension  [phone]

## 2019-12-14 NOTE — PLAN OF CARE
List all goals to be met before discharge home:   - Serial troponins and stress test complete-yes  - Seen and cleared by consultant if applicable -yes  - Adequate pain control on oral analgesia-yes   - Vital signs normal or at patient baseline -yes  - Safe disposition plan has been identified-YES:  Will have dialysis this morning and will discharge to [facility]. Has ride scheduled for 1700  Right fistula site red and area marked. No increase from previous marked are. Denies pain and tenderness to site. Bruit and thrill noted.   - Nurse to notify provider when observation goals have been met and patient is ready for discharge.

## 2019-12-14 NOTE — DISCHARGE SUMMARY
Discharge Summary    Kim Anne MRN# [id number]   YOB: 1945 Age: 74 year old     Date of Admission:  12/10/2019  Date of Discharge:  12/14/2019  Admitting Physician:  Cameron Herr MD  Discharge Physician:  Serge Nelson MD   Discharging Service:  Emergency Medicine     Primary Provider: Ailyn Nieves          Discharge Diagnosis:   ESRD on HD  AV fistula infection               Discharge Disposition:   Discharged to TCU           Condition on Discharge:   Discharge condition: Stable   Code status on discharge: Full Code           Procedures:   Invasive procedures: HD          Discharge Medications:     Current Discharge Medication List      START taking these medications    Details   amoxicillin-clavulanate (AUGMENTIN) 500-125 MG tablet Take 1 tablet by mouth every 24 hours for 6 days    Associated Diagnoses: Infection of arteriovenous fistula, initial encounter (H)         CONTINUE these medications which have CHANGED    Details   oxyCODONE (ROXICODONE) 5 MG tablet Take 1 tablet (5 mg) by mouth every 8 hours as needed for moderate to severe pain  Qty: 90 tablet, Refills: 0    Associated Diagnoses: Chronic low back pain without sciatica, unspecified back pain laterality         CONTINUE these medications which have NOT CHANGED    Details   albuterol (PROAIR HFA/PROVENTIL HFA/VENTOLIN HFA) 108 (90 Base) MCG/ACT inhaler Inhale 2 puffs into the lungs every 6 hours as needed for shortness of breath / dyspnea or wheezing  Qty: 18 g, Refills: 3    Associated Diagnoses: Chronic obstructive pulmonary disease, unspecified COPD type (H)      fluticasone-salmeterol (ADVAIR) 250-50 MCG/DOSE inhaler Inhale 1 puff into the lungs every 12 hours  Qty: 1 Inhaler, Refills: 5    Associated Diagnoses: COPD exacerbation (H)      albuterol (PROVENTIL) (2.5 MG/3ML) 0.083% neb solution Take 1 vial (2.5 mg) by nebulization every 6 hours as needed for shortness of breath / dyspnea or wheezing  Qty: 180 mL,  Refills: 3    Associated Diagnoses: COPD exacerbation (H)      ammonium lactate (LAC-HYDRIN) 12 % external lotion Apply topically 2 times daily  Qty: 225 g, Refills: 0    Associated Diagnoses: Dry skin      ASPIR-LOW 81 MG EC tablet Take 1 tablet (81 mg) by mouth daily  Qty: 90 tablet, Refills: 3    Associated Diagnoses: History of MI (myocardial infarction); Essential hypertension, benign      atorvastatin (LIPITOR) 40 MG tablet Take 1 tablet (40 mg) by mouth daily  Qty: 90 tablet, Refills: 1    Associated Diagnoses: Hyperlipidemia LDL goal <100      blood glucose monitoring (FREESTYLE LITE) test strip TEST BLOOD SUGAR TWO TIMES A DAY  Qty: 50 strip, Refills: 12    Associated Diagnoses: Type 2 diabetes mellitus without complication, with long-term current use of insulin (H)      blood glucose monitoring (FREESTYLE) lancets Test BS two times daily as directed  Qty: 100 each, Refills: 1    Associated Diagnoses: Type 2 diabetes mellitus without complication, with long-term current use of insulin (H)      docusate sodium (COLACE) 100 MG capsule Take 200 mg by mouth daily Pt last took 11/19/19  Qty: 60 capsule, Refills: 4    Associated Diagnoses: Constipation, unspecified constipation type      Emollient (EUCERIN CALMING DAILY MOIST) CREA Externally apply 1 dose. topically daily  Qty: 1 Tube, Refills: 12    Associated Diagnoses: Type II or unspecified type diabetes mellitus with neurological manifestations, not stated as uncontrolled(250.60) (H)      levothyroxine (SYNTHROID/LEVOTHROID) 200 MCG tablet Take 1 tablet (200 mcg) by mouth daily  Qty: 90 tablet, Refills: 1    Associated Diagnoses: Other specified hypothyroidism      multivitamin RENAL (NEPHROCAPS/TRIPHROCAPS) 1 MG capsule Take 1 capsule by mouth daily Pt doesn't recall last dose 11/20/19      nitroGLYcerin (NITROSTAT) 0.4 MG sublingual tablet Place 1 tablet (0.4 mg) under the tongue every 5 minutes as needed for chest pain  Qty: 25 tablet, Refills: 0     Associated Diagnoses: History of MI (myocardial infarction)      nystatin (MYCOSTATIN) 699079 UNIT/GM POWD Apply 1 g topically 3 times daily as needed  Qty: 30 g, Refills: 1    Associated Diagnoses: Groin rash      !! order for DME Equipment being ordered: Nebulizer  Qty: 1 Device, Refills: 0    Associated Diagnoses: Chronic obstructive pulmonary disease, unspecified COPD type (H)      !! order for DME Equipment being ordered: Boost.  Qty: 6 Can, Refills: 3    Associated Diagnoses: CKD (chronic kidney disease) stage 5, GFR less than 15 ml/min (H)      !! order for DME Equipment being ordered: cane  Qty: 1 each, Refills: 0    Associated Diagnoses: Non-compliant patient      !! order for DME Equipment being ordered: Diabetic Shoes  Qty: 1 each, Refills: 0    Associated Diagnoses: Type 2 diabetes mellitus with stage 5 chronic kidney disease not on chronic dialysis, without long-term current use of insulin (H)      !! order for DME Equipment being ordered: two pairs moderate knee high support hose  Qty: 2 Device, Refills: 1    Associated Diagnoses: Edema, unspecified type      !! order for DME Equipment being ordered: Compression socks.  Strength:15-20 mmHg  Qty: 2 each, Refills: 0    Associated Diagnoses: Localized edema      !! order for DME One wheeled walker with seat and brakes and basket  Qty: 1 Device, Refills: 0    Associated Diagnoses: Risk for falls      polyethylene glycol (MIRALAX/GLYCOLAX) packet Take 17 g by mouth daily as needed for constipation  Qty: 10 packet, Refills: 0    Associated Diagnoses: Slow transit constipation      tiotropium (SPIRIVA) 18 MCG inhaled capsule Inhale 1 capsule (18 mcg) into the lungs daily  Qty: 30 capsule, Refills: 11    Associated Diagnoses: Chronic bronchitis, unspecified chronic bronchitis type (H); Pulmonary emphysema, unspecified emphysema type (H)      vitamin D3 (CHOLECALCIFEROL) 2000 units (50 mcg) tablet Take 1 tablet (2,000 Units) by mouth daily  Qty: 90 tablet,  Refills: 3    Associated Diagnoses: Vitamin D deficiency disease       !! - Potential duplicate medications found. Please discuss with provider.                Consultations:   Consultation during this admission received from nephrology and surgery             Brief History of Illness:   Please see detailed H&P from 12/10/19, in brief: Kim Anne is a 74 year old female with a past medical history significant for s/p kidney donor (donated to her brother in 1988), CKD stage IV (On HD Tu/Th/Sa), COPD, MI, HTN, DM2, HLD, diverticulosis and hypothyroidism who presents here to the Emergency Department due to shortness of breath and left sided chest pain.              Hospital Course:   1. Chest Pain,  2. CKD stage IV (On HD Tu/Th/Sa),   Shortness of breath: Patient presented for chest pain and shortness of breath that has been ongoing for the last few days. . EKG is unremarkable and was repeated in the ED with recurrence of chest pain- it remained unremarkable for acute process. Initial Troponin mildly elevated in setting of CKD on hemodialysis, 0.051. decreased.  BNP is elevated at 66,000. Chest xray has mild effusion, possible fluid overload/CHF. No peripheral swelling or edema.  Discussed with cardiology who recommended echo and a lexiscan. Echo showed Global and regional left ventricular function is normal with an EF of 60-65%. No regional wall motion abnormalities are seen. Mild pulmonary hypertension. No change since 11/23/19. Renal consult for dialysis and patient underwent ultrafiltration once during this admission and HD twice (as scheduled T, TH, Sat).  Patient underwent a Lexiscan due to her chest pain which was normal.  During HD on 12/12 Dr. Laurent from nephrology noticed erythema around the fistula and was concerned about infection.  She was given one dose of IV Ancef. Consulted Dr. Lamb at transplant surgery who recommended Augmentin x 1 week and US of the fistula to further evaluate  function.  US, no sonographic evidence for infection associated with right upper extremity fistula. Fistula anastomosis evaluation: Continued anastomotic narrowing, mildly improved since 11/25/2019. Patient is stable, afebrile, stable vital signs, no leukocytosis.  She underwent HD today and was discharged to TCU afterwards.  -Augmentin x 1 week  -continue HD TID       2. Dispo Planning: Pt reports that she hasn't eaten much because there is no food in her house. She lives with son and grandson and states that they will only give her food if she spends her money to buy it. They have been heating the house with her stove. She has not been eating because there is no one to cook. She was seen by SW/CC in her home last week and it was recommended she go to a TCU. The patient declined. Yesterday, the patient expressed interest to go to a TCU.  Patient discharging to TCU at 5pm today.         Chronic Medical Problems  #HTN: continue to monitor  #HLD: continue PTA lipitor  #COPD: continue PTA Advair, PTA Spiriva  #CAD, s/p MI (2006): continue PTA ASA 81mg  #DM2: Recent A1C was 5.7.  Will have TCU check blood sugars once in am, not on medications currently.  #Hypothyroid: continue PTA synthroid   #Chronic Pain: continue PTA Oxycodone            Final Day of Progress before Discharge:       Physical Exam:  Blood pressure 111/58, pulse 52, temperature 98.5  F (36.9  C), temperature source Oral, resp. rate 14, weight 58.8 kg (129 lb 10.1 oz), SpO2 97 %, not currently breastfeeding.    EXAM:  Exam:  Constitutional: healthy, alert and no distress  Cardiovascular: No lifts, heaves, or thrills. RRR. No murmurs, clicks gallops or rub  Respiratory: Good diaphragmatic excursion. Lungs clear  Gastrointestinal: Abdomen soft, non-tender. BS normal. No masses, organomegaly  Musculoskeletal: extremities normal- no gross deformities noted,  and normal muscle tone  Skin: no suspicious lesions or rashes, right AV fistula (+thrill, +bruit)  with slight erythema-not extending beyond markings, no fluctuance or drainage. Right internal jugular CVC in place with no signs of infection.   Neurologic:  Alert and oriented.   Psychiatric: mentation appears normal and affect normal/bright    /58   Pulse 52   Temp 98.5  F (36.9  C) (Oral)   Resp 14   Wt 58.8 kg (129 lb 10.1 oz)   SpO2 97%   BMI 21.57 kg/m               Data:  All laboratory data reviewed             Significant Results:     Results for orders placed or performed during the hospital encounter of 12/10/19   CBC with platelets differential     Status: Abnormal   Result Value Ref Range    WBC 7.7 4.0 - 11.0 10e9/L    RBC Count 3.23 (L) 3.8 - 5.2 10e12/L    Hemoglobin 10.2 (L) 11.7 - 15.7 g/dL    Hematocrit 32.6 (L) 35.0 - 47.0 %     (H) 78 - 100 fl    MCH 31.6 26.5 - 33.0 pg    MCHC 31.3 (L) 31.5 - 36.5 g/dL    RDW 17.6 (H) 10.0 - 15.0 %    Platelet Count 352 150 - 450 10e9/L    Diff Method Automated Method     % Neutrophils 78.3 %    % Lymphocytes 14.9 %    % Monocytes 3.7 %    % Eosinophils 1.8 %    % Basophils 0.9 %    % Immature Granulocytes 0.4 %    Nucleated RBCs 0 0 /100    Absolute Neutrophil 6.0 1.6 - 8.3 10e9/L    Absolute Lymphocytes 1.1 0.8 - 5.3 10e9/L    Absolute Monocytes 0.3 0.0 - 1.3 10e9/L    Absolute Eosinophils 0.1 0.0 - 0.7 10e9/L    Absolute Basophils 0.1 0.0 - 0.2 10e9/L    Abs Immature Granulocytes 0.0 0 - 0.4 10e9/L    Absolute Nucleated RBC 0.0    Basic metabolic panel     Status: Abnormal   Result Value Ref Range    Sodium 137 133 - 144 mmol/L    Potassium 3.7 3.4 - 5.3 mmol/L    Chloride 101 94 - 109 mmol/L    Carbon Dioxide 33 (H) 20 - 32 mmol/L    Anion Gap 3 3 - 14 mmol/L    Glucose 131 (H) 70 - 99 mg/dL    Urea Nitrogen 14 7 - 30 mg/dL    Creatinine 2.66 (H) 0.52 - 1.04 mg/dL    GFR Estimate 17 (L) >60 mL/min/[1.73_m2]    GFR Estimate If Black 20 (L) >60 mL/min/[1.73_m2]    Calcium 8.1 (L) 8.5 - 10.1 mg/dL   Troponin I     Status: Abnormal   Result  Value Ref Range    Troponin I ES 0.051 (H) 0.000 - 0.045 ug/L   Nt probnp inpatient (BNP)     Status: Abnormal   Result Value Ref Range    N-Terminal Pro BNP Inpatient 60,909 (H) 0 - 900 pg/mL   Troponin I - Now then in 4 hours x 2     Status: None   Result Value Ref Range    Troponin I ES 0.032 0.000 - 0.045 ug/L   Troponin I - Now then in 4 hours x 2     Status: None   Result Value Ref Range    Troponin I ES 0.039 0.000 - 0.045 ug/L   Hemoglobin A1c     Status: Abnormal   Result Value Ref Range    Hemoglobin A1C 5.8 (H) 0 - 5.6 %   Glucose by meter     Status: None   Result Value Ref Range    Glucose 72 70 - 99 mg/dL   Basic metabolic panel     Status: Abnormal   Result Value Ref Range    Sodium 139 133 - 144 mmol/L    Potassium 4.3 3.4 - 5.3 mmol/L    Chloride 103 94 - 109 mmol/L    Carbon Dioxide 30 20 - 32 mmol/L    Anion Gap 5 3 - 14 mmol/L    Glucose 89 70 - 99 mg/dL    Urea Nitrogen 24 7 - 30 mg/dL    Creatinine 4.00 (H) 0.52 - 1.04 mg/dL    GFR Estimate 10 (L) >60 mL/min/[1.73_m2]    GFR Estimate If Black 12 (L) >60 mL/min/[1.73_m2]    Calcium 8.2 (L) 8.5 - 10.1 mg/dL   CBC with platelets     Status: Abnormal   Result Value Ref Range    WBC 5.6 4.0 - 11.0 10e9/L    RBC Count 2.93 (L) 3.8 - 5.2 10e12/L    Hemoglobin 9.3 (L) 11.7 - 15.7 g/dL    Hematocrit 30.0 (L) 35.0 - 47.0 %     (H) 78 - 100 fl    MCH 31.7 26.5 - 33.0 pg    MCHC 31.0 (L) 31.5 - 36.5 g/dL    RDW 18.4 (H) 10.0 - 15.0 %    Platelet Count 323 150 - 450 10e9/L   Glucose by meter     Status: Abnormal   Result Value Ref Range    Glucose 179 (H) 70 - 99 mg/dL   Glucose by meter     Status: None   Result Value Ref Range    Glucose 73 70 - 99 mg/dL   Glucose by meter     Status: Abnormal   Result Value Ref Range    Glucose 137 (H) 70 - 99 mg/dL   Glucose by meter     Status: Abnormal   Result Value Ref Range    Glucose 110 (H) 70 - 99 mg/dL   Glucose by meter     Status: Abnormal   Result Value Ref Range    Glucose 122 (H) 70 - 99 mg/dL    Glucose by meter     Status: Abnormal   Result Value Ref Range    Glucose 114 (H) 70 - 99 mg/dL   Glucose by meter     Status: Abnormal   Result Value Ref Range    Glucose 150 (H) 70 - 99 mg/dL   Glucose by meter     Status: None   Result Value Ref Range    Glucose 91 70 - 99 mg/dL   Glucose by meter     Status: Abnormal   Result Value Ref Range    Glucose 272 (H) 70 - 99 mg/dL   Hemoglobin A1c     Status: Abnormal   Result Value Ref Range    Hemoglobin A1C 5.7 (H) 0 - 5.6 %   CBC with platelets differential     Status: Abnormal   Result Value Ref Range    WBC 4.8 4.0 - 11.0 10e9/L    RBC Count 3.22 (L) 3.8 - 5.2 10e12/L    Hemoglobin 10.2 (L) 11.7 - 15.7 g/dL    Hematocrit 33.3 (L) 35.0 - 47.0 %     (H) 78 - 100 fl    MCH 31.7 26.5 - 33.0 pg    MCHC 30.6 (L) 31.5 - 36.5 g/dL    RDW 17.6 (H) 10.0 - 15.0 %    Platelet Count 319 150 - 450 10e9/L    Diff Method Automated Method     % Neutrophils 53.2 %    % Lymphocytes 28.0 %    % Monocytes 7.7 %    % Eosinophils 8.8 %    % Basophils 1.7 %    % Immature Granulocytes 0.6 %    Nucleated RBCs 0 0 /100    Absolute Neutrophil 2.6 1.6 - 8.3 10e9/L    Absolute Lymphocytes 1.3 0.8 - 5.3 10e9/L    Absolute Monocytes 0.4 0.0 - 1.3 10e9/L    Absolute Eosinophils 0.4 0.0 - 0.7 10e9/L    Absolute Basophils 0.1 0.0 - 0.2 10e9/L    Abs Immature Granulocytes 0.0 0 - 0.4 10e9/L    Absolute Nucleated RBC 0.0    Basic metabolic panel     Status: Abnormal   Result Value Ref Range    Sodium 134 133 - 144 mmol/L    Potassium 4.3 3.4 - 5.3 mmol/L    Chloride 102 94 - 109 mmol/L    Carbon Dioxide 28 20 - 32 mmol/L    Anion Gap 5 3 - 14 mmol/L    Glucose 78 70 - 99 mg/dL    Urea Nitrogen 18 7 - 30 mg/dL    Creatinine 3.60 (H) 0.52 - 1.04 mg/dL    GFR Estimate 12 (L) >60 mL/min/[1.73_m2]    GFR Estimate If Black 14 (L) >60 mL/min/[1.73_m2]    Calcium 8.4 (L) 8.5 - 10.1 mg/dL   Glucose by meter     Status: None   Result Value Ref Range    Glucose 94 70 - 99 mg/dL   Glucose by meter      Status: Abnormal   Result Value Ref Range    Glucose 213 (H) 70 - 99 mg/dL   Glucose by meter     Status: Abnormal   Result Value Ref Range    Glucose 150 (H) 70 - 99 mg/dL   Glucose by meter     Status: Abnormal   Result Value Ref Range    Glucose 104 (H) 70 - 99 mg/dL   Glucose by meter     Status: Abnormal   Result Value Ref Range    Glucose 149 (H) 70 - 99 mg/dL   Glucose by meter     Status: Abnormal   Result Value Ref Range    Glucose 116 (H) 70 - 99 mg/dL   EKG 12-lead, tracing only     Status: None   Result Value Ref Range    Interpretation ECG Click View Image link to view waveform and result    EKG 12 lead     Status: None   Result Value Ref Range    Interpretation ECG Click View Image link to view waveform and result    NM Lexiscan stress test (nuc card)     Status: None   Result Value Ref Range    Target      Baseline Systolic      Baseline Diastolic BP 65     Last Stress Systolic      Last Stress Diastolic BP 61     Baseline HR 63     Max HR 74     Calculated Percent HR 51 %    Rate Pressure Product 7,844.0     Left Ventricular EF 79 %      Recent Results (from the past 48 hour(s))   US Ext Arterial Venous Dialys Acs Graft    Narrative    Exam: Duplex ultrasound of the right upper extremity dialysis fistula  dated 12/13/2019 2:27 PM    Comparison examination: 11/25/2019    Clinical history: Right fistula erythema concerning for infection    Ordering provider: Dr. Razo    Technique: B-mode (grayscale) and duplex Doppler ultrasound of the  fistula including the arterial inflow through the venous outflow,  including any incidental findings. Velocity measurements obtained with  angle of insonation at or below 60 degrees. Flow volumes obtained in a  segment of the venous outflow.    Findings:  Sonographic evaluation of the soft tissues deep to the area of  erythema demarcated by marker, there is no significant subcutaneous  edema or fluid collection to suggest abscess.    Right upper  extremity. Brachiocephalic fistula.    Brachial artery upper arm: 354/66 cm/sec  Brachial artery mid arm: 355/135 cm/sec  Brachial artery at antecubital fossa, above anastomosis: 355/100  cm/sec  Brachial artery distal to the fistula anastomosis: 101 cm/sec, with  flow directed toward the wrist    Ulnar artery: 68 cm/sec    Radial artery proximal forearm: 75 cm/sec    Fistula anastomosis: 802/308 cm/sec, 2.7 mm diameter     Diameter of the fistula anastomosis: 2.7 mm    Venous evaluation:  Venous outflow:  Cephalic vein distal upper arm: 177/93 cm/sec, 6.9 mm diameter  Cephalic vein mid upper arm: 155/73 cm/sec  Cephalic vein proximal upper arm: 180/45 cm/sec  Cephalic vein at the shoulder: 265/189 cm/s  [place] of the cephalic and subclavian vein: 477/123 cm/s    Flow volume measurement obtained at the outflow vein above the  antecubital fossa, is 1330 mL/min.    Axillary vein: 40 cm/sec  Subclavian vein: 182 cm/sec  Innominate vein: 60 cm/sec      Impression    Impression:  1. No sonographic evidence for infection associated with right upper  extremity fistula.    2. Fistula anastomosis evaluation: Continued perianastomotic venous  narrowing. Diameter measures 2.7 mm, previously 1.7 mm. Peak systolic  velocity at the anastomosis is 802 cm/s, previously 851 cm/s.    3. Venous outflow: Continued cephalic arch narrowing with tripling of  peak systolic velocity within the cephalic vein from the mid arm to  the arch.    I have personally reviewed the examination and initial interpretation  and I agree with the findings.    LISSETH DANGELO                Pending Results:   Unresulted Labs Ordered in the Past 30 Days of this Admission     Date and Time Order Name Status Description    12/6/2019 0910 T4 FREE In process     11/23/2019 1003 T4 free In process                   Discharge Instructions and Follow-Up:     Discharge Procedure Orders   Home care nursing referral   Referral Priority: Routine Referral Type:  Home Health Therapies & Aides   Number of Visits Requested: 1     Home Care PT Referral for Hospital Discharge   Referral Priority: Routine Referral Type: Home Health Therapies & Aides   Number of Visits Requested: 1     Home Care OT Referral for Hospital Discharge   Referral Priority: Routine   Number of Visits Requested: 1     MD face to face encounter   Order Comments: Documentation of Face to Face and Certification for Home Health Services    I certify that patient: Kim Anne is under my care and that I, or a nurse practitioner or physician's assistant working with me, had a face-to-face encounter that meets the physician face-to-face encounter requirements with this patient on: 12/11/2019.    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 .    I certify that, based on my findings, the following services are medically necessary home health services: Nursing, Occupational Therapy and Physical Therapy.    My clinical findings support the need for the above services because: Nurse is needed: To assess medication management, home safety after changes in medications or other medical regimen.., Occupational Therapy Services are needed to assess and treat cognitive ability and address ADL safety due to impairment in independence with activities of daily living. and Physical Therapy Services are needed to assess and treat the following functional impairments: mobility, strength and endurance.     Further, I certify that my clinical findings support that this patient is homebound (i.e. absences from home require considerable and taxing effort and are for medical reasons or  [employer] services or infrequently or of short duration when for other reasons) because: Requires assistance of another person or specialized equipment to access medical services because patient: Requires supervision of another for safe transfer...    Based on the above findings. I certify that this patient is confined to the home and needs intermittent skilled nursing care, physical therapy and/or speech therapy.  The patient is under my care, and I have initiated the establishment of the plan of care.  This patient will be followed by a physician who will periodically review the plan of care.  Physician/Provider to provide follow up care: Ailyn Nieves    Attending hospital physician (the Medicare certified [facility] provider): Pascale Carranza MD  Physician Signature: See electronic signature associated with these discharge orders.  Date: 12/11/2019     General info for SNF   Order Comments: Length of Stay Estimate: Short Term Care: Estimated # of Days <30  Condition at Discharge: Improving  Level of care:skilled   Rehabilitation Potential: Good  Admission H&P remains valid and up-to-date: Yes  Recent Chemotherapy: N/A  Use Nursing Home Standing Orders: Yes     Mantoux instructions   Order Comments: Give two-step Mantoux (PPD) Per Facility Policy Yes     Reason for your hospital stay   Order Comments: You were admitted to the observation unit for shortness of breath and chest pain.  You were dialyzed several times to pull fluid from your body to help improve your breathing.  You had labs checking for heart damage that were negative, stress test was normal.  No abnormalities seen on heart monitor. Nephrology noticed slight redness around the fistula site and recommended a course of antibiotics for this.     Glucose monitor nursing POCT   Order Comments: Once daily before breakfast     Activity - Up with nursing assistance     Order Specific Question Answer Comments   Is discharge order? Yes      Follow Up ([facility])    Order Comments: Follow up with primary care provider, Ailyn Nieves, within 7 days for hospital follow- up.  No follow up labs or test are needed.      Appointments on [place] and/or [facility] (with [facility] or [facility] provider or service). Call [phone] if you haven't heard regarding these appointments within 7 days of discharge.     Full Code     Order Specific Question Answer Comments   Code status determined by: Discussion with patient/legal decision maker      Occupational Therapy Adult Consult   Order Comments: Evaluate and treat as clinically indicated.    Reason:  weakness     Physical Therapy Adult Consult   Order Comments: Evaluate and treat as clinically indicated.    Reason:  weakness     Fall precautions     Advance Diet as Tolerated   Order Comments: Follow this diet upon discharge: Orders Placed This Encounter      Consistent Carbohydrate Diet 2008-0533 Calories: Moderate Consistent CHO (4-6 CHO units/meal), dialysis diet     Order Specific Question Answer Comments   Is discharge order? Yes           Attestation:  JUNIOR Doran CNP.

## 2019-12-14 NOTE — PROGRESS NOTES
Maddie butterfield came to pick pt on wheel chair, will bring pt to [facility]. PIV removed. Patient received belonging. Patient discharge.

## 2019-12-14 NOTE — PLAN OF CARE
List all goals to be met before discharge home:   - Serial troponins and stress test complete-yes  - Seen and cleared by consultant if applicable -yes  - Adequate pain control on oral analgesia-yes   - Vital signs normal or at patient baseline -yes  - Safe disposition plan has been identified-YES:  pt had had dialysis this AM. discharge to [facility]. Has ride scheduled for 1700  Right fistula site red and area marked. No increase from previous marked are. Denies pain and tenderness to site. Bruit and thrill noted.   - Nurse to notify provider when observation goals have been met and patient is ready for discharge.

## 2019-12-14 NOTE — PROGRESS NOTES
Observation goals PRIOR TO DISCHARGE     Comments: List all goals to be met before discharge home:   - Serial troponins and stress test complete. YES: cleared from cardiac standpoint    - Seen and cleared by consultant if applicable YES    - Adequate pain control on oral analgesia YES, oxy administered.    - Vital signs normal or at patient baseline Yes, /52 (BP Location: Left arm)   Pulse 57   Temp 98.2  F (36.8  C) (Oral)   Resp 16   Wt 58.8 kg (129 lb 10.1 oz)   SpO2 94%   BMI 21.57 kg/m      - Safe disposition plan has been identified YES: discharge planned tomorrow at 1530    - Nurse to notify provider when observation goals have been met and patient is ready for discharge.

## 2019-12-14 NOTE — PROGRESS NOTES
Social Work Services Progress Note    Hospital Day: 5  Date of Initial Social Work Evaluation:  11/25/19  Collaborated with:  Nurse practitioner    Data: SW following 74 year old female pt for discharge planning.     Intervention:  Nurse Practitioner requested that 3:30pm ride planned for today be delayed to accommodate 11:30 dialysis. Spoke with [facility] [phone], who is able to accommodate 5pm ride.  Nursing will inform patient of new ride time. Writer left voicemail for [facility] admissions [phone] to inform of new ride time. Spoke with Yakelin at [facility] who is agreeable to new admission time. Discharge orders and summary faxed to [phone] Paged by nephrology at 1pm, requesting that social work reach out to nursing home to confirm they are aware of her dialysis clinic information. Yakelin at [facility] able to confirm that Kim has dialysis on Tuesday, Thursday, Saturday at 7am at [facility] [address] in [facility]. Their records indicate that family or friends transport Kim to dialysis, and have staff confirm transportation needs with Kim after admission.     Assessment: Per previous  notes, Pt agreeable to discharge tomorrow.     Plan:    Anticipated Disposition:  Facility:  [facility]     Barriers to d/c plan:  NA    Follow Up:  Will fax discharge orders when completed.         Vania Echavarria, LAMINE   12/14/2019    Text paging available through [employer] on [employer] - search SOCIAL WORK    ON CALL PAGER   0800 - 1600   [phone]    ON CALL COVERAGE AFTER 1600  [phone]

## 2019-12-14 NOTE — PROGRESS NOTES
Observation goals PRIOR TO DISCHARGE     Comments: List all goals to be met before discharge home:   - Serial troponins and stress test complete. YES: cleared from cardiac standpoint    - Seen and cleared by consultant if applicable YES    - Adequate pain control on oral analgesia YES, oxy administered.    - Vital signs normal or at patient baseline Yes    - Safe disposition plan has been identified YES: discharge planned  1530 today    - Nurse to notify provider when observation goals have been met and patient is ready for discharge.

## 2019-12-14 NOTE — PLAN OF CARE
List all goals to be met before discharge home:   - Serial troponins and stress test complete-yes  - Seen and cleared by consultant if applicable-yes  - Adequate pain control on oral analgesia-yes  - Vital signs normal or at patient baseline-yes  - Safe disposition plan has been identified-yes    - Nurse to notify provider when observation goals have been met and patient is ready for discharge.

## 2019-12-14 NOTE — PROGRESS NOTES
Observation goals PRIOR TO DISCHARGE     Comments: List all goals to be met before discharge home:   - Serial troponins and stress test complete. YES: cleared from cardiac standpoint    - Seen and cleared by consultant if applicable YES    - Adequate pain control on oral analgesia YES, oxy administered.    - Vital signs normal or at patient baseline Yes    - Safe disposition plan has been identified YES: discharge planned tomorrow at 1530    - Nurse to notify provider when observation goals have been met and patient is ready for discharge.

## 2019-12-14 NOTE — DISCHARGE SUMMARY
Division of Renal Diseases and HTN  Phone: [phone]  Fax: [phone]    Kim Anne  MRN: [id number]  YOB: 1945    Dialysis Unit: [facility] phone [phone]  Primary Nephrologist: Dr Liz    Date of Admission: 12/10/2019  Date of Discharge: 12/14/19   Discharge Diagnosis: hypervolemia - new target weight 56.8 kg  - mild infection of new AV fistula on augmentin x1 week    [   ] New initiation, new dialysis orders will be faxed.    [ X] Resume all previous dialysis orders with exception as noted below    New Orders (if not applicable put NA):  Estimated Dry Weight   * 56.8 kg *   Dialysis Duration    Dialysis Access    Antibiotics (dose per dialysis, end date)            Labs to be drawn at dialysis      Other major changes to dialysis prescription (e.g. Dialysate bath, heparin, blood flow rate, etc)      Medication changes (also fax the unit a copy of the discharge summary)              Current Discharge Medication List      START taking these medications    Details   amoxicillin-clavulanate (AUGMENTIN) 500-125 MG tablet Take 1 tablet by mouth every 24 hours for 6 days    Associated Diagnoses: Infection of arteriovenous fistula, initial encounter (H)         CONTINUE these medications which have CHANGED    Details   oxyCODONE (ROXICODONE) 5 MG tablet Take 1 tablet (5 mg) by mouth every 8 hours as needed for moderate to severe pain  Qty: 90 tablet, Refills: 0    Associated Diagnoses: Chronic low back pain without sciatica, unspecified back pain laterality         CONTINUE these medications which have NOT CHANGED    Details   albuterol (PROAIR HFA/PROVENTIL HFA/VENTOLIN HFA) 108 (90 Base) MCG/ACT inhaler Inhale 2 puffs into the lungs every 6 hours as needed for shortness of breath / dyspnea or wheezing  Qty: 18 g, Refills: 3    Associated Diagnoses: Chronic obstructive pulmonary disease, unspecified COPD type (H)      fluticasone-salmeterol (ADVAIR) 250-50 MCG/DOSE inhaler Inhale 1  puff into the lungs every 12 hours  Qty: 1 Inhaler, Refills: 5    Associated Diagnoses: COPD exacerbation (H)      albuterol (PROVENTIL) (2.5 MG/3ML) 0.083% neb solution Take 1 vial (2.5 mg) by nebulization every 6 hours as needed for shortness of breath / dyspnea or wheezing  Qty: 180 mL, Refills: 3    Associated Diagnoses: COPD exacerbation (H)      ammonium lactate (LAC-HYDRIN) 12 % external lotion Apply topically 2 times daily  Qty: 225 g, Refills: 0    Associated Diagnoses: Dry skin      ASPIR-LOW 81 MG EC tablet Take 1 tablet (81 mg) by mouth daily  Qty: 90 tablet, Refills: 3    Associated Diagnoses: History of MI (myocardial infarction); Essential hypertension, benign      atorvastatin (LIPITOR) 40 MG tablet Take 1 tablet (40 mg) by mouth daily  Qty: 90 tablet, Refills: 1    Associated Diagnoses: Hyperlipidemia LDL goal <100      blood glucose monitoring (FREESTYLE LITE) test strip TEST BLOOD SUGAR TWO TIMES A DAY  Qty: 50 strip, Refills: 12    Associated Diagnoses: Type 2 diabetes mellitus without complication, with long-term current use of insulin (H)      blood glucose monitoring (FREESTYLE) lancets Test BS two times daily as directed  Qty: 100 each, Refills: 1    Associated Diagnoses: Type 2 diabetes mellitus without complication, with long-term current use of insulin (H)      docusate sodium (COLACE) 100 MG capsule Take 200 mg by mouth daily Pt last took 11/19/19  Qty: 60 capsule, Refills: 4    Associated Diagnoses: Constipation, unspecified constipation type      Emollient (EUCERIN CALMING DAILY MOIST) CREA Externally apply 1 dose. topically daily  Qty: 1 Tube, Refills: 12    Associated Diagnoses: Type II or unspecified type diabetes mellitus with neurological manifestations, not stated as uncontrolled(250.60) (H)      levothyroxine (SYNTHROID/LEVOTHROID) 200 MCG tablet Take 1 tablet (200 mcg) by mouth daily  Qty: 90 tablet, Refills: 1    Associated Diagnoses: Other specified hypothyroidism       multivitamin RENAL (NEPHROCAPS/TRIPHROCAPS) 1 MG capsule Take 1 capsule by mouth daily Pt doesn't recall last dose 11/20/19      nitroGLYcerin (NITROSTAT) 0.4 MG sublingual tablet Place 1 tablet (0.4 mg) under the tongue every 5 minutes as needed for chest pain  Qty: 25 tablet, Refills: 0    Associated Diagnoses: History of MI (myocardial infarction)      nystatin (MYCOSTATIN) 588256 UNIT/GM POWD Apply 1 g topically 3 times daily as needed  Qty: 30 g, Refills: 1    Associated Diagnoses: Groin rash      !! order for DME Equipment being ordered: Nebulizer  Qty: 1 Device, Refills: 0    Associated Diagnoses: Chronic obstructive pulmonary disease, unspecified COPD type (H)      !! order for DME Equipment being ordered: Boost.  Qty: 6 Can, Refills: 3    Associated Diagnoses: CKD (chronic kidney disease) stage 5, GFR less than 15 ml/min (H)      !! order for DME Equipment being ordered: cane  Qty: 1 each, Refills: 0    Associated Diagnoses: Non-compliant patient      !! order for DME Equipment being ordered: Diabetic Shoes  Qty: 1 each, Refills: 0    Associated Diagnoses: Type 2 diabetes mellitus with stage 5 chronic kidney disease not on chronic dialysis, without long-term current use of insulin (H)      !! order for DME Equipment being ordered: two pairs moderate knee high support hose  Qty: 2 Device, Refills: 1    Associated Diagnoses: Edema, unspecified type      !! order for DME Equipment being ordered: Compression socks.  Strength:15-20 mmHg  Qty: 2 each, Refills: 0    Associated Diagnoses: Localized edema      !! order for DME One wheeled walker with seat and brakes and basket  Qty: 1 Device, Refills: 0    Associated Diagnoses: Risk for falls      polyethylene glycol (MIRALAX/GLYCOLAX) packet Take 17 g by mouth daily as needed for constipation  Qty: 10 packet, Refills: 0    Associated Diagnoses: Slow transit constipation      tiotropium (SPIRIVA) 18 MCG inhaled capsule Inhale 1 capsule (18 mcg) into the lungs  daily  Qty: 30 capsule, Refills: 11    Associated Diagnoses: Chronic bronchitis, unspecified chronic bronchitis type (H); Pulmonary emphysema, unspecified emphysema type (H)      vitamin D3 (CHOLECALCIFEROL) 2000 units (50 mcg) tablet Take 1 tablet (2,000 Units) by mouth daily  Qty: 90 tablet, Refills: 3    Associated Diagnoses: Vitamin D deficiency disease       !! - Potential duplicate medications found. Please discuss with provider.           Michelle Laurent MD  [facility]  Department of Medicine  Division of Renal Disease and Hypertension  [phone]

## 2019-12-16 ENCOUNTER — PATIENT OUTREACH (OUTPATIENT)
Dept: GERIATRIC MEDICINE | Facility: CLINIC | Age: 74
End: 2019-12-16

## 2019-12-16 NOTE — PROGRESS NOTES
[facility] Care Coordination Contact    12/13/19 Rec'd vm from Pratibha ENG to report that member will discontinue/ to the [facility] later today.  12/16/19 Noted in Epic that member discharged from [facility] on 12/14/19  CC to follow.  Sakina Carrion RN, BC  Manager [facility] Care Coordinator   [phone]  (Fax)

## 2019-12-16 NOTE — PROGRESS NOTES
TRANSITIONS OF CARE (KERRIE) LOG   KERRIE tasks should be completed by the CC within one (1) business day of notification of each transition. Follow up contact with member is required after return to their usual care setting.  Note:  If CC finds out about the transitions fifteen (15) days or more after the member has returned to their usual care setting, no KERRIE log is needed. However, the CC should check in with the member to discuss the transition process, any changes needed to the care plan and document it in a case note.    Member Name:  Kim Anne O Name:  Bk O/Health Plan Member ID#: [id number]   Product: [facility] Care Coordinator Contact:  Sakina Carrion RN Agency/County/Care System: [employer]   Transition Communication Actions from Care Management Contact   Transition #1   Notification Date: 12/16/19 Transition Date:   12/14/19 Transition From: Hospital, [facility]      Is this the member s usual care setting?               no Transition To: Skilled Nursing Facility, [facility]   Transition Type:  Planned  Reason for Admission/Comments:  Short term rehab  CC received notification of admission to [facility] on 12/14/19, from [facility].    OBRA 1 right faxed to Nursing Home admissions office.-pending fax number.    CC contacted Debra [phone] and reviewed community POC. CC requested to be notified of concerns, care conference dates and discharge planning.    Shared that CC would like to complete assessment to open to EW for home delivered meals. Explained that it is unlikely that any homecare services would be able to be placed (hmkg) due to the current condition of the home.  Shared that member has Dialysis Tu/Th and Sat @ 7 AM, that member's daughter had been transporting her, but unsure if she is able to assist while member is in TCU.    Care Conference scheduled for 12/20/19 @ 10:30, CC will complete assessment at 9:30. Debra to inform member of assessment    Transition log updated.   PCP notified of transition to TCU via EMR.  Call placed to [employer], member remains off MA.  CC informed that DHS form 3727 Combined Application will need to be completed. CC informed that member does not receive Food Support for over 1 year. Once MA has been renewed, member can be opened to .  Per [facility], member has 3 dependents listed; her son Ki (50 years of age), Ki's children 17 and 10 years of age.    left with Debra ENG to inform of MA status, also request a return call with Fax number to send OBRA 1.  Sakina Carrion RN, BC  Manager [facility] Care Coordinator   [phone]  (Fax)           Shared CC contact info, care plan/services with receiving setting--Date completed: 12/16/19   Notified PCP of transition--Date completed:  12/14/19     via  EMR   Transition #2   Transition #3  (if applicable)   Notification Date: 12/23/2019         Transition To:  Home  Transition Date: 12/20/2019     Transition Type:    Planned  Notified PCP -- Date completed: 12/20/19              Shared CC contact info, care plan/services with receiving setting or, if applicable, home care agency--Date completed:  12/23/19  *Complete additional tasks below, if this transition is a return to usual care setting.      Comments:    CC received notification of discharge to home. Discharge occurred on (12/20/19) from [facility]).   CC contacted member and there was no answer.  Allowed the phone to ring over 10 times, with no answer.  Member has a follow-up appointment with PCP in 7 days: Epic indicates a follow up appointment at PCC for 1/7/19  Member has had a change in condition: No  Home visit needed: No  Care plan reviewed and updated.  The following home based services None were resumed.  New referrals placed: No  Transition log completed.   PCP notified of transition back to home via EMR.     [facility]  [facility]  [phone]     Notification  Date:     Transition To:   Transition Date:           Transition Type:   Notified PCP--Date completed:        Shared CC contact info, care plan/services with receiving setting or, if applicable, home care agency--Date completed:  *Complete additional tasks below, if this transition is a return to usual care setting.      Comments:       *Complete tasks below when the member is discharging TO their usual care setting within one (1) business day of notification.  For situations where the Care Coordinator is notified of the discharge prior to the date of discharge, the Care Coordinator must follow up with the member or designated representative to confirm that discharge actually occurred and discuss required KERRIE tasks as outlined in the KERRIE Instructions.  (This includes situations where it may be a  new  usual care setting for the member. (i.e., a community member who decides upon permanent nursing home placement following hospitalization and rehab).    Date completed: 12/23/19  Communicated with member or their designated representative about the following:  care transition process; about changes to the member s health status; plan of care updates; education about transitions and how to prevent unplanned transitions/readmissions  Four Pillars for Optimal Transition:    Check  Yes  - if the member, family member and/or SNF/facility staff manages the following:    If  No  provide explanation in the comments section.          [x]  Yes     []  No     Does the member have a follow-up appointment scheduled with primary care or specialist? (Mental health hospitalizations--the appt. should be w/in 7 days)   [x]  Yes     []  No     Can the member manage their medications or is there a system in place to manage medications (e.g. home care set-up)?         [x]  Yes     []  No     Can the member verbalize warning signs and symptoms to watch for and how to respond?         [x]  Yes     []  No     Does the member use a Personal  Health Care Record?  Check  Yes  if visit summary, discharge summary, and/or healthcare summary are being used as a PHR.                                                                                                                                                                                    [] Yes      [x] No      Have you updated the member s care plan?  If  No  provide explanation in comments.   Comments:

## 2019-12-20 ENCOUNTER — PATIENT OUTREACH (OUTPATIENT)
Dept: GERIATRIC MEDICINE | Facility: CLINIC | Age: 74
End: 2019-12-20

## 2019-12-20 DIAGNOSIS — Z76.89 HEALTH CARE HOME: ICD-10-CM

## 2019-12-20 ASSESSMENT — ACTIVITIES OF DAILY LIVING (ADL): DEPENDENT_IADLS:: INDEPENDENT

## 2019-12-20 NOTE — PROGRESS NOTES
"[facility] Care Coordination Contact    [facility] Home Visit Assessment     Home visit for Health Risk Assessment with Kim Anne completed on December 20, 2019.  Member is currently at [facility], assessment completed with member in her room.      Type of residence:: Private home - stairs  Current living arrangement:: I live in a private home with family. Member states that she resides in her home with her son Ki and his children (18 and 10 years of age). Member states that she is very satisfied in her home and the arrangements.     Assessment completed with:: Patient and Ricardo PT.   ADL and cognitive scores provided by Ricardo PT.     Current Care Plan  Member currently receiving the following home care services: None    Member currently receiving the following community resources:   None      Medication Review  Medication reconciliation completed in Epic: No.  Debra ENG to e-mail CC current medication list.   Medication set-up & administration: Reports that medications were set up in a pill box.   Self-administers medications.  Member states that she has no concerns with taking her medications, states that she does not need a homecare nurse.  Member states that she, \"or anyone in the family,\" will set up medications in a pill box.  Member states that she does not recall if she can find her med box.   Medication Risk Assessment Medication : Member will benefit from assistance with medication set up/management.     Mental/Behavioral Health   Depression Screening: See PHQ assessment flowsheet.   Mental health DX:: No      Mental Health Services: None: No further intervention needed at this time.    Falls Assessment:   Fallen 2 or more times in the past year?: No   Any fall with injury in the past year?: No    ADL/IADL Dependencies:   Dependent ADLs:: Ambulation-walker  Dependent IADLs:: Kim reports that she is indep with all self cares, states that she is able to utilize a stove, " "washing machines, etc.  Kim states that her family also assists with all household tasks; shopping, cooking, cleaning.      Discussed this CC concern of the condition of the home at an earlier visit, concern that the clutter may put her at risk for falls. Member smiled and said that her granddaughter is always going through the clothing.      [facility] Plan sponsored benefits: Shared information re: Silver Sneakers/gym memberships, ASA, Calcium +D.    PCA Assessment completed at visit: No, Kim is indep with all ADL's.     Elderly Waiver Eligibility: Member meets EW criteria. Member declines EW, \"I don't want them to take my house from me.\"  Offered to call Yulisa Nair and review EW/finances., member agreed. CC and member on hold > 15 minutes, and did not have time available to continue. Member states that she does not want any services.   Inquired if she had food in the home, Kim states that she does and her family will help if needed. Inquired if there was heat in the home, Kim stated that there are no concerns.     Per PT/OT  (Care Conference notes) MoCA score 13/30 moderate impairment. OT recommends daily supervision to assist with meal prep, managing medical appt's and medication.  PT reports that member is ambulating 500 feet with 4WW, and walks without walker 225 feet, transfers indep, indep with multiple steps with handrail.       Care Plan & Recommendations:   TCU recommends discharge today with homecare RN/PT/OT and MOW. Member is declining MOW at this time. Initially member refused homecare, after further discussion, member may be agreeable.  Member states that she will be going home today.     CC spoke with Debra SW request that she place referrals to [facility], CC will f/u with member. Member has a PCC appt scheduled for 1/7/19.   Member completed the Combined Application to reinstate her MA eligibility, form provided to Debra who will fax to Yulisa Nair.        See [facility] for detailed assessment " information.    Follow-Up Plan: Member informed of future contact, plan to f/u with member with a 6 month telephone assessment.  Contact information shared with member and family, encouraged member to call with any questions or concerns at any time.    [facility] continuum providers: Please refer to Health Care Home on the [facility] Problem List to view this patient's [facility] Care Plan Summary.    12/26/2019 Call placed to Usama Nair to inquire if the Combined Application has been received, and status of MA eligibility. CC informed that they have not rec'd the Combined Application.    left with Debra ENG at [facility] requesting a return call and reqeust that she fax the application to Yulisa Nair again and e-mail the application to this CC.  12/26/19 Rec'd e-mail from Debra ENG that she refaxed the Medical Assistance paperwork to Yulisa Nair.   Sakina Carrion RN, BC  Manager [facility] Care Coordinator   [phone]  (Fax)

## 2019-12-23 ENCOUNTER — CARE COORDINATION (OUTPATIENT)
Dept: FAMILY MEDICINE | Facility: CLINIC | Age: 74
End: 2019-12-23

## 2019-12-25 ENCOUNTER — HOSPITAL ENCOUNTER (EMERGENCY)
Facility: CLINIC | Age: 74
Discharge: HOME | End: 2019-12-25
Attending: EMERGENCY MEDICINE | Admitting: EMERGENCY MEDICINE
Payer: COMMERCIAL

## 2019-12-25 ENCOUNTER — APPOINTMENT (OUTPATIENT)
Dept: GENERAL RADIOLOGY | Facility: CLINIC | Age: 74
End: 2019-12-25
Attending: EMERGENCY MEDICINE
Payer: COMMERCIAL

## 2019-12-25 VITALS
RESPIRATION RATE: 18 BRPM | BODY MASS INDEX: 22.63 KG/M2 | WEIGHT: 136 LBS | DIASTOLIC BLOOD PRESSURE: 77 MMHG | OXYGEN SATURATION: 92 % | SYSTOLIC BLOOD PRESSURE: 106 MMHG | TEMPERATURE: 98.4 F | HEART RATE: 81 BPM

## 2019-12-25 DIAGNOSIS — R06.00 DYSPNEA, UNSPECIFIED TYPE: ICD-10-CM

## 2019-12-25 DIAGNOSIS — E87.6 HYPOKALEMIA: ICD-10-CM

## 2019-12-25 LAB
ALBUMIN SERPL-MCNC: 2.8 G/DL (ref 3.4–5)
ALP SERPL-CCNC: 298 U/L (ref 40–150)
ALT SERPL W P-5'-P-CCNC: 24 U/L (ref 0–50)
ANION GAP SERPL CALCULATED.3IONS-SCNC: 13 MMOL/L (ref 3–14)
AST SERPL W P-5'-P-CCNC: 32 U/L (ref 0–45)
BASOPHILS # BLD AUTO: 0 10E9/L (ref 0–0.2)
BASOPHILS NFR BLD AUTO: 0.5 %
BILIRUB SERPL-MCNC: 0.4 MG/DL (ref 0.2–1.3)
BUN SERPL-MCNC: 88 MG/DL (ref 7–30)
CALCIUM SERPL-MCNC: 8.1 MG/DL (ref 8.5–10.1)
CHLORIDE SERPL-SCNC: 106 MMOL/L (ref 94–109)
CO2 SERPL-SCNC: 20 MMOL/L (ref 20–32)
CREAT SERPL-MCNC: 8.33 MG/DL (ref 0.52–1.04)
DIFFERENTIAL METHOD BLD: ABNORMAL
EOSINOPHIL # BLD AUTO: 0.2 10E9/L (ref 0–0.7)
EOSINOPHIL NFR BLD AUTO: 2.1 %
ERYTHROCYTE [DISTWIDTH] IN BLOOD BY AUTOMATED COUNT: 14.9 % (ref 10–15)
GFR SERPL CREATININE-BSD FRML MDRD: 4 ML/MIN/{1.73_M2}
GLUCOSE SERPL-MCNC: 126 MG/DL (ref 70–99)
HCT VFR BLD AUTO: 31.9 % (ref 35–47)
HGB BLD-MCNC: 10.1 G/DL (ref 11.7–15.7)
IMM GRANULOCYTES # BLD: 0 10E9/L (ref 0–0.4)
IMM GRANULOCYTES NFR BLD: 0.1 %
LYMPHOCYTES # BLD AUTO: 0.8 10E9/L (ref 0.8–5.3)
LYMPHOCYTES NFR BLD AUTO: 8.8 %
MCH RBC QN AUTO: 32.2 PG (ref 26.5–33)
MCHC RBC AUTO-ENTMCNC: 31.7 G/DL (ref 31.5–36.5)
MCV RBC AUTO: 102 FL (ref 78–100)
MONOCYTES # BLD AUTO: 0.4 10E9/L (ref 0–1.3)
MONOCYTES NFR BLD AUTO: 4.9 %
NEUTROPHILS # BLD AUTO: 7.1 10E9/L (ref 1.6–8.3)
NEUTROPHILS NFR BLD AUTO: 83.6 %
NRBC # BLD AUTO: 0 10*3/UL
NRBC BLD AUTO-RTO: 0 /100
NT-PROBNP SERPL-MCNC: ABNORMAL PG/ML (ref 0–900)
PLATELET # BLD AUTO: 346 10E9/L (ref 150–450)
POTASSIUM SERPL-SCNC: 5.7 MMOL/L (ref 3.4–5.3)
PROT SERPL-MCNC: 7.5 G/DL (ref 6.8–8.8)
RBC # BLD AUTO: 3.14 10E12/L (ref 3.8–5.2)
SODIUM SERPL-SCNC: 139 MMOL/L (ref 133–144)
WBC # BLD AUTO: 8.5 10E9/L (ref 4–11)

## 2019-12-25 PROCEDURE — 99291 CRITICAL CARE FIRST HOUR: CPT | Mod: 25 | Performed by: EMERGENCY MEDICINE

## 2019-12-25 PROCEDURE — 85025 COMPLETE CBC W/AUTO DIFF WBC: CPT | Performed by: EMERGENCY MEDICINE

## 2019-12-25 PROCEDURE — 25000125 ZZHC RX 250: Performed by: EMERGENCY MEDICINE

## 2019-12-25 PROCEDURE — 94640 AIRWAY INHALATION TREATMENT: CPT | Performed by: EMERGENCY MEDICINE

## 2019-12-25 PROCEDURE — 25000132 ZZH RX MED GY IP 250 OP 250 PS 637: Performed by: EMERGENCY MEDICINE

## 2019-12-25 PROCEDURE — 83880 ASSAY OF NATRIURETIC PEPTIDE: CPT | Performed by: EMERGENCY MEDICINE

## 2019-12-25 PROCEDURE — 93005 ELECTROCARDIOGRAM TRACING: CPT | Performed by: EMERGENCY MEDICINE

## 2019-12-25 PROCEDURE — 93010 ELECTROCARDIOGRAM REPORT: CPT | Mod: Z6 | Performed by: EMERGENCY MEDICINE

## 2019-12-25 PROCEDURE — 99285 EMERGENCY DEPT VISIT HI MDM: CPT | Mod: 25 | Performed by: EMERGENCY MEDICINE

## 2019-12-25 PROCEDURE — 80053 COMPREHEN METABOLIC PANEL: CPT | Performed by: EMERGENCY MEDICINE

## 2019-12-25 PROCEDURE — 71046 X-RAY EXAM CHEST 2 VIEWS: CPT

## 2019-12-25 RX ORDER — SODIUM POLYSTYRENE SULFONATE 15 G/60ML
30 SUSPENSION ORAL; RECTAL ONCE
Status: COMPLETED | OUTPATIENT
Start: 2019-12-25 | End: 2019-12-25

## 2019-12-25 RX ORDER — IPRATROPIUM BROMIDE AND ALBUTEROL SULFATE 2.5; .5 MG/3ML; MG/3ML
3 SOLUTION RESPIRATORY (INHALATION) ONCE
Status: COMPLETED | OUTPATIENT
Start: 2019-12-25 | End: 2019-12-25

## 2019-12-25 RX ADMIN — SODIUM POLYSTYRENE SULFONATE 30 G: 15 SUSPENSION ORAL; RECTAL at 10:04

## 2019-12-25 RX ADMIN — IPRATROPIUM BROMIDE AND ALBUTEROL SULFATE 3 ML: .5; 3 SOLUTION RESPIRATORY (INHALATION) at 09:52

## 2019-12-25 ASSESSMENT — ENCOUNTER SYMPTOMS
ARTHRALGIAS: 0
EYE REDNESS: 0
COLOR CHANGE: 0
ABDOMINAL PAIN: 0
NECK STIFFNESS: 0
HEADACHES: 0
SHORTNESS OF BREATH: 1
FEVER: 0
CONFUSION: 0
DIFFICULTY URINATING: 0

## 2019-12-25 NOTE — ED PROVIDER NOTES
History     Chief Complaint   Patient presents with     Shortness of Breath     SOB since last night     HPI  Kim Anne is a 74 year old female with a history of COPD, myocardial infarction, peripheral vascular disease, chronic kidney disease on dialysis Tuesday Thursday Saturday, hypothyroidism, diabetes mellitus, hypothyroidism and atypical chest pain who presents for evaluation of shortness of breath.  Patient notes that her shortness of breath began approximately 12 hours earlier or on Christmas eve.  She states that her shortness of breath was somewhat gradual and is predominantly with exertion.  She notes that she has had a cough for approximately 2 weeks that has not significantly changed.  She states that she presented to dialysis yesterday on Christmas eve but it was closed.  She presented again this morning on [place] Day and notes that dialysis was again closed.  She comes to the emergency room without other symptoms including nausea, vomiting, diarrhea, fever, chills, sweats.  She was admitted recently from December 10-14 with chest pain and shortness of breath.  She was noted at that time to be volume overloaded and she underwent ultrafiltration.  She had an echo and Lexiscan revealing no regional wall motion abnormalities but pulmonary hypertension.  She was started on antibiotics for erythema around fistula.  She finished Augmentin in the last several days.    I have reviewed the Medications, Allergies, Past Medical and Surgical History, and Social History in the Epic system.    Review of Systems   Constitutional: Negative for fever.   HENT: Negative for congestion.    Eyes: Negative for redness.   Respiratory: Positive for shortness of breath ( With exertion).    Cardiovascular: Negative for chest pain.   Gastrointestinal: Negative for abdominal pain.   Genitourinary: Negative for difficulty urinating.   Musculoskeletal: Negative for arthralgias and neck stiffness.   Skin: Negative for  color change.   Neurological: Negative for headaches.   Psychiatric/Behavioral: Negative for confusion.       Physical Exam   BP: (!) 142/79  Pulse: 79  Temp: 98.2  F (36.8  C)  Resp: 26  Weight: 63 kg (139 lb)  SpO2: 91 %      Physical Exam  Constitutional:       General: She is not in acute distress.     Appearance: She is not diaphoretic.   HENT:      Head: Atraumatic.      Mouth/Throat:      Pharynx: No oropharyngeal exudate.   Eyes:      General: No scleral icterus.     Pupils: Pupils are equal, round, and reactive to light.   Cardiovascular:      Heart sounds: Normal heart sounds.   Pulmonary:      Effort: No respiratory distress.      Breath sounds: Rales present.   Abdominal:      General: Bowel sounds are normal.      Palpations: Abdomen is soft.      Tenderness: There is no abdominal tenderness.   Musculoskeletal:         General: No tenderness.   Skin:     General: Skin is warm.      Findings: No rash.         ED Course        Procedures      Chest x-ray reveals findings consistent with volume overload       EKG Interpretation:      Interpreted by Hakeem Craft MD  Time reviewed: 8:45 AM  Symptoms at time of EKG: None   Rhythm: Normal sinus   Rate: Normal  Axis: Normal  Ectopy: None  Conduction: Normal  ST Segments/ T Waves: No acute ischemic changes  Q Waves: None  Comparison to prior: Unchanged from December 10, 2019 other than first-degree AV block is no longer present    Clinical Impression: no acute changes         Labs Ordered and Resulted from Time of ED Arrival Up to the Time of Departure from the ED   COMPREHENSIVE METABOLIC PANEL - Abnormal; Notable for the following components:       Result Value    Potassium 5.7 (*)     Glucose 126 (*)     Urea Nitrogen 88 (*)     Creatinine 8.33 (*)     GFR Estimate 4 (*)     GFR Estimate If Black 5 (*)     Calcium 8.1 (*)     Albumin 2.8 (*)     Alkaline Phosphatase 298 (*)     All other components within normal limits   CBC WITH PLATELETS DIFFERENTIAL -  Abnormal; Notable for the following components:    RBC Count 3.14 (*)     Hemoglobin 10.1 (*)     Hematocrit 31.9 (*)      (*)     All other components within normal limits   NT PROBNP INPATIENT - Abnormal; Notable for the following components:    N-Terminal Pro BNP Inpatient 97,275 (*)     All other components within normal limits   MEASURE WEIGHT          Critical Care time was 45 minutes for this patient excluding procedures including review of old records, bedside management, discussion with healthcare providers and documentation.      Assessments & Plan (with Medical Decision Making)   74-year-old female presents for evaluation of shortness of breath.  Differential included pulmonary embolus, volume overload, pneumonia, pneumothorax, CHF.  Exam revealed scattered rales especially bibasilar.  Saturation was normal on room air 95%.  Laboratories were obtained including CBC revealing macrocytic anemia close to baseline.  N-terminal BNP was elevated at 97,000 consistent with volume overload.  Other laboratories revealed slight elevation in potassium of 5.7, glucose of 126, alkaline phosphatase of 298 and elevated BUN and creatinine consistent with end-stage renal disease.  Patient was treated with Kayexalate as well as DuoNeb and felt improved.  She was able to walk around the emergency room without desaturating though did feel mild dyspnea.  She requires ultrafiltration and was encouraged to attend dialysis as scheduled in 20 hours.    I have reviewed the nursing notes.    I have reviewed the findings, diagnosis, plan and need for follow up with the patient.    Discharge Medication List as of 12/25/2019 11:19 AM          Final diagnoses:   Dyspnea, unspecified type   Hypokalemia       12/25/2019   [facility], [place], EMERGENCY DEPARTMENT     Hakeem Craft MD  12/25/19 4474

## 2019-12-25 NOTE — ED AVS SNAPSHOT
[facility], [place], Emergency Department  [address]  [place] [address]  Phone:  [phone]  Fax:  [phone]                                    Kim Anne   MRN: [id number]    Department:  [facility], Emergency Department   Date of Visit:  12/25/2019           After Visit Summary Signature Page    I have received my discharge instructions, and my questions have been answered. I have discussed any challenges I see with this plan with the nurse or doctor.    ..........................................................................................................................................  Patient/Patient Representative Signature      ..........................................................................................................................................  Patient Representative Print Name and Relationship to Patient    ..................................................               ................................................  Date                                   Time    ..........................................................................................................................................  Reviewed by Signature/Title    ...................................................              ..............................................  Date                                               Time          22EPIC Rev 08/18

## 2019-12-26 ENCOUNTER — PATIENT OUTREACH (OUTPATIENT)
Dept: GERIATRIC MEDICINE | Facility: CLINIC | Age: 74
End: 2019-12-26

## 2019-12-26 NOTE — PROGRESS NOTES
[facility] Care Coordination Contact  CC received notification of Emergency Room visit.  ER visit occurred on 12/24/2019 at [facility] with Dx of Dyspnea  CC attempted to make contact with member, no answer and unable to leave voice message.   Member has a follow-up appointment with PCP: Yes: scheduled on 1/7/2019  Member has had a change in condition: No  New referrals placed: No  Home Visit Needed: No  PCP notified of ED visit via EMR.  Call placed to Dialysis unit, spoke with Althea who stated that member was present for dialysis this morning but had already left. Althea states that it is not uncommon for member to miss dialysis treatments (missed 12/21/19 and 12/23/19).  Inquired if dialysis is arranged for member when she travels to visit her sister [facility]. Althea states that they have assisted with this scheduling twice, but member was a no show for dialysis.    Sakina Carrion RN, BC  Manager [facility] Care Coordinator   [phone]  (Fax)

## 2019-12-26 NOTE — PROGRESS NOTES
[facility] Care Coordination Contact    12/24/2019 Epic notes reviewed. Call placed to [facility] to inquire on status of homecare referral. Informed that calls were made to member twice on 12/20/19 no answer, again on 12/21/19 a message was left with a child to return call, another call made to member's home and a person named Ginette reported that member is out of town visiting her sister and will be back on Monday. [facility] closed case due to inability to contact member.  CC will attempt to reach member early next week.   Sakina Carrion RN, BC  Manager [facility] Care Coordinator   [phone]  (Fax)

## 2019-12-30 ENCOUNTER — PATIENT OUTREACH (OUTPATIENT)
Dept: GERIATRIC MEDICINE | Facility: CLINIC | Age: 74
End: 2019-12-30

## 2019-12-30 NOTE — TELEPHONE ENCOUNTER
Call placed to member's home, person answering the phone (sounded like a young person)  said that Kim was not home. Explained that CC would like to remind Kim of an appt tomorrow, person said that he would tell her    Note that CC did inform member of PCC appt while at care conference on 12/20/2019  Sakina Carrion RN, BC  Manager [facility] Care Coordinator   [phone]  (Fax)

## 2020-01-03 ENCOUNTER — PATIENT OUTREACH (OUTPATIENT)
Dept: GERIATRIC MEDICINE | Facility: CLINIC | Age: 75
End: 2020-01-03

## 2020-01-03 NOTE — PROGRESS NOTES
[facility] Care Coordination Contact    Call placed to [facility], spoke with Mary Jo who states that the renewal application was rec'd on 12/26/19. Mary Jo reports no concerns and will retro MA and Health Care benefits 12/1/19.  Sakina Carrion RN, BC  Manager [facility] Care Coordinator   [phone]  (Fax)

## 2020-01-06 DIAGNOSIS — G89.29 CHRONIC LOW BACK PAIN WITHOUT SCIATICA, UNSPECIFIED BACK PAIN LATERALITY: ICD-10-CM

## 2020-01-06 DIAGNOSIS — M54.50 CHRONIC LOW BACK PAIN WITHOUT SCIATICA, UNSPECIFIED BACK PAIN LATERALITY: ICD-10-CM

## 2020-01-06 RX ORDER — OXYCODONE HYDROCHLORIDE 5 MG/1
5 TABLET ORAL EVERY 8 HOURS PRN
Qty: 90 TABLET | Refills: 0 | Status: ON HOLD | OUTPATIENT
Start: 2020-01-06 | End: 2020-02-01

## 2020-01-06 RX ORDER — OXYCODONE HYDROCHLORIDE 5 MG/1
5 TABLET ORAL EVERY 8 HOURS PRN
Qty: 90 TABLET | Refills: 0 | OUTPATIENT
Start: 2020-01-06

## 2020-01-06 NOTE — TELEPHONE ENCOUNTER
Refilled per PCP after checking via Epic that the order on 12/14 was discontinued & pt did not .  Roxanne Leyva RN

## 2020-01-07 ENCOUNTER — TELEPHONE (OUTPATIENT)
Dept: BEHAVIORAL HEALTH | Facility: CLINIC | Age: 75
End: 2020-01-07

## 2020-01-07 NOTE — TELEPHONE ENCOUNTER
[facility] called patient to follow up on missed appointment. Left a message providing the clinic's number to reschedule.

## 2020-01-10 ENCOUNTER — PATIENT OUTREACH (OUTPATIENT)
Dept: GERIATRIC MEDICINE | Facility: CLINIC | Age: 75
End: 2020-01-10

## 2020-01-10 NOTE — LETTER
January 10, 2020      KIM CHAVEZ  [address]  [place] [address]      Dear Kmi:    At [facility], we are dedicated to improving your health and well-being. Enclosed is the Comprehensive Care Plan that we developed with you on 12/20/2019. Please review the Care Plan carefully.    As a reminder, some of the things we discussed at your visit include:    Your physical and mental health    Ways to reduce falls    Health care needs you may have    Don t forget to contact your care coordinator if you:    Have been hospitalized or plan to be hospitalized     Have had a fall     Have experienced a change in physical health    Are experiencing emotional problems     If you do not agree with your Care Plan, have questions about it, or have experienced a change in your needs, please call me at [phone]. If you are hearing impaired, please call the [place] [employer] at 948 or [phone] (ntqrmp-ze-bfvhxy relay service).    Sincerely,    Sakina Carrion RN    E-mail: [email or web address]  Phone: [phone]      [facility] [place] is a health plan that contracts with both Medicare and the [place] Medical Assistance (Medicaid) program to provide benefits of both programs to enrollees. Enrollment in [facility] depends on contract renewal.    [id number])     [id number] (11/18)

## 2020-01-10 NOTE — PROGRESS NOTES
[facility] Care Coordination Contact    Received after visit chart from care coordinator.  Completed following tasks: Mailed copy of care plan to client and Updated services in access  Chart was returned to CC.    and Provider Signature - No POC Shared:  Member indicates that they do not want their POC shared with any EW providers.     Alisa Griffith  Care Management Specialist  [facility]  [phone]

## 2020-01-16 ENCOUNTER — PATIENT OUTREACH (OUTPATIENT)
Dept: GERIATRIC MEDICINE | Facility: CLINIC | Age: 75
End: 2020-01-16

## 2020-01-16 NOTE — PROGRESS NOTES
[facility] Care Coordination Contact    Rec'd message to contact Pratibha ENG at Dialysis regarding member and concerns with her memory and the need for PCA services.  [phone]    Call placed to Pratibha, explained that CC did see member twice in the month of December and completed an assessment while she was in TCU, member declined any services. Explained that referrals to [facility] were recently placed twice, but homecare closed as they were not able to make contact with member. Explained that when CC met with member, she was indep with all ADL's,  CC offered MOW which member was agreeable to but then would not sign the forms to open her to EW.   Pratibha shared concerns regarding member's memory, that member is not able to complete forms for food support, that family members moved out of her home with the exception of her son who is ill and he cannot help her.   Pratibha states that  does not miss any of her dialysis treatments. Enc'd Pratibha to contact Adult [employer] if she has concerns regarding member.   Explained that CC will make attempts to reach member, stating that if member is agreeable to services, CC will have DHS forms completed and assist.     Call placed to member's home, left vm introducing myself and requesting a return call.  CC followed up with calling member's home several different times.  CC to continue to make attempts to reach member and assist as needed.     Call placed back to Pratibha to share above info.  Pratibha will be meeting with member on 1/23/2020, CC offered to meet with her and member, plan 1/23/2020 @ 9 AM at dialysis unit  [address] [place] [address] Ave [facility]   Sakina Carrion RN, BC  Manager [facility] Care Coordinator   [phone]  (Fax)

## 2020-01-23 ENCOUNTER — PATIENT OUTREACH (OUTPATIENT)
Dept: GERIATRIC MEDICINE | Facility: CLINIC | Age: 75
End: 2020-01-23

## 2020-01-23 NOTE — PROGRESS NOTES
[facility] Care Coordination Contact    Call placed to Pratibha ENG at dialysis unit to confirm care conference meeting with member.  Pratibha stated that member has not shown up for dialysis yet, stating that she may have slept in and sometimes comes late.  Rescheduled meeting for 1/28/2020 @ 9 AM.  Sakina Carrion RN, BC  Manager [facility] Care Coordinator   [phone]  (Fax)

## 2020-01-25 ENCOUNTER — APPOINTMENT (OUTPATIENT)
Dept: GENERAL RADIOLOGY | Facility: CLINIC | Age: 75
DRG: 871 | End: 2020-01-25
Attending: EMERGENCY MEDICINE
Payer: COMMERCIAL

## 2020-01-25 ENCOUNTER — HOSPITAL ENCOUNTER (INPATIENT)
Facility: CLINIC | Age: 75
LOS: 7 days | Discharge: HOME HEALTH | DRG: 871 | End: 2020-02-01
Attending: EMERGENCY MEDICINE | Admitting: INTERNAL MEDICINE
Payer: COMMERCIAL

## 2020-01-25 DIAGNOSIS — I95.3 HYPOTENSION OF HEMODIALYSIS: ICD-10-CM

## 2020-01-25 DIAGNOSIS — E78.5 HYPERLIPIDEMIA LDL GOAL <100: ICD-10-CM

## 2020-01-25 DIAGNOSIS — E11.22 TYPE 2 DIABETES MELLITUS WITH CHRONIC KIDNEY DISEASE, WITHOUT LONG-TERM CURRENT USE OF INSULIN, UNSPECIFIED CKD STAGE (H): ICD-10-CM

## 2020-01-25 DIAGNOSIS — N18.6 END STAGE RENAL DISEASE (H): ICD-10-CM

## 2020-01-25 DIAGNOSIS — K59.01 SLOW TRANSIT CONSTIPATION: ICD-10-CM

## 2020-01-25 DIAGNOSIS — Z99.2 END STAGE RENAL FAILURE ON DIALYSIS (H): ICD-10-CM

## 2020-01-25 DIAGNOSIS — I95.9 ACUTE HYPOTENSION: ICD-10-CM

## 2020-01-25 DIAGNOSIS — N18.6 END STAGE RENAL FAILURE ON DIALYSIS (H): ICD-10-CM

## 2020-01-25 DIAGNOSIS — I12.0 MALIGNANT HYPERTENSIVE KIDNEY DISEASE WITH CHRONIC KIDNEY DISEASE STAGE V OR END STAGE RENAL DISEASE (H): ICD-10-CM

## 2020-01-25 DIAGNOSIS — E87.5 HYPERKALEMIA: ICD-10-CM

## 2020-01-25 DIAGNOSIS — Z99.2 ENCOUNTER FOR EXTRACORPOREAL DIALYSIS (H): ICD-10-CM

## 2020-01-25 DIAGNOSIS — N18.4 CKD (CHRONIC KIDNEY DISEASE) STAGE 4, GFR 15-29 ML/MIN (H): ICD-10-CM

## 2020-01-25 DIAGNOSIS — E87.79 OTHER HYPERVOLEMIA: Primary | ICD-10-CM

## 2020-01-25 DIAGNOSIS — K59.00 CONSTIPATION, UNSPECIFIED CONSTIPATION TYPE: ICD-10-CM

## 2020-01-25 DIAGNOSIS — E03.9 HYPOTHYROIDISM, UNSPECIFIED TYPE: ICD-10-CM

## 2020-01-25 DIAGNOSIS — M54.50 CHRONIC LOW BACK PAIN WITHOUT SCIATICA, UNSPECIFIED BACK PAIN LATERALITY: ICD-10-CM

## 2020-01-25 DIAGNOSIS — F17.210 CIGARETTE SMOKER: ICD-10-CM

## 2020-01-25 DIAGNOSIS — G89.29 CHRONIC LOW BACK PAIN WITHOUT SCIATICA, UNSPECIFIED BACK PAIN LATERALITY: ICD-10-CM

## 2020-01-25 PROBLEM — E87.70 FLUID OVERLOAD: Status: ACTIVE | Noted: 2020-01-25

## 2020-01-25 LAB
ANION GAP SERPL CALCULATED.3IONS-SCNC: 8 MMOL/L (ref 3–14)
ANION GAP SERPL CALCULATED.3IONS-SCNC: 9 MMOL/L (ref 3–14)
BASE DEFICIT BLDV-SCNC: 2.7 MMOL/L
BASOPHILS # BLD AUTO: 0.1 10E9/L (ref 0–0.2)
BASOPHILS NFR BLD AUTO: 1.1 %
BUN SERPL-MCNC: 66 MG/DL (ref 7–30)
BUN SERPL-MCNC: 70 MG/DL (ref 7–30)
CA-I BLD-SCNC: 4.2 MG/DL (ref 4.4–5.2)
CA-I BLD-SCNC: 5.1 MG/DL (ref 4.4–5.2)
CA-I SERPL ISE-MCNC: 4.2 MG/DL (ref 4.4–5.2)
CALCIUM SERPL-MCNC: 7.9 MG/DL (ref 8.5–10.1)
CALCIUM SERPL-MCNC: 8.4 MG/DL (ref 8.5–10.1)
CHLORIDE SERPL-SCNC: 101 MMOL/L (ref 94–109)
CHLORIDE SERPL-SCNC: 104 MMOL/L (ref 94–109)
CO2 BLDCOV-SCNC: 26 MMOL/L (ref 21–28)
CO2 BLDCOV-SCNC: 28 MMOL/L (ref 21–28)
CO2 SERPL-SCNC: 24 MMOL/L (ref 20–32)
CO2 SERPL-SCNC: 26 MMOL/L (ref 20–32)
CORTIS SERPL-MCNC: 11.2 UG/DL (ref 4–22)
CREAT SERPL-MCNC: 7.44 MG/DL (ref 0.52–1.04)
CREAT SERPL-MCNC: 7.48 MG/DL (ref 0.52–1.04)
DIFFERENTIAL METHOD BLD: ABNORMAL
EOSINOPHIL # BLD AUTO: 0.6 10E9/L (ref 0–0.7)
EOSINOPHIL NFR BLD AUTO: 6.3 %
ERYTHROCYTE [DISTWIDTH] IN BLOOD BY AUTOMATED COUNT: 16 % (ref 10–15)
FLUAV+FLUBV AG SPEC QL: NEGATIVE
FLUAV+FLUBV AG SPEC QL: NEGATIVE
GFR SERPL CREATININE-BSD FRML MDRD: 5 ML/MIN/{1.73_M2}
GFR SERPL CREATININE-BSD FRML MDRD: 5 ML/MIN/{1.73_M2}
GLUCOSE BLD-MCNC: 142 MG/DL (ref 70–99)
GLUCOSE BLD-MCNC: 97 MG/DL (ref 70–99)
GLUCOSE BLDC GLUCOMTR-MCNC: 101 MG/DL (ref 70–99)
GLUCOSE BLDC GLUCOMTR-MCNC: 115 MG/DL (ref 70–99)
GLUCOSE BLDC GLUCOMTR-MCNC: 126 MG/DL (ref 70–99)
GLUCOSE BLDC GLUCOMTR-MCNC: 129 MG/DL (ref 70–99)
GLUCOSE BLDC GLUCOMTR-MCNC: 130 MG/DL (ref 70–99)
GLUCOSE BLDC GLUCOMTR-MCNC: 136 MG/DL (ref 70–99)
GLUCOSE BLDC GLUCOMTR-MCNC: 142 MG/DL (ref 70–99)
GLUCOSE BLDC GLUCOMTR-MCNC: 143 MG/DL (ref 70–99)
GLUCOSE BLDC GLUCOMTR-MCNC: 144 MG/DL (ref 70–99)
GLUCOSE BLDC GLUCOMTR-MCNC: 150 MG/DL (ref 70–99)
GLUCOSE BLDC GLUCOMTR-MCNC: 153 MG/DL (ref 70–99)
GLUCOSE BLDC GLUCOMTR-MCNC: 177 MG/DL (ref 70–99)
GLUCOSE BLDC GLUCOMTR-MCNC: 204 MG/DL (ref 70–99)
GLUCOSE BLDC GLUCOMTR-MCNC: 206 MG/DL (ref 70–99)
GLUCOSE BLDC GLUCOMTR-MCNC: 208 MG/DL (ref 70–99)
GLUCOSE SERPL-MCNC: 108 MG/DL (ref 70–99)
GLUCOSE SERPL-MCNC: 148 MG/DL (ref 70–99)
HCO3 BLDV-SCNC: 24 MMOL/L (ref 21–28)
HCT VFR BLD AUTO: 39.2 % (ref 35–47)
HCT VFR BLD CALC: 35 %PCV (ref 35–47)
HCT VFR BLD CALC: 37 %PCV (ref 35–47)
HGB BLD CALC-MCNC: 11.9 G/DL (ref 11.7–15.7)
HGB BLD CALC-MCNC: 12.6 G/DL (ref 11.7–15.7)
HGB BLD-MCNC: 11.9 G/DL (ref 11.7–15.7)
IMM GRANULOCYTES # BLD: 0.1 10E9/L (ref 0–0.4)
IMM GRANULOCYTES NFR BLD: 0.9 %
INTERPRETATION ECG - MUSE: NORMAL
LACTATE BLD-SCNC: 1.5 MMOL/L (ref 0.7–2)
LACTATE BLD-SCNC: 1.5 MMOL/L (ref 0.7–2)
LACTATE BLD-SCNC: NORMAL MMOL/L (ref 0.7–2)
LYMPHOCYTES # BLD AUTO: 1.1 10E9/L (ref 0.8–5.3)
LYMPHOCYTES NFR BLD AUTO: 11.4 %
MCH RBC QN AUTO: 31.7 PG (ref 26.5–33)
MCHC RBC AUTO-ENTMCNC: 30.4 G/DL (ref 31.5–36.5)
MCV RBC AUTO: 105 FL (ref 78–100)
MONOCYTES # BLD AUTO: 0.5 10E9/L (ref 0–1.3)
MONOCYTES NFR BLD AUTO: 5 %
MRSA DNA SPEC QL NAA+PROBE: NEGATIVE
NEUTROPHILS # BLD AUTO: 7.1 10E9/L (ref 1.6–8.3)
NEUTROPHILS NFR BLD AUTO: 75.3 %
NRBC # BLD AUTO: 0 10*3/UL
NRBC BLD AUTO-RTO: 0 /100
O2/TOTAL GAS SETTING VFR VENT: 21 %
OXYHGB MFR BLDV: 75 %
PCO2 BLDV: 48 MM HG (ref 40–50)
PCO2 BLDV: 50 MM HG (ref 40–50)
PCO2 BLDV: 59 MM HG (ref 40–50)
PH BLDV: 7.28 PH (ref 7.32–7.43)
PH BLDV: 7.29 PH (ref 7.32–7.43)
PH BLDV: 7.33 PH (ref 7.32–7.43)
PLATELET # BLD AUTO: 279 10E9/L (ref 150–450)
PO2 BLDV: 32 MM HG (ref 25–47)
PO2 BLDV: 46 MM HG (ref 25–47)
PO2 BLDV: 49 MM HG (ref 25–47)
POTASSIUM BLD-SCNC: 6.2 MMOL/L (ref 3.4–5.3)
POTASSIUM BLD-SCNC: 6.4 MMOL/L (ref 3.4–5.3)
POTASSIUM SERPL-SCNC: 5.7 MMOL/L (ref 3.4–5.3)
POTASSIUM SERPL-SCNC: 5.7 MMOL/L (ref 3.4–5.3)
POTASSIUM SERPL-SCNC: 6.1 MMOL/L (ref 3.4–5.3)
POTASSIUM SERPL-SCNC: 6.3 MMOL/L (ref 3.4–5.3)
RBC # BLD AUTO: 3.75 10E12/L (ref 3.8–5.2)
SAO2 % BLDV FROM PO2: 53 %
SAO2 % BLDV FROM PO2: 81 %
SODIUM BLD-SCNC: 134 MMOL/L (ref 133–144)
SODIUM BLD-SCNC: 135 MMOL/L (ref 133–144)
SODIUM SERPL-SCNC: 134 MMOL/L (ref 133–144)
SODIUM SERPL-SCNC: 137 MMOL/L (ref 133–144)
SPECIMEN SOURCE: NORMAL
SPECIMEN SOURCE: NORMAL
T4 FREE SERPL-MCNC: 0.23 NG/DL (ref 0.76–1.46)
TROPONIN I BLD-MCNC: 0.03 UG/L (ref 0–0.08)
TSH SERPL DL<=0.005 MIU/L-ACNC: 97.2 MU/L (ref 0.4–4)
WBC # BLD AUTO: 9.4 10E9/L (ref 4–11)

## 2020-01-25 PROCEDURE — 96375 TX/PRO/DX INJ NEW DRUG ADDON: CPT | Performed by: EMERGENCY MEDICINE

## 2020-01-25 PROCEDURE — 93005 ELECTROCARDIOGRAM TRACING: CPT | Mod: 76 | Performed by: EMERGENCY MEDICINE

## 2020-01-25 PROCEDURE — 99292 CRITICAL CARE ADDL 30 MIN: CPT | Mod: 25 | Performed by: EMERGENCY MEDICINE

## 2020-01-25 PROCEDURE — 25800030 ZZH RX IP 258 OP 636: Performed by: EMERGENCY MEDICINE

## 2020-01-25 PROCEDURE — 96365 THER/PROPH/DIAG IV INF INIT: CPT | Performed by: EMERGENCY MEDICINE

## 2020-01-25 PROCEDURE — 25800030 ZZH RX IP 258 OP 636: Performed by: INTERNAL MEDICINE

## 2020-01-25 PROCEDURE — 71045 X-RAY EXAM CHEST 1 VIEW: CPT

## 2020-01-25 PROCEDURE — 82805 BLOOD GASES W/O2 SATURATION: CPT | Performed by: INTERNAL MEDICINE

## 2020-01-25 PROCEDURE — 3E033XZ INTRODUCTION OF VASOPRESSOR INTO PERIPHERAL VEIN, PERCUTANEOUS APPROACH: ICD-10-PCS | Performed by: EMERGENCY MEDICINE

## 2020-01-25 PROCEDURE — 25000128 H RX IP 250 OP 636: Performed by: INTERNAL MEDICINE

## 2020-01-25 PROCEDURE — 99292 CRITICAL CARE ADDL 30 MIN: CPT | Performed by: EMERGENCY MEDICINE

## 2020-01-25 PROCEDURE — 99207 ZZC CDG-HISTORY COMP: MEETS EXP. PROBLEM FOCUSED - DOWN CODED LACK OF HPI: CPT | Performed by: INTERNAL MEDICINE

## 2020-01-25 PROCEDURE — 84484 ASSAY OF TROPONIN QUANT: CPT

## 2020-01-25 PROCEDURE — 84132 ASSAY OF SERUM POTASSIUM: CPT | Performed by: EMERGENCY MEDICINE

## 2020-01-25 PROCEDURE — 90947 DIALYSIS REPEATED EVAL: CPT

## 2020-01-25 PROCEDURE — 87040 BLOOD CULTURE FOR BACTERIA: CPT | Performed by: STUDENT IN AN ORGANIZED HEALTH CARE EDUCATION/TRAINING PROGRAM

## 2020-01-25 PROCEDURE — 25000128 H RX IP 250 OP 636: Performed by: STUDENT IN AN ORGANIZED HEALTH CARE EDUCATION/TRAINING PROGRAM

## 2020-01-25 PROCEDURE — 93005 ELECTROCARDIOGRAM TRACING: CPT | Performed by: EMERGENCY MEDICINE

## 2020-01-25 PROCEDURE — 84480 ASSAY TRIIODOTHYRONINE (T3): CPT | Performed by: EMERGENCY MEDICINE

## 2020-01-25 PROCEDURE — 96367 TX/PROPH/DG ADDL SEQ IV INF: CPT | Performed by: EMERGENCY MEDICINE

## 2020-01-25 PROCEDURE — 83605 ASSAY OF LACTIC ACID: CPT | Performed by: EMERGENCY MEDICINE

## 2020-01-25 PROCEDURE — 80048 BASIC METABOLIC PNL TOTAL CA: CPT | Performed by: EMERGENCY MEDICINE

## 2020-01-25 PROCEDURE — 25000125 ZZHC RX 250: Performed by: STUDENT IN AN ORGANIZED HEALTH CARE EDUCATION/TRAINING PROGRAM

## 2020-01-25 PROCEDURE — 94640 AIRWAY INHALATION TREATMENT: CPT | Performed by: EMERGENCY MEDICINE

## 2020-01-25 PROCEDURE — 85025 COMPLETE CBC W/AUTO DIFF WBC: CPT | Performed by: EMERGENCY MEDICINE

## 2020-01-25 PROCEDURE — 93010 ELECTROCARDIOGRAM REPORT: CPT | Mod: 59 | Performed by: EMERGENCY MEDICINE

## 2020-01-25 PROCEDURE — 20000004 ZZH R&B ICU UMMC

## 2020-01-25 PROCEDURE — 40000498 ZZHCL STATISTIC POTASSIUM ED POCT

## 2020-01-25 PROCEDURE — 82330 ASSAY OF CALCIUM: CPT

## 2020-01-25 PROCEDURE — 25000128 H RX IP 250 OP 636: Performed by: EMERGENCY MEDICINE

## 2020-01-25 PROCEDURE — 25800025 ZZH RX 258: Performed by: STUDENT IN AN ORGANIZED HEALTH CARE EDUCATION/TRAINING PROGRAM

## 2020-01-25 PROCEDURE — 25000132 ZZH RX MED GY IP 250 OP 250 PS 637: Performed by: EMERGENCY MEDICINE

## 2020-01-25 PROCEDURE — 00000146 ZZHCL STATISTIC GLUCOSE BY METER IP

## 2020-01-25 PROCEDURE — 96361 HYDRATE IV INFUSION ADD-ON: CPT | Performed by: EMERGENCY MEDICINE

## 2020-01-25 PROCEDURE — 25800025 ZZH RX 258: Performed by: EMERGENCY MEDICINE

## 2020-01-25 PROCEDURE — 25000125 ZZHC RX 250

## 2020-01-25 PROCEDURE — 25000132 ZZH RX MED GY IP 250 OP 250 PS 637: Performed by: INTERNAL MEDICINE

## 2020-01-25 PROCEDURE — 99232 SBSQ HOSP IP/OBS MODERATE 35: CPT | Performed by: INTERNAL MEDICINE

## 2020-01-25 PROCEDURE — 93010 ELECTROCARDIOGRAM REPORT: CPT | Mod: 76 | Performed by: EMERGENCY MEDICINE

## 2020-01-25 PROCEDURE — 5A1D90Z PERFORMANCE OF URINARY FILTRATION, CONTINUOUS, GREATER THAN 18 HOURS PER DAY: ICD-10-PCS | Performed by: EMERGENCY MEDICINE

## 2020-01-25 PROCEDURE — 82330 ASSAY OF CALCIUM: CPT | Performed by: INTERNAL MEDICINE

## 2020-01-25 PROCEDURE — 40000497 ZZHCL STATISTIC SODIUM ED POCT

## 2020-01-25 PROCEDURE — 93308 TTE F-UP OR LMTD: CPT | Mod: 26 | Performed by: EMERGENCY MEDICINE

## 2020-01-25 PROCEDURE — 84443 ASSAY THYROID STIM HORMONE: CPT | Performed by: EMERGENCY MEDICINE

## 2020-01-25 PROCEDURE — 40000502 ZZHCL STATISTIC GLUCOSE ED POCT

## 2020-01-25 PROCEDURE — 99291 CRITICAL CARE FIRST HOUR: CPT | Mod: GC | Performed by: INTERNAL MEDICINE

## 2020-01-25 PROCEDURE — 80048 BASIC METABOLIC PNL TOTAL CA: CPT | Performed by: INTERNAL MEDICINE

## 2020-01-25 PROCEDURE — 25000132 ZZH RX MED GY IP 250 OP 250 PS 637: Performed by: STUDENT IN AN ORGANIZED HEALTH CARE EDUCATION/TRAINING PROGRAM

## 2020-01-25 PROCEDURE — 96376 TX/PRO/DX INJ SAME DRUG ADON: CPT | Performed by: EMERGENCY MEDICINE

## 2020-01-25 PROCEDURE — 96368 THER/DIAG CONCURRENT INF: CPT | Performed by: EMERGENCY MEDICINE

## 2020-01-25 PROCEDURE — 84439 ASSAY OF FREE THYROXINE: CPT | Performed by: EMERGENCY MEDICINE

## 2020-01-25 PROCEDURE — 99291 CRITICAL CARE FIRST HOUR: CPT | Mod: 25 | Performed by: EMERGENCY MEDICINE

## 2020-01-25 PROCEDURE — 40000501 ZZHCL STATISTIC HEMATOCRIT ED POCT

## 2020-01-25 PROCEDURE — 87040 BLOOD CULTURE FOR BACTERIA: CPT | Performed by: EMERGENCY MEDICINE

## 2020-01-25 PROCEDURE — 25000125 ZZHC RX 250: Performed by: EMERGENCY MEDICINE

## 2020-01-25 PROCEDURE — 87640 STAPH A DNA AMP PROBE: CPT | Performed by: STUDENT IN AN ORGANIZED HEALTH CARE EDUCATION/TRAINING PROGRAM

## 2020-01-25 PROCEDURE — 82803 BLOOD GASES ANY COMBINATION: CPT

## 2020-01-25 PROCEDURE — 96366 THER/PROPH/DIAG IV INF ADDON: CPT | Performed by: EMERGENCY MEDICINE

## 2020-01-25 PROCEDURE — 93308 TTE F-UP OR LMTD: CPT | Performed by: EMERGENCY MEDICINE

## 2020-01-25 PROCEDURE — 82533 TOTAL CORTISOL: CPT | Performed by: EMERGENCY MEDICINE

## 2020-01-25 PROCEDURE — 87804 INFLUENZA ASSAY W/OPTIC: CPT | Performed by: EMERGENCY MEDICINE

## 2020-01-25 PROCEDURE — 87641 MR-STAPH DNA AMP PROBE: CPT | Performed by: STUDENT IN AN ORGANIZED HEALTH CARE EDUCATION/TRAINING PROGRAM

## 2020-01-25 RX ORDER — ALBUTEROL SULFATE 0.83 MG/ML
2.5 SOLUTION RESPIRATORY (INHALATION) ONCE
Status: COMPLETED | OUTPATIENT
Start: 2020-01-25 | End: 2020-01-25

## 2020-01-25 RX ORDER — HEPARIN SODIUM 5000 [USP'U]/.5ML
5000 INJECTION, SOLUTION INTRAVENOUS; SUBCUTANEOUS EVERY 12 HOURS
Status: DISCONTINUED | OUTPATIENT
Start: 2020-01-25 | End: 2020-02-01 | Stop reason: HOSPADM

## 2020-01-25 RX ORDER — LEVOTHYROXINE SODIUM ANHYDROUS 100 UG/5ML
150 INJECTION, POWDER, LYOPHILIZED, FOR SOLUTION INTRAVENOUS DAILY
Status: DISCONTINUED | OUTPATIENT
Start: 2020-01-26 | End: 2020-01-25

## 2020-01-25 RX ORDER — ALBUTEROL SULFATE 90 UG/1
2 AEROSOL, METERED RESPIRATORY (INHALATION) EVERY 6 HOURS PRN
Status: DISCONTINUED | OUTPATIENT
Start: 2020-01-25 | End: 2020-02-01 | Stop reason: HOSPADM

## 2020-01-25 RX ORDER — NALOXONE HYDROCHLORIDE 0.4 MG/ML
.1-.4 INJECTION, SOLUTION INTRAMUSCULAR; INTRAVENOUS; SUBCUTANEOUS
Status: DISCONTINUED | OUTPATIENT
Start: 2020-01-25 | End: 2020-01-28

## 2020-01-25 RX ORDER — CALCIUM GLUCONATE 94 MG/ML
1 INJECTION, SOLUTION INTRAVENOUS ONCE
Status: COMPLETED | OUTPATIENT
Start: 2020-01-25 | End: 2020-01-25

## 2020-01-25 RX ORDER — NOREPINEPHRINE BITARTRATE 0.06 MG/ML
0.03-0.4 INJECTION, SOLUTION INTRAVENOUS CONTINUOUS
Status: DISCONTINUED | OUTPATIENT
Start: 2020-01-25 | End: 2020-01-28

## 2020-01-25 RX ORDER — DEXTROSE MONOHYDRATE 25 G/50ML
25 INJECTION, SOLUTION INTRAVENOUS ONCE
Status: COMPLETED | OUTPATIENT
Start: 2020-01-25 | End: 2020-01-25

## 2020-01-25 RX ORDER — AMOXICILLIN 250 MG
1 CAPSULE ORAL 2 TIMES DAILY
Status: DISCONTINUED | OUTPATIENT
Start: 2020-01-25 | End: 2020-02-01 | Stop reason: HOSPADM

## 2020-01-25 RX ORDER — TIOTROPIUM BROMIDE 18 UG/1
18 CAPSULE ORAL; RESPIRATORY (INHALATION) DAILY
Status: DISCONTINUED | OUTPATIENT
Start: 2020-01-25 | End: 2020-01-25 | Stop reason: CLARIF

## 2020-01-25 RX ORDER — PIPERACILLIN SODIUM, TAZOBACTAM SODIUM 2; .25 G/10ML; G/10ML
2.25 INJECTION, POWDER, LYOPHILIZED, FOR SOLUTION INTRAVENOUS ONCE
Status: COMPLETED | OUTPATIENT
Start: 2020-01-25 | End: 2020-01-25

## 2020-01-25 RX ORDER — ACETAMINOPHEN 325 MG/1
650 TABLET ORAL EVERY 4 HOURS PRN
Status: DISCONTINUED | OUTPATIENT
Start: 2020-01-25 | End: 2020-02-01 | Stop reason: HOSPADM

## 2020-01-25 RX ORDER — LIDOCAINE 40 MG/G
CREAM TOPICAL
Status: DISCONTINUED | OUTPATIENT
Start: 2020-01-25 | End: 2020-02-01 | Stop reason: HOSPADM

## 2020-01-25 RX ORDER — OXYCODONE HYDROCHLORIDE 5 MG/1
5 TABLET ORAL EVERY 8 HOURS PRN
Status: DISCONTINUED | OUTPATIENT
Start: 2020-01-25 | End: 2020-01-28

## 2020-01-25 RX ORDER — VITAMIN B COMPLEX
2000 TABLET ORAL DAILY
Status: DISCONTINUED | OUTPATIENT
Start: 2020-01-25 | End: 2020-02-01 | Stop reason: HOSPADM

## 2020-01-25 RX ORDER — NOREPINEPHRINE BITARTRATE 0.06 MG/ML
INJECTION, SOLUTION INTRAVENOUS
Status: COMPLETED
Start: 2020-01-25 | End: 2020-01-25

## 2020-01-25 RX ORDER — LEVOTHYROXINE SODIUM 100 UG/1
200 TABLET ORAL DAILY
Status: DISCONTINUED | OUTPATIENT
Start: 2020-01-25 | End: 2020-01-25

## 2020-01-25 RX ORDER — ASPIRIN 81 MG/1
81 TABLET, CHEWABLE ORAL DAILY
Status: DISCONTINUED | OUTPATIENT
Start: 2020-01-25 | End: 2020-02-01 | Stop reason: HOSPADM

## 2020-01-25 RX ORDER — ATORVASTATIN CALCIUM 40 MG/1
40 TABLET, FILM COATED ORAL AT BEDTIME
Status: DISCONTINUED | OUTPATIENT
Start: 2020-01-25 | End: 2020-02-01 | Stop reason: HOSPADM

## 2020-01-25 RX ORDER — LEVOTHYROXINE SODIUM 100 UG/1
200 TABLET ORAL
Status: DISCONTINUED | OUTPATIENT
Start: 2020-01-26 | End: 2020-01-26

## 2020-01-25 RX ORDER — POTASSIUM CHLORIDE 29.8 MG/ML
20 INJECTION INTRAVENOUS EVERY 6 HOURS PRN
Status: DISCONTINUED | OUTPATIENT
Start: 2020-01-25 | End: 2020-01-26

## 2020-01-25 RX ORDER — DOCUSATE SODIUM 100 MG/1
200 CAPSULE, LIQUID FILLED ORAL DAILY
Status: DISCONTINUED | OUTPATIENT
Start: 2020-01-25 | End: 2020-02-01 | Stop reason: HOSPADM

## 2020-01-25 RX ORDER — NITROGLYCERIN 0.4 MG/1
0.4 TABLET SUBLINGUAL EVERY 5 MIN PRN
Status: DISCONTINUED | OUTPATIENT
Start: 2020-01-25 | End: 2020-01-25

## 2020-01-25 RX ORDER — POLYETHYLENE GLYCOL 3350 17 G/17G
17 POWDER, FOR SOLUTION ORAL DAILY PRN
Status: DISCONTINUED | OUTPATIENT
Start: 2020-01-25 | End: 2020-02-01 | Stop reason: HOSPADM

## 2020-01-25 RX ORDER — EMOLLIENT BASE
CREAM (GRAM) TOPICAL DAILY
Status: DISCONTINUED | OUTPATIENT
Start: 2020-01-25 | End: 2020-02-01 | Stop reason: HOSPADM

## 2020-01-25 RX ORDER — DEXTROSE MONOHYDRATE 100 MG/ML
INJECTION, SOLUTION INTRAVENOUS CONTINUOUS
Status: DISPENSED | OUTPATIENT
Start: 2020-01-25 | End: 2020-01-25

## 2020-01-25 RX ORDER — DEXTROSE MONOHYDRATE 100 MG/ML
INJECTION, SOLUTION INTRAVENOUS CONTINUOUS
Status: DISCONTINUED | OUTPATIENT
Start: 2020-01-25 | End: 2020-01-26

## 2020-01-25 RX ORDER — DEXTROSE MONOHYDRATE 25 G/50ML
25-50 INJECTION, SOLUTION INTRAVENOUS
Status: DISCONTINUED | OUTPATIENT
Start: 2020-01-25 | End: 2020-02-01 | Stop reason: HOSPADM

## 2020-01-25 RX ORDER — AMOXICILLIN 250 MG
2 CAPSULE ORAL 2 TIMES DAILY
Status: DISCONTINUED | OUTPATIENT
Start: 2020-01-25 | End: 2020-02-01 | Stop reason: HOSPADM

## 2020-01-25 RX ORDER — NALOXONE HYDROCHLORIDE 0.4 MG/ML
.1-.4 INJECTION, SOLUTION INTRAMUSCULAR; INTRAVENOUS; SUBCUTANEOUS
Status: DISCONTINUED | OUTPATIENT
Start: 2020-01-25 | End: 2020-02-01 | Stop reason: HOSPADM

## 2020-01-25 RX ORDER — NICOTINE POLACRILEX 4 MG
15-30 LOZENGE BUCCAL
Status: DISCONTINUED | OUTPATIENT
Start: 2020-01-25 | End: 2020-02-01 | Stop reason: HOSPADM

## 2020-01-25 RX ORDER — CALCIUM CHLORIDE 100 MG/ML
INJECTION INTRAVENOUS; INTRAVENTRICULAR
Status: DISCONTINUED
Start: 2020-01-25 | End: 2020-01-25 | Stop reason: HOSPADM

## 2020-01-25 RX ADMIN — DEXTROSE MONOHYDRATE: 100 INJECTION, SOLUTION INTRAVENOUS at 23:05

## 2020-01-25 RX ADMIN — NOREPINEPHRINE BITARTRATE 0.05 MCG/KG/MIN: 0.06 INJECTION, SOLUTION INTRAVENOUS at 14:30

## 2020-01-25 RX ADMIN — DEXTROSE MONOHYDRATE: 100 INJECTION, SOLUTION INTRAVENOUS at 09:44

## 2020-01-25 RX ADMIN — SODIUM CHLORIDE 500 ML: 9 INJECTION, SOLUTION INTRAVENOUS at 13:21

## 2020-01-25 RX ADMIN — OXYCODONE HYDROCHLORIDE 5 MG: 5 TABLET ORAL at 20:12

## 2020-01-25 RX ADMIN — HUMAN INSULIN 6.4 UNITS: 100 INJECTION, SOLUTION SUBCUTANEOUS at 09:43

## 2020-01-25 RX ADMIN — Medication 5000 UNITS: at 12:02

## 2020-01-25 RX ADMIN — DOCUSATE SODIUM 200 MG: 100 CAPSULE, LIQUID FILLED ORAL at 11:57

## 2020-01-25 RX ADMIN — ALBUTEROL SULFATE 2.5 MG: 2.5 SOLUTION RESPIRATORY (INHALATION) at 12:59

## 2020-01-25 RX ADMIN — CALCIUM GLUCONATE 1 G: 98 INJECTION, SOLUTION INTRAVENOUS at 12:57

## 2020-01-25 RX ADMIN — MELATONIN 2000 UNITS: at 11:59

## 2020-01-25 RX ADMIN — DEXTROSE 50 % IN WATER (D50W) INTRAVENOUS SYRINGE 25 G: at 09:37

## 2020-01-25 RX ADMIN — HUMAN INSULIN 6.4 UNITS: 100 INJECTION, SOLUTION SUBCUTANEOUS at 13:25

## 2020-01-25 RX ADMIN — PIPERACILLIN SODIUM AND TAZOBACTAM SODIUM 2.25 G: 2; .25 INJECTION, POWDER, LYOPHILIZED, FOR SOLUTION INTRAVENOUS at 13:26

## 2020-01-25 RX ADMIN — CALCIUM CHLORIDE, MAGNESIUM CHLORIDE, SODIUM CHLORIDE, SODIUM BICARBONATE, POTASSIUM CHLORIDE AND SODIUM PHOSPHATE DIBASIC DIHYDRATE 12.5 ML/KG/HR: 3.68; 3.05; 6.34; 3.09; .314; .187 INJECTION INTRAVENOUS at 17:39

## 2020-01-25 RX ADMIN — FLUTICASONE PROPIONATE AND SALMETEROL 1 PUFF: 50; 250 POWDER RESPIRATORY (INHALATION) at 12:02

## 2020-01-25 RX ADMIN — CALCIUM CHLORIDE, MAGNESIUM CHLORIDE, SODIUM CHLORIDE, SODIUM BICARBONATE, POTASSIUM CHLORIDE AND SODIUM PHOSPHATE DIBASIC DIHYDRATE 3.15 ML/KG/HR: 3.68; 3.05; 6.34; 3.09; .314; .187 INJECTION INTRAVENOUS at 17:38

## 2020-01-25 RX ADMIN — CALCIUM GLUCONATE 1 G: 98 INJECTION, SOLUTION INTRAVENOUS at 14:28

## 2020-01-25 RX ADMIN — DEXTROSE 50 % IN WATER (D50W) INTRAVENOUS SYRINGE 25 G: at 13:25

## 2020-01-25 RX ADMIN — ALBUTEROL SULFATE 2.5 MG: 2.5 SOLUTION RESPIRATORY (INHALATION) at 09:45

## 2020-01-25 RX ADMIN — Medication 1 CAPSULE: at 12:02

## 2020-01-25 RX ADMIN — ACETAMINOPHEN 650 MG: 325 TABLET, FILM COATED ORAL at 20:11

## 2020-01-25 RX ADMIN — VANCOMYCIN HYDROCHLORIDE 1500 MG: 10 INJECTION, POWDER, LYOPHILIZED, FOR SOLUTION INTRAVENOUS at 14:37

## 2020-01-25 RX ADMIN — ASPIRIN 81 MG CHEWABLE TABLET 81 MG: 81 TABLET CHEWABLE at 11:57

## 2020-01-25 RX ADMIN — SODIUM CHLORIDE 250 ML: 9 INJECTION, SOLUTION INTRAVENOUS at 14:03

## 2020-01-25 RX ADMIN — UMECLIDINIUM 1 PUFF: 62.5 AEROSOL, POWDER ORAL at 12:02

## 2020-01-25 RX ADMIN — Medication: at 12:01

## 2020-01-25 RX ADMIN — FLUTICASONE PROPIONATE AND SALMETEROL 1 PUFF: 50; 250 POWDER RESPIRATORY (INHALATION) at 22:04

## 2020-01-25 RX ADMIN — SENNOSIDES AND DOCUSATE SODIUM 1 TABLET: 8.6; 5 TABLET ORAL at 20:11

## 2020-01-25 RX ADMIN — ATORVASTATIN CALCIUM 40 MG: 40 TABLET, FILM COATED ORAL at 22:04

## 2020-01-25 RX ADMIN — Medication 0.05 MCG/KG/MIN: at 14:30

## 2020-01-25 RX ADMIN — Medication 5000 UNITS: at 22:04

## 2020-01-25 RX ADMIN — SENNOSIDES AND DOCUSATE SODIUM 1 TABLET: 8.6; 5 TABLET ORAL at 11:58

## 2020-01-25 RX ADMIN — LEVOTHYROXINE SODIUM 200 MCG: 200 TABLET ORAL at 11:58

## 2020-01-25 ASSESSMENT — ENCOUNTER SYMPTOMS
SHORTNESS OF BREATH: 0
ABDOMINAL PAIN: 0
VOMITING: 0
NAUSEA: 0
WEAKNESS: 1

## 2020-01-25 ASSESSMENT — PAIN DESCRIPTION - DESCRIPTORS: DESCRIPTORS: PINS AND NEEDLES;SHOOTING

## 2020-01-25 ASSESSMENT — MIFFLIN-ST. JEOR: SCORE: 1220.5

## 2020-01-25 ASSESSMENT — ACTIVITIES OF DAILY LIVING (ADL): ADLS_ACUITY_SCORE: 17
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
## 2020-01-25 NOTE — ED NOTES
Dr. Hernandez called to bedside for low BP (60/45). 250mL bolus given and patient transferred to  2. Care hand off to Kallie BROWN

## 2020-01-25 NOTE — LETTER
Health Information Management Services               [facility]      Sender:  Stacia Cesar, [phone]        Date: January 31, 2020  Patient Name:  Kim Anne  Routing Message:    TCU referral        The documents accompanying this notice contain confidential information belonging to the sender.  This information is intended only for the use of the individual or entity named above.  The authorized recipient of this information is prohibited from disclosing this information to any other party and is required to destroy the information after its stated need has been fulfilled, unless otherwise required by state law.      If you are not the intended recipient, you are hereby notified that any disclosure, copy, distribution or action taken in reliance on the contents of these documents is strictly prohibited.  If you have received this document in error, please return it by fax to [phone] with a note on the cover sheet explaining why you are returning it (e.g. not your patient, not your provider, etc.).  If you need further assistance, please call [facility] [employer] Centralized Transcription at [phone].  Documents may also be returned by mail to [employer], , [facility] [address], [place], [place] [address].

## 2020-01-25 NOTE — ED NOTES
Bed: ED22  Expected date:   Expected time:   Means of arrival:   Comments:  H423  74F: weakness, hypotension  ETA: 0734am  YELLOW

## 2020-01-25 NOTE — PHARMACY-VANCOMYCIN DOSING SERVICE
Pharmacy Vancomycin Initial Note  Date of Service 2020  Patient's  1945  74 year old, female    Indication: Sepsis    Current estimated CrCl = Estimated Creatinine Clearance: 6.7 mL/min (A) (based on SCr of 7.44 mg/dL (H)).    Creatinine for last 3 days  2020:  8:15 AM Creatinine 7.44 mg/dL    Recent Vancomycin Level(s) for last 3 days  No results found for requested labs within last 72 hours.      Vancomycin IV Administrations (past 72 hours)      No vancomycin orders with administrations in past 72 hours.                Nephrotoxins and other renal medications (From now, onward)    Start     Dose/Rate Route Frequency Ordered Stop    20 1500  vancomycin 1500 mg in 0.9% NaCl 250 ml intermittent infusion 1,500 mg      1,500 mg  over 90 Minutes Intravenous ONCE 20 1404      20 1404  vancomycin place wolfe - receiving intermittent dosing      1 each Intravenous SEE ADMIN INSTRUCTIONS 20 1404      20 1248  piperacillin-tazobactam (ZOSYN) 2.25 g vial to attach to  ml bag      2.25 g  over 1 Hours Intravenous ONCE 20 1247            Contrast Orders - past 72 hours (72h ago, onward)    None                Plan:  1.  Start vancomycin  1500 mg IV x1 (~25 mg/kg), followed by intermittent renal dosing  2.  Goal Trough Level: 15-20 mg/L   3.  Pharmacy will check trough levels as appropriate in 1-3 Days.    4. Serum creatinine levels will be ordered daily for the first week of therapy and at least twice weekly for subsequent weeks.    5. [place] method utilized to dose vancomycin therapy: Method 2    Ricardo Maya, Pharm.D.        .

## 2020-01-25 NOTE — ED TRIAGE NOTES
Pt BIBA from dialysis for generalized weakness and BP down to 70s/40s about 20 min into her dialysis run. Missed dialysis on Thursday.
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
## 2020-01-26 ENCOUNTER — APPOINTMENT (OUTPATIENT)
Dept: GENERAL RADIOLOGY | Facility: CLINIC | Age: 75
DRG: 871 | End: 2020-01-26
Attending: NURSE PRACTITIONER
Payer: COMMERCIAL

## 2020-01-26 LAB
ALBUMIN SERPL-MCNC: 2.5 G/DL (ref 3.4–5)
ALBUMIN SERPL-MCNC: NORMAL G/DL (ref 3.4–5)
ALP SERPL-CCNC: 125 U/L (ref 40–150)
ALP SERPL-CCNC: NORMAL U/L (ref 40–150)
ALT SERPL W P-5'-P-CCNC: 21 U/L (ref 0–50)
ALT SERPL W P-5'-P-CCNC: NORMAL U/L (ref 0–50)
ANION GAP SERPL CALCULATED.3IONS-SCNC: 2 MMOL/L (ref 3–14)
ANION GAP SERPL CALCULATED.3IONS-SCNC: 4 MMOL/L (ref 3–14)
ANION GAP SERPL CALCULATED.3IONS-SCNC: 4 MMOL/L (ref 3–14)
ANION GAP SERPL CALCULATED.3IONS-SCNC: 5 MMOL/L (ref 3–14)
ANION GAP SERPL CALCULATED.3IONS-SCNC: NORMAL MMOL/L (ref 6–17)
AST SERPL W P-5'-P-CCNC: 32 U/L (ref 0–45)
AST SERPL W P-5'-P-CCNC: NORMAL U/L (ref 0–45)
BASE DEFICIT BLDV-SCNC: 2 MMOL/L
BASE DEFICIT BLDV-SCNC: 3.4 MMOL/L
BASOPHILS # BLD AUTO: 0.1 10E9/L (ref 0–0.2)
BASOPHILS NFR BLD AUTO: 1.3 %
BILIRUB SERPL-MCNC: 0.4 MG/DL (ref 0.2–1.3)
BILIRUB SERPL-MCNC: NORMAL MG/DL (ref 0.2–1.3)
BUN SERPL-MCNC: 20 MG/DL (ref 7–30)
BUN SERPL-MCNC: 24 MG/DL (ref 7–30)
BUN SERPL-MCNC: 34 MG/DL (ref 7–30)
BUN SERPL-MCNC: 39 MG/DL (ref 7–30)
BUN SERPL-MCNC: NORMAL MG/DL (ref 7–30)
C PNEUM DNA SPEC QL NAA+PROBE: NOT DETECTED
CA-I BLD-MCNC: 4.8 MG/DL (ref 4.4–5.2)
CA-I SERPL ISE-MCNC: 3.9 MG/DL (ref 4.4–5.2)
CA-I SERPL ISE-MCNC: 4.9 MG/DL (ref 4.4–5.2)
CA-I SERPL ISE-MCNC: 4.9 MG/DL (ref 4.4–5.2)
CALCIUM SERPL-MCNC: 7.2 MG/DL (ref 8.5–10.1)
CALCIUM SERPL-MCNC: 8.7 MG/DL (ref 8.5–10.1)
CALCIUM SERPL-MCNC: 8.9 MG/DL (ref 8.5–10.1)
CALCIUM SERPL-MCNC: 9.1 MG/DL (ref 8.5–10.1)
CALCIUM SERPL-MCNC: NORMAL MG/DL (ref 8.5–10.1)
CHLORIDE SERPL-SCNC: 106 MMOL/L (ref 94–109)
CHLORIDE SERPL-SCNC: 107 MMOL/L (ref 94–109)
CHLORIDE SERPL-SCNC: NORMAL MMOL/L (ref 94–109)
CK SERPL-CCNC: 113 U/L (ref 30–225)
CO2 SERPL-SCNC: 24 MMOL/L (ref 20–32)
CO2 SERPL-SCNC: 25 MMOL/L (ref 20–32)
CO2 SERPL-SCNC: 26 MMOL/L (ref 20–32)
CO2 SERPL-SCNC: 27 MMOL/L (ref 20–32)
CO2 SERPL-SCNC: NORMAL MMOL/L (ref 20–32)
CORTIS SERPL-MCNC: 7.8 UG/DL (ref 4–22)
CREAT SERPL-MCNC: 2.09 MG/DL (ref 0.52–1.04)
CREAT SERPL-MCNC: 2.73 MG/DL (ref 0.52–1.04)
CREAT SERPL-MCNC: 3.88 MG/DL (ref 0.52–1.04)
CREAT SERPL-MCNC: 4.32 MG/DL (ref 0.52–1.04)
CREAT SERPL-MCNC: NORMAL MG/DL (ref 0.52–1.04)
DIFFERENTIAL METHOD BLD: ABNORMAL
EOSINOPHIL # BLD AUTO: 0.5 10E9/L (ref 0–0.7)
EOSINOPHIL NFR BLD AUTO: 9 %
ERYTHROCYTE [DISTWIDTH] IN BLOOD BY AUTOMATED COUNT: 15.9 % (ref 10–15)
FLUAV H1 2009 PAND RNA SPEC QL NAA+PROBE: NOT DETECTED
FLUAV H1 RNA SPEC QL NAA+PROBE: NOT DETECTED
FLUAV H3 RNA SPEC QL NAA+PROBE: NOT DETECTED
FLUAV RNA SPEC QL NAA+PROBE: NOT DETECTED
FLUBV RNA SPEC QL NAA+PROBE: NOT DETECTED
GFR SERPL CREATININE-BSD FRML MDRD: 11 ML/MIN/{1.73_M2}
GFR SERPL CREATININE-BSD FRML MDRD: 16 ML/MIN/{1.73_M2}
GFR SERPL CREATININE-BSD FRML MDRD: 23 ML/MIN/{1.73_M2}
GFR SERPL CREATININE-BSD FRML MDRD: 9 ML/MIN/{1.73_M2}
GFR SERPL CREATININE-BSD FRML MDRD: NORMAL ML/MIN/{1.73_M2}
GLUCOSE BLDC GLUCOMTR-MCNC: 127 MG/DL (ref 70–99)
GLUCOSE BLDC GLUCOMTR-MCNC: 150 MG/DL (ref 70–99)
GLUCOSE SERPL-MCNC: 113 MG/DL (ref 70–99)
GLUCOSE SERPL-MCNC: 126 MG/DL (ref 70–99)
GLUCOSE SERPL-MCNC: 138 MG/DL (ref 70–99)
GLUCOSE SERPL-MCNC: 197 MG/DL (ref 70–99)
GLUCOSE SERPL-MCNC: NORMAL MG/DL (ref 70–99)
GRAM STN SPEC: NORMAL
HADV DNA SPEC QL NAA+PROBE: NOT DETECTED
HCO3 BLDV-SCNC: 25 MMOL/L (ref 21–28)
HCO3 BLDV-SCNC: 25 MMOL/L (ref 21–28)
HCOV PNL SPEC NAA+PROBE: NOT DETECTED
HCT VFR BLD AUTO: 33.5 % (ref 35–47)
HGB BLD-MCNC: 10.3 G/DL (ref 11.7–15.7)
HMPV RNA SPEC QL NAA+PROBE: NOT DETECTED
HPIV1 RNA SPEC QL NAA+PROBE: NOT DETECTED
HPIV2 RNA SPEC QL NAA+PROBE: NOT DETECTED
HPIV3 RNA SPEC QL NAA+PROBE: NOT DETECTED
HPIV4 RNA SPEC QL NAA+PROBE: NOT DETECTED
IMM GRANULOCYTES # BLD: 0.1 10E9/L (ref 0–0.4)
IMM GRANULOCYTES NFR BLD: 0.9 %
INTERPRETATION ECG - MUSE: NORMAL
LACTATE BLD-SCNC: 1.8 MMOL/L (ref 0.7–2)
LACTATE BLD-SCNC: 2.1 MMOL/L (ref 0.7–2)
LIPASE SERPL-CCNC: 151 U/L (ref 73–393)
LYMPHOCYTES # BLD AUTO: 1.2 10E9/L (ref 0.8–5.3)
LYMPHOCYTES NFR BLD AUTO: 21.5 %
Lab: NORMAL
M PNEUMO DNA SPEC QL NAA+PROBE: NOT DETECTED
MAGNESIUM SERPL-MCNC: 2 MG/DL (ref 1.6–2.3)
MAGNESIUM SERPL-MCNC: 2.1 MG/DL (ref 1.6–2.3)
MAGNESIUM SERPL-MCNC: 2.2 MG/DL (ref 1.6–2.3)
MAGNESIUM SERPL-MCNC: NORMAL MG/DL (ref 1.6–2.3)
MCH RBC QN AUTO: 31.9 PG (ref 26.5–33)
MCHC RBC AUTO-ENTMCNC: 30.7 G/DL (ref 31.5–36.5)
MCV RBC AUTO: 104 FL (ref 78–100)
MICROBIOLOGIST REVIEW: NORMAL
MONOCYTES # BLD AUTO: 0.4 10E9/L (ref 0–1.3)
MONOCYTES NFR BLD AUTO: 8 %
NEUTROPHILS # BLD AUTO: 3.3 10E9/L (ref 1.6–8.3)
NEUTROPHILS NFR BLD AUTO: 59.3 %
NRBC # BLD AUTO: 0 10*3/UL
NRBC BLD AUTO-RTO: 0 /100
O2/TOTAL GAS SETTING VFR VENT: 21 %
O2/TOTAL GAS SETTING VFR VENT: 21 %
OXYHGB MFR BLDV: 69 %
PCO2 BLDV: 50 MM HG (ref 40–50)
PCO2 BLDV: 61 MM HG (ref 40–50)
PH BLDV: 7.22 PH (ref 7.32–7.43)
PH BLDV: 7.3 PH (ref 7.32–7.43)
PHOSPHATE SERPL-MCNC: 3.2 MG/DL (ref 2.5–4.5)
PHOSPHATE SERPL-MCNC: 4.4 MG/DL (ref 2.5–4.5)
PHOSPHATE SERPL-MCNC: 4.6 MG/DL (ref 2.5–4.5)
PHOSPHATE SERPL-MCNC: NORMAL MG/DL (ref 2.5–4.5)
PLATELET # BLD AUTO: 190 10E9/L (ref 150–450)
PO2 BLDV: 32 MM HG (ref 25–47)
PO2 BLDV: 40 MM HG (ref 25–47)
POTASSIUM BLD-SCNC: 5.4 MMOL/L (ref 3.4–5.3)
POTASSIUM BLD-SCNC: 6.1 MMOL/L (ref 3.4–5.3)
POTASSIUM SERPL-SCNC: 4.9 MMOL/L (ref 3.4–5.3)
POTASSIUM SERPL-SCNC: 5.2 MMOL/L (ref 3.4–5.3)
POTASSIUM SERPL-SCNC: 5.3 MMOL/L (ref 3.4–5.3)
POTASSIUM SERPL-SCNC: 6.1 MMOL/L (ref 3.4–5.3)
POTASSIUM SERPL-SCNC: 6.8 MMOL/L (ref 3.4–5.3)
POTASSIUM SERPL-SCNC: NORMAL MMOL/L (ref 3.4–5.3)
PROCALCITONIN SERPL-MCNC: 0.17 NG/ML
PROCALCITONIN SERPL-MCNC: NORMAL NG/ML
PROT SERPL-MCNC: 5.9 G/DL (ref 6.8–8.8)
PROT SERPL-MCNC: NORMAL G/DL (ref 6.8–8.8)
RBC # BLD AUTO: 3.23 10E12/L (ref 3.8–5.2)
RSV RNA SPEC QL NAA+PROBE: NOT DETECTED
RSV RNA SPEC QL NAA+PROBE: NOT DETECTED
RV+EV RNA SPEC QL NAA+PROBE: NOT DETECTED
SODIUM SERPL-SCNC: 133 MMOL/L (ref 133–144)
SODIUM SERPL-SCNC: 134 MMOL/L (ref 133–144)
SODIUM SERPL-SCNC: 135 MMOL/L (ref 133–144)
SODIUM SERPL-SCNC: 138 MMOL/L (ref 133–144)
SODIUM SERPL-SCNC: NORMAL MMOL/L (ref 133–144)
SPECIMEN SOURCE: NORMAL
T3 SERPL-MCNC: 25 NG/DL (ref 60–181)
T3 SERPL-MCNC: 29 NG/DL (ref 60–181)
WBC # BLD AUTO: 5.5 10E9/L (ref 4–11)

## 2020-01-26 PROCEDURE — 87206 SMEAR FLUORESCENT/ACID STAI: CPT | Performed by: STUDENT IN AN ORGANIZED HEALTH CARE EDUCATION/TRAINING PROGRAM

## 2020-01-26 PROCEDURE — 25000128 H RX IP 250 OP 636: Performed by: STUDENT IN AN ORGANIZED HEALTH CARE EDUCATION/TRAINING PROGRAM

## 2020-01-26 PROCEDURE — 82330 ASSAY OF CALCIUM: CPT | Performed by: STUDENT IN AN ORGANIZED HEALTH CARE EDUCATION/TRAINING PROGRAM

## 2020-01-26 PROCEDURE — 25000132 ZZH RX MED GY IP 250 OP 250 PS 637: Performed by: STUDENT IN AN ORGANIZED HEALTH CARE EDUCATION/TRAINING PROGRAM

## 2020-01-26 PROCEDURE — 20000004 ZZH R&B ICU UMMC

## 2020-01-26 PROCEDURE — 87633 RESP VIRUS 12-25 TARGETS: CPT | Performed by: NURSE PRACTITIONER

## 2020-01-26 PROCEDURE — 87070 CULTURE OTHR SPECIMN AEROBIC: CPT | Performed by: NURSE PRACTITIONER

## 2020-01-26 PROCEDURE — 99291 CRITICAL CARE FIRST HOUR: CPT | Mod: 25 | Performed by: INTERNAL MEDICINE

## 2020-01-26 PROCEDURE — 40000986 XR CHEST PORT 1 VW

## 2020-01-26 PROCEDURE — 25000125 ZZHC RX 250: Performed by: STUDENT IN AN ORGANIZED HEALTH CARE EDUCATION/TRAINING PROGRAM

## 2020-01-26 PROCEDURE — 82330 ASSAY OF CALCIUM: CPT | Performed by: INTERNAL MEDICINE

## 2020-01-26 PROCEDURE — 83735 ASSAY OF MAGNESIUM: CPT | Performed by: INTERNAL MEDICINE

## 2020-01-26 PROCEDURE — 87015 SPECIMEN INFECT AGNT CONCNTJ: CPT | Performed by: STUDENT IN AN ORGANIZED HEALTH CARE EDUCATION/TRAINING PROGRAM

## 2020-01-26 PROCEDURE — 83690 ASSAY OF LIPASE: CPT | Performed by: STUDENT IN AN ORGANIZED HEALTH CARE EDUCATION/TRAINING PROGRAM

## 2020-01-26 PROCEDURE — 87205 SMEAR GRAM STAIN: CPT | Performed by: NURSE PRACTITIONER

## 2020-01-26 PROCEDURE — 36556 INSERT NON-TUNNEL CV CATH: CPT | Mod: LT | Performed by: NURSE PRACTITIONER

## 2020-01-26 PROCEDURE — 82533 TOTAL CORTISOL: CPT | Performed by: INTERNAL MEDICINE

## 2020-01-26 PROCEDURE — 00000146 ZZHCL STATISTIC GLUCOSE BY METER IP

## 2020-01-26 PROCEDURE — 93010 ELECTROCARDIOGRAM REPORT: CPT | Performed by: INTERNAL MEDICINE

## 2020-01-26 PROCEDURE — 85025 COMPLETE CBC W/AUTO DIFF WBC: CPT | Performed by: INTERNAL MEDICINE

## 2020-01-26 PROCEDURE — 84145 PROCALCITONIN (PCT): CPT | Performed by: INTERNAL MEDICINE

## 2020-01-26 PROCEDURE — 25800030 ZZH RX IP 258 OP 636: Performed by: STUDENT IN AN ORGANIZED HEALTH CARE EDUCATION/TRAINING PROGRAM

## 2020-01-26 PROCEDURE — 40000556 ZZH STATISTIC PERIPHERAL IV START W US GUIDANCE

## 2020-01-26 PROCEDURE — 84100 ASSAY OF PHOSPHORUS: CPT | Performed by: INTERNAL MEDICINE

## 2020-01-26 PROCEDURE — 83735 ASSAY OF MAGNESIUM: CPT | Performed by: STUDENT IN AN ORGANIZED HEALTH CARE EDUCATION/TRAINING PROGRAM

## 2020-01-26 PROCEDURE — 80048 BASIC METABOLIC PNL TOTAL CA: CPT | Performed by: STUDENT IN AN ORGANIZED HEALTH CARE EDUCATION/TRAINING PROGRAM

## 2020-01-26 PROCEDURE — 82550 ASSAY OF CK (CPK): CPT | Performed by: STUDENT IN AN ORGANIZED HEALTH CARE EDUCATION/TRAINING PROGRAM

## 2020-01-26 PROCEDURE — 25000128 H RX IP 250 OP 636: Performed by: NURSE PRACTITIONER

## 2020-01-26 PROCEDURE — 80053 COMPREHEN METABOLIC PANEL: CPT | Performed by: INTERNAL MEDICINE

## 2020-01-26 PROCEDURE — 83605 ASSAY OF LACTIC ACID: CPT | Performed by: NURSE PRACTITIONER

## 2020-01-26 PROCEDURE — 84132 ASSAY OF SERUM POTASSIUM: CPT | Performed by: NURSE PRACTITIONER

## 2020-01-26 PROCEDURE — 84100 ASSAY OF PHOSPHORUS: CPT | Performed by: STUDENT IN AN ORGANIZED HEALTH CARE EDUCATION/TRAINING PROGRAM

## 2020-01-26 PROCEDURE — 82803 BLOOD GASES ANY COMBINATION: CPT | Performed by: NURSE PRACTITIONER

## 2020-01-26 PROCEDURE — P9041 ALBUMIN (HUMAN),5%, 50ML: HCPCS | Performed by: NURSE PRACTITIONER

## 2020-01-26 PROCEDURE — 93005 ELECTROCARDIOGRAM TRACING: CPT

## 2020-01-26 PROCEDURE — 82805 BLOOD GASES W/O2 SATURATION: CPT | Performed by: NURSE PRACTITIONER

## 2020-01-26 PROCEDURE — 84480 ASSAY TRIIODOTHYRONINE (T3): CPT | Performed by: INTERNAL MEDICINE

## 2020-01-26 PROCEDURE — 87581 M.PNEUMON DNA AMP PROBE: CPT | Performed by: NURSE PRACTITIONER

## 2020-01-26 PROCEDURE — P9047 ALBUMIN (HUMAN), 25%, 50ML: HCPCS | Performed by: NURSE PRACTITIONER

## 2020-01-26 PROCEDURE — 05HN33Z INSERTION OF INFUSION DEVICE INTO LEFT INTERNAL JUGULAR VEIN, PERCUTANEOUS APPROACH: ICD-10-PCS | Performed by: INTERNAL MEDICINE

## 2020-01-26 PROCEDURE — 87116 MYCOBACTERIA CULTURE: CPT | Performed by: STUDENT IN AN ORGANIZED HEALTH CARE EDUCATION/TRAINING PROGRAM

## 2020-01-26 PROCEDURE — 87486 CHLMYD PNEUM DNA AMP PROBE: CPT | Performed by: NURSE PRACTITIONER

## 2020-01-26 PROCEDURE — 36415 COLL VENOUS BLD VENIPUNCTURE: CPT | Performed by: INTERNAL MEDICINE

## 2020-01-26 PROCEDURE — 90947 DIALYSIS REPEATED EVAL: CPT

## 2020-01-26 RX ORDER — ALBUMIN (HUMAN) 12.5 G/50ML
SOLUTION INTRAVENOUS
Status: DISCONTINUED
Start: 2020-01-26 | End: 2020-01-26 | Stop reason: HOSPADM

## 2020-01-26 RX ORDER — POTASSIUM CHLORIDE 29.8 MG/ML
20 INJECTION INTRAVENOUS EVERY 6 HOURS PRN
Status: DISCONTINUED | OUTPATIENT
Start: 2020-01-26 | End: 2020-01-27

## 2020-01-26 RX ORDER — OXYCODONE HYDROCHLORIDE 5 MG/1
5 TABLET ORAL
Status: COMPLETED | OUTPATIENT
Start: 2020-01-26 | End: 2020-01-26

## 2020-01-26 RX ORDER — DIPHENHYDRAMINE HYDROCHLORIDE 50 MG/ML
25 INJECTION INTRAMUSCULAR; INTRAVENOUS ONCE
Status: COMPLETED | OUTPATIENT
Start: 2020-01-26 | End: 2020-01-26

## 2020-01-26 RX ORDER — LEVOTHYROXINE SODIUM ANHYDROUS 100 UG/5ML
150 INJECTION, POWDER, LYOPHILIZED, FOR SOLUTION INTRAVENOUS DAILY
Status: DISCONTINUED | OUTPATIENT
Start: 2020-01-27 | End: 2020-01-27

## 2020-01-26 RX ORDER — ALBUMIN (HUMAN) 12.5 G/50ML
50 SOLUTION INTRAVENOUS ONCE
Status: COMPLETED | OUTPATIENT
Start: 2020-01-26 | End: 2020-01-26

## 2020-01-26 RX ORDER — PIPERACILLIN SODIUM, TAZOBACTAM SODIUM 4; .5 G/20ML; G/20ML
4.5 INJECTION, POWDER, LYOPHILIZED, FOR SOLUTION INTRAVENOUS EVERY 8 HOURS SCHEDULED
Status: DISCONTINUED | OUTPATIENT
Start: 2020-01-26 | End: 2020-01-26

## 2020-01-26 RX ORDER — FENTANYL CITRATE 50 UG/ML
25-50 INJECTION, SOLUTION INTRAMUSCULAR; INTRAVENOUS EVERY 5 MIN PRN
Status: DISCONTINUED | OUTPATIENT
Start: 2020-01-26 | End: 2020-02-01 | Stop reason: HOSPADM

## 2020-01-26 RX ORDER — ALBUMIN, HUMAN INJ 5% 5 %
50 SOLUTION INTRAVENOUS ONCE
Status: DISCONTINUED | OUTPATIENT
Start: 2020-01-26 | End: 2020-01-26

## 2020-01-26 RX ORDER — ALBUMIN, HUMAN INJ 5% 5 %
25 SOLUTION INTRAVENOUS ONCE
Status: COMPLETED | OUTPATIENT
Start: 2020-01-26 | End: 2020-01-26

## 2020-01-26 RX ADMIN — CALCIUM CHLORIDE, MAGNESIUM CHLORIDE, DEXTROSE MONOHYDRATE, LACTIC ACID, SODIUM CHLORIDE, SODIUM BICARBONATE AND POTASSIUM CHLORIDE: 5.15; 2.03; 22; 5.4; 6.46; 3.09; .157 INJECTION INTRAVENOUS at 09:26

## 2020-01-26 RX ADMIN — SODIUM CHLORIDE, POTASSIUM CHLORIDE, SODIUM LACTATE AND CALCIUM CHLORIDE 500 ML: 600; 310; 30; 20 INJECTION, SOLUTION INTRAVENOUS at 09:30

## 2020-01-26 RX ADMIN — CALCIUM CHLORIDE, MAGNESIUM CHLORIDE, DEXTROSE MONOHYDRATE, LACTIC ACID, SODIUM CHLORIDE, SODIUM BICARBONATE AND POTASSIUM CHLORIDE 20 ML/KG/HR: 5.15; 2.03; 22; 5.4; 6.46; 3.09; .157 INJECTION INTRAVENOUS at 17:18

## 2020-01-26 RX ADMIN — CALCIUM CHLORIDE, MAGNESIUM CHLORIDE, DEXTROSE MONOHYDRATE, LACTIC ACID, SODIUM CHLORIDE, SODIUM BICARBONATE AND POTASSIUM CHLORIDE 20 ML/KG/HR: 5.15; 2.03; 22; 5.4; 6.46; 3.09; .157 INJECTION INTRAVENOUS at 21:01

## 2020-01-26 RX ADMIN — SENNOSIDES AND DOCUSATE SODIUM 1 TABLET: 8.6; 5 TABLET ORAL at 07:47

## 2020-01-26 RX ADMIN — CALCIUM CHLORIDE, MAGNESIUM CHLORIDE, SODIUM CHLORIDE, SODIUM BICARBONATE, POTASSIUM CHLORIDE AND SODIUM PHOSPHATE DIBASIC DIHYDRATE 12.5 ML/KG/HR: 3.68; 3.05; 6.34; 3.09; .314; .187 INJECTION INTRAVENOUS at 07:09

## 2020-01-26 RX ADMIN — HYDROCORTISONE SODIUM SUCCINATE 50 MG: 100 INJECTION, POWDER, FOR SOLUTION INTRAMUSCULAR; INTRAVENOUS at 13:28

## 2020-01-26 RX ADMIN — Medication 5000 UNITS: at 22:03

## 2020-01-26 RX ADMIN — ACETAMINOPHEN 650 MG: 325 TABLET, FILM COATED ORAL at 13:57

## 2020-01-26 RX ADMIN — DIPHENHYDRAMINE HYDROCHLORIDE 25 MG: 50 INJECTION, SOLUTION INTRAMUSCULAR; INTRAVENOUS at 09:29

## 2020-01-26 RX ADMIN — Medication: at 07:47

## 2020-01-26 RX ADMIN — CALCIUM CHLORIDE, MAGNESIUM CHLORIDE, DEXTROSE MONOHYDRATE, LACTIC ACID, SODIUM CHLORIDE, SODIUM BICARBONATE AND POTASSIUM CHLORIDE 20 ML/KG/HR: 5.15; 2.03; 22; 5.4; 6.46; 3.09; .157 INJECTION INTRAVENOUS at 21:02

## 2020-01-26 RX ADMIN — FLUTICASONE PROPIONATE AND SALMETEROL 1 PUFF: 50; 250 POWDER RESPIRATORY (INHALATION) at 21:47

## 2020-01-26 RX ADMIN — ACETAMINOPHEN 650 MG: 325 TABLET, FILM COATED ORAL at 08:23

## 2020-01-26 RX ADMIN — ACETAMINOPHEN 650 MG: 325 TABLET, FILM COATED ORAL at 20:21

## 2020-01-26 RX ADMIN — Medication 1 CAPSULE: at 07:47

## 2020-01-26 RX ADMIN — CALCIUM CHLORIDE, MAGNESIUM CHLORIDE, DEXTROSE MONOHYDRATE, LACTIC ACID, SODIUM CHLORIDE, SODIUM BICARBONATE AND POTASSIUM CHLORIDE 12.5 ML/KG/HR: 5.15; 2.03; 22; 5.4; 6.46; 3.09; .157 INJECTION INTRAVENOUS at 09:27

## 2020-01-26 RX ADMIN — HYDROCORTISONE SODIUM SUCCINATE 50 MG: 100 INJECTION, POWDER, FOR SOLUTION INTRAMUSCULAR; INTRAVENOUS at 20:05

## 2020-01-26 RX ADMIN — HYDROCORTISONE SODIUM SUCCINATE 50 MG: 100 INJECTION, POWDER, FOR SOLUTION INTRAMUSCULAR; INTRAVENOUS at 07:46

## 2020-01-26 RX ADMIN — ALBUMIN HUMAN 50 G: 0.25 SOLUTION INTRAVENOUS at 10:52

## 2020-01-26 RX ADMIN — CALCIUM CHLORIDE, MAGNESIUM CHLORIDE, SODIUM CHLORIDE, SODIUM BICARBONATE, POTASSIUM CHLORIDE AND SODIUM PHOSPHATE DIBASIC DIHYDRATE 12.5 ML/KG/HR: 3.68; 3.05; 6.34; 3.09; .314; .187 INJECTION INTRAVENOUS at 00:47

## 2020-01-26 RX ADMIN — CALCIUM CHLORIDE, MAGNESIUM CHLORIDE, DEXTROSE MONOHYDRATE, LACTIC ACID, SODIUM CHLORIDE, SODIUM BICARBONATE AND POTASSIUM CHLORIDE 20 ML/KG/HR: 5.15; 2.03; 22; 5.4; 6.46; 3.09; .157 INJECTION INTRAVENOUS at 13:32

## 2020-01-26 RX ADMIN — CALCIUM CHLORIDE, MAGNESIUM CHLORIDE, SODIUM CHLORIDE, SODIUM BICARBONATE, POTASSIUM CHLORIDE AND SODIUM PHOSPHATE DIBASIC DIHYDRATE 12.5 ML/KG/HR: 3.68; 3.05; 6.34; 3.09; .314; .187 INJECTION INTRAVENOUS at 00:40

## 2020-01-26 RX ADMIN — LEVOTHYROXINE SODIUM 200 MCG: 100 TABLET ORAL at 07:46

## 2020-01-26 RX ADMIN — OXYCODONE HYDROCHLORIDE 5 MG: 5 TABLET ORAL at 13:57

## 2020-01-26 RX ADMIN — ASPIRIN 81 MG CHEWABLE TABLET 81 MG: 81 TABLET CHEWABLE at 07:46

## 2020-01-26 RX ADMIN — OXYCODONE HYDROCHLORIDE 5 MG: 5 TABLET ORAL at 03:58

## 2020-01-26 RX ADMIN — CALCIUM CHLORIDE 2 G: 100 INJECTION, SOLUTION INTRAVENOUS at 07:46

## 2020-01-26 RX ADMIN — CALCIUM CHLORIDE, MAGNESIUM CHLORIDE, SODIUM CHLORIDE, SODIUM BICARBONATE, POTASSIUM CHLORIDE AND SODIUM PHOSPHATE DIBASIC DIHYDRATE 12.5 ML/KG/HR: 3.68; 3.05; 6.34; 3.09; .314; .187 INJECTION INTRAVENOUS at 07:10

## 2020-01-26 RX ADMIN — OXYCODONE HYDROCHLORIDE 5 MG: 5 TABLET ORAL at 16:18

## 2020-01-26 RX ADMIN — FLUTICASONE PROPIONATE AND SALMETEROL 1 PUFF: 50; 250 POWDER RESPIRATORY (INHALATION) at 11:02

## 2020-01-26 RX ADMIN — ATORVASTATIN CALCIUM 40 MG: 40 TABLET, FILM COATED ORAL at 22:02

## 2020-01-26 RX ADMIN — CALCIUM CHLORIDE, MAGNESIUM CHLORIDE, DEXTROSE MONOHYDRATE, LACTIC ACID, SODIUM CHLORIDE, SODIUM BICARBONATE AND POTASSIUM CHLORIDE 20 ML/KG/HR: 5.15; 2.03; 22; 5.4; 6.46; 3.09; .157 INJECTION INTRAVENOUS at 17:17

## 2020-01-26 RX ADMIN — MELATONIN 2000 UNITS: at 07:46

## 2020-01-26 RX ADMIN — FENTANYL CITRATE 25 MCG: 50 INJECTION, SOLUTION INTRAMUSCULAR; INTRAVENOUS at 09:21

## 2020-01-26 RX ADMIN — Medication: at 22:10

## 2020-01-26 RX ADMIN — Medication 5000 UNITS: at 11:02

## 2020-01-26 RX ADMIN — ACETAMINOPHEN 650 MG: 325 TABLET, FILM COATED ORAL at 02:01

## 2020-01-26 RX ADMIN — UMECLIDINIUM 1 PUFF: 62.5 AEROSOL, POWDER ORAL at 07:47

## 2020-01-26 RX ADMIN — OXYCODONE HYDROCHLORIDE 5 MG: 5 TABLET ORAL at 22:02

## 2020-01-26 RX ADMIN — ALBUMIN HUMAN 25 G: 0.05 INJECTION, SOLUTION INTRAVENOUS at 11:06

## 2020-01-26 RX ADMIN — Medication: at 03:58

## 2020-01-26 RX ADMIN — Medication 1 MG: at 22:32

## 2020-01-26 ASSESSMENT — ACTIVITIES OF DAILY LIVING (ADL)
ADLS_ACUITY_SCORE: 20
ADLS_ACUITY_SCORE: 18
ADLS_ACUITY_SCORE: 16
ADLS_ACUITY_SCORE: 18
ADLS_ACUITY_SCORE: 20
ADLS_ACUITY_SCORE: 20

## 2020-01-26 ASSESSMENT — MIFFLIN-ST. JEOR: SCORE: 1233.5

## 2020-01-26 ASSESSMENT — PAIN DESCRIPTION - DESCRIPTORS: DESCRIPTORS: PINS AND NEEDLES;SHOOTING

## 2020-01-26 NOTE — PLAN OF CARE
OT: Pt w/ procedure this AM,  in late AM per discussion w/ RN, OT will check back pending K levels. OT will check back as available/appropriate or reschedule for 1/27/20.

## 2020-01-26 NOTE — PROGRESS NOTES
CRRT INITIATION NOTE    Consent for CRRT Completed:  Yes  Patient s Vascular Access: Tunneled subclavian left Catheter                Placement Confirmed: Yes  \Manufacture:  Quantifeed    Length/Albanian Size:  1.9cc 23cm 14.5fr      DATA:  Procedure:  cvvhdf  Start Time:  18:18  Machine#:  12  Filter:  M150  Blood Flow:  200  ML/min  Pre-Replacement Solution:  Phoxillum BK4/2.5  Post-Replacement Solution:   Phoxillum BK4/2.5  Dialysate Solution:  Phoxillum BK4/2.5  Pre-Replacement Solution Rate:  800 mL/hr  Post-Replacement Solution Rate: 200 mL/hr  Dialysate Flow Rate:  800 mL/hr   Patient Removal Rate:  90 mL/hr  Anticoagulation Type and Rate:  n/a    ASSESSMENT:  How Patient Tolerated Initiation:   Vital Signs:  126/111  Initial Pressures:  Access:  -79  Filter: 131  Return:  67  TMP:  28  Change in Filter Pressure:  32      INTERVENTIONS:  Initiation    PLAN:  Support the bedside RN.  Change circuit q72H and PRN.

## 2020-01-26 NOTE — PROGRESS NOTES
Nephrology Progress Note  01/26/2020         Assessment & Recommendations:   Kim Anne is a 74 year old female with h/o ESRD (biopsy proven DKD, remote kidney donation prior to CKD) on TTS iHD, COPD not on home O2, and HTN presenting to the ED from dialysis due to hypotension. 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 Found to have hypothyroidism and receiving CRRT.      # ESRD on TTS iHD  Patient gets HD with Dr Liz at [facility], running for 3.5 hours still via her CVC with AVF created back in 11/2019. On CRRT with shock.   -pt signed dialysis consent   -CRRT renewed, changed to 2K baths and 20ml/kg/hr clearance due to persistent hyperkalemia      # hypotension  # volume status  Pt appearing at her recent dry weight (?or target weight) and may even be hypovolemic by U/S findings. Ok to bolus fluids. Hypothyroidism is potential cause of shock.   -BCx from CVC ordered, AVF not appearing infected on exam, NGTD  -I=O ordered for CRRT at this time but ok to be net positive if improved hemodynamics      # electrolytes, acid-base  # hyperkalemia  Dialysis, missed since Tuesday, now managed with CRRT due to hypotension.      # anemia  Can resume ESAs once returning to iHD.      # MBD-CKD  Calcium normal and phos acceptable.           Recommendations were communicated to primary team via note.    Seen and discussed with Dr. Laurent.    Efrain Mcdonnell MD   [phone]    Interval History :   Nursing and provider notes from last 24 hours reviewed.  In the last 24 hours Kim Anne in ICU now. Remains on low dose pressors. Discovered to have been missing her synthroid with hypothyroidism on labs.     Collapsing veins on U/S per primary ICU team.     Review of Systems:   I reviewed the following  "systems:  GI: no nausea or vomiting or diarrhea.   Neuro:  denies confusion  Constitutional:  no fever or chills  CV: no dyspnea or edema.  no chest pain.    Physical Exam:   I/O last 3 completed shifts:  In: 1332.09 [P.O.:240; I.V.:1210.09]  Out: 931 [Urine:330; Other:601]   /66   Pulse 61   Temp 97.3  F (36.3  C) (Oral)   Resp 11   Ht 1.727 m (5' 8\")   Wt 68.5 kg (151 lb 0.2 oz)   SpO2 99%   BMI 22.96 kg/m       GENERAL APPEARANCE: NAD  EYES:  no scleral icterus, pupils equal  PULM: lungs clear   CV: regular rhythm, normal rate, no rub    -edema - none  GI: soft, NTND  Access: TDC and previously unused AVF    Labs:   All labs reviewed by me  Electrolytes/Renal -   Recent Labs   Lab Test 01/26/20  1030 01/26/20  0841 01/26/20  0719 01/26/20  0550     --  133 Canceled, Test credited   POTASSIUM 6.1* 6.1* 6.8* Canceled, Test credited   CHLORIDE 107  --  106 Canceled, Test credited   CO2 26  --  24 Canceled, Test credited   BUN 34*  --  39* Canceled, Test credited   CR 3.88*  --  4.32* Canceled, Test credited   *  --  113* Canceled, Test credited   FRANCES 8.7  --  7.2* Canceled, Test credited   MAG 2.1  --  2.2 Canceled, Test credited   PHOS 4.6*  --  4.4 Canceled, Test credited       CBC -   Recent Labs   Lab Test 01/26/20  0550 01/25/20  1308 01/25/20  0815  12/25/19  0858   WBC 5.5  --  9.4  --  8.5   HGB 10.3* 11.9 11.9   < > 10.1*     --  279  --  346    < > = values in this interval not displayed.       LFTs -   Recent Labs   Lab Test 01/26/20  0719 01/26/20  0550 12/25/19  0858   ALKPHOS 125 Canceled, Test credited 298*   BILITOTAL 0.4 Canceled, Test credited 0.4   ALT 21 Canceled, Test credited 24   AST 32 Canceled, Test credited 32   PROTTOTAL 5.9* Canceled, Test credited 7.5   ALBUMIN 2.5* Canceled, Test credited 2.8*       Iron Panel -   Recent Labs   Lab Test 05/01/19  1320 02/16/18  0945 09/11/17  0928   IRON 48 46 73   IRONSAT 34 28 43   * 134 127 "         Imaging:  All imaging studies reviewed by me.     Current Medications:    albumin human         aspirin  81 mg Oral Daily     atorvastatin  40 mg Oral At Bedtime     docusate sodium  200 mg Oral Daily     emollient   Topical Daily     fluticasone-salmeterol  1 puff Inhalation Q12H     heparin ANTICOAGULANT  5,000 Units Subcutaneous Q12H     hydrocortisone sodium succinate PF  50 mg Intravenous Q6H     levothyroxine  200 mcg Oral QAM AC     multivitamin RENAL  1 capsule Oral Daily     senna-docusate  1 tablet Oral BID    Or     senna-docusate  2 tablet Oral BID     umeclidinium  1 puff Inhalation Daily     vitamin D3  2,000 Units Oral Daily       CRRT replacement solution 20 mL/kg/hr (01/26/20 1101)     - MEDICATION INSTRUCTIONS -       norepinephrine Stopped (01/26/20 0745)     - MEDICATION INSTRUCTIONS -       CRRT replacement solution 200 mL/hr at 01/26/20 0926     CRRT replacement solution 20 mL/kg/hr (01/26/20 1101)     Efrain Mcdonnell MD

## 2020-01-26 NOTE — PROGRESS NOTES
CRRT STATUS NOTE    DATA:  Time: 1700  Pressures WNL:  YES  Filter Status:  WDL after changed at 0930  Problems Reported/Alarms Noted:  Tunneled line was twisted / access alarms in AM.   Supplies Present:  YES    ASSESSMENT:  Patient Net Fluid Balance:  Yesterday, Net +227mL; Today thus far net +1L after albumin and fluids given.   Vital Signs:  HR 62 w ectopy; /75 (94) with Levo off and zero pull.   Labs:  6.1>5.4 after 2K Bath change; now 5.2. ical 4.8. LA down to 1.8 after fluid bolus.    Goals of Therapy: I=O. Meeting hourly goals of therapy.     INTERVENTIONS:   Filter clotting at 0830; returned blood.   STAT redraw K whole blood 6.1. Orders received to change to 2K Bath.   New filter primed and started on 2K bath at higher rates at 0930.   No changes since.     PLAN:  Continue CRRT to meet goals of therapy. Consider HD if no pressor needed once pulling fluid.    Contact CRRT RN [phone] with concerns.

## 2020-01-26 NOTE — PROCEDURES
[facility] [place]    Central line  Date/Time: 1/26/2020 10:27 AM  Performed by: Margret Abel APRN CNP  Authorized by: Roxanne Hoyt MD   Indications: vascular access (Hypotension, Shock, vasopressors )    UNIVERSAL PROTOCOL   Site Marked: NA  Prior Images Obtained and Reviewed:  NA  Required items: Required blood products, implants, devices and special equipment available    Patient identity confirmed:  Verbally with patient, arm band, provided demographic data and hospital-assigned identification number  Patient was reevaluated immediately before administering moderate or deep sedation or anesthesia  Confirmation Checklist:  Patient's identity using two indicators, relevant allergies, procedure was appropriate and matched the consent or emergent situation and correct equipment/implants were available  Time out: Immediately prior to the procedure a time out was called    Universal Protocol: the Joint Commission Universal Protocol was followed    Preparation: Patient was prepped and draped in usual sterile fashion           ANESTHESIA    Anesthesia: Local infiltration  Local Anesthetic:  Lidocaine 1% without epinephrine  Anesthetic Total (mL):  4      SEDATION    Patient Sedated: No      Preparation: skin prepped with 2% chlorhexidine  Skin prep agent dried: skin prep agent completely dried prior to procedure  Sterile barriers: all five maximum sterile barriers used - cap, mask, sterile gown, sterile gloves, and large sterile sheet  Hand hygiene: hand hygiene performed prior to central venous catheter insertion  Location details: left internal jugular  Site selection rationale: Right side HD   Patient position: Trendelenburg  Catheter type: triple lumen  Catheter size: 7.5 Fr (20cm )  Pre-procedure: landmarks identified  Ultrasound guidance: yes  Sterile ultrasound techniques: sterile gel and sterile probe covers were used  Number of attempts: 4  Successful placement:  yes  Post-procedure: line sutured and dressing applied  Assessment: blood return through all ports,  free fluid flow,  no pneumothorax on x-ray and placement verified by x-ray    PROCEDURE   Patient Tolerance:  Patient tolerated the procedure well with no immediate complications  Describe Procedure: Central Line placement.     Indication: Hypotension, Renal Failure, Shock requiring vasopressors   Site rational: Right Side HD line   Operators: Margret Abel   Cathter size: 20cm 7.5fr     Patient identity confirmed using two unique identifiers. Consent was verified in the chart. Patient / representative's understanding matches procedure consent.    Pre-procedure radiology reviewed:       Time out called 0845    Required items for procedure available. The  LEFT was prepped with chlorhexidine  draped, and anesthetized in sterile fashion with all five maximal barriers used - gloves, gown, cap, mask, and sterile drape. Skin prep agent completely dried prior to procedure.     Local anesthetic 4ml 1% lidocaine used. Under US guidance, the LEFT IJ was cannulated easily however significant difficulty in threading wire was noted. Vessel was cannulated x4, with the wire finally being able to be passed on the 4th  Attempt, as this writer had to go more medial rather than laterally. The wire was visualized in the vein with US. A 2mm skin incision was created using the scalpel, and using the Seldinger technique, the dilator was passed on the wire and subsequently withdrawn.  The CVC catheter was then passed over the wire and the wire was removed via the distal port.      All catheter lumens had venous blood return and were subsequently flushed with sterile saline.  The line was sutured in place with prolene and covered with a biopatch impregnated Tegaderm.  Xray ordered to confirm placement.  The patient tolerated the procedure well with no immediate complications.    Post-procedure radiology reviewed: Line is in proper  place and is good to use.    Details of the procedure discussed with Attending physician    Length of time physician/provider present for 1:1 monitoring during sedation: 0

## 2020-01-26 NOTE — SIGNIFICANT EVENT
SPIRITUAL HEALTH SERVICES Significant Event  [facility] Sacrament of ANOINTING  [facility] ([place]) 4A    PATIENT ANOINTED, given Advent Communion and Rosary by Father Massimo Estiven 1/26/2020 per request of pt.    PLAN: I will follow while pt is on 4th floor.    Massimo Jean-Baptiste M.Div.     Pager [phone]

## 2020-01-26 NOTE — PROGRESS NOTES
MICU PROGRESS NOTE  January 26, 2020      CO-MORBIDITIES:   Other hypervolemia  (primary encounter diagnosis)  Hyperkalemia  Acute hypotension  End stage renal failure on dialysis (H)    ASSESSMENT: Kim Anne is a 74 year old  female with PMHx of MI, CAD, COPD, DM2, h/o TB s/p tx in the 1970s (repeat TB test + on 5/5/19). hypothyroidism, ESRD on HD (T, Th, Sat) who presented to the ED on 1/25/20 secondary to hypotension during hemodialysis.     24 hr events: s/p 750 ml of IVF yesterday. Received x1 dose of vanc and zosyn. On Norepi at 0.03 overnight. CVC placed this am, Norepi is currently off. IVC collapsible, internal jugular flat during line placement. Received 25g Of Albumin today. Continually hyperkalemic this am to 6.1, Renal adjusted CRRT.    Changes  -Give 25G of albumin   -Increase dialysate rate   -Goal to keep 0 on CRRT   -Start IV levothyroxine per endocrine recs      PLAN:  Neuro/ pain/ sedation:  -No acute issues   - Monitor neurological status. Notify the MD for any acute changes in exam.  - tylenol pain.    Pulmonary care:   #COPD hx  -Cont Spiriva   #Resolving respiratory Acidosis   -improving, most likely hypoventilation due to lethargy thought to be c/w hypothyroidism on top of hx of COPD.     -No current issues, currently on room air   -Supplemental oxygen to keep saturation above 92 %.  - Incentive spirometer every 15- 30 minutes when awake.  Plan   -Monitor mental status, recheck VBG is warranted     # History of Tuberculosis   - known TB infection in the 70s for which she was treated. Per - patient needs three negative sputum cultures to r/o active TB two negative cultures 3rd is pending  Plan   - per protocol cont negative pressure room.   -repeat sputum culture pending   -if negative, pt can be removed from isolation      Cardiovascular:    #Hypotension   Ddx: unknown thought to be secondary to hypovolemia vs hypothyroidism less likely sepsis given clinical picture   -s/p 750 ml  without adequate response  -Bedside ECHO form ED indicated adequate squeeze, w/ formal ECHO from 12/11 EF 60-65%  -IVC on bedside ECHO today, collapsible estimated at 1.3cm   -Not currently on midodrine at home prior to HD   Plan   -Give 25G of albumin 25% and 25G of 5% NOW  -Place central line for Norepi   -Repeated ECHO today   -Keep Maps >65   -Cont SDS with plan to wean tomorrow 1/27   []t/c midodrine pre HD in the future once TSH improves, and patient adequately resuscitated     #Hx of MI  #CAD   Pt had an MI on 5/18/11, current EKG benign with normal sinus rhythm and QTc 454.   -trop negative at 0.05.   -Continue PTA 81 mg ASA     GI care/ Nutrition   -Renal diet   -Stool softener     Fluids/ Electrolytes/ Nutrition:   - for IV fluid hydration   - No indication for parenteral nutrition.    Renal/ Fluid Balance:    RENAL / ELECTROLYTE  #ESRD (CKD 4) on HD (T, Th, Sat)    Per pt, she missed her Thursday session on 1/23/19   -during dialysis session 1/25, became severely hypotensive run was stopped   -R tunneled HD line w/ old left side fistula   Plan  -Cont CRRT w/ plan to transitional to HD tomorrow pending BP stability    -goal of even to 0 for now given hypovolemia   -Nephro consult     #Hyperkalemia   -Unknown specific reason, attempted HD yesterday however stopped due to hypotension.   -attempted to be shifted multiple times in the ED however was refractory   Plan   -Per renal, 2K baths, and increased Dialysate rate   -BMP Q4 hrs     Endocrine:    #Hypothyroidism  Per chart review, her thyroid function has not been normal.   Per history she is non-compliance with her medication for about 2 weeks    -TSH 97.20, free T4 0.23 on 1/25.  -No s/s of  myxedema coma at this time   Plan   -discontinue PO and Start IV levothyroxine 150 mg daily per endo recs   - Checking total T3 and free T4 tomorrow    #DM2   No PTA meds on med list. Current blood glucose stable at 115.   -on sliding scale   -on hypoglycemia  protocol     ID/ Antibiotics:  #Concern for potential sepsis   -A.Febrile, no leukocytosis, procal 0.17   -Unclear reason specially for hypotension as not super suspicious of sepsis   Workup: Flu -/MRSA -,  blood, sputum cx, and RVP pending  - Random Cortisol 11,    Antimicrobials/Antivirals:  Zosyn (1/25) and Vanc 1/25   Plan   -follow cx, RVP, and AFB  -Holding ABX for now given improving clinically   -Monitor indwelling HD line for s/s of infection (unknown date of placement)   Heme:     No current issues     Prophylaxis:    - Mechanical prophylaxis for DVT. subcutaneous Heparin, SCD     Lines/ tubes/ drains:  -R Tunnled HD   -L CVC   -PIV    Disposition:  MICU- if pressors off throughout the day, and remains HD on HD can transfer to the floor potential tomorrow afternoon     Patient seen, findings and plan discussed with MICU staff, Dr. Hoyt  Critical care time: >45 minutes    Margret Abel MICU JILLIAN      OBJECTIVE:   1. VITAL SIGNS:   Temp:  [97.3  F (36.3  C)-98.7  F (37.1  C)] 97.3  F (36.3  C)  Pulse:  [51-87] 61  Heart Rate:  [51-86] 70  Resp:  [7-28] 15  BP: ()/() 117/66  SpO2:  [91 %-100 %] 92 %  Resp: 15      2. INTAKE/ OUTPUT:   I/O last 3 completed shifts:  In: 1332.09 [P.O.:240; I.V.:1210.09]  Out: 931 [Urine:330; Other:601]    3. PHYSICAL EXAMINATION:   GENERAL APPEARANCE: NAD  EYES:  no scleral icterus, pupils equal  PULM: lungs clear   CV: regular rhythm, normal rate, no rub    -edema - none  GI: soft, NTND  Neuro: alert, intact, moving all extremities    Access: TDC and previously unused AVF + thrill +bruit     Imaging personally reviewed:  ECG SR  4. INVESTIGATIONS:   Venous Blood Gases   7.30/50/25   Complete Blood Count   Recent Labs   Lab 01/26/20  0550 01/25/20  1308 01/25/20  0815 01/25/20  0814   WBC 5.5  --  9.4  --    HGB 10.3* 11.9 11.9 12.6     --  279  --      Basic Metabolic Panel  Recent Labs   Lab 01/26/20  1030 01/26/20  0841 01/26/20  0719 01/26/20  0550  01/25/20  1808     --  133 Canceled, Test credited 137   POTASSIUM 6.1* 6.1* 6.8* Canceled, Test credited 5.7*   CHLORIDE 107  --  106 Canceled, Test credited 104   CO2 26  --  24 Canceled, Test credited 24   BUN 34*  --  39* Canceled, Test credited 70*   CR 3.88*  --  4.32* Canceled, Test credited 7.48*   *  --  113* Canceled, Test credited 108*     Liver Function Tests  Recent Labs   Lab 01/26/20  0719 01/26/20  0550   AST 32 Canceled, Test credited   ALT 21 Canceled, Test credited   ALKPHOS 125 Canceled, Test credited   BILITOTAL 0.4 Canceled, Test credited   ALBUMIN 2.5* Canceled, Test credited     Pancreatic Enzymes  Recent Labs   Lab 01/26/20  1030   LIPASE 151     TSH  97.20  T4 0.23  T3 29  Cortisol 11  Lipase 151  Procal 0.17     5. RADIOLOGY:   Recent Results (from the past 24 hour(s))   XR Chest Port 1 View    Narrative     Examination:  XR CHEST PORT 1 VW     Date:  1/26/2020 11:01 AM      Clinical Information: line placement     Additional Information: none    Comparison: 1/25/2020, 8:10 AM chest x-ray.    Findings:     There is new left IJ line with the tip projecting in the lower SVC.  The right-sided multiport catheter has a tip projected in the lower  SVC. Left-sided pleural effusion is present and there is left basilar  atelectasis. Mild interstitial edema is suspected.      Impression    Impression:    1. The left IJ line the tip projecting in lower SVC.  2. Stable mild interstitial edema.  3. Small left-sided pleural effusion, little increased over previous  study..    FREDO OLEARY MD       =========================================   2

## 2020-01-26 NOTE — PROGRESS NOTES
Lab notified of modified Lab orders from PCU to Lab collect, requested STAT draw. No technician from initial call at 0415; Lab called at 0515 to request technician again; Lab states unable to provider technician at this time. RAJESH RN notified d/t clinical nature, RN sent to room to collect labs. Labs collect at 0555, sent stat to lab. Now awaiting results.

## 2020-01-26 NOTE — PLAN OF CARE
"Admitted/transferred from: ED  Reason for admission/transfer: CRRT/ pressor requirements   Patient status upon admission/transfer: Alert. Levophed infusing through PIV to maintain MAP >65. RA. Denies pain.   Interventions: CRRT started. MICU notified of levophed running through PIV >2hrs.   Plan: Labs. CRRT I=O. Central line placement  2 RN skin assessment: completed by Janet Yanez  Result of skin assessment and interventions/actions: left arm wound - appears to be picked scab. Blanchable redness coccyx.   Height, weight, drug calc weight: done  Patient belongings: Pill bottles X3 sent to security. Purse with cards, dentures, red glasses kept in room per patient request. Black jacket, clothes and boots also kept in room.   MDRO education (if applicable): done    N: Alert, oriented but forgetful. Sleeping on/off - easily aroused. Pupils equal and reactive - glasses for reading, nystagmus when tracking left/right. Generalized twitching in all extremities, pt states \"normal for me\". Denies numbness, tingling. No pain reported.    C: NSR with occasional PVCs. Levophed titrated off. Pulses palpable. Afebrile. No edema.   R: RA. Good congested cough. No sputum observed.   GI: Regular diet, good appetite. LBM PTA  : Right arm fistula. Right subclavian tunneled dialysis line - CRRT started, I=O.   ACCESS: PIV X3, Right dialysis catheter.  GTTS: D10@75   ACTIVITY: Helps turn q2 in bed.     PLAN: Central line placement? CRRT, follow labs closely. Notify MD of changes/concerns.   "
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
## 2020-01-26 NOTE — PROGRESS NOTES
SPIRITUAL HEALTH SERVICES  SPIRITUAL ASSESSMENT Progress Note  [facility] ([place]) 4A     REFERRAL SOURCE: Consult    Tried to visit twice. Pt was eating and then busy with nursing.     PLAN: Will try again today or tomorrow.    Massimo Jean-Baptiste M.Div.     Pager [phone]

## 2020-01-26 NOTE — PROGRESS NOTES
Notified Renal MD Milton that K was 5.6 (previous 5.7). MD will write order to switch to 2K bath. Notified by lab that sample may be contamination as well, lab will re-check ASAP- update bedside RN.     TERRI Virgen[employer] resource RN

## 2020-01-26 NOTE — PLAN OF CARE
D/I: Patient on unit 4A Surgical/Neuro ICU     Neuro: Alert/Lethargic, Oriented x 2, disoriented to time and situation, very forgetful; denies numbness and tingling; c/o back and leg pain, admin Oxy x 2; Follows commands; PERRLA 2mm.   CV:  Afebrile; SB-SR with occasional PVCs; BP labile, no Levo overnight; +2 pulses. R. AV fistula with Bruit and thrill.   Pulm: RA, sating high 90s; frequent non-productive cough.   GI: Regular diet, excellent appetite; No BM overnight, passing gas.   Endo: Q4H checks, 127-150.   : Voids, using bed pan;   CRRT: I=O, occasionally pulled net positive d/t soft BPs.   IT:  L. Arm scab; Preventative Mepilex on coccyx.   LADs:  R. Subclavian Tunneled HD; L PIV x 2;   Gtt: D10% 75  Labs: Several issues with Lab staff overnight. Lab we cancelled d/t an error.     A: Stable    Plan: Will continue to monitor Pt closely and notify MD of any significant changes.

## 2020-01-26 NOTE — PROGRESS NOTES
CRRT STATUS NOTE    DATA:  Time:  4:34 AM  Pressures WNL:  YES  Filter Status:  WDL  Problems Reported/Alarms Noted:  none  Supplies Present:  YES    ASSESSMENT:  Patient Net Fluid Balance:  + 174 at 0600  Vital Signs:  Afebrile, Sinus 55-70, MAP 65-90, Room air SpO2 %   Labs:  K now 5.6 ( Renal MD paged and bedside RN notified MICU team) Plan to switch to 2K bath in AM.  Currently pt is on a 4K bath. Ca ion 3.9 (needs to be replaced)  Goals of Therapy:  I=O     INTERVENTIONS: No acute interventions needed overnight.    PLAN: Continue to pull fluid per orders and as patient tolerates. Notify CRRT resource RN with any concerns/ questions [id number].

## 2020-01-26 NOTE — PLAN OF CARE
D/I/A: Pt on MICU for CRRT treatment 2/2 K+ level 6.7 yesterday upon arrival to ED from dialysis with hypotension.  On pressors on/off overnight.  Given total 75g albumin, combination 25% and 5% for fluid status this AM.        Neuro: Forgetful, A/O x4.  Nystagmus and dysconjugate gaze.  Remains tremulous.      CV: Frequent ectopy, PVCs with couplets/triplets/bigeminy followed by short pause.  Occasional short runs < 6 beats VT.  Team aware.  BP remains stable throughout.  Denies chest pain.    Resp: Remains on room air, although c/o SOB this afternoon.  Sats remain > 95%, highest RR 22.  MICU notified at this time, further labs ordered.  Fine crackles in bases.  Productive cough.      GI: Regular diet, adequate PO.  BM x2.    : Voiding via bedpan  CRRT: Per MICU and renal, not pulling fluid.  Changed to 2K+ bath this AM with increase in PBP and dialysate.  K+ is now WNL.    Line access:  L internal jugular placed this AM for pressors.  PIV x1 to LUE.    Skin: Scabbing to LFA, coccyx with blanchable redness.  Otherwise gavin throughout.    Gtts: None  Lytes: Last K+ level 5.2, awaiting 1600 results.    Plan: Continue to monitor and notify MD with changes.

## 2020-01-27 ENCOUNTER — PATIENT OUTREACH (OUTPATIENT)
Dept: GERIATRIC MEDICINE | Facility: CLINIC | Age: 75
End: 2020-01-27

## 2020-01-27 ENCOUNTER — APPOINTMENT (OUTPATIENT)
Dept: CARDIOLOGY | Facility: CLINIC | Age: 75
DRG: 871 | End: 2020-01-27
Payer: COMMERCIAL

## 2020-01-27 ENCOUNTER — APPOINTMENT (OUTPATIENT)
Dept: GENERAL RADIOLOGY | Facility: CLINIC | Age: 75
DRG: 871 | End: 2020-01-27
Payer: COMMERCIAL

## 2020-01-27 LAB
ALBUMIN SERPL-MCNC: 3.5 G/DL (ref 3.4–5)
ALP SERPL-CCNC: 128 U/L (ref 40–150)
ALT SERPL W P-5'-P-CCNC: 51 U/L (ref 0–50)
ANION GAP SERPL CALCULATED.3IONS-SCNC: 2 MMOL/L (ref 3–14)
ANION GAP SERPL CALCULATED.3IONS-SCNC: 4 MMOL/L (ref 3–14)
AST SERPL W P-5'-P-CCNC: 47 U/L (ref 0–45)
BILIRUB SERPL-MCNC: 0.3 MG/DL (ref 0.2–1.3)
BUN SERPL-MCNC: 12 MG/DL (ref 7–30)
BUN SERPL-MCNC: 15 MG/DL (ref 7–30)
CA-I SERPL ISE-MCNC: 5 MG/DL (ref 4.4–5.2)
CA-I SERPL ISE-MCNC: 5.1 MG/DL (ref 4.4–5.2)
CALCIUM SERPL-MCNC: 9.3 MG/DL (ref 8.5–10.1)
CALCIUM SERPL-MCNC: 9.8 MG/DL (ref 8.5–10.1)
CHLORIDE SERPL-SCNC: 106 MMOL/L (ref 94–109)
CHLORIDE SERPL-SCNC: 106 MMOL/L (ref 94–109)
CO2 SERPL-SCNC: 27 MMOL/L (ref 20–32)
CO2 SERPL-SCNC: 28 MMOL/L (ref 20–32)
CREAT SERPL-MCNC: 1.36 MG/DL (ref 0.52–1.04)
CREAT SERPL-MCNC: 1.69 MG/DL (ref 0.52–1.04)
ERYTHROCYTE [DISTWIDTH] IN BLOOD BY AUTOMATED COUNT: 15.4 % (ref 10–15)
ERYTHROCYTE [DISTWIDTH] IN BLOOD BY AUTOMATED COUNT: 15.7 % (ref 10–15)
GFR SERPL CREATININE-BSD FRML MDRD: 29 ML/MIN/{1.73_M2}
GFR SERPL CREATININE-BSD FRML MDRD: 38 ML/MIN/{1.73_M2}
GLUCOSE BLDC GLUCOMTR-MCNC: 119 MG/DL (ref 70–99)
GLUCOSE BLDC GLUCOMTR-MCNC: 151 MG/DL (ref 70–99)
GLUCOSE BLDC GLUCOMTR-MCNC: 173 MG/DL (ref 70–99)
GLUCOSE BLDC GLUCOMTR-MCNC: 77 MG/DL (ref 70–99)
GLUCOSE BLDC GLUCOMTR-MCNC: 89 MG/DL (ref 70–99)
GLUCOSE SERPL-MCNC: 133 MG/DL (ref 70–99)
GLUCOSE SERPL-MCNC: 134 MG/DL (ref 70–99)
HCT VFR BLD AUTO: 35.5 % (ref 35–47)
HCT VFR BLD AUTO: 36 % (ref 35–47)
HGB BLD-MCNC: 11 G/DL (ref 11.7–15.7)
HGB BLD-MCNC: 11.4 G/DL (ref 11.7–15.7)
INTERPRETATION ECG - MUSE: NORMAL
INTERPRETATION ECG - MUSE: NORMAL
MAGNESIUM SERPL-MCNC: 1.9 MG/DL (ref 1.6–2.3)
MCH RBC QN AUTO: 31.3 PG (ref 26.5–33)
MCH RBC QN AUTO: 32.4 PG (ref 26.5–33)
MCHC RBC AUTO-ENTMCNC: 31 G/DL (ref 31.5–36.5)
MCHC RBC AUTO-ENTMCNC: 31.7 G/DL (ref 31.5–36.5)
MCV RBC AUTO: 101 FL (ref 78–100)
MCV RBC AUTO: 102 FL (ref 78–100)
PHOSPHATE SERPL-MCNC: 2.8 MG/DL (ref 2.5–4.5)
PLATELET # BLD AUTO: 200 10E9/L (ref 150–450)
PLATELET # BLD AUTO: 207 10E9/L (ref 150–450)
POTASSIUM SERPL-SCNC: 4.1 MMOL/L (ref 3.4–5.3)
POTASSIUM SERPL-SCNC: 4.3 MMOL/L (ref 3.4–5.3)
PROT SERPL-MCNC: 6.9 G/DL (ref 6.8–8.8)
RBC # BLD AUTO: 3.52 10E12/L (ref 3.8–5.2)
RBC # BLD AUTO: 3.52 10E12/L (ref 3.8–5.2)
SODIUM SERPL-SCNC: 136 MMOL/L (ref 133–144)
SODIUM SERPL-SCNC: 137 MMOL/L (ref 133–144)
T3 SERPL-MCNC: 31 NG/DL (ref 60–181)
T4 FREE SERPL-MCNC: 0.38 NG/DL (ref 0.76–1.46)
TROPONIN I SERPL-MCNC: 0.04 UG/L (ref 0–0.04)
TROPONIN I SERPL-MCNC: 0.04 UG/L (ref 0–0.04)
WBC # BLD AUTO: 14.5 10E9/L (ref 4–11)
WBC # BLD AUTO: 8.6 10E9/L (ref 4–11)

## 2020-01-27 PROCEDURE — 80048 BASIC METABOLIC PNL TOTAL CA: CPT | Performed by: STUDENT IN AN ORGANIZED HEALTH CARE EDUCATION/TRAINING PROGRAM

## 2020-01-27 PROCEDURE — 93010 ELECTROCARDIOGRAM REPORT: CPT | Mod: 76 | Performed by: INTERNAL MEDICINE

## 2020-01-27 PROCEDURE — 25000128 H RX IP 250 OP 636: Performed by: STUDENT IN AN ORGANIZED HEALTH CARE EDUCATION/TRAINING PROGRAM

## 2020-01-27 PROCEDURE — 99233 SBSQ HOSP IP/OBS HIGH 50: CPT | Performed by: INTERNAL MEDICINE

## 2020-01-27 PROCEDURE — 93005 ELECTROCARDIOGRAM TRACING: CPT

## 2020-01-27 PROCEDURE — 87015 SPECIMEN INFECT AGNT CONCNTJ: CPT | Performed by: STUDENT IN AN ORGANIZED HEALTH CARE EDUCATION/TRAINING PROGRAM

## 2020-01-27 PROCEDURE — 25000125 ZZHC RX 250: Performed by: STUDENT IN AN ORGANIZED HEALTH CARE EDUCATION/TRAINING PROGRAM

## 2020-01-27 PROCEDURE — 5A1D70Z PERFORMANCE OF URINARY FILTRATION, INTERMITTENT, LESS THAN 6 HOURS PER DAY: ICD-10-PCS | Performed by: INTERNAL MEDICINE

## 2020-01-27 PROCEDURE — 85027 COMPLETE CBC AUTOMATED: CPT

## 2020-01-27 PROCEDURE — 25000132 ZZH RX MED GY IP 250 OP 250 PS 637: Performed by: STUDENT IN AN ORGANIZED HEALTH CARE EDUCATION/TRAINING PROGRAM

## 2020-01-27 PROCEDURE — 80053 COMPREHEN METABOLIC PANEL: CPT | Performed by: STUDENT IN AN ORGANIZED HEALTH CARE EDUCATION/TRAINING PROGRAM

## 2020-01-27 PROCEDURE — 90937 HEMODIALYSIS REPEATED EVAL: CPT

## 2020-01-27 PROCEDURE — 87206 SMEAR FLUORESCENT/ACID STAI: CPT | Performed by: STUDENT IN AN ORGANIZED HEALTH CARE EDUCATION/TRAINING PROGRAM

## 2020-01-27 PROCEDURE — 36415 COLL VENOUS BLD VENIPUNCTURE: CPT

## 2020-01-27 PROCEDURE — 93325 DOPPLER ECHO COLOR FLOW MAPG: CPT | Mod: 26 | Performed by: INTERNAL MEDICINE

## 2020-01-27 PROCEDURE — 25000132 ZZH RX MED GY IP 250 OP 250 PS 637: Performed by: NURSE PRACTITIONER

## 2020-01-27 PROCEDURE — 93321 DOPPLER ECHO F-UP/LMTD STD: CPT | Mod: 26 | Performed by: INTERNAL MEDICINE

## 2020-01-27 PROCEDURE — 84100 ASSAY OF PHOSPHORUS: CPT | Performed by: STUDENT IN AN ORGANIZED HEALTH CARE EDUCATION/TRAINING PROGRAM

## 2020-01-27 PROCEDURE — 00000146 ZZHCL STATISTIC GLUCOSE BY METER IP

## 2020-01-27 PROCEDURE — 99291 CRITICAL CARE FIRST HOUR: CPT

## 2020-01-27 PROCEDURE — 84439 ASSAY OF FREE THYROXINE: CPT | Performed by: STUDENT IN AN ORGANIZED HEALTH CARE EDUCATION/TRAINING PROGRAM

## 2020-01-27 PROCEDURE — 93308 TTE F-UP OR LMTD: CPT | Mod: 26 | Performed by: INTERNAL MEDICINE

## 2020-01-27 PROCEDURE — 90947 DIALYSIS REPEATED EVAL: CPT

## 2020-01-27 PROCEDURE — 84480 ASSAY TRIIODOTHYRONINE (T3): CPT | Performed by: STUDENT IN AN ORGANIZED HEALTH CARE EDUCATION/TRAINING PROGRAM

## 2020-01-27 PROCEDURE — 12000001 ZZH R&B MED SURG/OB UMMC

## 2020-01-27 PROCEDURE — 71045 X-RAY EXAM CHEST 1 VIEW: CPT

## 2020-01-27 PROCEDURE — 83735 ASSAY OF MAGNESIUM: CPT | Performed by: STUDENT IN AN ORGANIZED HEALTH CARE EDUCATION/TRAINING PROGRAM

## 2020-01-27 PROCEDURE — 87116 MYCOBACTERIA CULTURE: CPT | Performed by: STUDENT IN AN ORGANIZED HEALTH CARE EDUCATION/TRAINING PROGRAM

## 2020-01-27 PROCEDURE — 85027 COMPLETE CBC AUTOMATED: CPT | Performed by: STUDENT IN AN ORGANIZED HEALTH CARE EDUCATION/TRAINING PROGRAM

## 2020-01-27 PROCEDURE — 84484 ASSAY OF TROPONIN QUANT: CPT | Performed by: STUDENT IN AN ORGANIZED HEALTH CARE EDUCATION/TRAINING PROGRAM

## 2020-01-27 PROCEDURE — 25000128 H RX IP 250 OP 636: Performed by: NURSE PRACTITIONER

## 2020-01-27 PROCEDURE — 40000141 ZZH STATISTIC PERIPHERAL IV START W/O US GUIDANCE

## 2020-01-27 PROCEDURE — 93321 DOPPLER ECHO F-UP/LMTD STD: CPT

## 2020-01-27 PROCEDURE — 84484 ASSAY OF TROPONIN QUANT: CPT

## 2020-01-27 PROCEDURE — 82330 ASSAY OF CALCIUM: CPT | Performed by: STUDENT IN AN ORGANIZED HEALTH CARE EDUCATION/TRAINING PROGRAM

## 2020-01-27 PROCEDURE — 25800030 ZZH RX IP 258 OP 636

## 2020-01-27 RX ORDER — LEVOTHYROXINE SODIUM ANHYDROUS 100 UG/5ML
125 INJECTION, POWDER, LYOPHILIZED, FOR SOLUTION INTRAVENOUS DAILY
Status: DISCONTINUED | OUTPATIENT
Start: 2020-01-27 | End: 2020-01-28

## 2020-01-27 RX ORDER — NICOTINE POLACRILEX 4 MG
15-30 LOZENGE BUCCAL
Status: DISCONTINUED | OUTPATIENT
Start: 2020-01-27 | End: 2020-01-27

## 2020-01-27 RX ORDER — CEFTRIAXONE 2 G/1
2 INJECTION, POWDER, FOR SOLUTION INTRAMUSCULAR; INTRAVENOUS EVERY 24 HOURS
Status: DISCONTINUED | OUTPATIENT
Start: 2020-01-27 | End: 2020-02-01 | Stop reason: HOSPADM

## 2020-01-27 RX ORDER — LEVOTHYROXINE SODIUM ANHYDROUS 100 UG/5ML
150 INJECTION, POWDER, LYOPHILIZED, FOR SOLUTION INTRAVENOUS DAILY
Status: DISCONTINUED | OUTPATIENT
Start: 2020-01-27 | End: 2020-01-27

## 2020-01-27 RX ORDER — LABETALOL 20 MG/4 ML (5 MG/ML) INTRAVENOUS SYRINGE
10 ONCE
Status: COMPLETED | OUTPATIENT
Start: 2020-01-27 | End: 2020-01-27

## 2020-01-27 RX ORDER — DEXTROSE MONOHYDRATE 25 G/50ML
25-50 INJECTION, SOLUTION INTRAVENOUS
Status: DISCONTINUED | OUTPATIENT
Start: 2020-01-27 | End: 2020-01-27

## 2020-01-27 RX ORDER — AZITHROMYCIN 250 MG/1
250 TABLET, FILM COATED ORAL DAILY
Status: COMPLETED | OUTPATIENT
Start: 2020-01-28 | End: 2020-01-31

## 2020-01-27 RX ORDER — AZITHROMYCIN 500 MG/1
500 TABLET, FILM COATED ORAL ONCE
Status: COMPLETED | OUTPATIENT
Start: 2020-01-27 | End: 2020-01-27

## 2020-01-27 RX ORDER — LEVOTHYROXINE SODIUM 125 UG/1
125 TABLET ORAL
Status: DISCONTINUED | OUTPATIENT
Start: 2020-01-28 | End: 2020-01-27

## 2020-01-27 RX ADMIN — SODIUM CHLORIDE 250 ML: 9 INJECTION, SOLUTION INTRAVENOUS at 21:07

## 2020-01-27 RX ADMIN — OXYCODONE HYDROCHLORIDE 5 MG: 5 TABLET ORAL at 05:59

## 2020-01-27 RX ADMIN — MELATONIN 2000 UNITS: at 08:17

## 2020-01-27 RX ADMIN — LEVOTHYROXINE SODIUM ANHYDROUS 150 MCG: 100 INJECTION, POWDER, LYOPHILIZED, FOR SOLUTION INTRAVENOUS at 10:09

## 2020-01-27 RX ADMIN — CALCIUM CHLORIDE, MAGNESIUM CHLORIDE, DEXTROSE MONOHYDRATE, LACTIC ACID, SODIUM CHLORIDE, SODIUM BICARBONATE AND POTASSIUM CHLORIDE 20 ML/KG/HR: 5.15; 2.03; 22; 5.4; 6.46; 3.09; .157 INJECTION INTRAVENOUS at 04:29

## 2020-01-27 RX ADMIN — HYDROCORTISONE SODIUM SUCCINATE 50 MG: 100 INJECTION, POWDER, FOR SOLUTION INTRAMUSCULAR; INTRAVENOUS at 08:15

## 2020-01-27 RX ADMIN — Medication 5000 UNITS: at 11:06

## 2020-01-27 RX ADMIN — Medication 1 MG: at 20:21

## 2020-01-27 RX ADMIN — CALCIUM CHLORIDE, MAGNESIUM CHLORIDE, DEXTROSE MONOHYDRATE, LACTIC ACID, SODIUM CHLORIDE, SODIUM BICARBONATE AND POTASSIUM CHLORIDE 20 ML/KG/HR: 5.15; 2.03; 22; 5.4; 6.46; 3.09; .157 INJECTION INTRAVENOUS at 08:10

## 2020-01-27 RX ADMIN — HYDROCORTISONE SODIUM SUCCINATE 50 MG: 100 INJECTION, POWDER, FOR SOLUTION INTRAMUSCULAR; INTRAVENOUS at 19:47

## 2020-01-27 RX ADMIN — CALCIUM CHLORIDE, MAGNESIUM CHLORIDE, DEXTROSE MONOHYDRATE, LACTIC ACID, SODIUM CHLORIDE, SODIUM BICARBONATE AND POTASSIUM CHLORIDE 20 ML/KG/HR: 5.15; 2.03; 22; 5.4; 6.46; 3.09; .157 INJECTION INTRAVENOUS at 00:45

## 2020-01-27 RX ADMIN — UMECLIDINIUM 1 PUFF: 62.5 AEROSOL, POWDER ORAL at 08:23

## 2020-01-27 RX ADMIN — Medication 1 CAPSULE: at 11:13

## 2020-01-27 RX ADMIN — SODIUM CHLORIDE 300 ML: 9 INJECTION, SOLUTION INTRAVENOUS at 21:06

## 2020-01-27 RX ADMIN — FLUTICASONE PROPIONATE AND SALMETEROL 1 PUFF: 50; 250 POWDER RESPIRATORY (INHALATION) at 10:12

## 2020-01-27 RX ADMIN — ACETAMINOPHEN 650 MG: 325 TABLET, FILM COATED ORAL at 04:38

## 2020-01-27 RX ADMIN — Medication 2 G: at 05:45

## 2020-01-27 RX ADMIN — ASPIRIN 81 MG CHEWABLE TABLET 81 MG: 81 TABLET CHEWABLE at 08:18

## 2020-01-27 RX ADMIN — Medication 5000 UNITS: at 22:04

## 2020-01-27 RX ADMIN — LABETALOL 20 MG/4 ML (5 MG/ML) INTRAVENOUS SYRINGE 10 MG: at 16:56

## 2020-01-27 RX ADMIN — HYDROCORTISONE SODIUM SUCCINATE 50 MG: 100 INJECTION, POWDER, FOR SOLUTION INTRAMUSCULAR; INTRAVENOUS at 03:58

## 2020-01-27 RX ADMIN — ACETAMINOPHEN 650 MG: 325 TABLET, FILM COATED ORAL at 11:13

## 2020-01-27 RX ADMIN — CEFTRIAXONE 2 G: 2 INJECTION, POWDER, FOR SOLUTION INTRAMUSCULAR; INTRAVENOUS at 11:06

## 2020-01-27 RX ADMIN — OXYCODONE HYDROCHLORIDE 5 MG: 5 TABLET ORAL at 14:12

## 2020-01-27 RX ADMIN — FLUTICASONE PROPIONATE AND SALMETEROL 1 PUFF: 50; 250 POWDER RESPIRATORY (INHALATION) at 22:05

## 2020-01-27 RX ADMIN — Medication: at 08:24

## 2020-01-27 RX ADMIN — ATORVASTATIN CALCIUM 40 MG: 40 TABLET, FILM COATED ORAL at 22:04

## 2020-01-27 RX ADMIN — AZITHROMYCIN 500 MG: 500 TABLET, FILM COATED ORAL at 10:09

## 2020-01-27 RX ADMIN — OXYCODONE HYDROCHLORIDE 5 MG: 5 TABLET ORAL at 22:13

## 2020-01-27 ASSESSMENT — PAIN DESCRIPTION - DESCRIPTORS: DESCRIPTORS: TIGHTNESS

## 2020-01-27 ASSESSMENT — ACTIVITIES OF DAILY LIVING (ADL)
RETIRED_COMMUNICATION: 0-->UNDERSTANDS/COMMUNICATES WITHOUT DIFFICULTY
COGNITION: 1 - ATTENTION OR MEMORY DEFICITS
DRESS: 0-->INDEPENDENT
TOILETING: 0-->INDEPENDENT
ADLS_ACUITY_SCORE: 20
ADLS_ACUITY_SCORE: 20
RETIRED_EATING: 0-->INDEPENDENT
AMBULATION: 1-->ASSISTIVE EQUIPMENT
ADLS_ACUITY_SCORE: 20
TRANSFERRING: 1-->ASSISTIVE EQUIPMENT
ADLS_ACUITY_SCORE: 20
FALL_HISTORY_WITHIN_LAST_SIX_MONTHS: NO
SWALLOWING: 0-->SWALLOWS FOODS/LIQUIDS WITHOUT DIFFICULTY
BATHING: 0-->INDEPENDENT

## 2020-01-27 ASSESSMENT — MIFFLIN-ST. JEOR
SCORE: 1188.5
SCORE: 1227.5

## 2020-01-27 NOTE — PROGRESS NOTES
"CRRT STATUS NOTE    DATA:  Time:  6:01 AM  Pressures WNL:  YES  Filter Status:  WDL    Problems Reported/Alarms Noted:  None at this time.    Supplies Present:  YES    ASSESSMENT:  Patient Net Fluid Balance:  Net negative 154 since midnight  Vital Signs: BP (!) 171/89 (BP Location: Left arm)   Pulse 78   Temp 97.3  F (36.3  C) (Oral)   Resp 21   Ht 1.727 m (5' 8\")   Wt 68.5 kg (151 lb 0.2 oz)   SpO2 94%   BMI 22.96 kg/m      Labs:  Mag, phos to be replaced.  Goals of Therapy:  I=O.     INTERVENTIONS:  Electrolyte replacements.    PLAN:   ECHO this AM due to frequent ectopy. Continue to Monitor, update MD with changes and concerns.    "

## 2020-01-27 NOTE — PLAN OF CARE
PT 4A: Cancel- PT orders received. Patient unavailable for therapy this morning, having central line placed in order to initiate pressors. Unable to check back this afternoon. Will reschedule PT evaluation.

## 2020-01-27 NOTE — PROVIDER NOTIFICATION
Pt states her shortness of breath is getting worse and her left chest is still tight.  Pt shallow breathing, tachypneic at 30.  Denies radiation of chest pain. Pt hypertensive with some elevated diastolics (see flowsheets).  Pt on room air.  MICU notified.    P: 12 lead EKG and troponin.

## 2020-01-27 NOTE — PLAN OF CARE
Pt on 4A  Shift [id number]  D/I/A     Significant events: Levo remained off overnight. She continued to have frequent ectopy and complain of intermittent SOB, MICU notified, CXR and 12 lead EKG obtained, no new interventions, plan for ECHO this AM.   Neuro: A&O X4, forgetful, dysconjugate gaze.   CV: SR with frequent ectopy, PVCs/PACs/ VT runs 3-5 beats BP stable- no interventions- MD aware of hypertension- to notify for PRNs if sustained SBP >180.   Pulm: RA- continues to complain of SOB overnight SPO2  Maintained 95+ without supplemental O2  GI: Reg diet, BM X1 soft, Senna held overnight  : Voids using bedpan  Access: R subclavian Dialysis line, L TL internal jugular, R dialysis fistual  CRRT: goal I=O, pulling 0 overnight- minimal intake and voids.  Labs: K 4.3, Mg 1.9- replaced.      Plan: ECHO this AM due to frequent ectopy. Continue to Monitor, update MD with changes and concerns.   See flowsheets for additional documentation and interventions.

## 2020-01-27 NOTE — PROVIDER NOTIFICATION
Pt now on 2 L oxygen to keep Sats>90%.  Tachypnea 30s, swallow breathing, diaphoretic. Hypertensive 190/98. MICU notified and requested to come to bedside.    P: Resident came and evaluated patient at bedside.  Ordered one time dose of labetolol and upper and lower extremity ultrasounds.

## 2020-01-27 NOTE — PROGRESS NOTES
MICU Progress/Transfer Note  Kim Anne MRN: [id number]  Age: 74 year old, : 20        ASSESSMENT & PLAN     Kim Anne is a 74 year old female with PMHx of MI, CAD, COPD, DM2, h/o TB s/p tx in the 1970s (repeat TB test + on 19), hypothyroidism, ESRD on HD (T, Th, Sat) who presented to the ED on 20 secondary to hypotension during hemodialysis.    Transfer Note:    Kim Anne is a 74 year old female with PMHx as above who came in due to severe hypotension during hemodialysis. She is s/p CRRT with stabilization of her BUN/CRT and potassium (initially hyperkalemic on admission). Pt was stable in the AM, but is now hypertensive and tachypneic. Currently requires 2L O2 via nasal cannula and is satting at 95%. Change in respiratory status most likely secondary to hypervolemia as patient received fluids for her initial hypotension and has missed two days of dialysis. Nephrology consulted prior to transfer. Plan to begin ultrafiltration tonight and HD tomorrow.      ===NEURO===  Sedation:  None      Analgesia:  - PTA 5 mg oxycodone Q8H PRN      # Memory impairment   Pt has difficulty with memory; can not recall why she missed her dialysis session or details of when she was diagnosed with certain medical conditions. Unclear if she has some baseline dementia, but her confusion can certainly be attributed to her severe hypothyroidism.   - mcg levothyroxine      ===CARDIOVASCULAR===     # Hypotension, resolved   Occurred in the setting of receiving hemodialysis. Most likely secondary to hypovolemia  as patient's BP improved with some fluids. Pt did require pressors on admission, but is currently off pressors. Her last echo on 19 was 60-65%. Repeat echo on this admission is normal with EF 60-65% and no regional wall motion abnormalities.      #Hx of MI  #CAD   Pt had an MI on 11. Denied chest pain in the AM, but later developed some chest tightness later  in the day. Repeat EKG with premature ventricular complexes and could not rule out anterior infarct. Trop negative.   -repeat trop pending   -Continue PTA 81 mg ASA         ===RESPIRATORY===   # Respiratory Acidosis, resolving   Last blood gas indicative of improving respiratory acidosis with pH 7.30, CO2 50, bicarb 25. Appears to be acute since bicarb is normal and not elevated, however given poor kidney function it could be chronic and bicarb is low because kidneys are unable to compensate. Currently she is saturating 95% on 2 LPM of oxygen via nasal cannula. CXR obtained in the ED shows no pulmonary edema, but notes a small left pleural effusion.   - continue to monitor, biPAP or intubate if patient worsens     #Respiratory decompensation   Earlier in the AM, patient's O2 was 97 on room air. Currently patient is having some desaturations and is on 2 L O2 via nasal cannula. Respiratory decompensation is most likely secondary to increasing volume overloaded state. Pt was initially hypovolemic as mentioned as a cause of her hypotension, however, she has now received fluid and is volume overloaded. More importantly, she has missed two days of dialysis and although she was on CRRT, no fluid was removed.   -nephrology consult   -plan for ultrafiltration tonight   -HD tomorrow     # C/f Active Tuberculosis   Pt's quantiferon TB gold positive on 5/5/19. Also positive on 10/25/17. Pt has known TB infection in the 70s for which she was treated. Per informal consult with ID, patient needs three negative sputum cultures to r/o active TB. She has since had two negative cultures, but not a third one. Currently, pt is in a negative pressure room. Although there is a not a great concern that patient has active TB (currently asx), it is still important to rule it out. Once three negative sputum cultures are obtained on this admission, patient can be removed from isolation.   -repeat sputum culture (X3) ordered   -if negative, pt  can be removed from isolation      #Hx of COPD   -continue PTA Spiriva     ===GASTROINTESTINAL===  #Nutrition   -Renal diet      ===RENAL / ELECTROLYTES===  #ESRD (CKD 4) on HD (T, Th, Sat)   Pt has hx of ESRD and is on hemodialysis on a T, Th, Sat schedule. Per pt, she missed her Thursday session on 1/23/19 but can not provide a reason. During her dialysis session today, she became severely hypotensive and her run was not completed. Pt is s/p CRRT on 1/25 and 1/26 without fluid removal (due to her initial presentation with hypovolemia). Now pt is hypervolemic and requiring ultrafiltration tonight with HD tomorrow.   -Nephro consult   -plan for ultrafiltration tonight and HD tomorrow   -daily BMPs   -monitor electrolytes      #hyperkalemia 2/2 ESRD, resolved   Presented with K+ of 6.4. Given insulin in the ED, current K+ still high at 6.2. EKG shows normal sinus rhythm, no peaked T waves. Received calcium gluconate. Pt started on CRRT, K+ should correct with CRRT.  1/27: resolved with K+ of 4.1         ===INFECTIOUS DISEASE===     # C/f Active Tuberculosis   As mentioned above, 3 negative sputum cultures needed to rule out active TB. Unlikely that pt has active TB as she is currently asymptomatic.    -negative pressure room; isolation   -repeat AFB x3; if negative can be removed from isolation      Antimicrobials/Antivirals:  Zosyn (1/25), Vanc (1/25)      Micro:  BC (1/25) - no growth at 2 days   Influenza A/B - negative  AFB - negative      ===HEMATOLOGY===  -None     ===ENDOCRINE===  #Hypothyroidism   Last TSH 97.20, free T4 0.23 on 1/25. Per pt she has not taken her levothyroxine for at least 2 weeks because she can not remember where it is. Can re-assess patient's need for IV levothyroxine tomorrow AM.   -125 mcg IV levothyroxine daily     #DM2   No PTA meds on med list. Current blood glucose stable at 134.   -on sliding scale   -on hypoglycemia protocol      ===SKIN/MSK===  -None         FEN: on renal diet    Prophylaxis:  DVT: heparin   GI: None   Lines: Right internal jugular, HD port right subclavian; left internal jugular removed on 1/27    Family: last updated 1/25   Disposition: ICU   Code Status: full      Patient was discussed with staff attending, Dr. Lester. Please see his addendum for any changes to the plan.      Jemma Barksdale MD   Internal Medicine, PGY-1   P: [phone]     Lines:   CVC Double Lumen 05/02/19 Right Internal jugular (Active)   Number of days: 270       CVC Double Lumen 01/25/20 Right Subclavian (Active)   Site Assessment WDL 1/27/2020 12:00 PM   Dressing Intervention Chlorhexidine sponge;Transparent 1/27/2020  4:00 AM   Dressing Change Due 02/02/20 1/27/2020 12:00 PM   CVC Comment HD 1/27/2020 12:00 PM   Lumen A - Color RED 1/27/2020 12:00 PM   Lumen A - Status saline locked 1/27/2020 12:00 PM   Lumen B - Color BLUE 1/27/2020 12:00 PM   Lumen B - Status saline locked 1/27/2020 12:00 PM   Number of days: 2       CVC Triple Lumen 01/26/20 Left Internal jugular (Active)   Site Assessment UTV 1/27/2020 12:00 PM   Dressing Intervention Chlorhexidine sponge;Transparent 1/27/2020 12:00 PM   Dressing Change Due 02/02/20 1/27/2020  8:00 AM   CVC Comment CDI 1/27/2020  4:00 AM   Lumen A - Color BLUE 1/27/2020 12:00 PM   Lumen A - Status saline locked 1/27/2020 12:00 PM   Lumen A - Cap Change Due 01/28/20 1/27/2020 12:00 PM   Lumen B - Color BROWN 1/27/2020 12:00 PM   Lumen B - Status saline locked 1/27/2020 12:00 PM   Lumen B - Cap Change Due 01/28/20 1/27/2020 12:00 PM   Lumen C - Color WHITE 1/27/2020 12:00 PM   Lumen C - Status saline locked 1/27/2020 12:00 PM   Lumen C - Cap Change Due 01/28/20 1/27/2020 12:00 PM   Number of days: 1       Hemodialysis Vascular Access Arteriovenous fistula Right Arm (Active)   Site Assessment WDL 1/27/2020 12:00 PM   Dressing Intervention Other (Comment) 1/26/2020  4:00 AM   Number of days: 66       Hemodialysis Vascular Access Subclavian vein catheter Right  "Subclavian (Active)   Site Assessment WDL 1/27/2020 12:00 PM   Dressing Intervention New dressing 1/26/2020  4:00 AM   Dressing Status Clean, dry, intact 1/27/2020 12:00 PM   Number of days: 2       Pressure Injury 05/02/19 Coccyx non-blanchable (Active)   Number of days: 270          Interval Events   Nursing notes reviewed. No acute events overnight. Pt is stable, alert, engaged in conversation in the AM. Did have increasing O2 requirements later on in the day, was put on 2 L NC.        Objective   VITAL SIGNS:  BP (!) 169/83   Pulse 81   Temp 98  F (36.7  C) (Oral)   Resp 30   Ht 1.727 m (5' 8\")   Wt 67.9 kg (149 lb 11.1 oz)   SpO2 94%   BMI 22.76 kg/m      Intake/Output Summary (Last 24 hours) at 1/27/2020 1440  Last data filed at 1/27/2020 1200  Gross per 24 hour   Intake 830 ml   Output 1791 ml   Net -961 ml     Wt:   Wt Readings from Last 2 Encounters:   01/27/20 67.9 kg (149 lb 11.1 oz)   12/25/19 61.7 kg (136 lb)        Vent settings:   Not on a vent.     BP - Mean:  [] 111    Gen: pleasant, no acute distress, frail appearing, covered in a bear hugger   HEENT: no icterus, MMM  Cardio: RRR, normal s1,s2, no murmurs, gallops or rubs.  Pulm: expiratory wheeze present   Abd: soft, non-tender to palpation, non distended, normoactive bowel sounds   Ext: no BLE noted   Skin: some thinning of her hair, skin does not appear dry, nails somewhat brittle   Neuro: alert and oriented x3 but sometimes forgetful, speech fluent/appropriate, motor grossly nonfocal    DIAGNOSTIC STUDIES:   BMP  Recent Labs   Lab Test 01/27/20  1000 01/27/20  0420 01/26/20  2209 01/26/20  1612 01/26/20  1400  01/26/20  0841  01/25/20  1307    137 138 135  --    < >  --    < >  --    POTASSIUM 4.1 4.3 4.9 5.3 5.2  5.4*   < > 6.1*   < > 6.1*   CHLORIDE 106 106 106 106  --    < >  --    < >  --    CO2 28 27 27 25  --    < >  --    < >  --    ANIONGAP 2* 4 5 4  --    < >  --    < >  --    LACT  --   --   --   --  1.8  --  2.1*  " --  1.5   BUN 12 15 20 24  --    < >  --    < >  --    CR 1.36* 1.69* 2.09* 2.73*  --    < >  --    < >  --    * 134* 138* 197*  --    < >  --    < >  --    FRANCES 9.8 9.3 9.1 8.9  --    < >  --    < >  --    MAG  --  1.9  --  2.0  --    < >  --    < >  --    PHOS  --  2.8  --  3.2  --    < >  --    < >  --     < > = values in this interval not displayed.     Heme   Recent Labs   Lab Test 01/27/20  0420 01/26/20  0550 01/25/20  1308 01/25/20  0815  11/23/19  1003 11/22/19  0832  05/22/19  0335   WBC 8.6 5.5  --  9.4   < > 12.1* 10.2   < > 7.6   ANEU  --  3.3  --  7.1   < > 10.6* 8.2   < >  --    ALYM  --  1.2  --  1.1   < > 0.8 1.2   < >  --    AEOS  --  0.5  --  0.6   < > 0.2 0.2   < >  --    HGB 11.0* 10.3* 11.9 11.9   < > 12.9 12.7   < > 7.0*   * 104*  --  105*   < > 97 95   < > 97    190  --  279   < > 235 242   < > 327   INR  --   --   --   --   --  1.02 0.97  --  0.99    < > = values in this interval not displayed.     Hepatic   Recent Labs   Lab 01/27/20  0420 01/26/20  0719 01/26/20  0550   ALKPHOS 128 125 Canceled, Test credited   AST 47* 32 Canceled, Test credited   ALT 51* 21 Canceled, Test credited   BILITOTAL 0.3 0.4 Canceled, Test credited   PROTTOTAL 6.9 5.9* Canceled, Test credited   ALBUMIN 3.5 2.5* Canceled, Test credited      Iron   Recent Labs   Lab Test 05/01/19  1320 02/16/18  0945 09/11/17  0928 01/11/17  0946 10/11/16  0842 06/18/14  1630 02/14/13  1207 12/05/12  1657   IRON 48 46 73 35 74 50 40 26*   * 163* 170* 193* 217* 265 266 270   IRONSAT 34 28 43 18 34 19 15 10*   * 134 127 105  --  86  --  47     ABG/VBG   Recent Labs   Lab Test 01/26/20  1030 01/26/20  0841 01/25/20  1808  01/05/12  2145   PH  --   --   --   --  7.31*   PCO2  --   --   --   --  31*   PO2  --   --   --   --  68*   O2PER 21 21 21  --  21   PHV 7.30* 7.22* 7.29*   < >  --    PCO2V 50 61* 50   < >  --     < > = values in this interval not displayed.       Urine Studies:   Recent Labs   Lab  Test 11/23/19  1235 05/01/19  2258   SG 1.015 1.006   URINEPH 7.5* 6.5   NITRITE Negative Negative   LEUKEST Negative Negative   WBCU 2 <1   RBCU 3* <1   PROTEIN 300* 100*       Microbiology:  No results found for: RS, FLUAG    Recent Labs   Lab Test 01/26/20  0932 01/25/20  1758 01/25/20  1753 01/25/20  0843 01/25/20  0815 11/25/19  1740 11/24/19  1233 11/24/19  1021 11/23/19  1425 11/23/19  1305   CULT Moderate growth  Normal reyna to date   No growth after 2 days  --  No growth after 2 days No growth after 2 days  --   --  Light growth  Normal reyna    Moderate growth  Candida albicans / dubliniensis  Candida albicans and Candida dubliniensis are not routinely speciated  Susceptibility testing not routinely done  * No growth  No growth No growth   SDES Sputum  Sputum Blood Blue port Nares Blood Left Hand Blood Left Arm Nares Urine Sputum  Sputum  Nares Blood Red port DIALYSIS  Blood Blue port DIALYSIS Blood Left Hand       Imaging:  Recent Results (from the past 24 hour(s))   XR Chest Port 1 View    Narrative    XR CHEST PORT 1 VW  1/27/2020 3:05 AM      HISTORY: CVC placement- having more ectopy    COMPARISON: 1/26/2020    FINDINGS: Right IJ central venous line tip is at the atriocaval  junction. Left IJ central venous line tip is at the mid right atrium.  Stent over the left upper mediastinum. Small left pleural effusion,  unchanged. Unchanged diffuse interstitial opacities.      Impression    IMPRESSION: Right IJ central venous line tip is at the atriocaval  junction. Left IJ central venous line tip is at the mid right atrium.    I have personally reviewed the examination and initial interpretation  and I agree with the findings.    RADHA LOYD DO   Echocardiogram Limited    Narrative    [id number]  [id number]  [id number]  [id number]^HIPOLITO^TONA^- SHANTEL MCMILLAN           [facility],[place]  Echocardiography Laboratory  [address] [place] [id number]     Name: KATHY  JONEL ODELL  MRN: [id number]  : 1945  Study Date: 2020 10:27 AM  Age: 74 yrs  Gender: Female  Patient Location: [facility]  Reason For Study: Other Cardiac Device In Situ  Ordering Physician: TONA MCMILLAN  Performed By: Guido Ventura RDCS     BSA: 1.8 m2  Height: 68 in  Weight: 151 lb  BP: 168/84 mmHg  _____________________________________________________________________________  __        Procedure  Limited Portable Echo Adult.  _____________________________________________________________________________  __        Interpretation Summary  Global and regional left ventricular function is normal with an EF of 60-65%.  Right ventricular function, chamber size, wall motion, and thickness are  normal.  Right ventricular systolic pressure is 53mmHg above the right atrial pressure.  IVC diameter and respiratory changes fall into an intermediate range  suggesting an RA pressure of 8 mmHg.  No pericardial effusion is present.  _____________________________________________________________________________  __        Left Ventricle  Global and regional left ventricular function is normal with an EF of 60-65%.  No regional wall motion abnormalities are seen.     Right Ventricle  Right ventricular function, chamber size, wall motion, and thickness are  normal.     Mitral Valve  Mild to moderate mitral insufficiency is present.        Tricuspid Valve  Trace tricuspid insufficiency is present. Right ventricular systolic pressure  is 53mmHg above the right atrial pressure.     Vessels  Dilation of the inferior vena cava is present with normal respiratory  variation in diameter. IVC diameter and respiratory changes fall into an  intermediate range suggesting an RA pressure of 8 mmHg.     Pericardium  No pericardial effusion is present. Prominent epicardial fat is noted.  _____________________________________________________________________________  __           Doppler Measurements & Calculations  TV max P.8  mmHg  TR max alberto: 363.3 cm/sec  TR max P.8 mmHg        _____________________________________________________________________________  __        Report approved by: Enrique Maloney 2020 11:32 AM             Medications     Medications    aspirin  81 mg Oral Daily     atorvastatin  40 mg Oral At Bedtime     [START ON 2020] azithromycin  250 mg Oral Daily     cefTRIAXone  2 g Intravenous Q24H     docusate sodium  200 mg Oral Daily     emollient   Topical Daily     fluticasone-salmeterol  1 puff Inhalation Q12H     heparin ANTICOAGULANT  5,000 Units Subcutaneous Q12H     hydrocortisone sodium succinate PF  50 mg Intravenous Q12H     levothyroxine  150 mcg Intravenous Daily     multivitamin RENAL  1 capsule Oral Daily     senna-docusate  1 tablet Oral BID    Or     senna-docusate  2 tablet Oral BID     umeclidinium  1 puff Inhalation Daily     vitamin D3  2,000 Units Oral Daily       norepinephrine Stopped (20 0745)     - MEDICATION INSTRUCTIONS -

## 2020-01-27 NOTE — PROGRESS NOTES
Critical Care Services Progress Note:  I personally examined and evaluated the patient today. I formulated today s plan with the house staff team or resident(s) and agree with the findings and plan in the associated note (see separately attested resident note).   I have evaluated all laboratory values and imaging studies from the past 24 hours.  Summary of hospital course:  74 year old female with ESRD presents with hypotension during dialysis    Overnight events/pertinent findings today:  NSVT overnight with 5 beats    Data    CXR with left CVC in RA.  CXR with left pleural effusion, PA enlargement  Quant gold +     Assessment/plan:    shock-  improved.  tolerating CRRT now. Pneumonia vs metabolic (adrenal insufficiency and/or hypothyroidism).  COPD - spiriva  Hx of Active TB - has abnormal CXR. She is coughing up sputum.  Will r/o with 3 AFB cultures.   Hypothyroid - started on synthroid and steroids. Decrease steroids to 50 q 12 hour  ESRD - requiring CRRT right now.  Will discuss with renal timing of change to IHD.     Get PIV, then remove left CVC.     Critical illness is hypotension requiring CRRT for dialysis     Rest per resident note.    I spent a total of 45 minutes (excluding procedure time) personally providing and directing critical care services at the bedside and on the critical care unit for this patient.     Lazaro Lester MD

## 2020-01-27 NOTE — PROGRESS NOTES
"                              Internal Medicine Progress Note - Gold Service  Kim Anne MRN: [id number]  Age: 74 year old, : 1945  Date: 2020         Interval History:     Being transferred out of ICU   She looks in distress using accessory muscles with hypertension. Given she did not have fluid dialysis she might be fluid overloaded. Nephrology was contacted by ICU resident and will try to do ultrafiltration tonight and HD tomorrow   Patient profoundly hypothyroid due to poor compliance  Being treated for Pneumonia    on contact isolation for possible reactivation TB     Last 24 hr care team notes reviewed.     ROS:  4 point ROS including Respiratory, CV, GI and , is negative      PHYSICAL EXAM    Vital signs:  Temp: 98.1  F (36.7  C) Temp src: Oral BP: (!) 170/86 Pulse: 82 Heart Rate: 82 Resp: 30 SpO2: (!) 88 % O2 Device: None (Room air) Oxygen Delivery: 2 LPM Height: 172.7 cm (5' 8\") Weight: 67.9 kg (149 lb 11.1 oz)  Estimated body mass index is 22.76 kg/m  as calculated from the following:    Height as of this encounter: 1.727 m (5' 8\").    Weight as of this encounter: 67.9 kg (149 lb 11.1 oz).      Intake/Output Summary (Last 24 hours) at 2020 1628  Last data filed at 2020 1200  Gross per 24 hour   Intake 830 ml   Output 1786 ml   Net -956 ml         GEN: NAD, resting comfortably on exam, pleasant and cooperative , AAox3  HEENT: NC/AT , well injected conjunctiva, anicteric sclera, EOMI, PERRL, MMM  Neck: trachea midline, no lymphadenopathy, JVD  CV: RRR, nl S1/S2 no mumurs  PULM: CTAB, symmetric chest rise  Abdomen: soft, non tender/distented , no HSM, no massess,  BS +   :nofoley catheter in place  Rectal: Good sphincter tone, no heme stool  EXTREMITIES: warm, no pedal edema, peripheral pulses intact  SKIN: No rashes, sores or ulcerations  NEURO: MS: AAOx3 -  CN II-XII grossly intact, Strength no motor-sensory deficits noted    DIAGNOSTIC STUDIES  I reviewed all " medications, laboratory results, and imaging over the past 24 hours. Notable for;   ROUTINE LABS:   Hgb 11  WBC 8.6   K 4.3  Na 137       MICROBIOLOGY  No growth       Assessment and Plan:     Date of admission: 1/25/2020  Kim Anne is a 74 year old female admitted on 1/25/2020. She was sent from HD for low BP during her session. She also missed HD on Thursday 1/23/2020. Patient is not a good historian and family member ( Son Ki Jordan) does  not know much as well.   She was found to have hyperkalemia in the ER and Her BP has been low to undergo dialysis. She was admitted to ICU, received IV synthroid, CRRT and antibiotics   Her BP has improved but this afternoon she was noted to be struggling to breath and nephrology will run Dialysis       Plan for today  - transfer out of ICU if remain stable   Dialysis     #Hypotension cause unknown< multifactorial   Ddx: unknown thought to be secondary to hypovolemia vs hypothyroidism less likely sepsis given clinical picture   -s/p 750 ml fluid without adequate response  -Bedside ECHO  EF 60-65%  -Not currently on midodrine at home prior to HD   -Giveen 25G of albumin 25% and 25G 1/27/2020   -off pressors   -hypertensive today     #Concern for potential sepsis   -A.Febrile, no leukocytosis, procal 0.17   -Unclear reason specially for hypotension as not super suspicious of sepsis   Workup: Flu -/MRSA -,  blood, sputum cx, and RVP pending  - Random Cortisol 11,    Antimicrobials/Antivirals:  Zosyn (1/25) and Vanc 1/25     #ESRD (CKD 4) on HD (T, Th, Sat)    Per pt, she missed her Thursday session on 1/23/19   -during dialysis session 1/25, became severely hypotensive run was stopped   -R tunneled HD line w/ old left side fistula   -Cont CRRT w/ plan to transitional to HD tomorrow   -She looks in distress using accessory muscles with hypertension. Given she did not have fluid dialysis she might be fluid overloaded. Nephrology was contacted by ICU resident  and will try to do ultrafiltration tonight and HD tomorrow  -Nephro consult     #COPD hx  -Cont Spiriva   -improving, most likely hypoventilation due to lethargy thought to be c/w hypothyroidism on top of hx of COPD.     -No current issues, currently on room air   -Supplemental oxygen to keep saturation above 92 %.  - Incentive spirometer every 15- 30 minutes when awake.     # History of Tuberculosis   - known TB infection in the 70s for which she was treated. Per - patient needs three negative sputum cultures to r/o active TB two negative cultures 3rd is pending  - per protocol cont negative pressure room.   -repeat sputum culture pending   -if negative, pt can be removed from isolation      #Hx of MI  #CAD   Pt had an MI on 5/18/11, current EKG benign with normal sinus rhythm and QTc 454.   -trop negative at 0.05.   -Continue PTA 81 mg ASA        #Hyperkalemia: resolved    -Unknown specific reason, attempted HD yesterday however stopped due to hypotension.   -attempted to be shifted multiple times in the ED however was refractory   -Per renal, 2K baths, and increased Dialysate rate      #Hypothyroidism  Per chart review, her thyroid function has not been normal.   Per history she is non-compliance with her medication for about 2 weeks    -TSH 97.20, free T4 0.23 on 1/25.  -No s/s of  myxedema coma at this time   -discontinue PO and Start IV levothyroxine 150 mg daily per endo recs   - Checking total T3 and free T4 tomorrow     #DM2   No PTA meds on med list. Current blood glucose stable at 115.   -on sliding scale   -on hypoglycemia protocol          Diet-Renal diet   Code: full Code  DVT PPX: Heparin         Rui Guerrero MD  Internal Medicine Hospitalist   [facility]  Pager: [phone]    Team: Lorena Hooper   Page Cross Cover between 5pm-8am: pager [phone] (Rufus), if no response then page job code [phone].

## 2020-01-27 NOTE — PLAN OF CARE
Chest tightness this am/afternoon (12 lead EKG and troponins). Shortness of breath throughout the shift with tachypnea, and accessory muscle use.  See previous notes.  2-3 L of oxygen NC.  Up with standby to commode and chair with walker.  One time dose of labetolol given for hypertension    P: Trend troponin, bilateral upper and lower extremity ultrasounds.

## 2020-01-27 NOTE — PROGRESS NOTES
Brief Endocrine Progress Note    73yo F with h/o CAD, COPD, ESRD on HD, T2DM admitted with post-HD hypotension and found to have significantly abnormal thyroid labs in the setting of chronic hypothyroidism - poorly controlled due to medication non-adherence.    Thyroid function studies improving on IV levothyroxine. Notably also on IV hydrocortisone for relative adrenal insufficiency. Patient appears to be doing well with hypertensive vital signs, normal HR, still on CRRT and antibiotics, undergoing active TB rule-out.    - continue same dose of IV levothyroxine  - will consider switching to PO and monitoring for potential absorption issues after tomorrow IV dose  - continue to check daily free T4, total T3  - Endocrine will continue to follow    Staffed with Dr. Lepe.    Miriam Villa MD  Internal Medicine/Pediatrics, PGY3  [facility]  (p) [phone]     I have discussed the care with the resident and agree with the plan of care.  MADONNA Jonas

## 2020-01-27 NOTE — PLAN OF CARE
OT 4A. Cancel. Per RN, pt not appropriate for therapy today due to increased ectopic rhythm. Will reschedule OT evaluation.

## 2020-01-27 NOTE — PROGRESS NOTES
[facility] Care Coordination Contact    Spoke with Pratibha ENG at Dialysis, cancelled care conference scheduled for tomorrow as member is currently inpatient.   Pratibha expressed concerns of member's cognition, ability to make decisions regarding care, food, medications.   CC will continue to make outreach to member and offer services.  Most recent MoCa 13/30, moderate cognitive impairment.   Member would benefit from [facility] med set up, assistance with medical appt's, transportation.    CC will contact Pratibha when member discharges.    This CC has no SABI to communicate with family members.  Sakina Carrion RN, BC  Manager [facility] Care Coordinator   [phone]  (Fax)

## 2020-01-27 NOTE — PROGRESS NOTES
1/27/2020 Rec'd tele call from Antonella ENG to report that she rec'd CC message and has reviewed member's chart.    CC to follow.  Sakina Carrion RN, BC  Manager [facility] Care Coordinator   [phone]  (Fax)        [facility] Care Coordination Contact    TRANSITIONS OF CARE (KERRIE) LOG   KERRIE tasks should be completed by the CC within one (1) business day of notification of each transition. Follow up contact with member is required after return to their usual care setting.  Note:  If CC finds out about the transitions fifteen (15) days or more after the member has returned to their usual care setting, no KERRIE log is needed. However, the CC should check in with the member to discuss the transition process, any changes needed to the care plan and document it in a case note.    [place]  [place] [facility] [place]  [facility]Health Plan Member ID#: [id number]   Product: [facility] Care Coordinator Contact:  Sakina Carrion RN (Batool Mai RN PHN_ Agency/[facility]/Care System: [facility]   Transition Communication Actions from Care Management Contact   Transition #1   Notification Date: 1/27/2020 Transition Date:   1/25/2020 Transition From: Home   (dialysis_     Is this the member s usual care setting?               yes Transition To: Hospital, [facility]-   Transition Type:  Unplanned  Reason for Admission/Comments:  Hypotensive at dialysis. Admitting Dx: Hyperkalemia without EKG changes,  fluid overload due to missed dialysis  CC contacted Hospital /discharge planner, Monae Mooney, [phone] and left a message with this CC contact information, reviewed community POC as well requested to be notified of concerns, care conferences and discharge planning.  Shared that member has declined recommendations for homecare services and that CC has been in communication with Dialysis unit and a care conference was rescheduled for 1/28/2020.  Reviewed and  update care plan as needed.  Notified community service providers and placed services Dialysis unit.  Transition log initiated.   PCP notified of hospitalization via EMR.  Sakina Carrion RN, BC  Manager [facility] Care Coordinator   [phone]  (Fax)     Transition #2   Transition #3  (if applicable)   Notification Date: 2/3/2020         Transition To:  Home  Transition Date: 2/1/2020     Transition Type:    Planned  Notified PCP -- Date completed: 2/1/2020              CC received notification of discharge to home. Discharge occurred on 2/1/20 from [facility]  CC contacted Pratibha ENG at Dialysis unit, scheduled care conference to meet with member 2/4/2020 @ 8:45.  Call placed to [facility] ([place]) to provide informaiton on how to reach member by providing dialysis information. Explained that CC has more success reaching member at dialysis than at her home.     Call placed to member's home, no answer, no voice message.  CC will meet with member 2/4/2020.   Met with member at Dialysis on 2/4/2020:  Member has a follow-up appointment with PCP in 7 days:  No, member agreed that CC assist with scheduling appt, appt scheduled 2/7/2020  Member has had a change in condition: No  Home visit needed: No  Care plan reviewed and updated.  The following home based services were resumed: NA.  New referrals placed: Yes: RN   [facility] initial visit scheduled for 2/7/2020  Transition log completed.   PCP notified of transition back to home via EMR.       Notification Date:          Transition To:    Transition Date:              Transition Type:      Notified PCP--Date completed:          Shared CC contact info, care plan/services with receiving setting or, if applicable, home care agency--Date completed:       *Complete additional tasks below, if this transition is a return to usual care setting.      Comments:       *Complete tasks below when the member is discharging TO their usual care setting within one (1) business day of  notification.  For situations where the Care Coordinator is notified of the discharge prior to the date of discharge, the Care Coordinator must follow up with the member or designated representative to confirm that discharge actually occurred and discuss required KERRIE tasks as outlined in the KERRIE Instructions.  (This includes situations where it may be a  new  usual care setting for the member. (i.e., a community member who decides upon permanent nursing home placement following hospitalization and rehab).    Date completed: 2/5/2020  Communicated with member or their designated representative about the following:  care transition process; about changes to the member s health status; plan of care updates; education about transitions and how to prevent unplanned transitions/readmissions  Four Pillars for Optimal Transition:    Check  Yes  - if the member, family member and/or SNF/facility staff manages the following:    If  No  provide explanation in the comments section.          [x]  Yes     []  No     Does the member have a follow-up appointment scheduled with primary care or specialist? (Mental health hospitalizations--the appt. should be w/in 7 days)   []  Yes     [x]  No     Can the member manage their medications or is there a system in place to manage medications (e.g. home care set-up)?         [x]  Yes     []  No     Can the member verbalize warning signs and symptoms to watch for and how to respond?         [x]  Yes     []  No     Does the member use a Personal Health Care Record?  Check  Yes  if visit summary, discharge summary, and/or healthcare summary are being used as a PHR.                                                                                                                                                                                    [x] Yes      [] No      Have you updated the member s care plan?  If  No  provide explanation in comments.   Comments:  Member reports that she needs  assistance with medication management, referral to home care with initial visit 2/7/2020. Member's emergency contact will be present at home care assessment.

## 2020-01-27 NOTE — PROGRESS NOTES
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     Assessment & Recommendations:     # ESRD on TTS iHD  Patient gets HD with Dr Liz at [facility], running for 3.0 hours still via her CVC with AVF created back in 11/2019. EDW 57.5 kg.    - Patient initiated on CRRT 1/25 for with shock.    - Will discontinue CRRT today and resume HD tomorrow   - Avoid venipuncture/blood pressures right arm   - AVF well developed but only 8 wks old. OP HD nurse reports that patient has missed her surgical f/u appts. They are wondering when we can use the access    # HTN / volume status - Hypervolemia with uncontrolled blood pressures this morning. Weight is a bed weight but EDW is 57.5 kg. Weight today is 67.9 kg. Admission weight 63.5 kg.  Blood pressures today 140-180/. Not on antihypertensives at baseline   - Please stand for weights   - Will run daily to achieve EDW     # electrolytes - No acute concerns. K 4.3, Na 137    # Acid base - No acute concerns. 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 Will go ahead and order a UF only run for 3 kg. Will continue plan for HD run tomorrow    Recommendations were communicated to primary team via progress note    Seen and discussed with Dr. Nadine Ellsworth, NP   [phone]    Interval History :   Nursing and provider notes from last 24 hours reviewed.  Blood pressures elevated  Not removing fluid on CRRT  K is normalized    Review of Systems:   I reviewed the following systems:  GI: Denies N/V/D  Neuro:  Alert,  "interactive  Constitutional:  No fever or chills  CV: Denies dyspnea, CP or edema.     Physical Exam:   I/O last 3 completed shifts:  In: 1903.1 [P.O.:780; I.V.:404.1; Other:19]  Out: 1875 [Urine:1775; Other:100]   BP (!) 145/97   Pulse 80   Temp 96  F (35.6  C) (Axillary)   Resp 26   Ht 1.727 m (5' 8\")   Wt 67.9 kg (149 lb 11.1 oz)   SpO2 93%   BMI 22.76 kg/m       GENERAL APPEARANCE: Alert/responisve  PULM: lungs CTA. Breathing is non labored.   CV: RRR       -edema - None   GI: soft, NT, Non distended.   INTEGUMENT: no cyanosis or rash  NEURO:  alert  Access - Right TDC, Right AVF + bruit    Labs:   All labs reviewed by me  Electrolytes/Renal -   Recent Labs   Lab Test 01/27/20  1000 01/27/20  0420 01/26/20  2209 01/26/20  1612  01/26/20  1030    137 138 135  --  134   POTASSIUM 4.1 4.3 4.9 5.3   < > 6.1*   CHLORIDE 106 106 106 106  --  107   CO2 28 27 27 25  --  26   BUN 12 15 20 24  --  34*   CR 1.36* 1.69* 2.09* 2.73*  --  3.88*   * 134* 138* 197*  --  126*   FRANCES 9.8 9.3 9.1 8.9  --  8.7   MAG  --  1.9  --  2.0  --  2.1   PHOS  --  2.8  --  3.2  --  4.6*    < > = values in this interval not displayed.       CBC -   Recent Labs   Lab Test 01/27/20  0420 01/26/20  0550 01/25/20  1308 01/25/20  0815   WBC 8.6 5.5  --  9.4   HGB 11.0* 10.3* 11.9 11.9    190  --  279       LFTs -   Recent Labs   Lab Test 01/27/20  0420 01/26/20  0719 01/26/20  0550   ALKPHOS 128 125 Canceled, Test credited   BILITOTAL 0.3 0.4 Canceled, Test credited   ALT 51* 21 Canceled, Test credited   AST 47* 32 Canceled, Test credited   PROTTOTAL 6.9 5.9* Canceled, Test credited   ALBUMIN 3.5 2.5* Canceled, Test credited       Iron Panel -   Recent Labs   Lab Test 05/01/19  1320 02/16/18  0945 09/11/17  0928   IRON 48 46 73   IRONSAT 34 28 43   * 134 127         Imaging:    XR CHEST PORT 1 VW  1/27/2020 3:05 AM       HISTORY: CVC placement- having more ectopy     COMPARISON: 1/26/2020     FINDINGS: Right IJ " central venous line tip is at the atriocaval  junction. Left IJ central venous line tip is at the mid right atrium.  Stent over the left upper mediastinum. Small left pleural effusion,  unchanged. Unchanged diffuse interstitial opacities.                                                                      IMPRESSION: Right IJ central venous line tip is at the atriocaval  junction. Left IJ central venous line tip is at the mid right atrium.     I have personally reviewed the examination and initial interpretation  and I agree with the findings.     RADHA LOYD, DO    Current Medications:    aspirin  81 mg Oral Daily     atorvastatin  40 mg Oral At Bedtime     [START ON 1/28/2020] azithromycin  250 mg Oral Daily     cefTRIAXone  2 g Intravenous Q24H     docusate sodium  200 mg Oral Daily     emollient   Topical Daily     fluticasone-salmeterol  1 puff Inhalation Q12H     heparin ANTICOAGULANT  5,000 Units Subcutaneous Q12H     hydrocortisone sodium succinate PF  50 mg Intravenous Q12H     levothyroxine  150 mcg Intravenous Daily     multivitamin RENAL  1 capsule Oral Daily     senna-docusate  1 tablet Oral BID    Or     senna-docusate  2 tablet Oral BID     umeclidinium  1 puff Inhalation Daily     vitamin D3  2,000 Units Oral Daily       norepinephrine Stopped (01/26/20 0745)     - MEDICATION INSTRUCTIONS -       Mita Ellsworth, NP

## 2020-01-27 NOTE — PLAN OF CARE
PT 4A. Cancel. Per RN, pt not appropriate for therapy today due to increased ectopic rhythm. Will reschedule PT evaluation.

## 2020-01-27 NOTE — PROGRESS NOTES
Social Work Services Progress Note    Hospital Day: 3  Date of Initial Social Work Evaluation:  Not yet completed  Collaborated with:  [facility] care coordinator (Sakina Carrion, [phone]), bedside RN, chart review    Data:  Pt is a 74 F with h/o CAD, COPD, DM2, hypothyroidism and ESRD presents to MICU after hypotension on HD run.    Intervention:  SW received voicemail this morning from [facility] care coordinator Sakina notifying SW that Sakina is involved and has been attempting to engage pt in home care services and additional assistance that pt has been declining. A [facility] report has been filed in the past (see home visit notes from 12/04/2019 by Sakina and by SARABJIT Horowitz).    SW checked in with pt's bedside RN. RN noted that pt had requested to be confidential over concerns regarding her daughter. Pt had stated that she lives with granddaughter (prior notes suggest pt may not be 100% reliable and frequently reports confusion around her medications). Pt is currently in Airborne precautions for rule-out of TB, which hopefully will not be needed for much longer. SW will follow up with pt directly tomorrow to perform social work assessment and screen for safety at home.     Assessment:  Did not meet with pt for this intervention    Plan:    Anticipated Disposition:  TBD. Per prior chart notes, home does not appear to be a safe discharge option for pt. However, pt has a history of refusing placement.    Barriers to d/c plan:  Pt remains critically ill in ICU    Follow Up:  SW will continue to remain available for patient and family support, discharge planning, other resources and support PRN.    Antonella Mooney, ANA, [facility]  ICU    [facility]   P: [phone]  Pager: [phone]

## 2020-01-28 ENCOUNTER — APPOINTMENT (OUTPATIENT)
Dept: OCCUPATIONAL THERAPY | Facility: CLINIC | Age: 75
DRG: 871 | End: 2020-01-28
Payer: COMMERCIAL

## 2020-01-28 ENCOUNTER — APPOINTMENT (OUTPATIENT)
Dept: ULTRASOUND IMAGING | Facility: CLINIC | Age: 75
DRG: 871 | End: 2020-01-28
Payer: COMMERCIAL

## 2020-01-28 LAB
ANION GAP SERPL CALCULATED.3IONS-SCNC: 4 MMOL/L (ref 3–14)
BACTERIA SPEC CULT: NORMAL
BASE EXCESS BLDV CALC-SCNC: 1.9 MMOL/L
BUN SERPL-MCNC: 25 MG/DL (ref 7–30)
CALCIUM SERPL-MCNC: 9.1 MG/DL (ref 8.5–10.1)
CHLORIDE SERPL-SCNC: 106 MMOL/L (ref 94–109)
CO2 SERPL-SCNC: 26 MMOL/L (ref 20–32)
CREAT SERPL-MCNC: 2.49 MG/DL (ref 0.52–1.04)
GFR SERPL CREATININE-BSD FRML MDRD: 18 ML/MIN/{1.73_M2}
GLUCOSE BLDC GLUCOMTR-MCNC: 122 MG/DL (ref 70–99)
GLUCOSE SERPL-MCNC: 107 MG/DL (ref 70–99)
HCO3 BLDV-SCNC: 27 MMOL/L (ref 21–28)
INTERPRETATION ECG - MUSE: NORMAL
LACTATE BLD-SCNC: 1.1 MMOL/L (ref 0.7–2)
O2/TOTAL GAS SETTING VFR VENT: 2 %
OXYHGB MFR BLDV: 92 %
PCO2 BLDV: 44 MM HG (ref 40–50)
PH BLDV: 7.4 PH (ref 7.32–7.43)
PLATELET # BLD AUTO: 185 10E9/L (ref 150–450)
PO2 BLDV: 68 MM HG (ref 25–47)
POTASSIUM SERPL-SCNC: 4.4 MMOL/L (ref 3.4–5.3)
SODIUM SERPL-SCNC: 136 MMOL/L (ref 133–144)
SPECIMEN SOURCE: NORMAL
T3FREE SERPL-MCNC: <0.5 PG/ML (ref 2.3–4.2)
T4 FREE SERPL-MCNC: 0.48 NG/DL (ref 0.76–1.46)

## 2020-01-28 PROCEDURE — 84439 ASSAY OF FREE THYROXINE: CPT

## 2020-01-28 PROCEDURE — 12000004 ZZH R&B IMCU UMMC

## 2020-01-28 PROCEDURE — 90937 HEMODIALYSIS REPEATED EVAL: CPT

## 2020-01-28 PROCEDURE — 87116 MYCOBACTERIA CULTURE: CPT | Performed by: STUDENT IN AN ORGANIZED HEALTH CARE EDUCATION/TRAINING PROGRAM

## 2020-01-28 PROCEDURE — 25000125 ZZHC RX 250: Performed by: STUDENT IN AN ORGANIZED HEALTH CARE EDUCATION/TRAINING PROGRAM

## 2020-01-28 PROCEDURE — 82805 BLOOD GASES W/O2 SATURATION: CPT

## 2020-01-28 PROCEDURE — 25000132 ZZH RX MED GY IP 250 OP 250 PS 637: Performed by: STUDENT IN AN ORGANIZED HEALTH CARE EDUCATION/TRAINING PROGRAM

## 2020-01-28 PROCEDURE — 36415 COLL VENOUS BLD VENIPUNCTURE: CPT

## 2020-01-28 PROCEDURE — 87015 SPECIMEN INFECT AGNT CONCNTJ: CPT | Performed by: STUDENT IN AN ORGANIZED HEALTH CARE EDUCATION/TRAINING PROGRAM

## 2020-01-28 PROCEDURE — 25000132 ZZH RX MED GY IP 250 OP 250 PS 637: Performed by: NURSE PRACTITIONER

## 2020-01-28 PROCEDURE — 84481 FREE ASSAY (FT-3): CPT

## 2020-01-28 PROCEDURE — 25800030 ZZH RX IP 258 OP 636

## 2020-01-28 PROCEDURE — 25000128 H RX IP 250 OP 636: Performed by: STUDENT IN AN ORGANIZED HEALTH CARE EDUCATION/TRAINING PROGRAM

## 2020-01-28 PROCEDURE — 97165 OT EVAL LOW COMPLEX 30 MIN: CPT | Mod: GO | Performed by: OCCUPATIONAL THERAPIST

## 2020-01-28 PROCEDURE — 80048 BASIC METABOLIC PNL TOTAL CA: CPT

## 2020-01-28 PROCEDURE — 85049 AUTOMATED PLATELET COUNT: CPT

## 2020-01-28 PROCEDURE — 93970 EXTREMITY STUDY: CPT | Mod: XS

## 2020-01-28 PROCEDURE — 97535 SELF CARE MNGMENT TRAINING: CPT | Mod: GO | Performed by: OCCUPATIONAL THERAPIST

## 2020-01-28 PROCEDURE — 93970 EXTREMITY STUDY: CPT

## 2020-01-28 PROCEDURE — 83605 ASSAY OF LACTIC ACID: CPT

## 2020-01-28 PROCEDURE — 87206 SMEAR FLUORESCENT/ACID STAI: CPT | Performed by: STUDENT IN AN ORGANIZED HEALTH CARE EDUCATION/TRAINING PROGRAM

## 2020-01-28 PROCEDURE — 99207 ZZC CDG-CUT & PASTE-POTENTIAL IMPACT ON LEVEL: CPT | Performed by: INTERNAL MEDICINE

## 2020-01-28 PROCEDURE — 40000556 ZZH STATISTIC PERIPHERAL IV START W US GUIDANCE

## 2020-01-28 PROCEDURE — 25000128 H RX IP 250 OP 636

## 2020-01-28 PROCEDURE — 00000146 ZZHCL STATISTIC GLUCOSE BY METER IP

## 2020-01-28 PROCEDURE — 25000128 H RX IP 250 OP 636: Performed by: NURSE PRACTITIONER

## 2020-01-28 PROCEDURE — 99233 SBSQ HOSP IP/OBS HIGH 50: CPT | Performed by: INTERNAL MEDICINE

## 2020-01-28 RX ORDER — OXYCODONE HYDROCHLORIDE 5 MG/1
5-10 TABLET ORAL EVERY 4 HOURS PRN
Status: DISCONTINUED | OUTPATIENT
Start: 2020-01-28 | End: 2020-02-01 | Stop reason: HOSPADM

## 2020-01-28 RX ORDER — LIDOCAINE/PRILOCAINE 2.5 %-2.5%
CREAM (GRAM) TOPICAL
Status: DISCONTINUED | OUTPATIENT
Start: 2020-01-28 | End: 2020-01-28

## 2020-01-28 RX ORDER — LEVOTHYROXINE SODIUM ANHYDROUS 100 UG/5ML
150 INJECTION, POWDER, LYOPHILIZED, FOR SOLUTION INTRAVENOUS ONCE
Status: COMPLETED | OUTPATIENT
Start: 2020-01-28 | End: 2020-01-28

## 2020-01-28 RX ORDER — LIDOCAINE 40 MG/G
CREAM TOPICAL
Status: COMPLETED | OUTPATIENT
Start: 2020-01-28 | End: 2020-01-29

## 2020-01-28 RX ORDER — LEVOTHYROXINE SODIUM ANHYDROUS 100 UG/5ML
150 INJECTION, POWDER, LYOPHILIZED, FOR SOLUTION INTRAVENOUS DAILY
Status: DISCONTINUED | OUTPATIENT
Start: 2020-01-29 | End: 2020-01-28

## 2020-01-28 RX ORDER — LEVOTHYROXINE SODIUM ANHYDROUS 100 UG/5ML
150 INJECTION, POWDER, LYOPHILIZED, FOR SOLUTION INTRAVENOUS DAILY
Status: DISCONTINUED | OUTPATIENT
Start: 2020-01-28 | End: 2020-01-28

## 2020-01-28 RX ADMIN — ATORVASTATIN CALCIUM 40 MG: 40 TABLET, FILM COATED ORAL at 21:43

## 2020-01-28 RX ADMIN — CEFTRIAXONE 2 G: 2 INJECTION, POWDER, FOR SOLUTION INTRAMUSCULAR; INTRAVENOUS at 10:30

## 2020-01-28 RX ADMIN — Medication 5000 UNITS: at 21:43

## 2020-01-28 RX ADMIN — OXYCODONE HYDROCHLORIDE 5 MG: 5 TABLET ORAL at 16:31

## 2020-01-28 RX ADMIN — MELATONIN 2000 UNITS: at 09:10

## 2020-01-28 RX ADMIN — AZITHROMYCIN MONOHYDRATE 250 MG: 250 TABLET ORAL at 09:10

## 2020-01-28 RX ADMIN — Medication 5000 UNITS: at 09:21

## 2020-01-28 RX ADMIN — ACETAMINOPHEN 650 MG: 325 TABLET, FILM COATED ORAL at 17:33

## 2020-01-28 RX ADMIN — Medication: at 09:09

## 2020-01-28 RX ADMIN — LEVOTHYROXINE SODIUM ANHYDROUS 150 MCG: 100 INJECTION, POWDER, LYOPHILIZED, FOR SOLUTION INTRAVENOUS at 18:56

## 2020-01-28 RX ADMIN — UMECLIDINIUM 1 PUFF: 62.5 AEROSOL, POWDER ORAL at 09:10

## 2020-01-28 RX ADMIN — SODIUM CHLORIDE 300 ML: 9 INJECTION, SOLUTION INTRAVENOUS at 14:54

## 2020-01-28 RX ADMIN — SENNOSIDES AND DOCUSATE SODIUM 1 TABLET: 8.6; 5 TABLET ORAL at 09:10

## 2020-01-28 RX ADMIN — OXYCODONE HYDROCHLORIDE 5 MG: 5 TABLET ORAL at 23:20

## 2020-01-28 RX ADMIN — SODIUM CHLORIDE 250 ML: 9 INJECTION, SOLUTION INTRAVENOUS at 14:55

## 2020-01-28 RX ADMIN — HYDROCORTISONE SODIUM SUCCINATE 50 MG: 100 INJECTION, POWDER, FOR SOLUTION INTRAMUSCULAR; INTRAVENOUS at 20:28

## 2020-01-28 RX ADMIN — ACETAMINOPHEN 650 MG: 325 TABLET, FILM COATED ORAL at 00:16

## 2020-01-28 RX ADMIN — SENNOSIDES AND DOCUSATE SODIUM 1 TABLET: 8.6; 5 TABLET ORAL at 20:28

## 2020-01-28 RX ADMIN — ACETAMINOPHEN 650 MG: 325 TABLET, FILM COATED ORAL at 21:43

## 2020-01-28 RX ADMIN — DOCUSATE SODIUM 200 MG: 100 CAPSULE, LIQUID FILLED ORAL at 09:10

## 2020-01-28 RX ADMIN — HYDROCORTISONE SODIUM SUCCINATE 50 MG: 100 INJECTION, POWDER, FOR SOLUTION INTRAMUSCULAR; INTRAVENOUS at 09:11

## 2020-01-28 RX ADMIN — ACETAMINOPHEN 650 MG: 325 TABLET, FILM COATED ORAL at 05:03

## 2020-01-28 RX ADMIN — FLUTICASONE PROPIONATE AND SALMETEROL 1 PUFF: 50; 250 POWDER RESPIRATORY (INHALATION) at 09:21

## 2020-01-28 RX ADMIN — IRON SUCROSE 100 MG: 20 INJECTION, SOLUTION INTRAVENOUS at 17:35

## 2020-01-28 RX ADMIN — Medication: at 09:18

## 2020-01-28 RX ADMIN — ACETAMINOPHEN 650 MG: 325 TABLET, FILM COATED ORAL at 10:34

## 2020-01-28 RX ADMIN — OXYCODONE HYDROCHLORIDE 5 MG: 5 TABLET ORAL at 06:06

## 2020-01-28 RX ADMIN — Medication 1 CAPSULE: at 12:31

## 2020-01-28 RX ADMIN — ASPIRIN 81 MG CHEWABLE TABLET 81 MG: 81 TABLET CHEWABLE at 09:10

## 2020-01-28 RX ADMIN — Medication 1 MG: at 21:43

## 2020-01-28 ASSESSMENT — MIFFLIN-ST. JEOR
SCORE: 1217.5
SCORE: 1242.5

## 2020-01-28 ASSESSMENT — ACTIVITIES OF DAILY LIVING (ADL)
ADLS_ACUITY_SCORE: 20
ADLS_ACUITY_SCORE: 19
ADLS_ACUITY_SCORE: 19
ADLS_ACUITY_SCORE: 20
ADLS_ACUITY_SCORE: 20
ADLS_ACUITY_SCORE: 17

## 2020-01-28 ASSESSMENT — PAIN DESCRIPTION - DESCRIPTORS
DESCRIPTORS: ACHING;DISCOMFORT
DESCRIPTORS: ACHING
DESCRIPTORS: ACHING;DISCOMFORT

## 2020-01-28 NOTE — PLAN OF CARE
Neuro: Alert, oriented to situation, year, month. Word finding difficulty.  Cardiac: SR. VSS.   Respiratory: Sating 95% on 2L. Lungs sound diminished, clear. RR unlabored.  GI/: No output since transferring to unit. HD patient.  Diet/appetite: Tolerating reg diet. Poor appetite.  Activity:  Assist of 1, up to BR.  Pain: Reports pain/discomfort in legs. Gave tylenol, oxycodone.  Skin: No new deficits noted. Scattered scabs, scattered bruising, blanchable erythema to sacrum.  LDA's: CVC to R subclavian, for HD. PIV LUE. Old dialysis fistula R arm.    Plan: Continue with POC. Notify primary team with changes.

## 2020-01-28 NOTE — PLAN OF CARE
"  Discharge Planner OT   Patient plan for discharge: home  Current status: Unable to take pt outside of room for therapy to assess balance fully, but appearing steady in room, although impulsive. CGA and 4WW for mobility, SBA for toilet transfer, min A for G/H at sink, although needing several VCs during G/H due to cognitive impairments, which may be baseline. SpO2 89% on room air, placed on 2L for session, and maintaining low 90s, but she does not wear O2 at home. Reporting fatigue, SOB, and feeling a little \"weaker\" today. Is not very active and reports being limited by SOB and \"feeling faint\" with prolonged activity at baseline.   Barriers to return to prior living situation: Decreased ADL independence and activity tolerance  Recommendations for discharge: likely home with 24/7 assist  Rationale for recommendations: Pt has good support at home and 24/7 assist available from her report. Is likely near baseline, although may have to complete stairs in home throughout the day. Will continue to assess.        Entered by: Rod Díaz 01/28/2020 1:52 PM       "

## 2020-01-28 NOTE — PROGRESS NOTES
HEMODIALYSIS TREATMENT NOTE    Date: 1/27/2020  Time: 9:58 PM    Data:  Pre Wt:  67.9kg   Desired Wt: 64.9 kg   Post Wt:  64kg  Ultrafiltration - Post Run Net Total Removed (mL): 3000 mL  Vascular Access Status: patent  Dialyzer Rinse: Clear  Total Blood Volume Processed: 0 L Liters  Total Dialysis (Treatment) Time: 2.5 Hours    Lab:   Yes     Assessment/Interventions:  Patient ran 2.5hrs of UF run. 3kg net fluid removal. Patient tolerated HD run well. Due to SOB, Pt was sitting in the bed at most of the HD run and lye back back at end of treatment run. CVC site dressing changed. Looks good good. Post dialysis hand off report given to primary floor RN.      Plan:    Next HD run per renal team.

## 2020-01-28 NOTE — PROGRESS NOTES
Transfer  Transferred from: 4A  Via:bed  Reason for transfer:Pt appropriate for 6B- improved patient condition  Family: Aware of transfer  Belongings: Received with pt  Chart: Received with pt  Medications: Meds received from old unit with pt  2 RN Skin Assessment Completed By: Michael GALVAN  Report received from: Raina RIVERA  Pt status: Alert, conversational, slightly disoriented. VSS. 2-3 L oxygen. Reports pain in legs.  Skin: scattered small scabs, scattered bruising, blanchable erythema to sacrum.

## 2020-01-28 NOTE — PLAN OF CARE
Major Shift Events:  No acute events this shift.   Neuro: Alert and oriented X 4 . Forgetful, and impulsive. Bed alarm and chair alarm on at all times.   Pulmonary: LS coarse on 2 L NC.   CV: SR. Afebrile   GI/: Tolerating regular diet. Oliguric.   Skin; scattered bruising and scabbing.   IV access: PIV SL, R subclavian tunneled cath, TOÑO AV fistula with palpable thrill.     Plan: Currently in HD. Continue POC.   For vital signs and complete assessments, please see documentation flowsheets.     Shaylee Garcia RN

## 2020-01-28 NOTE — PLAN OF CARE
6B PT - PT evaluation orders acknowledged and appreciated. Per interdisciplinary discussion, PT to hold as therapy needs are currently being met by OT. Pt is on airborne precautions and unable to leave the room. OT to trial step ups in room (as an attempt to trial stairs) and continue to follow for ADL and functional mobility needs. Pt potentially needs PT while IP. PT to check in with OT tomorrow.

## 2020-01-28 NOTE — PROVIDER NOTIFICATION
Notified gold 9 provider that patient is still complaining of SOB. Currently being dialyzed. Remains on 3L nasal cannula with sats ~94%. Sitting up in bed/hunched over helps her breathing. Will continue to monitor.

## 2020-01-28 NOTE — PROGRESS NOTES
Brief Endocrine Progress Note    75yo F with h/o CAD, COPD, ESRD on HD, T2DM admitted with post-HD hypotension and found to have significantly abnormal thyroid labs in the setting of chronic hypothyroidism - poorly controlled due to medication non-adherence, patient reports due to insurance issues and lack of transportation to get her medications; therefore she doesn't take her medications, she says.    Free T4 continues to improve, and patient reports that she is feeling better. SOB apparently improved after UF last night.    - continue same dose of IV levothyroxine for today (ordered for you)  - will start PO 175mcg daily in AM (ordered for you), slightly reduced dose in conversion due to suspected supratherapeutic dose outpatient as patient wasn't taking  - continue to check daily free T4, total T3, TSH in AM (ordered for you)  - Endocrine will continue to follow    Staffed with Dr. Lepe.    Miriam Villa MD  Internal Medicine/Pediatrics, PGY3  [facility]  (p) [phone]     I have reviewed the resident's note, and agree with the plan of care.  MADONNA Jonas

## 2020-01-28 NOTE — PROGRESS NOTES
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     Assessment & Recommendations:     # ESRD on TTS iHD  Patient gets HD with Dr Liz at [facility], running for 3.0 hours still via her CVC with AVF created back in 11/2019. EDW 57.5 kg.    - Patient received CRRT 1/25 -1/27   - Had UF run on 1/27 for 3 kg fluid removal   - Will resume HD today and alternate with UF runs to improve her volume status   - Avoid venipuncture/blood pressures right arm   - AVF well developed. Surgery date was 11/22/13. Discussed with Dr Vega today and she is fine with us cannulating the AVF. Will begin with #17 g needles     # HTN / volume status - Hypervolemia with uncontrolled blood pressures/dyspnea/hypoxia. Bed Weight today is 69.4 kg. EDW is 57.5 kg. Admission weight 63.5 kg.  Blood pressures today 140-150/. Not on antihypertensives at baseline   - Please stand for weights   - UF goal today is 3 kg   - Will run daily to achieve EDW     # electrolytes - No acute concerns. K 4.4, Na 136    # Acid base - No acute concerns. 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 Nadine Ellsworth, NP   [phone]    Interval History :   Nursing and provider notes from last 24 hours reviewed.  Blood pressures improved with fluid removal yesterday  Non oliguric  Scheduled for routine HD today    Review of Systems:   I reviewed the following systems:  GI: Denies N/V/D  Neuro:  Alert, interactive  Constitutional:  No fever or chills  CV: Reports  "feeling short of breath, denies CP or edema.     Physical Exam:   I/O last 3 completed shifts:  In: 270 [P.O.:170; I.V.:100]  Out: 3050 [Urine:50; Other:3000]   /69   Pulse 71   Temp 98.2  F (36.8  C) (Oral)   Resp 16   Ht 1.727 m (5' 8\")   Wt 69.4 kg (153 lb)   SpO2 92%   BMI 23.26 kg/m       GENERAL APPEARANCE: Alert/responisve  PULM: lungs CTA. Breathing is non labored. On supplemental oxygen  CV: RRR       -edema - None   GI: soft, NT, Non distended.   INTEGUMENT: no cyanosis or rash  NEURO:  alert  Access - Right TDC, Right AVF + bruit    Labs:   All labs reviewed by me  Electrolytes/Renal -   Recent Labs   Lab Test 01/28/20  0422 01/27/20  1000 01/27/20  0420  01/26/20  1612  01/26/20  1030    136 137   < > 135  --  134   POTASSIUM 4.4 4.1 4.3   < > 5.3   < > 6.1*   CHLORIDE 106 106 106   < > 106  --  107   CO2 26 28 27   < > 25  --  26   BUN 25 12 15   < > 24  --  34*   CR 2.49* 1.36* 1.69*   < > 2.73*  --  3.88*   * 133* 134*   < > 197*  --  126*   FRANCES 9.1 9.8 9.3   < > 8.9  --  8.7   MAG  --   --  1.9  --  2.0  --  2.1   PHOS  --   --  2.8  --  3.2  --  4.6*    < > = values in this interval not displayed.       CBC -   Recent Labs   Lab Test 01/28/20  0422 01/27/20  1750 01/27/20  0420 01/26/20  0550   WBC  --  14.5* 8.6 5.5   HGB  --  11.4* 11.0* 10.3*    207 200 190       LFTs -   Recent Labs   Lab Test 01/27/20  0420 01/26/20  0719 01/26/20  0550   ALKPHOS 128 125 Canceled, Test credited   BILITOTAL 0.3 0.4 Canceled, Test credited   ALT 51* 21 Canceled, Test credited   AST 47* 32 Canceled, Test credited   PROTTOTAL 6.9 5.9* Canceled, Test credited   ALBUMIN 3.5 2.5* Canceled, Test credited       Iron Panel -   Recent Labs   Lab Test 05/01/19  1320 02/16/18  0945 09/11/17  0928   IRON 48 46 73   IRONSAT 34 28 43   * 134 127         Imaging:    XR CHEST PORT 1 VW  1/27/2020 3:05 AM       HISTORY: CVC placement- having more ectopy     COMPARISON: " 1/26/2020     FINDINGS: Right IJ central venous line tip is at the atriocaval  junction. Left IJ central venous line tip is at the mid right atrium.  Stent over the left upper mediastinum. Small left pleural effusion,  unchanged. Unchanged diffuse interstitial opacities.                                                                      IMPRESSION: Right IJ central venous line tip is at the atriocaval  junction. Left IJ central venous line tip is at the mid right atrium.     I have personally reviewed the examination and initial interpretation  and I agree with the findings.     RADHA LOYD, DO    Current Medications:    aspirin  81 mg Oral Daily     atorvastatin  40 mg Oral At Bedtime     azithromycin  250 mg Oral Daily     cefTRIAXone  2 g Intravenous Q24H     docusate sodium  200 mg Oral Daily     emollient   Topical Daily     fluticasone-salmeterol  1 puff Inhalation Q12H     gelatin absorbable  1 each Topical During Hemodialysis (from stock)     heparin ANTICOAGULANT  5,000 Units Subcutaneous Q12H     hydrocortisone sodium succinate PF  50 mg Intravenous Q12H     influenza Vac Split High-Dose  0.5 mL Intramuscular Prior to discharge     iron sucrose  100 mg Intravenous Once in dialysis     [START ON 1/29/2020] levothyroxine  150 mcg Intravenous Daily     multivitamin RENAL  1 capsule Oral Daily     - MEDICATION INSTRUCTIONS -   Does not apply Once     senna-docusate  1 tablet Oral BID    Or     senna-docusate  2 tablet Oral BID     sodium chloride (PF)  3 mL Intracatheter Q8H     sodium chloride (PF)  9 mL Intracatheter During Hemodialysis (from stock)     sodium chloride (PF)  9 mL Intracatheter During Hemodialysis (from stock)     umeclidinium  1 puff Inhalation Daily     vitamin D3  2,000 Units Oral Daily       - MEDICATION INSTRUCTIONS -       Mita Ellsworth, NP

## 2020-01-28 NOTE — PROGRESS NOTES
01/28/20 1100   Quick Adds   Type of Visit Initial Occupational Therapy Evaluation   Living Environment   Lives With child(dari), adult;grandchild(dari);grandparent(s)   Living Arrangements house   Transportation Anticipated family or friend will provide   Living Environment Comment 4 LAVON, 12 stairs to upstairs (bathroom, bedroom, tub), 24/7 assist available   Self-Care   Usual Activity Tolerance moderate   Current Activity Tolerance fair   Regular Exercise No   Equipment Currently Used at Home   (4WW, shower chair, w/c, BSC available)   Activity/Exercise/Self-Care Comment Can walk less than a block at baseline, limited by SOB and lightheadedness   Functional Level   Ambulation 1-->assistive equipment   Transferring 1-->assistive equipment   Toileting 0-->independent   Bathing 1-->assistive equipment   Dressing 0-->independent   Cognition 0 - no cognition issues reported   Fall history within last six months no   Prior Functional Level Comment Pt uses 4WW for mobility, and needs to use stairs throughou the day, however she could use a BSC on the main level if needed. She does not complete any driving, cooking, cleaning, or medication management.    General Information   Referring Physician Jemma Barksdale MD   Patient/Family Goals Statement Return to PLOF   Additional Occupational Profile Info/Pertinent History of Current Problem 74 year old female admitted on 1/25/2020. She was sent from HD for low BP during her session. She also missed HD on Thursday 1/23/2020   Precautions/Limitations fall precautions   Cognitive Status Examination   Cognitive Comment Reports no issues at baseline, but with notable cognitive impairments on eval, will monitor further   Visual Perception   Visual Acuity Wears reading glasses   Transfer Skill: Sit to Stand   Level of [place]: Sit/Stand contact guard   Physical Assist/Nonphysical Assist: Sit/Stand set-up required;supervision;verbal cues;nonverbal cues  (demo/gestures);1 person assist   Transfer Skill: Sit to Stand full weight-bearing   Assistive Device for Transfer: Sit/Stand   (4WW)   Transfer Skill: Toilet Transfer   Level of [place]: Toilet stand-by assist   Physical Assist/Nonphysical Assist: Toilet set-up required;nonverbal cues (demo/gestures);verbal cues;supervision;1 person assist   Assistive Device grab bars   Grooming   Level of [place]: Grooming minimum assist (75% patients effort)   Physical Assist/Nonphysical Assist: Grooming set-up required;supervision;verbal cues;nonverbal cues (demo/gestures);1 person assist   Clinical Impression   Criteria for Skilled Therapeutic Interventions Met yes, treatment indicated   OT Diagnosis Decreased I in ADLs due to deconditioning   Assessment of Occupational Performance 3-5 Performance Deficits   Clinical Decision Making (Complexity) Low complexity   Therapy Frequency 5x/week   Anticipated Discharge Disposition Home with Assist;Home with Home Therapy   Risks and Benefits of Treatment have been explained. Yes   Patient, Family & other staff in agreement with plan of care Yes   Total Evaluation Time   Total Evaluation Time (Minutes) 10

## 2020-01-29 LAB
ACID FAST STN SPEC QL: NORMAL
ANION GAP SERPL CALCULATED.3IONS-SCNC: 4 MMOL/L (ref 3–14)
BASOPHILS # BLD AUTO: 0 10E9/L (ref 0–0.2)
BASOPHILS NFR BLD AUTO: 0.5 %
BUN SERPL-MCNC: 23 MG/DL (ref 7–30)
CALCIUM SERPL-MCNC: 8.2 MG/DL (ref 8.5–10.1)
CHLORIDE SERPL-SCNC: 102 MMOL/L (ref 94–109)
CO2 SERPL-SCNC: 29 MMOL/L (ref 20–32)
CREAT SERPL-MCNC: 2.17 MG/DL (ref 0.52–1.04)
DIFFERENTIAL METHOD BLD: ABNORMAL
EOSINOPHIL # BLD AUTO: 0 10E9/L (ref 0–0.7)
EOSINOPHIL NFR BLD AUTO: 0.4 %
ERYTHROCYTE [DISTWIDTH] IN BLOOD BY AUTOMATED COUNT: 15.9 % (ref 10–15)
GFR SERPL CREATININE-BSD FRML MDRD: 22 ML/MIN/{1.73_M2}
GLUCOSE BLDC GLUCOMTR-MCNC: 143 MG/DL (ref 70–99)
GLUCOSE BLDC GLUCOMTR-MCNC: 91 MG/DL (ref 70–99)
GLUCOSE BLDC GLUCOMTR-MCNC: 91 MG/DL (ref 70–99)
GLUCOSE SERPL-MCNC: 112 MG/DL (ref 70–99)
HCT VFR BLD AUTO: 29.6 % (ref 35–47)
HGB BLD-MCNC: 9.5 G/DL (ref 11.7–15.7)
IMM GRANULOCYTES # BLD: 0 10E9/L (ref 0–0.4)
IMM GRANULOCYTES NFR BLD: 0.2 %
LYMPHOCYTES # BLD AUTO: 1 10E9/L (ref 0.8–5.3)
LYMPHOCYTES NFR BLD AUTO: 12.2 %
MAGNESIUM SERPL-MCNC: 1.6 MG/DL (ref 1.6–2.3)
MCH RBC QN AUTO: 32.1 PG (ref 26.5–33)
MCHC RBC AUTO-ENTMCNC: 32.1 G/DL (ref 31.5–36.5)
MCV RBC AUTO: 100 FL (ref 78–100)
MONOCYTES # BLD AUTO: 0.3 10E9/L (ref 0–1.3)
MONOCYTES NFR BLD AUTO: 3.2 %
NEUTROPHILS # BLD AUTO: 6.9 10E9/L (ref 1.6–8.3)
NEUTROPHILS NFR BLD AUTO: 83.5 %
NRBC # BLD AUTO: 0 10*3/UL
NRBC BLD AUTO-RTO: 0 /100
PHOSPHATE SERPL-MCNC: 2.5 MG/DL (ref 2.5–4.5)
PLATELET # BLD AUTO: 163 10E9/L (ref 150–450)
POTASSIUM SERPL-SCNC: 4.1 MMOL/L (ref 3.4–5.3)
POTASSIUM SERPL-SCNC: 4.2 MMOL/L (ref 3.4–5.3)
RBC # BLD AUTO: 2.96 10E12/L (ref 3.8–5.2)
SODIUM SERPL-SCNC: 135 MMOL/L (ref 133–144)
SPECIMEN SOURCE: NORMAL
SPECIMEN SOURCE: NORMAL
T3 SERPL-MCNC: 35 NG/DL (ref 60–181)
T4 FREE SERPL-MCNC: 0.62 NG/DL (ref 0.76–1.46)
TSH SERPL DL<=0.005 MIU/L-ACNC: 31.34 MU/L (ref 0.4–4)
WBC # BLD AUTO: 8.2 10E9/L (ref 4–11)

## 2020-01-29 PROCEDURE — 25000132 ZZH RX MED GY IP 250 OP 250 PS 637: Performed by: STUDENT IN AN ORGANIZED HEALTH CARE EDUCATION/TRAINING PROGRAM

## 2020-01-29 PROCEDURE — 85025 COMPLETE CBC W/AUTO DIFF WBC: CPT | Performed by: STUDENT IN AN ORGANIZED HEALTH CARE EDUCATION/TRAINING PROGRAM

## 2020-01-29 PROCEDURE — 80048 BASIC METABOLIC PNL TOTAL CA: CPT | Performed by: STUDENT IN AN ORGANIZED HEALTH CARE EDUCATION/TRAINING PROGRAM

## 2020-01-29 PROCEDURE — 83735 ASSAY OF MAGNESIUM: CPT | Performed by: HOSPITALIST

## 2020-01-29 PROCEDURE — 90937 HEMODIALYSIS REPEATED EVAL: CPT

## 2020-01-29 PROCEDURE — 84132 ASSAY OF SERUM POTASSIUM: CPT | Performed by: HOSPITALIST

## 2020-01-29 PROCEDURE — 12000004 ZZH R&B IMCU UMMC

## 2020-01-29 PROCEDURE — 25000132 ZZH RX MED GY IP 250 OP 250 PS 637: Performed by: NURSE PRACTITIONER

## 2020-01-29 PROCEDURE — 36415 COLL VENOUS BLD VENIPUNCTURE: CPT | Performed by: STUDENT IN AN ORGANIZED HEALTH CARE EDUCATION/TRAINING PROGRAM

## 2020-01-29 PROCEDURE — 99233 SBSQ HOSP IP/OBS HIGH 50: CPT | Performed by: INTERNAL MEDICINE

## 2020-01-29 PROCEDURE — 99207 ZZC CDG-CUT & PASTE-POTENTIAL IMPACT ON LEVEL: CPT | Performed by: INTERNAL MEDICINE

## 2020-01-29 PROCEDURE — 00000146 ZZHCL STATISTIC GLUCOSE BY METER IP

## 2020-01-29 PROCEDURE — 25000128 H RX IP 250 OP 636: Performed by: NURSE PRACTITIONER

## 2020-01-29 PROCEDURE — 84100 ASSAY OF PHOSPHORUS: CPT | Performed by: HOSPITALIST

## 2020-01-29 PROCEDURE — 40000556 ZZH STATISTIC PERIPHERAL IV START W US GUIDANCE

## 2020-01-29 PROCEDURE — 25000128 H RX IP 250 OP 636: Performed by: STUDENT IN AN ORGANIZED HEALTH CARE EDUCATION/TRAINING PROGRAM

## 2020-01-29 PROCEDURE — 84443 ASSAY THYROID STIM HORMONE: CPT | Performed by: STUDENT IN AN ORGANIZED HEALTH CARE EDUCATION/TRAINING PROGRAM

## 2020-01-29 PROCEDURE — 36415 COLL VENOUS BLD VENIPUNCTURE: CPT | Performed by: HOSPITALIST

## 2020-01-29 PROCEDURE — 25000125 ZZHC RX 250: Performed by: HOSPITALIST

## 2020-01-29 PROCEDURE — 84439 ASSAY OF FREE THYROXINE: CPT | Performed by: STUDENT IN AN ORGANIZED HEALTH CARE EDUCATION/TRAINING PROGRAM

## 2020-01-29 PROCEDURE — 84480 ASSAY TRIIODOTHYRONINE (T3): CPT | Performed by: STUDENT IN AN ORGANIZED HEALTH CARE EDUCATION/TRAINING PROGRAM

## 2020-01-29 PROCEDURE — 25800030 ZZH RX IP 258 OP 636

## 2020-01-29 RX ADMIN — Medication 1 CAPSULE: at 12:06

## 2020-01-29 RX ADMIN — ASPIRIN 81 MG CHEWABLE TABLET 81 MG: 81 TABLET CHEWABLE at 08:21

## 2020-01-29 RX ADMIN — SODIUM CHLORIDE 300 ML: 9 INJECTION, SOLUTION INTRAVENOUS at 16:18

## 2020-01-29 RX ADMIN — OXYCODONE HYDROCHLORIDE 10 MG: 5 TABLET ORAL at 12:06

## 2020-01-29 RX ADMIN — Medication 5000 UNITS: at 21:58

## 2020-01-29 RX ADMIN — SODIUM CHLORIDE 250 ML: 9 INJECTION, SOLUTION INTRAVENOUS at 16:18

## 2020-01-29 RX ADMIN — UMECLIDINIUM 1 PUFF: 62.5 AEROSOL, POWDER ORAL at 08:21

## 2020-01-29 RX ADMIN — ACETAMINOPHEN 650 MG: 325 TABLET, FILM COATED ORAL at 15:56

## 2020-01-29 RX ADMIN — SENNOSIDES AND DOCUSATE SODIUM 2 TABLET: 8.6; 5 TABLET ORAL at 08:31

## 2020-01-29 RX ADMIN — FLUTICASONE PROPIONATE AND SALMETEROL 1 PUFF: 50; 250 POWDER RESPIRATORY (INHALATION) at 21:57

## 2020-01-29 RX ADMIN — AZITHROMYCIN MONOHYDRATE 250 MG: 250 TABLET ORAL at 08:21

## 2020-01-29 RX ADMIN — SENNOSIDES AND DOCUSATE SODIUM 1 TABLET: 8.6; 5 TABLET ORAL at 20:22

## 2020-01-29 RX ADMIN — ATORVASTATIN CALCIUM 40 MG: 40 TABLET, FILM COATED ORAL at 21:58

## 2020-01-29 RX ADMIN — LIDOCAINE: 40 CREAM TOPICAL at 01:31

## 2020-01-29 RX ADMIN — HYDROCORTISONE SODIUM SUCCINATE 50 MG: 100 INJECTION, POWDER, FOR SOLUTION INTRAMUSCULAR; INTRAVENOUS at 08:18

## 2020-01-29 RX ADMIN — OXYCODONE HYDROCHLORIDE 10 MG: 5 TABLET ORAL at 08:20

## 2020-01-29 RX ADMIN — ACETAMINOPHEN 650 MG: 325 TABLET, FILM COATED ORAL at 10:43

## 2020-01-29 RX ADMIN — Medication: at 08:23

## 2020-01-29 RX ADMIN — CEFTRIAXONE 2 G: 2 INJECTION, POWDER, FOR SOLUTION INTRAMUSCULAR; INTRAVENOUS at 10:43

## 2020-01-29 RX ADMIN — FLUTICASONE PROPIONATE AND SALMETEROL 1 PUFF: 50; 250 POWDER RESPIRATORY (INHALATION) at 08:22

## 2020-01-29 RX ADMIN — OXYCODONE HYDROCHLORIDE 10 MG: 5 TABLET ORAL at 15:56

## 2020-01-29 RX ADMIN — ACETAMINOPHEN 650 MG: 325 TABLET, FILM COATED ORAL at 20:21

## 2020-01-29 RX ADMIN — MELATONIN 2000 UNITS: at 08:21

## 2020-01-29 RX ADMIN — HYDROCORTISONE SODIUM SUCCINATE 50 MG: 100 INJECTION, POWDER, FOR SOLUTION INTRAMUSCULAR; INTRAVENOUS at 21:59

## 2020-01-29 RX ADMIN — OXYCODONE HYDROCHLORIDE 10 MG: 5 TABLET ORAL at 20:21

## 2020-01-29 RX ADMIN — ACETAMINOPHEN 650 MG: 325 TABLET, FILM COATED ORAL at 07:05

## 2020-01-29 RX ADMIN — ACETAMINOPHEN 650 MG: 325 TABLET, FILM COATED ORAL at 01:31

## 2020-01-29 RX ADMIN — OXYCODONE HYDROCHLORIDE 10 MG: 5 TABLET ORAL at 03:32

## 2020-01-29 RX ADMIN — LEVOTHYROXINE SODIUM 175 MCG: 25 TABLET ORAL at 08:22

## 2020-01-29 RX ADMIN — Medication 2 G: at 01:33

## 2020-01-29 RX ADMIN — DOCUSATE SODIUM 200 MG: 100 CAPSULE, LIQUID FILLED ORAL at 08:21

## 2020-01-29 RX ADMIN — Medication 5000 UNITS: at 10:43

## 2020-01-29 ASSESSMENT — PAIN DESCRIPTION - DESCRIPTORS: DESCRIPTORS: ACHING;SORE

## 2020-01-29 ASSESSMENT — ACTIVITIES OF DAILY LIVING (ADL)
ADLS_ACUITY_SCORE: 18
ADLS_ACUITY_SCORE: 19

## 2020-01-29 ASSESSMENT — MIFFLIN-ST. JEOR: SCORE: 1171.5

## 2020-01-29 NOTE — PROGRESS NOTES
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     Assessment & Recommendations:     # ESRD on TTS iHD  Patient gets HD with Dr Liz at [facility], running for 3.0 hours still via her CVC with AVF created back in 11/2019. EDW 57.5 kg.    - Patient received CRRT 1/25 -1/27   - Continue TTS schedule alternating with UF runs to achieve EDW   - Avoid venipuncture/blood pressures right arm   - AVF well developed. Surgery date was 11/22/13. Discussed with Dr Vega 1/28 and she is fine with us cannulating the AVF. Will begin with #17 g needles     # HTN / volume status - Improving volume status. No LE edema. Off supplemental oxygen. Bed Weight today is 62.3 kg which is likely unreliable given weight yesterday of 69.4 kg. EDW is 57.5 kg. Admission weight 63.5 kg.  Blood pressures today 120-140/. Not on antihypertensives at baseline   - Please stand for weights   - UF goal today is 3 kg   - Will run daily to achieve EDW     # electrolytes - No acute concerns. K 4.1, Na 135    # Acid base - No acute concerns. 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 Nadine Ellsworth, NP   [phone]    Interval History :   Nursing and provider notes from last 24 hours reviewed.  Scheduled for UF only run today    Review of Systems:   I reviewed the following systems:  GI: Denies N/V/D  Neuro:  Alert, interactive  Constitutional:  No fever or chills  CV: Denies dyspnea, CP, edema.  "    Physical Exam:   I/O last 3 completed shifts:  In: 520 [P.O.:420; I.V.:100]  Out: 2500 [Other:2500]   /74 (BP Location: Left arm)   Pulse 71   Temp 98.3  F (36.8  C) (Oral)   Resp 18   Ht 1.727 m (5' 8\")   Wt 62.3 kg (137 lb 5.6 oz)   SpO2 100%   BMI 20.88 kg/m       GENERAL APPEARANCE: Alert/responisve  PULM: lungs CTA. Breathing is non labored. Off supplemental oxygen  CV: RRR       -edema - None   GI: soft, NT, Non distended.   INTEGUMENT: no cyanosis or rash  NEURO:  alert  Access - Right TDC, Right AVF + bruit    Labs:   All labs reviewed by me  Electrolytes/Renal -   Recent Labs   Lab Test 01/29/20  0541 01/29/20  0003 01/28/20  0422 01/27/20  1000 01/27/20  0420  01/26/20  1612     --  136 136 137   < > 135   POTASSIUM 4.1 4.2 4.4 4.1 4.3   < > 5.3   CHLORIDE 102  --  106 106 106   < > 106   CO2 29  --  26 28 27   < > 25   BUN 23  --  25 12 15   < > 24   CR 2.17*  --  2.49* 1.36* 1.69*   < > 2.73*   *  --  107* 133* 134*   < > 197*   FRANCES 8.2*  --  9.1 9.8 9.3   < > 8.9   MAG  --  1.6  --   --  1.9  --  2.0   PHOS  --  2.5  --   --  2.8  --  3.2    < > = values in this interval not displayed.       CBC -   Recent Labs   Lab Test 01/29/20  0541 01/28/20  0422 01/27/20  1750 01/27/20  0420   WBC 8.2  --  14.5* 8.6   HGB 9.5*  --  11.4* 11.0*    185 207 200       LFTs -   Recent Labs   Lab Test 01/27/20  0420 01/26/20  0719 01/26/20  0550   ALKPHOS 128 125 Canceled, Test credited   BILITOTAL 0.3 0.4 Canceled, Test credited   ALT 51* 21 Canceled, Test credited   AST 47* 32 Canceled, Test credited   PROTTOTAL 6.9 5.9* Canceled, Test credited   ALBUMIN 3.5 2.5* Canceled, Test credited       Iron Panel -   Recent Labs   Lab Test 05/01/19  1320 02/16/18  0945 09/11/17  0928   IRON 48 46 73   IRONSAT 34 28 43   * 134 127         Imaging:    Current Medications:    sodium chloride 0.9%  250 mL Intravenous Once in dialysis     sodium chloride 0.9%  300 mL Hemodialysis Machine " Once     aspirin  81 mg Oral Daily     atorvastatin  40 mg Oral At Bedtime     azithromycin  250 mg Oral Daily     cefTRIAXone  2 g Intravenous Q24H     docusate sodium  200 mg Oral Daily     emollient   Topical Daily     fluticasone-salmeterol  1 puff Inhalation Q12H     gelatin absorbable  1 each Topical During Hemodialysis (from stock)     heparin ANTICOAGULANT  5,000 Units Subcutaneous Q12H     hydrocortisone sodium succinate PF  50 mg Intravenous Q12H     influenza Vac Split High-Dose  0.5 mL Intramuscular Prior to discharge     levothyroxine  175 mcg Oral QAM AC     multivitamin RENAL  1 capsule Oral Daily     - MEDICATION INSTRUCTIONS -   Does not apply Once     senna-docusate  1 tablet Oral BID    Or     senna-docusate  2 tablet Oral BID     sodium chloride (PF)  3 mL Intracatheter Q8H     umeclidinium  1 puff Inhalation Daily     vitamin D3  2,000 Units Oral Daily       - MEDICATION INSTRUCTIONS -       - MEDICATION INSTRUCTIONS -       Mita Ellsworth, NP

## 2020-01-29 NOTE — PROVIDER NOTIFICATION
Time of notification: 7:46 PM  Provider notified: Paged [employer] Cover.  Patient status:Pt is stable.  Currently on regular diet and has glucose checks ACHS.  Per MD note pt suppose to have renal diet and sliding scale coverage asked to update orders.  Temp:  [96.4  F (35.8  C)-98.6  F (37  C)] 97.6  F (36.4  C)  Pulse:  [65-76] 74  Heart Rate:  [53-77] 71  Resp:  [14-30] 16  BP: ()/() 126/70  SpO2:  [91 %-100 %] 96 %

## 2020-01-29 NOTE — PROGRESS NOTES
"                              Internal Medicine Progress Note - Gold Service  Kim Anne MRN: [id number]  Age: 74 year old, : 1945  Date: 2020         Interval History:       Patient seen and examined at bedside, She has HD yesterday. Remains hypertensive.   Denies any chest pain, no shortness of breath, no nausea, no vomiting, Complaining of pain and requesting pain medications  Being treated for Pneumonia    on contact isolation for possible reactivation TB     Last 24 hr care team notes reviewed.     ROS:  4 point ROS including Respiratory, CV, GI and , is negative      PHYSICAL EXAM    Vital signs:  Temp: 97.6  F (36.4  C) Temp src: Oral BP: 126/70 Pulse: 74 Heart Rate: 71 Resp: 16 SpO2: 96 % O2 Device: None (Room air) Oxygen Delivery: 2 LPM Height: 172.7 cm (5' 8\") Weight: 66.9 kg (147 lb 7.8 oz)  Estimated body mass index is 22.43 kg/m  as calculated from the following:    Height as of this encounter: 1.727 m (5' 8\").    Weight as of this encounter: 66.9 kg (147 lb 7.8 oz).      Intake/Output Summary (Last 24 hours) at 2020 1628  Last data filed at 2020 1200  Gross per 24 hour   Intake 830 ml   Output 1786 ml   Net -956 ml         GEN: NAD,irritable but  cooperative , AAox3  HEENT: NC/AT , well injected conjunctiva, anicteric sclera, EOMI, PERRL, MMM  Neck: trachea midline, no lymphadenopathy, JVD  CV: RRR, nl S1/S2 no mumurs  PULM: CTAB, symmetric chest rise  Abdomen: soft, non tender/distented , no HSM, no massess,  BS +   EXTREMITIES: warm, no pedal edema, peripheral pulses intact  SKIN: No rashes, sores or ulcerations  NEURO: MS: AAOx3 -  CN II-XII grossly intact, Strength no motor-sensory deficits noted    DIAGNOSTIC STUDIES  I reviewed all medications, laboratory results, and imaging over the past 24 hours. Notable for;   Results for orders placed or performed during the hospital encounter of 20 (from the past 24 hour(s))   Glucose by meter   Result Value " Ref Range    Glucose 89 70 - 99 mg/dL   Glucose by meter   Result Value Ref Range    Glucose 151 (H) 70 - 99 mg/dL   Lactic acid level STAT   Result Value Ref Range    Lactate for Sepsis Protocol 1.1 0.7 - 2.0 mmol/L   Basic metabolic panel   Result Value Ref Range    Sodium 136 133 - 144 mmol/L    Potassium 4.4 3.4 - 5.3 mmol/L    Chloride 106 94 - 109 mmol/L    Carbon Dioxide 26 20 - 32 mmol/L    Anion Gap 4 3 - 14 mmol/L    Glucose 107 (H) 70 - 99 mg/dL    Urea Nitrogen 25 7 - 30 mg/dL    Creatinine 2.49 (H) 0.52 - 1.04 mg/dL    GFR Estimate 18 (L) >60 mL/min/[1.73_m2]    GFR Estimate If Black 21 (L) >60 mL/min/[1.73_m2]    Calcium 9.1 8.5 - 10.1 mg/dL   Platelet count   Result Value Ref Range    Platelet Count 185 150 - 450 10e9/L   T3 Free   Result Value Ref Range    Free T3 <0.5 (L) 2.3 - 4.2 pg/mL   T4 free   Result Value Ref Range    T4 Free 0.48 (L) 0.76 - 1.46 ng/dL   Blood gas venous and oxyhgb   Result Value Ref Range    Ph Venous 7.40 7.32 - 7.43 pH    PCO2 Venous 44 40 - 50 mm Hg    PO2 Venous 68 (H) 25 - 47 mm Hg    Bicarbonate Venous 27 21 - 28 mmol/L    FIO2 2     Oxyhemoglobin Venous 92 %    Base Excess Venous 1.9 mmol/L   US Lower Extremity Venous Duplex Bilateral    Narrative    EXAMINATION: DOPPLER VENOUS ULTRASOUND OF BILATERAL LOWER EXTREMITIES,  1/28/2020 8:40 AM     COMPARISON: None.    HISTORY: Patient tachycardic, tachypneic with increasing O2  requirement; rule out clots    TECHNIQUE:  Gray-scale evaluation with compression, spectral flow and  color Doppler assessment of the deep venous system of both legs from  groin to knee, and then at the ankles.    FINDINGS:  In both lower extremities, the common femoral, femoral, popliteal and  posterior tibial veins demonstrate normal compressibility and blood  flow.      Impression    IMPRESSION:  No evidence of deep venous thrombosis in either lower extremity.    I have personally reviewed the examination and initial interpretation  and I agree  with the findings.    NICHOLAS MARX MD   US Upper Extremity Venous Duplex Bilat    Narrative    EXAMINATION: DOPPLER VENOUS ULTRASOUND OF BILATERAL UPPER EXTREMITIES,  1/28/2020 8:40 AM     COMPARISON: 1/16/2018.    HISTORY: Patient tachycardic, tachypnea with increasing O2  requirement; rule out clots    TECHNIQUE:  Gray-scale evaluation with compression, spectral flow, and  color Doppler assessment of the deep venous system of both upper  extremities.    FINDINGS:  Normal blood flow and waveforms are demonstrated in the internal  jugular, innominate, subclavian bilaterally and the left axillary  vein. The right axillary vein is not visualized due to overlying  bandages and catheter. There is normal compressibility of the  bilateral brachial and basilic veins and the right cephalic vein. The  left cephalic vein is not visualized due to overlying bandage.      Impression    IMPRESSION:  No evidence of deep venous thrombosis in the visualized right and left  upper extremity veins. The right axillary and left cephalic veins are  not visualized due to overlying bandages.    I have personally reviewed the examination and initial interpretation  and I agree with the findings.    NICHOLAS MARX MD   Glucose by meter   Result Value Ref Range    Glucose 122 (H) 70 - 99 mg/dL     *Note: Due to a large number of results and/or encounters for the requested time period, some results have not been displayed. A complete set of results can be found in Results Review.           MICROBIOLOGY  No growth       Assessment and Plan:     Date of admission: 1/25/2020  Kim Anne is a 74 year old female admitted on 1/25/2020. She was sent from HD for low BP during her session. She also missed HD on Thursday 1/23/2020. Patient is not a good historian and family member ( Son Ki Jordan) does  not know much as well.   She was found to have hyperkalemia in the ER and Her BP has been low to undergo dialysis. She was  admitted to ICU, received IV synthroid, CRRT and antibiotics   Her BP has improved she was noted to be struggling to breath and nephrology had to dialyse for relief    Plan for today  - remains stable off pressors. Cont HD per nephro  -cont antibiotics  - awaiting TB results.    #Hypotension cause unknown< multifactorial   Ddx: unknown thought to be secondary to hypovolemia vs hypothyroidism less likely sepsis given clinical picture   -s/p 750 ml fluid without adequate response  -Bedside ECHO  EF 60-65%  -Not currently on midodrine at home prior to HD   -Giveen 25G of albumin 25% and 25G 1/27/2020   -off pressors now  -hypertensive today     #Concern for potential sepsis   -A.Febrile, no leukocytosis, procal 0.17   -Unclear reason specially for hypotension as not super suspicious of sepsis   Workup: Flu -/MRSA -,  blood, sputum cx, and RVP neg to date  - Random Cortisol 11,    Antimicrobials/Antivirals:  Zosyn (1/25) and Vanc 1/25     #ESRD (CKD 4) on HD (T, Th, Sat)    Per pt, she missed her Thursday session on 1/23/19   -during dialysis session 1/25, became severely hypotensive run was stopped   -R tunneled HD line w/ old left side fistula   -Cont CRRT w/ plan to transitional to HD tomorrow   -She looks in distress using accessory muscles with hypertension. Given she did not have fluid dialysis she might be fluid overloaded. Nephrology to do ultrafiltration 1/27 and HD 1/28  -Nephro consulted     #COPD hx  -Cont Spiriva   -improving, most likely hypoventilation due to lethargy thought to be c/w hypothyroidism on top of hx of COPD.     -No current issues, currently on room air   -Supplemental oxygen to keep saturation above 92 %.  - Incentive spirometer every 15- 30 minutes when awake.     # History of Tuberculosis   - known TB infection in the 70s for which she was treated. Per - patient needs three negative sputum cultures to r/o active TB two negative cultures 3rd is pending  - per protocol cont negative  pressure room.   -repeat sputum culture pending   -if negative, pt can be removed from isolation      #Hx of MI  #CAD   Pt had an MI on 5/18/11, current EKG benign with normal sinus rhythm and QTc 454.   -trop negative at 0.05.   -Continue PTA 81 mg ASA      #Hyperkalemia: resolved    -Unknown specific reason, attempted HD 1/26 however stopped due to hypotension.   -attempted to be shifted multiple times in the ED however was refractory   -Per renal, 2K baths, and increased Dialysate rate      #Hypothyroidism  Per chart review, her thyroid function has not been normal.   Per history she is non-compliance with her medication for about 2 weeks    -TSH 97.20, free T4 0.23 on 1/25.  -No s/s of  myxedema coma at this time   -discontinue PO and Start IV levothyroxine 150 mg daily per endo recs   - Checking total T3 and free T4 tomorrow     #DM2   No PTA meds on med list. Current blood glucose stable at 115.   -on sliding scale   -on hypoglycemia protocol      Diet-Renal diet   Code: full Code  DVT PPX: Heparin     Cristina Esquivel MD   Internal Medicine Hospitalist   [facility]  Pager: [phone]    Team: Lorena Hooper   Page Cross Cover between 5pm-8am: pager [phone] (Rufus), if no response then page job code [phone].

## 2020-01-29 NOTE — PROGRESS NOTES
Brief Endocrine Progress Note    73yo F with h/o CAD, COPD, ESRD on HD, T2DM admitted with post-HD hypotension and found to have significantly abnormal thyroid labs in the setting of chronic hypothyroidism - poorly controlled due to medication non-adherence, patient reports due to insurance issues and lack of transportation to get her medications; therefore she doesn't take her medications, she says.    Free T4 continues to improve, with lagging increase in T3 due to suspected component of sick thyroid. TSH also is reduced compared with prior to admission.    - continue 175mcg daily in AM  - check free T4, total T3, and TSH prior to discharge or in 1 week if still in hospital to ensure absorption is not an issue; please page Endocrine with results if free T4 is not wnl or pattern is unexpected (does not continue improving trend)  - should have follow up with PCP and repeat labs 4 weeks post-discharge  - Endocrine will follow peripherally    Staffed with Dr. Lepe.    Miriam Villa MD  Internal Medicine/Pediatrics, PGY3  [facility]  (p) [phone]     I have reviewed the resident's note, and agree with the plan of care.  MADONNA Jonas

## 2020-01-29 NOTE — PROGRESS NOTES
HEMODIALYSIS TREATMENT NOTE    Date: 1/29/2020  Time: 5:28 PM    Data:  Pre Wt: 62.3 kg (137 lb 5.6 oz)   Desired Wt: 59.3 kg   Post Wt: 66.9 kg (147 lb 7.8 oz)  Weight change: 2.5 kg  Ultrafiltration - Post Run Net Total Removed (mL): 2500 mL  Vascular Access Status: patent  Dialyzer Rinse: Light  Total Blood Volume Processed: 0 L Liters  Total Dialysis (Treatment) Time: 2.5 hours    Lab:   no    Interventions:Assessments  Pt had a UF only tx  today for 2.5hrs. Started tx using RAVF. Infiltrated both V and A needles due to bending her arm. At this point we switched over to RCVC. Pt ate during run and rested the remainder of UF tx.      Plan:    Per renal team

## 2020-01-29 NOTE — PROGRESS NOTES
HEMODIALYSIS TREATMENT NOTE    Date: 1/28/2020  Time: 6:54 PM    Data:  Pre Wt: 69.4 kg   Desired Wt: 66.4 kg   Post Wt: 66.9 kg   Weight change: -2.5 kg  Ultrafiltration - Post Run Net Total Removed (mL): 2500 mL  Vascular Access Status: maturing RUE AVF with +thrill & bruit; not yet cannulated.  Tunneled RIJ CVC with dressing CDI utilized for HD; lumens saline locked post-HD and new Clear Guard caps applied.  ; lines reversed.  Dialyzer Rinse: Light  Total Blood Volume Processed: 76.82 L   Total Dialysis (Treatment) Time: 3.5 hours   Dialysate Bath: K 3, Ca 2.25  Heparin: None    Lab:   No    Assessment:  Forgetful but oriented.  Cooperative.  Airborne precautions while TB is ruled out.  Maturing AVF at RUE.  C/o pain in feet.      Interventions:  Patient dialyzed for 3.5 hours via tunneled RIJ CVC with dailysate K3/Ca2.25/Na138/Bntioq10 and net UF 2.5 L.  UF limited by cramping in hands; resolved when UF stopped.  Otherwise treatment well tolerated and patient vitally stable throughout.  Oxy, APAP & Venofer given; see MAR.      Plan:    Next HD per renal team.  Handoff report given to NICOLE Pascual.

## 2020-01-29 NOTE — PLAN OF CARE
Neuro: A&Ox4. Pt is forgetful at baseline and gets up without calling.  Cardiac: SR. VSS.   Respiratory: Sating 95%> on RA.  GI/: Minimal urine output. BM X1  Diet/appetite: Tolerating reg diet. Eating well.  Activity:  Assist of 1 up to chair and in halls.  Pain: At acceptable level on current regimen.   Skin: No new deficits noted.      Plan: Continue with POC. Notify primary team with changes.

## 2020-01-30 LAB
ACID FAST STN SPEC QL: NORMAL
ANION GAP SERPL CALCULATED.3IONS-SCNC: 9 MMOL/L (ref 3–14)
BASOPHILS # BLD AUTO: 0 10E9/L (ref 0–0.2)
BASOPHILS NFR BLD AUTO: 0.6 %
BUN SERPL-MCNC: 45 MG/DL (ref 7–30)
CALCIUM SERPL-MCNC: 8.6 MG/DL (ref 8.5–10.1)
CHLORIDE SERPL-SCNC: 101 MMOL/L (ref 94–109)
CO2 SERPL-SCNC: 25 MMOL/L (ref 20–32)
CREAT SERPL-MCNC: 3.81 MG/DL (ref 0.52–1.04)
DIFFERENTIAL METHOD BLD: ABNORMAL
EOSINOPHIL # BLD AUTO: 0 10E9/L (ref 0–0.7)
EOSINOPHIL NFR BLD AUTO: 0.3 %
ERYTHROCYTE [DISTWIDTH] IN BLOOD BY AUTOMATED COUNT: 15.8 % (ref 10–15)
GFR SERPL CREATININE-BSD FRML MDRD: 11 ML/MIN/{1.73_M2}
GLUCOSE BLDC GLUCOMTR-MCNC: 121 MG/DL (ref 70–99)
GLUCOSE BLDC GLUCOMTR-MCNC: 156 MG/DL (ref 70–99)
GLUCOSE BLDC GLUCOMTR-MCNC: 160 MG/DL (ref 70–99)
GLUCOSE SERPL-MCNC: 142 MG/DL (ref 70–99)
HCT VFR BLD AUTO: 31.8 % (ref 35–47)
HGB BLD-MCNC: 10.1 G/DL (ref 11.7–15.7)
IMM GRANULOCYTES # BLD: 0 10E9/L (ref 0–0.4)
IMM GRANULOCYTES NFR BLD: 0.4 %
LYMPHOCYTES # BLD AUTO: 0.8 10E9/L (ref 0.8–5.3)
LYMPHOCYTES NFR BLD AUTO: 11.3 %
MCH RBC QN AUTO: 31.8 PG (ref 26.5–33)
MCHC RBC AUTO-ENTMCNC: 31.8 G/DL (ref 31.5–36.5)
MCV RBC AUTO: 100 FL (ref 78–100)
MONOCYTES # BLD AUTO: 0.2 10E9/L (ref 0–1.3)
MONOCYTES NFR BLD AUTO: 2.8 %
NEUTROPHILS # BLD AUTO: 6 10E9/L (ref 1.6–8.3)
NEUTROPHILS NFR BLD AUTO: 84.6 %
NRBC # BLD AUTO: 0 10*3/UL
NRBC BLD AUTO-RTO: 0 /100
PLATELET # BLD AUTO: 191 10E9/L (ref 150–450)
POTASSIUM SERPL-SCNC: 5 MMOL/L (ref 3.4–5.3)
RBC # BLD AUTO: 3.18 10E12/L (ref 3.8–5.2)
SODIUM SERPL-SCNC: 135 MMOL/L (ref 133–144)
SPECIMEN SOURCE: NORMAL
WBC # BLD AUTO: 7.1 10E9/L (ref 4–11)

## 2020-01-30 PROCEDURE — 25800030 ZZH RX IP 258 OP 636

## 2020-01-30 PROCEDURE — 40000802 ZZH SITE CHECK

## 2020-01-30 PROCEDURE — 80048 BASIC METABOLIC PNL TOTAL CA: CPT | Performed by: INTERNAL MEDICINE

## 2020-01-30 PROCEDURE — 85025 COMPLETE CBC W/AUTO DIFF WBC: CPT | Performed by: INTERNAL MEDICINE

## 2020-01-30 PROCEDURE — 25000132 ZZH RX MED GY IP 250 OP 250 PS 637: Performed by: NURSE PRACTITIONER

## 2020-01-30 PROCEDURE — 12000004 ZZH R&B IMCU UMMC

## 2020-01-30 PROCEDURE — 25000128 H RX IP 250 OP 636: Performed by: STUDENT IN AN ORGANIZED HEALTH CARE EDUCATION/TRAINING PROGRAM

## 2020-01-30 PROCEDURE — 99233 SBSQ HOSP IP/OBS HIGH 50: CPT | Performed by: INTERNAL MEDICINE

## 2020-01-30 PROCEDURE — 99207 ZZC CDG-CUT & PASTE-POTENTIAL IMPACT ON LEVEL: CPT | Performed by: INTERNAL MEDICINE

## 2020-01-30 PROCEDURE — 25000128 H RX IP 250 OP 636: Performed by: NURSE PRACTITIONER

## 2020-01-30 PROCEDURE — 25000132 ZZH RX MED GY IP 250 OP 250 PS 637: Performed by: STUDENT IN AN ORGANIZED HEALTH CARE EDUCATION/TRAINING PROGRAM

## 2020-01-30 PROCEDURE — 90937 HEMODIALYSIS REPEATED EVAL: CPT

## 2020-01-30 PROCEDURE — 36415 COLL VENOUS BLD VENIPUNCTURE: CPT | Performed by: INTERNAL MEDICINE

## 2020-01-30 PROCEDURE — 00000146 ZZHCL STATISTIC GLUCOSE BY METER IP

## 2020-01-30 PROCEDURE — 40000141 ZZH STATISTIC PERIPHERAL IV START W/O US GUIDANCE

## 2020-01-30 RX ADMIN — SODIUM CHLORIDE 300 ML: 9 INJECTION, SOLUTION INTRAVENOUS at 08:23

## 2020-01-30 RX ADMIN — FLUTICASONE PROPIONATE AND SALMETEROL 1 PUFF: 50; 250 POWDER RESPIRATORY (INHALATION) at 20:42

## 2020-01-30 RX ADMIN — Medication: at 08:24

## 2020-01-30 RX ADMIN — HYDROCORTISONE SODIUM SUCCINATE 50 MG: 100 INJECTION, POWDER, FOR SOLUTION INTRAMUSCULAR; INTRAVENOUS at 12:16

## 2020-01-30 RX ADMIN — OXYCODONE HYDROCHLORIDE 10 MG: 5 TABLET ORAL at 00:54

## 2020-01-30 RX ADMIN — FLUTICASONE PROPIONATE AND SALMETEROL 1 PUFF: 50; 250 POWDER RESPIRATORY (INHALATION) at 12:13

## 2020-01-30 RX ADMIN — ACETAMINOPHEN 650 MG: 325 TABLET, FILM COATED ORAL at 04:45

## 2020-01-30 RX ADMIN — UMECLIDINIUM 1 PUFF: 62.5 AEROSOL, POWDER ORAL at 12:13

## 2020-01-30 RX ADMIN — ATORVASTATIN CALCIUM 40 MG: 40 TABLET, FILM COATED ORAL at 20:40

## 2020-01-30 RX ADMIN — OXYCODONE HYDROCHLORIDE 10 MG: 5 TABLET ORAL at 22:20

## 2020-01-30 RX ADMIN — SENNOSIDES AND DOCUSATE SODIUM 1 TABLET: 8.6; 5 TABLET ORAL at 12:15

## 2020-01-30 RX ADMIN — ASPIRIN 81 MG CHEWABLE TABLET 81 MG: 81 TABLET CHEWABLE at 12:15

## 2020-01-30 RX ADMIN — SODIUM CHLORIDE 250 ML: 9 INJECTION, SOLUTION INTRAVENOUS at 08:24

## 2020-01-30 RX ADMIN — Medication: at 12:14

## 2020-01-30 RX ADMIN — ACETAMINOPHEN 650 MG: 325 TABLET, FILM COATED ORAL at 00:54

## 2020-01-30 RX ADMIN — AZITHROMYCIN MONOHYDRATE 250 MG: 250 TABLET ORAL at 12:16

## 2020-01-30 RX ADMIN — CEFTRIAXONE 2 G: 2 INJECTION, POWDER, FOR SOLUTION INTRAMUSCULAR; INTRAVENOUS at 12:13

## 2020-01-30 RX ADMIN — SENNOSIDES AND DOCUSATE SODIUM 2 TABLET: 8.6; 5 TABLET ORAL at 20:39

## 2020-01-30 RX ADMIN — DOCUSATE SODIUM 200 MG: 100 CAPSULE, LIQUID FILLED ORAL at 12:16

## 2020-01-30 RX ADMIN — MELATONIN 2000 UNITS: at 12:15

## 2020-01-30 RX ADMIN — Medication 1 CAPSULE: at 12:15

## 2020-01-30 RX ADMIN — ACETAMINOPHEN 650 MG: 325 TABLET, FILM COATED ORAL at 12:42

## 2020-01-30 RX ADMIN — HYDROCORTISONE SODIUM SUCCINATE 50 MG: 100 INJECTION, POWDER, FOR SOLUTION INTRAMUSCULAR; INTRAVENOUS at 20:40

## 2020-01-30 RX ADMIN — Medication 5000 UNITS: at 20:42

## 2020-01-30 RX ADMIN — Medication 5000 UNITS: at 12:15

## 2020-01-30 RX ADMIN — OXYCODONE HYDROCHLORIDE 10 MG: 5 TABLET ORAL at 04:45

## 2020-01-30 RX ADMIN — Medication 1 MG: at 00:54

## 2020-01-30 RX ADMIN — OXYCODONE HYDROCHLORIDE 10 MG: 5 TABLET ORAL at 12:42

## 2020-01-30 RX ADMIN — ACETAMINOPHEN 650 MG: 325 TABLET, FILM COATED ORAL at 20:39

## 2020-01-30 RX ADMIN — LEVOTHYROXINE SODIUM 175 MCG: 25 TABLET ORAL at 12:14

## 2020-01-30 ASSESSMENT — ACTIVITIES OF DAILY LIVING (ADL)
ADLS_ACUITY_SCORE: 19
ADLS_ACUITY_SCORE: 18
ADLS_ACUITY_SCORE: 17
ADLS_ACUITY_SCORE: 19

## 2020-01-30 ASSESSMENT — PAIN DESCRIPTION - DESCRIPTORS
DESCRIPTORS: ACHING;SORE
DESCRIPTORS: ACHING
DESCRIPTORS: ACHING;SORE
DESCRIPTORS: ACHING

## 2020-01-30 ASSESSMENT — MIFFLIN-ST. JEOR: SCORE: 1160.5

## 2020-01-30 NOTE — PLAN OF CARE
Time of notification: 2:15 AM  Provider notified:Paged Gold Cross Cover as pt had 10 beats of SVT at around 0200.  Patient status:Pt vitals stable.  Pt was sleep no CP, pt was Diaphoretic .  Spoke with MD via phone continue to monitor and no new orders at this time.  Temp:  [96.8  F (36  C)-98.5  F (36.9  C)] 96.8  F (36  C)  Pulse:  [62-71] 65  Heart Rate:  [60-72] 71  Resp:  [16-20] 20  BP: (107-168)/(61-94) 168/94  SpO2:  [89 %-100 %] 96 %

## 2020-01-30 NOTE — PLAN OF CARE
Neuro: A&Ox4. Call light with reach.  Bed alarm is off pt calls appropriately   Cardiac: SR 60-70. B/P little elevated over night 150-160 systolic 70-94 diasystolic   Respiratory: Sating 95%> on RA.  GI/: Little urine output over night as pt is on HD through rt CVC line and rt upper arm fistula.    Diet/appetite: Tolerating reg diet. Eating well.  Pt is on BG checks ACHS.  Activity:  Assist of 1, up to chair and in halls.  Pain: At acceptable level on current regimen.   Skin: No new deficits noted.      Plan: Continue with POC. Notify primary team with changes.

## 2020-01-30 NOTE — PLAN OF CARE
[id number]:  Neuro: A&Ox4. Pt is forgetful baseline, asks same questions several times during one nurse visit. Doing better with calling before getting up.  Cardiac: SR. VSS.       Respiratory: O2 sats mid-90s-100% on RA.  GI/: Minimal urine output. BM X1  Diet/appetite: Tolerating reg diet. Eating well.  Activity:  Assist of 1 to BR and chair.  Pain: At acceptable level on current regimen. Oxycodone and Tylenol, both PRN, given q4 hrs.  Skin: No new deficits noted.    Pt went to dialysis at about 1600. Returned at 1910. 2.5L pulled of.        Plan: Continue with POC. Notify primary team with changes/concerns.

## 2020-01-30 NOTE — PROGRESS NOTES
HEMODIALYSIS TREATMENT NOTE    Date: 1/30/2020  Time: 12:32 PM    Data:  Pre Wt: 61.2 kg (134 lb 14.7 oz)   Desired Wt: 58.2 kg   Post Wt: 58.7 kg (129 lb 6.6 oz)  Weight change: 2.5 kg  Ultrafiltration - Post Run Net Total Removed (mL): 2500 mL  Vascular Access Status: Positive Bruit and thrill on TOÑO AVF/ Patent RIJ CVC Tunneled Dpouble line.  Dialyzer Rinse: Streaked, Light  Total Blood Volume Processed: 67.51 L   Total Dialysis (Treatment) Time: 3 hrs 30 mins   Dialysate Bath: K 2/ Ca 2.5  No dialysis heparin.    Lab:  NO    Assessment:  Positive Bruit and thrill on TOÑO AVF.   Patent RIJ CVC Tunneled Double line.  Pre run K/Ca: 5.0/ 8.6, BUN/Cr: 45/3.81  HB s Ag and Ab : (-)/ 101.4 at 11-  Dialysis consent signed at 12-11-19    Interventions:  Patient dialyzed for 3hrs 30 mins. Initiated HD via TOÑO AVF with 17 G fistula needles. But 10 mins after initiation HD, Pt ate during run then infiltrated fistula needle for Venous. Resumed HD via RIJ CVC HD line. Gave ice pack on infiltrated area. Stable V/S and tolerable for 3 hrs . Finished HD with rinse backm CVC NS locked with clear guards caps.      Plan:    Next run per renal team.

## 2020-01-30 NOTE — PROGRESS NOTES
"                              Internal Medicine Progress Note - Gold Service  Kim Anne MRN: [id number]  Age: 74 year old, : 1945  Date: 2020         Interval History:       Patient seen and examined at bedside, She has HD yesterday.  BP  much improved today. Electrolytes stable as well.  Denies any chest pain, no shortness of breath, no nausea, no vomiting, Offers no new complaints. Asking about discharge planning  Being treated for Pneumonia    on contact isolation for possible reactivation TB till 3 negative sputums    Last 24 hr care team notes reviewed.     ROS:  4 point ROS including Respiratory, CV, GI and , is negative      PHYSICAL EXAM    Vital signs:  Temp: 96.8  F (36  C) Temp src: Axillary BP: 112/67 Pulse: 70 Heart Rate: 71 Resp: 20 SpO2: 97 % O2 Device: None (Room air) Oxygen Delivery: 2 LPM Height: 172.7 cm (5' 8\") Weight: 62.3 kg (137 lb 5.6 oz)  Estimated body mass index is 20.88 kg/m  as calculated from the following:    Height as of this encounter: 1.727 m (5' 8\").    Weight as of this encounter: 62.3 kg (137 lb 5.6 oz).      Intake/Output Summary (Last 24 hours) at 2020 1628  Last data filed at 2020 1200  Gross per 24 hour   Intake 830 ml   Output 1786 ml   Net -956 ml     GEN: NAD, cooperative , AAox3  HEENT: NC/AT , well injected conjunctiva, anicteric sclera, EOMI, PERRL, MMM  Neck: trachea midline, no lymphadenopathy, JVD  CV: RRR, nl S1/S2 no mumurs  PULM: CTAB, symmetric chest rise  Abdomen: soft, non tender/distented , no HSM, no massess,  BS +   EXTREMITIES: warm, no pedal edema, peripheral pulses intact  SKIN: No rashes, sores or ulcerations  NEURO: MS: AAOx3 -  CN II-XII grossly intact, Strength no motor-sensory deficits noted    DIAGNOSTIC STUDIES  I reviewed all medications, laboratory results, and imaging over the past 24 hours. Notable for;   Results for orders placed or performed during the hospital encounter of 20 (from the past 24 " hour(s))   Potassium   Result Value Ref Range    Potassium 4.2 3.4 - 5.3 mmol/L   Magnesium   Result Value Ref Range    Magnesium 1.6 1.6 - 2.3 mg/dL   Phosphorus   Result Value Ref Range    Phosphorus 2.5 2.5 - 4.5 mg/dL   TSH   Result Value Ref Range    TSH 31.34 (H) 0.40 - 4.00 mU/L   T4 free   Result Value Ref Range    T4 Free 0.62 (L) 0.76 - 1.46 ng/dL   T3 total   Result Value Ref Range    Triiodothyronine (T3) 35 (L) 60 - 181 ng/dL   CBC with platelets differential   Result Value Ref Range    WBC 8.2 4.0 - 11.0 10e9/L    RBC Count 2.96 (L) 3.8 - 5.2 10e12/L    Hemoglobin 9.5 (L) 11.7 - 15.7 g/dL    Hematocrit 29.6 (L) 35.0 - 47.0 %     78 - 100 fl    MCH 32.1 26.5 - 33.0 pg    MCHC 32.1 31.5 - 36.5 g/dL    RDW 15.9 (H) 10.0 - 15.0 %    Platelet Count 163 150 - 450 10e9/L    Diff Method Automated Method     % Neutrophils 83.5 %    % Lymphocytes 12.2 %    % Monocytes 3.2 %    % Eosinophils 0.4 %    % Basophils 0.5 %    % Immature Granulocytes 0.2 %    Nucleated RBCs 0 0 /100    Absolute Neutrophil 6.9 1.6 - 8.3 10e9/L    Absolute Lymphocytes 1.0 0.8 - 5.3 10e9/L    Absolute Monocytes 0.3 0.0 - 1.3 10e9/L    Absolute Eosinophils 0.0 0.0 - 0.7 10e9/L    Absolute Basophils 0.0 0.0 - 0.2 10e9/L    Abs Immature Granulocytes 0.0 0 - 0.4 10e9/L    Absolute Nucleated RBC 0.0    Basic metabolic panel   Result Value Ref Range    Sodium 135 133 - 144 mmol/L    Potassium 4.1 3.4 - 5.3 mmol/L    Chloride 102 94 - 109 mmol/L    Carbon Dioxide 29 20 - 32 mmol/L    Anion Gap 4 3 - 14 mmol/L    Glucose 112 (H) 70 - 99 mg/dL    Urea Nitrogen 23 7 - 30 mg/dL    Creatinine 2.17 (H) 0.52 - 1.04 mg/dL    GFR Estimate 22 (L) >60 mL/min/[1.73_m2]    GFR Estimate If Black 25 (L) >60 mL/min/[1.73_m2]    Calcium 8.2 (L) 8.5 - 10.1 mg/dL   Glucose by meter   Result Value Ref Range    Glucose 91 70 - 99 mg/dL   Glucose by meter   Result Value Ref Range    Glucose 143 (H) 70 - 99 mg/dL     *Note: Due to a large number of results  and/or encounters for the requested time period, some results have not been displayed. A complete set of results can be found in Results Review.     MICROBIOLOGY  No growth       Assessment and Plan:     Date of admission: 1/25/2020  Kim Anne is a 74 year old female admitted on 1/25/2020. She was sent from HD for low BP during her session. She also missed HD on Thursday 1/23/2020. Patient is not a good historian and family member ( Son Ki Jordan) does  not know much as well.   She was found to have hyperkalemia in the ER and Her BP has been low to undergo dialysis. She was admitted to ICU, received IV synthroid, CRRT and antibiotics   Her BP has improved she was noted to be struggling to breath and nephrology had to dialyse for relief in the ICU. Her respiratory status and BP improved and she was transferred out to the floors    Plan for today  - Cont HD per nephro  -cont antibiotics  - awaiting TB sputums results to lift isolation    #Hypotension cause unknown< multifactorial   Ddx: unknown thought to be secondary to hypovolemia vs hypothyroidism less likely sepsis given clinical picture   -s/p 750 ml fluid without adequate response  -Bedside ECHO  EF 60-65%  -Not currently on midodrine at home prior to HD   -Given 25G of albumin 25% and 25G 1/27/2020   -off pressors now  -normotensive today    #Concern for potential sepsis   -A.Febrile, no leukocytosis, procal 0.17   -Unclear reason specially for hypotension as not super suspicious of sepsis   Workup: Flu -/MRSA -,  blood, sputum cx, and RVP neg to date  - Random Cortisol 11,    Antimicrobials/Antivirals:  Zosyn (1/25) and Vanc 1/25     #ESRD (CKD 4) on HD (T, Th, Sat)    Per pt, she missed her Thursday session on 1/23/19   -during dialysis session 1/25, became severely hypotensive run was stopped   -R tunneled HD line w/ old left side fistula   -OFF CRRT, now tolerating HD  -She looks in distress using accessory muscles with hypertension.  Given she did not have fluid dialysis she might be fluid overloaded. Nephrology did ultrafiltration 1/27 and HD 1/28 1/29 with marked improvement. Next HD 1/30  -Nephro consulted     #COPD hx  -Cont Spiriva   -improving, most likely hypoventilation due to lethargy thought to be c/w hypothyroidism on top of hx of COPD.     -No current issues, currently on room air   -Supplemental oxygen to keep saturation above 92 %.  - Incentive spirometer every 15- 30 minutes when awake.     # History of Tuberculosis   - known TB infection in the 70s for which she was treated. Per - patient needs three negative sputum cultures to r/o active TB two negative cultures 3rd is pending  - per protocol cont negative pressure room.   -repeat 3rd sputum culture pending   -if negative, pt can be removed from isolation      #Hx of MI  #CAD   Pt had an MI on 5/18/11, current EKG benign with normal sinus rhythm and QTc 454.   -trop negative at 0.05.   -Continue PTA 81 mg ASA      #Hyperkalemia: resolved    -Unknown specific reason, attempted HD 1/26 however stopped due to hypotension.   -attempted to be shifted multiple times in the ED however was refractory   - now resolved     #Hypothyroidism  Per chart review, her thyroid function has not been normal.   Per history she is non-compliance with her medication for about 2 weeks    -TSH 97.20, free T4 0.23 on 1/25.  -No s/s of  myxedema coma at this time   -discontinue PO and Started IV levothyroxine 150 mg daily per endo recs and transitioned to 175mcg Po daily  - will follow endo recs on synthroid.     #DM2   No PTA meds on med list. Current blood glucose stable   -on sliding scale   -on hypoglycemia protocol      Diet-Renal diet   DISPO: .disp  Code: full Code  DVT PPX: Heparin     Cristina Esquivel MD   Internal Medicine Hospitalist   [facility]  Pager: [phone]    Team: Lorena Hooper   Page Cross Cover between 5pm-8am: pager [phone] (Rufus), if no response then page job  code [phone].

## 2020-01-30 NOTE — PROGRESS NOTES
Nephrology Progress Note  01/30/2020      Kim Anne is a 74 year old female with h/o ESRD (biopsy proven DKD, remote kidney donation prior to CKD) on TTS iHD, COPD not on home O2, and HTN presenting to the ED from dialysis due to hypotension and hyperkalemia to 6s.     Assessment & Recommendations:     # ESRD on TTS iHD  Patient gets HD with Dr Liz at [facility], running for 3.0 hours still via her CVC with AVF created back in 11/2019. EDW 57.5 kg.    - Patient received CRRT 1/25 -1/27   - Continue TTS schedule alternating with UF runs to achieve EDW   - Avoid venipuncture/blood pressures right arm   - AVF well developed. Surgery date was 11/22/13. Discussed with Dr Vega 1/28 and she was fine with us cannulating the AVF. We were able to cannulate w/o difficulty on 1/29 and 1/30 but patient moved her arm and infiltrated. Resumed use of TDC    # HTN / volume status - Improving volume status. No LE edema. Off supplemental oxygen. Bed Weight today is 61.2 kg. EDW is 57.5 kg. Admission weight 63.5 kg.  Blood pressures today teens/ . Not on antihypertensives at baseline   - Please stand for weights   - UF goal today is 3 kg   - Will run daily to achieve EDW     # electrolytes - No acute concerns. K 5.0, Na 135    # Acid base - No acute concerns. Bicarb 25    # anemia - Hgb 10.1    - On Mircera ( will hold here)   - Have completed Venofer course that was started in OP HD unit, will begin 50 mg q Tuesday    # MBD-CKD - Ica 5.0, Phos 2.8, albumin 3.5   - Holding Calcitriol 0.5 mcg IV q run    Recommendations were communicated to primary team via progress note    Seen and discussed with Dr. Nadine Ellsworth, NP   [phone]    Interval History :   Nursing and provider notes from last 24 hours reviewed.  Unfortunately bent her arm when needles placed in AVF and infiltrated yesterday and today.     Review of Systems:   I reviewed the following systems:  GI: Denies N/V/D  Neuro:  Alert,  "interactive  Constitutional:  No fever or chills  CV: Denies dyspnea, CP, edema.     Physical Exam:   I/O last 3 completed shifts:  In: 840 [P.O.:840]  Out: 2550 [Urine:50; Other:2500]   /59   Pulse 57   Temp 97.9  F (36.6  C) (Oral)   Resp 18   Ht 1.727 m (5' 8\")   Wt 61.2 kg (134 lb 14.7 oz)   SpO2 100%   BMI 20.51 kg/m       GENERAL APPEARANCE: Comfortable on HD  PULM: lungs CTA. Breathing is non labored. Off supplemental oxygen  CV: RRR       -edema - None   GI: soft, NT, Non distended.   INTEGUMENT: no cyanosis or rash  NEURO:  Alert/interactive  Access - Right TDC, Right AVF + bruit    Labs:   All labs reviewed by me  Electrolytes/Renal -   Recent Labs   Lab Test 01/30/20 0543 01/29/20  0541 01/29/20  0003 01/28/20 0422 01/27/20 0420 01/26/20  1612    135  --  136   < > 137   < > 135   POTASSIUM 5.0 4.1 4.2 4.4   < > 4.3   < > 5.3   CHLORIDE 101 102  --  106   < > 106   < > 106   CO2 25 29  --  26   < > 27   < > 25   BUN 45* 23  --  25   < > 15   < > 24   CR 3.81* 2.17*  --  2.49*   < > 1.69*   < > 2.73*   * 112*  --  107*   < > 134*   < > 197*   FRANCES 8.6 8.2*  --  9.1   < > 9.3   < > 8.9   MAG  --   --  1.6  --   --  1.9  --  2.0   PHOS  --   --  2.5  --   --  2.8  --  3.2    < > = values in this interval not displayed.       CBC -   Recent Labs   Lab Test 01/30/20 0543 01/29/20  0541 01/28/20 0422 01/27/20  1750   WBC 7.1 8.2  --  14.5*   HGB 10.1* 9.5*  --  11.4*    163 185 207       LFTs -   Recent Labs   Lab Test 01/27/20 0420 01/26/20  0719 01/26/20  0550   ALKPHOS 128 125 Canceled, Test credited   BILITOTAL 0.3 0.4 Canceled, Test credited   ALT 51* 21 Canceled, Test credited   AST 47* 32 Canceled, Test credited   PROTTOTAL 6.9 5.9* Canceled, Test credited   ALBUMIN 3.5 2.5* Canceled, Test credited       Iron Panel -   Recent Labs   Lab Test 05/01/19  1320 02/16/18  0945 09/11/17  0928   IRON 48 46 73   IRONSAT 34 28 43   * 134 127 "         Imaging:    Current Medications:    aspirin  81 mg Oral Daily     atorvastatin  40 mg Oral At Bedtime     azithromycin  250 mg Oral Daily     cefTRIAXone  2 g Intravenous Q24H     docusate sodium  200 mg Oral Daily     emollient   Topical Daily     fluticasone-salmeterol  1 puff Inhalation Q12H     gelatin absorbable  1 each Topical During Hemodialysis (from stock)     heparin ANTICOAGULANT  5,000 Units Subcutaneous Q12H     hydrocortisone sodium succinate PF  50 mg Intravenous Q12H     influenza Vac Split High-Dose  0.5 mL Intramuscular Prior to discharge     levothyroxine  175 mcg Oral QAM AC     multivitamin RENAL  1 capsule Oral Daily     senna-docusate  1 tablet Oral BID    Or     senna-docusate  2 tablet Oral BID     sodium chloride (PF)  3 mL Intracatheter Q8H     sodium chloride (PF)  9 mL Intracatheter During Hemodialysis (from stock)     sodium chloride (PF)  9 mL Intracatheter During Hemodialysis (from stock)     umeclidinium  1 puff Inhalation Daily     vitamin D3  2,000 Units Oral Daily       - MEDICATION INSTRUCTIONS -       Mita Ellsworth, NP

## 2020-01-30 NOTE — PLAN OF CARE
"/59 (BP Location: Left arm)   Pulse 57   Temp 98.1  F (36.7  C) (Oral)   Resp 16   Ht 1.727 m (5' 8\")   Wt 61.2 kg (134 lb 14.7 oz)   SpO2 96%   BMI 20.51 kg/m      *Pt off unit from 6676-5079 at dialysis*    Neuro: A&Ox4. Forgetful  Cardiac: SB-SR. VSS. Afebrile.    Respiratory: Sating >94% on RA.  GI/: Minimal urine output. No BM this shift  Diet/appetite: Tolerating reg diet. Eating well. ACHS BG  Activity:  Assist of SB, up to chair and in halls.  Pain: PRN Tylenol and Oxy given x1  Skin: No new deficits noted.  LDA's: R HD CVC, R AV fistula, L PIV TKO    Plan: Continue with POC. Notify primary team with changes.    "

## 2020-01-31 LAB
ANION GAP SERPL CALCULATED.3IONS-SCNC: 7 MMOL/L (ref 3–14)
BACTERIA SPEC CULT: NO GROWTH
BASOPHILS # BLD AUTO: 0.1 10E9/L (ref 0–0.2)
BASOPHILS NFR BLD AUTO: 0.9 %
BUN SERPL-MCNC: 33 MG/DL (ref 7–30)
CALCIUM SERPL-MCNC: 8.3 MG/DL (ref 8.5–10.1)
CHLORIDE SERPL-SCNC: 100 MMOL/L (ref 94–109)
CO2 SERPL-SCNC: 26 MMOL/L (ref 20–32)
CREAT SERPL-MCNC: 3.16 MG/DL (ref 0.52–1.04)
DIFFERENTIAL METHOD BLD: ABNORMAL
EOSINOPHIL # BLD AUTO: 0 10E9/L (ref 0–0.7)
EOSINOPHIL NFR BLD AUTO: 0.4 %
ERYTHROCYTE [DISTWIDTH] IN BLOOD BY AUTOMATED COUNT: 15.9 % (ref 10–15)
GFR SERPL CREATININE-BSD FRML MDRD: 14 ML/MIN/{1.73_M2}
GLUCOSE BLDC GLUCOMTR-MCNC: 137 MG/DL (ref 70–99)
GLUCOSE BLDC GLUCOMTR-MCNC: 164 MG/DL (ref 70–99)
GLUCOSE SERPL-MCNC: 120 MG/DL (ref 70–99)
HCT VFR BLD AUTO: 33.5 % (ref 35–47)
HGB BLD-MCNC: 10.3 G/DL (ref 11.7–15.7)
IMM GRANULOCYTES # BLD: 0.1 10E9/L (ref 0–0.4)
IMM GRANULOCYTES NFR BLD: 0.9 %
LYMPHOCYTES # BLD AUTO: 0.9 10E9/L (ref 0.8–5.3)
LYMPHOCYTES NFR BLD AUTO: 16.1 %
Lab: NORMAL
MCH RBC QN AUTO: 31.2 PG (ref 26.5–33)
MCHC RBC AUTO-ENTMCNC: 30.7 G/DL (ref 31.5–36.5)
MCV RBC AUTO: 102 FL (ref 78–100)
MONOCYTES # BLD AUTO: 0.3 10E9/L (ref 0–1.3)
MONOCYTES NFR BLD AUTO: 5.8 %
NEUTROPHILS # BLD AUTO: 4.2 10E9/L (ref 1.6–8.3)
NEUTROPHILS NFR BLD AUTO: 75.9 %
NRBC # BLD AUTO: 0 10*3/UL
NRBC BLD AUTO-RTO: 0 /100
PLATELET # BLD AUTO: 216 10E9/L (ref 150–450)
POTASSIUM SERPL-SCNC: 4.7 MMOL/L (ref 3.4–5.3)
RBC # BLD AUTO: 3.3 10E12/L (ref 3.8–5.2)
SODIUM SERPL-SCNC: 134 MMOL/L (ref 133–144)
SPECIMEN SOURCE: NORMAL
WBC # BLD AUTO: 5.5 10E9/L (ref 4–11)

## 2020-01-31 PROCEDURE — 40000141 ZZH STATISTIC PERIPHERAL IV START W/O US GUIDANCE

## 2020-01-31 PROCEDURE — 25000128 H RX IP 250 OP 636: Performed by: NURSE PRACTITIONER

## 2020-01-31 PROCEDURE — 25000132 ZZH RX MED GY IP 250 OP 250 PS 637: Performed by: STUDENT IN AN ORGANIZED HEALTH CARE EDUCATION/TRAINING PROGRAM

## 2020-01-31 PROCEDURE — 36415 COLL VENOUS BLD VENIPUNCTURE: CPT | Performed by: INTERNAL MEDICINE

## 2020-01-31 PROCEDURE — 85049 AUTOMATED PLATELET COUNT: CPT

## 2020-01-31 PROCEDURE — 25000132 ZZH RX MED GY IP 250 OP 250 PS 637: Performed by: NURSE PRACTITIONER

## 2020-01-31 PROCEDURE — 99233 SBSQ HOSP IP/OBS HIGH 50: CPT | Performed by: INTERNAL MEDICINE

## 2020-01-31 PROCEDURE — 80048 BASIC METABOLIC PNL TOTAL CA: CPT | Performed by: INTERNAL MEDICINE

## 2020-01-31 PROCEDURE — 85025 COMPLETE CBC W/AUTO DIFF WBC: CPT | Performed by: INTERNAL MEDICINE

## 2020-01-31 PROCEDURE — 99207 ZZC CDG-CUT & PASTE-POTENTIAL IMPACT ON LEVEL: CPT | Performed by: INTERNAL MEDICINE

## 2020-01-31 PROCEDURE — 12000004 ZZH R&B IMCU UMMC

## 2020-01-31 PROCEDURE — 00000146 ZZHCL STATISTIC GLUCOSE BY METER IP

## 2020-01-31 PROCEDURE — 25000128 H RX IP 250 OP 636: Performed by: STUDENT IN AN ORGANIZED HEALTH CARE EDUCATION/TRAINING PROGRAM

## 2020-01-31 RX ORDER — BISACODYL 10 MG
10 SUPPOSITORY, RECTAL RECTAL DAILY PRN
Status: DISCONTINUED | OUTPATIENT
Start: 2020-01-31 | End: 2020-02-01 | Stop reason: HOSPADM

## 2020-01-31 RX ADMIN — Medication 1 MG: at 01:27

## 2020-01-31 RX ADMIN — FLUTICASONE PROPIONATE AND SALMETEROL 1 PUFF: 50; 250 POWDER RESPIRATORY (INHALATION) at 08:03

## 2020-01-31 RX ADMIN — Medication: at 08:04

## 2020-01-31 RX ADMIN — ATORVASTATIN CALCIUM 40 MG: 40 TABLET, FILM COATED ORAL at 19:55

## 2020-01-31 RX ADMIN — BISACODYL 10 MG: 10 SUPPOSITORY RECTAL at 22:43

## 2020-01-31 RX ADMIN — LEVOTHYROXINE SODIUM 175 MCG: 25 TABLET ORAL at 08:03

## 2020-01-31 RX ADMIN — OXYCODONE HYDROCHLORIDE 10 MG: 5 TABLET ORAL at 23:52

## 2020-01-31 RX ADMIN — AZITHROMYCIN MONOHYDRATE 250 MG: 250 TABLET ORAL at 08:04

## 2020-01-31 RX ADMIN — OXYCODONE HYDROCHLORIDE 10 MG: 5 TABLET ORAL at 19:55

## 2020-01-31 RX ADMIN — ACETAMINOPHEN 650 MG: 325 TABLET, FILM COATED ORAL at 23:52

## 2020-01-31 RX ADMIN — HYDROCORTISONE SODIUM SUCCINATE 50 MG: 100 INJECTION, POWDER, FOR SOLUTION INTRAMUSCULAR; INTRAVENOUS at 08:03

## 2020-01-31 RX ADMIN — ACETAMINOPHEN 650 MG: 325 TABLET, FILM COATED ORAL at 19:55

## 2020-01-31 RX ADMIN — ASPIRIN 81 MG CHEWABLE TABLET 81 MG: 81 TABLET CHEWABLE at 08:04

## 2020-01-31 RX ADMIN — UMECLIDINIUM 1 PUFF: 62.5 AEROSOL, POWDER ORAL at 08:03

## 2020-01-31 RX ADMIN — MELATONIN 2000 UNITS: at 08:04

## 2020-01-31 RX ADMIN — OXYCODONE HYDROCHLORIDE 10 MG: 5 TABLET ORAL at 02:52

## 2020-01-31 RX ADMIN — SENNOSIDES AND DOCUSATE SODIUM 1 TABLET: 8.6; 5 TABLET ORAL at 08:04

## 2020-01-31 RX ADMIN — ACETAMINOPHEN 650 MG: 325 TABLET, FILM COATED ORAL at 15:35

## 2020-01-31 RX ADMIN — Medication 5000 UNITS: at 22:24

## 2020-01-31 RX ADMIN — ACETAMINOPHEN 650 MG: 325 TABLET, FILM COATED ORAL at 10:01

## 2020-01-31 RX ADMIN — OXYCODONE HYDROCHLORIDE 10 MG: 5 TABLET ORAL at 15:35

## 2020-01-31 RX ADMIN — Medication 1 CAPSULE: at 11:52

## 2020-01-31 RX ADMIN — DOCUSATE SODIUM 200 MG: 100 CAPSULE, LIQUID FILLED ORAL at 08:04

## 2020-01-31 RX ADMIN — SENNOSIDES AND DOCUSATE SODIUM 2 TABLET: 8.6; 5 TABLET ORAL at 19:55

## 2020-01-31 RX ADMIN — ACETAMINOPHEN 650 MG: 325 TABLET, FILM COATED ORAL at 05:25

## 2020-01-31 RX ADMIN — ACETAMINOPHEN 650 MG: 325 TABLET, FILM COATED ORAL at 01:24

## 2020-01-31 RX ADMIN — OXYCODONE HYDROCHLORIDE 10 MG: 5 TABLET ORAL at 08:04

## 2020-01-31 RX ADMIN — Medication 5000 UNITS: at 10:01

## 2020-01-31 RX ADMIN — CEFTRIAXONE 2 G: 2 INJECTION, POWDER, FOR SOLUTION INTRAMUSCULAR; INTRAVENOUS at 10:03

## 2020-01-31 RX ADMIN — FLUTICASONE PROPIONATE AND SALMETEROL 1 PUFF: 50; 250 POWDER RESPIRATORY (INHALATION) at 19:56

## 2020-01-31 RX ADMIN — POLYETHYLENE GLYCOL 3350 17 G: 17 POWDER, FOR SOLUTION ORAL at 10:01

## 2020-01-31 ASSESSMENT — PAIN DESCRIPTION - DESCRIPTORS
DESCRIPTORS: ACHING
DESCRIPTORS: ACHING;CONSTANT;BURNING
DESCRIPTORS: ACHING;BURNING
DESCRIPTORS: ACHING

## 2020-01-31 ASSESSMENT — ACTIVITIES OF DAILY LIVING (ADL)
ADLS_ACUITY_SCORE: 18

## 2020-01-31 ASSESSMENT — MIFFLIN-ST. JEOR: SCORE: 1149.5

## 2020-01-31 NOTE — PROGRESS NOTES
Social Work: Assessment with Discharge Plan    Patient Name:  Kim Anne  :  1945  Age:  74 year old  MRN:  [id number]  Risk/Complexity Score:  Filed Complexity Screen Score: 12  Completed assessment with:  Patient, Chart Review    Presenting Information   Reason for Referral:  Discharge plan  Date of Intake:  2020  Referral Source:  RNCC (Malika)  Decision Maker:  Patient  Alternate Decision Maker:  Per HCD - primary HCA is son Ki Jordan and alternative HCA is daughter Kenia Jordan.   Health Care Directive:  Copy in Chart  Living Situation:  House with son  Previous Functional Status:  See PT/OT jose  Patient and family understanding of hospitalization:  Not discussed  Cultural/Language/Spiritual Considerations:  Pt is   Adjustment to Illness:  Pt is concerned about discharging to home and would prefer to go to TCU. She was just recently at [facility] [place] in December and would like to return there.     Physical Health  Reason for Admission:    1. Other hypervolemia    2. Hyperkalemia    3. Acute hypotension    4. End stage renal failure on dialysis (H)    5. 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 Type 2 diabetes mellitus with chronic kidney disease, without long-term current use of insulin, unspecified CKD stage (H)    7. End stage renal disease (H)    8. Encounter for extracorporeal dialysis (H)    9. Cigarette smoker      Services Needed/Recommended:  Home with 24/ assist    Mental Health/Chemical Dependency  Diagnosis:  None  Support/Services in Place:  n/a  Services Needed/Recommended:  n/a    Support System  Significant relationship at present time:  Pt lives with her son Ki who she reports is ill.   Family of origin is available for support:  Limited  Other support available:  Limited  Gaps in support system:  Yes  Patient is caregiver to:  None     Provider Information    Primary Care Physician:  Ailyn Nieves   [phone]   Clinic:  [address] [place] [id number]      :  None identified    Financial   Income Source:  Did not discuss  Financial Concerns:  None identified  Insurance:    Payor/Plan Subscriber Name Rel Member # Group #   UCARE - UCARE-SENIORS* JONEL CHAVEZ*  [id number] [facility]      [address]       Discharge Plan   Patient and family discharge goal:  TCU  Provided education on discharge plan:  YES  Patient agreeable to discharge plan:  YES  A list of Medicare Certified Facilities was provided to the patient and/or family to encourage patient choice. Patient's choices for facility are:    Pt/family was given the Medicare Compare list for SNF, with associated star ratings to assist with choice for referrals/discharge planning Yes    Education was given to pt/family that star ratings are updated/maintained by Medicare and can be reviewed by visiting www.medicare.gov Yes    1) [facility] at [place]  Pt has been here before and would like to return again.   Will NH provide Skilled rehabilitation or complex medical:  YES  General information regarding anticipated insurance coverage and possible out of pocket cost was discussed. Patient and patient's family are aware patient may incur the cost of transportation to the facility, pending insurance payment: YES  Barriers to discharge:  Placement    Discharge Recommendations   Anticipated Disposition:  Facility:  TCU (TBD)     Dialysis: [facility] - T/TH/S  5675-3949 chair time; typically arrives around 0630  [address] [place] [address]  Ph: [phone], F: [phone]  Transportation Needs:  Medical:  Wheelchair  Name of Transportation Company and Phone:  [employer] (Ph: [phone])    Additional comments   SW met with Pt at bedside to introduce self, SW role and discuss discharge planning. Pt expresses concern with return home and requests TCU  placement. Pt was recently at [facility] and would like to return there at discharge.     VELASQUEZ initiated referral to [facility]ks[facility] Mejia (No) today; No confirms that they have a bed open and she agreed to review referral. VELASQUEZ faxed to No this AM; SW awaiting update if Pt is accepted for admission.     RNCC confirmed with [facility] that Pt will resume OP HD there at discharge pending placement today. Ollie at [facility] confirmed that Pt can resume tomorrow.     SW to continue to follow and assist with discharge plan.    ANA You, LGSW  6B Intermediate Care Unit   [facility]  Phone: [phone]  Pager: [phone]

## 2020-01-31 NOTE — PROGRESS NOTES
Care Coordinator Progress Note    Admission Date/Time:  1/25/2020  Attending MD:  Cristina Esquivel MD    Data  Chart reviewed, discussed with interdisciplinary team.   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    Concerns with insurance coverage for discharge needs: None identified  Current Living Situation: Patient lives with Son  Support System: Limited support  Services Involved: Dialysis T TH Sat  [facility] on ULTRA Testing   Transportation at Discharge:   Transportation to Medical Appointments: [facility]  Barriers to Discharge: TCU availibility       Assessment  Per Medicine team, Pt is medically stable for discharge. Pt has been working with Therapy, home with assist is recommended. I have met with Pt this am to assist with discharge planning to home. Pt voiced concern about going home stating her Son is ill and she needs to go to a care center for more support. Pt is very vague, has difficulty describing her needs.  Pt has had poor compliance with her medical plan.  SW will assist with TCU options.    Addendum  Pt is now declining TCU. Pt is agreeable to HC follow up.  I have made a referral to HC for intermittent skilled nursing visits.  I have left a message for Pts [facility] Care Coordinator: Nani Carrion updated #[phone].  Per notes in Epic plan was to have a care conference with Pts Dialysis Pratibha ENG to increase services including PCA.     Plan  Anticipated Discharge Date: Today or 2/1  Anticipated Discharge Plan:  TCU    Malika Stinson RN   6B care coordinator #[phone]

## 2020-01-31 NOTE — PROGRESS NOTES
"                              Internal Medicine Progress Note - Gold Service  Kim Anne MRN: [id number]  Age: 74 year old, : 1945  Date:20         Interval History:       Patient seen and examined at bedside, she had HD this morning.   Offers no other complaints.     Denies any chest pain, no shortness of breath, no nausea, no vomiting, Complaining of pain and requesting pain medications  Being treated for Pneumonia    on contact isolation for possible reactivation TB, 3 Sputum samples now negative    Last 24 hr care team notes reviewed.     ROS:  4 point ROS including Respiratory, CV, GI and , is negative      PHYSICAL EXAM    Vital signs:  Temp: 97.8  F (36.6  C) Temp src: Oral BP: 117/74 Pulse: 57 Heart Rate: 60 Resp: 18 SpO2: 96 % O2 Device: None (Room air) Oxygen Delivery: 2 LPM Height: 172.7 cm (5' 8\") Weight: 61.2 kg (134 lb 14.7 oz)  Estimated body mass index is 20.51 kg/m  as calculated from the following:    Height as of this encounter: 1.727 m (5' 8\").    Weight as of this encounter: 61.2 kg (134 lb 14.7 oz).      Intake/Output Summary (Last 24 hours) at 2020 1628  Last data filed at 2020 1200  Gross per 24 hour   Intake 830 ml   Output 1786 ml   Net -956 ml       GEN: NAD, AAox3  HEENT: NC/AT , well injected conjunctiva, anicteric sclera, EOMI, PERRL, MMM  Neck: trachea midline, no lymphadenopathy, JVD  CV: RRR, nl S1/S2 no mumurs  PULM: CTAB, symmetric chest rise  Abdomen: soft, non tender/distented , no HSM, no massess,  BS +   EXTREMITIES: warm, no pedal edema, peripheral pulses intact  SKIN: No rashes, sores or ulcerations  NEURO: MS: AAOx3 -  CN II-XII grossly intact, Strength no motor-sensory deficits noted    DIAGNOSTIC STUDIES  I reviewed all medications, laboratory results, and imaging over the past 24 hours. Notable for;   Results for orders placed or performed during the hospital encounter of 20 (from the past 24 hour(s))   Glucose by meter   Result " Value Ref Range    Glucose 91 70 - 99 mg/dL   Glucose by meter   Result Value Ref Range    Glucose 160 (H) 70 - 99 mg/dL   Basic metabolic panel   Result Value Ref Range    Sodium 135 133 - 144 mmol/L    Potassium 5.0 3.4 - 5.3 mmol/L    Chloride 101 94 - 109 mmol/L    Carbon Dioxide 25 20 - 32 mmol/L    Anion Gap 9 3 - 14 mmol/L    Glucose 142 (H) 70 - 99 mg/dL    Urea Nitrogen 45 (H) 7 - 30 mg/dL    Creatinine 3.81 (H) 0.52 - 1.04 mg/dL    GFR Estimate 11 (L) >60 mL/min/[1.73_m2]    GFR Estimate If Black 13 (L) >60 mL/min/[1.73_m2]    Calcium 8.6 8.5 - 10.1 mg/dL   CBC with platelets differential   Result Value Ref Range    WBC 7.1 4.0 - 11.0 10e9/L    RBC Count 3.18 (L) 3.8 - 5.2 10e12/L    Hemoglobin 10.1 (L) 11.7 - 15.7 g/dL    Hematocrit 31.8 (L) 35.0 - 47.0 %     78 - 100 fl    MCH 31.8 26.5 - 33.0 pg    MCHC 31.8 31.5 - 36.5 g/dL    RDW 15.8 (H) 10.0 - 15.0 %    Platelet Count 191 150 - 450 10e9/L    Diff Method Automated Method     % Neutrophils 84.6 %    % Lymphocytes 11.3 %    % Monocytes 2.8 %    % Eosinophils 0.3 %    % Basophils 0.6 %    % Immature Granulocytes 0.4 %    Nucleated RBCs 0 0 /100    Absolute Neutrophil 6.0 1.6 - 8.3 10e9/L    Absolute Lymphocytes 0.8 0.8 - 5.3 10e9/L    Absolute Monocytes 0.2 0.0 - 1.3 10e9/L    Absolute Eosinophils 0.0 0.0 - 0.7 10e9/L    Absolute Basophils 0.0 0.0 - 0.2 10e9/L    Abs Immature Granulocytes 0.0 0 - 0.4 10e9/L    Absolute Nucleated RBC 0.0    Glucose by meter   Result Value Ref Range    Glucose 121 (H) 70 - 99 mg/dL   Glucose by meter   Result Value Ref Range    Glucose 156 (H) 70 - 99 mg/dL     *Note: Due to a large number of results and/or encounters for the requested time period, some results have not been displayed. A complete set of results can be found in Results Review.       MICROBIOLOGY  No growth       Assessment and Plan:     Date of admission: 1/25/2020  Kim Anne is a 74 year old female admitted on 1/25/2020. She was sent  from HD for low BP during her session. She also missed HD on Thursday 1/23/2020. Patient is not a good historian and family member ( Son Ki Jordan) does  not know much as well.   She was found to have hyperkalemia in the ER and Her BP has been low to undergo dialysis. She was admitted to ICU, received IV synthroid, CRRT and antibiotics   Her BP has improved she was noted to be struggling to breath and nephrology had to dialyse for relief    Plan for today  - remains stable off pressors. Cont HD per nephro  -cont antibiotics  - three sputum negative, discontinue isolation  -discontinue planning    #Hypotension cause unknown< multifactorial   Ddx: unknown thought to be secondary to hypovolemia vs hypothyroidism less likely sepsis given clinical picture   -s/p 750 ml fluid without adequate response  -Bedside ECHO  EF 60-65%  -Not currently on midodrine at home prior to HD   -Giveen 25G of albumin 25% and 25G 1/27/2020   -off pressors now  - will need home midodrine started soon    #Concern for potential sepsis   -A.Febrile, no leukocytosis, procal 0.17   -Unclear reason specially for hypotension as not super suspicious of sepsis   Workup: Flu -/MRSA -,  blood, sputum cx, and RVP neg to date  - Random Cortisol 11,    Antimicrobials/Antivirals:  Zosyn (1/25) and Vanc 1/25, now on ceftria/azithro.     #ESRD (CKD 4) on HD (T, Th, Sat)    Per pt, she missed her Thursday session on 1/23/19   -during dialysis session 1/25, became severely hypotensive run was stopped   -R tunneled HD line w/ old left side fistula   -Cont CRRT w/ plan to transitional to HD tomorrow   -She looks in distress using accessory muscles with hypertension. Given she did not have fluid dialysis she might be fluid overloaded. Nephrology to do ultrafiltration 1/27 and HD 1/28  -Nephro consulted     #COPD hx  -Cont Spiriva   -improving, most likely hypoventilation due to lethargy thought to be c/w hypothyroidism on top of hx of COPD.     -No  current issues, currently on room air   -Supplemental oxygen to keep saturation above 92 %.  - Incentive spirometer every 15- 30 minutes when awake.     # History of Tuberculosis   - known TB infection in the 70s for which she was treated. Per - patient needs three negative sputum cultures to r/o active TB , three sputums negatice- discontinue isolation     #Hx of MI  #CAD   Pt had an MI on 5/18/11, current EKG benign with normal sinus rhythm and QTc 454.   -trop negative at 0.05.   -Continue PTA 81 mg ASA      #Hyperkalemia: resolved    -Unknown specific reason, attempted HD 1/26 however stopped due to hypotension.   -attempted to be shifted multiple times in the ED however was refractory   -Per renal, 2K baths, and increased Dialysate rate      #Hypothyroidism  Per chart review, her thyroid function has not been normal.   Per history she is non-compliance with her medication for about 2 weeks    -TSH 97.20, free T4 0.23 on 1/25.  -No s/s of  myxedema coma at this time   -discontinue PO and Start IV levothyroxine 150 mg daily per endo recs   - Checking total T3 and free T4 tomorrow     #DM2   No PTA meds on med list. Current blood glucose stable at 115.   -on sliding scale   -on hypoglycemia protocol      Diet-Renal diet   Code: full Code  DVT PPX: Heparin     Cristina Esquivel MD   Internal Medicine Hospitalist   [facility]  Pager: [phone]    Team: Lorena Hooper   Page Cross Cover between 5pm-8am: pager [phone] (Rufus), if no response then page job code [phone].

## 2020-01-31 NOTE — PLAN OF CARE
"/74 (BP Location: Left arm)   Pulse 57   Temp 97.8  F (36.6  C) (Oral)   Resp 18   Ht 1.727 m (5' 8\")   Wt 61.2 kg (134 lb 14.7 oz)   SpO2 96%   BMI 20.51 kg/m      Shift: 9035-8050   Reason for Admission: Hyperkalemia and hypotension   VS: VSS on RA- Sinus matthew  Neuros: A/Ox4, able to make needs known  GI/: No BMs this shift, oliguric- pt on HD  Diet: Tolerating regular diet. BG ACHS  Drains/Lines: R. CVC SL, PIV TKO, R. Fistula   Activity: SBA + walker  Pain/Nausea: Denies both  Respiratory: Sats >95% on RA  Plan of care: Will continue to monitor and follow POC.   "

## 2020-01-31 NOTE — PROGRESS NOTES
"Social Work Services Progress Note    Hospital Day: 6  Date of Initial Social Work Evaluation:  1/31/2020 - please see for details  Collaborated with:  Patient, Aleksandr Roberto (No), Chart Review    Data:  SW following for TCU placement at discharge. Pt is medically stable pending placement.     SW received update from No Huitron, that Pt is accepted at [facility] for placement pending insurance authorization which she has submitted for.    SW received update this afternoon that they did receive authorization from insurance, however, now the building has some concern about accepting Pt r/t previous behavior at the facility. No is trying to work through this barrier with the staff. No will keep SW updated if we can move forward with this discharge plan.    SW received update at 1530 that unfortunately Aleksandr Coker has globally denied pt for admission r/t hx of smoking behavior at the TCU.    SW met with Pt to update her on the denials. Pt was very surprised and claims she has not smoked \"in years.\" SW called [facility] and requested it be double checked that it is indeed this Pt with the smoking behavior history; No will check into this.     VELASQUEZ met with Pt again at 1545 to continue discharge planning discussion. Pt's granddaughter Manisha was present. Pt is now stating she wants to discharge to home tomorrow morning. Her cousin Angelina is going to providing 24/7 assist at home. SW attempted to discuss Pt's concerns from this morning; Pt became quite agitated and told SW she had never said she had concerns about returning home and that her plan is to discharge home. She does not want to go to a nursing home. Pt is no longer agreeable to TCU placement. Pt is agreeable to [facility]. SW will update RNCC.     Intervention:    -Discharge planning    Assessment:      Plan:    Anticipated Disposition:  Pt plans to discharge home with family assist. Is agreeable to [facility].     " Barriers to d/c plan:  Medical stability    Follow Up:  No SW f/u indicated for discharge planning r/t Pt's refusal for TCU.    ANA You, LGSW  6B Intermediate Care Unit   [facility]  Phone: [phone]  Pager: [phone]

## 2020-01-31 NOTE — PROGRESS NOTES
[facility], [facility] Progress Note - Hospitalist Service, Gold 9       Date of Admission:  1/25/2020  Assessment & Plan    Kim Anne is a 74 year old female admitted on 1/25/2020. She was sent from HD for low BP during her session. She also missed HD on Thursday 1/23/2020. Patient is not a good historian and family member ( Son Ki Jordan) does  not know much as well.   She was found to have hyperkalemia in the ER and Her BP has been low to undergo dialysis. She was admitted to ICU, received IV synthroid, CRRT and antibiotics   Her BP has improved she was noted to be struggling to breath and nephrology had to dialyse for relief     Plan for today  Cont antibiotics  Discharge planning     #Hypotension cause unknown< multifactorial   Ddx: unknown thought to be secondary to hypovolemia vs hypothyroidism less likely sepsis given clinical picture   -s/p 750 ml fluid without adequate response  -Bedside ECHO  EF 60-65%  -Not currently on midodrine at home prior to HD   -Given 25G of albumin 25% and 25G 1/27/2020   -off pressors now  - will need home midodrine started soon     #Concern for potential sepsis   -A.Febrile, no leukocytosis, procal 0.17   -Unclear reason specially for hypotension as not super suspicious of sepsis   Workup: Flu -/MRSA -,  blood, sputum cx, and RVP neg to date  - Random Cortisol 11,    Antimicrobials/Antivirals:  Zosyn (1/25) and Vanc 1/25, now on ceftria/azithro. day 7 of effective therapy     #ESRD (CKD 4) on HD (T, Th, Sat)    Per pt, she missed her Thursday session on 1/23/19   -during dialysis session 1/25, became severely hypotensive run was stopped   -R tunneled HD line w/ old left side fistula   -Cont CRRT w/ plan to transitional to HD tomorrow   -She looks in distress using accessory muscles with hypertension. Given she did not have fluid dialysis she might be fluid overloaded. Nephrology to do ultrafiltration 1/27 and HD  1/28  -Nephro consulted      #COPD hx  -Cont Spiriva   -improving, most likely hypoventilation due to lethargy thought to be c/w hypothyroidism on top of hx of COPD.     -No current issues, currently on room air   -Supplemental oxygen to keep saturation above 92 %.  - Incentive spirometer every 15- 30 minutes when awake.     # History of Tuberculosis   - known TB infection in the 70s for which she was treated. Per - patient needs three negative sputum cultures to r/o active TB , three sputums negative- discontinue isolation     #Hx of MI  #CAD   Pt had an MI on 5/18/11, current EKG benign with normal sinus rhythm and QTc 454.   -trop negative at 0.05.   -Continue PTA 81 mg ASA       #Hyperkalemia: resolved    -Unknown specific reason, attempted HD 1/26 however stopped due to hypotension.   -attempted to be shifted multiple times in the ED however was refractory        #Hypothyroidism  Per chart review, her thyroid function has not been normal.   Per history she is non-compliance with her medication for about 2 weeks    -TSH 97.20, free T4 0.23 on 1/25.  -No s/s of  myxedema coma at this time   -discontinue PO and Start IV levothyroxine 150 mg daily per endo recs now changed to 175mcg daily     #DM2   No PTA meds on med list. Current blood glucose stable at 115.   -on sliding scale   -on hypoglycemia protocol     Diet: Regular Diet Adult    DVT Prophylaxis: Heparin SQ  Chaney Catheter: not present  Code Status: Full Code      Disposition Plan   Expected discharge: Tomorrow, recommended to prior living arrangement once patient agreeable.  Entered: Cristina Esquivel MD 01/31/2020, 5:18 PM       The patient's care was discussed with the Bedside Nurse, Care Coordinator/ and Patient.    Cristina Esquivel MD  Hospitalist Service, [address]  Pager: [phone]  Please see sticky note for cross cover  information  ______________________________________________________________________    Interval History   Patient was seen and examined, No acute events overnight. She denies any chest pain, no shortness of breath, no nausea, no vomiting, tolerating PO. No abdominal complaints no urinary complaints    Data reviewed today: I reviewed all medications, new labs and imaging results over the last 24 hours. I personally reviewed no images or EKG's today.    Physical Exam   Vital Signs: Temp: 98.8  F (37.1  C) Temp src: Oral BP: 99/57   Heart Rate: 64 Resp: 16 SpO2: 97 % O2 Device: None (Room air)    Weight: 132 lbs 7.94 oz     Physical Exam  Constitutional:       General: She is not in acute distress.     Appearance: Normal appearance. She is normal weight. She is ill-appearing (chronically).   HENT:      Head: Normocephalic and atraumatic.      Nose: Nose normal.      Mouth/Throat:      Mouth: Mucous membranes are moist.      Comments: Poor dentition  Eyes:      Extraocular Movements: Extraocular movements intact.      Pupils: Pupils are equal, round, and reactive to light.   Neck:      Musculoskeletal: Normal range of motion.   Cardiovascular:      Rate and Rhythm: Normal rate and regular rhythm.      Pulses: Normal pulses.      Heart sounds: No murmur.   Pulmonary:      Effort: Pulmonary effort is normal. No respiratory distress.      Breath sounds: No wheezing or rhonchi.   Abdominal:      General: Abdomen is flat. Bowel sounds are normal.      Palpations: Abdomen is soft.   Musculoskeletal: Normal range of motion.   Skin:     General: Skin is warm and dry.      Coloration: Skin is not jaundiced.      Findings: No bruising.   Neurological:      General: No focal deficit present.      Mental Status: She is alert and oriented to person, place, and time.           Data   Recent Labs   Lab 01/31/20  0447 01/30/20  0543 01/29/20  0541  01/27/20  1750 01/27/20  1306  01/27/20  0420  01/26/20  1030  01/26/20  0719   01/25/20  1305   WBC 5.5 7.1 8.2  --  14.5*  --   --  8.6  --   --   --   --    < >  --    HGB 10.3* 10.1* 9.5*  --  11.4*  --   --  11.0*  --   --   --   --    < >  --    * 100 100  --  102*  --   --  101*  --   --   --   --    < >  --     191 163   < > 207  --   --  200  --   --   --   --    < >  --     135 135   < >  --   --    < > 137   < > 134  --  133   < >  --    POTASSIUM 4.7 5.0 4.1   < >  --   --    < > 4.3   < > 6.1*   < > 6.8*   < >  --    CHLORIDE 100 101 102   < >  --   --    < > 106   < > 107  --  106   < >  --    CO2 26 25 29   < >  --   --    < > 27   < > 26  --  24   < >  --    BUN 33* 45* 23   < >  --   --    < > 15   < > 34*  --  39*   < >  --    CR 3.16* 3.81* 2.17*   < >  --   --    < > 1.69*   < > 3.88*  --  4.32*   < >  --    ANIONGAP 7 9 4   < >  --   --    < > 4   < > 2*  --  4   < >  --    FRANCES 8.3* 8.6 8.2*   < >  --   --    < > 9.3   < > 8.7  --  7.2*   < >  --    * 142* 112*   < >  --   --    < > 134*   < > 126*  --  113*   < >  --    ALBUMIN  --   --   --   --   --   --   --  3.5  --   --   --  2.5*   < >  --    PROTTOTAL  --   --   --   --   --   --   --  6.9  --   --   --  5.9*   < >  --    BILITOTAL  --   --   --   --   --   --   --  0.3  --   --   --  0.4   < >  --    ALKPHOS  --   --   --   --   --   --   --  128  --   --   --  125   < >  --    ALT  --   --   --   --   --   --   --  51*  --   --   --  21   < >  --    AST  --   --   --   --   --   --   --  47*  --   --   --  32   < >  --    LIPASE  --   --   --   --   --   --   --   --   --  151  --   --   --   --    TROPI  --   --   --   --  0.045 0.037  --   --   --   --   --   --   --   --    TROPONIN  --   --   --   --   --   --   --   --   --   --   --   --   --  0.03    < > = values in this interval not displayed.     No results found for this or any previous visit (from the past 24 hour(s)).  Medications     - MEDICATION INSTRUCTIONS -         aspirin  81 mg Oral Daily     atorvastatin  40 mg Oral  At Bedtime     cefTRIAXone  2 g Intravenous Q24H     docusate sodium  200 mg Oral Daily     emollient   Topical Daily     fluticasone-salmeterol  1 puff Inhalation Q12H     heparin ANTICOAGULANT  5,000 Units Subcutaneous Q12H     influenza Vac Split High-Dose  0.5 mL Intramuscular Prior to discharge     levothyroxine  175 mcg Oral QAM AC     multivitamin RENAL  1 capsule Oral Daily     senna-docusate  1 tablet Oral BID    Or     senna-docusate  2 tablet Oral BID     sodium chloride (PF)  3 mL Intracatheter Q8H     umeclidinium  1 puff Inhalation Daily     vitamin D3  2,000 Units Oral Daily

## 2020-01-31 NOTE — PLAN OF CARE
Shift: 0700 - 1530  VS: Temp: 98.3  F (36.8  C) Temp src: Oral BP: 115/71   Heart Rate: 62 Resp: 16 SpO2: 97 % O2 Device: None (Room air)    Pain: Pain to Upper & Lower extremities r/t neuropathy at baseline. Numbness. Oxycodone 10mg given x1 this shift and her tylenol. Decreased pain reported.   Neuro: A&Ox4, forgetful. Memory deficits and expressive delays. Pleasant and cooperative with care.   Cardiac:   WDL. SR/SB.  Respiratory: Lung sounds are clear and equal bilaterally.   GI/Diet/Appetite: Regular diet with good appetite. LBM 1/27, gave senokot, colace and miralax today. :  Oliguric. On HD every TTS  LDA's: CVC to right internal jugular.   Skin: Bruised otherwise intact.   Activity: SBA   Tests/Procedures:   Pertinent Labs/Lab Collection:      Plan: Unsafe for patient to discharge home, exploring patient placement in TCU or long term care. Care coordinator and  involved, planning a care conference.

## 2020-01-31 NOTE — PROGRESS NOTES
Brief Endocrine Progress Note:    Chart reviewed    75yo F with h/o CAD, COPD, ESRD on HD, T2DM admitted with post-HD hypotension and found to have significantly abnormal thyroid labs in the setting of chronic hypothyroidism - poorly controlled due to medication non-adherence, patient reports due to insurance issues and lack of transportation to get her medications; therefore she doesn't take her medications    1. Hypothyroidism:    Free T4 continues to improve (0.62 1/29), with lagging increase in T3 due to suspected component of sick thyroid. TSH recovery will take weeks.     Recommendations:    - Continue oral synthroid 175mcg daily  - Check free T4, total T3, and TSH prior to discharge or in 1 week if still in hospital to ensure absorption is not an issue; please page Endocrine with results if free T4 is not wnl or pattern is unexpected (does not continue improving trend)  - Follow up with PCP and repeat labs 4 weeks post-discharge  - Endocrine will signoff considering TCU transfer today or tomorrow.    Discussed with Dr. Lepe.    Guru Sarika MD  PGY 4 - Endocrinology Fellow  Pager: [phone]  Call ** [phone] then enter job code [phone] for on call person.     I have reviewed  the fellow's note, and agree with the plan of care.  MADONNA Jonsa

## 2020-01-31 NOTE — PLAN OF CARE
Status: Patient admitted for hyperkalemia and hypotension.  VS: VSS on RA.  Neuros: A&Ox4.  Cardiovascular: SB/SR 50-60s.  GI/: Tolerating regular diet. Denies nausea. LBM 1/27. Oliguric d/t HD.   IV: R CVC for HD. L PIV SL.   Activity: Up w/ SBA. Bed alarm on.   Pain: Generalized/BLE pain controlled w/ PRN Oxycodone and Tylenol.   Respiratory/Trach: No issues.  Skin: R fistula.   Plan of Care: Airborne precautions discontinued. Continue to monitor and follow POC.

## 2020-01-31 NOTE — PROGRESS NOTES
Nephrology Chart review:     Patient may be discharging to TCU today and resuming OP HD tomorrow. If she does not discharge today will run here tomorrow prior to discharge.

## 2020-02-01 VITALS
TEMPERATURE: 97.8 F | HEIGHT: 68 IN | HEART RATE: 57 BPM | DIASTOLIC BLOOD PRESSURE: 62 MMHG | OXYGEN SATURATION: 95 % | RESPIRATION RATE: 12 BRPM | WEIGHT: 136.47 LBS | BODY MASS INDEX: 20.68 KG/M2 | SYSTOLIC BLOOD PRESSURE: 122 MMHG

## 2020-02-01 LAB
GLUCOSE BLDC GLUCOMTR-MCNC: 109 MG/DL (ref 70–99)
GLUCOSE BLDC GLUCOMTR-MCNC: 99 MG/DL (ref 70–99)

## 2020-02-01 PROCEDURE — 25000128 H RX IP 250 OP 636: Performed by: NURSE PRACTITIONER

## 2020-02-01 PROCEDURE — 25000132 ZZH RX MED GY IP 250 OP 250 PS 637: Performed by: STUDENT IN AN ORGANIZED HEALTH CARE EDUCATION/TRAINING PROGRAM

## 2020-02-01 PROCEDURE — 25000128 H RX IP 250 OP 636: Performed by: STUDENT IN AN ORGANIZED HEALTH CARE EDUCATION/TRAINING PROGRAM

## 2020-02-01 PROCEDURE — 25800030 ZZH RX IP 258 OP 636

## 2020-02-01 PROCEDURE — 25000132 ZZH RX MED GY IP 250 OP 250 PS 637: Performed by: NURSE PRACTITIONER

## 2020-02-01 PROCEDURE — 99239 HOSP IP/OBS DSCHRG MGMT >30: CPT | Performed by: INTERNAL MEDICINE

## 2020-02-01 PROCEDURE — 00000146 ZZHCL STATISTIC GLUCOSE BY METER IP

## 2020-02-01 PROCEDURE — 90937 HEMODIALYSIS REPEATED EVAL: CPT

## 2020-02-01 RX ORDER — DOCUSATE SODIUM 100 MG/1
200 CAPSULE, LIQUID FILLED ORAL DAILY
Qty: 20 CAPSULE | Refills: 0 | Status: ON HOLD | OUTPATIENT
Start: 2020-02-01 | End: 2020-02-27

## 2020-02-01 RX ORDER — LEVOTHYROXINE SODIUM 175 UG/1
175 TABLET ORAL
Qty: 30 TABLET | Refills: 0 | Status: ON HOLD | OUTPATIENT
Start: 2020-02-02 | End: 2020-02-28

## 2020-02-01 RX ORDER — POLYETHYLENE GLYCOL 3350 17 G/17G
17 POWDER, FOR SOLUTION ORAL DAILY PRN
Qty: 10 PACKET | Refills: 0 | Status: ON HOLD | OUTPATIENT
Start: 2020-02-01 | End: 2020-02-28

## 2020-02-01 RX ORDER — OXYCODONE HYDROCHLORIDE 5 MG/1
5 TABLET ORAL EVERY 8 HOURS PRN
Qty: 15 TABLET | Refills: 0 | Status: ON HOLD | OUTPATIENT
Start: 2020-02-01 | End: 2020-02-28

## 2020-02-01 RX ORDER — ATORVASTATIN CALCIUM 40 MG/1
40 TABLET, FILM COATED ORAL AT BEDTIME
Qty: 30 TABLET | Refills: 0 | Status: ON HOLD | OUTPATIENT
Start: 2020-02-01 | End: 2020-02-28

## 2020-02-01 RX ADMIN — Medication: at 09:36

## 2020-02-01 RX ADMIN — SODIUM CHLORIDE 300 ML: 9 INJECTION, SOLUTION INTRAVENOUS at 08:35

## 2020-02-01 RX ADMIN — FLUTICASONE PROPIONATE AND SALMETEROL 1 PUFF: 50; 250 POWDER RESPIRATORY (INHALATION) at 07:56

## 2020-02-01 RX ADMIN — Medication 1 CAPSULE: at 13:09

## 2020-02-01 RX ADMIN — OXYCODONE HYDROCHLORIDE 10 MG: 5 TABLET ORAL at 08:00

## 2020-02-01 RX ADMIN — OXYCODONE HYDROCHLORIDE 10 MG: 5 TABLET ORAL at 03:57

## 2020-02-01 RX ADMIN — OXYCODONE HYDROCHLORIDE 10 MG: 5 TABLET ORAL at 13:37

## 2020-02-01 RX ADMIN — ACETAMINOPHEN 650 MG: 325 TABLET, FILM COATED ORAL at 03:57

## 2020-02-01 RX ADMIN — ASPIRIN 81 MG CHEWABLE TABLET 81 MG: 81 TABLET CHEWABLE at 07:55

## 2020-02-01 RX ADMIN — DOCUSATE SODIUM 200 MG: 100 CAPSULE, LIQUID FILLED ORAL at 07:55

## 2020-02-01 RX ADMIN — SODIUM CHLORIDE 250 ML: 9 INJECTION, SOLUTION INTRAVENOUS at 08:35

## 2020-02-01 RX ADMIN — UMECLIDINIUM 1 PUFF: 62.5 AEROSOL, POWDER ORAL at 07:56

## 2020-02-01 RX ADMIN — Medication 5000 UNITS: at 13:09

## 2020-02-01 RX ADMIN — POLYETHYLENE GLYCOL 3350 17 G: 17 POWDER, FOR SOLUTION ORAL at 13:35

## 2020-02-01 RX ADMIN — SENNOSIDES AND DOCUSATE SODIUM 2 TABLET: 8.6; 5 TABLET ORAL at 07:59

## 2020-02-01 RX ADMIN — LEVOTHYROXINE SODIUM 175 MCG: 25 TABLET ORAL at 07:55

## 2020-02-01 RX ADMIN — CEFTRIAXONE 2 G: 2 INJECTION, POWDER, FOR SOLUTION INTRAMUSCULAR; INTRAVENOUS at 13:09

## 2020-02-01 RX ADMIN — Medication: at 07:56

## 2020-02-01 RX ADMIN — MELATONIN 2000 UNITS: at 07:55

## 2020-02-01 ASSESSMENT — ACTIVITIES OF DAILY LIVING (ADL)
ADLS_ACUITY_SCORE: 17
ADLS_ACUITY_SCORE: 18
ADLS_ACUITY_SCORE: 18
ADLS_ACUITY_SCORE: 17

## 2020-02-01 ASSESSMENT — MIFFLIN-ST. JEOR
SCORE: 1167.5
SCORE: 1176.5
SCORE: 1174.5

## 2020-02-01 ASSESSMENT — PAIN DESCRIPTION - DESCRIPTORS: DESCRIPTORS: ACHING;CONSTANT

## 2020-02-01 NOTE — PROGRESS NOTES
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     Assessment & Recommendations:     # ESRD on TTS iHD  Patient gets HD with Dr Liz at [facility], running for 3.0 hours still via her CVC with AVF created back in 11/2019. EDW 57.5 kg.    - Patient received CRRT 1/25 -1/27   - Continue TTS schedule    - Avoid venipuncture/blood pressures right arm   - AVF well developed. Surgery date was 11/22/13. Discussed with Dr Vega 1/28 and she was fine with us cannulating the AVF. We were able to cannulate w/o difficulty on 1/29 and 1/30 but patient moved her arm and infiltrated. Resumed use of TDC   - Resume trial of AVF in OP setting    # HTN / volume status - Improving volume status. No LE edema. Off supplemental oxygen. Standing weight today is 62.8 kg and she is not tolerating much fluid removal. Previous EDW 57.5 kg. Admission weight 63.5 kg.  Blood pressures today teens/ . Not on antihypertensives at baseline   - Please stand for weights   - UF as tolerated   - Increase EDW to 61.5 kg     # electrolytes - No labs today     # Acid base - No labs today    # anemia - Hgb 10.1    - On Mircera ( will hold here)   - Have completed Venofer course that was started in OP HD unit, will begin 50 mg q Tuesday on 2/4    # MBD-CKD - Ica 5.0, Phos 2.8, albumin 3.5   - Holding Calcitriol 0.5 mcg IV q run    Recommendations were communicated to primary team via progress note    Seen and discussed with Dr. Nadine Ellsworth, NP   [phone]    Interval History :   Nursing and provider notes from last 24 hours reviewed.  Using tunneled line for HD today  Tolerating HD w/o complication     Review of Systems:   I reviewed the following systems:  GI: Denies N/V/D  Neuro:  Alert,  "interactive  Constitutional:  No fever or chills  CV: Denies dyspnea, CP, edema.     Physical Exam:   I/O last 3 completed shifts:  In: 480 [P.O.:480]  Out: -    /59   Pulse 57   Temp 97.6  F (36.4  C) (Oral)   Resp 8   Ht 1.727 m (5' 8\")   Wt 62.8 kg (138 lb 7.2 oz)   SpO2 96%   BMI 21.05 kg/m       GENERAL APPEARANCE: Comfortable on HD  PULM: lungs CTA. Breathing is non labored. Off supplemental oxygen  CV: RRR       -edema - None   GI: soft, NT, Non distended.   INTEGUMENT: no cyanosis or rash  NEURO:  Alert/interactive  Access - Right TDC, Right AVF + bruit    Labs:   All labs reviewed by me  Electrolytes/Renal -   Recent Labs   Lab Test 01/31/20 0447 01/30/20  0543 01/29/20  0541 01/29/20  0003  01/27/20  0420  01/26/20  1612    135 135  --    < > 137   < > 135   POTASSIUM 4.7 5.0 4.1 4.2   < > 4.3   < > 5.3   CHLORIDE 100 101 102  --    < > 106   < > 106   CO2 26 25 29  --    < > 27   < > 25   BUN 33* 45* 23  --    < > 15   < > 24   CR 3.16* 3.81* 2.17*  --    < > 1.69*   < > 2.73*   * 142* 112*  --    < > 134*   < > 197*   FRANCES 8.3* 8.6 8.2*  --    < > 9.3   < > 8.9   MAG  --   --   --  1.6  --  1.9  --  2.0   PHOS  --   --   --  2.5  --  2.8  --  3.2    < > = values in this interval not displayed.       CBC -   Recent Labs   Lab Test 01/31/20 0447 01/30/20  0543 01/29/20  0541   WBC 5.5 7.1 8.2   HGB 10.3* 10.1* 9.5*    191 163       LFTs -   Recent Labs   Lab Test 01/27/20  0420 01/26/20  0719 01/26/20  0550   ALKPHOS 128 125 Canceled, Test credited   BILITOTAL 0.3 0.4 Canceled, Test credited   ALT 51* 21 Canceled, Test credited   AST 47* 32 Canceled, Test credited   PROTTOTAL 6.9 5.9* Canceled, Test credited   ALBUMIN 3.5 2.5* Canceled, Test credited       Iron Panel -   Recent Labs   Lab Test 05/01/19  1320 02/16/18  0945 09/11/17  0928   IRON 48 46 73   IRONSAT 34 28 43   * 134 127         Imaging:    Current Medications:    aspirin  81 mg Oral Daily     " atorvastatin  40 mg Oral At Bedtime     cefTRIAXone  2 g Intravenous Q24H     docusate sodium  200 mg Oral Daily     emollient   Topical Daily     fluticasone-salmeterol  1 puff Inhalation Q12H     gelatin absorbable  1 each Topical During Hemodialysis (from stock)     heparin ANTICOAGULANT  5,000 Units Subcutaneous Q12H     influenza Vac Split High-Dose  0.5 mL Intramuscular Prior to discharge     levothyroxine  175 mcg Oral QAM AC     multivitamin RENAL  1 capsule Oral Daily     senna-docusate  1 tablet Oral BID    Or     senna-docusate  2 tablet Oral BID     sodium chloride (PF)  3 mL Intracatheter Q8H     sodium chloride (PF)  9 mL Intracatheter During Hemodialysis (from stock)     sodium chloride (PF)  9 mL Intracatheter During Hemodialysis (from stock)     umeclidinium  1 puff Inhalation Daily     vitamin D3  2,000 Units Oral Daily       - MEDICATION INSTRUCTIONS -       Mita Ellsworth, NP

## 2020-02-01 NOTE — PROGRESS NOTES
Shift: 1530 - 1630  VS: Temp: 97.8  F (36.6  C) Temp src: Oral BP: 122/62   Heart Rate: 59 Resp: 12 SpO2: 95 % O2 Device: None (Room air)    Pain: Denies pain.   Neuro: A&Ox4, forgetful. Cooperative with care. Calls appropriately.   Cardiac:   Bradycardia.  Respiratory: Lung sounds clear to diminished on RA.   GI/Diet/Appetite: Regular diet w/good appetite.   :  Oliguric on HD.   LDA's: PIV to LUE removed prior to discharge. Patient discharged home with CVC for HD.   Skin: Intact.   Activity: SBA.   Tests/Procedures:   Pertinent Labs/Lab Collection:      Plan: Patient discharged today at ~1630. Discharge orders reviewed with patient, questions and concerns addressed. PIV removed, pressure dressing applied. CVC dressing CDI. Personal belongings from security and room sent with patient, including walker. Patient transferred to [facility] by  via . Family member was here to transfer patient home.

## 2020-02-01 NOTE — PLAN OF CARE
Neuro: A&Ox4. Forgetful at times.  Cardiac: SR. VSS.   Respiratory: Sating 97 % on RA.  GI/: HD pt, oliguric. No BM yet; MD aware.  Diet/appetite: Tolerating regular diet. Eating well.  Activity:  Assist of stand by assist up to chair and in halls.  Pain: PRN oxycodone given for pain.  Skin: No new deficits noted.  LDA's: PIV to L, HD line.    Plan: Discharging this evening. Continue with POC. Notify primary team with changes.

## 2020-02-01 NOTE — PLAN OF CARE
"/77 (BP Location: Left arm)   Pulse 57   Temp 98  F (36.7  C) (Oral)   Resp 16   Ht 1.727 m (5' 8\")   Wt 62.6 kg (138 lb 0.1 oz)   SpO2 96%   BMI 20.98 kg/m    Neuro: A&Ox4. Forgetful. Bed alarm maintained.  Cardiac: Afebrile, VSS with intermittent bradycardia. Telemetry: NSR/SBrady- no noted ectopy.   Respiratory: RA   GI/: Voiding spontaneously.Oliguric, on HD.  No BM this shift. Suppository given on previous shift. Prune juice admin. Pt reports feeling constipated. Declined suggestion of getting enema order. Discussed correlation of constipation to reg use of opioids.  Diet/appetite: Tolerating reg diet. Denies nausea   Activity: Up with stand by assist    Pain: Pain maintained at tolerable level with prn oxycodone and tylenol admin q4hrs.  Skin: No new deficits noted.  Lines: AVF R arm. R dbl lumen internal jugular for HD- dressing CDI. PIV x2 sl'd.   Replacement: awaiting results of am lab levels.   Rested btwn cares. Able to make needs known. Continue POC.    "

## 2020-02-01 NOTE — PLAN OF CARE
Shift: 2519-3692  VS: Temp: 98.8  F (37.1  C) Temp src: Oral BP: 99/57   Heart Rate: 64 Resp: 16 SpO2: 97 % O2 Device: None (Room air)    Pain: Pain to Upper & Lower extremities r/t neuropathy at baseline. Numbness. Oxycodone 10mg given x2 this shift and her tylenol. Decreased pain reported.   Neuro: A&Ox4, forgetful. Memory deficits and expressive delays. Pleasant and cooperative with care.   Cardiac:   WDL. SR/SB.  Respiratory: Lung sounds are clear and equal bilaterally.   GI/Diet/Appetite: Regular diet with good appetite. LBM 1/27, gave senokot, colace and miralax today. :  Oliguric. On HD every TTS  LDA's: CVC to right internal jugular. Placed order for new PIV at 1900   Skin: Bruised otherwise intact.   Activity: SBA   Tests/Procedures:   Pertinent Labs/Lab Collection:      Plan: Unsafe for patient to discharge home, exploring patient placement in TCU or long term care. Care coordinator and  involved, planning a care conference.

## 2020-02-01 NOTE — PROGRESS NOTES
Care Coordinator - Discharge Planning    Admission Date/Time:  1/25/2020  Attending MD:  Cristina Esquivel MD     Data  Date of initial CC assessment:  1/31/2020  Chart reviewed, discussed with interdisciplinary team.   Patient was admitted for:   1. Other hypervolemia    2. Hyperkalemia    3. Acute hypotension    4. End stage renal failure on dialysis (H)    5. 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 Type 2 diabetes mellitus with chronic kidney disease, without long-term current use of insulin, unspecified CKD stage (H)    7. End stage renal disease (H)    8. Encounter for extracorporeal dialysis (H)    9. Cigarette smoker    10. Hypotension of hemodialysis         Assessment   Full assessment completed in previous note    Coordination of Care and Referrals: Per MD, patient discharging home today after dialysis. Previous RNCC made referral to [facility] for services. Patient is on hold with [facility] on [facility] for dialysis T,Th,S.   - Writer notified [facility] of discharge.  - Writer notified [facility] [place] by phone. They are requesting discharge orders and questioning if AVF can be used for dialysis. Writer spoke with Mita Ellsworth; she stated she will send orders, including access orders.  - Writer left a voicemail for patient's [facility] Care Coordinator, Nani Carrion (phn [phone]) notifying her of discharge and plan.       Plan  Anticipated Discharge Date:  2/1/2020  Anticipated Discharge Plan:  Home with assist from family, home care services, resumption of dialysis and clinic follow up as recommended by the team    CTS Handoff completed:  YES    ALMA Robles (weekend coverage)

## 2020-02-01 NOTE — DISCHARGE SUMMARY
[facility], [place]  Hospitalist Discharge Summary       Date of Admission:  1/25/2020  Date of Discharge:  2/1/2020  Discharging Provider: Cristina Esquivel MD  Discharge Team: Hospitalist Service, Gold 9    Discharge Diagnoses   Resistant hyperkalemia  Hypovolemic hypotension sepsis  Sepsis  ESRD on HD  COPD  HX of CAD  Hypothyroidism   DM2      Follow-ups Needed After Discharge   Follow-up Appointments     Adult [facility] Follow-up and recommended labs and tests      Follow up with primary care provider, Ailyn Nieves, within 7   days for hospital follow- up.  The following labs/tests are recommended:   CBC, BMP.  Thyroid function test    You could follow up with endocrine clinic for your hypothyroidism    You will need Thyroid function checked in 4-6 weeks post discharge.    Appointments on [place] and/or [facility] (with [facility] or [facility]   provider or service). Call [phone] if you haven't heard regarding   these appointments within 7 days of discharge.             Unresulted Labs Ordered in the Past 30 Days of this Admission     Date and Time Order Name Status Description    1/27/2020 2200 AFB Culture Non Blood Preliminary     1/27/2020 0938 AFB Culture Non Blood Preliminary     1/25/2020 2016 AFB Culture Non Blood Preliminary     1/25/2020 0815 T4 free In process       These results will be followed up by PCP    Discharge Disposition   Discharged to home  Condition at discharge: Stable    Hospital Course   Kim Anne is a 74 year old female admitted on 1/25/2020. She was sent from HD for low BP during her session. She also missed HD on Thursday 1/23/2020. Patient is not a good historian and family member ( Son Ki Jordan) does  not know much as well.   She was found to have hyperkalemia in the ER and Her BP has been low to undergo dialysis. She was admitted to ICU, received IV synthroid, CRRT and antibiotics   Her BP has improved she was noted to be  struggling to breath and nephrology had to dialyse for relief     #Hypotension cause unknown< multifactorial   Ddx: unknown thought to be secondary to hypovolemia vs hypothyroidism less likely sepsis given clinical picture   She responded to 750 ml fluid IVF without adequate response. A Bedside ECHO  EF 60-65%. She was also given Albumin as well. Briefly required Pressors in the ICU. She underwent CRRT and was stablized. She was then transitioned to HD.     #Concern for potential sepsis    She  Remained afebrile, no leukocytosis, procal 0.17   -Unclear reason specially for hypotension as not super suspicious of sepsis   Workup included Flu -/MRSA -,  blood, sputum cx, and RVP neg to date. Random Cortisol 11, . She was initially started on Vancomycin and Zosyn which was transitioned to Ceftriazone/Azithriomycin to complete a 7 day course in the hospital.     #ESRD (CKD 4) on HD (T, Th, Sat)    Per pt, she missed her Thursday session on 1/23/19. During dialysis session 1/25, became severely hypotensive run was stopped. She had a R tunneled HD line w/ old left side fistula   She required CRRT that was transitioned to HD. On 1/27 She looked in distress using accessory muscles with hypertension. Given she did not have fluid dialysis she might be fluid overloaded. Nephrology to do ultrafiltration 1/27 and HD 1/28. Her last HD was 2/1/20 prior to discontinue. Nephrology to arrange for outpatient HD as prior     #COPD hx  She was continued on in jose for COPD   She had episode of hypoventilation,  most likely hypoventilation due to lethargy thought to be c/w hypothyroidism on top of hx of COPD. She was on room air at the time of discharge.  .     # History of Tuberculosis   She has a known TB infection in the 70s for which she was treated.  3 sputum's were sent which were preliminary negative for TB and her isolation was discontinued    #Hx of MI  #CAD   Pt had an MI on 5/18/11, current EKG benign with normal sinus  rhythm and QTc 454.   She was continued on her home dose of aspirin      #Hyperkalemia: resolved    Unknown specific reason, attempted HD 1/26 however stopped due to hypotension. Attempted to be shifted multiple times in the ED however was refractory.  This was resolved by the time of discharge.     #Hypothyroidism  Per chart review, her thyroid function has not been normal.   Per history she is non-compliance with her medication for about 2 weeks    However TSH 97.20, free T4 0.23 on 1/25.No s/s of  myxedema coma during this admission.  Endocrine was consulted and for a brief time they discontinue PO and Start IV levothyroxine 150 mg daily per endo recs now changed to 175mcg daily     #DM2   No PTA meds on med list. Current blood glucose stable      Discharge planning: She was counseled multiple times regarding the importance of medications compliance and HD compliance.  Barriers to compliance were explored multiple times by myself and social work.  Apparently her friend was identified as a person to help with medications and compliance.  Community social work also involved in her care.  She initially agreed to go to TCU but soon retracted and insisted on going home.  She does understand she has poor memory and needs help.  Home care was arranged    Diet: Regular Diet Adult    Code Status: Full Code        Consultations This Hospital Stay   NEPHROLOGY ESRD ADULT IP CONSULT  PHARMACY TO DOSE VANCO  PHARMACY CRRT IP CONSULT  PHYSICAL THERAPY ADULT IP CONSULT  OCCUPATIONAL THERAPY ADULT IP CONSULT  ENDOCRINE NON-DIABETES ADULT IP CONSULT  VASCULAR ACCESS CARE ADULT IP CONSULT  VASCULAR ACCESS CARE ADULT IP CONSULT  PHARMACY CRRT IP CONSULT  SPIRITUAL HEALTH SERVICES IP CONSULT  VASCULAR ACCESS CARE ADULT IP CONSULT  VASCULAR ACCESS CARE ADULT IP CONSULT  VASCULAR ACCESS CARE ADULT IP CONSULT  VASCULAR ACCESS CARE ADULT IP CONSULT  VASCULAR ACCESS CARE ADULT IP CONSULT  VASCULAR ACCESS CARE ADULT IP CONSULT    Code  Status   Full Code    Time Spent on this Encounter   I, Cristina Esquivel MD, personally saw the patient today and spent greater than 30 minutes discharging this patient.       Cristina Esquivel MD  [facility], [place]  ______________________________________________________________________    Physical Exam   Vital Signs: Temp: 97.8  F (36.6  C) Temp src: Oral BP: 122/62   Heart Rate: 59 Resp: 12 SpO2: 95 % O2 Device: None (Room air)    Weight: 136 lbs 7.44 oz    Physical Exam  Constitutional:       General: She is not in acute distress.     Appearance: Normal appearance. She is normal weight. She is ill-appearing (chronically).   HENT:      Head: Normocephalic and atraumatic.      Nose: Nose normal.      Mouth/Throat:      Mouth: Mucous membranes are moist.      Comments: Poor dentition  Eyes:      Extraocular Movements: Extraocular movements intact.      Pupils: Pupils are equal, round, and reactive to light.   Neck:      Musculoskeletal: Normal range of motion.   Cardiovascular:      Rate and Rhythm: Normal rate and regular rhythm.      Pulses: Normal pulses.      Heart sounds: No murmur.   Pulmonary:      Effort: Pulmonary effort is normal. No respiratory distress.      Breath sounds: No wheezing or rhonchi.   Abdominal:      General: Abdomen is flat. Bowel sounds are normal.      Palpations: Abdomen is soft.   Musculoskeletal: Normal range of motion.   Skin:     General: Skin is warm and dry.      Coloration: Skin is not jaundiced.      Findings: No bruising.   Neurological:      General: No focal deficit present.      Mental Status: She is alert and oriented to person, place, and time.              Primary Care Physician   Ailyn Nieves    Discharge Orders      Home care nursing referral      Reason for your hospital stay    You were admitted to the hospital because of low blood pressure at the time of dialysis.  You had missed your dialysis session and also had elevated potassium  at that time.  You were evaluated for infection and empirically treated for a pneumonia with antibiotics for 7 days.  Active TB was also ruled out.  Your thyroid function was also noted to be abnormal and your thyroid medication called Synthroid has been adjusted to a new dose.  Please make sure you take the correct dose as prescribed at the time of discharge.  You will need follow-up blood work done in 4 to 6 weeks with your primary care doctor or endocrine clinic as you choose.  You will need to follow-up the final cultures with your primary care doctor at the time of follow-up.  You had initially agreed to go to a transitional care unit but decided to go home instead.  Home care has been arranged for you.  All your medications have been prescribed and some of them have refills as well.  Please report to your designated dialysis unit at the designated time on Tuesday for your next dialysis session.  Thank you for choosing [facility].     Adult [facility] Follow-up and recommended labs and tests    Follow up with primary care provider, Ailyn Nieves, within 7 days for hospital follow- up.  The following labs/tests are recommended: CBC, BMP.  Thyroid function test    You could follow up with endocrine clinic for your hypothyroidism    You will need Thyroid function checked in 4-6 weeks post discharge.    Appointments on [place] and/or [place] (with [facility] or [facility] provider or service). Call [phone] if you haven't heard regarding these appointments within 7 days of discharge.     Activity    Your activity upon discharge: activity as tolerated     Full Code     Diet    Follow this diet upon discharge: Orders Placed This Encounter      Regular Diet Adult       Significant Results and Procedures   Most Recent 3 CBC's:  Recent Labs   Lab Test 01/31/20  0447 01/30/20  0543 01/29/20  0541   WBC 5.5 7.1 8.2   HGB 10.3* 10.1* 9.5*   * 100 100    191 163     Most Recent 3 BMP's:  Recent Labs   Lab  Test 01/31/20  0447 01/30/20  0543 01/29/20  0541    135 135   POTASSIUM 4.7 5.0 4.1   CHLORIDE 100 101 102   CO2 26 25 29   BUN 33* 45* 23   CR 3.16* 3.81* 2.17*   ANIONGAP 7 9 4   FRANCES 8.3* 8.6 8.2*   * 142* 112*     Most Recent 2 LFT's:  Recent Labs   Lab Test 01/27/20  0420 01/26/20  0719   AST 47* 32   ALT 51* 21   ALKPHOS 128 125   BILITOTAL 0.3 0.4   ,   Results for orders placed or performed during the hospital encounter of 01/25/20   XR Chest Port 1 View    Narrative    AP chest    Comparisons: 12/25/2019    HISTORY: Evaluate for fluid overload. End-stage renal disease with  missed hemodialysis    FINDINGS: Right IJ central venous catheter is unchanged. Pulmonary  vascularity is distinct. No significant pulmonary edema. Mild blunting  of the left costophrenic angle. Cardiac silhouette is normal in size.  Vascular stent projecting over the left upper mediastinum.      Impression    IMPRESSION: Small left pleural effusion. Otherwise clear lungs.    SHARMIN SUN MD   POC US ECHO LIMITED    Impression    Limited Bedside Cardiac Ultrasound, performed and interpreted by me.   Indication: Hypotension/shock.  Parasternal long axis, parasternal short axis and apical 4 chamber views were acquired.   Image quality was satisfactory.    Findings:    Global left ventricular function appears intact.  Chambers do not appear dilated.  There is no evidence of free fluid within the pericardium.    IMPRESSION: Grossly normal left ventricular function and chamber size.  No pericardial effusion.     XR Chest Port 1 View    Narrative     Examination:  XR CHEST PORT 1 VW     Date:  1/26/2020 11:01 AM      Clinical Information: line placement     Additional Information: none    Comparison: 1/25/2020, 8:10 AM chest x-ray.    Findings:     There is new left IJ line with the tip projecting in the lower SVC.  The right-sided multiport catheter has a tip projected in the lower  SVC. Left-sided pleural effusion is  present and there is left basilar  atelectasis. Mild interstitial edema is suspected.      Impression    Impression:    1. The left IJ line the tip projecting in lower SVC.  2. Stable mild interstitial edema.  3. Small left-sided pleural effusion, little increased over previous  study..    FREOD OLEARY MD   XR Chest Port 1 View    Narrative    XR CHEST PORT 1 VW  1/27/2020 3:05 AM      HISTORY: CVC placement- having more ectopy    COMPARISON: 1/26/2020    FINDINGS: Right IJ central venous line tip is at the atriocaval  junction. Left IJ central venous line tip is at the mid right atrium.  Stent over the left upper mediastinum. Small left pleural effusion,  unchanged. Unchanged diffuse interstitial opacities.      Impression    IMPRESSION: Right IJ central venous line tip is at the atriocaval  junction. Left IJ central venous line tip is at the mid right atrium.    I have personally reviewed the examination and initial interpretation  and I agree with the findings.    RADHA LOYD DO   US Lower Extremity Venous Duplex Bilateral    Narrative    EXAMINATION: DOPPLER VENOUS ULTRASOUND OF BILATERAL LOWER EXTREMITIES,  1/28/2020 8:40 AM     COMPARISON: None.    HISTORY: Patient tachycardic, tachypneic with increasing O2  requirement; rule out clots    TECHNIQUE:  Gray-scale evaluation with compression, spectral flow and  color Doppler assessment of the deep venous system of both legs from  groin to knee, and then at the ankles.    FINDINGS:  In both lower extremities, the common femoral, femoral, popliteal and  posterior tibial veins demonstrate normal compressibility and blood  flow.      Impression    IMPRESSION:  No evidence of deep venous thrombosis in either lower extremity.    I have personally reviewed the examination and initial interpretation  and I agree with the findings.    NICHOLAS MARX MD   US Upper Extremity Venous Duplex Bilat    Narrative    EXAMINATION: DOPPLER VENOUS ULTRASOUND OF BILATERAL  UPPER EXTREMITIES,  2020 8:40 AM     COMPARISON: 2018.    HISTORY: Patient tachycardic, tachypnea with increasing O2  requirement; rule out clots    TECHNIQUE:  Gray-scale evaluation with compression, spectral flow, and  color Doppler assessment of the deep venous system of both upper  extremities.    FINDINGS:  Normal blood flow and waveforms are demonstrated in the internal  jugular, innominate, subclavian bilaterally and the left axillary  vein. The right axillary vein is not visualized due to overlying  bandages and catheter. There is normal compressibility of the  bilateral brachial and basilic veins and the right cephalic vein. The  left cephalic vein is not visualized due to overlying bandage.      Impression    IMPRESSION:  No evidence of deep venous thrombosis in the visualized right and left  upper extremity veins. The right axillary and left cephalic veins are  not visualized due to overlying bandages.    I have personally reviewed the examination and initial interpretation  and I agree with the findings.    NICHOLAS MARX MD   Echocardiogram Limited    Narrative    [id number]  [id number]  [id number]  [id number]^HIPOLITO^TONA^- SHANTEL MCMILLAN           [facility],[place]  Echocardiography Laboratory  [address] [place] [id number]     Name: JONEL CHAVEZ  MRN: [id number]  : 1945  Study Date: 2020 10:27 AM  Age: 74 yrs  Gender: Female  Patient Location: [facility]  Reason For Study: Other Cardiac Device In Situ  Ordering Physician: TONA MCMILLAN  Performed By: Guido Ventura RDCS     BSA: 1.8 m2  Height: 68 in  Weight: 151 lb  BP: 168/84 mmHg  _____________________________________________________________________________  __        Procedure  Limited Portable Echo Adult.  _____________________________________________________________________________  __        Interpretation Summary  Global and regional left ventricular function is normal with an EF  of 60-65%.  Right ventricular function, chamber size, wall motion, and thickness are  normal.  Right ventricular systolic pressure is 53mmHg above the right atrial pressure.  IVC diameter and respiratory changes fall into an intermediate range  suggesting an RA pressure of 8 mmHg.  No pericardial effusion is present.  _____________________________________________________________________________  __        Left Ventricle  Global and regional left ventricular function is normal with an EF of 60-65%.  No regional wall motion abnormalities are seen.     Right Ventricle  Right ventricular function, chamber size, wall motion, and thickness are  normal.     Mitral Valve  Mild to moderate mitral insufficiency is present.        Tricuspid Valve  Trace tricuspid insufficiency is present. Right ventricular systolic pressure  is 53mmHg above the right atrial pressure.     Vessels  Dilation of the inferior vena cava is present with normal respiratory  variation in diameter. IVC diameter and respiratory changes fall into an  intermediate range suggesting an RA pressure of 8 mmHg.     Pericardium  No pericardial effusion is present. Prominent epicardial fat is noted.  _____________________________________________________________________________  __           Doppler Measurements & Calculations  TV max P.8 mmHg  TR max alberto: 363.3 cm/sec  TR max P.8 mmHg        _____________________________________________________________________________  __        Report approved by: Enrique Maloney 2020 11:32 AM        *Note: Due to a large number of results and/or encounters for the requested time period, some results have not been displayed. A complete set of results can be found in Results Review.       Discharge Medications   Current Discharge Medication List      CONTINUE these medications which have CHANGED    Details   atorvastatin (LIPITOR) 40 MG tablet Take 1 tablet (40 mg) by mouth At Bedtime  Qty: 30 tablet, Refills: 0     Associated Diagnoses: Hyperlipidemia LDL goal <100      docusate sodium (COLACE) 100 MG capsule Take 2 capsules (200 mg) by mouth daily for 10 days Pt last took 11/19/19  Qty: 20 capsule, Refills: 0    Associated Diagnoses: Constipation, unspecified constipation type      levothyroxine (SYNTHROID/LEVOTHROID) 175 MCG tablet Take 1 tablet (175 mcg) by mouth every morning (before breakfast)  Qty: 30 tablet, Refills: 0    Associated Diagnoses: Hypothyroidism, unspecified type      multivitamin RENAL (NEPHROCAPS/TRIPHROCAPS) 1 MG capsule Take 1 capsule by mouth daily  Qty: 30 capsule, Refills: 0    Associated Diagnoses: CKD (chronic kidney disease) stage 4, GFR 15-29 ml/min (H)      oxyCODONE (ROXICODONE) 5 MG tablet Take 1 tablet (5 mg) by mouth every 8 hours as needed for moderate to severe pain  Qty: 15 tablet, Refills: 0    Associated Diagnoses: Chronic low back pain without sciatica, unspecified back pain laterality      polyethylene glycol (MIRALAX/GLYCOLAX) packet Take 17 g by mouth daily as needed for constipation  Qty: 10 packet, Refills: 0    Associated Diagnoses: Slow transit constipation         CONTINUE these medications which have NOT CHANGED    Details   albuterol (PROAIR HFA/PROVENTIL HFA/VENTOLIN HFA) 108 (90 Base) MCG/ACT inhaler Inhale 2 puffs into the lungs every 6 hours as needed for shortness of breath / dyspnea or wheezing  Qty: 18 g, Refills: 3    Associated Diagnoses: Chronic obstructive pulmonary disease, unspecified COPD type (H)      albuterol (PROVENTIL) (2.5 MG/3ML) 0.083% neb solution Take 1 vial (2.5 mg) by nebulization every 6 hours as needed for shortness of breath / dyspnea or wheezing  Qty: 180 mL, Refills: 3    Associated Diagnoses: COPD exacerbation (H)      ammonium lactate (LAC-HYDRIN) 12 % external lotion Apply topically 2 times daily  Qty: 225 g, Refills: 0    Associated Diagnoses: Dry skin      ASPIR-LOW 81 MG EC tablet Take 1 tablet (81 mg) by mouth daily  Qty: 90 tablet,  Refills: 3    Associated Diagnoses: History of MI (myocardial infarction); Essential hypertension, benign      blood glucose monitoring (FREESTYLE LITE) test strip TEST BLOOD SUGAR TWO TIMES A DAY  Qty: 50 strip, Refills: 12    Associated Diagnoses: Type 2 diabetes mellitus without complication, with long-term current use of insulin (H)      blood glucose monitoring (FREESTYLE) lancets Test BS two times daily as directed  Qty: 100 each, Refills: 1    Associated Diagnoses: Type 2 diabetes mellitus without complication, with long-term current use of insulin (H)      Emollient (EUCERIN CALMING DAILY MOIST) CREA Externally apply 1 dose. topically daily  Qty: 1 Tube, Refills: 12    Associated Diagnoses: Type II or unspecified type diabetes mellitus with neurological manifestations, not stated as uncontrolled(250.60) (H)      fluticasone-salmeterol (ADVAIR) 250-50 MCG/DOSE inhaler Inhale 1 puff into the lungs every 12 hours  Qty: 1 Inhaler, Refills: 5    Associated Diagnoses: COPD exacerbation (H)      nitroGLYcerin (NITROSTAT) 0.4 MG sublingual tablet Place 1 tablet (0.4 mg) under the tongue every 5 minutes as needed for chest pain  Qty: 25 tablet, Refills: 0    Associated Diagnoses: History of MI (myocardial infarction)      !! order for DME Equipment being ordered: Nebulizer  Qty: 1 Device, Refills: 0    Associated Diagnoses: Chronic obstructive pulmonary disease, unspecified COPD type (H)      !! order for DME Equipment being ordered: Boost.  Qty: 6 Can, Refills: 3    Associated Diagnoses: CKD (chronic kidney disease) stage 5, GFR less than 15 ml/min (H)      !! order for DME Equipment being ordered: cane  Qty: 1 each, Refills: 0    Associated Diagnoses: Non-compliant patient      !! order for DME Equipment being ordered: Diabetic Shoes  Qty: 1 each, Refills: 0    Associated Diagnoses: Type 2 diabetes mellitus with stage 5 chronic kidney disease not on chronic dialysis, without long-term current use of insulin (H)       !! order for DME Equipment being ordered: two pairs moderate knee high support hose  Qty: 2 Device, Refills: 1    Associated Diagnoses: Edema, unspecified type      !! order for DME Equipment being ordered: Compression socks.  Strength:15-20 mmHg  Qty: 2 each, Refills: 0    Associated Diagnoses: Localized edema      !! order for DME One wheeled walker with seat and brakes and basket  Qty: 1 Device, Refills: 0    Associated Diagnoses: Risk for falls      tiotropium (SPIRIVA) 18 MCG inhaled capsule Inhale 1 capsule (18 mcg) into the lungs daily  Qty: 30 capsule, Refills: 11    Associated Diagnoses: Chronic bronchitis, unspecified chronic bronchitis type (H); Pulmonary emphysema, unspecified emphysema type (H)      vitamin D3 (CHOLECALCIFEROL) 2000 units (50 mcg) tablet Take 1 tablet (2,000 Units) by mouth daily  Qty: 90 tablet, Refills: 3    Associated Diagnoses: Vitamin D deficiency disease       !! - Potential duplicate medications found. Please discuss with provider.      STOP taking these medications       amoxicillin-clavulanate (AUGMENTIN) 500-125 MG tablet Comments:   Reason for Stopping:         nystatin (MYCOSTATIN) [id number] UNIT/GM POWD Comments:   Reason for Stopping:             Allergies   Allergies   Allergen Reactions     Contrast Dye Hives and Itching     Clonidine      She had as IP and thinks it made her itchy     Diatrizoate Other (See Comments)     Diltiazem      Severe bradycardia     Iodine-131

## 2020-02-01 NOTE — PROGRESS NOTES
HEMODIALYSIS TREATMENT NOTE    Date: 2/1/2020  Time: 12:39 PM    Data:  Pre Wt: 62.8 kg (138 lb 7.2 oz)   Desired Wt: 62.8 kg   Post Wt: 61.9 kg (136 lb 7.4 oz)  Weight change: 0.9 kg  Ultrafiltration - Post Run Net Total Removed (mL): 1000 mL  Vascular Access Status: CVC  Dialyzer Rinse: Light  Total Blood Volume Processed: 76.61 L   Total Dialysis (Treatment) Time: 3.5 hours   Dialysate Bath: K 2, Ca 3  Heparin: None  Consent signed: 1/25/20    Lab:   No    Interventions/Assessment:  Dialyzed for 3.5 hours with 1kg fluid removed. BPs soft at beginning of treatment, UF minimal for first half of treatment until BPs improved, then set UF goal for 1kg. CVC used for treatment, occasional alarms while coughing. Standing weights taken before and after treatment. Right arm AV fistula bruised from prior infiltrations, positive bruit and thrill. Patient slept for most of treatment, tolerated well. CVC lines locked with saline and ClearGuard caps placed. Hand off report given to floor nurse.     Plan:    Next HD per renal team.

## 2020-02-02 ENCOUNTER — PATIENT OUTREACH (OUTPATIENT)
Dept: CARE COORDINATION | Facility: CLINIC | Age: 75
End: 2020-02-02

## 2020-02-02 NOTE — DISCHARGE SUMMARY
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      Assessment & Recommendations:      # ESRD on TTS iHD  Patient gets HD with Dr Liz at [facility], running for 3.0 hours still via her CVC with AVF created back in 11/2019. EDW 57.5 kg.    - Patient received CRRT 1/25 -1/27   - Continue TTS schedule    - Avoid venipuncture/blood pressures right arm   - AVF well developed. Surgery date was 11/22/13. Discussed with Dr Vega 1/28 and she was fine with us cannulating the AVF. We were able to cannulate w/o difficulty on 1/29 and 1/30 but patient moved her arm and infiltrated. Resumed use of TDC   - Resume trial of AVF in OP setting     # HTN / volume status - Improving volume status. No LE edema. Off supplemental oxygen. Standing weight today is 62.8 kg and she is not tolerating much fluid removal. Previous EDW 57.5 kg. Admission weight 63.5 kg.  Blood pressures today teens/ . Not on antihypertensives at baseline   - Increase EDW to 61.5 kg   - Was only able to remove 1 kg today     # electrolytes - No labs today      # Acid base - No labs today     # anemia - Hgb 10.1    - On Mircera ( will hold here, but resume upon return to OP HD)   - Have completed Venofer course that was started in OP HD unit, will begin 50 mg q Tuesday on 2/4     # MBD-CKD - Ica 5.0, Phos 2.8, albumin 3.5   - Holding Calcitriol 0.5 mcg IV q run while inpatient.  Will resume in OP HD unit    # Disposition - Discharging to home today. Patient refusing TCU    [x] Resume all previous dialysis orders with exception as noted below    New Orders (if not applicable put NA):  Estimated Dry Weight 61.5 kg   Dialysis Duration Na   Dialysis Access #17 g needles with BFR per protocol    Antibiotics (dose per dialysis, end date) NA           Labs to be drawn at dialysis Na   Other major changes to dialysis prescription (e.g. Dialysate bath, heparin, blood flow rate, etc)   NA   Medication changes (also fax the unit a copy of the discharge summary)         NA     Name of physician completing this form: Mita Ellsworth NP   Pager - [phone]

## 2020-02-03 ENCOUNTER — PATIENT OUTREACH (OUTPATIENT)
Dept: GERIATRIC MEDICINE | Facility: CLINIC | Age: 75
End: 2020-02-03

## 2020-02-03 ENCOUNTER — TELEPHONE (OUTPATIENT)
Dept: ENDOCRINOLOGY | Facility: CLINIC | Age: 75
End: 2020-02-03

## 2020-02-03 NOTE — TELEPHONE ENCOUNTER
Patient was contacted by an RN for post DC follow up so no duplicate post DC follow up call will be made    Althea Edmondson CMA   Post Hospital Discharge Team

## 2020-02-03 NOTE — TELEPHONE ENCOUNTER
[facility] Call Center    Phone Message    May a detailed message be left on voicemail: yes     Reason for Call: Other: Patient was discharged from [facility] on 2/1, they suggest that she follow up with Endocrinology for hyopthyroidism. Patient saw Dr. Smitha Garnica for inpatient consult. Please contact patient directly for appointment options.      Action Taken: Message routed to:  [facility] Endocrinology Clinic    Travel Screening: Not Applicable-  Patient not on phone

## 2020-02-03 NOTE — PROGRESS NOTES
[facility] Care Coordination Contact    1/31/2020 Rec'd vm from Malika CHANEL at [facility] to report that the plan was for member to discharge today to TCU, but admission to [facility] has been cancelled and member is declined admission to any of [facility], because at previous admission, member was smoking in her room.   Malika states that member is declining TCU and wishes to return home.    Plan is to discharge 2/1/2020 with [facility] referral.  2/1/2020 Rec'd vm from Felicia CHANEL to report that member discharged home today with referral to [facility]  2/3/2020 Confirmed discharge 2/1/2020  Spoke with Pratibha ENG at Dialysis to report that member discharged from hospital, scheduled care conference with Pratibha and  at dialysis 2/4/2020 @ 8:45  Call placed to [facility] spoke with Makenzie to share that it is difficult to contact member at her home number. CC provided information on member's dialysis schedule and stated that home care will be able to contact her there to set up a visit.   Refer to KERRIE log for further information.  Sakina Carrion RN, BC  Manager [facility] Care Coordinator   [phone]  (Fax)

## 2020-02-03 NOTE — PLAN OF CARE
Occupational Therapy Discharge Summary     Reason for therapy discharge:    Discharged to home w/ 24/7 A      Progress towards therapy goal(s). See goals on Care Plan in [facility] electronic health record for goal details.  Goals partially met.  Barriers to achieving goals:   discharge from facility.     Therapy recommendation(s):    Continued HEP

## 2020-02-04 ENCOUNTER — TELEPHONE (OUTPATIENT)
Dept: FAMILY MEDICINE | Facility: CLINIC | Age: 75
End: 2020-02-04

## 2020-02-04 ENCOUNTER — PATIENT OUTREACH (OUTPATIENT)
Dept: GERIATRIC MEDICINE | Facility: CLINIC | Age: 75
End: 2020-02-04

## 2020-02-04 NOTE — TELEPHONE ENCOUNTER
Pt discharged on 2/1.   FYI   homecare can't get out until 2/7  OK?    Berkley Fisher, RN, BSN

## 2020-02-04 NOTE — TELEPHONE ENCOUNTER
Spoke with home care nurse and approved delay of visits.Angelina Hollis RN February 4, 2020 2:08 PM

## 2020-02-04 NOTE — PROGRESS NOTES
[facility] Care Coordination Contact    Met with Kim at dialysis this morning.  Kim reports that she is doing fine. Noted that member was lethargic and would dose off and required CC to wake her.    Kim stated that she would like assistance with meals. Completed Elderly Waiver paperwork, Kim agreeable to sign the forms. Explained that forms will be faxed to [email or web address] for approval.  Explained that once the Elderly Waiver has been approved, a referral to [employer] delivered meals will be placed.  Inquired on how she was doing in her home, Kim stated that she is doing well, stating that she has no concerns. Kim states that she is able to walk up/down the steps to her bedroom and use the bathroom independently.  Discussed home medication management, Kim stated that she would like help. Explained that a referral to [facility] has been placed and they will be calling her at dialysis to schedule a home visit.  Kim then stated she was unsure if she had medications at home.  This information shared with Pratibha ENG at dialysis.  Offered assistance with scheduling hospital follow up with PCP, with Kim's permission, scheduled appointment for 2/7/2020 @ 11 AM. Kim stated that she will make arrangements for her own ride.  Offered transportation assistance to dialysis, Kim did agree.  CC to assist with scheduling rides through [employer], with  at her home around 6:15 AM.      Rec'd vm from Ban at [facility] requesting a return call, stating that she needs the tele number to member's dialysis unit. [phone].  Call placed to Ban to provide contact information to member's dialysis unit. Ban states that she is unsure how future communication with work as member does not answer her home phone.    Rec'd tele call back from Ban to report that she did talk with member to schedule a visit with a nurse. Ban states that member had difficulty with following conversation and called  member's emergency contact Ginette. Ban states that the initial home visit will be 2/7/20 @ 11 AM and Ginette will be present. Explained that member has a PCP appt at this time, CC will call and reschedule.   Call placed to PCC, rescheduled visit for 2/7/200 @ 2:30.  Explained that member informed CC that she is unsure if she has medication at home and that [facility] will see member 2/7/20 @ 11 AM.    Call placed back to dialysis unit, request that they update the paperwork that CC provided to member with her PCP appt to 2:30 and not 11 AM.    Call placed to Kim's emergency contact Ginette, introduced myself and explained that CC met with her at dialysis this morning. Explained that Kim had mentioned that she is unsure if she has her medications at home. Ginette stated that Kim came to her house to see her this afternoon. Ginette stated that Kim looked good, was alert and she had no concerns.   Ginette states that Kim gave her cell phone to her granddaughter and that Kim has a difficult time answering the phone when she is upstairs.  Ginette will attempt to make contact with Kim's son and daughter to inquire if  has her medications.    Ginette will also inform family of the PCP appt 2/7/20 @ 2:30.   CC to reach out to Kim tomorrow.  Sakina Carrion RN, BC  Manager [facility] Care Coordinator   [phone]  (Fax)

## 2020-02-04 NOTE — TELEPHONE ENCOUNTER
That should be fine, she should have enough medication and supplies to get her to that day.   She is also scheduled to see me 2/7 and we can check in at that time.   JUNIOR Ragland CNP

## 2020-02-04 NOTE — PROGRESS NOTES
[facility] Care Coordination Contact    Arranged transportation thru [facility] for the below appt:.   Appt Date & /Time: 2/6. 2/8, 2/11, 2/13 and 2/15  Clinic Name & Address:  [facility] [address] [place]   Transportation Provider: Elissa Hands   time:  5:45-6:15Am (requested 6:15 pick-up). Return ride to be at 11:15AM    Notified member of  time.    Alisa Griffith  Care Management Specialist  [facility]  [phone]

## 2020-02-05 NOTE — PROGRESS NOTES
[facility] Care Coordination Contact    Call placed to member's home, no answer, call placed again and there was a voice message. Left message to remind Kim that a taxi has been arranged to take her to dialysis tomorrow.  CC also inquired if she was able to find her medications. CC requests a return call.  3:15 PM Call placed to member's home, female answering the phone reports that member is not home. Provided CC contact information and requested a return call, explained that CC also left member a vm.     Sakina Carrion RN, BC  Manager [facility] Care Coordinator   [phone]  (Fax)

## 2020-02-06 ENCOUNTER — HOSPITAL ENCOUNTER (EMERGENCY)
Facility: CLINIC | Age: 75
Discharge: HOME | End: 2020-02-06
Attending: EMERGENCY MEDICINE | Admitting: EMERGENCY MEDICINE
Payer: COMMERCIAL

## 2020-02-06 ENCOUNTER — TRANSFERRED RECORDS (OUTPATIENT)
Dept: HEALTH INFORMATION MANAGEMENT | Facility: CLINIC | Age: 75
End: 2020-02-06

## 2020-02-06 VITALS
SYSTOLIC BLOOD PRESSURE: 90 MMHG | DIASTOLIC BLOOD PRESSURE: 45 MMHG | OXYGEN SATURATION: 94 % | RESPIRATION RATE: 16 BRPM | BODY MASS INDEX: 20.78 KG/M2 | TEMPERATURE: 96 F | HEART RATE: 58 BPM | WEIGHT: 136.69 LBS

## 2020-02-06 DIAGNOSIS — I95.9 HYPOTENSION, UNSPECIFIED HYPOTENSION TYPE: ICD-10-CM

## 2020-02-06 DIAGNOSIS — M62.81 GENERALIZED MUSCLE WEAKNESS: ICD-10-CM

## 2020-02-06 LAB
ALBUMIN SERPL-MCNC: 3.3 G/DL (ref 3.4–5)
ALP SERPL-CCNC: 215 U/L (ref 40–150)
ALT SERPL W P-5'-P-CCNC: 40 U/L (ref 0–50)
ANION GAP SERPL CALCULATED.3IONS-SCNC: 3 MMOL/L (ref 3–14)
AST SERPL W P-5'-P-CCNC: 21 U/L (ref 0–45)
BASOPHILS # BLD AUTO: 0 10E9/L (ref 0–0.2)
BASOPHILS NFR BLD AUTO: 0.5 %
BILIRUB SERPL-MCNC: 0.3 MG/DL (ref 0.2–1.3)
BUN SERPL-MCNC: 26 MG/DL (ref 7–30)
CALCIUM SERPL-MCNC: 8 MG/DL (ref 8.5–10.1)
CHLORIDE SERPL-SCNC: 100 MMOL/L (ref 94–109)
CO2 SERPL-SCNC: 33 MMOL/L (ref 20–32)
CREAT SERPL-MCNC: 2.91 MG/DL (ref 0.52–1.04)
DIFFERENTIAL METHOD BLD: ABNORMAL
EOSINOPHIL # BLD AUTO: 0.4 10E9/L (ref 0–0.7)
EOSINOPHIL NFR BLD AUTO: 5.3 %
ERYTHROCYTE [DISTWIDTH] IN BLOOD BY AUTOMATED COUNT: 16.8 % (ref 10–15)
GFR SERPL CREATININE-BSD FRML MDRD: 15 ML/MIN/{1.73_M2}
GLUCOSE SERPL-MCNC: 136 MG/DL (ref 70–99)
HCT VFR BLD AUTO: 32.7 % (ref 35–47)
HGB BLD-MCNC: 10.1 G/DL (ref 11.7–15.7)
IMM GRANULOCYTES # BLD: 0 10E9/L (ref 0–0.4)
IMM GRANULOCYTES NFR BLD: 0.5 %
LYMPHOCYTES # BLD AUTO: 1 10E9/L (ref 0.8–5.3)
LYMPHOCYTES NFR BLD AUTO: 13.4 %
MCH RBC QN AUTO: 31.7 PG (ref 26.5–33)
MCHC RBC AUTO-ENTMCNC: 30.9 G/DL (ref 31.5–36.5)
MCV RBC AUTO: 103 FL (ref 78–100)
MONOCYTES # BLD AUTO: 0.5 10E9/L (ref 0–1.3)
MONOCYTES NFR BLD AUTO: 6.3 %
NEUTROPHILS # BLD AUTO: 5.6 10E9/L (ref 1.6–8.3)
NEUTROPHILS NFR BLD AUTO: 74 %
NRBC # BLD AUTO: 0 10*3/UL
NRBC BLD AUTO-RTO: 0 /100
PLATELET # BLD AUTO: 305 10E9/L (ref 150–450)
POTASSIUM SERPL-SCNC: 4.1 MMOL/L (ref 3.4–5.3)
PROT SERPL-MCNC: 6.9 G/DL (ref 6.8–8.8)
RBC # BLD AUTO: 3.19 10E12/L (ref 3.8–5.2)
SODIUM SERPL-SCNC: 136 MMOL/L (ref 133–144)
WBC # BLD AUTO: 7.6 10E9/L (ref 4–11)

## 2020-02-06 PROCEDURE — 99283 EMERGENCY DEPT VISIT LOW MDM: CPT | Mod: Z6 | Performed by: EMERGENCY MEDICINE

## 2020-02-06 PROCEDURE — 85025 COMPLETE CBC W/AUTO DIFF WBC: CPT | Performed by: EMERGENCY MEDICINE

## 2020-02-06 PROCEDURE — 80053 COMPREHEN METABOLIC PANEL: CPT | Performed by: EMERGENCY MEDICINE

## 2020-02-06 PROCEDURE — 99283 EMERGENCY DEPT VISIT LOW MDM: CPT

## 2020-02-06 ASSESSMENT — ENCOUNTER SYMPTOMS
EYE REDNESS: 0
FEVER: 0
ABDOMINAL PAIN: 0
DIFFICULTY URINATING: 0
HEADACHES: 0
CONFUSION: 0
ARTHRALGIAS: 0
NECK STIFFNESS: 0
WEAKNESS: 1
COLOR CHANGE: 0
SHORTNESS OF BREATH: 0
FATIGUE: 1

## 2020-02-06 NOTE — ED NOTES
Bed: ED05  Expected date:   Expected time:   Means of arrival:   Comments:  EMS Service:  HEMS [id number]    Med/Trauma C/o:  75 y/o F - generalized weakness, s/p dialysis    Triaged:  YELLOW    ETA:  NOW    Chip Rodriges RN  February 6, 2020
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
## 2020-02-06 NOTE — ED AVS SNAPSHOT
[facility], [place], Emergency Department  [address] [place] [id number]  Phone:  [phone]                                    Kim Anne   MRN: [id number]    Department:  [facility], Emergency Department   Date of Visit:  2/6/2020           After Visit Summary Signature Page    I have received my discharge instructions, and my questions have been answered. I have discussed any challenges I see with this plan with the nurse or doctor.    ..........................................................................................................................................  Patient/Patient Representative Signature      ..........................................................................................................................................  Patient Representative Print Name and Relationship to Patient    ..................................................               ................................................  Date                                   Time    ..........................................................................................................................................  Reviewed by Signature/Title    ...................................................              ..............................................  Date                                               Time          22EPIC Rev 08/18

## 2020-02-06 NOTE — ED TRIAGE NOTES
BIBA from dialysis clinic with concerns of low BP's 70' s for medics patient BP was wnl an the 110's and next check was in the 94/ other thatn that normal dialysis treatment.  Main concern for patient is decrease in strength. Full run.

## 2020-02-07 ENCOUNTER — PATIENT OUTREACH (OUTPATIENT)
Dept: GERIATRIC MEDICINE | Facility: CLINIC | Age: 75
End: 2020-02-07

## 2020-02-07 DIAGNOSIS — Z76.89 HEALTH CARE HOME: ICD-10-CM

## 2020-02-07 NOTE — PROGRESS NOTES
[facility] Care Coordination Contact    Call placed to [employer]isa [employer] to inquire if EW paperwork had been received ([employer]HS [id number] and 0630).  Per Yulisa Nair, EW will be eff 2/7/2020 and the U Code is removed.  Request to sent to support staff to place referral to [employer] 7 meals per week.    MMIS completed. Care Plan updated, per earlier conversation with member, she does not want the provider to receive any care plan information.    Attempted to reach member to inform of the above., CC informed that member is not home.  Sakina Carrion RN, BC  Manager [facility] Care Coordinator   [phone]  (Fax)

## 2020-02-07 NOTE — TELEPHONE ENCOUNTER
Spoke with pt's daughter, April. Pt is scheduled for follow-up this Tuesday. Confirmed date/time/location with pt's daughter, and gave our clinic number as well.

## 2020-02-07 NOTE — PROGRESS NOTES
[facility] Care Coordination Contact    2/6/2020 Rec'd notification that member was a NO SHOW for her taxi ride to dialysis on 2/6/20. Member did attend her dialysis appointment.   CC to follow  Sakina Carrion RN, BC  Manager [facility] Care Coordinator   [phone]  (Fax)
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
## 2020-02-10 ENCOUNTER — TELEPHONE (OUTPATIENT)
Dept: BEHAVIORAL HEALTH | Facility: CLINIC | Age: 75
End: 2020-02-10

## 2020-02-10 NOTE — TELEPHONE ENCOUNTER
C called patient to follow up on missed appointment. Left a message providing clinic number to reschedule.

## 2020-02-13 ENCOUNTER — PATIENT OUTREACH (OUTPATIENT)
Dept: GERIATRIC MEDICINE | Facility: CLINIC | Age: 75
End: 2020-02-13

## 2020-02-13 NOTE — PROGRESS NOTES
[facility] Care Coordination Contact    Member Signature - POC Change:  Per CC, member has made a change to their POC.  Care Plan Change Letter mailed to member for signature with a self-addressed return envelope.     Alisa Griffith  Care Management Specialist  [facility]  [phone]

## 2020-02-18 NOTE — PROGRESS NOTES
[facility] Care Coordination Contact    2/13/2020 Received vm from Lashell Peterson at [facility] Adult [facility], stating that they have received a report of concern and not sure if they have enough to open the case.  [phone]  2/13/2020 Left vm with Lashell returning her call. Provided CC cell phone number  2/14/2020 Call placed to Lashell, CC provided information and answered questions that Lashell requested.  CC provided information on the past few months; member visits, condition of home, hospital/ED visits.  Explained that CC is aware of a couple missed dialysis appointments and encouraged her to f/u directly with dialysis.  Shared that member was not initially agreeable to EW or recommendations from TCU, but recently agreed to MOWs through [employer]. Shared that member has missed (No Show) PCP appointments.  Lashell states that at this time they will not open the case, as there is no evidence of harm and that she is a decisional adult and has now agreed to services.  Lashell requests a return call if there is evidence of harm, or if anyone that is obstructing care, or financial exploitation  Sakina Carrion RN, BC  Manager [facility] Care Coordinator   [phone]  (Fax)  .

## 2020-02-20 ENCOUNTER — PATIENT OUTREACH (OUTPATIENT)
Dept: GERIATRIC MEDICINE | Facility: CLINIC | Age: 75
End: 2020-02-20

## 2020-02-20 NOTE — PROGRESS NOTES
[facility] Care Coordination Contact    Call from [employer] who state they have been unable to reach member to confirm first delivery.  First delivery will be 2/26, 7 meals.  They are wondering if this CC would attempt to reach member.  CC call to member.  Unable to reach.  Unable to leave VM.  Batool Mai RN, BSN, PHN  [facility]  [phone]  Fax: [phone]

## 2020-02-25 ENCOUNTER — PATIENT OUTREACH (OUTPATIENT)
Dept: GERIATRIC MEDICINE | Facility: CLINIC | Age: 75
End: 2020-02-25

## 2020-02-25 ENCOUNTER — APPOINTMENT (OUTPATIENT)
Dept: GENERAL RADIOLOGY | Facility: CLINIC | Age: 75
DRG: 070 | End: 2020-02-25
Attending: EMERGENCY MEDICINE
Payer: COMMERCIAL

## 2020-02-25 ENCOUNTER — HOSPITAL ENCOUNTER (INPATIENT)
Facility: CLINIC | Age: 75
LOS: 6 days | Discharge: NURSING, REHAB OR LONG-TERM CARE | DRG: 070 | End: 2020-03-02
Attending: EMERGENCY MEDICINE | Admitting: INTERNAL MEDICINE
Payer: COMMERCIAL

## 2020-02-25 DIAGNOSIS — E55.9 VITAMIN D DEFICIENCY DISEASE: ICD-10-CM

## 2020-02-25 DIAGNOSIS — K92.2 GASTROINTESTINAL HEMORRHAGE, UNSPECIFIED GASTROINTESTINAL HEMORRHAGE TYPE: ICD-10-CM

## 2020-02-25 DIAGNOSIS — I25.2 HISTORY OF MI (MYOCARDIAL INFARCTION): ICD-10-CM

## 2020-02-25 DIAGNOSIS — J44.9 CHRONIC OBSTRUCTIVE PULMONARY DISEASE, UNSPECIFIED COPD TYPE (H): ICD-10-CM

## 2020-02-25 DIAGNOSIS — N18.6 ESRD (END STAGE RENAL DISEASE) ON DIALYSIS (H): ICD-10-CM

## 2020-02-25 DIAGNOSIS — E78.5 HYPERLIPIDEMIA LDL GOAL <100: ICD-10-CM

## 2020-02-25 DIAGNOSIS — E11.22 TYPE 2 DIABETES MELLITUS WITH CHRONIC KIDNEY DISEASE, WITHOUT LONG-TERM CURRENT USE OF INSULIN, UNSPECIFIED CKD STAGE (H): ICD-10-CM

## 2020-02-25 DIAGNOSIS — Z99.2 ESRD (END STAGE RENAL DISEASE) ON DIALYSIS (H): ICD-10-CM

## 2020-02-25 DIAGNOSIS — L85.3 DRY SKIN: ICD-10-CM

## 2020-02-25 DIAGNOSIS — N18.4 CKD (CHRONIC KIDNEY DISEASE) STAGE 4, GFR 15-29 ML/MIN (H): ICD-10-CM

## 2020-02-25 DIAGNOSIS — F03.90 DEMENTIA WITHOUT BEHAVIORAL DISTURBANCE, UNSPECIFIED DEMENTIA TYPE: Primary | ICD-10-CM

## 2020-02-25 DIAGNOSIS — E11.49 TYPE II OR UNSPECIFIED TYPE DIABETES MELLITUS WITH NEUROLOGICAL MANIFESTATIONS, NOT STATED AS UNCONTROLLED(250.60) (H): ICD-10-CM

## 2020-02-25 DIAGNOSIS — K59.01 SLOW TRANSIT CONSTIPATION: ICD-10-CM

## 2020-02-25 DIAGNOSIS — G89.29 CHRONIC LOW BACK PAIN WITHOUT SCIATICA, UNSPECIFIED BACK PAIN LATERALITY: ICD-10-CM

## 2020-02-25 DIAGNOSIS — I95.9 TRANSIENT HYPOTENSION: ICD-10-CM

## 2020-02-25 DIAGNOSIS — J44.1 COPD EXACERBATION (H): ICD-10-CM

## 2020-02-25 DIAGNOSIS — N18.6 END STAGE RENAL DISEASE (H): ICD-10-CM

## 2020-02-25 DIAGNOSIS — I10 ESSENTIAL HYPERTENSION, BENIGN: ICD-10-CM

## 2020-02-25 DIAGNOSIS — Z99.2 ENCOUNTER FOR EXTRACORPOREAL DIALYSIS (H): ICD-10-CM

## 2020-02-25 DIAGNOSIS — J42 CHRONIC BRONCHITIS, UNSPECIFIED CHRONIC BRONCHITIS TYPE (H): ICD-10-CM

## 2020-02-25 DIAGNOSIS — I95.3 HEMODIALYSIS-ASSOCIATED HYPOTENSION: ICD-10-CM

## 2020-02-25 DIAGNOSIS — J43.9 PULMONARY EMPHYSEMA, UNSPECIFIED EMPHYSEMA TYPE (H): ICD-10-CM

## 2020-02-25 DIAGNOSIS — I12.0 MALIGNANT HYPERTENSIVE KIDNEY DISEASE WITH CHRONIC KIDNEY DISEASE STAGE V OR END STAGE RENAL DISEASE (H): ICD-10-CM

## 2020-02-25 DIAGNOSIS — E03.9 HYPOTHYROIDISM, UNSPECIFIED TYPE: ICD-10-CM

## 2020-02-25 DIAGNOSIS — M54.50 CHRONIC LOW BACK PAIN WITHOUT SCIATICA, UNSPECIFIED BACK PAIN LATERALITY: ICD-10-CM

## 2020-02-25 LAB
ABO + RH BLD: NORMAL
ABO + RH BLD: NORMAL
ALBUMIN SERPL-MCNC: 3 G/DL (ref 3.4–5)
ALBUMIN UR-MCNC: 300 MG/DL
ALP SERPL-CCNC: 225 U/L (ref 40–150)
ALT SERPL W P-5'-P-CCNC: 50 U/L (ref 0–50)
AMPHETAMINES UR QL SCN: NEGATIVE
ANION GAP SERPL CALCULATED.3IONS-SCNC: 4 MMOL/L (ref 3–14)
ANION GAP SERPL CALCULATED.3IONS-SCNC: 5 MMOL/L (ref 3–14)
APPEARANCE UR: CLEAR
AST SERPL W P-5'-P-CCNC: 34 U/L (ref 0–45)
BARBITURATES UR QL: NEGATIVE
BASOPHILS # BLD AUTO: 0.1 10E9/L (ref 0–0.2)
BASOPHILS # BLD AUTO: 0.1 10E9/L (ref 0–0.2)
BASOPHILS NFR BLD AUTO: 1.2 %
BASOPHILS NFR BLD AUTO: 1.3 %
BENZODIAZ UR QL: NEGATIVE
BILIRUB SERPL-MCNC: 0.3 MG/DL (ref 0.2–1.3)
BILIRUB UR QL STRIP: NEGATIVE
BLD GP AB SCN SERPL QL: NORMAL
BLOOD BANK CMNT PATIENT-IMP: NORMAL
BUN SERPL-MCNC: 56 MG/DL (ref 7–30)
BUN SERPL-MCNC: 62 MG/DL (ref 7–30)
CA-I SERPL ISE-MCNC: 4.1 MG/DL (ref 4.4–5.2)
CALCIUM SERPL-MCNC: 7.7 MG/DL (ref 8.5–10.1)
CALCIUM SERPL-MCNC: 7.8 MG/DL (ref 8.5–10.1)
CANNABINOIDS UR QL SCN: POSITIVE
CHLORIDE SERPL-SCNC: 102 MMOL/L (ref 94–109)
CHLORIDE SERPL-SCNC: 105 MMOL/L (ref 94–109)
CO2 SERPL-SCNC: 28 MMOL/L (ref 20–32)
CO2 SERPL-SCNC: 29 MMOL/L (ref 20–32)
COCAINE UR QL: NEGATIVE
COLOR UR AUTO: ABNORMAL
CREAT SERPL-MCNC: 4.69 MG/DL (ref 0.52–1.04)
CREAT SERPL-MCNC: 4.89 MG/DL (ref 0.52–1.04)
DIFFERENTIAL METHOD BLD: ABNORMAL
DIFFERENTIAL METHOD BLD: ABNORMAL
EOSINOPHIL # BLD AUTO: 0.4 10E9/L (ref 0–0.7)
EOSINOPHIL # BLD AUTO: 0.5 10E9/L (ref 0–0.7)
EOSINOPHIL NFR BLD AUTO: 5 %
EOSINOPHIL NFR BLD AUTO: 6.3 %
ERYTHROCYTE [DISTWIDTH] IN BLOOD BY AUTOMATED COUNT: 16.8 % (ref 10–15)
ERYTHROCYTE [DISTWIDTH] IN BLOOD BY AUTOMATED COUNT: 16.8 % (ref 10–15)
ETHANOL UR QL SCN: NEGATIVE
FERRITIN SERPL-MCNC: 1088 NG/ML (ref 8–252)
FOLATE SERPL-MCNC: 22.9 NG/ML
FOLATE SERPL-MCNC: 24.1 NG/ML
GFR SERPL CREATININE-BSD FRML MDRD: 8 ML/MIN/{1.73_M2}
GFR SERPL CREATININE-BSD FRML MDRD: 9 ML/MIN/{1.73_M2}
GLUCOSE BLDC GLUCOMTR-MCNC: 101 MG/DL (ref 70–99)
GLUCOSE BLDC GLUCOMTR-MCNC: 163 MG/DL (ref 70–99)
GLUCOSE BLDC GLUCOMTR-MCNC: 203 MG/DL (ref 70–99)
GLUCOSE BLDC GLUCOMTR-MCNC: 230 MG/DL (ref 70–99)
GLUCOSE SERPL-MCNC: 155 MG/DL (ref 70–99)
GLUCOSE SERPL-MCNC: 98 MG/DL (ref 70–99)
GLUCOSE UR STRIP-MCNC: 70 MG/DL
HCT VFR BLD AUTO: 27.6 % (ref 35–47)
HCT VFR BLD AUTO: 29.5 % (ref 35–47)
HGB BLD-MCNC: 8.4 G/DL (ref 11.7–15.7)
HGB BLD-MCNC: 9 G/DL (ref 11.7–15.7)
HGB UR QL STRIP: NEGATIVE
IMM GRANULOCYTES # BLD: 0.1 10E9/L (ref 0–0.4)
IMM GRANULOCYTES # BLD: 0.1 10E9/L (ref 0–0.4)
IMM GRANULOCYTES NFR BLD: 0.8 %
IMM GRANULOCYTES NFR BLD: 0.9 %
INR PPP: 0.95 (ref 0.86–1.14)
INTERPRETATION ECG - MUSE: NORMAL
IRON SATN MFR SERPL: 20 % (ref 15–46)
IRON SERPL-MCNC: 47 UG/DL (ref 35–180)
KETONES UR STRIP-MCNC: NEGATIVE MG/DL
LACTATE BLD-SCNC: 1 MMOL/L (ref 0.7–2)
LEUKOCYTE ESTERASE UR QL STRIP: NEGATIVE
LYMPHOCYTES # BLD AUTO: 1.6 10E9/L (ref 0.8–5.3)
LYMPHOCYTES # BLD AUTO: 2 10E9/L (ref 0.8–5.3)
LYMPHOCYTES NFR BLD AUTO: 20.4 %
LYMPHOCYTES NFR BLD AUTO: 26.9 %
MCH RBC QN AUTO: 32.2 PG (ref 26.5–33)
MCH RBC QN AUTO: 32.3 PG (ref 26.5–33)
MCHC RBC AUTO-ENTMCNC: 30.4 G/DL (ref 31.5–36.5)
MCHC RBC AUTO-ENTMCNC: 30.5 G/DL (ref 31.5–36.5)
MCV RBC AUTO: 106 FL (ref 78–100)
MCV RBC AUTO: 106 FL (ref 78–100)
MONOCYTES # BLD AUTO: 0.6 10E9/L (ref 0–1.3)
MONOCYTES # BLD AUTO: 0.7 10E9/L (ref 0–1.3)
MONOCYTES NFR BLD AUTO: 8.2 %
MONOCYTES NFR BLD AUTO: 9.3 %
NEUTROPHILS # BLD AUTO: 4.2 10E9/L (ref 1.6–8.3)
NEUTROPHILS # BLD AUTO: 4.9 10E9/L (ref 1.6–8.3)
NEUTROPHILS NFR BLD AUTO: 56.5 %
NEUTROPHILS NFR BLD AUTO: 63.2 %
NITRATE UR QL: NEGATIVE
NRBC # BLD AUTO: 0 10*3/UL
NRBC # BLD AUTO: 0 10*3/UL
NRBC BLD AUTO-RTO: 0 /100
NRBC BLD AUTO-RTO: 0 /100
OPIATES UR QL SCN: NEGATIVE
PH UR STRIP: 8 PH (ref 5–7)
PLATELET # BLD AUTO: 346 10E9/L (ref 150–450)
PLATELET # BLD AUTO: 376 10E9/L (ref 150–450)
POTASSIUM SERPL-SCNC: 5.1 MMOL/L (ref 3.4–5.3)
POTASSIUM SERPL-SCNC: 6.7 MMOL/L (ref 3.4–5.3)
POTASSIUM SERPL-SCNC: 7 MMOL/L (ref 3.4–5.3)
POTASSIUM SERPL-SCNC: 7.2 MMOL/L (ref 3.4–5.3)
PROT SERPL-MCNC: 6.7 G/DL (ref 6.8–8.8)
RBC # BLD AUTO: 2.61 10E12/L (ref 3.8–5.2)
RBC # BLD AUTO: 2.79 10E12/L (ref 3.8–5.2)
RBC #/AREA URNS AUTO: 1 /HPF (ref 0–2)
RETICS # AUTO: 92.9 10E9/L (ref 25–95)
RETICS/RBC NFR AUTO: 3.3 % (ref 0.5–2)
SODIUM SERPL-SCNC: 137 MMOL/L (ref 133–144)
SODIUM SERPL-SCNC: 137 MMOL/L (ref 133–144)
SOURCE: ABNORMAL
SP GR UR STRIP: 1.01 (ref 1–1.03)
SPECIMEN EXP DATE BLD: NORMAL
SQUAMOUS #/AREA URNS AUTO: 6 /HPF (ref 0–1)
T4 FREE SERPL-MCNC: 0.43 NG/DL (ref 0.76–1.46)
T4 FREE SERPL-MCNC: 0.45 NG/DL (ref 0.76–1.46)
TIBC SERPL-MCNC: 235 UG/DL (ref 240–430)
TROPONIN I SERPL-MCNC: 0.02 UG/L (ref 0–0.04)
TSH SERPL DL<=0.005 MIU/L-ACNC: 58.6 MU/L (ref 0.4–4)
TSH SERPL DL<=0.005 MIU/L-ACNC: 63.2 MU/L (ref 0.4–4)
UROBILINOGEN UR STRIP-MCNC: NORMAL MG/DL (ref 0–2)
VIT B12 SERPL-MCNC: 380 PG/ML (ref 193–986)
VIT B12 SERPL-MCNC: 417 PG/ML (ref 193–986)
WBC # BLD AUTO: 7.4 10E9/L (ref 4–11)
WBC # BLD AUTO: 7.8 10E9/L (ref 4–11)
WBC #/AREA URNS AUTO: 1 /HPF (ref 0–5)

## 2020-02-25 PROCEDURE — C9113 INJ PANTOPRAZOLE SODIUM, VIA: HCPCS | Performed by: EMERGENCY MEDICINE

## 2020-02-25 PROCEDURE — 00000146 ZZHCL STATISTIC GLUCOSE BY METER IP

## 2020-02-25 PROCEDURE — 87086 URINE CULTURE/COLONY COUNT: CPT | Performed by: EMERGENCY MEDICINE

## 2020-02-25 PROCEDURE — 83550 IRON BINDING TEST: CPT | Performed by: EMERGENCY MEDICINE

## 2020-02-25 PROCEDURE — 83540 ASSAY OF IRON: CPT | Performed by: EMERGENCY MEDICINE

## 2020-02-25 PROCEDURE — 82728 ASSAY OF FERRITIN: CPT | Performed by: EMERGENCY MEDICINE

## 2020-02-25 PROCEDURE — 85610 PROTHROMBIN TIME: CPT | Performed by: EMERGENCY MEDICINE

## 2020-02-25 PROCEDURE — 96366 THER/PROPH/DIAG IV INF ADDON: CPT | Performed by: EMERGENCY MEDICINE

## 2020-02-25 PROCEDURE — 99223 1ST HOSP IP/OBS HIGH 75: CPT | Mod: AI | Performed by: INTERNAL MEDICINE

## 2020-02-25 PROCEDURE — 40000611 ZZHCL STATISTIC MORPHOLOGY W/INTERP HEMEPATH TC 85060: Performed by: EMERGENCY MEDICINE

## 2020-02-25 PROCEDURE — 87389 HIV-1 AG W/HIV-1&-2 AB AG IA: CPT | Performed by: EMERGENCY MEDICINE

## 2020-02-25 PROCEDURE — 86850 RBC ANTIBODY SCREEN: CPT | Performed by: EMERGENCY MEDICINE

## 2020-02-25 PROCEDURE — 93005 ELECTROCARDIOGRAM TRACING: CPT | Performed by: EMERGENCY MEDICINE

## 2020-02-25 PROCEDURE — 84443 ASSAY THYROID STIM HORMONE: CPT | Performed by: EMERGENCY MEDICINE

## 2020-02-25 PROCEDURE — 99285 EMERGENCY DEPT VISIT HI MDM: CPT | Mod: 25 | Performed by: EMERGENCY MEDICINE

## 2020-02-25 PROCEDURE — 25000125 ZZHC RX 250: Performed by: STUDENT IN AN ORGANIZED HEALTH CARE EDUCATION/TRAINING PROGRAM

## 2020-02-25 PROCEDURE — 84439 ASSAY OF FREE THYROXINE: CPT | Performed by: EMERGENCY MEDICINE

## 2020-02-25 PROCEDURE — 80307 DRUG TEST PRSMV CHEM ANLYZR: CPT | Performed by: EMERGENCY MEDICINE

## 2020-02-25 PROCEDURE — 85045 AUTOMATED RETICULOCYTE COUNT: CPT | Performed by: EMERGENCY MEDICINE

## 2020-02-25 PROCEDURE — 96367 TX/PROPH/DG ADDL SEQ IV INF: CPT | Performed by: EMERGENCY MEDICINE

## 2020-02-25 PROCEDURE — 93005 ELECTROCARDIOGRAM TRACING: CPT | Mod: 76 | Performed by: EMERGENCY MEDICINE

## 2020-02-25 PROCEDURE — 96375 TX/PRO/DX INJ NEW DRUG ADDON: CPT | Performed by: EMERGENCY MEDICINE

## 2020-02-25 PROCEDURE — 96361 HYDRATE IV INFUSION ADD-ON: CPT | Performed by: EMERGENCY MEDICINE

## 2020-02-25 PROCEDURE — 84132 ASSAY OF SERUM POTASSIUM: CPT | Performed by: STUDENT IN AN ORGANIZED HEALTH CARE EDUCATION/TRAINING PROGRAM

## 2020-02-25 PROCEDURE — 36415 COLL VENOUS BLD VENIPUNCTURE: CPT | Performed by: EMERGENCY MEDICINE

## 2020-02-25 PROCEDURE — 84132 ASSAY OF SERUM POTASSIUM: CPT | Performed by: INTERNAL MEDICINE

## 2020-02-25 PROCEDURE — 80053 COMPREHEN METABOLIC PANEL: CPT | Performed by: EMERGENCY MEDICINE

## 2020-02-25 PROCEDURE — 80320 DRUG SCREEN QUANTALCOHOLS: CPT | Performed by: EMERGENCY MEDICINE

## 2020-02-25 PROCEDURE — 71045 X-RAY EXAM CHEST 1 VIEW: CPT

## 2020-02-25 PROCEDURE — 25000132 ZZH RX MED GY IP 250 OP 250 PS 637: Performed by: STUDENT IN AN ORGANIZED HEALTH CARE EDUCATION/TRAINING PROGRAM

## 2020-02-25 PROCEDURE — 83921 ORGANIC ACID SINGLE QUANT: CPT | Performed by: EMERGENCY MEDICINE

## 2020-02-25 PROCEDURE — 81001 URINALYSIS AUTO W/SCOPE: CPT | Performed by: EMERGENCY MEDICINE

## 2020-02-25 PROCEDURE — 25000128 H RX IP 250 OP 636: Performed by: EMERGENCY MEDICINE

## 2020-02-25 PROCEDURE — 86780 TREPONEMA PALLIDUM: CPT | Performed by: EMERGENCY MEDICINE

## 2020-02-25 PROCEDURE — 96365 THER/PROPH/DIAG IV INF INIT: CPT | Performed by: EMERGENCY MEDICINE

## 2020-02-25 PROCEDURE — 25800030 ZZH RX IP 258 OP 636: Performed by: EMERGENCY MEDICINE

## 2020-02-25 PROCEDURE — 40000498 ZZHCL STATISTIC POTASSIUM ED POCT

## 2020-02-25 PROCEDURE — 40000501 ZZHCL STATISTIC HEMATOCRIT ED POCT

## 2020-02-25 PROCEDURE — 40000497 ZZHCL STATISTIC SODIUM ED POCT

## 2020-02-25 PROCEDURE — 12000001 ZZH R&B MED SURG/OB UMMC

## 2020-02-25 PROCEDURE — 25800025 ZZH RX 258: Performed by: STUDENT IN AN ORGANIZED HEALTH CARE EDUCATION/TRAINING PROGRAM

## 2020-02-25 PROCEDURE — 82330 ASSAY OF CALCIUM: CPT

## 2020-02-25 PROCEDURE — 40000502 ZZHCL STATISTIC GLUCOSE ED POCT

## 2020-02-25 PROCEDURE — 84484 ASSAY OF TROPONIN QUANT: CPT | Performed by: EMERGENCY MEDICINE

## 2020-02-25 PROCEDURE — 25000128 H RX IP 250 OP 636: Performed by: INTERNAL MEDICINE

## 2020-02-25 PROCEDURE — 80048 BASIC METABOLIC PNL TOTAL CA: CPT | Performed by: EMERGENCY MEDICINE

## 2020-02-25 PROCEDURE — 86900 BLOOD TYPING SEROLOGIC ABO: CPT | Performed by: EMERGENCY MEDICINE

## 2020-02-25 PROCEDURE — 83605 ASSAY OF LACTIC ACID: CPT | Performed by: EMERGENCY MEDICINE

## 2020-02-25 PROCEDURE — 82607 VITAMIN B-12: CPT | Performed by: EMERGENCY MEDICINE

## 2020-02-25 PROCEDURE — 93010 ELECTROCARDIOGRAM REPORT: CPT | Mod: Z6 | Performed by: EMERGENCY MEDICINE

## 2020-02-25 PROCEDURE — 36415 COLL VENOUS BLD VENIPUNCTURE: CPT | Performed by: INTERNAL MEDICINE

## 2020-02-25 PROCEDURE — 85025 COMPLETE CBC W/AUTO DIFF WBC: CPT | Performed by: EMERGENCY MEDICINE

## 2020-02-25 PROCEDURE — 82803 BLOOD GASES ANY COMBINATION: CPT

## 2020-02-25 PROCEDURE — 25800030 ZZH RX IP 258 OP 636: Performed by: INTERNAL MEDICINE

## 2020-02-25 PROCEDURE — 86901 BLOOD TYPING SEROLOGIC RH(D): CPT | Performed by: EMERGENCY MEDICINE

## 2020-02-25 PROCEDURE — 83540 ASSAY OF IRON: CPT | Performed by: STUDENT IN AN ORGANIZED HEALTH CARE EDUCATION/TRAINING PROGRAM

## 2020-02-25 PROCEDURE — 82330 ASSAY OF CALCIUM: CPT | Performed by: STUDENT IN AN ORGANIZED HEALTH CARE EDUCATION/TRAINING PROGRAM

## 2020-02-25 PROCEDURE — 82746 ASSAY OF FOLIC ACID SERUM: CPT | Performed by: EMERGENCY MEDICINE

## 2020-02-25 RX ORDER — DEXTROSE MONOHYDRATE 25 G/50ML
25-50 INJECTION, SOLUTION INTRAVENOUS
Status: DISCONTINUED | OUTPATIENT
Start: 2020-02-25 | End: 2020-02-26

## 2020-02-25 RX ORDER — VITAMIN B COMPLEX
2000 TABLET ORAL AT BEDTIME
Status: DISCONTINUED | OUTPATIENT
Start: 2020-02-25 | End: 2020-03-02 | Stop reason: HOSPADM

## 2020-02-25 RX ORDER — NICOTINE POLACRILEX 4 MG
15-30 LOZENGE BUCCAL
Status: DISCONTINUED | OUTPATIENT
Start: 2020-02-25 | End: 2020-03-02 | Stop reason: HOSPADM

## 2020-02-25 RX ORDER — DEXTROSE MONOHYDRATE 100 MG/ML
INJECTION, SOLUTION INTRAVENOUS CONTINUOUS
Status: DISCONTINUED | OUTPATIENT
Start: 2020-02-25 | End: 2020-03-02 | Stop reason: HOSPADM

## 2020-02-25 RX ORDER — LIDOCAINE 40 MG/G
CREAM TOPICAL
Status: DISCONTINUED | OUTPATIENT
Start: 2020-02-25 | End: 2020-03-02 | Stop reason: HOSPADM

## 2020-02-25 RX ORDER — NALOXONE HYDROCHLORIDE 0.4 MG/ML
.1-.4 INJECTION, SOLUTION INTRAMUSCULAR; INTRAVENOUS; SUBCUTANEOUS
Status: DISCONTINUED | OUTPATIENT
Start: 2020-02-25 | End: 2020-03-02 | Stop reason: HOSPADM

## 2020-02-25 RX ORDER — ALBUTEROL SULFATE 5 MG/ML
10 SOLUTION RESPIRATORY (INHALATION) ONCE
Status: COMPLETED | OUTPATIENT
Start: 2020-02-25 | End: 2020-02-25

## 2020-02-25 RX ORDER — DEXTROSE MONOHYDRATE 25 G/50ML
25 INJECTION, SOLUTION INTRAVENOUS ONCE
Status: COMPLETED | OUTPATIENT
Start: 2020-02-25 | End: 2020-02-25

## 2020-02-25 RX ORDER — TIOTROPIUM BROMIDE 18 UG/1
18 CAPSULE ORAL; RESPIRATORY (INHALATION) DAILY
Status: DISCONTINUED | OUTPATIENT
Start: 2020-02-25 | End: 2020-02-25

## 2020-02-25 RX ORDER — ATORVASTATIN CALCIUM 40 MG/1
40 TABLET, FILM COATED ORAL AT BEDTIME
Status: DISCONTINUED | OUTPATIENT
Start: 2020-02-25 | End: 2020-03-02 | Stop reason: HOSPADM

## 2020-02-25 RX ORDER — FUROSEMIDE 10 MG/ML
40 INJECTION INTRAMUSCULAR; INTRAVENOUS ONCE
Status: DISCONTINUED | OUTPATIENT
Start: 2020-02-25 | End: 2020-02-25

## 2020-02-25 RX ORDER — LIDOCAINE 4 G/G
3 PATCH TOPICAL
Status: DISCONTINUED | OUTPATIENT
Start: 2020-02-25 | End: 2020-02-26

## 2020-02-25 RX ORDER — MIDODRINE HYDROCHLORIDE 5 MG/1
10 TABLET ORAL DAILY PRN
Status: DISCONTINUED | OUTPATIENT
Start: 2020-02-25 | End: 2020-03-02 | Stop reason: HOSPADM

## 2020-02-25 RX ORDER — DEXTROSE MONOHYDRATE 25 G/50ML
25-50 INJECTION, SOLUTION INTRAVENOUS
Status: DISCONTINUED | OUTPATIENT
Start: 2020-02-25 | End: 2020-03-02 | Stop reason: HOSPADM

## 2020-02-25 RX ORDER — FUROSEMIDE 10 MG/ML
20 INJECTION INTRAMUSCULAR; INTRAVENOUS ONCE
Status: CANCELLED | OUTPATIENT
Start: 2020-02-25 | End: 2020-02-25

## 2020-02-25 RX ORDER — NICOTINE POLACRILEX 4 MG
15-30 LOZENGE BUCCAL
Status: DISCONTINUED | OUTPATIENT
Start: 2020-02-25 | End: 2020-02-26

## 2020-02-25 RX ADMIN — SODIUM CHLORIDE 80 MG: 9 INJECTION, SOLUTION INTRAVENOUS at 13:19

## 2020-02-25 RX ADMIN — SODIUM CHLORIDE 250 ML: 9 INJECTION, SOLUTION INTRAVENOUS at 10:00

## 2020-02-25 RX ADMIN — HUMAN INSULIN 7 UNITS: 100 INJECTION, SOLUTION SUBCUTANEOUS at 22:26

## 2020-02-25 RX ADMIN — DEXTROSE 50 % IN WATER (D50W) INTRAVENOUS SYRINGE 25 G: at 22:26

## 2020-02-25 RX ADMIN — CALCIUM GLUCONATE 2 G: 98 INJECTION, SOLUTION INTRAVENOUS at 22:44

## 2020-02-25 RX ADMIN — ALBUTEROL SULFATE 10 MG: 2.5 SOLUTION RESPIRATORY (INHALATION) at 22:22

## 2020-02-25 ASSESSMENT — MIFFLIN-ST. JEOR: SCORE: 1160.88

## 2020-02-25 ASSESSMENT — ENCOUNTER SYMPTOMS
CONFUSION: 1
FEVER: 0
FATIGUE: 1
COUGH: 0
APPETITE CHANGE: 0

## 2020-02-25 NOTE — ED NOTES
Bed: ED22  Expected date:   Expected time:   Means of arrival:   Comments:  [facility] [place] [facility]

## 2020-02-25 NOTE — PROGRESS NOTES
Social Work: Assessment with Discharge Plan    Patient Name:  Kim Anne  :  1945  Age:  74 year old  MRN:  [id number]  Risk/Complexity Score:     Completed assessment with:  Patient    Presenting Information   Reason for Referral:  Discharge plan  Date of Intake:  2020  Referral Source:  Physician  Decision Maker:  Patient at base  Alternate Decision Maker:  Per HCD, Both son Ki and Daughter April  Health Care Directive:  Copy in Chart  Living Situation:  House  Previous Functional Status:  Independent  Patient and family understanding of hospitalization:  The patient was only minimally engaged with the visit and did not express her understanding in the hospitalization.  Cultural/Language/Spiritual Considerations:  The patient identifies as Dru[place].   Adjustment to Illness:  The patient appears very passive in her engagement with illness management and the social work assessment.     Physical Health  Reason for Admission:    1. Gastrointestinal hemorrhage, unspecified gastrointestinal hemorrhage type    2. ESRD (end stage renal disease) on dialysis (H)    3. Transient hypotension      Services Needed/Recommended:  TCU or Home with homecare    Mental Health/Chemical Dependency  Diagnosis:  The patient denies any depression or anxiety at this time.   Support/Services in Place:  None  Services Needed/Recommended:  None    Support System  Significant relationship at present time:  The patient reports that she lives with her son and daughter and her grandchildren.   Family of origin is available for support:  Unknown  Other support available:  None  Gaps in support system:  The patient is very reserved in his answers. She denied any gaps but in previous notes, she had concern for food insecurity, transportation issues, etc.   Patient is caregiver to:  None     Provider Information   Primary Care Physician:  Ailyn Nieves   [phone]   Clinic:  [address] [place] [address] 602 /  [facility] [address]      :  [facility] Care Coordinator Nani Carrion [phone]    Financial   Income Source:  Social security  Financial Concerns:  None reported at this time  Insurance:    Payor/Plan Subscriber Name Rel Member # Group #   UC[employer] - [employer]* KIM CHAVEZ*  [id number]      [address]       Discharge Plan   Patient and family discharge goal:  At this time, the patient is agreeable to both TCU first then home care after TCU. She is hoping to go to [facility], as she has been there before.     In previous hospitalizations, the patient has been agreeable to TCU but declined at discharge.     Barriers to discharge:  Medical Clearance    Discharge Recommendations   Anticipated Disposition:  Facility:  TCU  Transportation Needs:  Medical:  Wheelchair  Name of Transportation Company and Phone:  TBD    Additional comments   Kim Chavez is a 74 year old female who presented to the ED from dialysis for hypotension. ED SW was asked to get involved to assist with discharge plan and to assess need for additional resources and supports in the home.     My first attempt at a visit was unsuccessful as patient was too tired to participate. She was dosing in an out of sleep. I came back later in the day and the patient, although tired, was more able to engage. She kept her answers short to questions and would not elaborate. She did give permission to call her daughter, April [phone]. I contacted April and left a voicemail to call ED SW back.      The patient reports that she is open to more services at home including additional home care or going to a TCU facility. She is hoping to go to [facility] if rehab is recommended.     ANA De Jesus. [facility].   Clinical , Emergency Department   [facility]  Pager: [phone] Mon-Sat 9 am - 9 pm  On-call/after hours pager [phone]
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
## 2020-02-25 NOTE — LETTER
Health Information Management Services               Recipient:  Komal Campos liaison        Sender:  ANA Valdez, [facility]    [facility]  Phone [phone]          Date: March 2, 2020  Patient Name:  Kim Anne  Routing Message:    Discharge orders        The documents accompanying this notice contain confidential information belonging to the sender.  This information is intended only for the use of the individual or entity named above.  The authorized recipient of this information is prohibited from disclosing this information to any other party and is required to destroy the information after its stated need has been fulfilled, unless otherwise required by state law.      If you are not the intended recipient, you are hereby notified that any disclosure, copy, distribution or action taken in reliance on the contents of these documents is strictly prohibited.  If you have received this document in error, please return it by fax to [phone] with a note on the cover sheet explaining why you are returning it (e.g. not your patient, not your provider, etc.).  If you need further assistance, please call [place]/[facility] Centralized Transcription at [phone].  Documents may also be returned by mail to [employer], , [facility] [address], [place], [place] [address].

## 2020-02-25 NOTE — LETTER
Health Information Management Services               Recipient:  Yana Brandon liaison        Sender:  ANA Valdez, [facility]    [facility]  Phone [phone]          Date: February 27, 2020  Patient Name:  Kim Anne  Routing Message:    Please consider for TCU placement at any location. Thanks!        The documents accompanying this notice contain confidential information belonging to the sender.  This information is intended only for the use of the individual or entity named above.  The authorized recipient of this information is prohibited from disclosing this information to any other party and is required to destroy the information after its stated need has been fulfilled, unless otherwise required by state law.      If you are not the intended recipient, you are hereby notified that any disclosure, copy, distribution or action taken in reliance on the contents of these documents is strictly prohibited.  If you have received this document in error, please return it by fax to [phone] with a note on the cover sheet explaining why you are returning it (e.g. not your patient, not your provider, etc.).  If you need further assistance, please call [place]/[facility] Centralized Transcription at [phone].  Documents may also be returned by mail to [employer], , [facility] [address], [place], [place] [address].

## 2020-02-25 NOTE — LETTER
Health Information Management Services               Recipient:  Cristiana Lucas        Sender:  ANA Valdez, [facility]    [facility]  Phone [phone]          Date: March 2, 2020  Patient Name:  Kim Anne  Routing Message:    AVS from hospitalization        The documents accompanying this notice contain confidential information belonging to the sender.  This information is intended only for the use of the individual or entity named above.  The authorized recipient of this information is prohibited from disclosing this information to any other party and is required to destroy the information after its stated need has been fulfilled, unless otherwise required by state law.      If you are not the intended recipient, you are hereby notified that any disclosure, copy, distribution or action taken in reliance on the contents of these documents is strictly prohibited.  If you have received this document in error, please return it by fax to [phone] with a note on the cover sheet explaining why you are returning it (e.g. not your patient, not your provider, etc.).  If you need further assistance, please call [place]/[facility] Centralized Transcription at [phone].  Documents may also be returned by mail to [employer], , [facility], [place], [place] [address].
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
## 2020-02-25 NOTE — LETTER
Health Information Management Services               Recipient:  Komal Campos liaison        Sender:  ANA Valdez, [facility]    [facility]  Phone [phone]          Date: February 28, 2020  Patient Name:  Kim Anne  Routing Message:    Please consider for TCU placement at [facility]. Pt ready for d/c. Thanks!      The documents accompanying this notice contain confidential information belonging to the sender.  This information is intended only for the use of the individual or entity named above.  The authorized recipient of this information is prohibited from disclosing this information to any other party and is required to destroy the information after its stated need has been fulfilled, unless otherwise required by state law.      If you are not the intended recipient, you are hereby notified that any disclosure, copy, distribution or action taken in reliance on the contents of these documents is strictly prohibited.  If you have received this document in error, please return it by fax to [phone] with a note on the cover sheet explaining why you are returning it (e.g. not your patient, not your provider, etc.).  If you need further assistance, please call [facility] Centralized Transcription at [phone].  Documents may also be returned by mail to [employer], , [facility] [address], [place], [place] [address].
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
## 2020-02-25 NOTE — ED TRIAGE NOTES
BIBA from [facility] dialysis where staff stated low bp about 1.5hrs into her dialysis tx.  Staff recorded 78/41  Patient is currently drowsy on arrival.  Received 400ml ns pta.    Type II DM
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
## 2020-02-26 ENCOUNTER — APPOINTMENT (OUTPATIENT)
Dept: OCCUPATIONAL THERAPY | Facility: CLINIC | Age: 75
DRG: 070 | End: 2020-02-26
Payer: COMMERCIAL

## 2020-02-26 ENCOUNTER — APPOINTMENT (OUTPATIENT)
Dept: PHYSICAL THERAPY | Facility: CLINIC | Age: 75
DRG: 070 | End: 2020-02-26
Payer: COMMERCIAL

## 2020-02-26 ENCOUNTER — PATIENT OUTREACH (OUTPATIENT)
Dept: GERIATRIC MEDICINE | Facility: CLINIC | Age: 75
End: 2020-02-26

## 2020-02-26 LAB
ALBUMIN SERPL-MCNC: 2.9 G/DL (ref 3.4–5)
ALP SERPL-CCNC: 213 U/L (ref 40–150)
ALT SERPL W P-5'-P-CCNC: 54 U/L (ref 0–50)
ANION GAP SERPL CALCULATED.3IONS-SCNC: 8 MMOL/L (ref 3–14)
AST SERPL W P-5'-P-CCNC: 37 U/L (ref 0–45)
BACTERIA SPEC CULT: NO GROWTH
BASOPHILS # BLD AUTO: 0.1 10E9/L (ref 0–0.2)
BASOPHILS NFR BLD AUTO: 1.4 %
BILIRUB SERPL-MCNC: 0.3 MG/DL (ref 0.2–1.3)
BUN SERPL-MCNC: 30 MG/DL (ref 7–30)
CALCIUM SERPL-MCNC: 8.8 MG/DL (ref 8.5–10.1)
CHLORIDE SERPL-SCNC: 106 MMOL/L (ref 94–109)
CK SERPL-CCNC: 62 U/L (ref 30–225)
CO2 SERPL-SCNC: 25 MMOL/L (ref 20–32)
COPATH REPORT: NORMAL
CREAT SERPL-MCNC: 3.21 MG/DL (ref 0.52–1.04)
DIFFERENTIAL METHOD BLD: ABNORMAL
EOSINOPHIL # BLD AUTO: 0.3 10E9/L (ref 0–0.7)
EOSINOPHIL NFR BLD AUTO: 5.3 %
ERYTHROCYTE [DISTWIDTH] IN BLOOD BY AUTOMATED COUNT: 17.1 % (ref 10–15)
GFR SERPL CREATININE-BSD FRML MDRD: 14 ML/MIN/{1.73_M2}
GLUCOSE BLDC GLUCOMTR-MCNC: 103 MG/DL (ref 70–99)
GLUCOSE BLDC GLUCOMTR-MCNC: 118 MG/DL (ref 70–99)
GLUCOSE BLDC GLUCOMTR-MCNC: 124 MG/DL (ref 70–99)
GLUCOSE BLDC GLUCOMTR-MCNC: 152 MG/DL (ref 70–99)
GLUCOSE BLDC GLUCOMTR-MCNC: 166 MG/DL (ref 70–99)
GLUCOSE SERPL-MCNC: 204 MG/DL (ref 70–99)
HCT VFR BLD AUTO: 27.8 % (ref 35–47)
HGB BLD-MCNC: 8.3 G/DL (ref 11.7–15.7)
HIV 1+2 AB+HIV1 P24 AG SERPL QL IA: NONREACTIVE
IMM GRANULOCYTES # BLD: 0.1 10E9/L (ref 0–0.4)
IMM GRANULOCYTES NFR BLD: 0.9 %
INTERPRETATION ECG - MUSE: NORMAL
INTERPRETATION ECG - MUSE: NORMAL
LYMPHOCYTES # BLD AUTO: 1.3 10E9/L (ref 0.8–5.3)
LYMPHOCYTES NFR BLD AUTO: 22.6 %
Lab: NORMAL
MCH RBC QN AUTO: 31.8 PG (ref 26.5–33)
MCHC RBC AUTO-ENTMCNC: 29.9 G/DL (ref 31.5–36.5)
MCV RBC AUTO: 107 FL (ref 78–100)
MONOCYTES # BLD AUTO: 0.5 10E9/L (ref 0–1.3)
MONOCYTES NFR BLD AUTO: 9 %
NEUTROPHILS # BLD AUTO: 3.6 10E9/L (ref 1.6–8.3)
NEUTROPHILS NFR BLD AUTO: 60.8 %
NRBC # BLD AUTO: 0 10*3/UL
NRBC BLD AUTO-RTO: 0 /100
PHOSPHATE SERPL-MCNC: 3.3 MG/DL (ref 2.5–4.5)
PLATELET # BLD AUTO: 352 10E9/L (ref 150–450)
POTASSIUM SERPL-SCNC: 4 MMOL/L (ref 3.4–5.3)
POTASSIUM SERPL-SCNC: 4.6 MMOL/L (ref 3.4–5.3)
POTASSIUM SERPL-SCNC: 4.6 MMOL/L (ref 3.4–5.3)
PROT SERPL-MCNC: 6.6 G/DL (ref 6.8–8.8)
RBC # BLD AUTO: 2.61 10E12/L (ref 3.8–5.2)
SODIUM SERPL-SCNC: 140 MMOL/L (ref 133–144)
SPECIMEN SOURCE: NORMAL
T PALLIDUM AB SER QL: NONREACTIVE
WBC # BLD AUTO: 5.9 10E9/L (ref 4–11)

## 2020-02-26 PROCEDURE — 36415 COLL VENOUS BLD VENIPUNCTURE: CPT | Performed by: STUDENT IN AN ORGANIZED HEALTH CARE EDUCATION/TRAINING PROGRAM

## 2020-02-26 PROCEDURE — 99233 SBSQ HOSP IP/OBS HIGH 50: CPT | Mod: GC | Performed by: INTERNAL MEDICINE

## 2020-02-26 PROCEDURE — 12000001 ZZH R&B MED SURG/OB UMMC

## 2020-02-26 PROCEDURE — 97162 PT EVAL MOD COMPLEX 30 MIN: CPT | Mod: GP

## 2020-02-26 PROCEDURE — 25000128 H RX IP 250 OP 636: Performed by: STUDENT IN AN ORGANIZED HEALTH CARE EDUCATION/TRAINING PROGRAM

## 2020-02-26 PROCEDURE — C9113 INJ PANTOPRAZOLE SODIUM, VIA: HCPCS | Performed by: STUDENT IN AN ORGANIZED HEALTH CARE EDUCATION/TRAINING PROGRAM

## 2020-02-26 PROCEDURE — 25000132 ZZH RX MED GY IP 250 OP 250 PS 637: Performed by: EMERGENCY MEDICINE

## 2020-02-26 PROCEDURE — 82550 ASSAY OF CK (CPK): CPT | Performed by: INTERNAL MEDICINE

## 2020-02-26 PROCEDURE — 25000132 ZZH RX MED GY IP 250 OP 250 PS 637: Performed by: STUDENT IN AN ORGANIZED HEALTH CARE EDUCATION/TRAINING PROGRAM

## 2020-02-26 PROCEDURE — 90937 HEMODIALYSIS REPEATED EVAL: CPT

## 2020-02-26 PROCEDURE — 25800030 ZZH RX IP 258 OP 636: Performed by: INTERNAL MEDICINE

## 2020-02-26 PROCEDURE — 5A1D70Z PERFORMANCE OF URINARY FILTRATION, INTERMITTENT, LESS THAN 6 HOURS PER DAY: ICD-10-PCS | Performed by: INTERNAL MEDICINE

## 2020-02-26 PROCEDURE — 97530 THERAPEUTIC ACTIVITIES: CPT | Mod: GP

## 2020-02-26 PROCEDURE — 63400005 ZZH RX 634: Performed by: INTERNAL MEDICINE

## 2020-02-26 PROCEDURE — 00000146 ZZHCL STATISTIC GLUCOSE BY METER IP

## 2020-02-26 PROCEDURE — 25000132 ZZH RX MED GY IP 250 OP 250 PS 637: Performed by: INTERNAL MEDICINE

## 2020-02-26 PROCEDURE — 40000556 ZZH STATISTIC PERIPHERAL IV START W US GUIDANCE

## 2020-02-26 PROCEDURE — 97535 SELF CARE MNGMENT TRAINING: CPT | Mod: GO | Performed by: OCCUPATIONAL THERAPIST

## 2020-02-26 PROCEDURE — 97165 OT EVAL LOW COMPLEX 30 MIN: CPT | Mod: GO | Performed by: OCCUPATIONAL THERAPIST

## 2020-02-26 PROCEDURE — 84100 ASSAY OF PHOSPHORUS: CPT | Performed by: INTERNAL MEDICINE

## 2020-02-26 PROCEDURE — 25000131 ZZH RX MED GY IP 250 OP 636 PS 637: Performed by: STUDENT IN AN ORGANIZED HEALTH CARE EDUCATION/TRAINING PROGRAM

## 2020-02-26 PROCEDURE — 80053 COMPREHEN METABOLIC PANEL: CPT | Performed by: INTERNAL MEDICINE

## 2020-02-26 PROCEDURE — 85025 COMPLETE CBC W/AUTO DIFF WBC: CPT | Performed by: STUDENT IN AN ORGANIZED HEALTH CARE EDUCATION/TRAINING PROGRAM

## 2020-02-26 PROCEDURE — 84132 ASSAY OF SERUM POTASSIUM: CPT | Performed by: STUDENT IN AN ORGANIZED HEALTH CARE EDUCATION/TRAINING PROGRAM

## 2020-02-26 RX ORDER — OXYCODONE HYDROCHLORIDE 5 MG/1
5 TABLET ORAL EVERY 8 HOURS PRN
Status: DISCONTINUED | OUTPATIENT
Start: 2020-02-26 | End: 2020-03-02 | Stop reason: HOSPADM

## 2020-02-26 RX ORDER — LIDOCAINE 4 G/G
3 PATCH TOPICAL
Status: DISCONTINUED | OUTPATIENT
Start: 2020-02-26 | End: 2020-03-02 | Stop reason: HOSPADM

## 2020-02-26 RX ORDER — FUROSEMIDE 40 MG
40 TABLET ORAL DAILY
Status: DISCONTINUED | OUTPATIENT
Start: 2020-02-27 | End: 2020-03-02 | Stop reason: HOSPADM

## 2020-02-26 RX ORDER — CALCIUM ACETATE 667 MG/1
667 CAPSULE ORAL
Status: DISCONTINUED | OUTPATIENT
Start: 2020-02-26 | End: 2020-02-26

## 2020-02-26 RX ORDER — PANTOPRAZOLE SODIUM 40 MG/1
40 TABLET, DELAYED RELEASE ORAL
Status: DISCONTINUED | OUTPATIENT
Start: 2020-02-26 | End: 2020-02-28

## 2020-02-26 RX ADMIN — PANTOPRAZOLE SODIUM 40 MG: 40 TABLET, DELAYED RELEASE ORAL at 19:29

## 2020-02-26 RX ADMIN — SODIUM CHLORIDE 250 ML: 9 INJECTION, SOLUTION INTRAVENOUS at 15:34

## 2020-02-26 RX ADMIN — SODIUM CHLORIDE 300 ML: 9 INJECTION, SOLUTION INTRAVENOUS at 00:22

## 2020-02-26 RX ADMIN — EPOETIN ALFA 10000 UNITS: 10000 SOLUTION INTRAVENOUS; SUBCUTANEOUS at 16:24

## 2020-02-26 RX ADMIN — LEVOTHYROXINE SODIUM 175 MCG: 0.03 TABLET ORAL at 08:15

## 2020-02-26 RX ADMIN — Medication: at 06:13

## 2020-02-26 RX ADMIN — PANTOPRAZOLE SODIUM 40 MG: 40 INJECTION, POWDER, FOR SOLUTION INTRAVENOUS at 08:16

## 2020-02-26 RX ADMIN — UMECLIDINIUM 1 PUFF: 62.5 AEROSOL, POWDER ORAL at 09:09

## 2020-02-26 RX ADMIN — SODIUM CHLORIDE 300 ML: 9 INJECTION, SOLUTION INTRAVENOUS at 15:34

## 2020-02-26 RX ADMIN — Medication: at 15:34

## 2020-02-26 RX ADMIN — OXYCODONE HYDROCHLORIDE 5 MG: 5 TABLET ORAL at 19:32

## 2020-02-26 RX ADMIN — MELATONIN 2000 UNITS: at 19:29

## 2020-02-26 RX ADMIN — LIDOCAINE 3 PATCH: 560 PATCH PERCUTANEOUS; TOPICAL; TRANSDERMAL at 02:21

## 2020-02-26 RX ADMIN — OXYCODONE HYDROCHLORIDE 5 MG: 5 TABLET ORAL at 08:21

## 2020-02-26 RX ADMIN — ATORVASTATIN CALCIUM 40 MG: 40 TABLET, FILM COATED ORAL at 19:28

## 2020-02-26 RX ADMIN — Medication 1 CAPSULE: at 08:15

## 2020-02-26 RX ADMIN — LIDOCAINE 3 PATCH: 560 PATCH PERCUTANEOUS; TOPICAL; TRANSDERMAL at 22:43

## 2020-02-26 RX ADMIN — INSULIN ASPART 1 UNITS: 100 INJECTION, SOLUTION INTRAVENOUS; SUBCUTANEOUS at 09:09

## 2020-02-26 RX ADMIN — SODIUM CHLORIDE 250 ML: 9 INJECTION, SOLUTION INTRAVENOUS at 00:22

## 2020-02-26 RX ADMIN — ATORVASTATIN CALCIUM 40 MG: 40 TABLET, FILM COATED ORAL at 02:20

## 2020-02-26 RX ADMIN — MIDODRINE HYDROCHLORIDE 10 MG: 5 TABLET ORAL at 15:11

## 2020-02-26 RX ADMIN — MELATONIN 2000 UNITS: at 02:20

## 2020-02-26 ASSESSMENT — ACTIVITIES OF DAILY LIVING (ADL)
ADLS_ACUITY_SCORE: 19
ADLS_ACUITY_SCORE: 18
ADLS_ACUITY_SCORE: 17

## 2020-02-26 ASSESSMENT — MIFFLIN-ST. JEOR
SCORE: 1200.88
SCORE: 1200.88
SCORE: 1181.88

## 2020-02-26 ASSESSMENT — PAIN DESCRIPTION - DESCRIPTORS
DESCRIPTORS: ACHING;DULL;OTHER (COMMENT)
DESCRIPTORS: ACHING

## 2020-02-26 NOTE — PROGRESS NOTES
Admitted/transferred from: ED  Reason for admission/transfer: 6B overflow, following HD run  Patient status upon admission/transfer: Pt is alert and oriented to person, place, and situation. VSS on RA. C/o generalized pain in back and BLE, baseline for pt. Ambulating with SBA. Pt report is confusing at times and unreliable as evidenced by frequent change in responses.   Interventions: Routine assessment. Labs collected.   Plan: Transfer out of ICU when bed becomes available.  2 RN skin assessment: completed by Sasha Mancia and Reshma Dodson  Result of skin assessment and interventions/actions: few scattered scabs and old healing wound tear on LUE.  Height, weight, drug calc weight: Done  Patient belongings (see Flowsheet - Adult Profile for details): remain with patient  MDRO education (if applicable): done

## 2020-02-26 NOTE — PROGRESS NOTES
[facility], [place]    Progress Note - [place] 3 Service        Date of Admission:  2/25/2020    Changes 02/26/2020  - HD emergently ON 2/25 for K ~ 7, 200 UF removed, repeat K 4.6 in AM  - Shifted, lasix, CaGluc given (a fib with bradycardia)  - Hgb stable   - Folate / B12 normal, retic index c/f hypoproliferation - Epogen  - HIV negative  - Treponema ab negative  - restart daily lasix per nephro    Assessment & Plan   Kim Anne is a 74 year old female admitted on 2/25/2020. She past medical history of end-stage renal disease secondary to diabetes on hemodialysis Tuesday Thursday Saturday complicated by peripheral neuropathy, COPD, hypertension, hypothyroidism, and multiple prior hospitalizations due to hypotension in the setting of hemodialysis runs with additional concerns about executive processing and potential vulnerable adult.     Encephalopathy - multifactorial   Patient presented alert and oriented to time but not place, confused on the day's events, not remembering recent hospitalizations.  Additionally prior hospitalization at [facility] noted a Garden of 9 out of 30.  At that time recommendation for TCU or in-home care was placed with patient and her family refusing.  Concerns for voluble adult raised at that time.  Additional given history of diabetes with end-stage renal disease as well as MI likely vascular component to dementia.  Further complicated in the setting of recent hypoperfusion.  And likely vulnerable underlying substrate. Elevated TSH on presentation. Hep B/C negative. HIV neg.     - TSH grossly elevated --> synthroid 175 mcg  - B12/Folate - wnl  - RPR/Treponema - neg  - HIV - neg  - cont PTA statin  - PT/OT      Hypotension in setting of HD (resolved)  Hyperkalemia with ESRD  Patient with history of recurrent hypotension in setting of HD runs, multiple hospitalizations/ED visits in past several months for same primary issue. Responsive to 400 mL bolus.  Per chart review appears attempts have been made to adjust dry weight to prevent from happening. May need further modification. Incomplete run 2/25 later with hyperkalemia responsive to shifting and emergent HD.     - Renal consult  - Remote Tele  - Hold PTA dilt (240 mg ER) in setting of recent hypoTN, and unknown last dose  - Restart PTA lasix     Acute on chronic anemia w/ CKD and ACD   C/f GIB  Patient presents with acute on chronic macrocytic anemia with rising MCV from prior baseline, 106 concern for nutritional causes as well as uremia in addition to chronic kidney disease (additionally possible reticulocytosis).  Positive guaiac in ED, however no history of symptoms c/f GIB (melena, hematemesis, hematochezia). Possible components of macro and micro anemia. Ferritin greatly elevated with low transferrin an nl iron sat, c/f anemia of chronic diease.     - PPI BID  - Smear and MMA pending (folate and B12 wnl)  - Epogen 40554 U per nephro 2/26      CKD on HD  - Renal consult  - nephrocaps  - dc Selevar per renal, start phosplow for hypocal  - Renal diet  - restart PTA lasix        DMII  Unclear if on insulin as outpt, conflicting results in chart of medications between [facility], [facility], and [facility]   - sliding scale insulin BG  target  - Pharmacy med rec     Pseudohypocalcemia  Ca++ corrects with pts low albumin         CAD / HTN / MI - PTA atorvastatin, hold ASA w/c/f GIB, hold lasix and dilt as above   COPD - PTA  Spriva   Hypothyroidism - PTA Synthroid 175 mcg  Neuropathy - minimize opioids as able topical         Diet: Renal Diet  Fluids: PO  DVT Prophylaxis: Pneumatic Compression Devices  Chaney Catheter: not present  Code Status: FULL    Disposition Plan   Expected discharge: 2 - 3 days, recommended to transitional care unit once dispo plan determined.  Entered: Flakito Pickens MD 02/26/2020, 6:39 AM     The patient was seen and discussed with Dr. Chew.    Flakito Pickens  Internal Medicine and  Pediatrics, PGY-1  P: [phone]    ______________________________________________________________________    Interval History   Patient's potassium noted to be approximately 7 overnight, requiring urgent hemodialysis with prior Lasix calcium and insulin with glucose administration.  Patient potassium following dialysis normalized.  Placed on telemetry without evidence of further arrhythmia overnight.    Patient oriented to place, alert and interactive.  No specific concerns.    Four-point review of system otherwise negative    Data reviewed today: I reviewed all medications, new labs and imaging results over the last 24 hours. I personally reviewed cbc, bmp.    Physical Exam   Vital Signs: Temp: 98.3  F (36.8  C) Temp src: Oral BP: 135/80 Pulse: 64 Heart Rate: 62 Resp: 16 SpO2: 98 % O2 Device: None (Room air)    Weight: 154 lbs 5.15 oz    Constitutional: Physiologic age greater than chronologic age  HEENT: Missing frontal teeth bilaterally trachea midline  CV: Regular rate and rhythm with murmur systolic appreciated  Pulm: Increased anterior posterior chest diameter with diminished breath sounds in all lung fields without evidence of rhonchi  Neuro: Alert, oriented to person place and time, fluent speech

## 2020-02-26 NOTE — PROGRESS NOTES
"Social Work Services Progress Note    Hospital Day: 2  Date of Initial Social Work Evaluation:  2/25/2020  Collaborated with:  , vulnerable adult reporting center    Data:  Pt is a 74 year-old woman known to this SW from prior admission. Upon chart review, SW noted multiple concerns regarding pt's safety at home, including from recent hospitalization at [facility] (see Care Everywhere for details)    Intervention:  ED SW completed assessment when pt arrived. This SW reviewed pt's chart and noted multiple significant concerns that pt is neglecting self and her family, with whom she lives, are not taking proper care of her.    Of note, pt indicated to ED SW that she would be open to TCU and wants to go to [facility] if rehab is necessary. However, upon chart review, this SW found that pt was recently globally denied by the [facility] due to a history of smoking behavior (see note by Stacia Cesar from 1/31/2020).    Per chart review, there is a pattern of pt agreeing to home services but then being lost to follow up. Per note of 2/7 by Nica Bonner, , home care was not able to establish care \"due to unsafe environment.\" See for details. Multiple [facility] reports have been [facility], including 2 in the past month. Due to evidence in medical record of reason for concern for pt safety, this SW also filed.     [facility] report filed: [id number].    Assessment:  Did not meet with pt for this intervention    Plan:    Anticipated Disposition:  TBD    Barriers to d/c plan:  Medical readiness, safe discharge plan    Follow Up:  SW will continue to remain available for patient and family support, discharge planning, other resources and support PRN.    Antonella Mooney, ANA, [facility]  ICU    [facility]   P: [phone]  Pager: [phone]         "

## 2020-02-26 NOTE — PROGRESS NOTES
[facility] Care Coordination Contact    3/5/20 spoke to member to follow up on return to home.  Member says she is fine.  Was unaware she was supposed to be at dialysis this morning.  States she has her medications. Was agreeable to home care RN and [employer] meal delivery.  Says she does not have a appt with per PCP. CC encouraged her to do this.  Member confirms she will call PCP and she will call the SWer at the TCU.    Call placed to [facility] who says they need new orders if the pt leave TCU AMA.  Call placed to PCP. To update of AMA leave at TCU.  Updated that we can not resume home care until pt sees PCP. Discussed case with RN Angelina.  Angelina will discuss with PCP.   Call placed to [employer] to resume meal delivery.         TRANSITIONS OF CARE (KERRIE) LOG   KERRIE tasks should be completed by the CC within one (1) business day of notification of each transition. Follow up contact with member is required after return to their usual care setting.  Note:  If CC finds out about the transitions fifteen (15) days or more after the member has returned to their usual care setting, no KERRIE log is needed. However, the CC should check in with the member to discuss the transition process, any changes needed to the care plan and document it in a case note.    Member Name:  Kim Whittington [facility] Name:  [facility]/Health Plan Member ID#:  [id number]   Product: [facility] Care Coordinator Contact:  Batool Mai RN, PHN Agency/County/Care System: [facility]   Transition Communication Actions from Care Management Contact   Transition #1   Notification Date: 2/25/20 Transition Date:   2/25/20 Transition From: Home     Is this the member s usual care setting?               yes Transition To: Hospital, [facility]   Transition Type:  Unplanned  Reason for Admission/Comments: Encephalopathy   2/25/20:   CC received notification that member is current in ED at [facility]. Epic notes reviewed. Left vm with Munira ENG  requesting a return call.   [facility] (recent referral placed from [facility] discharge on 2/21/20). Per [facility], they have not met with member, have a visit tentatively scheduled for 2/26/20, but have not confirmed with member.  Shared that member is current in ED at [facility].  Sakina Carrion RN, BC  Manager [facility] Care Coordinator   [phone]  (Fax)    2/26/20 Call from [facility] who is enquiring about members discharge.  Update that there is no pending discharge at this time.   Batool Mai RN, BSN, PHN  [facility]  [phone]  Fax: [phone]                   Shared CC contact info, care plan/services with receiving setting--Date completed: 2/25/20   Notified PCP of transition--Date completed:  2/25/20   via  EMR   Transition #2   Transition #3  (if applicable)   Notification Date: 3/2/20         Transition To:  Rehabiliation Facility[facility].   Transition Date: 3/2/20     Transition Type:    Planned  Notified PCP -- Date completed: 3/3/20              Shared CC contact info, care plan/services with receiving setting or, if applicable, home care agency--Date completed:  3/3/20  *Complete additional tasks below, if this transition is a return to usual care setting.      Comments:  3/2 Call placed to  TCU to discuss case, make SW aware of CC involvement in care and request invite to CC as they are scheduled.  Left VM with Dee Dee ENG requesting call back.   3/4/20 call placed to jennifer,  at TCU.  Spoke to SW and discussed case. Requested invite to CC and to be updated as needed regarding stay.  There is some concern member will leave AMA.             Notification Date: 3/5/20       Transition To:  Home  Transition Date:   3/4/20           Transition Type:    Unplanned  Notified PCP--Date completed: 3/5/20         Shared CC contact info, care plan/services with receiving setting or, if applicable, home care  agency--Date completed:  3/5/20      *Complete additional tasks below, if this transition is a return to usual care setting.      Comments:  Pratibha Garcia, called with update that member left TCU yesterday AMA.  Call placed to member to follow up.  Unable to reach member. Member did not show to Dialysis this morning.       *Complete tasks below when the member is discharging TO their usual care setting within one (1) business day of notification.  For situations where the Care Coordinator is notified of the discharge prior to the date of discharge, the Care Coordinator must follow up with the member or designated representative to confirm that discharge actually occurred and discuss required KERRIE tasks as outlined in the KERRIE Instructions.  (This includes situations where it may be a  new  usual care setting for the member. (i.e., a community member who decides upon permanent nursing home placement following hospitalization and rehab).    Date completed: 3/5/20  Communicated with member or their designated representative about the following:  care transition process; about changes to the member s health status; plan of care updates; education about transitions and how to prevent unplanned transitions/readmissions  Four Pillars for Optimal Transition:    Check  Yes  - if the member, family member and/or SNF/facility staff manages the following:    If  No  provide explanation in the comments section.          []  Yes     [x]  No     Does the member have a follow-up appointment scheduled with primary care or specialist? (Mental health hospitalizations--the appt. should be w/in 7 days)   []  Yes     [x]  No     Can the member manage their medications or is there a system in place to manage medications (e.g. home care set-up)?         []  Yes     [x]  No     Can the member verbalize warning signs and symptoms to watch for and how to respond?         []  Yes     [x]  No     Does the member use a Personal Health  Care Record?  Check  Yes  if visit summary, discharge summary, and/or healthcare summary are being used as a PHR.                                                                                                                                                                                    [] Yes      [x] No      Have you updated the member s care plan?  If  No  provide explanation in comments.   Comments: no CP update required.

## 2020-02-26 NOTE — PLAN OF CARE
Transferred to: Dialysis at 1530, transfer to  32-1 after dialysis, report called to  RN  Status at time of transfer: A/Ox3, confused statements at times, cooperative.  Up ad yohana in room with walker.  VSS on RA.  Tolerating diet with good appetite.    Belongings: walker delivered to , clothing and purse sent with patient to dialysis.    Chaney removed? (if no, why?): n/a  Chart and medications: tubed to   Family notified:  yes

## 2020-02-26 NOTE — PROGRESS NOTES
02/26/20 1028   Quick Adds   Type of Visit Initial Occupational Therapy Evaluation   Living Environment   Lives With child(dari), adult   Living Arrangements house   Home Accessibility stairs within home   Number of Stairs, Within Home, Primary   (15)   Transportation Anticipated family or friend will provide   Living Environment Comment Pt reports living with adult children, bedroom is upstairs   Self-Care   Usual Activity Tolerance moderate   Current Activity Tolerance fair   Regular Exercise No   Equipment Currently Used at Home glucometer;shower chair;walker, rolling   Functional Level   Ambulation 1-->assistive equipment   Transferring 1-->assistive equipment   Toileting 0-->independent   Bathing 1-->assistive equipment   Dressing 0-->independent   Eating 0-->independent   Communication 0-->understands/communicates without difficulty   Swallowing 0-->swallows foods/liquids without difficulty   Cognition 1 - attention or memory deficits   Fall history within last six months no   Which of the above functional risks had a recent onset or change? none   Prior Functional Level [facility] She reports that her children do all of the cooking, cleaning, and laundry.   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   Precautions/Limitations fall precautions   Cognitive Status Examination   Orientation person;place   Level of Consciousness alert;confused   Visual Perception   Visual Perception Wears glasses;No deficits were identified   Sensory Examination   Sensory Comments Pt denied deficits  "  Pain Assessment   Patient Currently in Pain No   Integumentary/Edema   Integumentary/Edema no deficits were identifed   Posture   Posture Comments WNL   Range of Motion (ROM)   ROM Comment WNL   Strength   Strength Comments WFL   Muscle Tone Assessment   Muscle Tone Comments WNL   Coordination   Upper Extremity Coordination No deficits were identified   Transfer Skill: Sit to Stand   Level of [place]: Sit/Stand stand-by assist   Lower Body Dressing   Level of [place]: Dress Lower Body stand-by assist   Toileting   Level of [place]: Toilet stand-by assist   Grooming   Level of [place]: Grooming stand-by assist   Activities of Daily Living Analysis   Impairments Contributing to Impaired Activities of Daily Living cognition impaired  (deconditioning)   General Therapy Interventions   Planned Therapy Interventions ADL retraining;progressive activity/exercise;home program guidelines   Clinical Impression   Criteria for Skilled Therapeutic Interventions Met yes, treatment indicated   OT Diagnosis decreased ADL I, tolerance and safety   Influenced by the following impairments cognition, deconditioning   Assessment of Occupational Performance 1-3 Performance Deficits   Identified Performance Deficits home mgmt, leisure   Clinical Decision Making (Complexity) Low complexity   Therapy Frequency 5x/week   Predicted Duration of Therapy Intervention (days/wks) 3/4/20   Anticipated Equipment Needs at Discharge   (TBD)   Anticipated Discharge Disposition Transitional Care Facility   Risks and Benefits of Treatment have been explained. Yes   Patient, Family & other staff in agreement with plan of care Yes   Clinical Impression Comments Pt may benefit from skilled OT to help increase ADL I and tolerance   [facility] AM-PAC  \"6 Clicks\" Daily Activity Inpatient Short Form   1. Putting on and taking off regular lower body clothing? 3 - A Little   2. Bathing (including washing, rinsing, drying)? 3 - A Little "   3. Toileting, which includes using toilet, bedpan or urinal? 4 - None   4. Putting on and taking off regular upper body clothing? 4 - None   5. Taking care of personal grooming such as brushing teeth? 3 - A Little   6. Eating meals? 4 - None   Daily Activity Raw Score (Score out of 24.Lower scores equate to lower levels of function) 21   Total Evaluation Time   Total Evaluation Time (Minutes) 10

## 2020-02-26 NOTE — PLAN OF CARE
Discharge Planner OT   Patient plan for discharge: home  Current status: Pt able to complete bed mobility with SBA. Pt donned ad socks and completed g/h with SBA. Pt scored 8/30 on the MoCA which indicates a severe cognitive impairment.   Barriers to return to prior living situation: cognition  Recommendations for discharge: TCU  Rationale for recommendations: to increase ADL I and safety.        Entered by: Nilson Rick 02/26/2020 11:58 AM

## 2020-02-26 NOTE — PHARMACY
Patient is being treated for hyperkalemia. A pharmacy consult was initiated to review the patient's medication list for possible causes of hyperkalemia.  No medications on this patient's profile are likely to have the side effect of hyperkalemia.     Continue current medication regimen.     Luci Norris, AnastasiiaD

## 2020-02-26 NOTE — PROGRESS NOTES
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     ESKD: dialysis dependent since 1/25/2020; dialyzes TTS at [facility] with Dr. Liz. Access: still using tunneled RIJ (though had AVF placed 11/22/19). Run time: 3 hrs. EDW: 62 kg. Run shortened on day of admission due to hypotension.   - Dialyzed emergently overnight for hyperkalemia; will plan another short run today and attempt some gentle UF  - Appreciate SW looking into concerns for vulnerable adult and generally requiring more assistance with medication management    Hyperkalemia: was shifted and dialyzed; has been running on high side at dialysis, likely dietary and shortened HD run; was likely at ok level upon presentation to ED as she came right from HD unit and recent potassium diffusion on dialysis; upon recheck later in evening it was much higher.  - Dialyzed 2 hrs emergently overnight     BP: currently 130-140's s/p fluids; PTA diltiazem 240 mg qday (appears to have hx of SVT), Lasix 40 mg qday  - Unlikely to be cardiac related, echo 1/2720 with EF 60%  - Likely will require higher EDW to avoid hypotension on HD  - Continue pre HD midodrine 10 mg  - BP meds (dilt and lasix) should be held before HD  - Hypothyroidism with pt likely not taking her levothyroxine     Volume: EDW 62 kg; O2 96% on RA; mild pulm edema on CXR; appears to have high IDWG making it difficult to stay at EDW. PTA on Lasix 40 mg qday  - Current weight 70 kg  - Would restart daily lasix (will hopefully reduce amount of UF needed on dialysis)  - Discussed with primary nephrologist, Dr. Liz, who had recently increased EDW again due to hypotension (will discuss and re-establish)  - UF goal 1 kg today      Anemia: hgb 8.3 g/dL,  "decreased likely dilutional given fluids; recent labs with hgb 10's, IS 16, ferritin 907; on Mircera/venofer protocol at outpt unit  - Will give epogen 10,000 units today     BMD: iCa 4.1, phos 3.3; recent ; PO calcitriol 0.5 mcg TTS, sevelamer 1 tab tid WM  - Continue PO calcitriol  - Hold phos binders for now; when resumed, should be on Phoslo instead of sevelemer given hypocalcemia    Recommendations were communicated to primary team via this note       Beulah Hutchins, PA -C  [phone]    Interval History :   Dialyzed overnight for hyperkalemia. Hypothyroid with pt likely not taking synthroid, now restarted. BP's much improved. Will attempt gentle UF today. Pt was seen bedside, seemingly not very concerned or engaged. Will have cognitive assessment today. Denies n/v, CP, chills. Says she is not SOB. When asked if she'd been up to walk or exert herself, she said no, but then RN said she'd walked with PT. Unsure how O2 held up while walking.    Review of Systems:   4 point ROS neg other than as noted above    Physical Exam:   I/O last 3 completed shifts:  In: 350 [IV Piggyback:350]  Out: 200 [Urine:200]   BP (!) 144/77 (BP Location: Right leg)   Pulse 64   Temp 97.7  F (36.5  C) (Oral)   Resp 16   Ht 1.651 m (5' 5\")   Wt 70 kg (154 lb 5.2 oz)   SpO2 97%   BMI 25.68 kg/m       GENERAL APPEARANCE: alert, NAD  EYES:  no scleral icterus, pupils equal  HENT: mouth without ulcers or lesions  PULM: diminished  CV: regular rhythm, normal rate     -edema trace peripheral  GI: soft   INTEGUMENT: no cyanosis, no rash  NEURO: flat affect  Access tunneled RIJ, maturing AVF    Labs:   All labs reviewed by me  Electrolytes/Renal -   Recent Labs   Lab Test 02/26/20  0454 02/26/20  0225 02/25/20  2239  02/25/20  1823 02/25/20  0955  01/29/20  0003  01/27/20  0420  01/26/20  1612     --   --   --  137 137   < >  --    < > 137   < > 135   POTASSIUM 4.6  4.6 4.0 7.0*   < > 7.2* 5.1   < > 4.2   < > 4.3   < > 5.3 "   CHLORIDE 106  --   --   --  105 102   < >  --    < > 106   < > 106   CO2 25  --   --   --  28 29   < >  --    < > 27   < > 25   BUN 30  --   --   --  62* 56*   < >  --    < > 15   < > 24   CR 3.21*  --   --   --  4.89* 4.69*   < >  --    < > 1.69*   < > 2.73*   *  --   --   --  98 155*   < >  --    < > 134*   < > 197*   FRANCES 8.8  --   --   --  7.7* 7.8*   < >  --    < > 9.3   < > 8.9   MAG  --   --   --   --   --   --   --  1.6  --  1.9  --  2.0   PHOS  --   --   --   --   --   --   --  2.5  --  2.8  --  3.2    < > = values in this interval not displayed.       CBC -   Recent Labs   Lab Test 02/26/20 0454 02/25/20  1507 02/25/20  0955   WBC 5.9 7.4 7.8   HGB 8.3* 9.0* 8.4*    376 346       LFTs -   Recent Labs   Lab Test 02/26/20  0454 02/25/20  0955 02/06/20  1151   ALKPHOS 213* 225* 215*   BILITOTAL 0.3 0.3 0.3   ALT 54* 50 40   AST 37 34 21   PROTTOTAL 6.6* 6.7* 6.9   ALBUMIN 2.9* 3.0* 3.3*       Iron Panel -   Recent Labs   Lab Test 02/25/20  0955 05/01/19  1320 02/16/18  0945   IRON 47 48 46   IRONSAT 20 34 28   LOU 1,088* 286* 134         Imaging:  Reviewed    Current Medications:    atorvastatin  40 mg Oral At Bedtime     insulin aspart  1-7 Units Subcutaneous TID AC     insulin aspart  1-5 Units Subcutaneous At Bedtime     levothyroxine  175 mcg Oral QAM AC     lidocaine  3 patch Transdermal Q24h    And     lidocaine   Transdermal Q8H     multivitamin RENAL  1 capsule Oral Daily     pantoprazole (PROTONIX) IV  40 mg Intravenous BID     sodium chloride (PF)  3 mL Intracatheter Q8H     umeclidinium  1 puff Inhalation Daily     vitamin D3  2,000 Units Oral At Bedtime       dextrose       JANE Gallardo, Rasheeda Liz, saw and evaluated this patient as part of a shared visit.  I have reviewed and discussed with the advanced practice provider their history, physical and plan.    I personally reviewed the vital signs, medications, labs and imaging.    My key history or  physical exam findings:  ESRD, hypotension; overnight with K up to >7, dialyzed emergently last night. Unclear what the cause is  Key management decisions made by me:  Dialysis today, work down her weight to determine ideal dry weight  - check CK  Rasheeda Liz  Date of Service (when I saw the patient): 2/26

## 2020-02-26 NOTE — PROGRESS NOTES
Attempted to contact, contact listed in demographics and unable to reach MsChen Sethi as a phone line was continually busy

## 2020-02-26 NOTE — PHARMACY-ADMISSION MEDICATION HISTORY
Admission medication history interview status for the 2/25/2020 admission is complete. See Epic admission navigator for allergy information, pharmacy, prior to admission medications and immunization status.     Medication history interview sources:  [place], [place] , pharmacy fill records    Changes made to PTA medication list (reason)  Added: None  Deleted: None  Changed: None    Additional medication history information (including reliability of information, actions taken by pharmacist):  - The patient is a poor historian of medications. She manages her own medications at home.  - I have serious doubts that she is taking any medication besides oxycodone at home based on her pharmacy fill records.  - The only fill records in the last 3 months are:        - 2/1/19: Miralax, docusate, renalcaps, oxycodone 5mg #15 for 5 days supply        - 1/6/19: oxycodone 5mg #90 for 30 days supply      Prior to Admission medications    Medication Sig Last Dose Taking? Auth Provider   albuterol (PROAIR HFA/PROVENTIL HFA/VENTOLIN HFA) 108 (90 Base) MCG/ACT inhaler Inhale 2 puffs into the lungs every 6 hours as needed for shortness of breath / dyspnea or wheezing   Ailyn Nieves APRN CNP   albuterol (PROVENTIL) (2.5 MG/3ML) 0.083% neb solution Take 1 vial (2.5 mg) by nebulization every 6 hours as needed for shortness of breath / dyspnea or wheezing   Ailyn Nieves APRN CNP   ammonium lactate (LAC-HYDRIN) 12 % external lotion Apply topically 2 times daily   Mireya Baldwin APRN CNP   ASPIR-LOW 81 MG EC tablet Take 1 tablet (81 mg) by mouth daily  Patient taking differently: Take 81 mg by mouth At Bedtime    Mireya Baldwin APRN CNP   atorvastatin (LIPITOR) 40 MG tablet Take 1 tablet (40 mg) by mouth At Bedtime   Cristina Esquivel MD   blood glucose monitoring (FREESTYLE LITE) test strip TEST BLOOD SUGAR TWO TIMES A DAY   Ailyn Nieves APRN CNP   blood glucose monitoring (FREESTYLE) lancets Test BS two times  daily as directed   Ailyn Nieves APRN CNP   Emollient (EUCERIN CALMING DAILY MOIST) CREA Externally apply 1 dose. topically daily   Ailyn Nieves APRN CNP   fluticasone-salmeterol (ADVAIR) 250-50 MCG/DOSE inhaler Inhale 1 puff into the lungs every 12 hours   Ailyn Nieves APRN CNP   levothyroxine (SYNTHROID/LEVOTHROID) 175 MCG tablet Take 1 tablet (175 mcg) by mouth every morning (before breakfast)   Cristina Esquivel MD   multivitamin RENAL (NEPHROCAPS/TRIPHROCAPS) 1 MG capsule Take 1 capsule by mouth daily   Cristina Esquivel MD   nitroGLYcerin (NITROSTAT) 0.4 MG sublingual tablet Place 1 tablet (0.4 mg) under the tongue every 5 minutes as needed for chest pain   Ailyn Neives APRN CNP   order for DME Equipment being ordered: Nebulizer   Joel, JUNIOR Escalante CNP   order for DME Equipment being ordered: Boost.   Ailyn Nieves APRN CNP   order for DME Equipment being ordered: cane   Mireya Baldwin APRN CNP   order for DME Equipment being ordered: Diabetic Shoes   Ailyn Nieves APRN CNP   order for DME Equipment being ordered: two pairs moderate knee high support hose   Ailyn Nieves APRN CNP   order for DME Equipment being ordered: Compression socks.  Strength:15-20 mmHg   Ailyn Nieves APRN CNP   order for DME One wheeled walker with seat and brakes and basket   Mai Babcock MD   oxyCODONE (ROXICODONE) 5 MG tablet Take 1 tablet (5 mg) by mouth every 8 hours as needed for moderate to severe pain   Cristina Esquivel MD   polyethylene glycol (MIRALAX/GLYCOLAX) packet Take 17 g by mouth daily as needed for constipation   Cristina Esquivel MD   tiotropium (SPIRIVA) 18 MCG inhaled capsule Inhale 1 capsule (18 mcg) into the lungs daily   Serge Funez MD   vitamin D3 (CHOLECALCIFEROL) 2000 units (50 mcg) tablet Take 1 tablet (2,000 Units) by mouth daily  Patient taking differently: Take 2,000 Units by mouth At Bedtime    Ailyn Nieves, APRN CNP         Medication  history completed by: Yonis Saldivar, PharmD

## 2020-02-26 NOTE — PROGRESS NOTES
[facility] Night Float Cross Cover    At 2100 called by nursing for potassium 6.7, on chart review blood pressures 70/40 reported but not validated prompting us to evaluate the patient in person at 2115.     Physical exam notable for patient awake and alert with warm extremities. Breathing comfortably on room air. With normal heart rate.    Discussed with nursing our concern regarding the hyperkalemia. Regarding blood pressure monitoring, was informed these readings were inaccurate and were not validated for that reason.  Ordered STAT EKG, calcium gluconate, insulin and lasix, as well as q2h K checks prior to leaving the emergency department.      On review of chart at 2200, medications for potassium shifting had not been given according to the MAR and EKG not performed. Called ED to alert nursing that these orders were in place and needed to be completed. I was contacted by nephrology shortly after this time to alert that patient will be dialyzed overnight, but requested that potassium shifting proceed in the meantime.

## 2020-02-26 NOTE — PROGRESS NOTES
02/26/20 0900   Quick Adds   Type of Visit Initial PT Evaluation   Living Environment   Lives With child(dari), adult   Living Arrangements house   Home Accessibility stairs within home   Number of Stairs, Within Home, Primary other (see comments)  (15)   Stair Railings, Within Home, Primary railings on both sides of stairs   Transportation Anticipated family or friend will provide   Living Environment Comment Pt reports that she lives in a multi-level home with her children. She reports she has 15 stairs to the upstairs floor.   Self-Care   Usual Activity Tolerance moderate   Current Activity Tolerance fair   Regular Exercise No   Activity/Exercise/Self-Care Comment Pt a questionable historian and difficult to get accurate responses   Functional Level Prior   Ambulation 1-->assistive equipment   Transferring 1-->assistive equipment   Fall history within last six months no   Prior Functional Level [facility] She reports that her children do all of the cooking, cleaning, and laundry.   General Information   Onset of Illness/Injury or Date of Surgery - Date 02/25/20   Patient/Family Goals Statement To return home   Pertinent History of Current Problem (include personal factors and/or comorbidities that impact the POC) Kim Anne is a 74 year old female admitted on 2/25/2020. She past medical history of end-stage renal disease secondary to diabetes on hemodialysis Tuesday Thursday Saturday complicated by peripheral neuropathy, COPD, hypertension, hypothyroidism, and multiple prior hospitalizations due to hypotension in the setting of hemodialysis runs with additional concerns about executive processing and potential vulnerable adult.   Precautions/Limitations fall precautions   General Observations Pt unsupported sitting in bed upon arrival   Cognitive Status Examination   Orientation orientation to person, place and time;other (see comments)  (Not oriented to why she was at the  hospital)   Level of Consciousness alert;agitated;confused   Follows Commands and Answers Questions 100% of the time   Personal Safety and Judgment impulsive   Memory impaired   Cognitive Comment Pt scored 9 on MoCA during previous hospitalization. Pt intermittently confused throughout session and became significantly agitated when attempting to take a BP. Pt demanded safety pins to hold pants up and when returning with safety pins was confused why we she needed them,     Pain Assessment   Patient Currently in Pain Yes, see Vital Sign flowsheet   Integumentary/Edema   Integumentary/Edema no deficits were identifed   Posture    Posture Forward head position;Protracted shoulders   Range of Motion (ROM)   ROM Comment B UE/LE grossly WFL   Strength   Strength Comments B UE/LE grossly WFL; able to amb so at least 3/5 strength of B LE   Bed Mobility   Bed Mobility Comments Pt IND with bed mobility   Transfer Skills   Transfer Comments STS with SBA and 4WW   Gait   Gait Comments Amb 30ftx2 with CGA + 4WW; limited by LE pain   Balance   Balance Comments IND with static sitting and standing balance with 4WW   Sensory Examination   Sensory Perception Comments Not formally tested   General Therapy Interventions   Planned Therapy Interventions gait training;strengthening;transfer training;home program guidelines;progressive activity/exercise   Clinical Impression   Criteria for Skilled Therapeutic Intervention yes, treatment indicated   PT Diagnosis Impaired functional mobility   Influenced by the following impairments Strength, pain, cognition, medical status   Functional limitations due to impairments Transfers, gait, stairs, endurance   Clinical Presentation Evolving/Changing   Clinical Presentation Rationale Based on PLOF, pt presentation, and clinical judgement   Clinical Decision Making (Complexity) Moderate complexity   Therapy Frequency 3x/week   Predicted Duration of Therapy Intervention (days/wks) 3 weeks  "  Anticipated Discharge Disposition Transitional Care Facility   Risk & Benefits of therapy have been explained Yes   Patient, Family & other staff in agreement with plan of care Yes   [facility] AM-PAC  \"6 Clicks\" V.2 Basic Mobility Inpatient Short Form   1. Turning from your back to your side while in a flat bed without using bedrails? 4 - None   2. Moving from lying on your back to sitting on the side of a flat bed without using bedrails? 4 - None   3. Moving to and from a bed to a chair (including a wheelchair)? 3 - A Little   4. Standing up from a chair using your arms (e.g., wheelchair, or bedside chair)? 3 - A Little   5. To walk in hospital room? 3 - A Little   6. Climbing 3-5 steps with a railing? 2 - A Lot   Basic Mobility Raw Score (Score out of 24.Lower scores equate to lower levels of function) 19   Total Evaluation Time   Total Evaluation Time (Minutes) 5     "

## 2020-02-26 NOTE — PLAN OF CARE
Discharge Planner PT   Patient plan for discharge: unknown  Current status: Pt oriented with mild confusion, on RA. SBA/CGA with bed mobility and STS transfers. Pt amb 30ft x 2 with 4WW + CGA; c/o B LE pain therefore amb stopped. All VSS.   Barriers to return to prior living situation: medical status, pain, decreased activity tolerance  Recommendations for discharge: TCU  Rationale for recommendations: clinical judgement, patient will continue to benefit from therapy services in order to increase functional mobility and independence       Entered by: Antonella Singh 02/26/2020 9:58 AM

## 2020-02-27 ENCOUNTER — APPOINTMENT (OUTPATIENT)
Dept: OCCUPATIONAL THERAPY | Facility: CLINIC | Age: 75
DRG: 070 | End: 2020-02-27
Payer: COMMERCIAL

## 2020-02-27 LAB
ANION GAP SERPL CALCULATED.3IONS-SCNC: 7 MMOL/L (ref 3–14)
BUN SERPL-MCNC: 20 MG/DL (ref 7–30)
CALCIUM SERPL-MCNC: 8.7 MG/DL (ref 8.5–10.1)
CHLORIDE SERPL-SCNC: 104 MMOL/L (ref 94–109)
CO2 SERPL-SCNC: 26 MMOL/L (ref 20–32)
CREAT SERPL-MCNC: 3.12 MG/DL (ref 0.52–1.04)
GFR SERPL CREATININE-BSD FRML MDRD: 14 ML/MIN/{1.73_M2}
GLUCOSE BLDC GLUCOMTR-MCNC: 126 MG/DL (ref 70–99)
GLUCOSE BLDC GLUCOMTR-MCNC: 128 MG/DL (ref 70–99)
GLUCOSE BLDC GLUCOMTR-MCNC: 131 MG/DL (ref 70–99)
GLUCOSE BLDC GLUCOMTR-MCNC: 147 MG/DL (ref 70–99)
GLUCOSE BLDC GLUCOMTR-MCNC: 80 MG/DL (ref 70–99)
GLUCOSE SERPL-MCNC: 141 MG/DL (ref 70–99)
POTASSIUM SERPL-SCNC: 4.7 MMOL/L (ref 3.4–5.3)
SODIUM SERPL-SCNC: 137 MMOL/L (ref 133–144)

## 2020-02-27 PROCEDURE — 25000128 H RX IP 250 OP 636

## 2020-02-27 PROCEDURE — 63400005 ZZH RX 634: Performed by: INTERNAL MEDICINE

## 2020-02-27 PROCEDURE — 25000132 ZZH RX MED GY IP 250 OP 250 PS 637: Performed by: EMERGENCY MEDICINE

## 2020-02-27 PROCEDURE — 25000132 ZZH RX MED GY IP 250 OP 250 PS 637: Performed by: INTERNAL MEDICINE

## 2020-02-27 PROCEDURE — 25800030 ZZH RX IP 258 OP 636: Performed by: INTERNAL MEDICINE

## 2020-02-27 PROCEDURE — 99232 SBSQ HOSP IP/OBS MODERATE 35: CPT | Mod: GC | Performed by: INTERNAL MEDICINE

## 2020-02-27 PROCEDURE — 25000132 ZZH RX MED GY IP 250 OP 250 PS 637: Performed by: STUDENT IN AN ORGANIZED HEALTH CARE EDUCATION/TRAINING PROGRAM

## 2020-02-27 PROCEDURE — 12000001 ZZH R&B MED SURG/OB UMMC

## 2020-02-27 PROCEDURE — 00000146 ZZHCL STATISTIC GLUCOSE BY METER IP

## 2020-02-27 PROCEDURE — 36415 COLL VENOUS BLD VENIPUNCTURE: CPT | Performed by: STUDENT IN AN ORGANIZED HEALTH CARE EDUCATION/TRAINING PROGRAM

## 2020-02-27 PROCEDURE — 80048 BASIC METABOLIC PNL TOTAL CA: CPT | Performed by: STUDENT IN AN ORGANIZED HEALTH CARE EDUCATION/TRAINING PROGRAM

## 2020-02-27 PROCEDURE — 97530 THERAPEUTIC ACTIVITIES: CPT | Mod: GO | Performed by: OCCUPATIONAL THERAPIST

## 2020-02-27 PROCEDURE — 90937 HEMODIALYSIS REPEATED EVAL: CPT

## 2020-02-27 PROCEDURE — 97535 SELF CARE MNGMENT TRAINING: CPT | Mod: GO | Performed by: OCCUPATIONAL THERAPIST

## 2020-02-27 RX ORDER — NICOTINE 21 MG/24HR
1 PATCH, TRANSDERMAL 24 HOURS TRANSDERMAL DAILY
Status: DISCONTINUED | OUTPATIENT
Start: 2020-02-28 | End: 2020-03-02 | Stop reason: HOSPADM

## 2020-02-27 RX ORDER — LABETALOL 20 MG/4 ML (5 MG/ML) INTRAVENOUS SYRINGE
10 DAILY PRN
Status: DISCONTINUED | OUTPATIENT
Start: 2020-02-27 | End: 2020-03-02 | Stop reason: HOSPADM

## 2020-02-27 RX ORDER — MIDODRINE HYDROCHLORIDE 5 MG/1
10 TABLET ORAL
Status: DISCONTINUED | OUTPATIENT
Start: 2020-02-27 | End: 2020-03-02 | Stop reason: HOSPADM

## 2020-02-27 RX ADMIN — MELATONIN 2000 UNITS: at 21:46

## 2020-02-27 RX ADMIN — PANTOPRAZOLE SODIUM 40 MG: 40 TABLET, DELAYED RELEASE ORAL at 16:07

## 2020-02-27 RX ADMIN — SODIUM CHLORIDE 300 ML: 9 INJECTION, SOLUTION INTRAVENOUS at 08:00

## 2020-02-27 RX ADMIN — OXYCODONE HYDROCHLORIDE 5 MG: 5 TABLET ORAL at 05:28

## 2020-02-27 RX ADMIN — MIDODRINE HYDROCHLORIDE 10 MG: 5 TABLET ORAL at 07:23

## 2020-02-27 RX ADMIN — EPOETIN ALFA 4000 UNITS: 10000 SOLUTION INTRAVENOUS; SUBCUTANEOUS at 10:03

## 2020-02-27 RX ADMIN — Medication: at 10:05

## 2020-02-27 RX ADMIN — OXYCODONE HYDROCHLORIDE 5 MG: 5 TABLET ORAL at 16:07

## 2020-02-27 RX ADMIN — Medication 1 CAPSULE: at 12:03

## 2020-02-27 RX ADMIN — FUROSEMIDE 40 MG: 40 TABLET ORAL at 12:04

## 2020-02-27 RX ADMIN — ATORVASTATIN CALCIUM 40 MG: 40 TABLET, FILM COATED ORAL at 21:46

## 2020-02-27 RX ADMIN — OXYCODONE HYDROCHLORIDE 5 MG: 5 TABLET ORAL at 23:49

## 2020-02-27 RX ADMIN — UMECLIDINIUM 1 PUFF: 62.5 AEROSOL, POWDER ORAL at 12:05

## 2020-02-27 RX ADMIN — LEVOTHYROXINE SODIUM 175 MCG: 0.03 TABLET ORAL at 12:02

## 2020-02-27 RX ADMIN — SODIUM CHLORIDE 250 ML: 9 INJECTION, SOLUTION INTRAVENOUS at 08:00

## 2020-02-27 RX ADMIN — Medication 1 MG: at 02:05

## 2020-02-27 RX ADMIN — PANTOPRAZOLE SODIUM 40 MG: 40 TABLET, DELAYED RELEASE ORAL at 12:03

## 2020-02-27 ASSESSMENT — ACTIVITIES OF DAILY LIVING (ADL)
ADLS_ACUITY_SCORE: 17

## 2020-02-27 ASSESSMENT — MIFFLIN-ST. JEOR
SCORE: 1195.79
SCORE: 1157.88
SCORE: 1182.88

## 2020-02-27 NOTE — PROGRESS NOTES
"Social Work Services Progress Note     Hospital Day: 3  Date of Initial Social Work Evaluation:  2/25/2020  Collaborated with: Primary team Maroon 3     Data:  Pt is a 74-year-old female admitted to [facility] 2/25/20 who presented for evaluation of hypotension. TCU is recommended at discharge. Pt is medically stable for discharge.     Intervention:  TCU is recommended at discharge. Pt has been confused and agitated; per discussion with primary team pt does not have capacity to make decisions. Primary team attempted to reach pt's family yesterday and was unable to reach them. Pt has healthcare directive on file naming her son Ki as primary agent and daughter April as secondary agent. The phone number listed for both is [phone]. Called number and left voicemail requesting call back from Ki or April.     Assessment:  Attempting to reach pt's healthcare agents     Plan:    Anticipated Disposition:  TBD    Barriers to d/c plan:  Discharge plan    Follow Up:  SW to follow for discharge planning     ANA Valdez, [facility]    [facility]  Pager [phone]  Phone [phone]    Addendum 3:36pm: Spoke with RN who states family was here stating they were going to take pt to various places (out for a smoke, to [employer], \"out for a ride\"). RN also states pt's purse missing after family visited. Called contact for Ki and April again. April answered and put Ki on speakerphone. Discussed TCU recommendation. Ki asked if pt in agreement with this. Explained that pt is disoriented and unable to decide for herself. Ki in agreement with pursuing TCU placement and would prefer facilities close to home. April states she drives pt to/from HD. April states she was here visiting pt earlier and thought pt was being discharge home today. Asked April if she knows what happened to pt's purse. April states she did not take pt's purse. She notes pt keeps " her purse under her pillows. Updated RN- RN looked under pillows and did not see it. RN states they can do another search for it. Family would like a call if purse found.    Faxed TCU referrals to [facility], [facility], and [facility].

## 2020-02-27 NOTE — PROGRESS NOTES
HEMODIALYSIS TREATMENT NOTE    Date: 2/26/2020  Time: 6:47 PM    Data:  Pre Wt: 70.0 kg (145 lb 8.1 oz)   Desired Wt: 68 kg   Post Wt: 68.1 kg (150 lb 2.1 oz)  Ultrafiltration - Post Run Net Total Removed: 1200 mL  Vascular Access Status: CVC  Dialyzer Rinse: Streaked, Light  Total Blood Volume Processed: 39.02 L   Total Dialysis (Treatment) Time: 2   Dialysate Bath: K 2, Ca 3  Heparin: None    Lab:   No    Assessment /Interventions:Assumed care from another nurse mid  treatment. Pt alert & orient x4, denies pain , calm & cooperative. 2 hours HD via RCVC  with good flow. . Pt completed kaela treatment without complication, CVC saline locked, hand off report given & pt send back in a stable condition.  SEE Flow sheet & MAR for details         Plan:    Cont with  Renal team.

## 2020-02-27 NOTE — PROGRESS NOTES
[facility], [place]    Progress Note - [facility] 3 Service        Date of Admission:  2/25/2020    Changes Today  - Extra dialysis run per nephro  - Have had several long discussions with pt and her two children (Ki and April) regarding dispo. They are all currently in agreement that pt will pursue TCU placement.     Assessment & Plan  Kim Anne is a 74 year old female admitted on 2/25/2020. She past medical history of end-stage renal disease secondary to diabetes on hemodialysis Tuesday Thursday Saturday complicated by peripheral neuropathy, COPD, hypertension, hypothyroidism, and multiple prior hospitalizations due to hypotension in the setting of hemodialysis runs with additional concerns about executive processing and potential vulnerable adult.    Dispo  PT and OT recommending TCU. Had discussion with pt and her daughter at bedside as well as with her son Ki on the phone. At this time everyone is in agreement that we will pursue TCU placement. There was some concern regarding need to go to the bank requiring pt to be discharged from the hospital, but at this time she is agreeing to stay. Pt is holdable if her family is unable to be reached due to her underlying cognitive deficit, her son is the POA (and daughter if the son is unreachable).  - Plan for TCU on discharge at this time  - Pt medically stable for discharge - FYI if family decides to discharge her home it would not be an AMA discharge     Acute vs Chronic Encephalopathy - multifactorial   Likely dementia  Patient presented alert and oriented to time but not place, confused on the day's events, not remembering recent hospitalizations.  Additionally prior [facility] noted a [place] of 9 out of 30.  At that time recommendation for TCU or in-home care was placed with patient and her family refusing.  Concerns for vulnerable adult raised at that time.  Additional given history of diabetes with  end-stage renal disease as well as MI likely vascular component to dementia.  Further complicated in the setting of recent hypoperfusion.  And likely vulnerable underlying substrate. Elevated TSH on presentation. Hep B/C negative. HIV neg. RPR negative. B12 and folate wnl  - cont PTA statin  - PT/OT      CKD on hemodialysis  Hypotension in setting of HD (resolved)  Hyperkalemia   Patient with history of recurrent hypotension in setting of HD runs, multiple hospitalizations/ED visits in past several months for same primary issue. Responsive to 400 mL bolus. Per chart review appears attempts have been made to adjust dry weight to prevent from happening. May need further modification. Incomplete run 2/25 later with hyperkalemia responsive to shifting and emergent HD.  - Renal consult  - Remote Tele  - Hold PTA dilt (240 mg ER) in setting of recent hypoTN, and unknown last dose  - Continue PTA lasix     Acute on chronic anemia w/ CKD and ACD   Patient presents with acute on chronic macrocytic anemia with rising MCV from prior baseline, 106 concern for nutritional causes as well as uremia in addition to chronic kidney disease (additionally possible reticulocytosis).  Positive guaiac in ED, however no history of symptoms c/f GIB (melena, hematemesis, hematochezia). Possible components of macro and micro anemia. Ferritin greatly elevated with low transferrin an nl iron sat, c/f anemia of chronic diease. B12 and folate wnl. No clinical symptoms concerning for GIB at this time. Smear with moderate anemia, no evidence of hemolysis and minimal reticulocytosis.  - PPI BID  - MMA pending (folate and B12 wnl)  - Epogen 83099 U per nephro 2/26      CKD on HD  - Renal consult  - nephrocaps  - dc Selevar per renal, start phosplow for hypocal  - Renal diet  - Continue PTA lasix     DMII  Unclear if on insulin as outpt, conflicting results in chart of medications between [facility], [facility], and [facility]   - sliding scale insulin BG   target  - Pharmacy med rec     Pseudohypocalcemia  Ca++ corrects with pts low albumin     CAD / HTN / MI - PTA atorvastatin, hold ASA w/c/f GIB, hold lasix and dilt as above   COPD - PTA  Spriva   Hypothyroidism - PTA Synthroid 175 mcg  Neuropathy - minimize opioids as able topical     Diet: Renal Diet  Fluids: PO  DVT Prophylaxis: Pneumatic Compression Devices  Chaney Catheter: not present  Code Status: FULL       Disposition Plan   Expected discharge: 2 - 3 days, recommended to transitional care unit once safe disposition plan/ TCU bed available.  Entered: Agnes Saleem MD 02/27/2020, 5:05 PM       The patient's care was discussed with the Attending Physician, Dr. Chew, Bedside Nurse, Patient and Patient's Family.    Agnes Saleem MD  [address], [place]  Please see sticky note for cross cover information  ______________________________________________________________________    Interval History   No acute events overnight. Nursing notes reviewed. Pt had extra run of dialysis this morning. Denies any lightheadedness, dizziness, chest pain, palpitations, SOB when seen after dialysis. Daughter and nephew are at bedside. Had long discussion about dispo (see above). Had BM today. Voiding independently.    4 point ROS negative except for above.    Data reviewed today: I reviewed all medications, new labs and imaging results over the last 24 hours.     Physical Exam   Vital Signs: Temp: 97.9  F (36.6  C) Temp src: Oral BP: (!) 148/69 Pulse: 64 Heart Rate: 64 Resp: 13 SpO2: 96 % O2 Device: None (Room air)    Weight: 144 lbs 13.48 oz     Constitutional: No acute distress. Appears slightly disheveled.   HEENT: Missing frontal teeth bilaterally trachea midline  CV: Regular rate and rhythm with murmur systolic appreciated  Pulm: Increased anterior posterior chest diameter with diminished breath sounds in all lung fields without evidence of rhonchi  Neuro: Alert,  oriented to person place and time, fluent speech. Does not always answer questions appropriately and appears forgetful at times.    Data   Recent Labs   Lab 02/27/20  0455 02/26/20  0454 02/26/20  0225  02/25/20  1823 02/25/20  1507 02/25/20  0955   WBC  --  5.9  --   --   --  7.4 7.8   HGB  --  8.3*  --   --   --  9.0* 8.4*   MCV  --  107*  --   --   --  106* 106*   PLT  --  352  --   --   --  376 346   INR  --   --   --   --   --   --  0.95    140  --   --  137  --  137   POTASSIUM 4.7 4.6  4.6 4.0   < > 7.2*  --  5.1   CHLORIDE 104 106  --   --  105  --  102   CO2 26 25  --   --  28  --  29   BUN 20 30  --   --  62*  --  56*   CR 3.12* 3.21*  --   --  4.89*  --  4.69*   ANIONGAP 7 8  --   --  4  --  5   FRANCES 8.7 8.8  --   --  7.7*  --  7.8*   * 204*  --   --  98  --  155*   ALBUMIN  --  2.9*  --   --   --   --  3.0*   PROTTOTAL  --  6.6*  --   --   --   --  6.7*   BILITOTAL  --  0.3  --   --   --   --  0.3   ALKPHOS  --  213*  --   --   --   --  225*   ALT  --  54*  --   --   --   --  50   AST  --  37  --   --   --   --  34   TROPI  --   --   --   --   --   --  0.021    < > = values in this interval not displayed.

## 2020-02-27 NOTE — PROGRESS NOTES
Pt A&O x3, disoriented to time. Suspected that this is baseline dementia. Anxious to leave, states that she wants to go home. Had HD this morning, see note. Fair appetite and denies nausea. Mild back pain reported, rest and heat pad ordered, currently no lidocaine patches in place. Pt walked around unit w OT. Pt on RA, lung sounds diminished in bases. No SOB or cough. At 1345 pt was in elevator leaving unit w her nephew AMA but RN and resident convinced her to stay. Family stated they will be back later. Chart states that safety is an issue, poss. volnerable adult.

## 2020-02-27 NOTE — PROGRESS NOTES
HEMODIALYSIS TREATMENT NOTE    Date: 2/27/2020  Time: 11:27 AM    Data:  Pre Wt: 68.2 kg (150 lb 5.7 oz)   Desired Wt: 65.2 kg   Post Wt: 65.7 kg (144 lb 13.5 oz)(estimated)  Weight change: 2.5 kg  Ultrafiltration - Post Run Net Total Removed (mL): 2500 mL  Vascular Access Status: CVC  Dialyzer Rinse: Streaked  Total Blood Volume Processed: 55.2 L   Total Dialysis (Treatment) Time: 3 hr   Dialysate Bath: K 2, Ca 3  Heparin: None    Lab:   No    Interventions:  Pt arrived for HD run, in process of challenging wt down. Pt did stand for wt in dialysis unit. Uneventful run, pt did void 150 ml clear yel urine. Also did cramp in legs when at net 2.5 kg off. UF goal matched at that time, noted -12.2% blood vol chg per crit line.   Pt slept most of run. EPO given, CVC capped with clear guard. Report called.    Assessment:  Stable HD likely at goal wt post run, 65.7 kg or 66 kg dressed.      Plan:    Next tx per renal

## 2020-02-27 NOTE — PLAN OF CARE
Pt confused, impulsive, and forgetful. Agitated. Does not use call light appropriately.  Up to BR with walker and SBA. Admitted with hyperkalemia, now resolved. Had dialysis this evening and will run again tomorrow. C/o back and leg pain. Oxycodone administered x1 with slight effect. Loose, productive cough. Sats >92% RA. Continue to assess orientation and provide for pt safety. Fall precautions, bed alarm engaged.

## 2020-02-27 NOTE — PLAN OF CARE
ASSUMED CARES: 9713-1454.  STATUS: Pt admitted 2/25 for Hypotension.   NEURO: Pt A/o x 1- Confused and becomes agitated with redirection. Pt continuously repeats the same questions/statements over and over despite them being addressed.   VS: VSS on RA- BP needs to be completed on either LE.   ACTIVITY: Up with A1 and walker.   PAIN: C/o pain in bilat legs- PRN Oxy given x 1   CARDIAC: RRR  RESP: Diminished LS  GI/: Voids spontaneously. Strict I & O. No BM during night.   DIET: CHO  SKIN: +Bruises. + Scabs.   LDA'S: R CVC. R Fistula. Pt pulled out PIV during night- Not replaced d/t pt increased agitation- No IB meds per MAR. Pt also removed all wristbands x 2 during night- replaced.   LABS: BG 80. Morning labs pending.   CHANGES THIS SHIFT: Pt became frustrated and C/o inability to sleep- PRN Melatonin given with little effectiveness- Can Melatonin dose be increased?   POC: Continue with POX. SW working with pt d[facility]t [facility]. Plan for discharge to TCU once safe dispo plan in place. Call light within reach. Bed alarm on for pt safety. Will continue to monitor.

## 2020-02-27 NOTE — PLAN OF CARE
Discharge Planner OT   Patient plan for discharge: home  Current status: patient seated EOB with walker in front of her, no bed alarm engaged.  Recommend use of alarm due to patient's significantly impaired cognition.  Sit <> stand and ambulation in room SBA with 4WW. SBA toilet transfer/task.  Pt impatient with obstacles in path, using poor problem-solving skills. Pt ambulated in hallway 50'x2, SBA with 4WW.  Bed alarm engaged at end of session.  Barriers to return to prior living situation: cognition, weakness, current level of assist  Recommendations for discharge: TCU  Rationale for recommendations: pt below baseline, and would benefit from continued therapy to increase safety and independence with ADLs.       Entered by: KATIE PARK 02/27/2020 3:31 PM

## 2020-02-27 NOTE — PROGRESS NOTES
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     ESKD: dialysis dependent since 1/25/2020; dialyzes TTS at [facility] with Dr. Liz. Access: still using tunneled RIJ (though had AVF placed 11/22/19). Run time: 3 hrs. EDW: 62 kg. Run shortened on day of admission due to hypotension.   - Dialysis today per TTS schedule  - Appreciate SW looking into concerns for vulnerable adult and generally requiring more assistance with medication management    Hyperkalemia, resolved: 7.0 night of admission, was shifted and dialyzed; has been running on high side at dialysis, likely dietary and shortened HD run; was likely at ok level upon presentation to ED as she came right from HD unit and recent potassium diffusion on dialysis; upon recheck later that evening it was much higher. CK wnl.     BP: currently 130-160's s/p fluids; PTA diltiazem 240 mg qday (appears to have hx of SVT), Lasix 40 mg qday  - Unlikely to be cardiac related, echo 1/2720 with EF 60%  - Likely will require higher EDW to avoid hypotension on HD  - Continue pre HD midodrine 10 mg and prn during  - BP meds (dilt and lasix) should be held before HD  - Hypothyroidism with pt likely not taking her levothyroxine     Volume: EDW 62 kg; O2 96% on RA; mild pulm edema on CXR; appears to have high IDWG making it difficult to stay at EDW. PTA on Lasix 40 mg qday. Pt reports ARAUZ.  - Current weight 68.2 kg, will attempt 3 kg UF today; pt cramped near end of run so will establish new EDW at 65.5 kg  - Continue daily lasix (will hopefully reduce amount of UF needed on dialysis)    Anemia: hgb 8.3 g/dL; recent labs with hgb 10's, IS 16, ferritin 907; on Mircera/venofer protocol at outpt unit  - Gave epogen 10,000 units  "yesterday and 4K today     BMD: iCa 4.1, phos 3.3; recent ; PO calcitriol 0.5 mcg TTS, sevelamer 1 tab tid WM  - Continue PO calcitriol  - Hold phos binders for now; when resumed, should be on Phoslo instead of sevelemer given hypocalcemia    Recommendations were communicated to primary team via this note       Beulah Hutchins, PA -C  [phone]    Interval History :   Seen on dialysis, stable run so far with pre HD midodrine and good BP's, attempting 3 kg UF. Pt reports ARAUZ. Denies n/v, CP, chills.    Review of Systems:   4 point ROS neg other than as noted above    Physical Exam:   I/O last 3 completed shifts:  In: 1660 [P.O.:1660]  Out: 1425 [Urine:225; Other:1200]   BP (!) 153/66   Pulse 63   Temp 98  F (36.7  C) (Oral)   Resp 13   Ht 1.651 m (5' 5\")   Wt 68.2 kg (150 lb 5.7 oz)   SpO2 93%   BMI 25.02 kg/m       GENERAL APPEARANCE: alert, NAD  EYES:  no scleral icterus, pupils equal  HENT: mouth without ulcers or lesions  PULM: diminished  CV: regular rhythm, normal rate     -edema trace peripheral  GI: soft   INTEGUMENT: no cyanosis, no rash  NEURO: flat affect  Access tunneled RIJ, maturing AVF    Labs:   All labs reviewed by me  Electrolytes/Renal -   Recent Labs   Lab Test 02/27/20  0455 02/26/20  0454 02/26/20  0225  02/25/20  1823  01/29/20  0003  01/27/20  0420  01/26/20  1612    140  --   --  137   < >  --    < > 137   < > 135   POTASSIUM 4.7 4.6  4.6 4.0   < > 7.2*   < > 4.2   < > 4.3   < > 5.3   CHLORIDE 104 106  --   --  105   < >  --    < > 106   < > 106   CO2 26 25  --   --  28   < >  --    < > 27   < > 25   BUN 20 30  --   --  62*   < >  --    < > 15   < > 24   CR 3.12* 3.21*  --   --  4.89*   < >  --    < > 1.69*   < > 2.73*   * 204*  --   --  98   < >  --    < > 134*   < > 197*   FRANCES 8.7 8.8  --   --  7.7*   < >  --    < > 9.3   < > 8.9   MAG  --   --   --   --   --   --  1.6  --  1.9  --  2.0   PHOS  --  3.3  --   --   --   --  2.5  --  2.8  --  3.2    < > = values in " this interval not displayed.       CBC -   Recent Labs   Lab Test 02/26/20  0454 02/25/20  1507 02/25/20  0955   WBC 5.9 7.4 7.8   HGB 8.3* 9.0* 8.4*    376 346       LFTs -   Recent Labs   Lab Test 02/26/20  0454 02/25/20  0955 02/06/20  1151   ALKPHOS 213* 225* 215*   BILITOTAL 0.3 0.3 0.3   ALT 54* 50 40   AST 37 34 21   PROTTOTAL 6.6* 6.7* 6.9   ALBUMIN 2.9* 3.0* 3.3*       Iron Panel -   Recent Labs   Lab Test 02/25/20  0955 05/01/19  1320 02/16/18  0945   IRON 47 48 46   IRONSAT 20 34 28   LOU 1,088* 286* 134         Imaging:  Reviewed    Current Medications:    sodium chloride 0.9%  250 mL Intravenous Once in dialysis     sodium chloride 0.9%  300 mL Hemodialysis Machine Once     atorvastatin  40 mg Oral At Bedtime     epoetin jacobo (EPOGEN,PROCRIT) inj ESRD  4,000 Units Intravenous Once in dialysis     furosemide  40 mg Oral Daily     gelatin absorbable  1 each Topical During Hemodialysis (from stock)     insulin aspart  1-7 Units Subcutaneous TID AC     insulin aspart  1-5 Units Subcutaneous At Bedtime     levothyroxine  175 mcg Oral QAM AC     lidocaine  3 patch Transdermal Q24h    And     lidocaine   Transdermal Q8H     multivitamin RENAL  1 capsule Oral Daily     - MEDICATION INSTRUCTIONS -   Does not apply Once     pantoprazole  40 mg Oral BID AC     sodium chloride (PF)  3 mL Intracatheter Q8H     sodium chloride (PF)  9 mL Intracatheter During Hemodialysis (from stock)     sodium chloride (PF)  9 mL Intracatheter During Hemodialysis (from stock)     umeclidinium  1 puff Inhalation Daily     vitamin D3  2,000 Units Oral At Bedtime       dextrose       JANE Gallardo, Rasheeda Liz, saw and evaluated this patient as part of a shared visit.  I have reviewed and discussed with the advanced practice provider their history, physical and plan.    I personally reviewed the vital signs, medications, labs and imaging.    My key history or physical exam findings:  ESRD, hyperkalemia,  poor cognition, hypotension  Key management decisions made by me:  Dialysis today, will continue to work towards a safe dialysis target weight    Rasheeda Cristian Liz  Date of Service (when I saw the patient): 2/27

## 2020-02-27 NOTE — SUMMARY OF CARE
Transferred from  and dialysis with belongings: glasses, winter jacket, brown slippers, white-blue sweater, black tank top, pink penguin pj pants, upper dentures, blue leather purse, black banded watch.     2/28: Previous charting showed concern about missing blue purse. This was located today in chair of pt's home walker. Pt also found smoking in room. Lighter was taken, labeled, and placed in medicine bin. Security will not take lighters.

## 2020-02-27 NOTE — PROGRESS NOTES
Pt transferred to unit 5B from dialysis at 1920. Pt c/o bilateral leg and back pain. Oxycodone administered. Pt confused to time, situation. Agitated. Pulled off wrist bands. Declined to allow replacement at this time. Ambulated to BR with SBA and walker. Denied dizziness. Bed alarm engaged. Pt has already set off bed alarm x1. Pt very forgetful. Asked if she could have tea after asking just a few minutes prior. Denied pain and then a few minutes later asked for pain medication for back and leg pain. Continue to provide for pt safety. Assess pain and effectiveness of interventions. Fall precautions. Provide reorientation.

## 2020-02-27 NOTE — PROGRESS NOTES
HEMODIALYSIS TREATMENT NOTE    Date: 2/25/2020  Time: 0230 AM    Data:  Pre Wt: 66 kg (145 lb 8.1 oz)   Desired Wt: 66 kg   Post Wt: 66 kg  Weight change: 0 kg  Ultrafiltration - Post Run Net Total Removed (mL): 0 mL  Vascular Access Status: patent  Dialyzer Rinse: Streaked, Light  Total Blood Volume Processed: 44.1 L Liters  Total Dialysis (Treatment) Time: 2 Hours    Lab:   No    Interventions:Assessment:  2 hour tx complete. K2/Ca3, . CVC utilized without complication. No fluid removed this tx per order. VSS throughout tx. CVC lumens saline locked and clear guard caps applied. Hand off report given to ED RN  Plan:    Per nephrology team.

## 2020-02-28 ENCOUNTER — APPOINTMENT (OUTPATIENT)
Dept: OCCUPATIONAL THERAPY | Facility: CLINIC | Age: 75
DRG: 070 | End: 2020-02-28
Payer: COMMERCIAL

## 2020-02-28 ENCOUNTER — APPOINTMENT (OUTPATIENT)
Dept: PHYSICAL THERAPY | Facility: CLINIC | Age: 75
DRG: 070 | End: 2020-02-28
Payer: COMMERCIAL

## 2020-02-28 LAB
ANION GAP SERPL CALCULATED.3IONS-SCNC: 6 MMOL/L (ref 3–14)
BUN SERPL-MCNC: 15 MG/DL (ref 7–30)
CA-I BLD-SCNC: 4.1 MG/DL (ref 4.4–5.2)
CALCIUM SERPL-MCNC: 8.5 MG/DL (ref 8.5–10.1)
CHLORIDE SERPL-SCNC: 101 MMOL/L (ref 94–109)
CO2 BLDCOV-SCNC: 28 MMOL/L (ref 21–28)
CO2 SERPL-SCNC: 27 MMOL/L (ref 20–32)
CREAT SERPL-MCNC: 3.32 MG/DL (ref 0.52–1.04)
ERYTHROCYTE [DISTWIDTH] IN BLOOD BY AUTOMATED COUNT: 16.8 % (ref 10–15)
GFR SERPL CREATININE-BSD FRML MDRD: 13 ML/MIN/{1.73_M2}
GLUCOSE BLD-MCNC: 94 MG/DL (ref 70–99)
GLUCOSE BLDC GLUCOMTR-MCNC: 128 MG/DL (ref 70–99)
GLUCOSE BLDC GLUCOMTR-MCNC: 137 MG/DL (ref 70–99)
GLUCOSE BLDC GLUCOMTR-MCNC: 138 MG/DL (ref 70–99)
GLUCOSE BLDC GLUCOMTR-MCNC: 151 MG/DL (ref 70–99)
GLUCOSE BLDC GLUCOMTR-MCNC: 75 MG/DL (ref 70–99)
GLUCOSE SERPL-MCNC: 187 MG/DL (ref 70–99)
HCT VFR BLD AUTO: 27.9 % (ref 35–47)
HCT VFR BLD CALC: 27 %PCV (ref 35–47)
HGB BLD CALC-MCNC: 9.2 G/DL (ref 11.7–15.7)
HGB BLD-MCNC: 8.5 G/DL (ref 11.7–15.7)
MCH RBC QN AUTO: 32 PG (ref 26.5–33)
MCHC RBC AUTO-ENTMCNC: 30.5 G/DL (ref 31.5–36.5)
MCV RBC AUTO: 105 FL (ref 78–100)
PCO2 BLDV: 45 MM HG (ref 40–50)
PH BLDV: 7.41 PH (ref 7.32–7.43)
PLATELET # BLD AUTO: 331 10E9/L (ref 150–450)
PO2 BLDV: 36 MM HG (ref 25–47)
POTASSIUM BLD-SCNC: 7.2 MMOL/L (ref 3.4–5.3)
POTASSIUM SERPL-SCNC: 4.9 MMOL/L (ref 3.4–5.3)
RBC # BLD AUTO: 2.66 10E12/L (ref 3.8–5.2)
SAO2 % BLDV FROM PO2: 68 %
SODIUM BLD-SCNC: 138 MMOL/L (ref 133–144)
SODIUM SERPL-SCNC: 135 MMOL/L (ref 133–144)
WBC # BLD AUTO: 4.6 10E9/L (ref 4–11)

## 2020-02-28 PROCEDURE — 25000131 ZZH RX MED GY IP 250 OP 636 PS 637: Performed by: INTERNAL MEDICINE

## 2020-02-28 PROCEDURE — 00000146 ZZHCL STATISTIC GLUCOSE BY METER IP

## 2020-02-28 PROCEDURE — 97530 THERAPEUTIC ACTIVITIES: CPT | Mod: GP

## 2020-02-28 PROCEDURE — 85027 COMPLETE CBC AUTOMATED: CPT | Performed by: INTERNAL MEDICINE

## 2020-02-28 PROCEDURE — 12000001 ZZH R&B MED SURG/OB UMMC

## 2020-02-28 PROCEDURE — 80048 BASIC METABOLIC PNL TOTAL CA: CPT | Performed by: INTERNAL MEDICINE

## 2020-02-28 PROCEDURE — 99232 SBSQ HOSP IP/OBS MODERATE 35: CPT | Mod: GC | Performed by: INTERNAL MEDICINE

## 2020-02-28 PROCEDURE — 25000132 ZZH RX MED GY IP 250 OP 250 PS 637: Performed by: STUDENT IN AN ORGANIZED HEALTH CARE EDUCATION/TRAINING PROGRAM

## 2020-02-28 PROCEDURE — 36415 COLL VENOUS BLD VENIPUNCTURE: CPT | Performed by: INTERNAL MEDICINE

## 2020-02-28 PROCEDURE — 25000132 ZZH RX MED GY IP 250 OP 250 PS 637: Performed by: INTERNAL MEDICINE

## 2020-02-28 PROCEDURE — 97535 SELF CARE MNGMENT TRAINING: CPT | Mod: GO | Performed by: OCCUPATIONAL THERAPIST

## 2020-02-28 RX ORDER — LEVOTHYROXINE SODIUM 175 UG/1
175 TABLET ORAL
Qty: 30 TABLET | Refills: 0 | DISCHARGE
Start: 2020-02-28 | End: 2020-03-16

## 2020-02-28 RX ORDER — ASPIRIN 81 MG/1
81 TABLET ORAL DAILY
Status: DISCONTINUED | OUTPATIENT
Start: 2020-02-28 | End: 2020-03-02 | Stop reason: HOSPADM

## 2020-02-28 RX ORDER — POLYETHYLENE GLYCOL 3350 17 G/17G
17 POWDER, FOR SOLUTION ORAL DAILY PRN
Status: DISCONTINUED | OUTPATIENT
Start: 2020-02-28 | End: 2020-03-02 | Stop reason: HOSPADM

## 2020-02-28 RX ORDER — ATORVASTATIN CALCIUM 40 MG/1
40 TABLET, FILM COATED ORAL AT BEDTIME
Qty: 30 TABLET | Refills: 0 | DISCHARGE
Start: 2020-02-28 | End: 2020-03-16

## 2020-02-28 RX ORDER — OXYCODONE HYDROCHLORIDE 5 MG/1
5 TABLET ORAL EVERY 8 HOURS PRN
Qty: 15 TABLET | Refills: 0 | Status: SHIPPED | OUTPATIENT
Start: 2020-02-28 | End: 2020-03-16

## 2020-02-28 RX ORDER — ASPIRIN 81 MG/1
81 TABLET ORAL AT BEDTIME
DISCHARGE
Start: 2020-02-28 | End: 2020-03-16

## 2020-02-28 RX ORDER — CHOLECALCIFEROL (VITAMIN D3) 50 MCG
2000 TABLET ORAL DAILY
Qty: 90 TABLET | Refills: 3 | Status: ON HOLD | DISCHARGE
Start: 2020-02-28 | End: 2022-01-01

## 2020-02-28 RX ORDER — TIOTROPIUM BROMIDE 18 UG/1
18 CAPSULE ORAL; RESPIRATORY (INHALATION) DAILY
Qty: 30 CAPSULE | Refills: 11 | Status: ON HOLD | DISCHARGE
Start: 2020-02-28 | End: 2022-01-01

## 2020-02-28 RX ORDER — POLYETHYLENE GLYCOL 3350 17 G/17G
17 POWDER, FOR SOLUTION ORAL DAILY PRN
Qty: 10 PACKET | Refills: 0 | Status: ON HOLD | DISCHARGE
Start: 2020-02-28 | End: 2022-01-01

## 2020-02-28 RX ORDER — NITROGLYCERIN 0.4 MG/1
0.4 TABLET SUBLINGUAL EVERY 5 MIN PRN
Qty: 25 TABLET | Refills: 0 | DISCHARGE
Start: 2020-02-28 | End: 2020-03-16

## 2020-02-28 RX ORDER — OXYCODONE HYDROCHLORIDE 5 MG/1
5 TABLET ORAL EVERY 8 HOURS PRN
Qty: 15 TABLET | Refills: 0 | Status: SHIPPED | DISCHARGE
Start: 2020-02-28 | End: 2020-02-28

## 2020-02-28 RX ORDER — AMOXICILLIN 250 MG
2 CAPSULE ORAL 2 TIMES DAILY PRN
Status: DISCONTINUED | OUTPATIENT
Start: 2020-02-28 | End: 2020-03-02 | Stop reason: HOSPADM

## 2020-02-28 RX ORDER — AMOXICILLIN 250 MG
1 CAPSULE ORAL 2 TIMES DAILY PRN
Status: DISCONTINUED | OUTPATIENT
Start: 2020-02-28 | End: 2020-03-02 | Stop reason: HOSPADM

## 2020-02-28 RX ORDER — AMMONIUM LACTATE 12 G/100G
LOTION TOPICAL 2 TIMES DAILY
Qty: 225 G | Refills: 0 | DISCHARGE
Start: 2020-02-28 | End: 2020-03-16

## 2020-02-28 RX ORDER — PETROLATUM,WHITE 41 %
1 OINTMENT (GRAM) TOPICAL DAILY
Qty: 1 TUBE | Refills: 12 | DISCHARGE
Start: 2020-02-28 | End: 2020-03-16

## 2020-02-28 RX ORDER — ALBUTEROL SULFATE 90 UG/1
2 AEROSOL, METERED RESPIRATORY (INHALATION) EVERY 6 HOURS PRN
Qty: 18 G | Refills: 3 | DISCHARGE
Start: 2020-02-28 | End: 2022-01-01

## 2020-02-28 RX ADMIN — INSULIN ASPART 1 UNITS: 100 INJECTION, SOLUTION INTRAVENOUS; SUBCUTANEOUS at 08:03

## 2020-02-28 RX ADMIN — LEVOTHYROXINE SODIUM 175 MCG: 0.03 TABLET ORAL at 08:01

## 2020-02-28 RX ADMIN — Medication: at 03:00

## 2020-02-28 RX ADMIN — ASPIRIN 81 MG: 81 TABLET, COATED ORAL at 12:09

## 2020-02-28 RX ADMIN — MELATONIN 2000 UNITS: at 21:36

## 2020-02-28 RX ADMIN — UMECLIDINIUM 1 PUFF: 62.5 AEROSOL, POWDER ORAL at 08:04

## 2020-02-28 RX ADMIN — ATORVASTATIN CALCIUM 40 MG: 40 TABLET, FILM COATED ORAL at 21:36

## 2020-02-28 RX ADMIN — OXYCODONE HYDROCHLORIDE 5 MG: 5 TABLET ORAL at 08:12

## 2020-02-28 RX ADMIN — PANTOPRAZOLE SODIUM 40 MG: 40 TABLET, DELAYED RELEASE ORAL at 08:01

## 2020-02-28 RX ADMIN — OXYCODONE HYDROCHLORIDE 5 MG: 5 TABLET ORAL at 20:09

## 2020-02-28 RX ADMIN — FUROSEMIDE 40 MG: 40 TABLET ORAL at 08:01

## 2020-02-28 RX ADMIN — Medication 1 CAPSULE: at 08:01

## 2020-02-28 ASSESSMENT — ACTIVITIES OF DAILY LIVING (ADL)
ADLS_ACUITY_SCORE: 17

## 2020-02-28 ASSESSMENT — MIFFLIN-ST. JEOR: SCORE: 1172.2

## 2020-02-28 NOTE — PROGRESS NOTES
"Social Work Services Progress Note    Hospital Day: 4  Date of Initial Social Work Evaluation:  2/25/20  Collaborated with:  Primary team Haleigh Robledo, TCU facilities    Data:  Pt is a 74-year-old female admitted to [facility] 2/25/20 who presented for evaluation of hypotension. TCU is recommended at discharge. Pt is medically stable for discharge.    Intervention:  [facility] able to accept pt today, but needs to get ahold of family to tell them they did not pass survey (Jasmina- admissions- [phone]). Also need to reach family to get agreement on discharge plan as pt unable to make own decisions. Called family (ph [phone])- did not leave voicemail as family has not returned other voicemails. Per RN family not present at hospital. Asked to be notified if family arrives.    Spoke with pt's HD center and confirmed HD schedule.  Cristiana Lucas  [address] [place] [address]  Ph [phone]  Fax [phone]  T/Th/Sat- 7am  Pt should arrive at 6:30am and be picked up at 10:30am.     Per HD center, pt's daughter and granddaughter have been driving her to HD. D/t circumstances of pt's home/family situation, would prefer pt to have medical transportation scheduled.    Followed up on other TCU referrals:  -[facility] (ph [phone])- no beds available  -[facility] (ph [phone])- globally declined from [facility] d/t \"contiually smoking in her bed\" during previous stay    Received update adult protection worker came to hospital- Matthias Stanford (ph [phone])- left .     Assessment:  Discharge planning    Plan:    Anticipated Disposition:  Facility:  TCU    Barriers to d/c plan:  Reaching family    Follow Up:  SW to follow for discharge planning    ANA Valdez, [facility]    [facility]  Pager [phone]  Phone [phone]    Addendum 2:54pm: Faxed additional referral to [facility] (ph [phone] and [facility] " at [facility] ([place] liaison Antonia, ph [phone]). Received VM that [facility] admissions unable to reach family as well. Spoke with AP worker Matthias- she is aware we are pursuing TCU placement.    Addendum 3:40pm: Pt's daughter April visiting. Met with pt and April. Updated them on acceptance to [facility] and that they did not pass survey. Pt's daughter states this facility is too far away and declines placement there. Updated pt and dtr on additional referral to [facility] and [facility] at [facility]. Pt and dtr agreeable with pt at either, with preference for [facility].    Completed PAS in anticipation of discharge: [id number].

## 2020-02-28 NOTE — PLAN OF CARE
Patient blood glucose was 75 at 0200. She had orange juice, tomato soup, and a popsicle. Oxycodone given for pain. Bengay applied to bilateral legs, and low back. Patient was found to be smoking in the room and told to stop, which she did. Declines lidoderm and nicotine patches. Continue with current plan of nursing care, and update MD with concerns as needed.

## 2020-02-28 NOTE — PLAN OF CARE
Discharge Planner OT   Patient plan for discharge: TCU  Current status: observed patient checking her purse, located under the seat of her walker.  Patient completed shower task with SBA, and multiple VC for safety/management of task.  Barriers to return to prior living situation: impaired cognition, current level of assist, decreased safety awareness  Recommendations for discharge: TCU  Rationale for recommendations: patient below baseline, and would benefit from continued therapy to increase safety and independence with ADLs       Entered by: KATIE PARK 02/28/2020 12:02 PM

## 2020-02-28 NOTE — PLAN OF CARE
Vital signs stable on RA. Up self in room with personal walker. Pt sitting on walker and wheeling self around in room most of shift. Door alarm in place. Pt disoriented to time only. Daughter visited this shift. PRN oxycodone given x1 this shift. Waiting for TCU placement. Plan for dialysis tomorrow. Will continue to monitor.

## 2020-02-28 NOTE — PLAN OF CARE
"PT - Ax1 with 4WW. Recommend use of alarms    Discharge Planner PT   Patient plan for discharge: not stated  Current status: Pt seems frustrated with participation in therapy, stating \"I'll do what I want to do\" and \"I don't need to listen to you\". Pt ambulates ~40ft x2 with 4WW with close SBA. Pt sits on 4WW, demonstrates poor insight to safety awareness with brake management. Pt does not follow therapist's commands and scoots self while sitting on walker, rather than ambulating.  Barriers to return to prior living situation: medical status, cognition, decreased safety awareness  Recommendations for discharge: TCU  Rationale for recommendations: Pt will benefit from continued skilled PT in order to progress strength, activity tolerance and performance and safety with functional mobility.  Entered by: Maggie Wallace 02/28/2020 10:07 AM       "

## 2020-02-28 NOTE — PLAN OF CARE
Pt disoriented to situation, Pt elevated this evening. Went to recheck BP and pt took cuff off after multiple attempts due to cuff pressure. Refused IV placement for BP medications. re approached pt for BP check. /46. Requesting to go off unit to smoke. Offered nicotine patch and gum. Pt refused. Currently compliant on staying on unit. Denies pain. Refused lidocaine patches. BA on for safety. Will continue to monitor.

## 2020-02-28 NOTE — PLAN OF CARE
"Assumed care 1500 to 1930. Pt alert, disoriented to situation. Pt has pain heating pack in place, rest promoted, and PRN oxycodone given. Pt  no insulin given. Pt has a fair appetite, good fluid intake. Pt has diminished lower lung sounds. Pt went outside to smoke with family one time. Curtis pts son and April pts daughter came to visit. Family agrees that pt should go to TCU.  Pt states \" I  want to leave and go home.\" Nurse talked with pt about needing more care and educated about the TCU. Pt is now resting in bed. Bed alarm on. Will continue to monitor Plan: for pt to transfer to TCU.   "

## 2020-02-28 NOTE — PROGRESS NOTES
Social Work Services Progress Note    Hospital Day: 4  Date of Initial Social Work Evaluation:  2/25/2020 - Please see for details   Collaborated with:  Primary team Haleigh 3 andTCU    Data:  Pt is a 74-year-old female admitted to [facility] 2/25/20 who presented for evaluation of hypotension. TCU is recommended at discharge. Pt is medically stable for discharge.    Intervention:  VELASQUEZ received a VM from [facility] at [facility]. She reported that pt looks like she is appropriate for their facility and admission coordinator also does admissions at [facility]. She inquired about transportation to  and living situation. [facility] reported that they are appealing due to [facility] not passing their survey. She reported that they have to disclose this information with referring facilities and pt/family. Quoc indicated that she would be happy to call family regarding the survey. [facility] and [facility] have immediate openings.     VELASQUEZ called a [facility] and left a VM indicating that pt lives with family and per daughter, April drives pt to from . VELASQUEZ requested [facility] call family regarding survey if family had questions regarding survey. VELASQUEZ provided April and Ki' phone number [phone])    Assessment:  Per team, pt is medically stable for discharge     Plan:    Anticipated Disposition:  Facility:  TCU: TBD     Barriers to d/c plan:  Accepting facility     Follow Up:  SW to follow up as needed     ANA Johnson, [facility]  Acute Care Float   [facility]  Pager: [phone]

## 2020-02-28 NOTE — PROGRESS NOTES
[facility]    Progress Note - Haleigh 3 Service        Date of Admission:  2/25/2020    Changes Today  - Pharm med rec noting pt likely only taking oxycodone at home based on pharm fills  - Awaiting TCU placement (hopefully 2/28)  - Discontinue PPI as with low concern for GIB    Assessment & Plan  Kim Anne is a 74 year old female admitted on 2/25/2020. She past medical history of end-stage renal disease secondary to diabetes on hemodialysis Tuesday Thursday Saturday complicated by peripheral neuropathy, COPD, hypertension, hypothyroidism, and multiple prior hospitalizations due to hypotension in the setting of hemodialysis runs with additional concerns about executive processing and potential vulnerable adult.    Dispo  PT and OT recommending TCU. Prior discussion with pt and her daughter at bedside as well as with her son Ki on the phone. At this time everyone is in agreement that we will pursue TCU placement. There was some concern regarding need to go to the bank requiring pt to be discharged from the hospital, but at this time she is agreeing to stay. Pt is holdable if her family is unable to be reached due to her underlying cognitive deficit, her son is the POA (and daughter if the son is unreachable).  - Plan for TCU on discharge at this time  - Pt medically stable for discharge - FYI if family decides to discharge her home it would not be an AMA discharge     Acute vs Chronic Encephalopathy - multifactorial   Likely dementia  Patient presented alert and oriented to time but not place, confused on the day's events, not remembering recent hospitalizations.  Additionally prior hospitalization at [facility] noted a [place] of 9 out of 30.  At that time recommendation for TCU or in-home care was placed with patient and her family refusing.  Concerns for vulnerable adult raised at that time.  Additional given history of diabetes with end-stage renal disease as well as  MI likely vascular component to dementia.  Further complicated in the setting of recent hypoperfusion.  And likely vulnerable underlying substrate. Elevated TSH on presentation. Hep B/C negative. HIV neg. RPR negative. B12 and folate wnl  - cont PTA statin  - PT/OT      CKD on hemodialysis  Hypotension in setting of HD (resolved)  Hyperkalemia   Patient with history of recurrent hypotension in setting of HD runs, multiple hospitalizations/ED visits in past several months for same primary issue. Responsive to 400 mL bolus. Per chart review appears attempts have been made to adjust dry weight to prevent from happening. May need further modification. Incomplete run 2/25 later with hyperkalemia responsive to shifting and emergent HD.  - Renal consult  - Remote Tele  - Hold PTA dilt (240 mg ER) in setting of recent hypoTN, and unknown last dose  - Continue PTA lasix     Acute on chronic anemia w/ CKD and ACD   Patient presents with acute on chronic macrocytic anemia with rising MCV from prior baseline, 106 concern for nutritional causes as well as uremia in addition to chronic kidney disease (additionally possible reticulocytosis).  Positive guaiac in ED, however no history of symptoms c/f GIB (melena, hematemesis, hematochezia). Possible components of macro and micro anemia. Ferritin greatly elevated with low transferrin an nl iron sat, c/f anemia of chronic diease. B12 and folate wnl. No clinical symptoms concerning for GIB at this time. Smear with moderate anemia, no evidence of hemolysis and minimal reticulocytosis.  - discontinue PPI BID  - MMA pending (folate and B12 wnl)  - Epogen 27012 U per nephro 2/26      CKD on HD  - Renal consult  - nephrocaps  - dc Selevar per renal, start phosplow for hypocal  - Renal diet  - Continue PTA lasix     DMII  Unclear if on insulin as outpt, conflicting results in chart of medications between [facility], [facility], and [facility]. Likely not per pharm med rec.  - sliding scale insulin BG   target  - Pharmacy med rec - likely only on oxy     Pseudohypocalcemia  Ca++ corrects with pts low albumin     Medication non-adherence  Likely worsened by underlying chronic encephalopahthy, c/f dementia, as well as understandable confusion due to multiple medications and receiving care across a myriad of health systems.  - Restart medications, while minimizing total number for simplicity     CAD / HTN / MI - PTA atorvastatin, restart ASA w/c/f GIB resolved, lasix and hold dilt as above   COPD - PTA  Spriva   Hypothyroidism - PTA Synthroid 175 mcg  Neuropathy - minimize opioids as able topical     Diet: Renal Diet  Fluids: PO  DVT Prophylaxis: Pneumatic Compression Devices  Chaney Catheter: not present  Code Status: FULL       Disposition Plan   Expected discharge: 1-2 days, recommended to transitional care unit once safe disposition plan/ TCU bed available.  Entered: Flakito Pickens MD 02/28/2020, 8:26 AM       The patient's care was discussed with the Attending Physician, Dr. Chew.    Flakito Pikcens  Internal Medicine and Pediatrics, PGY-1  P: [phone]    ______________________________________________________________________    Interval History   No acute events overnight. Eating breakfast. Pt wanting to leave hosp, amendable to going to TCU, voiced understanding of process of finding placement. No new cough, SOB, CP, N/v/d, f/s/c.    4 point ROS negative except for above.    Data reviewed today: I reviewed all medications, new labs and imaging results over the last 24 hours.     Physical Exam   Vital Signs: Temp: 98.9  F (37.2  C) Temp src: Oral BP: 105/50 Pulse: 61 Heart Rate: 64 Resp: 16 SpO2: 96 % O2 Device: None (Room air)    Weight: 153 lbs 3.2 oz     Constitutional: No acute distress. Appears slightly disheveled.   HEENT: Missing frontal teeth bilaterally, trachea midline  CV: Regular rate and rhythm with murmur systolic appreciated  Pulm: Increased anterior posterior chest diameter with  diminished breath sounds in all lung fields without evidence of rhonchi  Neuro: Alert, oriented to person place and time, fluent speech.     Data   Recent Labs   Lab 02/28/20  0548 02/27/20  0455 02/26/20  0454  02/25/20  1507 02/25/20  0955   WBC 4.6  --  5.9  --  7.4 7.8   HGB 8.5*  --  8.3*  --  9.0* 8.4*   *  --  107*  --  106* 106*     --  352  --  376 346   INR  --   --   --   --   --  0.95    137 140   < >  --  137   POTASSIUM 4.9 4.7 4.6  4.6   < >  --  5.1   CHLORIDE 101 104 106   < >  --  102   CO2 27 26 25   < >  --  29   BUN 15 20 30   < >  --  56*   CR 3.32* 3.12* 3.21*   < >  --  4.69*   ANIONGAP 6 7 8   < >  --  5   FRANCES 8.5 8.7 8.8   < >  --  7.8*   * 141* 204*   < >  --  155*   ALBUMIN  --   --  2.9*  --   --  3.0*   PROTTOTAL  --   --  6.6*  --   --  6.7*   BILITOTAL  --   --  0.3  --   --  0.3   ALKPHOS  --   --  213*  --   --  225*   ALT  --   --  54*  --   --  50   AST  --   --  37  --   --  34   TROPI  --   --   --   --   --  0.021    < > = values in this interval not displayed.

## 2020-02-28 NOTE — DISCHARGE SUMMARY
[facility], [place]  Discharge Summary - Medicine & Pediatrics       Date of Admission:  2/25/2020  Date of Discharge:  3/2/2020  Discharging Provider: Dr. Chew  Discharge Service: [place] 3    Discharge Diagnoses    Primary DX(s):  Hypotension in the setting of HD - resolved  Acute vs Chronic Encephalopathy - multifactorial (Hypothyroidism, Vascular, Dementia, and Metabolic componenets)    Secondary DX(s):  Hypothyroidism, non-adherent to medication  Likely Dementia and failure to thrive   CKD on hemodialysis  Pseudohypocalcemia  CAD w/ h/o MI  HTN  Hypotension in setting of HD (resolved)  Hyperkalemia (resolved)    Follow-ups Needed After Discharge   Follow-up Appointments     Adult [facility] Follow-up and recommended labs and tests      Follow up with primary care provider, Ailyn Nieves, within 7   days for hospital follow- up.  No follow up labs or test are needed.      Appointments on [place] and/or [facility] (with [facility] or [facility]   provider or service). Call [phone] if you haven't heard regarding   these appointments within 7 days of discharge.          #Consider repeat TSH 4-6 weeks for synthroid dosing as current listed dose may be subomptial as questions of how long pt was getting dose increases while not actually taking medication     #Consider restarting a phos binder, with renal recommendations, once phos starts to elevate, favor phoslow over sevelar given hypocalciemia    #Consider restarting diltazem - as an outpatient, recorded dose 240 however pt has been off this medication for a long time, may need to start at lower dose for same logic as above with thyroid hormone         Unresulted Labs Ordered in the Past 30 Days of this Admission     Date and Time Order Name Status Description    2/25/2020 1507 T4 free In process     2/25/2020 1440 Methylmalonic Acid In process     2/25/2020 0955 T4 free In process     1/27/2020 2200 AFB Culture Non Blood  Preliminary     1/27/2020 0938 AFB Culture Non Blood Preliminary     1/25/2020 2016 AFB Culture Non Blood Preliminary     1/25/2020 0815 T4 free In process       These results will be followed up by PCP and hospitalist team    Discharge Disposition   Discharged to TCU  Condition at discharge: Stable    Hospital Course   Kim Anne was admitted on 2/25/2020 for hypovoluemic hypotension in setting of HD, with concern for acute on chronic encephalopathy and failure to thrive.  The following problems were addressed during her hospitalization:    Acute vs Chronic Encephalopathy - multifactorial (Hypothyroidism, Vascular, Dementia, and Metabolic components)  Patient presented alert and oriented to time but not place, confused on the day's events, not remembering recent hospitalizations.  Additionally [place] [facility] [place] of 9 out of 30. TSH significantly elevated (concern pt is not taking synthroid) may also be contributing. Additionally, given history of diabetes with end-stage renal disease as well as MI likely vascular component to dementia. Further complicated in the setting of recent hypoperfusion and likely underlying vulnerable brain (given above comorbidities). Hep B/C negative. HIV neg. RPR negative. B12 and folate wnl  - Synythroid 175 mcg, will need TSH recheck as outpt 4-6 weeks  - cont PTA statin  - PT/OT consults for TCU      Likely Dementia  Failure to thrive  [place] [facility] [place] - At that time recommendation for TCU or in-home care was placed with patient and her family refusing.  Concerns for vulnerable adult raised at that time and pt has an Adult  who has been in contact with the family and met with the pt and family during this hospitalization. Family cooperative and agreeable with social work and Adult Protective Services for initial TCU for rehab and strengthening during this hospital stay.  - PT/OT at TCU  - Consider further  neuropysch assessment for dementia and subtyping    CKD Stage V on hemodialysis - NEW DRY WEIGHT 66 kg  Hypotension in setting of HD (resolved)  Hyperkalemia (resolved)  Patient with history of recurrent hypotension in setting of HD runs, multiple hospitalizations/ED visits in past several months for same primary issue. Responsive to fluids. Per chart review appears attempts have been made to adjust dry weight to prevent from happening. Further up adjustment of dry weight per renal to new dry weight of 66 kg on 3/2/2020.  - Continue PTA lasix  - Continue t/th/sat HD     Acute on chronic anemia w/ CKD and ACD   Patient presents with acute on chronic macrocytic anemia with rising MCV from prior baseline, 106 concern for nutritional causes as well as uremia in addition to chronic kidney disease (additionally possible reticulocytosis).  Positive guaiac in ED, however no history of symptoms c/f GIB (melena, hematemesis, hematochezia) and no evidence of GIB during hospitalization. Possible components of macro and micro anemia. Ferritin greatly elevated with low transferrin an nl iron sat, c/f anemia of chronic diease. B12 and folate wnl. Smear with moderate anemia, no evidence of hemolysis and minimal reticulocytosis.  - MMA pending (folate and B12 wnl)  - Epogen 98615 U per nephro 2/26  - consider f/u repeat CBC 4-6 wks as outpt to assess epogen responsiveness     CKD V on HD    - nephrocaps  - dc Selevar per renal, holding phosphate binders for now per renal, once restart should consider phosplow for phosphate lowering in setting of hypocal  - Calcitriol 0.5 mcg TTS  - Continue PTA lasix      DMII - non insulin dependent w/complications neuropathy and ESRD  Unclear if on insulin as outpt, conflicting results in chart of medications between [facility], and [facility]. Likely not per pharm med rec. Given pts non adherence to medications as an outpatient concern for giving insulin in the setting of periodic use and potentially  hypoglycemic episodes.  As patient's blood glucose was relatively well controlled requiring only 2 units of supplemental insulin during her entire hospital stay consider holding off on prescribing insulin at this time until more adherent prescription practices can be established given risks of hypoglycemia with medication.    -Reevaluate need for insulin use in outpatient setting with established firm follow-up    Pseudohypocalcemia  Ca++ corrects with pts low albumin     Medication non-adherence  Likely worsened by underlying chronic encephalopahthy, c/f dementia, as well as understandable confusion due to multiple medications and receiving care across a myriad of health systems.  - Restart medications, while minimizing total number for simplicity      CAD / HTN / MI - PTA atorvastatin, restart ASA w/c/f GIB resolved, lasix and hold dilt as above   COPD - PTA  Spriva   Hypothyroidism - PTA Synthroid 175 mcg  Neuropathy - minimize opioids as able topical     Consultations This Hospital Stay   MEDICATION HISTORY IP PHARMACY CONSULT  NEPHROLOGY ESRD ADULT IP CONSULT  OCCUPATIONAL THERAPY ADULT IP CONSULT  PHYSICAL THERAPY ADULT IP CONSULT  PHARMACY IP CONSULT  VASCULAR ACCESS CARE ADULT IP CONSULT  MEDICATION HISTORY IP PHARMACY CONSULT  VASCULAR ACCESS CARE ADULT IP CONSULT  VASCULAR ACCESS CARE ADULT IP CONSULT    Code Status   Full Code     The patient was discussed with Dr. Naina Pickens  Internal Medicine and Pediatrics, PGY-1  P: [phone]    ______________________________________________________________________    Physical Exam   Vital Signs: Temp: 98.9  F (37.2  C) Temp src: Oral BP: 105/50 Pulse: 61 Heart Rate: 64 Resp: 16 SpO2: 96 % O2 Device: None (Room air)    Weight: 153 lbs 3.2 oz     General Appearance: No acute distress. Sitting up in bed. Awake and alert.  Heent: missing ad front teeth, trachea midline, no rhinorrhea  Respiratory: CTAB. No wheezes or crackles.  Cardiovascular: RRR. No m/r/g  GI:  BS+. Nondistended and nontender.  Skin: No rashes or jaundice.  Other: Pt conversant, intermittently forgetful. Speech fluent. Face symmetric.  Neuro: alert, orientated to person, time and general place, no tremor      Primary Care Physician   Ailyn Nieves    Discharge Orders      PHYSICAL THERAPY REFERRAL      OCCUPATIONAL THERAPY REFERRAL      Reason for your hospital stay    You were admitted to the hospital due to low blood pressures during hemodialysis.     Adult [facility] Follow-up and recommended labs and tests    Follow up with primary care provider, Ailyn Nieves, within 7 days for hospital follow- up.  No follow up labs or test are needed.      Appointments on [place] and/or [facility] (with [facility] or [facility] provider or service). Call [phone] if you haven't heard regarding these appointments within 7 days of discharge.     Activity    Your activity upon discharge: activity as tolerated     Discharge Instructions    - Continue your usual dialysis schedule  - Please follow up with your primary care doctor in the next week for post-hospitalization follow up     Full Code     Diet    Follow this diet upon discharge: Regular diet       Significant Results and Procedures   Most Recent 3 BMP's:  Recent Labs   Lab Test 02/29/20  0441 02/28/20  0548 02/27/20  0455    135 137   POTASSIUM 4.5 4.9 4.7   CHLORIDE 105 101 104   CO2 24 27 26   BUN 32* 15 20   CR 4.94* 3.32* 3.12*   ANIONGAP 7 6 7   FRANCES 8.2* 8.5 8.7   * 187* 141*       Discharge Medications   Current Discharge Medication List      CONTINUE these medications which have CHANGED    Details   albuterol (PROAIR HFA/PROVENTIL HFA/VENTOLIN HFA) 108 (90 Base) MCG/ACT inhaler Inhale 2 puffs into the lungs every 6 hours as needed for shortness of breath / dyspnea or wheezing  Qty: 18 g, Refills: 3    Comments: Pharmacy may dispense brand covered by insurance (Proair, or proventil or ventolin or generic albuterol  inhaler)  Associated Diagnoses: Chronic obstructive pulmonary disease, unspecified COPD type (H)      ammonium lactate (LAC-HYDRIN) 12 % external lotion Apply topically 2 times daily  Qty: 225 g, Refills: 0    Associated Diagnoses: Dry skin      ASPIR-LOW 81 MG EC tablet Take 1 tablet (81 mg) by mouth At Bedtime    Associated Diagnoses: History of MI (myocardial infarction); Essential hypertension, benign      atorvastatin (LIPITOR) 40 MG tablet Take 1 tablet (40 mg) by mouth At Bedtime  Qty: 30 tablet, Refills: 0    Associated Diagnoses: Hyperlipidemia LDL goal <100      Emollient (EUCERIN CALMING DAILY MOIST) CREA Externally apply 1 dose. topically daily  Qty: 1 Tube, Refills: 12    Associated Diagnoses: Type II or unspecified type diabetes mellitus with neurological manifestations, not stated as uncontrolled(250.60) (H)      fluticasone-salmeterol (ADVAIR) 250-50 MCG/DOSE inhaler Inhale 1 puff into the lungs every 12 hours  Qty: 1 Inhaler, Refills: 5    Associated Diagnoses: COPD exacerbation (H)      levothyroxine (SYNTHROID/LEVOTHROID) 175 MCG tablet Take 1 tablet (175 mcg) by mouth every morning (before breakfast)  Qty: 30 tablet, Refills: 0    Associated Diagnoses: Hypothyroidism, unspecified type      multivitamin RENAL (NEPHROCAPS/TRIPHROCAPS) 1 MG capsule Take 1 capsule by mouth daily  Qty: 30 capsule, Refills: 0    Associated Diagnoses: CKD (chronic kidney disease) stage 4, GFR 15-29 ml/min (H)      nitroGLYcerin (NITROSTAT) 0.4 MG sublingual tablet Place 1 tablet (0.4 mg) under the tongue every 5 minutes as needed for chest pain  Qty: 25 tablet, Refills: 0    Associated Diagnoses: History of MI (myocardial infarction)      oxyCODONE (ROXICODONE) 5 MG tablet Take 1 tablet (5 mg) by mouth every 8 hours as needed for moderate to severe pain  Qty: 15 tablet, Refills: 0    Associated Diagnoses: Chronic low back pain without sciatica, unspecified back pain laterality      polyethylene glycol (MIRALAX) packet  Take 17 g by mouth daily as needed for constipation  Qty: 10 packet, Refills: 0    Associated Diagnoses: Slow transit constipation      tiotropium (SPIRIVA) 18 MCG inhaled capsule Inhale 1 capsule (18 mcg) into the lungs daily  Qty: 30 capsule, Refills: 11    Associated Diagnoses: Chronic bronchitis, unspecified chronic bronchitis type (H); Pulmonary emphysema, unspecified emphysema type (H)      vitamin D3 (CHOLECALCIFEROL) 2000 units (50 mcg) tablet Take 1 tablet (2,000 Units) by mouth daily  Qty: 90 tablet, Refills: 3    Associated Diagnoses: Vitamin D deficiency disease         CONTINUE these medications which have NOT CHANGED    Details   blood glucose monitoring (FREESTYLE LITE) test strip TEST BLOOD SUGAR TWO TIMES A DAY  Qty: 50 strip, Refills: 12    Associated Diagnoses: Type 2 diabetes mellitus without complication, with long-term current use of insulin (H)      blood glucose monitoring (FREESTYLE) lancets Test BS two times daily as directed  Qty: 100 each, Refills: 1    Associated Diagnoses: Type 2 diabetes mellitus without complication, with long-term current use of insulin (H)      !! order for DME Equipment being ordered: Nebulizer  Qty: 1 Device, Refills: 0    Associated Diagnoses: Chronic obstructive pulmonary disease, unspecified COPD type (H)      !! order for DME Equipment being ordered: Boost.  Qty: 6 Can, Refills: 3    Associated Diagnoses: CKD (chronic kidney disease) stage 5, GFR less than 15 ml/min (H)      !! order for DME Equipment being ordered: cane  Qty: 1 each, Refills: 0    Associated Diagnoses: Non-compliant patient      !! order for DME Equipment being ordered: Diabetic Shoes  Qty: 1 each, Refills: 0    Associated Diagnoses: Type 2 diabetes mellitus with stage 5 chronic kidney disease not on chronic dialysis, without long-term current use of insulin (H)      !! order for DME Equipment being ordered: two pairs moderate knee high support hose  Qty: 2 Device, Refills: 1    Associated  Diagnoses: Edema, unspecified type      !! order for DME Equipment being ordered: Compression socks.  Strength:15-20 mmHg  Qty: 2 each, Refills: 0    Associated Diagnoses: Localized edema      !! order for DME One wheeled walker with seat and brakes and basket  Qty: 1 Device, Refills: 0    Associated Diagnoses: Risk for falls       !! - Potential duplicate medications found. Please discuss with provider.      STOP taking these medications       albuterol (PROVENTIL) (2.5 MG/3ML) 0.083% neb solution Comments:   Reason for Stopping:         docusate sodium (COLACE) 100 MG capsule Comments:   Reason for Stopping:             Allergies   Allergies   Allergen Reactions     Contrast Dye Hives and Itching     Clonidine      She had as IP and thinks it made her itchy     Diatrizoate Other (See Comments)     Diltiazem      Severe bradycardia     Iodine-131

## 2020-02-29 LAB
ANION GAP SERPL CALCULATED.3IONS-SCNC: 7 MMOL/L (ref 3–14)
BUN SERPL-MCNC: 32 MG/DL (ref 7–30)
CALCIUM SERPL-MCNC: 8.2 MG/DL (ref 8.5–10.1)
CHLORIDE SERPL-SCNC: 105 MMOL/L (ref 94–109)
CO2 SERPL-SCNC: 24 MMOL/L (ref 20–32)
CREAT SERPL-MCNC: 4.94 MG/DL (ref 0.52–1.04)
GFR SERPL CREATININE-BSD FRML MDRD: 8 ML/MIN/{1.73_M2}
GLUCOSE BLDC GLUCOMTR-MCNC: 109 MG/DL (ref 70–99)
GLUCOSE BLDC GLUCOMTR-MCNC: 134 MG/DL (ref 70–99)
GLUCOSE BLDC GLUCOMTR-MCNC: 142 MG/DL (ref 70–99)
GLUCOSE BLDC GLUCOMTR-MCNC: 164 MG/DL (ref 70–99)
GLUCOSE SERPL-MCNC: 107 MG/DL (ref 70–99)
PHOSPHATE SERPL-MCNC: 4.6 MG/DL (ref 2.5–4.5)
POTASSIUM SERPL-SCNC: 4.5 MMOL/L (ref 3.4–5.3)
SODIUM SERPL-SCNC: 136 MMOL/L (ref 133–144)

## 2020-02-29 PROCEDURE — 12000001 ZZH R&B MED SURG/OB UMMC

## 2020-02-29 PROCEDURE — 99232 SBSQ HOSP IP/OBS MODERATE 35: CPT | Mod: GC | Performed by: INTERNAL MEDICINE

## 2020-02-29 PROCEDURE — 90937 HEMODIALYSIS REPEATED EVAL: CPT

## 2020-02-29 PROCEDURE — 25000132 ZZH RX MED GY IP 250 OP 250 PS 637: Performed by: STUDENT IN AN ORGANIZED HEALTH CARE EDUCATION/TRAINING PROGRAM

## 2020-02-29 PROCEDURE — 00000146 ZZHCL STATISTIC GLUCOSE BY METER IP

## 2020-02-29 PROCEDURE — 80048 BASIC METABOLIC PNL TOTAL CA: CPT | Performed by: INTERNAL MEDICINE

## 2020-02-29 PROCEDURE — 63400005 ZZH RX 634: Performed by: INTERNAL MEDICINE

## 2020-02-29 PROCEDURE — 36415 COLL VENOUS BLD VENIPUNCTURE: CPT | Performed by: INTERNAL MEDICINE

## 2020-02-29 PROCEDURE — 84100 ASSAY OF PHOSPHORUS: CPT | Performed by: INTERNAL MEDICINE

## 2020-02-29 PROCEDURE — 25800030 ZZH RX IP 258 OP 636: Performed by: INTERNAL MEDICINE

## 2020-02-29 RX ADMIN — FUROSEMIDE 40 MG: 40 TABLET ORAL at 12:01

## 2020-02-29 RX ADMIN — SODIUM CHLORIDE 300 ML: 9 INJECTION, SOLUTION INTRAVENOUS at 08:23

## 2020-02-29 RX ADMIN — Medication 1 CAPSULE: at 12:01

## 2020-02-29 RX ADMIN — INSULIN ASPART 1 UNITS: 100 INJECTION, SOLUTION INTRAVENOUS; SUBCUTANEOUS at 08:24

## 2020-02-29 RX ADMIN — NICOTINE 1 PATCH: 14 PATCH, EXTENDED RELEASE TRANSDERMAL at 22:15

## 2020-02-29 RX ADMIN — MELATONIN 2000 UNITS: at 21:56

## 2020-02-29 RX ADMIN — ATORVASTATIN CALCIUM 40 MG: 40 TABLET, FILM COATED ORAL at 21:56

## 2020-02-29 RX ADMIN — UMECLIDINIUM 1 PUFF: 62.5 AEROSOL, POWDER ORAL at 12:01

## 2020-02-29 RX ADMIN — ASPIRIN 81 MG: 81 TABLET, COATED ORAL at 12:01

## 2020-02-29 RX ADMIN — SODIUM CHLORIDE 250 ML: 9 INJECTION, SOLUTION INTRAVENOUS at 08:24

## 2020-02-29 RX ADMIN — LEVOTHYROXINE SODIUM 175 MCG: 0.03 TABLET ORAL at 06:52

## 2020-02-29 RX ADMIN — OXYCODONE HYDROCHLORIDE 5 MG: 5 TABLET ORAL at 20:06

## 2020-02-29 RX ADMIN — Medication 1 MG: at 04:09

## 2020-02-29 RX ADMIN — Medication: at 08:24

## 2020-02-29 RX ADMIN — OXYCODONE HYDROCHLORIDE 5 MG: 5 TABLET ORAL at 06:54

## 2020-02-29 RX ADMIN — EPOETIN ALFA 4000 UNITS: 10000 SOLUTION INTRAVENOUS; SUBCUTANEOUS at 09:42

## 2020-02-29 ASSESSMENT — MIFFLIN-ST. JEOR
SCORE: 1176.74
SCORE: 1165.88
SCORE: 1176.29

## 2020-02-29 ASSESSMENT — ACTIVITIES OF DAILY LIVING (ADL)
ADLS_ACUITY_SCORE: 17

## 2020-02-29 NOTE — PROGRESS NOTES
[facility], [place]    Progress Note - [place] 3 Service        Date of Admission:  2/25/2020    Changes Today  - HD per TTS schedule w/ new outpt dry weight 66kg  - Continued search for TCU placement (prior offer too remote for family access), hopeful 3/2    Assessment & Plan  Kim Anne is a 74 year old female admitted on 2/25/2020. She past medical history of end-stage renal disease secondary to diabetes on hemodialysis Tuesday Thursday Saturday complicated by peripheral neuropathy, COPD, hypertension, hypothyroidism, and multiple prior hospitalizations due to hypotension in the setting of hemodialysis runs with additional concerns about executive processing and potential vulnerable adult/failure to thrive. Family agrees with TCU recs at this time.    Dispo  PT and OT recommending TCU. Prior discussion with pt and her daughter at bedside as well as with her son Ki on the phone. As of 2/27 everyone is in agreement that we will pursue TCU placement. There was some concern regarding need to go to the bank requiring pt to be discharged from the hospital, but she is continuing to agree to stay (family to contact [employer]). Pt is holdable if her family is unable to be reached due to her underlying cognitive deficit, her son is the POA (and daughter if the son is unreachable).  - Plan for TCU on discharge at this time  - Pt medically stable for discharge - FYI if family decides to discharge her home it would not be an AMA discharge     Acute vs Chronic Encephalopathy - multifactorial   Likely dementia  Pt often confused re: place and time.  Additionally prior [place] [facility] [place] a [place] of 9 out of 30.  At that time recommendation for TCU or in-home care was placed with patient and her family refusing.  Concerns for vulnerable adult raised at that time.  Additional given history of diabetes with end-stage renal disease as well as MI likely vascular component to  dementia.  Further complicated in the setting of recent hypoperfusion.  And likely vulnerable underlying substrate. Elevated TSH on presentation. Hep B/C negative. HIV neg. RPR negative. B12 and folate wnl  - cont PTA statin  - PT/OT      CKD on hemodialysis  Hypotension in setting of HD (resolved)  Hyperkalemia   Patient with history of recurrent hypotension in setting of HD runs, multiple hospitalizations/ED visits in past several months for same primary issue. Responsive to 400 mL bolus. Per chart review appears attempts have been made to adjust dry weight to prevent from happening. May need further modification. Incomplete run 2/25 later with hyperkalemia responsive to shifting and emergent HD.  - Renal consult  - Remote Tele  - Hold PTA dilt (240 mg ER) in setting of recent hypoTN, and unknown last dose  - Continue PTA lasix     Acute on chronic anemia w/ CKD and ACD   Patient presents with acute on chronic macrocytic anemia with rising MCV from prior baseline, 106 concern for nutritional causes as well as uremia in addition to chronic kidney disease (additionally possible reticulocytosis).  Positive guaiac in ED, however no history of symptoms c/f GIB (melena, hematemesis, hematochezia). Possible components of macro and micro anemia. Ferritin greatly elevated with low transferrin an nl iron sat, c/f anemia of chronic diease. B12 and folate wnl. No clinical symptoms concerning for GIB at this time. Smear with moderate anemia, no evidence of hemolysis and minimal reticulocytosis.  - discontinue PPI BID  - MMA pending (folate and B12 wnl)  - Epogen 78065 U per nephro 2/26      CKD on HD  - Renal consult  - nephrocaps  - dc Selevar per renal, start phosplow for hypocal  - Renal diet  - Continue PTA lasix     DMII  Unclear if on insulin as outpt, conflicting results in chart of [facility] [facility]. Likely not per pharm med rec.  - sliding scale insulin BG  target  - Pharmacy med rec -  likely only on oxy     Pseudohypocalcemia  Ca++ corrects with pts low albumin     Medication non-adherence  Likely worsened by underlying chronic encephalopahthy, c/f dementia, as well as understandable confusion due to multiple medications and receiving care across a myriad of health systems.  - Restart medications, while minimizing total number for simplicity     CAD / HTN / MI - PTA atorvastatin, restart ASA w/c/f GIB resolved, lasix and hold dilt as above   COPD - PTA  Spriva   Hypothyroidism - PTA Synthroid 175 mcg  Neuropathy - minimize opioids as able topical     Diet: Renal Diet  Fluids: PO  DVT Prophylaxis: Pneumatic Compression Devices  Chaney Catheter: not present  Code Status: FULL       Disposition Plan   Expected discharge: 1-2 days, recommended to transitional care unit once safe disposition plan/ TCU bed available.  Entered: Flakito Pickens MD 02/29/2020, 11:57 AM       The patient's care was discussed with the Attending Physician, Dr. Chew.    Flakito Pickens  Internal Medicine and Pediatrics, PGY-1  P: [phone]    ______________________________________________________________________    Interval History   No acute events overnight. HD 2/29 with mild cramping at end of run. Renal set new dry weight for 66 kg. Feels well otherwise, eager to leave hosp once TCU can be secured.    4 point ROS negative except for above.    Data reviewed today: I reviewed all medications, new labs and imaging results over the last 24 hours.     Physical Exam   Vital Signs: Temp: 98.4  F (36.9  C) Temp src: Oral BP: 132/70 Pulse: 58 Heart Rate: 62 Resp: 15 SpO2: 98 % O2 Device: None (Room air)    Weight: 146 lbs 9.69 oz     Constitutional: No acute distress. Appears slightly disheveled.   HEENT: Missing frontal teeth bilaterally, trachea midline  CV: Regular rate and rhythm with murmur systolic appreciated  Pulm: On RA, no increased WOB, non-cyanotic  Neuro: Alert, oriented to person time with place recalled after  prompting, fluent speech.     Data   Recent Labs   Lab 02/29/20  0441 02/28/20  0548 02/27/20  0455 02/26/20  0454  02/25/20  1828  02/25/20  1507 02/25/20  0955   WBC  --  4.6  --  5.9  --   --   --  7.4 7.8   HGB  --  8.5*  --  8.3*  --  9.2*  --  9.0* 8.4*   MCV  --  105*  --  107*  --   --   --  106* 106*   PLT  --  331  --  352  --   --   --  376 346   INR  --   --   --   --   --   --   --   --  0.95    135 137 140  --  138   < >  --  137   POTASSIUM 4.5 4.9 4.7 4.6  4.6   < > 7.2*   < >  --  5.1   CHLORIDE 105 101 104 106  --   --    < >  --  102   CO2 24 27 26 25  --   --    < >  --  29   BUN 32* 15 20 30  --   --    < >  --  56*   CR 4.94* 3.32* 3.12* 3.21*  --   --    < >  --  4.69*   ANIONGAP 7 6 7 8  --   --    < >  --  5   FRANCES 8.2* 8.5 8.7 8.8  --   --    < >  --  7.8*   * 187* 141* 204*  --  94   < >  --  155*   ALBUMIN  --   --   --  2.9*  --   --   --   --  3.0*   PROTTOTAL  --   --   --  6.6*  --   --   --   --  6.7*   BILITOTAL  --   --   --  0.3  --   --   --   --  0.3   ALKPHOS  --   --   --  213*  --   --   --   --  225*   ALT  --   --   --  54*  --   --   --   --  50   AST  --   --   --  37  --   --   --   --  34   TROPI  --   --   --   --   --   --   --   --  0.021    < > = values in this interval not displayed.

## 2020-02-29 NOTE — PROGRESS NOTES
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     ESKD: dialysis dependent since 1/25/2020; [facility] Liz. Access: still using tunneled RIJ (though had AVF placed 11/22/19). Run time: 3 hrs. EDW: 62 kg. Run shortened on day of admission due to hypotension.   - Dialysis today per TTS schedule  -  TCU placement, ? [facility] being arranged.  - needs home nursing after TCU for med set up and monitoring    Hyperkalemia, resolved: 7.0 night of admission, was shifted and dialyzed; has been running on high side at dialysis, likely dietary and shortened HD run    - has been OK since    - re-educate about renal diet     BP: currently 130-160's s/p fluids; PTA diltiazem 240 mg qday (appears to have hx of SVT), Lasix 40 mg qday  - Unlikely to be cardiac related, echo 1/2720 with EF 60%  - Likely will require higher EDW to avoid hypotension on HD  - Continue pre HD midodrine 10 mg and prn during  - BP meds (dilt and lasix) should be held before HD  - Hypothyroidism with pt likely not taking her levothyroxine     Volume: EDW 62 kg; O2 96% on RA; mild pulm edema on CXR; appears to have high IDWG making it difficult to stay at EDW. PTA on Lasix 40 mg qday. Pt reports ARAUZ.  She cramped near end of run so will establish new EDW at 65.5 kg (66kg as outpatient)  - Continue daily lasix (will hopefully reduce amount of UF needed on dialysis)    Anemia: hgb 8.5 g/dL; recent labs with hgb 10's, IS 16, ferritin 907; on Mircera/venofer protocol at outpt unit  - on EPO inpatient     BMD: iCa 4.1, phos 3.3- please recheck every 2-3 days (added on today)  recent ; PO calcitriol 0.5 mcg TTS, sevelamer 1 tab tid WM  - Continue PO calcitriol  - Holding phos  "binders for now; when resumed, should be on Phoslo instead of sevelemer given hypocalcemia    Recommendations were communicated to primary team via this note       Rasheeda Cristian Liz MD    Interval History :   Seen on dialysis, stable run so far with pre HD midodrine and good BP's, attempting 3 kg UF. Pt reports ARAUZ. Denies n/v, CP, chills.    Review of Systems:   4 point ROS neg other than as noted above    Physical Exam:   I/O last 3 completed shifts:  In: 720 [P.O.:720]  Out: -    /58   Pulse 53   Temp 98.4  F (36.9  C) (Oral)   Resp 12   Ht 1.651 m (5' 5\")   Wt 67.5 kg (148 lb 14.4 oz)   SpO2 98%   BMI 24.78 kg/m       GENERAL APPEARANCE: alert, NAD  EYES:  no scleral icterus, pupils equal  HENT: mouth without ulcers or lesions  PULM: diminished  CV: regular rhythm, normal rate     -edema trace peripheral  GI: soft   INTEGUMENT: no cyanosis, no rash  NEURO: flat affect  Access tunneled RIJ, maturing AVF    Labs:   All labs reviewed by me  Electrolytes/Renal -   Recent Labs   Lab Test 02/29/20  0441 02/28/20  0548 02/27/20  0455 02/26/20  0454  01/29/20  0003  01/27/20  0420  01/26/20  1612    135 137 140   < >  --    < > 137   < > 135   POTASSIUM 4.5 4.9 4.7 4.6  4.6   < > 4.2   < > 4.3   < > 5.3   CHLORIDE 105 101 104 106   < >  --    < > 106   < > 106   CO2 24 27 26 25   < >  --    < > 27   < > 25   BUN 32* 15 20 30   < >  --    < > 15   < > 24   CR 4.94* 3.32* 3.12* 3.21*   < >  --    < > 1.69*   < > 2.73*   * 187* 141* 204*   < >  --    < > 134*   < > 197*   FRANCES 8.2* 8.5 8.7 8.8   < >  --    < > 9.3   < > 8.9   MAG  --   --   --   --   --  1.6  --  1.9  --  2.0   PHOS  --   --   --  3.3  --  2.5  --  2.8  --  3.2    < > = values in this interval not displayed.       CBC -   Recent Labs   Lab Test 02/28/20  0548 02/26/20  0454 02/25/20  1828 02/25/20  1507   WBC 4.6 5.9  --  7.4   HGB 8.5* 8.3* 9.2* 9.0*    352  --  376       LFTs -   Recent Labs   Lab Test " 02/26/20  0454 02/25/20  0955 02/06/20  1151   ALKPHOS 213* 225* 215*   BILITOTAL 0.3 0.3 0.3   ALT 54* 50 40   AST 37 34 21   PROTTOTAL 6.6* 6.7* 6.9   ALBUMIN 2.9* 3.0* 3.3*       Iron Panel -   Recent Labs   Lab Test 02/25/20  0955 05/01/19  1320 02/16/18  0945   IRON 47 48 46   IRONSAT 20 34 28   LOU 1,088* 286* 134         Imaging:  Reviewed    Current Medications:    aspirin  81 mg Oral Daily     atorvastatin  40 mg Oral At Bedtime     furosemide  40 mg Oral Daily     gelatin absorbable  1 each Topical During Hemodialysis (from stock)     insulin aspart  1-7 Units Subcutaneous TID AC     insulin aspart  1-5 Units Subcutaneous At Bedtime     levothyroxine  175 mcg Oral QAM AC     lidocaine  3 patch Transdermal Q24h    And     lidocaine   Transdermal Q8H     multivitamin RENAL  1 capsule Oral Daily     nicotine  1 patch Transdermal Daily     nicotine   Transdermal Q8H     sodium chloride (PF)  3 mL Intracatheter Q8H     sodium chloride (PF)  9 mL Intracatheter During Hemodialysis (from stock)     sodium chloride (PF)  9 mL Intracatheter During Hemodialysis (from stock)     umeclidinium  1 puff Inhalation Daily     vitamin D3  2,000 Units Oral At Bedtime       dextrose       Rasheeda Cristian Liz MD

## 2020-02-29 NOTE — PROGRESS NOTES
HEMODIALYSIS TREATMENT NOTE    Date: 2/29/2020  Time: 10:22 AM    Data:  Pre Wt: 67.2 kg  Desired Wt: 65.2 kg   Post Wt: 67.2 kg  Weight change: 1 kg  Ultrafiltration - Post Run Net Total Removed (mL): 1000 mL  Vascular Access Status: CVC  Dialyzer Rinse: Streaked  Total Blood Volume Processed:  52.35 L  Total Dialysis (Treatment) Time: 3 hr   Dialysate Bath: K 2, Ca 3  Heparin: None    Lab:   No    Interventions:  epo given  1 unit of insulin given with meal    Assessment:  Pt denies pain/SOB at this time. Pt vitally stable at start of treatment. Fluid goal titrated to support BP. Pt resting during treatment and eating breakfast. Pt does not appear to have any visible edema.     Plan:    Will continue to monitor and follow POC per renal team.

## 2020-02-29 NOTE — PROGRESS NOTES
"2/29/2020:    Social Work Services Progress Note    Hospital Day: 5  Date of Initial Social Work Evaluation:  2/25/20  Collaborated with:  Chart review, TCU facilities    Data:  Pt is a 74-year-old female admitted to [facility] 2/25/20 who presented for evaluation of hypotension. TCU is recommended at discharge. Pt is medically stable for discharge.    Intervention:    Met with patient and asked about various other TCU facilities. She looked through lists and again identified [facility] and [facility] as her top choices, after already being advised that those referrals are pending. She was not fully able to comprehend request so she advised to call daughter or son. Son Ki gave permission for new referrals to be sent with clarification that patient and children will make eventual final decision.     New referrals:   [facility]  PH: [phone]  F: [phone]    [facility]  PH: [phone]  F: [phone]    [facility]  PH: [phone]  F: [phone]    [facility]  PH: [phone]  F: [phone]    [facility]   PH: [phone]  F: [phone]    [facility])  PH: [phone]  F: [phone]    [facility]  PH: [phone]  F: [phone]      Regarding TCU referrals:  [facility] [place]  PH: [phone]  F: [phone]  ----No admissions team on the weekend to review referral from Friday    [facility]  PH: [phone]  F: [phone]  ----No admissions team on the weekend to review referral    [facility] Declined, [facility] is \"too far away\" and family declined.     Completed PAS in anticipation of discharge: [id number]  Previous referral progress:  Patient admitted to [facility], family declined due to distance.  Declined globally from [facility] d/t \"contiually smoking in her bed\" during previous stay  No beds " available at [facility] (no weekend admissions to double check)    HD schedule- confirmed:  [facility]  [address] [place] [address]   [phone]  Fax [phone]  T/Th/Sat- 7am  Pt should arrive at 6:30am and be picked up at 10:30am.       Assessment:  Medically ready for discharge, pending TCU acceptance    Plan:    Anticipated Disposition:  Facility:  TCU    Barriers to d/c plan:  Facility acceptance and bed availability    Follow Up:   following for discharge planning      ANA Zamora, LGSW  [facility]  Casual   4th and 5th floor pager: [phone]

## 2020-02-29 NOTE — PLAN OF CARE
Assumed care 1900 to 0730. Vital signs stable on RA. Pt disoriented to time. Lung sounds clear equal bilaterally. Pt denies nausea. Fluids and snacks provided. Up in room with personal walker. Pt sitting on walker and wheeling self around in room. Door alarm in place. Pt voiding adequately. Patient has generalized pain.  PRN oxycodone given once this shift. Waiting for TCU placement. Plan for dialysis today. Will continue to monitor.

## 2020-03-01 LAB
GLUCOSE BLDC GLUCOMTR-MCNC: 104 MG/DL (ref 70–99)
GLUCOSE BLDC GLUCOMTR-MCNC: 130 MG/DL (ref 70–99)
GLUCOSE BLDC GLUCOMTR-MCNC: 157 MG/DL (ref 70–99)

## 2020-03-01 PROCEDURE — 25000132 ZZH RX MED GY IP 250 OP 250 PS 637: Performed by: STUDENT IN AN ORGANIZED HEALTH CARE EDUCATION/TRAINING PROGRAM

## 2020-03-01 PROCEDURE — 25000132 ZZH RX MED GY IP 250 OP 250 PS 637: Performed by: INTERNAL MEDICINE

## 2020-03-01 PROCEDURE — 00000146 ZZHCL STATISTIC GLUCOSE BY METER IP

## 2020-03-01 PROCEDURE — 12000001 ZZH R&B MED SURG/OB UMMC

## 2020-03-01 PROCEDURE — 99232 SBSQ HOSP IP/OBS MODERATE 35: CPT | Mod: GC | Performed by: INTERNAL MEDICINE

## 2020-03-01 RX ORDER — HYDROXYZINE HYDROCHLORIDE 10 MG/1
10 TABLET, FILM COATED ORAL
Status: COMPLETED | OUTPATIENT
Start: 2020-03-01 | End: 2020-03-01

## 2020-03-01 RX ADMIN — OXYCODONE HYDROCHLORIDE 5 MG: 5 TABLET ORAL at 03:35

## 2020-03-01 RX ADMIN — POLYETHYLENE GLYCOL 3350 17 G: 17 POWDER, FOR SOLUTION ORAL at 18:52

## 2020-03-01 RX ADMIN — ATORVASTATIN CALCIUM 40 MG: 40 TABLET, FILM COATED ORAL at 21:47

## 2020-03-01 RX ADMIN — UMECLIDINIUM 1 PUFF: 62.5 AEROSOL, POWDER ORAL at 08:58

## 2020-03-01 RX ADMIN — ASPIRIN 81 MG: 81 TABLET, COATED ORAL at 08:58

## 2020-03-01 RX ADMIN — NICOTINE 1 PATCH: 14 PATCH, EXTENDED RELEASE TRANSDERMAL at 09:04

## 2020-03-01 RX ADMIN — OXYCODONE HYDROCHLORIDE 5 MG: 5 TABLET ORAL at 11:46

## 2020-03-01 RX ADMIN — FUROSEMIDE 40 MG: 40 TABLET ORAL at 08:58

## 2020-03-01 RX ADMIN — SENNOSIDES AND DOCUSATE SODIUM 2 TABLET: 8.6; 5 TABLET ORAL at 18:52

## 2020-03-01 RX ADMIN — Medication 1 MG: at 00:18

## 2020-03-01 RX ADMIN — Medication: at 01:43

## 2020-03-01 RX ADMIN — SENNOSIDES AND DOCUSATE SODIUM 1 TABLET: 8.6; 5 TABLET ORAL at 02:07

## 2020-03-01 RX ADMIN — MELATONIN 2000 UNITS: at 21:47

## 2020-03-01 RX ADMIN — LEVOTHYROXINE SODIUM 175 MCG: 0.03 TABLET ORAL at 06:32

## 2020-03-01 RX ADMIN — Medication 1 CAPSULE: at 08:58

## 2020-03-01 RX ADMIN — LIDOCAINE 3 PATCH: 560 PATCH PERCUTANEOUS; TOPICAL; TRANSDERMAL at 20:54

## 2020-03-01 RX ADMIN — HYDROXYZINE HYDROCHLORIDE 10 MG: 10 TABLET, FILM COATED ORAL at 01:28

## 2020-03-01 ASSESSMENT — MIFFLIN-ST. JEOR: SCORE: 1179.46

## 2020-03-01 ASSESSMENT — ACTIVITIES OF DAILY LIVING (ADL)
ADLS_ACUITY_SCORE: 17

## 2020-03-01 NOTE — PLAN OF CARE
Vital signs stable on RA. Up with SBA and walker. Family present for short visit and took pt outside to smoke. Calm and cooperative. Door alarm on. Disoriented to time and situation. Blood sugars stable. PRN medication given for leg pain. Will continue to monitor.

## 2020-03-01 NOTE — PLAN OF CARE
Vital signs stable on RA. Up SBA with walker. Dialysis this shift, pt tolerated well. Good appetite. Disoriented to time and situation. Door alarm in place. Pt attempted to leave with family, see provider notification note. Blood sugars stable. PRN medication given for pain. Will continue to monitor.

## 2020-03-01 NOTE — PLAN OF CARE
"Assumed care 1900 to 0730. Vital signs stable on RA. Pt disoriented to time and situation. Lung sounds clear equal bilaterally. Pt denies nausea. Fluids and snacks provided. Up in room with personal walker. Pt sitting on walker and wheeling self around in room. Pt stated she feels \"restless\". Melatonin given for sleep, pt slept for a little bit, but then woke up restless again. Nurse took pt for a walk around the unit. PRN atarax given. Nurse sat with pt a rubbed her back. Bengay was applied to back and legs. Pt did not sleep well during the night. Door alarm in place. Pt voiding adequately. Patient has generalized pain.  PRN oxycodone given twice this shift. Waiting for TCU placement. Will continue to monitor.         "

## 2020-03-01 NOTE — PROGRESS NOTES
"Social Work Services Progress Note    Hospital Day: 6  Date of Initial Social Work Evaluation:  2/25/2020  Collaborated with:  Chart review, TCU follow up, April [phone] Left message    Data:  SW following for TCU placement.    Intervention:  SW placed phone calls to TCU's for follow up and left message for April with an update.    [facility]-  Left message  PH: [phone]  F: [phone]     [facility] - Left message  PH: [phone]  F: [phone]     [facility]- Spoke to Sakina, she will call back with information.  PH: [phone]  F: [phone]     [facility] TCU -Left message  PH: [phone]  F: [phone]     [facility] - Nurse manager called and stated that they are located in [place] which was out of the geographic location the family was looking for. Checked notes and this seems accurate as family passed on [facility].  PH: [phone]  F: [phone]     [facility] Left Message  PH: [phone]  F: [phone]     [facility]- Left message  PH: [phone]  F: [phone]         Completed PAS in anticipation of discharge: [id number]    Previous referral progress:  [facility] Declined.  Patient admitted to [facility], family declined due to distance.  Declined globally from [facility] d/t \"contiually smoking in her bed\" during previous stay       HD schedule- confirmed:  [facility]  [address]  [place] [address]  Ph [phone]  Fax [phone]  T/Th/Sat- 7am  Pt should arrive at 6:30am and be picked up at 10:30am.       Assessment:  TCU placement today seems unlikely due to lack of weekend admissions    Plan:    Anticipated Disposition:  Facility:  D    Barriers to d/c plan:  Bed Availability    Follow Up:  SW will continue to follow for discharge.    GRETCHEN Greene  [facility] Social Work  [facility]" [facility]  Weekend 4A, 4C, 4E, 5A 5B Pager: [phone]  Weekend 6A, 6B, 6C, 6D Pager: [phone]  Weekend 7A, 7B, 7C, 7D, 5C Pager: [phone]

## 2020-03-01 NOTE — PROVIDER NOTIFICATION
"Door alarm activated and pt seen with all belongings going down hallway. RN talked with patient and asked where she was going and attempted to redirect her. Pt reluctant to speak with staff and stated, \"I am an adult and I can decide for myself\". MD's paged and pt followed out to hospital entrance. Pt asked multiple times if she was planning or will to return to unit and pt stated, \"I'm choosing not to talk to you\". After a few minutes at entrance pt's family pulled up and quickly got her into their car. RN was able to talk with daughter and explained leaving would be AMA and that she couldn't leave the hospital premesis. Daughter very indirect and not engaging in conversation. She stated, \"we're just going to drive around and stuff\". Daughter told she needed to return within 30 minutes. Pt and family left but did return 30 minutes later.     Md's notified and asked for clarification on sign-out to cross-cover that pt could leave with family. Recommend that plan of care be printed and discussed with 5b charge nurse so the communication can be consistent and everyone be aware.     Continues on door alarm. Currently cooperative and willing to stay.   "

## 2020-03-01 NOTE — PROGRESS NOTES
[facility]    Progress Note - [facility] 3 Service        Date of Admission:  2/25/2020    Changes Today  - Continued search for TCU placement (prior offer too remote for family access), hopeful 3/2    Assessment & Plan  Kim Anne is a 74 year old female admitted on 2/25/2020. She past medical history of end-stage renal disease secondary to diabetes on hemodialysis Tuesday Thursday Saturday complicated by peripheral neuropathy, COPD, hypertension, hypothyroidism, and multiple prior hospitalizations due to hypotension in the setting of hemodialysis runs with additional concerns about executive processing and potential vulnerable adult/failure to thrive. Family has agreed with TCU recs last time they where spoken with.    Dispo  PT and OT recommending TCU. Prior discussion with pt and her daughter at bedside as well as with her son Ki on the phone. As of 2/27 everyone is in agreement that we will pursue TCU placement. There was some concern regarding need to go to the bank requiring pt to be discharged from the hospital, but she is continuing to agree to stay (family to contact [place]). Pt is holdable if her family is unable to be reached due to her underlying cognitive deficit, her son is the POA (and daughter if the son is unreachable).  - Plan for TCU on discharge at this time  - Pt medically stable for discharge - FYI if family decides to discharge her home and comes into the hospital to take her it would not be an AMA discharge  - attempt to contact family re: TCU placement vs home     Acute vs Chronic Encephalopathy - multifactorial   Likely dementia  Pt often confused re: place and time.  Additionally prior hospitalization at [facility] noted a [place] of 9 out of 30.  At that time recommendation for TCU or in-home care was placed with patient and her family refusing.  Concerns for vulnerable adult raised at that time.  Additional given history of diabetes with  end-stage renal disease as well as MI likely vascular component to dementia.  Further complicated in the setting of recent hypoperfusion.  And likely vulnerable underlying substrate. Elevated TSH on presentation. Hep B/C negative. HIV neg. RPR negative. B12 and folate wnl  - cont PTA statin  - PT/OT      CKD on hemodialysis  Hypotension in setting of HD (resolved)  Hyperkalemia   Patient with history of recurrent hypotension in setting of HD runs, multiple hospitalizations/ED visits in past several months for same primary issue. Responsive to 400 mL bolus. Per chart review appears attempts have been made to adjust dry weight to prevent from happening. May need further modification. Incomplete run 2/25 later with hyperkalemia responsive to shifting and emergent HD.  - Renal consult  - Remote Tele  - Hold PTA dilt (240 mg ER) in setting of recent hypoTN, and unknown last dose  - Continue PTA lasix     Acute on chronic anemia w/ CKD and ACD   Patient presents with acute on chronic macrocytic anemia with rising MCV from prior baseline, 106 concern for nutritional causes as well as uremia in addition to chronic kidney disease (additionally possible reticulocytosis).  Positive guaiac in ED, however no history of symptoms c/f GIB (melena, hematemesis, hematochezia). Possible components of macro and micro anemia. Ferritin greatly elevated with low transferrin an nl iron sat, c/f anemia of chronic diease. B12 and folate wnl. No clinical symptoms concerning for GIB at this time. Smear with moderate anemia, no evidence of hemolysis and minimal reticulocytosis.  - discontinue PPI BID  - MMA pending (folate and B12 wnl)  - Epogen 69640 U per nephro 2/26      CKD on HD  - Renal consult  - nephrocaps  - dc Selevar per renal, start phosplow for hypocal  - Renal diet  - Continue PTA lasix     DMII  Unclear if on insulin as outpt, conflicting results in chart of medications between [facility], [facility], and [facility]. Likely not per pharm med  "rec.  - sliding scale insulin BG  target  - Pharmacy med rec - likely only on oxy     Pseudohypocalcemia  Ca++ corrects with pts low albumin     Medication non-adherence  Likely worsened by underlying chronic encephalopahthy, c/f dementia, as well as understandable confusion due to multiple medications and receiving care across a myriad of health systems.  - Restart medications, while minimizing total number for simplicity     CAD / HTN / MI - PTA atorvastatin, restart ASA w/c/f GIB resolved, lasix and hold dilt as above   COPD - PTA  Spriva   Hypothyroidism - PTA Synthroid 175 mcg  Neuropathy - minimize opioids as able topical     Diet: Renal Diet  Fluids: PO  DVT Prophylaxis: Pneumatic Compression Devices  Chaney Catheter: not present  Code Status: FULL       Disposition Plan   Expected discharge: 1-2 days, recommended to transitional care unit once safe disposition plan/ TCU bed available.  Entered: Flakito Pickens MD 03/01/2020, 6:57 AM       The patient's care was discussed with the Attending Physician, Dr. Chew.    Flakito Pickens  Internal Medicine and Pediatrics, PGY-1  P: [phone]    ______________________________________________________________________    Interval History   There was confusion overnight about the patient leaving the hosp by herself.  Patient left hospital and met family down at a car to \"go riding around\".  Pt redirected back to room. While TCU is recommended for this patient and given her MOCA of 9/30 she cannot discharge herself. She is not on a hold, and as such her family can take her as they are her decision makers, provided that they come into the hospital to receive the patient.  Note that the medical recommendation is that she go to TCU.    Patient reports that she would prefer to go home.  As she is lonely.    4 point ROS negative except for above.    Data reviewed today: I reviewed all medications, new labs and imaging results over the last 24 hours.     Physical " Exam   Vital Signs: Temp: 97.8  F (36.6  C) Temp src: Oral BP: 123/60 Pulse: 64 Heart Rate: 64 Resp: 16 SpO2: 97 % O2 Device: None (Room air)    Weight: 149 lbs 0 oz     Constitutional: No acute distress. Appears slightly disheveled.   HEENT: Missing frontal teeth bilaterally, trachea midline  CV: Well perfused no lower extremity edema  Pulm: On RA, no increased WOB, non-cyanotic  Neuro: Alert, fluent speech.     Data   Recent Labs   Lab 02/29/20  0441 02/28/20  0548 02/27/20  0455 02/26/20  0454  02/25/20  1828  02/25/20  1507 02/25/20  0955   WBC  --  4.6  --  5.9  --   --   --  7.4 7.8   HGB  --  8.5*  --  8.3*  --  9.2*  --  9.0* 8.4*   MCV  --  105*  --  107*  --   --   --  106* 106*   PLT  --  331  --  352  --   --   --  376 346   INR  --   --   --   --   --   --   --   --  0.95    135 137 140  --  138   < >  --  137   POTASSIUM 4.5 4.9 4.7 4.6  4.6   < > 7.2*   < >  --  5.1   CHLORIDE 105 101 104 106  --   --    < >  --  102   CO2 24 27 26 25  --   --    < >  --  29   BUN 32* 15 20 30  --   --    < >  --  56*   CR 4.94* 3.32* 3.12* 3.21*  --   --    < >  --  4.69*   ANIONGAP 7 6 7 8  --   --    < >  --  5   FRANCES 8.2* 8.5 8.7 8.8  --   --    < >  --  7.8*   * 187* 141* 204*  --  94   < >  --  155*   ALBUMIN  --   --   --  2.9*  --   --   --   --  3.0*   PROTTOTAL  --   --   --  6.6*  --   --   --   --  6.7*   BILITOTAL  --   --   --  0.3  --   --   --   --  0.3   ALKPHOS  --   --   --  213*  --   --   --   --  225*   ALT  --   --   --  54*  --   --   --   --  50   AST  --   --   --  37  --   --   --   --  34   TROPI  --   --   --   --   --   --   --   --  0.021    < > = values in this interval not displayed.

## 2020-03-02 ENCOUNTER — APPOINTMENT (OUTPATIENT)
Dept: PHYSICAL THERAPY | Facility: CLINIC | Age: 75
DRG: 070 | End: 2020-03-02
Payer: COMMERCIAL

## 2020-03-02 VITALS
HEART RATE: 57 BPM | RESPIRATION RATE: 18 BRPM | BODY MASS INDEX: 24.93 KG/M2 | OXYGEN SATURATION: 99 % | SYSTOLIC BLOOD PRESSURE: 96 MMHG | WEIGHT: 149.6 LBS | TEMPERATURE: 99 F | DIASTOLIC BLOOD PRESSURE: 47 MMHG | HEIGHT: 65 IN

## 2020-03-02 LAB
GLUCOSE BLDC GLUCOMTR-MCNC: 108 MG/DL (ref 70–99)
GLUCOSE BLDC GLUCOMTR-MCNC: 110 MG/DL (ref 70–99)

## 2020-03-02 PROCEDURE — 25000132 ZZH RX MED GY IP 250 OP 250 PS 637: Performed by: STUDENT IN AN ORGANIZED HEALTH CARE EDUCATION/TRAINING PROGRAM

## 2020-03-02 PROCEDURE — 00000146 ZZHCL STATISTIC GLUCOSE BY METER IP

## 2020-03-02 PROCEDURE — 97530 THERAPEUTIC ACTIVITIES: CPT | Mod: GP

## 2020-03-02 PROCEDURE — 99238 HOSP IP/OBS DSCHRG MGMT 30/<: CPT | Mod: GC | Performed by: INTERNAL MEDICINE

## 2020-03-02 RX ORDER — MIDODRINE HYDROCHLORIDE 10 MG/1
TABLET ORAL
Qty: 30 TABLET | Refills: 0 | Status: ON HOLD | DISCHARGE
Start: 2020-03-02 | End: 2022-01-01

## 2020-03-02 RX ORDER — CALCIUM ACETATE 667 MG/1
667 CAPSULE ORAL
Qty: 90 CAPSULE | Refills: 0 | DISCHARGE
Start: 2020-03-02 | End: 2020-04-01

## 2020-03-02 RX ORDER — AMOXICILLIN 250 MG
1 CAPSULE ORAL 2 TIMES DAILY
Status: DISCONTINUED | OUTPATIENT
Start: 2020-03-02 | End: 2020-03-02 | Stop reason: HOSPADM

## 2020-03-02 RX ADMIN — ASPIRIN 81 MG: 81 TABLET, COATED ORAL at 08:31

## 2020-03-02 RX ADMIN — Medication 1 CAPSULE: at 08:31

## 2020-03-02 RX ADMIN — UMECLIDINIUM 1 PUFF: 62.5 AEROSOL, POWDER ORAL at 08:30

## 2020-03-02 RX ADMIN — OXYCODONE HYDROCHLORIDE 5 MG: 5 TABLET ORAL at 08:31

## 2020-03-02 RX ADMIN — LEVOTHYROXINE SODIUM 175 MCG: 0.03 TABLET ORAL at 06:35

## 2020-03-02 RX ADMIN — SENNOSIDES AND DOCUSATE SODIUM 2 TABLET: 8.6; 5 TABLET ORAL at 14:15

## 2020-03-02 RX ADMIN — Medication 1 MG: at 00:58

## 2020-03-02 RX ADMIN — SENNOSIDES AND DOCUSATE SODIUM 2 TABLET: 8.6; 5 TABLET ORAL at 08:31

## 2020-03-02 RX ADMIN — FUROSEMIDE 40 MG: 40 TABLET ORAL at 08:31

## 2020-03-02 RX ADMIN — OXYCODONE HYDROCHLORIDE 5 MG: 5 TABLET ORAL at 00:58

## 2020-03-02 RX ADMIN — OXYCODONE HYDROCHLORIDE 5 MG: 5 TABLET ORAL at 16:22

## 2020-03-02 RX ADMIN — POLYETHYLENE GLYCOL 3350 17 G: 17 POWDER, FOR SOLUTION ORAL at 08:31

## 2020-03-02 ASSESSMENT — ACTIVITIES OF DAILY LIVING (ADL)
ADLS_ACUITY_SCORE: 17

## 2020-03-02 NOTE — PROGRESS NOTES
Social Work Services Discharge Note      Patient Name:  Kim Anne     Anticipated Discharge Date:  3/2/20    Discharge Disposition:   TCU:  [facility]   [address] [address]   [phone]  Fax [phone]    Following MD:  Facility assignment     Pre-Admission Screening (PAS) online form has been completed.  The Level of Care (LOC) is:  Determined  Confirmation Code is:  [id number]  Patient/caregiver informed of referral to [facility] for Pre-Admission Screening for skilled nursing facility (SNF) placement and to expect a phone call post discharge from SNF.     Additional Services/Equipment Arranged:  [employer] (ph [phone]) wc van arranged for 4:30pm.    Cristiana Lucas- T/Th/Sat 7am  [address] [place] [address]   [phone]  Fax [phone]  T/Th/Sat- 7am  Ride scheduled for 3/3/20 through [employer] (ph [phone]) with [employer]- 6am and return at 10:30am.      Patient / Family response to discharge plan:  Notified pt's daughter/HCA April via phone (Ki not able to be reached). April in agreement with d/c plan. Pt in agreement with plan.     Persons notified of above discharge plan:  Primary team [facility] Liaison (ph [phone]), pt's daughter April (ph [phone]), pt, Cristiana Lucas, nephrology, RN Adrienne    Staff Discharge Instructions:  RN to call report to [phone].  SW to fax discharge orders and signed hard scripts for any controlled substances.  Please print a packet and send with patient.     Medicare Notice of Rights provided to the patient/family:  YES    ANA Valdez, LGSW    [facility]  Pager [phone]  Phone [phone]

## 2020-03-02 NOTE — DISCHARGE SUMMARY
Dialysis Discharge Summary Brief    [facility]  Division of Nephrology  Nephrology Discharge Dialysis Orders  Ph: [phone]  Fax: [phone]    Kim Anne  MRN: [id number]  YOB: 1945    [facility]  Primary Nephrologist: Dr. Liz    Date of Admission: 2/25/2020  Date of Discharge: 3/2/2020    Admitted for hypotension with dialysis. Re-established EDW at 67 kg. Started pre HD midodrine 10 mg. Stopped sevelamer and started Phoslo 1 tab tid WM. Please page me at  with any questions. Thanks much. Beulah Hutchins PA-C    Kim Anne is a 74 year old female with h/o ESRD (biopsy proven DKD, remote kidney donation prior to CKD) on TTS iHD, COPD not on home O2, and HTN presenting to the ED from dialysis due to hypotension.     ESKD: dialysis dependent since 1/25/2020; dialyzes TTS at [facility] with Dr. Liz. Access: still using tunneled RIJ (though had AVF placed 11/22/19). Run time: 3 hrs. Run shortened on day of admission due to hypotension.   - Dialysis per TTS schedule      Hyperkalemia, resolved: 7.0 night of admission, was shifted and dialyzed      BP: currently 130-160's s/p fluids; PTA diltiazem 240 mg qday (appears to have hx of SVT), Lasix 40 mg qday  - Unlikely to be cardiac related, echo 1/2720 with EF 60%  - Will require higher EDW to avoid hypotension on HD  - Continue pre HD midodrine 10 mg and prn during  - BP meds (dilt and lasix) should be held before HD  - Hypothyroidism with pt likely not taking her levothyroxine (now restarted)     Volume:    - Will re-establish EDW at 67 kg  - Continue daily lasix (will hopefully reduce amount of UF needed on dialysis)     Anemia: hgb 8.5 g/dL; recent labs with hgb 10's, IS 16, ferritin 907; on Mircera/venofer protocol at outpt unit     BMD: iCa 4.1, phos 4.6  recent ; PO calcitriol 0.5 mcg TTS, sevelamer 1 tab tid WM  - Continue PO calcitriol  - Start  Phoslo 1 tab tid WM (stop sevelamer, need calcium-based phos binder)    Discharge Diagnosis:    ICD-10-CM    1. Dementia without behavioral disturbance, unspecified dementia type (H) F03.90 PHYSICAL THERAPY REFERRAL     OCCUPATIONAL THERAPY REFERRAL     Glucose by meter     Glucose by meter     Glucose by meter     Glucose by meter     Glucose by meter     Glucose by meter     Basic metabolic panel     Glucose by meter     Glucose by meter     Phosphorus     Phosphorus     Glucose by meter     Glucose by meter     Glucose by meter     Glucose by meter     Glucose by meter     Glucose by meter     Glucose by meter     Glucose by meter     Glucose by meter     Glucose by meter     Glucose by meter     Glucose by meter     Glucose by meter     Glucose by meter     Glucose by meter     Glucose by meter     CANCELED: Phosphorus   2. Gastrointestinal hemorrhage, unspecified gastrointestinal hemorrhage type K92.2 UA with Microscopic     Urine Culture     Drug abuse screen 6 urine (chem dep)     ABO/Rh type and screen     INR     INR     HIV Antigen Antibody Combo     Methylmalonic Acid     Reticulocyte Count     Treponema Abs w Reflex to RPR and Titer     TSH with free T4 reflex     Vitamin B12     Folate     CBC with platelets differential     Folate     Folate     Reticulocyte count     TSH with free T4 reflex     TSH with free T4 reflex     Vitamin B12     Vitamin B12     T4 free     T4 free     T4 free     T4 free     T4 free     T4 free     Iron and iron binding capacity     Iron and iron binding capacity     Ferritin     Ferritin     T4 free     T4 free     Glucose by meter     Glucose by meter     Basic metabolic panel     ISTAT gases elec ica gluc tyron POCT     ISTAT gases elec ica gluc tyron POCT     Potassium     Calcium ionized - [place] ONLY     Potassium     Glucose by meter     Glucose by meter     Glucose by meter     Glucose by meter     Glucose by meter     Glucose by meter     Potassium      Potassium     Glucose by meter     Glucose by meter     Comprehensive metabolic panel     CBC with platelets differential     CBC with platelets differential     Glucose by meter     Glucose by meter     Phosphorus     Phosphorus     Glucose by meter     Glucose by meter     CK total     CK total     Glucose by meter     Glucose by meter     Basic metabolic panel     Glucose by meter     Glucose by meter     Glucose by meter     Glucose by meter     Glucose by meter     Glucose by meter     Glucose by meter     Glucose by meter     Glucose by meter     Glucose by meter     Glucose by meter     Glucose by meter     Basic metabolic panel     CBC with platelets     Glucose by meter     Glucose by meter     Glucose by meter     Glucose by meter     CANCELED: INR     CANCELED: Reticulocyte count     CANCELED: Iron and iron binding capacity     CANCELED: Ferritin     CANCELED: Potassium     CANCELED: Calcium ionized - [place] ONLY     CANCELED: Potassium     CANCELED: CBC with platelets differential     CANCELED: Phosphorus     CANCELED: Phosphorus     CANCELED: CK total   3. ESRD (end stage renal disease) on dialysis (H) N18.6     Z99.2    4. Transient hypotension I95.9    5. Malignant hypertensive kidney disease with chronic kidney disease stage V or end stage renal disease (H) I12.0    6. Type 2 diabetes mellitus with chronic kidney disease, without long-term current use of insulin, unspecified CKD stage (H) E11.22    7. End stage renal disease (H) N18.6    8. Encounter for extracorporeal dialysis (H) Z99.2    9. Chronic obstructive pulmonary disease, unspecified COPD type (H) J44.9 albuterol (PROAIR HFA/PROVENTIL HFA/VENTOLIN HFA) 108 (90 Base) MCG/ACT inhaler   10. Dry skin L85.3 ammonium lactate (LAC-HYDRIN) 12 % external lotion   11. Type II or unspecified type diabetes mellitus with neurological manifestations, not stated as uncontrolled(250.60) (H) E11.49 Emollient (EUCERIN CALMING DAILY MOIST) CREA    12. COPD exacerbation (H) J44.1 fluticasone-salmeterol (ADVAIR) 250-50 MCG/DOSE inhaler   13. History of MI (myocardial infarction) I25.2 nitroGLYcerin (NITROSTAT) 0.4 MG sublingual tablet     ASPIR-LOW 81 MG EC tablet   14. Slow transit constipation K59.01 polyethylene glycol (MIRALAX) packet   15. Chronic bronchitis, unspecified chronic bronchitis type (H) J42 tiotropium (SPIRIVA) 18 MCG inhaled capsule   16. Pulmonary emphysema, unspecified emphysema type (H) J43.9 tiotropium (SPIRIVA) 18 MCG inhaled capsule   17. Hyperlipidemia LDL goal <100 E78.5 atorvastatin (LIPITOR) 40 MG tablet   18. CKD (chronic kidney disease) stage 4, GFR 15-29 ml/min (H) N18.4 multivitamin RENAL (NEPHROCAPS/TRIPHROCAPS) 1 MG capsule   19. Chronic low back pain without sciatica, unspecified back pain laterality M54.5 oxyCODONE (ROXICODONE) 5 MG tablet    G89.29 DISCONTINUED: oxyCODONE (ROXICODONE) 5 MG tablet   20. Essential hypertension, benign I10 ASPIR-LOW 81 MG EC tablet   21. Hypothyroidism, unspecified type E03.9 levothyroxine (SYNTHROID/LEVOTHROID) 175 MCG tablet   22. Vitamin D deficiency disease E55.9 vitamin D3 (CHOLECALCIFEROL) 2000 units (50 mcg) tablet        [x] Resume all previous dialysis orders with exception as noted below    New Orders (if not applicable put NA):  Estimated Dry Weight 67 kg   Dialysis Duration    Dialysis Access    Antibiotics (dose per dialysis, end date)            Labs to be drawn at dialysis    Other major changes to dialysis prescription (e.g. Dialysate bath, heparin, blood flow rate, etc)      Medication changes (also fax the unit a copy of the discharge summary)         Stopped sevelamer  Started Phoslo 1 tab tid WM  Started midodrine 10 mg pre HD     Name of physician completing this form: Beulah Hutchins PA-C

## 2020-03-02 NOTE — PLAN OF CARE
Writer called RN to RN report to García RIVERA at [facility] at 15:40. RN aware writer will place pt lighters (for smoking cigarettes) in discharge packet.       Writer attempted to call RN García to update her that writer gave 5mg oxycodone at 1630 before pt discharged.

## 2020-03-02 NOTE — DISCHARGE INSTRUCTIONS
[address]- T/Th/Sat 7am  [address]  [place] [address]   [phone]  Fax [phone]  T/Th/Sat- 7am  Ride scheduled for 3/3/20 through [employer] ( [phone]) with [employer]- 6am and return at 10:30am.

## 2020-03-02 NOTE — PLAN OF CARE
Vital signs stable on RA. Up with SBA and walker. Blood sugars stable. Disoriented to time. Door alarm on. Plan for discharge this afternoon to TCU. Ride set for 1630. Family and pt agreeable at this time. All belongings in pt room except for lighter which is kept in med room bin. Pt will need it returned at time of discharge. No IV access. Will continue to monitor.     1445: Attempted to call report to facility but they stated they were not ready to take report but were aware that pt was coming.

## 2020-03-02 NOTE — PLAN OF CARE
PT 5B: Up with SBA using 4WW.     Discharge Planner PT   Patient plan for discharge: Home  Current status: Engaged pt in toileting at supervision, sit <> stand transfers at CGA with 4WW, and gait ~150ft with 4WW (pt could ambulate further, but declining).   Barriers to return to prior living situation: medical status, cognition, stairs at home  Recommendations for discharge: TCU  Rationale for recommendations: Pt is below her baseline for mobility and will continue to benefit from rehab to improve safety and IND.        Entered by: Luci Zamorano 03/02/2020 8:37 AM

## 2020-03-02 NOTE — PROGRESS NOTES
"    [facility], [place]    Progress Note - [facility] 3 Service        Date of Admission:  2/25/2020    Changes Today  - Continued search for TCU placement (prior offer too remote for family access), hopeful 3/2, potential placement \"[facility]\" pending family approval  - Schedule senna    Assessment & Plan  Kim Anne is a 74 year old female admitted on 2/25/2020. She past medical history of end-stage renal disease secondary to diabetes on hemodialysis Tuesday Thursday Saturday complicated by peripheral neuropathy, COPD, hypertension, hypothyroidism, and multiple prior hospitalizations due to hypotension in the setting of hemodialysis runs with additional concerns about executive processing and potential vulnerable adult/failure to thrive. Family has agreed with TCU recs last time they where spoken with.    Dispo  PT and OT recommending TCU. Prior discussion with pt and her daughter at bedside as well as with her son Ki on the phone. As of 2/27 everyone is in agreement that we will pursue TCU placement. Pt is holdable if her family is unable to be reached due to her underlying cognitive deficit, her son is the POA (and daughter if the son is unreachable).  - Plan for TCU on discharge at this time  - Pt medically stable for discharge - FYI if family decides to discharge her home and comes into the hospital to take her it would not be an AMA discharge  - attempt to contact family re: TCU placement vs home     Acute vs Chronic Encephalopathy - multifactorial   Likely dementia  Pt often confused re: place and time.  Additionally prior hospitalization at [facility] noted a [place] of 9 out of 30.  At that time recommendation for TCU or in-home care was placed with patient and her family refusing.  Concerns for vulnerable adult raised at that time.  Additional given history of diabetes with end-stage renal disease as well as MI likely vascular component to dementia.  Further " complicated in the setting of recent hypoperfusion.  And likely vulnerable underlying substrate. Elevated TSH on presentation. Hep B/C negative. HIV neg. RPR negative. B12 and folate wnl  - cont PTA statin  - PT/OT      CKD on hemodialysis  Hypotension in setting of HD (resolved)  Hyperkalemia   Patient with history of recurrent hypotension in setting of HD runs, multiple hospitalizations/ED visits in past several months for same primary issue. Responsive to 400 mL bolus. Per chart review appears attempts have been made to adjust dry weight to prevent from happening. May need further modification. Incomplete run 2/25 later with hyperkalemia responsive to shifting and emergent HD.  - Renal consult  - Remote Tele  - Hold PTA dilt (240 mg ER) in setting of recent hypoTN, and unknown last dose  - Continue PTA lasix     Acute on chronic anemia w/ CKD and ACD   Patient presents with acute on chronic macrocytic anemia with rising MCV from prior baseline, 106 concern for nutritional causes as well as uremia in addition to chronic kidney disease (additionally possible reticulocytosis).  Positive guaiac in ED, however no history of symptoms c/f GIB (melena, hematemesis, hematochezia). Possible components of macro and micro anemia. Ferritin greatly elevated with low transferrin an nl iron sat, c/f anemia of chronic diease. B12 and folate wnl. No clinical symptoms concerning for GIB at this time. Smear with moderate anemia, no evidence of hemolysis and minimal reticulocytosis.    - MMA pending (folate and B12 wnl)  - Epogen 16691 U per nephro 2/26      CKD on HD  - Renal consult  - nephrocaps  - dc Selevar per renal, start phosplow for hypocal  - Renal diet  - Continue PTA lasix     DMII  Unclear if on insulin as outpt, conflicting results in chart of medications between [facility], [facility], and [facility]. Likely not per pharm med rec.  - sliding scale insulin BG  target  - Pharmacy med rec - likely only on  oxy     Pseudohypocalcemia  Ca++ corrects with pts low albumin     Medication non-adherence  Likely worsened by underlying chronic encephalopahthy, c/f dementia, as well as understandable confusion due to multiple medications and receiving care across a myriad of health systems.  - Restart medications, while minimizing total number for simplicity     CAD / HTN / MI - PTA atorvastatin, restart ASA w/c/f GIB resolved, lasix and hold dilt as above   COPD - PTA  Spriva   Hypothyroidism - PTA Synthroid 175 mcg  Neuropathy - minimize opioids as able topical     Diet: Renal Diet  Fluids: PO  DVT Prophylaxis: Pneumatic Compression Devices  Chaney Catheter: not present  Code Status: FULL       Disposition Plan   Expected discharge: 1-2 days, recommended to transitional care unit once safe disposition plan/ TCU bed available.  Entered: Flakito Pickens MD 03/02/2020, 11:33 AM       The patient's care was discussed with the Attending Physician, Dr. Chew.    Flakito Pickens  Internal Medicine and Pediatrics, PGY-1  P: [phone]    ______________________________________________________________________    Interval History   Patient reports that she is willing to go to TCU today with plan to go home after.     Clarification of the events over the night of [phone] after discussion with family reports that patient was agitated and they wanted to drive around with her to calm her down to get her to stay in the hospital.  They were unable to find a place to park the car and, and hence the driving around.  They continue to endorse/support TCU following hospital discharge.    4 point ROS negative except for above.    Data reviewed today: I reviewed all medications, new labs and imaging results over the last 24 hours.     Physical Exam   Vital Signs: Temp: 98.2  F (36.8  C) Temp src: Oral BP: (!) 146/41 Pulse: 57 Heart Rate: 66 Resp: 18 SpO2: 96 % O2 Device: None (Room air)    Weight: 149 lbs 9.6 oz     Constitutional: No acute  distress. Appears slightly disheveled.   HEENT: Missing frontal teeth bilaterally, trachea midline  CV: Well perfused no lower extremity edema  Pulm: On RA, no increased WOB, non-cyanotic  Neuro: Alert, fluent speech.     Data   Recent Labs   Lab 02/29/20  0441 02/28/20  0548 02/27/20  0455 02/26/20  0454  02/25/20  1828  02/25/20  1507 02/25/20  0955   WBC  --  4.6  --  5.9  --   --   --  7.4 7.8   HGB  --  8.5*  --  8.3*  --  9.2*  --  9.0* 8.4*   MCV  --  105*  --  107*  --   --   --  106* 106*   PLT  --  331  --  352  --   --   --  376 346   INR  --   --   --   --   --   --   --   --  0.95    135 137 140  --  138   < >  --  137   POTASSIUM 4.5 4.9 4.7 4.6  4.6   < > 7.2*   < >  --  5.1   CHLORIDE 105 101 104 106  --   --    < >  --  102   CO2 24 27 26 25  --   --    < >  --  29   BUN 32* 15 20 30  --   --    < >  --  56*   CR 4.94* 3.32* 3.12* 3.21*  --   --    < >  --  4.69*   ANIONGAP 7 6 7 8  --   --    < >  --  5   FRANCES 8.2* 8.5 8.7 8.8  --   --    < >  --  7.8*   * 187* 141* 204*  --  94   < >  --  155*   ALBUMIN  --   --   --  2.9*  --   --   --   --  3.0*   PROTTOTAL  --   --   --  6.6*  --   --   --   --  6.7*   BILITOTAL  --   --   --  0.3  --   --   --   --  0.3   ALKPHOS  --   --   --  213*  --   --   --   --  225*   ALT  --   --   --  54*  --   --   --   --  50   AST  --   --   --  37  --   --   --   --  34   TROPI  --   --   --   --   --   --   --   --  0.021    < > = values in this interval not displayed.

## 2020-03-02 NOTE — PLAN OF CARE
OT 5B: Cancel. Pt scheduled to discharge to TCU this date at 4:30    Occupational Therapy Discharge Summary    Reason for therapy discharge:    Discharged to transitional care facility.    Progress towards therapy goal(s). See goals on Care Plan in [facility] electronic health record for goal details.  Goals not met.  Barriers to achieving goals:   discharge from facility.    Therapy recommendation(s):    Continued therapy is recommended.  Rationale/Recommendations:  Pt below baseline and would benefit from further therapy to increase IND and safety.

## 2020-03-02 NOTE — PROGRESS NOTES
"Patient has been educated on potential risks of choosing to leave the unit and that the responsibility for patient well-being will belong to the patient. Pt has been informed that admission to hospital is due to need for medical treatment. Education given to the patient on some of the potential risks included but are not limited to:      - lack of access to nursing intervention      - possible missed appointments with MD, therapies, tests      - possible missed medications, antibiotics, management of IV's    Patient Response: \"Yes, I know\"    Patient notified staff prior to leaving unit: Pt did not leave the unit.   Coban wrap placed over IV prior to pt leaving unit Pt does not have IV access    "

## 2020-03-02 NOTE — PLAN OF CARE
Writer assumed cares [id number]. Pt is AOX4. Oxycodone given X1 for B leg pain. Lungs clear. +BS, no stool since 2/25. Pt states she feels constipated but denies abd pain. Pt was given miralax and 4 senna tabs on day shift and TCU RN aware. Pt stable for discharge to TCU and left with [facility] via wheelchair at 16:35 with all belongings including wheeled walker, lighters and jacket.

## 2020-03-02 NOTE — PLAN OF CARE
Assumed care 1900 to 0730. Vital signs stable on RA. Pt disoriented to time and situation. Lung sounds clear equal bilaterally. Pt denies nausea. Fluids and snacks provided. Up in room with personal walker. Pt sitting on walker and wheeling self around in room. Melatonin given for sleep and was effective.  Door alarm in place. No PIV access provider aware.  Pt voiding adequately. Patient has generalized pain.  PRN oxycodone given once this shift. Waiting for TCU placement. Will continue to monitor.

## 2020-03-02 NOTE — PROGRESS NOTES
"Patient has been educated on potential risks of choosing to leave the unit and that the responsibility for patient well-being will belong to the patient. Pt has been informed that admission to hospital is due to need for medical treatment. Education given to the patient on some of the potential risks included but are not limited to:      - lack of access to nursing intervention      - possible missed appointments with MD, therapies, tests      - possible missed medications, antibiotics, management of IV's    Patient Response: \"Okay\"    Patient notified staff prior to leaving unit: Pt left with family x1 and did alert nursing staff  Coban wrap placed over IV prior to pt leaving unit: No IV access  "

## 2020-03-04 ENCOUNTER — PATIENT OUTREACH (OUTPATIENT)
Dept: GERIATRIC MEDICINE | Facility: CLINIC | Age: 75
End: 2020-03-04

## 2020-03-04 NOTE — PROGRESS NOTES
[facility] Care Coordination Contact    [facility] [phone]. Requested call back.     Return call to Pratibha, left voicemail requesting call back.    Batool Mai RN, BSN, PHN  [facility]  [phone]  Fax: [phone]

## 2020-03-04 NOTE — PLAN OF CARE
Physical Therapy Discharge Summary    Reason for therapy discharge:    Discharged to transitional care facility.    Progress towards therapy goal(s). See goals on Care Plan in [facility] electronic health record for goal details.  Goals partially met.  Barriers to achieving goals:   limited tolerance for therapy and discharge from facility.    Therapy recommendation(s):    Continued therapy is recommended.  Rationale/Recommendations:  progress strength, balance, activity tolerance and independence with functional mobility prior to safe discharge home.

## 2020-03-05 ENCOUNTER — TELEPHONE (OUTPATIENT)
Dept: FAMILY MEDICINE | Facility: CLINIC | Age: 75
End: 2020-03-05

## 2020-03-05 LAB — METHYLMALONATE SERPL-SCNC: 2.01 UMOL/L (ref 0–0.4)

## 2020-03-05 NOTE — TELEPHONE ENCOUNTER
Call from [facility] at [phone], who reports that the patient left AMA yesterday from the TCU that she entered on 3/2/20 post hospitalization.      Requesting patient be seen in IPC for face to face visit in order to send HC referral.  Will route to PCP for advice. Angelina Hollis RN March 5, 2020 10:27 AM

## 2020-03-05 NOTE — PROGRESS NOTES
[facility] Care Coordination Contact    Spoke to Pratibha ENG, at dialysis.  Member left AMA from TCU yesterday. Member did not show for dialysis this morning.  TCU is planning to call in a wellness check.  This CC to wait on wellness check before notifying home care and meals that she has returned home.  Concern is loosing service providers due to no shows.  Batool Mai RN, BSN, PHN  [facility]  [phone]  Fax: [phone]

## 2020-03-10 NOTE — TELEPHONE ENCOUNTER
She already has care coordination services through [facility] so I cannot follow her in clinic care coordination due to duplication of services.     Hafsa Gomez RN  [facility]-Care Coordination  [facility]  [facility]  [phone]

## 2020-03-10 NOTE — TELEPHONE ENCOUNTER
Have not heard anything and she has not scheduled any appointment to see me.   JUNIOR Ragland CNP

## 2020-03-11 ENCOUNTER — TELEPHONE (OUTPATIENT)
Dept: CARE COORDINATION | Facility: CLINIC | Age: 75
End: 2020-03-11

## 2020-03-11 NOTE — TELEPHONE ENCOUNTER
SW received call from [facility] adult protective services worker responding to report SW filed on pt's last admission, asking where pt had discharged to. SW updated [facility] worked that pt discharged to TCU and promptly left AMA. [facility] worker asked SW to note pt's lack of decisional capacity and that there are concerns that pt's decision-makers are not acting in her best interests in the chart. SW confirmed that these concerns are noted in multiple places. [facility] worker stated that [facility] APS does NOT file for emergency guardianship and that they would want the hospital to do so instead. SW expressed concern that so many VA reports have been submitted, by this facility and others, under the understanding that [facility] has the power to intervene for patient's well-being. SW also educated APS worker that this hospital does not normally petition for guardianship.    ANA Dao, [facility]  ICU    [facility]   P: [phone]  Pager: [phone]

## 2020-03-13 NOTE — TELEPHONE ENCOUNTER
LVM leola Renae updating inability to contact this patient. Angelina Hollis RN March 13, 2020 12:33 PM

## 2020-03-16 ENCOUNTER — PATIENT OUTREACH (OUTPATIENT)
Dept: GERIATRIC MEDICINE | Facility: CLINIC | Age: 75
End: 2020-03-16

## 2020-03-16 DIAGNOSIS — M54.50 CHRONIC LOW BACK PAIN WITHOUT SCIATICA, UNSPECIFIED BACK PAIN LATERALITY: ICD-10-CM

## 2020-03-16 DIAGNOSIS — E78.5 HYPERLIPIDEMIA LDL GOAL <100: ICD-10-CM

## 2020-03-16 DIAGNOSIS — I25.2 HISTORY OF MI (MYOCARDIAL INFARCTION): ICD-10-CM

## 2020-03-16 DIAGNOSIS — E03.9 HYPOTHYROIDISM, UNSPECIFIED TYPE: ICD-10-CM

## 2020-03-16 DIAGNOSIS — L85.3 DRY SKIN: ICD-10-CM

## 2020-03-16 DIAGNOSIS — I10 ESSENTIAL HYPERTENSION, BENIGN: ICD-10-CM

## 2020-03-16 DIAGNOSIS — E11.49 TYPE II OR UNSPECIFIED TYPE DIABETES MELLITUS WITH NEUROLOGICAL MANIFESTATIONS, NOT STATED AS UNCONTROLLED(250.60) (H): ICD-10-CM

## 2020-03-16 DIAGNOSIS — N18.4 CKD (CHRONIC KIDNEY DISEASE) STAGE 4, GFR 15-29 ML/MIN (H): ICD-10-CM

## 2020-03-16 DIAGNOSIS — G89.29 CHRONIC LOW BACK PAIN WITHOUT SCIATICA, UNSPECIFIED BACK PAIN LATERALITY: ICD-10-CM

## 2020-03-16 RX ORDER — ATORVASTATIN CALCIUM 40 MG/1
40 TABLET, FILM COATED ORAL AT BEDTIME
Qty: 90 TABLET | Refills: 0 | Status: ON HOLD | OUTPATIENT
Start: 2020-03-16 | End: 2022-01-01

## 2020-03-16 RX ORDER — OXYCODONE HYDROCHLORIDE 5 MG/1
5 TABLET ORAL EVERY 8 HOURS PRN
Qty: 90 TABLET | Refills: 0 | Status: ON HOLD | OUTPATIENT
Start: 2020-03-16 | End: 2021-06-04

## 2020-03-16 RX ORDER — ASPIRIN 81 MG/1
81 TABLET ORAL AT BEDTIME
Qty: 90 TABLET | Refills: 0 | Status: ON HOLD | OUTPATIENT
Start: 2020-03-16 | End: 2022-01-01

## 2020-03-16 RX ORDER — LEVOTHYROXINE SODIUM 175 UG/1
175 TABLET ORAL
Qty: 90 TABLET | Refills: 0 | Status: ON HOLD | OUTPATIENT
Start: 2020-03-16 | End: 2022-01-01

## 2020-03-16 RX ORDER — NITROGLYCERIN 0.4 MG/1
0.4 TABLET SUBLINGUAL EVERY 5 MIN PRN
Qty: 25 TABLET | Refills: 0 | Status: ON HOLD | OUTPATIENT
Start: 2020-03-16 | End: 2022-01-01

## 2020-03-16 RX ORDER — AMMONIUM LACTATE 12 G/100G
LOTION TOPICAL 2 TIMES DAILY
Qty: 225 G | Refills: 0 | Status: ON HOLD | OUTPATIENT
Start: 2020-03-16 | End: 2022-01-01

## 2020-03-16 RX ORDER — PETROLATUM,WHITE 41 %
1 OINTMENT (GRAM) TOPICAL DAILY
Qty: 1 TUBE | Refills: 12 | Status: SHIPPED | OUTPATIENT
Start: 2020-03-16 | End: 2022-10-14

## 2020-03-16 NOTE — PROGRESS NOTES
[facility] Care Coordination Contact    Message from clinic RN Angelina who says they have been attempting to reach member since we spoke about 7 days ago to follow up with PCP post TCU AMA discharge.  They have been unable to reach member.    CC also attempts to reach member.  Phone call does not go through.   Batool Mai RN, BSN, PHN  [facility]  [phone]  Fax: [phone]     No

## 2020-03-16 NOTE — TELEPHONE ENCOUNTER
The patient presents at clinic for refill of medication. All except roxicodone were approved per refill protocol and sent to [facility] pharmacy.    Would like narcotics sent to  pharmacy:    roxicodone 5 mg       Last Written Prescription Date:  2/28/20  Last Fill Quantity: 15,   # refills: 0  Last Office Visit: 1/7/20  Future Office visit:       Routing refill request to provider for review/approval because: not on [facility] refill protocol    [place] in [place]  was written by hospitalist Nick Chew and was filled on 3/2/20 . Routed to PCP for review. Angelina Hollis RN March 16, 2020 12:26 PM

## 2020-03-17 ENCOUNTER — TELEPHONE (OUTPATIENT)
Dept: FAMILY MEDICINE | Facility: CLINIC | Age: 75
End: 2020-03-17

## 2020-03-17 DIAGNOSIS — R41.3 MEMORY DIFFICULTIES: ICD-10-CM
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
## 2020-03-19 ENCOUNTER — TELEPHONE (OUTPATIENT)
Dept: FAMILY MEDICINE | Facility: CLINIC | Age: 75
End: 2020-03-19

## 2020-03-19 PROBLEM — F02.80 DEMENTIA ASSOCIATED WITH OTHER UNDERLYING DISEASE WITHOUT BEHAVIORAL DISTURBANCE (H): Status: ACTIVE | Noted: 2020-03-19

## 2020-03-19 PROBLEM — R41.3 MEMORY DIFFICULTIES: Status: ACTIVE | Noted: 2020-03-19

## 2020-03-19 NOTE — TELEPHONE ENCOUNTER
Forms faxed to [employer], [phone].  Forms sent to abstract after being faxed.   Hitesh Hicks MA on 3/19/2020 at 11:47 AM

## 2020-03-19 NOTE — TELEPHONE ENCOUNTER
Forms faxed to [employer] [phone].  Forms sent to abstract after being faxed.   Hitesh Hicks MA on 3/19/2020 at 11:51 AM
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
## 2020-03-19 NOTE — PROGRESS NOTES
[facility] Care Coordination Contact    2nd Attempt: Signed Letter not received from member, resent per process.   Shakila Fountain  Case Management Specialist  [facility]  [phone]

## 2020-03-24 LAB
MYCOBACTERIUM SPEC CULT: NORMAL
SPECIMEN SOURCE: NORMAL
SPECIMEN SOURCE: NORMAL

## 2020-03-25 LAB
MYCOBACTERIUM SPEC CULT: NORMAL
MYCOBACTERIUM SPEC CULT: NORMAL
SPECIMEN SOURCE: NORMAL

## 2020-03-25 NOTE — TELEPHONE ENCOUNTER
Will forward below message to Ailyn to review and advise if she has received forms.    Juan Miles, NICOLE  Care Coordinator   [facility]

## 2020-03-25 NOTE — TELEPHONE ENCOUNTER
Franklyn had called again wanting to know if patients PCP had received forms and if she has had a chance to fill the forms out yet.  If there are any questions regarding forms feel free to give Franklny a call @ [phone] and we can leave a confidential VM

## 2020-03-25 NOTE — TELEPHONE ENCOUNTER
Please see if you can locate these papers and place them at my desk. Will get them done on Friday. Thank you!  JUNIOR Ragland CNP

## 2020-03-26 ENCOUNTER — TELEPHONE (OUTPATIENT)
Dept: FAMILY MEDICINE | Facility: CLINIC | Age: 75
End: 2020-03-26
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
## 2020-03-26 NOTE — TELEPHONE ENCOUNTER
Franklyn had called again now stating that she had just gotten a call from patients Dialysis worker and patient is being sent to the ED due to very low B/P. Franklyn would like to know if PCP is able to Advocate and coordinate with [facility] about what they have discussed in the past.    Franklyn tel: [phone]

## 2020-03-27 ENCOUNTER — TELEPHONE (OUTPATIENT)
Dept: FAMILY MEDICINE | Facility: CLINIC | Age: 75
End: 2020-03-27

## 2020-03-27 ENCOUNTER — PATIENT OUTREACH (OUTPATIENT)
Dept: GERIATRIC MEDICINE | Facility: CLINIC | Age: 75
End: 2020-03-27

## 2020-03-27 NOTE — PROGRESS NOTES
[facility] Care Coordination Contact    Rec'd vm from Fadia, pharmacy at [facility] requesting a return call.  [phone]  Call placed to Sarah, member was admitted [facility] 326/2020 and she is inquiring if CC is aware of member has a home care nurse and most recent med list.  Provided contact to [employer] but stated that CC is unsure if they have been able to see member.  Reviewed Epic note 3/16/2020, member requested refill on medications and orders were called into pharmacy. Sarah will contact pharmacy.   Sakina Carrion RN, BC  [facility] Care Coordinator   [phone]  (Fax)

## 2020-03-27 NOTE — TELEPHONE ENCOUNTER
Reason for Call:  Other returning call     Detailed comments: Dr. Nieves had left a message saying she had faxed her form. Franklyn wanted to let Dr. Nieves know, that with Covid-19, she (Franklyn) does not have access to incoming faxes and requests the form the doctor had faxed for her, to be e-mailed to her ([email or web address].). States that's the only way she can receive info currently is by e-mail, not by fax.    Phone Number can be reached at: [phone]    Best Time: anytime    Can we leave a detailed message on this number? YES - secure/HIPAA compliant    Call taken on 3/27/2020 at 12:29 PM by Nellie Graves

## 2020-03-27 NOTE — TELEPHONE ENCOUNTER
Patient is currently hospitalized at [facility].  Called and spoke to the RN caring for her today.  Relayed PCP's desire to connect with hospitalist at [facility] and make sure a connection is made between SARABJIT Estes @ [phone], and in patient  before discharge back to unsafe surroundings.    He took writer's number to have them contact to begin process. Angelina Hollis RN March 27, 2020 8:58 AM

## 2020-04-02 NOTE — TELEPHONE ENCOUNTER
Franklyn had called again wanting to know if there is any way that patients PCP can resend the e-mail?  She (Franklyn) states that she still has not received these forms.  Franklyn also apologized for calling so much she just has a payee already lined up for patient and just needs the paperwork now.

## 2020-04-08 ENCOUNTER — TELEPHONE (OUTPATIENT)
Dept: FAMILY MEDICINE | Facility: CLINIC | Age: 75
End: 2020-04-08

## 2020-04-08 ENCOUNTER — MEDICAL CORRESPONDENCE (OUTPATIENT)
Dept: HEALTH INFORMATION MANAGEMENT | Facility: CLINIC | Age: 75
End: 2020-04-08

## 2020-04-08 NOTE — TELEPHONE ENCOUNTER
Forms faxed to, [email or web address] [phone].  Forms sent to abstract after being faxed.   Hitesh Hicks MA on 4/8/2020 at 2:11 PM

## 2020-04-14 ENCOUNTER — PATIENT OUTREACH (OUTPATIENT)
Dept: GERIATRIC MEDICINE | Facility: CLINIC | Age: 75
End: 2020-04-14

## 2020-04-14 NOTE — PROGRESS NOTES
[facility] Care Coordination Contact    Received a return call from Franklyn. She was just calling to get this Care Coordinator's observations from the home visit back in Dec 2019. Provided this Care Coordinator's observation of the environment. Franklyn had no further questions.  SARABJIT Horowitz  [facility]  [phone]

## 2020-04-14 NOTE — PROGRESS NOTES
[facility] Care Coordination Contact    Received a voice mail message from Franklyn Rossi  from [facility] ([phone]), requesting a return call.  Called Franklyn back and left a message.  SARABJIT Horowitz  [facility]  [phone]

## 2020-04-30 ENCOUNTER — PATIENT OUTREACH (OUTPATIENT)
Dept: GERIATRIC MEDICINE | Facility: CLINIC | Age: 75
End: 2020-04-30
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
## 2020-05-05 NOTE — PROGRESS NOTES
[facility] Care Coordination Contact    No Letter Received: 60 day tracking of letter complete, no letter received from member. Tracking discontinued.   Shakila Fountain  Case Management Specialist  [facility]  [phone]

## 2020-05-06 ENCOUNTER — PATIENT OUTREACH (OUTPATIENT)
Dept: GERIATRIC MEDICINE | Facility: CLINIC | Age: 75
End: 2020-05-06

## 2020-05-06 NOTE — PROGRESS NOTES
[facility] Care Coordination Contact    CC was made aware that post hospital discharge.  Member admitted to [facility].  Admission date 4/20/20.  Left voicemail for legal guardian Crystal.  Left voicemail for SW at [facility].   Batool Mai RN, BSN, PHN  [facility]  [phone]  Fax: [phone]

## 2020-05-07 ENCOUNTER — PATIENT OUTREACH (OUTPATIENT)
Dept: GERIATRIC MEDICINE | Facility: CLINIC | Age: 75
End: 2020-05-07

## 2020-05-07 NOTE — PROGRESS NOTES
[facility] Care Coordination Contact    Spoke with Enoch at [facility].  Member admitted to their facility on april20th.  Had an admission at [facility] 4/26-4/30 dx anemia.  DCd back to [facility] of [facility].  Member is no longer seeing a  PCP. Is seeing dr. yoon with [facility]. Member will disenroll with  [employer].   Batool Mai RN, BSN, PHN  [facility]  [phone]  Fax: [phone]

## 2020-07-07 ENCOUNTER — TELEPHONE (OUTPATIENT)
Dept: CARE COORDINATION | Facility: CLINIC | Age: 75
End: 2020-07-07

## 2021-01-01 ENCOUNTER — HOSPITAL ENCOUNTER (EMERGENCY)
Facility: CLINIC | Age: 76
Discharge: HOME | End: 2021-10-20
Attending: EMERGENCY MEDICINE | Admitting: EMERGENCY MEDICINE
Payer: COMMERCIAL

## 2021-01-01 ENCOUNTER — APPOINTMENT (OUTPATIENT)
Dept: GENERAL RADIOLOGY | Facility: CLINIC | Age: 76
End: 2021-01-01
Attending: EMERGENCY MEDICINE
Payer: COMMERCIAL

## 2021-01-01 ENCOUNTER — APPOINTMENT (OUTPATIENT)
Dept: CARDIOLOGY | Facility: CLINIC | Age: 76
End: 2021-01-01
Attending: EMERGENCY MEDICINE
Payer: COMMERCIAL

## 2021-01-01 VITALS
SYSTOLIC BLOOD PRESSURE: 123 MMHG | HEART RATE: 115 BPM | WEIGHT: 187 LBS | RESPIRATION RATE: 29 BRPM | TEMPERATURE: 97.2 F | OXYGEN SATURATION: 100 % | BODY MASS INDEX: 31.12 KG/M2 | DIASTOLIC BLOOD PRESSURE: 78 MMHG

## 2021-01-01 DIAGNOSIS — I47.19 ATRIAL TACHYCARDIA, PAROXYSMAL (H): ICD-10-CM

## 2021-01-01 LAB
ALBUMIN SERPL-MCNC: 2.9 G/DL (ref 3.4–5)
ALP SERPL-CCNC: 276 U/L (ref 40–150)
ALT SERPL W P-5'-P-CCNC: 14 U/L (ref 0–50)
ANION GAP SERPL CALCULATED.3IONS-SCNC: 7 MMOL/L (ref 3–14)
AST SERPL W P-5'-P-CCNC: 32 U/L (ref 0–45)
ATRIAL RATE - MUSE: 100 BPM
BASOPHILS # BLD AUTO: 0.1 10E3/UL (ref 0–0.2)
BASOPHILS NFR BLD AUTO: 1 %
BILIRUB SERPL-MCNC: 0.7 MG/DL (ref 0.2–1.3)
BUN SERPL-MCNC: 37 MG/DL (ref 7–30)
CALCIUM SERPL-MCNC: 8.7 MG/DL (ref 8.5–10.1)
CHLORIDE BLD-SCNC: 101 MMOL/L (ref 94–109)
CO2 SERPL-SCNC: 30 MMOL/L (ref 20–32)
CREAT SERPL-MCNC: 3.73 MG/DL (ref 0.52–1.04)
DIASTOLIC BLOOD PRESSURE - MUSE: NORMAL MMHG
EOSINOPHIL # BLD AUTO: 0.4 10E3/UL (ref 0–0.7)
EOSINOPHIL NFR BLD AUTO: 6 %
ERYTHROCYTE [DISTWIDTH] IN BLOOD BY AUTOMATED COUNT: 21.2 % (ref 10–15)
GFR SERPL CREATININE-BSD FRML MDRD: 11 ML/MIN/1.73M2
GLUCOSE BLD-MCNC: 158 MG/DL (ref 70–99)
HCT VFR BLD AUTO: 29.7 % (ref 35–47)
HGB BLD-MCNC: 8.8 G/DL (ref 11.7–15.7)
HOLD SPECIMEN: NORMAL
HOLD SPECIMEN: NORMAL
IMM GRANULOCYTES # BLD: 0 10E3/UL
IMM GRANULOCYTES NFR BLD: 0 %
INTERPRETATION ECG - MUSE: NORMAL
LYMPHOCYTES # BLD AUTO: 0.8 10E3/UL (ref 0.8–5.3)
LYMPHOCYTES NFR BLD AUTO: 13 %
MAGNESIUM SERPL-MCNC: 1.7 MG/DL (ref 1.6–2.3)
MCH RBC QN AUTO: 28.8 PG (ref 26.5–33)
MCHC RBC AUTO-ENTMCNC: 29.6 G/DL (ref 31.5–36.5)
MCV RBC AUTO: 97 FL (ref 78–100)
MONOCYTES # BLD AUTO: 0.6 10E3/UL (ref 0–1.3)
MONOCYTES NFR BLD AUTO: 9 %
NEUTROPHILS # BLD AUTO: 4.5 10E3/UL (ref 1.6–8.3)
NEUTROPHILS NFR BLD AUTO: 71 %
NRBC # BLD AUTO: 0 10E3/UL
NRBC BLD AUTO-RTO: 0 /100
P AXIS - MUSE: NORMAL DEGREES
PLATELET # BLD AUTO: 189 10E3/UL (ref 150–450)
POTASSIUM BLD-SCNC: 3.9 MMOL/L (ref 3.4–5.3)
PR INTERVAL - MUSE: 142 MS
PROT SERPL-MCNC: 7.2 G/DL (ref 6.8–8.8)
QRS DURATION - MUSE: 74 MS
QT - MUSE: 456 MS
QTC - MUSE: 588 MS
R AXIS - MUSE: 95 DEGREES
RBC # BLD AUTO: 3.06 10E6/UL (ref 3.8–5.2)
SODIUM SERPL-SCNC: 138 MMOL/L (ref 133–144)
SYSTOLIC BLOOD PRESSURE - MUSE: NORMAL MMHG
T AXIS - MUSE: 201 DEGREES
TROPONIN I SERPL-MCNC: 0.05 UG/L (ref 0–0.04)
TROPONIN I SERPL-MCNC: 0.06 UG/L (ref 0–0.04)
VENTRICULAR RATE- MUSE: 100 BPM
WBC # BLD AUTO: 6.4 10E3/UL (ref 4–11)

## 2021-01-01 PROCEDURE — 85025 COMPLETE CBC W/AUTO DIFF WBC: CPT | Performed by: EMERGENCY MEDICINE

## 2021-01-01 PROCEDURE — 36415 COLL VENOUS BLD VENIPUNCTURE: CPT | Performed by: EMERGENCY MEDICINE

## 2021-01-01 PROCEDURE — 71046 X-RAY EXAM CHEST 2 VIEWS: CPT

## 2021-01-01 PROCEDURE — 99285 EMERGENCY DEPT VISIT HI MDM: CPT | Mod: 25 | Performed by: EMERGENCY MEDICINE

## 2021-01-01 PROCEDURE — 93005 ELECTROCARDIOGRAM TRACING: CPT | Performed by: EMERGENCY MEDICINE

## 2021-01-01 PROCEDURE — 83735 ASSAY OF MAGNESIUM: CPT | Performed by: EMERGENCY MEDICINE

## 2021-01-01 PROCEDURE — 93242 EXT ECG>48HR<7D RECORDING: CPT

## 2021-01-01 PROCEDURE — 99291 CRITICAL CARE FIRST HOUR: CPT | Mod: 25

## 2021-01-01 PROCEDURE — 82040 ASSAY OF SERUM ALBUMIN: CPT | Performed by: EMERGENCY MEDICINE

## 2021-01-01 PROCEDURE — 71046 X-RAY EXAM CHEST 2 VIEWS: CPT | Mod: 26 | Performed by: STUDENT IN AN ORGANIZED HEALTH CARE EDUCATION/TRAINING PROGRAM

## 2021-01-01 PROCEDURE — 73630 X-RAY EXAM OF FOOT: CPT | Mod: 26 | Performed by: RADIOLOGY

## 2021-01-01 PROCEDURE — 73630 X-RAY EXAM OF FOOT: CPT | Mod: LT

## 2021-01-01 PROCEDURE — 82565 ASSAY OF CREATININE: CPT | Performed by: EMERGENCY MEDICINE

## 2021-01-01 PROCEDURE — 93010 ELECTROCARDIOGRAM REPORT: CPT | Performed by: EMERGENCY MEDICINE

## 2021-01-01 PROCEDURE — 84484 ASSAY OF TROPONIN QUANT: CPT | Mod: 91 | Performed by: EMERGENCY MEDICINE

## 2021-01-01 ASSESSMENT — ENCOUNTER SYMPTOMS: WOUND: 1

## 2021-02-19 ENCOUNTER — MEDICAL CORRESPONDENCE (OUTPATIENT)
Dept: HEALTH INFORMATION MANAGEMENT | Facility: CLINIC | Age: 76
End: 2021-02-19

## 2021-06-02 ENCOUNTER — TRANSFERRED RECORDS (OUTPATIENT)
Dept: HEALTH INFORMATION MANAGEMENT | Facility: CLINIC | Age: 76
End: 2021-06-02

## 2021-06-02 ENCOUNTER — APPOINTMENT (OUTPATIENT)
Dept: GENERAL RADIOLOGY | Facility: CLINIC | Age: 76
DRG: 280 | End: 2021-06-02
Attending: EMERGENCY MEDICINE
Payer: COMMERCIAL

## 2021-06-02 ENCOUNTER — HOSPITAL ENCOUNTER (INPATIENT)
Facility: CLINIC | Age: 76
LOS: 2 days | Discharge: NURSING, REHAB OR LONG-TERM CARE | DRG: 280 | End: 2021-06-04
Attending: EMERGENCY MEDICINE | Admitting: INTERNAL MEDICINE
Payer: COMMERCIAL

## 2021-06-02 DIAGNOSIS — I21.4 NSTEMI (NON-ST ELEVATED MYOCARDIAL INFARCTION) (H): ICD-10-CM

## 2021-06-02 DIAGNOSIS — G47.30 SLEEP APNEA, UNSPECIFIED TYPE: Primary | ICD-10-CM

## 2021-06-02 DIAGNOSIS — N18.6 ENCOUNTER REGARDING VASCULAR ACCESS FOR DIALYSIS FOR ESRD (H): ICD-10-CM

## 2021-06-02 DIAGNOSIS — Z11.52 ENCOUNTER FOR SCREENING LABORATORY TESTING FOR SEVERE ACUTE RESPIRATORY SYNDROME CORONAVIRUS 2 (SARS-COV-2): ICD-10-CM

## 2021-06-02 DIAGNOSIS — Z99.2 ENCOUNTER REGARDING VASCULAR ACCESS FOR DIALYSIS FOR ESRD (H): ICD-10-CM

## 2021-06-02 LAB
ALBUMIN SERPL-MCNC: 2.9 G/DL (ref 3.4–5)
ALP SERPL-CCNC: 258 U/L (ref 40–150)
ALT SERPL W P-5'-P-CCNC: 20 U/L (ref 0–50)
ANION GAP SERPL CALCULATED.3IONS-SCNC: 4 MMOL/L (ref 3–14)
APTT PPP: 39 SEC (ref 22–37)
AST SERPL W P-5'-P-CCNC: 31 U/L (ref 0–45)
BASOPHILS # BLD AUTO: 0 10E9/L (ref 0–0.2)
BASOPHILS NFR BLD AUTO: 0.4 %
BILIRUB SERPL-MCNC: 0.3 MG/DL (ref 0.2–1.3)
BUN SERPL-MCNC: 28 MG/DL (ref 7–30)
CALCIUM SERPL-MCNC: 8.3 MG/DL (ref 8.5–10.1)
CHLORIDE SERPL-SCNC: 98 MMOL/L (ref 94–109)
CO2 SERPL-SCNC: 32 MMOL/L (ref 20–32)
CREAT SERPL-MCNC: 2.63 MG/DL (ref 0.52–1.04)
DIFFERENTIAL METHOD BLD: ABNORMAL
EOSINOPHIL # BLD AUTO: 0 10E9/L (ref 0–0.7)
EOSINOPHIL NFR BLD AUTO: 0.1 %
ERYTHROCYTE [DISTWIDTH] IN BLOOD BY AUTOMATED COUNT: 18.6 % (ref 10–15)
GFR SERPL CREATININE-BSD FRML MDRD: 17 ML/MIN/{1.73_M2}
GLUCOSE SERPL-MCNC: 163 MG/DL (ref 70–99)
HCT VFR BLD AUTO: 25.6 % (ref 35–47)
HGB BLD-MCNC: 7.5 G/DL (ref 11.7–15.7)
IMM GRANULOCYTES # BLD: 0 10E9/L (ref 0–0.4)
IMM GRANULOCYTES NFR BLD: 0.5 %
INR PPP: 1.16 (ref 0.86–1.14)
INTERPRETATION ECG - MUSE: NORMAL
INTERPRETATION ECG - MUSE: NORMAL
LABORATORY COMMENT REPORT: NORMAL
LACTATE BLD-SCNC: 1.3 MMOL/L (ref 0.7–2)
LIPASE SERPL-CCNC: 101 U/L (ref 73–393)
LYMPHOCYTES # BLD AUTO: 0.2 10E9/L (ref 0.8–5.3)
LYMPHOCYTES NFR BLD AUTO: 2.3 %
MCH RBC QN AUTO: 32.8 PG (ref 26.5–33)
MCHC RBC AUTO-ENTMCNC: 29.3 G/DL (ref 31.5–36.5)
MCV RBC AUTO: 112 FL (ref 78–100)
MONOCYTES # BLD AUTO: 0.3 10E9/L (ref 0–1.3)
MONOCYTES NFR BLD AUTO: 3.8 %
NEUTROPHILS # BLD AUTO: 7.2 10E9/L (ref 1.6–8.3)
NEUTROPHILS NFR BLD AUTO: 92.9 %
NRBC # BLD AUTO: 0 10*3/UL
NRBC BLD AUTO-RTO: 0 /100
PLATELET # BLD AUTO: 194 10E9/L (ref 150–450)
POTASSIUM SERPL-SCNC: 3.5 MMOL/L (ref 3.4–5.3)
PROT SERPL-MCNC: 6.7 G/DL (ref 6.8–8.8)
RBC # BLD AUTO: 2.29 10E12/L (ref 3.8–5.2)
SARS-COV-2 RNA RESP QL NAA+PROBE: NEGATIVE
SODIUM SERPL-SCNC: 134 MMOL/L (ref 133–144)
SPECIMEN SOURCE: NORMAL
TROPONIN I SERPL-MCNC: 0.07 UG/L (ref 0–0.04)
TROPONIN I SERPL-MCNC: 0.07 UG/L (ref 0–0.04)
TROPONIN I SERPL-MCNC: 0.09 UG/L (ref 0–0.04)
UFH PPP CHRO-ACNC: 0.48 IU/ML
WBC # BLD AUTO: 7.7 10E9/L (ref 4–11)

## 2021-06-02 PROCEDURE — 5A1D70Z PERFORMANCE OF URINARY FILTRATION, INTERMITTENT, LESS THAN 6 HOURS PER DAY: ICD-10-PCS | Performed by: INTERNAL MEDICINE

## 2021-06-02 PROCEDURE — 85520 HEPARIN ASSAY: CPT | Performed by: INTERNAL MEDICINE

## 2021-06-02 PROCEDURE — 84484 ASSAY OF TROPONIN QUANT: CPT | Performed by: STUDENT IN AN ORGANIZED HEALTH CARE EDUCATION/TRAINING PROGRAM

## 2021-06-02 PROCEDURE — 93010 ELECTROCARDIOGRAM REPORT: CPT | Performed by: EMERGENCY MEDICINE

## 2021-06-02 PROCEDURE — 93005 ELECTROCARDIOGRAM TRACING: CPT

## 2021-06-02 PROCEDURE — 80053 COMPREHEN METABOLIC PANEL: CPT | Performed by: EMERGENCY MEDICINE

## 2021-06-02 PROCEDURE — 85610 PROTHROMBIN TIME: CPT | Performed by: EMERGENCY MEDICINE

## 2021-06-02 PROCEDURE — 85025 COMPLETE CBC W/AUTO DIFF WBC: CPT | Performed by: EMERGENCY MEDICINE

## 2021-06-02 PROCEDURE — 214N000001 HC R&B CCU UMMC

## 2021-06-02 PROCEDURE — 93010 ELECTROCARDIOGRAM REPORT: CPT | Mod: 76 | Performed by: EMERGENCY MEDICINE

## 2021-06-02 PROCEDURE — 84484 ASSAY OF TROPONIN QUANT: CPT | Performed by: EMERGENCY MEDICINE

## 2021-06-02 PROCEDURE — 93005 ELECTROCARDIOGRAM TRACING: CPT | Performed by: EMERGENCY MEDICINE

## 2021-06-02 PROCEDURE — 84484 ASSAY OF TROPONIN QUANT: CPT | Mod: 91 | Performed by: EMERGENCY MEDICINE

## 2021-06-02 PROCEDURE — 36415 COLL VENOUS BLD VENIPUNCTURE: CPT | Performed by: INTERNAL MEDICINE

## 2021-06-02 PROCEDURE — 83690 ASSAY OF LIPASE: CPT | Performed by: EMERGENCY MEDICINE

## 2021-06-02 PROCEDURE — 83605 ASSAY OF LACTIC ACID: CPT | Performed by: INTERNAL MEDICINE

## 2021-06-02 PROCEDURE — 250N000013 HC RX MED GY IP 250 OP 250 PS 637: Performed by: PHYSICIAN ASSISTANT

## 2021-06-02 PROCEDURE — 99285 EMERGENCY DEPT VISIT HI MDM: CPT | Mod: 25 | Performed by: EMERGENCY MEDICINE

## 2021-06-02 PROCEDURE — 96375 TX/PRO/DX INJ NEW DRUG ADDON: CPT | Performed by: EMERGENCY MEDICINE

## 2021-06-02 PROCEDURE — 96366 THER/PROPH/DIAG IV INF ADDON: CPT | Performed by: EMERGENCY MEDICINE

## 2021-06-02 PROCEDURE — 250N000011 HC RX IP 250 OP 636: Performed by: EMERGENCY MEDICINE

## 2021-06-02 PROCEDURE — U0003 INFECTIOUS AGENT DETECTION BY NUCLEIC ACID (DNA OR RNA); SEVERE ACUTE RESPIRATORY SYNDROME CORONAVIRUS 2 (SARS-COV-2) (CORONAVIRUS DISEASE [COVID-19]), AMPLIFIED PROBE TECHNIQUE, MAKING USE OF HIGH THROUGHPUT TECHNOLOGIES AS DESCRIBED BY CMS-2020-01-R: HCPCS | Performed by: EMERGENCY MEDICINE

## 2021-06-02 PROCEDURE — 71046 X-RAY EXAM CHEST 2 VIEWS: CPT

## 2021-06-02 PROCEDURE — 85730 THROMBOPLASTIN TIME PARTIAL: CPT | Performed by: EMERGENCY MEDICINE

## 2021-06-02 PROCEDURE — 99223 1ST HOSP IP/OBS HIGH 75: CPT | Mod: AI | Performed by: INTERNAL MEDICINE

## 2021-06-02 PROCEDURE — C9803 HOPD COVID-19 SPEC COLLECT: HCPCS | Performed by: EMERGENCY MEDICINE

## 2021-06-02 PROCEDURE — 36415 COLL VENOUS BLD VENIPUNCTURE: CPT | Performed by: STUDENT IN AN ORGANIZED HEALTH CARE EDUCATION/TRAINING PROGRAM

## 2021-06-02 PROCEDURE — 93010 ELECTROCARDIOGRAM REPORT: CPT | Mod: 59 | Performed by: INTERNAL MEDICINE

## 2021-06-02 PROCEDURE — 71046 X-RAY EXAM CHEST 2 VIEWS: CPT | Mod: 26 | Performed by: RADIOLOGY

## 2021-06-02 PROCEDURE — U0005 INFEC AGEN DETEC AMPLI PROBE: HCPCS | Performed by: EMERGENCY MEDICINE

## 2021-06-02 PROCEDURE — 96365 THER/PROPH/DIAG IV INF INIT: CPT | Performed by: EMERGENCY MEDICINE

## 2021-06-02 RX ORDER — TIOTROPIUM BROMIDE 18 UG/1
18 CAPSULE ORAL; RESPIRATORY (INHALATION) DAILY
Status: DISCONTINUED | OUTPATIENT
Start: 2021-06-02 | End: 2021-06-03

## 2021-06-02 RX ORDER — ALBUTEROL SULFATE 90 UG/1
2 AEROSOL, METERED RESPIRATORY (INHALATION) EVERY 6 HOURS PRN
Status: DISCONTINUED | OUTPATIENT
Start: 2021-06-02 | End: 2021-06-04 | Stop reason: HOSPADM

## 2021-06-02 RX ORDER — MAGNESIUM HYDROXIDE/ALUMINUM HYDROXICE/SIMETHICONE 120; 1200; 1200 MG/30ML; MG/30ML; MG/30ML
30 SUSPENSION ORAL EVERY 4 HOURS PRN
Status: DISCONTINUED | OUTPATIENT
Start: 2021-06-02 | End: 2021-06-04 | Stop reason: HOSPADM

## 2021-06-02 RX ORDER — HEPARIN SODIUM 10000 [USP'U]/100ML
0-5000 INJECTION, SOLUTION INTRAVENOUS CONTINUOUS
Status: DISCONTINUED | OUTPATIENT
Start: 2021-06-02 | End: 2021-06-02

## 2021-06-02 RX ORDER — ACETAMINOPHEN 325 MG/1
650 TABLET ORAL EVERY 4 HOURS PRN
Status: DISCONTINUED | OUTPATIENT
Start: 2021-06-02 | End: 2021-06-04 | Stop reason: HOSPADM

## 2021-06-02 RX ORDER — OXYCODONE HYDROCHLORIDE 5 MG/1
5 TABLET ORAL EVERY 8 HOURS PRN
Status: DISCONTINUED | OUTPATIENT
Start: 2021-06-02 | End: 2021-06-04 | Stop reason: HOSPADM

## 2021-06-02 RX ORDER — POLYETHYLENE GLYCOL 3350 17 G/17G
17 POWDER, FOR SOLUTION ORAL DAILY PRN
Status: DISCONTINUED | OUTPATIENT
Start: 2021-06-02 | End: 2021-06-04 | Stop reason: HOSPADM

## 2021-06-02 RX ORDER — ATORVASTATIN CALCIUM 40 MG/1
40 TABLET, FILM COATED ORAL AT BEDTIME
Status: DISCONTINUED | OUTPATIENT
Start: 2021-06-02 | End: 2021-06-04 | Stop reason: HOSPADM

## 2021-06-02 RX ORDER — ACETAMINOPHEN 650 MG/1
650 SUPPOSITORY RECTAL EVERY 4 HOURS PRN
Status: DISCONTINUED | OUTPATIENT
Start: 2021-06-02 | End: 2021-06-04 | Stop reason: HOSPADM

## 2021-06-02 RX ORDER — HEPARIN SODIUM 10000 [USP'U]/100ML
0-5000 INJECTION, SOLUTION INTRAVENOUS CONTINUOUS
Status: DISCONTINUED | OUTPATIENT
Start: 2021-06-02 | End: 2021-06-03

## 2021-06-02 RX ORDER — LIDOCAINE 40 MG/G
CREAM TOPICAL
Status: DISCONTINUED | OUTPATIENT
Start: 2021-06-02 | End: 2021-06-04 | Stop reason: HOSPADM

## 2021-06-02 RX ORDER — ASPIRIN 81 MG/1
81 TABLET, CHEWABLE ORAL AT BEDTIME
Status: DISCONTINUED | OUTPATIENT
Start: 2021-06-02 | End: 2021-06-04 | Stop reason: HOSPADM

## 2021-06-02 RX ORDER — ASPIRIN 81 MG/1
324 TABLET, CHEWABLE ORAL ONCE
Status: DISCONTINUED | OUTPATIENT
Start: 2021-06-02 | End: 2021-06-03

## 2021-06-02 RX ORDER — NITROGLYCERIN 0.4 MG/1
0.4 TABLET SUBLINGUAL EVERY 5 MIN PRN
Status: DISCONTINUED | OUTPATIENT
Start: 2021-06-02 | End: 2021-06-04 | Stop reason: HOSPADM

## 2021-06-02 RX ADMIN — NITROGLYCERIN 0.4 MG: 0.4 TABLET SUBLINGUAL at 22:04

## 2021-06-02 RX ADMIN — ACETAMINOPHEN 650 MG: 325 TABLET, FILM COATED ORAL at 21:19

## 2021-06-02 RX ADMIN — ATORVASTATIN CALCIUM 40 MG: 40 TABLET, FILM COATED ORAL at 21:19

## 2021-06-02 RX ADMIN — HEPARIN SODIUM 1050 UNITS/HR: 10000 INJECTION, SOLUTION INTRAVENOUS at 14:13

## 2021-06-02 RX ADMIN — ASPIRIN 81 MG: 81 TABLET, DELAYED RELEASE ORAL at 21:18

## 2021-06-02 ASSESSMENT — ACTIVITIES OF DAILY LIVING (ADL): ADLS_ACUITY_SCORE: 19
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
## 2021-06-02 NOTE — LETTER
Health Information Management Services               Recipient:  [facility]        Sender:  ANA Valdez, LICSW  6C   [facility]  Phone [phone]  RN Station Ph [phone]        Date: June 4, 2021  Patient Name:  Kim Anne  Routing Message:    Discharge orders from hospitalization. Thanks!        The documents accompanying this notice contain confidential information belonging to the sender.  This information is intended only for the use of the individual or entity named above.  The authorized recipient of this information is prohibited from disclosing this information to any other party and is required to destroy the information after its stated need has been fulfilled, unless otherwise required by state law.      If you are not the intended recipient, you are hereby notified that any disclosure, copy, distribution or action taken in reliance on the contents of these documents is strictly prohibited.  If you have received this document in error, please return it by fax to [phone] with a note on the cover sheet explaining why you are returning it (e.g. not your patient, not your provider, etc.).  If you need further assistance, please call [facility] Centralized Transcription at [phone].  Documents may also be returned by mail to [employer], , [facility] [address], [place], [place] [address].

## 2021-06-02 NOTE — PLAN OF CARE
D: Admitted s/p chest pain   PMH of ESRD, HTN, hypothyroidism, dementia, COPD, TB, and mild CAD     I: Monitored vitals and assessed pt status.     Running: Heparin 1050 units/hr, 10.5 ml/hr     A: A0x2. Disoriented to time, place, and situation. Afebrile. VSS. HRs 50s. Sinus bradycardia w/arythmias. Per monitor tech. Pt in and out of A-flutter. EKG completed and MD paged. Sats >92% on 2L NC. Pt denies any shortness of breath at rest, chest pain/palpitaitons, nausea, dizziness, or chills. Fistula on right arm, dressing in place.  Heparin gtt infusing, Hep 10a level recheck @ 2000. Preventative mepilex on coccyx. Pt up w/assist x2 w/ gait belt and walker.          P: Continue to monitor pt status and notify Cards 1 treatment team with any changes or concerns.

## 2021-06-02 NOTE — ED TRIAGE NOTES
BIBA from dialysis clinic. Per EMS, pt with chest pain at end of dialysis run. This is normal for pt who often has this kind of pain at the end of runs. Pt states like this feels like that same chest pain. 324mg asa given by medics. More comfortable now versus initial presentation to medics. Comes from nursing home.

## 2021-06-02 NOTE — PLAN OF CARE
Belongings brought to 6C  Duffle bag  Ear phones   Yessica Swain  Sweatshirt  Shoes  Charm bracelalejandro  Rubber coiled bracelet  Watch    Patient declines to send any belongings to security
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
## 2021-06-03 ENCOUNTER — APPOINTMENT (OUTPATIENT)
Dept: CARDIOLOGY | Facility: CLINIC | Age: 76
DRG: 280 | End: 2021-06-03
Attending: PHYSICIAN ASSISTANT
Payer: COMMERCIAL

## 2021-06-03 LAB
ABO + RH BLD: NORMAL
ABO + RH BLD: NORMAL
ALBUMIN SERPL-MCNC: 2.6 G/DL (ref 3.4–5)
ALP SERPL-CCNC: 228 U/L (ref 40–150)
ALT SERPL W P-5'-P-CCNC: 20 U/L (ref 0–50)
ANION GAP SERPL CALCULATED.3IONS-SCNC: 6 MMOL/L (ref 3–14)
AST SERPL W P-5'-P-CCNC: 17 U/L (ref 0–45)
BILIRUB DIRECT SERPL-MCNC: 0.2 MG/DL (ref 0–0.2)
BILIRUB SERPL-MCNC: 0.4 MG/DL (ref 0.2–1.3)
BLD GP AB SCN SERPL QL: NORMAL
BLD PROD TYP BPU: NORMAL
BLD PROD TYP BPU: NORMAL
BLD UNIT ID BPU: 0
BLOOD BANK CMNT PATIENT-IMP: NORMAL
BLOOD PRODUCT CODE: NORMAL
BPU ID: NORMAL
BUN SERPL-MCNC: 38 MG/DL (ref 7–30)
CALCIUM SERPL-MCNC: 8.7 MG/DL (ref 8.5–10.1)
CHLORIDE SERPL-SCNC: 100 MMOL/L (ref 94–109)
CO2 SERPL-SCNC: 27 MMOL/L (ref 20–32)
CREAT SERPL-MCNC: 3.33 MG/DL (ref 0.52–1.04)
ERYTHROCYTE [DISTWIDTH] IN BLOOD BY AUTOMATED COUNT: 18.4 % (ref 10–15)
FERRITIN SERPL-MCNC: 358 NG/ML (ref 8–252)
FOLATE SERPL-MCNC: 14.8 NG/ML
GFR SERPL CREATININE-BSD FRML MDRD: 13 ML/MIN/{1.73_M2}
GLUCOSE SERPL-MCNC: 85 MG/DL (ref 70–99)
HCT VFR BLD AUTO: 21.7 % (ref 35–47)
HGB BLD-MCNC: 6.4 G/DL (ref 11.7–15.7)
HGB BLD-MCNC: 6.6 G/DL (ref 11.7–15.7)
HGB BLD-MCNC: 7.8 G/DL (ref 11.7–15.7)
HGB BLD-MCNC: 7.9 G/DL (ref 11.7–15.7)
HGB BLD-MCNC: 8.2 G/DL (ref 11.7–15.7)
INR PPP: 1.29 (ref 0.86–1.14)
INTERPRETATION ECG - MUSE: NORMAL
IRON SATN MFR SERPL: 7 % (ref 15–46)
IRON SERPL-MCNC: 27 UG/DL (ref 35–180)
LDH SERPL L TO P-CCNC: 186 U/L (ref 81–234)
MCH RBC QN AUTO: 32.3 PG (ref 26.5–33)
MCHC RBC AUTO-ENTMCNC: 29.5 G/DL (ref 31.5–36.5)
MCV RBC AUTO: 110 FL (ref 78–100)
NUM BPU REQUESTED: 1
PLATELET # BLD AUTO: 199 10E9/L (ref 150–450)
POTASSIUM SERPL-SCNC: 4 MMOL/L (ref 3.4–5.3)
PROT SERPL-MCNC: 6.2 G/DL (ref 6.8–8.8)
RBC # BLD AUTO: 1.98 10E12/L (ref 3.8–5.2)
RETICS # AUTO: 74.3 10E9/L (ref 25–95)
RETICS/RBC NFR AUTO: 3.6 % (ref 0.5–2)
SODIUM SERPL-SCNC: 133 MMOL/L (ref 133–144)
SPECIMEN EXP DATE BLD: NORMAL
TIBC SERPL-MCNC: 395 UG/DL (ref 240–430)
TRANSFERRIN SERPL-MCNC: 190 MG/DL (ref 210–360)
TRANSFUSION STATUS PATIENT QL: NORMAL
TRANSFUSION STATUS PATIENT QL: NORMAL
UFH PPP CHRO-ACNC: 0.36 IU/ML
VIT B12 SERPL-MCNC: 361 PG/ML (ref 193–986)
WBC # BLD AUTO: 5 10E9/L (ref 4–11)

## 2021-06-03 PROCEDURE — 999N000127 HC STATISTIC PERIPHERAL IV START W US GUIDANCE

## 2021-06-03 PROCEDURE — 250N000013 HC RX MED GY IP 250 OP 250 PS 637: Performed by: PHYSICIAN ASSISTANT

## 2021-06-03 PROCEDURE — 36416 COLLJ CAPILLARY BLOOD SPEC: CPT | Performed by: PHYSICIAN ASSISTANT

## 2021-06-03 PROCEDURE — 85018 HEMOGLOBIN: CPT | Performed by: STUDENT IN AN ORGANIZED HEALTH CARE EDUCATION/TRAINING PROGRAM

## 2021-06-03 PROCEDURE — 36415 COLL VENOUS BLD VENIPUNCTURE: CPT | Performed by: INTERNAL MEDICINE

## 2021-06-03 PROCEDURE — 36415 COLL VENOUS BLD VENIPUNCTURE: CPT | Performed by: STUDENT IN AN ORGANIZED HEALTH CARE EDUCATION/TRAINING PROGRAM

## 2021-06-03 PROCEDURE — 83615 LACTATE (LD) (LDH) ENZYME: CPT | Performed by: INTERNAL MEDICINE

## 2021-06-03 PROCEDURE — 82607 VITAMIN B-12: CPT | Performed by: INTERNAL MEDICINE

## 2021-06-03 PROCEDURE — 86901 BLOOD TYPING SEROLOGIC RH(D): CPT | Performed by: PHYSICIAN ASSISTANT

## 2021-06-03 PROCEDURE — 82728 ASSAY OF FERRITIN: CPT | Performed by: STUDENT IN AN ORGANIZED HEALTH CARE EDUCATION/TRAINING PROGRAM

## 2021-06-03 PROCEDURE — 86900 BLOOD TYPING SEROLOGIC ABO: CPT | Performed by: PHYSICIAN ASSISTANT

## 2021-06-03 PROCEDURE — 250N000013 HC RX MED GY IP 250 OP 250 PS 637: Performed by: INTERNAL MEDICINE

## 2021-06-03 PROCEDURE — 214N000001 HC R&B CCU UMMC

## 2021-06-03 PROCEDURE — 93306 TTE W/DOPPLER COMPLETE: CPT

## 2021-06-03 PROCEDURE — 80076 HEPATIC FUNCTION PANEL: CPT | Performed by: STUDENT IN AN ORGANIZED HEALTH CARE EDUCATION/TRAINING PROGRAM

## 2021-06-03 PROCEDURE — 250N000013 HC RX MED GY IP 250 OP 250 PS 637: Performed by: STUDENT IN AN ORGANIZED HEALTH CARE EDUCATION/TRAINING PROGRAM

## 2021-06-03 PROCEDURE — 80048 BASIC METABOLIC PNL TOTAL CA: CPT | Performed by: INTERNAL MEDICINE

## 2021-06-03 PROCEDURE — 85045 AUTOMATED RETICULOCYTE COUNT: CPT | Performed by: PHYSICIAN ASSISTANT

## 2021-06-03 PROCEDURE — 93306 TTE W/DOPPLER COMPLETE: CPT | Mod: 26 | Performed by: STUDENT IN AN ORGANIZED HEALTH CARE EDUCATION/TRAINING PROGRAM

## 2021-06-03 PROCEDURE — 85520 HEPARIN ASSAY: CPT | Performed by: INTERNAL MEDICINE

## 2021-06-03 PROCEDURE — 83540 ASSAY OF IRON: CPT | Performed by: INTERNAL MEDICINE

## 2021-06-03 PROCEDURE — 86923 COMPATIBILITY TEST ELECTRIC: CPT | Performed by: PHYSICIAN ASSISTANT

## 2021-06-03 PROCEDURE — P9016 RBC LEUKOCYTES REDUCED: HCPCS | Performed by: PHYSICIAN ASSISTANT

## 2021-06-03 PROCEDURE — 85610 PROTHROMBIN TIME: CPT | Performed by: STUDENT IN AN ORGANIZED HEALTH CARE EDUCATION/TRAINING PROGRAM

## 2021-06-03 PROCEDURE — 250N000011 HC RX IP 250 OP 636: Performed by: EMERGENCY MEDICINE

## 2021-06-03 PROCEDURE — 84466 ASSAY OF TRANSFERRIN: CPT | Performed by: PHYSICIAN ASSISTANT

## 2021-06-03 PROCEDURE — 99233 SBSQ HOSP IP/OBS HIGH 50: CPT | Mod: 25 | Performed by: INTERNAL MEDICINE

## 2021-06-03 PROCEDURE — 82746 ASSAY OF FOLIC ACID SERUM: CPT | Performed by: INTERNAL MEDICINE

## 2021-06-03 PROCEDURE — 85027 COMPLETE CBC AUTOMATED: CPT | Performed by: INTERNAL MEDICINE

## 2021-06-03 PROCEDURE — 83010 ASSAY OF HAPTOGLOBIN QUANT: CPT | Performed by: PHYSICIAN ASSISTANT

## 2021-06-03 PROCEDURE — 83550 IRON BINDING TEST: CPT | Performed by: INTERNAL MEDICINE

## 2021-06-03 PROCEDURE — 99223 1ST HOSP IP/OBS HIGH 75: CPT | Mod: GC | Performed by: INTERNAL MEDICINE

## 2021-06-03 PROCEDURE — 86850 RBC ANTIBODY SCREEN: CPT | Performed by: PHYSICIAN ASSISTANT

## 2021-06-03 PROCEDURE — 85018 HEMOGLOBIN: CPT | Performed by: PHYSICIAN ASSISTANT

## 2021-06-03 PROCEDURE — 36415 COLL VENOUS BLD VENIPUNCTURE: CPT | Performed by: PHYSICIAN ASSISTANT

## 2021-06-03 RX ORDER — MIDODRINE HYDROCHLORIDE 5 MG/1
10 TABLET ORAL
Status: DISCONTINUED | OUTPATIENT
Start: 2021-06-04 | End: 2021-06-03

## 2021-06-03 RX ORDER — PANTOPRAZOLE SODIUM 40 MG/1
40 TABLET, DELAYED RELEASE ORAL 2 TIMES DAILY
Status: DISCONTINUED | OUTPATIENT
Start: 2021-06-03 | End: 2021-06-04 | Stop reason: HOSPADM

## 2021-06-03 RX ORDER — MIDODRINE HYDROCHLORIDE 5 MG/1
20 TABLET ORAL
Status: DISCONTINUED | OUTPATIENT
Start: 2021-06-04 | End: 2021-06-04 | Stop reason: HOSPADM

## 2021-06-03 RX ADMIN — PANTOPRAZOLE SODIUM 40 MG: 40 TABLET, DELAYED RELEASE ORAL at 20:18

## 2021-06-03 RX ADMIN — UMECLIDINIUM 1 PUFF: 62.5 AEROSOL, POWDER ORAL at 14:22

## 2021-06-03 RX ADMIN — Medication 10 MG: at 23:12

## 2021-06-03 RX ADMIN — ATORVASTATIN CALCIUM 40 MG: 40 TABLET, FILM COATED ORAL at 22:07

## 2021-06-03 RX ADMIN — ASPIRIN 81 MG: 81 TABLET, DELAYED RELEASE ORAL at 22:07

## 2021-06-03 RX ADMIN — FLUTICASONE PROPIONATE AND SALMETEROL 1 PUFF: 50; 250 POWDER RESPIRATORY (INHALATION) at 02:39

## 2021-06-03 RX ADMIN — ACETAMINOPHEN 650 MG: 325 TABLET, FILM COATED ORAL at 16:54

## 2021-06-03 RX ADMIN — LEVOTHYROXINE SODIUM 175 MCG: 50 TABLET ORAL at 08:13

## 2021-06-03 RX ADMIN — TIOTROPIUM BROMIDE 18 MCG: 18 CAPSULE ORAL; RESPIRATORY (INHALATION) at 11:15

## 2021-06-03 RX ADMIN — HEPARIN SODIUM 1050 UNITS/HR: 10000 INJECTION, SOLUTION INTRAVENOUS at 09:40

## 2021-06-03 RX ADMIN — ACETAMINOPHEN 650 MG: 325 TABLET, FILM COATED ORAL at 08:22

## 2021-06-03 RX ADMIN — ALBUTEROL SULFATE 2 PUFF: 90 AEROSOL, METERED RESPIRATORY (INHALATION) at 21:20

## 2021-06-03 RX ADMIN — FLUTICASONE FUROATE AND VILANTEROL TRIFENATATE 1 PUFF: 200; 25 POWDER RESPIRATORY (INHALATION) at 12:56

## 2021-06-03 RX ADMIN — PANTOPRAZOLE SODIUM 40 MG: 40 TABLET, DELAYED RELEASE ORAL at 12:56

## 2021-06-03 RX ADMIN — ACETAMINOPHEN 650 MG: 325 TABLET, FILM COATED ORAL at 01:27

## 2021-06-03 ASSESSMENT — ACTIVITIES OF DAILY LIVING (ADL)
ADLS_ACUITY_SCORE: 19
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
## 2021-06-03 NOTE — PROVIDER NOTIFICATION
Cards 1 notified of patient going from SR 80-90s to SB in 45-50s frequently.     No new changes or orders per MD.    Emeli Desai RN

## 2021-06-03 NOTE — PROGRESS NOTES
[facility]   Cardiology Consults  Progress Note     Interval History:  - hgb 7.5-->6.6 this AM. No signs of bleeding on exam. Belly exam benign. Denies dizziness, lightheadedness. Unable to reach decision maker after 4 attempts. Given we are actively treating ACS, we will treat this as an emergency and transfuse to keep hgb >7  - denies chest pain this AM. Unable to recount events that led to her hospitalization. Cannot recall prior episodes of chest pain (although reported to admitting provider)  - SW unable to reach decision maker as well. No alternative listed at this time.     Physical Exam:  Temp:  [97.6  F (36.4  C)-98.5  F (36.9  C)] 97.7  F (36.5  C)  Pulse:  [56-91] 90  Resp:  [12-29] 20  BP: ()/(48-91) 97/57  SpO2:  [90 %-100 %] 90 %      Intake/Output Summary (Last 24 hours) at 6/3/2021 1111  Last data filed at 6/3/2021 0626  Gross per 24 hour   Intake 290.28 ml   Output --   Net 290.28 ml       Wt:   Wt Readings from Last 5 Encounters:   06/02/21 87.3 kg (192 lb 8 oz)   03/01/20 67.9 kg (149 lb 9.6 oz)   02/06/20 62 kg (136 lb 11 oz)   02/01/20 61.9 kg (136 lb 7.4 oz)   12/25/19 61.7 kg (136 lb)       GEN: NAD, awake, alert  Pulm: CTAB, no wheeze or crackles  Cardiac: RRR, JVP not appreciably elevated, no murmurs  Vascular: no lower extremity edema and palpable pulses  GI: soft, non distended, non-tender, active bowel sounds present.   Neuro: CN II-XII grossly intact, disoriented to place, date, situation    Medications:    aspirin  81 mg Oral At Bedtime     atorvastatin  40 mg Oral At Bedtime     fluticasone-salmeterol  1 puff Inhalation Q12H     levothyroxine  175 mcg Oral QAM AC     [START ON 6/4/2021] midodrine  10 mg Oral Once per day on Mon Wed Fri     sodium chloride (PF)  3 mL Intracatheter Q8H     tiotropium  18 mcg Inhalation Daily       heparin 1,050 Units/hr (06/03/21 6889)     - MEDICATION INSTRUCTIONS -       - MEDICATION INSTRUCTIONS -         Labs:    CMP  Recent Labs   Lab 06/03/21  0533 06/03/21 0349 06/02/21  1308   NA  --  133 134   POTASSIUM  --  4.0 3.5   CHLORIDE  --  100 98   CO2  --  27 32   ANIONGAP  --  6 4   GLC  --  85 163*   BUN  --  38* 28   CR  --  3.33* 2.63*   GFRESTIMATED  --  13* 17*   GFRESTBLACK  --  15* 20*   FRANCES  --  8.7 8.3*   PROTTOTAL 6.2*  --  6.7*   ALBUMIN 2.6*  --  2.9*   BILITOTAL 0.4  --  0.3   ALKPHOS 228*  --  258*   AST 17  --  31   ALT 20  --  20     CBC  Recent Labs   Lab 06/03/21 0533 06/03/21 0349 06/02/21  1308   WBC  --  5.0 7.7   RBC  --  1.98* 2.29*   HGB 6.6* 6.4* 7.5*   HCT  --  21.7* 25.6*   MCV  --  110* 112*   MCH  --  32.3 32.8   MCHC  --  29.5* 29.3*   RDW  --  18.4* 18.6*   PLT  --  199 194     INR  Recent Labs   Lab 06/03/21 0533 06/02/21  1308   INR 1.29* 1.16*     Arterial Blood GasNo lab results found in last 7 days.      ASSESSMENT/PLAN:  Kim Anne is a 75 year old female with a history of ESRD on HD (M/W/F) w/ hypoparathyroidism, hypocalcemia, HTN, hypothyroidism, dementia, COPD, hx of tuberculosis (s/p treatment in 1970s) and mild CAD on coronary angiogram from 5/20/2019. She presented to the ED on 6/2/21 after endorsing sharp, chest pain while at dialysis. Initial troponin in the ED was mildly elevated at 0.085. ECG revealed nonspecific ST and T wave changes in inferior and lateral leads. Admitted to cardiology for observation.      # Sharp, atypical chest pain, r/o ACS  # Elevated troponin   # History of mild CAD   Patient presented with rather atypical chest pain that was nonexertional, sharp in nature and self-limiting. This occurred near the end of dialysis so it could have been attributed from fluid shift or hemodynamic changes at that time. Her troponin was elevated on presentation at 0.085. This combined with ECG changes does raise some concern for ACS, so recommend trending troponin. However, she had a recent coronary angiogram with only minimal coronary artery disease.  Additionally, the nature of her pain is not classic for angina. The rise in her troponin could be demand ischemia from recent dialysis run as well as decreased clearance from ESRD, however, given multiple risk factors (including ESRD), will treat as NSTEMI. Given she is chest pain free and trop is downtrending, and we are unable to reach her decision maker, we will treat medically for now.   - echocardiogram to assess for decreased LVEF and RWMA  - continue heparin   - continue aspirin 81 mg   - atorvastatin 40 mg daily  - telemetry    #Acute on chronic macrocytic anemia  Presenting hgb 7.5, now 6.6 on HOD 1. Abdominal exam benign. Bili WNL, iron studies with low iron, high ferritin. B12 and folate WNL. No clear signs of bleeding on exam, however, given chronic disease would have c/f subacute GI bleed. Attempted to contact guardian x4, however, rings to voicemail that is not set up. Given treatment of ACS as above, will transfuse as emergency and continue to monitor closely  - add on hapto, retic, transferrin, LDH  - FOBT   - repeat HGB this afternoon s/p transfusion of unit  - pantoprazole 40 bid    # ESRD on HD M/W/F  - Nephro consult for HD anticipating she will be here a few days     # COPD  - continue PTA inhalers and Spiriva     # Hypothyroidism  Continue PTA levothyroxine     # Dementia  Alternative decision maker is sister, Vandana. Unable to reach her (phone rings, but no voicemail to leave message). Per , outpatient dialysis reporting similar issue. Evidently had court appointed guardian during prior [facility] admission, which was since switched to Vandana. Her children do not appear to be involved in her care per prior notation.    Patient seen and discussed with Dr. Roque, who agrees with above plan.      Efrain Mccain PA-C  [facility] Cardiology Team    I very much appreciated the opportunity to see and assess Kim Anne in the hospital with Efrain Mccain PA-C.    Patient was seen initially in  the ED yesterday at which time she had completed dialysis and had sharp pain.  Troponin was mildly elevated and she was admitted to the cardiology service.  Patient is at times seemingly confused.  Attempts were made to contact her legal guardian.  For now we will not intend to undertake any invasive procedures but may consider Lexiscan.      I agree with the note above which summarizes my findings and current recommendations.  Please do not hesitate to contact my office if you have any questions or concerns.      Serge Roque MD  Cardiac Arrhythmia Service  [facility]  [phone]

## 2021-06-03 NOTE — PROGRESS NOTES
/62 (BP Location: Left arm)   Pulse 91   Temp 97.9  F (36.6  C) (Oral)   Resp 18   Wt 87.3 kg (192 lb 8 oz)   SpO2 93%   BMI 32.03 kg/m       Neuro: confused and forgetful( Pt has dementia)  Cardiac: Afebrile, VSS.   Respiratory: Weaned to RA   GI/: Voiding spontaneously. Small BM this shift.   Diet/appetite: Tolerating diet. Denies nausea   Activity: Up SBA    Pain: tylenol given x2 for headache.  Skin: No new deficits noted.  Lines: PIV x2   Drains: None  Received one unit, Hgb now 7.8. No new complaints.Will continue to monitor and follow plan of care.

## 2021-06-03 NOTE — PLAN OF CARE
D: Admitted 6/2 with chest pain . Hx of ESRD on HD (MWF), hypocalcemia, HTN, hypothyroidism, dementia, COPD, hx of TB, MI, and mild CAD.     I: Monitored vitals and assessed pt status.   Changed:  Running: Heparin 1050 units/hr   PRN: tylenol; nitroglycerin     A: A0x2, disoriented to place and situation. Confused at baseline. SB/SR in 50s-90s, runs of Aflutter noted. 2 L O2 NC (baseline). Afebrile. No urine output. Patient doesn't recall when last BM was. Up with assist of 1-2. Pain in left chest area, prn tylenol and nitroglycerin for some relief, intermittent pain. Renal diet. Mepilex on coccyx for preventative. Bed alarm on for safety. BP on leg. Right limb alert for fistula.     P: Continue to monitor Pt status and report changes to Cards 1.

## 2021-06-03 NOTE — CONSULTS
Care Management Initial Consult    General Information  Assessment completed with: Chart review, Other (LTC liaison Antonia, ph [phone])   Type of CM/SW Visit: Initial Assessment  Primary Care Provider verified and updated as needed: No   Readmission within the last 30 days: no previous admission in last 30 days   Reason for Consult: discharge planning  Advance Care Planning: Advance Care Planning Reviewed: (pt has HCD on file but now has legal guardian)        Communication Assessment  Patient's communication style: spoken language (English or Bilingual)    Hearing Difficulty or Deaf: no   Wear Glasses or Blind: yes    Cognitive  Cognitive/Neuro/Behavioral: .WDL except  Level of Consciousness: confused  Arousal Level: opens eyes spontaneously  Orientation: disoriented x 4, situation, place  Mood/Behavior: cooperative  Best Language: 0 - No aphasia  Speech: spontaneous, clear    Living Environment:   People in home: facility resident     Current living Arrangements: extended care facility- secure dementia LTC unit  Name of Facility: [facility]   Able to return to prior arrangements: yes     Family/Social Support:  Care provided by: LTC staff  Provides care for: no one, unable/limited ability to care for self  Support system: Sibling(s), Facility resident(s)/Staff          Description of Support System: unable to reach pt's sister who is her guardian  Support Assessment: currently unable to reach legal guardian    Current Resources:   Patient receiving home care services: LTC resident  Community Resources: Dialysis Services, Skilled Nursing Facility  Equipment currently used at home: cane, straight  Supplies currently used at home:  LTC resident    Employment/Financial:  Employment Status: not discussed     Financial Concerns: No concerns identified         Lifestyle & Psychosocial Needs:        Socioeconomic History     Marital status: Single     Spouse name: Not on file     Number of children:  Not on file     Years of education: Not on file     Highest education level: Not on file     Tobacco Use     Smoking status: Heavy Tobacco Smoker     Packs/day: 0.50     Years: 50.00     Pack years: 25.00     Types: Cigarettes     Smokeless tobacco: Never Used   Substance and Sexual Activity     Alcohol use: No     Alcohol/week: 0.0 standard drinks     Drug use: No     Sexual activity: Not Currently     Partners: Female     Birth control/protection: Abstinence       Functional Status:  Prior to admission patient needed assistance:   Dependent ADLs: LTC resident  Dependent IADLs: LTC resident  Assesssment of Functional Status: resides in memory care LT    Values/Beliefs:  Spiritual, Cultural Beliefs, Jew Practices, Values that affect care: (not discussed)               Additional Information:  Pt is a 75-year-old female admitted to [facility] 6/2/21 after endorsing sharp chest pain while at dialysis.  consult received to establish pt's decision maker.    Pt resides in [facility] care LT at [facility]. [facility] medical record has healthcare directive on file naming pt's children Curtis and April as healthcare agents, but other records indicate pt has a legal guardian. Spoke with facility liaison Antonia who confirms pt has guardian and requested paperwork be sent to [facility]. Received court paperwork indicating pt's sister Vandana Allen is pt's legal guardian. [facility] provided phone numbers for Vandana as [phone] and [phone]. Sent paperwork to Tiffanie Blood for validation and requested chart be updated.    Called Vandana at [phone]- number not in service. Provider has been calling [phone] and there is no answer with no ability to leave . Called number as well and  box is full.    Called all of pt's emergency contacts and numbers on healthcare directive. Relative Angelina Sethi's number is out of service (ph [phone]). Relative Ginette Maki's number rings with no voicemail available  (ph [phone]). Pt's children Ki and April's number is out of service (ph [phone]).    Left VM for facility liaison Antonia requesting any additional phone numbers for any contacts they may have.    ANA Valdez, LICSW  6C   [facility]  Pager [phone]  Phone [phone]    Addendum 3:29pm: Left VM for LTC  [facility] (ph [phone]) requesting any additional contacts for pt. Received call back from [facility] providing same number for Vandana that has full voicemail ([phone]). [facility] states they have had difficulty reaching Vandana at times but that typically they are eventually able to reach her and it hasn't been an issue.

## 2021-06-04 VITALS
DIASTOLIC BLOOD PRESSURE: 76 MMHG | WEIGHT: 186.29 LBS | BODY MASS INDEX: 31 KG/M2 | HEART RATE: 55 BPM | RESPIRATION RATE: 24 BRPM | TEMPERATURE: 97.3 F | OXYGEN SATURATION: 100 % | SYSTOLIC BLOOD PRESSURE: 117 MMHG

## 2021-06-04 LAB
ANION GAP SERPL CALCULATED.3IONS-SCNC: 10 MMOL/L (ref 3–14)
BUN SERPL-MCNC: 51 MG/DL (ref 7–30)
CALCIUM SERPL-MCNC: 8.6 MG/DL (ref 8.5–10.1)
CHLORIDE SERPL-SCNC: 98 MMOL/L (ref 94–109)
CO2 SERPL-SCNC: 27 MMOL/L (ref 20–32)
CREAT SERPL-MCNC: 4.31 MG/DL (ref 0.52–1.04)
ERYTHROCYTE [DISTWIDTH] IN BLOOD BY AUTOMATED COUNT: 20.2 % (ref 10–15)
GFR SERPL CREATININE-BSD FRML MDRD: 9 ML/MIN/{1.73_M2}
GLUCOSE SERPL-MCNC: 117 MG/DL (ref 70–99)
HAPTOGLOB SERPL-MCNC: 153 MG/DL (ref 32–197)
HBV SURFACE AB SERPL IA-ACNC: 149.41 M[IU]/ML
HBV SURFACE AG SERPL QL IA: NONREACTIVE
HCT VFR BLD AUTO: 25.2 % (ref 35–47)
HGB BLD-MCNC: 7.8 G/DL (ref 11.7–15.7)
MCH RBC QN AUTO: 32.1 PG (ref 26.5–33)
MCHC RBC AUTO-ENTMCNC: 31 G/DL (ref 31.5–36.5)
MCV RBC AUTO: 104 FL (ref 78–100)
PLATELET # BLD AUTO: 194 10E9/L (ref 150–450)
POTASSIUM SERPL-SCNC: 4 MMOL/L (ref 3.4–5.3)
RBC # BLD AUTO: 2.43 10E12/L (ref 3.8–5.2)
SODIUM SERPL-SCNC: 134 MMOL/L (ref 133–144)
UFH PPP CHRO-ACNC: <0.1 IU/ML
WBC # BLD AUTO: 6.4 10E9/L (ref 4–11)

## 2021-06-04 PROCEDURE — 80048 BASIC METABOLIC PNL TOTAL CA: CPT | Performed by: INTERNAL MEDICINE

## 2021-06-04 PROCEDURE — 87340 HEPATITIS B SURFACE AG IA: CPT | Performed by: INTERNAL MEDICINE

## 2021-06-04 PROCEDURE — 250N000011 HC RX IP 250 OP 636: Performed by: INTERNAL MEDICINE

## 2021-06-04 PROCEDURE — 250N000013 HC RX MED GY IP 250 OP 250 PS 637: Performed by: PHYSICIAN ASSISTANT

## 2021-06-04 PROCEDURE — 85027 COMPLETE CBC AUTOMATED: CPT | Performed by: INTERNAL MEDICINE

## 2021-06-04 PROCEDURE — 90937 HEMODIALYSIS REPEATED EVAL: CPT

## 2021-06-04 PROCEDURE — 258N000003 HC RX IP 258 OP 636: Performed by: INTERNAL MEDICINE

## 2021-06-04 PROCEDURE — 250N000009 HC RX 250: Performed by: INTERNAL MEDICINE

## 2021-06-04 PROCEDURE — 85520 HEPARIN ASSAY: CPT | Performed by: INTERNAL MEDICINE

## 2021-06-04 PROCEDURE — 36415 COLL VENOUS BLD VENIPUNCTURE: CPT | Performed by: INTERNAL MEDICINE

## 2021-06-04 PROCEDURE — 99239 HOSP IP/OBS DSCHRG MGMT >30: CPT | Performed by: INTERNAL MEDICINE

## 2021-06-04 PROCEDURE — 99233 SBSQ HOSP IP/OBS HIGH 50: CPT | Mod: GC | Performed by: INTERNAL MEDICINE

## 2021-06-04 PROCEDURE — 634N000001 HC RX 634: Performed by: INTERNAL MEDICINE

## 2021-06-04 PROCEDURE — 250N000013 HC RX MED GY IP 250 OP 250 PS 637: Performed by: INTERNAL MEDICINE

## 2021-06-04 PROCEDURE — 86706 HEP B SURFACE ANTIBODY: CPT | Performed by: INTERNAL MEDICINE

## 2021-06-04 RX ORDER — CLOPIDOGREL BISULFATE 75 MG/1
300 TABLET ORAL ONCE
Status: COMPLETED | OUTPATIENT
Start: 2021-06-04 | End: 2021-06-04

## 2021-06-04 RX ORDER — CLOPIDOGREL BISULFATE 75 MG/1
75 TABLET ORAL DAILY
DISCHARGE
Start: 2021-06-05 | End: 2022-01-01

## 2021-06-04 RX ORDER — CLOPIDOGREL BISULFATE 75 MG/1
75 TABLET ORAL DAILY
Status: DISCONTINUED | OUTPATIENT
Start: 2021-06-05 | End: 2021-06-04 | Stop reason: HOSPADM

## 2021-06-04 RX ORDER — PANTOPRAZOLE SODIUM 40 MG/1
40 TABLET, DELAYED RELEASE ORAL 2 TIMES DAILY
Qty: 60 TABLET | Refills: 3 | DISCHARGE
Start: 2021-06-04 | End: 2022-01-01

## 2021-06-04 RX ORDER — CLOPIDOGREL BISULFATE 75 MG/1
75 TABLET ORAL DAILY
Qty: 90 TABLET | Refills: 3 | Status: CANCELLED | OUTPATIENT
Start: 2021-06-05

## 2021-06-04 RX ADMIN — IRON SUCROSE 100 MG: 20 INJECTION, SOLUTION INTRAVENOUS at 13:52

## 2021-06-04 RX ADMIN — ACETAMINOPHEN 650 MG: 325 TABLET, FILM COATED ORAL at 08:13

## 2021-06-04 RX ADMIN — SODIUM CHLORIDE 100 ML: 9 INJECTION, SOLUTION INTRAVENOUS at 15:18

## 2021-06-04 RX ADMIN — SODIUM CHLORIDE 250 ML: 9 INJECTION, SOLUTION INTRAVENOUS at 13:49

## 2021-06-04 RX ADMIN — LIDOCAINE HYDROCHLORIDE 0.5 ML: 10 INJECTION, SOLUTION EPIDURAL; INFILTRATION; INTRACAUDAL; PERINEURAL at 13:30

## 2021-06-04 RX ADMIN — SODIUM CHLORIDE 300 ML: 9 INJECTION, SOLUTION INTRAVENOUS at 13:30

## 2021-06-04 RX ADMIN — PANTOPRAZOLE SODIUM 40 MG: 40 TABLET, DELAYED RELEASE ORAL at 08:13

## 2021-06-04 RX ADMIN — ACETAMINOPHEN 650 MG: 325 TABLET, FILM COATED ORAL at 02:54

## 2021-06-04 RX ADMIN — EPOETIN ALFA-EPBX 10000 UNITS: 10000 INJECTION, SOLUTION INTRAVENOUS; SUBCUTANEOUS at 13:49

## 2021-06-04 RX ADMIN — UMECLIDINIUM 1 PUFF: 62.5 AEROSOL, POWDER ORAL at 08:13

## 2021-06-04 RX ADMIN — CLOPIDOGREL BISULFATE 300 MG: 75 TABLET ORAL at 12:32

## 2021-06-04 RX ADMIN — SODIUM CHLORIDE 100 ML: 9 INJECTION, SOLUTION INTRAVENOUS at 15:06

## 2021-06-04 RX ADMIN — LEVOTHYROXINE SODIUM 175 MCG: 50 TABLET ORAL at 06:23

## 2021-06-04 RX ADMIN — Medication: at 13:50

## 2021-06-04 RX ADMIN — FLUTICASONE FUROATE AND VILANTEROL TRIFENATATE 1 PUFF: 200; 25 POWDER RESPIRATORY (INHALATION) at 08:13

## 2021-06-04 RX ADMIN — MIDODRINE HYDROCHLORIDE 20 MG: 5 TABLET ORAL at 06:22

## 2021-06-04 ASSESSMENT — ACTIVITIES OF DAILY LIVING (ADL)
ADLS_ACUITY_SCORE: 19

## 2021-06-04 NOTE — PLAN OF CARE
Major shift events:  Pt RA at baseline.  Placed patient on nasal cannula; anywhere from 2L - 4L; with max of 5L after using BR.  Audible wheezing  and exp crackles in upper bases noted.  Does better sitting in high-flowers position.  Desats to lower 80's when sleeping.  Requesting order or sleep study and HUMBERTO workup.    Pt remains A/O x 3 with intermittent confusion.  SBA with GB and walker. Remains mostly anuric.  Voided x 2 to BR with very minimal UO.      Plan of care:  HD run today.

## 2021-06-04 NOTE — DISCHARGE SUMMARY
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 Nephrology consulted for assistance with HD.      ESRD 2/2 diabetic nephropathy on HD MWF  Solitary right kidney s/p donation (1988)  The patient dialyzes MWF at [facility] in [place]. RUE AVF. Primary nephrologist is Dr. Barriga. She is not active on the transplant list. The patient completed all but 15-min of dialysis run on 6/2 per dialysis RN.   - Will have HD run today 6/4     Acute on chronic AoCD  The patient presented with Hb of 7.5 (around baseline) which decreased to 6.4. Recheck showed Hb of 6.4 --> 6.6. Concerning for possible bleed after being started on Heparin gtt, but she has no symptoms of bleeding and QUINN was negative for blood. Folate and B12 WNL. Iron panel with ferritin 358 and iron saturation 7% (decreased from previous despite Venofer infusions as outpatient).   - 100mg Venofer (7 of 10) and Epogen 10,000u IV during dialysis 6/4   - Consider outpatient GI referral for evaluation of chronic GI blood loss     Sharp, atypical chest pain, r/o ACS  Elevated troponin   History of mild CAD   Patient presented with rather atypical chest pain that was nonexertional, sharp in nature and self-limiting. This occurred near the end of dialysis so it could have been attributed from fluid shift or hemodynamic changes at that time. Her troponin was  elevated on presentation at 0.085. This combined with ECG changes does raise some concern for ACS, so recommend trending troponin. However, she had a recent coronary angiogram with only minimal coronary artery disease. Additionally, the nature of her pain is not classic for angina. The rise in her troponin could be demand ischemia from recent dialysis run as well as decreased clearance from ESRD, however, given multiple risk factors (including ESRD), will treat as NSTEMI. Given she is now chest pain free and trop is downtrending, and unable to reach her decision maker, we will treat medically for now.   - echocardiogram to assess for decreased LVEF and RWMA  - continue aspirin 81 mg   - atorvastatin 40 mg daily    [X] Resume all previous dialysis orders with exception as noted below    New Orders (if not applicable put NA):  Estimated Dry Weight As prior   Dialysis Duration As prior   Dialysis Access As prior   Antibiotics (dose per dialysis, end date) NA           Labs to be drawn at dialysis Per protocol   Other major changes to dialysis prescription (e.g. Dialysate bath, heparin, blood flow rate, etc)   NA   Medication changes (also fax the unit a copy of the discharge summary)         See DC summary from cardiology     Name of physician completing this form: Nuno De MD     Discussed with Dr Michelle Laurent    TTE 6/3/21    Interpretation Summary  Global and regional left ventricular function is normal with an EF of 55-60%.  Grade III or advanced diastolic dysfunction.  The RV function is borderline reduced, although there is limited visualization  of the RV free wall. The right ventricle is normal size.  No significant valvular abnormalities.  Both the left and right atrial filling pressures are increased (RA pressure  >15 mmHg).  A left pleural effusion is present. No pericardial effusion is present.  This study was compared with the study from 01/27/2020. The RV function has  declined modestly since the  last examination. The RA pressure is higher.  ______________________________________________________________________________  Left Ventricle  Global and regional left ventricular function is normal with an EF of 55-60%.  Moderate concentric wall thickening consistent with left ventricular  hypertrophy is present. Thickening of the anterobasal septum is present. Grade  III or advanced diastolic dysfunction. Diastolic Doppler findings (E/E' ratio  and/or other parameters) suggest left ventricular filling pressures are  increased. No regional wall motion abnormalities are seen.     Right Ventricle  The RV function is borderline reduced, although there is limited visualization  of the RV free wall. The right ventricle is normal size.     Atria  The right atria appears normal. Moderate to severe left atrial enlargement is  present.     Mitral Valve  The mitral valve is normal. Mild mitral insufficiency is present.     Aortic Valve  The aortic valve is tricuspid. Mild aortic valve calcification is present.     Tricuspid Valve  Mild to moderate tricuspid insufficiency is present. Right ventricular  systolic pressure is 54mmHg above the right atrial pressure.     Pulmonic Valve  The valve leaflets are not well visualized. Trace pulmonic insufficiency is  present. There is shortening of the PA acceleration time, consistent with  elevated PVR.     Vessels  Sinuses of Valsalva 3.8 cm. Ascending aorta 3.0 cm. IVC diameter >2.1 cm  collapsing <50% with sniff suggests a high RA pressure estimated at 15 mmHg or  greater.     Pericardium  No pericardial effusion is present.     Miscellaneous  A left pleural effusion is present.     Compared to Previous Study  This study was compared with the study from 01/27/2020 . The RV function has  declined modestly since the last examination. The RA pressure is higher.

## 2021-06-04 NOTE — PROGRESS NOTES
DISCHARGE                        6/4/2021  ----------------------------------------------------------------------------  Discharged to: [facility]  Via: [employer] stretcher with O2  Discharge Instructions: diet, activity, medications, follow up appointments, when to call the MD, aftercare instructions, and what to watchout for (i.e. s/s of infection, increasing SOB, palpitations, chest pain,)  Follow Up Appointments: arranged; information given  Belongings: All sent with pt  IV: out  Telemetry: off    Discharge Paperwork: Packet sent with patient to [facility]    Report: Given to NICOLE Parker at LT facility

## 2021-06-04 NOTE — PROGRESS NOTES
"HEMODIALYSIS TREATMENT NOTE    Date: 6/4/2021  Time: 5:49 PM    Data:  Pre Wt: 85.3 kg (188 lb 0.8 oz)   Desired Wt: 83.3 kg   Post Wt: 84.5 kg (186 lb 4.6 oz)  Weight change: 0.8 kg  Ultrafiltration - Post Run Net Total Removed (mL): 800 mL  Vascular Access Status: Fistula  patent  Dialyzer Rinse: Streaked  Total Blood Volume Processed: 68.4 L   Total Dialysis (Treatment) Time: 3.5 hr   Dialysate Bath: K 3, Ca 2.25  Heparin: None    Lab:   Yes, updated Hep B labs    Interventions:  Pt arrived for HD, on 3 L O2, to pull 2 kg as james keeping SBP >90.  Attempted 2 kg off, BP dropped to 86/44 and pt at -4.4% blood vol chg.  UF off and 2 boluses of 100 ml NS given to bring BP back up. Goal lowered to 1.1 overall, then BP slowly improved allowing total 1.3 kg off, net 0.8 kg. Pt post tx /76, noting -6% blood vol chg per crit line.  Noting still has facial and periorbital edema. Bradycardic throughout the run in 50's. Pt is intermittently disoriented. She did pull out 1 of 2 peripheral IV's, stating \" I don't need 2\"      Assessment:  Pt is still vol up, could benefit from additional UF only.      Plan:    Next tx per renal team      "

## 2021-06-04 NOTE — DISCHARGE SUMMARY
[address] [address]  p: [phone]    Discharge Summary: Cardiology Service    Kim Anne MRN# [id number]   YOB: 1945 Age: 75 year old       Admission Date: 06/2/2021  Discharge Date: 06/04/2021      Discharge Diagnoses:  1. NSTEMI type 1 vs. Type 2 MI  2. History of non-obstructive CAD  3. Acute on chronic macrocytic anemia - resolved   4. ESRD on HD MWF  5. COPD  6. Hypothyroidism   7. Dementia    Pertinent Procedures:  1. NA    Consults:  Nephrology    Imaging with results:  Echocardiogram 6/3/2021:  Interpretation Summary  Global and regional left ventricular function is normal with an EF of 55-60%.  Grade III or advanced diastolic dysfunction.  The RV function is borderline reduced, although there is limited visualization  of the RV free wall. The right ventricle is normal size.  No significant valvular abnormalities.  Both the left and right atrial filling pressures are increased (RA pressure  >15 mmHg).  A left pleural effusion is present. No pericardial effusion is present.  This study was compared with the study from 01/27/2020. The RV function has  declined modestly since the last examination. The RA pressure is higher.  ______________________________________________________________________________  Left Ventricle  Global and regional left ventricular function is normal with an EF of 55-60%.  Moderate concentric wall thickening consistent with left ventricular  hypertrophy is present. Thickening of the anterobasal septum is present. Grade  III or advanced diastolic dysfunction. Diastolic Doppler findings (E/E' ratio  and/or other parameters) suggest left ventricular filling pressures are  increased. No regional wall motion abnormalities are seen.     Right Ventricle  The RV function is borderline reduced, although there is limited visualization  of the RV free wall. The right ventricle is normal size.      Atria  The right atria appears normal. Moderate to severe left atrial enlargement is  present.     Mitral Valve  The mitral valve is normal. Mild mitral insufficiency is present.     Aortic Valve  The aortic valve is tricuspid. Mild aortic valve calcification is present.     Tricuspid Valve  Mild to moderate tricuspid insufficiency is present. Right ventricular  systolic pressure is 54mmHg above the right atrial pressure.     Pulmonic Valve  The valve leaflets are not well visualized. Trace pulmonic insufficiency is  present. There is shortening of the PA acceleration time, consistent with  elevated PVR.     Vessels  Sinuses of Valsalva 3.8 cm. Ascending aorta 3.0 cm. IVC diameter >2.1 cm  collapsing <50% with sniff suggests a high RA pressure estimated at 15 mmHg or  greater.     Pericardium  No pericardial effusion is present.     Miscellaneous  A left pleural effusion is present.     Compared to Previous Study  This study was compared with the study from 01/27/2020 . The RV function has  declined modestly since the last examination. The RA pressure is higher.    Brief HPI:  Kim Anne is a 75 year old female with a history of ESRD on HD (M/W/F) w/ hypoparathyroidism, hypocalcemia, HTN, hypothyroidism, dementia, COPD, hx of tuberculosis (s/p treatment in 1970s) and mild CAD on coronary angiogram from 5/20/2019. She presented to the ED on 6/2/21 after endorsing sharp, chest pain while at dialysis. Initial troponin in the ED was mildly elevated at 0.085. ECG revealed nonspecific ST and T wave changes in inferior and lateral leads. Admitted to cardiology for observation.     Hospital Course by Diagnosis:    # Sharp, atypical chest pain, r/o ACS  # Elevated troponin   # History of mild CAD   Patient presented with rather atypical chest pain that was nonexertional, sharp in nature and self-limiting. This occurred near the end of dialysis so it could have been attributed from fluid shift or hemodynamic  changes at that time. Her troponin was elevated on presentation at 0.085 and immediately downtrended. Does have history of non-obstructive CAD per angiogram 2019. The nature of her pain is not classic for angina. Her TTE demonstrated normal LVEF with grade 3 diastolic dysfunction, and a dilated IVC indicating some fluid overload. Given multiple risk factors, and inability to reach decision maker, opted to treat patient medically for potential NSTEMI with heparin. She will be started on plavix, and will recommend she follow up with cardiology. Stress testing was of unclear benefit consider patient would have been unable to consent for an angiogram. We were eventually able to reach her designated medical decision maker on HOD 2, and she agrees with the above plan. At this time, it is unclear if she experienced progression of her known CAD to with a true NSTEMI, or if this represents demand ischemia in setting of dialysis. Could consider further ischemic evaluation as an outpatient.  - continue aspirin 81 mg + Plavix 75mg daily  - atorvastatin 40 mg daily  - beta blocker deferred due to sinus bradycardia ~55-60     #Acute on chronic macrocytic anemia  Presenting hgb 7.5, dropped to 6.6 on HOD 1. Abdominal exam benign. Bili WNL, iron studies with low iron, high ferritin, mildly decreased transferrin. B12 and folate WNL. Absolute retic count was low. No clear signs of bleeding on exam. Attempted to contact guardian x4, however, rings to voicemail that is not set up. Given we were treating ACS as above, patient was transfused 1U of PRBCs emergently. Serial hemoglobins remained stable ~7.8. Will recommend a repeat CBC with next dialysis run on Monday. No bleed on rectal exam. FOBT not able to be collected.   - pantoprazole 40 bid  - CBC with next dialysis run  - follow up with PCP     # ESRD on HD M/W/F  - Nephro consult for HD     # COPD  - continue PTA inhalers and Spiriva     # Hypothyroidism  Continue PTA  levothyroxine     # Dementia  Alternative decision maker is sister, Vandana. Unable to reach her (phone rings, but no voicemail to leave message). Per , outpatient dialysis reporting similar issue. Evidently had court appointed guardian during prior [facility] admission, which was since switched to Vandana. Her children do not appear to be involved in her care per prior notation. Eventually we were able to reach Vandana, who voiced understanding of the above plan and intended follow up appointments. She had no questions.      Condition on discharge  Temp:  [97.4  F (36.3  C)-98.1  F (36.7  C)] 97.6  F (36.4  C)  Pulse:  [] 59  Resp:  [16-24] 20  BP: ()/(55-89) 103/55  SpO2:  [89 %-100 %] 96 %  General: Alert, interactive, NAD  Eyes: sclera anicteric, EOMI  Neck: JVP difficult to assess due to body habitus, carotid 2+ bilaterally  Cardiovascular: regular rate and rhythm, normal S1 and S2, no murmurs, gallops, or rubs  Resp: clear to auscultation bilaterally, no rales, wheezes, or rhonchi  GI: Soft, nontender, nondistended. +BS.  No HSM or masses, no rebound or guarding.  Extremities: trace edema, no cyanosis or clubbing, dorsalis pedis and posterior tibialis pulses 2+ bilaterally  Skin: Warm and dry, no jaundice or rash  Neuro: CN 2-12 intact, moves all extremities equally  Psych: Alert, disoriented to situation, place      Medication Changes:    Discharge medications:   Current Discharge Medication List      START taking these medications    Details   clopidogrel (PLAVIX) 75 MG tablet Take 1 tablet (75 mg) by mouth daily  Qty:      Associated Diagnoses: NSTEMI (non-ST elevated myocardial infarction) (H)      pantoprazole (PROTONIX) 40 MG EC tablet Take 1 tablet (40 mg) by mouth 2 times daily  Qty: 60 tablet, Refills: 3    Associated Diagnoses: Encounter regarding vascular access for dialysis for ESRD (H)         CONTINUE these medications which have NOT CHANGED    Details   albuterol (PROAIR HFA/PROVENTIL  HFA/VENTOLIN HFA) 108 (90 Base) MCG/ACT inhaler Inhale 2 puffs into the lungs every 6 hours as needed for shortness of breath / dyspnea or wheezing  Qty: 18 g, Refills: 3    Comments: Pharmacy may dispense brand covered by insurance (Proair, or proventil or ventolin or generic albuterol inhaler)  Associated Diagnoses: Chronic obstructive pulmonary disease, unspecified COPD type (H)      ammonium lactate (LAC-HYDRIN) 12 % external lotion Apply topically 2 times daily  Qty: 225 g, Refills: 0    Associated Diagnoses: Dry skin      ASPIR-LOW 81 MG EC tablet Take 1 tablet (81 mg) by mouth At Bedtime  Qty: 90 tablet, Refills: 0    Associated Diagnoses: History of MI (myocardial infarction); Essential hypertension, benign      atorvastatin (LIPITOR) 40 MG tablet Take 1 tablet (40 mg) by mouth At Bedtime  Qty: 90 tablet, Refills: 0    Associated Diagnoses: Hyperlipidemia LDL goal <100      blood glucose monitoring (FREESTYLE LITE) test strip TEST BLOOD SUGAR TWO TIMES A DAY  Qty: 50 strip, Refills: 12    Associated Diagnoses: Type 2 diabetes mellitus without complication, with long-term current use of insulin (H)      blood glucose monitoring (FREESTYLE) lancets Test BS two times daily as directed  Qty: 100 each, Refills: 1    Associated Diagnoses: Type 2 diabetes mellitus without complication, with long-term current use of insulin (H)      Emollient (EUCERIN CALMING DAILY MOIST) CREA Externally apply 1 dose. topically daily  Qty: 1 Tube, Refills: 12    Associated Diagnoses: Type II or unspecified type diabetes mellitus with neurological manifestations, not stated as uncontrolled(250.60) (H)      fluticasone-salmeterol (ADVAIR) 250-50 MCG/DOSE inhaler Inhale 1 puff into the lungs every 12 hours  Qty: 1 Inhaler, Refills: 5    Associated Diagnoses: COPD exacerbation (H)      levothyroxine (SYNTHROID/LEVOTHROID) 175 MCG tablet Take 1 tablet (175 mcg) by mouth every morning (before breakfast)  Qty: 90 tablet, Refills: 0     Associated Diagnoses: Hypothyroidism, unspecified type      midodrine (PROAMATINE) 10 MG tablet Take 10 mg (one tab) before receiving hemodialysis  Qty: 30 tablet, Refills: 0    Associated Diagnoses: Hemodialysis-associated hypotension      multivitamin RENAL (NEPHROCAPS/TRIPHROCAPS) 1 MG capsule Take 1 capsule by mouth daily  Qty: 90 capsule, Refills: 0    Associated Diagnoses: CKD (chronic kidney disease) stage 4, GFR 15-29 ml/min (H)      nitroGLYcerin (NITROSTAT) 0.4 MG sublingual tablet Place 1 tablet (0.4 mg) under the tongue every 5 minutes as needed for chest pain  Qty: 25 tablet, Refills: 0    Associated Diagnoses: History of MI (myocardial infarction)      !! order for DME Equipment being ordered: Nebulizer  Qty: 1 Device, Refills: 0    Associated Diagnoses: Chronic obstructive pulmonary disease, unspecified COPD type (H)      !! order for DME Equipment being ordered: Boost.  Qty: 6 Can, Refills: 3    Associated Diagnoses: CKD (chronic kidney disease) stage 5, GFR less than 15 ml/min (H)      !! order for DME Equipment being ordered: cane  Qty: 1 each, Refills: 0    Associated Diagnoses: Non-compliant patient      !! order for DME Equipment being ordered: Diabetic Shoes  Qty: 1 each, Refills: 0    Associated Diagnoses: Type 2 diabetes mellitus with stage 5 chronic kidney disease not on chronic dialysis, without long-term current use of insulin (H)      !! order for DME Equipment being ordered: two pairs moderate knee high support hose  Qty: 2 Device, Refills: 1    Associated Diagnoses: Edema, unspecified type      !! order for DME Equipment being ordered: Compression socks.  Strength:15-20 mmHg  Qty: 2 each, Refills: 0    Associated Diagnoses: Localized edema      !! order for DME One wheeled walker with seat and brakes and basket  Qty: 1 Device, Refills: 0    Associated Diagnoses: Risk for falls      polyethylene glycol (MIRALAX) packet Take 17 g by mouth daily as needed for constipation  Qty: 10 packet,  Refills: 0    Associated Diagnoses: Slow transit constipation      tiotropium (SPIRIVA) 18 MCG inhaled capsule Inhale 1 capsule (18 mcg) into the lungs daily  Qty: 30 capsule, Refills: 11    Associated Diagnoses: Chronic bronchitis, unspecified chronic bronchitis type (H); Pulmonary emphysema, unspecified emphysema type (H)      vitamin D3 (CHOLECALCIFEROL) 2000 units (50 mcg) tablet Take 1 tablet (2,000 Units) by mouth daily  Qty: 90 tablet, Refills: 3    Associated Diagnoses: Vitamin D deficiency disease       !! - Potential duplicate medications found. Please discuss with provider.      STOP taking these medications       oxyCODONE (ROXICODONE) 5 MG tablet Comments:   Reason for Stopping:               Labs or imaging requiring follow-up after discharge:  CBC 6/7/2021      Follow-up:  Dialysis per routine schedule  PCP within 7 days of discharge  Cardiology clinic in about 2-3 weeks    Code status:  FULL      45 minutes spent in discharge, including >50% in counseling and coordination of care, medication review and plan of care recommended on follow up. Questions were answered.   It was our pleasure to care for Kim Anne during this hospitalization. Please do not hesitate to contact me should there be questions regarding the hospital course or discharge plan.        Patient seen and discussed with Dr. Mya Mccain PA-C  [facility] Cardiology Team    I very much appreciated the opportunity to see and assess Kim Anne in the hospital with Efrain Mccain PA-C.  Mrs. Anne presented with multiple medical issues including.NSTEMI type 1 vs. Type 2 MI, History of non-obstructive CAD, Acute on chronic macrocytic anemia - resolved , ESRD on HD MWF, COPD, Hypothyroidism   And Dementia.  The latter made it difficult to direct evaluation until we were able to get hold of her legal representative.  Patient symptoms of subsequently subsided.    I agree with he note above which summarizes my  findings and current recommendations.  Please do not hesitate to contact my office if you have any questions or concerns.      Serge Roque MD  Cardiac Arrhythmia Service  [facility]  [phone]

## 2021-06-04 NOTE — PROGRESS NOTES
Oxygen Documentation:   I certify that this patient, Kim Anne has been under my care (or a nurse practitioner or physican's assistant working with me). This is the face-to-face encounter for oxygen medical necessity.      Kim Anne is now in a chronic stable state and continues to require supplemental oxygen. Patient has continued oxygen desaturation due to HUMBERTO.    Alternative treatment(s) tried or considered and deemed clinically infective for treatment of HUMBERTO include pulmonary toileting.  If portability is ordered, is the patient mobile within the home? yes      Efrain Mccain PA-C  [facility] Cardiology Team

## 2021-06-04 NOTE — PROGRESS NOTES
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 Nephrology consulted for assistance with HD.      ESRD 2/2 diabetic nephropathy on HD MWF  Solitary right kidney s/p donation (1988)  The patient dialyzes MWF at [facility] in [place]. CELSA MENDEZ. Primary nephrologist is Dr. Barriga. She is not active on the transplant list. The patient completed all but 15-min of dialysis run on 6/2 per dialysis RN.   - Will have HD run today      Acute on chronic AoCD  The patient presented with Hb of 7.5 (around baseline) which decreased to 6.4. Recheck showed Hb of 6.4 --> 6.6. Concerning for possible bleed after being started on Heparin gtt, but she has no symptoms of bleeding and QUINN was negative for blood. Folate and B12 WNL. Iron panel with ferritin 358 and iron saturation 7% (decreased from previous despite Venofer infusions as outpatient).   - 100mg Venofer (7 of 10) and Epogen 10,000u IV during dialysis 6/4   - Consider outpatient GI referral for evaluation of chronic GI blood loss      Recommendations were communicated to primary team.      Seen and discussed with Dr. Mehran Beckman DO  PGY-3, Internal Medicine    Interval History :   Nursing and provider notes from last 24 hours reviewed. No acute events overnight. The patient has been resting comfortably on and off.     Review of Systems:   Denies fever, chills, chest pain, SOB, cough, abdominal pain, LE swelling.     Physical Exam:   I/O last 3 completed shifts:  In: 1020 [P.O.:720]  Out: 50 [Urine:50]   /55   Pulse 54   Temp 98  F (36.7  C) (Oral)   Resp 16   Wt 85.3 kg (188 lb 1.6 oz)   SpO2 100%   BMI 31.30 kg/m       GENERAL APPEARANCE: Lying in bed in  NAD  HEENT: NCAT, EOMI  Pulmonary: Decreased breath sounds in left lung base, breathing comfortably on 3L O2  CV: RRR, no murmur, no peripheral edema in bilateral LEs   GI: Soft, nontender, nondistended, +BS  SKIN: Warm, dry, no rash   NEURO: Sleepy but arousable, NFD   Access: LUE fistula    Labs:   All labs reviewed by me  Electrolytes/Renal -   Recent Labs   Lab Test 06/04/21 0622 06/03/21 0349 06/02/21  1308 02/29/20  0441 02/26/20  0454 02/26/20  0454 01/29/20  0003 01/29/20  0003 01/27/20  0420 01/27/20  0420 01/26/20  1612 01/26/20  1612    133 134 136   < > 140   < >  --    < > 137   < > 135   POTASSIUM 4.0 4.0 3.5 4.5   < > 4.6  4.6   < > 4.2   < > 4.3   < > 5.3   CHLORIDE 98 100 98 105   < > 106   < >  --    < > 106   < > 106   CO2 27 27 32 24   < > 25   < >  --    < > 27   < > 25   BUN 51* 38* 28 32*   < > 30   < >  --    < > 15   < > 24   CR 4.31* 3.33* 2.63* 4.94*   < > 3.21*   < >  --    < > 1.69*   < > 2.73*   * 85 163* 107*   < > 204*   < >  --    < > 134*   < > 197*   FRANCES 8.6 8.7 8.3* 8.2*   < > 8.8   < >  --    < > 9.3   < > 8.9   MAG  --   --   --   --   --   --   --  1.6  --  1.9  --  2.0   PHOS  --   --   --  4.6*  --  3.3  --  2.5  --  2.8  --  3.2    < > = values in this interval not displayed.       CBC -   Recent Labs   Lab Test 06/04/21 0622 06/03/21 2059 06/03/21  1709 06/03/21 0349 06/03/21 0349 06/02/21  1308   WBC 6.4  --   --   --  5.0 7.7   HGB 7.8* 7.9* 7.8*   < > 6.4* 7.5*     --   --   --  199 194    < > = values in this interval not displayed.       LFTs -   Recent Labs   Lab Test 06/03/21  0533 06/02/21  1308 02/26/20  0454   ALKPHOS 228* 258* 213*   BILITOTAL 0.4 0.3 0.3   ALT 20 20 54*   AST 17 31 37   PROTTOTAL 6.2* 6.7* 6.6*   ALBUMIN 2.6* 2.9* 2.9*       Iron Panel -   Recent Labs   Lab Test 06/03/21  0533 06/03/21  0349 02/25/20  0955 05/01/19  1320   IRON  --  27* 47 48   IRONSAT  --  7* 20 34   *  --  1,088* 286*         Imaging:  All  imaging studies reviewed by me.     Current Medications:    aspirin  81 mg Oral At Bedtime     atorvastatin  40 mg Oral At Bedtime     [START ON 6/5/2021] clopidogrel  75 mg Oral Daily     fluticasone-vilanterol  1 puff Inhalation Daily     gelatin absorbable  1 each Topical During Hemodialysis (from stock)     levothyroxine  175 mcg Oral QAM AC     midodrine  20 mg Oral Once per day on Mon Wed Fri     pantoprazole  40 mg Oral BID     sodium chloride (PF)  3 mL Intracatheter Q8H     umeclidinium  1 puff Inhalation Daily       - MEDICATION INSTRUCTIONS -       - MEDICATION INSTRUCTIONS -       - MEDICATION INSTRUCTIONS -

## 2021-06-04 NOTE — PROGRESS NOTES
Care Management Discharge Note    Discharge Date: 06/04/21  Expected Time of Departure: 6pm via stretcher with O2    Discharge Disposition: Skilled Nursing Facilty  [facility]  [address] [place] [address]  Main Ph [phone]  Liaison Ph [phone]  Fax [phone]    Discharge Services: Outpatient HD  [facility] MWF  Ph [phone]  Fax [phone]  Updated HD center that pt will return for run on Monday.     Discharge DME: None    Discharge Transportation:  [employer] (ph [phone]) stretcher with O2. PCS completed and placed in pt's chart for O2, dementia w/ supervision.      Private pay costs discussed: Not applicable    PAS Confirmation Code: n/a   Patient/family educated on Medicare website which has current facility and service quality ratings: (n/a)    Education Provided on the Discharge Plan: yes- pt's guardian answered phone today and is in agreement with plan  Persons Notified of Discharge Plans: Antonia Peck- facility liaison, primary team Cards 1, pt's sister/guardian Vandana (ph [phone])  Patient/Family in Agreement with the Plan: yes    Handoff Referral Completed: No    Additional Information:  Pt discharging today at 6pm back to LTC via stretcher.    Staff instructions:  RN please call report to [phone].  Please print a packet and send with pt.  SW to fax discharge orders and signed scripts for controlled substances to LTC.  SW to fax discharge information to OP dialysis center.    ANA Valdez, [facility]  6C   [facility]  Pager [phone]  Phone [phone]

## 2021-06-08 ENCOUNTER — DOCUMENTATION ONLY (OUTPATIENT)
Dept: OTHER | Facility: CLINIC | Age: 76
End: 2021-06-08

## 2021-06-23 ENCOUNTER — TRANSFERRED RECORDS (OUTPATIENT)
Dept: HEALTH INFORMATION MANAGEMENT | Facility: CLINIC | Age: 76
End: 2021-06-23

## 2021-06-25 ENCOUNTER — HOSPITAL ENCOUNTER (EMERGENCY)
Facility: CLINIC | Age: 76
Discharge: HOME | End: 2021-06-25
Attending: EMERGENCY MEDICINE | Admitting: EMERGENCY MEDICINE
Payer: COMMERCIAL

## 2021-06-25 ENCOUNTER — TRANSFERRED RECORDS (OUTPATIENT)
Dept: HEALTH INFORMATION MANAGEMENT | Facility: CLINIC | Age: 76
End: 2021-06-25

## 2021-06-25 VITALS
DIASTOLIC BLOOD PRESSURE: 45 MMHG | OXYGEN SATURATION: 100 % | RESPIRATION RATE: 16 BRPM | TEMPERATURE: 98.1 F | HEART RATE: 66 BPM | SYSTOLIC BLOOD PRESSURE: 104 MMHG

## 2021-06-25 DIAGNOSIS — Z51.89 ENCOUNTER FOR BLOOD TRANSFUSION: ICD-10-CM

## 2021-06-25 DIAGNOSIS — D64.9 ANEMIA, UNSPECIFIED TYPE: ICD-10-CM

## 2021-06-25 LAB
ABO + RH BLD: NORMAL
ABO + RH BLD: NORMAL
ALBUMIN SERPL-MCNC: 2.7 G/DL (ref 3.4–5)
ALP SERPL-CCNC: 275 U/L (ref 40–150)
ALT SERPL W P-5'-P-CCNC: 36 U/L (ref 0–50)
ANION GAP SERPL CALCULATED.3IONS-SCNC: 6 MMOL/L (ref 3–14)
AST SERPL W P-5'-P-CCNC: 41 U/L (ref 0–45)
BASOPHILS # BLD AUTO: 0.1 10E9/L (ref 0–0.2)
BASOPHILS NFR BLD AUTO: 1 %
BILIRUB SERPL-MCNC: 0.4 MG/DL (ref 0.2–1.3)
BLD GP AB SCN SERPL QL: NORMAL
BLD PROD TYP BPU: NORMAL
BLD PROD TYP BPU: NORMAL
BLD UNIT ID BPU: 0
BLOOD BANK CMNT PATIENT-IMP: NORMAL
BLOOD PRODUCT CODE: NORMAL
BPU ID: NORMAL
BUN SERPL-MCNC: 17 MG/DL (ref 7–30)
CALCIUM SERPL-MCNC: 8 MG/DL (ref 8.5–10.1)
CHLORIDE SERPL-SCNC: 96 MMOL/L (ref 94–109)
CO2 SERPL-SCNC: 31 MMOL/L (ref 20–32)
CREAT SERPL-MCNC: 2.13 MG/DL (ref 0.52–1.04)
DIFFERENTIAL METHOD BLD: ABNORMAL
EOSINOPHIL # BLD AUTO: 0.2 10E9/L (ref 0–0.7)
EOSINOPHIL NFR BLD AUTO: 2.4 %
ERYTHROCYTE [DISTWIDTH] IN BLOOD BY AUTOMATED COUNT: 21.7 % (ref 10–15)
GFR SERPL CREATININE-BSD FRML MDRD: 22 ML/MIN/{1.73_M2}
GLUCOSE SERPL-MCNC: 158 MG/DL (ref 70–99)
HCT VFR BLD AUTO: 21.9 % (ref 35–47)
HGB BLD-MCNC: 6.4 G/DL (ref 11.7–15.7)
IMM GRANULOCYTES # BLD: 0.1 10E9/L (ref 0–0.4)
IMM GRANULOCYTES NFR BLD: 1.3 %
LYMPHOCYTES # BLD AUTO: 0.4 10E9/L (ref 0.8–5.3)
LYMPHOCYTES NFR BLD AUTO: 6.5 %
MCH RBC QN AUTO: 30.8 PG (ref 26.5–33)
MCHC RBC AUTO-ENTMCNC: 29.2 G/DL (ref 31.5–36.5)
MCV RBC AUTO: 105 FL (ref 78–100)
MONOCYTES # BLD AUTO: 0.5 10E9/L (ref 0–1.3)
MONOCYTES NFR BLD AUTO: 7.1 %
NEUTROPHILS # BLD AUTO: 5.2 10E9/L (ref 1.6–8.3)
NEUTROPHILS NFR BLD AUTO: 81.7 %
NRBC # BLD AUTO: 0 10*3/UL
NRBC BLD AUTO-RTO: 0 /100
NUM BPU REQUESTED: 1
PLATELET # BLD AUTO: 355 10E9/L (ref 150–450)
POTASSIUM SERPL-SCNC: 3.5 MMOL/L (ref 3.4–5.3)
PROT SERPL-MCNC: 6.8 G/DL (ref 6.8–8.8)
RBC # BLD AUTO: 2.08 10E12/L (ref 3.8–5.2)
SODIUM SERPL-SCNC: 133 MMOL/L (ref 133–144)
SPECIMEN EXP DATE BLD: NORMAL
TRANSFUSION STATUS PATIENT QL: NORMAL
TRANSFUSION STATUS PATIENT QL: NORMAL
WBC # BLD AUTO: 6.3 10E9/L (ref 4–11)

## 2021-06-25 PROCEDURE — 96360 HYDRATION IV INFUSION INIT: CPT | Performed by: EMERGENCY MEDICINE

## 2021-06-25 PROCEDURE — 86900 BLOOD TYPING SEROLOGIC ABO: CPT | Performed by: EMERGENCY MEDICINE

## 2021-06-25 PROCEDURE — 99285 EMERGENCY DEPT VISIT HI MDM: CPT | Mod: 25 | Performed by: EMERGENCY MEDICINE

## 2021-06-25 PROCEDURE — 85025 COMPLETE CBC W/AUTO DIFF WBC: CPT | Performed by: EMERGENCY MEDICINE

## 2021-06-25 PROCEDURE — 86850 RBC ANTIBODY SCREEN: CPT | Performed by: EMERGENCY MEDICINE

## 2021-06-25 PROCEDURE — 80053 COMPREHEN METABOLIC PANEL: CPT | Performed by: EMERGENCY MEDICINE

## 2021-06-25 PROCEDURE — 86923 COMPATIBILITY TEST ELECTRIC: CPT | Performed by: EMERGENCY MEDICINE

## 2021-06-25 PROCEDURE — 250N000013 HC RX MED GY IP 250 OP 250 PS 637: Performed by: EMERGENCY MEDICINE

## 2021-06-25 PROCEDURE — 36430 TRANSFUSION BLD/BLD COMPNT: CPT | Performed by: EMERGENCY MEDICINE

## 2021-06-25 PROCEDURE — 258N000003 HC RX IP 258 OP 636: Performed by: EMERGENCY MEDICINE

## 2021-06-25 PROCEDURE — 86901 BLOOD TYPING SEROLOGIC RH(D): CPT | Performed by: EMERGENCY MEDICINE

## 2021-06-25 PROCEDURE — P9016 RBC LEUKOCYTES REDUCED: HCPCS | Performed by: EMERGENCY MEDICINE

## 2021-06-25 PROCEDURE — 99285 EMERGENCY DEPT VISIT HI MDM: CPT | Performed by: EMERGENCY MEDICINE

## 2021-06-25 RX ORDER — ACETAMINOPHEN 500 MG
1000 TABLET ORAL ONCE
Status: COMPLETED | OUTPATIENT
Start: 2021-06-25 | End: 2021-06-25

## 2021-06-25 RX ADMIN — ACETAMINOPHEN 1000 MG: 500 TABLET, FILM COATED ORAL at 19:19

## 2021-06-25 RX ADMIN — SODIUM CHLORIDE 15 ML: 9 INJECTION, SOLUTION INTRAVENOUS at 19:20

## 2021-06-25 ASSESSMENT — ENCOUNTER SYMPTOMS
SHORTNESS OF BREATH: 0
FATIGUE: 1

## 2021-06-25 NOTE — Clinical Note
Pt eloped from ED without notifying staff. Attempted to reach patient via telephone to notifying patient of lab results without success.
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
## 2021-06-25 NOTE — ED NOTES
Telephone consent obtained from patient's legal guardian, Vandana Allen.   Consent witnessed by Martha Jessica RN & Baron Harrell RN.

## 2021-06-25 NOTE — ED TRIAGE NOTES
Patient sent from dialysis for low hemoglobin. Per ems it was 6.6 Patient has been more fatigue than normal. Patient did finish dialysis.

## 2021-09-15 NOTE — TELEPHONE ENCOUNTER
Medical resident on patient's team at [facility] called and spoke with PCP.  Given Franklyn's contact #. Angelina Hollis RN March 27, 2020 9:36 AM     01-Sep-2021 01-Sep-2021 26-Aug-2021 01-Sep-2021 26-Aug-2021 01-Sep-2021 15-Sep-2021 01-Sep-2021 01-Sep-2021 26-Aug-2021 01-Sep-2021 01-Sep-2021 26-Aug-2021 26-Aug-2021 01-Sep-2021
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
## 2021-10-20 NOTE — ED TRIAGE NOTES
Pt brought in by ambulance with reports of coming from dialysis, just started on run then became short of breath. 's. Previousl thought it was anxiety. NSR on 12 lead. . 10 mg droperidol. 1 dose of nitro. Lives at [facility] in [place].

## 2021-10-20 NOTE — DISCHARGE INSTRUCTIONS
Please make an appointment to follow up with Your Primary Care Provider as needed.    Cardiology clinic will call you to set up a follow-up appointment to discuss Zio patch monitor results.    Continue dialyzing as per schedule and continue current medications as prescribed.    Return to the emergency department for any problems.

## 2021-10-20 NOTE — ED NOTES
Bed: ED12  Expected date: 10/20/21  Expected time: 8:21 AM  Means of arrival: Ambulance  Comments:  [facility] with a 75 yo F with reports of shortness of breath and anxiety. Triaged yellow in 5 mins

## 2022-01-01 ENCOUNTER — APPOINTMENT (OUTPATIENT)
Dept: CT IMAGING | Facility: CLINIC | Age: 77
DRG: 871 | End: 2022-01-01
Payer: COMMERCIAL

## 2022-01-01 ENCOUNTER — PATIENT OUTREACH (OUTPATIENT)
Dept: CARE COORDINATION | Facility: CLINIC | Age: 77
End: 2022-01-01

## 2022-01-01 ENCOUNTER — APPOINTMENT (OUTPATIENT)
Dept: CT IMAGING | Facility: CLINIC | Age: 77
DRG: 871 | End: 2022-01-01
Attending: EMERGENCY MEDICINE
Payer: COMMERCIAL

## 2022-01-01 ENCOUNTER — APPOINTMENT (OUTPATIENT)
Dept: GENERAL RADIOLOGY | Facility: CLINIC | Age: 77
DRG: 871 | End: 2022-01-01
Attending: STUDENT IN AN ORGANIZED HEALTH CARE EDUCATION/TRAINING PROGRAM
Payer: COMMERCIAL

## 2022-01-01 ENCOUNTER — APPOINTMENT (OUTPATIENT)
Dept: CARDIOLOGY | Facility: CLINIC | Age: 77
DRG: 871 | End: 2022-01-01
Attending: STUDENT IN AN ORGANIZED HEALTH CARE EDUCATION/TRAINING PROGRAM
Payer: COMMERCIAL

## 2022-01-01 ENCOUNTER — APPOINTMENT (OUTPATIENT)
Dept: PHYSICAL THERAPY | Facility: CLINIC | Age: 77
DRG: 871 | End: 2022-01-01
Payer: COMMERCIAL

## 2022-01-01 ENCOUNTER — TELEPHONE (OUTPATIENT)
Dept: NURSING | Facility: CLINIC | Age: 77
End: 2022-01-01
Payer: COMMERCIAL

## 2022-01-01 ENCOUNTER — APPOINTMENT (OUTPATIENT)
Dept: CT IMAGING | Facility: CLINIC | Age: 77
End: 2022-01-01
Attending: PHYSICIAN ASSISTANT
Payer: COMMERCIAL

## 2022-01-01 ENCOUNTER — APPOINTMENT (OUTPATIENT)
Dept: GENERAL RADIOLOGY | Facility: CLINIC | Age: 77
End: 2022-01-01
Attending: PHYSICIAN ASSISTANT
Payer: COMMERCIAL

## 2022-01-01 ENCOUNTER — TRANSFERRED RECORDS (OUTPATIENT)
Dept: MEDSURG UNIT | Facility: CLINIC | Age: 77
End: 2022-01-01

## 2022-01-01 ENCOUNTER — LAB REQUISITION (OUTPATIENT)
Dept: LAB | Facility: CLINIC | Age: 77
End: 2022-01-01
Payer: COMMERCIAL

## 2022-01-01 ENCOUNTER — APPOINTMENT (OUTPATIENT)
Dept: GENERAL RADIOLOGY | Facility: CLINIC | Age: 77
DRG: 871 | End: 2022-01-01
Attending: EMERGENCY MEDICINE
Payer: COMMERCIAL

## 2022-01-01 ENCOUNTER — HOSPITAL ENCOUNTER (INPATIENT)
Facility: CLINIC | Age: 77
LOS: 8 days | Discharge: NURSING, REHAB OR LONG-TERM CARE | DRG: 871 | End: 2022-07-05
Attending: EMERGENCY MEDICINE | Admitting: STUDENT IN AN ORGANIZED HEALTH CARE EDUCATION/TRAINING PROGRAM
Payer: COMMERCIAL

## 2022-01-01 ENCOUNTER — HOSPITAL ENCOUNTER (OUTPATIENT)
Facility: CLINIC | Age: 77
Setting detail: OBSERVATION
Discharge: HOME | End: 2022-08-11
Attending: STUDENT IN AN ORGANIZED HEALTH CARE EDUCATION/TRAINING PROGRAM | Admitting: HOSPITALIST
Payer: COMMERCIAL

## 2022-01-01 ENCOUNTER — APPOINTMENT (OUTPATIENT)
Dept: SPEECH THERAPY | Facility: CLINIC | Age: 77
End: 2022-01-01
Payer: COMMERCIAL

## 2022-01-01 ENCOUNTER — NURSING HOME VISIT (OUTPATIENT)
Dept: GERIATRICS | Facility: CLINIC | Age: 77
End: 2022-01-01
Payer: COMMERCIAL

## 2022-01-01 ENCOUNTER — APPOINTMENT (OUTPATIENT)
Dept: SPEECH THERAPY | Facility: CLINIC | Age: 77
End: 2022-01-01
Attending: PHYSICIAN ASSISTANT
Payer: COMMERCIAL

## 2022-01-01 ENCOUNTER — APPOINTMENT (OUTPATIENT)
Dept: PHYSICAL THERAPY | Facility: CLINIC | Age: 77
DRG: 871 | End: 2022-01-01
Attending: STUDENT IN AN ORGANIZED HEALTH CARE EDUCATION/TRAINING PROGRAM
Payer: COMMERCIAL

## 2022-01-01 ENCOUNTER — TRANSFERRED RECORDS (OUTPATIENT)
Dept: HEALTH INFORMATION MANAGEMENT | Facility: CLINIC | Age: 77
End: 2022-01-01

## 2022-01-01 ENCOUNTER — TELEPHONE (OUTPATIENT)
Dept: GERIATRICS | Facility: CLINIC | Age: 77
End: 2022-01-01

## 2022-01-01 ENCOUNTER — DOCUMENTATION ONLY (OUTPATIENT)
Dept: GERIATRICS | Facility: CLINIC | Age: 77
End: 2022-01-01

## 2022-01-01 ENCOUNTER — APPOINTMENT (OUTPATIENT)
Dept: GENERAL RADIOLOGY | Facility: CLINIC | Age: 77
End: 2022-01-01
Attending: STUDENT IN AN ORGANIZED HEALTH CARE EDUCATION/TRAINING PROGRAM
Payer: COMMERCIAL

## 2022-01-01 ENCOUNTER — APPOINTMENT (OUTPATIENT)
Dept: ULTRASOUND IMAGING | Facility: CLINIC | Age: 77
DRG: 871 | End: 2022-01-01
Attending: STUDENT IN AN ORGANIZED HEALTH CARE EDUCATION/TRAINING PROGRAM
Payer: COMMERCIAL

## 2022-01-01 ENCOUNTER — APPOINTMENT (OUTPATIENT)
Dept: CT IMAGING | Facility: CLINIC | Age: 77
DRG: 871 | End: 2022-01-01
Attending: STUDENT IN AN ORGANIZED HEALTH CARE EDUCATION/TRAINING PROGRAM
Payer: COMMERCIAL

## 2022-01-01 VITALS
DIASTOLIC BLOOD PRESSURE: 47 MMHG | SYSTOLIC BLOOD PRESSURE: 106 MMHG | OXYGEN SATURATION: 100 % | HEART RATE: 54 BPM | TEMPERATURE: 96.6 F | BODY MASS INDEX: 34.89 KG/M2 | HEIGHT: 65 IN | RESPIRATION RATE: 20 BRPM | WEIGHT: 209.44 LBS

## 2022-01-01 VITALS
TEMPERATURE: 97 F | WEIGHT: 203.48 LBS | RESPIRATION RATE: 18 BRPM | HEIGHT: 65 IN | SYSTOLIC BLOOD PRESSURE: 110 MMHG | BODY MASS INDEX: 33.9 KG/M2 | HEART RATE: 79 BPM | DIASTOLIC BLOOD PRESSURE: 35 MMHG | OXYGEN SATURATION: 98 %

## 2022-01-01 VITALS
OXYGEN SATURATION: 92 % | RESPIRATION RATE: 16 BRPM | DIASTOLIC BLOOD PRESSURE: 31 MMHG | WEIGHT: 214 LBS | HEIGHT: 65 IN | BODY MASS INDEX: 35.65 KG/M2 | SYSTOLIC BLOOD PRESSURE: 54 MMHG | HEART RATE: 98 BPM | TEMPERATURE: 98.2 F

## 2022-01-01 DIAGNOSIS — I27.20 PULMONARY HYPERTENSION (H): ICD-10-CM

## 2022-01-01 DIAGNOSIS — E11.22 TYPE 2 DIABETES MELLITUS WITH ESRD (END-STAGE RENAL DISEASE) (H): Primary | ICD-10-CM

## 2022-01-01 DIAGNOSIS — I95.3 HEMODIALYSIS-ASSOCIATED HYPOTENSION: ICD-10-CM

## 2022-01-01 DIAGNOSIS — E87.29 HYPERCAPNIC ACIDOSIS: ICD-10-CM

## 2022-01-01 DIAGNOSIS — E87.70 HYPERVOLEMIA, UNSPECIFIED HYPERVOLEMIA TYPE: ICD-10-CM

## 2022-01-01 DIAGNOSIS — N18.5 STAGE 5 CHRONIC KIDNEY DISEASE NOT ON CHRONIC DIALYSIS (H): ICD-10-CM

## 2022-01-01 DIAGNOSIS — J42 CHRONIC BRONCHITIS, UNSPECIFIED CHRONIC BRONCHITIS TYPE (H): ICD-10-CM

## 2022-01-01 DIAGNOSIS — I10 HYPERTENSION GOAL BP (BLOOD PRESSURE) < 140/90: ICD-10-CM

## 2022-01-01 DIAGNOSIS — R78.81 BACTEREMIA: ICD-10-CM

## 2022-01-01 DIAGNOSIS — Z51.5 COMFORT MEASURES ONLY STATUS: ICD-10-CM

## 2022-01-01 DIAGNOSIS — Z99.81 DEPENDENCE ON SUPPLEMENTAL OXYGEN: ICD-10-CM

## 2022-01-01 DIAGNOSIS — Z51.5 HOSPICE CARE PATIENT: Primary | ICD-10-CM

## 2022-01-01 DIAGNOSIS — J96.10 CHRONIC RESPIRATORY FAILURE, UNSPECIFIED WHETHER WITH HYPOXIA OR HYPERCAPNIA (H): ICD-10-CM

## 2022-01-01 DIAGNOSIS — I95.9 HYPOTENSION, UNSPECIFIED HYPOTENSION TYPE: ICD-10-CM

## 2022-01-01 DIAGNOSIS — J44.9 CHRONIC OBSTRUCTIVE PULMONARY DISEASE, UNSPECIFIED COPD TYPE (H): ICD-10-CM

## 2022-01-01 DIAGNOSIS — E11.22 TYPE 2 DIABETES MELLITUS WITH ESRD (END-STAGE RENAL DISEASE) (H): ICD-10-CM

## 2022-01-01 DIAGNOSIS — R78.81 GRAM-POSITIVE BACTEREMIA: Primary | ICD-10-CM

## 2022-01-01 DIAGNOSIS — Z20.822 CONTACT WITH AND (SUSPECTED) EXPOSURE TO COVID-19: ICD-10-CM

## 2022-01-01 DIAGNOSIS — Z11.52 ENCOUNTER FOR SCREENING LABORATORY TESTING FOR SEVERE ACUTE RESPIRATORY SYNDROME CORONAVIRUS 2 (SARS-COV-2): ICD-10-CM

## 2022-01-01 DIAGNOSIS — N18.6 TYPE 2 DIABETES MELLITUS WITH ESRD (END-STAGE RENAL DISEASE) (H): Primary | ICD-10-CM

## 2022-01-01 DIAGNOSIS — Z99.2 DEPENDENCE ON RENAL DIALYSIS (H): ICD-10-CM

## 2022-01-01 DIAGNOSIS — N18.6 TYPE 2 DIABETES MELLITUS WITH ESRD (END-STAGE RENAL DISEASE) (H): ICD-10-CM

## 2022-01-01 DIAGNOSIS — E55.9 VITAMIN D DEFICIENCY DISEASE: ICD-10-CM

## 2022-01-01 DIAGNOSIS — F17.200 SMOKER: ICD-10-CM

## 2022-01-01 LAB
ALBUMIN SERPL BCG-MCNC: 2.9 G/DL (ref 3.5–5.2)
ALBUMIN SERPL BCG-MCNC: 3 G/DL (ref 3.5–5.2)
ALBUMIN SERPL BCG-MCNC: 3.4 G/DL (ref 3.5–5.2)
ALBUMIN SERPL BCG-MCNC: 3.4 G/DL (ref 3.5–5.2)
ALBUMIN SERPL BCG-MCNC: 3.5 G/DL (ref 3.5–5.2)
ALBUMIN SERPL BCG-MCNC: 3.6 G/DL (ref 3.5–5.2)
ALBUMIN SERPL BCG-MCNC: 3.6 G/DL (ref 3.5–5.2)
ALP SERPL-CCNC: 228 U/L (ref 35–104)
ALP SERPL-CCNC: 312 U/L (ref 35–104)
ALP SERPL-CCNC: 346 U/L (ref 35–104)
ALT SERPL W P-5'-P-CCNC: 13 U/L (ref 10–35)
ALT SERPL W P-5'-P-CCNC: 14 U/L (ref 10–35)
ALT SERPL W P-5'-P-CCNC: 8 U/L (ref 10–35)
ALT SERPL-CCNC: 20 U/L (ref 10–49)
ANION GAP SERPL CALCULATED.3IONS-SCNC: 10 MMOL/L (ref 7–15)
ANION GAP SERPL CALCULATED.3IONS-SCNC: 11 MMOL/L (ref 7–15)
ANION GAP SERPL CALCULATED.3IONS-SCNC: 12 MMOL/L (ref 7–15)
ANION GAP SERPL CALCULATED.3IONS-SCNC: 13 MMOL/L (ref 7–15)
ANION GAP SERPL CALCULATED.3IONS-SCNC: 18 MMOL/L (ref 7–15)
ANION GAP SERPL CALCULATED.3IONS-SCNC: 18 MMOL/L (ref 7–15)
ANION GAP SERPL CALCULATED.3IONS-SCNC: 7 MMOL/L (ref 7–15)
ANION GAP SERPL CALCULATED.3IONS-SCNC: 8 MMOL/L (ref 7–15)
ANION GAP SERPL CALCULATED.3IONS-SCNC: 9 MMOL/L (ref 7–15)
AST SERPL W P-5'-P-CCNC: 21 U/L (ref 10–35)
AST SERPL W P-5'-P-CCNC: 23 U/L (ref 10–35)
AST SERPL W P-5'-P-CCNC: 37 U/L (ref 10–35)
AST SERPL-CCNC: 29 U/L (ref 0–33)
ATRIAL RATE - MUSE: 278 BPM
ATRIAL RATE - MUSE: 53 BPM
ATRIAL RATE - MUSE: 64 BPM
ATRIAL RATE - MUSE: 68 BPM
ATRIAL RATE - MUSE: 95 BPM
BACTERIA BLD CULT: ABNORMAL
BACTERIA BLD CULT: ABNORMAL
BACTERIA BLD CULT: NO GROWTH
BASE EXCESS BLDA CALC-SCNC: 3.4 MMOL/L (ref -9–1.8)
BASE EXCESS BLDA CALC-SCNC: 3.9 MMOL/L (ref -9–1.8)
BASE EXCESS BLDV CALC-SCNC: 1.2 MMOL/L (ref -7.7–1.9)
BASOPHILS # BLD AUTO: 0 10E3/UL (ref 0–0.2)
BASOPHILS # BLD AUTO: 0 10E3/UL (ref 0–0.2)
BASOPHILS # BLD AUTO: 0.1 10E3/UL (ref 0–0.2)
BASOPHILS # BLD AUTO: 0.1 10E3/UL (ref 0–0.2)
BASOPHILS NFR BLD AUTO: 0 %
BASOPHILS NFR BLD AUTO: 1 %
BASOPHILS NFR BLD AUTO: 1 %
BASOPHILS NFR BLD AUTO: 2 %
BILIRUB SERPL-MCNC: 0.3 MG/DL
BILIRUB SERPL-MCNC: 0.4 MG/DL
BILIRUB SERPL-MCNC: 0.5 MG/DL
BUN SERPL-MCNC: 19.8 MG/DL (ref 8–23)
BUN SERPL-MCNC: 20.6 MG/DL (ref 8–23)
BUN SERPL-MCNC: 21.1 MG/DL (ref 8–23)
BUN SERPL-MCNC: 21.8 MG/DL (ref 8–23)
BUN SERPL-MCNC: 22.5 MG/DL (ref 8–23)
BUN SERPL-MCNC: 24.8 MG/DL (ref 8–23)
BUN SERPL-MCNC: 25.1 MG/DL (ref 8–23)
BUN SERPL-MCNC: 25.2 MG/DL (ref 8–23)
BUN SERPL-MCNC: 25.6 MG/DL (ref 8–23)
BUN SERPL-MCNC: 25.7 MG/DL (ref 8–23)
BUN SERPL-MCNC: 26.4 MG/DL (ref 8–23)
BUN SERPL-MCNC: 30 MG/DL (ref 8–23)
BUN SERPL-MCNC: 31.1 MG/DL (ref 8–23)
BUN SERPL-MCNC: 31.5 MG/DL (ref 8–23)
BUN SERPL-MCNC: 32.1 MG/DL (ref 8–23)
BUN SERPL-MCNC: 32.9 MG/DL (ref 8–23)
BUN SERPL-MCNC: 36.2 MG/DL (ref 8–23)
BUN SERPL-MCNC: 36.3 MG/DL (ref 8–23)
BUN SERPL-MCNC: 40.2 MG/DL (ref 8–23)
CA-I BLD-MCNC: 4.3 MG/DL (ref 4.4–5.2)
CA-I BLD-MCNC: 4.7 MG/DL (ref 4.4–5.2)
CALCIUM SERPL-MCNC: 7.8 MG/DL (ref 8.8–10.2)
CALCIUM SERPL-MCNC: 8 MG/DL (ref 8.8–10.2)
CALCIUM SERPL-MCNC: 8.2 MG/DL (ref 8.8–10.2)
CALCIUM SERPL-MCNC: 8.2 MG/DL (ref 8.8–10.2)
CALCIUM SERPL-MCNC: 8.3 MG/DL (ref 8.8–10.2)
CALCIUM SERPL-MCNC: 8.3 MG/DL (ref 8.8–10.2)
CALCIUM SERPL-MCNC: 8.4 MG/DL (ref 8.8–10.2)
CALCIUM SERPL-MCNC: 8.5 MG/DL (ref 8.8–10.2)
CALCIUM SERPL-MCNC: 8.6 MG/DL (ref 8.8–10.2)
CALCIUM SERPL-MCNC: 8.6 MG/DL (ref 8.8–10.2)
CALCIUM SERPL-MCNC: 8.7 MG/DL (ref 8.8–10.2)
CALCIUM SERPL-MCNC: 8.8 MG/DL (ref 8.8–10.2)
CALCIUM SERPL-MCNC: 8.9 MG/DL (ref 8.8–10.2)
CALCIUM SERPL-MCNC: 9 MG/DL (ref 8.8–10.2)
CALCIUM SERPL-MCNC: 9.2 MG/DL (ref 8.8–10.2)
CHLORIDE SERPL-SCNC: 100 MMOL/L (ref 98–107)
CHLORIDE SERPL-SCNC: 92 MMOL/L (ref 98–107)
CHLORIDE SERPL-SCNC: 93 MMOL/L (ref 98–107)
CHLORIDE SERPL-SCNC: 94 MMOL/L (ref 98–107)
CHLORIDE SERPL-SCNC: 95 MMOL/L (ref 98–107)
CHLORIDE SERPL-SCNC: 95 MMOL/L (ref 98–107)
CHLORIDE SERPL-SCNC: 96 MMOL/L (ref 98–107)
CHLORIDE SERPL-SCNC: 96 MMOL/L (ref 98–107)
CHLORIDE SERPL-SCNC: 97 MMOL/L (ref 98–107)
CHLORIDE SERPL-SCNC: 98 MMOL/L (ref 98–107)
CHLORIDE SERPL-SCNC: 98 MMOL/L (ref 98–107)
CPB POCT: NO
CREAT SERPL-MCNC: 2.22 MG/DL (ref 0.51–0.95)
CREAT SERPL-MCNC: 2.29 MG/DL (ref 0.51–0.95)
CREAT SERPL-MCNC: 2.38 MG/DL (ref 0.51–0.95)
CREAT SERPL-MCNC: 2.4 MG/DL (ref 0.51–0.95)
CREAT SERPL-MCNC: 2.44 MG/DL (ref 0.51–0.95)
CREAT SERPL-MCNC: 2.56 MG/DL (ref 0.51–0.95)
CREAT SERPL-MCNC: 2.81 MG/DL (ref 0.51–0.95)
CREAT SERPL-MCNC: 2.93 MG/DL (ref 0.51–0.95)
CREAT SERPL-MCNC: 2.98 MG/DL (ref 0.51–0.95)
CREAT SERPL-MCNC: 3.03 MG/DL (ref 0.51–0.95)
CREAT SERPL-MCNC: 3.07 MG/DL (ref 0.51–0.95)
CREAT SERPL-MCNC: 3.34 MG/DL (ref 0.51–0.95)
CREAT SERPL-MCNC: 3.51 MG/DL (ref 0.51–0.95)
CREAT SERPL-MCNC: 3.61 MG/DL (ref 0.51–0.95)
CREAT SERPL-MCNC: 3.67 MG/DL (ref 0.51–0.95)
CREAT SERPL-MCNC: 3.99 MG/DL (ref 0.51–0.95)
CREAT SERPL-MCNC: 4 MG/DL (ref 0.51–0.95)
CREAT SERPL-MCNC: 4.28 MG/DL (ref 0.51–0.95)
CREAT SERPL-MCNC: 4.54 MG/DL (ref 0.51–0.95)
CREATININE (EXTERNAL): 3.24 MG/DL (ref 0.5–1.1)
CRP SERPL-MCNC: 14.6 MG/L
D DIMER PPP FEU-MCNC: 3.66 UG/ML FEU (ref 0–0.5)
DEPRECATED HCO3 PLAS-SCNC: 16 MMOL/L (ref 22–29)
DEPRECATED HCO3 PLAS-SCNC: 22 MMOL/L (ref 22–29)
DEPRECATED HCO3 PLAS-SCNC: 23 MMOL/L (ref 22–29)
DEPRECATED HCO3 PLAS-SCNC: 25 MMOL/L (ref 22–29)
DEPRECATED HCO3 PLAS-SCNC: 26 MMOL/L (ref 22–29)
DEPRECATED HCO3 PLAS-SCNC: 27 MMOL/L (ref 22–29)
DEPRECATED HCO3 PLAS-SCNC: 27 MMOL/L (ref 22–29)
DEPRECATED HCO3 PLAS-SCNC: 29 MMOL/L (ref 22–29)
DEPRECATED HCO3 PLAS-SCNC: 29 MMOL/L (ref 22–29)
DIASTOLIC BLOOD PRESSURE - MUSE: NORMAL MMHG
ENTEROCOCCUS FAECALIS: NOT DETECTED
ENTEROCOCCUS FAECIUM: NOT DETECTED
EOSINOPHIL # BLD AUTO: 0 10E3/UL (ref 0–0.7)
EOSINOPHIL # BLD AUTO: 0 10E3/UL (ref 0–0.7)
EOSINOPHIL # BLD AUTO: 0.1 10E3/UL (ref 0–0.7)
EOSINOPHIL # BLD AUTO: 0.4 10E3/UL (ref 0–0.7)
EOSINOPHIL NFR BLD AUTO: 0 %
EOSINOPHIL NFR BLD AUTO: 1 %
EOSINOPHIL NFR BLD AUTO: 1 %
EOSINOPHIL NFR BLD AUTO: 9 %
ERYTHROCYTE [DISTWIDTH] IN BLOOD BY AUTOMATED COUNT: 17.1 % (ref 10–15)
ERYTHROCYTE [DISTWIDTH] IN BLOOD BY AUTOMATED COUNT: 17.2 % (ref 10–15)
ERYTHROCYTE [DISTWIDTH] IN BLOOD BY AUTOMATED COUNT: 17.3 % (ref 10–15)
ERYTHROCYTE [DISTWIDTH] IN BLOOD BY AUTOMATED COUNT: 17.3 % (ref 10–15)
ERYTHROCYTE [DISTWIDTH] IN BLOOD BY AUTOMATED COUNT: 17.6 % (ref 10–15)
ERYTHROCYTE [DISTWIDTH] IN BLOOD BY AUTOMATED COUNT: 17.8 % (ref 10–15)
ERYTHROCYTE [DISTWIDTH] IN BLOOD BY AUTOMATED COUNT: 17.9 % (ref 10–15)
ERYTHROCYTE [DISTWIDTH] IN BLOOD BY AUTOMATED COUNT: 18.1 % (ref 10–15)
ERYTHROCYTE [DISTWIDTH] IN BLOOD BY AUTOMATED COUNT: 18.9 % (ref 10–15)
ERYTHROCYTE [DISTWIDTH] IN BLOOD BY AUTOMATED COUNT: 19.1 % (ref 10–15)
ERYTHROCYTE [DISTWIDTH] IN BLOOD BY AUTOMATED COUNT: 19.1 % (ref 10–15)
ERYTHROCYTE [DISTWIDTH] IN BLOOD BY AUTOMATED COUNT: 19.2 % (ref 10–15)
ERYTHROCYTE [DISTWIDTH] IN BLOOD BY AUTOMATED COUNT: 19.3 % (ref 10–15)
ERYTHROCYTE [DISTWIDTH] IN BLOOD BY AUTOMATED COUNT: 19.4 % (ref 10–15)
FERRITIN SERPL-MCNC: 667 NG/ML (ref 11–328)
GAMMA INTERFERON BACKGROUND BLD IA-ACNC: 0.07 IU/ML
GFR SERPL CREATININE-BSD FRML MDRD: 10 ML/MIN/1.73M2
GFR SERPL CREATININE-BSD FRML MDRD: 11 ML/MIN/1.73M2
GFR SERPL CREATININE-BSD FRML MDRD: 11 ML/MIN/1.73M2
GFR SERPL CREATININE-BSD FRML MDRD: 12 ML/MIN/1.73M2
GFR SERPL CREATININE-BSD FRML MDRD: 12 ML/MIN/1.73M2
GFR SERPL CREATININE-BSD FRML MDRD: 13 ML/MIN/1.73M2
GFR SERPL CREATININE-BSD FRML MDRD: 14 ML/MIN/1.73M2
GFR SERPL CREATININE-BSD FRML MDRD: 15 ML/MIN/1.73M2
GFR SERPL CREATININE-BSD FRML MDRD: 15 ML/MIN/1.73M2
GFR SERPL CREATININE-BSD FRML MDRD: 16 ML/MIN/1.73M2
GFR SERPL CREATININE-BSD FRML MDRD: 16 ML/MIN/1.73M2
GFR SERPL CREATININE-BSD FRML MDRD: 17 ML/MIN/1.73M2
GFR SERPL CREATININE-BSD FRML MDRD: 19 ML/MIN/1.73M2
GFR SERPL CREATININE-BSD FRML MDRD: 20 ML/MIN/1.73M2
GFR SERPL CREATININE-BSD FRML MDRD: 20 ML/MIN/1.73M2
GFR SERPL CREATININE-BSD FRML MDRD: 21 ML/MIN/1.73M2
GFR SERPL CREATININE-BSD FRML MDRD: 21 ML/MIN/1.73M2
GFR SERPL CREATININE-BSD FRML MDRD: 22 ML/MIN/1.73M2
GFR SERPL CREATININE-BSD FRML MDRD: 9 ML/MIN/1.73M2
GLUCOSE BLD-MCNC: 125 MG/DL (ref 70–99)
GLUCOSE BLDC GLUCOMTR-MCNC: 106 MG/DL (ref 70–99)
GLUCOSE BLDC GLUCOMTR-MCNC: 107 MG/DL (ref 70–99)
GLUCOSE BLDC GLUCOMTR-MCNC: 110 MG/DL (ref 70–99)
GLUCOSE BLDC GLUCOMTR-MCNC: 113 MG/DL (ref 70–99)
GLUCOSE BLDC GLUCOMTR-MCNC: 118 MG/DL (ref 70–99)
GLUCOSE BLDC GLUCOMTR-MCNC: 118 MG/DL (ref 70–99)
GLUCOSE BLDC GLUCOMTR-MCNC: 122 MG/DL (ref 70–99)
GLUCOSE BLDC GLUCOMTR-MCNC: 128 MG/DL (ref 70–99)
GLUCOSE BLDC GLUCOMTR-MCNC: 177 MG/DL (ref 70–99)
GLUCOSE BLDC GLUCOMTR-MCNC: 89 MG/DL (ref 70–99)
GLUCOSE SERPL-MCNC: 101 MG/DL (ref 70–99)
GLUCOSE SERPL-MCNC: 102 MG/DL (ref 70–99)
GLUCOSE SERPL-MCNC: 103 MG/DL (ref 70–99)
GLUCOSE SERPL-MCNC: 111 MG/DL (ref 70–99)
GLUCOSE SERPL-MCNC: 114 MG/DL (ref 70–99)
GLUCOSE SERPL-MCNC: 115 MG/DL (ref 70–99)
GLUCOSE SERPL-MCNC: 119 MG/DL (ref 70–99)
GLUCOSE SERPL-MCNC: 123 MG/DL (ref 70–99)
GLUCOSE SERPL-MCNC: 124 MG/DL (ref 70–99)
GLUCOSE SERPL-MCNC: 124 MG/DL (ref 70–99)
GLUCOSE SERPL-MCNC: 125 MG/DL (ref 70–99)
GLUCOSE SERPL-MCNC: 125 MG/DL (ref 70–99)
GLUCOSE SERPL-MCNC: 130 MG/DL (ref 70–99)
GLUCOSE SERPL-MCNC: 142 MG/DL (ref 70–99)
GLUCOSE SERPL-MCNC: 144 MG/DL (ref 70–99)
GLUCOSE SERPL-MCNC: 146 MG/DL (ref 70–99)
GLUCOSE SERPL-MCNC: 172 MG/DL (ref 70–99)
GLUCOSE SERPL-MCNC: 80 MG/DL (ref 70–99)
GLUCOSE SERPL-MCNC: 87 MG/DL (ref 70–99)
HBV SURFACE AB SERPL IA-ACNC: 95.1 M[IU]/ML
HBV SURFACE AG SERPL QL IA: NONREACTIVE
HCO3 BLD-SCNC: 30 MMOL/L (ref 21–28)
HCO3 BLD-SCNC: 31 MMOL/L (ref 21–28)
HCO3 BLDV-SCNC: 29 MMOL/L (ref 21–28)
HCO3 BLDV-SCNC: 32 MMOL/L (ref 21–28)
HCO3 BLDV-SCNC: 34 MMOL/L (ref 21–28)
HCT VFR BLD AUTO: 23.5 % (ref 35–47)
HCT VFR BLD AUTO: 24.4 % (ref 35–47)
HCT VFR BLD AUTO: 24.9 % (ref 35–47)
HCT VFR BLD AUTO: 25.4 % (ref 35–47)
HCT VFR BLD AUTO: 25.6 % (ref 35–47)
HCT VFR BLD AUTO: 25.7 % (ref 35–47)
HCT VFR BLD AUTO: 26.8 % (ref 35–47)
HCT VFR BLD AUTO: 28.1 % (ref 35–47)
HCT VFR BLD AUTO: 28.5 % (ref 35–47)
HCT VFR BLD AUTO: 30.3 % (ref 35–47)
HCT VFR BLD AUTO: 31.5 % (ref 35–47)
HCT VFR BLD AUTO: 31.6 % (ref 35–47)
HCT VFR BLD AUTO: 32.4 % (ref 35–47)
HCT VFR BLD AUTO: 32.5 % (ref 35–47)
HCT VFR BLD AUTO: 33.6 % (ref 35–47)
HCT VFR BLD AUTO: 33.9 % (ref 35–47)
HCT VFR BLD AUTO: 34.1 % (ref 35–47)
HCT VFR BLD AUTO: 34.1 % (ref 35–47)
HCT VFR BLD CALC: 26 % (ref 35–47)
HEP C HIM: NORMAL
HGB BLD-MCNC: 10.1 G/DL (ref 11.7–15.7)
HGB BLD-MCNC: 6.9 G/DL (ref 11.7–15.7)
HGB BLD-MCNC: 7.1 G/DL (ref 11.7–15.7)
HGB BLD-MCNC: 7.2 G/DL (ref 11.7–15.7)
HGB BLD-MCNC: 7.2 G/DL (ref 11.7–15.7)
HGB BLD-MCNC: 7.6 G/DL (ref 11.7–15.7)
HGB BLD-MCNC: 7.7 G/DL (ref 11.7–15.7)
HGB BLD-MCNC: 8.5 G/DL (ref 11.7–15.7)
HGB BLD-MCNC: 8.6 G/DL (ref 11.7–15.7)
HGB BLD-MCNC: 8.8 G/DL (ref 11.7–15.7)
HGB BLD-MCNC: 9.3 G/DL (ref 11.7–15.7)
HGB BLD-MCNC: 9.3 G/DL (ref 11.7–15.7)
HGB BLD-MCNC: 9.4 G/DL (ref 11.7–15.7)
HGB BLD-MCNC: 9.4 G/DL (ref 11.7–15.7)
HGB BLD-MCNC: 9.7 G/DL (ref 11.7–15.7)
HGB BLD-MCNC: 9.8 G/DL (ref 11.7–15.7)
HGB BLD-MCNC: 9.9 G/DL (ref 11.7–15.7)
HGB BLD-MCNC: 9.9 G/DL (ref 11.7–15.7)
HOLD SPECIMEN: NORMAL
IMM GRANULOCYTES # BLD: 0 10E3/UL
IMM GRANULOCYTES NFR BLD: 0 %
INR PPP: 1.21 (ref 0.85–1.15)
INTERPRETATION ECG - MUSE: NORMAL
IRON BINDING CAPACITY (ROCHE): 222 UG/DL (ref 240–430)
IRON SATN MFR SERPL: 16 % (ref 15–46)
IRON SERPL-MCNC: 35 UG/DL (ref 37–145)
LACTATE BLD-SCNC: 0.7 MMOL/L
LACTATE SERPL-SCNC: 0.8 MMOL/L (ref 0.7–2)
LACTATE SERPL-SCNC: 1 MMOL/L (ref 0.7–2)
LISTERIA SPECIES (DETECTED/NOT DETECTED): NOT DETECTED
LVEF ECHO: NORMAL
LYMPHOCYTES # BLD AUTO: 0.2 10E3/UL (ref 0.8–5.3)
LYMPHOCYTES # BLD AUTO: 0.2 10E3/UL (ref 0.8–5.3)
LYMPHOCYTES # BLD AUTO: 0.3 10E3/UL (ref 0.8–5.3)
LYMPHOCYTES # BLD AUTO: 0.3 10E3/UL (ref 0.8–5.3)
LYMPHOCYTES NFR BLD AUTO: 3 %
LYMPHOCYTES NFR BLD AUTO: 4 %
LYMPHOCYTES NFR BLD AUTO: 6 %
LYMPHOCYTES NFR BLD AUTO: 7 %
M TB IFN-G BLD-IMP: NEGATIVE
M TB IFN-G CD4+ BCKGRND COR BLD-ACNC: 3.2 IU/ML
MAGNESIUM SERPL-MCNC: 1.5 MG/DL (ref 1.7–2.3)
MAGNESIUM SERPL-MCNC: 1.7 MG/DL (ref 1.7–2.3)
MCH RBC QN AUTO: 31.4 PG (ref 26.5–33)
MCH RBC QN AUTO: 31.4 PG (ref 26.5–33)
MCH RBC QN AUTO: 31.5 PG (ref 26.5–33)
MCH RBC QN AUTO: 31.5 PG (ref 26.5–33)
MCH RBC QN AUTO: 31.8 PG (ref 26.5–33)
MCH RBC QN AUTO: 32.4 PG (ref 26.5–33)
MCH RBC QN AUTO: 32.4 PG (ref 26.5–33)
MCH RBC QN AUTO: 32.6 PG (ref 26.5–33)
MCH RBC QN AUTO: 32.7 PG (ref 26.5–33)
MCH RBC QN AUTO: 32.7 PG (ref 26.5–33)
MCH RBC QN AUTO: 32.8 PG (ref 26.5–33)
MCH RBC QN AUTO: 32.9 PG (ref 26.5–33)
MCH RBC QN AUTO: 32.9 PG (ref 26.5–33)
MCH RBC QN AUTO: 33.1 PG (ref 26.5–33)
MCH RBC QN AUTO: 33.2 PG (ref 26.5–33)
MCHC RBC AUTO-ENTMCNC: 28.4 G/DL (ref 31.5–36.5)
MCHC RBC AUTO-ENTMCNC: 28.9 G/DL (ref 31.5–36.5)
MCHC RBC AUTO-ENTMCNC: 29 G/DL (ref 31.5–36.5)
MCHC RBC AUTO-ENTMCNC: 29 G/DL (ref 31.5–36.5)
MCHC RBC AUTO-ENTMCNC: 29.2 G/DL (ref 31.5–36.5)
MCHC RBC AUTO-ENTMCNC: 29.2 G/DL (ref 31.5–36.5)
MCHC RBC AUTO-ENTMCNC: 29.4 G/DL (ref 31.5–36.5)
MCHC RBC AUTO-ENTMCNC: 29.4 G/DL (ref 31.5–36.5)
MCHC RBC AUTO-ENTMCNC: 29.5 G/DL (ref 31.5–36.5)
MCHC RBC AUTO-ENTMCNC: 29.6 G/DL (ref 31.5–36.5)
MCHC RBC AUTO-ENTMCNC: 29.7 G/DL (ref 31.5–36.5)
MCHC RBC AUTO-ENTMCNC: 29.8 G/DL (ref 31.5–36.5)
MCHC RBC AUTO-ENTMCNC: 29.9 G/DL (ref 31.5–36.5)
MCHC RBC AUTO-ENTMCNC: 30 G/DL (ref 31.5–36.5)
MCHC RBC AUTO-ENTMCNC: 30.2 G/DL (ref 31.5–36.5)
MCHC RBC AUTO-ENTMCNC: 30.2 G/DL (ref 31.5–36.5)
MCHC RBC AUTO-ENTMCNC: 31 G/DL (ref 31.5–36.5)
MCV RBC AUTO: 105 FL (ref 78–100)
MCV RBC AUTO: 105 FL (ref 78–100)
MCV RBC AUTO: 107 FL (ref 78–100)
MCV RBC AUTO: 108 FL (ref 78–100)
MCV RBC AUTO: 109 FL (ref 78–100)
MCV RBC AUTO: 110 FL (ref 78–100)
MCV RBC AUTO: 111 FL (ref 78–100)
MCV RBC AUTO: 113 FL (ref 78–100)
MCV RBC AUTO: 116 FL (ref 78–100)
MITOGEN IGNF BCKGRD COR BLD-ACNC: 0 IU/ML
MITOGEN IGNF BCKGRD COR BLD-ACNC: 0.01 IU/ML
MONOCYTES # BLD AUTO: 0.1 10E3/UL (ref 0–1.3)
MONOCYTES # BLD AUTO: 0.1 10E3/UL (ref 0–1.3)
MONOCYTES # BLD AUTO: 0.3 10E3/UL (ref 0–1.3)
MONOCYTES # BLD AUTO: 0.4 10E3/UL (ref 0–1.3)
MONOCYTES NFR BLD AUTO: 1 %
MONOCYTES NFR BLD AUTO: 2 %
MONOCYTES NFR BLD AUTO: 7 %
MONOCYTES NFR BLD AUTO: 8 %
MRSA DNA SPEC QL NAA+PROBE: NEGATIVE
NEUTROPHILS # BLD AUTO: 3.4 10E3/UL (ref 1.6–8.3)
NEUTROPHILS # BLD AUTO: 4.1 10E3/UL (ref 1.6–8.3)
NEUTROPHILS # BLD AUTO: 4.6 10E3/UL (ref 1.6–8.3)
NEUTROPHILS # BLD AUTO: 5.2 10E3/UL (ref 1.6–8.3)
NEUTROPHILS NFR BLD AUTO: 75 %
NEUTROPHILS NFR BLD AUTO: 84 %
NEUTROPHILS NFR BLD AUTO: 93 %
NEUTROPHILS NFR BLD AUTO: 95 %
NRBC # BLD AUTO: 0 10E3/UL
NRBC BLD AUTO-RTO: 0 /100
NT-PROBNP SERPL-MCNC: ABNORMAL PG/ML (ref 0–1800)
O2/TOTAL GAS SETTING VFR VENT: 2 %
O2/TOTAL GAS SETTING VFR VENT: 2 %
O2/TOTAL GAS SETTING VFR VENT: 3 %
OXYHGB MFR BLD: 91 % (ref 92–100)
P AXIS - MUSE: -82 DEGREES
P AXIS - MUSE: 21 DEGREES
P AXIS - MUSE: 82 DEGREES
P AXIS - MUSE: NORMAL DEGREES
P AXIS - MUSE: NORMAL DEGREES
PCO2 BLD: 57 MM HG (ref 35–45)
PCO2 BLD: 59 MM HG (ref 35–45)
PCO2 BLDV: 67 MM HG (ref 40–50)
PCO2 BLDV: 72 MM HG (ref 40–50)
PCO2 BLDV: 76 MM HG (ref 40–50)
PH BLD: 7.33 [PH] (ref 7.35–7.45)
PH BLD: 7.34 [PH] (ref 7.35–7.45)
PH BLDV: 7.24 [PH] (ref 7.32–7.43)
PH BLDV: 7.25 [PH] (ref 7.32–7.43)
PH BLDV: 7.26 [PH] (ref 7.32–7.43)
PHOSPHATE SERPL-MCNC: 3.8 MG/DL (ref 2.5–4.5)
PHOSPHATE SERPL-MCNC: 4.6 MG/DL (ref 2.5–4.5)
PHOSPHATE SERPL-MCNC: 4.9 MG/DL (ref 2.5–4.5)
PHOSPHATE SERPL-MCNC: 4.9 MG/DL (ref 2.5–4.5)
PHOSPHATE SERPL-MCNC: 5.3 MG/DL (ref 2.5–4.5)
PHOSPHATE SERPL-MCNC: 5.7 MG/DL (ref 2.5–4.5)
PHOSPHATE SERPL-MCNC: 5.9 MG/DL (ref 2.5–4.5)
PLATELET # BLD AUTO: 143 10E3/UL (ref 150–450)
PLATELET # BLD AUTO: 170 10E3/UL (ref 150–450)
PLATELET # BLD AUTO: 197 10E3/UL (ref 150–450)
PLATELET # BLD AUTO: 214 10E3/UL (ref 150–450)
PLATELET # BLD AUTO: 216 10E3/UL (ref 150–450)
PLATELET # BLD AUTO: 223 10E3/UL (ref 150–450)
PLATELET # BLD AUTO: 224 10E3/UL (ref 150–450)
PLATELET # BLD AUTO: 226 10E3/UL (ref 150–450)
PLATELET # BLD AUTO: 229 10E3/UL (ref 150–450)
PLATELET # BLD AUTO: 232 10E3/UL (ref 150–450)
PLATELET # BLD AUTO: 234 10E3/UL (ref 150–450)
PLATELET # BLD AUTO: 234 10E3/UL (ref 150–450)
PLATELET # BLD AUTO: 246 10E3/UL (ref 150–450)
PLATELET # BLD AUTO: 248 10E3/UL (ref 150–450)
PLATELET # BLD AUTO: 252 10E3/UL (ref 150–450)
PLATELET # BLD AUTO: 257 10E3/UL (ref 150–450)
PLATELET # BLD AUTO: 263 10E3/UL (ref 150–450)
PO2 BLD: 73 MM HG (ref 80–105)
PO2 BLD: 73 MM HG (ref 80–105)
PO2 BLDV: 25 MM HG (ref 25–47)
PO2 BLDV: 26 MM HG (ref 25–47)
PO2 BLDV: 55 MM HG (ref 25–47)
POTASSIUM (EXTERNAL): 4.4 MEQ/L (ref 3.5–5.5)
POTASSIUM BLD-SCNC: 4 MMOL/L (ref 3.4–5.3)
POTASSIUM SERPL-SCNC: 3.7 MMOL/L (ref 3.4–5.3)
POTASSIUM SERPL-SCNC: 4 MMOL/L (ref 3.4–5.3)
POTASSIUM SERPL-SCNC: 4 MMOL/L (ref 3.4–5.3)
POTASSIUM SERPL-SCNC: 4.1 MMOL/L (ref 3.4–5.3)
POTASSIUM SERPL-SCNC: 4.1 MMOL/L (ref 3.4–5.3)
POTASSIUM SERPL-SCNC: 4.2 MMOL/L (ref 3.4–5.3)
POTASSIUM SERPL-SCNC: 4.4 MMOL/L (ref 3.4–5.3)
POTASSIUM SERPL-SCNC: 4.4 MMOL/L (ref 3.4–5.3)
POTASSIUM SERPL-SCNC: 4.5 MMOL/L (ref 3.4–5.3)
POTASSIUM SERPL-SCNC: 4.7 MMOL/L (ref 3.4–5.3)
POTASSIUM SERPL-SCNC: 4.8 MMOL/L (ref 3.4–5.3)
POTASSIUM SERPL-SCNC: 4.9 MMOL/L (ref 3.4–5.3)
POTASSIUM SERPL-SCNC: 5.1 MMOL/L (ref 3.4–5.3)
POTASSIUM SERPL-SCNC: 5.2 MMOL/L (ref 3.4–5.3)
POTASSIUM SERPL-SCNC: 5.3 MMOL/L (ref 3.4–5.3)
POTASSIUM SERPL-SCNC: 5.3 MMOL/L (ref 3.4–5.3)
POTASSIUM SERPL-SCNC: 5.6 MMOL/L (ref 3.4–5.3)
PR INTERVAL - MUSE: 180 MS
PR INTERVAL - MUSE: 214 MS
PR INTERVAL - MUSE: 240 MS
PR INTERVAL - MUSE: NORMAL MS
PR INTERVAL - MUSE: NORMAL MS
PROCALCITONIN SERPL IA-MCNC: 0.44 NG/ML
PROT SERPL-MCNC: 6 G/DL (ref 6.4–8.3)
PROT SERPL-MCNC: 6.1 G/DL (ref 6.4–8.3)
PROT SERPL-MCNC: 6.9 G/DL (ref 6.4–8.3)
QRS DURATION - MUSE: 82 MS
QRS DURATION - MUSE: 82 MS
QRS DURATION - MUSE: 84 MS
QRS DURATION - MUSE: 84 MS
QRS DURATION - MUSE: 88 MS
QT - MUSE: 336 MS
QT - MUSE: 376 MS
QT - MUSE: 412 MS
QT - MUSE: 424 MS
QT - MUSE: 438 MS
QTC - MUSE: 397 MS
QTC - MUSE: 421 MS
QTC - MUSE: 422 MS
QTC - MUSE: 444 MS
QTC - MUSE: 451 MS
QUANTIFERON MITOGEN: 3.27 IU/ML
QUANTIFERON NIL TUBE: 0.07 IU/ML
QUANTIFERON TB1 TUBE: 0.07 IU/ML
QUANTIFERON TB2 TUBE: 0.08
R AXIS - MUSE: 105 DEGREES
R AXIS - MUSE: 107 DEGREES
R AXIS - MUSE: 115 DEGREES
R AXIS - MUSE: 115 DEGREES
R AXIS - MUSE: 99 DEGREES
RBC # BLD AUTO: 2.11 10E6/UL (ref 3.8–5.2)
RBC # BLD AUTO: 2.2 10E6/UL (ref 3.8–5.2)
RBC # BLD AUTO: 2.31 10E6/UL (ref 3.8–5.2)
RBC # BLD AUTO: 2.31 10E6/UL (ref 3.8–5.2)
RBC # BLD AUTO: 2.32 10E6/UL (ref 3.8–5.2)
RBC # BLD AUTO: 2.32 10E6/UL (ref 3.8–5.2)
RBC # BLD AUTO: 2.57 10E6/UL (ref 3.8–5.2)
RBC # BLD AUTO: 2.64 10E6/UL (ref 3.8–5.2)
RBC # BLD AUTO: 2.85 10E6/UL (ref 3.8–5.2)
RBC # BLD AUTO: 2.9 10E6/UL (ref 3.8–5.2)
RBC # BLD AUTO: 2.95 10E6/UL (ref 3.8–5.2)
RBC # BLD AUTO: 2.96 10E6/UL (ref 3.8–5.2)
RBC # BLD AUTO: 3.06 10E6/UL (ref 3.8–5.2)
RBC # BLD AUTO: 3.09 10E6/UL (ref 3.8–5.2)
RBC # BLD AUTO: 3.1 10E6/UL (ref 3.8–5.2)
RBC # BLD AUTO: 3.11 10E6/UL (ref 3.8–5.2)
RBC # BLD AUTO: 3.15 10E6/UL (ref 3.8–5.2)
SA TARGET DNA: NEGATIVE
SAO2 % BLDV: 34 % (ref 94–100)
SAO2 % BLDV: 36 % (ref 94–100)
SARS-COV-2 RNA RESP QL NAA+PROBE: NEGATIVE
SARS-COV-2 RNA RESP QL NAA+PROBE: NEGATIVE
SODIUM BLD-SCNC: 136 MMOL/L (ref 133–144)
SODIUM SERPL-SCNC: 127 MMOL/L (ref 136–145)
SODIUM SERPL-SCNC: 128 MMOL/L (ref 136–145)
SODIUM SERPL-SCNC: 129 MMOL/L (ref 136–145)
SODIUM SERPL-SCNC: 129 MMOL/L (ref 136–145)
SODIUM SERPL-SCNC: 130 MMOL/L (ref 136–145)
SODIUM SERPL-SCNC: 131 MMOL/L (ref 136–145)
SODIUM SERPL-SCNC: 132 MMOL/L (ref 136–145)
SODIUM SERPL-SCNC: 133 MMOL/L (ref 136–145)
SODIUM SERPL-SCNC: 133 MMOL/L (ref 136–145)
SODIUM SERPL-SCNC: 134 MMOL/L (ref 136–145)
SODIUM SERPL-SCNC: 135 MMOL/L (ref 136–145)
STAPHYLOCOCCUS AUREUS: NOT DETECTED
STAPHYLOCOCCUS EPIDERMIDIS: NOT DETECTED
STAPHYLOCOCCUS LUGDUNENSIS: NOT DETECTED
STAPHYLOCOCCUS SPECIES: NOT DETECTED
STREPTOCOCCUS AGALACTIAE: NOT DETECTED
STREPTOCOCCUS ANGINOSUS GROUP: NOT DETECTED
STREPTOCOCCUS PNEUMONIAE: NOT DETECTED
STREPTOCOCCUS PYOGENES: NOT DETECTED
STREPTOCOCCUS SPECIES: DETECTED
SYSTOLIC BLOOD PRESSURE - MUSE: NORMAL MMHG
T AXIS - MUSE: -78 DEGREES
T AXIS - MUSE: -85 DEGREES
T AXIS - MUSE: -88 DEGREES
T AXIS - MUSE: 247 DEGREES
T AXIS - MUSE: 266 DEGREES
T4 FREE SERPL-MCNC: 0.68 NG/DL (ref 0.9–1.7)
T4 FREE SERPL-MCNC: 1.05 NG/DL (ref 0.9–1.7)
TRANSFERRIN SERPL-MCNC: 182 MG/DL (ref 200–360)
TROPONIN T BLD-MCNC: 0.04 UG/L
TROPONIN T SERPL HS-MCNC: 113 NG/L
TROPONIN T SERPL HS-MCNC: 122 NG/L
TROPONIN T SERPL HS-MCNC: 152 NG/L
TROPONIN T SERPL HS-MCNC: 153 NG/L
TROPONIN T SERPL HS-MCNC: 156 NG/L
TSH SERPL DL<=0.005 MIU/L-ACNC: 22.2 UIU/ML (ref 0.3–4.2)
TSH SERPL DL<=0.005 MIU/L-ACNC: 5.1 UIU/ML (ref 0.3–4.2)
UFH PPP CHRO-ACNC: 0.4 IU/ML
UFH PPP CHRO-ACNC: >1.1 IU/ML
VENTRICULAR RATE- MUSE: 53 BPM
VENTRICULAR RATE- MUSE: 63 BPM
VENTRICULAR RATE- MUSE: 64 BPM
VENTRICULAR RATE- MUSE: 84 BPM
VENTRICULAR RATE- MUSE: 95 BPM
WBC # BLD AUTO: 4.2 10E3/UL (ref 4–11)
WBC # BLD AUTO: 4.5 10E3/UL (ref 4–11)
WBC # BLD AUTO: 4.6 10E3/UL (ref 4–11)
WBC # BLD AUTO: 4.6 10E3/UL (ref 4–11)
WBC # BLD AUTO: 4.7 10E3/UL (ref 4–11)
WBC # BLD AUTO: 4.8 10E3/UL (ref 4–11)
WBC # BLD AUTO: 5 10E3/UL (ref 4–11)
WBC # BLD AUTO: 5.1 10E3/UL (ref 4–11)
WBC # BLD AUTO: 5.3 10E3/UL (ref 4–11)
WBC # BLD AUTO: 5.3 10E3/UL (ref 4–11)
WBC # BLD AUTO: 5.5 10E3/UL (ref 4–11)
WBC # BLD AUTO: 6.1 10E3/UL (ref 4–11)
WBC # BLD AUTO: 6.6 10E3/UL (ref 4–11)
WBC # BLD AUTO: 6.7 10E3/UL (ref 4–11)
WBC # BLD AUTO: 7 10E3/UL (ref 4–11)
WBC # BLD AUTO: 7.3 10E3/UL (ref 4–11)
WBC # BLD AUTO: 8 10E3/UL (ref 4–11)

## 2022-01-01 PROCEDURE — 36415 COLL VENOUS BLD VENIPUNCTURE: CPT | Performed by: HOSPITALIST

## 2022-01-01 PROCEDURE — 99225 PR SUBSEQUENT OBSERVATION CARE,LEVEL II: CPT | Performed by: HOSPITALIST

## 2022-01-01 PROCEDURE — 999N000033 HC STATISTIC CHRONIC PULMONARY DISEASE SPECIALIST

## 2022-01-01 PROCEDURE — 87340 HEPATITIS B SURFACE AG IA: CPT | Performed by: STUDENT IN AN ORGANIZED HEALTH CARE EDUCATION/TRAINING PROGRAM

## 2022-01-01 PROCEDURE — 94640 AIRWAY INHALATION TREATMENT: CPT

## 2022-01-01 PROCEDURE — 94640 AIRWAY INHALATION TREATMENT: CPT | Mod: 76

## 2022-01-01 PROCEDURE — U0003 INFECTIOUS AGENT DETECTION BY NUCLEIC ACID (DNA OR RNA); SEVERE ACUTE RESPIRATORY SYNDROME CORONAVIRUS 2 (SARS-COV-2) (CORONAVIRUS DISEASE [COVID-19]), AMPLIFIED PROBE TECHNIQUE, MAKING USE OF HIGH THROUGHPUT TECHNOLOGIES AS DESCRIBED BY CMS-2020-01-R: HCPCS | Performed by: EMERGENCY MEDICINE

## 2022-01-01 PROCEDURE — 36415 COLL VENOUS BLD VENIPUNCTURE: CPT | Performed by: STUDENT IN AN ORGANIZED HEALTH CARE EDUCATION/TRAINING PROGRAM

## 2022-01-01 PROCEDURE — 250N000011 HC RX IP 250 OP 636: Performed by: STUDENT IN AN ORGANIZED HEALTH CARE EDUCATION/TRAINING PROGRAM

## 2022-01-01 PROCEDURE — 999N000157 HC STATISTIC RCP TIME EA 10 MIN

## 2022-01-01 PROCEDURE — 250N000013 HC RX MED GY IP 250 OP 250 PS 637: Performed by: HOSPITALIST

## 2022-01-01 PROCEDURE — 93306 TTE W/DOPPLER COMPLETE: CPT | Mod: 26 | Performed by: STUDENT IN AN ORGANIZED HEALTH CARE EDUCATION/TRAINING PROGRAM

## 2022-01-01 PROCEDURE — 90937 HEMODIALYSIS REPEATED EVAL: CPT

## 2022-01-01 PROCEDURE — 250N000009 HC RX 250

## 2022-01-01 PROCEDURE — 80048 BASIC METABOLIC PNL TOTAL CA: CPT | Performed by: STUDENT IN AN ORGANIZED HEALTH CARE EDUCATION/TRAINING PROGRAM

## 2022-01-01 PROCEDURE — 258N000003 HC RX IP 258 OP 636: Performed by: NURSE PRACTITIONER

## 2022-01-01 PROCEDURE — 36415 COLL VENOUS BLD VENIPUNCTURE: CPT | Performed by: PHYSICIAN ASSISTANT

## 2022-01-01 PROCEDURE — 99233 SBSQ HOSP IP/OBS HIGH 50: CPT | Mod: GC | Performed by: INTERNAL MEDICINE

## 2022-01-01 PROCEDURE — G0378 HOSPITAL OBSERVATION PER HR: HCPCS

## 2022-01-01 PROCEDURE — 97116 GAIT TRAINING THERAPY: CPT | Mod: GP

## 2022-01-01 PROCEDURE — 258N000003 HC RX IP 258 OP 636: Performed by: STUDENT IN AN ORGANIZED HEALTH CARE EDUCATION/TRAINING PROGRAM

## 2022-01-01 PROCEDURE — 71275 CT ANGIOGRAPHY CHEST: CPT | Mod: 26 | Performed by: RADIOLOGY

## 2022-01-01 PROCEDURE — 250N000013 HC RX MED GY IP 250 OP 250 PS 637

## 2022-01-01 PROCEDURE — P9045 ALBUMIN (HUMAN), 5%, 250 ML: HCPCS | Performed by: PHYSICIAN ASSISTANT

## 2022-01-01 PROCEDURE — 250N000013 HC RX MED GY IP 250 OP 250 PS 637: Performed by: STUDENT IN AN ORGANIZED HEALTH CARE EDUCATION/TRAINING PROGRAM

## 2022-01-01 PROCEDURE — 250N000009 HC RX 250: Performed by: PHYSICIAN ASSISTANT

## 2022-01-01 PROCEDURE — 99207 PR APP CREDIT; MD BILLING SHARED VISIT: CPT | Performed by: PHYSICIAN ASSISTANT

## 2022-01-01 PROCEDURE — 250N000011 HC RX IP 250 OP 636: Performed by: PHYSICIAN ASSISTANT

## 2022-01-01 PROCEDURE — 84484 ASSAY OF TROPONIN QUANT: CPT | Performed by: STUDENT IN AN ORGANIZED HEALTH CARE EDUCATION/TRAINING PROGRAM

## 2022-01-01 PROCEDURE — 250N000013 HC RX MED GY IP 250 OP 250 PS 637: Performed by: EMERGENCY MEDICINE

## 2022-01-01 PROCEDURE — 94660 CPAP INITIATION&MGMT: CPT

## 2022-01-01 PROCEDURE — 250N000011 HC RX IP 250 OP 636: Performed by: NURSE PRACTITIONER

## 2022-01-01 PROCEDURE — 87040 BLOOD CULTURE FOR BACTERIA: CPT | Performed by: STUDENT IN AN ORGANIZED HEALTH CARE EDUCATION/TRAINING PROGRAM

## 2022-01-01 PROCEDURE — 82947 ASSAY GLUCOSE BLOOD QUANT: CPT

## 2022-01-01 PROCEDURE — 93990 DOPPLER FLOW TESTING: CPT

## 2022-01-01 PROCEDURE — G0257 UNSCHED DIALYSIS ESRD PT HOS: HCPCS

## 2022-01-01 PROCEDURE — 120N000002 HC R&B MED SURG/OB UMMC

## 2022-01-01 PROCEDURE — 93010 ELECTROCARDIOGRAM REPORT: CPT | Performed by: INTERNAL MEDICINE

## 2022-01-01 PROCEDURE — 999N000248 HC STATISTIC IV INSERT WITH US BY RN

## 2022-01-01 PROCEDURE — 120N000003 HC R&B IMCU UMMC

## 2022-01-01 PROCEDURE — 99232 SBSQ HOSP IP/OBS MODERATE 35: CPT | Performed by: PHYSICIAN ASSISTANT

## 2022-01-01 PROCEDURE — 83550 IRON BINDING TEST: CPT

## 2022-01-01 PROCEDURE — 85027 COMPLETE CBC AUTOMATED: CPT | Performed by: HOSPITALIST

## 2022-01-01 PROCEDURE — 70486 CT MAXILLOFACIAL W/O DYE: CPT

## 2022-01-01 PROCEDURE — 92526 ORAL FUNCTION THERAPY: CPT | Mod: GN

## 2022-01-01 PROCEDURE — P9047 ALBUMIN (HUMAN), 25%, 50ML: HCPCS | Performed by: PHYSICIAN ASSISTANT

## 2022-01-01 PROCEDURE — 999N000127 HC STATISTIC PERIPHERAL IV START W US GUIDANCE

## 2022-01-01 PROCEDURE — 85027 COMPLETE CBC AUTOMATED: CPT | Performed by: STUDENT IN AN ORGANIZED HEALTH CARE EDUCATION/TRAINING PROGRAM

## 2022-01-01 PROCEDURE — 250N000013 HC RX MED GY IP 250 OP 250 PS 637: Performed by: PHYSICIAN ASSISTANT

## 2022-01-01 PROCEDURE — 84155 ASSAY OF PROTEIN SERUM: CPT | Performed by: EMERGENCY MEDICINE

## 2022-01-01 PROCEDURE — 83605 ASSAY OF LACTIC ACID: CPT | Performed by: EMERGENCY MEDICINE

## 2022-01-01 PROCEDURE — 93005 ELECTROCARDIOGRAM TRACING: CPT

## 2022-01-01 PROCEDURE — 82330 ASSAY OF CALCIUM: CPT

## 2022-01-01 PROCEDURE — 84484 ASSAY OF TROPONIN QUANT: CPT | Performed by: HOSPITALIST

## 2022-01-01 PROCEDURE — 99238 HOSP IP/OBS DSCHRG MGMT 30/<: CPT | Performed by: INTERNAL MEDICINE

## 2022-01-01 PROCEDURE — 96365 THER/PROPH/DIAG IV INF INIT: CPT | Performed by: STUDENT IN AN ORGANIZED HEALTH CARE EDUCATION/TRAINING PROGRAM

## 2022-01-01 PROCEDURE — 99233 SBSQ HOSP IP/OBS HIGH 50: CPT | Mod: GC | Performed by: STUDENT IN AN ORGANIZED HEALTH CARE EDUCATION/TRAINING PROGRAM

## 2022-01-01 PROCEDURE — 82803 BLOOD GASES ANY COMBINATION: CPT | Performed by: STUDENT IN AN ORGANIZED HEALTH CARE EDUCATION/TRAINING PROGRAM

## 2022-01-01 PROCEDURE — 80051 ELECTROLYTE PANEL: CPT | Performed by: INTERNAL MEDICINE

## 2022-01-01 PROCEDURE — 82805 BLOOD GASES W/O2 SATURATION: CPT | Performed by: STUDENT IN AN ORGANIZED HEALTH CARE EDUCATION/TRAINING PROGRAM

## 2022-01-01 PROCEDURE — 250N000011 HC RX IP 250 OP 636

## 2022-01-01 PROCEDURE — 99283 EMERGENCY DEPT VISIT LOW MDM: CPT | Mod: 25 | Performed by: STUDENT IN AN ORGANIZED HEALTH CARE EDUCATION/TRAINING PROGRAM

## 2022-01-01 PROCEDURE — 84100 ASSAY OF PHOSPHORUS: CPT | Performed by: HOSPITALIST

## 2022-01-01 PROCEDURE — 84484 ASSAY OF TROPONIN QUANT: CPT | Performed by: PHYSICIAN ASSISTANT

## 2022-01-01 PROCEDURE — 82803 BLOOD GASES ANY COMBINATION: CPT | Performed by: EMERGENCY MEDICINE

## 2022-01-01 PROCEDURE — 84439 ASSAY OF FREE THYROXINE: CPT | Performed by: HOSPITALIST

## 2022-01-01 PROCEDURE — 85027 COMPLETE CBC AUTOMATED: CPT | Performed by: PHYSICIAN ASSISTANT

## 2022-01-01 PROCEDURE — P9604 ONE-WAY ALLOW PRORATED TRIP: HCPCS | Mod: ORL | Performed by: INTERNAL MEDICINE

## 2022-01-01 PROCEDURE — 84439 ASSAY OF FREE THYROXINE: CPT | Performed by: STUDENT IN AN ORGANIZED HEALTH CARE EDUCATION/TRAINING PROGRAM

## 2022-01-01 PROCEDURE — 82310 ASSAY OF CALCIUM: CPT | Performed by: EMERGENCY MEDICINE

## 2022-01-01 PROCEDURE — 82330 ASSAY OF CALCIUM: CPT | Performed by: PHYSICIAN ASSISTANT

## 2022-01-01 PROCEDURE — 93005 ELECTROCARDIOGRAM TRACING: CPT | Performed by: STUDENT IN AN ORGANIZED HEALTH CARE EDUCATION/TRAINING PROGRAM

## 2022-01-01 PROCEDURE — 99232 SBSQ HOSP IP/OBS MODERATE 35: CPT | Performed by: HOSPITALIST

## 2022-01-01 PROCEDURE — 999N000128 HC STATISTIC PERIPHERAL IV START W/O US GUIDANCE

## 2022-01-01 PROCEDURE — 36415 COLL VENOUS BLD VENIPUNCTURE: CPT | Performed by: EMERGENCY MEDICINE

## 2022-01-01 PROCEDURE — 90935 HEMODIALYSIS ONE EVALUATION: CPT | Mod: GC | Performed by: INTERNAL MEDICINE

## 2022-01-01 PROCEDURE — 85520 HEPARIN ASSAY: CPT | Performed by: STUDENT IN AN ORGANIZED HEALTH CARE EDUCATION/TRAINING PROGRAM

## 2022-01-01 PROCEDURE — 84145 PROCALCITONIN (PCT): CPT | Performed by: EMERGENCY MEDICINE

## 2022-01-01 PROCEDURE — 99310 SBSQ NF CARE HIGH MDM 45: CPT | Performed by: NURSE PRACTITIONER

## 2022-01-01 PROCEDURE — 96372 THER/PROPH/DIAG INJ SC/IM: CPT | Performed by: PHYSICIAN ASSISTANT

## 2022-01-01 PROCEDURE — 99221 1ST HOSP IP/OBS SF/LOW 40: CPT | Mod: AI | Performed by: STUDENT IN AN ORGANIZED HEALTH CARE EDUCATION/TRAINING PROGRAM

## 2022-01-01 PROCEDURE — 71046 X-RAY EXAM CHEST 2 VIEWS: CPT | Mod: 26 | Performed by: RADIOLOGY

## 2022-01-01 PROCEDURE — 84100 ASSAY OF PHOSPHORUS: CPT | Performed by: INTERNAL MEDICINE

## 2022-01-01 PROCEDURE — 258N000003 HC RX IP 258 OP 636: Performed by: HOSPITALIST

## 2022-01-01 PROCEDURE — 634N000001 HC RX 634: Performed by: HOSPITALIST

## 2022-01-01 PROCEDURE — 71045 X-RAY EXAM CHEST 1 VIEW: CPT

## 2022-01-01 PROCEDURE — 82310 ASSAY OF CALCIUM: CPT | Performed by: STUDENT IN AN ORGANIZED HEALTH CARE EDUCATION/TRAINING PROGRAM

## 2022-01-01 PROCEDURE — 93010 ELECTROCARDIOGRAM REPORT: CPT | Performed by: STUDENT IN AN ORGANIZED HEALTH CARE EDUCATION/TRAINING PROGRAM

## 2022-01-01 PROCEDURE — U0005 INFEC AGEN DETEC AMPLI PROBE: HCPCS | Performed by: STUDENT IN AN ORGANIZED HEALTH CARE EDUCATION/TRAINING PROGRAM

## 2022-01-01 PROCEDURE — 258N000003 HC RX IP 258 OP 636

## 2022-01-01 PROCEDURE — P9041 ALBUMIN (HUMAN),5%, 50ML: HCPCS | Performed by: STUDENT IN AN ORGANIZED HEALTH CARE EDUCATION/TRAINING PROGRAM

## 2022-01-01 PROCEDURE — 84100 ASSAY OF PHOSPHORUS: CPT | Performed by: PHYSICIAN ASSISTANT

## 2022-01-01 PROCEDURE — 85610 PROTHROMBIN TIME: CPT | Performed by: HOSPITALIST

## 2022-01-01 PROCEDURE — 70486 CT MAXILLOFACIAL W/O DYE: CPT | Mod: 26 | Performed by: RADIOLOGY

## 2022-01-01 PROCEDURE — 71045 X-RAY EXAM CHEST 1 VIEW: CPT | Mod: 77

## 2022-01-01 PROCEDURE — 92610 EVALUATE SWALLOWING FUNCTION: CPT | Mod: GN

## 2022-01-01 PROCEDURE — 93308 TTE F-UP OR LMTD: CPT

## 2022-01-01 PROCEDURE — 80051 ELECTROLYTE PANEL: CPT | Performed by: HOSPITALIST

## 2022-01-01 PROCEDURE — P9047 ALBUMIN (HUMAN), 25%, 50ML: HCPCS | Performed by: HOSPITALIST

## 2022-01-01 PROCEDURE — 93308 TTE F-UP OR LMTD: CPT | Mod: 26 | Performed by: EMERGENCY MEDICINE

## 2022-01-01 PROCEDURE — 80053 COMPREHEN METABOLIC PANEL: CPT | Performed by: STUDENT IN AN ORGANIZED HEALTH CARE EDUCATION/TRAINING PROGRAM

## 2022-01-01 PROCEDURE — 83735 ASSAY OF MAGNESIUM: CPT

## 2022-01-01 PROCEDURE — P9047 ALBUMIN (HUMAN), 25%, 50ML: HCPCS | Performed by: NURSE PRACTITIONER

## 2022-01-01 PROCEDURE — 90935 HEMODIALYSIS ONE EVALUATION: CPT | Performed by: INTERNAL MEDICINE

## 2022-01-01 PROCEDURE — 97530 THERAPEUTIC ACTIVITIES: CPT | Mod: GP

## 2022-01-01 PROCEDURE — 84443 ASSAY THYROID STIM HORMONE: CPT | Performed by: HOSPITALIST

## 2022-01-01 PROCEDURE — 82310 ASSAY OF CALCIUM: CPT | Performed by: HOSPITALIST

## 2022-01-01 PROCEDURE — C9803 HOPD COVID-19 SPEC COLLECT: HCPCS

## 2022-01-01 PROCEDURE — 96375 TX/PRO/DX INJ NEW DRUG ADDON: CPT

## 2022-01-01 PROCEDURE — 250N000011 HC RX IP 250 OP 636: Performed by: INTERNAL MEDICINE

## 2022-01-01 PROCEDURE — 999N000157 HC STATISTIC RCP TIME EA 10 MIN: Mod: 76

## 2022-01-01 PROCEDURE — 85004 AUTOMATED DIFF WBC COUNT: CPT | Performed by: STUDENT IN AN ORGANIZED HEALTH CARE EDUCATION/TRAINING PROGRAM

## 2022-01-01 PROCEDURE — P9047 ALBUMIN (HUMAN), 25%, 50ML: HCPCS | Mod: JG | Performed by: CLINICAL NURSE SPECIALIST

## 2022-01-01 PROCEDURE — 70450 CT HEAD/BRAIN W/O DYE: CPT

## 2022-01-01 PROCEDURE — 85018 HEMOGLOBIN: CPT | Performed by: STUDENT IN AN ORGANIZED HEALTH CARE EDUCATION/TRAINING PROGRAM

## 2022-01-01 PROCEDURE — 99233 SBSQ HOSP IP/OBS HIGH 50: CPT | Performed by: STUDENT IN AN ORGANIZED HEALTH CARE EDUCATION/TRAINING PROGRAM

## 2022-01-01 PROCEDURE — 250N000011 HC RX IP 250 OP 636: Performed by: HOSPITALIST

## 2022-01-01 PROCEDURE — 74177 CT ABD & PELVIS W/CONTRAST: CPT

## 2022-01-01 PROCEDURE — 87641 MR-STAPH DNA AMP PROBE: CPT | Performed by: STUDENT IN AN ORGANIZED HEALTH CARE EDUCATION/TRAINING PROGRAM

## 2022-01-01 PROCEDURE — 250N000011 HC RX IP 250 OP 636: Performed by: EMERGENCY MEDICINE

## 2022-01-01 PROCEDURE — 85025 COMPLETE CBC W/AUTO DIFF WBC: CPT | Performed by: EMERGENCY MEDICINE

## 2022-01-01 PROCEDURE — 80048 BASIC METABOLIC PNL TOTAL CA: CPT | Performed by: INTERNAL MEDICINE

## 2022-01-01 PROCEDURE — 71045 X-RAY EXAM CHEST 1 VIEW: CPT | Mod: 26 | Performed by: RADIOLOGY

## 2022-01-01 PROCEDURE — 250N000009 HC RX 250: Performed by: CLINICAL NURSE SPECIALIST

## 2022-01-01 PROCEDURE — 85027 COMPLETE CBC AUTOMATED: CPT | Performed by: EMERGENCY MEDICINE

## 2022-01-01 PROCEDURE — 258N000003 HC RX IP 258 OP 636: Performed by: CLINICAL NURSE SPECIALIST

## 2022-01-01 PROCEDURE — G0463 HOSPITAL OUTPT CLINIC VISIT: HCPCS

## 2022-01-01 PROCEDURE — 97530 THERAPEUTIC ACTIVITIES: CPT | Mod: GP | Performed by: PHYSICAL THERAPIST

## 2022-01-01 PROCEDURE — 83735 ASSAY OF MAGNESIUM: CPT | Performed by: STUDENT IN AN ORGANIZED HEALTH CARE EDUCATION/TRAINING PROGRAM

## 2022-01-01 PROCEDURE — 82803 BLOOD GASES ANY COMBINATION: CPT

## 2022-01-01 PROCEDURE — 87077 CULTURE AEROBIC IDENTIFY: CPT | Performed by: STUDENT IN AN ORGANIZED HEALTH CARE EDUCATION/TRAINING PROGRAM

## 2022-01-01 PROCEDURE — 84466 ASSAY OF TRANSFERRIN: CPT

## 2022-01-01 PROCEDURE — 80048 BASIC METABOLIC PNL TOTAL CA: CPT | Performed by: PHYSICIAN ASSISTANT

## 2022-01-01 PROCEDURE — 5A1D70Z PERFORMANCE OF URINARY FILTRATION, INTERMITTENT, LESS THAN 6 HOURS PER DAY: ICD-10-PCS | Performed by: INTERNAL MEDICINE

## 2022-01-01 PROCEDURE — 76604 US EXAM CHEST: CPT | Mod: 26 | Performed by: STUDENT IN AN ORGANIZED HEALTH CARE EDUCATION/TRAINING PROGRAM

## 2022-01-01 PROCEDURE — 85014 HEMATOCRIT: CPT | Performed by: HOSPITALIST

## 2022-01-01 PROCEDURE — 85379 FIBRIN DEGRADATION QUANT: CPT | Performed by: EMERGENCY MEDICINE

## 2022-01-01 PROCEDURE — 99222 1ST HOSP IP/OBS MODERATE 55: CPT | Mod: FS | Performed by: CLINICAL NURSE SPECIALIST

## 2022-01-01 PROCEDURE — 76604 US EXAM CHEST: CPT | Performed by: STUDENT IN AN ORGANIZED HEALTH CARE EDUCATION/TRAINING PROGRAM

## 2022-01-01 PROCEDURE — 84443 ASSAY THYROID STIM HORMONE: CPT | Performed by: STUDENT IN AN ORGANIZED HEALTH CARE EDUCATION/TRAINING PROGRAM

## 2022-01-01 PROCEDURE — 86481 TB AG RESPONSE T-CELL SUSP: CPT | Mod: ORL | Performed by: INTERNAL MEDICINE

## 2022-01-01 PROCEDURE — 99291 CRITICAL CARE FIRST HOUR: CPT | Mod: 25 | Performed by: EMERGENCY MEDICINE

## 2022-01-01 PROCEDURE — 80048 BASIC METABOLIC PNL TOTAL CA: CPT

## 2022-01-01 PROCEDURE — 36415 COLL VENOUS BLD VENIPUNCTURE: CPT | Performed by: INTERNAL MEDICINE

## 2022-01-01 PROCEDURE — 83880 ASSAY OF NATRIURETIC PEPTIDE: CPT | Performed by: EMERGENCY MEDICINE

## 2022-01-01 PROCEDURE — 258N000003 HC RX IP 258 OP 636: Performed by: INTERNAL MEDICINE

## 2022-01-01 PROCEDURE — 99222 1ST HOSP IP/OBS MODERATE 55: CPT | Mod: GC | Performed by: INTERNAL MEDICINE

## 2022-01-01 PROCEDURE — 99217 PR OBSERVATION CARE DISCHARGE: CPT | Performed by: HOSPITALIST

## 2022-01-01 PROCEDURE — 93308 TTE F-UP OR LMTD: CPT | Performed by: STUDENT IN AN ORGANIZED HEALTH CARE EDUCATION/TRAINING PROGRAM

## 2022-01-01 PROCEDURE — 86140 C-REACTIVE PROTEIN: CPT | Performed by: STUDENT IN AN ORGANIZED HEALTH CARE EDUCATION/TRAINING PROGRAM

## 2022-01-01 PROCEDURE — 84484 ASSAY OF TROPONIN QUANT: CPT

## 2022-01-01 PROCEDURE — 99285 EMERGENCY DEPT VISIT HI MDM: CPT | Mod: 25

## 2022-01-01 PROCEDURE — 250N000011 HC RX IP 250 OP 636: Performed by: CLINICAL NURSE SPECIALIST

## 2022-01-01 PROCEDURE — 87149 DNA/RNA DIRECT PROBE: CPT | Performed by: STUDENT IN AN ORGANIZED HEALTH CARE EDUCATION/TRAINING PROGRAM

## 2022-01-01 PROCEDURE — 71275 CT ANGIOGRAPHY CHEST: CPT

## 2022-01-01 PROCEDURE — 97161 PT EVAL LOW COMPLEX 20 MIN: CPT | Mod: GP | Performed by: PHYSICAL THERAPIST

## 2022-01-01 PROCEDURE — 84484 ASSAY OF TROPONIN QUANT: CPT | Performed by: EMERGENCY MEDICINE

## 2022-01-01 PROCEDURE — 999N000202 HC STATISTICAL VASC ACCESS NURSE TIME, 1-15 MINUTES

## 2022-01-01 PROCEDURE — 70450 CT HEAD/BRAIN W/O DYE: CPT | Mod: 26 | Performed by: RADIOLOGY

## 2022-01-01 PROCEDURE — 250N000009 HC RX 250: Performed by: STUDENT IN AN ORGANIZED HEALTH CARE EDUCATION/TRAINING PROGRAM

## 2022-01-01 PROCEDURE — 82728 ASSAY OF FERRITIN: CPT

## 2022-01-01 PROCEDURE — 85025 COMPLETE CBC W/AUTO DIFF WBC: CPT | Performed by: STUDENT IN AN ORGANIZED HEALTH CARE EDUCATION/TRAINING PROGRAM

## 2022-01-01 PROCEDURE — 82374 ASSAY BLOOD CARBON DIOXIDE: CPT | Performed by: STUDENT IN AN ORGANIZED HEALTH CARE EDUCATION/TRAINING PROGRAM

## 2022-01-01 PROCEDURE — C9803 HOPD COVID-19 SPEC COLLECT: HCPCS | Performed by: STUDENT IN AN ORGANIZED HEALTH CARE EDUCATION/TRAINING PROGRAM

## 2022-01-01 PROCEDURE — 250N000009 HC RX 250: Performed by: EMERGENCY MEDICINE

## 2022-01-01 PROCEDURE — 82435 ASSAY OF BLOOD CHLORIDE: CPT | Performed by: STUDENT IN AN ORGANIZED HEALTH CARE EDUCATION/TRAINING PROGRAM

## 2022-01-01 PROCEDURE — 634N000001 HC RX 634: Performed by: STUDENT IN AN ORGANIZED HEALTH CARE EDUCATION/TRAINING PROGRAM

## 2022-01-01 PROCEDURE — 99233 SBSQ HOSP IP/OBS HIGH 50: CPT | Mod: FS | Performed by: INTERNAL MEDICINE

## 2022-01-01 PROCEDURE — 74177 CT ABD & PELVIS W/CONTRAST: CPT | Mod: 26 | Performed by: RADIOLOGY

## 2022-01-01 PROCEDURE — 36600 WITHDRAWAL OF ARTERIAL BLOOD: CPT

## 2022-01-01 PROCEDURE — 99226 PR SUBSEQUENT OBSERVATION CARE,LEVEL III: CPT | Performed by: HOSPITALIST

## 2022-01-01 PROCEDURE — 36415 COLL VENOUS BLD VENIPUNCTURE: CPT

## 2022-01-01 PROCEDURE — 83605 ASSAY OF LACTIC ACID: CPT

## 2022-01-01 PROCEDURE — 5A09457 ASSISTANCE WITH RESPIRATORY VENTILATION, 24-96 CONSECUTIVE HOURS, CONTINUOUS POSITIVE AIRWAY PRESSURE: ICD-10-PCS | Performed by: STUDENT IN AN ORGANIZED HEALTH CARE EDUCATION/TRAINING PROGRAM

## 2022-01-01 PROCEDURE — P9045 ALBUMIN (HUMAN), 5%, 250 ML: HCPCS | Performed by: CLINICAL NURSE SPECIALIST

## 2022-01-01 PROCEDURE — 99232 SBSQ HOSP IP/OBS MODERATE 35: CPT | Mod: FS | Performed by: CLINICAL NURSE SPECIALIST

## 2022-01-01 PROCEDURE — 93306 TTE W/DOPPLER COMPLETE: CPT

## 2022-01-01 PROCEDURE — 99285 EMERGENCY DEPT VISIT HI MDM: CPT | Mod: 25 | Performed by: STUDENT IN AN ORGANIZED HEALTH CARE EDUCATION/TRAINING PROGRAM

## 2022-01-01 PROCEDURE — 99223 1ST HOSP IP/OBS HIGH 75: CPT | Performed by: STUDENT IN AN ORGANIZED HEALTH CARE EDUCATION/TRAINING PROGRAM

## 2022-01-01 PROCEDURE — 93308 TTE F-UP OR LMTD: CPT | Mod: 26 | Performed by: STUDENT IN AN ORGANIZED HEALTH CARE EDUCATION/TRAINING PROGRAM

## 2022-01-01 PROCEDURE — 999N000032 HC STATISTIC CHRONIC DISEASE SPECIALIST RT CONSULT

## 2022-01-01 PROCEDURE — 86706 HEP B SURFACE ANTIBODY: CPT | Performed by: STUDENT IN AN ORGANIZED HEALTH CARE EDUCATION/TRAINING PROGRAM

## 2022-01-01 PROCEDURE — 71046 X-RAY EXAM CHEST 2 VIEWS: CPT

## 2022-01-01 PROCEDURE — 96374 THER/PROPH/DIAG INJ IV PUSH: CPT

## 2022-01-01 PROCEDURE — 634N000001 HC RX 634: Mod: EC | Performed by: CLINICAL NURSE SPECIALIST

## 2022-01-01 PROCEDURE — 96366 THER/PROPH/DIAG IV INF ADDON: CPT | Performed by: STUDENT IN AN ORGANIZED HEALTH CARE EDUCATION/TRAINING PROGRAM

## 2022-01-01 PROCEDURE — 82947 ASSAY GLUCOSE BLOOD QUANT: CPT | Performed by: EMERGENCY MEDICINE

## 2022-01-01 PROCEDURE — 93010 ELECTROCARDIOGRAM REPORT: CPT | Mod: 59 | Performed by: EMERGENCY MEDICINE

## 2022-01-01 PROCEDURE — 36415 COLL VENOUS BLD VENIPUNCTURE: CPT | Mod: ORL | Performed by: INTERNAL MEDICINE

## 2022-01-01 PROCEDURE — 93990 DOPPLER FLOW TESTING: CPT | Mod: 26 | Performed by: RADIOLOGY

## 2022-01-01 RX ORDER — ACETAMINOPHEN 500 MG
1000 TABLET ORAL EVERY EVENING
Status: ON HOLD | COMMUNITY
End: 2022-01-01

## 2022-01-01 RX ORDER — BUDESONIDE 0.5 MG/2ML
0.5 INHALANT ORAL 2 TIMES DAILY
Qty: 40 ML | Refills: 0 | Status: SHIPPED | OUTPATIENT
Start: 2022-01-01 | End: 2022-08-21

## 2022-01-01 RX ORDER — CEFTRIAXONE 2 G/1
2 INJECTION, POWDER, FOR SOLUTION INTRAMUSCULAR; INTRAVENOUS
Qty: 40 ML | Refills: 0 | OUTPATIENT
Start: 2022-01-01 | End: 2022-01-01

## 2022-01-01 RX ORDER — ATORVASTATIN CALCIUM 40 MG/1
40 TABLET, FILM COATED ORAL AT BEDTIME
Status: DISCONTINUED | OUTPATIENT
Start: 2022-01-01 | End: 2022-01-01 | Stop reason: HOSPADM

## 2022-01-01 RX ORDER — ACETAMINOPHEN 500 MG
1000 TABLET ORAL 2 TIMES DAILY PRN
Status: ON HOLD | COMMUNITY
End: 2022-01-01

## 2022-01-01 RX ORDER — ALBUMIN (HUMAN) 12.5 G/50ML
25 SOLUTION INTRAVENOUS
Status: COMPLETED | OUTPATIENT
Start: 2022-01-01 | End: 2022-01-01

## 2022-01-01 RX ORDER — METHYLPREDNISOLONE SODIUM SUCCINATE 125 MG/2ML
125 INJECTION, POWDER, LYOPHILIZED, FOR SOLUTION INTRAMUSCULAR; INTRAVENOUS ONCE
Status: COMPLETED | OUTPATIENT
Start: 2022-01-01 | End: 2022-01-01

## 2022-01-01 RX ORDER — ALBUMIN, HUMAN INJ 5% 5 %
25 SOLUTION INTRAVENOUS ONCE
Status: COMPLETED | OUTPATIENT
Start: 2022-01-01 | End: 2022-01-01

## 2022-01-01 RX ORDER — MIDODRINE HYDROCHLORIDE 5 MG/1
10 TABLET ORAL
Status: DISCONTINUED | OUTPATIENT
Start: 2022-01-01 | End: 2022-01-01

## 2022-01-01 RX ORDER — AMOXICILLIN 250 MG
2 CAPSULE ORAL AT BEDTIME
Status: CANCELLED | OUTPATIENT
Start: 2022-01-01

## 2022-01-01 RX ORDER — SENNOSIDES 8.6 MG
8.6 TABLET ORAL 2 TIMES DAILY PRN
Status: DISCONTINUED | OUTPATIENT
Start: 2022-01-01 | End: 2022-01-01 | Stop reason: HOSPADM

## 2022-01-01 RX ORDER — LANOLIN ALCOHOL/MO/W.PET/CERES
3 CREAM (GRAM) TOPICAL AT BEDTIME
Status: DISCONTINUED | OUTPATIENT
Start: 2022-01-01 | End: 2022-01-01 | Stop reason: HOSPADM

## 2022-01-01 RX ORDER — LORAZEPAM 0.5 MG/1
0.5 TABLET ORAL ONCE
Status: COMPLETED | OUTPATIENT
Start: 2022-01-01 | End: 2022-01-01

## 2022-01-01 RX ORDER — LEVOTHYROXINE SODIUM 125 UG/1
250 TABLET ORAL DAILY
Qty: 60 TABLET | Refills: 0 | Status: SHIPPED | OUTPATIENT
Start: 2022-01-01 | End: 2022-09-11

## 2022-01-01 RX ORDER — METHYLPREDNISOLONE SODIUM SUCCINATE 40 MG/ML
40 INJECTION, POWDER, LYOPHILIZED, FOR SOLUTION INTRAMUSCULAR; INTRAVENOUS EVERY 4 HOURS
Status: COMPLETED | OUTPATIENT
Start: 2022-01-01 | End: 2022-01-01

## 2022-01-01 RX ORDER — AMPICILLIN AND SULBACTAM 2; 1 G/1; G/1
3 INJECTION, POWDER, FOR SOLUTION INTRAMUSCULAR; INTRAVENOUS EVERY 24 HOURS
Status: DISCONTINUED | OUTPATIENT
Start: 2022-01-01 | End: 2022-01-01

## 2022-01-01 RX ORDER — PIPERACILLIN SODIUM, TAZOBACTAM SODIUM 2; .25 G/10ML; G/10ML
2.25 INJECTION, POWDER, LYOPHILIZED, FOR SOLUTION INTRAVENOUS EVERY 6 HOURS
Status: DISCONTINUED | OUTPATIENT
Start: 2022-01-01 | End: 2022-01-01

## 2022-01-01 RX ORDER — IOPAMIDOL 755 MG/ML
90 INJECTION, SOLUTION INTRAVASCULAR ONCE
Status: COMPLETED | OUTPATIENT
Start: 2022-01-01 | End: 2022-01-01

## 2022-01-01 RX ORDER — FORMOTEROL FUMARATE DIHYDRATE 20 UG/2ML
20 SOLUTION RESPIRATORY (INHALATION) EVERY 12 HOURS
Status: CANCELLED | OUTPATIENT
Start: 2022-01-01

## 2022-01-01 RX ORDER — BISACODYL 10 MG
10 SUPPOSITORY, RECTAL RECTAL DAILY PRN
Status: DISCONTINUED | OUTPATIENT
Start: 2022-01-01 | End: 2022-01-01 | Stop reason: HOSPADM

## 2022-01-01 RX ORDER — IPRATROPIUM BROMIDE AND ALBUTEROL SULFATE 2.5; .5 MG/3ML; MG/3ML
1 SOLUTION RESPIRATORY (INHALATION) EVERY 6 HOURS PRN
Status: DISCONTINUED | OUTPATIENT
Start: 2022-01-01 | End: 2022-01-01 | Stop reason: HOSPADM

## 2022-01-01 RX ORDER — AMOXICILLIN 250 MG
1 CAPSULE ORAL 2 TIMES DAILY PRN
Status: DISCONTINUED | OUTPATIENT
Start: 2022-01-01 | End: 2022-01-01 | Stop reason: HOSPADM

## 2022-01-01 RX ORDER — METHYLPREDNISOLONE SODIUM SUCCINATE 40 MG/ML
40 INJECTION, POWDER, LYOPHILIZED, FOR SOLUTION INTRAMUSCULAR; INTRAVENOUS ONCE
Status: COMPLETED | OUTPATIENT
Start: 2022-01-01 | End: 2022-01-01

## 2022-01-01 RX ORDER — IPRATROPIUM BROMIDE AND ALBUTEROL SULFATE 2.5; .5 MG/3ML; MG/3ML
3 SOLUTION RESPIRATORY (INHALATION) ONCE
Status: COMPLETED | OUTPATIENT
Start: 2022-01-01 | End: 2022-01-01

## 2022-01-01 RX ORDER — MIDODRINE HYDROCHLORIDE 10 MG/1
10 TABLET ORAL
Qty: 30 TABLET | Refills: 0 | Status: SHIPPED | OUTPATIENT
Start: 2022-01-01 | End: 2022-10-14

## 2022-01-01 RX ORDER — ACETYLCYSTEINE 100 MG/ML
4 SOLUTION ORAL; RESPIRATORY (INHALATION) EVERY 4 HOURS
Status: DISCONTINUED | OUTPATIENT
Start: 2022-01-01 | End: 2022-01-01

## 2022-01-01 RX ORDER — CEFTRIAXONE 2 G/1
2 INJECTION, POWDER, FOR SOLUTION INTRAMUSCULAR; INTRAVENOUS EVERY 24 HOURS
Status: DISCONTINUED | OUTPATIENT
Start: 2022-01-01 | End: 2022-01-01 | Stop reason: HOSPADM

## 2022-01-01 RX ORDER — ALBUMIN (HUMAN) 12.5 G/50ML
50 SOLUTION INTRAVENOUS
Status: DISCONTINUED | OUTPATIENT
Start: 2022-01-01 | End: 2022-01-01

## 2022-01-01 RX ORDER — GABAPENTIN 100 MG/1
100 CAPSULE ORAL DAILY
Status: ON HOLD | COMMUNITY
End: 2022-01-01

## 2022-01-01 RX ORDER — BUDESONIDE 0.5 MG/2ML
0.5 INHALANT ORAL 2 TIMES DAILY
Status: DISCONTINUED | OUTPATIENT
Start: 2022-01-01 | End: 2022-01-01 | Stop reason: HOSPADM

## 2022-01-01 RX ORDER — FORMOTEROL FUMARATE DIHYDRATE 20 UG/2ML
20 SOLUTION RESPIRATORY (INHALATION) EVERY 12 HOURS
Status: ON HOLD
Start: 2022-01-01 | End: 2022-01-01

## 2022-01-01 RX ORDER — LEVOTHYROXINE SODIUM 50 UG/1
50 TABLET ORAL ONCE
Status: COMPLETED | OUTPATIENT
Start: 2022-01-01 | End: 2022-01-01

## 2022-01-01 RX ORDER — PANTOPRAZOLE SODIUM 40 MG/1
40 TABLET, DELAYED RELEASE ORAL 2 TIMES DAILY
Status: DISCONTINUED | OUTPATIENT
Start: 2022-01-01 | End: 2022-01-01 | Stop reason: HOSPADM

## 2022-01-01 RX ORDER — CARBOXYMETHYLCELLULOSE SODIUM 5 MG/ML
1 SOLUTION/ DROPS OPHTHALMIC
Status: DISCONTINUED | OUTPATIENT
Start: 2022-01-01 | End: 2022-01-01 | Stop reason: HOSPADM

## 2022-01-01 RX ORDER — HEPARIN SODIUM 5000 [USP'U]/.5ML
5000 INJECTION, SOLUTION INTRAVENOUS; SUBCUTANEOUS EVERY 12 HOURS
Status: DISCONTINUED | OUTPATIENT
Start: 2022-01-01 | End: 2022-01-01 | Stop reason: HOSPADM

## 2022-01-01 RX ORDER — HEPARIN SODIUM 1000 [USP'U]/ML
500 INJECTION, SOLUTION INTRAVENOUS; SUBCUTANEOUS CONTINUOUS
Status: DISCONTINUED | OUTPATIENT
Start: 2022-01-01 | End: 2022-01-01

## 2022-01-01 RX ORDER — HEPARIN SODIUM 10000 [USP'U]/100ML
0-5000 INJECTION, SOLUTION INTRAVENOUS CONTINUOUS
Status: DISCONTINUED | OUTPATIENT
Start: 2022-01-01 | End: 2022-01-01

## 2022-01-01 RX ORDER — FUROSEMIDE 10 MG/ML
100 INJECTION INTRAMUSCULAR; INTRAVENOUS ONCE
Status: COMPLETED | OUTPATIENT
Start: 2022-01-01 | End: 2022-01-01

## 2022-01-01 RX ORDER — IPRATROPIUM BROMIDE AND ALBUTEROL SULFATE 2.5; .5 MG/3ML; MG/3ML
1 SOLUTION RESPIRATORY (INHALATION) EVERY EVENING
Status: ON HOLD | COMMUNITY
End: 2022-01-01

## 2022-01-01 RX ORDER — MIDODRINE HYDROCHLORIDE 5 MG/1
10 TABLET ORAL DAILY PRN
Status: DISCONTINUED | OUTPATIENT
Start: 2022-01-01 | End: 2022-01-01 | Stop reason: HOSPADM

## 2022-01-01 RX ORDER — ASPIRIN 81 MG/1
81 TABLET, CHEWABLE ORAL AT BEDTIME
Status: DISCONTINUED | OUTPATIENT
Start: 2022-01-01 | End: 2022-01-01 | Stop reason: HOSPADM

## 2022-01-01 RX ORDER — ALBUTEROL SULFATE 90 UG/1
2 AEROSOL, METERED RESPIRATORY (INHALATION) EVERY 6 HOURS PRN
Status: DISCONTINUED | OUTPATIENT
Start: 2022-01-01 | End: 2022-01-01

## 2022-01-01 RX ORDER — HEPARIN SODIUM 5000 [USP'U]/.5ML
5000 INJECTION, SOLUTION INTRAVENOUS; SUBCUTANEOUS EVERY 12 HOURS
Status: DISCONTINUED | OUTPATIENT
Start: 2022-01-01 | End: 2022-01-01

## 2022-01-01 RX ORDER — SENNOSIDES 8.6 MG
2 TABLET ORAL ONCE
Status: DISCONTINUED | OUTPATIENT
Start: 2022-01-01 | End: 2022-01-01 | Stop reason: HOSPADM

## 2022-01-01 RX ORDER — BISACODYL 10 MG
10 SUPPOSITORY, RECTAL RECTAL ONCE
Status: COMPLETED | OUTPATIENT
Start: 2022-01-01 | End: 2022-01-01

## 2022-01-01 RX ORDER — VITAMIN B COMPLEX
50 TABLET ORAL DAILY
Status: DISCONTINUED | OUTPATIENT
Start: 2022-01-01 | End: 2022-01-01 | Stop reason: HOSPADM

## 2022-01-01 RX ORDER — QUETIAPINE FUMARATE 25 MG/1
25 TABLET, FILM COATED ORAL AT BEDTIME
Status: DISCONTINUED | OUTPATIENT
Start: 2022-01-01 | End: 2022-01-01

## 2022-01-01 RX ORDER — MIDODRINE HYDROCHLORIDE 5 MG/1
10 TABLET ORAL
Status: DISCONTINUED | OUTPATIENT
Start: 2022-01-01 | End: 2022-01-01 | Stop reason: HOSPADM

## 2022-01-01 RX ORDER — LIDOCAINE 40 MG/G
CREAM TOPICAL
Status: CANCELLED | OUTPATIENT
Start: 2022-01-01

## 2022-01-01 RX ORDER — ALBUTEROL SULFATE 0.83 MG/ML
2.5 SOLUTION RESPIRATORY (INHALATION) EVERY 4 HOURS PRN
Status: DISCONTINUED | OUTPATIENT
Start: 2022-01-01 | End: 2022-01-01 | Stop reason: HOSPADM

## 2022-01-01 RX ORDER — ONDANSETRON 2 MG/ML
4 INJECTION INTRAMUSCULAR; INTRAVENOUS EVERY 30 MIN PRN
Status: DISCONTINUED | OUTPATIENT
Start: 2022-01-01 | End: 2022-01-01 | Stop reason: HOSPADM

## 2022-01-01 RX ORDER — PANTOPRAZOLE SODIUM 40 MG/1
40 TABLET, DELAYED RELEASE ORAL 2 TIMES DAILY
Qty: 60 TABLET | Refills: 0 | Status: SHIPPED | OUTPATIENT
Start: 2022-01-01 | End: 2022-09-10

## 2022-01-01 RX ORDER — ALBUMIN (HUMAN) 12.5 G/50ML
12.5 SOLUTION INTRAVENOUS ONCE
Status: COMPLETED | OUTPATIENT
Start: 2022-01-01 | End: 2022-01-01

## 2022-01-01 RX ORDER — MORPHINE SULFATE 10 MG/5ML
1 SOLUTION ORAL
COMMUNITY

## 2022-01-01 RX ORDER — IPRATROPIUM BROMIDE AND ALBUTEROL SULFATE 2.5; .5 MG/3ML; MG/3ML
1 SOLUTION RESPIRATORY (INHALATION) EVERY 6 HOURS PRN
Status: CANCELLED | OUTPATIENT
Start: 2022-01-01

## 2022-01-01 RX ORDER — DOXERCALCIFEROL 4 UG/2ML
4 INJECTION INTRAVENOUS
Status: COMPLETED | OUTPATIENT
Start: 2022-01-01 | End: 2022-01-01

## 2022-01-01 RX ORDER — NICOTINE POLACRILEX 4 MG
15-30 LOZENGE BUCCAL
Status: DISCONTINUED | OUTPATIENT
Start: 2022-01-01 | End: 2022-01-01

## 2022-01-01 RX ORDER — LIDOCAINE 40 MG/G
CREAM TOPICAL
Status: DISCONTINUED | OUTPATIENT
Start: 2022-01-01 | End: 2022-01-01 | Stop reason: HOSPADM

## 2022-01-01 RX ORDER — CHOLECALCIFEROL (VITAMIN D3) 50 MCG
50 TABLET ORAL DAILY
Qty: 90 TABLET | Refills: 3 | Status: SHIPPED | OUTPATIENT
Start: 2022-01-01 | End: 2022-10-14

## 2022-01-01 RX ORDER — ONDANSETRON 2 MG/ML
4 INJECTION INTRAMUSCULAR; INTRAVENOUS EVERY 6 HOURS PRN
Status: DISCONTINUED | OUTPATIENT
Start: 2022-01-01 | End: 2022-01-01 | Stop reason: HOSPADM

## 2022-01-01 RX ORDER — ACETYLCYSTEINE 100 MG/ML
4 SOLUTION ORAL; RESPIRATORY (INHALATION) 2 TIMES DAILY
Status: DISCONTINUED | OUTPATIENT
Start: 2022-01-01 | End: 2022-01-01 | Stop reason: HOSPADM

## 2022-01-01 RX ORDER — BENZONATATE 100 MG/1
100 CAPSULE ORAL 3 TIMES DAILY PRN
Status: DISCONTINUED | OUTPATIENT
Start: 2022-01-01 | End: 2022-01-01 | Stop reason: HOSPADM

## 2022-01-01 RX ORDER — LEVOTHYROXINE SODIUM 100 UG/1
200 TABLET ORAL DAILY
Status: DISCONTINUED | OUTPATIENT
Start: 2022-01-01 | End: 2022-01-01 | Stop reason: HOSPADM

## 2022-01-01 RX ORDER — GABAPENTIN 100 MG/1
100 CAPSULE ORAL DAILY
Qty: 10 CAPSULE | Refills: 0 | Status: SHIPPED | OUTPATIENT
Start: 2022-01-01 | End: 2022-08-21

## 2022-01-01 RX ORDER — POLYETHYLENE GLYCOL 3350 17 G/17G
17 POWDER, FOR SOLUTION ORAL 2 TIMES DAILY
Status: DISCONTINUED | OUTPATIENT
Start: 2022-01-01 | End: 2022-01-01 | Stop reason: HOSPADM

## 2022-01-01 RX ORDER — LORAZEPAM 0.5 MG/1
0.5 TABLET ORAL
Status: ON HOLD | COMMUNITY
End: 2022-01-01

## 2022-01-01 RX ORDER — ACETAMINOPHEN 325 MG/1
650 TABLET ORAL EVERY 4 HOURS PRN
Status: DISCONTINUED | OUTPATIENT
Start: 2022-01-01 | End: 2022-01-01 | Stop reason: HOSPADM

## 2022-01-01 RX ORDER — LEVOTHYROXINE SODIUM 200 UG/1
200 TABLET ORAL DAILY
Status: ON HOLD | COMMUNITY
End: 2022-01-01

## 2022-01-01 RX ORDER — DEXTROSE MONOHYDRATE 25 G/50ML
25-50 INJECTION, SOLUTION INTRAVENOUS
Status: DISCONTINUED | OUTPATIENT
Start: 2022-01-01 | End: 2022-01-01

## 2022-01-01 RX ORDER — FORMOTEROL FUMARATE DIHYDRATE 20 UG/2ML
20 SOLUTION RESPIRATORY (INHALATION) EVERY 12 HOURS
Status: DISCONTINUED | OUTPATIENT
Start: 2022-01-01 | End: 2022-01-01 | Stop reason: HOSPADM

## 2022-01-01 RX ORDER — MIDODRINE HYDROCHLORIDE 10 MG/1
10 TABLET ORAL
Refills: 0 | Status: ON HOLD
Start: 2022-01-01 | End: 2022-01-01

## 2022-01-01 RX ORDER — ACETAMINOPHEN 500 MG
1000 TABLET ORAL EVERY EVENING
Qty: 10 TABLET | Refills: 0 | Status: SHIPPED | OUTPATIENT
Start: 2022-01-01 | End: 2022-08-21

## 2022-01-01 RX ORDER — DIPHENHYDRAMINE HYDROCHLORIDE 50 MG/ML
25-50 INJECTION INTRAMUSCULAR; INTRAVENOUS EVERY 6 HOURS PRN
Status: DISCONTINUED | OUTPATIENT
Start: 2022-01-01 | End: 2022-01-01 | Stop reason: HOSPADM

## 2022-01-01 RX ORDER — LORAZEPAM 2 MG/ML
1 CONCENTRATE ORAL EVERY 6 HOURS PRN
COMMUNITY

## 2022-01-01 RX ORDER — ONDANSETRON 4 MG/1
4 TABLET, ORALLY DISINTEGRATING ORAL EVERY 8 HOURS
COMMUNITY

## 2022-01-01 RX ORDER — IOPAMIDOL 755 MG/ML
67 INJECTION, SOLUTION INTRAVASCULAR ONCE
Status: COMPLETED | OUTPATIENT
Start: 2022-01-01 | End: 2022-01-01

## 2022-01-01 RX ORDER — DOXERCALCIFEROL 4 UG/2ML
1.5 INJECTION INTRAVENOUS
Status: COMPLETED | OUTPATIENT
Start: 2022-01-01 | End: 2022-01-01

## 2022-01-01 RX ORDER — BUDESONIDE 0.25 MG/2ML
0.25 INHALANT ORAL 2 TIMES DAILY
Status: CANCELLED | OUTPATIENT
Start: 2022-01-01

## 2022-01-01 RX ORDER — ONDANSETRON 4 MG/1
4 TABLET, ORALLY DISINTEGRATING ORAL EVERY 6 HOURS PRN
Status: DISCONTINUED | OUTPATIENT
Start: 2022-01-01 | End: 2022-01-01 | Stop reason: HOSPADM

## 2022-01-01 RX ORDER — BUDESONIDE 0.5 MG/2ML
0.5 INHALANT ORAL 2 TIMES DAILY
Status: ON HOLD
Start: 2022-01-01 | End: 2022-01-01

## 2022-01-01 RX ORDER — ASPIRIN 81 MG/1
81 TABLET ORAL AT BEDTIME
Status: DISCONTINUED | OUTPATIENT
Start: 2022-01-01 | End: 2022-01-01 | Stop reason: HOSPADM

## 2022-01-01 RX ORDER — CALCIUM GLUCONATE 94 MG/ML
1 INJECTION, SOLUTION INTRAVENOUS ONCE
Status: DISCONTINUED | OUTPATIENT
Start: 2022-01-01 | End: 2022-01-01

## 2022-01-01 RX ORDER — LEVOTHYROXINE SODIUM 200 UG/1
200 TABLET ORAL DAILY
Status: DISCONTINUED | OUTPATIENT
Start: 2022-01-01 | End: 2022-01-01

## 2022-01-01 RX ORDER — GABAPENTIN 100 MG/1
100 CAPSULE ORAL DAILY
Status: DISCONTINUED | OUTPATIENT
Start: 2022-01-01 | End: 2022-01-01 | Stop reason: HOSPADM

## 2022-01-01 RX ORDER — TIOTROPIUM BROMIDE INHALATION SPRAY 3.12 UG/1
1-2 SPRAY, METERED RESPIRATORY (INHALATION) DAILY
COMMUNITY
Start: 2022-01-01 | End: 2022-01-01

## 2022-01-01 RX ORDER — MORPHINE SULFATE 2 MG/ML
1 INJECTION, SOLUTION INTRAMUSCULAR; INTRAVENOUS ONCE
Status: COMPLETED | OUTPATIENT
Start: 2022-01-01 | End: 2022-01-01

## 2022-01-01 RX ORDER — CEFTRIAXONE 2 G/1
2 INJECTION, POWDER, FOR SOLUTION INTRAMUSCULAR; INTRAVENOUS
Start: 2022-01-01 | End: 2022-01-01

## 2022-01-01 RX ORDER — AMOXICILLIN 250 MG
2 CAPSULE ORAL 2 TIMES DAILY PRN
Status: DISCONTINUED | OUTPATIENT
Start: 2022-01-01 | End: 2022-01-01 | Stop reason: HOSPADM

## 2022-01-01 RX ORDER — DIPHENHYDRAMINE HYDROCHLORIDE 50 MG/ML
50 INJECTION INTRAMUSCULAR; INTRAVENOUS ONCE
Status: COMPLETED | OUTPATIENT
Start: 2022-01-01 | End: 2022-01-01

## 2022-01-01 RX ORDER — METHYLPREDNISOLONE 32 MG/1
32 TABLET ORAL ONCE
Status: DISCONTINUED | OUTPATIENT
Start: 2022-01-01 | End: 2022-01-01

## 2022-01-01 RX ORDER — ATROPINE SULFATE 10 MG/ML
1 SOLUTION/ DROPS OPHTHALMIC EVERY 6 HOURS PRN
COMMUNITY

## 2022-01-01 RX ORDER — ALBUTEROL SULFATE 0.83 MG/ML
2.5 SOLUTION RESPIRATORY (INHALATION) EVERY 6 HOURS PRN
Status: DISCONTINUED | OUTPATIENT
Start: 2022-01-01 | End: 2022-01-01

## 2022-01-01 RX ORDER — IPRATROPIUM BROMIDE AND ALBUTEROL SULFATE 2.5; .5 MG/3ML; MG/3ML
3 SOLUTION RESPIRATORY (INHALATION) 4 TIMES DAILY PRN
Status: DISCONTINUED | OUTPATIENT
Start: 2022-01-01 | End: 2022-01-01 | Stop reason: HOSPADM

## 2022-01-01 RX ORDER — AMOXICILLIN 250 MG
2 CAPSULE ORAL AT BEDTIME
Status: ON HOLD | COMMUNITY
End: 2022-01-01

## 2022-01-01 RX ORDER — ACETAMINOPHEN 325 MG/1
325 TABLET ORAL EVERY 4 HOURS PRN
Status: DISCONTINUED | OUTPATIENT
Start: 2022-01-01 | End: 2022-01-01 | Stop reason: HOSPADM

## 2022-01-01 RX ORDER — ACETYLCYSTEINE 100 MG/ML
4 SOLUTION ORAL; RESPIRATORY (INHALATION) 2 TIMES DAILY
Status: ON HOLD
Start: 2022-01-01 | End: 2022-01-01

## 2022-01-01 RX ORDER — BENZTROPINE MESYLATE 0.5 MG/1
1 TABLET ORAL 3 TIMES DAILY PRN
Status: DISCONTINUED | OUTPATIENT
Start: 2022-01-01 | End: 2022-01-01 | Stop reason: HOSPADM

## 2022-01-01 RX ORDER — MIDODRINE HYDROCHLORIDE 5 MG/1
10 TABLET ORAL ONCE
Status: COMPLETED | OUTPATIENT
Start: 2022-01-01 | End: 2022-01-01

## 2022-01-01 RX ORDER — DOXERCALCIFEROL 4 UG/2ML
4 INJECTION INTRAVENOUS
Status: CANCELLED | OUTPATIENT
Start: 2022-01-01 | End: 2022-01-01

## 2022-01-01 RX ORDER — MIDODRINE HYDROCHLORIDE 5 MG/1
15 TABLET ORAL
Status: DISCONTINUED | OUTPATIENT
Start: 2022-01-01 | End: 2022-01-01

## 2022-01-01 RX ORDER — IPRATROPIUM BROMIDE AND ALBUTEROL SULFATE 2.5; .5 MG/3ML; MG/3ML
3 SOLUTION RESPIRATORY (INHALATION)
Status: DISCONTINUED | OUTPATIENT
Start: 2022-01-01 | End: 2022-01-01

## 2022-01-01 RX ORDER — DIPHENHYDRAMINE HCL 25 MG
25-50 CAPSULE ORAL EVERY 6 HOURS PRN
Status: DISCONTINUED | OUTPATIENT
Start: 2022-01-01 | End: 2022-01-01 | Stop reason: HOSPADM

## 2022-01-01 RX ORDER — IPRATROPIUM BROMIDE AND ALBUTEROL SULFATE 2.5; .5 MG/3ML; MG/3ML
1 SOLUTION RESPIRATORY (INHALATION) EVERY 6 HOURS PRN
Status: ON HOLD | COMMUNITY
End: 2022-01-01

## 2022-01-01 RX ORDER — POLYETHYLENE GLYCOL 3350 17 G/17G
17 POWDER, FOR SOLUTION ORAL DAILY PRN
COMMUNITY

## 2022-01-01 RX ADMIN — BUDESONIDE 0.5 MG: 0.5 INHALANT ORAL at 20:33

## 2022-01-01 RX ADMIN — METHYLPREDNISOLONE SODIUM SUCCINATE 40 MG: 40 INJECTION, POWDER, FOR SOLUTION INTRAMUSCULAR; INTRAVENOUS at 02:40

## 2022-01-01 RX ADMIN — MIDODRINE HYDROCHLORIDE 10 MG: 5 TABLET ORAL at 13:18

## 2022-01-01 RX ADMIN — Medication 3 MG: at 21:24

## 2022-01-01 RX ADMIN — LEVOTHYROXINE SODIUM 175 MCG: 0.05 TABLET ORAL at 08:01

## 2022-01-01 RX ADMIN — B-COMPLEX W/ C & FOLIC ACID TAB 1 TABLET: TAB at 21:35

## 2022-01-01 RX ADMIN — LORAZEPAM 0.5 MG: 0.5 TABLET ORAL at 00:36

## 2022-01-01 RX ADMIN — LEVOTHYROXINE SODIUM 175 MCG: 0.05 TABLET ORAL at 08:02

## 2022-01-01 RX ADMIN — SODIUM CHLORIDE 300 ML: 9 INJECTION, SOLUTION INTRAVENOUS at 08:25

## 2022-01-01 RX ADMIN — MIDODRINE HYDROCHLORIDE 10 MG: 5 TABLET ORAL at 07:50

## 2022-01-01 RX ADMIN — ACETYLCYSTEINE 4 ML: 100 SOLUTION ORAL; RESPIRATORY (INHALATION) at 16:33

## 2022-01-01 RX ADMIN — ACETAMINOPHEN 325 MG: 325 TABLET, FILM COATED ORAL at 08:00

## 2022-01-01 RX ADMIN — BUDESONIDE 0.5 MG: 0.5 INHALANT ORAL at 21:17

## 2022-01-01 RX ADMIN — PANTOPRAZOLE SODIUM 40 MG: 40 TABLET, DELAYED RELEASE ORAL at 08:01

## 2022-01-01 RX ADMIN — POLYETHYLENE GLYCOL 3350 17 G: 17 POWDER, FOR SOLUTION ORAL at 19:24

## 2022-01-01 RX ADMIN — FORMOTEROL FUMARATE DIHYDRATE 20 MCG: 20 SOLUTION RESPIRATORY (INHALATION) at 08:05

## 2022-01-01 RX ADMIN — MIDODRINE HYDROCHLORIDE 10 MG: 5 TABLET ORAL at 17:47

## 2022-01-01 RX ADMIN — Medication 50 MCG: at 08:31

## 2022-01-01 RX ADMIN — ATORVASTATIN CALCIUM 40 MG: 40 TABLET, FILM COATED ORAL at 21:41

## 2022-01-01 RX ADMIN — PANTOPRAZOLE SODIUM 40 MG: 40 TABLET, DELAYED RELEASE ORAL at 20:56

## 2022-01-01 RX ADMIN — LEVOTHYROXINE SODIUM 175 MCG: 0.05 TABLET ORAL at 08:28

## 2022-01-01 RX ADMIN — Medication: at 08:18

## 2022-01-01 RX ADMIN — SODIUM CHLORIDE 250 ML: 9 INJECTION, SOLUTION INTRAVENOUS at 12:19

## 2022-01-01 RX ADMIN — CEFTRIAXONE SODIUM 2 G: 2 INJECTION, POWDER, FOR SOLUTION INTRAMUSCULAR; INTRAVENOUS at 16:54

## 2022-01-01 RX ADMIN — MIDODRINE HYDROCHLORIDE 10 MG: 5 TABLET ORAL at 12:41

## 2022-01-01 RX ADMIN — ACETAMINOPHEN 650 MG: 325 TABLET, FILM COATED ORAL at 01:51

## 2022-01-01 RX ADMIN — ALBUTEROL SULFATE 2.5 MG: 2.5 SOLUTION RESPIRATORY (INHALATION) at 20:32

## 2022-01-01 RX ADMIN — HEPARIN SODIUM AND DEXTROSE 1650 UNITS/HR: 10000; 5 INJECTION INTRAVENOUS at 15:34

## 2022-01-01 RX ADMIN — GABAPENTIN 100 MG: 100 CAPSULE ORAL at 10:09

## 2022-01-01 RX ADMIN — PANTOPRAZOLE SODIUM 40 MG: 40 TABLET, DELAYED RELEASE ORAL at 20:46

## 2022-01-01 RX ADMIN — MIDODRINE HYDROCHLORIDE 10 MG: 5 TABLET ORAL at 08:17

## 2022-01-01 RX ADMIN — PANTOPRAZOLE SODIUM 40 MG: 40 TABLET, DELAYED RELEASE ORAL at 19:55

## 2022-01-01 RX ADMIN — MIDODRINE HYDROCHLORIDE 10 MG: 5 TABLET ORAL at 18:42

## 2022-01-01 RX ADMIN — PANTOPRAZOLE SODIUM 40 MG: 40 TABLET, DELAYED RELEASE ORAL at 22:06

## 2022-01-01 RX ADMIN — BUDESONIDE 0.5 MG: 0.5 INHALANT ORAL at 20:32

## 2022-01-01 RX ADMIN — METHYLPREDNISOLONE SODIUM SUCCINATE 40 MG: 40 INJECTION, POWDER, FOR SOLUTION INTRAMUSCULAR; INTRAVENOUS at 22:46

## 2022-01-01 RX ADMIN — ACETAMINOPHEN 325 MG: 325 TABLET, FILM COATED ORAL at 13:19

## 2022-01-01 RX ADMIN — UMECLIDINIUM 1 PUFF: 62.5 AEROSOL, POWDER ORAL at 07:02

## 2022-01-01 RX ADMIN — FORMOTEROL FUMARATE DIHYDRATE 20 MCG: 20 SOLUTION RESPIRATORY (INHALATION) at 20:15

## 2022-01-01 RX ADMIN — ALBUMIN HUMAN 12.5 G: 0.25 SOLUTION INTRAVENOUS at 18:56

## 2022-01-01 RX ADMIN — ACETYLCYSTEINE 4 ML: 100 SOLUTION ORAL; RESPIRATORY (INHALATION) at 03:45

## 2022-01-01 RX ADMIN — PIPERACILLIN SODIUM AND TAZOBACTAM SODIUM 2.25 G: 2; .25 INJECTION, POWDER, LYOPHILIZED, FOR SOLUTION INTRAVENOUS at 02:29

## 2022-01-01 RX ADMIN — ATORVASTATIN CALCIUM 40 MG: 40 TABLET, FILM COATED ORAL at 21:49

## 2022-01-01 RX ADMIN — MIDODRINE HYDROCHLORIDE 10 MG: 5 TABLET ORAL at 10:14

## 2022-01-01 RX ADMIN — PANTOPRAZOLE SODIUM 40 MG: 40 TABLET, DELAYED RELEASE ORAL at 20:39

## 2022-01-01 RX ADMIN — BUDESONIDE 0.5 MG: 0.5 INHALANT ORAL at 20:08

## 2022-01-01 RX ADMIN — B-COMPLEX W/ C & FOLIC ACID TAB 1 TABLET: TAB at 17:48

## 2022-01-01 RX ADMIN — ACETAMINOPHEN 650 MG: 325 TABLET, FILM COATED ORAL at 19:59

## 2022-01-01 RX ADMIN — MIDODRINE HYDROCHLORIDE 10 MG: 5 TABLET ORAL at 17:30

## 2022-01-01 RX ADMIN — ACETYLCYSTEINE 4 ML: 100 SOLUTION ORAL; RESPIRATORY (INHALATION) at 21:17

## 2022-01-01 RX ADMIN — ALBUTEROL SULFATE 2.5 MG: 2.5 SOLUTION RESPIRATORY (INHALATION) at 19:58

## 2022-01-01 RX ADMIN — PANTOPRAZOLE SODIUM 40 MG: 40 TABLET, DELAYED RELEASE ORAL at 12:43

## 2022-01-01 RX ADMIN — MIDODRINE HYDROCHLORIDE 10 MG: 5 TABLET ORAL at 08:33

## 2022-01-01 RX ADMIN — AMPICILLIN SODIUM AND SULBACTAM SODIUM 3 G: 2; 1 INJECTION, POWDER, FOR SOLUTION INTRAMUSCULAR; INTRAVENOUS at 18:48

## 2022-01-01 RX ADMIN — ACETYLCYSTEINE 4 ML: 100 SOLUTION ORAL; RESPIRATORY (INHALATION) at 09:21

## 2022-01-01 RX ADMIN — UMECLIDINIUM 1 PUFF: 62.5 AEROSOL, POWDER ORAL at 08:01

## 2022-01-01 RX ADMIN — HEPARIN SODIUM 5000 UNITS: 5000 INJECTION, SOLUTION INTRAVENOUS; SUBCUTANEOUS at 00:35

## 2022-01-01 RX ADMIN — FORMOTEROL FUMARATE DIHYDRATE 20 MCG: 20 SOLUTION RESPIRATORY (INHALATION) at 07:52

## 2022-01-01 RX ADMIN — MIDODRINE HYDROCHLORIDE 10 MG: 5 TABLET ORAL at 05:07

## 2022-01-01 RX ADMIN — HEPARIN SODIUM 5000 UNITS: 5000 INJECTION, SOLUTION INTRAVENOUS; SUBCUTANEOUS at 08:29

## 2022-01-01 RX ADMIN — PANTOPRAZOLE SODIUM 40 MG: 40 TABLET, DELAYED RELEASE ORAL at 19:24

## 2022-01-01 RX ADMIN — FORMOTEROL FUMARATE DIHYDRATE 20 MCG: 20 SOLUTION RESPIRATORY (INHALATION) at 07:36

## 2022-01-01 RX ADMIN — B-COMPLEX W/ C & FOLIC ACID TAB 1 TABLET: TAB at 17:30

## 2022-01-01 RX ADMIN — PANTOPRAZOLE SODIUM 40 MG: 40 TABLET, DELAYED RELEASE ORAL at 08:17

## 2022-01-01 RX ADMIN — ACETAMINOPHEN 650 MG: 325 TABLET, FILM COATED ORAL at 03:37

## 2022-01-01 RX ADMIN — FORMOTEROL FUMARATE DIHYDRATE 20 MCG: 20 SOLUTION RESPIRATORY (INHALATION) at 14:26

## 2022-01-01 RX ADMIN — MIDODRINE HYDROCHLORIDE 10 MG: 5 TABLET ORAL at 12:45

## 2022-01-01 RX ADMIN — MIDODRINE HYDROCHLORIDE 10 MG: 5 TABLET ORAL at 13:19

## 2022-01-01 RX ADMIN — MIDODRINE HYDROCHLORIDE 10 MG: 5 TABLET ORAL at 08:28

## 2022-01-01 RX ADMIN — Medication 50 MCG: at 10:09

## 2022-01-01 RX ADMIN — ACETYLCYSTEINE 4 ML: 100 SOLUTION ORAL; RESPIRATORY (INHALATION) at 19:49

## 2022-01-01 RX ADMIN — SODIUM CHLORIDE, POTASSIUM CHLORIDE, SODIUM LACTATE AND CALCIUM CHLORIDE 250 ML: 600; 310; 30; 20 INJECTION, SOLUTION INTRAVENOUS at 00:28

## 2022-01-01 RX ADMIN — FORMOTEROL FUMARATE DIHYDRATE 20 MCG: 20 SOLUTION RESPIRATORY (INHALATION) at 19:16

## 2022-01-01 RX ADMIN — Medication 50 MCG: at 09:21

## 2022-01-01 RX ADMIN — BUDESONIDE 0.5 MG: 0.5 INHALANT ORAL at 09:32

## 2022-01-01 RX ADMIN — ALBUTEROL SULFATE 2.5 MG: 2.5 SOLUTION RESPIRATORY (INHALATION) at 16:33

## 2022-01-01 RX ADMIN — IPRATROPIUM BROMIDE AND ALBUTEROL SULFATE 3 ML: 2.5; .5 SOLUTION RESPIRATORY (INHALATION) at 21:17

## 2022-01-01 RX ADMIN — SODIUM CHLORIDE 250 ML: 9 INJECTION, SOLUTION INTRAVENOUS at 08:10

## 2022-01-01 RX ADMIN — ASPIRIN 81 MG: 81 TABLET, COATED ORAL at 22:30

## 2022-01-01 RX ADMIN — B-COMPLEX W/ C & FOLIC ACID TAB 1 TABLET: TAB at 18:14

## 2022-01-01 RX ADMIN — CEFTRIAXONE SODIUM 2 G: 2 INJECTION, POWDER, FOR SOLUTION INTRAMUSCULAR; INTRAVENOUS at 17:10

## 2022-01-01 RX ADMIN — FORMOTEROL FUMARATE DIHYDRATE 20 MCG: 20 SOLUTION RESPIRATORY (INHALATION) at 22:08

## 2022-01-01 RX ADMIN — FORMOTEROL FUMARATE DIHYDRATE 20 MCG: 20 SOLUTION RESPIRATORY (INHALATION) at 20:50

## 2022-01-01 RX ADMIN — HEPARIN SODIUM 5000 UNITS: 5000 INJECTION, SOLUTION INTRAVENOUS; SUBCUTANEOUS at 20:46

## 2022-01-01 RX ADMIN — ACETYLCYSTEINE 4 ML: 100 SOLUTION ORAL; RESPIRATORY (INHALATION) at 20:45

## 2022-01-01 RX ADMIN — FORMOTEROL FUMARATE DIHYDRATE 20 MCG: 20 SOLUTION RESPIRATORY (INHALATION) at 20:14

## 2022-01-01 RX ADMIN — ATORVASTATIN CALCIUM 40 MG: 40 TABLET, FILM COATED ORAL at 21:24

## 2022-01-01 RX ADMIN — SODIUM CHLORIDE 1000 ML: 9 INJECTION, SOLUTION INTRAVENOUS at 18:57

## 2022-01-01 RX ADMIN — BISACODYL 10 MG: 10 SUPPOSITORY RECTAL at 10:52

## 2022-01-01 RX ADMIN — PANTOPRAZOLE SODIUM 40 MG: 40 TABLET, DELAYED RELEASE ORAL at 19:30

## 2022-01-01 RX ADMIN — ALBUMIN HUMAN 25 G: 0.25 SOLUTION INTRAVENOUS at 11:22

## 2022-01-01 RX ADMIN — ALBUMIN HUMAN 50 ML: 0.25 SOLUTION INTRAVENOUS at 09:47

## 2022-01-01 RX ADMIN — LEVOTHYROXINE SODIUM 250 MCG: 0.15 TABLET ORAL at 09:19

## 2022-01-01 RX ADMIN — ACETYLCYSTEINE 4 ML: 100 SOLUTION ORAL; RESPIRATORY (INHALATION) at 20:38

## 2022-01-01 RX ADMIN — PANTOPRAZOLE SODIUM 40 MG: 40 TABLET, DELAYED RELEASE ORAL at 19:48

## 2022-01-01 RX ADMIN — MAGNESIUM SULFATE HEPTAHYDRATE 3 G: 500 INJECTION, SOLUTION INTRAMUSCULAR; INTRAVENOUS at 16:09

## 2022-01-01 RX ADMIN — IPRATROPIUM BROMIDE AND ALBUTEROL SULFATE 3 ML: 2.5; .5 SOLUTION RESPIRATORY (INHALATION) at 09:21

## 2022-01-01 RX ADMIN — ATORVASTATIN CALCIUM 40 MG: 40 TABLET, FILM COATED ORAL at 21:52

## 2022-01-01 RX ADMIN — MIDODRINE HYDROCHLORIDE 10 MG: 5 TABLET ORAL at 08:02

## 2022-01-01 RX ADMIN — ACETYLCYSTEINE 4 ML: 100 SOLUTION ORAL; RESPIRATORY (INHALATION) at 08:14

## 2022-01-01 RX ADMIN — ATORVASTATIN CALCIUM 40 MG: 40 TABLET, FILM COATED ORAL at 21:32

## 2022-01-01 RX ADMIN — ACETYLCYSTEINE 4 ML: 100 SOLUTION ORAL; RESPIRATORY (INHALATION) at 21:25

## 2022-01-01 RX ADMIN — SODIUM CHLORIDE 300 ML: 9 INJECTION, SOLUTION INTRAVENOUS at 08:18

## 2022-01-01 RX ADMIN — Medication 3 MG: at 22:22

## 2022-01-01 RX ADMIN — MIDODRINE HYDROCHLORIDE 10 MG: 5 TABLET ORAL at 12:42

## 2022-01-01 RX ADMIN — HEPARIN SODIUM 5000 UNITS: 5000 INJECTION, SOLUTION INTRAVENOUS; SUBCUTANEOUS at 08:01

## 2022-01-01 RX ADMIN — FORMOTEROL FUMARATE DIHYDRATE 20 MCG: 20 SOLUTION RESPIRATORY (INHALATION) at 21:24

## 2022-01-01 RX ADMIN — PANTOPRAZOLE SODIUM 40 MG: 40 TABLET, DELAYED RELEASE ORAL at 21:52

## 2022-01-01 RX ADMIN — HEPARIN SODIUM 5000 UNITS: 5000 INJECTION, SOLUTION INTRAVENOUS; SUBCUTANEOUS at 08:17

## 2022-01-01 RX ADMIN — ONDANSETRON 4 MG: 4 TABLET, ORALLY DISINTEGRATING ORAL at 18:19

## 2022-01-01 RX ADMIN — Medication 1 CAPSULE: at 08:02

## 2022-01-01 RX ADMIN — ACETYLCYSTEINE 4 ML: 100 SOLUTION ORAL; RESPIRATORY (INHALATION) at 07:57

## 2022-01-01 RX ADMIN — Medication 12.5 MG: at 04:22

## 2022-01-01 RX ADMIN — MIDODRINE HYDROCHLORIDE 10 MG: 5 TABLET ORAL at 12:44

## 2022-01-01 RX ADMIN — BUDESONIDE 0.5 MG: 0.5 INHALANT ORAL at 21:58

## 2022-01-01 RX ADMIN — PANTOPRAZOLE SODIUM 40 MG: 40 TABLET, DELAYED RELEASE ORAL at 08:31

## 2022-01-01 RX ADMIN — PANTOPRAZOLE SODIUM 40 MG: 40 TABLET, DELAYED RELEASE ORAL at 19:36

## 2022-01-01 RX ADMIN — ASPIRIN 81 MG: 81 TABLET, COATED ORAL at 21:32

## 2022-01-01 RX ADMIN — B-COMPLEX W/ C & FOLIC ACID TAB 1 TABLET: TAB at 17:47

## 2022-01-01 RX ADMIN — ASPIRIN 81 MG: 81 TABLET, COATED ORAL at 21:24

## 2022-01-01 RX ADMIN — ATORVASTATIN CALCIUM 40 MG: 40 TABLET, FILM COATED ORAL at 21:42

## 2022-01-01 RX ADMIN — MIDODRINE HYDROCHLORIDE 10 MG: 5 TABLET ORAL at 14:14

## 2022-01-01 RX ADMIN — Medication 1 CAPSULE: at 13:18

## 2022-01-01 RX ADMIN — ACETYLCYSTEINE 4 ML: 100 SOLUTION ORAL; RESPIRATORY (INHALATION) at 07:50

## 2022-01-01 RX ADMIN — PANTOPRAZOLE SODIUM 40 MG: 40 TABLET, DELAYED RELEASE ORAL at 21:06

## 2022-01-01 RX ADMIN — MIDODRINE HYDROCHLORIDE 10 MG: 5 TABLET ORAL at 18:26

## 2022-01-01 RX ADMIN — BUDESONIDE 0.5 MG: 0.5 INHALANT ORAL at 07:36

## 2022-01-01 RX ADMIN — Medication 1 CAPSULE: at 08:31

## 2022-01-01 RX ADMIN — ACETYLCYSTEINE 4 ML: 100 SOLUTION ORAL; RESPIRATORY (INHALATION) at 20:33

## 2022-01-01 RX ADMIN — PANTOPRAZOLE SODIUM 40 MG: 40 TABLET, DELAYED RELEASE ORAL at 20:41

## 2022-01-01 RX ADMIN — QUETIAPINE FUMARATE 25 MG: 25 TABLET ORAL at 21:29

## 2022-01-01 RX ADMIN — FORMOTEROL FUMARATE DIHYDRATE 20 MCG: 20 SOLUTION RESPIRATORY (INHALATION) at 09:31

## 2022-01-01 RX ADMIN — BUDESONIDE 0.5 MG: 0.5 INHALANT ORAL at 07:50

## 2022-01-01 RX ADMIN — ALBUMIN HUMAN 12.5 G: 0.25 SOLUTION INTRAVENOUS at 18:41

## 2022-01-01 RX ADMIN — LEVOTHYROXINE SODIUM 200 MCG: 100 TABLET ORAL at 08:28

## 2022-01-01 RX ADMIN — VANCOMYCIN HYDROCHLORIDE 1750 MG: 10 INJECTION, POWDER, LYOPHILIZED, FOR SOLUTION INTRAVENOUS at 21:52

## 2022-01-01 RX ADMIN — HEPARIN SODIUM 5000 UNITS: 5000 INJECTION, SOLUTION INTRAVENOUS; SUBCUTANEOUS at 20:06

## 2022-01-01 RX ADMIN — BUDESONIDE 0.5 MG: 0.5 INHALANT ORAL at 09:01

## 2022-01-01 RX ADMIN — MIDODRINE HYDROCHLORIDE 10 MG: 5 TABLET ORAL at 19:18

## 2022-01-01 RX ADMIN — MIDODRINE HYDROCHLORIDE 10 MG: 5 TABLET ORAL at 18:14

## 2022-01-01 RX ADMIN — IOPAMIDOL 67 ML: 755 INJECTION, SOLUTION INTRAVENOUS at 01:44

## 2022-01-01 RX ADMIN — BUDESONIDE 0.5 MG: 0.5 INHALANT ORAL at 07:51

## 2022-01-01 RX ADMIN — PANTOPRAZOLE SODIUM 40 MG: 40 TABLET, DELAYED RELEASE ORAL at 20:06

## 2022-01-01 RX ADMIN — PANTOPRAZOLE SODIUM 40 MG: 40 TABLET, DELAYED RELEASE ORAL at 08:32

## 2022-01-01 RX ADMIN — UMECLIDINIUM 1 PUFF: 62.5 AEROSOL, POWDER ORAL at 19:39

## 2022-01-01 RX ADMIN — ACETAMINOPHEN 650 MG: 325 TABLET, FILM COATED ORAL at 22:12

## 2022-01-01 RX ADMIN — Medication 3 MG: at 22:00

## 2022-01-01 RX ADMIN — BUDESONIDE 0.5 MG: 0.5 INHALANT ORAL at 19:15

## 2022-01-01 RX ADMIN — SODIUM CHLORIDE 250 ML: 9 INJECTION, SOLUTION INTRAVENOUS at 08:25

## 2022-01-01 RX ADMIN — IPRATROPIUM BROMIDE AND ALBUTEROL SULFATE 3 ML: .5; 3 SOLUTION RESPIRATORY (INHALATION) at 11:57

## 2022-01-01 RX ADMIN — ACETYLCYSTEINE 4 ML: 100 SOLUTION ORAL; RESPIRATORY (INHALATION) at 20:32

## 2022-01-01 RX ADMIN — HEPARIN SODIUM 5000 UNITS: 5000 INJECTION, SOLUTION INTRAVENOUS; SUBCUTANEOUS at 19:44

## 2022-01-01 RX ADMIN — SODIUM CHLORIDE 300 ML: 9 INJECTION, SOLUTION INTRAVENOUS at 12:18

## 2022-01-01 RX ADMIN — METHYLPREDNISOLONE SODIUM SUCCINATE 40 MG: 40 INJECTION, POWDER, FOR SOLUTION INTRAMUSCULAR; INTRAVENOUS at 21:50

## 2022-01-01 RX ADMIN — PANTOPRAZOLE SODIUM 40 MG: 40 TABLET, DELAYED RELEASE ORAL at 19:44

## 2022-01-01 RX ADMIN — FORMOTEROL FUMARATE DIHYDRATE 20 MCG: 20 SOLUTION RESPIRATORY (INHALATION) at 20:08

## 2022-01-01 RX ADMIN — MIDODRINE HYDROCHLORIDE 10 MG: 5 TABLET ORAL at 17:51

## 2022-01-01 RX ADMIN — ACETAMINOPHEN 650 MG: 325 TABLET, FILM COATED ORAL at 21:10

## 2022-01-01 RX ADMIN — BUDESONIDE 0.5 MG: 0.5 INHALANT ORAL at 21:24

## 2022-01-01 RX ADMIN — ATORVASTATIN CALCIUM 40 MG: 40 TABLET, FILM COATED ORAL at 21:02

## 2022-01-01 RX ADMIN — MIDODRINE HYDROCHLORIDE 15 MG: 5 TABLET ORAL at 15:15

## 2022-01-01 RX ADMIN — Medication 50 MCG: at 13:18

## 2022-01-01 RX ADMIN — ACETAMINOPHEN 650 MG: 325 TABLET, FILM COATED ORAL at 01:31

## 2022-01-01 RX ADMIN — HEPARIN SODIUM 500 UNITS/HR: 1000 INJECTION INTRAVENOUS; SUBCUTANEOUS at 08:59

## 2022-01-01 RX ADMIN — FORMOTEROL FUMARATE DIHYDRATE 20 MCG: 20 SOLUTION RESPIRATORY (INHALATION) at 08:14

## 2022-01-01 RX ADMIN — PIPERACILLIN SODIUM AND TAZOBACTAM SODIUM 2.25 G: 2; .25 INJECTION, POWDER, LYOPHILIZED, FOR SOLUTION INTRAVENOUS at 19:18

## 2022-01-01 RX ADMIN — B-COMPLEX W/ C & FOLIC ACID TAB 1 TABLET: TAB at 18:42

## 2022-01-01 RX ADMIN — HEPARIN SODIUM 5000 UNITS: 5000 INJECTION, SOLUTION INTRAVENOUS; SUBCUTANEOUS at 08:32

## 2022-01-01 RX ADMIN — B-COMPLEX W/ C & FOLIC ACID TAB 1 TABLET: TAB at 17:00

## 2022-01-01 RX ADMIN — QUETIAPINE FUMARATE 25 MG: 25 TABLET ORAL at 22:30

## 2022-01-01 RX ADMIN — SODIUM CHLORIDE, POTASSIUM CHLORIDE, SODIUM LACTATE AND CALCIUM CHLORIDE 250 ML: 600; 310; 30; 20 INJECTION, SOLUTION INTRAVENOUS at 05:08

## 2022-01-01 RX ADMIN — PANTOPRAZOLE SODIUM 40 MG: 40 TABLET, DELAYED RELEASE ORAL at 21:35

## 2022-01-01 RX ADMIN — SODIUM CHLORIDE 300 ML: 9 INJECTION, SOLUTION INTRAVENOUS at 11:08

## 2022-01-01 RX ADMIN — FORMOTEROL FUMARATE DIHYDRATE 20 MCG: 20 SOLUTION RESPIRATORY (INHALATION) at 08:10

## 2022-01-01 RX ADMIN — IOPAMIDOL 90 ML: 755 INJECTION, SOLUTION INTRAVENOUS at 04:54

## 2022-01-01 RX ADMIN — ASPIRIN 81 MG: 81 TABLET, COATED ORAL at 21:02

## 2022-01-01 RX ADMIN — PANTOPRAZOLE SODIUM 40 MG: 40 TABLET, DELAYED RELEASE ORAL at 08:08

## 2022-01-01 RX ADMIN — FLUTICASONE FUROATE AND VILANTEROL TRIFENATATE 1 PUFF: 100; 25 POWDER RESPIRATORY (INHALATION) at 08:03

## 2022-01-01 RX ADMIN — Medication 50 MCG: at 10:14

## 2022-01-01 RX ADMIN — ACETYLCYSTEINE 4 ML: 100 SOLUTION ORAL; RESPIRATORY (INHALATION) at 08:11

## 2022-01-01 RX ADMIN — PANTOPRAZOLE SODIUM 40 MG: 40 TABLET, DELAYED RELEASE ORAL at 07:46

## 2022-01-01 RX ADMIN — BUDESONIDE 0.5 MG: 0.5 INHALANT ORAL at 20:37

## 2022-01-01 RX ADMIN — Medication 50 MCG: at 08:01

## 2022-01-01 RX ADMIN — ACETAMINOPHEN 650 MG: 325 TABLET, FILM COATED ORAL at 02:22

## 2022-01-01 RX ADMIN — ALBUMIN HUMAN 25 G: 0.05 INJECTION, SOLUTION INTRAVENOUS at 16:54

## 2022-01-01 RX ADMIN — ACETYLCYSTEINE 4 ML: 100 SOLUTION ORAL; RESPIRATORY (INHALATION) at 09:01

## 2022-01-01 RX ADMIN — Medication: at 08:52

## 2022-01-01 RX ADMIN — LEVOTHYROXINE SODIUM 175 MCG: 0.05 TABLET ORAL at 07:46

## 2022-01-01 RX ADMIN — Medication: at 09:18

## 2022-01-01 RX ADMIN — ACETAMINOPHEN 325 MG: 325 TABLET, FILM COATED ORAL at 12:23

## 2022-01-01 RX ADMIN — IPRATROPIUM BROMIDE AND ALBUTEROL SULFATE 3 ML: 2.5; .5 SOLUTION RESPIRATORY (INHALATION) at 20:08

## 2022-01-01 RX ADMIN — ATORVASTATIN CALCIUM 40 MG: 40 TABLET, FILM COATED ORAL at 22:30

## 2022-01-01 RX ADMIN — LEVOTHYROXINE SODIUM 250 MCG: 0.15 TABLET ORAL at 10:14

## 2022-01-01 RX ADMIN — ONDANSETRON 4 MG: 4 TABLET, ORALLY DISINTEGRATING ORAL at 22:37

## 2022-01-01 RX ADMIN — BUDESONIDE 0.5 MG: 0.5 INHALANT ORAL at 08:04

## 2022-01-01 RX ADMIN — MIDODRINE HYDROCHLORIDE 10 MG: 5 TABLET ORAL at 17:58

## 2022-01-01 RX ADMIN — EPOETIN ALFA-EPBX 15000 UNITS: 10000 INJECTION, SOLUTION INTRAVENOUS; SUBCUTANEOUS at 10:05

## 2022-01-01 RX ADMIN — SODIUM CHLORIDE 250 ML: 9 INJECTION, SOLUTION INTRAVENOUS at 09:19

## 2022-01-01 RX ADMIN — ACETYLCYSTEINE 4 ML: 100 SOLUTION ORAL; RESPIRATORY (INHALATION) at 20:08

## 2022-01-01 RX ADMIN — DIPHENHYDRAMINE HYDROCHLORIDE 50 MG: 50 INJECTION, SOLUTION INTRAMUSCULAR; INTRAVENOUS at 03:39

## 2022-01-01 RX ADMIN — ATORVASTATIN CALCIUM 40 MG: 40 TABLET, FILM COATED ORAL at 19:19

## 2022-01-01 RX ADMIN — UMECLIDINIUM 1 PUFF: 62.5 AEROSOL, POWDER ORAL at 08:04

## 2022-01-01 RX ADMIN — BISACODYL 10 MG: 10 SUPPOSITORY RECTAL at 17:07

## 2022-01-01 RX ADMIN — PANTOPRAZOLE SODIUM 40 MG: 40 TABLET, DELAYED RELEASE ORAL at 07:02

## 2022-01-01 RX ADMIN — BUDESONIDE 0.5 MG: 0.5 INHALANT ORAL at 08:14

## 2022-01-01 RX ADMIN — PANTOPRAZOLE SODIUM 40 MG: 40 TABLET, DELAYED RELEASE ORAL at 08:53

## 2022-01-01 RX ADMIN — LIDOCAINE HYDROCHLORIDE 0.5 ML: 10 INJECTION, SOLUTION EPIDURAL; INFILTRATION; INTRACAUDAL; PERINEURAL at 08:53

## 2022-01-01 RX ADMIN — ASPIRIN 81 MG: 81 TABLET, COATED ORAL at 22:12

## 2022-01-01 RX ADMIN — MIDODRINE HYDROCHLORIDE 10 MG: 5 TABLET ORAL at 08:53

## 2022-01-01 RX ADMIN — FLUTICASONE FUROATE AND VILANTEROL TRIFENATATE 1 PUFF: 100; 25 POWDER RESPIRATORY (INHALATION) at 08:01

## 2022-01-01 RX ADMIN — ACETYLCYSTEINE 4 ML: 100 SOLUTION ORAL; RESPIRATORY (INHALATION) at 21:39

## 2022-01-01 RX ADMIN — MIDODRINE HYDROCHLORIDE 10 MG: 5 TABLET ORAL at 12:01

## 2022-01-01 RX ADMIN — SENNOSIDES AND DOCUSATE SODIUM 2 TABLET: 50; 8.6 TABLET ORAL at 10:13

## 2022-01-01 RX ADMIN — IPRATROPIUM BROMIDE AND ALBUTEROL SULFATE 3 ML: 2.5; .5 SOLUTION RESPIRATORY (INHALATION) at 09:31

## 2022-01-01 RX ADMIN — MIDODRINE HYDROCHLORIDE 10 MG: 5 TABLET ORAL at 17:00

## 2022-01-01 RX ADMIN — BENZONATATE 100 MG: 100 CAPSULE ORAL at 22:11

## 2022-01-01 RX ADMIN — ACETYLCYSTEINE 4 ML: 100 SOLUTION ORAL; RESPIRATORY (INHALATION) at 20:14

## 2022-01-01 RX ADMIN — ASPIRIN 81 MG: 81 TABLET ORAL at 21:35

## 2022-01-01 RX ADMIN — ACETYLCYSTEINE 4 ML: 100 SOLUTION ORAL; RESPIRATORY (INHALATION) at 19:58

## 2022-01-01 RX ADMIN — MIDODRINE HYDROCHLORIDE 10 MG: 5 TABLET ORAL at 13:22

## 2022-01-01 RX ADMIN — ACETYLCYSTEINE 4 ML: 100 SOLUTION ORAL; RESPIRATORY (INHALATION) at 19:16

## 2022-01-01 RX ADMIN — ATORVASTATIN CALCIUM 40 MG: 40 TABLET, FILM COATED ORAL at 22:16

## 2022-01-01 RX ADMIN — PANTOPRAZOLE SODIUM 40 MG: 40 TABLET, DELAYED RELEASE ORAL at 08:29

## 2022-01-01 RX ADMIN — IPRATROPIUM BROMIDE AND ALBUTEROL SULFATE 3 ML: 2.5; .5 SOLUTION RESPIRATORY (INHALATION) at 20:14

## 2022-01-01 RX ADMIN — MIDODRINE HYDROCHLORIDE 10 MG: 5 TABLET ORAL at 19:44

## 2022-01-01 RX ADMIN — BUDESONIDE 0.5 MG: 0.5 INHALANT ORAL at 20:14

## 2022-01-01 RX ADMIN — ACETYLCYSTEINE 4 ML: 100 SOLUTION ORAL; RESPIRATORY (INHALATION) at 08:13

## 2022-01-01 RX ADMIN — SODIUM CHLORIDE 300 ML: 9 INJECTION, SOLUTION INTRAVENOUS at 08:10

## 2022-01-01 RX ADMIN — PIPERACILLIN SODIUM AND TAZOBACTAM SODIUM 2.25 G: 2; .25 INJECTION, POWDER, LYOPHILIZED, FOR SOLUTION INTRAVENOUS at 08:03

## 2022-01-01 RX ADMIN — SODIUM CHLORIDE 300 ML: 9 INJECTION, SOLUTION INTRAVENOUS at 08:51

## 2022-01-01 RX ADMIN — ASPIRIN 81 MG: 81 TABLET, COATED ORAL at 21:29

## 2022-01-01 RX ADMIN — MIDODRINE HYDROCHLORIDE 10 MG: 5 TABLET ORAL at 12:38

## 2022-01-01 RX ADMIN — DOXERCALCIFEROL 4 MCG: 2 INJECTION, SOLUTION INTRAVENOUS at 09:47

## 2022-01-01 RX ADMIN — Medication: at 08:25

## 2022-01-01 RX ADMIN — ALBUTEROL SULFATE 2.5 MG: 2.5 SOLUTION RESPIRATORY (INHALATION) at 00:08

## 2022-01-01 RX ADMIN — ACETYLCYSTEINE 4 ML: 100 SOLUTION ORAL; RESPIRATORY (INHALATION) at 07:51

## 2022-01-01 RX ADMIN — MIDODRINE HYDROCHLORIDE 10 MG: 5 TABLET ORAL at 07:46

## 2022-01-01 RX ADMIN — GABAPENTIN 100 MG: 100 CAPSULE ORAL at 09:20

## 2022-01-01 RX ADMIN — IPRATROPIUM BROMIDE AND ALBUTEROL SULFATE 3 ML: 2.5; .5 SOLUTION RESPIRATORY (INHALATION) at 21:25

## 2022-01-01 RX ADMIN — LEVOTHYROXINE SODIUM 250 MCG: 0.15 TABLET ORAL at 08:31

## 2022-01-01 RX ADMIN — ACETAMINOPHEN 325 MG: 325 TABLET, FILM COATED ORAL at 12:20

## 2022-01-01 RX ADMIN — GABAPENTIN 100 MG: 100 CAPSULE ORAL at 08:32

## 2022-01-01 RX ADMIN — ALBUMIN HUMAN 250 ML: 0.05 INJECTION, SOLUTION INTRAVENOUS at 08:55

## 2022-01-01 RX ADMIN — FORMOTEROL FUMARATE DIHYDRATE 20 MCG: 20 SOLUTION RESPIRATORY (INHALATION) at 20:32

## 2022-01-01 RX ADMIN — PANTOPRAZOLE SODIUM 40 MG: 40 TABLET, DELAYED RELEASE ORAL at 08:28

## 2022-01-01 RX ADMIN — POLYETHYLENE GLYCOL 3350 17 G: 17 POWDER, FOR SOLUTION ORAL at 08:28

## 2022-01-01 RX ADMIN — ACETYLCYSTEINE 4 ML: 100 SOLUTION ORAL; RESPIRATORY (INHALATION) at 21:00

## 2022-01-01 RX ADMIN — BUDESONIDE 0.5 MG: 0.5 INHALANT ORAL at 20:50

## 2022-01-01 RX ADMIN — MIDODRINE HYDROCHLORIDE 10 MG: 5 TABLET ORAL at 02:20

## 2022-01-01 RX ADMIN — FORMOTEROL FUMARATE DIHYDRATE 20 MCG: 20 SOLUTION RESPIRATORY (INHALATION) at 21:17

## 2022-01-01 RX ADMIN — LEVOTHYROXINE SODIUM 200 MCG: 0.2 TABLET ORAL at 08:00

## 2022-01-01 RX ADMIN — FORMOTEROL FUMARATE DIHYDRATE 20 MCG: 20 SOLUTION RESPIRATORY (INHALATION) at 09:40

## 2022-01-01 RX ADMIN — METHYLPREDNISOLONE SODIUM SUCCINATE 125 MG: 125 INJECTION, POWDER, FOR SOLUTION INTRAMUSCULAR; INTRAVENOUS at 11:57

## 2022-01-01 RX ADMIN — ATORVASTATIN CALCIUM 40 MG: 40 TABLET, FILM COATED ORAL at 21:06

## 2022-01-01 RX ADMIN — ATORVASTATIN CALCIUM 40 MG: 40 TABLET, FILM COATED ORAL at 20:55

## 2022-01-01 RX ADMIN — LIDOCAINE HYDROCHLORIDE 0.5 ML: 10 INJECTION, SOLUTION EPIDURAL; INFILTRATION; INTRACAUDAL; PERINEURAL at 08:54

## 2022-01-01 RX ADMIN — ATORVASTATIN CALCIUM 40 MG: 40 TABLET, FILM COATED ORAL at 22:00

## 2022-01-01 RX ADMIN — Medication 1 CAPSULE: at 08:32

## 2022-01-01 RX ADMIN — Medication: at 12:20

## 2022-01-01 RX ADMIN — DARBEPOETIN ALFA 60 MCG: 60 INJECTION, SOLUTION INTRAVENOUS; SUBCUTANEOUS at 09:47

## 2022-01-01 RX ADMIN — HEPARIN SODIUM 5000 UNITS: 5000 INJECTION, SOLUTION INTRAVENOUS; SUBCUTANEOUS at 19:54

## 2022-01-01 RX ADMIN — HEPARIN SODIUM 5000 UNITS: 5000 INJECTION, SOLUTION INTRAVENOUS; SUBCUTANEOUS at 08:08

## 2022-01-01 RX ADMIN — PANTOPRAZOLE SODIUM 40 MG: 40 TABLET, DELAYED RELEASE ORAL at 09:19

## 2022-01-01 RX ADMIN — MIDODRINE HYDROCHLORIDE 10 MG: 5 TABLET ORAL at 12:22

## 2022-01-01 RX ADMIN — IPRATROPIUM BROMIDE AND ALBUTEROL SULFATE 3 ML: 2.5; .5 SOLUTION RESPIRATORY (INHALATION) at 19:49

## 2022-01-01 RX ADMIN — ASPIRIN 81 MG: 81 TABLET ORAL at 22:16

## 2022-01-01 RX ADMIN — ALBUMIN HUMAN 50 ML: 0.25 SOLUTION INTRAVENOUS at 11:08

## 2022-01-01 RX ADMIN — LEVOTHYROXINE SODIUM 250 MCG: 0.15 TABLET ORAL at 08:33

## 2022-01-01 RX ADMIN — ACETYLCYSTEINE 4 ML: 100 SOLUTION ORAL; RESPIRATORY (INHALATION) at 11:48

## 2022-01-01 RX ADMIN — ALBUTEROL SULFATE 2.5 MG: 2.5 SOLUTION RESPIRATORY (INHALATION) at 11:48

## 2022-01-01 RX ADMIN — ASPIRIN 81 MG: 81 TABLET ORAL at 21:49

## 2022-01-01 RX ADMIN — ALBUTEROL SULFATE 2.5 MG: 2.5 SOLUTION RESPIRATORY (INHALATION) at 05:08

## 2022-01-01 RX ADMIN — PANTOPRAZOLE SODIUM 40 MG: 40 TABLET, DELAYED RELEASE ORAL at 19:19

## 2022-01-01 RX ADMIN — BUDESONIDE 0.5 MG: 0.5 INHALANT ORAL at 19:49

## 2022-01-01 RX ADMIN — LEVOTHYROXINE SODIUM 175 MCG: 0.05 TABLET ORAL at 08:08

## 2022-01-01 RX ADMIN — FLUTICASONE FUROATE AND VILANTEROL TRIFENATATE 1 PUFF: 100; 25 POWDER RESPIRATORY (INHALATION) at 19:39

## 2022-01-01 RX ADMIN — CEFTRIAXONE SODIUM 2 G: 2 INJECTION, POWDER, FOR SOLUTION INTRAMUSCULAR; INTRAVENOUS at 18:14

## 2022-01-01 RX ADMIN — ALBUMIN HUMAN 25 G: 0.05 INJECTION, SOLUTION INTRAVENOUS at 23:12

## 2022-01-01 RX ADMIN — Medication 3 MG: at 22:30

## 2022-01-01 RX ADMIN — ACETYLCYSTEINE 4 ML: 100 SOLUTION ORAL; RESPIRATORY (INHALATION) at 09:40

## 2022-01-01 RX ADMIN — BUDESONIDE 0.5 MG: 0.5 INHALANT ORAL at 08:09

## 2022-01-01 RX ADMIN — HEPARIN SODIUM 5000 UNITS: 5000 INJECTION, SOLUTION INTRAVENOUS; SUBCUTANEOUS at 19:48

## 2022-01-01 RX ADMIN — Medication 1 CAPSULE: at 10:09

## 2022-01-01 RX ADMIN — PANTOPRAZOLE SODIUM 40 MG: 40 TABLET, DELAYED RELEASE ORAL at 10:09

## 2022-01-01 RX ADMIN — HEPARIN SODIUM 5000 UNITS: 5000 INJECTION, SOLUTION INTRAVENOUS; SUBCUTANEOUS at 19:26

## 2022-01-01 RX ADMIN — MIDODRINE HYDROCHLORIDE 15 MG: 5 TABLET ORAL at 08:01

## 2022-01-01 RX ADMIN — HEPARIN SODIUM 5000 UNITS: 5000 INJECTION, SOLUTION INTRAVENOUS; SUBCUTANEOUS at 08:28

## 2022-01-01 RX ADMIN — FORMOTEROL FUMARATE DIHYDRATE 20 MCG: 20 SOLUTION RESPIRATORY (INHALATION) at 19:49

## 2022-01-01 RX ADMIN — FUROSEMIDE 100 MG: 10 INJECTION, SOLUTION INTRAVENOUS at 19:18

## 2022-01-01 RX ADMIN — HEPARIN SODIUM 5000 UNITS: 5000 INJECTION, SOLUTION INTRAVENOUS; SUBCUTANEOUS at 12:41

## 2022-01-01 RX ADMIN — B-COMPLEX W/ C & FOLIC ACID TAB 1 TABLET: TAB at 17:04

## 2022-01-01 RX ADMIN — ACETAMINOPHEN 650 MG: 325 TABLET, FILM COATED ORAL at 09:45

## 2022-01-01 RX ADMIN — Medication 50 MCG: at 08:32

## 2022-01-01 RX ADMIN — ACETAMINOPHEN 325 MG: 325 TABLET, FILM COATED ORAL at 23:54

## 2022-01-01 RX ADMIN — ATORVASTATIN CALCIUM 40 MG: 40 TABLET, FILM COATED ORAL at 21:35

## 2022-01-01 RX ADMIN — ATORVASTATIN CALCIUM 40 MG: 40 TABLET, FILM COATED ORAL at 21:29

## 2022-01-01 RX ADMIN — CEFTRIAXONE SODIUM 2 G: 2 INJECTION, POWDER, FOR SOLUTION INTRAMUSCULAR; INTRAVENOUS at 16:06

## 2022-01-01 RX ADMIN — ACETAMINOPHEN 650 MG: 325 TABLET, FILM COATED ORAL at 05:43

## 2022-01-01 RX ADMIN — FORMOTEROL FUMARATE DIHYDRATE 20 MCG: 20 SOLUTION RESPIRATORY (INHALATION) at 09:26

## 2022-01-01 RX ADMIN — MIDODRINE HYDROCHLORIDE 10 MG: 5 TABLET ORAL at 09:20

## 2022-01-01 RX ADMIN — Medication 1 CAPSULE: at 07:46

## 2022-01-01 RX ADMIN — NICOTINE 1 CARTRIDGE: 4 INHALANT RESPIRATORY (INHALATION) at 19:00

## 2022-01-01 RX ADMIN — MIDODRINE HYDROCHLORIDE 10 MG: 5 TABLET ORAL at 17:48

## 2022-01-01 RX ADMIN — BUDESONIDE 0.5 MG: 0.5 INHALANT ORAL at 20:45

## 2022-01-01 RX ADMIN — ALBUTEROL SULFATE 2.5 MG: 2.5 SOLUTION RESPIRATORY (INHALATION) at 07:34

## 2022-01-01 RX ADMIN — HEPARIN SODIUM 5000 UNITS: 5000 INJECTION, SOLUTION INTRAVENOUS; SUBCUTANEOUS at 20:55

## 2022-01-01 RX ADMIN — HEPARIN SODIUM 5000 UNITS: 5000 INJECTION, SOLUTION INTRAVENOUS; SUBCUTANEOUS at 20:40

## 2022-01-01 RX ADMIN — B-COMPLEX W/ C & FOLIC ACID TAB 1 TABLET: TAB at 17:58

## 2022-01-01 RX ADMIN — SODIUM CHLORIDE 300 ML: 9 INJECTION, SOLUTION INTRAVENOUS at 09:03

## 2022-01-01 RX ADMIN — SODIUM CHLORIDE 250 ML: 9 INJECTION, SOLUTION INTRAVENOUS at 08:52

## 2022-01-01 RX ADMIN — EPOETIN ALFA-EPBX 4000 UNITS: 10000 INJECTION, SOLUTION INTRAVENOUS; SUBCUTANEOUS at 10:19

## 2022-01-01 RX ADMIN — FORMOTEROL FUMARATE DIHYDRATE 20 MCG: 20 SOLUTION RESPIRATORY (INHALATION) at 20:45

## 2022-01-01 RX ADMIN — BUDESONIDE 0.5 MG: 0.5 INHALANT ORAL at 09:21

## 2022-01-01 RX ADMIN — IPRATROPIUM BROMIDE AND ALBUTEROL SULFATE 3 ML: 2.5; .5 SOLUTION RESPIRATORY (INHALATION) at 07:56

## 2022-01-01 RX ADMIN — MIDODRINE HYDROCHLORIDE 10 MG: 5 TABLET ORAL at 17:04

## 2022-01-01 RX ADMIN — QUETIAPINE FUMARATE 25 MG: 25 TABLET ORAL at 22:12

## 2022-01-01 RX ADMIN — MIDODRINE HYDROCHLORIDE 10 MG: 5 TABLET ORAL at 08:29

## 2022-01-01 RX ADMIN — ACETYLCYSTEINE 4 ML: 100 SOLUTION ORAL; RESPIRATORY (INHALATION) at 09:32

## 2022-01-01 RX ADMIN — ASPIRIN 81 MG: 81 TABLET ORAL at 20:55

## 2022-01-01 RX ADMIN — HEPARIN SODIUM 500 UNITS: 1000 INJECTION INTRAVENOUS; SUBCUTANEOUS at 08:57

## 2022-01-01 RX ADMIN — BUDESONIDE 0.5 MG: 0.5 INHALANT ORAL at 09:40

## 2022-01-01 RX ADMIN — LEVOTHYROXINE SODIUM 250 MCG: 0.15 TABLET ORAL at 10:09

## 2022-01-01 RX ADMIN — MIDODRINE HYDROCHLORIDE 10 MG: 5 TABLET ORAL at 12:17

## 2022-01-01 RX ADMIN — LEVOTHYROXINE SODIUM 175 MCG: 0.05 TABLET ORAL at 06:38

## 2022-01-01 RX ADMIN — Medication 1 MG: at 23:54

## 2022-01-01 RX ADMIN — ALBUTEROL SULFATE 2.5 MG: 2.5 SOLUTION RESPIRATORY (INHALATION) at 03:45

## 2022-01-01 RX ADMIN — GABAPENTIN 100 MG: 100 CAPSULE ORAL at 08:01

## 2022-01-01 RX ADMIN — FORMOTEROL FUMARATE DIHYDRATE 20 MCG: 20 SOLUTION RESPIRATORY (INHALATION) at 20:33

## 2022-01-01 RX ADMIN — ASPIRIN 81 MG: 81 TABLET, COATED ORAL at 21:06

## 2022-01-01 RX ADMIN — SODIUM CHLORIDE 250 ML: 9 INJECTION, SOLUTION INTRAVENOUS at 08:18

## 2022-01-01 RX ADMIN — SENNOSIDES 8.6 MG: 8.6 TABLET, FILM COATED ORAL at 12:44

## 2022-01-01 RX ADMIN — AMPICILLIN SODIUM AND SULBACTAM SODIUM 3 G: 2; 1 INJECTION, POWDER, FOR SOLUTION INTRAMUSCULAR; INTRAVENOUS at 17:53

## 2022-01-01 RX ADMIN — FORMOTEROL FUMARATE DIHYDRATE 20 MCG: 20 SOLUTION RESPIRATORY (INHALATION) at 20:38

## 2022-01-01 RX ADMIN — LEVOTHYROXINE SODIUM 250 MCG: 0.15 TABLET ORAL at 08:17

## 2022-01-01 RX ADMIN — Medication 1 CAPSULE: at 09:18

## 2022-01-01 RX ADMIN — LEVOTHYROXINE SODIUM 175 MCG: 0.05 TABLET ORAL at 08:53

## 2022-01-01 RX ADMIN — MORPHINE SULFATE 1 MG: 2 INJECTION, SOLUTION INTRAMUSCULAR; INTRAVENOUS at 04:26

## 2022-01-01 RX ADMIN — MIDODRINE HYDROCHLORIDE 10 MG: 5 TABLET ORAL at 17:26

## 2022-01-01 RX ADMIN — ACETYLCYSTEINE 4 ML: 100 SOLUTION ORAL; RESPIRATORY (INHALATION) at 00:08

## 2022-01-01 RX ADMIN — CEFTRIAXONE SODIUM 2 G: 2 INJECTION, POWDER, FOR SOLUTION INTRAMUSCULAR; INTRAVENOUS at 18:05

## 2022-01-01 RX ADMIN — PANTOPRAZOLE SODIUM 40 MG: 40 TABLET, DELAYED RELEASE ORAL at 10:14

## 2022-01-01 RX ADMIN — Medication 1 CAPSULE: at 10:14

## 2022-01-01 RX ADMIN — MIDODRINE HYDROCHLORIDE 15 MG: 5 TABLET ORAL at 18:36

## 2022-01-01 RX ADMIN — EPOETIN ALFA-EPBX 15000 UNITS: 10000 INJECTION, SOLUTION INTRAVENOUS; SUBCUTANEOUS at 10:55

## 2022-01-01 RX ADMIN — QUETIAPINE FUMARATE 25 MG: 25 TABLET ORAL at 21:32

## 2022-01-01 RX ADMIN — GABAPENTIN 100 MG: 100 CAPSULE ORAL at 08:17

## 2022-01-01 RX ADMIN — ASPIRIN 81 MG: 81 TABLET, COATED ORAL at 22:00

## 2022-01-01 RX ADMIN — DIPHENHYDRAMINE HYDROCHLORIDE 50 MG: 50 INJECTION, SOLUTION INTRAMUSCULAR; INTRAVENOUS at 23:03

## 2022-01-01 RX ADMIN — SODIUM CHLORIDE 300 ML: 9 INJECTION, SOLUTION INTRAVENOUS at 09:19

## 2022-01-01 RX ADMIN — SODIUM CHLORIDE 250 ML: 9 INJECTION, SOLUTION INTRAVENOUS at 11:08

## 2022-01-01 RX ADMIN — ATORVASTATIN CALCIUM 40 MG: 40 TABLET, FILM COATED ORAL at 22:06

## 2022-01-01 RX ADMIN — IPRATROPIUM BROMIDE AND ALBUTEROL SULFATE 3 ML: 2.5; .5 SOLUTION RESPIRATORY (INHALATION) at 20:50

## 2022-01-01 RX ADMIN — MIDODRINE HYDROCHLORIDE 10 MG: 5 TABLET ORAL at 08:40

## 2022-01-01 RX ADMIN — PANTOPRAZOLE SODIUM 40 MG: 40 TABLET, DELAYED RELEASE ORAL at 08:03

## 2022-01-01 RX ADMIN — QUETIAPINE FUMARATE 25 MG: 25 TABLET ORAL at 22:50

## 2022-01-01 RX ADMIN — LEVOTHYROXINE SODIUM 50 MCG: 0.05 TABLET ORAL at 13:19

## 2022-01-01 RX ADMIN — FLUTICASONE FUROATE AND VILANTEROL TRIFENATATE 1 PUFF: 100; 25 POWDER RESPIRATORY (INHALATION) at 07:02

## 2022-01-01 RX ADMIN — FORMOTEROL FUMARATE DIHYDRATE 20 MCG: 20 SOLUTION RESPIRATORY (INHALATION) at 09:01

## 2022-01-01 RX ADMIN — LORAZEPAM 0.5 MG: 0.5 TABLET ORAL at 23:49

## 2022-01-01 RX ADMIN — Medication 3 MG: at 21:29

## 2022-01-01 RX ADMIN — MIDODRINE HYDROCHLORIDE 10 MG: 5 TABLET ORAL at 10:09

## 2022-01-01 RX ADMIN — ACETYLCYSTEINE 4 ML: 100 SOLUTION ORAL; RESPIRATORY (INHALATION) at 07:34

## 2022-01-01 RX ADMIN — BUDESONIDE 0.5 MG: 0.5 INHALANT ORAL at 08:13

## 2022-01-01 RX ADMIN — MIDODRINE HYDROCHLORIDE 10 MG: 5 TABLET ORAL at 08:08

## 2022-01-01 RX ADMIN — ACETAMINOPHEN 325 MG: 325 TABLET, FILM COATED ORAL at 04:29

## 2022-01-01 RX ADMIN — SENNOSIDES AND DOCUSATE SODIUM 2 TABLET: 50; 8.6 TABLET ORAL at 18:14

## 2022-01-01 RX ADMIN — ATORVASTATIN CALCIUM 40 MG: 40 TABLET, FILM COATED ORAL at 22:11

## 2022-01-01 RX ADMIN — GABAPENTIN 100 MG: 100 CAPSULE ORAL at 08:31

## 2022-01-01 RX ADMIN — EPOETIN ALFA-EPBX 4000 UNITS: 10000 INJECTION, SOLUTION INTRAVENOUS; SUBCUTANEOUS at 10:54

## 2022-01-01 RX ADMIN — DOXERCALCIFEROL 1.5 MCG: 4 INJECTION, SOLUTION INTRAVENOUS at 10:22

## 2022-01-01 RX ADMIN — HEPARIN SODIUM 5000 UNITS: 5000 INJECTION, SOLUTION INTRAVENOUS; SUBCUTANEOUS at 15:27

## 2022-01-01 RX ADMIN — GABAPENTIN 100 MG: 100 CAPSULE ORAL at 10:13

## 2022-01-01 RX ADMIN — MIDODRINE HYDROCHLORIDE 10 MG: 5 TABLET ORAL at 13:46

## 2022-01-01 ASSESSMENT — ACTIVITIES OF DAILY LIVING (ADL)
ADLS_ACUITY_SCORE: 44
ADLS_ACUITY_SCORE: 45
ADLS_ACUITY_SCORE: 41
ADLS_ACUITY_SCORE: 41
ADLS_ACUITY_SCORE: 45
ADLS_ACUITY_SCORE: 44
ADLS_ACUITY_SCORE: 34
ADLS_ACUITY_SCORE: 39
ADLS_ACUITY_SCORE: 41
ADLS_ACUITY_SCORE: 44
ADLS_ACUITY_SCORE: 39
ADLS_ACUITY_SCORE: 39
ADLS_ACUITY_SCORE: 45
ADLS_ACUITY_SCORE: 44
ADLS_ACUITY_SCORE: 45
ADLS_ACUITY_SCORE: 44
ADLS_ACUITY_SCORE: 39
ADLS_ACUITY_SCORE: 44
ADLS_ACUITY_SCORE: 44
ADLS_ACUITY_SCORE: 32
ADLS_ACUITY_SCORE: 42
ADLS_ACUITY_SCORE: 39
ADLS_ACUITY_SCORE: 42
ADLS_ACUITY_SCORE: 44
HEARING_DIFFICULTY_OR_DEAF: NO
ADLS_ACUITY_SCORE: 32
ADLS_ACUITY_SCORE: 39
ADLS_ACUITY_SCORE: 44
DIFFICULTY_EATING/SWALLOWING: NO
ADLS_ACUITY_SCORE: 41
ADLS_ACUITY_SCORE: 45
ADLS_ACUITY_SCORE: 39
ADLS_ACUITY_SCORE: 44
ADLS_ACUITY_SCORE: 39
ADLS_ACUITY_SCORE: 39
ADLS_ACUITY_SCORE: 44
ADLS_ACUITY_SCORE: 44
ADLS_ACUITY_SCORE: 32
ADLS_ACUITY_SCORE: 44
ADLS_ACUITY_SCORE: 45
CONCENTRATING,_REMEMBERING_OR_MAKING_DECISIONS_DIFFICULTY: YES
ADLS_ACUITY_SCORE: 45
ADLS_ACUITY_SCORE: 39
ADLS_ACUITY_SCORE: 44
ADLS_ACUITY_SCORE: 39
FALL_HISTORY_WITHIN_LAST_SIX_MONTHS: NO
ADLS_ACUITY_SCORE: 39
ADLS_ACUITY_SCORE: 44
ADLS_ACUITY_SCORE: 44
ADLS_ACUITY_SCORE: 30
ADLS_ACUITY_SCORE: 41
ADLS_ACUITY_SCORE: 44
ADLS_ACUITY_SCORE: 45
ADLS_ACUITY_SCORE: 44
ADLS_ACUITY_SCORE: 34
ADLS_ACUITY_SCORE: 44
ADLS_ACUITY_SCORE: 39
ADLS_ACUITY_SCORE: 39
ADLS_ACUITY_SCORE: 44
ADLS_ACUITY_SCORE: 45
ADLS_ACUITY_SCORE: 44
ADLS_ACUITY_SCORE: 41
ADLS_ACUITY_SCORE: 39
ADLS_ACUITY_SCORE: 44
ADLS_ACUITY_SCORE: 44
ADLS_ACUITY_SCORE: 45
ADLS_ACUITY_SCORE: 44
ADLS_ACUITY_SCORE: 34
ADLS_ACUITY_SCORE: 44
ADLS_ACUITY_SCORE: 44
ADLS_ACUITY_SCORE: 39
ADLS_ACUITY_SCORE: 34
ADLS_ACUITY_SCORE: 44
ADLS_ACUITY_SCORE: 41
ADLS_ACUITY_SCORE: 44
ADLS_ACUITY_SCORE: 34
WALKING_OR_CLIMBING_STAIRS_DIFFICULTY: YES
ADLS_ACUITY_SCORE: 44
WEAR_GLASSES_OR_BLIND: NO
ADLS_ACUITY_SCORE: 44
ADLS_ACUITY_SCORE: 39
ADLS_ACUITY_SCORE: 41
ADLS_ACUITY_SCORE: 39
ADLS_ACUITY_SCORE: 44
ADLS_ACUITY_SCORE: 44
ADLS_ACUITY_SCORE: 39
ADLS_ACUITY_SCORE: 44
ADLS_ACUITY_SCORE: 44
ADLS_ACUITY_SCORE: 32
ADLS_ACUITY_SCORE: 45
ADLS_ACUITY_SCORE: 44
DIFFICULTY_COMMUNICATING: NO
ADLS_ACUITY_SCORE: 45
ADLS_ACUITY_SCORE: 41
DRESSING/BATHING_DIFFICULTY: YES
DOING_ERRANDS_INDEPENDENTLY_DIFFICULTY: YES
ADLS_ACUITY_SCORE: 44
ADLS_ACUITY_SCORE: 44
ADLS_ACUITY_SCORE: 39
ADLS_ACUITY_SCORE: 44
ADLS_ACUITY_SCORE: 44
ADLS_ACUITY_SCORE: 45
ADLS_ACUITY_SCORE: 45
ADLS_ACUITY_SCORE: 39
ADLS_ACUITY_SCORE: 44
ADLS_ACUITY_SCORE: 45
ADLS_ACUITY_SCORE: 44
DRESSING/BATHING: BATHING DIFFICULTY, ASSISTANCE 1 PERSON
EQUIPMENT_CURRENTLY_USED_AT_HOME: WHEELCHAIR, POWER
ADLS_ACUITY_SCORE: 44
DEPENDENT_IADLS:: CLEANING;COOKING;LAUNDRY;MEAL PREPARATION;MEDICATION MANAGEMENT;MONEY MANAGEMENT;TRANSPORTATION;SHOPPING
ADLS_ACUITY_SCORE: 34
ADLS_ACUITY_SCORE: 39
ADLS_ACUITY_SCORE: 41
ADLS_ACUITY_SCORE: 44
ADLS_ACUITY_SCORE: 45
ADLS_ACUITY_SCORE: 44
ADLS_ACUITY_SCORE: 45
ADLS_ACUITY_SCORE: 39
ADLS_ACUITY_SCORE: 44
ADLS_ACUITY_SCORE: 39
ADLS_ACUITY_SCORE: 44
ADLS_ACUITY_SCORE: 39
ADLS_ACUITY_SCORE: 44
ADLS_ACUITY_SCORE: 44
ADLS_ACUITY_SCORE: 45
ADLS_ACUITY_SCORE: 39
ADLS_ACUITY_SCORE: 44
ADLS_ACUITY_SCORE: 45
ADLS_ACUITY_SCORE: 44
ADLS_ACUITY_SCORE: 39
ADLS_ACUITY_SCORE: 37
ADLS_ACUITY_SCORE: 39
ADLS_ACUITY_SCORE: 44
ADLS_ACUITY_SCORE: 37
ADLS_ACUITY_SCORE: 39
ADLS_ACUITY_SCORE: 44
ADLS_ACUITY_SCORE: 41
TOILETING_ISSUES: YES
ADLS_ACUITY_SCORE: 44
ADLS_ACUITY_SCORE: 44
ADLS_ACUITY_SCORE: 45
ADLS_ACUITY_SCORE: 44
ADLS_ACUITY_SCORE: 42
ADLS_ACUITY_SCORE: 41
ADLS_ACUITY_SCORE: 39
ADLS_ACUITY_SCORE: 42
ADLS_ACUITY_SCORE: 44
ADLS_ACUITY_SCORE: 39

## 2022-01-01 ASSESSMENT — ENCOUNTER SYMPTOMS
COUGH: 0
ABDOMINAL PAIN: 0
NAUSEA: 0
CHILLS: 0
FEVER: 0
CONFUSION: 1
SHORTNESS OF BREATH: 1
VOMITING: 0
SHORTNESS OF BREATH: 1
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
## 2022-02-18 NOTE — TELEPHONE ENCOUNTER
Day calling from [employer] on behalf of [facility] [employer] trying to verify dialysis treatment from April and May of 2021.  Gave number for medical records and transferred caller there.   Nicky Bradford RN   02/18/22 4:10 PM  [facility] Nurse Advisor

## 2022-04-28 NOTE — PROGRESS NOTES
[facility] Care Coordination Contact  Call from clinic VELASQUEZ Karin to discuss case.  Wondering about the appropriateness of [facility].  Also there was a referral to [facility].    Spoke to [facility] to discuss case.      Left voicemail for Crist[facility] RN requesting call back.  Batool Mai RN, BSN, PHN  [facility]  [phone]  Fax: [phone]       Yes

## 2022-06-27 PROBLEM — E87.29 HYPERCAPNIC ACIDOSIS: Status: ACTIVE | Noted: 2022-01-01

## 2022-06-27 PROBLEM — I95.9 HYPOTENSION, UNSPECIFIED HYPOTENSION TYPE: Status: ACTIVE | Noted: 2019-11-23

## 2022-06-27 NOTE — H&P
[facility]    History and Physical - Medicine Service, [facility] TEAM 1       Date of Admission:  6/27/2022    Assessment & Plan      Kim Anne is a 75 yo woman with past medical history of dementia (legal guardian Vandana, sister), ESRD 2/2 diabetic nephropathy on HD, CAD, history NSTEMI 75 y.o. female with PMH of dementia with no decision making capacity (has guardian), hypothyroidism, ESRD 2/2 diabetic nephropathy on HD, CAD and suspected NSTEMI without angiographic evaluation 6/2021, HFpEF who presents with hypotension from dialysis.    # Acute hypoxic and hypercapnic respiratory failure  # Concern for sepsis 2/2 community acquired pneumonia  # History HFpEF  # ?Submassive PE  Etiology PE vs. Possible sepsis vs. Acute decompensated HFpEF. Presented with hypotension from dialysis after having fluid removed (reported around 3 L removed, then 2 L given back, with pre and post dialysis weight 89 kg, up 8 kg from EDW of 81 kg). Hypotensive on arrival here after fluids to 86/53 and hypoxic initially requiring 4 lpm by nasal cannula with CXR showing suspicion for pulmonary edema and VBG showing hypercapnia and acidemia subsequently warranting initiation of BiPAP therapy. Was given methylprednisolone 125 mg, duoneb with suspicion that this may represent COPD exacerbation, though patient moving air well without wheezing and without prodromal symptoms to suspect this. Onset seemed more acute, and further evaluation revealed elevated BNP, mildly elevated troponin. Bedside echo with plump IVC without clear evidence of R heart strain, though with suspicion, CTPE ordered and formal echocardiogram ordered as well. Has a history of HFpEF. Do not suspect ACS with relatively flat troponin and normal EKG, suspect etiology more related to excess pulmonary fluid however would want to rule out PE first. Sepsis of course a possibility, though without clear symptoms or CXR  findings. However, with high risk, will start empiric therapy  - Diagnostic studies: CTPE with premed, echocardiogram, blood cultures, MRSA nares   - Abx: vanc/zosyn  - Empiric therapy with high intensity heparin, monitoring blood counts  - Lasix 100 mg tonight with increased shortness of breath on NC, CXR with pulmonary edema and current LE edema   - BiPAP therapy to aid with hypercapnia, likely also has underlying sleep apnea  - Trending ABGs, appear to be improving since arrival    # ESRD on HD M/W/F  EDW reported 81 kg, recent post run weights closer to 84 to 85 kg, presented here at 90.5 kg though with fluid removal in dialysis became hypotensive. Possibly vascular leakage vs. Infection vs. PE?   - Nephro consult for HD  - Dialyzes MWF through RUE fistula  - Does make some urine, will attempt lasix for suspected pulmonary edema  - Was given fluids back in dialysis today, was not symptomatic with hypotension, though current concern for hypervolemia    # Hx CAD  Low concern for ACS  - PTA atorvastatin  - PTA ASA    # Chronic macrocytic anemia  # reported history GI bleed?  Presented at hgb 8.5, consistent with chronic anemia. Prior B12 and folate levels wnl, likely has chronic disease component. Reported history of GI bleeding though not definitive, patient denies though history unreliable, will monitor while on heparin.   - PTA PPI     # COPD  - continue PTA LAMA/LABA/ICS therapy with albuterol prn     # Hypothyroidism  - Continue PTA levothyroxine 175 mcg daily      # Dementia  Alternative decision maker is sister, Vandana. Lives with her at home.     Diet: Renal Diet (dialysis)    DVT Prophylaxis: heparin therapeutic   Chaney Catheter: Not present  Fluids: none  Central Lines: None  Cardiac Monitoring: ACTIVE order. Indication: Electrolyte Imbalance (24 hours)- Magnesium <1.3 mg/ml; Potassium < =2.8 or > 5.5 mg/ml  Code Status: Full Code      Clinically Significant Risk Factors Present on Admission         #  "Hyponatremia: Na = 130 mmol/L (Ref range: 136 - 145 mmol/L) on admission, will monitor as appropriate        # Platelet Defect: home medication list includes an antiplatelet medication     # Cachexia: Estimated body mass index is 33.2 kg/m  as calculated from the following:    Height as of 2/25/20: 1.651 m (5' 5\").    Weight as of this encounter: 90.5 kg (199 lb 8.3 oz).        Disposition Plan         Staffed with Dr. Adam Franz MD  Medicine Service, [address]  Securely message with the Vocera Web Console (learn more here)  Text page via [facility] Paging/Directory   Please see signed in provider for up to date coverage information    ______________________________________________________________________    Chief Complaint   Hypotension, lethargy    History is obtained from the patient    History of Present Illness   Kim Anne is a 75 yo woman with past medical history of dementia (legal guardian Vandana, sister), ESRD 2/2 diabetic nephropathy on HD, CAD, history NSTEMI 75 y.o. female with PMH of dementia with no decision making capacity (has guardian), hypothyroidism, ESRD 2/2 diabetic nephropathy on HD, CAD and suspected NSTEMI without angiographic evaluation 6/2021, HFpEF who presents with hypotension from dialysis.    Patient reports feeling normal state of health in past few days and leading up to dialysis today. Was dialyzing with apparently goal for 3 L fluid removal, became hypotensive to systolics 60s/70s, asymptomatic without any reported SOB or lightheadedness. Given 2 L IVF back and sent to ED, hypotensive on arrival here to 86/53, hypoxic on NC 4lpm and thus put on Bipap. On my exam, was not tachypneic after removign biPAP and with 1 L NC was satting above 90%. Moved to floor where she became more tachypneic, CXR with bibasilar opacities. No new or recent cough, abdominal pain, loose stools, headaches. Does report " intermittent chest pressure while lying in bed, in line with her tachypnea. Does make some urine, no new urinary symptoms.    Given 125 mg solumedrol in ED, duoneb, blood pressure improved to 120/50s, put on BiPAP at 12/5 and was resting comfortably upon initial examination until above descriptive changes after going to the floor.     Review of Systems    ROS negative other than noted in HPI    Past Medical History    reviewed    Past Surgical History   reviewed    Social History   Lives with sister, Vandana at home. No alcohol use. 4-5 cigarettes/day    Family History   I have reviewed this patient's family history and updated it with pertinent information if needed.  Family History   Problem Relation Age of Onset     Diabetes Mother         brother, MGM, sister     Kidney Disease Brother         X2 DM two      Alcohol/Drug Child         daughter     Alcoholism Son      Diabetes Son      Asthma No family hx of      C.A.D. No family hx of      Hypertension No family hx of      Cerebrovascular Disease No family hx of      Breast Cancer No family hx of      Cancer - colorectal No family hx of      Prostate Cancer No family hx of      Allergies No family hx of      Alzheimer Disease No family hx of      Anesthesia Reaction No family hx of      Arthritis No family hx of      Blood Disease No family hx of      Cancer No family hx of      Cardiovascular No family hx of      Circulatory No family hx of      Congenital Anomalies No family hx of      Connective Tissue Disorder No family hx of      Depression No family hx of      Eye Disorder No family hx of      Genetic Disorder No family hx of      Gastrointestinal Disease No family hx of      Genitourinary Problems No family hx of      Gynecology No family hx of      Heart Disease No family hx of      Lipids No family hx of      Musculoskeletal Disorder No family hx of      Neurologic Disorder No family hx of      Obesity No family hx of      Osteoporosis No family hx  of      Psychotic Disorder No family hx of      Respiratory No family hx of      Thyroid Disease No family hx of      Glaucoma No family hx of      Macular Degeneration No family hx of        Prior to Admission Medications   Prior to Admission Medications   Prescriptions Last Dose Informant Patient Reported? Taking?   ASPIR-LOW 81 MG EC tablet   No No   Sig: Take 1 tablet (81 mg) by mouth At Bedtime   Emollient (EUCERIN CALMING DAILY MOIST) CREA   No No   Sig: Externally apply 1 dose. topically daily   albuterol (PROAIR HFA/PROVENTIL HFA/VENTOLIN HFA) 108 (90 Base) MCG/ACT inhaler   No No   Sig: Inhale 2 puffs into the lungs every 6 hours as needed for shortness of breath / dyspnea or wheezing   ammonium lactate (LAC-HYDRIN) 12 % external lotion   No No   Sig: Apply topically 2 times daily   atorvastatin (LIPITOR) 40 MG tablet   No No   Sig: Take 1 tablet (40 mg) by mouth At Bedtime   blood glucose monitoring (FREESTYLE LITE) test strip   No No   Sig: TEST BLOOD SUGAR TWO TIMES A DAY   blood glucose monitoring (FREESTYLE) lancets   No No   Sig: Test BS two times daily as directed   clopidogrel (PLAVIX) 75 MG tablet   No No   Sig: Take 1 tablet (75 mg) by mouth daily   fluticasone-salmeterol (ADVAIR) 250-50 MCG/DOSE inhaler   No No   Sig: Inhale 1 puff into the lungs every 12 hours   levothyroxine (SYNTHROID/LEVOTHROID) 175 MCG tablet   No No   Sig: Take 1 tablet (175 mcg) by mouth every morning (before breakfast)   midodrine (PROAMATINE) 10 MG tablet   No No   Sig: Take 10 mg (one tab) before receiving hemodialysis   multivitamin RENAL (NEPHROCAPS/TRIPHROCAPS) 1 MG capsule   No No   Sig: Take 1 capsule by mouth daily   nitroGLYcerin (NITROSTAT) 0.4 MG sublingual tablet   No No   Sig: Place 1 tablet (0.4 mg) under the tongue every 5 minutes as needed for chest pain   order for DME   No No   Sig: One wheeled walker with seat and brakes and basket   order for DME   No No   Sig: Equipment being ordered: Compression  socks.  Strength:15-20 mmHg   order for DME   No No   Sig: Equipment being ordered: Diabetic Shoes   order for DME   No No   Sig: Equipment being ordered: two pairs moderate knee high support hose   order for DME   No No   Sig: Equipment being ordered: cane   order for DME   No No   Sig: Equipment being ordered: Boost.   order for DME   No No   Sig: Equipment being ordered: Nebulizer   pantoprazole (PROTONIX) 40 MG EC tablet   No No   Sig: Take 1 tablet (40 mg) by mouth 2 times daily   polyethylene glycol (MIRALAX) packet   No No   Sig: Take 17 g by mouth daily as needed for constipation   tiotropium (SPIRIVA) 18 MCG inhaled capsule   No No   Sig: Inhale 1 capsule (18 mcg) into the lungs daily   vitamin D3 (CHOLECALCIFEROL) 2000 units (50 mcg) tablet   No No   Sig: Take 1 tablet (2,000 Units) by mouth daily      Facility-Administered Medications: None     Allergies   Allergies   Allergen Reactions     Contrast Dye Hives and Itching     Clonidine      She had as IP and thinks it made her itchy     Diatrizoate Other (See Comments)     Diltiazem      Severe bradycardia     Iodine-131        Physical Exam   Vital Signs: Temp: 97.8  F (36.6  C) Temp src: Oral BP: 121/58 Pulse: 63   Resp: 28 SpO2: 99 % O2 Device: Nasal cannula Oxygen Delivery: 2 LPM  Weight: 199 lbs 8.26 oz    PHYSICAL EXAM:    General: Somewhat tachypneic off BiPAP, alert  HEENT: no scleral icterus  CV: RRR, holosystolic murmur heard over left upper sternal border, 1+ bilateral LE edema   Pulmonary: crackles bilaterally, worse in R base, some expiratory wheezes  GI: soft  SKIN: Overall warm and dry. No rashes or bruises noted.  Neuro: moving all extremities, upper and lower extremity strength 5/5, thought was at [facility], thought July 2022    Data   Data reviewed today: I reviewed all medications, new labs and imaging results over the last 24 hours.

## 2022-06-27 NOTE — PHARMACY-VANCOMYCIN DOSING SERVICE
"Pharmacy Vancomycin Initial Note  Date of Service 2022  Patient's  1945  76 year old, female    Indication: Community Acquired Pneumonia, sepsis    Current estimated CrCl = Estimated Creatinine Clearance: 22.2 mL/min (A) (based on SCr of 2.4 mg/dL (H)).    Creatinine for last 3 days  2022: 11:28 AM Creatinine 2.22 mg/dL;  4:12 PM Creatinine 2.40 mg/dL    Recent Vancomycin Level(s) for last 3 days  No results found for requested labs within last 72 hours.      Vancomycin IV Administrations (past 72 hours)      No vancomycin orders with administrations in past 72 hours.                Nephrotoxins and other renal medications (From now, onward)    Start     Dose/Rate Route Frequency Ordered Stop    22 1900  furosemide (LASIX) injection 100 mg         100 mg  over 1-3 Minutes Intravenous ONCE 22 1828      22 1900  piperacillin-tazobactam (ZOSYN) 2.25 g vial to attach to  ml bag        Note to Pharmacy: For [facility], [facility] and [facility]: For Zosyn-naive patients, use the \"Zosyn initial dose + extended infusion\" order panel.    2.25 g  over 30 Minutes Intravenous EVERY 6 HOURS 22 1841            Contrast Orders - past 72 hours (72h ago, onward)    None              Plan:  1. Start vancomycin 1750 mg (19 mg/kg) IV x1.   2. Vancomycin monitoring method: Renal Replacement Therapy  3. Vancomycin therapeutic monitoring goal: 15-20 mg/L  4. Pharmacy will check vancomycin levels as appropriate based on hemodialysis schedule.  5. Serum creatinine levels will be ordered daily for the first week of therapy and at least twice weekly for subsequent weeks.      Nicki Bernal, PharmD, BCPS  "

## 2022-06-27 NOTE — PROVIDER NOTIFICATION
Time of notification: 4:52 PM  Provider notified: Jellywilder  Patient status: Increased SOB, CP, EKG ordered.  Temp:  [96.3  F (35.7  C)-97.8  F (36.6  C)] 97.8  F (36.6  C)  Pulse:  [] 64  Resp:  [11-28] 28  BP: ()/(52-69) 112/64  SpO2:  [94 %-100 %] 99 %  Orders received: MD to assess at bedside, IV lasix ordered    Time of notification: 11:20PM  Patient status: Anxious, taking off bipap, calling out.  Temp:  [96.3  F (35.7  C)-97.8  F (36.6  C)] 97.8  F (36.6  C)  Pulse:  [] 75  Resp:  [11-28] 26  BP: ()/(52-69) 121/67  SpO2:  [82 %-100 %] 96 %  Orders received:  MD assessed at bedside, ativan ordered.

## 2022-06-27 NOTE — ED PROVIDER NOTES
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 diastolic  dysfunction. Diastolic Doppler findings (E/E' ratio and/or other parameters)  suggest left ventricular filling pressures are increased. No regional wall  motion abnormalities are seen.     Right Ventricle  The right ventricle is normal size. The RV function is low-normal.     Atria  The right atria appears normal. Mild left atrial enlargement is present.     Mitral Valve  Mild mitral annular calcification is present. Mild mitral insufficiency is  present.     Aortic Valve  The aortic valve is tricuspid. Mild aortic valve calcification is present. On  Doppler interrogation, there is no significant stenosis or regurgitation.     Tricuspid Valve  Moderate to severe tricuspid insufficiency is present. RVSP estimated at  atleast 84mmHg, consistent with severe Pulmonary HTN.     Pulmonic Valve  Mild pulmonic insufficiency is present.     Vessels  Sinuses of Valsalva 3.6 cm. Ascending aorta 2.7 cm. IVC diameter >2.1 cm  collapsing <50% with sniff suggests a high RA pressure estimated at 15 mmHg or  greater.     Pericardium  No pericardial effusion is present.     Compared to Previous Study  This study was compared with the study from 6/3/2021 . The PA systolic  pressure has  increased and the tricuspid regurgitation has increased modestly  in severity. The RV function is similar.  ______________________________________________________________________________  MMode/2D Measurements & Calculations  RVDd: 3.8 cm  IVSd: 1.2 cm  LVIDd: 4.0 cm  LVIDs: 2.0 cm  LVPWd: 1.2 cm  FS: 51.4 %  LV mass(C)d: 170.7 grams  LV mass(C)dI: 86.3 grams/m2  Ao root diam: 3.6 cm  asc Aorta Diam: 2.7 cm  LVOT diam: 1.7 cm  LVOT area: 2.3 cm2  LA Volume (BP): 77.6 ml     LA Volume Index (BP): 39.2 ml/m2  RWT: 0.61     Doppler Measurements & Calculations  MV E max laberto: 133.0 cm/sec  MV A max alberto: 52.6 cm/sec  MV E/A: 2.5  MV dec slope: 830.0 cm/sec2  TR max alberto: 372.0 cm/sec  TR max P.1 mmHg  E/E' av.8  Lateral E/e': 38.2  Medial E/e': 19.4     ______________________________________________________________________________  Report approved by: Enrique Rodas 2022 01:21 PM         Glucose by meter   Result Value Ref Range    GLUCOSE BY METER POCT 177 (H) 70 - 99 mg/dL     *Note: Due to a large number of results and/or encounters for the requested time period, some results have not been displayed. A complete set of results can be found in Results Review.     Medications   calcium gluconate 10 % injection 1 g (0 g Intravenous Hold 22 1129)   sodium chloride (PF) 0.9% PF flush 3 mL (has no administration in time range)   sodium chloride (PF) 0.9% PF flush 3 mL (3 mLs Intravenous Given 22 1528)   midodrine (PROAMATINE) tablet 10 mg (10 mg Oral Given 22 1322)   ondansetron (ZOFRAN) injection 4 mg (has no administration in time range)   lidocaine 1 % 0.1-1 mL (has no administration in time range)   lidocaine (LMX4) cream (has no administration in time range)   sodium chloride (PF) 0.9% PF flush 3 mL (3 mLs Intracatheter Not Given 22 1529)   sodium chloride (PF) 0.9% PF flush 3 mL (has no administration in time range)   melatonin tablet 1 mg (has no administration in time range)    Patient is already receiving anticoagulation with heparin, enoxaparin (LOVENOX), warfarin (COUMADIN)  or other anticoagulant medication (has no administration in time range)   acetaminophen (TYLENOL) tablet 650 mg (650 mg Oral Given 6/27/22 2110)   diphenhydrAMINE (BENADRYL) capsule 25-50 mg (has no administration in time range)     Or   diphenhydrAMINE (BENADRYL) injection 25-50 mg (has no administration in time range)   aspirin EC tablet 81 mg (81 mg Oral Given 6/27/22 2106)   atorvastatin (LIPITOR) tablet 40 mg (40 mg Oral Given 6/27/22 2106)   fluticasone-vilanterol (BREO ELLIPTA) 100-25 MCG/INH inhaler 1 puff (1 puff Inhalation Given 6/28/22 0803)   levothyroxine (SYNTHROID/LEVOTHROID) tablet 175 mcg (175 mcg Oral Given 6/28/22 0802)   pantoprazole (PROTONIX) EC tablet 40 mg (40 mg Oral Given 6/28/22 0803)   umeclidinium (INCRUSE ELLIPTA) 62.5 MCG/INH inhaler 1 puff (1 puff Inhalation Given 6/28/22 0804)   No lozenges or gum should be given while patient on BIPAP/AVAPS/AVAPS AE (has no administration in time range)   carboxymethylcellulose PF (REFRESH PLUS) 0.5 % ophthalmic solution 1 drop (has no administration in time range)   Patient may continue current oral medications (has no administration in time range)   acetylcysteine (MUCOMYST) 10 % nebulizer solution 4 mL (4 mLs Nebulization Not Given 6/28/22 1320)   ampicillin-sulbactam (UNASYN) 3 g vial to attach to  mL bag (has no administration in time range)   benztropine (COGENTIN) tablet 1 mg (has no administration in time range)   QUEtiapine (SEROquel) half-tab 12.5 mg (has no administration in time range)   QUEtiapine (SEROquel) tablet 25 mg (has no administration in time range)   albuterol (PROVENTIL) neb solution 2.5 mg (2.5 mg Nebulization Not Given 6/28/22 1321)   heparin ANTICOAGULANT injection 5,000 Units (5,000 Units Subcutaneous Given 6/28/22 1527)   Patient may continue current oral medications (has no administration in time range)    ipratropium - albuterol 0.5 mg/2.5 mg/3 mL (DUONEB) neb solution 3 mL (3 mLs Nebulization Given 6/27/22 1157)   methylPREDNISolone sodium succinate (solu-MEDROL) injection 125 mg (125 mg Intravenous Given 6/27/22 1157)   heparin ANTICOAGULANT Loading dose for HIGH INTENSITY TREATMENT * Give BEFORE starting heparin infusion (7,250 Units Intravenous Given 6/27/22 1532)   methylPREDNISolone sodium succinate (solu-MEDROL) injection 40 mg (40 mg Intravenous Given 6/27/22 2150)   diphenhydrAMINE (BENADRYL) injection 50 mg (50 mg Intravenous Given 6/27/22 2303)   furosemide (LASIX) injection 100 mg (100 mg Intravenous Given 6/27/22 1918)   vancomycin (VANCOCIN) 1,750 mg in sodium chloride 0.9 % 500 mL intermittent infusion (1,750 mg Intravenous New Bag 6/27/22 2152)   LORazepam (ATIVAN) tablet 0.5 mg (0.5 mg Oral Given 6/27/22 2349)   LORazepam (ATIVAN) tablet 0.5 mg (0.5 mg Oral Given 6/28/22 0036)   iopamidol (ISOVUE-370) solution 67 mL (67 mLs Intravenous Given 6/28/22 0144)   sodium chloride (PF) 0.9% PF flush 96 mL (96 mLs Intravenous Given 6/28/22 0144)   QUEtiapine (SEROquel) half-tab 12.5 mg (12.5 mg Oral Given 6/28/22 0422)   0.9% sodium chloride BOLUS (250 mLs Intravenous New Bag 6/28/22 1108)   0.9% sodium chloride BOLUS (300 mLs Hemodialysis Machine New Bag 6/28/22 1108)             Assessments & Plan (with Medical Decision Making)   Kim Anne is a 76 year old female with a past medical history significant for COPD, diabetes mellitus type 2, CKD stage IV, single kidney, dialysis patient (M, W, F), hypotension of hemodialysis, NSTEMI, pyelonephritis, and peripheral vascular disease who presents to the Emergency Department coming from [facility] for evaluation of altered mental status and low blood pressure.    Ddx: Sepsis, pneumonia, COPD exacerbation, acute on chronic hypercarbic respiratory failure, CO2 narcosis, viral URI, COVID, flash pulmonary edema, volume overload, PE, ACS, demand  NSTEMI      Per review of records from weight when she started.  Her weight after nearly completing her dialysis run was higher than when she started.  This corresponds with the liter of fluid she was given for low blood pressure.  I did not see any significantly low blood pressures charted on her dialysis report.  This is inconsistent with the report that the medics gave that she had just barely started dialysis.  They gave her an additional liter of fluid during transport.  On arrival the patient is lethargic but only responds to questions appropriately although she thought she was at [facility].  Her map is greater than 65.  We will hold any additional fluids.  Point-of-care potassium with potassium of 4.  Afebrile.  Lactate normal.  Troponin 113.  Patient not complaining of chest pain or shortness of breath.  Her oxygen has been normal on room air.  Hemoglobin 7.7 which is close to her baseline.  White count normal.  Blood gas shows pH of 7.25 with PCO2 of 72.  Bicarb 32.  This is consistent with acute on chronic respiratory acidosis.  The hypercapnia may have something to do with patients lethargy.  She was on 4 L oxygen via nasal cannula by EMS when she arrived.  However she did not have any hypoxia so she was taken off oxygen and satting appropriately on room air.  I ordered BiPAP to start to help treat patient's acidosis.  She has a history of COPD so this may be an acute exacerbation.  Given methylprednisolone and we will give her a DuoNeb if her COVID returns negative.  Procalcitonin 0.44.  Point-of-care ultrasound shows hyperdynamic LV with significant LVH.  Suspect diastolic dysfunction.  IVC is plethoric without respiratory variation.  No pericardial effusion.  Chest x-ray shows trace pleural effusions.  No pneumothorax.  Indistinct pulmonary vasculature and diffuse perihilar interstitial opacities.  No focal consolidation.  It was interpreted as borderline cardiomegaly with interstitial  pulmonary edema.  This this may be acute pulmonary edema from the 2 L of fluid that she received prior to arrival.    Added on a BNP and D-dimer.    CT head obtained to evaluate for bleed in the setting of altered mental status/lethargy.  This resulted without Acute abnormality.    Patient admitted to medicine on intermediate level care for ongoing management.    I have reviewed the nursing notes.    I have reviewed the findings, diagnosis, plan and need for follow up with the patient.    Current Discharge Medication List          Final diagnoses:   Hypercapnic acidosis   Hypotension, unspecified hypotension type     I, Stephanie Mccarthy, am serving as a trained medical scribe to document services personally performed by Angelika Durán MD, based on the provider's statements to me.      I, Angelika Durán MD, was physically present and have reviewed and verified the accuracy of this note documented by Stephanie Mccarthy.    Angelika Durán MD  6/27/2022   [facility] EMERGENCY DEPARTMENT     Angelika Druán MD  06/28/22 9687

## 2022-06-27 NOTE — ED NOTES
Bed: ED03  Expected date:   Expected time:   Means of arrival:   Comments:  Cathie 79F BP 68/27 AMS, 93% on 4LNC, pulse 65, gave atropine

## 2022-06-27 NOTE — ED TRIAGE NOTES
biba from dialysis  Became lethargic immediately after starting  BP 60/40  Dialysis stopped and 2,000mL IVF given PTA  SB 40, given 1mg atrpine

## 2022-06-27 NOTE — PROGRESS NOTES
Admission          6/27/2022 15:00 PM  -----------------------------------------------------------  Reason for admission: Hypoxia, SOB  Primary team notified of pt arrival.  Admitted from: ED  Via: stretcher  Accompanied by: self, updated sister Vandana Jiménez.  Belongings: Placed in closet.  Admission Profile: complete  Teaching: orientation to unit and call light- call light within reach, call don't fall, use of console, meal times, when to call for the RN, and enforced importance of safety   Access: PIV  Telemetry:Placed on pt  Ht./Wt.: complete  Code Status verified on armband: yes  2 RN Skin Assessment Completed By:  Erick  Med Rec completed: yes  New bed surface ordered: no  Suction/Ambu bag/Flowmeter at bedside: yes  Is patient having diarrhea upon admission- if YES fill out testing algorithm : yes/no    C. Diff Testing Algorithm (MUST be marked YES)   3 or more loose stools in 24 hrs. [] Yes [x] No       Additional symptoms:(At least ONE must be marked yes)   Abdominal pain/discomfort [] Yes [] No   Fever at least 38C (100.4 F) [] Yes [] No   Elevated WBC(>11,000) [] Yes [] No       Exclusion Criteria:  (MUST be marked YES)   Off laxatives for at least 48 hrs. [] Yes [] No       Pt status: Pt alert, VSS.    Temp:  [96.3  F (35.7  C)-97.3  F (36.3  C)] 97.3  F (36.3  C)  Pulse:  [] 60  Resp:  [11-24] 24  BP: ()/(52-69) 110/55  SpO2:  [94 %-100 %] 99 %

## 2022-06-28 NOTE — PROGRESS NOTES
Neuro: Alert, oriented to self only  Cardiac: Pt between Sinus Tachy and Sinus Lucio through the night.   Respiratory: Sating 90-96% on 2L oxymask. Patient removing mask throughout the shift, had to educate patient on leaving mask on.   GI/: Dialysis patient. No urine output this shift. No BM overnight  Diet/appetite: Advance diet as tolerated.   Activity:  Assist of 2. Pt not out of bed overnight  Pain: At acceptable level on current regimen.   Skin: New bruising to left forearm due to new IV access placements and IV's being pulled by patient overnight  LDA's: Left PIV, saline locked    Plan: Patient had CT to rule out PE overnight. No PE found. Heparin Driip stopped.   Patient pulling at IV lines, pulled two Iv's out, even after mitts were placed. Hgb dropped to 6.9. Team decided not to transfuse and to recheck Hgb later in the day.   Patient received two 0.5mg doses of ativan and one 12.5mg dose of Seroquel overnight, these did not seem to help calm the patient. She continued to complain of Shortness of breath, was restless and agitated. Team was aware.     Continue with POC. Notify primary team with changes.

## 2022-06-28 NOTE — PROGRESS NOTES
Resident/Fellow Attestation   I, Scar Franz MD, was present with the medical/JILLIAN student who participated in the service and in the documentation of the note.  I have verified the history and personally performed the physical exam and medical decision making.  I agree with the assessment and plan of care as documented in the note.      Scar Franz MD  PGY2  Date of Service (when I saw the patient): 06/28/22    [facility]    Progress Note - Medicine Service, [facility] TEAM 1       Date of Admission:  6/27/2022    Assessment & Plan            Kim Anne is a 76 year old female admitted on 6/27/2022. She has a history of dementia (legal dax Ross, sister), ESRD 2/2 diabetic nephropathy on HD, CAD, history of NSTEMI, HFpEF and is admitted for hypotension from dialysis with acute hypoxic and hypercapnic respiratory failure.     Updates:  - On oxymask/nasal cannula 2-3 lpm  - Heparin stopped, subcutaneous ppx started (no PE)  - Echocardiogram today  - Hgb borderline, suspect some dilutional component, oozing with heparin, repeating and transfusing as appropriate  - Narrowed abxs from vanc/zosyn to unasyn  - Blood cultures 2/2 streptococcus, repeat today  - CT with pulmonary edema and signs of mucus plugging, possible aspiration pneumonia  - Metaneb and mucomyst with albuterol nebs to help with airway clearance  - Seroquel scheduled at bedtime, delirium precautions  - BiPAP at night     # Acute hypoxic and hypercapnic respiratory failure-Improving  Etiology is likely to be secondary to CAP and/or volume overload in setting of HFpEF and ESRD. CAP/sepsis is more likely as blood culture showed gram positive cocci in pairs and chains, streptococcus. CT with evidence of mucus plugging/possible aspiration pneumonia. HFpEF is also possible as she presented with pulmonary edema and lower extremity edema. ECHO results are pending, but she feels that her  swelling has improved overnight. PE is very unlikely as the CT was negative for any evidence of PE.  - BiPAP therapy to aid with hypercapnia, likely also has underlying sleep apnea  - Metaneb and mucomyst with albuterol nebs to help with airway clearance  - Awaiting echo results  - Heparin stopped, on subcutaneous  - Trending ABGs, appear to be improving since arrival  - Abxs as below     # Streptococcus Bacteremia   # Concern for sepsis 2/2 community acquired pneumonia vs. Aspiration pneumonia  ABx: Unasyn   Blood culture returned positive for gram positive cocci in pairs and chains  Virogen returned positive for streptococcus species  Blood Culture f/u  Repeat Blood culture tomorrow (6/29)  Add vancomycin if patient decompensates or source presents itself  MRSA nares: negative    # History HFpEF  Echo follow up for HFpEF evaluation    # ESRD on HD M/W/F  EDW reported 81 kg, recent post run weights closer to 84 to 85 kg, presented here at 90.5 kg though with fluid removal in dialysis became hypotensive.  - Nephro consult for HD  - Dialyzes MWF through RUE fistula  - Does make some urine  - Underwent dialysis today. 1.5 mL removed, with episode of hypotension, improved with Midodrine 15 mg     # Hx CAD  Low concern for ACS  - PTA atorvastatin  - PTA ASA     # Chronic macrocytic anemia  # reported history GI bleed?  Presented at hgb 8.5, consistent with chronic anemia. Prior B12 and folate levels wnl, likely has chronic disease component. Reported history of GI bleeding though not definitive, patient denies though history unreliable, will monitor while on heparin. HGB dropped to 6.9 and remains stable at 7.1  - PTA PPI  - CBC f/u, transfuse if hgb<7  - Iron Studies f/u      # COPD  - continue PTA LAMA/LABA/ICS therapy with albuterol prn  - consulted respiratory therapy for COPD education and optimization of therapy     # Hypothyroidism  - Continue PTA levothyroxine 175 mcg daily      # Dementia  Alternative decision  "maker is sister, Vandaan. Lives with her at home.  Update Vandana daily with plan  Seroquel at bedtime to help with delirium  Delirium precautions in place        Diet: Renal Diet (dialysis)    DVT Prophylaxis: Heparin subcutaneous  Chaney Catheter: Not present  Fluids: none  Central Lines: None  Cardiac Monitoring: ACTIVE order. Indication: Electrolyte Imbalance (24 hours)- Magnesium <1.3 mg/ml; Potassium < =2.8 or > 5.5 mg/ml  Code Status: Full Code      Disposition Plan      Expected Discharge Date: 07/05/2022                The patient's care was discussed with the Attending Physician, Dr. Greenberg.    Delbert Castro  Medical Student  Medicine Service, [address]  Securely message with the Vocera Web Console (learn more here)  Text page via [facility] Paging/Directory   Please see signed in provider for up to date coverage information      Clinically Significant Risk Factors Present on Admission               # Obesity: Estimated body mass index is 33.57 kg/m  as calculated from the following:    Height as of this encounter: 1.651 m (5' 5\").    Weight as of this encounter: 91.5 kg (201 lb 11.5 oz).        ______________________________________________________________________    Interval History   Kim had some shortness of breath overnight. She also felt agitated and pulled out two IVs leading to bruising in her left forearm. She received Ativan and Seroquel, but she reported that it did not help much. She received BiPAP. She had poor sleep and felt tired this morning. Kim had no oral intake overnight, but was able to clear 2 hours off BiPAP and is planning to eat food and take medication orally.     While at dialysis was comfortable on 2 lpm nasal cannula oxygen satting upper 90s.    Data reviewed today: I reviewed all medications, new labs and imaging results over the last 24 hours. I personally reviewed   Physical Exam   Vital Signs: Temp: 97.7  F " (36.5  C) Temp src: Axillary BP: (!) 84/47 Pulse: 57   Resp: 8 SpO2: 94 % O2 Device: Oxymask Oxygen Delivery: 1 LPM  Weight: 201 lbs 11.53 oz  Constitutional: Sitting in bed with 2L of oxygen mask in place, appears tired  Eyes: No scleral icterus   ENT: atraumatic, without obvious abnormality  Respiratory: good air exchange, diffuse bilateral crackles bilaterally  Cardiovascular:  regular rate and rhythm and no murmur noted  GI: soft, non-distended, non-tender  Skin: LE abrasions, appear dry and healing well. Bruises on left forearm near previous IV placement  Musculoskeletal: Trace lower extremity edema. Able to roll on side for respiratory exam  Neurologic: slightly somnelent, alert, oriented to name and place. Says it is July 2022 for time.   Data   Recent Labs   Lab 06/28/22  1342 06/28/22  0841 06/28/22  0454 06/27/22  1612 06/27/22  1527 06/27/22  1128   WBC  --   --  5.5 5.0 5.1 4.6   HGB  --  7.1* 6.9* 8.6* 8.5* 7.7*   MCV  --   --  111* 108* 109* 111*   PLT  --   --  232 257 246 248   NA  --   --  132* 130*  --  132*   POTASSIUM  --   --  5.3 4.9  --  4.0   CHLORIDE  --   --  96* 94*  --  96*   CO2  --   --  25 26  --  29   BUN  --   --  32.1* 24.8*  --  21.8   CR  --   --  2.81* 2.40*  --  2.22*   ANIONGAP  --   --  11 10  --  7   FRANCES  --   --  8.0* 8.7*  --  7.8*   *  --  144* 130*  --  124*   ALBUMIN  --   --   --  3.6  --  3.0*   PROTTOTAL  --   --   --  6.9  --  6.1*   BILITOTAL  --   --   --  0.5  --  0.4   ALKPHOS  --   --   --  312*  --  346*   ALT  --   --   --  13  --  14   AST  --   --   --  37*  --  23     Recent Results (from the past 24 hour(s))   XR Chest Port 1 View    Narrative    EXAM: XR CHEST PORT 1 VIEW  6/27/2022 6:41 PM     HISTORY:  new worsening hypoxia/SOB       COMPARISON:  Radiograph earlier same day.    TECHNIQUE: AP view the chest at 60 degrees    FINDINGS:     Vascular stent projecting over the left superior mediastinum. The  trachea is midline. The cardiomediastinal  silhouette is stable. The  pulmonary vasculature is indistinct.  No appreciable pneumothorax or  focal consolidative opacity. Possible trace bilateral pleural  effusions. No acute osseous abnormality.        Impression    IMPRESSION:   1. Stable findings suggestive of pulmonary interstitial edema with  possible trace bilateral pleural effusions.  2. No new focal airspace opacity.    I have personally reviewed the examination and initial interpretation  and I agree with the findings.    SUJATHATINA CASON DO RACH         SYSTEM ID:  [id number]   CT Chest Pulmonary Embolism w Contrast    Narrative    EXAMINATION: CTA pulmonary angiogram, 6/28/2022 2:03 AM     COMPARISON: Chest x-ray 6/27/2022, CT chest abdomen pelvis 11/23/2019    HISTORY: ESRD on hemodialysis with hypotension and hypoxia.    TECHNIQUE: Volumetric helical acquisition of CT images of the chest  from the lung apices to the kidneys were acquired after the  administration of 80 mL of Isovue-370 IV contrast. Post-processed  multiplanar and/or MIP reformations were obtained, archived to PACS  and used in interpretation of this study. Two boluses of contrast were  utilized due to infiltration on the first scanning attempt.    FINDINGS:      Contrast bolus is: excellent.  Exam is negative for acute pulmonary  embolism. Enlarged main pulmonary artery up to 3.3 cm.    Remaining chest: Cardiomegaly. No significant pericardial effusion.  Moderate coronary artery calcifications. Normal caliber thoracic aorta  with atherosclerosis. Occluded left subclavian artery stent, as seen  on 5/25/2019. Scattered mediastinal and hilar lymphadenopathy, likely  reactive. For example, 10 mm right hilar node (series 7, image 47 and  a 13 mm left hilar node (series 7, image 41); scattered 10 to 11 mm  right paratracheal nodes (series 7, image 21, 27); 11 mm periaortic  node (series 9, image 74). Patulous esophagus with mild esophageal  wall thickening.     Scattered posterior debris in the  distal trachea and left mainstem  bronchus. Bronchial wall thickening. Diffuse smooth interlobular  septal thickening. Scattered bronchocentric groundglass opacities and  few nodular opacities. Expiration phase imaging. Mucous plugging in  the lower lobes. Bronchovascular thickening.    No pneumothorax. Small bilateral pleural effusions with associated  atelectatic changes.    Upper abdomen: Ascites. Atrophic pancreas. Aortic atherosclerosis.    Bones/Soft Tissues: Degenerative changes in the visualized spine. No  acute osseous abnormalities or suspicious bony lesions. Chronic  appearing right posterior rib fracture deformities. Anasarca.      Impression    IMPRESSION:   1. No acute pulmonary embolism.  2. Cardiomegaly with pulmonary edema and small bilateral pleural  effusions.  3. Scattered nodular opacities, suspicious for infection, although  there is lower lobe mucous plugging and debris in the central airway  suggesting some degree of likely aspiration.  4. Occluded left subclavian artery stent.  5. Ascites and anasarca.  6. Mediastinal and hilar lymphadenopathy. Recommend attention on  follow-up.      In the event of a positive result for acute pulmonary embolism  resulting in right heart strain, consider calling the   [facility]  for PERT (Pulmonary Embolism Response Team)  Activation?    PERT -- Pulmonary Embolism Response Team (Multidisciplinary team  including cardiology, interventional radiology, critical care,  hematology)    I have personally reviewed the examination and initial interpretation  and I agree with the findings.    CALE PACK MD         SYSTEM ID:  [id number]   Echocardiogram Complete   Result Value    LVEF  55-60%    Narrative    [id number]  [id number]  [id number]^FARHAT^SELINA^A     [facility],[place]  Echocardiography Laboratory  [address] [place] [id number]     Name: JONEL CHAVEZ  MRN: [id number]  : 1945  Study  Date: 06/28/2022 09:43 AM  Age: 76 yrs  Gender: Female  Patient Location: [facility]  Reason For Study: Dyspnea  Ordering Physician: SELINA DOUGHERTY  Performed By: Stanislaw Lozada     BSA: 2.0 m2  Height: 65 in  Weight: 200 lb  ______________________________________________________________________________  Procedure  Echocardiogram with two-dimensional, color and spectral Doppler performed.  ______________________________________________________________________________  Interpretation Summary  Global and regional left ventricular function is normal with an EF of 55-60%.  Grade III or advanced diastolic dysfunction with relative wall thickness is  increased consistent with concentric remodeling.  The RV function is low-normal. The right ventricle is normal size.  Moderate to severe tricuspid insufficiency is present.  The PA systolic pressure is at least 84 mmHg.  No pericardial effusion is present.  IVC diameter >2.1 cm collapsing <50% with sniff suggests a high RA pressure  estimated at 15 mmHg or greater.  Compared to the study from 6/3/2021, the PA systolic pressure has increased  and the tricuspid regurgitation has increased modestly in severity. The RV  function is similar.  ______________________________________________________________________________  Left Ventricle  Global and regional left ventricular function is normal with an EF of 55-60%.  Left ventricular size is normal. Relative wall thickness is increased  consistent with concentric remodeling. Grade III or advanced diastolic  dysfunction. Diastolic Doppler findings (E/E' ratio and/or other parameters)  suggest left ventricular filling pressures are increased. No regional wall  motion abnormalities are seen.     Right Ventricle  The right ventricle is normal size. The RV function is low-normal.     Atria  The right atria appears normal. Mild left atrial enlargement is present.     Mitral Valve  Mild mitral annular calcification is present. Mild mitral  insufficiency is  present.     Aortic Valve  The aortic valve is tricuspid. Mild aortic valve calcification is present. On  Doppler interrogation, there is no significant stenosis or regurgitation.     Tricuspid Valve  Moderate to severe tricuspid insufficiency is present. RVSP estimated at  atleast 84mmHg, consistent with severe Pulmonary HTN.     Pulmonic Valve  Mild pulmonic insufficiency is present.     Vessels  Sinuses of Valsalva 3.6 cm. Ascending aorta 2.7 cm. IVC diameter >2.1 cm  collapsing <50% with sniff suggests a high RA pressure estimated at 15 mmHg or  greater.     Pericardium  No pericardial effusion is present.     Compared to Previous Study  This study was compared with the study from 6/3/2021 . The PA systolic  pressure has increased and the tricuspid regurgitation has increased modestly  in severity. The RV function is similar.  ______________________________________________________________________________  MMode/2D Measurements & Calculations  RVDd: 3.8 cm  IVSd: 1.2 cm  LVIDd: 4.0 cm  LVIDs: 2.0 cm  LVPWd: 1.2 cm  FS: 51.4 %  LV mass(C)d: 170.7 grams  LV mass(C)dI: 86.3 grams/m2  Ao root diam: 3.6 cm  asc Aorta Diam: 2.7 cm  LVOT diam: 1.7 cm  LVOT area: 2.3 cm2  LA Volume (BP): 77.6 ml     LA Volume Index (BP): 39.2 ml/m2  RWT: 0.61     Doppler Measurements & Calculations  MV E max alberto: 133.0 cm/sec  MV A max alberto: 52.6 cm/sec  MV E/A: 2.5  MV dec slope: 830.0 cm/sec2  TR max alberto: 372.0 cm/sec  TR max P.1 mmHg  E/E' av.8  Lateral E/e': 38.2  Medial E/e': 19.4     ______________________________________________________________________________  Report approved by: Enrique Rodas 2022 01:21 PM           Medications     - MEDICATION INSTRUCTIONS -       - MEDICATION INSTRUCTIONS -       - MEDICATION INSTRUCTIONS -       - MEDICATION INSTRUCTIONS -       - MEDICATION INSTRUCTIONS -         acetylcysteine  4 mL Nebulization Q4H     ampicillin-sulbactam (UNASYN) IV  3 g Intravenous  Q24H     aspirin  81 mg Oral At Bedtime     atorvastatin  40 mg Oral At Bedtime     calcium gluconate  1 g Intravenous Once     fluticasone-vilanterol  1 puff Inhalation Daily     heparin ANTICOAGULANT  5,000 Units Subcutaneous Q12H     levothyroxine  175 mcg Oral QAM AC     midodrine  10 mg Oral TID w/meals     - MEDICATION INSTRUCTIONS -   Does not apply Once     pantoprazole  40 mg Oral BID     QUEtiapine  25 mg Oral At Bedtime     sodium chloride (PF)  3 mL Intravenous Q8H     sodium chloride (PF)  3 mL Intracatheter Q8H     umeclidinium  1 puff Inhalation Daily

## 2022-06-28 NOTE — PROGRESS NOTES
SPIRITUAL HEALTH SERVICES Progress Note  [facility] ([facility]) 6B  Pt Request    Introduced SHS. Pt was lethargic. Offered prayer and pt agreed.    Plan: SHS remains available upon pt/family/staff request.    Edison Dhillon [facility]   Intern  Pager [phone]      * SHS remains available 24/7 for emergent requests/referrals, either by having the switchboard page the on-call  or by entering an ASAP/STAT consult in Epic (this will also page the on-call ). Routine Epic consults receive an initial response within 24 hours.*

## 2022-06-28 NOTE — PROGRESS NOTES
"  Nephrology Progress Note  06/28/2022         Assessment & Recommendations:   ESRD 2/2 diabetic nephropathy on HD MWF  Solitary right kidney s/p donation (1988)  The patient dialyzes MWF at [facility] in [place]. CELSA KUHNF. Primary nephrologist is Dr. Linus POWERS 85 kg. She is not active on the transplant list. The patient was in dialysis unit getting the dialysis but became hypotensive so was brought into the hospital  - had HD today had 1500 ml remove today only         Acute hypoxic respiratory failure secondary to aspiration Pneumonia and Fluid overload:  -We were only able to remove 1500 ml and she became hypotensive. Will recommend ruling out infectious causes. Will do UF only tomorrow     Anemia of chronic disease  Epogen 15,000 international unit hemoglobin of 7.1  -Consider outpatient GI referral for evaluation of chronic GI blood loss      Recommendations were communicated to primary team via notes  Seen and discussed with Dr. Palak Lopez MD   Division of Renal Disease and Hypertension  Sendmebox  myairmail  Vocera Web Console    Interval History :   Nursing and provider notes from last 24 hours reviewed.  In the last 24 hours Kim Anne had hypotension when trying to remove more than 1500 ml  Review of Systems:   I reviewed the following systems:  GI: decrease appetite. No nausea or vomiting or diarrhea.   Neuro:  No confusion  Constitutional:  No fever or chills  CV: posittive dyspnea or edema.  Nochest pain.    Physical Exam:   I/O last 3 completed shifts:  In: 220 [P.O.:220]  Out: 1500 [Other:1500]   BP 91/51 (BP Location: Left arm)   Pulse 54   Temp 97.2  F (36.2  C) (Oral)   Resp 16   Ht 1.651 m (5' 5\")   Wt 91.5 kg (201 lb 11.5 oz)   SpO2 98%   BMI 33.57 kg/m       GENERAL APPEARANCE: Patient is not in distress  EYES:  No scleral icterus, pupils equal  PULM: lungs clear to auscultation bilaterally, equal air movement, no clubbing  CV: Patient has regular rhythm, " normal rate, no rub     -JVD -ve     -edema -ve   GI: soft, non tender, non distended, bowel sounds are present  INTEGUMENT: no cyanosis, no rash  NEURO:  No asterixis   Access Right fistula    Labs:   All labs reviewed by me  Electrolytes/Renal - Recent Labs   Lab Test 06/28/22  1342 06/28/22  0454 06/27/22  1612 06/27/22  1128 06/27/22  1126 10/20/21  0855 06/02/21  1308 02/29/20  0441 02/27/20  0455 02/26/20  0454 01/29/20  0541 01/29/20  0003 01/27/20  1000 01/27/20  0420   NA  --  132* 130* 132*   < > 138   < > 136   < > 140   < >  --    < > 137   POTASSIUM  --  5.3 4.9 4.0   < > 3.9   < > 4.5   < > 4.6  4.6   < > 4.2   < > 4.3   CHLORIDE  --  96* 94* 96*  --  101   < > 105   < > 106   < >  --    < > 106   CO2  --  25 26 29  --  30   < > 24   < > 25   < >  --    < > 27   BUN  --  32.1* 24.8* 21.8  --  37*   < > 32*   < > 30   < >  --    < > 15   CR  --  2.81* 2.40* 2.22*  --  3.73*   < > 4.94*   < > 3.21*   < >  --    < > 1.69*   * 144* 130* 124*   < > 158*   < > 107*   < > 204*   < >  --    < > 134*   FRANCES  --  8.0* 8.7* 7.8*  --  8.7   < > 8.2*   < > 8.8   < >  --    < > 9.3   MAG  --   --   --   --   --  1.7  --   --   --   --   --  1.6  --  1.9   PHOS  --   --   --   --   --   --   --  4.6*  --  3.3  --  2.5  --  2.8    < > = values in this interval not displayed.       CBC -   Recent Labs   Lab Test 06/28/22  0841 06/28/22  0454 06/27/22  1612 06/27/22  1527   WBC  --  5.5 5.0 5.1   HGB 7.1* 6.9* 8.6* 8.5*   PLT  --  232 257 246       LFTs -   Recent Labs   Lab Test 06/27/22  1612 06/27/22  1128 10/20/21  0855   ALKPHOS 312* 346* 276*   BILITOTAL 0.5 0.4 0.7   ALT 13 14 14   AST 37* 23 32   PROTTOTAL 6.9 6.1* 7.2   ALBUMIN 3.6 3.0* 2.9*       Iron Panel -   Recent Labs   Lab Test 06/28/22  0454 06/03/21  0533 06/03/21  0349 02/25/20  0955   IRON 35*  --  27* 47   IRONSAT 16  --  7* 20   *   < >  --  1,088*    < > = values in this interval not displayed.         Imaging:  All imaging studies  reviewed by me.     Current Medications:    acetylcysteine  4 mL Nebulization Q4H     ampicillin-sulbactam (UNASYN) IV  3 g Intravenous Q24H     aspirin  81 mg Oral At Bedtime     atorvastatin  40 mg Oral At Bedtime     calcium gluconate  1 g Intravenous Once     fluticasone-vilanterol  1 puff Inhalation Daily     heparin ANTICOAGULANT  5,000 Units Subcutaneous Q12H     levothyroxine  175 mcg Oral QAM AC     midodrine  10 mg Oral TID w/meals     pantoprazole  40 mg Oral BID     QUEtiapine  25 mg Oral At Bedtime     sodium chloride (PF)  3 mL Intravenous Q8H     sodium chloride (PF)  3 mL Intracatheter Q8H     umeclidinium  1 puff Inhalation Daily       - MEDICATION INSTRUCTIONS -       - MEDICATION INSTRUCTIONS -       - MEDICATION INSTRUCTIONS -       - MEDICATION INSTRUCTIONS -       Oni Lopez MD
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
## 2022-06-28 NOTE — PLAN OF CARE
Neuro: Lethargic this morning but awaken with voice.  Able to answer questions but with some confusions.    Cardiac: SB on tele when resting.  VS WNL   Respiratory: Nasal Cannula at 1-2lpm, with some SOB when eating.  GI/: Diminished urine output (dialysis patient).  No BM today.  Diet/appetite: Advanced to renal diet, able to tolerated breakfast with feeding assistance.  Activity:  Asst of 2 with cares, able to turn herself independently.  Pain: Denies any pain.  Skin: Scabs on left/right shins, bunion on left and right feet, denies any pain. Slight redness to left groin area.  LDA's:  AV fistula on right arm with bruit and thrill, left PIV with saline lock.    Plan: Currently with dialysis.  Blood culture came positive, providers aware and with continue monitoring.

## 2022-06-28 NOTE — PHARMACY-ADMISSION MEDICATION HISTORY
Admission Medication History Completed by Pharmacy    See [facility] Admission Navigator for allergy information, preferred outpatient pharmacy, prior to admission medications and immunization status.     Medication History Sources:     [facility] MAR ([phone]), pharmacy dispense record    Changes made to PTA medication list (reason):    Added: (per MAR)  o Acetaminophen 1000mg- scheduled and as needed  o Gabapentin 100 mg daily  o Lorazepam 0.5 mg prior to dialysis  o Ipratropium/albuterol nebulizer - scheduled and as needed  o Senna-docusate  o Vitamin B-Complex    Deleted: None    Changed: (per MAR/pharmacy fill records)  o Levothyroxine from 175 mcg daily to 200 mcg daily. Per MAR, patient was switched from 200 mcg to 250 mcg daily on June 18th. 2022. Per pharmacy fill history, 200 mcg tablets are being filled consistently.  o Pantoprazole 40 mg changed from twice daily to once daily.    Additional Information:    Patient was unable to provide accurate medication history. Collected information from facility MAR where patient is provided all of the medications. All morning medications were taken on 6/27 per MAR paperwork. Compared information to fill history.    Clopidogrel - Not on patient's MAR, no recent fill history, please discontinue if no longer needed    Prior to Admission medications    Medication Sig Last Dose Taking? Auth Provider Long Term End Date   acetaminophen (TYLENOL) 500 MG tablet Take 1,000 mg by mouth every evening 6/26/2022 PM Yes Unknown, Entered By History     acetaminophen (TYLENOL) 500 MG tablet Take 1,000 mg by mouth 2 times daily as needed for mild pain Unknown at Unknown time Yes Unknown, Entered By History     albuterol (PROAIR HFA/PROVENTIL HFA/VENTOLIN HFA) 108 (90 Base) MCG/ACT inhaler Inhale 2 puffs into the lungs every 6 hours as needed for shortness of breath / dyspnea or wheezing Unknown at Unknown time Yes Flakito Pickens MD Yes    ammonium lactate  (LAC-HYDRIN) 12 % external lotion Apply topically 2 times daily 6/27/2022 at AM Yes Ailyn Nieves APRN CNP     ASPIR-LOW 81 MG EC tablet Take 1 tablet (81 mg) by mouth At Bedtime 6/27/2022 0500 Yes Ailyn Nieves APRN CNP     atorvastatin (LIPITOR) 40 MG tablet Take 1 tablet (40 mg) by mouth At Bedtime 6/26/2022 PM Yes Ailyn Nieves APRN CNP Yes    blood glucose monitoring (FREESTYLE LITE) test strip TEST BLOOD SUGAR TWO TIMES A DAY Unknown at Unknown time Yes Ailyn Nieves APRN CNP     blood glucose monitoring (FREESTYLE) lancets Test BS two times daily as directed Unknown at Unknown time Yes Ailyn Nieves APRN CNP     Emollient (EUCERIN CALMING DAILY MOIST) CREA Externally apply 1 dose. topically daily Unknown at Unknown time Yes Ailyn Nieves APRN CNP     fluticasone-salmeterol (ADVAIR) 250-50 MCG/DOSE inhaler Inhale 1 puff into the lungs every 12 hours 6/27/2022 at AM Yes Flakito Pickens MD Yes    gabapentin (NEURONTIN) 100 MG capsule Take 100 mg by mouth daily 6/26/2022 at PM Yes Unknown, Entered By History     ipratropium - albuterol 0.5 mg/2.5 mg/3 mL (DUONEB) 0.5-2.5 (3) MG/3ML neb solution Take 1 vial by nebulization every evening Past Week at Unknown time Yes Unknown, Entered By History No    ipratropium - albuterol 0.5 mg/2.5 mg/3 mL (DUONEB) 0.5-2.5 (3) MG/3ML neb solution Take 1 vial by nebulization every 6 hours as needed for shortness of breath / dyspnea or wheezing Unknown at Unknown time Yes Unknown, Entered By History No    levothyroxine (SYNTHROID/LEVOTHROID) 200 MCG tablet Take 200 mcg by mouth daily 6/27/2022 at AM Yes Unknown, Entered By History No    LORazepam (ATIVAN) 0.5 MG tablet Take 0.5 mg by mouth three times a week Mon/Wed/Fri prior to HD sessions 6/27/2022 at AM Yes Unknown, Entered By History     midodrine (PROAMATINE) 10 MG tablet Take 10 mg (one tab) before receiving hemodialysis 6/27/2022 at AM Yes Nick Chew MD     multivitamin RENAL  (NEPHROCAPS/TRIPHROCAPS) 1 MG capsule Take 1 capsule by mouth daily 6/27/2022 at Unknown time Yes Ailyn Nieves APRN CNP     nitroGLYcerin (NITROSTAT) 0.4 MG sublingual tablet Place 1 tablet (0.4 mg) under the tongue every 5 minutes as needed for chest pain Unknown at Unknown time Yes Ailyn Nieves APRN CNP Yes    pantoprazole (PROTONIX) 40 MG EC tablet Take 1 tablet (40 mg) by mouth 2 times daily  Patient taking differently: Take 40 mg by mouth daily 6/27/22 AM Yes Efrain Mccain PA-C     polyethylene glycol (MIRALAX) packet Take 17 g by mouth daily as needed for constipation Unknown at Unknown time Yes Flakito Pickens MD     senna-docusate (SENOKOT-S/PERICOLACE) 8.6-50 MG tablet Take 2 tablets by mouth At Bedtime 6/26/22 HS Yes Unknown, Entered By History     tiotropium (SPIRIVA) 18 MCG inhaled capsule Inhale 1 capsule (18 mcg) into the lungs daily 6/26/22 PM Yes Flakito Pickens MD Yes    vitamin B complex with vitamin C (STRESS TAB) tablet Take 1 tablet by mouth daily (with dinner) 6/26/22 PM Yes Unknown, Entered By History     vitamin D3 (CHOLECALCIFEROL) 2000 units (50 mcg) tablet Take 1 tablet (2,000 Units) by mouth daily 6/27/2022 at Unknown time Yes Flakito Pickens MD Yes    clopidogrel (PLAVIX) 75 MG tablet Take 1 tablet (75 mg) by mouth daily   Efrain Mccain PA-C Yes        Date completed: 06/28/22    Medication history completed by: Gaby Howard, Pharmacy Resident

## 2022-06-28 NOTE — CONSULTS
Care Management Initial Consult    General Information  Assessment completed with: Sister/guardian Vandana  Type of CM/SW Visit: Initial Assessment    Primary Care Provider verified and updated as needed: Yes   Readmission within the last 30 days: no previous admission in last 30 days      Reason for Consult: other  Advance Care Planning:     On file  General Information Comments: elevated risk score    Communication Assessment  Patient's communication style: spoken language English    Hearing Difficulty or Deaf: no   Wear Glasses or Blind: yes    Cognitive  Cognitive/Neuro/Behavioral: .WDL except  Level of Consciousness: confused, lethargic  Arousal Level: arouses to voice  Orientation: disoriented to, situation  Mood/Behavior: calm  Best Language: 0 - No aphasia  Speech: slow    Living Environment:   People in home: facility resident     Current living Arrangements: extended care facility      Able to return to prior arrangements:  TBD       Family/Social Support:  Care provided by: 24/7 LTC at the [facility]  Provides care for: no one, unable/limited ability to care for self     Sibling Vandana is her guardian, Children          Description of Support System: Supportive, Involved         Current Resources:   Patient receiving home care services: No     Community Resources: None  Equipment currently used at home: walker, sukhwinder  Supplies currently used at home: None    Employment/Financial:  Employment Status:     disabled     Financial Concerns:   none          Lifestyle & Psychosocial Needs:  Social Determinants of Health     Tobacco Use: High Risk     Smoking Tobacco Use: Heavy Tobacco Smoker     Smokeless Tobacco Use: Never Used   Alcohol Use: Not on file   Financial Resource Strain: Not on file   Food Insecurity: Not on file   Transportation Needs: Not on file   Physical Activity: Not on file   Stress: Not on file   Social Connections: Not on file   Intimate Partner Violence: Not on file    Depression: Not at risk     PHQ-2 Score: 0   Housing Stability: Not on file       Functional Status:  Prior to admission patient needed assistance: 24/7 LTC at the [facility]       Mental Health Status:  Adjustment disorder with depressed mood, memory difficulties - per chart review       Chemical Dependency Status:none noted in chart review         Values/Beliefs:  Spiritual, Cultural Beliefs, Adventism Practices, Values that affect care: yes          Values/Beliefs Comment: Protestant    Additional Information:  Per H&P: Kim Anne is a 75 yo woman with past medical history of dementia (legal guardian Vandana, sister), ESRD 2/2 diabetic nephropathy on HD, CAD, history NSTEMI 75 y.o. female with PMH of dementia with no decision making capacity (has guardian), hypothyroidism, ESRD 2/2 diabetic nephropathy on HD, CAD and suspected NSTEMI without angiographic evaluation 6/2021, HFpEF who presents with hypotension from dialysis.    SW spoke with pt's sister/Guardian Vandana via phone.  She reports the following information:  -Pt [place] in LTC   [place] [address]   Main Ph [phone]   Liaison Ph [phone]   Fax [phone]  -When pt lived with sister/guardian she only used oxygen with activity  -She has a walker, but unsure of where it is  -Pt's son Ki passed away in March 2022    SW will follow.    JAME FullerW, LSW  6B Intermediate Care Unit   [facility] [place]: [phone]  Pager: [phone]  Fax: [phone]    For weekend social work needs, contact information below and available on [employer]:  4A, 4C, 4E, 5A, 5B  pager [phone]  6A, 6B, 6C, 6D                       pager [phone]  7A, 7B, 7C, 7D, 5C  pager [phone]     For weekend RN care coordinator needs (home discharge with needs including home care, assisted living facility returns, durable medical  equip, IV antibiotics) - page [phone].

## 2022-06-28 NOTE — PROGRESS NOTES
"Hemodialysis Progress Note    I personally reviewed the vital signs, medications, labs, and imaging.  Subjective: I examined the patient during dialysis.  The patient tolerating dialysis well except marginal hypotension so we have to reduce the fluid proved to 2.3 L.  She denies any chest pain.    Objective:  BP 91/51 (BP Location: Left arm)   Pulse 54   Temp 97.2  F (36.2  C) (Oral)   Resp 16   Ht 1.651 m (5' 5\")   Wt 91.5 kg (201 lb 11.5 oz)   SpO2 98%   BMI 33.57 kg/m      Intake/Output Summary (Last 24 hours) at 6/28/2022 1713  Last data filed at 6/28/2022 1440  Gross per 24 hour   Intake 220 ml   Output 1500 ml   Net -1280 ml       My key history or physical exam findings:   GA: Alert not in acute distress but on oxygen  Heart: RRR  Lung: CTABL from anterior field  Abdomen: Soft nontender  MSK: Trace to 1+ pitting edema    Problem list &Plan  # IDH; resolved  # ESKD MWF   # Volume overload  We dialyze the patient today and only able to remove 2.3 L.  Given that the patient is still above her dry weight we will do short run for ultrafiltration tomorrow.  # Anemia secondary to CKD   # Iron deficiency anemia  Would continue Epogen. Would load iron with dialysis next run.       Time spent 25 minutes on this date of encounter for chart review, history taking, physical exam, medical decision making and co-ordination of care on this date of encounter.   Steve Cid  Date of Service (when I saw the patient): June 28, 2022    "

## 2022-06-28 NOTE — INTERIM SUMMARY
Contrast IV Infiltration Note    S: Provider was called to CT1 regarding contrast infiltration into the left AC (approximately 60 ml) while patient was there for CTA chest to evaluate for PE. Patient endorses discomfort but denies numbness, weakness, tingling, or any other symptoms.    O:  Gen: Patient is uncomfortable on the CT scanner  CV: Warm, well perfused  Resp: Breathing comfortably  Left upper extremity: Fullness and tenderness to palpation about the left AC fossa without skin changes. Normal strength and sensation through the left fingers.    A/P:  Approximately 60 ml of contrast infiltration into the left AC fossa and forearm. No evidence of acute complication at this time.    Recommend  - Elevation  - Cold compresses  - Close monitoring for any changes of sensation, strength, or progressive pain.      Calderon Whitehead MD  Diagnostic Radiology PGY-4

## 2022-06-28 NOTE — PHARMACY-CONSULT NOTE
Pharmacy Delirium Chart Review    76 yof admit for acute hypoxic respiratory failure now with acute delirium on top of known chronic dementia. Pharmacy was consulted to review medications for causes of delirium.    Assessment/recommendations  1. Upon chart review, it appears the patient has the potential for multifactorial delirium likely from medical co-morbidities  2. Ampicillin/sulbactam (and beta lactam antibiotics like pip/tazo that the patient was receiving previously) have a very low risk of causing delirium/agigation.  I would suspect the patient has multiple factors contributing and would not consider the antibiotics to be high suspicion at this time.     Tiffanie Jackson formerly [facility]  Phone/Pager: [phone]

## 2022-06-28 NOTE — PROGRESS NOTES
HEMODIALYSIS TREATMENT NOTE    Date: 6/28/2022  Time: 3:03 PM    Data:  Pre Wt:   91.5kg   Desired Wt: 89.5 kg   Ultrafiltration - Post Run Net Total Removed (mL): 1500 mL  Vascular Access Status: patent  Dialyzer Rinse: Clear  Total Blood Volume Processed: 81.04 L Liters  Total Dialysis (Treatment) Time: 3.5 Hours  Heparin: none    Lab:   Yes  Hepatitis B    Assessment/Interventions:  Patient ran 3.5 hrs via AVF with blood flow rate 400ml/min. UF goal reduced due to low BP episodes. Midodrine 10mg given during HD run for BP support. Net fluid removal 1.5kg.  nephrologist is notified. Post dialysis hand off report given to primary floor RN.            Plan:    Next HD run per renal team.

## 2022-06-28 NOTE — PLAN OF CARE
Neuro: Intermittent lethargy and alertness, anxious.  Cardiac: SR. VSS.   Respiratory: Sating 94% on 2L NC.  GI/: No urine output. BM X1  Diet/appetite: Tolerating NPO diet.   Activity:  Assist of 2 in bed.  Pain: At acceptable level on current regimen.   Skin: No new deficits noted.  LDA's: PIVx2, RUE AV fistula.    Plan: Continue with POC. Notify primary team with changes. Heparin gtt infusing. Pt very agitated/anxious at end of shift.

## 2022-06-28 NOTE — PROGRESS NOTES
Time of notification: 2:54 AM  Provider notified: Haleigh  Patient status: 6B Rm 36-1, pt continues to be agitated and restless. Anything else we can give her. Thanks, [place] [id number]  Temp:  [96.3  F (35.7  C)-97.8  F (36.6  C)] 97.2  F (36.2  C)  Pulse:  [] 75  Resp:  [11-28] 28  BP: ()/(52-96) 138/96  SpO2:  [82 %-100 %] 96 %  Orders received: MD called, order for seroquel placed. By the time I went to get the med and give to patient, the patient had pulled one of her IV's and I wasn't able to give the med to the patient. She then fell asleep while we cleaned her up. New IV ordered.   9163 pt awoke and began pulling at lines again. seroquel given

## 2022-06-29 NOTE — PLAN OF CARE
Goal Outcome Evaluation:      Neuro: A&Ox 3, pt is sleepy but arouses to voice follow commands  Cardiac: SR. VSS.   Respiratory: Sating 49%> on 2 LPM.  GI/: No urinal output pt is on HD through rt VAD.  BM X1  Diet/appetite: Tolerating renal diet. Eating well.  Activity:  Assist of 2, up to chair and in halls.  Pain: At acceptable level on current regimen.   Skin: No new deficits noted.  LDA's:one piv    Plan: Continue with POC. Notify primary team with changes.
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
## 2022-06-29 NOTE — PROGRESS NOTES
[facility]    Progress Note - Medicine Service, TED TEAM 1       Date of Admission:  6/27/2022    Assessment & Plan        Kim Anne is a 76 year old female admitted on 6/27/2022. She has a history of dementia (legal dax Ross, sister), ESRD 2/2 diabetic nephropathy on HD, CAD, history of NSTEMI, HFpEF and is admitted for hypotension from dialysis and acute hypoxic and hypercapnic respiratory failure.     Updates:  -Increased midodrine to 15mg with meals for persistent hypotension  -Given albumin x1 for persistent hypotension  -BiPAP discontinued as she did not tolerate it, O2 saturation stable on 2 L, mental status improved today  - Echocardiogram resulted,  EF 55-60%, increased fluid with plethoric IVC without respiratory variation, left ventricular hypertrophy, increased RA pressure and increased tricuspid regurgitation.  -Spoke with Echo about MADELAINE if bacteremia persists to visualize any vegetations, no vegetations were seen on initial TTE.  - Repeat Hgb of 7.2 (7.7 on admission and most recently has been in the 7s). Monitor for signs of bleeding.   - Repeat blood culture follow up  - Dental hygiene consult given poor dentition and most likely source of her strep  - ID consulted for assessment of streptococcus bacteremia  - US of AV fistula to rule out abscess     # Acute hypoxic and hypercapnic respiratory failure-Improving  # History HFpEF  Etiology is likely to be secondary to CAP and/or volume overload in setting of HFpEF and ESRD. CAP/sepsis is more likely as blood culture showed gram positive cocci in pairs and chains, streptococcus. CT with evidence of mucus plugging/possible aspiration pneumonia. HFpEF is also possible as she presented with pulmonary edema and lower extremity edema. ECHO results show EF 55-60%, increased fluid with plethoric IVC without respiratory variation, left ventricular hypertrophy, increased RA pressure and  increased tricuspid regurgitation. PE is very unlikely as the CT was negative for any evidence of PE.  - Metaneb and mucomyst with albuterol nebs to help with airway clearance  - Trending ABGs, appear to be improving since arrival  - Abxs as below     # Streptococcus Mitis bacteremia   # Concern for sepsis 2/2 community acquired pneumonia vs. Aspiration pneumonia  ABx: Unasyn   Blood culture returned positive for gram positive cocci in pairs and chains, specifically strep mitis  Blood Culture f/u  Repeat Blood culture tomorrow  Dental Consult f/u  ID consult f/u   Add vancomycin if patient decompensates  MRSA nares: negative    # ESRD on HD M/W/F  EDW reported 81 kg, recent post run weights closer to 84 to 85 kg, presented here at 90.5 kg though with fluid removal in dialysis became hypotensive.   - Nephro consult for HD  - Dialyzes MWF through RUE fistula  - Does make some urine     # Hx CAD  Low concern for ACS due to no chest pain throughout hospitalization. Improved shortness of breath with BiPAP and O2 supplementation.   - PTA atorvastatin  - PTA ASA     # Chronic macrocytic anemia  # reported history GI bleed?  Presented at hgb 8.5, consistent with chronic anemia. Prior B12 and folate levels wnl, likely has chronic disease component. Reported history of GI bleeding though not definitive, patient denies though history unreliable, will monitor while on heparin. Hgb has remained stable at 7.2. Iron studies returned suggestive of anemia of chronic disease.  - PTA PPI  - CBC f/u, transfuse if hgb<7  - Continue Epogen and load iron with next dialysis run per nephrology     # COPD  - continue PTA LAMA/LABA/ICS therapy with albuterol prn  - consulted respiratory therapy for COPD education and optimization of therapy     # Hypothyroidism  - TSH f/u per pharmacy  - Continue PTA levothyroxine 175 mcg daily      # Dementia  Alternative decision maker is sister, Vandana. Lives with her at home.  Update Vandana daily with  "plan  Seroquel at bedtime to help with delirium  Delirium precautions in place        Diet: Renal Diet (dialysis)    DVT Prophylaxis: Heparin subcutaneous  Chaney Catheter: Not present  Fluids: none  Central Lines: None  Cardiac Monitoring: ACTIVE order. Indication: Electrolyte Imbalance (24 hours)- Magnesium <1.3 mg/ml; Potassium < =2.8 or > 5.5 mg/ml  Code Status: Full Code      Disposition Plan      Expected Discharge Date: 07/05/2022                The patient's care was discussed with the Attending Physician, Dr. Greenberg.    Delbert Castro  Medical Student  Medicine Service, [address]  Securely message with the Vocera Web Console (learn more here)  Text page via [facility] Paging/Directory   Please see signed in provider for up to date coverage information      Clinically Significant Risk Factors Present on Admission               # Obesity: Estimated body mass index is 32.54 kg/m  as calculated from the following:    Height as of this encounter: 1.651 m (5' 5\").    Weight as of this encounter: 88.7 kg (195 lb 8.8 oz).         Physician Attestation   I, Debra Greenberg MD, was present with the medical/JILLIAN student who participated in the service and in the documentation of the note.  I have verified the history and personally performed the physical exam and medical decision making.  I agree with the assessment and plan of care as documented in the note.      I personally reviewed vital signs, medications and labs.    Debra Greenberg MD  Date of Service (when I saw the patient): 06/29/22    ______________________________________________________________________    Interval History   Kim had 2 episodes of hypotension overnight. BP of 85/52 at 2350, 250 ml LR bolus given. BP improved to 100/59. BP of 61/33 at 0500, 250 ml LR bolus and 10 mg Midodrine given. BP improved to 83/44.     Review of systems: Patient remained afebrile and denied any shortness of " breath, dizziness, or chest pain.     Data reviewed today: I reviewed all medications, new labs and imaging results over the last 24 hours. I personally reviewed   Physical Exam   Vital Signs: Temp: 98.4  F (36.9  C) Temp src: Oral BP: (!) 82/43 Pulse: 51   Resp: 14 SpO2: 94 % O2 Device: Nasal cannula Oxygen Delivery: 2 LPM  Weight: 195 lbs 8.77 oz  Constitutional: Sitting in bed eating breakfast.  Eyes: No scleral icterus   ENT: atraumatic, without obvious abnormality  Respiratory: good air exchange, diffuse bilateral crackles bilaterally, left greater than right  Cardiovascular:  Bradycardia, regular rhythm and no murmur noted  GI: soft, non-distended, non-tender  Skin: LE abrasions, appear dry and healing well. Bruises on left forearm near previous IV placement  Musculoskeletal: Trace lower extremity edema. Able to move upper and lower extremities. Able to roll on side for respiratory exam  Neurologic: slightly somnolent, alert, oriented x4    /Data   Recent Labs   Lab 06/29/22  0552 06/28/22  1932 06/28/22  1342 06/28/22  0841 06/28/22  0454 06/27/22  1612 06/27/22  1527 06/27/22  1128   WBC 4.7  --   --   --  5.5 5.0   < > 4.6   HGB 7.2* 7.2*  --  7.1* 6.9* 8.6*   < > 7.7*   *  --   --   --  111* 108*   < > 111*     --   --   --  232 257   < > 248   *  --   --   --  132* 130*  --  132*   POTASSIUM 4.7  --   --   --  5.3 4.9  --  4.0   CHLORIDE 97*  --   --   --  96* 94*  --  96*   CO2 26  --   --   --  25 26  --  29   BUN 25.2*  --   --   --  32.1* 24.8*  --  21.8   CR 2.29*  --   --   --  2.81* 2.40*  --  2.22*   ANIONGAP 11  --   --   --  11 10  --  7   FRANCES 8.3*  --   --   --  8.0* 8.7*  --  7.8*   *  --  177*  --  144* 130*  --  124*   ALBUMIN  --   --   --   --   --  3.6  --  3.0*   PROTTOTAL  --   --   --   --   --  6.9  --  6.1*   BILITOTAL  --   --   --   --   --  0.5  --  0.4   ALKPHOS  --   --   --   --   --  312*  --  346*   ALT  --   --   --   --   --  13  --  14   AST  --    --   --   --   --  37*  --  23    < > = values in this interval not displayed.     No results found for this or any previous visit (from the past 24 hour(s)).  Medications     - MEDICATION INSTRUCTIONS -       - MEDICATION INSTRUCTIONS -       - MEDICATION INSTRUCTIONS -       - MEDICATION INSTRUCTIONS -         acetylcysteine  4 mL Nebulization BID     ampicillin-sulbactam (UNASYN) IV  3 g Intravenous Q24H     aspirin  81 mg Oral At Bedtime     atorvastatin  40 mg Oral At Bedtime     calcium gluconate  1 g Intravenous Once     fluticasone-vilanterol  1 puff Inhalation Daily     heparin ANTICOAGULANT  5,000 Units Subcutaneous Q12H     levothyroxine  175 mcg Oral QAM AC     midodrine  10 mg Oral TID w/meals     pantoprazole  40 mg Oral BID     QUEtiapine  25 mg Oral At Bedtime     sodium chloride (PF)  3 mL Intravenous Q8H     sodium chloride (PF)  3 mL Intracatheter Q8H     umeclidinium  1 puff Inhalation Daily

## 2022-06-29 NOTE — PROGRESS NOTES
"  Nephrology Progress Note  06/29/2022         Assessment & Recommendations:   ESRD 2/2 diabetic nephropathy on HD MWF  Solitary right kidney s/p donation (1988)  The patient dialyzes MWF at [facility] in [place]. [facility] AVF. Primary nephrologist is Dr. Linus POWERS 85 kg. She is not active on the transplant list. The patient was in dialysis unit getting the dialysis but became hypotensive so was brought into the hospital  - Patient hypotensive so no dialysis    Septic shock and streptococcus mitis bacteriemia : Patient is growing Strep Mitis  -Blood culture repeated. Patient also has ascites consider tapping it to rule out SBP.Patient dialysis unit reports her blood pressure is 110's         Acute hypoxic respiratory failure secondary to aspiration Pneumonia and Fluid overload:  -Could not remove fluis     Anemia of chronic disease  Epogen 15,000 international unit hemoglobin of 7.1.Patient also get IV iron 100 mg will hold it because of infection  -Consider outpatient GI referral for evaluation of chronic GI blood loss    BMD  -on Hectoral 1.5 mcg      Recommendations were communicated to primary team via notes  Seen and discussed with Dr. Palak Lopez MD   Division of Renal Disease and Hypertension  [facility]  myairmail  Vocera Web Console    Interval History :   Nursing and provider notes from last 24 hours reviewed.  In the last 24 hours Kim Anne had hypotension today found to have bacteriemia. Looks septic  Review of Systems:   I reviewed the following systems:  GI: decrease appetite. No nausea or vomiting or diarrhea.   Neuro:  No confusion  Constitutional:  No fever or chills  CV: posittive dyspnea or edema.  Nochest pain.    Physical Exam:   I/O last 3 completed shifts:  In: 700 [P.O.:700]  Out: 5 [Urine:5]   BP (!) 86/41 (BP Location: Left arm)   Pulse 51   Temp 98  F (36.7  C) (Oral)   Resp 16   Ht 1.651 m (5' 5\")   Wt 88.7 kg (195 lb 8.8 oz)   SpO2 100%   BMI 32.54 kg/m  "      GENERAL APPEARANCE: Looks sepitic  EYES:  No scleral icterus, pupils equal  PULM: lungs clear to auscultation bilaterally, equal air movement, no clubbing  CV: Patient has regular rhythm, normal rate, no rub     -JVD -ve     -edema -ve   GI: soft, non tender, non distended, bowel sounds are present  INTEGUMENT: no cyanosis, no rash  NEURO:  No asterixis   Access Right fistula    Labs:   All labs reviewed by me  Electrolytes/Renal -   Recent Labs   Lab Test 06/29/22  0552 06/28/22  1342 06/28/22  0454 06/27/22  1612 06/27/22  1126 10/20/21  0855 06/02/21  1308 02/29/20  0441 02/27/20  0455 02/26/20  0454 01/29/20  0541 01/29/20  0003 01/27/20  1000 01/27/20  0420   *  --  132* 130*   < > 138   < > 136   < > 140   < >  --    < > 137   POTASSIUM 4.7  --  5.3 4.9   < > 3.9   < > 4.5   < > 4.6  4.6   < > 4.2   < > 4.3   CHLORIDE 97*  --  96* 94*   < > 101   < > 105   < > 106   < >  --    < > 106   CO2 26  --  25 26   < > 30   < > 24   < > 25   < >  --    < > 27   BUN 25.2*  --  32.1* 24.8*   < > 37*   < > 32*   < > 30   < >  --    < > 15   CR 2.29*  --  2.81* 2.40*   < > 3.73*   < > 4.94*   < > 3.21*   < >  --    < > 1.69*   * 177* 144* 130*   < > 158*   < > 107*   < > 204*   < >  --    < > 134*   FRANCES 8.3*  --  8.0* 8.7*   < > 8.7   < > 8.2*   < > 8.8   < >  --    < > 9.3   MAG  --   --   --   --   --  1.7  --   --   --   --   --  1.6  --  1.9   PHOS  --   --   --   --   --   --   --  4.6*  --  3.3  --  2.5  --  2.8    < > = values in this interval not displayed.       CBC -   Recent Labs   Lab Test 06/29/22  0552 06/28/22  1932 06/28/22  0841 06/28/22  0454 06/27/22  1612   WBC 4.7  --   --  5.5 5.0   HGB 7.2* 7.2* 7.1* 6.9* 8.6*     --   --  232 257       LFTs -   Recent Labs   Lab Test 06/27/22  1612 06/27/22  1128 10/20/21  0855   ALKPHOS 312* 346* 276*   BILITOTAL 0.5 0.4 0.7   ALT 13 14 14   AST 37* 23 32   PROTTOTAL 6.9 6.1* 7.2   ALBUMIN 3.6 3.0* 2.9*       Iron Panel -   Recent Labs    Lab Test 06/28/22  0454 06/03/21  0533 06/03/21  0349 02/25/20  0955   IRON 35*  --  27* 47   IRONSAT 16  --  7* 20   *   < >  --  1,088*    < > = values in this interval not displayed.         Imaging:  All imaging studies reviewed by me.     Current Medications:    acetylcysteine  4 mL Nebulization BID     ampicillin-sulbactam (UNASYN) IV  3 g Intravenous Q24H     aspirin  81 mg Oral At Bedtime     atorvastatin  40 mg Oral At Bedtime     calcium gluconate  1 g Intravenous Once     fluticasone-vilanterol  1 puff Inhalation Daily     [Held by provider] heparin ANTICOAGULANT  5,000 Units Subcutaneous Q12H     levothyroxine  175 mcg Oral QAM AC     midodrine  15 mg Oral TID w/meals     pantoprazole  40 mg Oral BID     QUEtiapine  25 mg Oral At Bedtime     sodium chloride (PF)  3 mL Intravenous Q8H     sodium chloride (PF)  3 mL Intracatheter Q8H     umeclidinium  1 puff Inhalation Daily       - MEDICATION INSTRUCTIONS -       - MEDICATION INSTRUCTIONS -       - MEDICATION INSTRUCTIONS -       - MEDICATION INSTRUCTIONS -       Oni Lopez MD

## 2022-06-29 NOTE — PROGRESS NOTES
Physician Attestation   I, Steve Cid, saw and evaluated Kim Anne with Resident/Fellow. I have reviewed and discussed with the Resident/Fellow their history, physical and plan.    I personally reviewed the vital signs, medications, labs, and imaging.    In brief, the patient remained hypotensive this morning so we cannot do ultrafiltration.  Upon examination, the patient still has some lower extremity edema but able to lay down flat comfortably. Her blood culture grew Strep mitis.   # IDH; resolved  # ESKD MWF   # Volume overload  We will hold off on ultrafiltration today given patient is hypotensive.  Noted that the team increase midodrine to 15 mg 3 times daily.  If tomorrow, she could not tolerate IHD, she may need CRRT.  # Anemia secondary to CKD   # Iron deficiency anemia  Would continue Epogen. No iron given bacteremia.  # S. mitits bacteremia  # Hypotension  Source PNA or dental?    Rest per the resident/fellow's note.   Total time spent 25 minutes on the date of encounter for chart review, history taking, physical exam, labs and notes reviewed, advised and coordinating care.     Steve Cid  Date of Service (when I saw the patient): June 29, 2022

## 2022-06-29 NOTE — PLAN OF CARE
Neuro: Oriented x2 overnight. AOx4 in AM. Able to answer questions but with some confusions. Forgetful. Lethargic/solumnent for most of night.  Cardiac: SB high 40s to high 50s when resting. Provider aware. Provider notified for BP of 85/52 at 23:50. 250 ml LR bolus given. BP recheck: 100/59. Provider notified at 05:00 for BP of 61/33.  250 ml LR bolus and 10 mg Midodrine given per orders. Last BP recheck at 06:45 - 83/44 MAP 57. Denies chest pain. Afebrile. Pt reports intermittent dizziness.  Respiratory: Denies SOB. Nasal Cannula at 2 L. Pt did not tolerate Bipap at night. Provider notified. Says OK to stay on NC because Pt SpO2 overnight %. Persistent productive cough in AM with thick sputum.  GI/: Very minimal urine output (dialysis patient). Small hard bowel movement. Pt feels constipated. Provider notified.  Diet/appetite: Renal diet. Fair appetite.  Activity:  A2 up to commode x2. Pt weight shifts independently with encouragement.  Pain: Headache in AM, PRN Tylenol given x1.  Skin: No new deficits noted.  LDA's:  R AV fistula. L PIV.

## 2022-06-29 NOTE — PLAN OF CARE
"Goal Outcome Evaluation:    BP 91/51 (BP Location: Left arm)   Pulse 54   Temp 97.2  F (36.2  C) (Oral)   Resp 16   Ht 1.651 m (5' 5\")   Wt 91.5 kg (201 lb 11.5 oz)   SpO2 98%   BMI 33.57 kg/m      SB with PCAs 50s-60s. 1-2L NC, to wear bipap overnight. A&O x2-3. Follows commands. Denies pain. Tolerating diet. Anuric with dialysis. No BM this shift. R arm fistula. L PIV for abx. Pt declines OOB activity this shift. HD run today and tomorrow. Continue to monitor.     "

## 2022-06-29 NOTE — CONSULTS
Chronic Pulmonary Disease Specialist Consult   COPD Initial Interview    2022    Patient: Kim Anne      :  1945                    MRN:[id number]      Reason for Consult:  Consulted to provide COPD education and optimize treatment regimen. Patient with mild COPD being followed by COPD Readmission Reduction Program    History of Present Illness: Etiology PE vs. Possible sepsis vs. Acute decompensated HFpEF. Presented with hypotension from dialysis after having fluid removed (reported around 3 L removed, then 2 L given back, with pre and post dialysis weight 89 kg, up 8 kg from EDW of 81 kg). Hypotensive on arrival here after fluids to 86/53 and hypoxic initially requiring 4 lpm by nasal cannula with CXR showing suspicion for pulmonary edema and VBG showing hypercapnia and acidemia subsequently warranting initiation of BiPAP therapy. Was given methylprednisolone 125 mg, duoneb with suspicion that this may represent COPD exacerbation, though patient moving air well without wheezing and without prodromal symptoms to suspect this. Onset seemed more acute, and further evaluation revealed elevated BNP, mildly elevated troponin.    Most recent hospitalization and reason:  2021 - 2021 at [facility] for pulmonary edema, SOB, and right lower extremity swelling         Smoking Hx:   4 cigarettes/day   (Patient resides in nursing home and doubt if she continues to smoke) Patient is unable to fully answer all questions regarding her smoking.     Diagnosis of COPD:  Mild airflow obstruction on 2019 PFT's with FEV1 83%.     Pulmonologist/Last office visit   Patient was last seen by Dr. Funez on 2019.    Most recent  PFT/interpretation on:  2019       FVC    2.53 L  96%   FEV1    1.71 L  83%   FEV1/FVC   (Ratio)                                       68%   DLCO    Interpretation      Mild airway obstruction    Relevant Sleep Studies:  none    Home Oxygen Use:   none    Most recent Chest  X-ray:  6/27/2022     1. Stable findings suggestive of pulmonary interstitial edema with  possible trace bilateral pleural effusions.  2. No new focal airspace opacity.    Most recent CT:   6/28/2022  Cardiomegaly. No significant pericardial effusion.Scattered posterior debris in the distal trachea and left mainstem bronchus. Bronchial wall thickening. Diffuse smooth interlobular septal thickening. Scattered bronchocentric groundglass opacities and few nodular opacities. Expiration phase imaging. Mucous plugging in  the lower lobes. Bronchovascular thickening. Small bilateral pleural effusions with associated atelectatic changes.    Patient has history of mucous plugging, pleural effusions, and atelectasis as seen on her last CT scan from 11/23/2019.      Annual Influenza/ Pneumococcal Vaccination/COVID:  She has not had her Influenza vaccine this year.She has had her COVID vaccine and one booster. She  is up to date with Pneumococcal vaccine.     Pulmonary Rehab History:  None-was given a referral in the past      Home respiratory medications include:   Duoneb Q 6 prn  Albuterol HFA Q 6 prn   Advair Diskus 250-50 mcg BID  Spiriva Handihaler Daily      Assessment: Kim was resting comfortably upon writer's entrance to the room. She was not in distress and did not appear to having shortness of breath. She was on 2 L NC with oxygen saturation 100%.         Action:         Evaluated patients inspiratory flow using In-Check device:     --Low resistance setting: Patient able to generate 10 LPM, which is insufficient inspiratory flow for her Albuterol rescue inhaler.  Albuterol inhalers require a slow inhalation of 20-60 LPM for optimal drug disposition with 5-10 second breath hold.      --Medium resistance setting: Patient able to generate ____LPM, which is sufficient/insufficient inspiratory flow for his/her current _____DPI/MDI. Medium resistance inhalers require a fast and deep inhalation of 30-80LPM with 5-10  second breath hold.   Kim is not able to register a reading with this resistance; therefore, she is unable to use her home Advair.     --High resistance setting: Patient able to generate ___LPM, which is sufficient/insufficient inspiratory flow for his/her current _____DPI/MDI. High resistance inhalers require a fast and deep inhalation of 30-80LPM with 5-10 second breath hold.   Kim is not able to register a reading with this resistance; therefore, she is unable to use her home Spiriva Handihaler.     -Evaluated patients' coordination and technique with inhaler: Patient is not able to generate enough inspiratory flow for any of her inhalers and is also not able to completely follow directions. Kim will need to be discharged to nursing home with all nebulizer treatments--NO INHALERS       -Patient is unable to follow instructions for use of the Aerobika flutter valve.    Recommendations:  -Discontinue use of the Incruse and Breo as an inpatient as she does not have the necessary inspiratory flow to effectively use these inhalers.    -Begin Perforomist and Pulmicort nebs BID as inpatient.     -Will need follow up appointment with Pulmonologist and complete PFT's. Patient has not seen a pulmonologist for three years. Was a previous patient of Dr. Packer and also saw Dr. Funez for one follow up visit in the past. Unsure if patient has the ability to travel from nursing home to pulmonary clinic for visits.    -Smoking Cessation Counseling and Relapse Prevention Consult (Patient was down to 4 cigarettes/day when this writer saw her for smoking cessation counseling three years ago) Patient can not follow questions regarding whether she continues to smoke or not.    -Discharge with prescription for Perforomist and Pulmicort nebs twice a day as she is unable to use inhalers. Also give prescription for Duonebs prn for rescue medications.      -Patient needs nebulizer compressor at discharge with prescription  "for tubing, cups and mask. Per insurance requirements, Physician documentation in F2F notes needs to include dx diagnosis and need for nebulizer and medication frequency.    -Home Oxygen Assessment Testing 24-48 hours prior to discharge to determine O2 needs at rest and with exertion/activity. If the patient requires oxygen at rest, the walk test must be performed using the new baseline O2 to determine if patient needs more oxygen with activity.   -In the event patient qualifies for oxygen, at rest or with activity, please use the following verbiage in oxygen order: \"Please provide portable oxygen concentrator AND please test for oxygen conserving device using ____LPM to maintain sats between ____)      Will continue to follow and support patient as needed.     I spent 15 minutes with the patient.    JAME Xiong, RRT, CTTS  Chronic Pulmonary Disease Specialist  Office: [phone]   Pager: [phone]             "

## 2022-06-30 NOTE — PLAN OF CARE
"Goal Outcome Evaluation:    BP (!) 148/41 (BP Location: Right leg)   Pulse 54   Temp 98.3  F (36.8  C)   Resp 14   Ht 1.651 m (5' 5\")   Wt 86.6 kg (190 lb 14.7 oz)   SpO2 90%   BMI 31.77 kg/m        Neuro: A&Ox 3  Cardiac: SR. VSS.       Respiratory: Sating 90%> on 1  GI/: No urinal output pt is on HD through rt VAD.  BM X1  Diet/appetite: Tolerating renal diet. Eating well.  Activity:  Assist of 2, up to chair and in halls.  Pain: At acceptable level on current regimen.   Skin: No new deficits noted.  LDA's:one piv     Plan: Continue with POC. Notify primary team with changes.             "

## 2022-06-30 NOTE — PROGRESS NOTES
GENERAL ID SERVICE CONSULTATION     Patient:  Kim Anne   Date of birth 1945, Medical record number [id number]  Date of Visit:  06/30/2022  Date of Admission: 6/27/2022  Consult Requester: Debra Greenberg MD          Assessment and Recommendations:   ASSESSMENT:  1. Strep mitis bacteremia, 1 of 2 sets, unclear source  - TTE without evidence of vegetations  - Poor dentition noted and CT with mandibular peridontitis, but Dental consulted without active concern  - R arm AV fistula without surface evidence of infection    2.   ESRD, on HD  3.   CAD, diastolic heart failure, tricuspid valve regurgitation  4.   Dementia, sister is legal guardian    RECOMMENDATION:  -- Change to ceftriaxone; give 2g daily while in hospital, then at discharge 2g thrice weekly after dialysis   - Anticipate 14 days of therapy (6/27 to 7/10)   - Check CBC, CMP, and CRP once next week    -- Appreciate dental evaluation - no current concerns  -- Recommend U/S of AV fistula to rule out abscess (less likely given lack of fevers, WBC, and benign clinical exam) -- pending  -- Patient without other signs/symptoms of endocarditis. TTE (which was re-read by cards looking for vegetations and of good quality) reassuring. I think we can defer a MADELAINE for now unless positive culture persists or other concern for endocarditis arises.    -- Follow up pending BCx      DISCUSSION:   75yo M with ESRD on hemodialysis through R arm AV fistula presents after hypotension during dialysis, respiratory decompensation, and found to have Strep mitis bacteremia. She is actually quite clinically stable and has been afebrile with normal WBC. I suspect this is a relatively low grade bacteremia. Differential for sources include teeth, AV fistula site (benign on exam), and aspiration (imaging more consistent with pulm edema). Endocarditis is considered but deemed less likely given acute presentation, negative TTE, and single culture set so far positive. She  does have native valves. Dental CT obtained which showed an area of mandibular peridontitis but no emily abscess. Dental consults and did not recommend any further interventions.    Strep mitis is pan-susceptible. Ceftriaxone would be the preferred agent given ease of dosing with dialysis. Anticipate 14 days of therapy, unless clinical pictures evolves. Reasonable to get U/S of fistula site to rule out abscess while here - currently pending.      Thank you for this consult. ID will continue to follow.     Arnav Chery MD  Infectious Diseases  [phone]  ________________________________________________________________    INTERVAL HX: Slept poorly. Still having some borderline low BP. On 2L NC. More alert and oriented when I saw her today. She knew person, place, and date.         History of Present Illness:   History obtained primarily from chart review  Kim Anne is a 77 yo woman with past medical history of dementia (legal guardian Vandana, sister), ESRD 2/2 diabetic nephropathy on HD, CAD with HFpEF. Patient reports feeling normal state of health in past few days and leading up to dialysis today. Was dialyzing with goal for 3 L fluid removal, became hypotensive to systolics 60s/70s, asymptomatic without any reported SOB or lightheadedness. She was then sent to the ED.    In the hospital the patient has been noted to having respiratory failure requiring BiPAP. No fevers. No WBC. Normal lactate. However, blood cultures drawn on admission show 1 of 2 sets positive for Strep mitis. Started initially on vancomycin and zosyn. She has now been narrowed down to unasyn.    During my interview, she did not have any significant complaints. She denies tooth pain, pain at the fistula site, chest or back pain, or any swollen joints. No abdominal symptoms. She is not currently complaining about breathing. No fevers or chills.            Review of Systems:   Complete 5pt ROS performed, see HPI for pertinent  findings.         Current Medications:       acetylcysteine  4 mL Nebulization BID     ampicillin-sulbactam (UNASYN) IV  3 g Intravenous Q24H     aspirin  81 mg Oral At Bedtime     atorvastatin  40 mg Oral At Bedtime     heparin ANTICOAGULANT  5,000 Units Subcutaneous Q12H     ipratropium - albuterol 0.5 mg/2.5 mg/3 mL  3 mL Nebulization 2 times daily     levothyroxine  175 mcg Oral QAM AC     melatonin  3 mg Oral At Bedtime     midodrine  15 mg Oral TID w/meals     pantoprazole  40 mg Oral BID     QUEtiapine  25 mg Oral At Bedtime     sodium chloride (PF)  3 mL Intravenous Q8H     sodium chloride (PF)  3 mL Intracatheter Q8H            Allergies:     Allergies   Allergen Reactions     Contrast Dye Hives and Itching     Clonidine      She had as IP and thinks it made her itchy     Diatrizoate Other (See Comments)     Diltiazem      Severe bradycardia     Iodine-131             Physical Exam:   Vitals were reviewed  Patient Vitals for the past 24 hrs:   BP Temp Temp src Pulse Resp SpO2 Weight   06/30/22 1216 -- -- -- -- -- -- 86.6 kg (190 lb 14.7 oz)   06/30/22 1205 120/47 -- -- 53 12 99 % --   06/30/22 1200 112/51 97.8  F (36.6  C) Oral 55 12 98 % --   06/30/22 1130 109/41 -- -- 53 16 100 % --   06/30/22 1115 132/47 -- -- 51 10 100 % --   06/30/22 1100 113/57 -- -- 50 11 100 % --   06/30/22 1045 130/52 -- -- 53 15 100 % --   06/30/22 1030 (!) 151/35 -- -- 60 17 100 % --   06/30/22 1015 (!) 127/33 -- -- 54 14 100 % --   06/30/22 1000 138/62 -- -- 54 12 100 % --   06/30/22 0945 116/88 -- -- 53 12 100 % --   06/30/22 0930 (!) 124/39 -- -- 55 13 100 % --   06/30/22 0915 118/41 -- -- 56 15 100 % --   06/30/22 0900 (!) 107/31 -- -- 51 12 100 % --   06/30/22 0855 113/42 -- -- 96 12 100 % --   06/30/22 0845 (!) 73/41 -- -- 53 12 100 % --   06/30/22 0830 (!) 125/39 -- -- 52 13 100 % --   06/30/22 0826 115/44 -- -- 54 30 100 % --   06/30/22 0815 128/52 -- -- 54 16 -- --   06/30/22 0800 -- -- -- 55 -- 90 % --   06/30/22 0752  -- -- -- -- -- 91 % --   06/30/22 0721 114/48 97.5  F (36.4  C) Oral 55 16 (!) 88 % --   06/30/22 0337 (!) 106/31 98.6  F (37  C) Oral 53 16 100 % --   06/30/22 0048 -- -- -- -- -- -- 89.6 kg (197 lb 8.5 oz)   06/29/22 2328 117/45 98.3  F (36.8  C) Oral 52 18 100 % --   06/29/22 2151 122/41 -- -- 53 -- 98 % --   06/29/22 1951 -- -- -- -- -- 100 % --   06/29/22 1914 (!) 87/40 97.3  F (36.3  C) Oral 54 18 98 % --   06/29/22 1831 99/52 -- -- 53 -- -- --   06/29/22 1630 (!) 86/41 -- -- 51 -- 100 % --   06/29/22 1513 118/41 98  F (36.7  C) Oral 52 16 100 % --       Physical Examination:  GENERAL: Well-developed, well-nourished, in bed in no acute distress. More oriented today.  HEENT:  Head is normocephalic, atraumatic. On NC.   EYES:  Eyes have anicteric sclerae without conjunctival injection or stigmata of endocarditis.    NECK:  Supple. No cervical lymphadenopathy  LUNGS:  Clear to auscultation bilateral. Normal effort.  CARDIOVASCULAR:  Regular rate and rhythm with no murmurs, gallops or rubs.  ABDOMEN:  Normal bowel sounds, soft, nontender. Obese.  SKIN:  No acute rashes. No stigmata of endocarditis. AV fistula site examined and no evidence of erythema, induration, or fluctuance.  NEUROLOGIC:  Grossly nonfocal. Active x4 extremities.         Laboratory Data:     Inflammatory Markers    Recent Labs   Lab Test 11/23/19  1003 05/29/19  0452   SED 26  --    CRP 8.9* 38.0*       Hematology Studies    Recent Labs   Lab Test 06/30/22  0537 06/29/22  0552 06/28/22  1932 06/28/22  0841 06/28/22  0454 06/27/22  1612 06/27/22  1527 06/27/22  1128 10/20/21  0855 06/25/21  1203 06/03/21  0349 06/02/21  1308 02/28/20  0548 02/26/20  0454 02/25/20  1828 02/25/20  1507 02/25/20  0955 02/06/20  1151   WBC 6.6 4.7  --   --  5.5 5.0 5.1 4.6   < > 6.3   < > 7.7   < > 5.9  --  7.4 7.8 7.6   ANEU  --   --   --   --   --   --   --   --   --  5.2  --  7.2  --  3.6  --  4.2 4.9 5.6   AEOS  --   --   --   --   --   --   --   --   --  0.2  --   0.0  --  0.3  --  0.5 0.4 0.4   HGB 7.6* 7.2* 7.2* 7.1* 6.9* 8.6* 8.5* 7.7*   < > 6.4*   < > 7.5*   < > 8.3*   < > 9.0* 8.4* 10.1*   * 113*  --   --  111* 108* 109* 111*   < > 105*   < > 112*   < > 107*  --  106* 106* 103*    216  --   --  232 257 246 248   < > 355   < > 194   < > 352  --  376 346 305    < > = values in this interval not displayed.       Metabolic Studies     Recent Labs   Lab Test 06/30/22  0822 06/30/22  0537 06/29/22  0552 06/28/22  0454 06/27/22  1612   * 132* 134* 132* 130*   POTASSIUM 4.1 5.1 4.7 5.3 4.9   CHLORIDE 97* 97* 97* 96* 94*   CO2 27 25 26 25 26   BUN 25.7* 31.1* 25.2* 32.1* 24.8*   CR 2.44* 2.93* 2.29* 2.81* 2.40*   GFRESTIMATED 20* 16* 21* 17* 20*       Hepatic Studies    Recent Labs   Lab Test 06/27/22  1612 06/27/22  1128 10/20/21  0855 06/25/21  1203 06/03/21  0533 06/02/21  1308   BILITOTAL 0.5 0.4 0.7 0.4 0.4 0.3   ALKPHOS 312* 346* 276* 275* 228* 258*   ALBUMIN 3.6 3.0* 2.9* 2.7* 2.6* 2.9*   AST 37* 23 32 41 17 31   ALT 13 14 14 36 20 20       Microbiology:  Culture   Date Value Ref Range Status   06/28/2022 No growth after 1 day  Preliminary   06/28/2022 No growth after 1 day  Preliminary   06/27/2022 Positive on the 1st day of incubation (A)  Final   06/27/2022 Streptococcus mitis group (AA)  Final     Comment:     2 of 2 bottles     Urine Studies    Recent Labs   Lab Test 02/25/20  1152 11/23/19  1235 05/01/19  2258 05/16/18  1030 10/25/17  0640   LEUKEST Negative Negative Negative Negative Negative   WBCU 1 2 <1 1 3*       Vancomycin Levels    Recent Labs   Lab Test 11/26/19  0609 11/25/19  0238 11/23/19  1003   VANCOMYCIN 18.4 19.5 11.9       Hepatitis B Testing   Recent Labs   Lab Test 06/28/22  1205 06/04/21  1335 11/26/19  0855 05/03/19  0625 05/02/19  1647 10/25/17  0639   HBCAB  --   --   --   --   --  Nonreactive   HEPBANG Nonreactive Nonreactive   < >  --    < > Nonreactive   HBCM  --   --   --  Nonreactive  --   --     < > = values in this  interval not displayed.     Hepatitis C Testing     Hepatitis C Antibody   Date Value Ref Range Status   2019 Nonreactive NR^Nonreactive Final     Comment:     Assay performance characteristics have not been established for newborns,   infants, and children     10/25/2017 Nonreactive NR^Nonreactive Final     Comment:     Assay performance characteristics have not been established for newborns,   infants, and children       Recent Results (from the past 4320 hour(s))   Echocardiogram Complete   Result Value    LVEF  55-60%    [facility]    [id number]  [id number]  [id number]  [id number]^FARHAT^SELINA^KAMLA     [facility]  Echocardiography Laboratory  [address] [place] [address]     Name: JONEL CHAVEZ  MRN: [id number]  : 1945  Study Date: 2022 09:43 AM  Age: 76 yrs  Gender: Female  Patient Location: [facility]  Reason For Study: Dyspnea  Ordering Physician: SELINA DOUGHERTY  Performed By: Stanislaw Lozada     BSA: 2.0 m2  Height: 65 in  Weight: 200 lb  ______________________________________________________________________________  Procedure  Echocardiogram with two-dimensional, color and spectral Doppler performed.  ______________________________________________________________________________  Interpretation Summary  Global and regional left ventricular function is normal with an EF of 55-60%.  Grade III or advanced diastolic dysfunction with relative wall thickness is  increased consistent with concentric remodeling.  The RV function is low-normal. The right ventricle is normal size.  Moderate to severe tricuspid insufficiency is present.  The PA systolic pressure is at least 84 mmHg.  No pericardial effusion is present.  IVC diameter >2.1 cm collapsing <50% with sniff suggests a high RA pressure  estimated at 15 mmHg or greater.  Compared to the study from 6/3/2021, the PA systolic pressure has increased  and the tricuspid regurgitation has increased modestly in  severity. The RV  function is similar.  ______________________________________________________________________________  Left Ventricle  Global and regional left ventricular function is normal with an EF of 55-60%.  Left ventricular size is normal. Relative wall thickness is increased  consistent with concentric remodeling. Grade III or advanced diastolic  dysfunction. Diastolic Doppler findings (E/E' ratio and/or other parameters)  suggest left ventricular filling pressures are increased. No regional wall  motion abnormalities are seen.     Right Ventricle  The right ventricle is normal size. The RV function is low-normal.     Atria  The right atria appears normal. Mild left atrial enlargement is present.     Mitral Valve  Mild mitral annular calcification is present. Mild mitral insufficiency is  present.     Aortic Valve  The aortic valve is tricuspid. Mild aortic valve calcification is present. On  Doppler interrogation, there is no significant stenosis or regurgitation.     Tricuspid Valve  Moderate to severe tricuspid insufficiency is present. RVSP estimated at  atleast 84mmHg, consistent with severe Pulmonary HTN.     Pulmonic Valve  Mild pulmonic insufficiency is present.     Vessels  Sinuses of Valsalva 3.6 cm. Ascending aorta 2.7 cm. IVC diameter >2.1 cm  collapsing <50% with sniff suggests a high RA pressure estimated at 15 mmHg or  greater.     Pericardium  No pericardial effusion is present.     Compared to Previous Study  This study was compared with the study from 6/3/2021 . The PA systolic  pressure has increased and the tricuspid regurgitation has increased modestly  in severity. The RV function is similar.  ______________________________________________________________________________  MMode/2D Measurements & Calculations  RVDd: 3.8 cm  IVSd: 1.2 cm  LVIDd: 4.0 cm  LVIDs: 2.0 cm  LVPWd: 1.2 cm  FS: 51.4 %  LV mass(C)d: 170.7 grams  LV mass(C)dI: 86.3 grams/m2  Ao root diam: 3.6 cm  asc Aorta Diam:  2.7 cm  LVOT diam: 1.7 cm  LVOT area: 2.3 cm2  LA Volume (BP): 77.6 ml     LA Volume Index (BP): 39.2 ml/m2  RWT: 0.61     Doppler Measurements & Calculations  MV E max alberto: 133.0 cm/sec  MV A max alberto: 52.6 cm/sec  MV E/A: 2.5  MV dec slope: 830.0 cm/sec2  TR max alberto: 372.0 cm/sec  TR max P.1 mmHg  E/E' av.8  Lateral E/e': 38.2  Medial E/e': 19.4     ______________________________________________________________________________  Report approved by: Enrique Rodas 2022 01:21 PM

## 2022-06-30 NOTE — PROGRESS NOTES
Nephrology Progress Note  06/30/2022         Assessment & Recommendations:   ESRD 2/2 diabetic nephropathy on HD MWF  Solitary right kidney s/p donation (1988)  The patient dialyzes MWF at [facility] in [place]. CELSA AVF. Primary nephrologist is Dr. Linus POWERS 85 kg. She is not active on the transplant list. The patient was in dialysis unit getting the dialysis but became hypotensive so was brought into the hospital  - Patient blood pressure improved today after starting antibiotic.  -HD today removed 3 liter    Septic shock and streptococcus mitis bacteriemia :(improving) Patient is growing Strep Mitis  -Blood culture repeated. Patient also has ascites consider tapping it to rule out SBP.Patient dialysis unit reports her blood pressure is 110's         Acute hypoxic respiratory failure secondary to aspiration Pneumonia and Fluid overload:  -remove 3 liter today in HD     Anemia of chronic disease  Epogen 15,000 international unit hemoglobin of 7.1.Patient also get IV iron 100 mg will hold it because of infection  -Consider outpatient GI referral for evaluation of chronic GI blood loss    BMD  -on Hectoral 1.5 mcg      Recommendations were communicated to primary team via notes  Seen and discussed with Dr. Palak Lopez MD   Division of Renal Disease and Hypertension  [facility]  [email or web address]  Vocera Web Console    Interval History :   Nursing and provider notes from last 24 hours reviewed.  In the last 24 hours Kim Anne had hypotension resolve getting dialysis today  Review of Systems:   I reviewed the following systems:  GI: decrease appetite. No nausea or vomiting or diarrhea.   Neuro:  No confusion  Constitutional:  No fever or chills  CV: posittive dyspnea or edema.  Nochest pain.    Physical Exam:   I/O last 3 completed shifts:  In: 1010 [P.O.:1000; I.V.:10]  Out: 3000 [Other:3000]   BP (!) 148/41 (BP Location: Right leg)   Pulse 54   Temp 98.3  F (36.8  C)   Resp 14   Ht 1.651  "m (5' 5\")   Wt 86.6 kg (190 lb 14.7 oz)   SpO2 94%   BMI 31.77 kg/m       GENERAL APPEARANCE: Looks sepitic  EYES:  No scleral icterus, pupils equal  PULM: lungs clear to auscultation bilaterally, equal air movement, no clubbing  CV: Patient has regular rhythm, normal rate, no rub     -JVD -ve     -edema -ve   GI: soft, non tender, non distended, bowel sounds are present  INTEGUMENT: no cyanosis, no rash  NEURO:  No asterixis   Access Right fistula    Labs:   All labs reviewed by me  Electrolytes/Renal -   Recent Labs   Lab Test 06/30/22  0822 06/30/22  0537 06/29/22  0552 06/27/22  1126 10/20/21  0855 06/02/21  1308 02/29/20  0441 02/27/20  0455 02/26/20  0454 01/29/20  0541 01/29/20  0003 01/27/20  1000 01/27/20  0420   * 132* 134*   < > 138   < > 136   < > 140   < >  --    < > 137   POTASSIUM 4.1 5.1 4.7   < > 3.9   < > 4.5   < > 4.6  4.6   < > 4.2   < > 4.3   CHLORIDE 97* 97* 97*   < > 101   < > 105   < > 106   < >  --    < > 106   CO2 27 25 26   < > 30   < > 24   < > 25   < >  --    < > 27   BUN 25.7* 31.1* 25.2*   < > 37*   < > 32*   < > 30   < >  --    < > 15   CR 2.44* 2.93* 2.29*   < > 3.73*   < > 4.94*   < > 3.21*   < >  --    < > 1.69*   * 125* 119*   < > 158*   < > 107*   < > 204*   < >  --    < > 134*   FRANCES 8.4* 8.6* 8.3*   < > 8.7   < > 8.2*   < > 8.8   < >  --    < > 9.3   MAG  --   --   --   --  1.7  --   --   --   --   --  1.6  --  1.9   PHOS  --   --   --   --   --   --  4.6*  --  3.3  --  2.5  --  2.8    < > = values in this interval not displayed.       CBC -   Recent Labs   Lab Test 06/30/22  0537 06/29/22  0552 06/28/22  1932 06/28/22  0841 06/28/22  0454   WBC 6.6 4.7  --   --  5.5   HGB 7.6* 7.2* 7.2*   < > 6.9*    216  --   --  232    < > = values in this interval not displayed.       LFTs -   Recent Labs   Lab Test 06/27/22  1612 06/27/22  1128 10/20/21  0855   ALKPHOS 312* 346* 276*   BILITOTAL 0.5 0.4 0.7   ALT 13 14 14   AST 37* 23 32   PROTTOTAL 6.9 6.1* 7.2 "   ALBUMIN 3.6 3.0* 2.9*       Iron Panel -   Recent Labs   Lab Test 06/28/22  0454 06/03/21  0533 06/03/21  0349 02/25/20  0955   IRON 35*  --  27* 47   IRONSAT 16  --  7* 20   *   < >  --  1,088*    < > = values in this interval not displayed.         Imaging:  All imaging studies reviewed by me.     Current Medications:    acetylcysteine  4 mL Nebulization BID     aspirin  81 mg Oral At Bedtime     atorvastatin  40 mg Oral At Bedtime     cefTRIAXone  2 g Intravenous Q24H     heparin ANTICOAGULANT  5,000 Units Subcutaneous Q12H     ipratropium - albuterol 0.5 mg/2.5 mg/3 mL  3 mL Nebulization 2 times daily     levothyroxine  175 mcg Oral QAM AC     melatonin  3 mg Oral At Bedtime     midodrine  15 mg Oral TID w/meals     pantoprazole  40 mg Oral BID     QUEtiapine  25 mg Oral At Bedtime     sodium chloride (PF)  3 mL Intravenous Q8H     sodium chloride (PF)  3 mL Intracatheter Q8H       - MEDICATION INSTRUCTIONS -       - MEDICATION INSTRUCTIONS -       - MEDICATION INSTRUCTIONS -       Oni Lopez MD

## 2022-06-30 NOTE — PROGRESS NOTES
"Hemodialysis Progress Note    I personally reviewed the vital signs, medications, labs, and imaging.  Subjective: I examined the patient during dialysis.  Patient denies any complaint.  She is the most alert that I have present since she was admitted.  She denies any chest pain or short of breath.    Objective:  /47   Pulse 53   Temp 97.8  F (36.6  C) (Oral)   Resp 12   Ht 1.651 m (5' 5\")   Wt 86.6 kg (190 lb 14.7 oz)   SpO2 99%   BMI 31.77 kg/m      Intake/Output Summary (Last 24 hours) at 6/30/2022 1412  Last data filed at 6/30/2022 1200  Gross per 24 hour   Intake 520 ml   Output 3000 ml   Net -2480 ml       My key history or physical exam findings:   GA: Alert and conversant  Heart: RRR  Lung: Clear  Abdomen: Soft with mild distended  MSK: Only trace pitting edema    Problem list &Plan  # IDH; resolved  # ESKD MWF   # Volume overload  We are able to remove 3 L during dialysis today without any problem with the blood pressure.  Likely she has hypotension due to strep mitis bacteremia but now seems to get better. Next dialysis will be Saturday.  # Anemia secondary to CKD   # Iron deficiency anemia  Would continue Epogen. No iron due to bacteremia.  # S. mitits bacteremia  # Hypotension  Now her blood pressure has improved and we are about to remove more fluid    Time spent 25 minutes on this date of encounter for chart review, history taking, physical exam, medical decision making and co-ordination of care on this date of encounter.   Steve Cid  Date of Service (when I saw the patient): June 30, 2022    "
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
## 2022-06-30 NOTE — PROVIDER NOTIFICATION
9:56PM  6B 36-2. Kim Anne.   FYI: per tele, patient having bigeminal PACs. HR 52. /41 (left leg). MAP 62. SpO2 98% on 2L. Pt denies dizziness, SOB, or chest pain.     10:03PM   Provider called back. Will make note and let primary team know. No new orders or nursing actions at this time.

## 2022-06-30 NOTE — PROGRESS NOTES
Resident/Fellow Attestation   I, Scar Franz MD, was present with the medical/JILLIAN student who participated in the service and in the documentation of the note.  I have verified the history and personally performed the physical exam and medical decision making.  I agree with the assessment and plan of care as documented in the note.      Scar Franz MD  PGY2  Date of Service (when I saw the patient): 06/30/22    [facility]    Progress Note - Medicine Service, MAROON TEAM 1       Date of Admission:  6/27/2022    Assessment & Plan        Kim Anne is a 76 year old female admitted on 6/27/2022. She has a history of dementia (legal dax Ross, sister), ESRD 2/2 diabetic nephropathy on HD, CAD, history of NSTEMI, HFpEF and is admitted for hypotension from dialysis and acute hypoxic and hypercapnic respiratory failure.     Updates:  - BP significantly improved- will go back to midodrine 10mg TID  - Communicated with nursing to maintain SpO2 at 88-92% given COPD   - Repeat blood culture with no growth after 24 hours  - Dental hygiene consulted and cleared from a dental perspective   - Susceptibilities on Strep mitis positive blood cultures returned, change to ceftriaxone  - US of AV fistula to rule out abscess f/u  - Discontinued Incruse and Breo inhaler and switched to DuoNeb due to poor inspiration  - Tolerated HD and UF with 3L removal     # Acute hypoxic and hypercapnic respiratory failure-Improving  # History HFpEF  Etiology is likely to be secondary to CAP and/or volume overload in setting of HFpEF and ESRD. CAP/sepsis is more likely as blood culture showed gram positive cocci in pairs and chains, streptococcus. CT with evidence of mucus plugging/possible aspiration pneumonia. HFpEF is also possible as she presented with pulmonary edema and lower extremity edema. ECHO results show EF 55-60%, increased fluid with plethoric IVC without respiratory  variation, left ventricular hypertrophy, increased RA pressure and increased tricuspid regurgitation. PE is very unlikely as the CT was negative for any evidence of PE.  - Metaneb and mucomyst with albuterol nebs to help with airway clearance  - BiPAP encouraged to improve ventilation overnight as VBG showed respiratory acidosis  - Transition from DuoNeb to long acting Neb, Performist and Pulmicort once patient is improving  -Pulmonology referral prior to discharge  - Repeat VBG if clinical decompensation or worsening mental status  - Abxs as below     # Streptococcus Mitis bacteremia   # Concern for sepsis 2/2 community acquired pneumonia vs. Aspiration pneumonia  ABx: Unasyn switched to ceftriaxone after susceptibilities returned  Blood culture returned positive for gram positive cocci in pairs and chains, specifically strep mitis  Blood Culture with no growth after 24 hours  Repeat Blood culture tomorrow  Dental Consulted and she was cleared from a dental perspective, no abscess noted  ID consulted for assessment of streptococcus bacteremia  Add vancomycin if patient decompensates  MRSA nares: negative    # ESRD on HD M/W/F  EDW reported 81 kg, recent post run weights closer to 84 to 85 kg, presented here at 90.5 kg though with fluid removal in dialysis became hypotensive.   - Nephro consult for HD  - Dialyzes MWF through RUE fistula  - Does make some urine     # Hx CAD  Low concern for ACS due to no chest pain throughout hospitalization. Improved shortness of breath with BiPAP and O2 supplementation.   - PTA atorvastatin  - PTA ASA     # Chronic macrocytic anemia  # reported history GI bleed?  Presented at hgb 8.5, consistent with chronic anemia. Prior B12 and folate levels wnl, likely has chronic disease component. Reported history of GI bleeding though not definitive, patient denies though history unreliable, will monitor while on heparin. Hgb has remained stable at 7.2. Iron studies returned suggestive of  "anemia of chronic disease.  - PTA PPI  - CBC f/u, transfuse if hgb<7  - Continue Epogen  - Hold iron with next dialysis run due to possible infection per nephrology     # COPD  - continue PTA LAMA/LABA/ICS therapy with albuterol prn  - consulted respiratory therapy for COPD education and optimization of therapy     # Hypothyroidism  - T4 levels wnl, will continue PTA levothyroxine 175 mcg daily      # Dementia  Alternative decision maker is sister, Vandana. Lives with her at home.  Update Vandana daily with plan  Seroquel at bedtime to help with delirium  Delirium precautions in place        Diet: Renal Diet (dialysis)    DVT Prophylaxis: Heparin subcutaneous  Chaney Catheter: Not present  Fluids: none  Central Lines: None  Cardiac Monitoring: ACTIVE order. Indication: Bradycardias (48 hours)  Code Status: Full Code      Disposition Plan     Expected Discharge Date: 07/05/2022                The patient's care was discussed with the Attending Physician, Dr. Greenberg .    Delbert Castro  Medical Student  Medicine Service, [address]  Securely message with the Vocera Web Console (learn more here)  Text page via [facility] Paging/Directory   Please see signed in provider for up to date coverage information      Clinically Significant Risk Factors Present on Admission               # Obesity: Estimated body mass index is 31.77 kg/m  as calculated from the following:    Height as of this encounter: 1.651 m (5' 5\").    Weight as of this encounter: 86.6 kg (190 lb 14.7 oz).       ______________________________________________________________________    Interval History   Kim had improved hypotension overnight after albumin x1 and 15 mg midodrine. She had a few coupling PACs reported by nursing. This morning, she feels the same as yesterday, but more tired as she did not sleep well. She does have a cough. She also reports feeling hungry.     Review of systems: " Patient reports having a cough. She remained afebrile and denied any shortness of breath, abdominal pain, dizziness, or chest pain.     Data reviewed today: I reviewed all medications, new labs and imaging results over the last 24 hours. I personally reviewed   Physical Exam   Vital Signs: Temp: 97.8  F (36.6  C) Temp src: Oral BP: 120/47 Pulse: 53   Resp: 12 SpO2: 99 % O2 Device: Nasal cannula Oxygen Delivery: 2 LPM  Weight: 190 lbs 14.69 oz  Constitutional: Awake, sitting in bed  Eyes: No scleral icterus   ENT: atraumatic, without obvious abnormality  Respiratory: good air exchange, clear lung sounds, cough during exam  Cardiovascular:  Bradycardia, regular rhythm and no murmur noted  GI: soft, non-distended, non-tender  Skin: LE abrasions, appear dry and healing well. Bruises on left forearm near previous IV placement. Fistula appears dry without drainage.   Musculoskeletal: Trace lower extremity edema. Able to move upper and lower extremities. Able to roll on side for respiratory exam  Neurologic: slightly somnolent, alert, oriented x4    Data  Recent Labs   Lab 06/30/22  0822 06/30/22  0537 06/29/22  0552 06/28/22  1932 06/28/22  0841 06/28/22  0454 06/27/22  1612 06/27/22  1527 06/27/22  1128   WBC  --  6.6 4.7  --   --  5.5 5.0   < > 4.6   HGB  --  7.6* 7.2* 7.2*   < > 6.9* 8.6*   < > 7.7*   MCV  --  116* 113*  --   --  111* 108*   < > 111*   PLT  --  252 216  --   --  232 257   < > 248   * 132* 134*  --   --  132* 130*  --  132*   POTASSIUM 4.1 5.1 4.7  --   --  5.3 4.9  --  4.0   CHLORIDE 97* 97* 97*  --   --  96* 94*  --  96*   CO2 27 25 26  --   --  25 26  --  29   BUN 25.7* 31.1* 25.2*  --   --  32.1* 24.8*  --  21.8   CR 2.44* 2.93* 2.29*  --   --  2.81* 2.40*  --  2.22*   ANIONGAP 8 10 11  --   --  11 10  --  7   FRANCES 8.4* 8.6* 8.3*  --   --  8.0* 8.7*  --  7.8*   * 125* 119*  --    < > 144* 130*  --  124*   ALBUMIN  --   --   --   --   --   --  3.6  --  3.0*   PROTTOTAL  --   --   --   --    --   --  6.9  --  6.1*   BILITOTAL  --   --   --   --   --   --  0.5  --  0.4   ALKPHOS  --   --   --   --   --   --  312*  --  346*   ALT  --   --   --   --   --   --  13  --  14   AST  --   --   --   --   --   --  37*  --  23    < > = values in this interval not displayed.     Recent Results (from the past 24 hour(s))   CT Dental wo Contrast    Narrative    CT DENTAL WO CONTRAST 6/29/2022 4:14 PM    History:  To assess for dental infection    Comparison:  Head CT 6/27/2022      TECHNIQUE: 3-D reconstruction by the technologists, with curved  multiplanar reformat of thin section imaging through the mandible and  maxilla obtained without intravenous contrast.    FINDINGS:  Exam mildly distorted by motion artifact. Maxillary edentulism. Bony  defect in hard palate along the floor of the nasal cavity.    Multiple mandibular teeth arm is seen. There is a mild periapical  lucency at the left mandibular first premolar and right mandibular  canine. No significant soft tissue swelling or mass. Normal facial  bone alignment. No bony erosion.     Mild scattered mucosal thickening in the paranasal sinuses and  moderate mucosal thickening in the right sphenoid locule. The mastoid  air cells are clear. Mild degenerative changes in the  temporomandibular joints right greater than left.      Impression    IMPRESSION: Small periapical lucency associated with the left  mandibular first premolar and right mandibular canine, suggestive of  periodontitis.     I have personally reviewed the examination and initial interpretation  and I agree with the findings.    FLORES DO MD         SYSTEM ID:  [id number]     Medications    - MEDICATION INSTRUCTIONS -      - MEDICATION INSTRUCTIONS -      - MEDICATION INSTRUCTIONS -        acetylcysteine  4 mL Nebulization BID    ampicillin-sulbactam (UNASYN) IV  3 g Intravenous Q24H    aspirin  81 mg Oral At Bedtime    atorvastatin  40 mg Oral At Bedtime    heparin ANTICOAGULANT  5,000 Units  Subcutaneous Q12H    ipratropium - albuterol 0.5 mg/2.5 mg/3 mL  3 mL Nebulization 2 times daily    levothyroxine  175 mcg Oral QAM AC    melatonin  3 mg Oral At Bedtime    midodrine  15 mg Oral TID w/meals    pantoprazole  40 mg Oral BID    QUEtiapine  25 mg Oral At Bedtime    sodium chloride (PF)  3 mL Intravenous Q8H    sodium chloride (PF)  3 mL Intracatheter Q8H

## 2022-06-30 NOTE — PLAN OF CARE
Neuro: A&O2-3.   Cardiac: Sinus matthew. SBP 86-99. DBP 40-52. Frequent PACs. See flowsheets and provider notification note.    Respiratory: Sating 98+% on 2L nasal cannula.  GI/: No urine output; pt on HD. No BM this shift.   Diet/appetite: Tolerating renal diet.   Activity:  Assist 2x.   Pain: Patient denied pain throughout shift.   Skin: No new deficits noted.  LDA's:  -2 L PIVs  -R AV fistula    Plan: Continue with POC. Notify primary team with changes.

## 2022-06-30 NOTE — PROGRESS NOTES
HEMODIALYSIS TREATMENT NOTE    Date: 6/30/2022  Time: 12:17 PM    Data:  Pre Wt:   89.6  Desired Wt: 86.6 kg   Post Wt:  86.6  Weight change:  3.0 kg  Ultrafiltration - Post Run Net Total Removed (mL): 3000 mL  Vascular Access Status: patent  Dialyzer Rinse: Streaked, Light  Total Blood Volume Processed: 79.2 L Liters  Total Dialysis (Treatment) Time: 3.5 Hours  K2Ca2.5  bfr 400 dfr 600  15g x 2 used    Lab:   No    Interventions:   pt tolerated HD treatment and UF pull of 3.0L. pt received Epo ivp- MAR.  DFFB 600. Pt tolerated 3.5 hr tx. Pt ate full breakfast during tx.   Assessment:  A&oX4. Hr-rrr. 20RPM. Lung sounds coarse. Pt denies complaints since last tx. Pt denies complaints of pain. Pt present w cough and mucus production. Pt spit up mucus. B/t+. hemostasis obtained post tx within 10 minutes      Plan:    Follow-up w renal team and NICOLE Chawla

## 2022-06-30 NOTE — PROGRESS NOTES
06/30/22 1526   Quick Adds   Type of Visit Initial PT Evaluation   Living Environment   People in Home sibling(s)  (Sister, per patient)   Current Living Arrangements extended care facility  (LTC per chart history.)   Living Environment Comments Patient reports she lives in a house with her sister. Patient is an unreliable historian.   Self-Care   Current Activity Tolerance fair   Equipment Currently Used at Home walker, rolling   Activity/Exercise/Self-Care Comment Patient uses walker or furniture to ambulate at home, per patient report. Unknown previous level of function.   General Information   Onset of Illness/Injury or Date of Surgery 06/27/22   Referring Physician Scar Franz MD   Patient/Family Therapy Goals Statement (PT) Return home   Pertinent History of Current Problem (include personal factors and/or comorbidities that impact the POC) Kim Anne is a 75 yo woman with past medical history of dementia (legal guardian Vandana, sister), ESRD 2/2 diabetic nephropathy on HD, CAD, history NSTEMI 75 y.o. female with PMH of dementia with no decision making capacity (has guardian), hypothyroidism, ESRD 2/2 diabetic nephropathy on HD, CAD and suspected NSTEMI without angiographic evaluation 6/2021, HFpEF who presents with hypotension from dialysis.   Existing Precautions/Restrictions fall  (Monitor vitals)   General Observations Patient on 2L O2 nasal canula. VSS   Cognition   Affect/Mental Status (Cognition) confused;low arousal/lethargic   Orientation Status (Cognition) person;place;time;oriented to  (oriented to year, not date)   Follows Commands (Cognition) follows one-step commands   Cognitive Status Comments Hx of dementia   Strength (Manual Muscle Testing)   Strength Comments Global sterength 2-3/5 due to inability to stand independently   Bed Mobility   Bed Mobility Limitations decreased ability to use arms for pushing/pulling;decreased ability to use legs for bridging/pushing   Comment, (Bed  Mobility) MaxAx1 seated scoot; MaxAx2 supine shift with sheet. Supine<>sit mod-maxAx1   Sit-Stand Transfer   Sit-Stand Tift (Transfers) maximum assist (25% patient effort);2 person assist   Comment, (Sit-Stand Transfer) Unable to perform independently, willing to help   Gait/Stairs (Locomotion)   Comment, (Gait/Stairs) Side stepping 2 feet with FWW Da x1-2 for safety, required rest after activity   Balance   Balance other (describe)   Sitting Balance: Static good balance   Standing Balance: Static fair balance  (CGA)   Balance Comments ModAx1 with FWW for standing balance   Balance Quick Add Sitting balance: Static;Standing balance: static   Clinical Impression   Criteria for Skilled Therapeutic Intervention Yes, treatment indicated   PT Diagnosis (PT) Impaired functional mobility   Influenced by the following impairments Marked fatigue, weakness, decreased endurance   Functional limitations due to impairments Bed mobility, standing balance, transfers, gait   Clinical Presentation (PT Evaluation Complexity) Stable/Uncomplicated   Clinical Presentation Rationale Clinical judgement   Clinical Decision Making (Complexity) low complexity   Planned Therapy Interventions (PT) balance training;bed mobility training;home exercise program;patient/family education;strengthening;transfer training;wheelchair management/propulsion training   Anticipated Equipment Needs at Discharge (PT) walker, rolling;wheelchair;wheelchair cushion   Risk & Benefits of therapy have been explained evaluation/treatment results reviewed;care plan/treatment goals reviewed;risks/benefits reviewed;current/potential barriers reviewed;participants voiced agreement with care plan;participants included;patient   PT Discharge Planning   PT Discharge Recommendation (DC Rec) Long term care facility   PT Rationale for DC Rec Patient below baseline mobility. Assistance necessary for transfers and mobility. Assist of 2 needed for bed mobility and  standing at this time. May benefit from ongoing therapy to improve strength and functional mobility. Anticipate return to LTC facility.   PT Brief overview of current status Assist 2 pivot transfer   Total Evaluation Time   Total Evaluation Time (Minutes) 6   Physical Therapy Goals   PT Frequency 5x/week   PT Predicted Duration/Target Date for Goal Attainment 07/15/22   PT Goals Bed Mobility;Transfers;Wheelchair Mobility;Gait   PT: Bed Mobility Modified independent;Supine to/from sit   PT: Transfers Minimal assist;Bed to/from chair;Sit to/from stand   PT: Gait Modified independent;Rolling walker;50 feet   PT: Wheelchair Mobility 150 feet

## 2022-06-30 NOTE — PROGRESS NOTES
Neuro: A&Ox2-3, confused about situation and time intermittently. Slept very little throughout the night.   Cardiac: SB 40-50 with bigeminal PACs. Diastolic pressures still low, 30-50.   Respiratory: Sating 96% on 2L NC; refused CPAP overnight.  GI/: Anuric, on HD 3x week. No BM this shift  Diet/appetite: Tolerating renal diet. Eating well.  Activity:  Assist of 2, up to chair and in halls.  Pain: At acceptable level on current regimen.   Skin: No new deficits noted.  LDA's: PIV x2 saline locked.    Plan: Continue with POC. Notify primary team with changes.

## 2022-07-01 NOTE — PROGRESS NOTES
[facility]    Progress Note - Medicine Service, TED TEAM 1       Date of Admission:  6/27/2022    Assessment & Plan        Kim Anne is a 76 year old female admitted on 6/27/2022. She has a history of dementia (legal umeshdicamryn Ross, sister), ESRD 2/2 diabetic nephropathy on HD, CAD, history of NSTEMI, HFpEF and is admitted for hypotension from dialysis and acute hypoxic and hypercapnic respiratory failure.     Updates:  - BP significantly improved, midodrine decreased to 10mg TID   - Repeat blood culture with no growth after 48 hours  - Contacted Radiology about US of AV fistula,  no concern for abscess on preliminary report.   - Perforomist and Pulmicort started per RT recommendations, DuoNeb PRN  - decreased O2 monitoring from continuous to Q4  - Telemetry showed signs of possible atrial flutter, EKG shortly after w/ NSR w/ 1st degree AV block   Goal SpO2 88-90%  - Vandana (guardian) plans to visit over the weekend. Updated today      # Acute hypoxic and hypercapnic respiratory failure-Improving  # History HFpEF  Etiology is likely to be secondary to CAP and/or volume overload in setting of HFpEF and ESRD. CAP/sepsis is more likely as blood culture showed gram positive cocci in pairs and chains, streptococcus. CT with evidence of mucus plugging/possible aspiration pneumonia. HFpEF is also possible as she presented with pulmonary edema and lower extremity edema. ECHO results show EF 55-60%, increased fluid with plethoric IVC without respiratory variation, left ventricular hypertrophy, increased RA pressure and increased tricuspid regurgitation. PE is very unlikely as the CT was negative for any evidence of PE.    - BiPAP encouraged to improve ventilation overnight as VBG showed respiratory acidosis  - COPD management team involved, appreciate recs   - Performist and Pulmicort BID  -  DuoNeb Q6H PRN  - O2 monitoring Q4  - Repeat VBG if clinical  decompensation or worsening mental status  - Abxs as below     # Streptococcus Mitis bacteremia, unclear source   # sepsis 2/2  Aspiration pneumonia vs bacteremia  - Abx: Ceftriaxone (6/27 - 7/10)for two week course  - Blood culture from 6/27 returned positive for gram positive cocci in pairs and chains, specifically strep mitis  - Repeat blood Culture from 6/28 NGTD  - Repeat blood culture from 6/30 NGTD  - Dental Consulted and she was cleared from a dental perspective, no abscess noted  - TTE re-read by cards -- w/o evidence of endocarditis  - f/u US of AV fistula   - ID consulted for assessment of streptococcus bacteremia  - MRSA nares: negative    # ESRD on HD M/W/F  EDW reported 81 kg, recent post run weights closer to 84 to 85 kg, presented here at 90.5 kg though with fluid removal in dialysis became hypotensive.   - Nephro consulted for HD  - Dialyzes MWF through RUE fistula  - Does make some urine     # Atrial flutter on telemetry  - Telemetry showed signs of possible atrial flutter, EKG obtained shortly after onset (<20 minutes) revealed normal sinus rhythm with 1 degree AV block and no evidence of a flutter/a fib.   - Monitor on telemetry for 24 hours  - f/u mag   - AM mag ordered    # Hx CAD  Low concern for ACS due to no chest pain throughout hospitalization. Improved shortness of breath with BiPAP and O2 supplementation.   - PTA atorvastatin  - PTA ASA     # Chronic macrocytic anemia  # reported history GI bleed?  Presented at hgb 8.5, consistent with chronic anemia. Prior B12 and folate levels wnl, likely has chronic disease component. Reported history of GI bleeding though not definitive, patient denies though history unreliable, will monitor while on heparin. Hgb has remained stable at 7.2. Iron studies returned suggestive of anemia of chronic disease.  - PTA PPI  - CBC f/u, transfuse if hgb<7  - Continue Epogen  - Hold iron with next dialysis run due to possible infection per nephrology     #  COPD  - consulted respiratory therapy for COPD education and optimization of therapy, appreciate recs   - discontinue PTA incruse and PTA breo (does not have necessary inspiratory flow to effectively use these inhalers)  - start perforomist and pumicort nebs BID  - duonebx Q6H PRN for rescue   - will need f/u w/ pulmonologist w/ PFT as outpatient after discharge     # Hypothyroidism  - T4 levels wnl, will continue PTA levothyroxine 175 mcg daily      # Dementia  Alternative decision maker is sister, Vandana. Lives with her at home.  Update Vandana daily with plan  Seroquel at bedtime to help with delirium  Delirium precautions in place        Diet: Renal Diet (dialysis)    DVT Prophylaxis: Heparin subcutaneous  Chaney Catheter: Not present  Fluids: none  Central Lines: None  Cardiac Monitoring: ACTIVE order. Indication: Bradycardias (48 hours)  Code Status: Full Code      Disposition Plan      Expected Discharge Date: 07/05/2022                The patient's care was discussed with the Attending Physician, Dr. Greenberg .    Delbert Castro  Medical Student  Medicine Service, [facility] TEAM [address] [place] [address]  Securely message with the Vocera Web Console (learn more here)  Text page via [facility] Paging/Directory   Please see signed in provider for up to date coverage information    Resident/Fellow Attestation   I, Analisa Swift MD, was present with the medical/JILLIAN student who participated in the service and in the documentation of the note.  I have verified the history and personally performed the physical exam and medical decision making.  I agree with the assessment and plan of care as documented in the note.      Findings and edits incorporated into note    Analisa Swift MD  PGY2  Date of Service (when I saw the patient): 07/01/22    Clinically Significant Risk Factors Present on Admission                      ______________________________________________________________________    Interval History   Kim continued to have improving blood pressures overnight on 10 mg midodrine. She was not able to tolerate BiPAP. She was able to sleep well and has continued to tolerate PO. She reports no pain, no shortness of breath, and an improved cough.     Review of systems: Patient reports having a cough. She remained afebrile and denied any shortness of   breath, abdominal pain, dizziness, or chest pain.     Data reviewed today: I reviewed all medications, new labs and imaging results over the last 24 hours. I personally reviewed       Physical Exam   Vital Signs: Temp: 97  F (36.1  C) Temp src: Axillary BP: (!) 123/31 Pulse: 57   Resp: 20 SpO2: (!) 89 % O2 Device: Nasal cannula Oxygen Delivery: 1/2 LPM  Weight: 199 lbs 4.73 oz  Constitutional: Awake, sitting in bed  Eyes: No scleral icterus   ENT: atraumatic, without obvious abnormality  Respiratory: poor inspiratory effort and low air movement, coarse breath sounds   Cardiovascular:  Bradycardia, regular rhythm and no murmur noted  GI: soft, non-distended, non-tender  Skin: LE abrasions, appear dry and healing well. Bruises on left forearm near previous IV placement. Fistula appears dry without drainage, erythema, or warmth.  Musculoskeletal: Trace lower extremity edema.  Able to roll on side for respiratory exam  Neurologic: slightly somnolent, alert, oriented x3    Data  Recent Labs   Lab 07/01/22  0556 06/30/22  0822 06/30/22  0537 06/29/22  0552 06/28/22  0454 06/27/22  1612 06/27/22  1527 06/27/22  1128   WBC 6.1  --  6.6 4.7   < > 5.0   < > 4.6   HGB 7.6*  --  7.6* 7.2*   < > 8.6*   < > 7.7*   *  --  116* 113*   < > 108*   < > 111*     --  252 216   < > 257   < > 248   * 132* 132* 134*   < > 130*  --  132*   POTASSIUM 4.0 4.1 5.1 4.7   < > 4.9  --  4.0   CHLORIDE 98 97* 97* 97*   < > 94*  --  96*   CO2 25 27 25 26   < > 26  --  29   BUN 20.6 25.7*  31.1* 25.2*   < > 24.8*  --  21.8   CR 2.38* 2.44* 2.93* 2.29*   < > 2.40*  --  2.22*   ANIONGAP 10 8 10 11   < > 10  --  7   FRANCES 8.3* 8.4* 8.6* 8.3*   < > 8.7*  --  7.8*   * 146* 125* 119*   < > 130*  --  124*   ALBUMIN  --   --   --   --   --  3.6  --  3.0*   PROTTOTAL  --   --   --   --   --  6.9  --  6.1*   BILITOTAL  --   --   --   --   --  0.5  --  0.4   ALKPHOS  --   --   --   --   --  312*  --  346*   ALT  --   --   --   --   --  13  --  14   AST  --   --   --   --   --  37*  --  23    < > = values in this interval not displayed.     No results found for this or any previous visit (from the past 24 hour(s)).  Medications     - MEDICATION INSTRUCTIONS -       - MEDICATION INSTRUCTIONS -       - MEDICATION INSTRUCTIONS -         acetylcysteine  4 mL Nebulization BID     aspirin  81 mg Oral At Bedtime     atorvastatin  40 mg Oral At Bedtime     budesonide  0.5 mg Nebulization BID     cefTRIAXone  2 g Intravenous Q24H     formoterol  20 mcg Nebulization Q12H     heparin ANTICOAGULANT  5,000 Units Subcutaneous Q12H     levothyroxine  175 mcg Oral QAM AC     melatonin  3 mg Oral At Bedtime     midodrine  10 mg Oral TID w/meals     pantoprazole  40 mg Oral BID     QUEtiapine  25 mg Oral At Bedtime     sodium chloride (PF)  3 mL Intravenous Q8H     sodium chloride (PF)  3 mL Intracatheter Q8H

## 2022-07-01 NOTE — PROGRESS NOTES
Care Management Follow-up  Haleigh Moore would like to know if pt could [facility].  SW contacted Beth liasubhash Knight.  LTC is not able to take her back over the holiday weekend, but can on Monday 7/4.  Facility would need to be contacted, not liaison if discharge back on Monday.    [facility]illed [facility]   [facility]   [address] [place] [address]   Main Ph [phone]   Liaison Ph [phone]   Fax [phone]    SW will follow.    BROOKS Fuller, LSW  6B Intermediate Care Unit   [facility]  Office: [phone]  Pager: [phone]  Fax: [phone]    For weekend social work needs, contact information below and available on [facility]:  4A, 4C, 4E, 5A, 5B  pager [phone]  6A, 6B, 6C, 6D                       pager [phone]  7A, 7B, 7C, 7D, 5C  pager [phone]     For weekend RN care coordinator needs (home discharge with needs including home care, assisted living facility returns, durable medical equip, IV antibiotics) - page [phone].

## 2022-07-01 NOTE — DISCHARGE INSTRUCTIONS
"-Discontinue use of the Incruse and Breo as an inpatient as she does not have the necessary inspiratory flow to effectively use these inhalers.     -Begin Perforomist and Pulmicort nebs BID as inpatient.      -Will need follow up appointment with Pulmonologist and complete PFT's. Patient has not seen a pulmonologist for three years. Was a previous patient of Dr. Packer and also saw Dr. Funez for one follow up visit in the past. Unsure if patient has the ability to travel from nursing home to pulmonary clinic for visits.     -Smoking Cessation Counseling and Relapse Prevention Consult (Patient was down to 4 cigarettes/day when this writer saw her for smoking cessation counseling three years ago) Patient can not follow questions regarding whether she continues to smoke or not.     -Discharge with prescription for Perforomist and Pulmicort nebs twice a day as she is unable to use inhalers. Also give prescription for Duonebs prn for rescue medications.       -Patient needs nebulizer compressor at discharge with prescription for tubing, cups and mask. Per insurance requirements, Physician documentation in F2F notes needs to include dx diagnosis and need for nebulizer and medication frequency.     -Home Oxygen Assessment Testing 24-48 hours prior to discharge to determine O2 needs at rest and with exertion/activity. If the patient requires oxygen at rest, the walk test must be performed using the new baseline O2 to determine if patient needs more oxygen with activity.   -In the event patient qualifies for oxygen, at rest or with activity, please use the following verbiage in oxygen order: \"Please provide portable oxygen concentrator AND please test for oxygen conserving device using ____LPM to maintain sats between ____)  "

## 2022-07-01 NOTE — PLAN OF CARE
Neuro: A&Ox2. Disoriented to Time and situation.   Cardiac: SB- SR HR 50-60s VSS.   Respiratory: Sating >88% on 2 LPM NC  GI/: Pt is oliguric, on HD. BM x1 This shift.   Diet/appetite: Tolerating renal diet. Good appetite.   Activity:  Assist of 2 with walker and gait belt.   Pain: At acceptable level on current regimen. Denies pain.   Skin: No new deficits noted.   LDA's:    Plan: Continue with POC. Notify primary team with changes.   Goal Outcome Evaluation:    Plan of Care Reviewed With: patient

## 2022-07-01 NOTE — PROGRESS NOTES
GENERAL ID SERVICE CONSULTATION     Patient:  Kim Anne   Date of birth 1945, Medical record number [id number]  Date of Visit:  07/01/2022  Date of Admission: 6/27/2022  Consult Requester: Debra Greenberg MD          Assessment and Recommendations:   ASSESSMENT:  1. Strep mitis bacteremia, 1 of 2 sets, unclear source  - TTE without evidence of vegetations  - Poor dentition noted and CT with mandibular peridontitis, but Dental consulted without active concern  - R arm AV fistula without surface evidence of infection  - Repeat BCx NGTD    2.   ESRD, on HD  3.   CAD, diastolic heart failure, tricuspid valve regurgitation  4.   Dementia, sister is legal guardian    RECOMMENDATION:  -- Continue ceftriaxone; give 2g daily while in hospital, then at discharge 2g thrice weekly after dialysis   - Anticipate 14 days of therapy (6/27 to 7/10)   - Check CBC, CMP, and CRP once next week   - No need for ID follow up unless additional concerns arise    -- Appreciate dental evaluation - no current concerns  -- Recommend U/S of AV fistula to rule out abscess (less likely given lack of fevers, WBC, and benign clinical exam) -- pending  -- Patient without other signs/symptoms of endocarditis. TTE (which was re-read by cards looking for vegetations and of good quality) reassuring. All repeat BCx NGTD. I think we can defer a MADELAINE for now unless some other concern for endocarditis arises.      DISCUSSION:   75yo M with ESRD on hemodialysis through R arm AV fistula presents after hypotension during dialysis, respiratory decompensation, and found to have Strep mitis bacteremia. She is actually quite clinically stable and has been afebrile with normal WBC. I suspect this is a relatively low grade bacteremia. Differential for sources include teeth, AV fistula site (benign on exam), and aspiration (imaging more consistent with pulm edema). Endocarditis is considered but deemed less likely given acute presentation, negative  TTE, and single culture set so far positive. She has native valves. Dental CT obtained which showed an area of mandibular peridontitis but no emily abscess. Dental consulted and did not recommend any further interventions.    Strep mitis is pan-susceptible. Ceftriaxone would be the preferred agent given ease of dosing with dialysis. Anticipate 14 days of therapy, unless clinical pictures evolves. Overall rapid improvement and seeming low-grade bacteremia are reassuring. Okay to discharge from ID standpoint.      ID will sign off at this time. Please contact me if there are any additional questions or concerns.     Arnav Chery MD  Infectious Diseases  [phone]  ________________________________________________________________    INTERVAL HX: Fluctuating orientation. On 2L NC. Tolerating antibiotic without rash or itching. No diarrhea.         History of Present Illness:   History obtained primarily from chart review  Kim Anne is a 75 yo woman with past medical history of dementia (legal guardian Vandana, sister), ESRD 2/2 diabetic nephropathy on HD, CAD with HFpEF. Patient reports feeling normal state of health in past few days and leading up to dialysis today. Was dialyzing with goal for 3 L fluid removal, became hypotensive to systolics 60s/70s, asymptomatic without any reported SOB or lightheadedness. She was then sent to the ED.    In the hospital the patient has been noted to having respiratory failure requiring BiPAP. No fevers. No WBC. Normal lactate. However, blood cultures drawn on admission show 1 of 2 sets positive for Strep mitis. Started initially on vancomycin and zosyn. She has now been narrowed down to unasyn.    During my interview, she did not have any significant complaints. She denies tooth pain, pain at the fistula site, chest or back pain, or any swollen joints. No abdominal symptoms. She is not currently complaining about breathing. No fevers or chills.            Review of  Systems:   Complete 5pt ROS performed, see HPI for pertinent findings.         Current Medications:       acetylcysteine  4 mL Nebulization BID     aspirin  81 mg Oral At Bedtime     atorvastatin  40 mg Oral At Bedtime     budesonide  0.5 mg Nebulization BID     cefTRIAXone  2 g Intravenous Q24H     formoterol  20 mcg Nebulization Q12H     heparin ANTICOAGULANT  5,000 Units Subcutaneous Q12H     levothyroxine  175 mcg Oral QAM AC     melatonin  3 mg Oral At Bedtime     midodrine  10 mg Oral TID w/meals     pantoprazole  40 mg Oral BID     QUEtiapine  25 mg Oral At Bedtime     sodium chloride (PF)  3 mL Intravenous Q8H     sodium chloride (PF)  3 mL Intracatheter Q8H            Allergies:     Allergies   Allergen Reactions     Contrast Dye Hives and Itching     Clonidine      She had as IP and thinks it made her itchy     Diatrizoate Other (See Comments)     Diltiazem      Severe bradycardia     Iodine-131             Physical Exam:   Vitals were reviewed  Patient Vitals for the past 24 hrs:   BP Temp Temp src Pulse Resp SpO2 Weight   07/01/22 1615 (!) 117/32 98.6  F (37  C) Oral 55 16 98 % --   07/01/22 1159 -- -- -- -- -- (!) 89 % --   07/01/22 1110 (!) 123/31 -- -- 57 -- -- --   07/01/22 1109 (!) 140/42 97  F (36.1  C) Axillary 53 20 -- --   07/01/22 0743 122/62 97.5  F (36.4  C) Oral 58 16 96 % --   07/01/22 0440 -- -- -- -- -- -- 90.4 kg (199 lb 4.7 oz)   07/01/22 0400 -- -- -- -- -- 94 % --   07/01/22 0000 118/71 98.4  F (36.9  C) Oral 64 16 99 % --   06/30/22 2036 -- -- -- 57 -- 98 % --   06/30/22 1937 133/49 98.6  F (37  C) Oral 56 16 93 % --   06/30/22 1733 -- -- -- 54 -- 90 % --       Physical Examination:  GENERAL: Well-developed, well-nourished, in bed in no acute distress. Initially sleeping.  HEENT:  Head is normocephalic, atraumatic. On NC.   EYES:  Eyes have anicteric sclerae without conjunctival injection or stigmata of endocarditis.    ABDOMEN:  Normal bowel sounds, soft, nontender. Obese.  SKIN:   No acute rashes. No stigmata of endocarditis. AV fistula site on R arm without concern.  NEUROLOGIC:  Grossly nonfocal. Active x4 extremities.         Laboratory Data:     Inflammatory Markers    Recent Labs   Lab Test 11/23/19  1003 05/29/19  0452   SED 26  --    CRP 8.9* 38.0*       Hematology Studies    Recent Labs   Lab Test 07/01/22  0556 06/30/22  0537 06/29/22  0552 06/28/22  1932 06/28/22  0841 06/28/22  0454 06/27/22  1612 06/27/22  1527 10/20/21  0855 06/25/21  1203 06/03/21  0349 06/02/21  1308 02/28/20  0548 02/26/20  0454 02/25/20  1828 02/25/20  1507 02/25/20  0955 02/06/20  1151   WBC 6.1 6.6 4.7  --   --  5.5 5.0 5.1   < > 6.3   < > 7.7   < > 5.9  --  7.4 7.8 7.6   ANEU  --   --   --   --   --   --   --   --   --  5.2  --  7.2  --  3.6  --  4.2 4.9 5.6   AEOS  --   --   --   --   --   --   --   --   --  0.2  --  0.0  --  0.3  --  0.5 0.4 0.4   HGB 7.6* 7.6* 7.2* 7.2* 7.1* 6.9* 8.6* 8.5*   < > 6.4*   < > 7.5*   < > 8.3*   < > 9.0* 8.4* 10.1*   * 116* 113*  --   --  111* 108* 109*   < > 105*   < > 112*   < > 107*  --  106* 106* 103*    252 216  --   --  232 257 246   < > 355   < > 194   < > 352  --  376 346 305    < > = values in this interval not displayed.       Metabolic Studies     Recent Labs   Lab Test 07/01/22  0556 06/30/22  0822 06/30/22  0537 06/29/22  0552 06/28/22  0454   * 132* 132* 134* 132*   POTASSIUM 4.0 4.1 5.1 4.7 5.3   CHLORIDE 98 97* 97* 97* 96*   CO2 25 27 25 26 25   BUN 20.6 25.7* 31.1* 25.2* 32.1*   CR 2.38* 2.44* 2.93* 2.29* 2.81*   GFRESTIMATED 21* 20* 16* 21* 17*       Hepatic Studies    Recent Labs   Lab Test 06/27/22  1612 06/27/22  1128 10/20/21  0855 06/25/21  1203 06/03/21  0533 06/02/21  1308   BILITOTAL 0.5 0.4 0.7 0.4 0.4 0.3   ALKPHOS 312* 346* 276* 275* 228* 258*   ALBUMIN 3.6 3.0* 2.9* 2.7* 2.6* 2.9*   AST 37* 23 32 41 17 31   ALT 13 14 14 36 20 20       Microbiology:  Culture   Date Value Ref Range Status   06/30/2022 No growth after 12 hours   Preliminary   2022 No growth after 12 hours  Preliminary   2022 No growth after 2 days  Preliminary   2022 No growth after 2 days  Preliminary   2022 Positive on the 1st day of incubation (A)  Final   2022 Streptococcus mitis group (AA)  Final     Comment:     2 of 2 bottles     Urine Studies    Recent Labs   Lab Test 20  1152 19  1235 19  2258 18  1030 10/25/17  0640   LEUKEST Negative Negative Negative Negative Negative   WBCU 1 2 <1 1 3*       Vancomycin Levels    Recent Labs   Lab Test 19  0609 19  0238 19  1003   VANCOMYCIN 18.4 19.5 11.9       Hepatitis B Testing   Recent Labs   Lab Test 22  1205 21  1335 19  0855 19  0625 19  1647 10/25/17  0639   HBCAB  --   --   --   --   --  Nonreactive   HEPBANG Nonreactive Nonreactive   < >  --    < > Nonreactive   HBCM  --   --   --  Nonreactive  --   --     < > = values in this interval not displayed.     Hepatitis C Testing     Hepatitis C Antibody   Date Value Ref Range Status   2019 Nonreactive NR^Nonreactive Final     Comment:     Assay performance characteristics have not been established for newborns,   infants, and children     10/25/2017 Nonreactive NR^Nonreactive Final     Comment:     Assay performance characteristics have not been established for newborns,   infants, and children       Recent Results (from the past 4320 hour(s))   Echocardiogram Complete   Result Value    LVEF  55-60%    Narrative    [id number]  [id number]  [id number]  [id number]^FARHAT^SELINA^KAMLA     [facility],[place]  Echocardiography Laboratory  [address] [place] [id number]     Name: JONEL CHAVEZ  MRN: [id number]  : 1945  Study Date: 2022 09:43 AM  Age: 76 yrs  Gender: Female  Patient Location: [facility]  Reason For Study: Dyspnea  Ordering Physician: SELINA DOUGHERTY  Performed By: Stanislaw Lozada     BSA: 2.0 m2  Height: 65 in  Weight: 200  lb  ______________________________________________________________________________  Procedure  Echocardiogram with two-dimensional, color and spectral Doppler performed.  ______________________________________________________________________________  Interpretation Summary  Global and regional left ventricular function is normal with an EF of 55-60%.  Grade III or advanced diastolic dysfunction with relative wall thickness is  increased consistent with concentric remodeling.  The RV function is low-normal. The right ventricle is normal size.  Moderate to severe tricuspid insufficiency is present.  The PA systolic pressure is at least 84 mmHg.  No pericardial effusion is present.  IVC diameter >2.1 cm collapsing <50% with sniff suggests a high RA pressure  estimated at 15 mmHg or greater.  Compared to the study from 6/3/2021, the PA systolic pressure has increased  and the tricuspid regurgitation has increased modestly in severity. The RV  function is similar.  ______________________________________________________________________________  Left Ventricle  Global and regional left ventricular function is normal with an EF of 55-60%.  Left ventricular size is normal. Relative wall thickness is increased  consistent with concentric remodeling. Grade III or advanced diastolic  dysfunction. Diastolic Doppler findings (E/E' ratio and/or other parameters)  suggest left ventricular filling pressures are increased. No regional wall  motion abnormalities are seen.     Right Ventricle  The right ventricle is normal size. The RV function is low-normal.     Atria  The right atria appears normal. Mild left atrial enlargement is present.     Mitral Valve  Mild mitral annular calcification is present. Mild mitral insufficiency is  present.     Aortic Valve  The aortic valve is tricuspid. Mild aortic valve calcification is present. On  Doppler interrogation, there is no significant stenosis or regurgitation.     Tricuspid  Valve  Moderate to severe tricuspid insufficiency is present. RVSP estimated at  atleast 84mmHg, consistent with severe Pulmonary HTN.     Pulmonic Valve  Mild pulmonic insufficiency is present.     Vessels  Sinuses of Valsalva 3.6 cm. Ascending aorta 2.7 cm. IVC diameter >2.1 cm  collapsing <50% with sniff suggests a high RA pressure estimated at 15 mmHg or  greater.     Pericardium  No pericardial effusion is present.     Compared to Previous Study  This study was compared with the study from 6/3/2021 . The PA systolic  pressure has increased and the tricuspid regurgitation has increased modestly  in severity. The RV function is similar.  ______________________________________________________________________________  MMode/2D Measurements & Calculations  RVDd: 3.8 cm  IVSd: 1.2 cm  LVIDd: 4.0 cm  LVIDs: 2.0 cm  LVPWd: 1.2 cm  FS: 51.4 %  LV mass(C)d: 170.7 grams  LV mass(C)dI: 86.3 grams/m2  Ao root diam: 3.6 cm  asc Aorta Diam: 2.7 cm  LVOT diam: 1.7 cm  LVOT area: 2.3 cm2  LA Volume (BP): 77.6 ml     LA Volume Index (BP): 39.2 ml/m2  RWT: 0.61     Doppler Measurements & Calculations  MV E max alberto: 133.0 cm/sec  MV A max alberto: 52.6 cm/sec  MV E/A: 2.5  MV dec slope: 830.0 cm/sec2  TR max alberto: 372.0 cm/sec  TR max P.1 mmHg  E/E' av.8  Lateral E/e': 38.2  Medial E/e': 19.4     ______________________________________________________________________________  Report approved by: nErique Rodas 2022 01:21 PM

## 2022-07-02 NOTE — PLAN OF CARE
Neuro: Alert. Disoriented to time/ occasionally situation.    Cardiac: SB, HR 50s. VSS.   Respiratory: Sating > 94% on 1L NC- spot checking. Refused BiPAP overnight.   GI/: Oliguric, pt is on HD. No BM this shift.   Diet/appetite: Tolerating renal diet.   Activity:  Assist of 2, turn/repsotioning in bed.   Pain: At acceptable level on current regimen.   Skin: No new deficits noted.  LDA's: PIV x1, CVC for HD.     Plan: Continue with POC. Notify primary team with changes.

## 2022-07-02 NOTE — PROGRESS NOTES
[facility]    Progress Note - Medicine Service, TED TEAM 1       Date of Admission:  6/27/2022    Assessment & Plan        Kim Anne is a 76 year old female admitted on 6/27/2022. She has a history of dementia (legal dax Ross, sister), ESRD 2/2 diabetic nephropathy on HD, CAD, history of NSTEMI, HFpEF and is admitted for hypotension from dialysis and acute hypoxic and hypercapnic respiratory failure.     Updates:  - lower BP in AM prior to AM midodrine dose, asymptomatic  - BP improved after dialysis  - mag 3 g today  - lab holiday tomorrow   - no need to continue asking about BIPAP at night, okay for NC       # Acute hypoxic and hypercapnic respiratory failure-Improving  # History HFpEF  Etiology is likely to be secondary to CAP and/or volume overload in setting of HFpEF and ESRD. CAP/sepsis is more likely as blood culture showed gram positive cocci in pairs and chains, streptococcus. CT with evidence of mucus plugging/possible aspiration pneumonia. HFpEF is also possible as she presented with pulmonary edema and lower extremity edema. ECHO results show EF 55-60%, increased fluid with plethoric IVC without respiratory variation, left ventricular hypertrophy, increased RA pressure and increased tricuspid regurgitation. PE is very unlikely as the CT was negative for any evidence of PE.  - BiPAP encouraged to improve ventilation overnight as VBG showed respiratory acidosis -- pt continues to refuse, will continue NC O2   - COPD management team involved, appreciate recs   - Performist and Pulmicort BID  -  DuoNeb Q6H PRN  - O2 monitoring Q4  - Repeat VBG if clinical decompensation or worsening mental status  - Abxs as below     # Streptococcus Mitis bacteremia, unclear source   # sepsis 2/2  Aspiration pneumonia vs bacteremia  - Abx: Ceftriaxone (6/27 - 7/10)for two week course  - Blood culture from 6/27 returned positive for gram positive cocci in  pairs and chains, specifically strep mitis  - Repeat blood Culture from 6/28 NGTD  - Repeat blood culture from 6/30 NGTD  - Dental Consulted and she was cleared from a dental perspective, no abscess noted  - TTE re-read by cards -- w/o evidence of endocarditis  - f/u US of AV fistula   - ID consulted for assessment of streptococcus bacteremia  - MRSA nares: negative    # ESRD on HD M/W/F  EDW reported 81 kg, recent post run weights closer to 84 to 85 kg, presented here at 90.5 kg though with fluid removal in dialysis became hypotensive.   - Nephro consulted for HD  - Dialyzes MWF through RUE fistula  - Does make some urine     # Atrial flutter on telemetry  - Telemetry showed signs of possible atrial flutter, EKG obtained shortly after onset (<20 minutes) revealed normal sinus rhythm with 1 degree AV block and no evidence of a flutter/a fib.   - Monitor on telemetry for 24 hours  - 3g mag for mag 1.7     # Hx CAD  Low concern for ACS due to no chest pain throughout hospitalization. Improved shortness of breath with BiPAP and O2 supplementation.   - PTA atorvastatin  - PTA ASA     # Chronic macrocytic anemia  # reported history GI bleed?  Presented at hgb 8.5, consistent with chronic anemia. Prior B12 and folate levels wnl, likely has chronic disease component. Reported history of GI bleeding though not definitive, patient denies though history unreliable, will monitor while on heparin. Hgb has remained stable at 7.2. Iron studies returned suggestive of anemia of chronic disease.  - PTA PPI  - CBC f/u, transfuse if hgb<7  - Continue Epogen  - Hold iron with next dialysis run due to possible infection per nephrology     # COPD  - consulted respiratory therapy for COPD education and optimization of therapy, appreciate recs   - discontinue PTA incruse and PTA breo (does not have necessary inspiratory flow to effectively use these inhalers)  - start perforomist and pumicort nebs BID  - duonebx Q6H PRN for rescue   - will  need f/u w/ pulmonologist w/ PFT as outpatient after discharge     # Hypothyroidism  - T4 levels wnl, will continue PTA levothyroxine 175 mcg daily      # Dementia  Alternative decision maker is sister, Vandana. Lives with her at home.  Update Vandana daily with plan  Seroquel at bedtime to help with delirium  Delirium precautions in place        Diet: Renal Diet (dialysis)    DVT Prophylaxis: Heparin subcutaneous  Chaney Catheter: Not present  Fluids: none  Central Lines: None  Cardiac Monitoring: None  Code Status: Full Code      Disposition Plan    - pt can return to [facility]   - pt medically ready for discharge pending LTACH availability to accept patient (likely 7/4 or 7/5)       Expected Discharge Date: 07/04/2022                The patient's care was discussed with the Attending Physician, Dr. Greenberg .    Clinically Significant Risk Factors Present on Admission                     ______________________________________________________________________    Interval History   No events overnight    This morning, softer BP noted MAP 63, asymptomatic. BP improved after midodrine AM dose and dialysis. Pt says she is feeling alright this afternoon. Says dialysis went okay. Denies pain, shortness of breath, lightheadedness, dizziness    Data reviewed today: I reviewed all medications, new labs and imaging results over the last 24 hours. I personally reviewed       Physical Exam   Vital Signs: Temp: 97.7  F (36.5  C) Temp src: Oral BP: 130/64 Pulse: 55   Resp: 20 SpO2: 99 % O2 Device: Nasal cannula Oxygen Delivery: 2 LPM  Weight: 197 lbs 1.46 oz  Constitutional: Awake, sitting in bed  Eyes: No scleral icterus   ENT: atraumatic, without obvious abnormality  Respiratory: poor inspiratory effort and low air movement, mildly oarse breath sounds   Cardiovascular:  Bradycardia, regular rhythm and no murmur noted  GI: soft, non-distended, non-tender  Skin: LE abrasions, appear dry and healing well. Bruises on left forearm near  previous IV placement. Fistula appears dry without drainage, erythema, or warmth.  Musculoskeletal: Trace lower extremity edema.  Neurologic:  Alert     Data  Recent Labs   Lab 07/02/22  0516 07/01/22  0556 06/30/22  0822 06/30/22  0537 06/29/22  0552 06/28/22  0454 06/27/22  1612 06/27/22  1527 06/27/22  1128   WBC  --  6.1  --  6.6 4.7   < > 5.0   < > 4.6   HGB  --  7.6*  --  7.6* 7.2*   < > 8.6*   < > 7.7*   MCV  --  110*  --  116* 113*   < > 108*   < > 111*   PLT  --  224  --  252 216   < > 257   < > 248   * 133* 132* 132* 134*   < > 130*  --  132*   POTASSIUM 4.1 4.0 4.1 5.1 4.7   < > 4.9  --  4.0   CHLORIDE 97* 98 97* 97* 97*   < > 94*  --  96*   CO2 23 25 27 25 26   < > 26  --  29   BUN 25.1* 20.6 25.7* 31.1* 25.2*   < > 24.8*  --  21.8   CR 2.98* 2.38* 2.44* 2.93* 2.29*   < > 2.40*  --  2.22*   ANIONGAP 13 10 8 10 11   < > 10  --  7   FRANCES 8.2* 8.3* 8.4* 8.6* 8.3*   < > 8.7*  --  7.8*   * 102* 146* 125* 119*   < > 130*  --  124*   ALBUMIN  --   --   --   --   --   --  3.6  --  3.0*   PROTTOTAL  --   --   --   --   --   --  6.9  --  6.1*   BILITOTAL  --   --   --   --   --   --  0.5  --  0.4   ALKPHOS  --   --   --   --   --   --  312*  --  346*   ALT  --   --   --   --   --   --  13  --  14   AST  --   --   --   --   --   --  37*  --  23    < > = values in this interval not displayed.     No results found for this or any previous visit (from the past 24 hour(s)).  Medications     - MEDICATION INSTRUCTIONS -       - MEDICATION INSTRUCTIONS -       - MEDICATION INSTRUCTIONS -         acetylcysteine  4 mL Nebulization BID     aspirin  81 mg Oral At Bedtime     atorvastatin  40 mg Oral At Bedtime     budesonide  0.5 mg Nebulization BID     cefTRIAXone  2 g Intravenous Q24H     formoterol  20 mcg Nebulization Q12H     heparin ANTICOAGULANT  5,000 Units Subcutaneous Q12H     levothyroxine  175 mcg Oral QAM AC     melatonin  3 mg Oral At Bedtime     midodrine  10 mg Oral TID w/meals     pantoprazole  40  mg Oral BID     QUEtiapine  25 mg Oral At Bedtime     sodium chloride (PF)  3 mL Intravenous Q8H     sodium chloride (PF)  3 mL Intracatheter Q8H

## 2022-07-02 NOTE — PROGRESS NOTES
"Tried BiPAP on PT and took it off right away and stated she will not wear \"make the doctor wear this\" she stated. Pt on 2 nc  "

## 2022-07-02 NOTE — PLAN OF CARE
Neuro: A&O x 2-3, flat affect, slow to respond. BA in place  Cardiac: SB 1st deg AVB, HR 50s. BPs soft, 80s-100s/40s-50s, MDs aware  Respiratory: Sating > 94% on 1-2L NC, Bipap dc'd because has been refusing. Intermittent loose cough, unable to collect sputum sample  GI/: Oliguric, pt is on HD. No BM this shift.   Diet/appetite: Tolerating renal diet.   Activity:  Assist of 2, turn/repsotioning in bed.   Pain: Denies   Skin: No new deficits noted.  LDA's: L PIV x1, R fistula B/T+      Plan: At HD most of shift. Continue with POC. Notify primary team with changes.

## 2022-07-02 NOTE — PROGRESS NOTES
HEMODIALYSIS TREATMENT NOTE    Date: 7/2/2022  Time: 1:59 PM    Data:  Pre Wt:89.4kg     Desired Wt: 87.4 kg   Post Wt: 88.9kg   Weight change: 0.5  kg  Ultrafiltration - Post Run Net Total Removed (mL): 500 mL  Vascular Access Status: patent  Dialyzer Rinse: Light  Total Blood Volume Processed: 90.02 L Liters  Total Dialysis (Treatment) Time: 3.5 Hours    Lab:   no    Interventions:Assessment  Pt dialyzed today for 3.5hrs  Via RAVF. 450Qb throughout. 500mls of UF d/t hypotension. Pt remains slightly confused at baseline. Epo and Hectorol given. Pt slept through her tx. Seen by renal team on run. Hemostasis achieved in 7 minutes. Report to PCN post run.     Plan:    Per renal

## 2022-07-02 NOTE — PROGRESS NOTES
"Hemodialysis Progress Note    I personally reviewed the vital signs, medications, labs, and imaging.  Subjective: I examined the patient during dialysis.  Patient has increased in fluid intake yesterday, 1.5 L.  Blood pressure is somewhat lower today.     Objective:  BP (!) 85/47   Pulse 50   Temp 98  F (36.7  C) (Axillary)   Resp 8   Ht 1.651 m (5' 5\")   Wt 89.4 kg (197 lb 1.5 oz)   SpO2 100%   BMI 32.80 kg/m      Intake/Output Summary (Last 24 hours) at 6/30/2022 1412  Last data filed at 6/30/2022 1200  Gross per 24 hour   Intake 520 ml   Output 3000 ml   Net -2480 ml     My key history or physical exam findings:   GA: More sleepy this AM.  Heart: RRR  Lung: Clear  Abdomen: Soft with mild distended  MSK: Only trace pitting edema    Problem list &Plan  # IDH; resolved  # ESKD MWF but doing TTS in the hospital  # Volume overload  Due to hypotension, we were unable to remove as much fluid today. We are only remove 800 ml of fluid. Next dialysis is Monday.   Please limit fluid intake to 1.2 L per day  # Anemia secondary to CKD   # Iron deficiency anemia  Would continue Epogen. No iron due to bacteremia.  # S. mitits bacteremia  # Hypotension  Blood pressure is low again. Continue Midodrine.    Time spent 25 minutes on this date of encounter for chart review, history taking, physical exam, medical decision making and co-ordination of care on this date of encounter.   Steve Cid  Date of Service (when I saw the patient): June 30, 2022    "

## 2022-07-02 NOTE — PLAN OF CARE
"Goal Outcome Evaluation:    /49 (BP Location: Right leg)   Pulse 53   Temp 98  F (36.7  C) (Oral)   Resp 18   Ht 1.651 m (5' 5\")   Wt 90.4 kg (199 lb 4.7 oz)   SpO2 90%   BMI 33.16 kg/m          Neuro: A&Ox 3  Cardiac: SR. VSS.       Respiratory: Sating 88%> on 0.5-1  GI/: No urinal output pt is on HD through rt VAD.  BM X1  Diet/appetite: Tolerating renal diet. Eating well.  Activity:  Assist of 2, up to chair and in halls.  Pain: At acceptable level on current regimen.   Skin: No new deficits noted.  LDA's:one piv     Plan: Continue with POC. Notify primary team with changes  "

## 2022-07-03 NOTE — PLAN OF CARE
Goal Outcome Evaluation:      Temp: 97.4  F (36.3  C) Temp src: Oral BP: 92/45 Pulse: 57   Resp: 18 SpO2: 98 % O2 Device: Nasal cannula Oxygen Delivery: 2 LPM     Time: 1521-6028  Activity: x2 assist w/ lift   Pain: c/o back pain, given PRN tylenol x2.   Neuro: disoriented x3, oriented to self. bed alarm for safety   Cardiac: Bradycardic, soft BP's team aware. Denies chest pain  Respiratory: sating upper 90's on 2L NC. C/o SOB  Given PRN albuterol neb w/ reported relief. Dry cough.   GI/:  Denies nausea. Aneuric on HD. No BM this shift  Diet: renal diet  Lines: X1 PIV, AV fistula  Skin: skin check done. Bruising on forearms and hands. Scab on L and R shins. Blister L toe  Lab:  reviewed  New changes this shift: tried to get OOB overnight. Bed alarm activated.   Plan: continue w/ POC

## 2022-07-03 NOTE — PLAN OF CARE
Transfer  Transferred to: 7B Room 29  Via:bed  Reason for transfer:Pt no longer appropriate for 6B- improved patient condition  Family: Aware of transfer  Belongings: Packed and sent with pt  Chart: Delivered with pt to next unit  Medications: Meds sent to new unit with pt  Report given to: ROSA RN  Pt status:   Neuro: AOx3. Disoriented to time.   Cardiac: Sinus bradycardia with PACs. VSS.   Respiratory: Sating 99+% on 2L NC.  GI/: Pt on HD. No BM this shift.   Diet/appetite: Tolerating renal diet. Fair appetite.   Activity:  Assist of 2x.   Pain: Patient denied pain throughout this shift.   Skin: No new deficits noted.  LDA's:  -L PIV  -R AV fistula     Plan: Continue with POC. Notify primary team with changes.

## 2022-07-03 NOTE — PLAN OF CARE
Goal Outcome Evaluation:    Plan of Care Reviewed With: patient     Time: 07:00-15:30     Reason for admit: Hypotension  Vitals: Low BP and bradycardic  Activity: Activity as tolerated, AO2  Neuro: Alert but disoriented to time  Mood/Behavior: Calm and cooperative  Lines/Drains: L PIV, SL, with R AV fistula  Cardiac: Bradycardic  Resp: Lung sounds clear, infrequent cough noted. With O2 @ 2 Lpm.  Diet: Renal diet with fair appetite  GI/: No BM nor gas this shift. Not making urine  Skin: Skin is dry, with scattered bruise on both forearms, scab on both shins and pressure injury on toes.  Pain: No c/o pain this shift  Labs/Imaging: No labs today     New this shift: Refused heparin subcutaneous. Able to help turn in bed.      Plan:  Continue with POC

## 2022-07-03 NOTE — PROGRESS NOTES
2 RN skin check done by Rebecca GEORGES And Antonette BURRIS    Findings: L toe blister. L wrist, L shin and R shin scabbing. Bilateral hand and forearm scattered bruising. AV fistula on R upper arm. PIV L forearm. Blanchable reddened buttocks.   Repositioned patient, skin barrier cream applied to buttocks no other skin interventions needed at this time.

## 2022-07-03 NOTE — PROGRESS NOTES
[facility]    Progress Note - Medicine Service, TED TEAM 1       Date of Admission:  6/27/2022    Assessment & Plan        Kim Anne is a 76 year old female admitted on 6/27/2022. She has a history of dementia (legal dax Ross, sister), ESRD 2/2 diabetic nephropathy on HD, CAD, history of NSTEMI, HFpEF and is admitted for hypotension from dialysis and acute hypoxic and hypercapnic respiratory failure.     Updates:  - discontinued Seroquel before bedtime   - discontinued telemetry, no events reported for 24 hours  - check CMP, CRP, CBC tomorrow morning for monitoring  - no need to continue asking about BIPAP at night, okay for NC   [facility]    # Acute hypoxic and hypercapnic respiratory failure-Improving  # History HFpEF  Etiology is likely to be secondary to CAP and/or volume overload in setting of HFpEF and ESRD. CAP/sepsis is more likely as blood culture showed gram positive cocci in pairs and chains, streptococcus. CT with evidence of mucus plugging/possible aspiration pneumonia. HFpEF is also possible as she presented with pulmonary edema and lower extremity edema. ECHO results show EF 55-60%, increased fluid with plethoric IVC without respiratory variation, left ventricular hypertrophy, increased RA pressure and increased tricuspid regurgitation. PE is very unlikely as the CT was negative for any evidence of PE.  - BiPAP encouraged to improve ventilation overnight as VBG showed respiratory acidosis -- pt continues to refuse, will continue NC O2   - COPD management team involved, appreciate recs   - Performist and Pulmicort BID  -  DuoNeb Q6H PRN  - O2 monitoring Q4  - Repeat VBG if clinical decompensation or worsening mental status  - Abxs as below     # Streptococcus Mitis bacteremia, unclear source   # sepsis 2/2  Aspiration pneumonia vs bacteremia  - Abx: Ceftriaxone (6/27 - 7/10)for two week course  - Blood  culture from 6/27 returned positive for gram positive cocci in pairs and chains, specifically strep mitis  - Repeat blood Culture from 6/28 NGTD  - Repeat blood culture from 6/30 NGTD  - Dental Consulted and she was cleared from a dental perspective, no abscess noted  - TTE re-read by cards -- w/o evidence of endocarditis  - f/u US of AV fistula   - ID consulted for assessment of streptococcus bacteremia  - MRSA nares: negative    # ESRD on HD M/W/F  EDW reported 81 kg, recent post run weights closer to 84 to 85 kg, presented here at 90.5 kg though with fluid removal in dialysis became hypotensive.   - Nephro consulted for HD  - Dialyzes MWF through RUE fistula  - Does make some urine     # Atrial flutter on telemetry  - Telemetry showed signs of possible atrial flutter, EKG obtained shortly after onset (<20 minutes) revealed normal sinus rhythm with 1 degree AV block and no evidence of a flutter/a fib.   - Discontinued telemetry, no events reported within 24 hours  - check mag in AM, replace as needed.    # Hx CAD  Low concern for ACS due to no chest pain throughout hospitalization. Improved shortness of breath with BiPAP and O2 supplementation.   - PTA atorvastatin  - PTA ASA     # Chronic macrocytic anemia  # reported history GI bleed?  Presented at hgb 8.5, consistent with chronic anemia. Prior B12 and folate levels wnl, likely has chronic disease component. Reported history of GI bleeding though not definitive, patient denies though history unreliable, will monitor while on heparin. Hgb has remained stable at 7.2. Iron studies returned suggestive of anemia of chronic disease.  - PTA PPI  - CBC f/u, transfuse if hgb<7  - Continue Epogen  - Hold iron with next dialysis run due to possible infection per nephrology     # COPD  - consulted respiratory therapy for COPD education and optimization of therapy, appreciate recs   - discontinue PTA incruse and PTA breo (does not have necessary inspiratory flow to  effectively use these inhalers)  - start perforomist and pumicort nebs BID  - duonebx Q6H PRN for rescue   - will need f/u w/ pulmonologist w/ PFT as outpatient after discharge     # Hypothyroidism  - T4 levels wnl, will continue PTA levothyroxine 175 mcg daily      # Dementia  Alternative decision maker is sister, Vandana. Lives with her at home.  Update Vandana daily with plan  Seroquel discontinued prior to bedtime  Delirium precautions in place      Diet: Renal Diet (dialysis)    DVT Prophylaxis: Heparin subcutaneous  Chaney Catheter: Not present  Fluids: none  Central Lines: None  Cardiac Monitoring: None  Code Status: Full Code      Physician Attestation   I, Analisa Swift MD, was present with the medical/IJLLIAN student who participated in the service and in the documentation of the note.  I have verified the history and personally performed the physical exam and medical decision making.  I agree with the assessment and plan of care as documented in the note.     Shared notes. Findings are incorporated     Analisa Swift MD  Date of Service (when I saw the patient): 07/03/22    Disposition Plan    - [facility]   - pt medically ready for discharge pending [facility] availability to accept patient, plan to be accepted 7/5.       Expected Discharge Date: 07/04/2022        Discharge Comments: Medically stable and ready for discharge pending when she can be accepted back to [facility] (7/4 or 7/5)        The patient's care was discussed with the Attending Physician, Dr. Greenberg .    Clinically Significant Risk Factors Present on Admission                     ______________________________________________________________________    Interval History   No events overnight, transferred to Med/Surg Unit.     Kim was able to sleep throughout the night. She has no shortness of breath, chest pain, or abdominal pain. She states her cough has improved.     Data reviewed today: I reviewed all medications, new labs and  imaging results over the last 24 hours. I personally reviewed       Physical Exam   Vital Signs: Temp: (!) 96.4  F (35.8  C) Temp src: Oral BP: 98/44 Pulse: 56   Resp: 18 SpO2: 100 % O2 Device: Nasal cannula Oxygen Delivery: 2 LPM  Weight: 197 lbs 1.46 oz  Constitutional: Awake, sitting in bed  Eyes: No scleral icterus   ENT: atraumatic, without obvious abnormality  Respiratory: poor inspiratory effort and low air movement, mildly coarse breath sounds   Cardiovascular:  Bradycardia, regular rhythm and no murmur noted  GI: soft, non-distended, non-tender  Skin: LE abrasions, appear dry and healing well. Bruises on left forearm near previous IV placement. Fistula appears dry without drainage, erythema, or warmth.  Musculoskeletal: Trace lower extremity edema.    Neurologic:  Alert, oriented to person and time, not oriented to place     Data  Recent Labs   Lab 07/02/22  0516 07/01/22  0556 06/30/22  0822 06/30/22  0537 06/29/22  0552 06/28/22  0454 06/27/22  1612 06/27/22  1527 06/27/22  1128   WBC  --  6.1  --  6.6 4.7   < > 5.0   < > 4.6   HGB  --  7.6*  --  7.6* 7.2*   < > 8.6*   < > 7.7*   MCV  --  110*  --  116* 113*   < > 108*   < > 111*   PLT  --  224  --  252 216   < > 257   < > 248   * 133* 132* 132* 134*   < > 130*  --  132*   POTASSIUM 4.1 4.0 4.1 5.1 4.7   < > 4.9  --  4.0   CHLORIDE 97* 98 97* 97* 97*   < > 94*  --  96*   CO2 23 25 27 25 26   < > 26  --  29   BUN 25.1* 20.6 25.7* 31.1* 25.2*   < > 24.8*  --  21.8   CR 2.98* 2.38* 2.44* 2.93* 2.29*   < > 2.40*  --  2.22*   ANIONGAP 13 10 8 10 11   < > 10  --  7   FRANCES 8.2* 8.3* 8.4* 8.6* 8.3*   < > 8.7*  --  7.8*   * 102* 146* 125* 119*   < > 130*  --  124*   ALBUMIN  --   --   --   --   --   --  3.6  --  3.0*   PROTTOTAL  --   --   --   --   --   --  6.9  --  6.1*   BILITOTAL  --   --   --   --   --   --  0.5  --  0.4   ALKPHOS  --   --   --   --   --   --  312*  --  346*   ALT  --   --   --   --   --   --  13  --  14   AST  --   --   --   --    --   --  37*  --  23    < > = values in this interval not displayed.     No results found for this or any previous visit (from the past 24 hour(s)).  Medications     - MEDICATION INSTRUCTIONS -       - MEDICATION INSTRUCTIONS -       - MEDICATION INSTRUCTIONS -         acetylcysteine  4 mL Nebulization BID     aspirin  81 mg Oral At Bedtime     atorvastatin  40 mg Oral At Bedtime     budesonide  0.5 mg Nebulization BID     cefTRIAXone  2 g Intravenous Q24H     formoterol  20 mcg Nebulization Q12H     heparin ANTICOAGULANT  5,000 Units Subcutaneous Q12H     levothyroxine  175 mcg Oral QAM AC     melatonin  3 mg Oral At Bedtime     midodrine  10 mg Oral TID w/meals     pantoprazole  40 mg Oral BID     sodium chloride (PF)  3 mL Intravenous Q8H     sodium chloride (PF)  3 mL Intracatheter Q8H

## 2022-07-04 NOTE — PLAN OF CARE
"Status: calm, cooperative, flat affect  Vitals: /46 (BP Location: Left arm)   Pulse 67   Temp (!) 96.2  F (35.7  C) (Oral)   Resp 18   Ht 1.651 m (5' 5\")   Wt 89.4 kg (197 lb 1.5 oz)   SpO2 100%   BMI 32.80 kg/m    Neuros:  A/ O  x 4. Follows commands  IV: L PIV- TKO  Labs/Electrolytes: blood culture no growth  Resp/trach: 3L NC  Diet: renal diet, tolerating  Bowel status: constipation, no BM NOC shift  : hemodialysis pt   Skin: ecchymosis bilateral UE and LE  Pain: ABD, Bilateral LE, radiating to back  Activity: Lift dependent  Plan:continue POC  Updates this shift: diastolic BP remains around 40s, pt prefers to drink milk with medications but is also complaining of ABD discomfort, constipation.                          "

## 2022-07-04 NOTE — PROGRESS NOTES
HEMODIALYSIS TREATMENT NOTE    Date: 7/4/2022  Time: 10:52 AM    Data:  Pre Wt:  94 kg   Desired Wt: 93 kg   Post Wt: 93 kg (Estimated)  Weight change:  1 kg  Ultrafiltration - Post Run Net Total Removed (mL): 1000 mL  Vascular Access Status: Fistula  patent  Dialyzer Rinse: Clear  Total Blood Volume Processed: 70.86 L   Total Dialysis (Treatment) Time: 3.5   Dialysate Bath: K 3, Ca 2.25  Heparin: None    Lab:   No    Interventions:  ESRD Pt. Cannulated without lido Inj. with 15g needle X 2 @ . 1L of fluid net was pulled per order. Pt got prn tylenol for c/o back pain with a good effect. Hectorol/Aranesp administered per order. Ending BP was 110/62. Pt rinsed back post tx, needles were pulled, new dressings applied, and sites were held for about 10mins to help achieve hemostasis. Hand off report was given to NICOLE Katz.    Assessment:  -Calm & Cooperative  -VSS  -A & O X 4  -Remain on 2L of 02 via nasal canula per her request     Plan:    Per renal

## 2022-07-04 NOTE — PLAN OF CARE
Goal Outcome Evaluation:    Plan of Care Reviewed With: patient      Time: 07:00-15:30     Reason for admit: Hypotension  Vitals: Low BP and bradycardic  Activity: Activity as tolerated, AO2  Neuro: Alert but disoriented to time  Mood/Behavior: Calm and cooperative  Lines/Drains: L PIV, SL, with R AV fistula  Cardiac: Bradycardic  Resp: Lung sounds clear, infrequent cough noted. With O2 @ 2 Lpm via NC.  Diet: Renal diet with fair appetite  GI/: No BM this shift, PRN Senna given. Not making urine  Skin: Skin is dry, with scattered bruise on both forearms, scab on both shins and pressure injury on toes.  Pain: No c/o pain this shift  Labs/Imaging: Labs are unremarkable from previous     New this shift: Refused heparin subcutaneous. Went to dialysis this morning, removed 1L. Sister verbalized that pt. Have pacemaker. C/o pain on chest as if something is pressing on it, VS checked, TX is 48-49 bpm sometimes, providers made aware.     Plan:  Continue with POC

## 2022-07-04 NOTE — PROGRESS NOTES
[facility]    Progress Note - Medicine Service, TED TEAM 1       Date of Admission:  6/27/2022    Assessment & Plan        Kim Anne is a 76 year old female admitted on 6/27/2022. She has a history of dementia (legal dax Ross, sister), ESRD 2/2 diabetic nephropathy on HD, CAD, history of NSTEMI, HFpEF and is admitted for hypotension from dialysis and acute hypoxic and hypercapnic respiratory failure.     Updates:  - plan for discharge to long term care facility tomorrow 7/5  - dialysis today, -800 mL removed  - 1.2L fluid restriction added per renal       # Streptococcus Mitis bacteremia, unclear source   # sepsis 2/2  Aspiration pneumonia vs bacteremia  - Abx: Ceftriaxone (6/27 - 7/10)for two week course   - ceftriaxone; give 2g daily while in hospital, then at discharge 2g thrice weekly after dialysis  - Blood culture from 6/27 returned positive for gram positive cocci in pairs and chains, specifically strep mitis  - Repeat blood Culture from 6/28 NGTD  - Repeat blood culture from 6/30 NGTD  - Dental Consulted and she was cleared from a dental perspective, no abscess noted  - TTE re-read by cards -- w/o evidence of endocarditis  - f/u US of AV fistula   - ID consulted for assessment of streptococcus bacteremia  - MRSA nares: negative    # ESRD on HD M/W/F  EDW reported 81 kg, recent post run weights closer to 84 to 85 kg, presented here at 90.5 kg though with fluid removal in dialysis became hypotensive.   - Nephro consulted for HD  - Dialyzes MWF through RUE fistula  - Does make some urine  - 1.2L fluid restriction  - continue midodrine    # Acute hypoxic and hypercapnic respiratory failure-Improving  # History HFpEF  Etiology is likely to be secondary to CAP and/or volume overload in setting of HFpEF and ESRD. CAP/sepsis is more likely as blood culture showed gram positive cocci in pairs and chains, streptococcus. CT with evidence of mucus  plugging/possible aspiration pneumonia. HFpEF is also possible as she presented with pulmonary edema and lower extremity edema. ECHO results show EF 55-60%, increased fluid with plethoric IVC without respiratory variation, left ventricular hypertrophy, increased RA pressure and increased tricuspid regurgitation. PE is very unlikely as the CT was negative for any evidence of PE.  - BiPAP encouraged to improve ventilation overnight as VBG showed respiratory acidosis -- pt continues to refuse, will continue NC O2    - COPD management team involved, appreciate recs   - Performist and Pulmicort BID  -  DuoNeb Q6H PRN  - O2 monitoring Q4  - Repeat VBG if clinical decompensation or worsening mental status  - Abxs as below     # Atrial flutter on telemetry  - Telemetry showed signs of possible atrial flutter, EKG obtained shortly after onset (<20 minutes) revealed normal sinus rhythm with 1 degree AV block and no evidence of a flutter/a fib.   - Discontinued telemetry, no events reported within 24 hours  - check mag in AM, replace as needed.    # Hx CAD  Low concern for ACS due to no chest pain throughout hospitalization. Improved shortness of breath with BiPAP and O2 supplementation.   - PTA atorvastatin  - PTA ASA     # Chronic macrocytic anemia  # reported history GI bleed?  Presented at hgb 8.5, consistent with chronic anemia. Prior B12 and folate levels wnl, likely has chronic disease component. Reported history of GI bleeding though not definitive, patient denies though history unreliable, will monitor while on heparin. Hgb has remained stable at 7.2. Iron studies returned suggestive of anemia of chronic disease.  - PTA PPI  - CBC f/u, transfuse if hgb<7  - Continue Epogen  - Hold iron with next dialysis run due to possible infection per nephrology     # COPD  - consulted respiratory therapy for COPD education and optimization of therapy, appreciate recs   - discontinue PTA incruse and PTA breo (does not have  "necessary inspiratory flow to effectively use these inhalers)  - start perforomist and pumicort nebs BID  - duonebx Q6H PRN for rescue   - will need f/u w/ pulmonologist w/ PFT as outpatient after discharge     # Hypothyroidism  - T4 levels wnl, will continue PTA levothyroxine 175 mcg daily      # Dementia  Alternative decision maker is sister, Vandana. Lives with her at home.  Update Vandana daily with plan  Seroquel discontinued prior to bedtime  Delirium precautions in place      Diet: Renal Diet (dialysis)  Fluid restriction 1200 ML FLUID    DVT Prophylaxis: Heparin subcutaneous  Chaney Catheter: Not present  Fluids: none  Central Lines: None  Cardiac Monitoring: None  Code Status: Full Code      Disposition Plan    Return to long term care facility 7/5/22       Expected Discharge Date: 07/05/2022        Discharge Comments: Medically stable and ready for discharge pending when she can be accepted back to [facility] (7/4 or 7/5), Axel need home oxygen assessment- may need home oxygen.        The patient's care was discussed with the Attending Physician, Dr. Greenberg .    Clinically Significant Risk Factors Present on Admission                     ______________________________________________________________________    Interval History   JERRICA    Pt reports dialysis went \"excellent\" today. She says she feels normal and denies pain. No new concerns today. She is pleased to be likely going back to long term care facility tomorrow    Data reviewed today: I reviewed all medications, new labs and imaging results over the last 24 hours. I personally reviewed       Physical Exam   Vital Signs: Temp: (!) 95.7  F (35.4  C) Temp src: Oral BP: 101/44 Pulse: 50   Resp: 18 SpO2: 99 % O2 Device: Nasal cannula Oxygen Delivery: 2 LPM  Weight: 207 lbs 3.72 oz  Constitutional: Awake, sitting in bed  Eyes: No scleral icterus   ENT: atraumatic, without obvious abnormality  Respiratory: poor inspiratory effort and low air movement, mildly coarse " breath sounds   Cardiovascular:  Bradycardia, regular rhythm and no murmur noted  GI: soft, non-distended, non-tender  Skin: LE abrasions, appear dry and healing well. Bruises on left forearm near previous IV placement. Fistula appears dry without drainage, erythema, or warmth.  Extremities: trace lower extremity edema bilaterally  Neurologic:  Alert, oriented to person and time, not oriented to place     Data  Recent Labs   Lab 07/04/22  0740 07/02/22  0516 07/01/22  0556 06/30/22  0822 06/30/22  0537   WBC 5.3  --  6.1  --  6.6   HGB 7.6*  --  7.6*  --  7.6*   *  --  110*  --  116*     --  224  --  252   * 133* 133*   < > 132*   POTASSIUM 4.4 4.1 4.0   < > 5.1   CHLORIDE 98 97* 98   < > 97*   CO2 23 23 25   < > 25   BUN 21.1 25.1* 20.6   < > 31.1*   CR 3.07* 2.98* 2.38*   < > 2.93*   ANIONGAP 13 13 10   < > 10   FRANCES 8.7* 8.2* 8.3*   < > 8.6*   GLC 80 103* 102*   < > 125*   ALBUMIN 2.9*  --   --   --   --    PROTTOTAL 6.0*  --   --   --   --    BILITOTAL 0.3  --   --   --   --    ALKPHOS 228*  --   --   --   --    ALT 8*  --   --   --   --    AST 21  --   --   --   --     < > = values in this interval not displayed.     No results found for this or any previous visit (from the past 24 hour(s)).  Medications     - MEDICATION INSTRUCTIONS -       - MEDICATION INSTRUCTIONS -       - MEDICATION INSTRUCTIONS -         acetylcysteine  4 mL Nebulization BID     aspirin  81 mg Oral At Bedtime     atorvastatin  40 mg Oral At Bedtime     budesonide  0.5 mg Nebulization BID     cefTRIAXone  2 g Intravenous Q24H     formoterol  20 mcg Nebulization Q12H     heparin ANTICOAGULANT  5,000 Units Subcutaneous Q12H     levothyroxine  175 mcg Oral QAM AC     melatonin  3 mg Oral At Bedtime     midodrine  10 mg Oral TID w/meals     pantoprazole  40 mg Oral BID     polyethylene glycol  17 g Oral BID     sennosides  2 tablet Oral Once     sodium chloride (PF)  3 mL Intravenous Q8H     sodium chloride (PF)  3 mL  Intracatheter Q8H

## 2022-07-04 NOTE — PLAN OF CARE
Afebrile, VSS on 2L NC.  Disoriented to time.  Denies pain and nausea.  L PIV TKO.  R AV fistula.  Tolerating renal diet, appetite fair.  No BM this shift.  Pt. Is Anuric, on Hemodialysis.  Pt. Is scheduled for dialysis @ 8:00 tomorrow morning.  Continue plan of care.

## 2022-07-04 NOTE — PROGRESS NOTES
"Hemodialysis Progress Note    I personally reviewed the vital signs, medications, labs, and imaging.  Subjective: I examined the patient during dialysis.  Today her blood pressure is soft again and we are unable to pull much fluid.  She denies any chest pain short of breath or fever.    Objective:  /44 (BP Location: Left arm)   Pulse 50   Temp (!) 95.7  F (35.4  C) (Oral)   Resp 18   Ht 1.651 m (5' 5\")   Wt 94 kg (207 lb 3.7 oz)   SpO2 99%   BMI 34.49 kg/m      Intake/Output Summary (Last 24 hours) at 6/30/2022 1412  Last data filed at 6/30/2022 1200  Gross per 24 hour   Intake 520 ml   Output 3000 ml   Net -2480 ml     My key history or physical exam findings:   GA: Sleepy.   Heart: RRR  Lung: Clear  Abdomen: Soft with mild distended  MSK: Only trace pitting edema    Problem list &Plan  # IDH; resolved  # ESKD MWF but doing TTS in the hospital  # Volume overload  C continue to increase mucusurrently, her weight continues to increase because we can barely pull fluid from her. Please limit fluid intake to 1.2 L per day. We may have to use albumin prime next time.   # Anemia secondary to CKD   # Iron deficiency anemia  Would continue Epogen. No iron due to bacteremia.  # S. mitits bacteremia  # Hypotension  Blood pressure is low again. Continue Midodrine.    Time spent 25 minutes on this date of encounter for chart review, history taking, physical exam, medical decision making and co-ordination of care on this date of encounter.   Steve Cid  Date of Service (when I saw the patient): July 4th, 2022    "

## 2022-07-05 NOTE — PLAN OF CARE
Goal Outcome Evaluation:    Plan of Care Reviewed With: patient      Time: 07:00-15:30     Reason for admit: Hypotension  Vitals: Low BP and bradycardic  Activity: Activity as tolerated, AO2  Neuro: Alert but disoriented to time  Mood/Behavior: Calm and cooperative  Lines/Drains: L PIV, SL, with R AV fistula  Cardiac: Bradycardic  Resp: Lung sounds clear, infrequent cough noted. With O2 @ 2 Lpm via NC.  Diet: Renal diet with fair appetite  GI/: No BM this shift. Not making urine, on HD  Skin: Skin is dry, with scattered bruise on both forearms, healed wound on both shins and pressure injury on toes, healed  Pain: No c/o pain this shift  Labs/Imaging: No labs today     New this shift: Worked with therapy this morning, Home O2 assessment done. For discharge to LTC  today, going to be picked up by transport at 1600H. For new PIV placement, before discharge, as ordered.  Plan:  Continue with POC

## 2022-07-05 NOTE — PROGRESS NOTES
Care Management Discharge Note    Discharge Date: 07/05/2022     Discharge Disposition: Long Term Care    [facility]   [address] [place] [address]   Main Ph [phone]   Liaison Ph [phone]   Fax [phone]    Discharge Services: Outpatient Dialysis  [facility] MWF    Discharge DME:      Discharge Transportation:  Stretcher transport (due to need for oxygen that pt cannot manage) arranged via [employer] ([phone]) for today at 4pm.     Private pay costs discussed: transportation costs    PAS Confirmation Code:  Not needed for return to facility  Patient/family educated on Medicare website which has current facility and service quality ratings:  NA as pt admitted from a SNF and has a bed hold    Education Provided on the Discharge Plan:  Yes  Persons Notified of Discharge Plans: Pt's sister/guardian Vandana ([phone]), bedside nurse, charge nurse, NST, receiving facility, Dr. Jameson, [facility])  Patient/Family in Agreement with the Plan:  Pt's sister/guardian Vandana is in agreement    Handoff Referral Completed: Yes     Discharge orders were faxed at approximately 1:40pm.     Per liasubhash Knight and LPN Care Coordinator at pt's SNF, pt can discharge with a peripheral IV (that is not more than 3 days old) and does not need a PICC placed.     SW updated pt's medical team and bedside nurse Mariella Moore, ANA, [facility]     [phone] (pager) [phone]  7/5/2022

## 2022-07-05 NOTE — DISCHARGE SUMMARY
[facility]  Discharge Summary - Medicine & Pediatrics       Date of Admission:  6/27/2022  Date of Discharge:  7/5/2022  Discharging Provider: Nitza Jameson MD   Discharge Service: Medicine Service, TED TEAM 1    Discharge Diagnoses   Strep mitis bacteremia (unclear source, but could be oral)  Sepsis from aspiration pneumonia vs bacteremia  Acute hypoxic and hypercapnic respiratory failure due to aspiration pneumonia  Acute (on Chronic) Diastolic Heart Failure  Episode of A flutter  History of CAD  Chronic macrocytic anemia likely due to anemia of chronic disease  COPD  Hypothyroidism  Dementia    Follow-ups Needed After Discharge   Follow-up Appointments     Follow Up and recommended labs and tests      Follow up with primary care provider ir nursing home physician in 7 days   for post-hospitalization follow-up. No specific follow up labs or test are   needed at that time. Patient noted to be more hypoxic at night. Please   consider sleep study to evaluate for sleep apnea if pt agreeable.     Please also make a follow-up appointment with your regular pulmonologist   at your earliest convenience. You will need PFT testing. Referrals for   pulmonology follow-up and PFT testing have been placed.             Unresulted Labs Ordered in the Past 30 Days of this Admission     Date and Time Order Name Status Description    6/30/2022  2:05 PM Blood Culture Hand, Left Preliminary     6/30/2022  2:05 PM Blood Culture Hand, Left Preliminary       These results will be followed up by hospitalist    Discharge Disposition   Discharged to long-term care facility  Condition at discharge: Stable    Hospital Course   Kim Anne is a 76 year old female admitted on 6/27/2022. She has a history of dementia (legal guardian Vandana, sister), ESRD 2/2 diabetic nephropathy on HD, CAD, history of NSTEMI, HFpEF and is admitted for hypotension from dialysis and acute hypoxic and  hypercapnic respiratory failure.      Streptococcus Mitis bacteremia, unclear source   Sepsis 2/2 aspiration pneumonia vs bacteremia  Her blood culture from 6/27 was positive for strep mitis. This was treated with ceftriaxone 2 g daily while in the hospital and then at discharge 2g thrice weekly after dialysis, at her [facility] facility.Course: 6/27 - 7/10 for a two week course. Repeat blood cultures from 6/28 and 6/30 had NGTD. Her TTE had no evidence of endocarditis, and dental specialists were consulted with no abscess noted on exam in hospital.   US of AV fistula showed no abscess, but rather likely a hematoma related to HD access.     ESRD on HD M/W/F  EDW reported 81 kg, recent post run weights closer to 84 to 85 kg, presented here at 90.5 kg though with fluid removal in dialysis became hypotensive. Nephrology was consulted for HD. She dialyzes MWF through her RUE fistula and does make some urine. She continued her midodrine and is on a 1.2L fluid restriction.     Acute hypoxic and hypercapnic respiratory failure  Acute (on Chronic) Diastolic Heart Failure  Etiology is likely to be secondary to CAP with a component of volume overload in the setting of HFpEF and ESRD. CAP/sepsis is a larger component, as her blood culture grew strep mitis. Her CT had evidence of mucus plugging/possible aspiration pneumonia.  She also presented with pulmonary edema and lower extremity edema. ECHO results show EF 55-60%, increased fluid with plethoric IVC without respiratory variation, left ventricular hypertrophy, increased RA pressure and increased tricuspid regurgitation. PE is very unlikely as the CT was negative for PE. Performist and Pulmicort were given BID along with DuoNeb Q6H PRN. Fluid overload was treated with HD.     Atrial flutter on telemetry  When telemetry showed signs of possible atrial flutter, an EKG obtained shortly after onset (<20 minutes) revealed normal sinus rhythm with 1 degree AV block and no evidence of  "a flutter/a fib.      Hx CAD  Low concern for ACS due to no chest pain throughout hospitalization. Improved shortness of breath with BiPAP and O2 supplementation. Home ASA and atorvastatin were continued.     Chronic macrocytic anemia  reported history GI bleed?  Presented at hgb 8.5, consistent with chronic anemia. Prior B12 and folate levels wnl, likely has chronic disease component. Reported history of GI bleeding though not definitive, patient denies though history unreliable, will monitor while on heparin. Hgb has remained stable at 7.2. Iron studies returned suggestive of anemia of chronic disease. Home PPI was continued as well as Epogen regimen.      COPD  Respiratory therapy was consulted for COPD education and optimization of therapy. They discontinued home incruse and home breo (does not have necessary inspiratory flow to effectively use these inhalers) and started perforomist and pumicort nebs BID as well as duonebs Q6H PRN for rescue. She will need f/u w/ pulmonologist w/ PFT as outpatient after discharge.     Hypothyroidism  T4 levels wnl, continued PTA levothyroxine 175 mcg daily.      Dementia  Alternative decision maker is sister, Vandana, who lives with her at home. Seroquel was discontinued prior to bedtime, and delirium precautions are in place.     Cachexia: ruled out   Estimated body mass index is 33.2 kg/m  as calculated from the following:    Height as of 2/25/20: 1.651 m (5' 5\").    Weight as of this encounter: 90.5 kg (199 lb 8.3 oz).      Consultations This Hospital Stay   PHARMACY IP CONSULT  PHARMACY IP CONSULT  MEDICATION HISTORY IP PHARMACY CONSULT  PHYSICAL THERAPY ADULT IP CONSULT  OCCUPATIONAL THERAPY ADULT IP CONSULT  NEPHROLOGY ESRD ADULT IP CONSULT  PHARMACY TO DOSE VANCO  NURSING TO CONSULT FOR VASCULAR ACCESS CARE IP CONSULT  NURSING TO CONSULT FOR VASCULAR ACCESS CARE IP CONSULT  NURSING TO CONSULT FOR VASCULAR ACCESS CARE IP CONSULT  NURSING TO CONSULT FOR VASCULAR ACCESS " CARE IP CONSULT  PHARMACY IP CONSULT  PHARMACY TO DOSE VANCO  CARE MANAGEMENT / SOCIAL WORK IP CONSULT  IP RESPIRATORY CARE CHRONIC PULMONARY DISEASE SPECIALIST  INFECTIOUS DISEASE GENERAL ADULT IP CONSULT  DENTAL HYGIENE IP ADULT CONSULT - UU  NURSING TO CONSULT FOR VASCULAR ACCESS CARE IP CONSULT  NURSING TO CONSULT FOR VASCULAR ACCESS CARE IP CONSULT  VASCULAR ACCESS FOR PICC PLACEMENT ADULT IP CONSULT  NURSING TO CONSULT FOR VASCULAR ACCESS CARE IP CONSULT    Code Status   Full Code       Nitza Jameson MD  [facility]  [facility] [address] [address]  Phone: [phone]  ______________________________________________________________________    Physical Exam   Vital Signs: Temp: (!) 96.6  F (35.9  C) Temp src: Oral BP: 106/47 Pulse: 54   Resp: 20 SpO2: 100 % O2 Device: Nasal cannula Oxygen Delivery: 2 LPM  Weight: 209 lbs 6.99 oz  Constitutional: Awake, sitting in bed  Eyes: No scleral icterus   ENT: atraumatic, without obvious abnormality  Respiratory: poor inspiratory effort and low air movement, mildly coarse breath sounds   Cardiovascular:  Bradycardia, regular rhythm and no murmur noted  GI: soft, non-distended, non-tender  Skin: LE abrasions, appear dry and healing well. Bruises on left forearm near previous IV placement. Fistula dry without drainage, erythema, or warmth.  Extremities: trace lower extremity edema bilaterally  Neurologic:  Alert, oriented to person and time, not oriented to place       Primary Care Physician   Ailyn Nieves    Discharge Orders      Pulmonary Medicine Referral      Primary Care - Care Coordination Referral      General info for SNF    Length of Stay Estimate: Long Term Care  Condition at Discharge: Stable  Level of care:board and care  Rehabilitation Potential: Fair  Admission H&P remains valid and up-to-date: Yes  Recent Chemotherapy: N/A  Use Nursing Home Standing Orders: Yes     Mantoux instructions    Give  two-step Mantoux (PPD) Per Facility Policy Yes     Reason for your hospital stay    Dear Kim Anne,  You were hospitalized at [facility] with strep bacteremia (bacteria infection in your blood) and treated with antibiotics. You were evaluated and treated by infectious disease specialist, nephrologist, COPD team, and internal medicine. Over your hospitalization your condition improved and today you are ready to be discharged back to your long term care facility.  If you develop fever, worsening shortness of breath, light headedness, chest pain or other symptoms that concern you, please seek medical attention.     Activity - Up ad yohana     Follow Up and recommended labs and tests    Follow up with primary care provider ir nursing home physician in 7 days for post-hospitalization follow-up. No specific follow up labs or test are needed at that time. Patient noted to be more hypoxic at night. Please consider sleep study to evaluate for sleep apnea if pt agreeable.     Please also make a follow-up appointment with your regular pulmonologist at your earliest convenience. You will need PFT testing. Referrals for pulmonology follow-up and PFT testing have been placed.     IV access    PIV     General PFT Lab (Please always keep checked)     Pulmonary Function Test    .     Nebulizer and Nebulizer Supplies    Nebulizer/Supplies Documentation:   The patient was seen 07/05/2022. After assessment, the patient will need to be treated with ongoing nebulizer for treatment/management of COPD.    I, the undersigned, certify that the above prescribed supplies are medically necessary for this patient and is both reasonable and necessary in reference to accepted standards of medical and necessary in reference to accepted standards of medical practice in the treatment of this patient's condition and is not prescribed as a convenience.     Oxygen Adult/Peds    Oxygen Documentation:   I certify that  this patient, Kim Anne has been under my care (or a nurse practitioner or physican's assistant working with me). This is the face-to-face encounter for oxygen medical necessity.      Kim Anne is now in a chronic stable state and continues to require supplemental oxygen. Patient has continued oxygen desaturation due to Emphysema J43.9.    Alternative treatment(s) tried or considered and deemed clinically infective for treatment of Emphysema J43.9 include pulmonary toileting.  If portability is ordered, is the patient mobile within the home? yes    **Patients who qualify for home O2 coverage under the CMS guidelines require ABG tests or O2 sat readings obtained closest to, but no earlier than 2 days prior to the discharge, as evidence of the need for home oxygen therapy. Testing must be performed while patient is in the chronic stable state. See notes for O2 sats.**     Diet    Follow this diet upon discharge: Orders Placed This Encounter      Fluid restriction 1200 ML FLUID      Renal Diet (dialysis)       Significant Results and Procedures   Most Recent 3 CBC's:Recent Labs   Lab Test 07/04/22  0740 07/01/22  0556 06/30/22  0537   WBC 5.3 6.1 6.6   HGB 7.6* 7.6* 7.6*   * 110* 116*    224 252     Most Recent 3 BMP's:Recent Labs   Lab Test 07/04/22  0740 07/02/22  0516 07/01/22  0556   * 133* 133*   POTASSIUM 4.4 4.1 4.0   CHLORIDE 98 97* 98   CO2 23 23 25   BUN 21.1 25.1* 20.6   CR 3.07* 2.98* 2.38*   ANIONGAP 13 13 10   FRANCES 8.7* 8.2* 8.3*   GLC 80 103* 102*     Most Recent 2 LFT's:Recent Labs   Lab Test 07/04/22  0740 06/27/22  1612   AST 21 37*   ALT 8* 13   ALKPHOS 228* 312*   BILITOTAL 0.3 0.5     Most Recent Cholesterol Panel:Recent Labs   Lab Test 10/19/18  0939   CHOL 161   LDL 46   HDL 77   TRIG 188*     Most Recent 6 glucoses:Recent Labs   Lab Test 07/04/22  0740 07/02/22  0516 07/01/22  0556 06/30/22  0822 06/30/22  0537 06/29/22  0552   [facility] 80 103* 102* 146* 125*  119*     Most Recent ESR & CRP:Recent Labs   Lab Test 07/04/22  0740 11/23/19  1003   SED  --  26   CRP 14.60* 8.9*   ,   Results for orders placed or performed during the hospital encounter of 06/27/22   POC US ECHO LIMITED    Impression    Limited Bedside Cardiac Ultrasound, performed and interpreted by me.   Indication: Hypotension/shock.  Parasternal long axis, parasternal short axis, apical 4 chamber and subcostal views were acquired.   Image quality was satisfactory.    Findings:    Global left ventricular function appears hyperdynamic with LVH.  Chambers do not appear dilated.  There is no evidence of free fluid within the pericardium.    IMPRESSION: Hyperdynamic LV with LVH. Plethoric IVC w/o Respiratory variation.  No pericardial effusion.     XR Chest Port 1 View    Narrative    Exam: XR CHEST PORT 1 VIEW, 6/27/2022 11:51 AM    Indication: hypotension    Comparison: 10/10/2021    Findings:   Upper normal cardiac size. Unchanged metallic vascular stent over the  left superior mediastinum. Trace effusions. No pneumothorax.  Indistinct pulmonary vasculature and diffuse perihilar interstitial  opacities. No focal consolidation.      Impression    Impression:   1. Borderline cardiomegaly with interstitial pulmonary edema.  2. Trace effusions.    I have personally reviewed the examination and initial interpretation  and I agree with the findings.    OLI QURESHI MD         SYSTEM ID:  [id number]   CT Head w/o Contrast    Narrative    CT HEAD W/O CONTRAST 6/27/2022 12:41 PM    Provided History: ams    Comparison: Outside CT head report 10/5/2019    Technique: Using multidetector thin collimation helical acquisition  technique, axial, coronal and sagittal CT images from the skull base  to the vertex were obtained without intravenous contrast.     Findings:    No intracranial hemorrhage, mass effect, or midline shift. The  ventricles are proportionate to the cerebral sulci. Scattered patchy  foci of  hypodensity in the periventricular and supraventricular white  matter, which is nonspecific, likely related to chronic small vessel  ischemic disease given the patient's age. Mild general parenchymal  volume loss. Left temporoparietal encephalomalacia. Right parietal and  left cerebellar encephalomalacia, these findings are not described on  comparison report 10/5/2019. No acute loss of gray-white matter  differentiation. Atherosclerotic calcification of the carotid siphons  and vertebral arteries.    No acute osseous abnormality. Mild paranasal sinus mucosal thickening.  Mastoid air cells are clear. Bilateral pseudophakia       Impression    Impression:   1.  No definite acute intracranial abnormality.   2.  Moderate leukoaraiosis with additional findings of prior  infarctions affecting the left temporoparietal region, right parietal  region, and left cerebellum.    I have personally reviewed the examination and initial interpretation  and I agree with the findings.    FLORES DO MD         SYSTEM ID:  [id number]   CT Chest Pulmonary Embolism w Contrast    Narrative    EXAMINATION: CTA pulmonary angiogram, 6/28/2022 2:03 AM     COMPARISON: Chest x-ray 6/27/2022, CT chest abdomen pelvis 11/23/2019    HISTORY: ESRD on hemodialysis with hypotension and hypoxia.    TECHNIQUE: Volumetric helical acquisition of CT images of the chest  from the lung apices to the kidneys were acquired after the  administration of 80 mL of Isovue-370 IV contrast. Post-processed  multiplanar and/or MIP reformations were obtained, archived to PACS  and used in interpretation of this study. Two boluses of contrast were  utilized due to infiltration on the first scanning attempt.    FINDINGS:      Contrast bolus is: excellent.  Exam is negative for acute pulmonary  embolism. Enlarged main pulmonary artery up to 3.3 cm.    Remaining chest: Cardiomegaly. No significant pericardial effusion.  Moderate coronary artery calcifications. Normal  caliber thoracic aorta  with atherosclerosis. Occluded left subclavian artery stent, as seen  on 5/25/2019. Scattered mediastinal and hilar lymphadenopathy, likely  reactive. For example, 10 mm right hilar node (series 7, image 47 and  a 13 mm left hilar node (series 7, image 41); scattered 10 to 11 mm  right paratracheal nodes (series 7, image 21, 27); 11 mm periaortic  node (series 9, image 74). Patulous esophagus with mild esophageal  wall thickening.     Scattered posterior debris in the distal trachea and left mainstem  bronchus. Bronchial wall thickening. Diffuse smooth interlobular  septal thickening. Scattered bronchocentric groundglass opacities and  few nodular opacities. Expiration phase imaging. Mucous plugging in  the lower lobes. Bronchovascular thickening.    No pneumothorax. Small bilateral pleural effusions with associated  atelectatic changes.    Upper abdomen: Ascites. Atrophic pancreas. Aortic atherosclerosis.    Bones/Soft Tissues: Degenerative changes in the visualized spine. No  acute osseous abnormalities or suspicious bony lesions. Chronic  appearing right posterior rib fracture deformities. Anasarca.      Impression    IMPRESSION:   1. No acute pulmonary embolism.  2. Cardiomegaly with pulmonary edema and small bilateral pleural  effusions.  3. Scattered nodular opacities, suspicious for infection, although  there is lower lobe mucous plugging and debris in the central airway  suggesting some degree of likely aspiration.  4. Occluded left subclavian artery stent.  5. Ascites and anasarca.  6. Mediastinal and hilar lymphadenopathy. Recommend attention on  follow-up.      In the event of a positive result for acute pulmonary embolism  resulting in right heart strain, consider calling the   [facility] hospital  for PERT (Pulmonary Embolism Response Team)  Activation?    PERT -- Pulmonary Embolism Response Team (Multidisciplinary team  including cardiology, interventional radiology, critical  care,  hematology)    I have personally reviewed the examination and initial interpretation  and I agree with the findings.    CALE PACK MD         SYSTEM ID:  [id number]   XR Chest Port 1 View    Narrative    EXAM: XR CHEST PORT 1 VIEW  6/27/2022 6:41 PM     HISTORY:  new worsening hypoxia/SOB       COMPARISON:  Radiograph earlier same day.    TECHNIQUE: AP view the chest at 60 degrees    FINDINGS:     Vascular stent projecting over the left superior mediastinum. The  trachea is midline. The cardiomediastinal silhouette is stable. The  pulmonary vasculature is indistinct.  No appreciable pneumothorax or  focal consolidative opacity. Possible trace bilateral pleural  effusions. No acute osseous abnormality.        Impression    IMPRESSION:   1. Stable findings suggestive of pulmonary interstitial edema with  possible trace bilateral pleural effusions.  2. No new focal airspace opacity.    I have personally reviewed the examination and initial interpretation  and I agree with the findings.    RADHA LOYD DO         SYSTEM ID:  [id number]   CT Dental wo Contrast    Narrative    CT DENTAL WO CONTRAST 6/29/2022 4:14 PM    History:  To assess for dental infection    Comparison:  Head CT 6/27/2022      TECHNIQUE: 3-D reconstruction by the technologists, with curved  multiplanar reformat of thin section imaging through the mandible and  maxilla obtained without intravenous contrast.    FINDINGS:  Exam mildly distorted by motion artifact. Maxillary edentulism. Bony  defect in hard palate along the floor of the nasal cavity.    Multiple mandibular teeth arm is seen. There is a mild periapical  lucency at the left mandibular first premolar and right mandibular  canine. No significant soft tissue swelling or mass. Normal facial  bone alignment. No bony erosion.     Mild scattered mucosal thickening in the paranasal sinuses and  moderate mucosal thickening in the right sphenoid locule. The mastoid  air cells are clear.  Mild degenerative changes in the  temporomandibular joints right greater than left.      Impression    IMPRESSION: Small periapical lucency associated with the left  mandibular first premolar and right mandibular canine, suggestive of  periodontitis.     I have personally reviewed the examination and initial interpretation  and I agree with the findings.    FLORES DO MD         SYSTEM ID:  [id number]   US Ext Arterial Venous Dialys Acs Graft    Narrative    Exam: Duplex ultrasound of the right extremity dialysis fistula dated  6/30/2022 2:41 PM    Comparison examination: 12/13/19    Clinical history: Swelling evaluate for abscess    Ordering provider: Dr Greenberg    Technique: B-mode (grayscale) and duplex Doppler ultrasound of the  fistula including the arterial inflow through the venous outflow,  including any incidental findings. Velocity measurements obtained with  angle of insonation at or below 60 degrees. Flow volumes obtained in a  segment of the venous outflow.    Findings:    Right upper brachial to cephalic fistula extremity    Brachial artery upper arm: 191 cm/sec  Brachial mid artery: 225 cm/sec  Brachial artery Antecubital fossa: 289 cm/s  Brachial artery below anastomosis: 129 cm/sec, antegrade    Radial artery proximal forearm: 86 cm/sec  Ulnar artery origin: 87 cm/s     Fistula anastomosis: 314 cm/sec, 3.4 mm diameter     Diameter of the fistula anastomosis: 3.4 mm    Venous evaluation:    Cephalic vein antecubital fossa out flow: 544 cm/sec   Cephalic vein distal upper arm: 115 cm/sec, 21 mm diameter   Cephalic vein midarm: 100 cm/sec, 8.3 mm diameter  Cephalic vein upper arm: 168 cm/sec  Cephalic vein shoulder: 58 cm/sec  Cephalic vein near confluence: 101 cm/sec, 5.2 mm diameter    Flow volume measurement obtained at the cephalic outflow, is 1576  mL/min.    Subclavian vein: 57 cm/sec  Innominate vein: 104 cm/sec    Adjacent to the fistula access site there is a mixed echogenicity 4 x  19 x 24  mm fluid collection.    Waveforms: There are low resistance arterial waveforms leading up to  the brachial to cephalic fistula anastomosis with high resistance  arterial waveforms in the ulnar and radial artery distally with  antegrade flow. Venous waveforms are arterialized at the outflow and  antecubital fossa return to normal venous waveform by the mid upper  arm.      Impression    Impression:    1. Arterial inflow: Arterial inflow is normal    2. Fistula anastomosis evaluation: Velocities are mildly elevated at  the fistula which apparently related to mildly elevated velocities  within the brachial artery more proximally. These velocities are  reduced since the prior exam. No definite stenosis    3. Venous outflow: Venous outflow measures up to 1576 mL/m.    4. Small fluid collection adjacent to the fistula following a  crescentic shape is likely hematoma related to dialysis access. No  evidence of abscess as questioned   Echocardiogram Complete     Value    LVEF  55-60%    [facility]    [id number]  [id number]  [id number]  [id number]^FARHAT^SELINA^KAMLA     [facility]  Echocardiography Laboratory  [address] [place] [id number]     Name: JONEL CHAVEZ  MRN: [id number]  : 1945  Study Date: 2022 09:43 AM  Age: 76 yrs  Gender: Female  Patient [facility]: [facility]  Reason For Study: Dyspnea  Ordering Physician: SELINA DOUGHERTY  Performed By: Stanislaw Lozada     BSA: 2.0 m2  Height: 65 in  Weight: 200 lb  ______________________________________________________________________________  Procedure  Echocardiogram with two-dimensional, color and spectral Doppler performed.  ______________________________________________________________________________  Interpretation Summary  Global and regional left ventricular function is normal with an EF of 55-60%.  Grade III or advanced diastolic dysfunction with relative wall thickness is  increased consistent with concentric  remodeling.  The RV function is low-normal. The right ventricle is normal size.  Moderate to severe tricuspid insufficiency is present.  The PA systolic pressure is at least 84 mmHg.  No pericardial effusion is present.  IVC diameter >2.1 cm collapsing <50% with sniff suggests a high RA pressure  estimated at 15 mmHg or greater.  Compared to the study from 6/3/2021, the PA systolic pressure has increased  and the tricuspid regurgitation has increased modestly in severity. The RV  function is similar.  ______________________________________________________________________________  Left Ventricle  Global and regional left ventricular function is normal with an EF of 55-60%.  Left ventricular size is normal. Relative wall thickness is increased  consistent with concentric remodeling. Grade III or advanced diastolic  dysfunction. Diastolic Doppler findings (E/E' ratio and/or other parameters)  suggest left ventricular filling pressures are increased. No regional wall  motion abnormalities are seen.     Right Ventricle  The right ventricle is normal size. The RV function is low-normal.     Atria  The right atria appears normal. Mild left atrial enlargement is present.     Mitral Valve  Mild mitral annular calcification is present. Mild mitral insufficiency is  present.     Aortic Valve  The aortic valve is tricuspid. Mild aortic valve calcification is present. On  Doppler interrogation, there is no significant stenosis or regurgitation.     Tricuspid Valve  Moderate to severe tricuspid insufficiency is present. RVSP estimated at  atleast 84mmHg, consistent with severe Pulmonary HTN.     Pulmonic Valve  Mild pulmonic insufficiency is present.     Vessels  Sinuses of Valsalva 3.6 cm. Ascending aorta 2.7 cm. IVC diameter >2.1 cm  collapsing <50% with sniff suggests a high RA pressure estimated at 15 mmHg or  greater.     Pericardium  No pericardial effusion is present.     Compared to Previous Study  This study was compared  with the study from 6/3/2021 . The PA systolic  pressure has increased and the tricuspid regurgitation has increased modestly  in severity. The RV function is similar.  ______________________________________________________________________________  MMode/2D Measurements & Calculations  RVDd: 3.8 cm  IVSd: 1.2 cm  LVIDd: 4.0 cm  LVIDs: 2.0 cm  LVPWd: 1.2 cm  FS: 51.4 %  LV mass(C)d: 170.7 grams  LV mass(C)dI: 86.3 grams/m2  Ao root diam: 3.6 cm  asc Aorta Diam: 2.7 cm  LVOT diam: 1.7 cm  LVOT area: 2.3 cm2  LA Volume (BP): 77.6 ml     LA Volume Index (BP): 39.2 ml/m2  RWT: 0.61     Doppler Measurements & Calculations  MV E max alberto: 133.0 cm/sec  MV A max alberto: 52.6 cm/sec  MV E/A: 2.5  MV dec slope: 830.0 cm/sec2  TR max alberto: 372.0 cm/sec  TR max P.1 mmHg  E/E' av.8  Lateral E/e': 38.2  Medial E/e': 19.4     ______________________________________________________________________________  Report approved by: Enrique Rodas 2022 01:21 PM           *Note: Due to a large number of results and/or encounters for the requested time period, some results have not been displayed. A complete set of results can be found in Results Review.       Discharge Medications   Current Discharge Medication List      START taking these medications    Details   acetylcysteine (MUCOMYST) 10 % nebulizer solution Inhale 4 mLs into the lungs 2 times daily    Associated Diagnoses: Chronic bronchitis, unspecified chronic bronchitis type (H)      budesonide (PULMICORT) 0.5 MG/2ML neb solution Take 2 mLs (0.5 mg) by nebulization 2 times daily    Associated Diagnoses: Chronic bronchitis, unspecified chronic bronchitis type (H)      cefTRIAXone (ROCEPHIN) 2 GM vial Inject 2 g into the vein three times a week for 2 doses Three times weekly ( and ) at Long Term Care Facility after dialysis.  Qty: 40 mL, Refills: 0    Associated Diagnoses: Gram-positive bacteremia      formoterol (PERFOROMIST) 20 MCG/2ML neb solution Take 2 mLs  (20 mcg) by nebulization every 12 hours    Associated Diagnoses: Chronic bronchitis, unspecified chronic bronchitis type (H)         CONTINUE these medications which have CHANGED    Details   midodrine (PROAMATINE) 10 MG tablet Take 1 tablet (10 mg) by mouth 3 times daily (with meals)  Refills: 0    Associated Diagnoses: Hemodialysis-associated hypotension         CONTINUE these medications which have NOT CHANGED    Details   !! acetaminophen (TYLENOL) 500 MG tablet Take 1,000 mg by mouth every evening      !! acetaminophen (TYLENOL) 500 MG tablet Take 1,000 mg by mouth 2 times daily as needed for mild pain      ammonium lactate (LAC-HYDRIN) 12 % external lotion Apply topically 2 times daily  Qty: 225 g, Refills: 0    Associated Diagnoses: Dry skin      ASPIR-LOW 81 MG EC tablet Take 1 tablet (81 mg) by mouth At Bedtime  Qty: 90 tablet, Refills: 0    Associated Diagnoses: History of MI (myocardial infarction); Essential hypertension, benign      atorvastatin (LIPITOR) 40 MG tablet Take 1 tablet (40 mg) by mouth At Bedtime  Qty: 90 tablet, Refills: 0    Associated Diagnoses: Hyperlipidemia LDL goal <100      blood glucose monitoring (FREESTYLE LITE) test strip TEST BLOOD SUGAR TWO TIMES A DAY  Qty: 50 strip, Refills: 12    Associated Diagnoses: Type 2 diabetes mellitus without complication, with long-term current use of insulin (H)      blood glucose monitoring (FREESTYLE) lancets Test BS two times daily as directed  Qty: 100 each, Refills: 1    Associated Diagnoses: Type 2 diabetes mellitus without complication, with long-term current use of insulin (H)      Emollient (EUCERIN CALMING DAILY MOIST) CREA Externally apply 1 dose. topically daily  Qty: 1 Tube, Refills: 12    Associated Diagnoses: Type II or unspecified type diabetes mellitus with neurological manifestations, not stated as uncontrolled(250.60) (H)      gabapentin (NEURONTIN) 100 MG capsule Take 100 mg by mouth daily      ipratropium - albuterol 0.5  mg/2.5 mg/3 mL (DUONEB) 0.5-2.5 (3) MG/3ML neb solution Take 1 vial by nebulization every 6 hours as needed for shortness of breath / dyspnea or wheezing      levothyroxine (SYNTHROID/LEVOTHROID) 200 MCG tablet Take 200 mcg by mouth daily      LORazepam (ATIVAN) 0.5 MG tablet Take 0.5 mg by mouth three times a week Mon/Wed/Fri prior to HD sessions      multivitamin RENAL (NEPHROCAPS/TRIPHROCAPS) 1 MG capsule Take 1 capsule by mouth daily  Qty: 90 capsule, Refills: 0    Associated Diagnoses: CKD (chronic kidney disease) stage 4, GFR 15-29 ml/min (H)      nitroGLYcerin (NITROSTAT) 0.4 MG sublingual tablet Place 1 tablet (0.4 mg) under the tongue every 5 minutes as needed for chest pain  Qty: 25 tablet, Refills: 0    Associated Diagnoses: History of MI (myocardial infarction)      !! order for DME Equipment being ordered: Nebulizer  Qty: 1 Device, Refills: 0    Associated Diagnoses: Chronic obstructive pulmonary disease, unspecified COPD type (H)      !! order for DME Equipment being ordered: Boost.  Qty: 6 Can, Refills: 3    Associated Diagnoses: CKD (chronic kidney disease) stage 5, GFR less than 15 ml/min (H)      !! order for DME Equipment being ordered: cane  Qty: 1 each, Refills: 0    Associated Diagnoses: Non-compliant patient      !! order for DME Equipment being ordered: Diabetic Shoes  Qty: 1 each, Refills: 0    Associated Diagnoses: Type 2 diabetes mellitus with stage 5 chronic kidney disease not on chronic dialysis, without long-term current use of insulin (H)      !! order for DME Equipment being ordered: two pairs moderate knee high support hose  Qty: 2 Device, Refills: 1    Associated Diagnoses: Edema, unspecified type      !! order for DME Equipment being ordered: Compression socks.  Strength:15-20 mmHg  Qty: 2 each, Refills: 0    Associated Diagnoses: Localized edema      !! order for DME One wheeled walker with seat and brakes and basket  Qty: 1 Device, Refills: 0    Associated Diagnoses: Risk for  falls      pantoprazole (PROTONIX) 40 MG EC tablet Take 1 tablet (40 mg) by mouth 2 times daily  Qty: 60 tablet, Refills: 3    Associated Diagnoses: Encounter regarding vascular access for dialysis for ESRD (H)      polyethylene glycol (MIRALAX) packet Take 17 g by mouth daily as needed for constipation  Qty: 10 packet, Refills: 0    Associated Diagnoses: Slow transit constipation      senna-docusate (SENOKOT-S/PERICOLACE) 8.6-50 MG tablet Take 2 tablets by mouth At Bedtime      vitamin B complex with vitamin C (STRESS TAB) tablet Take 1 tablet by mouth daily (with dinner)      vitamin D3 (CHOLECALCIFEROL) 2000 units (50 mcg) tablet Take 1 tablet (2,000 Units) by mouth daily  Qty: 90 tablet, Refills: 3    Associated Diagnoses: Vitamin D deficiency disease       !! - Potential duplicate medications found. Please discuss with provider.      STOP taking these medications       albuterol (PROAIR HFA/PROVENTIL HFA/VENTOLIN HFA) 108 (90 Base) MCG/ACT inhaler Comments:   Reason for Stopping:         clopidogrel (PLAVIX) 75 MG tablet Comments:   Reason for Stopping:         fluticasone-salmeterol (ADVAIR) 250-50 MCG/DOSE inhaler Comments:   Reason for Stopping:         tiotropium (SPIRIVA) 18 MCG inhaled capsule Comments:   Reason for Stopping:             Allergies   Allergies   Allergen Reactions     Contrast Dye Hives and Itching     Clonidine      She had as IP and thinks it made her itchy     Diatrizoate Other (See Comments)     Diltiazem      Severe bradycardia     Iodine-131

## 2022-07-05 NOTE — PROGRESS NOTES
Time: 1261-3028    Activity: up with 2  Diet: Renal, 1200mL fluid restriction.  Pain: C/o lvl 8 back pain around 0000, given tylenol.  Neuro: Alert, oriented x4. Anxious at times. Denies numbness and tingling.   Cardiac: Bradycardic.  Respiratory: Lung sounds diminished, shortness of breath on exertion, Nasal cannula 2 LPM.   Skin: ZAIDA  GI/: Small bowel movement this shift. Voiding spontaneously.   Lines/inf: L PIV saline locked. R AV fistula.

## 2022-07-05 NOTE — PROGRESS NOTES
Patient has been assessed for Home Oxygen needs. Oxygen readings:    *Pulse oximetry (SpO2) = 100% on room air at rest while awake.    *SpO2 improved to 100% on 2liters/minute at rest.    *SpO2 = 87% on room air during activity/with exercise.    *SpO2 improved to 94% on 2liters/minute during activity/with exercise.

## 2022-07-05 NOTE — DISCHARGE SUMMARY
Pt. discharged at 4:15pm to [facility]  , and left with personal belongings. Pt. received complete discharge paperwork and  medications as filled by discharge pharmacy. Pt. was given times of last dose for all discharge medications in writing on discharge medication sheets. Discharge teaching included medication, pain management, activity restrictions and signs and symptoms of infection.  Pt. had no further questions at the time of discharge and no unmet needs were identified.

## 2022-07-05 NOTE — PROGRESS NOTES
"Blood pressure 101/44, pulse 50, temperature 97.1  F (36.2  C), resp. rate 18, height 1.651 m (5' 5\"), weight 94 kg (207 lb 3.7 oz), SpO2 95 %, not currently breastfeeding.    Activity: A-2  Neuros: AOX3, not time, makes needs known  Cardiac: WDL, denies chest pain  Respiratory: 2L/NC/95%, denies SOB  GI/: No urine dialysis pt, BM  Diet: Renal diet tolerating  Skin/Incisions/Drains: Bruises on forearms, scabs on shins, Rt AV fistula, Lt PIV   Lines: Lt PIV SL, Rt AV fistula  Pain: Denies pain  Plan: Continue with POC  "

## 2022-07-05 NOTE — PROGRESS NOTES
Physical Therapy Discharge Summary    Reason for therapy discharge:    Discharged to long term care facility.    Progress towards therapy goal(s). See goals on Care Plan in [facility] electronic health record for goal details.  Goals not met.  Barriers to achieving goals:   limited tolerance for therapy.    Therapy recommendation(s):    Continued therapy is recommended.  Rationale/Recommendations:  Impaired functional mobility.

## 2022-07-05 NOTE — PROGRESS NOTES
Care Management Follow Up    Length of Stay (days): 8    Expected Discharge Date: 07/05/2022     Concerns to be Addressed: Discharge planning      Patient plan of care discussed at interdisciplinary rounds: Yes    Anticipated Discharge Disposition: Long Term Care- Return to hospitals at [place]     Anticipated Discharge Services:  Outpatient Dialysis: [facility] [place] MWF    Anticipated Discharge DME:      Patient/family educated on Medicare website which has current facility and service quality ratings:  No as pt admitted from a LTC  Education Provided on the Discharge Plan:    Patient/Family in Agreement with the Plan:      Referrals Placed by CM/SW: Post Acute Facilities  Private pay costs discussed: Not applicable    Additional Information:  Pt is a 76 year old female being followed by [facility] for return to LTC after admitting from her HD unit with hypotension.     Per chart review pt resides in LTC at [facility].     VELASQUEZ received update from Dr. Jameson that pt will be ready for discharge back to her LTC later afternoon today. Dr. Jameson noted that pt needs IV Rocephyn/Ceftriaxone for a few days and was told this can be given at pt's dialysis unit. VELASQUEZ noted SW will look into this.     [facility]Z spoke with Nephrology MATT Riojas and she reported typically dialysis units don't have Ceftriaxone on hand as it is not renally dosed but said she will call pt's dialysis unit to confirm if the drug can/will be given there or not and will update VELASQUEZ, waiting to hear back. Beulah later notified Dr. Jameson that pt will not be able to get IV abx at HD unit.     VELASQUEZ contacted hospitals liaison Antonia (p. [phone], f. [phone], has access to Epic) via email and noted that pt is ready for discharge today and needs IV Rocephyn/Ceftriaxone for a few days, and will need to resume her outpatient HD, and asked if pt can return today, waiting to hear back.  Antonia later replied that pt's return with IV abx is  ok.     Per bedside nurse Gianna pt will need oxygen for transport and cannot manage this on her own, therefore stretcher transport is needed.     Stretcher transport (due to need for oxygen that pt cannot manage) arranged via  [facility] ([phone]) for today at 4pm.       ANA Moreland, LICSW  7B   [phone] (pager) [phone]  7/5/2022

## 2022-07-06 NOTE — PROGRESS NOTES
Clinic Care Coordination Contact    Situation: Patient chart reviewed by care coordinator.    Background: Patient referred to care coordination while inpatient.     Assessment: Per chart review, patient discharged home to a long term care facility where she has the full support of nursing home staff.     Plan/Recommendations: Patient is not a candidate for care coordination and will not be enrolled at this time.     Kamilla Herrera, RN Care Coordinator     [facility] Management  [facility] and   [email or web address]    Office: [phone]

## 2022-07-06 NOTE — CARE PLAN
Occupational Therapy Discharge Summary    Date of OT Discharge: July 5, 2022   Refer to daily doc flowsheet for equipment issued and current functional status.  Discharge Destination: LTC  Discharge Comments: Pt to discharge to LTC for ongoing cares. Kim Cordon    Please note that if goals are not fully met the patient is making progress towards established goals, which is documented in flowsheet notes. If further therapy is recommended it is related to documented deficits, and is necessary to maximize functional independence in order for patient to return to previous level of function.      7/6/2022 by Maggie Toussaint OTR/MANDY

## 2022-08-01 PROBLEM — E87.70 VOLUME OVERLOAD: Status: ACTIVE | Noted: 2022-01-01

## 2022-08-01 PROBLEM — Z99.2 ESRD ON DIALYSIS (H): Status: ACTIVE | Noted: 2019-11-14

## 2022-08-01 PROBLEM — N18.6 ESRD ON DIALYSIS (H): Status: ACTIVE | Noted: 2019-11-14

## 2022-08-01 NOTE — LETTER
Recipient: Fritz Billingsley [place]          Sender: VELASQUEZ Ibrahim [phone]              Date: August 9, 2022  Patient Name:  Kim Anne      The documents accompanying this notice contain confidential information belonging to the sender.  This information is intended only for the use of the individual or entity named above.  The authorized recipient of this information is prohibited from disclosing this information to any other party and is required to destroy the information after its stated need has been fulfilled, unless otherwise required by state law.    If you are not the intended recipient, you are hereby notified that any disclosure, copy, distribution or action taken in reliance on the contents of these documents is strictly prohibited.  If you have received this document in error, please return it by fax to [phone] with a note on the cover sheet explaining why you are returning it (e.g. not your patient, not your provider, etc.).  Documents may also be returned by mail to [employer], , [facility] [address] [place], [place] [address].
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
## 2022-08-01 NOTE — PROGRESS NOTES
Care Management Follow Up    Length of Stay (days): 0    Expected Discharge Date:  TBD     Concerns to be Addressed: discharge planning     Patient plan of care discussed at interdisciplinary rounds: No    Anticipated Discharge Disposition: Hospice, Skilled Nursing Facility, Long Term Care     Anticipated Discharge Services: Other (see comment) (Hospice)  Anticipated Discharge DME:      Patient/family educated on Medicare website which has current facility and service quality ratings:  (Family selected SNF)  Education Provided on the Discharge Plan:    Patient/Family in Agreement with the Plan: yes    Referrals Placed by CM/SW: Post Acute Facilities, Hospice  Private pay costs discussed: Not applicable    Additional Information:  Chart reviewed. Case discussed with bedside RN and medical provider. Writer request to see pt's guardian, Vandana.    Writer met with Vandana in pt's room. Writer introduced self, discussed role and went over writer's availability. Vandana reports that she pulled pt out of her previous LTC facility [facility], because they were not allowing visitors. Pt was brought to the ED today to get pt established with HD and O2.  Vandana then gave several different stories of whats been happening to pt and what she was hoping for SW to arrange. Writer first conversation with Vandana, she was requesting assistance with getting DME, O2, HD set up for pt to go back with her in [place]. Writer's second conversation with Vandana, she indicated that she wanted pt to go on hospice at home with her in [place] and that she has already been working with a [facility] hospice agency to have hospice DME delivered to her home. Writer's third conversation with Vandana, she indicated that she want pt to go to a SNF on hospice. Vandana requested the SNF be located in [facility] and that she has already been calling around and spoke with the [facility] and they have opening that can  "take pt. Writer [facility] V[facility] on how [facility], but that they have not accepted pt yet. Vandana reports she is agreeable to continuing [place] \"family is [place]\" in here in [facility].    Referrals have been made to the following facilities and their status is as follows:    [facility]  P: [phone] - [place] Liaison (Antonia)  P: [phone] - SNF direct  F: [phone]   - Writer left VM with SNF admissions liaison.  - Writer spoke with Angelina in  at SNF. Angelina reports she has spoken with Vandana, RE: pt and confirmed that they do have a female bed that can accept a hospice pt, but they have NOT accepted pt. Angelina requested that SW work with their patient transition liaison to facilitate a referral.   - Writer received email from Antonia, requesting a referral be sent. Insufficient documentation to submit referral as of the completion of this note. SW to f/u tomorrow.    ________________    ANA Jason, [facility]  ED/Observation   [facility]  Phone: [phone]  Pager: [phone]  Fax: [phone]    On-call pager, [phone], 4:00pm to midnight    "

## 2022-08-01 NOTE — LETTER
Recipient:          Sender: Corazon Ibrahim  [phone]              Date: August 9, 2022  Patient Name:  Kim Anne      The documents accompanying this notice contain confidential information belonging to the sender.  This information is intended only for the use of the individual or entity named above.  The authorized recipient of this information is prohibited from disclosing this information to any other party and is required to destroy the information after its stated need has been fulfilled, unless otherwise required by state law.    If you are not the intended recipient, you are hereby notified that any disclosure, copy, distribution or action taken in reliance on the contents of these documents is strictly prohibited.  If you have received this document in error, please return it by fax to [phone] with a note on the cover sheet explaining why you are returning it (e.g. not your patient, not your provider, etc.).  Documents may also be returned by mail to Health Information Management, , [facility] [address], [place], [place] [address].
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
## 2022-08-01 NOTE — LETTER
[facility]          Sender:  Corazon Ibrahim [phone]              Date: August 9, 2022  Patient Name:  Kim Anne      The documents accompanying this notice contain confidential information belonging to the sender.  This information is intended only for the use of the individual or entity named above.  The authorized recipient of this information is prohibited from disclosing this information to any other party and is required to destroy the information after its stated need has been fulfilled, unless otherwise required by state law.    If you are not the intended recipient, you are hereby notified that any disclosure, copy, distribution or action taken in reliance on the contents of these documents is strictly prohibited.  If you have received this document in error, please return it by fax to [phone] with a note on the cover sheet explaining why you are returning it (e.g. not your patient, not your provider, etc.).  Documents may also be returned by mail to Health Information Management, , [facility] [address], [place], [place] [address].
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
## 2022-08-02 NOTE — PROGRESS NOTES
[facility]    Medicine Progress Note - Hospitalist Service, GOLD TEAM 9    Date of Admission:  8/1/2022    Assessment & Plan          Addendum  August 2, 2022  2:29 PM     Called Guardian (sister's) phone again at [phone]: no answer        Kim Anne is a 76 year old female with a pmh of ESRD on IHD M/W/F via RUE fistula, CAD and history of NSTEMI, chronic diastolic heart failure, COPD with chronic hypoxic respiratory failure (2L NC at home), and dementia who presents with sister who is her guardian needing assistance setting up dispo.    #GOC  - per ED sw note possibly guardian interested in home hospice.   *called Vandana (guardian) 11:26 AM no response will continue to try to connect    1. ESRD on IHD M/W/F via RUE fistula  2. Fluid overloaded  3. Hyperkalemia  4. Hyponatremia  - Nephrology consulted, hemodialysis 8/2    #Hypotension  - taken on left extremity given RUE fistula, however Provider Advisory says left subclavian stenosis present thus these may not be accurate. On exam left hand cooler than right with weaker radial pulse which supports altered perfusion. Patient is also at risk for infection (recent bacteremia), monitor closey while blood cultures pending  *continue with home midodrine    3. Severe COPD  4. Chronic hypoxic respiratory failure  - currently on 2L NC at home and COPD is likely the cause of her hypoxia as she previously required home O2 for exertion prior to previous admission where she was discharge with continuous 2L NC after possible Strep Mitis bacteremia and possible aspiration PNA .  - patient saturating in mid 90s on 2L NC currently  - restart mucomyst, budesonide, formoterol, and  duoneb  - patient still an active smoker  - social work needed for home oxygen needs  - smoking cessation education    5. Chronic Diastolic Heart failure  6. Troponin leak  7. History of CAD  - ECHO 6/28 showed an EF on 55-60% with grade 3 or  advanced diastolic dysfunction with low normal RV function  - likely contributing along with COPD for shortness of breath  - patient without chest pain, heartburn, or jaw/arm numbness; troponin 152 in ED, repeat troponin 153  - will recheck again per protocol  - stable pleural effusions on chest xray with small left and trace right  - no acute ST elevation noted on ECG in ED  - ASA 81 and statin restarted    8. Type 2 diabetes  - currently not taking any PTA hyperglycemic medication  - will place on sliding scale insulin for now     9. Hyperlipidemia  - restart PTA lipitor    10. Hypothyroidism  - restart PTA synthroid --> increased from 200 to 250 mcg daily 8/2 for low T4    11. GERD  - restart PTA protonix    12. Anemia of Chronic Disease  - hgb 10.1 in ED  - hgb during previous admission stayed around mid 7s  - will transfuse if hbg <7     13. Advanced care planning  14. Dementia  - patient should have a goals of care discussion  - social work needed for help with SNF placement, IHD center, and home going oxygen support.  - patient's sister is the guardian    Diet:  Renal diet  DVT Prophylaxis: Heparin SQ and Pneumatic Compression Devices  Chaney Catheter: Not present  Central Lines: None  Cardiac Monitoring: None  Code Status:  Full Code         Diet: Renal Diet (dialysis)    DVT Prophylaxis: Heparin SQ  Chaney Catheter: Not present  Central Lines: None  Cardiac Monitoring: None  Code Status: Full Code      Disposition Plan      Expected Discharge Date: 08/02/2022,  3:00 PM    Destination: nursing home          The patient's care was discussed with the Patient.    Trevon Larsen MD  Hospitalist Service, GOLD TEAM 9  [facility]  Securely message with the Vocera Web Console (learn more here)  Text page via [facility] Paging/Directory   Please see signed in provider for up to date coverage information      Clinically Significant Risk Factors Present on Admission             "  # Acute Kidney Injury, unspecified: based on a >150% or 0.3 mg/dL increase in creatinine on admission compared to past 90 day average, will monitor renal function       # Obesity: Estimated body mass index is 34.85 kg/m  as calculated from the following:    Height as of this encounter: 1.651 m (5' 5\").    Weight as of this encounter: 95 kg (209 lb 7 oz).        ______________________________________________________________________    Interval History   Seen in ED. Wakes a bit to voice and stimulation one word answers    4 point ROS otherwise negative.     Data reviewed today: I reviewed all medications, new labs and imaging results over the last 24 hours. I personally reviewed the EKG tracing showing sinus bradycardia (not junctional as computer read suggested).    Physical Exam   Vital Signs: Temp: 97  F (36.1  C) Temp src: Axillary BP: (!) 71/44 Pulse: (!) 44   Resp: 9 SpO2: 99 % O2 Device: Nasal cannula Oxygen Delivery: 2 LPM  Weight: 209 lbs 6.99 oz    Physical Exam   Constitutional: appears tired / sleeping NAD  HEENT: EOMI  Neck: Symmetric  Cardiovascular: regular without murmurs or gallops  Pulmonary/Chest: bibasilar crackles. No respiratory distress.  GI: Soft, non-tender , non-distended    Musculoskeletal:  Tense pitting bilateral lower extremity edema   Skin: Skin is warm and dry  Neurological: Lethargic  Psychiatric:  euthymic      Data   Medications     heparin (porcine) 500 Units/hr (08/02/22 0859)     - MEDICATION INSTRUCTIONS -         acetylcysteine  4 mL Inhalation BID     aspirin  81 mg Oral At Bedtime     atorvastatin  40 mg Oral At Bedtime     budesonide  0.5 mg Nebulization BID     formoterol  20 mcg Nebulization Q12H     gabapentin  100 mg Oral Daily     heparin ANTICOAGULANT  5,000 Units Subcutaneous Q12H     insulin aspart  1-7 Units Subcutaneous TID AC     insulin aspart  1-5 Units Subcutaneous At Bedtime     [START ON 8/3/2022] levothyroxine  250 mcg Oral Daily     levothyroxine  50 mcg " Oral Once     midodrine  10 mg Oral TID w/meals     multivitamin RENAL  1 capsule Oral Daily     pantoprazole  40 mg Oral BID     vitamin B complex with vitamin C  1 tablet Oral Daily with supper     vitamin D3  50 mcg Oral Daily

## 2022-08-02 NOTE — PROVIDER NOTIFICATION
Provider notified regarding lower Bps, SBP<90. Pt has an order for TID Midodrine but the order does not start until 08/02 AM for breakfast. Paged provider to assess if midodrine can be scheduled now as patient did not receive her doses of midodrine 8/1. Will await provider response.    0118: Provider paged again with new BP 72/46 MAP 55, pt lethargic, arousable to voice.     0150: Provider at bedside to assess patient. Order for 10mg midodrine PO and urinalysis. Purewick placed for patient as she states she would not be able to void on bedpan. Patient also states that her baseline is voiding 2 times per day.    0338: Provider paged again after midodrine was given at 0220 for BP 87/49, MAP 64. Pt remains arousable to light stimulation, lethargic.     Provider called around 0445, /61, patient was more stimulated and awake at this time.   Pt continued to drift on and off to sleep overnight, MAPs>65 thereafter without intervention.    Will continue to monitor BP. Plan is for dialysis this morning.

## 2022-08-02 NOTE — PHARMACY-ADMISSION MEDICATION HISTORY
Admission Medication History Completed by Pharmacy    See [facility] Admission Navigator for allergy information, preferred outpatient pharmacy, prior to admission medications and immunization status.     Medication History Sources:     MAR from SNF    Changes made to PTA medication list (reason):    Added: omeprazole    Deleted: spiriva    Changed: None    Additional Information:    None    Prior to Admission medications    Medication Sig Last Dose Taking? Auth Provider Long Term End Date   acetaminophen (TYLENOL) 500 MG tablet Take 1,000 mg by mouth every evening  Yes Unknown, Entered By History     acetaminophen (TYLENOL) 500 MG tablet Take 1,000 mg by mouth 2 times daily as needed for mild pain  Yes Unknown, Entered By History     acetylcysteine (MUCOMYST) 10 % nebulizer solution Inhale 4 mLs into the lungs 2 times daily  Yes Analisa Swift MD     ammonium lactate (LAC-HYDRIN) 12 % external lotion Apply topically 2 times daily  Yes Ailyn Nieves APRN CNP     ASPIR-LOW 81 MG EC tablet Take 1 tablet (81 mg) by mouth At Bedtime  Yes Ailyn Nieves APRN CNP     atorvastatin (LIPITOR) 40 MG tablet Take 1 tablet (40 mg) by mouth At Bedtime  Yes Ailyn Nieves APRN CNP Yes    budesonide (PULMICORT) 0.5 MG/2ML neb solution Take 2 mLs (0.5 mg) by nebulization 2 times daily  Yes Analisa Swift MD Yes    Emollient (EUCERIN CALMING DAILY MOIST) CREA Externally apply 1 dose. topically daily  Yes Ailyn Nieves APRN CNP     formoterol (PERFOROMIST) 20 MCG/2ML neb solution Take 2 mLs (20 mcg) by nebulization every 12 hours  Yes Analisa Swift MD Yes    gabapentin (NEURONTIN) 100 MG capsule Take 100 mg by mouth daily  Yes Unknown, Entered By History     ipratropium - albuterol 0.5 mg/2.5 mg/3 mL (DUONEB) 0.5-2.5 (3) MG/3ML neb solution Take 1 vial by nebulization every 6 hours as needed for shortness of breath / dyspnea or wheezing  Yes Unknown, Entered By History No    levothyroxine (SYNTHROID/LEVOTHROID) 200  MCG tablet Take 200 mcg by mouth daily  Yes Unknown, Entered By History No    LORazepam (ATIVAN) 0.5 MG tablet Take 0.5 mg by mouth three times a week Mon/Wed/Fri prior to HD sessions  Yes Unknown, Entered By History     midodrine (PROAMATINE) 10 MG tablet Take 1 tablet (10 mg) by mouth 3 times daily (with meals)  Patient taking differently: Take 10 mg by mouth 3 times daily  Yes Analisa Swift MD     multivitamin RENAL (NEPHROCAPS/TRIPHROCAPS) 1 MG capsule Take 1 capsule by mouth daily  Yes Ailyn Nieves APRN CNP     nitroGLYcerin (NITROSTAT) 0.4 MG sublingual tablet Place 1 tablet (0.4 mg) under the tongue every 5 minutes as needed for chest pain  Yes Ailyn Nieves APRN CNP Yes    omeprazole (PRILOSEC) 20 MG DR capsule Take 20 mg by mouth 2 times daily  Yes Unknown, Entered By History     polyethylene glycol (MIRALAX) packet Take 17 g by mouth daily as needed for constipation  Yes Flaktio Pickens MD     senna-docusate (SENOKOT-S/PERICOLACE) 8.6-50 MG tablet Take 2 tablets by mouth At Bedtime  Yes Unknown, Entered By History     vitamin B complex with vitamin C (STRESS TAB) tablet Take 1 tablet by mouth daily (with dinner)  Yes Unknown, Entered By History     vitamin D3 (CHOLECALCIFEROL) 2000 units (50 mcg) tablet Take 1 tablet (2,000 Units) by mouth daily  Yes Flakito Pickens MD Yes    blood glucose monitoring (FREESTYLE LITE) test strip TEST BLOOD SUGAR TWO TIMES A DAY   Ailyn Nieves APRN CNP     blood glucose monitoring (FREESTYLE) lancets Test BS two times daily as directed   Ailyn Nieves APRN CNP     albuterol (PROAIR HFA/PROVENTIL HFA/VENTOLIN HFA) 108 (90 Base) MCG/ACT inhaler Inhale 2 puffs into the lungs every 6 hours as needed for shortness of breath / dyspnea or wheezing   Flakito Pickens MD Yes 7/5/22   clopidogrel (PLAVIX) 75 MG tablet Take 1 tablet (75 mg) by mouth daily   Efrain Mccain PA-C Yes 7/5/22   fluticasone-salmeterol (ADVAIR) 250-50 MCG/DOSE inhaler Inhale 1 puff  into the lungs every 12 hours   Flakito Pickens MD Yes 7/5/22       Date completed: 08/02/22    Medication history completed by: Jenna Harrison, PharmD
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
## 2022-08-02 NOTE — PROGRESS NOTES
HEMODIALYSIS TREATMENT NOTE    Date: 8/2/2022  Time: 12:41 PM    Data:  Pre Wt:   95 kg  Desired Wt: 92  kg   Post Wt:  95.3 kg  Weight change: .3  kg  Ultrafiltration - Post Run Net Total Removed (mL): 0 mL  Vascular Access Status: patent  Dialyzer Rinse: Streaked, Light  Total Blood Volume Processed: 62.98 L Liters  Total Dialysis (Treatment) Time: 3 Hours    Lab:   No    Interventions:  Heparin  Albumin 5%  Albumin 25%    Assessment:  Pt ran for 3 hours on a K2/ Ca 3 bath with a /  and 0 L pulled 250 ml net gain. Pt had hypotension throughout tx. Heparin admin per MAR. Albumin prime and bolus during tx to support BP per MAR. Treatment terminated 30 mins early due to SBP 69, CNS Arnold aware. Pt rinsed back and hemostasis achieved in about 10 mins. Pt alert and oriented x4 and rested throughout the tx. Pt did not not eat breakfast, food tray sent to room. Report given to PCN     Plan:    Per renal team

## 2022-08-03 NOTE — PLAN OF CARE
Goal Outcome Evaluation:    Plan of Care Reviewed With: patient     Overall Patient Progress: no change    Outcome Evaluation: Pt admitted from ED. Disoriented to place and time. Hypoactive and flat affect. Lift to transfer. Repo w/ A2. On 2L O2 NC. Systolic BPs in the 70s-80s. Received albumin. Per crosscover, ok to take BP on L leg. Productive cough. R arm fistula. Anuric. Last BM 8/1 per pt report. BGs stable. L PIV-SL. Renal diet, poor appetite. Will cont to monitor.      Admission/Transfer from: ED  2 RN skin assessment completed. Yes with/ Katlyn PEREZ RN  Significant findings include: Scabbing on abdomen  WOC Nurse Consult Ordered? No  Was NST/NA able to join during assessment? No  Bed algorithm can be found in PCS flowsheets and forms binder, was this used for this assessment? Yes  Was a pulsate mattress ordered? Yes

## 2022-08-03 NOTE — PLAN OF CARE
Goal Outcome Evaluation:    Soft BP's done on left leg, pt is disorientated to person, situation and time, orientated to place, able to tolerate regular diet with feeding, pt just spills food all over herself if feeds self, anuric, repo q 2 hours, no BM today, pt denies pain, bradycardic in the 50's

## 2022-08-03 NOTE — PLAN OF CARE
Goal Outcome Evaluation: no change    A&O x2 to person and place. Disoriented to time  BP soft 91/50 to 105/62. Bradycardic 48-57. Gold 9 MD made aware  Had Dialysis today  On 2L NC. Lungs coarse crackles. ARAUZ  Anuric. No BM today   On Renal diet with poor appetite  Has R arm fistula and PIV in left arm SL  Turns and repositions with 2 assist.     Pt transfers to  at 7:15pm. Reports given to floor nurse

## 2022-08-03 NOTE — PROGRESS NOTES
Nephrology Progress Note  08/03/2022       Kim Anne is a 76 yof with complex medical hx including ESRD on MWF HD, Severe COPD on home O2 (still smoking), HFpEFF, CAD, DM2, HLP, who is admitted with failure to thrive and volume overload due to hypotension on dialysis.  Nephrology consulted for management of dialysis.      Interval History :   Mrs Anne had HD yesterday, chemistries reasonable today.  Plan for HD tomorrow as long as BP's will support it (BP's a bit improved today) and pt and family want to continue.  Should be able to UF some tomorrow if BP's remain stable.      Assessment & Recommendations:   ESRD-On HD MWF generally, had last run before admission on Friday 7/29 then ran 8/2 as inpatient.  Did not pull fluid as SBP was ~80mmHg throughout run although this improved overnight.  Will run HD as needed as long as BP's are stable, per my discussion with pt's sister they were considering hospice as an options so will see where these discussions lead.                  -Holding on run today, plan for HD tomorrow pending [facility] discussions.                  -Access is R arm fistula.       Volume-Up ~10kg from EDW, unable to pull fluid on run yesterday due to hypotension but BP's a bit better overnight.  Will try to UF as able tomorrow if still stable from hemodynamic standpoint.       Electrolytes-K 4.5, bicarb 25, Na 131, no acute issues.      BMD-Ca 8.6, Mg and Phos reasonable.      Anemia-Hgb 9.3, up from 7's during admission in June.  Giving EPO with runs.       Nutrition-Taking PO, appetite ok per her report.      Time spent: 30 minutes on this date of encounter for chart review, physical exam, medical decision making and co-ordination of care.      Seen and discussed with Dr Cid     Recommendations were communicated to primary team via note    JUNIOR Green CNS  Clinical Nurse Specialist  [phone]    Review of Systems:   I reviewed the following systems:  Gen: No fevers or  "chills  CV: No CP at rest  Resp: No SOB at rest  GI: No N/V    Physical Exam:   I/O last 3 completed shifts:  In: 490 [P.O.:240; Other:250]  Out: 0    BP (!) (P) 87/45 (BP Location: Left arm)   Pulse (P) 52   Temp (!) (P) 96.4  F (35.8  C) (Axillary)   Resp (P) 16   Ht 1.651 m (5' 5\")   Wt 95 kg (209 lb 7 oz)   SpO2 (P) 99%   BMI 34.85 kg/m       GENERAL APPEARANCE: Seen on HD, waxing and waning mental status.   EYES: No scleral icterus  NECK:  Difficult to assess JVD  Pulmonary: lungs rhonchi to auscultation with equal breath sounds bilaterally, no clubbing or cyanosis  CV: Regular rhythm, normal rate, no rub   - Edema +3 LE  GI: soft, nontender  MS: no evidence of inflammation in joints, no muscle tenderness  : No Chaney  SKIN: no rash, warm, dry  NEURO: Answering questions, variable AxO x1-2    Labs:   All labs reviewed by me  Electrolytes/Renal - Recent Labs   Lab Test 08/03/22  1154 08/03/22  0813 08/03/22  0658 08/02/22  0759 08/02/22  0650 08/01/22  2149 08/01/22  1515 07/04/22  0740 07/02/22  0516 07/01/22  0556 06/27/22  1126 10/20/21  0855   NA  --   --  131*  --  134*  --  135* 134* 133* 133*   < > 138   POTASSIUM  --   --  4.5  --  5.3  --  5.6* 4.4 4.1 4.0   < > 3.9   CHLORIDE  --   --  95*  --  94*  --  94* 98 97* 98   < > 101   CO2  --   --  25  --  29  --  23 23 23 25   < > 30   BUN  --   --  26.4*  --  40.2*  --  36.2* 21.1 25.1* 20.6   < > 37*   CR  --   --  3.03*  --  3.99*  --  3.67* 3.07* 2.98* 2.38*   < > 3.73*   * 118* 115*   < > 111*   < > 101* 80 103* 102*   < > 158*   FRANCES  --   --  8.6*  --  8.9  --  9.2 8.7* 8.2* 8.3*   < > 8.7   MAG  --   --   --   --   --   --   --   --  1.7 1.5*  --  1.7   PHOS  --   --  4.9*  --  5.7*  --   --  4.6*  --   --   --   --     < > = values in this interval not displayed.       CBC -   Recent Labs   Lab Test 08/03/22  0658 08/01/22  1515 07/04/22  0740   WBC 4.5 4.8 5.3   HGB 9.3* 10.1* 7.6*    263 226       LFTs -   Recent Labs   Lab " Test 08/03/22  0658 07/04/22  0740 06/27/22  1612 06/27/22  1128   ALKPHOS  --  228* 312* 346*   BILITOTAL  --  0.3 0.5 0.4   ALT  --  8* 13 14   AST  --  21 37* 23   PROTTOTAL  --  6.0* 6.9 6.1*   ALBUMIN 3.4* 2.9* 3.6 3.0*       Iron Panel -   Recent Labs   Lab Test 06/28/22  0454 06/03/21  0533 06/03/21  0349 02/25/20  0955   IRON 35*  --  27* 47   IRONSAT 16  --  7* 20   *   < >  --  1,088*    < > = values in this interval not displayed.           Current Medications:    acetylcysteine  4 mL Inhalation BID     aspirin  81 mg Oral At Bedtime     atorvastatin  40 mg Oral At Bedtime     budesonide  0.5 mg Nebulization BID     formoterol  20 mcg Nebulization Q12H     gabapentin  100 mg Oral Daily     heparin ANTICOAGULANT  5,000 Units Subcutaneous Q12H     insulin aspart  1-7 Units Subcutaneous TID AC     insulin aspart  1-5 Units Subcutaneous At Bedtime     levothyroxine  250 mcg Oral Daily     midodrine  10 mg Oral TID w/meals     multivitamin RENAL  1 capsule Oral Daily     pantoprazole  40 mg Oral BID     vitamin B complex with vitamin C  1 tablet Oral Daily with supper     vitamin D3  50 mcg Oral Daily

## 2022-08-03 NOTE — PLAN OF CARE
Goal Outcome Evaluation:    Plan of Care Reviewed With: guardian     Overall Patient Progress: no change

## 2022-08-03 NOTE — PROGRESS NOTES
[facility]    Medicine Progress Note - Hospitalist Service, GOLD TEAM 9    Date of Admission:  8/1/2022    Assessment & Plan          Kim Anne is a 76 year old female with a pmh of ESRD on IHD M/W/F via RUE fistula, CAD and history of NSTEMI, chronic diastolic heart failure, COPD with chronic hypoxic respiratory failure (2L NC at home), and dementia who presents with sister who is her guardian needing assistance setting up dispo.    #GOC  - per ED sw note possibly guardian interested in home hospice.   *called Vandana (guardian) 1:48 PM and we discussed patient's goals and her preferences. Vandana states that patient's goals are [facility]. Until we clarify the details/options around this and until pt is enrolled with Hospice I told Vandana we would continue dialysis, however she expressed understanding that Hospice would mean we would stop dialysis at that point.    1. ESRD on IHD M/W/F via RUE fistula  2. Fluid overloaded  3. Hyperkalemia  4. Hyponatremia  - Nephrology consulted, hemodialysis 8/2    #Hypotension  - taken on left extremity given RUE fistula, however Provider Advisory says left subclavian stenosis present thus these may not be accurate. On exam left hand cooler than right with weaker radial pulse which supports altered perfusion. Patient is also at risk for infection (recent bacteremia), monitor closey while blood cultures pending  *continue with home midodrine    3. Severe COPD  4. Chronic hypoxic respiratory failure  - currently on 2L NC at home and COPD is likely the cause of her hypoxia as she previously required home O2 for exertion prior to previous admission where she was discharge with continuous 2L NC after possible Strep Mitis bacteremia and possible aspiration PNA .  - patient saturating in mid 90s on 2L NC currently  - restart mucomyst, budesonide, formoterol, and  duoneb    5. Chronic Diastolic Heart  failure  6. Troponin leak  7. History of CAD  - ECHO 6/28 showed an EF on 55-60% with grade 3 or advanced diastolic dysfunction with low normal RV function  - chronically elvated troponin given ESRD  - ASA 81 and statin restarted    8. Type 2 diabetes  - currently not taking any PTA hyperglycemic medication  . No blood sugar checks indicated given trend normal    9. Hyperlipidemia  - restart PTA lipitor    10. Hypothyroidism  - restart PTA synthroid --> increased from 200 to 250 mcg daily 8/2 for low T4    11. GERD  - restart PTA protonix    12. Anemia of Chronic Disease  - hgb 10.1 in ED  - hgb during previous admission stayed around mid 7s  - will transfuse if hbg <7     13. Advanced care planning  14. Dementia  - social work needed for help with SNF placement, IHD center, and home going oxygen support.  - patient's sister is the guardian    Diet:  Renal diet  DVT Prophylaxis: Heparin SQ and Pneumatic Compression Devices  Chaney Catheter: Not present  Central Lines: None  Cardiac Monitoring: None  Code Status:  Full Code         Diet: Renal Diet (dialysis)    DVT Prophylaxis: Heparin SQ  Chaney Catheter: Not present  Central Lines: None  Cardiac Monitoring: None  Code Status: Full Code      Disposition Plan      Expected Discharge Date: 08/03/2022      Destination: long-term care facility  Discharge Comments: Medically ready for discharge. I called Vandana Dasilvacamryn 8/3 13:47, she confirms she's hoping for patient to go to [facility] with Hospice, is that what we are working on? THanks -Dr. Larsen        The patient's care was discussed with the Patient.. alia Larsen MD  Hospitalist Service, GOLD TEAM [address] [place] [address]  Securely message with the Vocera Web Console (learn more here)  Text page via [facility] Paging/Directory   Please see signed in provider for up to date coverage information      Clinically Significant Risk Factors Present on Admission                #  "Obesity: Estimated body mass index is 34.85 kg/m  as calculated from the following:    Height as of this encounter: 1.651 m (5' 5\").    Weight as of this encounter: 95 kg (209 lb 7 oz).        ______________________________________________________________________    Interval History   Upright in bed watching TV. Knows her name, not the year or month or location. Asks for \"movie\"    4 point ROS otherwise negative.     Data reviewed today: I reviewed all medications, new labs and imaging results over the last 24 hours. I personally reviewed the EKG tracing showing sinus bradycardia (not junctional as computer read suggested).    Physical Exam   Vital Signs: Temp: 97.5  F (36.4  C) Temp src: Oral BP: (!) 139/27 (MAP 74) Pulse: 55   Resp: 16 SpO2: 98 % O2 Device: Nasal cannula Oxygen Delivery: 2 LPM  Weight: 209 lbs 6.99 oz    Physical Exam   Constitutional: NAD  HEENT: EOMI  Neck: Symmetric  Cardiovascular: regular without murmurs or gallops  Pulmonary/Chest: bibasilar crackles. No respiratory distress.  GI: Soft, non-tender , non-distended    Musculoskeletal:  Tense pitting bilateral lower extremity edema   Skin: Skin is warm and dry  Neurological: alert, oriented to self only  Psychiatric:  euthymic      Data   Medications       acetylcysteine  4 mL Inhalation BID     aspirin  81 mg Oral At Bedtime     atorvastatin  40 mg Oral At Bedtime     budesonide  0.5 mg Nebulization BID     formoterol  20 mcg Nebulization Q12H     gabapentin  100 mg Oral Daily     heparin ANTICOAGULANT  5,000 Units Subcutaneous Q12H     insulin aspart  1-7 Units Subcutaneous TID AC     insulin aspart  1-5 Units Subcutaneous At Bedtime     levothyroxine  250 mcg Oral Daily     midodrine  10 mg Oral TID w/meals     multivitamin RENAL  1 capsule Oral Daily     pantoprazole  40 mg Oral BID     vitamin B complex with vitamin C  1 tablet Oral Daily with supper     vitamin D3  50 mcg Oral Daily     "

## 2022-08-03 NOTE — PROVIDER NOTIFICATION
MD notified of BP 82/43. Pt asymptomatic. Per MD, no interventions at this moment, will recheck BP in an hour.

## 2022-08-03 NOTE — CONSULTS
Care Management Initial Consult    General Information  Assessment completed with: Other (Guardian/Sister), Vandana  Type of CM/SW Visit: Initial Assessment    Primary Care Provider verified and updated as needed:   No  Readmission within the last 30 days: no previous admission in last 30 days      Reason for Consult: discharge planning, other (see comments) (Elevated Risk Score)  Advance Care Planning: Advance Care Planning Reviewed: present on chart          Communication Assessment  Patient's communication style: spoken language (English or Bilingual)    Hearing Difficulty or Deaf: no   Wear Glasses or Blind: no    Cognitive  Cognitive/Neuro/Behavioral: .WDL except   Level of Consciousness: alert   Arousal Level: opens eyes spontaneously    Orientation: disoriented to, place, time    Mood/Behavior: flat affect  Best   Language: 0 - No aphasia    Speech: slow, clear    Living Environment:   People in home: facility resident     Current living Arrangements: residential facility      Able to return to prior arrangements: no  Living Arrangement Comments: Guardian pulled pt out previous LTC facility ([facility]) to take pt home for hospice cares    Family/Social Support:  Care provided by:  LTC facility staff  Provides care for: no one, unable/limited ability to care for self     Support System: Children, Sibling(s)          Description of Support System: Supportive, Involved         Current Resources:   Patient receiving home care services:  no     Community Resources:    Equipment currently used at home: wheelchair, power, walker, standard  Supplies currently used at home:  none    Employment/Financial:  Employment Status: unemployed        Financial Concerns: No concerns identified   Referral to Financial Worker: No       Lifestyle & Psychosocial Needs:  Social Determinants of Health     Tobacco Use: High Risk     Smoking Tobacco Use: Current Every Day Smoker     Smokeless Tobacco Use: Never Used   Alcohol  Use: Not on file   Financial Resource Strain: Not on file   Food Insecurity: Not on file   Transportation Needs: Not on file   Physical Activity: Not on file   Stress: Not on file   Social Connections: Not on file   Intimate Partner Violence: Not on file   Depression: Not at risk     PHQ-2 Score: 0   Housing Stability: Not on file       Functional Status:  Prior to admission patient needed assistance:   Dependent ADLs:: Wheelchair-independent  Dependent IADLs:: Cleaning, Cooking, Laundry, Meal Preparation, Medication Management, Money Management, Transportation, Shopping       Mental Health Status:  Mental Health Status: No Current Concerns       Chemical Dependency Status:  Chemical Dependency Status: No Current Concerns             Values/Beliefs:  Spiritual, Cultural Beliefs, Restoration Practices, Values that affect care: no               Additional Information:   SW made referral to [facility] via [employer] 8/3.  [facility] (Ph: [phone], F: [phone])    SW called pt's guardian/sister, Vandana to complete initial assessment via phone. Vandana answered and reported that pt had been accepted that at [facility] LT facility. SW reiterated that it appeared that [facility] had not yet approved pt for their facility yet and updated her that SW had sent referral this morning and would f/u with them this afternoon to see if they had gotten a chance to review it. SW confirmed that SW would keep Vandana updated. Vandana reported that pt came from [facility] but they had taken her out d/t the facility not allowing visitors. Vandana reported they were assisting her with some ADLs and IADLs. Vandana reported that pt's daughter is sometimes involved. Pt receives Social Security and Vandana reported there were no financial concerns at this time nor were there any mental health or chemical dependency concerns at this time. Vandana reported that transporting pt could be  difficult d/t her w/c. Pt may need w/c accessible ride at discharge.     Addendum 1436: VELASQUEZ attempted to call Antonia, liaison with [facility] ([phone]), to follow up on the referral this morning. There was no answer. SW left VM explaining purpose of call and that referral was for LTC placement, requesting call back.    Addendum 5261: VELASQUEZ received a call back from Antonia explaining that pt's previous facility, [facility] [place], discharged pt with plans for going home on hospice but pt's guardian did not comply with discharge instructions and therefore pt ran out of O2. Due to guardian's noncompliance of discharge plan, [facility] is weary to take pt back and pt has been globally denied from all [facility] facilities.     VELASQUEZ will continue to follow for discharge planning.    Kiki Lopez, VELASQUEZ  [facility]   [facility]

## 2022-08-04 PROBLEM — Z99.81 DEPENDENCE ON SUPPLEMENTAL OXYGEN: Status: ACTIVE | Noted: 2022-01-01

## 2022-08-04 PROBLEM — Z20.822 CONTACT WITH AND (SUSPECTED) EXPOSURE TO COVID-19: Status: ACTIVE | Noted: 2022-01-01

## 2022-08-04 PROBLEM — N18.6 TYPE 2 DIABETES MELLITUS WITH ESRD (END-STAGE RENAL DISEASE) (H): Status: ACTIVE | Noted: 2022-01-01

## 2022-08-04 PROBLEM — E87.70 HYPERVOLEMIA, UNSPECIFIED HYPERVOLEMIA TYPE: Status: ACTIVE | Noted: 2022-01-01

## 2022-08-04 PROBLEM — E11.22 TYPE 2 DIABETES MELLITUS WITH ESRD (END-STAGE RENAL DISEASE) (H): Status: ACTIVE | Noted: 2022-01-01

## 2022-08-04 PROBLEM — Z99.2 DEPENDENCE ON RENAL DIALYSIS (H): Status: ACTIVE | Noted: 2022-01-01

## 2022-08-04 NOTE — PROGRESS NOTES
Nephrology Progress Note  08/04/2022         Kim Anne is a 76 yof with complex medical hx including ESRD on MWF HD, Severe COPD on home O2 (still smoking), HFpEFF, CAD, DM2, HLP, who is admitted with failure to thrive and volume overload due to hypotension on dialysis.  Nephrology consulted for management of dialysis.       Interval History :   Mrs Anne was seen on HD this am, pulling 2-3L as BP's are improved from last run without specific intervention.  She feels a bit better generally, has no SOB or pain.       Assessment & Recommendations:   ESRD-On HD MWF generally, had last run before admission on Friday 7/29 then ran 8/2 as inpatient.  Did not pull fluid as SBP was ~80mmHg throughout run although this improved overnight.  Will run HD as needed as long as BP's are stable, per my discussion with pt's sister they were considering hospice as an option so will see where these discussions lead.                  -HD today, pulling 2-3L as BP's are stable.                  -Access is R arm fistula.       Volume-Up ~10kg from EDW, unable to pull fluid on last run due to hypotension but BP's a bit better today.  Planning 2-3L of UF as BP's allow.       Electrolytes-K 3.7, bicarb 27, Na 131, no acute issues.      BMD-Ca 8.2, Mg and Phos reasonable.      Anemia-Hgb 9.9, up from 7's during admission in June.  Giving EPO with runs.       Nutrition-Taking PO, appetite ok per her report.      Time spent: 30 minutes on this date of encounter for chart review, physical exam, medical decision making and co-ordination of care.      Seen and discussed with Dr Cid     Recommendations were communicated to primary team via note       JUNIOR Green CNS  Clinical Nurse Specialist  [phone]      Review of Systems:   I reviewed the following systems:  Gen: No fevers or chills  CV: No CP at rest  Resp: No SOB at rest  GI: No N/V    Physical Exam:   No intake/output data recorded.   /52   Pulse 54    "Temp 97.7  F (36.5  C) (Axillary)   Resp 16   Ht 1.651 m (5' 5\")   Wt 96.1 kg (211 lb 13.8 oz)   SpO2 98%   BMI 35.26 kg/m       GENERAL APPEARANCE: Seen on HD, waxing and waning mental status.   EYES: No scleral icterus  NECK:  Difficult to assess JVD  Pulmonary: lungs rhonchi to auscultation with equal breath sounds bilaterally, no clubbing or cyanosis  CV: Regular rhythm, normal rate, no rub   - Edema +3 LE  GI: soft, nontender  MS: no evidence of inflammation in joints, no muscle tenderness  : No Chaney  SKIN: no rash, warm, dry  NEURO: Answering questions, variable AxO x1-2    Labs:   All labs reviewed by me  Electrolytes/Renal - Recent Labs   Lab Test 08/04/22  0853 08/04/22  0659 08/03/22  1154 08/03/22  0813 08/03/22  0658 08/02/22  0759 08/02/22  0650 07/04/22  0740 07/02/22  0516 07/01/22  0556 06/27/22  1126 10/20/21  0855   * 128*  --   --  131*  --  134*   < > 133* 133*   < > 138   POTASSIUM 3.7 4.8  --   --  4.5  --  5.3   < > 4.1 4.0   < > 3.9   CHLORIDE 95* 92*  --   --  95*  --  94*   < > 97* 98   < > 101   CO2 27 23  --   --  25  --  29   < > 23 25   < > 30   BUN 19.8 30.0*  --   --  26.4*  --  40.2*   < > 25.1* 20.6   < > 37*   CR 2.56* 3.61*  --   --  3.03*  --  3.99*   < > 2.98* 2.38*   < > 3.73*   * 87 113*   < > 115*   < > 111*   < > 103* 102*   < > 158*   FRANCES 8.2* 9.0  --   --  8.6*  --  8.9   < > 8.2* 8.3*   < > 8.7   MAG  --   --   --   --   --   --   --   --  1.7 1.5*  --  1.7   PHOS  --  5.3*  --   --  4.9*  --  5.7*   < >  --   --   --   --     < > = values in this interval not displayed.       CBC -   Recent Labs   Lab Test 08/04/22  0659 08/03/22  0658 08/01/22  1515   WBC 5.3 4.5 4.8   HGB 9.9* 9.3* 10.1*    234 263       LFTs -   Recent Labs   Lab Test 08/04/22  0659 08/03/22  0658 07/04/22  0740 06/27/22  1612 06/27/22  1128   ALKPHOS  --   --  228* 312* 346*   BILITOTAL  --   --  0.3 0.5 0.4   ALT  --   --  8* 13 14   AST  --   --  21 37* 23   PROTTOTAL  " --   --  6.0* 6.9 6.1*   ALBUMIN 3.5 3.4* 2.9* 3.6 3.0*       Iron Panel -   Recent Labs   Lab Test 06/28/22  0454 06/03/21  0533 06/03/21  0349 02/25/20  0955   IRON 35*  --  27* 47   IRONSAT 16  --  7* 20   *   < >  --  1,088*    < > = values in this interval not displayed.           Current Medications:    acetylcysteine  4 mL Inhalation BID     aspirin  81 mg Oral At Bedtime     atorvastatin  40 mg Oral At Bedtime     budesonide  0.5 mg Nebulization BID     epoetin jacobo-epbx (RETACRIT) inj ESRD  4,000 Units Intravenous Once in dialysis/CRRT     formoterol  20 mcg Nebulization Q12H     gabapentin  100 mg Oral Daily     heparin ANTICOAGULANT  5,000 Units Subcutaneous Q12H     levothyroxine  250 mcg Oral Daily     midodrine  10 mg Oral TID w/meals     multivitamin RENAL  1 capsule Oral Daily     pantoprazole  40 mg Oral BID     vitamin B complex with vitamin C  1 tablet Oral Daily with supper     vitamin D3  50 mcg Oral Daily       - MEDICATION INSTRUCTIONS -

## 2022-08-04 NOTE — PLAN OF CARE
Goal Outcome Evaluation:    Plan of Care Reviewed With: patient     Overall Patient Progress: no change    Outcome Evaluation: Pt disoriented to situation and time. BP remain soft, pulse in the 50s, on 2L O2. Repositioned Q2.

## 2022-08-04 NOTE — PROGRESS NOTES
HEMODIALYSIS TREATMENT NOTE    Date: 8/4/2022  Time: 2:27 PM    Data:  Pre Wt:  96.1 kg  Desired Wt:  93.1 kg   Post Wt:  93.6 kg (calculated)  Weight change:  2.5 kg  Ultrafiltration - Post Run Net Total Removed (mL): 2500 mL  Vascular Access Status: patent  Dialyzer Rinse: Light, Streaked  Total Blood Volume Processed: 86.11 L Liters  Total Dialysis (Treatment) Time: 3.5 Hours    Lab:   Yes    Interventions:  EPO    Assessment:  Pt ran for 3.5 hours started on a K2/Ca2.5 bath and switched to K3/Ca3 bath after labs resulted with 450BFR/600DFR. Pt requested off machine 1 hour early, educated/encouraged to stay by Vargas VELASQUEZ. Pt completed run. Treatment otherwise uneventful. Pt rinsed back and hemostasis achieved in about 8 minutes.      Plan:    Per renal team

## 2022-08-04 NOTE — PROGRESS NOTES
[facility]    Medicine Progress Note - Hospitalist Service, GOLD TEAM 9    Date of Admission:  8/1/2022    Assessment & Plan          Kim Anne is a 76 year old female with a pmh of ESRD on IHD M/W/F via RUE fistula, CAD and history of NSTEMI, chronic diastolic heart failure, COPD with chronic hypoxic respiratory failure (2L NC at home), and dementia who presents with sister who is her guardian needing assistance setting up dispo.    #GOC  - per ED sw note possibly guardian interested in home hospice.     8/4: called Vandana (guardian) 1:48 PM and we discussed patient's goals and her preferences. Vandana states that patient's goals are for [facility]. Until we clarify the details/options around this and until pt is enrolled with Hospice I told Vandana we would continue dialysis, however she expressed understanding that Hospice would mean we would stop dialysis at that point.    8/5: per SW inquiries, unsafe to discharge to daughter's home and last transition under investigation. Given unclear communication and actions from patient' guardian will keep admission code status of FULL CODE and continue all medical care until situation sorted out    1. ESRD on IHD M/W/F via RUE fistula  2. Fluid overloaded  3. Hyperkalemia  4. Hyponatremia  - Nephrology consulted, hemodialysis     #Hypotension  - taken on left extremity given RUE fistula, however Provider Advisory says left subclavian stenosis present thus these may not be accurate. On exam left hand cooler than right with weaker radial pulse which supports altered perfusion. Patient is also at risk for infection (recent bacteremia), monitor closey while blood cultures pending  *continue with home midodrine    3. Severe COPD  4. Chronic hypoxic respiratory failure  - currently on 2L NC at home and COPD is likely the cause of her hypoxia as she previously required home O2 for exertion prior to  previous admission where she was discharge with continuous 2L NC after possible Strep Mitis bacteremia and possible aspiration PNA .  - patient saturating in mid 90s on 2L NC currently  - restart mucomyst, budesonide, formoterol, and  duoneb    5. Chronic Diastolic Heart failure  6. Troponin leak  7. History of CAD  - ECHO 6/28 showed an EF on 55-60% with grade 3 or advanced diastolic dysfunction with low normal RV function  - chronically elvated troponin given ESRD  - ASA 81 and statin restarted    8. Type 2 diabetes  - currently not taking any PTA hyperglycemic medication  . No blood sugar checks indicated given trend normal    9. Hyperlipidemia  - restart PTA lipitor    10. Hypothyroidism  - restart PTA synthroid --> increased from 200 to 250 mcg daily 8/2 for low T4    11. GERD  - restart PTA protonix    12. Anemia of Chronic Disease  - hgb 10.1 in ED  - hgb during previous admission stayed around mid 7s  - will transfuse if hbg <7     13. Advanced care planning  14. Dementia  - social work needed for help with SNF placement, IHD center, and home going oxygen support.  - patient's sister is the guardian    Diet:  Renal diet  DVT Prophylaxis: Heparin SQ and Pneumatic Compression Devices  Chaney Catheter: Not present  Central Lines: None  Cardiac Monitoring: None  Code Status:  Full Code         Diet: Renal Diet (dialysis)    DVT Prophylaxis: Heparin SQ  Chaney Catheter: Not present  Central Lines: None  Cardiac Monitoring: None  Code Status: Full Code      Disposition Plan      Expected Discharge Date: 08/04/2022      Destination: long-term care facility  Discharge Comments: Medically ready for discharge. I appreciate VELASQUEZ note 8/4 sound like a complex scenario.. -B        The patient's care was discussed with the Patient..VELASQUEZ Larsen MD  Hospitalist Service, GOLD TEAM [address] [place] [address]  Securely message with the Vocera Web Console (learn more here)  Text page via  "[facility] Paging/Directory   Please see signed in provider for up to date coverage information      Clinically Significant Risk Factors Present on Admission                # Obesity: Estimated body mass index is 35.26 kg/m  as calculated from the following:    Height as of this encounter: 1.651 m (5' 5\").    Weight as of this encounter: 96.1 kg (211 lb 13.8 oz).        ______________________________________________________________________    Interval History   Denies complaint, agrees she wants to watch TV.    4 point ROS otherwise negative.     Data reviewed today: I reviewed all medications, new labs and imaging results over the last 24 hours. I personally reviewed the EKG tracing showing sinus bradycardia (not junctional as computer read suggested).    Physical Exam   Vital Signs: Temp: 97.6  F (36.4  C) Temp src: Oral BP: 125/41 Pulse: 52   Resp: 20 SpO2: 95 % O2 Device: Nasal cannula Oxygen Delivery: 2 LPM  Weight: 211 lbs 13.79 oz    Physical Exam   Constitutional: NAD  HEENT: EOMI  Neck: Symmetric  Cardiovascular: regular without murmurs or gallops  Pulmonary/Chest: bibasilar crackles. No respiratory distress.  GI: Soft, non-tender , non-distended    Musculoskeletal:  2-3+ lower extremity edema   Skin: Skin is warm and dry  Neurological: alert, oriented to self only  Psychiatric:  euthymic      Data   Medications     - MEDICATION INSTRUCTIONS -         acetylcysteine  4 mL Inhalation BID     aspirin  81 mg Oral At Bedtime     atorvastatin  40 mg Oral At Bedtime     budesonide  0.5 mg Nebulization BID     formoterol  20 mcg Nebulization Q12H     gabapentin  100 mg Oral Daily     heparin ANTICOAGULANT  5,000 Units Subcutaneous Q12H     levothyroxine  250 mcg Oral Daily     midodrine  10 mg Oral TID w/meals     multivitamin RENAL  1 capsule Oral Daily     pantoprazole  40 mg Oral BID     vitamin B complex with vitamin C  1 tablet Oral Daily with supper     vitamin D3  50 mcg Oral Daily     "

## 2022-08-04 NOTE — UTILIZATION REVIEW
"Admission Status; Secondary Review Determination         Under the authority of the Utilization Management Committee, the utilization review process indicated a secondary review on the above patient.  The review outcome is based on review of the medical records, discussions with staff, and applying clinical experience noted on the date of the review.          (x) Observation Status Appropriate - This patient does not meet hospital inpatient criteria and is placed in observation status. If this patient's primary payer is Medicare and was admitted as an inpatient, Condition Code 44 should be used and patient status changed to \"observation\".     RATIONALE FOR DETERMINATION     Kim Anne is a 76 year old female with history of ESRD on HD M/W/F via RUE fistula, CAD and history of NSTEMI, chronic diastolic heart failure, COPD with chronic hypoxic respiratory failure (2L NC at home), and dementia.  She was recently admitted to [facility] from 6/27/2022 through 7/5/2022 with strep mitis bacteremia and acute hypoxic respiratory failure.  She discharged to [facility] for continuation of care.  She completed a 2-week course of IV ceftriaxone.  The patient wanted to be closer to family so her legal guardian took her out of the [facility]ACH to move her up [place].  Unfortunately, she did not have enough oxygen to facilitate the transfer to their destination.  The patient's guardian (her sister) took the patient to the [facility] emergency department to facilitate transfer to [facility] and make arrangements for outpatient dialysis for after their arrival.  Emergency department evaluation showed unremarkable vital signs aside from patient being on supplemental oxygen which is her baseline.  Laboratory evaluation showed sodium 135, potassium 5.6, chloride 94, BUN 36, creatinine 3.67, hemoglobin 10.1, and negative COVID-19 testing.  Patient was admitted to the hospital [place] (assistance with oxygen for transport and " outpatient dialysis upon arrival).  The night after admission the patient had some hypotension.  This is not unusual for her.  She takes midodrine and had not taken midodrine during the day prior to presentation.  She was given midodrine and blood pressure improved.  Also, it seems that blood pressure was obtained from her left upper extremity which has some stenosis making blood pressure readings less reliable.  Patient has been seen by nephrology and had dialysis.  Patient has been seen by social work regarding discharge planning.  There has been some discussion about the patient possibly entering hospice but nothing has been finalized. Patient has been stable.  Observation is appropriate for these disposition issues.    The severity of illness, intensity of service provided, expected LOS and risk for adverse outcome make the care appropriate for further observation; however, doesn't meet criteria for hospital inpatient admission. Dr Trevon Larsen was notified of this determination.    This document was produced using voice recognition software.      The information on this document is developed by the utilization review team in order for the business office to ensure compliance.  This only denotes the appropriateness of proper admission status and does not reflect the quality of care rendered.         The definitions of Inpatient Status and Observation Status used in making the determination above are those provided in the CMS Coverage Manual, Chapter 1 and Chapter 6, section 70.4.      Sincerely,    Efrain Wu MD    Utilization Review  Physician Advisor  [facility].

## 2022-08-04 NOTE — PLAN OF CARE
Observation goals  -diagnostic tests and consults completed and resulted: Met   -vital signs normal or at patient baseline:met  SNF, IHD center, and home going oxygen need to be set up: Not met, discharge plans still being sorted out.    Writer confirmed w/ patients guardian that observation papers were given this evening

## 2022-08-04 NOTE — PLAN OF CARE
Goal Outcome Evaluation:    Plan of Care Reviewed With: patient     Outcome Evaluation: Pt disoriented to time and situation. Forgetgul. Bed alarm on for safety. Flat affect. Soft BP, bradycardic 51-55 bpm. 93-96% on 2L O2 NC. Denies pain. Pt at HD most of shift, removed 2.5 L. Anuric. No BM on shift. Pt has good appetite, fed with assistance.

## 2022-08-04 NOTE — PROGRESS NOTES
Care Management Follow Up    Length of Stay (days): 3    Expected Discharge Date: 08/03/2022     Concerns to be Addressed: discharge planning     Patient plan of care discussed at interdisciplinary rounds: Yes    Anticipated Discharge Disposition: Long Term Care  Anticipated Discharge Services: PCA (Hospice)  Anticipated Discharge DME: TBD    Patient/family educated on Medicare website which has current facility and service quality ratings: Yes  Education Provided on the Discharge Plan: Yes  Patient/Family in Agreement with the Plan: Yes    Referrals Placed by CM/SW:   SW sent initial referrals for LTC:  [facility]  Contact: Kelsey Carballo  P: [phone]  F: [phone]  [email or web address]    [facility]  [address] [place]  [address]  P: [phone]  P: [phone] - Admissions  F: [phone]  8/4 Declined.  Will not be taking any LTC patients due to relocation to [place].    [facility]  [address]  P: [phone]  F: [phone]    [facility]  [address]  [address]  P: [phone]  F: [phone]    [facility] [place]  [address] [place]  [address]  P: [phone]  F: [phone]    [facility]  [address] [place]  [address]  P: [phone]  F: [phone]    [facility]  [address] [place]  [address]  P: [phone]  F: [phone]    [facility]  [address] [place]  [address]  P: [phone]  F: [phone]  8/4 Declined. Cannot meet pts needs.     [facility]  [address] [place]  [address]  P: [phone]  F: [phone]      Private pay costs discussed: Not applicable    Additional Information:  0915AM VELASQUEZ paged Dr. Larsen to inform him that [facility] pt and that SW will discuss with pts guardian about home hospice services.     0916AM VELASQUEZ  "called and spoke with pts guardian Vandana.  She reports that she is pts sister and lives up in [place], [place]. She reports that her address is [address] [place] [address].  She reports that pts family all live in [place] and would like for pt to stay in a facility or home in [place].  Vandana reports that family would not prefer pt to live in [place], [place] 4 hours away from [place]) with pts sister.  Vandana reports that she discharged pt from the [facility] because they did not allow visitors.  VELASQUEZ informed Vandana that the [facility] has declined pt and that pt was declined at all the affiliated facilities due to non-compliance of discharge plans for pt. Vandana reported that it would be permissible for VELASQUEZ to make any referrals to any LTC facilities in [place] with hospice services.  Vandana also reports that Pts daughter April ([phone]) also may be able to have pt live with her with hospice services set up.  Vandana reports that pts family live in [place] and requests that pt stay in the [place] area, if possible, due to it being challenging for them if pt passes away at a location farther away.  Vandana informed VELASQUEZ of April's phone number.    VELASQUEZ was informed yesterday by Antonia Mccain(liaison to Beth) that the [facility] also filed a Vulnerable Adult Report due to pt running out of oxygen. Antonia reports that when pt was discharged from the [facility] the discharge plan was for pt to go live with Vandana in [place], [place] but patient ran out of oxygen because pt was kept in the [facility] which was not part of the discharge plan.     @1007AM VELASQUEZ attempted to call pts daughter April([phone]) but it went to a strange \"agency\"(unclear) asking VELASQUEZ to press \"1\" for English several times and VELASQUEZ was placed on continual hold with an automated message saying that \"you will be connected to the first " "available agent.\"     @1010AM VELASQUEZ called Vandana to inform her that the phone number that she provided for pts daughter did not appear to be correct and VELASQUEZ explained the automated messages.  Vandana said, \"Oh really?\" then laughed.  Vandana reports that she will look for a different number then call back SW.     @1015AM VELASQUEZ received a call back from Vandana. She informed VELASQUEZ that April's (pts daughter) phone number is: [phone].    @1016AM VELASQUEZ called Aprils phone number ([phone]) twice and an automated message immediately says, \"call rejected.\"     VELASQUEZ received a call from VELASQUEZ Hamlin from [facility] Adult protection unit. ([phone])  VELASQUEZ was informed that she is following up on VA report made by the [facility] due to non-compliance of discharge plans. Yakelin reports that pts discharge plan was to go to a reservation facility on hospice.  That plan was not complied with and pt was taken to pts daughter's home.  Yakelin reports that pt discharging to April's(daughter) would not be a safe discharge plan for pt.  Yakelin reports that April and pts granddaughter have a variety of issues in the home that would make it unsafe for pt to stay there even with services set-up.  Yakelin reports issues with drug use, criminal behavior, and un-cleanliness as a few issues that would be a barrier for any home care or hospice agency to be able to come into the home.  Yakelin reports daughter April is not pro-active with pts care and was not participating in attending to pts medical needs. Yakelin reports that if pt is discharged to April's home that a VA report needs to be made immediately.  Yakelin reports that pts daughter does not allow any SWs in their home and will be reluctant to call 911 due to the activities taking place in the home. Yakelin reports that the safest place for pt would be in a facility.      @1046AM VELASQUEZ sent initial referrals for LTC and will also pursue hospice services when a LTC has been found.    @1503PM VELASQUEZ " received a VM from Kelsey Carballo (liaison to [facility]-[phone]). VELASQUEZ called and LVM informing Kelsey that pt will be needing a LTC with hospice services.     @1505PM VELASQUEZ received a VM from Vandana and SW called her back.  It was unclear in the VM why Vandana called the SW so SW called her back and discussed discharge planning and SW informed Vandana that we are currently looking for a long term placement in [place] for pt and SW will let her know if there is an accepting facility. Vandana acknowledged and is in agreement of plan.    @1544PM VELASQUEZ received a call from pts daughter April.  She first called SW from the phone number [phone] but reception was poor so she called SW back from a different phone number ([phone]).  April reports that she would like pt to come home to live with her and that she had a bed, commode, and oxygen if pt needs.  VELASQUEZ is unsure how reliable this information is. VELASQUEZ explained to her that we are currently working on a discharge plan to a LTC close to pts family if possible.  April reports some financial mismanagement from Vandana-VELASQUEZ unsure if this is reliable information.  April reports that she is trying to apply for guardianship of pt instead of Abbe unsure of reliable information. VELASQUEZ informed April that Vandana is pts legal guardian and that VELASQUEZ will inform Vandana when discharge plans are updated.  April acknowledged and is agreeable to discharge plans at this time.       will continue to follow for discharge planning, support, and resources.    Corazon Ibrahim, ANA, LGSW  [address]   Office: [phone]   Pager: [phone]  [email or web address]

## 2022-08-05 NOTE — PROGRESS NOTES
Care Management Follow Up       [facility]  [address] [facility]  P: [phone]  F: [phone]   8/5: CHW LVM for admissions to f/u on referral; requested they call SW back.    [facility] [place]  [address] [place]  [address]  P: [phone]  F: [phone]   8/5:CHW LVM for admissions to f/u on referral; requested they call SW back.        Declined      [facility]  [address] [place]  [address]  P: [phone]  P: [phone] - Admissions  F: [phone]  8/4 Declined.  Will not be taking any LTC patients due to relocation to [place].    [facility]  [address] [place] [address]  [address]  P: [phone]  F: [phone]   8/5:CHW spoke with admissions and they are currently at capacity and there two months out.    [facility]  [address] [place]  [address]  P: [phone]  F: [phone]   8/5: CHW spoke with admissions and they don't work with the pt insurance ([employer])     [facility]  [address] [place]  [address]  P: [phone]  F: [phone]  8/4 Declined. Cannot meet pts needs.       [facility]  [address] [place]  [address]  P: [phone]  F: [phone]  8/5: CHW spoke with admissions they do not have an appropriate bed for pt.     [facility]  [address] [place]  [address]  P: [phone]  F: [phone]  8/5:CHW spoke with admissions and they don't work with the pt insurance ([employer]ros)     [facility]  Contact: Kelsey Carballo  P: [phone]  F: [phone]  [email or web address]  8/5:CHW spoke with Kelsey and she said they are just going to pass on her and no further explanation.     Raghavendra Tejeda   Inpatient Community Health Worker  [facility] 5A & 5B

## 2022-08-05 NOTE — PROVIDER NOTIFICATION
Primary team paged to update on pt's combative behaviors. Team asked if any PRN's could be added and currently pt is refusing PIV placement. Mits are applied d/t pt pulling off O2, desats into 80's.

## 2022-08-05 NOTE — PLAN OF CARE
Goal Outcome Evaluation:    Plan of Care Reviewed With: patient     Outcome Evaluation: Decline in mental status. Oriented only to self. Bed alarm on for safety. Refused all meds, labs, vital sings, assessments, and cares. Provider aware. Pulling out O2 out repeatedly and O2 stats dropping to 80's, mitts applied for safety. Pt removed mitts and O2 repeatedly. Provider notified. 1:1 sitter ordered, in place. Provider ordered code status changed to no CPR- DNI. Anuric. No BM since 8/1. Pt refused bowel meds. Refused repositioning.

## 2022-08-05 NOTE — PLAN OF CARE
Observation goals  -diagnostic tests and consults completed and resulted:Met  -vital signs normal or at patient baseline:Met  SNF, IHD center, and home going oxygen need to be set up: Not met, [facility] hospice to meet with pt's Vandana COLLINS tomorrow to further discuss plans for hospice

## 2022-08-05 NOTE — CARE PLAN
Observation goals  -diagnostic tests and consults completed and resulted:Met  -vital signs normal or at patient baseline:Met (pt refusing vital signs at this time)  SNF, IHD center, and home going oxygen need to be set up: Not met, [facility] hospice to meet with pt's Vandana COLLINS tomorrow to further discuss plans for hospice
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
## 2022-08-05 NOTE — CARE PLAN
Observation goals  -diagnostic tests and consults completed and resulted:Met  -vital signs normal or at patient baseline:Met (although currently refusing vital sign checks)   SNF, IHD center, and home going oxygen need to be set up: Not met, [facility] hospice to meet with pt's Vandana COLLINS tomorrow to further discuss plans for hospice

## 2022-08-05 NOTE — PROGRESS NOTES
08/05/22 0900   General Information   Onset of Illness/Injury or Date of Surgery 08/01/22   Referring Physician Scarlett Mccain PA-C   Pertinent History of Current Problem Pt is a 76 year old female with history of ESRD on HD M/W/F via RUE fistula, CAD and history of NSTEMI, chronic diastolic heart failure, COPD with chronic hypoxic respiratory failure (2L NC at home), and dementia.  She was recently admitted to [facility] from 6/27/2022 through 7/5/2022 with strep mitis bacteremia and acute hypoxic respiratory failure.  She discharged to [facility] for continuation of care.  She completed a 2-week course of IV ceftriaxone.  The patient wanted to be closer to family so her legal guardian took her out of the [facility] to move her up [place].  Unfortunately, she did not have enough oxygen to facilitate the transfer to their destination.  The patient's guardian (her sister) took the patient to the [facility] emergency department to facilitate transfer to [facility] and make arrangements for outpatient dialysis for after their arrival. Clinical swallow evaluation completed per PA order.   Past History of Dysphagia None per EMR review nor per pt report   Pain Assessment   Patient Currently in Pain No   Type of Evaluation   Type of Evaluation Swallow Evaluation   Oral Motor   Oral Musculature generally intact   Structural Abnormalities none present   Mucosal Quality good   Dentition (Oral Motor)   Dentition (Oral Motor) some missing teeth   Facial Symmetry (Oral Motor)   Facial Symmetry (Oral Motor) WNL   Lip Function (Oral Motor)   Lip Range of Motion (Oral Motor) unable/difficult to assess  (Pt largely refusing to follow commands to adequately assess)   Tongue Function (Oral Motor)   Tongue ROM (Oral Motor) unable/difficult to assess   Cough/Swallow/Gag Reflex (Oral Motor)   Volitional Throat Clear/Cough (Oral Motor) WNL   Vocal Quality/Secretion Management (Oral Motor)   Vocal Quality (Oral Motor) WFL   Secretion Management  (Oral Motor) WNL   General Swallowing Observations   Respiratory Support (General Swallowing Observations) nasal cannula   Current Diet/Method of Nutritional Intake (General Swallowing Observations, NIS) regular diet;thin liquids (level 0)   Swallowing Evaluation Clinical swallow evaluation   Clinical Swallow Evaluation   Feeding Assistance set up only required   Clinical Swallow Evaluation Textures Trialed thin liquids;solid foods   Clinical Swallow Eval: Thin Liquid Texture Trial   Mode of Presentation, Thin Liquids straw;self-fed   Volume of Liquid or Food Presented x3 sips   Oral Phase of Swallow WFL   Pharyngeal Phase of Swallow intact   Diagnostic Statement Adequate oral phase with no overt s/s aspiration with limited amount assessed   Clinical Swallow Evaluation: Solid Food Texture Trial   Mode of Presentation self-fed   Volume Presented 1/2 muffin   Oral Phase WFL   Pharyngeal Phase intact   Diagnostic Statement Adequate oral phase with no overt s/s aspiration with limited amount assessed   Esophageal Phase of Swallow   Patient reports or presents with symptoms of esophageal dysphagia No   Swallowing Recommendations   Diet Consistency Recommendations regular diet;thin liquids (level 0)   Supervision Level for Intake distant supervision needed   Mode of Delivery Recommendations bolus size, small;slow rate of intake   Swallowing Maneuver Recommendations alternate food and liquid intake   Monitoring/Assistance Required (Eating/Swallowing) stop eating activities when fatigue is present   Recommended Feeding/Eating Techniques (Swallow Eval) maintain upright sitting position for eating   Medication Administration Recommendations, Swallowing (SLP) whole with thin liquid   Instrumental Assessment Recommendations instrumental evaluation not recommended at this time   General Therapy Interventions   Planned Therapy Interventions Dysphagia Treatment   Dysphagia treatment Instruction of safe swallow strategies    Clinical Impression   Criteria for Skilled Therapeutic Interventions Met (SLP Eval) Yes, treatment indicated   SLP Diagnosis Functional oropharyngeal swallowing mechanism   Risks & Benefits of therapy have been explained evaluation/treatment results reviewed;care plan/treatment goals reviewed;risks/benefits reviewed;current/potential barriers reviewed;participants voiced agreement with care plan;participants included;patient   Clinical Impression Comments SLP: Bedside swallow evaluation completed per PA orders. Pt presents with functional oropharyngeal swallowing mechanism and regular/thin liquid diet is recommended. Difficult to complete oral mechanism assessment as pt largely refusing to follow commands.  Pt was assessed with limited amounts regular solids and thin liquids as pt refused increased intake despite encouragement. Pt demonstrated functional and complete mastication, adequate bolus control, no oral residuals, was able to clear bolus with single swallow, no report of sticking sensation in throat, no change in vocal quality following PO trials, and no overt s/s aspiration noted. Recommend pt continue on current diet of regular solids and thin liquids. Pt presents with functional oropharyngeal swallowing mechanism however note that assessment was limited due to pt refusal, SLP will follow up to encourage PO intake to assess for ongoing diet tolerance.   SLP Discharge Planning   SLP Discharge Recommendation   (Defer to MD)   SLP Rationale for DC Rec Pt with functional oropharyngeal swallowing mechanism, do not anticipate ongoing speech therapy will be warranted upon discharge   SLP Brief overview of current status  Recommend pt continue on current diet of regular solids and thin liquids. Pt should be fully alert and upright with all PO. Pt presents with functional oropharyngeal swallowing mechanism however note that assessment was limited due to pt refusal, SLP will follow up to encourage PO intake to  assess for ongoing diet tolerance    Total Evaluation Time   Total Evaluation Time (Minutes) 20

## 2022-08-05 NOTE — PLAN OF CARE
Goal Outcome Evaluation:    Plan of Care Reviewed With: patient     Overall Patient Progress: improving    Outcome Evaluation: Pt to have speech evaluation tomorrow for swallowing as pt coughed on dinner this evening. [facility] CL to meet w/ pt's Vandana COLLINS on 8/5 to further discuss hospice. Safe disposition currently pending. Remains on 2 L O2, no c/o pain/discomfort.

## 2022-08-05 NOTE — PROGRESS NOTES
Tried to insert IV in left anterior forearm but unsuccessful, on 2nd attempt,patient refused, RN aware.

## 2022-08-05 NOTE — CARE PLAN
Pt repeatedly pulling off O2 NC, stats dropping to low 80's. Attempted distraction and education, pt still removing O2. Applied mitts for safety to keep O2 on. O2 SpO2 returned to 93% with 2L O2.
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
## 2022-08-05 NOTE — PLAN OF CARE
Goal Outcome Evaluation:    Plan of Care Reviewed With: patient     Overall Patient Progress: improving    Outcome Evaluation: A/Ox2. Systolic BP slightly elevated, while diastolic in the 40s, stable otherwise on 1.5 L O2. Denies pain. Turned Q2. Loss of IV, new order put in for one. Vandana COLLINS plans to meet today to discuss hospice.

## 2022-08-05 NOTE — CONSULTS
"[facility] CL received triage call from patient's POA requesting informational visit for possible community hospice. Writer called \"Vandana\" back and discussed meeting in person at the hospital on 8/5 for discussion on hospice services and eligibility. Please contact CL with any questions/updates in the meantime.     Thank you,    Trudy Mccain RN CNL  "

## 2022-08-05 NOTE — PROGRESS NOTES
"Hemodialysis Progress Note    I personally reviewed the vital signs, medications, labs, and imaging.  Subjective: I examined the patient during dialysis.  Interestingly, today she is looking better.  She has no fever in fact her blood pressure has improved. Bcx was not done and she did not receive ABx.     Objective:  BP (!) 133/38 (BP Location: Left leg)   Pulse 54   Temp 98.2  F (36.8  C) (Oral)   Resp 20   Ht 1.651 m (5' 5\")   Wt 96.1 kg (211 lb 13.8 oz)   SpO2 98%   BMI 35.26 kg/m      Intake/Output Summary (Last 24 hours) at 8/4/2022 2205  Last data filed at 8/4/2022 1900  Gross per 24 hour   Intake 50 ml   Output 2500 ml   Net -2450 ml       My key history or physical exam findings:   GA: Alert, on oxygen nasal cannula supplement    Heart: Normal S1-S2 no murmur    Lung: CTABL; no wheeze    Abdomen: Soft nontender    MSK: 1+ pitting edema    Access: Rt AVF    Problem list &Plan  # ESKD MWF via Rt AVF  but we are dialyzing her TTS in the hospital  # EDW 85 kg and admitted with 95 kg    # Hypotension    # Volume overload    We are dialyzing her today and plan to remove 2.5 L as tolerated. Next round is on Saturday  # Anemia secondary to CKD     # Iron deficiency anemia    Would continue Epogen. No iron due to bacteremia.    # Hx of S. mitits bacteremia    # Hypotension    Continue Home dose Midodrine.  Recommend  Bcx yesterday but not done yet.      Time spent 25 minutes on this date of encounter for chart review, history taking, physical exam, medical decision making and co-ordination of care on this date of encounter.   Steve Cid  Date of Service (when I saw the patient): August 4, 2022    "

## 2022-08-05 NOTE — PROGRESS NOTES
Care Management Follow Up    Length of Stay (days): 3    Expected Discharge Date: 08/08/2022     Concerns to be Addressed: discharge planning     Patient plan of care discussed at interdisciplinary rounds: Yes    Anticipated Discharge Disposition: Long Term Care/Hospice     Anticipated Discharge Services: Transportation  Anticipated Discharge DME: None    Patient/family educated on Medicare website which has current facility and service quality ratings: Yes  Education Provided on the Discharge Plan: Yes  Patient/Family in Agreement with the Plan: yes    Referrals Placed by CM/SW:  [facility]  [address]  P: [phone]  F: [phone]   8/5: CHW LVM for admissions to f/u on referral; requested they call SW back.     [facility]  [address] [place]  [address]  P: [phone]  F: [phone]   8/5:CHW LVM for admissions to f/u on referral; requested they call SW back.     Declined      [facility]  [address] [place]  [address]  P: [phone]  P: [phone] - Admissions  F: [phone]  8/4 Declined.  Will not be taking any LTC patients due to relocation to [place].     [facility]  [address]  [address]  P: [phone]  F: [phone]   8/5:CHW spoke with admissions and they are currently at capacity and there two months out.     [facility]  [address] [place]  [address]  P: [phone]  F: [phone]   8/5: CHW spoke with admissions and they don't work with the pt insurance ([facility]  [address] [place]  [address]  P: [phone]  F: [phone]  8/4 Declined. Cannot meet pts needs.      [facility]  [address] [place]  [address]  P: [phone]  F: [phone]  8/5: CHW spoke with admissions they do not have an appropriate bed for pt.      [facility]  [address] [place]  [address]  P: [phone]  F: [phone]  8/5:CHW spoke with admissions and they don't work with the pt insurance ([employer])      [facility]  Contact: Kelsey Carballo  P: [phone]  F: [phone]  [email or web address]  8/5:CHW spoke with Kelsey and she said they are just going to pass on her and no further explanation.       Private pay costs discussed: Not applicable    Additional Information:  CHW Dipesh followed up on referrals.  Anticipate needing to make more new initial referrals.     will continue to follow for discharge planning, support, and resources.    ANA Fajardo, LGSW  [address]   Office: [phone]   Pager: [phone]  [email or web address]

## 2022-08-05 NOTE — CARE PLAN
Vascular access attempted two IV insertions, pt refused at this time. Will reattempt IV insertion later with new order.

## 2022-08-06 NOTE — PLAN OF CARE
Goal Outcome Evaluation:    Observation goals  PER UNIT ROUTINE        Comments: Find safe disposition: Not Met, awaiting placement at TCU.

## 2022-08-06 NOTE — PLAN OF CARE
Goal Outcome Evaluation:    Plan of Care Reviewed With: patient     Overall Patient Progress: no change    Outcome Evaluation: Oriented only to self. Pt uncooperative with cares but less combative and restless than yesterday. Bed alarm and chair alarm on for safety. 1:1 sitter present for safety and to maintain O2 in place d/t pulling out NC. 2L O2 NC. ARAUZ. Reported mild pain in legs, administered tylenol x1 to relief. Pt refused all assessments, vital sign checks, labs, medications. Pt refused dialysis. Provider notified. Frequent rounding with offers for repositioning, food, drink, and toileting, mostly declined. Up to commode x1 and up to chair. Up to wheel chair, circled nursing station with sitter.

## 2022-08-06 NOTE — PROGRESS NOTES
"Pt actively refusing dialysis today and pleading \"help me\", \"I would rather die than get dialysis\", and \"its my right to decline\". Provider notified and bedside. Due to safey concerns and actively refusing dialysis, HD cancelled for 08/06/2022. Pt transferred to floor 5A  "

## 2022-08-06 NOTE — PROGRESS NOTES
[facility]    Medicine Progress Note - Hospitalist Service, GOLD TEAM 9    Date of Admission:  8/1/2022    Assessment & Plan            Kim Anne is a 76 year old female with a pmh of ESRD on IHD M/W/F via RUE fistula, CAD and history of NSTEMI, chronic diastolic heart failure, COPD with chronic hypoxic respiratory failure (2L NC at home), and dementia who presents with sister who is her guardian needing assistance setting up dispo.    Today:  - patient agitated unable to get hemodialysis today. Refusing labs past 2 days. At present patient should get non-invasive respiratory support if needed up to biapap/HFNC if needed but NO intubation, NO ICU transfer, NO pressors.         #GOC  - Vandana (guardian) per my discussions on 8/4 and 8/6 is trying to arrange hospice care for patient, though unclear what facility vs home will be best option per SW    8/5: confirmed with Vandana over the phone that patient's code status is DNR/DNI, though we are continuing with hemodialysis and restorative care at present pending dispo plan    8/6: called Vandana, given patient declined hemodialysis today. I told her this and asked if she would like us to stop offereing hemodialysis in the hospital, knowing that this would give Kim days to week of life. Presently Vandana's goal is to get patient closer to home / family for hospice so she wants us to continue to offer dialysis. Understands that if patient declines dialysis regularly patient could die, so we will continue to give hemodialysis if she lets us.    1. ESRD on IHD M/W/F via RUE fistula  2. Fluid overloaded  3. Hyperkalemia  4. Hyponatremia  - Nephrology consulted, hemodialysis     #Hypotension  - taken on left extremity given RUE fistula, however Provider Advisory says left subclavian stenosis present thus these may not be accurate. On exam left hand cooler than right with weaker radial pulse which supports altered  perfusion. Patient is also at risk for infection (recent bacteremia), monitor closey while blood cultures pending  *continue with home midodrine    3. Severe COPD  4. Chronic hypoxic respiratory failure  - currently on 2L NC at home and COPD is likely the cause of her hypoxia as she previously required home O2 for exertion prior to previous admission where she was discharge with continuous 2L NC after possible Strep Mitis bacteremia and possible aspiration PNA .  - patient saturating in mid 90s on 2L NC currently  - restart mucomyst, budesonide, formoterol, and  duoneb    5. Chronic Diastolic Heart failure  6. Troponin leak  7. History of CAD  - ECHO 6/28 showed an EF on 55-60% with grade 3 or advanced diastolic dysfunction with low normal RV function  - chronically elvated troponin given ESRD  - ASA 81 and statin restarted    8. Type 2 diabetes  - currently not taking any PTA hyperglycemic medication  . No blood sugar checks indicated given trend normal    9. Hyperlipidemia  - restart PTA lipitor    10. Hypothyroidism  - restart PTA synthroid --> increased from 200 to 250 mcg daily 8/2 for low T4    11. GERD  - restart PTA protonix    12. Anemia of Chronic Disease  - hgb 10.1 in ED  - hgb during previous admission stayed around mid 7s  - will transfuse if hbg <7     13. Advanced care planning  14. Dementia  - social work needed for help with SNF placement, IHD center, and home going oxygen support.  - patient's sister is the guardian    Diet:  Renal diet  DVT Prophylaxis: Heparin SQ and Pneumatic Compression Devices  Chaney Catheter: Not present  Central Lines: None  Cardiac Monitoring: None  Code Status:  Full Code         Diet: Renal Diet (dialysis)  Room Service    DVT Prophylaxis: Heparin SQ  Chaney Catheter: Not present  Central Lines: None  Cardiac Monitoring: None  Code Status: No CPR- Do NOT Intubate      Disposition Plan      Expected Discharge Date: 08/08/2022      Destination: long-term care  "facility  Discharge Comments: Medically ready for discharge. I appreciate SW note 8/4 sound like a complex scenario.. -DrB        The patient's care was discussed with the Patient. And patient's family    Trevon Larsen MD  Hospitalist Service, GOLD TEAM [address] [place] [address]  Securely message with the Vocera Web Console (learn more here)  Text page via [facility] Paging/Directory   Please see signed in provider for up to date coverage information      Clinically Significant Risk Factors Present on Admission                # Obesity: Estimated body mass index is 35.26 kg/m  as calculated from the following:    Height as of this encounter: 1.651 m (5' 5\").    Weight as of this encounter: 96.1 kg (211 lb 13.8 oz).        ______________________________________________________________________    Interval History   Asks \"what are you\" . Holding a grahm cracker to eat    4 point ROS otherwise negative within limits of patient's mentation.    Data reviewed today: I reviewed all medications, new labs and imaging results over the last 24 hours. I personally reviewed the EKG tracing showing sinus bradycardia (not junctional as computer read suggested).    Physical Exam   Vital Signs:   Temp src: Oral BP: 124/73 Pulse: 65   Resp: 20 SpO2: 95 % O2 Device: Nasal cannula Oxygen Delivery: 2 LPM  Weight: 211 lbs 13.79 oz    Physical Exam   Constitutional: sitting in chair 1:1 present NAD  HEENT: EOMI, NC in palce  Neck: Symmetric  Cardiovascular: regular without murmurs or gallops  Pulmonary/Chest: bibasilar crackles. No respiratory distress.  GI: Soft, non-tender , non-distended    Musculoskeletal:  2-3+ lower extremity edema asymmetrical  Skin: Skin is warm and dry  Neurological: alert, oriented to self only  Psychiatric:  euthymic      Data   Medications       acetylcysteine  4 mL Inhalation BID     aspirin  81 mg Oral At Bedtime     atorvastatin  40 mg Oral At Bedtime     budesonide  0.5 mg " Nebulization BID     formoterol  20 mcg Nebulization Q12H     gabapentin  100 mg Oral Daily     heparin ANTICOAGULANT  5,000 Units Subcutaneous Q12H     levothyroxine  250 mcg Oral Daily     midodrine  10 mg Oral TID w/meals     multivitamin RENAL  1 capsule Oral Daily     pantoprazole  40 mg Oral BID     sodium chloride (PF)  3 mL Intracatheter Q8H     vitamin B complex with vitamin C  1 tablet Oral Daily with supper     vitamin D3  50 mcg Oral Daily

## 2022-08-06 NOTE — PLAN OF CARE
Goal Outcome Evaluation:    Plan of Care Reviewed With: patient     Overall Patient Progress: declining    Outcome Evaluation: Pt only oriented to self.  Has been asking staff to light her cigarette all shift.  Nicotine cartridges ordered. Pt less agressive this shift after having BM following suppository.  Refused all meds, but allowed assessments.  Did not pull at O2.  Plan for possible discharge to TCU once placement found. Dialysis tomorrow.

## 2022-08-06 NOTE — PLAN OF CARE
Observation goals  PER UNIT ROUTINE        Comments: Find safe disposition: Not Met, awaiting placement at TCU

## 2022-08-06 NOTE — PROGRESS NOTES
"Nephrology Progress Note  08/06/2022       Kim Anne is a 76 yof with complex medical hx including ESRD on MWF HD, severe COPD on home O2 (still smoking), HFpEFF, CAD, DM2, HLP, who is admitted with failure to thrive and volume overload due to hypotension on dialysis.  Nephrology consulted for management of dialysis.      Interval History :   Seen at dialysis unit, pt refusing HD and yelling to stop and \"it's my right to die\". Unsafe to cannulate needles and pt actively refusing in spite of me and others attempting to encourage her.    Assessment & Recommendations:   ESRD-On HD MWF generally, had last run before admission on Friday 7/29                -unable to dialyze today, pt refusing and agitated, GOC discussions underway                -Access is R arm fistula.      Lytes:   - no labs since 8/4, pt refusing draws     Volume-EDW 85 kg     BP: fairly well controlled 120-140's     BMD-Ca 8.2, alb 3.5, phos 5.3     Anemia-Hgb 9's, up from 7's during admission in June.  Giving EPO with runs.    - continue epogen 2000 units per HD           Time spent: 30 minutes on this date of encounter for chart review, physical exam, medical decision making and co-ordination of care.            Recommendations were communicated to primary team via note    Beulah Hutchins PA-C  [phone]    Review of Systems:   Unable, pt agitated and yelling     Physical Exam:   I/O last 3 completed shifts:  In: 100 [P.O.:100]  Out: -    /73 (BP Location: Right leg)   Pulse 65   Temp 97.2  F (36.2  C) (Oral)   Resp 20   Ht 1.651 m (5' 5\")   Wt 96.1 kg (211 lb 13.8 oz)   SpO2 95%   BMI 35.26 kg/m       GENERAL APPEARANCE: agitated, confused  EYES: No scleral icterus  Pulmonary: lungs rhonchi to auscultation with equal breath sounds bilaterally, no clubbing or cyanosis  CV: Regular rhythm, normal rate, no rub   - Edema +2-3 LE  GI: soft, nontender  MS: no evidence of inflammation in joints, no muscle tenderness  : No " Chaney  SKIN: no rash, warm, dry  NEURO: agitated    Labs:   All labs reviewed by me  Electrolytes/Renal -   Recent Labs   Lab Test 08/04/22  0853 08/04/22  0659 08/03/22  1154 08/03/22  0813 08/03/22  0658 08/02/22  0759 08/02/22  0650 07/04/22  0740 07/02/22  0516 07/01/22  0556 06/27/22  1126 10/20/21  0855   * 128*  --   --  131*  --  134*   < > 133* 133*   < > 138   POTASSIUM 3.7 4.8  --   --  4.5  --  5.3   < > 4.1 4.0   < > 3.9   CHLORIDE 95* 92*  --   --  95*  --  94*   < > 97* 98   < > 101   CO2 27 23  --   --  25  --  29   < > 23 25   < > 30   BUN 19.8 30.0*  --   --  26.4*  --  40.2*   < > 25.1* 20.6   < > 37*   CR 2.56* 3.61*  --   --  3.03*  --  3.99*   < > 2.98* 2.38*   < > 3.73*   * 87 113*   < > 115*   < > 111*   < > 103* 102*   < > 158*   FRANCES 8.2* 9.0  --   --  8.6*  --  8.9   < > 8.2* 8.3*   < > 8.7   MAG  --   --   --   --   --   --   --   --  1.7 1.5*  --  1.7   PHOS  --  5.3*  --   --  4.9*  --  5.7*   < >  --   --   --   --     < > = values in this interval not displayed.       CBC -   Recent Labs   Lab Test 08/04/22  0659 08/03/22  0658 08/01/22  1515   WBC 5.3 4.5 4.8   HGB 9.9* 9.3* 10.1*    234 263       LFTs -   Recent Labs   Lab Test 08/04/22  0659 08/03/22  0658 07/04/22  0740 06/27/22  1612 06/27/22  1128   ALKPHOS  --   --  228* 312* 346*   BILITOTAL  --   --  0.3 0.5 0.4   ALT  --   --  8* 13 14   AST  --   --  21 37* 23   PROTTOTAL  --   --  6.0* 6.9 6.1*   ALBUMIN 3.5 3.4* 2.9* 3.6 3.0*       Iron Panel -   Recent Labs   Lab Test 06/28/22  0454 06/03/21  0533 06/03/21  0349 02/25/20  0955   IRON 35*  --  27* 47   IRONSAT 16  --  7* 20   *   < >  --  1,088*    < > = values in this interval not displayed.           Current Medications:    acetylcysteine  4 mL Inhalation BID     aspirin  81 mg Oral At Bedtime     atorvastatin  40 mg Oral At Bedtime     budesonide  0.5 mg Nebulization BID     formoterol  20 mcg Nebulization Q12H     gabapentin  100 mg Oral  Daily     heparin ANTICOAGULANT  5,000 Units Subcutaneous Q12H     levothyroxine  250 mcg Oral Daily     midodrine  10 mg Oral TID w/meals     multivitamin RENAL  1 capsule Oral Daily     pantoprazole  40 mg Oral BID     sodium chloride (PF)  3 mL Intracatheter Q8H     vitamin B complex with vitamin C  1 tablet Oral Daily with supper     vitamin D3  50 mcg Oral Daily

## 2022-08-06 NOTE — PLAN OF CARE
Speech Language Therapy Discharge Summary    Reason for therapy discharge:    All goals and outcomes met, no further needs identified.    Progress towards therapy goal(s). See goals on Care Plan in [facility] electronic health record for goal details.  Goals met    Therapy recommendation(s):    No further therapy is recommended.     Pt tolerating regular/thin liquid diet. Pt presents with functional oropharyngeal swallowing mechanism no ongoing speech therapy warranted. SLP will complete orders and sign off

## 2022-08-06 NOTE — PLAN OF CARE
Goal Outcome Evaluation:    Plan of Care Reviewed With: patient     Overall Patient Progress: no change      Alert to self. VSS on 2L via NC. Pulled at O2 overnight with many efforts to readjust. Pt continues to be combative and refusing assessment & cares. Nurse frequently rounded with several attempts at getting pt up to use commode & repositioning. Refused skin assessment & vitals this shift. Pt had bloody nose, refusing cares, MD came to bedside. Will be discharging to a long term care facility once placement is found. Sitter remains at bedside.

## 2022-08-07 NOTE — PROGRESS NOTES
Brief Nephrology Note:    We were unable to dialyze the patient yesterday due to extreme agitation and pt refusing to allow cannulation of AVF. Labs are stable today. We will plan to re-attempt dialysis tomorrow but only if the patient has a sitter present throughout the run.    Beulah Hutchins PA-C  [phone]

## 2022-08-07 NOTE — PLAN OF CARE
Goal Outcome Evaluation:    Plan of Care Reviewed With: patient     Overall Patient Progress: no change    Outcome Evaluation: Pt has had no acute changes.  Still A&O x 1.  Refused skin assessment today.  Did take meds and let writer do part of assessment.  1:1 sitter present to keep O2 in place.  Pt denies pain and n/v.  O2 at 2LPM via NC.  Cooperative this evening.

## 2022-08-07 NOTE — PROGRESS NOTES
[facility]    Medicine Progress Note - Hospitalist Service, GOLD TEAM 9    Date of Admission:  8/1/2022    Assessment & Plan            Kim Anne is a 76 year old female with a pmh of ESRD on IHD M/W/F via RUE fistula, CAD and history of NSTEMI, chronic diastolic heart failure, COPD with chronic hypoxic respiratory failure (2L NC at home), and dementia who presents with sister who is her guardian needing assistance setting up dispo.    Today:  - overnight hemoptysis, CT PE done emergently without clear source, resolved by early morning, held basa and prophylaxis anticoagulation. Also w/ desat, which slowly improved after coughing up bloody mucous plugs. Vandana called by cross cover and wanted full cares up to the point of DNR/DNI      #Hemoptysis 8/6  #Acute hypoxemic respiratory failure   - no clear source on CT PE, was reported that the night prior pt has a nosebleed (unwitnessed by this author) so potentially some post nasal gtt ed and caused the respiratory failure given improved w/ coughin mucous up. No crusted blood externally on nares 8/7 midday exam; unable to visualize pharynx 2/2 redundancy  *cbc stable        #GOC  - Vandana (guardian) per my discussions on 8/4 and 8/6 is trying to arrange hospice care for patient, though unclear what facility vs home will be best option per     8/5: confirmed with Vandana over the phone that patient's code status is DNR/DNI, though we are continuing with hemodialysis and restorative care at present pending dispo plan    8/6: called Vandana, given patient declined hemodialysis today. I told her this and asked if she would like us to stop offereing hemodialysis in the hospital, knowing that this would give Kim days to week of life. Presently Vandana's goal is to get patient closer to home / family for hospice so she wants us to continue to offer dialysis. Understands that if patient declines dialysis regularly  patient could die, so we will continue to give hemodialysis if she lets us.    1. ESRD on IHD M/W/F via RUE fistula  2. Fluid overloaded  3. Hyperkalemia  4. Hyponatremia  - Nephrology consulted, hemodialysis     #Hypotension  - taken on left extremity given RUE fistula, however Provider Advisory says left subclavian stenosis present thus these may not be accurate. On exam left hand cooler than right with weaker radial pulse which supports altered perfusion. CTPE 8/7 confirms occluded left subclavian artery stent, so left arm pressures will be inaccurate  *leg pressures preferred  *continue with home midodrine    3. Severe COPD  4. Chronic hypoxic respiratory failure  - currently on 2L NC at home and COPD is likely the cause of her hypoxia as she previously required home O2 for exertion prior to previous admission where she was discharge with continuous 2L NC after possible Strep Mitis bacteremia and possible aspiration PNA .  - restart mucomyst, budesonide, formoterol, and  duoneb    5. Chronic Diastolic Heart failure  7. History of CAD  - ECHO 6/28 showed an EF on 55-60% with grade 3 or advanced diastolic dysfunction with low normal RV function  - chronically elvated troponin given ESRD  - ASA 81 and statin restarted    8. Type 2 diabetes  - currently not taking any PTA hyperglycemic medication  . No blood sugar checks indicated given trend normal    9. Hyperlipidemia  - restart PTA lipitor    10. Hypothyroidism  - restart PTA synthroid --> increased from 200 to 250 mcg daily 8/2 for low T4    11. GERD  - restart PTA protonix    12. Anemia of Chronic Disease  - hgb 10.1 in ED  - hgb during previous admission stayed around mid 7s  - will transfuse if hbg <7     13. Advanced care planning  14. Dementia  - social work needed for help with SNF placement, IHD center, and home going oxygen support.  - patient's sister is the guardian    Diet:  Renal diet  DVT Prophylaxis: Heparin SQ and Pneumatic Compression  "Devices  Chaney Catheter: Not present  Central Lines: None  Cardiac Monitoring: None  Code Status:  Full Code         Diet: Renal Diet (dialysis)  Room Service    DVT Prophylaxis: Heparin SQ  Chaney Catheter: Not present  Central Lines: None  Cardiac Monitoring: None  Code Status: No CPR- Do NOT Intubate      Disposition Plan     Expected Discharge Date: 08/08/2022      Destination: long-term care facility  Discharge Comments: Medically ready for discharge. I appreciate SW note 8/4 sound like a complex scenario.. -DrB        The patient's care was discussed with the Patient. Bedside nurse PAT    Trevon Larsen MD  Hospitalist Service, [facility] 9  [facility]  Securely message with the Vocera Web Console (learn more here)  Text page via [employer] Paging/Directory   Please see signed in provider for up to date coverage information      Clinically Significant Risk Factors Present on Admission         # Acute Kidney Injury, unspecified: based on a >150% or 0.3 mg/dL increase in creatinine on admission compared to past 90 day average, will monitor renal function        # Obesity: Estimated body mass index is 35.26 kg/m  as calculated from the following:    Height as of this encounter: 1.651 m (5' 5\").    Weight as of this encounter: 96.1 kg (211 lb 13.8 oz).        ______________________________________________________________________    Interval History   Sleeping though wakes a bit to look at author then dozes off.    4 point ROS otherwise negative within limits of patient's mentation.    Data reviewed today: I reviewed all medications, new labs and imaging results over the last 24 hours. I personally reviewed the EKG tracing showing sinus bradycardia (not junctional as computer read suggested).    Physical Exam   Vital Signs: Temp: 97.5  F (36.4  C) Temp src: Oral BP: 110/43 Pulse: 58   Resp: 16 SpO2: 99 % O2 Device: Nasal cannula Oxygen Delivery: 2 LPM  Weight: 211 lbs 13.79 " oz    Physical Exam   Constitutional: sliping in bed; 1:1 present  HEENT: EOMI, NC in palce  Neck: Symmetric  Cardiovascular: regular without murmurs or gallops  Pulmonary/Chest: bibasilar crackles. No respiratory distress.  GI: Soft, non-tender , non-distended    Musculoskeletal:  2+ lower extremity edema asymmetrical  Skin: Skin is warm and dry  Neurological: drowsy, disoriented  Psychiatric:  euthymic      Data   Medications       acetylcysteine  4 mL Inhalation BID     [Held by provider] aspirin  81 mg Oral At Bedtime     atorvastatin  40 mg Oral At Bedtime     budesonide  0.5 mg Nebulization BID     formoterol  20 mcg Nebulization Q12H     gabapentin  100 mg Oral Daily     [Held by provider] heparin ANTICOAGULANT  5,000 Units Subcutaneous Q12H     levothyroxine  250 mcg Oral Daily     midodrine  10 mg Oral TID w/meals     multivitamin RENAL  1 capsule Oral Daily     pantoprazole  40 mg Oral BID     sodium chloride (PF)  3 mL Intracatheter Q8H     vitamin B complex with vitamin C  1 tablet Oral Daily with supper     vitamin D3  50 mcg Oral Daily

## 2022-08-07 NOTE — UTILIZATION REVIEW
"  [facility] Utilization Review  Admission Status; Secondary Review Determination     Admission Date: 8/1/2022  2:44 PM      Under the authority of the Utilization Management Committee, the utilization review process indicated a secondary review on the above patient.  The review outcome is based on review of the medical records, discussions with staff, and applying clinical experience noted on the date of the review.        (X) Observation Status Appropriate - This patient does not meet hospital inpatient criteria and is placed in observation status. If this patient's primary payer is Medicare and was admitted as an inpatient, Condition Code 44 should be used and patient status changed to \"observation\".   () Observation Status concurrent Review           RATIONALE FOR DETERMINATION   76-year-old female with history of ESRD on hemodialysis, coronary artery disease, chronic diastolic heart failure, COPD, chronic hypoxic respiratory failure, dementia admitted with assistance setting up disposition.  Patient was changed to observation status for placement on 8/4 but developed hemoptysis, CT PE shows no clear source, resolved now, patient also developed desaturations which improved after coughing up bloody mucous plugs.  Patient does have chronic hypoxic respiratory failure and required 2 L oxygen via nasal cannula, acute event overnight which has resolved, now is medically stable for discharge, waiting for placement, does not meet criteria for inpatient stay, recommend continue observation status      The severity of illness, intensity of service provided, expected LOS make the care appropriate for observation status at this time.        The information on this document is developed by the utilization review team in order for the business office to ensure compliance.  This only denotes the appropriateness of proper admission status and does not reflect the quality of care rendered.         The definitions of " Inpatient Status and Observation Status used in making the determination above are those provided in the CMS Coverage Manual, Chapter 1 and Chapter 6, section 70.4.      Sincerely,       Austin Rhodes MD  Physician Advisor  Utilization Review-[employer]    Phone: [phone]

## 2022-08-07 NOTE — PROGRESS NOTES
"Brief Hospitalist Cross Cover Note    Contacted by RN as patient noted to be coughing up blood this evening. Used oral suctioning with ~50 ml's of blood collected. Evaluated patient in room, appears to be resting in bed without evidence of active bleeding. Does not respond to questions but able to open eyes to voice and follow simple commands.    Exam:  Temp: 97.7  F (36.5  C) Temp src: Oral BP: 104/44 Pulse: 53   Resp: 14 SpO2: 99 % O2 Device: Nasal cannula Oxygen Delivery: 2 LPM  General: Somewhat lethargic appearing elderly female laying in bed.  HEENT: Dried blood present along upper and lower lips. Examination of oral cavity reveals poor dentition, small amount of bright red blood without evidence of active bleeding. No apparent lesions or abrasions.  Resp: Auditory crackles throughout upper airway. Diminished breath sounds in lower lobe.  CV: Mild bradycardia, regular rhythm.    Plan:  - Hold aspirin, subcutaneous heparin  - Vitals q15 minutes, continuous pulse ox  - Stat CBC, CXR  - Will plan to obtain CT PE abd/pelvis although patient requires pre-medication due to contrast allergy; as patient currently HD stable, will plan to follow \"urgent\" protocol to allow 6 hours for premedication. If patient decompensating, would proceed with STAT imaging    Contacted patient's guardian, Vandana, with updates. She confirms patient is DNR/DNI but would like all measures to be performed up to this point.    Thea Elaine PA-C  Hospitalist Service  [phone]    Addendum:  Difficulty obtaining IV access. Discussed with radiology - will administer first dose of methylprednisolone orally per 12 hour protocol. If able to obtain IV, will then administer IV dose 4 hours after oral dose and IV benadryl 1 hour prior to CT.  "

## 2022-08-07 NOTE — PLAN OF CARE
"Goal Outcome Evaluation:    Plan of Care Reviewed With: patient     Overall Patient Progress: improving    Outcome Evaluation: Mental status significantly improved. Disoriented to situation and place, when asked what year it was, she said \"2022, of course!\" Cooperative with all cares, medications, assessments. Bedside attendant present and bed alarm on for safety. All VSS on 2L O2 NC. Administered PRN senna and suppository for constipation, 1 BM on shift. Plan is to trial removing bedside attendant as pt no longer pulling at lines, remaining in bed.       "

## 2022-08-08 NOTE — PROGRESS NOTES
[facility]    Medicine Progress Note - Hospitalist Service, GOLD TEAM 9    Date of Admission:  8/1/2022    Assessment & Plan            Kim Anne is a 76 year old female with a pmh of ESRD on IHD M/W/F via RUE fistula, CAD and history of NSTEMI, chronic diastolic heart failure, COPD with chronic hypoxic respiratory failure (2L NC at home), and dementia who presents with sister who is her guardian needing assistance setting up dispo.      #Hemoptysis 8/6  #Acute hypoxemic respiratory failure   - no clear source on CT PE, was reported that the night prior pt has a nosebleed (unwitnessed by this author) so potentially some post nasal gtt ed and caused the respiratory failure given improved w/ coughin mucous up. No crusted blood externally on nares 8/7 midday exam; unable to visualize pharynx 2/2 redundancy  *cbc stable        #GOC  - Vandana (guardian) per my discussions on 8/4 and 8/6 is trying to arrange hospice care for patient, though unclear what facility vs home will be best option per     8/5: confirmed with Vandana over the phone that patient's code status is DNR/DNI, though we are continuing with hemodialysis and restorative care at present pending dispo plan    8/6: called Vandana, given patient declined hemodialysis today. I told her this and asked if she would like us to stop offereing hemodialysis in the hospital, knowing that this would give Kim days to week of life. Presently Vandana's goal is to get patient closer to home / family for hospice so she wants us to continue to offer dialysis. Understands that if patient declines dialysis regularly patient could die, so we will continue to give hemodialysis if she lets us.    1. ESRD on IHD M/W/F via RUE fistula  2. Fluid overloaded  3. Hyperkalemia  4. Hyponatremia  - Nephrology consulted, hemodialysis     #Hypotension  - taken on left extremity given RUE fistula, however Provider Advisory says  left subclavian stenosis present thus these may not be accurate. On exam left hand cooler than right with weaker radial pulse which supports altered perfusion. CTPE 8/7 confirms occluded left subclavian artery stent, so left arm pressures will be inaccurate  *leg pressures preferred  *continue with home midodrine    3. Severe COPD  4. Chronic hypoxic respiratory failure  - currently on 2L NC at home and COPD is likely the cause of her hypoxia as she previously required home O2 for exertion prior to previous admission where she was discharge with continuous 2L NC after possible Strep Mitis bacteremia and possible aspiration PNA .  - restart mucomyst, budesonide, formoterol, and  duoneb    5. Chronic Diastolic Heart failure  7. History of CAD  - ECHO 6/28 showed an EF on 55-60% with grade 3 or advanced diastolic dysfunction with low normal RV function  - chronically elvated troponin given ESRD  - ASA 81 and statin restarted    8. Type 2 diabetes  - currently not taking any PTA hyperglycemic medication  . No blood sugar checks indicated given trend normal    9. Hyperlipidemia  - restart PTA lipitor    10. Hypothyroidism  - restart PTA synthroid --> increased from 200 to 250 mcg daily 8/2 for low T4    11. GERD  - restart PTA protonix    12. Anemia of Chronic Disease  - hgb 10.1 in ED  - hgb during previous admission stayed around mid 7s  - will transfuse if hbg <7     13. Advanced care planning  14. Dementia  - social work needed for help with SNF placement, IHD center, and home going oxygen support.  - patient's sister is the guardian    Diet:  Renal diet  DVT Prophylaxis: Heparin SQ and Pneumatic Compression Devices  Chaney Catheter: Not present  Central Lines: None  Cardiac Monitoring: None  Code Status:  Full Code         Diet: Renal Diet (dialysis)  Room Service    DVT Prophylaxis: Heparin SQ  Chaney Catheter: Not present  Central Lines: None  Cardiac Monitoring: None  Code Status: No CPR- Do NOT Intubate   "    Disposition Plan      Expected Discharge Date: 08/11/2022,  9:00 AM    Destination: long-term care facility  Discharge Comments: Medically ready for discharge to hospice facilty        The patient's care was discussed with the Patient. Bedside nurse SWETA Larsen MD  Hospitalist Service, GOLD TEAM 9  [facility]  Securely message with the Vocera Web Console (learn more here)  Text page via [facility] Paging/Directory   Please see signed in provider for up to date coverage information      Clinically Significant Risk Factors Present on Admission         # Acute Kidney Injury, unspecified: based on a >150% or 0.3 mg/dL increase in creatinine on admission compared to past 90 day average, will monitor renal function        # Obesity: Estimated body mass index is 35.26 kg/m  as calculated from the following:    Height as of this encounter: 1.651 m (5' 5\").    Weight as of this encounter: 96.1 kg (211 lb 13.8 oz).        ______________________________________________________________________    Interval History   No complaints elicited.    4 point ROS otherwise negative within limits of patient's mentation.    Data reviewed today: I reviewed all medications, new labs and imaging results over the last 24 hours. I personally reviewed the EKG tracing showing sinus bradycardia (not junctional as computer read suggested).    Physical Exam   Vital Signs: Temp: 97.9  F (36.6  C) Temp src: Oral BP: (!) 118/35 Pulse: 72   Resp: 16 SpO2: 96 % O2 Device: Nasal cannula Oxygen Delivery: 1.5 LPM  Weight: 211 lbs 13.79 oz    Physical Exam   Constitutional: resting in bed  HEENT: EOMI, NC in palce  Neck: Symmetric  Cardiovascular: regular without murmurs or gallops  Pulmonary/Chest: bibasilar crackles. No respiratory distress.  GI: Soft, non-tender , non-distended    Musculoskeletal:  2+ lower extremity edema asymmetrical  Skin: Skin is warm and dry  Neurological: drowsy, " disoriented  Psychiatric:  euthymic      Data   Medications       acetylcysteine  4 mL Inhalation BID     [Held by provider] aspirin  81 mg Oral At Bedtime     atorvastatin  40 mg Oral At Bedtime     budesonide  0.5 mg Nebulization BID     formoterol  20 mcg Nebulization Q12H     gabapentin  100 mg Oral Daily     [Held by provider] heparin ANTICOAGULANT  5,000 Units Subcutaneous Q12H     levothyroxine  250 mcg Oral Daily     midodrine  10 mg Oral TID w/meals     multivitamin RENAL  1 capsule Oral Daily     pantoprazole  40 mg Oral BID     sodium chloride (PF)  3 mL Intracatheter Q8H     vitamin B complex with vitamin C  1 tablet Oral Daily with supper     vitamin D3  50 mcg Oral Daily

## 2022-08-08 NOTE — PROGRESS NOTES
"Nephrology Progress Note  08/08/2022       Kim Anne is a 76 yof with complex medical hx including ESRD on MWF HD, severe COPD on home O2 (still smoking), HFpEFF, CAD, DM2, HLP, who is admitted with failure to thrive and volume overload due to hypotension on dialysis.  Nephrology consulted for management of dialysis.          Assessment & Recommendations:   ESRD-On HD MWF generally, had last run before admission on Friday 7/29                -HD per MWF schedule                -Access is R arm fistula.          Volume-EDW 85 kg, volume up with 1-2+ edema     BP: fairly well controlled 120-140's     BMD-Ca 8's, alb 3's, phos 5.9     Anemia-Hgb 9's, up from 7's during admission in June.  Giving EPO with runs.    - continue epogen 2000 units per HD        Recommendations were communicated to primary team via note    Beulah Hutchins PA-C  P [phone]    Interval History :   Very calm today, slept through most of run. 2-2.5 kg UF goal, stable blood pressures. No complaints, no CP, SOB, n/v, chills    Review of Systems:   4 point ROS neg other than as noted above    Physical Exam:   I/O last 3 completed shifts:  In: 460 [P.O.:460]  Out: -    /51   Pulse 58   Temp 97.9  F (36.6  C) (Oral)   Resp 16   Ht 1.651 m (5' 5\")   Wt 96.1 kg (211 lb 13.8 oz)   SpO2 97%   BMI 35.26 kg/m       GENERAL APPEARANCE: calm, NAD  EYES: No scleral icterus  Pulmonary: lungs rhonchi to auscultation with equal breath sounds bilaterally, no clubbing or cyanosis  CV: Regular rhythm, normal rate, no rub   - Edema +2-3 LE  GI: soft, nontender  MS: no evidence of inflammation in joints, no muscle tenderness  : No Chaney  SKIN: no rash, warm, dry  NEURO: calm    Labs:   All labs reviewed by me  Electrolytes/Renal -   Recent Labs   Lab Test 08/08/22  0635 08/07/22  0648 08/04/22  0853 08/04/22  0659 07/04/22  0740 07/02/22  0516 07/01/22  0556 06/27/22  1126 10/20/21  0855   * 127* 131* 128*   < > 133* 133*   < > 138 "   POTASSIUM 4.9 5.2 3.7 4.8   < > 4.1 4.0   < > 3.9   CHLORIDE 92* 92* 95* 92*   < > 97* 98   < > 101   CO2 25 22 27 23   < > 23 25   < > 30   BUN 36.3* 31.5* 19.8 30.0*   < > 25.1* 20.6   < > 37*   CR 4.54* 4.00* 2.56* 3.61*   < > 2.98* 2.38*   < > 3.73*   * 172* 123* 87   < > 103* 102*   < > 158*   FRANCES 8.8 8.9 8.2* 9.0   < > 8.2* 8.3*   < > 8.7   MAG  --   --   --   --   --  1.7 1.5*  --  1.7   PHOS 5.9* 5.3*  --  5.3*   < >  --   --   --   --     < > = values in this interval not displayed.       CBC -   Recent Labs   Lab Test 08/08/22  0635 08/07/22  0648 08/06/22  2035   WBC 7.3 4.2 4.6   HGB 9.4* 9.9* 9.7*    223 214       LFTs -   Recent Labs   Lab Test 08/08/22  0635 08/07/22  0648 08/04/22  0659 08/03/22  0658 07/04/22  0740 06/27/22  1612 06/27/22  1128   ALKPHOS  --   --   --   --  228* 312* 346*   BILITOTAL  --   --   --   --  0.3 0.5 0.4   ALT  --   --   --   --  8* 13 14   AST  --   --   --   --  21 37* 23   PROTTOTAL  --   --   --   --  6.0* 6.9 6.1*   ALBUMIN 3.6 3.5 3.5   < > 2.9* 3.6 3.0*    < > = values in this interval not displayed.       Iron Panel -   Recent Labs   Lab Test 06/28/22  0454 06/03/21  0533 06/03/21  0349 02/25/20  0955   IRON 35*  --  27* 47   IRONSAT 16  --  7* 20   *   < >  --  1,088*    < > = values in this interval not displayed.           Current Medications:    acetylcysteine  4 mL Inhalation BID     [Held by provider] aspirin  81 mg Oral At Bedtime     atorvastatin  40 mg Oral At Bedtime     budesonide  0.5 mg Nebulization BID     formoterol  20 mcg Nebulization Q12H     gabapentin  100 mg Oral Daily     [Held by provider] heparin ANTICOAGULANT  5,000 Units Subcutaneous Q12H     levothyroxine  250 mcg Oral Daily     midodrine  10 mg Oral TID w/meals     multivitamin RENAL  1 capsule Oral Daily     pantoprazole  40 mg Oral BID     sodium chloride (PF)  3 mL Intracatheter Q8H     vitamin B complex with vitamin C  1 tablet Oral Daily with supper      vitamin D3  50 mcg Oral Daily       - MEDICATION INSTRUCTIONS -

## 2022-08-08 NOTE — PLAN OF CARE
Goal Outcome Evaluation:           Patient helped with eating.  No yelling out this annette. Refused dialysis yesterday and will be put on schedule for tomorrow.

## 2022-08-08 NOTE — PLAN OF CARE
Goal Outcome Evaluation:    Plan of Care Reviewed With: patient     Overall Patient Progress: no change    A/Ox2, disoriented to situation & place. VSS ex bradycardic on 1L via NC. Note* CTPE confirms occluded left subclavian artery stent, so left arm pressures will be inaccurate, leg cuff preferred. Continues to have poor PO intake, nurse encouraging fluids & bites of food as tolerated. Cooperative with all cares & meds. Pt has been sleeping between cares most of shift.

## 2022-08-08 NOTE — PROGRESS NOTES
Care Management Follow Up    [facility]  [address]  P: [phone]  F: [phone]   8/8: CHW spoke with admissions they haven't reviwed pt yet. Will call SW once they do.      [facility] [place]  [address] [place]  [address]  P: [phone]  F: [phone]   8/8:CHW LVM for admissions to f/u on referral; requested they call SW back.     Declined      [facility]  [address] [place]  [address]  P: [phone]  P: [phone] - Admissions  F: [phone]  8/4 Declined.  Will not be taking any LTC patients due to relocation to [place].     [facility]  [address] [place] [address]  [address]  P: [phone]  F: [phone]   8/5:CHW spoke with admissions and they are currently at capacity and there two months out.     [facility]  [address] [place]  [address]  P: [phone]  F: [phone]   8/5: CHW spoke with admissions and they don't work with the pt insurance ([employer])      [facility]  [address] [place]  [address]  P: [phone]  F: [phone]  8/4 Declined. Cannot meet pts needs.      [facility]  [address] [place]  [address]  P: [phone]  F: [phone]  8/5: CHW spoke with admissions they do not have an appropriate bed for pt.      [facility]  [address] [place]  [address]  P: [phone]  F: [phone]  8/5:CHW spoke with admissions and they don't work with the pt insurance ([employer])      [facility]  Contact: Kelsey Carballo  P: [phone]  F: [phone]  [email or web address]  8/5:CHW spoke with Kelsey and she said they are just going to pass on her and no further explanation.     Raghavendra Tejeda   Inpatient Community Health Worker  [facility] 5A & 5B

## 2022-08-08 NOTE — PLAN OF CARE
Goal Outcome Evaluation:    Plan of Care Reviewed With: patient     Overall Patient Progress: no change    Outcome Evaluation: Cooperative with cares and assessments today.  Has dialysis this afternoon.  Continues on 2L via NC.  Denies pain and n/v.  Seems to be more tired today than usual.

## 2022-08-08 NOTE — PROGRESS NOTES
HEMODIALYSIS TREATMENT NOTE    Date: 8/8/2022  Time: 2:53 PM    Data:  Pre Wt:96.1 kg     Desired Wt: 93.6 kg   Post Wt: 93.6 kg   Weight change: 2.5 kg  Ultrafiltration - Post Run Net Total Removed (mL): 2500 mL  Vascular Access Status: Fistula  patent  Dialyzer Rinse: Light, Streaked  Total Blood Volume Processed: 60.72 L   Total Dialysis (Treatment) Time: 3.5   Dialysate Bath: K 2, Ca 2.5  Heparin: None    Lab:   No    Interventions:Assessment:  Pt had 3.5 hrs of HD via Left upper AVF. Vitally stable. Lethargic and slept the entier tx. Calm and cooperative. 2.5 L fluid removed without issue. AVF cannulated with 15g needles. Positive for thrill and bruit. 400-450 BFR achieved throughout. Hemostasis within 5 min. Handoff report given to NICOLE Denton.     Plan:    Next HD per renal

## 2022-08-09 NOTE — PROGRESS NOTES
CLINICAL NUTRITION SERVICES - BRIEF NOTE    Patient within LOS time-frame for nutrition assessment.    A full Nutrition Assessment will be deferred at this time as patient is currently observation status.      Pt can be referred to outpatient RD by primary care provider after discharge as appropriate.      Should patient's status change to Inpatient, we will be available for a full Nutrition Assessment with a consult.     Hillary Roy RD, , LD  Weekday Pager: [phone]  Weekday Units covered: 7C (all beds) and 5A (beds 5201 through 5211-2)  Weekend/Holiday RD Pager: [phone]

## 2022-08-09 NOTE — PLAN OF CARE
Goal Outcome Evaluation:      Plan of Care Reviewed With: Patient     Overall Patient Progress: No Change     Outcome Evaluation: Pt is A & O x1 (self). Pt denies pain. Pt had dialysis earlier today. Lower pelvis wound cleaned and dressing changed. Pt reports nausea - gave PRN PO Zofran. Renal diet - total feed. Bed alarm on. Does not use call light, frequent rounding.        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      History of Present Illness:     Ms. Vivian Saul is a 60 y.o. 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 Histologic examination of the biopsy specimen demonstrates multiloculated cystic tissue lined by a single layer of benign appearing mesothelial cells compatible with multiloculated/multicystic mesothelioma. These lesions can often be found in patients with prior intraabdominal surgeries or interventions and are mostly benign/indolent in behavior with possibility for recurrence and rare malignant transformation.)      6/30/21 visit with medical oncology     Today:  Feels well   Has chronic abdominal bloating   No pain   Otherwise doing well       ----------------------------------------------------------------------------------------------------------------------     Allergies:  Allergies   Allergen Reactions   • Prozac [Fluoxetine]      Panic attack   • Gabapentin Diarrhea and Nausea And Vomiting   • Nortriptyline Other (see comments)     Other reaction(s): Disorientated (finding)   • Sertraline Other (see comments)        Medications:  Current Outpatient Medications on File Prior to Visit   Medication Sig Dispense Refill   • cholecalciferol, vitamin D3, 25 mcg (1,000 unit) tablet TAKE ONE TABLET BY MOUTH DAILY FOR VITAMIN-D SUPPLEMENT FOR MAINTENANCE      • ibuprofen (ADVIL,MOTRIN) 600 mg tablet      • buPROPion (WELLBUTRIN) 75 mg tablet Take by mouth 2 (two) times a day     • ondansetron ODT (ZOFRAN-ODT) 4 mg disintegrating tablet Take 4 mg by mouth every 8 (eight) hours as needed     • Lactobacillus acidophilus (PROBIOTIC ORAL) Take by mouth     • psyllium seed, with dextrose, (FIBER ORAL) Take by mouth     • flash glucose scanning reader ([employer] 10 Day Theresa) misc      • ALPRAZolam (NIRAVAM) 0.5 mg disintegrating tablet TAKE ONE TABLET BY MOUTH DAILY AS NEEDED FOR PANIC ATTACK     • cyclobenzaprine (FLEXERIL) 10 mg tablet Take 10 mg by mouth 3 (three) times a day as needed       • desvenlafaxine (PRISTIQ) 50 mg 24 hr tablet Take 50 mg by mouth daily       • fluconazole (DIFLUCAN) 100 mg tablet Take 200 mg by mouth as needed       • glipiZIDE (GLUCOTROL) 5 mg tablet Take 5 mg by mouth daily 30 minutes prior to pm meal      • hydrOXYzine (VISTARIL) 25 mg capsule Take 25 mg by mouth nightly as needed       • meclizine (ANTIVERT) 25 mg tablet Take 25 mg by mouth daily as needed       • fluoride, sodium, 1.1 % cream APPLY SMALL AMOUNT BY MOUTH AS DIRECTED (APPLY SMALL AMOUNT TO TOOTHBRUSH IN PLACE OF REGULAR TOOTHPASTE, BRUSH  TEETH FOR 2 MINUTES IN THE MORNING AND EVENING TO AID IN CARIES CONTROL  AND SENSITIVITY.)     • rOPINIRole (REQUIP) 2 mg tablet Take 2 mg by mouth 3 (three) times a day       • pregabalin (LYRICA) 25 mg capsule Take by mouth 2 (two) times a day 50 mg in am and 100 mg in pm  210    • omeprazole (PriLOSEC) 20 mg capsule Take 20 mg by mouth as needed   90    • ergocalciferol (VITAMIN D2) 50,000 unit capsule Take 50,000 Units by mouth daily 1000  13    • cyproheptadine (PERIACTIN) 4 mg tablet Take 4 mg by mouth nightly       • levothyroxine (SYNTHROID) 112 mcg tablet Take 200 mcg by mouth daily   180      No current facility-administered medications on file prior to visit.        Past history:  Past Medical History:   Diagnosis Date   •  Benign multicystic mesothelioma 2021   • Chronic post-traumatic stress disorder (PTSD) 2021   • Dental disease    • Derangement of medial meniscus 2019    Note: Unchanged   • Diabetes mellitus (CMS/HCC) (HCC)    • Essential hypertension 2021   • Fibromyalgia    • Gastrointestinal disease     liver fibrousus   • Hepatic cirrhosis (CMS/HCC) (HCC) 2021   • Hepatitis C virus infection 2021    May 08, 2007 Entered By: ANTHONY CORTEZ Comment: Treated 2011 Entered By: JASS PERALES Comment: bx done 11/10-much scar tissue present   • Hyperlipidemia 2021   • Hypoparathyroidism (CMS/HCC) (HCC) 2021   • Hypothyroidism 2021 Entered By: CAREY BURNETT Comment: Goal TSH 2.0 or less per Dr Blake, endocrinologist   • Knee joint replaced by other means 10/23/2019    Note: Unchanged   • Menopausal osteoporosis 2021   • Minimal cognitive impairment 2021 Entered By: LEXY JACOBS Comment: [employer] , FAST 2-3, CPT 5.5/5.6, passed driving screen   • Nonalcoholic steatohepatitis 2021   • Obstructive sleep apnea syndrome 2021   • Restless legs syndrome 2021   • Tobacco dependence in remission 2021   • Type 2 diabetes mellitus without complication, without long-term current use of insulin (CMS/HCC) (HCC)    • Vitamin D deficiency 2021   • Wears dentures      Past Surgical History:   Procedure Laterality Date   •  SECTION     • CHOLECYSTECTOMY     • ESOPHAGOGASTRODUODENOSCOPY N/A 01/15/2021    Procedure: ESOPHAGOGASTRODUODENOSCOPY with biopsies;  Surgeon: David Snow DO;  Location: [facility];  Service: Endoscopy;  Laterality: N/A;   • HYSTERECTOMY     • JOINT REPLACEMENT Left 10/2019    knee   • LAPAROSCOPIC GASTRIC BANDING      subsequently removed      Social History     Socioeconomic History   • Marital status:      Spouse name: Not on file   • Number of children: 1    • Years of education: Not on file   • Highest education level: Not on file   Occupational History   • Occupation: retired    Tobacco Use   • Smoking status: Former Smoker   • Smokeless tobacco: Never Used   Vaping Use   • Vaping Use: Never used   Substance and Sexual Activity   • Alcohol use: Never   • Drug use: Never   • Sexual activity: Defer   Other Topics Concern   • Not on file   Social History Narrative   • Not on file     Social Determinants of Health     Financial Resource Strain:    • Difficulty of Paying Living Expenses:    Food Insecurity:    • Worried About Running Out of Food in the Last Year:    • Ran Out of Food in the Last Year:    Transportation Needs:    • Lack of Transportation (Medical):    • Lack of Transportation (Non-Medical):    Physical Activity:    • Days of Exercise per Week:    • Minutes of Exercise per Session:    Stress:    • Feeling of Stress :    Social Connections:    • Frequency of Communication with Friends and Family:    • Frequency of Social Gatherings with Friends and Family:    • Attends Faith Services:    • Active Member of Clubs or Organizations:    • Attends Club or Organization Meetings:    • Marital Status:    Intimate Partner Violence:    • Fear of Current or Ex-Partner:    • Emotionally Abused:    • Physically Abused:    • Sexually Abused:      Family History   Problem Relation Age of Onset   • Suicidality Father    • Heart attack Brother    • No Known Problems Brother    • Obesity Son         ----------------------------------------------------------------------------------------------------------------------     Review of systems:  Review of Systems   All other systems reviewed and are negative.       Physical exam:     ECO      Vitals:   Vitals:    21 1531   BP: 142/88   Pulse: 108   Temp: 37.2 °C (99 °F)   SpO2: 93%    Body mass index is 42.73 kg/m².     Physical Exam  Constitutional:       Appearance: She is well-developed.   HENT:      Head:  Normocephalic.   Eyes:      Pupils: Pupils are equal, round, and reactive to light.   Cardiovascular:      Rate and Rhythm: Normal rate.   Pulmonary:      Effort: Pulmonary effort is normal.   Abdominal:      Palpations: Abdomen is soft.   Musculoskeletal:         General: Normal range of motion.      Cervical back: Normal range of motion.   Neurological:      Mental Status: She is alert and oriented to person, place, and time.         ----------------------------------------------------------------------------------------------------------------------  Lab results:     Admission on 05/19/2021, Discharged on 05/19/2021   Component Date Value Ref Range Status   • Sodium 05/19/2021 137  135 - 145 mmol/L Final   • Potassium 05/19/2021 4.2  3.5 - 5.1 mmol/L Final   • Chloride 05/19/2021 102  98 - 107 mmol/L Final   • CO2 05/19/2021 25  21 - 32 mmol/L Final   • BUN 05/19/2021 18  7 - 25 mg/dL Final   • Creatinine 05/19/2021 1.04  0.60 - 1.10 mg/dL Final   • Glucose 05/19/2021 113* 70 - 105 mg/dL Final   • Calcium 05/19/2021 9.5  8.6 - 10.3 mg/dL Final   • Anion Gap 05/19/2021 10  3 - 11 mmol/L Final   • eGFR 05/19/2021 58* >60 mL/min/1.73m*2 Final   • Culture 05/19/2021 No growth at 72 hours   Final   • Gram Stain Result 05/19/2021 Many WBC per oil immersion field   Final   • Gram Stain Result 05/19/2021 No organisms seen   Final   • Culture 05/19/2021 No anaerobic organism isolated   Final          Imaging:     Reviewed      Pathology:     reviewed      ----------------------------------------------------------------------------------------------------------------------     Assessment and Plan:     Ms. Vivian Saul is a 60 y.o. 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  Which is not malignant mesothelioma.     Discussed that this is likely a benign reactive issue, especially in the setting of repeated abdominal surgeries, discussed the being  course of it and that it does not affect survival, discussed that this is not a medical oncology issues rather  than surgical oncology.     Plan:  - referral to [facility] surgical oncology for treatment options   - follow up as needed              Chong Elliott MD     On this date of service  I spent a total time of 57 minutes

## 2022-08-09 NOTE — PROGRESS NOTES
Care Management Follow Up    Length of Stay (days): 3    Expected Discharge Date: 08/12/2022     Concerns to be Addressed: discharge planning     Patient plan of care discussed at interdisciplinary rounds: Yes    Anticipated Discharge Disposition: Long Term Care-memory are  Anticipated Discharge Services: Transportation  Anticipated Discharge DME: TBD    Patient/family educated on Medicare website which has current facility and service quality ratings: Yes  Education Provided on the Discharge Plan:  Yes  Patient/Family in Agreement with the Plan:  Yes    Referrals Placed by CM/SW:   SW sent new initial referrals to:    [facility]  [address] [place]  [address]  P: [phone]  F: [phone]    [facility]  [address] [place]  [address]  P: [phone]  F: [phone]    [facility]  [address] [place]  [address]  P: [phone]  F: [phone]    [facility]  [address] [place]  [address]  P: [phone]  F: [phone]    CHW Dipesh followed upon Referrals:     [facility]  [address]  P: [phone]  F: [phone]   8/9: CHW spoke with admissions they haven't reviwed pt yet. Will call  once they do.      Declined:  [facility]  [address] [place]  [address]  P: [phone]  F: [phone]  8/9: Declined. No appropriate bed.     [facility] [place]  [address] [place]  [address]  P: [phone]  P: [phone] - Admissions  F: [phone]  8/4 Declined.  Will not be taking any LTC patients due to relocation to [place].     [facility]  [address] [place] [address]  [address]  P: [phone]  F: [phone]   8/5:CHW spoke with admissions and they are currently at capacity and there two months out.     [facility]  [address] [place]  [address]  P:  [phone]  F: [phone]   8/5: CHW spoke with admissions and they don't work with the pt insurance ([employer])      [facility]  [address] [place]  [address]  P: [phone]  F: [phone]  8/4 Declined. Cannot meet pts needs.      [facility]  [address] [place]  [address]  P: [phone]  F: [phone]  8/5: CHW spoke with admissions they do not have an appropriate bed for pt.      [facility]  [address] [place]  [address]  P: [phone]  F: [phone]  8/5:CHW spoke with admissions and they don't work with the pt insurance ([employer])      [facility]  Contact: Kelsey Carballo  P: [phone]  F: [phone]  [email or web address]  8/5:CHW spoke with Kelsey and she said they are just going to pass on her and no further explanation.           Private pay costs discussed: Not applicable    Additional Information:  0844AM VELASQUEZ received a call from pts sister Vandana.  She inquired if pt was able to transfer to [facility].  VELASQUEZ informed them that pt has not been accepted there and that we are still looking for placement.  During the phone call, VELASQUEZ could hear someone else in the background telling Vandana to ask if pt was not inpatient if she could be admitted quicker.  VELASQUEZ educated them on the placement process and also that being inpatient vs outpatient does not determine if a pt is admitted to a facility. VELASQUEZ heard someone in the background say, \"I want to ask her a question.\" VELASQUEZ thinks that it was April (pts daughter) but when she introduced herself her speech was muffled and garbled.  April inquired about calling the hospital yesterday and some staff member telling her that pt was not hospitalized and not here at the hospital and staff did not know who pt was.  VELASQUEZ reassured April that pt was still here at the hospital.  April said, \"OK.\" VELASQUEZ advised her and Vandana woo VELASQUEZ will call them to update them on any accepting " facilities.     CHW Dipesh followed up on referrals.    SW sent new initial referrals.     will continue to follow for discharge planning, support, and resources.    ANA Fajardo, [facility]  [address]   Office: [phone]   Pager: [phone]  [email or web address]

## 2022-08-09 NOTE — PLAN OF CARE
Goal Outcome Evaluation:    Plan of Care Reviewed With: patient     Overall Patient Progress: no change    Outcome Evaluation: Pt vital stable on 2L NC (baseline), Pt. reports back pain, PRN medication administred with effective result. Ate 75% breakfast and lunch. No sign  and symptom of infection noted. Continued close observation for activity.

## 2022-08-09 NOTE — PROGRESS NOTES
Care Management Follow Up    [facility]  [address] [facility] [address] [place]  P: [phone]  F: [phone]   8/9: CHW spoke with admissions they haven't reviwed pt yet. Will call SW once they do.      [facility] [address] [place]  [address]  P: [phone]  F: [phone]   8/9:CHW LVM for admissions to f/u on referral; requested they call SW back.    Raghavendra Tejeda   Inpatient Community Health Worker  [facility]

## 2022-08-09 NOTE — PROGRESS NOTES
[facility]    Medicine Progress Note - Hospitalist Service, GOLD TEAM 9    Date of Admission:  8/1/2022    Assessment & Plan            Kim Anne is a 76 year old female with a pmh of ESRD on IHD M/W/F via RUE fistula, CAD and history of NSTEMI, chronic diastolic heart failure, COPD with chronic hypoxic respiratory failure (2L NC at home), and dementia who presents with sister who is her guardian needing assistance setting up dispo.    Today's updates:  - No reported or recurrent hemoptysis  - no complaints per pt/family this AM during my visit.  - Pt may at times refuse iHD as did 8/6, did have HD on 8/8  - Working towards placement  - Guardian and decision maker is Sister: Vandana    #Hemoptysis 8/6  #Acute hypoxemic respiratory failure   - no clear source on CT PE, was reported that the night prior pt has a nosebleed (unwitnessed by this author) so potentially some post nasal gtt ed and caused the respiratory failure given improved w/ coughin mucous up. No crusted blood externally on nares 8/7 midday exam; unable to visualize pharynx 2/2 redundancy  - cbc stable        #GOC  - Vandana (guardian) per my discussions on 8/4 and 8/6 is trying to arrange hospice care for patient, though unclear what facility vs home will be best option per     8/5: confirmed with Vandana over the phone that patient's code status is DNR/DNI, though we are continuing with hemodialysis and restorative care at present pending dispo plan    8/6: called Vandana, given patient declined hemodialysis today. I told her this and asked if she would like us to stop offereing hemodialysis in the hospital, knowing that this would give Kim days to week of life. Presently Vandana's goal is to get patient closer to home / family for hospice so she wants us to continue to offer dialysis. Understands that if patient declines dialysis regularly patient could die, so we will continue to give  hemodialysis if she lets us.    1. ESRD on IHD M/W/F via RUE fistula  2. Fluid overloaded  3. Hyperkalemia  4. Hyponatremia  - Nephrology consulted, hemodialysis     #Hypotension  - taken on left extremity given RUE fistula, however Provider Advisory says left subclavian stenosis present thus these may not be accurate. On exam left hand cooler than right with weaker radial pulse which supports altered perfusion. CTPE 8/7 confirms occluded left subclavian artery stent, so left arm pressures will be inaccurate  - leg pressures preferred  - continue with home midodrine    3. Severe COPD  4. Chronic hypoxic respiratory failure  - currently on 2L NC at home and COPD is likely the cause of her hypoxia as she previously required home O2 for exertion prior to previous admission where she was discharge with continuous 2L NC after possible Strep Mitis bacteremia and possible aspiration PNA .  - restart mucomyst, budesonide, formoterol, and  duoneb    5. Chronic Diastolic Heart failure  7. History of CAD  - ECHO 6/28 showed an EF on 55-60% with grade 3 or advanced diastolic dysfunction with low normal RV function  - chronically elvated troponin given ESRD  - ASA 81 and statin    8. Type 2 diabetes  - currently not taking any PTA hyperglycemic medication   And no blood sugar checks indicated given trend normal    9. Hyperlipidemia  - restart PTA lipitor    10. Hypothyroidism  - restart PTA synthroid --> increased from 200 to 250 mcg daily 8/2 for low T4    11. GERD  - restart PTA protonix    12. Anemia of Chronic Disease  - hgb 10.1 in ED  - hgb during previous admission stayed around mid 7s  - will transfuse if hbg <7     13. Advanced care planning  14. Dementia  - social work needed for help with SNF placement, IHD center, and home going oxygen support.  - patient's sister is the guardian    Diet:  Renal diet  DVT Prophylaxis: Heparin SQ and Pneumatic Compression Devices  Chaney Catheter: Not present  Central Lines:  "None  Cardiac Monitoring: None  Code Status:  Full Code         Diet: Renal Diet (dialysis)  Room Service    DVT Prophylaxis: Heparin SQ  Chaney Catheter: Not present  Central Lines: None  Cardiac Monitoring: None  Code Status: No CPR- Do NOT Intubate      Disposition Plan      Expected Discharge Date: 08/12/2022    Discharge Delays: Placement - LTC  Destination: long-term care facility  Discharge Comments: Medically ready for discharge to hospice facilty        The patient's care was discussed with the Care Coordinator/ and Patient    Sidney Grande MD  Hospitalist Service, [address]  Securely message with the Vocera Web Console (learn more here)  Text page via [employer] Paging/Directory   Please see signed in provider for up to date coverage information      Clinically Significant Risk Factors Present on Admission         # Acute Kidney Injury, unspecified: based on a >150% or 0.3 mg/dL increase in creatinine on admission compared to past 90 day average, will monitor renal function        # Obesity: Estimated body mass index is 35.26 kg/m  as calculated from the following:    Height as of this encounter: 1.651 m (5' 5\").    Weight as of this encounter: 96.1 kg (211 lb 13.8 oz).        ______________________________________________________________________    Interval History     Seen today for follow up: dementia, chf, copd, sister is guardian    No acute overnight events per patient or patient's family.    No present complaints reported today    As of today/at time of my visit:  Patient denies any headache, sore throat  Patient denies any sleeping disturbance  Patient denies any nausea or vomiting  Patient reports no abdominal pain  Patient denies any shortness of breath   Patient denies any chest pain  Patient reports no arm or leg edema    Data reviewed today: I reviewed all medications, new labs and imaging results over the last 24 hours. I " personally reviewed the EKG tracing showing sinus bradycardia (not junctional as computer read suggested).    Physical Exam   Vital Signs: Temp: 97.9  F (36.6  C) Temp src: Oral BP: 110/42 Pulse: 102   Resp: 18 SpO2: 98 % O2 Device: Nasal cannula Oxygen Delivery: 2 LPM  Weight: 211 lbs 13.79 oz    Physical Exam   Constitutional: resting in bed  HEENT: EOMI, NC in palce  Neck: Symmetric  Cardiovascular: regular without murmurs or gallops  Pulmonary/Chest: bibasilar crackles. No respiratory distress.  GI: Soft, non-tender , non-distended    Musculoskeletal:  2+ lower extremity edema asymmetrical  Skin: Skin is warm and dry  Neurological: drowsy, disoriented  Psychiatric:  euthymic      Data   Medications       acetylcysteine  4 mL Inhalation BID     [Held by provider] aspirin  81 mg Oral At Bedtime     atorvastatin  40 mg Oral At Bedtime     budesonide  0.5 mg Nebulization BID     formoterol  20 mcg Nebulization Q12H     gabapentin  100 mg Oral Daily     [Held by provider] heparin ANTICOAGULANT  5,000 Units Subcutaneous Q12H     levothyroxine  250 mcg Oral Daily     midodrine  10 mg Oral TID w/meals     multivitamin RENAL  1 capsule Oral Daily     pantoprazole  40 mg Oral BID     sodium chloride (PF)  3 mL Intracatheter Q8H     vitamin B complex with vitamin C  1 tablet Oral Daily with supper     vitamin D3  50 mcg Oral Daily

## 2022-08-09 NOTE — PLAN OF CARE
Goal Outcome Evaluation:      Plan of Care Reviewed With: Patient     Overall Patient Progress: Ongoing     Outcome Evaluation: Pt A & O (self). Denies n/v. Pt reports pain around coccyx. Repositioned & ordered pulsate mattress. Pt fed herself w/ no assistance. Sacral mepilex applied for protection.

## 2022-08-10 NOTE — PROGRESS NOTES
[facility]    Medicine Progress Note - Hospitalist Service, GOLD TEAM 9    Date of Admission:  8/1/2022    Assessment & Plan            Kim Anne is a 76 year old female with a pmh of ESRD on IHD M/W/F via RUE fistula, CAD and history of NSTEMI, chronic diastolic heart failure, COPD with chronic hypoxic respiratory failure (2L NC at home), and dementia who presents with sister who is her guardian needing assistance setting up dispo.    Today's updates:  - Dialysis not planned to continue as outpatient-consistent with hospice planning.   - Working towards placement  - Guardian and decision maker is Sister: Vandana    #Hemoptysis 8/6  #Acute hypoxemic respiratory failure   - no clear source on CT PE, was reported that the night prior pt has a nosebleed (unwitnessed by this author) so potentially some post nasal gtt ed and caused the respiratory failure given improved w/ coughin mucous up. No crusted blood externally on nares 8/7 midday exam; unable to visualize pharynx 2/2 redundancy  - cbc stable    #GOC  - Vandana (guardian) per my discussions on 8/4 and 8/6 is trying to arrange hospice care for patient, though unclear what facility vs home will be best option per SW    8/5: confirmed with Vandana over the phone that patient's code status is DNR/DNI, though we are continuing with hemodialysis and restorative care at present pending dispo plan    8/6: called Vandana, given patient declined hemodialysis today. I told her this and asked if she would like us to stop offereing hemodialysis in the hospital, knowing that this would give Kim days to week of life. Presently Vandana's goal is to get patient closer to home / family for hospice so she wants us to continue to offer dialysis. Understands that if patient declines dialysis regularly patient could die, so we will continue to give hemodialysis if she lets us.    ESRD on IHD M/W/F via RUE fistula  Fluid  overloaded  Hyperkalemia  Hyponatremia  - Nephrology consulted, hemodialysis     #Hypotension  - taken on left extremity given RUE fistula, however Provider Advisory says left subclavian stenosis present thus these may not be accurate. On exam left hand cooler than right with weaker radial pulse which supports altered perfusion. CTPE 8/7 confirms occluded left subclavian artery stent, so left arm pressures will be inaccurate  - leg pressures preferred  - continue with home midodrine    Severe COPD  Chronic hypoxic respiratory failure  - currently on 2L NC at home and COPD is likely the cause of her hypoxia as she previously required home O2 for exertion prior to previous admission where she was discharge with continuous 2L NC after possible Strep Mitis bacteremia and possible aspiration PNA .  - restart mucomyst, budesonide, formoterol, and  duoneb    Chronic Diastolic Heart failure  History of CAD  - ECHO 6/28 showed an EF on 55-60% with grade 3 or advanced diastolic dysfunction with low normal RV function  - chronically elvated troponin given ESRD  - ASA 81 and statin    Type 2 diabetes  - currently not taking any PTA hyperglycemic medication   And no blood sugar checks indicated given trend normal    Hyperlipidemia  - restart PTA lipitor    Hypothyroidism  - restart PTA synthroid --> increased from 200 to 250 mcg daily 8/2 for low T4    GERD  - restart PTA protonix    Anemia of Chronic Disease  - hgb 10.1 in ED  - hgb during previous admission stayed around mid 7s  - will transfuse if hbg <7     Advanced care planning  Dementia  - social work needed for help with SNF placement, IHD center, and home going oxygen support.  - patient's sister is the guardian    Diet:  Renal diet  DVT Prophylaxis: Heparin SQ and Pneumatic Compression Devices  Chaney Catheter: Not present  Central Lines: None  Cardiac Monitoring: None  Code Status:  Full Code       Diet: Renal Diet (dialysis)  Room Service    DVT Prophylaxis: Heparin  "SQ  Chaney Catheter: Not present  Central Lines: None  Cardiac Monitoring: None  Code Status: No CPR- Do NOT Intubate      Disposition Plan      Expected Discharge Date: 08/12/2022    Discharge Delays: Placement - LTC  Destination: long-term care facility  Discharge Comments: Medically ready for discharge to hospice facilty        The patient's care was discussed with the Care Coordinator/ and Patient    Sidney Grande MD  Hospitalist Service, GOLD TEAM [address] [place] [address]  Securely message with the Vocera Web Console (learn more here)  Text page via [employer] Paging/Directory   Please see signed in provider for up to date coverage information      Clinically Significant Risk Factors Present on Admission         # Acute Kidney Injury, unspecified: based on a >150% or 0.3 mg/dL increase in creatinine on admission compared to past 90 day average, will monitor renal function        # Obesity: Estimated body mass index is 35.26 kg/m  as calculated from the following:    Height as of this encounter: 1.651 m (5' 5\").    Weight as of this encounter: 96.1 kg (211 lb 13.8 oz).        ______________________________________________________________________    Interval History     Seen today for follow up: dementia, chf, copd, sister is guardian    No acute overnight events per patient or patient's family.    No present complaints reported today when seen during dialysis.  Family at bedside    As of today/at time of my visit:  Patient denies any headache, sore throat  Patient denies any sleeping disturbance  Patient denies any nausea or vomiting  Patient reports no abdominal pain  Patient denies any shortness of breath   Patient denies any chest pain  Patient reports no arm or leg edema    Data reviewed today: I reviewed all medications, new labs and imaging results over the last 24 hours. I personally reviewed no images or EKG's today.    Physical Exam   Vital Signs: Temp: 97.8  F " (36.6  C) Temp src: Oral BP: 132/40 Pulse: 67   Resp: 15 SpO2: 90 % O2 Device: Nasal cannula Oxygen Delivery: 2 LPM  Weight: 211 lbs 13.79 oz    Physical Exam   Constitutional: resting in bed  HEENT: EOMI, NC in palce  Neck: Symmetric  Cardiovascular: regular without murmurs or gallops  Pulmonary/Chest: bibasilar crackles. No respiratory distress.  GI: Soft, non-tender , non-distended    Musculoskeletal:  2+ lower extremity edema asymmetrical  Skin: Skin is warm and dry  Neurological: drowsy, disoriented  Psychiatric:  euthymic      Data   Medications       acetylcysteine  4 mL Inhalation BID     [Held by provider] aspirin  81 mg Oral At Bedtime     atorvastatin  40 mg Oral At Bedtime     budesonide  0.5 mg Nebulization BID     formoterol  20 mcg Nebulization Q12H     gabapentin  100 mg Oral Daily     [Held by provider] heparin ANTICOAGULANT  5,000 Units Subcutaneous Q12H     levothyroxine  250 mcg Oral Daily     midodrine  10 mg Oral TID w/meals     multivitamin RENAL  1 capsule Oral Daily     pantoprazole  40 mg Oral BID     sodium chloride (PF)  3 mL Intracatheter Q8H     vitamin B complex with vitamin C  1 tablet Oral Daily with supper     vitamin D3  50 mcg Oral Daily

## 2022-08-10 NOTE — PROGRESS NOTES
Care Management Follow Up    [facility]  [address] [place]  [address]  P: [phone]  F: [phone]  8/10: CHW LVM for admissions to call back SW      [facility]  [address] [place]  [address]  P: [phone]  F: [phone]  8/10: CHW LVM for admissions to call back SW      [facility]  [address] [place]  [address]  P: [phone]  F: [phone]  8/10:CHW LVM for admissions to call back SW      [facility]  [address] [place]  [address]  P: [phone]  F: [phone]  8/10: CHW LVM for admissions to call back SW     [facility]  [address]  P: [phone]  F: [phone]   8/10: CHW spoke with admissions they haven't reviwed pt yet. Will call SW once they do.      Declined:  [facility]  [address] [place]  [address]  P: [phone]  F: [phone]  8/9: Declined. No appropriate bed.     [facility]  [address] [place]  [address]  P: [phone]  P: [phone] - Admissions  F: [phone]  8/4 Declined.  Will not be taking any LTC patients due to relocation to [place].     [facility]  [address]  [address]  P: [phone]  F: [phone]   8/5:CHW spoke with admissions and they are currently at capacity and there two months out.     [facility]  [address] [place]  [address]  P: [phone]  F: [phone]   8/5: CHW spoke with admissions and they don't work with the pt insurance ([place])      [facility]  [address] [place]  [address]  P: [phone]  F: [phone]  8/4 Declined. Cannot meet pts needs.      [facility]  [address] [place]  [address]  P: [phone]  F: [phone]  8/5: CHW spoke with admissions they do not have an appropriate bed for pt.      [facility]  [address] [place]  [address]  P: [phone]  F: [phone]  8/5:CHW spoke with admissions and they don't work with the pt insurance ([place])      [facility] [place] [facility]  Contact: Kelsey Carballo  P: [phone]  F: [phone]  [email or web address]  8/5:CHW spoke with Kelsey and she said they are just going to pass on her and no further explanation.     Raghavendra Tejeda   Inpatient Community Health Worker  [facility] 5A & 5B

## 2022-08-10 NOTE — PLAN OF CARE
Goal Outcome Evaluation:    Plan of Care Reviewed With: patient     Overall Patient Progress: no change    Outcome Evaluation: Disoriented to time and situation. 2 LPM NC. Denies pain. HD at 0800, returned at 1230. Poor appetite. Refused skin assessment. Confused.

## 2022-08-10 NOTE — PLAN OF CARE
Goal Outcome Evaluation:    Plan of Care Reviewed With: patient     Overall Patient Progress: no change    Outcome Evaluation: Pt is on 2 L NC, at baseline. Pt denied pain overnight and slept for a majority of the night. Had some snacks, she ate about 50% of her courtesy meal. Safety rounding done hourly for pt safety.    OBS goals:  Find safe dispo after hospital stay: NOT MET, pending reccs from , need to find long term care facility.

## 2022-08-10 NOTE — PROGRESS NOTES
HEMODIALYSIS TREATMENT NOTE    Date: 8/10/2022  Time: 12:15 PM    Data:  Pre Wt: 96.1 kg     Desired Wt:  93.1 kg   Post Wt:  95.1 kg  Weight change:  1 kg  Ultrafiltration - Post Run Net Total Removed (mL): 1000 mL  Vascular Access Status: patent  Dialyzer Rinse: Clear  Total Blood Volume Processed: 78.02 L Liters  Total Dialysis (Treatment) Time: 3.5 Hours    Lab:   Yes    Interventions:  Midodrine  Retacrit  Albumin 25%  400 BFR  600 DFR    Assessment:  Pt completed a 3.5 hr HD run with 1L net fluid removed. Asymptomatic hypotension throughout tx, requiring NS bolus, albumin 25%, and midodrine. Pt remained confused, alternately slept and alert. Family member at bedside during run. Access patent with no s/s infection or active bleeding noted, mild pressure dressings in place and c/d/i.       Plan:    Per renal team

## 2022-08-10 NOTE — PROGRESS NOTES
Care Management Follow Up    Length of Stay (days): 3    Expected Discharge Date: 08/12/2022     Concerns to be Addressed: discharge planning     Patient plan of care discussed at interdisciplinary rounds: Yes    Anticipated Discharge Disposition: Long Term Care/Hospice  Anticipated Discharge Services: TBD  Anticipated Discharge DME: TBD    Patient/family educated on Medicare website which has current facility and service quality ratings: Yes  Education Provided on the Discharge Plan: Yes   Patient/Family in Agreement with the Plan: yes    Referrals Placed by CM/SW:   [facility]  [address] [place]  [address]  P: [phone]  F: [phone]  8/10: CHW LVM for admissions to call back [facility] [address]  [address] [place]  [address]  P: [phone]  F: [phone]  8/10: CHW LVM for admissions to call back [facility]  [address] [place]  [address]  P: [phone]  F: [phone]  8/10:CHW LVM for admissions to call back [facility]  [address] [place]  [address]  P: [phone]  F: [phone]  8/10: CHW LVM for admissions to call back [facility]  [address]  P: [phone]  F: [phone]   8/10: CHW spoke with admissions they haven't reviwed pt yet. Will call  once they do.      Declined:  [facility]  [address] [place]  [address]  P: [phone]  F: [phone]  8/9: Declined. No appropriate bed.     [facility]  [address] [place]  [address]  P: [phone]  P: [phone] - Admissions  F: [phone]  8/4 Declined.  Will not be taking any LTC patients due to relocation to [place].     [facility]  [address] [place] [address]  [address]  P: [phone]  F: [phone]   8/5:CHW spoke with admissions and they are currently at capacity and  there two months out.     [facility]  [address] [place]  [address]  P: [phone]  F: [phone]   8/5: CHW spoke with admissions and they don't work with the pt insurance ([place])      [facility]  [address] [place]  [address]  P: [phone]  F: [phone]  8/4 Declined. Cannot meet pts needs.      [facility]  [address] [place]  [address]  P: [phone]  F: [phone]  8/5: CHW spoke with admissions they do not have an appropriate bed for pt.      [facility]  [address] [place]  [address]  P: [phone]  F: [phone]  8/5:CHW spoke with admissions and they don't work with the pt insurance ([employer])      [facility] [place] [facility]  Contact: Kelsey Carballo  P: [phone]  F: [phone]  [email or web address]  8/5:CHW spoke with Kelsey and she said they are just going to pass on her and no further explanation.      VELASQUEZ followed up referral:  [facility]  [address] [place]  [address]  P: [phone]  F: [phone]  Cell: [phone]  8/10: VELASQUEZ received a call from Enrike Mcginnis and he reported that pt is clinically approved and they may have a LTC bed available.  Enrike will check and call back VELASQUEZ with any updates.      Private pay costs discussed: Not applicable    Additional Information:  @1200AM VELASQUEZ received a call from Beulah (Nephrology PA) to discuss discharge needs for pt. VELASQUEZ informed that pts blood pressures are very low, that pt will need a sitter at OPH[facility] if pt will receive OPHD due to confusion, and pt does not want to be on dialysis.  At this point, it appears that pt receiving continued dialysis would be challenging.  Long term care with hospice seems more appropriate to pts case at this time. Guardian has been agreeable for hospice level of care.  MATT Riojas also reports that pts Sister Vandana(legal guardian) reported that pt does not want to continue with dialysis  treatment.    @1347PM VELASQUEZ paged Dr. Grande to discharge plans and options for placement at discharge if pt discontinues dialysis. (LTC vs Hospice Facility)    @1510PM VELASQUEZ received a call from Enrike Mcginnis liaison for [facility] ([address] [place]  [address] P: [phone]  F: [phone]) SW informed that they do have a LTC bed available but since there is currently no record of pt getting a booster shot, pt would need to receive a booster shot to be placed in a shared room.,  After booster shot, would need to wait 2 weeks after booster shot to be admitted.     @1521PM VELASQUEZ paged and spoke with Dr. Grande about discharge planning. Dr. Grande will try to see if pt did get her booster shot.  Dr. Grande reported that since pt/guardian has decided to cease dialysis that pts life expectancy would be around 1-2 weeks.  Therefore, a hospice facility may be more appropriate for pt.      VELASQUEZ called pt guardian Vandana to discuss discharge planning but her voicemail box was not set up and SW was unable to LVM.       will continue to follow for discharge planning, support, and resources.    ANA Fajardo, [facility]  [address]   Office: [phone]   Pager: [phone]  [email or web address]

## 2022-08-10 NOTE — PROGRESS NOTES
"Nephrology Progress Note  08/10/2022       Kim Anne is a 76 yof with complex medical hx including ESRD on MWF HD, severe COPD on home O2 (still smoking), HFpEFF, CAD, DM2, HLP, who is admitted with failure to thrive and volume overload due to hypotension on dialysis.  Nephrology consulted for management of dialysis.          Assessment & Recommendations:   ESRD-On HD MWF generally, had last run before admission on Friday 7/29                -HD per MWF schedule                -Access is R arm fistula.     - per discussion with pt's guardian/sister Vandana today, pt will discharge on hospice and dialysis will not be continued        Volume-EDW 85 kg, volume up with 1-2+ edema     BP: fairly well controlled 120-140's     BMD-Ca 8's, alb 3's, phos 5's     Anemia-Hgb 9's   - continue epogen 2000 units per HD        Recommendations were communicated to primary team via note    Beulah Hutchins PA-C  [phone]    Interval History :   Very calm again today, slept through most of run. Soft pressures, 1 kg UF.  No complaints, no CP, SOB, n/v, chills. Long discussion with pt's sister/guardian who is planning for hospice closer to home and stopping HD.    Review of Systems:   4 point ROS neg other than as noted above    Physical Exam:   I/O last 3 completed shifts:  In: 3 [I.V.:3]  Out: -    /40 (BP Location: Left arm)   Pulse 67   Temp 97.8  F (36.6  C) (Oral)   Resp 15   Ht 1.651 m (5' 5\")   Wt 96.1 kg (211 lb 13.8 oz)   SpO2 90%   BMI 35.26 kg/m       GENERAL APPEARANCE: calm, NAD  EYES: No scleral icterus  Pulmonary: lungs rhonchi to auscultation with equal breath sounds bilaterally, no clubbing or cyanosis  CV: Regular rhythm, normal rate, no rub   - Edema +2-3 LE  GI: soft, nontender  MS: no evidence of inflammation in joints, no muscle tenderness  : No Chaney  SKIN: no rash, warm, dry  NEURO: calm    Labs:   All labs reviewed by me  Electrolytes/Renal -   Recent Labs   Lab Test 08/10/22  0820 " 08/09/22  0623 08/08/22  0635 07/04/22  0740 07/02/22  0516 07/01/22  0556 06/27/22  1126 10/20/21  0855   * 129* 129*   < > 133* 133*   < > 138   POTASSIUM 4.4 4.9 4.9   < > 4.1 4.0   < > 3.9   CHLORIDE 94* 93* 92*   < > 97* 98   < > 101   CO2 26 25 25   < > 23 25   < > 30   BUN 32.9* 25.6* 36.3*   < > 25.1* 20.6   < > 37*   CR 4.28* 3.51* 4.54*   < > 2.98* 2.38*   < > 3.73*   * 114* 142*   < > 103* 102*   < > 158*   FRANCES 8.7* 8.5* 8.8   < > 8.2* 8.3*   < > 8.7   MAG  --   --   --   --  1.7 1.5*  --  1.7   PHOS 5.3* 4.9* 5.9*   < >  --   --   --   --     < > = values in this interval not displayed.       CBC -   Recent Labs   Lab Test 08/10/22  0820 08/09/22  0623 08/08/22  0635   WBC 6.7 8.0 7.3   HGB 9.3* 9.8* 9.4*    197 229       LFTs -   Recent Labs   Lab Test 08/10/22  0820 08/09/22  0623 08/08/22  0635 08/03/22  0658 07/04/22  0740 06/27/22  1612 06/27/22  1128   ALKPHOS  --   --   --   --  228* 312* 346*   BILITOTAL  --   --   --   --  0.3 0.5 0.4   ALT  --   --   --   --  8* 13 14   AST  --   --   --   --  21 37* 23   PROTTOTAL  --   --   --   --  6.0* 6.9 6.1*   ALBUMIN 3.5 3.5 3.6   < > 2.9* 3.6 3.0*    < > = values in this interval not displayed.       Iron Panel -   Recent Labs   Lab Test 06/28/22  0454 06/03/21  0533 06/03/21  0349 02/25/20  0955   IRON 35*  --  27* 47   IRONSAT 16  --  7* 20   *   < >  --  1,088*    < > = values in this interval not displayed.           Current Medications:    acetylcysteine  4 mL Inhalation BID     [Held by provider] aspirin  81 mg Oral At Bedtime     atorvastatin  40 mg Oral At Bedtime     budesonide  0.5 mg Nebulization BID     formoterol  20 mcg Nebulization Q12H     gabapentin  100 mg Oral Daily     [Held by provider] heparin ANTICOAGULANT  5,000 Units Subcutaneous Q12H     levothyroxine  250 mcg Oral Daily     midodrine  10 mg Oral TID w/meals     multivitamin RENAL  1 capsule Oral Daily     pantoprazole  40 mg Oral BID     sodium  chloride (PF)  3 mL Intracatheter Q8H     vitamin B complex with vitamin C  1 tablet Oral Daily with supper     vitamin D3  50 mcg Oral Daily

## 2022-08-11 NOTE — PROGRESS NOTES
Sepsis Evaluation Progress Note    I was called to see Kim Anne due to abnormal vital signs triggering the Sepsis SIRS screening alert. She is known to have an infection.     Physical Exam   Vital Signs:  Temp: 98.3  F (36.8  C) Temp src: Oral BP: (!) 145/34 Pulse: 77   Resp: 16 SpO2: 96 % O2 Device: Nasal cannula Oxygen Delivery: 2 LPM     General: acutely ill appearing  Mental Status: intermittently confused but able to answer some questions appropriately,   Cardiac: regular rate and rhythm, no appreciable murmurs, rubs or gallops  Lungs: breathing comfortably on 2L nasal canula, coarse breath sounds throughout bilateral lung fields  Abdomen: moderately distended, soft, non-tender to palpation throughout, no rebound or guarding  Skin: warm, dry, scattered ecchymosis over upper and lower extremities, RUE fistula present        Data   Lactic Acid   Date Value Ref Range Status   06/27/2022 1.0 0.7 - 2.0 mmol/L Final   06/27/2022 0.8 0.7 - 2.0 mmol/L Final   02/25/2020 1.0 0.7 - 2.0 mmol/L Final   01/26/2020 1.8 0.7 - 2.0 mmol/L Final     Lactic Acid POCT   Date Value Ref Range Status   06/27/2022 0.7 <=2.0 mmol/L Final     Lactate for Sepsis Protocol   Date Value Ref Range Status   06/02/2021 1.3 0.7 - 2.0 mmol/L Final   01/28/2020 1.1 0.7 - 2.0 mmol/L Final       Assessment & Plan   Kim Anne meets SIRS criteria but does NOT have a lactate >2 or other evidence of acute organ damage.  These vital sign, lab and physical exam findings constitute a diagnosis of SEPSIS.    Kim Anne is a 76 year old female with a past medical history of ESRD on IHD M/W/F via RUE fistula, CAD and history of NSTEMI, chronic diastolic heart failure, COPD with chronic hypoxic respiratory failure (2L NC at home), and dementia who presents with sister who is her guardian needing assistance setting up dispo and this evening she became hypotensive in the 50's and RRT was called to the bedside. Upon arrival, Ms.  "Dayana was sitting up in bed telling us, \"I haven't peed in days\".  She denies any chest pain or pain elsewhere in her body, headaches, vision changes, changes in her baseline shortness of breath.     Plan:   -Use blood pressure cuff in the non-occluded stent extremity for checks  -Give 0.9% NS 1L and Albumin 25%  -If these interventions do not quickly resolve her hypotension, consider checking blood/urine cultures and repeating baseline labs    **Patient quickly improved after fluids / albumin were given with no further hypotension.     Sepsis Time-Zero (time Sepsis diagnosis confirmed): 1830  08/10/22    Anti-infectives (From now, onward)    None        Current antibiotic coverage is appropriate for source of infection.     Disposition: The patient will remain on the current unit. We will continue to monitor this patient closely..  MATT Schumacher    Sepsis Criteria   Sepsis: 2+ SIRS criteria due to infection  Severe Sepsis: Sepsis AND 1+ new sign of acute organ dysfunction (Note: lactate >2 or acute encephalopathy each qualify as organ dysfunction)  Septic Shock: Sepsis AND hypotension despite volume resuscitation with 30 ml/kg crystalloid or lactate >=4  Note: HYPOTENSION is defined as 2 BP readings measured 3 hrs apart that have a SBP <90, MAP <65, or decrease >40 mmHg, occurring 6 hrs before or after t-zero    "

## 2022-08-11 NOTE — PLAN OF CARE
Goal Outcome Evaluation:       Vascular Access Rn called to place PIV.  Half way through procedure Pt. Refusing and pulling back arm Staff RN present.  RN told to call back later if Patient becomes more cooperative.

## 2022-08-11 NOTE — PROGRESS NOTES
Care Management Discharge Note    Discharge Date: 08/15/2022       Discharge Disposition: Long Term Care with Hospice Services    Discharge Services: Transportation-Stretcher Transport via [facility]    Discharge DME: None    Discharge Transportation: [facility] ([phone])    Private pay costs discussed: Not applicable    PAS Confirmation Code:  [id number]  Patient/family educated on Medicare website which has current facility and service quality ratings: Yes  Education Provided on the Discharge Plan:  Yes with pts legal guardian Vandana  Persons Notified of Discharge Plans: pt, pts legal guardian Vandana, treatment team, bedside nurse, nursing staff, [facility], [facility] hospice staff, .  Patient/Family in Agreement with the Plan: yes    Handoff Referral Completed: Yes    Additional Information:  VELASQUEZ spoke with Dr. Grande and he was able to find information from Pharmacy Dept. that pt did receive a COVID booster shot.  Pts first 2 COVID vaccine shots were on: 1/6/2021 and 2/5/2021.  Pt received booster shot on 11/11/2021.     VELASQUEZ spoke with Enrike Mcginnis from [facility](Cell: [phone]) to inform him that pt has received a booster shot. Enrike requested for SW to fax over documentation with pts COVID vaccine/booster dates.  VELASQUEZ faxed documentation to F: [phone].  Enrike reports that pt will be able to admit to facility between 1400-1500PM today.  Enrike requests for discharge orders to faxed to facility when signed/completed.    VELASQUEZ called [facility]([phone]) to arrange stretcher transportation w oxygen for pt.  Pts transport is scheduled for 1500PM.    VELASQUEZ called [facility]([phone]) and initiated referral for hospice services for pt at [facility].  VELASQUEZ was informed that they can accept pt and will plan for initial intake to be tomorrow at 0900AM at [facility] on [address].      Pt will be admitting to:  [facility] [address]  [place]  [address]  P: [phone]  F: [phone]  Cell: [phone] Enrike Mcginnis  8/11: Can admit pt today at 1500PM.    Pt will receive hospice services from [facility]:  P: [phone]  F: [phone]  Contact: Yuliet  8/11:Can accept pt and plans to meet with pt and legal guardian at LTC facility tomorrow at 0900AM.  Requests SW to fax discharge orders when completed/signed.      @1040AM VELASQUEZ met with pt and Vandana at bedside to provide document of  LTC facility contact/address information to pts legal guardian Vandana and also inform her that her presence is requested tomorrow by 0900AM at the LT facility to assist in completing hospice intake. VELASQUEZ also completed IMM document with Vandana(pts sister and legal guardian).  VELASQUEZ also informed pt about where she is going to be transferred to and pt reported that she is happy to leave the hospital.    @1124AM VELASQUEZ paged Dr. Grande that [facility] informed SW that they are still waiting on the hospice referral order to be placed.      VELASQUEZ received a call from [facility] that hospice referral has been ordered/received from provider.    @1305PM VELASQUEZ faxed discharge orders to [facility] and [facility].    ANA Fajardo, [facility]  [address]   Office: [phone]   Pager: [phone]  [email or web address]

## 2022-08-11 NOTE — PLAN OF CARE
Goal Outcome Evaluation:    Plan of Care Reviewed With: patient     Overall Patient Progress: declining    Outcome Evaluation: Pt is disoriented to time/situation, frequently asking about her sister. Pt rambling in speech, confusion persists, shouting out at times overnight. SpO2 intermittently desatting, pt now on oxymask 3-4L to keep sats above 92%. Pt continued to tear off pulse ox, so hard to continue to read SpO2.  Pt ripped out both PIVs placed during the day, attempted to get replaced, pt refused and agitated. Will try again in the AM. Gave tylenol for chronic pain. No BM. RUE fistula is intact.     Obs Goals: waiting safe dispo; NOT MET, SW working on reccs for outpt hospice.

## 2022-08-11 NOTE — PLAN OF CARE
Goal Outcome Evaluation:    Plan of Care Reviewed With: patient     Overall Patient Progress: no change    Outcome Evaluation: Pt is disoriented to time and situation. Garbled speech, rambling and confusion. Keeps asking about uncle Nhan who passed away years ago. Vitals are stable on 2L NC. Slept most of the morning. No pain. Coughing with little bit of yellow phlegm. No BM this shift. Pt is to dischagred today to [facility] at 1500.     OBS GOALS: Find safe disposition, MET Discharge to [facility]

## 2022-08-11 NOTE — PLAN OF CARE
Goal Outcome Evaluation:    Plan of Care Reviewed With: patient     Overall Patient Progress: declining     Pt alert, disoriented to time, situation and place. On 2 L O2 via nasal cannula. Rapid response called this evening due to decreased BPs 50s/30s. Albumin and 1 L NS bolus given with improvement in BP. Right fistula in place for HD, earlier this AM 1 L removed in HD, BPs decreased, PRN Midodrine and Albumin given. Continue scheduled Midodrine. Up with mechanical lift, repositioning with A2. Renal diet, not much appetite. Scheduled nebs with RT. Purewick in place. Left PIV x 2 saline locked. Denies pain. PRN Zofran given for complaints of nausea. Continue with POC.

## 2022-08-11 NOTE — DISCHARGE SUMMARY
[facility]  Hospitalist Discharge Summary      Date of Admission:  8/1/2022  Date of Discharge:  8/11/2022  Discharging Provider: Sidney Grande MD  Discharge Service: Hospitalist Service, GOLD TEAM 9    Discharge Diagnoses       Hemoptysis 8/6, single episode  Acute hypoxemic respiratory failure   GOC  ESRD on IHD M/W/F via RUE fistula  Fluid overloaded  Hyperkalemia  Hyponatremia  Hypotension  Severe COPD  Chronic hypoxic respiratory failure  Chronic Diastolic Heart failure  History of CAD  Type 2 diabetes  Hyperlipidemia  Hypothyroidism  GERD  Anemia of Chronic Disease  Advanced care planning  Dementia    Follow-ups Needed After Discharge    Follow-up Appointments     Follow Up and recommended labs and tests      Hospice follow up         {Additional follow-up instructions/to-do's for PCP    :Hospice Patient at discharge        Discharge Disposition   Transferred to LTC with hospice  Condition at discharge: Terminal      Hospital Course         Kim Anne is a 76 year old female with a pmh of ESRD on IHD M/W/F via RUE fistula, CAD and history of NSTEMI, chronic diastolic heart failure, COPD with chronic hypoxic respiratory failure (2L NC at home), and dementia who presents with sister who is her guardian needing assistance setting up dispo.     Today's updates:  - Dialysis not planned to continue as outpatient-consistent with hospice planning.   - Guardian and decision maker is Sister: Vandana  - 8/11: Pt will discharge to a LTC facility with hospice   > NO outpatient dialysis   > Meds completed   > family is aware       #Hemoptysis 8/6  #Acute hypoxemic respiratory failure   - no clear source on CT PE, was reported that the night prior pt has a nosebleed (unwitnessed by this author) so potentially some post nasal gtt ed and caused the respiratory failure given improved w/ coughin mucous up. No crusted blood externally on nares 8/7 midday exam; unable to  visualize pharynx 2/2 redundancy  - cbc stable     #GOC  - Vandana (guardian) per my discussions on 8/4 and 8/6 is trying to arrange hospice care for patient, though unclear what facility vs home will be best option per SW    8/5: confirmed with Vandana over the phone that patient's code status is DNR/DNI, though we are continuing with hemodialysis and restorative care at present pending dispo plan    8/6: called Vandana, given patient declined hemodialysis today. I told her this and asked if she would like us to stop offereing hemodialysis in the hospital, knowing that this would give Kim days to week of life. Presently Vandana's goal is to get patient closer to home / family for hospice so she wants us to continue to offer dialysis. Understands that if patient declines dialysis regularly patient could die, so we will continue to give hemodialysis if she lets us.    8/10: was decided no outpatient dialysis, and hospice planning confirmed to move forward     ESRD on IHD M/W/F via RUE fistula  Fluid overloaded  Hyperkalemia  Hyponatremia  - Nephrology consulted, hemodialysis      #Hypotension  - taken on left extremity given RUE fistula, however Provider Advisory says left subclavian stenosis present thus these may not be accurate. On exam left hand cooler than right with weaker radial pulse which supports altered perfusion. CTPE 8/7 confirms occluded left subclavian artery stent, so left arm pressures will be inaccurate  - leg pressures preferred  - continue with home midodrine     Severe COPD  Chronic hypoxic respiratory failure  - currently on 2L NC at home and COPD is likely the cause of her hypoxia as she previously required home O2 for exertion prior to previous admission where she was discharge with continuous 2L NC after possible Strep Mitis bacteremia and possible aspiration PNA .  - restart mucomyst, budesonide, formoterol, and  duoneb     Chronic Diastolic Heart failure  History of CAD  - ECHO 6/28  showed an EF on 55-60% with grade 3 or advanced diastolic dysfunction with low normal RV function  - chronically elvated troponin given ESRD  - ASA 81 and statin     Type 2 diabetes  - currently not taking any PTA hyperglycemic medication              And no blood sugar checks indicated given trend normal     Hyperlipidemia  - restart PTA lipitor     Hypothyroidism  - restart PTA synthroid --> increased from 200 to 250 mcg daily 8/2 for low T4     GERD  - restart PTA protonix     Anemia of Chronic Disease  - hgb 10.1 in ED  - hgb during previous admission stayed around mid 7s  - will transfuse if hbg <7     Advanced care planning  Dementia  - social work needed for help with SNF placement, IHD center, and home going oxygen support.  - patient's sister is the guardian      Consultations This Hospital Stay   NURSING TO CONSULT FOR VASCULAR ACCESS CARE IP CONSULT  CARE MANAGEMENT / SOCIAL WORK IP CONSULT  NEPHROLOGY ESRD ADULT IP CONSULT  NURSING TO CONSULT FOR VASCULAR ACCESS CARE IP CONSULT  CARE MANAGEMENT / SOCIAL WORK IP CONSULT  SPEECH LANGUAGE PATH ADULT IP CONSULT  NURSING TO CONSULT FOR VASCULAR ACCESS CARE IP CONSULT  NURSING TO CONSULT FOR VASCULAR ACCESS CARE IP CONSULT  NURSING TO CONSULT FOR VASCULAR ACCESS CARE IP CONSULT  NURSING TO CONSULT FOR VASCULAR ACCESS CARE IP CONSULT  NURSING TO CONSULT FOR VASCULAR ACCESS CARE IP CONSULT    Code Status   No CPR- Do NOT Intubate    Time Spent on this Encounter   I, Sidney Grande MD, personally saw the patient today and spent greater than 30 minutes discharging this patient.       Sidney Grande MD  [facility] [address] [place] [id number]  Phone: [phone]  ______________________________________________________________________    Physical Exam   Vital Signs: Temp: 97  F (36.1  C) Temp src: Oral BP: (!) 143/36 Pulse: 79   Resp: 18 SpO2: 98 % O2 Device: Nasal cannula Oxygen Delivery: 4 LPM  Weight: 203 lbs 7.75 oz    Alert,  awake, demented, in no acute distress       Primary Care Physician   Ailyn Nieves    Discharge Orders      General info for SNF    Length of Stay Estimate: Short Term Care: Estimated # of Days <30  Condition at Discharge: Terminal  Level of care:board and care  Rehabilitation Potential: Poor  Admission H&P remains valid and up-to-date: Yes  Recent Chemotherapy: N/A  Use Nursing Home Standing Orders: Yes     Mantoux instructions    Give two-step Mantoux (PPD) Per Facility Policy Yes     Follow Up and recommended labs and tests    Hospice follow up     Reason for your hospital stay    76 year old female with a pmh of ESRD on IHD M/W/F via RUE fistula, CAD and history of NSTEMI, chronic diastolic heart failure, COPD with chronic hypoxic respiratory failure (2L NC at home), and dementia who presents with sister who is her guardian needing assistance setting up dispo.     Activity - Up with nursing assistance     No CPR- Do NOT Intubate     Fall precautions     Diet    Follow this diet upon discharge: Orders Placed This Encounter      Room Service      Renal Diet (dialysis)       Significant Results and Procedures   Results for orders placed or performed during the hospital encounter of 08/01/22   Chest XR,  PA & LAT    Narrative    EXAM: XR CHEST 2 VIEWS 8/1/2022 6:00 PM    HISTORY: CHF.    COMPARISON: CT of the chest dated 6/28/2022    TECHNIQUE: Upright frontal and lateral views of the chest.    FINDINGS: Left subclavian artery stent.  Trachea is midline. Heart size is enlarged. Pulmonary vascular  congestion with increased interstitial pulmonary opacities. Small left  pleural effusion. Trace right pleural effusion. No pneumothoraces.      Impression    IMPRESSION:   1. Cardiomegaly with pulmonary edema.  2. Small left pleural effusion and trace right pleural effusion,  stable.    I have personally reviewed the examination and initial interpretation  and I agree with the findings.    RADHA LOYD, DO          SYSTEM ID:  [id number]   POC US ECHO LIMITED    Narrative    Limited Bedside ED Cardiac Ultrasound  PROCEDURE: PERFORMED BY: Dr. Michelle Elizondo MD  INDICATIONS/SYMPTOM:  Shortness of Breath  PROBE: Cardiac phased array probe  BODY LOCATION: Chest (cardiac)  FINDINGS: The ultrasound was performed utilizing the subcostal, parasternal long axis, parasternal short axis and apical 4 chamber views.  Grossly Grossly preserved LV contractility. Some RV dilation visualized. No pericardial effusion visualized. IVC plump with minimal respiratory variability.   INTERPRETATION:    Grossly normal LV contractility. No pericardial effusion. Mild RV dilation. IVC size and variability consistent with hypervolemia.   IMAGE DOCUMENTATION: Images were archived to PACs system.     POC US CHEST B-SCAN    Narrative    Limited Bedside ED Ultrasound of Thorax:  PROCEDURE: PERFORMED BY: Dr. Michelle Elizondo MD  INDICATIONS/SYMPTOM:  shortness of breath  PROBE: Cardiac phased array probe  BODY LOCATION: Chest (Lungs)  FINDINGS: Images of both lung hemithoracies taken in 2D in multiple rib spaces  Left lung: Sliding signs intact. Diffuse B-lines.  Right lung: Sliding signs intact. Diffuse B-lines.   INTERPRETATION: Evidence of pulmonary edema, no evidence of pneumothorax.   IMAGE DOCUMENTATION: Images were archived to PACs system.     XR Chest Port 1 View    Narrative    EXAM: XR CHEST PORT 1 VIEW  8/6/2022 8:47 PM     HISTORY:  possible hemoptysis       COMPARISON:  8/1/2022    FINDINGS: Left subclavian artery stent.  The pulmonary vasculature is indistinct.  There are increased  interstitial markings. Bilateral pleural effusions with associated  atelectasis or consolidation left greater than right. No acute bony  injury.      Impression    IMPRESSION:   1. Persistent interstitial opacities pulmonary edema versus infection.  \  2. Increased left basilar pleural effusion associated consolidation or  atelectasis. Cannot rule out that the opacity on  the left is blood  given history of hemoptysis.  If there is high concern obtain a CT.      I have personally reviewed the examination and initial interpretation  and I agree with the findings.    RAPHAEL BUTLER MD         SYSTEM ID:  [id number]   CT Chest (PE) Abdomen Pelvis w Contrast    Narrative    EXAMINATION: CTA pulmonary angiogram, 8/7/2022 5:14 AM     COMPARISON: CT 6/28/2022, radiograph 8/6/2022    HISTORY: Likely hemoptysis although possible GI bleed    TECHNIQUE: Volumetric helical acquisition of CT images of the chest  from the lung apices to the kidneys were acquired after the  administration of 80 mL of Isovue-370 IV contrast.  Post-processed  multiplanar and/or MIP reformations were obtained, archived to PACS  and used in interpretation of this study. Arterially phased images of  the abdomen also obtained.    FINDINGS:      PULMONARY ANGIOGRAM:    Diagnostic quality: Adequate.  Pulmonary embolism: None.    CHEST:    Lymph nodes: Multiple enlarged paratracheal, perihilar,  paraesophageal, and aortopulmonary lymph nodes.    Esophagus: Not well assessed on this arterially phased study. Possibly  filled with some fluid in the mid thoracic esophagus. Fluid  attenuating collection anterior to the distal esophagus, suspect a  locule of simple fluid.    Heart and great vessels: Biatrial enlargement. No pericardial  effusion. Dilated pulmonary arteries, main pulmonary artery measures  3.2 cm, right pulmonary artery measures 2.8 cm, left pulmonary artery  measures 2.7 cm.    Central airways: Plugging of the left apical posterior segmental  bronchus (series 7 image 36).    Lung parenchyma: Mild interlobular septal thickening. Subtle mosaic  groundglass opacification in the upper lobes. Dependent atelectasis.    Pleural effusion: Small left and trace right.    Pneumothorax: None.     ABDOMEN/PELVIS:    Hepatobiliary: Heterogeneous hepatic parenchymal attenuation.  Cholecystectomy.    Spleen: Not  enlarged.    Pancreas: Mildly atrophic. No ductal dilation. No suspicious lesions.    Adrenals: Normal.    Kidneys: Atrophic right kidney. Absent left kidney.    Bowel: Nondilated. Colonic diverticulosis without evidence to suggest  diverticulitis. Appendectomy.    Peritoneum: Large volume ascites. No free air.    Pelvic organs: Hysterectomy.    Lymph nodes: No abdominal or pelvic lymphadenopathy.    Vessels: Severe atherosclerotic calcification of the abdominal and  pelvic arteries most pronounced at the aortic bifurcation and origins  of the common iliac arteries. Likely occlusion of the right internal  iliac artery. No aneurysm. Partially visualized right upper extremity  AV fistula with intraluminal contrast. Occluded left subclavian artery  stent.    Bones: No acute or suspicious osseous abnormality. Lumbar spondylosis  with grade 1 anterolisthesis of L4 on L5, no pars interarticularis  defects.    Soft tissues: Severe subcutaneous edema. Small fluid containing  ventral hernia below the umbilicus.      Impression    IMPRESSION:   1. No pulmonary embolism. No evidence of pulmonary hemorrhage or other  overt findings to explain hemoptysis.  2. Improved scattered bronchial plugging with persistent mild blunting  in the left upper lobe.  3. Biatrial cardiac enlargement and dilated pulmonary arteries.  Increased pleural effusions, ascites, and subcutaneous edema.  4. No definite findings to indicate GI bleed.  5. Severe atherosclerotic disease. Occluded left subclavian artery  stent.  6. Mediastinal and hilar lymphadenopathy similar to 6/20/2022.  7. Patient is there is a simple including ascites and subcutaneous  edema.    I have personally reviewed the examination and initial interpretation  and I agree with the findings.    HAMIDA BARNETT MD         SYSTEM ID:  [id number]     *Note: Due to a large number of results and/or encounters for the requested time period, some results have not been displayed. A complete set  of results can be found in Results Review.       Discharge Medications   Current Discharge Medication List      CONTINUE these medications which have CHANGED    Details   acetaminophen (TYLENOL) 500 MG tablet Take 2 tablets (1,000 mg) by mouth every evening for 10 days  Qty: 10 tablet, Refills: 0    Associated Diagnoses: Hospice care patient      budesonide (PULMICORT) 0.5 MG/2ML neb solution Take 2 mLs (0.5 mg) by nebulization 2 times daily for 10 days  Qty: 40 mL, Refills: 0    Associated Diagnoses: Chronic bronchitis, unspecified chronic bronchitis type (H)      gabapentin (NEURONTIN) 100 MG capsule Take 1 capsule (100 mg) by mouth daily for 10 days  Qty: 10 capsule, Refills: 0    Associated Diagnoses: Hospice care patient      levothyroxine (SYNTHROID/LEVOTHROID) 125 MCG tablet Take 2 tablets (250 mcg) by mouth daily for 30 days  Qty: 60 tablet, Refills: 0    Associated Diagnoses: Hospice care patient      midodrine (PROAMATINE) 10 MG tablet Take 1 tablet (10 mg) by mouth 3 times daily (with meals) for 10 days  Qty: 30 tablet, Refills: 0    Associated Diagnoses: Hemodialysis-associated hypotension      pantoprazole (PROTONIX) 40 MG EC tablet Take 1 tablet (40 mg) by mouth 2 times daily for 30 days  Qty: 60 tablet, Refills: 0    Associated Diagnoses: Hospice care patient      vitamin B complex with vitamin C (STRESS TAB) tablet Take 1 tablet by mouth daily (with dinner) for 10 days  Qty: 10 tablet, Refills: 0    Associated Diagnoses: Hospice care patient      vitamin D3 (CHOLECALCIFEROL) 50 mcg (2000 units) tablet Take 1 tablet (50 mcg) by mouth daily for 10 days  Qty: 90 tablet, Refills: 3    Associated Diagnoses: Vitamin D deficiency disease         CONTINUE these medications which have NOT CHANGED    Details   Emollient (EUCERIN CALMING DAILY MOIST) CREA Externally apply 1 dose. topically daily  Qty: 1 Tube, Refills: 12    Associated Diagnoses: Type II or unspecified type diabetes mellitus with neurological  manifestations, not stated as uncontrolled(250.60) (H)      multivitamin RENAL (NEPHROCAPS/TRIPHROCAPS) 1 MG capsule Take 1 capsule by mouth daily  Qty: 90 capsule, Refills: 0    Associated Diagnoses: CKD (chronic kidney disease) stage 4, GFR 15-29 ml/min (H)      blood glucose monitoring (FREESTYLE LITE) test strip TEST BLOOD SUGAR TWO TIMES A DAY  Qty: 50 strip, Refills: 12    Associated Diagnoses: Type 2 diabetes mellitus without complication, with long-term current use of insulin (H)      blood glucose monitoring (FREESTYLE) lancets Test BS two times daily as directed  Qty: 100 each, Refills: 1    Associated Diagnoses: Type 2 diabetes mellitus without complication, with long-term current use of insulin (H)         STOP taking these medications       acetylcysteine (MUCOMYST) 10 % nebulizer solution Comments:   Reason for Stopping:         ammonium lactate (LAC-HYDRIN) 12 % external lotion Comments:   Reason for Stopping:         ASPIR-LOW 81 MG EC tablet Comments:   Reason for Stopping:         atorvastatin (LIPITOR) 40 MG tablet Comments:   Reason for Stopping:         formoterol (PERFOROMIST) 20 MCG/2ML neb solution Comments:   Reason for Stopping:         ipratropium - albuterol 0.5 mg/2.5 mg/3 mL (DUONEB) 0.5-2.5 (3) MG/3ML neb solution Comments:   Reason for Stopping:         LORazepam (ATIVAN) 0.5 MG tablet Comments:   Reason for Stopping:         nitroGLYcerin (NITROSTAT) 0.4 MG sublingual tablet Comments:   Reason for Stopping:         omeprazole (PRILOSEC) 20 MG DR capsule Comments:   Reason for Stopping:         polyethylene glycol (MIRALAX) packet Comments:   Reason for Stopping:         senna-docusate (SENOKOT-S/PERICOLACE) 8.6-50 MG tablet Comments:   Reason for Stopping:             Allergies   Allergies   Allergen Reactions     Contrast Dye Hives and Itching     Clonidine      She had as IP and thinks it made her itchy     Diatrizoate Other (See Comments)     Diltiazem      Severe bradycardia      Iodine-131

## 2022-08-11 NOTE — PLAN OF CARE
Goal Outcome Evaluation:    Plan of Care Reviewed With: patient     Overall Patient Progress: no change    Outcome Evaluation: Pt is disoriented to time and situation. Garbled speech, rambling and confusion. Keeps asking about uncle Nhan who passed away years ago. Vitals are stable on 2L NC. Slept most of the morning. No pain. Coughing with little bit of yellow phlegm. No BM this shift. Pt is to dischagred today to [facility] at 1500.

## 2022-08-12 NOTE — TELEPHONE ENCOUNTER
[facility] Triage Nurse Telephone Encounter    Provider: JUNIOR Carter DNP  Facility: [facility] Facility Type:  LT    Caller: Juan Ramon  Call Back Number: [phone]    Allergies:    Allergies   Allergen Reactions     Contrast Dye Hives and Itching     Clonidine      She had as IP and thinks it made her itchy     Diatrizoate Other (See Comments)     Diltiazem      Severe bradycardia     Iodine-131         Reason for call: Nurse is calling to report that patient just a large brown emesis, BP is 60/34, and O2 sat is 84% on 4L/min.  Patient was supposed to sign onto hospice today, but the patient's responsible party did not show up, so therefore she could not sign onto hospice.      Verbal Order/Direction given by Provider: Contact patient's guardian ASAP.  NP to speak with nurse manager.      Provider giving Order:  JUNIOR Carter DNP    Verbal Order given to: Juan Ramon Claudio RN

## 2022-08-12 NOTE — PROGRESS NOTES
Clinic Care Coordination Contact    Situation: Patient chart reviewed by care coordinator.    Background: Pt at [facility] due to pmh of ESRD on IHD M/W/F via RUE fistula, CAD and history of NSTEMI, chronic diastolic heart failure, COPD with chronic hypoxic respiratory failure (2L NC at home), and dementia .     Assessment: Pt has been discharged to [facility] with [facility] hospice care.     Plan/Recommendations: No outreaches will be made at this time unless a new referral is made or a change in the pt's status occurs.     Elaine Looney, [facility]  Clinic Care Coordinator  [facility] Risa Saline  [facility]  [phone]  [email or web address]
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
## 2022-08-17 NOTE — PROGRESS NOTES
Geriatrics Notification of Patient Death    Provider: JUNIOR Carter DNP  Place of death: [facility]  Facility Type:  LTC    Caller: Dior  Date of Death: 8/17/22 @ 1250    Patient was on Hospice care: No  Patient was seen by [facility] Geriatrics provider: Yes; please explain: Pao Campbell RN

## 2022-10-24 NOTE — ED NOTES
Bed: IN06  Expected date:   Expected time:   Means of arrival:   Comments:  Kim Merino  [id number]  72 yo F hx of CKD 2/2 DM, hx of kidney donation in 1988, had been lost to follow up since November 2018, went to clinic today; K 6.6, no EKG changes, edematous, some uremic symptoms  On IR schedule for tunnelled catheter placement tomorrow  CaGluconate and bicarb needs to be given per nephrology, in addition to lasix 100 mg IV, insulin/dextrose, albuterol  [facility] Nephr  ology Fellow called       Oneida Gama MD  05/01/19 1246     no

## 2022-10-31 NOTE — PROGRESS NOTES
08/05/22 0900   General Information   Onset of Illness/Injury or Date of Surgery 08/01/22   Referring Physician Scarlett Mccain PA-C   Pertinent History of Current Problem Pt is a 76 year old female with history of ESRD on HD M/W/F via RUE fistula, CAD and history of NSTEMI, chronic diastolic heart failure, COPD with chronic hypoxic respiratory failure (2L NC at home), and dementia.  She was recently admitted to [facility] from 6/27/2022 through 7/5/2022 with strep mitis bacteremia and acute hypoxic respiratory failure.  She discharged to [facility] for continuation of care.  She completed a 2-week course of IV ceftriaxone.  The patient wanted to be closer to family so her legal guardian took her out of the [facility] to move her up [place].  Unfortunately, she did not have enough oxygen to facilitate the transfer to their destination.  The patient's guardian (her sister) took the patient to the [facility] emergency department to facilitate transfer to [facility] and make arrangements for outpatient dialysis for after their arrival. Clinical swallow evaluation completed per PA order.   Pain Assessment   Patient Currently in Pain No   Type of Evaluation   Type of Evaluation Swallow Evaluation   Oral Motor   Oral Musculature generally intact   Structural Abnormalities none present   Mucosal Quality good   Dentition (Oral Motor)   Dentition (Oral Motor) some missing teeth   Facial Symmetry (Oral Motor)   Facial Symmetry (Oral Motor) WNL   Lip Function (Oral Motor)   Lip Range of Motion (Oral Motor) unable/difficult to assess  (Pt largely refusing to follow commands to adequately assess)   Tongue Function (Oral Motor)   Tongue ROM (Oral Motor) unable/difficult to assess   Cough/Swallow/Gag Reflex (Oral Motor)   Volitional Throat Clear/Cough (Oral Motor) WNL   Vocal Quality/Secretion Management (Oral Motor)   Vocal Quality (Oral Motor) WFL   Secretion Management (Oral Motor) WNL   General Swallowing Observations   Past History of  Dysphagia None per EMR review nor per pt report   Respiratory Support (General Swallowing Observations) nasal cannula   Current Diet/Method of Nutritional Intake (General Swallowing Observations, NIS) regular diet;thin liquids (level 0)   Swallowing Evaluation Clinical swallow evaluation   Clinical Swallow Evaluation   Feeding Assistance set up only required   Clinical Swallow Evaluation Textures Trialed thin liquids;solid foods   Clinical Swallow Eval: Thin Liquid Texture Trial   Mode of Presentation, Thin Liquids straw;self-fed   Volume of Liquid or Food Presented x3 sips   Oral Phase of Swallow WFL   Pharyngeal Phase of Swallow intact   Diagnostic Statement Adequate oral phase with no overt s/s aspiration with limited amount assessed   Clinical Swallow Evaluation: Solid Food Texture Trial   Mode of Presentation self-fed   Volume Presented 1/2 muffin   Oral Phase WFL   Pharyngeal Phase intact   Diagnostic Statement Adequate oral phase with no overt s/s aspiration with limited amount assessed   Esophageal Phase of Swallow   Patient reports or presents with symptoms of esophageal dysphagia No   Swallowing Recommendations   Diet Consistency Recommendations regular diet;thin liquids (level 0)   Supervision Level for Intake distant supervision needed   Mode of Delivery Recommendations bolus size, small;slow rate of intake   Swallowing Maneuver Recommendations alternate food and liquid intake   Monitoring/Assistance Required (Eating/Swallowing) stop eating activities when fatigue is present   Recommended Feeding/Eating Techniques (Swallow Eval) maintain upright sitting position for eating   Medication Administration Recommendations, Swallowing (SLP) whole with thin liquid   Instrumental Assessment Recommendations instrumental evaluation not recommended at this time   General Therapy Interventions   Planned Therapy Interventions Dysphagia Treatment   Dysphagia treatment Instruction of safe swallow strategies   Clinical  Impression   Criteria for Skilled Therapeutic Interventions Met (SLP Eval) Yes, treatment indicated   SLP Diagnosis Functional oropharyngeal swallowing mechanism   Risks & Benefits of therapy have been explained evaluation/treatment results reviewed;care plan/treatment goals reviewed;risks/benefits reviewed;current/potential barriers reviewed;participants voiced agreement with care plan;participants included;patient   Clinical Impression Comments SLP: Bedside swallow evaluation completed per PA orders. Pt presents with functional oropharyngeal swallowing mechanism and regular/thin liquid diet is recommended. Difficult to complete oral mechanism assessment as pt largely refusing to follow commands.  Pt was assessed with limited amounts regular solids and thin liquids as pt refused increased intake despite encouragement. Pt demonstrated functional and complete mastication, adequate bolus control, no oral residuals, was able to clear bolus with single swallow, no report of sticking sensation in throat, no change in vocal quality following PO trials, and no overt s/s aspiration noted. Recommend pt continue on current diet of regular solids and thin liquids. Pt presents with functional oropharyngeal swallowing mechanism however note that assessment was limited due to pt refusal, SLP will follow up to encourage PO intake to assess for ongoing diet tolerance.   Therapy Certification   Start of Care Date 08/05/22   Certification date from 08/05/22   Certification date to 08/19/22   Medical Diagnosis Dysphagia   SLP Goals   Therapy Frequency (SLP Eval) 5 times/wk   SLP Predicted Duration/Target Date for Goal Attainment 08/26/22   SLP Goals Swallow   SLP: Safely tolerate diet without signs/symptoms of aspiration Regular diet;Thin liquids   Swallowing Dysfunction &/or Oral Function for Feeding   Symptoms Noted During/After Treatment None   Treatment Detail/Skilled Intervention SLP: SLP: Bedside swallow evaluation complete.  Functional oropharyngeal swallowing mechanism. See POC note for further details. Following evaluation SLP provided education on safe swallow strategies, and aspiration signs/symptoms. Pt verbalized agreement and understanding with education and training provided.   SLP Discharge Planning   SLP Plan Short course for diet tolerance   SLP Discharge Recommendation   (Defer to MD)   SLP Rationale for DC Rec Pt with functional oropharyngeal swallowing mechanism, do not anticipate ongoing speech therapy will be warranted upon discharge   SLP Brief overview of current status  Recommend pt continue on current diet of regular solids and thin liquids. Pt should be fully alert and upright with all PO. Pt presents with functional oropharyngeal swallowing mechanism however note that assessment was limited due to pt refusal, SLP will follow up to encourage PO intake to assess for ongoing diet tolerance   Total Session Time   Total Evaluation Time (Minutes) 20       .                                                                           [facility]      OUTPATIENT SPEECH LANGUAGE PATHOLOGY EVALUATION  PLAN OF TREATMENT FOR OUTPATIENT REHABILITATION  (COMPLETE FOR INITIAL CLAIMS ONLY)  Patient's Last Name, First Name, M.I.  YOB: 1945  DayanaKim  Nelsy                        Provider's Name  [facility] Medical Record No.  [id number]                               Onset Date:  08/01/22  Start of Care Date: (P) 08/05/22    Type:     ___PT   ___OT   _X_SLP Medical Diagnosis: (P) Dysphagia          SLP Diagnosis:  Functional oropharyngeal swallowing mechanism  Visits from SOC:  1   ________________________________________________________________  Plan of Treatment/Functional Goals    Planned Interventions:   Dysphagia Treatment       Instruction of safe swallow strategies        Goals: See Speech Language Pathology Goals on Care Plan in Epic electronic  health record.    Therapy Frequency:5 times/wk   Predicted Duration of Therapy Intervention: 08/26/22  ________________________________________________________________________________    I CERTIFY THE NEED FOR THESE SERVICES FURNISHED UNDER        THIS PLAN OF TREATMENT AND WHILE UNDER MY CARE     (Physician co-signature of this document indicates review and certification of the therapy plan).              Certification date from: (P) 08/05/22 Certification date to: (P) 08/19/22    Referring Physician: Scarlett Mccain PA-C           Initial Assessment        See Speech Language Pathology documentation in Epic electronic health record, evaluation dated  (P) 08/05/22

## 2023-02-06 NOTE — PROGRESS NOTES
Per Dr. Leal patient to be admitted to [facility] for hyperkalemia K 6.6 and plan for scheduled tunneled catheter placement tomorrow. MD discussed case with IR.Call to Patient Admissions at [phone] @ [phone] by rapid response for bed placement.      Per Dr. Leal patient to be transported via [facility] ambulance.  Patient to be admitted to [facility].  Report called to hospital by rapid response RN    Vital Signs at Time of Transfer    Blood pressure:  167/97    Pulse: 67    12 lead obtained     Patient's son, Ki called by RN nephrology care coordinator. Ki states that he is appreciative of call  and plans to visit his mom in the next couple hours. I gave Ki my direct number to call if he has any concerns or additional questions. Verbalizes understanding of admission and everything discussed    All belongings sent with patient, rapid response team with patient     Patient stable upon transfer               Never smoker

## 2023-10-01 NOTE — PROGRESS NOTES
[facility] Care Coordination Contact    Call placed to member's home earlier today, male person answering the phone stated that Kim is not feeling well and she is sleeping. Explained that this CC has a scheduled home visit to meet with Kim at 12:00, explained that CC will come to the home and meet with Kim.    Went to member's home at 11:45, male person answered the door stating that he was her son. He stated that Kim is not feeling well and is sleeping. Explained that CC has spoken with member a couple times this week. expressed concern that she is not taking her medications because she cannot find them. Observed the family room and kitchen to be very unkept; observed laundry and garbage all over the floor, only a small path to walk., observed beds and bikes in the kitchen.  Noted a woman sleeping on the sofa and another male sitting in the living area, he stated that he was going to build a ramp for Kim. Kim's son asked if CC could come back later. CC did not feel comfortable going to the upstairs alone and agreed to a f/u visit later today.    This CC and co-worker Pili WHIPPLE went to member's home.  Met with member in her bedroom on the upper level. Member was lying in bed watching TV, noted an ashtray and cigarettes on the stand next to the bed. Member was awake, oriented to self and family members. Member states that she still cannot find her medications, she cannot recall where she usually keeps her medications. Member is able to recall being in the hospital and taking the medication for pneumonia.  Member states that she feels weak, states that she needs help with walking up/down the steps.  Offered short term TCU for PT/OT, member asked who would pay for the [facility] [place] stay because she does not want to give her check away.   Member agreeable that CC place calls to nursing facilities.  Asked member which family member I can speak with to review TCU planning, member stated her  daughter Angelina. Angelina was not in the home, but Angelina's daughter came to the room to discuss TCU plan, granddaughter agreeable that member needs more assistance.   Call placed to [facility] to inquire on availability, per Jessica (admissions) they do have availability, she will review Epic and f/u with CC.  CC was leaving the home, when member's daughter Angelina came. Shared above info with Angelina who also acknowledged that member needs to get stronger. CC provided business card to Angelina, stating that it is important that someone answer the telephone so we can work on the TCU plan. Explained to Angelina, that if a bed is available, we would plan for member to transfer right after dialysis tomorrow.      Spoke with Jessica at [facility], explained that member recently discharged from hospital, has not been taking any of her medications (except antibiotic), member reports feeling weak, requiring assistance with cares.  Plan would be short term TCU for PT/OT, also cognitive testing.  CC expressed concern that member's current home is in not good condition, but member will want to return home.  Explained that CC will review case with Medical Director for appropriateness to direct admit, as member has not taken any of her ordered medications. Member does have a PCP appt 12/6/19 @ 8:30.      Sakina Carrion RN, BC  Manager [facility] Care Coordinator   [phone]  (Fax)     Stable

## 2024-01-11 NOTE — TELEPHONE ENCOUNTER
Alcohol Swabs (SM ALCOHOL PREP) 70 % PADS  Requested Prescriptions  Last Written Prescription Date:  5/10/17  Last Fill Quantity: 100 each,  # refills: 11   Last office visit: 7/26/2018 with prescribing provider:     Future Office Visit:   Next 5 appointments (look out 90 days)     Aug 16, 2018 11:00 AM CDT   SHORT with Carmen Elder RPH   [facility] ([facility])    [address] [place] [address]  [address] [place] [id number]   [phone]            Aug 30, 2018 10:30 AM CDT   Return Visit with JUNIOR Bishop CNP   [facility] ([facility]    [address] [place] [address]  [address] [place] [id number]   [phone]            Aug 30, 2018 10:30 AM CDT   Return Visit with LAMINE Chahal   [facility] ([facility])    [address] [place] [address]  [address] [place] [id number]   [phone]                    Pending Prescriptions Disp Refills     Alcohol Swabs (SM ALCOHOL PREP) 70 % PADS 100 each 11     Sig: Externally apply 1 pad topically 4 times daily    There is no refill protocol information for this order           [Onychomycosis] : onychomycosis [Follow-Up Visit] : a follow-up visit for

## 2024-02-20 NOTE — TELEPHONE ENCOUNTER
----- Message from RT Inga sent at 6/21/2019  3:01 PM CDT -----  Thanks Lionel,    Do whatever works best even if she needs to be seen by another provider.    JAME Xiong, RRT  Chronic Pulmonary Disease Specialist  Office: [phone]   Pager: [phone]       ----- Message -----  From: Lionel Esquivel  Sent: 6/21/2019   1:21 PM  To: Monie Rodríguez RT    James Lomax,    I will reach out to the pt and see what works best for her schedule. I know dr Packer is going on leave soon, so I wouldn't be able to get her on Dr. packer's schedule anytime soon.     Thank you,  Lionel       ----- Message -----  From: Monie Rodríguez RT  Sent: 6/21/2019  12:03 PM  To: Lionel Flowers,    Saw this patient for COPD education today. She has seen Dr. Packer in the past, but has not been seen for >2 years. She is interested in reestablishment of care and will need repeat PFT's as they are 2 years old as well. Would you be able to schedule something for her that is 6-8 weeks out.    Thank you so much,  JAME Xiong, RRT  Chronic Pulmonary Disease Specialist  Office: [phone]   Pager: [phone]            Medication: tiamcinolone cream passed protocol.   Last office visit date: 05/12/23  Next appointment scheduled?: No;       Number of refills given: 0

## 2024-08-06 NOTE — SIGNIFICANT EVENT
Of note, came across a Patient Care Coordination Note regarding BPs on the left arm.  Note stated: BPs should be checked on right arm due to subclavian stenosis on the left side.  With the new AV fistula on the right, this no longer works.  Will have to check BPs on lower extremities to obtain accurate pressures.  
within normal limits

## (undated) DEVICE — SU PROLENE 7-0 BV175-6 24" 8735H

## (undated) DEVICE — SURGICEL HEMOSTAT 4X8" 1952

## (undated) DEVICE — SURGICEL ABSORBABLE HEMOSTAT SNOW 4"X4" 2083

## (undated) DEVICE — MANIFOLD KIT ANGIO AUTOMATED 014613

## (undated) DEVICE — DECANTER BAG 2002S

## (undated) DEVICE — PACK AV FISTULA

## (undated) DEVICE — Device

## (undated) DEVICE — LINEN TOWEL PACK X30 5481

## (undated) DEVICE — SU SILK 4-0 TIE 12X30" A303H

## (undated) DEVICE — SU VICRYL 3-0 SH 27" UND J416H

## (undated) DEVICE — VESSEL LOOPS DEV-O-LOOP WHITE MINI 31145728

## (undated) DEVICE — PACK HEART LEFT CUSTOM

## (undated) DEVICE — SOL NACL 0.9% IRRIG 1000ML BOTTLE 2F7124

## (undated) DEVICE — DRAPE EXTREMITY UPPER 120X76" 29414

## (undated) DEVICE — SUTURE BOOTS 051003PBX

## (undated) DEVICE — SU SILK 3-0 TIE 12X30" A304H

## (undated) DEVICE — KIT HAND CONTROL ACIST 014644 AR-P54

## (undated) DEVICE — COVER ULTRASOUND PROBE W/GEL FLEXI-FEEL 6"X58" LF  25-FF658

## (undated) DEVICE — SU MONOCRYL 4-0 PS-2 27" UND Y426H

## (undated) DEVICE — TUBING PRESSURE 30"

## (undated) DEVICE — CATH ANGIO INFINITI 3DRC 4FRX100CM 538476

## (undated) DEVICE — LINEN TOWEL PACK X6 WHITE 5487

## (undated) DEVICE — GLOVE PROTEXIS MICRO 7.5  2D73PM75

## (undated) DEVICE — PREP CHLORAPREP 26ML TINTED ORANGE  260815

## (undated) DEVICE — SU SILK 2-0 SH 30" K833H

## (undated) DEVICE — ADH SKIN CLOSURE PREMIERPRO EXOFIN 1.0ML 3470

## (undated) DEVICE — SOL WATER IRRIG 1000ML BOTTLE 2F7114

## (undated) DEVICE — ESU GROUND PAD ADULT W/CORD E7507

## (undated) DEVICE — EYE SPONGE SPEAR WECK CEL 0008685

## (undated) DEVICE — SU SILK 2-0 TIE 12X30" A305H

## (undated) DEVICE — INTRO SHEATH AVANTI 4FRX23CM 504604T

## (undated) DEVICE — SPONGE RAY-TEC 4X8" 7318

## (undated) DEVICE — DRAPE SHEET REV FOLD 3/4 9349

## (undated) DEVICE — CATH ANGIO INFINITI JL4 4FRX100CM 538420

## (undated) DEVICE — SUCTION MANIFOLD DORNOCH ULTRA CART UL-CL500

## (undated) RX ORDER — PHENYLEPHRINE HCL IN 0.9% NACL 1 MG/10 ML
SYRINGE (ML) INTRAVENOUS
Status: DISPENSED
Start: 2019-11-22

## (undated) RX ORDER — FENTANYL CITRATE 50 UG/ML
INJECTION, SOLUTION INTRAMUSCULAR; INTRAVENOUS
Status: DISPENSED
Start: 2019-11-22

## (undated) RX ORDER — DOBUTAMINE HYDROCHLORIDE 200 MG/100ML
INJECTION INTRAVENOUS
Status: DISPENSED
Start: 2018-03-02

## (undated) RX ORDER — FENTANYL CITRATE 50 UG/ML
INJECTION, SOLUTION INTRAMUSCULAR; INTRAVENOUS
Status: DISPENSED
Start: 2019-05-02

## (undated) RX ORDER — PAPAVERINE HYDROCHLORIDE 30 MG/ML
INJECTION INTRAMUSCULAR; INTRAVENOUS
Status: DISPENSED
Start: 2019-11-22

## (undated) RX ORDER — LIDOCAINE HYDROCHLORIDE 10 MG/ML
INJECTION, SOLUTION EPIDURAL; INFILTRATION; INTRACAUDAL; PERINEURAL
Status: DISPENSED
Start: 2019-05-02

## (undated) RX ORDER — ACETAMINOPHEN 325 MG/1
TABLET ORAL
Status: DISPENSED
Start: 2019-11-22

## (undated) RX ORDER — LIDOCAINE HYDROCHLORIDE 20 MG/ML
INJECTION, SOLUTION EPIDURAL; INFILTRATION; INTRACAUDAL; PERINEURAL
Status: DISPENSED
Start: 2019-11-22

## (undated) RX ORDER — FENTANYL CITRATE 50 UG/ML
INJECTION, SOLUTION INTRAMUSCULAR; INTRAVENOUS
Status: DISPENSED
Start: 2019-05-23

## (undated) RX ORDER — HEPARIN SODIUM 1000 [USP'U]/ML
INJECTION, SOLUTION INTRAVENOUS; SUBCUTANEOUS
Status: DISPENSED
Start: 2019-05-02

## (undated) RX ORDER — ONDANSETRON 2 MG/ML
INJECTION INTRAMUSCULAR; INTRAVENOUS
Status: DISPENSED
Start: 2019-11-22

## (undated) RX ORDER — NICARDIPINE HYDROCHLORIDE 2.5 MG/ML
INJECTION INTRAVENOUS
Status: DISPENSED
Start: 2019-05-23

## (undated) RX ORDER — REGADENOSON 0.08 MG/ML
INJECTION, SOLUTION INTRAVENOUS
Status: DISPENSED
Start: 2019-12-11

## (undated) RX ORDER — HEPARIN SODIUM 1000 [USP'U]/ML
INJECTION, SOLUTION INTRAVENOUS; SUBCUTANEOUS
Status: DISPENSED
Start: 2019-05-23

## (undated) RX ORDER — DIPHENHYDRAMINE HYDROCHLORIDE 50 MG/ML
INJECTION INTRAMUSCULAR; INTRAVENOUS
Status: DISPENSED
Start: 2019-05-23

## (undated) RX ORDER — BUPIVACAINE HYDROCHLORIDE 2.5 MG/ML
INJECTION, SOLUTION EPIDURAL; INFILTRATION; INTRACAUDAL
Status: DISPENSED
Start: 2019-11-22

## (undated) RX ORDER — CALCIUM CHLORIDE 100 MG/ML
INJECTION INTRAVENOUS; INTRAVENTRICULAR
Status: DISPENSED
Start: 2019-11-22

## (undated) RX ORDER — NITROGLYCERIN 5 MG/ML
VIAL (ML) INTRAVENOUS
Status: DISPENSED
Start: 2019-05-23

## (undated) RX ORDER — CEFAZOLIN SODIUM 2 G/100ML
INJECTION, SOLUTION INTRAVENOUS
Status: DISPENSED
Start: 2019-05-02

## (undated) RX ORDER — PROPOFOL 10 MG/ML
INJECTION, EMULSION INTRAVENOUS
Status: DISPENSED
Start: 2019-11-22

## (undated) RX ORDER — METOPROLOL TARTRATE 1 MG/ML
INJECTION, SOLUTION INTRAVENOUS
Status: DISPENSED
Start: 2018-03-02

## (undated) RX ORDER — HEPARIN SODIUM 1000 [USP'U]/ML
INJECTION, SOLUTION INTRAVENOUS; SUBCUTANEOUS
Status: DISPENSED
Start: 2019-11-22

## (undated) RX ORDER — AMINOPHYLLINE 25 MG/ML
INJECTION, SOLUTION INTRAVENOUS
Status: DISPENSED
Start: 2019-12-11

## (undated) RX ORDER — ALBUTEROL SULFATE 0.83 MG/ML
SOLUTION RESPIRATORY (INHALATION)
Status: DISPENSED
Start: 2019-11-22